# Patient Record
Sex: FEMALE | Race: WHITE | NOT HISPANIC OR LATINO | Employment: OTHER | URBAN - METROPOLITAN AREA
[De-identification: names, ages, dates, MRNs, and addresses within clinical notes are randomized per-mention and may not be internally consistent; named-entity substitution may affect disease eponyms.]

---

## 2017-02-14 ENCOUNTER — OFFICE VISIT (OUTPATIENT)
Dept: FAMILY MEDICINE | Facility: CLINIC | Age: 77
End: 2017-02-14
Payer: COMMERCIAL

## 2017-02-14 VITALS
SYSTOLIC BLOOD PRESSURE: 110 MMHG | HEIGHT: 65 IN | HEART RATE: 80 BPM | DIASTOLIC BLOOD PRESSURE: 68 MMHG | BODY MASS INDEX: 33.99 KG/M2 | WEIGHT: 204 LBS | TEMPERATURE: 97.6 F

## 2017-02-14 DIAGNOSIS — G47.00 INSOMNIA, UNSPECIFIED: ICD-10-CM

## 2017-02-14 DIAGNOSIS — I10 ESSENTIAL HYPERTENSION WITH GOAL BLOOD PRESSURE LESS THAN 140/90: ICD-10-CM

## 2017-02-14 DIAGNOSIS — E87.6 HYPOKALEMIA: ICD-10-CM

## 2017-02-14 DIAGNOSIS — K21.9 GASTROESOPHAGEAL REFLUX DISEASE, ESOPHAGITIS PRESENCE NOT SPECIFIED: ICD-10-CM

## 2017-02-14 DIAGNOSIS — H61.23 BILATERAL IMPACTED CERUMEN: ICD-10-CM

## 2017-02-14 DIAGNOSIS — E11.9 TYPE 2 DIABETES MELLITUS WITHOUT COMPLICATION, WITHOUT LONG-TERM CURRENT USE OF INSULIN (H): Primary | ICD-10-CM

## 2017-02-14 DIAGNOSIS — E78.5 HYPERLIPIDEMIA WITH TARGET LDL LESS THAN 100: ICD-10-CM

## 2017-02-14 PROBLEM — E03.9 HYPOTHYROIDISM, UNSPECIFIED TYPE: Status: ACTIVE | Noted: 2017-02-14

## 2017-02-14 LAB — HBA1C MFR BLD: 6.1 % (ref 4.3–6)

## 2017-02-14 PROCEDURE — 99213 OFFICE O/P EST LOW 20 MIN: CPT | Performed by: NURSE PRACTITIONER

## 2017-02-14 PROCEDURE — 83036 HEMOGLOBIN GLYCOSYLATED A1C: CPT | Performed by: NURSE PRACTITIONER

## 2017-02-14 PROCEDURE — 36415 COLL VENOUS BLD VENIPUNCTURE: CPT | Performed by: NURSE PRACTITIONER

## 2017-02-14 RX ORDER — PRAVASTATIN SODIUM 40 MG
40 TABLET ORAL DAILY
Qty: 90 TABLET | Refills: 1 | Status: SHIPPED | OUTPATIENT
Start: 2017-02-14 | End: 2017-08-23

## 2017-02-14 RX ORDER — PANTOPRAZOLE SODIUM 40 MG/1
40 TABLET, DELAYED RELEASE ORAL DAILY
Qty: 90 TABLET | Refills: 1 | Status: ON HOLD | OUTPATIENT
Start: 2017-02-14 | End: 2017-09-05

## 2017-02-14 RX ORDER — POTASSIUM CHLORIDE 750 MG/1
10 TABLET, EXTENDED RELEASE ORAL DAILY
Qty: 90 TABLET | Refills: 1 | Status: SHIPPED | OUTPATIENT
Start: 2017-02-14 | End: 2017-08-23

## 2017-02-14 NOTE — PROGRESS NOTES
SUBJECTIVE:                                                    Mya Hui is a 76 year old female who presents to clinic today for the following health issues:        Diabetes Follow-up      Patient is checking blood sugars: rarely.  Results range from 120-140     Diabetic concerns: None     Symptoms of hypoglycemia (low blood sugar): none     Paresthesias (numbness or burning in feet) or sores: No     Date of last diabetic eye exam: 12/2016     Hyperlipidemia Follow-Up      Rate your low fat/cholesterol diet?: good    Taking statin?  Yes, no muscle aches from statin    Other lipid medications/supplements?:  none     Hypertension Follow-up      Outpatient blood pressures are being checked at home.  Results are normal, patient does not check very often.    Low Salt Diet: no added salt. Patient does get pre packaged foods at times, unable to monitor salf intake sometimes         Amount of exercise or physical activity: walking    Problems taking medications regularly: No    Medication side effects: none  Diet: regular (no restrictions)    Patient is here to follow-up the above conditions. She is taking her medication as ordered, denies adverse side effects. Her blood pressure has been running within goal. Diabetes also is well controlled. She recently ran out of test strips and lancets, so is needing a refill.  She reports falling on [place], injuring the right side of her ribs. She is improved significantly, but still gets a little twinge once in a while when turning over in bed. She has no problems breathing, denies sustaining any other injury at the time  Her ears are feeling plugged, not painful. She does have some diminished hearing, would like to have them checked today    Problem list and histories reviewed & adjusted, as indicated.  Additional history: as documented    BP Readings from Last 3 Encounters:   02/14/17 110/68   11/10/16 110/70   09/07/16 126/62    Wt Readings from Last 3 Encounters:  "  02/14/17 204 lb (92.5 kg)   11/10/16 200 lb (90.7 kg)   09/07/16 198 lb (89.8 kg)                  Problem list, Medication list, Allergies, and Medical/Social/Surgical histories reviewed in [facility] and updated as appropriate.    ROS:  Constitutional, HEENT, cardiovascular, pulmonary, gi and gu systems are negative, except as otherwise noted.    OBJECTIVE:                                                    /68  Pulse 80  Temp 97.6  F (36.4  C) (Tympanic)  Ht 5' 5\" (1.651 m)  Wt 204 lb (92.5 kg)  BMI 33.95 kg/m2  Body mass index is 33.95 kg/(m^2).  GENERAL: healthy, alert and no distress  EYES: Eyes grossly normal to inspection, PERRL and conjunctivae and sclerae normal  HENT: Both ear canals are occluded by soft cerumen. Following irrigation, they are clear, TMs are pearly gray  NECK: no adenopathy, no asymmetry, masses, or scars and thyroid normal to palpation  RESP: lungs clear to auscultation - no rales, rhonchi or wheezes  CV: regular rate and rhythm, normal S1 S2, no S3 or S4, no murmur, click or rub, no peripheral edema and peripheral pulses strong  ABDOMEN: soft, nontender, no hepatosplenomegaly, no masses and bowel sounds normal  MS: no gross musculoskeletal defects noted, no edema  NEURO: Normal strength and tone, mentation intact and speech normal  PSYCH: mentation appears normal, affect normal/bright         ASSESSMENT/PLAN:                                                      Problem List Items Addressed This Visit        Medium    Insomnia, unspecified    Relevant Medications    amitriptyline (ELAVIL) 25 MG tablet    Hyperlipidemia with target LDL less than 100    Relevant Medications    pravastatin (PRAVACHOL) 40 MG tablet    GERD (gastroesophageal reflux disease)    Relevant Medications    pantoprazole (PROTONIX) 40 MG EC tablet    Essential hypertension with goal blood pressure less than 140/90      Other Visit Diagnoses     Type 2 diabetes mellitus without complication, without long-term " current use of insulin (H)    -  Primary    Relevant Orders    Hemoglobin A1c (Completed)    Hypokalemia        Relevant Medications    potassium chloride SA (K-DUR/KLOR-CON M) 10 MEQ CR tablet    Bilateral impacted cerumen             Hemoglobin A1c has been ordered, patient will be notified of result  She requested the above noted medications to be refilled, condition stable, not addressed today    There is no discomfort to palpation of the ribs. Suspect this is healing soft tissue injury from her fall. No further follow-up required for this issue      Follow-up in clinic in 6 weeks at which time she'll be due for another hemoglobin A1c and fasting blood work      MAYI Anthony CNP  [facility]

## 2017-02-14 NOTE — LETTER
[address] [id number] [phone]             February 15, 2017    Mya Hui  [address] [place] [address]              Dear Mya,     hemoglobin A1c is excellent at 6.1.  Enclosed is a copy of the results.  It was a pleasure to see you at your last appointment.    If you have any questions or concerns, please call myself or my nurse at [phone].      Sincerely,      Chanell Nelson CNP / care team

## 2017-02-14 NOTE — NURSING NOTE
"Chief Complaint   Patient presents with     Recheck Medication       Initial There were no vitals taken for this visit. Estimated body mass index is 32.53 kg/(m^2) as calculated from the following:    Height as of 8/16/16: 5' 5.75\" (1.67 m).    Weight as of 11/10/16: 200 lb (90.7 kg).  Medication Reconciliation: complete  "

## 2017-02-14 NOTE — MR AVS SNAPSHOT
"              After Visit Summary   2/14/2017    Mya Hui    MRN: [id number]           Patient Information     Date Of Birth          1940        Visit Information        Provider Department      2/14/2017 10:00 AM Chanell Nelson APRN CNP [facility]        Today's Diagnoses     Type 2 diabetes mellitus without complication, without long-term current use of insulin (H)    -  1    Insomnia, unspecified        Hyperlipidemia with target LDL less than 100        Hypokalemia        Gastroesophageal reflux disease, esophagitis presence not specified           Follow-ups after your visit        Who to contact     If you have questions or need follow up information about today's clinic visit or your schedule please contact [facility] directly at [phone].  Normal or non-critical lab and imaging results will be communicated to you by Dajiabaohart, letter or phone within 4 business days after the clinic has received the results. If you do not hear from us within 7 days, please contact the clinic through Dajiabaohart or phone. If you have a critical or abnormal lab result, we will notify you by phone as soon as possible.  Submit refill requests through [employer] or call your pharmacy and they will forward the refill request to us. Please allow 3 business days for your refill to be completed.          Additional Information About Your Visit        MyChart Information     [employer] lets you send messages to your doctor, view your test results, renew your prescriptions, schedule appointments and more. To sign up, go to [email or web address] . Click on \"Log in\" on the left side of the screen, which will take you to the Welcome page. Then click on \"Sign up Now\" on the right side of the page.     You will be asked to enter the access code listed below, as well as some personal information. Please follow the directions to create your username and password.     Your access code is: " [email or web address]  Expires: 5/15/2017 11:13 AM     Your access code will  in 90 days. If you need help or a new code, please call your [facility] clinic or [phone].        Care EveryWhere ID     This is your Care EveryWhere ID. This could be used by other organizations to access your [facility] medical records  [id number]        Your Vitals Were     Pulse Temperature Height BMI (Body Mass Index)          80 97.6  F (36.4  C) (Tympanic) 5' 5\" (1.651 m) 33.95 kg/m2         Blood Pressure from Last 3 Encounters:   17 110/68   11/10/16 110/70   16 126/62    Weight from Last 3 Encounters:   17 204 lb (92.5 kg)   11/10/16 200 lb (90.7 kg)   16 198 lb (89.8 kg)              We Performed the Following     Hemoglobin A1c          Where to get your medicines      These medications were sent to [id number] - [place], [place] - [address]  [address] [address]    Hours:  Formerly [facility] teresaers - numbers unchanged   03  Phone:  [phone]     amitriptyline 25 MG tablet    pantoprazole 40 MG EC tablet    potassium chloride SA 10 MEQ CR tablet    pravastatin 40 MG tablet          Primary Care Provider Office Phone # Fax #    Chanell MAYI Spence [facility] [phone]       [facility] [address] [place] [address]        Thank you!     Thank you for choosing [facility]  for your care. Our goal is always to provide you with excellent care. Hearing back from our patients is one way we can continue to improve our services. Please take a few minutes to complete the written survey that you may receive in the mail after your visit with us. Thank you!             Your Updated Medication List - Protect others around you: Learn how to safely use, store and throw away your medicines at www.disposemymeds.org.          This list is accurate as of: 17 11:13 AM.  Always use your most recent med list.                   Brand Name Dispense " Instructions for use    amitriptyline 25 MG tablet    ELAVIL    90 tablet    Take 1 tablet (25 mg) by mouth At Bedtime       aspirin 81 MG chewable tablet      Take 81 mg by mouth daily.       diltiazem 120 MG 24 hr capsule     90 capsule    Take 1 capsule (120 mg) by mouth daily       levothyroxine 50 MCG tablet    SYNTHROID/LEVOTHROID    90 tablet    Take 1 tablet (50 mcg) by mouth daily       lisinopril-hydrochlorothiazide 20-12.5 MG per tablet    PRINZIDE/ZESTORETIC    270 tablet    TAKE TWO TABLETS BY MOUTH EVERY MORNING & 1 TABLET AT SUPPER       metFORMIN 500 MG 24 hr tablet    GLUCOPHAGE-XR    270 tablet    TAKE ONE TABLET BY MOUTH EVERY MORNING AND TAKE TWO TABLETS BY MOUTH EVERY EVENING WITH MEALS       MULTIVITAL PO      Take  by mouth. daily       oxybutynin 5 MG tablet    DITROPAN    90 tablet    Take 1 tablet (5 mg) by mouth At Bedtime       pantoprazole 40 MG EC tablet    PROTONIX    90 tablet    Take 1 tablet (40 mg) by mouth daily       potassium chloride SA 10 MEQ CR tablet    K-DUR/KLOR-CON M    90 tablet    Take 1 tablet (10 mEq) by mouth daily       pravastatin 40 MG tablet    PRAVACHOL    90 tablet    Take 1 tablet (40 mg) by mouth daily

## 2017-02-15 ENCOUNTER — TELEPHONE (OUTPATIENT)
Dept: FAMILY MEDICINE | Facility: CLINIC | Age: 77
End: 2017-02-15

## 2017-02-15 DIAGNOSIS — E11.9 TYPE 2 DIABETES MELLITUS WITHOUT COMPLICATION, WITHOUT LONG-TERM CURRENT USE OF INSULIN (H): Primary | ICD-10-CM

## 2017-02-15 NOTE — TELEPHONE ENCOUNTER
Reason for Call:  Other prescription    Detailed comments: [facility] in [place] called and stated that they received a fax and was very confused. Please call them back.     Phone Number Patient can be reached at: Other phone number:  [phone]    Best Time: any    Can we leave a detailed message on this number? YES    Call taken on 2/15/2017 at 5:20 PM by Cleopatra Barbosa

## 2017-02-16 PROBLEM — E11.9 TYPE 2 DIABETES MELLITUS WITHOUT COMPLICATION, WITHOUT LONG-TERM CURRENT USE OF INSULIN (H): Status: ACTIVE | Noted: 2017-02-16

## 2017-02-16 NOTE — TELEPHONE ENCOUNTER
Spoke with patient, she states test strip she has is Soft Click Plus. Will review and send to provider for refill. ACase/MA

## 2017-02-16 NOTE — TELEPHONE ENCOUNTER
Per pharmacy, the test strips that are needed for patient are Accucheck. Unsure of specific strip name. LM on id vm for patient to call back. Please get full name if possible on the meter. Needing the information that is apparently on the meter. ACase/MA

## 2017-03-30 ENCOUNTER — OFFICE VISIT (OUTPATIENT)
Dept: FAMILY MEDICINE | Facility: CLINIC | Age: 77
End: 2017-03-30
Payer: COMMERCIAL

## 2017-03-30 VITALS
HEART RATE: 80 BPM | DIASTOLIC BLOOD PRESSURE: 80 MMHG | WEIGHT: 200 LBS | SYSTOLIC BLOOD PRESSURE: 132 MMHG | BODY MASS INDEX: 33.28 KG/M2 | TEMPERATURE: 97.2 F

## 2017-03-30 DIAGNOSIS — H26.9 CATARACT, BILATERAL: ICD-10-CM

## 2017-03-30 DIAGNOSIS — Z01.818 PREOP GENERAL PHYSICAL EXAM: Primary | ICD-10-CM

## 2017-03-30 PROCEDURE — 99214 OFFICE O/P EST MOD 30 MIN: CPT | Performed by: NURSE PRACTITIONER

## 2017-03-30 RX ORDER — LEVOTHYROXINE SODIUM 50 UG/1
50 TABLET ORAL DAILY
Qty: 90 TABLET | Refills: 2 | Status: CANCELLED | OUTPATIENT
Start: 2017-03-30

## 2017-03-30 NOTE — PATIENT INSTRUCTIONS
Before Your Surgery      Call your surgeon if there is any change in your health. This includes signs of a cold or flu (such as a sore throat, runny nose, cough, rash or fever).    Do not smoke, drink alcohol or take over the counter medicine (unless your surgeon or primary care doctor tells you to) for the 24 hours before and after surgery.    If you take prescribed drugs: Follow your doctor s orders about which medicines to take and which to stop until after surgery.    Eating and drinking prior to surgery: follow the instructions from your surgeon    Take a shower or bath the night before surgery. Use the soap your surgeon gave you to gently clean your skin. If you do not have soap from your surgeon, use your regular soap. Do not shave or scrub the surgery site.  Wear clean pajamas and have clean sheets on your bed.   Before Your Surgery    Call your surgeon if there is any change in your health. This includes signs of a cold or flu (such as a sore throat, runny nose, cough, rash or fever).  Do not smoke, drink alcohol or take over the counter medicine (unless your surgeon or primary care doctor tells you to) for the 24 hours before and after surgery.  If you take prescribed drugs: Follow your doctor s orders about which medicines to take and which to stop until after surgery.  Eating and drinking prior to surgery: follow the instructions from your surgeon  Take a shower or bath the night before surgery. Use the soap your surgeon gave you to gently clean your skin. If you do not have soap from your surgeon, use your regular soap. Do not shave or scrub the surgery site.  Wear clean pajamas and have clean sheets on your bed.

## 2017-03-30 NOTE — PROGRESS NOTES
[address] [address]  [phone]  Dept: [phone]    PRE-OP EVALUATION:  Today's date: 3/30/2017    Mya Hui (: 1940) presents for pre-operative evaluation assessment as requested by Dr. Cesar.  She requires evaluation and anesthesia risk assessment prior to undergoing surgery/procedure for treatment of eye cataracts .  Proposed procedure: Cataract extraction right eye      left eye     Date of Surgery/ Procedure: 17  Time of Surgery/ Procedure: 882, 830  Hospital/Surgical Facility: [facility]  Fax number for surgical facility: [phone]  Primary Physician: Chanell Nelson  Type of Anesthesia Anticipated: Local with MAC    Patient has a Health Care Directive or Living Will:  YES     1. NO - Do you have a history of heart attack, stroke, stent, bypass or surgery on an artery in the head, neck, heart or legs?  2. NO - Do you ever have any pain or discomfort in your chest?  3. NO - Do you have a history of  Heart Failure?  4. NO - Are you troubled by shortness of breath when: walking on the level, up a slight hill or at night?  5. NO - Do you currently have a cold, bronchitis or other respiratory infection?  6. NO - Do you have a cough, shortness of breath or wheezing?  7. NO - Do you sometimes get pains in the calves of your legs when you walk?  8. yes - Do you or anyone in your family have previous history of blood clots? daughter  9. yes - Do you or does anyone in your family have a serious bleeding problem such as prolonged bleeding following surgeries or cuts? daughter  10. NO - Have you ever had problems with anemia or been told to take iron pills?  11. NO - Have you had any abnormal blood loss such as black, tarry or bloody stools, or abnormal vaginal bleeding?  12. NO - Have you ever had a blood transfusion?  13. YES - Have you or any of your relatives ever had problems with anesthesia? Severe nausea  with narcotic meds; note allergy to fentanyl  14. NO - Do you have sleep apnea, excessive snoring or daytime drowsiness?  15. NO - Do you have any prosthetic heart valves?  16. NO - Do you have prosthetic joints?  17. NO - Is there any chance that you may be pregnant?      HPI:                                                      Brief HPI related to upcoming procedure: History of bilateral cataracts, recently worsening      See problem list for active medical problems.  Problems all longstanding and stable, except as noted/documented.  See ROS for pertinent symptoms related to these conditions.                                                                                                  .    MEDICAL HISTORY:                                                      Patient Active Problem List    Diagnosis Date Noted     Cataract, bilateral 03/30/2017     Priority: Medium     Type 2 diabetes mellitus without complication, without long-term current use of insulin (H) 02/16/2017     Priority: Medium     Hypothyroidism, unspecified type 02/14/2017     Priority: Medium     Insomnia, unspecified 02/14/2017     Priority: Medium     OAB (overactive bladder) 09/07/2016     Priority: Medium     Essential hypertension with goal blood pressure less than 140/90 09/07/2016     Priority: Medium     Osteoarthritis of hip 08/21/2014     Priority: Medium     Do you wish to do the replacement in the background? yes         DDD (degenerative disc disease), lumbar 08/21/2014     Priority: Medium     Hearing aid worn 05/30/2014     Priority: Medium     Right ear       Hip pain 09/27/2013     Priority: Medium     Sciatica of right side 06/28/2013     Priority: Medium     Asymptomatic carotid artery stenosis 05/02/2013     Priority: Medium     Hyperlipidemia with target LDL less than 100      Priority: Medium     Diagnosis updated by automated process. Provider to review and confirm.       GERD (gastroesophageal reflux disease)       Priority: Medium     History of renal calculi      Priority: Medium     Nevus 04/10/2013     Priority: Medium     Do you wish to do the replacement in the background? yes         Advance Care Planning 04/04/2013     Priority: Medium     Advance Care Planning:   Receipt of ACP document:  Received: Health Care Directive which was witnessed or notarized on 3/18/10.  Document not previously scanned.  Validation form completed and sent with document to be scanned.  Code Status reflects choices in most recent ACP document.  Confirmed/documented designated decision maker(s). See permanent comments section of demographics in clinical tab. View document(s) and details by clicking on code status.   Added by Jolie Wagner on 4/23/2014.                   Arthritis      Priority: Medium      Past Medical History:   Diagnosis Date     Arthritis      Diabetes type 2, controlled (H)      GERD (gastroesophageal reflux disease)      History of renal calculi      HTN, goal below 140/90      Hyperlipidaemia LDL goal < 100      Hypothyroid      Insomnia      Left carotid stenosis      Sciatica of right side 6/28/2013     Past Surgical History:   Procedure Laterality Date     BUNIONECTOMY      Right foot     COLONOSCOPY  7/12/2013    Procedure: COLONOSCOPY;  Colonoscopy;  Surgeon: Giovany Espinoza MD;  Location: [facility]     FRACTURE TX, ANKLE RT/LT      Left ankle     HC CYSTOURETHROSCOPY W/ URETEROSCOPY &/OR PYELOSCOPY; W/ LITHOTRIPSY       HC REVISE MEDIAN N/CARPAL TUNNEL SURG      Right wrist     INJECT JOINT SACROILIAC  4/10/2014    Procedure: INJECT JOINT SACROILIAC;  Sacroiliac Joint Injection;  Surgeon: Gilbert Mcclure MD;  Location: [facility] OR     Current Outpatient Prescriptions   Medication Sig Dispense Refill     blood glucose (NO BRAND SPECIFIED) lancets standard Glucose test strips  Use to test blood sugar 1 times daily or as directed. 1 Box 11     blood glucose monitoring (NO BRAND SPECIFIED) test strip Use to test blood  sugars 1 times daily or as directed 100 strip 3     amitriptyline (ELAVIL) 25 MG tablet Take 1 tablet (25 mg) by mouth At Bedtime 90 tablet 2     pravastatin (PRAVACHOL) 40 MG tablet Take 1 tablet (40 mg) by mouth daily 90 tablet 1     potassium chloride SA (K-DUR/KLOR-CON M) 10 MEQ CR tablet Take 1 tablet (10 mEq) by mouth daily 90 tablet 1     pantoprazole (PROTONIX) 40 MG EC tablet Take 1 tablet (40 mg) by mouth daily 90 tablet 1     oxybutynin (DITROPAN) 5 MG tablet Take 1 tablet (5 mg) by mouth At Bedtime 90 tablet 3     metFORMIN (GLUCOPHAGE-XR) 500 MG 24 hr tablet TAKE ONE TABLET BY MOUTH EVERY MORNING AND TAKE TWO TABLETS BY MOUTH EVERY EVENING WITH MEALS 270 tablet 2     lisinopril-hydrochlorothiazide (PRINZIDE,ZESTORETIC) 20-12.5 MG per tablet TAKE TWO TABLETS BY MOUTH EVERY MORNING & 1 TABLET AT SUPPER 270 tablet 2     diltiazem 120 MG 24 hr CD capsule Take 1 capsule (120 mg) by mouth daily 90 capsule 2     levothyroxine (SYNTHROID, LEVOTHROID) 50 MCG tablet Take 1 tablet (50 mcg) by mouth daily 90 tablet 2     Multiple Vitamins-Minerals (MULTIVITAL PO) Take  by mouth. daily       aspirin 81 MG chewable tablet Take 81 mg by mouth daily.       OTC products: None, except as noted above    Allergies   Allergen Reactions     Codeine Nausea     Fentanyl Nausea and Vomiting     VERY sensitive to most narcotics if not all.      Latex Allergy: NO    Social History   Substance Use Topics     Smoking status: Never Smoker     Smokeless tobacco: Never Used     Alcohol use Yes     History   Drug Use No       REVIEW OF SYSTEMS:                                                    C: NEGATIVE for fever, chills, change in weight  E/M: NEGATIVE for ear, mouth and throat problems  R: NEGATIVE for significant cough or SOB  CV: NEGATIVE for chest pain, palpitations or peripheral edema    EXAM:                                                    /80  Pulse 80  Temp 97.2  F (36.2  C) (Tympanic)  Wt 200 lb (90.7 kg)  BMI  33.28 kg/m2  GENERAL APPEARANCE: healthy, alert and no distress  HENT: ear canals and TM's normal and nose and mouth without ulcers or lesions  RESP: lungs clear to auscultation - no rales, rhonchi or wheezes  CV: regular rate and rhythm, normal S1 S2, no S3 or S4 and no murmur, click or rub   ABDOMEN: soft, nontender, no HSM or masses and bowel sounds normal  NEURO: Normal strength and tone, sensory exam grossly normal, mentation intact and speech normal    DIAGNOSTICS:                                                    No labs or EKG required for low risk surgery (cataract, skin procedure, breast biopsy, etc)    Recent Labs   Lab Test  02/14/17   1125  09/13/16   1002  08/16/16   1443  06/25/15   1016  05/22/15   1142   06/07/11   2220   HGB   --    --    --    --    --    --   13.1   PLT   --    --    --    --    --    --   212   NA   --    --   143   --   142   < >  145*   POTASSIUM   --   3.5  2.9*   --   3.0*   < >  3.5   CR   --    --   0.89  0.86  1.17*   < >  0.87   A1C  6.1*   --   6.2*   --   6.6*   < >   --     < > = values in this interval not displayed.        IMPRESSION:                                                    Reason for surgery/procedure: Cataract extraction right eye 4/5     left eye 4/19    Diagnosis/reason for consult: No medical contraindication noted to proceeding as planned     The proposed surgical procedure is considered LOW risk.    REVISED CARDIAC RISK INDEX  The patient has the following serious cardiovascular risks for perioperative complications such as (MI, PE, VFib and 3  AV Block):  No serious cardiac risks  INTERPRETATION: 0 risks: Class I (very low risk - 0.4% complication rate)    The patient has the following additional risks for perioperative complications:  No identified additional risks      ICD-10-CM    1. Preop general physical exam Z01.818    2. Cataract, bilateral H26.9        RECOMMENDATIONS:                                                        APPROVAL GIVEN  to proceed with proposed procedure, without further diagnostic evaluation       Signed Electronically by: MAYI Anthony CNP    Copy of this evaluation report is provided to requesting physician.    [facility] Preop Guidelines

## 2017-05-08 ENCOUNTER — TRANSFERRED RECORDS (OUTPATIENT)
Dept: HEALTH INFORMATION MANAGEMENT | Facility: CLINIC | Age: 77
End: 2017-05-08

## 2017-05-31 ENCOUNTER — OFFICE VISIT (OUTPATIENT)
Dept: FAMILY MEDICINE | Facility: CLINIC | Age: 77
End: 2017-05-31
Payer: COMMERCIAL

## 2017-05-31 VITALS
DIASTOLIC BLOOD PRESSURE: 56 MMHG | HEIGHT: 65 IN | BODY MASS INDEX: 32.15 KG/M2 | WEIGHT: 193 LBS | SYSTOLIC BLOOD PRESSURE: 114 MMHG | OXYGEN SATURATION: 100 % | TEMPERATURE: 97.3 F | HEART RATE: 94 BPM

## 2017-05-31 DIAGNOSIS — N32.81 OAB (OVERACTIVE BLADDER): ICD-10-CM

## 2017-05-31 DIAGNOSIS — R30.0 DYSURIA: Primary | ICD-10-CM

## 2017-05-31 DIAGNOSIS — N30.01 ACUTE CYSTITIS WITH HEMATURIA: ICD-10-CM

## 2017-05-31 LAB
ALBUMIN UR-MCNC: 100 MG/DL
AMORPH CRY #/AREA URNS HPF: ABNORMAL /HPF
APPEARANCE UR: ABNORMAL
BACTERIA #/AREA URNS HPF: ABNORMAL /HPF
BILIRUB UR QL STRIP: ABNORMAL
COLOR UR AUTO: YELLOW
GLUCOSE UR STRIP-MCNC: NEGATIVE MG/DL
HGB UR QL STRIP: ABNORMAL
HYALINE CASTS #/AREA URNS LPF: 48 /LPF (ref 0–2)
KETONES UR STRIP-MCNC: 5 MG/DL
LEUKOCYTE ESTERASE UR QL STRIP: ABNORMAL
NITRATE UR QL: NEGATIVE
PH UR STRIP: 5 PH (ref 5–7)
RBC #/AREA URNS AUTO: >182 /HPF (ref 0–2)
SP GR UR STRIP: 1.03 (ref 1–1.03)
SQUAMOUS #/AREA URNS AUTO: 6 /HPF (ref 0–1)
URN SPEC COLLECT METH UR: ABNORMAL
UROBILINOGEN UR STRIP-MCNC: 2 MG/DL (ref 0–2)
WBC #/AREA URNS AUTO: 2965 /HPF (ref 0–2)
WBC CLUMPS #/AREA URNS HPF: PRESENT /HPF

## 2017-05-31 PROCEDURE — 81001 URINALYSIS AUTO W/SCOPE: CPT | Performed by: FAMILY MEDICINE

## 2017-05-31 PROCEDURE — 99213 OFFICE O/P EST LOW 20 MIN: CPT | Performed by: FAMILY MEDICINE

## 2017-05-31 PROCEDURE — 87086 URINE CULTURE/COLONY COUNT: CPT | Performed by: FAMILY MEDICINE

## 2017-05-31 PROCEDURE — 87088 URINE BACTERIA CULTURE: CPT | Performed by: FAMILY MEDICINE

## 2017-05-31 PROCEDURE — 87186 SC STD MICRODIL/AGAR DIL: CPT | Performed by: FAMILY MEDICINE

## 2017-05-31 RX ORDER — NITROFURANTOIN 25; 75 MG/1; MG/1
100 CAPSULE ORAL 2 TIMES DAILY
Qty: 14 CAPSULE | Refills: 0 | Status: SHIPPED | OUTPATIENT
Start: 2017-05-31 | End: 2017-06-16

## 2017-05-31 ASSESSMENT — PAIN SCALES - GENERAL: PAINLEVEL: NO PAIN (0)

## 2017-05-31 NOTE — PATIENT INSTRUCTIONS
You have a bladder infection called UTI or acute cystitis.     I am starting you on a bladder antibiotic called Macrobid or nitrofurantoin. You will take 1 pill twice daily for 1 week.  This should hopefully help your bladder infection clear up.     You also need to drink more water, ideally 4-6 cups per day or more if able.   This may help your bladder AND your dry mouth.     Please try to cut back on your coffee to no more than 1 or 2 cups per day, or switch to decaf if possible.     You may take your bladder pill up to 4 times daily, or every 6 hours, as needed for bladder leaking or urgency.   This can make your dry mouth worse, so only take it as needed, for example if you are going out of the house or having more symptoms.     If you do not feel better after completing your antibiotics, please let me know.     Daina Cm MD

## 2017-05-31 NOTE — MR AVS SNAPSHOT
After Visit Summary   5/31/2017    Mya Hui    MRN: [id number]           Patient Information     Date Of Birth          1940        Visit Information        Provider Department      5/31/2017 10:15 AM Daina Cm MD [facility]        Today's Diagnoses     Dysuria    -  1    OAB (overactive bladder)        Acute cystitis with hematuria          Care Instructions    You have a bladder infection called UTI or acute cystitis.     I am starting you on a bladder antibiotic called Macrobid or nitrofurantoin. You will take 1 pill twice daily for 1 week.  This should hopefully help your bladder infection clear up.     You also need to drink more water, ideally 4-6 cups per day or more if able.   This may help your bladder AND your dry mouth.     Please try to cut back on your coffee to no more than 1 or 2 cups per day, or switch to decaf if possible.     You may take your bladder pill up to 4 times daily, or every 6 hours, as needed for bladder leaking or urgency.   This can make your dry mouth worse, so only take it as needed, for example if you are going out of the house or having more symptoms.     If you do not feel better after completing your antibiotics, please let me know.     Daina Cm MD           Follow-ups after your visit        Future tests that were ordered for you today     Open Future Orders        Priority Expected Expires Ordered    *UA reflex to Microscopic and Culture ([place]; [facility]; [facility]; [facility]; [facility]; [facility]; [place]; [place]) Routine  5/31/2018 5/31/2017            Who to contact     If you have questions or need follow up information about today's clinic visit or your schedule please contact [facility] directly at [phone].  Normal or non-critical lab and imaging results will be communicated to you by MyChart, letter or phone within 4 business days after the  "clinic has received the results. If you do not hear from us within 7 days, please contact the clinic through RecordS[employer] or phone. If you have a critical or abnormal lab result, we will notify you by phone as soon as possible.  Submit refill requests through RecordS[employer] or call your pharmacy and they will forward the refill request to us. Please allow 3 business days for your refill to be completed.          Additional Information About Your Visit        joblocalharWikisway Information     RecordSled lets you send messages to your doctor, view your test results, renew your prescriptions, schedule appointments and more. To sign up, go to [email or web address] . Click on \"Log in\" on the left side of the screen, which will take you to the Welcome page. Then click on \"Sign up Now\" on the right side of the page.     You will be asked to enter the access code listed below, as well as some personal information. Please follow the directions to create your username and password.     Your access code is: [email or web address]  Expires: 2017 11:18 AM     Your access code will  in 90 days. If you need help or a new code, please call your [facility] clinic or [phone].        Care EveryWhere ID     This is your Care EveryWhere ID. This could be used by other organizations to access your [facility] medical records  [id number]        Your Vitals Were     Pulse Temperature Height Pulse Oximetry BMI (Body Mass Index)       94 97.3  F (36.3  C) (Temporal) 5' 5\" (1.651 m) 100% 32.12 kg/m2        Blood Pressure from Last 3 Encounters:   17 114/56   17 132/80   17 110/68    Weight from Last 3 Encounters:   17 193 lb (87.5 kg)   17 200 lb (90.7 kg)   17 204 lb (92.5 kg)              We Performed the Following     *UA reflex to Microscopic and Culture ([place]; [facility]; [facility]; [facility]; [facility]; [facility]; [place]; [place])     Urine Culture Aerobic Bacterial          Today's " Medication Changes          These changes are accurate as of: 5/31/17 11:18 AM.  If you have any questions, ask your nurse or doctor.               Start taking these medicines.        Dose/Directions    nitrofurantoin (macrocrystal-monohydrate) 100 MG capsule   Commonly known as:  MACROBID   Used for:  Acute cystitis with hematuria   Started by:  Daina Cm MD        Dose:  100 mg   Take 1 capsule (100 mg) by mouth 2 times daily   Quantity:  14 capsule   Refills:  0            Where to get your medicines      These medications were sent to [facility] [place] - [address]   [address] [place] [address]     Phone:  [phone]     nitrofurantoin (macrocrystal-monohydrate) 100 MG capsule                Primary Care Provider Office Phone # Fax #    Chanell MAYI Spence [facility] [phone]       [facility] [address]   [place] [address]        Thank you!     Thank you for choosing [facility]  for your care. Our goal is always to provide you with excellent care. Hearing back from our patients is one way we can continue to improve our services. Please take a few minutes to complete the written survey that you may receive in the mail after your visit with us. Thank you!             Your Updated Medication List - Protect others around you: Learn how to safely use, store and throw away your medicines at www.disposemymeds.org.          This list is accurate as of: 5/31/17 11:18 AM.  Always use your most recent med list.                   Brand Name Dispense Instructions for use    amitriptyline 25 MG tablet    ELAVIL    90 tablet    Take 1 tablet (25 mg) by mouth At Bedtime       aspirin 81 MG chewable tablet      Take 81 mg by mouth daily.       blood glucose lancets standard    no brand specified    1 Box    Glucose test strips Use to test blood sugar 1 times daily or as directed.       blood glucose monitoring test  strip    no brand specified    100 strip    Use to test blood sugars 1 times daily or as directed       diltiazem 120 MG 24 hr capsule     90 capsule    Take 1 capsule (120 mg) by mouth daily       levothyroxine 50 MCG tablet    SYNTHROID/LEVOTHROID    90 tablet    Take 1 tablet (50 mcg) by mouth daily       lisinopril-hydrochlorothiazide 20-12.5 MG per tablet    PRINZIDE/ZESTORETIC    270 tablet    TAKE TWO TABLETS BY MOUTH EVERY MORNING & 1 TABLET AT SUPPER       metFORMIN 500 MG 24 hr tablet    GLUCOPHAGE-XR    270 tablet    TAKE ONE TABLET BY MOUTH EVERY MORNING AND TAKE TWO TABLETS BY MOUTH EVERY EVENING WITH MEALS       MULTIVITAL PO      Take  by mouth. daily       nitrofurantoin (macrocrystal-monohydrate) 100 MG capsule    MACROBID    14 capsule    Take 1 capsule (100 mg) by mouth 2 times daily       oxybutynin 5 MG tablet    DITROPAN    90 tablet    Take 1 tablet (5 mg) by mouth At Bedtime       pantoprazole 40 MG EC tablet    PROTONIX    90 tablet    Take 1 tablet (40 mg) by mouth daily       potassium chloride SA 10 MEQ CR tablet    K-DUR/KLOR-CON M    90 tablet    Take 1 tablet (10 mEq) by mouth daily       pravastatin 40 MG tablet    PRAVACHOL    90 tablet    Take 1 tablet (40 mg) by mouth daily

## 2017-05-31 NOTE — PROGRESS NOTES
"  SUBJECTIVE:                                                    S:  Mya Hui is a 77 year old female with a history of overactive bladder and renal calculi, who presents to the clinic complaining of urinary incontinence, urgency, and frequency. Patient says that her symptoms began about 2 days ago. She is experiencing intermittent urinary leakage and urgency - \"it either doesn't come when she needs to urinate, or comes when she feels she doesn't need to\". Patient says that this is extremely bothersome. She states that she is taking Oxybutynin 5 mg qd, she took it last night. She does not find this helpful. She has not been drinking much water, but notes that she drinks 2-3 cups of coffee daily. Patient also has a history of Type II Diabetes and checks her blood sugar once monthly - she feels that this has been within normal limits. Patient denies any fever, dysuria, hematuria, CVA tenderness, confusion,       Problem list and histories reviewed & adjusted, as indicated.  Additional history: as documented    Patient Active Problem List   Diagnosis     Advance Care Planning     Arthritis     Nevus     Asymptomatic carotid artery stenosis     Hyperlipidemia with target LDL less than 100     GERD (gastroesophageal reflux disease)     History of renal calculi     Sciatica of right side     Hip pain     Hearing aid worn     Osteoarthritis of hip     DDD (degenerative disc disease), lumbar     OAB (overactive bladder)     Essential hypertension with goal blood pressure less than 140/90     Hypothyroidism, unspecified type     Insomnia, unspecified     Type 2 diabetes mellitus without complication, without long-term current use of insulin (H)     Cataract, bilateral     Past Surgical History:   Procedure Laterality Date     BUNIONECTOMY      Right foot     COLONOSCOPY  7/12/2013    Procedure: COLONOSCOPY;  Colonoscopy;  Surgeon: Giovany Espinoza MD;  Location: [facility]     FRACTURE TX, ANKLE RT/LT      Left " ankle     HC CYSTOURETHROSCOPY W/ URETEROSCOPY &/OR PYELOSCOPY; W/ LITHOTRIPSY       HC REVISE MEDIAN N/CARPAL TUNNEL SURG      Right wrist     INJECT JOINT SACROILIAC  4/10/2014    Procedure: INJECT JOINT SACROILIAC;  Sacroiliac Joint Injection;  Surgeon: Gilbert Mcclure MD;  Location: [facility] OR       Social History   Substance Use Topics     Smoking status: Never Smoker     Smokeless tobacco: Never Used     Alcohol use Yes     Family History   Problem Relation Age of Onset     Hypertension Brother      DIABETES Father      Cardiovascular Father      DIABETES Brother      Borderline     Breast Cancer Mother      DIABETES Brother      Blood Disease Brother      Cardiovascular Brother      MI         Current Outpatient Prescriptions   Medication Sig Dispense Refill     nitrofurantoin, macrocrystal-monohydrate, (MACROBID) 100 MG capsule Take 1 capsule (100 mg) by mouth 2 times daily 14 capsule 0     blood glucose (NO BRAND SPECIFIED) lancets standard Glucose test strips  Use to test blood sugar 1 times daily or as directed. 1 Box 11     blood glucose monitoring (NO BRAND SPECIFIED) test strip Use to test blood sugars 1 times daily or as directed 100 strip 3     amitriptyline (ELAVIL) 25 MG tablet Take 1 tablet (25 mg) by mouth At Bedtime 90 tablet 2     pravastatin (PRAVACHOL) 40 MG tablet Take 1 tablet (40 mg) by mouth daily 90 tablet 1     potassium chloride SA (K-DUR/KLOR-CON M) 10 MEQ CR tablet Take 1 tablet (10 mEq) by mouth daily 90 tablet 1     pantoprazole (PROTONIX) 40 MG EC tablet Take 1 tablet (40 mg) by mouth daily 90 tablet 1     oxybutynin (DITROPAN) 5 MG tablet Take 1 tablet (5 mg) by mouth At Bedtime 90 tablet 3     metFORMIN (GLUCOPHAGE-XR) 500 MG 24 hr tablet TAKE ONE TABLET BY MOUTH EVERY MORNING AND TAKE TWO TABLETS BY MOUTH EVERY EVENING WITH MEALS 270 tablet 2     lisinopril-hydrochlorothiazide (PRINZIDE,ZESTORETIC) 20-12.5 MG per tablet TAKE TWO TABLETS BY MOUTH EVERY MORNING & 1 TABLET AT SUPPER  "270 tablet 2     diltiazem 120 MG 24 hr CD capsule Take 1 capsule (120 mg) by mouth daily 90 capsule 2     levothyroxine (SYNTHROID, LEVOTHROID) 50 MCG tablet Take 1 tablet (50 mcg) by mouth daily 90 tablet 2     Multiple Vitamins-Minerals (MULTIVITAL PO) Take  by mouth. daily       aspirin 81 MG chewable tablet Take 81 mg by mouth daily.       Allergies   Allergen Reactions     Codeine Nausea     Fentanyl Nausea and Vomiting     VERY sensitive to most narcotics if not all.     Recent Labs   Lab Test  02/14/17   1125  09/13/16   1002  08/16/16   1443  06/25/15   1016  05/22/15   1142  05/16/14   0907   A1C  6.1*   --   6.2*   --   6.6*   --    LDL   --    --   61   --   68  99   HDL   --    --   39*   --   47*  41*   TRIG   --    --   235*   --   165*  120   ALT   --    --   26   --   25  35   CR   --    --   0.89  0.86  1.17*  0.77   GFRESTIMATED   --    --   62  65  45*  74   GFRESTBLACK   --    --   75  79  55*  89   POTASSIUM   --   3.5  2.9*   --   3.0*  3.4   TSH   --    --   2.30   --   2.68  3.31      BP Readings from Last 3 Encounters:   05/31/17 114/56   03/30/17 132/80   02/14/17 110/68    Wt Readings from Last 3 Encounters:   05/31/17 193 lb (87.5 kg)   03/30/17 200 lb (90.7 kg)   02/14/17 204 lb (92.5 kg)        Reviewed and updated as needed this visit by clinical staff  Tobacco  Allergies  Meds  Med Hx  Surg Hx  Fam Hx  Soc Hx      Reviewed and updated as needed this visit by Provider         ROS:  Ears: Positive for ear congestion. She has a hearing aid and feels that her ears is congested - she would like this checked today.  All other ROS reviewed and are negative or non-contributory except as stated in HPI and above.     OBJECTIVE:                                                    /56  Pulse 94  Temp 97.3  F (36.3  C) (Temporal)  Ht 5' 5\" (1.651 m)  Wt 193 lb (87.5 kg)  SpO2 100%  BMI 32.12 kg/m2  Body mass index is 32.12 kg/(m^2).   Vitals noted.  Patient alert, oriented, and " in no acute distress.  Ears:  Canals clear, TM's nl bilaterally.  No erythema or fluid.  CV:  RRR without murmur.  Respiratory:  Lungs clear to auscultation bilaterally.    Diagnostic Test Results:  Orders Placed This Encounter   Procedures     *UA reflex to Microscopic and Culture ([place]; [facility]; [facility]; [facility]; [facility]; [facility]; [place]; [place])     *UA reflex to Microscopic and Culture ([place]; [facility]; [facility]; [facility]; [facility]; [facility]; [place]; [place])        ASSESSMENT:                                                        ICD-10-CM    1. Dysuria R30.0 *UA reflex to Microscopic and Culture ([place]; [facility]; [facility]; [facility]; [facility]; [facility]; [place]; [place])     *UA reflex to Microscopic and Culture ([place]; [facility]; [facility]; [facility]; [facility]; [facility]; [place]; [place])     Urine Culture Aerobic Bacterial     CANCELED: *UA reflex to Microscopic and Culture ([facility] [place] and [place])     CANCELED: *UA reflex to Microscopic and Culture ([facility]except [place] and [place])   2. Acute cystitis with hematuria N30.01 nitrofurantoin, macrocrystal-monohydrate, (MACROBID) 100 MG capsule   3. OAB (overactive bladder) N32.81        PLAN:                                                      Patient is afebrile with normal vitals upon presentation to the clinic. Her exam is unremarkable. Attempted to collect UA but patient could not provide much urine. Encouraged her to drink some water here in the clinic and attempt to collect more of a sample. UA collected and pending. UA was abnormal and significant for blood, LE, bacteria, and WBC, as well as several other abnormalities. Culture pending - will notify if needed. I reviewed these results with the patient - these are consistent with a UTI. Prescribed Macrobid,  see orders. I encouraged her to work on staying hydrated as well. Discussed with the patient that she may want to consider decreasing her coffee intake, or switching to decaf, as caffeine is a bladder irritant and may be exacerbating her symptoms. Discussed that she can also raise her Oxybutynin dose prn. She should continue with her once daily dose, and take another dose in the morning or daytime if her bladder is bothersome. Patient is in agreement with this treatment plan. Follow up in the clinic if symptoms persist, or sooner with difficulty staying hydrated, high fever, significant abdominal pain, hematuria, or other worsening symptoms.     This document serves as a record of services personally performed by Daina Cm MD. It was created on their behalf by Luna Mayorga, a trained medical scribe. The creation of this record is based on the provider's personal observations and the statements of the patient. This document has been checked and approved by the attending provider.    Daina Cm MD  [facility]

## 2017-05-31 NOTE — NURSING NOTE
"Chief Complaint   Patient presents with     UTI     urinary incontinance and urinary frequency and urgency/ sometimes she will feel the urge to go but nothing happens. Pt is taking oxybutynin        Initial /56  Pulse 94  Temp 97.3  F (36.3  C) (Temporal)  Ht 5' 5\" (1.651 m)  Wt 193 lb (87.5 kg)  SpO2 100%  BMI 32.12 kg/m2 Estimated body mass index is 32.12 kg/(m^2) as calculated from the following:    Height as of this encounter: 5' 5\" (1.651 m).    Weight as of this encounter: 193 lb (87.5 kg).  Medication Reconciliation: complete   Sharyn Sanchez CMA    "

## 2017-06-01 LAB
BACTERIA SPEC CULT: ABNORMAL
MICRO REPORT STATUS: ABNORMAL
MICROORGANISM SPEC CULT: ABNORMAL
SPECIMEN SOURCE: ABNORMAL

## 2017-06-02 NOTE — PROGRESS NOTES
Notify Mya that her urine culture shows that the antibiotics she is on should work fine.  If she is not improving, she needs to leave another sample for culture after finishing antibiotics.   Daina Cm MD

## 2017-06-16 ENCOUNTER — OFFICE VISIT (OUTPATIENT)
Dept: FAMILY MEDICINE | Facility: CLINIC | Age: 77
End: 2017-06-16
Payer: COMMERCIAL

## 2017-06-16 VITALS
HEART RATE: 80 BPM | TEMPERATURE: 97.2 F | BODY MASS INDEX: 33.45 KG/M2 | SYSTOLIC BLOOD PRESSURE: 138 MMHG | WEIGHT: 201 LBS | DIASTOLIC BLOOD PRESSURE: 80 MMHG

## 2017-06-16 DIAGNOSIS — E03.9 HYPOTHYROIDISM, UNSPECIFIED TYPE: ICD-10-CM

## 2017-06-16 DIAGNOSIS — R30.0 DYSURIA: ICD-10-CM

## 2017-06-16 DIAGNOSIS — I10 ESSENTIAL HYPERTENSION WITH GOAL BLOOD PRESSURE LESS THAN 140/90: ICD-10-CM

## 2017-06-16 DIAGNOSIS — R30.0 DYSURIA: Primary | ICD-10-CM

## 2017-06-16 DIAGNOSIS — M15.0 PRIMARY OSTEOARTHRITIS INVOLVING MULTIPLE JOINTS: ICD-10-CM

## 2017-06-16 DIAGNOSIS — E11.9 TYPE 2 DIABETES MELLITUS WITHOUT COMPLICATION, WITHOUT LONG-TERM CURRENT USE OF INSULIN (H): ICD-10-CM

## 2017-06-16 DIAGNOSIS — N32.81 OAB (OVERACTIVE BLADDER): Primary | ICD-10-CM

## 2017-06-16 LAB
ALBUMIN UR-MCNC: NEGATIVE MG/DL
APPEARANCE UR: ABNORMAL
BACTERIA #/AREA URNS HPF: ABNORMAL /HPF
BILIRUB UR QL STRIP: NEGATIVE
COLOR UR AUTO: YELLOW
GLUCOSE UR STRIP-MCNC: NEGATIVE MG/DL
HGB UR QL STRIP: NEGATIVE
HYALINE CASTS #/AREA URNS LPF: 1 /LPF (ref 0–2)
KETONES UR STRIP-MCNC: 5 MG/DL
LEUKOCYTE ESTERASE UR QL STRIP: ABNORMAL
MUCOUS THREADS #/AREA URNS LPF: PRESENT /LPF
NITRATE UR QL: NEGATIVE
PH UR STRIP: 5 PH (ref 5–7)
RBC #/AREA URNS AUTO: 1 /HPF (ref 0–2)
SP GR UR STRIP: 1.03 (ref 1–1.03)
SQUAMOUS #/AREA URNS AUTO: 9 /HPF (ref 0–1)
TRANS CELLS #/AREA URNS HPF: 1 /HPF
URN SPEC COLLECT METH UR: ABNORMAL
UROBILINOGEN UR STRIP-MCNC: 0 MG/DL (ref 0–2)
WBC #/AREA URNS AUTO: 5 /HPF (ref 0–2)

## 2017-06-16 PROCEDURE — 99213 OFFICE O/P EST LOW 20 MIN: CPT | Performed by: NURSE PRACTITIONER

## 2017-06-16 PROCEDURE — 81001 URINALYSIS AUTO W/SCOPE: CPT | Performed by: NURSE PRACTITIONER

## 2017-06-16 RX ORDER — ASPIRIN 81 MG/1
81 TABLET, CHEWABLE ORAL DAILY
Qty: 100 TABLET | Refills: 1 | Status: SHIPPED | OUTPATIENT
Start: 2017-06-16 | End: 2017-09-16

## 2017-06-16 RX ORDER — METFORMIN HCL 500 MG
TABLET, EXTENDED RELEASE 24 HR ORAL
Qty: 270 TABLET | Refills: 2 | Status: ON HOLD | OUTPATIENT
Start: 2017-06-16 | End: 2017-09-05

## 2017-06-16 RX ORDER — OXYBUTYNIN CHLORIDE 5 MG/1
10 TABLET ORAL 2 TIMES DAILY PRN
Qty: 240 TABLET | Refills: 0 | Status: ON HOLD | OUTPATIENT
Start: 2017-06-16 | End: 2017-09-05

## 2017-06-16 RX ORDER — DILTIAZEM HYDROCHLORIDE 120 MG/1
120 CAPSULE, COATED, EXTENDED RELEASE ORAL DAILY
Qty: 90 CAPSULE | Refills: 2 | Status: SHIPPED | OUTPATIENT
Start: 2017-06-16 | End: 2018-03-08

## 2017-06-16 RX ORDER — LEVOTHYROXINE SODIUM 50 UG/1
50 TABLET ORAL DAILY
Qty: 90 TABLET | Refills: 2 | Status: SHIPPED | OUTPATIENT
Start: 2017-06-16 | End: 2018-04-26

## 2017-06-16 RX ORDER — LISINOPRIL AND HYDROCHLOROTHIAZIDE 12.5; 2 MG/1; MG/1
TABLET ORAL
Qty: 270 TABLET | Refills: 2 | Status: ON HOLD | OUTPATIENT
Start: 2017-06-16 | End: 2017-09-05

## 2017-06-16 NOTE — PROGRESS NOTES
SUBJECTIVE:                                                    Mya Hui is a 77 year old female who presents to clinic today for the following health issues:      Medication Followup of Oxybutinin    Taking Medication as prescribed: yes    Side Effects:  None    Medication Helping Symptoms:  NO-not feeling like it's helping at all       Mya was recently seen in clinic with a rather significant urinary tract infection. She was treated with nitrofurantoin. She feels those symptoms have resolved. She does continue to have issues with overactive bladder, especially at nighttime. 5 mg at bedtime did not seem to help areas when she was seen for the UTI she was told she could take 2 tablets if need be, she did this last night and it did seem to work much better.    She is needing refills of her medications, is not due for lab work until August.    Her primary concern today is regarding her arthritic pain. She's had had multiple x-rays showing degenerative changes of the lumbar spine, pelvis, bilateral hips, and shoulders. Also degenerative changes noted in the ribs. In the past she had steroid injection [facility] [place]. She states this relieved her back pain for a year, but it has recurred. She does not note much relief with Aleve. She typically takes 2 Tylenol arthritis tablets, each 650 mg Tylenol, at bedtime.      Problem list and histories reviewed & adjusted, as indicated.  Additional history: as documented    BP Readings from Last 3 Encounters:   06/16/17 138/80   05/31/17 114/56   03/30/17 132/80    Wt Readings from Last 3 Encounters:   06/16/17 201 lb (91.2 kg)   05/31/17 193 lb (87.5 kg)   03/30/17 200 lb (90.7 kg)                    Reviewed and updated as needed this visit by clinical staff  Tobacco  Allergies  Med Hx  Surg Hx  Fam Hx  Soc Hx      Reviewed and updated as needed this visit by Provider         ROS:  Constitutional, HEENT,  cardiovascular, pulmonary, gi and gu systems are negative, except as otherwise noted.    OBJECTIVE:                                                    /80  Pulse 80  Temp 97.2  F (36.2  C) (Tympanic)  Wt 201 lb (91.2 kg)  BMI 33.45 kg/m2  Body mass index is 33.45 kg/(m^2).  GENERAL: Nutrition and hydration status appears adequate. No acute distress  NECK: no adenopathy, no asymmetry, masses, or scars and thyroid normal to palpation  RESP: lungs clear to auscultation - no rales, rhonchi or wheezes  CV: regular rate and rhythm, normal S1 S2, no S3 or S4, no murmur, click or rub, no peripheral edema and peripheral pulses strong  MS: Normal muscle bulk and tone. She is developing some Heberden's nodes on a few of her fingers, no joint deformities noted. No joint effusion, erythema, or increased warmth. Limited range of motion at the shoulders and waist. Walks with somewhat antalgic gait  NEURO: Normal strength and tone, mentation intact and speech normal    Diagnostic Test Results:  Results for orders placed or performed in visit on 06/16/17 (from the past 24 hour(s))   UA reflex to Microscopic   Result Value Ref [place]    Color Urine Yellow     Appearance Urine Slightly Cloudy     Glucose Urine Negative NEG mg/dL    Bilirubin Urine Negative NEG    Ketones Urine 5 (A) NEG mg/dL    Specific Gravity Urine 1.026 1.003 - 1.035    Blood Urine Negative NEG    pH Urine 5.0 5.0 - 7.0 pH    Protein Albumin Urine Negative NEG mg/dL    Urobilinogen mg/dL 0.0 0.0 - 2.0 mg/dL    Nitrite Urine Negative NEG    Leukocyte Esterase Urine Moderate (A) NEG    Source Unspecified Urine     RBC Urine 1 0 - 2 /HPF    WBC Urine 5 (H) 0 - 2 /HPF    Bacteria Urine Few (A) NEG /HPF    Squamous Epithelial /HPF Urine 9 (H) 0 - 1 /HPF    Transitional Epi 1 (A) FEW /HPF    Mucous Urine Present (A) NEG /LPF    Hyaline Casts 1 0 - 2 /LPF        ASSESSMENT/PLAN:                                                      Problem List Items Addressed  This Visit        Medium    Type 2 diabetes mellitus without complication, without long-term current use of insulin (H)    Relevant Medications    aspirin 81 MG chewable tablet    levothyroxine (SYNTHROID/LEVOTHROID) 50 MCG tablet    lisinopril-hydrochlorothiazide (PRINZIDE/ZESTORETIC) 20-12.5 MG per tablet    metFORMIN (GLUCOPHAGE-XR) 500 MG 24 hr tablet    Primary osteoarthritis involving multiple joints    Relevant Medications    aspirin 81 MG chewable tablet    diclofenac (VOLTAREN) 1 % GEL topical gel    OAB (overactive bladder) - Primary    Relevant Medications    oxybutynin (DITROPAN) 5 MG tablet    Hypothyroidism, unspecified type    Relevant Medications    levothyroxine (SYNTHROID/LEVOTHROID) 50 MCG tablet    Essential hypertension with goal blood pressure less than 140/90    Relevant Medications    lisinopril-hydrochlorothiazide (PRINZIDE/ZESTORETIC) 20-12.5 MG per tablet      Other Visit Diagnoses     Dysuria               Medications reordered enough to last until her next clinic visit with lab in August  May take up to 4 tablets of oxybutynin 5 mg daily for overactive bladder  May take up to 4 tablets of Tylenol arthritis, 650 mg, daily for arthritic pain as needed. Try not to use excessively.  Diclofenac gel to be applied topically as directed for relief of arthritic pain  She is going to schedule another appointment at [facility] for possible back injection   Urinary tract infection appears to have cleared      Return to clinic in August for fasting lab work and renewal of medications    [facility]YI [facility]

## 2017-06-16 NOTE — MR AVS SNAPSHOT
"              After Visit Summary   6/16/2017    Mya Hui    MRN: [id number]           Patient Information     Date Of Birth          1940        Visit Information        Provider Department      6/16/2017 10:00 AM Chanell Nelson APRN CNP [facility]        Today's Diagnoses     OAB (overactive bladder)    -  1    Dysuria        Hypothyroidism, unspecified type        Type 2 diabetes mellitus without complication, without long-term current use of insulin (H)        Primary osteoarthritis involving multiple joints        Essential hypertension with goal blood pressure less than 140/90           Follow-ups after your visit        Who to contact     If you have questions or need follow up information about today's clinic visit or your schedule please contact [facility] directly at [phone].  Normal or non-critical lab and imaging results will be communicated to you by [employer], letter or phone within 4 business days after the clinic has received the results. If you do not hear from us within 7 days, please contact the clinic through [employer] or phone. If you have a critical or abnormal lab result, we will notify you by phone as soon as possible.  Submit refill requests through [employer] or call your pharmacy and they will forward the refill request to us. Please allow 3 business days for your refill to be completed.          Additional Information About Your Visit        P[employer] Information     [employer] lets you send messages to your doctor, view your test results, renew your prescriptions, schedule appointments and more. To sign up, go to [email or web address] . Click on \"Log in\" on the left side of the screen, which will take you to the Welcome page. Then click on \"Sign up Now\" on the right side of the page.     You will be asked to enter the access code listed below, as well as some personal information. Please follow the directions to create your username and " password.     Your access code is: [email or web address]  Expires: 2017 11:18 AM     Your access code will  in 90 days. If you need help or a new code, please call your [facility] clinic or [phone].        Care EveryWhere ID     This is your Care EveryWhere ID. This could be used by other organizations to access your [facility] medical records  [id number]        Your Vitals Were     Pulse Temperature BMI (Body Mass Index)             80 97.2  F (36.2  C) (Tympanic) 33.45 kg/m2          Blood Pressure from Last 3 Encounters:   17 138/80   17 114/56   17 132/80    Weight from Last 3 Encounters:   17 201 lb (91.2 kg)   17 193 lb (87.5 kg)   17 200 lb (90.7 kg)              We Performed the Following     UA reflex to Microscopic          Today's Medication Changes          These changes are accurate as of: 17 11:52 AM.  If you have any questions, ask your nurse or doctor.               Start taking these medicines.        Dose/Directions    diclofenac 1 % Gel topical gel   Commonly known as:  VOLTAREN   Used for:  Primary osteoarthritis involving multiple joints   Started by:  Chanell Nelson APRN CNP        Apply 4 grams to knees or 2 grams to hands four times daily using enclosed dosing card.   Quantity:  100 g   Refills:  3         These medicines have changed or have updated prescriptions.        Dose/Directions    oxybutynin 5 MG tablet   Commonly known as:  DITROPAN   This may have changed:    - how much to take  - when to take this  - reasons to take this   Used for:  OAB (overactive bladder)   Changed by:  Chanell Nelson APRN CNP        Dose:  10 mg   Take 2 tablets (10 mg) by mouth 2 times daily as needed for bladder spasms   Quantity:  240 tablet   Refills:  0            Where to get your medicines      These medications were sent to [id number] - [place], [place] - [address]  [address] [address]    Hours:  [facility] [place] - numbers  unchanged   9/8/03  Phone:  [phone]     aspirin 81 MG chewable tablet    diclofenac 1 % Gel topical gel    diltiazem 120 MG 24 hr capsule    levothyroxine 50 MCG tablet    lisinopril-hydrochlorothiazide 20-12.5 MG per tablet    metFORMIN 500 MG 24 hr tablet    oxybutynin 5 MG tablet                Primary Care Provider Office Phone # Fax #    MAYI Anthony [facility] [phone]       [facility] [place]  [address] [place] [address]        Thank you!     Thank you for choosing [facility]  for your care. Our goal is always to provide you with excellent care. Hearing back from our patients is one way we can continue to improve our services. Please take a few minutes to complete the written survey that you may receive in the mail after your visit with us. Thank you!             Your Updated Medication List - Protect others around you: Learn how to safely use, store and throw away your medicines at www.disposemymeds.org.          This list is accurate as of: 6/16/17 11:52 AM.  Always use your most recent med list.                   Brand Name Dispense Instructions for use    amitriptyline 25 MG tablet    ELAVIL    90 tablet    Take 1 tablet (25 mg) by mouth At Bedtime       aspirin 81 MG chewable tablet     100 tablet    Take 1 tablet (81 mg) by mouth daily       blood glucose lancets standard    no brand specified    1 Box    Glucose test strips Use to test blood sugar 1 times daily or as directed.       blood glucose monitoring test strip    no brand specified    100 strip    Use to test blood sugars 1 times daily or as directed       diclofenac 1 % Gel topical gel    VOLTAREN    100 g    Apply 4 grams to knees or 2 grams to hands four times daily using enclosed dosing card.       diltiazem 120 MG 24 hr capsule     90 capsule    Take 1 capsule (120 mg) by mouth daily       levothyroxine 50 MCG tablet    SYNTHROID/LEVOTHROID    90 tablet    Take 1 tablet (50 mcg) by  mouth daily       lisinopril-hydrochlorothiazide 20-12.5 MG per tablet    PRINZIDE/ZESTORETIC    270 tablet    TAKE TWO TABLETS BY MOUTH EVERY MORNING & 1 TABLET AT SUPPER       metFORMIN 500 MG 24 hr tablet    GLUCOPHAGE-XR    270 tablet    TAKE ONE TABLET BY MOUTH EVERY MORNING AND TAKE TWO TABLETS BY MOUTH EVERY EVENING WITH MEALS       MULTIVITAL PO      Take  by mouth. daily       oxybutynin 5 MG tablet    DITROPAN    240 tablet    Take 2 tablets (10 mg) by mouth 2 times daily as needed for bladder spasms       pantoprazole 40 MG EC tablet    PROTONIX    90 tablet    Take 1 tablet (40 mg) by mouth daily       potassium chloride SA 10 MEQ CR tablet    K-DUR/KLOR-CON M    90 tablet    Take 1 tablet (10 mEq) by mouth daily       pravastatin 40 MG tablet    PRAVACHOL    90 tablet    Take 1 tablet (40 mg) by mouth daily

## 2017-06-16 NOTE — NURSING NOTE
"Chief Complaint   Patient presents with     RECHECK       Initial There were no vitals taken for this visit. Estimated body mass index is 32.12 kg/(m^2) as calculated from the following:    Height as of 5/31/17: 5' 5\" (1.651 m).    Weight as of 5/31/17: 193 lb (87.5 kg).  Medication Reconciliation: complete  "

## 2017-08-02 ENCOUNTER — TELEPHONE (OUTPATIENT)
Dept: FAMILY MEDICINE | Facility: CLINIC | Age: 77
End: 2017-08-02

## 2017-08-02 DIAGNOSIS — M25.50 MULTIPLE JOINT PAIN: ICD-10-CM

## 2017-08-02 DIAGNOSIS — M19.90 ARTHRITIS: Primary | ICD-10-CM

## 2017-08-02 NOTE — TELEPHONE ENCOUNTER
Jolie    can you please put in this referral for rheumatologist for arthritis, multiple joint pain

## 2017-08-02 NOTE — TELEPHONE ENCOUNTER
Reason for Call: Request for an order or referral:    Order or referral being requested: referral to [facility] in [place] for specialist in rheumatoid arthritis      Date needed: as soon as possible    Has the patient been seen by the PCP for this problem? YES    Additional comments: patient would like a referral sent to the specialist in [place]; patient provided fax number to send the referral to FAX # [phone]    Phone number Patient can be reached at:  Home number on file [phone] (home)    Best Time:  anytime    Can we leave a detailed message on this number?  YES    Call taken on 8/2/2017 at 9:54 AM by Raegan Pascal

## 2017-08-10 ENCOUNTER — OFFICE VISIT (OUTPATIENT)
Dept: FAMILY MEDICINE | Facility: CLINIC | Age: 77
End: 2017-08-10
Payer: COMMERCIAL

## 2017-08-10 VITALS
DIASTOLIC BLOOD PRESSURE: 80 MMHG | BODY MASS INDEX: 32.95 KG/M2 | SYSTOLIC BLOOD PRESSURE: 136 MMHG | WEIGHT: 198 LBS | TEMPERATURE: 98 F | HEART RATE: 80 BPM

## 2017-08-10 DIAGNOSIS — M25.511 RIGHT SHOULDER PAIN, UNSPECIFIED CHRONICITY: ICD-10-CM

## 2017-08-10 DIAGNOSIS — M15.0 PRIMARY OSTEOARTHRITIS INVOLVING MULTIPLE JOINTS: ICD-10-CM

## 2017-08-10 DIAGNOSIS — M25.50 PAIN IN JOINT, MULTIPLE SITES: Primary | ICD-10-CM

## 2017-08-10 DIAGNOSIS — E78.5 HYPERLIPIDEMIA WITH TARGET LDL LESS THAN 100: ICD-10-CM

## 2017-08-10 DIAGNOSIS — E11.9 TYPE 2 DIABETES MELLITUS WITHOUT COMPLICATION, WITHOUT LONG-TERM CURRENT USE OF INSULIN (H): ICD-10-CM

## 2017-08-10 DIAGNOSIS — I10 ESSENTIAL HYPERTENSION WITH GOAL BLOOD PRESSURE LESS THAN 140/90: ICD-10-CM

## 2017-08-10 DIAGNOSIS — E03.9 HYPOTHYROIDISM, UNSPECIFIED TYPE: ICD-10-CM

## 2017-08-10 DIAGNOSIS — R68.2 DRY MOUTH: ICD-10-CM

## 2017-08-10 DIAGNOSIS — H04.123 DRY EYES: ICD-10-CM

## 2017-08-10 PROBLEM — G89.29 CHRONIC RIGHT SHOULDER PAIN: Status: ACTIVE | Noted: 2017-08-10

## 2017-08-10 LAB
ALBUMIN SERPL-MCNC: 3.8 G/DL (ref 3.4–5)
ALBUMIN UR-MCNC: NEGATIVE MG/DL
ALP SERPL-CCNC: 52 U/L (ref 40–150)
ALT SERPL W P-5'-P-CCNC: 27 U/L (ref 0–50)
ANION GAP SERPL CALCULATED.3IONS-SCNC: 8 MMOL/L (ref 3–14)
APPEARANCE UR: CLEAR
AST SERPL W P-5'-P-CCNC: 20 U/L (ref 0–45)
BASOPHILS # BLD AUTO: 0 10E9/L (ref 0–0.2)
BASOPHILS NFR BLD AUTO: 0.6 %
BILIRUB SERPL-MCNC: 0.4 MG/DL (ref 0.2–1.3)
BILIRUB UR QL STRIP: NEGATIVE
BUN SERPL-MCNC: 20 MG/DL (ref 7–30)
CALCIUM SERPL-MCNC: 9.8 MG/DL (ref 8.5–10.1)
CHLORIDE SERPL-SCNC: 104 MMOL/L (ref 94–109)
CHOLEST SERPL-MCNC: 149 MG/DL
CO2 SERPL-SCNC: 31 MMOL/L (ref 20–32)
COLOR UR AUTO: YELLOW
CREAT SERPL-MCNC: 1.04 MG/DL (ref 0.52–1.04)
CREAT UR-MCNC: 460 MG/DL
CRP SERPL-MCNC: <2.9 MG/L (ref 0–8)
DIFFERENTIAL METHOD BLD: ABNORMAL
ENA SS-A IGG SER IA-ACNC: NORMAL AI (ref 0–0.9)
ENA SS-B IGG SER IA-ACNC: NORMAL AI (ref 0–0.9)
EOSINOPHIL # BLD AUTO: 0.2 10E9/L (ref 0–0.7)
EOSINOPHIL NFR BLD AUTO: 3.6 %
ERYTHROCYTE [DISTWIDTH] IN BLOOD BY AUTOMATED COUNT: 13.8 % (ref 10–15)
ERYTHROCYTE [SEDIMENTATION RATE] IN BLOOD BY WESTERGREN METHOD: 9 MM/H (ref 0–30)
GFR SERPL CREATININE-BSD FRML MDRD: 52 ML/MIN/1.7M2
GLUCOSE SERPL-MCNC: 99 MG/DL (ref 70–99)
GLUCOSE UR STRIP-MCNC: NEGATIVE MG/DL
HBA1C MFR BLD: 5.9 % (ref 4.3–6)
HCT VFR BLD AUTO: 37.6 % (ref 35–47)
HDLC SERPL-MCNC: 50 MG/DL
HGB BLD-MCNC: 11.8 G/DL (ref 11.7–15.7)
HGB UR QL STRIP: NEGATIVE
IMM GRANULOCYTES # BLD: 0 10E9/L (ref 0–0.4)
IMM GRANULOCYTES NFR BLD: 0 %
KETONES UR STRIP-MCNC: NEGATIVE MG/DL
LDLC SERPL CALC-MCNC: 64 MG/DL
LEUKOCYTE ESTERASE UR QL STRIP: NEGATIVE
LYMPHOCYTES # BLD AUTO: 2.2 10E9/L (ref 0.8–5.3)
LYMPHOCYTES NFR BLD AUTO: 32.6 %
MCH RBC QN AUTO: 27.4 PG (ref 26.5–33)
MCHC RBC AUTO-ENTMCNC: 31.4 G/DL (ref 31.5–36.5)
MCV RBC AUTO: 87 FL (ref 78–100)
MICROALBUMIN UR-MCNC: 86 MG/L
MICROALBUMIN/CREAT UR: 18.67 MG/G CR (ref 0–25)
MONOCYTES # BLD AUTO: 0.5 10E9/L (ref 0–1.3)
MONOCYTES NFR BLD AUTO: 7.3 %
NEUTROPHILS # BLD AUTO: 3.7 10E9/L (ref 1.6–8.3)
NEUTROPHILS NFR BLD AUTO: 55.9 %
NITRATE UR QL: NEGATIVE
NONHDLC SERPL-MCNC: 99 MG/DL
PH UR STRIP: 6 PH (ref 5–7)
PLATELET # BLD AUTO: 228 10E9/L (ref 150–450)
POTASSIUM SERPL-SCNC: 3.5 MMOL/L (ref 3.4–5.3)
PROT SERPL-MCNC: 7.3 G/DL (ref 6.8–8.8)
RBC # BLD AUTO: 4.31 10E12/L (ref 3.8–5.2)
RHEUMATOID FACT SER NEPH-ACNC: <20 IU/ML (ref 0–20)
SODIUM SERPL-SCNC: 143 MMOL/L (ref 133–144)
SP GR UR STRIP: 1.02 (ref 1–1.03)
TRIGL SERPL-MCNC: 173 MG/DL
TSH SERPL DL<=0.005 MIU/L-ACNC: 3.1 MU/L (ref 0.4–4)
URN SPEC COLLECT METH UR: NORMAL
UROBILINOGEN UR STRIP-ACNC: 0.2 EU/DL (ref 0.2–1)
WBC # BLD AUTO: 6.7 10E9/L (ref 4–11)

## 2017-08-10 PROCEDURE — 80061 LIPID PANEL: CPT | Performed by: NURSE PRACTITIONER

## 2017-08-10 PROCEDURE — 85652 RBC SED RATE AUTOMATED: CPT | Performed by: NURSE PRACTITIONER

## 2017-08-10 PROCEDURE — 86038 ANTINUCLEAR ANTIBODIES: CPT | Performed by: NURSE PRACTITIONER

## 2017-08-10 PROCEDURE — 86431 RHEUMATOID FACTOR QUANT: CPT | Performed by: NURSE PRACTITIONER

## 2017-08-10 PROCEDURE — 86235 NUCLEAR ANTIGEN ANTIBODY: CPT | Performed by: NURSE PRACTITIONER

## 2017-08-10 PROCEDURE — 82043 UR ALBUMIN QUANTITATIVE: CPT | Performed by: NURSE PRACTITIONER

## 2017-08-10 PROCEDURE — 86140 C-REACTIVE PROTEIN: CPT | Performed by: NURSE PRACTITIONER

## 2017-08-10 PROCEDURE — 81003 URINALYSIS AUTO W/O SCOPE: CPT | Performed by: NURSE PRACTITIONER

## 2017-08-10 PROCEDURE — 36415 COLL VENOUS BLD VENIPUNCTURE: CPT | Performed by: NURSE PRACTITIONER

## 2017-08-10 PROCEDURE — 83036 HEMOGLOBIN GLYCOSYLATED A1C: CPT | Performed by: NURSE PRACTITIONER

## 2017-08-10 PROCEDURE — 99214 OFFICE O/P EST MOD 30 MIN: CPT | Performed by: NURSE PRACTITIONER

## 2017-08-10 PROCEDURE — 80050 GENERAL HEALTH PANEL: CPT | Performed by: NURSE PRACTITIONER

## 2017-08-10 NOTE — MR AVS SNAPSHOT
After Visit Summary   8/10/2017    Mya Hui    MRN: [id number]           Patient Information     Date Of Birth          1940        Visit Information        Provider Department      8/10/2017 9:30 AM Chanell Nelson APRN [facility]        Today's Diagnoses     Pain in joint, multiple sites    -  1    Dry mouth        Dry eyes        Right shoulder pain, unspecified chronicity        Primary osteoarthritis involving multiple joints        Hyperlipidemia with target LDL less than 100        Hypothyroidism, unspecified type        Type 2 diabetes mellitus without complication, without long-term current use of insulin (H)           Follow-ups after your visit        Your next 10 appointments already scheduled     Aug 14, 2017  9:30 AM CDT   MR SHOULDER RIGHT W/O CONTRAST with PHMR1   [facility]    [address] [place] [id number] [phone]           Take your medicines as usual, unless your doctor tells you not to. Bring a list of your current medicines to your exam (including vitamins, minerals and over-the-counter drugs). Also bring the results of similar scans you may have had.  Please remove any body piercings and hair extensions before you arrive.  Follow your doctor s orders. If you do not, we may have to postpone your exam.  You will not have contrast for this exam. You do not need to do anything special to prepare.  The MRI machine uses a strong magnet. Please wear clothes without metal (snaps, zippers). A sweatsuit works well, or we may give you a hospital gown.   **IMPORTANT** THE INSTRUCTIONS BELOW ARE ONLY FOR THOSE PATIENTS WHO HAVE BEEN TOLD THEY WILL RECEIVE SEDATION OR GENERAL ANESTHESIA DURING THEIR MRI PROCEDURE:  IF YOU WILL RECEIVE SEDATION (take medicine to help you relax during your exam):   You must get the medicine from your doctor before you arrive. Bring the medicine to the exam. Do not  take it at home.   Arrive one hour early. Bring someone who can take you home after the test. Your medicine will make you sleepy. After the exam, you may not drive, take a bus or take a taxi by yourself.   No eating 8 hours before your exam. You may have clear liquids up until 4 hours before your exam. (Clear liquids include water, clear tea, black coffee and fruit juice without pulp.)  IF YOU WILL RECEIVE ANESTHESIA (be asleep for your exam):   Arrive 1 1/2 hours early. Bring someone who can take you home after the test. You may not drive, take a bus or take a taxi by yourself.   No eating 8 hours before your exam. You may have clear liquids up until 4 hours before your exam. (Clear liquids include water, clear tea, black coffee and fruit juice without pulp.)   You will spend four to five hours in the recovery room.  Please call the Imaging Department at your exam site with any questions.              Future tests that were ordered for you today     Open Future Orders        Priority Expected Expires Ordered    MR Shoulder Right w/o Contrast Routine  8/10/2018 8/10/2017            Who to contact     If you have questions or need follow up information about today's clinic visit or your schedule please contact [facility] directly at [phone].  Normal or non-critical lab and imaging results will be communicated to you by [employer], letter or phone within 4 business days after the clinic has received the results. If you do not hear from us within 7 days, please contact the clinic through [employer] or phone. If you have a critical or abnormal lab result, we will notify you by phone as soon as possible.  Submit refill requests through [employer] or call your pharmacy and they will forward the refill request to us. Please allow 3 business days for your refill to be completed.          Additional Information About Your Visit        [employer] Information     [employer] lets you send messages to your doctor, view  "your test results, renew your prescriptions, schedule appointments and more. To sign up, go to [email or web address] . Click on \"Log in\" on the left side of the screen, which will take you to the Welcome page. Then click on \"Sign up Now\" on the right side of the page.     You will be asked to enter the access code listed below, as well as some personal information. Please follow the directions to create your username and password.     Your access code is: [email or web address]  Expires: 2017 11:18 AM     Your access code will  in 90 days. If you need help or a new code, please call your [facility] clinic or [phone].        Care EveryWhere ID     This is your Care EveryWhere ID. This could be used by other organizations to access your [facility] medical records  [id number]        Your Vitals Were     Pulse Temperature BMI (Body Mass Index)             80 98  F (36.7  C) (Tympanic) 32.95 kg/m2          Blood Pressure from Last 3 Encounters:   08/10/17 136/80   17 138/80   17 114/56    Weight from Last 3 Encounters:   08/10/17 198 lb (89.8 kg)   17 201 lb (91.2 kg)   17 193 lb (87.5 kg)              We Performed the Following     Albumin Random Urine Quantitative     Antinuclear antibody screen by EIA     CBC with platelets differential     Comprehensive metabolic panel     CRP, inflammation     ESR: Erythrocyte sedimentation rate     Hemoglobin A1c     Lipid panel reflex to direct LDL     Rheumatoid factor     SSA Ro SHAZIA Antibody IgG     SSB La SHAZIA Antibody IgG     TSH with free T4 reflex     UA reflex to Microscopic        Primary Care Provider Office Phone # Fax #    MAYI Anthony [phone] [address] [place] [address]        Equal Access to Services     JANETTE RIBEIRO : Janelle Call, soo granger, qaybta kaalct godoy. So [facility] [phone].    ATENCIÓN: Si hugh fuentes " a pedro disposición servicios gratuitos de asistencia lingüística. Shravan lennon [phone].    We comply with applicable federal civil rights laws and [place] laws. We do not discriminate on the basis of race, color, national origin, age, disability sex, sexual orientation or gender identity.            Thank you!     Thank you for choosing [facility]  for your care. Our goal is always to provide you with excellent care. Hearing back from our patients is one way we can continue to improve our services. Please take a few minutes to complete the written survey that you may receive in the mail after your visit with us. Thank you!             Your Updated Medication List - Protect others around you: Learn how to safely use, store and throw away your medicines at www.disposemymeds.org.          This list is accurate as of: 8/10/17 10:06 AM.  Always use your most recent med list.                   Brand Name Dispense Instructions for use Diagnosis    amitriptyline 25 MG tablet    ELAVIL    90 tablet    Take 1 tablet (25 mg) by mouth At Bedtime    Insomnia, unspecified       aspirin 81 MG chewable tablet     100 tablet    Take 1 tablet (81 mg) by mouth daily    Type 2 diabetes mellitus without complication, without long-term current use of insulin (H)       blood glucose lancets standard    no brand specified    1 Box    Glucose test strips Use to test blood sugar 1 times daily or as directed.    Type 2 diabetes mellitus without complication, without long-term current use of insulin (H)       blood glucose monitoring test strip    no brand specified    100 strip    Use to test blood sugars 1 times daily or as directed    Type 2 diabetes mellitus without complication, without long-term current use of insulin (H)       diclofenac 1 % Gel topical gel    VOLTAREN    100 g    Apply 4 grams to knees or 2 grams to hands four times daily using enclosed dosing card.    Primary osteoarthritis involving multiple joints        diltiazem 120 MG 24 hr capsule     90 capsule    Take 1 capsule (120 mg) by mouth daily        levothyroxine 50 MCG tablet    SYNTHROID/LEVOTHROID    90 tablet    Take 1 tablet (50 mcg) by mouth daily    Hypothyroidism, unspecified type       lisinopril-hydrochlorothiazide 20-12.5 MG per tablet    PRINZIDE/ZESTORETIC    270 tablet    TAKE TWO TABLETS BY MOUTH EVERY MORNING & 1 TABLET AT SUPPER    Type 2 diabetes mellitus without complication, without long-term current use of insulin (H)       metFORMIN 500 MG 24 hr tablet    GLUCOPHAGE-XR    270 tablet    TAKE ONE TABLET BY MOUTH EVERY MORNING AND TAKE TWO TABLETS BY MOUTH EVERY EVENING WITH MEALS    Type 2 diabetes mellitus without complication, without long-term current use of insulin (H)       MULTIVITAL PO      Take  by mouth. daily        oxybutynin 5 MG tablet    DITROPAN    240 tablet    Take 2 tablets (10 mg) by mouth 2 times daily as needed for bladder spasms    OAB (overactive bladder)       pantoprazole 40 MG EC tablet    PROTONIX    90 tablet    Take 1 tablet (40 mg) by mouth daily    Gastroesophageal reflux disease, esophagitis presence not specified       potassium chloride SA 10 MEQ CR tablet    K-DUR/KLOR-CON M    90 tablet    Take 1 tablet (10 mEq) by mouth daily    Hypokalemia       pravastatin 40 MG tablet    PRAVACHOL    90 tablet    Take 1 tablet (40 mg) by mouth daily    Hyperlipidemia with target LDL less than 100

## 2017-08-10 NOTE — PROGRESS NOTES
SUBJECTIVE:                                                    Mya Hui is a 77 year old female who presents to clinic today for the following health issues:      Discuss getting 2nd opinion for RA referral    Mya is seen in clinic today due for follow-up of multiple chronic conditions including diabetes mellitus type 2, hypertension, hyperlipidemia, and hypothyroidism. She reports no adverse side effects to the use of those medications involved in her management. She watches her diet closely because of her diabetes. Blood sugars are typically running within goal, her pressure is also within goal. She is due for lab work.    She is here today primarily concerned about her multiple joint pains, chronic dry mouth and chronic dry eyes. She uses biotin mouthwash, and moisturizing eyedrops routinely. She and her daughter have been reviewing these multiple symptoms, her daughter is concerned about possible Sjogren's disease. She does have a diagnosis of osteoarthritis, has had x-rays of lumbar spine, hips, pelvis, and shoulders, all noting degenerative joint disease. She is taking oxybutynin for overactive bladder, but feels her symptoms of dry mouth and dry eyes were present sometime before starting this medication. She wanted an appointment scheduled with rheumatology, but needs to be seen by primary care and have basic lab work performed prior to getting an appointment.    She also reports worsening pain in the right shoulder. Degenerative changes have been noted on previous x-ray, she's had no further imaging. She reports difficulty abducting the arm, especially if she is trying to move it in a lateral plane. Increased pain against resistance, has difficulty lifting anything. Pain at times radiates from the shoulder into the hand. She denies any association of pain with movement of the neck.    Problem list and histories reviewed & adjusted, as indicated.  Additional history: as documented    BP Readings  from Last 3 Encounters:   08/10/17 136/80   06/16/17 138/80   05/31/17 114/56    Wt Readings from Last 3 Encounters:   08/10/17 198 lb (89.8 kg)   06/16/17 201 lb (91.2 kg)   05/31/17 193 lb (87.5 kg)                      Reviewed and updated as needed this visit by clinical staffTobacco  Allergies  Med Hx  Surg Hx  Fam Hx  Soc Hx      Reviewed and updated as needed this visit by Provider         ROS:  Constitutional, HEENT, cardiovascular, pulmonary, gi and gu systems are negative, except as otherwise noted.      OBJECTIVE:   /80  Pulse 80  Temp 98  F (36.7  C) (Tympanic)  Wt 198 lb (89.8 kg)  BMI 32.95 kg/m2  Body mass index is 32.95 kg/(m^2).   GENERAL: healthy, alert and no distress  EYES: Eyes grossly normal to inspection, PERRL and conjunctivae and sclerae normal  HENT: normal cephalic/atraumatic, ear canals and TM's normal, nose and mouth without ulcers or lesions, oropharynx clear and oral mucous membranes appear dry  NECK: no adenopathy, no asymmetry, masses, or scars and thyroid normal to palpation  RESP: lungs clear to auscultation - no rales, rhonchi or wheezes  CV: regular rate and rhythm, normal S1 S2, no S3 or S4, no murmur, click or rub, no peripheral edema and peripheral pulses strong  ABDOMEN: soft, nontender, no hepatosplenomegaly, no masses and bowel sounds normal  MS: no gross musculoskeletal defects noted, no edema. No pain to palpation of the right a.c. joint. Limited abduction to slightly less than 90  in all planes. Normal  strength, pulses, and reflexes.  SKIN: no suspicious lesions or rashes  NEURO: Normal strength and tone, mentation intact and speech normal  Diabetic foot exam: normal DP and PT pulses, no trophic changes or ulcerative lesions and normal sensory exam        ASSESSMENT/PLAN:     Problem List Items Addressed This Visit        Medium    Type 2 diabetes mellitus without complication, without long-term current use of insulin (H)    Relevant Orders    Albumin  Random Urine Quantitative (Completed)    Hemoglobin A1c (Completed)    Comprehensive metabolic panel (Completed)    Right shoulder pain, unspecified chronicity    Relevant Orders    ESR: Erythrocyte sedimentation rate (Completed)    CRP, inflammation (Completed)    Antinuclear antibody screen by EIA (Completed)    Rheumatoid factor (Completed)    SSB La SHAZIA Antibody IgG (Completed)    SSA Ro SHAZIA Antibody IgG (Completed)    CBC with platelets differential (Completed)    UA reflex to Microscopic (Completed)    MR Shoulder Right w/o Contrast    Primary osteoarthritis involving multiple joints    Hypothyroidism, unspecified type    Relevant Orders    TSH with free T4 reflex (Completed)    Hyperlipidemia with target LDL less than 100    Relevant Orders    Lipid panel reflex to direct LDL (Completed)    Essential hypertension with goal blood pressure less than 140/90    Dry mouth    Relevant Orders    ESR: Erythrocyte sedimentation rate (Completed)    CRP, inflammation (Completed)    Antinuclear antibody screen by EIA (Completed)    Rheumatoid factor (Completed)    SSB La SHAZIA Antibody IgG (Completed)    SSA Ro SHAZIA Antibody IgG (Completed)    CBC with platelets differential (Completed)    UA reflex to Microscopic (Completed)      Other Visit Diagnoses     Pain in joint, multiple sites    -  Primary    Relevant Orders    ESR: Erythrocyte sedimentation rate (Completed)    CRP, inflammation (Completed)    Antinuclear antibody screen by EIA (Completed)    Rheumatoid factor (Completed)    SSB La SHAZIA Antibody IgG (Completed)    SSA Ro SHAZIA Antibody IgG (Completed)    CBC with platelets differential (Completed)    UA reflex to Microscopic (Completed)    Dry eyes        Relevant Orders    ESR: Erythrocyte sedimentation rate (Completed)    CRP, inflammation (Completed)    Antinuclear antibody screen by EIA (Completed)    Rheumatoid factor (Completed)    SSB La SHAZIA Antibody IgG (Completed)    SSA Ro SHAZIA Antibody IgG (Completed)     CBC with platelets differential (Completed)    UA reflex to Microscopic (Completed)         Lab work ordered as above. Will refill medications when due, will make adjustment if needed pending lab results  When the above lab  results are available, will fax to [facility] rheumatology department along with copy of this clinic note  MRI of right shoulder has been ordered, further follow-up in that regard pending result.    MAYI Anthony CNP  [facility]

## 2017-08-11 ENCOUNTER — TELEPHONE (OUTPATIENT)
Dept: FAMILY MEDICINE | Facility: CLINIC | Age: 77
End: 2017-08-11

## 2017-08-11 DIAGNOSIS — M15.0 PRIMARY OSTEOARTHRITIS INVOLVING MULTIPLE JOINTS: Primary | ICD-10-CM

## 2017-08-11 LAB — ANA SER QL IA: NORMAL

## 2017-08-11 RX ORDER — CELECOXIB 100 MG/1
100 CAPSULE ORAL 2 TIMES DAILY PRN
Qty: 60 CAPSULE | Refills: 3 | Status: ON HOLD | OUTPATIENT
Start: 2017-08-11 | End: 2017-09-05

## 2017-08-11 NOTE — TELEPHONE ENCOUNTER
The patient was informed of her recent laboratory results. Everything is normal, tests are negative for evidence of autoimmune or inflammatory arthritis, negative for Sjogren's disease, which was a concern of hers. With x-rays confirming degenerative joint disease of multiple joints, and negative lab work, I don't think a rheumatology referral is appropriate at this time. We'll try Celebrex 100 mg twice daily for pain management. May continue to use Tylenol as needed between doses.

## 2017-08-14 ENCOUNTER — HOSPITAL ENCOUNTER (OUTPATIENT)
Dept: MRI IMAGING | Facility: CLINIC | Age: 77
Discharge: HOME | End: 2017-08-14
Attending: NURSE PRACTITIONER | Admitting: NURSE PRACTITIONER
Payer: MEDICARE

## 2017-08-14 DIAGNOSIS — M25.511 RIGHT SHOULDER PAIN, UNSPECIFIED CHRONICITY: ICD-10-CM

## 2017-08-14 PROCEDURE — 73221 MRI JOINT UPR EXTREM W/O DYE: CPT | Mod: RT

## 2017-08-17 ENCOUNTER — TELEPHONE (OUTPATIENT)
Dept: FAMILY MEDICINE | Facility: CLINIC | Age: 77
End: 2017-08-17

## 2017-08-17 DIAGNOSIS — M25.511 RIGHT SHOULDER PAIN: Primary | ICD-10-CM

## 2017-08-17 DIAGNOSIS — M25.511 RIGHT SHOULDER PAIN, UNSPECIFIED CHRONICITY: Primary | ICD-10-CM

## 2017-08-17 NOTE — TELEPHONE ENCOUNTER
Notes Recorded by Diamond Michele MA on 8/17/2017 at 4:03 PM  Patient was informed per Chanell's request regarding  MRI. Patient shows arthritis in shoulder, and right rotator cuff tear. Provider would like patient to f/u with ortho, Dr Bruno. Will forward to  for scheduling appointment. Patient available most any time for this appointment.  ACase/MA

## 2017-08-23 DIAGNOSIS — E87.6 HYPOKALEMIA: ICD-10-CM

## 2017-08-23 DIAGNOSIS — E78.5 HYPERLIPIDEMIA WITH TARGET LDL LESS THAN 100: ICD-10-CM

## 2017-08-23 NOTE — TELEPHONE ENCOUNTER
Potassium      Last Written Prescription Date: 2/14/2017  Last Fill Quantity: 90, # refills: 1  Last Office Visit with [facility] [place] [place], [facility] or [facility] prescribing provider: 8/10/2017       Potassium   Date Value Ref Range Status   08/10/2017 3.5 3.4 - 5.3 mmol/L Final     Creatinine   Date Value Ref Range Status   08/10/2017 1.04 0.52 - 1.04 mg/dL Final     BP Readings from Last 3 Encounters:   08/10/17 136/80   06/16/17 138/80   05/31/17 114/56     Pravastatin     Last Written Prescription Date: 2/14/2017  Last Fill Quantity: 90, # refills: 1  Last Office Visit with [facility] [place] [place], [facility] or [facility] prescribing provider: 8/10/2017       Lab Results   Component Value Date    CHOL 149 08/10/2017     Lab Results   Component Value Date    HDL 50 08/10/2017     Lab Results   Component Value Date    LDL 64 08/10/2017     Lab Results   Component Value Date    TRIG 173 08/10/2017     Lab Results   Component Value Date    CHOLHDLRATIO 3.1 05/22/2015

## 2017-08-28 ENCOUNTER — OFFICE VISIT (OUTPATIENT)
Dept: ORTHOPEDICS | Facility: CLINIC | Age: 77
End: 2017-08-28
Payer: COMMERCIAL

## 2017-08-28 ENCOUNTER — RADIANT APPOINTMENT (OUTPATIENT)
Dept: GENERAL RADIOLOGY | Facility: CLINIC | Age: 77
End: 2017-08-28
Attending: ORTHOPAEDIC SURGERY
Payer: COMMERCIAL

## 2017-08-28 VITALS — HEIGHT: 65 IN | TEMPERATURE: 97.7 F | BODY MASS INDEX: 33.49 KG/M2 | WEIGHT: 201 LBS

## 2017-08-28 DIAGNOSIS — M25.519 SHOULDER PAIN: ICD-10-CM

## 2017-08-28 DIAGNOSIS — M12.811 ROTATOR CUFF ARTHROPATHY, RIGHT: Primary | ICD-10-CM

## 2017-08-28 PROCEDURE — 20610 DRAIN/INJ JOINT/BURSA W/O US: CPT | Mod: RT | Performed by: ORTHOPAEDIC SURGERY

## 2017-08-28 PROCEDURE — 99203 OFFICE O/P NEW LOW 30 MIN: CPT | Mod: 25 | Performed by: ORTHOPAEDIC SURGERY

## 2017-08-28 PROCEDURE — 73030 X-RAY EXAM OF SHOULDER: CPT | Mod: TC

## 2017-08-28 RX ORDER — PRAVASTATIN SODIUM 40 MG
TABLET ORAL
Qty: 90 TABLET | Refills: 3 | Status: SHIPPED | OUTPATIENT
Start: 2017-08-28 | End: 2018-08-30

## 2017-08-28 RX ORDER — POTASSIUM CHLORIDE 750 MG/1
TABLET, EXTENDED RELEASE ORAL
Qty: 90 TABLET | Refills: 3 | Status: ON HOLD | OUTPATIENT
Start: 2017-08-28 | End: 2017-09-11

## 2017-08-28 ASSESSMENT — PAIN SCALES - GENERAL: PAINLEVEL: MILD PAIN (3)

## 2017-08-28 NOTE — PROGRESS NOTES
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      She is being seen for   Chief Complaints and History of Present Illnesses   Patient presents with     Consult     rt shoulder pain per MAYI Anthony CNP         History of Present Illness:   Mya Hui is a 77 year old female who is seen in consultation at the request of MAYI Anthony CNP.  History of Present illness:  Mya presents for evaluation of:  1.) rt shoulder  Onset:  December and 2016    Symptoms brought on by fell on ice onto right side, had a rib injury  Location:  rt shoulder .    Character:  sharp, dull ache, stabbing and burning.    Progression of symptoms:  worse.    Previous similar pain: YES.   Pain Level:  3/10.   Previous treatments:  heat, ice and deep heating rub. celebrex does help.  Cream does not.  Currently on Blood thinners? no  Diagnosis of Diabetes? Yes   Had some problems before the fall but thought it was arthritis.  Has terrible pain with use, sewing, reaching  Sleeping okay  Better with rest      Patient's past medical, surgical, social and family histories reviewed.     Past Medical History:   Diagnosis Date     Arthritis      Diabetes type 2, controlled (H)      GERD (gastroesophageal reflux disease)      History of renal calculi      HTN, goal below 140/90      Hyperlipidaemia LDL goal < 100      Hypothyroid      Insomnia      Left carotid stenosis      Sciatica of right side 6/28/2013       Past Surgical History:   Procedure Laterality Date     BUNIONECTOMY      Right foot     COLONOSCOPY  7/12/2013    Procedure: COLONOSCOPY;  Colonoscopy;  Surgeon: Giovany Espinoza MD;  Location: [facility]     FRACTURE TX, ANKLE RT/LT      Left ankle     HC CYSTOURETHROSCOPY W/ URETEROSCOPY &/OR PYELOSCOPY; W/ LITHOTRIPSY       HC REVISE MEDIAN N/CARPAL TUNNEL SURG      Right wrist     INJECT JOINT SACROILIAC   4/10/2014    Procedure: INJECT JOINT SACROILIAC;  Sacroiliac Joint Injection;  Surgeon: Gilbert Mcclure MD;  Location:  OR       Medications:    Current Outpatient Prescriptions on File Prior to Visit:  celecoxib (CELEBREX) 100 MG capsule Take 1 capsule (100 mg) by mouth 2 times daily as needed for moderate pain   aspirin 81 MG chewable tablet Take 1 tablet (81 mg) by mouth daily   levothyroxine (SYNTHROID/LEVOTHROID) 50 MCG tablet Take 1 tablet (50 mcg) by mouth daily   diltiazem 120 MG 24 hr capsule Take 1 capsule (120 mg) by mouth daily   lisinopril-hydrochlorothiazide (PRINZIDE/ZESTORETIC) 20-12.5 MG per tablet TAKE TWO TABLETS BY MOUTH EVERY MORNING & 1 TABLET AT SUPPER   metFORMIN (GLUCOPHAGE-XR) 500 MG 24 hr tablet TAKE ONE TABLET BY MOUTH EVERY MORNING AND TAKE TWO TABLETS BY MOUTH EVERY EVENING WITH MEALS   oxybutynin (DITROPAN) 5 MG tablet Take 2 tablets (10 mg) by mouth 2 times daily as needed for bladder spasms   diclofenac (VOLTAREN) 1 % GEL topical gel Apply 4 grams to knees or 2 grams to hands four times daily using enclosed dosing card.   blood glucose (NO BRAND SPECIFIED) lancets standard Glucose test stripsUse to test blood sugar 1 times daily or as directed.   blood glucose monitoring (NO BRAND SPECIFIED) test strip Use to test blood sugars 1 times daily or as directed   amitriptyline (ELAVIL) 25 MG tablet Take 1 tablet (25 mg) by mouth At Bedtime   pravastatin (PRAVACHOL) 40 MG tablet Take 1 tablet (40 mg) by mouth daily   potassium chloride SA (K-DUR/KLOR-CON M) 10 MEQ CR tablet Take 1 tablet (10 mEq) by mouth daily   pantoprazole (PROTONIX) 40 MG EC tablet Take 1 tablet (40 mg) by mouth daily   Multiple Vitamins-Minerals (MULTIVITAL PO) Take  by mouth. daily     No current facility-administered medications on file prior to visit.     Allergies   Allergen Reactions     Codeine Nausea     Fentanyl Nausea and Vomiting     VERY sensitive to most narcotics if not all.       Social History  "    Occupational History      Retired           's office part time     Social History Main Topics     Smoking status: Never Smoker     Smokeless tobacco: Never Used     Alcohol use Yes     Drug use: No     Sexual activity: Not Currently       Family History   Problem Relation Age of Onset     Hypertension Brother      DIABETES Father      Cardiovascular Father      DIABETES Brother      Borderline     Breast Cancer Mother      DIABETES Brother      Blood Disease Brother      Cardiovascular Brother      MI       REVIEW OF SYSTEMS  10 point review systems performed otherwise negative as noted as per history of present illness.    Physical Exam:  Vitals: Temp 97.7  F (36.5  C)  Ht 1.651 m (5' 5\")  Wt 91.2 kg (201 lb)  BMI 33.45 kg/m2  BMI= Body mass index is 33.45 kg/(m^2).    Constitutional: healthy, alert and no acute distress   Psychiatric: mentation appears normal and affect normal/bright  NEURO: no focal deficits  RESP: Normal with easy respirations and no use of accessory muscles to breathe, no audible wheezing or retractions  CV: regular pulse  SKIN: No erythema, rashes, excoriation, or breakdown. No evidence of infection.   JOINT/EXTREMITIES:right shoulder - FROM, positive impingement, well-compensated strength in rotator cuff, pain with abduction  GAIT: not tested   Lymph: no palpable lymph nodes    Diagnostic Modalities:  right shoulder X-ray: with minimal glenohumeral joint space narrowing  right shoulder MRI:  Rotator cuff full thickness tears: Supraspinatus, Infraspinatus and large tear to my read with markedly thinned muscle.  Independent visualization of the images was performed.      Impression: right Rotator cuff arthropathy - recommend nonop treatment    Plan:  All of the above pertinent physical exam and imaging modalities findings was reviewed with Mya and her daughter.                                          CONSERVATIVE CARE:  I recommend conservative care for the patient to include " NSAIDs, Tylenol, focused self directed physical therapy, formal physical therapy, activity modifications. Today I provided or dispensed physical therapy, info and hep. Can alternate celebrex and tylenol.                                        INJECTION PROCEDURE:  The patient was counseled about an  injection, including discussion of risks (including infection), contents of the injection, rationale for performing the injection, and expected benefits of the injection. The skin was prepped with alcohol and betadine and then utilizing sterile technique an injection of the right shoulder subacromial space from the anterolateral approach  was performed. The injection consisted 1ml of Kenalog (40mg per 1ml) with 8ml 1% lidocaine plain. The patient tolerated the injection well, and there were no complications. The injection site was covered with a Band-Aid. The injection was performed by Annamaria Carpio M.D.                                                FUTURE PLAN:  On their return if they still have symptoms we will consider injection of steroids.      Return to clinic 6, week(s), or sooner as needed for changes.  Re-x-ray on return: No    Annamaria Carpio M.D.

## 2017-08-28 NOTE — NURSING NOTE
"Chief Complaint   Patient presents with     Consult     rt shoulder pain per MAYI Anthony CNP       Initial Temp 97.7  F (36.5  C)  Ht 1.651 m (5' 5\")  Wt 91.2 kg (201 lb)  BMI 33.45 kg/m2 Estimated body mass index is 33.45 kg/(m^2) as calculated from the following:    Height as of this encounter: 1.651 m (5' 5\").    Weight as of this encounter: 91.2 kg (201 lb).  Medication Reconciliation: complete    BP completed using cuff size: NA (Not Taken)    Sheree George MA      "

## 2017-08-28 NOTE — MR AVS SNAPSHOT
After Visit Summary   8/28/2017    Mya Hui    MRN: [id number]           Patient Information     Date Of Birth          1940        Visit Information        Provider Department      8/28/2017 1:10 PM Annamaria Carpio MD [facility]        Today's Diagnoses     Shoulder pain    -  1      Care Instructions      Understanding Glenohumeral Osteoarthritis    A joint is a place where two bones meet. The glenohumeral joint is the main joint in the shoulder. It is a ball-and-socket joint. It is formed where the head or ball of the upper arm bone fits into the shallow socket on the shoulder blade. The upper arm bone is called the humerus. The socket is called the glenoid. This is how the joint gets its name. When the glenohumeral joint is healthy, it helps you rotate and move your arm freely without any pain.  With glenohumeral osteoarthritis, the parts of the joint wear down. This can cause pain, stiffness, and limited movement. Glenohumeral osteoarthritis is a long-term condition. But treatment can help manage symptoms, increase movement, and improve function.  How glenohumeral osteoarthritis occurs  All joints contain a smooth tissue called cartilage. Cartilage cushions the ends of bones. This helps them glide smoothly against each other. Glenohumeral osteoarthritis occurs when cartilage in the glenohumeral joint begins to break down. The ball and socket bones may then become exposed and rub together. The bones may become rough and pitted and may begin to wear away. This prevents smooth movement of the joint.  Causes of glenohumeral osteoarthritis  In most cases, the condition develops because of damage from a shoulder injury, such as a dislocated or broken shoulder. Normal wear and tear of the joint from aging can also cause osteoarthritis.  Symptoms of glenohumeral osteoarthritis  Symptoms tend to develop slowly over months to years. Shoulder pain is common. The pain is  often worse with activity, and may get better with rest. Over time, the pain may worsen, and may even occur at night.  Stiffness in the shoulder is also common. It may be hard to move or use the arm and shoulder as you normally would. This can make even simple tasks difficult, such as reaching for an item on a shelf or getting dressed.  Treating glenohumeral osteoarthritis  Treatment may include:    Resting the shoulder. This involves limiting certain movements, such as reaching, lifting, pushing, or pulling. These can cause further wear and tear of the joint and make symptoms worse.    Cold or heat packs. Cold packs can help reduce pain and swelling. Heat packs do not help with swelling. But they may help relieve pain and stiffness, especially before physical therapy or activity.    Pain medicines. These help relieve pain and swelling. Medicines may be prescribed or bought over the counter.    Injections of medicine into the joint. These help relieve symptoms for a time.    Physical therapy and exercises.  These help improve strength and range of motion in the joint. This may reduce shoulder stiffness and improve function.    Certain assistive devices. These are tools that can be used to make activities of daily life easier. For instance, a  grasper  can help you reach and grab items.    Alternative treatments. These include options, such as acupuncture or massage. They may help relieve pain and stiffness in some cases.  If other treatments don t do enough to relieve symptoms, you may need surgery. During surgery, the damaged joint is removed. It is then replaced with an artificial joint. This can help relieve symptoms and improve function to near normal.     When to call your healthcare provider  Call your healthcare provider right away if you have any of these:    Fever of 100.4 F (38 C) or higher, or as directed    Symptoms that don t get better with treatment, or get worse    New symptoms   Date Last Reviewed:  3/10/2016    7114-1905 [employer]. [address] [place] [address]. All rights reserved. This information is not intended as a substitute for professional medical care. Always follow your healthcare professional's instructions.        Shoulder External Rotation, Side-Lying (Strength)    This exercise is for your right shoulder joint. Switch sides if the problem is in your left shoulder joint.  1. Lie on your left side with your head supported by a pillow. Place a small rolled-up towel under your right elbow.  2. Hold a hand weight in your right hand. Your healthcare provider will tell you what size hand weight to use.  3. Bend your arm in front of you at a right angle. Then bend it down and bring the hand weight to the floor. Rest your forearm on your stomach.  4. Keep your elbow on the towel and slowly lift the hand weight up in front of you. Stop when the weight is raised slightly higher than your elbow.  5. Lower the weight back down.  6. Repeat 5 to 15 times, or as instructed.  Date Last Reviewed: 3/10/2016    6984-6750 [employer]. [address] [place] [address]. All rights reserved. This information is not intended as a substitute for professional medical care. Always follow your healthcare professional's instructions.                Follow-ups after your visit        Who to contact     If you have questions or need follow up information about today's clinic visit or your schedule please contact [facility] directly at [phone].  Normal or non-critical lab and imaging results will be communicated to you by MyChart, letter or phone within 4 business days after the clinic has received the results. If you do not hear from us within 7 days, please contact the clinic through [employer] or phone. If you have a critical or abnormal lab result, we will notify you by phone as soon as possible.  Submit refill requests through [employer] or call your pharmacy  "and they will forward the refill request to us. Please allow 3 business days for your refill to be completed.          Additional Information About Your Visit        MyChart Information     [employer] lets you send messages to your doctor, view your test results, renew your prescriptions, schedule appointments and more. To sign up, go to [email or web address] . Click on \"Log in\" on the left side of the screen, which will take you to the Welcome page. Then click on \"Sign up Now\" on the right side of the page.     You will be asked to enter the access code listed below, as well as some personal information. Please follow the directions to create your username and password.     Your access code is: [email or web address]  Expires: 2017 11:18 AM     Your access code will  in 90 days. If you need help or a new code, please call your [place] clinic or [phone].        Care EveryWhere ID     This is your [facility] EveryWhere ID. This could be used by other organizations to access your [place] medical records  [id number]        Your Vitals Were     Temperature Height BMI (Body Mass Index)             97.7  F (36.5  C) 1.651 m (5' 5\") 33.45 kg/m2          Blood Pressure from Last 3 Encounters:   08/10/17 136/80   17 138/80   17 114/56    Weight from Last 3 Encounters:   17 91.2 kg (201 lb)   08/10/17 89.8 kg (198 lb)   17 91.2 kg (201 lb)               Primary Care Provider Office Phone # Fax #    Chanell Nelson, APRN [facility] [phone] [address] [place] [address]        Equal Access to Services     [facility] GEMA : Janelle Call, soo granger, akash siddiqi, ct cuellar. So [facility] [phone].    ATENCIÓN: Si habla español, tiene a pedro disposición servicios gratuitos de asistencia lingüística. Llame al [phone].    We comply with applicable federal civil rights laws and [place] laws. We do not discriminate " on the basis of race, color, national origin, age, disability sex, sexual orientation or gender identity.            Thank you!     Thank you for choosing [facility]  for your care. Our goal is always to provide you with excellent care. Hearing back from our patients is one way we can continue to improve our services. Please take a few minutes to complete the written survey that you may receive in the mail after your visit with us. Thank you!             Your Updated Medication List - Protect others around you: Learn how to safely use, store and throw away your medicines at www.disposemymeds.org.          This list is accurate as of: 8/28/17  1:51 PM.  Always use your most recent med list.                   Brand Name Dispense Instructions for use Diagnosis    amitriptyline 25 MG tablet    ELAVIL    90 tablet    Take 1 tablet (25 mg) by mouth At Bedtime    Insomnia, unspecified       aspirin 81 MG chewable tablet     100 tablet    Take 1 tablet (81 mg) by mouth daily    Type 2 diabetes mellitus without complication, without long-term current use of insulin (H)       blood glucose lancets standard    no brand specified    1 Box    Glucose test strips Use to test blood sugar 1 times daily or as directed.    Type 2 diabetes mellitus without complication, without long-term current use of insulin (H)       blood glucose monitoring test strip    no brand specified    100 strip    Use to test blood sugars 1 times daily or as directed    Type 2 diabetes mellitus without complication, without long-term current use of insulin (H)       celecoxib 100 MG capsule    celeBREX    60 capsule    Take 1 capsule (100 mg) by mouth 2 times daily as needed for moderate pain    Primary osteoarthritis involving multiple joints       diclofenac 1 % Gel topical gel    VOLTAREN    100 g    Apply 4 grams to knees or 2 grams to hands four times daily using enclosed dosing card.    Primary osteoarthritis involving multiple joints        diltiazem 120 MG 24 hr capsule    CARDIZEM CD/CARTIA XT    90 capsule    Take 1 capsule (120 mg) by mouth daily        levothyroxine 50 MCG tablet    SYNTHROID/LEVOTHROID    90 tablet    Take 1 tablet (50 mcg) by mouth daily    Hypothyroidism, unspecified type       lisinopril-hydrochlorothiazide 20-12.5 MG per tablet    PRINZIDE/ZESTORETIC    270 tablet    TAKE TWO TABLETS BY MOUTH EVERY MORNING & 1 TABLET AT SUPPER    Type 2 diabetes mellitus without complication, without long-term current use of insulin (H)       metFORMIN 500 MG 24 hr tablet    GLUCOPHAGE-XR    270 tablet    TAKE ONE TABLET BY MOUTH EVERY MORNING AND TAKE TWO TABLETS BY MOUTH EVERY EVENING WITH MEALS    Type 2 diabetes mellitus without complication, without long-term current use of insulin (H)       MULTIVITAL PO      Take  by mouth. daily        oxybutynin 5 MG tablet    DITROPAN    240 tablet    Take 2 tablets (10 mg) by mouth 2 times daily as needed for bladder spasms    OAB (overactive bladder)       pantoprazole 40 MG EC tablet    PROTONIX    90 tablet    Take 1 tablet (40 mg) by mouth daily    Gastroesophageal reflux disease, esophagitis presence not specified       potassium chloride SA 10 MEQ CR tablet    K-DUR/KLOR-CON M    90 tablet    Take 1 tablet (10 mEq) by mouth daily    Hypokalemia       pravastatin 40 MG tablet    PRAVACHOL    90 tablet    Take 1 tablet (40 mg) by mouth daily    Hyperlipidemia with target LDL less than 100

## 2017-08-28 NOTE — PATIENT INSTRUCTIONS
Understanding Glenohumeral Osteoarthritis    A joint is a place where two bones meet. The glenohumeral joint is the main joint in the shoulder. It is a ball-and-socket joint. It is formed where the head or ball of the upper arm bone fits into the shallow socket on the shoulder blade. The upper arm bone is called the humerus. The socket is called the glenoid. This is how the joint gets its name. When the glenohumeral joint is healthy, it helps you rotate and move your arm freely without any pain.  With glenohumeral osteoarthritis, the parts of the joint wear down. This can cause pain, stiffness, and limited movement. Glenohumeral osteoarthritis is a long-term condition. But treatment can help manage symptoms, increase movement, and improve function.  How glenohumeral osteoarthritis occurs  All joints contain a smooth tissue called cartilage. Cartilage cushions the ends of bones. This helps them glide smoothly against each other. Glenohumeral osteoarthritis occurs when cartilage in the glenohumeral joint begins to break down. The ball and socket bones may then become exposed and rub together. The bones may become rough and pitted and may begin to wear away. This prevents smooth movement of the joint.  Causes of glenohumeral osteoarthritis  In most cases, the condition develops because of damage from a shoulder injury, such as a dislocated or broken shoulder. Normal wear and tear of the joint from aging can also cause osteoarthritis.  Symptoms of glenohumeral osteoarthritis  Symptoms tend to develop slowly over months to years. Shoulder pain is common. The pain is often worse with activity, and may get better with rest. Over time, the pain may worsen, and may even occur at night.  Stiffness in the shoulder is also common. It may be hard to move or use the arm and shoulder as you normally would. This can make even simple tasks difficult, such as reaching for an item on a shelf or getting dressed.  Treating  glenohumeral osteoarthritis  Treatment may include:    Resting the shoulder. This involves limiting certain movements, such as reaching, lifting, pushing, or pulling. These can cause further wear and tear of the joint and make symptoms worse.    Cold or heat packs. Cold packs can help reduce pain and swelling. Heat packs do not help with swelling. But they may help relieve pain and stiffness, especially before physical therapy or activity.    Pain medicines. These help relieve pain and swelling. Medicines may be prescribed or bought over the counter.    Injections of medicine into the joint. These help relieve symptoms for a time.    Physical therapy and exercises.  These help improve strength and range of motion in the joint. This may reduce shoulder stiffness and improve function.    Certain assistive devices. These are tools that can be used to make activities of daily life easier. For instance, a  grasper  can help you reach and grab items.    Alternative treatments. These include options, such as acupuncture or massage. They may help relieve pain and stiffness in some cases.  If other treatments don t do enough to relieve symptoms, you may need surgery. During surgery, the damaged joint is removed. It is then replaced with an artificial joint. This can help relieve symptoms and improve function to near normal.     When to call your healthcare provider  Call your healthcare provider right away if you have any of these:    Fever of 100.4 F (38 C) or higher, or as directed    Symptoms that don t get better with treatment, or get worse    New symptoms   Date Last Reviewed: 3/10/2016    1757-1341 [employer]. [address] [place] [address], [place] [id number]. All rights reserved. This information is not intended as a substitute for professional medical care. Always follow your healthcare professional's instructions.        Shoulder External Rotation, Side-Lying (Strength)    This exercise is for your right  shoulder joint. Switch sides if the problem is in your left shoulder joint.  1. Lie on your left side with your head supported by a pillow. Place a small rolled-up towel under your right elbow.  2. Hold a hand weight in your right hand. Your healthcare provider will tell you what size hand weight to use.  3. Bend your arm in front of you at a right angle. Then bend it down and bring the hand weight to the floor. Rest your forearm on your stomach.  4. Keep your elbow on the towel and slowly lift the hand weight up in front of you. Stop when the weight is raised slightly higher than your elbow.  5. Lower the weight back down.  6. Repeat 5 to 15 times, or as instructed.  Date Last Reviewed: 3/10/2016    6040-3031 [employer]. [address] [place] [address], [place] [address]. All rights reserved. This information is not intended as a substitute for professional medical care. Always follow your healthcare professional's instructions.

## 2017-08-28 NOTE — LETTER
8/28/2017         RE: Mya Hui  [address]  [place] [address]        Dear Colleague,    Thank you for referring your patient, Mya Hui, to the [facility]. Please see a copy of my visit note below.    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      She is being seen for   Chief Complaints and History of Present Illnesses   Patient presents with     Consult     rt shoulder pain per MAYI Anthony CNP         History of Present Illness:   Mya Hui is a 77 year old female who is seen in consultation at the request of MAYI Anthony CNP.  History of Present illness:  Mya presents for evaluation of:  1.) rt shoulder  Onset:  December and 2016    Symptoms brought on by fell on ice onto right side, had a rib injury  Location:  rt shoulder .    Character:  sharp, dull ache, stabbing and burning.    Progression of symptoms:  worse.    Previous similar pain: YES.   Pain Level:  3/10.   Previous treatments:  heat, ice and deep heating rub. celebrex does help.  Cream does not.  Currently on Blood thinners? no  Diagnosis of Diabetes? Yes   Had some problems before the fall but thought it was arthritis.  Has terrible pain with use, sewing, reaching  Sleeping okay  Better with rest      Patient's past medical, surgical, social and family histories reviewed.     Past Medical History:   Diagnosis Date     Arthritis      Diabetes type 2, controlled (H)      GERD (gastroesophageal reflux disease)      History of renal calculi      HTN, goal below 140/90      Hyperlipidaemia LDL goal < 100      Hypothyroid      Insomnia      Left carotid stenosis      Sciatica of right side 6/28/2013       Past Surgical History:   Procedure Laterality Date     BUNIONECTOMY      Right foot     COLONOSCOPY  7/12/2013    Procedure: COLONOSCOPY;  Colonoscopy;  Surgeon: Alexis  Giovany Marte MD;  Location: [facility]     FRACTURE TX, ANKLE RT/LT      Left ankle     HC CYSTOURETHROSCOPY W/ URETEROSCOPY &/OR PYELOSCOPY; W/ LITHOTRIPSY       HC REVISE MEDIAN N/CARPAL TUNNEL SURG      Right wrist     INJECT JOINT SACROILIAC  4/10/2014    Procedure: INJECT JOINT SACROILIAC;  Sacroiliac Joint Injection;  Surgeon: Gilbert Mcclure MD;  Location: [facility]       Medications:    Current Outpatient Prescriptions on File Prior to Visit:  celecoxib (CELEBREX) 100 MG capsule Take 1 capsule (100 mg) by mouth 2 times daily as needed for moderate pain   aspirin 81 MG chewable tablet Take 1 tablet (81 mg) by mouth daily   levothyroxine (SYNTHROID/LEVOTHROID) 50 MCG tablet Take 1 tablet (50 mcg) by mouth daily   diltiazem 120 MG 24 hr capsule Take 1 capsule (120 mg) by mouth daily   lisinopril-hydrochlorothiazide (PRINZIDE/ZESTORETIC) 20-12.5 MG per tablet TAKE TWO TABLETS BY MOUTH EVERY MORNING & 1 TABLET AT SUPPER   metFORMIN (GLUCOPHAGE-XR) 500 MG 24 hr tablet TAKE ONE TABLET BY MOUTH EVERY MORNING AND TAKE TWO TABLETS BY MOUTH EVERY EVENING WITH MEALS   oxybutynin (DITROPAN) 5 MG tablet Take 2 tablets (10 mg) by mouth 2 times daily as needed for bladder spasms   diclofenac (VOLTAREN) 1 % GEL topical gel Apply 4 grams to knees or 2 grams to hands four times daily using enclosed dosing card.   blood glucose (NO BRAND SPECIFIED) lancets standard Glucose test stripsUse to test blood sugar 1 times daily or as directed.   blood glucose monitoring (NO BRAND SPECIFIED) test strip Use to test blood sugars 1 times daily or as directed   amitriptyline (ELAVIL) 25 MG tablet Take 1 tablet (25 mg) by mouth At Bedtime   pravastatin (PRAVACHOL) 40 MG tablet Take 1 tablet (40 mg) by mouth daily   potassium chloride SA (K-DUR/KLOR-CON M) 10 MEQ CR tablet Take 1 tablet (10 mEq) by mouth daily   pantoprazole (PROTONIX) 40 MG EC tablet Take 1 tablet (40 mg) by mouth daily   Multiple Vitamins-Minerals (MULTIVITAL PO) Take  by mouth.  "daily     No current facility-administered medications on file prior to visit.     Allergies   Allergen Reactions     Codeine Nausea     Fentanyl Nausea and Vomiting     VERY sensitive to most narcotics if not all.       Social History     Occupational History      Retired           's office part time     Social History Main Topics     Smoking status: Never Smoker     Smokeless tobacco: Never Used     Alcohol use Yes     Drug use: No     Sexual activity: Not Currently       Family History   Problem Relation Age of Onset     Hypertension Brother      DIABETES Father      Cardiovascular Father      DIABETES Brother      Borderline     Breast Cancer Mother      DIABETES Brother      Blood Disease Brother      Cardiovascular Brother      MI       REVIEW OF SYSTEMS  10 point review systems performed otherwise negative as noted as per history of present illness.    Physical Exam:  Vitals: Temp 97.7  F (36.5  C)  Ht 1.651 m (5' 5\")  Wt 91.2 kg (201 lb)  BMI 33.45 kg/m2  BMI= Body mass index is 33.45 kg/(m^2).    Constitutional: healthy, alert and no acute distress   Psychiatric: mentation appears normal and affect normal/bright  NEURO: no focal deficits  RESP: Normal with easy respirations and no use of accessory muscles to breathe, no audible wheezing or retractions  CV: regular pulse  SKIN: No erythema, rashes, excoriation, or breakdown. No evidence of infection.   JOINT/EXTREMITIES:right shoulder - FROM, positive impingement, well-compensated strength in rotator cuff, pain with abduction  GAIT: not tested   Lymph: no palpable lymph nodes    Diagnostic Modalities:  right shoulder X-ray: with minimal glenohumeral joint space narrowing  right shoulder MRI:  Rotator cuff full thickness tears: Supraspinatus, Infraspinatus and large tear to my read with markedly thinned muscle.  Independent visualization of the images was performed.      Impression: right Rotator cuff arthropathy - recommend nonop " treatment    Plan:  All of the above pertinent physical exam and imaging modalities findings was reviewed with Mya and her daughter.                                          CONSERVATIVE CARE:  I recommend conservative care for the patient to include NSAIDs, Tylenol, focused self directed physical therapy, formal physical therapy, activity modifications. Today I provided or dispensed physical therapy, info and hep. Can alternate celebrex and tylenol.                                        INJECTION PROCEDURE:  The patient was counseled about an  injection, including discussion of risks (including infection), contents of the injection, rationale for performing the injection, and expected benefits of the injection. The skin was prepped with alcohol and betadine and then utilizing sterile technique an injection of the right shoulder subacromial space from the anterolateral approach  was performed. The injection consisted 1ml of Kenalog (40mg per 1ml) with 8ml 1% lidocaine plain. The patient tolerated the injection well, and there were no complications. The injection site was covered with a Band-Aid. The injection was performed by Annamaria Carpio M.D.                                                FUTURE PLAN:  On their return if they still have symptoms we will consider injection of steroids.      Return to clinic 6, week(s), or sooner as needed for changes.  Re-x-ray on return: No    Annamaria Carpio M.D.    Again, thank you for allowing me to participate in the care of your patient.        Sincerely,        Annamaria Carpio MD

## 2017-09-03 ENCOUNTER — HOSPITAL ENCOUNTER (OUTPATIENT)
Facility: CLINIC | Age: 77
Setting detail: OBSERVATION
Discharge: HOME | End: 2017-09-05
Attending: FAMILY MEDICINE | Admitting: FAMILY MEDICINE
Payer: MEDICARE

## 2017-09-03 ENCOUNTER — APPOINTMENT (OUTPATIENT)
Dept: CT IMAGING | Facility: CLINIC | Age: 77
End: 2017-09-03
Attending: FAMILY MEDICINE
Payer: MEDICARE

## 2017-09-03 ENCOUNTER — APPOINTMENT (OUTPATIENT)
Dept: GENERAL RADIOLOGY | Facility: CLINIC | Age: 77
End: 2017-09-03
Attending: FAMILY MEDICINE
Payer: MEDICARE

## 2017-09-03 DIAGNOSIS — E11.9 TYPE 2 DIABETES MELLITUS WITHOUT COMPLICATION, WITHOUT LONG-TERM CURRENT USE OF INSULIN (H): ICD-10-CM

## 2017-09-03 DIAGNOSIS — R09.02 HYPOXEMIA: ICD-10-CM

## 2017-09-03 DIAGNOSIS — T39.391A: ICD-10-CM

## 2017-09-03 DIAGNOSIS — K29.70 GASTRITIS, UNSPECIFIED, WITHOUT BLEEDING: ICD-10-CM

## 2017-09-03 DIAGNOSIS — K44.9 HIATAL HERNIA: ICD-10-CM

## 2017-09-03 DIAGNOSIS — K21.9 GASTROESOPHAGEAL REFLUX DISEASE, ESOPHAGITIS PRESENCE NOT SPECIFIED: ICD-10-CM

## 2017-09-03 DIAGNOSIS — R11.2 INTRACTABLE VOMITING WITH NAUSEA, UNSPECIFIED VOMITING TYPE: ICD-10-CM

## 2017-09-03 PROBLEM — E87.6 HYPOKALEMIA: Status: ACTIVE | Noted: 2017-09-03

## 2017-09-03 LAB
ALBUMIN SERPL-MCNC: 3.4 G/DL (ref 3.4–5)
ALBUMIN UR-MCNC: NEGATIVE MG/DL
ALP SERPL-CCNC: 48 U/L (ref 40–150)
ALT SERPL W P-5'-P-CCNC: 29 U/L (ref 0–50)
ANION GAP SERPL CALCULATED.3IONS-SCNC: 11 MMOL/L (ref 3–14)
APPEARANCE UR: CLEAR
AST SERPL W P-5'-P-CCNC: 17 U/L (ref 0–45)
BASE EXCESS BLDV CALC-SCNC: 2.5 MMOL/L
BASOPHILS # BLD AUTO: 0 10E9/L (ref 0–0.2)
BASOPHILS NFR BLD AUTO: 0.2 %
BILIRUB SERPL-MCNC: 0.4 MG/DL (ref 0.2–1.3)
BILIRUB UR QL STRIP: NEGATIVE
BUN SERPL-MCNC: 29 MG/DL (ref 7–30)
CALCIUM SERPL-MCNC: 8.6 MG/DL (ref 8.5–10.1)
CHLORIDE SERPL-SCNC: 103 MMOL/L (ref 94–109)
CO2 SERPL-SCNC: 27 MMOL/L (ref 20–32)
COLOR UR AUTO: ABNORMAL
CREAT SERPL-MCNC: 0.84 MG/DL (ref 0.52–1.04)
DIFFERENTIAL METHOD BLD: ABNORMAL
EOSINOPHIL # BLD AUTO: 0.1 10E9/L (ref 0–0.7)
EOSINOPHIL NFR BLD AUTO: 0.9 %
ERYTHROCYTE [DISTWIDTH] IN BLOOD BY AUTOMATED COUNT: 13.5 % (ref 10–15)
GFR SERPL CREATININE-BSD FRML MDRD: 66 ML/MIN/1.7M2
GLUCOSE SERPL-MCNC: 129 MG/DL (ref 70–99)
GLUCOSE UR STRIP-MCNC: 50 MG/DL
HCO3 BLDV-SCNC: 29 MMOL/L (ref 21–28)
HCT VFR BLD AUTO: 35.1 % (ref 35–47)
HGB BLD-MCNC: 10.9 G/DL (ref 11.7–15.7)
HGB UR QL STRIP: NEGATIVE
IMM GRANULOCYTES # BLD: 0 10E9/L (ref 0–0.4)
IMM GRANULOCYTES NFR BLD: 0.2 %
KETONES UR STRIP-MCNC: NEGATIVE MG/DL
LACTATE BLD-SCNC: 1.6 MMOL/L (ref 0.7–2)
LACTATE BLD-SCNC: 2.4 MMOL/L (ref 0.7–2)
LEUKOCYTE ESTERASE UR QL STRIP: NEGATIVE
LIPASE SERPL-CCNC: 156 U/L (ref 73–393)
LYMPHOCYTES # BLD AUTO: 1.4 10E9/L (ref 0.8–5.3)
LYMPHOCYTES NFR BLD AUTO: 16.6 %
MCH RBC QN AUTO: 26.7 PG (ref 26.5–33)
MCHC RBC AUTO-ENTMCNC: 31.1 G/DL (ref 31.5–36.5)
MCV RBC AUTO: 86 FL (ref 78–100)
MONOCYTES # BLD AUTO: 0.7 10E9/L (ref 0–1.3)
MONOCYTES NFR BLD AUTO: 8.4 %
NEUTROPHILS # BLD AUTO: 6 10E9/L (ref 1.6–8.3)
NEUTROPHILS NFR BLD AUTO: 73.7 %
NITRATE UR QL: NEGATIVE
O2/TOTAL GAS SETTING VFR VENT: 21 %
PCO2 BLDV: 50 MM HG (ref 40–50)
PH BLDV: 7.37 PH (ref 7.32–7.43)
PH UR STRIP: 6 PH (ref 5–7)
PLATELET # BLD AUTO: 228 10E9/L (ref 150–450)
PO2 BLDV: 30 MM HG (ref 25–47)
POTASSIUM SERPL-SCNC: 3.3 MMOL/L (ref 3.4–5.3)
PROT SERPL-MCNC: 6.7 G/DL (ref 6.8–8.8)
RBC # BLD AUTO: 4.08 10E12/L (ref 3.8–5.2)
SODIUM SERPL-SCNC: 141 MMOL/L (ref 133–144)
SOURCE: ABNORMAL
SP GR UR STRIP: 1.02 (ref 1–1.03)
TROPONIN I SERPL-MCNC: 0.03 UG/L (ref 0–0.04)
UROBILINOGEN UR STRIP-MCNC: 0 MG/DL (ref 0–2)
WBC # BLD AUTO: 8.2 10E9/L (ref 4–11)

## 2017-09-03 PROCEDURE — 99219 ZZC INITIAL OBSERVATION CARE,LEVL II: CPT | Performed by: FAMILY MEDICINE

## 2017-09-03 PROCEDURE — 74176 CT ABD & PELVIS W/O CONTRAST: CPT

## 2017-09-03 PROCEDURE — 80053 COMPREHEN METABOLIC PANEL: CPT | Performed by: FAMILY MEDICINE

## 2017-09-03 PROCEDURE — 99207 ZZC MOONLIGHTING INDICATOR: CPT | Performed by: FAMILY MEDICINE

## 2017-09-03 PROCEDURE — 99285 EMERGENCY DEPT VISIT HI MDM: CPT | Mod: 25 | Performed by: FAMILY MEDICINE

## 2017-09-03 PROCEDURE — 93005 ELECTROCARDIOGRAM TRACING: CPT | Performed by: FAMILY MEDICINE

## 2017-09-03 PROCEDURE — 25000128 H RX IP 250 OP 636: Performed by: FAMILY MEDICINE

## 2017-09-03 PROCEDURE — 84100 ASSAY OF PHOSPHORUS: CPT | Performed by: FAMILY MEDICINE

## 2017-09-03 PROCEDURE — 83690 ASSAY OF LIPASE: CPT | Performed by: FAMILY MEDICINE

## 2017-09-03 PROCEDURE — 82803 BLOOD GASES ANY COMBINATION: CPT | Performed by: FAMILY MEDICINE

## 2017-09-03 PROCEDURE — 84484 ASSAY OF TROPONIN QUANT: CPT | Performed by: FAMILY MEDICINE

## 2017-09-03 PROCEDURE — 83735 ASSAY OF MAGNESIUM: CPT | Performed by: FAMILY MEDICINE

## 2017-09-03 PROCEDURE — 81003 URINALYSIS AUTO W/O SCOPE: CPT | Performed by: FAMILY MEDICINE

## 2017-09-03 PROCEDURE — 96374 THER/PROPH/DIAG INJ IV PUSH: CPT | Performed by: FAMILY MEDICINE

## 2017-09-03 PROCEDURE — 83605 ASSAY OF LACTIC ACID: CPT | Performed by: FAMILY MEDICINE

## 2017-09-03 PROCEDURE — 87040 BLOOD CULTURE FOR BACTERIA: CPT | Performed by: FAMILY MEDICINE

## 2017-09-03 PROCEDURE — 71010 XR CHEST PORT 1 VW: CPT | Mod: TC

## 2017-09-03 PROCEDURE — 36415 COLL VENOUS BLD VENIPUNCTURE: CPT | Performed by: FAMILY MEDICINE

## 2017-09-03 PROCEDURE — 85025 COMPLETE CBC W/AUTO DIFF WBC: CPT | Performed by: FAMILY MEDICINE

## 2017-09-03 PROCEDURE — 93010 ELECTROCARDIOGRAM REPORT: CPT | Mod: Z6 | Performed by: FAMILY MEDICINE

## 2017-09-03 RX ORDER — ONDANSETRON 2 MG/ML
4 INJECTION INTRAMUSCULAR; INTRAVENOUS EVERY 30 MIN PRN
Status: DISCONTINUED | OUTPATIENT
Start: 2017-09-03 | End: 2017-09-04

## 2017-09-03 RX ORDER — SODIUM CHLORIDE 9 MG/ML
INJECTION, SOLUTION INTRAVENOUS CONTINUOUS
Status: ACTIVE | OUTPATIENT
Start: 2017-09-03 | End: 2017-09-03

## 2017-09-03 RX ORDER — SODIUM CHLORIDE 9 MG/ML
INJECTION, SOLUTION INTRAVENOUS CONTINUOUS
Status: DISCONTINUED | OUTPATIENT
Start: 2017-09-03 | End: 2017-09-03

## 2017-09-03 RX ADMIN — ONDANSETRON 4 MG: 2 SOLUTION INTRAMUSCULAR; INTRAVENOUS at 20:46

## 2017-09-03 RX ADMIN — SODIUM CHLORIDE: 9 INJECTION, SOLUTION INTRAVENOUS at 20:42

## 2017-09-03 ASSESSMENT — ENCOUNTER SYMPTOMS
WEAKNESS: 0
SHORTNESS OF BREATH: 0
DIZZINESS: 0
DYSURIA: 0
CONSTIPATION: 0
HEADACHES: 0
COUGH: 0
NUMBNESS: 0
LIGHT-HEADEDNESS: 0
VOMITING: 1
FATIGUE: 1
CHILLS: 1
DIAPHORESIS: 0
HEMATURIA: 0
NAUSEA: 1
DIARRHEA: 0
BLOOD IN STOOL: 0
ABDOMINAL PAIN: 1
FEVER: 0
FREQUENCY: 0
BACK PAIN: 0

## 2017-09-03 NOTE — IP AVS SNAPSHOT
MRN:[id number]                      After Visit Summary   9/3/2017    Mya Hui    MRN: [id number]           Thank you!     Thank you for choosing [facility] for your care. Our goal is always to provide you with excellent care. Hearing back from our patients is one way we can continue to improve our services. Please take a few minutes to complete the written survey that you may receive in the mail after you visit with us. Thank you!        Patient Information     Date Of Birth          1940        About your hospital stay     You were admitted on:  September 4, 2017 You last received care in the:  [address]    You were discharged on:  September 5, 2017        Reason for your hospital stay       Nausea, vomiting, and abdominal discomfort which may have a virus component but may also be related to you recently restarting Celebrex.  You should continue to remain off the Celebrex medication and increase your Protonix to twice a day for the next month to try to settle down your stomach and heartburn symptoms.  Please follow up with Chanell Nelson within 2 weeks to make sure your symptoms continue to improve.                  Who to Call     For medical emergencies, please call 911.  For non-urgent questions about your medical care, please call your primary care provider or clinic, [phone]          Attending Provider     Provider Specialty    Naman Leach DO Family Practice    Giovany Purvis MD Family Practice       Primary Care Provider Office Phone # Fax #    MAYI Anthony [facility] [phone]      After Care Instructions     Activity       Your activity upon discharge: activity as tolerated            Diet       Follow this diet upon discharge: Moderate consistent carbohydrate diabetic diet (4258-3177 daisy / 4-6 CHO units per meal)                  Follow-up Appointments     Follow-up and recommended labs and tests         "Follow up with primary care provider, Chanell Nelson, within 14 days for hospital follow- up.                  Your next 10 appointments already scheduled     Sep 07, 2017  9:00 AM CDT   Ortho Eval with Myla Delaney PT   [facility] ([address] [place] [address]   [phone]            Sep 14, 2017  3:15 PM CDT   Office Visit with MAYI Anthony CNP   [facility] ([facility])    [address] [place] [address]   [phone]           Bring a current list of meds and any records pertaining to this visit. For Physicals, please bring immunization records and any forms needing to be filled out. Please arrive 10 minutes early to complete paperwork.            Oct 09, 2017  1:00 PM CDT   Return Visit with Annamaria Carpio MD   [address] [address]   [phone]              Pending Results     Date and Time Order Name Status Description    9/3/2017 2119 Blood culture Preliminary     9/3/2017 2118 Blood culture Preliminary             Statement of Approval     Ordered          09/05/17 1048  I have reviewed and agree with all the recommendations and orders detailed in this document.  EFFECTIVE NOW     Approved and electronically signed by:  Nereyda Erickson MD             Admission Information     Date & Time Provider Department Dept. Phone    9/3/2017 Giovany Purvis MD [address] [facility] [phone]      Your Vitals Were     Blood Pressure Pulse Temperature Respirations Height Weight    153/83 (BP Location: Right arm) 56 97.2  F (36.2  C) (Oral) 20 1.626 m (5' 4\") 92.5 kg (203 lb 14.8 oz)    Pulse Oximetry BMI (Body Mass Index)                94% 35 kg/m2          MyChart Information     [employer] lets you send messages to your doctor, view your test results, renew your " "prescriptions, schedule appointments and more. To sign up, go to [email or web address] . Click on \"Log in\" on the left side of the screen, which will take you to the Welcome page. Then click on \"Sign up Now\" on the right side of the page.     You will be asked to enter the access code listed below, as well as some personal information. Please follow the directions to create your username and password.     Your access code is: [email or web address]  Expires: 2017 11:01 AM     Your access code will  in 90 days. If you need help or a new code, please call your [place] clinic or [phone].        Care EveryWhere ID     This is your Care EveryWhere ID. This could be used by other organizations to access your [place] medical records  [id number]        Equal Access to Services     JANETTE RIBEIRO : Janelle Call, soo granger, akash siddiqi, ct johnson . So [facility] [phone].    ATENCIÓN: Si habla español, tiene a pedro disposición servicios gratuitos de asistencia lingüística. Llnerissa al [phone].    We comply with applicable federal civil rights laws and [place] laws. We do not discriminate on the basis of race, color, national origin, age, disability sex, sexual orientation or gender identity.               Review of your medicines      START taking        Dose / Directions    acetaminophen 325 MG tablet   Commonly known as:  TYLENOL        Dose:  650 mg   Take 2 tablets (650 mg) by mouth every 4 hours as needed for mild pain   Quantity:  100 tablet   Refills:  0       alum & mag hydroxide-simethicone 400-400-40 MG/5ML Susp suspension   Commonly known as:  MYLANTA ES/MAALOX  ES        Dose:  30 mL   Take 30 mLs by mouth every 4 hours as needed for indigestion   Refills:  0         CONTINUE these medicines which may have CHANGED, or have new prescriptions. If we are uncertain of the size of tablets/capsules you have at home, strength may be listed as " something that might have changed.        Dose / Directions    lisinopril-hydrochlorothiazide 20-12.5 MG per tablet   Commonly known as:  PRINZIDE/ZESTORETIC   This may have changed:    - how much to take  - how to take this  - when to take this  - additional instructions   Used for:  Type 2 diabetes mellitus without complication, without long-term current use of insulin (H)        Dose:  1 tablet   Take 1 tablet by mouth 2 times daily   Quantity:  60 tablet   Refills:  0       metFORMIN 500 MG 24 hr tablet   Commonly known as:  GLUCOPHAGE-XR   This may have changed:  additional instructions   Used for:  Type 2 diabetes mellitus without complication, without long-term current use of insulin (H)        For the next few days, continue to take your 2 tabs every evening with meals but hold your morning dose until your appetite returns to normal.  Then resume: ONE TABLET BY MOUTH EVERY MORNING AND TAKE TWO TABLETS BY MOUTH EVERY EVENING WITH MEALS   Quantity:  270 tablet   Refills:  2       pantoprazole 40 MG EC tablet   Commonly known as:  PROTONIX   This may have changed:  when to take this   Used for:  Gastroesophageal reflux disease, esophagitis presence not specified        Dose:  40 mg   Take 1 tablet (40 mg) by mouth 2 times daily (before meals)   Quantity:  60 tablet   Refills:  0         CONTINUE these medicines which have NOT CHANGED        Dose / Directions    amitriptyline 25 MG tablet   Commonly known as:  ELAVIL   Used for:  Insomnia, unspecified        Dose:  25 mg   Take 1 tablet (25 mg) by mouth At Bedtime   Quantity:  90 tablet   Refills:  2       aspirin 81 MG chewable tablet   Used for:  Type 2 diabetes mellitus without complication, without long-term current use of insulin (H)        Dose:  81 mg   Take 1 tablet (81 mg) by mouth daily   Quantity:  100 tablet   Refills:  1       blood glucose lancets standard   Commonly known as:  no brand specified   Used for:  Type 2 diabetes mellitus without  complication, without long-term current use of insulin (H)        Glucose test strips Use to test blood sugar 1 times daily or as directed.   Quantity:  1 Box   Refills:  11       blood glucose monitoring test strip   Commonly known as:  no brand specified   Used for:  Type 2 diabetes mellitus without complication, without long-term current use of insulin (H)        Use to test blood sugars 1 times daily or as directed   Quantity:  100 strip   Refills:  3       diclofenac 1 % Gel topical gel   Commonly known as:  VOLTAREN   Used for:  Primary osteoarthritis involving multiple joints        Apply 4 grams to knees or 2 grams to hands four times daily using enclosed dosing card.   Quantity:  100 g   Refills:  3       diltiazem 120 MG 24 hr capsule   Commonly known as:  CARDIZEM CD/CARTIA XT        Dose:  120 mg   Take 1 capsule (120 mg) by mouth daily   Quantity:  90 capsule   Refills:  2       levothyroxine 50 MCG tablet   Commonly known as:  SYNTHROID/LEVOTHROID   Used for:  Hypothyroidism, unspecified type        Dose:  50 mcg   Take 1 tablet (50 mcg) by mouth daily   Quantity:  90 tablet   Refills:  2       MULTIVITAL PO        Take  by mouth. daily   Refills:  0       potassium chloride 10 MEQ tablet   Commonly known as:  K-TAB,KLOR-CON   Used for:  Hypokalemia        TAKE ONE TABLET BY MOUTH ONCE DAILY   Quantity:  90 tablet   Refills:  3       pravastatin 40 MG tablet   Commonly known as:  PRAVACHOL   Used for:  Hyperlipidemia with target LDL less than 100        TAKE ONE TABLET BY MOUTH ONCE DAILY   Quantity:  90 tablet   Refills:  3         STOP taking     celecoxib 100 MG capsule   Commonly known as:  celeBREX           oxybutynin 5 MG tablet   Commonly known as:  DITROPAN                Where to get your medicines      These medications were sent to [id number] - [place], [place] - [address] [facility]  [address] [place] [address]    Hours:  Formerly Adeel aden unchanged   9/8/03  Phone:  [phone]      lisinopril-hydrochlorothiazide 20-12.5 MG per tablet    pantoprazole 40 MG EC tablet                Protect others around you: Learn how to safely use, store and throw away your medicines at www.disposemymeds.org.             Medication List: This is a list of all your medications and when to take them. Check marks below indicate your daily home schedule. Keep this list as a reference.      Medications           Morning Afternoon Evening Bedtime As Needed    acetaminophen 325 MG tablet   Commonly known as:  TYLENOL   Take 2 tablets (650 mg) by mouth every 4 hours as needed for mild pain                                   alum & mag hydroxide-simethicone 400-400-40 MG/5ML Susp suspension   Commonly known as:  MYLANTA ES/MAALOX  ES   Take 30 mLs by mouth every 4 hours as needed for indigestion   Last time this was given:  30 mLs on 9/4/2017  6:29 PM                                   amitriptyline 25 MG tablet   Commonly known as:  ELAVIL   Take 1 tablet (25 mg) by mouth At Bedtime   Last time this was given:  25 mg on 9/4/2017 10:34 PM                                   aspirin 81 MG chewable tablet   Take 1 tablet (81 mg) by mouth daily                                   blood glucose lancets standard   Commonly known as:  no brand specified   Glucose test strips Use to test blood sugar 1 times daily or as directed.                                blood glucose monitoring test strip   Commonly known as:  no brand specified   Use to test blood sugars 1 times daily or as directed                                diclofenac 1 % Gel topical gel   Commonly known as:  VOLTAREN   Apply 4 grams to knees or 2 grams to hands four times daily using enclosed dosing card.                                diltiazem 120 MG 24 hr capsule   Commonly known as:  CARDIZEM CD/CARTIA XT   Take 1 capsule (120 mg) by mouth daily                                   levothyroxine 50 MCG tablet   Commonly known as:  SYNTHROID/LEVOTHROID   Take 1  tablet (50 mcg) by mouth daily   Last time this was given:  50 mcg on 9/5/2017  6:53 AM                                   lisinopril-hydrochlorothiazide 20-12.5 MG per tablet   Commonly known as:  PRINZIDE/ZESTORETIC   Take 1 tablet by mouth 2 times daily   Last time this was given:  1 tablet on 9/5/2017  8:55 AM                                      metFORMIN 500 MG 24 hr tablet   Commonly known as:  GLUCOPHAGE-XR   For the next few days, continue to take your 2 tabs every evening with meals but hold your morning dose until your appetite returns to normal.  Then resume: ONE TABLET BY MOUTH EVERY MORNING AND TAKE TWO TABLETS BY MOUTH EVERY EVENING WITH MEALS   Last time this was given:  500 mg on 9/4/2017  7:46 PM                                MULTIVITAL PO   Take  by mouth. daily                                   pantoprazole 40 MG EC tablet   Commonly known as:  PROTONIX   Take 1 tablet (40 mg) by mouth 2 times daily (before meals)   Last time this was given:  40 mg on 9/5/2017  6:53 AM                                      potassium chloride 10 MEQ tablet   Commonly known as:  K-TAB,KLOR-CON   TAKE ONE TABLET BY MOUTH ONCE DAILY                                   pravastatin 40 MG tablet   Commonly known as:  PRAVACHOL   TAKE ONE TABLET BY MOUTH ONCE DAILY

## 2017-09-03 NOTE — IP AVS SNAPSHOT
[address] [facility]    [address] [facility]     [place] [address]    Phone:  [phone]                                       After Visit Summary   9/3/2017    Mya Hui    MRN: [id number]           After Visit Summary Signature Page     I have received my discharge instructions, and my questions have been answered. I have discussed any challenges I see with this plan with the nurse or doctor.    ..........................................................................................................................................  Patient/Patient Representative Signature      ..........................................................................................................................................  Patient Representative Print Name and Relationship to Patient    ..................................................               ................................................  Date                                            Time    ..........................................................................................................................................  Reviewed by Signature/Title    ...................................................              ..............................................  Date                                                            Time

## 2017-09-04 PROBLEM — E86.0 DEHYDRATION: Status: ACTIVE | Noted: 2017-09-04

## 2017-09-04 LAB
ANION GAP SERPL CALCULATED.3IONS-SCNC: 10 MMOL/L (ref 3–14)
BASOPHILS # BLD AUTO: 0 10E9/L (ref 0–0.2)
BASOPHILS NFR BLD AUTO: 0.4 %
BUN SERPL-MCNC: 21 MG/DL (ref 7–30)
CALCIUM SERPL-MCNC: 8.2 MG/DL (ref 8.5–10.1)
CHLORIDE SERPL-SCNC: 107 MMOL/L (ref 94–109)
CO2 SERPL-SCNC: 26 MMOL/L (ref 20–32)
CREAT SERPL-MCNC: 0.75 MG/DL (ref 0.52–1.04)
DIFFERENTIAL METHOD BLD: ABNORMAL
EOSINOPHIL # BLD AUTO: 0.1 10E9/L (ref 0–0.7)
EOSINOPHIL NFR BLD AUTO: 0.9 %
ERYTHROCYTE [DISTWIDTH] IN BLOOD BY AUTOMATED COUNT: 13.4 % (ref 10–15)
GFR SERPL CREATININE-BSD FRML MDRD: 76 ML/MIN/1.7M2
GLUCOSE BLDC GLUCOMTR-MCNC: 104 MG/DL (ref 70–99)
GLUCOSE BLDC GLUCOMTR-MCNC: 131 MG/DL (ref 70–99)
GLUCOSE BLDC GLUCOMTR-MCNC: 148 MG/DL (ref 70–99)
GLUCOSE BLDC GLUCOMTR-MCNC: 82 MG/DL (ref 70–99)
GLUCOSE BLDC GLUCOMTR-MCNC: 94 MG/DL (ref 70–99)
GLUCOSE SERPL-MCNC: 112 MG/DL (ref 70–99)
HBA1C MFR BLD: 6.1 % (ref 4.3–6)
HCT VFR BLD AUTO: 33 % (ref 35–47)
HGB BLD-MCNC: 10.4 G/DL (ref 11.7–15.7)
IMM GRANULOCYTES # BLD: 0 10E9/L (ref 0–0.4)
IMM GRANULOCYTES NFR BLD: 0.1 %
LYMPHOCYTES # BLD AUTO: 1.5 10E9/L (ref 0.8–5.3)
LYMPHOCYTES NFR BLD AUTO: 17.3 %
MAGNESIUM SERPL-MCNC: 1.5 MG/DL (ref 1.6–2.3)
MAGNESIUM SERPL-MCNC: 2.6 MG/DL (ref 1.6–2.3)
MCH RBC QN AUTO: 27 PG (ref 26.5–33)
MCHC RBC AUTO-ENTMCNC: 31.5 G/DL (ref 31.5–36.5)
MCV RBC AUTO: 86 FL (ref 78–100)
MONOCYTES # BLD AUTO: 0.7 10E9/L (ref 0–1.3)
MONOCYTES NFR BLD AUTO: 7.6 %
NEUTROPHILS # BLD AUTO: 6.3 10E9/L (ref 1.6–8.3)
NEUTROPHILS NFR BLD AUTO: 73.7 %
PHOSPHATE SERPL-MCNC: 3.3 MG/DL (ref 2.5–4.5)
PLATELET # BLD AUTO: 227 10E9/L (ref 150–450)
POTASSIUM SERPL-SCNC: 3.8 MMOL/L (ref 3.4–5.3)
POTASSIUM SERPL-SCNC: 4.2 MMOL/L (ref 3.4–5.3)
RBC # BLD AUTO: 3.85 10E12/L (ref 3.8–5.2)
SODIUM SERPL-SCNC: 143 MMOL/L (ref 133–144)
WBC # BLD AUTO: 8.6 10E9/L (ref 4–11)

## 2017-09-04 PROCEDURE — 83735 ASSAY OF MAGNESIUM: CPT | Performed by: FAMILY MEDICINE

## 2017-09-04 PROCEDURE — 83036 HEMOGLOBIN GLYCOSYLATED A1C: CPT | Performed by: FAMILY MEDICINE

## 2017-09-04 PROCEDURE — 25000128 H RX IP 250 OP 636: Performed by: FAMILY MEDICINE

## 2017-09-04 PROCEDURE — A9270 NON-COVERED ITEM OR SERVICE: HCPCS | Mod: GY | Performed by: FAMILY MEDICINE

## 2017-09-04 PROCEDURE — 25000132 ZZH RX MED GY IP 250 OP 250 PS 637: Mod: GY | Performed by: FAMILY MEDICINE

## 2017-09-04 PROCEDURE — 85025 COMPLETE CBC W/AUTO DIFF WBC: CPT | Performed by: FAMILY MEDICINE

## 2017-09-04 PROCEDURE — 25000131 ZZH RX MED GY IP 250 OP 636 PS 637: Mod: GY | Performed by: FAMILY MEDICINE

## 2017-09-04 PROCEDURE — 25000125 ZZHC RX 250: Performed by: FAMILY MEDICINE

## 2017-09-04 PROCEDURE — 96372 THER/PROPH/DIAG INJ SC/IM: CPT

## 2017-09-04 PROCEDURE — 36415 COLL VENOUS BLD VENIPUNCTURE: CPT | Performed by: FAMILY MEDICINE

## 2017-09-04 PROCEDURE — 84132 ASSAY OF SERUM POTASSIUM: CPT | Mod: 91 | Performed by: FAMILY MEDICINE

## 2017-09-04 PROCEDURE — 96375 TX/PRO/DX INJ NEW DRUG ADDON: CPT

## 2017-09-04 PROCEDURE — 96361 HYDRATE IV INFUSION ADD-ON: CPT

## 2017-09-04 PROCEDURE — 00000146 ZZHCL STATISTIC GLUCOSE BY METER IP

## 2017-09-04 PROCEDURE — 80048 BASIC METABOLIC PNL TOTAL CA: CPT | Performed by: FAMILY MEDICINE

## 2017-09-04 PROCEDURE — 99225 ZZC SUBSEQUENT OBSERVATION CARE,LEVEL II: CPT | Performed by: FAMILY MEDICINE

## 2017-09-04 PROCEDURE — G0378 HOSPITAL OBSERVATION PER HR: HCPCS

## 2017-09-04 RX ORDER — POTASSIUM CHLORIDE 7.45 MG/ML
10 INJECTION INTRAVENOUS
Status: DISCONTINUED | OUTPATIENT
Start: 2017-09-04 | End: 2017-09-05 | Stop reason: HOSPADM

## 2017-09-04 RX ORDER — POTASSIUM CHLORIDE 29.8 MG/ML
20 INJECTION INTRAVENOUS
Status: DISCONTINUED | OUTPATIENT
Start: 2017-09-04 | End: 2017-09-04 | Stop reason: CLARIF

## 2017-09-04 RX ORDER — LISINOPRIL AND HYDROCHLOROTHIAZIDE 12.5; 2 MG/1; MG/1
1 TABLET ORAL 2 TIMES DAILY
Status: DISCONTINUED | OUTPATIENT
Start: 2017-09-04 | End: 2017-09-05 | Stop reason: HOSPADM

## 2017-09-04 RX ORDER — ONDANSETRON 4 MG/1
4 TABLET, ORALLY DISINTEGRATING ORAL EVERY 6 HOURS PRN
Status: DISCONTINUED | OUTPATIENT
Start: 2017-09-04 | End: 2017-09-05 | Stop reason: HOSPADM

## 2017-09-04 RX ORDER — DILTIAZEM HYDROCHLORIDE 120 MG/1
120 CAPSULE, EXTENDED RELEASE ORAL DAILY
Status: DISCONTINUED | OUTPATIENT
Start: 2017-09-04 | End: 2017-09-05 | Stop reason: HOSPADM

## 2017-09-04 RX ORDER — POTASSIUM CHLORIDE 1.5 G/1.58G
20-40 POWDER, FOR SOLUTION ORAL
Status: DISCONTINUED | OUTPATIENT
Start: 2017-09-04 | End: 2017-09-05 | Stop reason: HOSPADM

## 2017-09-04 RX ORDER — ACETAMINOPHEN 325 MG/1
650 TABLET ORAL EVERY 4 HOURS PRN
Status: DISCONTINUED | OUTPATIENT
Start: 2017-09-04 | End: 2017-09-05 | Stop reason: HOSPADM

## 2017-09-04 RX ORDER — PROCHLORPERAZINE MALEATE 5 MG
5 TABLET ORAL EVERY 6 HOURS PRN
Status: DISCONTINUED | OUTPATIENT
Start: 2017-09-04 | End: 2017-09-05 | Stop reason: HOSPADM

## 2017-09-04 RX ORDER — METFORMIN HCL 500 MG
500 TABLET, EXTENDED RELEASE 24 HR ORAL
Status: DISCONTINUED | OUTPATIENT
Start: 2017-09-04 | End: 2017-09-05 | Stop reason: HOSPADM

## 2017-09-04 RX ORDER — LISINOPRIL AND HYDROCHLOROTHIAZIDE 12.5; 2 MG/1; MG/1
1 TABLET ORAL DAILY
Status: DISCONTINUED | OUTPATIENT
Start: 2017-09-04 | End: 2017-09-04

## 2017-09-04 RX ORDER — METOCLOPRAMIDE 5 MG/1
5 TABLET ORAL EVERY 6 HOURS PRN
Status: DISCONTINUED | OUTPATIENT
Start: 2017-09-04 | End: 2017-09-05 | Stop reason: HOSPADM

## 2017-09-04 RX ORDER — SODIUM CHLORIDE AND POTASSIUM CHLORIDE 150; 900 MG/100ML; MG/100ML
INJECTION, SOLUTION INTRAVENOUS CONTINUOUS
Status: DISCONTINUED | OUTPATIENT
Start: 2017-09-04 | End: 2017-09-05 | Stop reason: HOSPADM

## 2017-09-04 RX ORDER — NALOXONE HYDROCHLORIDE 0.4 MG/ML
.1-.4 INJECTION, SOLUTION INTRAMUSCULAR; INTRAVENOUS; SUBCUTANEOUS
Status: DISCONTINUED | OUTPATIENT
Start: 2017-09-04 | End: 2017-09-05 | Stop reason: HOSPADM

## 2017-09-04 RX ORDER — MAGNESIUM SULFATE HEPTAHYDRATE 40 MG/ML
4 INJECTION, SOLUTION INTRAVENOUS EVERY 4 HOURS PRN
Status: DISCONTINUED | OUTPATIENT
Start: 2017-09-04 | End: 2017-09-05 | Stop reason: HOSPADM

## 2017-09-04 RX ORDER — NICOTINE POLACRILEX 4 MG
15-30 LOZENGE BUCCAL
Status: DISCONTINUED | OUTPATIENT
Start: 2017-09-04 | End: 2017-09-05 | Stop reason: HOSPADM

## 2017-09-04 RX ORDER — ALUMINA, MAGNESIA, AND SIMETHICONE 2400; 2400; 240 MG/30ML; MG/30ML; MG/30ML
30 SUSPENSION ORAL EVERY 4 HOURS PRN
Status: DISCONTINUED | OUTPATIENT
Start: 2017-09-04 | End: 2017-09-05 | Stop reason: HOSPADM

## 2017-09-04 RX ORDER — METOCLOPRAMIDE HYDROCHLORIDE 5 MG/ML
5 INJECTION INTRAMUSCULAR; INTRAVENOUS EVERY 6 HOURS PRN
Status: DISCONTINUED | OUTPATIENT
Start: 2017-09-04 | End: 2017-09-05 | Stop reason: HOSPADM

## 2017-09-04 RX ORDER — DEXTROSE MONOHYDRATE 25 G/50ML
25-50 INJECTION, SOLUTION INTRAVENOUS
Status: DISCONTINUED | OUTPATIENT
Start: 2017-09-04 | End: 2017-09-05 | Stop reason: HOSPADM

## 2017-09-04 RX ORDER — MAGNESIUM SULFATE HEPTAHYDRATE 40 MG/ML
2 INJECTION, SOLUTION INTRAVENOUS DAILY PRN
Status: DISCONTINUED | OUTPATIENT
Start: 2017-09-04 | End: 2017-09-05 | Stop reason: HOSPADM

## 2017-09-04 RX ORDER — PROCHLORPERAZINE 25 MG
12.5 SUPPOSITORY, RECTAL RECTAL EVERY 12 HOURS PRN
Status: DISCONTINUED | OUTPATIENT
Start: 2017-09-04 | End: 2017-09-05 | Stop reason: HOSPADM

## 2017-09-04 RX ORDER — POTASSIUM CHLORIDE 1500 MG/1
20-40 TABLET, EXTENDED RELEASE ORAL
Status: DISCONTINUED | OUTPATIENT
Start: 2017-09-04 | End: 2017-09-05 | Stop reason: HOSPADM

## 2017-09-04 RX ORDER — ONDANSETRON 2 MG/ML
4 INJECTION INTRAMUSCULAR; INTRAVENOUS EVERY 6 HOURS PRN
Status: DISCONTINUED | OUTPATIENT
Start: 2017-09-04 | End: 2017-09-05 | Stop reason: HOSPADM

## 2017-09-04 RX ORDER — POTASSIUM CL/LIDO/0.9 % NACL 10MEQ/0.1L
10 INTRAVENOUS SOLUTION, PIGGYBACK (ML) INTRAVENOUS
Status: DISCONTINUED | OUTPATIENT
Start: 2017-09-04 | End: 2017-09-05 | Stop reason: HOSPADM

## 2017-09-04 RX ORDER — LEVOTHYROXINE SODIUM 50 UG/1
50 TABLET ORAL DAILY
Status: DISCONTINUED | OUTPATIENT
Start: 2017-09-04 | End: 2017-09-05 | Stop reason: HOSPADM

## 2017-09-04 RX ORDER — POTASSIUM CHLORIDE 750 MG/1
10 TABLET, EXTENDED RELEASE ORAL DAILY
Status: DISCONTINUED | OUTPATIENT
Start: 2017-09-04 | End: 2017-09-05 | Stop reason: HOSPADM

## 2017-09-04 RX ADMIN — POTASSIUM CHLORIDE AND SODIUM CHLORIDE: 900; 150 INJECTION, SOLUTION INTRAVENOUS at 01:27

## 2017-09-04 RX ADMIN — MAGNESIUM SULFATE IN WATER 4 G: 40 INJECTION, SOLUTION INTRAVENOUS at 01:42

## 2017-09-04 RX ADMIN — AMITRIPTYLINE HYDROCHLORIDE 25 MG: 25 TABLET, FILM COATED ORAL at 22:34

## 2017-09-04 RX ADMIN — METOCLOPRAMIDE 5 MG: 5 INJECTION, SOLUTION INTRAMUSCULAR; INTRAVENOUS at 01:30

## 2017-09-04 RX ADMIN — LEVOTHYROXINE SODIUM 50 MCG: 50 TABLET ORAL at 06:52

## 2017-09-04 RX ADMIN — ALUMINUM HYDROXIDE, MAGNESIUM HYDROXIDE, AND DIMETHICONE 30 ML: 400; 400; 40 SUSPENSION ORAL at 18:29

## 2017-09-04 RX ADMIN — LISINOPRIL AND HYDROCHLOROTHIAZIDE 1 TABLET: 12.5; 2 TABLET ORAL at 21:06

## 2017-09-04 RX ADMIN — AMITRIPTYLINE HYDROCHLORIDE 25 MG: 25 TABLET, FILM COATED ORAL at 01:30

## 2017-09-04 RX ADMIN — POTASSIUM CHLORIDE 20 MEQ: 1500 TABLET, EXTENDED RELEASE ORAL at 04:18

## 2017-09-04 RX ADMIN — POTASSIUM CHLORIDE 10 MEQ: 750 TABLET, EXTENDED RELEASE ORAL at 08:18

## 2017-09-04 RX ADMIN — PANTOPRAZOLE SODIUM 40 MG: 40 INJECTION, POWDER, FOR SOLUTION INTRAVENOUS at 15:51

## 2017-09-04 RX ADMIN — DILTIAZEM HYDROCHLORIDE 120 MG: 120 CAPSULE, EXTENDED RELEASE ORAL at 09:46

## 2017-09-04 RX ADMIN — POTASSIUM CHLORIDE 40 MEQ: 1500 TABLET, EXTENDED RELEASE ORAL at 01:30

## 2017-09-04 RX ADMIN — METFORMIN HYDROCHLORIDE 500 MG: 500 TABLET, EXTENDED RELEASE ORAL at 19:46

## 2017-09-04 RX ADMIN — LISINOPRIL AND HYDROCHLOROTHIAZIDE 1 TABLET: 12.5; 2 TABLET ORAL at 08:18

## 2017-09-04 RX ADMIN — INSULIN ASPART 1 UNITS: 100 INJECTION, SOLUTION INTRAVENOUS; SUBCUTANEOUS at 17:52

## 2017-09-04 RX ADMIN — POTASSIUM CHLORIDE AND SODIUM CHLORIDE: 900; 150 INJECTION, SOLUTION INTRAVENOUS at 09:47

## 2017-09-04 NOTE — PROGRESS NOTES
S-(situation): Patient registered to Observation. Patient arrived to room 268 via cart from ED    B-(background): Nausea/Vomiting x 1 day    A-(assessment): Pt is alert and oriented. Mild nausea continues. Pt is weak. Potassium and  magnesium to be replaced.     R-(recommendations): Orders and observation goals reviewed with patient

## 2017-09-04 NOTE — PROGRESS NOTES
[facility] Progress Note          Assessment and Plan:   Assessment:   Principal Problem:    Nausea and vomiting in adult    Assessment: Has significantly improved with antiemetics medication and patient no longer has any nausea, but continues to have very poor appetite and symptoms of intermittent heartburn    Plan:  We'll continue observation status as patient is starting to improve and it is fully expected she will be able to discharge home tomorrow if ongoing improvement is seen. Would increase Protonix to b.i.d. dosing and provide p.r.n. Mylanta for heartburn symptoms. We'll decrease IV fluids to 50 cc an hour and monitor for increasing oral intake to ensure patient will be able to maintain hydration following discharge.  Continue to monitor closely and would consider transition to inpatient status if nausea returns or patient is unable to maintain adequate hydration.     Active Problems:    Hypokalemia    Assessment:  present initially, resolved following supplementation     Plan:  We'll recheck tomorrow morning and supplement as needed       Dehydration    Assessment:  present initially, secondary to severe nausea and vomiting the patient's oral intake continues to be poor despite improvement as above     Plan:  Decrease IV fluids and increase oral consumption, monitoring for adequate hydration       GERD (gastroesophageal reflux disease)    AssessmPatient having ongoing symptoms despite Protonix daily. CT scan shows moderate hiatal hernia, thought to be sliding type, which likely is contributing to her ongoing symptoms despite daily Protonix use.  Symptoms are always worse in the evening and overnight hours - currently taking medication in the morning.      Plan:  Will increase Protonix to BID dosing and  provide p.r.n. heartburn medication for breakthrough symptoms.  Continue to monitor for ongoing improvement.  Patient will need increase to BID dosing following discharge as well until she is  asymptomatic.        Arthritis; Primary osteoarthritis involving multiple joints    Assessment:  Celebrex for this condition, but may have contributed to patient's nausea and vomiting and worsening heartburn as above     Plan:  Continue to hold Celebrex and provide Tylenol as needed for discomfort       Essential hypertension with goal blood pressure less than 140/90    Assessment:  patient's home regimen has been reinitiated and blood pressures have improved from time of presentation but continue to be elevated     Plan:  Will increase patient's lisinopril to b.i.d. dosing and continue to monitor for stabilization and improvement. Patient is aware that her blood pressure may be slightly elevated as she discharges home, especially as lisinopril can take several days to take effect. We'll continue to monitor       Type 2 diabetes mellitus without complication, without long-term current use of insulin (H)    Assessment:  hemoglobin A1c of 6.1, on metformin at baseline but was held last evening secondary to poor oral intake. Patient's blood sugars continue to be in the low 100-140 range     Plan: We'll restart metformin, but at lower dose than patient takes as an outpatient due to ongoing for oral intake.  Continue with sliding scale coverage as needed for high blood sugars      OAB (overactive bladder)    Assessment:  not well controlled despite patient being on oxybutynin - this is noticing no significant difference in her symptoms     Plan:  Continue to hold and see if dry mouth will improve as this is mainly in her system.       Hypothyroidism, unspecified type    Assessment:  On Synthroid    Plan:  Continue without change       Insomnia, unspecified    Assessment:  Well-controlled with home regimen     Plan:  Continue without change       Dry mouth    Assessment:  Patient feels this has slightly improved today, may be secondary to rehydration as well as holding oxybutynin which has a significantly dry mouth      "Plan:  Decrease IV fluids and monitor for ongoing improvement in oral intake as above. Continue to hold oxybutynin as above.       VTE:  Observation status, ambulate  Code Status:  DNR/DNI        Interval History:   Starting to improve with no significant nausea this afternoon but patient still having very poor appetite and minimal oral intake.  Vital signs generally better - blood pressures trending better but still in the 150s systolic.  Voiding well.  Tolerating medications without significant side effects.  No new concerns today.            Significant Problems:     Past Medical History:   Diagnosis Date     Arthritis      Diabetes type 2, controlled (H)      GERD (gastroesophageal reflux disease)      History of renal calculi      HTN, goal below 140/90      Hyperlipidaemia LDL goal < 100      Hypothyroid      Insomnia      Left carotid stenosis      Migraine      Sciatica of right side 6/28/2013            Physical Exam:   Blood pressure 150/75, pulse 66, temperature 98.4  F (36.9  C), temperature source Oral, resp. rate 16, height 1.626 m (5' 4\"), weight 92.5 kg (203 lb 14.8 oz), SpO2 94 %.  Constitutional:   awake, alert, cooperative, no apparent distress, and appears stated age     Lungs:   No increased work of breathing, good air exchange, clear to auscultation bilaterally, no crackles or wheezing     Cardiovascular:   Normal apical impulse, regular rate and rhythm, normal S1 and S2, no S3 or S4, and no murmur noted     Abdomen:   Bowel sounds present, abdomen soft and non-tender     Musculoskeletal:   no lower extremity pitting edema present     Neurologic:   Awake, alert, oriented to name, place and time.       Skin:   normal skin color, texture, turgor             Data:   All laboratory and imaging data in the past 24 hours reviewed    Attestation:  I have reviewed today's vital signs, notes, medications, labs and imaging.     Electronically Signed:  Nereyda Erickson MD    Note: Chart " documentation done in part with Dragon Voice Recognition software. Although reviewed after completion, some word and grammatical errors may remain.

## 2017-09-04 NOTE — PLAN OF CARE
Problem: Goal Outcome Summary  Goal: Goal Outcome Summary  Patient was given Reglan upon admission and nausea has subsided. No emesis. She is voiding in good amounts. Pt is weak and reports mild dizziness with standing. Potassium and magnesium replaced overnight.

## 2017-09-04 NOTE — ED NOTES
Dr Leach said pt had fluids in the ambulance and he does not think pt is septic that Lactic was elevated because of diarrhea. No further intervention at this time.

## 2017-09-04 NOTE — H&P
[facility] History and Physical    Mya Hui MRN# [id number]   Age: 77 year old YOB: 1940     Date of Admission:  9/3/2017    Home clinic: [facility]  Primary care provider: Chanell Nelson          Assessment and Plan:   Assessment:   Active Problems:    Nausea and vomiting in adult    Assessment: unclear cause.  May be from food poisoning.  Could also be start of viral gastroenterities.  Finally, could be due to recent initiation of Celebrex.    Plan: will continue with IV and PO hydration.  Anti-emetics.  Hold NSAIDs at this time.    Hypokalemia    Assessment: may be secondary to #1 as well as her hydrochlorothiazide.     Plan: will replace and recheck.    Arthritis    Assessment: chronic, fair control.    Plan: tylenol.  Consider topical NSAIDs if needed.  Hold Celebrex for now.    OAB (overactive bladder)    Assessment: not controlled per pt.    Plan: will hold oxybutynin for now to see if this worsens and if dry mouth improves.    Essential hypertension with goal blood pressure less than 140/90    Assessment: not controlled at this time.    Plan: will continue her home regimen.  Consider further adjustments if not controlled.    Hypothyroidism, unspecified type    Assessment: Currently Controlled.    Plan: Continue current regimen.     Insomnia, unspecified    Assessment: Currently Controlled.    Plan: Continue current regimen.     Type 2 diabetes mellitus without complication, without long-term current use of insulin (H)    Assessment: unclear control.    Plan: check a1c.  Sliding scale insulin.  Hold metformin for now.    Primary osteoarthritis involving multiple joints    Assessment: fair pain control.    Plan: tylenol.     Dry mouth    Assessment: likely secondary to medications.    Plan: will hold most of the likely causes.  Hydrate patient.      Plan:   -Admit to hospitalist  Admit to observation  -IV fluid / PO titration  Pain management:  acetominophen  Nausea and vomiting control measures  -(See orders placed for this visit)  -Diet advanced  -Advance activity as tolerated         Admit Core Measures:  Acute MI, CHF, or stroke core measures do not apply for this admission           Chief Complaint:   Nausea and vomiting    History is obtained from the patient          History of Present Illness:   This patient is a 77 year old  female with a significant past medical history of diabetes and hypertension who presents with nausea and vomiting.    She started with sudden nausea and vomiting today around 1:30.  She was at her daughter's house.  Just started feeling poorly.  Tried walking around, then tried lying on couch, then got worse.  No clear cause.  She did eat at [employer] this morning.  Couldn't stop vomiting, family brought her in to the ER.  No diarrhea.  No abdominal pain per pt.    She had something like this about 1.5 years ago.  Was just noted to be dehydrated at that time and was better after a night in the hospital.                 Past Medical History:     Past Medical History:   Diagnosis Date     Arthritis      Diabetes type 2, controlled (H)      GERD (gastroesophageal reflux disease)      History of renal calculi      HTN, goal below 140/90      Hyperlipidaemia LDL goal < 100      Hypothyroid      Insomnia      Left carotid stenosis      Migraine      Sciatica of right side 6/28/2013             Past Surgical History:      Past Surgical History:   Procedure Laterality Date     BUNIONECTOMY      Right foot     CATARACT IOL, RT/LT Bilateral 2017     COLONOSCOPY  7/12/2013    Procedure: COLONOSCOPY;  Colonoscopy;  Surgeon: Giovany Espinoza MD;  Location: [facility]     FRACTURE TX, ANKLE RT/LT      Left ankle     HC CYSTOURETHROSCOPY W/ URETEROSCOPY &/OR PYELOSCOPY; W/ LITHOTRIPSY       HC REVISE MEDIAN N/CARPAL TUNNEL SURG      Right wrist     INJECT JOINT SACROILIAC  4/10/2014    Procedure: INJECT JOINT SACROILIAC;   Sacroiliac Joint Injection;  Surgeon: Gilbert Mcclure MD;  Location:  OR             Social History:     Social History     Social History     Marital status:      Spouse name: N/A     Number of children: N/A     Years of education: N/A     Occupational History      Retired           's office part time     Social History Main Topics     Smoking status: Never Smoker     Smokeless tobacco: Never Used     Alcohol use Yes     Drug use: No     Sexual activity: Not Currently     Other Topics Concern     Not on file     Social History Narrative             Family History:     Family History   Problem Relation Age of Onset     Hypertension Brother      CEREBROVASCULAR DISEASE Brother      DIABETES Father      Cardiovascular Father      DIABETES Brother      Borderline     Breast Cancer Mother      DIABETES Brother      Blood Disease Brother      Cardiovascular Brother      MI     Family history reviewed and updated in [facility]          Immunizations:     Immunization History   Administered Date(s) Administered     Influenza (High Dose) 3 valent vaccine 09/27/2013, 11/07/2014, 11/10/2016     Influenza (IIV3) 10/30/1996, 12/07/2010, 09/15/2011, 10/01/2012     Pneumococcal (PCV 13) 11/10/2016     Pneumococcal 23 valent 09/15/2011     TD (ADULT, 7+) 09/08/2000, 09/15/2011     TDAP Vaccine (Boostrix) 04/08/2015     Zoster vaccine, live 04/18/2012             Allergies:     Allergies   Allergen Reactions     Codeine Nausea     Fentanyl Nausea and Vomiting     VERY sensitive to most narcotics if not all.             Medications:     Current Facility-Administered Medications   Medication     ondansetron (ZOFRAN) injection 4 mg     prochlorperazine (COMPAZINE) injection 2.5 mg     Current Outpatient Prescriptions   Medication Sig     pravastatin (PRAVACHOL) 40 MG tablet TAKE ONE TABLET BY MOUTH ONCE DAILY     potassium chloride (K-TAB,KLOR-CON) 10 MEQ tablet TAKE ONE TABLET BY MOUTH ONCE DAILY     celecoxib (CELEBREX)  100 MG capsule Take 1 capsule (100 mg) by mouth 2 times daily as needed for moderate pain     aspirin 81 MG chewable tablet Take 1 tablet (81 mg) by mouth daily     levothyroxine (SYNTHROID/LEVOTHROID) 50 MCG tablet Take 1 tablet (50 mcg) by mouth daily     diltiazem 120 MG 24 hr capsule Take 1 capsule (120 mg) by mouth daily     lisinopril-hydrochlorothiazide (PRINZIDE/ZESTORETIC) 20-12.5 MG per tablet TAKE TWO TABLETS BY MOUTH EVERY MORNING & 1 TABLET AT SUPPER     metFORMIN (GLUCOPHAGE-XR) 500 MG 24 hr tablet TAKE ONE TABLET BY MOUTH EVERY MORNING AND TAKE TWO TABLETS BY MOUTH EVERY EVENING WITH MEALS     oxybutynin (DITROPAN) 5 MG tablet Take 2 tablets (10 mg) by mouth 2 times daily as needed for bladder spasms     diclofenac (VOLTAREN) 1 % GEL topical gel Apply 4 grams to knees or 2 grams to hands four times daily using enclosed dosing card.     blood glucose (NO BRAND SPECIFIED) lancets standard Glucose test strips  Use to test blood sugar 1 times daily or as directed.     blood glucose monitoring (NO BRAND SPECIFIED) test strip Use to test blood sugars 1 times daily or as directed     amitriptyline (ELAVIL) 25 MG tablet Take 1 tablet (25 mg) by mouth At Bedtime     pantoprazole (PROTONIX) 40 MG EC tablet Take 1 tablet (40 mg) by mouth daily     Multiple Vitamins-Minerals (MULTIVITAL PO) Take  by mouth. daily             Review of Systems:   C: NEGATIVE for fever, chills, change in weight  I: NEGATIVE for worrisome rashes, moles or lesions  E: NEGATIVE for vision changes or irritation  E/M: NEGATIVE for ear, mouth and throat problems  R: NEGATIVE for significant cough or SOB  B: NEGATIVE for masses, tenderness or discharge  CV: NEGATIVE for chest pain, palpitations or peripheral edema  GI: NEGATIVE for nausea, abdominal pain, heartburn, or change in bowel habits   female: incontinence-stress  MUSCULOSKELETAL:arthritis  N: NEGATIVE for weakness, dizziness or paresthesias  H: NEGATIVE for bleeding problems  P:  NEGATIVE for changes in mood or affect     Code Status:  DNR / DNI         Physical Exam:   Vitals were reviewed  Temp: 98.1  F (36.7  C) Temp src: Oral BP: 174/86   Heart Rate: 65 Resp: 12 SpO2: 97 % O2 Device: Nasal cannula Oxygen Delivery: 2 LPM    Constitutional:   awake, alert, cooperative, no apparent distress, and appears stated age     Eyes:   Lids and lashes normal, pupils equal, round and reactive to light, extra ocular muscles intact, sclera clear, conjunctiva normal     ENT:   Normocephalic, without obvious abnormality, atraumatic, sinuses nontender on palpation, external ears without lesions, oral pharynx with moist mucous membranes, tonsils without erythema or exudates, gums normal and good dentition.     Neck:   Supple, symmetrical, trachea midline, no adenopathy, thyroid symmetric, not enlarged and no tenderness, skin normal     Lungs:   No increased work of breathing, good air exchange, clear to auscultation bilaterally, no crackles or wheezing     Cardiovascular:   Normal apical impulse, regular rate and rhythm, normal S1 and S2, no S3 or S4, and no murmur noted     Abdomen:   No scars, normal bowel sounds, soft, non-distended, mild epigastric tenderness, no masses palpated, no hepatosplenomegally     Musculoskeletal:   no lower extremity pitting edema present  full range of motion noted  tone is normal     Skin:   no bruising or bleeding and normal skin color, texture, turgor                                 Data:     Results for orders placed or performed during the hospital encounter of 09/03/17 (from the past 24 hour(s))   UA reflex to Microscopic and Culture   Result Value Ref Range    Color Urine Straw     Appearance Urine Clear     Glucose Urine 50 (A) NEG^Negative mg/dL    Bilirubin Urine Negative NEG^Negative    Ketones Urine Negative NEG^Negative mg/dL    Specific Gravity Urine 1.016 1.003 - 1.035    Blood Urine Negative NEG^Negative    pH Urine 6.0 5.0 - 7.0 pH    Protein Albumin Urine  Negative NEG^Negative mg/dL    Urobilinogen mg/dL 0.0 0.0 - 2.0 mg/dL    Nitrite Urine Negative NEG^Negative    Leukocyte Esterase Urine Negative NEG^Negative    Source Unspecified Urine    Lactic acid whole blood   Result Value Ref Range    Lactic Acid 2.4 (H) 0.7 - 2.0 mmol/L   Comprehensive metabolic panel   Result Value Ref Range    Sodium 141 133 - 144 mmol/L    Potassium 3.3 (L) 3.4 - 5.3 mmol/L    Chloride 103 94 - 109 mmol/L    Carbon Dioxide 27 20 - 32 mmol/L    Anion Gap 11 3 - 14 mmol/L    Glucose 129 (H) 70 - 99 mg/dL    Urea Nitrogen 29 7 - 30 mg/dL    Creatinine 0.84 0.52 - 1.04 mg/dL    GFR Estimate 66 >60 mL/min/1.7m2    GFR Estimate If Black 80 >60 mL/min/1.7m2    Calcium 8.6 8.5 - 10.1 mg/dL    Bilirubin Total 0.4 0.2 - 1.3 mg/dL    Albumin 3.4 3.4 - 5.0 g/dL    Protein Total 6.7 (L) 6.8 - 8.8 g/dL    Alkaline Phosphatase 48 40 - 150 U/L    ALT 29 0 - 50 U/L    AST 17 0 - 45 U/L   Lipase   Result Value Ref Range    Lipase 156 73 - 393 U/L   CBC with platelets differential   Result Value Ref Range    WBC 8.2 4.0 - 11.0 10e9/L    RBC Count 4.08 3.8 - 5.2 10e12/L    Hemoglobin 10.9 (L) 11.7 - 15.7 g/dL    Hematocrit 35.1 35.0 - 47.0 %    MCV 86 78 - 100 fl    MCH 26.7 26.5 - 33.0 pg    MCHC 31.1 (L) 31.5 - 36.5 g/dL    RDW 13.5 10.0 - 15.0 %    Platelet Count 228 150 - 450 10e9/L    Diff Method Automated Method     % Neutrophils 73.7 %    % Lymphocytes 16.6 %    % Monocytes 8.4 %    % Eosinophils 0.9 %    % Basophils 0.2 %    % Immature Granulocytes 0.2 %    Absolute Neutrophil 6.0 1.6 - 8.3 10e9/L    Absolute Lymphocytes 1.4 0.8 - 5.3 10e9/L    Absolute Monocytes 0.7 0.0 - 1.3 10e9/L    Absolute Eosinophils 0.1 0.0 - 0.7 10e9/L    Absolute Basophils 0.0 0.0 - 0.2 10e9/L    Abs Immature Granulocytes 0.0 0 - 0.4 10e9/L   Troponin I   Result Value Ref Range    Troponin I ES 0.030 0.000 - 0.045 ug/L   Blood gas venous   Result Value Ref Range    Ph Venous 7.37 7.32 - 7.43 pH    PCO2 Venous 50 40 - 50 mm  Hg    PO2 Venous 30 25 - 47 mm Hg    Bicarbonate Venous 29 (H) 21 - 28 mmol/L    Base Excess Venous 2.5 mmol/L    FIO2 21    XR Chest Port 1 View    Narrative    XR CHEST PORT 1 VW  9/3/2017 9:16 PM     HISTORY:  SOB, Weakness    COMPARISON: Film dated 4/8/2015    FINDINGS:  The heart is at the upper limits of normal in size.  Pulmonary vasculature is normal. No active infiltrates are identified.      Impression    IMPRESSION: No active disease identified. No significant change since  6/7/2000 in the left    CONNIE SAENZ MD   CT Abdomen Pelvis w/o Contrast    Narrative    CT ABDOMEN AND PELVIS WITHOUT CONTRAST 9/3/2017 10:08 PM     HISTORY: Abdominal pain, nausea and vomiting intractable.    COMPARISON: None.    TECHNIQUE: Axial CT images of the abdomen and pelvis from the  diaphragm to the symphysis pubis were acquired without IV contrast.  Radiation dose for this scan was reduced using automated exposure  control, adjustment of the mA and/or kV according to patient size, or  iterative reconstruction technique.    FINDINGS: There are no stones seen within either kidney, either  ureter, or the bladder. There is no hydroureter or hydronephrosis.  There is no perinephric fat stranding. Kidneys are normal in size and  configuration.  There is cholelithiasis without evidence for  cholecystitis. Moderate hiatal hernia appears a sliding-type. There  are extensive atherosclerotic changes of the visualized aorta and its  branches. There is no evidence of aortic aneurysm. There are no  dilated loops of small intestine or large bowel to suggest ileus or  obstruction. No free air or free fluid. No diverticulitis.  No  splenomegaly.  No definite adrenal nodules.  Pancreas grossly  unremarkable. The remainder of the visualized abdomen is unremarkable  on this noncontrast scan. Survey of the visualized bony structures  demonstrates no destructive bony lesions.   The visualized lung bases  are unremarkable.       Impression     IMPRESSION:   1. No renal, ureteral, or bladder stones.  2. Moderate-sized hiatal hernia, there does not appear to be a  paraesophageal hernia, this appears likely sliding-type.  3. Cholelithiasis without cholecystitis.  4. No bowel obstruction.    JORGE GARCIA MD   Lactic acid whole blood   Result Value Ref Range    Lactic Acid 1.6 0.7 - 2.0 mmol/L      All cardiac studies reviewed by me.   All imaging studies reviewed by me.      Attestation:  I have reviewed today's vital signs, notes, medications, labs and imaging.  Amount of time performed on this history and physical: 50 minutes. Giovany Purvis MD, MD

## 2017-09-04 NOTE — ED NOTES
ED Nursing criteria listed below was addressed during verbal handoff:     Abnormal vitals: Yes  Abnormal results: Yes  Med Reconciliation completed: No - In Progress - Receiving RN Notified  Meds given in ED: Yes  Any Overdue Meds: Yes  Core Measures: No  Isolation: No  Special needs: Yes  Skin assessment: Yes    Observation Patient  Education provided: Yes

## 2017-09-04 NOTE — ED NOTES
Patient was incontinent when I did her EKG. I cleaned patient up and put clean sheets on her bed also placed patient into a brief for more comfort

## 2017-09-04 NOTE — ED PROVIDER NOTES
"  History     Chief Complaint   Patient presents with     Nausea & Vomiting     The history is provided by the patient and a relative.     Mya Hui is a 77 year old female who presents to the ED with family for evaluation of nausea and vomiting. Patient's daughter states that she started feeling sick starting around 1200 this afternoon. She states she ate at [employer] this AM and is concerned about possible food related illness. She states she felt well prior to noon today. About 1330, she started vomiting and has continued to vomit multiple times throughout the day. Her son-in-law states that when they picked her up today she was \"ice cold\" but was not diaphoretic. The only new medication started in the last month was Celebrex.  Denies hematochezia, cough, fever, back pain, chest pain, shortness of breath, dysuria, frequency, urgency, hematuria or any other associated symptoms. The patient received a liter of IV fluid prior to arrival to the ER per EMS.       I have reviewed the Medications, Allergies, Past Medical and Surgical History, and Social History in the Epic system.    Allergies:   Allergies   Allergen Reactions     Codeine Nausea     Fentanyl Nausea and Vomiting     VERY sensitive to most narcotics if not all.         No current facility-administered medications on file prior to encounter.   Current Outpatient Prescriptions on File Prior to Encounter:  pravastatin (PRAVACHOL) 40 MG tablet TAKE ONE TABLET BY MOUTH ONCE DAILY   potassium chloride (K-TAB,KLOR-CON) 10 MEQ tablet TAKE ONE TABLET BY MOUTH ONCE DAILY   celecoxib (CELEBREX) 100 MG capsule Take 1 capsule (100 mg) by mouth 2 times daily as needed for moderate pain   aspirin 81 MG chewable tablet Take 1 tablet (81 mg) by mouth daily   levothyroxine (SYNTHROID/LEVOTHROID) 50 MCG tablet Take 1 tablet (50 mcg) by mouth daily   diltiazem 120 MG 24 hr capsule Take 1 capsule (120 mg) by mouth daily   lisinopril-hydrochlorothiazide " (PRINZIDE/ZESTORETIC) 20-12.5 MG per tablet TAKE TWO TABLETS BY MOUTH EVERY MORNING & 1 TABLET AT SUPPER   metFORMIN (GLUCOPHAGE-XR) 500 MG 24 hr tablet TAKE ONE TABLET BY MOUTH EVERY MORNING AND TAKE TWO TABLETS BY MOUTH EVERY EVENING WITH MEALS   oxybutynin (DITROPAN) 5 MG tablet Take 2 tablets (10 mg) by mouth 2 times daily as needed for bladder spasms   diclofenac (VOLTAREN) 1 % GEL topical gel Apply 4 grams to knees or 2 grams to hands four times daily using enclosed dosing card.   blood glucose (NO BRAND SPECIFIED) lancets standard Glucose test stripsUse to test blood sugar 1 times daily or as directed.   blood glucose monitoring (NO BRAND SPECIFIED) test strip Use to test blood sugars 1 times daily or as directed   amitriptyline (ELAVIL) 25 MG tablet Take 1 tablet (25 mg) by mouth At Bedtime   pantoprazole (PROTONIX) 40 MG EC tablet Take 1 tablet (40 mg) by mouth daily   Multiple Vitamins-Minerals (MULTIVITAL PO) Take  by mouth. daily       Patient Active Problem List   Diagnosis     Advance Care Planning     Arthritis     Nevus     Asymptomatic carotid artery stenosis     Hyperlipidemia with target LDL less than 100     GERD (gastroesophageal reflux disease)     History of renal calculi     Sciatica of right side     Hip pain     Hearing aid worn     Osteoarthritis of hip     DDD (degenerative disc disease), lumbar     OAB (overactive bladder)     Essential hypertension with goal blood pressure less than 140/90     Hypothyroidism, unspecified type     Insomnia, unspecified     Type 2 diabetes mellitus without complication, without long-term current use of insulin (H)     Cataract, bilateral     Primary osteoarthritis involving multiple joints     Chronic right shoulder pain     Right shoulder pain, unspecified chronicity     Dry mouth     Rotator cuff arthropathy, right     Nausea and vomiting in adult     Hypokalemia     Dehydration       Past Surgical History:   Procedure Laterality Date     BUNIONECTOMY    "   Right foot     CATARACT IOL, RT/LT Bilateral 2017     COLONOSCOPY  7/12/2013    Procedure: COLONOSCOPY;  Colonoscopy;  Surgeon: Giovany Espinoza MD;  Location: [facility]     FRACTURE TX, ANKLE RT/LT      Left ankle     HC CYSTOURETHROSCOPY W/ URETEROSCOPY &/OR PYELOSCOPY; W/ LITHOTRIPSY       HC REVISE MEDIAN N/CARPAL TUNNEL SURG      Right wrist     INJECT JOINT SACROILIAC  4/10/2014    Procedure: INJECT JOINT SACROILIAC;  Sacroiliac Joint Injection;  Surgeon: Gilbert Mcclure MD;  Location: [facility]       Social History   Substance Use Topics     Smoking status: Never Smoker     Smokeless tobacco: Never Used     Alcohol use Yes       Most Recent Immunizations   Administered Date(s) Administered     Influenza (High Dose) 3 valent vaccine 11/10/2016     Influenza (IIV3) 10/01/2012     Pneumococcal (PCV 13) 11/10/2016     Pneumococcal 23 valent 09/15/2011     TD (ADULT, 7+) 09/15/2011     TDAP Vaccine (Boostrix) 04/08/2015     Zoster vaccine, live 04/18/2012       BMI: Estimated body mass index is 35 kg/(m^2) as calculated from the following:    Height as of this encounter: 1.626 m (5' 4\").    Weight as of this encounter: 92.5 kg (203 lb 14.8 oz).      Review of Systems   Constitutional: Positive for chills and fatigue. Negative for diaphoresis and fever.   Respiratory: Negative for cough and shortness of breath.    Cardiovascular: Negative for chest pain.   Gastrointestinal: Positive for abdominal pain, nausea and vomiting. Negative for blood in stool, constipation and diarrhea.   Genitourinary: Negative for dysuria, frequency, hematuria and urgency.   Musculoskeletal: Negative for back pain.   Skin: Negative for rash.   Neurological: Negative for dizziness, weakness, light-headedness, numbness and headaches.   All other systems reviewed and are negative.      Physical Exam   BP: (!) 168/94  Heart Rate: 80  Resp: 12  SpO2: 91 %  Physical Exam   Constitutional: She is oriented to person, place, and time. She appears " ill.   Female lying in bed with eyes closed. She is alert and answers questions appropriately. Appears in mild distress secondary to nausea. She is cool to the touch. Oral temp is 98.3.    HENT:   Head: Normocephalic and atraumatic.   Dry oral mucosa     Eyes: Conjunctivae are normal. Pupils are equal, round, and reactive to light.   Neck: Normal range of motion. Neck supple.   Cardiovascular: Normal rate, regular rhythm and intact distal pulses.  Exam reveals no gallop and no friction rub.    Murmur (grade 1) heard.  Pulmonary/Chest: Effort normal. No respiratory distress.   Abdominal: Soft. She exhibits no mass. Bowel sounds are increased. There is generalized tenderness (mild genrealized tenderness with palpation throughout the abdomen.). There is no rigidity, no rebound, no guarding, no tenderness at McBurney's point and negative Galicia's sign. No hernia.   Obese     Musculoskeletal: Normal range of motion.   Lymphadenopathy:     She has no cervical adenopathy.   Neurological: She is alert and oriented to person, place, and time. She has normal strength.   Skin: Skin is warm, dry and intact. No rash noted. She is not diaphoretic. No cyanosis. No pallor. Nails show no clubbing.   Psychiatric: She has a normal mood and affect. Her speech is normal and behavior is normal. Thought content normal. Cognition and memory are normal.   Nursing note and vitals reviewed.      ED Course     ED Course     Procedures             EKG Interpretation:      Interpreted by Naman Leach  Time reviewed: 21:39  Symptoms at time of EKG: Nausea, fatigue   Rhythm: sinus bradycardia  Rate: 50-60  Axis: Normal  Ectopy: none  Conduction: normal  ST Segments/ T Waves: Non-specific ST-T wave changes  Q Waves: none  Comparison to prior: Unchanged from 2011    Clinical Impression: no acute changes    Critical Care time:  none             Results for orders placed or performed during the hospital encounter of 09/03/17 (from the past  24 hour(s))   UA reflex to Microscopic and Culture   Result Value Ref Range    Color Urine Straw     Appearance Urine Clear     Glucose Urine 50 (A) NEG^Negative mg/dL    Bilirubin Urine Negative NEG^Negative    Ketones Urine Negative NEG^Negative mg/dL    Specific Gravity Urine 1.016 1.003 - 1.035    Blood Urine Negative NEG^Negative    pH Urine 6.0 5.0 - 7.0 pH    Protein Albumin Urine Negative NEG^Negative mg/dL    Urobilinogen mg/dL 0.0 0.0 - 2.0 mg/dL    Nitrite Urine Negative NEG^Negative    Leukocyte Esterase Urine Negative NEG^Negative    Source Unspecified Urine    Lactic acid whole blood   Result Value Ref Range    Lactic Acid 2.4 (H) 0.7 - 2.0 mmol/L   Comprehensive metabolic panel   Result Value Ref Range    Sodium 141 133 - 144 mmol/L    Potassium 3.3 (L) 3.4 - 5.3 mmol/L    Chloride 103 94 - 109 mmol/L    Carbon Dioxide 27 20 - 32 mmol/L    Anion Gap 11 3 - 14 mmol/L    Glucose 129 (H) 70 - 99 mg/dL    Urea Nitrogen 29 7 - 30 mg/dL    Creatinine 0.84 0.52 - 1.04 mg/dL    GFR Estimate 66 >60 mL/min/1.7m2    GFR Estimate If Black 80 >60 mL/min/1.7m2    Calcium 8.6 8.5 - 10.1 mg/dL    Bilirubin Total 0.4 0.2 - 1.3 mg/dL    Albumin 3.4 3.4 - 5.0 g/dL    Protein Total 6.7 (L) 6.8 - 8.8 g/dL    Alkaline Phosphatase 48 40 - 150 U/L    ALT 29 0 - 50 U/L    AST 17 0 - 45 U/L   Lipase   Result Value Ref Range    Lipase 156 73 - 393 U/L   CBC with platelets differential   Result Value Ref Range    WBC 8.2 4.0 - 11.0 10e9/L    RBC Count 4.08 3.8 - 5.2 10e12/L    Hemoglobin 10.9 (L) 11.7 - 15.7 g/dL    Hematocrit 35.1 35.0 - 47.0 %    MCV 86 78 - 100 fl    MCH 26.7 26.5 - 33.0 pg    MCHC 31.1 (L) 31.5 - 36.5 g/dL    RDW 13.5 10.0 - 15.0 %    Platelet Count 228 150 - 450 10e9/L    Diff Method Automated Method     % Neutrophils 73.7 %    % Lymphocytes 16.6 %    % Monocytes 8.4 %    % Eosinophils 0.9 %    % Basophils 0.2 %    % Immature Granulocytes 0.2 %    Absolute Neutrophil 6.0 1.6 - 8.3 10e9/L    Absolute  Lymphocytes 1.4 0.8 - 5.3 10e9/L    Absolute Monocytes 0.7 0.0 - 1.3 10e9/L    Absolute Eosinophils 0.1 0.0 - 0.7 10e9/L    Absolute Basophils 0.0 0.0 - 0.2 10e9/L    Abs Immature Granulocytes 0.0 0 - 0.4 10e9/L   Troponin I   Result Value Ref Range    Troponin I ES 0.030 0.000 - 0.045 ug/L   Blood gas venous   Result Value Ref Range    Ph Venous 7.37 7.32 - 7.43 pH    PCO2 Venous 50 40 - 50 mm Hg    PO2 Venous 30 25 - 47 mm Hg    Bicarbonate Venous 29 (H) 21 - 28 mmol/L    Base Excess Venous 2.5 mmol/L    FIO2 21    XR Chest Port 1 View    Narrative    XR CHEST PORT 1 VW  9/3/2017 9:16 PM     HISTORY:  SOB, Weakness    COMPARISON: Film dated 4/8/2015    FINDINGS:  The heart is at the upper limits of normal in size.  Pulmonary vasculature is normal. No active infiltrates are identified.      Impression    IMPRESSION: No active disease identified. No significant change since  6/7/2000 in the left    CONNIE SAENZ MD   CT Abdomen Pelvis w/o Contrast    Narrative    CT ABDOMEN AND PELVIS WITHOUT CONTRAST 9/3/2017 10:08 PM     HISTORY: Abdominal pain, nausea and vomiting intractable.    COMPARISON: None.    TECHNIQUE: Axial CT images of the abdomen and pelvis from the  diaphragm to the symphysis pubis were acquired without IV contrast.  Radiation dose for this scan was reduced using automated exposure  control, adjustment of the mA and/or kV according to patient size, or  iterative reconstruction technique.    FINDINGS: There are no stones seen within either kidney, either  ureter, or the bladder. There is no hydroureter or hydronephrosis.  There is no perinephric fat stranding. Kidneys are normal in size and  configuration.  There is cholelithiasis without evidence for  cholecystitis. Moderate hiatal hernia appears a sliding-type. There  are extensive atherosclerotic changes of the visualized aorta and its  branches. There is no evidence of aortic aneurysm. There are no  dilated loops of small intestine or large bowel to  suggest ileus or  obstruction. No free air or free fluid. No diverticulitis.  No  splenomegaly.  No definite adrenal nodules.  Pancreas grossly  unremarkable. The remainder of the visualized abdomen is unremarkable  on this noncontrast scan. Survey of the visualized bony structures  demonstrates no destructive bony lesions.   The visualized lung bases  are unremarkable.       Impression    IMPRESSION:   1. No renal, ureteral, or bladder stones.  2. Moderate-sized hiatal hernia, there does not appear to be a  paraesophageal hernia, this appears likely sliding-type.  3. Cholelithiasis without cholecystitis.  4. No bowel obstruction.    JORGE GARCIA MD   Lactic acid whole blood   Result Value Ref Range    Lactic Acid 1.6 0.7 - 2.0 mmol/L   Magnesium   Result Value Ref Range    Magnesium 1.5 (L) 1.6 - 2.3 mg/dL   Phosphorus   Result Value Ref Range    Phosphorus 3.3 2.5 - 4.5 mg/dL   Glucose by meter   Result Value Ref Range    Glucose 131 (H) 70 - 99 mg/dL         Medications   0.9% sodium chloride infusion ( Intravenous Stopped 9/3/17 2256)   amitriptyline (ELAVIL) tablet 25 mg (25 mg Oral Given 9/4/17 0130)   levothyroxine (SYNTHROID/LEVOTHROID) tablet 50 mcg (not administered)   potassium chloride SA (K-DUR/KLOR-CON M) CR tablet 10 mEq (not administered)   lisinopril-hydrochlorothiazide (PRINZIDE/ZESTORETIC) 20-12.5 MG per tablet 1 tablet (not administered)   glucose 40 % gel 15-30 g (not administered)     Or   dextrose 50 % injection 25-50 mL (not administered)     Or   glucagon injection 1 mg (not administered)   naloxone (NARCAN) injection 0.1-0.4 mg (not administered)   insulin aspart (NovoLOG) inj (RAPID ACTING) (not administered)   insulin aspart (NovoLOG) inj (RAPID ACTING) (1 Units Subcutaneous Not Given 9/4/17 0116)   0.9% sodium chloride + KCl 20 mEq/L infusion ( Intravenous New Bag 9/4/17 0127)   acetaminophen (TYLENOL) tablet 650 mg (not administered)   ondansetron (ZOFRAN-ODT) ODT tab 4 mg (not  administered)     Or   ondansetron (ZOFRAN) injection 4 mg (not administered)   prochlorperazine (COMPAZINE) injection 5 mg (not administered)     Or   prochlorperazine (COMPAZINE) tablet 5 mg (not administered)     Or   prochlorperazine (COMPAZINE) Suppository 12.5 mg (not administered)   metoclopramide (REGLAN) tablet 5 mg ( Oral See Alternative 9/4/17 0130)     Or   metoclopramide (REGLAN) injection 5 mg (5 mg Intravenous Given 9/4/17 0130)   potassium chloride SA (K-DUR/KLOR-CON M) CR tablet 20-40 mEq (20 mEq Oral Given 9/4/17 0418)   potassium chloride (KLOR-CON) Packet 20-40 mEq (not administered)   potassium chloride 10 mEq in 100 mL intermittent infusion (not administered)   potassium chloride 10 mEq in 100 mL intermittent infusion with 10 mg lidocaine (not administered)   magnesium sulfate 2 g in water intermittent infusion (not administered)   magnesium sulfate 4 g in 100 mL sterile water (premade) (4 g Intravenous New Bag 9/4/17 0142)   potassium phosphate 15 mmol in D5W 250 mL intermittent infusion (not administered)   potassium phosphate 20 mmol in D5W 500 mL intermittent infusion (not administered)   potassium phosphate 25 mmol in D5W 500 mL intermittent infusion (not administered)       Assessments & Plan (with Medical Decision Making)  9:10 PM  Checked and found to have some hypoxia with sats down into the upper 80s on room air.  She complains of feeling fatigued but denies any pain symptoms at this time.  Oxygen was ordered via nasal cannula.  We'll check EKG, troponin, venous blood gas, and collect blood cultures as her lactic acid level was elevated at 2.4.  Suspect this is secondary to dehydration secondary to the intractable N/V symptoms since 13:00 hrs today. No obvious source for an infection is identified.  9:32 PM patient rechecked and states that she is feeling much improved since sitting more upright and her nausea has nearly resolved.  She thinks that the IV fluids have been helpful for  her.  I discussed the lab exam findings to this point.  She was notified that the chest x-ray looked normal.  Patient's family states that the patient had a very similar collection of symptoms a few years ago and was seen at the [place], [place] emergency room.  The patient states that she was given IV fluids and eventually felt improved and was discharged from the ER to home at that time.    Ct scan with findings consistent with a hiatal hernia. I suspect that it is sliding as she feels improved with in the upright position vs. Worsening symptoms in the supine position. I suspect the recent addition of the Celebrex for her arthritis condition may be contributing to the increased symptoms today. Still no obvious findings for sepsis other than the elevated lactate acid. Will recheck that level now after the IV fluid given.  Repeat lactic acid level is now normal with a level of 1.6.  She still has symptoms of nausea but no additional vomiting episodes.  I recommended the patient be placed in an observation bed for additional period of IV fluid hydration and antiemetic use to control her symptoms.  I spoke with Segun Purvis, hospitalist.  He is willing to come to the ER to evaluate the patient for placement in an observation bed and agrees to take over the care of the patient upon arrival to the hospital floor.       I have reviewed the nursing notes.    I have reviewed the findings, diagnosis, plan and need for follow up with the patient.       Final diagnoses:   Intractable vomiting with nausea, unspecified vomiting type   Hiatal hernia   Gastritis due to nonsteroidal anti-inflammatory drug (NSAID), accidental or unintentional, initial encounter     This document serves as a record of services personally performed by Naman Leach MD. It was created on their behalf by Lisandra West, a trained medical scribe. The creation of this record is based on the provider's personal observations and the statements  of the patient. This document has been checked and approved by the attending provider.    Note: Chart documentation done in part with Dragon Voice Recognition software. Although reviewed after completion, some word and grammatical errors may remain.      9/3/2017   [facility] EMERGENCY DEPARTMENT     Naman Leach,   09/04/17 0453

## 2017-09-05 VITALS
HEART RATE: 56 BPM | SYSTOLIC BLOOD PRESSURE: 153 MMHG | WEIGHT: 203.93 LBS | TEMPERATURE: 97.2 F | DIASTOLIC BLOOD PRESSURE: 83 MMHG | OXYGEN SATURATION: 94 % | RESPIRATION RATE: 20 BRPM | HEIGHT: 64 IN | BODY MASS INDEX: 34.82 KG/M2

## 2017-09-05 LAB
ANION GAP SERPL CALCULATED.3IONS-SCNC: 9 MMOL/L (ref 3–14)
BUN SERPL-MCNC: 17 MG/DL (ref 7–30)
CALCIUM SERPL-MCNC: 8.5 MG/DL (ref 8.5–10.1)
CHLORIDE SERPL-SCNC: 110 MMOL/L (ref 94–109)
CO2 SERPL-SCNC: 27 MMOL/L (ref 20–32)
CREAT SERPL-MCNC: 0.81 MG/DL (ref 0.52–1.04)
GFR SERPL CREATININE-BSD FRML MDRD: 69 ML/MIN/1.7M2
GLUCOSE BLDC GLUCOMTR-MCNC: 111 MG/DL (ref 70–99)
GLUCOSE BLDC GLUCOMTR-MCNC: 121 MG/DL (ref 70–99)
GLUCOSE SERPL-MCNC: 115 MG/DL (ref 70–99)
MAGNESIUM SERPL-MCNC: 2.2 MG/DL (ref 1.6–2.3)
POTASSIUM SERPL-SCNC: 4.4 MMOL/L (ref 3.4–5.3)
SODIUM SERPL-SCNC: 146 MMOL/L (ref 133–144)

## 2017-09-05 PROCEDURE — A9270 NON-COVERED ITEM OR SERVICE: HCPCS | Mod: GY | Performed by: INTERNAL MEDICINE

## 2017-09-05 PROCEDURE — 25000132 ZZH RX MED GY IP 250 OP 250 PS 637: Mod: GY | Performed by: INTERNAL MEDICINE

## 2017-09-05 PROCEDURE — 25000132 ZZH RX MED GY IP 250 OP 250 PS 637: Mod: GY | Performed by: FAMILY MEDICINE

## 2017-09-05 PROCEDURE — 00000146 ZZHCL STATISTIC GLUCOSE BY METER IP

## 2017-09-05 PROCEDURE — 80048 BASIC METABOLIC PNL TOTAL CA: CPT | Performed by: FAMILY MEDICINE

## 2017-09-05 PROCEDURE — 99217 ZZC OBSERVATION CARE DISCHARGE: CPT | Performed by: FAMILY MEDICINE

## 2017-09-05 PROCEDURE — 83735 ASSAY OF MAGNESIUM: CPT | Performed by: FAMILY MEDICINE

## 2017-09-05 PROCEDURE — A9270 NON-COVERED ITEM OR SERVICE: HCPCS | Mod: GY | Performed by: FAMILY MEDICINE

## 2017-09-05 PROCEDURE — 36415 COLL VENOUS BLD VENIPUNCTURE: CPT | Performed by: FAMILY MEDICINE

## 2017-09-05 PROCEDURE — G0378 HOSPITAL OBSERVATION PER HR: HCPCS

## 2017-09-05 RX ORDER — LISINOPRIL AND HYDROCHLOROTHIAZIDE 12.5; 2 MG/1; MG/1
1 TABLET ORAL 2 TIMES DAILY
Qty: 60 TABLET | Refills: 0 | Status: ON HOLD | OUTPATIENT
Start: 2017-09-05 | End: 2017-09-11

## 2017-09-05 RX ORDER — PANTOPRAZOLE SODIUM 40 MG/1
40 TABLET, DELAYED RELEASE ORAL
Qty: 60 TABLET | Refills: 0 | Status: ON HOLD | OUTPATIENT
Start: 2017-09-05 | End: 2017-09-10

## 2017-09-05 RX ORDER — ALUMINA, MAGNESIA, AND SIMETHICONE 2400; 2400; 240 MG/30ML; MG/30ML; MG/30ML
30 SUSPENSION ORAL EVERY 4 HOURS PRN
Refills: 0 | COMMUNITY
Start: 2017-09-05 | End: 2017-09-16

## 2017-09-05 RX ORDER — ACETAMINOPHEN 325 MG/1
1000 TABLET ORAL EVERY 4 HOURS PRN
Qty: 100 TABLET | COMMUNITY
Start: 2017-09-05 | End: 2020-01-20

## 2017-09-05 RX ORDER — METFORMIN HCL 500 MG
TABLET, EXTENDED RELEASE 24 HR ORAL
Qty: 270 TABLET | Refills: 2 | Status: ON HOLD | COMMUNITY
Start: 2017-09-05 | End: 2017-09-11

## 2017-09-05 RX ORDER — PANTOPRAZOLE SODIUM 40 MG/1
40 TABLET, DELAYED RELEASE ORAL
Status: DISCONTINUED | OUTPATIENT
Start: 2017-09-05 | End: 2017-09-05 | Stop reason: HOSPADM

## 2017-09-05 RX ADMIN — LISINOPRIL AND HYDROCHLOROTHIAZIDE 1 TABLET: 12.5; 2 TABLET ORAL at 08:55

## 2017-09-05 RX ADMIN — PANTOPRAZOLE SODIUM 40 MG: 40 TABLET, DELAYED RELEASE ORAL at 06:53

## 2017-09-05 RX ADMIN — POTASSIUM CHLORIDE 10 MEQ: 750 TABLET, EXTENDED RELEASE ORAL at 08:55

## 2017-09-05 RX ADMIN — LEVOTHYROXINE SODIUM 50 MCG: 50 TABLET ORAL at 06:53

## 2017-09-05 RX ADMIN — DILTIAZEM HYDROCHLORIDE 120 MG: 120 CAPSULE, EXTENDED RELEASE ORAL at 08:55

## 2017-09-05 NOTE — PLAN OF CARE
Problem: Goal Outcome Summary  Goal: Goal Outcome Summary  Patient has not had any nausea or vomiting throughout the night. She reports feeling better with strength improving. No complaints of pain. Vitals stable.

## 2017-09-05 NOTE — PLAN OF CARE
Problem: Goal Outcome Summary  Goal: Goal Outcome Summary  Outcome: No Change  Patients vitals stable on room air. Alert and oriented x4. Patients IV infiltrated; patient refuses new IV access, MD aware. Patients  and 94. Patient denies pain, nausea or vomiting. Patient has been ambulating stand by assist to the bathroom multiple times tonight.

## 2017-09-05 NOTE — PROGRESS NOTES
S: Patient discharged to home via w/c with daughter    B: Observation goals met     A: Denies nausea, no vomiting. VSS, tolerating diet, ambulating in room, return of baseline function status.    R: Discharge instructions reviewed with patient and patient's daughter. Listed belongings gathered and returned to patient.  Patient Education resolved: Yes  New medications-Pt. Has been educated about reason of use and side effects Yes  Home and hospital acquired medications returned to patient NA  Medication Bin checked and emptied on discharge Yes

## 2017-09-05 NOTE — DISCHARGE SUMMARY
[facility] Discharge Summary    Mya Hui MRN# [id number]   Age: 77 year old YOB: 1940     Date of Admission:  9/3/2017  Date of Discharge::  9/5/2017  Admitting Physician:  Giovany Purvis MD  Discharge Physician:  Nereyda Erickson MD    Home clinic: [facility]          Admission Diagnoses:   Hiatal hernia [K44.9]  Gastritis due to nonsteroidal anti-inflammatory drug (NSAID), accidental or unintentional, initial encounter [T39.391A]  Intractable vomiting with nausea, unspecified vomiting type [R11.2]          Discharge Diagnosis:   Principal Problem:    Nausea and vomiting in adult, concern for gastritis    Assessment: Etiology unclear, however patient had recently started Celebrex and was found to have a significant hiatal hernia with uncontrolled reflux symptoms which likely is contributing to her initial presentation. There was also considered for possible viral enteritis though no fever or other signs of infection were present. Patient has improved with supportive cares and discontinuation of Celebrex as well as b.i.d. dosing of Protonix.    Plan:  Patient will be discharged with ongoing b.i.d. Protonix as well as p.r.n. Tums or Mylanta. She will continue to hold Celebrex at time of discharge and will follow up with her primary care provider to discuss treatment of arthritis going forward    Active Problems:    Hypokalemia    Assessment: Secondary to nausea, resolved following supplementation    Plan:  No further supplementation is needed at time of discharge      Dehydration    Assessment:  Secondary to nausea and vomiting, resolved with patient tolerating oral intake without difficulty    Plan:  No further treatment needed at time of discharge      Arthritis; Primary osteoarthritis involving multiple joints    Assessment:  Ongoing, with patient recently started on Celebrex for symptoms, which may have contributed to GI symptoms as above    Plan:   Patient will discontinue Celebrex and return to Tylenol which works fairly well for her symptoms. She will follow-up with her primary care provider to discuss other options going forward      GERD (gastroesophageal reflux disease)    Assessment:  Patient having ongoing symptoms despite once daily Protonix administration and has been found to have a moderate-sized sliding hiatal hernia on CT scan imaging    Plan:  Will be discharged with b.i.d. dosing of Protonix as well as p.r.n. Tums or Mylanta. She'll follow up with her primary care provider to ensure improvement of her reflux symptoms on b.i.d. dosing and if they continue, may need EGD for further evaluation      OAB (overactive bladder)    Assessment: Patient was recently started on oxybutynin for this but does not like the side effects and does not feel it is beneficial. She requests that be discontinued during this hospitalization    Plan:  Continue to hold the time of discharge      Essential hypertension with goal blood pressure less than 140/90    Assessment:  Blood pressures were elevated initially but did trend downward towards normal but continued to be mildly elevated even at time of discharge    Plan:  Discharge with ongoing diltiazem as well as b.i.d. dosing of lisinopril/hydrochlorothiazide. Patient will follow up with her primary care provider to discuss these further titration of medication at this      Hypothyroidism, unspecified type    Assessment:  On Synthroid    Plan:  Discharge but change      Insomnia, unspecified    Assessment:  Well controlled with current home regimen    Plan:  Discharge without change      Type 2 diabetes mellitus without complication, without long-term current use of insulin (H)    Assessment:  Patient's metformin was significantly reduced during this hospitalization secondary to poor oral intake. Her oral intake has improved but is still not back to normal at time of discharge    Plan:  Patient will take her evening  metoprolol for the next 1-2 days but will not restart her morning metoprolol until her appetite has returned to baseline.      Dry mouth    Assessment: As secondary to oxybutynin, improving following cessation of this medication    Plan:  Discharge without resumption of oxybutynin          Procedures:   No procedures performed during this admission          Medications Prior to Admission:     Prescriptions Prior to Admission   Medication Sig Dispense Refill Last Dose     pravastatin (PRAVACHOL) 40 MG tablet TAKE ONE TABLET BY MOUTH ONCE DAILY 90 tablet 3 9/2/2017 at pm     potassium chloride (K-TAB,KLOR-CON) 10 MEQ tablet TAKE ONE TABLET BY MOUTH ONCE DAILY 90 tablet 3 9/3/2017 at am     aspirin 81 MG chewable tablet Take 1 tablet (81 mg) by mouth daily 100 tablet 1 9/2/2017 at am     levothyroxine (SYNTHROID/LEVOTHROID) 50 MCG tablet Take 1 tablet (50 mcg) by mouth daily 90 tablet 2 9/3/2017 at am     diltiazem 120 MG 24 hr capsule Take 1 capsule (120 mg) by mouth daily 90 capsule 2      diclofenac (VOLTAREN) 1 % GEL topical gel Apply 4 grams to knees or 2 grams to hands four times daily using enclosed dosing card. 100 g 3 Past Month at Unknown time     blood glucose (NO BRAND SPECIFIED) lancets standard Glucose test strips  Use to test blood sugar 1 times daily or as directed. 1 Box 11 Taking     blood glucose monitoring (NO BRAND SPECIFIED) test strip Use to test blood sugars 1 times daily or as directed 100 strip 3 Taking     amitriptyline (ELAVIL) 25 MG tablet Take 1 tablet (25 mg) by mouth At Bedtime 90 tablet 2 9/2/2017 at 0000     Multiple Vitamins-Minerals (MULTIVITAL PO) Take  by mouth. daily   Past Week at Unknown time     [DISCONTINUED] celecoxib (CELEBREX) 100 MG capsule Take 1 capsule (100 mg) by mouth 2 times daily as needed for moderate pain 60 capsule 3 9/3/2017 at am     [DISCONTINUED] oxybutynin (DITROPAN) 5 MG tablet Take 2 tablets (10 mg) by mouth 2 times daily as needed for bladder spasms 240  tablet 0 9/2/2017 at pm             Discharge Medications:     Current Discharge Medication List      START taking these medications    Details   acetaminophen (TYLENOL) 325 MG tablet Take 2 tablets (650 mg) by mouth every 4 hours as needed for mild pain  Qty: 100 tablet      alum & mag hydroxide-simethicone (MYLANTA ES/MAALOX  ES) 400-400-40 MG/5ML SUSP suspension Take 30 mLs by mouth every 4 hours as needed for indigestion  Refills: 0         CONTINUE these medications which have CHANGED    Details   metFORMIN (GLUCOPHAGE-XR) 500 MG 24 hr tablet For the next few days, continue to take your 2 tabs every evening with meals but hold your morning dose until your appetite returns to normal.  Then resume: ONE TABLET BY MOUTH EVERY MORNING AND TAKE TWO TABLETS BY MOUTH EVERY EVENING WITH MEALS  Qty: 270 tablet, Refills: 2    Associated Diagnoses: Type 2 diabetes mellitus without complication, without long-term current use of insulin (H)      lisinopril-hydrochlorothiazide (PRINZIDE/ZESTORETIC) 20-12.5 MG per tablet Take 1 tablet by mouth 2 times daily  Qty: 60 tablet, Refills: 0    Associated Diagnoses: Type 2 diabetes mellitus without complication, without long-term current use of insulin (H)      pantoprazole (PROTONIX) 40 MG EC tablet Take 1 tablet (40 mg) by mouth 2 times daily (before meals)  Qty: 60 tablet, Refills: 0    Associated Diagnoses: Gastroesophageal reflux disease, esophagitis presence not specified         CONTINUE these medications which have NOT CHANGED    Details   pravastatin (PRAVACHOL) 40 MG tablet TAKE ONE TABLET BY MOUTH ONCE DAILY  Qty: 90 tablet, Refills: 3    Associated Diagnoses: Hyperlipidemia with target LDL less than 100      potassium chloride (K-TAB,KLOR-CON) 10 MEQ tablet TAKE ONE TABLET BY MOUTH ONCE DAILY  Qty: 90 tablet, Refills: 3    Associated Diagnoses: Hypokalemia      aspirin 81 MG chewable tablet Take 1 tablet (81 mg) by mouth daily  Qty: 100 tablet, Refills: 1    Associated  Diagnoses: Type 2 diabetes mellitus without complication, without long-term current use of insulin (H)      levothyroxine (SYNTHROID/LEVOTHROID) 50 MCG tablet Take 1 tablet (50 mcg) by mouth daily  Qty: 90 tablet, Refills: 2    Associated Diagnoses: Hypothyroidism, unspecified type      diltiazem 120 MG 24 hr capsule Take 1 capsule (120 mg) by mouth daily  Qty: 90 capsule, Refills: 2      diclofenac (VOLTAREN) 1 % GEL topical gel Apply 4 grams to knees or 2 grams to hands four times daily using enclosed dosing card.  Qty: 100 g, Refills: 3    Associated Diagnoses: Primary osteoarthritis involving multiple joints      blood glucose (NO BRAND SPECIFIED) lancets standard Glucose test strips  Use to test blood sugar 1 times daily or as directed.  Qty: 1 Box, Refills: 11    Associated Diagnoses: Type 2 diabetes mellitus without complication, without long-term current use of insulin (H)      blood glucose monitoring (NO BRAND SPECIFIED) test strip Use to test blood sugars 1 times daily or as directed  Qty: 100 strip, Refills: 3    Associated Diagnoses: Type 2 diabetes mellitus without complication, without long-term current use of insulin (H)      amitriptyline (ELAVIL) 25 MG tablet Take 1 tablet (25 mg) by mouth At Bedtime  Qty: 90 tablet, Refills: 2    Associated Diagnoses: Insomnia, unspecified      Multiple Vitamins-Minerals (MULTIVITAL PO) Take  by mouth. daily         STOP taking these medications       celecoxib (CELEBREX) 100 MG capsule Comments:   Reason for Stopping:         oxybutynin (DITROPAN) 5 MG tablet Comments:   Reason for Stopping:                     Consultations:   No consultations were requested during this admission          Brief History of Illness:   Patient is a 77-year-old female who presented to the emergency room with fairly sudden onset of nausea and vomiting and feeling unwell, unable to take in anything orally for presented. In the emergency room no infectious etiology was identified the  patient continued to be nauseous despite fluid rehydration and antiemetics and decision was made to place patient under observation for ongoing evaluation          Hospital Course:    Patient had very slow but progressive improvement following fluid resuscitation and antiemetics administration. Her nausea resolved but she continued to have significant heartburn symptoms and CT did show moderate-sized hiatal hernia. Her Protonix was increase to b.i.d. Dosing and patient did have ongoing improvement and resolution of her nausea and vomiting symptoms. She is discharged home in stable condition with increased Protonix and cessation of her Celebrex         Physical Exam:   Vitals were reviewed  Constitutional:   awake, alert, cooperative, no apparent distress, and appears stated age     Lungs:   No increased work of breathing, good air exchange, clear to auscultation bilaterally, no crackles or wheezing     Cardiovascular:   Normal apical impulse, regular rate and rhythm, normal S1 and S2, no S3 or S4, and no murmur noted     Abdomen:   Bowel sounds present, abdomen soft and non-tender     Musculoskeletal:   no lower extremity pitting edema present     Neurologic:   Awake, alert, oriented to name, place and time.       Skin:   normal skin color, texture, turgor           Discharge Instructions and Follow-Up:   Discharge diet: Moderate consistent carbohydrate (3600-5836 daisy / 4-6 CHO units per meal)   Discharge activity: Activity as tolerated   Discharge follow-up: Follow up with primary care provider within 14 days           Discharge Disposition:   Discharged to home      Attestation:  I have reviewed today's vital signs, notes, medications, labs and imaging.    More than 30 minutes was spent on this discharge.      Nereyda Erickson MD    Note: Chart documentation done in part with Dragon Voice Recognition software. Although reviewed after completion, some word and grammatical errors may remain.

## 2017-09-05 NOTE — PROGRESS NOTES
SPIRITUAL HEALTH SERVICES  SPIRITUAL ASSESSMENT Progress Note  [facility]      Pt declined a visit from .  She said  Denia Erickson from [facility] was addressing her spiritual needs.   is available for pt/family needs.    Joseph Varela M.Div., [facility]  Staff   Office tel: [phone]

## 2017-09-06 ENCOUNTER — TELEPHONE (OUTPATIENT)
Dept: FAMILY MEDICINE | Facility: CLINIC | Age: 77
End: 2017-09-06

## 2017-09-06 NOTE — TELEPHONE ENCOUNTER
Inpatient Visit Date: 09/05/17  Diagnosis / Reason for Visit: Intractable Vomiting With Nausea, Unspecified Vomiting Type

## 2017-09-06 NOTE — TELEPHONE ENCOUNTER
"Discharge Instructions and Follow-Up:   Discharge diet: Moderate consistent carbohydrate (1399-6797 daisy / 4-6 CHO units per meal)   Discharge activity: Activity as tolerated   Discharge follow-up: Follow up with primary care provider within 14 days     Hospital/TCU/ED for chronic condition Discharge Protocol    \"Hi, my name is Mansi Cross, a registered nurse, and I am calling from [facility].  I am calling to follow up and see how things are going for you after your recent emergency visit/hospital/TCU stay.\"    Tell me how you are doing now that you are home?\" Better, but very tired, but I am sure that is to be expected.      Discharge Instructions    \"Let's review your discharge instructions.  What is/are the follow-up recommendations?  Pt. Response: I have to  a medication today.  Then I have a follow up appointment with [facility] on 9/14/17.    \"Has an appointment with your primary care provider been scheduled?\"   Yes. (confirm)    \"When you see the provider, I would recommend that you bring your medications with you.\"    Medications    \"Tell me what changed about your medicines when you discharged?\"    Changes to chronic meds?    2 or more - Epic [facility] referral needed    \"What questions do you have about your medications?\"    None     New diagnoses of heart failure, COPD, diabetes, or MI?    No                  Medication reconciliation completed? No, due to patient states her daughter went through everything before discharge yesterday and she does not want a call from [facility].  Was [facility] referral placed (*Make sure to put transitions as reason for referral)?   No    Call Summary    \"What questions or concerns do you have about your recent visit and your follow-up care?\"     none    \"If you have questions or things don't continue to improve, we encourage you contact us through the main clinic number (give number).  Even if the clinic is not open, triage nurses are available 24/7 to help you.     We would like you " "to know that our clinic has extended hours (provide information).  We also have urgent care (provide details on closest location and hours/contact info)\"      \"Thank you for your time and take care!\"    Mansi Cross RN        "

## 2017-09-07 ENCOUNTER — HOSPITAL ENCOUNTER (OUTPATIENT)
Dept: PHYSICAL THERAPY | Facility: CLINIC | Age: 77
Setting detail: THERAPIES SERIES
End: 2017-09-07
Attending: ORTHOPAEDIC SURGERY
Payer: MEDICARE

## 2017-09-07 PROCEDURE — 40000718 ZZHC STATISTIC PT DEPARTMENT ORTHO VISIT: Performed by: PHYSICAL THERAPIST

## 2017-09-07 PROCEDURE — 97161 PT EVAL LOW COMPLEX 20 MIN: CPT | Mod: GP | Performed by: PHYSICAL THERAPIST

## 2017-09-07 PROCEDURE — 97110 THERAPEUTIC EXERCISES: CPT | Mod: GP | Performed by: PHYSICAL THERAPIST

## 2017-09-07 PROCEDURE — G8984 CARRY CURRENT STATUS: HCPCS | Mod: GP,CI | Performed by: PHYSICAL THERAPIST

## 2017-09-07 PROCEDURE — G8985 CARRY GOAL STATUS: HCPCS | Mod: GP,CI | Performed by: PHYSICAL THERAPIST

## 2017-09-07 NOTE — PROGRESS NOTES
[facility]          OUTPATIENT PHYSICAL THERAPY ORTHOPEDIC EVALUATION  PLAN OF TREATMENT FOR OUTPATIENT REHABILITATION  (COMPLETE FOR INITIAL CLAIMS ONLY)  Patient's Last Name, First Name, M.I.  YOB: 1940  Mya Hui    Provider s Name:  [facility]   Medical Record No.  [id number]   Start of Care Date:  09/07/17   Onset Date:  12/25/16   Type:     _X__PT   ___OT   ___SLP Medical Diagnosis:  Rotator cuff arthropathy, right M12.811  - Primary      PT Diagnosis:  faulty posture, SH pain   Visits from SOC:  1      _________________________________________________________________________________  Plan of Treatment: strengthening, mobility ex, pt education, modalities as needed    Functional Goals:  Goal Identifier: 1  Goal Description: Pt will report pain no greater than 2/10 with overhead reaching and housework tasks.  Target Date: 11/05/17    Goal Identifier: 2  Goal Description: Pt will demonstrate independence with HEP and postural modifcations/protective SH techniques in order to improve overall sx and posture.  Target Date: 11/05/17    Therapy Frequency:   (up to 3 total visits)    Predicted Duration of Therapy Intervention:  60 days    Nayeli Locke, PT                 I CERTIFY THE NEED FOR THESE SERVICES FURNISHED UNDER        THIS PLAN OF TREATMENT AND WHILE UNDER MY CARE     (Physician co-signature of this document indicates review and certification of the therapy plan).                         Certification Date From:  09/07/17   Certification Date To:  11/05/17    Referring Provider:  Dr. Annamaria Carpio    Initial Assessment        See Epic Evaluation Start of Care Date: 09/07/17

## 2017-09-07 NOTE — PROGRESS NOTES
09/07/17 0809   General Information   Type of Visit Initial OP Ortho PT Evaluation   Start of Care Date 09/07/17   Referring Physician Dr. Annamaria Carpio   Patient/Family Goals Statement not have anything   Orders Evaluate and Treat   Date of Order 08/28/17   Insurance Type Medicare   Medical Diagnosis Rotator cuff arthropathy, right M12.811  - Primary    Surgical/Medical history reviewed Yes   Precautions/Limitations no known precautions/limitations   Body Part(s)   Body Part(s) Shoulder   Presentation and Etiology   Pertinent history of current problem (include personal factors and/or comorbidities that impact the POC) Had a fall in December and doesn't recall how she exactly fell.  She did have some SH issues prior to that, but thinks the SH sx slowly worsened after the fall.  Had an MRI and saw ortho and had a recent injection which helped 100%.  Pt is not sure she even needs PT.  She had a recent hospitalization this week for a hernia/possible stomach bug.  Has chronic LBP at baseline.  Denies cerv pain.  May follow up with ortho but may cancel if continues to do well, was told she has the option of another cortisone injection in 6 mos.   Impairments A. Pain;D. Decreased ROM   Symptom Location ant humerus, did have some pain below the elbow and in the hand - no specifc fingers   How/Where did it occur At home  (at her apartment)   Onset date of current episode/exacerbation 12/25/16   Chronicity Chronic   Pain rating (0-10 point scale) Other   Pain rating comment now: 0/10, best: 0/10, worst: 10/10   Pain quality A. Sharp;B. Dull   Frequency of pain/symptoms B. Intermittent   Pain/symptoms are: The same all the time   Pain/symptoms exacerbated by M. Other   Pain exacerbation comment lifting, reaching, any movement of the R arm   Pain/symptoms eased by K. Other   Pain eased by comment nothing really helped but the injection   Progression of symptoms since onset: Improved  (100% better)   Current / Previous  "Interventions   Diagnostic Tests: MRI   MRI Results Results   MRI results 1. Full-thickness focal tear of the mid distal supraspinous tendon with associated mild supraspinatus muscle fatty atrophy. 2. Long head of the biceps tendon is not well-seen    Prior Level of Function   Functional Level Prior Comment in summer, grandkids helped with some housework   Current Level of Function   Current Community Support (lives alone)   Patient role/employment history F. Retired   Living environment Apartment/condo   Home/community accessibility drive   Fall Risk Screen   Fall screen completed by PT   Per patient - Fall 2 or more times in past year? No   Per patient - Fall with injury in past year? Yes   Functional Scales   Functional Scales Other   Other Scales  SPADI: 0.77%   Shoulder Objective Findings   Side (if bilateral, select both right and left) Right   Observation no acute distress, wearing B hearing aids   Integumentary  bruising from IV R ant elbow from recent hospitalization   Posture forward head, pitched forward at trunk L SH lower than the R   Cervical Screen (ROM, quadrant) mod loss of B SB and ext, min loss of B rot   Shoulder ROM Comment AROM symmetrical and painfree with the exception of very slight pain on the R with assuming the 90/90 position for ER at 90 deg; R IR was 2.5\" below the L; there was an audible painless click with R SH flexion    Shoulder Special Tests Comments 4/5 B SH strength and elbow strength; no pain reproduced with MMT B   Palpation no pain with palpation to R SH area   Planned Therapy Interventions   Planned Therapy Interventions Comment strengthening, mobility ex, pt education   Planned Modality Interventions   Planned Modality Interventions Comments as needed   Clinical Impression   Criteria for Skilled Therapeutic Interventions Met yes, treatment indicated   PT Diagnosis faulty posture, SH pain   Influenced by the following impairments shoulder pain history   Functional limitations " due to impairments reaching activities, lifting   Clinical Presentation Stable/Uncomplicated  (improving)   Clinical Presentation Rationale History: chronic sx, recent hospitalization; Examination: low SPADI score   Clinical Decision Making (Complexity) Low complexity   Therapy Frequency (up to 3 total visits)   Predicted Duration of Therapy Intervention (days/wks) 60 days   Risk & Benefits of therapy have been explained Yes   Patient, Family & other staff in agreement with plan of care Yes   Clinical Impression Comments Pt presents to PT with resolution of SH pain after her recent SH injection.  However, she would benefit from gentle scapular/RC strengthening and mobility ex to maintain ROM and improve strength for the future.  She would also benefit from postural education and do's and don'ts to prevent further irritation.   Education Assessment   Preferred Learning Style Listening;Reading;Demonstration;Pictures/video   Barriers to Learning No barriers   ORTHO GOALS   PT Ortho Eval Goals 1;2   Ortho Goal 1   Goal Identifier 1   Goal Description Pt will report pain no greater than 2/10 with overhead reaching and housework tasks.   Target Date 11/05/17   Ortho Goal 2   Goal Identifier 2   Goal Description Pt will demonstrate independence with HEP and postural modifcations/protective SH techniques in order to improve overall sx and posture.   Target Date 11/05/17   Therapy Certification   Certification date from 09/07/17   Certification date to 11/05/17   Medical Diagnosis Rotator cuff arthropathy, right M12.811  - Primary

## 2017-09-09 ENCOUNTER — HOSPITAL ENCOUNTER (OUTPATIENT)
Facility: CLINIC | Age: 77
Setting detail: OBSERVATION
Discharge: HOME | End: 2017-09-11
Attending: PHYSICIAN ASSISTANT | Admitting: INTERNAL MEDICINE
Payer: MEDICARE

## 2017-09-09 DIAGNOSIS — E87.6 HYPOKALEMIA: ICD-10-CM

## 2017-09-09 DIAGNOSIS — K44.9 HIATAL HERNIA: Primary | ICD-10-CM

## 2017-09-09 DIAGNOSIS — E86.0 DEHYDRATION: ICD-10-CM

## 2017-09-09 DIAGNOSIS — R74.01 NONSPECIFIC ELEVATION OF LEVELS OF TRANSAMINASE OR LACTIC ACID DEHYDROGENASE (LDH): ICD-10-CM

## 2017-09-09 DIAGNOSIS — R74.02 NONSPECIFIC ELEVATION OF LEVELS OF TRANSAMINASE OR LACTIC ACID DEHYDROGENASE (LDH): ICD-10-CM

## 2017-09-09 DIAGNOSIS — K21.9 GASTROESOPHAGEAL REFLUX DISEASE, ESOPHAGITIS PRESENCE NOT SPECIFIED: ICD-10-CM

## 2017-09-09 DIAGNOSIS — R79.89 ELEVATED LACTIC ACID LEVEL: ICD-10-CM

## 2017-09-09 DIAGNOSIS — R05.9 COUGH: ICD-10-CM

## 2017-09-09 DIAGNOSIS — E11.9 TYPE 2 DIABETES MELLITUS WITHOUT COMPLICATION, WITHOUT LONG-TERM CURRENT USE OF INSULIN (H): ICD-10-CM

## 2017-09-09 LAB
BACTERIA SPEC CULT: NORMAL
BACTERIA SPEC CULT: NORMAL
Lab: NORMAL
Lab: NORMAL
SPECIMEN SOURCE: NORMAL
SPECIMEN SOURCE: NORMAL

## 2017-09-09 PROCEDURE — 99285 EMERGENCY DEPT VISIT HI MDM: CPT | Mod: 25 | Performed by: PHYSICIAN ASSISTANT

## 2017-09-09 PROCEDURE — 99285 EMERGENCY DEPT VISIT HI MDM: CPT | Mod: Z6 | Performed by: PHYSICIAN ASSISTANT

## 2017-09-09 RX ORDER — SODIUM CHLORIDE 9 MG/ML
1000 INJECTION, SOLUTION INTRAVENOUS CONTINUOUS
Status: DISCONTINUED | OUTPATIENT
Start: 2017-09-09 | End: 2017-09-10

## 2017-09-09 RX ORDER — LIDOCAINE 40 MG/G
CREAM TOPICAL
Status: DISCONTINUED | OUTPATIENT
Start: 2017-09-09 | End: 2017-09-10

## 2017-09-09 RX ORDER — IOPAMIDOL 755 MG/ML
500 INJECTION, SOLUTION INTRAVASCULAR ONCE
Status: DISCONTINUED | OUTPATIENT
Start: 2017-09-09 | End: 2017-09-10

## 2017-09-09 RX ORDER — OXYBUTYNIN CHLORIDE 5 MG/1
TABLET, EXTENDED RELEASE ORAL DAILY
COMMUNITY
End: 2018-04-03

## 2017-09-09 RX ORDER — ONDANSETRON 2 MG/ML
4 INJECTION INTRAMUSCULAR; INTRAVENOUS EVERY 30 MIN PRN
Status: DISCONTINUED | OUTPATIENT
Start: 2017-09-09 | End: 2017-09-10

## 2017-09-09 NOTE — LETTER
Transition Communication Hand-off for Care Transitions to Next Level of Care Provider    Name: Mya Hui  MRN #: [id number]  Primary Care Provider: Chanell Nelson  Primary Care MD Name: Chanell Nelson  Primary Clinic: [address] [place] [address] [address]ES[address]S SOMMERS [address]  [facility] Name: [facility]  Reason for Hospitalization:  Cough [R05]  Dehydration [E86.0]  Elevated lactic acid level [R79.89]  Admit Date/Time: 9/9/2017 10:25 PM  Discharge Date: 9/11/17  Payor Source: Payor: MEDICA / Plan: MEDICA PRIME SOLUTION / Product Type: Indemnity /     Readmission Assessment Measure (FRANKIE) Risk Score/category: None        Reason for Communication Hand-off Referral: Non-adherence to plan of care related to:  Other insurance did not cover her PPI med    Discharge Plan: Home without services       Concern for non-adherence with plan of care: No, she is aware of the need to take PPI as ordered     Discharge Needs Assessment:  Needs       Most Recent Value    Anticipated Changes Related to Illness none    Transportation Available family or friend will provide    # of Referrals Placed by [facility] Care Coordination, MTM    F/U Appointment Brochure Provided 09/11/17 [dicussed importance]          Already enrolled in Tele-monitoring program and name of program:  No  Follow-up specialty is recommended: Yes, as scheduled    Follow-up plan:  Future Appointments  Date Time Provider Department Center   9/14/2017 2:15 PM [facility]   9/14/2017 3:15 PM Chanell Nelson APRN CNP [facility]   10/9/2017 1:00 PM Annamaria Carpio MD [facility]       Any outstanding tests or procedures:        Referrals     Future Labs/Procedures    Med Therapy Manage Referral     Comments:    Your provider has referred you to: **[facility] Scheduling (numerous locations) [phone]   [email or web address]    Reason for Referral:  MIHAI, non-adherence due to med cost    The [place] [facility] department will contact you to schedule an appointment.  You may also schedule the appointment by calling [phone].  For [place] patients, please call [phone] to confirm/schedule your appointment on the next business day.    This service is designed to help you get the most from your medications.  A specially trained Pharmacist will work closely with you and your providers to solve any questions, concerns, issues or problems related to your medications.    Please bring all of your prescription and non-prescription medications (such as vitamins, over-the-counter medications, and herbals) or a detailed medication list to your appointment.    If you have a glucose meter or other home monitoring information, please also bring this to your appointment (i.e. blood glucose log, blood pressure log, pain log, etc.).            Key Recommendations: Close follow-up with PCP and continue to assist with drug prior authorization process.    Keli Norris    AVS/Discharge Summary is the source of truth; this is a helpful guide for improved communication of patient story

## 2017-09-09 NOTE — IP AVS SNAPSHOT
MRN:[id number]                      After Visit Summary   9/9/2017    Mya Hui    MRN: [id number]           Thank you!     Thank you for choosing [facility] for your care. Our goal is always to provide you with excellent care. Hearing back from our patients is one way we can continue to improve our services. Please take a few minutes to complete the written survey that you may receive in the mail after you visit with us. Thank you!        Patient Information     Date Of Birth          1940        Designated Caregiver       Most Recent Value    Caregiver    Will someone help with your care after discharge? no      About your hospital stay     You were admitted on:  September 10, 2017 You last received care in the:  [address] [facility]    You were discharged on:  September 11, 2017        Reason for your hospital stay       Hospitalized due to nausea and vomiting with dehydration and improved                  Who to Call     For medical emergencies, please call 911.  For non-urgent questions about your medical care, please call your primary care provider or clinic, [phone]          Attending Provider     Provider Specialty    Malcolm Zuluaga PA-C [facility]    Lucila Richards MD Emergency Medicine    EricksonNereyda MD [facility]       Primary Care Provider Office Phone # Fax #    MAYI Anthony [facility] [phone]      After Care Instructions     Activity       Your activity upon discharge: activity as tolerated            Diet       Follow this diet upon discharge: Advance to your usual diet as tolerated            Monitor and record       blood glucose according to your usual home routine                  Follow-up Appointments     Follow-up and recommended labs and tests        Follow up with primary care provider, Chanell Nelson, within 7 days to evaluate medication change.  The following labs/tests are  "recommended: potassium.                  Your next 10 appointments already scheduled     Sep 14, 2017  2:15 PM CDT   Ortho Treatment with Nayeli Locke PT   [facility] ([address]  [place] [id number] [phone]            Sep 14, 2017  3:15 PM CDT   [facility]    [address] [place] [id number] [phone]           Bring a current list of meds and any records pertaining to this visit. For Physicals, please bring immunization records and any forms needing to be filled out. Please arrive 10 minutes early to complete paperwork.            Oct 09, 2017  1:00 PM CDT   [facility] Visit with Annamaria Carpio MD   [facility]    [address] [place] [address] [id number] [phone]              Pending Results     Date and Time Order Name Status Description    9/10/2017 0528 Beta strep group A culture Preliminary     9/10/2017 0504 Blood culture Preliminary     9/10/2017 0504 Blood culture Preliminary             Statement of Approval     Ordered          09/11/17 0949  I have reviewed and agree with all the recommendations and orders detailed in this document.  EFFECTIVE NOW     Approved and electronically signed by:  Allen Ko MD             Admission Information     Date & Time Provider Department Dept. Phone    9/9/2017 Nereyda Erickson MD [address] [facility] [phone]      Your Vitals Were     Blood Pressure Pulse Temperature Respirations Height Weight    133/71 77 98.1  F (36.7  C) (Oral) 18 1.651 m (5' 5\") 90.5 kg (199 lb 8.3 oz)    Pulse Oximetry BMI (Body Mass Index)                94% 33.2 kg/m2          MyChart Information     [facility] lets you send messages to your doctor, view your test results, renew your prescriptions, schedule appointments " "and more. To sign up, go to [email or web address] . Click on \"Log in\" on the left side of the screen, which will take you to the Welcome page. Then click on \"Sign up Now\" on the right side of the page.     You will be asked to enter the access code listed below, as well as some personal information. Please follow the directions to create your username and password.     Your access code is: [email or web address]  Expires: 2017 11:01 AM     Your access code will  in 90 days. If you need help or a new code, please call your [facility] clinic or [phone].        Care EveryWhere ID     This is your Care EveryWhere ID. This could be used by other organizations to access your [facility] medical records  [id number]        Equal Access to Services     JANETTE RIBEIRO : Janelle Call, soo granger, akash siddiqi, ct johnson . So [facility] [phone].    ATENCIÓN: Si habla español, tiene a pedro disposición servicios gratuitos de asistencia lingüística. Shravan al [phone].    We comply with applicable federal civil rights laws and [place] laws. We do not discriminate on the basis of race, color, national origin, age, disability sex, sexual orientation or gender identity.               Review of your medicines      START taking        Dose / Directions    omeprazole 20 MG CR capsule   Commonly known as:  priLOSEC   Used for:  Gastroesophageal reflux disease, esophagitis presence not specified        Dose:  20 mg   Take 1 capsule (20 mg) by mouth every evening   Quantity:  30 capsule   Refills:  1         CONTINUE these medicines which may have CHANGED, or have new prescriptions. If we are uncertain of the size of tablets/capsules you have at home, strength may be listed as something that might have changed.        Dose / Directions    lisinopril-hydrochlorothiazide 20-12.5 MG per tablet   Commonly known as:  PRINZIDE/ZESTORETIC   This may have changed:  additional " instructions   Used for:  Type 2 diabetes mellitus without complication, without long-term current use of insulin (H)        Dose:  1 tablet   Take 1 tablet by mouth 2 times daily Do not restart until 9/12/17   Quantity:  60 tablet   Refills:  0       metFORMIN 500 MG 24 hr tablet   Commonly known as:  GLUCOPHAGE-XR   This may have changed:  additional instructions   Used for:  Type 2 diabetes mellitus without complication, without long-term current use of insulin (H)        ONE TABLET BY MOUTH EVERY MORNING AND TAKE TWO TABLETS BY MOUTH EVERY EVENING WITH MEALS   Quantity:  270 tablet   Refills:  2       pantoprazole 40 MG EC tablet   Commonly known as:  PROTONIX   This may have changed:  when to take this   Used for:  Gastroesophageal reflux disease, esophagitis presence not specified, Hiatal hernia        Dose:  40 mg   Take 1 tablet (40 mg) by mouth every morning   Quantity:  30 tablet   Refills:  0       potassium chloride 10 MEQ tablet   Commonly known as:  K-TAB,KLOR-CON   This may have changed:  See the new instructions.   Used for:  Hypokalemia        Dose:  10 mEq   Take 1 tablet (10 mEq) by mouth 3 times daily   Quantity:  90 tablet   Refills:  0         CONTINUE these medicines which have NOT CHANGED        Dose / Directions    acetaminophen 325 MG tablet   Commonly known as:  TYLENOL        Dose:  650 mg   Take 2 tablets (650 mg) by mouth every 4 hours as needed for mild pain   Quantity:  100 tablet   Refills:  0       alum & mag hydroxide-simethicone 400-400-40 MG/5ML Susp suspension   Commonly known as:  MYLANTA ES/MAALOX  ES        Dose:  30 mL   Take 30 mLs by mouth every 4 hours as needed for indigestion   Refills:  0       amitriptyline 25 MG tablet   Commonly known as:  ELAVIL   Used for:  Insomnia, unspecified        Dose:  25 mg   Take 1 tablet (25 mg) by mouth At Bedtime   Quantity:  90 tablet   Refills:  2       aspirin 81 MG chewable tablet   Used for:  Type 2 diabetes mellitus without  complication, without long-term current use of insulin (H)        Dose:  81 mg   Take 1 tablet (81 mg) by mouth daily   Quantity:  100 tablet   Refills:  1       blood glucose lancets standard   Commonly known as:  no brand specified   Used for:  Type 2 diabetes mellitus without complication, without long-term current use of insulin (H)        Glucose test strips Use to test blood sugar 1 times daily or as directed.   Quantity:  1 Box   Refills:  11       blood glucose monitoring test strip   Commonly known as:  no brand specified   Used for:  Type 2 diabetes mellitus without complication, without long-term current use of insulin (H)        Use to test blood sugars 1 times daily or as directed   Quantity:  100 strip   Refills:  3       diltiazem 120 MG 24 hr capsule   Commonly known as:  CARDIZEM CD/CARTIA XT        Dose:  120 mg   Take 1 capsule (120 mg) by mouth daily   Quantity:  90 capsule   Refills:  2       levothyroxine 50 MCG tablet   Commonly known as:  SYNTHROID/LEVOTHROID   Used for:  Hypothyroidism, unspecified type        Dose:  50 mcg   Take 1 tablet (50 mcg) by mouth daily   Quantity:  90 tablet   Refills:  2       MULTIVITAL PO        Take  by mouth. daily   Refills:  0       oxybutynin 5 MG 24 hr tablet   Commonly known as:  DITROPAN-XL        Take by mouth daily   Refills:  0       pravastatin 40 MG tablet   Commonly known as:  PRAVACHOL   Used for:  Hyperlipidemia with target LDL less than 100        TAKE ONE TABLET BY MOUTH ONCE DAILY   Quantity:  90 tablet   Refills:  3            Where to get your medicines      These medications were sent to [facility] [place]  [facility] - [address]  [address] [place] [address] [address]     Phone:  [phone]     pantoprazole 40 MG EC tablet    potassium chloride 10 MEQ tablet         Some of these will need a paper prescription and others can be bought over the counter. Ask your nurse if you have questions.     You don't need a  prescription for these medications     omeprazole 20 MG CR capsule                Protect others around you: Learn how to safely use, store and throw away your medicines at www.disposemymeds.org.             Medication List: This is a list of all your medications and when to take them. Check marks below indicate your daily home schedule. Keep this list as a reference.      Medications           Morning Afternoon Evening Bedtime As Needed    acetaminophen 325 MG tablet   Commonly known as:  TYLENOL   Take 2 tablets (650 mg) by mouth every 4 hours as needed for mild pain   Last time this was given:  650 mg on 9/10/2017  8:04 PM                                alum & mag hydroxide-simethicone 400-400-40 MG/5ML Susp suspension   Commonly known as:  MYLANTA ES/MAALOX  ES   Take 30 mLs by mouth every 4 hours as needed for indigestion                                amitriptyline 25 MG tablet   Commonly known as:  ELAVIL   Take 1 tablet (25 mg) by mouth At Bedtime                                aspirin 81 MG chewable tablet   Take 1 tablet (81 mg) by mouth daily                                blood glucose lancets standard   Commonly known as:  no brand specified   Glucose test strips Use to test blood sugar 1 times daily or as directed.                                blood glucose monitoring test strip   Commonly known as:  no brand specified   Use to test blood sugars 1 times daily or as directed                                diltiazem 120 MG 24 hr capsule   Commonly known as:  CARDIZEM CD/CARTIA XT   Take 1 capsule (120 mg) by mouth daily                                levothyroxine 50 MCG tablet   Commonly known as:  SYNTHROID/LEVOTHROID   Take 1 tablet (50 mcg) by mouth daily                                lisinopril-hydrochlorothiazide 20-12.5 MG per tablet   Commonly known as:  PRINZIDE/ZESTORETIC   Take 1 tablet by mouth 2 times daily Do not restart until 9/12/17                                metFORMIN 500 MG 24 hr  tablet   Commonly known as:  GLUCOPHAGE-XR   ONE TABLET BY MOUTH EVERY MORNING AND TAKE TWO TABLETS BY MOUTH EVERY EVENING WITH MEALS                                MULTIVITAL PO   Take  by mouth. daily                                omeprazole 20 MG CR capsule   Commonly known as:  priLOSEC   Take 1 capsule (20 mg) by mouth every evening                                oxybutynin 5 MG 24 hr tablet   Commonly known as:  DITROPAN-XL   Take by mouth daily                                pantoprazole 40 MG EC tablet   Commonly known as:  PROTONIX   Take 1 tablet (40 mg) by mouth every morning   Last time this was given:  40 mg on 9/11/2017  7:34 AM                                potassium chloride 10 MEQ tablet   Commonly known as:  K-TAB,KLOR-CON   Take 1 tablet (10 mEq) by mouth 3 times daily                                pravastatin 40 MG tablet   Commonly known as:  PRAVACHOL   TAKE ONE TABLET BY MOUTH ONCE DAILY

## 2017-09-09 NOTE — IP AVS SNAPSHOT
[address] [facility]    [address] [facility]     [place] [address]    Phone:  [phone]                                       After Visit Summary   9/9/2017    Mya Hui    MRN: [id number]           After Visit Summary Signature Page     I have received my discharge instructions, and my questions have been answered. I have discussed any challenges I see with this plan with the nurse or doctor.    ..........................................................................................................................................  Patient/Patient Representative Signature      ..........................................................................................................................................  Patient Representative Print Name and Relationship to Patient    ..................................................               ................................................  Date                                            Time    ..........................................................................................................................................  Reviewed by Signature/Title    ...................................................              ..............................................  Date                                                            Time

## 2017-09-10 ENCOUNTER — APPOINTMENT (OUTPATIENT)
Dept: GENERAL RADIOLOGY | Facility: CLINIC | Age: 77
End: 2017-09-10
Attending: PHYSICIAN ASSISTANT
Payer: MEDICARE

## 2017-09-10 PROBLEM — D72.829 LEUCOCYTOSIS: Status: ACTIVE | Noted: 2017-09-10

## 2017-09-10 PROBLEM — R79.89 ELEVATED LACTIC ACID LEVEL: Status: ACTIVE | Noted: 2017-09-10

## 2017-09-10 PROBLEM — N17.9 ACUTE KIDNEY INJURY (H): Status: ACTIVE | Noted: 2017-09-10

## 2017-09-10 PROBLEM — R05.9 COUGH: Status: ACTIVE | Noted: 2017-09-10

## 2017-09-10 PROBLEM — K59.00 CONSTIPATION: Status: ACTIVE | Noted: 2017-09-10

## 2017-09-10 LAB
ALBUMIN SERPL-MCNC: 3.6 G/DL (ref 3.4–5)
ALBUMIN UR-MCNC: NEGATIVE MG/DL
ALP SERPL-CCNC: 79 U/L (ref 40–150)
ALT SERPL W P-5'-P-CCNC: 29 U/L (ref 0–50)
ANION GAP SERPL CALCULATED.3IONS-SCNC: 14 MMOL/L (ref 3–14)
ANION GAP SERPL CALCULATED.3IONS-SCNC: 9 MMOL/L (ref 3–14)
APPEARANCE UR: CLEAR
AST SERPL W P-5'-P-CCNC: 16 U/L (ref 0–45)
BASOPHILS # BLD AUTO: 0 10E9/L (ref 0–0.2)
BASOPHILS # BLD AUTO: 0 10E9/L (ref 0–0.2)
BASOPHILS NFR BLD AUTO: 0.1 %
BASOPHILS NFR BLD AUTO: 0.1 %
BILIRUB SERPL-MCNC: 0.8 MG/DL (ref 0.2–1.3)
BILIRUB UR QL STRIP: NEGATIVE
BUN SERPL-MCNC: 22 MG/DL (ref 7–30)
BUN SERPL-MCNC: 30 MG/DL (ref 7–30)
CALCIUM SERPL-MCNC: 8.1 MG/DL (ref 8.5–10.1)
CALCIUM SERPL-MCNC: 9.4 MG/DL (ref 8.5–10.1)
CHLORIDE SERPL-SCNC: 104 MMOL/L (ref 94–109)
CHLORIDE SERPL-SCNC: 110 MMOL/L (ref 94–109)
CO2 SERPL-SCNC: 25 MMOL/L (ref 20–32)
CO2 SERPL-SCNC: 26 MMOL/L (ref 20–32)
COLOR UR AUTO: YELLOW
CREAT SERPL-MCNC: 1.19 MG/DL (ref 0.52–1.04)
CREAT SERPL-MCNC: 1.41 MG/DL (ref 0.52–1.04)
CRP SERPL-MCNC: 24.4 MG/L (ref 0–8)
DEPRECATED S PYO AG THROAT QL EIA: NORMAL
DIFFERENTIAL METHOD BLD: ABNORMAL
DIFFERENTIAL METHOD BLD: ABNORMAL
EOSINOPHIL # BLD AUTO: 0 10E9/L (ref 0–0.7)
EOSINOPHIL # BLD AUTO: 0.1 10E9/L (ref 0–0.7)
EOSINOPHIL NFR BLD AUTO: 0.3 %
EOSINOPHIL NFR BLD AUTO: 0.6 %
ERYTHROCYTE [DISTWIDTH] IN BLOOD BY AUTOMATED COUNT: 14.7 % (ref 10–15)
ERYTHROCYTE [DISTWIDTH] IN BLOOD BY AUTOMATED COUNT: 14.9 % (ref 10–15)
GFR SERPL CREATININE-BSD FRML MDRD: 36 ML/MIN/1.7M2
GFR SERPL CREATININE-BSD FRML MDRD: 44 ML/MIN/1.7M2
GLUCOSE BLDC GLUCOMTR-MCNC: 101 MG/DL (ref 70–99)
GLUCOSE BLDC GLUCOMTR-MCNC: 121 MG/DL (ref 70–99)
GLUCOSE BLDC GLUCOMTR-MCNC: 122 MG/DL (ref 70–99)
GLUCOSE BLDC GLUCOMTR-MCNC: 94 MG/DL (ref 70–99)
GLUCOSE SERPL-MCNC: 103 MG/DL (ref 70–99)
GLUCOSE SERPL-MCNC: 179 MG/DL (ref 70–99)
GLUCOSE UR STRIP-MCNC: NEGATIVE MG/DL
HCT VFR BLD AUTO: 35 % (ref 35–47)
HCT VFR BLD AUTO: 41.4 % (ref 35–47)
HGB BLD-MCNC: 11.1 G/DL (ref 11.7–15.7)
HGB BLD-MCNC: 13.3 G/DL (ref 11.7–15.7)
HGB UR QL STRIP: NEGATIVE
HYALINE CASTS #/AREA URNS LPF: 8 /LPF (ref 0–2)
KETONES UR STRIP-MCNC: NEGATIVE MG/DL
LACTATE BLD-SCNC: 1 MMOL/L (ref 0.7–2)
LACTATE BLD-SCNC: 1.5 MMOL/L (ref 0.7–2)
LACTATE BLD-SCNC: 2.6 MMOL/L (ref 0.7–2)
LACTATE BLD-SCNC: 3.6 MMOL/L (ref 0.7–2)
LEUKOCYTE ESTERASE UR QL STRIP: NEGATIVE
LIPASE SERPL-CCNC: 171 U/L (ref 73–393)
LYMPHOCYTES # BLD AUTO: 0.8 10E9/L (ref 0.8–5.3)
LYMPHOCYTES # BLD AUTO: 0.9 10E9/L (ref 0.8–5.3)
LYMPHOCYTES NFR BLD AUTO: 5.1 %
LYMPHOCYTES NFR BLD AUTO: 5.5 %
MCH RBC QN AUTO: 28.3 PG (ref 26.5–33)
MCH RBC QN AUTO: 28.3 PG (ref 26.5–33)
MCHC RBC AUTO-ENTMCNC: 31.7 G/DL (ref 31.5–36.5)
MCHC RBC AUTO-ENTMCNC: 32.1 G/DL (ref 31.5–36.5)
MCV RBC AUTO: 88 FL (ref 78–100)
MCV RBC AUTO: 89 FL (ref 78–100)
MONOCYTES # BLD AUTO: 1.6 10E9/L (ref 0–1.3)
MONOCYTES # BLD AUTO: 1.8 10E9/L (ref 0–1.3)
MONOCYTES NFR BLD AUTO: 11.5 %
MONOCYTES NFR BLD AUTO: 9.2 %
MUCOUS THREADS #/AREA URNS LPF: PRESENT /LPF
NEUTROPHILS # BLD AUTO: 12.9 10E9/L (ref 1.6–8.3)
NEUTROPHILS # BLD AUTO: 14.4 10E9/L (ref 1.6–8.3)
NEUTROPHILS NFR BLD AUTO: 83 %
NEUTROPHILS NFR BLD AUTO: 84.6 %
NITRATE UR QL: NEGATIVE
PH UR STRIP: 5 PH (ref 5–7)
PLATELET # BLD AUTO: 199 10E9/L (ref 150–450)
PLATELET # BLD AUTO: 246 10E9/L (ref 150–450)
POTASSIUM SERPL-SCNC: 3.5 MMOL/L (ref 3.4–5.3)
POTASSIUM SERPL-SCNC: 3.7 MMOL/L (ref 3.4–5.3)
PROT SERPL-MCNC: 7.4 G/DL (ref 6.8–8.8)
RBC # BLD AUTO: 3.92 10E12/L (ref 3.8–5.2)
RBC # BLD AUTO: 4.7 10E12/L (ref 3.8–5.2)
RBC #/AREA URNS AUTO: <1 /HPF (ref 0–2)
SODIUM SERPL-SCNC: 143 MMOL/L (ref 133–144)
SODIUM SERPL-SCNC: 145 MMOL/L (ref 133–144)
SOURCE: ABNORMAL
SP GR UR STRIP: 1.01 (ref 1–1.03)
SPECIMEN SOURCE: NORMAL
SQUAMOUS #/AREA URNS AUTO: 1 /HPF (ref 0–1)
UROBILINOGEN UR STRIP-MCNC: 2 MG/DL (ref 0–2)
WBC # BLD AUTO: 11.7 10E9/L (ref 4–11)
WBC # BLD AUTO: 15.5 10E9/L (ref 4–11)
WBC # BLD AUTO: 17 10E9/L (ref 4–11)
WBC #/AREA URNS AUTO: 1 /HPF (ref 0–2)

## 2017-09-10 PROCEDURE — 25000125 ZZHC RX 250: Performed by: PHYSICIAN ASSISTANT

## 2017-09-10 PROCEDURE — 83605 ASSAY OF LACTIC ACID: CPT | Performed by: INTERNAL MEDICINE

## 2017-09-10 PROCEDURE — 96361 HYDRATE IV INFUSION ADD-ON: CPT

## 2017-09-10 PROCEDURE — 85025 COMPLETE CBC W/AUTO DIFF WBC: CPT | Performed by: PHYSICIAN ASSISTANT

## 2017-09-10 PROCEDURE — 83605 ASSAY OF LACTIC ACID: CPT | Performed by: FAMILY MEDICINE

## 2017-09-10 PROCEDURE — A9270 NON-COVERED ITEM OR SERVICE: HCPCS | Mod: GY | Performed by: INTERNAL MEDICINE

## 2017-09-10 PROCEDURE — 81001 URINALYSIS AUTO W/SCOPE: CPT | Performed by: INTERNAL MEDICINE

## 2017-09-10 PROCEDURE — 80053 COMPREHEN METABOLIC PANEL: CPT | Performed by: PHYSICIAN ASSISTANT

## 2017-09-10 PROCEDURE — 83690 ASSAY OF LIPASE: CPT | Performed by: PHYSICIAN ASSISTANT

## 2017-09-10 PROCEDURE — 87880 STREP A ASSAY W/OPTIC: CPT | Performed by: INTERNAL MEDICINE

## 2017-09-10 PROCEDURE — 25000132 ZZH RX MED GY IP 250 OP 250 PS 637: Mod: GY | Performed by: INTERNAL MEDICINE

## 2017-09-10 PROCEDURE — 87040 BLOOD CULTURE FOR BACTERIA: CPT | Performed by: INTERNAL MEDICINE

## 2017-09-10 PROCEDURE — G0378 HOSPITAL OBSERVATION PER HR: HCPCS

## 2017-09-10 PROCEDURE — 83605 ASSAY OF LACTIC ACID: CPT | Mod: 91 | Performed by: PHYSICIAN ASSISTANT

## 2017-09-10 PROCEDURE — 25000128 H RX IP 250 OP 636: Performed by: PHYSICIAN ASSISTANT

## 2017-09-10 PROCEDURE — 71020 XR CHEST 2 VW: CPT | Mod: TC

## 2017-09-10 PROCEDURE — 96361 HYDRATE IV INFUSION ADD-ON: CPT | Performed by: PHYSICIAN ASSISTANT

## 2017-09-10 PROCEDURE — 96374 THER/PROPH/DIAG INJ IV PUSH: CPT | Performed by: PHYSICIAN ASSISTANT

## 2017-09-10 PROCEDURE — 00000146 ZZHCL STATISTIC GLUCOSE BY METER IP

## 2017-09-10 PROCEDURE — A9270 NON-COVERED ITEM OR SERVICE: HCPCS | Mod: GY | Performed by: FAMILY MEDICINE

## 2017-09-10 PROCEDURE — 85048 AUTOMATED LEUKOCYTE COUNT: CPT | Mod: 91 | Performed by: INTERNAL MEDICINE

## 2017-09-10 PROCEDURE — 25000132 ZZH RX MED GY IP 250 OP 250 PS 637: Mod: GY | Performed by: FAMILY MEDICINE

## 2017-09-10 PROCEDURE — 27210995 ZZH RX 272: Performed by: FAMILY MEDICINE

## 2017-09-10 PROCEDURE — 80048 BASIC METABOLIC PNL TOTAL CA: CPT | Performed by: INTERNAL MEDICINE

## 2017-09-10 PROCEDURE — 25000128 H RX IP 250 OP 636: Performed by: FAMILY MEDICINE

## 2017-09-10 PROCEDURE — 36415 COLL VENOUS BLD VENIPUNCTURE: CPT | Performed by: INTERNAL MEDICINE

## 2017-09-10 PROCEDURE — 99219 ZZC INITIAL OBSERVATION CARE,LEVL II: CPT | Performed by: INTERNAL MEDICINE

## 2017-09-10 PROCEDURE — 96375 TX/PRO/DX INJ NEW DRUG ADDON: CPT | Performed by: PHYSICIAN ASSISTANT

## 2017-09-10 PROCEDURE — 87081 CULTURE SCREEN ONLY: CPT | Performed by: INTERNAL MEDICINE

## 2017-09-10 PROCEDURE — 85025 COMPLETE CBC W/AUTO DIFF WBC: CPT | Performed by: FAMILY MEDICINE

## 2017-09-10 PROCEDURE — 25000128 H RX IP 250 OP 636: Performed by: INTERNAL MEDICINE

## 2017-09-10 PROCEDURE — 86140 C-REACTIVE PROTEIN: CPT | Performed by: PHYSICIAN ASSISTANT

## 2017-09-10 RX ORDER — SODIUM CHLORIDE 9 MG/ML
INJECTION, SOLUTION INTRAVENOUS CONTINUOUS
Status: DISCONTINUED | OUTPATIENT
Start: 2017-09-10 | End: 2017-09-10

## 2017-09-10 RX ORDER — PANTOPRAZOLE SODIUM 40 MG/1
40 TABLET, DELAYED RELEASE ORAL
Qty: 60 TABLET | Refills: 0 | Status: SHIPPED | OUTPATIENT
Start: 2017-09-10 | End: 2017-09-11

## 2017-09-10 RX ORDER — SODIUM CHLORIDE 450 MG/100ML
INJECTION, SOLUTION INTRAVENOUS CONTINUOUS
Status: DISCONTINUED | OUTPATIENT
Start: 2017-09-10 | End: 2017-09-11

## 2017-09-10 RX ORDER — NICOTINE POLACRILEX 4 MG
15-30 LOZENGE BUCCAL
Status: DISCONTINUED | OUTPATIENT
Start: 2017-09-10 | End: 2017-09-11 | Stop reason: HOSPADM

## 2017-09-10 RX ORDER — PANTOPRAZOLE SODIUM 40 MG/1
40 TABLET, DELAYED RELEASE ORAL
Status: DISCONTINUED | OUTPATIENT
Start: 2017-09-10 | End: 2017-09-11 | Stop reason: HOSPADM

## 2017-09-10 RX ORDER — POLYETHYLENE GLYCOL 3350 17 G/17G
17 POWDER, FOR SOLUTION ORAL DAILY
Status: DISCONTINUED | OUTPATIENT
Start: 2017-09-10 | End: 2017-09-11 | Stop reason: HOSPADM

## 2017-09-10 RX ORDER — ONDANSETRON 4 MG/1
4 TABLET, ORALLY DISINTEGRATING ORAL EVERY 6 HOURS PRN
Status: DISCONTINUED | OUTPATIENT
Start: 2017-09-10 | End: 2017-09-11 | Stop reason: HOSPADM

## 2017-09-10 RX ORDER — PROCHLORPERAZINE 25 MG
12.5 SUPPOSITORY, RECTAL RECTAL EVERY 12 HOURS PRN
Status: DISCONTINUED | OUTPATIENT
Start: 2017-09-10 | End: 2017-09-11 | Stop reason: HOSPADM

## 2017-09-10 RX ORDER — ACETAMINOPHEN 325 MG/1
650 TABLET ORAL EVERY 4 HOURS PRN
Status: DISCONTINUED | OUTPATIENT
Start: 2017-09-10 | End: 2017-09-11 | Stop reason: HOSPADM

## 2017-09-10 RX ORDER — PROCHLORPERAZINE MALEATE 5 MG
5 TABLET ORAL EVERY 6 HOURS PRN
Status: DISCONTINUED | OUTPATIENT
Start: 2017-09-10 | End: 2017-09-11 | Stop reason: HOSPADM

## 2017-09-10 RX ORDER — ONDANSETRON 2 MG/ML
4 INJECTION INTRAMUSCULAR; INTRAVENOUS EVERY 6 HOURS PRN
Status: DISCONTINUED | OUTPATIENT
Start: 2017-09-10 | End: 2017-09-11 | Stop reason: HOSPADM

## 2017-09-10 RX ORDER — DEXTROSE MONOHYDRATE 25 G/50ML
25-50 INJECTION, SOLUTION INTRAVENOUS
Status: DISCONTINUED | OUTPATIENT
Start: 2017-09-10 | End: 2017-09-11 | Stop reason: HOSPADM

## 2017-09-10 RX ORDER — NALOXONE HYDROCHLORIDE 0.4 MG/ML
.1-.4 INJECTION, SOLUTION INTRAMUSCULAR; INTRAVENOUS; SUBCUTANEOUS
Status: DISCONTINUED | OUTPATIENT
Start: 2017-09-10 | End: 2017-09-11 | Stop reason: HOSPADM

## 2017-09-10 RX ORDER — AMOXICILLIN 250 MG
1 CAPSULE ORAL 2 TIMES DAILY PRN
Status: DISCONTINUED | OUTPATIENT
Start: 2017-09-10 | End: 2017-09-11 | Stop reason: HOSPADM

## 2017-09-10 RX ADMIN — PANTOPRAZOLE SODIUM 40 MG: 40 TABLET, DELAYED RELEASE ORAL at 16:42

## 2017-09-10 RX ADMIN — SODIUM CHLORIDE: 4.5 INJECTION, SOLUTION INTRAVENOUS at 19:50

## 2017-09-10 RX ADMIN — ACETAMINOPHEN 650 MG: 325 TABLET ORAL at 11:40

## 2017-09-10 RX ADMIN — SODIUM CHLORIDE 1000 ML: 9 INJECTION, SOLUTION INTRAVENOUS at 03:19

## 2017-09-10 RX ADMIN — SODIUM CHLORIDE: 4.5 INJECTION, SOLUTION INTRAVENOUS at 14:04

## 2017-09-10 RX ADMIN — POLYETHYLENE GLYCOL 3350 17 G: 17 POWDER, FOR SOLUTION ORAL at 16:42

## 2017-09-10 RX ADMIN — ACETAMINOPHEN 650 MG: 325 TABLET ORAL at 20:04

## 2017-09-10 RX ADMIN — PANTOPRAZOLE SODIUM 40 MG: 40 INJECTION, POWDER, FOR SOLUTION INTRAVENOUS at 00:05

## 2017-09-10 RX ADMIN — SODIUM CHLORIDE, POTASSIUM CHLORIDE, SODIUM LACTATE AND CALCIUM CHLORIDE 1000 ML: 600; 310; 30; 20 INJECTION, SOLUTION INTRAVENOUS at 05:50

## 2017-09-10 RX ADMIN — PANTOPRAZOLE SODIUM 40 MG: 40 TABLET, DELAYED RELEASE ORAL at 07:52

## 2017-09-10 RX ADMIN — SODIUM CHLORIDE 1000 ML: 9 INJECTION, SOLUTION INTRAVENOUS at 01:27

## 2017-09-10 RX ADMIN — SODIUM CHLORIDE: 9 INJECTION, SOLUTION INTRAVENOUS at 11:37

## 2017-09-10 RX ADMIN — ONDANSETRON 4 MG: 2 SOLUTION INTRAMUSCULAR; INTRAVENOUS at 00:08

## 2017-09-10 RX ADMIN — SODIUM CHLORIDE 1000 ML: 9 INJECTION, SOLUTION INTRAVENOUS at 00:04

## 2017-09-10 RX ADMIN — SODIUM CHLORIDE: 9 INJECTION, SOLUTION INTRAVENOUS at 05:09

## 2017-09-10 NOTE — PROGRESS NOTES
Has now developed low grade fever since arriving on the floor.  Will check ua/uc and get BC.  CXR already done and not showing an infiltrate.  Will hold on antibiotics and repeat labs as planned.  Would consider repeat CT of her abdomen with contrast if her renal function improves and if she has any ongoing abdominal pain.  Her pain was improved but not yet completely resolved.  She was non tender on exam.  Will repeat IV fluid bolus and recheck lactic acid sooner.    electronically signed by Giovany Seth M.D.

## 2017-09-10 NOTE — ED NOTES
Patient up to the bedside commode with assist of one.  She did have incontinent diarrhea while transferring self from the cart to the commode.  She then had a large formed stool.  [facility] care was again completed and a brief was placed on patient.  Once back on to the cart, two warm blankets were given due to she was shivering and complaints of being very cold.  Call light in place.

## 2017-09-10 NOTE — PROGRESS NOTES
Patient's WBC continues to decreased from 17.0 down to 11.7, creatinine is decreasing from 1.41 down to 1.19 and lactic acid has gone from 3.6 down to 1.0 with IV fluid hydration alone.  Patient is starting to feel better and is having increased oral intake.  On further discussion with family, patient ran out of her Protonix medication on Wednesday morning and the prescription given to her from her recent hospital stay was not approved by insurance so she was without her GERD medications from Wednesday on, now presenting with symptoms of cough and hoarse voice started on Thursday and nausea started on Friday with vomiting starting on Saturday.  Patient has a known moderate hiatal hernia and GERD and once a day dosing was not enough to prevent her last hospitalization but she found stability and return of her appetite and resolution of GI symptoms with BID dosing during her recent inpatient hospital stay.  It is very possible that the lack of PPI medication BID is what triggered the need for this observation stay as patient became dehydrated secondary to being unable to tolerate oral intake.  Will start on prior authorization process today for BID dosing, as this is felt essential to her ongoing treatment.      Electronically Signed:  Nereyda Erickson MD

## 2017-09-10 NOTE — ED PROVIDER NOTES
History   No chief complaint on file.    HPI  Mya Hui is a 77 year old female who presents for evaluation of multiple symptoms. 3 days of dry coughing throughout the day and night. Worse at night. Developed a sore throat due to the persistent cough. She states her voice is hoarse as well. She presented to an outside urgent care for evaluation today and was prescribed a Zithromax Z-SMILEY. She denies getting any laboratory or x-ray studies done. She was told that she had a chest cold.  She has also had left lower quadrant abdominal pain for the last couple days. She is concerned as she has not had a BM for about one week. Her daughter reports that there was a small BM in the toilet when she went to see her this evening. Patient developed nausea and vomiting at about 1800 today. She did eat today. She reports that the pain in the left lower quadrant and comes and goes. She states, it feels like I have to go, but I can't quote. She was admitted in this facility for a 2 day evaluation regarding persistent nausea and vomiting. He was determined to be related to possible hiatal hernia with gastritis. She had her Protonix increased to b.i.d. dosing. She has not had any Protonix now for 4 days, as insurance has not approved the elevated dosing of the medication, and she has been without the medication due to insurance issues. She does feel that her GERD symptoms have increased. She feels a burning sensation coming up and also has some acid brash issues. Denies any blood per rectum. She reports that she has not drank much fluid during the day. She denies past history of congestive heart failure. Denies any fevers or chills. She does report feeling clammy this afternoon, especially when she was experiencing nausea and vomiting. She does feel generalized weakness as well. Denies any dysuria, frequency, urgency, or flank pain. No chest pain.        I have reviewed the Medications, Allergies, Past Medical and Surgical  History, and Social History in the Epic system.    Allergies:   Allergies   Allergen Reactions     Codeine Nausea     Fentanyl Nausea and Vomiting     VERY sensitive to most narcotics if not all.         No current facility-administered medications on file prior to encounter.   Current Outpatient Prescriptions on File Prior to Encounter:  metFORMIN (GLUCOPHAGE-XR) 500 MG 24 hr tablet For the next few days, continue to take your 2 tabs every evening with meals but hold your morning dose until your appetite returns to normal.  Then resume: ONE TABLET BY MOUTH EVERY MORNING AND TAKE TWO TABLETS BY MOUTH EVERY EVENING WITH MEALS   lisinopril-hydrochlorothiazide (PRINZIDE/ZESTORETIC) 20-12.5 MG per tablet Take 1 tablet by mouth 2 times daily   pantoprazole (PROTONIX) 40 MG EC tablet Take 1 tablet (40 mg) by mouth 2 times daily (before meals)   pravastatin (PRAVACHOL) 40 MG tablet TAKE ONE TABLET BY MOUTH ONCE DAILY   potassium chloride (K-TAB,KLOR-CON) 10 MEQ tablet TAKE ONE TABLET BY MOUTH ONCE DAILY   aspirin 81 MG chewable tablet Take 1 tablet (81 mg) by mouth daily   levothyroxine (SYNTHROID/LEVOTHROID) 50 MCG tablet Take 1 tablet (50 mcg) by mouth daily   diltiazem 120 MG 24 hr capsule Take 1 capsule (120 mg) by mouth daily   amitriptyline (ELAVIL) 25 MG tablet Take 1 tablet (25 mg) by mouth At Bedtime   Multiple Vitamins-Minerals (MULTIVITAL PO) Take  by mouth. daily   acetaminophen (TYLENOL) 325 MG tablet Take 2 tablets (650 mg) by mouth every 4 hours as needed for mild pain   alum & mag hydroxide-simethicone (MYLANTA ES/MAALOX  ES) 400-400-40 MG/5ML SUSP suspension Take 30 mLs by mouth every 4 hours as needed for indigestion   blood glucose (NO BRAND SPECIFIED) lancets standard Glucose test stripsUse to test blood sugar 1 times daily or as directed.   blood glucose monitoring (NO BRAND SPECIFIED) test strip Use to test blood sugars 1 times daily or as directed       Patient Active Problem List   Diagnosis      Advance Care Planning     Arthritis     Nevus     Asymptomatic carotid artery stenosis     Hyperlipidemia with target LDL less than 100     GERD (gastroesophageal reflux disease)     History of renal calculi     Sciatica of right side     Hip pain     Hearing aid worn     Osteoarthritis of hip     DDD (degenerative disc disease), lumbar     OAB (overactive bladder)     Essential hypertension with goal blood pressure less than 140/90     Hypothyroidism, unspecified type     Insomnia, unspecified     Type 2 diabetes mellitus without complication, without long-term current use of insulin (H)     Cataract, bilateral     Primary osteoarthritis involving multiple joints     Chronic right shoulder pain     Right shoulder pain, unspecified chronicity     Dry mouth     Rotator cuff arthropathy, right     Nausea and vomiting in adult     Hypokalemia     Dehydration       Past Surgical History:   Procedure Laterality Date     BUNIONECTOMY      Right foot     CATARACT IOL, RT/LT Bilateral 2017     COLONOSCOPY  7/12/2013    Procedure: COLONOSCOPY;  Colonoscopy;  Surgeon: Giovany Espinoza MD;  Location: [facility]     FRACTURE TX, ANKLE RT/LT      Left ankle     HC CYSTOURETHROSCOPY W/ URETEROSCOPY &/OR PYELOSCOPY; W/ LITHOTRIPSY       HC REVISE MEDIAN N/CARPAL TUNNEL SURG      Right wrist     INJECT JOINT SACROILIAC  4/10/2014    Procedure: INJECT JOINT SACROILIAC;  Sacroiliac Joint Injection;  Surgeon: Gilbert Mcclure MD;  Location: [facility]       Social History   Substance Use Topics     Smoking status: Never Smoker     Smokeless tobacco: Never Used     Alcohol use Yes       Most Recent Immunizations   Administered Date(s) Administered     Influenza (High Dose) 3 valent vaccine 11/10/2016     Influenza (IIV3) 10/01/2012     Pneumococcal (PCV 13) 11/10/2016     Pneumococcal 23 valent 09/15/2011     TD (ADULT, 7+) 09/15/2011     TDAP Vaccine (Boostrix) 04/08/2015     Zoster vaccine, live 04/18/2012       BMI: Estimated body mass  "index is 35 kg/(m^2) as calculated from the following:    Height as of 9/4/17: 1.626 m (5' 4\").    Weight as of 9/4/17: 92.5 kg (203 lb 14.8 oz).      Review of Systems   All other systems reviewed and are negative.      Physical Exam   BP: 112/64  Pulse: 88  Temp: 98.1  F (36.7  C)  Resp: 16  SpO2: 92 %  Physical Exam  Appears ill. Somewhat pale, but I do not know what her baseline is. Her oral mucous membranes are very dry and caked. Her daughter states that she suffers with chronic dry mouth, but the mouth is much drier than usual.  Skin:  No rashes or lesions are noted on inspection of the torso, face, and upper extremities.   Head: Normocephalic, atraumatic, nontender to palpation  Eyes: PERRLA, conjunctiva and sclera clear  Ears: Bilateral TM's and canals are clear.  TM's translucent without erythema or effusion.  Nose: Nares normal and patent bilaterally.  Mucous membranes are non-erythematous and non-edematous.  No sinus tenderness.  Throat: Mucous membranes are clear.  No tonsilar hypertrophy, exudate, or erythema.  Neck: Supple.  FROM without pain.  No adenopathy.  No thyromegaly.   Heart:  RRR with normal S1 and S2.  No S3 or S4.  No murmur, rub, gallop, or click.  PMI is nondisplaced.   Lungs:  CTA bilaterally without wheezes, rales, or rhonchi.  Good breath sounds heard throughout all lung fields.  Tympanitic to percussion with no areas of dullness.   ABDOMEN: negative findings: umbilicus normal, symmetric, no masses palpable, no organomegaly, bowel sounds normal, no bruits heard, liver span normal to percussion, positive findings: tenderness moderate/severe and rebound,guarding absent LLQ       ED Course     ED Course     Patient appears quite ill at this point with a severely dry mouth and pale color to her skin. She has significant left lower quadrant abdominal tenderness with nausea, vomiting, and no bowel movement for the past 7 days other than a small BM just prior to arrival. She certainly could " have bowel obstruction versus other infectious or inflammatory abnormality abdomen. Abdominal/pelvis CT has been ordered along with a chest x-ray and other laboratory levels. 1 L IV bolus initially. Patient declines needing anything for pain at this time. IV Zofran has been ordered.      Procedures             Critical Care time:  none        Lactate is greater than 2 due to possible dehydration, at this time there is no sign of sepsis.  1 L of IV fluid bolus has artery been initiated, and I will add another liter to help with her rehydration. Upon her repeat exam, she does not have any abdominal discomfort after her rather large formed and loose bowel movement. Her color is much better already. I'm not able to elicit any abdominal discomfort with deep palpation in the left lower quadrant and he further. Her white blood cell count is elevated at 17,000, but her chest x-ray is negative for infiltrate. Rather large hiatal hernia present in the chest. I'm still concerned that the uncontrolled reflux and hiatal hernia could be the cause of her dry cough and hoarse voice. She has been without her PPI medication for 4 days now, and was initially doing better on the b.i.d. dosing of this when she was inpatient.          Results for orders placed or performed during the hospital encounter of 09/09/17   XR Chest 2 Views    Narrative    XR CHEST 2 VW  9/10/2017 12:43 AM      HISTORY: Cough.     COMPARISON: 9/3/2017.    FINDINGS: The heart size is normal without pulmonary edema. The  thoracic aorta is calcified. There is a large hiatal hernia. The lungs  are clear. No pneumothorax or pleural effusion.      Impression    IMPRESSION: No acute abnormality.   CBC with platelets differential   Result Value Ref Range    WBC 17.0 (H) 4.0 - 11.0 10e9/L    RBC Count 4.70 3.8 - 5.2 10e12/L    Hemoglobin 13.3 11.7 - 15.7 g/dL    Hematocrit 41.4 35.0 - 47.0 %    MCV 88 78 - 100 fl    MCH 28.3 26.5 - 33.0 pg    MCHC 32.1 31.5 - 36.5 g/dL     RDW 14.9 10.0 - 15.0 %    Platelet Count 246 150 - 450 10e9/L    Diff Method Automated Method     % Neutrophils 84.6 %    % Lymphocytes 5.5 %    % Monocytes 9.2 %    % Eosinophils 0.6 %    % Basophils 0.1 %    Absolute Neutrophil 14.4 (H) 1.6 - 8.3 10e9/L    Absolute Lymphocytes 0.9 0.8 - 5.3 10e9/L    Absolute Monocytes 1.6 (H) 0.0 - 1.3 10e9/L    Absolute Eosinophils 0.1 0.0 - 0.7 10e9/L    Absolute Basophils 0.0 0.0 - 0.2 10e9/L   Comprehensive metabolic panel   Result Value Ref Range    Sodium 143 133 - 144 mmol/L    Potassium 3.5 3.4 - 5.3 mmol/L    Chloride 104 94 - 109 mmol/L    Carbon Dioxide 25 20 - 32 mmol/L    Anion Gap 14 3 - 14 mmol/L    Glucose 179 (H) 70 - 99 mg/dL    Urea Nitrogen 30 7 - 30 mg/dL    Creatinine 1.41 (H) 0.52 - 1.04 mg/dL    GFR Estimate 36 (L) >60 mL/min/1.7m2    GFR Estimate If Black 44 (L) >60 mL/min/1.7m2    Calcium 9.4 8.5 - 10.1 mg/dL    Bilirubin Total 0.8 0.2 - 1.3 mg/dL    Albumin 3.6 3.4 - 5.0 g/dL    Protein Total 7.4 6.8 - 8.8 g/dL    Alkaline Phosphatase 79 40 - 150 U/L    ALT 29 0 - 50 U/L    AST 16 0 - 45 U/L   Lipase   Result Value Ref Range    Lipase 171 73 - 393 U/L   Lactic acid whole blood   Result Value Ref Range    Lactic Acid 3.6 (H) 0.7 - 2.0 mmol/L   CRP inflammation   Result Value Ref Range    CRP Inflammation 24.4 (H) 0.0 - 8.0 mg/L          Labs Ordered and Resulted from Time of ED Arrival Up to the Time of Departure from the ED   CBC WITH PLATELETS DIFFERENTIAL - Abnormal; Notable for the following:        Result Value    WBC 17.0 (*)     Absolute Neutrophil 14.4 (*)     Absolute Monocytes 1.6 (*)     All other components within normal limits   COMPREHENSIVE METABOLIC PANEL - Abnormal; Notable for the following:     Glucose 179 (*)     Creatinine 1.41 (*)     GFR Estimate 36 (*)     GFR Estimate If Black 44 (*)     All other components within normal limits   LACTIC ACID WHOLE BLOOD - Abnormal; Notable for the following:     Lactic Acid 3.6 (*)     All  other components within normal limits   CRP INFLAMMATION - Abnormal; Notable for the following:     CRP Inflammation 24.4 (*)     All other components within normal limits   LIPASE   UA MACROSCOPIC WITH REFLEX TO MICRO AND CULTURE   PERIPHERAL IV CATHETER   NURSING DRAW AND HOLD       Assessments & Plan (with Medical Decision Making)    Please see the ED course above for her evaluation. At shift change, Dr. Richards has agreed to accept her care. I have updated him regarding her condition. We do not find any current source for infection, and we are concerned that the elevated lactate level is possibly related to dehydration. She is receiving her second liter of IV fluid normal saline rehydration at this time. Given no source for infection identified, IV antibiotics have not been initiated. She has had resolution of her abdominal pain after having large loose bowel movements in the ED. Chest x-ray negative for infiltrate currently. We will repeat the lactate and CBC in 2 hours to follow the trend.   I did discuss with the patient's daughter that if insurance will not cover the Protonix, that they need to buy OTC omeprazole 20 mg and take this twice daily. I think this is crucial to treat her reflux symptoms, as this could be a component for her cough as well.      I have reviewed the nursing notes.    I have reviewed the findings, diagnosis, plan and need for follow up with the patient.       New Prescriptions    No medications on file       Final diagnoses:   None       Disclaimer: This note consists of symbols derived from keyboarding, dictation and/or voice recognition software. As a result, there may be errors in the script that have gone undetected. Please consider this when interpreting information found in this chart.      9/9/2017   Malcolm Zuluaga PA-C   [facility] EMERGENCY DEPARTMENT     Malcolm Zuluaga PA-C  09/10/17 0115       Malcolm Zuluaga PA-C  09/10/17 0116

## 2017-09-10 NOTE — ED NOTES
ED SIGNOUT Note    CC:    Cough, abdominal pain, dehydration       Sign-out received from Malcolm Zuluaga PA-C  HPI: Mya Hui is a 77 year old female seen in the emergency department and signed out to me at shift change. Please refer to the ED provider note.  Patient was signed out to me pending repeat lactic acid level.  She has been sick for the last 3 days with cough, and 1-2 days of abdominal pain, nausea and vomiting.  Patient was hospitalized from September 3 through the 5th due to persistent nausea and vomiting, thought to be related to her hiatal hernia and severe gastroesophageal reflux.  She was discharged home Wednesday, but her Protonix was not approved by her insurance, and she has not taken any for the last several days.  She started to develop a cough on Thursday that has been nonproductive.  The cough at times is been extremely harsh but not associated with fever.  Patient has not had a bowel movement for the past week, and started to have abdominal pain, nausea and vomiting again last night.  She was seen at urgent care today, and started on Zithromax for suspected bronchitis.  This afternoon, she felt clammy and weak.  She is accompanied by 2 family members.        Medical records reviewed     Physical Exam: Please refer to Malcolm Zuluaga's note  Initial vitals were reviewed  Last vitals: Blood pressure 112/68, pulse 88, temperature 98.1  F (36.7  C), temperature source Oral, resp. rate 16, SpO2 94 %.  GENERAL APPEARANCE: Patient appears weak, pale  EYES: PERRL;   HENT: The case membranes are extremely dry still  NECK: no adenopathy or masses  RESP: normal respiratory effort; no adventitious breath sounds  CV: regular rate and rhythm, no significant murmurs or rubs  ABDOMEN: soft, mild left sided abdominal tenderness, no rebound or guarding  MS: no gross abnormality, normal tone and strength  EXT: no pedal edema; no calf tendenress  SKIN: no petechia or rash; dry  NEURO/PSYCH: Mood/affect,  speech and thought were judged to be normal      Labs/Imaging:  Results for orders placed or performed during the hospital encounter of 09/09/17 (from the past 24 hour(s))   CBC with platelets differential   Result Value Ref Range    WBC 17.0 (H) 4.0 - 11.0 10e9/L    RBC Count 4.70 3.8 - 5.2 10e12/L    Hemoglobin 13.3 11.7 - 15.7 g/dL    Hematocrit 41.4 35.0 - 47.0 %    MCV 88 78 - 100 fl    MCH 28.3 26.5 - 33.0 pg    MCHC 32.1 31.5 - 36.5 g/dL    RDW 14.9 10.0 - 15.0 %    Platelet Count 246 150 - 450 10e9/L    Diff Method Automated Method     % Neutrophils 84.6 %    % Lymphocytes 5.5 %    % Monocytes 9.2 %    % Eosinophils 0.6 %    % Basophils 0.1 %    Absolute Neutrophil 14.4 (H) 1.6 - 8.3 10e9/L    Absolute Lymphocytes 0.9 0.8 - 5.3 10e9/L    Absolute Monocytes 1.6 (H) 0.0 - 1.3 10e9/L    Absolute Eosinophils 0.1 0.0 - 0.7 10e9/L    Absolute Basophils 0.0 0.0 - 0.2 10e9/L   Comprehensive metabolic panel   Result Value Ref Range    Sodium 143 133 - 144 mmol/L    Potassium 3.5 3.4 - 5.3 mmol/L    Chloride 104 94 - 109 mmol/L    Carbon Dioxide 25 20 - 32 mmol/L    Anion Gap 14 3 - 14 mmol/L    Glucose 179 (H) 70 - 99 mg/dL    Urea Nitrogen 30 7 - 30 mg/dL    Creatinine 1.41 (H) 0.52 - 1.04 mg/dL    GFR Estimate 36 (L) >60 mL/min/1.7m2    GFR Estimate If Black 44 (L) >60 mL/min/1.7m2    Calcium 9.4 8.5 - 10.1 mg/dL    Bilirubin Total 0.8 0.2 - 1.3 mg/dL    Albumin 3.6 3.4 - 5.0 g/dL    Protein Total 7.4 6.8 - 8.8 g/dL    Alkaline Phosphatase 79 40 - 150 U/L    ALT 29 0 - 50 U/L    AST 16 0 - 45 U/L   Lipase   Result Value Ref Range    Lipase 171 73 - 393 U/L   Lactic acid whole blood   Result Value Ref Range    Lactic Acid 3.6 (H) 0.7 - 2.0 mmol/L   CRP inflammation   Result Value Ref Range    CRP Inflammation 24.4 (H) 0.0 - 8.0 mg/L   XR Chest 2 Views    Narrative    XR CHEST 2 VW  9/10/2017 12:43 AM      HISTORY: Cough.     COMPARISON: 9/3/2017.    FINDINGS: The heart size is normal without pulmonary edema.  The  thoracic aorta is calcified. There is a large hiatal hernia. The lungs  are clear. No pneumothorax or pleural effusion.      Impression    IMPRESSION: No acute abnormality.   CBC with platelets differential   Result Value Ref Range    WBC 15.5 (H) 4.0 - 11.0 10e9/L    RBC Count 3.92 3.8 - 5.2 10e12/L    Hemoglobin 11.1 (L) 11.7 - 15.7 g/dL    Hematocrit 35.0 35.0 - 47.0 %    MCV 89 78 - 100 fl    MCH 28.3 26.5 - 33.0 pg    MCHC 31.7 31.5 - 36.5 g/dL    RDW 14.7 10.0 - 15.0 %    Platelet Count 199 150 - 450 10e9/L    Diff Method Automated Method     % Neutrophils 83.0 %    % Lymphocytes 5.1 %    % Monocytes 11.5 %    % Eosinophils 0.3 %    % Basophils 0.1 %    Absolute Neutrophil 12.9 (H) 1.6 - 8.3 10e9/L    Absolute Lymphocytes 0.8 0.8 - 5.3 10e9/L    Absolute Monocytes 1.8 (H) 0.0 - 1.3 10e9/L    Absolute Eosinophils 0.0 0.0 - 0.7 10e9/L    Absolute Basophils 0.0 0.0 - 0.2 10e9/L   Lactic acid whole blood   Result Value Ref Range    Lactic Acid 2.6 (H) 0.7 - 2.0 mmol/L         IMPRESSION:   Final diagnoses:   Cough   Dehydration   Elevated lactic acid level       ED COURSE/MEDICAL DECISION MAKING  Mya Hui is a 77 year old female signed out to me at the change of shift.  Patient with elevated lactic acid level and white blood count, presenting with 3 day history of cough that is at times harsh and for the most part nonproductive.  She does not have been associated fever.  Patient has a high will lactic acid level of 3.6, and white blood count of 17.0 with 85% neutrophils.  Chest x-ray did not reveal an obvious infiltrate, there is a large hiatal hernia.  Patient received 2 L of normal saline, and her lactic acid level was repeated and was still elevated at 2.6.  The patient may have a respiratory infection, but does not appear to have sepsis.  Her elevated lactic acid level is suspected to be due to severe dehydration.  The patient is still feeling weak and dehydrated even after 2 L of normal saline.   Her abdominal pain has almost completely resolved after having a large bowel movement.  I discussed the case with Dr. Seth and we will register the patient to observation for additional IV fluids and close monitoring for the possibility of early sepsis.    Lactate is greater than 2 due to suspected severe dehydration, at this time there is no sign of sepsis.         Lucila Richards MD  09/10/17 [id number]

## 2017-09-10 NOTE — PROGRESS NOTES
[facility]    Sepsis Evaluation Progress Note    Date of Service: 09/10/2017    I was called to see Mya Hui due to abnormal vital signs triggering the Sepsis SIRS screening alert. She is not known to have an infection.     Physical Exam    Vital Signs:  Temp: 100.2  F (37.9  C) Temp src: Oral BP: 132/49 Pulse: 82   Resp: 18 SpO2: 97 % O2 Device: None (Room air)      Lab:  Lactic Acid   Date Value Ref Range Status   09/10/2017 2.6 (H) 0.7 - 2.0 mmol/L Final     Comment:     Significant value called to and read back by   SHELBY HOFF BY TS @ 0742         The patient is at baseline mental status.    The rest of their physical exam is significant for normal heart, lung and abdominal exam.    Assessment and Plan    The SIRS and exam findings are likely due to dehydration, there is no sign of sepsis at this time.  She says her abdominal pain is much improved.  She denies SOB or any urinary symptoms.      Disposition: The patient will remain on the current unit. We will continue to monitor this patient closely.  Will bolus with lactic acid and recheck lactic acid in two hours instead of mid day today.    Giovany Seth MD, MD

## 2017-09-10 NOTE — PROGRESS NOTES
S-(situation): Patient registered to Observation. Patient arrived to room 269 via cart from ed    B-(background): Nausea Vomiting Abdominal pain    A-(assessment): Temp: 100.2  F (37.9  C) Temp src: Oral BP: 132/49 Pulse: 82   Resp: 18 SpO2: 97 % O2 Device: None (Room air)  A/O Resp even et unlabored. Pt is dyspneic on exertion. Denies pain Denies nausea at this time. BS present. No abd tenderness reported. No signs of distress noted.     R-(recommendations): Orders and observation goals reviewed with Pt    Nursing Observation criteria listed below was met:    Skin issues/needs documented:NA  Isolation needs addressed, if appropriate: NA  Fall Prevention: Education given and documented: Yes  Education Assessment documented:Yes  Education Documented (Pre-existing chronic infection such as, MRSA/VRE need education on admission): Yes  New medication patient education completed and documented (Possible Side Effects of Common Medications handout): Yes  Home medications if not able to send immediately home with family stored here: NA  Reminder note placed in discharge instructions: NA  Patient has discharge needs (If yes, please explain): No

## 2017-09-10 NOTE — ED NOTES
DATE:  9/10/2017   TIME OF RECEIPT FROM LAB:  0036  LAB TEST:  Lactic acid  LAB VALUE:  3.6  RESULTS GIVEN WITH READ-BACK TO (PROVIDER):  Malcolm LEONARD and primary RN  TIME LAB VALUE REPORTED TO PROVIDER:   0036

## 2017-09-10 NOTE — ED NOTES
Pt reports pain in low abdomen, she has not had a good bowel movement since last Sunday when she was hospitalized with hiatal hernia, she is nauseated and has had several emesis today, Zofran and Fluids given by EMS prior to arrival and Saline Lock in place

## 2017-09-10 NOTE — H&P
[facility]    History and Physical  Hospitalist       Date of Admission:  9/9/2017  Date of Service (when I saw the patient): 09/10/17    Assessment & Plan       Active Problems:    Nausea and vomiting in adult    Assessment: likely secondary to constipation.  She had only one small bowel movement over the past week but in the ED had several large bowel movements and now her nausea is significantly improved.  She also had abdominal pain at the time and that also improved significantly.  Due to the vomiting and poor oral intake over the last few days she has become dehydrated as evidenced by an elevated lactic acid, LISA and lower than normal BP.     Plan: register to observation, clear liquid diet, antiemetics as needed, ongoing IV fluids, follow intake and output, repeat lactic acid and basic profile in 8 hours      Dehydration    Assessment: as above    Plan: as above      Elevated lactic acid level    Assessment: likely due to volume depletion.  No signs of infection.  Has had a cough but no evidence of pneumonia.  No urinary symptoms and her abdominal pain has nearly resolved since having a bowel movement so at this point no evidence of significant infection at this time.  Her lactic acid is improving with fluids    Plan: continue IV fluids, recheck lactic acid, monitor WBC and temps      Constipation    Assessment: has now had several good bowel movements.  Is on several meds including diltiazem, oxybutynin and amitriptyline which can all cause constipation but none of these are new    Plan: monitor for now      Acute kidney injury (H)    Assessment: due to volume depletion    Plan: hydrate and recheck      Leucocytosis    Assessment: likely due to hemoconcentration.  Improving with fluids alone    Plan: follow      Cough    Assessment: mild and was given zithromax but will stop antibiotic     Plan: monitor      GERD (gastroesophageal reflux disease)    Assessment: worsening  symptoms off of her PPI    Plan: resume PPI      Essential hypertension with goal blood pressure less than 140/90    Assessment: BP below normal for her    Plan: hold meds until her pressures improve      Hypothyroidism, unspecified type    Assessment: chronic with normal TSH last month    Plan: resume replacement at discharge      Type 2 diabetes mellitus without complication, without long-term current use of insulin (H)    Assessment: well controlled    Plan: ada diet and monitor sugars.  Hold metformin for now    DVT Prophylaxis: anticipate less than 24 hour stay  Code Status: DNR / DNI    Disposition: Expected discharge in 1 days once labs improving and tolerating advancing diet.    Giovany Seth MD    Primary Care Physician   Chanell Nelson    Chief Complaint   Abdominal pain    History is obtained from the patient    History of Present Illness   Mya Hui is a 77 year old female who presents with abdominal pain, constipation, nausea and vomiting.  She was hospitalized one week ago with nausea and vomiting but felt improved when discharged.  She started developing a mild cough about 4 days ago.  She has had only one small bowel movement in the last week. She started getting LLQ abdominal pain yesterday and her oral intake was poor starting yesterday.  She then started vomiting and has been vomiting fairly frequently.  She then presented to the ED.  While in the ED she had several large bowel movements and now her abdominal pain and nausea are significantly improved but still shows signs of volume depletion so is registered to observation.     Past Medical History    I have reviewed this patient's medical history and updated it with pertinent information if needed.   Past Medical History:   Diagnosis Date     Arthritis      Diabetes type 2, controlled (H)      GERD (gastroesophageal reflux disease)      History of renal calculi      HTN, goal below 140/90      Hyperlipidaemia LDL goal < 100       Hypothyroid      Insomnia      Left carotid stenosis      Migraine      Sciatica of right side 6/28/2013       Past Surgical History   I have reviewed this patient's surgical history and updated it with pertinent information if needed.  Past Surgical History:   Procedure Laterality Date     BUNIONECTOMY      Right foot     CATARACT IOL, RT/LT Bilateral 2017     COLONOSCOPY  7/12/2013    Procedure: COLONOSCOPY;  Colonoscopy;  Surgeon: Giovany Espinoza MD;  Location: [facility]     FRACTURE TX, ANKLE RT/LT      Left ankle     HC CYSTOURETHROSCOPY W/ URETEROSCOPY &/OR PYELOSCOPY; W/ LITHOTRIPSY       HC REVISE MEDIAN N/CARPAL TUNNEL SURG      Right wrist     INJECT JOINT SACROILIAC  4/10/2014    Procedure: INJECT JOINT SACROILIAC;  Sacroiliac Joint Injection;  Surgeon: Gilbert Mcclure MD;  Location: [facility]       Prior to Admission Medications   Prior to Admission Medications   Prescriptions Last Dose Informant Patient Reported? Taking?   Multiple Vitamins-Minerals (MULTIVITAL PO) 9/8/2017 at 0800  Yes Yes   Sig: Take  by mouth. daily   acetaminophen (TYLENOL) 325 MG tablet Unknown at Unknown time  Yes No   Sig: Take 2 tablets (650 mg) by mouth every 4 hours as needed for mild pain   alum & mag hydroxide-simethicone (MYLANTA ES/MAALOX  ES) 400-400-40 MG/5ML SUSP suspension 9/5/2017  Yes No   Sig: Take 30 mLs by mouth every 4 hours as needed for indigestion   amitriptyline (ELAVIL) 25 MG tablet 9/8/2017 at 2200  No Yes   Sig: Take 1 tablet (25 mg) by mouth At Bedtime   aspirin 81 MG chewable tablet 9/9/2017 at 0800  No Yes   Sig: Take 1 tablet (81 mg) by mouth daily   blood glucose (NO BRAND SPECIFIED) lancets standard   No No   Sig: Glucose test strips  Use to test blood sugar 1 times daily or as directed.   blood glucose monitoring (NO BRAND SPECIFIED) test strip   No No   Sig: Use to test blood sugars 1 times daily or as directed   diltiazem 120 MG 24 hr capsule 9/8/2017 at 0800  No Yes   Sig: Take 1 capsule (120 mg)  by mouth daily   levothyroxine (SYNTHROID/LEVOTHROID) 50 MCG tablet 9/8/2017 at 0800  No Yes   Sig: Take 1 tablet (50 mcg) by mouth daily   lisinopril-hydrochlorothiazide (PRINZIDE/ZESTORETIC) 20-12.5 MG per tablet 9/8/2017 at 2200  No Yes   Sig: Take 1 tablet by mouth 2 times daily   metFORMIN (GLUCOPHAGE-XR) 500 MG 24 hr tablet 9/8/2017 at 0800  Yes Yes   Sig: For the next few days, continue to take your 2 tabs every evening with meals but hold your morning dose until your appetite returns to normal.  Then resume: ONE TABLET BY MOUTH EVERY MORNING AND TAKE TWO TABLETS BY MOUTH EVERY EVENING WITH MEALS   oxybutynin (DITROPAN-XL) 5 MG 24 hr tablet 9/8/2017 at 0800  Yes Yes   Sig: Take by mouth daily   pantoprazole (PROTONIX) 40 MG EC tablet 0800 at Unknown time  No Yes   Sig: Take 1 tablet (40 mg) by mouth 2 times daily (before meals)   potassium chloride (K-TAB,KLOR-CON) 10 MEQ tablet 9/8/2017 at 0800  No Yes   Sig: TAKE ONE TABLET BY MOUTH ONCE DAILY   pravastatin (PRAVACHOL) 40 MG tablet 9/8/2017 at 2200  No Yes   Sig: TAKE ONE TABLET BY MOUTH ONCE DAILY      Facility-Administered Medications: None     Allergies   Allergies   Allergen Reactions     Codeine Nausea     Fentanyl Nausea and Vomiting     VERY sensitive to most narcotics if not all.       Social History   I have reviewed this patient's social history and updated it with pertinent information if needed. Mya Hui  reports that she has never smoked. She has never used smokeless tobacco. She reports that she drinks alcohol. She reports that she does not use illicit drugs.    Family History   I have reviewed this patient's family history and updated it with pertinent information if needed.   Family History   Problem Relation Age of Onset     Hypertension Brother      CEREBROVASCULAR DISEASE Brother      DIABETES Father      Cardiovascular Father      DIABETES Brother      Borderline     Breast Cancer Mother      DIABETES Brother      Blood Disease  Brother      Cardiovascular Brother      MI       Review of Systems   The 10 point Review of Systems is negative other than noted in the HPI or here.     Physical Exam   Temp: 98.1  F (36.7  C) Temp src: Oral BP: 112/68 Pulse: 88   Resp: 16 SpO2: 94 % O2 Device: None (Room air)    Vital Signs with Ranges  Temp:  [98.1  F (36.7  C)] 98.1  F (36.7  C)  Pulse:  [88] 88  Resp:  [16] 16  BP: (112-129)/() 112/68  SpO2:  [76 %-94 %] 94 %  0 lbs 0 oz    Constitutional:   awake, alert, cooperative, no apparent distress, and appears stated age     Eyes:   Lids and lashes normal, pupils equal, round and reactive to light, extra ocular muscles intact, sclera clear, conjunctiva normal     ENT:   Normocephalic, without obvious abnormality, atraumatic, sinuses nontender on palpation, external ears without lesions, oral pharynx with moist mucous membranes, tonsils without erythema or exudates, gums normal and good dentition.     Neck:   Supple, symmetrical, trachea midline, no adenopathy, thyroid symmetric, not enlarged and no tenderness, skin normal     Hematologic / Lymphatic:   no cervical lymphadenopathy and no supraclavicular lymphadenopathy     Back:   Symmetric, no curvature, spinous processes are non-tender on palpation, paraspinous muscles are non-tender on palpation, no costal vertebral tenderness     Lungs:   No increased work of breathing, good air exchange, clear to auscultation bilaterally, no crackles or wheezing     Cardiovascular:   Normal apical impulse, regular rate and rhythm, normal S1 and S2, no S3 or S4, and no murmur noted     Abdomen:   normal bowel sounds, soft, non-distended, non-tender, no masses palpated, no hepatosplenomegally     Neurologic:   Awake, alert, oriented to name, place and time.  Cranial nerves II-XII are grossly intact.  Motor is 5 out of 5 bilaterally.    Sensory is intact.        Data   Data reviewed today:  I personally reviewed no images or EKG's today.    Recent Labs  Lab  09/10/17  0220 09/10/17  0030 09/05/17  0620 09/04/17  0800 09/04/17  0510 09/03/17  2035   WBC 15.5* 17.0*  --   --  8.6 8.2   HGB 11.1* 13.3  --   --  10.4* 10.9*   MCV 89 88  --   --  86 86    246  --   --  227 228   NA  --  143 146*  --  143 141   POTASSIUM  --  3.5 4.4 4.2 3.8 3.3*   CHLORIDE  --  104 110*  --  107 103   CO2  --  25 27  --  26 27   BUN  --  30 17  --  21 29   CR  --  1.41* 0.81  --  0.75 0.84   ANIONGAP  --  14 9  --  10 11   TERESA  --  9.4 8.5  --  8.2* 8.6   GLC  --  179* 115*  --  112* 129*   ALBUMIN  --  3.6  --   --   --  3.4   PROTTOTAL  --  7.4  --   --   --  6.7*   BILITOTAL  --  0.8  --   --   --  0.4   ALKPHOS  --  79  --   --   --  48   ALT  --  29  --   --   --  29   AST  --  16  --   --   --  17   LIPASE  --  171  --   --   --  156   TROPI  --   --   --   --   --  0.030       Recent Results (from the past 24 hour(s))   XR Chest 2 Views    Narrative    XR CHEST 2 VW  9/10/2017 12:43 AM      HISTORY: Cough.     COMPARISON: 9/3/2017.    FINDINGS: The heart size is normal without pulmonary edema. The  thoracic aorta is calcified. There is a large hiatal hernia. The lungs  are clear. No pneumothorax or pleural effusion.      Impression    IMPRESSION: No acute abnormality.

## 2017-09-10 NOTE — ED NOTES
Patient had a x-large incontinent diarrhea stool.  Staff of assist of 2 for complete bed change and ramona care of patient completed.  While patient was being turned side to side for cleaning she did have a small incontinent formed stool.

## 2017-09-10 NOTE — ED NOTES
Pt has no nasuea at this time and is wanting to drink at this time, she was on commode and had large liquid stool with large chunks of poop as well

## 2017-09-11 VITALS
OXYGEN SATURATION: 94 % | SYSTOLIC BLOOD PRESSURE: 133 MMHG | DIASTOLIC BLOOD PRESSURE: 71 MMHG | RESPIRATION RATE: 18 BRPM | TEMPERATURE: 98.1 F | BODY MASS INDEX: 33.24 KG/M2 | HEIGHT: 65 IN | HEART RATE: 77 BPM | WEIGHT: 199.52 LBS

## 2017-09-11 PROBLEM — K44.9 SLIDING HIATAL HERNIA: Status: ACTIVE | Noted: 2017-09-11

## 2017-09-11 PROBLEM — K80.20 CALCULUS OF GALLBLADDER WITHOUT CHOLECYSTITIS: Status: ACTIVE | Noted: 2017-09-11

## 2017-09-11 LAB
ANION GAP SERPL CALCULATED.3IONS-SCNC: 7 MMOL/L (ref 3–14)
BUN SERPL-MCNC: 12 MG/DL (ref 7–30)
CALCIUM SERPL-MCNC: 8 MG/DL (ref 8.5–10.1)
CHLORIDE SERPL-SCNC: 109 MMOL/L (ref 94–109)
CO2 SERPL-SCNC: 27 MMOL/L (ref 20–32)
CREAT SERPL-MCNC: 0.97 MG/DL (ref 0.52–1.04)
GFR SERPL CREATININE-BSD FRML MDRD: 56 ML/MIN/1.7M2
GLUCOSE BLDC GLUCOMTR-MCNC: 108 MG/DL (ref 70–99)
GLUCOSE BLDC GLUCOMTR-MCNC: 110 MG/DL (ref 70–99)
GLUCOSE SERPL-MCNC: 153 MG/DL (ref 70–99)
POTASSIUM SERPL-SCNC: 3.3 MMOL/L (ref 3.4–5.3)
SODIUM SERPL-SCNC: 143 MMOL/L (ref 133–144)
WBC # BLD AUTO: 8.9 10E9/L (ref 4–11)

## 2017-09-11 PROCEDURE — A9270 NON-COVERED ITEM OR SERVICE: HCPCS | Mod: GY | Performed by: INTERNAL MEDICINE

## 2017-09-11 PROCEDURE — G0378 HOSPITAL OBSERVATION PER HR: HCPCS

## 2017-09-11 PROCEDURE — 00000146 ZZHCL STATISTIC GLUCOSE BY METER IP

## 2017-09-11 PROCEDURE — 27210995 ZZH RX 272: Performed by: FAMILY MEDICINE

## 2017-09-11 PROCEDURE — 96361 HYDRATE IV INFUSION ADD-ON: CPT

## 2017-09-11 PROCEDURE — 99217 ZZC OBSERVATION CARE DISCHARGE: CPT | Performed by: PEDIATRICS

## 2017-09-11 PROCEDURE — 85048 AUTOMATED LEUKOCYTE COUNT: CPT | Performed by: FAMILY MEDICINE

## 2017-09-11 PROCEDURE — 25000132 ZZH RX MED GY IP 250 OP 250 PS 637: Mod: GY | Performed by: INTERNAL MEDICINE

## 2017-09-11 PROCEDURE — 36415 COLL VENOUS BLD VENIPUNCTURE: CPT | Performed by: FAMILY MEDICINE

## 2017-09-11 PROCEDURE — 80048 BASIC METABOLIC PNL TOTAL CA: CPT | Performed by: FAMILY MEDICINE

## 2017-09-11 RX ORDER — POTASSIUM CL/LIDO/0.9 % NACL 10MEQ/0.1L
10 INTRAVENOUS SOLUTION, PIGGYBACK (ML) INTRAVENOUS
Status: DISCONTINUED | OUTPATIENT
Start: 2017-09-11 | End: 2017-09-11 | Stop reason: HOSPADM

## 2017-09-11 RX ORDER — POTASSIUM CHLORIDE 750 MG/1
10 TABLET, EXTENDED RELEASE ORAL 3 TIMES DAILY
Qty: 90 TABLET | Refills: 0 | Status: SHIPPED | OUTPATIENT
Start: 2017-09-11 | End: 2017-11-06

## 2017-09-11 RX ORDER — POTASSIUM CHLORIDE 29.8 MG/ML
20 INJECTION INTRAVENOUS
Status: DISCONTINUED | OUTPATIENT
Start: 2017-09-11 | End: 2017-09-11

## 2017-09-11 RX ORDER — METFORMIN HCL 500 MG
TABLET, EXTENDED RELEASE 24 HR ORAL
Qty: 270 TABLET | Refills: 2 | COMMUNITY
Start: 2017-09-11 | End: 2018-04-03

## 2017-09-11 RX ORDER — BENZONATATE 100 MG/1
100 CAPSULE ORAL 3 TIMES DAILY PRN
Status: DISCONTINUED | OUTPATIENT
Start: 2017-09-11 | End: 2017-09-11 | Stop reason: HOSPADM

## 2017-09-11 RX ORDER — POTASSIUM CHLORIDE 1500 MG/1
20-40 TABLET, EXTENDED RELEASE ORAL
Status: DISCONTINUED | OUTPATIENT
Start: 2017-09-11 | End: 2017-09-11 | Stop reason: HOSPADM

## 2017-09-11 RX ORDER — POTASSIUM CHLORIDE 1.5 G/1.58G
20-40 POWDER, FOR SOLUTION ORAL
Status: DISCONTINUED | OUTPATIENT
Start: 2017-09-11 | End: 2017-09-11 | Stop reason: HOSPADM

## 2017-09-11 RX ORDER — IBUPROFEN 400 MG/1
400 TABLET, FILM COATED ORAL ONCE
Status: COMPLETED | OUTPATIENT
Start: 2017-09-11 | End: 2017-09-11

## 2017-09-11 RX ORDER — LISINOPRIL AND HYDROCHLOROTHIAZIDE 12.5; 2 MG/1; MG/1
1 TABLET ORAL 2 TIMES DAILY
Qty: 60 TABLET | Refills: 0 | COMMUNITY
Start: 2017-09-11 | End: 2017-10-24

## 2017-09-11 RX ORDER — POTASSIUM CHLORIDE 7.45 MG/ML
10 INJECTION INTRAVENOUS
Status: DISCONTINUED | OUTPATIENT
Start: 2017-09-11 | End: 2017-09-11 | Stop reason: HOSPADM

## 2017-09-11 RX ORDER — POTASSIUM CHLORIDE 750 MG/1
10 TABLET, EXTENDED RELEASE ORAL 3 TIMES DAILY
Qty: 90 TABLET | Refills: 3 | COMMUNITY
Start: 2017-09-11 | End: 2017-09-11

## 2017-09-11 RX ORDER — PANTOPRAZOLE SODIUM 40 MG/1
40 TABLET, DELAYED RELEASE ORAL EVERY MORNING
Qty: 30 TABLET | Refills: 0 | Status: SHIPPED | OUTPATIENT
Start: 2017-09-11 | End: 2017-09-14 | Stop reason: DRUGHIGH

## 2017-09-11 RX ADMIN — BENZONATATE 100 MG: 100 CAPSULE, LIQUID FILLED ORAL at 03:09

## 2017-09-11 RX ADMIN — POTASSIUM CHLORIDE 20 MEQ: 1500 TABLET, EXTENDED RELEASE ORAL at 10:03

## 2017-09-11 RX ADMIN — PANTOPRAZOLE SODIUM 40 MG: 40 TABLET, DELAYED RELEASE ORAL at 07:34

## 2017-09-11 RX ADMIN — IBUPROFEN 400 MG: 400 TABLET ORAL at 00:45

## 2017-09-11 RX ADMIN — SODIUM CHLORIDE: 4.5 INJECTION, SOLUTION INTRAVENOUS at 02:17

## 2017-09-11 RX ADMIN — POTASSIUM CHLORIDE 40 MEQ: 1500 TABLET, EXTENDED RELEASE ORAL at 07:34

## 2017-09-11 ASSESSMENT — ACTIVITIES OF DAILY LIVING (ADL)
AMBULATION: 0-->INDEPENDENT
TRANSFERRING: 0-->INDEPENDENT
COGNITION: 0 - NO COGNITION ISSUES REPORTED
FALL_HISTORY_WITHIN_LAST_SIX_MONTHS: NO
TOILETING: 0-->INDEPENDENT
RETIRED_COMMUNICATION: 0-->UNDERSTANDS/COMMUNICATES WITHOUT DIFFICULTY
RETIRED_EATING: 0-->INDEPENDENT
DRESS: 0-->INDEPENDENT
SWALLOWING: 0-->SWALLOWS FOODS/LIQUIDS WITHOUT DIFFICULTY
BATHING: 0-->INDEPENDENT

## 2017-09-11 NOTE — PLAN OF CARE
Problem: Goal Outcome Summary  Goal: Goal Outcome Summary  Outcome: Improving  K+ 3.3 this am, MD notified and potassium replacement initiated.  Pt has a frequent NPC that kept her awake overnight.  Recliner placed in room and tessalon pearls ordered.  Due to frequent cough, pt incontinent of urine - brief on.  VSS.  Up with SBA of 1 to BR.  AO - uses call light appropriately.  Blood sugar at hs 122, no sliding scale needed.  Fine crackles noted in bases, IV fluids discontinued.

## 2017-09-11 NOTE — ADDENDUM NOTE
Encounter addended by: Nayeli Locke, PT on: 9/11/2017  7:42 AM<BR>     Actions taken: Sign clinical note

## 2017-09-11 NOTE — DISCHARGE SUMMARY
[facility] Observation Discharge Summary    Mya Hui MRN# [id number]   Age: 77 year old YOB: 1940     Date of Observation:  9/9/2017  Date of Discharge:  9/11/2017   Initial Physician:  Giovany Seth MD  Discharge Physician:  Allen Ko MD     Home clinic: [facility]  Primary care provider: Chanell Nelson          Admission Diagnoses:   Cough [R05]  Dehydration [E86.0]  Elevated lactic acid level [R79.89]          Discharge Diagnoses:   Principal diagnosis: Nausea and vomiting in adult    Secondary diagnoses:    Nausea and vomiting in adult    Dehydration    GERD (gastroesophageal reflux disease)    Type 2 diabetes mellitus without complication, without long-term current use of insulin (H)    Elevated lactic acid level    Leucocytosis    Constipation    Acute kidney injury (H)    Sliding hiatal hernia    Calculus of gallbladder without cholecystitis    Essential hypertension with goal blood pressure less than 140/90    Hypothyroidism, unspecified type    Cough    * No resolved hospital problems. *            Procedures:   No procedures performed during this hospital stay           Discharge Medications:     Outpatient Prescriptions Marked as Taking for the 9/9/17 encounter (Hospital Encounter)   Medication Sig     metFORMIN (GLUCOPHAGE-XR) 500 MG 24 hr tablet ONE TABLET BY MOUTH EVERY MORNING AND TAKE TWO TABLETS BY MOUTH EVERY EVENING WITH MEALS     pantoprazole (PROTONIX) 40 MG EC tablet Take 1 tablet (40 mg) by mouth every morning     lisinopril-hydrochlorothiazide (PRINZIDE/ZESTORETIC) 20-12.5 MG per tablet Take 1 tablet by mouth 2 times daily Do not restart until 9/12/17     omeprazole (PRILOSEC) 20 MG CR capsule Take 1 capsule (20 mg) by mouth every evening     potassium chloride (K-TAB,KLOR-CON) 10 MEQ tablet Take 1 tablet (10 mEq) by mouth 3 times daily     oxybutynin (DITROPAN-XL) 5 MG 24 hr tablet Take by mouth daily     pravastatin (PRAVACHOL)  40 MG tablet TAKE ONE TABLET BY MOUTH ONCE DAILY     aspirin 81 MG chewable tablet Take 1 tablet (81 mg) by mouth daily     levothyroxine (SYNTHROID/LEVOTHROID) 50 MCG tablet Take 1 tablet (50 mcg) by mouth daily     diltiazem 120 MG 24 hr capsule Take 1 capsule (120 mg) by mouth daily     amitriptyline (ELAVIL) 25 MG tablet Take 1 tablet (25 mg) by mouth At Bedtime     Multiple Vitamins-Minerals (MULTIVITAL PO) Take  by mouth. daily       Current Discharge Medication List      START taking these medications    Details   omeprazole (PRILOSEC) 20 MG CR capsule Take 1 capsule (20 mg) by mouth every evening  Qty: 30 capsule, Refills: 1    Associated Diagnoses: Gastroesophageal reflux disease, esophagitis presence not specified         CONTINUE these medications which have CHANGED    Details   metFORMIN (GLUCOPHAGE-XR) 500 MG 24 hr tablet ONE TABLET BY MOUTH EVERY MORNING AND TAKE TWO TABLETS BY MOUTH EVERY EVENING WITH MEALS  Qty: 270 tablet, Refills: 2    Associated Diagnoses: Type 2 diabetes mellitus without complication, without long-term current use of insulin (H)      pantoprazole (PROTONIX) 40 MG EC tablet Take 1 tablet (40 mg) by mouth every morning  Qty: 30 tablet, Refills: 0    Associated Diagnoses: Gastroesophageal reflux disease, esophagitis presence not specified; Hiatal hernia      lisinopril-hydrochlorothiazide (PRINZIDE/ZESTORETIC) 20-12.5 MG per tablet Take 1 tablet by mouth 2 times daily Do not restart until 9/12/17  Qty: 60 tablet, Refills: 0    Associated Diagnoses: Type 2 diabetes mellitus without complication, without long-term current use of insulin (H)      potassium chloride (K-TAB,KLOR-CON) 10 MEQ tablet Take 1 tablet (10 mEq) by mouth 3 times daily  Qty: 90 tablet, Refills: 0    Associated Diagnoses: Hypokalemia         CONTINUE these medications which have NOT CHANGED    Details   oxybutynin (DITROPAN-XL) 5 MG 24 hr tablet Take by mouth daily      pravastatin (PRAVACHOL) 40 MG tablet TAKE ONE  TABLET BY MOUTH ONCE DAILY  Qty: 90 tablet, Refills: 3    Associated Diagnoses: Hyperlipidemia with target LDL less than 100      aspirin 81 MG chewable tablet Take 1 tablet (81 mg) by mouth daily  Qty: 100 tablet, Refills: 1    Associated Diagnoses: Type 2 diabetes mellitus without complication, without long-term current use of insulin (H)      levothyroxine (SYNTHROID/LEVOTHROID) 50 MCG tablet Take 1 tablet (50 mcg) by mouth daily  Qty: 90 tablet, Refills: 2    Associated Diagnoses: Hypothyroidism, unspecified type      diltiazem 120 MG 24 hr capsule Take 1 capsule (120 mg) by mouth daily  Qty: 90 capsule, Refills: 2      amitriptyline (ELAVIL) 25 MG tablet Take 1 tablet (25 mg) by mouth At Bedtime  Qty: 90 tablet, Refills: 2    Associated Diagnoses: Insomnia, unspecified      Multiple Vitamins-Minerals (MULTIVITAL PO) Take  by mouth. daily      acetaminophen (TYLENOL) 325 MG tablet Take 2 tablets (650 mg) by mouth every 4 hours as needed for mild pain  Qty: 100 tablet      alum & mag hydroxide-simethicone (MYLANTA ES/MAALOX  ES) 400-400-40 MG/5ML SUSP suspension Take 30 mLs by mouth every 4 hours as needed for indigestion  Refills: 0      blood glucose (NO BRAND SPECIFIED) lancets standard Glucose test strips  Use to test blood sugar 1 times daily or as directed.  Qty: 1 Box, Refills: 11    Associated Diagnoses: Type 2 diabetes mellitus without complication, without long-term current use of insulin (H)      blood glucose monitoring (NO BRAND SPECIFIED) test strip Use to test blood sugars 1 times daily or as directed  Qty: 100 strip, Refills: 3    Associated Diagnoses: Type 2 diabetes mellitus without complication, without long-term current use of insulin (H)                   Consultations:   No consultations were requested during this hospital stay          Brief History of Illness:   77 year old female patient with multiple chronic medical problems and recent hospitalization for vomiting and diarrhea presented  with 1-2 day history of worsening abdominal pain, nausea, vomiting and constipation since her last hospital stay.  Because of persistent symptoms despite initial treatment in the emergency room, emergency room provider recommended hospitalization for observation. See ER note and history and physical for details.          Hospital Course:   Patient was observed in the hospital.  She was treated with IV fluids.  She had several bowel movements with loose stool.  Nausea and vomiting resolved, and she tolerated advancing diet.  Ongoing symptoms of GERD were improved with twice daily dosing of Protonix.  She did not have fever, and leukocytosis resolved.  Lactic acid level returned to normal.  As she improved, potassium level decreased, so her dosage of potassium supplementation was increased.  She had persistent harsh cough during her hospital stay attributed to GERD, and coughing exacerbated her upper abdominal pain.  After investigation, nausea, vomiting, and abdominal pain due to constipation was suspected.  She also had increasing symptoms of GERD.    I evaluated this patient on the day of discharge.  She appeared obese and was resting comfortably without signs of acute distress.  Vital signs were stable and she remained afebrile.  Abdomen was soft with mild upper abdominal tenderness but no peritoneal signs.            Discharge Instructions and Follow-Up:   Discharge diet: Moderate consistent carbohydrate (6749-2992 daisy / 4-6 CHO units per meal)   Discharge activity: Activity as tolerated   Discharge follow-up: Follow up with primary care provider in 3 days      Pending test results:   Unresulted Labs Ordered in the Past 30 Days of this Admission     Date and Time Order Name Status Description    9/10/2017 0528 Beta strep group A culture Preliminary     9/10/2017 0504 Blood culture Preliminary     9/10/2017 0504 Blood culture Preliminary                Discharge Disposition:   Discharged to home       Attestation:  I have reviewed today's vital signs, notes, medications, and test results.    Allen Ko MD

## 2017-09-11 NOTE — PROGRESS NOTES
S-(situation): Patient discharged to home via W/C with her daughter.    B-(background): Observation goals met.    A-(assessment): Goals were met for D/C.    R-(recommendations): Discharge instructions reviewed with pt and daughter. Listed belongings gathered and returned to patient.  F/U appt was made.Patient Education resolved: Yes  New medications-Pt. Has been educated about reason of use and side effects Yes  Home and hospital acquired medications returned to patient NA  Medication Bin checked and emptied on discharge Yes

## 2017-09-11 NOTE — CONSULTS
Care Transition Initial Assessment - RN    Reason For Consult: care coordination/care conference   Met with: Patient and Family.    DATA:   Principal Problem:    Nausea and vomiting in adult  Active Problems:    GERD (gastroesophageal reflux disease)    Essential hypertension with goal blood pressure less than 140/90    Hypothyroidism, unspecified type    Type 2 diabetes mellitus without complication, without long-term current use of insulin (H)    Dehydration    Elevated lactic acid level    Leucocytosis    Constipation    Acute kidney injury (H)    Cough    Sliding hiatal hernia    Calculus of gallbladder without cholecystitis       Cognitive Status: awake, alert and oriented.  [facility]  Primary Care MD Name: Chanell Nelson  Contact information and PCP information verified: Yes  Lives With: alone  Living Arrangements: apartment    Description of Support System: Supportive, Involved Who is your support system?: Children  Support Assessment: Adequate family and caregiver support   Insurance concerns: No Insurance issues identified    ASSESSMENT:  Patient currently receives the following services:  none   Identified issues/concerns regarding health management: Diabetes under good control. Was admitted partially because of non-adherence to medication plan. Insurance would not cover her PPI med.  Transportation concerns: None, she usually drives. Family will transport    PLAN:  Financial costs for the patient include: possibly meds, but states she can afford. MD helping her with prior authorization paperwork.  Patient given options and choices for discharge:  Yes, patient declined home care at this time, but writer informed her that this is something that could be added on an outpatient basis through her PCP.  Patient/family is agreeable to the plan?  Yes  Patient anticipates discharging to: her apartment.        Patient anticipates needs for home equipment: No  Discharge Planner    Discharge Plans in progress: Hand-off to Clinic Care Coordinator, MTM referral  Barriers to discharge plan: none  Plan/Disposition: Home       Care  (CTS) will continue to follow as needed.

## 2017-09-12 ENCOUNTER — TELEPHONE (OUTPATIENT)
Dept: FAMILY MEDICINE | Facility: CLINIC | Age: 77
End: 2017-09-12

## 2017-09-12 ENCOUNTER — TELEPHONE (OUTPATIENT)
Dept: PHARMACY | Facility: OTHER | Age: 77
End: 2017-09-12

## 2017-09-12 ENCOUNTER — CARE COORDINATION (OUTPATIENT)
Dept: CARE COORDINATION | Facility: CLINIC | Age: 77
End: 2017-09-12

## 2017-09-12 ENCOUNTER — TELEPHONE (OUTPATIENT)
Dept: INTERNAL MEDICINE | Facility: CLINIC | Age: 77
End: 2017-09-12

## 2017-09-12 LAB
BACTERIA SPEC CULT: NORMAL
SPECIMEN SOURCE: NORMAL

## 2017-09-12 NOTE — TELEPHONE ENCOUNTER
MTM referral from: Transitions of Care (recent hospital discharge or ED visit)    MTM referral outreach attempt #1 on September 12, 2017 at 2:00 PM      Outcome: Left Message    Hermelinda Harris, MTM Coordinator Intern

## 2017-09-12 NOTE — PROGRESS NOTES
"Clinic Care Coordination Contact  OUTREACH    Referral Information:  Referral Source: CTS  Reason for Contact: Hospital Follow up        Universal Utilization:   ED Visits in last year: 0  Hospital visits in last year: 2  Last PCP appointment: 08/10/17  Missed Appointments: 5    Clinical Concerns:  Current Medical Concerns: Called and spoke with pt, states she is doing ok since she has been home. She then asked if writer could speak to her daughter April who was there helping her.  Daughter states pt \"was having trouble with diarrhea and vomiting which made her lab work go all out of wack\"  She seems to be doing better now and has been having loose stools still but are becoming more formed.  Daughter is concerned she may become constipated again and is wondering what she should be watching for.  States this is the first time she has had trouble with constipation. Daughter is also wondering about a change in dose of pt's lisinopril.  States pt used to be taking two twice a day now she is taking one twice a day. Reviewed the notes from both hospitalization discharge summary, it appears it was decreased at first hospitalization and has not been changed since. Advised to talk with PCP regarding change.  Also discussed signs of constipation and when to intervene. daughter and pt will also discuss treatment plan if this is a new ongoing concern.  As far as her blood sugars, daughter states they have been running from 100-180.    Current Behavioral Concerns: no current concerns    Education Provided to patient: as above      Clinical Pathway: None    Medication Management:  Daughter helps with medication set up.      Functional Status: independent      Transportation: Family     Psychosocial:  Current living arrangement:: I live alone-daughter lives close by and helps out frequently  Financial/Insurance: no current concerns    Resources and Interventions:  Current Resources: none     Goals: TBD  Patient/Caregiver " understanding: Daughter and pt verbalize understand of discharge instructions and will follow up at appt.   Frequency of Care Coordination: weekly   Upcoming appointment: 09/14/17     Plan:   Pt will continue to take medications as directed  Pt will attend appt 9/14/17 with PCP  RN will outreach in 1-2 weeks to discuss goals.     Pat Carney RN, BSN  Care Coordination    [address] [address]  Office: [phone]  Fax [phone]   [email or web address]   [email or web address]     Connect with [facility] on social media.

## 2017-09-12 NOTE — TELEPHONE ENCOUNTER
MTM referral from: Transitions of Care (recent hospital discharge or ED visit)    MTM referral outreach attempt #2 on September 12, 2017 at 3:02 PM      Outcome: Patient is not interested at this time because they feel that it is not necessary at this time, will route to MTM Pharmacist/Provider as an FYI. Thank you for the referral.     Princess Otoole MTM Coordinator

## 2017-09-14 ENCOUNTER — OFFICE VISIT (OUTPATIENT)
Dept: FAMILY MEDICINE | Facility: CLINIC | Age: 77
End: 2017-09-14
Payer: COMMERCIAL

## 2017-09-14 ENCOUNTER — TELEPHONE (OUTPATIENT)
Dept: FAMILY MEDICINE | Facility: CLINIC | Age: 77
End: 2017-09-14

## 2017-09-14 VITALS
WEIGHT: 197 LBS | TEMPERATURE: 98.2 F | SYSTOLIC BLOOD PRESSURE: 136 MMHG | DIASTOLIC BLOOD PRESSURE: 80 MMHG | HEART RATE: 60 BPM | BODY MASS INDEX: 32.78 KG/M2

## 2017-09-14 DIAGNOSIS — N17.9 ACUTE KIDNEY INJURY (H): ICD-10-CM

## 2017-09-14 DIAGNOSIS — E11.9 TYPE 2 DIABETES MELLITUS WITHOUT COMPLICATION, WITHOUT LONG-TERM CURRENT USE OF INSULIN (H): ICD-10-CM

## 2017-09-14 DIAGNOSIS — E86.0 DEHYDRATION: ICD-10-CM

## 2017-09-14 DIAGNOSIS — R19.7 DIARRHEA, UNSPECIFIED TYPE: ICD-10-CM

## 2017-09-14 DIAGNOSIS — K21.9 GASTROESOPHAGEAL REFLUX DISEASE, ESOPHAGITIS PRESENCE NOT SPECIFIED: Primary | ICD-10-CM

## 2017-09-14 DIAGNOSIS — E87.6 HYPOKALEMIA: ICD-10-CM

## 2017-09-14 DIAGNOSIS — R11.2 NAUSEA AND VOMITING IN ADULT: ICD-10-CM

## 2017-09-14 DIAGNOSIS — D72.829 LEUKOCYTOSIS, UNSPECIFIED TYPE: ICD-10-CM

## 2017-09-14 DIAGNOSIS — Z23 NEED FOR PROPHYLACTIC VACCINATION AND INOCULATION AGAINST INFLUENZA: ICD-10-CM

## 2017-09-14 LAB
ANION GAP SERPL CALCULATED.3IONS-SCNC: 9 MMOL/L (ref 3–14)
BASOPHILS # BLD AUTO: 0 10E9/L (ref 0–0.2)
BASOPHILS NFR BLD AUTO: 0.5 %
BUN SERPL-MCNC: 13 MG/DL (ref 7–30)
CALCIUM SERPL-MCNC: 9.3 MG/DL (ref 8.5–10.1)
CHLORIDE SERPL-SCNC: 104 MMOL/L (ref 94–109)
CO2 SERPL-SCNC: 31 MMOL/L (ref 20–32)
CREAT SERPL-MCNC: 1.08 MG/DL (ref 0.52–1.04)
CRP SERPL-MCNC: 33.4 MG/L (ref 0–8)
DIFFERENTIAL METHOD BLD: ABNORMAL
EOSINOPHIL # BLD AUTO: 0.3 10E9/L (ref 0–0.7)
EOSINOPHIL NFR BLD AUTO: 4.1 %
ERYTHROCYTE [DISTWIDTH] IN BLOOD BY AUTOMATED COUNT: 14 % (ref 10–15)
GFR SERPL CREATININE-BSD FRML MDRD: 49 ML/MIN/1.7M2
GLUCOSE SERPL-MCNC: 102 MG/DL (ref 70–99)
HCT VFR BLD AUTO: 34.3 % (ref 35–47)
HGB BLD-MCNC: 10.5 G/DL (ref 11.7–15.7)
IMM GRANULOCYTES # BLD: 0 10E9/L (ref 0–0.4)
IMM GRANULOCYTES NFR BLD: 0.5 %
LYMPHOCYTES # BLD AUTO: 1.8 10E9/L (ref 0.8–5.3)
LYMPHOCYTES NFR BLD AUTO: 28.6 %
MCH RBC QN AUTO: 27 PG (ref 26.5–33)
MCHC RBC AUTO-ENTMCNC: 30.6 G/DL (ref 31.5–36.5)
MCV RBC AUTO: 88 FL (ref 78–100)
MONOCYTES # BLD AUTO: 0.7 10E9/L (ref 0–1.3)
MONOCYTES NFR BLD AUTO: 11.2 %
NEUTROPHILS # BLD AUTO: 3.4 10E9/L (ref 1.6–8.3)
NEUTROPHILS NFR BLD AUTO: 55.1 %
PLATELET # BLD AUTO: 263 10E9/L (ref 150–450)
POTASSIUM SERPL-SCNC: 3.5 MMOL/L (ref 3.4–5.3)
RBC # BLD AUTO: 3.89 10E12/L (ref 3.8–5.2)
SODIUM SERPL-SCNC: 144 MMOL/L (ref 133–144)
WBC # BLD AUTO: 6.2 10E9/L (ref 4–11)

## 2017-09-14 PROCEDURE — 99496 TRANSJ CARE MGMT HIGH F2F 7D: CPT | Mod: 25 | Performed by: NURSE PRACTITIONER

## 2017-09-14 PROCEDURE — 85025 COMPLETE CBC W/AUTO DIFF WBC: CPT | Performed by: NURSE PRACTITIONER

## 2017-09-14 PROCEDURE — 90471 IMMUNIZATION ADMIN: CPT | Performed by: NURSE PRACTITIONER

## 2017-09-14 PROCEDURE — 86140 C-REACTIVE PROTEIN: CPT | Performed by: NURSE PRACTITIONER

## 2017-09-14 PROCEDURE — 90662 IIV NO PRSV INCREASED AG IM: CPT | Performed by: NURSE PRACTITIONER

## 2017-09-14 PROCEDURE — 36415 COLL VENOUS BLD VENIPUNCTURE: CPT | Performed by: NURSE PRACTITIONER

## 2017-09-14 PROCEDURE — 80048 BASIC METABOLIC PNL TOTAL CA: CPT | Performed by: NURSE PRACTITIONER

## 2017-09-14 RX ORDER — PANTOPRAZOLE SODIUM 40 MG/1
40 TABLET, DELAYED RELEASE ORAL
Qty: 180 TABLET | Refills: 1 | Status: SHIPPED | OUTPATIENT
Start: 2017-09-14 | End: 2018-01-30

## 2017-09-14 NOTE — TELEPHONE ENCOUNTER
PA needed on:Protonix - BID dose  Insurance:[employer]  Ins. Phone:[phone]  Patient ID:[id number]  PCN:[id number]  BIN:[id number]    Please let us know when PA is granted/denied. Thank You      [facility], [place]

## 2017-09-14 NOTE — PROGRESS NOTES
SUBJECTIVE:   Mya Hui is a 77 year old female who presents to clinic today for the following health issues:          Hospital Follow-up Visit:    Hospital/Nursing Home/IP Rehab Facility: [facility]  Date of Admission: 9/3-9/7/17, 9/9/17-9/11/17  Date of Discharge:   Reason(s) for Admission: N&V, hiatal hernia, reflux            Problems taking medications regularly:  None       Medication changes since discharge: None       Problems adhering to non-medication therapy:  None    Summary of hospitalization:  [facility] discharge summary reviewed. The patient has had 3 recent visits to the emergency room, the last 2 of which resulted in overnight hospitalization. Her first ER visit was about 2 weeks ago, to the [facility] ER with sudden onset of abdominal pain, nausea and vomiting. She was rehydrated, discharged with diagnosis of gastritis. Her symptoms did not improve, and she presents to our ED 9/3 with abdominal pain, nausea and vomiting, was found to be dehydrated and hypokalemic. She was admitted to the hospital. She improved with rehydration, potassium  Normalized, medications were adjusted, and she was discharged home in stable condition. Her symptoms fail to completely resolve, and once again is exacerbated. She returned to the ED 9/9 with the same symptoms. She was admitted overnight. She was thought to have dehydration and hypokalemia secondary to persistent nausea and vomiting secondary to constipation. She had several large bowel movements, appeared improved, and was discharged home. She has had a normal chest x-ray, normal CT of the abdomen and pelvis with exception of a sliding hiatal hernia. The following is a note from her discharge summary:       Brief History of Illness:   77 year old female patient with multiple chronic medical problems and recent hospitalization for vomiting and diarrhea presented with 1-2 day history of worsening abdominal pain, nausea, vomiting  and constipation since her last hospital stay.  Because of persistent symptoms despite initial treatment in the emergency room, emergency room provider recommended hospitalization for observation. See ER note and history and physical for details.           Hospital Course:   Patient was observed in the hospital.  She was treated with IV fluids.  She had several bowel movements with loose stool.  Nausea and vomiting resolved, and she tolerated advancing diet.  Ongoing symptoms of GERD were improved with twice daily dosing of Protonix.  She did not have fever, and leukocytosis resolved.  Lactic acid level returned to normal.  As she improved, potassium level decreased, so her dosage of potassium supplementation was increased.  She had persistent harsh cough during her hospital stay attributed to GERD, and coughing exacerbated her upper abdominal pain.  After investigation, nausea, vomiting, and abdominal pain due to constipation was suspected.  She also had increasing symptoms of GERD.     I evaluated this patient on the day of discharge.  She appeared obese and was resting comfortably without signs of acute distress.  Vital signs were stable and she remained afebrile.  Abdomen was soft with mild upper abdominal tenderness but no peritoneal signs.       Diagnostic Tests/Treatments reviewed.  Follow up needed: Recheck lab  Other Healthcare Providers Involved in Patient s Care:         None  Update since discharge: stable. She is accompanied today by her daughter. She remains fatigued and weakened. States her abdominal pain has improved. She has persistent liquid stools, denies noting blood in the stools. She has not run a fever; reports nausea, she has minimal appetite, very dry mucous membranes. She was advised to discontinue oxybutynin, but has such a problem with urinary stress incontinence, she does not want to discontinue taking this. She continues to have a persistent cough, which apparently has been a treated to  her gastroesophageal reflux    Post Discharge Medication Reconciliation: discharge medications reconciled, continue medications without change.  Plan of care communicated with patient and family     Coding guidelines for this visit:  Type of Medical   Decision Making Face-to-Face Visit       within 7 Days of discharge Face-to-Face Visit        within 14 days of discharge   Moderate Complexity [id number]   High Complexity [id number]              Problem list and histories reviewed & adjusted, as indicated.  Additional history: as documented    Current Outpatient Prescriptions   Medication Sig Dispense Refill     pantoprazole (PROTONIX) 40 MG EC tablet Take 1 tablet (40 mg) by mouth 2 times daily Take 30-60 minutes before a meal. 180 tablet 1     metFORMIN (GLUCOPHAGE-XR) 500 MG 24 hr tablet ONE TABLET BY MOUTH EVERY MORNING AND TAKE TWO TABLETS BY MOUTH EVERY EVENING WITH MEALS 270 tablet 2     lisinopril-hydrochlorothiazide (PRINZIDE/ZESTORETIC) 20-12.5 MG per tablet Take 1 tablet by mouth 2 times daily Do not restart until 9/12/17 60 tablet 0     omeprazole (PRILOSEC) 20 MG CR capsule Take 1 capsule (20 mg) by mouth every evening 30 capsule 1     potassium chloride (K-TAB,KLOR-CON) 10 MEQ tablet Take 1 tablet (10 mEq) by mouth 3 times daily 90 tablet 0     oxybutynin (DITROPAN-XL) 5 MG 24 hr tablet Take by mouth daily       acetaminophen (TYLENOL) 325 MG tablet Take 2 tablets (650 mg) by mouth every 4 hours as needed for mild pain 100 tablet      pravastatin (PRAVACHOL) 40 MG tablet TAKE ONE TABLET BY MOUTH ONCE DAILY 90 tablet 3     levothyroxine (SYNTHROID/LEVOTHROID) 50 MCG tablet Take 1 tablet (50 mcg) by mouth daily 90 tablet 2     diltiazem 120 MG 24 hr capsule Take 1 capsule (120 mg) by mouth daily 90 capsule 2     blood glucose (NO BRAND SPECIFIED) lancets standard Glucose test strips  Use to test blood sugar 1 times daily or as directed. 1 Box 11     blood glucose monitoring (NO BRAND SPECIFIED) test  strip Use to test blood sugars 1 times daily or as directed 100 strip 3     amitriptyline (ELAVIL) 25 MG tablet Take 1 tablet (25 mg) by mouth At Bedtime 90 tablet 2     Multiple Vitamins-Minerals (MULTIVITAL PO) Take  by mouth. daily       BP Readings from Last 3 Encounters:   09/14/17 136/80   09/11/17 133/71   09/05/17 153/83    Wt Readings from Last 3 Encounters:   09/14/17 197 lb (89.4 kg)   09/10/17 199 lb 8.3 oz (90.5 kg)   09/04/17 203 lb 14.8 oz (92.5 kg)                      Reviewed and updated as needed this visit by clinical staffTobacco  Allergies  Meds  Med Hx  Surg Hx  Fam Hx  Soc Hx      Reviewed and updated as needed this visit by Provider         ROS:  Constitutional, HEENT, cardiovascular, pulmonary, GI, , musculoskeletal, neuro, skin, endocrine and psych systems are negative, except as otherwise noted.      OBJECTIVE:   /80  Pulse 60  Temp 98.2  F (36.8  C) (Tympanic)  Wt 197 lb (89.4 kg)  BMI 32.78 kg/m2  Body mass index is 32.78 kg/(m^2).   GENERAL: Nutrition and hydration status appeared normal. Patient appears fatigued, pale  EYES: Eyes grossly normal to inspection, PERRL and conjunctivae and sclerae normal  HENT: Ear canals are clear, TMs pearly gray. Mucous membranes and lips dry. Oropharynx otherwise normal  NECK: no adenopathy, no asymmetry, masses, or scars and thyroid normal to palpation  RESP: lungs clear to auscultation - no rales, rhonchi or wheezes  CV: regular rate and rhythm, normal S1 S2, no S3 or S4, no murmur, click or rub, no peripheral edema and peripheral pulses strong  ABDOMEN: Soft, rotund. Active bowel sounds in all quadrants. Tender in the epigastrium to palpation, no guarding or rebound tenderness. No masses, no organomegaly palpated  MS: no gross musculoskeletal defects noted, no edema  SKIN: no suspicious lesions or rashes  NEURO: Normal strength and tone, mentation intact and speech normal  PSYCH: mentation appears normal, affect  normal    Diagnostic Test Results:  Results for orders placed or performed in visit on 09/14/17   CBC with platelets and differential   Result Value Ref Range    WBC 6.2 4.0 - 11.0 10e9/L    RBC Count 3.89 3.8 - 5.2 10e12/L    Hemoglobin 10.5 (L) 11.7 - 15.7 g/dL    Hematocrit 34.3 (L) 35.0 - 47.0 %    MCV 88 78 - 100 fl    MCH 27.0 26.5 - 33.0 pg    MCHC 30.6 (L) 31.5 - 36.5 g/dL    RDW 14.0 10.0 - 15.0 %    Platelet Count 263 150 - 450 10e9/L    Diff Method Automated Method     % Neutrophils 55.1 %    % Lymphocytes 28.6 %    % Monocytes 11.2 %    % Eosinophils 4.1 %    % Basophils 0.5 %    % Immature Granulocytes 0.5 %    Absolute Neutrophil 3.4 1.6 - 8.3 10e9/L    Absolute Lymphocytes 1.8 0.8 - 5.3 10e9/L    Absolute Monocytes 0.7 0.0 - 1.3 10e9/L    Absolute Eosinophils 0.3 0.0 - 0.7 10e9/L    Absolute Basophils 0.0 0.0 - 0.2 10e9/L    Abs Immature Granulocytes 0.0 0 - 0.4 10e9/L   Basic metabolic panel  (Ca, Cl, CO2, Creat, Gluc, K, Na, BUN)   Result Value Ref Range    Sodium 144 133 - 144 mmol/L    Potassium 3.5 3.4 - 5.3 mmol/L    Chloride 104 94 - 109 mmol/L    Carbon Dioxide 31 20 - 32 mmol/L    Anion Gap 9 3 - 14 mmol/L    Glucose 102 (H) 70 - 99 mg/dL    Urea Nitrogen 13 7 - 30 mg/dL    Creatinine 1.08 (H) 0.52 - 1.04 mg/dL    GFR Estimate 49 (L) >60 mL/min/1.7m2    GFR Estimate If Black 60 (L) >60 mL/min/1.7m2    Calcium 9.3 8.5 - 10.1 mg/dL   CRP, inflammation   Result Value Ref Range    CRP Inflammation 33.4 (H) 0.0 - 8.0 mg/L       ASSESSMENT/PLAN:     Problem List Items Addressed This Visit        Medium    Type 2 diabetes mellitus without complication, without long-term current use of insulin (H)    Nausea and vomiting in adult    Leucocytosis    Relevant Orders    CBC with platelets and differential (Completed)    CRP, inflammation (Completed)    Hypokalemia    Relevant Orders    Basic metabolic panel  (Ca, Cl, CO2, Creat, Gluc, K, Na, BUN) (Completed)    Gastroesophageal reflux disease,  esophagitis presence not specified - Primary    Relevant Medications    pantoprazole (PROTONIX) 40 MG EC tablet    Dehydration    Relevant Orders    CBC with platelets and differential (Completed)    Basic metabolic panel  (Ca, Cl, CO2, Creat, Gluc, K, Na, BUN) (Completed)    Acute kidney injury (H)      Other Visit Diagnoses     Diarrhea, unspecified type        Relevant Orders    Clostridium difficile Toxin B PCR    Need for prophylactic vaccination and inoculation against influenza        Relevant Orders    FLU VACCINE, INCREASED ANTIGEN, PRESV FREE, AGE 65+ [96516] (Completed)    Vaccine Administration, Initial [91823] (Completed)         Hematocrit is normal, does not appear to be dehydrated in spite of dry mucous membranes, most likely related to oxybutynin  Hemoglobin remains low at 10.5  Creatinine is elevated, CRP has also increased since hospital discharge    Because of her chronic use of PPIs, she is at increased risk for C. difficile. She is having assistant liquid stools, will collect sample and rule this out  She has been awaiting prior authorization for Protonix twice daily. Until that has been approved. She is taking Protonix in the morning, and omeprazole at bedtime  Since symptoms are persisting, and CRP and creatinine have actually increased since discharge, I'm not certain we can attribute her symptoms to complications of constipation. I would like her to have consultation with internal medicine and recheck of her labs. Appointment has been scheduled next week.    MAYI Anthony [facility]  Injectable Influenza Immunization Documentation    1.  Are you sick today? (Fever of 100.5 or higher on the day of the clinic)   No    2.  Have you ever had Guillain-[place] Syndrome within 6 weeks of an influenza vaccionation?  No    3. Do you have a life-threatening allergy to eggs?  No    4. Do you have a life-threatening allergy to a component of the vaccine? May include  antibiotics, gelatin or latex.  No     5. Have you ever had a reaction to a dose of flu vaccine that needed immediate medical attention?  No     Form completed by ACa/MA    Verified patient's name and date of birth to confirm prior to injection. Instructed patient to remain in clinic 20 minutes following injection, in case allergic reaction occurs seek medical staff. A Case MA

## 2017-09-15 LAB
BACTERIA SPEC CULT: NORMAL
BACTERIA SPEC CULT: NORMAL
SPECIMEN SOURCE: NORMAL
SPECIMEN SOURCE: NORMAL

## 2017-09-18 ENCOUNTER — TELEPHONE (OUTPATIENT)
Dept: INTERNAL MEDICINE | Facility: CLINIC | Age: 77
End: 2017-09-18

## 2017-09-18 ENCOUNTER — OFFICE VISIT (OUTPATIENT)
Dept: INTERNAL MEDICINE | Facility: CLINIC | Age: 77
End: 2017-09-18
Payer: COMMERCIAL

## 2017-09-18 VITALS
HEART RATE: 88 BPM | OXYGEN SATURATION: 97 % | SYSTOLIC BLOOD PRESSURE: 126 MMHG | TEMPERATURE: 96.7 F | RESPIRATION RATE: 16 BRPM | DIASTOLIC BLOOD PRESSURE: 66 MMHG | BODY MASS INDEX: 32.45 KG/M2 | WEIGHT: 195 LBS

## 2017-09-18 DIAGNOSIS — R79.82 ELEVATED C-REACTIVE PROTEIN: ICD-10-CM

## 2017-09-18 DIAGNOSIS — K21.9 GASTROESOPHAGEAL REFLUX DISEASE WITHOUT ESOPHAGITIS: ICD-10-CM

## 2017-09-18 DIAGNOSIS — D64.9 ANEMIA, UNSPECIFIED TYPE: Primary | ICD-10-CM

## 2017-09-18 PROBLEM — D72.829 LEUCOCYTOSIS: Status: RESOLVED | Noted: 2017-09-10 | Resolved: 2017-09-18

## 2017-09-18 LAB
CREAT SERPL-MCNC: 1.16 MG/DL (ref 0.52–1.04)
CRP SERPL-MCNC: 6 MG/L (ref 0–8)
ERYTHROCYTE [SEDIMENTATION RATE] IN BLOOD BY WESTERGREN METHOD: 18 MM/H (ref 0–30)
GFR SERPL CREATININE-BSD FRML MDRD: 45 ML/MIN/1.7M2
HGB BLD-MCNC: 11.6 G/DL (ref 11.7–15.7)
IRON SATN MFR SERPL: 12 % (ref 15–46)
IRON SERPL-MCNC: 46 UG/DL (ref 35–180)
RETICS # AUTO: 75.1 10E9/L (ref 25–95)
RETICS/RBC NFR AUTO: 1.7 % (ref 0.5–2)
TIBC SERPL-MCNC: 381 UG/DL (ref 240–430)

## 2017-09-18 PROCEDURE — 82565 ASSAY OF CREATININE: CPT | Performed by: INTERNAL MEDICINE

## 2017-09-18 PROCEDURE — 86140 C-REACTIVE PROTEIN: CPT | Performed by: INTERNAL MEDICINE

## 2017-09-18 PROCEDURE — 85018 HEMOGLOBIN: CPT | Performed by: INTERNAL MEDICINE

## 2017-09-18 PROCEDURE — 36415 COLL VENOUS BLD VENIPUNCTURE: CPT | Performed by: INTERNAL MEDICINE

## 2017-09-18 PROCEDURE — 85652 RBC SED RATE AUTOMATED: CPT | Performed by: INTERNAL MEDICINE

## 2017-09-18 PROCEDURE — 83540 ASSAY OF IRON: CPT | Performed by: INTERNAL MEDICINE

## 2017-09-18 PROCEDURE — 99214 OFFICE O/P EST MOD 30 MIN: CPT | Performed by: INTERNAL MEDICINE

## 2017-09-18 PROCEDURE — 83550 IRON BINDING TEST: CPT | Performed by: INTERNAL MEDICINE

## 2017-09-18 PROCEDURE — 85045 AUTOMATED RETICULOCYTE COUNT: CPT | Performed by: INTERNAL MEDICINE

## 2017-09-18 ASSESSMENT — PAIN SCALES - GENERAL: PAINLEVEL: NO PAIN (0)

## 2017-09-18 NOTE — MR AVS SNAPSHOT
After Visit Summary   9/18/2017    Mya Hui    MRN: [id number]           Patient Information     Date Of Birth          1940        Visit Information        Provider Department      9/18/2017 2:15 PM Jose Browning MD [facility]         Follow-ups after your visit        Your next 10 appointments already scheduled     Sep 18, 2017  2:15 PM CDT   Office Visit with Jose Browning MD   [facility] ([address] [address] [phone]           Bring a current list of meds and any records pertaining to this visit. For Physicals, please bring immunization records and any forms needing to be filled out. Please arrive 10 minutes early to complete paperwork.            Oct 09, 2017  1:00 PM CDT   Return Visit with Annamaria Carpio MD   [facility] ([address] [id number] [phone]              Who to contact     If you have questions or need follow up information about today's clinic visit or your schedule please contact [facility] directly at [phone].  Normal or non-critical lab and imaging results will be communicated to you by MyChart, letter or phone within 4 business days after the clinic has received the results. If you do not hear from us within 7 days, please contact the clinic through [employer] or phone. If you have a critical or abnormal lab result, we will notify you by phone as soon as possible.  Submit refill requests through [employer] or call your pharmacy and they will forward the refill request to us. Please allow 3 business days for your refill to be completed.          Additional Information About Your Visit        MyChart Information     [employer] lets you send messages to your doctor, view your test results, renew your prescriptions, schedule appointments and more. To sign up, go to  [email or web address] . Click on \"Log in\" on the left side of the screen, which will take you to the Welcome page. Then click on \"Sign up Now\" on the right side of the page.     You will be asked to enter the access code listed below, as well as some personal information. Please follow the directions to create your username and password.     Your access code is: [email or web address]  Expires: 2017 11:01 AM     Your access code will  in 90 days. If you need help or a new code, please call your [place] clinic or [phone].        Care EveryWhere ID     This is your Care EveryWhere ID. This could be used by other organizations to access your [place] medical records  [id number]        Your Vitals Were     Pulse Temperature Respirations Pulse Oximetry BMI (Body Mass Index)       88 96.7  F (35.9  C) (Temporal) 16 97% 32.45 kg/m2        Blood Pressure from Last 3 Encounters:   17 126/66   17 136/80   17 133/71    Weight from Last 3 Encounters:   17 195 lb (88.5 kg)   17 197 lb (89.4 kg)   09/10/17 199 lb 8.3 oz (90.5 kg)              Today, you had the following     No orders found for display       Primary Care Provider Office Phone # Fax #    Chanell Radha Nelson, MAYI [facility] [phone]       [address] [place] [address]        Equal Access to Services     [facility] AH: Hadii aad ku hadasho Soomaali, waaxda luqadaha, qaybta kaalmada adeegyada, waxay carla johnson . So [facility] [phone].    ATENCIÓN: Si habla español, tiene a pedro disposición servicios gratuitos de asistencia lingüística. Llame al [phone].    We comply with applicable federal civil rights laws and [place] laws. We do not discriminate on the basis of race, color, national origin, age, disability sex, sexual orientation or gender identity.            Thank you!     Thank you for choosing [facility]  for your care. Our goal is always to provide you with " excellent care. Hearing back from our patients is one way we can continue to improve our services. Please take a few minutes to complete the written survey that you may receive in the mail after your visit with us. Thank you!             Your Updated Medication List - Protect others around you: Learn how to safely use, store and throw away your medicines at www.disposemymeds.org.          This list is accurate as of: 9/18/17  2:12 PM.  Always use your most recent med list.                   Brand Name Dispense Instructions for use Diagnosis    acetaminophen 325 MG tablet    TYLENOL    100 tablet    Take 2 tablets (650 mg) by mouth every 4 hours as needed for mild pain        amitriptyline 25 MG tablet    ELAVIL    90 tablet    Take 1 tablet (25 mg) by mouth At Bedtime    Insomnia, unspecified       blood glucose lancets standard    no brand specified    1 Box    Glucose test strips Use to test blood sugar 1 times daily or as directed.    Type 2 diabetes mellitus without complication, without long-term current use of insulin (H)       blood glucose monitoring test strip    no brand specified    100 strip    Use to test blood sugars 1 times daily or as directed    Type 2 diabetes mellitus without complication, without long-term current use of insulin (H)       diltiazem 120 MG 24 hr capsule    CARDIZEM CD/CARTIA XT    90 capsule    Take 1 capsule (120 mg) by mouth daily        levothyroxine 50 MCG tablet    SYNTHROID/LEVOTHROID    90 tablet    Take 1 tablet (50 mcg) by mouth daily    Hypothyroidism, unspecified type       lisinopril-hydrochlorothiazide 20-12.5 MG per tablet    PRINZIDE/ZESTORETIC    60 tablet    Take 1 tablet by mouth 2 times daily Do not restart until 9/12/17    Type 2 diabetes mellitus without complication, without long-term current use of insulin (H)       metFORMIN 500 MG 24 hr tablet    GLUCOPHAGE-XR    270 tablet    ONE TABLET BY MOUTH EVERY MORNING AND TAKE TWO TABLETS BY MOUTH EVERY EVENING  WITH MEALS    Type 2 diabetes mellitus without complication, without long-term current use of insulin (H)       MULTIVITAL PO      Take  by mouth. daily        omeprazole 20 MG CR capsule    priLOSEC    30 capsule    Take 1 capsule (20 mg) by mouth every evening    Gastroesophageal reflux disease, esophagitis presence not specified       oxybutynin 5 MG 24 hr tablet    DITROPAN-XL     Take by mouth daily        pantoprazole 40 MG EC tablet    PROTONIX    180 tablet    Take 1 tablet (40 mg) by mouth 2 times daily Take 30-60 minutes before a meal.    Gastroesophageal reflux disease, esophagitis presence not specified       potassium chloride 10 MEQ tablet    K-TAB,KLOR-CON    90 tablet    Take 1 tablet (10 mEq) by mouth 3 times daily    Hypokalemia       pravastatin 40 MG tablet    PRAVACHOL    90 tablet    TAKE ONE TABLET BY MOUTH ONCE DAILY    Hyperlipidemia with target LDL less than 100

## 2017-09-18 NOTE — LETTER
September 19, 2017      Mya Hui  [address]   [place] [address]        Dear ,    We are writing to inform you of your test results.    Your labs are better with less inflammation, crp is now normal.  Kidneys are still dehydrated so you need to drink more fluids.      Iron is a little low but hemoglobin is not too bad, can add on ferrous sulfate 325 mg daily.    Resulted Orders   Hemoglobin   Result Value Ref Range    Hemoglobin 11.6 (L) 11.7 - 15.7 g/dL   Iron and iron binding capacity   Result Value Ref Range    Iron 46 35 - 180 ug/dL    Iron Binding Cap 381 240 - 430 ug/dL    Iron Saturation Index 12 (L) 15 - 46 %   Reticulocyte count   Result Value Ref Range    % Retic 1.7 0.5 - 2.0 %    Absolute Retic 75.1 25 - 95 10e9/L   Creatinine   Result Value Ref Range    Creatinine 1.16 (H) 0.52 - 1.04 mg/dL    GFR Estimate 45 (L) >60 mL/min/1.7m2      Comment:      Non  GFR Calc    GFR Estimate If Black 55 (L) >60 mL/min/1.7m2      Comment:       GFR Calc   CRP, inflammation   Result Value Ref Range    CRP Inflammation 6.0 0.0 - 8.0 mg/L   ESR: Erythrocyte sedimentation rate   Result Value Ref Range    Sed Rate 18 0 - 30 mm/h       If you have any questions or concerns, please call the clinic at the number listed above.       Sincerely,    Jose Browning MD

## 2017-09-18 NOTE — TELEPHONE ENCOUNTER
** Patient Call Attempt With Normal Lab or Test Results**    I have attempted to contact this patient by phone with the following results: left message to return my call on answering machine.    When patient returns call, please advise of the following result.  If patient has further questions or concerns route call back to team, thank you.    Her labs are better with less inflammation, crp is now normal.  Kidneys are still dehydrated so she needs to drink more fluids.      Iron is a little low but hgb is not too bad, can add on ferrous sulfate 325 mg daily.    Sejal Knutson CMA ([facility])

## 2017-09-18 NOTE — PROGRESS NOTES
SUBJECTIVE:   Mya Hui is a 77 year old female who presents to clinic today for the following health issues:    Tired and not eating well, no appetite.  Taste is off as well.      Heartburn is better with protonix twice a day.      Bowels were constipated then loose.      Past Medical History:   Diagnosis Date     Arthritis      Diabetes type 2, controlled (H)      GERD (gastroesophageal reflux disease)      History of renal calculi      HTN, goal below 140/90      Hyperlipidaemia LDL goal < 100      Hypothyroid      Insomnia      Left carotid stenosis      Migraine      Sciatica of right side 6/28/2013     Current Outpatient Prescriptions   Medication     pantoprazole (PROTONIX) 40 MG EC tablet     metFORMIN (GLUCOPHAGE-XR) 500 MG 24 hr tablet     lisinopril-hydrochlorothiazide (PRINZIDE/ZESTORETIC) 20-12.5 MG per tablet     omeprazole (PRILOSEC) 20 MG CR capsule     potassium chloride (K-TAB,KLOR-CON) 10 MEQ tablet     oxybutynin (DITROPAN-XL) 5 MG 24 hr tablet     acetaminophen (TYLENOL) 325 MG tablet     pravastatin (PRAVACHOL) 40 MG tablet     levothyroxine (SYNTHROID/LEVOTHROID) 50 MCG tablet     diltiazem 120 MG 24 hr capsule     amitriptyline (ELAVIL) 25 MG tablet     Multiple Vitamins-Minerals (MULTIVITAL PO)     blood glucose (NO BRAND SPECIFIED) lancets standard     blood glucose monitoring (NO BRAND SPECIFIED) test strip     No current facility-administered medications for this visit.      Social History   Substance Use Topics     Smoking status: Never Smoker     Smokeless tobacco: Never Used     Alcohol use Yes     Review of Systems  Constitutional-Weight loss.  ENT-No earpain, sore throat, voice changes or rhinitis.  Cardiac-No chest pain or palpitations.  Respiratory-No cough, sob, or hemoptysis.  GI-Nausea, Vomitting and Constipation.  Musculoskeletal-+joint pains.      Physical Exam  /66  Pulse 88  Temp 96.7  F (35.9  C) (Temporal)  Resp 16  Wt 195 lb (88.5 kg)  SpO2 97%  BMI  32.45 kg/m2  General Appearance-healthy, alert, no distress  Cardiac-regular rate and rhythm  with normal S1, S2 ; no murmur, rub or gallops  Lungs-clear to auscultation  GI-Soft, nontender.  Normal bowel sounds.  No hepatosplenomegaly or abnormal masses  Extremities-no peripheral edema, peripheral pulses normal          ASSESSMENT:  77-year-old woman who is normally quite healthy. She does have some diabetes. She was hospitalized with GI upset, dehydration. Question of possible gastroenteritis or food poisoning. She then became dehydrated. She has better and is taking a little bit but is still fatigued, low appetite. She did have a CT scan showed a hiatal hernia. Her last labs showed a creatinine is up slightly at 1.08 basically normal. Her CRP was elevated from 24-33.    I think overall she is doing okay. We will recheck an workup her anemia. She had a hemoglobin down to 10.4. Check iron studies and reticulocyte count. I did recommend an upper endoscopy and colonoscopy but the patient would like to wait and do the lab workup first.    Electronically signed by Jose Browning MD

## 2017-09-19 NOTE — TELEPHONE ENCOUNTER
Mya is calling back and would like her daughter notified of the results. Would someone be able to call April at [phone] (cell) and it is ok to leave a message.     Thank you. Radha Asher, Patient Representative

## 2017-09-19 NOTE — TELEPHONE ENCOUNTER
I have contacted April with results. Sejal Knutson CMA ([facility])

## 2017-09-19 NOTE — TELEPHONE ENCOUNTER
Patient returned call. I relayed message below and she is wondering if a nurse can call her daughter April to relay the results message to her because April will understand it better. The phone disconnected when she was trying to give me April's number. Please advise.     Thank you  Dylan Travis  Patient Representative

## 2017-09-19 NOTE — TELEPHONE ENCOUNTER
I have attempted to call the pt with the following results. I left message for pt to call back. I have mailed a letter to pt with results. Sejal Knutson CMA ([facility])

## 2017-09-22 ENCOUNTER — OFFICE VISIT (OUTPATIENT)
Dept: URGENT CARE | Facility: RETAIL CLINIC | Age: 77
End: 2017-09-22
Payer: COMMERCIAL

## 2017-09-22 ENCOUNTER — TELEPHONE (OUTPATIENT)
Dept: FAMILY MEDICINE | Facility: CLINIC | Age: 77
End: 2017-09-22

## 2017-09-22 VITALS
DIASTOLIC BLOOD PRESSURE: 65 MMHG | TEMPERATURE: 97.7 F | OXYGEN SATURATION: 96 % | SYSTOLIC BLOOD PRESSURE: 109 MMHG | HEART RATE: 70 BPM

## 2017-09-22 DIAGNOSIS — S40.862A INSECT BITE OF ARM, LEFT, INITIAL ENCOUNTER: Primary | ICD-10-CM

## 2017-09-22 DIAGNOSIS — L03.114 CELLULITIS OF LEFT UPPER EXTREMITY: ICD-10-CM
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
## 2017-09-22 PROCEDURE — 99203 OFFICE O/P NEW LOW 30 MIN: CPT | Performed by: PHYSICIAN ASSISTANT

## 2017-09-22 RX ORDER — CEPHALEXIN 500 MG/1
500 CAPSULE ORAL 2 TIMES DAILY
Qty: 20 CAPSULE | Refills: 0 | Status: SHIPPED | OUTPATIENT
Start: 2017-09-22 | End: 2018-01-04

## 2017-09-22 NOTE — PATIENT INSTRUCTIONS
Elevate  Cool packs  Leave open to the air as much as possible.  < use  If > redness beyond skin marker start antibiotic.  Eat yogurt daily while on antibiotic or take a probiotic.  If increase in redness, pain, discharge/drainage or any red streaks to seek prompt medical attention (clinic, urgent care or emergency department ).  [facility]  [phone]    Take an over the counter antihistamine like claritin, allegra or zyrtec.

## 2017-09-22 NOTE — PROGRESS NOTES
S:  [facility].  Pt presents with bee sting left forearm 2 days ago.  Itching.  Site is getting tichier -does not think it is much bigger than yest  Pt has tried vatious OTC creams and this is not helping.  Pt has no hx of skin sensitivities.  Pt has not had any breathing or swallowing difficulty.      ROS:  ENT - denies ear pain, throat pain. no nasal congestion.  CP - no cough,SOB or chest pain.   GI/ - Appetite - normal. No nausea, vomiting or diarrhea.   No bowel or bladder changes   MSK - no joint pain or swelling.   Skin-  See above  Past Medical History:   Diagnosis Date     Arthritis      Diabetes type 2, controlled (H)      GERD (gastroesophageal reflux disease)      History of renal calculi      HTN, goal below 140/90      Hyperlipidaemia LDL goal < 100      Hypothyroid      Insomnia      Left carotid stenosis      Migraine      Sciatica of right side 6/28/2013     Past Surgical History:   Procedure Laterality Date     BUNIONECTOMY      Right foot     CATARACT IOL, RT/LT Bilateral 2017     COLONOSCOPY  7/12/2013    Procedure: COLONOSCOPY;  Colonoscopy;  Surgeon: Giovany Espinoza MD;  Location: [facility]     FRACTURE TX, ANKLE RT/LT      Left ankle     HC CYSTOURETHROSCOPY W/ URETEROSCOPY &/OR PYELOSCOPY; W/ LITHOTRIPSY       HC REVISE MEDIAN N/CARPAL TUNNEL SURG      Right wrist     INJECT JOINT SACROILIAC  4/10/2014    Procedure: INJECT JOINT SACROILIAC;  Sacroiliac Joint Injection;  Surgeon: Gilbert Mcclure MD;  Location: [facility] OR     Patient Active Problem List   Diagnosis     Advance Care Planning     Arthritis     Nevus     Asymptomatic carotid artery stenosis     Hyperlipidemia with target LDL less than 100     GERD (gastroesophageal reflux disease)     History of renal calculi     Sciatica of right side     Hip pain     Hearing aid worn     Osteoarthritis of hip     DDD (degenerative disc disease), lumbar     OAB (overactive bladder)     Essential hypertension with goal blood pressure  less than 140/90     Hypothyroidism, unspecified type     Insomnia, unspecified     Type 2 diabetes mellitus without complication, without long-term current use of insulin (H)     Cataract, bilateral     Primary osteoarthritis involving multiple joints     Chronic right shoulder pain     Right shoulder pain, unspecified chronicity     Dry mouth     Rotator cuff arthropathy, right     Nausea and vomiting in adult     Hypokalemia     Dehydration     Elevated lactic acid level     Constipation     Acute kidney injury (H)     Cough     Sliding hiatal hernia     Calculus of gallbladder without cholecystitis     Gastroesophageal reflux disease, esophagitis presence not specified     Current Outpatient Prescriptions   Medication     cephALEXin (KEFLEX) 500 MG capsule     pantoprazole (PROTONIX) 40 MG EC tablet     metFORMIN (GLUCOPHAGE-XR) 500 MG 24 hr tablet     lisinopril-hydrochlorothiazide (PRINZIDE/ZESTORETIC) 20-12.5 MG per tablet     omeprazole (PRILOSEC) 20 MG CR capsule     potassium chloride (K-TAB,KLOR-CON) 10 MEQ tablet     oxybutynin (DITROPAN-XL) 5 MG 24 hr tablet     acetaminophen (TYLENOL) 325 MG tablet     pravastatin (PRAVACHOL) 40 MG tablet     levothyroxine (SYNTHROID/LEVOTHROID) 50 MCG tablet     diltiazem 120 MG 24 hr capsule     blood glucose (NO BRAND SPECIFIED) lancets standard     blood glucose monitoring (NO BRAND SPECIFIED) test strip     amitriptyline (ELAVIL) 25 MG tablet     Multiple Vitamins-Minerals (MULTIVITAL PO)     No current facility-administered medications for this visit.          O: /65  Pulse 70  Temp 97.7  F (36.5  C) (Oral)  SpO2 96%    Lungs -clear  Oropharynx normal - no edema.  Bee sting site left forearm - no FB at sting site. Area is pink and warm. Edges outlined in skin marker -9 x 11 cm     A:    Insect bite of arm, left, initial encounter  Cellulitis of left upper extremity - possible    P:  Try not to itch and avoid reexposure.  At this point I think this is  still local reaction. Fill rx for antibiotic -ceph - Benefits/risks/ side effects of meds discussed - if s/s secondary infection. If starts antibiotic  should be better 48 hours - if not need FOLLOW UP.  Take an over the counter antihistamine like claritin, allegra or zyrtec.  Cool packs.  Reviewed s/s of secondary infection.  Go to the emergency room if any difficulty swallowing or breathing.AVS given and discussed:  Patient Instructions   Elevate  Cool packs  Leave open to the air as much as possible.  < use  If > redness beyond skin marker start antibiotic.  Eat yogurt daily while on antibiotic or take a probiotic.  If increase in redness, pain, discharge/drainage or any red streaks to seek prompt medical attention (clinic, urgent care or emergency department ).  [facility]  [phone]    Take an over the counter antihistamine like claritin, allegra or zyrtec.        Please FOLLOW UP at primary care clinic if not improving, new symptoms, worse or this does not resolve.  Pt is comfortable with this plan.  Electronically signed,  MARITA Corley, PAC

## 2017-09-22 NOTE — NURSING NOTE
"Chief Complaint   Patient presents with     Insect Bites     stung 3 days ago on left arm now red and swollen       Initial /65  Pulse 70  Temp 97.7  F (36.5  C) (Oral)  SpO2 96% Estimated body mass index is 32.45 kg/(m^2) as calculated from the following:    Height as of 9/10/17: 5' 5\" (1.651 m).    Weight as of 9/18/17: 195 lb (88.5 kg).  Medication Reconciliation: complete   Sarah Mata      "

## 2017-09-22 NOTE — MR AVS SNAPSHOT
"              After Visit Summary   9/22/2017    Mya Hui    MRN: [id number]           Patient Information     Date Of Birth          1940        Visit Information        Provider Department      9/22/2017 3:20 PM Lalitha Corley PA-C [facility]        Today's Diagnoses     Insect bite of arm, left, initial encounter    -  1    Cellulitis of left upper extremity          Care Instructions    Elevate  Cool packs  Leave open to the air as much as possible.  < use  If > redness beyond skin marker start antibiotic.  Eat yogurt daily while on antibiotic or take a probiotic.  If increase in redness, pain, discharge/drainage or any red streaks to seek prompt medical attention (clinic, urgent care or emergency department ).  [facility]  [phone]    Take an over the counter antihistamine like claritin, allegra or zyrtec.              Follow-ups after your visit        Your next 10 appointments already scheduled     Oct 09, 2017  1:00 PM CDT   Return Visit with Annamaria Carpio MD   [facility] ([facility])    [address] [place] [id number] [phone]              Who to contact     You can reach your care team any time of the day by calling [phone].  Notification of test results:  If you have an abnormal lab result, we will notify you by phone as soon as possible.         Additional Information About Your Visit        MyChart Information     [facility] lets you send messages to your doctor, view your test results, renew your prescriptions, schedule appointments and more. To sign up, go to [email or web address] . Click on \"Log in\" on the left side of the screen, which will take you to the Welcome page. Then click on \"Sign up Now\" on the right side of the page.     You will be asked to enter the access code listed below, as well as some personal information. Please follow the directions to create your username and " password.     Your access code is: [email or web address]  Expires: 2017 11:01 AM     Your access code will  in 90 days. If you need help or a new code, please call your [facility] or [phone].        Care EveryWhere ID     This is your Care EveryWhere ID. This could be used by other organizations to access your [facility] medical records  [id number]        Your Vitals Were     Pulse Temperature Pulse Oximetry             70 97.7  F (36.5  C) (Oral) 96%          Blood Pressure from Last 3 Encounters:   17 109/65   17 126/66   17 136/80    Weight from Last 3 Encounters:   17 195 lb (88.5 kg)   17 197 lb (89.4 kg)   09/10/17 199 lb 8.3 oz (90.5 kg)              Today, you had the following     No orders found for display         Today's Medication Changes          These changes are accurate as of: 17  3:48 PM.  If you have any questions, ask your nurse or doctor.               Start taking these medicines.        Dose/Directions    cephALEXin 500 MG capsule   Commonly known as:  KEFLEX   Used for:  Insect bite of arm, left, initial encounter, Cellulitis of left upper extremity   Started by:  Lalitha Corley, SAILAJA        Dose:  500 mg   Take 1 capsule (500 mg) by mouth 2 times daily   Quantity:  20 capsule   Refills:  0            Where to get your medicines      Some of these will need a paper prescription and others can be bought over the counter.  Ask your nurse if you have questions.     Bring a paper prescription for each of these medications     cephALEXin 500 MG capsule                Primary Care Provider Office Phone # Fax #    MAYI Anthony [phone]       [address] [place] [address]        Equal Access to Services     WILDA RIBEIRO AH: Janelle Call, wapaige granger, qaybta kaaljackie siddiqi, ct cuellar. [facility] [phone].    ATENCIÓN: Si habla español, tiene a pedro disposición  servicios gratuitos de asistencia lingüística. Shravan lennon [phone].    We comply with applicable federal civil rights laws and [place] laws. We do not discriminate on the basis of race, color, national origin, age, disability sex, sexual orientation or gender identity.            Thank you!     Thank you for choosing [facility]  for your care. Our goal is always to provide you with excellent care. Hearing back from our patients is one way we can continue to improve our services. Please take a few minutes to complete the written survey that you may receive in the mail after your visit with us. Thank you!             Your Updated Medication List - Protect others around you: Learn how to safely use, store and throw away your medicines at www.disposemymeds.org.          This list is accurate as of: 9/22/17  3:48 PM.  Always use your most recent med list.                   Brand Name Dispense Instructions for use Diagnosis    acetaminophen 325 MG tablet    TYLENOL    100 tablet    Take 2 tablets (650 mg) by mouth every 4 hours as needed for mild pain        amitriptyline 25 MG tablet    ELAVIL    90 tablet    Take 1 tablet (25 mg) by mouth At Bedtime    Insomnia, unspecified       blood glucose lancets standard    no brand specified    1 Box    Glucose test strips Use to test blood sugar 1 times daily or as directed.    Type 2 diabetes mellitus without complication, without long-term current use of insulin (H)       blood glucose monitoring test strip    no brand specified    100 strip    Use to test blood sugars 1 times daily or as directed    Type 2 diabetes mellitus without complication, without long-term current use of insulin (H)       cephALEXin 500 MG capsule    KEFLEX    20 capsule    Take 1 capsule (500 mg) by mouth 2 times daily    Insect bite of arm, left, initial encounter, Cellulitis of left upper extremity       diltiazem 120 MG 24 hr capsule    CARDIZEM CD/CARTIA XT    90 capsule     Take 1 capsule (120 mg) by mouth daily        levothyroxine 50 MCG tablet    SYNTHROID/LEVOTHROID    90 tablet    Take 1 tablet (50 mcg) by mouth daily    Hypothyroidism, unspecified type       lisinopril-hydrochlorothiazide 20-12.5 MG per tablet    PRINZIDE/ZESTORETIC    60 tablet    Take 1 tablet by mouth 2 times daily Do not restart until 9/12/17    Type 2 diabetes mellitus without complication, without long-term current use of insulin (H)       metFORMIN 500 MG 24 hr tablet    GLUCOPHAGE-XR    270 tablet    ONE TABLET BY MOUTH EVERY MORNING AND TAKE TWO TABLETS BY MOUTH EVERY EVENING WITH MEALS    Type 2 diabetes mellitus without complication, without long-term current use of insulin (H)       MULTIVITAL PO      Take  by mouth. daily        omeprazole 20 MG CR capsule    priLOSEC    30 capsule    Take 1 capsule (20 mg) by mouth every evening    Gastroesophageal reflux disease, esophagitis presence not specified       oxybutynin 5 MG 24 hr tablet    DITROPAN-XL     Take by mouth daily        pantoprazole 40 MG EC tablet    PROTONIX    180 tablet    Take 1 tablet (40 mg) by mouth 2 times daily Take 30-60 minutes before a meal.    Gastroesophageal reflux disease, esophagitis presence not specified       potassium chloride 10 MEQ tablet    K-TAB,KLOR-CON    90 tablet    Take 1 tablet (10 mEq) by mouth 3 times daily    Hypokalemia       pravastatin 40 MG tablet    PRAVACHOL    90 tablet    TAKE ONE TABLET BY MOUTH ONCE DAILY    Hyperlipidemia with target LDL less than 100

## 2017-09-22 NOTE — TELEPHONE ENCOUNTER
Mya Hui is a 77 year old female who calls with bee sting.    NURSING ASSESSMENT:  Description:  Bee sting, redness and warmth at the site  Onset/duration:  Stung two days ago  Precip. factors:  States its very warm to touch and red. Size of a 50 cent piece.  Allergies:   Allergies   Allergen Reactions     Codeine Nausea     Fentanyl Nausea and Vomiting     VERY sensitive to most narcotics if not all.       RECOMMENDED DISPOSITION:  See in 24 hours - recommended that Mya have it checked out at [facility] today.  Will comply with recommendation: Yes  If further questions/concerns or if symptoms do not improve, worsen or new symptoms develop, call your PCP or [facility] as soon as possible.      Guideline used:Bee Sting  Telephone Triage Protocols for Nurses, Fifth Edition, Liv Johns RN

## 2017-10-19 DIAGNOSIS — N32.81 OAB (OVERACTIVE BLADDER): ICD-10-CM

## 2017-10-23 NOTE — TELEPHONE ENCOUNTER
Routing refill request to provider for review/approval because:  Medication is reported/historical.    Pati Carmona RN

## 2017-10-24 DIAGNOSIS — E11.9 TYPE 2 DIABETES MELLITUS WITHOUT COMPLICATION, WITHOUT LONG-TERM CURRENT USE OF INSULIN (H): ICD-10-CM

## 2017-10-24 RX ORDER — OXYBUTYNIN CHLORIDE 5 MG/1
TABLET ORAL
Qty: 240 TABLET | Refills: 0 | Status: SHIPPED | OUTPATIENT
Start: 2017-10-24 | End: 2018-02-23

## 2017-10-24 RX ORDER — LISINOPRIL AND HYDROCHLOROTHIAZIDE 12.5; 2 MG/1; MG/1
1 TABLET ORAL 2 TIMES DAILY
Qty: 180 TABLET | Refills: 1 | Status: SHIPPED | OUTPATIENT
Start: 2017-10-24 | End: 2018-04-17 | Stop reason: DRUGHIGH

## 2017-10-24 NOTE — TELEPHONE ENCOUNTER
Lisinopril-HCTZ      Last Written Prescription Date:  9/11/17  Last Fill Quantity: 60,   # refills: 0  Future Office visit:       Routing refill request to provider for review/approval because:  Medication is reported/historical

## 2017-11-06 DIAGNOSIS — E87.6 HYPOKALEMIA: ICD-10-CM

## 2017-11-06 NOTE — TELEPHONE ENCOUNTER
Potassium       Last Written Prescription Date: 09/11/2017  Last Fill Quantity: 90,  # refills: 0   Last Office Visit with [facility], [facility] or [facility] prescribing provider: 09/18/2017    Thanks  Halle Talbot [facility]   [phone]

## 2017-11-07 ENCOUNTER — CARE COORDINATION (OUTPATIENT)
Dept: CARE COORDINATION | Facility: CLINIC | Age: 77
End: 2017-11-07

## 2017-11-07 RX ORDER — POTASSIUM CHLORIDE 750 MG/1
10 TABLET, EXTENDED RELEASE ORAL 3 TIMES DAILY
Qty: 90 TABLET | Refills: 0 | Status: SHIPPED | OUTPATIENT
Start: 2017-11-07 | End: 2017-12-06

## 2017-11-13 DIAGNOSIS — G47.00 INSOMNIA: ICD-10-CM

## 2017-11-17 NOTE — TELEPHONE ENCOUNTER
Prescription approved per [facility] Refill Protocol.  Dorina Johns RN

## 2017-11-20 NOTE — PROGRESS NOTES
Clinic Care Coordination Contact    Situation: Patient chart reviewed by care coordinator.    Background: RN CC received pt via CTS discharge.       Assessment: Pt had N/V and diarrhea which made her lab work off.  Since the pt has been following up with GI, Ortho and PCP and doing well.     Plan/Recommendations: will continue to monitor for 2-4 more weeks, outreach and potentially close to CC.     Pat TROTTER, RN, PHN  Care Coordination    [facility]  [address] [place] [address]  Office: [phone]  Fax [phone]   [facility]  [address] [place] [address]  Office: [phone] Fax [phone]  [email or web address]   [email or web address]   Connect with [facility] on social media.

## 2017-12-06 DIAGNOSIS — E87.6 HYPOKALEMIA: ICD-10-CM

## 2017-12-06 RX ORDER — POTASSIUM CHLORIDE 750 MG/1
10 TABLET, EXTENDED RELEASE ORAL 3 TIMES DAILY
Qty: 90 TABLET | Refills: 0 | Status: SHIPPED | OUTPATIENT
Start: 2017-12-06 | End: 2018-01-08

## 2017-12-06 NOTE — TELEPHONE ENCOUNTER
Potassium Chloride ER 10 MG      Last Written Prescription Date:  11/7/17  Last Fill Quantity: 90,   # refills: 0  Last Office Visit: 9/18/17  Future Office visit:    Next 5 appointments (look out 90 days)     Dec 11, 2017  1:50 PM CST   [facility]    [address] [place] [id number]   [phone]                   Routing refill request to provider for review/approval because:  Drug not on the [facility], [facility] or  Health refill protocol or controlled substance

## 2017-12-11 ENCOUNTER — OFFICE VISIT (OUTPATIENT)
Dept: ORTHOPEDICS | Facility: CLINIC | Age: 77
End: 2017-12-11
Payer: COMMERCIAL

## 2017-12-11 VITALS — TEMPERATURE: 97.2 F | BODY MASS INDEX: 33.32 KG/M2 | WEIGHT: 200 LBS | HEIGHT: 65 IN

## 2017-12-11 DIAGNOSIS — M12.811 ROTATOR CUFF ARTHROPATHY, RIGHT: Primary | ICD-10-CM

## 2017-12-11 PROCEDURE — 20610 DRAIN/INJ JOINT/BURSA W/O US: CPT | Mod: RT | Performed by: ORTHOPAEDIC SURGERY

## 2017-12-11 ASSESSMENT — PAIN SCALES - GENERAL: PAINLEVEL: MILD PAIN (3)

## 2017-12-11 NOTE — LETTER
"    12/11/2017         RE: Mya Hui  [address]  [place] [address]        Dear Colleague,    Thank you for referring your patient, Mya Hui, to the [facility]. Please see a copy of my visit note below.    Office Visit-Follow up    Chief Complaint: Mya Hui is a 77 year old female who is being seen for   Chief Complaint   Patient presents with     RECHECK     f/u rt shoulder lov 8/28/17 inj given       History of Present Illness:   Injection in august helped a lot  Made cookies recently with some return of pain  Pain 3/10      REVIEW OF SYSTEMS  General: negative for, night sweats, dizziness, fatigue  Resp: No shortness of breath and no cough  CV: negative for chest pain, syncope or near-syncope  GI: negative for nausea, vomiting and diarrhea  : negative for dysuria and hematuria  Musculoskeletal: as above  Neurologic: negative for syncope   Hematologic: negative for bleeding disorder    Physical Exam:  Vitals: Temp 97.2  F (36.2  C)  Ht 1.651 m (5' 5\")  Wt 90.7 kg (200 lb)  BMI 33.28 kg/m2  BMI= Body mass index is 33.28 kg/(m^2).  Constitutional: healthy, alert and no acute distress   Psychiatric: mentation appears normal and affect normal/bright  NEURO: no focal deficits  RESP: Normal with easy respirations and no use of accessory muscles to breathe, no audible wheezing or retractions  CV: No peripheral edema  SKIN: No erythema, rashes, excoriation, or breakdown. No evidence of infection.   JOINT/EXTREMITIES:right shoulder - FROM, crepitus, well-compensated strength on rotator cuff testing  GAIT: non-antalgic            Diagnostic Modalities:  None today.        Impression: right Rotator cuff arthropathy    Plan:  All of the above pertinent physical exam and imaging modalities findings was reviewed with Mya.                                          CONSERVATIVE CARE:  I recommend conservative care for the patient to include NSAIDs, Tylenol, focused self " directed physical therapy, steroid injections, activity modifications.                                         INJECTION PROCEDURE:  The patient was counseled about an  injection, including discussion of risks (including infection), contents of the injection, rationale for performing the injection, and expected benefits of the injection. The skin was prepped with alcohol and betadine and then utilizing sterile technique an injection of the right shoulder subacromial space from the anterolateral approach  was performed. The injection consisted 1ml of Kenalog (40mg per 1ml) with 8ml 1% lidocaine plain. The patient tolerated the injection well, and there were no complications. The injection site was covered with a Band-Aid. The injection was performed by Annamaria Carpio M.D.                                                FUTURE PLAN:  On their return if they still have symptoms we will consider injection of steroids.      Return to clinic PRN, or sooner as needed for changes.  Re-x-ray on return: No    Annamaria Carpio M.D.          Again, thank you for allowing me to participate in the care of your patient.        Sincerely,        Annamaria Carpio MD

## 2017-12-11 NOTE — MR AVS SNAPSHOT
"              After Visit Summary   2017    Mya Hui    MRN: [id number]           Patient Information     Date Of Birth          1940        Visit Information        Provider Department      2017 1:50 PM Annamaria Carpio MD [facility]        Today's Diagnoses     Rotator cuff arthropathy, right    -  1       Follow-ups after your visit        Who to contact     If you have questions or need follow up information about today's clinic visit or your schedule please contact [facility] directly at [phone].  Normal or non-critical lab and imaging results will be communicated to you by AvantBiohart, letter or phone within 4 business days after the clinic has received the results. If you do not hear from us within 7 days, please contact the clinic through [email or web address] or phone. If you have a critical or abnormal lab result, we will notify you by phone as soon as possible.  Submit refill requests through GenKyoTex or call your pharmacy and they will forward the refill request to us. Please allow 3 business days for your refill to be completed.          Additional Information About Your Visit        MyChart Information     GenKyoTex lets you send messages to your doctor, view your test results, renew your prescriptions, schedule appointments and more. To sign up, go to [email or web address] . Click on \"Log in\" on the left side of the screen, which will take you to the Welcome page. Then click on \"Sign up Now\" on the right side of the page.     You will be asked to enter the access code listed below, as well as some personal information. Please follow the directions to create your username and password.     Your access code is: [email or web address]  Expires: 3/11/2018  4:22 PM     Your access code will  in 90 days. If you need help or a new code, please call your [facility] or [phone].        Care EveryWhere ID     This is your Care EveryWhere ID. This could be used " "by other organizations to access your [place] medical records  [id number]        Your Vitals Were     Temperature Height BMI (Body Mass Index)             97.2  F (36.2  C) 1.651 m (5' 5\") 33.28 kg/m2          Blood Pressure from Last 3 Encounters:   09/22/17 109/65   09/18/17 126/66   09/14/17 136/80    Weight from Last 3 Encounters:   12/11/17 90.7 kg (200 lb)   09/18/17 88.5 kg (195 lb)   09/14/17 89.4 kg (197 lb)              We Performed the Following     Kenalog 40 MG  []     Large Joint/Bursa injection and/or drainage (Shoulder, Knee)        Primary Care Provider Office Phone # Fax #    Chanell MAYI [facility] [phone] [address] [place] [address]        Equal Access to Services     [facility]: Hadii aad ku hadasho Somahi, waaxda luqadaha, qaybta kaalmada adeegyada, waxay kimberlyin hayramon johnson . So [facility] [phone].    ATENCIÓN: Si habla español, tiene a pedro disposición servicios gratuitos de asistencia lingüística. Llame al [phone].    We comply with applicable federal civil rights laws and [place] laws. We do not discriminate on the basis of race, color, national origin, age, disability, sex, sexual orientation, or gender identity.            Thank you!     Thank you for choosing [facility]  for your care. Our goal is always to provide you with excellent care. Hearing back from our patients is one way we can continue to improve our services. Please take a few minutes to complete the written survey that you may receive in the mail after your visit with us. Thank you!             Your Updated Medication List - Protect others around you: Learn how to safely use, store and throw away your medicines at www.disposemymeds.org.          This list is accurate as of: 12/11/17  4:22 PM.  Always use your most recent med list.                   Brand Name Dispense Instructions for use Diagnosis    acetaminophen 325 MG tablet    TYLENOL "    100 tablet    Take 2 tablets (650 mg) by mouth every 4 hours as needed for mild pain        amitriptyline 25 MG tablet    ELAVIL    90 tablet    TAKE ONE TABLET BY MOUTH AT BEDTIME    Insomnia       blood glucose lancets standard    no brand specified    1 Box    Glucose test strips Use to test blood sugar 1 times daily or as directed.    Type 2 diabetes mellitus without complication, without long-term current use of insulin (H)       blood glucose monitoring test strip    no brand specified    100 strip    Use to test blood sugars 1 times daily or as directed    Type 2 diabetes mellitus without complication, without long-term current use of insulin (H)       cephALEXin 500 MG capsule    KEFLEX    20 capsule    Take 1 capsule (500 mg) by mouth 2 times daily    Insect bite of arm, left, initial encounter, Cellulitis of left upper extremity       diltiazem 120 MG 24 hr capsule    CARDIZEM CD/CARTIA XT    90 capsule    Take 1 capsule (120 mg) by mouth daily        levothyroxine 50 MCG tablet    SYNTHROID/LEVOTHROID    90 tablet    Take 1 tablet (50 mcg) by mouth daily    Hypothyroidism, unspecified type       lisinopril-hydrochlorothiazide 20-12.5 MG per tablet    PRINZIDE/ZESTORETIC    180 tablet    Take 1 tablet by mouth 2 times daily Do not restart until 9/12/17    Type 2 diabetes mellitus without complication, without long-term current use of insulin (H)       metFORMIN 500 MG 24 hr tablet    GLUCOPHAGE-XR    270 tablet    ONE TABLET BY MOUTH EVERY MORNING AND TAKE TWO TABLETS BY MOUTH EVERY EVENING WITH MEALS    Type 2 diabetes mellitus without complication, without long-term current use of insulin (H)       MULTIVITAL PO      Take  by mouth. daily        omeprazole 20 MG CR capsule    priLOSEC    30 capsule    Take 1 capsule (20 mg) by mouth every evening    Gastroesophageal reflux disease, esophagitis presence not specified       * oxybutynin 5 MG 24 hr tablet    DITROPAN-XL     Take by mouth daily        *  oxybutynin 5 MG tablet    DITROPAN    240 tablet    TAKE TWO TABLETS (10MG) BY MOUTH TWICE A DAY AS NEEDED FOR BLADDER SPASMS    OAB (overactive bladder)       pantoprazole 40 MG EC tablet    PROTONIX    180 tablet    Take 1 tablet (40 mg) by mouth 2 times daily Take 30-60 minutes before a meal.    Gastroesophageal reflux disease, esophagitis presence not specified       potassium chloride 10 MEQ tablet    K-TAB,KLOR-CON    90 tablet    Take 1 tablet (10 mEq) by mouth 3 times daily    Hypokalemia       pravastatin 40 MG tablet    PRAVACHOL    90 tablet    TAKE ONE TABLET BY MOUTH ONCE DAILY    Hyperlipidemia with target LDL less than 100       * Notice:  This list has 2 medication(s) that are the same as other medications prescribed for you. Read the directions carefully, and ask your doctor or other care provider to review them with you.

## 2017-12-11 NOTE — NURSING NOTE
"Chief Complaint   Patient presents with     RECHECK     f/u rt shoulder lov 8/28/17 inj given       Initial Temp 97.2  F (36.2  C)  Ht 1.651 m (5' 5\")  Wt 90.7 kg (200 lb)  BMI 33.28 kg/m2 Estimated body mass index is 33.28 kg/(m^2) as calculated from the following:    Height as of this encounter: 1.651 m (5' 5\").    Weight as of this encounter: 90.7 kg (200 lb).  Medication Reconciliation: complete    BP completed using cuff size: NA (Not Taken)      Sheree George MA      "

## 2017-12-11 NOTE — PROGRESS NOTES
"Office Visit-Follow up    Chief Complaint: Mya Hui is a 77 year old female who is being seen for   Chief Complaint   Patient presents with     RECHECK     f/u rt shoulder lov 8/28/17 inj given       History of Present Illness:   Injection in august helped a lot  Made cookies recently with some return of pain  Pain 3/10      REVIEW OF SYSTEMS  General: negative for, night sweats, dizziness, fatigue  Resp: No shortness of breath and no cough  CV: negative for chest pain, syncope or near-syncope  GI: negative for nausea, vomiting and diarrhea  : negative for dysuria and hematuria  Musculoskeletal: as above  Neurologic: negative for syncope   Hematologic: negative for bleeding disorder    Physical Exam:  Vitals: Temp 97.2  F (36.2  C)  Ht 1.651 m (5' 5\")  Wt 90.7 kg (200 lb)  BMI 33.28 kg/m2  BMI= Body mass index is 33.28 kg/(m^2).  Constitutional: healthy, alert and no acute distress   Psychiatric: mentation appears normal and affect normal/bright  NEURO: no focal deficits  RESP: Normal with easy respirations and no use of accessory muscles to breathe, no audible wheezing or retractions  CV: No peripheral edema  SKIN: No erythema, rashes, excoriation, or breakdown. No evidence of infection.   JOINT/EXTREMITIES:right shoulder - FROM, crepitus, well-compensated strength on rotator cuff testing  GAIT: non-antalgic            Diagnostic Modalities:  None today.        Impression: right Rotator cuff arthropathy    Plan:  All of the above pertinent physical exam and imaging modalities findings was reviewed with Mya.                                          CONSERVATIVE CARE:  I recommend conservative care for the patient to include NSAIDs, Tylenol, focused self directed physical therapy, steroid injections, activity modifications.                                         INJECTION PROCEDURE:  The patient was counseled about an  injection, including discussion of risks (including infection), contents of the " injection, rationale for performing the injection, and expected benefits of the injection. The skin was prepped with alcohol and betadine and then utilizing sterile technique an injection of the right shoulder subacromial space from the anterolateral approach  was performed. The injection consisted 1ml of Kenalog (40mg per 1ml) with 8ml 1% lidocaine plain. The patient tolerated the injection well, and there were no complications. The injection site was covered with a Band-Aid. The injection was performed by Annamaria Carpio M.D.                                                FUTURE PLAN:  On their return if they still have symptoms we will consider injection of steroids.      Return to clinic PRN, or sooner as needed for changes.  Re-x-ray on return: No    Annamaria Carpio M.D.

## 2017-12-20 ENCOUNTER — TRANSFERRED RECORDS (OUTPATIENT)
Dept: HEALTH INFORMATION MANAGEMENT | Facility: CLINIC | Age: 77
End: 2017-12-20

## 2018-01-04 ENCOUNTER — OFFICE VISIT (OUTPATIENT)
Dept: FAMILY MEDICINE | Facility: CLINIC | Age: 78
End: 2018-01-04
Payer: COMMERCIAL

## 2018-01-04 VITALS
SYSTOLIC BLOOD PRESSURE: 130 MMHG | DIASTOLIC BLOOD PRESSURE: 84 MMHG | WEIGHT: 198 LBS | HEIGHT: 65 IN | BODY MASS INDEX: 32.99 KG/M2 | TEMPERATURE: 98.2 F | HEART RATE: 80 BPM

## 2018-01-04 DIAGNOSIS — S90.425A BLISTER OF TOE OF LEFT FOOT WITH INFECTION, INITIAL ENCOUNTER: Primary | ICD-10-CM

## 2018-01-04 DIAGNOSIS — H61.21 IMPACTED CERUMEN OF RIGHT EAR: ICD-10-CM

## 2018-01-04 DIAGNOSIS — L08.9 BLISTER OF TOE OF LEFT FOOT WITH INFECTION, INITIAL ENCOUNTER: Primary | ICD-10-CM

## 2018-01-04 PROCEDURE — 99213 OFFICE O/P EST LOW 20 MIN: CPT | Performed by: NURSE PRACTITIONER

## 2018-01-04 RX ORDER — CEPHALEXIN 500 MG/1
500 CAPSULE ORAL 2 TIMES DAILY
Qty: 14 CAPSULE | Refills: 0 | Status: SHIPPED | OUTPATIENT
Start: 2018-01-04 | End: 2018-01-11

## 2018-01-04 NOTE — PROGRESS NOTES
"  SUBJECTIVE:   Mya Hui is a 77 year old female who presents to clinic today for the following health issues:      Concern - sore toes/nails  Onset: couple weeks    Description:   ?ingrown toenails    Intensity: mild, moderate    Progression of Symptoms:  same    Accompanying Signs & Symptoms:  none    Previous history of similar problem:   none    Precipitating factors:   Worsened by: wearing shoes    Alleviating factors:  Improved by: none    Therapies Tried and outcome: none      Mya is seen in clinic today with some tenderness at the tip of the left great toe with some swelling, and also some redness and tenderness along the medial aspect of the right second toe.  She had a previous bunion surgery done on the right foot, but the toe still angulates laterally and puts pressure against that second toe.  She also has hallux valgus of the left great toe, has not had any type of surgery on that foot    She also states her ears feel plugged and she would like them checked today    Problem list and histories reviewed & adjusted, as indicated.  Additional history: as documented    BP Readings from Last 3 Encounters:   01/04/18 130/84   09/22/17 109/65   09/18/17 126/66    Wt Readings from Last 3 Encounters:   01/04/18 198 lb (89.8 kg)   12/11/17 200 lb (90.7 kg)   09/18/17 195 lb (88.5 kg)                      Reviewed and updated as needed this visit by clinical staff     Reviewed and updated as needed this visit by Provider         ROS:  Constitutional, HEENT, cardiovascular, pulmonary, gi and gu systems are negative, except as otherwise noted.      OBJECTIVE:   /84  Pulse 80  Temp 98.2  F (36.8  C) (Tympanic)  Ht 5' 5\" (1.651 m)  Wt 198 lb (89.8 kg)  BMI 32.95 kg/m2  Body mass index is 32.95 kg/(m^2).   GENERAL: healthy, alert and no distress  HENT: Both ear canals contain moderate amount of brown cerumen.  MS: Focused exam of bilateral feet: Normal pulses, normal sensation.  No edema, with the " exception of the tip of the left great toe.  She has tenderness and erythema along the lateral aspect of the nail edge.  And no fluctuance, no purulent discharge.  The right great toe tends to deviate laterally, putting pressure against that second toe.  The medial edge of the second toe on the right foot is erythematous.  There is no edema, no fluctuance.  This does not appear to be infected.        ASSESSMENT/PLAN:     Problem List Items Addressed This Visit     None      Visit Diagnoses     Blister of toe of left foot with infection, initial encounter    -  Primary    Relevant Medications    cephALEXin (KEFLEX) 500 MG capsule    Impacted cerumen of right ear               Ears were irrigated by nursing staff, canals are clear.  Suggested she soak it her toes in warm water, may use Epsom salts if desired.  We will also have her take Keflex twice daily for 7 days for the apparent infection of the right great toe.  They advised her to get some Dr. Arenas's pads to put between the great toes and second toes on each foot to alleviate pressure.  If this is an ongoing issue and  toes keep rubbing, she needs to see podiatry    MAYI [facility]

## 2018-01-04 NOTE — MR AVS SNAPSHOT
"              After Visit Summary   2018    Mya Hui    MRN: [id number]           Patient Information     Date Of Birth          1940        Visit Information        Provider Department      2018 11:00 AM Chanell Nelson APRN CNP [facility]        Today's Diagnoses     Blister of toe of left foot with infection, initial encounter    -  1    Impacted cerumen of right ear           Follow-ups after your visit        Who to contact     If you have questions or need follow up information about today's clinic visit or your schedule please contact [facility] directly at [phone].  Normal or non-critical lab and imaging results will be communicated to you by NOTIKhart, letter or phone within 4 business days after the clinic has received the results. If you do not hear from us within 7 days, please contact the clinic through NOTIKhart or phone. If you have a critical or abnormal lab result, we will notify you by phone as soon as possible.  Submit refill requests through Autotether or call your pharmacy and they will forward the refill request to us. Please allow 3 business days for your refill to be completed.          Additional Information About Your Visit        MyChart Information     Autotether lets you send messages to your doctor, view your test results, renew your prescriptions, schedule appointments and more. To sign up, go to [email or web address] . Click on \"Log in\" on the left side of the screen, which will take you to the Welcome page. Then click on \"Sign up Now\" on the right side of the page.     You will be asked to enter the access code listed below, as well as some personal information. Please follow the directions to create your username and password.     Your access code is: [email or web address]  Expires: 3/11/2018  4:22 PM     Your access code will  in 90 days. If you need help or a new code, please call your [facility] or [phone].        Care " "EveryWhere ID     This is your Care EveryWhere ID. This could be used by other organizations to access your [place] medical records  [id number]        Your Vitals Were     Pulse Temperature Height BMI (Body Mass Index)          80 98.2  F (36.8  C) (Tympanic) 5' 5\" (1.651 m) 32.95 kg/m2         Blood Pressure from Last 3 Encounters:   01/04/18 130/84   09/22/17 109/65   09/18/17 126/66    Weight from Last 3 Encounters:   01/04/18 198 lb (89.8 kg)   12/11/17 200 lb (90.7 kg)   09/18/17 195 lb (88.5 kg)              Today, you had the following     No orders found for display         Today's Medication Changes          These changes are accurate as of: 1/4/18 11:20 AM.  If you have any questions, ask your nurse or doctor.               Start taking these medicines.        Dose/Directions    cephALEXin 500 MG capsule   Commonly known as:  KEFLEX   Used for:  Blister of toe of left foot with infection, initial encounter        Dose:  500 mg   Take 1 capsule (500 mg) by mouth 2 times daily for 7 days   Quantity:  14 capsule   Refills:  0            Where to get your medicines      These medications were sent to [id number] - [place], [place] - [address]  [address] [place] [id number]    Hours:  Formerly [place] - numbers unchanged   9/8/03  Phone:  [phone]     cephALEXin 500 MG capsule                Primary Care Provider Office Phone # Fax #    Chanell Nelson, MAYI [facility] [phone]       [address] [place] [address]        Equal Access to Services     : Hadii herbert hansen Somahi, waaxda luqadaha, qaybta kaalmada devang, ct johnson . So [facility] [phone].    ATENCIÓN: Si habla español, tiene a pedro disposición servicios gratuitos de asistencia lingüística. Llame al [phone].    We comply with applicable federal civil rights laws and [place] laws. We do not discriminate on the basis of race, color, national origin, age, " disability, sex, sexual orientation, or gender identity.            Thank you!     Thank you for choosing [facility]  for your care. Our goal is always to provide you with excellent care. Hearing back from our patients is one way we can continue to improve our services. Please take a few minutes to complete the written survey that you may receive in the mail after your visit with us. Thank you!             Your Updated Medication List - Protect others around you: Learn how to safely use, store and throw away your medicines at www.disposemymeds.org.          This list is accurate as of: 1/4/18 11:20 AM.  Always use your most recent med list.                   Brand Name Dispense Instructions for use Diagnosis    acetaminophen 325 MG tablet    TYLENOL    100 tablet    Take 2 tablets (650 mg) by mouth every 4 hours as needed for mild pain        amitriptyline 25 MG tablet    ELAVIL    90 tablet    TAKE ONE TABLET BY MOUTH AT BEDTIME    Insomnia       blood glucose lancets standard    no brand specified    1 Box    Glucose test strips Use to test blood sugar 1 times daily or as directed.    Type 2 diabetes mellitus without complication, without long-term current use of insulin (H)       blood glucose monitoring test strip    no brand specified    100 strip    Use to test blood sugars 1 times daily or as directed    Type 2 diabetes mellitus without complication, without long-term current use of insulin (H)       cephALEXin 500 MG capsule    KEFLEX    14 capsule    Take 1 capsule (500 mg) by mouth 2 times daily for 7 days    Blister of toe of left foot with infection, initial encounter       diltiazem 120 MG 24 hr capsule    CARDIZEM CD/CARTIA XT    90 capsule    Take 1 capsule (120 mg) by mouth daily        levothyroxine 50 MCG tablet    SYNTHROID/LEVOTHROID    90 tablet    Take 1 tablet (50 mcg) by mouth daily    Hypothyroidism, unspecified type       lisinopril-hydrochlorothiazide 20-12.5 MG per tablet     PRINZIDE/ZESTORETIC    180 tablet    Take 1 tablet by mouth 2 times daily Do not restart until 9/12/17    Type 2 diabetes mellitus without complication, without long-term current use of insulin (H)       metFORMIN 500 MG 24 hr tablet    GLUCOPHAGE-XR    270 tablet    ONE TABLET BY MOUTH EVERY MORNING AND TAKE TWO TABLETS BY MOUTH EVERY EVENING WITH MEALS    Type 2 diabetes mellitus without complication, without long-term current use of insulin (H)       MULTIVITAL PO      Take  by mouth. daily        omeprazole 20 MG CR capsule    priLOSEC    30 capsule    Take 1 capsule (20 mg) by mouth every evening    Gastroesophageal reflux disease, esophagitis presence not specified       * oxybutynin 5 MG 24 hr tablet    DITROPAN-XL     Take by mouth daily        * oxybutynin 5 MG tablet    DITROPAN    240 tablet    TAKE TWO TABLETS (10MG) BY MOUTH TWICE A DAY AS NEEDED FOR BLADDER SPASMS    OAB (overactive bladder)       pantoprazole 40 MG EC tablet    PROTONIX    180 tablet    Take 1 tablet (40 mg) by mouth 2 times daily Take 30-60 minutes before a meal.    Gastroesophageal reflux disease, esophagitis presence not specified       potassium chloride 10 MEQ tablet    K-TAB,KLOR-CON    90 tablet    Take 1 tablet (10 mEq) by mouth 3 times daily    Hypokalemia       pravastatin 40 MG tablet    PRAVACHOL    90 tablet    TAKE ONE TABLET BY MOUTH ONCE DAILY    Hyperlipidemia with target LDL less than 100       * Notice:  This list has 2 medication(s) that are the same as other medications prescribed for you. Read the directions carefully, and ask your doctor or other care provider to review them with you.

## 2018-01-05 ENCOUNTER — OFFICE VISIT (OUTPATIENT)
Dept: ORTHOPEDICS | Facility: CLINIC | Age: 78
End: 2018-01-05
Payer: COMMERCIAL

## 2018-01-05 ENCOUNTER — RADIANT APPOINTMENT (OUTPATIENT)
Dept: GENERAL RADIOLOGY | Facility: CLINIC | Age: 78
End: 2018-01-05
Attending: ORTHOPAEDIC SURGERY
Payer: COMMERCIAL

## 2018-01-05 VITALS — HEIGHT: 65 IN | BODY MASS INDEX: 32.99 KG/M2 | WEIGHT: 198 LBS | TEMPERATURE: 97.8 F

## 2018-01-05 DIAGNOSIS — M25.511 SHOULDER PAIN, RIGHT: ICD-10-CM

## 2018-01-05 DIAGNOSIS — M12.811 ROTATOR CUFF ARTHROPATHY, RIGHT: Primary | ICD-10-CM

## 2018-01-05 PROCEDURE — 73030 X-RAY EXAM OF SHOULDER: CPT | Mod: TC

## 2018-01-05 PROCEDURE — 99213 OFFICE O/P EST LOW 20 MIN: CPT | Performed by: ORTHOPAEDIC SURGERY

## 2018-01-05 ASSESSMENT — PAIN SCALES - GENERAL: PAINLEVEL: EXTREME PAIN (8)

## 2018-01-05 NOTE — NURSING NOTE
"Chief Complaint   Patient presents with     Consult     rt shoulder pain, fell yesterday        Initial Temp 97.8  F (36.6  C)  Ht 1.651 m (5' 5\")  Wt 89.8 kg (198 lb)  BMI 32.95 kg/m2 Estimated body mass index is 32.95 kg/(m^2) as calculated from the following:    Height as of this encounter: 1.651 m (5' 5\").    Weight as of this encounter: 89.8 kg (198 lb).  Medication Reconciliation: complete    BP completed using cuff size: NA (Not Taken)    Sheree George MA      "

## 2018-01-05 NOTE — PROGRESS NOTES
Appropriate assistive devices provided during their visit. none (Yes, No, N/A) none (list device)    Exam table and/or cart  placed in the lowest position. yes (Yes, No, N/A)    Brakes on tables/carts/wheelchairs used at all times. yes (Yes, No, N/A)    Non slip footwear applied. n/a (Yes, No, NA)    Patient was accompanied by staff throughout visit. yes (Yes, No, N/A)    Equipment safety straps used. n/a (Yes, No, N/A)    Assist with toileting. n/a (Yes, No, N/A)

## 2018-01-05 NOTE — LETTER
"    1/5/2018         RE: Mya Hui  [address]  [place] [address]        Dear Colleague,    Thank you for referring your patient, Mya Hui, to the [facility]. Please see a copy of my visit note below.    Office Visit-Follow up    Chief Complaint: Mya Hui is a 77 year old female who is being seen for   Chief Complaint   Patient presents with     Consult     rt shoulder pain, fell yesterday        History of Present Illness:   Injection 25 days ago helped a lot  Fell yesterday and wants to get shoulder checked out  Took tylenol which seemed to help      REVIEW OF SYSTEMS  General: negative for, night sweats, dizziness, fatigue  Resp: No shortness of breath and no cough  CV: negative for chest pain, syncope or near-syncope  GI: negative for nausea, vomiting and diarrhea  : negative for dysuria and hematuria  Musculoskeletal: as above  Neurologic: negative for syncope   Hematologic: negative for bleeding disorder    Physical Exam:  Vitals: Temp 97.8  F (36.6  C)  Ht 1.651 m (5' 5\")  Wt 89.8 kg (198 lb)  BMI 32.95 kg/m2  BMI= Body mass index is 32.95 kg/(m^2).  Constitutional: healthy, alert and no acute distress   Psychiatric: mentation appears normal and affect normal/bright  NEURO: no focal deficits  RESP: Normal with easy respirations and no use of accessory muscles to breathe, no audible wheezing or retractions  CV: No peripheral edema  SKIN: No erythema, rashes, excoriation, or breakdown. No evidence of infection.   JOINT/EXTREMITIES:right shoulder - sore, decent ROM, moderate pain, FF 90, ER 10  GAIT: not tested             Diagnostic Modalities:  right shoulder X-ray: No fracture, dislocation and or lesion. , with moderate glenohumeral joint space narrowing  Independent visualization of the images was performed.      Impression: right Rotator cuff arthropathy  flareup after fall    Plan:  All of the above pertinent physical exam and imaging modalities " findings was reviewed with Mya and her granddaughter.                                          CONSERVATIVE CARE:  I recommend conservative care for the patient to include Tylenol, activity modifications, rest.                                                 FUTURE PLAN:  On their return if they still have symptoms we will consider injection of steroids.      Return to clinic 2, week(s), PRN, or sooner as needed for changes.  Re-x-ray on return: No    Annamaria Carpio M.D.          Again, thank you for allowing me to participate in the care of your patient.        Sincerely,        Annamaria Carpio MD

## 2018-01-05 NOTE — PROGRESS NOTES
"Office Visit-Follow up    Chief Complaint: Mya Hui is a 77 year old female who is being seen for   Chief Complaint   Patient presents with     Consult     rt shoulder pain, fell yesterday        History of Present Illness:   Injection 25 days ago helped a lot  Fell yesterday and wants to get shoulder checked out  Took tylenol which seemed to help      REVIEW OF SYSTEMS  General: negative for, night sweats, dizziness, fatigue  Resp: No shortness of breath and no cough  CV: negative for chest pain, syncope or near-syncope  GI: negative for nausea, vomiting and diarrhea  : negative for dysuria and hematuria  Musculoskeletal: as above  Neurologic: negative for syncope   Hematologic: negative for bleeding disorder    Physical Exam:  Vitals: Temp 97.8  F (36.6  C)  Ht 1.651 m (5' 5\")  Wt 89.8 kg (198 lb)  BMI 32.95 kg/m2  BMI= Body mass index is 32.95 kg/(m^2).  Constitutional: healthy, alert and no acute distress   Psychiatric: mentation appears normal and affect normal/bright  NEURO: no focal deficits  RESP: Normal with easy respirations and no use of accessory muscles to breathe, no audible wheezing or retractions  CV: No peripheral edema  SKIN: No erythema, rashes, excoriation, or breakdown. No evidence of infection.   JOINT/EXTREMITIES:right shoulder - sore, decent ROM, moderate pain, FF 90, ER 10  GAIT: not tested             Diagnostic Modalities:  right shoulder X-ray: No fracture, dislocation and or lesion. , with moderate glenohumeral joint space narrowing  Independent visualization of the images was performed.      Impression: right Rotator cuff arthropathy  flareup after fall    Plan:  All of the above pertinent physical exam and imaging modalities findings was reviewed with Mya and her granddaughter.                                          CONSERVATIVE CARE:  I recommend conservative care for the patient to include Tylenol, activity modifications, rest.                                        "          FUTURE PLAN:  On their return if they still have symptoms we will consider injection of steroids.      Return to clinic 2, week(s), PRN, or sooner as needed for changes.  Re-x-ray on return: No    Annamaria Carpio M.D.

## 2018-01-10 NOTE — ADDENDUM NOTE
Encounter addended by: Nayeli Locke, PT on: 1/10/2018  8:31 AM<BR>     Actions taken: Episode resolved, Pend clinical note, Sign clinical note

## 2018-01-10 NOTE — PROGRESS NOTES
Outpatient Physical Therapy Discharge Note     Patient: Mya Hui    : 1940    Beginning/End Dates of Reporting Period: 17-17    Referring Provider: Balaji Mena Diagnosis: faulty posture, SH pain     Client Self Report: 100% improvement since injection of R SH, MRI shows full thickness supraspinatus tear, possible biceps tear or subluxation and arthritic changes.    Objective Measurements:  Objective Measure: pain  Details: 0/10 now since injection, was a 10/10 at worst  Objective Measure: SPADI  Details: 0.77%    Goals:  Goal Identifier 1   Goal Description Pt will report pain no greater than 2/10 with overhead reaching and housework tasks.   Target Date 17   Date Met      Progress:     Goal Identifier 2   Goal Description Pt will demonstrate independence with HEP and postural modifcations/protective SH techniques in order to improve overall sx and posture.   Target Date 17   Date Met      Progress:     Progress Toward Goals:   Pt did not return after evaluation.    Plan: Discharge from therapy.    Discharge:  Reason for Discharge: Medicare G-code: Patient did not attend a final scheduled session prior to discharge. Unable to determine discharge functional status. Never returned after evaluation    Equipment Issued: none    Discharge Plan: follow up with provider

## 2018-01-30 DIAGNOSIS — K21.9 GASTROESOPHAGEAL REFLUX DISEASE, ESOPHAGITIS PRESENCE NOT SPECIFIED: ICD-10-CM

## 2018-01-30 RX ORDER — PANTOPRAZOLE SODIUM 40 MG/1
40 TABLET, DELAYED RELEASE ORAL
Qty: 180 TABLET | Refills: 1 | Status: SHIPPED | OUTPATIENT
Start: 2018-01-30 | End: 2018-05-02

## 2018-01-30 NOTE — TELEPHONE ENCOUNTER
Last Written Prescription Date:  9/14/17  Last Fill Quantity: 180,  # refills: 1   Last Office Visit with [facility] provider:  1/4/18   Future Office Visit:         Refill was marked urgent, routed to Chanell To sign and send.

## 2018-02-12 ENCOUNTER — CARE COORDINATION (OUTPATIENT)
Dept: FAMILY MEDICINE | Facility: CLINIC | Age: 78
End: 2018-02-12

## 2018-02-12 NOTE — PROGRESS NOTES
Clinic Care Coordination Contact    Situation: Patient chart reviewed by care coordinator.    Background: Received a CTS back in sept/17 for nausea and vomiting.     Assessment: Pt has been doing well without any further ED/hospitalizations.  A1c is 6.1    Plan/Recommendations: No further monitoring or outreach at this time.     Pat TROTTER, RN, PHN  Care Coordination    [facility]  [address] [place] [address]  Office: [phone]  Fax [phone]   [facility]  [address] [place] [address]  Office: [phone] Fax [phone]  [email or web address]   [email or web address]   Connect with [facility] on social media.

## 2018-03-08 DIAGNOSIS — I10 ESSENTIAL HYPERTENSION WITH GOAL BLOOD PRESSURE LESS THAN 140/90: Primary | ICD-10-CM

## 2018-03-12 NOTE — TELEPHONE ENCOUNTER
Routing refill request to provider for review/approval because:  Labs out of range:  Cr 1.16  Dorina Johns RN

## 2018-03-13 RX ORDER — DILTIAZEM HYDROCHLORIDE 120 MG/1
CAPSULE, EXTENDED RELEASE ORAL
Qty: 90 CAPSULE | Refills: 2 | Status: SHIPPED | OUTPATIENT
Start: 2018-03-13 | End: 2018-10-10 | Stop reason: ALTCHOICE

## 2018-03-21 ENCOUNTER — OFFICE VISIT (OUTPATIENT)
Dept: ORTHOPEDICS | Facility: CLINIC | Age: 78
End: 2018-03-21
Payer: COMMERCIAL

## 2018-03-21 VITALS — WEIGHT: 205 LBS | HEIGHT: 65 IN | BODY MASS INDEX: 34.16 KG/M2

## 2018-03-21 DIAGNOSIS — M12.811 ROTATOR CUFF ARTHROPATHY, RIGHT: Primary | ICD-10-CM

## 2018-03-21 PROCEDURE — 20610 DRAIN/INJ JOINT/BURSA W/O US: CPT | Mod: RT | Performed by: ORTHOPAEDIC SURGERY

## 2018-03-21 ASSESSMENT — PAIN SCALES - GENERAL: PAINLEVEL: SEVERE PAIN (7)

## 2018-03-21 NOTE — MR AVS SNAPSHOT
"              After Visit Summary   3/21/2018    Mya Hui    MRN: [id number]           Patient Information     Date Of Birth          1940        Visit Information        Provider Department      3/21/2018 1:10 PM Annamaria Carpio MD [facility]        Today's Diagnoses     Rotator cuff arthropathy, right    -  1       Follow-ups after your visit        Who to contact     If you have questions or need follow up information about today's clinic visit or your schedule please contact [facility] directly at [phone].  Normal or non-critical lab and imaging results will be communicated to you by [place], letter or phone within 4 business days after the clinic has received the results. If you do not hear from us within 7 days, please contact the clinic through [facility] or phone. If you have a critical or abnormal lab result, we will notify you by phone as soon as possible.  Submit refill requests through [employer] or call your pharmacy and they will forward the refill request to us. Please allow 3 business days for your refill to be completed.          Additional Information About Your Visit        MyChart Information     [employer] lets you send messages to your doctor, view your test results, renew your prescriptions, schedule appointments and more. To sign up, go to [email or web address] . Click on \"Log in\" on the left side of the screen, which will take you to the Welcome page. Then click on \"Sign up Now\" on the right side of the page.     You will be asked to enter the access code listed below, as well as some personal information. Please follow the directions to create your username and password.     Your access code is: [email or web address]  Expires: 2018  3:29 PM     Your access code will  in 90 days. If you need help or a new code, please call your [facility] or [phone].        Care EveryWhere ID     This is your Care EveryWhere ID. This could be used " "by other organizations to access your [facility] medical records  [id number]        Your Vitals Were     Height BMI (Body Mass Index)                1.651 m (5' 5\") 34.11 kg/m2           Blood Pressure from Last 3 Encounters:   01/04/18 130/84   09/22/17 109/65   09/18/17 126/66    Weight from Last 3 Encounters:   03/21/18 93 kg (205 lb)   01/05/18 89.8 kg (198 lb)   01/04/18 89.8 kg (198 lb)              Today, you had the following     No orders found for display       Primary Care Provider Office Phone # Fax #    Chanell Garcia Omar, APRN [facility] [phone] [address] [place] [address]        Equal Access to Services     JANETTE RIBEIRO : Janelle hansen Somahi, waaxda luqadaha, qaybta kaalmada adeegyada, ct johnson . So [facility] [phone].    ATENCIÓN: Si habla español, tiene a pedro disposición servicios gratuitos de asistencia lingüística. Llame al [phone].    We comply with applicable federal civil rights laws and [place] laws. We do not discriminate on the basis of race, color, national origin, age, disability, sex, sexual orientation, or gender identity.            Thank you!     Thank you for choosing [facility]  for your care. Our goal is always to provide you with excellent care. Hearing back from our patients is one way we can continue to improve our services. Please take a few minutes to complete the written survey that you may receive in the mail after your visit with us. Thank you!             Your Updated Medication List - Protect others around you: Learn how to safely use, store and throw away your medicines at www.disposemymeds.org.          This list is accurate as of 3/21/18  3:29 PM.  Always use your most recent med list.                   Brand Name Dispense Instructions for use Diagnosis    acetaminophen 325 MG tablet    TYLENOL    100 tablet    Take 2 tablets (650 mg) by mouth every 4 hours as needed for mild pain    "     amitriptyline 25 MG tablet    ELAVIL    90 tablet    TAKE ONE TABLET BY MOUTH AT BEDTIME    Insomnia       blood glucose lancets standard    no brand specified    1 Box    Glucose test strips Use to test blood sugar 1 times daily or as directed.    Type 2 diabetes mellitus without complication, without long-term current use of insulin (H)       blood glucose monitoring test strip    no brand specified    100 strip    Use to test blood sugars 1 times daily or as directed    Type 2 diabetes mellitus without complication, without long-term current use of insulin (H)       CARTIA  MG 24 hr capsule   Generic drug:  diltiazem     90 capsule    TAKE ONE CAPSULE BY MOUTH ONCE DAILY    Essential hypertension with goal blood pressure less than 140/90       levothyroxine 50 MCG tablet    SYNTHROID/LEVOTHROID    90 tablet    Take 1 tablet (50 mcg) by mouth daily    Hypothyroidism, unspecified type       lisinopril-hydrochlorothiazide 20-12.5 MG per tablet    PRINZIDE/ZESTORETIC    180 tablet    Take 1 tablet by mouth 2 times daily Do not restart until 9/12/17    Type 2 diabetes mellitus without complication, without long-term current use of insulin (H)       metFORMIN 500 MG 24 hr tablet    GLUCOPHAGE-XR    270 tablet    ONE TABLET BY MOUTH EVERY MORNING AND TAKE TWO TABLETS BY MOUTH EVERY EVENING WITH MEALS    Type 2 diabetes mellitus without complication, without long-term current use of insulin (H)       MULTIVITAL PO      Take  by mouth. daily        omeprazole 20 MG CR capsule    priLOSEC    30 capsule    Take 1 capsule (20 mg) by mouth every evening    Gastroesophageal reflux disease, esophagitis presence not specified       * oxybutynin 5 MG 24 hr tablet    DITROPAN-XL     Take by mouth daily        * oxybutynin 5 MG tablet    DITROPAN    240 tablet    TAKE TWO TABLETS BY MOUTH TWICE A DAY AS NEEDED BLADDER SPASMS    OAB (overactive bladder)       pantoprazole 40 MG EC tablet    PROTONIX    180 tablet    Take 1  tablet (40 mg) by mouth 2 times daily Take 30-60 minutes before a meal.    Gastroesophageal reflux disease, esophagitis presence not specified       potassium chloride 10 MEQ tablet    K-TAB,KLOR-CON    90 tablet    TAKE ONE TABLET BY MOUTH THREE TIMES A DAY    Hypokalemia       pravastatin 40 MG tablet    PRAVACHOL    90 tablet    TAKE ONE TABLET BY MOUTH ONCE DAILY    Hyperlipidemia with target LDL less than 100       * Notice:  This list has 2 medication(s) that are the same as other medications prescribed for you. Read the directions carefully, and ask your doctor or other care provider to review them with you.

## 2018-03-21 NOTE — LETTER
"    3/21/2018         RE: Mya Hui  [address]  [place] [address]        Dear Colleague,    Thank you for referring your patient, Mya Hui, to the [facility]. Please see a copy of my visit note below.    Office Visit-Follow up    Chief Complaint: Mya Hui is a 78 year old female who is being seen for   Chief Complaint   Patient presents with     RECHECK     rt shoulder pain would like injection        History of Present Illness:   Pain 7/10, flared up arthritis after fall on ice 1/2018  Would like an injection today      REVIEW OF SYSTEMS  General: negative for, night sweats, dizziness, fatigue  Resp: No shortness of breath and no cough  CV: negative for chest pain, syncope or near-syncope  GI: negative for nausea, vomiting and diarrhea  : negative for dysuria and hematuria  Musculoskeletal: as above  Neurologic: negative for syncope   Hematologic: negative for bleeding disorder    Physical Exam:  Vitals: Ht 1.651 m (5' 5\")  Wt 93 kg (205 lb)  BMI 34.11 kg/m2  BMI= Body mass index is 34.11 kg/(m^2).  Constitutional: healthy, alert and no acute distress   Psychiatric: mentation appears normal and affect normal/bright  NEURO: no focal deficits  RESP: Normal with easy respirations and no use of accessory muscles to breathe, no audible wheezing or retractions  CV: No peripheral edema  SKIN: No erythema, rashes, excoriation, or breakdown. No evidence of infection.   JOINT/EXTREMITIES:right shoulder - more fluid motion and less pain than last visit, , ER 40, weak ER, good strength on abd/IR  GAIT: non-antalgic            Diagnostic Modalities:  None today.        Impression: right Rotator cuff arthropathy    Plan:  All of the above pertinent physical exam and imaging modalities findings was reviewed with Mya.                                          CONSERVATIVE CARE:  I recommend conservative care for the patient to include Tylenol, focused self directed " physical therapy, steroid injections, activity modifications.                                         INJECTION PROCEDURE:  The patient was counseled about an  injection, including discussion of risks (including infection), contents of the injection, rationale for performing the injection, and expected benefits of the injection. The skin was prepped with alcohol and betadine and then utilizing sterile technique an injection of the right shoulder subacromial space from the anterolateral approach  was performed. The injection consisted 1ml of Kenalog (40mg per 1ml) with 8ml 1% lidocaine plain. The patient tolerated the injection well, and there were no complications. The injection site was covered with a Band-Aid. The injection was performed by Annamaria Carpio M.D.                                                FUTURE PLAN:  On their return if they still have symptoms we will consider physical therapy, injection of steroids.        Return to clinic 6, week(s), PRN, or sooner as needed for changes.  Re-x-ray on return: No    Annamaria Carpio M.D.          Again, thank you for allowing me to participate in the care of your patient.        Sincerely,        Annamaria Carpio MD

## 2018-03-21 NOTE — NURSING NOTE
"Chief Complaint   Patient presents with     RECHECK     rt shoulder pain would like injection        Initial Ht 1.651 m (5' 5\")  Wt 93 kg (205 lb)  BMI 34.11 kg/m2 Estimated body mass index is 34.11 kg/(m^2) as calculated from the following:    Height as of this encounter: 1.651 m (5' 5\").    Weight as of this encounter: 93 kg (205 lb).  Medication Reconciliation: complete    BP completed using cuff size: NA (Not Taken)    Sheree George MA      "

## 2018-03-21 NOTE — PROGRESS NOTES
"Office Visit-Follow up    Chief Complaint: Mya Hui is a 78 year old female who is being seen for   Chief Complaint   Patient presents with     RECHECK     rt shoulder pain would like injection        History of Present Illness:   Pain 7/10, flared up arthritis after fall on ice 1/2018  Would like an injection today      REVIEW OF SYSTEMS  General: negative for, night sweats, dizziness, fatigue  Resp: No shortness of breath and no cough  CV: negative for chest pain, syncope or near-syncope  GI: negative for nausea, vomiting and diarrhea  : negative for dysuria and hematuria  Musculoskeletal: as above  Neurologic: negative for syncope   Hematologic: negative for bleeding disorder    Physical Exam:  Vitals: Ht 1.651 m (5' 5\")  Wt 93 kg (205 lb)  BMI 34.11 kg/m2  BMI= Body mass index is 34.11 kg/(m^2).  Constitutional: healthy, alert and no acute distress   Psychiatric: mentation appears normal and affect normal/bright  NEURO: no focal deficits  RESP: Normal with easy respirations and no use of accessory muscles to breathe, no audible wheezing or retractions  CV: No peripheral edema  SKIN: No erythema, rashes, excoriation, or breakdown. No evidence of infection.   JOINT/EXTREMITIES:right shoulder - more fluid motion and less pain than last visit, , ER 40, weak ER, good strength on abd/IR  GAIT: non-antalgic            Diagnostic Modalities:  None today.        Impression: right Rotator cuff arthropathy    Plan:  All of the above pertinent physical exam and imaging modalities findings was reviewed with May.                                          CONSERVATIVE CARE:  I recommend conservative care for the patient to include Tylenol, focused self directed physical therapy, steroid injections, activity modifications.                                         INJECTION PROCEDURE:  The patient was counseled about an  injection, including discussion of risks (including infection), contents of the injection, " rationale for performing the injection, and expected benefits of the injection. The skin was prepped with alcohol and betadine and then utilizing sterile technique an injection of the right shoulder subacromial space from the anterolateral approach  was performed. The injection consisted 1ml of Kenalog (40mg per 1ml) with 8ml 1% lidocaine plain. The patient tolerated the injection well, and there were no complications. The injection site was covered with a Band-Aid. The injection was performed by Annamaria Carpio M.D.                                                FUTURE PLAN:  On their return if they still have symptoms we will consider physical therapy, injection of steroids.        Return to clinic 6, week(s), PRN, or sooner as needed for changes.  Re-x-ray on return: No    Annamaria Carpio M.D.

## 2018-03-23 ENCOUNTER — APPOINTMENT (OUTPATIENT)
Dept: ULTRASOUND IMAGING | Facility: CLINIC | Age: 78
DRG: 419 | End: 2018-03-23
Attending: FAMILY MEDICINE
Payer: MEDICARE

## 2018-03-23 ENCOUNTER — HOSPITAL ENCOUNTER (INPATIENT)
Facility: CLINIC | Age: 78
LOS: 1 days | Discharge: HOME | DRG: 419 | End: 2018-03-25
Attending: FAMILY MEDICINE | Admitting: SURGERY
Payer: MEDICARE

## 2018-03-23 DIAGNOSIS — I10 ESSENTIAL HYPERTENSION WITH GOAL BLOOD PRESSURE LESS THAN 140/90: Primary | ICD-10-CM

## 2018-03-23 DIAGNOSIS — K81.0 ACUTE CHOLECYSTITIS: ICD-10-CM

## 2018-03-23 LAB
ALBUMIN SERPL-MCNC: 3.8 G/DL (ref 3.4–5)
ALP SERPL-CCNC: 66 U/L (ref 40–150)
ALT SERPL W P-5'-P-CCNC: 28 U/L (ref 0–50)
ANION GAP SERPL CALCULATED.3IONS-SCNC: 12 MMOL/L (ref 3–14)
AST SERPL W P-5'-P-CCNC: 19 U/L (ref 0–45)
BASOPHILS # BLD AUTO: 0 10E9/L (ref 0–0.2)
BASOPHILS NFR BLD AUTO: 0.2 %
BILIRUB SERPL-MCNC: 0.3 MG/DL (ref 0.2–1.3)
BUN SERPL-MCNC: 25 MG/DL (ref 7–30)
CALCIUM SERPL-MCNC: 8.9 MG/DL (ref 8.5–10.1)
CHLORIDE SERPL-SCNC: 107 MMOL/L (ref 94–109)
CO2 SERPL-SCNC: 23 MMOL/L (ref 20–32)
CREAT SERPL-MCNC: 0.82 MG/DL (ref 0.52–1.04)
DIFFERENTIAL METHOD BLD: NORMAL
EOSINOPHIL # BLD AUTO: 0 10E9/L (ref 0–0.7)
EOSINOPHIL NFR BLD AUTO: 0.1 %
ERYTHROCYTE [DISTWIDTH] IN BLOOD BY AUTOMATED COUNT: 13.4 % (ref 10–15)
GFR SERPL CREATININE-BSD FRML MDRD: 68 ML/MIN/1.7M2
GLUCOSE SERPL-MCNC: 155 MG/DL (ref 70–99)
HCT VFR BLD AUTO: 41 % (ref 35–47)
HGB BLD-MCNC: 13.3 G/DL (ref 11.7–15.7)
IMM GRANULOCYTES # BLD: 0 10E9/L (ref 0–0.4)
IMM GRANULOCYTES NFR BLD: 0.4 %
LIPASE SERPL-CCNC: 124 U/L (ref 73–393)
LYMPHOCYTES # BLD AUTO: 1.4 10E9/L (ref 0.8–5.3)
LYMPHOCYTES NFR BLD AUTO: 17.3 %
MCH RBC QN AUTO: 29.2 PG (ref 26.5–33)
MCHC RBC AUTO-ENTMCNC: 32.4 G/DL (ref 31.5–36.5)
MCV RBC AUTO: 90 FL (ref 78–100)
MONOCYTES # BLD AUTO: 0.6 10E9/L (ref 0–1.3)
MONOCYTES NFR BLD AUTO: 7.3 %
NEUTROPHILS # BLD AUTO: 6.1 10E9/L (ref 1.6–8.3)
NEUTROPHILS NFR BLD AUTO: 74.7 %
PLATELET # BLD AUTO: 225 10E9/L (ref 150–450)
POTASSIUM SERPL-SCNC: 3.8 MMOL/L (ref 3.4–5.3)
PROT SERPL-MCNC: 7.3 G/DL (ref 6.8–8.8)
RBC # BLD AUTO: 4.55 10E12/L (ref 3.8–5.2)
SODIUM SERPL-SCNC: 142 MMOL/L (ref 133–144)
TROPONIN I SERPL-MCNC: 0.03 UG/L (ref 0–0.04)
WBC # BLD AUTO: 8.1 10E9/L (ref 4–11)

## 2018-03-23 PROCEDURE — 99219 ZZC INITIAL OBSERVATION CARE,LEVL II: CPT | Performed by: INTERNAL MEDICINE

## 2018-03-23 PROCEDURE — G0378 HOSPITAL OBSERVATION PER HR: HCPCS

## 2018-03-23 PROCEDURE — 99285 EMERGENCY DEPT VISIT HI MDM: CPT | Mod: 25 | Performed by: FAMILY MEDICINE

## 2018-03-23 PROCEDURE — 96361 HYDRATE IV INFUSION ADD-ON: CPT | Performed by: FAMILY MEDICINE

## 2018-03-23 PROCEDURE — 93010 ELECTROCARDIOGRAM REPORT: CPT | Mod: Z6 | Performed by: FAMILY MEDICINE

## 2018-03-23 PROCEDURE — 83690 ASSAY OF LIPASE: CPT | Performed by: FAMILY MEDICINE

## 2018-03-23 PROCEDURE — 99284 EMERGENCY DEPT VISIT MOD MDM: CPT | Mod: 25 | Performed by: FAMILY MEDICINE

## 2018-03-23 PROCEDURE — 84443 ASSAY THYROID STIM HORMONE: CPT | Performed by: INTERNAL MEDICINE

## 2018-03-23 PROCEDURE — 25000128 H RX IP 250 OP 636: Performed by: FAMILY MEDICINE

## 2018-03-23 PROCEDURE — 96360 HYDRATION IV INFUSION INIT: CPT | Performed by: FAMILY MEDICINE

## 2018-03-23 PROCEDURE — 80053 COMPREHEN METABOLIC PANEL: CPT | Performed by: FAMILY MEDICINE

## 2018-03-23 PROCEDURE — 76705 ECHO EXAM OF ABDOMEN: CPT

## 2018-03-23 PROCEDURE — 85025 COMPLETE CBC W/AUTO DIFF WBC: CPT | Performed by: FAMILY MEDICINE

## 2018-03-23 PROCEDURE — 84484 ASSAY OF TROPONIN QUANT: CPT | Performed by: FAMILY MEDICINE

## 2018-03-23 PROCEDURE — 93005 ELECTROCARDIOGRAM TRACING: CPT | Performed by: FAMILY MEDICINE

## 2018-03-23 RX ORDER — SODIUM CHLORIDE 9 MG/ML
1000 INJECTION, SOLUTION INTRAVENOUS CONTINUOUS
Status: DISCONTINUED | OUTPATIENT
Start: 2018-03-23 | End: 2018-03-23

## 2018-03-23 RX ADMIN — SODIUM CHLORIDE 1000 ML: 9 INJECTION, SOLUTION INTRAVENOUS at 21:12

## 2018-03-23 NOTE — IP AVS SNAPSHOT
MRN:[id number]                      After Visit Summary   3/23/2018    Mya Hui    MRN: [id number]           Thank you!     Thank you for choosing [facility] for your care. Our goal is always to provide you with excellent care. Hearing back from our patients is one way we can continue to improve our services. Please take a few minutes to complete the written survey that you may receive in the mail after you visit with us. Thank you!        Patient Information     Date Of Birth          1940        About your hospital stay     You were admitted on:  March 23, 2018 You last received care in the:  [address] [facility]    You were discharged on:  March 25, 2018        Reason for your hospital stay       Acute cholecystitis s/p lap meghan                  Who to Call     For medical emergencies, please call 911.  For non-urgent questions about your medical care, please call your primary care provider or clinic, [phone]  For questions related to your surgery, please call your surgery clinic        Attending Provider     Provider Specialty    Josh James MD Emergency Medicine    Alejandro, Berry Tucker,  Surgery       Primary Care Provider Office Phone # Fax #    Chanell Garcia MAYI [facility] [phone]      After Care Instructions     Activity       Your activity upon discharge: activity as tolerated and no driving for today            Diet       Follow this diet upon discharge: Regular diet            Wound care and dressings       Instructions to care for your wound at home: ice to area for comfort and keep wound clean and dry. OK to shower on POD #1                  Follow-up Appointments     Follow-up and recommended labs and tests        Follow up with me,  Berry Allen, within 2 weeks. to evaluate after surgery.  No follow up labs or test are needed.                  Your next 10 appointments already scheduled     Apr 03, 2018   9:30 AM CDT   [facility] with MAYI Anthony CNP   [facility]    [address] [facility] [address] [phone]           Bring a current list of meds and any records pertaining to this visit. For Physicals, please bring immunization records and any forms needing to be filled out. Please arrive 10 minutes early to complete paperwork.              Further instructions from your care team       DISCHARGE INSTRUCTIONS FOR PATIENT   SCOPOLAMINE TRANSDERMAL PATCH  You may leave the patch on behind your ear for three days, but NO LONGER. You may have withdrawal symptoms (nausea, vomiting, headache, dizziness) if used longer.  When you remove the patch, you may wash and dry your hands thoroughly and before touching your eyes, as pupil may dilate.  Discard patch (away from children and pets).  You may develop some urinary hesitancy or urine retention.      Message left for Specialty Clinic Scheduling to call you to set up an appointment with Dr. Allen within 2 weeks. If you do not hear from them, call [phone]    Pending Results     Date and Time Order Name Status Description    3/24/2018 1818 Methicillin resistant staph aureus cult In process     3/24/2018 1159 Surgical pathology exam In process             Statement of Approval     Ordered          03/25/18 1218  I have reviewed and agree with all the recommendations and orders detailed in this document.  EFFECTIVE NOW     Approved and electronically signed by:  Berry Allen DO           03/24/18 1230  I have reviewed and agree with all the recommendations and orders detailed in this document.  EFFECTIVE NOW     Approved and electronically signed by:  Berry Allen DO             Admission Information     Date & Time Provider Department Dept. Phone    3/23/2018 Berry Allen DO [address] [facility] [phone]      Your Vitals Were     Blood  "Pressure Pulse Temperature Respirations Height Weight    151/79 (BP Location: Right arm) 60 98.2  F (36.8  C) (Oral) 20 1.702 m (5' 7\") 92 kg (202 lb 13.2 oz)    Pulse Oximetry BMI (Body Mass Index)                95% 31.77 kg/m2          [employer] Information     [facility] lets you send messages to your doctor, view your test results, renew your prescriptions, schedule appointments and more. To sign up, go to [email or web address] . Click on \"Log in\" on the left side of the screen, which will take you to the Welcome page. Then click on \"Sign up Now\" on the right side of the page.     You will be asked to enter the access code listed below, as well as some personal information. Please follow the directions to create your username and password.     Your access code is: [email or web address]  Expires: 2018  3:29 PM     Your access code will  in 90 days. If you need help or a new code, please call your [place] clinic or [phone].        Care EveryWhere ID     This is your Care EveryWhere ID. This could be used by other organizations to access your [place] medical records  [id number]        Equal Access to Services     JANETTE RIBEIRO AH: Hadii herbert de andao Sorenaali, waaxda luqadaha, qaybta kaalmada adeegyada, ct cuellar. So [facility] [phone].    ATENCIÓN: Si habla español, tiene a pedro disposición servicios gratuitos de asistencia lingüística. Llame al [phone].    We comply with applicable federal civil rights laws and [place] laws. We do not discriminate on the basis of race, color, national origin, age, disability, sex, sexual orientation, or gender identity.               Review of your medicines      START taking        Dose / Directions    lisinopril 10 MG tablet   Commonly known as:  PRINIVIL/ZESTRIL   Used for:  Essential hypertension with goal blood pressure less than 140/90        Dose:  10 mg   Take 1 tablet (10 mg) by mouth daily   Quantity:  30 tablet   Refills:  0 "       oxyCODONE-acetaminophen 5-325 MG per tablet   Commonly known as:  PERCOCET        Dose:  1 tablet   Take 1 tablet by mouth every 4 hours as needed for severe pain maximum 6 tablet(s) per day   Quantity:  20 tablet   Refills:  0         CONTINUE these medicines which have NOT CHANGED        Dose / Directions    acetaminophen 325 MG tablet   Commonly known as:  TYLENOL        Dose:  650 mg   Take 2 tablets (650 mg) by mouth every 4 hours as needed for mild pain   Quantity:  100 tablet   Refills:  0       amitriptyline 25 MG tablet   Commonly known as:  ELAVIL   Used for:  Insomnia        TAKE ONE TABLET BY MOUTH AT BEDTIME   Quantity:  90 tablet   Refills:  2       blood glucose lancets standard   Commonly known as:  no brand specified   Used for:  Type 2 diabetes mellitus without complication, without long-term current use of insulin (H)        Glucose test strips Use to test blood sugar 1 times daily or as directed.   Quantity:  1 Box   Refills:  11       blood glucose monitoring test strip   Commonly known as:  no brand specified   Used for:  Type 2 diabetes mellitus without complication, without long-term current use of insulin (H)        Use to test blood sugars 1 times daily or as directed   Quantity:  100 strip   Refills:  3       CARTIA  MG 24 hr capsule   Used for:  Essential hypertension with goal blood pressure less than 140/90   Generic drug:  diltiazem        TAKE ONE CAPSULE BY MOUTH ONCE DAILY   Quantity:  90 capsule   Refills:  2       levothyroxine 50 MCG tablet   Commonly known as:  SYNTHROID/LEVOTHROID   Used for:  Hypothyroidism, unspecified type        Dose:  50 mcg   Take 1 tablet (50 mcg) by mouth daily   Quantity:  90 tablet   Refills:  2       lisinopril-hydrochlorothiazide 20-12.5 MG per tablet   Commonly known as:  PRINZIDE/ZESTORETIC   Used for:  Type 2 diabetes mellitus without complication, without long-term current use of insulin (H)   Notes to Patient:  Do not take until  after your follow up appointment with your primary care doctor.         Dose:  1 tablet   Take 1 tablet by mouth 2 times daily Do not restart until 9/12/17   Quantity:  180 tablet   Refills:  1       metFORMIN 500 MG 24 hr tablet   Commonly known as:  GLUCOPHAGE-XR   Used for:  Type 2 diabetes mellitus without complication, without long-term current use of insulin (H)        ONE TABLET BY MOUTH EVERY MORNING AND TAKE TWO TABLETS BY MOUTH EVERY EVENING WITH MEALS   Quantity:  270 tablet   Refills:  2       MULTIVITAL PO        Take  by mouth. daily   Refills:  0       * oxybutynin 5 MG 24 hr tablet   Commonly known as:  DITROPAN-XL        Take by mouth daily   Refills:  0       * oxybutynin 5 MG tablet   Commonly known as:  DITROPAN   Used for:  OAB (overactive bladder)        TAKE TWO TABLETS BY MOUTH TWICE A DAY AS NEEDED BLADDER SPASMS   Quantity:  240 tablet   Refills:  5       pantoprazole 40 MG EC tablet   Commonly known as:  PROTONIX   Used for:  Gastroesophageal reflux disease, esophagitis presence not specified        Dose:  40 mg   Take 1 tablet (40 mg) by mouth 2 times daily Take 30-60 minutes before a meal.   Quantity:  180 tablet   Refills:  1       potassium chloride 10 MEQ tablet   Commonly known as:  K-TAB,KLOR-CON   Used for:  Hypokalemia        TAKE ONE TABLET BY MOUTH THREE TIMES A DAY   Quantity:  90 tablet   Refills:  11       pravastatin 40 MG tablet   Commonly known as:  PRAVACHOL   Used for:  Hyperlipidemia with target LDL less than 100        TAKE ONE TABLET BY MOUTH ONCE DAILY   Quantity:  90 tablet   Refills:  3       * Notice:  This list has 2 medication(s) that are the same as other medications prescribed for you. Read the directions carefully, and ask your doctor or other care provider to review them with you.         Where to get your medicines      Some of these will need a paper prescription and others can be bought over the counter. Ask your nurse if you have questions.     Bring a  paper prescription for each of these medications     lisinopril 10 MG tablet    oxyCODONE-acetaminophen 5-325 MG per tablet                Protect others around you: Learn how to safely use, store and throw away your medicines at www.disposemymeds.org.        Information about OPIOIDS     PRESCRIPTION OPIOIDS: WHAT YOU NEED TO KNOW    Prescription opioids can be used to help relieve moderate to severe pain and are often prescribed following a surgery or injury, or for certain health conditions. These medications can be an important part of treatment but also come with serious risks. It is important to work with your health care provider to make sure you are getting the safest, most effective care.    WHAT ARE THE RISKS AND SIDE EFFECTS OF OPIOID USE?  Prescription opioids carry serious risks of addiction and overdose, especially with prolonged use. An opioid overdose, often marked by slowed breathing can cause sudden death. The use of prescription opioids can have a number of side effects as well, even when taken as directed:      Tolerance - meaning you might need to take more of a medication for the same pain relief    Physical dependence - meaning you have symptoms of withdrawal when a medication is stopped    Increased sensitivity to pain    Constipation    Nausea, vomiting, and dry mouth    Sleepiness and dizziness    Confusion    Depression    Low levels of testosterone that can result in lower sex drive, energy, and strength    Itching and sweating    RISKS ARE GREATER WITH:    History of drug misuse, substance use disorder, or overdose    Mental health conditions (such as depression or anxiety)    Sleep apnea    Older age (65 years or older)    Pregnancy    Avoid alcohol while taking prescription opioids.   Also, unless specifically advised by your health care provider, medications to avoid include:    Benzodiazepines (such as Xanax or Valium)    Muscle relaxants (such as Soma or Flexeril)    Hypnotics (such  as Ambien or Lunesta)    Other prescription opioids    KNOW YOUR OPTIONS:  Talk to your health care provider about ways to manage your pain that do not involve prescription opioids. Some of these options may actually work better and have fewer risks and side effects:    Pain relievers such as acetaminophen, ibuprofen, and naproxen    Some medications that are also used for depression or seizures    Physical therapy and exercise    Cognitive behavioral therapy, a psychological, goal-directed approach, in which patients learn how to modify physical, behavioral, and emotional triggers of pain and stress    IF YOU ARE PRESCRIBED OPIOIDS FOR PAIN:    Never take opioids in greater amounts or more often than prescribed    Follow up with your primary health care provider and work together to create a plan on how to manage your pain.    Talk about ways to help manage your pain that do not involve prescription opioids    Talk about all concerns and side effects    Help prevent misuse and abuse    Never sell or share prescription opioids    Never use another person's prescription opioids    Store prescription opioids in a secure place and out of reach of others (this may include visitors, children, friends, and family)    Visit www.cdc.gov/drugoverdose to learn about risks of opioid abuse and overdose    If you believe you may be struggling with addiction, tell your health care provider and ask for guidance or call [facility] National Helpline at 4-860-605-HELP    LEARN MORE / www.cdc.gov/drugoverdose/prescribing/guideline.html    Safely dispose of unused prescription opioids: Find your local drug take-back programs and more information about the importance of safe disposal at www.doseofreality.mn.gov             Medication List: This is a list of all your medications and when to take them. Check marks below indicate your daily home schedule. Keep this list as a reference.      Medications           Morning Afternoon Evening  Bedtime As Needed    acetaminophen 325 MG tablet   Commonly known as:  TYLENOL   Take 2 tablets (650 mg) by mouth every 4 hours as needed for mild pain   Last time this was given:  650 mg on 3/25/2018 12:53 PM                                   amitriptyline 25 MG tablet   Commonly known as:  ELAVIL   TAKE ONE TABLET BY MOUTH AT BEDTIME   Last time this was given:  25 mg on 3/24/2018  9:19 PM   Next Dose Due:  Familia evening, 3/25/18                                   blood glucose lancets standard   Commonly known as:  no brand specified   Glucose test strips Use to test blood sugar 1 times daily or as directed.                                blood glucose monitoring test strip   Commonly known as:  no brand specified   Use to test blood sugars 1 times daily or as directed                                CARTIA  MG 24 hr capsule   TAKE ONE CAPSULE BY MOUTH ONCE DAILY   Generic drug:  diltiazem   Next Dose Due:  Monday morning, 3/26/18                                   levothyroxine 50 MCG tablet   Commonly known as:  SYNTHROID/LEVOTHROID   Take 1 tablet (50 mcg) by mouth daily   Next Dose Due:  Monday morning, 3/26/18                                   lisinopril 10 MG tablet   Commonly known as:  PRINIVIL/ZESTRIL   Take 1 tablet (10 mg) by mouth daily   Last time this was given:  10 mg on 3/25/2018  8:12 AM   Next Dose Due:  Monday morning, 3/26/18                                   lisinopril-hydrochlorothiazide 20-12.5 MG per tablet   Commonly known as:  PRINZIDE/ZESTORETIC   Take 1 tablet by mouth 2 times daily Do not restart until 9/12/17   Notes to Patient:  Do not take until after your follow up appointment with your primary care doctor.                                 metFORMIN 500 MG 24 hr tablet   Commonly known as:  GLUCOPHAGE-XR   ONE TABLET BY MOUTH EVERY MORNING AND TAKE TWO TABLETS BY MOUTH EVERY EVENING WITH MEALS   Next Dose Due:  Familia evening, 3/25/18                                       MULTIVITAL PO   Take  by mouth. daily   Next Dose Due:  None given in hospital, return to home routine.                                    * oxybutynin 5 MG 24 hr tablet   Commonly known as:  DITROPAN-XL   Take by mouth daily   Last time this was given:  5 mg on 3/24/2018  9:19 PM   Next Dose Due:  Familia evening, 3/25/18                                   * oxybutynin 5 MG tablet   Commonly known as:  DITROPAN   TAKE TWO TABLETS BY MOUTH TWICE A DAY AS NEEDED BLADDER SPASMS                                   oxyCODONE-acetaminophen 5-325 MG per tablet   Commonly known as:  PERCOCET   Take 1 tablet by mouth every 4 hours as needed for severe pain maximum 6 tablet(s) per day                                   pantoprazole 40 MG EC tablet   Commonly known as:  PROTONIX   Take 1 tablet (40 mg) by mouth 2 times daily Take 30-60 minutes before a meal.   Next Dose Due:  Familia evening, 3/25/18                                      potassium chloride 10 MEQ tablet   Commonly known as:  K-TAB,KLOR-CON   TAKE ONE TABLET BY MOUTH THREE TIMES A DAY   Next Dose Due:  None given in hospital, return to home routine.                                          pravastatin 40 MG tablet   Commonly known as:  PRAVACHOL   TAKE ONE TABLET BY MOUTH ONCE DAILY   Next Dose Due:  Monday morning, 3/26/18                                   * Notice:  This list has 2 medication(s) that are the same as other medications prescribed for you. Read the directions carefully, and ask your doctor or other care provider to review them with you.              More Information        Patient Education    Oxycodone Hydrochloride, Acetaminophen Oral capsule    Oxycodone Hydrochloride, Acetaminophen Oral solution    Oxycodone Hydrochloride, Acetaminophen Oral tablet    Oxycodone Hydrochloride, Acetaminophen Oral tablet, biphasic release  Oxycodone Hydrochloride, Acetaminophen Oral tablet  What is this medicine?  ACETAMINOPHEN; OXYCODONE (a set a BELKIS hugh fen; ox i  CUATE done) is a pain reliever. It is used to treat moderate to severe pain.  This medicine may be used for other purposes; ask your health care provider or pharmacist if you have questions.  What should I tell my health care provider before I take this medicine?  They need to know if you have any of these conditions:    brain tumor    Crohn's disease, inflammatory bowel disease, or ulcerative colitis    drug abuse or addiction    head injury    heart or circulation problems    if you often drink alcohol    kidney disease or problems going to the bathroom    liver disease    lung disease, asthma, or breathing problems    an unusual or allergic reaction to acetaminophen, oxycodone, other opioid analgesics, other medicines, foods, dyes, or preservatives    pregnant or trying to get pregnant    breast-feeding  How should I use this medicine?  Take this medicine by mouth with a full glass of water. Follow the directions on the prescription label. You can take it with or without food. If it upsets your stomach, take it with food. Take your medicine at regular intervals. Do not take it more often than directed.  Talk to your pediatrician regarding the use of this medicine in children. Special care may be needed.  Patients over 65 years old may have a stronger reaction and need a smaller dose.  Overdosage: If you think you have taken too much of this medicine contact a poison control center or emergency room at once.  NOTE: This medicine is only for you. Do not share this medicine with others.  What if I miss a dose?  If you miss a dose, take it as soon as you can. If it is almost time for your next dose, take only that dose. Do not take double or extra doses.  What may interact with this medicine?    alcohol    antihistamines    barbiturates like amobarbital, butalbital, butabarbital, methohexital, pentobarbital, phenobarbital, thiopental, and secobarbital    benztropine    drugs for bladder problems like solifenacin,  trospium, oxybutynin, tolterodine, hyoscyamine, and methscopolamine    drugs for breathing problems like ipratropium and tiotropium    drugs for certain stomach or intestine problems like propantheline, homatropine methylbromide, glycopyrrolate, atropine, belladonna, and dicyclomine    general anesthetics like etomidate, ketamine, nitrous oxide, propofol, desflurane, enflurane, halothane, isoflurane, and sevoflurane    medicines for depression, anxiety, or psychotic disturbances    medicines for sleep    muscle relaxants    naltrexone    narcotic medicines (opiates) for pain    phenothiazines like perphenazine, thioridazine, chlorpromazine, mesoridazine, fluphenazine, prochlorperazine, promazine, and trifluoperazine    scopolamine    tramadol    trihexyphenidyl  This list may not describe all possible interactions. Give your health care provider a list of all the medicines, herbs, non-prescription drugs, or dietary supplements you use. Also tell them if you smoke, drink alcohol, or use illegal drugs. Some items may interact with your medicine.  What should I watch for while using this medicine?  Tell your doctor or health care professional if your pain does not go away, if it gets worse, or if you have new or a different type of pain. You may develop tolerance to the medicine. Tolerance means that you will need a higher dose of the medication for pain relief. Tolerance is normal and is expected if you take this medicine for a long time.  Do not suddenly stop taking your medicine because you may develop a severe reaction. Your body becomes used to the medicine. This does NOT mean you are addicted. Addiction is a behavior related to getting and using a drug for a non-medical reason. If you have pain, you have a medical reason to take pain medicine. Your doctor will tell you how much medicine to take. If your doctor wants you to stop the medicine, the dose will be slowly lowered over time to avoid any side  effects.  You may get drowsy or dizzy. Do not drive, use machinery, or do anything that needs mental alertness until you know how this medicine affects you. Do not stand or sit up quickly, especially if you are an older patient. This reduces the risk of dizzy or fainting spells. Alcohol may interfere with the effect of this medicine. Avoid alcoholic drinks.  There are different types of narcotic medicines (opiates) for pain. If you take more than one type at the same time, you may have more side effects. Give your health care provider a list of all medicines you use. Your doctor will tell you how much medicine to take. Do not take more medicine than directed. Call emergency for help if you have problems breathing.  The medicine will cause constipation. Try to have a bowel movement at least every 2 to 3 days. If you do not have a bowel movement for 3 days, call your doctor or health care professional.  Do not take Tylenol (acetaminophen) or medicines that have acetaminophen with this medicine. Too much acetaminophen can be very dangerous. Many nonprescription medicines contain acetaminophen. Always read the labels carefully to avoid taking more acetaminophen.  What side effects may I notice from receiving this medicine?  Side effects that you should report to your doctor or health care professional as soon as possible:    allergic reactions like skin rash, itching or hives, swelling of the face, lips, or tongue    breathing difficulties, wheezing    confusion    light headedness or fainting spells    severe stomach pain    unusually weak or tired    yellowing of the skin or the whites of the eyes  Side effects that usually do not require medical attention (report to your doctor or health care professional if they continue or are bothersome):    dizziness    drowsiness    nausea    vomiting  This list may not describe all possible side effects. Call your doctor for medical advice about side effects. You may report  side effects to FDA at 8-489-FDA-2530.  Where should I keep my medicine?  Keep out of the reach of children. This medicine can be abused. Keep your medicine in a safe place to protect it from theft. Do not share this medicine with anyone. Selling or giving away this medicine is dangerous and against the law.  Store at room temperature between 20 and 25 degrees C (68 and 77 degrees F). Keep container tightly closed. Protect from light.  This medicine may cause accidental overdose and death if it is taken by other adults, children, or pets. Flush any unused medicine down the toilet to reduce the chance of harm. Do not use the medicine after the expiration date.  NOTE: This sheet is a summary. It may not cover all possible information. If you have questions about this medicine, talk to your doctor, pharmacist, or health care provider.  NOTE:This sheet is a summary. It may not cover all possible information. If you have questions about this medicine, talk to your doctor, pharmacist, or health care provider. Copyright  2016 Gold Standard

## 2018-03-23 NOTE — IP AVS SNAPSHOT
[address] [facility]    [address] [facility]     [place] [address]    Phone:  [phone]                                       After Visit Summary   3/23/2018    Mya Hui    MRN: [id number]           After Visit Summary Signature Page     I have received my discharge instructions, and my questions have been answered. I have discussed any challenges I see with this plan with the nurse or doctor.    ..........................................................................................................................................  Patient/Patient Representative Signature      ..........................................................................................................................................  Patient Representative Print Name and Relationship to Patient    ..................................................               ................................................  Date                                            Time    ..........................................................................................................................................  Reviewed by Signature/Title    ...................................................              ..............................................  Date                                                            Time

## 2018-03-24 ENCOUNTER — ANESTHESIA (OUTPATIENT)
Dept: SURGERY | Facility: CLINIC | Age: 78
DRG: 419 | End: 2018-03-24
Payer: MEDICARE

## 2018-03-24 ENCOUNTER — ANESTHESIA EVENT (OUTPATIENT)
Dept: SURGERY | Facility: CLINIC | Age: 78
DRG: 419 | End: 2018-03-24
Payer: MEDICARE

## 2018-03-24 LAB
GLUCOSE BLDC GLUCOMTR-MCNC: 103 MG/DL (ref 70–99)
GLUCOSE BLDC GLUCOMTR-MCNC: 121 MG/DL (ref 70–99)
GLUCOSE BLDC GLUCOMTR-MCNC: 124 MG/DL (ref 70–99)
GLUCOSE BLDC GLUCOMTR-MCNC: 152 MG/DL (ref 70–99)
GLUCOSE BLDC GLUCOMTR-MCNC: 175 MG/DL (ref 70–99)
TSH SERPL DL<=0.005 MIU/L-ACNC: 2.23 MU/L (ref 0.4–4)

## 2018-03-24 PROCEDURE — 37000009 ZZH ANESTHESIA TECHNICAL FEE, EACH ADDTL 15 MIN: Performed by: SURGERY

## 2018-03-24 PROCEDURE — 25000125 ZZHC RX 250: Performed by: NURSE ANESTHETIST, CERTIFIED REGISTERED

## 2018-03-24 PROCEDURE — 27210794 ZZH OR GENERAL SUPPLY STERILE: Performed by: SURGERY

## 2018-03-24 PROCEDURE — 27211024 ZZHC OR SUPPLY OTHER OPNP: Performed by: SURGERY

## 2018-03-24 PROCEDURE — 96376 TX/PRO/DX INJ SAME DRUG ADON: CPT

## 2018-03-24 PROCEDURE — 25000128 H RX IP 250 OP 636: Performed by: NURSE ANESTHETIST, CERTIFIED REGISTERED

## 2018-03-24 PROCEDURE — 71000015 ZZH RECOVERY PHASE 1 LEVEL 2 EA ADDTL HR: Performed by: SURGERY

## 2018-03-24 PROCEDURE — A9270 NON-COVERED ITEM OR SERVICE: HCPCS | Mod: GY | Performed by: INTERNAL MEDICINE

## 2018-03-24 PROCEDURE — G0378 HOSPITAL OBSERVATION PER HR: HCPCS

## 2018-03-24 PROCEDURE — 99222 1ST HOSP IP/OBS MODERATE 55: CPT | Mod: 57 | Performed by: SURGERY

## 2018-03-24 PROCEDURE — 12000000 ZZH R&B MED SURG/OB

## 2018-03-24 PROCEDURE — 96375 TX/PRO/DX INJ NEW DRUG ADDON: CPT

## 2018-03-24 PROCEDURE — 25000125 ZZHC RX 250: Performed by: SURGERY

## 2018-03-24 PROCEDURE — 25000125 ZZHC RX 250: Performed by: INTERNAL MEDICINE

## 2018-03-24 PROCEDURE — 36000058 ZZH SURGERY LEVEL 3 EA 15 ADDTL MIN: Performed by: SURGERY

## 2018-03-24 PROCEDURE — 36000056 ZZH SURGERY LEVEL 3 1ST 30 MIN: Performed by: SURGERY

## 2018-03-24 PROCEDURE — 88304 TISSUE EXAM BY PATHOLOGIST: CPT | Mod: 26 | Performed by: SURGERY

## 2018-03-24 PROCEDURE — 25000132 ZZH RX MED GY IP 250 OP 250 PS 637: Mod: GY | Performed by: INTERNAL MEDICINE

## 2018-03-24 PROCEDURE — 25000128 H RX IP 250 OP 636: Performed by: INTERNAL MEDICINE

## 2018-03-24 PROCEDURE — C9399 UNCLASSIFIED DRUGS OR BIOLOG: HCPCS | Performed by: NURSE ANESTHETIST, CERTIFIED REGISTERED

## 2018-03-24 PROCEDURE — 88304 TISSUE EXAM BY PATHOLOGIST: CPT | Performed by: SURGERY

## 2018-03-24 PROCEDURE — 37000008 ZZH ANESTHESIA TECHNICAL FEE, 1ST 30 MIN: Performed by: SURGERY

## 2018-03-24 PROCEDURE — 96374 THER/PROPH/DIAG INJ IV PUSH: CPT

## 2018-03-24 PROCEDURE — 71000014 ZZH RECOVERY PHASE 1 LEVEL 2 FIRST HR: Performed by: SURGERY

## 2018-03-24 PROCEDURE — 00000146 ZZHCL STATISTIC GLUCOSE BY METER IP

## 2018-03-24 PROCEDURE — 47562 LAPAROSCOPIC CHOLECYSTECTOMY: CPT | Performed by: SURGERY

## 2018-03-24 PROCEDURE — 0FT44ZZ RESECTION OF GALLBLADDER, PERCUTANEOUS ENDOSCOPIC APPROACH: ICD-10-PCS | Performed by: SURGERY

## 2018-03-24 PROCEDURE — 87081 CULTURE SCREEN ONLY: CPT | Performed by: SURGERY

## 2018-03-24 RX ORDER — ACETAMINOPHEN 325 MG/1
650 TABLET ORAL EVERY 4 HOURS PRN
Status: DISCONTINUED | OUTPATIENT
Start: 2018-03-24 | End: 2018-03-25 | Stop reason: HOSPADM

## 2018-03-24 RX ORDER — FENTANYL CITRATE 50 UG/ML
25-50 INJECTION, SOLUTION INTRAMUSCULAR; INTRAVENOUS
Status: DISCONTINUED | OUTPATIENT
Start: 2018-03-24 | End: 2018-03-24 | Stop reason: HOSPADM

## 2018-03-24 RX ORDER — PROPOFOL 10 MG/ML
INJECTION, EMULSION INTRAVENOUS PRN
Status: DISCONTINUED | OUTPATIENT
Start: 2018-03-24 | End: 2018-03-24

## 2018-03-24 RX ORDER — ONDANSETRON 2 MG/ML
4 INJECTION INTRAMUSCULAR; INTRAVENOUS EVERY 30 MIN PRN
Status: DISCONTINUED | OUTPATIENT
Start: 2018-03-24 | End: 2018-03-24 | Stop reason: HOSPADM

## 2018-03-24 RX ORDER — ONDANSETRON 2 MG/ML
INJECTION INTRAMUSCULAR; INTRAVENOUS
Status: DISCONTINUED
Start: 2018-03-24 | End: 2018-03-24 | Stop reason: HOSPADM

## 2018-03-24 RX ORDER — NALOXONE HYDROCHLORIDE 0.4 MG/ML
.1-.4 INJECTION, SOLUTION INTRAMUSCULAR; INTRAVENOUS; SUBCUTANEOUS
Status: ACTIVE | OUTPATIENT
Start: 2018-03-24 | End: 2018-03-25

## 2018-03-24 RX ORDER — HYDRALAZINE HYDROCHLORIDE 20 MG/ML
10 INJECTION INTRAMUSCULAR; INTRAVENOUS EVERY 5 MIN PRN
Status: DISCONTINUED | OUTPATIENT
Start: 2018-03-24 | End: 2018-03-24 | Stop reason: HOSPADM

## 2018-03-24 RX ORDER — PROCHLORPERAZINE MALEATE 5 MG
5 TABLET ORAL EVERY 6 HOURS PRN
Status: DISCONTINUED | OUTPATIENT
Start: 2018-03-24 | End: 2018-03-25 | Stop reason: HOSPADM

## 2018-03-24 RX ORDER — ONDANSETRON 2 MG/ML
INJECTION INTRAMUSCULAR; INTRAVENOUS PRN
Status: DISCONTINUED | OUTPATIENT
Start: 2018-03-24 | End: 2018-03-24

## 2018-03-24 RX ORDER — FENTANYL CITRATE 50 UG/ML
INJECTION, SOLUTION INTRAMUSCULAR; INTRAVENOUS PRN
Status: DISCONTINUED | OUTPATIENT
Start: 2018-03-24 | End: 2018-03-24

## 2018-03-24 RX ORDER — ONDANSETRON 4 MG/1
4 TABLET, ORALLY DISINTEGRATING ORAL EVERY 30 MIN PRN
Status: DISCONTINUED | OUTPATIENT
Start: 2018-03-24 | End: 2018-03-24 | Stop reason: HOSPADM

## 2018-03-24 RX ORDER — PROCHLORPERAZINE 25 MG
12.5 SUPPOSITORY, RECTAL RECTAL EVERY 12 HOURS PRN
Status: DISCONTINUED | OUTPATIENT
Start: 2018-03-24 | End: 2018-03-25 | Stop reason: HOSPADM

## 2018-03-24 RX ORDER — METOPROLOL TARTRATE 1 MG/ML
5 INJECTION, SOLUTION INTRAVENOUS EVERY 5 MIN PRN
Status: DISCONTINUED | OUTPATIENT
Start: 2018-03-24 | End: 2018-03-24 | Stop reason: HOSPADM

## 2018-03-24 RX ORDER — OXYCODONE AND ACETAMINOPHEN 5; 325 MG/1; MG/1
1 TABLET ORAL EVERY 4 HOURS PRN
Status: DISCONTINUED | OUTPATIENT
Start: 2018-03-24 | End: 2018-03-25 | Stop reason: HOSPADM

## 2018-03-24 RX ORDER — PROPOFOL 10 MG/ML
INJECTION, EMULSION INTRAVENOUS CONTINUOUS PRN
Status: DISCONTINUED | OUTPATIENT
Start: 2018-03-24 | End: 2018-03-24

## 2018-03-24 RX ORDER — ONDANSETRON 4 MG/1
4 TABLET, ORALLY DISINTEGRATING ORAL EVERY 6 HOURS PRN
Status: DISCONTINUED | OUTPATIENT
Start: 2018-03-24 | End: 2018-03-25 | Stop reason: HOSPADM

## 2018-03-24 RX ORDER — SODIUM CHLORIDE, SODIUM LACTATE, POTASSIUM CHLORIDE, CALCIUM CHLORIDE 600; 310; 30; 20 MG/100ML; MG/100ML; MG/100ML; MG/100ML
INJECTION, SOLUTION INTRAVENOUS CONTINUOUS PRN
Status: DISCONTINUED | OUTPATIENT
Start: 2018-03-24 | End: 2018-03-24

## 2018-03-24 RX ORDER — NALOXONE HYDROCHLORIDE 0.4 MG/ML
.1-.4 INJECTION, SOLUTION INTRAMUSCULAR; INTRAVENOUS; SUBCUTANEOUS
Status: DISCONTINUED | OUTPATIENT
Start: 2018-03-24 | End: 2018-03-25 | Stop reason: HOSPADM

## 2018-03-24 RX ORDER — HYDROMORPHONE HYDROCHLORIDE 1 MG/ML
.3-.5 INJECTION, SOLUTION INTRAMUSCULAR; INTRAVENOUS; SUBCUTANEOUS EVERY 5 MIN PRN
Status: DISCONTINUED | OUTPATIENT
Start: 2018-03-24 | End: 2018-03-24 | Stop reason: HOSPADM

## 2018-03-24 RX ORDER — ALBUTEROL SULFATE 0.83 MG/ML
2.5 SOLUTION RESPIRATORY (INHALATION) EVERY 4 HOURS PRN
Status: DISCONTINUED | OUTPATIENT
Start: 2018-03-24 | End: 2018-03-24 | Stop reason: HOSPADM

## 2018-03-24 RX ORDER — SODIUM CHLORIDE 9 MG/ML
INJECTION, SOLUTION INTRAVENOUS CONTINUOUS
Status: DISCONTINUED | OUTPATIENT
Start: 2018-03-24 | End: 2018-03-24

## 2018-03-24 RX ORDER — OXYBUTYNIN CHLORIDE 5 MG/1
5 TABLET, EXTENDED RELEASE ORAL AT BEDTIME
Status: DISCONTINUED | OUTPATIENT
Start: 2018-03-24 | End: 2018-03-25 | Stop reason: HOSPADM

## 2018-03-24 RX ORDER — OXYCODONE AND ACETAMINOPHEN 5; 325 MG/1; MG/1
1 TABLET ORAL EVERY 4 HOURS PRN
Qty: 20 TABLET | Refills: 0 | Status: SHIPPED | OUTPATIENT
Start: 2018-03-24 | End: 2018-04-03

## 2018-03-24 RX ORDER — HYDROXYZINE HYDROCHLORIDE 25 MG/1
25 TABLET, FILM COATED ORAL
Status: DISCONTINUED | OUTPATIENT
Start: 2018-03-24 | End: 2018-03-24 | Stop reason: HOSPADM

## 2018-03-24 RX ORDER — DEXAMETHASONE SODIUM PHOSPHATE 10 MG/ML
INJECTION, SOLUTION INTRAMUSCULAR; INTRAVENOUS PRN
Status: DISCONTINUED | OUTPATIENT
Start: 2018-03-24 | End: 2018-03-24

## 2018-03-24 RX ORDER — DEXTROSE MONOHYDRATE 25 G/50ML
25-50 INJECTION, SOLUTION INTRAVENOUS
Status: DISCONTINUED | OUTPATIENT
Start: 2018-03-24 | End: 2018-03-25 | Stop reason: HOSPADM

## 2018-03-24 RX ORDER — BUPIVACAINE HYDROCHLORIDE AND EPINEPHRINE 2.5; 5 MG/ML; UG/ML
INJECTION, SOLUTION INFILTRATION; PERINEURAL PRN
Status: DISCONTINUED | OUTPATIENT
Start: 2018-03-24 | End: 2018-03-24 | Stop reason: HOSPADM

## 2018-03-24 RX ORDER — SODIUM CHLORIDE, SODIUM LACTATE, POTASSIUM CHLORIDE, CALCIUM CHLORIDE 600; 310; 30; 20 MG/100ML; MG/100ML; MG/100ML; MG/100ML
INJECTION, SOLUTION INTRAVENOUS CONTINUOUS
Status: DISCONTINUED | OUTPATIENT
Start: 2018-03-24 | End: 2018-03-24 | Stop reason: HOSPADM

## 2018-03-24 RX ORDER — ONDANSETRON 2 MG/ML
4 INJECTION INTRAMUSCULAR; INTRAVENOUS EVERY 6 HOURS PRN
Status: DISCONTINUED | OUTPATIENT
Start: 2018-03-24 | End: 2018-03-25 | Stop reason: HOSPADM

## 2018-03-24 RX ORDER — LIDOCAINE HYDROCHLORIDE 20 MG/ML
INJECTION, SOLUTION INFILTRATION; PERINEURAL PRN
Status: DISCONTINUED | OUTPATIENT
Start: 2018-03-24 | End: 2018-03-24

## 2018-03-24 RX ORDER — METOPROLOL TARTRATE 1 MG/ML
INJECTION, SOLUTION INTRAVENOUS PRN
Status: DISCONTINUED | OUTPATIENT
Start: 2018-03-24 | End: 2018-03-24

## 2018-03-24 RX ORDER — HYDROMORPHONE HYDROCHLORIDE 1 MG/ML
0.2 INJECTION, SOLUTION INTRAMUSCULAR; INTRAVENOUS; SUBCUTANEOUS
Status: DISCONTINUED | OUTPATIENT
Start: 2018-03-24 | End: 2018-03-24

## 2018-03-24 RX ORDER — SCOLOPAMINE TRANSDERMAL SYSTEM 1 MG/1
PATCH, EXTENDED RELEASE TRANSDERMAL PRN
Status: DISCONTINUED | OUTPATIENT
Start: 2018-03-24 | End: 2018-03-24

## 2018-03-24 RX ORDER — NICOTINE POLACRILEX 4 MG
15-30 LOZENGE BUCCAL
Status: DISCONTINUED | OUTPATIENT
Start: 2018-03-24 | End: 2018-03-25 | Stop reason: HOSPADM

## 2018-03-24 RX ORDER — DILTIAZEM HYDROCHLORIDE 120 MG/1
120 CAPSULE, EXTENDED RELEASE ORAL DAILY
Status: DISCONTINUED | OUTPATIENT
Start: 2018-03-24 | End: 2018-03-25 | Stop reason: HOSPADM

## 2018-03-24 RX ADMIN — PROPOFOL 150 MG: 10 INJECTION, EMULSION INTRAVENOUS at 11:21

## 2018-03-24 RX ADMIN — OXYBUTYNIN CHLORIDE 5 MG: 5 TABLET, EXTENDED RELEASE ORAL at 21:19

## 2018-03-24 RX ADMIN — FENTANYL CITRATE 50 MCG: 50 INJECTION, SOLUTION INTRAMUSCULAR; INTRAVENOUS at 11:21

## 2018-03-24 RX ADMIN — PROPOFOL 200 MCG/KG/MIN: 10 INJECTION, EMULSION INTRAVENOUS at 11:21

## 2018-03-24 RX ADMIN — DILTIAZEM HYDROCHLORIDE 120 MG: 120 CAPSULE, EXTENDED RELEASE ORAL at 09:15

## 2018-03-24 RX ADMIN — MIDAZOLAM 1 MG: 1 INJECTION INTRAMUSCULAR; INTRAVENOUS at 11:10

## 2018-03-24 RX ADMIN — HYDROMORPHONE HYDROCHLORIDE 0.5 MG: 1 INJECTION, SOLUTION INTRAMUSCULAR; INTRAVENOUS; SUBCUTANEOUS at 13:15

## 2018-03-24 RX ADMIN — AMITRIPTYLINE HYDROCHLORIDE 25 MG: 25 TABLET, FILM COATED ORAL at 21:19

## 2018-03-24 RX ADMIN — METRONIDAZOLE 500 MG: 500 INJECTION, SOLUTION INTRAVENOUS at 09:23

## 2018-03-24 RX ADMIN — FENTANYL CITRATE 50 MCG: 50 INJECTION, SOLUTION INTRAMUSCULAR; INTRAVENOUS at 11:43

## 2018-03-24 RX ADMIN — SCOLOPAMINE TRANSDERMAL SYSTEM 1 PATCH: 1 PATCH, EXTENDED RELEASE TRANSDERMAL at 11:28

## 2018-03-24 RX ADMIN — LIDOCAINE HYDROCHLORIDE 40 MG: 20 INJECTION, SOLUTION INFILTRATION; PERINEURAL at 11:21

## 2018-03-24 RX ADMIN — PANTOPRAZOLE SODIUM 40 MG: 40 INJECTION, POWDER, FOR SOLUTION INTRAVENOUS at 09:15

## 2018-03-24 RX ADMIN — METRONIDAZOLE 500 MG: 500 INJECTION, SOLUTION INTRAVENOUS at 01:51

## 2018-03-24 RX ADMIN — PROCHLORPERAZINE EDISYLATE 5 MG: 5 INJECTION INTRAMUSCULAR; INTRAVENOUS at 15:59

## 2018-03-24 RX ADMIN — SODIUM CHLORIDE 1000 ML: 9 INJECTION, SOLUTION INTRAVENOUS at 01:39

## 2018-03-24 RX ADMIN — PROPOFOL 50 MG: 10 INJECTION, EMULSION INTRAVENOUS at 11:43

## 2018-03-24 RX ADMIN — ONDANSETRON 4 MG: 2 INJECTION INTRAMUSCULAR; INTRAVENOUS at 13:55

## 2018-03-24 RX ADMIN — AMITRIPTYLINE HYDROCHLORIDE 25 MG: 25 TABLET, FILM COATED ORAL at 01:51

## 2018-03-24 RX ADMIN — ROCURONIUM BROMIDE 40 MG: 10 INJECTION INTRAVENOUS at 11:21

## 2018-03-24 RX ADMIN — SUGAMMADEX 100 MG: 100 INJECTION, SOLUTION INTRAVENOUS at 12:20

## 2018-03-24 RX ADMIN — FENTANYL CITRATE 50 MCG: 50 INJECTION, SOLUTION INTRAMUSCULAR; INTRAVENOUS at 13:00

## 2018-03-24 RX ADMIN — OXYBUTYNIN CHLORIDE 5 MG: 5 TABLET, EXTENDED RELEASE ORAL at 01:51

## 2018-03-24 RX ADMIN — METOPROLOL TARTRATE 2.5 MG: 5 INJECTION INTRAVENOUS at 11:48

## 2018-03-24 RX ADMIN — DEXAMETHASONE SODIUM PHOSPHATE 10 MG: 10 INJECTION, SOLUTION INTRAMUSCULAR; INTRAVENOUS at 11:26

## 2018-03-24 RX ADMIN — FENTANYL CITRATE 50 MCG: 50 INJECTION, SOLUTION INTRAMUSCULAR; INTRAVENOUS at 11:28

## 2018-03-24 RX ADMIN — SODIUM CHLORIDE, POTASSIUM CHLORIDE, SODIUM LACTATE AND CALCIUM CHLORIDE: 600; 310; 30; 20 INJECTION, SOLUTION INTRAVENOUS at 12:26

## 2018-03-24 RX ADMIN — ONDANSETRON 4 MG: 2 INJECTION INTRAMUSCULAR; INTRAVENOUS at 12:04

## 2018-03-24 RX ADMIN — ACETAMINOPHEN 650 MG: 325 TABLET ORAL at 21:18

## 2018-03-24 RX ADMIN — CEFTRIAXONE 1 G: 1 INJECTION, SOLUTION INTRAVENOUS at 01:51

## 2018-03-24 NOTE — ANESTHESIA PREPROCEDURE EVALUATION
Anesthesia Evaluation     . Pt has had prior anesthetic. Type: General    No history of anesthetic complications          ROS/MED HX    ENT/Pulmonary:       Neurologic:       Cardiovascular:     (+) Dyslipidemia, hypertension-range: < 140 / 90, ---. : . . . :. . Previous cardiac testing date:results:date: results:ECG reviewed date:3-23-18 results:Sinus Bradycardia   - Nonspecific T-abnormality. date: results:          METS/Exercise Tolerance:     Hematologic:     (+) Other Hematologic Disorder-Left carotid stenosis (asymptomatic)      Musculoskeletal:   (+) , , other musculoskeletal- Right sided sciatica      GI/Hepatic:     (+) GERD Asymptomatic on medication,       Renal/Genitourinary:     (+) Nephrolithiasis ,       Endo:     (+) type II DM Last HgA1c: 6.3 date: 9/27/13 Not using insulin - not using insulin pump Normal glucose range: 110-130 not previously admitted for DM/DKA Diabetic complications: neuropathy, thyroid problem hypothyroidism, .      Psychiatric:         Infectious Disease:         Malignancy:         Other:    (+) H/O Chronic Pain,                   Physical Exam  Normal systems: cardiovascular, pulmonary and dental    Airway   Mallampati: II  TM distance: >3 FB  Neck ROM: full    Dental     Cardiovascular   Rhythm and rate: regular and normal  (-) no murmur    Pulmonary    breath sounds clear to auscultation    Other findings: Held Lisinopril / Metformin this a.m.                Anesthesia Plan      History & Physical Review  History and physical reviewed and following examination; no interval change.    ASA Status:  2 .    NPO Status:  > 8 hours    Plan for General, RSI and ETT with Intravenous and Propofol induction. Maintenance will be Inhalation and Balanced.    PONV prophylaxis:  Ondansetron (or other 5HT-3) and Meclizine or Dimenhydrinate  Additional equipment: Videolaryngoscope      Postoperative Care  Postoperative pain management:  IV analgesics and Oral pain medications.       Consents  Anesthetic plan, risks, benefits and alternatives discussed with:  Patient or representative and Patient.  Use of blood products discussed: No .   .                            .    Anesthesia Plan      History & Physical Review  History and physical reviewed and following examination; no interval change.    ASA Status:  2 .    NPO Status:  > 8 hours    Plan for General, RSI and ETT with Intravenous and Propofol induction. Maintenance will be Inhalation and Balanced.    PONV prophylaxis:  Ondansetron (or other 5HT-3) and Meclizine or Dimenhydrinate  Additional equipment: Videolaryngoscope      Postoperative Care  Postoperative pain management:  IV analgesics and Oral pain medications.      Consents  Anesthetic plan, risks, benefits and alternatives discussed with:  Patient or representative and Patient.  Use of blood products discussed: No .   .

## 2018-03-24 NOTE — PROGRESS NOTES
S-(situation): Patient changed to inpatient status    B-(background): Patient status change due to observation goals not being met.     A-(assessment): patient has been very drowsy, awakens briefly to voice. Has been up to commode with assist of 1 twice. On 3-4 liters of oxygen, vital signs stable. Patient has been nauseated when awake. Scopolamine patch in place, received Zofran and Compazine as needed with relief until patient starts moving.     R-(recommendations):  . Will monitor patient per MD orders. yes      Inpatient nursing criteria listed below were met:    Adult Profile completedYes  Health care directives status obtained and documented: Yes  VTE prophylaxis orders: SCDs  (FYI: Asprin is not an approved anticoagulation for DVT prophylaxis)  SCD's Documented: Yes  Vaccine assessment done and vaccine ordered if needed. Yes  MRSA swab completed for patient 55 years and older (exclude JODIE and TKA): Yes  My Chart patient sign up addressed and documented: Yes  Care Plan initiated: Yes  Discharge planning review completed (admission navigator) Yes

## 2018-03-24 NOTE — PROGRESS NOTES
.S-(situation): Patient registered to Observation. Patient arrived to room 254 via wheelchair from ER    B-(background): Rt upper quad pain for some time but became worse today, hx gallstones    A-(assessment): A/O SBA to bed with no c/o pain    R-(recommendations): Orders and observation goals reviewed with patient    Nursing Observation criteria listed below was met:    Skin issues/needs documented: NA  Isolation needs addressed, if appropriate: NA  Fall Prevention: Education given and documented:Yes  Education Assessment documented: yes  Education Documented (Pre-existing chronic infection such as, MRSA/VRE need education on admission): yes   New medication patient education completed and documented (Possible Side Effects of Common Medications handout):  NA  Home medications if not able to send immediately home with family stored here:  NA  Reminder note placed in discharge instructions:  NA  Patient has discharge needs (If yes, please explain):  No

## 2018-03-24 NOTE — PROGRESS NOTES
Notified MD at 1800 PM regarding UPDATE on status.      Spoke with: Shani    Orders were obtained.  Patient changed to inpatient status    Comments: Patient requiring 4l/nc to maintain sats, lethargic.  N/V, compazine admin.  Receiving no pain meds since surgery.

## 2018-03-24 NOTE — OR NURSING
Transfer from  pacu to Room back to [facility]  Transferred to bed via slider sheet    S: 79 y/o f  S/P lap meghan       Anesthesia Type:  general       Surgeon:  Dr. Allen       Allergies:  See Medication Reconciliation Record       DNR: yes     B:  Pertinent Medical History:   Past Medical History:   Diagnosis Date     Arthritis      Diabetes type 2, controlled (H)      GERD (gastroesophageal reflux disease)      History of renal calculi      HTN, goal below 140/90      Hyperlipidaemia LDL goal < 100      Hypothyroid      Insomnia      Left carotid stenosis      Migraine      Sciatica of right side 6/28/2013      Surgical History:    Past Surgical History:   Procedure Laterality Date     BUNIONECTOMY      Right foot     CATARACT IOL, RT/LT Bilateral 2017     COLONOSCOPY  7/12/2013    Procedure: COLONOSCOPY;  Colonoscopy;  Surgeon: Giovany Espinoza MD;  Location: [facility]     FRACTURE TX, ANKLE RT/LT      Left ankle     HC CYSTOURETHROSCOPY W/ URETEROSCOPY &/OR PYELOSCOPY; W/ LITHOTRIPSY       HC REVISE MEDIAN N/CARPAL TUNNEL SURG      Right wrist     INJECT JOINT SACROILIAC  4/10/2014    Procedure: INJECT JOINT SACROILIAC;  Sacroiliac Joint Injection;  Surgeon: Gilbert Mcclure MD;  Location: [facility] OR     A:  EBL: less than 10        IVF:  NS 1000 documented per anesthesia, an additional 300 ml LR         UOP:  No void        NPO:  ___Yes X___No         Vomiting:  ___Yes X___No (has scopalamine patch placed behind right ear        Drainage: none        Skin Integrity: x4 small trocar incision sites abdomen, all sealed with operative glue         RFO: ___Yes_X__No (identify item if present)        Pain: pt sleepy, but initially restless early in recovery with soft moans, small increments of fentanyl 50 mcg and Dilaudid 1/2 mg given slowly with pt response settling, snoring softly, b/p to baseline       See PACU record for ongoing assessment, vital signs and pain assessment.       Report given to luz maria Rn on  med-surg floor      Transported with O2 same setting 5L mask, all VSS. Bedside hand-off given  R: Post-Op vitals and assessments as ordered/indicated per patient's condition.       Follow Post-Op orders and notify Physician prn.       Continue to involve patient/family in plan of care and discharge planning.       Reinforce Pre-Operative education.       Implement skin safety interventions as appropriate.  Name: Kayy CARPIO

## 2018-03-24 NOTE — ANESTHESIA CARE TRANSFER NOTE
Patient: Mya Hui    Procedure(s):  Laparoscopic Cholecystectomy - Wound Class: II-Clean Contaminated    Diagnosis: Acute Cholecystitis  Diagnosis Additional Information: No value filed.    Anesthesia Type:   General, RSI, ETT     Note:  Airway :Face Mask  Patient transferred to:ICU (for staffing only)  ICU Handoff: Call for PAUSE to initiate/utilize ICU HANDOFF, Identified Patient, Identified Responsible Provider, Reviewed the Pertinent Medical History, Discussed Surgical Course, Reviewed Intra-OP Anesthesia Management and Issues during Anesthesia, Set Expectations for Post Procedure Period and Allowed Opportunity for Questions and Acknowledgement of Understanding      Vitals: (Last set prior to Anesthesia Care Transfer)    CRNA VITALS  3/24/2018 1207 - 3/24/2018 1251      3/24/2018             Pulse: 73    SpO2: 92 %                Electronically Signed By: MAYI Sewell CRNA  March 24, 2018  12:51 PM

## 2018-03-24 NOTE — ED PROVIDER NOTES
History     Chief Complaint   Patient presents with     Abdominal Pain     The history is provided by the patient.     Mya Hui is a 78 year old female with a past medical history of hypertension, hyperlipidemia, Type II Diabetes and GERD who presents to the ED for evaluation of abdominal pain. Patient has had chronic epigastric pain for quite some time and she is also known to have a hiatal hernia. It was worse today.  She has known gallstones.  Thinks that might be the problem today.    Patient started having abdominal pain this afternoon.  She ate an English muffin and an egg around 1130. The pain radiates to the right scapula.      The pain will wax and wane. When the pain is present it will last for a half and hour and then resolve for a few hours. She doesn't relate the pain to eating food. She has noticed that when she eats a egg salad she will become nauseated. She has been drinking plenty of fluids. She rates her pain currently at 1/10.    Patient denies shortness of breath, nausea, vomiting, chest pain or any other associated symptoms.        Problem List:    Patient Active Problem List    Diagnosis Date Noted     Acute cholecystitis 03/23/2018     Priority: Medium     Gastroesophageal reflux disease, esophagitis presence not specified 09/14/2017     Priority: Medium     Sliding hiatal hernia 09/11/2017     Priority: Medium     Calculus of gallbladder without cholecystitis 09/11/2017     Priority: Medium     Elevated lactic acid level 09/10/2017     Priority: Medium     Constipation 09/10/2017     Priority: Medium     Acute kidney injury (H) 09/10/2017     Priority: Medium     Cough 09/10/2017     Priority: Medium     Dehydration 09/04/2017     Priority: Medium     Nausea and vomiting in adult 09/03/2017     Priority: Medium     Hypokalemia 09/03/2017     Priority: Medium     Rotator cuff arthropathy, right 08/28/2017     Priority: Medium     Chronic right shoulder pain 08/10/2017     Priority:  Medium     Right shoulder pain, unspecified chronicity 08/10/2017     Priority: Medium     Dry mouth 08/10/2017     Priority: Medium     Primary osteoarthritis involving multiple joints 06/16/2017     Priority: Medium     Cataract, bilateral 03/30/2017     Priority: Medium     Type 2 diabetes mellitus without complication, without long-term current use of insulin (H) 02/16/2017     Priority: Medium     Hypothyroidism, unspecified type 02/14/2017     Priority: Medium     Insomnia, unspecified 02/14/2017     Priority: Medium     OAB (overactive bladder) 09/07/2016     Priority: Medium     Essential hypertension with goal blood pressure less than 140/90 09/07/2016     Priority: Medium     Osteoarthritis of hip 08/21/2014     Priority: Medium     Do you wish to do the replacement in the background? yes         DDD (degenerative disc disease), lumbar 08/21/2014     Priority: Medium     Hearing aid worn 05/30/2014     Priority: Medium     Right ear       Hip pain 09/27/2013     Priority: Medium     Sciatica of right side 06/28/2013     Priority: Medium     Asymptomatic carotid artery stenosis 05/02/2013     Priority: Medium     Hyperlipidemia with target LDL less than 100      Priority: Medium     Diagnosis updated by automated process. Provider to review and confirm.       GERD (gastroesophageal reflux disease)      Priority: Medium     History of renal calculi      Priority: Medium     Nevus 04/10/2013     Priority: Medium     Do you wish to do the replacement in the background? yes         Advance Care Planning 04/04/2013     Priority: Medium     Advance Care Planning:   Receipt of ACP document:  Received: Health Care Directive which was witnessed or notarized on 3/18/10.  Document not previously scanned.  Validation form completed and sent with document to be scanned.  Code Status reflects choices in most recent ACP document.  Confirmed/documented designated decision maker(s). See permanent comments section of  demographics in clinical tab. View document(s) and details by clicking on code status.   Added by Jolie Wagner on 4/23/2014.                   Arthritis      Priority: Medium        Past Medical History:    Past Medical History:   Diagnosis Date     Arthritis      Diabetes type 2, controlled (H)      GERD (gastroesophageal reflux disease)      History of renal calculi      HTN, goal below 140/90      Hyperlipidaemia LDL goal < 100      Hypothyroid      Insomnia      Left carotid stenosis      Migraine      Sciatica of right side 6/28/2013       Past Surgical History:    Past Surgical History:   Procedure Laterality Date     BUNIONECTOMY      Right foot     CATARACT IOL, RT/LT Bilateral 2017     COLONOSCOPY  7/12/2013    Procedure: COLONOSCOPY;  Colonoscopy;  Surgeon: Giovany Espinoza MD;  Location: [facility]     FRACTURE TX, ANKLE RT/LT      Left ankle     HC CYSTOURETHROSCOPY W/ URETEROSCOPY &/OR PYELOSCOPY; W/ LITHOTRIPSY       HC REVISE MEDIAN N/CARPAL TUNNEL SURG      Right wrist     INJECT JOINT SACROILIAC  4/10/2014    Procedure: INJECT JOINT SACROILIAC;  Sacroiliac Joint Injection;  Surgeon: Gilbert Mcclure MD;  Location: [facility]       Family History:    Family History   Problem Relation Age of Onset     Hypertension Brother      CEREBROVASCULAR DISEASE Brother      DIABETES Father      Cardiovascular Father      DIABETES Brother      Borderline     Breast Cancer Mother      DIABETES Brother      Blood Disease Brother      Cardiovascular Brother      MI       Social History:  Marital Status:   [2]  Social History   Substance Use Topics     Smoking status: Never Smoker     Smokeless tobacco: Never Used     Alcohol use Yes        Medications:      CARTIA  MG 24 hr capsule   oxybutynin (DITROPAN) 5 MG tablet   pantoprazole (PROTONIX) 40 MG EC tablet   potassium chloride (K-TAB,KLOR-CON) 10 MEQ tablet   amitriptyline (ELAVIL) 25 MG tablet   lisinopril-hydrochlorothiazide (PRINZIDE/ZESTORETIC)  20-12.5 MG per tablet   metFORMIN (GLUCOPHAGE-XR) 500 MG 24 hr tablet   omeprazole (PRILOSEC) 20 MG CR capsule   oxybutynin (DITROPAN-XL) 5 MG 24 hr tablet   acetaminophen (TYLENOL) 325 MG tablet   pravastatin (PRAVACHOL) 40 MG tablet   levothyroxine (SYNTHROID/LEVOTHROID) 50 MCG tablet   blood glucose (NO BRAND SPECIFIED) lancets standard   blood glucose monitoring (NO BRAND SPECIFIED) test strip   Multiple Vitamins-Minerals (MULTIVITAL PO)         Review of Systems   All other systems reviewed and are negative.      All other ROS reviewed and are negative or non-contributory except as stated in HPI.    Physical Exam   BP: (!) 184/34  Heart Rate: 72  Temp: 98.5  F (36.9  C)  Resp: 18  SpO2: 98 %    Physical Exam   Constitutional: She is oriented to person, place, and time. She appears well-developed and well-nourished. No distress.   HENT:   Head: Normocephalic and atraumatic.   Mouth/Throat: Uvula is midline, oropharynx is clear and moist and mucous membranes are normal. No oral lesions. No oropharyngeal exudate, posterior oropharyngeal edema or posterior oropharyngeal erythema.   Eyes: Conjunctivae are normal. No scleral icterus.   Neck: Normal range of motion. Neck supple.   Cardiovascular: Normal rate, regular rhythm, normal heart sounds and intact distal pulses.  Exam reveals no gallop and no friction rub.    No murmur heard.  Pulmonary/Chest: Effort normal and breath sounds normal. No respiratory distress. She has no decreased breath sounds. She has no wheezes. She has no rhonchi. She has no rales.   Abdominal: Soft. Bowel sounds are normal. She exhibits no distension. There is tenderness (mild) in the epigastric area. There is no rigidity, no rebound and no guarding.   Lymphadenopathy:     She has no cervical adenopathy.   Neurological: She is alert and oriented to person, place, and time.   Skin: Skin is warm and dry. No rash noted. No pallor.   Psychiatric: She has a normal mood and affect. Her behavior is  normal.   Nursing note and vitals reviewed.    ED Course    IV placed.  Labs drawn.  She declined pain or nausea medication.    Right upper quadrant ultrasound ordered to further evaluate her gallbladder.    WBC was normal.  LFTs and lipase were also normal.  Ultrasound shows sludge and stones in the gallbladder neck.  Some mild gallbladder wall thickening up to 0.5 cm.    Positive sonographic Galicia sign and trace of pericholecystic fluid, suggesting cholecystitis.    I spoke with Dr. Allen from general surgery.  He asked us to put her in the hospital tonight and he will take her gallbladder out tomorrow.  In the meantime we will start her on Rocephin and Flagyl.  Her pain is tolerable at this time but she is scared it will recur if she were to try going home and she is probably right.    I then spoke with Dr. Seth, the hospitalist on call.  He will assume her care and write orders.       ED Course     Procedures               EKG Interpretation:      Interpreted by Josh James  Time reviewed: 2230  Symptoms at time of EKG: epigastric pain   Rhythm: Sinus bradycardia  Rate: 59 bpm  Axis: normal  Ectopy: none  Conduction: normal  ST Segments/ T Waves: No ST-T wave changes  Q Waves: none  Comparison to prior: Unchanged from 9/3/2017    Clinical Impression: normal EKG          Critical Care time:  none               Results for orders placed or performed during the hospital encounter of 03/23/18 (from the past 24 hour(s))   CBC with platelets differential   Result Value Ref Range    WBC 8.1 4.0 - 11.0 10e9/L    RBC Count 4.55 3.8 - 5.2 10e12/L    Hemoglobin 13.3 11.7 - 15.7 g/dL    Hematocrit 41.0 35.0 - 47.0 %    MCV 90 78 - 100 fl    MCH 29.2 26.5 - 33.0 pg    MCHC 32.4 31.5 - 36.5 g/dL    RDW 13.4 10.0 - 15.0 %    Platelet Count 225 150 - 450 10e9/L    Diff Method Automated Method     % Neutrophils 74.7 %    % Lymphocytes 17.3 %    % Monocytes 7.3 %    % Eosinophils 0.1 %    % Basophils 0.2 %     % Immature Granulocytes 0.4 %    Absolute Neutrophil 6.1 1.6 - 8.3 10e9/L    Absolute Lymphocytes 1.4 0.8 - 5.3 10e9/L    Absolute Monocytes 0.6 0.0 - 1.3 10e9/L    Absolute Eosinophils 0.0 0.0 - 0.7 10e9/L    Absolute Basophils 0.0 0.0 - 0.2 10e9/L    Abs Immature Granulocytes 0.0 0 - 0.4 10e9/L   Comprehensive metabolic panel   Result Value Ref Range    Sodium 142 133 - 144 mmol/L    Potassium 3.8 3.4 - 5.3 mmol/L    Chloride 107 94 - 109 mmol/L    Carbon Dioxide 23 20 - 32 mmol/L    Anion Gap 12 3 - 14 mmol/L    Glucose 155 (H) 70 - 99 mg/dL    Urea Nitrogen 25 7 - 30 mg/dL    Creatinine 0.82 0.52 - 1.04 mg/dL    GFR Estimate 68 >60 mL/min/1.7m2    GFR Estimate If Black 82 >60 mL/min/1.7m2    Calcium 8.9 8.5 - 10.1 mg/dL    Bilirubin Total 0.3 0.2 - 1.3 mg/dL    Albumin 3.8 3.4 - 5.0 g/dL    Protein Total 7.3 6.8 - 8.8 g/dL    Alkaline Phosphatase 66 40 - 150 U/L    ALT 28 0 - 50 U/L    AST 19 0 - 45 U/L   Lipase   Result Value Ref Range    Lipase 124 73 - 393 U/L   Troponin I   Result Value Ref Range    Troponin I ES 0.032 0.000 - 0.045 ug/L   US Abdomen Limited    Narrative    US ABDOMEN LIMITED 3/23/2018 9:44 PM    CLINICAL INFORMATION: Right upper quadrant and epigastric pain.    COMPARISON: CT 9/3/2017.    FINDINGS: Limited right upper quadrant ultrasound demonstrates some  echogenicity at the gallbladder neck consistent with stones and  probably some sludge. Mild gallbladder wall thickening up to 0.5 cm in  thickness. Positive sonographic Galicia's sign and a trace of  pericholecystic fluid. No bile duct dilatation. Slight increased liver  echogenicity. Pancreas is mostly obscured. Visualized pancreatic head  is unremarkable. Right kidney is negative. No hydronephrosis.      Impression    IMPRESSION:  1. Sludge and stones in the gallbladder neck.  2. Slight gallbladder wall thickening, positive sonographic Galicia's  sign and trace pericholecystic fluid suggest cholecystitis.  3. Fatty infiltration of the  liver.    ANGELO DENT MD       Medications   0.9% sodium chloride BOLUS (1,000 mLs Intravenous New Bag 3/23/18 2112)       Assessments & Plan (with Medical Decision Making)    (K81.0) Acute cholecystitis  Comment:   Plan: She will stay in the hospital tonight and be n.p.o. for surgery tomorrow.  See discussion above.          I have reviewed the nursing notes.    I have reviewed the findings, diagnosis, plan and need for follow up with the patient.       ED to Inpatient Handoff:    Discussed with Dr Seth at 2250  Patient accepted for Observation Stay  Pending studies include non3  Code Status: Not Addressed           New Prescriptions    No medications on file       Final diagnoses:   Acute cholecystitis     This document serves as a record of services personally performed by Josh James MD. It was created on their behalf by Lisandra West, a trained medical scribe. The creation of this record is based on the provider's personal observations and the statements of the patient. This document has been checked and approved by the attending provider.    Note: Chart documentation done in part with Dragon Voice Recognition software. Although reviewed after completion, some word and grammatical errors may remain.      3/23/2018   [facility] EMERGENCY DEPARTMENT     Josh James MD  03/23/18 1095

## 2018-03-24 NOTE — ED NOTES
"Patient has had a chronic epigastric pain for \"a long time\".  Patient has a known hiatal hernia.  Patient states sharp type pain significantly worsened today.  Patient also has back pain.  Known gallstones.  Med Rec not completed in triage.  "

## 2018-03-24 NOTE — CONSULTS
Patient seen in consultation for acute cholecystitis    HPI:  Patient is a 78 year old female with 1-2 day history of worsening RUQ, epigastric and R flank pain. She states that shes had trouble with these symptoms for a month or so but yesterday the pain got worse and didn't go away. She went to ED for evaluation and workup revealed acute cholecystitis on US with gallbladder wall thickening, ramona-cholecystic fluid and carlson's sign. LFTs, bili and lipase are WNL. She was admitted to hospital with ABX and fluids overnight and plan for OR today. She does not take any blood thinners. No previous abdominal surgeries.     Review Of Systems    Skin: negative  Ears/Nose/Throat: negative  Respiratory: No shortness of breath, dyspnea on exertion, cough, or hemoptysis  Cardiovascular: negative  Gastrointestinal: as above  Genitourinary: negative  Musculoskeletal: negative  Neurologic: negative  Hematologic/Lymphatic/Immunologic: negative  Endocrine: negative      Past Medical History:   Diagnosis Date     Arthritis      Diabetes type 2, controlled (H)      GERD (gastroesophageal reflux disease)      History of renal calculi      HTN, goal below 140/90      Hyperlipidaemia LDL goal < 100      Hypothyroid      Insomnia      Left carotid stenosis      Migraine      Sciatica of right side 6/28/2013       Past Surgical History:   Procedure Laterality Date     BUNIONECTOMY      Right foot     CATARACT IOL, RT/LT Bilateral 2017     COLONOSCOPY  7/12/2013    Procedure: COLONOSCOPY;  Colonoscopy;  Surgeon: Giovany Espinoza MD;  Location: [facility]     FRACTURE TX, ANKLE RT/LT      Left ankle     HC CYSTOURETHROSCOPY W/ URETEROSCOPY &/OR PYELOSCOPY; W/ LITHOTRIPSY       HC REVISE MEDIAN N/CARPAL TUNNEL SURG      Right wrist     INJECT JOINT SACROILIAC  4/10/2014    Procedure: INJECT JOINT SACROILIAC;  Sacroiliac Joint Injection;  Surgeon: Gilbert Mcclure MD;  Location: [facility]       Family History   Problem Relation Age of Onset      "Hypertension Brother      CEREBROVASCULAR DISEASE Brother      DIABETES Father      Cardiovascular Father      DIABETES Brother      Borderline     Breast Cancer Mother      DIABETES Brother      Blood Disease Brother      Cardiovascular Brother      MI       Social History     Social History     Marital status:      Spouse name: N/A     Number of children: N/A     Years of education: N/A     Occupational History      Retired           [employer] office part time     Social History Main Topics     Smoking status: Never Smoker     Smokeless tobacco: Never Used     Alcohol use Yes     Drug use: No     Sexual activity: Not Currently     Other Topics Concern     Not on file     Social History Narrative       No current outpatient prescriptions on file.       Medications and history reviewed    Physical exam:  Vitals: /88  Pulse 59  Temp 98.1  F (36.7  C) (Oral)  Resp 16  Ht 1.702 m (5' 7\")  Wt 92 kg (202 lb 13.2 oz)  SpO2 96%  BMI 31.77 kg/m2  BMI= Body mass index is 31.77 kg/(m^2).    Constitutional: Healthy, alert, non-distressed   Head: Normo-cephalic, atraumatic, no lesions, masses or tenderness   Cardiovascular: RRR, no new murmurs, +S1, +S2 heart sounds, no clicks, rubs or gallops   Respiratory: CTAB, no rales, rhonchi or wheezing, equal chest rise, good respiratory effort   Gastrointestinal: Soft, mild tenderness in RUQ, non distended, no rebound rigidity or guarding, no masses or hernias palpated   : Deferred  Musculoskeletal: Moves all extremities, normal  strength, no deformities noted   Skin: No suspicious lesions or rashes   Psychiatric: Mentation appears normal, affect appropriate   Hematologic/Lymphatic/Immunologic: Normal cervical and supraclavicular lymph nodes   Patient able to get up on table without difficulty.    Labs show:  Results for orders placed or performed during the hospital encounter of 03/23/18 (from the past 24 hour(s))   CBC with platelets differential   Result " Value Ref Range    WBC 8.1 4.0 - 11.0 10e9/L    RBC Count 4.55 3.8 - 5.2 10e12/L    Hemoglobin 13.3 11.7 - 15.7 g/dL    Hematocrit 41.0 35.0 - 47.0 %    MCV 90 78 - 100 fl    MCH 29.2 26.5 - 33.0 pg    MCHC 32.4 31.5 - 36.5 g/dL    RDW 13.4 10.0 - 15.0 %    Platelet Count 225 150 - 450 10e9/L    Diff Method Automated Method     % Neutrophils 74.7 %    % Lymphocytes 17.3 %    % Monocytes 7.3 %    % Eosinophils 0.1 %    % Basophils 0.2 %    % Immature Granulocytes 0.4 %    Absolute Neutrophil 6.1 1.6 - 8.3 10e9/L    Absolute Lymphocytes 1.4 0.8 - 5.3 10e9/L    Absolute Monocytes 0.6 0.0 - 1.3 10e9/L    Absolute Eosinophils 0.0 0.0 - 0.7 10e9/L    Absolute Basophils 0.0 0.0 - 0.2 10e9/L    Abs Immature Granulocytes 0.0 0 - 0.4 10e9/L   Comprehensive metabolic panel   Result Value Ref Range    Sodium 142 133 - 144 mmol/L    Potassium 3.8 3.4 - 5.3 mmol/L    Chloride 107 94 - 109 mmol/L    Carbon Dioxide 23 20 - 32 mmol/L    Anion Gap 12 3 - 14 mmol/L    Glucose 155 (H) 70 - 99 mg/dL    Urea Nitrogen 25 7 - 30 mg/dL    Creatinine 0.82 0.52 - 1.04 mg/dL    GFR Estimate 68 >60 mL/min/1.7m2    GFR Estimate If Black 82 >60 mL/min/1.7m2    Calcium 8.9 8.5 - 10.1 mg/dL    Bilirubin Total 0.3 0.2 - 1.3 mg/dL    Albumin 3.8 3.4 - 5.0 g/dL    Protein Total 7.3 6.8 - 8.8 g/dL    Alkaline Phosphatase 66 40 - 150 U/L    ALT 28 0 - 50 U/L    AST 19 0 - 45 U/L   Lipase   Result Value Ref Range    Lipase 124 73 - 393 U/L   Troponin I   Result Value Ref Range    Troponin I ES 0.032 0.000 - 0.045 ug/L   TSH with free T4 reflex   Result Value Ref Range    TSH 2.23 0.40 - 4.00 mU/L   US Abdomen Limited    Narrative    US ABDOMEN LIMITED 3/23/2018 9:44 PM    CLINICAL INFORMATION: Right upper quadrant and epigastric pain.    COMPARISON: CT 9/3/2017.    FINDINGS: Limited right upper quadrant ultrasound demonstrates some  echogenicity at the gallbladder neck consistent with stones and  probably some sludge. Mild gallbladder wall thickening up  to 0.5 cm in  thickness. Positive sonographic Galicia's sign and a trace of  pericholecystic fluid. No bile duct dilatation. Slight increased liver  echogenicity. Pancreas is mostly obscured. Visualized pancreatic head  is unremarkable. Right kidney is negative. No hydronephrosis.      Impression    IMPRESSION:  1. Sludge and stones in the gallbladder neck.  2. Slight gallbladder wall thickening, positive sonographic Galicia's  sign and trace pericholecystic fluid suggest cholecystitis.  3. Fatty infiltration of the liver.    ANGELO DENT MD   Glucose by meter   Result Value Ref Range    Glucose 121 (H) 70 - 99 mg/dL   Glucose by meter   Result Value Ref Range    Glucose 124 (H) 70 - 99 mg/dL   Glucose by meter   Result Value Ref Range    Glucose 103 (H) 70 - 99 mg/dL         Assessment:     ICD-10-CM    1. Acute cholecystitis K81.0 TSH with free T4 reflex     Glucose by meter     Glucose by meter     Glucose by meter     Glucose by meter     Glucose by meter     Glucose by meter     Plan: To OR for laparoscopic cholecystectomy, possible open. We will dc ABX post-procedure unless purulence or necrosis encountered. She will be observed post-op for resolution of pain and discharged to home either later today or tomorrow barring any complications from surgery or recovery. Risks, benefits, alternatives and description of the procedure all described to patient. She understands and we will proceed with surgery.    Berry Allen DO

## 2018-03-24 NOTE — H&P
[facility]    History and Physical  Hospitalist       Date of Admission:  3/23/2018  Date of Service (when I saw the patient): 03/23/18    Assessment & Plan       Active Problems:    Acute cholecystitis    Assessment: presents with acute on chronic abdominal pain with a history and US findings consistent with acute cholecystitis complicating chronic symptomatic cholelithiasis.  No evidence of stone obstruction or signs of sepsis.  No peritoneal signs on exam.  Surgery consulted through the ED and they recommended antibiotics tonight and then gallbladder removal tomorrow.  She has had previous surgery without excessive bleeding or major anesthetic reactions.  No FH of death from anesthesia.  She does not have any known cardiorespiratory disease and does not have symptoms suggesting occult cardiac or respiratory disease.      Plan: register to observation, NPO, IV fluids, IV narcotics as needed for pain, antiemetics as needed, surgery to perform cholecystectomy tomorrow with discharge anticipated after surgery.  Cleared for surgery/anesthesia      Essential hypertension with goal blood pressure less than 140/90    Assessment: BP mildly elevated likely due to pain but improved in the ED    Plan: continue diltiazem but hold ACEI/diuretic for now      Type 2 diabetes mellitus without complication, without long-term current use of insulin (H)    Assessment: has been well controlled with metformin    Plan: hold metformin, check sugars every 4 hours and cover with correction scale as needed      Hyperlipidemia with target LDL less than 100    Assessment: chronic    Plan: resume statin at discharge      OAB (overactive bladder)    Assessment: chronic    Plan: continue oxybutynin      Hypothyroidism, unspecified type    Assessment: chronic    Plan: resume synthroid at discharge      Gastroesophageal reflux disease, esophagitis presence not specified    Assessment: chronic and controlled     Plan: IV PPI for now      # Pain Assessment:  Current Pain Score 3/23/2018   Patient currently in pain? -   Pain score (0-10) 1   Pain location -   Pain descriptors -   - Mya was experiencing pain due to acute cholecystitis but her pain has now resolved. Pain management was discussed and the plan was created in a collaborative fashion.  Mya's response to the current recommendations: engaged  - Opioid regimen: dilaudid prn  - Response to opioid medications: Reduction of symptoms   - Bowel regimen: not needed          DVT Prophylaxis: anticipate less than 24 hour stay  Code Status: DNR / DNI - discussed and confirmed with her today    Disposition: Expected discharge in 1 days once cholecystectomy completed.    Giovany Seth MD    Primary Care Physician   Chanell Nelson    Chief Complaint   Abdominal pain    History is obtained from the patient    History of Present Illness   Mya Hui is a 78 year old female who presents with abdominal pain.  She has had intermittent epigastric to RUQ abdominal pain for many months.  She has not noticed an association with eating.  Her pain will generally last an hour or so and then subside.  Her pain started again late this afternoon.  She had associated nausea but no vomiting.  No diarrhea noted.  Her pain was epigastric and RUQ with radiation into her right scapula.  Her pain has now subsided.  Due to the severity of the pain today she presented to the ED.  Her RUQ US is showing gallbladder wall thickening with pericholecystic fluid and positive sonographic Galicia sign.  Patient now being registered to observation with plans as outlined above.    Past Medical History    I have reviewed this patient's medical history and updated it with pertinent information if needed.   Past Medical History:   Diagnosis Date     Arthritis      Diabetes type 2, controlled (H)      GERD (gastroesophageal reflux disease)      History of renal calculi      HTN, goal below 140/90       Hyperlipidaemia LDL goal < 100      Hypothyroid      Insomnia      Left carotid stenosis      Migraine      Sciatica of right side 6/28/2013       Past Surgical History   I have reviewed this patient's surgical history and updated it with pertinent information if needed.  Past Surgical History:   Procedure Laterality Date     BUNIONECTOMY      Right foot     CATARACT IOL, RT/LT Bilateral 2017     COLONOSCOPY  7/12/2013    Procedure: COLONOSCOPY;  Colonoscopy;  Surgeon: Giovany Espinoza MD;  Location: [facility]     FRACTURE TX, ANKLE RT/LT      Left ankle     HC CYSTOURETHROSCOPY W/ URETEROSCOPY &/OR PYELOSCOPY; W/ LITHOTRIPSY       HC REVISE MEDIAN N/CARPAL TUNNEL SURG      Right wrist     INJECT JOINT SACROILIAC  4/10/2014    Procedure: INJECT JOINT SACROILIAC;  Sacroiliac Joint Injection;  Surgeon: Gilbert Mcclure MD;  Location: [facility]       Prior to Admission Medications   Prior to Admission Medications   Prescriptions Last Dose Informant Patient Reported? Taking?   CARTIA  MG 24 hr capsule   No No   Sig: TAKE ONE CAPSULE BY MOUTH ONCE DAILY   Multiple Vitamins-Minerals (MULTIVITAL PO)   Yes No   Sig: Take  by mouth. daily   acetaminophen (TYLENOL) 325 MG tablet   Yes No   Sig: Take 2 tablets (650 mg) by mouth every 4 hours as needed for mild pain   amitriptyline (ELAVIL) 25 MG tablet   No No   Sig: TAKE ONE TABLET BY MOUTH AT BEDTIME   blood glucose (NO BRAND SPECIFIED) lancets standard   No No   Sig: Glucose test strips  Use to test blood sugar 1 times daily or as directed.   blood glucose monitoring (NO BRAND SPECIFIED) test strip   No No   Sig: Use to test blood sugars 1 times daily or as directed   levothyroxine (SYNTHROID/LEVOTHROID) 50 MCG tablet   No No   Sig: Take 1 tablet (50 mcg) by mouth daily   lisinopril-hydrochlorothiazide (PRINZIDE/ZESTORETIC) 20-12.5 MG per tablet   No No   Sig: Take 1 tablet by mouth 2 times daily Do not restart until 9/12/17   metFORMIN (GLUCOPHAGE-XR) 500 MG 24 hr  tablet   Yes No   Sig: ONE TABLET BY MOUTH EVERY MORNING AND TAKE TWO TABLETS BY MOUTH EVERY EVENING WITH MEALS   omeprazole (PRILOSEC) 20 MG CR capsule   No No   Sig: Take 1 capsule (20 mg) by mouth every evening   oxybutynin (DITROPAN) 5 MG tablet   No No   Sig: TAKE TWO TABLETS BY MOUTH TWICE A DAY AS NEEDED BLADDER SPASMS   oxybutynin (DITROPAN-XL) 5 MG 24 hr tablet   Yes No   Sig: Take by mouth daily   pantoprazole (PROTONIX) 40 MG EC tablet   No No   Sig: Take 1 tablet (40 mg) by mouth 2 times daily Take 30-60 minutes before a meal.   potassium chloride (K-TAB,KLOR-CON) 10 MEQ tablet   No No   Sig: TAKE ONE TABLET BY MOUTH THREE TIMES A DAY   pravastatin (PRAVACHOL) 40 MG tablet   No No   Sig: TAKE ONE TABLET BY MOUTH ONCE DAILY      Facility-Administered Medications: None     Allergies   Allergies   Allergen Reactions     Codeine Nausea     Fentanyl Nausea and Vomiting     VERY sensitive to most narcotics if not all.       Social History   I have reviewed this patient's social history and updated it with pertinent information if needed. Mya Hui  reports that she has never smoked. She has never used smokeless tobacco. She reports that she drinks alcohol. She reports that she does not use illicit drugs.    Family History   I have reviewed this patient's family history and updated it with pertinent information if needed.   Family History   Problem Relation Age of Onset     Hypertension Brother      CEREBROVASCULAR DISEASE Brother      DIABETES Father      Cardiovascular Father      DIABETES Brother      Borderline     Breast Cancer Mother      DIABETES Brother      Blood Disease Brother      Cardiovascular Brother      MI       Review of Systems   The 10 point Review of Systems is negative other than noted in the HPI or here.     Physical Exam   Temp: 98.5  F (36.9  C) Temp src: Oral BP: (!) 184/34   Heart Rate: 72 Resp: 18 SpO2: 98 % O2 Device: None (Room air)    Vital Signs with Ranges  Temp:  [98.5   F (36.9  C)] 98.5  F (36.9  C)  Heart Rate:  [72] 72  Resp:  [18] 18  BP: (184)/(34) 184/34  SpO2:  [98 %] 98 %  0 lbs 0 oz    Constitutional:   awake, alert, cooperative, no apparent distress, and appears stated age     Eyes:   Lids and lashes normal, pupils equal, round and reactive to light, extra ocular muscles intact, sclera clear, conjunctiva normal     ENT:   Normocephalic, without obvious abnormality, atraumatic, sinuses nontender on palpation, external ears without lesions, oral pharynx with moist mucous membranes, tonsils without erythema or exudates, gums normal and good dentition.     Neck:   Supple, symmetrical, trachea midline, no adenopathy, thyroid symmetric, not enlarged and no tenderness, skin normal     Hematologic / Lymphatic:   no cervical lymphadenopathy and no supraclavicular lymphadenopathy     Back:   Symmetric, no curvature, spinous processes are non-tender on palpation, paraspinous muscles are non-tender on palpation, no costal vertebral tenderness     Lungs:   No increased work of breathing, good air exchange, clear to auscultation bilaterally, no crackles or wheezing     Cardiovascular:   Normal apical impulse, regular rate and rhythm, normal S1 and S2, no S3 or S4, and no murmur noted     Abdomen:   normal bowel sounds, soft, non-distended, surprisingly non tender currently, no masses palpated, no hepatosplenomegally     Neurologic:   Awake, alert, oriented to name, place and time.  Cranial nerves II-XII are grossly intact.  Motor is 5 out of 5 bilaterally.   Sensory is intact.        Data   Data reviewed today:  I personally reviewed the EKG tracing showing NSR without acute ischemic changes.  Borderline bradycardia noted on her EKG    Recent Labs  Lab 03/23/18  2105   WBC 8.1   HGB 13.3   MCV 90         POTASSIUM 3.8   CHLORIDE 107   CO2 23   BUN 25   CR 0.82   ANIONGAP 12   TERESA 8.9   *   ALBUMIN 3.8   PROTTOTAL 7.3   BILITOTAL 0.3   ALKPHOS 66   ALT 28   AST 19    LIPASE 124   TROPI 0.032       Recent Results (from the past 24 hour(s))   US Abdomen Limited    Narrative    US ABDOMEN LIMITED 3/23/2018 9:44 PM    CLINICAL INFORMATION: Right upper quadrant and epigastric pain.    COMPARISON: CT 9/3/2017.    FINDINGS: Limited right upper quadrant ultrasound demonstrates some  echogenicity at the gallbladder neck consistent with stones and  probably some sludge. Mild gallbladder wall thickening up to 0.5 cm in  thickness. Positive sonographic Galicia's sign and a trace of  pericholecystic fluid. No bile duct dilatation. Slight increased liver  echogenicity. Pancreas is mostly obscured. Visualized pancreatic head  is unremarkable. Right kidney is negative. No hydronephrosis.      Impression    IMPRESSION:  1. Sludge and stones in the gallbladder neck.  2. Slight gallbladder wall thickening, positive sonographic Galicia's  sign and trace pericholecystic fluid suggest cholecystitis.  3. Fatty infiltration of the liver.    ANGELO DENT MD

## 2018-03-24 NOTE — DISCHARGE INSTRUCTIONS
DISCHARGE INSTRUCTIONS FOR PATIENT   SCOPOLAMINE TRANSDERMAL PATCH  You may leave the patch on behind your ear for three days, but NO LONGER. You may have withdrawal symptoms (nausea, vomiting, headache, dizziness) if used longer.  When you remove the patch, you may wash and dry your hands thoroughly and before touching your eyes, as pupil may dilate.  Discard patch (away from children and pets).  You may develop some urinary hesitancy or urine retention.      Message left for [facility] to call you to set up an appointment with Dr. Allen within 2 weeks. If you do not hear from them, call [phone]

## 2018-03-24 NOTE — PROGRESS NOTES
Patient up during night to void, has urgency due to lack of bladder control, not new.  C/o pain at 1 out of 10.  VS WNL.  On room air.  Waiting to have surgery today than go home to recover.

## 2018-03-24 NOTE — BRIEF OP NOTE
[facility] Brief Operative Note    Pre-operative diagnosis: Acute Cholecystitis   Post-operative diagnosis acute cholecystitis   Procedure: Procedure(s):  Laparoscopic Cholecystectomy - Wound Class: II-Clean Contaminated   Surgeon: Berry Allen DO   Assistants(s): none   Estimated blood loss: Less than 10 ml    Specimens: GB and contents   Findings: cholelithiasis

## 2018-03-24 NOTE — PROGRESS NOTES
S- Transfer to M/S from PACU.    B- CHOLESYSTECTOMY    A- Brief systems assessment: Temp: 97.9  F (36.6  C) Temp src: Axillary BP: 173/86 Pulse: 72 Heart Rate: 74 Resp: 19 SpO2: 94 % O2 Device: Nasal cannula Oxygen Delivery: 4 LPM  Pt snoring, lethargic, absence of nonverbal indicators of pain.    R- Transfer to M/S floor per physician orders. Continue to monitor pt and update physician as needed. Monitoring ETCO2.     Code status: DNR / DNI  Skin: 4x trocar sites WNL  Fall Risk: Yes- Department fall risk interventions implemented.  Isolation: None  Medication drips upon transfer: LR

## 2018-03-24 NOTE — ANESTHESIA POSTPROCEDURE EVALUATION
Patient: Mya Hui    Procedure(s):  Laparoscopic Cholecystectomy - Wound Class: II-Clean Contaminated    Diagnosis:Acute Cholecystitis  Diagnosis Additional Information: No value filed.    Anesthesia Type:  General, RSI, ETT    Note:  Anesthesia Post Evaluation    Patient location during evaluation: PACU and ICU  Patient participation: Able to fully participate in evaluation  Level of consciousness: awake  Pain management: adequate  Airway patency: patent  Cardiovascular status: acceptable and hemodynamically stable  Respiratory status: acceptable, room air and nonlabored ventilation  Hydration status: stable  PONV: none     Anesthetic complications: None    Comments: Patient was happy with the anesthesia care received and no anesthesia related complications were noted so far.  She is waking faster than her previous anesthetics.  I will follow up with the patient again if it is needed.        Last vitals:  Vitals:    03/24/18 0910 03/24/18 1239 03/24/18 1245   BP: 172/88 150/83 162/85   Pulse:      Resp: 16 12 16   Temp: 98.1  F (36.7  C) 97.2  F (36.2  C)    SpO2: 96%           Electronically Signed By: MAYI Sewell CRNA  March 24, 2018  12:55 PM

## 2018-03-24 NOTE — PROGRESS NOTES
S- Transfer to OR from M/S.    B- choleysistitis    A- Brief systems assessment: Temp: 98.1  F (36.7  C) Temp src: Oral BP: 172/88 Pulse: 59 Heart Rate: 66 Resp: 16 SpO2: 96 % O2 Device: None (Room air)   Pt denies pain.  Pre-op cleanse completed.  Voided prior to transfer    R- Transfer to OR per physician orders. Continue to monitor pt and update physician as needed.      Code status: DNR / DNI  Skin: WNL  Fall Risk: Yes- Department fall risk interventions implemented.  Isolation: None  Medication drips upon transfer: NS Maintenance IVF

## 2018-03-25 VITALS
WEIGHT: 202.82 LBS | HEART RATE: 60 BPM | DIASTOLIC BLOOD PRESSURE: 79 MMHG | OXYGEN SATURATION: 95 % | TEMPERATURE: 98.2 F | RESPIRATION RATE: 20 BRPM | HEIGHT: 67 IN | BODY MASS INDEX: 31.83 KG/M2 | SYSTOLIC BLOOD PRESSURE: 151 MMHG

## 2018-03-25 LAB
BACTERIA SPEC CULT: NORMAL
GLUCOSE BLDC GLUCOMTR-MCNC: 126 MG/DL (ref 70–99)
GLUCOSE BLDC GLUCOMTR-MCNC: 131 MG/DL (ref 70–99)
SPECIMEN SOURCE: NORMAL

## 2018-03-25 PROCEDURE — A9270 NON-COVERED ITEM OR SERVICE: HCPCS | Mod: GY | Performed by: INTERNAL MEDICINE

## 2018-03-25 PROCEDURE — 99232 SBSQ HOSP IP/OBS MODERATE 35: CPT | Performed by: INTERNAL MEDICINE

## 2018-03-25 PROCEDURE — 25000132 ZZH RX MED GY IP 250 OP 250 PS 637: Mod: GY | Performed by: INTERNAL MEDICINE

## 2018-03-25 PROCEDURE — 00000146 ZZHCL STATISTIC GLUCOSE BY METER IP

## 2018-03-25 RX ORDER — LISINOPRIL 10 MG/1
10 TABLET ORAL DAILY
Qty: 30 TABLET | Refills: 0 | Status: SHIPPED | OUTPATIENT
Start: 2018-03-25 | End: 2018-04-17

## 2018-03-25 RX ORDER — LISINOPRIL 10 MG/1
10 TABLET ORAL DAILY
Status: DISCONTINUED | OUTPATIENT
Start: 2018-03-25 | End: 2018-03-25 | Stop reason: HOSPADM

## 2018-03-25 RX ADMIN — LISINOPRIL 10 MG: 10 TABLET ORAL at 08:12

## 2018-03-25 RX ADMIN — ACETAMINOPHEN 650 MG: 325 TABLET ORAL at 12:53

## 2018-03-25 RX ADMIN — ACETAMINOPHEN 650 MG: 325 TABLET ORAL at 09:05

## 2018-03-25 RX ADMIN — DILTIAZEM HYDROCHLORIDE 120 MG: 120 CAPSULE, EXTENDED RELEASE ORAL at 08:12

## 2018-03-25 NOTE — PROGRESS NOTES
[facility]    Hospitalist Progress Note    Date of Service (when I saw the patient): 03/25/2018    Assessment & Plan     Mya Hui is a 78 year old female who was admitted on 3/23/2018.     Active Problems:    Acute cholecystitis    Assessment: pain controlled.  No nausea.  Has not had anything to eat yet    Plan: per surgery.  Anticipate she will be discharged today      Essential hypertension with goal blood pressure less than 140/90    Assessment: BP slightly high    Plan: add lisinopril      Type 2 diabetes mellitus without complication, without long-term current use of insulin (H)    Assessment: BS controlled    Plan: will be okay to resume metformin at discharge      Hyperlipidemia with target LDL less than 100    Assessment: chronic    Plan: can resume statin at discharge      OAB (overactive bladder)    Assessment: stable     Plan: resume oxybutynin at discharge      Hypothyroidism, unspecified type    Assessment: stable    Plan: resume replacement at discharge      Gastroesophageal reflux disease, esophagitis presence not specified    Assessment: controlled    Plan: no change      # Pain Assessment:  Current Pain Score 3/24/2018   Patient currently in pain? -   Pain score (0-10) 1   Pain location -   Pain descriptors -   - Mya is experiencing pain due to lap meghan. Pain management was discussed and the plan was created in a collaborative fashion.  Mya's response to the current recommendations: engaged  - per surgery          DVT Prophylaxis: Defer to primary service  Code Status: DNR/DNI    Disposition: Expected discharge per surgery    Giovany Seth MD    Interval History   Pain with palpation or pressure on the right side of her abdomen.  No nausea.  Has not had anything to eat yet    -Data reviewed today: I reviewed all new labs and imaging results over the last 24 hours. I personally reviewed no images or EKG's today.    Physical Exam   Temp: 97  F  (36.1  C) Temp src: Oral BP: 145/73 Pulse: 64 Heart Rate: 74 Resp: 18 SpO2: 94 % O2 Device: Nasal cannula Oxygen Delivery: 2 LPM  Vitals:    03/24/18 0045   Weight: 92 kg (202 lb 13.2 oz)     Vital Signs with Ranges  Temp:  [96.7  F (35.9  C)-98.2  F (36.8  C)] 97  F (36.1  C)  Pulse:  [59-74] 64  Heart Rate:  [66-74] 74  Resp:  [12-22] 18  BP: (124-181)/(52-91) 145/73  SpO2:  [90 %-96 %] 94 %  I/O last 3 completed shifts:  In: 1200 [I.V.:1200]  Out: -     Lungs:  CTA auscultation throughout        Cardiovascular:   RRR with no murmur, rub or gallop     Abdomen:   +BS.  Soft, NT except for incisional tenderness         Medications       lisinopril  10 mg Oral Daily     amitriptyline  25 mg Oral At Bedtime     diltiazem  120 mg Oral Daily     oxybutynin  5 mg Oral At Bedtime     insulin aspart  1-6 Units Subcutaneous TID w/meals       Data     Recent Labs  Lab 03/23/18  2105   WBC 8.1   HGB 13.3   MCV 90         POTASSIUM 3.8   CHLORIDE 107   CO2 23   BUN 25   CR 0.82   ANIONGAP 12   TERESA 8.9   *   ALBUMIN 3.8   PROTTOTAL 7.3   BILITOTAL 0.3   ALKPHOS 66   ALT 28   AST 19   LIPASE 124   TROPI 0.032       No results found for this or any previous visit (from the past 24 hour(s)).

## 2018-03-25 NOTE — PLAN OF CARE
Problem: Surgery Nonspecified (Adult)  Goal: Signs and Symptoms of Listed Potential Problems Will be Absent, Minimized or Managed (Surgery Nonspecified)  Signs and symptoms of listed potential problems will be absent, minimized or managed by discharge/transition of care (reference Surgery Nonspecified (Adult) CPG).  Outcome: Therapy, progress toward functional goals as expected  Patient very sleepy in the evening and into night, as she doesn't tolerate pain medications very well.  She will wake to voice.  She was able to get to commode x2 during night.  On 2L NC oxygen, maintained sats in 90's.  Bowl sounds hypoactive and she isnt passing gas yet.  Teaching patient to splint abd when coughing for pain control.  Tylenol x1 for pain.  Patient is wanting to go home today.

## 2018-03-25 NOTE — DISCHARGE SUMMARY
Physician Discharge Summary     Patient ID:  Mya Hui  [id number]  78 year old  1940    Admit date: 3/23/2018    Discharge date and time: 3/25/2018     Admitting Physician: Berry Allen DO     Discharge Physician: Alejandro    Admission Diagnoses: Acute cholecystitis [K81.0]    Discharge Diagnoses: Acute cholecystitis s/p lap cholecystectomy with post-op nausea    Admission Condition: poor    Discharged Condition: good    Indication for Admission: acute cholecystitis    Hospital Course: post-op nausea    Consults: general surgery    Significant Diagnostic Studies: US showing cholecystitis and cholelithiasis    Treatments: surgery: lap meghan    Discharge Exam:    General appearance: alert and cooperative  Heart: regular rate and rhythm, S1, S2 normal, no murmur, click, rub or gallop  Abdomen: soft, mild ramona-incisional tenderness; bowel sounds normal; no masses,  no organomegaly  Skin: Skin color, texture, turgor normal. No rashes or lesions    Disposition: home    Patient Instructions:   Current Discharge Medication List      START taking these medications    Details   lisinopril (PRINIVIL/ZESTRIL) 10 MG tablet Take 1 tablet (10 mg) by mouth daily  Qty: 30 tablet, Refills: 0    Associated Diagnoses: Essential hypertension with goal blood pressure less than 140/90      oxyCODONE-acetaminophen (PERCOCET) 5-325 MG per tablet Take 1 tablet by mouth every 4 hours as needed for severe pain maximum 6 tablet(s) per day  Qty: 20 tablet, Refills: 0    Associated Diagnoses: Acute cholecystitis         CONTINUE these medications which have NOT CHANGED    Details   CARTIA  MG 24 hr capsule TAKE ONE CAPSULE BY MOUTH ONCE DAILY  Qty: 90 capsule, Refills: 2    Associated Diagnoses: Essential hypertension with goal blood pressure less than 140/90      oxybutynin (DITROPAN) 5 MG tablet TAKE TWO TABLETS BY MOUTH TWICE A DAY AS NEEDED BLADDER SPASMS  Qty: 240 tablet, Refills: 5    Associated Diagnoses:  OAB (overactive bladder)      pantoprazole (PROTONIX) 40 MG EC tablet Take 1 tablet (40 mg) by mouth 2 times daily Take 30-60 minutes before a meal.  Qty: 180 tablet, Refills: 1    Associated Diagnoses: Gastroesophageal reflux disease, esophagitis presence not specified      potassium chloride (K-TAB,KLOR-CON) 10 MEQ tablet TAKE ONE TABLET BY MOUTH THREE TIMES A DAY  Qty: 90 tablet, Refills: 11    Associated Diagnoses: Hypokalemia      amitriptyline (ELAVIL) 25 MG tablet TAKE ONE TABLET BY MOUTH AT BEDTIME  Qty: 90 tablet, Refills: 2    Associated Diagnoses: Insomnia      lisinopril-hydrochlorothiazide (PRINZIDE/ZESTORETIC) 20-12.5 MG per tablet Take 1 tablet by mouth 2 times daily Do not restart until 9/12/17  Qty: 180 tablet, Refills: 1    Associated Diagnoses: Type 2 diabetes mellitus without complication, without long-term current use of insulin (H)      metFORMIN (GLUCOPHAGE-XR) 500 MG 24 hr tablet ONE TABLET BY MOUTH EVERY MORNING AND TAKE TWO TABLETS BY MOUTH EVERY EVENING WITH MEALS  Qty: 270 tablet, Refills: 2    Associated Diagnoses: Type 2 diabetes mellitus without complication, without long-term current use of insulin (H)      oxybutynin (DITROPAN-XL) 5 MG 24 hr tablet Take by mouth daily      acetaminophen (TYLENOL) 325 MG tablet Take 2 tablets (650 mg) by mouth every 4 hours as needed for mild pain  Qty: 100 tablet      pravastatin (PRAVACHOL) 40 MG tablet TAKE ONE TABLET BY MOUTH ONCE DAILY  Qty: 90 tablet, Refills: 3    Associated Diagnoses: Hyperlipidemia with target LDL less than 100      levothyroxine (SYNTHROID/LEVOTHROID) 50 MCG tablet Take 1 tablet (50 mcg) by mouth daily  Qty: 90 tablet, Refills: 2    Associated Diagnoses: Hypothyroidism, unspecified type      Multiple Vitamins-Minerals (MULTIVITAL PO) Take  by mouth. daily      blood glucose (NO BRAND SPECIFIED) lancets standard Glucose test strips  Use to test blood sugar 1 times daily or as directed.  Qty: 1 Box, Refills: 11    Associated  Diagnoses: Type 2 diabetes mellitus without complication, without long-term current use of insulin (H)      blood glucose monitoring (NO BRAND SPECIFIED) test strip Use to test blood sugars 1 times daily or as directed  Qty: 100 strip, Refills: 3    Associated Diagnoses: Type 2 diabetes mellitus without complication, without long-term current use of insulin (H)           Activity: activity as tolerated  Diet: regular diet  Wound Care: keep wound clean and dry and ice to area for comfort    Follow-up with me in 1 week.    Signed:  Berry Allen  3/25/2018  12:15 PM

## 2018-03-25 NOTE — PROGRESS NOTES
S-(situation): Patient discharged to daughter's home via wheelchair with daughter    B-(background): cholecystectomy    A-(assessment): patient reports pain controlled with Tylenol. Alert and oriented. Up with stand-by assist. Has had no nausea today, tolerating regular diet. On room air, vital signs stable.     R-(recommendations): Discharge instructions reviewed with patient and daughter. Listed belongings gathered and returned to patient. yes         Discharge Nursing Criteria:     Care Plan and Patient education resolved: Yes    New Medications- pt has been educated about purpose and side effects: Yes    Vaccines  Influenza status verified at discharge:  Yes    MISC  Prescriptions if needed, hard copies sent with patient  Yes  Home and hospital aquired medications returned to patient: NA  Medication Bin checked and emptied on discharge Yes  Patient reports post-discharge pain management plan is effective: Yes

## 2018-03-25 NOTE — CONSULTS
Care Transition Initial Assessment - RN  Reason For Consult: discharge planning   Met with: Patient and Family.    DATA   Active Problems:    Hyperlipidemia with target LDL less than 100    OAB (overactive bladder)    Essential hypertension with goal blood pressure less than 140/90    Hypothyroidism, unspecified type    Type 2 diabetes mellitus without complication, without long-term current use of insulin (H)    Gastroesophageal reflux disease, esophagitis presence not specified    Acute cholecystitis       [facility]  Primary Care MD Name: Omar  Contact information and PCP information verified: Yes      ASSESSMENT  Cognitive Status: awake, alert and oriented.             Lives With: alone  Living Arrangements: apartment  Quality Of Family Relationships: supportive, involved, helpful  Description of Support System: Supportive, Involved   Who is your support system?: Children   Support Assessment: Adequate family and caregiver support   Insurance Concerns: No Insurance issues identified          This writer met with pt and her daughter in the room, introduced self and role. Patient currently lives alone in her apartment gets her noon meal there and drives her car to her appointments and to run errands. Patient is going home with her daughter for a couple days until she is feeling stronger and then she will return to her apartment. Family is able to take patient to her follow up visits.      PLAN    Home with daughter for a few days, clinic follow up      Mya Wagner CTS RN [facility] [phone] [facility] [phone]    Discharge Planner   Discharge Plans in progress: Home with daughter  Barriers to discharge plan: none  Follow up plan: clinic follow up        Entered by: Mya Wagner 03/25/2018 11:19 AM

## 2018-03-28 NOTE — OP NOTE
Procedure Date: 03/24/2018      DATE OF SERVICE:  03/24/2018.        TIME:  12:20 p.m.      PROCEDURE:  Laparoscopic cholecystectomy.        PREOPERATIVE DIAGNOSIS:  Acute cholecystitis.      POSTOPERATIVE DIAGNOSIS:  Acute cholecystitis.      SURGEON:  Berry Allen DO      ASSISTANT:  None.      ANESTHESIA:  General endotracheal anesthesia.      ESTIMATED BLOOD LOSS:  Less than 10 mL.      SPECIMENS:  Gallbladder and contents.      COMPLICATIONS:  None immediately apparent.      INDICATIONS FOR PROCEDURE:  Mya is a 78-year-old female admitted to the hospital from the emergency room yesterday evening with complaints of right upper quadrant abdominal pain, flank pain.  Her emergency room evaluation revealed cholelithiasis with pericholecystic fluid and some gallbladder wall thickening consistent with acute cholecystitis.  On consultation I discussed these findings with the patient and recommended laparoscopic cholecystectomy, possible open for her acute cholecystitis.  After discussion of the risks, benefits and alternatives to the procedure we agreed on the stated procedure.      DESCRIPTION OF PROCEDURE:  After informed consent was obtained, the patient was brought from the preoperative holding to the operating room and placed in the supine position.  Anesthesia was induced.  She was prepped and draped in normal sterile fashion.  Timeout was performed.  After the correct patient and the correct procedure were verified we began by making a supraumbilical 5 mm incision.  A Veress needle was inserted into the peritoneal cavity, and the abdomen was insufflated to 15 mmHg.  A 5 mm trocar was inserted, camera was inserted.  General survey of the abdomen revealed no gross abnormalities.  The patient was placed in a head up rotated left position.  Two additional 5 mm trocars were placed in the right subcostal margin and an 11 mm trocar was placed in the subxiphoid under direct visualization as well.  The  gallbladder was grasped at the dome of the gallbladder and retracted laterally and superiorly using the other two working ports.  I removed the peritoneal lining from the Stanford's pouch.  The gallbladder was distended, large and could see cholelithiasis.  Using a [place] dissector was able to dissect around the cystic duct circumferentially.  I was then able to circumferentially dissect around the cystic artery.  Dissection was brought posteriorly towards the cystic plate.  Once I was able to easily identify 2 structures, namely the cystic artery and cystic duct going into the gallbladder I achieved the critical view of safety at that point.  Surgery continued with clipping the cystic duct distally twice and proximally once and transecting the cystic duct with EndoShears.  Cystic artery was clipped proximally and distally and transected with EndoShears as well.  The gallbladder was then removed from the liver bed using hook electrocautery.  Hemostasis was achieved during this removal.  Once the gallbladder had been completely removed from the liver bed, it was placed in an EndoCatch bag and brought through the 11 mm subxiphoid fascial defect.  The port was replaced and the amount of bleeding was irrigated and suctioned.  Hemostasis was verified by visualizing the liver bed.  Clips were intact.  No bile or blood staining in the operative field.  The 11 mm subxiphoid fascial defect was closed with 0 Vicryl suture using a PMI closure device.  The abdomen was desufflated and the ports removed under direct visualization.  Skin was anesthetized with 0.25% Marcaine with epinephrine for local anesthesia and the skin was closed with inverted interrupted 4-0 Monocryl sutures.  Dermabond dressing was applied.  The patient was awoken from anesthesia, brought to recovery room in stable condition.  At the completion of the case, all instruments, needles and sponges were accounted for with a correct count.         TANMAY IQBAL  DO KIN             D: 2018   T: 2018   MT: SANTANA      Name:     GLADYS MCINTYRE   MRN:      [id number]        Account:        [id number]   :      1940           Procedure Date: 2018      Document: [id number]

## 2018-03-30 LAB — COPATH REPORT: NORMAL

## 2018-04-03 ENCOUNTER — OFFICE VISIT (OUTPATIENT)
Dept: FAMILY MEDICINE | Facility: CLINIC | Age: 78
End: 2018-04-03
Payer: COMMERCIAL

## 2018-04-03 ENCOUNTER — HOSPITAL ENCOUNTER (OUTPATIENT)
Dept: MAMMOGRAPHY | Facility: CLINIC | Age: 78
Discharge: HOME | End: 2018-04-03
Attending: NURSE PRACTITIONER | Admitting: NURSE PRACTITIONER
Payer: MEDICARE

## 2018-04-03 VITALS
TEMPERATURE: 96 F | SYSTOLIC BLOOD PRESSURE: 148 MMHG | DIASTOLIC BLOOD PRESSURE: 80 MMHG | BODY MASS INDEX: 31.17 KG/M2 | OXYGEN SATURATION: 97 % | HEART RATE: 80 BPM | WEIGHT: 199 LBS

## 2018-04-03 DIAGNOSIS — N32.81 OAB (OVERACTIVE BLADDER): ICD-10-CM

## 2018-04-03 DIAGNOSIS — D18.01 CHERRY ANGIOMA: ICD-10-CM

## 2018-04-03 DIAGNOSIS — Q74.2 TOE ANOMALY: ICD-10-CM

## 2018-04-03 DIAGNOSIS — Z90.49 S/P LAPAROSCOPIC CHOLECYSTECTOMY: Primary | ICD-10-CM

## 2018-04-03 DIAGNOSIS — R19.4 FREQUENT BOWEL MOVEMENTS: ICD-10-CM

## 2018-04-03 DIAGNOSIS — E11.9 TYPE 2 DIABETES MELLITUS WITHOUT COMPLICATION, WITHOUT LONG-TERM CURRENT USE OF INSULIN (H): ICD-10-CM

## 2018-04-03 DIAGNOSIS — Z12.31 VISIT FOR SCREENING MAMMOGRAM: ICD-10-CM

## 2018-04-03 DIAGNOSIS — I10 ESSENTIAL HYPERTENSION WITH GOAL BLOOD PRESSURE LESS THAN 140/90: ICD-10-CM

## 2018-04-03 PROBLEM — K81.0 ACUTE CHOLECYSTITIS: Status: RESOLVED | Noted: 2018-03-23 | Resolved: 2018-04-03

## 2018-04-03 PROBLEM — R11.2 NAUSEA AND VOMITING IN ADULT: Status: RESOLVED | Noted: 2017-09-03 | Resolved: 2018-04-03

## 2018-04-03 PROBLEM — E86.0 DEHYDRATION: Status: RESOLVED | Noted: 2017-09-04 | Resolved: 2018-04-03

## 2018-04-03 PROBLEM — E87.6 HYPOKALEMIA: Status: RESOLVED | Noted: 2017-09-03 | Resolved: 2018-04-03

## 2018-04-03 PROBLEM — K59.00 CONSTIPATION: Status: RESOLVED | Noted: 2017-09-10 | Resolved: 2018-04-03

## 2018-04-03 PROBLEM — R05.9 COUGH: Status: RESOLVED | Noted: 2017-09-10 | Resolved: 2018-04-03

## 2018-04-03 PROBLEM — R79.89 ELEVATED LACTIC ACID LEVEL: Status: RESOLVED | Noted: 2017-09-10 | Resolved: 2018-04-03

## 2018-04-03 PROBLEM — N17.9 ACUTE KIDNEY INJURY (H): Status: RESOLVED | Noted: 2017-09-10 | Resolved: 2018-04-03

## 2018-04-03 LAB — HBA1C MFR BLD: 6.2 % (ref 0–6.4)

## 2018-04-03 PROCEDURE — 83036 HEMOGLOBIN GLYCOSYLATED A1C: CPT | Performed by: NURSE PRACTITIONER

## 2018-04-03 PROCEDURE — 99214 OFFICE O/P EST MOD 30 MIN: CPT | Performed by: NURSE PRACTITIONER

## 2018-04-03 PROCEDURE — 77063 BREAST TOMOSYNTHESIS BI: CPT

## 2018-04-03 PROCEDURE — 36415 COLL VENOUS BLD VENIPUNCTURE: CPT | Performed by: NURSE PRACTITIONER

## 2018-04-03 RX ORDER — METFORMIN HCL 500 MG
TABLET, EXTENDED RELEASE 24 HR ORAL
Qty: 270 TABLET | Refills: 2 | Status: SHIPPED | OUTPATIENT
Start: 2018-04-03 | End: 2018-12-13

## 2018-04-03 NOTE — PROGRESS NOTES
SUBJECTIVE:   Mya Hui is a 78 year old female who presents to clinic today for the following health issues:          Hospital Follow-up Visit:    Hospital/Nursing Home/IP Rehab Facility: [facility]  Date of Admission: 3/23/18  Date of Discharge: 3/25/18  Reason(s) for Admission:  Acute cholecystitis s/p lap cholecystectomy             Problems taking medications regularly:  None. Patient advised to discuss Lisinopril medication with you.        Medication changes since discharge: None       Problems adhering to non-medication therapy:  None    Summary of hospitalization:  [facility] discharge summary reviewed  Patient was hospitalized for cholecystectomy; surgical procedure was uneventful.  The patient was discharged home 3/25.  Diagnostic Tests/Treatments reviewed.  Follow up needed: none  Other Healthcare Providers Involved in Patient s Care:         None  Update since discharge: improved.  Still has some mild discomfort in the epigastrium, a little tenderness beneath the ribs at the surgical site.  She is eating, tolerating her diet well.  She reports having frequent bowel movements, typically within an hour after every time that she eats.    Post Discharge Medication Reconciliation: discharge medications reconciled, continue medications without change.  Plan of care communicated with patient     Coding guidelines for this visit:  Type of Medical   Decision Making Face-to-Face Visit       within 7 Days of discharge Face-to-Face Visit        within 14 days of discharge   Moderate Complexity [id number]   High Complexity [id number]              Mya notes multiple concerns:  1.  Her blood pressure medication has been changed.  She was taking lisinopril/HCTZ 20/12.5.  This was discontinued, she was discharged home on lisinopril 10 mg daily  2.  She requests a refill of her metformin  3.  She has noted numerous tiny red spots on her chest, abdomen, and medial upper arms.  4.  She  reports having loose stools, within an hour of every meal.  No cramping, does not pass any blood or mucus in her stools.  She has not noted this in regard to any particular type of food  5.  She noticed a small lump on the bottom of her left great toe.  It is asymptomatic, she is just uncertain as to what it is or how long it is been there  6.  She is taking oxybutynin for overactive bladder.  She does not use it during the daytime, takes 2 tablets at bedtime.  She does not have to get up during the night to go to the bathroom, has no incontinence during the night.  However, when she wakes up in the morning as she has great difficulty making it to the bathroom in time, and has had some incontinence    Problem list and histories reviewed & adjusted, as indicated.  Additional history: as documented    BP Readings from Last 3 Encounters:   04/03/18 148/80   03/25/18 151/79   01/04/18 130/84    Wt Readings from Last 3 Encounters:   04/03/18 199 lb (90.3 kg)   03/24/18 202 lb 13.2 oz (92 kg)   03/21/18 205 lb (93 kg)                  Labs reviewed in EPIC    Reviewed and updated as needed this visit by clinical staff  Tobacco  Allergies  Med Hx  Surg Hx  Fam Hx  Soc Hx      Reviewed and updated as needed this visit by Provider         ROS:  Constitutional, HEENT, cardiovascular, pulmonary, GI, , musculoskeletal, neuro, skin, endocrine and psych systems are negative, except as otherwise noted.    OBJECTIVE:     /80  Pulse 80  Temp 96  F (35.6  C) (Temporal)  Wt 199 lb (90.3 kg)  SpO2 97%  BMI 31.17 kg/m2  Body mass index is 31.17 kg/(m^2).   GENERAL: healthy, alert and no distress  NECK: no adenopathy, no asymmetry, masses, or scars and thyroid normal to palpation  RESP: lungs clear to auscultation - no rales, rhonchi or wheezes  CV: regular rate and rhythm, normal S1 S2, no S3 or S4, no murmur, click or rub, no peripheral edema and peripheral pulses strong  ABDOMEN: Soft, nondistended.  Bowel sounds  present in all quadrants.  Incisions are healing nicely without erythema or drainage, small amount of ecchymosis surrounding the 2 superior incision sites  MS: She ambulates with an antalgic gait due to osteoarthritic arthritis, especially in her hips.  Issue of concern today is regarding this lump she noted on the bottom of the left great toe.  At the base of the toe, on the plantar surface of the first phalanx is a very tiny palpable nodule.  It is nontender, does not seem movable.  Does not cause any radiating pain  SKIN: She is noted to have a few 1-2 mm cherry angiomas on the chest, abdomen, and medial upper arm  NEURO: Normal strength and tone, mentation intact and speech normal        ASSESSMENT/PLAN:     Problem List Items Addressed This Visit        Medium    OAB (overactive bladder)    Essential hypertension with goal blood pressure less than 140/90    Type 2 diabetes mellitus without complication, without long-term current use of insulin (H)    Relevant Medications    metFORMIN (GLUCOPHAGE-XR) 500 MG 24 hr tablet    Other Relevant Orders    Hemoglobin A1c (Completed)    S/P laparoscopic cholecystectomy - Primary    Toe anomaly    Cherry angioma      Other Visit Diagnoses     Frequent bowel movements               She is doing well postsurgically.  Tolerating diet.  She does experience a sense of fecal urgency about an hour after eating.  This may be related to the recent gallbladder issue.  At this time will just have her follow a bland diet, limit caffeine and sugary drinks.  Ensure adequate hydration.  She typically has a history of constipation.  We will follow-up if this is an ongoing issue    She is reassured the red spots on her skin are cherry angiomas and are benign    We discussed the lump beneath the toe, offered referral to podiatry.  Since it is not painful and is not affecting her gait in any way, she is just going to wait on this and will follow up with podiatry if it becomes concerning or  problematic    Diabetes is well controlled, continue current medications at present dose  Blood pressure is a little elevated today.  She is uncertain as to whether or not she took her blood pressure medication this morning.  There was a change in the hospital, they discontinued the lisinopril/HCTZ 20/12.5, and put her on lisinopril 10 mg.  She will  Check daily at home, contact clinic if it is elevated.  We may need to put her back on the higher dose of lisinopril initially, and then may consider adding the HCTZ if necessary    She is on oxybutynin at bedtime, it seems to work well for her symptoms other than when she first gets up in the morning.  Was offered a referral to urology, as it seems this is probably weakened muscle unable to maintain control of a full bladder in the morning.  Patient wants to wait on this for a while, will let me know if she wishes to see urology    She will be due for recheck of diabetes in 6 months, will follow-up in clinic regarding her blood pressure if it does not stay within goal    MAYI Anthony CNP  [facility]

## 2018-04-03 NOTE — MR AVS SNAPSHOT
After Visit Summary   4/3/2018    Mya Hui    MRN: [id number]           Patient Information     Date Of Birth          1940        Visit Information        Provider Department      4/3/2018 9:30 AM Chanell Nelson APRN CNP [facility]        Today's Diagnoses     S/P laparoscopic cholecystectomy    -  1    Essential hypertension with goal blood pressure less than 140/90        Type 2 diabetes mellitus without complication, without long-term current use of insulin (H)        OAB (overactive bladder)        Toe anomaly        Cherry angioma        Frequent bowel movements           Follow-ups after your visit        Your next 10 appointments already scheduled     Apr 06, 2018  9:30 AM CDT   [facility] Visit with Berry Allen,    [facility]    [address] [address] [phone]              Future tests that were ordered for you today     Open Future Orders        Priority Expected Expires Ordered    MA Screen Bilateral w/Gerald Routine  4/3/2019 4/3/2018            Who to contact     If you have questions or need follow up information about today's clinic visit or your schedule please contact [facility] directly at [phone].  Normal or non-critical lab and imaging results will be communicated to you by MyChart, letter or phone within 4 business days after the clinic has received the results. If you do not hear from us within 7 days, please contact the clinic through Collactivehart or phone. If you have a critical or abnormal lab result, we will notify you by phone as soon as possible.  Submit refill requests through Dragon Army or call your pharmacy and they will forward the refill request to us. Please allow 3 business days for your refill to be completed.          Additional Information About Your Visit        MyChart Information     Dragon Army lets you send messages to your doctor, view  "your test results, renew your prescriptions, schedule appointments and more. To sign up, go to [email or web address] . Click on \"Log in\" on the left side of the screen, which will take you to the Welcome page. Then click on \"Sign up Now\" on the right side of the page.     You will be asked to enter the access code listed below, as well as some personal information. Please follow the directions to create your username and password.     Your access code is: [email or web address]  Expires: 2018  3:29 PM     Your access code will  in 90 days. If you need help or a new code, please call your [facility] clinic or [phone].        Care EveryWhere ID     This is your Care EveryWhere ID. This could be used by other organizations to access your [facility] medical records  [id number]        Your Vitals Were     Pulse Temperature Pulse Oximetry BMI (Body Mass Index)          80 96  F (35.6  C) (Temporal) 97% 31.17 kg/m2         Blood Pressure from Last 3 Encounters:   18 148/80   18 151/79   18 130/84    Weight from Last 3 Encounters:   18 199 lb (90.3 kg)   18 202 lb 13.2 oz (92 kg)   18 205 lb (93 kg)              We Performed the Following     Hemoglobin A1c          Today's Medication Changes          These changes are accurate as of 4/3/18  2:04 PM.  If you have any questions, ask your nurse or doctor.               These medicines have changed or have updated prescriptions.        Dose/Directions    oxybutynin 5 MG tablet   Commonly known as:  DITROPAN   This may have changed:  Another medication with the same name was removed. Continue taking this medication, and follow the directions you see here.   Used for:  OAB (overactive bladder)   Changed by:  Chaenll Nelson APRN CNP        TAKE TWO TABLETS BY MOUTH TWICE A DAY AS NEEDED BLADDER SPASMS   Quantity:  240 tablet   Refills:  5            Where to get your medicines      These medications were sent to [id number] - [place], [place] - [address]  [address], [facility] [place] [facility] [address]    Hours:  [facility] [place] - numbers unchanged   9/8/03 kr Phone:  [phone]     metFORMIN 500 MG 24 hr tablet                Primary Care Provider Office Phone # Fax #    Chanell Nelson MAYI PONCHO [phone]       [address] [place] [address]        Equal Access to Services     WILDA RIBEIRO AH: Hadii aad ku hadasho Soomaali, waaxda luqadaha, qaybta kaalmada adeegyada, waxay idiin hayaan adeeg kharash la'aan ah. So [facility] [phone].    ATENCIÓN: Si habla español, tiene a pedro disposición servicios gratuitos de asistencia lingüística. Llame al [phone].    We comply with applicable federal civil rights laws and [place] laws. We do not discriminate on the basis of race, color, national origin, age, disability, sex, sexual orientation, or gender identity.            Thank you!     Thank you for choosing [facility]  for your care. Our goal is always to provide you with excellent care. Hearing back from our patients is one way we can continue to improve our services. Please take a few minutes to complete the written survey that you may receive in the mail after your visit with us. Thank you!             Your Updated Medication List - Protect others around you: Learn how to safely use, store and throw away your medicines at www.disposemymeds.org.          This list is accurate as of 4/3/18  2:04 PM.  Always use your most recent med list.                   Brand Name Dispense Instructions for use Diagnosis    acetaminophen 325 MG tablet    TYLENOL    100 tablet    Take 2 tablets (650 mg) by mouth every 4 hours as needed for mild pain        amitriptyline 25 MG tablet    ELAVIL    90 tablet    TAKE ONE TABLET BY MOUTH AT BEDTIME    Insomnia       blood glucose lancets standard    no brand specified    1 Box    Glucose test strips Use to test blood sugar 1 times daily or as directed.    Type 2 diabetes mellitus without  complication, without long-term current use of insulin (H)       blood glucose monitoring test strip    no brand specified    100 strip    Use to test blood sugars 1 times daily or as directed    Type 2 diabetes mellitus without complication, without long-term current use of insulin (H)       CARTIA  MG 24 hr capsule   Generic drug:  diltiazem     90 capsule    TAKE ONE CAPSULE BY MOUTH ONCE DAILY    Essential hypertension with goal blood pressure less than 140/90       levothyroxine 50 MCG tablet    SYNTHROID/LEVOTHROID    90 tablet    Take 1 tablet (50 mcg) by mouth daily    Hypothyroidism, unspecified type       lisinopril 10 MG tablet    PRINIVIL/ZESTRIL    30 tablet    Take 1 tablet (10 mg) by mouth daily    Essential hypertension with goal blood pressure less than 140/90       lisinopril-hydrochlorothiazide 20-12.5 MG per tablet    PRINZIDE/ZESTORETIC    180 tablet    Take 1 tablet by mouth 2 times daily Do not restart until 9/12/17    Type 2 diabetes mellitus without complication, without long-term current use of insulin (H)       metFORMIN 500 MG 24 hr tablet    GLUCOPHAGE-XR    270 tablet    ONE TABLET BY MOUTH EVERY MORNING AND TAKE TWO TABLETS BY MOUTH EVERY EVENING WITH MEALS    Type 2 diabetes mellitus without complication, without long-term current use of insulin (H)       MULTIVITAL PO      Take  by mouth. daily        oxybutynin 5 MG tablet    DITROPAN    240 tablet    TAKE TWO TABLETS BY MOUTH TWICE A DAY AS NEEDED BLADDER SPASMS    OAB (overactive bladder)       pantoprazole 40 MG EC tablet    PROTONIX    180 tablet    Take 1 tablet (40 mg) by mouth 2 times daily Take 30-60 minutes before a meal.    Gastroesophageal reflux disease, esophagitis presence not specified       potassium chloride 10 MEQ tablet    K-TAB,KLOR-CON    90 tablet    TAKE ONE TABLET BY MOUTH THREE TIMES A DAY    Hypokalemia       pravastatin 40 MG tablet    PRAVACHOL    90 tablet    TAKE ONE TABLET BY MOUTH ONCE DAILY     Hyperlipidemia with target LDL less than 100

## 2018-04-06 ENCOUNTER — OFFICE VISIT (OUTPATIENT)
Dept: SURGERY | Facility: CLINIC | Age: 78
End: 2018-04-06
Payer: COMMERCIAL

## 2018-04-06 VITALS
BODY MASS INDEX: 33.15 KG/M2 | HEIGHT: 65 IN | TEMPERATURE: 97.2 F | DIASTOLIC BLOOD PRESSURE: 84 MMHG | SYSTOLIC BLOOD PRESSURE: 124 MMHG | WEIGHT: 199 LBS

## 2018-04-06 DIAGNOSIS — Z90.49 S/P LAPAROSCOPIC CHOLECYSTECTOMY: Primary | ICD-10-CM

## 2018-04-06 PROCEDURE — 99024 POSTOP FOLLOW-UP VISIT: CPT | Performed by: SURGERY

## 2018-04-06 NOTE — PROGRESS NOTES
General Surgery Follow Up    Pt returns for follow up visit s/p laparoscopic cholecystectomy for acute meghan    HPI:  Mya is doing well. Tolerating a diet. Minimal pain. Some heartburn but this isn't a new finding. Having normal BMs.       Past Medical History:   Diagnosis Date     Acute cholecystitis 3/23/2018     Acute kidney injury (H) 9/10/2017     Arthritis      Dehydration 9/4/2017     Diabetes type 2, controlled (H)      Elevated lactic acid level 9/10/2017     GERD (gastroesophageal reflux disease)      History of renal calculi      HTN, goal below 140/90      Hyperlipidaemia LDL goal < 100      Hypokalemia 9/3/2017     Hypothyroid      Insomnia      Left carotid stenosis      Migraine      Sciatica of right side 6/28/2013       Past Surgical History:   Procedure Laterality Date     BUNIONECTOMY      Right foot     CATARACT IOL, RT/LT Bilateral 2017     COLONOSCOPY  7/12/2013    Procedure: COLONOSCOPY;  Colonoscopy;  Surgeon: Giovany Espinoza MD;  [facility]: [facility]     FRACTURE TX, ANKLE RT/LT      Left ankle     HC CYSTOURETHROSCOPY W/ URETEROSCOPY &/OR PYELOSCOPY; W/ LITHOTRIPSY       HC REVISE MEDIAN N/CARPAL TUNNEL SURG      Right wrist     INJECT JOINT SACROILIAC  4/10/2014    Procedure: INJECT JOINT SACROILIAC;  Sacroiliac Joint Injection;  Surgeon: Gilbert Mcclure MD;  Location[facility]     LAPAROSCOPIC CHOLECYSTECTOMY N/A 3/24/2018    Procedure: LAPAROSCOPIC CHOLECYSTECTOMY;  Laparoscopic Cholecystectomy;  Surgeon: Berry Allen DO;  Location: [facility]       Social History     Social History     Marital status:      Spouse name: N/A     Number of children: N/A     Years of education: N/A     Occupational History      Retired           [employer]'s office part time     Social History Main Topics     Smoking status: Never Smoker     Smokeless tobacco: Never Used     Alcohol use Yes     Drug use: No     Sexual activity: Not Currently     Other Topics Concern     Not on file     Social  "History Narrative       Current Outpatient Prescriptions   Medication Sig Dispense Refill     metFORMIN (GLUCOPHAGE-XR) 500 MG 24 hr tablet ONE TABLET BY MOUTH EVERY MORNING AND TAKE TWO TABLETS BY MOUTH EVERY EVENING WITH MEALS 270 tablet 2     lisinopril (PRINIVIL/ZESTRIL) 10 MG tablet Take 1 tablet (10 mg) by mouth daily 30 tablet 0     CARTIA  MG 24 hr capsule TAKE ONE CAPSULE BY MOUTH ONCE DAILY 90 capsule 2     oxybutynin (DITROPAN) 5 MG tablet TAKE TWO TABLETS BY MOUTH TWICE A DAY AS NEEDED BLADDER SPASMS 240 tablet 5     pantoprazole (PROTONIX) 40 MG EC tablet Take 1 tablet (40 mg) by mouth 2 times daily Take 30-60 minutes before a meal. 180 tablet 1     potassium chloride (K-TAB,KLOR-CON) 10 MEQ tablet TAKE ONE TABLET BY MOUTH THREE TIMES A DAY 90 tablet 11     amitriptyline (ELAVIL) 25 MG tablet TAKE ONE TABLET BY MOUTH AT BEDTIME 90 tablet 2     lisinopril-hydrochlorothiazide (PRINZIDE/ZESTORETIC) 20-12.5 MG per tablet Take 1 tablet by mouth 2 times daily Do not restart until 9/12/17 180 tablet 1     acetaminophen (TYLENOL) 325 MG tablet Take 2 tablets (650 mg) by mouth every 4 hours as needed for mild pain 100 tablet      pravastatin (PRAVACHOL) 40 MG tablet TAKE ONE TABLET BY MOUTH ONCE DAILY 90 tablet 3     levothyroxine (SYNTHROID/LEVOTHROID) 50 MCG tablet Take 1 tablet (50 mcg) by mouth daily 90 tablet 2     blood glucose (NO BRAND SPECIFIED) lancets standard Glucose test strips  Use to test blood sugar 1 times daily or as directed. 1 Box 11     blood glucose monitoring (NO BRAND SPECIFIED) test strip Use to test blood sugars 1 times daily or as directed 100 strip 3     Multiple Vitamins-Minerals (MULTIVITAL PO) Take  by mouth. daily         Medications and history reviewed    Physical exam:  Vitals: /84  Temp 97.2  F (36.2  C) (Temporal)  Ht 1.651 m (5' 5\")  Wt 90.3 kg (199 lb)  BMI 33.12 kg/m2  BMI= Body mass index is 33.12 kg/(m^2).    HEART: RRR, no new murmurs  LUNGS: CTAB, equal " chest rise, good effort  ABD: soft, non tender, non distended  INCISIONS: c/d/i dermabond removed, healing well  EXT: RENTERIA, no deformities    PATHOLOGY:  Chronic cholecystitis, cholelithiasis    Assessment:     ICD-10-CM    1. S/P laparoscopic cholecystectomy Z90.49      Plan: Mya is doing great, no concerns. All questions answered.    Berry Allen, DO

## 2018-04-06 NOTE — NURSING NOTE
"Chief Complaint   Patient presents with     Surgical Followup     Laparoscopic Cholecystectomy DOS 03/24/18       Initial /84  Temp 97.2  F (36.2  C) (Temporal)  Ht 1.651 m (5' 5\")  Wt 90.3 kg (199 lb)  BMI 33.12 kg/m2 Estimated body mass index is 33.12 kg/(m^2) as calculated from the following:    Height as of this encounter: 1.651 m (5' 5\").    Weight as of this encounter: 90.3 kg (199 lb).  Medication Reconciliation: tasneem MARQUIS CMA      "

## 2018-04-06 NOTE — MR AVS SNAPSHOT
"              After Visit Summary   2018    Mya Hui    MRN: [id number]           Patient Information     Date Of Birth          1940        Visit Information        Provider Department      2018 9:30 AM Berry Allen, DO [facility]        Today's Diagnoses     S/P laparoscopic cholecystectomy    -  1       Follow-ups after your visit        Who to contact     If you have questions or need follow up information about today's clinic visit or your schedule please contact [facility] directly at [phone].  Normal or non-critical lab and imaging results will be communicated to you by SeeSpacehart, letter or phone within 4 business days after the clinic has received the results. If you do not hear from us within 7 days, please contact the clinic through NaHeret or phone. If you have a critical or abnormal lab result, we will notify you by phone as soon as possible.  Submit refill requests through HelloTel or call your pharmacy and they will forward the refill request to us. Please allow 3 business days for your refill to be completed.          Additional Information About Your Visit        MyChart Information     HelloTel lets you send messages to your doctor, view your test results, renew your prescriptions, schedule appointments and more. To sign up, go to [email or web address] . Click on \"Log in\" on the left side of the screen, which will take you to the Welcome page. Then click on \"Sign up Now\" on the right side of the page.     You will be asked to enter the access code listed below, as well as some personal information. Please follow the directions to create your username and password.     Your access code is: [email or web address]  Expires: 2018  3:29 PM     Your access code will  in 90 days. If you need help or a new code, please call your [facility] or [phone].        Care EveryWhere ID     This is your Care EveryWhere ID. This could be " "used by other organizations to access your [facility] medical records  [id number]        Your Vitals Were     Temperature Height BMI (Body Mass Index)             97.2  F (36.2  C) (Temporal) 1.651 m (5' 5\") 33.12 kg/m2          Blood Pressure from Last 3 Encounters:   04/06/18 124/84   04/03/18 148/80   03/25/18 151/79    Weight from Last 3 Encounters:   04/06/18 90.3 kg (199 lb)   04/03/18 90.3 kg (199 lb)   03/24/18 92 kg (202 lb 13.2 oz)              Today, you had the following     No orders found for display       Primary Care Provider Office Phone # Fax #    Chanell Radha Nelson, APRN [facility] [phone] [address] [place] [address]        Equal Access to Services     [facility] Kennedy [facility] HospitalMARIA DEL CARMEN : Hadii aad ku hadasho Soomaali, waaxda luqadaha, qaybta kaalmada adeegyada, ct johnson . So [facility] [phone].    ATENCIÓN: Si habla español, tiene a pedro disposición servicios gratuitos de asistencia lingüística. Llame al [phone].    We comply with applicable federal civil rights laws and [place] laws. We do not discriminate on the basis of race, color, national origin, age, disability, sex, sexual orientation, or gender identity.            Thank you!     Thank you for choosing [facility]  for your care. Our goal is always to provide you with excellent care. Hearing back from our patients is one way we can continue to improve our services. Please take a few minutes to complete the written survey that you may receive in the mail after your visit with us. Thank you!             Your Updated Medication List - Protect others around you: Learn how to safely use, store and throw away your medicines at www.disposemymeds.org.          This list is accurate as of 4/6/18  9:40 AM.  Always use your most recent med list.                   Brand Name Dispense Instructions for use Diagnosis    acetaminophen 325 MG tablet    TYLENOL    100 tablet    Take 2 tablets (650 mg) " by mouth every 4 hours as needed for mild pain        amitriptyline 25 MG tablet    ELAVIL    90 tablet    TAKE ONE TABLET BY MOUTH AT BEDTIME    Insomnia       blood glucose lancets standard    no brand specified    1 Box    Glucose test strips Use to test blood sugar 1 times daily or as directed.    Type 2 diabetes mellitus without complication, without long-term current use of insulin (H)       blood glucose monitoring test strip    no brand specified    100 strip    Use to test blood sugars 1 times daily or as directed    Type 2 diabetes mellitus without complication, without long-term current use of insulin (H)       CARTIA  MG 24 hr capsule   Generic drug:  diltiazem     90 capsule    TAKE ONE CAPSULE BY MOUTH ONCE DAILY    Essential hypertension with goal blood pressure less than 140/90       levothyroxine 50 MCG tablet    SYNTHROID/LEVOTHROID    90 tablet    Take 1 tablet (50 mcg) by mouth daily    Hypothyroidism, unspecified type       lisinopril 10 MG tablet    PRINIVIL/ZESTRIL    30 tablet    Take 1 tablet (10 mg) by mouth daily    Essential hypertension with goal blood pressure less than 140/90       lisinopril-hydrochlorothiazide 20-12.5 MG per tablet    PRINZIDE/ZESTORETIC    180 tablet    Take 1 tablet by mouth 2 times daily Do not restart until 9/12/17    Type 2 diabetes mellitus without complication, without long-term current use of insulin (H)       metFORMIN 500 MG 24 hr tablet    GLUCOPHAGE-XR    270 tablet    ONE TABLET BY MOUTH EVERY MORNING AND TAKE TWO TABLETS BY MOUTH EVERY EVENING WITH MEALS    Type 2 diabetes mellitus without complication, without long-term current use of insulin (H)       MULTIVITAL PO      Take  by mouth. daily        oxybutynin 5 MG tablet    DITROPAN    240 tablet    TAKE TWO TABLETS BY MOUTH TWICE A DAY AS NEEDED BLADDER SPASMS    OAB (overactive bladder)       pantoprazole 40 MG EC tablet    PROTONIX    180 tablet    Take 1 tablet (40 mg) by mouth 2 times daily  Take 30-60 minutes before a meal.    Gastroesophageal reflux disease, esophagitis presence not specified       potassium chloride 10 MEQ tablet    K-TAB,KLOR-CON    90 tablet    TAKE ONE TABLET BY MOUTH THREE TIMES A DAY    Hypokalemia       pravastatin 40 MG tablet    PRAVACHOL    90 tablet    TAKE ONE TABLET BY MOUTH ONCE DAILY    Hyperlipidemia with target LDL less than 100

## 2018-04-06 NOTE — LETTER
4/6/2018         RE: Mya Hui  [address]  [place] [address]        Dear Colleague,    Thank you for referring your patient, Mya Hui, to the [facility]. Please see a copy of my visit note below.    General Surgery Follow Up    Pt returns for follow up visit s/p laparoscopic cholecystectomy for acute meghan    HPI:  Mya is doing well. Tolerating a diet. Minimal pain. Some heartburn but this isn't a new finding. Having normal BMs.       Past Medical History:   Diagnosis Date     Acute cholecystitis 3/23/2018     Acute kidney injury (H) 9/10/2017     Arthritis      Dehydration 9/4/2017     Diabetes type 2, controlled (H)      Elevated lactic acid level 9/10/2017     GERD (gastroesophageal reflux disease)      History of renal calculi      HTN, goal below 140/90      Hyperlipidaemia LDL goal < 100      Hypokalemia 9/3/2017     Hypothyroid      Insomnia      Left carotid stenosis      Migraine      Sciatica of right side 6/28/2013       Past Surgical History:   Procedure Laterality Date     BUNIONECTOMY      Right foot     CATARACT IOL, RT/LT Bilateral 2017     COLONOSCOPY  7/12/2013    Procedure: COLONOSCOPY;  Colonoscopy;  Surgeon: Giovany Espinoza MD;  [facility]     FRACTURE TX, ANKLE RT/LT      Left ankle     HC CYSTOURETHROSCOPY W/ URETEROSCOPY &/OR PYELOSCOPY; W/ LITHOTRIPSY       HC REVISE MEDIAN N/CARPAL TUNNEL SURG      Right wrist     INJECT JOINT SACROILIAC  4/10/2014    Procedure: INJECT JOINT SACROILIAC;  Sacroiliac Joint Injection;  Surgeon: Gilbert Mcclure MD;  [facility]     LAPAROSCOPIC CHOLECYSTECTOMY N/A 3/24/2018    Procedure: LAPAROSCOPIC CHOLECYSTECTOMY;  Laparoscopic Cholecystectomy;  Surgeon: Berry Allen DO;  Location[facility]       Social History     Social History     Marital status:      Spouse name: N/A     Number of children: N/A     Years of education: N/A     Occupational History      Retired            's office part time     Social History Main Topics     Smoking status: Never Smoker     Smokeless tobacco: Never Used     Alcohol use Yes     Drug use: No     Sexual activity: Not Currently     Other Topics Concern     Not on file     Social History Narrative       Current Outpatient Prescriptions   Medication Sig Dispense Refill     metFORMIN (GLUCOPHAGE-XR) 500 MG 24 hr tablet ONE TABLET BY MOUTH EVERY MORNING AND TAKE TWO TABLETS BY MOUTH EVERY EVENING WITH MEALS 270 tablet 2     lisinopril (PRINIVIL/ZESTRIL) 10 MG tablet Take 1 tablet (10 mg) by mouth daily 30 tablet 0     CARTIA  MG 24 hr capsule TAKE ONE CAPSULE BY MOUTH ONCE DAILY 90 capsule 2     oxybutynin (DITROPAN) 5 MG tablet TAKE TWO TABLETS BY MOUTH TWICE A DAY AS NEEDED BLADDER SPASMS 240 tablet 5     pantoprazole (PROTONIX) 40 MG EC tablet Take 1 tablet (40 mg) by mouth 2 times daily Take 30-60 minutes before a meal. 180 tablet 1     potassium chloride (K-TAB,KLOR-CON) 10 MEQ tablet TAKE ONE TABLET BY MOUTH THREE TIMES A DAY 90 tablet 11     amitriptyline (ELAVIL) 25 MG tablet TAKE ONE TABLET BY MOUTH AT BEDTIME 90 tablet 2     lisinopril-hydrochlorothiazide (PRINZIDE/ZESTORETIC) 20-12.5 MG per tablet Take 1 tablet by mouth 2 times daily Do not restart until 9/12/17 180 tablet 1     acetaminophen (TYLENOL) 325 MG tablet Take 2 tablets (650 mg) by mouth every 4 hours as needed for mild pain 100 tablet      pravastatin (PRAVACHOL) 40 MG tablet TAKE ONE TABLET BY MOUTH ONCE DAILY 90 tablet 3     levothyroxine (SYNTHROID/LEVOTHROID) 50 MCG tablet Take 1 tablet (50 mcg) by mouth daily 90 tablet 2     blood glucose (NO BRAND SPECIFIED) lancets standard Glucose test strips  Use to test blood sugar 1 times daily or as directed. 1 Box 11     blood glucose monitoring (NO BRAND SPECIFIED) test strip Use to test blood sugars 1 times daily or as directed 100 strip 3     Multiple Vitamins-Minerals (MULTIVITAL PO) Take  by mouth. daily    "      Medications and history reviewed    Physical exam:  Vitals: /84  Temp 97.2  F (36.2  C) (Temporal)  Ht 1.651 m (5' 5\")  Wt 90.3 kg (199 lb)  BMI 33.12 kg/m2  BMI= Body mass index is 33.12 kg/(m^2).    HEART: RRR, no new murmurs  LUNGS: CTAB, equal chest rise, good effort  ABD: soft, non tender, non distended  INCISIONS: c/d/i dermabond removed, healing well  EXT: RENTERIA, no deformities    PATHOLOGY:  Chronic cholecystitis, cholelithiasis    Assessment:     ICD-10-CM    1. S/P laparoscopic cholecystectomy Z90.49      Plan: Mya is doing great, no concerns. All questions answered.    Berry Allen, DO      Again, thank you for allowing me to participate in the care of your patient.        Sincerely,        Berry Allen, DO    "

## 2018-04-16 ENCOUNTER — TELEPHONE (OUTPATIENT)
Dept: FAMILY MEDICINE | Facility: CLINIC | Age: 78
End: 2018-04-16

## 2018-04-16 DIAGNOSIS — E87.6 HYPOKALEMIA: Primary | ICD-10-CM

## 2018-04-16 DIAGNOSIS — I10 ESSENTIAL HYPERTENSION WITH GOAL BLOOD PRESSURE LESS THAN 140/90: ICD-10-CM

## 2018-04-16 NOTE — TELEPHONE ENCOUNTER
Reason for Call:  Other call back    Detailed comments: Daughter April is requesting a callback to discuss medications (lisinopril (PRINIVIL/ZESTRIL) 10 MG tablet ), need some clarification after hospital stay.  April was advised PCP is not in clinic today     Phone Number Patient can be reached at: Other phone number:  [phone]*  APRIL    Best Time: before 9:30 or after 3:30pm , in between will be hit or miss for Tues    Can we leave a detailed message on this number? YES    Call taken on 4/16/2018 at 9:13 AM by Yeni Duarte

## 2018-04-17 PROBLEM — E87.6 HYPOKALEMIA: Status: ACTIVE | Noted: 2018-04-17

## 2018-04-17 RX ORDER — LISINOPRIL 10 MG/1
10 TABLET ORAL DAILY
Qty: 90 TABLET | Refills: 1 | Status: SHIPPED | OUTPATIENT
Start: 2018-04-17 | End: 2018-10-10 | Stop reason: DRUGHIGH

## 2018-04-17 NOTE — TELEPHONE ENCOUNTER
Call returned to patient and her daughter.  Her blood pressure has been remaining normal, typically in the 120s systolic, occasionally is 140 when the patient checks it.  We will have her continue the lisinopril 10 mg daily.  If it is running above goal, will need to increase it.  She is presently not on a diuretic, has had a tendency to hypokalemia and is taking potassium supplement 3 times daily.  I placed an order for potassium recheck, we will want to keep an eye on this since she is no longer on a diuretic.  Daughter will make an appointment to bring her in this week to have that checked

## 2018-04-20 DIAGNOSIS — E87.6 HYPOKALEMIA: ICD-10-CM

## 2018-04-20 LAB — POTASSIUM SERPL-SCNC: 4.1 MMOL/L (ref 3.4–5.3)

## 2018-04-20 PROCEDURE — 36415 COLL VENOUS BLD VENIPUNCTURE: CPT | Performed by: NURSE PRACTITIONER

## 2018-04-20 PROCEDURE — 84132 ASSAY OF SERUM POTASSIUM: CPT | Performed by: NURSE PRACTITIONER

## 2018-04-26 DIAGNOSIS — E03.9 HYPOTHYROIDISM, UNSPECIFIED TYPE: ICD-10-CM

## 2018-04-26 RX ORDER — LEVOTHYROXINE SODIUM 50 UG/1
TABLET ORAL
Qty: 90 TABLET | Refills: 2 | Status: SHIPPED | OUTPATIENT
Start: 2018-04-26 | End: 2018-10-10 | Stop reason: DRUGHIGH

## 2018-04-26 NOTE — TELEPHONE ENCOUNTER
"Requested Prescriptions   Pending Prescriptions Disp Refills     levothyroxine (SYNTHROID/LEVOTHROID) 50 MCG tablet [Pharmacy Med Name: LEVOTHYROXINE SODIUM 50MCG TABS] 90 tablet 2     Sig: TAKE ONE TABLET BY MOUTH ONCE DAILY    Thyroid Protocol Passed    4/26/2018 10:30 AM       Passed - Patient is 12 years or older       Passed - Recent (12 mo) or future (30 days) visit within the authorizing provider's specialty    Patient had office visit in the last 12 months or has a visit in the next 30 days with authorizing provider or within the authorizing provider's specialty.  See \"Patient Info\" tab in inbasket, or \"Choose Columns\" in Meds & Orders section of the refill encounter.           Passed - Normal TSH on file in past 12 months    Recent Labs   Lab Test  03/23/18   2105   TSH  2.23             Passed - No active pregnancy on record    If patient is pregnant or has had a positive pregnancy test, please check TSH.         Passed - No positive pregnancy test in past 12 months    If patient is pregnant or has had a positive pregnancy test, please check TSH.            Last Written Prescription Date:  6-16-17  Last Fill Quantity: 90,  # refills: 2   Last Office Visit with G, P or [facility] prescribing provider:  4/3/18   Future Office Visit:       "

## 2018-04-28 DIAGNOSIS — E11.9 TYPE 2 DIABETES MELLITUS WITHOUT COMPLICATION, WITHOUT LONG-TERM CURRENT USE OF INSULIN (H): ICD-10-CM

## 2018-04-30 NOTE — TELEPHONE ENCOUNTER
"Requested Prescriptions   Pending Prescriptions Disp Refills     ACCU-CHEK COMPACT PLUS test strip [Pharmacy Med Name: ACCU-CHEK COMPACT PLUS  STRP] 102 each 3     Sig: USE TO TEST BLOOD BLOOD SUGARS ONCE DAILY OR AS DIRECTED    Diabetic Supplies Protocol Passed    4/28/2018 11:49 AM       Passed - Patient is 18 years of age or older       Passed - Recent (6 mo) or future (30 days) visit within the authorizing provider's specialty    Patient had office visit in the last 6 months or has a visit in the next 30 days with authorizing provider.  See \"Patient Info\" tab in inbasket, or \"Choose Columns\" in Meds & Orders section of the refill encounter.            blood glucose monitoring (SOFTCLIX) lancets [Pharmacy Med Name: ACCU-CHEK SOFTCLIX LANCETS  MISC] 100 each 11     Sig: USE TO TEST BLOOD SUGARS ONCE DAILY OR AS DIRECTED    Diabetic Supplies Protocol Passed    4/28/2018 11:49 AM       Passed - Patient is 18 years of age or older       Passed - Recent (6 mo) or future (30 days) visit within the authorizing provider's specialty    Patient had office visit in the last 6 months or has a visit in the next 30 days with authorizing provider.  See \"Patient Info\" tab in inbasket, or \"Choose Columns\" in Meds & Orders section of the refill encounter.              Last Written Prescription Date:  2/16/17  Last Fill Quantity: 100,  # refills: 3   Last Office Visit with [facility], [facility] or [facility] prescribing provider:  4/13/18   Future Office Visit:     Lancets       Last Written Prescription Date:  2/16/17  Last Fill Quantity: 1 box,   # refills: 11  Last Office Visit: 4/13/18  Future Office visit:           "

## 2018-05-01 RX ORDER — LANCETS
EACH MISCELLANEOUS
Qty: 100 EACH | Refills: 11 | Status: SHIPPED | OUTPATIENT
Start: 2018-05-01 | End: 2020-04-27

## 2018-05-02 DIAGNOSIS — K21.9 GASTROESOPHAGEAL REFLUX DISEASE, ESOPHAGITIS PRESENCE NOT SPECIFIED: ICD-10-CM

## 2018-05-02 RX ORDER — PANTOPRAZOLE SODIUM 40 MG/1
40 TABLET, DELAYED RELEASE ORAL
Qty: 180 TABLET | Refills: 2 | Status: SHIPPED | OUTPATIENT
Start: 2018-05-02 | End: 2019-07-17

## 2018-05-02 NOTE — TELEPHONE ENCOUNTER
"Requested Prescriptions   Pending Prescriptions Disp Refills     pantoprazole (PROTONIX) 40 MG EC tablet 180 tablet 1     Sig: Take 1 tablet (40 mg) by mouth 2 times daily Take 30-60 minutes before a meal.    PPI Protocol Passed    5/2/2018  3:28 PM       Passed - Not on Clopidogrel (unless Pantoprazole ordered)       Passed - No diagnosis of osteoporosis on record       Passed - Recent (12 mo) or future (30 days) visit within the authorizing provider's specialty    Patient had office visit in the last 12 months or has a visit in the next 30 days with authorizing provider or within the authorizing provider's specialty.  See \"Patient Info\" tab in inbasket, or \"Choose Columns\" in Meds & Orders section of the refill encounter.           Passed - Patient is age 18 or older       Passed - No active pregnacy on record       Passed - No positive pregnancy test in past 12 months          Last Written Prescription Date:  1/30/18  Last Fill Quantity: 180,  # refills: 1   Last Office Visit with [facility], P or [facility] prescribing provider:  4/3/18   Future Office Visit:       "

## 2018-05-30 ENCOUNTER — TELEPHONE (OUTPATIENT)
Dept: FAMILY MEDICINE | Facility: CLINIC | Age: 78
End: 2018-05-30

## 2018-05-30 DIAGNOSIS — M15.0 PRIMARY OSTEOARTHRITIS INVOLVING MULTIPLE JOINTS: Primary | ICD-10-CM

## 2018-05-30 DIAGNOSIS — R26.89 BALANCE DISORDER: ICD-10-CM

## 2018-05-30 NOTE — TELEPHONE ENCOUNTER
Reason for Call:  Other call back    Detailed comments: She would like to request PT services for her mom, Mya. Before she does, she would like to discuss them with the provider.     Phone Number Patient can be reached at: [phone]    Best Time: anytime     Can we leave a detailed message on this number? YES    Call taken on 5/30/2018 at 1:06 PM by Marycarmen Griffiths

## 2018-05-30 NOTE — TELEPHONE ENCOUNTER
April, Mya's daughter calling asking for Chanell to ok a referral for Physical Therapy eval for at home. She states her mom has been having issues with balance and has fallen within the last year or so. Mya has been using a cane and that seems to help. April is looking for a referral for someone to come out to evaluate home for any special needs Mya would need to help with balance/fall issues. Patient is not home bound at this time. Will forward to provider for review. Casa/MA

## 2018-05-30 NOTE — TELEPHONE ENCOUNTER
MESFIN with daughter, April. Consent to comm on file for her. Please get more information as to why they think this is indicated. Patient may need to come in for appointment to discuss in detail. . ACase/MA

## 2018-05-30 NOTE — TELEPHONE ENCOUNTER
We can send home care out to do an evaluation and recommendation for physical therapy at home.  I am uncertain as to the Medicare reimbursement for this, but homecare is familiar with those regulations.  Will place a request for evaluation regarding balance and mobility/safety issues at home

## 2018-06-01 ENCOUNTER — TELEPHONE (OUTPATIENT)
Dept: FAMILY MEDICINE | Facility: CLINIC | Age: 78
End: 2018-06-01

## 2018-06-01 DIAGNOSIS — R26.89 BALANCE DISORDER: ICD-10-CM

## 2018-06-01 DIAGNOSIS — M15.0 PRIMARY OSTEOARTHRITIS INVOLVING MULTIPLE JOINTS: Primary | ICD-10-CM

## 2018-06-01 NOTE — TELEPHONE ENCOUNTER
Daughter is calling stating the orders were sent to the wrong place. She is wanting them sent to [facility] fax [phone] instead of [place].  Thank you,  Maite Chowdhury   for [facility]

## 2018-06-01 NOTE — TELEPHONE ENCOUNTER
Reason for Call: Request for an order or referral:    Order or referral being requested: for home care Physical therapy starting Tuesday 6/05/18    Date needed: as soon as possible    Has the patient been seen by the PCP for this problem? YES    Additional comments: Lynne calling from home care needs orders     Phone number Patient can be reached at:  Other phone number:  [phone]    Best Time:  any    Can we leave a detailed message on this number?  YES    Call taken on 6/1/2018 at 10:10 AM by Tiffany Garza

## 2018-06-15 ENCOUNTER — OFFICE VISIT (OUTPATIENT)
Dept: FAMILY MEDICINE | Facility: CLINIC | Age: 78
End: 2018-06-15
Payer: COMMERCIAL

## 2018-06-15 VITALS
DIASTOLIC BLOOD PRESSURE: 72 MMHG | TEMPERATURE: 98 F | OXYGEN SATURATION: 93 % | RESPIRATION RATE: 18 BRPM | WEIGHT: 191 LBS | BODY MASS INDEX: 31.78 KG/M2 | SYSTOLIC BLOOD PRESSURE: 116 MMHG | HEART RATE: 84 BPM

## 2018-06-15 DIAGNOSIS — R19.7 DIARRHEA, UNSPECIFIED TYPE: Primary | ICD-10-CM

## 2018-06-15 LAB
C COLI+JEJUNI+LARI FUSA STL QL NAA+PROBE: NOT DETECTED
C DIFF TOX B STL QL: NEGATIVE
EC STX1 GENE STL QL NAA+PROBE: NOT DETECTED
EC STX2 GENE STL QL NAA+PROBE: NOT DETECTED
ENTERIC PATHOGEN COMMENT: NORMAL
HEMOCCULT STL QL IA: NEGATIVE
NOROV GI+II ORF1-ORF2 JNC STL QL NAA+PR: NOT DETECTED
RVA NSP5 STL QL NAA+PROBE: NOT DETECTED
SALMONELLA SP RPOD STL QL NAA+PROBE: NOT DETECTED
SHIGELLA SP+EIEC IPAH STL QL NAA+PROBE: NOT DETECTED
SPECIMEN SOURCE: NORMAL
V CHOL+PARA RFBL+TRKH+TNAA STL QL NAA+PR: NOT DETECTED
Y ENTERO RECN STL QL NAA+PROBE: NOT DETECTED

## 2018-06-15 PROCEDURE — 99213 OFFICE O/P EST LOW 20 MIN: CPT | Performed by: NURSE PRACTITIONER

## 2018-06-15 PROCEDURE — 87338 HPYLORI STOOL AG IA: CPT | Performed by: NURSE PRACTITIONER

## 2018-06-15 PROCEDURE — 87209 SMEAR COMPLEX STAIN: CPT | Performed by: NURSE PRACTITIONER

## 2018-06-15 PROCEDURE — 87506 IADNA-DNA/RNA PROBE TQ 6-11: CPT | Performed by: NURSE PRACTITIONER

## 2018-06-15 PROCEDURE — 82274 ASSAY TEST FOR BLOOD FECAL: CPT | Performed by: NURSE PRACTITIONER

## 2018-06-15 PROCEDURE — 87493 C DIFF AMPLIFIED PROBE: CPT | Mod: 59 | Performed by: NURSE PRACTITIONER

## 2018-06-15 PROCEDURE — 87329 GIARDIA AG IA: CPT | Mod: 59 | Performed by: NURSE PRACTITIONER

## 2018-06-15 PROCEDURE — 87177 OVA AND PARASITES SMEARS: CPT | Performed by: NURSE PRACTITIONER

## 2018-06-15 ASSESSMENT — PAIN SCALES - GENERAL: PAINLEVEL: NO PAIN (0)

## 2018-06-15 NOTE — PROGRESS NOTES
SUBJECTIVE:   Mya Hui is a 78 year old female who presents to clinic today for the following health issues:      Diarrhea  Onset: march     Description:   Consistency of stool: watery, runny, loose and pasty  Blood in stool: no   Number of loose stools in past 24 hours: 1    Progression of Symptoms:  same    Accompanying Signs & Symptoms:  Fever: no   Nausea or vomiting; no   Abdominal pain: no   Episodes of constipation: no   Weight loss: YES    History:   Ill contacts: no   Recent use of antibiotics: no    Recent travels: no          Recent medication-new or changes(Rx or OTC): no     Precipitating factors:   After her surgery     Alleviating factors:   None     Therapies Tried and outcome:  Imodium AD and PeptoBismol; Outcome: doesn't work     The patient is seen in clinic today reporting ongoing diarrhea for a number of months.  She states anytime she eats or drinks anything other than water, she has to go to the bathroom within a half an hour, and then will typically go a second time within another half hour.  She denies abdominal cramping.  Denies passing blood or mucus in the stools.  She denies nausea or vomiting.  Imodium will work, but then she becomes constipated, after it wears off then she has a large diarrhea stool.  She had a completely normal colonoscopy 5 years ago    Problem list and histories reviewed & adjusted, as indicated.  Additional history: as documented    BP Readings from Last 3 Encounters:   06/15/18 116/72   04/06/18 124/84   04/03/18 148/80    Wt Readings from Last 3 Encounters:   06/15/18 191 lb (86.6 kg)   04/06/18 199 lb (90.3 kg)   04/03/18 199 lb (90.3 kg)                    Reviewed and updated as needed this visit by clinical staff       Reviewed and updated as needed this visit by Provider         ROS:  Constitutional, HEENT, cardiovascular, pulmonary, gi and gu systems are negative, except as otherwise noted.    OBJECTIVE:     /72  Pulse 84  Temp 98  F (36.7   C) (Temporal)  Resp 18  Wt 191 lb (86.6 kg)  SpO2 93%  BMI 31.78 kg/m2  Body mass index is 31.78 kg/(m^2).   GENERAL: healthy, alert and no distress  NECK: no adenopathy, no asymmetry, masses, or scars and thyroid normal to palpation  RESP: lungs clear to auscultation - no rales, rhonchi or wheezes  CV: regular rate and rhythm, normal S1 S2, no S3 or S4, no murmur, click or rub, no peripheral edema and peripheral pulses strong  ABDOMEN: soft, nontender, no hepatosplenomegaly, no masses and bowel sounds normal  MS: She has some difficulty with position change, and walks with an antalgic gait due to severe osteoarthritis of the knees  NEURO: Normal strength and tone, mentation intact and speech normal        ASSESSMENT/PLAN:     Problem List Items Addressed This Visit     None      Visit Diagnoses     Diarrhea, unspecified type    -  Primary    Relevant Orders    Clostridium difficile Toxin B PCR    Enteric Bacteria and Virus Panel by ROSALIA Stool    Giardia antigen    H Pylori antigen, stool    Occult blood fecal HGB immuno    Ova and Parasite Exam Routine           Stool tests ordered as above  Metamucil 1 tablespoon daily in water  Avoid fruit juices  Further follow-up pending results of stool studies    MAYI Anthony CNP  [facility]

## 2018-06-15 NOTE — NURSING NOTE
"Chief Complaint   Patient presents with     Diarrhea       Initial /72  Pulse 84  Temp 98  F (36.7  C) (Temporal)  Resp 18  Wt 191 lb (86.6 kg)  SpO2 93%  BMI 31.78 kg/m2 Estimated body mass index is 31.78 kg/(m^2) as calculated from the following:    Height as of 4/6/18: 5' 5\" (1.651 m).    Weight as of this encounter: 191 lb (86.6 kg).  BP completed using cuff size: thierno Ramon MA      "

## 2018-06-15 NOTE — MR AVS SNAPSHOT
After Visit Summary   6/15/2018    Mya Hui    MRN: [id number]           Patient Information     Date Of Birth          1940        Visit Information        Provider Department      6/15/2018 9:30 AM Chanell Nelson APRN CNP [facility]        Today's Diagnoses     Diarrhea, unspecified type    -  1       Follow-ups after your visit        Who to contact     If you have questions or need follow up information about today's clinic visit or your schedule please contact [facility] directly at [phone].  Normal or non-critical lab and imaging results will be communicated to you by MyChart, letter or phone within 4 business days after the clinic has received the results. If you do not hear from us within 7 days, please contact the clinic through MyChart or phone. If you have a critical or abnormal lab result, we will notify you by phone as soon as possible.  Submit refill requests through [employer] or call your pharmacy and they will forward the refill request to us. Please allow 3 business days for your refill to be completed.          Additional Information About Your Visit        Care EveryWhere ID     This is your Care EveryWhere ID. This could be used by other organizations to access your [place] medical records  [id number]        Your Vitals Were     Pulse Temperature Respirations Pulse Oximetry BMI (Body Mass Index)       84 98  F (36.7  C) (Temporal) 18 93% 31.78 kg/m2        Blood Pressure from Last 3 Encounters:   06/15/18 116/72   04/06/18 124/84   04/03/18 148/80    Weight from Last 3 Encounters:   06/15/18 191 lb (86.6 kg)   04/06/18 199 lb (90.3 kg)   04/03/18 199 lb (90.3 kg)              We Performed the Following     Clostridium difficile Toxin B PCR     Enteric Bacteria and Virus Panel by ROSALIA Stool     Giardia antigen     H Pylori antigen, stool     Occult blood fecal HGB immuno     Ova and Parasite Exam Routine        Primary Care  Provider Office Phone # Fax #    MAYI Anthony PONCHO [phone] [address] [place] [address]        Equal Access to Services     JANETTE RIBEIRO : Hadii aad ku hadbradyo Sorenaali, waaxda luqadaha, qaybta kaalmada adericharyada, ct ward beberichar muñoz elizabeth cuellar. So [facility] [phone].    ATENCIÓN: Si habla español, tiene a pedro disposición servicios gratuitos de asistencia lingüística. Llame al [phone].    We comply with applicable federal civil rights laws and [place] laws. We do not discriminate on the basis of race, color, national origin, age, disability, sex, sexual orientation, or gender identity.            Thank you!     Thank you for choosing [facility]  for your care. Our goal is always to provide you with excellent care. Hearing back from our patients is one way we can continue to improve our services. Please take a few minutes to complete the written survey that you may receive in the mail after your visit with us. Thank you!             Your Updated Medication List - Protect others around you: Learn how to safely use, store and throw away your medicines at www.disposemymeds.org.          This list is accurate as of 6/15/18  4:27 PM.  Always use your most recent med list.                   Brand Name Dispense Instructions for use Diagnosis    ACCU-CHEK COMPACT PLUS test strip   Generic drug:  blood glucose monitoring     102 each    USE TO TEST BLOOD BLOOD SUGARS ONCE DAILY OR AS DIRECTED    Type 2 diabetes mellitus without complication, without long-term current use of insulin (H)       acetaminophen 325 MG tablet    TYLENOL    100 tablet    Take 2 tablets (650 mg) by mouth every 4 hours as needed for mild pain        amitriptyline 25 MG tablet    ELAVIL    90 tablet    TAKE ONE TABLET BY MOUTH AT BEDTIME    Insomnia       blood glucose lancets standard    no brand specified    1 Box    Glucose test strips Use to test blood sugar 1 times daily or  as directed.    Type 2 diabetes mellitus without complication, without long-term current use of insulin (H)       blood glucose monitoring lancets     100 each    USE TO TEST BLOOD SUGARS ONCE DAILY OR AS DIRECTED    Type 2 diabetes mellitus without complication, without long-term current use of insulin (H)       CARTIA  MG 24 hr capsule   Generic drug:  diltiazem     90 capsule    TAKE ONE CAPSULE BY MOUTH ONCE DAILY    Essential hypertension with goal blood pressure less than 140/90       levothyroxine 50 MCG tablet    SYNTHROID/LEVOTHROID    90 tablet    TAKE ONE TABLET BY MOUTH ONCE DAILY    Hypothyroidism, unspecified type       lisinopril 10 MG tablet    PRINIVIL/ZESTRIL    90 tablet    Take 1 tablet (10 mg) by mouth daily    Essential hypertension with goal blood pressure less than 140/90       metFORMIN 500 MG 24 hr tablet    GLUCOPHAGE-XR    270 tablet    ONE TABLET BY MOUTH EVERY MORNING AND TAKE TWO TABLETS BY MOUTH EVERY EVENING WITH MEALS    Type 2 diabetes mellitus without complication, without long-term current use of insulin (H)       MULTIVITAL PO      Take  by mouth. daily        oxybutynin 5 MG tablet    DITROPAN    240 tablet    TAKE TWO TABLETS BY MOUTH TWICE A DAY AS NEEDED BLADDER SPASMS    OAB (overactive bladder)       pantoprazole 40 MG EC tablet    PROTONIX    180 tablet    Take 1 tablet (40 mg) by mouth 2 times daily Take 30-60 minutes before a meal.    Gastroesophageal reflux disease, esophagitis presence not specified       potassium chloride 10 MEQ tablet    K-TAB,KLOR-CON    90 tablet    TAKE ONE TABLET BY MOUTH THREE TIMES A DAY    Hypokalemia       pravastatin 40 MG tablet    PRAVACHOL    90 tablet    TAKE ONE TABLET BY MOUTH ONCE DAILY    Hyperlipidemia with target LDL less than 100

## 2018-06-18 LAB
G LAMBLIA AG STL QL IA: NORMAL
H PYLORI AG STL QL IA: NORMAL
SPECIMEN SOURCE: NORMAL
SPECIMEN SOURCE: NORMAL

## 2018-06-19 ENCOUNTER — TELEPHONE (OUTPATIENT)
Dept: FAMILY MEDICINE | Facility: CLINIC | Age: 78
End: 2018-06-19

## 2018-06-19 DIAGNOSIS — K52.9 CHRONIC DIARRHEA: Primary | ICD-10-CM

## 2018-06-19 LAB
O+P STL MICRO: NORMAL
O+P STL MICRO: NORMAL
SPECIMEN SOURCE: NORMAL

## 2018-06-19 NOTE — TELEPHONE ENCOUNTER
----- Message from MAYI Anthony CNP sent at 6/19/2018  9:33 AM CDT -----  Please inform the patient all stool studies are negative.  All lab work within the last year has been normal, with the exception of diabetes, which is well controlled.  Please make referral to gastroenterology for further evaluation of chronic diarrhea

## 2018-06-29 ENCOUNTER — OFFICE VISIT (OUTPATIENT)
Dept: ORTHOPEDICS | Facility: CLINIC | Age: 78
End: 2018-06-29
Payer: COMMERCIAL

## 2018-06-29 VITALS
BODY MASS INDEX: 32.82 KG/M2 | SYSTOLIC BLOOD PRESSURE: 126 MMHG | HEIGHT: 65 IN | DIASTOLIC BLOOD PRESSURE: 67 MMHG | WEIGHT: 197 LBS

## 2018-06-29 DIAGNOSIS — M12.811 ROTATOR CUFF ARTHROPATHY, RIGHT: Primary | ICD-10-CM

## 2018-06-29 PROCEDURE — 20610 DRAIN/INJ JOINT/BURSA W/O US: CPT | Mod: RT | Performed by: ORTHOPAEDIC SURGERY

## 2018-06-29 ASSESSMENT — PAIN SCALES - GENERAL: PAINLEVEL: MILD PAIN (2)

## 2018-06-29 NOTE — MR AVS SNAPSHOT
"              After Visit Summary   6/29/2018    Mya Hui    MRN: [id number]           Patient Information     Date Of Birth          1940        Visit Information        Provider Department      6/29/2018 11:10 AM Annamaria Carpio MD [facility]        Today's Diagnoses     Rotator cuff arthropathy, right    -  1       Follow-ups after your visit        Who to contact     If you have questions or need follow up information about today's clinic visit or your schedule please contact [facility] directly at [phone].  Normal or non-critical lab and imaging results will be communicated to you by MyChart, letter or phone within 4 business days after the clinic has received the results. If you do not hear from us within 7 days, please contact the clinic through MyChart or phone. If you have a critical or abnormal lab result, we will notify you by phone as soon as possible.  Submit refill requests through [employer] or call your pharmacy and they will forward the refill request to us. Please allow 3 business days for your refill to be completed.          Additional Information About Your Visit        Care EveryWhere ID     This is your Care EveryWhere ID. This could be used by other organizations to access your [place] medical records  [id number]        Your Vitals Were     Height BMI (Body Mass Index)                1.651 m (5' 5\") 32.78 kg/m2           Blood Pressure from Last 3 Encounters:   06/29/18 126/67   06/15/18 116/72   04/06/18 124/84    Weight from Last 3 Encounters:   06/29/18 89.4 kg (197 lb)   06/15/18 86.6 kg (191 lb)   04/06/18 90.3 kg (199 lb)              We Performed the Following     Kenalog 40 MG  []     Large Joint/Bursa injection and/or drainage (Shoulder, Knee)        Primary Care Provider Office Phone # Fax #    MAYI Anthony [facility] [phone]       [address] [place] [address]        Equal Access to Services  "    JANETTE RIBEIRO : Hadii aad ku tyrone Somahi, waaxda luqadaha, qaybta kaalmada adebandar, ct carla hayramon kabasahrarosemarie johnson . So [facility] [phone].    ATENCIÓN: Si habla español, tiene a pedro disposición servicios gratuitos de asistencia lingüística. Llame al [phone].    We comply with applicable federal civil rights laws and [place] laws. We do not discriminate on the basis of race, color, national origin, age, disability, sex, sexual orientation, or gender identity.            Thank you!     Thank you for choosing [facility]  for your care. Our goal is always to provide you with excellent care. Hearing back from our patients is one way we can continue to improve our services. Please take a few minutes to complete the written survey that you may receive in the mail after your visit with us. Thank you!             Your Updated Medication List - Protect others around you: Learn how to safely use, store and throw away your medicines at www.disposemymeds.org.          This list is accurate as of 6/29/18  3:28 PM.  Always use your most recent med list.                   Brand Name Dispense Instructions for use Diagnosis    ACCU-CHEK COMPACT PLUS test strip   Generic drug:  blood glucose monitoring     102 each    USE TO TEST BLOOD BLOOD SUGARS ONCE DAILY OR AS DIRECTED    Type 2 diabetes mellitus without complication, without long-term current use of insulin (H)       acetaminophen 325 MG tablet    TYLENOL    100 tablet    Take 2 tablets (650 mg) by mouth every 4 hours as needed for mild pain        amitriptyline 25 MG tablet    ELAVIL    90 tablet    TAKE ONE TABLET BY MOUTH AT BEDTIME    Insomnia       blood glucose lancets standard    no brand specified    1 Box    Glucose test strips Use to test blood sugar 1 times daily or as directed.    Type 2 diabetes mellitus without complication, without long-term current use of insulin (H)       blood glucose monitoring lancets     100 each     USE TO TEST BLOOD SUGARS ONCE DAILY OR AS DIRECTED    Type 2 diabetes mellitus without complication, without long-term current use of insulin (H)       CARTIA  MG 24 hr capsule   Generic drug:  diltiazem     90 capsule    TAKE ONE CAPSULE BY MOUTH ONCE DAILY    Essential hypertension with goal blood pressure less than 140/90       levothyroxine 50 MCG tablet    SYNTHROID/LEVOTHROID    90 tablet    TAKE ONE TABLET BY MOUTH ONCE DAILY    Hypothyroidism, unspecified type       lisinopril 10 MG tablet    PRINIVIL/ZESTRIL    90 tablet    Take 1 tablet (10 mg) by mouth daily    Essential hypertension with goal blood pressure less than 140/90       metFORMIN 500 MG 24 hr tablet    GLUCOPHAGE-XR    270 tablet    ONE TABLET BY MOUTH EVERY MORNING AND TAKE TWO TABLETS BY MOUTH EVERY EVENING WITH MEALS    Type 2 diabetes mellitus without complication, without long-term current use of insulin (H)       MULTIVITAL PO      Take  by mouth. daily        oxybutynin 5 MG tablet    DITROPAN    240 tablet    TAKE TWO TABLETS BY MOUTH TWICE A DAY AS NEEDED BLADDER SPASMS    OAB (overactive bladder)       pantoprazole 40 MG EC tablet    PROTONIX    180 tablet    Take 1 tablet (40 mg) by mouth 2 times daily Take 30-60 minutes before a meal.    Gastroesophageal reflux disease, esophagitis presence not specified       potassium chloride 10 MEQ tablet    K-TAB,KLOR-CON    90 tablet    TAKE ONE TABLET BY MOUTH THREE TIMES A DAY    Hypokalemia       pravastatin 40 MG tablet    PRAVACHOL    90 tablet    TAKE ONE TABLET BY MOUTH ONCE DAILY    Hyperlipidemia with target LDL less than 100
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
## 2018-08-08 ENCOUNTER — OFFICE VISIT (OUTPATIENT)
Dept: FAMILY MEDICINE | Facility: CLINIC | Age: 78
End: 2018-08-08
Payer: COMMERCIAL

## 2018-08-08 VITALS
SYSTOLIC BLOOD PRESSURE: 150 MMHG | BODY MASS INDEX: 32.95 KG/M2 | HEART RATE: 84 BPM | DIASTOLIC BLOOD PRESSURE: 92 MMHG | TEMPERATURE: 97.7 F | OXYGEN SATURATION: 98 % | WEIGHT: 198 LBS

## 2018-08-08 DIAGNOSIS — R68.2 DRY MOUTH: ICD-10-CM

## 2018-08-08 DIAGNOSIS — M25.551 ACUTE RIGHT HIP PAIN: Primary | ICD-10-CM

## 2018-08-08 DIAGNOSIS — R29.6 RECURRENT FALLS: ICD-10-CM

## 2018-08-08 PROCEDURE — 99214 OFFICE O/P EST MOD 30 MIN: CPT | Performed by: FAMILY MEDICINE

## 2018-08-08 ASSESSMENT — PAIN SCALES - GENERAL: PAINLEVEL: NO PAIN (1)

## 2018-08-08 NOTE — MR AVS SNAPSHOT
After Visit Summary   8/8/2018    Mya Hui    MRN: [id number]           Patient Information     Date Of Birth          1940        Visit Information        Provider Department      8/8/2018 11:00 AM Anibal Deal MD [facility]        Today's Diagnoses     Type 2 diabetes mellitus without complication, without long-term current use of insulin (H)    -  1    Hyperlipidemia with target LDL less than 100           Follow-ups after your visit        Your next 10 appointments already scheduled     Sep 26, 2018 11:00 AM CDT   SHORT with MAYI Anthony [facility] ([facility])    [address] [id number] [phone]            Dec 21, 2018  3:00 PM CST   [facility] Visit with Jimmy Martinez MD   [facility]    [address] [place] [id number] [phone]              Who to contact     If you have questions or need follow up information about today's clinic visit or your schedule please contact [facility] directly at [phone].  Normal or non-critical lab and imaging results will be communicated to you by MyChart, letter or phone within 4 business days after the clinic has received the results. If you do not hear from us within 7 days, please contact the clinic through MyChart or phone. If you have a critical or abnormal lab result, we will notify you by phone as soon as possible.  Submit refill requests through [employer] or call your pharmacy and they will forward the refill request to us. Please allow 3 business days for your refill to be completed.          Additional Information About Your Visit        Care EveryWhere ID     This is your Care EveryWhere ID. This could be used by other organizations to access your [facility] medical records  [id number]        Your Vitals Were     Pulse Temperature Pulse Oximetry BMI  (Body Mass Index)          84 97.7  F (36.5  C) (Temporal) 98% 32.95 kg/m2         Blood Pressure from Last 3 Encounters:   08/08/18 (!) 150/92   06/29/18 126/67   06/15/18 116/72    Weight from Last 3 Encounters:   08/08/18 198 lb (89.8 kg)   06/29/18 197 lb (89.4 kg)   06/15/18 191 lb (86.6 kg)              Today, you had the following     No orders found for display       Primary Care Provider Office Phone # Fax #    Chanell Nelson, APRN [phone] [address] [place] [address]        Equal Access to Services     JANETTE RIBEIRO : Janelle Call, wapaige granger, qaybta kaalmada adericharyaedith, ct johnson . So [facility] [phone].    ATENCIÓN: Si habla español, tiene a pedro disposición servicios gratuitos de asistencia lingüística. Llame al [phone].    We comply with applicable federal civil rights laws and [place] laws. We do not discriminate on the basis of race, color, national origin, age, disability, sex, sexual orientation, or gender identity.            Thank you!     Thank you for choosing [facility]  for your care. Our goal is always to provide you with excellent care. Hearing back from our patients is one way we can continue to improve our services. Please take a few minutes to complete the written survey that you may receive in the mail after your visit with us. Thank you!             Your Updated Medication List - Protect others around you: Learn how to safely use, store and throw away your medicines at www.disposemymeds.org.          This list is accurate as of 8/8/18 11:45 AM.  Always use your most recent med list.                   Brand Name Dispense Instructions for use Diagnosis    ACCU-CHEK COMPACT PLUS test strip   Generic drug:  blood glucose monitoring     102 each    USE TO TEST BLOOD BLOOD SUGARS ONCE DAILY OR AS DIRECTED    Type 2 diabetes mellitus without complication, without long-term current use  of insulin (H)       acetaminophen 325 MG tablet    TYLENOL    100 tablet    Take 2 tablets (650 mg) by mouth every 4 hours as needed for mild pain        amitriptyline 25 MG tablet    ELAVIL    90 tablet    TAKE ONE TABLET BY MOUTH AT BEDTIME    Insomnia       blood glucose lancets standard    no brand specified    1 Box    Glucose test strips Use to test blood sugar 1 times daily or as directed.    Type 2 diabetes mellitus without complication, without long-term current use of insulin (H)       blood glucose monitoring lancets     100 each    USE TO TEST BLOOD SUGARS ONCE DAILY OR AS DIRECTED    Type 2 diabetes mellitus without complication, without long-term current use of insulin (H)       CARTIA  MG 24 hr capsule   Generic drug:  diltiazem     90 capsule    TAKE ONE CAPSULE BY MOUTH ONCE DAILY    Essential hypertension with goal blood pressure less than 140/90       levothyroxine 50 MCG tablet    SYNTHROID/LEVOTHROID    90 tablet    TAKE ONE TABLET BY MOUTH ONCE DAILY    Hypothyroidism, unspecified type       lisinopril 10 MG tablet    PRINIVIL/ZESTRIL    90 tablet    Take 1 tablet (10 mg) by mouth daily    Essential hypertension with goal blood pressure less than 140/90       metFORMIN 500 MG 24 hr tablet    GLUCOPHAGE-XR    270 tablet    ONE TABLET BY MOUTH EVERY MORNING AND TAKE TWO TABLETS BY MOUTH EVERY EVENING WITH MEALS    Type 2 diabetes mellitus without complication, without long-term current use of insulin (H)       MULTIVITAL PO      Take  by mouth. daily        oxybutynin 5 MG tablet    DITROPAN    240 tablet    TAKE TWO TABLETS BY MOUTH TWICE A DAY AS NEEDED BLADDER SPASMS    OAB (overactive bladder)       pantoprazole 40 MG EC tablet    PROTONIX    180 tablet    Take 1 tablet (40 mg) by mouth 2 times daily Take 30-60 minutes before a meal.    Gastroesophageal reflux disease, esophagitis presence not specified       potassium chloride 10 MEQ tablet    K-TAB,KLOR-CON    90 tablet    TAKE ONE  TABLET BY MOUTH THREE TIMES A DAY    Hypokalemia       pravastatin 40 MG tablet    PRAVACHOL    90 tablet    TAKE ONE TABLET BY MOUTH ONCE DAILY    Hyperlipidemia with target LDL less than 100

## 2018-08-08 NOTE — PROGRESS NOTES
SUBJECTIVE:                                                      Chief Complaint   Patient presents with     Musculoskeletal Problem        Location:right hip  Duration of pain-06/20/2018-approximate  Intensity- Currently 1/10, Worst 10/10  Radiating- into the right leg and lower back  What relives the pain-PT, ibuprofen, tylenol, hot packs, ice packs-help for short amounts of time.   Cause-fall around 06/20/2018        Mya is a 78 year old comes in with daughter.  Daughter is here today because they note worsening memory issues.  Patient recalls falling about 6-8 weeks ago.  She had a lot of pain on the outside portion of her right hip.  She was able to immediately bear weight.  She does have a history of osteopenia on a DEXA scan remotely.  I do not have those records to review.  She then went through physical therapy and things seemed to improve.Arrange physical therapy at home.  Daughter is a speech therapist and felt comfortable with that.  Pain is not completely improved so they thought they would follow-up.  They do note 5 falls in the last year or so.  She seems to have some instability as well as weakness in her legs.  She complains of fatiguing easily in her legs.  Also complains of dry mouth.      ROS:  Constitutional, HEENT, cardiovascular, pulmonary, gi and gu systems are negative, except as otherwise noted.    OBJECTIVE:                                                    BP (!) 150/92 (BP Location: Right arm, Patient Position: Chair, Cuff Size: Adult Regular)  Pulse 84  Temp 97.7  F (36.5  C) (Temporal)  Wt 198 lb (89.8 kg)  SpO2 98%  BMI 32.95 kg/m2  Body mass index is 32.95 kg/(m^2).    Well-appearing.  Daughter gives most of the history.  Patient's memory is spotty.  She has some tenderness laterally over the IT band and greater trochanteric bursa.  Normal internal/external rotation of the hip.  Without pain.  She is weightbearing and her gait is normal.  Oromucosa is dry.  She is up out  of the chair with assistance in just a few seconds.  She uses a cane for assistance.  Has reasonable gait stability.         ASSESSMENT/PLAN:                                                        ICD-10-CM    1. Acute right hip pain M25.551    2. Recurrent falls R29.6    3. Dry mouth R68.2        Contusion injury of the right hip in this patient with worsening gait instability, subjective complaints of weakness, recurrent falls..  Continue her weightbearing as tolerated and activity modification, icing, Tylenol.  I gave the mainly reassurance.  I think a dry mouth is caused by combination of amitriptyline and oxybutynin.  She will discuss with her primary which one she may elect to come off of.  She does complain of a lot of trouble with urinary frequency off of the oxybutynin.  These agents are also known to cause instability and falls in the elderly.  She will discuss again with her primary what might be some reasonable medication adjustments.  Follow-up with her primary in terms of other chronic disease issues.    Anibal Deal MD  [facility]

## 2018-08-08 NOTE — NURSING NOTE
Health Maintenance Due   Topic Date Due     JANNY QUESTIONNAIRE 1 YEAR  02/28/1958     PHQ-9 Q1YR  02/28/1958     DEXA Q2 YR  12/05/2015     ADVANCE DIRECTIVE PLANNING Q5 YRS  04/04/2018     FALL RISK ASSESSMENT  06/16/2018     FOOT EXAM Q1 YEAR  08/10/2018     LIPID MONITORING Q1 YEAR  08/10/2018     MICROALBUMIN Q1 YEAR  08/10/2018       Health Maintenance reviewed at today's visit patient asked to schedule/complete:   Patient is aware.    Luna Luna, CMA

## 2018-08-30 DIAGNOSIS — E78.5 HYPERLIPIDEMIA WITH TARGET LDL LESS THAN 100: ICD-10-CM

## 2018-08-30 NOTE — TELEPHONE ENCOUNTER
"Requested Prescriptions   Pending Prescriptions Disp Refills     pravastatin (PRAVACHOL) 40 MG tablet [Pharmacy Med Name: PRAVASTATIN SODIUM 40MG TABS] 90 tablet 3    Last Written Prescription Date:  8/28/17  Last Fill Quantity: 90,  # refills: 3   Last office visit: 8/8/2018 with prescribing provider:  8/8/18   Future Office Visit:   Next 5 appointments (look out 90 days)     Sep 26, 2018 11:00 AM CDT   SHORT with MAYI Anthony CNP   [facility] ([address] [id number] [phone]                  Sig: TAKE ONE TABLET BY MOUTH ONCE DAILY    Statins Protocol Failed    8/30/2018  4:27 PM       Failed - LDL on file in past 12 months    Recent Labs   Lab Test  08/10/17   1009   LDL  64            Passed - No abnormal creatine kinase in past 12 months    No lab results found.            Passed - Recent (12 mo) or future (30 days) visit within the authorizing provider's specialty    Patient had office visit in the last 12 months or has a visit in the next 30 days with authorizing provider or within the authorizing provider's specialty.  See \"Patient Info\" tab in inbasket, or \"Choose Columns\" in Meds & Orders section of the refill encounter.           Passed - Patient is age 18 or older       Passed - No active pregnancy on record       Passed - No positive pregnancy test in past 12 months          "

## 2018-09-04 RX ORDER — PRAVASTATIN SODIUM 40 MG
40 TABLET ORAL DAILY
Qty: 30 TABLET | Refills: 0 | Status: SHIPPED | OUTPATIENT
Start: 2018-09-04 | End: 2018-10-01

## 2018-09-05 ENCOUNTER — APPOINTMENT (OUTPATIENT)
Dept: GENERAL RADIOLOGY | Facility: CLINIC | Age: 78
End: 2018-09-05
Attending: EMERGENCY MEDICINE
Payer: MEDICARE

## 2018-09-05 ENCOUNTER — TELEPHONE (OUTPATIENT)
Dept: FAMILY MEDICINE | Facility: CLINIC | Age: 78
End: 2018-09-05

## 2018-09-05 ENCOUNTER — HOSPITAL ENCOUNTER (EMERGENCY)
Facility: CLINIC | Age: 78
Discharge: HOME | End: 2018-09-05
Attending: EMERGENCY MEDICINE | Admitting: EMERGENCY MEDICINE
Payer: MEDICARE

## 2018-09-05 VITALS
BODY MASS INDEX: 31.62 KG/M2 | HEART RATE: 70 BPM | SYSTOLIC BLOOD PRESSURE: 148 MMHG | RESPIRATION RATE: 20 BRPM | OXYGEN SATURATION: 98 % | TEMPERATURE: 98 F | DIASTOLIC BLOOD PRESSURE: 91 MMHG | WEIGHT: 190 LBS
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
## 2018-09-05 DIAGNOSIS — T14.8XXA BONE BRUISE: ICD-10-CM

## 2018-09-05 PROCEDURE — 99283 EMERGENCY DEPT VISIT LOW MDM: CPT | Mod: Z6 | Performed by: EMERGENCY MEDICINE

## 2018-09-05 PROCEDURE — 73502 X-RAY EXAM HIP UNI 2-3 VIEWS: CPT | Mod: TC

## 2018-09-05 PROCEDURE — 99283 EMERGENCY DEPT VISIT LOW MDM: CPT | Performed by: EMERGENCY MEDICINE

## 2018-09-05 NOTE — ED TRIAGE NOTES
Pt c/o left leg and back pain after a fall on 8-31-18.  States the pain is getting worse, and having difficulty moving her left leg.  Denies loss of bowl or bladder, no N/T.

## 2018-09-05 NOTE — ED AVS SNAPSHOT
[facility]    [address] [place] [address]    Phone:  [phone]    Fax:  [phone]                                       Mya Hui   MRN: [id number]    Department:  [facility]   Date of Visit:  9/5/2018           After Visit Summary Signature Page     I have received my discharge instructions, and my questions have been answered. I have discussed any challenges I see with this plan with the nurse or doctor.    ..........................................................................................................................................  Patient/Patient Representative Signature      ..........................................................................................................................................  Patient Representative Print Name and Relationship to Patient    ..................................................               ................................................  Date                                            Time    ..........................................................................................................................................  Reviewed by Signature/Title    ...................................................              ..............................................  Date                                                            Time          22EPIC Rev 08/18

## 2018-09-05 NOTE — ED AVS SNAPSHOT
[facility] Emergency Department    [address] [place] [address]    Phone:  [phone]    Fax:  [phone]                                       Mya Hui   MRN: [id number]    Department:  [facility] Emergency Department   Date of Visit:  9/5/2018           Patient Information     Date Of Birth          1940        Your diagnoses for this visit were:     Bone bruise     Fall, initial encounter        You were seen by Reuben Lopez MD.      Follow-up Information     Follow up with Chanell Nelson APRN CNP. Schedule an appointment as soon as possible for a visit in 3 days.    Specialty:  Nurse Practitioner - Family    Why:  As needed, For repeat evaluation and symptom check    Contact information:    [address] [place] [address] [phone]          Follow up with [facility] Emergency Department.    Specialty:  EMERGENCY MEDICINE    Why:  If symptoms worsen    Contact information:    [address] [place] [address] [phone]    Additional information:    From [address]: Exit at [address] on [address] side of [address]. Turn right on [address]. Turn left at stoplight on [address]. [facility] will be in view two blocks ahead        Discharge Instructions         Treatment for Bone Bruise (Bone Contusion)  A bone bruise is an injury to a bone that is less severe than a bone fracture. Bone bruises are fairly common. They can happen to people of all ages. Any type of bone in your body can get a bone bruise. Other injuries often happen along with a bone bruise, such as damage to nearby ligaments.  Types of treatment  Treatment for a bone bruise may include:    Resting the bone or joint    Putting an ice pack on the area several times a day    Raising the injury above the level of your heart to reduce swelling    Taking medicine to reduce pain and swelling    Wearing a brace or other device to  limit movement, if needed  Your doctor may give you advice about your diet. This is because eating a diet that is rich in calcium, vitamin D, and protein can help you heal. Your doctor may ask you to not use certain over-the-counter medicines for pain. Some of these may delay normal bone healing. If you smoke, your doctor will advise you to stop smoking. Smoking can also delay bone healing.  Your health care provider will tell you how long you should avoid putting weight on your bone. Most bone bruises slowly heal over 2 to 4 months. A larger bone bruise may take longer to heal. You may not be able to return to sports activities for weeks or months. If your symptoms don t go away, your health care provider may give you an MRI.  Possible complications of a bone bruise  Most bone bruises heal without any problems. If your bone bruise is very large, your body may have trouble getting blood flow back to the area. This can cause avascular necrosis of the bone. This leads to death of that part of the bone.     When to call the health care provider  Call your health care provider if your symptoms don t start to get better in a few days. Call him or her right away if you have any severe symptoms, such as a high fever.      Date Last Reviewed: 7/21/2015 2000-2017 The "[employer]. [address] [place] [address], [place] [address]. All rights reserved. This information is not intended as a substitute for professional medical care. Always follow your healthcare professional's instructions.          Preventing Falls: Are You At Risk of Falling?     Ask for help to reduce risk of falling in your home.     As you get older, you're not as steady on your feet as you once were. And you may have health problems you didn't have when you were younger. So, it's not surprising that older people are more likely to trip and fall. Falling can be very serious. It can change your overall health and quality of life. That's why it's  "important to be aware of your own risk of falling.  The dangers of falling  Falls are one of the main causes of injury in people over age 65. An older person who falls may take longer to get better than a younger person. And, after a fall, an older person is more likely to have problems that don't go away. So, preventing falls can help you avoid serious health problems.  Are you at risk of falling?  Answer these questions to rate your level of risk.    Are you a woman?    Have you fallen or stumbled in the last year?    Are you over age 65?    Are you ever dizzy or lightheaded with standing?    Do you have a hard time getting in and out of the bathtub or on and off the toilet?    Do you lean on objects to help you get around? Or do you use a cane or walker?    Do you have vision or hearing problems? For example, do you need new glasses or hearing aids?    Do you have 2 or more long-lasting (chronic) medical conditions?    Do you take 3 or more medicines?    Have you felt depressed recently?    Have you had more trouble with your memory in recent months?    Are there hazards in your home that might cause you to fall, such as loose rugs or poor lighting?    Do you have a pet that jumps on you or might trip you?    Have you stopped getting regular exercise?    Do you have diabetes?     Do you have a neurologic disease, such as Parkinson or Alzheimer disease?     Do you drink alcohol?    Do you wear athletic shoes or slippers, or go barefoot at home?  You can help prevent falls  If you answered \"yes\" to any of the above questions, take steps to reduce your risk of a fall. Monitoring health conditions and keeping walkways in your home free of clutter are just two ways. Changing is sometimes easier said than done. But keep in mind that even small changes can make you less likely to fall.  The fear of falling  It's normal to be scared of falling, especially if you've fallen before. But being afraid can actually make you " more likely to fall. This is because:    Fear might cause you to become less active. Being less active can lead to a loss of strength and balance.    Fear can lead to isolation from others, depression, or the use of more medicines or alcohol. And all these things make falling even more likely.  To break the cycle, learn more about ways to avoid falling. As you take control, you may find yourself feeling less afraid.   Date Last Reviewed: 5/1/2017 2000-2017 [employer]. [address] [place] [address]. All rights reserved. This information is not intended as a substitute for professional medical care. Always follow your healthcare professional's instructions.          Your next 10 appointments already scheduled     Sep 10, 2018 11:15 AM CDT   [facility] Cash Cobb [facility]   [facility]    [address] [place] [address]   [phone]            Sep 26, 2018 11:00 AM CDT   SHORT with MAYI Anthony [facility] ([facility])    [address] [place] [address]   [phone]            Dec 21, 2018  3:00 PM CST   New Visit with Jimmy Martinez MD   [facility]    [address] [place] [address] [phone]              24 Hour Appointment Hotline       To make an appointment at any [facility], call [phone] ([phone]). If you don't have a family doctor or clinic, we will help you find one. [facility] are conveniently located to serve the needs of you and your family.             Review of your medicines      Our records show that you are taking the medicines listed below. If these are incorrect, please call your family doctor or clinic.        Dose / Directions Last dose taken    ACCU-CHEK COMPACT PLUS test strip   Quantity:  102 each   Generic drug:  blood glucose  monitoring        USE TO TEST BLOOD BLOOD SUGARS ONCE DAILY OR AS DIRECTED   Refills:  3        acetaminophen 325 MG tablet   Commonly known as:  TYLENOL   Dose:  650 mg   Quantity:  100 tablet        Take 2 tablets (650 mg) by mouth every 4 hours as needed for mild pain   Refills:  0        amitriptyline 25 MG tablet   Commonly known as:  ELAVIL   Quantity:  90 tablet        TAKE ONE TABLET BY MOUTH ONCE DAILY AT BEDTIME   Refills:  0        blood glucose lancets standard   Commonly known as:  no brand specified   Quantity:  1 Box        Glucose test strips Use to test blood sugar 1 times daily or as directed.   Refills:  11        blood glucose monitoring lancets   Quantity:  100 each        USE TO TEST BLOOD SUGARS ONCE DAILY OR AS DIRECTED   Refills:  11        CARTIA  MG 24 hr capsule   Quantity:  90 capsule   Generic drug:  diltiazem        TAKE ONE CAPSULE BY MOUTH ONCE DAILY   Refills:  2        levothyroxine 50 MCG tablet   Commonly known as:  SYNTHROID/LEVOTHROID   Quantity:  90 tablet        TAKE ONE TABLET BY MOUTH ONCE DAILY   Refills:  2        lisinopril 10 MG tablet   Commonly known as:  PRINIVIL/ZESTRIL   Dose:  10 mg   Quantity:  90 tablet        Take 1 tablet (10 mg) by mouth daily   Refills:  1        metFORMIN 500 MG 24 hr tablet   Commonly known as:  GLUCOPHAGE-XR   Quantity:  270 tablet        ONE TABLET BY MOUTH EVERY MORNING AND TAKE TWO TABLETS BY MOUTH EVERY EVENING WITH MEALS   Refills:  2        MULTIVITAL PO        Take  by mouth. daily   Refills:  0        oxybutynin 5 MG tablet   Commonly known as:  DITROPAN   Quantity:  240 tablet        TAKE TWO TABLETS BY MOUTH TWICE A DAY AS NEEDED BLADDER SPASMS   Refills:  5        pantoprazole 40 MG EC tablet   Commonly known as:  PROTONIX   Dose:  40 mg   Quantity:  180 tablet        Take 1 tablet (40 mg) by mouth 2 times daily Take 30-60 minutes before a meal.   Refills:  2        potassium chloride 10 MEQ tablet   Commonly known as:   K-TAB,KLOR-CON   Quantity:  90 tablet        TAKE ONE TABLET BY MOUTH THREE TIMES A DAY   Refills:  11        pravastatin 40 MG tablet   Commonly known as:  PRAVACHOL   Dose:  40 mg   Quantity:  30 tablet        Take 1 tablet (40 mg) by mouth daily Appointment needed for additional refills.   Refills:  0                Procedures and tests performed during your visit     XR Pelvis w Hip Left 1 View      Orders Needing Specimen Collection     None      Pending Results     No orders found from 9/3/2018 to 9/6/2018.            Pending Culture Results     No orders found from 9/3/2018 to 9/6/2018.            Pending Results Instructions     If you had any lab results that were not finalized at the time of your Discharge, you can call the ED Lab Result RN at [phone]. You will be contacted by this team for any positive Lab results or changes in treatment. The nurses are available 7 days a week from 10A to 6:30P.  You can leave a message 24 hours per day and they will return your call.        Thank you for choosing [facility]       Thank you for choosing [facility] for your care. Our goal is always to provide you with excellent care. Hearing back from our patients is one way we can continue to improve our services. Please take a few minutes to complete the written survey that you may receive in the mail after you visit with us. Thank you!        Care EveryWhere ID     This is your Care EveryWhere ID. This could be used by other organizations to access your [facility] medical records  [id number]        Equal Access to Services     JANETTE RIBEIRO AH: Janelle Call, wachristinada [place], qaybta kaalmact bob aderichar cuellar. So [facility] [phone].    ATENCIÓN: Si habla español, tiene a pedro disposición servicios gratuitos de asistencia lingüística. Llame al [phone].    We comply with applicable federal civil rights laws and [place] laws. We do not discriminate on the basis of race,  color, national origin, age, disability, sex, sexual orientation, or gender identity.            After Visit Summary       This is your record. Keep this with you and show to your community pharmacist(s) and doctor(s) at your next visit.

## 2018-09-05 NOTE — ED PROVIDER NOTES
History     Chief Complaint   Patient presents with     Fall     The history is provided by the patient.     Mya Hui is a 78 year old female who presents to the emergency department with left hip pain that started after she fell on 8/31/18. When the patient fell, she had just gotten out of the shower in her apartment and went to sit on the stool to get dressed, but her right leg gave out on her and she fell on the bathroom floor. She denies hitting her head or injuring any other part of her body except for her left hip/back. She had no loss of consciousness. She had a hard time getting up after the fall. The patient had used a walker only while outside before the fall, but after the fall she needed to use the walker at all times. She has not been in to see a doctor since the fall. She talked to the nurse's line today who told her she should be seen to rule out any broken bones. Patient denies any numbness or tingling in her toes. Patient had a previous fall about a month ago and had some back pain from that fall. She has been taking tylenol 3x daily since her first fall, but has not yet had a follow up appointment.    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     Priority: Medium     Cherry angioma 04/03/2018     Priority: Medium     Gastroesophageal reflux disease, esophagitis presence not specified 09/14/2017     Priority: Medium     Sliding hiatal hernia 09/11/2017     Priority: Medium     Calculus of gallbladder without cholecystitis 09/11/2017     Priority: Medium     Rotator cuff arthropathy, right 08/28/2017     Priority: Medium     Chronic right shoulder pain 08/10/2017     Priority: Medium     Right shoulder pain, unspecified chronicity 08/10/2017     Priority: Medium     Dry mouth 08/10/2017     Priority: Medium     Primary  osteoarthritis involving multiple joints 06/16/2017     Priority: Medium     Cataract, bilateral 03/30/2017     Priority: Medium     Type 2 diabetes mellitus without complication, without long-term current use of insulin (H) 02/16/2017     Priority: Medium     Hypothyroidism, unspecified type 02/14/2017     Priority: Medium     Insomnia, unspecified 02/14/2017     Priority: Medium     OAB (overactive bladder) 09/07/2016     Priority: Medium     Essential hypertension with goal blood pressure less than 140/90 09/07/2016     Priority: Medium     Osteoarthritis of hip 08/21/2014     Priority: Medium     Do you wish to do the replacement in the background? yes         DDD (degenerative disc disease), lumbar 08/21/2014     Priority: Medium     Hearing aid worn 05/30/2014     Priority: Medium     Right ear       Hip pain 09/27/2013     Priority: Medium     Sciatica of right side 06/28/2013     Priority: Medium     Asymptomatic carotid artery stenosis 05/02/2013     Priority: Medium     Hyperlipidemia with target LDL less than 100      Priority: Medium     Diagnosis updated by automated process. Provider to review and confirm.       History of renal calculi      Priority: Medium     Nevus 04/10/2013     Priority: Medium     Do you wish to do the replacement in the background? yes         Advance Care Planning 04/04/2013     Priority: Medium     Advance Care Planning:   Receipt of ACP document:  Received: Health Care Directive which was witnessed or notarized on 3/18/10.  Document not previously scanned.  Validation form completed and sent with document to be scanned.  Code Status reflects choices in most recent ACP document.  Confirmed/documented designated decision maker(s). See permanent comments section of demographics in clinical tab. View document(s) and details by clicking on code status.   Added by Jolie Wagner on 4/23/2014.                   Arthritis      Priority: Medium        Past Medical History:    Past  Medical History:   Diagnosis Date     Acute cholecystitis 3/23/2018     Acute kidney injury (H) 9/10/2017     Arthritis      Dehydration 9/4/2017     Diabetes type 2, controlled (H)      Elevated lactic acid level 9/10/2017     GERD (gastroesophageal reflux disease)      History of renal calculi      HTN, goal below 140/90      Hyperlipidaemia LDL goal < 100      Hypokalemia 9/3/2017     Hypothyroid      Insomnia      Left carotid stenosis      Migraine      Sciatica of right side 6/28/2013       Past Surgical History:    Past Surgical History:   Procedure Laterality Date     BUNIONECTOMY      Right foot     CATARACT IOL, RT/LT Bilateral 2017     COLONOSCOPY  7/12/2013    Procedure: COLONOSCOPY;  Colonoscopy;  Surgeon: Giovany Espinoza MD;  Location: [facility]     FRACTURE TX, ANKLE RT/LT      Left ankle     HC CYSTOURETHROSCOPY W/ URETEROSCOPY &/OR PYELOSCOPY; W/ LITHOTRIPSY       HC REVISE MEDIAN N/CARPAL TUNNEL SURG      Right wrist     INJECT JOINT SACROILIAC  4/10/2014    Procedure: INJECT JOINT SACROILIAC;  Sacroiliac Joint Injection;  Surgeon: Gilbert Mcclure MD;  Location: [facility] OR     LAPAROSCOPIC CHOLECYSTECTOMY N/A 3/24/2018    Procedure: LAPAROSCOPIC CHOLECYSTECTOMY;  Laparoscopic Cholecystectomy;  Surgeon: Berry Allen DO;  Location: [facility] OR       Family History:    Family History   Problem Relation Age of Onset     Hypertension Brother      Cerebrovascular Disease Brother      Diabetes Father      Cardiovascular Father      Diabetes Brother      Borderline     Breast Cancer Mother      Diabetes Brother      Blood Disease Brother      Cardiovascular Brother      MI       Social History:  Marital Status:   [2]  Social History   Substance Use Topics     Smoking status: Never Smoker     Smokeless tobacco: Never Used     Alcohol use Yes        Medications:      ACCU-CHEK COMPACT PLUS test strip   acetaminophen (TYLENOL) 325 MG tablet   amitriptyline (ELAVIL) 25 MG tablet   blood glucose (NO  BRAND SPECIFIED) lancets standard   blood glucose monitoring (SOFTCLIX) lancets   CARTIA  MG 24 hr capsule   levothyroxine (SYNTHROID/LEVOTHROID) 50 MCG tablet   lisinopril (PRINIVIL/ZESTRIL) 10 MG tablet   metFORMIN (GLUCOPHAGE-XR) 500 MG 24 hr tablet   Multiple Vitamins-Minerals (MULTIVITAL PO)   oxybutynin (DITROPAN) 5 MG tablet   pantoprazole (PROTONIX) 40 MG EC tablet   potassium chloride (K-TAB,KLOR-CON) 10 MEQ tablet   pravastatin (PRAVACHOL) 40 MG tablet         Review of Systems   All other systems reviewed and are negative.      Physical Exam   BP: 114/83  Pulse: 65  Temp: 98  F (36.7  C)  Resp: 20  Weight: 86.2 kg (190 lb)  SpO2: 98 %      Physical Exam   Vitals: reviewed by me  General: Pt seen on [facility], cooperative, and alert to conversation  Eyes: Tracking well, clear conjunctiva BL  ENT: MMM, midline trachea.   Lungs:  No tachypnea, no accessory muscle use. No respiratory distress.   CV: Rate as above, regular rhythm.    Abd: Soft, non tender, no guarding, no rebound. Non distended  MSK: no peripheral edema or joint effusion.  No evidence of trauma  No pain to manipulation of pelvic girdle, no  pain to logroll, no pain on ambulation, no skin changes or evidence of trauma to hip knee or ankle.  Does have mild tenderness to palpation to left buttock.  No skin changes, no hematoma felt.  Skin: No rash, normal turgor and temperature  Neuro: Clear speech and no facial droop.  Psych: Not RIS, no e/o AH/      ED Course     ED Course     Procedures    No results found for this or any previous visit (from the past 24 hour(s)).    Medications - No data to display    Assessments & Plan (with Medical Decision Making)     I have reviewed the nursing notes.    I have reviewed the findings, diagnosis, plan and need for follow up with the patient.    Very pleasant 78-year-old female who presents to the emergency room 24 hours after a ground-level fall.  Her pain is located in her left  buttock, and her pelvic x-ray is negative.  Reassuringly, she has no pain to pressure on the pelvis, a negative logroll, and no femoral pain along with no knee pain.  She is ambulatory with strong and independent gait, no indication for CT scan.  Will provide supportive therapy given patient's pain location, and primary care follow-up.  Patient is okay with plan, has walker for home use.    New Prescriptions    No medications on file       Final diagnoses:   Bone bruise   Fall, initial encounter     This document serves as a record of services personally performed by Reuben Lopez*. It was created on their behalf by Chantal Gillis, a trained medical scribe. The creation of this record is based on the provider's personal observations and the statements of the patient. This document has been checked and approved by the attending provider.  Note: Chart documentation done in part with Dragon Voice Recognition software. Although reviewed after completion, some word and grammatical errors may remain.  9/5/2018   [facility] EMERGENCY DEPARTMENT     Reuben Lopez MD  09/05/18 1553

## 2018-09-05 NOTE — DISCHARGE INSTRUCTIONS
Treatment for Bone Bruise (Bone Contusion)  A bone bruise is an injury to a bone that is less severe than a bone fracture. Bone bruises are fairly common. They can happen to people of all ages. Any type of bone in your body can get a bone bruise. Other injuries often happen along with a bone bruise, such as damage to nearby ligaments.  Types of treatment  Treatment for a bone bruise may include:    Resting the bone or joint    Putting an ice pack on the area several times a day    Raising the injury above the level of your heart to reduce swelling    Taking medicine to reduce pain and swelling    Wearing a brace or other device to limit movement, if needed  Your doctor may give you advice about your diet. This is because eating a diet that is rich in calcium, vitamin D, and protein can help you heal. Your doctor may ask you to not use certain over-the-counter medicines for pain. Some of these may delay normal bone healing. If you smoke, your doctor will advise you to stop smoking. Smoking can also delay bone healing.  Your health care provider will tell you how long you should avoid putting weight on your bone. Most bone bruises slowly heal over 2 to 4 months. A larger bone bruise may take longer to heal. You may not be able to return to sports activities for weeks or months. If your symptoms don t go away, your health care provider may give you an MRI.  Possible complications of a bone bruise  Most bone bruises heal without any problems. If your bone bruise is very large, your body may have trouble getting blood flow back to the area. This can cause avascular necrosis of the bone. This leads to death of that part of the bone.     When to call the health care provider  Call your health care provider if your symptoms don t start to get better in a few days. Call him or her right away if you have any severe symptoms, such as a high fever.      Date Last Reviewed: 7/21/2015 2000-2017 [employer] [address]  [place] [address]. All rights reserved. This information is not intended as a substitute for professional medical care. Always follow your healthcare professional's instructions.          Preventing Falls: Are You At Risk of Falling?     Ask for help to reduce risk of falling in your home.     As you get older, you're not as steady on your feet as you once were. And you may have health problems you didn't have when you were younger. So, it's not surprising that older people are more likely to trip and fall. Falling can be very serious. It can change your overall health and quality of life. That's why it's important to be aware of your own risk of falling.  The dangers of falling  Falls are one of the main causes of injury in people over age 65. An older person who falls may take longer to get better than a younger person. And, after a fall, an older person is more likely to have problems that don't go away. So, preventing falls can help you avoid serious health problems.  Are you at risk of falling?  Answer these questions to rate your level of risk.    Are you a woman?    Have you fallen or stumbled in the last year?    Are you over age 65?    Are you ever dizzy or lightheaded with standing?    Do you have a hard time getting in and out of the bathtub or on and off the toilet?    Do you lean on objects to help you get around? Or do you use a cane or walker?    Do you have vision or hearing problems? For example, do you need new glasses or hearing aids?    Do you have 2 or more long-lasting (chronic) medical conditions?    Do you take 3 or more medicines?    Have you felt depressed recently?    Have you had more trouble with your memory in recent months?    Are there hazards in your home that might cause you to fall, such as loose rugs or poor lighting?    Do you have a pet that jumps on you or might trip you?    Have you stopped getting regular exercise?    Do you have diabetes?     Do you have  "a neurologic disease, such as Parkinson or Alzheimer disease?     Do you drink alcohol?    Do you wear athletic shoes or slippers, or go barefoot at home?  You can help prevent falls  If you answered \"yes\" to any of the above questions, take steps to reduce your risk of a fall. Monitoring health conditions and keeping walkways in your home free of clutter are just two ways. Changing is sometimes easier said than done. But keep in mind that even small changes can make you less likely to fall.  The fear of falling  It's normal to be scared of falling, especially if you've fallen before. But being afraid can actually make you more likely to fall. This is because:    Fear might cause you to become less active. Being less active can lead to a loss of strength and balance.    Fear can lead to isolation from others, depression, or the use of more medicines or alcohol. And all these things make falling even more likely.  To break the cycle, learn more about ways to avoid falling. As you take control, you may find yourself feeling less afraid.   Date Last Reviewed: 5/1/2017 2000-2017 [employer]. [address] [place] [address], [place] [address]. All rights reserved. This information is not intended as a substitute for professional medical care. Always follow your healthcare professional's instructions.        "

## 2018-09-05 NOTE — TELEPHONE ENCOUNTER
Mya Hui is a 78 year old female who calls with leg pain due to fall.    NURSING ASSESSMENT:  Description:  Patient is calling to report she was moving from the sink to the toilet on Friday, 8/31/18, when she fell on her left side.  She states she is in so much pain she is unable to lift her leg to get her pants on.  As she is unable to lift her left leg, she is instructed to go to the ED as there are no openings today in clinic.  She states she will get a ride to the ED for further evaluation.    Onset/duration:  5 days  Precip. factors:  fall  Associated symptoms:  See above  Improves/worsens symptoms:  same    Last exam/Treatment:  8/8/18  Allergies:   Allergies   Allergen Reactions     Codeine Nausea     Fentanyl Nausea and Vomiting     VERY sensitive to most narcotics if not all.       NURSING PLAN: Nursing advice to patient to ED for further evaluation as there are no openings in the next couple of hours    RECOMMENDED DISPOSITION:  To ED, another person to drive  Will comply with recommendation: Yes  If further questions/concerns or if symptoms do not improve, worsen or new symptoms develop, call your PCP or [facility] as soon as possible.      Guideline used:    Telephone Triage Protocols for Nurses, Fifth Edition, Liv Cross RN

## 2018-09-10 ENCOUNTER — OFFICE VISIT (OUTPATIENT)
Dept: PODIATRY | Facility: CLINIC | Age: 78
End: 2018-09-10
Payer: COMMERCIAL

## 2018-09-10 VITALS
DIASTOLIC BLOOD PRESSURE: 80 MMHG | BODY MASS INDEX: 31.65 KG/M2 | WEIGHT: 190 LBS | HEIGHT: 65 IN | SYSTOLIC BLOOD PRESSURE: 132 MMHG

## 2018-09-10 DIAGNOSIS — E11.51 DIABETES MELLITUS WITH PERIPHERAL VASCULAR DISEASE (H): Primary | ICD-10-CM

## 2018-09-10 DIAGNOSIS — L85.9 HYPERKERATOSIS: ICD-10-CM

## 2018-09-10 DIAGNOSIS — B35.1 DERMATOPHYTOSIS OF NAIL: ICD-10-CM

## 2018-09-10 PROCEDURE — 11721 DEBRIDE NAIL 6 OR MORE: CPT | Mod: 59 | Performed by: PODIATRIST

## 2018-09-10 PROCEDURE — 11055 PARING/CUTG B9 HYPRKER LES 1: CPT | Mod: GA | Performed by: PODIATRIST

## 2018-09-10 PROCEDURE — 99202 OFFICE O/P NEW SF 15 MIN: CPT | Mod: 25 | Performed by: PODIATRIST

## 2018-09-10 ASSESSMENT — PAIN SCALES - GENERAL: PAINLEVEL: NO PAIN (0)

## 2018-09-10 NOTE — LETTER
9/10/2018         RE: Mya Hui  [address]  [place] [address]        Dear Colleague,    Thank you for referring your patient, Mya Hui, to the [facility]. Please see a copy of my visit note below.    Chief Complaint   Patient presents with     Consult     Right great toe crossing 2nd toe; new issue     Diabetes     DM Type 2 exam - Callus; LOV 5/10/2013       Weight management plan: Patient was referred to their PCP to discuss a diet and exercise plan.     PATIENT HISTORY:        Review of Systems:  Patient denies fever, chills, rash, wound, stiffness, limping, numbness, weakness, heart burn, blood in stool, chest pain with activity, calf pain when walking, shortness of breath with activity, chronic cough, easy bleeding/bruising, swelling of ankles, excessive thirst, fatigue, depression, anxiety.  Patient admits only to symptoms noted in history.     Patient Active Problem List   Diagnosis     Advance Care Planning     Arthritis     Nevus     Asymptomatic carotid artery stenosis     Hyperlipidemia with target LDL less than 100     History of renal calculi     Sciatica of right side     Hip pain     Hearing aid worn     Osteoarthritis of hip     DDD (degenerative disc disease), lumbar     OAB (overactive bladder)     Essential hypertension with goal blood pressure less than 140/90     Hypothyroidism, unspecified type     Insomnia, unspecified     Type 2 diabetes mellitus without complication, without long-term current use of insulin (H)     Cataract, bilateral     Primary osteoarthritis involving multiple joints     Chronic right shoulder pain     Right shoulder pain, unspecified chronicity     Dry mouth     Rotator cuff arthropathy, right     Sliding hiatal hernia     Calculus of gallbladder without cholecystitis     Gastroesophageal reflux disease, esophagitis presence not specified     S/P laparoscopic cholecystectomy     Toe anomaly     Cherry angioma      Hypokalemia     PAST MEDICAL HISTORY:   Past Medical History:   Diagnosis Date     Acute cholecystitis 3/23/2018     Acute kidney injury (H) 9/10/2017     Arthritis      Dehydration 9/4/2017     Diabetes type 2, controlled (H)      Elevated lactic acid level 9/10/2017     GERD (gastroesophageal reflux disease)      History of renal calculi      HTN, goal below 140/90      Hyperlipidaemia LDL goal < 100      Hypokalemia 9/3/2017     Hypothyroid      Insomnia      Left carotid stenosis      Migraine      Sciatica of right side 6/28/2013     PAST SURGICAL HISTORY:   Past Surgical History:   Procedure Laterality Date     BUNIONECTOMY      Right foot     CATARACT IOL, RT/LT Bilateral 2017     COLONOSCOPY  7/12/2013    Procedure: COLONOSCOPY;  Colonoscopy;  Surgeon: Giovany Espinoza MD;  Location: [facility]     FRACTURE TX, ANKLE RT/LT      Left ankle     HC CYSTOURETHROSCOPY W/ URETEROSCOPY &/OR PYELOSCOPY; W/ LITHOTRIPSY       HC REVISE MEDIAN N/CARPAL TUNNEL SURG      Right wrist     INJECT JOINT SACROILIAC  4/10/2014    Procedure: INJECT JOINT SACROILIAC;  Sacroiliac Joint Injection;  Surgeon: Gilbert Mcclure MD;  Location: [facility]     LAPAROSCOPIC CHOLECYSTECTOMY N/A 3/24/2018    Procedure: LAPAROSCOPIC CHOLECYSTECTOMY;  Laparoscopic Cholecystectomy;  Surgeon: Berry Allen DO;  Location: [facility]     MEDICATIONS:   Current Outpatient Prescriptions:      ACCU-CHEK COMPACT PLUS test strip, USE TO TEST BLOOD BLOOD SUGARS ONCE DAILY OR AS DIRECTED, Disp: 102 each, Rfl: 3     acetaminophen (TYLENOL) 325 MG tablet, Take 2 tablets (650 mg) by mouth every 4 hours as needed for mild pain, Disp: 100 tablet, Rfl:      amitriptyline (ELAVIL) 25 MG tablet, TAKE ONE TABLET BY MOUTH ONCE DAILY AT BEDTIME, Disp: 90 tablet, Rfl: 0     blood glucose (NO BRAND SPECIFIED) lancets standard, Glucose test strips Use to test blood sugar 1 times daily or as directed., Disp: 1 Box, Rfl: 11     blood glucose monitoring (SOFTCLIX)  lancets, USE TO TEST BLOOD SUGARS ONCE DAILY OR AS DIRECTED, Disp: 100 each, Rfl: 11     CARTIA  MG 24 hr capsule, TAKE ONE CAPSULE BY MOUTH ONCE DAILY, Disp: 90 capsule, Rfl: 2     levothyroxine (SYNTHROID/LEVOTHROID) 50 MCG tablet, TAKE ONE TABLET BY MOUTH ONCE DAILY, Disp: 90 tablet, Rfl: 2     lisinopril (PRINIVIL/ZESTRIL) 10 MG tablet, Take 1 tablet (10 mg) by mouth daily, Disp: 90 tablet, Rfl: 1     metFORMIN (GLUCOPHAGE-XR) 500 MG 24 hr tablet, ONE TABLET BY MOUTH EVERY MORNING AND TAKE TWO TABLETS BY MOUTH EVERY EVENING WITH MEALS, Disp: 270 tablet, Rfl: 2     Multiple Vitamins-Minerals (MULTIVITAL PO), Take  by mouth. daily, Disp: , Rfl:      oxybutynin (DITROPAN) 5 MG tablet, TAKE TWO TABLETS BY MOUTH TWICE A DAY AS NEEDED BLADDER SPASMS, Disp: 240 tablet, Rfl: 5     pantoprazole (PROTONIX) 40 MG EC tablet, Take 1 tablet (40 mg) by mouth 2 times daily Take 30-60 minutes before a meal., Disp: 180 tablet, Rfl: 2     potassium chloride (K-TAB,KLOR-CON) 10 MEQ tablet, TAKE ONE TABLET BY MOUTH THREE TIMES A DAY, Disp: 90 tablet, Rfl: 11     pravastatin (PRAVACHOL) 40 MG tablet, Take 1 tablet (40 mg) by mouth daily Appointment needed for additional refills., Disp: 30 tablet, Rfl: 0  ALLERGIES:    Allergies   Allergen Reactions     Codeine Nausea     Fentanyl Nausea and Vomiting     VERY sensitive to most narcotics if not all.     SOCIAL HISTORY:   Social History     Social History     Marital status:      Spouse name: N/A     Number of children: N/A     Years of education: N/A     Occupational History      Retired           's office part time     Social History Main Topics     Smoking status: Never Smoker     Smokeless tobacco: Never Used     Alcohol use Yes     Drug use: No     Sexual activity: Not Currently     Other Topics Concern     Not on file     Social History Narrative     FAMILY HISTORY:   Family History   Problem Relation Age of Onset     Hypertension Brother      Cerebrovascular  "Disease Brother      Diabetes Father      Cardiovascular Father      Diabetes Brother      Borderline     Breast Cancer Mother      Diabetes Brother      Blood Disease Brother      Cardiovascular Brother      MI     EXAM:Vitals: /80 (BP Location: Left arm, Cuff Size: Adult Regular)  Ht 5' 5\" (1.651 m)  Wt 190 lb (86.2 kg)  BMI 31.62 kg/m2  BMI= Body mass index is 31.62 kg/(m^2).    General appearance: Patient is alert and fully cooperative with history & exam.  No sign of distress is noted during the visit.     Psychiatric: Affect is pleasant & appropriate.  Patient appears motivated to improve health.     Respiratory: Breathing is regular & unlabored while sitting.     HEENT: Hearing is intact to spoken word.  Speech is clear.  No gross evidence of visual impairment that would impact ambulation.     Vascular: DP 1/4 & PT 1/4 left & right.  CFT about 3 seconds with mild dependent rubor noted about the digits.  Diminished hair growth distal to mid tibia and toes.  Temperature warm to warm proximal to distal.  Minimal Hemosiderin pigmentation noted without varicosities legs and feet bilateral.  Mild to moderate edema bilateral lower extremities.     Neurologic: Normal plantar response bilateral.  Intact protective threshold plus 9/10 applications of a 5.07 monofilament.  Pt admits no burning, tingling or paraesthesias about the feet and toes with palpation.     Dermatologic: Toenails are thickened discolored elongated.  Hyperkeratosis noted about the plantar right first metatarsal head.  Minimal due to dried hematogenous exudate noted throughout debridement.     Musculoskeletal: Patient is ambulatory without assistive device or brace.  There is semi reducible contracture of the lesser digits.  Hallux valgus has been corrected on the right foot approximately 2000 and now has some limited motion in the joint but no pain or complaints    Hemoglobin A1C (%)   Date Value   04/03/2018 6.2   09/04/2017 6.1 (H) "   08/10/2017 5.9   02/14/2017 6.1 (H)   08/16/2016 6.2 (H)   05/22/2015 6.6 (H)   04/18/2014 6.2 (H)   09/27/2013 6.3 (H)     Creatinine (mg/dL)   Date Value   03/23/2018 0.82   09/18/2017 1.16 (H)   09/14/2017 1.08 (H)   09/11/2017 0.97   09/10/2017 1.19 (H)   09/10/2017 1.41 (H)          ASSESSMENT:       ICD-10-CM    1. Diabetes mellitus with peripheral vascular disease (H) E11.51 ORTHOTICS REFERRAL     DEBRIDEMENT OF NAILS, 6 OR MORE     TRIM HYPERKERATOTIC SKIN LESION, ONE   2. Dermatophytosis of nail B35.1 ORTHOTICS REFERRAL     DEBRIDEMENT OF NAILS, 6 OR MORE     TRIM HYPERKERATOTIC SKIN LESION, ONE   3. Hyperkeratosis L85.9 ORTHOTICS REFERRAL     DEBRIDEMENT OF NAILS, 6 OR MORE     TRIM HYPERKERATOTIC SKIN LESION, ONE        PLAN:    9/10/2018  All 10 nails were debrided with a nail nipper.   I sharply debrided one pre ulcerative hyperkeratotic lesions to the level of the dermis as noted above with a 15 blade.    We discussed risk factors and preventive measures.    We discussed appropriate hygiene, shoe gear, daily foot exam, and reinforced management of weight, diet, activity goals and HA1C goal for diabetic patients.   Order today for diabetic shoe gear.  Written instructions regarding appropriate hygiene and foot care.  Dispensed written foot care instructions.     All questions were answered to their satisfaction.    RTC  Once yearly      Cash Cobb DPM              Again, thank you for allowing me to participate in the care of your patient.        Sincerely,        Cash Cobb DPM

## 2018-09-10 NOTE — PROGRESS NOTES
Chief Complaint   Patient presents with     Consult     Right great toe crossing 2nd toe; new issue     Diabetes     DM Type 2 exam - Callus; LOV 5/10/2013       Weight management plan: Patient was referred to their PCP to discuss a diet and exercise plan.     PATIENT HISTORY:        Review of Systems:  Patient denies fever, chills, rash, wound, stiffness, limping, numbness, weakness, heart burn, blood in stool, chest pain with activity, calf pain when walking, shortness of breath with activity, chronic cough, easy bleeding/bruising, swelling of ankles, excessive thirst, fatigue, depression, anxiety.  Patient admits only to symptoms noted in history.     Patient Active Problem List   Diagnosis     Advance Care Planning     Arthritis     Nevus     Asymptomatic carotid artery stenosis     Hyperlipidemia with target LDL less than 100     History of renal calculi     Sciatica of right side     Hip pain     Hearing aid worn     Osteoarthritis of hip     DDD (degenerative disc disease), lumbar     OAB (overactive bladder)     Essential hypertension with goal blood pressure less than 140/90     Hypothyroidism, unspecified type     Insomnia, unspecified     Type 2 diabetes mellitus without complication, without long-term current use of insulin (H)     Cataract, bilateral     Primary osteoarthritis involving multiple joints     Chronic right shoulder pain     Right shoulder pain, unspecified chronicity     Dry mouth     Rotator cuff arthropathy, right     Sliding hiatal hernia     Calculus of gallbladder without cholecystitis     Gastroesophageal reflux disease, esophagitis presence not specified     S/P laparoscopic cholecystectomy     Toe anomaly     Cherry angioma     Hypokalemia     PAST MEDICAL HISTORY:   Past Medical History:   Diagnosis Date     Acute cholecystitis 3/23/2018     Acute kidney injury (H) 9/10/2017     Arthritis      Dehydration 9/4/2017     Diabetes type 2, controlled (H)      Elevated lactic acid level  9/10/2017     GERD (gastroesophageal reflux disease)      History of renal calculi      HTN, goal below 140/90      Hyperlipidaemia LDL goal < 100      Hypokalemia 9/3/2017     Hypothyroid      Insomnia      Left carotid stenosis      Migraine      Sciatica of right side 6/28/2013     PAST SURGICAL HISTORY:   Past Surgical History:   Procedure Laterality Date     BUNIONECTOMY      Right foot     CATARACT IOL, RT/LT Bilateral 2017     COLONOSCOPY  7/12/2013    Procedure: COLONOSCOPY;  Colonoscopy;  Surgeon: Giovany Espinoza MD;  Location: [facility]     FRACTURE TX, ANKLE RT/LT      Left ankle     HC CYSTOURETHROSCOPY W/ URETEROSCOPY &/OR PYELOSCOPY; W/ LITHOTRIPSY       HC REVISE MEDIAN N/CARPAL TUNNEL SURG      Right wrist     INJECT JOINT SACROILIAC  4/10/2014    Procedure: INJECT JOINT SACROILIAC;  Sacroiliac Joint Injection;  Surgeon: Gilbert Mcclure MD;  Location: [facility]     LAPAROSCOPIC CHOLECYSTECTOMY N/A 3/24/2018    Procedure: LAPAROSCOPIC CHOLECYSTECTOMY;  Laparoscopic Cholecystectomy;  Surgeon: Berry Allen DO;  Location: [facility]     MEDICATIONS:   Current Outpatient Prescriptions:      ACCU-CHEK COMPACT PLUS test strip, USE TO TEST BLOOD BLOOD SUGARS ONCE DAILY OR AS DIRECTED, Disp: 102 each, Rfl: 3     acetaminophen (TYLENOL) 325 MG tablet, Take 2 tablets (650 mg) by mouth every 4 hours as needed for mild pain, Disp: 100 tablet, Rfl:      amitriptyline (ELAVIL) 25 MG tablet, TAKE ONE TABLET BY MOUTH ONCE DAILY AT BEDTIME, Disp: 90 tablet, Rfl: 0     blood glucose (NO BRAND SPECIFIED) lancets standard, Glucose test strips Use to test blood sugar 1 times daily or as directed., Disp: 1 Box, Rfl: 11     blood glucose monitoring (SOFTCLIX) lancets, USE TO TEST BLOOD SUGARS ONCE DAILY OR AS DIRECTED, Disp: 100 each, Rfl: 11     CARTIA  MG 24 hr capsule, TAKE ONE CAPSULE BY MOUTH ONCE DAILY, Disp: 90 capsule, Rfl: 2     levothyroxine (SYNTHROID/LEVOTHROID) 50 MCG tablet, TAKE ONE TABLET BY  MOUTH ONCE DAILY, Disp: 90 tablet, Rfl: 2     lisinopril (PRINIVIL/ZESTRIL) 10 MG tablet, Take 1 tablet (10 mg) by mouth daily, Disp: 90 tablet, Rfl: 1     metFORMIN (GLUCOPHAGE-XR) 500 MG 24 hr tablet, ONE TABLET BY MOUTH EVERY MORNING AND TAKE TWO TABLETS BY MOUTH EVERY EVENING WITH MEALS, Disp: 270 tablet, Rfl: 2     Multiple Vitamins-Minerals (MULTIVITAL PO), Take  by mouth. daily, Disp: , Rfl:      oxybutynin (DITROPAN) 5 MG tablet, TAKE TWO TABLETS BY MOUTH TWICE A DAY AS NEEDED BLADDER SPASMS, Disp: 240 tablet, Rfl: 5     pantoprazole (PROTONIX) 40 MG EC tablet, Take 1 tablet (40 mg) by mouth 2 times daily Take 30-60 minutes before a meal., Disp: 180 tablet, Rfl: 2     potassium chloride (K-TAB,KLOR-CON) 10 MEQ tablet, TAKE ONE TABLET BY MOUTH THREE TIMES A DAY, Disp: 90 tablet, Rfl: 11     pravastatin (PRAVACHOL) 40 MG tablet, Take 1 tablet (40 mg) by mouth daily Appointment needed for additional refills., Disp: 30 tablet, Rfl: 0  ALLERGIES:    Allergies   Allergen Reactions     Codeine Nausea     Fentanyl Nausea and Vomiting     VERY sensitive to most narcotics if not all.     SOCIAL HISTORY:   Social History     Social History     Marital status:      Spouse name: N/A     Number of children: N/A     Years of education: N/A     Occupational History      Retired           's office part time     Social History Main Topics     Smoking status: Never Smoker     Smokeless tobacco: Never Used     Alcohol use Yes     Drug use: No     Sexual activity: Not Currently     Other Topics Concern     Not on file     Social History Narrative     FAMILY HISTORY:   Family History   Problem Relation Age of Onset     Hypertension Brother      Cerebrovascular Disease Brother      Diabetes Father      Cardiovascular Father      Diabetes Brother      Borderline     Breast Cancer Mother      Diabetes Brother      Blood Disease Brother      Cardiovascular Brother      MI     EXAM:Vitals: /80 (BP Location: Left  "arm, Cuff Size: Adult Regular)  Ht 5' 5\" (1.651 m)  Wt 190 lb (86.2 kg)  BMI 31.62 kg/m2  BMI= Body mass index is 31.62 kg/(m^2).    General appearance: Patient is alert and fully cooperative with history & exam.  No sign of distress is noted during the visit.     Psychiatric: Affect is pleasant & appropriate.  Patient appears motivated to improve health.     Respiratory: Breathing is regular & unlabored while sitting.     HEENT: Hearing is intact to spoken word.  Speech is clear.  No gross evidence of visual impairment that would impact ambulation.     Vascular: DP 1/4 & PT 1/4 left & right.  CFT about 3 seconds with mild dependent rubor noted about the digits.  Diminished hair growth distal to mid tibia and toes.  Temperature warm to warm proximal to distal.  Minimal Hemosiderin pigmentation noted without varicosities legs and feet bilateral.  Mild to moderate edema bilateral lower extremities.     Neurologic: Normal plantar response bilateral.  Intact protective threshold plus 9/10 applications of a 5.07 monofilament.  Pt admits no burning, tingling or paraesthesias about the feet and toes with palpation.     Dermatologic: Toenails are thickened discolored elongated.  Hyperkeratosis noted about the plantar right first metatarsal head.  Minimal due to dried hematogenous exudate noted throughout debridement.     Musculoskeletal: Patient is ambulatory without assistive device or brace.  There is semi reducible contracture of the lesser digits.  Hallux valgus has been corrected on the right foot approximately 2000 and now has some limited motion in the joint but no pain or complaints    Hemoglobin A1C (%)   Date Value   04/03/2018 6.2   09/04/2017 6.1 (H)   08/10/2017 5.9   02/14/2017 6.1 (H)   08/16/2016 6.2 (H)   05/22/2015 6.6 (H)   04/18/2014 6.2 (H)   09/27/2013 6.3 (H)     Creatinine (mg/dL)   Date Value   03/23/2018 0.82   09/18/2017 1.16 (H)   09/14/2017 1.08 (H)   09/11/2017 0.97   09/10/2017 1.19 (H) "   09/10/2017 1.41 (H)          ASSESSMENT:       ICD-10-CM    1. Diabetes mellitus with peripheral vascular disease (H) E11.51 ORTHOTICS REFERRAL     DEBRIDEMENT OF NAILS, 6 OR MORE     TRIM HYPERKERATOTIC SKIN LESION, ONE   2. Dermatophytosis of nail B35.1 ORTHOTICS REFERRAL     DEBRIDEMENT OF NAILS, 6 OR MORE     TRIM HYPERKERATOTIC SKIN LESION, ONE   3. Hyperkeratosis L85.9 ORTHOTICS REFERRAL     DEBRIDEMENT OF NAILS, 6 OR MORE     TRIM HYPERKERATOTIC SKIN LESION, ONE        PLAN:    9/10/2018  All 10 nails were debrided with a nail nipper.   I sharply debrided one pre ulcerative hyperkeratotic lesions to the level of the dermis as noted above with a 15 blade.    We discussed risk factors and preventive measures.    We discussed appropriate hygiene, shoe gear, daily foot exam, and reinforced management of weight, diet, activity goals and HA1C goal for diabetic patients.   Order today for diabetic shoe gear.  Written instructions regarding appropriate hygiene and foot care.  Dispensed written foot care instructions.     All questions were answered to their satisfaction.    RTC  Once yearly      Cash Cobb DPM

## 2018-09-10 NOTE — MR AVS SNAPSHOT
After Visit Summary   9/10/2018    Mya Hui    MRN: [id number]           Patient Information     Date Of Birth          1940        Visit Information        Provider Department      9/10/2018 11:15 AM Cash Cobb DPM [facility]        Today's Diagnoses     Diabetes mellitus with peripheral vascular disease (H)    -  1    Dermatophytosis of nail        Hyperkeratosis          Care Instructions    DIABETES AND YOUR FEET    What effect does diabetes have on the feet?  Diabetes can result in several problems in the feet including contractures of the tendons leading to deformities and reduced function of the bones, skin ulcers or open sores on pressure points or prominent deformities, reduced sensation, reduced blood flow and thus reduced oxygen and immune cells to the tiny vessels in our feet. This all leads to higher risk of hospitalization, infections, and amputations.     What is neuropathy?  Neuropathy is a term used to describe a loss of nerve function.  Patients with diabetes are at risk of developing neuropathy if their sugars continue to run high and are above the normal value of 140.  The elevated blood sugar in the body enters the nerves causing it to swell and impair nerve function.  The higher the blood sugar and the longer it is elevated, the more damage is done to nerves.  This damage is permanent and irreversible.  These damaged sensory nerves can then cause reduced feeling or cause pain.  Damaged motor nerves can reduce blood flow and white blood cells into into your foot, skin and bones reducing your ability to heal a small problem. And neuropathy can cause tendons to become unbalanced and contribute to the formation of deformity and contractures in our feet. Often times, neuropathy can be prevented by controlling your blood sugar.  Your risk of developing neuropathy goes up dramatically as your hemoglobin A1C raises above 7.5.      How do I know if I  "have neuropathy?  When a person develops neuropathy, they usually begin to feel numbness or tingling in their feet and sometimes in their legs.  Other symptoms may include painful burning or hot feet, tingling, electrical sensations or feeling like insects or ants are crawling on your feet or legs.  If blood sugar remains above 140  for long periods of time, neuropathy can also occur in the hands.  When a person loses their \"protective threshold\" or ability to detect a 5.07 [place] monofilament is when they have elevated risk for developing foot deformity, contractures, foot infections, amputations, Charcot arthropathy, or other complications. Keep your hemoglobin A1C below 7.5 to reduce this risk.    What is vascular disease?  Peripheral vascular disease is a term used to describe a loss or decrease in circulation (blood flow).  There is a problem in getting blood, immune cells, and oxygen to areas that need it.  Similar to neuropathy, sugars can build up in the walls of the arteries (blood vessels) and cause them to become swollen, thickened and hardened.  This decreases the amount of blood that can go to an area that needs it.  Though this is common in the legs of diabetic patients, it can also affect other arteries (blood vessels) in the body such as in the heart, kidney, eyes, and the blood flow into bones.  It is often seen first in the small vessels of her body notably our feet and toes.    How do I know if I have vascular disease?  In the legs, vascular disease usually results in cramping.  Patients who develop leg cramps after walking the same distance every time (i.e. One block, half a mile, ect.) need to let their doctors know so that their circulation may be checked.  Cramps causing severe pain in the feet and/or legs while sleeping and the cramps go away when you stand or hang your legs off the side of the bed, may also be a sign of poor blood circulation.  Occasional cramping in cold weather " or on rare occasions with activity may not be due to poor circulation, but you should inform your doctor.    How can these problems be prevented?  The key to prevention is good blood sugar control all day every day.  Inadequate blood sugar control is the most common way patients experience these problems. Reducing, controlling and measuring your daily consumption of sugar or carbohydrates is essential to understanding and managing diabetes.  Physical activity (exercise) is a very good way to help decrease your blood sugars.  Exercise can lower your blood sugar, blood pressure, and cholesterol.  It also reduces your risk for heart disease and stroke, relieves stress, and strengthens your heart, muscles and bones. Physical activity also increases your balance and reduces development of contractures and foot deformities over time. In addition, regular activity helps insulin work better, improves your blood circulation, and keeps your joints flexible.  If you're trying to lose weight, a combination of exercise and wise food choices can help you reach your target weight and maintain it.  Activity and exercise alone can not make up for poor diet choices, eating too much, or eating too many sugars or carbohydrates.  Ask your doctor for help when you are not meeting your blood sugar goals. Changes or increases in medication are powerful tools in reducing your blood sugar.    Know your blood sugar and hemoglobin A1C trend.  Upon first diagnosis or during acute illness, checking your blood sugar 4 times a day can help you understand how your diet, activity, and lifestyle affect your blood sugar.  Monitoring your hemoglobin A1C can help you understand how well you are managing blood sugar over the long run.  Your hemoglobin A1C tells you what your blood sugar averages all day, every day, over the past 90 days.       To experience the lowest risk of complications associated with diabetes such as neuropathy, loss of blood flow,  bone or joint infection, charcot arthropathy, or amputation, the American Diabetes Association recommends a target hemoglobin A1C of less than 7.0%, while the American Association of Clinical Endocrinologists' recommendation is 6.5% or less.  Both organizations advise that the goals be individualized based on patient factors such as other health conditions, history of hypoglycemia, education, and life expectancy.  A patients risk of experiencing complications associated with diabetes is only slightly elevated with a hemoglobin A1C above 6.0.  However, this risk goes up exponentially when the hemoglobin A1C is above 7.5.  The longer the hemoglobin A1C is elevated, the more risk that patient will experience in their lifetime. The damage that occurs to nerves, blood vessels, tendons, bones and body organs, while their hemoglobin A1C is elevated is mostly irreversible and worsens with each additional time period of elevated hemoglobin A1C.     You must understand and manage your disease.  Your health insurance or medical team cannot manage this disease for you.  When you take responsibility for understanding and managing your disease, you can expect to experience fewer problems associated with diabetes in your lifetime.  You will  Also experience a higher quality of life and health and reduced cost of health care.    Diabetic Foot Care Recommendations  The following are recommendations for avoiding serious foot problems or injury    DO'S  1. Be aware of your hemoglobin A1C and continue to follow up with your medical team for adjustments in your lifestyle and medication until your reach your A1C goal.  Keep this below 7.5 to reduce your risk of developing complications associated with diabetes.    2.  Wash your feet with lukewarm water and a mild soap and then dry them thoroughly, especially between the toes.  Gently floss your towel or washcloth between each toe at every bath.  Soaking your feet in water cannot clean  dead skin, debris, and bacteria from your feet and is not necessary.   3. Examine your feet daily looking for cuts, corns, blisters, cracks, ect..., especially after wearing new shoes or increased or changed activities.  Make sure to look between your toes.  If you cannot see the bottom of your feet, set a mirror on the floor and hold your foot over it, or ask a family member to examine your feet for you daily.  Contact your doctor immediately if new problems are noted or if sores are not healing.  4.  Immediately apply moisturizer cream such as Cetaphil to the tops and bottoms of your feet, avoiding areas between the toes.  Apply sunscreen or cover your feet if they will be exposed to extended sunlight.  5.Use clean comfortable shoes.  Socks should not have thick seams or cut off the circulation around the leg.  Break in new shoes slowly and rotate with older shoes until broken in.  Check the inside of your shoes with your hand to look for areas of irritation or objects that may have fallen into your shoes.    6. Keep slippers by the side of your bed for use during the night.  7. Shoes should be fitted by a professional and should not cause areas of irritation.  Check your feet regularly when wearing a new pair of shoes and replace them as needed.  8.  Talk to your doctor about proper exercise.  Exercise and stretching stimulate blood flow to your feet and maintain proper glucose levels.  Use it or lose it!  9.  Monitor your blood glucose level and your hemoglobin A1C.  Notify your doctor immediately if your blood sugar is abnormally high or low.  10.  Cut your nails straight across, but then gently round any sharp edges with a nail file.  If you have neuropathy, peripheral vascular disease or cannot see that well to trim your own toenails, see a medical professional for care.    DONT'S  1.  Do not soak your feet if you have an open sore or your provider has informed you that you have neuropathy or loss of  "protective threshold.  Use only lukewarm water and always check the temperature with your hand as hot water can easily burn your feet.    2.  Never use a hot water bottle or heating pad on your feet.  Also do not apply hot or cold compresses to your feet.  With decreased sensation, you could burn or freeze your feet.  Do not rest your feet near a heat source such as a heater or heat register.    3.  Do not apply any of these to your feet:    - over the counter medicine for corns or warts    -  Harsh chemicals like boric acid    -  Do not self-treat corns, cuts, blisters or infections.  Always consult your doctor.   4.  Do not wear sandals, slippers or walk barefoot, especially on harsh surfaces.  5.  If you smoke, stop!!!    Nail Debridement    A high quality instrument makes trimming toenails MUCH easier.  Search [employer] for any 5\" nail nipper manufactured by reliable brands such as Miltex, Integra or Jarit as these quality instruments will help manage difficult nails more effectively and comfortably. We use Miltex -SS.  A physician is not necessary to trim nails even if you are taking blood thinners or are diabetic.  Your family or care givers may help manage your toenails.      Trim or sand the nails once weekly.  Do not wait until they are long and painful or trimming will become too difficult and painful and will increase your risk of complications or infection.  A course file or 120 grit sandpaper on a sanding block can be helpful.  For very thick nails many people prefer battery operated pérez such as an Amope', Personal Pedi and Emjoi for regular use or heavy painful callouses or thick toenails.    Trim or skive any portion of nail that is thick, loose, crumbling, or not well attached. Do not tear the nail away, but rather cut them with a nail nipperor sand or sand them down.  You may follow up with your Podiatric Physician if you have pain, bleeding, infection, questions or other concerns.      You may " also contact the following Registered Nurses for further help with nail debridement and minor hygiene concnerns.  They may come to your home or meet them at their clinic to trim your toenails and soak your feet, as well as monitor for any complications that would require evaluation by a Physician.        [facility]  Judit Pena, RN  Office [phone]    [facility]  Amirah Camargo RN  [phone]   Call or text for appointment  Some home visits and has a clinic at:  [address] [place] [address]    [facility]  [phone]  [facility]    [employer]  [phone]  [email or web address]  [facility]    For up to date list and to find foot care nurses in other communities visit American Foot Care Nurses Association website:  afcna.org.         Calluses, Corns, IPKs, Porokeratosis    When there is excessive friction or pressure on the skin, the body responds by making the skin thicker.  While this may protect the deeper structures, the thickened skin can take up more space and thus increase pressure over a bony prominence or become an open sore or skin ulcer as this skin becomes less flexible.    Flat, diffuse thickening are simple calluses and they are usually caused by friction.  Often these are the result of rubbing on a shoe or going barefoot.    Calluses with a central core between the toes are called corns.  These often result from prominent joints on adjacent toes rubbing together.  Theses are often a symptom of bone malalignment and will usually recur unless the underlying bones are addressed.    Many of these lesions can be kept comfortable with routine maintenance. This consists of filing them with a Ped Egg, callus file, or 120 grit sandpaper on a block, every day during your bath or shower.  Most people prefer battery operated pérez such as an Amope', Personal Pedi and Emjoi for regular use or heavy painful  callouses.  Heavy creams or ointments can be applied 1-2 times every day to keep them soft. Toe spacers can be used for corns, gel pads can be used for other lesions on the bottom of the foot. If there is a deformity noted, such as a prominent bone, often this can be addressed to minimize recurrence. However, sometimes the pressure and lesion simply migrates to another spot after surgery, so it is not a guaranteed cure.     If you have severe callouses and cracking, you may apply heavy greasy ointments that you scoop up such as Cetaphil cream, Eucerin, Aquaphor or Vaseline.  Be sure to obtain cream or ointment in these brands and not lotion (lotion is water based and not durable enough for feet). For more aggressive help apply heavy creams or ointment under occlusive dressings such as Saran Wrap or Jelly Feet while sleeping.   Jelly Feet can be obtained at [email or web address].     To be successful with managing hyperkeratotic skin, you must manage hygiene daily.  At your bath or shower time is the easiest time to work on this when skin is most soft.  There is no medical or surgical treatment that will absolutely eliminate many of these symptoms.      Ped[employer]ix is a reliable source for all sorts of foot pads, cushions, or interdigital spacers and foot appliances. Go to [email or web address] or request a catalog at 5-205-TranquilMed.        Please call with any additional questions.               Follow-ups after your visit        Additional Services     ORTHOTICS REFERRAL       One pair extra depth diabetic shoes and 3 pair of custom molded Plastizote inserts per Medicare guidelines.      [facility] Central Scheduling staff will contact patient to arrange appointments. Central Scheduling Phone #:  [phone], [place], [place].                  Your next 10 appointments already scheduled     Sep 26, 2018 11:00 AM CDT   [facility] C[facility]   [facility]    [address] [id number] [phone]            Dec 21, 2018  3:00 PM CST   New Visit with Jimmy Martinez MD   [facility]    [id number] [place] [id number] [phone]              Who to contact     If you have questions or need follow up information about today's clinic visit or your schedule please contact [facility] directly at [phone].  Normal or non-critical lab and imaging results will be communicated to you by MyChart, letter or phone within 4 business days after the clinic has received the results. If you do not hear from us within 7 days, please contact the clinic through MyChart or phone. If you have a critical or abnormal lab result, we will notify you by phone as soon as possible.  Submit refill requests through [employer] or call your pharmacy and they will forward the refill request to us. Please allow 3 business days for your refill to be completed.          Additional Information About Your Visit        Care EveryWhere ID     This is your Care EveryWhere ID. This could be used by other organizations to access your [facility] medical records  [id number]        Your Vitals Were     Height BMI (Body Mass Index)                5' 5\" (1.651 m) 31.62 kg/m2           Blood Pressure from Last 3 Encounters:   09/10/18 132/80   09/05/18 (!) 148/91   08/08/18 (!) 150/92    Weight from Last 3 Encounters:   09/10/18 190 lb (86.2 kg)   09/05/18 190 lb (86.2 kg)   08/08/18 198 lb (89.8 kg)              We Performed the Following     DEBRIDEMENT OF NAILS, 6 OR MORE     ORTHOTICS REFERRAL     TRIM HYPERKERATOTIC SKIN LESION, ONE        Primary Care Provider Office Phone # Fax #    MAYI Anthony [phone] [address] [place] [address]        Equal Access to Services     JANETTE RIBEIRO AH: Hadii herbert de andao Somahi, waaxda luqadaha, qaybta kaalmada devang, " ct spencerichar carvalho'aan ah. So [facility] [phone].    ATENCIÓN: Si habla scott, tiene a pedro disposición servicios gratuitos de asistencia lingüística. Shravan lennon [phone].    We comply with applicable federal civil rights laws and [place] laws. We do not discriminate on the basis of race, color, national origin, age, disability, sex, sexual orientation, or gender identity.            Thank you!     Thank you for choosing [facility]  for your care. Our goal is always to provide you with excellent care. Hearing back from our patients is one way we can continue to improve our services. Please take a few minutes to complete the written survey that you may receive in the mail after your visit with us. Thank you!             Your Updated Medication List - Protect others around you: Learn how to safely use, store and throw away your medicines at www.disposemymeds.org.          This list is accurate as of 9/10/18 11:32 AM.  Always use your most recent med list.                   Brand Name Dispense Instructions for use Diagnosis    ACCU-CHEK COMPACT PLUS test strip   Generic drug:  blood glucose monitoring     102 each    USE TO TEST BLOOD BLOOD SUGARS ONCE DAILY OR AS DIRECTED    Type 2 diabetes mellitus without complication, without long-term current use of insulin (H)       acetaminophen 325 MG tablet    TYLENOL    100 tablet    Take 2 tablets (650 mg) by mouth every 4 hours as needed for mild pain        amitriptyline 25 MG tablet    ELAVIL    90 tablet    TAKE ONE TABLET BY MOUTH ONCE DAILY AT BEDTIME    Insomnia       blood glucose lancets standard    no brand specified    1 Box    Glucose test strips Use to test blood sugar 1 times daily or as directed.    Type 2 diabetes mellitus without complication, without long-term current use of insulin (H)       blood glucose monitoring lancets     100 each    USE TO TEST BLOOD SUGARS ONCE DAILY OR AS DIRECTED    Type 2 diabetes mellitus  without complication, without long-term current use of insulin (H)       CARTIA  MG 24 hr capsule   Generic drug:  diltiazem     90 capsule    TAKE ONE CAPSULE BY MOUTH ONCE DAILY    Essential hypertension with goal blood pressure less than 140/90       levothyroxine 50 MCG tablet    SYNTHROID/LEVOTHROID    90 tablet    TAKE ONE TABLET BY MOUTH ONCE DAILY    Hypothyroidism, unspecified type       lisinopril 10 MG tablet    PRINIVIL/ZESTRIL    90 tablet    Take 1 tablet (10 mg) by mouth daily    Essential hypertension with goal blood pressure less than 140/90       metFORMIN 500 MG 24 hr tablet    GLUCOPHAGE-XR    270 tablet    ONE TABLET BY MOUTH EVERY MORNING AND TAKE TWO TABLETS BY MOUTH EVERY EVENING WITH MEALS    Type 2 diabetes mellitus without complication, without long-term current use of insulin (H)       MULTIVITAL PO      Take  by mouth. daily        oxybutynin 5 MG tablet    DITROPAN    240 tablet    TAKE TWO TABLETS BY MOUTH TWICE A DAY AS NEEDED BLADDER SPASMS    OAB (overactive bladder)       pantoprazole 40 MG EC tablet    PROTONIX    180 tablet    Take 1 tablet (40 mg) by mouth 2 times daily Take 30-60 minutes before a meal.    Gastroesophageal reflux disease, esophagitis presence not specified       potassium chloride 10 MEQ tablet    K-TAB,KLOR-CON    90 tablet    TAKE ONE TABLET BY MOUTH THREE TIMES A DAY    Hypokalemia       pravastatin 40 MG tablet    PRAVACHOL    30 tablet    Take 1 tablet (40 mg) by mouth daily Appointment needed for additional refills.    Hyperlipidemia with target LDL less than 100

## 2018-09-10 NOTE — PATIENT INSTRUCTIONS
"DIABETES AND YOUR FEET    What effect does diabetes have on the feet?  Diabetes can result in several problems in the feet including contractures of the tendons leading to deformities and reduced function of the bones, skin ulcers or open sores on pressure points or prominent deformities, reduced sensation, reduced blood flow and thus reduced oxygen and immune cells to the tiny vessels in our feet. This all leads to higher risk of hospitalization, infections, and amputations.     What is neuropathy?  Neuropathy is a term used to describe a loss of nerve function.  Patients with diabetes are at risk of developing neuropathy if their sugars continue to run high and are above the normal value of 140.  The elevated blood sugar in the body enters the nerves causing it to swell and impair nerve function.  The higher the blood sugar and the longer it is elevated, the more damage is done to nerves.  This damage is permanent and irreversible.  These damaged sensory nerves can then cause reduced feeling or cause pain.  Damaged motor nerves can reduce blood flow and white blood cells into into your foot, skin and bones reducing your ability to heal a small problem. And neuropathy can cause tendons to become unbalanced and contribute to the formation of deformity and contractures in our feet. Often times, neuropathy can be prevented by controlling your blood sugar.  Your risk of developing neuropathy goes up dramatically as your hemoglobin A1C raises above 7.5.      How do I know if I have neuropathy?  When a person develops neuropathy, they usually begin to feel numbness or tingling in their feet and sometimes in their legs.  Other symptoms may include painful burning or hot feet, tingling, electrical sensations or feeling like insects or ants are crawling on your feet or legs.  If blood sugar remains above 140  for long periods of time, neuropathy can also occur in the hands.  When a person loses their \"protective threshold\" " or ability to detect a [address] [place] monofilament is when they have elevated risk for developing foot deformity, contractures, foot infections, amputations, Charcot arthropathy, or other complications. Keep your hemoglobin A1C below 7.5 to reduce this risk.    What is vascular disease?  Peripheral vascular disease is a term used to describe a loss or decrease in circulation (blood flow).  There is a problem in getting blood, immune cells, and oxygen to areas that need it.  Similar to neuropathy, sugars can build up in the walls of the arteries (blood vessels) and cause them to become swollen, thickened and hardened.  This decreases the amount of blood that can go to an area that needs it.  Though this is common in the legs of diabetic patients, it can also affect other arteries (blood vessels) in the body such as in the heart, kidney, eyes, and the blood flow into bones.  It is often seen first in the small vessels of her body notably our feet and toes.    How do I know if I have vascular disease?  In the legs, vascular disease usually results in cramping.  Patients who develop leg cramps after walking the same distance every time (i.e. One block, half a mile, ect.) need to let their doctors know so that their circulation may be checked.  Cramps causing severe pain in the feet and/or legs while sleeping and the cramps go away when you stand or hang your legs off the side of the bed, may also be a sign of poor blood circulation.  Occasional cramping in cold weather or on rare occasions with activity may not be due to poor circulation, but you should inform your doctor.    How can these problems be prevented?  The key to prevention is good blood sugar control all day every day.  Inadequate blood sugar control is the most common way patients experience these problems. Reducing, controlling and measuring your daily consumption of sugar or carbohydrates is essential to understanding and managing diabetes.   Physical activity (exercise) is a very good way to help decrease your blood sugars.  Exercise can lower your blood sugar, blood pressure, and cholesterol.  It also reduces your risk for heart disease and stroke, relieves stress, and strengthens your heart, muscles and bones. Physical activity also increases your balance and reduces development of contractures and foot deformities over time. In addition, regular activity helps insulin work better, improves your blood circulation, and keeps your joints flexible.  If you're trying to lose weight, a combination of exercise and wise food choices can help you reach your target weight and maintain it.  Activity and exercise alone can not make up for poor diet choices, eating too much, or eating too many sugars or carbohydrates.  Ask your doctor for help when you are not meeting your blood sugar goals. Changes or increases in medication are powerful tools in reducing your blood sugar.    Know your blood sugar and hemoglobin A1C trend.  Upon first diagnosis or during acute illness, checking your blood sugar 4 times a day can help you understand how your diet, activity, and lifestyle affect your blood sugar.  Monitoring your hemoglobin A1C can help you understand how well you are managing blood sugar over the long run.  Your hemoglobin A1C tells you what your blood sugar averages all day, every day, over the past 90 days.       To experience the lowest risk of complications associated with diabetes such as neuropathy, loss of blood flow, bone or joint infection, charcot arthropathy, or amputation, the American Diabetes Association recommends a target hemoglobin A1C of less than 7.0%, while the American Association of Clinical Endocrinologists' recommendation is 6.5% or less.  Both organizations advise that the goals be individualized based on patient factors such as other health conditions, history of hypoglycemia, education, and life expectancy.  A patients risk of  experiencing complications associated with diabetes is only slightly elevated with a hemoglobin A1C above 6.0.  However, this risk goes up exponentially when the hemoglobin A1C is above 7.5.  The longer the hemoglobin A1C is elevated, the more risk that patient will experience in their lifetime. The damage that occurs to nerves, blood vessels, tendons, bones and body organs, while their hemoglobin A1C is elevated is mostly irreversible and worsens with each additional time period of elevated hemoglobin A1C.     You must understand and manage your disease.  Your health insurance or medical team cannot manage this disease for you.  When you take responsibility for understanding and managing your disease, you can expect to experience fewer problems associated with diabetes in your lifetime.  You will  Also experience a higher quality of life and health and reduced cost of health care.    Diabetic Foot Care Recommendations  The following are recommendations for avoiding serious foot problems or injury    DO'S  1. Be aware of your hemoglobin A1C and continue to follow up with your medical team for adjustments in your lifestyle and medication until your reach your A1C goal.  Keep this below 7.5 to reduce your risk of developing complications associated with diabetes.    2.  Wash your feet with lukewarm water and a mild soap and then dry them thoroughly, especially between the toes.  Gently floss your towel or washcloth between each toe at every bath.  Soaking your feet in water cannot clean dead skin, debris, and bacteria from your feet and is not necessary.   3. Examine your feet daily looking for cuts, corns, blisters, cracks, ect..., especially after wearing new shoes or increased or changed activities.  Make sure to look between your toes.  If you cannot see the bottom of your feet, set a mirror on the floor and hold your foot over it, or ask a family member to examine your feet for you daily.  Contact your doctor  immediately if new problems are noted or if sores are not healing.  4.  Immediately apply moisturizer cream such as Cetaphil to the tops and bottoms of your feet, avoiding areas between the toes.  Apply sunscreen or cover your feet if they will be exposed to extended sunlight.  5.Use clean comfortable shoes.  Socks should not have thick seams or cut off the circulation around the leg.  Break in new shoes slowly and rotate with older shoes until broken in.  Check the inside of your shoes with your hand to look for areas of irritation or objects that may have fallen into your shoes.    6. Keep slippers by the side of your bed for use during the night.  7. Shoes should be fitted by a professional and should not cause areas of irritation.  Check your feet regularly when wearing a new pair of shoes and replace them as needed.  8.  Talk to your doctor about proper exercise.  Exercise and stretching stimulate blood flow to your feet and maintain proper glucose levels.  Use it or lose it!  9.  Monitor your blood glucose level and your hemoglobin A1C.  Notify your doctor immediately if your blood sugar is abnormally high or low.  10.  Cut your nails straight across, but then gently round any sharp edges with a nail file.  If you have neuropathy, peripheral vascular disease or cannot see that well to trim your own toenails, see a medical professional for care.    DONT'S  1.  Do not soak your feet if you have an open sore or your provider has informed you that you have neuropathy or loss of protective threshold.  Use only lukewarm water and always check the temperature with your hand as hot water can easily burn your feet.    2.  Never use a hot water bottle or heating pad on your feet.  Also do not apply hot or cold compresses to your feet.  With decreased sensation, you could burn or freeze your feet.  Do not rest your feet near a heat source such as a heater or heat register.    3.  Do not apply any of these to your  "feet:    - over the counter medicine for corns or warts    -  Harsh chemicals like boric acid    -  Do not self-treat corns, cuts, blisters or infections.  Always consult your doctor.   4.  Do not wear sandals, slippers or walk barefoot, especially on harsh surfaces.  5.  If you smoke, stop!!!    Nail Debridement    A high quality instrument makes trimming toenails MUCH easier.  Search ebay for any 5\" nail nipper manufactured by reliable brands such as Miltex, Integra or Jarit as these quality instruments will help manage difficult nails more effectively and comfortably. We use Miltex -SS.  A physician is not necessary to trim nails even if you are taking blood thinners or are diabetic.  Your family or care givers may help manage your toenails.      Trim or sand the nails once weekly.  Do not wait until they are long and painful or trimming will become too difficult and painful and will increase your risk of complications or infection.  A course file or 120 grit sandpaper on a sanding block can be helpful.  For very thick nails many people prefer battery operated pérez such as an Amope', Personal Pedi and Emjoi for regular use or heavy painful callouses or thick toenails.    Trim or skive any portion of nail that is thick, loose, crumbling, or not well attached. Do not tear the nail away, but rather cut them with a nail nipperor sand or sand them down.  You may follow up with your Podiatric Physician if you have pain, bleeding, infection, questions or other concerns.      You may also contact the following Registered Nurses for further help with nail debridement and minor hygiene concnerns.  They may come to your home or meet them at their clinic to trim your toenails and soak your feet, as well as monitor for any complications that would require evaluation by a Physician.        [facility]  Judit Pena, RN  Office [phone]    [facility]  Amirah Camargo, " RN  [phone]   Call or text for appointment  Some home visits and has a clinic at:  [address] [address]    [facility]  [phone]  [facility]  [place]    [facility]  [phone]  [email or web address]  [facility]    For up to date list and to find foot care nurses in other communities visit American Foot Care Nurses Association website:  afcna.org.         Calluses, Corns, IPKs, Porokeratosis    When there is excessive friction or pressure on the skin, the body responds by making the skin thicker.  While this may protect the deeper structures, the thickened skin can take up more space and thus increase pressure over a bony prominence or become an open sore or skin ulcer as this skin becomes less flexible.    Flat, diffuse thickening are simple calluses and they are usually caused by friction.  Often these are the result of rubbing on a shoe or going barefoot.    Calluses with a central core between the toes are called corns.  These often result from prominent joints on adjacent toes rubbing together.  Theses are often a symptom of bone malalignment and will usually recur unless the underlying bones are addressed.    Many of these lesions can be kept comfortable with routine maintenance. This consists of filing them with a Ped Egg, callus file, or 120 grit sandpaper on a block, every day during your bath or shower.  Most people prefer battery operated pérez such as an Amope', Personal Pedi and Emjoi for regular use or heavy painful callouses.  Heavy creams or ointments can be applied 1-2 times every day to keep them soft. Toe spacers can be used for corns, gel pads can be used for other lesions on the bottom of the foot. If there is a deformity noted, such as a prominent bone, often this can be addressed to minimize recurrence. However, sometimes the pressure and lesion simply migrates to another spot after surgery, so it is not a guaranteed cure.      If you have severe callouses and cracking, you may apply heavy greasy ointments that you scoop up such as Cetaphil cream, Eucerin, Aquaphor or Vaseline.  Be sure to obtain cream or ointment in these brands and not lotion (lotion is water based and not durable enough for feet). For more aggressive help apply heavy creams or ointment under occlusive dressings such as Saran Wrap or Jelly Feet while sleeping.   Jelly Feet can be obtained at [email or web address].     To be successful with managing hyperkeratotic skin, you must manage hygiene daily.  At your bath or shower time is the easiest time to work on this when skin is most soft.  There is no medical or surgical treatment that will absolutely eliminate many of these symptoms.      Pedifix is a reliable source for all sorts of foot pads, cushions, or interdigital spacers and foot appliances. Go to [email or web address] [email or web address] or request a catalog at 3-944-P&R Labpak.        Please call with any additional questions.

## 2018-09-26 ENCOUNTER — OFFICE VISIT (OUTPATIENT)
Dept: FAMILY MEDICINE | Facility: CLINIC | Age: 78
End: 2018-09-26
Payer: COMMERCIAL

## 2018-09-26 ENCOUNTER — TELEPHONE (OUTPATIENT)
Dept: FAMILY MEDICINE | Facility: CLINIC | Age: 78
End: 2018-09-26

## 2018-09-26 VITALS
BODY MASS INDEX: 32.8 KG/M2 | WEIGHT: 197.1 LBS | RESPIRATION RATE: 26 BRPM | SYSTOLIC BLOOD PRESSURE: 134 MMHG | TEMPERATURE: 97.7 F | OXYGEN SATURATION: 94 % | DIASTOLIC BLOOD PRESSURE: 74 MMHG | HEART RATE: 82 BPM

## 2018-09-26 DIAGNOSIS — M16.11 PRIMARY OSTEOARTHRITIS OF RIGHT HIP: Primary | ICD-10-CM

## 2018-09-26 DIAGNOSIS — E11.9 TYPE 2 DIABETES MELLITUS WITHOUT COMPLICATION, WITHOUT LONG-TERM CURRENT USE OF INSULIN (H): ICD-10-CM

## 2018-09-26 DIAGNOSIS — E03.9 HYPOTHYROIDISM, UNSPECIFIED TYPE: ICD-10-CM

## 2018-09-26 DIAGNOSIS — E78.5 HYPERLIPIDEMIA WITH TARGET LDL LESS THAN 100: ICD-10-CM

## 2018-09-26 DIAGNOSIS — I10 ESSENTIAL HYPERTENSION WITH GOAL BLOOD PRESSURE LESS THAN 140/90: ICD-10-CM

## 2018-09-26 PROCEDURE — 99213 OFFICE O/P EST LOW 20 MIN: CPT | Performed by: NURSE PRACTITIONER

## 2018-09-26 ASSESSMENT — ANXIETY QUESTIONNAIRES
IF YOU CHECKED OFF ANY PROBLEMS ON THIS QUESTIONNAIRE, HOW DIFFICULT HAVE THESE PROBLEMS MADE IT FOR YOU TO DO YOUR WORK, TAKE CARE OF THINGS AT HOME, OR GET ALONG WITH OTHER PEOPLE: NOT DIFFICULT AT ALL
GAD7 TOTAL SCORE: 0
2. NOT BEING ABLE TO STOP OR CONTROL WORRYING: NOT AT ALL
1. FEELING NERVOUS, ANXIOUS, OR ON EDGE: NOT AT ALL
7. FEELING AFRAID AS IF SOMETHING AWFUL MIGHT HAPPEN: NOT AT ALL
3. WORRYING TOO MUCH ABOUT DIFFERENT THINGS: NOT AT ALL
6. BECOMING EASILY ANNOYED OR IRRITABLE: NOT AT ALL
5. BEING SO RESTLESS THAT IT IS HARD TO SIT STILL: NOT AT ALL

## 2018-09-26 ASSESSMENT — PAIN SCALES - GENERAL: PAINLEVEL: NO PAIN (0)

## 2018-09-26 ASSESSMENT — PATIENT HEALTH QUESTIONNAIRE - PHQ9: 5. POOR APPETITE OR OVEREATING: NOT AT ALL

## 2018-09-26 NOTE — TELEPHONE ENCOUNTER
Central Prior Authorization Team   Phone: [phone]      PA Initiation    Medication: Pantoprazole Sodium 40MG dr tablets  Insurance Company: [employer] - Phone [phone] Fax [phone]  Pharmacy Filling the Rx: [id number] - [place] - [address] [place] [address]  Filling Pharmacy Phone: [phone]  Filling Pharmacy Fax:    Start Date: 9/26/2018

## 2018-09-26 NOTE — MR AVS SNAPSHOT
After Visit Summary   9/26/2018    Mya Hui    MRN: [id number]           Patient Information     Date Of Birth          1940        Visit Information        Provider Department      9/26/2018 11:00 AM Chanell Nelson APRN CNP [facility]        Today's Diagnoses     Primary osteoarthritis of right hip    -  1    Hyperlipidemia with target LDL less than 100        Essential hypertension with goal blood pressure less than 140/90        Hypothyroidism, unspecified type        Type 2 diabetes mellitus without complication, without long-term current use of insulin (H)           Follow-ups after your visit        Your next 10 appointments already scheduled     Oct 10, 2018  8:15 AM CDT   LAB with NL LAB [facility] ([facility])    [address] [place] [id number] [phone]           Please do not eat 10-12 hours before your appointment if you are coming in fasting for labs on lipids, cholesterol, or glucose (sugar). This does not apply to pregnant women. Water, hot tea and black coffee (with nothing added) are okay. Do not drink other fluids, diet soda or chew gum.            Oct 10, 2018 11:30 AM CDT   Office Visit with MAYI Anthony CNP   [facility] ([facility])    [address] [place] [address] [id number] [phone]           Bring a current list of meds and any records pertaining to this visit. For Physicals, please bring immunization records and any forms needing to be filled out. Please arrive 10 minutes early to complete paperwork.            Dec 21, 2018  3:00 PM CST   [facility]   [facility]    [address] [place] [id number] [phone]              Future tests that were ordered for you today     Open Future Orders        Priority Expected Expires Ordered    **TSH  "with free T4 reflex FUTURE anytime Routine 2018    **A1C FUTURE anytime Routine 2018    Lipid panel reflex to direct LDL Fasting Routine 2018    **Comprehensive metabolic panel FUTURE anytime Routine 2018    Albumin Random Urine Quantitative with Creat Ratio Routine 2018            Who to contact     If you have questions or need follow up information about today's clinic visit or your schedule please contact [facility] directly at [phone].  Normal or non-critical lab and imaging results will be communicated to you by [employer]hart, letter or phone within 4 business days after the clinic has received the results. If you do not hear from us within 7 days, please contact the clinic through [email or web address] or phone. If you have a critical or abnormal lab result, we will notify you by phone as soon as possible.  Submit refill requests through Onyvax or call your pharmacy and they will forward the refill request to us. Please allow 3 business days for your refill to be completed.          Additional Information About Your Visit        Top[employer] TechnologiesharWhittl Information     Onyvax lets you send messages to your doctor, view your test results, renew your prescriptions, schedule appointments and more. To sign up, go to [email or web address] . Click on \"Log in\" on the left side of the screen, which will take you to the Welcome page. Then click on \"Sign up Now\" on the right side of the page.     You will be asked to enter the access code listed below, as well as some personal information. Please follow the directions to create your username and password.     Your access code is: [email or web address]  Expires: 2018 12:00 PM     Your access code will  in 90 days. If you need help or a new code, please call your [place] clinic or [phone].        Care EveryWhere ID     This is your Care EveryWhere ID. " This could be used by other organizations to access your [facility] medical records  [id number]        Your Vitals Were     Pulse Temperature Respirations Pulse Oximetry BMI (Body Mass Index)       82 97.7  F (36.5  C) (Temporal) 26 94% 32.8 kg/m2        Blood Pressure from Last 3 Encounters:   09/26/18 134/74   09/10/18 132/80   09/05/18 (!) 148/91    Weight from Last 3 Encounters:   09/26/18 197 lb 1.6 oz (89.4 kg)   09/10/18 190 lb (86.2 kg)   09/05/18 190 lb (86.2 kg)               Primary Care Provider Office Phone # Fax #    Chanell MAYI Spence [facility] [phone] [address] [place] [address]        Equal Access to Services     JANETTE RIBEIRO : Hadii aad ku hadasho Soomaali, waaxda luqadaha, qaybta kaalmada adeegyada, ct johnson . So [facility] [phone].    ATENCIÓN: Si habla español, tiene a pedro disposición servicios gratuitos de asistencia lingüística. Shravan al [phone].    We comply with applicable federal civil rights laws and [place] laws. We do not discriminate on the basis of race, color, national origin, age, disability, sex, sexual orientation, or gender identity.            Thank you!     Thank you for choosing [facility]  for your care. Our goal is always to provide you with excellent care. Hearing back from our patients is one way we can continue to improve our services. Please take a few minutes to complete the written survey that you may receive in the mail after your visit with us. Thank you!             Your Updated Medication List - Protect others around you: Learn how to safely use, store and throw away your medicines at www.disposemymeds.org.          This list is accurate as of 9/26/18 12:00 PM.  Always use your most recent med list.                   Brand Name Dispense Instructions for use Diagnosis    ACCU-CHEK COMPACT PLUS test strip   Generic drug:  blood glucose monitoring     102 each    USE TO TEST BLOOD  BLOOD SUGARS ONCE DAILY OR AS DIRECTED    Type 2 diabetes mellitus without complication, without long-term current use of insulin (H)       acetaminophen 325 MG tablet    TYLENOL    100 tablet    Take 2 tablets (650 mg) by mouth every 4 hours as needed for mild pain        amitriptyline 25 MG tablet    ELAVIL    90 tablet    TAKE ONE TABLET BY MOUTH ONCE DAILY AT BEDTIME    Insomnia       blood glucose lancets standard    no brand specified    1 Box    Glucose test strips Use to test blood sugar 1 times daily or as directed.    Type 2 diabetes mellitus without complication, without long-term current use of insulin (H)       blood glucose monitoring lancets     100 each    USE TO TEST BLOOD SUGARS ONCE DAILY OR AS DIRECTED    Type 2 diabetes mellitus without complication, without long-term current use of insulin (H)       CARTIA  MG 24 hr capsule   Generic drug:  diltiazem     90 capsule    TAKE ONE CAPSULE BY MOUTH ONCE DAILY    Essential hypertension with goal blood pressure less than 140/90       levothyroxine 50 MCG tablet    SYNTHROID/LEVOTHROID    90 tablet    TAKE ONE TABLET BY MOUTH ONCE DAILY    Hypothyroidism, unspecified type       lisinopril 10 MG tablet    PRINIVIL/ZESTRIL    90 tablet    Take 1 tablet (10 mg) by mouth daily    Essential hypertension with goal blood pressure less than 140/90       metFORMIN 500 MG 24 hr tablet    GLUCOPHAGE-XR    270 tablet    ONE TABLET BY MOUTH EVERY MORNING AND TAKE TWO TABLETS BY MOUTH EVERY EVENING WITH MEALS    Type 2 diabetes mellitus without complication, without long-term current use of insulin (H)       MULTIVITAL PO      Take  by mouth. daily        oxybutynin 5 MG tablet    DITROPAN    240 tablet    TAKE TWO TABLETS BY MOUTH TWICE A DAY AS NEEDED BLADDER SPASMS    OAB (overactive bladder)       pantoprazole 40 MG EC tablet    PROTONIX    180 tablet    Take 1 tablet (40 mg) by mouth 2 times daily Take 30-60 minutes before a meal.    Gastroesophageal  reflux disease, esophagitis presence not specified       potassium chloride 10 MEQ tablet    K-TAB,KLOR-CON    90 tablet    TAKE ONE TABLET BY MOUTH THREE TIMES A DAY    Hypokalemia       pravastatin 40 MG tablet    PRAVACHOL    30 tablet    Take 1 tablet (40 mg) by mouth daily Appointment needed for additional refills.    Hyperlipidemia with target LDL less than 100

## 2018-09-26 NOTE — TELEPHONE ENCOUNTER
Prior Authorization Retail Medication Request    Medication/Dose: Pantoprazole Sodium 40MG dr tablets    ICD code (if different than what is on RX):    Previously Tried and Failed:  Prevacid, omeprazole, prilosec  Rationale:  Inadequate response with other meds tried    Covermymeds info  Key: [id number]  Patient last name: marialuisa  : 1940

## 2018-09-26 NOTE — PROGRESS NOTES
SUBJECTIVE:   Mya Hui is a 78 year old female who presents to clinic today for the following health issues:      ED/UC Followup:    Facility:  [facility]  Date of visit: 9/5/18  Reason for visit: fall, also seen by Dr Deal 8/8/18  Current Status: doing ok, right side weaker, left hip better         The patient has been having problems with falling.  Has fallen 6 or 7 times in the past couple years.  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  She has worked with her, has convinced her that she really needs to use a walker with ambulation.  Her most recent fall was last month, when she was moving around in her bathroom.  She states she turned towards the side and her right leg gave out.  She was seen in the ED, did not sustain any fractures or head injury.  She denies feeling lightheaded, dizzy, denies palpitations, denies chest pain prior to these incidents when she falls.  One fall earlier this summer occurred when she tripped.  Otherwise, she states her right leg just feels weak and gives out.  She did not have increasing pain before gave out.  She is uncertain if this weakness comes from the knee or the hip, she states it just feels like the entire right lower extremity.  She does have a history of degenerative joint disease.  A few years ago she did get a hip injection for pain in the groin area.  More recently she has been seeing Dr. Carpio for the right shoulder, has had steroid injections which are very helpful.  She is planning to contact Dr. Carpio for another appointment regarding her shoulder, and is planning to talk to her also about the right hip.    Problem list and histories reviewed & adjusted, as indicated.  Additional history: as documented    BP Readings from Last 3 Encounters:   09/26/18 134/74    09/10/18 132/80   09/05/18 (!) 148/91    Wt Readings from Last 3 Encounters:   09/26/18 197 lb 1.6 oz (89.4 kg)   09/10/18 190 lb (86.2 kg)   09/05/18 190 lb (86.2 kg)                    Reviewed and updated as needed this visit by clinical staff       Reviewed and updated as needed this visit by Provider         ROS:  Constitutional, HEENT, cardiovascular, pulmonary, gi and gu systems are negative, except as otherwise noted.    OBJECTIVE:     /74  Pulse 82  Temp 97.7  F (36.5  C) (Temporal)  Resp 26  Wt 197 lb 1.6 oz (89.4 kg)  SpO2 94%  BMI 32.8 kg/m2  Body mass index is 32.8 kg/(m^2).   GENERAL: healthy, alert and no distress  EYES: Eyes grossly normal to inspection, PERRL and conjunctivae and sclerae normal  NECK: no adenopathy, no asymmetry, masses, or scars and thyroid normal to palpation  RESP: lungs clear to auscultation - no rales, rhonchi or wheezes  CV: regular rate and rhythm, normal S1 S2, no S3 or S4, no murmur, click or rub, no peripheral edema and peripheral pulses strong  MS: Patient is ambulatory using a walker  for support.  No obvious deformities noted.  Normal muscle bulk and tone.  Range of motion appears normal for age.        ASSESSMENT/PLAN:     Problem List Items Addressed This Visit        Medium    Hyperlipidemia with target LDL less than 100    Relevant Orders    Lipid panel reflex to direct LDL Fasting    Osteoarthritis of hip - Primary    Essential hypertension with goal blood pressure less than 140/90    Relevant Orders    **Comprehensive metabolic panel FUTURE anytime    Hypothyroidism, unspecified type    Relevant Orders    **TSH with free T4 reflex FUTURE anytime    Type 2 diabetes mellitus without complication, without long-term current use of insulin (H)    Relevant Orders    **A1C FUTURE anytime    Albumin Random Urine Quantitative with Creat Ratio           I am concerned about her falls, and did suggest a referral to our physical therapy department for further  gait evaluation and training.  This was declined by the patient's daughter.  She will the physical therapy visits this summer sent to our clinic.  Have the notes from the patient is planning to make her own appointment with Dr. Carpio regarding the right shoulder and the right lower extremity weakness and pain.    Her ears are plugged, she thought perhaps they needed to be flushed.  Ear canals are completely clear with translucent eardrums, suggested she have her hearing aids evaluated and cleaned    She is due for follow-up of multiple chronic conditions, a future orders have been placed, she will get these drawn and follow-up with myself in clinic for further medication refills    MAYI Anthony [facility]

## 2018-09-26 NOTE — TELEPHONE ENCOUNTER
Prior Authorization Approval    Authorization Effective Date: 6/28/2018  Authorization Expiration Date: 9/26/2019  Medication: Pantoprazole Sodium 40MG dr tablets  Approved Dose/Quantity:    Reference #:     Insurance Company: [employer] - Phone [phone] Fax [phone]  Expected CoPay:       CoPay Card Available:      Foundation Assistance Needed:    Which Pharmacy is filling the prescription (Not needed for infusion/clinic administered): [id number] - [address]  Pharmacy Notified: Yes  Patient Notified: Yes

## 2018-09-27 ASSESSMENT — ANXIETY QUESTIONNAIRES: GAD7 TOTAL SCORE: 0

## 2018-09-27 ASSESSMENT — PATIENT HEALTH QUESTIONNAIRE - PHQ9: SUM OF ALL RESPONSES TO PHQ QUESTIONS 1-9: 0

## 2018-10-01 DIAGNOSIS — E78.5 HYPERLIPIDEMIA WITH TARGET LDL LESS THAN 100: ICD-10-CM

## 2018-10-01 RX ORDER — PRAVASTATIN SODIUM 40 MG
TABLET ORAL
Qty: 30 TABLET | Refills: 11 | Status: SHIPPED | OUTPATIENT
Start: 2018-10-01 | End: 2019-09-25

## 2018-10-01 NOTE — TELEPHONE ENCOUNTER
"Pravastatin  Last Written Prescription Date:  09/04/2018  Last Fill Quantity: 30,  # refills: 0   Last office visit: 9/26/2018 with prescribing provider:  Omar   Future Office Visit:   Next 5 appointments (look out 90 days)     Oct 08, 2018 12:30 PM CDT   [facility]    [address] [place] [id number]   [phone]            Oct 10, 2018 11:30 AM CDT   Office Visit with MAYI Anthony CNP   [facility]    [address] [place] [id number]   [phone]                   Requested Prescriptions   Pending Prescriptions Disp Refills     pravastatin (PRAVACHOL) 40 MG tablet [Pharmacy Med Name: PRAVASTATIN SODIUM 40MG TABS] 30 tablet 0     Sig: TAKE ONE TABLET BY MOUTH ONCE DAILY    Statins Protocol Failed    10/1/2018  1:02 AM       Failed - LDL on file in past 12 months    Recent Labs   Lab Test  08/10/17   1009   LDL  64            Passed - No abnormal creatine kinase in past 12 months    No lab results found.            Passed - Recent (12 mo) or future (30 days) visit within the authorizing provider's specialty    Patient had office visit in the last 12 months or has a visit in the next 30 days with authorizing provider or within the authorizing provider's specialty.  See \"Patient Info\" tab in inbasket, or \"Choose Columns\" in Meds & Orders section of the refill encounter.           Passed - Patient is age 18 or older       Passed - No active pregnancy on record       Passed - No positive pregnancy test in past 12 months          Prescription approved per [facility] Ref[facility] Protocol.    Dorina Johns RN on 10/1/2018 at 5:41 PM    "

## 2018-10-10 ENCOUNTER — OFFICE VISIT (OUTPATIENT)
Dept: FAMILY MEDICINE | Facility: CLINIC | Age: 78
End: 2018-10-10
Payer: COMMERCIAL

## 2018-10-10 VITALS
BODY MASS INDEX: 32.68 KG/M2 | SYSTOLIC BLOOD PRESSURE: 132 MMHG | HEART RATE: 88 BPM | OXYGEN SATURATION: 97 % | WEIGHT: 196.4 LBS | DIASTOLIC BLOOD PRESSURE: 72 MMHG | RESPIRATION RATE: 24 BRPM | TEMPERATURE: 96.7 F

## 2018-10-10 DIAGNOSIS — E78.5 HYPERLIPIDEMIA WITH TARGET LDL LESS THAN 100: ICD-10-CM

## 2018-10-10 DIAGNOSIS — E03.9 HYPOTHYROIDISM, UNSPECIFIED TYPE: ICD-10-CM

## 2018-10-10 DIAGNOSIS — E11.9 TYPE 2 DIABETES MELLITUS WITHOUT COMPLICATION, WITHOUT LONG-TERM CURRENT USE OF INSULIN (H): Primary | ICD-10-CM

## 2018-10-10 DIAGNOSIS — I10 ESSENTIAL HYPERTENSION WITH GOAL BLOOD PRESSURE LESS THAN 140/90: ICD-10-CM

## 2018-10-10 DIAGNOSIS — E11.9 TYPE 2 DIABETES MELLITUS WITHOUT COMPLICATION, WITHOUT LONG-TERM CURRENT USE OF INSULIN (H): ICD-10-CM

## 2018-10-10 PROBLEM — E87.6 HYPOKALEMIA: Status: RESOLVED | Noted: 2018-04-17 | Resolved: 2018-10-10

## 2018-10-10 LAB
ALBUMIN SERPL-MCNC: 3.6 G/DL (ref 3.4–5)
ALP SERPL-CCNC: 62 U/L (ref 40–150)
ALT SERPL W P-5'-P-CCNC: 25 U/L (ref 0–50)
ANION GAP SERPL CALCULATED.3IONS-SCNC: 7 MMOL/L (ref 3–14)
AST SERPL W P-5'-P-CCNC: 18 U/L (ref 0–45)
BILIRUB SERPL-MCNC: 0.3 MG/DL (ref 0.2–1.3)
BUN SERPL-MCNC: 18 MG/DL (ref 7–30)
CALCIUM SERPL-MCNC: 8.9 MG/DL (ref 8.5–10.1)
CHLORIDE SERPL-SCNC: 111 MMOL/L (ref 94–109)
CHOLEST SERPL-MCNC: 143 MG/DL
CO2 SERPL-SCNC: 28 MMOL/L (ref 20–32)
CREAT SERPL-MCNC: 1.11 MG/DL (ref 0.52–1.04)
CREAT UR-MCNC: 300 MG/DL
GFR SERPL CREATININE-BSD FRML MDRD: 48 ML/MIN/1.7M2
GLUCOSE SERPL-MCNC: 120 MG/DL (ref 70–99)
HBA1C MFR BLD: 5.7 % (ref 0–5.6)
HDLC SERPL-MCNC: 40 MG/DL
LDLC SERPL CALC-MCNC: 60 MG/DL
MICROALBUMIN UR-MCNC: 31 MG/L
MICROALBUMIN/CREAT UR: 10.23 MG/G CR (ref 0–25)
NONHDLC SERPL-MCNC: 103 MG/DL
POTASSIUM SERPL-SCNC: 3.7 MMOL/L (ref 3.4–5.3)
PROT SERPL-MCNC: 6.8 G/DL (ref 6.8–8.8)
SODIUM SERPL-SCNC: 146 MMOL/L (ref 133–144)
TRIGL SERPL-MCNC: 213 MG/DL
TSH SERPL DL<=0.005 MIU/L-ACNC: 3.84 MU/L (ref 0.4–4)

## 2018-10-10 PROCEDURE — 84443 ASSAY THYROID STIM HORMONE: CPT | Performed by: NURSE PRACTITIONER

## 2018-10-10 PROCEDURE — 36415 COLL VENOUS BLD VENIPUNCTURE: CPT | Performed by: NURSE PRACTITIONER

## 2018-10-10 PROCEDURE — 99214 OFFICE O/P EST MOD 30 MIN: CPT | Performed by: NURSE PRACTITIONER

## 2018-10-10 PROCEDURE — 82043 UR ALBUMIN QUANTITATIVE: CPT | Performed by: NURSE PRACTITIONER

## 2018-10-10 PROCEDURE — 80053 COMPREHEN METABOLIC PANEL: CPT | Performed by: NURSE PRACTITIONER

## 2018-10-10 PROCEDURE — 80061 LIPID PANEL: CPT | Performed by: NURSE PRACTITIONER

## 2018-10-10 PROCEDURE — 83036 HEMOGLOBIN GLYCOSYLATED A1C: CPT | Mod: QW | Performed by: NURSE PRACTITIONER

## 2018-10-10 RX ORDER — LISINOPRIL 20 MG/1
20 TABLET ORAL DAILY
Qty: 60 TABLET | Refills: 0 | Status: SHIPPED | OUTPATIENT
Start: 2018-10-10 | End: 2018-12-04

## 2018-10-10 RX ORDER — LEVOTHYROXINE SODIUM 50 UG/1
50 TABLET ORAL DAILY
Qty: 90 TABLET | Refills: 2 | Status: CANCELLED | OUTPATIENT
Start: 2018-10-10

## 2018-10-10 RX ORDER — LEVOTHYROXINE SODIUM 75 UG/1
75 TABLET ORAL DAILY
Qty: 60 TABLET | Refills: 0 | Status: SHIPPED | OUTPATIENT
Start: 2018-10-10 | End: 2018-12-04

## 2018-10-10 RX ORDER — LISINOPRIL 10 MG/1
10 TABLET ORAL DAILY
Qty: 90 TABLET | Refills: 1 | Status: CANCELLED | OUTPATIENT
Start: 2018-10-10

## 2018-10-10 NOTE — PROGRESS NOTES
SUBJECTIVE:   Mya Hui is a 78 year old female who presents to clinic today for the following health issues:      Diabetes Follow-up      Patient is checking blood sugars: occasionally  112-120,    Diabetic concerns: None     Symptoms of hypoglycemia (low blood sugar): tired all the time     Paresthesias (numbness or burning in feet) or sores: No     Date of last diabetic eye exam: 12/17    Diabetes Management Resources    Hyperlipidemia Follow-Up      Rate your low fat/cholesterol diet?: not as good as she should    Taking statin?  Yes, no muscle aches from statin    Other lipid medications/supplements?:  none    Hypertension Follow-up      Outpatient blood pressures checks only with when she gets a headaches, it tends to be high    Low Salt Diet: no added salt    BP Readings from Last 2 Encounters:   09/26/18 134/74   09/10/18 132/80     Hemoglobin A1C (%)   Date Value   10/10/2018 5.7 (H)   04/03/2018 6.2     LDL Cholesterol Calculated (mg/dL)   Date Value   10/10/2018 60   08/10/2017 64       Amount of exercise or physical activity: walking a little,     Problems taking medications regularly: No    Medication side effects: none    Diet: regular (no restrictions)        Mya is accompanied to clinic today by her daughter.  She is feeling well with the exception of being very tired.  She is wondering if perhaps changing some of her medications would make a difference.  She had her labs drawn, and is here to review those results    Problem list and histories reviewed & adjusted, as indicated.  Additional history: as documented    BP Readings from Last 3 Encounters:   10/10/18 132/72   09/26/18 134/74   09/10/18 132/80    Wt Readings from Last 3 Encounters:   10/10/18 196 lb 6.4 oz (89.1 kg)   09/26/18 197 lb 1.6 oz (89.4 kg)   09/10/18 190 lb (86.2 kg)                    Reviewed and updated as needed this visit by clinical staff       Reviewed and updated as needed this visit by Provider          ROS:  Constitutional, HEENT, cardiovascular, pulmonary, gi and gu systems are negative, except as otherwise noted.    OBJECTIVE:     /72  Pulse 88  Temp 96.7  F (35.9  C) (Temporal)  Resp 24  Wt 196 lb 6.4 oz (89.1 kg)  SpO2 97%  BMI 32.68 kg/m2  Body mass index is 32.68 kg/(m^2).   GENERAL: healthy, alert and no distress  NECK: no adenopathy, no asymmetry, masses, or scars and thyroid normal to palpation  RESP: lungs clear to auscultation - no rales, rhonchi or wheezes  CV: regular rate and rhythm, normal S1 S2, no S3 or S4, no murmur, click or rub, no peripheral edema and peripheral pulses strong  MS: no gross musculoskeletal defects noted, no edema  NEURO: Normal strength and tone, mentation intact and speech normal    Diagnostic Test Results:  Results for orders placed or performed in visit on 10/10/18   **A1C FUTURE anytime   Result Value Ref Range    Hemoglobin A1C 5.7 (H) 0 - 5.6 %   Lipid panel reflex to direct LDL Fasting   Result Value Ref Range    Cholesterol 143 <200 mg/dL    Triglycerides 213 (H) <150 mg/dL    HDL Cholesterol 40 (L) >49 mg/dL    LDL Cholesterol Calculated 60 <100 mg/dL    Non HDL Cholesterol 103 <130 mg/dL   **Comprehensive metabolic panel FUTURE anytime   Result Value Ref Range    Sodium 146 (H) 133 - 144 mmol/L    Potassium 3.7 3.4 - 5.3 mmol/L    Chloride 111 (H) 94 - 109 mmol/L    Carbon Dioxide 28 20 - 32 mmol/L    Anion Gap 7 3 - 14 mmol/L    Glucose 120 (H) 70 - 99 mg/dL    Urea Nitrogen 18 7 - 30 mg/dL    Creatinine 1.11 (H) 0.52 - 1.04 mg/dL    GFR Estimate 48 (L) >60 mL/min/1.7m2    GFR Estimate If Black 58 (L) >60 mL/min/1.7m2    Calcium 8.9 8.5 - 10.1 mg/dL    Bilirubin Total 0.3 0.2 - 1.3 mg/dL    Albumin 3.6 3.4 - 5.0 g/dL    Protein Total 6.8 6.8 - 8.8 g/dL    Alkaline Phosphatase 62 40 - 150 U/L    ALT 25 0 - 50 U/L    AST 18 0 - 45 U/L   Albumin Random Urine Quantitative with Creat Ratio   Result Value Ref Range    Creatinine Urine 300 mg/dL    Albumin  Urine mg/L 31 mg/L    Albumin Urine mg/g Cr 10.23 0 - 25 mg/g Cr   **TSH with free T4 reflex FUTURE anytime   Result Value Ref Range    TSH 3.84 0.40 - 4.00 mU/L       ASSESSMENT/PLAN:   (E11.9) Type 2 diabetes mellitus without complication, without long-term current use of insulin (H)  (primary encounter diagnosis)  Comment: Hemoglobin A1c is within goal   plan: Continue Glucophage  mg in the morning and 1000 mg at dinner.  Recheck hemoglobin A1c in 6 months    (E03.9) Hypothyroidism, unspecified type  Comment: TSH is at the upper limit of normal, and has gone up from 2.236 months ago.  Will increase her Synthroid to see if that helps with her fatigue and recheck TSH in 2 months  Plan: levothyroxine (SYNTHROID/LEVOTHROID) 75 MCG         tablet, **TSH with free T4 reflex FUTURE 2mo          (I10) Essential hypertension with goal blood pressure less than 140/90  Comment: Blood pressure is within goal.  Her diltiazem could be contributing to fatigue, will discontinue and increase her lisinopril from 10 mg to 20 mg daily for blood pressure management.  She will check her blood pressure several times weekly, contact clinic if it is running higher than 140/90  Plan: lisinopril (PRINIVIL/ZESTRIL) 20 MG tablet           (E78.5) Hyperlipidemia with target LDL less than 100  Comment: LDL is within goal  Plan: Continue pravastatin 40 mg daily    We did discuss the fact that her amitriptyline may also be contributing to fatigue, could also be contributing factor to her falls that she has had several times in the past year.  She is taking amitriptyline for prophylaxis of migraine headaches.  She states she used to have severe headaches if she did not take it.  She is going to try going without it for 1 or 2 weeks.  Will contact clinic and let me know how this is working for her  If she is doing well with these current medication changes, will follow-up in clinic in 6 months.  If having any concerns, follow-up  MAYI Melton [facility]

## 2018-10-10 NOTE — MR AVS SNAPSHOT
After Visit Summary   10/10/2018    Mya Hui    MRN: [id number]           Patient Information     Date Of Birth          1940        Visit Information        Provider Department      10/10/2018 11:30 AM Chanell Nelson APRN CNP [facility]        Today's Diagnoses     Hypothyroidism, unspecified type        Essential hypertension with goal blood pressure less than 140/90           Follow-ups after your visit        Your next 10 appointments already scheduled     Oct 17, 2018  1:10 PM CDT   Return Visit with Annamaria Carpio MD   [facility] ([facility]    [address] [address]   [phone]            Dec 21, 2018  3:00 PM CST   New Visit with Jimmy Martinez MD   [facility]    [address] [place] [id number] [phone]              Future tests that were ordered for you today     Open Future Orders        Priority Expected Expires Ordered    **TSH with free T4 reflex FUTURE 2mo Routine 12/9/2018 2/7/2019 10/10/2018            Who to contact     If you have questions or need follow up information about today's clinic visit or your schedule please contact [facility] directly at [phone].  Normal or non-critical lab and imaging results will be communicated to you by Yoonohart, letter or phone within 4 business days after the clinic has received the results. If you do not hear from us within 7 days, please contact the clinic through Yoonohart or phone. If you have a critical or abnormal lab result, we will notify you by phone as soon as possible.  Submit refill requests through [employer] or call your pharmacy and they will forward the refill request to us. Please allow 3 business days for your refill to be completed.          Additional Information About Your Visit        FindProz Information     [employer] lets you send messages  "to your doctor, view your test results, renew your prescriptions, schedule appointments and more. To sign up, go to [email or web address] . Click on \"Log in\" on the left side of the screen, which will take you to the Welcome page. Then click on \"Sign up Now\" on the right side of the page.     You will be asked to enter the access code listed below, as well as some personal information. Please follow the directions to create your username and password.     Your access code is: [email or web address]  Expires: 2018 12:00 PM     Your access code will  in 90 days. If you need help or a new code, please call your [place] clinic or [phone].        Care EveryWhere ID     This is your Care EveryWhere ID. This could be used by other organizations to access your [place] medical records  [id number]        Your Vitals Were     Pulse Temperature Respirations Pulse Oximetry BMI (Body Mass Index)       88 96.7  F (35.9  C) (Temporal) 24 97% 32.68 kg/m2        Blood Pressure from Last 3 Encounters:   10/10/18 152/90   18 134/74   09/10/18 132/80    Weight from Last 3 Encounters:   10/10/18 196 lb 6.4 oz (89.1 kg)   18 197 lb 1.6 oz (89.4 kg)   09/10/18 190 lb (86.2 kg)                 Today's Medication Changes          These changes are accurate as of 10/10/18 12:08 PM.  If you have any questions, ask your nurse or doctor.               These medicines have changed or have updated prescriptions.        Dose/Directions    levothyroxine 75 MCG tablet   Commonly known as:  SYNTHROID/LEVOTHROID   This may have changed:    - medication strength  - See the new instructions.   Used for:  Hypothyroidism, unspecified type   Changed by:  Chanell Nelson APRN CNP        Dose:  75 mcg   Take 1 tablet (75 mcg) by mouth daily   Quantity:  60 tablet   Refills:  0       lisinopril 20 MG tablet   Commonly known as:  PRINIVIL/ZESTRIL   This may have changed:    - medication strength  - how much to take   Used for:  " Essential hypertension with goal blood pressure less than 140/90   Changed by:  Chanell Nelson APRN CNP        Dose:  20 mg   Take 1 tablet (20 mg) by mouth daily   Quantity:  60 tablet   Refills:  0         Stop taking these medicines if you haven't already. Please contact your care team if you have questions.     CARTIA  MG 24 hr capsule   Generic drug:  diltiazem   Stopped by:  Chanell Nelson APRN CNP                Where to get your medicines      These medications were sent to [id number] - [place], [place] - [address]  [address] [address]    Hours:  Formerly [place] - numbers unchanged   9/8/03 kr Phone:  [phone]     levothyroxine 75 MCG tablet    lisinopril 20 MG tablet                Primary Care Provider Office Phone # Fax #    MAYI Anthony [phone] [address] [place] [address]        Equal Access to Services     [facility]: Hadii herbert ku hadasho Soomaali, waaxda luqadaha, qaybta kaalmada adeegyada, waxay kimberlyin hayramon johnson . So [facility] [phone].    ATENCIÓN: Si habla español, tiene a pedro disposición servicios gratuitos de asistencia lingüística. [facility] [phone].    We comply with applicable federal civil rights laws and [place] laws. We do not discriminate on the basis of race, color, national origin, age, disability, sex, sexual orientation, or gender identity.            Thank you!     Thank you for choosing [facility]  for your care. Our goal is always to provide you with excellent care. Hearing back from our patients is one way we can continue to improve our services. Please take a few minutes to complete the written survey that you may receive in the mail after your visit with us. Thank you!             Your Updated Medication List - Protect others around you: Learn how to safely use, store and throw away your medicines at www.disposemymeds.org.          This list is accurate  as of 10/10/18 12:08 PM.  Always use your most recent med list.                   Brand Name Dispense Instructions for use Diagnosis    ACCU-CHEK COMPACT PLUS test strip   Generic drug:  blood glucose monitoring     102 each    USE TO TEST BLOOD BLOOD SUGARS ONCE DAILY OR AS DIRECTED    Type 2 diabetes mellitus without complication, without long-term current use of insulin (H)       acetaminophen 325 MG tablet    TYLENOL    100 tablet    Take 2 tablets (650 mg) by mouth every 4 hours as needed for mild pain        amitriptyline 25 MG tablet    ELAVIL    90 tablet    TAKE ONE TABLET BY MOUTH ONCE DAILY AT BEDTIME    Insomnia       blood glucose lancets standard    no brand specified    1 Box    Glucose test strips Use to test blood sugar 1 times daily or as directed.    Type 2 diabetes mellitus without complication, without long-term current use of insulin (H)       blood glucose monitoring lancets     100 each    USE TO TEST BLOOD SUGARS ONCE DAILY OR AS DIRECTED    Type 2 diabetes mellitus without complication, without long-term current use of insulin (H)       levothyroxine 75 MCG tablet    SYNTHROID/LEVOTHROID    60 tablet    Take 1 tablet (75 mcg) by mouth daily    Hypothyroidism, unspecified type       lisinopril 20 MG tablet    PRINIVIL/ZESTRIL    60 tablet    Take 1 tablet (20 mg) by mouth daily    Essential hypertension with goal blood pressure less than 140/90       metFORMIN 500 MG 24 hr tablet    GLUCOPHAGE-XR    270 tablet    ONE TABLET BY MOUTH EVERY MORNING AND TAKE TWO TABLETS BY MOUTH EVERY EVENING WITH MEALS    Type 2 diabetes mellitus without complication, without long-term current use of insulin (H)       MULTIVITAL PO      Take  by mouth. daily        oxybutynin 5 MG tablet    DITROPAN    240 tablet    TAKE TWO TABLETS BY MOUTH TWICE A DAY AS NEEDED BLADDER SPASMS    OAB (overactive bladder)       pantoprazole 40 MG EC tablet    PROTONIX    180 tablet    Take 1 tablet (40 mg) by mouth 2 times  daily Take 30-60 minutes before a meal.    Gastroesophageal reflux disease, esophagitis presence not specified       potassium chloride 10 MEQ tablet    K-TAB,KLOR-CON    90 tablet    TAKE ONE TABLET BY MOUTH THREE TIMES A DAY    Hypokalemia       pravastatin 40 MG tablet    PRAVACHOL    30 tablet    TAKE ONE TABLET BY MOUTH ONCE DAILY    Hyperlipidemia with target LDL less than 100

## 2018-10-17 ENCOUNTER — OFFICE VISIT (OUTPATIENT)
Dept: ORTHOPEDICS | Facility: CLINIC | Age: 78
End: 2018-10-17
Payer: COMMERCIAL

## 2018-10-17 VITALS
DIASTOLIC BLOOD PRESSURE: 80 MMHG | BODY MASS INDEX: 32.99 KG/M2 | SYSTOLIC BLOOD PRESSURE: 138 MMHG | HEIGHT: 65 IN | WEIGHT: 198 LBS

## 2018-10-17 DIAGNOSIS — M12.811 ROTATOR CUFF ARTHROPATHY, RIGHT: Primary | ICD-10-CM

## 2018-10-17 PROCEDURE — 20610 DRAIN/INJ JOINT/BURSA W/O US: CPT | Mod: RT | Performed by: ORTHOPAEDIC SURGERY

## 2018-10-17 ASSESSMENT — PAIN SCALES - GENERAL: PAINLEVEL: EXTREME PAIN (8)

## 2018-10-17 NOTE — MR AVS SNAPSHOT
"              After Visit Summary   10/17/2018    Mya Hui    MRN: [id number]           Patient Information     Date Of Birth          1940        Visit Information        Provider Department      10/17/2018 1:10 PM Annamaria Carpio MD [facility]        Today's Diagnoses     Rotator cuff arthropathy, right    -  1       Follow-ups after your visit        Your next 10 appointments already scheduled     Oct 19, 2018 10:00 AM CDT   [facility] Visit with [facility]nam[facility]io MD   [facility]    [address] [address] [phone]            Nov 27, 2018  8:30 AM CST   New Visit with Efren Osroio MD   [facility] ([facility]    [address] [place] [address] [phone]              Who to contact     If you have questions or need follow up information about today's clinic visit or your schedule please contact [facility] directly at [phone].  Normal or non-critical lab and imaging results will be communicated to you by [employer], letter or phone within 4 business days after the clinic has received the results. If you do not hear from us within 7 days, please contact the clinic through [employer] or phone. If you have a critical or abnormal lab result, we will notify you by phone as soon as possible.  Submit refill requests through [email or web address] or call your pharmacy and they will forward the refill request to us. Please allow 3 business days for your refill to be completed.          Additional Information About Your Visit        MyChart Information     [email or web address] lets you send messages to your doctor, view your test results, renew your prescriptions, schedule appointments and more. To sign up, go to [email or web address] . Click on \"Log in\" on the left side of the screen, which will take you to the Welcome page. Then click on \"Sign up Now\" on the right " "side of the page.     You will be asked to enter the access code listed below, as well as some personal information. Please follow the directions to create your username and password.     Your access code is: [email or web address]  Expires: 2018 12:00 PM     Your access code will  in 90 days. If you need help or a new code, please call your [place] clinic or [phone].        Care EveryWhere ID     This is your Care EveryWhere ID. This could be used by other organizations to access your [place] medical records  [id number]        Your Vitals Were     Height BMI (Body Mass Index)                1.651 m (5' 5\") 32.95 kg/m2           Blood Pressure from Last 3 Encounters:   10/17/18 138/80   10/10/18 132/72   18 134/74    Weight from Last 3 Encounters:   10/17/18 89.8 kg (198 lb)   10/10/18 89.1 kg (196 lb 6.4 oz)   18 89.4 kg (197 lb 1.6 oz)              We Performed the Following     Kenalog 40 MG  []     Large Joint/Bursa injection and/or drainage (Shoulder, Knee)        Primary Care Provider Office Phone # Fax #    MAYI [facility] [phone] [address] [place] [address]        Equal Access to Services     JANETTE RIBEIRO AH: Hadii aad ku hadasho Soomaali, waaxda luqadaha, qaybta kaalmada adebandar, ct cuellar. So [facility] [phone].    ATENCIÓN: Si habla español, tiene a pedro disposición servicios gratuitos de asistencia lingüística. Shravan al [phone].    We comply with applicable federal civil rights laws and [place] laws. We do not discriminate on the basis of race, color, national origin, age, disability, sex, sexual orientation, or gender identity.            Thank you!     Thank you for choosing [facility]  for your care. Our goal is always to provide you with excellent care. Hearing back from our patients is one way we can continue to improve our services. Please take a few minutes to complete the " written survey that you may receive in the mail after your visit with us. Thank you!             Your Updated Medication List - Protect others around you: Learn how to safely use, store and throw away your medicines at www.disposemymeds.org.          This list is accurate as of 10/17/18  2:11 PM.  Always use your most recent med list.                   Brand Name Dispense Instructions for use Diagnosis    ACCU-CHEK COMPACT PLUS test strip   Generic drug:  blood glucose monitoring     102 each    USE TO TEST BLOOD BLOOD SUGARS ONCE DAILY OR AS DIRECTED    Type 2 diabetes mellitus without complication, without long-term current use of insulin (H)       acetaminophen 325 MG tablet    TYLENOL    100 tablet    Take 2 tablets (650 mg) by mouth every 4 hours as needed for mild pain        amitriptyline 25 MG tablet    ELAVIL    90 tablet    TAKE ONE TABLET BY MOUTH ONCE DAILY AT BEDTIME    Insomnia       blood glucose lancets standard    no brand specified    1 Box    Glucose test strips Use to test blood sugar 1 times daily or as directed.    Type 2 diabetes mellitus without complication, without long-term current use of insulin (H)       blood glucose monitoring lancets     100 each    USE TO TEST BLOOD SUGARS ONCE DAILY OR AS DIRECTED    Type 2 diabetes mellitus without complication, without long-term current use of insulin (H)       levothyroxine 75 MCG tablet    SYNTHROID/LEVOTHROID    60 tablet    Take 1 tablet (75 mcg) by mouth daily    Hypothyroidism, unspecified type       lisinopril 20 MG tablet    PRINIVIL/ZESTRIL    60 tablet    Take 1 tablet (20 mg) by mouth daily    Essential hypertension with goal blood pressure less than 140/90       metFORMIN 500 MG 24 hr tablet    GLUCOPHAGE-XR    270 tablet    ONE TABLET BY MOUTH EVERY MORNING AND TAKE TWO TABLETS BY MOUTH EVERY EVENING WITH MEALS    Type 2 diabetes mellitus without complication, without long-term current use of insulin (H)       MULTIVITAL PO      Take   by mouth. daily        oxybutynin 5 MG tablet    DITROPAN    240 tablet    TAKE TWO TABLETS BY MOUTH TWICE A DAY AS NEEDED BLADDER SPASMS    OAB (overactive bladder)       pantoprazole 40 MG EC tablet    PROTONIX    180 tablet    Take 1 tablet (40 mg) by mouth 2 times daily Take 30-60 minutes before a meal.    Gastroesophageal reflux disease, esophagitis presence not specified       potassium chloride 10 MEQ tablet    K-TAB,KLOR-CON    90 tablet    TAKE ONE TABLET BY MOUTH THREE TIMES A DAY    Hypokalemia       pravastatin 40 MG tablet    PRAVACHOL    30 tablet    TAKE ONE TABLET BY MOUTH ONCE DAILY    Hyperlipidemia with target LDL less than 100

## 2018-10-17 NOTE — LETTER
"    10/17/2018         RE: Mya Hui  [address]  [place] [address]        Dear Colleague,    Thank you for referring your patient, Mya Hui, to the [facility]. Please see a copy of my visit note below.    Office Visit-Follow up      Chief Complaint: Mya Hui is a 78 year old female who is being seen for   Chief Complaint   Patient presents with     RECHECK     rt shoulder would like inj last inj given on 6/219/18         History of Present Illness:   Last injection in June helped, she would like another today      Past medical history reviewed and there is no significant change    Patient does not use Tobacco products.    REVIEW OF SYSTEMS  General: negative for, night sweats, dizziness, fatigue  Resp: No shortness of breath and no cough  CV: negative for chest pain, syncope or near-syncope  GI: negative for nausea, vomiting and diarrhea  : negative for dysuria and hematuria  Musculoskeletal: as above  Neurologic: negative for syncope   Hematologic: negative for bleeding disorder      Physical Exam:  Vitals: /80  Ht 1.651 m (5' 5\")  Wt 89.8 kg (198 lb)  BMI 32.95 kg/m2  BMI= Body mass index is 32.95 kg/(m^2).  Constitutional: healthy, alert and no acute distress   Psychiatric: mentation appears normal and affect normal/bright  NEURO: no focal deficits  RESP: Normal with easy respirations and no use of accessory muscles to breathe, no audible wheezing or retractions  CV: No peripheral edema  SKIN: No erythema, rashes, excoriation, or breakdown. No evidence of infection.   JOINT/EXTREMITIES:right shoulder - almost FROM, weak abduction and ER, good IT strength, global pain  GAIT: antalgic      Diagnostic Modalities:  None today.        Impression: right Rotator cuff arthropathy    Plan:  All of the above pertinent physical exam and imaging modalities findings was reviewed with Mya.                                          INJECTION PROCEDURE:  The " patient was counseled about an  injection, including discussion of risks (including infection), contents of the injection, rationale for performing the injection, and expected benefits of the injection. The skin was prepped with alcohol and betadine and then utilizing sterile technique an injection of the right shoulder subacromial space from the anterolateral approach  was performed. The injection consisted 1ml of Kenalog (40mg per 1ml) with 8ml 1% lidocaine plain. The patient tolerated the injection well, and there were no complications. The injection site was covered with a Band-Aid. The injection was performed by Annamaria Carpio M.D.    BP Readings from Last 1 Encounters:   10/17/18 138/80       BP noted to be well controlled today in office.     Return to clinic PRN, may be coming in for another problem soon, or sooner as needed for changes.  Re-x-ray on return: maybe    Annamaria Carpio M.D.          Again, thank you for allowing me to participate in the care of your patient.        Sincerely,        Annamaria Carpio MD

## 2018-10-17 NOTE — PROGRESS NOTES
"Office Visit-Follow up      Chief Complaint: Mya Hui is a 78 year old female who is being seen for   Chief Complaint   Patient presents with     RECHECK     rt shoulder would like inj last inj given on 6/219/18         History of Present Illness:   Last injection in June helped, she would like another today      Past medical history reviewed and there is no significant change    Patient does not use Tobacco products.    REVIEW OF SYSTEMS  General: negative for, night sweats, dizziness, fatigue  Resp: No shortness of breath and no cough  CV: negative for chest pain, syncope or near-syncope  GI: negative for nausea, vomiting and diarrhea  : negative for dysuria and hematuria  Musculoskeletal: as above  Neurologic: negative for syncope   Hematologic: negative for bleeding disorder      Physical Exam:  Vitals: /80  Ht 1.651 m (5' 5\")  Wt 89.8 kg (198 lb)  BMI 32.95 kg/m2  BMI= Body mass index is 32.95 kg/(m^2).  Constitutional: healthy, alert and no acute distress   Psychiatric: mentation appears normal and affect normal/bright  NEURO: no focal deficits  RESP: Normal with easy respirations and no use of accessory muscles to breathe, no audible wheezing or retractions  CV: No peripheral edema  SKIN: No erythema, rashes, excoriation, or breakdown. No evidence of infection.   JOINT/EXTREMITIES:right shoulder - almost FROM, weak abduction and ER, good IT strength, global pain  GAIT: antalgic      Diagnostic Modalities:  None today.        Impression: right Rotator cuff arthropathy    Plan:  All of the above pertinent physical exam and imaging modalities findings was reviewed with Mya.                                          INJECTION PROCEDURE:  The patient was counseled about an  injection, including discussion of risks (including infection), contents of the injection, rationale for performing the injection, and expected benefits of the injection. The skin was prepped with alcohol and betadine and " then utilizing sterile technique an injection of the right shoulder subacromial space from the anterolateral approach  was performed. The injection consisted 1ml of Kenalog (40mg per 1ml) with 8ml 1% lidocaine plain. The patient tolerated the injection well, and there were no complications. The injection site was covered with a Band-Aid. The injection was performed by Annamaria Carpio M.D.    BP Readings from Last 1 Encounters:   10/17/18 138/80       BP noted to be well controlled today in office.     Return to clinic PRN, may be coming in for another problem soon, or sooner as needed for changes.  Re-x-ray on return: maybe    Annamaria Carpio M.D.

## 2018-10-18 NOTE — PROGRESS NOTES
"Mya Hui is a 78 year old female who is seen in consultation at the request of .  History of Present illness:  Mya presents for evaluation of:  1.) rt hip pain   Onset: Months ago  Symptoms brought on by: nothing.   Character:  dull ache.    Progression of symptoms:  same.    Previous similar pain: no .   Pain Level:  4/10.   Previous treatments:  exercises.  Currently on Blood thinners? No  Diagnosis of Diabetes? Yes      /87 (BP Location: Left arm, Patient Position: Sitting, Cuff Size: Adult Large)  Temp 97.7  F (36.5  C) (Temporal)  Ht 1.651 m (5' 5\")  Wt 90 kg (198 lb 6.4 oz)  BMI 33.02 kg/m2  Body mass index is 33.02 kg/(m^2).  5' 5\"  198 lbs 6.4 oz    Liv Lawrence, [facility]      Office Visit-Follow up    I spoke with the pt and did a quick exam - he pain is from her low back.  I recommend seeing Dr. Cohen instead.    "

## 2018-10-19 ENCOUNTER — OFFICE VISIT (OUTPATIENT)
Dept: ORTHOPEDICS | Facility: CLINIC | Age: 78
End: 2018-10-19
Payer: COMMERCIAL

## 2018-10-19 ENCOUNTER — RADIANT APPOINTMENT (OUTPATIENT)
Dept: GENERAL RADIOLOGY | Facility: CLINIC | Age: 78
End: 2018-10-19
Attending: ORTHOPAEDIC SURGERY
Payer: COMMERCIAL

## 2018-10-19 VITALS
BODY MASS INDEX: 33.05 KG/M2 | HEIGHT: 65 IN | WEIGHT: 198.4 LBS | DIASTOLIC BLOOD PRESSURE: 87 MMHG | SYSTOLIC BLOOD PRESSURE: 166 MMHG | TEMPERATURE: 97.7 F

## 2018-10-19 DIAGNOSIS — M25.551 HIP PAIN, RIGHT: ICD-10-CM

## 2018-10-19 DIAGNOSIS — M25.551 HIP PAIN, RIGHT: Primary | ICD-10-CM

## 2018-10-19 PROCEDURE — 73502 X-RAY EXAM HIP UNI 2-3 VIEWS: CPT | Mod: TC

## 2018-10-19 PROCEDURE — 99207 ZZC NO BILLABLE SERVICE THIS VISIT: CPT | Performed by: ORTHOPAEDIC SURGERY

## 2018-10-19 ASSESSMENT — PAIN SCALES - GENERAL: PAINLEVEL: MODERATE PAIN (4)

## 2018-10-19 NOTE — PROGRESS NOTES
Appropriate assistive devices provided during their visit. yes (Yes, No, N/A) walker (list device)    Exam table and/or cart  placed in the lowest position. yes (Yes, No, N/A)    Brakes on tables/carts/wheelchairs used at all times. yes (Yes, No, N/A)    Non slip footwear applied. na (Yes, No, NA)    Patient was accompanied by staff throughout visit. yes (Yes, No, N/A)    Equipment safety straps used. na (Yes, No, N/A)    Assist with toileting. na (Yes, No, N/A)  Ngoc Jones  10/19/2018  10:07 AM

## 2018-10-19 NOTE — MR AVS SNAPSHOT
"              After Visit Summary   10/19/2018    Mya Hui    MRN: [id number]           Patient Information     Date Of Birth          1940        Visit Information        Provider Department      10/19/2018 10:00 AM Annamaria Carpio MD [facility]        Today's Diagnoses     Hip pain, right    -  1       Follow-ups after your visit        Your next 10 appointments already scheduled     Oct 25, 2018 10:20 AM CDT   [facility] Visit with Nereyda Cohen MD   [facility]    [address]  [place] [address]   [phone]            Nov 27, 2018  8:30 AM CST   New Visit with Efren Osorio MD   [facility]    [address] [place] [id number] [phone]              Who to contact     If you have questions or need follow up information about today's clinic visit or your schedule please contact [facility] directly at [phone].  Normal or non-critical lab and imaging results will be communicated to you by [employer], letter or phone within 4 business days after the clinic has received the results. If you do not hear from us within 7 days, please contact the clinic through [employer] or phone. If you have a critical or abnormal lab result, we will notify you by phone as soon as possible.  Submit refill requests through [employer] or call your pharmacy and they will forward the refill request to us. Please allow 3 business days for your refill to be completed.          Additional Information About Your Visit        [employer] Information     [employer] lets you send messages to your doctor, view your test results, renew your prescriptions, schedule appointments and more. To sign up, go to [email or web address] . Click on \"Log in\" on the left side of the screen, which will take you to the Welcome page. Then click on \"Sign up Now\" on the right " "side of the page.     You will be asked to enter the access code listed below, as well as some personal information. Please follow the directions to create your username and password.     Your access code is: [email or web address]  Expires: 2018 12:00 PM     Your access code will  in 90 days. If you need help or a new code, please call your [place] clinic or [phone].        Care EveryWhere ID     This is your Care EveryWhere ID. This could be used by other organizations to access your [place] medical records  [id number]        Your Vitals Were     Temperature Height BMI (Body Mass Index)             97.7  F (36.5  C) (Temporal) 1.651 m (5' 5\") 33.02 kg/m2          Blood Pressure from Last 3 Encounters:   10/19/18 166/87   10/17/18 138/80   10/10/18 132/72    Weight from Last 3 Encounters:   10/19/18 90 kg (198 lb 6.4 oz)   10/17/18 89.8 kg (198 lb)   10/10/18 89.1 kg (196 lb 6.4 oz)               Primary Care Provider Office Phone # Fax #    Chanell MAYI [facility] [phone]        [address] [place] [address]        Equal Access to Services     JANETTE RIBEIRO AH: Hadii herbert ku hadasho Soomaali, waaxda luqadaha, qaybta kaalmada adeegyada, ct aguirre hayramon johnson . So [facility] [phone].    ATENCIÓN: Si habla español, tiene a pedro disposición servicios gratuitos de asistencia lingüística. Llame al [phone].    We comply with applicable federal civil rights laws and [place] laws. We do not discriminate on the basis of race, color, national origin, age, disability, sex, sexual orientation, or gender identity.            Thank you!     Thank you for choosing [facility]  for your care. Our goal is always to provide you with excellent care. Hearing back from our patients is one way we can continue to improve our services. Please take a few minutes to complete the written survey that you may receive in the mail after your visit with us. Thank you!      "        Your Updated Medication List - Protect others around you: Learn how to safely use, store and throw away your medicines at www.disposemymeds.org.          This list is accurate as of 10/19/18 12:17 PM.  Always use your most recent med list.                   Brand Name Dispense Instructions for use Diagnosis    ACCU-CHEK COMPACT PLUS test strip   Generic drug:  blood glucose monitoring     102 each    USE TO TEST BLOOD BLOOD SUGARS ONCE DAILY OR AS DIRECTED    Type 2 diabetes mellitus without complication, without long-term current use of insulin (H)       acetaminophen 325 MG tablet    TYLENOL    100 tablet    Take 2 tablets (650 mg) by mouth every 4 hours as needed for mild pain        amitriptyline 25 MG tablet    ELAVIL    90 tablet    TAKE ONE TABLET BY MOUTH ONCE DAILY AT BEDTIME    Insomnia       blood glucose lancets standard    no brand specified    1 Box    Glucose test strips Use to test blood sugar 1 times daily or as directed.    Type 2 diabetes mellitus without complication, without long-term current use of insulin (H)       blood glucose monitoring lancets     100 each    USE TO TEST BLOOD SUGARS ONCE DAILY OR AS DIRECTED    Type 2 diabetes mellitus without complication, without long-term current use of insulin (H)       levothyroxine 75 MCG tablet    SYNTHROID/LEVOTHROID    60 tablet    Take 1 tablet (75 mcg) by mouth daily    Hypothyroidism, unspecified type       lisinopril 20 MG tablet    PRINIVIL/ZESTRIL    60 tablet    Take 1 tablet (20 mg) by mouth daily    Essential hypertension with goal blood pressure less than 140/90       metFORMIN 500 MG 24 hr tablet    GLUCOPHAGE-XR    270 tablet    ONE TABLET BY MOUTH EVERY MORNING AND TAKE TWO TABLETS BY MOUTH EVERY EVENING WITH MEALS    Type 2 diabetes mellitus without complication, without long-term current use of insulin (H)       MULTIVITAL PO      Take  by mouth. daily        oxybutynin 5 MG tablet    DITROPAN    240 tablet    TAKE TWO  TABLETS BY MOUTH TWICE A DAY AS NEEDED BLADDER SPASMS    OAB (overactive bladder)       pantoprazole 40 MG EC tablet    PROTONIX    180 tablet    Take 1 tablet (40 mg) by mouth 2 times daily Take 30-60 minutes before a meal.    Gastroesophageal reflux disease, esophagitis presence not specified       potassium chloride 10 MEQ tablet    K-TAB,KLOR-CON    90 tablet    TAKE ONE TABLET BY MOUTH THREE TIMES A DAY    Hypokalemia       pravastatin 40 MG tablet    PRAVACHOL    30 tablet    TAKE ONE TABLET BY MOUTH ONCE DAILY    Hyperlipidemia with target LDL less than 100

## 2018-10-19 NOTE — LETTER
"    10/19/2018         RE: Mya Hui  [address]  [place] [address]        Dear Colleague,    Thank you for referring your patient, Mya Hui, to the [facility]. Please see a copy of my visit note below.    Mya Hui is a 78 year old female who is seen in consultation at the request of .  History of Present illness:  Mya presents for evaluation of:  1.) rt hip pain   Onset: Months ago  Symptoms brought on by: nothing.   Character:  dull ache.    Progression of symptoms:  same.    Previous similar pain: no .   Pain Level:  4/10.   Previous treatments:  exercises.  Currently on Blood thinners? No  Diagnosis of Diabetes? Yes      /87 (BP Location: Left arm, Patient Position: Sitting, Cuff Size: Adult Large)  Temp 97.7  F (36.5  C) (Temporal)  Ht 1.651 m (5' 5\")  Wt 90 kg (198 lb 6.4 oz)  BMI 33.02 kg/m2  Body mass index is 33.02 kg/(m^2).  5' 5\"  198 lbs 6.4 oz    Liv Lawrence CMA      Office Visit-Follow up    I spoke with the pt and did a quick exam - he pain is from her low back.  I recommend seeing Dr. Cohen instead.      Again, thank you for allowing me to participate in the care of your patient.        Sincerely,        Annamaria Carpio MD    "

## 2018-10-29 ENCOUNTER — TELEPHONE (OUTPATIENT)
Dept: FAMILY MEDICINE | Facility: CLINIC | Age: 78
End: 2018-10-29

## 2018-10-29 NOTE — TELEPHONE ENCOUNTER
Reason for Call:  Other BP update    Detailed comments: 10/11- 142/81, 10/17- 130/76. Pt was sick with flu sx last week and it was around 122/76 during that time. She is feeling much better now- 124/73 today.     Phone Number Patient can be reached at: Home number on file [phone] (home)    Best Time: any     Can we leave a detailed message on this number? YES    Call taken on 10/29/2018 at 2:17 PM by Annamaria Stark

## 2018-11-14 DIAGNOSIS — G47.00 INSOMNIA, UNSPECIFIED TYPE: Primary | ICD-10-CM

## 2018-11-14 NOTE — TELEPHONE ENCOUNTER
"amitriptyline  Last Written Prescription Date:  8/20/2018  Last Fill Quantity: 90,  # refills: 0   Last office visit: 10/10/2018 with prescribing provider:      Future Office Visit:      Requested Prescriptions   Pending Prescriptions Disp Refills     amitriptyline (ELAVIL) 25 MG tablet [Pharmacy Med Name: AMITRIPTYLINE HCL 25MG TABS] 90 tablet 0     Sig: TAKE ONE TABLET BY MOUTH AT BEDTIME    Tricyclic Agents ( Annual appt and no PHQ9) Failed    11/14/2018  1:02 AM       Failed - Blood Pressure under 140/90 in past 12 mos    BP Readings from Last 3 Encounters:   10/19/18 166/87   10/17/18 138/80   10/10/18 132/72                Passed - Recent (12 mo) or future (30 days) visit within authorizing provider's specialty    Patient had office visit in the last 12 months or has a visit in the next 30 days with authorizing provider or within the authorizing provider's specialty.  See \"Patient Info\" tab in inbasket, or \"Choose Columns\" in Meds & Orders section of the refill encounter.             Passed - Patient is age 18 or older       Passed - Patient is not pregnant       Passed - No positive pregnancy test on record in past 12 mos          Routing refill request to provider for review/approval because:  /87  Dorina Johns RN on 11/14/2018 at 5:08 PM    "

## 2018-11-27 ENCOUNTER — OFFICE VISIT (OUTPATIENT)
Dept: GASTROENTEROLOGY | Facility: CLINIC | Age: 78
End: 2018-11-27
Attending: NURSE PRACTITIONER
Payer: COMMERCIAL

## 2018-11-27 VITALS
HEART RATE: 70 BPM | DIASTOLIC BLOOD PRESSURE: 94 MMHG | OXYGEN SATURATION: 96 % | HEIGHT: 65 IN | SYSTOLIC BLOOD PRESSURE: 183 MMHG | BODY MASS INDEX: 32.12 KG/M2 | WEIGHT: 192.8 LBS

## 2018-11-27 DIAGNOSIS — R19.7 DIARRHEA, UNSPECIFIED TYPE: Primary | ICD-10-CM

## 2018-11-27 LAB
ALBUMIN SERPL-MCNC: 3.7 G/DL (ref 3.4–5)
ALP SERPL-CCNC: 54 U/L (ref 40–150)
ALT SERPL W P-5'-P-CCNC: 26 U/L (ref 0–50)
AST SERPL W P-5'-P-CCNC: 21 U/L (ref 0–45)
BILIRUB DIRECT SERPL-MCNC: 0.1 MG/DL (ref 0–0.2)
BILIRUB SERPL-MCNC: 0.4 MG/DL (ref 0.2–1.3)
CRP SERPL-MCNC: <2.9 MG/L (ref 0–8)
PROT SERPL-MCNC: 6.6 G/DL (ref 6.8–8.8)

## 2018-11-27 PROCEDURE — 99204 OFFICE O/P NEW MOD 45 MIN: CPT | Performed by: INTERNAL MEDICINE

## 2018-11-27 PROCEDURE — 36415 COLL VENOUS BLD VENIPUNCTURE: CPT | Performed by: INTERNAL MEDICINE

## 2018-11-27 PROCEDURE — 83516 IMMUNOASSAY NONANTIBODY: CPT | Mod: 59 | Performed by: INTERNAL MEDICINE

## 2018-11-27 PROCEDURE — 83516 IMMUNOASSAY NONANTIBODY: CPT | Performed by: INTERNAL MEDICINE

## 2018-11-27 PROCEDURE — 86140 C-REACTIVE PROTEIN: CPT | Performed by: INTERNAL MEDICINE

## 2018-11-27 PROCEDURE — 80076 HEPATIC FUNCTION PANEL: CPT | Performed by: INTERNAL MEDICINE

## 2018-11-27 ASSESSMENT — PAIN SCALES - GENERAL: PAINLEVEL: NO PAIN (0)

## 2018-11-27 NOTE — MR AVS SNAPSHOT
After Visit Summary   11/27/2018    Mya Hui    MRN: [id number]           Patient Information     Date Of Birth          1940        Visit Information        Provider Department      11/27/2018 8:30 AM Efren Osorio MD [facility]        Today's Diagnoses     Diarrhea, unspecified type    -  1      Care Instructions    Obtain labs and stool tests    Start metamucil wafers        Use imodium as needed.  But if daily loose stools, ok to take 1 tab daily.          Follow-ups after your visit        Follow-up notes from your care team     Return in about 2 months (around 1/27/2019).      Your next 10 appointments already scheduled     Feb 05, 2019 10:00 AM CST   Return Visit with Efren Osorio MD   [facility] ([facility]    [address] [place] [id number] [phone]              Future tests that were ordered for you today     Open Future Orders        Priority Expected Expires Ordered    Tissue transglutaminase chun IgA and IgG Routine  11/27/2019 11/27/2018    CRP inflammation Routine  11/27/2019 11/27/2018    Hepatic panel Routine  11/27/2019 11/27/2018    Calprotectin Feces Routine  11/27/2019 11/27/2018            Who to contact     If you have questions or need follow up information about today's clinic visit or your schedule please contact [facility] directly at [phone].  Normal or non-critical lab and imaging results will be communicated to you by MyChart, letter or phone within 4 business days after the clinic has received the results. If you do not hear from us within 7 days, please contact the clinic through MyChart or phone. If you have a critical or abnormal lab result, we will notify you by phone as soon as possible.  Submit refill requests through Paybubble or call your pharmacy and they will forward the refill request to us. Please allow 3 business days for  "your refill to be completed.          Additional Information About Your Visit        APX Grouphart Information     Kismett is an electronic gateway that provides easy, online access to your medical records. With NewsMaven, you can request a clinic appointment, read your test results, renew a prescription or communicate with your care team.     To sign up for NewsMaven visit the website at [email or web address]   You will be asked to enter the access code listed below, as well as some personal information. Please follow the directions to create your username and password.     Your access code is: [email or web address]  Expires: 2018 11:00 AM     Your access code will  in 90 days. If you need help or a new code, please contact your [facility] or call [phone] for assistance.        Care EveryWhere ID     This is your Care EveryWhere ID. This could be used by other organizations to access your [facility] medical records  [id number]        Your Vitals Were     Pulse Height Pulse Oximetry BMI (Body Mass Index)          70 1.651 m (5' 5\") 96% 32.08 kg/m2         Blood Pressure from Last 3 Encounters:   18 (!) 183/94   10/19/18 166/87   10/17/18 138/80    Weight from Last 3 Encounters:   18 87.5 kg (192 lb 12.8 oz)   10/19/18 90 kg (198 lb 6.4 oz)   10/17/18 89.8 kg (198 lb)               Primary Care Provider Office Phone # Fax #    Chanell Nelson, [facility] [phone]       [address] [place] [address]        Equal Access to Services     WILDA RIBEIRO : Hadii herbert Call, waaxda luqadaha, qaybta kaalmact bob. So [facility] [phone].    ATENCIÓN: Si habla español, tiene a pedro disposición servicios gratuitos de asistencia lingüística. Llame al [phone].    We comply with applicable federal civil rights laws and [place] laws. We do not discriminate on the basis of race, color, national " origin, age, disability, sex, sexual orientation, or gender identity.            Thank you!     Thank you for choosing [facility]  for your care. Our goal is always to provide you with excellent care. Hearing back from our patients is one way we can continue to improve our services. Please take a few minutes to complete the written survey that you may receive in the mail after your visit with us. Thank you!             Your Updated Medication List - Protect others around you: Learn how to safely use, store and throw away your medicines at www.disposemymeds.org.          This list is accurate as of 11/27/18  9:17 AM.  Always use your most recent med list.                   Brand Name Dispense Instructions for use Diagnosis    ACCU-CHEK COMPACT PLUS test strip   Generic drug:  blood glucose monitoring     102 each    USE TO TEST BLOOD BLOOD SUGARS ONCE DAILY OR AS DIRECTED    Type 2 diabetes mellitus without complication, without long-term current use of insulin (H)       acetaminophen 325 MG tablet    TYLENOL    100 tablet    Take 2 tablets (650 mg) by mouth every 4 hours as needed for mild pain        amitriptyline 25 MG tablet    ELAVIL    90 tablet    TAKE ONE TABLET BY MOUTH AT BEDTIME    Insomnia, unspecified type       blood glucose lancets standard    no brand specified    1 Box    Glucose test strips Use to test blood sugar 1 times daily or as directed.    Type 2 diabetes mellitus without complication, without long-term current use of insulin (H)       blood glucose monitoring lancets     100 each    USE TO TEST BLOOD SUGARS ONCE DAILY OR AS DIRECTED    Type 2 diabetes mellitus without complication, without long-term current use of insulin (H)       levothyroxine 75 MCG tablet    SYNTHROID/LEVOTHROID    60 tablet    Take 1 tablet (75 mcg) by mouth daily    Hypothyroidism, unspecified type       lisinopril 20 MG tablet    PRINIVIL/ZESTRIL    60 tablet    Take 1 tablet (20 mg) by mouth daily     Essential hypertension with goal blood pressure less than 140/90       metFORMIN 500 MG 24 hr tablet    GLUCOPHAGE-XR    270 tablet    ONE TABLET BY MOUTH EVERY MORNING AND TAKE TWO TABLETS BY MOUTH EVERY EVENING WITH MEALS    Type 2 diabetes mellitus without complication, without long-term current use of insulin (H)       MULTIVITAL PO      Take  by mouth. daily        oxybutynin 5 MG tablet    DITROPAN    240 tablet    TAKE TWO TABLETS BY MOUTH TWICE A DAY AS NEEDED BLADDER SPASMS    OAB (overactive bladder)       pantoprazole 40 MG EC tablet    PROTONIX    180 tablet    Take 1 tablet (40 mg) by mouth 2 times daily Take 30-60 minutes before a meal.    Gastroesophageal reflux disease, esophagitis presence not specified       potassium chloride 10 MEQ tablet    K-TAB,KLOR-CON    90 tablet    TAKE ONE TABLET BY MOUTH THREE TIMES A DAY    Hypokalemia       pravastatin 40 MG tablet    PRAVACHOL    30 tablet    TAKE ONE TABLET BY MOUTH ONCE DAILY    Hyperlipidemia with target LDL less than 100

## 2018-11-27 NOTE — PROGRESS NOTES
GASTROENTEROLOGY NEW PATIENT CLINIC VISIT    CC/REFERRING MD:    Chanell Nelson    REASON FOR CONSULTATION:   Chanell Nelson for   Chief Complaint   Patient presents with     Consult     Chronic diarrhea        HISTORY OF PRESENT ILLNESS:    Mya Hui is 78 year old female who presents for evaluation of chronic loose stools.  She reports 2-1/2 years of chronic loose stools approximately 2-3 bowel movements per day which are described as loose to watery.  She reports that she is unable to travel because of this issue.  She is not having any weight loss.  She does have fecal urgency and has had occasional incontinence episodes.  She does not get abdominal pain associated.  She does not have constipation.  She has had noninvasive evaluation including C. difficile testing, enteric pathogen testing and Giardia testing which were negative.  She has used Imodium occasionally without significant relief.  History is notable for cholecystectomy in April 2018 secondary to cholecystitis.  She reports that her symptoms predate this.  She is also noted to be on metformin which is long-standing.  She has undergone colonoscopy in 2013 which was normal.  Family history is negative for GI malignancy.  She does not use NSAIDs or drink alcohol.    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     Priority: Medium     Cherry angioma 04/03/2018     Priority: Medium     Gastroesophageal reflux disease, esophagitis presence not specified 09/14/2017     Priority: Medium     Sliding hiatal hernia 09/11/2017     Priority: Medium     Calculus of gallbladder without cholecystitis 09/11/2017     Priority: Medium     Rotator cuff arthropathy, right 08/28/2017     Priority: Medium     Chronic right shoulder pain 08/10/2017     Priority: Medium     Right shoulder pain, unspecified chronicity 08/10/2017      Priority: Medium     Dry mouth 08/10/2017     Priority: Medium     Primary osteoarthritis involving multiple joints 06/16/2017     Priority: Medium     Cataract, bilateral 03/30/2017     Priority: Medium     Type 2 diabetes mellitus without complication, without long-term current use of insulin (H) 02/16/2017     Priority: Medium     Hypothyroidism, unspecified type 02/14/2017     Priority: Medium     Insomnia, unspecified 02/14/2017     Priority: Medium     OAB (overactive bladder) 09/07/2016     Priority: Medium     Essential hypertension with goal blood pressure less than 140/90 09/07/2016     Priority: Medium     Osteoarthritis of hip 08/21/2014     Priority: Medium     Do you wish to do the replacement in the background? yes         DDD (degenerative disc disease), lumbar 08/21/2014     Priority: Medium     Hearing aid worn 05/30/2014     Priority: Medium     Right ear       Hip pain 09/27/2013     Priority: Medium     Sciatica of right side 06/28/2013     Priority: Medium     Asymptomatic carotid artery stenosis 05/02/2013     Priority: Medium     Hyperlipidemia with target LDL less than 100      Priority: Medium     Diagnosis updated by automated process. Provider to review and confirm.       History of renal calculi      Priority: Medium     Nevus 04/10/2013     Priority: Medium     Do you wish to do the replacement in the background? yes         Advance Care Planning 04/04/2013     Priority: Medium     Advance Care Planning:   Receipt of ACP document:  Received: Health Care Directive which was witnessed or notarized on 3/18/10.  Document not previously scanned.  Validation form completed and sent with document to be scanned.  Code Status reflects choices in most recent ACP document.  Confirmed/documented designated decision maker(s). See permanent comments section of demographics in clinical tab. View document(s) and details by clicking on code status.   Added by Jolie Wagner on 4/23/2014.                    Arthritis      Priority: Medium       PERTINENT PAST MEDICAL HISTORY:  (I personally reviewed this history with the patient at today's visit)   Past Medical History:   Diagnosis Date     Acute cholecystitis 3/23/2018     Acute kidney injury (H) 9/10/2017     Arthritis      Dehydration 9/4/2017     Diabetes type 2, controlled (H)      Elevated lactic acid level 9/10/2017     GERD (gastroesophageal reflux disease)      History of renal calculi      HTN, goal below 140/90      Hyperlipidaemia LDL goal < 100      Hypokalemia 9/3/2017     Hypothyroid      Insomnia      Left carotid stenosis      Migraine      Sciatica of right side 6/28/2013         PREVIOUS SURGERIES: (I personally reviewed this history with the patient at today's visit)   Past Surgical History:   Procedure Laterality Date     BUNIONECTOMY      Right foot     CATARACT IOL, RT/LT Bilateral 2017     COLONOSCOPY  7/12/2013    Procedure: COLONOSCOPY;  Colonoscopy;  Surgeon: Giovany Espinoza MD;  Location: [facility]     FRACTURE TX, ANKLE RT/LT      Left ankle     HC CYSTOURETHROSCOPY W/ URETEROSCOPY &/OR PYELOSCOPY; W/ LITHOTRIPSY       HC REVISE MEDIAN N/CARPAL TUNNEL SURG      Right wrist     INJECT JOINT SACROILIAC  4/10/2014    Procedure: INJECT JOINT SACROILIAC;  Sacroiliac Joint Injection;  Surgeon: Gilbert Mcclure MD;  Location: [facility]     LAPAROSCOPIC CHOLECYSTECTOMY N/A 3/24/2018    Procedure: LAPAROSCOPIC CHOLECYSTECTOMY;  Laparoscopic Cholecystectomy;  Surgeon: Berry Allen DO;  Location: [facility]         ALLERGIES:     Allergies   Allergen Reactions     Codeine Nausea     Fentanyl Nausea and Vomiting     VERY sensitive to most narcotics if not all.       PERTINENT MEDICATIONS:    Current Outpatient Prescriptions:      ACCU-CHEK COMPACT PLUS test strip, USE TO TEST BLOOD BLOOD SUGARS ONCE DAILY OR AS DIRECTED, Disp: 102 each, Rfl: 3     acetaminophen (TYLENOL) 325 MG tablet, Take 2 tablets (650 mg) by mouth every 4 hours as needed  for mild pain, Disp: 100 tablet, Rfl:      blood glucose (NO BRAND SPECIFIED) lancets standard, Glucose test strips Use to test blood sugar 1 times daily or as directed., Disp: 1 Box, Rfl: 11     blood glucose monitoring (SOFTCLIX) lancets, USE TO TEST BLOOD SUGARS ONCE DAILY OR AS DIRECTED, Disp: 100 each, Rfl: 11     levothyroxine (SYNTHROID/LEVOTHROID) 75 MCG tablet, Take 1 tablet (75 mcg) by mouth daily, Disp: 60 tablet, Rfl: 0     lisinopril (PRINIVIL/ZESTRIL) 20 MG tablet, Take 1 tablet (20 mg) by mouth daily, Disp: 60 tablet, Rfl: 0     metFORMIN (GLUCOPHAGE-XR) 500 MG 24 hr tablet, ONE TABLET BY MOUTH EVERY MORNING AND TAKE TWO TABLETS BY MOUTH EVERY EVENING WITH MEALS, Disp: 270 tablet, Rfl: 2     Multiple Vitamins-Minerals (MULTIVITAL PO), Take  by mouth. daily, Disp: , Rfl:      oxybutynin (DITROPAN) 5 MG tablet, TAKE TWO TABLETS BY MOUTH TWICE A DAY AS NEEDED BLADDER SPASMS, Disp: 240 tablet, Rfl: 5     pantoprazole (PROTONIX) 40 MG EC tablet, Take 1 tablet (40 mg) by mouth 2 times daily Take 30-60 minutes before a meal., Disp: 180 tablet, Rfl: 2     potassium chloride (K-TAB,KLOR-CON) 10 MEQ tablet, TAKE ONE TABLET BY MOUTH THREE TIMES A DAY, Disp: 90 tablet, Rfl: 11     pravastatin (PRAVACHOL) 40 MG tablet, TAKE ONE TABLET BY MOUTH ONCE DAILY, Disp: 30 tablet, Rfl: 11     amitriptyline (ELAVIL) 25 MG tablet, TAKE ONE TABLET BY MOUTH AT BEDTIME (Patient not taking: Reported on 11/27/2018), Disp: 90 tablet, Rfl: 0    SOCIAL HISTORY:  Social History     Social History     Marital status:      Spouse name: N/A     Number of children: N/A     Years of education: N/A     Occupational History      Retired           's office part time     Social History Main Topics     Smoking status: Never Smoker     Smokeless tobacco: Never Used     Alcohol use Yes     Drug use: No     Sexual activity: Not Currently     Other Topics Concern     Not on file     Social History Narrative       FAMILY HISTORY: (I  "personally reviewed this history with the patient at today's visit)  Family History   Problem Relation Age of Onset     Hypertension Brother      Cerebrovascular Disease Brother      Diabetes Father      Cardiovascular Father      Diabetes Brother      Borderline     Breast Cancer Mother      Diabetes Brother      Blood Disease Brother      Cardiovascular Brother      MI        ROS:    No fevers or chills  No weight loss  No blurry vision, double vision or change in vision  No sore throat  No lymphadenopathy  No headache, paraesthesias, or weakness in a limb  No shortness of breath or wheezing  No chest pain or pressure  No arthralgias or myalgias  No rashes or skin changes  No odynophagia or dysphagia  No BRBPR, hematochezia, melena  No dysuria, frequency or urgency  No hot/cold intolerance or polyria  No anxiety or depression  PHYSICAL EXAMINATION:  Constitutional: aaox3, cooperative, pleasant, not dyspneic/diaphoretic, no acute distress  Vitals reviewed: BP (!) 183/94  Pulse 70  Ht 1.651 m (5' 5\")  Wt 87.5 kg (192 lb 12.8 oz)  SpO2 96%  BMI 32.08 kg/m2  Wt:   Wt Readings from Last 2 Encounters:   11/27/18 87.5 kg (192 lb 12.8 oz)   10/19/18 90 kg (198 lb 6.4 oz)      Eyes: Sclera anicteric/injected  Ears/nose/mouth/throat: Normal oropharynx without ulcers or exudate, mucus membranes moist, hearing intact  Neck: supple, thyroid normal size  CV: No edema, RRR  Respiratory: Unlabored breathing, CTAB  Lymph: No submandibular, supraclavicular or inguinal lymphadenopathy  Abd:  Nondistended, no masses, +bs, no hepatosplenomegaly, nontender, no peritoneal signs  Skin: warm, perfused, no jaundice  Psych: Normal affect  MSK: Normal gait      PERTINENT STUDIES: (I personally reviewed these laboratory studies today)  Most recent CBC:   Recent Labs   Lab Test  03/23/18   2105  09/18/17   1458  09/14/17   1626   WBC  8.1   --   6.2   HGB  13.3  11.6*  10.5*   HCT  41.0   --   34.3*   PLT  225   --   263     Most recent " hepatic panel:  Recent Labs   Lab Test  11/27/18   0935  10/10/18   0752   ALT  26  25   AST  21  18     Most recent creatinine:  Recent Labs   Lab Test  10/10/18   0752  03/23/18   2105   CR  1.11*  0.82       ASSESSMENT/PLAN:    Mya Hui is a 78 year old female who presents for evaluation of chronic loose stools.  She does not have other times of constipation and has no abdominal pain.  Infectious studies were negative.  We will plan to obtain serologic inflammatory markers with CRP as well as fecal inflammatory markers with Protecting.  We will also obtain celiac screen.  We discussed that the differential does include microscopic colitis which can only be diagnosed on colonoscopy with biopsy.  She prefers to avoid another colonoscopy at this time and opted for noninvasive evaluation and conservative therapy which is reasonable.  We will plan to obtain this testing and she can start on daily fiber therapy.  She was also advised that it is fine to schedule Imodium 1 tablet daily if it is not having a satisfactory effect when she is taking it on an as-needed basis.    We will plan to see her back in 2 months in the clinic.  If loose stools continue and the medication suggestions have not achieved response, then it would be reasonable to consider proceeding with a colonoscopy with biopsy.      Diarrhea, unspecified type  Orders Placed This Encounter   Procedures     Tissue transglutaminase chun IgA and IgG     Standing Status:   Future     Number of Occurrences:   1     Standing Expiration Date:   11/27/2019     CRP inflammation     Standing Status:   Future     Number of Occurrences:   1     Standing Expiration Date:   11/27/2019     Hepatic panel     Standing Status:   Future     Number of Occurrences:   1     Standing Expiration Date:   11/27/2019     Calprotectin Feces     Standing Status:   Future     Standing Expiration Date:   11/27/2019         RTC 2 months with Tru    Thank you for this  consultation.  It was a pleasure to participate in the care of this patient; please contact us with any further questions.      This note was created with voice recognition software, and while reviewed for accuracy, typos may remain.     Efren Osorio MD  Adjunct  of Medicine  Division of Gastroenterology, Hepatology and Nutrition  [facility]  [phone]

## 2018-11-27 NOTE — PATIENT INSTRUCTIONS
Obtain labs and stool tests    Start metamucil wafers        Use imodium as needed.  But if daily loose stools, ok to take 1 tab daily.

## 2018-11-27 NOTE — LETTER
Ms.Sharon NERI Hui  [address]   [place] [address]      December 4, 2018    Dear ,    We are writing to inform you of your test results.    Everything within expected range at this time.      Efren Osorio MD     Resulted Orders   Tissue transglutaminase chun IgA and IgG   Result Value Ref Range    Tissue Transglutaminase Antibody IgA <1 <7 U/mL      Comment:      Negative  The tTG-IgA assay has limited utility for patients with decreased levels of   IgA. Screening for celiac disease should include IgA testing to rule out   selective IgA deficiency and to guide selection and interpretation of   serological testing. tTG-IgG testing may be positive in celiac disease   patients with IgA deficiency.      Tissue Transglutaminase Chun IgG <1 <7 U/mL      Comment:      Negative   CRP inflammation   Result Value Ref Range    CRP Inflammation <2.9 0.0 - 8.0 mg/L   Hepatic panel   Result Value Ref Range    Bilirubin Direct 0.1 0.0 - 0.2 mg/dL    Bilirubin Total 0.4 0.2 - 1.3 mg/dL    Albumin 3.7 3.4 - 5.0 g/dL    Protein Total 6.6 (L) 6.8 - 8.8 g/dL    Alkaline Phosphatase 54 40 - 150 U/L    ALT 26 0 - 50 U/L    AST 21 0 - 45 U/L

## 2018-11-28 LAB
TTG IGA SER-ACNC: <1 U/ML
TTG IGG SER-ACNC: <1 U/ML

## 2018-11-29 DIAGNOSIS — R19.7 DIARRHEA, UNSPECIFIED TYPE: ICD-10-CM

## 2018-11-29 PROCEDURE — 83993 ASSAY FOR CALPROTECTIN FECAL: CPT | Performed by: INTERNAL MEDICINE

## 2018-12-04 DIAGNOSIS — E03.9 HYPOTHYROIDISM, UNSPECIFIED TYPE: ICD-10-CM

## 2018-12-04 DIAGNOSIS — I10 ESSENTIAL HYPERTENSION WITH GOAL BLOOD PRESSURE LESS THAN 140/90: ICD-10-CM

## 2018-12-04 LAB — CALPROTECTIN STL-MCNT: 178.5 MG/KG (ref 0–49.9)

## 2018-12-05 RX ORDER — LEVOTHYROXINE SODIUM 75 UG/1
TABLET ORAL
Qty: 60 TABLET | Refills: 8 | Status: SHIPPED | OUTPATIENT
Start: 2018-12-05 | End: 2020-01-20

## 2018-12-05 RX ORDER — LISINOPRIL 20 MG/1
TABLET ORAL
Qty: 60 TABLET | Refills: 0 | Status: SHIPPED | OUTPATIENT
Start: 2018-12-05 | End: 2019-02-02

## 2018-12-05 NOTE — TELEPHONE ENCOUNTER
"levothyroxine  Last Written Prescription Date:  10/10/2018  Last Fill Quantity: 60,  # refills: 0   Last office visit: 10/10/2018 with prescribing provider:      Future Office Visit:   Next 5 appointments (look out 90 days)     Feb 05, 2019 10:00 AM CST   Return Visit with Efren Osorio MD   [facility])    [address] [place] [id number] [phone]                   Requested Prescriptions   Pending Prescriptions Disp Refills     levothyroxine (SYNTHROID/LEVOTHROID) 75 MCG tablet [Pharmacy Med Name: LEVOTHYROXINE 75MCG TAB] 60 tablet 0     Sig: TAKE ONE TABLET BY MOUTH ONCE DAILY    Thyroid Protocol Passed    12/4/2018  1:02 AM       Passed - Patient is 12 years or older       Passed - Recent (12 mo) or future (30 days) visit within the authorizing provider's specialty    Patient had office visit in the last 12 months or has a visit in the next 30 days with authorizing provider or within the authorizing provider's specialty.  See \"Patient Info\" tab in inbasket, or \"Choose Columns\" in Meds & Orders section of the refill encounter.             Passed - Normal TSH on file in past 12 months    Recent Labs   Lab Test  10/10/18   0752   TSH  3.84             Passed - No active pregnancy on record    If patient is pregnant or has had a positive pregnancy test, please check TSH.         Passed - No positive pregnancy test in past 12 months    If patient is pregnant or has had a positive pregnancy test, please check TSH.     Prescription approved per [facility] Refill Protocol.        Lisinopril  Last Written Prescription Date:  10/10/2018  Last Fill Quantity: 60,  # refills: 0   Last office visit: 10/10/2018 with prescribing provider:      Future Office Visit:   Next 5 appointments (look out 90 days)     Feb 05, 2019 10:00 AM CST   Return Visit with Efren Osorio MD   [facility]    [id number] " "[address] [place] [id number] [phone]                      lisinopril (PRINIVIL/ZESTRIL) 20 MG tablet [Pharmacy Med Name: LISINOPRIL 20MG TABS] 60 tablet 0     Sig: TAKE ONE TABLET BY MOUTH ONCE DAILY    ACE Inhibitors (Including Combos) Protocol Failed    12/4/2018  1:02 AM       Failed - Blood pressure under 140/90 in past 12 months    BP Readings from Last 3 Encounters:   11/27/18 (!) 183/94   10/19/18 166/87   10/17/18 138/80          Failed - Normal serum creatinine on file in past 12 months    Recent Labs   Lab Test  10/10/18   0752   CR  1.11*          Passed - Recent (12 mo) or future (30 days) visit within the authorizing provider's specialty    Patient had office visit in the last 12 months or has a visit in the next 30 days with authorizing provider or within the authorizing provider's specialty.  See \"Patient Info\" tab in inbasket, or \"Choose Columns\" in Meds & Orders section of the refill encounter.         Passed - Patient is age 18 or older       Passed - No active pregnancy on record       Passed - Normal serum potassium on file in past 12 months    Recent Labs   Lab Test  10/10/18   0752   POTASSIUM  3.7          Passed - No positive pregnancy test in past 12 months          Routing refill request to provider for review/approval because:  Labs out of range:  Cr 1.11, /94          Dorina Johns RN on 12/5/2018 at 2:40 PM    "

## 2018-12-06 ENCOUNTER — RADIANT APPOINTMENT (OUTPATIENT)
Dept: GENERAL RADIOLOGY | Facility: OTHER | Age: 78
End: 2018-12-06
Attending: PHYSICAL MEDICINE & REHABILITATION
Payer: COMMERCIAL

## 2018-12-06 ENCOUNTER — OFFICE VISIT (OUTPATIENT)
Dept: ORTHOPEDICS | Facility: OTHER | Age: 78
End: 2018-12-06
Payer: COMMERCIAL

## 2018-12-06 VITALS
BODY MASS INDEX: 32.12 KG/M2 | DIASTOLIC BLOOD PRESSURE: 86 MMHG | HEIGHT: 65 IN | WEIGHT: 192.8 LBS | SYSTOLIC BLOOD PRESSURE: 132 MMHG

## 2018-12-06 DIAGNOSIS — M54.41 CHRONIC RIGHT-SIDED LOW BACK PAIN WITH RIGHT-SIDED SCIATICA: Primary | ICD-10-CM

## 2018-12-06 DIAGNOSIS — G89.29 CHRONIC RIGHT-SIDED LOW BACK PAIN WITH RIGHT-SIDED SCIATICA: ICD-10-CM

## 2018-12-06 DIAGNOSIS — M54.41 CHRONIC RIGHT-SIDED LOW BACK PAIN WITH RIGHT-SIDED SCIATICA: ICD-10-CM

## 2018-12-06 DIAGNOSIS — G89.29 CHRONIC RIGHT-SIDED LOW BACK PAIN WITH RIGHT-SIDED SCIATICA: Primary | ICD-10-CM

## 2018-12-06 DIAGNOSIS — M51.369 LUMBAR DEGENERATIVE DISC DISEASE: ICD-10-CM

## 2018-12-06 PROCEDURE — 99204 OFFICE O/P NEW MOD 45 MIN: CPT | Performed by: PHYSICAL MEDICINE & REHABILITATION

## 2018-12-06 PROCEDURE — 72100 X-RAY EXAM L-S SPINE 2/3 VWS: CPT

## 2018-12-06 NOTE — LETTER
12/6/2018         RE: Mya Hui  [address]  [place] [address]        Dear Colleague,    Thank you for referring your patient, Mya Hui, to the [facility]. Please see a copy of my visit note below.    Sports Medicine Clinic Visit    PCP: Chanell Nelson    CC: Patient presents with:  Lower Back - Pain      HPI:  Mya Hui is a 78 year old female who is seen in consultation at the request of Dr. Carpio.   She notes right sided low back pain with radiating to the right leg with gradual onset. She notes that ever since her move into her apartment her back pain since to have gotten worse. She notes right sided low back pain with radiating pain in the right leg. She rates the pain at a  10/10 at its worst and a 2/10 currently.  Symptoms are relieved with nothing however better if she is not moving. Symptoms are worsened by prolonged walking. She endorses weakness in the right leg.   She denies swelling, bruising, popping, grinding, catching, locking, instability, numbness and tingling.  Other treatment has included Tylenol. In the past she has had ESIs and home care PT for back pain however no treatment recently.       Review of Systems:  Musculoskeletal: as above  Remainder of review of systems is negative including constitutional, eyes, ENT, CV, pulmonary, GI, , endocrine, skin, hematologic, and neurologic except as noted in HPI or medical history.    History reviewed. No pertinent past surgical/medical/family/social history other than as mentioned in HPI.    Patient Active Problem List   Diagnosis     Advance Care Planning     Arthritis     Nevus     Asymptomatic carotid artery stenosis     Hyperlipidemia with target LDL less than 100     History of renal calculi     Sciatica of right side     Hip pain     Hearing aid worn     Osteoarthritis of hip     DDD (degenerative disc disease), lumbar     OAB (overactive bladder)     Essential hypertension with  goal blood pressure less than 140/90     Hypothyroidism, unspecified type     Insomnia, unspecified     Type 2 diabetes mellitus without complication, without long-term current use of insulin (H)     Cataract, bilateral     Primary osteoarthritis involving multiple joints     Chronic right shoulder pain     Right shoulder pain, unspecified chronicity     Dry mouth     Rotator cuff arthropathy, right     Sliding hiatal hernia     Calculus of gallbladder without cholecystitis     Gastroesophageal reflux disease, esophagitis presence not specified     S/P laparoscopic cholecystectomy     Toe anomaly     Cherry angioma     Past Medical History:   Diagnosis Date     Acute cholecystitis 3/23/2018     Acute kidney injury (H) 9/10/2017     Arthritis      Dehydration 9/4/2017     Diabetes type 2, controlled (H)      Elevated lactic acid level 9/10/2017     GERD (gastroesophageal reflux disease)      History of renal calculi      HTN, goal below 140/90      Hyperlipidaemia LDL goal < 100      Hypokalemia 9/3/2017     Hypothyroid      Insomnia      Left carotid stenosis      Migraine      Sciatica of right side 6/28/2013     Past Surgical History:   Procedure Laterality Date     BUNIONECTOMY      Right foot     CATARACT IOL, RT/LT Bilateral 2017     COLONOSCOPY  7/12/2013    Procedure: COLONOSCOPY;  Colonoscopy;  Surgeon: Giovany Espinoza MD;  Location: [facility]     FRACTURE TX, ANKLE RT/LT      Left ankle     HC CYSTOURETHROSCOPY W/ URETEROSCOPY &/OR PYELOSCOPY; W/ LITHOTRIPSY       HC REVISE MEDIAN N/CARPAL TUNNEL SURG      Right wrist     INJECT JOINT SACROILIAC  4/10/2014    Procedure: INJECT JOINT SACROILIAC;  Sacroiliac Joint Injection;  Surgeon: Gilbert Mcclure MD;  Location: [facility]     LAPAROSCOPIC CHOLECYSTECTOMY N/A 3/24/2018    Procedure: LAPAROSCOPIC CHOLECYSTECTOMY;  Laparoscopic Cholecystectomy;  Surgeon: Berry Allen DO;  Location: [facility]     Family History   Problem Relation Age of Onset      "Hypertension Brother      Cerebrovascular Disease Brother      Diabetes Father      Cardiovascular Father      Diabetes Brother      Borderline     Breast Cancer Mother      Diabetes Brother      Blood Disease Brother      Cardiovascular Brother      MI     Social History     Social History     Marital status:      Spouse name: N/A     Number of children: N/A     Years of education: N/A     Occupational History      Retired           's office part time     Social History Main Topics     Smoking status: Never Smoker     Smokeless tobacco: Never Used     Alcohol use Yes     Drug use: No     Sexual activity: Not Currently     Other Topics Concern     Not on file     Social History Narrative       She lives in an apartment by herself    Current Outpatient Prescriptions   Medication     ACCU-CHEK COMPACT PLUS test strip     acetaminophen (TYLENOL) 325 MG tablet     amitriptyline (ELAVIL) 25 MG tablet     blood glucose (NO BRAND SPECIFIED) lancets standard     blood glucose monitoring (SOFTCLIX) lancets     levothyroxine (SYNTHROID/LEVOTHROID) 75 MCG tablet     lisinopril (PRINIVIL/ZESTRIL) 20 MG tablet     metFORMIN (GLUCOPHAGE-XR) 500 MG 24 hr tablet     Multiple Vitamins-Minerals (MULTIVITAL PO)     oxybutynin (DITROPAN) 5 MG tablet     pantoprazole (PROTONIX) 40 MG EC tablet     potassium chloride (K-TAB,KLOR-CON) 10 MEQ tablet     pravastatin (PRAVACHOL) 40 MG tablet     No current facility-administered medications for this visit.      Allergies   Allergen Reactions     Codeine Nausea     Fentanyl Nausea and Vomiting     VERY sensitive to most narcotics if not all.         Objective:  /86  Ht 5' 5\" (1.651 m)  Wt 192 lb 12.8 oz (87.5 kg)  BMI 32.08 kg/m2    General: Alert and in no distress    Head: Normocephalic, atraumatic  Eyes: no scleral icterus or conjunctival erythema   Oropharynx:  Mucous membranes moist  Skin: no erythema, petechiae, or jaundice  Resp: normal respiratory effort without " conversational dyspnea   Psych: normal mood and affect    Gait: Uses a four wheeled walker, short steps walking without the walker  Neuro: Motor strength and sensation as noted below    Musculoskeletal:    Low back exam:    Inspection:            normal skin       normal vascular       normal lymphatic    Posture: Flexed forward posture    Palpation:  Tender over the right lumbar paraspinal muscles    ROM: Flexion, extension, rotation, and lateral flexion are decreased.  Extension and right lateral flexion are painful.      Strength:  5/5 hip flexion/abduction/adduction, knee flexion/extension, ankle dorsiflexion/plantarflexion    Sensation:    grossly intact throughout lower extremities    Radiology:  X-rays ordered and independent visualization of images performed and reviewed with Mya.    Recent Results (from the past 744 hour(s))   XR Lumbar Spine 2/3 Views    Narrative    LUMBAR SPINE TWO-THREE  VIEWS  12/6/2018 10:14 AM     HISTORY:  Chronic right-sided low back pain with right-sided sciatica.    COMPARISON: June 25, 2015, October 17, 2013    FINDINGS: Marked lumbar scoliosis with convexity towards the right.  Moderate-to-severe multilevel degenerative change.   Lordosis is  unremarkable. There is no acute fracture or dislocation.  There are no  worrisome bony lesions.      Impression    IMPRESSION:  Progressive scoliosis and degenerative change since 2013.    JORGE GARCIA MD          Assessment:  1. Chronic right-sided low back pain with right-sided sciatica    2. Lumbar degenerative disc disease        Plan:  Discussed the assessment with the patient and developed a plan together:  -Rehab: Physical Therapy: [facility] - [phone]. Please do 5-6 days of exercises per week between formal sessions and the home exercises they provide.  -MRI imaging of the lumbar spine - [facility]: [facility] - [phone]       -We also discussed other future treatment options:  Epidural steroid  injection    Follow Up: Following completion of MRI in clinic. Please schedule at 1-2 days after MRI is completed to ensure we have the results of the MRI    Princess Cohen MD, [facility]  [facility]      Again, thank you for allowing me to participate in the care of your patient.        Sincerely,        Nereyda Cohen MD

## 2018-12-06 NOTE — PATIENT INSTRUCTIONS
Today's Plan of Care:  -Rehab: Physical Therapy: [facility] - [phone]. Please do 5-6 days of exercises per week between formal sessions and the home exercises they provide.  -MRI imaging of the lumbar spine - [facility]: [facility] - [phone]       -We also discussed other future treatment options:  Epidural steroid injection    Follow Up: Following completion of MRI in clinic. Please schedule at 1-2 days after MRI is completed to ensure we have the results of the MRI

## 2018-12-06 NOTE — MR AVS SNAPSHOT
After Visit Summary   12/6/2018    Mya Hui    MRN: [id number]           Patient Information     Date Of Birth          1940        Visit Information        Provider Department      12/6/2018 10:00 AM Nereyda Cohen MD [facility]        Today's Diagnoses     Chronic right-sided low back pain with right-sided sciatica    -  1      Care Instructions    Today's Plan of Care:  -Rehab: Physical Therapy: [facility] - [phone]. Please do 5-6 days of exercises per week between formal sessions and the home exercises they provide.  -MRI imaging of the lumbar spine - [facility]: [place] - [phone]       -We also discussed other future treatment options:  Epidural steroid injection    Follow Up: Following completion of MRI in clinic. Please schedule at 1-2 days after MRI is completed to ensure we have the results of the MRI              Follow-ups after your visit        Additional Services     [facility], AND CH[facility]RACT[facility] REFERRAL       Physical Therapy, Hand Therapy and Chiropractic Care are available through:  *[facility]  *Hand Therapy (Occupational Therapy or Physical Therapy)  *[facility]    Call one number to schedule at any of the above locations: [phone].    Physical therapy, Hand therapy and/or Chiropractic care has been recommended by your physician as an excellent treatment option to reduce pain and help people return to normal activities, including sports.  Therapy and/or chiropractic care services are a great complement or alternative to expensive and invasive surgery, injections, or long-term use of prescription medications. The primary goal is to identify the underlying problem and provide you the tools to manage your condition on your own.     Please be aware that coverage of these services is subject to the terms and limitations of your health insurance plan.  Call member  "services at your health plan with any benefit or coverage questions.      Please bring the following to your appointment:  *Your personal calendar for scheduling future appointments  *Comfortable clothing                  Your next 10 appointments already scheduled     Feb 05, 2019 10:00 AM CST   Return Visit with Efren [facility]io MD   [facility]    [address] [place] [address] [phone]              Future tests that were ordered for you today     Open Future Orders        Priority Expected Expires Ordered    [facility], [facility] CH[facility]RACT[facility] REFERRAL Routine  12/6/2019 12/6/2018            Who to contact     If you have questions or need follow up information about today's clinic visit or your schedule please contact [facility] directly at [phone].  Normal or non-critical lab and imaging results will be communicated to you by MyChart, letter or phone within 4 business days after the clinic has received the results. If you do not hear from us within 7 days, please contact the clinic through MyChart or phone. If you have a critical or abnormal lab result, we will notify you by phone as soon as possible.  Submit refill requests through [employer] or call your pharmacy and they will forward the refill request to us. Please allow 3 business days for your refill to be completed.          Additional Information About Your Visit        Care EveryWhere ID     This is your Care EveryWhere ID. This could be used by other organizations to access your [place] medical records  [id number]        Your Vitals Were     Height BMI (Body Mass Index)                5' 5\" (1.651 m) 32.08 kg/m2           Blood Pressure from Last 3 Encounters:   12/06/18 132/86   11/27/18 (!) 183/94   10/19/18 166/87    Weight from Last 3 Encounters:   12/06/18 192 lb 12.8 oz (87.5 kg)   11/27/18 192 lb 12.8 oz (87.5 kg)   10/19/18 198 lb 6.4 oz (90 kg)    "            Primary Care Provider Office Phone # Fax #    Chanell Nelson, MAYI [facility] [phone] [address] [place] [address]        Equal Access to Services     JANETTE RIBEIRO : Hadii aad ku hadbradyo Sorenaali, waaxda luqadaha, qaybta kaalmada adericharyada, ct muñoz elizabeth cuellar. So [facility] [phone].    ATENCIÓN: Si habla español, tiene a pedro disposición servicios gratuitos de asistencia lingüística. Llame al [phone].    We comply with applicable federal civil rights laws and [place] laws. We do not discriminate on the basis of race, color, national origin, age, disability, sex, sexual orientation, or gender identity.            Thank you!     Thank you for choosing [facility]  for your care. Our goal is always to provide you with excellent care. Hearing back from our patients is one way we can continue to improve our services. Please take a few minutes to complete the written survey that you may receive in the mail after your visit with us. Thank you!             Your Updated Medication List - Protect others around you: Learn how to safely use, store and throw away your medicines at www.disposemymeds.org.          This list is accurate as of 12/6/18 10:38 AM.  Always use your most recent med list.                   Brand Name Dispense Instructions for use Diagnosis    ACCU-CHEK COMPACT PLUS test strip   Generic drug:  blood glucose monitoring     102 each    USE TO TEST BLOOD BLOOD SUGARS ONCE DAILY OR AS DIRECTED    Type 2 diabetes mellitus without complication, without long-term current use of insulin (H)       acetaminophen 325 MG tablet    TYLENOL    100 tablet    Take 2 tablets (650 mg) by mouth every 4 hours as needed for mild pain        amitriptyline 25 MG tablet    ELAVIL    90 tablet    TAKE ONE TABLET BY MOUTH AT BEDTIME    Insomnia, unspecified type       blood glucose lancets standard    NO BRAND SPECIFIED    1 Box    Glucose test strips  Use to test blood sugar 1 times daily or as directed.    Type 2 diabetes mellitus without complication, without long-term current use of insulin (H)       blood glucose monitoring lancets     100 each    USE TO TEST BLOOD SUGARS ONCE DAILY OR AS DIRECTED    Type 2 diabetes mellitus without complication, without long-term current use of insulin (H)       levothyroxine 75 MCG tablet    SYNTHROID/LEVOTHROID    60 tablet    TAKE ONE TABLET BY MOUTH ONCE DAILY    Hypothyroidism, unspecified type       lisinopril 20 MG tablet    PRINIVIL/ZESTRIL    60 tablet    TAKE ONE TABLET BY MOUTH ONCE DAILY    Essential hypertension with goal blood pressure less than 140/90       metFORMIN 500 MG 24 hr tablet    GLUCOPHAGE-XR    270 tablet    ONE TABLET BY MOUTH EVERY MORNING AND TAKE TWO TABLETS BY MOUTH EVERY EVENING WITH MEALS    Type 2 diabetes mellitus without complication, without long-term current use of insulin (H)       MULTIVITAL PO      Take  by mouth. daily        oxybutynin 5 MG tablet    DITROPAN    240 tablet    TAKE TWO TABLETS BY MOUTH TWICE A DAY AS NEEDED BLADDER SPASMS    OAB (overactive bladder)       pantoprazole 40 MG EC tablet    PROTONIX    180 tablet    Take 1 tablet (40 mg) by mouth 2 times daily Take 30-60 minutes before a meal.    Gastroesophageal reflux disease, esophagitis presence not specified       potassium chloride ER 10 MEQ CR tablet    K-TAB/KLOR-CON    90 tablet    TAKE ONE TABLET BY MOUTH THREE TIMES A DAY    Hypokalemia       pravastatin 40 MG tablet    PRAVACHOL    30 tablet    TAKE ONE TABLET BY MOUTH ONCE DAILY    Hyperlipidemia with target LDL less than 100

## 2018-12-12 ENCOUNTER — TRANSFERRED RECORDS (OUTPATIENT)
Dept: HEALTH INFORMATION MANAGEMENT | Facility: CLINIC | Age: 78
End: 2018-12-12

## 2018-12-13 DIAGNOSIS — E11.9 TYPE 2 DIABETES MELLITUS WITHOUT COMPLICATION, WITHOUT LONG-TERM CURRENT USE OF INSULIN (H): ICD-10-CM

## 2018-12-14 ENCOUNTER — OFFICE VISIT (OUTPATIENT)
Dept: ORTHOPEDICS | Facility: OTHER | Age: 78
End: 2018-12-14
Payer: COMMERCIAL

## 2018-12-14 VITALS
BODY MASS INDEX: 32.12 KG/M2 | SYSTOLIC BLOOD PRESSURE: 142 MMHG | DIASTOLIC BLOOD PRESSURE: 92 MMHG | HEIGHT: 65 IN | WEIGHT: 192.8 LBS

## 2018-12-14 DIAGNOSIS — M51.369 LUMBAR DEGENERATIVE DISC DISEASE: ICD-10-CM

## 2018-12-14 DIAGNOSIS — M54.41 CHRONIC RIGHT-SIDED LOW BACK PAIN WITH RIGHT-SIDED SCIATICA: Primary | ICD-10-CM

## 2018-12-14 DIAGNOSIS — G89.29 CHRONIC RIGHT-SIDED LOW BACK PAIN WITH RIGHT-SIDED SCIATICA: Primary | ICD-10-CM

## 2018-12-14 PROCEDURE — 99214 OFFICE O/P EST MOD 30 MIN: CPT | Performed by: PHYSICAL MEDICINE & REHABILITATION

## 2018-12-14 RX ORDER — METFORMIN HCL 500 MG
TABLET, EXTENDED RELEASE 24 HR ORAL
Qty: 270 TABLET | Refills: 1 | Status: SHIPPED | OUTPATIENT
Start: 2018-12-14 | End: 2019-03-08 | Stop reason: DRUGHIGH

## 2018-12-14 ASSESSMENT — MIFFLIN-ST. JEOR: SCORE: 1365.42

## 2018-12-14 NOTE — PATIENT INSTRUCTIONS
Today's Plan of Care:  -Epidural Steroid injection done at [facility]. 12/27/18 with check in at 10:30am, appointment at 11:30am. Please schedule a pre-op with your primary care provider prior to the injection. A nurse will call you a few days prior to the procedure to go over instructions.   -Referral to pain management. Referral printed.  -Continue home exercises    -We also discussed other future treatment options:  Spine surgery referral    Follow Up: As needed if symptoms fail to improve or worsen. Please call with any questions or concerns.       -Patient to follow up with Primary Care provider regarding elevated blood pressure.

## 2018-12-14 NOTE — TELEPHONE ENCOUNTER
Prescription approved per RN refill protocol.  Sarah Portillo RN, BSN      metformin  Last Written Prescription Date:  4/3/2018  Last Fill Quantity: 270,  # refills: 2   Last office visit: 10/10/2018 with prescribing provider:  10/10/2018   Future Office Visit:   Next 5 appointments (look out 90 days)    Dec 14, 2018  3:00 PM CST  [facility] [address] [address] [phone]   Feb 05, 2019 10:00 AM CST  Return Visit with Efren Osorio MD  [facility] [address] [place] [address] [phone]           Requested Prescriptions   Pending Prescriptions Disp Refills     metFORMIN (GLUCOPHAGE-XR) 500 MG 24 hr tablet [Pharmacy Med Name: METFORMIN HCL ER 500MG TB24] 270 tablet 2     Sig: TAKE ONE TABLET BY MOUTH EVERY MORNING AND TAKE TWO TABLETS BY MOUTH IN THE EVENING WITH MEALS    Biguanide Agents Passed - 12/13/2018  1:03 AM       Passed - Blood pressure less than 140/90 in past 6 months    BP Readings from Last 3 Encounters:   12/06/18 132/86   11/27/18 (!) 183/94   10/19/18 166/87                Passed - Patient has documented LDL within the past 12 mos.    Recent Labs   Lab Test 10/10/18  0752   LDL 60            Passed - Patient has had a Microalbumin in the past 15 mos.    Recent Labs   Lab Test 10/10/18  0805   MICROL 31   UMALCR 10.23            Passed - Patient is age 10 or older       Passed - Patient has documented A1c within the specified period of time.    If HgbA1C is 8 or greater, it needs to be on file within the past 3 months.  If less than 8, must be on file within the past 6 months.     Recent Labs   Lab Test 10/10/18  0752   A1C 5.7*            Passed - Patient's CR is NOT>1.4 OR Patient's EGFR is NOT<45 within past 12 mos.    Recent Labs   Lab Test 10/10/18  0752   GFRESTIMATED 48*   GFRESTBLACK 58*  "      Recent Labs   Lab Test 10/10/18  0752   CR 1.11*            Passed - Patient does NOT have a diagnosis of CHF.       Passed - Patient is not pregnant       Passed - Patient has not had a positive pregnancy test within the past 12 mos.        Passed - Recent (6 mo) or future (30 days) visit within the authorizing provider's specialty    Patient had office visit in the last 6 months or has a visit in the next 30 days with authorizing provider or within the authorizing provider's specialty.  See \"Patient Info\" tab in inbasket, or \"Choose Columns\" in Meds & Orders section of the refill encounter.            Sarah Portillo RN on 12/14/2018 at 10:48 AM    "

## 2018-12-14 NOTE — LETTER
12/14/2018         RE: Mya Hui  [address]  [place] [address]        Dear Colleague,    Thank you for referring your patient, Mya Hui, to the [facility]. Please see a copy of my visit note below.    Sports Medicine Clinic Visit - Interim History December 14, 2018    Initial Visit Date 12/6/18    PCP: Chanell Nelson    Mya Hui is a 78 year old female who is seen in follow up.  Since last visit on 12/6/18 patient has continued to have right sided back pain with radiation down the right leg.  She is here today to review her MRI results. She has not scheduled with physical therapy yet. She is doing some home exercises at home. She rates the pain at a  2/10 currently.  Symptoms are relieved with nothing.  Symptoms are worsened by prolonged walking.         Review of Systems  Musculoskeletal: as above  Remainder of review of systems is negative including constitutional, eyes, ENT, CV, pulmonary, GI, , endocrine, skin, hematologic, and neurologic except as noted in HPI or medical history.    History reviewed. No pertinent past surgical/medical/family/social history other than as mentioned in HPI.    Patient Active Problem List   Diagnosis     Advance Care Planning     Arthritis     Nevus     Asymptomatic carotid artery stenosis     Hyperlipidemia with target LDL less than 100     History of renal calculi     Sciatica of right side     Hip pain     Hearing aid worn     Osteoarthritis of hip     DDD (degenerative disc disease), lumbar     OAB (overactive bladder)     Essential hypertension with goal blood pressure less than 140/90     Hypothyroidism, unspecified type     Insomnia, unspecified     Type 2 diabetes mellitus without complication, without long-term current use of insulin (H)     Cataract, bilateral     Primary osteoarthritis involving multiple joints     Chronic right shoulder pain     Right shoulder pain, unspecified chronicity     Dry mouth      Rotator cuff arthropathy, right     Sliding hiatal hernia     Calculus of gallbladder without cholecystitis     Gastroesophageal reflux disease, esophagitis presence not specified     S/P laparoscopic cholecystectomy     Toe anomaly     Cherry angioma     Past Medical History:   Diagnosis Date     Acute cholecystitis 3/23/2018     Acute kidney injury (H) 9/10/2017     Arthritis      Dehydration 9/4/2017     Diabetes type 2, controlled (H)      Elevated lactic acid level 9/10/2017     GERD (gastroesophageal reflux disease)      History of renal calculi      HTN, goal below 140/90      Hyperlipidaemia LDL goal < 100      Hypokalemia 9/3/2017     Hypothyroid      Insomnia      Left carotid stenosis      Migraine      Sciatica of right side 6/28/2013     Past Surgical History:   Procedure Laterality Date     BUNIONECTOMY      Right foot     CATARACT IOL, RT/LT Bilateral 2017     COLONOSCOPY  7/12/2013    Procedure: COLONOSCOPY;  Colonoscopy;  Surgeon: Giovany Espinoza MD;  Location: [facility]     FRACTURE TX, ANKLE RT/LT      Left ankle     HC CYSTOURETHROSCOPY W/ URETEROSCOPY &/OR PYELOSCOPY; W/ LITHOTRIPSY       HC REVISE MEDIAN N/CARPAL TUNNEL SURG      Right wrist     INJECT JOINT SACROILIAC  4/10/2014    Procedure: INJECT JOINT SACROILIAC;  Sacroiliac Joint Injection;  Surgeon: Gilbert Mcclure MD;  Location: [facility]     LAPAROSCOPIC CHOLECYSTECTOMY N/A 3/24/2018    Procedure: LAPAROSCOPIC CHOLECYSTECTOMY;  Laparoscopic Cholecystectomy;  Surgeon: Berry Allen DO;  Location: [facility]     Family History   Problem Relation Age of Onset     Hypertension Brother      Cerebrovascular Disease Brother      Diabetes Father      Cardiovascular Father      Diabetes Brother         Borderline     Breast Cancer Mother      Diabetes Brother      Blood Disease Brother      Cardiovascular Brother         MI     Social History     Socioeconomic History     Marital status:      Spouse name: Not on file     Number of  "children: Not on file     Years of education: Not on file     Highest education level: Not on file   Social Needs     Financial resource strain: Not on file     Food insecurity - worry: Not on file     Food insecurity - inability: Not on file     Transportation needs - medical: Not on file     Transportation needs - non-medical: Not on file   Occupational History     Employer: RETIRED     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 Not Asked   Social History Narrative     Not on file         Current Outpatient Medications   Medication     ACCU-CHEK COMPACT PLUS test strip     acetaminophen (TYLENOL) 325 MG tablet     amitriptyline (ELAVIL) 25 MG tablet     blood glucose (NO BRAND SPECIFIED) lancets standard     blood glucose monitoring (SOFTCLIX) lancets     levothyroxine (SYNTHROID/LEVOTHROID) 75 MCG tablet     lisinopril (PRINIVIL/ZESTRIL) 20 MG tablet     metFORMIN (GLUCOPHAGE-XR) 500 MG 24 hr tablet     Multiple Vitamins-Minerals (MULTIVITAL PO)     oxybutynin (DITROPAN) 5 MG tablet     pantoprazole (PROTONIX) 40 MG EC tablet     potassium chloride (K-TAB,KLOR-CON) 10 MEQ tablet     pravastatin (PRAVACHOL) 40 MG tablet     No current facility-administered medications for this visit.      Allergies   Allergen Reactions     Codeine Nausea     Fentanyl Nausea and Vomiting     VERY sensitive to most narcotics if not all.         Objective:  BP (!) 142/92   Ht 1.651 m (5' 5\")   Wt 87.5 kg (192 lb 12.8 oz)   BMI 32.08 kg/m       General: Alert and in no distress    Head: Normocephalic, atraumatic  Eyes: no scleral icterus or conjunctival erythema   Oropharynx:  Mucous membranes moist  Skin: no erythema, petechiae, or jaundice  Resp: normal respiratory " effort without conversational dyspnea   Psych: normal mood and affect    Gait:  Slow, flexed forward at the waist, uses a walker for ambulation  Neuro: Motor strength and sensation as noted below      Radiology:  Independent visualization of images performed and reviewed with Mya and her daughter, April.    EXAM: LUMBAR SPINE MRI WITHOUT CONTRAST  CLINICAL INFORMATION: 78-year-old female with chronic right-sided low back pain radiating to the right hip and leg.   TECHNICAL INFORMATION: 1.5 Denisha MRI was utilized to obtain sagittal T1 ANGELINA, T2 ANGELINA, STIR; and selected level axial T1 and T2 ANGELINA images of the lumbar spine without intravenous contrast.  SEDATION: None  COMPARISON: No previous images of the lumbar spine are available for comparison.  FINDINGS: For the purposes of this exam, 5 lumbar-type vertebral bodies are described with a transitional lumbosacral segment designated S1. There is a formed S1-2 disc. Left S1 transverse process is fused to the sacrum. Right S1 transverse process touches the sacrum. There is normal lumbar lordosis and moderate, rotatory S-shaped scoliosis apex left at L4-5 and apex right at the thoracolumbar junction. No acute fracture, compression deformity or pars defect. No bone marrow infiltration or destructive bony lesion. The conus is normal in size, shape and signal characteristics, terminating at L2. Nerve roots of the cauda equina are unremarkable.   L5-S1: 5 mm anterolisthesis. Mild disc height loss with disc desiccation and uncovered, mildly bulging disc flattening the ventral thecal sac. No spinal canal stenosis. Mild right subarticular recess narrowing. Disc and osteophytes cause moderate right/mild left foraminal narrowing with mild impingement of the right L5 ganglion (series 3 images 7 and 8). Advanced hypertrophic facet arthropathy bilaterally with mild bony/perifacet edema.  L4-5: Moderate disc degeneration with asymmetric loss of disc height to the right and prominent  right lateral osteophytes. Mild broad-based disc bulge. No spinal canal stenosis. Disc and osteophytes severely narrow the right neural foramen with impingement of the right L4 ganglion (series 3 images 8 and 9). Advanced right/mild left facet arthropathy.  L3-4: 10 mm right lateral listhesis of L3 on L4. Advanced disc degeneration with near complete loss of disc height, mild type II reactive endplate changes/irregularity, marginal osteophytes and mild concentric disc bulge. Superimposed, chronic-appearing 2 mm AP central disc extrusion dissecting 6 mm cranially. There is moderately severe spinal canal stenosis with crowding of the roots of the cauda equina and left greater than right subarticular recess narrowing with impingement of the transiting left L4 root (series 6 image 23). Disc and osteophytes cause severe left/moderate right foraminal narrowing with L3 ganglionic impingement left (series 3 images 12 and 13) and abutment right. Advanced bilateral facet arthropathy.  L2-3, L1-2: Preserved disc height with disc desiccation and concentric disc bulge at both levels. No spinal canal or foraminal stenosis. Mild facet arthropathy at L2-3.  T12-L1: Mild disc height loss with disc desiccation, marginal osteophytes and concentric disc bulge. Superimposed 3 mm AP central disc extrusion dissecting 4 mm both cranially and caudally. No spinal canal stenosis. Moderate left/mild right facet arthropathy, causing mild left foraminal narrowing.  Moderate degenerative changes at the sacroiliac joints. Images through the sacrum and iliac bones are otherwise unremarkable. Moderate to advanced posterior paraspinal muscle atrophy. Visualized abdominal aorta, IVC and kidneys are unremarkable. Incidentally noted 1 cm left ovarian cyst.   CONCLUSION: Extensive disc and facet degeneration in the lumbar spine with moderate S-shaped scoliosis and with specific findings as follows:  1. Right foraminal narrowing severe at L4-5 and  moderate at L5-S1 with impingement of the right L4 right L5 ganglia.  2. At L3-4, advanced disc degeneration and 10 mm right lateral listhesis of L3 on L4. Disc bulge/extrusion and degenerative facets cause moderately severe spinal canal stenosis and subarticular recess narrowing with impingement of the transiting left L4 root. Severe left/moderate right foraminal narrowing with impingement of the left L3 ganglion.  3. Shallow disc extrusion at T12-L1 and disc bulging at L1-2 and L2-3, without exiting nerve root impingement or central canal stenosis.  4. Degenerative facet arthropathy advanced from L3-4 through L5-S1 and mild to moderate at other levels, with associated inflammatory changes at the L5-S1 facet joints.  5. There is no evidence of fracture, infection, tumor or arachnoiditis.  DTW       Electronically signed on 12/13/2018 6:35:00 AM by Ibrahima Joiner M.D.       Assessment:  1. Chronic right-sided low back pain with right-sided sciatica    2. Lumbar degenerative disc disease        Plan:  Discussed the assessment with the patient and developed a plan together:  -Epidural Steroid injection done at [facility] 12/27/18 with check in at 10:30am, appointment at 11:30am. Advised that she schedule a pre-op with her primary care provider prior to the injection.  A nurse will call her a few days prior to the procedure to go over instructions.   -External referral to pain management. Referral printed.  -Continue home exercises    -We also discussed other future treatment options:  Spine surgery referral    -Patient to follow up with Primary Care provider regarding elevated blood pressure.    Follow up as needed if symptoms fail to improve or worsen. Please call with any questions or concerns.     Time spent in one-on-one evaluation and discussion with patient regarding nature of problem, course, prior treatments, and therapeutic options, at least 50% of which was spent in counseling and  coordination of care:  25 minutes.      Princess Cohen MD, [facility] Sports Medicine  [facility]        Again, thank you for allowing me to participate in the care of your patient.        Sincerely,        Nereyda Cohen MD

## 2018-12-14 NOTE — PROGRESS NOTES
Sports Medicine Clinic Visit - Interim History December 14, 2018    Initial Visit Date 12/6/18    PCP: Chanell Nelson    Mya Hui is a 78 year old female who is seen in follow up.  Since last visit on 12/6/18 patient has continued to have right sided back pain with radiation down the right leg.  She is here today to review her MRI results. She has not scheduled with physical therapy yet. She is doing some home exercises at home. She rates the pain at a  2/10 currently.  Symptoms are relieved with nothing.  Symptoms are worsened by prolonged walking.         Review of Systems  Musculoskeletal: as above  Remainder of review of systems is negative including constitutional, eyes, ENT, CV, pulmonary, GI, , endocrine, skin, hematologic, and neurologic except as noted in HPI or medical history.    History reviewed. No pertinent past surgical/medical/family/social history other than as mentioned in HPI.    Patient Active Problem List   Diagnosis     Advance Care Planning     Arthritis     Nevus     Asymptomatic carotid artery stenosis     Hyperlipidemia with target LDL less than 100     History of renal calculi     Sciatica of right side     Hip pain     Hearing aid worn     Osteoarthritis of hip     DDD (degenerative disc disease), lumbar     OAB (overactive bladder)     Essential hypertension with goal blood pressure less than 140/90     Hypothyroidism, unspecified type     Insomnia, unspecified     Type 2 diabetes mellitus without complication, without long-term current use of insulin (H)     Cataract, bilateral     Primary osteoarthritis involving multiple joints     Chronic right shoulder pain     Right shoulder pain, unspecified chronicity     Dry mouth     Rotator cuff arthropathy, right     Sliding hiatal hernia     Calculus of gallbladder without cholecystitis     Gastroesophageal reflux disease, esophagitis presence not specified     S/P laparoscopic cholecystectomy     Toe anomaly     Cherry angioma      Past Medical History:   Diagnosis Date     Acute cholecystitis 3/23/2018     Acute kidney injury (H) 9/10/2017     Arthritis      Dehydration 9/4/2017     Diabetes type 2, controlled (H)      Elevated lactic acid level 9/10/2017     GERD (gastroesophageal reflux disease)      History of renal calculi      HTN, goal below 140/90      Hyperlipidaemia LDL goal < 100      Hypokalemia 9/3/2017     Hypothyroid      Insomnia      Left carotid stenosis      Migraine      Sciatica of right side 6/28/2013     Past Surgical History:   Procedure Laterality Date     BUNIONECTOMY      Right foot     CATARACT IOL, RT/LT Bilateral 2017     COLONOSCOPY  7/12/2013    Procedure: COLONOSCOPY;  Colonoscopy;  Surgeon: Giovany Espinoza MD;  [facility]: [facility]     FRACTURE TX, ANKLE RT/LT      Left ankle     HC CYSTOURETHROSCOPY W/ URETEROSCOPY &/OR PYELOSCOPY; W/ LITHOTRIPSY       HC REVISE MEDIAN N/CARPAL TUNNEL SURG      Right wrist     INJECT JOINT SACROILIAC  4/10/2014    Procedure: INJECT JOINT SACROILIAC;  Sacroiliac Joint Injection;  Surgeon: Gilbert Mcclure MD;  [facility]     LAPAROSCOPIC CHOLECYSTECTOMY N/A 3/24/2018    Procedure: LAPAROSCOPIC CHOLECYSTECTOMY;  Laparoscopic Cholecystectomy;  Surgeon: Berry Allen DO;  Location: [facility]     Family History   Problem Relation Age of Onset     Hypertension Brother      Cerebrovascular Disease Brother      Diabetes Father      Cardiovascular Father      Diabetes Brother         Borderline     Breast Cancer Mother      Diabetes Brother      Blood Disease Brother      Cardiovascular Brother         MI     Social History     Socioeconomic History     Marital status:      Spouse name: Not on file     Number of children: Not on file     Years of education: Not on file     Highest education level: Not on file   Social Needs     Financial resource strain: Not on file     Food insecurity - worry: Not on file     Food insecurity - inability: Not on file      "Transportation needs - medical: Not on file     Transportation needs - non-medical: Not on file   Occupational History     Employer: RETIRED     Comment: 's office part time   Tobacco Use     Smoking status: Never Smoker     Smokeless tobacco: Never Used   Substance and Sexual Activity     Alcohol use: Yes     Drug use: No     Sexual activity: Not Currently   Other Topics Concern     Parent/sibling w/ CABG, MI or angioplasty before 65F 55M? Not Asked   Social History Narrative     Not on file         Current Outpatient Medications   Medication     ACCU-CHEK COMPACT PLUS test strip     acetaminophen (TYLENOL) 325 MG tablet     amitriptyline (ELAVIL) 25 MG tablet     blood glucose (NO BRAND SPECIFIED) lancets standard     blood glucose monitoring (SOFTCLIX) lancets     levothyroxine (SYNTHROID/LEVOTHROID) 75 MCG tablet     lisinopril (PRINIVIL/ZESTRIL) 20 MG tablet     metFORMIN (GLUCOPHAGE-XR) 500 MG 24 hr tablet     Multiple Vitamins-Minerals (MULTIVITAL PO)     oxybutynin (DITROPAN) 5 MG tablet     pantoprazole (PROTONIX) 40 MG EC tablet     potassium chloride (K-TAB,KLOR-CON) 10 MEQ tablet     pravastatin (PRAVACHOL) 40 MG tablet     No current facility-administered medications for this visit.      Allergies   Allergen Reactions     Codeine Nausea     Fentanyl Nausea and Vomiting     VERY sensitive to most narcotics if not all.         Objective:  BP (!) 142/92   Ht 1.651 m (5' 5\")   Wt 87.5 kg (192 lb 12.8 oz)   BMI 32.08 kg/m      General: Alert and in no distress    Head: Normocephalic, atraumatic  Eyes: no scleral icterus or conjunctival erythema   Oropharynx:  Mucous membranes moist  Skin: no erythema, petechiae, or jaundice  Resp: normal respiratory effort without conversational dyspnea   Psych: normal mood and affect    Gait:  Slow, flexed forward at the waist, uses a walker for ambulation  Neuro: Motor strength and sensation as noted below      Radiology:  Independent visualization of images " performed and reviewed with Mya and her daughter, April.    EXAM: LUMBAR SPINE MRI WITHOUT CONTRAST  CLINICAL INFORMATION: 78-year-old female with chronic right-sided low back pain radiating to the right hip and leg.   TECHNICAL INFORMATION: 1.5 Denisha MRI was utilized to obtain sagittal T1 ANGELINA, T2 ANGELINA, STIR; and selected level axial T1 and T2 ANGELINA images of the lumbar spine without intravenous contrast.  SEDATION: None  COMPARISON: No previous images of the lumbar spine are available for comparison.  FINDINGS: For the purposes of this exam, 5 lumbar-type vertebral bodies are described with a transitional lumbosacral segment designated S1. There is a formed S1-2 disc. Left S1 transverse process is fused to the sacrum. Right S1 transverse process touches the sacrum. There is normal lumbar lordosis and moderate, rotatory S-shaped scoliosis apex left at L4-5 and apex right at the thoracolumbar junction. No acute fracture, compression deformity or pars defect. No bone marrow infiltration or destructive bony lesion. The conus is normal in size, shape and signal characteristics, terminating at L2. Nerve roots of the cauda equina are unremarkable.   L5-S1: 5 mm anterolisthesis. Mild disc height loss with disc desiccation and uncovered, mildly bulging disc flattening the ventral thecal sac. No spinal canal stenosis. Mild right subarticular recess narrowing. Disc and osteophytes cause moderate right/mild left foraminal narrowing with mild impingement of the right L5 ganglion (series 3 images 7 and 8). Advanced hypertrophic facet arthropathy bilaterally with mild bony/perifacet edema.  L4-5: Moderate disc degeneration with asymmetric loss of disc height to the right and prominent right lateral osteophytes. Mild broad-based disc bulge. No spinal canal stenosis. Disc and osteophytes severely narrow the right neural foramen with impingement of the right L4 ganglion (series 3 images 8 and 9). Advanced right/mild left facet  arthropathy.  L3-4: 10 mm right lateral listhesis of L3 on L4. Advanced disc degeneration with near complete loss of disc height, mild type II reactive endplate changes/irregularity, marginal osteophytes and mild concentric disc bulge. Superimposed, chronic-appearing 2 mm AP central disc extrusion dissecting 6 mm cranially. There is moderately severe spinal canal stenosis with crowding of the roots of the cauda equina and left greater than right subarticular recess narrowing with impingement of the transiting left L4 root (series 6 image 23). Disc and osteophytes cause severe left/moderate right foraminal narrowing with L3 ganglionic impingement left (series 3 images 12 and 13) and abutment right. Advanced bilateral facet arthropathy.  L2-3, L1-2: Preserved disc height with disc desiccation and concentric disc bulge at both levels. No spinal canal or foraminal stenosis. Mild facet arthropathy at L2-3.  T12-L1: Mild disc height loss with disc desiccation, marginal osteophytes and concentric disc bulge. Superimposed 3 mm AP central disc extrusion dissecting 4 mm both cranially and caudally. No spinal canal stenosis. Moderate left/mild right facet arthropathy, causing mild left foraminal narrowing.  Moderate degenerative changes at the sacroiliac joints. Images through the sacrum and iliac bones are otherwise unremarkable. Moderate to advanced posterior paraspinal muscle atrophy. Visualized abdominal aorta, IVC and kidneys are unremarkable. Incidentally noted 1 cm left ovarian cyst.   CONCLUSION: Extensive disc and facet degeneration in the lumbar spine with moderate S-shaped scoliosis and with specific findings as follows:  1. Right foraminal narrowing severe at L4-5 and moderate at L5-S1 with impingement of the right L4 right L5 ganglia.  2. At L3-4, advanced disc degeneration and 10 mm right lateral listhesis of L3 on L4. Disc bulge/extrusion and degenerative facets cause moderately severe spinal canal stenosis and  subarticular recess narrowing with impingement of the transiting left L4 root. Severe left/moderate right foraminal narrowing with impingement of the left L3 ganglion.  3. Shallow disc extrusion at T12-L1 and disc bulging at L1-2 and L2-3, without exiting nerve root impingement or central canal stenosis.  4. Degenerative facet arthropathy advanced from L3-4 through L5-S1 and mild to moderate at other levels, with associated inflammatory changes at the L5-S1 facet joints.  5. There is no evidence of fracture, infection, tumor or arachnoiditis.  DTW       Electronically signed on 12/13/2018 6:35:00 AM by Ibrahima Joiner M.D.       Assessment:  1. Chronic right-sided low back pain with right-sided sciatica    2. Lumbar degenerative disc disease        Plan:  Discussed the assessment with the patient and developed a plan together:  -Epidural Steroid injection done at [facility] 12/27/18 with check in at 10:30am, appointment at 11:30am. Advised that she schedule a pre-op with her primary care provider prior to the injection.  A nurse will call her a few days prior to the procedure to go over instructions.   -External referral to pain management. Referral printed.  -Continue home exercises    -We also discussed other future treatment options:  Spine surgery referral    -Patient to follow up with Primary Care provider regarding elevated blood pressure.    Follow up as needed if symptoms fail to improve or worsen. Please call with any questions or concerns.     Time spent in one-on-one evaluation and discussion with patient regarding nature of problem, course, prior treatments, and therapeutic options, at least 50% of which was spent in counseling and coordination of care:  25 minutes.      Princess Cohen MD, [facility] Sports Medicine  [facility]

## 2018-12-17 ENCOUNTER — TELEPHONE (OUTPATIENT)
Dept: FAMILY MEDICINE | Facility: CLINIC | Age: 78
End: 2018-12-17

## 2018-12-17 NOTE — TELEPHONE ENCOUNTER
Reason for Call:  Same Day Appointment, Requested Provider:  Chanell Nelson NP    PCP: Chanell Nelson    Reason for visit: pre op    Duration of symptoms:     Have you been treated for this in the past? Yes    Additional comments: Mya has an injection scheduled for 12-27-18, she needs a pre op and is asking to be worked into your schedule on 12-21-18    Can we leave a detailed message on this number? YES    Phone number patient can be reached at: Home number on file [phone] (home)    Best Time: anytime - needs a 45 minute notice    Call taken on 12/17/2018 at 12:32 PM by Chanda Dyer

## 2018-12-18 NOTE — TELEPHONE ENCOUNTER
Called patient, appointment made for 12/21/18 1130am. Patient aware, states understanding. ACase/MA

## 2018-12-20 ENCOUNTER — TELEPHONE (OUTPATIENT)
Dept: GASTROENTEROLOGY | Facility: CLINIC | Age: 78
End: 2018-12-20

## 2018-12-20 NOTE — TELEPHONE ENCOUNTER
[facility] Call Center    Phone Message    May a detailed message be left on voicemail: no    Reason for Call: Other: Pt requesting a call back.  She is not doing well.      Action Taken: Message routed to:  Adult Clinics: Gastroenterology (GI) p [phone]

## 2018-12-20 NOTE — TELEPHONE ENCOUNTER
Triage Nurse Call    Symptoms- Pt having diarrhea the past 2 days. She was doing well after the last OV with Dr. Osorio with his recommendations. She said that she is having urgency and had some stool incontinence.    MD- Dr. Osorio    Background- Chronic diarrhea, going 2-3 times per day    Pain- 0    N/V or fever- No    Medications- She is taking 1 Imodium and 2 Metamucil wafers in the afternoon.    Nurse Action- Nurse said that will update Dr. Osorio to see if he has any recommendations, then call patient back.    Nida Edwards RN, BSN, PHN  [facility]  GI/Gen Surg/Hepatology Care Coordinator

## 2018-12-21 ENCOUNTER — OFFICE VISIT (OUTPATIENT)
Dept: FAMILY MEDICINE | Facility: CLINIC | Age: 78
End: 2018-12-21
Payer: COMMERCIAL

## 2018-12-21 DIAGNOSIS — M54.31 SCIATICA OF RIGHT SIDE: ICD-10-CM

## 2018-12-21 DIAGNOSIS — M51.369 DDD (DEGENERATIVE DISC DISEASE), LUMBAR: ICD-10-CM

## 2018-12-21 DIAGNOSIS — Z01.818 PREOP GENERAL PHYSICAL EXAM: Primary | ICD-10-CM

## 2018-12-21 PROCEDURE — 99214 OFFICE O/P EST MOD 30 MIN: CPT | Performed by: NURSE PRACTITIONER

## 2018-12-21 ASSESSMENT — PAIN SCALES - GENERAL: PAINLEVEL: MODERATE PAIN (5)

## 2018-12-21 NOTE — TELEPHONE ENCOUNTER
Efren Osorio MD Berkenes, Melissa, RN   Caller: Unspecified (Yesterday,  1:44 PM)             Suki,     Can you contact this patient?  I reviewed some of her x-rays from October.  There is stool in the right colon suggesting she may have overflow incontinence due to chronic constipation.  Suggest she stop the immodium.  Continue Citrucel.  If symptoms continue, then may need bowel cleanout.  Follow up in clinic as scheduled to discuss further testing, possible colonoscopy.  I'm available on the procedure unit tomorrow if questions.     Thanks!     -Nader      Received the above message from Dr. Osorio. Called and spoke to patient who is aware of the above information. Patient reports that she will stop the immodium and continue the citrucel. Informed patient to call the clinic if symptoms persist. Patient verbalized understanding and was comfortable with plan. Patient plans to follow up with Dr. Castillo's as scheduled in February.     Suki Nunes RN, BSN

## 2018-12-21 NOTE — PROGRESS NOTES
[address] [address]  [phone]  Dept: [phone]    PRE-OP EVALUATION:  Today's date: 2018    Mya Hui (: 1940) presents for pre-operative evaluation assessment as requested by Dr. Mcclure.  She requires evaluation and anesthesia risk assessment prior to undergoing surgery/procedure for treatment of right hip pain        Proposed Surgery/ Procedure: .INJECT EPIDURAL LUMBAR right foraminal narrowing severe at L4-L5 and moderate at L5-S1  Date of Surgery/ Procedure: 18  Time of Surgery/ Procedure:   Hospital/Surgical Facility: [facility]  Fax number for surgical facility:   Primary Physician: Chanell Nelson  Type of Anesthesia Anticipated: MAC    Patient has a Health Care Directive or Living Will:  YES    1. NO - Do you have a history of heart attack, stroke, stent, bypass or surgery on an artery in the head, neck, heart or legs?  2. NO - Do you ever have any pain or discomfort in your chest?  3. NO - Do you have a history of  Heart Failure?  4. NO - Are you troubled by shortness of breath when: walking on the level, up a slight hill or at night?  5. NO - Do you currently have a cold, bronchitis or other respiratory infection?  6. NO - Do you have a cough, shortness of breath or wheezing?  7. NO - Do you sometimes get pains in the calves of your legs when you walk?  8. yes - Do you or anyone in your family have previous history of blood clots? daughter  9. yes - Do you or does anyone in your family have a serious bleeding problem such as prolonged bleeding following surgeries or cuts? daughter  10. yes - Have you ever had problems with anemia or been told to take iron pills?  11. NO - Have you had any abnormal blood loss such as black, tarry or bloody stools, or abnormal vaginal bleeding?  12. NO - Have you ever had a blood transfusion?  13. NO - Have you or any of your relatives ever had problems with anesthesia?  14. NO - Do you  have sleep apnea, excessive snoring or daytime drowsiness?  15. NO - Do you have any prosthetic heart valves?  16. NO - Do you have prosthetic joints?  17. NO - Is there any chance that you may be pregnant?      HPI:     HPI related to upcoming procedure: Mya has a long-standing history of pain in the right hip and right sciatic pain.  She has a history of osteoarthritis in multiple joints, also degenerative disc disease.  She is being scheduled for the epidural injections for management of her right sciatica.      DIABETES - Patient has a longstanding history of DiabetesType Type II . Patient is being treated with oral agents and denies significant side effects. Control has been good. Complicating factors include but are not limited to: hypertension.                                                                                                                            .  HYPOTHYROIDISM - Patient has a longstanding history of chronic Hypothyroidism. Patient has been doing well, noting no tremor, insomnia, hair loss or changes in skin texture. Continues to take medications as directed, without adverse reactions or side effects. Last TSH   Lab Results   Component Value Date    TSH 3.84 10/10/2018   .                                                                                                                                                                                                                        .    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     Priority: Medium     Cherry angioma 04/03/2018     Priority: Medium     Gastroesophageal reflux disease, esophagitis presence not specified 09/14/2017     Priority: Medium     Sliding hiatal hernia 09/11/2017     Priority: Medium     Calculus of gallbladder without cholecystitis 09/11/2017     Priority: Medium     Rotator cuff  arthropathy, right 08/28/2017     Priority: Medium     Chronic right shoulder pain 08/10/2017     Priority: Medium     Right shoulder pain, unspecified chronicity 08/10/2017     Priority: Medium     Dry mouth 08/10/2017     Priority: Medium     Primary osteoarthritis involving multiple joints 06/16/2017     Priority: Medium     Cataract, bilateral 03/30/2017     Priority: Medium     Type 2 diabetes mellitus without complication, without long-term current use of insulin (H) 02/16/2017     Priority: Medium     Hypothyroidism, unspecified type 02/14/2017     Priority: Medium     Insomnia, unspecified 02/14/2017     Priority: Medium     OAB (overactive bladder) 09/07/2016     Priority: Medium     Essential hypertension with goal blood pressure less than 140/90 09/07/2016     Priority: Medium     Osteoarthritis of hip 08/21/2014     Priority: Medium     Do you wish to do the replacement in the background? yes         DDD (degenerative disc disease), lumbar 08/21/2014     Priority: Medium     Hearing aid worn 05/30/2014     Priority: Medium     Right ear       Hip pain 09/27/2013     Priority: Medium     Sciatica of right side 06/28/2013     Priority: Medium     Asymptomatic carotid artery stenosis 05/02/2013     Priority: Medium     Hyperlipidemia with target LDL less than 100      Priority: Medium     Diagnosis updated by automated process. Provider to review and confirm.       History of renal calculi      Priority: Medium     Nevus 04/10/2013     Priority: Medium     Do you wish to do the replacement in the background? yes         Advance Care Planning 04/04/2013     Priority: Medium     Advance Care Planning:   Receipt of ACP document:  Received: Health Care Directive which was witnessed or notarized on 3/18/10.  Document not previously scanned.  Validation form completed and sent with document to be scanned.  Code Status reflects choices in most recent ACP document.  Confirmed/documented designated decision  maker(s). See permanent comments section of demographics in clinical tab. View document(s) and details by clicking on code status.   Added by Jolie Wagner on 4/23/2014.                   Arthritis      Priority: Medium      Past Medical History:   Diagnosis Date     Acute cholecystitis 3/23/2018     Acute kidney injury (H) 9/10/2017     Arthritis      Dehydration 9/4/2017     Diabetes type 2, controlled (H)      Elevated lactic acid level 9/10/2017     GERD (gastroesophageal reflux disease)      History of renal calculi      HTN, goal below 140/90      Hyperlipidaemia LDL goal < 100      Hypokalemia 9/3/2017     Hypothyroid      Insomnia      Left carotid stenosis      Migraine      Sciatica of right side 6/28/2013     Past Surgical History:   Procedure Laterality Date     BUNIONECTOMY      Right foot     CATARACT IOL, RT/LT Bilateral 2017     COLONOSCOPY  7/12/2013    Procedure: COLONOSCOPY;  Colonoscopy;  Surgeon: Giovany Espinoza MD;  Location: [facility]     FRACTURE TX, ANKLE RT/LT      Left ankle     HC CYSTOURETHROSCOPY W/ URETEROSCOPY &/OR PYELOSCOPY; W/ LITHOTRIPSY       HC REVISE MEDIAN N/CARPAL TUNNEL SURG      Right wrist     INJECT JOINT SACROILIAC  4/10/2014    Procedure: INJECT JOINT SACROILIAC;  Sacroiliac Joint Injection;  Surgeon: Gilbert Mcclure MD;  Location: [facility] OR     LAPAROSCOPIC CHOLECYSTECTOMY N/A 3/24/2018    Procedure: LAPAROSCOPIC CHOLECYSTECTOMY;  Laparoscopic Cholecystectomy;  Surgeon: Berry Allen DO;  Location: [facility] OR     Current Outpatient Medications   Medication Sig Dispense Refill     ACCU-CHEK COMPACT PLUS test strip USE TO TEST BLOOD BLOOD SUGARS ONCE DAILY OR AS DIRECTED 102 each 3     acetaminophen (TYLENOL) 325 MG tablet Take 2 tablets (650 mg) by mouth every 4 hours as needed for mild pain 100 tablet      amitriptyline (ELAVIL) 25 MG tablet TAKE ONE TABLET BY MOUTH AT BEDTIME 90 tablet 0     blood glucose (NO BRAND SPECIFIED) lancets standard Glucose test  strips  Use to test blood sugar 1 times daily or as directed. 1 Box 11     blood glucose monitoring (SOFTCLIX) lancets USE TO TEST BLOOD SUGARS ONCE DAILY OR AS DIRECTED 100 each 11     levothyroxine (SYNTHROID/LEVOTHROID) 75 MCG tablet TAKE ONE TABLET BY MOUTH ONCE DAILY 60 tablet 8     lisinopril (PRINIVIL/ZESTRIL) 20 MG tablet TAKE ONE TABLET BY MOUTH ONCE DAILY 60 tablet 0     metFORMIN (GLUCOPHAGE-XR) 500 MG 24 hr tablet TAKE ONE TABLET BY MOUTH EVERY MORNING AND TAKE TWO TABLETS BY MOUTH IN THE EVENING WITH MEALS 270 tablet 1     Multiple Vitamins-Minerals (MULTIVITAL PO) Take  by mouth. daily       oxybutynin (DITROPAN) 5 MG tablet TAKE TWO TABLETS BY MOUTH TWICE A DAY AS NEEDED BLADDER SPASMS 240 tablet 5     pantoprazole (PROTONIX) 40 MG EC tablet Take 1 tablet (40 mg) by mouth 2 times daily Take 30-60 minutes before a meal. 180 tablet 2     potassium chloride (K-TAB,KLOR-CON) 10 MEQ tablet TAKE ONE TABLET BY MOUTH THREE TIMES A DAY 90 tablet 11     pravastatin (PRAVACHOL) 40 MG tablet TAKE ONE TABLET BY MOUTH ONCE DAILY 30 tablet 11     OTC products: None, except as noted above    Allergies   Allergen Reactions     Codeine Nausea     Fentanyl Nausea and Vomiting     VERY sensitive to most narcotics if not all.      Latex Allergy: NO    Social History     Tobacco Use     Smoking status: Never Smoker     Smokeless tobacco: Never Used   Substance Use Topics     Alcohol use: Yes     History   Drug Use No       REVIEW OF SYSTEMS:   CONSTITUTIONAL: NEGATIVE for fever, chills, change in weight  ENT/MOUTH: NEGATIVE for ear, mouth and throat problems  RESP: NEGATIVE for significant cough or SOB  CV: NEGATIVE for chest pain, palpitations or peripheral edema    EXAM:   BP (P) 146/86   Pulse (P) 77   Temp (P) 97.2  F (36.2  C) (Temporal)   Resp (P) 24   Wt (P) 88.2 kg (194 lb 8 oz)   SpO2 (P) 97%   BMI (P) 32.37 kg/m    GENERAL APPEARANCE: healthy, alert and no distress  HENT: ear canals and TM's normal and nose  and mouth without ulcers or lesions  RESP: lungs clear to auscultation - no rales, rhonchi or wheezes  CV: regular rate and rhythm, normal S1 S2, no S3 or S4 and no murmur, click or rub   ABDOMEN: soft, nontender, no HSM or masses and bowel sounds normal  NEURO: Normal strength and tone, sensory exam grossly normal, mentation intact and speech normal    DIAGNOSTICS:   No labs or EKG required for low risk surgery (cataract, skin procedure, breast biopsy, etc)    Recent Labs   Lab Test 10/10/18  0752 04/20/18  1425 04/03/18  1005 03/23/18  2105 09/18/17  1458 09/14/17  1626   HGB  --   --   --  13.3 11.6* 10.5*   PLT  --   --   --  225  --  263   *  --   --  142  --  144   POTASSIUM 3.7 4.1  --  3.8  --  3.5   CR 1.11*  --   --  0.82 1.16* 1.08*   A1C 5.7*  --  6.2  --   --   --         IMPRESSION:   Reason for surgery/procedure: .INJECT EPIDURAL LUMBAR right foraminal narrowing severe at L4-L5 and moderate at L5-S1  Diagnosis/reason for consult: No medical contraindication noted to proceeding as planned    The proposed surgical procedure is considered LOW risk.    REVISED CARDIAC RISK INDEX  The patient has the following serious cardiovascular risks for perioperative complications such as (MI, PE, VFib and 3  AV Block):  No serious cardiac risks  INTERPRETATION: 0 risks: Class I (very low risk - 0.4% complication rate)    The patient has the following additional risks for perioperative complications:  No identified additional risks      ICD-10-CM    1. Preop general physical exam Z01.818    2. DDD (degenerative disc disease), lumbar M51.36    3. Sciatica of right side M54.31        RECOMMENDATIONS:       --Patient is to take all scheduled medications on the day of surgery EXCEPT for modifications listed below.    Diabetes Medication Use  -----Hold usual oral and non-insulin diabetic meds (e.g. Metformin, Actos, Glipizide) while NPO.       ACE Inhibitor or Angiotensin Receptor Blocker (ARB) Use  Ace inhibitor or  Angiotensin Receptor Blocker (ARB) and should HOLD this medication for the 24 hours prior to surgery.      APPROVAL GIVEN to proceed with proposed procedure, without further diagnostic evaluation      In addition to the preop physical she mentioned her visits with gastroenterology for ongoing diarrhea.  Her diarrhea could be related to metformin.  On reviewing her hemoglobin A1c, she has had excellent control, typically right around 6, this last time it was 5.7.  We will have her decrease her Glucophage to 500 mg twice daily for 1 week, then just 500 mg daily at supper.  She will follow-up with me in 1 month, at that time we will recheck hemoglobin A1c.  She is going to check her blood sugar at home  as well, and bring in a record of those readings at her next appointment.  we also flushed her right ear for cerumen.  The ear canal was then noted to be clear and noninflamed.  The TM is pearly gray with a sharp light reflex           Signed Electronically by: MAYI Anthony CNP    Copy of this evaluation report is provided to requesting physician.    [facility] Preop Guidelines    Revised Cardiac Risk Index

## 2018-12-27 ENCOUNTER — ANESTHESIA (OUTPATIENT)
Dept: SURGERY | Facility: CLINIC | Age: 78
End: 2018-12-27
Payer: MEDICARE

## 2018-12-27 ENCOUNTER — TELEPHONE (OUTPATIENT)
Dept: FAMILY MEDICINE | Facility: CLINIC | Age: 78
End: 2018-12-27

## 2018-12-27 ENCOUNTER — ANESTHESIA EVENT (OUTPATIENT)
Dept: SURGERY | Facility: CLINIC | Age: 78
End: 2018-12-27
Payer: MEDICARE

## 2018-12-27 ENCOUNTER — HOSPITAL ENCOUNTER (OUTPATIENT)
Facility: CLINIC | Age: 78
Discharge: HOME | End: 2018-12-27
Attending: ANESTHESIOLOGY | Admitting: ANESTHESIOLOGY
Payer: MEDICARE

## 2018-12-27 ENCOUNTER — HOSPITAL ENCOUNTER (OUTPATIENT)
Dept: GENERAL RADIOLOGY | Facility: CLINIC | Age: 78
End: 2018-12-27
Attending: ANESTHESIOLOGY | Admitting: ANESTHESIOLOGY
Payer: MEDICARE

## 2018-12-27 VITALS
DIASTOLIC BLOOD PRESSURE: 84 MMHG | RESPIRATION RATE: 16 BRPM | SYSTOLIC BLOOD PRESSURE: 154 MMHG | OXYGEN SATURATION: 95 % | TEMPERATURE: 99.1 F | HEART RATE: 70 BPM

## 2018-12-27 DIAGNOSIS — M54.41 CHRONIC RIGHT-SIDED LOW BACK PAIN WITH RIGHT-SIDED SCIATICA: ICD-10-CM

## 2018-12-27 DIAGNOSIS — M51.369 LUMBAR DEGENERATIVE DISC DISEASE: ICD-10-CM

## 2018-12-27 DIAGNOSIS — G89.29 CHRONIC RIGHT-SIDED LOW BACK PAIN WITH RIGHT-SIDED SCIATICA: ICD-10-CM

## 2018-12-27 PROCEDURE — 64484 NJX AA&/STRD TFRM EPI L/S EA: CPT | Mod: RT | Performed by: ANESTHESIOLOGY

## 2018-12-27 PROCEDURE — 37000008 ZZH ANESTHESIA TECHNICAL FEE, 1ST 30 MIN: Performed by: ANESTHESIOLOGY

## 2018-12-27 PROCEDURE — 25000128 H RX IP 250 OP 636: Performed by: ANESTHESIOLOGY

## 2018-12-27 PROCEDURE — 25000128 H RX IP 250 OP 636: Performed by: NURSE ANESTHETIST, CERTIFIED REGISTERED

## 2018-12-27 PROCEDURE — 25000125 ZZHC RX 250: Performed by: NURSE ANESTHETIST, CERTIFIED REGISTERED

## 2018-12-27 PROCEDURE — 64483 NJX AA&/STRD TFRM EPI L/S 1: CPT | Mod: RT | Performed by: ANESTHESIOLOGY

## 2018-12-27 PROCEDURE — 40000277 XR SURGERY CARM FLUORO LESS THAN 5 MIN W STILLS: Mod: TC

## 2018-12-27 PROCEDURE — 62323 NJX INTERLAMINAR LMBR/SAC: CPT | Performed by: ANESTHESIOLOGY

## 2018-12-27 PROCEDURE — 64484 NJX AA&/STRD TFRM EPI L/S EA: CPT | Mod: 50

## 2018-12-27 RX ORDER — FENTANYL CITRATE 50 UG/ML
25-50 INJECTION, SOLUTION INTRAMUSCULAR; INTRAVENOUS
Status: DISCONTINUED | OUTPATIENT
Start: 2018-12-27 | End: 2018-12-27 | Stop reason: HOSPADM

## 2018-12-27 RX ORDER — PROPOFOL 10 MG/ML
INJECTION, EMULSION INTRAVENOUS PRN
Status: DISCONTINUED | OUTPATIENT
Start: 2018-12-27 | End: 2018-12-27

## 2018-12-27 RX ORDER — IOPAMIDOL 612 MG/ML
INJECTION, SOLUTION INTRATHECAL PRN
Status: DISCONTINUED | OUTPATIENT
Start: 2018-12-27 | End: 2018-12-27 | Stop reason: HOSPADM

## 2018-12-27 RX ORDER — SODIUM CHLORIDE, SODIUM LACTATE, POTASSIUM CHLORIDE, CALCIUM CHLORIDE 600; 310; 30; 20 MG/100ML; MG/100ML; MG/100ML; MG/100ML
INJECTION, SOLUTION INTRAVENOUS CONTINUOUS
Status: DISCONTINUED | OUTPATIENT
Start: 2018-12-27 | End: 2018-12-27 | Stop reason: HOSPADM

## 2018-12-27 RX ORDER — ONDANSETRON 2 MG/ML
4 INJECTION INTRAMUSCULAR; INTRAVENOUS EVERY 30 MIN PRN
Status: DISCONTINUED | OUTPATIENT
Start: 2018-12-27 | End: 2018-12-27 | Stop reason: HOSPADM

## 2018-12-27 RX ORDER — LIDOCAINE HYDROCHLORIDE 20 MG/ML
INJECTION, SOLUTION INFILTRATION; PERINEURAL PRN
Status: DISCONTINUED | OUTPATIENT
Start: 2018-12-27 | End: 2018-12-27

## 2018-12-27 RX ORDER — TRIAMCINOLONE ACETONIDE 40 MG/ML
INJECTION, SUSPENSION INTRA-ARTICULAR; INTRAMUSCULAR PRN
Status: DISCONTINUED | OUTPATIENT
Start: 2018-12-27 | End: 2018-12-27 | Stop reason: HOSPADM

## 2018-12-27 RX ORDER — ONDANSETRON 4 MG/1
4 TABLET, ORALLY DISINTEGRATING ORAL EVERY 30 MIN PRN
Status: DISCONTINUED | OUTPATIENT
Start: 2018-12-27 | End: 2018-12-27 | Stop reason: HOSPADM

## 2018-12-27 RX ORDER — NALOXONE HYDROCHLORIDE 0.4 MG/ML
.1-.4 INJECTION, SOLUTION INTRAMUSCULAR; INTRAVENOUS; SUBCUTANEOUS
Status: DISCONTINUED | OUTPATIENT
Start: 2018-12-27 | End: 2018-12-27 | Stop reason: HOSPADM

## 2018-12-27 RX ADMIN — PROPOFOL 50 MG: 10 INJECTION, EMULSION INTRAVENOUS at 11:34

## 2018-12-27 RX ADMIN — PROPOFOL 50 MG: 10 INJECTION, EMULSION INTRAVENOUS at 11:31

## 2018-12-27 RX ADMIN — PROPOFOL 40 MG: 10 INJECTION, EMULSION INTRAVENOUS at 11:40

## 2018-12-27 RX ADMIN — PROPOFOL 20 MG: 10 INJECTION, EMULSION INTRAVENOUS at 11:37

## 2018-12-27 RX ADMIN — LIDOCAINE HYDROCHLORIDE 40 MG: 20 INJECTION, SOLUTION INFILTRATION; PERINEURAL at 11:31

## 2018-12-27 NOTE — ANESTHESIA CARE TRANSFER NOTE
Patient: Mya Hui    Procedure(s):  INJECT EPIDURAL LUMBAR right foraminal narrowing severe at L4-L5 and moderate at L5-S1    Diagnosis: ,Chronic right-sided low back pain with right-sided sciatica,Lumbar degenerative disc disease  Diagnosis Additional Information: No value filed.    Anesthesia Type:   MAC     Note:  Airway :Room Air  Patient transferred to:Phase II  Handoff Report: Identifed the Patient, Identified the Reponsible Provider, Reviewed the pertinent medical history, Discussed the surgical course, Reviewed Intra-OP anesthesia mangement and issues during anesthesia, Set expectations for post-procedure period and Allowed opportunity for questions and acknowledgement of understanding      Vitals: (Last set prior to Anesthesia Care Transfer)    CRNA VITALS  12/27/2018 1115 - 12/27/2018 1150      12/27/2018             Pulse:  73    SpO2:  93 %    Resp Rate (observed):  25                Electronically Signed By: MAYI Carcamo CRNA  December 27, 2018  11:50 AM

## 2018-12-27 NOTE — OP NOTE
PRIMARY PROBLEM: Low back pain and right leg pains    PROCEDURE: Right L4-5 and Right L5-S1  Transforaminal Epidural Steroid Injection with fluoroscopic guidance and contrast.     PROCEDURE DETAILS: After written informed consent was obtained from the patient, the patient was escorted to the procedure room.  The patient was placed in the prone position.  A  time out  was conducted to verify patient identity, procedure to be performed, side, site, allergies and any special requirements.  The skin over the thoracolumbar region was prepped and draped in normal sterile fashion. Fluoroscopy was used to identify the neural foramen in AP view and the skin was anesthetized with 2 mL of 1% lidocaine with bicarbonate buffer. A 22-gauge Quincke spinal needle was advanced through this location and advanced under fluoroscopic guidance towards the neural foramen.  The target zone was the 6 o clock position of the pedicle.   Prior to entering the foramen, the depth of the needle was gauged with a lateral view on fluoroscopy. While still in a lateral view, the needle was slowly advanced to avoid injury to the spinal nerve.  Then, in the oblique view (approximately 28 degrees), after negative aspiration, 1.5 mL of Omnipaque contrast dye was injected revealing epidural spread without evidence of intravascular or intrathecal spread.  Then a 2.5cc solution of 20 mg of Triamcinolone in 2 mL of  Preservative-Free saline was slowly injected into the epidural space at each segment.  After injection of the medication, as the needle tip was withdrawn, it was flushed with local anesthetic.   The patient was monitored with blood pressure and pulse oximetry machines with the assistance of an RN throughout the procedure.  The patient was alert and responsive to questions throughout the procedure.   The patient tolerated the procedure well and was observed in the post-procedural area.  The patient was dismissed without apparent complications.      DIAGNOSIS:  1. Right lumbar radiculopathy    PLAN:  1. Performed Right L4-5 and right L5-S1  transforaminal epidural steroid injection.  2. The patient was instructed to follow-up per Dr. Mcclure's instructions.      Gilbert Mcclure MD  Diplomate of the American Board of Anesthesiology, Pain Medicine

## 2018-12-27 NOTE — TELEPHONE ENCOUNTER
Call out to pt who questioning what dose of Metformin she should be taking, she was taking 1 tab in AM and 2 Pm then one week of 500 MG BID. Pt was advised of the following:  Per Chanell Nelson:  decrease her Glucophage to 500 mg twice daily for 1 week, then just 500 mg daily at supper.  She will follow-up with me in 1 month, at that time we will recheck hemoglobin A1c.     Pt verbalized understanding.

## 2018-12-27 NOTE — ANESTHESIA POSTPROCEDURE EVALUATION
Patient: Mya Hui    Procedure(s):  INJECT EPIDURAL LUMBAR right foraminal narrowing severe at L4-L5 and moderate at L5-S1    Diagnosis:,Chronic right-sided low back pain with right-sided sciatica,Lumbar degenerative disc disease  Diagnosis Additional Information: No value filed.    Anesthesia Type:  MAC    Note:  Anesthesia Post Evaluation    Patient location during evaluation: Phase 2  Patient participation: Able to fully participate in evaluation  Level of consciousness: awake  Pain management: adequate  Airway patency: patent  Cardiovascular status: acceptable and hemodynamically stable  Respiratory status: acceptable, room air and nonlabored ventilation  Hydration status: stable  PONV: none     Anesthetic complications: None    Comments: Patient was happy with the anesthesia care received and no anesthesia related complications were noted.  I will follow up with the patient again if it is needed.        Last vitals:  Vitals:    12/27/18 1056 12/27/18 1147   BP: (!) 178/100 (!) 143/94   Pulse:  68   Resp: 18 16   Temp: 99.1  F (37.3  C)    SpO2:  93%         Electronically Signed By: MAYI Carcamo CRNA  December 27, 2018  12:01 PM

## 2018-12-27 NOTE — ANESTHESIA PREPROCEDURE EVALUATION
Anesthesia Pre-Procedure Evaluation    Patient: Mya Hui   MRN: [id number] : 1940          Preoperative Diagnosis: ,Chronic right-sided low back pain with right-sided sciatica,Lumbar degenerative disc disease    Procedure(s):  INJECT EPIDURAL LUMBAR right foraminal narrowing severe at L4-L5 and moderate at L5-S1    Past Medical History:   Diagnosis Date     Acute cholecystitis 3/23/2018     Acute kidney injury (H) 9/10/2017     Arthritis      Dehydration 2017     Diabetes type 2, controlled (H)      Elevated lactic acid level 9/10/2017     GERD (gastroesophageal reflux disease)      History of renal calculi      HTN, goal below 140/90      Hyperlipidaemia LDL goal < 100      Hypokalemia 9/3/2017     Hypothyroid      Insomnia      Left carotid stenosis      Migraine      Sciatica of right side 2013     Past Surgical History:   Procedure Laterality Date     BUNIONECTOMY      Right foot     CATARACT IOL, RT/LT Bilateral      COLONOSCOPY  2013    Procedure: COLONOSCOPY;  Colonoscopy;  Surgeon: Giovany Espinoza MD;  Location: [facility]     FRACTURE TX, ANKLE RT/LT      Left ankle     HC CYSTOURETHROSCOPY W/ URETEROSCOPY &/OR PYELOSCOPY; W/ LITHOTRIPSY       HC REVISE MEDIAN N/CARPAL TUNNEL SURG      Right wrist     INJECT JOINT SACROILIAC  4/10/2014    Procedure: INJECT JOINT SACROILIAC;  Sacroiliac Joint Injection;  Surgeon: Gilbert Mcclure MD;  Location: [facility]     LAPAROSCOPIC CHOLECYSTECTOMY N/A 3/24/2018    Procedure: LAPAROSCOPIC CHOLECYSTECTOMY;  Laparoscopic Cholecystectomy;  Surgeon: Berry Allen DO;  Location: [facility]       Anesthesia Evaluation     . Pt has had prior anesthetic. Type: General and MAC           ROS/MED HX    ENT/Pulmonary:  - neg pulmonary ROS     Neurologic:  - neg neurologic ROS     Cardiovascular:     (+) Dyslipidemia, hypertension-range: < 140 / 90, ---. : . . . :. . Previous cardiac testing date:results:date: results:ECG reviewed date:3-23-18  results:Sinus Bradycardia   - Nonspecific T-abnormality. date: results:          METS/Exercise Tolerance:     Hematologic:     (+) Other Hematologic Disorder-Left carotid stenosis (asymptomatic)      Musculoskeletal:   (+) , , other musculoskeletal- Right sided sciatica      GI/Hepatic:     (+) GERD Asymptomatic on medication,       Renal/Genitourinary:     (+) Nephrolithiasis ,       Endo:     (+) type II DM Last HgA1c: 6.3 date: 9/27/13 Not using insulin - not using insulin pump Normal glucose range: 110-130 not previously admitted for DM/DKA Diabetic complications: neuropathy, thyroid problem hypothyroidism, .      Psychiatric:  - neg psychiatric ROS       Infectious Disease:   (+) MRSA,       Malignancy:         Other:    (+) No chance of pregnancy C-spine cleared: N/A, H/O Chronic Pain,no other significant disability                         Physical Exam  Normal systems: cardiovascular, pulmonary and dental    Airway   Mallampati: II  TM distance: >3 FB  Neck ROM: full    Dental     Cardiovascular   Rhythm and rate: regular and normal  (-) no murmur    Pulmonary    breath sounds clear to auscultation            Lab Results   Component Value Date    WBC 8.1 03/23/2018    HGB 13.3 03/23/2018    HCT 41.0 03/23/2018     03/23/2018    CRP <2.9 11/27/2018    SED 18 09/18/2017     (H) 10/10/2018    POTASSIUM 3.7 10/10/2018    CHLORIDE 111 (H) 10/10/2018    CO2 28 10/10/2018    BUN 18 10/10/2018    CR 1.11 (H) 10/10/2018     (H) 10/10/2018    TERESA 8.9 10/10/2018    PHOS 3.3 09/03/2017    MAG 2.2 09/05/2017    ALBUMIN 3.7 11/27/2018    PROTTOTAL 6.6 (L) 11/27/2018    ALT 26 11/27/2018    AST 21 11/27/2018    ALKPHOS 54 11/27/2018    BILITOTAL 0.4 11/27/2018    LIPASE 124 03/23/2018    TSH 3.84 10/10/2018       Preop Vitals  BP Readings from Last 3 Encounters:   12/21/18 (P) 146/86   12/14/18 (!) 142/92   12/06/18 132/86    Pulse Readings from Last 3 Encounters:   12/21/18 (P) 77   11/27/18 70  "  10/10/18 88      Resp Readings from Last 3 Encounters:   12/21/18 (P) 24   10/10/18 24   09/26/18 26    SpO2 Readings from Last 3 Encounters:   12/21/18 (P) 97%   11/27/18 96%   10/10/18 97%      Temp Readings from Last 1 Encounters:   12/21/18 (P) 97.2  F (36.2  C) ((P) Temporal)    Ht Readings from Last 1 Encounters:   12/14/18 1.651 m (5' 5\")      Wt Readings from Last 1 Encounters:   12/21/18 (P) 88.2 kg (194 lb 8 oz)    Estimated body mass index is 32.37 kg/m  (pended) as calculated from the following:    Height as of 12/14/18: 1.651 m (5' 5\").    Weight as of 12/21/18: (P) 88.2 kg (194 lb 8 oz).       Anesthesia Plan      History & Physical Review  History and physical reviewed and following examination; no interval change.    ASA Status:  2 .    NPO Status:  > 8 hours    Plan for MAC with Propofol induction. Maintenance will be TIVA.  Reason for MAC:  Deep or markedly invasive procedure (G8)  PONV prophylaxis:  Ondansetron (or other 5HT-3) and Dexamethasone or Solumedrol       Postoperative Care  Postoperative pain management:  IV analgesics and Oral pain medications.      Consents  Anesthetic plan, risks, benefits and alternatives discussed with:  Patient or representative and Patient.  Use of blood products discussed: No .   .                 MAYI Warner CRNA  "

## 2018-12-27 NOTE — DISCHARGE INSTRUCTIONS
Home Care Instructions                Procedure:  Epidural Steroid Injection or Joint injection    Activity:    Rest today    Do not work today    Resume normal activity tomorrow    Pain:    You may experience soreness at the injection site for one or two days    You may use an ice pack for 20 minutes every 2 hours for the first 24 hours    You may use a heating pad after the first 24 hours    You may use Tylenol  (acetaminophen) every 4 hours or other pain medicines as directed by your physician    Safety  Sedation medicine, if given may remain active for many hours.    It is important for the next 24 hours that you do not:    Drive a car    Operate machines or power tools    Consume alcohol, including beer    Sign any important papers or legal documents    You may experience numbness radiating into your legs or arms, (depending on the procedure location)  This numbness may last several hours.  Until the numb sensation returns to normal please use caution in walking, climbing stairs, stepping out of your vehicle, etc.    Common side effects of steroids:  Not everyone will experience corticosteroid side effects. If side effects are experienced they will gradually subside in the 7-10 day period following an injection.    Most common side effects include:    Flushed face and/or chest    Feeling of warmth, particularly in face but could be overall feeling of warmth    Increased blood sugar in diabetic patients    Menstrual irregularities may occur.  If taking hormone based birth control an alternate method of birth control is recommended    Sleep disturbances and/or mood swings are possible    Leg cramps    Please contact us if you have:  Severe pain   Fever more than 101.5 degrees Fahrenheit  Signs of infection (redness, swelling or drainage)      If you have questions during normal business hours (8am-5pm Monday-Friday) contact the [facility] at [phone]. If you need help after hours, we recommend that  you go to a hospital emergency room or dial 911.    [facility]   Adult Discharge Orders & Instructions     For 24 hours after surgery    1. Get plenty of rest.  A responsible adult must stay with you for at least 24 hours after you leave the hospital.   2. Do not drive or use heavy equipment.  If you have weakness or tingling, don't drive or use heavy equipment until this feeling goes away.  3. Do not drink alcohol.  4. Avoid strenuous or risky activities.  Ask for help when climbing stairs.   5. You may feel lightheaded.  If so, sit for a few minutes before standing.  Have someone help you get up.   6. You may have a slight fever. Call the doctor if your fever is over 100 F (37.7 C) (taken under the tongue) or lasts longer than 24 hours.  7. You may have a dry mouth, a sore throat, muscle aches or trouble sleeping.  These should go away after 24 hours.  8. Do not make important or legal decisions.  Based on the surgery/procedure that you had today, we do not expect that you will have any problems.  However, we want you to know what to do if you have pain, nausea, bleeding,or infection:  To control pain:  Take medicines your physician has prescribed or or over-the counter medicine he or she advises.  Ice packs and periods of rest are often helpful.  For surgery on an arm or leg, raise it on a pillow to ease swelling.  If your pain is not managed with the above methods, contact your physician.  To control nausea:  Take anti-nausea medicine approved by your physician.  Drink clear liquids such as apple juice, ginger ale, broth or 7-Up. Be sure to drink enough fluids.  Move to a regular diet as you feel able.  Rest may also help.  Bleeding:  You may see a little blood on your dressing, about the size of a quarter in the first 24 hours.  If you see this, there is no reason to be alarmed.  However, if this continues to increase in size, apply pressure if able, and notify your  physician.  Infection: Please contact your physician if you have any of the following signs:  redness, swelling, heat, increasing pain or foul-smelling drainage at your surgery site, fever or chills.    Call your doctor for any of the followin.  It has been over 8 to 10 hours since surgery and you are still not able to urinate (pass water).    2.  Headache for over 24 hours.    3.  Numbness, tingling or weakness in your legs the day after surgery (if you had spinal anesthesia).    Nurse advice line: [phone]

## 2018-12-29 DIAGNOSIS — E87.6 HYPOKALEMIA: ICD-10-CM

## 2018-12-31 RX ORDER — POTASSIUM CHLORIDE 750 MG/1
TABLET, EXTENDED RELEASE ORAL
Qty: 90 TABLET | Refills: 11 | Status: SHIPPED | OUTPATIENT
Start: 2018-12-31 | End: 2020-01-16

## 2018-12-31 NOTE — TELEPHONE ENCOUNTER
"potassium chloride  Last Written Prescription Date:  01/08/2018  Last Fill Quantity: 90,  # refills: 11   Last office visit: 12/21/2018 with prescribing provider:  0   Future Office Visit:   Next 5 appointments (look out 90 days)    Jan 23, 2019  1:30 PM CST  Office Visit with MAYI [facility] ([facility]) [address] [place] [id number] [phone]   Feb 05, 2019 10:00 AM CST  Return Visit with Efren Osorio MD  [facility] [address] [place] [id number] [phone]           Requested Prescriptions   Pending Prescriptions Disp Refills     potassium chloride ER (K-TAB/KLOR-CON) 10 MEQ CR tablet [Pharmacy Med Name: POTASSIUM CHL ER 10MEQ TBCR] 90 tablet 11     Sig: TAKE ONE TABLET BY MOUTH THREE TIMES A DAY    Potassium Supplements Protocol Passed - 12/29/2018  1:03 AM       Passed - Recent (12 mo) or future (30 days) visit within the authorizing provider's specialty    Patient had office visit in the last 12 months or has a visit in the next 30 days with authorizing provider or within the authorizing provider's specialty.  See \"Patient Info\" tab in inbasket, or \"Choose Columns\" in Meds & Orders section of the refill encounter.             Passed - Patient is age 18 or older       Passed - Normal serum potassium in past 12 months    Recent Labs   Lab Test 10/10/18  0752   POTASSIUM 3.7                      Prescription approved per [facility] Protocol.      Dorina Johns RN on 12/31/2018 at 3:40 PM    "

## 2019-01-08 ENCOUNTER — TRANSFERRED RECORDS (OUTPATIENT)
Dept: HEALTH INFORMATION MANAGEMENT | Facility: CLINIC | Age: 79
End: 2019-01-08

## 2019-01-11 ENCOUNTER — TELEPHONE (OUTPATIENT)
Dept: GASTROENTEROLOGY | Facility: CLINIC | Age: 79
End: 2019-01-11

## 2019-01-11 NOTE — TELEPHONE ENCOUNTER
[facility] Call Center    Phone Message    May a detailed message be left on voicemail: no    Reason for Call: Symptoms or Concerns     If patient has red-flag symptoms, warm transfer to triage line    Current symptom or concern: Worsening symptoms.  Taking Metamucil and Imodium.  Diarrhea. Very severe.      Symptoms have been present for:  Pt was constipated and now she can't control or stop the diarrhea.  Yesterday symptoms happened twice and she can't stop stop the diarrhea.  She cannot leave her apartment.  Pt asking for a call back this morning.     Has patient previously been seen for this? Yes    By Devon:    Date: TODAY    Are there any new or worsening symptoms? Yes:       Action Taken: Message routed to:  Adult Clinics: Gastroenterology (GI) p [id number]

## 2019-01-14 ENCOUNTER — TELEPHONE (OUTPATIENT)
Dept: GASTROENTEROLOGY | Facility: CLINIC | Age: 79
End: 2019-01-14

## 2019-01-14 DIAGNOSIS — R19.7 DIARRHEA, UNSPECIFIED TYPE: Primary | ICD-10-CM

## 2019-01-14 NOTE — TELEPHONE ENCOUNTER
Telephone note    I discussed with patient's daughter April.  The patient had been taking Imodium daily and to become constipated, now having frequent loose stools.  I reviewed her last note as well as her lab data.  She does have a Fecal Calprotectin of 178.  Considerations include overflow diarrhea, microscopic colitis or IBD.  I recommend that she get a x-ray of the abdomen.  The patient has wanted to avoid colonoscopy previously.  If the x-ray does not suggest overflow diarrhea, then I think that it is necessary to proceed with colonoscopy.  The daughter will discuss this with the patient.  Follow-up is scheduled in the office in a few weeks.    Efren Osorio MD

## 2019-01-14 NOTE — TELEPHONE ENCOUNTER
Pt had called the call centre on Friday, complaining of the below symptoms. Message never routed to GI in-basket. Pt daughter calling on behalf of patient this morning (per pt request, consent to communicate on file), with the same symptoms. Pt has been taking imodium with no relief. Pt very concerned since she has trouble making it to the bathroom. Made daughter April aware that I will forward these messages to Dr. Osorio to advise, and if he does not get back to us within the next 1-2 hours I will call him. Apologized to pt daughter for having this been left over the weekend. Daughter April states please call her back with word from Dr. Osorio as the pt is stressed. Number to call, [phone].     Sarah Carrasco LPN

## 2019-01-14 NOTE — TELEPHONE ENCOUNTER
Message received from Dr. Osorio:    Telephone note-     I discussed with patient's daughter April.  The patient had been taking Imodium daily and to become constipated, now having frequent loose stools.  I reviewed her last note as well as her lab data.  She does have a Fecal Calprotectin of 178.  Considerations include overflow diarrhea, microscopic colitis or IBD.  I recommend that she get a x-ray of the abdomen.  The patient has wanted to avoid colonoscopy previously.  If the x-ray does not suggest overflow diarrhea, then I think that it is necessary to proceed with colonoscopy.  The daughter will discuss this with the patient.  Follow-up is scheduled in the office in a few weeks.     Efren Osorio MD      Called pt to follow up, X-ray is scheduled for tomorrow. No other questions or concerns.    Sarah Carrasco LPN

## 2019-01-15 ENCOUNTER — ANCILLARY PROCEDURE (OUTPATIENT)
Dept: GENERAL RADIOLOGY | Facility: OTHER | Age: 79
End: 2019-01-15
Payer: COMMERCIAL

## 2019-01-15 ENCOUNTER — TELEPHONE (OUTPATIENT)
Dept: GASTROENTEROLOGY | Facility: CLINIC | Age: 79
End: 2019-01-15

## 2019-01-15 DIAGNOSIS — R19.7 DIARRHEA, UNSPECIFIED TYPE: ICD-10-CM

## 2019-01-15 DIAGNOSIS — R19.7 DIARRHEA, UNSPECIFIED TYPE: Primary | ICD-10-CM

## 2019-01-15 PROCEDURE — 74019 RADEX ABDOMEN 2 VIEWS: CPT

## 2019-01-15 NOTE — TELEPHONE ENCOUNTER
Please let the patient know that I have reviewed her x-ray imaging and the following are my recommendations.  There is a moderate amount of stool in the right colon.  It is possible that this is overflow diarrhea.  I suggest that we schedule for colonoscopy with GoLYTELY bowel prep.  This will ensure that we are not overlooking microscopic colitis and should clear the colon if this is overflow diarrhea.  After the colonoscopy, we can maintain her on a combination of fiber and MiraLAX daily.  She should schedule the colonoscopy and then follow-up with me in 2 weeks after the procedure.    Efren Osorio MD

## 2019-01-15 NOTE — TELEPHONE ENCOUNTER
Dr. Osorio recommendations are -    Efren Osorio MD Haapala, Nicole, LPN   Caller: Unspecified (4 days ago,  8:11 AM)            Can use fiber with metamucil wafers or citrucel at this time.  Avoid imodium completely.  No other recommendations at this time.   Efren Osorio MD      Called April and made aware of recommendations. She states will communicate these to Mya. No other questions or concerns.     Sarah Carrasco LPN

## 2019-01-15 NOTE — TELEPHONE ENCOUNTER
[facility] Call Center    Phone Message    May a detailed message be left on voicemail: no    Reason for Call: Order(s): Other:   Reason for requested: Colonoscopy order needed   Date needed: Today   Provider name: Devon.  Call daughter when available or if someone will call from referrals. Call daughter April, [phone]      Action Taken: Message routed to:  Adult Clinics: Gastroenterology (GI) p [id number]

## 2019-01-15 NOTE — TELEPHONE ENCOUNTER
April called back at this time. I explained the results to her of the X-ray and the need for a colonoscopy. Understanding was verbalized and April states she will call to schedule the colonoscopy with Dr. Osorio and then call us back to schedule a 2 week follow-up. April is concerned though, what are the recommendations for now until the colonoscopy to keep symptoms under control? April confirmed she will relay all results to Mya. No other questions or concerns.    Sarah Carrasco LPN

## 2019-01-18 NOTE — TELEPHONE ENCOUNTER
Chart reviewed, pt scheduled for colonoscopy. Writer closing encounter at this time.    Susan Sargent LPN

## 2019-01-23 ENCOUNTER — TELEPHONE (OUTPATIENT)
Dept: FAMILY MEDICINE | Facility: CLINIC | Age: 79
End: 2019-01-23

## 2019-01-23 NOTE — LETTER
[place] [id number] [facility] [address]  [phone]        Mya Hui  [address] [address]      January 29, 2019      Dear Mya,    I care about your health and have reviewed your health plan, including your medical conditions, medication list, and lab results and am making recommendations based on this review, to better manage your health.    You are in particular need of attention regarding:  -High Blood Pressure    I am recommending that you:  -schedule a NURSE-ONLY APPOINTMENT within the next 1-4 weeks for rechecking your blood pressure.    If you've had the preventative screening completed at another facility or feel you're not due for this screening, please call our clinic at the number listed above or send us a My Chart message so we can update our records. We would like to thank you in advance for taking the time to take care of your health.  If you have any questions, please don t hesitate to contact our clinic.    Sincerely,       Your [facility]

## 2019-01-23 NOTE — TELEPHONE ENCOUNTER
Attempted outreach to patient: Left message    Patient is due for:  BP check    If patient had this completed elsewhere, please obtain approximate date, clinic/hospital where test was done and the result (abnormal/normal).    Please assist in scheduling if not completed.      Panel Management Review      Patient has the following on her problem list:     Diabetes    ASA: Not Required     Last A1C  Lab Results   Component Value Date    A1C 5.7 10/10/2018    A1C 6.2 04/03/2018    A1C 6.1 09/04/2017    A1C 5.9 08/10/2017    A1C 6.1 02/14/2017     A1C tested: Passed    Last LDL:    Lab Results   Component Value Date    CHOL 143 10/10/2018     Lab Results   Component Value Date    HDL 40 10/10/2018     Lab Results   Component Value Date    LDL 60 10/10/2018     Lab Results   Component Value Date    TRIG 213 10/10/2018     Lab Results   Component Value Date    CHOLHDLRATIO 3.1 05/22/2015     Lab Results   Component Value Date    NHDL 103 10/10/2018       Is the patient on a Statin? YES             Is the patient on Aspirin? NO    Medications     HMG CoA Reductase Inhibitors    pravastatin (PRAVACHOL) 40 MG tablet          Last three blood pressure readings:  BP Readings from Last 3 Encounters:   12/27/18 154/84   12/21/18 (P) 146/86   12/14/18 (!) 142/92       Date of last diabetes office visit: 10/18     Tobacco History:     History   Smoking Status     Never Smoker   Smokeless Tobacco     Never Used         Hypertension   Last three blood pressure readings:  BP Readings from Last 3 Encounters:   12/27/18 154/84   12/21/18 (P) 146/86   12/14/18 (!) 142/92     Blood pressure: FAILED    HTN Guidelines:  Age 18-59 BP range:  Less than 140/90  Age 60-85 with Diabetes:  Less than 140/90  Age 60-85 without Diabetes:  less than 150/90      Composite cancer screening  Chart review shows that this patient is due/due soon for the following None  Summary:    Patient is due/failing the following:   BP CHECK    Action needed:    Patient needs nurse only appointment.    Type of outreach:    Phone, left message for patient to call back.     Questions for provider review:    None                                                                                                                                    ACase/MA

## 2019-01-31 ENCOUNTER — SURGERY (OUTPATIENT)
Age: 79
End: 2019-01-31
Payer: COMMERCIAL

## 2019-01-31 ENCOUNTER — HOSPITAL ENCOUNTER (OUTPATIENT)
Facility: AMBULATORY SURGERY CENTER | Age: 79
Discharge: HOME | End: 2019-01-31
Attending: INTERNAL MEDICINE | Admitting: INTERNAL MEDICINE
Payer: COMMERCIAL

## 2019-01-31 VITALS
SYSTOLIC BLOOD PRESSURE: 158 MMHG | TEMPERATURE: 98.1 F | DIASTOLIC BLOOD PRESSURE: 90 MMHG | OXYGEN SATURATION: 92 % | RESPIRATION RATE: 16 BRPM | HEART RATE: 74 BPM

## 2019-01-31 LAB
COLONOSCOPY: NORMAL
GLUCOSE BLDC GLUCOMTR-MCNC: 110 MG/DL (ref 70–99)

## 2019-01-31 PROCEDURE — 45385 COLONOSCOPY W/LESION REMOVAL: CPT

## 2019-01-31 PROCEDURE — G8918 PT W/O PREOP ORDER IV AB PRO: HCPCS

## 2019-01-31 PROCEDURE — 88305 TISSUE EXAM BY PATHOLOGIST: CPT | Performed by: INTERNAL MEDICINE

## 2019-01-31 PROCEDURE — G8907 PT DOC NO EVENTS ON DISCHARG: HCPCS

## 2019-01-31 PROCEDURE — 45385 COLONOSCOPY W/LESION REMOVAL: CPT | Performed by: INTERNAL MEDICINE

## 2019-01-31 PROCEDURE — 45380 COLONOSCOPY AND BIOPSY: CPT | Mod: XS

## 2019-01-31 PROCEDURE — 45380 COLONOSCOPY AND BIOPSY: CPT | Mod: 59 | Performed by: INTERNAL MEDICINE

## 2019-01-31 RX ORDER — ONDANSETRON 2 MG/ML
4 INJECTION INTRAMUSCULAR; INTRAVENOUS
Status: COMPLETED | OUTPATIENT
Start: 2019-01-31 | End: 2019-01-31

## 2019-01-31 RX ORDER — FENTANYL CITRATE 50 UG/ML
INJECTION, SOLUTION INTRAMUSCULAR; INTRAVENOUS PRN
Status: DISCONTINUED | OUTPATIENT
Start: 2019-01-31 | End: 2019-01-31 | Stop reason: HOSPADM

## 2019-01-31 RX ORDER — LIDOCAINE 40 MG/G
CREAM TOPICAL
Status: DISCONTINUED | OUTPATIENT
Start: 2019-01-31 | End: 2019-02-01 | Stop reason: HOSPADM

## 2019-01-31 RX ORDER — SCOLOPAMINE TRANSDERMAL SYSTEM 1 MG/1
1 PATCH, EXTENDED RELEASE TRANSDERMAL
Status: DISCONTINUED | OUTPATIENT
Start: 2019-01-31 | End: 2019-02-01 | Stop reason: HOSPADM

## 2019-01-31 RX ORDER — ACETAMINOPHEN 325 MG/1
650 TABLET ORAL ONCE
Status: COMPLETED | OUTPATIENT
Start: 2019-01-31 | End: 2019-01-31

## 2019-01-31 RX ADMIN — ACETAMINOPHEN 650 MG: 325 TABLET ORAL at 10:40

## 2019-01-31 RX ADMIN — FENTANYL CITRATE 25 MCG: 50 INJECTION, SOLUTION INTRAMUSCULAR; INTRAVENOUS at 09:44

## 2019-01-31 RX ADMIN — ONDANSETRON 4 MG: 2 INJECTION INTRAMUSCULAR; INTRAVENOUS at 09:06

## 2019-02-02 DIAGNOSIS — I10 ESSENTIAL HYPERTENSION WITH GOAL BLOOD PRESSURE LESS THAN 140/90: ICD-10-CM

## 2019-02-04 LAB — COPATH REPORT: NORMAL

## 2019-02-04 NOTE — TELEPHONE ENCOUNTER
"Routing refill request to provider for review/approval because:  Labs out of range:  Cr  Bp's > goal range     lisinopril  Last Written Prescription Date:  12/5/2018  Last Fill Quantity: 60,  # refills: 0   Last office visit: 12/21/2018 with prescribing provider:  12/21/2018   Future Office Visit:   Next 5 appointments (look out 90 days)    Feb 19, 2019 10:00 AM CST  [facility] [address] [place] [id number] [phone]           Requested Prescriptions   Pending Prescriptions Disp Refills     lisinopril (PRINIVIL/ZESTRIL) 20 MG tablet [Pharmacy Med Name: LISINOPRIL 20MG TABS] 60 tablet 0     Sig: TAKE ONE TABLET BY MOUTH ONCE DAILY    ACE Inhibitors (Including Combos) Protocol Failed - 2/2/2019 12:53 PM       Failed - Blood pressure under 140/90 in past 12 months    BP Readings from Last 3 Encounters:   01/31/19 158/90   12/27/18 154/84   12/21/18 (P) 146/86                Failed - Normal serum creatinine on file in past 12 months    Recent Labs   Lab Test 10/10/18  0752   CR 1.11*            Passed - Recent (12 mo) or future (30 days) visit within the authorizing provider's specialty    Patient had office visit in the last 12 months or has a visit in the next 30 days with authorizing provider or within the authorizing provider's specialty.  See \"Patient Info\" tab in inbasket, or \"Choose Columns\" in Meds & Orders section of the refill encounter.             Passed - Medication is active on med list       Passed - Patient is age 18 or older       Passed - No active pregnancy on record       Passed - Normal serum potassium on file in past 12 months    Recent Labs   Lab Test 10/10/18  0752   POTASSIUM 3.7            Passed - No positive pregnancy test within past 12 months          Sarah Portillo RN on 2/4/2019 at 3:21 PM    "

## 2019-02-05 RX ORDER — LISINOPRIL 20 MG/1
TABLET ORAL
Qty: 60 TABLET | Refills: 0 | Status: SHIPPED | OUTPATIENT
Start: 2019-02-05 | End: 2019-03-14 | Stop reason: DRUGHIGH

## 2019-02-05 NOTE — RESULT ENCOUNTER NOTE
Biopsies are negative for microscopic colitis.  The colon polyp was an adenoma.  I would not recommend any additional surveillance colonoscopies from a colorectal cancer screening perspective.  Plan to follow-up in the office as scheduled.

## 2019-02-11 DIAGNOSIS — N32.81 OAB (OVERACTIVE BLADDER): ICD-10-CM

## 2019-02-12 RX ORDER — OXYBUTYNIN CHLORIDE 5 MG/1
10 TABLET ORAL DAILY
Qty: 240 TABLET | Refills: 5 | Status: SHIPPED | OUTPATIENT
Start: 2019-02-12 | End: 2019-04-02

## 2019-02-19 ENCOUNTER — OFFICE VISIT (OUTPATIENT)
Dept: GASTROENTEROLOGY | Facility: CLINIC | Age: 79
End: 2019-02-19
Payer: COMMERCIAL

## 2019-02-19 VITALS
OXYGEN SATURATION: 98 % | WEIGHT: 195.8 LBS | SYSTOLIC BLOOD PRESSURE: 154 MMHG | HEIGHT: 65 IN | BODY MASS INDEX: 32.62 KG/M2 | DIASTOLIC BLOOD PRESSURE: 87 MMHG | HEART RATE: 67 BPM

## 2019-02-19 DIAGNOSIS — R19.7 DIARRHEA, UNSPECIFIED TYPE: Primary | ICD-10-CM

## 2019-02-19 PROCEDURE — 99213 OFFICE O/P EST LOW 20 MIN: CPT | Performed by: INTERNAL MEDICINE

## 2019-02-19 RX ORDER — CHOLESTYRAMINE 4 G/9G
4 POWDER, FOR SUSPENSION ORAL 2 TIMES DAILY WITH MEALS
Qty: 378 G | Refills: 11 | Status: SHIPPED | OUTPATIENT
Start: 2019-02-19 | End: 2020-01-16

## 2019-02-19 ASSESSMENT — PAIN SCALES - GENERAL: PAINLEVEL: MODERATE PAIN (5)

## 2019-02-19 ASSESSMENT — MIFFLIN-ST. JEOR: SCORE: 1379.02

## 2019-02-19 NOTE — PATIENT INSTRUCTIONS
Discuss stopping metformin with your primary doctor as this can cause loose stools    Start cholestyramine 4g twice daily.    Continue fiber packets twice per day.    Decrease protonix to once per day.    Buddy Ott next visit in 3-4 months    Nutrition visit at the time of next appointment.

## 2019-02-19 NOTE — PROGRESS NOTES
GASTROENTEROLOGY FOLLOW UP CLINIC VISIT    CC/REFERRING MD:    Chanell Nelson    REASON FOR CONSULTATION:   No ref. provider found for   Chief Complaint   Patient presents with     RECHECK     Diarrhea two week follow up       HISTORY OF PRESENT ILLNESS:    Mya Hui is 78 year old female who presents for follow up of chronic diarrhea.  She was originally seen in the office November 2018.  She has been noted to have loose stools for 2-3 years.  Noninvasive stool testing was negative.  She has a history notable for cholecystectomy April 2018.  She underwent a colonoscopy January 2019 which was unremarkable except for a colon polyp which was a tubular adenoma.  Biopsies were negative for microscopic colitis.  She has tried different medications including loperamide in the past.  She notes that she is currently taking fiber with Metamucil wafers 2 packets/day.  She is off amitriptyline.  She is not having any abdominal pain.  There is no weight loss.  She is currently having 1-3 bowel movements per day but reports they are loose and urgent.  She notes that she finds it difficult to leave her house she is worried she will have a bowel movement.      PERTINENT PAST MEDICAL HISTORY:    Past Medical History:   Diagnosis Date     Acute cholecystitis 3/23/2018     Acute kidney injury (H) 9/10/2017     Arthritis      Dehydration 9/4/2017     Diabetes type 2, controlled (H)      Elevated lactic acid level 9/10/2017     GERD (gastroesophageal reflux disease)      History of renal calculi      HTN, goal below 140/90      Hyperlipidaemia LDL goal < 100      Hypokalemia 9/3/2017     Hypothyroid      Insomnia      Left carotid stenosis      Migraine      Motion sickness      PONV (postoperative nausea and vomiting)      Sciatica of right side 6/28/2013       PREVIOUS SURGERIES:   Past Surgical History:   Procedure Laterality Date     BUNIONECTOMY      Right foot     CATARACT IOL, RT/LT Bilateral 2017      COLONOSCOPY  7/12/2013    Procedure: COLONOSCOPY;  Colonoscopy;  Surgeon: Giovany Espinoza MD;  Location: [facility]     COLONOSCOPY N/A 1/31/2019    Procedure: Combined Colonoscopy, Single Or Multiple Biopsy/Polypectomy By Biopsy;  Surgeon: Efren Osorio MD;  Location: [facility]     COLONOSCOPY WITH CO2 INSUFFLATION N/A 1/31/2019    Procedure: COLONOSCOPY WITH CO2 INSUFFLATION;  Surgeon: Efren Osorio MD;  Location: [facility]     FRACTURE TX, ANKLE RT/LT      Left ankle     HC CYSTOURETHROSCOPY W/ URETEROSCOPY &/OR PYELOSCOPY; W/ LITHOTRIPSY       HC REVISE MEDIAN N/CARPAL TUNNEL SURG      Right wrist     INJECT EPIDURAL LUMBAR Right 12/27/2018    Procedure: INJECT EPIDURAL LUMBAR right foraminal narrowing severe at L4-L5 and moderate at L5-S1;  Surgeon: Gilbert Mcclure MD;  Location: [facility]     INJECT JOINT SACROILIAC  4/10/2014    Procedure: INJECT JOINT SACROILIAC;  Sacroiliac Joint Injection;  Surgeon: Gilbert Mcclure MD;  Location: [facility]     LAPAROSCOPIC CHOLECYSTECTOMY N/A 3/24/2018    Procedure: LAPAROSCOPIC CHOLECYSTECTOMY;  Laparoscopic Cholecystectomy;  Surgeon: Berry Allen DO;  Location: [facility]       ALLERGIES:     Allergies   Allergen Reactions     Codeine Nausea     Fentanyl Nausea and Vomiting     VERY sensitive to most narcotics if not all.       PERTINENT MEDICATIONS:    Current Outpatient Medications:      ACCU-CHEK COMPACT PLUS test strip, USE TO TEST BLOOD BLOOD SUGARS ONCE DAILY OR AS DIRECTED, Disp: 102 each, Rfl: 3     acetaminophen (TYLENOL) 325 MG tablet, Take 2 tablets (650 mg) by mouth every 4 hours as needed for mild pain, Disp: 100 tablet, Rfl:      blood glucose (NO BRAND SPECIFIED) lancets standard, Glucose test strips Use to test blood sugar 1 times daily or as directed., Disp: 1 Box, Rfl: 11     blood glucose monitoring (SOFTCLIX) lancets, USE TO TEST BLOOD SUGARS ONCE DAILY OR AS DIRECTED, Disp: 100 each, Rfl: 11     cholestyramine (QUESTRAN) 4  GM/DOSE powder, Take 4 g by mouth 2 times daily (with meals), Disp: 378 g, Rfl: 11     levothyroxine (SYNTHROID/LEVOTHROID) 75 MCG tablet, TAKE ONE TABLET BY MOUTH ONCE DAILY, Disp: 60 tablet, Rfl: 8     lisinopril (PRINIVIL/ZESTRIL) 20 MG tablet, TAKE ONE TABLET BY MOUTH ONCE DAILY, Disp: 60 tablet, Rfl: 0     metFORMIN (GLUCOPHAGE-XR) 500 MG 24 hr tablet, TAKE ONE TABLET BY MOUTH EVERY MORNING AND TAKE TWO TABLETS BY MOUTH IN THE EVENING WITH MEALS (Patient taking differently: TAKE ONE TABLET BY MOUTH EVERY MORNING AND TAKE ONE TABLETS BY MOUTH IN THE EVENING WITH MEALS), Disp: 270 tablet, Rfl: 1     Multiple Vitamins-Minerals (MULTIVITAL PO), Take  by mouth. daily, Disp: , Rfl:      oxybutynin (DITROPAN) 5 MG tablet, Take 2 tablets (10 mg) by mouth daily, Disp: 240 tablet, Rfl: 5     pantoprazole (PROTONIX) 40 MG EC tablet, Take 1 tablet (40 mg) by mouth 2 times daily Take 30-60 minutes before a meal., Disp: 180 tablet, Rfl: 2     potassium chloride ER (K-TAB/KLOR-CON) 10 MEQ CR tablet, TAKE ONE TABLET BY MOUTH THREE TIMES A DAY, Disp: 90 tablet, Rfl: 11     pravastatin (PRAVACHOL) 40 MG tablet, TAKE ONE TABLET BY MOUTH ONCE DAILY, Disp: 30 tablet, Rfl: 11    FAMILY HISTORY:   Family History   Problem Relation Age of Onset     Hypertension Brother      Cerebrovascular Disease Brother      Diabetes Father      Cardiovascular Father      Diabetes Brother         Borderline     Breast Cancer Mother      Diabetes Brother      Blood Disease Brother      Cardiovascular Brother         MI          ROS:    No fevers or chills  No weight loss  No blurry vision, double vision or change in vision  No sore throat  No lymphadenopathy  No headache, paraesthesias, or weakness in a limb  No shortness of breath or wheezing  No chest pain or pressure  No arthralgias or myalgias  No rashes or skin changes  No odynophagia or dysphagia  No BRBPR, hematochezia, melena  No dysuria, frequency or urgency  No hot/cold intolerance or  "polyria  No anxiety or depression  PHYSICAL EXAMINATION:  Constitutional: aaox3, cooperative, pleasant, not dyspneic/diaphoretic, no acute distress  Vitals reviewed: /87   Pulse 67   Ht 1.651 m (5' 5\")   Wt 88.8 kg (195 lb 12.8 oz)   SpO2 98%   BMI 32.58 kg/m    Wt:   Wt Readings from Last 2 Encounters:   02/19/19 88.8 kg (195 lb 12.8 oz)   12/21/18 (P) 88.2 kg (194 lb 8 oz)      Eyes: Sclera anicteric/injected  Ears/nose/mouth/throat: Normal oropharynx without ulcers or exudate, mucus membranes moist, hearing intact  Neck: supple, thyroid normal size  CV: No edema  Respiratory: Unlabored breathing  Lymph: No submandibular, supraclavicular or inguinal lymphadenopathy  Abd: Nondistended, no masses, +bs, no hepatosplenomegaly, nontender, no peritoneal signs  Skin: warm, perfused, no jaundice  Psych: Normal affect  MSK: Normal gait      PERTINENT STUDIES:   Most recent CBC:  Recent Labs   Lab Test 03/23/18  2105 09/18/17  1458 09/14/17  1626   WBC 8.1  --  6.2   HGB 13.3 11.6* 10.5*   HCT 41.0  --  34.3*     --  263     Most recent hepatic panel:  Recent Labs   Lab Test 11/27/18  0935 10/10/18  0752   ALT 26 25   AST 21 18     Most recent creatinine:  Recent Labs   Lab Test 10/10/18  0752 03/23/18  2105   CR 1.11* 0.82          ASSESSMENT/PLAN:    Mya Hui is a 78 year old female who presents for follow up of chronic loose stools.  Colonoscopy with biopsies was unremarkable.  It is possible that she has choleretic diarrhea given history of recent cholecystectomy.  I have prescribed her cholestyramine 4 g twice daily.  She should continue to use Metamucil wafers 2 packets/day.  Differential also includes medication induced diarrhea.  I have also recommended that she discuss discontinuation of metformin with her primary care physician.  I have also recommended that she consider trying to wean off of pantoprazole in case this is causing the diarrhea but she does not want to make this medication " change.  She should also meet with Adeline Cam of nutrition for lactose-free diet teaching and to assess for other dietary triggers of her loose stools.  She will follow-up with Buddy Ott in 3-4 months.  If she is still having difficulty leaving the house, it may also be worth considering if she would benefit from meeting with Dr. Staley of psychology.      Diarrhea, unspecified type      RTC 4 months    Thank you for this consultation.  It was a pleasure to participate in the care of this patient; please contact us with any further questions.      This note was created with voice recognition software, and while reviewed for accuracy, typos may remain.     fEren Osorio MD  Adjunct  of Medicine  Division of Gastroenterology, Hepatology and Nutrition  [facility]  [phone]

## 2019-03-08 ENCOUNTER — OFFICE VISIT (OUTPATIENT)
Dept: FAMILY MEDICINE | Facility: CLINIC | Age: 79
End: 2019-03-08
Payer: COMMERCIAL

## 2019-03-08 ENCOUNTER — OFFICE VISIT (OUTPATIENT)
Dept: ORTHOPEDICS | Facility: CLINIC | Age: 79
End: 2019-03-08
Payer: COMMERCIAL

## 2019-03-08 VITALS
BODY MASS INDEX: 31.74 KG/M2 | DIASTOLIC BLOOD PRESSURE: 100 MMHG | SYSTOLIC BLOOD PRESSURE: 160 MMHG | WEIGHT: 190.5 LBS | HEART RATE: 85 BPM | HEIGHT: 65 IN

## 2019-03-08 VITALS
HEART RATE: 85 BPM | WEIGHT: 190.5 LBS | RESPIRATION RATE: 20 BRPM | TEMPERATURE: 97.9 F | BODY MASS INDEX: 31.7 KG/M2 | OXYGEN SATURATION: 96 % | SYSTOLIC BLOOD PRESSURE: 160 MMHG | DIASTOLIC BLOOD PRESSURE: 100 MMHG

## 2019-03-08 DIAGNOSIS — E78.5 HYPERLIPIDEMIA WITH TARGET LDL LESS THAN 100: ICD-10-CM

## 2019-03-08 DIAGNOSIS — M12.811 ROTATOR CUFF ARTHROPATHY, RIGHT: Primary | ICD-10-CM

## 2019-03-08 DIAGNOSIS — E03.9 HYPOTHYROIDISM, UNSPECIFIED TYPE: ICD-10-CM

## 2019-03-08 DIAGNOSIS — E11.9 TYPE 2 DIABETES MELLITUS WITHOUT COMPLICATION, WITHOUT LONG-TERM CURRENT USE OF INSULIN (H): ICD-10-CM

## 2019-03-08 DIAGNOSIS — R82.90 ABNORMAL URINE FINDINGS: Primary | ICD-10-CM

## 2019-03-08 DIAGNOSIS — N39.0 URINARY TRACT INFECTION WITH HEMATURIA, SITE UNSPECIFIED: ICD-10-CM

## 2019-03-08 DIAGNOSIS — R30.0 DYSURIA: Primary | ICD-10-CM

## 2019-03-08 DIAGNOSIS — I10 ESSENTIAL HYPERTENSION WITH GOAL BLOOD PRESSURE LESS THAN 140/90: ICD-10-CM

## 2019-03-08 DIAGNOSIS — R31.9 URINARY TRACT INFECTION WITH HEMATURIA, SITE UNSPECIFIED: ICD-10-CM

## 2019-03-08 LAB
ALBUMIN SERPL-MCNC: 3.9 G/DL (ref 3.4–5)
ALBUMIN UR-MCNC: 100 MG/DL
ALP SERPL-CCNC: 53 U/L (ref 40–150)
ALT SERPL W P-5'-P-CCNC: 27 U/L (ref 0–50)
ANION GAP SERPL CALCULATED.3IONS-SCNC: 7 MMOL/L (ref 3–14)
APPEARANCE UR: ABNORMAL
AST SERPL W P-5'-P-CCNC: 21 U/L (ref 0–45)
BILIRUB SERPL-MCNC: 0.5 MG/DL (ref 0.2–1.3)
BILIRUB UR QL STRIP: NEGATIVE
BUN SERPL-MCNC: 11 MG/DL (ref 7–30)
CALCIUM SERPL-MCNC: 9.2 MG/DL (ref 8.5–10.1)
CHLORIDE SERPL-SCNC: 110 MMOL/L (ref 94–109)
CHOLEST SERPL-MCNC: 117 MG/DL
CO2 SERPL-SCNC: 27 MMOL/L (ref 20–32)
COLOR UR AUTO: YELLOW
CREAT SERPL-MCNC: 1.07 MG/DL (ref 0.52–1.04)
GFR SERPL CREATININE-BSD FRML MDRD: 50 ML/MIN/{1.73_M2}
GLUCOSE SERPL-MCNC: 112 MG/DL (ref 70–99)
GLUCOSE UR STRIP-MCNC: NEGATIVE MG/DL
HBA1C MFR BLD: 5.7 % (ref 0–5.6)
HDLC SERPL-MCNC: 46 MG/DL
HGB UR QL STRIP: ABNORMAL
HYALINE CASTS #/AREA URNS LPF: 3 /LPF (ref 0–2)
KETONES UR STRIP-MCNC: NEGATIVE MG/DL
LDLC SERPL CALC-MCNC: 38 MG/DL
LEUKOCYTE ESTERASE UR QL STRIP: ABNORMAL
MUCOUS THREADS #/AREA URNS LPF: PRESENT /LPF
NITRATE UR QL: NEGATIVE
NONHDLC SERPL-MCNC: 71 MG/DL
PH UR STRIP: 5 PH (ref 5–7)
POTASSIUM SERPL-SCNC: 4.2 MMOL/L (ref 3.4–5.3)
PROT SERPL-MCNC: 7.2 G/DL (ref 6.8–8.8)
RBC #/AREA URNS AUTO: 23 /HPF (ref 0–2)
SODIUM SERPL-SCNC: 144 MMOL/L (ref 133–144)
SOURCE: ABNORMAL
SP GR UR STRIP: 1.02 (ref 1–1.03)
SQUAMOUS #/AREA URNS AUTO: 3 /HPF (ref 0–1)
T4 FREE SERPL-MCNC: 0.95 NG/DL (ref 0.76–1.46)
TRIGL SERPL-MCNC: 165 MG/DL
TSH SERPL DL<=0.005 MIU/L-ACNC: 4.77 MU/L (ref 0.4–4)
UROBILINOGEN UR STRIP-MCNC: 0 MG/DL (ref 0–2)
WBC #/AREA URNS AUTO: >182 /HPF (ref 0–5)
WBC CLUMPS #/AREA URNS HPF: PRESENT /HPF

## 2019-03-08 PROCEDURE — 81001 URINALYSIS AUTO W/SCOPE: CPT | Performed by: NURSE PRACTITIONER

## 2019-03-08 PROCEDURE — 87086 URINE CULTURE/COLONY COUNT: CPT | Performed by: NURSE PRACTITIONER

## 2019-03-08 PROCEDURE — 84439 ASSAY OF FREE THYROXINE: CPT | Performed by: NURSE PRACTITIONER

## 2019-03-08 PROCEDURE — 99213 OFFICE O/P EST LOW 20 MIN: CPT | Performed by: NURSE PRACTITIONER

## 2019-03-08 PROCEDURE — 84443 ASSAY THYROID STIM HORMONE: CPT | Performed by: NURSE PRACTITIONER

## 2019-03-08 PROCEDURE — 87186 SC STD MICRODIL/AGAR DIL: CPT | Performed by: NURSE PRACTITIONER

## 2019-03-08 PROCEDURE — 80053 COMPREHEN METABOLIC PANEL: CPT | Performed by: NURSE PRACTITIONER

## 2019-03-08 PROCEDURE — 36415 COLL VENOUS BLD VENIPUNCTURE: CPT | Performed by: NURSE PRACTITIONER

## 2019-03-08 PROCEDURE — 80061 LIPID PANEL: CPT | Performed by: NURSE PRACTITIONER

## 2019-03-08 PROCEDURE — 83036 HEMOGLOBIN GLYCOSYLATED A1C: CPT | Performed by: NURSE PRACTITIONER

## 2019-03-08 PROCEDURE — 87088 URINE BACTERIA CULTURE: CPT | Performed by: NURSE PRACTITIONER

## 2019-03-08 PROCEDURE — 20610 DRAIN/INJ JOINT/BURSA W/O US: CPT | Mod: RT | Performed by: PHYSICIAN ASSISTANT

## 2019-03-08 RX ORDER — SULFAMETHOXAZOLE/TRIMETHOPRIM 800-160 MG
1 TABLET ORAL 2 TIMES DAILY
Qty: 10 TABLET | Refills: 0 | Status: SHIPPED | OUTPATIENT
Start: 2019-03-08 | End: 2019-03-14

## 2019-03-08 RX ORDER — TRIAMCINOLONE ACETONIDE 40 MG/ML
40 INJECTION, SUSPENSION INTRA-ARTICULAR; INTRAMUSCULAR ONCE
Status: COMPLETED | OUTPATIENT
Start: 2019-03-08 | End: 2019-03-08

## 2019-03-08 RX ORDER — METFORMIN HCL 500 MG
500 TABLET, EXTENDED RELEASE 24 HR ORAL
Qty: 90 TABLET | Refills: 1
Start: 2019-01-01 | End: 2019-03-14

## 2019-03-08 RX ADMIN — TRIAMCINOLONE ACETONIDE 40 MG: 40 INJECTION, SUSPENSION INTRA-ARTICULAR; INTRAMUSCULAR at 11:23

## 2019-03-08 ASSESSMENT — MIFFLIN-ST. JEOR: SCORE: 1349.98

## 2019-03-08 NOTE — PROGRESS NOTES
SUBJECTIVE:   Mya Hui is a 79 year old female who presents to clinic today for the following health issues:      URINARY TRACT SYMPTOMS  Onset: overnight    Description:   Painful urination (Dysuria): no   Blood in urine (Hematuria): no   Delay in urine (Hesitency): YES    Intensity: mild, moderate    Progression of Symptoms:  same    Accompanying Signs & Symptoms:  Fever/chills: no   Flank pain no   Nausea and vomiting: no   Any vaginal symptoms: vaginal odor  Abdominal/Pelvic Pain: no     History:   History of frequent UTI's: no   History of kidney stones: no   Sexually Active: no   Possibility of pregnancy: No    Precipitating factors:   none    Therapies Tried and outcome: Increase fluid intake    Her blood pressure also has been elevated.  She currently is taking lisinopril 20 mg daily.  On reviewing her medical records, last year she had been on diltiazem extended release 120 mg daily.  For some reason, after being discharged from the hospital following her Angélica cystectomy, the diltiazem was not restarted.  She had been on lisinopril with HCTZ, on discharge HCTZ was discontinued since she has a tendency towards hypokalemia.  Even without the diuretic, she is needing potassium supplementation    She continues to deal with gastrointestinal issues.  Is seeing a gastroenterologist.  She states she has not had diarrhea since her colonoscopy.  Her gastroenterologist would like to get her off metformin and onto another medication for diabetes management.  Since the end of December, her Glucophage has been decreased to 500 mg once daily.  Will check her hemoglobin A1c and a fasting blood sugar, if she is within goal, perhaps she can manage her diabetes with diet alone.    She has an appointment next week for diabetes follow-up.  She is fasting today, so we will get the blood drawn at this time and review results next week    Problem list and histories reviewed & adjusted, as indicated.  Additional history:  as documented    BP Readings from Last 3 Encounters:   03/08/19 (!) 160/100   03/08/19 (!) 160/100   02/19/19 154/87    Wt Readings from Last 3 Encounters:   03/08/19 86.4 kg (190 lb 8 oz)   03/08/19 86.4 kg (190 lb 8 oz)   02/19/19 88.8 kg (195 lb 12.8 oz)                    Reviewed and updated as needed this visit by clinical staff  Tobacco  Allergies  Meds  Med Hx  Surg Hx  Fam Hx  Soc Hx      Reviewed and updated as needed this visit by Provider         ROS:  Constitutional, HEENT, cardiovascular, pulmonary, gi and gu systems are negative, except as otherwise noted.    OBJECTIVE:     BP (!) 160/100   Pulse 85   Temp 97.9  F (36.6  C) (Temporal)   Resp 20   Wt 86.4 kg (190 lb 8 oz)   SpO2 96%   BMI 31.70 kg/m    Body mass index is 31.7 kg/m .   GENERAL: healthy, alert and no distress  ABDOMEN: soft, nontender, no hepatosplenomegaly, no masses and bowel sounds normal    Diagnostic Test Results:  Results for orders placed or performed in visit on 03/08/19 (from the past 24 hour(s))   UA reflex to Microscopic and Culture   Result Value Ref Range    Color Urine Yellow     Appearance Urine Cloudy     Glucose Urine Negative NEG^Negative mg/dL    Bilirubin Urine Negative NEG^Negative    Ketones Urine Negative NEG^Negative mg/dL    Specific Gravity Urine 1.020 1.003 - 1.035    Blood Urine Moderate (A) NEG^Negative    pH Urine 5.0 5.0 - 7.0 pH    Protein Albumin Urine 100 (A) NEG^Negative mg/dL    Urobilinogen mg/dL 0.0 0.0 - 2.0 mg/dL    Nitrite Urine Negative NEG^Negative    Leukocyte Esterase Urine Moderate (A) NEG^Negative    Source Unspecified Urine     RBC Urine 23 (H) 0 - 2 /HPF    WBC Urine >182 (H) 0 - 5 /HPF    WBC Clumps Present (A) NEG^Negative /HPF    Squamous Epithelial /HPF Urine 3 (H) 0 - 1 /HPF    Mucous Urine Present (A) NEG^Negative /LPF    Hyaline Casts 3 (H) 0 - 2 /LPF   Lipid panel reflex to direct LDL Fasting   Result Value Ref Range    Cholesterol 117 <200 mg/dL    Triglycerides 165  (H) <150 mg/dL    HDL Cholesterol 46 (L) >49 mg/dL    LDL Cholesterol Calculated 38 <100 mg/dL    Non HDL Cholesterol 71 <130 mg/dL   Comprehensive metabolic panel   Result Value Ref Range    Sodium 144 133 - 144 mmol/L    Potassium 4.2 3.4 - 5.3 mmol/L    Chloride 110 (H) 94 - 109 mmol/L    Carbon Dioxide 27 20 - 32 mmol/L    Anion Gap 7 3 - 14 mmol/L    Glucose 112 (H) 70 - 99 mg/dL    Urea Nitrogen 11 7 - 30 mg/dL    Creatinine 1.07 (H) 0.52 - 1.04 mg/dL    GFR Estimate 50 (L) >60 mL/min/[1.73_m2]    GFR Estimate If Black 58 (L) >60 mL/min/[1.73_m2]    Calcium 9.2 8.5 - 10.1 mg/dL    Bilirubin Total 0.5 0.2 - 1.3 mg/dL    Albumin 3.9 3.4 - 5.0 g/dL    Protein Total 7.2 6.8 - 8.8 g/dL    Alkaline Phosphatase 53 40 - 150 U/L    ALT 27 0 - 50 U/L    AST 21 0 - 45 U/L   Hemoglobin A1c   Result Value Ref Range    Hemoglobin A1C 5.7 (H) 0 - 5.6 %   TSH with free T4 reflex   Result Value Ref Range    TSH 4.77 (H) 0.40 - 4.00 mU/L       ASSESSMENT/PLAN:     Problem List Items Addressed This Visit        Medium    Essential hypertension with goal blood pressure less than 140/90    Relevant Medications    diltiazem ER COATED BEADS (CARDIAZEM LA) 120 MG 24 hr tablet    Other Relevant Orders    Comprehensive metabolic panel (Completed)    Hyperlipidemia with target LDL less than 100    Relevant Orders    Lipid panel reflex to direct LDL Fasting (Completed)    Hypothyroidism, unspecified type    Relevant Orders    TSH with free T4 reflex (Completed)    Type 2 diabetes mellitus without complication, without long-term current use of insulin (H)    Relevant Medications    metFORMIN (GLUCOPHAGE-XR) 500 MG 24 hr tablet    Other Relevant Orders    Hemoglobin A1c (Completed)    Albumin Random Urine Quantitative with Creat Ratio      Other Visit Diagnoses     Dysuria    -  Primary    Relevant Orders    UA reflex to Microscopic and Culture (Completed)    T4 free (Completed)    Urinary tract infection with hematuria, site unspecified         Relevant Medications    sulfamethoxazole-trimethoprim (BACTRIM DS/SEPTRA DS) 800-160 MG tablet               Urinalysis is positive for urinary tract infection.  Start Bactrim DS 1 tablet twice daily for 5 days.  Increase intake of oral fluids.  She has an appointment  Next week to follow-up on her other lab results    MAYI [facility]

## 2019-03-08 NOTE — LETTER
"    3/8/2019         RE: Mya Hui  [address]  [place] [address]        Dear Colleague,    Thank you for referring your patient, Mya Hui, to the [facility]. Please see a copy of my visit note below.    Office Visit-Follow up    Chief Complaint: Mya Hui is a 79 year old female who is being seen for   Chief Complaint   Patient presents with     RECHECK     rt shoulder would like inj last inj given on 10/17/18       History of Present Illness:   Patient is here in follow-up cortisone injection.  She has known right shoulder arthropathy.  She has been having injections to help with her pain.  She had 3 injections over the course of 2018 with the last one on 10/17/2018.  That one lasted greater than 3 months.  We will continue and do another cortisone injection.  She does not have good kidney function so we will avoid doing any Mobic or powerful NSAIDs.  We talked about all this today.  We also talked about if she ever had surgery would be a reverse total shoulder but she does not want surgery.  I would agree with that.    Physical Exam:  Vitals: BP (!) 160/100 (BP Location: Left arm, Patient Position: Chair, Cuff Size: Adult Large)   Pulse 85   Ht 1.651 m (5' 5\")   Wt 86.4 kg (190 lb 8 oz)   BMI 31.70 kg/m     BMI= Body mass index is 31.7 kg/m .  Constitutional: healthy, alert and no acute distress   Psychiatric: mentation appears normal and affect normal/bright  NEURO: no focal deficits, CMS intact right upper extremity  RESP: Normal with easy respirations and no use of accessory muscles to breathe, no audible wheezing or retractions  CV: +2 radial pulse and her hand is warm to palpation.   SKIN: No erythema, rashes, excoriation, or breakdown. No evidence of infection.   MUSCULOSKELETAL:    INSPECTION of right shoulder: No gross deformities, erythema, edema, ecchymosis, atrophy or fasciculations.     PALPATION: No tenderness to palpation of the AC joint, " proximal biceps tendon, clavicle, lateral shoulder, posterior shoulder, trapezius area. No increased warmth noted.     ROM: Able to do gentle range of motion of shoulder without any catching or locking or pain.    STRENGTH: 5 out of 5 .    SPECIAL TEST: None today.   GAIT: non-antalgic  Lymph: no palpable lymph nodes    Impression: 1.  Right shoulder arthropathy    Plan:                               INJECTION PROCEDURE:  The patient was counseled about an  injection, including discussion of risks (including infection), contents of the injection, rationale for performing the injection, and expected benefits of the injection. The skin was prepped with alcohol and betadine and then utilizing sterile technique an injection of the right shoulder subacromial space from the posterolateral approach  was performed. I used everardo chloride spray prior to doing the injection. The injection consisted 1ml of Kenalog (40mg per 1ml) with 8ml 1% lidocaine plain. The patient tolerated the injection well, and there were no complications. The injection site was covered with a Band-Aid. The injection was performed by Tony Bolivar PA-C    Patient Instructions:   1.  You have been doing well with your injections of cortisone.  You had 3 over the year 2018.  Your last cortisone injection was 10/17/2018 and that lasted for maybe greater than 3 months which is excellent.  2.  Medication: Your kidney function is not the best and so regular NSAIDs like Mobic we cannot use.  Take Tylenol like you have been 2 tablets 3 times a day.  3.  Surgery: In your case you would have a reverse total shoulder and that is a big surgery.  We talked briefly about this today and you would like to hold off on surgery since cortisone injections are working.  I would agree with that.  4. You can followup with Annamaria Carpio MD in 6 weeks, if you are having pain at that time we can do a cortisone injection.  You can always cancel the appointment if you are  doing really well and follow-up as needed.   Re-x-ray on return: No    BP Readings from Last 1 Encounters:   03/08/19 (!) 160/100       Mya to follow up with Primary Care provider regarding elevated blood pressure.     Patient does not use Tobacco products.      This note was dictated with [employer].    Reuben Bolivar PA-C      Again, thank you for allowing me to participate in the care of your patient.        Sincerely,        Reuben Bolivar PA-C

## 2019-03-08 NOTE — PATIENT INSTRUCTIONS
Mya to follow up with Primary Care provider regarding elevated blood pressure.    Encounter Diagnosis   Name Primary?     Rotator cuff arthropathy, right Yes     Rest, ice and elevate above heart level as needed for pain control  1.  You have been doing well with your injections of cortisone.  You had 3 over the year 2018.  Your last cortisone injection was 10/17/2018 and that lasted for maybe greater than 3 months which is excellent.  2.  Medication: Your kidney function is not the best and so regular NSAIDs like Mobic we cannot use.  Take Tylenol like you have been 2 tablets 3 times a day.  3.  Surgery: In your case you would have a reverse total shoulder and that is a big surgery.  We talked briefly about this today and you would like to hold off on surgery since cortisone injections are working.  I would agree with that.  4. You can followup with Annamaria Carpio MD in 6 weeks, if you are having pain at that time we can do a cortisone injection.  You can always cancel the appointment if you are doing really well and follow-up as needed.   Cortisone Instructions:     1. You received an injection of cortisone into your right shoulder today.  2. The joint(s) may be more painful for the first 1-2 days.  3. We ask you to continue to rest the joint(s) for a few more days before resuming regular activities.  4. Pain Medications you can take (as long as your primary care provider allows these meds and you do not have kidney or liver conditions):  Tylenol  Take 1000 mg by mouth every 6 hours as needed; maximum dose 4000 mg a day  Ibuprofen  600 mg every 6 hours as needed; maximum 2400 mg a day  (OK to take tylenol and ibuprofen at the same time)  5. Rest, ice and elevate as needed for pain control  6. Watch for these signs of infection: redness, swelling, drainage, warmth to touch, increased pain, or fever. Call the clinic or make an appointment to be seen if you think you have an infection.  7. If you are  diabetic, make sure you keep a close eye on your blood sugars, they can get elevated with cortisone injections.   8. Sometimes it can take 1-2 weeks for it to reach its full effect.    Cortisone Injections  Cortisone is a type of steroid. It can greatly reduce swelling, redness, and irritation (inflammation) and pain. Being injected with cortisone is simple and doesn t take long. Your doctor may ask you questions about your health. Certain health conditions, such as diabetes, can be affected by cortisone.     Your pain may be relieved by a cortisone injection.   Why have a cortisone injection?  Injecting cortisone can relieve pain for anything from a sports injury to arthritis. Your doctor may suggest an injection if rest, splints, or oral medicine doesn t relieve your pain. Injecting cortisone is simpler than having surgery. And cortisone may provide the lasting pain relief that can help you get out and enjoy life again.  Getting the injection  Your doctor will start by cleaning and occasionally numbing your skin at the injection site. Next you ll be injected with local anesthetics (for short-term pain relief) and cortisone. The injection may last a few moments. A small bandage will be put over the injection site. You ll then be ready to go home.  After your injection  After being injected, make sure you don t injure the treated region. But stay active. Enjoy a walk or some other mild activity. Just be careful not to strain the region that gave you trouble.  The next day  Some people feel more pain after being injected. This is normal, and it will go away soon. Applying ice for 20 minutes at a time to your injury may reduce the increased pain. Rest for the first day or two. You don t need to stay in bed. But avoid tasks that may strain the injured region.  If you have diabetes  Cortisone injections can cause blood sugar to be increased for several days after the injection. If you have diabetes, you should follow  your blood  sugar closely during this time. Follow your regular plan for what to do when your blood sugar is elevated.     9940-2884 The [employer] [address] [place] [address], [place] [address]. All rights reserved. This information is not intended as a substitute for professional medical care. Always follow your healthcare professional's instructions.     [employer] and [employer] by [employer] may offer reliable information regarding your diagnosis and treatment plan.    THANK YOU for coming in today. If you receive a survey via [email or web address] or mail please let us know if there was anything you especially appreciated today or if there is any way we can improve our clinic. We appreciate your input.    GENERAL INFORMATION:  Our hours are:  Monday :     Clinic 7:30 AM-430 PM ([facility])  Tuesday:      Operating Room All Day ([facility])  Wednesday: Clinic 7:30 AM - 11:15 AM ([facility])             Clinic 1:00 PM - 4:00PM ([facility])  Thursday:     Administrative Day  Friday:          Clinic 7:30 AM - 11:15 AM ([facility])            Clinic 1:00 PM - 4:00 PM ([facility])      [facility] for any issues or concerns: [phone]      We are not in the office Thursdays. Therefore non- urgent calls and medical messages received on Thursday will be addressed when we are back in the office on Wednesday. Urgent matters will be reviewed and addressed by one of our partners in the office as needed.    If lab work was done today as part of your evaluation you will generally be contacted via [email or web address], mail, or phone with the results within 1-5 days. If there is an alarming result we will contact you by phone. Lab results come back at varying times, I generally wait until all labs are resulted before making comments on results. Please note labs are automatically released to [email or web address] (if you have signed up for it) once  available-at times you may see these prior to my having a chance to review them as well.    If you need refills please contact your pharmacist. They will send a refill request to me to review. Please allow 3 business days for us to process all refill requests. All narcotic refills should be handled in the clinic at the time of your visit.

## 2019-03-08 NOTE — NURSING NOTE
Prior to injection, verified patient identity using patient's name and date of birth.  Due to injection administration, patient instructed to remain in clinic for 15 minutes  afterwards, and to report any adverse reaction to me immediately.    Joint injection was performed.      Drug Amount Wasted:  None.  Vial/Syringe: Single dose vial  Expiration Date:  9/2020

## 2019-03-08 NOTE — PROGRESS NOTES
"Office Visit-Follow up    Chief Complaint: Mya Hui is a 79 year old female who is being seen for   Chief Complaint   Patient presents with     RECHECK     rt shoulder would like inj last inj given on 10/17/18       History of Present Illness:   Patient is here in follow-up cortisone injection.  She has known right shoulder arthropathy.  She has been having injections to help with her pain.  She had 3 injections over the course of 2018 with the last one on 10/17/2018.  That one lasted greater than 3 months.  We will continue and do another cortisone injection.  She does not have good kidney function so we will avoid doing any Mobic or powerful NSAIDs.  We talked about all this today.  We also talked about if she ever had surgery would be a reverse total shoulder but she does not want surgery.  I would agree with that.    Physical Exam:  Vitals: BP (!) 160/100 (BP Location: Left arm, Patient Position: Chair, Cuff Size: Adult Large)   Pulse 85   Ht 1.651 m (5' 5\")   Wt 86.4 kg (190 lb 8 oz)   BMI 31.70 kg/m    BMI= Body mass index is 31.7 kg/m .  Constitutional: healthy, alert and no acute distress   Psychiatric: mentation appears normal and affect normal/bright  NEURO: no focal deficits, CMS intact right upper extremity  RESP: Normal with easy respirations and no use of accessory muscles to breathe, no audible wheezing or retractions  CV: +2 radial pulse and her hand is warm to palpation.   SKIN: No erythema, rashes, excoriation, or breakdown. No evidence of infection.   MUSCULOSKELETAL:    INSPECTION of right shoulder: No gross deformities, erythema, edema, ecchymosis, atrophy or fasciculations.     PALPATION: No tenderness to palpation of the AC joint, proximal biceps tendon, clavicle, lateral shoulder, posterior shoulder, trapezius area. No increased warmth noted.     ROM: Able to do gentle range of motion of shoulder without any catching or locking or pain.    STRENGTH: 5 out of 5 .    SPECIAL " TEST: None today.   GAIT: non-antalgic  Lymph: no palpable lymph nodes    Impression: 1.  Right shoulder arthropathy    Plan:                               INJECTION PROCEDURE:  The patient was counseled about an  injection, including discussion of risks (including infection), contents of the injection, rationale for performing the injection, and expected benefits of the injection. The skin was prepped with alcohol and betadine and then utilizing sterile technique an injection of the right shoulder subacromial space from the posterolateral approach  was performed. I used everardo chloride spray prior to doing the injection. The injection consisted 1ml of Kenalog (40mg per 1ml) with 8ml 1% lidocaine plain. The patient tolerated the injection well, and there were no complications. The injection site was covered with a Band-Aid. The injection was performed by Tony Bolivar PA-C    Patient Instructions:   1.  You have been doing well with your injections of cortisone.  You had 3 over the year 2018.  Your last cortisone injection was 10/17/2018 and that lasted for maybe greater than 3 months which is excellent.  2.  Medication: Your kidney function is not the best and so regular NSAIDs like Mobic we cannot use.  Take Tylenol like you have been 2 tablets 3 times a day.  3.  Surgery: In your case you would have a reverse total shoulder and that is a big surgery.  We talked briefly about this today and you would like to hold off on surgery since cortisone injections are working.  I would agree with that.  4. You can followup with Annamaria Carpio MD in 6 weeks, if you are having pain at that time we can do a cortisone injection.  You can always cancel the appointment if you are doing really well and follow-up as needed.   Re-x-ray on return: No    BP Readings from Last 1 Encounters:   03/08/19 (!) 160/100       Mya to follow up with Primary Care provider regarding elevated blood pressure.     Patient does not use Tobacco  products.      This note was dictated with [employer].    Reuben Bolivar PA-C

## 2019-03-09 LAB
BACTERIA SPEC CULT: ABNORMAL
BACTERIA SPEC CULT: ABNORMAL
SPECIMEN SOURCE: ABNORMAL

## 2019-03-14 ENCOUNTER — OFFICE VISIT (OUTPATIENT)
Dept: FAMILY MEDICINE | Facility: CLINIC | Age: 79
End: 2019-03-14
Payer: COMMERCIAL

## 2019-03-14 VITALS
HEART RATE: 66 BPM | BODY MASS INDEX: 31.28 KG/M2 | WEIGHT: 188 LBS | SYSTOLIC BLOOD PRESSURE: 166 MMHG | DIASTOLIC BLOOD PRESSURE: 94 MMHG | TEMPERATURE: 97 F | OXYGEN SATURATION: 97 %

## 2019-03-14 DIAGNOSIS — I10 ESSENTIAL HYPERTENSION WITH GOAL BLOOD PRESSURE LESS THAN 140/90: Primary | ICD-10-CM

## 2019-03-14 DIAGNOSIS — R35.1 NOCTURIA: ICD-10-CM

## 2019-03-14 PROCEDURE — 99214 OFFICE O/P EST MOD 30 MIN: CPT | Performed by: NURSE PRACTITIONER

## 2019-03-14 RX ORDER — LISINOPRIL 40 MG/1
40 TABLET ORAL DAILY
Qty: 90 TABLET | Refills: 1 | Status: SHIPPED | OUTPATIENT
Start: 2019-03-14 | End: 2019-09-25

## 2019-03-14 NOTE — PROGRESS NOTES
SUBJECTIVE:   Mya Hui is a 79 year old female who presents to clinic today for the following health issues:      Medication Followup of medications, wanting to discuss multiple meds    Taking Medication as prescribed: yes, Protonix taking 1 daily, still having heartburn, Ditropan tried taking 1 in AM and 2 PM, not helping with symptoms    Side Effects:      Medication Helping Symptoms:         Mya is accompanied to clinic today by her daughter to review her medications.  Her metformin had been progressively reduced from 3 times daily down to 1 time daily.  Her hemoglobin A1c was good at 5.7.  We discussed trying just dietary management, hoping that being off the metformin would help with her chronic diarrhea.  She informs me today that since starting on the Questran she is no longer having diarrhea but now she is having some constipation issues.  She also feels that her oxybutynin is not helping at all for her urinary frequency.  This is not an issue during the day.  However, when she gets up at night she states she gets up every hour.  She is taking oxybutynin 10 mg daily.  She also continues to have issues with esophageal reflux.  Her gastroenterologist wanted her to try cutting back to taking her Protonix once daily.  She is experiencing a burning pain in the lower esophageal area.  It was quite uncomfortable this morning when she first got up.  She frequently has heartburn even before going to bed at night.  She states she waits at least 2-3 hours between supper and laying down.    Problem list and histories reviewed & adjusted, as indicated.  Additional history: as documented    BP Readings from Last 3 Encounters:   03/14/19 (!) 166/94   03/08/19 (!) 160/100   03/08/19 (!) 160/100    Wt Readings from Last 3 Encounters:   03/14/19 85.3 kg (188 lb)   03/08/19 86.4 kg (190 lb 8 oz)   03/08/19 86.4 kg (190 lb 8 oz)                    Reviewed and updated as needed this visit by clinical staff  Tobacco   Meds  Med Hx  Surg Hx  Fam Hx  Soc Hx      Reviewed and updated as needed this visit by Provider         ROS:  Constitutional, HEENT, cardiovascular, pulmonary, gi and gu systems are negative, except as otherwise noted.    OBJECTIVE:     BP (!) 166/94   Pulse 66   Temp 97  F (36.1  C) (Temporal)   Wt 85.3 kg (188 lb)   SpO2 97%   BMI 31.28 kg/m    Body mass index is 31.28 kg/m .   GENERAL: healthy, alert and no distress  She was recently seen in clinic, exam is not repeated today     ASSESSMENT/PLAN:     Problem List Items Addressed This Visit        Medium    Essential hypertension with goal blood pressure less than 140/90 - Primary    Relevant Medications    lisinopril (PRINIVIL/ZESTRIL) 40 MG tablet      Other Visit Diagnoses     Nocturia        Relevant Orders    UROLOGY ADULT REFERRAL         All medications were reviewed at length with the patient and her daughter.  We discussed to their purpose and potential side effects.    For blood pressure management we will continued diltiazem extended release 120 mg daily and increase lisinopril to 40 mg daily.  She will check her blood pressure on a regular basis and will send a message to me in clinic as to what they are running so her dose can be adjusted as necessary.    For the bowels, we discussed the importance of eating regularly and well-balanced diet.  She has lost 10 pounds since last October.  Encouraged to eat small portions of protein, more vegetables and fruits, limit simple carbohydrates.  Ensure adequate intake of water.  She will continue the cholestyramine powder 4 g twice daily and will cut back on the Metamucil wafers that she has been taking.  She has an appointment scheduled with the gastroenterologist nurse practitioner and dietitian on May 28    She initially thought there was no response to the oxybutynin, but her daughter states that she did try discontinuing it at one time and symptoms are worse.  She has been taking this for a  number of years.  Will refer to urology.  She is aware she will be contacted by their office to schedule an appointment    She will resume the Protonix twice daily.  It seems she has been taking her potassium chloride supplement 3 times daily, the third time at being at bedtime on an empty stomach.  I have suggested she take the potassium 3 times daily with meals to help minimize GI side effects    Continue Pravachol 40 mg and levothyroxine 75 mcg daily    This was a 30-minute appointment of which 100% of time was devoted to education and establishing a plan of care      MAYI Anthony [facility]

## 2019-03-15 ENCOUNTER — TELEPHONE (OUTPATIENT)
Dept: FAMILY MEDICINE | Facility: CLINIC | Age: 79
End: 2019-03-15

## 2019-03-15 NOTE — TELEPHONE ENCOUNTER
Reason for Call:  Other prescription    Detailed comments: Mya is wondering if she is supposed to be taking Iron?    Phone Number Patient can be reached at: Home number on file [phone] (home)    Best Time: any    Can we leave a detailed message on this number? YES    Call taken on 3/15/2019 at 1:51 PM by Rosa Collazo

## 2019-03-15 NOTE — TELEPHONE ENCOUNTER
Called patient, per provider, no need to take iron. Labs are all good. Patient states understanding. ACase/MA

## 2019-03-19 ENCOUNTER — HOSPITAL ENCOUNTER (EMERGENCY)
Facility: CLINIC | Age: 79
Discharge: HOME | End: 2019-03-19
Attending: PHYSICIAN ASSISTANT | Admitting: PHYSICIAN ASSISTANT
Payer: COMMERCIAL

## 2019-03-19 VITALS
WEIGHT: 188 LBS | RESPIRATION RATE: 17 BRPM | OXYGEN SATURATION: 94 % | BODY MASS INDEX: 31.32 KG/M2 | DIASTOLIC BLOOD PRESSURE: 98 MMHG | HEART RATE: 72 BPM | HEIGHT: 65 IN | TEMPERATURE: 98.1 F | SYSTOLIC BLOOD PRESSURE: 187 MMHG

## 2019-03-19 DIAGNOSIS — M51.369 DDD (DEGENERATIVE DISC DISEASE), LUMBAR: ICD-10-CM

## 2019-03-19 DIAGNOSIS — M62.830 BACK MUSCLE SPASM: ICD-10-CM

## 2019-03-19 PROCEDURE — 99284 EMERGENCY DEPT VISIT MOD MDM: CPT | Mod: Z6 | Performed by: PHYSICIAN ASSISTANT

## 2019-03-19 PROCEDURE — 25000132 ZZH RX MED GY IP 250 OP 250 PS 637: Performed by: PHYSICIAN ASSISTANT

## 2019-03-19 PROCEDURE — 99283 EMERGENCY DEPT VISIT LOW MDM: CPT | Performed by: PHYSICIAN ASSISTANT

## 2019-03-19 RX ORDER — LIDOCAINE 4 G/G
1 PATCH TOPICAL ONCE
Status: DISCONTINUED | OUTPATIENT
Start: 2019-03-19 | End: 2019-03-19 | Stop reason: HOSPADM

## 2019-03-19 RX ORDER — METHYLPREDNISOLONE 4 MG
TABLET, DOSE PACK ORAL
Qty: 21 TABLET | Refills: 0 | Status: SHIPPED | OUTPATIENT
Start: 2019-03-19 | End: 2019-07-17

## 2019-03-19 RX ORDER — CYCLOBENZAPRINE HCL 10 MG
5 TABLET ORAL 3 TIMES DAILY PRN
Qty: 15 TABLET | Refills: 0 | Status: SHIPPED | OUTPATIENT
Start: 2019-03-19 | End: 2019-09-24

## 2019-03-19 RX ADMIN — LIDOCAINE 1 PATCH: 560 PATCH PERCUTANEOUS; TOPICAL; TRANSDERMAL at 19:33

## 2019-03-19 ASSESSMENT — MIFFLIN-ST. JEOR: SCORE: 1338.64

## 2019-03-19 NOTE — ED PROVIDER NOTES
History     Chief Complaint   Patient presents with     Hip Pain     The history is provided by the patient.     Mya Hui is a 79 year old female who presents to the emergency department for hip pain. Patient reports having left hip/sciatic pain that started 3-4 days ago, no injury known. She states the pain sometimes radiates around to the front of her abdomen. She states there is a lump in the left hip area where she has the pain and has a hard time moving her left leg. She states the pain is at a 10/10, but it comes in waves. She denies any numbness, tingling, abdominal pain, recent fall or injury. She has tried hot/cold packs that do help for a short time and Tylenol for her pain. She does have a history of back problems and had a cortisone injection in 12/18 with an L4/L5 steroid injection.  Patient reports that this significantly helped her pain.  She does not have any radicular pain into the right lower extremity anymore.  The pain she is experiencing at this time is much improved.  Denies any alteration with her bowels.  No blood in the stool.  Denies any dysuria frequency urgency or flank pain.      LUMBAR SPINE TWO-THREE  VIEWS  12/6/2018 10:14 AM      HISTORY:  Chronic right-sided low back pain with right-sided sciatica.     COMPARISON: June 25, 2015, October 17, 2013     FINDINGS: Marked lumbar scoliosis with convexity towards the right.  Moderate-to-severe multilevel degenerative change.   Lordosis is  unremarkable. There is no acute fracture or dislocation.  There are no  worrisome bony lesions.                                                                      IMPRESSION:  Progressive scoliosis and degenerative change since 2013.     JORGE GARCIA MD      MRI OF THE LUMBAR SPINE WITHOUT CONTRAST  1/14/2014 9:24 AM      COMPARISON: None.     HISTORY: Degeneration of lumbar or lumbosacral intervertebral disc.     TECHNIQUE: Multiplanar, multisequence MRI images of the lumbar spine  were  acquired without IV contrast.     FINDINGS: There are five lumbar-type vertebrae for the purposes of  this dictation.      There is normal alignment of the lumbar vertebrae. Vertebral body  heights of the lumbar spine are normal. There is reactive,  degenerative marrow signal change in the opposing endplates at the  L3-L4 level. Marrow signal throughout the lumbar vertebrae is  otherwise within normal limits. There is facet arthropathy throughout  the lumbar spine.     There is loss of disc height, disc desiccation and posterior disc  bulging/herniation to varying degrees at all levels of the lumbar  spine with exception of the relatively normal-appearing L5-S1 disc.     The tip of the conus medullaris is at the mid L1 level which is within  normal limits. There is no evidence for intrathecal abnormality.     Level by level:     L1-L2: There is a minimal circumferential disc bulge and mild facet  arthropathy bilaterally. There is no spinal canal or neural foraminal  stenosis at this level.     L2-L3: There is a circumferential disc bulge with a superimposed right  paracentral/right lateral (foraminal zone) disc herniation  (extrusion). Extruded disc material extends slightly cephalad from the  parent disc in the right anterior epidural space. These findings  result in mild spinal canal narrowing with moderate-severe narrowing  of the right lateral recess of the spinal canal. There is mild  bilateral neural foraminal narrowing.     L3-L4: There is a circumferential disc bulge and severe facet  arthropathy bilaterally. These findings result in mild right foraminal  narrowing but no left foraminal stenosis. There is no spinal canal  stenosis.     L4-L5: There is a circumferential disc bulge and severe facet  arthropathy bilaterally. These findings result in mild right foraminal  narrowing but no spinal canal or left foraminal stenosis.     L5-S1: There is severe facet arthropathy bilaterally that results in  minimal  narrowing of the right neural foramen. There is no spinal  canal or left foraminal stenosis.     IMPRESSION  IMPRESSION: Degenerative changes of the lumbar spine as described  above.     SALVADOR ABDULLAHI MD        Excerpt from her  sports medicine visit on 12/14/18:  Mya Hui is a 78 year old female who is seen in follow up.  Since last visit on 12/6/18 patient has continued to have right sided back pain with radiation down the right leg.  She is here today to review her MRI results. She has not scheduled with physical therapy yet. She is doing some home exercises at home. She rates the pain at a  2/10 currently.  Symptoms are relieved with nothing.  Symptoms are worsened by prolonged walking.     Assessment:  1. Chronic right-sided low back pain with right-sided sciatica    2. Lumbar degenerative disc disease          Plan:  Discussed the assessment with the patient and developed a plan together:  -Epidural Steroid injection done at [facility] 12/27/18 with check in at 10:30am, appointment at 11:30am. Advised that she schedule a pre-op with her primary care provider prior to the injection.  A nurse will call her a few days prior to the procedure to go over instructions.   -External referral to pain management. Referral printed.  -Continue home exercises     -We also discussed other future treatment options:  Spine surgery referral     -Patient to follow up with Primary Care provider regarding elevated blood pressure.     Follow up as needed if symptoms fail to improve or worsen. Please call with any questions or concerns.      Time spent in one-on-one evaluation and discussion with patient regarding nature of problem, course, prior treatments, and therapeutic options, at least 50% of which was spent in counseling and coordination of care:  25 minutes.        Princess Cohen MD, CAQ Sports Medicine  [facility]          Allergies:  Allergies   Allergen Reactions      Codeine Nausea     Fentanyl Nausea and Vomiting     VERY sensitive to most narcotics if not all.       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     Priority: Medium     Cherry angioma 04/03/2018     Priority: Medium     Gastroesophageal reflux disease, esophagitis presence not specified 09/14/2017     Priority: Medium     Sliding hiatal hernia 09/11/2017     Priority: Medium     Calculus of gallbladder without cholecystitis 09/11/2017     Priority: Medium     Rotator cuff arthropathy, right 08/28/2017     Priority: Medium     Chronic right shoulder pain 08/10/2017     Priority: Medium     Right shoulder pain, unspecified chronicity 08/10/2017     Priority: Medium     Dry mouth 08/10/2017     Priority: Medium     Primary osteoarthritis involving multiple joints 06/16/2017     Priority: Medium     Cataract, bilateral 03/30/2017     Priority: Medium     Type 2 diabetes mellitus without complication, without long-term current use of insulin (H) 02/16/2017     Priority: Medium     Hypothyroidism, unspecified type 02/14/2017     Priority: Medium     Insomnia, unspecified 02/14/2017     Priority: Medium     OAB (overactive bladder) 09/07/2016     Priority: Medium     Essential hypertension with goal blood pressure less than 140/90 09/07/2016     Priority: Medium     Osteoarthritis of hip 08/21/2014     Priority: Medium     Do you wish to do the replacement in the background? yes         DDD (degenerative disc disease), lumbar 08/21/2014     Priority: Medium     Hearing aid worn 05/30/2014     Priority: Medium     Right ear       Hip pain 09/27/2013     Priority: Medium     Sciatica of right side 06/28/2013     Priority: Medium     Asymptomatic carotid artery stenosis 05/02/2013     Priority: Medium     Hyperlipidemia with target LDL less than 100      Priority: Medium     Diagnosis updated by  automated process. Provider to review and confirm.       History of renal calculi      Priority: Medium     Nevus 04/10/2013     Priority: Medium     Do you wish to do the replacement in the background? yes         Advance Care Planning 04/04/2013     Priority: Medium     Advance Care Planning:   Receipt of ACP document:  Received: Health Care Directive which was witnessed or notarized on 3/18/10.  Document not previously scanned.  Validation form completed and sent with document to be scanned.  Code Status reflects choices in most recent ACP document.  Confirmed/documented designated decision maker(s). See permanent comments section of demographics in clinical tab. View document(s) and details by clicking on code status.   Added by Jolie Wagner on 4/23/2014.                   Arthritis      Priority: Medium        Past Medical History:    Past Medical History:   Diagnosis Date     Acute cholecystitis 3/23/2018     Acute kidney injury (H) 9/10/2017     Arthritis      Dehydration 9/4/2017     Diabetes type 2, controlled (H)      Elevated lactic acid level 9/10/2017     GERD (gastroesophageal reflux disease)      History of renal calculi      HTN, goal below 140/90      Hyperlipidaemia LDL goal < 100      Hypokalemia 9/3/2017     Hypothyroid      Insomnia      Left carotid stenosis      Migraine      Motion sickness      PONV (postoperative nausea and vomiting)      Sciatica of right side 6/28/2013       Past Surgical History:    Past Surgical History:   Procedure Laterality Date     BUNIONECTOMY      Right foot     CATARACT IOL, RT/LT Bilateral 2017     COLONOSCOPY  7/12/2013    Procedure: COLONOSCOPY;  Colonoscopy;  Surgeon: Giovany Espinoza MD;  Location:  GI     COLONOSCOPY N/A 1/31/2019    Procedure: Combined Colonoscopy, Single Or Multiple Biopsy/Polypectomy By Biopsy;  Surgeon: Efren Osorio MD;  Location: [facility] OR     COLONOSCOPY WITH CO2 INSUFFLATION N/A 1/31/2019    Procedure:  COLONOSCOPY WITH CO2 INSUFFLATION;  Surgeon: Efren Osorio MD;  Location: [facility] OR     FRACTURE TX, ANKLE RT/LT      Left ankle     HC CYSTOURETHROSCOPY W/ URETEROSCOPY &/OR PYELOSCOPY; W/ LITHOTRIPSY       HC REVISE MEDIAN N/CARPAL TUNNEL SURG      Right wrist     INJECT EPIDURAL LUMBAR Right 12/27/2018    Procedure: INJECT EPIDURAL LUMBAR right foraminal narrowing severe at L4-L5 and moderate at L5-S1;  Surgeon: Gilbert Mcclure MD;  Location: [facility] OR     INJECT JOINT SACROILIAC  4/10/2014    Procedure: INJECT JOINT SACROILIAC;  Sacroiliac Joint Injection;  Surgeon: Gilbert Mcclure MD;  Location: [facility] OR     LAPAROSCOPIC CHOLECYSTECTOMY N/A 3/24/2018    Procedure: LAPAROSCOPIC CHOLECYSTECTOMY;  Laparoscopic Cholecystectomy;  Surgeon: Berry Allen DO;  Location: [facility] OR       Family History:    Family History   Problem Relation Age of Onset     Hypertension Brother      Cerebrovascular Disease Brother      Diabetes Father      Cardiovascular Father      Diabetes Brother         Borderline     Breast Cancer Mother      Diabetes Brother      Blood Disease Brother      Cardiovascular Brother         MI       Social History:  Marital Status:   [2]  Social History     Tobacco Use     Smoking status: Never Smoker     Smokeless tobacco: Never Used   Substance Use Topics     Alcohol use: Yes     Drug use: No        Medications:      cyclobenzaprine (FLEXERIL) 10 MG tablet   methylPREDNISolone (MEDROL DOSEPAK) 4 MG tablet therapy pack   ACCU-CHEK COMPACT PLUS test strip   acetaminophen (TYLENOL) 325 MG tablet   blood glucose (NO BRAND SPECIFIED) lancets standard   blood glucose monitoring (SOFTCLIX) lancets   cholestyramine (QUESTRAN) 4 GM/DOSE powder   diltiazem ER COATED BEADS (CARDIAZEM LA) 120 MG 24 hr tablet   levothyroxine (SYNTHROID/LEVOTHROID) 75 MCG tablet   lisinopril (PRINIVIL/ZESTRIL) 40 MG tablet   Multiple Vitamins-Minerals (MULTIVITAL PO)   oxybutynin (DITROPAN) 5 MG tablet  "  pantoprazole (PROTONIX) 40 MG EC tablet   potassium chloride ER (K-TAB/KLOR-CON) 10 MEQ CR tablet   pravastatin (PRAVACHOL) 40 MG tablet         Review of Systems   All other systems reviewed and are negative.      Physical Exam   BP: (!) 187/98  Pulse: 72  Temp: 98.1  F (36.7  C)  Resp: 17  Height: 165.1 cm (5' 5\")  Weight: 85.3 kg (188 lb)  SpO2: 94 %      Physical Exam   Constitutional: She is oriented to person, place, and time. She appears well-developed and well-nourished. No distress.   HENT:   Head: Normocephalic and atraumatic.   Eyes: EOM are normal. No scleral icterus.   Neck: Normal range of motion. Neck supple.   Cardiovascular: Normal rate and regular rhythm.   Pulmonary/Chest: Effort normal and breath sounds normal.   Abdominal: Soft. Bowel sounds are normal. She exhibits no distension and no mass. There is no tenderness. There is no rebound and no guarding. No hernia.   Musculoskeletal:        Arms:  Lumbosacral spine area reveals no mass.  Full but painful lumbosacral range of motion is noted. Straight leg raise is negative at 60 degrees on both sides. DTR's, motor strength and sensation normal, including heel and toe gait.  Peripheral pulses are palpable. Hips and knees have full range of motion without pain.  No SI joint discomfort.  No pain with any mobilization techniques of the SI joint.   Neurological: She is alert and oriented to person, place, and time.   Skin: Skin is warm and dry. No rash noted. She is not diaphoretic. No erythema. No pallor.   Psychiatric: She has a normal mood and affect. Her behavior is normal. Thought content normal.   Nursing note and vitals reviewed.      ED Course        Procedures               Critical Care time:  none               No results found for this or any previous visit (from the past 24 hour(s)).    Medications   Lidocaine (LIDOCARE) 4 % Patch 1 patch (1 patch Transdermal Given 3/19/19 1933)       Assessments & Plan (with Medical Decision Making)   "   DDD (degenerative disc disease), lumbar  Back muscle spasm     79 year old female with a history of extensive degenerative changes in the lumbar spine documented on previous x-rays and MRI who presents for evaluation of back pain with some radiation to the left lower abdomen as well as a sore spot in the very inferior aspect of her low back more in the gluteus muscle area.  She denies any injury.  She reports that she is rather immobile and does not move around much.  She pretty much lays in bed or lays in her easy chair during the day.  She denies any numbness or tingling into the lower extremities.  No loss of bowel/bladder function.  No fevers or chills.  On exam blood pressure 187/98, but the patient states that she has not taken her antihypertensive medication yet today.  This is likely the cause for her elevated blood pressure.  Pulse 72, temperature 98.1, and oxygen saturation 94% on room air.  Patient with no obvious abnormality upon inspection of her back.  She has pretty good range of motion, but notes some discomfort in the very inferior aspect of the left low back when she rotates to the right.  She has tenderness and what appears to be a muscle spasm of her superior gluteus muscle.  Some tenderness of the L2/L3 spinous process area but no paravertebral muscular tenderness.  SLR test negative.  Hips with good range of motion and this does not elicit any discomfort.  No SI joint tenderness or pain with range of motion of the SI joint.  I reviewed her chart in great detail including her previous x-ray and MRI results.  Her current symptoms are consistent with a flare of her L2/L3 disc disease and likely having some left radicular symptoms.  It appears that she is having some gluteal muscle spasm, is this discomfort is much lower than the typical paravertebral musculature.  I think these issues are 2 different concerns.  Past history diabetes, but no current diabetes per her report.  We did try a Medrol  "Dosepak to decrease inflammation in her upper lumbar spine.  Monitor blood sugars closely to ensure that she does not get too significant of a hypoglycemia reaction.  Keep carbohydrate intake to a minimum.  Lidoderm patch applied to the left gluteal region.  This can be replaced in 18 hours if it works well for her.  Heat to be applied for 20 minutes every 2-3 hours as needed.  Stretching exercises recommended for her lumbar spine.  It is very limited what she can do as she states she is unable to go on all fours to do the \"cat and camel \"exercise.  I instructed her on how to do a modified version of this when she was laying down or sitting in a chair.  Hopefully this would help mobilize her lumbar spine and help minimize her symptoms.  In the event that she is unable to sleep with her symptoms, we did try a low dose of cyclobenzaprine 5 mg every 8 hours as needed for breakthrough symptoms.  Do not drive after taking this medication.  Risk of sedation, lightheadedness, and other central neurological symptoms discussed with this medication.  Use it very cautiously.  Working appointment with her PCP requested for Friday of this week to recheck her progress.  If not improving, they could consider referral to Dr. Mcclure for consideration of L2/L3 epidural steroid injection given how well this has worked for her in the past.  The patient and her daughter-in-law were in agreement with this plan and she was suitable for discharge.     I have reviewed the nursing notes.    I have reviewed the findings, diagnosis, plan and need for follow up with the patient.          Medication List      Started    cyclobenzaprine 10 MG tablet  Commonly known as:  FLEXERIL  5 mg, Oral, 3 TIMES DAILY PRN     methylPREDNISolone 4 MG tablet therapy pack  Commonly known as:  MEDROL DOSEPAK  Follow package directions.            Final diagnoses:   DDD (degenerative disc disease), lumbar   Back muscle spasm     This document serves as a record of " services personally performed by Malcolm Zuluaga PA-C. It was created on their behalf by Andreia Caballero, a trained medical scribe. The creation of this record is based on the provider's personal observations and the statements of the patient. This document has been checked and approved by the attending provider.    Note: Chart documentation done in part with Dragon Voice Recognition software. Although reviewed after completion, some word and grammatical errors may remain.    3/19/2019   Malcolm Zuluaga PA-C   [facility] EMERGENCY DEPARTMENT     Malcolm Zuluaga PA-C  03/19/19 2016

## 2019-03-19 NOTE — ED AVS SNAPSHOT
[facility] Emergency Department  [address] [place] [address]  Phone:  [phone]  Fax:  [phone]                                    Mya Hui   MRN: [id number]    Department:  [facility] Emergency Department   Date of Visit:  3/19/2019           After Visit Summary Signature Page    I have received my discharge instructions, and my questions have been answered. I have discussed any challenges I see with this plan with the nurse or doctor.    ..........................................................................................................................................  Patient/Patient Representative Signature      ..........................................................................................................................................  Patient Representative Print Name and Relationship to Patient    ..................................................               ................................................  Date                                   Time    ..........................................................................................................................................  Reviewed by Signature/Title    ...................................................              ..............................................  Date                                               Time          22EPIC Rev 08/18

## 2019-03-20 NOTE — DISCHARGE INSTRUCTIONS
It was a pleasure working with you today!  I hope your condition improves rapidly!     Please try the lidocaine patch.  It can stay on for up to 18 hours.  If it works well, please replace it with a new one that you can purchase over-the-counter.  Please use heat over your muscle spasm area for 20 minutes every couple hours.  Try the stretching exercises that we demonstrated and discussed to loosen up the muscles and release some of the tension in the spine.    I am concerned that you are having some radiating pain from the L2/L3 disc area that looks abnormal on your recent MRI.  The  Medrol Dosepak, strong anti-inflammatory, will hopefully help decrease some of the swelling on the nerve.  Please monitor your blood sugars closely during this time and keep your carbohydrate intake under control, as this medication can cause elevated blood sugars.  It is okay to try the cyclobenzaprine as needed for muscle spasm.  Try a low dose of 5 mg initially.  Please be careful, as this medication can cause sedation and tiredness.  Please do not drive for 8 hours after taking this medication.

## 2019-03-21 ENCOUNTER — TELEPHONE (OUTPATIENT)
Dept: FAMILY MEDICINE | Facility: CLINIC | Age: 79
End: 2019-03-21

## 2019-03-21 NOTE — TELEPHONE ENCOUNTER
Panel Management Review      Patient has the following on her problem list:     Diabetes    ASA: Passed    Last A1C  Lab Results   Component Value Date    A1C 5.7 03/08/2019    A1C 5.7 10/10/2018    A1C 6.2 04/03/2018    A1C 6.1 09/04/2017    A1C 5.9 08/10/2017     A1C tested: Passed    Last LDL:    Lab Results   Component Value Date    CHOL 117 03/08/2019     Lab Results   Component Value Date    HDL 46 03/08/2019     Lab Results   Component Value Date    LDL 38 03/08/2019     Lab Results   Component Value Date    TRIG 165 03/08/2019     Lab Results   Component Value Date    CHOLHDLRATIO 3.1 05/22/2015     Lab Results   Component Value Date    NHDL 71 03/08/2019       Is the patient on a Statin? YESYES             Is the patient on Aspirin? YES    Medications     [facility] CoA Reductase Inhibitors     pravastatin (PRAVACHOL) 40 MG tablet             Last three blood pressure readings:  BP Readings from Last 3 Encounters:   03/19/19 (!) 187/98   03/14/19 (!) 166/94   03/08/19 (!) 160/100       Date of last diabetes office visit: 10/18     Tobacco History:     History   Smoking Status     Never Smoker   Smokeless Tobacco     Never Used           Composite cancer screening  Chart review shows that this patient is due/due soon for the following None  Summary:    Patient is due/failing the following:   BP CHECK    Action needed:   Recheck bp    Type of outreach:    pt scheduled for appt 3/22/19    Questions for provider review:    None                                                                                                                                    ACase/MA       Chart routed to  .

## 2019-03-22 ENCOUNTER — OFFICE VISIT (OUTPATIENT)
Dept: FAMILY MEDICINE | Facility: CLINIC | Age: 79
End: 2019-03-22
Payer: COMMERCIAL

## 2019-03-22 VITALS
BODY MASS INDEX: 31.45 KG/M2 | SYSTOLIC BLOOD PRESSURE: 168 MMHG | RESPIRATION RATE: 22 BRPM | OXYGEN SATURATION: 94 % | WEIGHT: 189 LBS | HEART RATE: 80 BPM | TEMPERATURE: 96.9 F | DIASTOLIC BLOOD PRESSURE: 98 MMHG

## 2019-03-22 DIAGNOSIS — I10 ESSENTIAL HYPERTENSION WITH GOAL BLOOD PRESSURE LESS THAN 140/90: Primary | ICD-10-CM

## 2019-03-22 DIAGNOSIS — K21.9 GASTROESOPHAGEAL REFLUX DISEASE, ESOPHAGITIS PRESENCE NOT SPECIFIED: Primary | ICD-10-CM

## 2019-03-22 PROCEDURE — 99214 OFFICE O/P EST MOD 30 MIN: CPT | Performed by: NURSE PRACTITIONER

## 2019-03-22 RX ORDER — DILTIAZEM HYDROCHLORIDE 120 MG/1
120 CAPSULE, EXTENDED RELEASE ORAL
Qty: 90 CAPSULE | Refills: 0
Start: 2019-03-22 | End: 2019-04-15

## 2019-03-22 NOTE — PROGRESS NOTES
SUBJECTIVE:   Mya Hui is a 79 year old female who presents to clinic today for the following health issues:        ED/UC Followup:    Facility:  [facility]  Date of visit: 3/19/19  Reason for visit: hip pain  Current Status: doing better, still having some trouble getting out of bed, or recliner. Medications are helping with pain         Mya is seen in clinic today to follow-up a visit to the ER 3/19 for severe low back pain.  She has a history of degenerative joint disease and arthritic changes involving most joints.  She has significant degenerative disc disease of the lumbar spine.  She had an MRI of the lumbar spine through [facility] 12/12/18.  This did show some nerve impingement, and she underwent right epidural injection 12/27/18 by Dr. Mcclure for treatment of right-sided lumbar back pain with pain radiating throughout the right lower extremity into her toes.  She had a very good result with that injection.  She and her daughter were thinking she would benefit from the same type of injection again.  She localizes the pain directly over the left SI joint.  She denies radiation of pain through the buttock or down the left lower extremity.  She notes no weakness in the left lower extremity.  She has a lidocaine patch applied to the area and it is modestly helpful.    She also continues to have increased blood pressure.  She was recently started on diltiazem extended release 120 mg daily.  Her blood pressure remains elevated.    Problem list and histories reviewed & adjusted, as indicated.  Additional history: as documented    BP Readings from Last 3 Encounters:   03/22/19 (!) 168/98   03/19/19 (!) 187/98   03/14/19 (!) 166/94    Wt Readings from Last 3 Encounters:   03/22/19 85.7 kg (189 lb)   03/19/19 85.3 kg (188 lb)   03/14/19 85.3 kg (188 lb)                    Reviewed and updated as needed this visit by clinical staff       Reviewed and updated as needed this visit by Provider          ROS:  Constitutional, HEENT, cardiovascular, pulmonary, gi and gu systems are negative, except as otherwise noted.    OBJECTIVE:     BP (!) 168/98   Pulse 80   Temp 96.9  F (36.1  C) (Temporal)   Resp 22   Wt 85.7 kg (189 lb)   SpO2 94%   BMI 31.45 kg/m    Body mass index is 31.45 kg/m .   Mya is accompanied by her daughter.  She ambulates rather slowly with an antalgic gait, using a walker for support.  Heart is regular, her lungs are clear.  She has tenderness to palpation directly over the SI joint and and surrounding soft tissue.  There is no pain to palpation over the left trochanteric area.  She moves very slowly and cautiously.      ASSESSMENT/PLAN:     Problem List Items Addressed This Visit        Medium    Essential hypertension with goal blood pressure less than 140/90 - Primary    Relevant Medications    diltiazem ER (TIAZAC) 120 MG 24 hr ER beaded capsule           The patient has an appointment scheduled with Dr. Amaya 4/2.  She scheduled this appointment for left hip pain.  However, this does not seem to be in the left hip, and x-rays from last year show minimal degenerative change.  I do not think her severe pain is coming from the hip.  This could possibly be from the lumbar spine, she does have a small impingement on the left L4 nerve root.  However, her tenderness is localized to the SI joint without radiation.  I suspect this may be more of an osteoarthritic issue with that joint.  She would like to have a steroid injection.  She has an appointment as stated with Dr. Amaya on 4/2.  If he does not feel that an SI joint injection would be indicated, will refer to Dr. Mcclure for possible epidural injection.    Her chronically elevated blood pressure may possibly be related to her chronic pain.  I have increased her blood pressure medication to diltiazem extended release 120 mg every 12 hours.  She will monitor her blood pressure and let me know how she tolerates the increased dose and  how her blood pressure responds    This was a 30-minute appointment of which greater than 50% of time was spent reviewing previous x-rays, MRI, current symptoms and developing a plan of care      MAYI Anthony [facility]

## 2019-03-25 RX ORDER — PANTOPRAZOLE SODIUM 40 MG/1
TABLET, DELAYED RELEASE ORAL
Qty: 180 TABLET | Refills: 3 | Status: SHIPPED | OUTPATIENT
Start: 2019-03-25 | End: 2020-01-20

## 2019-03-25 NOTE — TELEPHONE ENCOUNTER
Protonix  Last Written Prescription Date:  5/2/18  Last Fill Quantity: 180 ,  # refills: 2   Last office visit: 3/22/2019 with prescribing provider:     Future Office Visit:   Next 5 appointments (look out 90 days)    May 28, 2019  3:00 PM CDT  Return Visit with Buddy Ott PA-C  [facility] [address] [place] [id number] [phone]         Prescription approved per [facility] Refill Protocol............BALAJI Cordova

## 2019-03-26 ENCOUNTER — TELEPHONE (OUTPATIENT)
Dept: FAMILY MEDICINE | Facility: CLINIC | Age: 79
End: 2019-03-26

## 2019-03-26 DIAGNOSIS — M53.3 SACROILIAC JOINT PAIN: Primary | ICD-10-CM

## 2019-04-01 ENCOUNTER — TRANSFERRED RECORDS (OUTPATIENT)
Dept: HEALTH INFORMATION MANAGEMENT | Facility: CLINIC | Age: 79
End: 2019-04-01

## 2019-04-02 ENCOUNTER — TELEPHONE (OUTPATIENT)
Dept: FAMILY MEDICINE | Facility: CLINIC | Age: 79
End: 2019-04-02

## 2019-04-02 ENCOUNTER — OFFICE VISIT (OUTPATIENT)
Dept: FAMILY MEDICINE | Facility: CLINIC | Age: 79
End: 2019-04-02
Payer: COMMERCIAL

## 2019-04-02 ENCOUNTER — TELEPHONE (OUTPATIENT)
Dept: SURGERY | Facility: CLINIC | Age: 79
End: 2019-04-02

## 2019-04-02 DIAGNOSIS — M53.3 SACROILIAC JOINT PAIN: ICD-10-CM

## 2019-04-02 DIAGNOSIS — Z01.818 PREOP GENERAL PHYSICAL EXAM: Primary | ICD-10-CM

## 2019-04-02 PROCEDURE — 99214 OFFICE O/P EST MOD 30 MIN: CPT | Performed by: NURSE PRACTITIONER

## 2019-04-02 RX ORDER — MIRABEGRON 50 MG/1
50 TABLET, EXTENDED RELEASE ORAL DAILY
COMMUNITY
End: 2019-07-18

## 2019-04-02 NOTE — TELEPHONE ENCOUNTER
Contacted patient to schedule SI injection  Date: 4/11/19  Time: 230pm  Dr. Mcclure    Instructed pt to have H&P and  for procedure.

## 2019-04-02 NOTE — PROGRESS NOTES
[address] [address]  [phone]  Dept: [phone]    PRE-OP EVALUATION:  Today's date: 2019    Mya Hui (: 1940) presents for pre-operative evaluation assessment as requested by Dr. Mcclure.  She requires evaluation and anesthesia risk assessment prior to undergoing surgery/procedure for treatment of back pain .    Proposed Surgery/ Procedure: INJECT JOINT SACROILIAC- LEFT  Date of Surgery/ Procedure: 19  Time of Surgery/ Procedure: [facility]  Hospital/Surgical Facility: [facility]  Fax number for surgical facility:   Primary Physician: Chanell Nelson  Type of Anesthesia Anticipated: MAC    Patient has a Health Care Directive or Living Will:  YES     1. NO - Do you have a history of heart attack, stroke, stent, bypass or surgery on an artery in the head, neck, heart or legs?  2. NO - Do you ever have any pain or discomfort in your chest?  3. NO - Do you have a history of  Heart Failure?  4. NO - Are you troubled by shortness of breath when: walking on the level, up a slight hill or at night?  5. NO - Do you currently have a cold, bronchitis or other respiratory infection?  6. NO - Do you have a cough, shortness of breath or wheezing?  7. NO - Do you sometimes get pains in the calves of your legs when you walk?  8. yes - Do you or anyone in your family have previous history of blood clots? daughter  9. yes - Do you or does anyone in your family have a serious bleeding problem such as prolonged bleeding following surgeries or cuts? daughter  10. yes - Have you ever had problems with anemia or been told to take iron pills? Patient did, unknown why  11. NO - Have you had any abnormal blood loss such as black, tarry or bloody stools, or abnormal vaginal bleeding?  12. NO - Have you ever had a blood transfusion?  13. NO - Have you or any of your relatives ever had problems with anesthesia?  14. yes - Do you have sleep apnea, excessive snoring or  daytime drowsiness? Always tired  15. NO - Do you have any prosthetic heart valves?  16. NO - Do you have prosthetic joints?  17. NO - Is there any chance that you may be pregnant?      HPI:     HPI related to upcoming procedure: Mya has a history of degenerative joint disease and arthritic changes throughout the spine and hips.  She most recently has been having severe pain in the in the left SI joint.  She was referring to having pain in her hip, but holds her hand over the left SI joint.  She denied radiation of pain through the buttock or down the left lower extremity when seen in clinic on 3/22.  Previously had thought perhaps her pain was related to the lumbar spine.  However, at that appointment the pain appeared localized over the SI joint, subsequently she has been scheduled for an SI joint injection with Dr. Mcclure in radiology.      DIABETES - Patient has a longstanding history of DiabetesType Type II . Patient is being treated with oral agents and denies significant side effects. Control has been good. Complicating factors include but are not limited to: hypertension.                                                                                                                            .  HYPERTENSION - Patient has longstanding history of HTN , currently denies any symptoms referable to elevated blood pressure. Specifically denies chest pain, palpitations, dyspnea, orthopnea, PND or peripheral edema. Blood pressure readings are in the 150-160 systolic range. Current medication regimen is as listed below. Patient denies any side effects of medication.                                                                                                                                                                                          .  HYPOTHYROIDISM - Patient has a longstanding history of chronic Hypothyroidism. Patient has been doing well, noting no tremor, insomnia, hair loss or changes in skin  texture. Continues to take medications as directed, without adverse reactions or side effects. Last TSH   Lab Results   Component Value Date    TSH 4.77 (H) 03/08/2019   .                                                                                                                                                                                                                        .    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     Priority: Medium     Cherry angioma 04/03/2018     Priority: Medium     Gastroesophageal reflux disease, esophagitis presence not specified 09/14/2017     Priority: Medium     Sliding hiatal hernia 09/11/2017     Priority: Medium     Calculus of gallbladder without cholecystitis 09/11/2017     Priority: Medium     Rotator cuff arthropathy, right 08/28/2017     Priority: Medium     Chronic right shoulder pain 08/10/2017     Priority: Medium     Right shoulder pain, unspecified chronicity 08/10/2017     Priority: Medium     Dry mouth 08/10/2017     Priority: Medium     Primary osteoarthritis involving multiple joints 06/16/2017     Priority: Medium     Cataract, bilateral 03/30/2017     Priority: Medium     Type 2 diabetes mellitus without complication, without long-term current use of insulin (H) 02/16/2017     Priority: Medium     Hypothyroidism, unspecified type 02/14/2017     Priority: Medium     Insomnia, unspecified 02/14/2017     Priority: Medium     OAB (overactive bladder) 09/07/2016     Priority: Medium     Essential hypertension with goal blood pressure less than 140/90 09/07/2016     Priority: Medium     Osteoarthritis of hip 08/21/2014     Priority: Medium     Do you wish to do the replacement in the background? yes         DDD (degenerative disc disease), lumbar 08/21/2014     Priority: Medium     Hearing aid worn 05/30/2014     Priority: Medium      Right ear       Hip pain 09/27/2013     Priority: Medium     Sciatica of right side 06/28/2013     Priority: Medium     Asymptomatic carotid artery stenosis 05/02/2013     Priority: Medium     Hyperlipidemia with target LDL less than 100      Priority: Medium     Diagnosis updated by automated process. Provider to review and confirm.       History of renal calculi      Priority: Medium     Nevus 04/10/2013     Priority: Medium     Do you wish to do the replacement in the background? yes         Advance Care Planning 04/04/2013     Priority: Medium     Advance Care Planning:   Receipt of ACP document:  Received: Health Care Directive which was witnessed or notarized on 3/18/10.  Document not previously scanned.  Validation form completed and sent with document to be scanned.  Code Status reflects choices in most recent ACP document.  Confirmed/documented designated decision maker(s). See permanent comments section of demographics in clinical tab. View document(s) and details by clicking on code status.   Added by Jolie Wagner on 4/23/2014.                   Arthritis      Priority: Medium      Past Medical History:   Diagnosis Date     Acute cholecystitis 3/23/2018     Acute kidney injury (H) 9/10/2017     Arthritis      Dehydration 9/4/2017     Diabetes type 2, controlled (H)      Elevated lactic acid level 9/10/2017     GERD (gastroesophageal reflux disease)      History of renal calculi      HTN, goal below 140/90      Hyperlipidaemia LDL goal < 100      Hypokalemia 9/3/2017     Hypothyroid      Insomnia      Left carotid stenosis      Migraine      Motion sickness      PONV (postoperative nausea and vomiting)      Sciatica of right side 6/28/2013     Past Surgical History:   Procedure Laterality Date     BUNIONECTOMY      Right foot     CATARACT IOL, RT/LT Bilateral 2017     COLONOSCOPY  7/12/2013    Procedure: COLONOSCOPY;  Colonoscopy;  Surgeon: Giovany Espinoza MD;  Location:  GI     COLONOSCOPY N/A  1/31/2019    Procedure: Combined Colonoscopy, Single Or Multiple Biopsy/Polypectomy By Biopsy;  Surgeon: Efren Osorio MD;  Location: [facility] OR     COLONOSCOPY WITH CO2 INSUFFLATION N/A 1/31/2019    Procedure: COLONOSCOPY WITH CO2 INSUFFLATION;  Surgeon: Efren Osorio MD;  Location: [facility] OR     FRACTURE TX, ANKLE RT/LT      Left ankle     HC CYSTOURETHROSCOPY W/ URETEROSCOPY &/OR PYELOSCOPY; W/ LITHOTRIPSY       HC REVISE MEDIAN N/CARPAL TUNNEL SURG      Right wrist     INJECT EPIDURAL LUMBAR Right 12/27/2018    Procedure: INJECT EPIDURAL LUMBAR right foraminal narrowing severe at L4-L5 and moderate at L5-S1;  Surgeon: Gilbert Mcclure MD;  Location: [facility] OR     INJECT JOINT SACROILIAC  4/10/2014    Procedure: INJECT JOINT SACROILIAC;  Sacroiliac Joint Injection;  Surgeon: Gilbert Mcclure MD;  Location: [facility] OR     LAPAROSCOPIC CHOLECYSTECTOMY N/A 3/24/2018    Procedure: LAPAROSCOPIC CHOLECYSTECTOMY;  Laparoscopic Cholecystectomy;  Surgeon: Berry Allen DO;  Location: [facility] OR     Current Outpatient Medications   Medication Sig Dispense Refill     ACCU-CHEK COMPACT PLUS test strip USE TO TEST BLOOD BLOOD SUGARS ONCE DAILY OR AS DIRECTED 102 each 3     acetaminophen (TYLENOL) 325 MG tablet Take 2 tablets (650 mg) by mouth every 4 hours as needed for mild pain 100 tablet      blood glucose (NO BRAND SPECIFIED) lancets standard Glucose test strips  Use to test blood sugar 1 times daily or as directed. 1 Box 11     blood glucose monitoring (SOFTCLIX) lancets USE TO TEST BLOOD SUGARS ONCE DAILY OR AS DIRECTED 100 each 11     cholestyramine (QUESTRAN) 4 GM/DOSE powder Take 4 g by mouth 2 times daily (with meals) 378 g 11     cyclobenzaprine (FLEXERIL) 10 MG tablet Take 0.5 tablets (5 mg) by mouth 3 times daily as needed for muscle spasms 15 tablet 0     diltiazem ER (TIAZAC) 120 MG 24 hr ER beaded capsule Take 1 capsule (120 mg) by mouth 2 times daily 90 capsule 0     levothyroxine  (SYNTHROID/LEVOTHROID) 75 MCG tablet TAKE ONE TABLET BY MOUTH ONCE DAILY 60 tablet 8     lisinopril (PRINIVIL/ZESTRIL) 40 MG tablet Take 1 tablet (40 mg) by mouth daily 90 tablet 1     mirabegron (MYRBETRIQ) 50 MG 24 hr tablet Take 50 mg by mouth daily       Multiple Vitamins-Minerals (MULTIVITAL PO) Take  by mouth. daily       pantoprazole (PROTONIX) 40 MG EC tablet TAKE ONE TABLET BY MOUTH TWICE A DAY - TAKE 30 TO 60 MINUTES BEFORE A MEAL 180 tablet 3     pantoprazole (PROTONIX) 40 MG EC tablet Take 1 tablet (40 mg) by mouth 2 times daily Take 30-60 minutes before a meal. (Patient taking differently: Take 40 mg by mouth daily Take 30-60 minutes before a meal.) 180 tablet 2     potassium chloride ER (K-TAB/KLOR-CON) 10 MEQ CR tablet TAKE ONE TABLET BY MOUTH THREE TIMES A DAY 90 tablet 11     pravastatin (PRAVACHOL) 40 MG tablet TAKE ONE TABLET BY MOUTH ONCE DAILY 30 tablet 11     methylPREDNISolone (MEDROL DOSEPAK) 4 MG tablet therapy pack Follow package directions. 21 tablet 0     OTC products: None, except as noted above    Allergies   Allergen Reactions     Codeine Nausea     Fentanyl Nausea and Vomiting     VERY sensitive to most narcotics if not all.      Latex Allergy: NO    Social History     Tobacco Use     Smoking status: Never Smoker     Smokeless tobacco: Never Used   Substance Use Topics     Alcohol use: Yes     History   Drug Use No       REVIEW OF SYSTEMS:   CONSTITUTIONAL: NEGATIVE for fever, chills, change in weight  ENT/MOUTH: NEGATIVE for ear, mouth and throat problems  RESP: NEGATIVE for significant cough or SOB  CV: NEGATIVE for chest pain, palpitations or peripheral edema    EXAM:   BP (P) 152/78   Pulse (P) 75   Temp (P) 97.1  F (36.2  C) (Temporal)   Resp (P) 22   Wt (P) 87.2 kg (192 lb 3.2 oz)   SpO2 (P) 96%   BMI (P) 31.98 kg/m    GENERAL APPEARANCE: healthy, alert and no distress  HENT: ear canals and TM's normal and nose and mouth without ulcers or lesions  RESP: lungs clear to  auscultation - no rales, rhonchi or wheezes  CV: regular rate and rhythm, normal S1 S2, with a grade 2/6 systolic murmur  ABDOMEN: soft, nontender, no HSM or masses and bowel sounds normal  NEURO: Normal strength and tone, sensory exam grossly normal, mentation intact and speech normal    DIAGNOSTICS:   No labs or EKG required for low risk surgery (cataract, skin procedure, breast biopsy, etc)    Recent Labs   Lab Test 03/08/19  0927 10/10/18  0752  03/23/18  2105 09/18/17  1458 09/14/17  1626   HGB  --   --   --  13.3 11.6* 10.5*   PLT  --   --   --  225  --  263    146*  --  142  --  144   POTASSIUM 4.2 3.7   < > 3.8  --  3.5   CR 1.07* 1.11*  --  0.82 1.16* 1.08*   A1C 5.7* 5.7*   < >  --   --   --     < > = values in this interval not displayed.        IMPRESSION:   Reason for surgery/procedure: INJECT JOINT SACROILIAC- LEFT    Diagnosis/reason for consult: No medical contraindication noted to proceeding as planned    The proposed surgical procedure is considered LOW risk.    REVISED CARDIAC RISK INDEX  The patient has the following serious cardiovascular risks for perioperative complications such as (MI, PE, VFib and 3  AV Block):  No serious cardiac risks  INTERPRETATION: 0 risks: Class I (very low risk - 0.4% complication rate)    The patient has the following additional risks for perioperative complications:  No identified additional risks      ICD-10-CM    1. Preop general physical exam Z01.818    2. Sacroiliac joint pain M53.3        RECOMMENDATIONS:       --Patient is to take all scheduled medications on the day of surgery EXCEPT for modifications listed below.    Diabetes Medication Use  -----Hold usual oral and non-insulin diabetic meds (e.g. Metformin, Actos, Glipizide) while NPO.  Procedure is not scheduled until 2:30 in the morning.  I anticipate she will be allowed to eat a small amount in the morning, so we will take her diabetic meds      ACE Inhibitor or Angiotensin Receptor Blocker (ARB)  Use  Ace inhibitor or Angiotensin Receptor Blocker (ARB) and should HOLD this medication for the 24 hours prior to surgery.      APPROVAL GIVEN to proceed with proposed procedure, without further diagnostic evaluation       Signed Electronically by: MAYI Anthony CNP    Copy of this evaluation report is provided to requesting physician.    [facility] Preop Guidelines    Revised Cardiac Risk Index

## 2019-04-02 NOTE — PATIENT INSTRUCTIONS
Before Your Surgery      Call your surgeon if there is any change in your health. This includes signs of a cold or flu (such as a sore throat, runny nose, cough, rash or fever).    Do not smoke, drink alcohol or take over the counter medicine (unless your surgeon or primary care doctor tells you to) for the 24 hours before and after surgery.    If you take prescribed drugs: Follow your doctor s orders about which medicines to take and which to stop until after surgery.    Eating and drinking prior to surgery: follow the instructions from your surgeon    Take a shower or bath the night before surgery. Use the soap your surgeon gave you to gently clean your skin. If you do not have soap from your surgeon, use your regular soap. Do not shave or scrub the surgery site.  Wear clean pajamas and have clean sheets on your bed.     Do not take Lisinopril day of procedure until after completed

## 2019-04-02 NOTE — TELEPHONE ENCOUNTER
Reason for Call:  Same Day Appointment, Requested Provider:  Chanell Nelson NP    PCP: Chanell Nelson    Reason for visit: Pre Op DOS 4/11 - Dr. Mcclure     Duration of symptoms:     Have you been treated for this in the past? Yes    Additional comments:     Can we leave a detailed message on this number? YES    Phone number patient can be reached at: Home number on file [phone] (home)    Best Time: any    Call taken on 4/2/2019 at 10:14 AM by Melissa Chowdhury

## 2019-04-03 DIAGNOSIS — I10 ESSENTIAL HYPERTENSION WITH GOAL BLOOD PRESSURE LESS THAN 140/90: ICD-10-CM

## 2019-04-04 RX ORDER — LISINOPRIL 20 MG/1
TABLET ORAL
Qty: 60 TABLET | Refills: 0 | OUTPATIENT
Start: 2019-04-04

## 2019-04-04 NOTE — TELEPHONE ENCOUNTER
Lisinopril  Prescription was sent 3/14/19 for #90 with 1 refills.  Pharmacy notified via E-Prescribe refusal.     BALAJI Cordova

## 2019-04-10 ENCOUNTER — ANESTHESIA EVENT (OUTPATIENT)
Dept: SURGERY | Facility: CLINIC | Age: 79
End: 2019-04-10
Payer: COMMERCIAL

## 2019-04-11 ENCOUNTER — ANESTHESIA (OUTPATIENT)
Dept: SURGERY | Facility: CLINIC | Age: 79
End: 2019-04-11
Payer: COMMERCIAL

## 2019-04-11 ENCOUNTER — HOSPITAL ENCOUNTER (OUTPATIENT)
Facility: CLINIC | Age: 79
Discharge: HOME | End: 2019-04-11
Attending: ANESTHESIOLOGY | Admitting: ANESTHESIOLOGY
Payer: COMMERCIAL

## 2019-04-11 ENCOUNTER — HOSPITAL ENCOUNTER (OUTPATIENT)
Dept: GENERAL RADIOLOGY | Facility: CLINIC | Age: 79
End: 2019-04-11
Attending: ANESTHESIOLOGY | Admitting: ANESTHESIOLOGY
Payer: COMMERCIAL

## 2019-04-11 VITALS
SYSTOLIC BLOOD PRESSURE: 152 MMHG | RESPIRATION RATE: 20 BRPM | OXYGEN SATURATION: 95 % | DIASTOLIC BLOOD PRESSURE: 85 MMHG | TEMPERATURE: 98 F | HEART RATE: 61 BPM

## 2019-04-11 DIAGNOSIS — M53.3 SACROILIAC JOINT PAIN: ICD-10-CM

## 2019-04-11 LAB — GLUCOSE BLDC GLUCOMTR-MCNC: 104 MG/DL (ref 70–99)

## 2019-04-11 PROCEDURE — 20550 NJX 1 TENDON SHEATH/LIGAMENT: CPT

## 2019-04-11 PROCEDURE — 20550 NJX 1 TENDON SHEATH/LIGAMENT: CPT | Mod: LT | Performed by: ANESTHESIOLOGY

## 2019-04-11 PROCEDURE — 25000125 ZZHC RX 250: Performed by: NURSE ANESTHETIST, CERTIFIED REGISTERED

## 2019-04-11 PROCEDURE — 82962 GLUCOSE BLOOD TEST: CPT

## 2019-04-11 PROCEDURE — 27096 INJECT SACROILIAC JOINT: CPT | Mod: LT | Performed by: ANESTHESIOLOGY

## 2019-04-11 PROCEDURE — 37000008 ZZH ANESTHESIA TECHNICAL FEE, 1ST 30 MIN: Performed by: ANESTHESIOLOGY

## 2019-04-11 PROCEDURE — 27096 INJECT SACROILIAC JOINT: CPT | Performed by: ANESTHESIOLOGY

## 2019-04-11 PROCEDURE — 40000277 XR SURGERY CARM FLUORO LESS THAN 5 MIN W STILLS: Mod: TC

## 2019-04-11 PROCEDURE — 25000128 H RX IP 250 OP 636: Performed by: NURSE ANESTHETIST, CERTIFIED REGISTERED

## 2019-04-11 RX ORDER — LIDOCAINE HYDROCHLORIDE 20 MG/ML
INJECTION, SOLUTION INFILTRATION; PERINEURAL PRN
Status: DISCONTINUED | OUTPATIENT
Start: 2019-04-11 | End: 2019-04-11

## 2019-04-11 RX ORDER — NALOXONE HYDROCHLORIDE 0.4 MG/ML
.1-.4 INJECTION, SOLUTION INTRAMUSCULAR; INTRAVENOUS; SUBCUTANEOUS
Status: DISCONTINUED | OUTPATIENT
Start: 2019-04-11 | End: 2019-04-11 | Stop reason: HOSPADM

## 2019-04-11 RX ORDER — PROPOFOL 10 MG/ML
INJECTION, EMULSION INTRAVENOUS PRN
Status: DISCONTINUED | OUTPATIENT
Start: 2019-04-11 | End: 2019-04-11

## 2019-04-11 RX ORDER — SODIUM CHLORIDE, SODIUM LACTATE, POTASSIUM CHLORIDE, CALCIUM CHLORIDE 600; 310; 30; 20 MG/100ML; MG/100ML; MG/100ML; MG/100ML
INJECTION, SOLUTION INTRAVENOUS CONTINUOUS
Status: DISCONTINUED | OUTPATIENT
Start: 2019-04-11 | End: 2019-04-11 | Stop reason: HOSPADM

## 2019-04-11 RX ORDER — ONDANSETRON 2 MG/ML
4 INJECTION INTRAMUSCULAR; INTRAVENOUS EVERY 30 MIN PRN
Status: DISCONTINUED | OUTPATIENT
Start: 2019-04-11 | End: 2019-04-11 | Stop reason: HOSPADM

## 2019-04-11 RX ORDER — ONDANSETRON 4 MG/1
4 TABLET, ORALLY DISINTEGRATING ORAL EVERY 30 MIN PRN
Status: DISCONTINUED | OUTPATIENT
Start: 2019-04-11 | End: 2019-04-11 | Stop reason: HOSPADM

## 2019-04-11 RX ADMIN — PROPOFOL 30 MG: 10 INJECTION, EMULSION INTRAVENOUS at 14:15

## 2019-04-11 RX ADMIN — PROPOFOL 50 MG: 10 INJECTION, EMULSION INTRAVENOUS at 14:14

## 2019-04-11 RX ADMIN — LIDOCAINE HYDROCHLORIDE 40 MG: 20 INJECTION, SOLUTION INFILTRATION; PERINEURAL at 14:14

## 2019-04-11 NOTE — ANESTHESIA CARE TRANSFER NOTE
Patient: Mya Hui    Procedure(s):  INJECT JOINT SACROILIAC- LEFT    Diagnosis: SI JOINT PAIN  Diagnosis Additional Information: No value filed.    Anesthesia Type:   MAC     Note:  Airway :Room Air  Patient transferred to:Phase II  Handoff Report: Identifed the Patient, Identified the Reponsible Provider, Reviewed the pertinent medical history, Discussed the surgical course, Reviewed Intra-OP anesthesia mangement and issues during anesthesia, Set expectations for post-procedure period and Allowed opportunity for questions and acknowledgement of understanding      Vitals: (Last set prior to Anesthesia Care Transfer)    CRNA VITALS  4/11/2019 1350 - 4/11/2019 1426      4/11/2019             Pulse:  69    SpO2:  92 %    Resp Rate (observed):  21                Electronically Signed By: MAYI Butler CRNA  April 11, 2019  2:26 PM

## 2019-04-11 NOTE — ANESTHESIA PREPROCEDURE EVALUATION
Anesthesia Pre-Procedure Evaluation    Patient: Mya Hui   MRN: [id number] : 1940          Preoperative Diagnosis: SI JOINT PAIN    Procedure(s):  INJECT JOINT SACROILIAC- LEFT    Past Medical History:   Diagnosis Date     Acute cholecystitis 3/23/2018     Acute kidney injury (H) 9/10/2017     Arthritis      Dehydration 2017     Diabetes type 2, controlled (H)      Elevated lactic acid level 9/10/2017     GERD (gastroesophageal reflux disease)      History of renal calculi      HTN, goal below 140/90      Hyperlipidaemia LDL goal < 100      Hypokalemia 9/3/2017     Hypothyroid      Insomnia      Left carotid stenosis      Migraine      Motion sickness      PONV (postoperative nausea and vomiting)      Sciatica of right side 2013     Past Surgical History:   Procedure Laterality Date     BUNIONECTOMY      Right foot     CATARACT IOL, RT/LT Bilateral 2017     COLONOSCOPY  2013    Procedure: COLONOSCOPY;  Colonoscopy;  Surgeon: Giovany Espinoza MD;  Location: [facility]     COLONOSCOPY N/A 2019    Procedure: Combined Colonoscopy, Single Or Multiple Biopsy/Polypectomy By Biopsy;  Surgeon: Efren Osorio MD;  Location: [facility]     COLONOSCOPY WITH CO2 INSUFFLATION N/A 2019    Procedure: COLONOSCOPY WITH CO2 INSUFFLATION;  Surgeon: Efren Osorio MD;  Location: [facility]     FRACTURE TX, ANKLE RT/LT      Left ankle     HC CYSTOURETHROSCOPY W/ URETEROSCOPY &/OR PYELOSCOPY; W/ LITHOTRIPSY       HC REVISE MEDIAN N/CARPAL TUNNEL SURG      Right wrist     INJECT EPIDURAL LUMBAR Right 2018    Procedure: INJECT EPIDURAL LUMBAR right foraminal narrowing severe at L4-L5 and moderate at L5-S1;  Surgeon: Gilbert Mcclure MD;  Location: [facility]     INJECT JOINT SACROILIAC  4/10/2014    Procedure: INJECT JOINT SACROILIAC;  Sacroiliac Joint Injection;  Surgeon: Gilbert Mcclure MD;  Location[facility]     LAPAROSCOPIC CHOLECYSTECTOMY N/A 3/24/2018    Procedure: LAPAROSCOPIC  CHOLECYSTECTOMY;  Laparoscopic Cholecystectomy;  Surgeon: Berry Allen DO;  Location: [facility]       Anesthesia Evaluation     . Pt has had prior anesthetic. Type: General and MAC    History of anesthetic complications   - PONV        ROS/MED HX    ENT/Pulmonary:  - neg pulmonary ROS     Neurologic:  - neg neurologic ROS     Cardiovascular:     (+) Dyslipidemia, hypertension-range: < 140 / 90, ---. : . . . :. . Previous cardiac testing date:results:date: results:ECG reviewed date:3-23-18 results:Sinus Bradycardia   - Nonspecific T-abnormality. date: results:          METS/Exercise Tolerance:     Hematologic:     (+) Other Hematologic Disorder-Left carotid stenosis (asymptomatic)      Musculoskeletal:   (+)  other musculoskeletal- Right sided sciatica      GI/Hepatic:     (+) GERD Asymptomatic on medication,       Renal/Genitourinary:     (+) Nephrolithiasis ,       Endo:     (+) type II DM Last HgA1c: 5.7 date: 3/8/19 Not using insulin - not using insulin pump Normal glucose range: 110-130 not previously admitted for DM/DKA Diabetic complications: neuropathy, thyroid problem hypothyroidism, .      Psychiatric:  - neg psychiatric ROS       Infectious Disease:   (+) MRSA,       Malignancy:         Other:    (+) No chance of pregnancy C-spine cleared: N/A, H/O Chronic Pain,no other significant disability                         Physical Exam  Normal systems: cardiovascular, pulmonary and dental    Airway   Mallampati: II  TM distance: >3 FB  Neck ROM: full    Dental     Cardiovascular   Rhythm and rate: regular and normal      Pulmonary    breath sounds clear to auscultation            Lab Results   Component Value Date    WBC 8.1 03/23/2018    HGB 13.3 03/23/2018    HCT 41.0 03/23/2018     03/23/2018    CRP <2.9 11/27/2018    SED 18 09/18/2017     03/08/2019    POTASSIUM 4.2 03/08/2019    CHLORIDE 110 (H) 03/08/2019    CO2 27 03/08/2019    BUN 11 03/08/2019    CR 1.07 (H) 03/08/2019      "(H) 03/08/2019    TERESA 9.2 03/08/2019    PHOS 3.3 09/03/2017    MAG 2.2 09/05/2017    ALBUMIN 3.9 03/08/2019    PROTTOTAL 7.2 03/08/2019    ALT 27 03/08/2019    AST 21 03/08/2019    ALKPHOS 53 03/08/2019    BILITOTAL 0.5 03/08/2019    LIPASE 124 03/23/2018    TSH 4.77 (H) 03/08/2019    T4 0.95 03/08/2019       Preop Vitals  BP Readings from Last 3 Encounters:   04/02/19 (P) 152/78   03/22/19 (!) 168/98   03/19/19 (!) 187/98    Pulse Readings from Last 3 Encounters:   04/02/19 (P) 75   03/22/19 80   03/19/19 72      Resp Readings from Last 3 Encounters:   04/02/19 (P) 22   03/22/19 22   03/19/19 17    SpO2 Readings from Last 3 Encounters:   04/02/19 (P) 96%   03/22/19 94%   03/19/19 94%      Temp Readings from Last 1 Encounters:   04/02/19 (P) 97.1  F (36.2  C) ((P) Temporal)    Ht Readings from Last 1 Encounters:   03/19/19 1.651 m (5' 5\")      Wt Readings from Last 1 Encounters:   04/02/19 (P) 87.2 kg (192 lb 3.2 oz)    Estimated body mass index is 31.98 kg/m  (pended) as calculated from the following:    Height as of 3/19/19: 1.651 m (5' 5\").    Weight as of 4/2/19: (P) 87.2 kg (192 lb 3.2 oz).       Anesthesia Plan      History & Physical Review  History and physical reviewed and following examination; no interval change.    ASA Status:  3 .    NPO Status:  > 8 hours    Plan for MAC Maintenance will be TIVA.           Postoperative Care      Consents  Anesthetic plan, risks, benefits and alternatives discussed with:  Patient.  Use of blood products discussed: No .   .                 MAYI Butler CRNA  "

## 2019-04-11 NOTE — OP NOTE
CHIEF COMPLAINT:    1.SI joint dysfunction (Sacroilitis) and pain (724.6)   2 Sacral enthesopathy (720.1)    PROCEDURE:   Fluoroscopically-guided injection of the Left  sacroiliac joint with Left ramona Sacroiliac joint ligaments infiltration over the sacrum.    PROCEDURE DETAILS: After written informed consent was obtained from the patient, the patient was escorted to the procedure room.  The patient was placed in the prone position.   A time out was conducted to verify patient identity, procedure to be performed, side, site, allergies and any special requirements.  The skin over the lumbosacral region was prepped and draped in normal sterile fashion. Fluoroscopy was used to identify the posteroinferior region of the sacroiliac joint.  The skin was anesthetized with 2 mL of 1% lidocaine with bicarbonate buffer.  Using fluoroscopic guidance, a 22 gauge, 3.5  Quincke spinal needle was advanced into the joint from an inferior approach.  After negative aspiration, 0.5 ml of Omnipaque contrast was injected showing intra-articular spread of contrast without evidence of intravascular spread.  2.0 mL of solution consisting of 40 mg of Depo-Medrol and 1.5 cc of 0.5% marcaine was injected slowly into the joint.   A second injection at the sacroiliac joint ligaments was then performed.  The middle third of the SI Joint was identified with fluoroscopy. After advancing a 22 gauge, 3.5  Quincke spinal needle to these ligaments with intermittent fluoroscopic guidance,  3 mL of solution consisting of  20 mg of DepoMedrol and 2.75 mL of 0.5 Marcaine was injected periligamentous and intramuscular over the sacroiliac joint ligaments.    The patient was monitored with blood pressure and pulse oximetry machines with the assistance of an RN throughout the procedure.  The patient was alert and responsive to questions throughout the procedure.   The patient tolerated the procedure well and was observed in the post-procedural area.  The  patient was dismissed without apparent complications.     DIAGNOSIS:  1.  Left sacroilitis  2.  Left sacral enthesopathy  3.  Calcified fibroid appearing on fluoroscopic images  PLAN:  1. Performed a Left  Sacroiliac joint injection.  2. Left SI ligament injections over the sacrum       Gilbert Mcclure MD  Diplomate of the American Board of Anesthesiology, Pain Medicine

## 2019-04-11 NOTE — DISCHARGE INSTRUCTIONS
Home Care Instructions                Procedure:  Epidural Steroid Injection or Joint injection    Activity:    Rest today    Do not work today    Resume normal activity tomorrow    Pain:    You may experience soreness at the injection site for one or two days    You may use an ice pack for 20 minutes every 2 hours for the first 24 hours    You may use a heating pad after the first 24 hours    You may use Tylenol  (acetaminophen) every 4 hours or other pain medicines as directed by your physician    Safety  Sedation medicine, if given may remain active for many hours.    It is important for the next 24 hours that you do not:    Drive a car    Operate machines or power tools    Consume alcohol, including beer    Sign any important papers or legal documents    You may experience numbness radiating into your legs or arms, (depending on the procedure location)  This numbness may last several hours.  Until the numb sensation returns to normal please use caution in walking, climbing stairs, stepping out of your vehicle, etc.    Common side effects of steroids:  Not everyone will experience corticosteroid side effects. If side effects are experienced they will gradually subside in the 7-10 day period following an injection.    Most common side effects include:    Flushed face and/or chest    Feeling of warmth, particularly in face but could be overall feeling of warmth    Increased blood sugar in diabetic patients    Menstrual irregularities may occur.  If taking hormone based birth control an alternate method of birth control is recommended    Sleep disturbances and/or mood swings are possible    Leg cramps    Please contact us if you have:  Severe pain   Fever more than 101.5 degrees Fahrenheit  Signs of infection (redness, swelling or drainage)      If you have questions during normal business hours (8am-5pm Monday-Friday) contact the [facility] at [phone]. If you need help after hours, we recommend that  you go to a hospital emergency room or dial 911.

## 2019-04-11 NOTE — ANESTHESIA POSTPROCEDURE EVALUATION
Patient: Mya Hui    Procedure(s):  INJECT JOINT SACROILIAC- LEFT    Diagnosis:SI JOINT PAIN  Diagnosis Additional Information: No value filed.    Anesthesia Type:  MAC    Note:  Anesthesia Post Evaluation    Patient location during evaluation: Phase 2 and Bedside  Patient participation: Able to fully participate in evaluation  Level of consciousness: awake  Pain management: adequate  Airway patency: patent  Cardiovascular status: acceptable and hemodynamically stable  Respiratory status: acceptable, room air and nonlabored ventilation  Hydration status: stable  PONV: none     Anesthetic complications: None    Comments: Patient was happy with the anesthesia care received and no anesthesia related complications were noted.  I will follow up with the patient again if it is needed.        Last vitals:  Vitals:    04/11/19 1343   BP: 152/85   Pulse: 61   Resp: 20   Temp: 98  F (36.7  C)   SpO2: 95%         Electronically Signed By: MAYI Butler CRNA  April 11, 2019  2:27 PM

## 2019-04-11 NOTE — ANESTHESIA CARE TRANSFER NOTE
Patient: Mya Hui    Procedure(s):  INJECT JOINT SACROILIAC- LEFT    Diagnosis: SI JOINT PAIN  Diagnosis Additional Information: No value filed.    Anesthesia Type:   MAC     Note:  Airway :Room Air  Patient transferred to:Phase II  Handoff Report: Identifed the Patient, Identified the Reponsible Provider, Reviewed the pertinent medical history, Discussed the surgical course, Reviewed Intra-OP anesthesia mangement and issues during anesthesia, Set expectations for post-procedure period and Allowed opportunity for questions and acknowledgement of understanding      Vitals: (Last set prior to Anesthesia Care Transfer)    CRNA VITALS  4/11/2019 1350 - 4/11/2019 1427      4/11/2019             Pulse:  69    SpO2:  92 %    Resp Rate (observed):  21                Electronically Signed By: MAYI Butler CRNA  April 11, 2019  2:27 PM

## 2019-04-15 DIAGNOSIS — I10 ESSENTIAL HYPERTENSION WITH GOAL BLOOD PRESSURE LESS THAN 140/90: ICD-10-CM

## 2019-04-15 RX ORDER — DILTIAZEM HYDROCHLORIDE 120 MG/1
120 CAPSULE, EXTENDED RELEASE ORAL
Qty: 90 CAPSULE | Refills: 0 | Status: SHIPPED | OUTPATIENT
Start: 2019-04-15 | End: 2019-05-22

## 2019-04-15 NOTE — TELEPHONE ENCOUNTER
Reason for Call:  Medication or medication refill:    Do you use a [facility]?  Name of the pharmacy and phone number for the current request:  [facility] in [place]    Name of the medication requested:diltiazem ER (TIAZAC) 120 MG 24 hr ER beaded capsule     Other request: Patient needs this refilled today, she is out of them. Please advise.     Can we leave a detailed message on this number? YES    Phone number patient can be reached at: Home number on file [phone] (home)    Best Time: any    Call taken on 4/15/2019 at 10:53 AM by Gay Kaufman

## 2019-04-15 NOTE — TELEPHONE ENCOUNTER
"Routing to covering provider to review. PCP out of clinic.  Routing refill request to provider for review/approval because:  Labs out of range:  Cr  Bp is > goal range       diltiazem  Last Written Prescription Date:  3/22/2019  Last Fill Quantity: 90,  # refills: 0   Last office visit: 4/2/2019 with prescribing provider:  rufino   Future Office Visit:   Next 5 appointments (look out 90 days)    May 28, 2019  3:00 PM CDT  Return Visit with Buddy Ott PA-C  [facility] [id number] [place] [id number] [phone]           Requested Prescriptions   Pending Prescriptions Disp Refills     diltiazem ER (TIAZAC) 120 MG 24 hr ER beaded capsule 90 capsule 0     Sig: Take 1 capsule (120 mg) by mouth 2 times daily       Calcium Channel Blockers Protocol  Failed - 4/15/2019 10:55 AM        Failed - Blood pressure under 140/90 in past 12 months     BP Readings from Last 3 Encounters:   04/11/19 152/85   04/02/19 (P) 152/78   03/22/19 (!) 168/98                 Failed - Normal serum creatinine on file in past 12 months     Recent Labs   Lab Test 03/08/19  0927   CR 1.07*             Passed - Normal ALT in past 12 months     Recent Labs   Lab Test 03/08/19  0927   ALT 27             Passed - Recent (12 mo) or future (30 days) visit within the authorizing provider's specialty     Patient had office visit in the last 12 months or has a visit in the next 30 days with authorizing provider or within the authorizing provider's specialty.  See \"Patient Info\" tab in inbasket, or \"Choose Columns\" in Meds & Orders section of the refill encounter.              Passed - Medication is active on med list        Passed - Patient is age 18 or older        Passed - No active pregnancy on record        Passed - No positive pregnancy test in past 12 months          Sarah Portillo RN on 4/15/2019 at 10:57 AM    "

## 2019-04-24 ENCOUNTER — TRANSFERRED RECORDS (OUTPATIENT)
Dept: HEALTH INFORMATION MANAGEMENT | Facility: CLINIC | Age: 79
End: 2019-04-24

## 2019-05-06 ENCOUNTER — TRANSFERRED RECORDS (OUTPATIENT)
Dept: HEALTH INFORMATION MANAGEMENT | Facility: CLINIC | Age: 79
End: 2019-05-06

## 2019-05-09 ENCOUNTER — TELEPHONE (OUTPATIENT)
Dept: FAMILY MEDICINE | Facility: CLINIC | Age: 79
End: 2019-05-09

## 2019-05-09 NOTE — TELEPHONE ENCOUNTER
Attempted outreach to patient: Left message    Patient is due for:  BP recheck    If patient had this completed elsewhere, please obtain approximate date, clinic/hospital where test was done and the result (abnormal/normal).    Please assist in scheduling if not completed.      Panel Management Review      Patient has the following on her problem list:     Diabetes    ASA: not listed    Last A1C  Lab Results   Component Value Date    A1C 5.7 03/08/2019    A1C 5.7 10/10/2018    A1C 6.2 04/03/2018    A1C 6.1 09/04/2017    A1C 5.9 08/10/2017     A1C tested: Passed    Last LDL:    Lab Results   Component Value Date    CHOL 117 03/08/2019     Lab Results   Component Value Date    HDL 46 03/08/2019     Lab Results   Component Value Date    LDL 38 03/08/2019     Lab Results   Component Value Date    TRIG 165 03/08/2019     Lab Results   Component Value Date    CHOLHDLRATIO 3.1 05/22/2015     Lab Results   Component Value Date    NHDL 71 03/08/2019       Is the patient on a Statin? YES             Is the patient on Aspirin? NO    Medications     HMG CoA Reductase Inhibitors     pravastatin (PRAVACHOL) 40 MG tablet             Last three blood pressure readings:  BP Readings from Last 3 Encounters:   04/11/19 152/85   04/02/19 (P) 152/78   03/22/19 (!) 168/98       Date of last diabetes office visit: 3/2019     Tobacco History:     History   Smoking Status     Never Smoker   Smokeless Tobacco     Never Used         Hypertension   Last three blood pressure readings:  BP Readings from Last 3 Encounters:   04/11/19 152/85   04/02/19 (P) 152/78   03/22/19 (!) 168/98     Blood pressure: FAILED    HTN Guidelines:  Less than 140/90      Composite cancer screening  Chart review shows that this patient is due/due soon for the following None  Summary:    Patient is due/failing the following:   BP CHECK    Action needed:   Patient needs nurse only appointment.    Type of outreach:    Phone, left message for patient to call back.      Questions for provider review:    None                                                                                                                                    ACase/MA       Chart routed to  .

## 2019-05-22 DIAGNOSIS — I10 ESSENTIAL HYPERTENSION WITH GOAL BLOOD PRESSURE LESS THAN 140/90: ICD-10-CM

## 2019-05-23 RX ORDER — DILTIAZEM HYDROCHLORIDE 120 MG/1
CAPSULE, COATED, EXTENDED RELEASE ORAL
Qty: 90 CAPSULE | Refills: 0 | Status: SHIPPED | OUTPATIENT
Start: 2019-05-23 | End: 2019-07-05

## 2019-05-23 NOTE — TELEPHONE ENCOUNTER
"Requested Prescriptions   Pending Prescriptions Disp Refills     diltiazem ER COATED BEADS (CARDIZEM CD/CARTIA XT) 120 MG 24 hr capsule [Pharmacy Med Name: DILTIAZEM HCL ER COATED  CP24] 90 capsule 0     Sig: TAKE ONE CAPSULE BY MOUTH TWICE A DAY   Last Written Prescription Date:  4/15/2019  Last Fill Quantity: 90,  # refills: 0   Last office visit: 4/2/2019 with prescribing provider:     Future Office Visit:   Next 5 appointments (look out 90 days)    May 28, 2019  3:00 PM CDT  [facility] Visit with Buddy Ott [facility]-C  [facility] [id number] [place] [id number] [phone]             Calcium Channel Blockers Protocol  Failed - 5/22/2019 11:23 AM        Failed - Blood pressure under 140/90 in past 12 months     BP Readings from Last 3 Encounters:   04/11/19 152/85   04/02/19 (P) 152/78   03/22/19 (!) 168/98           Failed - Medication is active on med list        Failed - Normal serum creatinine on file in past 12 months     Recent Labs   Lab Test 03/08/19  0927   CR 1.07*           Passed - Normal ALT in past 12 months     Recent Labs   Lab Test 03/08/19  0927   ALT 27           Passed - Recent (12 mo) or future (30 days) visit within the authorizing provider's specialty     Patient had office visit in the last 12 months or has a visit in the next 30 days with authorizing provider or within the authorizing provider's specialty.  See \"Patient Info\" tab in inbasket, or \"Choose Columns\" in Meds & Orders section of the refill encounter.              Passed - Patient is age 18 or older        Passed - No active pregnancy on record        Passed - No positive pregnancy test in past 12 months      Routing refill request to provider for review/approval because:  Labs out of range:  CR    Julieta Mckinley RN            "

## 2019-06-13 ENCOUNTER — OFFICE VISIT (OUTPATIENT)
Dept: ORTHOPEDICS | Facility: OTHER | Age: 79
End: 2019-06-13
Payer: COMMERCIAL

## 2019-06-13 VITALS
SYSTOLIC BLOOD PRESSURE: 125 MMHG | BODY MASS INDEX: 31.65 KG/M2 | HEIGHT: 65 IN | OXYGEN SATURATION: 92 % | RESPIRATION RATE: 13 BRPM | DIASTOLIC BLOOD PRESSURE: 77 MMHG | WEIGHT: 190 LBS | HEART RATE: 62 BPM

## 2019-06-13 DIAGNOSIS — M12.811 ROTATOR CUFF ARTHROPATHY, RIGHT: Primary | ICD-10-CM

## 2019-06-13 PROCEDURE — 20610 DRAIN/INJ JOINT/BURSA W/O US: CPT | Mod: RT | Performed by: ORTHOPAEDIC SURGERY

## 2019-06-13 RX ORDER — TRIAMCINOLONE ACETONIDE 40 MG/ML
80 INJECTION, SUSPENSION INTRA-ARTICULAR; INTRAMUSCULAR ONCE
Status: COMPLETED | OUTPATIENT
Start: 2019-06-13 | End: 2019-06-13

## 2019-06-13 RX ADMIN — TRIAMCINOLONE ACETONIDE 80 MG: 40 INJECTION, SUSPENSION INTRA-ARTICULAR; INTRAMUSCULAR at 09:07

## 2019-06-13 ASSESSMENT — MIFFLIN-ST. JEOR: SCORE: 1347.71

## 2019-06-13 NOTE — PROGRESS NOTES
Follow up right shoulder cuff tear arthropathy .  She has had steroid injections 10/17/18 and 3/8/19.  The last one did not work well.  She desires injection again.  MRI in 2017 showed a small full thickness rotator cuff tear.  Conservative treatment was elected.  She does not have severe osteoarthritis, and is not ready for total shoulder arthroplasty.  She does have full range of motion.  Positive pain with resisted external rotation and abduction, no pain with resisted internal rotation.    Patient desires injection today of right shoulder.  Risks, benefits, potential complications and alternatives were discussed.   With the patient's consent, sterile prep was performed of right shoulder.  Right shoulder was injected with Kenalog 80 mg and lidocaine at shoulder subacromial space from the posterolateral approach .  Prior to injection, verified patient identity using patient's name and date of birth.  Due to injection administration, patient instructed to remain in clinic for 15 minutes  afterwards, and to report any adverse reaction to me immediately.    Joint injection was performed.      Drug Amount Wasted:  None.  Vial/Syringe: Single dose vial  Expiration Date:  01/2021    Return to clinic as needed.

## 2019-06-13 NOTE — LETTER
6/13/2019         RE: Mya Hui  [address]  [facility] [address]        Dear Colleague,    Thank you for referring your patient, Mya Hui, to the [facility]. Please see a copy of my visit note below.    Follow up right shoulder cuff tear arthropathy .  She has had steroid injections 10/17/18 and 3/8/19.  The last one did not work well.  She desires injection again.  MRI in 2017 showed a small full thickness rotator cuff tear.  Conservative treatment was elected.  She does not have severe osteoarthritis, and is not ready for total shoulder arthroplasty.  She does have full range of motion.  Positive pain with resisted external rotation and abduction, no pain with resisted internal rotation.    Patient desires injection today of right shoulder.  Risks, benefits, potential complications and alternatives were discussed.   With the patient's consent, sterile prep was performed of right shoulder.  Right shoulder was injected with Kenalog 80 mg and lidocaine at shoulder subacromial space from the posterolateral approach .  Prior to injection, verified patient identity using patient's name and date of birth.  Due to injection administration, patient instructed to remain in clinic for 15 minutes  afterwards, and to report any adverse reaction to me immediately.    Joint injection was performed.      Drug Amount Wasted:  None.  Vial/Syringe: Single dose vial  Expiration Date:  01/2021    Return to clinic as needed.        Again, thank you for allowing me to participate in the care of your patient.        Sincerely,        Naman Choe MD

## 2019-07-02 DIAGNOSIS — E11.9 TYPE 2 DIABETES MELLITUS WITHOUT COMPLICATION, WITHOUT LONG-TERM CURRENT USE OF INSULIN (H): ICD-10-CM

## 2019-07-03 ENCOUNTER — TELEPHONE (OUTPATIENT)
Dept: FAMILY MEDICINE | Facility: CLINIC | Age: 79
End: 2019-07-03

## 2019-07-03 RX ORDER — METFORMIN HCL 500 MG
TABLET, EXTENDED RELEASE 24 HR ORAL
Qty: 270 TABLET | Refills: 1 | Status: SHIPPED | OUTPATIENT
Start: 2019-07-03 | End: 2019-07-03 | Stop reason: SINTOL

## 2019-07-03 NOTE — TELEPHONE ENCOUNTER
I have contacted the pt in regards to her Metformin as the pharmacy is questioning a dose change. Pt states she is no longer taking the Metformin. I have notified the pharmacy of this message. Med list updated. Sejal Knutson CMA ([facility])

## 2019-07-03 NOTE — TELEPHONE ENCOUNTER
metformin  Last Written Prescription Date:  1/1/2019  Last Fill Quantity: 90,  # refills: 1   Last office visit: 4/2/2019 with prescribing provider:      Future Office Visit:      Requested Prescriptions   Pending Prescriptions Disp Refills     metFORMIN (GLUCOPHAGE-XR) 500 MG 24 hr tablet [Pharmacy Med Name: METFORMIN HCL ER 500MG TB24] 270 tablet 1     Sig: TAKE ONE TABLET BY MOUTH EVERY MORNING AND TAKE TWO TABLETS BY MOUTH EVERY EVENING WITH MEALS       Biguanide Agents Failed - 7/2/2019  2:04 PM        Failed - Medication is active on med list        Passed - Blood pressure less than 140/90 in past 6 months     BP Readings from Last 3 Encounters:   06/13/19 125/77   04/11/19 152/85   04/02/19 (P) 152/78           Passed - Patient has documented LDL within the past 12 mos.     Recent Labs   Lab Test 03/08/19 0927   LDL 38           Passed - Patient has had a Microalbumin in the past 15 mos.     Recent Labs   Lab Test 10/10/18  0805   MICROL 31   UMALCR 10.23           Passed - Patient is age 10 or older        Passed - Patient has documented A1c within the specified period of time.     If HgbA1C is 8 or greater, it needs to be on file within the past 3 months.  If less than 8, must be on file within the past 6 months.     Recent Labs   Lab Test 03/08/19 0927   A1C 5.7*             Passed - Patient's CR is NOT>1.4 OR Patient's EGFR is NOT<45 within past 12 mos.     Recent Labs   Lab Test 03/08/19 0927   GFRESTIMATED 50*   GFRESTBLACK 58*       Recent Labs   Lab Test 03/08/19 0927   CR 1.07*             Passed - Patient does NOT have a diagnosis of CHF.        Passed - Patient is not pregnant        Passed - Patient has not had a positive pregnancy test within the past 12 mos.         Passed - Recent (6 mo) or future (30 days) visit within the authorizing provider's specialty     Patient had office visit in the last 6 months or has a visit in the next 30 days with authorizing provider or within the authorizing  "provider's specialty.  See \"Patient Info\" tab in inbasket, or \"Choose Columns\" in Meds & Orders section of the refill encounter.              Routing refill request to provider for review/approval because:  Drug not active on patient's medication list. Had dc'd at last office visit on 3/14/2019     Note from Chanell : \"Mya is accompanied to clinic today by her daughter to review her medications.  Her metformin had been progressively reduced from 3 times daily down to 1 time daily.  Her hemoglobin A1c was good at 5.7.  We discussed trying just dietary management, hoping that being off the metformin would help with her chronic diarrhea\"      Dorina Johns RN on 7/3/2019 at 11:55 AM    "

## 2019-07-06 DIAGNOSIS — I10 ESSENTIAL HYPERTENSION WITH GOAL BLOOD PRESSURE LESS THAN 140/90: ICD-10-CM

## 2019-07-08 RX ORDER — DILTIAZEM HYDROCHLORIDE 120 MG/1
CAPSULE, EXTENDED RELEASE ORAL
Qty: 90 CAPSULE | Refills: 0 | OUTPATIENT
Start: 2019-07-08

## 2019-07-08 NOTE — TELEPHONE ENCOUNTER
"cartia  Last Written Prescription Date:  7/5/2019  Last Fill Quantity: 180,  # refills: 2   Last office visit: 4/2/2019 with prescribing provider:      Future Office Visit:      Requested Prescriptions   Pending Prescriptions Disp Refills     CARTIA  MG 24 hr capsule [Pharmacy Med Name: CARTIA XT 120MG CP24] 90 capsule 0     Sig: TAKE ONE CAPSULE BY MOUTH TWICE DAILY       Calcium Channel Blockers Protocol  Failed - 7/6/2019  1:03 AM        Failed - Normal serum creatinine on file in past 12 months     Recent Labs   Lab Test 03/08/19  0927   CR 1.07*             Passed - Blood pressure under 140/90 in past 12 months     BP Readings from Last 3 Encounters:   06/13/19 125/77   04/11/19 152/85   04/02/19 (P) 152/78                 Passed - Normal ALT in past 12 months     Recent Labs   Lab Test 03/08/19  0927   ALT 27             Passed - Recent (12 mo) or future (30 days) visit within the authorizing provider's specialty     Patient had office visit in the last 12 months or has a visit in the next 30 days with authorizing provider or within the authorizing provider's specialty.  See \"Patient Info\" tab in inbasket, or \"Choose Columns\" in Meds & Orders section of the refill encounter.              Passed - Medication is active on med list        Passed - Patient is age 18 or older        Passed - No active pregnancy on record        Passed - No positive pregnancy test in past 12 months          MAT-belem Johns RN on 7/8/2019 at 12:09 PM    "

## 2019-07-15 NOTE — PROGRESS NOTES
"Subjective     Mya Hui is a 79 year old female who presents to clinic today for the following health issues:    HPI   Concern - Ear Pain  Onset: less then 6 month    Description:   wax build up causes pain    Intensity: moderate    Progression of Symptoms:  worsening    Accompanying Signs & Symptoms:  none    Previous history of similar problem:   none    Precipitating factors:   Worsened by: none    Alleviating factors:  Improved by: none  Therapies Tried and outcome: Q-Tips- not helping    - Feels like popping at night (right ear)       Neck Pain  Onset: 1 month     Description:   Location: right sided   Radiation: ear as well    Intensity: 0/10- at night when laying down 5/10    Progression of Symptoms:  worsening    Accompanying Signs & Symptoms:  Burning, prickly sensation (paresthesias) in arm(s): no  Numbness in arm(s): no   Weakness in arm(s):  no   Fever: no   Headache: no   Nausea and/or vomiting: no     History:   Trauma: no   Previous neck pain: no   Previous surgery or injections: no   Previous Imaging (MRI,X ray): no     Precipitating factors:   Does movement increase the pain:  no     Alleviating factors:  none  Therapies Tried and outcome:  none    - Ear started same time   - Certain times, hurts if moves wrong, more so at night   - One spot on left side       - Dr. Burnham in [place]      Medication very expensive, ran out for 2 weeks      On Oxybutynin & Merbetriq (this being the expensive one)       Helping but not as much as wishes it could, has to change pad once a night      No control at all       Reviewed and updated as needed this visit by Provider  Allergies  Meds  Problems  Med Hx  Surg Hx         Review of Systems   ROS COMP: Constitutional, HEENT, cardiovascular, pulmonary, gi and gu systems are negative, except as otherwise noted.      Objective    /88   Pulse 70   Temp 97.9  F (36.6  C) (Temporal)   Resp 16   Ht 1.651 m (5' 5\")   Wt 88.2 kg (194 lb 6.4 oz)  "  SpO2 96%   BMI 32.35 kg/m    Body mass index is 32.35 kg/m .  Physical Exam   GENERAL APPEARANCE: healthy appearing, alert and no distress  EYES: Eyes grossly normal to inspection, PERRLA, conjunctivae and sclerae without injection or discharge, EOM intact   HENT: Bilateral ear canals without erythema or cerumen, bilateral TM's pearly grey with normal light reflex, small right clear effusion, with no injection or bulging, left with no effusion, injection, or bulging, nasal turbinates without swelling, erythema, or discharge, mouth without ulcers or lesions, oropharynx clear and oral mucous membranes moist, no sinus tenderness   NECK: No adenopathy in cervical or supraclavicular regions, no masses, trachea midline, no midline tenderness, left paracervical tenderness with palpable spasm, normal ROM but painful with some movements   MS: No musculoskeletal defects are noted and gait is age appropriate without ataxia   SKIN: No suspicious lesions or rashes, hydration status appears adeuqate with normal skin turgor   PSYCH: Alert and oriented x3; speech- coherent , normal rate and volume; able to articulate logical thoughts, able to abstract reason, no tangential thoughts, no hallucinations or delusions, mentation appears normal, Mood is euthymic. Affect is appropriate for this mood state and bright. Thought content is free of suicidal ideation, hallucinations, and delusions. Dress is adequate and upkept. Eye contact is good during conversation.       Diagnostic Test Results:  none         Assessment & Plan       ICD-10-CM    1. Right acute serous otitis media, recurrence not specified H65.01    2. Neck muscle spasm M62.838    3. Urinary incontinence, unspecified type R32 UROLOGY ADULT REFERRAL     1.   - Discussed findings of small fluid behind TM on right ear   - Likely cause of popping, no cerumen as patient expected   - Due to age, will recommend OTC antihistamine   - Loratadine (Claritin) 10 mg - once daily in the  morning for 2 weeks      Should help with draining behind ear   - Return to clinic if doesn't     2. Neck spasm   - Discussed exam findings with patient   - Recommend conservative treatment   - Neck stretches 3-4x/day      Range of motion (6 movements count to 20 seconds)           Up, Down, look over each shoulder, ear to each shoulder          Second time, add resistance/pressure with your had   - Massage it couple times a day (tight knot area)   - Heat compress - 15 minutes 2-3x/day best after stretch   - Return to clinic in 1 month if doesn't improve      3. Urinary incontinence   - Doing ok on regimen Oxybutynin & Merbetriq but wishes could be better and Merbetriq is very expensive, also doesn't like going to [place]   - Will refer to urology here to see if anything further we can do for patient       The patient indicates understanding of these issues and agrees with the plan.    Return in about 2 weeks (around 7/31/2019) for if not improving.    Kisha Stinson PA-C  [facility]

## 2019-07-17 ENCOUNTER — OFFICE VISIT (OUTPATIENT)
Dept: FAMILY MEDICINE | Facility: OTHER | Age: 79
End: 2019-07-17
Payer: COMMERCIAL

## 2019-07-17 VITALS
SYSTOLIC BLOOD PRESSURE: 144 MMHG | OXYGEN SATURATION: 96 % | DIASTOLIC BLOOD PRESSURE: 88 MMHG | WEIGHT: 194.4 LBS | TEMPERATURE: 97.9 F | BODY MASS INDEX: 32.39 KG/M2 | HEIGHT: 65 IN | HEART RATE: 70 BPM | RESPIRATION RATE: 16 BRPM

## 2019-07-17 DIAGNOSIS — M62.838 NECK MUSCLE SPASM: ICD-10-CM

## 2019-07-17 DIAGNOSIS — H65.01 RIGHT ACUTE SEROUS OTITIS MEDIA, RECURRENCE NOT SPECIFIED: Primary | ICD-10-CM

## 2019-07-17 DIAGNOSIS — R32 URINARY INCONTINENCE, UNSPECIFIED TYPE: ICD-10-CM

## 2019-07-17 PROCEDURE — 99207 C PAF COMPLETED  NO CHARGE: CPT | Performed by: PHYSICIAN ASSISTANT

## 2019-07-17 PROCEDURE — 99214 OFFICE O/P EST MOD 30 MIN: CPT | Performed by: PHYSICIAN ASSISTANT

## 2019-07-17 RX ORDER — OXYBUTYNIN CHLORIDE 10 MG/1
10 TABLET, EXTENDED RELEASE ORAL DAILY
Refills: 2 | COMMUNITY
Start: 2019-07-10 | End: 2019-07-18

## 2019-07-17 ASSESSMENT — MIFFLIN-ST. JEOR: SCORE: 1367.67

## 2019-07-17 ASSESSMENT — PAIN SCALES - GENERAL: PAINLEVEL: MODERATE PAIN (5)

## 2019-07-17 NOTE — PATIENT INSTRUCTIONS
- Loratadine (Claritin) 10 mg - once daily in the morning for 2 weeks      Should help with draining behind ear     - Neck stretches 3-4x/day      Range of motion (6 movements count to 20 seconds)           Up, Down, look over each shoulder, ear to each shoulder          Second time, add resistance/pressure with your had   - Massage it couple times a day   - Heat compress - 15 minutes 2-3x/day best after stretch

## 2019-07-18 ENCOUNTER — OFFICE VISIT (OUTPATIENT)
Dept: UROLOGY | Facility: OTHER | Age: 79
End: 2019-07-18
Payer: COMMERCIAL

## 2019-07-18 VITALS
DIASTOLIC BLOOD PRESSURE: 82 MMHG | HEART RATE: 63 BPM | TEMPERATURE: 98.6 F | RESPIRATION RATE: 16 BRPM | SYSTOLIC BLOOD PRESSURE: 153 MMHG | OXYGEN SATURATION: 97 %

## 2019-07-18 DIAGNOSIS — I10 ESSENTIAL HYPERTENSION WITH GOAL BLOOD PRESSURE LESS THAN 140/90: ICD-10-CM

## 2019-07-18 DIAGNOSIS — N32.81 OAB (OVERACTIVE BLADDER): Primary | ICD-10-CM

## 2019-07-18 DIAGNOSIS — N39.44 NOCTURNAL ENURESIS: ICD-10-CM

## 2019-07-18 PROCEDURE — 51798 US URINE CAPACITY MEASURE: CPT | Performed by: UROLOGY

## 2019-07-18 PROCEDURE — 99203 OFFICE O/P NEW LOW 30 MIN: CPT | Mod: 25 | Performed by: UROLOGY

## 2019-07-18 RX ORDER — MIRABEGRON 50 MG/1
50 TABLET, EXTENDED RELEASE ORAL DAILY
Qty: 90 TABLET | Refills: 3 | Status: SHIPPED | OUTPATIENT
Start: 2019-07-18 | End: 2020-04-27

## 2019-07-18 RX ORDER — OXYBUTYNIN CHLORIDE 10 MG/1
10 TABLET, EXTENDED RELEASE ORAL DAILY
Qty: 90 TABLET | Refills: 3 | Status: SHIPPED | OUTPATIENT
Start: 2019-07-18 | End: 2020-08-07

## 2019-07-18 ASSESSMENT — PAIN SCALES - GENERAL: PAINLEVEL: NO PAIN (0)

## 2019-07-18 NOTE — PROGRESS NOTES
S: Patient is a pleasant 79-year-old  female who is request be seen by Kisha Orr for a consultation with regard to patient's urinary problem.  Patient has a long history of overactive bladder symptoms.  She is currently being managed by Dr. Gore.  She is here for a second opinion.  She planes of urinary frequency and also nocturia with enuresis.  She is currently being managed with oxybutynin and Myrbetriq.  During the daytime, symptoms are okay but at night she continues have incontinence.  She has no dysuria or hematuria.  She has 5 children that was delivered vaginally.  Current Outpatient Medications   Medication Sig Dispense Refill     ACCU-CHEK COMPACT PLUS test strip USE TO TEST BLOOD BLOOD SUGARS ONCE DAILY OR AS DIRECTED 102 each 3     acetaminophen (TYLENOL) 325 MG tablet Take 2 tablets (650 mg) by mouth every 4 hours as needed for mild pain 100 tablet      blood glucose (NO BRAND SPECIFIED) lancets standard Glucose test strips  Use to test blood sugar 1 times daily or as directed. 1 Box 11     blood glucose monitoring (SOFTCLIX) lancets USE TO TEST BLOOD SUGARS ONCE DAILY OR AS DIRECTED 100 each 11     CARTIA  MG 24 hr capsule TAKE ONE CAPSULE BY MOUTH TWICE DAILY 180 capsule 2     cholestyramine (QUESTRAN) 4 GM/DOSE powder Take 4 g by mouth 2 times daily (with meals) 378 g 11     levothyroxine (SYNTHROID/LEVOTHROID) 75 MCG tablet TAKE ONE TABLET BY MOUTH ONCE DAILY 60 tablet 8     lisinopril (PRINIVIL/ZESTRIL) 40 MG tablet Take 1 tablet (40 mg) by mouth daily 90 tablet 1     mirabegron (MYRBETRIQ) 50 MG 24 hr tablet Take 1 tablet (50 mg) by mouth daily 90 tablet 3     Multiple Vitamins-Minerals (MULTIVITAL PO) Take  by mouth. daily       oxybutynin ER (DITROPAN-XL) 10 MG 24 hr tablet Take 1 tablet (10 mg) by mouth daily 90 tablet 3     pantoprazole (PROTONIX) 40 MG EC tablet TAKE ONE TABLET BY MOUTH TWICE A DAY - TAKE 30 TO 60 MINUTES BEFORE A MEAL 180 tablet 3      potassium chloride ER (K-TAB/KLOR-CON) 10 MEQ CR tablet TAKE ONE TABLET BY MOUTH THREE TIMES A DAY 90 tablet 11     pravastatin (PRAVACHOL) 40 MG tablet TAKE ONE TABLET BY MOUTH ONCE DAILY 30 tablet 11     cyclobenzaprine (FLEXERIL) 10 MG tablet Take 0.5 tablets (5 mg) by mouth 3 times daily as needed for muscle spasms (Patient not taking: Reported on 7/17/2019) 15 tablet 0     Allergies   Allergen Reactions     Codeine Nausea     Fentanyl Nausea and Vomiting     VERY sensitive to most narcotics if not all.     Past Medical History:   Diagnosis Date     Acute cholecystitis 3/23/2018     Acute kidney injury (H) 9/10/2017     Arthritis      Dehydration 9/4/2017     Diabetes type 2, controlled (H)      Elevated lactic acid level 9/10/2017     GERD (gastroesophageal reflux disease)      History of renal calculi      HTN, goal below 140/90      Hyperlipidaemia LDL goal < 100      Hypokalemia 9/3/2017     Hypothyroid      Insomnia      Left carotid stenosis      Migraine      Motion sickness      PONV (postoperative nausea and vomiting)      Sciatica of right side 6/28/2013     Past Surgical History:   Procedure Laterality Date     BUNIONECTOMY      Right foot     CATARACT IOL, RT/LT Bilateral 2017     COLONOSCOPY  7/12/2013    Procedure: COLONOSCOPY;  Colonoscopy;  Surgeon: Giovany Espinoza MD;  Location: [facility]     COLONOSCOPY N/A 1/31/2019    Procedure: Combined Colonoscopy, Single Or Multiple Biopsy/Polypectomy By Biopsy;  Surgeon: Efren Osorio MD;  Location: [facility]     COLONOSCOPY WITH CO2 INSUFFLATION N/A 1/31/2019    Procedure: COLONOSCOPY WITH CO2 INSUFFLATION;  Surgeon: Efren Osorio MD;  Location: [facility]     FRACTURE TX, ANKLE RT/LT      Left ankle     HC CYSTOURETHROSCOPY W/ URETEROSCOPY &/OR PYELOSCOPY; W/ LITHOTRIPSY       HC REVISE MEDIAN N/CARPAL TUNNEL SURG      Right wrist     INJECT EPIDURAL LUMBAR Right 12/27/2018    Procedure: INJECT EPIDURAL LUMBAR right foraminal  narrowing severe at L4-L5 and moderate at L5-S1;  Surgeon: Gilbert Mcclure MD;  Location: [facility]     INJECT JOINT SACROILIAC  4/10/2014    Procedure: INJECT JOINT SACROILIAC;  Sacroiliac Joint Injection;  Surgeon: Gilbert Mcclure MD;  Location: [facility]     INJECT JOINT SACROILIAC Left 4/11/2019    Procedure: INJECT JOINT SACROILIAC- LEFT;  Surgeon: Gilbert Mcclure MD;  Location: [facility]     LAPAROSCOPIC CHOLECYSTECTOMY N/A 3/24/2018    Procedure: LAPAROSCOPIC CHOLECYSTECTOMY;  Laparoscopic Cholecystectomy;  Surgeon: Berry Allen DO;  Location: [facility]      Family History   Problem Relation Age of Onset     Hypertension Brother      Cerebrovascular Disease Brother      Diabetes Father      Cardiovascular Father      Diabetes Brother         Borderline     Breast Cancer Mother      Diabetes Brother      Blood Disease Brother      Cardiovascular Brother         MI     Social History     Socioeconomic History     Marital status:      Spouse name: None     Number of children: None     Years of education: None     Highest education level: None   Occupational History     Employer: RETIRED     Comment: [employer] office part time   Social Needs     Financial resource strain: None     Food insecurity:     Worry: None     Inability: None     Transportation needs:     Medical: None     Non-medical: None   Tobacco Use     Smoking status: Never Smoker     Smokeless tobacco: Never Used   Substance and Sexual Activity     Alcohol use: Yes     Drug use: No     Sexual activity: Not Currently   Lifestyle     Physical activity:     Days per week: None     Minutes per session: None     Stress: None   Relationships     Social connections:     Talks on phone: None     Gets together: None     Attends [employer] service: None     Active member of club or organization: None     Attends meetings of clubs or organizations: None     Relationship status: None     Intimate partner violence:     Fear of current or ex partner: None      Emotionally abused: None     Physically abused: None     Forced sexual activity: None   Other Topics Concern     Parent/sibling w/ CABG, MI or angioplasty before 65F 55M? Not Asked   Social History Narrative     None       REVIEW OF SYSTEMS  =================  C: NEGATIVE for fever, chills, change in weight  I: NEGATIVE for worrisome rashes, moles or lesions  E/M: NEGATIVE for ear, mouth and throat problems  R: NEGATIVE for significant cough or SHORTNESS OF BREATH  CV:  NEGATIVE for chest pain, palpitations or peripheral edema  GI: NEGATIVE for nausea, abdominal pain, heartburn, or change in bowel habits  NEURO: NEGATIVE numbness/weakness  : see HPI  PSYCH: NEGATIVE depression/anxiety  LYmph: no new enlarged lymph nodes  Ortho: no new trauma/movements      Physical Exam:  /82 (BP Location: Right arm, Patient Position: Sitting, Cuff Size: Adult Regular)   Pulse 63   Temp 98.6  F (37  C) (Oral)   Resp 16   SpO2 97%    Patient is pleasant, in no acute distress, good general condition.  Heart:  negative, PMI normal  Lung: no evidence of respiratory distress    Abdomen: Soft, nondistended, non tender. No masses. No rebound or guarding.   Exam: No CVA tenderness.  Bladder scan with 0 residual urine.  Skin: Warm and dry.  No redness.  Neuro: grossly normal  Musculaskeletal: moving all extremities  Psych normal mood and affect  Musculoskeletal  moving all extremities  Hematologic/Lymphatic/Immunologic: normal ant/post cervical, axillary, supraclavicular and inguinal nodes    Assessment/Plan:   79-year-old  female with overactive bladder symptoms and also enuresis.  Treatment options discussed with patient at length.  Discussed medical management, physical therapy, Botox injection, and also InterStim for overactive bladder symptoms.  Information about these procedures given to patient.  Sleep apnea is a consideration given enuresis.    HTN: Mya to follow up with Primary Care provider regarding  elevated blood pressure.

## 2019-08-02 ENCOUNTER — TELEPHONE (OUTPATIENT)
Dept: UROLOGY | Facility: CLINIC | Age: 79
End: 2019-08-02

## 2019-08-02 NOTE — TELEPHONE ENCOUNTER
Based on last prescription quantity for both Myrbetriq and Ditropan, patient should have a sufficient quantity. Called patient who will contact her pharmacy and let us know if she has issues.    Skyler Vieira RN....8/2/2019 2:35 PM

## 2019-08-12 ENCOUNTER — NURSE TRIAGE (OUTPATIENT)
Dept: FAMILY MEDICINE | Facility: OTHER | Age: 79
End: 2019-08-12

## 2019-08-12 ENCOUNTER — OFFICE VISIT (OUTPATIENT)
Dept: FAMILY MEDICINE | Facility: OTHER | Age: 79
End: 2019-08-12
Payer: COMMERCIAL

## 2019-08-12 VITALS
BODY MASS INDEX: 32.88 KG/M2 | HEART RATE: 70 BPM | SYSTOLIC BLOOD PRESSURE: 104 MMHG | RESPIRATION RATE: 24 BRPM | TEMPERATURE: 97.1 F | OXYGEN SATURATION: 95 % | DIASTOLIC BLOOD PRESSURE: 62 MMHG | WEIGHT: 197.6 LBS

## 2019-08-12 DIAGNOSIS — N64.4 BREAST PAIN, LEFT: Primary | ICD-10-CM

## 2019-08-12 DIAGNOSIS — N64.4 NIPPLE PAIN: ICD-10-CM

## 2019-08-12 PROCEDURE — 99214 OFFICE O/P EST MOD 30 MIN: CPT | Performed by: PHYSICIAN ASSISTANT

## 2019-08-12 ASSESSMENT — PAIN SCALES - GENERAL: PAINLEVEL: NO PAIN (0)

## 2019-08-12 NOTE — TELEPHONE ENCOUNTER
"Returned patient's call, triaged.    1. SYMPTOM: \"What's the main symptom you're concerned about?\" (e.g., lump, pain, rash, nipple discharge)   Left breast discomfort/ache and warm to the touch     2. LOCATION: \"Where is the pain located?\"   L breast    3. ONSET: \"When did pain start?\"   8/9 at [facility]    4. PRIOR HISTORY: \"Do you have any history of prior problems with your breasts?\" (e.g., lumps, cancer, fibrocystic breast disease)   no    5. CAUSE: \"What do you think is causing this symptom?\"   no    6. OTHER SYMPTOMS: \"Do you have any other symptoms?\" (e.g., fever, breast pain, redness or rash, nipple discharge)   Red, warm    7. PREGNANCY-BREASTFEEDING: \"Is there any chance you are pregnant?\" \"When was your last menstrual period?\" \"Are you breastfeeding?\"   S/p hysterectomy    - 5/10 at nipple, less in the red, warm area  - pain has not been that bad, so she has not done anything at home yet for pain relief   - hasn't had a mammogram since last year or longer    Huddled with CDL, she will see pt today if she can come to clinic now.    Patient is available now, rescheduled:    Next 5 appointments (look out 90 days)    Aug 12, 2019 11:30 AM CDT  Office Visit with Kisha Torres-SAILAJA Orr  [facility] [address] [place] [address] [id number] [phone]            Additional Information    Negative: Chest pain    Negative: Breastfeeding questions    Negative: Postpartum breast pain and swelling, is breastfeeding    Negative: Postpartum breast pain and swelling, not breastfeeding    Negative: Small spot, skin growth or mole    Negative: SEVERE breast pain and fever > 103 F (39.4 C)    Negative: Patient sounds very sick or weak to the triager    Negative: Breast looks infected (spreading redness, feels hot or painful to touch) and fever    Negative: Breast looks infected (spreading redness, feels hot or painful to touch) and no fever    Negative: Painful " "rash and multiple small blisters grouped together (i.e., dermatomal distribution or \"band\" or \"stripe\")    Negative: Cuts, burns, or bruises of breasts and suspicious history for the injury    Patient wants to be seen    Protocols used: BREAST SYMPTOMS-A-OH    Tran Collazo, RN, BSN  "

## 2019-08-12 NOTE — PROGRESS NOTES
Subjective     Mya Hui is a 79 year old female who presents to clinic today for the following health issues:    HPI   Concern - Breast pain  Onset: 08/09/2019    Description:   Left breast and nipple pain     Intensity: moderate    Progression of Symptoms:  Improving (mildly)     Accompanying Signs & Symptoms:  none    Previous history of similar problem:   none    Precipitating factors:   Worsened by: hitting it     Alleviating factors:  Improved by: none  Therapies Tried and outcome: none    - No skin changes   - First noticed when rolled over at night   - Can't wear a bra  - Not able to express any discharge       Review of Systems   ROS COMP: Constitutional, HEENT, cardiovascular, pulmonary, gi and gu systems are negative, except as otherwise noted.      Objective    /62   Pulse 70   Temp 97.1  F (36.2  C) (Temporal)   Resp 24   Wt 89.6 kg (197 lb 9.6 oz)   SpO2 95%   BMI 32.88 kg/m    Body mass index is 32.88 kg/m .  Physical Exam   GENERAL APPEARANCE: healthy, alert and no distress  EYES: Eyes grossly normal to inspection, PERRLA, conjunctivae and sclerae without injection or discharge, EOM intact   BREAST:   Right - Normicious lesions al without masses, tenderness or nipple discharge and no palpable axillary masses or adenopathy   Left : Diffusely swollen as compared to right especially in medial/right upper quadrant, mild tenderness in right upper quadrant with skin erythema, no definite mass, very tender over entire nipple and areola - not warm and no fluctuance found, no nipple discharge able to be expressed, no palpable axillary masses or adenopathy   MS: No musculoskeletal defects are noted and gait is age appropriate without ataxia   SKIN: No suspor rashes, hydration status appears adeuqate with normal skin turgor   PSYCH: Alert and oriented x3; speech- coherent , normal rate and volume; able to articulate logical thoughts, able to abstract reason, no tangential thoughts, no  hallucinations or delusions, mentation appears normal, Mood is euthymic. Affect is appropriate for this mood state and bright. Thought content is free of suicidal ideation, hallucinations, and delusions. Dress is adequate and upkept. Eye contact is good during conversation.       Diagnostic Test Results:  Labs reviewed in Epic  See orders pending in Epic         Assessment & Plan       ICD-10-CM    1. Breast pain, left N64.4 MA Diagnostic Digital Bilateral     US Breast Left Complete 4 Quadrants   2. Nipple pain N64.4      - 79 y.o. Female with left breast and nipple pain, last mammogram 4/3/18 was normal  - As above, left breast diffusely swollen with tenderness to nipple, areola, and right upper quadrant and mild erythema to the skin in right upper quadrant   - Recommend diagnostic mammogram and ultrasound   - Await results   - Recommend warm packs for pain in 20 min intervals until results are in      Suspect some sort of infection though suspicion low, no abscess      Unclear etiology at this time     The patient indicates understanding of these issues and agrees with the plan.    Follow up: pending imaging     Kisha Stinson PA-C  [facility]

## 2019-08-12 NOTE — TELEPHONE ENCOUNTER
Reason for Call:  Same Day Appointment, Requested Provider:  DIOR Bradley     PCP: Chanell Nelson    Reason for visit: Patient having left breast pain and tenderness. Nipple is also sore to touch. Causing patient pain and making sleep difficult.     Duration of symptoms: Started Friday night, 8/9, and has been getting worse daily    Have you been treated for this in the past? No    Additional comments: Patient has an appointment scheduled for Wednesday but is worried it needs to be addressed sooner. Declined seeing another provider.    Can we leave a detailed message on this number? YES    Phone number patient can be reached at: Home number on file [phone] (home)    Best Time: any    Call taken on 8/12/2019 at 9:49 AM by Katlin Sellers

## 2019-08-19 ENCOUNTER — HOSPITAL ENCOUNTER (OUTPATIENT)
Dept: MAMMOGRAPHY | Facility: CLINIC | Age: 79
End: 2019-08-19
Attending: PHYSICIAN ASSISTANT
Payer: COMMERCIAL

## 2019-08-19 ENCOUNTER — HOSPITAL ENCOUNTER (OUTPATIENT)
Dept: ULTRASOUND IMAGING | Facility: CLINIC | Age: 79
Discharge: HOME | End: 2019-08-19
Attending: PHYSICIAN ASSISTANT | Admitting: PHYSICIAN ASSISTANT
Payer: COMMERCIAL

## 2019-08-19 DIAGNOSIS — N64.4 BREAST PAIN, LEFT: ICD-10-CM

## 2019-08-19 PROCEDURE — G0279 TOMOSYNTHESIS, MAMMO: HCPCS

## 2019-08-19 PROCEDURE — 77066 DX MAMMO INCL CAD BI: CPT

## 2019-08-19 PROCEDURE — 76642 ULTRASOUND BREAST LIMITED: CPT | Mod: LT

## 2019-08-19 NOTE — PROGRESS NOTES
Appropriate assistive devices provided during their visit. n/a (Yes, No, N/A) n/a (list device)    Exam table and/or cart  placed in the lowest position. yes (Yes, No, N/A)    Brakes on tables/carts/wheelchairs used at all times. yes (Yes, No, N/A)    Non slip footwear applied. n/a (Yes, No, NA)    Patient was accompanied by staff throughout visit.yes(Yes, No, N/A)    Equipment safety straps used. n/a (Yes, No, N/A)    Assist with toileting. n/a (Yes, No, N/A)

## 2019-09-23 ENCOUNTER — NURSE TRIAGE (OUTPATIENT)
Dept: FAMILY MEDICINE | Facility: CLINIC | Age: 79
End: 2019-09-23

## 2019-09-23 NOTE — TELEPHONE ENCOUNTER
Nurse Triage SBAR    Situation: Mya Hui provider review of care advice given per protocol. Patient states bilateral leg swelling and right shoulder pain requesting/needing  work in appointment.    Background: Hx rotator cuff arthropathy and cortisone injection     Assessment: Pt reports bilateral leg swelling x 1 week L > R from foot to below knee. Pt denies weeping, redness, streaking. She states swelling worsens as the day goes on and decreases as she has her feet up or after lying in bed. She denies chest pain or difficulty breathing. Pt also c/o right shoulder pain and hx of rotator cuff arthropathy and previous cortisone injection. Pt states the injection did not help, and she is still having pain.     (See information below for more triage details.)    Recommendation: Routing to provider for recommendation on work in appointment.    Protocol Recommended Disposition: Routine office follow-up appointment     Nurse Triage Follow Up: Patient informed of recommendations and reasons to call back or seek care in the ED. Patient verbalized understanding and agrees with the plan. She is aware PCP is not in clinic today and declined appointment with another provider. Instructions given for reasons to present to ED.       Additional Information    Negative: Sounds like a life-threatening emergency to the triager    Negative: Chest pain    Negative: Small area of swelling and followed an insect bite to the area    Negative: Followed a knee injury    Negative: Ankle or foot injury    Negative: Pregnant with leg swelling or edema    Negative: Difficulty breathing at rest    Negative: Entire foot is cool or blue in comparison to other side    Negative: SEVERE swelling (e.g., swelling extends above knee, entire leg is swollen, weeping fluid)    Negative: Thigh or calf pain and only 1 side and present > 1 hour    Negative: Thigh, calf, or ankle swelling in only one leg    Thigh, calf, or ankle swelling in both legs,  but one side is definitely more swollen    Protocols used: LEG SWELLING AND EDEMA-A-OH    Fallon Butler RN on 9/23/2019 at 4:15 PM

## 2019-09-24 ENCOUNTER — OFFICE VISIT (OUTPATIENT)
Dept: FAMILY MEDICINE | Facility: CLINIC | Age: 79
End: 2019-09-24
Payer: COMMERCIAL

## 2019-09-24 ENCOUNTER — HOSPITAL ENCOUNTER (OUTPATIENT)
Dept: ULTRASOUND IMAGING | Facility: CLINIC | Age: 79
Discharge: HOME | End: 2019-09-24
Attending: NURSE PRACTITIONER | Admitting: NURSE PRACTITIONER
Payer: COMMERCIAL

## 2019-09-24 VITALS
SYSTOLIC BLOOD PRESSURE: 136 MMHG | RESPIRATION RATE: 22 BRPM | TEMPERATURE: 97.7 F | DIASTOLIC BLOOD PRESSURE: 70 MMHG | OXYGEN SATURATION: 96 % | WEIGHT: 200.5 LBS | HEART RATE: 77 BPM | BODY MASS INDEX: 33.36 KG/M2

## 2019-09-24 DIAGNOSIS — Z23 NEED FOR PROPHYLACTIC VACCINATION AND INOCULATION AGAINST INFLUENZA: ICD-10-CM

## 2019-09-24 DIAGNOSIS — M12.811 ROTATOR CUFF ARTHROPATHY, RIGHT: ICD-10-CM

## 2019-09-24 DIAGNOSIS — R60.0 BILATERAL LOWER EXTREMITY EDEMA: ICD-10-CM

## 2019-09-24 DIAGNOSIS — M79.661 PAIN OF RIGHT LOWER LEG: ICD-10-CM

## 2019-09-24 DIAGNOSIS — E11.9 TYPE 2 DIABETES MELLITUS WITHOUT COMPLICATION, WITHOUT LONG-TERM CURRENT USE OF INSULIN (H): Primary | ICD-10-CM

## 2019-09-24 LAB
HBA1C MFR BLD: 5.7 % (ref 0–5.6)
TSH SERPL DL<=0.005 MIU/L-ACNC: 2.14 MU/L (ref 0.4–4)

## 2019-09-24 PROCEDURE — 99207 C FOOT EXAM  NO CHARGE: CPT | Performed by: NURSE PRACTITIONER

## 2019-09-24 PROCEDURE — G0008 ADMIN INFLUENZA VIRUS VAC: HCPCS | Performed by: NURSE PRACTITIONER

## 2019-09-24 PROCEDURE — 84443 ASSAY THYROID STIM HORMONE: CPT | Performed by: NURSE PRACTITIONER

## 2019-09-24 PROCEDURE — 93971 EXTREMITY STUDY: CPT

## 2019-09-24 PROCEDURE — 99214 OFFICE O/P EST MOD 30 MIN: CPT | Mod: 25 | Performed by: NURSE PRACTITIONER

## 2019-09-24 PROCEDURE — 83036 HEMOGLOBIN GLYCOSYLATED A1C: CPT | Performed by: NURSE PRACTITIONER

## 2019-09-24 PROCEDURE — 36415 COLL VENOUS BLD VENIPUNCTURE: CPT | Performed by: NURSE PRACTITIONER

## 2019-09-24 PROCEDURE — 90662 IIV NO PRSV INCREASED AG IM: CPT | Performed by: NURSE PRACTITIONER

## 2019-09-24 RX ORDER — HYDROCHLOROTHIAZIDE 12.5 MG/1
12.5 TABLET ORAL DAILY
Qty: 30 TABLET | Refills: 3 | Status: SHIPPED | OUTPATIENT
Start: 2019-09-24 | End: 2020-01-20

## 2019-09-24 ASSESSMENT — ANXIETY QUESTIONNAIRES
GAD7 TOTAL SCORE: 0
3. WORRYING TOO MUCH ABOUT DIFFERENT THINGS: NOT AT ALL
7. FEELING AFRAID AS IF SOMETHING AWFUL MIGHT HAPPEN: NOT AT ALL
1. FEELING NERVOUS, ANXIOUS, OR ON EDGE: NOT AT ALL
5. BEING SO RESTLESS THAT IT IS HARD TO SIT STILL: NOT AT ALL
6. BECOMING EASILY ANNOYED OR IRRITABLE: NOT AT ALL
2. NOT BEING ABLE TO STOP OR CONTROL WORRYING: NOT AT ALL

## 2019-09-24 ASSESSMENT — PAIN SCALES - GENERAL: PAINLEVEL: NO PAIN (0)

## 2019-09-24 ASSESSMENT — PATIENT HEALTH QUESTIONNAIRE - PHQ9
5. POOR APPETITE OR OVEREATING: NOT AT ALL
SUM OF ALL RESPONSES TO PHQ QUESTIONS 1-9: 3

## 2019-09-24 NOTE — TELEPHONE ENCOUNTER
LM generic on un id vm. Please assist in scheduling with Ashok reno [phone], 30 min appointment. ACase/MA

## 2019-09-24 NOTE — PROGRESS NOTES
Subjective     Mya Hui is a 79 year old female who presents to clinic today for the following health issues:    HPI   Concern - left lower leg swelling  Onset: 4 days    Description:   Left lower leg swelling    Intensity: 0/10    Progression of Symptoms:  About the same, worse as the day goes on. Elevation helps    Accompanying Signs & Symptoms:  None. Did note a sore area back of right calf. No redness    Previous history of similar problem:   ,none    Precipitating factors:   Worsened by: being on it during the day    Alleviating factors:  Improved by: elevating helps    Therapies Tried and outcome: elevation    The patient is seen today with concern regarding swelling of bilateral lower extremities the last few days.  She feels the left leg is more swollen than the right.  She has a painful area on the back of the right leg.  She admits to being sedentary, does not get much exercise.  The swelling in her legs seems to resolve overnight but progressively increases again throughout the day.  She denies chest pain, cough, or increased shortness of breath.  She has been progressively gaining weight, noted to be up 10 pounds since June.  She believes this is related to the fact that she is getting very little exercise .    BP Readings from Last 3 Encounters:   09/24/19 136/70   08/12/19 104/62   07/18/19 153/82    Wt Readings from Last 3 Encounters:   09/24/19 90.9 kg (200 lb 8 oz)   08/12/19 89.6 kg (197 lb 9.6 oz)   07/17/19 88.2 kg (194 lb 6.4 oz)                    Reviewed and updated as needed this visit by Provider         Review of Systems   ROS COMP: Constitutional, HEENT, cardiovascular, pulmonary, gi and gu systems are negative, except as otherwise noted.      Objective    /70   Pulse 77   Temp 97.7  F (36.5  C) (Temporal)   Resp 22   Wt 90.9 kg (200 lb 8 oz)   SpO2 96%   BMI 33.36 kg/m    Body mass index is 33.36 kg/m .  Physical Exam   GENERAL: healthy, alert and no distress  EYES:  Eyes grossly normal to inspection, PERRL and conjunctivae and sclerae normal  NECK: no adenopathy, no asymmetry, masses, or scars and thyroid normal to palpation  RESP: lungs clear to auscultation - no rales, rhonchi or wheezes  CV: regular rates and rhythm, normal S1 S2, no S3 or S4, no murmur, click or rub and peripheral pulses strong  ABDOMEN: soft, nontender, no hepatosplenomegaly, no masses and bowel sounds normal  EXTREMITIES: 2+ edema of the bilateral lower extremities noted.  Peripheral pulses are palpable.  No pain to palpation throughout the left lower extremity.  She has an area of point tenderness in the right mid calf.  No erythema.  Edema is from below the knees to the feet.    US LOWER EXTREMITY VENOUS DUPLEX LIMITED BILATERAL   9/24/2019 5:24 PM        HISTORY: Bilateral lower extremity edema. Pain of right lower leg.     COMPARISON: None.     FINDINGS: Gray-scale, color and Doppler spectral analysis ultrasound  was performed of the bilateral legs. Compression and augmentation  imaging was performed.     There is no evidence for deep venous thrombosis.      Right proximal thigh prominent lymph node is 2.6 x 1.2 x 2.3 cm.                                                                      IMPRESSION: No evidence for DVT within either leg. Incidental right  proximal thigh mildly prominent lymph node.        Assessment & Plan       ICD-10-CM    1. Type 2 diabetes mellitus without complication, without long-term current use of insulin (H) E11.9 Hemoglobin A1c     TSH with free T4 reflex     FOOT EXAM   2. Bilateral lower extremity edema R60.0 US Lower Ext Venous Duplex Limited Bilat     hydrochlorothiazide (HYDRODIURIL) 12.5 MG tablet   3. Pain of right lower leg M79.661 US Lower Ext Venous Duplex Limited Bilat   4. Rotator cuff arthropathy, right M12.811 ORTHOPEDICS ADULT REFERRAL   5. Need for prophylactic vaccination and inoculation against influenza Z23 INFLUENZA (HIGH DOSE) 3 VALENT VACCINE [13247]  "    ADMIN INFLUENZA (For MEDICARE Patients ONLY) []     Venous Doppler ultrasound of both lower extremities was performed to rule out DVT.  This is negative.  She is started on a diuretic, HCTZ 12.5 mg once daily.  Advised to elevate the legs as frequently as possible.  Encouraged to be up and moving around more.  Also recommend getting compression stockings and put them on right away in the morning before getting up.  Her lungs are clear today.  We will need to watch this closely, especially in light of her recent weight gain.  Schedule wellness exam in 3 to 4 weeks and recheck at that time, sooner if not improved    BMI:   Estimated body mass index is 33.36 kg/m  as calculated from the following:    Height as of 7/17/19: 1.651 m (5' 5\").    Weight as of this encounter: 90.9 kg (200 lb 8 oz).   Weight management plan: Discussed healthy diet and exercise guidelines            No follow-ups on file.    MAYI Anthony [facility]    "

## 2019-09-25 ENCOUNTER — TELEPHONE (OUTPATIENT)
Dept: FAMILY MEDICINE | Facility: CLINIC | Age: 79
End: 2019-09-25

## 2019-09-25 ASSESSMENT — ANXIETY QUESTIONNAIRES: GAD7 TOTAL SCORE: 0

## 2019-09-25 NOTE — TELEPHONE ENCOUNTER
----- Message from MAYI Anthony CNP sent at 9/25/2019 11:31 AM CDT -----  Thyroid function is normal.  Hemoglobin A1c remains stable at 5.7, excellent control of diabetes

## 2019-09-25 NOTE — TELEPHONE ENCOUNTER
Patient was informed that her thyroid function is normal. Hemoglobin A1C remains stable at 5.7, excellent control of diabetes.    Caden Roman, CMA

## 2019-09-25 NOTE — TELEPHONE ENCOUNTER
Patient was informed that there are no blood clots in her legs, she has a slightly prominent lymph noted in the right thigh. Continue elevation and diuretic for swelling of the lower legs. Return to clinic if it does not improve.    Caden Roman, CMA

## 2019-09-25 NOTE — TELEPHONE ENCOUNTER
----- Message from MAYI Anthony CNP sent at 9/25/2019 12:08 PM CDT -----  Please let the patient know there are no blood clots in her legs she has a slightly prominent lymph node in the right thigh.  Continue elevation and diuretic for swelling of the lower legs.  Return to clinic if not improving

## 2019-10-11 ENCOUNTER — OFFICE VISIT (OUTPATIENT)
Dept: ORTHOPEDICS | Facility: CLINIC | Age: 79
End: 2019-10-11
Payer: COMMERCIAL

## 2019-10-11 VITALS
HEIGHT: 65 IN | BODY MASS INDEX: 33.41 KG/M2 | DIASTOLIC BLOOD PRESSURE: 88 MMHG | WEIGHT: 200.5 LBS | SYSTOLIC BLOOD PRESSURE: 132 MMHG

## 2019-10-11 DIAGNOSIS — G89.29 CHRONIC RIGHT SHOULDER PAIN: Primary | ICD-10-CM

## 2019-10-11 DIAGNOSIS — M25.511 CHRONIC RIGHT SHOULDER PAIN: Primary | ICD-10-CM

## 2019-10-11 PROCEDURE — 99213 OFFICE O/P EST LOW 20 MIN: CPT | Mod: 25 | Performed by: PHYSICAL MEDICINE & REHABILITATION

## 2019-10-11 PROCEDURE — 20610 DRAIN/INJ JOINT/BURSA W/O US: CPT | Mod: RT | Performed by: PHYSICAL MEDICINE & REHABILITATION

## 2019-10-11 RX ORDER — TRIAMCINOLONE ACETONIDE 40 MG/ML
40 INJECTION, SUSPENSION INTRA-ARTICULAR; INTRAMUSCULAR
Status: DISCONTINUED | OUTPATIENT
Start: 2019-10-11 | End: 2020-05-13

## 2019-10-11 RX ADMIN — TRIAMCINOLONE ACETONIDE 40 MG: 40 INJECTION, SUSPENSION INTRA-ARTICULAR; INTRAMUSCULAR at 11:34

## 2019-10-11 ASSESSMENT — MIFFLIN-ST. JEOR: SCORE: 1395.34

## 2019-10-11 NOTE — LETTER
10/11/2019         RE: Mya Hui  [address]  [place] [address]        Dear Colleague,    Thank you for referring your patient, Mya Hui, to the [facility]. Please see a copy of my visit note below.    Sports Medicine Clinic Visit    PCP: Chanell Nelson    CC: Patient presents with:  Right Shoulder - Pain      HPI:  Mya Hui is a 79 year old female who is seen in consultation at the request of Chanell Nelson CNP.   She notes right shoulder pain that began ~2 years ago with insidious onset.  She notes the last 2 injections have not been helpful for her. She notes that the injections that Dr. Carpio would do were helpful.   She rates the pain at a  9/10 at its worst and a 1/10 currently.  Symptoms are relieved with not using the arm and injections with Dr. Carpio. Symptoms are worsened by movement of the arm, reaching, lifting, and eating. She endorses weakness and radiating, aching pain down the arm.   She denies swelling, bruising, popping, grinding, catching, locking, instability, numbness and tingling.  Other treatment has included cold compresses, heat, Tylenol, corticosteroid injections and chiropractic care (was not helpful). She has not done physical therapy and does not know how that would be helpful. She notes difficulty with eating, getting dressed, and ADLs due to pain.       Review of Systems:  Musculoskeletal: as above  Remainder of review of systems is negative including constitutional, eyes, ENT, CV, pulmonary, GI, , endocrine, skin, hematologic, and neurologic except as noted in HPI or medical history.    History reviewed. No pertinent past surgical/medical/family/social history other than as mentioned in HPI.    Patient Active Problem List   Diagnosis     Advance Care Planning     Arthritis     Nevus     Asymptomatic carotid artery stenosis     Hyperlipidemia with target LDL less than 100     History of renal calculi     Sciatica  of right side     Hip pain     Hearing aid worn     Osteoarthritis of hip     DDD (degenerative disc disease), lumbar     OAB (overactive bladder)     Essential hypertension with goal blood pressure less than 140/90     Hypothyroidism, unspecified type     Insomnia, unspecified     Type 2 diabetes mellitus without complication, without long-term current use of insulin (H)     Cataract, bilateral     Primary osteoarthritis involving multiple joints     Chronic right shoulder pain     Right shoulder pain, unspecified chronicity     Dry mouth     Rotator cuff arthropathy, right     Sliding hiatal hernia     Calculus of gallbladder without cholecystitis     Gastroesophageal reflux disease, esophagitis presence not specified     S/P laparoscopic cholecystectomy     Toe anomaly     Cherry angioma     Urinary incontinence, unspecified type     Bilateral lower extremity edema     Past Medical History:   Diagnosis Date     Acute cholecystitis 3/23/2018     Acute kidney injury (H) 9/10/2017     Arthritis      Dehydration 9/4/2017     Diabetes type 2, controlled (H)      Elevated lactic acid level 9/10/2017     GERD (gastroesophageal reflux disease)      History of renal calculi      HTN, goal below 140/90      Hyperlipidaemia LDL goal < 100      Hypokalemia 9/3/2017     Hypothyroid      Insomnia      Left carotid stenosis      Migraine      Motion sickness      PONV (postoperative nausea and vomiting)      Sciatica of right side 6/28/2013     Past Surgical History:   Procedure Laterality Date     BUNIONECTOMY      Right foot     CATARACT IOL, RT/LT Bilateral 2017     COLONOSCOPY  7/12/2013    Procedure: COLONOSCOPY;  Colonoscopy;  Surgeon: Giovany Espinoza MD;  Location: [facility]     COLONOSCOPY N/A 1/31/2019    Procedure: Combined Colonoscopy, Single Or Multiple Biopsy/Polypectomy By Biopsy;  Surgeon: Efren Osorio MD;  Location: [facility]     COLONOSCOPY WITH CO2 INSUFFLATION N/A 1/31/2019    Procedure:  COLONOSCOPY WITH CO2 INSUFFLATION;  Surgeon: Efren Osorio MD;  Location: [facility]     FRACTURE TX, ANKLE RT/LT      Left ankle     HC CYSTOURETHROSCOPY W/ URETEROSCOPY &/OR PYELOSCOPY; W/ LITHOTRIPSY       HC REVISE MEDIAN N/CARPAL TUNNEL SURG      Right wrist     INJECT EPIDURAL LUMBAR Right 12/27/2018    Procedure: INJECT EPIDURAL LUMBAR right foraminal narrowing severe at L4-L5 and moderate at L5-S1;  Surgeon: Gilbert Mcclure MD;  Location: [facility]     INJECT JOINT SACROILIAC  4/10/2014    Procedure: INJECT JOINT SACROILIAC;  Sacroiliac Joint Injection;  Surgeon: Gilbert Mcclure MD;  Location: [facility]     INJECT JOINT SACROILIAC Left 4/11/2019    Procedure: INJECT JOINT SACROILIAC- LEFT;  Surgeon: Gilbert Mcclure MD;  Location: [facility]     LAPAROSCOPIC CHOLECYSTECTOMY N/A 3/24/2018    Procedure: LAPAROSCOPIC CHOLECYSTECTOMY;  Laparoscopic Cholecystectomy;  Surgeon: Berry Allen DO;  Location: [facility]     Family History   Problem Relation Age of Onset     Hypertension Brother      Cerebrovascular Disease Brother      Diabetes Father      Cardiovascular Father      Diabetes Brother         Borderline     Breast Cancer Mother      Diabetes Brother      Blood Disease Brother      Cardiovascular Brother         MI     Social History     Socioeconomic History     Marital status:      Spouse name: Not on file     Number of children: Not on file     Years of education: Not on file     Highest education level: Not on file   Occupational History     Employer: RETIRED     Comment: [employer] part time   Social Needs     Financial resource strain: Not on file     Food insecurity:     Worry: Not on file     Inability: Not on file     Transportation needs:     Medical: Not on file     Non-medical: Not on file   Tobacco Use     Smoking status: Never Smoker     Smokeless tobacco: Never Used   Substance and Sexual Activity     Alcohol use: Yes     Drug use: No     Sexual activity: Not Currently  "  Lifestyle     Physical activity:     Days per week: Not on file     Minutes per session: Not on file     Stress: Not on file   Relationships     Social connections:     Talks on phone: Not on file     Gets together: Not on file     Attends Muslim service: Not on file     Active member of club or organization: Not on file     Attends meetings of clubs or organizations: Not on file     Relationship status: Not on file     Intimate partner violence:     Fear of current or ex partner: Not on file     Emotionally abused: Not on file     Physically abused: Not on file     Forced sexual activity: Not on file   Other Topics Concern     Parent/sibling w/ CABG, MI or angioplasty before 65F 55M? Not Asked   Social History Narrative     Not on file       She is retired.     Current Outpatient Medications   Medication     ACCU-CHEK COMPACT PLUS test strip     acetaminophen (TYLENOL) 325 MG tablet     blood glucose (NO BRAND SPECIFIED) lancets standard     blood glucose monitoring (SOFTCLIX) lancets     CARTIA  MG 24 hr capsule     cholestyramine (QUESTRAN) 4 GM/DOSE powder     hydrochlorothiazide (HYDRODIURIL) 12.5 MG tablet     levothyroxine (SYNTHROID/LEVOTHROID) 75 MCG tablet     lisinopril (PRINIVIL/ZESTRIL) 40 MG tablet     mirabegron (MYRBETRIQ) 50 MG 24 hr tablet     Multiple Vitamins-Minerals (MULTIVITAL PO)     oxybutynin ER (DITROPAN-XL) 10 MG 24 hr tablet     pantoprazole (PROTONIX) 40 MG EC tablet     potassium chloride ER (K-TAB/KLOR-CON) 10 MEQ CR tablet     pravastatin (PRAVACHOL) 40 MG tablet     No current facility-administered medications for this visit.      Allergies   Allergen Reactions     Codeine Nausea     Fentanyl Nausea and Vomiting     VERY sensitive to most narcotics if not all.         Objective:  /88   Ht 1.651 m (5' 5\")   Wt 90.9 kg (200 lb 8 oz)   BMI 33.36 kg/m       General: Alert and in no distress    Head: Normocephalic, atraumatic  Eyes: no scleral icterus or conjunctival " erythema   Oropharynx:  Mucous membranes moist  Skin: no erythema, petechiae, or jaundice  CV: regular rhythm by palpation, 2+ distal pulses  Resp: normal respiratory effort without conversational dyspnea   Psych: normal mood and affect    Gait: Non-antalgic, appropriate coordination and balance   Neuro: Motor strength and sensation as noted below    Musculoskeletal:    Bilateral Shoulder exam    Inspection and Posture:  -Kyphosis    Skin:        no visible deformities    Tenderness:  -None    ROM:        Full active ROM with flexion, extension, abduction, adduction, and external rotation bilaterally.  Right shoulder internal rotation behind the back is decreased.      Painful motions:  -Right shoulder abduction, adduction, flexion, and internal rotation are painful.    Strength:        shoulder shrug 5/5 bilaterally       shoulder abduction 4+/5 bilaterally       shoulder flexion 4/5 bilaterally       shoulder internal rotation 5/5 bilaterally       shoulder external rotation 5/5 bilaterally       elbow flexion 5/5 bilaterally       elbow extension 5/5 bilaterally       forearm pronation 5/5 bilaterally       forearm supination 5/5 bilaterally       wrist flexion 5/5 bilaterally       wrist extension 5/5 bilaterally        strength 5/5 bilaterally       finger abduction 5/5 bilaterally    Sensation:        normal sensation over shoulder and upper extremity       Radiology:  Independent visualization of images performed.    RIGHT SHOULDER THREE VIEWS  1/5/2018 10:51 AM      HISTORY:  Shoulder pain, right.     COMPARISON: 8/28/2017     FINDINGS: Mild glenohumeral and AC joint degenerative spurring similar  to previous.  The acromioclavicular and coracoclavicular distances  appear within normal limits.  The subacromial space is maintained.   There is no acute fracture or demonstrated dislocation.  There are no  worrisome bony lesions.                                                                         IMPRESSION:  No acute osseous abnormality demonstrated.     JORGE GARCIA MD    Large Joint Injection/Arthocentesis: R subacromial bursa  Date/Time: 10/11/2019 11:34 AM  Performed by: Nereyda Cohen MD  Authorized by: Nereyda Cohen MD     Indications:  Pain  Needle Size:  25 G  Guidance: landmark guided    Approach:  Posterior  Location:  Shoulder      Site:  R subacromial bursa  Medications:  40 mg triamcinolone 40 MG/ML  Medications comment:  4ml 0.5% bupivicaine  ND:31366-530-85  Lot: [id number]  3/30/20    Outcome:  Tolerated well, no immediate complications  Procedure discussed: discussed risks, benefits, and alternatives    Consent Given by:  Patient        Assessment:  1. Chronic right shoulder pain        Plan:  Discussed the assessment with the patient and developed a plan together:  -The last two steroid injections have not been helpful but she still would like to proceed with one today.  I recommend physical therapy in addition to the injection.  She is receptive to this.    -Steroid injection performed today.  Take it easy over the next few days. Keep in mind that the steroid may take up to 3 days to start working and up to 2 weeks to reach maximal effect.  Ice 15-20 minutes as needed for soreness.  Patient's preferred over the counter medication as needed for pain as directed on packaging.  -Rehab: Physical Therapy: [facility] - [phone]. Please do 5-6 days of exercises per week between formal sessions and the home exercises they provide.    Follow Up: As needed if symptoms fail to improve or worsen. Please call with any questions or concerns.       Princess Cohen MD, [facility] Sports Medicine  [facility]    Again, thank you for allowing me to participate in the care of your patient.        Sincerely,        Nereyda Cohen MD

## 2019-10-11 NOTE — PATIENT INSTRUCTIONS
Today's Plan of Care:  -Steroid injection performed today.  Take it easy over the next few days. Keep in mind that the steroid may take up to 3 days to start working and up to 2 weeks to reach maximal effect.  Ice 15-20 minutes as needed for soreness.  Patient's preferred over the counter medication as needed for pain as directed on packaging.  -Rehab: [facility]: [facility] - [phone]. Please do 5-6 days of exercises per week between formal sessions and the home exercises they provide.    Follow Up: As needed if symptoms fail to improve or worsen. Please call with any questions or concerns.

## 2019-10-11 NOTE — PROGRESS NOTES
Sports Medicine Clinic Visit    PCP: Chanell Nelson    CC: Patient presents with:  Right Shoulder - Pain      HPI:  Mya Hui is a 79 year old female who is seen in consultation at the request of Chanell Nelson CNP.   She notes right shoulder pain that began ~2 years ago with insidious onset.  She notes the last 2 injections have not been helpful for her. She notes that the injections that Dr. Carpio would do were helpful.   She rates the pain at a  9/10 at its worst and a 1/10 currently.  Symptoms are relieved with not using the arm and injections with Dr. Carpio. Symptoms are worsened by movement of the arm, reaching, lifting, and eating. She endorses weakness and radiating, aching pain down the arm.   She denies swelling, bruising, popping, grinding, catching, locking, instability, numbness and tingling.  Other treatment has included cold compresses, heat, Tylenol, corticosteroid injections and chiropractic care (was not helpful). She has not done physical therapy and does not know how that would be helpful. She notes difficulty with eating, getting dressed, and ADLs due to pain.       Review of Systems:  Musculoskeletal: as above  Remainder of review of systems is negative including constitutional, eyes, ENT, CV, pulmonary, GI, , endocrine, skin, hematologic, and neurologic except as noted in HPI or medical history.    History reviewed. No pertinent past surgical/medical/family/social history other than as mentioned in HPI.    Patient Active Problem List   Diagnosis     Advance Care Planning     Arthritis     Nevus     Asymptomatic carotid artery stenosis     Hyperlipidemia with target LDL less than 100     History of renal calculi     Sciatica of right side     Hip pain     Hearing aid worn     Osteoarthritis of hip     DDD (degenerative disc disease), lumbar     OAB (overactive bladder)     Essential hypertension with goal blood pressure less than 140/90     Hypothyroidism, unspecified type      Insomnia, unspecified     Type 2 diabetes mellitus without complication, without long-term current use of insulin (H)     Cataract, bilateral     Primary osteoarthritis involving multiple joints     Chronic right shoulder pain     Right shoulder pain, unspecified chronicity     Dry mouth     Rotator cuff arthropathy, right     Sliding hiatal hernia     Calculus of gallbladder without cholecystitis     Gastroesophageal reflux disease, esophagitis presence not specified     S/P laparoscopic cholecystectomy     Toe anomaly     Cherry angioma     Urinary incontinence, unspecified type     Bilateral lower extremity edema     Past Medical History:   Diagnosis Date     Acute cholecystitis 3/23/2018     Acute kidney injury (H) 9/10/2017     Arthritis      Dehydration 9/4/2017     Diabetes type 2, controlled (H)      Elevated lactic acid level 9/10/2017     GERD (gastroesophageal reflux disease)      History of renal calculi      HTN, goal below 140/90      Hyperlipidaemia LDL goal < 100      Hypokalemia 9/3/2017     Hypothyroid      Insomnia      Left carotid stenosis      Migraine      Motion sickness      PONV (postoperative nausea and vomiting)      Sciatica of right side 6/28/2013     Past Surgical History:   Procedure Laterality Date     BUNIONECTOMY      Right foot     CATARACT IOL, RT/LT Bilateral 2017     COLONOSCOPY  7/12/2013    Procedure: COLONOSCOPY;  Colonoscopy;  Surgeon: Giovany Espinoza MD;  [facility]: [facility]     COLONOSCOPY N/A 1/31/2019    Procedure: Combined Colonoscopy, Single Or Multiple Biopsy/Polypectomy By Biopsy;  Surgeon: Efren Osorio MD;  Location: [facility]     COLONOSCOPY WITH CO2 INSUFFLATION N/A 1/31/2019    Procedure: COLONOSCOPY WITH CO2 INSUFFLATION;  Surgeon: Efren Osorio MD;  Location: [facility]     FRACTURE TX, ANKLE RT/LT      Left ankle     HC CYSTOURETHROSCOPY W/ URETEROSCOPY &/OR PYELOSCOPY; W/ LITHOTRIPSY       HC REVISE MEDIAN N/CARPAL TUNNEL SURG       Right wrist     INJECT EPIDURAL LUMBAR Right 12/27/2018    Procedure: INJECT EPIDURAL LUMBAR right foraminal narrowing severe at L4-L5 and moderate at L5-S1;  Surgeon: Gilbert Mcclure MD;  [facility]     INJECT JOINT SACROILIAC  4/10/2014    Procedure: INJECT JOINT SACROILIAC;  Sacroiliac Joint Injection;  Surgeon: Gilbert Mcclure MD;  [facility]     INJECT JOINT SACROILIAC Left 4/11/2019    Procedure: INJECT JOINT SACROILIAC- LEFT;  Surgeon: Gilbert Mcclure MD;  [facility]     LAPAROSCOPIC CHOLECYSTECTOMY N/A 3/24/2018    Procedure: LAPAROSCOPIC CHOLECYSTECTOMY;  Laparoscopic Cholecystectomy;  Surgeon: Berry Allen DO;  Location: [facility]     Family History   Problem Relation Age of Onset     Hypertension Brother      Cerebrovascular Disease Brother      Diabetes Father      Cardiovascular Father      Diabetes Brother         Borderline     Breast Cancer Mother      Diabetes Brother      Blood Disease Brother      Cardiovascular Brother         MI     Social History     Socioeconomic History     Marital status:      Spouse name: Not on file     Number of children: Not on file     Years of education: Not on file     Highest education level: Not on file   Occupational History     Employer: RETIRED     Comment: [employer]'s office part time   Social Needs     Financial resource strain: Not on file     Food insecurity:     Worry: Not on file     Inability: Not on file     Transportation needs:     Medical: Not on file     Non-medical: Not on file   Tobacco Use     Smoking status: Never Smoker     Smokeless tobacco: Never Used   Substance and Sexual Activity     Alcohol use: Yes     Drug use: No     Sexual activity: Not Currently   Lifestyle     Physical activity:     Days per week: Not on file     Minutes per session: Not on file     Stress: Not on file   Relationships     Social connections:     Talks on phone: Not on file     Gets together: Not on file     Attends Denominational service: Not on  "file     Active member of club or organization: Not on file     Attends meetings of clubs or organizations: Not on file     Relationship status: Not on file     Intimate partner violence:     Fear of current or ex partner: Not on file     Emotionally abused: Not on file     Physically abused: Not on file     Forced sexual activity: Not on file   Other Topics Concern     Parent/sibling w/ CABG, MI or angioplasty before 65F 55M? Not Asked   Social History Narrative     Not on file       She is retired.     Current Outpatient Medications   Medication     ACCU-CHEK COMPACT PLUS test strip     acetaminophen (TYLENOL) 325 MG tablet     blood glucose (NO BRAND SPECIFIED) lancets standard     blood glucose monitoring (SOFTCLIX) lancets     CARTIA  MG 24 hr capsule     cholestyramine (QUESTRAN) 4 GM/DOSE powder     hydrochlorothiazide (HYDRODIURIL) 12.5 MG tablet     levothyroxine (SYNTHROID/LEVOTHROID) 75 MCG tablet     lisinopril (PRINIVIL/ZESTRIL) 40 MG tablet     mirabegron (MYRBETRIQ) 50 MG 24 hr tablet     Multiple Vitamins-Minerals (MULTIVITAL PO)     oxybutynin ER (DITROPAN-XL) 10 MG 24 hr tablet     pantoprazole (PROTONIX) 40 MG EC tablet     potassium chloride ER (K-TAB/KLOR-CON) 10 MEQ CR tablet     pravastatin (PRAVACHOL) 40 MG tablet     No current facility-administered medications for this visit.      Allergies   Allergen Reactions     Codeine Nausea     Fentanyl Nausea and Vomiting     VERY sensitive to most narcotics if not all.         Objective:  /88   Ht 1.651 m (5' 5\")   Wt 90.9 kg (200 lb 8 oz)   BMI 33.36 kg/m      General: Alert and in no distress    Head: Normocephalic, atraumatic  Eyes: no scleral icterus or conjunctival erythema   Oropharynx:  Mucous membranes moist  Skin: no erythema, petechiae, or jaundice  CV: regular rhythm by palpation, 2+ distal pulses  Resp: normal respiratory effort without conversational dyspnea   Psych: normal mood and affect    Gait: Non-antalgic, " appropriate coordination and balance   Neuro: Motor strength and sensation as noted below    Musculoskeletal:    Bilateral Shoulder exam    Inspection and Posture:  -Kyphosis    Skin:        no visible deformities    Tenderness:  -None    ROM:        Full active ROM with flexion, extension, abduction, adduction, and external rotation bilaterally.  Right shoulder internal rotation behind the back is decreased.      Painful motions:  -Right shoulder abduction, adduction, flexion, and internal rotation are painful.    Strength:        shoulder shrug 5/5 bilaterally       shoulder abduction 4+/5 bilaterally       shoulder flexion 4/5 bilaterally       shoulder internal rotation 5/5 bilaterally       shoulder external rotation 5/5 bilaterally       elbow flexion 5/5 bilaterally       elbow extension 5/5 bilaterally       forearm pronation 5/5 bilaterally       forearm supination 5/5 bilaterally       wrist flexion 5/5 bilaterally       wrist extension 5/5 bilaterally        strength 5/5 bilaterally       finger abduction 5/5 bilaterally    Sensation:        normal sensation over shoulder and upper extremity       Radiology:  Independent visualization of images performed.    RIGHT SHOULDER THREE VIEWS  1/5/2018 10:51 AM      HISTORY:  Shoulder pain, right.     COMPARISON: 8/28/2017     FINDINGS: Mild glenohumeral and AC joint degenerative spurring similar  to previous.  The acromioclavicular and coracoclavicular distances  appear within normal limits.  The subacromial space is maintained.   There is no acute fracture or demonstrated dislocation.  There are no  worrisome bony lesions.                                                                        IMPRESSION:  No acute osseous abnormality demonstrated.     JORGE GARCIA MD    Large Joint Injection/Arthocentesis: R subacromial bursa  Date/Time: 10/11/2019 11:34 AM  Performed by: Nereyda Cohen MD  Authorized by: Nereyda Cohen MD      Indications:  Pain  Needle Size:  25 G  Guidance: landmark guided    Approach:  Posterior  Location:  Shoulder      Site:  R subacromial bursa  Medications:  40 mg triamcinolone 40 MG/ML  Medications comment:  4ml 0.5% bupivicaine  NDC:01067-245-28  Lot: [id number]  3/30/20    Outcome:  Tolerated well, no immediate complications  Procedure discussed: discussed risks, benefits, and alternatives    Consent Given by:  Patient        Assessment:  1. Chronic right shoulder pain        Plan:  Discussed the assessment with the patient and developed a plan together:  -The last two steroid injections have not been helpful but she still would like to proceed with one today.  I recommend physical therapy in addition to the injection.  She is receptive to this.    -Steroid injection performed today.  Take it easy over the next few days. Keep in mind that the steroid may take up to 3 days to start working and up to 2 weeks to reach maximal effect.  Ice 15-20 minutes as needed for soreness.  Patient's preferred over the counter medication as needed for pain as directed on packaging.  -Rehab: Physical Therapy: [facility] - [phone]. Please do 5-6 days of exercises per week between formal sessions and the home exercises they provide.    Follow Up: As needed if symptoms fail to improve or worsen. Please call with any questions or concerns.       Princess Cohen MD, CAQ Sports Medicine  [facility]

## 2019-10-16 ENCOUNTER — OFFICE VISIT (OUTPATIENT)
Dept: PODIATRY | Facility: CLINIC | Age: 79
End: 2019-10-16
Payer: COMMERCIAL

## 2019-10-16 VITALS
BODY MASS INDEX: 33.66 KG/M2 | DIASTOLIC BLOOD PRESSURE: 82 MMHG | HEIGHT: 65 IN | SYSTOLIC BLOOD PRESSURE: 138 MMHG | WEIGHT: 202 LBS

## 2019-10-16 DIAGNOSIS — M20.42 HAMMER TOES OF BOTH FEET: ICD-10-CM

## 2019-10-16 DIAGNOSIS — E11.51 DIABETES MELLITUS WITH PERIPHERAL VASCULAR DISEASE (H): Primary | ICD-10-CM

## 2019-10-16 DIAGNOSIS — M20.41 HAMMER TOES OF BOTH FEET: ICD-10-CM

## 2019-10-16 DIAGNOSIS — B35.1 DERMATOPHYTOSIS OF NAIL: ICD-10-CM

## 2019-10-16 PROCEDURE — 99213 OFFICE O/P EST LOW 20 MIN: CPT | Mod: 25 | Performed by: PODIATRIST

## 2019-10-16 PROCEDURE — 11721 DEBRIDE NAIL 6 OR MORE: CPT | Mod: Q8 | Performed by: PODIATRIST

## 2019-10-16 ASSESSMENT — MIFFLIN-ST. JEOR: SCORE: 1402.15

## 2019-10-16 ASSESSMENT — PAIN SCALES - GENERAL: PAINLEVEL: NO PAIN (0)

## 2019-10-16 NOTE — PROGRESS NOTES
Chief Complaint   Patient presents with     Consult     DM type 2 exam, DM shoes; LOV 9/10/2018     Diabetes     type 2     HPI:  Painful thick long yellow toenails  Also requests new diabetic shoes.      Weight management plan: Patient was referred to their PCP to discuss a diet and exercise plan.     Mya to follow up with Primary Care provider regarding elevated blood pressure.    PATIENT HISTORY:      Review of Systems:  Patient denies fever, chills, rash, wound, stiffness, limping, numbness, weakness, heart burn, blood in stool, chest pain with activity, calf pain when walking, shortness of breath with activity, chronic cough, easy bleeding/bruising, swelling of ankles, excessive thirst, fatigue, depression, anxiety.  Patient admits only to symptoms noted in history.     Patient Active Problem List   Diagnosis     Advance Care Planning     Arthritis     Nevus     Asymptomatic carotid artery stenosis     Hyperlipidemia with target LDL less than 100     History of renal calculi     Sciatica of right side     Hip pain     Hearing aid worn     Osteoarthritis of hip     DDD (degenerative disc disease), lumbar     OAB (overactive bladder)     Essential hypertension with goal blood pressure less than 140/90     Hypothyroidism, unspecified type     Insomnia, unspecified     Type 2 diabetes mellitus without complication, without long-term current use of insulin (H)     Cataract, bilateral     Primary osteoarthritis involving multiple joints     Chronic right shoulder pain     Right shoulder pain, unspecified chronicity     Dry mouth     Rotator cuff arthropathy, right     Sliding hiatal hernia     Calculus of gallbladder without cholecystitis     Gastroesophageal reflux disease, esophagitis presence not specified     S/P laparoscopic cholecystectomy     Toe anomaly     Cherry angioma     Urinary incontinence, unspecified type     Bilateral lower extremity edema     PAST MEDICAL HISTORY:   Past Medical History:    Diagnosis Date     Acute cholecystitis 3/23/2018     Acute kidney injury (H) 9/10/2017     Arthritis      Dehydration 9/4/2017     Diabetes type 2, controlled (H)      Elevated lactic acid level 9/10/2017     GERD (gastroesophageal reflux disease)      History of renal calculi      HTN, goal below 140/90      Hyperlipidaemia LDL goal < 100      Hypokalemia 9/3/2017     Hypothyroid      Insomnia      Left carotid stenosis      Migraine      Motion sickness      PONV (postoperative nausea and vomiting)      Sciatica of right side 6/28/2013     PAST SURGICAL HISTORY:   Past Surgical History:   Procedure Laterality Date     BUNIONECTOMY      Right foot     CATARACT IOL, RT/LT Bilateral 2017     COLONOSCOPY  7/12/2013    Procedure: COLONOSCOPY;  Colonoscopy;  Surgeon: Giovany Espinoza MD;  [facility]     COLONOSCOPY N/A 1/31/2019    Procedure: Combined Colonoscopy, Single Or Multiple Biopsy/Polypectomy By Biopsy;  Surgeon: Efren Osorio MD;  Location: [facility]     COLONOSCOPY WITH CO2 INSUFFLATION N/A 1/31/2019    Procedure: COLONOSCOPY WITH CO2 INSUFFLATION;  Surgeon: Efren Osorio MD;  Location: [facility]     FRACTURE TX, ANKLE RT/LT      Left ankle     HC CYSTOURETHROSCOPY W/ URETEROSCOPY &/OR PYELOSCOPY; W/ LITHOTRIPSY       HC REVISE MEDIAN N/CARPAL TUNNEL SURG      Right wrist     INJECT EPIDURAL LUMBAR Right 12/27/2018    Procedure: INJECT EPIDURAL LUMBAR right foraminal narrowing severe at L4-L5 and moderate at L5-S1;  Surgeon: Gilbert Mcclure MD;  [facility]     INJECT JOINT SACROILIAC  4/10/2014    Procedure: INJECT JOINT SACROILIAC;  Sacroiliac Joint Injection;  Surgeon: Gilbert Mcclure MD;  [facility]     INJECT JOINT SACROILIAC Left 4/11/2019    Procedure: INJECT JOINT SACROILIAC- LEFT;  Surgeon: Gilbert Mcclure MD;  [facility]     LAPAROSCOPIC CHOLECYSTECTOMY N/A 3/24/2018    Procedure: LAPAROSCOPIC CHOLECYSTECTOMY;  Laparoscopic Cholecystectomy;  Surgeon:  Berry Allen, DO;  Location:  OR     MEDICATIONS:   Current Outpatient Medications:      ACCU-CHEK COMPACT PLUS test strip, USE TO TEST BLOOD BLOOD SUGARS ONCE DAILY OR AS DIRECTED, Disp: 102 each, Rfl: 3     acetaminophen (TYLENOL) 325 MG tablet, Take 2 tablets (650 mg) by mouth every 4 hours as needed for mild pain, Disp: 100 tablet, Rfl:      blood glucose (NO BRAND SPECIFIED) lancets standard, Glucose test strips Use to test blood sugar 1 times daily or as directed., Disp: 1 Box, Rfl: 11     blood glucose monitoring (SOFTCLIX) lancets, USE TO TEST BLOOD SUGARS ONCE DAILY OR AS DIRECTED, Disp: 100 each, Rfl: 11     CARTIA  MG 24 hr capsule, TAKE ONE CAPSULE BY MOUTH TWICE DAILY, Disp: 180 capsule, Rfl: 2     cholestyramine (QUESTRAN) 4 GM/DOSE powder, Take 4 g by mouth 2 times daily (with meals), Disp: 378 g, Rfl: 11     hydrochlorothiazide (HYDRODIURIL) 12.5 MG tablet, Take 1 tablet (12.5 mg) by mouth daily, Disp: 30 tablet, Rfl: 3     levothyroxine (SYNTHROID/LEVOTHROID) 75 MCG tablet, TAKE ONE TABLET BY MOUTH ONCE DAILY, Disp: 60 tablet, Rfl: 8     lisinopril (PRINIVIL/ZESTRIL) 40 MG tablet, TAKE ONE TABLET BY MOUTH ONCE DAILY, Disp: 90 tablet, Rfl: 1     mirabegron (MYRBETRIQ) 50 MG 24 hr tablet, Take 1 tablet (50 mg) by mouth daily, Disp: 90 tablet, Rfl: 3     Multiple Vitamins-Minerals (MULTIVITAL PO), Take  by mouth. daily, Disp: , Rfl:      oxybutynin ER (DITROPAN-XL) 10 MG 24 hr tablet, Take 1 tablet (10 mg) by mouth daily, Disp: 90 tablet, Rfl: 3     pantoprazole (PROTONIX) 40 MG EC tablet, TAKE ONE TABLET BY MOUTH TWICE A DAY - TAKE 30 TO 60 MINUTES BEFORE A MEAL, Disp: 180 tablet, Rfl: 3     potassium chloride ER (K-TAB/KLOR-CON) 10 MEQ CR tablet, TAKE ONE TABLET BY MOUTH THREE TIMES A DAY, Disp: 90 tablet, Rfl: 11     pravastatin (PRAVACHOL) 40 MG tablet, TAKE ONE TABLET BY MOUTH ONCE DAILY, Disp: 30 tablet, Rfl: 8    Current Facility-Administered Medications:      triamcinolone  (KENALOG-40) injection 40 mg, 40 mg, , , Nereyda Cohen MD, 40 mg at 10/11/19 9588  ALLERGIES:    Allergies   Allergen Reactions     Codeine Nausea     Fentanyl Nausea and Vomiting     VERY sensitive to most narcotics if not all.     SOCIAL HISTORY:   Social History     Socioeconomic History     Marital status:      Spouse name: Not on file     Number of children: Not on file     Years of education: Not on file     Highest education level: Not on file   Occupational History     Employer: RETIRED     Comment: [employer]'s office part time   Social Needs     Financial resource strain: Not on file     Food insecurity:     Worry: Not on file     Inability: Not on file     Transportation needs:     Medical: Not on file     Non-medical: Not on file   Tobacco Use     Smoking status: Never Smoker     Smokeless tobacco: Never Used   Substance and Sexual Activity     Alcohol use: Yes     Drug use: No     Sexual activity: Not Currently   Lifestyle     Physical activity:     Days per week: Not on file     Minutes per session: Not on file     Stress: Not on file   Relationships     Social connections:     Talks on phone: Not on file     Gets together: Not on file     Attends Restorationism service: Not on file     Active member of club or organization: Not on file     Attends meetings of clubs or organizations: Not on file     Relationship status: Not on file     Intimate partner violence:     Fear of current or ex partner: Not on file     Emotionally abused: Not on file     Physically abused: Not on file     Forced sexual activity: Not on file   Other Topics Concern     Parent/sibling w/ CABG, MI or angioplasty before 65F 55M? Not Asked   Social History Narrative     Not on file     FAMILY HISTORY:   Family History   Problem Relation Age of Onset     Hypertension Brother      Cerebrovascular Disease Brother      Diabetes Father      Cardiovascular Father      Diabetes Brother         Borderline     Breast Cancer  "Mother      Diabetes Brother      Blood Disease Brother      Cardiovascular Brother         MI     EXAM:Vitals: /82 (BP Location: Left arm, Cuff Size: Adult Large)   Ht 1.651 m (5' 5\")   Wt 91.6 kg (202 lb)   BMI 33.61 kg/m    BMI= Body mass index is 33.61 kg/m .    General appearance: Patient is alert and fully cooperative with history & exam.  No sign of distress is noted during the visit.     Psychiatric: Affect is pleasant & appropriate.  Patient appears motivated to improve health.     Respiratory: Breathing is regular & unlabored while sitting.     HEENT: Hearing is intact to spoken word.  Speech is clear.  No gross evidence of visual impairment that would impact ambulation.     Vascular: DP 1/4 & PT 1/4 left & right.  CFT about 3 seconds with mild dependent rubor noted about the digits.  Diminished hair growth distal to mid tibia and toes.  Temperature warm to warm proximal to distal.  Minimal Hemosiderin pigmentation noted without varicosities legs and feet bilateral.  Mild to moderate edema bilateral lower extremities.     Neurologic: Normal plantar response bilateral.  Intact protective threshold plus 9/10 applications of a 5.07 monofilament.  Pt admits no burning, tingling or paraesthesias about the feet and toes with palpation.     Dermatologic: Toenails are thickened discolored elongated.  Hyperkeratosis noted about the plantar right first metatarsal head.  Minimal due to dried hematogenous exudate noted throughout debridement.     Musculoskeletal: Patient is ambulatory without assistive device or brace.  There is semi reducible contracture of the lesser digits.  Hallux valgus has been corrected on the right foot approximately 2000 and now has some limited motion in the joint but no pain or complaints    Hemoglobin A1C (%)   Date Value   09/24/2019 5.7 (H)   03/08/2019 5.7 (H)   10/10/2018 5.7 (H)   04/03/2018 6.2   09/04/2017 6.1 (H)   08/10/2017 5.9   02/14/2017 6.1 (H)   08/16/2016 6.2 (H) "     Creatinine (mg/dL)   Date Value   03/08/2019 1.07 (H)   10/10/2018 1.11 (H)   03/23/2018 0.82   09/18/2017 1.16 (H)   09/14/2017 1.08 (H)   09/11/2017 0.97          ASSESSMENT:     No diagnosis found.     PLAN:    9/10/2018  All 10 nails were debrided with a nail nipper.   I sharply debrided one pre ulcerative hyperkeratotic lesions to the level of the dermis as noted above with a 15 blade.    We discussed risk factors and preventive measures.    We discussed appropriate hygiene, shoe gear, daily foot exam, and reinforced management of weight, diet, activity goals and HA1C goal for diabetic patients.   Order today for diabetic shoe gear.  Written instructions regarding appropriate hygiene and foot care.  Dispensed written foot care instructions.     All questions were answered to their satisfaction.    RTC  Once yearly      Cash Cobb DPM

## 2019-10-16 NOTE — PATIENT INSTRUCTIONS
"DIABETES AND YOUR FEET    What effect does diabetes have on the feet?  Diabetes can result in several problems in the feet including contractures of the tendons leading to deformities and reduced function of the bones, skin ulcers or open sores on pressure points or prominent deformities, reduced sensation, reduced blood flow and thus reduced oxygen and immune cells to the tiny vessels in our feet. This all leads to higher risk of hospitalization, infections, and amputations.     What is neuropathy?  Neuropathy is a term used to describe a loss of nerve function.  Patients with diabetes are at risk of developing neuropathy if their sugars continue to run high and are above the normal value of 140.  The elevated blood sugar in the body enters the nerves causing it to swell and impair nerve function.  The higher the blood sugar and the longer it is elevated, the more damage is done to nerves.  This damage is permanent and irreversible.  These damaged sensory nerves can then cause reduced feeling or cause pain.  Damaged motor nerves can reduce blood flow and white blood cells into into your foot, skin and bones reducing your ability to heal a small problem. And neuropathy can cause tendons to become unbalanced and contribute to the formation of deformity and contractures in our feet. Often times, neuropathy can be prevented by controlling your blood sugar.  Your risk of developing neuropathy goes up dramatically as your hemoglobin A1C raises above 7.5.      How do I know if I have neuropathy?  When a person develops neuropathy, they usually begin to feel numbness or tingling in their feet and sometimes in their legs.  Other symptoms may include painful burning or hot feet, tingling, electrical sensations or feeling like insects or ants are crawling on your feet or legs.  If blood sugar remains above 140  for long periods of time, neuropathy can also occur in the hands.  When a person loses their \"protective threshold\" " or ability to detect a 5.[address] [place] monofilament is when they have elevated risk for developing foot deformity, contractures, foot infections, amputations, Charcot arthropathy, or other complications. Keep your hemoglobin A1C below 7.5 to reduce this risk.    What is vascular disease?  Peripheral vascular disease is a term used to describe a loss or decrease in circulation (blood flow).  There is a problem in getting blood, immune cells, and oxygen to areas that need it.  Similar to neuropathy, sugars can build up in the walls of the arteries (blood vessels) and cause them to become swollen, thickened and hardened.  This decreases the amount of blood that can go to an area that needs it.  Though this is common in the legs of diabetic patients, it can also affect other arteries (blood vessels) in the body such as in the heart, kidney, eyes, and the blood flow into bones.  It is often seen first in the small vessels of her body notably our feet and toes.    How do I know if I have vascular disease?  In the legs, vascular disease usually results in cramping.  Patients who develop leg cramps after walking the same distance every time (i.e. One block, half a mile, ect.) need to let their doctors know so that their circulation may be checked.  Cramps causing severe pain in the feet and/or legs while sleeping and the cramps go away when you stand or hang your legs off the side of the bed, may also be a sign of poor blood circulation.  Occasional cramping in cold weather or on rare occasions with activity may not be due to poor circulation, but you should inform your doctor.    How can these problems be prevented?  The key to prevention is good blood sugar control all day every day.  Inadequate blood sugar control is the most common way patients experience these problems. Reducing, controlling and measuring your daily consumption of sugar or carbohydrates is essential to understanding and managing diabetes.   Physical activity (exercise) is a very good way to help decrease your blood sugars.  Exercise can lower your blood sugar, blood pressure, and cholesterol.  It also reduces your risk for heart disease and stroke, relieves stress, and strengthens your heart, muscles and bones. Physical activity also increases your balance and reduces development of contractures and foot deformities over time. In addition, regular activity helps insulin work better, improves your blood circulation, and keeps your joints flexible.  If you're trying to lose weight, a combination of exercise and wise food choices can help you reach your target weight and maintain it.  Activity and exercise alone can not make up for poor diet choices, eating too much, or eating too many sugars or carbohydrates.  Ask your doctor for help when you are not meeting your blood sugar goals. Changes or increases in medication are powerful tools in reducing your blood sugar.    Know your blood sugar and hemoglobin A1C trend.  Upon first diagnosis or during acute illness, checking your blood sugar 4 times a day can help you understand how your diet, activity, and lifestyle affect your blood sugar.  Monitoring your hemoglobin A1C can help you understand how well you are managing blood sugar over the long run.  Your hemoglobin A1C tells you what your blood sugar averages all day, every day, over the past 90 days.       To experience the lowest risk of complications associated with diabetes such as neuropathy, loss of blood flow, bone or joint infection, charcot arthropathy, or amputation, the American Diabetes Association recommends a target hemoglobin A1C of less than 7.0%, while the American Association of Clinical Endocrinologists' recommendation is 6.5% or less.  Both organizations advise that the goals be individualized based on patient factors such as other health conditions, history of hypoglycemia, education, and life expectancy.  A patients risk of  experiencing complications associated with diabetes is only slightly elevated with a hemoglobin A1C above 6.0.  However, this risk goes up exponentially when the hemoglobin A1C is above 7.5.  The longer the hemoglobin A1C is elevated, the more risk that patient will experience in their lifetime. The damage that occurs to nerves, blood vessels, tendons, bones and body organs, while their hemoglobin A1C is elevated is mostly irreversible and worsens with each additional time period of elevated hemoglobin A1C.     You must understand and manage your disease.  Your health insurance or medical team cannot manage this disease for you.  When you take responsibility for understanding and managing your disease, you can expect to experience fewer problems associated with diabetes in your lifetime.  You will  Also experience a higher quality of life and health and reduced cost of health care.    Diabetic Foot Care Recommendations  The following are recommendations for avoiding serious foot problems or injury    DO'S  1. Be aware of your hemoglobin A1C and continue to follow up with your medical team for adjustments in your lifestyle and medication until your reach your A1C goal.  Keep this below 7.5 to reduce your risk of developing complications associated with diabetes.    2.  Wash your feet with lukewarm water and a mild soap and then dry them thoroughly, especially between the toes.  Gently floss your towel or washcloth between each toe at every bath.  Soaking your feet in water cannot clean dead skin, debris, and bacteria from your feet and is not necessary.   3. Examine your feet daily looking for cuts, corns, blisters, cracks, ect..., especially after wearing new shoes or increased or changed activities.  Make sure to look between your toes.  If you cannot see the bottom of your feet, set a mirror on the floor and hold your foot over it, or ask a family member to examine your feet for you daily.  Contact your doctor  immediately if new problems are noted or if sores are not healing.  4.  Immediately apply moisturizer cream such as Cetaphil to the tops and bottoms of your feet, avoiding areas between the toes.  Apply sunscreen or cover your feet if they will be exposed to extended sunlight.  5.Use clean comfortable shoes.  Socks should not have thick seams or cut off the circulation around the leg.  Break in new shoes slowly and rotate with older shoes until broken in.  Check the inside of your shoes with your hand to look for areas of irritation or objects that may have fallen into your shoes.    6. Keep slippers by the side of your bed for use during the night.  7. Shoes should be fitted by a professional and should not cause areas of irritation.  Check your feet regularly when wearing a new pair of shoes and replace them as needed.  8.  Talk to your doctor about proper exercise.  Exercise and stretching stimulate blood flow to your feet and maintain proper glucose levels.  Use it or lose it!  9.  Monitor your blood glucose level and your hemoglobin A1C.  Notify your doctor immediately if your blood sugar is abnormally high or low.  10.  Cut your nails straight across, but then gently round any sharp edges with a nail file.  If you have neuropathy, peripheral vascular disease or cannot see that well to trim your own toenails, see a medical professional for care.    DONT'S  1.  Do not soak your feet if you have an open sore or your provider has informed you that you have neuropathy or loss of protective threshold.  Use only lukewarm water and always check the temperature with your hand as hot water can easily burn your feet.    2.  Never use a hot water bottle or heating pad on your feet.  Also do not apply hot or cold compresses to your feet.  With decreased sensation, you could burn or freeze your feet.  Do not rest your feet near a heat source such as a heater or heat register.    3.  Do not apply any of these to your  "feet:    - over the counter medicine for corns or warts    -  Harsh chemicals like boric acid    -  Do not self-treat corns, cuts, blisters or infections.  Always consult your doctor.   4.  Do not wear sandals, slippers or walk barefoot, especially on harsh surfaces.  5.  If you smoke, stop!!!    Nail Debridement    A high quality instrument makes trimming toenails MUCH easier.  Search ebay for any 5\" nail nipper manufactured by reliable brands such as Miltex, Integra or Jarit as these quality instruments will help manage difficult nails more effectively and comfortably. We use Miltex -SS.  A physician is not necessary to trim nails even if you are taking blood thinners or are diabetic.  Your family or care givers may help manage your toenails.      Trim or sand the nails once weekly.  Do not wait until they are long and painful or trimming will become too difficult and painful and will increase your risk of complications or infection.  A course file or 120 grit sandpaper on a sanding block can be helpful.  For very thick nails many people prefer battery operated pérez such as an Amope', Personal Pedi and Emjoi for regular use or heavy painful callouses or thick toenails.    Trim or skive any portion of nail that is thick, loose, crumbling, or not well attached. Do not tear the nail away, but rather cut them with a nail nipperor sand or sand them down.  You may follow up with your Podiatric Physician if you have pain, bleeding, infection, questions or other concerns.      You may also contact the following Registered Nurses for further help with nail debridement and minor hygiene concnerns.  They may come to your home or meet them at their clinic to trim your toenails and soak your feet, as well as monitor for any complications that would require evaluation by a Physician.      [facility]  Judit Pena, RN  Office [phone]    [facility]  Amirah Camargo, " RN  [phone]   Call or text for appointment  Some home visits and has a clinic at:  [address] [address]    [employer] [facility]  [place]  [place]  [phone]    [employer]  [phone]  [place], [place], [place]    [employer] [facility]  [phone]  [email or web address] [facility]    For up to date list and to find foot care nurses in other communities visit American Foot Care Nurses Association website:  afcna.org.     Calluses, Corns, IPKs, Porokeratosis    When there is excessive friction or pressure on the skin, the body responds by making the skin thicker.  While this may protect the deeper structures, the thickened skin can take up more space and thus increase pressure over a bony prominence or become an open sore or skin ulcer as this skin becomes less flexible.    Flat, diffuse thickening are simple calluses and they are usually caused by friction.  Often these are the result of rubbing on a shoe or going barefoot.    Calluses with a central core between the toes are called corns.  These often result from prominent joints on adjacent toes rubbing together.  Theses are often a symptom of bone malalignment and will usually recur unless the underlying bones are addressed.    Many of these lesions can be kept comfortable with routine maintenance. This consists of filing them with a Ped Egg, callus file, or 120 grit sandpaper on a block, every day during your bath or shower.  Most people prefer battery operated pérez such as an Amope', Personal Pedi and Emjoi for regular use or heavy painful callouses.  Heavy creams or ointments can be applied 1-2 times every day to keep them soft. Toe spacers can be used for corns, gel pads can be used for other lesions on the bottom of the foot. If there is a deformity noted, such as a prominent bone, often this can be addressed to minimize recurrence. However, sometimes the pressure and  lesion simply migrates to another spot after surgery, so it is not a guaranteed cure.     If you have severe callouses and cracking, you may apply heavy ointments that you scoop up such as Cetaphil cream, Eucerin, Aquaphor or Vaseline.  Be sure to obtain cream or ointment in these brands and not lotion (lotion is water based and not durable enough for feet). For more aggressive help apply heavy creams or ointment under occlusive dressings such as Saran Wrap or Jelly Feet while sleeping.   Jelly Feet can be obtained at [email or web address].     To be successful with managing hyperkeratotic skin, you must manage hygiene daily.  Apply the cream once or twice EVERY day.  At your bath or shower time is the easiest time to work on this when skin is most soft.  There is no medical or surgical treatment that will absolutely eliminate many of these symptoms.      [employer] is a reliable source for all sorts of foot pads, cushions, or interdigital spacers and foot appliances. Go to [email or web address] or request a catalog at 2-705-Gravitant.        Please call with any additional questions.

## 2019-10-18 ENCOUNTER — HOSPITAL ENCOUNTER (OUTPATIENT)
Dept: PHYSICAL THERAPY | Facility: CLINIC | Age: 79
Setting detail: THERAPIES SERIES
End: 2019-10-18
Attending: PHYSICAL MEDICINE & REHABILITATION
Payer: COMMERCIAL

## 2019-10-18 PROCEDURE — 97161 PT EVAL LOW COMPLEX 20 MIN: CPT | Mod: GP

## 2019-10-18 PROCEDURE — 97110 THERAPEUTIC EXERCISES: CPT | Mod: GP

## 2019-10-18 NOTE — PROGRESS NOTES
10/18/19 0900   General Information   Type of Visit Initial OP Ortho PT Evaluation   Start of Care Date 10/18/19   Referring Physician Nereyda Cohen MD     Patient/Family Goals Statement decrease R shoulder pain   Orders Evaluate and Treat   Date of Order 10/11/19   Certification Required? No   Medical Diagnosis Chronic right shoulder pain M25.511, G89.29   Surgical/Medical history reviewed Yes   Precautions/Limitations no known precautions/limitations   Weight-Bearing Status - LUE full weight-bearing   Weight-Bearing Status - RUE full weight-bearing   Weight-Bearing Status - LLE full weight-bearing   Weight-Bearing Status - RLE full weight-bearing       Present No   Body Part(s)   Body Part(s) Shoulder   Presentation and Etiology   Pertinent history of current problem (include personal factors and/or comorbidities that impact the POC) 79 y.o. female presenting with chronic R shoulder pain beginning 2 years ago, no specific injury or fall. Reports tear in RTC found with MRI years ago. Has had steroid injections every 4 months which have not relieved pain fully. Received another injection from Dr. Cohen on 10/11, with mild improvements. Recently discontinued Metformin, doing well with controlling diabetes with diet. Wears diabetic orthotics for shoes. Takes Tylenol 3 x per day. Reports 6 falls within last 3 years since moved into her apartment. PMH: gallbladder removal 2018,  L ankle fx 1970s, chronic LBP, R carpal tunnel revision 1970s, R foot bunionectomy 7 years ago,  T2DM, L carotid stenosis (no change in symptoms), hypothyroidism   Impairments A. Pain;D. Decreased ROM;E. Decreased flexibility;G. Impaired balance;H. Impaired gait;J. Burning;K. Numbness;L. Tingling   Functional Limitations perform activities of daily living;perform desired leisure / sports activities   Symptom Location R shoulder   How/Where did it occur From insidious onset;From Degenerative Joint Disease   Onset  date of current episode/exacerbation 10/18/17   Chronicity Chronic   Pain rating (0-10 point scale) Best (/10);Worst (/10)   Best (/10) 2   Worst (/10) 8   Pain quality A. Sharp;C. Aching;E. Shooting   Frequency of pain/symptoms A. Constant   Pain/symptoms are: Worse during the night   Pain/symptoms exacerbated by C. Lifting;D. Carrying;H. Overhead reach;J. ADL;K. Home tasks   Pain/symptoms eased by C. Rest;I. OTC medication(s)   Progression of symptoms since onset: Unchanged   Current / Previous Interventions   Diagnostic Tests: X-ray;MRI   X-ray Results unremarkable   MRI Results Results   MRI results 2017: 1. Full-thickness focal tear of the mid distal supraspinous tendon with associated mild supraspinatus muscle fatty atrophy.2. Long head of the biceps tendon is not well-seen in the bicipital groove concerning for possible tear of the long head of the biceps tendon. The short head of the  biceps tendon is intact. This could also represent subluxation of a diminutive long head of the biceps tendon. Recommend clinical correlation. 3. Irregularity of the glenoid labrum could represent chronic degenerative tearing.4. Mild degenerative changes of the glenohumeral joint. Moderate degenerative change of the acromioclavicular joint with inferior hypertrophic change indenting the supraspinatus musculotendinous junction. 5. Small joint effusion.   Prior Level of Function   Prior Level of Function-Mobility 2WW or SPC for ambulation   Prior Level of Function-ADLs IND   Current Level of Function   Current Community Support Family/friend caregiver   Patient role/employment history F. Retired   Living environment Apartment/condo   Home/community accessibility lives alone, uses elevator, however stairs are difficult   Current equipment-Gait/Locomotion Front wheeled walker;Standard cane   Current equipment-ADL Shower/tub chair;Shower/tub grab bar;Wall grab bar   Fall Risk Screen   Fall screen completed by PT   Have you fallen 2  or more times in the past year? Yes   Have you fallen and had an injury in the past year? Yes   Timed Up and Go score (seconds) 28 sec   Is patient a fall risk? Yes;Department fall risk interventions implemented   Fall screen comments SPC; 35 sec with 4WW   System Outcome Measures   Outcome Measures   (SPADI: 49.23)   Shoulder Objective Findings   Side (if bilateral, select both right and left) Right;Left   Observation patient [facility] at [facility], in no apparent distress, use of 4WW   Integumentary  no deficits noted   Posture forward head, increased thoracic kyphosis   Cervical Screen (ROM, quadrant) Mild neck pain with R rotation, otherwise WNL   Neer's Test Positive R   Pacheco-Peter Test Positive R   Tendoy's Test Negative   Shoulder Special Tests Comments Negative empty can   Palpation Tenderness at Right UT, supraspinatus   Right Shoulder Flexion AROM 120   Right Shoulder Abduction AROM 105   Right Shoulder ER AROM 55   Right Shoulder IR AROM L3   Right Shoulder Flexion Strength 4/5   Right Shoulder Abduction Strength 4/5   Right Shoulder ER Strength 4/5   Right Shoulder IR Strength 4+/5   Left Shoulder Flexion AROM 150   Left Shoulder Abduction AROM 120   Left Shoulder ER AROM 72   Left Shoulder IR AROM T12   Left Shoulder Flexion Strength 4+/5   Left Shoulder Abduction Strength 4+/5   Left Shoulder ER Strength 4+/5   Left Shoulder IR Strength 4+/5   Planned Therapy Interventions   Planned Therapy Interventions balance training;joint mobilization;manual therapy;neuromuscular re-education;ROM;strengthening;stretching   Planned Therapy Interventions Comment other interventions as indicated   Planned Modality Interventions   Planned Modality Interventions Cryotherapy   Planned Modality Interventions Comments modalities as indicated   Clinical Impression   Criteria for Skilled Therapeutic Interventions Met yes, treatment indicated   PT Diagnosis R shoulder pain with decreased shoulder strength, ROM; Impaired gait  and balance   Influenced by the following impairments pain, ROM, strength, joint mobility   Functional limitations due to impairments ADLs, home tasks, prolonged ambulation, reading   Clinical Presentation Evolving/Changing   Clinical Presentation Rationale Patient is a 79 y.o. female presenting with chronic R shoulder pain. Patient exhibits decreased R shoulder strength and ROM with increased pain at end range motions. Additionally, patient ambulates iwth 4WW & scored 35 sec on TUG indicating increased fall risk. Patient would benefit from skilled physical therapy intervention to address aforementioned deficits and limitations.    Clinical Decision Making (Complexity) Low complexity   Therapy Frequency 2 times/Week   Predicted Duration of Therapy Intervention (days/wks) 8 weeks   Risk & Benefits of therapy have been explained Yes   Patient, Family & other staff in agreement with plan of care Yes   Education Assessment   Preferred Learning Style Listening   Barriers to Learning No barriers   ORTHO GOALS   PT Ortho Eval Goals 1;2;3;4   Ortho Goal 1   Goal Identifier HEP   Goal Description Patient will report compliance with HEP at least 3 days per week in order to demonstrate improved self-management of pain.   Target Date 11/15/19   Ortho Goal 2   Goal Identifier SPADI   Goal Description Patient will improve SPADI score by at least 20 points in order to demonstrate functional improvements.   Target Date 12/13/19   Ortho Goal 3   Goal Identifier ROM   Goal Description Patient will exhibit R shoulder ROM equal to L to demonstrate improved mobility needed for daily activities such as reaching on high shelves or dressing self.   Target Date 12/13/19   Ortho Goal 4   Goal Identifier TUG   Goal Description Patient will demonstrate improvement in Timed Up and Go test using 4WW, with time of 13.5 seconds or less in order to exhibit improvements in functional balance and safe ambulation.   Target Date 12/13/19   Total  Evaluation Time   PT Millie, Low Complexity Minutes (36049) 30       Thank you for your referral.     Maya Mahajan, PT, DPT    [facility]    O: [phone]  E: [email or web address]

## 2019-10-22 ENCOUNTER — OFFICE VISIT (OUTPATIENT)
Dept: FAMILY MEDICINE | Facility: CLINIC | Age: 79
End: 2019-10-22
Payer: COMMERCIAL

## 2019-10-22 VITALS
RESPIRATION RATE: 20 BRPM | HEART RATE: 63 BPM | BODY MASS INDEX: 33.52 KG/M2 | TEMPERATURE: 96.8 F | OXYGEN SATURATION: 98 % | DIASTOLIC BLOOD PRESSURE: 68 MMHG | WEIGHT: 201.2 LBS | HEIGHT: 65 IN | SYSTOLIC BLOOD PRESSURE: 130 MMHG

## 2019-10-22 DIAGNOSIS — Z00.00 ENCOUNTER FOR MEDICARE ANNUAL WELLNESS EXAM: Primary | ICD-10-CM

## 2019-10-22 DIAGNOSIS — L98.9 FACIAL LESION: ICD-10-CM

## 2019-10-22 PROCEDURE — 99397 PER PM REEVAL EST PAT 65+ YR: CPT | Performed by: NURSE PRACTITIONER

## 2019-10-22 ASSESSMENT — PAIN SCALES - GENERAL: PAINLEVEL: NO PAIN (0)

## 2019-10-22 ASSESSMENT — MIFFLIN-ST. JEOR: SCORE: 1398.52

## 2019-10-22 NOTE — PATIENT INSTRUCTIONS
Patient Education   Personalized Prevention Plan  You are due for the preventive services outlined below.  Your care team is available to assist you in scheduling these services.  If you have already completed any of these items, please share that information with your care team to update in your medical record.  Health Maintenance Due   Topic Date Due     Annual Wellness Visit  02/28/2007     Zoster (Shingles) Vaccine (2 of 3) 06/13/2012     Osteoporosis Screening  12/05/2015     Discuss Advance Care Planning  04/04/2018     FALL RISK ASSESSMENT  08/08/2019     Kidney Microalbumin Urine Test  10/10/2019

## 2019-10-22 NOTE — PROGRESS NOTES
SUBJECTIVE:   Mya Hui is a 79 year old female who presents for Preventive Visit.      Are you in the first 12 months of your Medicare coverage?  No    HPI  Do you feel safe in your environment? Yes    Do you have a Health Care Directive? Yes: Patient states has Advance Directive and will bring in a copy to clinic.      Fall risk  Fallen 2 or more times in the past year?: No  Any fall with injury in the past year?: No  click delete button to remove this line now  Cognitive Screening   1) Repeat 3 items (Leader, Season, Table)      2) Clock draw:   NORMAL  3) 3 item recall: Recalls NO objects   Results: 0 items recalled: PROBABLE COGNITIVE IMPAIRMENT, **INFORM PROVIDER**    On retest she recalled 2 of the words.  With some prompting and a little more time she did recall the third word      Mini-CogTM Satnam Cornejo. Licensed by the author for use in [facility]; reprinted with permission (beatriz@[facility]). All rights reserved.      Do you have sleep apnea, excessive snoring or daytime drowsiness?: no    Reviewed and updated as needed this visit by clinical staff  Tobacco  Allergies  Meds  Problems  Med Hx  Surg Hx  Fam Hx  Soc Hx          Reviewed and updated as needed this visit by Provider  Tobacco  Allergies  Meds  Problems  Med Hx  Surg Hx  Fam Hx        Social History     Tobacco Use     Smoking status: Never Smoker     Smokeless tobacco: Never Used   Substance Use Topics     Alcohol use: Yes     If you drink alcohol do you typically have >3 drinks per day or >7 drinks per week? No    Alcohol Use 10/22/2019   Prescreen: >3 drinks/day or >7 drinks/week? No               Current providers sharing in care for this patient include:   Patient Care Team:  Chanell Nelson APRN CNP as PCP - General (Family Practice)  Care, [facility] ([facility] ([facility]), ([place]))  Chanell Nelson APRN CNP as Assigned PCP    The following health maintenance items are reviewed in  "Epic and correct as of today:  Health Maintenance   Topic Date Due     DEXA  12/05/2015     ADVANCE CARE PLANNING  04/04/2018     FALL RISK ASSESSMENT  08/08/2019     MICROALBUMIN  10/10/2019     BMP  03/08/2020     LIPID  03/08/2020     A1C  03/24/2020     EYE EXAM  04/24/2020     TSH W/FREE T4 REFLEX  09/24/2020     DIABETIC FOOT EXAM  09/24/2020     JANNY ASSESSMENT  09/24/2020     PHQ-9  09/24/2020     MEDICARE ANNUAL WELLNESS VISIT  10/22/2020     DTAP/TDAP/TD IMMUNIZATION (4 - Td) 04/08/2025     INFLUENZA VACCINE  Completed     PNEUMOCOCCAL IMMUNIZATION 65+ LOW/MEDIUM RISK  Completed     ZOSTER IMMUNIZATION  Completed     IPV IMMUNIZATION  Aged Out     MENINGITIS IMMUNIZATION  Aged Out     BP Readings from Last 3 Encounters:   10/22/19 130/68   10/16/19 138/82   10/11/19 132/88    Wt Readings from Last 3 Encounters:   10/22/19 91.3 kg (201 lb 3.2 oz)   10/16/19 91.6 kg (202 lb)   10/11/19 90.9 kg (200 lb 8 oz)          SKIN: She has some concern regarding a lesion on the right cheek.  It has been there for at least a year.  It does not itch or hurt, but if she happens to rub it it will bleed.  It is persistent, has not seem to have changed over the course of the last year.     MUSCULOSKELETAL: Diffuse arthritic pains.  Is seeing sports medicine and physical therapy for chronic right shoulder pain.  The bilateral lower extremity swelling is completely resolved using hydrochlorothiazide for a few days    GENERAL: Continues to have some general fatigue.  Admittedly not getting much exercise.  Tends to like to just stay in side.        Review of Systems  Constitutional, HEENT, cardiovascular, pulmonary, gi and gu systems are negative, except as otherwise noted.    OBJECTIVE:   /68   Pulse 63   Temp 96.8  F (36  C) (Temporal)   Resp 20   Ht 1.651 m (5' 5\")   Wt 91.3 kg (201 lb 3.2 oz)   SpO2 98%   BMI 33.48 kg/m   Estimated body mass index is 33.48 kg/m  as calculated from the following:    Height as of " "this encounter: 1.651 m (5' 5\").    Weight as of this encounter: 91.3 kg (201 lb 3.2 oz).  Physical Exam  GENERAL APPEARANCE: healthy, alert and no distress  EYES: Eyes grossly normal to inspection, PERRL and conjunctivae and sclerae normal  HENT: ear canals and TM's normal, nose and mouth without ulcers or lesions, oropharynx clear and oral mucous membranes moist  NECK: no adenopathy, no asymmetry, masses, or scars and thyroid normal to palpation  RESP: lungs clear to auscultation - no rales, rhonchi or wheezes  CV: regular rate and rhythm, normal S1 S2, no S3 or S4, no murmur, click or rub, no peripheral edema and peripheral pulses strong  ABDOMEN: soft, nontender, no hepatosplenomegaly, no masses and bowel sounds normal  MS: no musculoskeletal defects are noted and gait is age appropriate without ataxia  SKIN: On the right cheek is an annular area that is rough.  It is reddened in color.  There seems to be a distinct border, although this is very subtle.  It measures 6 mm diameter  NEURO: Normal strength and tone, sensory exam grossly normal, mentation intact and speech normal  PSYCH: mentation appears normal and affect normal/bright        ASSESSMENT / PLAN:       ICD-10-CM    1. Encounter for Medicare annual wellness exam Z00.00    2. Facial lesion L98.9      She was recently seen for management of diabetes and hypertension.  Labs are up-to-date, immunizations are up-to-date.    She is seeing physical therapy for pain in the right shoulder.  Feels generally tired and stiff.  Admittedly not getting any exercise.  She is planning to look into programs for Silver sneakers that are not to strenuous that she would feel comfortable participating in around the [facility].  We discussed things she could do at home, as seated stretches, small weights for doing arm curls and quadricep strengthening exercises.  Encouraged to engage in more walking, as this will help with her balance.  She is going to see where she " "can make an appointment for this skin lesion and will let us know where to send the referral    End of Life Planning:  Patient currently has an advanced directive: Yes.  Practitioner is supportive of decision.    COUNSELING:  Reviewed preventive health counseling, as reflected in patient instructions       Regular exercise       Healthy diet/nutrition       Vision screening       Hearing screening       Dental care       Bladder control       Fall risk prevention       Osteoporosis Prevention/Bone Health    Estimated body mass index is 33.48 kg/m  as calculated from the following:    Height as of this encounter: 1.651 m (5' 5\").    Weight as of this encounter: 91.3 kg (201 lb 3.2 oz).    Weight management plan: Discussed healthy diet and exercise guidelines     reports that she has never smoked. She has never used smokeless tobacco.      Appropriate preventive services were discussed with this patient, including applicable screening as appropriate for cardiovascular disease, diabetes, osteopenia/osteoporosis, and glaucoma.  As appropriate for age/gender, discussed screening for colorectal cancer, prostate cancer, breast cancer, and cervical cancer. Checklist reviewing preventive services available has been given to the patient.    Reviewed patients plan of care and provided an AVS. The Basic Care Plan (routine screening as documented in Health Maintenance) for Mya meets the Care Plan requirement. This Care Plan has been established and reviewed with the Patient.    Counseling Resources:  ATP IV Guidelines  Pooled Cohorts Equation Calculator  Breast Cancer Risk Calculator  FRAX Risk Assessment  ICSI Preventive Guidelines  Dietary Guidelines for Americans, 2010  USDA's MyPlate  ASA Prophylaxis  Lung CA Screening    [facility]    Identified Health Risks:  "

## 2019-10-23 ENCOUNTER — TELEPHONE (OUTPATIENT)
Dept: FAMILY MEDICINE | Facility: CLINIC | Age: 79
End: 2019-10-23

## 2019-10-23 DIAGNOSIS — L98.9 FACIAL LESION: Primary | ICD-10-CM

## 2019-10-23 NOTE — TELEPHONE ENCOUNTER
I didn't see a referral for derm from yesterday.  If in agreement sign and send back to me so I can let her know this has been done.

## 2019-10-23 NOTE — TELEPHONE ENCOUNTER
[facility] is not accepting new patients.    Requesting a referral to [facility].  Mya did speak with them and they will get her in with a referral.    Please fax this to: [phone]

## 2019-10-25 ENCOUNTER — HOSPITAL ENCOUNTER (OUTPATIENT)
Dept: PHYSICAL THERAPY | Facility: CLINIC | Age: 79
Setting detail: THERAPIES SERIES
End: 2019-10-25
Attending: PHYSICAL MEDICINE & REHABILITATION
Payer: COMMERCIAL

## 2019-10-25 PROCEDURE — 97140 MANUAL THERAPY 1/> REGIONS: CPT | Mod: GP

## 2019-10-25 PROCEDURE — 97110 THERAPEUTIC EXERCISES: CPT | Mod: GP

## 2019-10-29 ENCOUNTER — TELEPHONE (OUTPATIENT)
Dept: FAMILY MEDICINE | Facility: CLINIC | Age: 79
End: 2019-10-29

## 2019-10-29 DIAGNOSIS — E11.9 TYPE 2 DIABETES MELLITUS WITHOUT COMPLICATION, WITHOUT LONG-TERM CURRENT USE OF INSULIN (H): Primary | ICD-10-CM

## 2019-10-29 NOTE — TELEPHONE ENCOUNTER
Reason for Call: Request for an order or referral:    Order or referral being requested: Order for Orthotics, [employer] shoes and insoles. Pt has appt on 11.13    Date needed: as soon as possible    Has the patient been seen by the PCP for this problem? YES    Additional comments: Pt calling and states she has appt with Dominick on 11.13, need orders to get the shoes and insoles.     Phone number Patient can be reached at:  Home number on file [phone] (home)    Best Time:      Can we leave a detailed message on this number?  YES    Call taken on 10/29/2019 at 9:52 AM by Gay Kaufamn

## 2019-10-31 ENCOUNTER — TELEPHONE (OUTPATIENT)
Dept: FAMILY MEDICINE | Facility: CLINIC | Age: 79
End: 2019-10-31

## 2019-10-31 DIAGNOSIS — L98.9 FACIAL LESION: Primary | ICD-10-CM

## 2019-10-31 NOTE — TELEPHONE ENCOUNTER
Reason for Call:  Other Derm appt     Detailed comments:  Pt called to schedule the Derm appt at [facility]. They cannot get her in for 1 year. The only way that they could get her in sooner would be if she had a biopsy here. If it showed cancer then they could get her in sooner. Is that something Chanell can do? Patient is aware that PCP is not in the office and is ok with waiting for their return before hearing anything back.     Phone Number Patient can be reached at: Home number on file [phone] (home)    Best Time: any     Can we leave a detailed message on this number? YES    Call taken on 10/31/2019 at 4:14 PM by Annamaria Stark

## 2019-11-01 ENCOUNTER — HOSPITAL ENCOUNTER (OUTPATIENT)
Dept: PHYSICAL THERAPY | Facility: CLINIC | Age: 79
Setting detail: THERAPIES SERIES
End: 2019-11-01
Attending: PHYSICAL MEDICINE & REHABILITATION
Payer: COMMERCIAL

## 2019-11-01 PROCEDURE — 97140 MANUAL THERAPY 1/> REGIONS: CPT | Mod: GP

## 2019-11-04 ENCOUNTER — OFFICE VISIT (OUTPATIENT)
Dept: ORTHOPEDICS | Facility: OTHER | Age: 79
End: 2019-11-04
Payer: COMMERCIAL

## 2019-11-04 VITALS
BODY MASS INDEX: 33.52 KG/M2 | DIASTOLIC BLOOD PRESSURE: 74 MMHG | SYSTOLIC BLOOD PRESSURE: 122 MMHG | HEIGHT: 65 IN | WEIGHT: 201.2 LBS

## 2019-11-04 DIAGNOSIS — M53.3 PAIN OF LEFT SACROILIAC JOINT: Primary | ICD-10-CM

## 2019-11-04 PROCEDURE — 99213 OFFICE O/P EST LOW 20 MIN: CPT | Performed by: PHYSICAL MEDICINE & REHABILITATION

## 2019-11-04 ASSESSMENT — MIFFLIN-ST. JEOR: SCORE: 1398.52

## 2019-11-04 NOTE — PROGRESS NOTES
Sports Medicine Clinic Visit    PCP: Chanell Nelson    CC: Patient presents with:  Left Hip - Pain      HPI:  Mya Hui is a 79 year old female who is seen as a self referral.   She notes left posterior hip/glute pain that began 2 weeks ago and worsened yesterday without injury or falls. She notes intermittently it radiates to the posterior thigh. She notes pain over the left glute. She notes it was radiate yesterday into the left side of the abdomen. She notes pain is constant despite positioning. She rates the pain at a 10+/10 at its worst and a 10/10 currently.  Symptoms are relieved with nothing - was able to get comfortable lying down last night for a few hours.  Symptoms are worsened by sitting, standing and walking. She endorses weakness.   She denies swelling, bruising, popping, grinding, catching and locking.  Other treatment has included Tylenol, lidocaine patches and Flexeril.  She has had SI injections in the past and this has helped this pain in the past. Her last injection was with Dr. Mcclure on 4/11/19.     Review of Systems:  Musculoskeletal: as above  Remainder of review of systems is negative including constitutional, eyes, ENT, CV, pulmonary, GI, , endocrine, skin, hematologic, and neurologic except as noted in HPI or medical history.    History reviewed. No pertinent past surgical/medical/family/social history other than as mentioned in HPI.    Patient Active Problem List   Diagnosis     Advance Care Planning     Arthritis     Nevus     Asymptomatic carotid artery stenosis     Hyperlipidemia with target LDL less than 100     History of renal calculi     Sciatica of right side     Hip pain     Hearing aid worn     Osteoarthritis of hip     DDD (degenerative disc disease), lumbar     OAB (overactive bladder)     Essential hypertension with goal blood pressure less than 140/90     Hypothyroidism, unspecified type     Insomnia, unspecified     Type 2 diabetes mellitus without  complication, without long-term current use of insulin (H)     Cataract, bilateral     Primary osteoarthritis involving multiple joints     Chronic right shoulder pain     Right shoulder pain, unspecified chronicity     Dry mouth     Rotator cuff arthropathy, right     Sliding hiatal hernia     Calculus of gallbladder without cholecystitis     Gastroesophageal reflux disease, esophagitis presence not specified     S/P laparoscopic cholecystectomy     Toe anomaly     Cherry angioma     Urinary incontinence, unspecified type     Bilateral lower extremity edema     Past Medical History:   Diagnosis Date     Acute cholecystitis 3/23/2018     Acute kidney injury (H) 9/10/2017     Arthritis      Dehydration 9/4/2017     Diabetes type 2, controlled (H)      Elevated lactic acid level 9/10/2017     GERD (gastroesophageal reflux disease)      History of renal calculi      HTN, goal below 140/90      Hyperlipidaemia LDL goal < 100      Hypokalemia 9/3/2017     Hypothyroid      Insomnia      Left carotid stenosis      Migraine      Motion sickness      PONV (postoperative nausea and vomiting)      Sciatica of right side 6/28/2013     Past Surgical History:   Procedure Laterality Date     BUNIONECTOMY      Right foot     CATARACT IOL, RT/LT Bilateral 2017     COLONOSCOPY  7/12/2013    Procedure: COLONOSCOPY;  Colonoscopy;  Surgeon: Giovany Espinoza MD;  Location: [facility]     COLONOSCOPY N/A 1/31/2019    Procedure: Combined Colonoscopy, Single Or Multiple Biopsy/Polypectomy By Biopsy;  Surgeon: Efren Osorio MD;  Location: [facility] OR     COLONOSCOPY WITH CO2 INSUFFLATION N/A 1/31/2019    Procedure: COLONOSCOPY WITH CO2 INSUFFLATION;  Surgeon: Efren Osorio MD;  Location: [facility] OR     FRACTURE TX, ANKLE RT/LT      Left ankle     HC CYSTOURETHROSCOPY W/ URETEROSCOPY &/OR PYELOSCOPY; W/ LITHOTRIPSY       HC REVISE MEDIAN N/CARPAL TUNNEL SURG      Right wrist     INJECT EPIDURAL LUMBAR Right 12/27/2018     Procedure: INJECT EPIDURAL LUMBAR right foraminal narrowing severe at L4-L5 and moderate at L5-S1;  Surgeon: Gilbert Mcclure MD;  Location: [facility]     INJECT JOINT SACROILIAC  4/10/2014    Procedure: INJECT JOINT SACROILIAC;  Sacroiliac Joint Injection;  Surgeon: Gilbert Mcclure MD;  Location: [facility]     INJECT JOINT SACROILIAC Left 4/11/2019    Procedure: INJECT JOINT SACROILIAC- LEFT;  Surgeon: Gilbert Mcclure MD;  Location: [facility]     LAPAROSCOPIC CHOLECYSTECTOMY N/A 3/24/2018    Procedure: LAPAROSCOPIC CHOLECYSTECTOMY;  Laparoscopic Cholecystectomy;  Surgeon: Berry Allen DO;  Location: [facility] OR     Family History   Problem Relation Age of Onset     Hypertension Brother      Cerebrovascular Disease Brother      Diabetes Father      Cardiovascular Father      Diabetes Brother         Borderline     Breast Cancer Mother      Diabetes Brother      Blood Disease Brother      Cardiovascular Brother         MI     Social History     Socioeconomic History     Marital status:      Spouse name: Not on file     Number of children: Not on file     Years of education: Not on file     Highest education level: Not on file   Occupational History     Employer: RETIRED     Comment: [employer] part time   Social Needs     Financial resource strain: Not on file     Food insecurity:     Worry: Not on file     Inability: Not on file     Transportation needs:     Medical: Not on file     Non-medical: Not on file   Tobacco Use     Smoking status: Never Smoker     Smokeless tobacco: Never Used   Substance and Sexual Activity     Alcohol use: Yes     Drug use: No     Sexual activity: Not Currently   Lifestyle     Physical activity:     Days per week: Not on file     Minutes per session: Not on file     Stress: Not on file   Relationships     Social connections:     Talks on phone: Not on file     Gets together: Not on file     Attends Hoahaoism service: Not on file     Active member of club or organization: Not on  "file     Attends meetings of clubs or organizations: Not on file     Relationship status: Not on file     Intimate partner violence:     Fear of current or ex partner: Not on file     Emotionally abused: Not on file     Physically abused: Not on file     Forced sexual activity: Not on file   Other Topics Concern     Parent/sibling w/ CABG, MI or angioplasty before 65F 55M? Not Asked   Social History Narrative     Not on file         Current Outpatient Medications   Medication     ACCU-CHEK COMPACT PLUS test strip     acetaminophen (TYLENOL) 325 MG tablet     blood glucose (NO BRAND SPECIFIED) lancets standard     blood glucose monitoring (SOFTCLIX) lancets     CARTIA  MG 24 hr capsule     cholestyramine (QUESTRAN) 4 GM/DOSE powder     hydrochlorothiazide (HYDRODIURIL) 12.5 MG tablet     levothyroxine (SYNTHROID/LEVOTHROID) 75 MCG tablet     lisinopril (PRINIVIL/ZESTRIL) 40 MG tablet     mirabegron (MYRBETRIQ) 50 MG 24 hr tablet     Multiple Vitamins-Minerals (MULTIVITAL PO)     order for DME     oxybutynin ER (DITROPAN-XL) 10 MG 24 hr tablet     pantoprazole (PROTONIX) 40 MG EC tablet     potassium chloride ER (K-TAB/KLOR-CON) 10 MEQ CR tablet     pravastatin (PRAVACHOL) 40 MG tablet     Current Facility-Administered Medications   Medication     triamcinolone (KENALOG-40) injection 40 mg     Allergies   Allergen Reactions     Codeine Nausea     Fentanyl Nausea and Vomiting     VERY sensitive to most narcotics if not all.         Objective:  /74   Ht 1.651 m (5' 5\")   Wt 91.3 kg (201 lb 3.2 oz)   BMI 33.48 kg/m      General: Alert and in mild distress    Head: Normocephalic, atraumatic  Eyes: no scleral icterus or conjunctival erythema   Oropharynx:  Mucous membranes moist  Skin: no erythema, petechiae, or jaundice  Resp: normal respiratory effort without conversational dyspnea   Psych: normal mood and affect    Gait: Antalgic  Musculoskeletal:  -Tender over the left SI joint    Radiology:  EXAM: " LUMBAR SPINE MRI WITHOUT CONTRAST  CLINICAL INFORMATION: 78-year-old female with chronic right-sided low back pain radiating to the right hip and leg.   TECHNICAL INFORMATION: 1.5 Denisha MRI was utilized to obtain sagittal T1 ANGELINA, T2 ANEGLINA, STIR; and selected level axial T1 and T2 ANGELINA images of the lumbar spine without intravenous contrast.  SEDATION: None  COMPARISON: No previous images of the lumbar spine are available for comparison.  FINDINGS: For the purposes of this exam, 5 lumbar-type vertebral bodies are described with a transitional lumbosacral segment designated S1. There is a formed S1-2 disc. Left S1 transverse process is fused to the sacrum. Right S1 transverse process touches the sacrum. There is normal lumbar lordosis and moderate, rotatory S-shaped scoliosis apex left at L4-5 and apex right at the thoracolumbar junction. No acute fracture, compression deformity or pars defect. No bone marrow infiltration or destructive bony lesion. The conus is normal in size, shape and signal characteristics, terminating at L2. Nerve roots of the cauda equina are unremarkable.   L5-S1: 5 mm anterolisthesis. Mild disc height loss with disc desiccation and uncovered, mildly bulging disc flattening the ventral thecal sac. No spinal canal stenosis. Mild right subarticular recess narrowing. Disc and osteophytes cause moderate right/mild left foraminal narrowing with mild impingement of the right L5 ganglion (series 3 images 7 and 8). Advanced hypertrophic facet arthropathy bilaterally with mild bony/perifacet edema.  L4-5: Moderate disc degeneration with asymmetric loss of disc height to the right and prominent right lateral osteophytes. Mild broad-based disc bulge. No spinal canal stenosis. Disc and osteophytes severely narrow the right neural foramen with impingement of the right L4 ganglion (series 3 images 8 and 9). Advanced right/mild left facet arthropathy.  L3-4: 10 mm right lateral listhesis of L3 on L4. Advanced  disc degeneration with near complete loss of disc height, mild type II reactive endplate changes/irregularity, marginal osteophytes and mild concentric disc bulge. Superimposed, chronic-appearing 2 mm AP central disc extrusion dissecting 6 mm cranially. There is moderately severe spinal canal stenosis with crowding of the roots of the cauda equina and left greater than right subarticular recess narrowing with impingement of the transiting left L4 root (series 6 image 23). Disc and osteophytes cause severe left/moderate right foraminal narrowing with L3 ganglionic impingement left (series 3 images 12 and 13) and abutment right. Advanced bilateral facet arthropathy.  L2-3, L1-2: Preserved disc height with disc desiccation and concentric disc bulge at both levels. No spinal canal or foraminal stenosis. Mild facet arthropathy at L2-3.  T12-L1: Mild disc height loss with disc desiccation, marginal osteophytes and concentric disc bulge. Superimposed 3 mm AP central disc extrusion dissecting 4 mm both cranially and caudally. No spinal canal stenosis. Moderate left/mild right facet arthropathy, causing mild left foraminal narrowing.  Moderate degenerative changes at the sacroiliac joints. Images through the sacrum and iliac bones are otherwise unremarkable. Moderate to advanced posterior paraspinal muscle atrophy. Visualized abdominal aorta, IVC and kidneys are unremarkable. Incidentally noted 1 cm left ovarian cyst.   CONCLUSION: Extensive disc and facet degeneration in the lumbar spine with moderate S-shaped scoliosis and with specific findings as follows:  1. Right foraminal narrowing severe at L4-5 and moderate at L5-S1 with impingement of the right L4 right L5 ganglia.  2. At L3-4, advanced disc degeneration and 10 mm right lateral listhesis of L3 on L4. Disc bulge/extrusion and degenerative facets cause moderately severe spinal canal stenosis and subarticular recess narrowing with impingement of the transiting left L4  root. Severe left/moderate right foraminal narrowing with impingement of the left L3 ganglion.  3. Shallow disc extrusion at T12-L1 and disc bulging at L1-2 and L2-3, without exiting nerve root impingement or central canal stenosis.  4. Degenerative facet arthropathy advanced from L3-4 through L5-S1 and mild to moderate at other levels, with associated inflammatory changes at the L5-S1 facet joints.  5. There is no evidence of fracture, infection, tumor or arachnoiditis.  DTW       Electronically signed on 12/13/2018 6:35:00 AM by Ibrahima Joiner M.D.       Assessment:  1. Pain of left sacroiliac joint        Plan:  Discussed the assessment with the patient and developed a plan together:  -Left SI joint steroid injection ordered.  Scheduled for tomorrow at 8:15am. Check in at main registration at [facility] - stop drinking 1 hour prior to check in.   Cyclobenzaprine (Flexeril). Taking this medication may cause sedation. Do not take prior to driving  -Ice or heat 15-20 minutes as needed (Avoid sleeping on a heating pad or ice)    -Follow up: As needed if symptoms fail to improve or worsen. Please call with any questions or concerns.       Princess Cohen MD, CAQ Sports Medicine  [facility]

## 2019-11-04 NOTE — LETTER
11/4/2019         RE: Mya Hui  [address]  [facility] [address]        Dear Colleague,    Thank you for referring your patient, Mya Hui, to the [facility]. Please see a copy of my visit note below.    Sports Medicine Clinic Visit    PCP: Chanell Nelson    CC: Patient presents with:  Left Hip - Pain      HPI:  Mya Hui is a 79 year old female who is seen as a self referral.   She notes left posterior hip/glute pain that began 2 weeks ago and worsened yesterday without injury or falls. She notes intermittently it radiates to the posterior thigh. She notes pain over the left glute. She notes it was radiate yesterday into the left side of the abdomen. She notes pain is constant despite positioning. She rates the pain at a 10+/10 at its worst and a 10/10 currently.  Symptoms are relieved with nothing - was able to get comfortable lying down last night for a few hours.  Symptoms are worsened by sitting, standing and walking. She endorses weakness.   She denies swelling, bruising, popping, grinding, catching and locking.  Other treatment has included Tylenol, lidocaine patches and Flexeril.  She has had SI injections in the past and this has helped this pain in the past. Her last injection was with Dr. Mcclure on 4/11/19.     Review of Systems:  Musculoskeletal: as above  Remainder of review of systems is negative including constitutional, eyes, ENT, CV, pulmonary, GI, , endocrine, skin, hematologic, and neurologic except as noted in HPI or medical history.    History reviewed. No pertinent past surgical/medical/family/social history other than as mentioned in HPI.    Patient Active Problem List   Diagnosis     Advance Care Planning     Arthritis     Nevus     Asymptomatic carotid artery stenosis     Hyperlipidemia with target LDL less than 100     History of renal calculi     Sciatica of right side     Hip pain     Hearing aid worn     Osteoarthritis of hip      DDD (degenerative disc disease), lumbar     OAB (overactive bladder)     Essential hypertension with goal blood pressure less than 140/90     Hypothyroidism, unspecified type     Insomnia, unspecified     Type 2 diabetes mellitus without complication, without long-term current use of insulin (H)     Cataract, bilateral     Primary osteoarthritis involving multiple joints     Chronic right shoulder pain     Right shoulder pain, unspecified chronicity     Dry mouth     Rotator cuff arthropathy, right     Sliding hiatal hernia     Calculus of gallbladder without cholecystitis     Gastroesophageal reflux disease, esophagitis presence not specified     S/P laparoscopic cholecystectomy     Toe anomaly     Cherry angioma     Urinary incontinence, unspecified type     Bilateral lower extremity edema     Past Medical History:   Diagnosis Date     Acute cholecystitis 3/23/2018     Acute kidney injury (H) 9/10/2017     Arthritis      Dehydration 9/4/2017     Diabetes type 2, controlled (H)      Elevated lactic acid level 9/10/2017     GERD (gastroesophageal reflux disease)      History of renal calculi      HTN, goal below 140/90      Hyperlipidaemia LDL goal < 100      Hypokalemia 9/3/2017     Hypothyroid      Insomnia      Left carotid stenosis      Migraine      Motion sickness      PONV (postoperative nausea and vomiting)      Sciatica of right side 6/28/2013     Past Surgical History:   Procedure Laterality Date     BUNIONECTOMY      Right foot     CATARACT IOL, RT/LT Bilateral 2017     COLONOSCOPY  7/12/2013    Procedure: COLONOSCOPY;  Colonoscopy;  Surgeon: Giovany Espinoza MD;  [facility]     COLONOSCOPY N/A 1/31/2019    Procedure: Combined Colonoscopy, Single Or Multiple Biopsy/Polypectomy By Biopsy;  Surgeon: Efren Osorio MD;  Location: [facility]     COLONOSCOPY WITH CO2 INSUFFLATION N/A 1/31/2019    Procedure: COLONOSCOPY WITH CO2 INSUFFLATION;  Surgeon: Efren Osorio MD;   [facility]: [facility]     FRACTURE TX, ANKLE RT/LT      Left ankle     HC CYSTOURETHROSCOPY W/ URETEROSCOPY &/OR PYELOSCOPY; W/ LITHOTRIPSY       HC REVISE MEDIAN N/CARPAL TUNNEL SURG      Right wrist     INJECT EPIDURAL LUMBAR Right 12/27/2018    Procedure: INJECT EPIDURAL LUMBAR right foraminal narrowing severe at L4-L5 and moderate at L5-S1;  Surgeon: Gilbert Mcclure MD;  [facility]     INJECT JOINT SACROILIAC  4/10/2014    Procedure: INJECT JOINT SACROILIAC;  Sacroiliac Joint Injection;  Surgeon: Gilbert Mcclure MD;  [facility]     INJECT JOINT SACROILIAC Left 4/11/2019    Procedure: INJECT JOINT SACROILIAC- LEFT;  Surgeon: Gilbert Mcclure MD;  [facility]     LAPAROSCOPIC CHOLECYSTECTOMY N/A 3/24/2018    Procedure: LAPAROSCOPIC CHOLECYSTECTOMY;  Laparoscopic Cholecystectomy;  Surgeon: Berry Allen DO;  [facility]     Family History   Problem Relation Age of Onset     Hypertension Brother      Cerebrovascular Disease Brother      Diabetes Father      Cardiovascular Father      Diabetes Brother         Borderline     Breast Cancer Mother      Diabetes Brother      Blood Disease Brother      Cardiovascular Brother         MI     Social History     Socioeconomic History     Marital status:      Spouse name: Not on file     Number of children: Not on file     Years of education: Not on file     Highest education level: Not on file   Occupational History     Employer: RETIRED     Comment: 's office part time   Social Needs     Financial resource strain: Not on file     Food insecurity:     Worry: Not on file     Inability: Not on file     Transportation needs:     Medical: Not on file     Non-medical: Not on file   Tobacco Use     Smoking status: Never Smoker     Smokeless tobacco: Never Used   Substance and Sexual Activity     Alcohol use: Yes     Drug use: No     Sexual activity: Not Currently   Lifestyle     Physical activity:     Days per week: Not on file     Minutes per  "session: Not on file     Stress: Not on file   Relationships     Social connections:     Talks on phone: Not on file     Gets together: Not on file     Attends Jehovah's witness service: Not on file     Active member of club or organization: Not on file     Attends meetings of clubs or organizations: Not on file     Relationship status: Not on file     Intimate partner violence:     Fear of current or ex partner: Not on file     Emotionally abused: Not on file     Physically abused: Not on file     Forced sexual activity: Not on file   Other Topics Concern     Parent/sibling w/ CABG, MI or angioplasty before 65F 55M? Not Asked   Social History Narrative     Not on file         Current Outpatient Medications   Medication     ACCU-CHEK COMPACT PLUS test strip     acetaminophen (TYLENOL) 325 MG tablet     blood glucose (NO BRAND SPECIFIED) lancets standard     blood glucose monitoring (SOFTCLIX) lancets     CARTIA  MG 24 hr capsule     cholestyramine (QUESTRAN) 4 GM/DOSE powder     hydrochlorothiazide (HYDRODIURIL) 12.5 MG tablet     levothyroxine (SYNTHROID/LEVOTHROID) 75 MCG tablet     lisinopril (PRINIVIL/ZESTRIL) 40 MG tablet     mirabegron (MYRBETRIQ) 50 MG 24 hr tablet     Multiple Vitamins-Minerals (MULTIVITAL PO)     order for DME     oxybutynin ER (DITROPAN-XL) 10 MG 24 hr tablet     pantoprazole (PROTONIX) 40 MG EC tablet     potassium chloride ER (K-TAB/KLOR-CON) 10 MEQ CR tablet     pravastatin (PRAVACHOL) 40 MG tablet     Current Facility-Administered Medications   Medication     triamcinolone (KENALOG-40) injection 40 mg     Allergies   Allergen Reactions     Codeine Nausea     Fentanyl Nausea and Vomiting     VERY sensitive to most narcotics if not all.         Objective:  /74   Ht 1.651 m (5' 5\")   Wt 91.3 kg (201 lb 3.2 oz)   BMI 33.48 kg/m       General: Alert and in mild distress    Head: Normocephalic, atraumatic  Eyes: no scleral icterus or conjunctival erythema   Oropharynx:  Mucous " membranes moist  Skin: no erythema, petechiae, or jaundice  Resp: normal respiratory effort without conversational dyspnea   Psych: normal mood and affect    Gait: Antalgic  Musculoskeletal:  -Tender over the left SI joint    Radiology:  EXAM: LUMBAR SPINE MRI WITHOUT CONTRAST  CLINICAL INFORMATION: 78-year-old female with chronic right-sided low back pain radiating to the right hip and leg.   TECHNICAL INFORMATION: 1.5 Denisha MRI was utilized to obtain sagittal T1 ANGELINA, T2 ANGELINA, STIR; and selected level axial T1 and T2 ANGELINA images of the lumbar spine without intravenous contrast.  SEDATION: None  COMPARISON: No previous images of the lumbar spine are available for comparison.  FINDINGS: For the purposes of this exam, 5 lumbar-type vertebral bodies are described with a transitional lumbosacral segment designated S1. There is a formed S1-2 disc. Left S1 transverse process is fused to the sacrum. Right S1 transverse process touches the sacrum. There is normal lumbar lordosis and moderate, rotatory S-shaped scoliosis apex left at L4-5 and apex right at the thoracolumbar junction. No acute fracture, compression deformity or pars defect. No bone marrow infiltration or destructive bony lesion. The conus is normal in size, shape and signal characteristics, terminating at L2. Nerve roots of the cauda equina are unremarkable.   L5-S1: 5 mm anterolisthesis. Mild disc height loss with disc desiccation and uncovered, mildly bulging disc flattening the ventral thecal sac. No spinal canal stenosis. Mild right subarticular recess narrowing. Disc and osteophytes cause moderate right/mild left foraminal narrowing with mild impingement of the right L5 ganglion (series 3 images 7 and 8). Advanced hypertrophic facet arthropathy bilaterally with mild bony/perifacet edema.  L4-5: Moderate disc degeneration with asymmetric loss of disc height to the right and prominent right lateral osteophytes. Mild broad-based disc bulge. No spinal canal  stenosis. Disc and osteophytes severely narrow the right neural foramen with impingement of the right L4 ganglion (series 3 images 8 and 9). Advanced right/mild left facet arthropathy.  L3-4: 10 mm right lateral listhesis of L3 on L4. Advanced disc degeneration with near complete loss of disc height, mild type II reactive endplate changes/irregularity, marginal osteophytes and mild concentric disc bulge. Superimposed, chronic-appearing 2 mm AP central disc extrusion dissecting 6 mm cranially. There is moderately severe spinal canal stenosis with crowding of the roots of the cauda equina and left greater than right subarticular recess narrowing with impingement of the transiting left L4 root (series 6 image 23). Disc and osteophytes cause severe left/moderate right foraminal narrowing with L3 ganglionic impingement left (series 3 images 12 and 13) and abutment right. Advanced bilateral facet arthropathy.  L2-3, L1-2: Preserved disc height with disc desiccation and concentric disc bulge at both levels. No spinal canal or foraminal stenosis. Mild facet arthropathy at L2-3.  T12-L1: Mild disc height loss with disc desiccation, marginal osteophytes and concentric disc bulge. Superimposed 3 mm AP central disc extrusion dissecting 4 mm both cranially and caudally. No spinal canal stenosis. Moderate left/mild right facet arthropathy, causing mild left foraminal narrowing.  Moderate degenerative changes at the sacroiliac joints. Images through the sacrum and iliac bones are otherwise unremarkable. Moderate to advanced posterior paraspinal muscle atrophy. Visualized abdominal aorta, IVC and kidneys are unremarkable. Incidentally noted 1 cm left ovarian cyst.   CONCLUSION: Extensive disc and facet degeneration in the lumbar spine with moderate S-shaped scoliosis and with specific findings as follows:  1. Right foraminal narrowing severe at L4-5 and moderate at L5-S1 with impingement of the right L4 right L5 ganglia.  2. At  L3-4, advanced disc degeneration and 10 mm right lateral listhesis of L3 on L4. Disc bulge/extrusion and degenerative facets cause moderately severe spinal canal stenosis and subarticular recess narrowing with impingement of the transiting left L4 root. Severe left/moderate right foraminal narrowing with impingement of the left L3 ganglion.  3. Shallow disc extrusion at T12-L1 and disc bulging at L1-2 and L2-3, without exiting nerve root impingement or central canal stenosis.  4. Degenerative facet arthropathy advanced from L3-4 through L5-S1 and mild to moderate at other levels, with associated inflammatory changes at the L5-S1 facet joints.  5. There is no evidence of fracture, infection, tumor or arachnoiditis.  DTW       Electronically signed on 12/13/2018 6:35:00 AM by Ibrahima Joiner M.D.       Assessment:  1. Pain of left sacroiliac joint        Plan:  Discussed the assessment with the patient and developed a plan together:  -Left SI joint steroid injection ordered.  Scheduled for tomorrow at 8:15am. Check in at main registration at [facility] - stop drinking 1 hour prior to check in.   Cyclobenzaprine (Flexeril). Taking this medication may cause sedation. Do not take prior to driving  -Ice or heat 15-20 minutes as needed (Avoid sleeping on a heating pad or ice)    -Follow up: As needed if symptoms fail to improve or worsen. Please call with any questions or concerns.       Princess Cohen MD, [facility] Sports Medicine  [facility]    Again, thank you for allowing me to participate in the care of your patient.        Sincerely,        Nereyda Cohen MD

## 2019-11-05 ENCOUNTER — HOSPITAL ENCOUNTER (OUTPATIENT)
Dept: GENERAL RADIOLOGY | Facility: CLINIC | Age: 79
Discharge: HOME | End: 2019-11-05
Attending: PHYSICAL MEDICINE & REHABILITATION | Admitting: PHYSICAL MEDICINE & REHABILITATION
Payer: COMMERCIAL

## 2019-11-05 DIAGNOSIS — M53.3 PAIN OF LEFT SACROILIAC JOINT: ICD-10-CM

## 2019-11-05 PROCEDURE — 25000125 ZZHC RX 250: Performed by: RADIOLOGY

## 2019-11-05 PROCEDURE — 27096 INJECT SACROILIAC JOINT: CPT | Mod: LT

## 2019-11-05 PROCEDURE — 25500064 ZZH RX 255 OP 636: Performed by: RADIOLOGY

## 2019-11-05 PROCEDURE — 25000128 H RX IP 250 OP 636: Performed by: RADIOLOGY

## 2019-11-05 RX ORDER — TRIAMCINOLONE ACETONIDE 40 MG/ML
40 INJECTION, SUSPENSION INTRA-ARTICULAR; INTRAMUSCULAR ONCE
Status: COMPLETED | OUTPATIENT
Start: 2019-11-05 | End: 2019-11-05

## 2019-11-05 RX ORDER — IOPAMIDOL 408 MG/ML
50 INJECTION, SOLUTION INTRAVASCULAR ONCE
Status: COMPLETED | OUTPATIENT
Start: 2019-11-05 | End: 2019-11-05

## 2019-11-05 RX ORDER — BUPIVACAINE HYDROCHLORIDE 2.5 MG/ML
10 INJECTION, SOLUTION EPIDURAL; INFILTRATION; INTRACAUDAL ONCE
Status: COMPLETED | OUTPATIENT
Start: 2019-11-05 | End: 2019-11-05

## 2019-11-05 RX ORDER — LIDOCAINE HYDROCHLORIDE 10 MG/ML
10 INJECTION, SOLUTION EPIDURAL; INFILTRATION; INTRACAUDAL; PERINEURAL ONCE
Status: COMPLETED | OUTPATIENT
Start: 2019-11-05 | End: 2019-11-05

## 2019-11-05 RX ADMIN — BUPIVACAINE HYDROCHLORIDE 1 ML: 2.5 INJECTION, SOLUTION EPIDURAL; INFILTRATION; INTRACAUDAL at 08:58

## 2019-11-05 RX ADMIN — IOPAMIDOL 1 ML: 408 INJECTION, SOLUTION INTRAVASCULAR at 08:57

## 2019-11-05 RX ADMIN — TRIAMCINOLONE ACETONIDE 40 MG: 40 INJECTION, SUSPENSION INTRA-ARTICULAR; INTRAMUSCULAR at 08:58

## 2019-11-05 RX ADMIN — LIDOCAINE HYDROCHLORIDE 1 ML: 10 INJECTION, SOLUTION EPIDURAL; INFILTRATION; INTRACAUDAL; PERINEURAL at 08:51

## 2019-11-05 NOTE — TELEPHONE ENCOUNTER
No, I do not do skin biopsies.  Did she try calling the dermatology clinic in [place]?.  They are also dermatology clinics around [facility].  Please help her find one that is closer to her, since she does not want to have to drive down [facility].

## 2019-11-05 NOTE — PROGRESS NOTES
Appropriate assistive devices provided during their visit. na(Yes, No, N/A) walker (list device)    Exam table and/or cart  placed in the lowest position. yes (Yes, No, N/A)    Brakes on tables/carts/wheelchairs used at all times. yes (Yes, No, N/A)    Non slip footwear applied. na (Yes, No, NA)    Patient was accompanied by staff throughout visit. yes (Yes, No, N/A)    Equipment safety straps used. na (Yes, No, N/A)    Assist with toileting. na (Yes, No, N/A)

## 2019-11-05 NOTE — TELEPHONE ENCOUNTER
I talked to Mya and offered [facility] she has a daughter that lives there. Transferred her to [place] specialty schedulers to schedule.    Order placed and pended. Please sign order if appropriate and close encounter.

## 2019-11-09 NOTE — PROGRESS NOTES
Sports Medicine Clinic Visit - Interim History November 9, 2019      PCP: Chanell Nelson    Mya Hui is a 79 year old female who is seen in follow up for left SI joint pain.  Since last visit on 11/4/2019 patient has completed her SI joint injection on 11/5/19. She reports that she has had ~80 % relief from the steroid injection.  She rates the pain at a   0/10 currently.  Symptoms are relieved with the steroid injection.  Symptoms are worsened by sitting for too long - she has pain upon standing again. She is wondering if there is anything to resolve the residual pain that she has after sitting.   Specifically, her daughter was wondering about muscle relaxants and prednisone.      She notes her shoulder is doing pretty well. She gets some occasional clicking in the neck and is continuing to do PT and this seems to help.       Review of Systems  Musculoskeletal: as above  Remainder of review of systems is negative including constitutional, eyes, ENT, CV, pulmonary, GI, , endocrine, skin, hematologic, and neurologic except as noted in HPI or medical history.    History reviewed. No pertinent past surgical/medical/family/social history other than as mentioned in HPI.    Patient Active Problem List   Diagnosis     Advance Care Planning     Arthritis     Nevus     Asymptomatic carotid artery stenosis     Hyperlipidemia with target LDL less than 100     History of renal calculi     Sciatica of right side     Hip pain     Hearing aid worn     Osteoarthritis of hip     DDD (degenerative disc disease), lumbar     OAB (overactive bladder)     Essential hypertension with goal blood pressure less than 140/90     Hypothyroidism, unspecified type     Insomnia, unspecified     Type 2 diabetes mellitus without complication, without long-term current use of insulin (H)     Cataract, bilateral     Primary osteoarthritis involving multiple joints     Chronic right shoulder pain     Right shoulder pain, unspecified  chronicity     Dry mouth     Rotator cuff arthropathy, right     Sliding hiatal hernia     Calculus of gallbladder without cholecystitis     Gastroesophageal reflux disease, esophagitis presence not specified     S/P laparoscopic cholecystectomy     Toe anomaly     Cherry angioma     Urinary incontinence, unspecified type     Bilateral lower extremity edema     Past Medical History:   Diagnosis Date     Acute cholecystitis 3/23/2018     Acute kidney injury (H) 9/10/2017     Arthritis      Dehydration 9/4/2017     Diabetes type 2, controlled (H)      Elevated lactic acid level 9/10/2017     GERD (gastroesophageal reflux disease)      History of renal calculi      HTN, goal below 140/90      Hyperlipidaemia LDL goal < 100      Hypokalemia 9/3/2017     Hypothyroid      Insomnia      Left carotid stenosis      Migraine      Motion sickness      PONV (postoperative nausea and vomiting)      Sciatica of right side 6/28/2013     Past Surgical History:   Procedure Laterality Date     BUNIONECTOMY      Right foot     CATARACT IOL, RT/LT Bilateral 2017     COLONOSCOPY  7/12/2013    Procedure: COLONOSCOPY;  Colonoscopy;  Surgeon: Giovany Espinoza MD;  [facility]     COLONOSCOPY N/A 1/31/2019    Procedure: Combined Colonoscopy, Single Or Multiple Biopsy/Polypectomy By Biopsy;  Surgeon: Efren Osorio MD;  [facility]     COLONOSCOPY WITH CO2 INSUFFLATION N/A 1/31/2019    Procedure: COLONOSCOPY WITH CO2 INSUFFLATION;  Surgeon: Efren Osorio MD;  [facility]     FRACTURE TX, ANKLE RT/LT      Left ankle     HC CYSTOURETHROSCOPY W/ URETEROSCOPY &/OR PYELOSCOPY; W/ LITHOTRIPSY       HC REVISE MEDIAN N/CARPAL TUNNEL SURG      Right wrist     INJECT EPIDURAL LUMBAR Right 12/27/2018    Procedure: INJECT EPIDURAL LUMBAR right foraminal narrowing severe at L4-L5 and moderate at L5-S1;  Surgeon: Gilbert Mcclure MD;  [facility]     INJECT JOINT SACROILIAC  4/10/2014    Procedure: INJECT  JOINT SACROILIAC;  Sacroiliac Joint Injection;  Surgeon: Gilbert Mcclure MD;  [facility]     INJECT JOINT SACROILIAC Left 4/11/2019    Procedure: INJECT JOINT SACROILIAC- LEFT;  Surgeon: Gilbert Mcclure MD;  [facility]     LAPAROSCOPIC CHOLECYSTECTOMY N/A 3/24/2018    Procedure: LAPAROSCOPIC CHOLECYSTECTOMY;  Laparoscopic Cholecystectomy;  Surgeon: Berry Allen DO;  [facility]     Family History   Problem Relation Age of Onset     Hypertension Brother      Cerebrovascular Disease Brother      Diabetes Father      Cardiovascular Father      Diabetes Brother         Borderline     Breast Cancer Mother      Diabetes Brother      Blood Disease Brother      Cardiovascular Brother         MI     Social History     Socioeconomic History     Marital status:      Spouse name: Not on file     Number of children: Not on file     Years of education: Not on file     Highest education level: Not on file   Occupational History     Employer: RETIRED     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   Social Needs     Financial resource strain: Not on file     Food insecurity:     Worry: Not on file     Inability: Not on file     Transportation needs:     Medical: Not on file     Non-medical: Not on file   Tobacco Use     Smoking status: Never Smoker     Smokeless tobacco: Never Used   Substance and Sexual Activity     Alcohol use: Yes     Drug use: No     Sexual activity: Not Currently   Lifestyle     Physical activity:     Days per week: Not on file     Minutes per session: Not on file     Stress: Not on file   Relationships     Social connections:     Talks on phone: Not on file     Gets together: Not on file     Attends Holiness service: Not on file     Active member of club or organization: Not on file     Attends meetings of clubs or organizations: Not on file     Relationship status: Not on file     Intimate partner violence:     Fear of current or ex partner: Not on file     Emotionally abused: Not  "on file     Physically abused: Not on file     Forced sexual activity: Not on file   Other Topics Concern     Parent/sibling w/ CABG, MI or angioplasty before 65F 55M? Not Asked   Social History Narrative     Not on file       She is retired.     Current Outpatient Medications   Medication     ACCU-CHEK COMPACT PLUS test strip     acetaminophen (TYLENOL) 325 MG tablet     blood glucose (NO BRAND SPECIFIED) lancets standard     blood glucose monitoring (SOFTCLIX) lancets     CARTIA  MG 24 hr capsule     cholestyramine (QUESTRAN) 4 GM/DOSE powder     hydrochlorothiazide (HYDRODIURIL) 12.5 MG tablet     levothyroxine (SYNTHROID/LEVOTHROID) 75 MCG tablet     lisinopril (PRINIVIL/ZESTRIL) 40 MG tablet     mirabegron (MYRBETRIQ) 50 MG 24 hr tablet     Multiple Vitamins-Minerals (MULTIVITAL PO)     order for DME     oxybutynin ER (DITROPAN-XL) 10 MG 24 hr tablet     pantoprazole (PROTONIX) 40 MG EC tablet     potassium chloride ER (K-TAB/KLOR-CON) 10 MEQ CR tablet     pravastatin (PRAVACHOL) 40 MG tablet     Current Facility-Administered Medications   Medication     triamcinolone (KENALOG-40) injection 40 mg     Allergies   Allergen Reactions     Codeine Nausea     Fentanyl Nausea and Vomiting     VERY sensitive to most narcotics if not all.         Objective:  BP (!) 149/67   Pulse 111   Ht 1.651 m (5' 5\")   Wt 89.6 kg (197 lb 8 oz)   BMI 32.87 kg/m      General: Alert and in no distress    Head: Normocephalic, atraumatic  Eyes: no scleral icterus or conjunctival erythema   Oropharynx:  Mucous membranes moist  Skin: no erythema, petechiae, or jaundice  Resp: normal respiratory effort without conversational dyspnea   Psych: normal mood and affect    Gait: Uses a wheeled walker  Musculoskeletal:  -No tenderness over the bilateral SI joints    Radiology:  No new imaging obtained today    Assessment:  1. Pain of left sacroiliac joint        Plan:  Discussed the assessment with the patient and developed a plan " together:  -Rehab: [facility] - [phone]. Please do 5-6 days of exercises per week between formal sessions and the home exercises they provide.  -Consider using a doughnut cushion for pain while sitting.  -When sitting for long periods of time, take breaks often to get up and walk around.    -Soft back brace as desired for support for prolonged standing.    -her daughter wanted to know about prescribing muscle relaxants or prednisone.  Would not recommend muscle relaxants in this age group due to sedative effect, polypharmacy, and increased fall risk.  In addition, would not recommend oral steroids at this time given she just had a steroid injection.  Mya expressed understanding.    -Ice or heat 15-20 minutes as needed (Avoid sleeping on a heating pad or ice)  -Acetaminophen (Tylenol) up to 1000mg every 4-6 hours as needed (Maximum of 3000mg/day)    Follow Up: As needed if symptoms fail to improve or worsen. Please call with any questions or concerns.     Mya to follow up with primary care provider regarding elevated blood pressure      Princess Cohen MD, [facility] Sports Medicine  [facility]

## 2019-11-12 ENCOUNTER — OFFICE VISIT (OUTPATIENT)
Dept: ORTHOPEDICS | Facility: CLINIC | Age: 79
End: 2019-11-12
Payer: COMMERCIAL

## 2019-11-12 VITALS
HEART RATE: 111 BPM | HEIGHT: 65 IN | DIASTOLIC BLOOD PRESSURE: 67 MMHG | WEIGHT: 197.5 LBS | BODY MASS INDEX: 32.9 KG/M2 | SYSTOLIC BLOOD PRESSURE: 149 MMHG

## 2019-11-12 DIAGNOSIS — M53.3 PAIN OF LEFT SACROILIAC JOINT: Primary | ICD-10-CM

## 2019-11-12 PROCEDURE — 99213 OFFICE O/P EST LOW 20 MIN: CPT | Performed by: PHYSICAL MEDICINE & REHABILITATION

## 2019-11-12 ASSESSMENT — MIFFLIN-ST. JEOR: SCORE: 1381.73

## 2019-11-12 NOTE — LETTER
11/12/2019         RE: Mya Hui  [address]  [facility] [address]        Dear Colleague,    Thank you for referring your patient, Mya Hui, to the [facility]. Please see a copy of my visit note below.    Sports Medicine Clinic Visit - Interim History November 9, 2019      PCP: Chanell Nelson    Mya Hui is a 79 year old female who is seen in follow up for left SI joint pain.  Since last visit on 11/4/2019 patient has completed her SI joint injection on 11/5/19. She reports that she has had ~80 % relief from the steroid injection.  She rates the pain at a   0/10 currently.  Symptoms are relieved with the steroid injection.  Symptoms are worsened by sitting for too long - she has pain upon standing again. She is wondering if there is anything to resolve the residual pain that she has after sitting.   Specifically, her daughter was wondering about muscle relaxants and prednisone.      She notes her shoulder is doing pretty well. She gets some occasional clicking in the neck and is continuing to do PT and this seems to help.       Review of Systems  Musculoskeletal: as above  Remainder of review of systems is negative including constitutional, eyes, ENT, CV, pulmonary, GI, , endocrine, skin, hematologic, and neurologic except as noted in HPI or medical history.    History reviewed. No pertinent past surgical/medical/family/social history other than as mentioned in HPI.    Patient Active Problem List   Diagnosis     Advance Care Planning     Arthritis     Nevus     Asymptomatic carotid artery stenosis     Hyperlipidemia with target LDL less than 100     History of renal calculi     Sciatica of right side     Hip pain     Hearing aid worn     Osteoarthritis of hip     DDD (degenerative disc disease), lumbar     OAB (overactive bladder)     Essential hypertension with goal blood pressure less than 140/90     Hypothyroidism, unspecified type     Insomnia,  unspecified     Type 2 diabetes mellitus without complication, without long-term current use of insulin (H)     Cataract, bilateral     Primary osteoarthritis involving multiple joints     Chronic right shoulder pain     Right shoulder pain, unspecified chronicity     Dry mouth     Rotator cuff arthropathy, right     Sliding hiatal hernia     Calculus of gallbladder without cholecystitis     Gastroesophageal reflux disease, esophagitis presence not specified     S/P laparoscopic cholecystectomy     Toe anomaly     Cherry angioma     Urinary incontinence, unspecified type     Bilateral lower extremity edema     Past Medical History:   Diagnosis Date     Acute cholecystitis 3/23/2018     Acute kidney injury (H) 9/10/2017     Arthritis      Dehydration 9/4/2017     Diabetes type 2, controlled (H)      Elevated lactic acid level 9/10/2017     GERD (gastroesophageal reflux disease)      History of renal calculi      HTN, goal below 140/90      Hyperlipidaemia LDL goal < 100      Hypokalemia 9/3/2017     Hypothyroid      Insomnia      Left carotid stenosis      Migraine      Motion sickness      PONV (postoperative nausea and vomiting)      Sciatica of right side 6/28/2013     Past Surgical History:   Procedure Laterality Date     BUNIONECTOMY      Right foot     CATARACT IOL, RT/LT Bilateral 2017     COLONOSCOPY  7/12/2013    Procedure: COLONOSCOPY;  Colonoscopy;  Surgeon: Giovany Espinoza MD;  [facility]     COLONOSCOPY N/A 1/31/2019    Procedure: Combined Colonoscopy, Single Or Multiple Biopsy/Polypectomy By Biopsy;  Surgeon: Efren Osorio MD;  [facility]     COLONOSCOPY WITH CO2 INSUFFLATION N/A 1/31/2019    Procedure: COLONOSCOPY WITH CO2 INSUFFLATION;  Surgeon: Efren Osorio MD;  [facility]     FRACTURE TX, ANKLE RT/LT      Left ankle     HC CYSTOURETHROSCOPY W/ URETEROSCOPY &/OR PYELOSCOPY; W/ LITHOTRIPSY       HC REVISE MEDIAN N/CARPAL TUNNEL SURG      Right wrist      INJECT EPIDURAL LUMBAR Right 12/27/2018    Procedure: INJECT EPIDURAL LUMBAR right foraminal narrowing severe at L4-L5 and moderate at L5-S1;  Surgeon: Gilbert Mcclure MD;  [facility]     INJECT JOINT SACROILIAC  4/10/2014    Procedure: INJECT JOINT SACROILIAC;  Sacroiliac Joint Injection;  Surgeon: Gilbert Mcclure MD;  [facility]     INJECT JOINT SACROILIAC Left 4/11/2019    Procedure: INJECT JOINT SACROILIAC- LEFT;  Surgeon: Gilbert Mcclure MD;  [facility]     LAPAROSCOPIC CHOLECYSTECTOMY N/A 3/24/2018    Procedure: LAPAROSCOPIC CHOLECYSTECTOMY;  Laparoscopic Cholecystectomy;  Surgeon: Berry Allen DO;  [facility]     Family History   Problem Relation Age of Onset     Hypertension Brother      Cerebrovascular Disease Brother      Diabetes Father      Cardiovascular Father      Diabetes Brother         Borderline     Breast Cancer Mother      Diabetes Brother      Blood Disease Brother      Cardiovascular Brother         MI     Social History     Socioeconomic History     Marital status:      Spouse name: Not on file     Number of children: Not on file     Years of education: Not on file     Highest education level: Not on file   Occupational History     Employer: RETIRED     Comment: [employer] office part time   Social Needs     Financial resource strain: Not on file     Food insecurity:     Worry: Not on file     Inability: Not on file     Transportation needs:     Medical: Not on file     Non-medical: Not on file   Tobacco Use     Smoking status: Never Smoker     Smokeless tobacco: Never Used   Substance and Sexual Activity     Alcohol use: Yes     Drug use: No     Sexual activity: Not Currently   Lifestyle     Physical activity:     Days per week: Not on file     Minutes per session: Not on file     Stress: Not on file   Relationships     Social connections:     Talks on phone: Not on file     Gets together: Not on file     Attends Judaism service: Not on file      "Active member of club or organization: Not on file     Attends meetings of clubs or organizations: Not on file     Relationship status: Not on file     Intimate partner violence:     Fear of current or ex partner: Not on file     Emotionally abused: Not on file     Physically abused: Not on file     Forced sexual activity: Not on file   Other Topics Concern     Parent/sibling w/ CABG, MI or angioplasty before 65F 55M? Not Asked   Social History Narrative     Not on file       She is retired.     Current Outpatient Medications   Medication     ACCU-CHEK COMPACT PLUS test strip     acetaminophen (TYLENOL) 325 MG tablet     blood glucose (NO BRAND SPECIFIED) lancets standard     blood glucose monitoring (SOFTCLIX) lancets     CARTIA  MG 24 hr capsule     cholestyramine (QUESTRAN) 4 GM/DOSE powder     hydrochlorothiazide (HYDRODIURIL) 12.5 MG tablet     levothyroxine (SYNTHROID/LEVOTHROID) 75 MCG tablet     lisinopril (PRINIVIL/ZESTRIL) 40 MG tablet     mirabegron (MYRBETRIQ) 50 MG 24 hr tablet     Multiple Vitamins-Minerals (MULTIVITAL PO)     order for DME     oxybutynin ER (DITROPAN-XL) 10 MG 24 hr tablet     pantoprazole (PROTONIX) 40 MG EC tablet     potassium chloride ER (K-TAB/KLOR-CON) 10 MEQ CR tablet     pravastatin (PRAVACHOL) 40 MG tablet     Current Facility-Administered Medications   Medication     triamcinolone (KENALOG-40) injection 40 mg     Allergies   Allergen Reactions     Codeine Nausea     Fentanyl Nausea and Vomiting     VERY sensitive to most narcotics if not all.         Objective:  BP (!) 149/67   Pulse 111   Ht 1.651 m (5' 5\")   Wt 89.6 kg (197 lb 8 oz)   BMI 32.87 kg/m       General: Alert and in no distress    Head: Normocephalic, atraumatic  Eyes: no scleral icterus or conjunctival erythema   Oropharynx:  Mucous membranes moist  Skin: no erythema, petechiae, or jaundice  Resp: normal respiratory effort without conversational dyspnea   Psych: normal mood and affect    Gait: Uses a " wheeled walker  Musculoskeletal:  -No tenderness over the bilateral SI joints    Radiology:  No new imaging obtained today    Assessment:  1. Pain of left sacroiliac joint        Plan:  Discussed the assessment with the patient and developed a plan together:  -Rehab: Physical Therapy: [facility] - [phone]. Please do 5-6 days of exercises per week between formal sessions and the home exercises they provide.  -Consider using a doughnut cushion for pain while sitting.  -When sitting for long periods of time, take breaks often to get up and walk around.    -Soft back brace as desired for support for prolonged standing.    -her daughter wanted to know about prescribing muscle relaxants or prednisone.  Would not recommend muscle relaxants in this age group due to sedative effect, polypharmacy, and increased fall risk.  In addition, would not recommend oral steroids at this time given she just had a steroid injection.  Mya expressed understanding.    -Ice or heat 15-20 minutes as needed (Avoid sleeping on a heating pad or ice)  -Acetaminophen (Tylenol) up to 1000mg every 4-6 hours as needed (Maximum of 3000mg/day)    Follow Up: As needed if symptoms fail to improve or worsen. Please call with any questions or concerns.     Mya to follow up with primary care provider regarding elevated blood pressure      Princess Cohen MD, [facility]  [facility]      Again, thank you for allowing me to participate in the care of your patient.        Sincerely,        Nereyda Cohen MD

## 2019-11-12 NOTE — PATIENT INSTRUCTIONS
Today's Plan of Care:  -Rehab: Physical Therapy: [facility] - [phone]. Please do 5-6 days of exercises per week between formal sessions and the home exercises they provide.  -Consider using a doughnut cushion  -Soft back brace as desired for support  -Ice or heat 15-20 minutes as needed (Avoid sleeping on a heating pad or ice)  Acetaminophen (Tylenol) up to 1000mg every 4-6 hours as needed (Maximum of 3000mg/day)    Follow Up: As needed if symptoms fail to improve or worsen. Please call with any questions or concerns.     Mya to follow up with Primary Care provider regarding elevated blood pressure.

## 2019-11-19 ENCOUNTER — HOSPITAL ENCOUNTER (OUTPATIENT)
Dept: PHYSICAL THERAPY | Facility: CLINIC | Age: 79
Setting detail: THERAPIES SERIES
End: 2019-11-19
Attending: PHYSICAL MEDICINE & REHABILITATION
Payer: COMMERCIAL

## 2019-11-19 PROCEDURE — 97110 THERAPEUTIC EXERCISES: CPT | Mod: GP | Performed by: PHYSICAL THERAPIST

## 2019-11-19 PROCEDURE — 97140 MANUAL THERAPY 1/> REGIONS: CPT | Mod: GP | Performed by: PHYSICAL THERAPIST

## 2019-11-20 ENCOUNTER — TELEPHONE (OUTPATIENT)
Dept: FAMILY MEDICINE | Facility: CLINIC | Age: 79
End: 2019-11-20

## 2019-11-20 ENCOUNTER — OFFICE VISIT (OUTPATIENT)
Dept: FAMILY MEDICINE | Facility: CLINIC | Age: 79
End: 2019-11-20
Payer: COMMERCIAL

## 2019-11-20 VITALS
OXYGEN SATURATION: 96 % | SYSTOLIC BLOOD PRESSURE: 136 MMHG | TEMPERATURE: 97.1 F | HEART RATE: 71 BPM | BODY MASS INDEX: 32.52 KG/M2 | WEIGHT: 195.4 LBS | RESPIRATION RATE: 16 BRPM | DIASTOLIC BLOOD PRESSURE: 86 MMHG

## 2019-11-20 DIAGNOSIS — I10 ESSENTIAL HYPERTENSION WITH GOAL BLOOD PRESSURE LESS THAN 140/90: ICD-10-CM

## 2019-11-20 DIAGNOSIS — E11.9 TYPE 2 DIABETES MELLITUS WITHOUT COMPLICATION, WITHOUT LONG-TERM CURRENT USE OF INSULIN (H): Primary | ICD-10-CM

## 2019-11-20 DIAGNOSIS — Z86.19 H/O COLD SORES: ICD-10-CM

## 2019-11-20 DIAGNOSIS — E78.5 HYPERLIPIDEMIA WITH TARGET LDL LESS THAN 100: ICD-10-CM

## 2019-11-20 LAB
ALBUMIN SERPL-MCNC: 3.7 G/DL (ref 3.4–5)
ALP SERPL-CCNC: 65 U/L (ref 40–150)
ALT SERPL W P-5'-P-CCNC: 47 U/L (ref 0–50)
ANION GAP SERPL CALCULATED.3IONS-SCNC: 3 MMOL/L (ref 3–14)
AST SERPL W P-5'-P-CCNC: 22 U/L (ref 0–45)
BILIRUB SERPL-MCNC: 0.5 MG/DL (ref 0.2–1.3)
BUN SERPL-MCNC: 20 MG/DL (ref 7–30)
CALCIUM SERPL-MCNC: 8.8 MG/DL (ref 8.5–10.1)
CHLORIDE SERPL-SCNC: 112 MMOL/L (ref 94–109)
CHOLEST SERPL-MCNC: 157 MG/DL
CO2 SERPL-SCNC: 31 MMOL/L (ref 20–32)
CREAT SERPL-MCNC: 1.01 MG/DL (ref 0.52–1.04)
CREAT UR-MCNC: 227 MG/DL
GFR SERPL CREATININE-BSD FRML MDRD: 53 ML/MIN/{1.73_M2}
GLUCOSE SERPL-MCNC: 108 MG/DL (ref 70–99)
HBA1C MFR BLD: 5.5 % (ref 0–5.6)
HDLC SERPL-MCNC: 63 MG/DL
LDLC SERPL CALC-MCNC: 71 MG/DL
MICROALBUMIN UR-MCNC: 32 MG/L
MICROALBUMIN/CREAT UR: 14.18 MG/G CR (ref 0–25)
NONHDLC SERPL-MCNC: 94 MG/DL
POTASSIUM SERPL-SCNC: 3.7 MMOL/L (ref 3.4–5.3)
PROT SERPL-MCNC: 6.9 G/DL (ref 6.8–8.8)
SODIUM SERPL-SCNC: 146 MMOL/L (ref 133–144)
TRIGL SERPL-MCNC: 116 MG/DL

## 2019-11-20 PROCEDURE — 82043 UR ALBUMIN QUANTITATIVE: CPT | Performed by: NURSE PRACTITIONER

## 2019-11-20 PROCEDURE — 99207 C FOOT EXAM  NO CHARGE: CPT | Performed by: NURSE PRACTITIONER

## 2019-11-20 PROCEDURE — 36415 COLL VENOUS BLD VENIPUNCTURE: CPT | Performed by: NURSE PRACTITIONER

## 2019-11-20 PROCEDURE — 80053 COMPREHEN METABOLIC PANEL: CPT | Performed by: NURSE PRACTITIONER

## 2019-11-20 PROCEDURE — 83036 HEMOGLOBIN GLYCOSYLATED A1C: CPT | Performed by: NURSE PRACTITIONER

## 2019-11-20 PROCEDURE — 80061 LIPID PANEL: CPT | Performed by: NURSE PRACTITIONER

## 2019-11-20 PROCEDURE — 99214 OFFICE O/P EST MOD 30 MIN: CPT | Performed by: NURSE PRACTITIONER

## 2019-11-20 RX ORDER — PENCICLOVIR 10 MG/G
CREAM TOPICAL
Qty: 1.5 G | Refills: 3 | Status: SHIPPED | OUTPATIENT
Start: 2019-11-20 | End: 2020-11-10

## 2019-11-20 NOTE — TELEPHONE ENCOUNTER
Tried to reach patient, left message for patient to call the clinic back.    Caden Roman, [facility]

## 2019-11-20 NOTE — PROGRESS NOTES
Subjective     Mya Hui is a 79 year old female who presents to clinic today for the following health issues:    HPI   Patient wanting to get orthotics for shoes, told she needed to be seen prior to getting them    She needs to have diabetes follow-up prior to getting those shoes.    She is currently diet controlled, hemoglobin A1c has consistently been 5.7 throughout the last year.  She is also on medication for management of hyperlipidemia and hypertension.  Tolerating her medications without side effect.  She follows her diet, stating she does not want to have to go back on metformin, since it caused chronic diarrhea.  She is had no further GI problems since discontinuing the metformin, and is done very well managing her diet.      Patient Active Problem List   Diagnosis     Advance Care Planning     Arthritis     Nevus     Asymptomatic carotid artery stenosis     Hyperlipidemia with target LDL less than 100     History of renal calculi     Sciatica of right side     Hip pain     Hearing aid worn     Osteoarthritis of hip     DDD (degenerative disc disease), lumbar     OAB (overactive bladder)     Essential hypertension with goal blood pressure less than 140/90     Hypothyroidism, unspecified type     Insomnia, unspecified     Type 2 diabetes mellitus without complication, without long-term current use of insulin (H)     Cataract, bilateral     Primary osteoarthritis involving multiple joints     Chronic right shoulder pain     Right shoulder pain, unspecified chronicity     Dry mouth     Rotator cuff arthropathy, right     Sliding hiatal hernia     Calculus of gallbladder without cholecystitis     Gastroesophageal reflux disease, esophagitis presence not specified     S/P laparoscopic cholecystectomy     Toe anomaly     Cherry angioma     Urinary incontinence, unspecified type     Bilateral lower extremity edema     Past Surgical History:   Procedure Laterality Date     BUNIONECTOMY      Right foot      CATARACT IOL, RT/LT Bilateral 2017     COLONOSCOPY  7/12/2013    Procedure: COLONOSCOPY;  Colonoscopy;  Surgeon: Giovany Espinoza MD;  [facility]     COLONOSCOPY N/A 1/31/2019    Procedure: Combined Colonoscopy, Single Or Multiple Biopsy/Polypectomy By Biopsy;  Surgeon: Efren Osorio MD;  [facility]     COLONOSCOPY WITH CO2 INSUFFLATION N/A 1/31/2019    Procedure: COLONOSCOPY WITH CO2 INSUFFLATION;  Surgeon: Efren Osorio MD;  [facility]     FRACTURE TX, ANKLE RT/LT      Left ankle     HC CYSTOURETHROSCOPY W/ URETEROSCOPY &/OR PYELOSCOPY; W/ LITHOTRIPSY       HC REVISE MEDIAN N/CARPAL TUNNEL SURG      Right wrist     INJECT EPIDURAL LUMBAR Right 12/27/2018    Procedure: INJECT EPIDURAL LUMBAR right foraminal narrowing severe at L4-L5 and moderate at L5-S1;  Surgeon: Gilbert Mcclure MD;  [facility]     INJECT JOINT SACROILIAC  4/10/2014    Procedure: INJECT JOINT SACROILIAC;  Sacroiliac Joint Injection;  Surgeon: Gilbert Mcclure MD;  [facility]     INJECT JOINT SACROILIAC Left 4/11/2019    Procedure: INJECT JOINT SACROILIAC- LEFT;  Surgeon: Gilbert Mcclure MD;  [facility]     LAPAROSCOPIC CHOLECYSTECTOMY N/A 3/24/2018    Procedure: LAPAROSCOPIC CHOLECYSTECTOMY;  Laparoscopic Cholecystectomy;  Surgeon: Berry Allen DO;  Location: [facility]       Social History     Tobacco Use     Smoking status: Never Smoker     Smokeless tobacco: Never Used   Substance Use Topics     Alcohol use: Yes     Family History   Problem Relation Age of Onset     Hypertension Brother      Cerebrovascular Disease Brother      Diabetes Father      Cardiovascular Father      Diabetes Brother         Borderline     Breast Cancer Mother      Diabetes Brother      Blood Disease Brother      Cardiovascular Brother         MI         Current Outpatient Medications   Medication Sig Dispense Refill     ACCU-CHEK COMPACT PLUS test strip USE TO TEST BLOOD BLOOD SUGARS ONCE DAILY OR  AS DIRECTED 102 each 3     acetaminophen (TYLENOL) 325 MG tablet Take 2 tablets (650 mg) by mouth every 4 hours as needed for mild pain 100 tablet      blood glucose (NO BRAND SPECIFIED) lancets standard Glucose test strips  Use to test blood sugar 1 times daily or as directed. 1 Box 11     blood glucose monitoring (SOFTCLIX) lancets USE TO TEST BLOOD SUGARS ONCE DAILY OR AS DIRECTED 100 each 11     CARTIA  MG 24 hr capsule TAKE ONE CAPSULE BY MOUTH TWICE DAILY 180 capsule 2     cholestyramine (QUESTRAN) 4 GM/DOSE powder Take 4 g by mouth 2 times daily (with meals) 378 g 11     hydrochlorothiazide (HYDRODIURIL) 12.5 MG tablet Take 1 tablet (12.5 mg) by mouth daily 30 tablet 3     levothyroxine (SYNTHROID/LEVOTHROID) 75 MCG tablet TAKE ONE TABLET BY MOUTH ONCE DAILY 60 tablet 8     lisinopril (PRINIVIL/ZESTRIL) 40 MG tablet TAKE ONE TABLET BY MOUTH ONCE DAILY 90 tablet 1     mirabegron (MYRBETRIQ) 50 MG 24 hr tablet Take 1 tablet (50 mg) by mouth daily 90 tablet 3     Multiple Vitamins-Minerals (MULTIVITAL PO) Take  by mouth. daily       order for DME Equipment being ordered: Nike shoes and insoles 2 Units 1     pantoprazole (PROTONIX) 40 MG EC tablet TAKE ONE TABLET BY MOUTH TWICE A DAY - TAKE 30 TO 60 MINUTES BEFORE A MEAL 180 tablet 3     penciclovir (DENAVIR) 1 % external cream Apply topically every 2 hours as needed for cold sores 1.5 g 3     potassium chloride ER (K-TAB/KLOR-CON) 10 MEQ CR tablet TAKE ONE TABLET BY MOUTH THREE TIMES A DAY 90 tablet 11     pravastatin (PRAVACHOL) 40 MG tablet TAKE ONE TABLET BY MOUTH ONCE DAILY 30 tablet 8     oxybutynin ER (DITROPAN-XL) 10 MG 24 hr tablet Take 1 tablet (10 mg) by mouth daily 90 tablet 3     BP Readings from Last 3 Encounters:   11/20/19 136/86   11/12/19 (!) 149/67   11/04/19 122/74    Wt Readings from Last 3 Encounters:   11/20/19 88.6 kg (195 lb 6.4 oz)   11/12/19 89.6 kg (197 lb 8 oz)   11/04/19 91.3 kg (201 lb 3.2 oz)                    Reviewed and  updated as needed this visit by Provider  Tobacco  Allergies  Meds  Problems  Med Hx  Surg Hx  Fam Hx         Review of Systems   ROS COMP: Constitutional, HEENT, cardiovascular, pulmonary, gi and gu systems are negative, except as otherwise noted.      Objective    /86   Pulse 71   Temp 97.1  F (36.2  C) (Temporal)   Resp 16   Wt 88.6 kg (195 lb 6.4 oz)   SpO2 96%   BMI 32.52 kg/m    Body mass index is 32.52 kg/m .  Physical Exam   GENERAL: healthy, alert and no distress  NECK: no adenopathy, no asymmetry, masses, or scars and thyroid normal to palpation  RESP: lungs clear to auscultation - no rales, rhonchi or wheezes  CV: regular rate and rhythm, normal S1 S2, no S3 or S4, no murmur, click or rub, no peripheral edema and peripheral pulses strong  ABDOMEN: soft, nontender, no hepatosplenomegaly, no masses and bowel sounds normal  MS: no gross musculoskeletal defects noted, no edema  NEURO: Normal strength and tone, mentation intact and speech normal  Diabetic foot exam: normal DP and PT pulses, no trophic changes or ulcerative lesions, normal sensory exam and she has some calluses on the toes of the right foot, does see podiatry    Diagnostic Test Results:  Labs reviewed in Epic  Results for orders placed or performed in visit on 11/20/19 (from the past 24 hour(s))   Lipid panel reflex to direct LDL Fasting   Result Value Ref Range    Cholesterol 157 <200 mg/dL    Triglycerides 116 <150 mg/dL    HDL Cholesterol 63 >49 mg/dL    LDL Cholesterol Calculated 71 <100 mg/dL    Non HDL Cholesterol 94 <130 mg/dL   Comprehensive metabolic panel   Result Value Ref Range    Sodium 146 (H) 133 - 144 mmol/L    Potassium 3.7 3.4 - 5.3 mmol/L    Chloride 112 (H) 94 - 109 mmol/L    Carbon Dioxide 31 20 - 32 mmol/L    Anion Gap 3 3 - 14 mmol/L    Glucose 108 (H) 70 - 99 mg/dL    Urea Nitrogen 20 7 - 30 mg/dL    Creatinine 1.01 0.52 - 1.04 mg/dL    GFR Estimate 53 (L) >60 mL/min/[1.73_m2]    GFR Estimate If Black  "62 >60 mL/min/[1.73_m2]    Calcium 8.8 8.5 - 10.1 mg/dL    Bilirubin Total 0.5 0.2 - 1.3 mg/dL    Albumin 3.7 3.4 - 5.0 g/dL    Protein Total 6.9 6.8 - 8.8 g/dL    Alkaline Phosphatase 65 40 - 150 U/L    ALT 47 0 - 50 U/L    AST 22 0 - 45 U/L   Hemoglobin A1c   Result Value Ref Range    Hemoglobin A1C 5.5 0 - 5.6 %   Albumin Random Urine Quantitative with Creat Ratio   Result Value Ref Range    Creatinine Urine 227 mg/dL    Albumin Urine mg/L 32 mg/L    Albumin Urine mg/g Cr 14.18 0 - 25 mg/g Cr           Assessment & Plan     1. Type 2 diabetes mellitus without complication, without long-term current use of insulin (H)  Well-controlled with diet  - Comprehensive metabolic panel  - Hemoglobin A1c  - Albumin Random Urine Quantitative with Creat Ratio  - FOOT EXAM    2. Hyperlipidemia with target LDL less than 100  Within goal, continue Questran and Pravachol  - Lipid panel reflex to direct LDL Fasting    3. Essential hypertension with goal blood pressure less than 140/90  Within goal.  Continue lisinopril, Cartia XT, and hydrochlorothiazide    4. H/O cold sores  Patient is requesting a refill of Denavir cream which she has had in the past for treatment of cold sores  - penciclovir (DENAVIR) 1 % external cream; Apply topically every 2 hours as needed for cold sores  Dispense: 1.5 g; Refill: 3     BMI:   Estimated body mass index is 32.52 kg/m  as calculated from the following:    Height as of 11/12/19: 1.651 m (5' 5\").    Weight as of this encounter: 88.6 kg (195 lb 6.4 oz).   Weight management plan: Discussed healthy diet and exercise guidelines            Return in about 6 months (around 5/20/2020) for diabetes follow up.    MAYI Anthony [facility]    "

## 2019-11-20 NOTE — LETTER
November 21, 2019      Mya Hui  [address]   [facility] [address]        Dear ,    We are writing to inform you of your test results.    Your labs look good.  No change in medication    Resulted Orders   Lipid panel reflex to direct LDL Fasting   Result Value Ref Range    Cholesterol 157 <200 mg/dL    Triglycerides 116 <150 mg/dL      Comment:      Fasting specimen    HDL Cholesterol 63 >49 mg/dL    LDL Cholesterol Calculated 71 <100 mg/dL      Comment:      Desirable:       <100 mg/dl    Non HDL Cholesterol 94 <130 mg/dL   Comprehensive metabolic panel   Result Value Ref Range    Sodium 146 (H) 133 - 144 mmol/L    Potassium 3.7 3.4 - 5.3 mmol/L    Chloride 112 (H) 94 - 109 mmol/L    Carbon Dioxide 31 20 - 32 mmol/L    Anion Gap 3 3 - 14 mmol/L    Glucose 108 (H) 70 - 99 mg/dL      Comment:      Fasting specimen    Urea Nitrogen 20 7 - 30 mg/dL    Creatinine 1.01 0.52 - 1.04 mg/dL    GFR Estimate 53 (L) >60 mL/min/[1.73_m2]      Comment:      Non  GFR Calc  Starting 12/18/2018, serum creatinine based estimated GFR (eGFR) will be   calculated using the Chronic Kidney Disease Epidemiology Collaboration   (CKD-EPI) equation.      GFR Estimate If Black 62 >60 mL/min/[1.73_m2]      Comment:       GFR Calc  Starting 12/18/2018, serum creatinine based estimated GFR (eGFR) will be   calculated using the Chronic Kidney Disease Epidemiology Collaboration   (CKD-EPI) equation.      Calcium 8.8 8.5 - 10.1 mg/dL    Bilirubin Total 0.5 0.2 - 1.3 mg/dL    Albumin 3.7 3.4 - 5.0 g/dL    Protein Total 6.9 6.8 - 8.8 g/dL    Alkaline Phosphatase 65 40 - 150 U/L    ALT 47 0 - 50 U/L    AST 22 0 - 45 U/L   Hemoglobin A1c   Result Value Ref Range    Hemoglobin A1C 5.5 0 - 5.6 %      Comment:      Normal <5.7% Prediabetes 5.7-6.4%  Diabetes 6.5% or higher - adopted from ADA   consensus guidelines.     Albumin Random Urine Quantitative with Creat Ratio   Result Value Ref Range     Creatinine Urine 227 mg/dL    Albumin Urine mg/L 32 mg/L    Albumin Urine mg/g Cr 14.18 0 - 25 mg/g Cr       If you have any questions or concerns, please call the clinic at the number listed above.       Sincerely,        MAYI Anthony CNP

## 2019-11-20 NOTE — TELEPHONE ENCOUNTER
----- Message from MAYI Anthony CNP sent at 11/20/2019  1:13 PM CST -----  Your labs look good.  No change in medication

## 2019-11-26 ENCOUNTER — TELEPHONE (OUTPATIENT)
Dept: FAMILY MEDICINE | Facility: CLINIC | Age: 79
End: 2019-11-26

## 2019-11-26 NOTE — TELEPHONE ENCOUNTER
Prior Authorization Approval    Authorization Effective Date: 8/28/2019  Authorization Expiration Date: 11/25/2020  Medication: Denavir 1% cream- APPROVED   Approved Dose/Quantity:   Reference #:     Insurance Company: [employer] - Phone [phone] Fax [phone]  Expected CoPay:       CoPay Card Available:      Foundation Assistance Needed:    Which Pharmacy is filling the prescription (Not needed for infusion/clinic administered[id number] [address]  Pharmacy Notified: Yes  Patient Notified: Comment:  **Instructed pharmacy to notify patient when script is ready to /ship.**

## 2019-11-26 NOTE — TELEPHONE ENCOUNTER
Central Prior Authorization Team  Phone: [phone]    PA Initiation    Medication: Denavir 1% cream  Insurance Company: [employer] - Phone [phone] Fax [phone]  Pharmacy Filling the Rx: [id number] - [place], [place] - [address] [place] [address]  Filling Pharmacy Phone: [phone]  Filling Pharmacy Fax:    Start Date: 11/26/2019

## 2019-12-02 ENCOUNTER — HOSPITAL ENCOUNTER (OUTPATIENT)
Dept: PHYSICAL THERAPY | Facility: CLINIC | Age: 79
Setting detail: THERAPIES SERIES
End: 2019-12-02
Attending: PHYSICAL MEDICINE & REHABILITATION
Payer: COMMERCIAL

## 2019-12-02 PROCEDURE — 97110 THERAPEUTIC EXERCISES: CPT | Mod: GP

## 2019-12-02 PROCEDURE — 97162 PT EVAL MOD COMPLEX 30 MIN: CPT | Mod: GP

## 2019-12-02 NOTE — PROGRESS NOTES
12/02/19 0900   General Information   Type of Visit Initial OP Ortho PT Evaluation   Start of Care Date 12/02/19   Referring Physician Nereyda Cohen MD   Patient/Family Goals Statement Decrease pain. Be able to perform ADLs without limitations.    Orders Evaluate and Treat   Date of Order 11/12/19   Certification Required? No   Medical Diagnosis Pain of left sacroiliac joint M53.3   Surgical/Medical history reviewed Yes   Precautions/Limitations no known precautions/limitations   Weight-Bearing Status - LUE full weight-bearing   Weight-Bearing Status - RUE full weight-bearing   Weight-Bearing Status - LLE full weight-bearing   Weight-Bearing Status - RLE full weight-bearing       Present No   Body Part(s)   Body Part(s) Lumbar Spine/SI   Presentation and Etiology   Pertinent history of current problem (include personal factors and/or comorbidities that impact the POC) Patient reports to evaluation with acute L SIJ pain. No recent falls at home, however does report up to 6 falls at home within past few years. Since SIJ injection on 11/4, pain ranges only from 0-1/10 in SIJ. R shoulder is causing more pain than SIJ. She reports compliance with use of 4WW for all mobility. She will occasionally use back brace if increased pain, specifically during ADLs that require bending and lifting. Patient is being seen in physical therapy for R shoulder pain with supraspinatus tear and possible biceps long head tear. See epic for detailed PMH.    Impairments A. Pain;D. Decreased ROM;E. Decreased flexibility;F. Decreased strength and endurance;G. Impaired balance;H. Impaired gait;L. Tingling;N. Headaches   Functional Limitations perform activities of daily living;perform desired leisure / sports activities   Symptom Location L SIJ   How/Where did it occur From insidious onset;From Degenerative Joint Disease   Onset date of current episode/exacerbation 11/25/19   Chronicity New   Pain rating (0-10 point  scale) Best (/10);Worst (/10)   Best (/10) 0   Worst (/10) 8   Pain quality A. Sharp;C. Aching;E. Shooting   Frequency of pain/symptoms B. Intermittent   Pain/symptoms are: The same all the time   Pain/symptoms exacerbated by B. Walking;C. Lifting;G. Certain positions;J. ADL;K. Home tasks   Pain/symptoms eased by A. Sitting;C. Rest;G. Heat;H. Cold;I. OTC medication(s);J. Braces/supports   Progression of symptoms since onset: Improved   Current / Previous Interventions   Diagnostic Tests: X-ray   X-ray Results Results   X-ray results Technically successful left sacroiliac joint steroid and local anesthetic injection. There is favorable initial pain relief. Long term outcome is pending.   Prior Level of Function   Prior Level of Function-Mobility use of 4WW for all mobility.    Prior Level of Function-ADLs IND   Functional Level Prior Comment has SPC, however has primarily been using 4WW   Current Level of Function   Current Community Support Family/friend caregiver  (lives alone)   Patient role/employment history F. Retired   Living environment Apartment/condo   Home/community accessibility uses elevator - stairs are difficult   Current equipment-Gait/Locomotion Front wheeled walker;Standard cane   Current equipment-ADL Shower/tub chair;Shower/tub grab bar   Fall Risk Screen   Fall screen completed by PT   Have you fallen 2 or more times in the past year? Yes   Have you fallen and had an injury in the past year? Yes   Timed Up and Go score (seconds) 26 sec   Is patient a fall risk? Yes;Department fall risk interventions implemented   System Outcome Measures   Outcome Measures Low Back Pain (see Oswestry and Shelli)   Lumbar Spine/SI Objective Findings   Observation patient alone at [facility], in no apparent distress   Integumentary no abnormalities noted, however does have small abrasion on R cheek (dermatology appt scheduled)   Posture forward head, rounded shoulders.    Gait/Locomotion slow dana, forward trunk, use of  4WW for stability   Balance/Proprioception (Single Leg Stance) unable to perform SLS   Flexion ROM 25% limited   Extension ROM 25% limited, no pain   Right Side Bending ROM WFL   Left Side Bending ROM WFL   Pelvic Screen positive L distraction    Hip Screen possible B impingement - will continue to assess   Hip Flexion (L2) Strength 4/5 MATEO   Hip Abduction Strength 4/5 MATEO   Hip Adduction Strength 4/5 MATEO   Knee Flexion Strength 4+/5 MATEO   Knee Extension (L3) Strength 4+/5 MATEO   Ankle Dorsiflexion (L4) Strength 4+/5 MATEO   Ankle Plantar Flexion (S1) Strength 4+/5 MATEO   Hamstring Flexibility B limitations noted   Hip Flexor Flexibility B limitations noted   Piriformis Flexibility B limitations noted   Segmental Mobility Hypomobility throughout thoracic spine   Palpation Mild tenderness L lumbar paraspinals, L SIJ   Planned Therapy Interventions   Planned Therapy Interventions balance training;bed mobility training;gait training;joint mobilization;manual therapy;neuromuscular re-education;ROM;strengthening;stretching;transfer training   Planned Therapy Interventions Comment other interventions as indicated   Planned Modality Interventions   Planned Modality Interventions Cryotherapy   Planned Modality Interventions Comments other modalities as indicated   Clinical Impression   Criteria for Skilled Therapeutic Interventions Met yes, treatment indicated   PT Diagnosis mechanical low back pain and L SI joint pain; Decreased B LE strength & flexibility   Influenced by the following impairments pain, ROM, strength, balance, flexibility   Functional limitations due to impairments prolonged ambulation, stairs, prolonged sitting and standing, ADLs   Clinical Presentation Evolving/Changing   Clinical Presentation Rationale Patient is a 79 y.o. female presenting with acute Left SI joint pain. At time of examination, patient had minimum pain however deficits in B LE strength, flexibility, and balance were noted. Patient is  limited with home tasks and ADLs. Patient would benefit from skilled physical therapy intervention to address aforementioned deficits and limitations.    Clinical Decision Making (Complexity) Low complexity   Therapy Frequency 2 times/Week   Predicted Duration of Therapy Intervention (days/wks) 1-2 x per week for 8 weeks   Risk & Benefits of therapy have been explained Yes   Patient, Family & other staff in agreement with plan of care Yes   Education Assessment   Preferred Learning Style Listening;Demonstration   Barriers to Learning No barriers   ORTHO GOALS   PT Ortho Eval Goals 1;2;3   Ortho Goal 1   Goal Identifier Ambulation    Goal Description Patient will report ability to walk for 30 minutes at a time using 4WW or grocery cart without increase in LBP or Left SIJ pain above 2/10 in order to demonstrate improved tolerance to prolonged ambulation.    Target Date 12/30/19   Ortho Goal 2   Goal Identifier MAURICIO/Shelli   Goal Description Patient will improve MAURICIO score by at least 10% and Shelli to Low Risk in order to demonstrate functional improvements.   Target Date 01/27/20   Ortho Goal 3   Goal Identifier Functional strength   Goal Description Patient will perform 5 repetitions of sit<>stand without use of UE support in 20 seconds or less to demonstrate improvement in functional strength and balance.   Target Date 01/27/20   Total Evaluation Time   PT Millie, Moderate Complexity Minutes ([id number]) 25       Thank you for your referral.     Maya Mahajan PT, DPT    [facility]    O: [phone]  E: [email or web address]

## 2019-12-20 ENCOUNTER — HOSPITAL ENCOUNTER (OUTPATIENT)
Dept: PHYSICAL THERAPY | Facility: CLINIC | Age: 79
Setting detail: THERAPIES SERIES
End: 2019-12-20
Attending: PHYSICAL MEDICINE & REHABILITATION
Payer: COMMERCIAL

## 2019-12-20 PROCEDURE — 97110 THERAPEUTIC EXERCISES: CPT | Mod: GP | Performed by: PHYSICAL THERAPIST

## 2019-12-23 ENCOUNTER — OFFICE VISIT (OUTPATIENT)
Dept: DERMATOLOGY | Facility: CLINIC | Age: 79
End: 2019-12-23
Payer: COMMERCIAL

## 2019-12-23 VITALS
BODY MASS INDEX: 32.52 KG/M2 | HEIGHT: 65 IN | HEART RATE: 68 BPM | DIASTOLIC BLOOD PRESSURE: 87 MMHG | SYSTOLIC BLOOD PRESSURE: 150 MMHG

## 2019-12-23 DIAGNOSIS — D18.01 ANGIOMA OF SKIN: ICD-10-CM

## 2019-12-23 DIAGNOSIS — D48.5 NEOPLASM OF UNCERTAIN BEHAVIOR OF SKIN: Primary | ICD-10-CM

## 2019-12-23 DIAGNOSIS — L81.4 LENTIGO: ICD-10-CM

## 2019-12-23 DIAGNOSIS — L82.1 SEBORRHEIC KERATOSIS: ICD-10-CM

## 2019-12-23 DIAGNOSIS — D22.9 NEVUS: ICD-10-CM

## 2019-12-23 PROCEDURE — 99214 OFFICE O/P EST MOD 30 MIN: CPT | Mod: 25 | Performed by: PHYSICIAN ASSISTANT

## 2019-12-23 PROCEDURE — 11102 TANGNTL BX SKIN SINGLE LES: CPT | Performed by: PHYSICIAN ASSISTANT

## 2019-12-23 PROCEDURE — 11103 TANGNTL BX SKIN EA SEP/ADDL: CPT | Performed by: PHYSICIAN ASSISTANT

## 2019-12-23 PROCEDURE — 88305 TISSUE EXAM BY PATHOLOGIST: CPT | Mod: TC | Performed by: PHYSICIAN ASSISTANT

## 2019-12-23 NOTE — PROGRESS NOTES
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  See patient intake sheet.  ROS of the following were done and are negative: Constitutional, Eyes, Ears, Nose,   Mouth, Throat, Cardiovascular, Respiratory, GI, Genitourinary, Musculoskeletal,   Psychiatric, Endocrine, Allergic/Immunologic.    PHYSICAL EXAM:   BP (!) 150/87   Pulse 68   Ht 1.651 m (5' 5\")   BMI 32.52 kg/m    Skin exam performed as follows: Type 2 skin. Mood appropriate  Alert and Oriented X 3. Well developed, well nourished in no distress.  General appearance: Normal  Head including face: Normal  Eyes: conjunctiva and lids: Normal  Mouth: Lips, teeth, gums: Normal  Neck: Normal  Chest-breast/axillae: Normal  Back: Normal  Spleen and liver: Normal  Cardiovascular: Exam of peripheral vascular system by observation for swelling, varicosities, edema: Normal  Genitalia: groin, buttocks: Normal  Extremities: digits/nails (clubbing): Normal  Eccrine and Apocrine glands: Normal  Right upper extremity: Normal  Left upper extremity: Normal  Right lower extremity: Normal  Left lower extremity: Normal  Skin: Scalp and body hair: See below    Pt deferred exam of breasts, groin, buttocks: No    Other physical findings:  1. Multiple pigmented macules on extremities and trunk  2. Multiple pigmented macules on face, trunk and extremities  3. Multiple vascular papules on trunk, arms and legs  4. Multiple scattered " keratotic plaques  5. 6 mm ulcerated papule on the right mid cheek  6. 15 mm pink fleshy papule on the left medial breast        Except as noted above, no other signs of skin cancer or melanoma.     ASSESSMENT/PLAN:   Benign Full skin cancer screening today. . Patient with history of none  Advised on monthly self exams and 1 year  Patient Education: Appropriate brochures given.    1. Multiple benign appearing nevi on arms, legs and trunk. Discussed ABCDEs of melanoma and sunscreen.   2. Multiple lentigos on arms, legs and trunk. Advised benign, no treatment needed.  3. Multiple scattered angiomas. Advised benign, no treatment needed.   4. Seborrheic keratosis on arms, legs and trunk. Advised benign, no treatment needed.  5. R/o BCC on the right mid cheek. Shave biopsy performed.  Area cleaned and anesthetized with 1% lidocaine with epinephrine.  Dermablade used to remove the lesion and sent to pathology. Bleeding was cauterized. Pt tolerated procedure well with no complications.   6. Compound nevus r/o atypicality on the left medial breast. Shave biopsy performed.  Area cleaned and anesthetized with 1% lidocaine with epinephrine.  Dermablade used to remove the lesion and sent to pathology. Bleeding was cauterized. Pt tolerated procedure well with no complications.   7. Mya to follow up with Primary Care provider regarding elevated blood pressure.            Follow-up: yearly FSE/PRN sooner    1.) Patient was asked about new and changing moles. YES  2.) Patient received a complete physical skin examination: YES  3.) Patient was counseled to perform a monthly self skin examination: YES  Scribed By: Divya Harrison, MS, PAGELA

## 2019-12-23 NOTE — PATIENT INSTRUCTIONS
Wound Care Instructions     FOR SUPERFICIAL WOUNDS     [facility] [phone]    [facility] [phone]                       AFTER 24 HOURS YOU SHOULD REMOVE THE BANDAGE AND BEGIN DAILY DRESSING CHANGES AS FOLLOWS:     1) Remove Dressing.     2) Clean and dry the area with tap water using a Q-tip or sterile gauze pad.     3) Apply Vaseline, Aquaphor, Polysporin ointment or Bacitracin ointment over entire wound.  Do NOT use Neosporin ointment.     4) Cover the wound with a band-aid, or a sterile non-stick gauze pad and micropore paper tape      REPEAT THESE INSTRUCTIONS AT LEAST ONCE A DAY UNTIL THE WOUND HAS COMPLETELY HEALED.    It is an old wives tale that a wound heals better when it is exposed to air and allowed to dry out. The wound will heal faster with a better cosmetic result if it is kept moist with ointment and covered with a bandage.    **Do not let the wound dry out.**      Supplies Needed:      *Cotton tipped applicators (Q-tips)    *Polysporin Ointment or Bacitracin Ointment (NOT NEOSPORIN)    *Band-aids or non-stick gauze pads and micropore paper tape.      PATIENT INFORMATION:    During the healing process you will notice a number of changes. All wounds develop a small halo of redness surrounding the wound.  This means healing is occurring. Severe itching with extensive redness usually indicates sensitivity to the ointment or bandage tape used to dress the wound.  You should call our office if this develops.      Swelling  and/or discoloration around your surgical site is common, particularly when performed around the eye.    All wounds normally drain.  The larger the wound the more drainage there will be.  After 7-10 days, you will notice the wound beginning to shrink and new skin will begin to grow.  The wound is healed when you can see skin has formed over the entire area.  A healed wound has a healthy, shiny look to the surface and is red to dark pink in color  to normalize.  Wounds may take approximately 4-6 weeks to heal.  Larger wounds may take 6-8 weeks.  After the wound is healed you may discontinue dressing changes.    You may experience a sensation of tightness as your wound heals. This is normal and will gradually subside.    Your healed wound may be sensitive to temperature changes. This sensitivity improves with time, but if you re having a lot of discomfort, try to avoid temperature extremes.    Patients frequently experience itching after their wound appears to have healed because of the continue healing under the skin.  Plain Vaseline will help relieve the itching.        POSSIBLE COMPLICATIONS    BLEEDIN. Leave the bandage in place.  2. Use tightly rolled up gauze or a cloth to apply direct pressure over the bandage for 30  minutes.  3. Reapply pressure for an additional 30 minutes if necessary  4. Use additional gauze and tape to maintain pressure once the bleeding has stopped.

## 2019-12-23 NOTE — LETTER
"    12/23/2019         RE: Mya Hui  [address]  [facility] [address]        Dear Colleague,    Thank you for referring your patient, Mya Hui, to the [facility]. Please see a copy of my visit note below.    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  See patient intake sheet.  ROS of the following were done and are negative: Constitutional, Eyes, Ears, Nose,   Mouth, Throat, Cardiovascular, Respiratory, GI, Genitourinary, Musculoskeletal,   Psychiatric, Endocrine, Allergic/Immunologic.    PHYSICAL EXAM:   BP (!) 150/87   Pulse 68   Ht 1.651 m (5' 5\")   BMI 32.52 kg/m     Skin exam performed as follows: Type 2 skin. Mood appropriate  Alert and Oriented X 3. Well developed, well nourished in no distress.  General appearance: Normal  Head including face: Normal  Eyes: conjunctiva and lids: Normal  Mouth: Lips, teeth, gums: Normal  Neck: Normal  Chest-breast/axillae: Normal  Back: Normal  Spleen and liver: Normal  Cardiovascular: Exam of peripheral vascular system by observation for swelling, varicosities, edema: Normal  Genitalia: groin, buttocks: Normal  Extremities: digits/nails (clubbing): Normal  Eccrine and Apocrine glands: Normal  Right upper extremity: Normal  Left upper extremity: Normal  Right lower extremity: Normal  Left lower extremity: Normal  Skin: Scalp and body hair: See below    Pt deferred exam of " breasts, groin, buttocks: No    Other physical findings:  1. Multiple pigmented macules on extremities and trunk  2. Multiple pigmented macules on face, trunk and extremities  3. Multiple vascular papules on trunk, arms and legs  4. Multiple scattered keratotic plaques  5. 6 mm ulcerated papule on the right mid cheek  6. 15 mm pink fleshy papule on the left medial breast        Except as noted above, no other signs of skin cancer or melanoma.     ASSESSMENT/PLAN:   Benign Full skin cancer screening today. . Patient with history of none  Advised on monthly self exams and 1 year  Patient Education: Appropriate brochures given.    1. Multiple benign appearing nevi on arms, legs and trunk. Discussed ABCDEs of melanoma and sunscreen.   2. Multiple lentigos on arms, legs and trunk. Advised benign, no treatment needed.  3. Multiple scattered angiomas. Advised benign, no treatment needed.   4. Seborrheic keratosis on arms, legs and trunk. Advised benign, no treatment needed.  5. R/o BCC on the right mid cheek. Shave biopsy performed.  Area cleaned and anesthetized with 1% lidocaine with epinephrine.  Dermablade used to remove the lesion and sent to pathology. Bleeding was cauterized. Pt tolerated procedure well with no complications.   6. Compound nevus r/o atypicality on the left medial breast. Shave biopsy performed.  Area cleaned and anesthetized with 1% lidocaine with epinephrine.  Dermablade used to remove the lesion and sent to pathology. Bleeding was cauterized. Pt tolerated procedure well with no complications.   7. Mya to follow up with Primary Care provider regarding elevated blood pressure.            Follow-up: yearly FSE/PRN sooner    1.) Patient was asked about new and changing moles. YES  2.) Patient received a complete physical skin examination: YES  3.) Patient was counseled to perform a monthly self skin examination: YES  Scribed By: Divya Harrison, MS, PA-C        Again, thank you for allowing me to  participate in the care of your patient.        Sincerely,        Divya Pollock PA-C

## 2019-12-23 NOTE — NURSING NOTE
"Initial BP (!) 150/87   Pulse 68   Ht 1.651 m (5' 5\")   BMI 32.52 kg/m   Estimated body mass index is 32.52 kg/m  as calculated from the following:    Height as of this encounter: 1.651 m (5' 5\").    Weight as of 11/20/19: 88.6 kg (195 lb 6.4 oz). .      "

## 2019-12-27 LAB — COPATH REPORT: NORMAL

## 2020-01-15 DIAGNOSIS — E87.6 HYPOKALEMIA: ICD-10-CM

## 2020-01-15 DIAGNOSIS — R19.7 DIARRHEA, UNSPECIFIED TYPE: ICD-10-CM

## 2020-01-15 NOTE — TELEPHONE ENCOUNTER
1130 ot  Returned from IR post right thoracentesis, for 1000 cc fluid, tolerated well   Seen and discharged by [facility] Reason for Call:  Medication or medication refill:    Do you use a [facility]?  Name of the pharmacy and phone number for the current request:  [id number] - [address]    Name of the medication requested:   1. potassium chloride 10 MEQ CR tablet   2. cholestyramine 4 GM/DOSE powder   3. levothyroxine 75 MCG tablet   4. CARTIA  MG   5. pantoprazole 40 MG EC tablet   6. lisinopril 40 MG tablet   7. mirabegron 50 MG 24 hr tablet   8. oxybutynin 10 MG 24 hr tablet   9. pravastatin 40 MG tablet     Other request: Patient's primary is [facility] and is needing her to be the new prescribing provider for her medications, she is needing refills to be sent to [facility] in [place]. Please call patient to let her know if she needs an appointment before refills.     Can we leave a detailed message on this number? YES    Phone number patient can be reached at: Home number on file [phone] (home)    Best Time: any    Call taken on 1/15/2020 at 4:54 PM by Katlin Sellers

## 2020-01-16 RX ORDER — POTASSIUM CHLORIDE 750 MG/1
10 TABLET, EXTENDED RELEASE ORAL 3 TIMES DAILY
Qty: 90 TABLET | Refills: 11 | Status: SHIPPED | OUTPATIENT
Start: 2020-01-16 | End: 2020-01-20

## 2020-01-16 RX ORDER — CHOLESTYRAMINE 4 G/9G
4 POWDER, FOR SUSPENSION ORAL 2 TIMES DAILY WITH MEALS
Qty: 378 G | Refills: 11 | Status: SHIPPED | OUTPATIENT
Start: 2020-01-16 | End: 2020-05-13

## 2020-01-16 NOTE — PROGRESS NOTES
Subjective     Mya Hui is a 79 year old female who presents to clinic today for the following health issues:    HPI   Diabetes Follow-up    How often are you checking your blood sugar? A few times a week           Accu-chek            110-112   What time of day are you checking your blood sugars (select all that apply)?  checks 2-3 times a week at random during the day  Have you had any blood sugars above 200?  No  Have you had any blood sugars below 70?  No    What symptoms do you notice when your blood sugar is low?  Shaky    What concerns do you have today about your diabetes? None     Do you have any of these symptoms? (Select all that apply)  No numbness or tingling in feet.  No redness, sores or blisters on feet.  No complaints of excessive thirst.  No reports of blurry vision.  No significant changes to weight.    Hyperlipidemia Follow-Up      Are you regularly taking any medication or supplement to lower your cholesterol?   Yes- Pravastatin, takes daily    Are you having muscle aches or other side effects that you think could be caused by your cholesterol lowering medication?  No    Hypertension Follow-up      Do you check your blood pressure regularly outside of the clinic? Yes     Are you following a low salt diet? Yes    Are your blood pressures ever more than 140 on the top number (systolic) OR more   than 90 on the bottom number (diastolic), for example 140/90? Yes    153/82 - Sunday AM spiked   - Keeps spiking, seems to be at night and in the morning   - Makes head hurts   - Potassium 2-3x/day, often forgets      Snacks at night      Once a week or less this happens     - Cholestyramine      Takes twice a day       Not always formed ([place] type 5)       Usually 3 & 4     - Watching salt         BP Readings from Last 2 Encounters:   01/20/20 134/82   12/23/19 (!) 150/87     Hemoglobin A1C (%)   Date Value   11/20/2019 5.5   09/24/2019 5.7 (H)     LDL Cholesterol Calculated (mg/dL)   Date  Value   11/20/2019 71   03/08/2019 38         How many servings of fruits and vegetables do you eat daily?  0-1    On average, how many sweetened beverages do you drink each day (Examples: soda, juice, sweet tea, etc.  Do NOT count diet or artificially sweetened beverages)?   0    How many days per week do you exercise enough to make your heart beat faster? 3 or less    How many minutes a day do you exercise enough to make your heart beat faster? 9 or less    How many days per week do you miss taking your medication? 0      Recent Labs   Lab Test 11/20/19  1108 09/24/19  1645 03/08/19  0927 11/27/18  0935 10/10/18  0752   A1C 5.5 5.7* 5.7*  --  5.7*   LDL 71  --  38  --  60   HDL 63  --  46*  --  40*   TRIG 116  --  165*  --  213*   ALT 47  --  27 26 25   CR 1.01  --  1.07*  --  1.11*   GFRESTIMATED 53*  --  50*  --  48*   GFRESTBLACK 62  --  58*  --  58*   POTASSIUM 3.7  --  4.2  --  3.7   TSH  --  2.14 4.77*  --  3.84      BP Readings from Last 3 Encounters:   01/20/20 134/82   12/23/19 (!) 150/87   11/20/19 136/86    Wt Readings from Last 3 Encounters:   01/20/20 89.4 kg (197 lb)   11/20/19 88.6 kg (195 lb 6.4 oz)   11/12/19 89.6 kg (197 lb 8 oz)           Reviewed and updated as needed this visit by Provider  Allergies  Meds  Problems  Med Hx  Surg Hx         Review of Systems   ROS COMP: Constitutional, HEENT, cardiovascular, pulmonary, gi and gu systems are negative, except as otherwise noted.      Objective    /82   Pulse 65   Temp 98.5  F (36.9  C) (Temporal)   Resp 16   Wt 89.4 kg (197 lb)   SpO2 95%   BMI 32.78 kg/m    Body mass index is 32.78 kg/m .  Physical Exam   GENERAL APPEARANCE: healthy, alert and no distress  EYES: Eyes grossly normal to inspection, PERRLA, conjunctivae and sclerae without injection or discharge, EOM intact   RESP: Lungs clear to auscultation - no rales, rhonchi or wheezes    CV: Regular rates and rhythm, normal S1 S2, no S3 or S4, no murmur, click or rub, no  peripheral edema and peripheral pulses strong and symmetric bilaterally   MS: No musculoskeletal defects are noted and gait is age appropriate without ataxia   SKIN: No suspicious lesions or rashes, hydration status appears adeuqate with normal skin turgor   PSYCH: Alert and oriented x3; speech- coherent , normal rate and volume; able to articulate logical thoughts, able to abstract reason, no tangential thoughts, no hallucinations or delusions, mentation appears normal, Mood is euthymic. Affect is appropriate for this mood state and bright. Thought content is free of suicidal ideation, hallucinations, and delusions. Dress is adequate and upkept. Eye contact is good during conversation.       Diagnostic Test Results:  Labs reviewed in Epic  none         Assessment & Plan       ICD-10-CM    1. Type 2 diabetes mellitus without complication, without long-term current use of insulin (H) E11.9    2. Diabetes mellitus with peripheral vascular disease (H) E11.51    3. Hypothyroidism, unspecified type E03.9 levothyroxine (SYNTHROID/LEVOTHROID) 75 MCG tablet   4. Hyperlipidemia with target LDL less than 100 E78.5 pravastatin (PRAVACHOL) 40 MG tablet   5. Essential hypertension with goal blood pressure less than 140/90 I10 diltiazem ER COATED BEADS (CARTIA XT) 120 MG 24 hr capsule     lisinopril (PRINIVIL/ZESTRIL) 40 MG tablet   6. OAB (overactive bladder) N32.81 Incontinence Supply Disposable (PROTECTIVE UNDERWEAR SUPER LG) MISC   7. Gastroesophageal reflux disease, esophagitis presence not specified K21.9 pantoprazole (PROTONIX) 40 MG EC tablet   8. Hypokalemia E87.6 potassium chloride ER (K-TAB/KLOR-CON) 10 MEQ CR tablet   9. CKD (chronic kidney disease) stage 3, GFR 30-59 ml/min (H) N18.3      - Patient here to establish care   - Long review of medications and refilled as needed      Reviewed all use and side effects   - Patient reports a lot of arthritis      Needs to be more active, discussed walking the halls, doing  hobbies like sewing      Also discussed daily stretching, hand outs given   - Diabetes      Checked 11/2019, A1C 5.5 diet controlled   - No one had told her about her CKD      Years worth of mildly elevated Cr and mildly decreased GFR       Discussed no NSAIDs (only takes Tylenol) and BP control   - Patient is reporting mildly elevated BPs 0-1x/week      After much discussion, does seem to be after she has salt containing snack before bed      Recommend monitor this and find healthy no salt alternative     The patient indicates understanding of these issues and agrees with the plan.    Return in about 3 months (around 4/20/2020).for diabetes recheck and labs     Kisha Stinson PA-C  [facility]

## 2020-01-17 RX ORDER — POTASSIUM CHLORIDE 750 MG/1
TABLET, EXTENDED RELEASE ORAL
Qty: 90 TABLET | Refills: 11 | Status: SHIPPED | OUTPATIENT
Start: 2020-01-17 | End: 2020-01-20

## 2020-01-17 NOTE — TELEPHONE ENCOUNTER
"Requested Prescriptions   Pending Prescriptions Disp Refills     potassium chloride ER (K-TAB/KLOR-CON) 10 MEQ CR tablet [Pharmacy Med Name: POTASSIUM CHLORIDE ER 10MEQ TBCR] 90 tablet 11     Sig: TAKE ONE TABLET BY MOUTH THREE TIMES A DAY   Last Written Prescription Date:  1/16/2020  Last Fill Quantity: 90,  # refills: 11   Last office visit: 11/20/2019 with prescribing provider   Future Office Visit:   Next 5 appointments (look out 90 days)    Jan 20, 2020  1:30 PM CST  Office Visit with Kisha Stinson PA-C  [facility] [address] [address]  [phone]             Potassium Supplements Protocol Passed - 1/15/2020  2:55 PM        Passed - Recent (12 mo) or future (30 days) visit within the authorizing provider's department     Patient has had an office visit with the authorizing provider or a provider within the authorizing providers department within the previous 12 mos or has a future within next 30 days. See \"Patient Info\" tab in inbasket, or \"Choose Columns\" in Meds & Orders section of the refill encounter.              Passed - Medication is active on med list        Passed - Patient is age 18 or older        Passed - Normal serum potassium in past 12 months     Recent Labs   Lab Test 11/20/19  1108   POTASSIUM 3.7                    Prescription approved per [facility].  Alma Logan RN on 1/17/2020 at 11:52 AM    "

## 2020-01-20 ENCOUNTER — OFFICE VISIT (OUTPATIENT)
Dept: FAMILY MEDICINE | Facility: OTHER | Age: 80
End: 2020-01-20
Payer: COMMERCIAL

## 2020-01-20 VITALS
SYSTOLIC BLOOD PRESSURE: 138 MMHG | BODY MASS INDEX: 32.78 KG/M2 | DIASTOLIC BLOOD PRESSURE: 74 MMHG | WEIGHT: 197 LBS | RESPIRATION RATE: 16 BRPM | OXYGEN SATURATION: 95 % | TEMPERATURE: 98.5 F | HEART RATE: 65 BPM

## 2020-01-20 DIAGNOSIS — E78.5 HYPERLIPIDEMIA WITH TARGET LDL LESS THAN 100: ICD-10-CM

## 2020-01-20 DIAGNOSIS — E03.9 HYPOTHYROIDISM, UNSPECIFIED TYPE: ICD-10-CM

## 2020-01-20 DIAGNOSIS — N32.81 OAB (OVERACTIVE BLADDER): ICD-10-CM

## 2020-01-20 DIAGNOSIS — N18.30 CKD (CHRONIC KIDNEY DISEASE) STAGE 3, GFR 30-59 ML/MIN (H): ICD-10-CM

## 2020-01-20 DIAGNOSIS — E87.6 HYPOKALEMIA: ICD-10-CM

## 2020-01-20 DIAGNOSIS — E11.51 DIABETES MELLITUS WITH PERIPHERAL VASCULAR DISEASE (H): ICD-10-CM

## 2020-01-20 DIAGNOSIS — K21.9 GASTROESOPHAGEAL REFLUX DISEASE, ESOPHAGITIS PRESENCE NOT SPECIFIED: ICD-10-CM

## 2020-01-20 DIAGNOSIS — E11.9 TYPE 2 DIABETES MELLITUS WITHOUT COMPLICATION, WITHOUT LONG-TERM CURRENT USE OF INSULIN (H): Primary | ICD-10-CM

## 2020-01-20 DIAGNOSIS — I10 ESSENTIAL HYPERTENSION WITH GOAL BLOOD PRESSURE LESS THAN 140/90: ICD-10-CM

## 2020-01-20 PROCEDURE — 99214 OFFICE O/P EST MOD 30 MIN: CPT | Performed by: PHYSICIAN ASSISTANT

## 2020-01-20 RX ORDER — LISINOPRIL 40 MG/1
40 TABLET ORAL DAILY
Qty: 90 TABLET | Refills: 3 | Status: SHIPPED | OUTPATIENT
Start: 2020-01-20 | End: 2021-03-24

## 2020-01-20 RX ORDER — PRAVASTATIN SODIUM 40 MG
40 TABLET ORAL DAILY
Qty: 90 TABLET | Refills: 3 | Status: SHIPPED | OUTPATIENT
Start: 2020-01-20 | End: 2021-01-29

## 2020-01-20 RX ORDER — LEVOTHYROXINE SODIUM 75 UG/1
75 TABLET ORAL DAILY
Qty: 90 TABLET | Refills: 3 | Status: SHIPPED | OUTPATIENT
Start: 2020-01-20 | End: 2021-01-29

## 2020-01-20 RX ORDER — SENNOSIDES 8.6 MG
2 CAPSULE ORAL DAILY
Qty: 64 EACH | Refills: 11 | Status: SHIPPED | OUTPATIENT
Start: 2020-01-20 | End: 2021-08-31

## 2020-01-20 RX ORDER — PANTOPRAZOLE SODIUM 40 MG/1
40 TABLET, DELAYED RELEASE ORAL 2 TIMES DAILY
Qty: 180 TABLET | Refills: 3 | Status: SHIPPED | OUTPATIENT
Start: 2020-01-20 | End: 2021-02-19

## 2020-01-20 RX ORDER — POTASSIUM CHLORIDE 750 MG/1
10 TABLET, EXTENDED RELEASE ORAL 3 TIMES DAILY
Qty: 270 TABLET | Refills: 3 | Status: SHIPPED | OUTPATIENT
Start: 2020-01-20 | End: 2020-04-06

## 2020-01-20 RX ORDER — DILTIAZEM HYDROCHLORIDE 120 MG/1
120 CAPSULE, COATED, EXTENDED RELEASE ORAL 2 TIMES DAILY
Qty: 180 CAPSULE | Refills: 3 | Status: SHIPPED | OUTPATIENT
Start: 2020-01-20 | End: 2021-03-24

## 2020-01-20 ASSESSMENT — PAIN SCALES - GENERAL: PAINLEVEL: EXTREME PAIN (8)

## 2020-01-20 NOTE — PATIENT INSTRUCTIONS
- Potassium decrease to 2/day     - Monitor salt and blood pressure     - Recheck 3 months with labs       Patient Education     Back Exercises: Back Extension with Elbow Press    To start, lie face down on your stomach, feet slightly apart, forehead on the floor. Breathe deeply. You should feel comfortable and relaxed in this position.    Press up on your forearms. Keep your stomach and hips on the floor. Stay within your painfree range.    Hold for 20 seconds. Lower slowly.    Repeat 2 times.    Return to starting position.  Date Last Reviewed: 3/1/2018    1769-2562 [employer]. [address] [place] [address]. All rights reserved. This information is not intended as a substitute for professional medical care. Always follow your healthcare professional's instructions.           Patient Education     Back Exercises: Knee Lift         To start, lie on your back with your knees bent and feet flat on the floor. Don t press your neck or lower back to the floor. Breathe deeply. You should feel comfortable and relaxed in this position:    Start by tightening your abdominal muscles.    Lift one bent knee off the floor 2 to 4 inches.    Hold for 10 seconds. Return to start position.    Repeat 3 times.    Switch legs.  Date Last Reviewed: 3/1/2018    1127-9662 [employer]. [address] [place] [address]. All rights reserved. This information is not intended as a substitute for professional medical care. Always follow your healthcare professional's instructions.           Patient Education     Back Exercises: Lower Back Rotation    To start, lie on your back with your knees bent and feet flat on the floor. Don t press your neck or lower back to the floor. Breathe deeply. You should feel comfortable and relaxed in this position.    Drop both knees to one side. Turn your head to the other side. Keep your shoulders flat on the floor.    Do not push through pain.    Hold  for 20 seconds.    Slowly switch sides.    Repeat 2 to 5 times.  Date Last Reviewed: 3/1/2018    5007-9374 [employer]. [address] [place] [address]. All rights reserved. This information is not intended as a substitute for professional medical care. Always follow your healthcare professional's instructions.           Patient Education     Back Exercises: Lower Back Stretch    To start, sit in a chair with your feet flat on the floor. Shift your weight slightly forward. Relax, and keep your ears, shoulders, and hips aligned while you do the following:    Sit with your feet well apart.    Bend forward and touch the floor with the backs of your hands. Relax and let your body drop.    Hold for 20 seconds. Return to starting position.    Repeat 2 times.   Date Last Reviewed: 11/1/2017 2000-2019 [employer]. [address] [place] [address]. All rights reserved. This information is not intended as a substitute for professional medical care. Always follow your healthcare professional's instructions.           Patient Education     Back Exercises: Seated Rotation    To start, sit in a chair with your feet flat on the floor. Shift your weight slightly forward to avoid rounding your back. Relax, and keep your ears, shoulders, and hips aligned:    Fold your arms and elbows just below shoulder height.    Turn from the waist with hips forward. Turn your head last. Do not push through the pain.    Hold for a count of 10 to 30. Return to starting position.    Repeat 3 to 5 times on one side. Then switch sides.  Date Last Reviewed: 3/1/2018    4738-2178 [employer]. [address] [place] [address]. All rights reserved. This information is not intended as a substitute for professional medical care. Always follow your healthcare professional's instructions.           Patient Education     Back Exercises: Side Stretch    To start, sit in a chair with your  feet flat on the floor. Shift your weight slightly forward to avoid rounding your back. Relax. Keep your ears, shoulders, and hips aligned:    Stretch your right arm overhead.    Slowly bend to the left. Don t twist your torso. Stay within your pain limits.    Hold for 20 seconds. Return to starting position.    Repeat 2 to 5 times. Then, switch to the other side.  Date Last Reviewed: 3/1/2018    0339-7826 [employer]. [address] [place] [address]. All rights reserved. This information is not intended as a substitute for professional medical care. Always follow your healthcare professional's instructions.           Patient Education     Back Exercises: Pelvic Tilt    To start, lie on your back with your knees bent and feet flat on the floor. Don t press your neck or lower back to the floor. Breathe deeply. You should feel comfortable and relaxed in this position:    Tighten your stomach and buttocks, and press your lower back toward the floor. This should be a small, subtle movement. This should not increase your pain.    Hold for 5 to 15 seconds. Release.    Repeat 2 to 5 times.  Date Last Reviewed: 3/1/2018    1115-4134 [employer]. [address] [place] [address]. All rights reserved. This information is not intended as a substitute for professional medical care. Always follow your healthcare professional's instructions.           Patient Education     Shoulder and Upper Back Stretch  To start, stand tall with your ears, shoulders, and hips in line. Your feet should be slightly apart, positioned just under your hips. Focus your eyes directly in front of you.  this position for a few seconds before starting your exercise. This helps increase your awareness of proper posture.          Reach overhead and slightly back with both arms. Keep your shoulders and neck aligned and your elbows behind your shoulders:    With your palms facing the ceiling, turn your  fingers inward.    Take a deep breath. Breathe out, and lower your elbows toward your buttocks. Hold for 5 seconds, then return to starting position.    Repeat 3 times.  Date Last Reviewed: 11/1/2017 2000-2019 [employer]. [address] [place] [address]. All rights reserved. This information is not intended as a substitute for professional medical care. Always follow your healthcare professional's instructions.           Patient Education     Shoulder Clock Exercise    To start, stand tall with your ears, shoulders, and hips in line. Your feet should be slightly apart, positioned just under your hips. Focus your eyes directly in front of you.  this position for a few seconds before starting your exercise. This helps increase your awareness of proper posture.    Imagine that your right shoulder is the center of a clock. With the outer point of your shoulder, roll it around to slowly trace the outer edge of the clock.    Move clockwise first, then counterclockwise.    Repeat 3 to 5 times. Switch shoulders.  Date Last Reviewed: 3/1/2018    9782-5360 [employer]. [address] [place] [address]. All rights reserved. This information is not intended as a substitute for professional medical care. Always follow your healthcare professional's instructions.           Patient Education     Shoulder Exercises    To start, sit in a chair with your feet flat on the floor. Your weight should be slightly forward so that you re balanced evenly on your buttocks. Relax your shoulders and keep your head level. Avoid arching your back or rounding your shoulders. Using a chair with arms may help you keep your balance.    Raise your arms, elbows bent, to shoulder height.    Slowly move your forearms together. Hold for 5 seconds.    Return to starting position. Repeat 5 times.  Date Last Reviewed: 3/1/2018    6117-2512 [employer]. [address] [place] [address]  [place] [address]. All rights reserved. This information is not intended as a substitute for professional medical care. Always follow your healthcare professional's instructions.           Patient Education     Shoulder Shrug Exercise    To start, sit in a chair with your feet flat on the floor. Shift your weight slightly forward to avoid rounding your back. Relax. Keep your ears, shoulders, and hips aligned:    Raise both of your shoulders as high as you can, as if you were trying to touch them to your ears. Keep your head and neck still and relaxed.    Hold for a count of 10. Release.    Repeat 5 times.  For your safety, check with your healthcare provider before starting an exercise program.  Date Last Reviewed: 11/1/2017 2000-2019 [employer]. [address] [place] [address]. All rights reserved. This information is not intended as a substitute for professional medical care. Always follow your healthcare professional's instructions.           Patient Education     Shoulder Squeeze Exercise    To start, sit in a chair with your feet flat on the floor. Shift your weight slightly forward to avoid rounding your back. Relax. Keep your ears, shoulders, and hips aligned:    Raise your arms to shoulder height, elbows bent and palms forward.    Move your arms back, squeezing your shoulder blades together.    Hold for 10 seconds. Return to starting position.     Repeat 5 times.   For your safety, check with your healthcare provider before starting an exercise program.   Date Last Reviewed: 11/1/2017 2000-2019 [employer]. [address] [place] [address]. All rights reserved. This information is not intended as a substitute for professional medical care. Always follow your healthcare professional's instructions.           Patient Education     Neck Exercises: Active Neck Rotation    To start, lie on your back, knees bent and feet flat on the floor. Keep your ears, shoulders, and  hips aligned, but don t press your lower back to the floor. Rest your hands on your pelvis. Breathe deeply and relax.  Here are the steps for the active neck rotation:    Use your neck muscles to turn your head to one side until you feel a stretch in the muscles.    Hold for 5 seconds. Then turn to the other side.    Repeat 5 times on each side.  Note: Keep your shoulders on the floor. Don t lift or tuck your chin as you turn your head.  Date Last Reviewed: 11/1/2017 2000-2019 [employer]. [address] [place] [address]. All rights reserved. This information is not intended as a substitute for professional medical care. Always follow your healthcare professional's instructions.

## 2020-01-20 NOTE — PROGRESS NOTES
Surgical Office Location :   [facility]  [address] [place] [address]

## 2020-01-22 ENCOUNTER — OFFICE VISIT (OUTPATIENT)
Dept: DERMATOLOGY | Facility: CLINIC | Age: 80
End: 2020-01-22
Payer: COMMERCIAL

## 2020-01-22 VITALS — SYSTOLIC BLOOD PRESSURE: 161 MMHG | DIASTOLIC BLOOD PRESSURE: 79 MMHG | HEART RATE: 66 BPM | RESPIRATION RATE: 16 BRPM

## 2020-01-22 DIAGNOSIS — C44.319 BASAL CELL CARCINOMA (BCC) OF RIGHT CHEEK: Primary | ICD-10-CM

## 2020-01-22 PROCEDURE — 13132 CMPLX RPR F/C/C/M/N/AX/G/H/F: CPT | Mod: 51 | Performed by: DERMATOLOGY

## 2020-01-22 PROCEDURE — 17311 MOHS 1 STAGE H/N/HF/G: CPT | Performed by: DERMATOLOGY

## 2020-01-22 NOTE — LETTER
1/22/2020         RE: Mya Hui  [address]  [facility] [id number]        Dear Colleague,    Thank you for referring your patient, Mya Hui, to the [facility]. Please see a copy of my visit note below.    Surgical Office Location :   [facility]  [address] [place] [address]      Mya Hui is a 79 year old year old female patient here today for evaluation and managment of basal cell carcinoma on right cheek. Patient has no other skin complaints today.  Remainder of the HPI, Meds, PMH, Allergies, FH, and SH was reviewed in chart.      Past Medical History:   Diagnosis Date     Acute cholecystitis 3/23/2018     Acute kidney injury (H) 9/10/2017     Arthritis      Dehydration 9/4/2017     Diabetes type 2, controlled (H)      Elevated lactic acid level 9/10/2017     GERD (gastroesophageal reflux disease)      History of renal calculi      HTN, goal below 140/90      Hyperlipidaemia LDL goal < 100      Hypokalemia 9/3/2017     Hypothyroid      Insomnia      Left carotid stenosis      Migraine      Motion sickness      PONV (postoperative nausea and vomiting)      Sciatica of right side 6/28/2013     Type 2 diabetes mellitus without complication, without long-term current use of insulin (H) 2/16/2017       Past Surgical History:   Procedure Laterality Date     BUNIONECTOMY      Right foot     CATARACT IOL, RT/LT Bilateral 2017     COLONOSCOPY  7/12/2013    Procedure: COLONOSCOPY;  Colonoscopy;  Surgeon: Giovany Espinoza MD;  Location: [facility]     COLONOSCOPY N/A 1/31/2019    Procedure: Combined Colonoscopy, Single Or Multiple Biopsy/Polypectomy By Biopsy;  Surgeon: Efren Osorio MD;  Location: [facility]     COLONOSCOPY WITH CO2 INSUFFLATION N/A 1/31/2019    Procedure: COLONOSCOPY WITH CO2 INSUFFLATION;  Surgeon: Efren Osorio MD;  Location: [facility]     FRACTURE TX, ANKLE RT/LT      Left ankle     HC CYSTOURETHROSCOPY W/  URETEROSCOPY &/OR PYELOSCOPY; W/ LITHOTRIPSY       HC REVISE MEDIAN N/CARPAL TUNNEL SURG      Right wrist     INJECT EPIDURAL LUMBAR Right 12/27/2018    Procedure: INJECT EPIDURAL LUMBAR right foraminal narrowing severe at L4-L5 and moderate at L5-S1;  Surgeon: Gilbert Mcclure MD;  Location: [facility]     INJECT JOINT SACROILIAC  4/10/2014    Procedure: INJECT JOINT SACROILIAC;  Sacroiliac Joint Injection;  Surgeon: Gilbert Mcclure MD;  Location: [facility]     INJECT JOINT SACROILIAC Left 4/11/2019    Procedure: INJECT JOINT SACROILIAC- LEFT;  Surgeon: Gilbert Mcclure MD;  [facility]     LAPAROSCOPIC CHOLECYSTECTOMY N/A 3/24/2018    Procedure: LAPAROSCOPIC CHOLECYSTECTOMY;  Laparoscopic Cholecystectomy;  Surgeon: Berry Allen DO;  Location: [facility]        Family History   Problem Relation Age of Onset     Hypertension Brother      Cerebrovascular Disease Brother      Diabetes Father      Cardiovascular Father      Diabetes Brother         Borderline     Breast Cancer Mother      Diabetes Brother      Blood Disease Brother      Cardiovascular Brother         MI       Social History     Socioeconomic History     Marital status:      Spouse name: Not on file     Number of children: Not on file     Years of education: Not on file     Highest education level: Not on file   Occupational History     Employer: RETIRED     Comment: 's office part time   Social Needs     Financial resource strain: Not on file     Food insecurity:     Worry: Not on file     Inability: Not on file     Transportation needs:     Medical: Not on file     Non-medical: Not on file   Tobacco Use     Smoking status: Never Smoker     Smokeless tobacco: Never Used   Substance and Sexual Activity     Alcohol use: Yes     Drug use: No     Sexual activity: Not Currently   Lifestyle     Physical activity:     Days per week: Not on file     Minutes per session: Not on file     Stress: Not on file   Relationships     Social connections:      Talks on phone: Not on file     Gets together: Not on file     Attends Baptism service: Not on file     Active member of club or organization: Not on file     Attends meetings of clubs or organizations: Not on file     Relationship status: Not on file     Intimate partner violence:     Fear of current or ex partner: Not on file     Emotionally abused: Not on file     Physically abused: Not on file     Forced sexual activity: Not on file   Other Topics Concern     Parent/sibling w/ CABG, MI or angioplasty before 65F 55M? Not Asked   Social History Narrative     Not on file       Outpatient Encounter Medications as of 1/22/2020   Medication Sig Dispense Refill     ACCU-CHEK COMPACT PLUS test strip USE TO TEST BLOOD BLOOD SUGARS ONCE DAILY OR AS DIRECTED 102 each 3     blood glucose monitoring (SOFTCLIX) lancets USE TO TEST BLOOD SUGARS ONCE DAILY OR AS DIRECTED 100 each 11     cholestyramine (QUESTRAN) 4 GM/DOSE powder Take 4 g by mouth 2 times daily (with meals) 378 g 11     diltiazem ER COATED BEADS (CARTIA XT) 120 MG 24 hr capsule Take 1 capsule (120 mg) by mouth 2 times daily 180 capsule 3     Incontinence Supply Disposable (PROTECTIVE UNDERWEAR SUPER LG) MISC 2 each daily 64 each 11     levothyroxine (SYNTHROID/LEVOTHROID) 75 MCG tablet Take 1 tablet (75 mcg) by mouth daily 90 tablet 3     lisinopril (PRINIVIL/ZESTRIL) 40 MG tablet Take 1 tablet (40 mg) by mouth daily 90 tablet 3     mirabegron (MYRBETRIQ) 50 MG 24 hr tablet Take 1 tablet (50 mg) by mouth daily 90 tablet 3     Multiple Vitamins-Minerals (MULTIVITAL PO) Take  by mouth. daily       order for DME Equipment being ordered: Nike shoes and insoles 2 Units 1     oxybutynin ER (DITROPAN-XL) 10 MG 24 hr tablet Take 1 tablet (10 mg) by mouth daily 90 tablet 3     pantoprazole (PROTONIX) 40 MG EC tablet Take 1 tablet (40 mg) by mouth 2 times daily 180 tablet 3     penciclovir (DENAVIR) 1 % external cream Apply topically every 2 hours as needed for cold  sores 1.5 g 3     potassium chloride ER (K-TAB/KLOR-CON) 10 MEQ CR tablet Take 1 tablet (10 mEq) by mouth 3 times daily 270 tablet 3     pravastatin (PRAVACHOL) 40 MG tablet Take 1 tablet (40 mg) by mouth daily 90 tablet 3     [DISCONTINUED] acetaminophen (TYLENOL) 325 MG tablet Take 1,000 mg by mouth every 4 hours as needed for mild pain  100 tablet      [DISCONTINUED] blood glucose (NO BRAND SPECIFIED) lancets standard Glucose test strips  Use to test blood sugar 1 times daily or as directed. 1 Box 11     [DISCONTINUED] CARTIA  MG 24 hr capsule TAKE ONE CAPSULE BY MOUTH TWICE DAILY 180 capsule 2     [DISCONTINUED] levothyroxine (SYNTHROID/LEVOTHROID) 75 MCG tablet TAKE ONE TABLET BY MOUTH ONCE DAILY 60 tablet 8     [DISCONTINUED] lisinopril (PRINIVIL/ZESTRIL) 40 MG tablet TAKE ONE TABLET BY MOUTH ONCE DAILY 90 tablet 1     [DISCONTINUED] pantoprazole (PROTONIX) 40 MG EC tablet TAKE ONE TABLET BY MOUTH TWICE A DAY - TAKE 30 TO 60 MINUTES BEFORE A MEAL 180 tablet 3     [DISCONTINUED] potassium chloride ER (K-TAB/KLOR-CON) 10 MEQ CR tablet TAKE ONE TABLET BY MOUTH THREE TIMES A DAY 90 tablet 11     [DISCONTINUED] pravastatin (PRAVACHOL) 40 MG tablet TAKE ONE TABLET BY MOUTH ONCE DAILY 30 tablet 8     Facility-Administered Encounter Medications as of 1/22/2020   Medication Dose Route Frequency Provider Last Rate Last Dose     triamcinolone (KENALOG-40) injection 40 mg  40 mg   Nereyda Cohen MD   40 mg at 10/11/19 1134             Review Of Systems  Skin: As above  Eyes: negative  Ears/Nose/Throat: negative  Respiratory: No shortness of breath, dyspnea on exertion, cough, or hemoptysis  Cardiovascular: negative  Gastrointestinal: negative  Genitourinary: negative  Musculoskeletal: negative  Neurologic: negative  Psychiatric: negative  Hematologic/Lymphatic/Immunologic: negative  Endocrine: negative      O:   NAD, WDWN, Alert & Oriented, Mood & Affect wnl, Vitals stable   Here today alone   BP (!)  161/79 (BP Location: Left arm, Patient Position: Sitting, Cuff Size: Adult Large)   Pulse 66   Resp 16    General appearance normal   Vitals stable   Alert, oriented and in no acute distress     R cheek 6mm pink pearly papule       Eyes: Conjunctivae/lids:Normal     ENT: Lips, buccal mucosa, tongue: normal    MSK:Normal    Cardiovascular: peripheral edema none    Pulm: Breathing Normal    Lymph Nodes: No Head and Neck Lymphadenopathy     Neuro/Psych: Orientation:Alert and Orientedx3 ; Mood/Affect:normal       A/P:  1. R cheek basal cell carcinoma   MOHS:   Location    After PGACAC discussed with patient, decision for Mohs surgery was made. Indication for Mohs was Location. Patient confirmed the site with Dr. Leblanc.  After anesthesia with LEC, the tumor was excised using standard Mohs technique in 1 stages(s).  CLEAR MARGINS OBTAINED and Final defect size was 1.2 x 0.9 cm.       REPAIR COMPLEX: Because of the tightness of the surrounding skin and Because of the size and full thickness nature of the defect, a complex closure was planned. After LEC anesthesia and prep, Burow's triangles were excised in the relaxed skin tension lines. The wound edges were widely undermined by dissection in the subcutaneous plane until adequate tissue mobility was obtained. Hemostasis was obtained. The wound edges were closed in a layered fashion using Vicryl and Fast Absorbing Plain Gut sutures. Postoperative length was 3.5 cm.   EBL minimal; complications none; wound care routine.  The patient was discharged in good condition and will return in one week for wound evaluation.  BENIGN LESIONS DISCUSSED WITH PATIENT:  I discussed the specifics of tumor, prognosis, and genetics of benign lesions.  I explained that treatment of these lesions would be purely cosmetic and not medically neccessary.  I discussed with patient different removal options including excision, cautery and /or laser.      Nature and genetics of benign skin lesions  dicussed with patient.  Signs and Symptoms of skin cancer discussed with patient.  ABCDEs of melanoma reviewed with patient.  Patient encouraged to perform monthly skin exams.  UV precautions reviewed with patient.  Patient to follow up with Primary Care provider regarding elevated blood pressure.  Skin care regimen reviewed with patient: Eliminate harsh soaps, i.e. Dial, zest, irsih spring; Mild soaps such as Cetaphil or Dove sensitive skin, avoid hot or cold showers, aggressive use of emollients including vanicream, cetaphil or cerave discussed with patient.    Risks of non-melanoma skin cancer discussed with patient   Return to clinic 6 months      Again, thank you for allowing me to participate in the care of your patient.        Sincerely,        Reuben Leblanc MD

## 2020-01-22 NOTE — PROGRESS NOTES
Mya Hui is a 79 year old year old female patient here today for evaluation and managment of basal cell carcinoma on right cheek. Patient has no other skin complaints today.  Remainder of the HPI, Meds, PMH, Allergies, FH, and SH was reviewed in chart.      Past Medical History:   Diagnosis Date     Acute cholecystitis 3/23/2018     Acute kidney injury (H) 9/10/2017     Arthritis      Dehydration 9/4/2017     Diabetes type 2, controlled (H)      Elevated lactic acid level 9/10/2017     GERD (gastroesophageal reflux disease)      History of renal calculi      HTN, goal below 140/90      Hyperlipidaemia LDL goal < 100      Hypokalemia 9/3/2017     Hypothyroid      Insomnia      Left carotid stenosis      Migraine      Motion sickness      PONV (postoperative nausea and vomiting)      Sciatica of right side 6/28/2013     Type 2 diabetes mellitus without complication, without long-term current use of insulin (H) 2/16/2017       Past Surgical History:   Procedure Laterality Date     BUNIONECTOMY      Right foot     CATARACT IOL, RT/LT Bilateral 2017     COLONOSCOPY  7/12/2013    Procedure: COLONOSCOPY;  Colonoscopy;  Surgeon: Giovany Espinoza MD;  [facility]: [facility]     COLONOSCOPY N/A 1/31/2019    Procedure: Combined Colonoscopy, Single Or Multiple Biopsy/Polypectomy By Biopsy;  Surgeon: Efren Osorio MD;  Location: [facility]     COLONOSCOPY WITH CO2 INSUFFLATION N/A 1/31/2019    Procedure: COLONOSCOPY WITH CO2 INSUFFLATION;  Surgeon: Efren Osorio MD;  Location: [facility]     FRACTURE TX, ANKLE RT/LT      Left ankle     HC CYSTOURETHROSCOPY W/ URETEROSCOPY &/OR PYELOSCOPY; W/ LITHOTRIPSY       HC REVISE MEDIAN N/CARPAL TUNNEL SURG      Right wrist     INJECT EPIDURAL LUMBAR Right 12/27/2018    Procedure: INJECT EPIDURAL LUMBAR right foraminal narrowing severe at L4-L5 and moderate at L5-S1;  Surgeon: Gilbert Mcclure MD;  [facility]: [facility]     INJECT JOINT SACROILIAC  4/10/2014    Procedure:  INJECT JOINT SACROILIAC;  Sacroiliac Joint Injection;  Surgeon: Gilbert Mcclure MD;  [facility]: [facility]     INJECT JOINT SACROILIAC Left 4/11/2019    Procedure: INJECT JOINT SACROILIAC- LEFT;  Surgeon: Gilbert Mcclure MD;  [facility]     LAPAROSCOPIC CHOLECYSTECTOMY N/A 3/24/2018    Procedure: LAPAROSCOPIC CHOLECYSTECTOMY;  Laparoscopic Cholecystectomy;  Surgeon: Berry Allen DO;  Location: [facility]        Family History   Problem Relation Age of Onset     Hypertension Brother      Cerebrovascular Disease Brother      Diabetes Father      Cardiovascular Father      Diabetes Brother         Borderline     Breast Cancer Mother      Diabetes Brother      Blood Disease Brother      Cardiovascular Brother         MI       Social History     Socioeconomic History     Marital status:      Spouse name: Not on file     Number of children: Not on file     Years of education: Not on file     Highest education level: Not on file   Occupational History     Employer: RETIRED     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   Social Needs     Financial resource strain: Not on file     Food insecurity:     Worry: Not on file     Inability: Not on file     Transportation needs:     Medical: Not on file     Non-medical: Not on file   Tobacco Use     Smoking status: Never Smoker     Smokeless tobacco: Never Used   Substance and Sexual Activity     Alcohol use: Yes     Drug use: No     Sexual activity: Not Currently   Lifestyle     Physical activity:     Days per week: Not on file     Minutes per session: Not on file     Stress: Not on file   Relationships     Social connections:     Talks on phone: Not on file     Gets together: Not on file     Attends Sikhism service: Not on file     Active member of club or organization: Not on file     Attends meetings of clubs or organizations: Not on file     Relationship status: Not on file     Intimate partner violence:     Fear of current or ex partner: Not on file     Emotionally  abused: Not on file     Physically abused: Not on file     Forced sexual activity: Not on file   Other Topics Concern     Parent/sibling w/ CABG, MI or angioplasty before 65F 55M? Not Asked   Social History Narrative     Not on file       Outpatient Encounter Medications as of 1/22/2020   Medication Sig Dispense Refill     ACCU-CHEK COMPACT PLUS test strip USE TO TEST BLOOD BLOOD SUGARS ONCE DAILY OR AS DIRECTED 102 each 3     blood glucose monitoring (SOFTCLIX) lancets USE TO TEST BLOOD SUGARS ONCE DAILY OR AS DIRECTED 100 each 11     cholestyramine (QUESTRAN) 4 GM/DOSE powder Take 4 g by mouth 2 times daily (with meals) 378 g 11     diltiazem ER COATED BEADS (CARTIA XT) 120 MG 24 hr capsule Take 1 capsule (120 mg) by mouth 2 times daily 180 capsule 3     Incontinence Supply Disposable (PROTECTIVE UNDERWEAR SUPER LG) MISC 2 each daily 64 each 11     levothyroxine (SYNTHROID/LEVOTHROID) 75 MCG tablet Take 1 tablet (75 mcg) by mouth daily 90 tablet 3     lisinopril (PRINIVIL/ZESTRIL) 40 MG tablet Take 1 tablet (40 mg) by mouth daily 90 tablet 3     mirabegron (MYRBETRIQ) 50 MG 24 hr tablet Take 1 tablet (50 mg) by mouth daily 90 tablet 3     Multiple Vitamins-Minerals (MULTIVITAL PO) Take  by mouth. daily       order for DME Equipment being ordered: Nike shoes and insoles 2 Units 1     oxybutynin ER (DITROPAN-XL) 10 MG 24 hr tablet Take 1 tablet (10 mg) by mouth daily 90 tablet 3     pantoprazole (PROTONIX) 40 MG EC tablet Take 1 tablet (40 mg) by mouth 2 times daily 180 tablet 3     penciclovir (DENAVIR) 1 % external cream Apply topically every 2 hours as needed for cold sores 1.5 g 3     potassium chloride ER (K-TAB/KLOR-CON) 10 MEQ CR tablet Take 1 tablet (10 mEq) by mouth 3 times daily 270 tablet 3     pravastatin (PRAVACHOL) 40 MG tablet Take 1 tablet (40 mg) by mouth daily 90 tablet 3     [DISCONTINUED] acetaminophen (TYLENOL) 325 MG tablet Take 1,000 mg by mouth every 4 hours as needed for mild pain  100  tablet      [DISCONTINUED] blood glucose (NO BRAND SPECIFIED) lancets standard Glucose test strips  Use to test blood sugar 1 times daily or as directed. 1 Box 11     [DISCONTINUED] CARTIA  MG 24 hr capsule TAKE ONE CAPSULE BY MOUTH TWICE DAILY 180 capsule 2     [DISCONTINUED] levothyroxine (SYNTHROID/LEVOTHROID) 75 MCG tablet TAKE ONE TABLET BY MOUTH ONCE DAILY 60 tablet 8     [DISCONTINUED] lisinopril (PRINIVIL/ZESTRIL) 40 MG tablet TAKE ONE TABLET BY MOUTH ONCE DAILY 90 tablet 1     [DISCONTINUED] pantoprazole (PROTONIX) 40 MG EC tablet TAKE ONE TABLET BY MOUTH TWICE A DAY - TAKE 30 TO 60 MINUTES BEFORE A MEAL 180 tablet 3     [DISCONTINUED] potassium chloride ER (K-TAB/KLOR-CON) 10 MEQ CR tablet TAKE ONE TABLET BY MOUTH THREE TIMES A DAY 90 tablet 11     [DISCONTINUED] pravastatin (PRAVACHOL) 40 MG tablet TAKE ONE TABLET BY MOUTH ONCE DAILY 30 tablet 8     Facility-Administered Encounter Medications as of 1/22/2020   Medication Dose Route Frequency Provider Last Rate Last Dose     triamcinolone (KENALOG-40) injection 40 mg  40 mg   Nereyda Cohen MD   40 mg at 10/11/19 1134             Review Of Systems  Skin: As above  Eyes: negative  Ears/Nose/Throat: negative  Respiratory: No shortness of breath, dyspnea on exertion, cough, or hemoptysis  Cardiovascular: negative  Gastrointestinal: negative  Genitourinary: negative  Musculoskeletal: negative  Neurologic: negative  Psychiatric: negative  Hematologic/Lymphatic/Immunologic: negative  Endocrine: negative      O:   NAD, WDWN, Alert & Oriented, Mood & Affect wnl, Vitals stable   Here today alone   BP (!) 161/79 (BP Location: Left arm, Patient Position: Sitting, Cuff Size: Adult Large)   Pulse 66   Resp 16    General appearance normal   Vitals stable   Alert, oriented and in no acute distress     R cheek 6mm pink pearly papule       Eyes: Conjunctivae/lids:Normal     ENT: Lips, buccal mucosa, tongue: normal    MSK:Normal    Cardiovascular:  peripheral edema none    Pulm: Breathing Normal    Lymph Nodes: No Head and Neck Lymphadenopathy     Neuro/Psych: Orientation:Alert and Orientedx3 ; Mood/Affect:normal       A/P:  1. R cheek basal cell carcinoma   MOHS:   Location    After PGACAC discussed with patient, decision for Mohs surgery was made. Indication for Mohs was Location. Patient confirmed the site with Dr. Leblanc.  After anesthesia with LEC, the tumor was excised using standard Mohs technique in 1 stages(s).  CLEAR MARGINS OBTAINED and Final defect size was 1.2 x 0.9 cm.       REPAIR COMPLEX: Because of the tightness of the surrounding skin and Because of the size and full thickness nature of the defect, a complex closure was planned. After LEC anesthesia and prep, Burow's triangles were excised in the relaxed skin tension lines. The wound edges were widely undermined by dissection in the subcutaneous plane until adequate tissue mobility was obtained. Hemostasis was obtained. The wound edges were closed in a layered fashion using Vicryl and Fast Absorbing Plain Gut sutures. Postoperative length was 3.5 cm.   EBL minimal; complications none; wound care routine.  The patient was discharged in good condition and will return in one week for wound evaluation.  BENIGN LESIONS DISCUSSED WITH PATIENT:  I discussed the specifics of tumor, prognosis, and genetics of benign lesions.  I explained that treatment of these lesions would be purely cosmetic and not medically neccessary.  I discussed with patient different removal options including excision, cautery and /or laser.      Nature and genetics of benign skin lesions dicussed with patient.  Signs and Symptoms of skin cancer discussed with patient.  ABCDEs of melanoma reviewed with patient.  Patient encouraged to perform monthly skin exams.  UV precautions reviewed with patient.  Patient to follow up with Primary Care provider regarding elevated blood pressure.  Skin care regimen reviewed with patient:  Eliminate harsh soaps, i.e. Dial, zest, irsih spring; Mild soaps such as Cetaphil or Dove sensitive skin, avoid hot or cold showers, aggressive use of emollients including vanicream, cetaphil or cerave discussed with patient.    Risks of non-melanoma skin cancer discussed with patient   Return to clinic 6 months

## 2020-01-22 NOTE — NURSING NOTE
"Initial BP (!) 161/79 (BP Location: Left arm, Patient Position: Sitting, Cuff Size: Adult Large)   Pulse 66   Resp 16  Estimated body mass index is 32.78 kg/m  as calculated from the following:    Height as of 12/23/19: 1.651 m (5' 5\").    Weight as of 1/20/20: 89.4 kg (197 lb). .    Annamaria Miller, [facility]    "

## 2020-01-22 NOTE — PATIENT INSTRUCTIONS
Sutured Wound Care     [facility]: [phone]    [facility]: [phone]          ? No strenuous activity for 48 hours. Resume moderate activity in 48 hours. No heavy exercising until you are seen for follow up in one week.     ? Take Tylenol as needed for discomfort.                         ? Do not drink alcoholic beverages for 48 hours.     ? Keep the pressure bandage in place for 24 hours. If the bandage becomes blood tinged or loose, reinforce it with gauze and tape.        (Refer to the reverse side of this page for management of bleeding).    ? Remove pressure bandage in 24 hours     ? Leave the flat bandage in place until your follow up appointment.    ? Keep the bandage dry. Wash around it carefully.    ? If the tape becomes soiled or starts to come off, reinforce it with additional paper tape.    ? Do not smoke for 3 weeks; smoking is detrimental to wound healing.    ? It is normal to have swelling and bruising around the surgical site. The bruising will fade in approximately 10-14 days. Elevate the area to reduce swelling.    ? Numbness, itchiness and sensitivity to temperature changes can occur after surgery and may take up to 18 months to normalize.      POSSIBLE COMPLICATIONS    BLEEDIN. Leave the bandage in place.  2. Use tightly rolled up gauze or a cloth to apply direct pressure over the bandage for 20   minutes.  3. Reapply pressure for an additional 20 minutes if necessary  4. Call the office or go to the nearest emergency room if pressure fails to stop the bleeding.  5. Use additional gauze and tape to maintain pressure once the bleeding has stopped.        PAIN:    1. Post operative pain should slowly get better, never worse.  2. A severe increase in pain may indicate a problem. Call the office if this occurs.    In case of emergency phone:Dr Leblanc [phone]

## 2020-01-29 ENCOUNTER — ALLIED HEALTH/NURSE VISIT (OUTPATIENT)
Dept: DERMATOLOGY | Facility: CLINIC | Age: 80
End: 2020-01-29
Payer: COMMERCIAL

## 2020-01-29 DIAGNOSIS — Z48.01 ENCOUNTER FOR CHANGE OR REMOVAL OF SURGICAL WOUND DRESSING: Primary | ICD-10-CM

## 2020-01-29 PROCEDURE — 99207 ZZC NO CHARGE NURSE ONLY: CPT

## 2020-01-29 NOTE — PROGRESS NOTES
Pt returned to clinic for post surgery 1 week follow up bandage change. Pt has no complaints, denies pain. Bandage removed from right cheek, area cleansed with normal saline. Site is healing and wound edges approximating well. Reapplied new steri strips and paper tape.    Advised to watch for signs/sx of infection; spreading redness, drainage, odor, fever. Call or report promptly to clinic. Pt given written instructions and informed to rtc as needed. Patient verbalized understanding.     Connie Gregory, CMA

## 2020-01-29 NOTE — PATIENT INSTRUCTIONS

## 2020-02-28 ENCOUNTER — TRANSFERRED RECORDS (OUTPATIENT)
Dept: HEALTH INFORMATION MANAGEMENT | Facility: CLINIC | Age: 80
End: 2020-02-28

## 2020-02-28 LAB — RETINOPATHY: NEGATIVE

## 2020-03-09 NOTE — PROGRESS NOTES
Sports Medicine Clinic Visit - Interim History March 9, 2020      PCP: Kisha Stinson    Mya Hui is a 80 year old female who is seen in follow up for Chronic right shoulder pain. Since last visit on 10/11/2019 patient has began to have pain over the past 2-3 weeks.  She has began to do chair exercises at her living facility and is wondering if this is contributing to her return to pain. She notes it feels like the shoulder is catching with movement. She notes when the pain gets bad, the pain goes down the arm and begins to tingle. She denies pain in the neck. She rates the pain at a   3/10 currently.  Symptoms are relieved with , heatTylenol and past steroid injections.  Symptoms are worsened by movement of the sholder. Injection received by Dr. Cohen on 10/11/19, along with 2 injections with Dr. Carpio prior to last visit. She is hoping for a steroid injection today.       Review of Systems  Musculoskeletal: as above  Remainder of review of systems is negative including constitutional, eyes, ENT, CV, pulmonary, GI, , endocrine, skin, hematologic, and neurologic except as noted in HPI or medical history.    History reviewed. No pertinent past surgical/medical/family/social history other than as mentioned in HPI.    Patient Active Problem List   Diagnosis     Advance Care Planning     Arthritis     Nevus     Asymptomatic carotid artery stenosis     Hyperlipidemia with target LDL less than 100     History of renal calculi     Sciatica of right side     Hip pain     Hearing aid worn     Osteoarthritis of hip     DDD (degenerative disc disease), lumbar     OAB (overactive bladder)     Essential hypertension with goal blood pressure less than 140/90     Hypothyroidism, unspecified type     Insomnia, unspecified     Type 2 diabetes mellitus without complication, without long-term current use of insulin (H)     Cataract, bilateral     Primary osteoarthritis involving multiple joints      Chronic right shoulder pain     Right shoulder pain, unspecified chronicity     Dry mouth     Rotator cuff arthropathy, right     Sliding hiatal hernia     Calculus of gallbladder without cholecystitis     Gastroesophageal reflux disease, esophagitis presence not specified     S/P laparoscopic cholecystectomy     Toe anomaly     Cherry angioma     Urinary incontinence, unspecified type     Bilateral lower extremity edema     Diabetes mellitus with peripheral vascular disease (H)     CKD (chronic kidney disease) stage 3, GFR 30-59 ml/min (H)     Past Medical History:   Diagnosis Date     Acute cholecystitis 3/23/2018     Acute kidney injury (H) 9/10/2017     Arthritis      Dehydration 9/4/2017     Diabetes type 2, controlled (H)      Elevated lactic acid level 9/10/2017     GERD (gastroesophageal reflux disease)      History of renal calculi      HTN, goal below 140/90      Hyperlipidaemia LDL goal < 100      Hypokalemia 9/3/2017     Hypothyroid      Insomnia      Left carotid stenosis      Migraine      Motion sickness      PONV (postoperative nausea and vomiting)      Sciatica of right side 6/28/2013     Type 2 diabetes mellitus without complication, without long-term current use of insulin (H) 2/16/2017     Past Surgical History:   Procedure Laterality Date     BUNIONECTOMY      Right foot     CATARACT IOL, RT/LT Bilateral 2017     COLONOSCOPY  7/12/2013    Procedure: COLONOSCOPY;  Colonoscopy;  Surgeon: Giovany Espinoza MD;  [facility]     COLONOSCOPY N/A 1/31/2019    Procedure: Combined Colonoscopy, Single Or Multiple Biopsy/Polypectomy By Biopsy;  Surgeon: Efren Osorio MD;  [facility]     COLONOSCOPY WITH CO2 INSUFFLATION N/A 1/31/2019    Procedure: COLONOSCOPY WITH CO2 INSUFFLATION;  Surgeon: Efren Osorio MD;  [facility]     FRACTURE TX, ANKLE RT/LT      Left ankle     HC CYSTOURETHROSCOPY W/ URETEROSCOPY &/OR PYELOSCOPY; W/ LITHOTRIPSY       HC REVISE MEDIAN  N/CARPAL TUNNEL SURG      Right wrist     INJECT EPIDURAL LUMBAR Right 12/27/2018    Procedure: INJECT EPIDURAL LUMBAR right foraminal narrowing severe at L4-L5 and moderate at L5-S1;  Surgeon: Gilbert Mcclure MD;  [facility]     INJECT JOINT SACROILIAC  4/10/2014    Procedure: INJECT JOINT SACROILIAC;  Sacroiliac Joint Injection;  Surgeon: Gilbert Mcclure MD;  [facility]     INJECT JOINT SACROILIAC Left 4/11/2019    Procedure: INJECT JOINT SACROILIAC- LEFT;  Surgeon: Gilbert Mcclure MD;  [facility]     LAPAROSCOPIC CHOLECYSTECTOMY N/A 3/24/2018    Procedure: LAPAROSCOPIC CHOLECYSTECTOMY;  Laparoscopic Cholecystectomy;  Surgeon: Berry Allen DO;  [facility]     Family History   Problem Relation Age of Onset     Hypertension Brother      Cerebrovascular Disease Brother      Diabetes Father      Cardiovascular Father      Diabetes Brother         Borderline     Breast Cancer Mother      Diabetes Brother      Blood Disease Brother      Cardiovascular Brother         MI     Social History     Socioeconomic History     Marital status:      Spouse name: Not on file     Number of children: Not on file     Years of education: Not on file     Highest education level: Not on file   Occupational History     Employer: RETIRED     Comment: [employer] office part time   Social Needs     Financial resource strain: Not on file     Food insecurity     Worry: Not on file     Inability: Not on file     Transportation needs     Medical: Not on file     Non-medical: Not on file   Tobacco Use     Smoking status: Never Smoker     Smokeless tobacco: Never Used   Substance and Sexual Activity     Alcohol use: Yes     Drug use: No     Sexual activity: Not Currently   Lifestyle     Physical activity     Days per week: Not on file     Minutes per session: Not on file     Stress: Not on file   Relationships     Social connections     Talks on phone: Not on file     Gets together: Not on file     Attends  Latter-day service: Not on file     Active member of club or organization: Not on file     Attends meetings of clubs or organizations: Not on file     Relationship status: Not on file     Intimate partner violence     Fear of current or ex partner: Not on file     Emotionally abused: Not on file     Physically abused: Not on file     Forced sexual activity: Not on file   Other Topics Concern     Parent/sibling w/ CABG, MI or angioplasty before 65F 55M? Not Asked   Social History Narrative     Not on file       She is retired.     Current Outpatient Medications   Medication     ACCU-CHEK COMPACT PLUS test strip     blood glucose monitoring (SOFTCLIX) lancets     cholestyramine (QUESTRAN) 4 GM/DOSE powder     diltiazem ER COATED BEADS (CARTIA XT) 120 MG 24 hr capsule     Incontinence Supply Disposable (PROTECTIVE UNDERWEAR SUPER LG) MISC     levothyroxine (SYNTHROID/LEVOTHROID) 75 MCG tablet     lisinopril (PRINIVIL/ZESTRIL) 40 MG tablet     mirabegron (MYRBETRIQ) 50 MG 24 hr tablet     Multiple Vitamins-Minerals (MULTIVITAL PO)     order for DME     oxybutynin ER (DITROPAN-XL) 10 MG 24 hr tablet     pantoprazole (PROTONIX) 40 MG EC tablet     penciclovir (DENAVIR) 1 % external cream     potassium chloride ER (K-TAB/KLOR-CON) 10 MEQ CR tablet     pravastatin (PRAVACHOL) 40 MG tablet     Current Facility-Administered Medications   Medication     triamcinolone (KENALOG-40) injection 40 mg     Allergies   Allergen Reactions     Codeine Nausea     Fentanyl Nausea and Vomiting     VERY sensitive to most narcotics if not all.         Objective:  BP (!) 169/77   Wt 93 kg (205 lb)   BMI 34.11 kg/m      General: Alert and in no distress    Head: Normocephalic, atraumatic  Eyes: no scleral icterus or conjunctival erythema   Oropharynx:  Mucous membranes moist  Skin: no erythema, petechiae, or jaundice  CV: regular rhythm by palpation, 2+ distal pulses  Resp: normal respiratory effort without conversational dyspnea   Psych:  normal mood and affect    Gait: Uses a cane  Right shoulder:  -Rounded shoulders  -Tender over the right lateral cuff insertion and upper arm  -Active abduction 60 degrees, active flexion 90 degrees  -Passive full    Radiology:  Independent visualization of images performed  RIGHT SHOULDER THREE VIEWS  1/5/2018 10:51 AM      HISTORY:  Shoulder pain, right.     COMPARISON: 8/28/2017     FINDINGS: Mild glenohumeral and AC joint degenerative spurring similar  to previous.  The acromioclavicular and coracoclavicular distances  appear within normal limits.  The subacromial space is maintained.   There is no acute fracture or demonstrated dislocation.  There are no  worrisome bony lesions.                                                                        IMPRESSION:  No acute osseous abnormality demonstrated.     JORGE GARCIA MD    Large Joint Injection/Arthocentesis: R subacromial bursa    Date/Time: 3/10/2020 9:20 AM  Performed by: Nereyda Cohen MD  Authorized by: Nereyda Cohen MD     Indications:  Pain  Needle Size:  25 G  Guidance: landmark guided    Approach:  Posterior  Location:  Shoulder      Site:  R subacromial bursa  Medications:  40 mg triamcinolone 40 MG/ML  Medications comment:  4ml 0.5% bupivicaine  NDC:20510-769-46  Lot: [id number]  1/31/21    Outcome:  Tolerated well, no immediate complications  Procedure discussed: discussed risks, benefits, and alternatives    Consent Given by:  Patient          Assessment:  1. Chronic right shoulder pain        Plan:  Discussed the assessment with the patient and developed a plan together:  -Steroid injection performed today.  Take it easy over the next few days. Keep in mind that the steroid may take up to 3 days to start working and up to 2 weeks to reach maximal effect.  Ice 15-20 minutes as needed for soreness.  Patient's preferred over the counter medication as needed for pain as directed on packaging.  Advised that steroids can  temporarily raise the blood glucose and she should closely monitor her blood sugars over the next week.  Last Hgb A1C 5.5 on 11/20/2019.  -Avoid aggravating activities.  Do home exercises as able.    -Also discussed shoulder joint steroid injection with radiology    -Mya to follow up with Primary Care provider regarding elevated blood pressure.     -Follow up as needed if symptoms fail to improve or worsen.      Princess Cohen MD, CAQ Sports Medicine  [facility]

## 2020-03-10 ENCOUNTER — OFFICE VISIT (OUTPATIENT)
Dept: ORTHOPEDICS | Facility: CLINIC | Age: 80
End: 2020-03-10
Payer: COMMERCIAL

## 2020-03-10 VITALS — DIASTOLIC BLOOD PRESSURE: 77 MMHG | BODY MASS INDEX: 34.11 KG/M2 | WEIGHT: 205 LBS | SYSTOLIC BLOOD PRESSURE: 169 MMHG

## 2020-03-10 DIAGNOSIS — M25.511 CHRONIC RIGHT SHOULDER PAIN: Primary | ICD-10-CM

## 2020-03-10 DIAGNOSIS — G89.29 CHRONIC RIGHT SHOULDER PAIN: Primary | ICD-10-CM

## 2020-03-10 PROCEDURE — 20610 DRAIN/INJ JOINT/BURSA W/O US: CPT | Mod: RT | Performed by: PHYSICAL MEDICINE & REHABILITATION

## 2020-03-10 PROCEDURE — 99213 OFFICE O/P EST LOW 20 MIN: CPT | Mod: 25 | Performed by: PHYSICAL MEDICINE & REHABILITATION

## 2020-03-10 RX ORDER — TRIAMCINOLONE ACETONIDE 40 MG/ML
40 INJECTION, SUSPENSION INTRA-ARTICULAR; INTRAMUSCULAR
Status: DISCONTINUED | OUTPATIENT
Start: 2020-03-10 | End: 2020-05-13

## 2020-03-10 RX ADMIN — TRIAMCINOLONE ACETONIDE 40 MG: 40 INJECTION, SUSPENSION INTRA-ARTICULAR; INTRAMUSCULAR at 09:20

## 2020-03-10 NOTE — LETTER
3/10/2020         RE: Mya Hui  [address]  [facility] [address]        Dear Colleague,    Thank you for referring your patient, Mya Hui, to the [facility]. Please see a copy of my visit note below.    Sports Medicine Clinic Visit - Interim History March 9, 2020      PCP: Kisha Stinson    Mya Hui is a 80 year old female who is seen in follow up for Chronic right shoulder pain. Since last visit on 10/11/2019 patient has began to have pain over the past 2-3 weeks.  She has began to do chair exercises at her living facility and is wondering if this is contributing to her return to pain. She notes it feels like the shoulder is catching with movement. She notes when the pain gets bad, the pain goes down the arm and begins to tingle. She denies pain in the neck. She rates the pain at a   3/10 currently.  Symptoms are relieved with , heatTylenol and past steroid injections.  Symptoms are worsened by movement of the sholder. Injection received by Dr. Cohen on 10/11/19, along with 2 injections with Dr. Carpio prior to last visit. She is hoping for a steroid injection today.       Review of Systems  Musculoskeletal: as above  Remainder of review of systems is negative including constitutional, eyes, ENT, CV, pulmonary, GI, , endocrine, skin, hematologic, and neurologic except as noted in HPI or medical history.    History reviewed. No pertinent past surgical/medical/family/social history other than as mentioned in HPI.    Patient Active Problem List   Diagnosis     Advance Care Planning     Arthritis     Nevus     Asymptomatic carotid artery stenosis     Hyperlipidemia with target LDL less than 100     History of renal calculi     Sciatica of right side     Hip pain     Hearing aid worn     Osteoarthritis of hip     DDD (degenerative disc disease), lumbar     OAB (overactive bladder)     Essential hypertension with goal blood pressure less than  140/90     Hypothyroidism, unspecified type     Insomnia, unspecified     Type 2 diabetes mellitus without complication, without long-term current use of insulin (H)     Cataract, bilateral     Primary osteoarthritis involving multiple joints     Chronic right shoulder pain     Right shoulder pain, unspecified chronicity     Dry mouth     Rotator cuff arthropathy, right     Sliding hiatal hernia     Calculus of gallbladder without cholecystitis     Gastroesophageal reflux disease, esophagitis presence not specified     S/P laparoscopic cholecystectomy     Toe anomaly     Cherry angioma     Urinary incontinence, unspecified type     Bilateral lower extremity edema     Diabetes mellitus with peripheral vascular disease (H)     CKD (chronic kidney disease) stage 3, GFR 30-59 ml/min (H)     Past Medical History:   Diagnosis Date     Acute cholecystitis 3/23/2018     Acute kidney injury (H) 9/10/2017     Arthritis      Dehydration 9/4/2017     Diabetes type 2, controlled (H)      Elevated lactic acid level 9/10/2017     GERD (gastroesophageal reflux disease)      History of renal calculi      HTN, goal below 140/90      Hyperlipidaemia LDL goal < 100      Hypokalemia 9/3/2017     Hypothyroid      Insomnia      Left carotid stenosis      Migraine      Motion sickness      PONV (postoperative nausea and vomiting)      Sciatica of right side 6/28/2013     Type 2 diabetes mellitus without complication, without long-term current use of insulin (H) 2/16/2017     Past Surgical History:   Procedure Laterality Date     BUNIONECTOMY      Right foot     CATARACT IOL, RT/LT Bilateral 2017     COLONOSCOPY  7/12/2013    Procedure: COLONOSCOPY;  Colonoscopy;  Surgeon: Giovany Espinoza MD;  [facility]     COLONOSCOPY N/A 1/31/2019    Procedure: Combined Colonoscopy, Single Or Multiple Biopsy/Polypectomy By Biopsy;  Surgeon: Efren Osorio MD;  [facility]     COLONOSCOPY WITH CO2 INSUFFLATION N/A 1/31/2019     Procedure: COLONOSCOPY WITH CO2 INSUFFLATION;  Surgeon: Efren Osorio MD;  [facility]: [facility]     FRACTURE TX, ANKLE RT/LT      Left ankle     HC CYSTOURETHROSCOPY W/ URETEROSCOPY &/OR PYELOSCOPY; W/ LITHOTRIPSY       HC REVISE MEDIAN N/CARPAL TUNNEL SURG      Right wrist     INJECT EPIDURAL LUMBAR Right 12/27/2018    Procedure: INJECT EPIDURAL LUMBAR right foraminal narrowing severe at L4-L5 and moderate at L5-S1;  Surgeon: Gilbert Mcclure MD;  [facility]     INJECT JOINT SACROILIAC  4/10/2014    Procedure: INJECT JOINT SACROILIAC;  Sacroiliac Joint Injection;  Surgeon: Gilbert Mcclure MD;  [facility]     INJECT JOINT SACROILIAC Left 4/11/2019    Procedure: INJECT JOINT SACROILIAC- LEFT;  Surgeon: Gilbert Mcclure MD;  [facility]     LAPAROSCOPIC CHOLECYSTECTOMY N/A 3/24/2018    Procedure: LAPAROSCOPIC CHOLECYSTECTOMY;  Laparoscopic Cholecystectomy;  Surgeon: Berry Allen DO;  [facility]     Family History   Problem Relation Age of Onset     Hypertension Brother      Cerebrovascular Disease Brother      Diabetes Father      Cardiovascular Father      Diabetes Brother         Borderline     Breast Cancer Mother      Diabetes Brother      Blood Disease Brother      Cardiovascular Brother         MI     Social History     Socioeconomic History     Marital status:      Spouse name: Not on file     Number of children: Not on file     Years of education: Not on file     Highest education level: Not on file   Occupational History     Employer: RETIRED     Comment: [employer] office part time   Social Needs     Financial resource strain: Not on file     Food insecurity     Worry: Not on file     Inability: Not on file     Transportation needs     Medical: Not on file     Non-medical: Not on file   Tobacco Use     Smoking status: Never Smoker     Smokeless tobacco: Never Used   Substance and Sexual Activity     Alcohol use: Yes     Drug use: No     Sexual activity: Not  Currently   Lifestyle     Physical activity     Days per week: Not on file     Minutes per session: Not on file     Stress: Not on file   Relationships     Social connections     Talks on phone: Not on file     Gets together: Not on file     Attends Mandaen service: Not on file     Active member of club or organization: Not on file     Attends meetings of clubs or organizations: Not on file     Relationship status: Not on file     Intimate partner violence     Fear of current or ex partner: Not on file     Emotionally abused: Not on file     Physically abused: Not on file     Forced sexual activity: Not on file   Other Topics Concern     Parent/sibling w/ CABG, MI or angioplasty before 65F 55M? Not Asked   Social History Narrative     Not on file       She is retired.     Current Outpatient Medications   Medication     ACCU-CHEK COMPACT PLUS test strip     blood glucose monitoring (SOFTCLIX) lancets     cholestyramine (QUESTRAN) 4 GM/DOSE powder     diltiazem ER COATED BEADS (CARTIA XT) 120 MG 24 hr capsule     Incontinence Supply Disposable (PROTECTIVE UNDERWEAR SUPER LG) MISC     levothyroxine (SYNTHROID/LEVOTHROID) 75 MCG tablet     lisinopril (PRINIVIL/ZESTRIL) 40 MG tablet     mirabegron (MYRBETRIQ) 50 MG 24 hr tablet     Multiple Vitamins-Minerals (MULTIVITAL PO)     order for DME     oxybutynin ER (DITROPAN-XL) 10 MG 24 hr tablet     pantoprazole (PROTONIX) 40 MG EC tablet     penciclovir (DENAVIR) 1 % external cream     potassium chloride ER (K-TAB/KLOR-CON) 10 MEQ CR tablet     pravastatin (PRAVACHOL) 40 MG tablet     Current Facility-Administered Medications   Medication     triamcinolone (KENALOG-40) injection 40 mg     Allergies   Allergen Reactions     Codeine Nausea     Fentanyl Nausea and Vomiting     VERY sensitive to most narcotics if not all.         Objective:  BP (!) 169/77   Wt 93 kg (205 lb)   BMI 34.11 kg/m      General: Alert and in no distress    Head: Normocephalic, atraumatic  Eyes: no  scleral icterus or conjunctival erythema   Oropharynx:  Mucous membranes moist  Skin: no erythema, petechiae, or jaundice  CV: regular rhythm by palpation, 2+ distal pulses  Resp: normal respiratory effort without conversational dyspnea   Psych: normal mood and affect    Gait: Uses a cane  Right shoulder:  -Rounded shoulders  -Tender over the right lateral cuff insertion and upper arm  -Active abduction 60 degrees, active flexion 90 degrees  -Passive full    Radiology:  Independent visualization of images performed  RIGHT SHOULDER THREE VIEWS  1/5/2018 10:51 AM      HISTORY:  Shoulder pain, right.     COMPARISON: 8/28/2017     FINDINGS: Mild glenohumeral and AC joint degenerative spurring similar  to previous.  The acromioclavicular and coracoclavicular distances  appear within normal limits.  The subacromial space is maintained.   There is no acute fracture or demonstrated dislocation.  There are no  worrisome bony lesions.                                                                        IMPRESSION:  No acute osseous abnormality demonstrated.     JORGE GARCIA MD    Large Joint Injection/Arthocentesis: R subacromial bursa    Date/Time: 3/10/2020 9:20 AM  Performed by: Nereyda Cohen MD  Authorized by: Nereyda Cohen MD     Indications:  Pain  Needle Size:  25 G  Guidance: landmark guided    Approach:  Posterior  Location:  Shoulder      Site:  R subacromial bursa  Medications:  40 mg triamcinolone 40 MG/ML  Medications comment:  4ml 0.5% bupivicaine  NDC:11728-374-18  Lot: [id number]  3/30/20    Outcome:  Tolerated well, no immediate complications  Procedure discussed: discussed risks, benefits, and alternatives    Consent Given by:  Patient          Assessment:  1. Chronic right shoulder pain        Plan:  Discussed the assessment with the patient and developed a plan together:  -Steroid injection performed today.  Take it easy over the next few days. Keep in mind that the steroid may  take up to 3 days to start working and up to 2 weeks to reach maximal effect.  Ice 15-20 minutes as needed for soreness.  Patient's preferred over the counter medication as needed for pain as directed on packaging.  Advised that steroids can temporarily raise the blood glucose and she should closely monitor her blood sugars over the next week.  Last Hgb A1C 5.5 on 11/20/2019.  -Avoid aggravating activities.  Do home exercises as able.    -Also discussed shoulder joint steroid injection with radiology    -Mya to follow up with Primary Care provider regarding elevated blood pressure.     -Follow up as needed if symptoms fail to improve or worsen.      Princess Cohen MD, [facility] Sports Medicine  [facility]      Again, thank you for allowing me to participate in the care of your patient.        Sincerely,        Nereyda Cohen MD

## 2020-03-10 NOTE — PROGRESS NOTES
Outpatient Physical Therapy Discharge Note     Patient: Mya Hui  : 1940    Beginning/End Dates of Reporting Period:  10/18/19 to 19    Referring Provider: Nereyda Cohen MD    Therapy Diagnosis: R shoulder pain with decreased shoulder strength, ROM; Impaired gait and balance. mechanical low back pain and L SI joint pain; Decreased B LE strength & flexibility     Client Self Report: No pain since initial visit with low back.  Shoulders are sore but doing much better.  Pt states with the drive and money she is going to continue with her HEP.  Does not reach into high shelves with R arm.  States she is walking more at home with 4WW.    Objective Measurements:  Objective Measure: STS x 5  Details: Did not retest time, but discussed her ability to increased the height of the chair to work on HEP sit to stand.  Objective Measure: TUG   Details: 13.5 seconds     Outcome Measures (most recent score):    Goals:  ORTHO GOALS   PT Ortho Eval Goals 1;2;3;4   Ortho Goal 1   Goal Identifier HEP   Goal Description Patient will report compliance with HEP at least 3 days per week in order to demonstrate improved self-management of pain.   Target Date 11/15/19   Ortho Goal 2   Goal Identifier SPADI   Goal Description Patient will improve SPADI score by at least 20 points in order to demonstrate functional improvements.   Target Date 19   Ortho Goal 3   Goal Identifier ROM   Goal Description Patient will exhibit R shoulder ROM equal to L to demonstrate improved mobility needed for daily activities such as reaching on high shelves or dressing self.   Target Date 19   Ortho Goal 4   Goal Identifier TUG   Goal Description Patient will demonstrate improvement in Timed Up and Go test using 4WW, with time of 13.5 seconds or less in order to exhibit improvements in functional balance and safe ambulation.   Target Date 19       Goal Identifier Ambulation    Goal Description Patient will report  ability to walk for 30 minutes at a time using 4WW or grocery cart without increase in LBP or Left SIJ pain above 2/10 in order to demonstrate improved tolerance to prolonged ambulation.    Target Date 12/30/19   Date Met  12/20/19   Progress:     Goal Identifier MAURICIO/Shelli   Goal Description Patient will improve MAURICIO score by at least 10% and Shelli to Low Risk in order to demonstrate functional improvements.   Target Date 01/27/20   Date Met     Progress: (22%)     Goal Identifier Functional strength   Goal Description Patient will perform 5 repetitions of sit<>stand without use of UE support in 20 seconds or less to demonstrate improvement in functional strength and balance.   Target Date 01/27/20   Date Met     Progress: (Struggles with lower chairs. )     Progress Toward Goals:     Plan:  Discharge from therapy.    Discharge:    Reason for Discharge: No further expectation of progress.    Equipment Issued:     Discharge Plan: Patient to continue home program.    Patient has not been seen in physical therapy for greater than 30 days.   Patient's episode of care is discharged and patient will require additional physical therapy evaluation to resume cares pertaining to treating diagnosis.  Patient not seen by this physical therapist and one of the treating physical therapist is currently not at the site.     Thank you for your referral.  Ivonne Colon, PT, DPT, ATC    [facility]  O: [phone]  E: [email or web address]

## 2020-03-10 NOTE — PATIENT INSTRUCTIONS
-Steroid injection performed today.  Take it easy over the next few days. Keep in mind that the steroid may take up to 3 days to start working and up to 2 weeks to reach maximal effect.  Ice 15-20 minutes as needed for soreness.  Patient's preferred over the counter medication as needed for pain as directed on packaging.  -Avoid aggravating activities.  Do home exercises as able.    -Also discussed shoulder joint steroid injection with radiology    -Mya to follow up with Primary Care provider regarding elevated blood pressure.     -Follow up as needed if symptoms fail to improve or worsen.

## 2020-03-17 ENCOUNTER — TELEPHONE (OUTPATIENT)
Dept: FAMILY MEDICINE | Facility: OTHER | Age: 80
End: 2020-03-17

## 2020-03-20 DIAGNOSIS — I10 ESSENTIAL HYPERTENSION WITH GOAL BLOOD PRESSURE LESS THAN 140/90: ICD-10-CM

## 2020-03-20 RX ORDER — LISINOPRIL 40 MG/1
40 TABLET ORAL DAILY
Qty: 90 TABLET | Refills: 3 | OUTPATIENT
Start: 2020-03-20

## 2020-03-20 NOTE — TELEPHONE ENCOUNTER
Reason for call:  Medication  Reason for Call:  Medication or medication refill:    Do you use a [facility]?  Name of the pharmacy and phone number for the current request:  [facility] - [phone]    Name of the medication requested: lisinopril (PRINIVIL/ZESTRIL) 40 MG tablet       Can we leave a detailed message on this number? YES    Phone number patient can be reached at: Home number on file [phone] (home) or Cell number on file:    Telephone Information:   Mobile [phone]       Best Time: anytime    Call taken on 3/20/2020 at 12:59 PM by Inocencia Penny

## 2020-03-20 NOTE — TELEPHONE ENCOUNTER
Pending Prescriptions:                       Disp   Refills    lisinopril (ZESTRIL) 40 MG tablet         90 tab*3            Sig: Take 1 tablet (40 mg) by mouth daily    Sent 1/20/20 with 1 year supply. Refill not appropriate at this time.     Irina Bland, MSN, RN

## 2020-03-23 ENCOUNTER — TELEPHONE (OUTPATIENT)
Dept: FAMILY MEDICINE | Facility: OTHER | Age: 80
End: 2020-03-23

## 2020-03-23 DIAGNOSIS — M53.3 SACROILIAC JOINT PAIN: Primary | ICD-10-CM

## 2020-03-23 NOTE — TELEPHONE ENCOUNTER
"She notes that she began having pain in the left SI joint pain last Friday. She denies any falls or injuries. She has been using 4% lidocaine and that helps for a couple hours. She is also getting temporary relief with ice as well.     She is wondering about muscle relaxers. She was prescribed Flexeril on 11/4/19 (does not recall if they were helpful).     She notes when she gets the \"zap\" of pain 9-10/10. She notes she can get this with just standing however movement causes pain as well. She gets relief/does not have pain if lying in a recliner or lying down.     I informed her that she is unlikely to be consider urgent for an injection right now.     I informed her I would refer to Dr. Cohen for medication management. Pharmacy selected in [place] - allergies and medications reviewed. Patient is diabetic.     Manuela Chung M.Ed., LAT, ATC  Clinic Coordinator for Dr. Princess Cohen    "

## 2020-03-23 NOTE — TELEPHONE ENCOUNTER
LVM for patient to return call to relay information below.     Manuela Chung M.Ed., LAT, ATC  Clinic Coordinator for Dr. Princess Cohen

## 2020-03-23 NOTE — TELEPHONE ENCOUNTER
Reason for Call:  Other call back    Detailed comments: Patient called with having extreme lower back pain with a lump. Patient inquiring if she should see Dr. Cohen again or if there is something else that is available.     Phone Number Patient can be reached at: Cell number on file:    Telephone Information:   Mobile [phone]       Best Time: ANY    Can we leave a detailed message on this number? YES    Call taken on 3/23/2020 at 10:00 AM by Teresa Pollock

## 2020-03-24 RX ORDER — LIDOCAINE 50 MG/G
1 PATCH TOPICAL EVERY 24 HOURS
Qty: 30 PATCH | Refills: 1 | Status: SHIPPED | OUTPATIENT
Start: 2020-03-24 | End: 2020-11-10

## 2020-03-24 NOTE — TELEPHONE ENCOUNTER
Called and relayed message per Dr. Cohen.      Patient is currently wearing lumbar support brace, icing, and using aspercreme.  She is requesting a prescription for lidocaine patch.  I explained that I would pass this along to Dr. Cohen for her recommendations.      I stated that we would update her by the end of the week. She expressed understanding.

## 2020-03-25 NOTE — TELEPHONE ENCOUNTER
Called and spoke with patient.  Stated that the patches were sent to her pharmacy. She will contact the clinic with any further questions or concerns.    Raven Jackson, ATC

## 2020-03-27 ENCOUNTER — TELEPHONE (OUTPATIENT)
Dept: ORTHOPEDICS | Facility: OTHER | Age: 80
End: 2020-03-27

## 2020-03-27 DIAGNOSIS — M51.369 LUMBAR DEGENERATIVE DISC DISEASE: ICD-10-CM

## 2020-03-27 DIAGNOSIS — M53.3 PAIN OF LEFT SACROILIAC JOINT: Primary | ICD-10-CM

## 2020-03-27 NOTE — TELEPHONE ENCOUNTER
Will trial muscle relaxant.  These can cause sedation so she should not take prior to driving.  Prescribed tizanidine.  She can take up to three times a day.  Sent to [facility] in [place].

## 2020-03-27 NOTE — TELEPHONE ENCOUNTER
Reason for Call:  Other appointment    Detailed comments: Daughter April is calling because she wanted to ask Dr. Cohen about increasing back pain/SI joint- Left hip pain that Mya has been having. They are trying to manage with lidocaine patches right now but wondering if there is anything else she can recommend for her? [phone]    Phone Number Patient can be reached      Best Time: any    Can we leave a detailed message on this number? YES    Call taken on 3/27/2020 at 12:15 PM by Mansi Cordova

## 2020-03-27 NOTE — TELEPHONE ENCOUNTER
Spoke to daughter and she knows that getting a cortisone injection for her mom is not really an option at this time. States the patient is having a hard time getting up and around. Is using the patches but only can wear for 12 hours. She mention something about taking tylenol.     Kimberley Kathleen ATC

## 2020-04-03 ENCOUNTER — TELEPHONE (OUTPATIENT)
Dept: FAMILY MEDICINE | Facility: OTHER | Age: 80
End: 2020-04-03

## 2020-04-03 DIAGNOSIS — E87.6 HYPOKALEMIA: ICD-10-CM

## 2020-04-03 NOTE — TELEPHONE ENCOUNTER
Called patient to reschedule appt, she asked if CDL could contact the pharmacy about her potassium. Says to take TID but states she was told she can do BID. Please advise. Thank you

## 2020-04-06 RX ORDER — POTASSIUM CHLORIDE 750 MG/1
10 TABLET, EXTENDED RELEASE ORAL 2 TIMES DAILY
Qty: 180 TABLET | Refills: 3 | Status: SHIPPED | OUTPATIENT
Start: 2020-04-06 | End: 2020-11-12

## 2020-04-06 NOTE — TELEPHONE ENCOUNTER
Left message for patient to return call. Please let her know CDL response below.  Isabelle Nelson MA

## 2020-04-22 NOTE — PROGRESS NOTES
"Mya Hui is a 80 year old female who is being evaluated via a billable telephone visit.      The patient has been notified of following:     \"This telephone visit will be conducted via a call between you and your physician/provider. We have found that certain health care needs can be provided without the need for a physical exam.  This service lets us provide the care you need with a short phone conversation.  If a prescription is necessary we can send it directly to your pharmacy.  If lab work is needed we can place an order for that and you can then stop by our lab to have the test done at a later time.    Telephone visits are billed at different rates depending on your insurance coverage. During this emergency period, for some insurers they may be billed the same as an in-person visit.  Please reach out to your insurance provider with any questions.    If during the course of the call the physician/provider feels a telephone visit is not appropriate, you will not be charged for this service.\"    Patient has given verbal consent for Telephone visit?  Yes    How would you like to obtain your AVS? Mail a copy    Subjective     Mya Hui is a 80 year old female who presents to clinic today for the following health issues:    HPI     Diabetes Follow-up    How often are you checking your blood sugar? A few times a month - needs a new meter. Runs between .  Have you had any blood sugars above 200?  No  Have you had any blood sugars below 70?  No    What symptoms do you notice when your blood sugar is low?  None    What concerns do you have today about your diabetes? None     Do you have any of these symptoms? (Select all that apply)  No numbness or tingling in feet.  No redness, sores or blisters on feet.  No complaints of excessive thirst.  No reports of blurry vision.  No significant changes to weight.    - Meter no longer working      Accu-check compact plus       Hyperlipidemia " "Follow-Up      Are you regularly taking any medication or supplement to lower your cholesterol?   Yes- Pravastatin    Are you having muscle aches or other side effects that you think could be caused by your cholesterol lowering medication?  No    Hypertension Follow-up      Do you check your blood pressure regularly outside of the clinic? Yes     Are you following a low salt diet? Yes    Are your blood pressures ever more than 140 on the top number (systolic) OR more   than 90 on the bottom number (diastolic), for example 140/90? Yes - \"in the 160/80's\" She says she gets a headache when her blood pressure is high.    - Myrbetriq causing BP high?       Doesn't really seem to be helping and very $$$      Oxybutynin alone worked about the same   - Wants to try OTC one   - Always has a head ache   - BP this /79   -       BP Readings from Last 2 Encounters:   03/10/20 (!) 169/77   01/22/20 (!) 161/79     Hemoglobin A1C (%)   Date Value   04/24/2020 5.9 (H)   11/20/2019 5.5     LDL Cholesterol Calculated (mg/dL)   Date Value   11/20/2019 71   03/08/2019 38       Chronic Kidney Disease Follow-up    Do you take any over the counter pain medicine?: Yes  What over the counter medicine are you taking for your pain?:  Tylenol      How often do you take this medicine?:  \"Two in the morning, two in the middle of the day, and two before bed.\"    Recent Labs   Lab Test 04/24/20  1310 11/20/19  1108 09/24/19  1645 03/08/19  0927  10/10/18  0752   A1C 5.9* 5.5 5.7* 5.7*  --  5.7*   LDL  --  71  --  38  --  60   HDL  --  63  --  46*  --  40*   TRIG  --  116  --  165*  --  213*   ALT 37 47  --  27   < > 25   CR 0.93 1.01  --  1.07*  --  1.11*   GFRESTIMATED 59* 53*  --  50*  --  48*   GFRESTBLACK 69 62  --  58*  --  58*   POTASSIUM 4.0 3.7  --  4.2  --  3.7   TSH  --   --  2.14 4.77*  --  3.84    < > = values in this interval not displayed.      BP Readings from Last 3 Encounters:   03/10/20 (!) 169/77   01/22/20 (!) 161/79 "   01/20/20 138/74    Wt Readings from Last 3 Encounters:   03/10/20 93 kg (205 lb)   01/20/20 89.4 kg (197 lb)   11/20/19 88.6 kg (195 lb 6.4 oz)                    Reviewed and updated as needed this visit by Provider         Review of Systems   ROS COMP: Constitutional, HEENT, cardiovascular, pulmonary, gi and gu systems are negative, except as otherwise noted.       Objective   Reported vitals:  There were no vitals taken for this visit.   healthy, alert and no distress  PSYCH: Alert and oriented times 3; coherent speech, normal   rate and volume, able to articulate logical thoughts, able   to abstract reason, no tangential thoughts, no hallucinations   or delusions  Her affect is normal  RESP: No cough, no audible wheezing, able to talk in full sentences  Remainder of exam unable to be completed due to telephone visits    Diagnostic Test Results:  Labs reviewed in Epic          Assessment/Plan:    ICD-10-CM    1. Type 2 diabetes mellitus without complication, without long-term current use of insulin (H)  E11.9 blood glucose (ACCU-CHEK COMPACT PLUS) test strip     blood glucose monitoring (SOFTCLIX) lancets     blood glucose monitoring (NO BRAND SPECIFIED) meter device kit   2. CKD (chronic kidney disease) stage 3, GFR 30-59 ml/min (H)  N18.3    3. Bilateral lower extremity edema  R60.0    4. Hypothyroidism, unspecified type  E03.9    5. Essential hypertension with goal blood pressure less than 140/90  I10    6. Hyperlipidemia with target LDL less than 100  E78.5    7. OAB (overactive bladder)  N32.81    8. Slow transit constipation  K59.01      1. Chronic issues  - Reviewed labs patient did last week   - Stable   - Reviewed medications   - Recheck diabetes in 6 months     2. OAB  - Patient is having issues with an expensive medication that she was put on, Myrbetriq and wants to stop it due to raising BP  - She notices no difference of this medication when added to her Oxybutynin   -  Stop the Myrbetriq and  monitor symptoms       Both urine and blood pressure   - Call in 1-2 weeks with blood pressures       Call sooner if blood pressures don't come down, discussed this would mean her medications need to be increased     3. Constipation     Still having issues      Recommend bowel clean out followed by fiber supplementation (see patient instructions)       The patient indicates understanding of these issues and agrees with the plan.    Return in about 6 months (around 10/27/2020) for Diabetes Recheck.    Phone call duration:  18 minutes    Carlos Stinson PA-C  [facility]

## 2020-04-24 DIAGNOSIS — N18.30 CKD (CHRONIC KIDNEY DISEASE) STAGE 3, GFR 30-59 ML/MIN (H): ICD-10-CM

## 2020-04-24 DIAGNOSIS — I10 ESSENTIAL HYPERTENSION WITH GOAL BLOOD PRESSURE LESS THAN 140/90: ICD-10-CM

## 2020-04-24 DIAGNOSIS — E11.9 TYPE 2 DIABETES MELLITUS WITHOUT COMPLICATION, WITHOUT LONG-TERM CURRENT USE OF INSULIN (H): ICD-10-CM

## 2020-04-24 DIAGNOSIS — E78.5 HYPERLIPIDEMIA WITH TARGET LDL LESS THAN 100: ICD-10-CM

## 2020-04-24 DIAGNOSIS — E87.6 HYPOKALEMIA: ICD-10-CM

## 2020-04-24 LAB
ALBUMIN SERPL-MCNC: 3.7 G/DL (ref 3.4–5)
ALP SERPL-CCNC: 58 U/L (ref 40–150)
ALT SERPL W P-5'-P-CCNC: 37 U/L (ref 0–50)
ANION GAP SERPL CALCULATED.3IONS-SCNC: 6 MMOL/L (ref 3–14)
AST SERPL W P-5'-P-CCNC: 26 U/L (ref 0–45)
BILIRUB SERPL-MCNC: 0.4 MG/DL (ref 0.2–1.3)
BUN SERPL-MCNC: 17 MG/DL (ref 7–30)
CALCIUM SERPL-MCNC: 8.6 MG/DL (ref 8.5–10.1)
CHLORIDE SERPL-SCNC: 111 MMOL/L (ref 94–109)
CO2 SERPL-SCNC: 26 MMOL/L (ref 20–32)
CREAT SERPL-MCNC: 0.93 MG/DL (ref 0.52–1.04)
GFR SERPL CREATININE-BSD FRML MDRD: 59 ML/MIN/{1.73_M2}
GLUCOSE SERPL-MCNC: 117 MG/DL (ref 70–99)
HBA1C MFR BLD: 5.9 % (ref 0–5.6)
POTASSIUM SERPL-SCNC: 4 MMOL/L (ref 3.4–5.3)
PROT SERPL-MCNC: 6.9 G/DL (ref 6.8–8.8)
SODIUM SERPL-SCNC: 143 MMOL/L (ref 133–144)

## 2020-04-24 PROCEDURE — 36415 COLL VENOUS BLD VENIPUNCTURE: CPT | Performed by: PHYSICIAN ASSISTANT

## 2020-04-24 PROCEDURE — 80053 COMPREHEN METABOLIC PANEL: CPT | Performed by: PHYSICIAN ASSISTANT

## 2020-04-24 PROCEDURE — 83036 HEMOGLOBIN GLYCOSYLATED A1C: CPT | Performed by: PHYSICIAN ASSISTANT

## 2020-04-27 ENCOUNTER — VIRTUAL VISIT (OUTPATIENT)
Dept: FAMILY MEDICINE | Facility: OTHER | Age: 80
End: 2020-04-27
Payer: COMMERCIAL

## 2020-04-27 DIAGNOSIS — E11.9 TYPE 2 DIABETES MELLITUS WITHOUT COMPLICATION, WITHOUT LONG-TERM CURRENT USE OF INSULIN (H): Primary | ICD-10-CM

## 2020-04-27 DIAGNOSIS — E03.9 HYPOTHYROIDISM, UNSPECIFIED TYPE: ICD-10-CM

## 2020-04-27 DIAGNOSIS — E78.5 HYPERLIPIDEMIA WITH TARGET LDL LESS THAN 100: ICD-10-CM

## 2020-04-27 DIAGNOSIS — K59.01 SLOW TRANSIT CONSTIPATION: ICD-10-CM

## 2020-04-27 DIAGNOSIS — R60.0 BILATERAL LOWER EXTREMITY EDEMA: ICD-10-CM

## 2020-04-27 DIAGNOSIS — N18.30 CKD (CHRONIC KIDNEY DISEASE) STAGE 3, GFR 30-59 ML/MIN (H): ICD-10-CM

## 2020-04-27 DIAGNOSIS — I10 ESSENTIAL HYPERTENSION WITH GOAL BLOOD PRESSURE LESS THAN 140/90: ICD-10-CM

## 2020-04-27 DIAGNOSIS — N32.81 OAB (OVERACTIVE BLADDER): ICD-10-CM

## 2020-04-27 PROCEDURE — 99214 OFFICE O/P EST MOD 30 MIN: CPT | Mod: TEL | Performed by: PHYSICIAN ASSISTANT

## 2020-04-27 RX ORDER — LANCETS
1 EACH MISCELLANEOUS DAILY
Qty: 100 EACH | Refills: 11 | Status: SHIPPED | OUTPATIENT
Start: 2020-04-27 | End: 2022-09-30

## 2020-04-27 NOTE — PATIENT INSTRUCTIONS
1. Stop the Myrbetriq and monitor symptoms       Both urine and blood pressure     2. Call in 1-2 weeks with blood pressures       Call sooner if blood pressures don't come down     3. Bowel clean out - then after this start fiber supplement     - Recommend fiber supplement   - Start with 1 of selected product (pills, gummies, tablets, etc.)      Recommend Psyllium Husk Fiber      Increase every 1-2 weeks as tolerated until soft daily bowel movement     BOWEL CLEAN OUT  Start a clear liquid diet after breakfast.  A clear liquid diet consists of soda, juices without pulp, broth, Jell-O, Popsicles, Italian ice, hard candies (if age appropriate).  Pretty much anything you can see through!!  (NO dairy products or solid food.)    You will need:  1. 32 or 64 oz. of flavored Pedialyte or Gatorade (See Below)  2. One 255 gram bottle of Miralax  3. 2 or 3 bisacodyl (Dulcolax) tablets     These are all available without prescription.      Around 12 Noon on the day of the clean out, mix the entire container of Miralax (255 gr) in 64 oz. (or half a container in 32 ounces) of Pedialyte or  Gatorade. Leave this Miralax mixture in the refrigerator for one hour to help the Miralax dissolve, and to help the mixture taste better.  Note, the dose we re suggesting is for a bowel  cleanout.   It is not the dose that is written on the bottle, which is designed for daily softening of stool.  We need this higher dose so that the cleanout will work.      Drink 8-12 oz. of the MiraLax-electrolyte solution mixture every 15-20 minutes until the entire 64 oz mixture is consumed.  It is very important to drink all 64 oz of the MiraLax-electrolyte solution.     Within 30 min of finishing the MiraLax-electrolyte solution mixture, take the 3 bisacodyl (Dulcolax) tablets with 8-12 oz. of clear liquid (these tabs can be crushed).  (Note that the package instructions may direct not to take more than two tablets at a time, but for this preparation  take three).

## 2020-04-29 ENCOUNTER — TELEPHONE (OUTPATIENT)
Dept: FAMILY MEDICINE | Facility: OTHER | Age: 80
End: 2020-04-29

## 2020-04-29 NOTE — TELEPHONE ENCOUNTER
"Spoke with pt and reiterated directions from virtual visit on 4/27/2020. She stated she was a little confused about it. She now understands she is to stop the Myrbetriq but continue the Oxybutynin and then call in 1-2 weeks with her BP readings. Also reminded her that she will be getting all this information in the mail. She is concerned about doing the bowel clean out with miralax as she takes cholestyramine to help from having diarrhea. She is wondering if these will \"fight against each other\". Does she need to stop taking this when doing the bowel clean out?        Cleopatra Dumont CMA ([facility])    "

## 2020-04-29 NOTE — TELEPHONE ENCOUNTER
Please call. Patient states she was told that CDL would contact her today for follow up from her virtual visit and she has not heard back.  She would not give any other information she stated it is too long of a message. [phone]

## 2020-04-29 NOTE — TELEPHONE ENCOUNTER
"Attempted to get more information from pt for provider to review however she refused to tell me \"as it's just too long\" and \"she already knows everything, I don't want to repeat myself\". Will route to provider to review.    Cleopatra Dumont, GYPSY ([facility])    "

## 2020-04-29 NOTE — TELEPHONE ENCOUNTER
Patient is in error. I told her her AVS paper with plan for bowel clean out would come in the mail in 1-2 days. She is to call with blood pressures in 1-2 weeks    Carlos Stinson PA-C  [facility]

## 2020-04-30 NOTE — TELEPHONE ENCOUNTER
Called and spoke with patient regarding message below.  Patient verbalized understanding.  Yee Barrios CMA ([facility])

## 2020-04-30 NOTE — TELEPHONE ENCOUNTER
She can hold the cholestyramine while doing the clean out.     Carlos Stinson PA-C  [facility]

## 2020-05-05 ENCOUNTER — DOCUMENTATION ONLY (OUTPATIENT)
Dept: OTHER | Facility: CLINIC | Age: 80
End: 2020-05-05

## 2020-05-13 ENCOUNTER — OFFICE VISIT (OUTPATIENT)
Dept: FAMILY MEDICINE | Facility: OTHER | Age: 80
End: 2020-05-13
Payer: COMMERCIAL

## 2020-05-13 VITALS
WEIGHT: 202 LBS | HEART RATE: 68 BPM | SYSTOLIC BLOOD PRESSURE: 134 MMHG | DIASTOLIC BLOOD PRESSURE: 72 MMHG | BODY MASS INDEX: 33.61 KG/M2 | RESPIRATION RATE: 16 BRPM | TEMPERATURE: 98.8 F

## 2020-05-13 DIAGNOSIS — N63.14 UNSPECIFIED LUMP IN THE RIGHT BREAST, LOWER INNER QUADRANT: ICD-10-CM

## 2020-05-13 DIAGNOSIS — N63.0 LUMP OR MASS IN BREAST: Primary | ICD-10-CM

## 2020-05-13 DIAGNOSIS — Z80.3 FAMILY HISTORY OF MALIGNANT NEOPLASM OF BREAST: ICD-10-CM

## 2020-05-13 DIAGNOSIS — N63.0 BREAST MASS: ICD-10-CM

## 2020-05-13 PROCEDURE — 99213 OFFICE O/P EST LOW 20 MIN: CPT | Performed by: FAMILY MEDICINE

## 2020-05-13 ASSESSMENT — PAIN SCALES - GENERAL: PAINLEVEL: NO PAIN (0)

## 2020-05-13 NOTE — PROGRESS NOTES
Subjective     Mya Hui is a 80 year old female who presents to clinic today for the following health issues:    HPI   Breast lump      Duration: 1 week    Description (location/character/radiation): right breast     Intensity:  0/10    Accompanying signs and symptoms: lump is not hard, not painful or tender, seems soft, seems long and narrow, can only find this when lying down, no redness or discoloration, no warmth    History (similar episodes/previous evaluation): YES    Precipitating or alleviating factors: None    Therapies tried and outcome: None     -------------------------------------    Patient Active Problem List   Diagnosis     Arthritis     Nevus     Asymptomatic carotid artery stenosis     Hyperlipidemia with target LDL less than 100     History of renal calculi     Sciatica of right side     Hip pain     Hearing aid worn     Osteoarthritis of hip     DDD (degenerative disc disease), lumbar     OAB (overactive bladder)     Essential hypertension with goal blood pressure less than 140/90     Hypothyroidism, unspecified type     Insomnia, unspecified     Type 2 diabetes mellitus without complication, without long-term current use of insulin (H)     Cataract, bilateral     Primary osteoarthritis involving multiple joints     Chronic right shoulder pain     Right shoulder pain, unspecified chronicity     Dry mouth     Rotator cuff arthropathy, right     Sliding hiatal hernia     Calculus of gallbladder without cholecystitis     Gastroesophageal reflux disease, esophagitis presence not specified     S/P laparoscopic cholecystectomy     Toe anomaly     Cherry angioma     Urinary incontinence, unspecified type     Bilateral lower extremity edema     Diabetes mellitus with peripheral vascular disease (H)     CKD (chronic kidney disease) stage 3, GFR 30-59 ml/min (H)     Past Surgical History:   Procedure Laterality Date     BUNIONECTOMY      Right foot     CATARACT IOL, RT/LT Bilateral 2017      COLONOSCOPY  7/12/2013    Procedure: COLONOSCOPY;  Colonoscopy;  Surgeon: Giovany Espinoza MD;  Location: [facility]     COLONOSCOPY N/A 1/31/2019    Procedure: Combined Colonoscopy, Single Or Multiple Biopsy/Polypectomy By Biopsy;  Surgeon: Efren Osorio MD;  Location: [facility]     COLONOSCOPY WITH CO2 INSUFFLATION N/A 1/31/2019    Procedure: COLONOSCOPY WITH CO2 INSUFFLATION;  Surgeon: Efren Osorio MD;  Location: [facility]     FRACTURE TX, ANKLE RT/LT      Left ankle     HC CYSTOURETHROSCOPY W/ URETEROSCOPY &/OR PYELOSCOPY; W/ LITHOTRIPSY       HC REVISE MEDIAN N/CARPAL TUNNEL SURG      Right wrist     INJECT EPIDURAL LUMBAR Right 12/27/2018    Procedure: INJECT EPIDURAL LUMBAR right foraminal narrowing severe at L4-L5 and moderate at L5-S1;  Surgeon: Gilbert Mcclure MD;  Location: [facility]     INJECT JOINT SACROILIAC  4/10/2014    Procedure: INJECT JOINT SACROILIAC;  Sacroiliac Joint Injection;  Surgeon: Gilbert Mcclure MD;  Location: [facility]     INJECT JOINT SACROILIAC Left 4/11/2019    Procedure: INJECT JOINT SACROILIAC- LEFT;  Surgeon: Gilbert Mcclure MD;  Location: [facility]     LAPAROSCOPIC CHOLECYSTECTOMY N/A 3/24/2018    Procedure: LAPAROSCOPIC CHOLECYSTECTOMY;  Laparoscopic Cholecystectomy;  Surgeon: Berry Allen DO;  Location: [facility]       Social History     Tobacco Use     Smoking status: Never Smoker     Smokeless tobacco: Never Used   Substance Use Topics     Alcohol use: Yes     Family History   Problem Relation Age of Onset     Hypertension Brother      Cerebrovascular Disease Brother      Diabetes Father      Cardiovascular Father      Diabetes Brother         Borderline     Breast Cancer Mother      Diabetes Brother      Blood Disease Brother      Cardiovascular Brother         MI         Current Outpatient Medications   Medication Sig Dispense Refill     blood glucose (ACCU-CHEK COMPACT PLUS) test strip 1 strip by In Vitro route daily Use to test blood sugar 1 times daily  or as directed. 100 each 4     blood glucose monitoring (NO BRAND SPECIFIED) meter device kit Use to test blood sugar 1 times daily or as directed. Blood Glucose Monitor Brands: ACCU-CHEK COMPACT PLUS 1 kit 0     blood glucose monitoring (SOFTCLIX) lancets 1 each by In Vitro route daily Use to test blood sugar 1 times daily or as directed. 100 each 11     diltiazem ER COATED BEADS (CARTIA XT) 120 MG 24 hr capsule Take 1 capsule (120 mg) by mouth 2 times daily 180 capsule 3     Incontinence Supply Disposable (PROTECTIVE UNDERWEAR SUPER LG) MISC 2 each daily 64 each 11     levothyroxine (SYNTHROID/LEVOTHROID) 75 MCG tablet Take 1 tablet (75 mcg) by mouth daily 90 tablet 3     lidocaine (LIDODERM) 5 % patch Place 1 patch onto the skin every 24 hours To prevent lidocaine toxicity, patient should be patch free for 12 hrs daily. 30 patch 1     lisinopril (PRINIVIL/ZESTRIL) 40 MG tablet Take 1 tablet (40 mg) by mouth daily 90 tablet 3     Multiple Vitamins-Minerals (MULTIVITAL PO) Take  by mouth. daily       order for DME Equipment being ordered: [employer] shoes and insoles 2 Units 1     oxybutynin ER (DITROPAN-XL) 10 MG 24 hr tablet Take 1 tablet (10 mg) by mouth daily 90 tablet 3     pantoprazole (PROTONIX) 40 MG EC tablet Take 1 tablet (40 mg) by mouth 2 times daily 180 tablet 3     penciclovir (DENAVIR) 1 % external cream Apply topically every 2 hours as needed for cold sores 1.5 g 3     potassium chloride ER (K-TAB/KLOR-CON) 10 MEQ CR tablet Take 1 tablet (10 mEq) by mouth 2 times daily 180 tablet 3     pravastatin (PRAVACHOL) 40 MG tablet Take 1 tablet (40 mg) by mouth daily 90 tablet 3     tiZANidine (ZANAFLEX) 4 MG tablet Take 1 tablet (4 mg) by mouth 3 times daily as needed for muscle spasms 30 tablet 0     Allergies   Allergen Reactions     Codeine Nausea     Fentanyl Nausea and Vomiting     VERY sensitive to most narcotics if not all.     Recent Labs   Lab Test 04/24/20  1310 11/20/19  1108 09/24/19  1645  03/08/19  0927  10/10/18  0752   A1C 5.9* 5.5 5.7* 5.7*  --  5.7*   LDL  --  71  --  38  --  60   HDL  --  63  --  46*  --  40*   TRIG  --  116  --  165*  --  213*   ALT 37 47  --  27   < > 25   CR 0.93 1.01  --  1.07*  --  1.11*   GFRESTIMATED 59* 53*  --  50*  --  48*   GFRESTBLACK 69 62  --  58*  --  58*   POTASSIUM 4.0 3.7  --  4.2  --  3.7   TSH  --   --  2.14 4.77*  --  3.84    < > = values in this interval not displayed.      BP Readings from Last 3 Encounters:   05/13/20 134/72   03/10/20 (!) 169/77   01/22/20 (!) 161/79    Wt Readings from Last 3 Encounters:   05/13/20 91.6 kg (202 lb)   03/10/20 93 kg (205 lb)   01/20/20 89.4 kg (197 lb)                    Reviewed and updated as needed this visit by Provider         Review of Systems   Constitutional, HEENT, cardiovascular, pulmonary, GI, , musculoskeletal, neuro, skin, endocrine and psych systems are negative, except as otherwise noted.      Objective    /72   Pulse 68   Temp 98.8  F (37.1  C) (Temporal)   Resp 16   Wt 91.6 kg (202 lb)   LMP  (LMP Unknown)   BMI 33.61 kg/m    Body mass index is 33.61 kg/m .  Physical Exam  Constitutional:       Appearance: Normal appearance.   HENT:      Head: Normocephalic and atraumatic.   Neck:      Musculoskeletal: Normal range of motion.   Cardiovascular:      Rate and Rhythm: Normal rate and regular rhythm.      Pulses: Normal pulses.      Heart sounds: Normal heart sounds.   Pulmonary:      Effort: Pulmonary effort is normal.      Breath sounds: Normal breath sounds.      Comments: Breast lump along 3'0 clock position on the outer aspect of the right breast  Neurological:      Mental Status: She is alert.   Psychiatric:         Mood and Affect: Mood normal.         Behavior: Behavior normal.         Thought Content: Thought content normal.         Judgment: Judgment normal.            Diagnostic Test Results:  Labs reviewed in Epic        Assessment & Plan   Problem List Items Addressed This Visit   "   None      Visit Diagnoses     Lump or mass in breast    -  Primary    Family history of malignant neoplasm of breast        Breast mass        Relevant Orders    MA Diagnostic Digital Bilateral    Unspecified lump in the right breast, lower inner quadrant         Relevant Orders    MA Diagnostic Digital Bilateral         Gt diagnositc mammogram for further eval  Follow results and treat accordingly      BMI:   Estimated body mass index is 33.61 kg/m  as calculated from the following:    Height as of 12/23/19: 1.651 m (5' 5\").    Weight as of this encounter: 91.6 kg (202 lb).           See Patient Instructions  No follow-ups on file.    Katt Krause MD  [facility]    "

## 2020-05-19 ENCOUNTER — HOSPITAL ENCOUNTER (OUTPATIENT)
Dept: MAMMOGRAPHY | Facility: CLINIC | Age: 80
End: 2020-05-19
Attending: FAMILY MEDICINE
Payer: COMMERCIAL

## 2020-05-19 ENCOUNTER — HOSPITAL ENCOUNTER (OUTPATIENT)
Dept: ULTRASOUND IMAGING | Facility: CLINIC | Age: 80
End: 2020-05-19
Attending: FAMILY MEDICINE
Payer: COMMERCIAL

## 2020-05-19 DIAGNOSIS — N63.0 BREAST MASS: ICD-10-CM

## 2020-05-19 DIAGNOSIS — N63.14 UNSPECIFIED LUMP IN THE RIGHT BREAST, LOWER INNER QUADRANT: ICD-10-CM

## 2020-05-19 PROCEDURE — G0279 TOMOSYNTHESIS, MAMMO: HCPCS

## 2020-05-19 PROCEDURE — 76642 ULTRASOUND BREAST LIMITED: CPT | Mod: RT

## 2020-05-19 NOTE — PROGRESS NOTES
Appropriate assistive devices provided during their visit. yes(Yes, No, N/A) walker (list device)    Exam table and/or cart  placed in the lowest position. na (Yes, No, N/A)    Brakes on tables/carts/wheelchairs used at all times. na (Yes, No, N/A)    Non slip footwear applied. na (Yes, No, NA)    Patient was accompanied by staff throughout visit. yes (Yes, No, N/A)    Equipment safety straps used. na (Yes, No, N/A)    Assist with toileting. na (Yes, No, N/A)

## 2020-05-21 ENCOUNTER — TELEPHONE (OUTPATIENT)
Dept: FAMILY MEDICINE | Facility: OTHER | Age: 80
End: 2020-05-21

## 2020-05-21 NOTE — TELEPHONE ENCOUNTER
Katt Krause MD P Erc Float Pool               Reviewed. Results discussed by radiology with the patient already . Please set reminder to call patient about follow up ultrasound in 6 month    Result Notes for MA Diagnostic Right w/Gerald     Notes recorded by Katt Krause MD on 5/21/2020 at 8:27 AM CDT   Reviewed. Results discussed by radiology with the patient already . Please set reminder to call patient about follow up ultrasound in 6 month

## 2020-06-01 NOTE — TELEPHONE ENCOUNTER
"Requested Prescriptions   Pending Prescriptions Disp Refills     CARTIA  MG 24 hr capsule [Pharmacy Med Name: CARTIA XT 120MG CP24] 90 capsule 2     Sig: TAKE ONE CAPSULE BY MOUTH ONCE DAILY    Calcium Channel Blockers Protocol  Failed    3/8/2018 12:00 PM       Failed - Normal serum creatinine on file in past 12 months    Recent Labs   Lab Test  09/18/17   1458   CR  1.16*            Passed - Blood pressure under 140/90 in past 12 months    BP Readings from Last 3 Encounters:   01/04/18 130/84   09/22/17 109/65   09/18/17 126/66                Passed - Normal ALT in past 12 months    Recent Labs   Lab Test  09/10/17   0030   ALT  29            Passed - Recent (12 mo) or future (30 days) visit within the authorizing provider's specialty    Patient had office visit in the last year or has a visit in the next 30 days with authorizing provider.  See \"Patient Info\" tab in inbasket, or \"Choose Columns\" in Meds & Orders section of the refill encounter.            Passed - Patient is age 18 or older       Passed - No active pregnancy on record       Passed - No positive pregnancy test in past 12 months          Last Written Prescription Date:  6/16/17  Last Fill Quantity: 90,  # refills: 2   Last Office Visit with G, P or [facility] prescribing provider:  1/4/18   Future Office Visit:       " Hide Additional Notes?: No Detail Level: Generalized

## 2020-07-08 ENCOUNTER — TELEPHONE (OUTPATIENT)
Dept: ORTHOPEDICS | Facility: OTHER | Age: 80
End: 2020-07-08

## 2020-07-08 DIAGNOSIS — M25.511 CHRONIC RIGHT SHOULDER PAIN: Primary | ICD-10-CM

## 2020-07-08 DIAGNOSIS — G89.29 CHRONIC RIGHT SHOULDER PAIN: Primary | ICD-10-CM

## 2020-07-08 NOTE — TELEPHONE ENCOUNTER
Reason for Call:  Other call back    Detailed comments: Please call pt as she wants to know if she can use a lidocaine patch for her shoulder    Phone Number Patient can be reached at: Home number on file [phone] (home)    Best Time: Na    Can we leave a detailed message on this number? YES    Call taken on 7/8/2020 at 1:06 PM by Soheila George

## 2020-07-08 NOTE — TELEPHONE ENCOUNTER
"Spoke with patient. She did not get any relief from the last steroid injection. Last OVN indicates that IA shoulder injection was discussed.     She is having difficulty using the arm due to pain and \"can't hardly move it\". She is interested in proceeding with injection and would like to complete in [place].    Informed okay to try lidocaine patches in the interim.     Will route to Dr. Cohen for signature on XR guided injection.     Manuela Chung M.Ed., LAT, ATC  Clinic Coordinator for Dr. Princess Cohen    "

## 2020-07-09 DIAGNOSIS — Z11.59 ENCOUNTER FOR SCREENING FOR OTHER VIRAL DISEASES: Primary | ICD-10-CM

## 2020-07-09 NOTE — TELEPHONE ENCOUNTER
Called and spoke with patient.  She would like to try the injection.  Number given for scheduling.    Viola Salamanca MS ATC

## 2020-07-15 DIAGNOSIS — Z11.59 ENCOUNTER FOR SCREENING FOR OTHER VIRAL DISEASES: ICD-10-CM

## 2020-07-15 PROCEDURE — U0003 INFECTIOUS AGENT DETECTION BY NUCLEIC ACID (DNA OR RNA); SEVERE ACUTE RESPIRATORY SYNDROME CORONAVIRUS 2 (SARS-COV-2) (CORONAVIRUS DISEASE [COVID-19]), AMPLIFIED PROBE TECHNIQUE, MAKING USE OF HIGH THROUGHPUT TECHNOLOGIES AS DESCRIBED BY CMS-2020-01-R: HCPCS | Performed by: PHYSICAL MEDICINE & REHABILITATION

## 2020-07-16 LAB
SARS-COV-2 RNA SPEC QL NAA+PROBE: NOT DETECTED
SPECIMEN SOURCE: NORMAL

## 2020-07-17 ENCOUNTER — HOSPITAL ENCOUNTER (OUTPATIENT)
Dept: GENERAL RADIOLOGY | Facility: CLINIC | Age: 80
Discharge: HOME | End: 2020-07-17
Attending: PHYSICAL MEDICINE & REHABILITATION | Admitting: PHYSICAL MEDICINE & REHABILITATION
Payer: COMMERCIAL

## 2020-07-17 DIAGNOSIS — M25.511 CHRONIC RIGHT SHOULDER PAIN: ICD-10-CM

## 2020-07-17 DIAGNOSIS — G89.29 CHRONIC RIGHT SHOULDER PAIN: ICD-10-CM

## 2020-07-17 PROCEDURE — 25000125 ZZHC RX 250: Performed by: RADIOLOGY

## 2020-07-17 PROCEDURE — 25000128 H RX IP 250 OP 636: Performed by: RADIOLOGY

## 2020-07-17 PROCEDURE — 77002 NEEDLE LOCALIZATION BY XRAY: CPT

## 2020-07-17 PROCEDURE — 25500064 ZZH RX 255 OP 636: Performed by: RADIOLOGY

## 2020-07-17 RX ORDER — IOPAMIDOL 408 MG/ML
50 INJECTION, SOLUTION INTRAVASCULAR ONCE
Status: COMPLETED | OUTPATIENT
Start: 2020-07-17 | End: 2020-07-17

## 2020-07-17 RX ORDER — TRIAMCINOLONE ACETONIDE 40 MG/ML
40 INJECTION, SUSPENSION INTRA-ARTICULAR; INTRAMUSCULAR ONCE
Status: COMPLETED | OUTPATIENT
Start: 2020-07-17 | End: 2020-07-17

## 2020-07-17 RX ORDER — BUPIVACAINE HYDROCHLORIDE 2.5 MG/ML
10 INJECTION, SOLUTION EPIDURAL; INFILTRATION; INTRACAUDAL ONCE
Status: COMPLETED | OUTPATIENT
Start: 2020-07-17 | End: 2020-07-17

## 2020-07-17 RX ORDER — LIDOCAINE HYDROCHLORIDE 10 MG/ML
10 INJECTION, SOLUTION INFILTRATION; PERINEURAL ONCE
Status: COMPLETED | OUTPATIENT
Start: 2020-07-17 | End: 2020-07-17

## 2020-07-17 RX ADMIN — TRIAMCINOLONE ACETONIDE 40 MG: 40 INJECTION, SUSPENSION INTRA-ARTICULAR; INTRAMUSCULAR at 13:36

## 2020-07-17 RX ADMIN — LIDOCAINE HYDROCHLORIDE 0.5 ML: 10 INJECTION, SOLUTION INFILTRATION; PERINEURAL at 13:31

## 2020-07-17 RX ADMIN — IOPAMIDOL 1 ML: 408 INJECTION, SOLUTION INTRAVASCULAR at 13:36

## 2020-07-17 RX ADMIN — BUPIVACAINE HYDROCHLORIDE 4 ML: 2.5 INJECTION, SOLUTION EPIDURAL; INFILTRATION; INTRACAUDAL at 13:36

## 2020-08-07 ENCOUNTER — TELEPHONE (OUTPATIENT)
Dept: UROLOGY | Facility: CLINIC | Age: 80
End: 2020-08-07

## 2020-08-07 DIAGNOSIS — N32.81 OAB (OVERACTIVE BLADDER): ICD-10-CM

## 2020-08-07 RX ORDER — OXYBUTYNIN CHLORIDE 10 MG/1
10 TABLET, EXTENDED RELEASE ORAL DAILY
Qty: 90 TABLET | Refills: 0 | Status: SHIPPED | OUTPATIENT
Start: 2020-08-07 | End: 2020-11-04

## 2020-08-07 NOTE — TELEPHONE ENCOUNTER
Reason for call:  Medication   If this is a refill request, has the caller requested the refill from the pharmacy already? Yes  Will the patient be using a [facility]? No  Name of the pharmacy and phone number for the current request:   [id number] - [place], [place] - [address] [place] [address] [email or web address] [phone] (Phone)  [phone] (Fax)         Name of the medication requested: oxybutynin ER (DITROPAN-XL) 10 MG 24 hr tablet    Other request: Please call once ready    Phone number to reach patient:  Home number on file [phone] (home)    Best Time:  any    Can we leave a detailed message on this number?  YES    Travel screening: Negative

## 2020-08-07 NOTE — TELEPHONE ENCOUNTER
Signed Prescriptions:                        Disp   Refills    oxybutynin ER (DITROPAN-XL) 10 MG 24 hr ta*90 tab*0        Sig: Take 1 tablet (10 mg) by mouth daily  Authorizing Provider: ANJELICA DERAS  Ordering User: ARYAN CANAS RN

## 2020-09-18 NOTE — PROGRESS NOTES
"Mya Hui is a 80 year old female who is being evaluated via a billable telephone visit.      The patient has been notified of following:     \"This telephone visit will be conducted via a call between you and your physician/provider. We have found that certain health care needs can be provided without the need for a physical exam.  This service lets us provide the care you need with a short phone conversation.  If a prescription is necessary we can send it directly to your pharmacy.  If lab work is needed we can place an order for that and you can then stop by our lab to have the test done at a later time.    Telephone visits are billed at different rates depending on your insurance coverage. During this emergency period, for some insurers they may be billed the same as an in-person visit.  Please reach out to your insurance provider with any questions.    If during the course of the call the physician/provider feels a telephone visit is not appropriate, you will not be charged for this service.\"    Patient has given verbal consent for Telephone visit?  Yes    What phone number would you like to be contacted at? [phone]    How would you like to obtain your AVS? Mail a copy    Subjective     Mya Hui is a 80 year old female who presents via phone visit today for the following health issues:    HPI    Concern - fatigue   Onset: about 4-5 months   Description: just feels tired the whole day   Intensity: moderate  Progression of Symptoms:  worsening  Accompanying Signs & Symptoms: can fall asleep no matter what time of day, feels tired and can't do things she wants to do as she is just too tired   Previous history of similar problem: none   Precipitating factors:        Worsened by: weather  Alleviating factors:        Improved by: nothing   Therapies tried and outcome:  none     - Walks every day   - When started to get cold couldn't walk as much   - Pees at night but feels sleeping well   - Groggy " in the AM   - No medication changes   - Normal bladder and bowel movements   - Blood sugars - been a little bit high   - Takes potassium with food   - Thyroid - takes regularly           Review of Systems   Constitutional, HEENT, cardiovascular, pulmonary, gi and gu systems are negative, except as otherwise noted.       Objective      Vitals:  No vitals were obtained today due to virtual visit.  healthy, alert and no distress  PSYCH: Alert and oriented times 3; coherent speech, normal   rate and volume, able to articulate logical thoughts, able   to abstract reason, no tangential thoughts, no hallucinations   or delusions  Her affect is normal  RESP: No cough, no audible wheezing, able to talk in full sentences  Remainder of exam unable to be completed due to telephone visits      Diagnostics  None           Assessment & Plan     ICD-10-CM    1. Other fatigue  R53.83 TSH WITH FREE T4 REFLEX     Comprehensive metabolic panel (BMP + Alb, Alk Phos, ALT, AST, Total. Bili, TP)     CBC with platelets     Hemoglobin A1c   2. Type 2 diabetes mellitus without complication, without long-term current use of insulin (H)  E11.9 Hemoglobin A1c   3. Hypothyroidism, unspecified type  E03.9 TSH WITH FREE T4 REFLEX     - Discussed possible causes for her fatigue including thyroid off (has been awhile since checked), high blood sugars due to her diabetes, anemia, electrolyte imbalance, depression/anxiety, or other   - Recommend labs to check for reversible causes, this will be scheduled   - Results will be communicated to patient when available and appropriate medication changes made if necessary   - Patient states doesn't worry about our world but pandemic is hard      Discussed making sure she still connects with family members      Discussed ways to stay active as she recently hasn't been walking as much   - If persists and labs normal, will see her in clinic to assess lung and cardiac function     The patient indicates  understanding of these issues and agrees with the plan.    Follow up: Pending labs     Kisha Stinson PA-C  [facility]    Phone call duration:  9 minutes and 30 seconds

## 2020-09-22 ENCOUNTER — VIRTUAL VISIT (OUTPATIENT)
Dept: FAMILY MEDICINE | Facility: OTHER | Age: 80
End: 2020-09-22
Payer: COMMERCIAL

## 2020-09-22 DIAGNOSIS — E11.9 TYPE 2 DIABETES MELLITUS WITHOUT COMPLICATION, WITHOUT LONG-TERM CURRENT USE OF INSULIN (H): ICD-10-CM

## 2020-09-22 DIAGNOSIS — E03.9 HYPOTHYROIDISM, UNSPECIFIED TYPE: ICD-10-CM

## 2020-09-22 DIAGNOSIS — R53.83 OTHER FATIGUE: Primary | ICD-10-CM

## 2020-09-22 PROCEDURE — 99214 OFFICE O/P EST MOD 30 MIN: CPT | Mod: 95 | Performed by: PHYSICIAN ASSISTANT

## 2020-09-23 ENCOUNTER — ALLIED HEALTH/NURSE VISIT (OUTPATIENT)
Dept: FAMILY MEDICINE | Facility: OTHER | Age: 80
End: 2020-09-23
Payer: COMMERCIAL

## 2020-09-23 DIAGNOSIS — Z23 NEED FOR PROPHYLACTIC VACCINATION AND INOCULATION AGAINST INFLUENZA: Primary | ICD-10-CM

## 2020-09-23 DIAGNOSIS — E11.9 TYPE 2 DIABETES MELLITUS WITHOUT COMPLICATION, WITHOUT LONG-TERM CURRENT USE OF INSULIN (H): ICD-10-CM

## 2020-09-23 DIAGNOSIS — E03.9 HYPOTHYROIDISM, UNSPECIFIED TYPE: ICD-10-CM

## 2020-09-23 DIAGNOSIS — R53.83 OTHER FATIGUE: ICD-10-CM

## 2020-09-23 LAB
ALBUMIN SERPL-MCNC: 3.7 G/DL (ref 3.4–5)
ALP SERPL-CCNC: 57 U/L (ref 40–150)
ALT SERPL W P-5'-P-CCNC: 32 U/L (ref 0–50)
ANION GAP SERPL CALCULATED.3IONS-SCNC: 1 MMOL/L (ref 3–14)
AST SERPL W P-5'-P-CCNC: 24 U/L (ref 0–45)
BILIRUB SERPL-MCNC: 0.4 MG/DL (ref 0.2–1.3)
BUN SERPL-MCNC: 25 MG/DL (ref 7–30)
CALCIUM SERPL-MCNC: 8.9 MG/DL (ref 8.5–10.1)
CHLORIDE SERPL-SCNC: 113 MMOL/L (ref 94–109)
CO2 SERPL-SCNC: 29 MMOL/L (ref 20–32)
CREAT SERPL-MCNC: 0.98 MG/DL (ref 0.52–1.04)
ERYTHROCYTE [DISTWIDTH] IN BLOOD BY AUTOMATED COUNT: 12.7 % (ref 10–15)
GFR SERPL CREATININE-BSD FRML MDRD: 55 ML/MIN/{1.73_M2}
GLUCOSE SERPL-MCNC: 86 MG/DL (ref 70–99)
HBA1C MFR BLD: 6.1 % (ref 0–5.6)
HCT VFR BLD AUTO: 40.2 % (ref 35–47)
HGB BLD-MCNC: 13.1 G/DL (ref 11.7–15.7)
MCH RBC QN AUTO: 30.3 PG (ref 26.5–33)
MCHC RBC AUTO-ENTMCNC: 32.6 G/DL (ref 31.5–36.5)
MCV RBC AUTO: 93 FL (ref 78–100)
PLATELET # BLD AUTO: 173 10E9/L (ref 150–450)
POTASSIUM SERPL-SCNC: 4.1 MMOL/L (ref 3.4–5.3)
PROT SERPL-MCNC: 6.8 G/DL (ref 6.8–8.8)
RBC # BLD AUTO: 4.33 10E12/L (ref 3.8–5.2)
SODIUM SERPL-SCNC: 143 MMOL/L (ref 133–144)
TSH SERPL DL<=0.005 MIU/L-ACNC: 2.14 MU/L (ref 0.4–4)
WBC # BLD AUTO: 6.5 10E9/L (ref 4–11)

## 2020-09-23 PROCEDURE — 85027 COMPLETE CBC AUTOMATED: CPT | Performed by: PHYSICIAN ASSISTANT

## 2020-09-23 PROCEDURE — 90662 IIV NO PRSV INCREASED AG IM: CPT

## 2020-09-23 PROCEDURE — 83036 HEMOGLOBIN GLYCOSYLATED A1C: CPT | Performed by: PHYSICIAN ASSISTANT

## 2020-09-23 PROCEDURE — 84443 ASSAY THYROID STIM HORMONE: CPT | Performed by: PHYSICIAN ASSISTANT

## 2020-09-23 PROCEDURE — 99207 ZZC NO CHARGE NURSE ONLY: CPT

## 2020-09-23 PROCEDURE — G0008 ADMIN INFLUENZA VIRUS VAC: HCPCS

## 2020-09-23 PROCEDURE — 36415 COLL VENOUS BLD VENIPUNCTURE: CPT | Performed by: PHYSICIAN ASSISTANT

## 2020-09-23 PROCEDURE — 80053 COMPREHEN METABOLIC PANEL: CPT | Performed by: PHYSICIAN ASSISTANT

## 2020-09-23 NOTE — PROGRESS NOTES
Patient consents to receive outdoor care: Yes    Upon arrival, patient instructed to proceed to designated location, place vehicle in park, turn off, and remove keys  and Patient receiving an immunization or injection. Instructed patient to notify healthcare personnel if they are having an adverse reaction.    If we are unable to safely and ergonomically able to provide care- is the patient able to safely able to get out of car and transfer to a chair? Yes    Patient would like to receive their AVS in person after care is given.    Zahida Martin MA

## 2020-09-24 NOTE — RESULT ENCOUNTER NOTE
Please call patient with the following message    Labs all normal. A1C at 6.1 so really good. No cause found for her fatigue.     Carlos Stinson PA-C

## 2020-11-04 DIAGNOSIS — N32.81 OAB (OVERACTIVE BLADDER): ICD-10-CM

## 2020-11-04 RX ORDER — OXYBUTYNIN CHLORIDE 10 MG/1
10 TABLET, EXTENDED RELEASE ORAL DAILY
Qty: 90 TABLET | Refills: 0 | Status: SHIPPED | OUTPATIENT
Start: 2020-11-04 | End: 2021-03-01

## 2020-11-05 NOTE — MR AVS SNAPSHOT
"              After Visit Summary   2018    Mya Hui    MRN: [id number]           Patient Information     Date Of Birth          1940        Visit Information        Provider Department      2018 10:50 AM Annamaria Carpio MD [facility]        Today's Diagnoses     Shoulder pain, right    -  1       Follow-ups after your visit        Who to contact     If you have questions or need follow up information about today's clinic visit or your schedule please contact [facility] directly at [phone].  Normal or non-critical lab and imaging results will be communicated to you by [place], letter or phone within 4 business days after the clinic has received the results. If you do not hear from us within 7 days, please contact the clinic through [facility] or phone. If you have a critical or abnormal lab result, we will notify you by phone as soon as possible.  Submit refill requests through [employer] or call your pharmacy and they will forward the refill request to us. Please allow 3 business days for your refill to be completed.          Additional Information About Your Visit        MyChart Information     [employer] lets you send messages to your doctor, view your test results, renew your prescriptions, schedule appointments and more. To sign up, go to [email or web address] . Click on \"Log in\" on the left side of the screen, which will take you to the Welcome page. Then click on \"Sign up Now\" on the right side of the page.     You will be asked to enter the access code listed below, as well as some personal information. Please follow the directions to create your username and password.     Your access code is: [email or web address]  Expires: 3/11/2018  4:22 PM     Your access code will  in 90 days. If you need help or a new code, please call your [facility] or [phone].        Care EveryWhere ID     This is your Care EveryWhere ID. This could be used by other " "organizations to access your [facility] medical records  [id number]        Your Vitals Were     Temperature Height BMI (Body Mass Index)             97.8  F (36.6  C) 1.651 m (5' 5\") 32.95 kg/m2          Blood Pressure from Last 3 Encounters:   01/04/18 130/84   09/22/17 109/65   09/18/17 126/66    Weight from Last 3 Encounters:   01/05/18 89.8 kg (198 lb)   01/04/18 89.8 kg (198 lb)   12/11/17 90.7 kg (200 lb)               Primary Care Provider Office Phone # Fax #    Chanell Garcia Omar, APRN [facility] [phone] [address] [place] [address]        Equal Access to Services     JANETTE RIBEIRO : Janelle hansen Somahi, waaxda luqadaha, qaybta kaalmada adeegyada, ct johnson . So [facility] [phone].    ATENCIÓN: Si habla español, tiene a pedro disposición servicios gratuitos de asistencia lingüística. Llame al [phone].    We comply with applicable federal civil rights laws and [place] laws. We do not discriminate on the basis of race, color, national origin, age, disability, sex, sexual orientation, or gender identity.            Thank you!     Thank you for choosing [facility]  for your care. Our goal is always to provide you with excellent care. Hearing back from our patients is one way we can continue to improve our services. Please take a few minutes to complete the written survey that you may receive in the mail after your visit with us. Thank you!             Your Updated Medication List - Protect others around you: Learn how to safely use, store and throw away your medicines at www.disposemymeds.org.          This list is accurate as of: 1/5/18 11:26 AM.  Always use your most recent med list.                   Brand Name Dispense Instructions for use Diagnosis    acetaminophen 325 MG tablet    TYLENOL    100 tablet    Take 2 tablets (650 mg) by mouth every 4 hours as needed for mild pain        amitriptyline 25 MG tablet    ELAVIL    90 " tablet    TAKE ONE TABLET BY MOUTH AT BEDTIME    Insomnia       blood glucose lancets standard    no brand specified    1 Box    Glucose test strips Use to test blood sugar 1 times daily or as directed.    Type 2 diabetes mellitus without complication, without long-term current use of insulin (H)       blood glucose monitoring test strip    no brand specified    100 strip    Use to test blood sugars 1 times daily or as directed    Type 2 diabetes mellitus without complication, without long-term current use of insulin (H)       cephALEXin 500 MG capsule    KEFLEX    14 capsule    Take 1 capsule (500 mg) by mouth 2 times daily for 7 days    Blister of toe of left foot with infection, initial encounter       diltiazem 120 MG 24 hr capsule    CARDIZEM CD/CARTIA XT    90 capsule    Take 1 capsule (120 mg) by mouth daily        levothyroxine 50 MCG tablet    SYNTHROID/LEVOTHROID    90 tablet    Take 1 tablet (50 mcg) by mouth daily    Hypothyroidism, unspecified type       lisinopril-hydrochlorothiazide 20-12.5 MG per tablet    PRINZIDE/ZESTORETIC    180 tablet    Take 1 tablet by mouth 2 times daily Do not restart until 9/12/17    Type 2 diabetes mellitus without complication, without long-term current use of insulin (H)       metFORMIN 500 MG 24 hr tablet    GLUCOPHAGE-XR    270 tablet    ONE TABLET BY MOUTH EVERY MORNING AND TAKE TWO TABLETS BY MOUTH EVERY EVENING WITH MEALS    Type 2 diabetes mellitus without complication, without long-term current use of insulin (H)       MULTIVITAL PO      Take  by mouth. daily        omeprazole 20 MG CR capsule    priLOSEC    30 capsule    Take 1 capsule (20 mg) by mouth every evening    Gastroesophageal reflux disease, esophagitis presence not specified       * oxybutynin 5 MG 24 hr tablet    DITROPAN-XL     Take by mouth daily        * oxybutynin 5 MG tablet    DITROPAN    240 tablet    TAKE TWO TABLETS (10MG) BY MOUTH TWICE A DAY AS NEEDED FOR BLADDER SPASMS    OAB (overactive  bladder)       pantoprazole 40 MG EC tablet    PROTONIX    180 tablet    Take 1 tablet (40 mg) by mouth 2 times daily Take 30-60 minutes before a meal.    Gastroesophageal reflux disease, esophagitis presence not specified       potassium chloride 10 MEQ tablet    K-TAB,KLOR-CON    90 tablet    Take 1 tablet (10 mEq) by mouth 3 times daily    Hypokalemia       pravastatin 40 MG tablet    PRAVACHOL    90 tablet    TAKE ONE TABLET BY MOUTH ONCE DAILY    Hyperlipidemia with target LDL less than 100       * Notice:  This list has 2 medication(s) that are the same as other medications prescribed for you. Read the directions carefully, and ask your doctor or other care provider to review them with you.       downtrending HB and BRBPR  PPI IV bid, SBP ppx with Ceftriaxone IV, octeotride gtt  dc'ed as per hepatology recs  colonoscopy on hold for now,  CBC q12hr, s/p 1upRBC

## 2020-11-09 ENCOUNTER — NURSE TRIAGE (OUTPATIENT)
Dept: FAMILY MEDICINE | Facility: OTHER | Age: 80
End: 2020-11-09

## 2020-11-09 NOTE — TELEPHONE ENCOUNTER
Patient called in today stating that both her ankles are really swollen. This is new for her. Onset about a week, The swelling is not going down even though she said that he been elevating them. 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

## 2020-11-09 NOTE — TELEPHONE ENCOUNTER
Patient returned call to discuss further.     1. Exposure to COVID- discuss how to get a test if needed. Patient doesn't have symptoms. Patient didn't believe she was near the patient for longer than 15 min. Will monitor.     2. Patient stated that she has been experiencing some mild edema in both of her legs for the last week. The patient describes the swelling depth less than 1/4 of an in. Patient denies redness, pain, fever, warmth, numbness/tingling, chest pain, or difficulty breathing.   Patient notices it more at night.     PLAN:   Per protocol patient should be seen within 3 days. Appointment was scheduled with Carlos on 11/10/20.   Homecare was provided.     Jose Jacques RN  November 9, 2020    Additional Information    Negative: Chest pain    Negative: Small area of swelling and followed an insect bite to the area    Negative: Followed a knee injury    Negative: Ankle or foot injury    Negative: Pregnant with leg swelling or edema    Negative: SEVERE swelling (e.g., swelling extends above knee, entire leg is swollen, weeping fluid)    Negative: Thigh or calf pain and only 1 side and present > 1 hour    Negative: Thigh, calf, or ankle swelling in only one leg    Negative: Thigh, calf, or ankle swelling in both legs, but one side is definitely more swollen    Negative: Cast on leg or ankle and has increasing pain    Negative: Can't walk or can barely stand (new onset)    Negative: Fever and red area (or area very tender to touch)    Negative: Patient sounds very sick or weak to the triager    Negative: Swelling of face, arm or hands (Exception: slight puffiness of fingers during hot weather)    Negative: Pregnant > 20 weeks and sudden weight gain (i.e., > 2 lbs, 1 kg in one week)    Negative: MODERATE swelling of both ankles (e.g., swelling extends up to the knees) AND new onset or worsening    Negative: Difficulty breathing with exertion AND worsening or new onset    Negative: Looks like a boil, infected  sore, deep ulcer, or other infected rash (spreading redness, pus)    Negative: Patient wants to be seen    MILD swelling of both ankles (i.e., pedal edema) AND new onset or worsening    Protocols used: LEG SWELLING AND EDEMA-A-OH

## 2020-11-10 ENCOUNTER — ANCILLARY PROCEDURE (OUTPATIENT)
Dept: GENERAL RADIOLOGY | Facility: OTHER | Age: 80
End: 2020-11-10
Attending: PHYSICIAN ASSISTANT
Payer: COMMERCIAL

## 2020-11-10 ENCOUNTER — OFFICE VISIT (OUTPATIENT)
Dept: FAMILY MEDICINE | Facility: OTHER | Age: 80
End: 2020-11-10
Payer: COMMERCIAL

## 2020-11-10 VITALS
SYSTOLIC BLOOD PRESSURE: 150 MMHG | TEMPERATURE: 98.2 F | DIASTOLIC BLOOD PRESSURE: 84 MMHG | OXYGEN SATURATION: 98 % | WEIGHT: 207 LBS | RESPIRATION RATE: 16 BRPM | BODY MASS INDEX: 34.45 KG/M2 | HEART RATE: 57 BPM

## 2020-11-10 DIAGNOSIS — I10 ESSENTIAL HYPERTENSION WITH GOAL BLOOD PRESSURE LESS THAN 140/90: ICD-10-CM

## 2020-11-10 DIAGNOSIS — R60.9 EDEMA, UNSPECIFIED TYPE: Primary | ICD-10-CM

## 2020-11-10 DIAGNOSIS — E03.9 HYPOTHYROIDISM, UNSPECIFIED TYPE: ICD-10-CM

## 2020-11-10 DIAGNOSIS — R60.9 EDEMA, UNSPECIFIED TYPE: ICD-10-CM

## 2020-11-10 DIAGNOSIS — R94.31 ABNORMAL ELECTROCARDIOGRAM: ICD-10-CM

## 2020-11-10 LAB
ANION GAP SERPL CALCULATED.3IONS-SCNC: 7 MMOL/L (ref 3–14)
BUN SERPL-MCNC: 17 MG/DL (ref 7–30)
CALCIUM SERPL-MCNC: 9.3 MG/DL (ref 8.5–10.1)
CHLORIDE SERPL-SCNC: 108 MMOL/L (ref 94–109)
CO2 SERPL-SCNC: 29 MMOL/L (ref 20–32)
CREAT SERPL-MCNC: 1.01 MG/DL (ref 0.52–1.04)
GFR SERPL CREATININE-BSD FRML MDRD: 53 ML/MIN/{1.73_M2}
GLUCOSE SERPL-MCNC: 121 MG/DL (ref 70–99)
POTASSIUM SERPL-SCNC: 3.5 MMOL/L (ref 3.4–5.3)
SODIUM SERPL-SCNC: 144 MMOL/L (ref 133–144)
TSH SERPL DL<=0.005 MIU/L-ACNC: 3.46 MU/L (ref 0.4–4)

## 2020-11-10 PROCEDURE — 93000 ELECTROCARDIOGRAM COMPLETE: CPT | Performed by: PHYSICIAN ASSISTANT

## 2020-11-10 PROCEDURE — 71046 X-RAY EXAM CHEST 2 VIEWS: CPT | Performed by: RADIOLOGY

## 2020-11-10 PROCEDURE — 36415 COLL VENOUS BLD VENIPUNCTURE: CPT | Performed by: PHYSICIAN ASSISTANT

## 2020-11-10 PROCEDURE — 80048 BASIC METABOLIC PNL TOTAL CA: CPT | Performed by: PHYSICIAN ASSISTANT

## 2020-11-10 PROCEDURE — 84443 ASSAY THYROID STIM HORMONE: CPT | Performed by: PHYSICIAN ASSISTANT

## 2020-11-10 PROCEDURE — 99214 OFFICE O/P EST MOD 30 MIN: CPT | Performed by: PHYSICIAN ASSISTANT

## 2020-11-10 ASSESSMENT — PAIN SCALES - GENERAL: PAINLEVEL: NO PAIN (0)

## 2020-11-10 NOTE — PATIENT INSTRUCTIONS
Patient Education   Coping with Edema  What is edema?  Edema is the build-up of fluid in the body, which causes swelling. Swelling most commonly occurs in the feet, ankles, lower legs or hands.  Swelling can occur in the belly or chest may be a sign of a more severe problem.  Certain medicines or conditions can make the swelling worse.  Symptoms include:    Feet and lower legs get larger when you sit or walk.    Hands feel tight when you make a fist.    When you push on the skin, skin stays dented.    Shiny, tight skin.    Fast weight gain.  How is it treated?  Your care team may give you a medicine to reduce the swelling.  They may also suggest that you meet with a dietitian. He or she can help with food choices to reduce the swelling.  What can I do about the swelling?    Place your feet above your heart 3 times a day: Sit with your feet up on a stool with a pillow. Sit on the bed or couch with two pillows under your feet.    Do not stand for long periods of time.    Wear loose-fitting clothes.    Do not cross your legs.    Reduce the salt in your diet.   These foods are high in salt:  ? Chips, soup  ? Frozen meals, TV dinners  ? Yost, lunch meat, ham  ? Sauces (soy, canned spaghetti sauce)    Walk or do other exercise.    Wear compression stockings.    Drink water as normal.    Weigh yourself every day at the same time to keep track of weight gain.  When should I call my care team?  Call your care team if:    You have a hard time breathing.    You gained 5 pounds or more in 1 week.    Your hands or feet feel cold when you touch them.    You are peeing very little or not at all.    Swelling is moving up your arms or legs.    Your tongue is swelling.    You cannot eat for more than a day  If you have any side effects, call us. We can help you manage these problems.  For more information,  see:  www.[facility].com  www.cancer.org/treatment/treatmentsandsideeffects/physicalsideeffects/dealingwithsymptomsathome  https://www.cancer.gov/publications/patient-education/chemo-and-you  Comments:  __________________________________________  __________________________________________  __________________________________________  __________________________________________  __________________________________________  __________________________________________  __________________________________________  For informational purposes only. Not to replace the advice of your health care provider. Copyright   2014 [employer]. All rights reserved. Clinically reviewed by Rhina Francisco, Oncology Department. [employer] 854436 - REV 08/19.        Patient Education     Coping with Edema  What is edema?  Edema is the build-up of fluid in the body, which causes swelling. Swelling most commonly occurs in the feet, ankles, lower legs or hands.  Swelling can occur in the belly or chest may be a sign of a more severe problem.  Certain medicines or conditions can make the swelling worse.  Symptoms include:    Feet and lower legs get larger when you sit or walk.    Hands feel tight when you make a fist.    When you push on the skin, skin stays dented.    Shiny, tight skin.    Fast weight gain.  How is it treated?  Your care team may give you a medicine to reduce the swelling.  They may also suggest that you meet with a dietitian. He or she can help with food choices to reduce the swelling.  What can I do about the swelling?    Place your feet above your heart 3 times a day: Sit with your feet up on a stool with a pillow. Sit on the bed or couch with two pillows under your feet.    Do not stand for long periods of time.    Wear loose-fitting clothes.    Do not cross your legs.    Reduce the salt in your diet. These foods are high in salt:  ? Chips, soup  ? Frozen meals, TV dinners  ? Yost, lunch meat, ham  ? Sauces  (soy, canned spaghetti sauce)    Walk or do other exercise.    Wear compression stockings.    Drink water as normal.    Weigh yourself every day at the same time to keep track of weight gain.  When should I call my care team?  Call your care team if:    You have a hard time breathing.    You gained 5 pounds or more in 1 week.    Your hands or feet feel cold when you touch them.    You are peeing very little or not at all.    Swelling is moving up your arms or legs.    Your tongue is swelling.    You cannot eat for more than a day  If you have any side effects, call us. We can help you manage these problems.  For more information, see:  www.chemocare.com  www.cancer.org/treatment/treatmentsandsideeffects/physicalsideeffects/dealingwithsymptomsathome  www.cancer.gov/cancertopics/coping/chemotherapy-and-you  Comments:  __________________________________________  __________________________________________  __________________________________________  __________________________________________  __________________________________________  __________________________________________  __________________________________________  For informational purposes only. Not to replace the advice of your health care provider.  Copyright   2014 [employer]. All rights reserved. [employer] [id number] - REV 03/16.  For informational purposes only. Not to replace the advice of your health care provider.  Copyright   2018 [employer]. All rights reserved.

## 2020-11-10 NOTE — PROGRESS NOTES
Subjective     Mya Hui is a 80 year old female who presents to clinic today for the following health issues:    HPI         Concern - Edema   Onset:   1-2 weeks  Description: bilateral swollen legs  Intensity:   Progression of Symptoms:  worsening  Accompanying Signs & Symptoms: No other sx.   Denies pain tingling and numbness  Previous history of similar problem: No  Precipitating factors:        Worsened by: being up and around all day  Alleviating factors:        Improved by:  Laying all night  Therapies tried and outcome:  none     - Left leg worse   - No pain   - Socks feel tight   - Doesn't eat very healthy (too much salt)     - BP today at home on wrist cuff - 158/84     Usually get headache when BP is high, feels fine today, no headache, no chest pain, no shortness of breath       RN Triage note from 11/9/20 (yesterday)   Patient stated that she has been experiencing some mild edema in both of her legs for the last week. The patient describes the swelling depth less than 1/4 of an in. Patient denies redness, pain, fever, warmth, numbness/tingling, chest pain, or difficulty breathing.   Patient notices it more at night.      PLAN:   Per protocol patient should be seen within 3 days. Appointment was scheduled with Carlos on 11/10/20.   Homecare was provided.      Jose Jacques RN  November 9, 2020      Review of Systems   Constitutional, HEENT, cardiovascular, pulmonary, gi and gu systems are negative, except as otherwise noted.      Objective    BP (!) 170/94   Pulse 65   Temp 98.2  F (36.8  C)   Resp 16   Wt 93.9 kg (207 lb)   LMP  (LMP Unknown)   SpO2 98%   BMI 34.45 kg/m    Body mass index is 34.45 kg/m .  Physical Exam   GENERAL APPEARANCE: healthy, alert and no distress  EYES: Eyes grossly normal to inspection, PERRLA, conjunctivae and sclerae without injection or discharge, EOM intact   RESP: Lungs clear to auscultation - no rales, rhonchi or wheezes    CV: Regular rates and rhythm,  normal S1 S2, no S3 or S4, no murmur, click or rub, 1+ pitting edema bilaterally from mid shin to ankle worse on the left, no areas of focal edema, erythema, or tenderness, peripheral pulses strong and symmetric bilaterally   MS: No musculoskeletal defects are noted and gait is age appropriate without ataxia   SKIN: No suspicious lesions or rashes, hydration status appears adeuqate with normal skin turgor   PSYCH: Alert and oriented x3; speech- coherent , normal rate and volume; able to articulate logical thoughts, able to abstract reason, no tangential thoughts, no hallucinations or delusions, mentation appears normal, Mood is euthymic. Affect is appropriate for this mood state and bright. Thought content is free of suicidal ideation, hallucinations, and delusions. Dress is adequate and upkept. Eye contact is good during conversation.       Diagnostics:   See orders pending in Epic      CXR: Normal- no infiltrates, effusions, pneumothoraces, cardiomegaly or masses  awaiting formal interpretation from Radiologist at this time    EKG: Sinus bradycardia     Stress test: pending       Assessment & Plan     ICD-10-CM    1. Edema, unspecified type  R60.9 Basic metabolic panel  (Ca, Cl, CO2, Creat, Gluc, K, Na, BUN)     XR Chest 2 Views     EKG 12-lead complete w/read - Clinics     NM Lexiscan stress test   2. Essential hypertension with goal blood pressure less than 140/90  I10 Basic metabolic panel  (Ca, Cl, CO2, Creat, Gluc, K, Na, BUN)     XR Chest 2 Views     EKG 12-lead complete w/read - Clinics   3. Hypothyroidism, unspecified type  E03.9 TSH with free T4 reflex   4. Abnormal electrocardiogram  R94.31 NM Lexiscan stress test     - Patient with diabetes, hypertension, and hyperlipidemia reporting mild edema bilaterally and elevated BPs (both at home and in clinic)   - She has diet controlled diabetes, is on Lisinopril 40 + 10 MEQ potassium, and Pravastatin   - There is no echocardiograms, last EKG was sinus  bradycardia but normal     There is know history of any cardiac issues   - Denies SHORTNESS OF BREATH, chest pain, or localized erythema/tenderness   - Lungs CTA, 1+ pitting edema bilaterally worse on the left   - Discussed possible etiology       Venous insufficiency, CHF, lymphedema, etc.       Doesn't seem consistent with clot   - Cardiac enlargement on previous CXR on upper limits of normal, will update today      Await radiology report      I do not hear or see any evidence of fluid on the lungs   - Will get labs to check potassium status as well along with kidney function      Has diabetes that is diet controlled, known CKD and hypothyroidism     Await lab results   - Recommend due to all this we get a stress test as she has never had one and due to her age there is a risk of CHF      This will be scheduled   - Discussed management of edema for now       Compression socks       Elevation of legs   - She was wondering about a water pill      Discussed may be option but I want her stress test and renal function first      Discussed may not be indicated due to her CKD  - Elevated BP     Did come down with rest, still slightly above goal of 150/90      She is on Diltiazem & Lisinopril 40 mg       Will await stress test results but likely will need additional medication as above      Discussed continue to monitor at home and what BP is too high     - Discussed warning signs that would warrant return to clinic/ED     The patient indicates understanding of these issues and agrees with the plan.    Follow up: pending stress test     Kisha Stinson PA-C  [facility]

## 2020-11-12 ENCOUNTER — HOSPITAL ENCOUNTER (OUTPATIENT)
Dept: NUCLEAR MEDICINE | Facility: CLINIC | Age: 80
Setting detail: NUCLEAR MEDICINE
End: 2020-11-12
Attending: PHYSICIAN ASSISTANT
Payer: COMMERCIAL

## 2020-11-12 ENCOUNTER — HOSPITAL ENCOUNTER (OUTPATIENT)
Dept: CARDIOLOGY | Facility: CLINIC | Age: 80
Setting detail: NUCLEAR MEDICINE
Discharge: HOME | End: 2020-11-12
Attending: PHYSICIAN ASSISTANT | Admitting: PHYSICIAN ASSISTANT
Payer: COMMERCIAL

## 2020-11-12 ENCOUNTER — TELEPHONE (OUTPATIENT)
Dept: FAMILY MEDICINE | Facility: OTHER | Age: 80
End: 2020-11-12

## 2020-11-12 DIAGNOSIS — R60.9 EDEMA, UNSPECIFIED TYPE: ICD-10-CM

## 2020-11-12 DIAGNOSIS — E87.6 HYPOKALEMIA: ICD-10-CM

## 2020-11-12 DIAGNOSIS — R94.31 ABNORMAL ELECTROCARDIOGRAM: ICD-10-CM

## 2020-11-12 DIAGNOSIS — I10 ESSENTIAL HYPERTENSION WITH GOAL BLOOD PRESSURE LESS THAN 140/90: ICD-10-CM

## 2020-11-12 DIAGNOSIS — R60.0 BILATERAL LOWER EXTREMITY EDEMA: Primary | ICD-10-CM

## 2020-11-12 DIAGNOSIS — N32.81 OAB (OVERACTIVE BLADDER): ICD-10-CM

## 2020-11-12 LAB
CV STRESS MAX HR HE: 90
NUC STRESS EJECTION FRACTION: 67 %
RATE PRESSURE PRODUCT: NORMAL
STRESS ECHO BASELINE DIASTOLIC HE: 90
STRESS ECHO BASELINE HR: 61
STRESS ECHO BASELINE SYSTOLIC BP: 192
STRESS ECHO CALCULATED PERCENT HR: 63 %
STRESS ECHO LAST STRESS DIASTOLIC BP: 82
STRESS ECHO LAST STRESS SYSTOLIC BP: 176
STRESS ECHO TARGET HR: 142

## 2020-11-12 PROCEDURE — 93016 CV STRESS TEST SUPVJ ONLY: CPT | Performed by: INTERNAL MEDICINE

## 2020-11-12 PROCEDURE — 250N000011 HC RX IP 250 OP 636: Performed by: PHYSICIAN ASSISTANT

## 2020-11-12 PROCEDURE — 343N000001 HC RX 343: Performed by: PHYSICIAN ASSISTANT

## 2020-11-12 PROCEDURE — 78452 HT MUSCLE IMAGE SPECT MULT: CPT

## 2020-11-12 PROCEDURE — 93018 CV STRESS TEST I&R ONLY: CPT | Performed by: INTERNAL MEDICINE

## 2020-11-12 PROCEDURE — A9502 TC99M TETROFOSMIN: HCPCS | Performed by: PHYSICIAN ASSISTANT

## 2020-11-12 PROCEDURE — 93017 CV STRESS TEST TRACING ONLY: CPT | Performed by: REHABILITATION PRACTITIONER

## 2020-11-12 PROCEDURE — 78452 HT MUSCLE IMAGE SPECT MULT: CPT | Mod: 26 | Performed by: INTERNAL MEDICINE

## 2020-11-12 RX ORDER — FUROSEMIDE 20 MG
10 TABLET ORAL DAILY
Qty: 15 TABLET | Refills: 1 | Status: SHIPPED | OUTPATIENT
Start: 2020-11-12 | End: 2020-11-30

## 2020-11-12 RX ORDER — REGADENOSON 0.08 MG/ML
0.4 INJECTION, SOLUTION INTRAVENOUS ONCE
Status: COMPLETED | OUTPATIENT
Start: 2020-11-12 | End: 2020-11-12

## 2020-11-12 RX ORDER — POTASSIUM CHLORIDE 750 MG/1
10 TABLET, EXTENDED RELEASE ORAL 3 TIMES DAILY
Qty: 180 TABLET | Refills: 3 | Status: SHIPPED | OUTPATIENT
Start: 2020-11-12 | End: 2021-08-20

## 2020-11-12 RX ADMIN — TETROFOSMIN 31.2 MCI.: 1.38 INJECTION, POWDER, LYOPHILIZED, FOR SOLUTION INTRAVENOUS at 10:35

## 2020-11-12 RX ADMIN — REGADENOSON 0.4 MG: 0.08 INJECTION, SOLUTION INTRAVENOUS at 09:09

## 2020-11-12 RX ADMIN — TETROFOSMIN 10 MCI.: 1.38 INJECTION, POWDER, LYOPHILIZED, FOR SOLUTION INTRAVENOUS at 09:00

## 2020-11-12 NOTE — TELEPHONE ENCOUNTER
Please call patient     Stress echo was normal. I would like her to start Lasix - 1/2 tablet once a day in the morning. She should also increase her Potassium pill to 1 tablet three times a day.     She needs to keep a close eye on her blood pressure and plan to recheck with me in 2 weeks, we will need to recheck her potassium at that appointment.    Carlos Stinson PA-C  [facility]

## 2020-11-24 NOTE — PROGRESS NOTES
Subjective     Mya Hui is a 80 year old female who presents to clinic today for the following health issues:    HPI         {SUPERLIST (Optional):440925}  {additonal problems for provider to add (Optional):289930}    Review of Systems   {ROS COMP (Optional):827277}      Objective    LMP  (LMP Unknown)   There is no height or weight on file to calculate BMI.  Physical Exam   {Exam List (Optional):426448}    {Diagnostic Test Results (Optional):424035}        {PROVIDER CHARTING PREFERENCE:504058}

## 2020-11-30 ENCOUNTER — VIRTUAL VISIT (OUTPATIENT)
Dept: FAMILY MEDICINE | Facility: OTHER | Age: 80
End: 2020-11-30
Payer: COMMERCIAL

## 2020-11-30 DIAGNOSIS — N18.31 STAGE 3A CHRONIC KIDNEY DISEASE (H): ICD-10-CM

## 2020-11-30 DIAGNOSIS — I87.2 CHRONIC VENOUS INSUFFICIENCY: ICD-10-CM

## 2020-11-30 DIAGNOSIS — I10 ESSENTIAL HYPERTENSION WITH GOAL BLOOD PRESSURE LESS THAN 140/90: ICD-10-CM

## 2020-11-30 DIAGNOSIS — E11.9 TYPE 2 DIABETES MELLITUS WITHOUT COMPLICATION, WITHOUT LONG-TERM CURRENT USE OF INSULIN (H): ICD-10-CM

## 2020-11-30 DIAGNOSIS — R60.0 BILATERAL LOWER EXTREMITY EDEMA: Primary | ICD-10-CM

## 2020-11-30 PROCEDURE — 99214 OFFICE O/P EST MOD 30 MIN: CPT | Mod: 95 | Performed by: PHYSICIAN ASSISTANT

## 2020-11-30 RX ORDER — FUROSEMIDE 20 MG
20 TABLET ORAL DAILY
Qty: 30 TABLET | Refills: 1 | Status: SHIPPED | OUTPATIENT
Start: 2020-11-30 | End: 2021-01-06

## 2020-11-30 NOTE — PROGRESS NOTES
"Mya Hui is a 80 year old female who is being evaluated via a billable telephone visit.      The patient has been notified of following:     \"This telephone visit will be conducted via a call between you and your physician/provider. We have found that certain health care needs can be provided without the need for a physical exam.  This service lets us provide the care you need with a short phone conversation.  If a prescription is necessary we can send it directly to your pharmacy.  If lab work is needed we can place an order for that and you can then stop by our lab to have the test done at a later time.    Telephone visits are billed at different rates depending on your insurance coverage. During this emergency period, for some insurers they may be billed the same as an in-person visit.  Please reach out to your insurance provider with any questions.    If during the course of the call the physician/provider feels a telephone visit is not appropriate, you will not be charged for this service.\"    Patient has given verbal consent for Telephone visit?  Yes    What phone number would you like to be contacted at? [phone]    How would you like to obtain your AVS? Mail a copy    Subjective     Mya Hui is a 80 year old female who presents via phone visit today for the following health issues:    HPI    Edema    Ankle to knee in both legs are still swollen since the stress test. The water pill doesn't seem to help at all. No improvement.   - Mid shin to ankle (not really top of foot)   - Been keeping legs up and lifting foot of bed, doesn't seem to help   - Pain behind knee on right leg, comes and goes  - Blood sugars have been high, lowest 122, highest 135   - Swelling is same on both legs        Hypertension Follow-up      Do you check your blood pressure regularly outside of the clinic? Yes, 11/23 - 131/79, 11/20 -126/64, otherwise 130's/high 70's     Are you following a low salt diet? Yes    Are " your blood pressures ever more than 140 on the top number (systolic) OR more   than 90 on the bottom number (diastolic), for example 140/90? No      Review of Systems   Constitutional, HEENT, cardiovascular, pulmonary, gi and gu systems are negative, except as otherwise noted.       Objective    Vitals:  No vitals were obtained today due to virtual visit.    healthy, alert and no distress  PSYCH: Alert and oriented times 3; coherent speech, normal   rate and volume, able to articulate logical thoughts, able   to abstract reason, no tangential thoughts, no hallucinations   or delusions  Her affect is normal  RESP: No cough, no audible wheezing, able to talk in full sentences  Remainder of exam unable to be completed due to telephone visits    Diagnostics  None       Assessment & Plan     ICD-10-CM    1. Bilateral lower extremity edema  R60.0 Compression Sleeve/Stocking Order for DME - ONLY FOR DME     furosemide (LASIX) 20 MG tablet     Basic metabolic panel  (Ca, Cl, CO2, Creat, Gluc, K, Na, BUN)     US Lower Extremity Venous Duplex Bilateral   2. Chronic venous insufficiency  I87.2    3. Essential hypertension with goal blood pressure less than 140/90  I10 furosemide (LASIX) 20 MG tablet     Basic metabolic panel  (Ca, Cl, CO2, Creat, Gluc, K, Na, BUN)   4. Stage 3a chronic kidney disease  N18.31 Basic metabolic panel  (Ca, Cl, CO2, Creat, Gluc, K, Na, BUN)   5. Type 2 diabetes mellitus without complication, without long-term current use of insulin (H)  E11.9 Basic metabolic panel  (Ca, Cl, CO2, Creat, Gluc, K, Na, BUN)     Hemoglobin A1c     - Patient with persistent bilateral lower extremity edema with known CKD, Diabetes, and HTN   - Had normal stress echo on 11/12/20   - Labs done 11/10/20 were also normal   - Patient reporting that low dose (10 mg) furosemide did not help   - Has been doing conservative cares   - Recommend we get ultrasound to confirm likely venous insuffiencey (doesn't seem consistent with  lymphedema but can't completely rule out, doesn't sound like pitting from patient, reports symptoms are the same bilaterally)      Does have an occasional pain behind right knee, could be popliteal cyst, ultrasound to assist in confirming   - Recommend increase Furosemide to full tablet of 20 mg once a day      Discussed use and side effects, could still worsen CKD and needs to watch BP carefully, discussed symptoms of hypotension and when to stop medication, need to watch for falls      Due to this, will recommend short recheck in 2 weeks and we will get BP check and labs in clinic when comes for ultrasound   - Discussed likely will need symptom management and medication likely will not help us (though we will still give it a try for 2 more weeks)   - Will get patient some custom compression socks as she is not able to get OTC ones on herself      Will order for zip up sleeves   - Still should keep legs up     The patient indicates understanding of these issues and agrees with the plan.    Return in about 2 weeks (around 12/14/2020).    Kisha Stinson PA-C  [facility]    Phone call duration:  13 minutes

## 2020-12-03 ENCOUNTER — ANCILLARY PROCEDURE (OUTPATIENT)
Dept: ULTRASOUND IMAGING | Facility: OTHER | Age: 80
End: 2020-12-03
Attending: PHYSICIAN ASSISTANT
Payer: COMMERCIAL

## 2020-12-03 DIAGNOSIS — R60.0 BILATERAL LOWER EXTREMITY EDEMA: ICD-10-CM

## 2020-12-10 NOTE — PROGRESS NOTES
"Mya Hui is a 80 year old female who is being evaluated via a billable telephone visit.      The patient has been notified of following:     \"This telephone visit will be conducted via a call between you and your physician/provider. We have found that certain health care needs can be provided without the need for a physical exam.  This service lets us provide the care you need with a short phone conversation.  If a prescription is necessary we can send it directly to your pharmacy.  If lab work is needed we can place an order for that and you can then stop by our lab to have the test done at a later time.    Telephone visits are billed at different rates depending on your insurance coverage. During this emergency period, for some insurers they may be billed the same as an in-person visit.  Please reach out to your insurance provider with any questions.    If during the course of the call the physician/provider feels a telephone visit is not appropriate, you will not be charged for this service.\"    Patient has given verbal consent for Telephone visit?  Yes    What phone number would you like to be contacted at? [phone]    How would you like to obtain your AVS? Mail a copy    Subjective     Mya Hui is a 80 year old female who presents via phone visit today for the following health issues:    HPI     Hypertension Follow-up      Do you check your blood pressure regularly outside of the clinic? Yes     Are you following a low salt diet? Yes    Are your blood pressures ever more than 140 on the top number (systolic) OR more   than 90 on the bottom number (diastolic), for example 140/90? No the last 3 days has been 137/78     Edema Recheck   Left leg seems to be worse then the right today but its back and forth. Edema is from the knee down.  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 With rest they usually get a bit better but with any activity they still swell.     - " Talked to someone about compression socks but hasn't gotten them yet   - Doesn't bother her, just swollen   - Going to the bathroom a lot   - 12/11/20 - 126/77            Review of Systems   Constitutional, HEENT, cardiovascular, pulmonary, gi and gu systems are negative, except as otherwise noted.       Objective      Vitals:  No vitals were obtained today due to virtual visit.    healthy, alert and no distress  PSYCH: Alert and oriented times 3; coherent speech, normal   rate and volume, able to articulate logical thoughts, able   to abstract reason, no tangential thoughts, no hallucinations   or delusions  Her affect is normal  RESP: No cough, no audible wheezing, able to talk in full sentences  Remainder of exam unable to be completed due to telephone visits      Diagnostics  See orders pending in Epic         Assessment & Plan     ICD-10-CM    1. Chronic venous insufficiency  I87.2    2. Bilateral lower extremity edema  R60.0    3. Essential hypertension with goal blood pressure less than 140/90  I10      - Patient with persistent bilateral lower extremity edema with known CKD, Diabetes, and HTN   - Had normal stress echo on 11/12/20   - Labs done 11/10/20 were also normal   - Previously, low dose (10 mg) furosemide did not help, increased to 20 mg     Reports doesn't think helping much but blood pressures have been better   - Has been doing conservative cares      Ultrasound was normal, though it does appear wrong US was ordered as this just looked for DVT, may need to consider further imaging       Still likely venous insuffiencey (doesn't seem consistent with lymphedema but can't completely rule out, doesn't sound like pitting from patient, reports symptoms are the same bilaterally)       Recommend continue with Furosemide, will check BMP this week       Still waiting to hear on compression sleeves for her      Will recheck in 3 weeks in office so I can see her legs in person for further assessment           She does deny pain, weeping, or ulcerations       Continue with conservative cares of keeping legs up when seated        The patient indicates understanding of these issues and agrees with the plan.    Return in about 3 weeks (around 1/4/2021). and labs this week     Kisha Stinson PA-C  [facility]    Phone call duration:  7 minutes and 30 seconds

## 2020-12-11 ENCOUNTER — TELEPHONE (OUTPATIENT)
Dept: FAMILY MEDICINE | Facility: OTHER | Age: 80
End: 2020-12-11

## 2020-12-11 NOTE — TELEPHONE ENCOUNTER
Called [facility] to see if they could see order for compression stockings and the gentleman told me that those need to faxed to them they those ones don't get dropped into the que.... that we need to fax to them which I did [phone]...     I spoke to patient and apologized for the delay, and that I faxed orders to them now, so they should be getting a hold of you to ship them out to you. She understood..

## 2020-12-11 NOTE — TELEPHONE ENCOUNTER
Reason for Call: Request for an order or referral:    Order or referral being requested: Compressions stocking    Date needed: as soon as possible    Has the patient been seen by the PCP for this problem? YES    Additional comments: Patient called and is wondering how she can get the compression stockings. There are orders in her chart but she wants to know if they will be sent to her or were does she go to pick them up. Please advise thank you    Phone number Patient can be reached at:  Home number on file [phone] (home)    Best Time:  anytime    Can we leave a detailed message on this number?  YES    Call taken on 12/11/2020 at 12:04 PM by Nida Arana

## 2020-12-14 ENCOUNTER — VIRTUAL VISIT (OUTPATIENT)
Dept: FAMILY MEDICINE | Facility: OTHER | Age: 80
End: 2020-12-14
Payer: COMMERCIAL

## 2020-12-14 DIAGNOSIS — I10 ESSENTIAL HYPERTENSION WITH GOAL BLOOD PRESSURE LESS THAN 140/90: ICD-10-CM

## 2020-12-14 DIAGNOSIS — R60.0 BILATERAL LOWER EXTREMITY EDEMA: ICD-10-CM

## 2020-12-14 DIAGNOSIS — I87.2 CHRONIC VENOUS INSUFFICIENCY: Primary | ICD-10-CM

## 2020-12-14 PROCEDURE — 99214 OFFICE O/P EST MOD 30 MIN: CPT | Mod: 95 | Performed by: PHYSICIAN ASSISTANT

## 2020-12-15 ENCOUNTER — ALLIED HEALTH/NURSE VISIT (OUTPATIENT)
Dept: FAMILY MEDICINE | Facility: OTHER | Age: 80
End: 2020-12-15
Payer: COMMERCIAL

## 2020-12-15 VITALS — SYSTOLIC BLOOD PRESSURE: 142 MMHG | DIASTOLIC BLOOD PRESSURE: 82 MMHG

## 2020-12-15 DIAGNOSIS — E11.9 TYPE 2 DIABETES MELLITUS WITHOUT COMPLICATION, WITHOUT LONG-TERM CURRENT USE OF INSULIN (H): ICD-10-CM

## 2020-12-15 DIAGNOSIS — R60.0 BILATERAL LOWER EXTREMITY EDEMA: ICD-10-CM

## 2020-12-15 DIAGNOSIS — N18.31 STAGE 3A CHRONIC KIDNEY DISEASE (H): ICD-10-CM

## 2020-12-15 DIAGNOSIS — I10 ESSENTIAL HYPERTENSION WITH GOAL BLOOD PRESSURE LESS THAN 140/90: ICD-10-CM

## 2020-12-15 DIAGNOSIS — I10 ESSENTIAL HYPERTENSION WITH GOAL BLOOD PRESSURE LESS THAN 140/90: Primary | ICD-10-CM

## 2020-12-15 LAB
ANION GAP SERPL CALCULATED.3IONS-SCNC: 4 MMOL/L (ref 3–14)
BUN SERPL-MCNC: 17 MG/DL (ref 7–30)
CALCIUM SERPL-MCNC: 9.3 MG/DL (ref 8.5–10.1)
CHLORIDE SERPL-SCNC: 110 MMOL/L (ref 94–109)
CO2 SERPL-SCNC: 29 MMOL/L (ref 20–32)
CREAT SERPL-MCNC: 0.97 MG/DL (ref 0.52–1.04)
GFR SERPL CREATININE-BSD FRML MDRD: 56 ML/MIN/{1.73_M2}
GLUCOSE SERPL-MCNC: 120 MG/DL (ref 70–99)
HBA1C MFR BLD: 6.2 % (ref 0–5.6)
POTASSIUM SERPL-SCNC: 4 MMOL/L (ref 3.4–5.3)
SODIUM SERPL-SCNC: 143 MMOL/L (ref 133–144)

## 2020-12-15 PROCEDURE — 83036 HEMOGLOBIN GLYCOSYLATED A1C: CPT | Performed by: PHYSICIAN ASSISTANT

## 2020-12-15 PROCEDURE — 80048 BASIC METABOLIC PNL TOTAL CA: CPT | Performed by: PHYSICIAN ASSISTANT

## 2020-12-15 PROCEDURE — 99207 PR NO CHARGE NURSE ONLY: CPT

## 2020-12-15 PROCEDURE — 36415 COLL VENOUS BLD VENIPUNCTURE: CPT | Performed by: PHYSICIAN ASSISTANT

## 2020-12-15 NOTE — LETTER
December 16, 2020      Mya Hui  [address]   [place] [address]        Dear ,    We are writing to inform you of your test results.    Your test results are normal/stable for you. Please continue with current treatment plan.    Resulted Orders   Hemoglobin A1c   Result Value Ref Range    Hemoglobin A1C 6.2 (H) 0 - 5.6 %      Comment:      Normal <5.7% Prediabetes 5.7-6.4%  Diabetes 6.5% or higher - adopted from ADA   consensus guidelines.     Basic metabolic panel  (Ca, Cl, CO2, Creat, Gluc, K, Na, BUN)   Result Value Ref Range    Sodium 143 133 - 144 mmol/L    Potassium 4.0 3.4 - 5.3 mmol/L    Chloride 110 (H) 94 - 109 mmol/L    Carbon Dioxide 29 20 - 32 mmol/L    Anion Gap 4 3 - 14 mmol/L    Glucose 120 (H) 70 - 99 mg/dL    Urea Nitrogen 17 7 - 30 mg/dL    Creatinine 0.97 0.52 - 1.04 mg/dL    GFR Estimate 56 (L) >60 mL/min/[1.73_m2]      Comment:      Non  GFR Calc  Starting 12/18/2018, serum creatinine based estimated GFR (eGFR) will be   calculated using the Chronic Kidney Disease Epidemiology Collaboration   (CKD-EPI) equation.      GFR Estimate If Black 65 >60 mL/min/[1.73_m2]      Comment:       GFR Calc  Starting 12/18/2018, serum creatinine based estimated GFR (eGFR) will be   calculated using the Chronic Kidney Disease Epidemiology Collaboration   (CKD-EPI) equation.      Calcium 9.3 8.5 - 10.1 mg/dL       If you have any questions or concerns, please call the clinic at the number listed above.       Sincerely,      Kisha Stinson PA-C

## 2020-12-16 NOTE — RESULT ENCOUNTER NOTE
Please call patient with the following message    Mail normal/stable labs.     Carlos Stinson PA-C

## 2020-12-31 NOTE — PROGRESS NOTES
Subjective     Mya Hui is a 80 year old female who presents to clinic today for the following health issues:    HPI   Hypertension Follow-up      Do you check your blood pressure regularly outside of the clinic? Yes     Are you following a low salt diet? Yes    Are your blood pressures ever more than 140 on the top number (systolic) OR more   than 90 on the bottom number (diastolic), for example 140/90? No    - Updates since last visit 12/14/20      Doing well      Some higher numbers around jenn (BG)      Got measured for compression socks that zip up yesterday     12/27/20 - 120/64, bg 145  12/28/20 - 132/70, bg 122  1/2/21 - 124/76, bg 134  1/3/21 - 120/60, bg 124   1/4/21 - 129/85, bg 108   1/6/21 - 139/87, bg 127     Diabetes Follow-up    How often are you checking your blood sugar? A few times a week  What time of day are you checking your blood sugars (select all that apply)?  Before meals  Have you had any blood sugars above 200?  No  Have you had any blood sugars below 70?  No    What symptoms do you notice when your blood sugar is low?  None    What concerns do you have today about your diabetes? None     Do you have any of these symptoms? (Select all that apply)  No numbness or tingling in feet.  No redness, sores or blisters on feet.  No complaints of excessive thirst.  No reports of blurry vision.  No significant changes to weight.      Hyperlipidemia Follow-Up      Are you regularly taking any medication or supplement to lower your cholesterol?   Yes- statin    Are you having muscle aches or other side effects that you think could be caused by your cholesterol lowering medication?  No          How many servings of fruits and vegetables do you eat daily?  0-1    On average, how many sweetened beverages do you drink each day (Examples: soda, juice, sweet tea, etc.  Do NOT count diet or artificially sweetened beverages)?   0    How many days per week do you exercise enough to make your heart  "beat faster? 3 or less    How many minutes a day do you exercise enough to make your heart beat faster? 9 or less    How many days per week do you miss taking your medication? 0      Plan from last visit   - Patient with persistent bilateral lower extremity edema with known CKD, Diabetes, and HTN   - Had normal stress echo on 11/12/20   - Labs done 11/10/20 were also normal   - Previously, low dose (10 mg) furosemide did not help, increased to 20 mg     Reports doesn't think helping much but blood pressures have been better   - Has been doing conservative cares      Ultrasound was normal, though it does appear wrong US was ordered as this just looked for DVT, may need to consider further imaging       Still likely venous insuffiencey (doesn't seem consistent with lymphedema but can't completely rule out, doesn't sound like pitting from patient, reports symptoms are the same bilaterally)       Recommend continue with Furosemide, will check BMP this week       Still waiting to hear on compression sleeves for her      Will recheck in 3 weeks in office so I can see her legs in person for further assessment          She does deny pain, weeping, or ulcerations       Continue with conservative cares of keeping legs up when seated       Review of Systems   Constitutional, HEENT, cardiovascular, pulmonary, gi and gu systems are negative, except as otherwise noted.      Objective    /74   Pulse 64   Temp 97.4  F (36.3  C) (Temporal)   Resp 18   Ht 1.692 m (5' 6.61\")   Wt 94.2 kg (207 lb 9.6 oz)   LMP  (LMP Unknown)   SpO2 97%   BMI 32.89 kg/m    Body mass index is 32.89 kg/m .  Physical Exam   GENERAL APPEARANCE: healthy, alert and no distress  EYES: Eyes grossly normal to inspection, PERRLA, conjunctivae and sclerae without injection or discharge, EOM intact   RESP: Lungs clear to auscultation - no rales, rhonchi or wheezes    CV: Regular rates and rhythm, normal S1 S2, no S3 or S4, no murmur, click or rub, 1+ " pitting peripheral edema bilaterally from mid shin to above ankle, peripheral pulses strong and symmetric bilaterally   MS: No musculoskeletal defects are noted and gait is age appropriate without ataxia   SKIN: No suspicious lesions or rashes, hydration status appears adeuqate with normal skin turgor   DIABETIC FOOT EXAM: Examination of the feet reveals normal posterior tibial and dorsalis pedis pulses. Skin to palpation and inspection is intact, without lesions, corns or calluses. No discoloration is present. Ten-gram monofilament nylon test reveals normal sensation bilaterally over plantar surfaces of distal great toe, first, third, and fifth metatarsal heads.  PSYCH: Alert and oriented x3; speech- coherent , normal rate and volume; able to articulate logical thoughts, able to abstract reason, no tangential thoughts, no hallucinations or delusions, mentation appears normal, Mood is euthymic. Affect is appropriate for this mood state and bright. Thought content is free of suicidal ideation, hallucinations, and delusions. Dress is adequate and upkept. Eye contact is good during conversation.       Diagnostics   Reviewed in Epic       12/15/20 - BMP, [facility]      11/10/20 - TSH, BMP  See orders pending in Epic         Assessment & Plan     ICD-10-CM    1. Chronic venous insufficiency  I87.2    2. Essential hypertension with goal blood pressure less than 140/90  I10 Comprehensive metabolic panel (BMP + Alb, Alk Phos, ALT, AST, Total. Bili, TP)     furosemide (LASIX) 20 MG tablet   3. Hyperlipidemia with target LDL less than 100  E78.5 cholestyramine (QUESTRAN) 4 g packet   4. Type 2 diabetes mellitus without complication, without long-term current use of insulin (H)  E11.9 Comprehensive metabolic panel (BMP + Alb, Alk Phos, ALT, AST, Total. Bili, TP)     Lipid panel reflex to direct LDL Fasting     Albumin Random Urine Quantitative with Creat Ratio   5. Stage 3a chronic kidney disease  N18.31 Comprehensive metabolic  panel (BMP + Alb, Alk Phos, ALT, AST, Total. Bili, TP)   6. Bilateral lower extremity edema  R60.0 furosemide (LASIX) 20 MG tablet     - Patient mainly here today for me to evaluate recent issues with edema   - On exam there is barely any edema today, so I am now less concerned about CHF  - Symptoms same consistent with venous insufficiency   - Patient was measured for compression sleeves that zip up the back yesterday, excited to try them out   - Discussed just continued symptomatic cares and BP control  - BP has been much better on Furosemide     Recommend continue on current regimen    Will need to update BMP today   - Diabetes      BG's good per home report      A1C done recently was 6.2      Encouraged efforts      Recommend recheck 3 months with A1C   - Lipids     Due for lab update, will get it today   - Results will be communicated to patient when available and appropriate medication changes made if necessary   - Reviewed all medication use and side effects, refilled as needed       The patient indicates understanding of these issues and agrees with the plan.    Return in about 3 months (around 4/6/2021).    Review of the result(s) of each unique test - See diagnostics above   Diagnosis or treatment significantly limited by social determinants of health - None     30 min spent on the date of the encounter in chart review, patient visit, review of tests, documentation and/or discussion with other providers about the issues documented above.     Kisha Torres-SAILAJA [facility]

## 2021-01-06 ENCOUNTER — OFFICE VISIT (OUTPATIENT)
Dept: FAMILY MEDICINE | Facility: OTHER | Age: 81
End: 2021-01-06
Payer: COMMERCIAL

## 2021-01-06 VITALS
OXYGEN SATURATION: 97 % | DIASTOLIC BLOOD PRESSURE: 74 MMHG | WEIGHT: 207.6 LBS | HEIGHT: 67 IN | HEART RATE: 64 BPM | SYSTOLIC BLOOD PRESSURE: 136 MMHG | TEMPERATURE: 97.4 F | BODY MASS INDEX: 32.58 KG/M2 | RESPIRATION RATE: 18 BRPM

## 2021-01-06 DIAGNOSIS — R60.0 BILATERAL LOWER EXTREMITY EDEMA: ICD-10-CM

## 2021-01-06 DIAGNOSIS — M46.1 SACROILIITIS, NOT ELSEWHERE CLASSIFIED (H): ICD-10-CM

## 2021-01-06 DIAGNOSIS — I87.2 CHRONIC VENOUS INSUFFICIENCY: Primary | ICD-10-CM

## 2021-01-06 DIAGNOSIS — I10 ESSENTIAL HYPERTENSION WITH GOAL BLOOD PRESSURE LESS THAN 140/90: ICD-10-CM

## 2021-01-06 DIAGNOSIS — E11.51 DIABETES MELLITUS WITH PERIPHERAL VASCULAR DISEASE (H): ICD-10-CM

## 2021-01-06 DIAGNOSIS — E11.9 TYPE 2 DIABETES MELLITUS WITHOUT COMPLICATION, WITHOUT LONG-TERM CURRENT USE OF INSULIN (H): ICD-10-CM

## 2021-01-06 DIAGNOSIS — N18.31 STAGE 3A CHRONIC KIDNEY DISEASE (H): ICD-10-CM

## 2021-01-06 DIAGNOSIS — E78.5 HYPERLIPIDEMIA WITH TARGET LDL LESS THAN 100: ICD-10-CM

## 2021-01-06 LAB
ALBUMIN SERPL-MCNC: 3.8 G/DL (ref 3.4–5)
ALP SERPL-CCNC: 71 U/L (ref 40–150)
ALT SERPL W P-5'-P-CCNC: 37 U/L (ref 0–50)
ANION GAP SERPL CALCULATED.3IONS-SCNC: 2 MMOL/L (ref 3–14)
AST SERPL W P-5'-P-CCNC: 24 U/L (ref 0–45)
BILIRUB SERPL-MCNC: 0.4 MG/DL (ref 0.2–1.3)
BUN SERPL-MCNC: 21 MG/DL (ref 7–30)
CALCIUM SERPL-MCNC: 9.2 MG/DL (ref 8.5–10.1)
CHLORIDE SERPL-SCNC: 112 MMOL/L (ref 94–109)
CHOLEST SERPL-MCNC: 162 MG/DL
CO2 SERPL-SCNC: 30 MMOL/L (ref 20–32)
CREAT SERPL-MCNC: 1.04 MG/DL (ref 0.52–1.04)
CREAT UR-MCNC: 383 MG/DL
GFR SERPL CREATININE-BSD FRML MDRD: 51 ML/MIN/{1.73_M2}
GLUCOSE SERPL-MCNC: 89 MG/DL (ref 70–99)
HDLC SERPL-MCNC: 41 MG/DL
LDLC SERPL CALC-MCNC: 71 MG/DL
MICROALBUMIN UR-MCNC: 24 MG/L
MICROALBUMIN/CREAT UR: 6.29 MG/G CR (ref 0–25)
NONHDLC SERPL-MCNC: 121 MG/DL
POTASSIUM SERPL-SCNC: 4.3 MMOL/L (ref 3.4–5.3)
PROT SERPL-MCNC: 7 G/DL (ref 6.8–8.8)
SODIUM SERPL-SCNC: 144 MMOL/L (ref 133–144)
TRIGL SERPL-MCNC: 248 MG/DL

## 2021-01-06 PROCEDURE — 82043 UR ALBUMIN QUANTITATIVE: CPT | Performed by: PHYSICIAN ASSISTANT

## 2021-01-06 PROCEDURE — 99207 PR FOOT EXAM NO CHARGE: CPT | Performed by: PHYSICIAN ASSISTANT

## 2021-01-06 PROCEDURE — 36415 COLL VENOUS BLD VENIPUNCTURE: CPT | Performed by: PHYSICIAN ASSISTANT

## 2021-01-06 PROCEDURE — 80053 COMPREHEN METABOLIC PANEL: CPT | Performed by: PHYSICIAN ASSISTANT

## 2021-01-06 PROCEDURE — 99214 OFFICE O/P EST MOD 30 MIN: CPT | Performed by: PHYSICIAN ASSISTANT

## 2021-01-06 PROCEDURE — 80061 LIPID PANEL: CPT | Performed by: PHYSICIAN ASSISTANT

## 2021-01-06 RX ORDER — FUROSEMIDE 20 MG
20 TABLET ORAL DAILY
Qty: 90 TABLET | Refills: 3 | Status: SHIPPED | OUTPATIENT
Start: 2021-01-06 | End: 2022-05-26

## 2021-01-06 RX ORDER — CHOLESTYRAMINE 4 G/9G
1 POWDER, FOR SUSPENSION ORAL 2 TIMES DAILY WITH MEALS
Qty: 60 EACH | Refills: 11 | Status: SHIPPED | OUTPATIENT
Start: 2021-01-06 | End: 2022-02-03

## 2021-01-06 RX ORDER — CHOLESTYRAMINE 4 G/9G
1 POWDER, FOR SUSPENSION ORAL 2 TIMES DAILY WITH MEALS
COMMUNITY
End: 2021-01-06

## 2021-01-06 ASSESSMENT — MIFFLIN-ST. JEOR: SCORE: 1448.17

## 2021-01-22 ENCOUNTER — OFFICE VISIT (OUTPATIENT)
Dept: FAMILY MEDICINE | Facility: OTHER | Age: 81
End: 2021-01-22
Payer: COMMERCIAL

## 2021-01-22 VITALS
DIASTOLIC BLOOD PRESSURE: 72 MMHG | OXYGEN SATURATION: 97 % | WEIGHT: 208 LBS | SYSTOLIC BLOOD PRESSURE: 136 MMHG | TEMPERATURE: 97 F | HEART RATE: 64 BPM | BODY MASS INDEX: 32.96 KG/M2

## 2021-01-22 DIAGNOSIS — R26.89 BALANCE PROBLEMS: ICD-10-CM

## 2021-01-22 DIAGNOSIS — H93.11 TINNITUS, RIGHT: Primary | ICD-10-CM

## 2021-01-22 DIAGNOSIS — H61.21 IMPACTED CERUMEN OF RIGHT EAR: ICD-10-CM

## 2021-01-22 PROCEDURE — 99214 OFFICE O/P EST MOD 30 MIN: CPT | Performed by: PHYSICIAN ASSISTANT

## 2021-01-22 ASSESSMENT — PAIN SCALES - GENERAL: PAINLEVEL: NO PAIN (0)

## 2021-01-22 NOTE — PROGRESS NOTES
"  Assessment & Plan     Tinnitus, right  I cannot see any visible evidence of problems related to the right tympanic membrane.  I was able to remove a small amount of impacted cerumen from the external auditory canal.  This did not reveal any changes related to the tympanic membrane to explain her tinnitus.  She describes this as weird popping feelings and sensation in the right ear.  - OTOLARYNGOLOGY REFERRAL    Balance problems  States that she has had problems with this for some time.  Will have ear nose and throat evaluate further.  May be part and parcel to the same problem.  - OTOLARYNGOLOGY REFERRAL    Impacted cerumen of right ear     BMI:   Estimated body mass index is 32.96 kg/m  as calculated from the following:    Height as of 1/6/21: 1.692 m (5' 6.61\").    Weight as of this encounter: 94.3 kg (208 lb).   Weight management plan: Discussed healthy diet and exercise guidelines      Regular exercise  Return in about 4 weeks (around 2/19/2021) for recheck of current condition, if symptoms do not improve.    Jim Mclean PA-C  [facility] is [facility] 80 year [facility] who presents to clinic today for the following health issues     HPI       Ringing in Right ear   Onset: for 2 months   Denies fever, pain, warm to touch or discoloration. No drainage.         Review of Systems   Constitutional, HEENT, cardiovascular, pulmonary, GI, , musculoskeletal, neuro, skin, endocrine and psych systems are negative, except as otherwise noted.      Objective    /72   Pulse 64   Temp 97  F (36.1  C) (Temporal)   Wt 94.3 kg (208 lb)   LMP  (LMP Unknown)   SpO2 97%   BMI 32.96 kg/m    Body mass index is 32.96 kg/m .  Physical Exam   GENERAL: healthy, alert and no distress  HENT: normal cephalic/atraumatic, right ear: normal: no effusions, no erythema, normal landmarks and partially occluded with wax which is easily removed with a curette today., left ear: normal: no effusions, " no erythema, normal landmarks, nose and mouth without ulcers or lesions, oropharynx clear and oral mucous membranes moist, hearing aids are easily removed and replaced by the patient.  She denies changes in the sensation of hearing with or without her hearing aids.  NECK: no adenopathy, no asymmetry, masses, or scars and trachea midline and normal to palpation  RESP: lungs clear to auscultation - no rales, rhonchi or wheezes  CV: regular rate and rhythm, normal S1 S2, no S3 or S4, no murmur, click or rub, no peripheral edema and peripheral pulses strong  MS: Ambulates well in clinic with the help of walker.  SKIN: no suspicious lesions or rashes to related skin today.  NEURO: Normal strength and tone, mentation intact and speech normal  PSYCH: mentation appears normal, affect normal/bright

## 2021-01-27 DIAGNOSIS — E03.9 HYPOTHYROIDISM, UNSPECIFIED TYPE: ICD-10-CM

## 2021-01-28 DIAGNOSIS — E78.5 HYPERLIPIDEMIA WITH TARGET LDL LESS THAN 100: ICD-10-CM

## 2021-01-29 RX ORDER — PRAVASTATIN SODIUM 40 MG
40 TABLET ORAL DAILY
Qty: 90 TABLET | Refills: 3 | Status: SHIPPED | OUTPATIENT
Start: 2021-01-29 | End: 2022-02-01

## 2021-01-29 RX ORDER — LEVOTHYROXINE SODIUM 75 UG/1
75 TABLET ORAL DAILY
Qty: 90 TABLET | Refills: 3 | Status: SHIPPED | OUTPATIENT
Start: 2021-01-29 | End: 2021-10-26

## 2021-01-29 NOTE — TELEPHONE ENCOUNTER
Prescription approved per [facility] [place].  Jose Jacques RN, BSN  [place]  January 29, 2021

## 2021-01-29 NOTE — TELEPHONE ENCOUNTER
Prescription approved per [facility] Refill Protocol.    Pati Carmona RN

## 2021-02-06 ENCOUNTER — NURSE TRIAGE (OUTPATIENT)
Dept: NURSING | Facility: CLINIC | Age: 81
End: 2021-02-06

## 2021-02-06 NOTE — TELEPHONE ENCOUNTER
Calling in stating that her Questran was not called into pharmacy.  Advised that I show that it was received.  Will call pharmacy and telephone Mya back.  Pharmacy does have Rx however this medication will not be available until Tuesday at the earliest.  Notified Mya of this.    Additional Information    Caller has medication question only, adult not sick, and triager answers question    Protocols used: MEDICATION QUESTION CALL-A-

## 2021-02-18 DIAGNOSIS — R60.0 BILATERAL LOWER EXTREMITY EDEMA: ICD-10-CM

## 2021-02-18 DIAGNOSIS — I10 ESSENTIAL HYPERTENSION WITH GOAL BLOOD PRESSURE LESS THAN 140/90: ICD-10-CM

## 2021-02-18 RX ORDER — FUROSEMIDE 20 MG
20 TABLET ORAL DAILY
Qty: 90 TABLET | Refills: 3 | OUTPATIENT
Start: 2021-02-18

## 2021-02-18 NOTE — TELEPHONE ENCOUNTER
Prescription was sent 1/6/21 for #90 with 3 refills.  Pharmacy notified via E-Prescribe refusal.     Dacia Serrato RN on 2/18/2021 at 11:17 AM

## 2021-02-19 DIAGNOSIS — K21.9 GASTROESOPHAGEAL REFLUX DISEASE: Primary | ICD-10-CM

## 2021-02-19 RX ORDER — PANTOPRAZOLE SODIUM 40 MG/1
40 TABLET, DELAYED RELEASE ORAL 2 TIMES DAILY
Qty: 180 TABLET | Refills: 1 | Status: SHIPPED | OUTPATIENT
Start: 2021-02-19 | End: 2021-08-20

## 2021-02-19 NOTE — TELEPHONE ENCOUNTER
Prescription approved per [facility] Refill Protocol.    ;Dacia Serrato RN on 2/19/2021 at 3:23 PM

## 2021-03-01 ENCOUNTER — TELEPHONE (OUTPATIENT)
Dept: FAMILY MEDICINE | Facility: OTHER | Age: 81
End: 2021-03-01

## 2021-03-01 DIAGNOSIS — N32.81 OAB (OVERACTIVE BLADDER): ICD-10-CM

## 2021-03-01 RX ORDER — OXYBUTYNIN CHLORIDE 10 MG/1
10 TABLET, EXTENDED RELEASE ORAL DAILY
Qty: 90 TABLET | Refills: 3 | Status: SHIPPED | OUTPATIENT
Start: 2021-03-01 | End: 2022-05-17

## 2021-03-01 NOTE — TELEPHONE ENCOUNTER
The Furosemide is helping to control her blood pressure and she needs to continue.     Yes, I can do the Oxybutynin. I sent refills for her.     Carlos Stinson PA-C  [facility]

## 2021-03-24 DIAGNOSIS — I10 ESSENTIAL HYPERTENSION WITH GOAL BLOOD PRESSURE LESS THAN 140/90: ICD-10-CM

## 2021-03-24 RX ORDER — LISINOPRIL 40 MG/1
40 TABLET ORAL DAILY
Qty: 90 TABLET | Refills: 2 | Status: SHIPPED | OUTPATIENT
Start: 2021-03-24 | End: 2021-12-23

## 2021-03-24 RX ORDER — DILTIAZEM HYDROCHLORIDE 120 MG/1
120 CAPSULE, COATED, EXTENDED RELEASE ORAL 2 TIMES DAILY
Qty: 180 CAPSULE | Refills: 1 | Status: SHIPPED | OUTPATIENT
Start: 2021-03-24 | End: 2021-09-30

## 2021-03-24 NOTE — TELEPHONE ENCOUNTER
Prescription approved per [facility] Refill Protocol.    Dacia Serrato RN on 3/24/2021 at 4:56 PM

## 2021-04-26 ENCOUNTER — OFFICE VISIT (OUTPATIENT)
Dept: FAMILY MEDICINE | Facility: OTHER | Age: 81
End: 2021-04-26
Payer: COMMERCIAL

## 2021-04-26 VITALS
HEIGHT: 67 IN | RESPIRATION RATE: 20 BRPM | OXYGEN SATURATION: 96 % | TEMPERATURE: 97.8 F | SYSTOLIC BLOOD PRESSURE: 136 MMHG | WEIGHT: 206.8 LBS | DIASTOLIC BLOOD PRESSURE: 72 MMHG | BODY MASS INDEX: 32.46 KG/M2 | HEART RATE: 69 BPM

## 2021-04-26 DIAGNOSIS — H93.13 TINNITUS, BILATERAL: ICD-10-CM

## 2021-04-26 DIAGNOSIS — M54.50 CHRONIC RIGHT-SIDED LOW BACK PAIN WITHOUT SCIATICA: ICD-10-CM

## 2021-04-26 DIAGNOSIS — N32.81 OAB (OVERACTIVE BLADDER): ICD-10-CM

## 2021-04-26 DIAGNOSIS — E11.9 TYPE 2 DIABETES MELLITUS WITHOUT COMPLICATION, WITHOUT LONG-TERM CURRENT USE OF INSULIN (H): ICD-10-CM

## 2021-04-26 DIAGNOSIS — G89.29 CHRONIC RIGHT-SIDED LOW BACK PAIN WITHOUT SCIATICA: ICD-10-CM

## 2021-04-26 DIAGNOSIS — M51.369 DDD (DEGENERATIVE DISC DISEASE), LUMBAR: ICD-10-CM

## 2021-04-26 DIAGNOSIS — I10 HTN, GOAL BELOW 150/90: Primary | ICD-10-CM

## 2021-04-26 PROCEDURE — 99215 OFFICE O/P EST HI 40 MIN: CPT | Performed by: PHYSICIAN ASSISTANT

## 2021-04-26 RX ORDER — POLYETHYLENE GLYCOL 3350 17 G/17G
1 POWDER, FOR SOLUTION ORAL
Status: ON HOLD | COMMUNITY
End: 2023-12-01

## 2021-04-26 RX ORDER — DIAZEPAM 5 MG
5 TABLET ORAL ONCE
Qty: 1 TABLET | Refills: 0 | Status: SHIPPED | OUTPATIENT
Start: 2021-04-26 | End: 2021-04-26

## 2021-04-26 ASSESSMENT — MIFFLIN-ST. JEOR: SCORE: 1439.54

## 2021-04-26 ASSESSMENT — PAIN SCALES - GENERAL: PAINLEVEL: MODERATE PAIN (5)

## 2021-04-26 NOTE — PROGRESS NOTES
- gaining weight, back pain  - shot in back last year, helped a lot        Weight gain  - [facility], been getting meals through them  - Would bring 14 frozen meals at a time   - Interested in seeing dietitian to help improve diet      Back pain  - cold weather makes pain worse  - difficult  - walking with walker outside of apartment  - not feeling steady on feet without walker  - Right leg been more bothersome, troubles going up stairs  - Last 2 months gotten progressively worse      Sleep  - Sleeping all day long in chair  - Bladder problems, up in middle of night to go  - Ra to get to bed at 1am     Legs swollen a little, ankles have improved since taking Lasix     Ringing in ears, balance issues - visit in Jan 22nd  - ENT referral was made, hasn't been able to get in for that though     Sore under right eye that was taken out, had trouble with eyes watering up since it was removed.

## 2021-04-26 NOTE — PROGRESS NOTES
Assessment & Plan     ICD-10-CM    1. Essential hypertension with goal blood pressure less than 140/90  I10 REVIEW OF HEALTH MAINTENANCE PROTOCOL ORDERS   2. DDD (degenerative disc disease), lumbar  M51.36 MR Lumbar Spine w/o Contrast     NEUROSURGERY REFERRAL     diazepam (VALIUM) 5 MG tablet   3. Chronic right-sided low back pain without sciatica  M54.5 MR Lumbar Spine w/o Contrast    G89.29 NEUROSURGERY REFERRAL     diazepam (VALIUM) 5 MG tablet   4. Type 2 diabetes mellitus without complication, without long-term current use of insulin (H)  E11.9 NUTRITION REFERRAL   5. Tinnitus, bilateral  H93.13 OTOLARYNGOLOGY REFERRAL   6. OAB (overactive bladder)  N32.81      1. Back pain  - Back pain has been increasing, feeling unsteady on feet, weakness in right thigh, needing walker outside of apartment  - Had 2 steroid injection in past to lower back which was helpful, able to walk without pain and without assistance from walker    Last one in 3/2020 with Dr. Cohen  - Last MRI of spine in 2018, recommend scheduling MRI for updated imaging due to progressive back pain   [facility]I on 5/3/2021 for MRI due to claustrophobia   - Will also give Valium 5 mg to take 30 min prior to imaging, discussed will make tired, needs a  if going to take this medication   - Await results   - Recommend discuss results with neurosurgery for further management, referral placed   - No signs of acute neurological issue or cauda equina     2. Sleep  - Troubles with sleep initiation, sleep hygiene and bedtime routine   Educated on Sleep hygiene and strategies to improve routine  - Awaking in middle of night to use bathroom multiple times, feeling tired during day and sleeping in recliner   Discussed increase of Oxybutynin, not interested in dose adjustment at this time  - Trial increase of Melatonin from 3mg to 5mg for 1 week, after 1 week can trial 10mg     3. Tinnitus, Balance & Watery eye   - Seen Jan 22nd for tinnitus and balance troubles,  ENT referral placed after visit but patient has not been in to see ENT yet  - ENT referral placed     4. Diabetes & weight & HTN   - Weight has been stable per our chart over the past year, only 1 lb up   - Educated on diet, activity and weight loss strategies   Nutrition referral placed per patient request   - Reviewed diabetes is well controlled last A1C was 6.2   - Reviewed patient's home BP log, stable bp at home and in clinic      Discussed her BP goal is <150/90    Review of the result(s) of each unique test - See list         1/6/21 - albumin, lipid, cmp          12/15/21 - BMP, A1C  Diagnosis or treatment significantly limited by social determinants of health - None     40 minutes spent on the date of the encounter doing chart review, history and exam, documentation and further activities as noted above    The patient indicates understanding of these issues and agrees with the plan.    Return in about 1 month (around 5/26/2021). for recheck of sleep     I, Carlos Stinson PA-C,  was present with the PA student who participated in the service and in the documentation of the note.  I have verified the history and personally performed the physical exam and medical decision making.  I agree with the assessment and plan of care as documented in the note.     HORACIO Fonseca-S2  [facility] BRANDEN RajanC  [facility]        Wan Roque is a 81 year old who presents for the following health issues:      HPI - Back/weight/sleep    Back Pain  Onset/Duration: 2 months   Description:   Location of pain: low back bilateral  Character of pain: dull ache and intermittent  Pain radiation: radiates into the right buttocks, radiates into the right leg and radiates into the right foot  New numbness or weakness in legs, not attributed to pain: YES- right leg weakness  Intensity: Currently 5/10  Progression of Symptoms: same and intermittent  History:  "  Specific cause: none  Pain interferes with job:  no   History of back problems: prior back pain   Any previous MRI or X-rays: Yes- at [facility].  Date unsure  Sees a specialist for back pain: No  Alleviating factors:   Improved by: acetaminophen (Tylenol), chiropractor, NSAIDs and rest    Precipitating factors:  Worsened by: Nothing  Therapies tried and outcome: roll on lidocaine     Accompanying Signs & Symptoms:  Risk of Fracture: None  Risk of Cauda Equina: None  Risk of Infection: None  Risk of Cancer: None  Risk of Ankylosing Spondylitis: Onset at age <35, male, AND morning back stiffness  no     - gaining weight, back pain  - shot in back last year, helped a lot      Weight gain  - [facility], been getting meals through them  - Would bring 14 frozen meals at a time   - Interested in seeing dietitian to help improve diet      Back pain  - cold weather makes pain worse  - difficult  - walking with walker outside of apartment  - not feeling steady on feet without walker  - Right leg been more bothersome, troubles going up stairs  - Last 2 months gotten progressively worse      Sleep  - Sleeping all day long in chair  - Bladder problems, up in middle of night to go  - Ra to get to bed at 1am   - Melatonin - 3 mg - did nothing     Legs swollen a little, ankles have improved since taking Lasix     Ringing in ears, balance issues, eyes watering since sore under right eye removed    Was seen on Jan 22nd  - ENT referral was made, hasn't been able to get in for that though         Review of Systems   Constitutional, HEENT, cardiovascular, pulmonary, gi and gu systems are negative, except as otherwise noted.      Objective    /72   Pulse 69   Temp 97.8  F (36.6  C) (Temporal)   Resp 20   Ht 1.692 m (5' 6.61\")   Wt 93.8 kg (206 lb 12.8 oz)   LMP  (LMP Unknown)   SpO2 96%   BMI 32.77 kg/m    Body mass index is 32.77 kg/m .  Physical Exam   GENERAL APPEARANCE: healthy, alert and " no distress  EYES: Eyes grossly normal to inspection, Right eye with increased lacrimation, conjunctivae and sclerae without injection or discharge, EOM intact,    HENT: Bilateral ear canals without erythema or cerumen, bilateral TM's pearly grey with normal light reflex, no effusion, injection, or bulging, nasal turbinates without swelling, erythema, or discharge, mouth without ulcers or lesions, oropharynx clear and oral mucous membranes moist, no sinus tenderness   NECK: No adenopathy in cervical, supraclavicular, or axillary regions, no asymmetry, masses, or scars, and thyroid normal to palpation, no JVD   RESP: Lungs clear to auscultation - no rales, rhonchi or wheezes   CV: Regular rates and rhythm, normal S1 S2, no S3 or S4, no murmur, click or rub, no peripheral edema and peripheral pulses strong and symmetric bilaterally   MS: No musculoskeletal defects are noted and gait is age appropriate without ataxia   SKIN: No suspicious lesions or rashes, hydration status appears adeuqate with normal skin turgor   NEURO: Strength 5+ bilateral upper and lower extremities, sensation intact in distal bilateral upper and lower extremities, mentation- intact, speech- normal, reflexes- symmetric in bilateral upper and lower extremities, cranial nerves II-XII tested and are intact   BACK: No CVA tenderness, no paralumbar tenderness, no midline tenderness, normal ROM   PSYCH: Alert and oriented x3; speech- coherent , normal rate and volume; able to articulate logical thoughts, able to abstract reason, no tangential thoughts, no hallucinations or delusions, mentation appears normal, Mood is euthymic. Affect is appropriate for this mood state and bright. Thought content is free of suicidal ideation, hallucinations, and delusions. Dress is adequate and upkept. Eye contact is good during conversation.       Diagnostics  Reviewed in Epic  See orders pending in Epic

## 2021-04-26 NOTE — PATIENT INSTRUCTIONS
"  - They will call you from nutrition     - Increase Melatonin start with 5 mg, then after a week can increase to 10 mg      Take 30 min before bed     - No TV 30-60 min before bed     - Water restriction     - [facility] appointment  5/3/2021 @ 10 am [address]           Patient Education     Tips for Sleep Hygiene  \"Sleep hygiene\" means having good sleep habits.Follow these tips to sleep better at night:     Get on a schedule. Go to bed and get up at about the same time every day.    Listen to your body. Only try to sleep when you actually feel tired or sleepy.    Be patient. If you haven't been able to get to sleep after about 30 minutes or more, get up and do something calming or boring until you feel sleepy. Then return to bed and try again.    Don't have caffeine (coffee, tea, cola drinks, chocolate and some medicines), alcohol or nicotine (cigarettes). These can make it harder for you to fall asleep and stay asleep.    Use your bed for sleeping only. That means no TV, computer or homework in bed, especially during the evening and before bedtime.    Don't nap during the day. If you must nap, make sure it is for less than 20 minutes.    Create sleep rituals that remind your body it is time to sleep. Examples include breathing exercises, stretching or reading a book.    Avoid all electronic media (smart phone, computer, tablet) within 2 hours of bed time. The \"blue light\" in these devices activates the part of the brain that keeps you awake.    Dim the lights at night.    Get early morning sources of light (walk in the sunshine) to help set sleep patterns at night.    Try a bath or shower before bed. Having a warm bath 1 to 2 hours before bedtime can help you feel sleepy. Hot baths can make you alert, so be mindful of the temperature.    Don't watch the clock. Checking the clock during the night can wake you up. It can also lead to negative thoughts such as, \"I will never fall asleep,\" which can increase anxiety " and sleeplessness.    Use a sleep diary. Track your sleep schedule to know your sleep patterns and to see where you can improve.    Get regular exercise every day. Try not to do heavy exercise in the 4 hours before bedtime.    Eat a healthy, balanced diet.    Try eating a light, healthy snack before bed, but avoid eating a heavy meal.    Create the right sleeping area. A cool, dark, quiet room is best. If needed, try earplugs, fans and blackout curtains.    Keep your daytime routine the same even if you have a bad night sleep. Avoiding activities the next day can make it harder to sleep.  For informational purposes only. Not to replace the advice of your health care provider.   Copyright   2013 [facility]. All rights reserved. [employer] [id number] - 01/16.

## 2021-05-03 ENCOUNTER — TRANSFERRED RECORDS (OUTPATIENT)
Dept: HEALTH INFORMATION MANAGEMENT | Facility: CLINIC | Age: 81
End: 2021-05-03

## 2021-05-05 ENCOUNTER — TRANSFERRED RECORDS (OUTPATIENT)
Dept: HEALTH INFORMATION MANAGEMENT | Facility: CLINIC | Age: 81
End: 2021-05-05

## 2021-05-05 LAB — RETINOPATHY: NORMAL

## 2021-05-06 ENCOUNTER — OFFICE VISIT (OUTPATIENT)
Dept: NEUROSURGERY | Facility: CLINIC | Age: 81
End: 2021-05-06
Attending: PHYSICIAN ASSISTANT
Payer: COMMERCIAL

## 2021-05-06 VITALS
WEIGHT: 206 LBS | TEMPERATURE: 97.6 F | SYSTOLIC BLOOD PRESSURE: 128 MMHG | BODY MASS INDEX: 32.33 KG/M2 | DIASTOLIC BLOOD PRESSURE: 72 MMHG | HEIGHT: 67 IN

## 2021-05-06 DIAGNOSIS — M51.9 DISC DISORDER OF LUMBAR REGION: ICD-10-CM

## 2021-05-06 DIAGNOSIS — M51.16 INTERVERTEBRAL DISC DISORDERS WITH RADICULOPATHY, LUMBAR REGION: ICD-10-CM

## 2021-05-06 DIAGNOSIS — M41.9 SCOLIOSIS OF LUMBAR SPINE, UNSPECIFIED SCOLIOSIS TYPE: Primary | ICD-10-CM

## 2021-05-06 PROCEDURE — 99203 OFFICE O/P NEW LOW 30 MIN: CPT | Performed by: NEUROLOGICAL SURGERY

## 2021-05-06 ASSESSMENT — MIFFLIN-ST. JEOR: SCORE: 1435.69

## 2021-05-06 ASSESSMENT — PAIN SCALES - GENERAL: PAINLEVEL: EXTREME PAIN (8)

## 2021-05-06 NOTE — PATIENT INSTRUCTIONS
Orders for [facility]. They will call you to schedule within a few days. Phone number: [phone]. If no pain relief within 4 weeks, please call our clinic nurse to come up with the next step.   Ordered a right L4-5 & L5-S1 Epidural Steroid Injection. [facility] will call you within 48 hours to schedule this. If you don't here from them, please schedule by calling Operating Room scheduling team s phone number: [phone], option 2. If no decline in symptoms 2 weeks after injections, call our clinic and talk to a nurse.  Dr. Waite is referring you to [facility]. If you don't hear from their scheduling office within 2 business days, call  [phone] to schedule.     BALAJI Nazario  --  [facility]  Phone: [phone]  Fax: [phone]

## 2021-05-06 NOTE — PROGRESS NOTES
"Mya Hui is a 81 year old female who presents for:  Chief Complaint   Patient presents with     Neurologic Problem     Lumbar : Chronic Right LBP w/o sciatica        Initial Vitals:  /72   Temp 97.6  F (36.4  C) (Temporal)   Ht 5' 6.6\" (1.692 m)   Wt 206 lb (93.4 kg)   LMP  (LMP Unknown)   BMI 32.65 kg/m   Estimated body mass index is 32.65 kg/m  as calculated from the following:    Height as of this encounter: 5' 6.6\" (1.692 m).    Weight as of this encounter: 206 lb (93.4 kg).. Body surface area is 2.09 meters squared. BP completed using cuff size: regular  Extreme Pain (8)    Nursing Comments:     Shelia Mcdaniel CMA    "

## 2021-05-06 NOTE — LETTER
"    5/6/2021         RE: Mya Hui  [address]  [facility] [address]        Dear Colleague,    Thank you for referring your patient, Mya Hui, to the [facility] [place]. Please see a copy of my visit note below.    Mya Hui is a 81 year old female who presents for:  Chief Complaint   Patient presents with     Neurologic Problem     Lumbar : Chronic Right LBP w/o sciatica        Initial Vitals:  /72   Temp 97.6  F (36.4  C) (Temporal)   Ht 5' 6.6\" (1.692 m)   Wt 206 lb (93.4 kg)   LMP  (LMP Unknown)   BMI 32.65 kg/m   Estimated body mass index is 32.65 kg/m  as calculated from the following:    Height as of this encounter: 5' 6.6\" (1.692 m).    Weight as of this encounter: 206 lb (93.4 kg).. Body surface area is 2.09 meters squared. BP completed using cuff size: regular  Extreme Pain (8)    Nursing Comments:     Shelia Mcdaniel CMA      I was asked by Dr. Stinson to see this patient in consultation    81F w/ lumbar scoliosis, stenosis, severe back pain.  Several years of aching, axial back pain.  Notes 7/10 throbbing back pain with radiation to the right thigh, worse with activity and walking.   Did PT and BLANCA in 2018 with excellent relief.  Scans show scoliosis with marked right-kelley curvature, stenosis, and listhesis.       Past Medical History:   Diagnosis Date     Acute cholecystitis 3/23/2018     Acute kidney injury (H) 9/10/2017     Arthritis      Dehydration 9/4/2017     Diabetes type 2, controlled (H)      Elevated lactic acid level 9/10/2017     GERD (gastroesophageal reflux disease)      History of renal calculi      HTN, goal below 140/90      Hyperlipidaemia LDL goal < 100      Hypokalemia 9/3/2017     Hypothyroid      Insomnia      Left carotid stenosis      Migraine      Motion sickness      PONV (postoperative nausea and vomiting)      Sciatica of right side 6/28/2013     Type 2 diabetes mellitus without " complication, without long-term current use of insulin (H) 2/16/2017     Past Surgical History:   Procedure Laterality Date     BUNIONECTOMY      Right foot     CATARACT IOL, RT/LT Bilateral 2017     COLONOSCOPY  7/12/2013    Procedure: COLONOSCOPY;  Colonoscopy;  Surgeon: Giovany Espinoza MD;  Location: [facility]     COLONOSCOPY N/A 1/31/2019    Procedure: Combined Colonoscopy, Single Or Multiple Biopsy/Polypectomy By Biopsy;  Surgeon: Efren Osorio MD;  Location: [facility]     COLONOSCOPY WITH CO2 INSUFFLATION N/A 1/31/2019    Procedure: COLONOSCOPY WITH CO2 INSUFFLATION;  Surgeon: Efren Osorio MD;  Location: [facility] OR     FRACTURE TX, ANKLE RT/LT      Left ankle     HC CYSTOURETHROSCOPY W/ URETEROSCOPY &/OR PYELOSCOPY; W/ LITHOTRIPSY       [facility] REVISE MEDIAN N/CARPAL TUNNEL SURG      Right wrist     INJECT EPIDURAL LUMBAR Right 12/27/2018    Procedure: INJECT EPIDURAL LUMBAR right foraminal narrowing severe at L4-L5 and moderate at L5-S1;  Surgeon: Gilbert Mcclure MD;  Location: [facility]     INJECT JOINT SACROILIAC  4/10/2014    Procedure: INJECT JOINT SACROILIAC;  Sacroiliac Joint Injection;  Surgeon: Gilbert Mcclure MD;  Location: [facility]     INJECT JOINT SACROILIAC Left 4/11/2019    Procedure: INJECT JOINT SACROILIAC- LEFT;  Surgeon: Gilbert Mcclure MD;  Location: [facility]     LAPAROSCOPIC CHOLECYSTECTOMY N/A 3/24/2018    Procedure: LAPAROSCOPIC CHOLECYSTECTOMY;  Laparoscopic Cholecystectomy;  Surgeon: Berry Allen DO;  Location: [facility] OR     Social History     Socioeconomic History     Marital status:      Spouse name: Not on file     Number of children: Not on file     Years of education: Not on file     Highest education level: Not on file   Occupational History     Employer: RETIRED     Comment: 's office part time   Social Needs     Financial resource strain: Not on file     Food insecurity     Worry: Not on file     Inability: Not on file     Transportation needs      "Medical: Not on file     Non-medical: Not on file   Tobacco Use     Smoking status: Never Smoker     Smokeless tobacco: Never Used   Substance and Sexual Activity     Alcohol use: Yes     Drug use: No     Sexual activity: Not Currently   Lifestyle     Physical activity     Days per week: Not on file     Minutes per session: Not on file     Stress: Not on file   Relationships     Social connections     Talks on phone: Not on file     Gets together: Not on file     Attends Holiness service: Not on file     Active member of club or organization: Not on file     Attends meetings of clubs or organizations: Not on file     Relationship status: Not on file     Intimate partner violence     Fear of current or ex partner: Not on file     Emotionally abused: Not on file     Physically abused: Not on file     Forced sexual activity: Not on file   Other Topics Concern     Parent/sibling w/ CABG, MI or angioplasty before 65F 55M? Not Asked   Social History Narrative     Not on file     Family History   Problem Relation Age of Onset     Hypertension Brother      Cerebrovascular Disease Brother      Diabetes Father      Cardiovascular Father      Diabetes Brother         Borderline     Breast Cancer Mother      Diabetes Brother      Blood Disease Brother      Cardiovascular Brother         MI        ROS: 10 point ROS neg other than the symptoms noted above in the HPI.    Physical Exam  /72   Temp 97.6  F (36.4  C) (Temporal)   Ht 5' 6.6\" (1.692 m)   Wt 206 lb (93.4 kg)   LMP  (LMP Unknown)   BMI 32.65 kg/m    HEENT:  Normocephalic, atraumatic.  PERRLA.  EOM s intact.  Visual fields full to gross exam  Neck:  Supple, non-tender, without lymphadenopathy.  Heart:  No peripheral edema  Lungs:  No SOB  Abdomen:  Non-distended.   Skin:  Warm and dry.  Extremities:  No edema, cyanosis or clubbing.  Psychiatric:  No apparent distress  Musculoskeletal:  Normal bulk and tone    NEUROLOGICAL EXAMINATION:     Mental status:  Alert " and Oriented x 3, speech is fluent.  Cranial nerves:  II-XII intact.   Motor:    Shoulder Abduction:  Right:  5/5   Left:  5/5  Biceps:                      Right:  5/5   Left:  5/5  Triceps:                     Right:  5/5   Left:  5/5  Wrist Extensors:       Right:  5/5   Left:  5/5  Wrist Flexors:           Right:  5/5   Left:  5/5  interosseus :            Right:  5/5   Left:  5/5  Hip Flexion:                Right: 5/5  Left:  5/5  Quadriceps:             Right:  5/5  Left:  5/5  Hamstrings:             Right:  5/5  Left:  5/5  Gastroc Soleus:        Right:  5/5  Left:  5/5  Tib/Ant:                      Right:  5/5  Left:  5/5  EHL:                     Right:  5/5  Left:  5/5  Sensation:  Intact  Reflexes:  Negative Babinski.  Negative Clonus.  Negative Clemente's.  Coordination:  Smooth finger to nose testing.   Negative pronator drift.  Smooth tandem walking.    A/P:  81F w/ lumbar scoliosis, stenosis, severe back pain    I had a discussion with the patient, reviewing the history, symptoms, and imaging  We discussed that she would not want major surgery under any circumstances  Will order PT  Will re-order BLANCA  Referral to Dr. Sheriff for management         Again, thank you for allowing me to participate in the care of your patient.        Sincerely,        Mumtaz Waite MD

## 2021-05-07 ENCOUNTER — TELEPHONE (OUTPATIENT)
Dept: SURGERY | Facility: CLINIC | Age: 81
End: 2021-05-07

## 2021-05-13 DIAGNOSIS — Z11.59 ENCOUNTER FOR SCREENING FOR OTHER VIRAL DISEASES: ICD-10-CM

## 2021-05-18 NOTE — PROGRESS NOTES
"ENT Consultation    Mya Hui who is a 81 year old female seen in consultation at the request of self.      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  It really is not pulsatile started in October 2020 without any precedent.  Is provoked by itself hairdryer cell phone and describes it as \"scratchy sounding\" sound.  It lasts for seconds less than a minute and goes away.  Does not bother her when it is quiet or unprovoked.  Denies any vertigo.          BP Readings from Last 1 Encounters:   05/06/21 128/72       BP noted to be well controlled today in office.     Mya IS NOT a smoker/uses chewing tobacco.        Past Medical History -   Past Medical History:   Diagnosis Date     Acute cholecystitis 3/23/2018     Acute kidney injury (H) 9/10/2017     Arthritis      Dehydration 9/4/2017     Diabetes type 2, controlled (H)      Elevated lactic acid level 9/10/2017     GERD (gastroesophageal reflux disease)      History of renal calculi      HTN, goal below 140/90      Hyperlipidaemia LDL goal < 100      Hypokalemia 9/3/2017     Hypothyroid      Insomnia      Left carotid stenosis      Migraine      Motion sickness      PONV (postoperative nausea and vomiting)      Sciatica of right side 6/28/2013     Type 2 diabetes mellitus without complication, without long-term current use of insulin (H) 2/16/2017       Current Medications -   Current Outpatient Medications:      blood glucose (ACCU-CHEK COMPACT PLUS) test strip, 1 strip by In Vitro route daily Use to test blood sugar 1 times daily or as directed., Disp: 100 each, Rfl: 4     blood glucose monitoring (NO BRAND SPECIFIED) meter device kit, Use to test blood sugar 1 times daily or as directed. Blood Glucose Monitor Brands: ACCU-CHEK COMPACT PLUS, Disp: 1 kit, Rfl: 0     blood glucose monitoring " (SOFTCLIX) lancets, 1 each by In Vitro route daily Use to test blood sugar 1 times daily or as directed., Disp: 100 each, Rfl: 11     cholestyramine (QUESTRAN) 4 g packet, Take 1 packet (4 g) by mouth 2 times daily (with meals), Disp: 60 each, Rfl: 11     diltiazem ER COATED BEADS (CARTIA XT) 120 MG 24 hr capsule, Take 1 capsule (120 mg) by mouth 2 times daily, Disp: 180 capsule, Rfl: 1     furosemide (LASIX) 20 MG tablet, Take 1 tablet (20 mg) by mouth daily, Disp: 90 tablet, Rfl: 3     Incontinence Supply Disposable (PROTECTIVE UNDERWEAR SUPER LG) MISC, 2 each daily, Disp: 64 each, Rfl: 11     levothyroxine (SYNTHROID/LEVOTHROID) 75 MCG tablet, Take 1 tablet (75 mcg) by mouth daily, Disp: 90 tablet, Rfl: 3     lisinopril (ZESTRIL) 40 MG tablet, Take 1 tablet (40 mg) by mouth daily, Disp: 90 tablet, Rfl: 2     Multiple Vitamins-Minerals (MULTIVITAL PO), Take  by mouth. daily, Disp: , Rfl:      order for DME, Equipment being ordered: Nike shoes and insoles, Disp: 2 Units, Rfl: 1     oxybutynin ER (DITROPAN-XL) 10 MG 24 hr tablet, Take 1 tablet (10 mg) by mouth daily, Disp: 90 tablet, Rfl: 3     pantoprazole (PROTONIX) 40 MG EC tablet, Take 1 tablet (40 mg) by mouth 2 times daily, Disp: 180 tablet, Rfl: 1     polyethylene glycol (MIRALAX) 17 g packet, Take 1 packet by mouth daily, Disp: , Rfl:      potassium chloride ER (K-TAB/KLOR-CON) 10 MEQ CR tablet, Take 1 tablet (10 mEq) by mouth 3 times daily, Disp: 180 tablet, Rfl: 3     pravastatin (PRAVACHOL) 40 MG tablet, Take 1 tablet (40 mg) by mouth daily, Disp: 90 tablet, Rfl: 3    Allergies -   Allergies   Allergen Reactions     Codeine Nausea     Fentanyl Nausea and Vomiting     VERY sensitive to most narcotics if not all.       Social History -   Social History     Socioeconomic History     Marital status:      Spouse name: Not on file     Number of children: Not on file     Years of education: Not on file     Highest education level: Not on file    Occupational History     Employer: RETIRED     Comment: 's office part time   Social Needs     Financial resource strain: Not on file     Food insecurity     Worry: Not on file     Inability: Not on file     Transportation needs     Medical: Not on file     Non-medical: Not on file   Tobacco Use     Smoking status: Never Smoker     Smokeless tobacco: Never Used   Substance and Sexual Activity     Alcohol use: Yes     Drug use: No     Sexual activity: Not Currently   Lifestyle     Physical activity     Days per week: Not on file     Minutes per session: Not on file     Stress: Not on file   Relationships     Social connections     Talks on phone: Not on file     Gets together: Not on file     Attends Faith service: Not on file     Active member of club or organization: Not on file     Attends meetings of clubs or organizations: Not on file     Relationship status: Not on file     Intimate partner violence     Fear of current or ex partner: Not on file     Emotionally abused: Not on file     Physically abused: Not on file     Forced sexual activity: Not on file   Other Topics Concern     Parent/sibling w/ CABG, MI or angioplasty before 65F 55M? Not Asked   Social History Narrative     Not on file       Family History -   Family History   Problem Relation Age of Onset     Hypertension Brother      Cerebrovascular Disease Brother      Diabetes Father      Cardiovascular Father      Diabetes Brother         Borderline     Breast Cancer Mother      Diabetes Brother      Blood Disease Brother      Cardiovascular Brother         MI       Review of Systems - As per HPI and PMHx, otherwise review of system review of the head and neck negative. Otherwise 10+ review of system is negative    Physical Exam  LMP  (LMP Unknown)   BMI: There is no height or weight on file to calculate BMI.    General - The patient is well nourished and well developed, and appears to have good nutritional status.  Alert and oriented to  person and place, answers questions and cooperates with examination appropriately.    SKIN - No suspicious lesions or rashes.  Respiration - No respiratory distress.  Head and Face - Normocephalic and atraumatic, with no gross asymmetry noted of the contour of the facial features.  The facial nerve is intact, with strong symmetric movements.    Voice and Breathing - The patient was breathing comfortably without the use of accessory muscles. The patients voice was clear and strong, and had appropriate pitch and quality.    Ears - Bilateral pinna and EACs with normal appearing overlying skin. Tympanic membrane intact with good mobility on pneumatic otoscopy bilaterally. Bony landmarks of the ossicular chain are normal. The tympanic membranes are normal in appearance. No retraction, perforation, or masses.  No fluid or purulence was seen in the external canal or the middle ear.     Eyes - Extraocular movements intact.  Sclera were not icteric or injected, conjunctiva were pink and moist.    Mouth - Examination of the oral cavity showed pink, healthy oral mucosa. No lesions or ulcerations noted.  The tongue was mobile and midline, and the dentition were in good condition.      Throat - The walls of the oropharynx were smooth, pink, moist, symmetric, and had no lesions or ulcerations.  The tonsillar pillars and soft palate were symmetric.  The uvula was midline on elevation.    Neck - Normal midline excursion of the laryngotracheal complex during swallowing.  Full range of motion on passive movement.  Palpation of the occipital, submental, submandibular, internal jugular chain, and supraclavicular nodes did not demonstrate any abnormal lymph nodes or masses.  The carotid pulse was palpable bilaterally.  Palpation of the thyroid was soft and smooth, with no nodules or goiter appreciated.  The trachea was mobile and midline.    Nose - External contour is symmetric, no gross deflection or scars.  Nasal mucosa is pink and  moist with no abnormal mucus.  The septum was midline and non-obstructive, turbinates of normal size and position.  No polyps, masses, or purulence noted on examination.    Neuro - Nonfocal neuro exam is normal, CN 2 through 12 intact, normal gait and muscle tone.      Performed in clinic today:  Audiologic Studies - An audiogram and tympanogram were performed today as part of the evaluation and personally reviewed. The tympanogram shows Type A curves on the right and Type A curves on the left, with Normal canal volumes and middle ear pressures.  The audiogram showed Mild to moderate SNHL on the right and Mild to moderate SNHLon the left.    Word recognition score 60% at 85 DB in 84% at 90 DB on the right versus 92% on the left.    A/P - Mya Hui is a 81 year old female with unusual that the tinnitus provoked by specific frequencies or sounds short lasting and the normal hearing with the some word recognition difference in the right left ear.  We discussed this asymmetry in the context of that sound.  Certainly there is a small chance of retrocochlear pathology.  We discussed possibility of getting an MRI and patient is not interested in proceeding with MRI at this point.  She will recheck the hearing in a year.  Certainly she knows that  if her sound becomes persistent on the right side she is to come back right away.      Seven Theodore MD

## 2021-05-19 ENCOUNTER — OFFICE VISIT (OUTPATIENT)
Dept: AUDIOLOGY | Facility: OTHER | Age: 81
End: 2021-05-19
Payer: COMMERCIAL

## 2021-05-19 ENCOUNTER — OFFICE VISIT (OUTPATIENT)
Dept: OTOLARYNGOLOGY | Facility: OTHER | Age: 81
End: 2021-05-19
Payer: COMMERCIAL

## 2021-05-19 VITALS
BODY MASS INDEX: 32.65 KG/M2 | SYSTOLIC BLOOD PRESSURE: 124 MMHG | DIASTOLIC BLOOD PRESSURE: 60 MMHG | TEMPERATURE: 96.9 F | WEIGHT: 206 LBS

## 2021-05-19 DIAGNOSIS — H93.11 TINNITUS OF RIGHT EAR: ICD-10-CM

## 2021-05-19 DIAGNOSIS — H90.3 SENSORINEURAL HEARING LOSS, BILATERAL: Primary | ICD-10-CM

## 2021-05-19 DIAGNOSIS — H90.3 SENSORINEURAL HEARING LOSS, BILATERAL: ICD-10-CM

## 2021-05-19 DIAGNOSIS — H93.11 TINNITUS, RIGHT: Primary | ICD-10-CM

## 2021-05-19 PROCEDURE — 99203 OFFICE O/P NEW LOW 30 MIN: CPT | Performed by: OTOLARYNGOLOGY

## 2021-05-19 PROCEDURE — 92557 COMPREHENSIVE HEARING TEST: CPT | Performed by: AUDIOLOGIST

## 2021-05-19 PROCEDURE — 92550 TYMPANOMETRY & REFLEX THRESH: CPT | Performed by: AUDIOLOGIST

## 2021-05-19 PROCEDURE — 99207 PR NO CHARGE LOS: CPT | Performed by: AUDIOLOGIST

## 2021-05-19 NOTE — LETTER
"    5/19/2021         RE: Mya Hui  [address]  [facility] [place] [id number]        Dear Colleague,    Thank you for referring your patient, Mya Hui, to the [facility]. Please see a copy of my visit note below.    ENT Consultation    Mya Hui who is a 81 year old female seen in consultation at the request of self.      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  It really is not pulsatile started in October 2020 without any precedent.  Is provoked by itself hairdryer cell phone and describes it as \"scratchy sounding\" sound.  It lasts for seconds less than a minute and goes away.  Does not bother her when it is quiet or unprovoked.  Denies any vertigo.          BP Readings from Last 1 Encounters:   05/06/21 128/72       BP noted to be well controlled today in office.     Mya IS NOT a smoker/uses chewing tobacco.        Past Medical History -   Past Medical History:   Diagnosis Date     Acute cholecystitis 3/23/2018     Acute kidney injury (H) 9/10/2017     Arthritis      Dehydration 9/4/2017     Diabetes type 2, controlled (H)      Elevated lactic acid level 9/10/2017     GERD (gastroesophageal reflux disease)      History of renal calculi      HTN, goal below 140/90      Hyperlipidaemia LDL goal < 100      Hypokalemia 9/3/2017     Hypothyroid      Insomnia      Left carotid stenosis      Migraine      Motion sickness      PONV (postoperative nausea and vomiting)      Sciatica of right side 6/28/2013     Type 2 diabetes mellitus without complication, without long-term current use of insulin (H) 2/16/2017       Current Medications -   Current Outpatient Medications:      blood glucose (ACCU-CHEK COMPACT PLUS) test strip, 1 strip by In Vitro route daily Use to test blood sugar 1 times daily or " as directed., Disp: 100 each, Rfl: 4     blood glucose monitoring (NO BRAND SPECIFIED) meter device kit, Use to test blood sugar 1 times daily or as directed. Blood Glucose Monitor Brands: ACCU-CHEK COMPACT PLUS, Disp: 1 kit, Rfl: 0     blood glucose monitoring (SOFTCLIX) lancets, 1 each by In Vitro route daily Use to test blood sugar 1 times daily or as directed., Disp: 100 each, Rfl: 11     cholestyramine (QUESTRAN) 4 g packet, Take 1 packet (4 g) by mouth 2 times daily (with meals), Disp: 60 each, Rfl: 11     diltiazem ER COATED BEADS (CARTIA XT) 120 MG 24 hr capsule, Take 1 capsule (120 mg) by mouth 2 times daily, Disp: 180 capsule, Rfl: 1     furosemide (LASIX) 20 MG tablet, Take 1 tablet (20 mg) by mouth daily, Disp: 90 tablet, Rfl: 3     Incontinence Supply Disposable (PROTECTIVE UNDERWEAR SUPER LG) MISC, 2 each daily, Disp: 64 each, Rfl: 11     levothyroxine (SYNTHROID/LEVOTHROID) 75 MCG tablet, Take 1 tablet (75 mcg) by mouth daily, Disp: 90 tablet, Rfl: 3     lisinopril (ZESTRIL) 40 MG tablet, Take 1 tablet (40 mg) by mouth daily, Disp: 90 tablet, Rfl: 2     Multiple Vitamins-Minerals (MULTIVITAL PO), Take  by mouth. daily, Disp: , Rfl:      order for DME, Equipment being ordered: Nike shoes and insoles, Disp: 2 Units, Rfl: 1     oxybutynin ER (DITROPAN-XL) 10 MG 24 hr tablet, Take 1 tablet (10 mg) by mouth daily, Disp: 90 tablet, Rfl: 3     pantoprazole (PROTONIX) 40 MG EC tablet, Take 1 tablet (40 mg) by mouth 2 times daily, Disp: 180 tablet, Rfl: 1     polyethylene glycol (MIRALAX) 17 g packet, Take 1 packet by mouth daily, Disp: , Rfl:      potassium chloride ER (K-TAB/KLOR-CON) 10 MEQ CR tablet, Take 1 tablet (10 mEq) by mouth 3 times daily, Disp: 180 tablet, Rfl: 3     pravastatin (PRAVACHOL) 40 MG tablet, Take 1 tablet (40 mg) by mouth daily, Disp: 90 tablet, Rfl: 3    Allergies -   Allergies   Allergen Reactions     Codeine Nausea     Fentanyl Nausea and Vomiting     VERY sensitive to most  narcotics if not all.       Social History -   Social History     Socioeconomic History     Marital status:      Spouse name: Not on file     Number of children: Not on file     Years of education: Not on file     Highest education level: Not on file   Occupational History     Employer: RETIRED     Comment: [employer] office part time   Social Needs     Financial resource strain: Not on file     Food insecurity     Worry: Not on file     Inability: Not on file     Transportation needs     Medical: Not on file     Non-medical: Not on file   Tobacco Use     Smoking status: Never Smoker     Smokeless tobacco: Never Used   Substance and Sexual Activity     Alcohol use: Yes     Drug use: No     Sexual activity: Not Currently   Lifestyle     Physical activity     Days per week: Not on file     Minutes per session: Not on file     Stress: Not on file   Relationships     Social connections     Talks on phone: Not on file     Gets together: Not on file     Attends Spiritism service: Not on file     Active member of club or organization: Not on file     Attends meetings of clubs or organizations: Not on file     Relationship status: Not on file     Intimate partner violence     Fear of current or ex partner: Not on file     Emotionally abused: Not on file     Physically abused: Not on file     Forced sexual activity: Not on file   Other Topics Concern     Parent/sibling w/ CABG, MI or angioplasty before 65F 55M? Not Asked   Social History Narrative     Not on file       Family History -   Family History   Problem Relation Age of Onset     Hypertension Brother      Cerebrovascular Disease Brother      Diabetes Father      Cardiovascular Father      Diabetes Brother         Borderline     Breast Cancer Mother      Diabetes Brother      Blood Disease Brother      Cardiovascular Brother         MI       Review of Systems - As per HPI and PMHx, otherwise review of system review of the head and neck negative. Otherwise 10+  review of system is negative    Physical Exam  LMP  (LMP Unknown)   BMI: There is no height or weight on file to calculate BMI.    General - The patient is well nourished and well developed, and appears to have good nutritional status.  Alert and oriented to person and place, answers questions and cooperates with examination appropriately.    SKIN - No suspicious lesions or rashes.  Respiration - No respiratory distress.  Head and Face - Normocephalic and atraumatic, with no gross asymmetry noted of the contour of the facial features.  The facial nerve is intact, with strong symmetric movements.    Voice and Breathing - The patient was breathing comfortably without the use of accessory muscles. The patients voice was clear and strong, and had appropriate pitch and quality.    Ears - Bilateral pinna and EACs with normal appearing overlying skin. Tympanic membrane intact with good mobility on pneumatic otoscopy bilaterally. Bony landmarks of the ossicular chain are normal. The tympanic membranes are normal in appearance. No retraction, perforation, or masses.  No fluid or purulence was seen in the external canal or the middle ear.     Eyes - Extraocular movements intact.  Sclera were not icteric or injected, conjunctiva were pink and moist.    Mouth - Examination of the oral cavity showed pink, healthy oral mucosa. No lesions or ulcerations noted.  The tongue was mobile and midline, and the dentition were in good condition.      Throat - The walls of the oropharynx were smooth, pink, moist, symmetric, and had no lesions or ulcerations.  The tonsillar pillars and soft palate were symmetric.  The uvula was midline on elevation.    Neck - Normal midline excursion of the laryngotracheal complex during swallowing.  Full range of motion on passive movement.  Palpation of the occipital, submental, submandibular, internal jugular chain, and supraclavicular nodes did not demonstrate any abnormal lymph nodes or masses.  The  carotid pulse was palpable bilaterally.  Palpation of the thyroid was soft and smooth, with no nodules or goiter appreciated.  The trachea was mobile and midline.    Nose - External contour is symmetric, no gross deflection or scars.  Nasal mucosa is pink and moist with no abnormal mucus.  The septum was midline and non-obstructive, turbinates of normal size and position.  No polyps, masses, or purulence noted on examination.    Neuro - Nonfocal neuro exam is normal, CN 2 through 12 intact, normal gait and muscle tone.      Performed in clinic today:  Audiologic Studies - An audiogram and tympanogram were performed today as part of the evaluation and personally reviewed. The tympanogram shows Type A curves on the right and Type A curves on the left, with Normal canal volumes and middle ear pressures.  The audiogram showed Mild to moderate SNHL on the right and Mild to moderate SNHLon the left.    Word recognition score 60% at 85 DB in 84% at 90 DB on the right versus 92% on the left.    A/P - Mya Hui is a 81 year old female with unusual that the tinnitus provoked by specific frequencies or sounds short lasting and the normal hearing with the some word recognition difference in the right left ear.  We discussed this asymmetry in the context of that sound.  Certainly there is a small chance of retrocochlear pathology.  We discussed possibility of getting an MRI and patient is not interested in proceeding with MRI at this point.  She will recheck the hearing in a year.  Certainly she knows that  if her sound becomes persistent on the right side she is to come back right away.      Seven Theodore MD      Again, thank you for allowing me to participate in the care of your patient.        Sincerely,        Seven Theodore MD, MD

## 2021-05-19 NOTE — PROGRESS NOTES
AUDIOLOGY REPORT:    Patient was referred from ENT by Dr. Theodore for audiology evaluation. The patient reports tinnitus in the right ear only that started in September or October of last year. She notes that certain sounds make it worse, like her hair dryer or cell phone. It was initially more constant and is now intermittent. 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 She wears [employer] V30-M behind the ear hearing aids with slim tubes and custom earmolds. She reports that they were fit at [facility]. The serial numbers indicate a likely 2015 fitting, though the patient notes that she may have worn hearing aids longer than that. The patient reports that she has not had any major changes in hearing recently. She reports that her father had hearing loss. The patient reports that she does not have ear pain, pressure, or a history of ear problems or ear surgery.    Testing:    Otoscopy:   Otoscopic exam indicates ears are clear of cerumen bilaterally     Tympanograms:    RIGHT: normal eardrum mobility     LEFT:   normal eardrum mobility    Reflexes (reported by stimulus ear):  RIGHT: Ipsilateral is present at elevated levels   RIGHT: Contralateral is present at normal levels  LEFT:   Ipsilateral is present at normal levels  LEFT:   Contralateral is present at normal levels    Thresholds:   Pure Tone Thresholds assessed using conventional audiometry with good reliability from 250-8000 Hz bilaterally using insert earphones and circumaural headphones     RIGHT:  mild to moderate sensorineural hearing loss    LEFT:    mild to moderately severe sensorineural hearing loss    Speech Reception Threshold:    RIGHT: 45 dB HL    LEFT:   45 dB HL  Results are in agreement with pure tone average.     Word Recognition Score:     RIGHT: 60% at 85 dB HL using NU-6 recorded word list.      84% at 90 dB HL using NU-6 recorded word list.    LEFT:   92% at 85 dB HL  using NU-6 recorded word list.    Discussed results with the patient.     Patient was returned to ENT for follow up.     Corbin Mooney, CCC-A  Licensed Audiologist #[id number]  5/19/2021

## 2021-05-22 NOTE — PATIENT INSTRUCTIONS
Take your Diltiazem and Levothyroxine the morning of your procedure.     Preparing for Your Surgery  Getting started  A nurse will call you to review your health history and instructions. They will give you an arrival time based on your scheduled surgery time.  Please be ready to share the following:    Your doctor's clinic name and phone number    Your medical, surgical and anesthesia history    A list of allergies and sensitivities    A list of medicines, including herbal treatments and over-the-counter drugs    Whether the patient has a legal guardian (ask how to send us the papers in advance)  If you have a child who's having surgery, please ask for a copy of Preparing for Your Child's Surgery.    Preparing for surgery    Within 30 days of surgery: Have a pre-op exam (sometimes called an H&P, or History and Physical). This can be done at a clinic or pre-operative center.  ? If you're having a , you may not need this exam. Talk to your care team    At your pre-op exam, talk to your care team about all medicines you take. If you need to stop any medicines before surgery, ask when to start taking them again.  ? We do this for your safety. Many medicines can make you bleed too much during surgery. Some change how well surgery (anesthesia) drugs work.    Call your insurance company to let them know you're having surgery. (If you don't have insurance, call [phone].)    Call your clinic if there's any change in your health. This includes signs of a cold or flu (sore throat, runny nose, cough, rash, fever). It also includes a scrape or scratch near the surgery site.    If you have questions on the day of surgery, call your hospital or surgery center.  Eating and drinking guidelines  For your safety: Unless your surgeon tells you otherwise, follow the guidelines below.    Eat and drink as usual until 8 hours before surgery. After that, no food or milk.    Drink clear liquids until 2 hours before surgery.  These are liquids you can see through, like water, Gatorade and Propel Water. You may also have black coffee and tea (no cream or milk).    Nothing by mouth within 2 hours of surgery. This includes gum, candy and breath mints.    If you drink, stop drinking alcohol the night before surgery.    If your care team tells you to take medicine on the morning of surgery, it's okay to take it with a sip of water.  Preventing infection    Shower or bathe the night before and morning of your surgery. Follow the instructions your clinic gave you. (If no instructions, use regular soap.)    Don't shave or clip hair near your surgery site. We'll remove the hair if needed.    Don't smoke or vape the morning of surgery. You may chew nicotine gum up to 2 hours before surgery. A nicotine patch is okay.  ? Note: Some surgeries require you to completely quit smoking and nicotine. Check with your surgeon.    Your care team will make every effort to keep you safe from infection. We will:  ? Clean our hands often with soap and water (or an alcohol-based hand rub).  ? Clean the skin at your surgery site with a special soap that kills germs.  ? Give you a special gown to keep you warm. (Cold raises the risk of infection.)  ? Wear special hair covers, masks, gowns and gloves during surgery.  ? Give antibiotic medicine, if prescribed. Not all surgeries need antibiotics.  What to bring on the day of surgery    Photo ID and insurance card    Copy of your health care directive, if you have one    Glasses and hearing aides (bring cases)  ? You can't wear contacts during surgery    Inhaler and eye drops, if you use them (tell us about these when you arrive)    CPAP machine or breathing device, if you use them    A few personal items, if spending the night    If you have . . .  ? A pacemaker or ICD (cardiac defibrillator): Bring the ID card.  ? An implanted stimulator: Bring the remote control.  ? A legal guardian: Bring a copy of the certified  (court-stamped) guardianship papers.  Please remove any jewelry, including body piercings. Leave jewelry and other valuables at home.  If you're going home the day of surgery  Important: If you don't follow the rules below, we must cancel your surgery.     Arrange for someone to drive you home after surgery. You may not drive, take a taxi or take public transportation by yourself (unless you'll have local anesthesia only).    Arrange for a responsible adult to stay with you overnight. If you don't, we may keep you in the hospital overnight, and you may need to pay the costs yourself.  Questions?   If you have any questions for your care team, list them here: _________________________________________________________________________________________________________________________________________________________________________________________________________________________________________________________________________________________________________________________  For informational purposes only. Not to replace the advice of your health care provider. Copyright   2003, 2019 [employer]. All rights reserved. Clinically reviewed by Liliane Galicia MD. [employer] [id number] - REV 4/20.

## 2021-05-22 NOTE — PROGRESS NOTES
[address]  [facility] [address]  Phone: [phone]  Primary Provider: Kisha Stinson  Pre-op Performing Provider: JOHN MERRITT    PREOPERATIVE EVALUATION:  Today's date: 5/25/2021    Mya Hui is a 81 year old female who presents for a preoperative evaluation.    Surgical Information:  Surgery/Procedure: Right Lumbar 4-5 and Lumbar 5-sacral 1 Epidural steroid injection  Surgery Location: [facility]  Surgeon: Dr. Mcclure  Surgery Date: 5/27/2021  Time of Surgery: 2PM  Where patient plans to recover: At home with family  Fax number for surgical facility: Note does not need to be faxed, will be available electronically in Epic.    Type of Anesthesia Anticipated: MAC    Assessment & Plan     The proposed surgical procedure is considered INTERMEDIATE risk.    Preop general physical exam    Chronic right-sided low back pain without sciatica    HTN, goal below 150/90  - EKG 12-lead complete w/read - Clinics    Diabetes mellitus with peripheral vascular disease (H)  - EKG 12-lead complete w/read - Clinics    Essential hypertension with goal blood pressure less than 140/90  - EKG 12-lead complete w/read - Clinics    Stage 3a chronic kidney disease  - EKG 12-lead complete w/read - Clinics    Hyperlipidemia with target LDL less than 100  - EKG 12-lead complete w/read - Clinics    Hypothyroidism, unspecified type  - EKG 12-lead complete w/read - Clinics    Asymptomatic carotid artery stenosis, unspecified laterality  - EKG 12-lead complete w/read - Clinics           Risks and Recommendations:  The patient has the following additional risks and recommendations for perioperative complications:   - No identified additional risk factors other than previously addressed    PLEASE NOTE PATIENT'S ALLERGY LIST    Medication Instructions:  Patient was instructed to hold her medications morning of procedure, recommended she continue on her Diltiazem and  Levothyroxine morning of procedure.     RECOMMENDATION:  APPROVAL GIVEN to proceed with proposed procedure, without further diagnostic evaluation.    Subjective     HPI related to upcoming procedure: Has had issues with chronic back pain, she has undergone injections in the past.  Recently she thinks in the last year with COVID and being less active her back pain has gotten worse.       Preop Questions 5/25/2021   1. Have you ever had a heart attack or stroke? No   2. Have you ever had surgery on your heart or blood vessels, such as a stent placement, a coronary artery bypass, or surgery on an artery in your head, neck, heart, or legs? No   3. Do you have chest pain with activity? No   4. Do you have a history of  heart failure? No   5. Do you currently have a cold, bronchitis or symptoms of other infection? No   6. Do you have a cough, shortness of breath, or wheezing? No   7. Do you or anyone in your family have previous history of blood clots? YES - DAUGHTER   8. Do you or does anyone in your family have a serious bleeding problem such as prolonged bleeding following surgeries or cuts? No   9. Have you ever had problems with anemia or been told to take iron pills? No   10. Have you had any abnormal blood loss such as black, tarry or bloody stools, or abnormal vaginal bleeding? No   11. Have you ever had a blood transfusion? No   12. Are you willing to have a blood transfusion if it is medically needed before, during, or after your surgery? Yes   13. Have you or any of your relatives ever had problems with anesthesia? YES - CERTAIN MEDICATION when she underwent her colonoscopy at [place] AND IT CAUSED PROBLEMS. But has not had any problems with the medications for past injections.    14. Do you have sleep apnea, excessive snoring or daytime drowsiness? YES    14a. Do you have a CPAP machine? No   15. Do you have any artifical heart valves or other implanted medical devices like a pacemaker, defibrillator, or  continuous glucose monitor? No   16. Do you have artificial joints? No   17. Are you allergic to latex? No     Health Care Directive:  Patient has a Health Care Directive on file      Preoperative Review of :   reviewed - no record of controlled substances prescribed.      Status of Chronic Conditions:  DIABETES - Patient has a longstanding history of DiabetesType Type II . Patient is being treated with diet and denies significant side effects. Control has been good. Complicating factors include but are not limited to: hypertension, hyperlipidemia and chronic kidney disease.     HYPERLIPIDEMIA - Patient has a long history of significant Hyperlipidemia requiring medication for treatment with recent good control. Patient reports no problems or side effects with the medication.     HYPERTENSION - Patient has longstanding history of HTN , currently denies any symptoms referable to elevated blood pressure. Specifically denies chest pain, palpitations, dyspnea, orthopnea, PND or peripheral edema. Blood pressure readings have been in normal range. Current medication regimen is as listed below. Patient denies any side effects of medication.     HYPOTHYROIDISM - Patient has a longstanding history of chronic Hypothyroidism. Patient has been doing well, noting no tremor, insomnia, hair loss or changes in skin texture. Continues to take medications as directed, without adverse reactions or side effects. Last TSH   Lab Results   Component Value Date    TSH 3.46 11/10/2020   .        Review of Systems  Constitutional, neuro, ENT, endocrine, pulmonary, cardiac, gastrointestinal, genitourinary, musculoskeletal, integument and psychiatric systems are negative, except as otherwise noted.    Patient Active Problem List    Diagnosis Date Noted     Impacted cerumen of right ear 01/22/2021     Priority: Medium     Balance problems 01/22/2021     Priority: Medium     Tinnitus, right 01/22/2021     Priority: Medium     Sacroiliitis,  not elsewhere classified (H) 01/06/2021     Priority: Medium     Chronic venous insufficiency 11/30/2020     Priority: Medium     Diabetes mellitus with peripheral vascular disease (H) 01/20/2020     Priority: Medium     CKD (chronic kidney disease) stage 3, GFR 30-59 ml/min 01/20/2020     Priority: Medium     Bilateral lower extremity edema 09/24/2019     Priority: Medium     Urinary incontinence, unspecified type 07/17/2019     Priority: Medium     S/P laparoscopic cholecystectomy 04/03/2018     Priority: Medium     Toe anomaly 04/03/2018     Priority: Medium     Cherry angioma 04/03/2018     Priority: Medium     Gastroesophageal reflux disease, esophagitis presence not specified 09/14/2017     Priority: Medium     IMO Regulatory Load OCT 2020       Sliding hiatal hernia 09/11/2017     Priority: Medium     Calculus of gallbladder without cholecystitis 09/11/2017     Priority: Medium     Rotator cuff arthropathy, right 08/28/2017     Priority: Medium     Chronic right shoulder pain 08/10/2017     Priority: Medium     Right shoulder pain, unspecified chronicity 08/10/2017     Priority: Medium     Dry mouth 08/10/2017     Priority: Medium     Primary osteoarthritis involving multiple joints 06/16/2017     Priority: Medium     Cataract, bilateral 03/30/2017     Priority: Medium     Type 2 diabetes mellitus without complication, without long-term current use of insulin (H) 02/16/2017     Priority: Medium     Hypothyroidism, unspecified type 02/14/2017     Priority: Medium     Insomnia, unspecified 02/14/2017     Priority: Medium     OAB (overactive bladder) 09/07/2016     Priority: Medium     Essential hypertension with goal blood pressure less than 140/90 09/07/2016     Priority: Medium     Osteoarthritis of hip 08/21/2014     Priority: Medium     Do you wish to do the replacement in the background? yes         DDD (degenerative disc disease), lumbar 08/21/2014     Priority: Medium     Hearing aid worn 05/30/2014      Priority: Medium     Right ear       Hip pain 09/27/2013     Priority: Medium     Sciatica of right side 06/28/2013     Priority: Medium     Asymptomatic carotid artery stenosis 05/02/2013     Priority: Medium     Hyperlipidemia with target LDL less than 100      Priority: Medium     Diagnosis updated by automated process. Provider to review and confirm.       History of renal calculi      Priority: Medium     Nevus 04/10/2013     Priority: Medium     Do you wish to do the replacement in the background? yes         Arthritis      Priority: Medium      Past Medical History:   Diagnosis Date     Acute cholecystitis 3/23/2018     Acute kidney injury (H) 9/10/2017     Arthritis      Dehydration 9/4/2017     Diabetes type 2, controlled (H)      Elevated lactic acid level 9/10/2017     GERD (gastroesophageal reflux disease)      History of renal calculi      HTN, goal below 140/90      Hyperlipidaemia LDL goal < 100      Hypokalemia 9/3/2017     Hypothyroid      Insomnia      Left carotid stenosis      Migraine      Motion sickness      PONV (postoperative nausea and vomiting)      Sciatica of right side 6/28/2013     Type 2 diabetes mellitus without complication, without long-term current use of insulin (H) 2/16/2017     Past Surgical History:   Procedure Laterality Date     BUNIONECTOMY      Right foot     CATARACT IOL, RT/LT Bilateral 2017     COLONOSCOPY  7/12/2013    Procedure: COLONOSCOPY;  Colonoscopy;  Surgeon: Giovany Espinoza MD;  Location: [facility]     COLONOSCOPY N/A 1/31/2019    Procedure: Combined Colonoscopy, Single Or Multiple Biopsy/Polypectomy By Biopsy;  Surgeon: Efren Osorio MD;  Location: [facility]     COLONOSCOPY WITH CO2 INSUFFLATION N/A 1/31/2019    Procedure: COLONOSCOPY WITH CO2 INSUFFLATION;  Surgeon: Efren Osorio MD;  Location: [facility]     FRACTURE TX, ANKLE RT/LT      Left ankle     HC CYSTOURETHROSCOPY W/ URETEROSCOPY &/OR PYELOSCOPY; W/ LITHOTRIPSY       HC REVISE  MEDIAN N/CARPAL TUNNEL SURG      Right wrist     INJECT EPIDURAL LUMBAR Right 12/27/2018    Procedure: INJECT EPIDURAL LUMBAR right foraminal narrowing severe at L4-L5 and moderate at L5-S1;  Surgeon: Glibert Mcclure MD;  Location: [facility]     INJECT JOINT SACROILIAC  4/10/2014    Procedure: INJECT JOINT SACROILIAC;  Sacroiliac Joint Injection;  Surgeon: Gilbert Mcclure MD;  Location: [facility]     INJECT JOINT SACROILIAC Left 4/11/2019    Procedure: INJECT JOINT SACROILIAC- LEFT;  Surgeon: Gilbert Mcclure MD;  Location: [facility]     LAPAROSCOPIC CHOLECYSTECTOMY N/A 3/24/2018    Procedure: LAPAROSCOPIC CHOLECYSTECTOMY;  Laparoscopic Cholecystectomy;  Surgeon: Berry Allen DO;  Location: [facility]     Current Outpatient Medications   Medication Sig Dispense Refill     blood glucose (ACCU-CHEK COMPACT PLUS) test strip 1 strip by In Vitro route daily Use to test blood sugar 1 times daily or as directed. 100 each 4     blood glucose monitoring (NO BRAND SPECIFIED) meter device kit Use to test blood sugar 1 times daily or as directed. Blood Glucose Monitor Brands: ACCU-CHEK COMPACT PLUS 1 kit 0     blood glucose monitoring (SOFTCLIX) lancets 1 each by In Vitro route daily Use to test blood sugar 1 times daily or as directed. 100 each 11     cholestyramine (QUESTRAN) 4 g packet Take 1 packet (4 g) by mouth 2 times daily (with meals) 60 each 11     diltiazem ER COATED BEADS (CARTIA XT) 120 MG 24 hr capsule Take 1 capsule (120 mg) by mouth 2 times daily 180 capsule 1     furosemide (LASIX) 20 MG tablet Take 1 tablet (20 mg) by mouth daily 90 tablet 3     Incontinence Supply Disposable (PROTECTIVE UNDERWEAR SUPER LG) MISC 2 each daily 64 each 11     levothyroxine (SYNTHROID/LEVOTHROID) 75 MCG tablet Take 1 tablet (75 mcg) by mouth daily 90 tablet 3     lisinopril (ZESTRIL) 40 MG tablet Take 1 tablet (40 mg) by mouth daily 90 tablet 2     Multiple Vitamins-Minerals (MULTIVITAL PO) Take  by mouth. daily       order for DME  "Equipment being ordered: [employer] shoes and insoles 2 Units 1     oxybutynin ER (DITROPAN-XL) 10 MG 24 hr tablet Take 1 tablet (10 mg) by mouth daily 90 tablet 3     pantoprazole (PROTONIX) 40 MG EC tablet Take 1 tablet (40 mg) by mouth 2 times daily 180 tablet 1     polyethylene glycol (MIRALAX) 17 g packet Take 1 packet by mouth daily       potassium chloride ER (K-TAB/KLOR-CON) 10 MEQ CR tablet Take 1 tablet (10 mEq) by mouth 3 times daily 180 tablet 3     pravastatin (PRAVACHOL) 40 MG tablet Take 1 tablet (40 mg) by mouth daily 90 tablet 3       Allergies   Allergen Reactions     Codeine Nausea     Fentanyl Nausea and Vomiting     VERY sensitive to most narcotics if not all.        Social History     Tobacco Use     Smoking status: Never Smoker     Smokeless tobacco: Never Used   Substance Use Topics     Alcohol use: Yes     Family History   Problem Relation Age of Onset     Hypertension Brother      Cerebrovascular Disease Brother      Diabetes Father      Cardiovascular Father      Diabetes Brother         Borderline     Breast Cancer Mother      Diabetes Brother      Blood Disease Brother      Cardiovascular Brother         MI     History   Drug Use No         Objective     /62   Pulse 62   Temp 98.3  F (36.8  C) (Temporal)   Resp 16   Ht 1.65 m (5' 4.96\")   Wt 93.4 kg (206 lb)   LMP  (LMP Unknown)   SpO2 94%   BMI 34.32 kg/m      Physical Exam    GENERAL APPEARANCE: healthy, alert and no distress     EYES: EOMI, PERRL     HENT: ear canals and TM's normal and nose and mouth without ulcers or lesions     NECK: no adenopathy, no asymmetry, masses, or scars and thyroid normal to palpation     RESP: lungs clear to auscultation - no rales, rhonchi or wheezes     CV: regular rates and rhythm, normal S1 S2, no S3 or S4 and no murmur, click or rub     ABDOMEN:  soft, nontender, no HSM or masses and bowel sounds normal     MS: extremities normal- no gross deformities noted, no evidence of inflammation in " joints, FROM in all extremities.     SKIN: no suspicious lesions or rashes     NEURO: Normal strength and tone, sensory exam grossly normal, mentation intact and speech normal     PSYCH: mentation appears normal. and affect normal/bright     LYMPHATICS: No cervical adenopathy    Recent Labs   Lab Test 01/06/21  1143 12/15/20  1248 09/23/20  1347 09/23/20  1347   HGB  --   --   --  13.1   PLT  --   --   --  173    143   < > 143   POTASSIUM 4.3 4.0   < > 4.1   CR 1.04 0.97   < > 0.98   A1C  --  6.2*  --  6.1*    < > = values in this interval not displayed.        Diagnostics:  Component      Latest Ref Rng & Units 12/15/2020 1/6/2021   Sodium      133 - 144 mmol/L 143 144   Potassium      3.4 - 5.3 mmol/L 4.0 4.3   Chloride      94 - 109 mmol/L 110 (H) 112 (H)   Carbon Dioxide      20 - 32 mmol/L 29 30   Anion Gap      3 - 14 mmol/L 4 2 (L)   Glucose      70 - 99 mg/dL 120 (H) 89   Urea Nitrogen      7 - 30 mg/dL 17 21   Creatinine      0.52 - 1.04 mg/dL 0.97 1.04   GFR Estimate      >60 mL/min/1.73:m2 56 (L) 51 (L)   GFR Estimate If Black      >60 mL/min/1.73:m2 65 59 (L)   Calcium      8.5 - 10.1 mg/dL 9.3 9.2   Bilirubin Total      0.2 - 1.3 mg/dL  0.4   Albumin      3.4 - 5.0 g/dL  3.8   Protein Total      6.8 - 8.8 g/dL  7.0   Alkaline Phosphatase      40 - 150 U/L  71   ALT      0 - 50 U/L  37   AST      0 - 45 U/L  24   Cholesterol      <200 mg/dL  162   Triglycerides      <150 mg/dL  248 (H)   HDL Cholesterol      >49 mg/dL  41 (L)   LDL Cholesterol Calculated      <100 mg/dL  71   Non HDL Cholesterol      <130 mg/dL  121   Creatinine Urine      mg/dL  383   Albumin Urine mg/L      mg/L  24   Albumin Urine mg/g Cr      0 - 25 mg/g Cr  6.29   Hemoglobin A1C      0 - 5.6 % 6.2 (H)      EKG: sinus bradycardia, nonspecific T wave abnormalit, unchanged from previous tracings    Revised Cardiac Risk Index (RCRI):  The patient has the following serious cardiovascular risks for perioperative complications:   -  No serious cardiac risks = 0 points     RCRI Interpretation: 0 points: Class I (very low risk - 0.4% complication rate)           Signed Electronically by: Ray Castrejon PA-C  Copy of this evaluation report is provided to requesting physician.

## 2021-05-22 NOTE — H&P (VIEW-ONLY)
[address]  [facility] [address]  Phone: [phone]  Primary Provider: Kisha Stinson  Pre-op Performing Provider: JOHN MERRITT    PREOPERATIVE EVALUATION:  Today's date: 5/25/2021    Mya Hui is a 81 year old female who presents for a preoperative evaluation.    Surgical Information:  Surgery/Procedure: Right Lumbar 4-5 and Lumbar 5-sacral 1 Epidural steroid injection  Surgery Location: [facility]  Surgeon: Dr. Mcclure  Surgery Date: 5/27/2021  Time of Surgery: 2PM  Where patient plans to recover: At home with family  Fax number for surgical facility: Note does not need to be faxed, will be available electronically in Epic.    Type of Anesthesia Anticipated: MAC    Assessment & Plan     The proposed surgical procedure is considered INTERMEDIATE risk.    Preop general physical exam    Chronic right-sided low back pain without sciatica    HTN, goal below 150/90  - EKG 12-lead complete w/read - Clinics    Diabetes mellitus with peripheral vascular disease (H)  - EKG 12-lead complete w/read - Clinics    Essential hypertension with goal blood pressure less than 140/90  - EKG 12-lead complete w/read - Clinics    Stage 3a chronic kidney disease  - EKG 12-lead complete w/read - Clinics    Hyperlipidemia with target LDL less than 100  - EKG 12-lead complete w/read - Clinics    Hypothyroidism, unspecified type  - EKG 12-lead complete w/read - Clinics    Asymptomatic carotid artery stenosis, unspecified laterality  - EKG 12-lead complete w/read - Clinics           Risks and Recommendations:  The patient has the following additional risks and recommendations for perioperative complications:   - No identified additional risk factors other than previously addressed    PLEASE NOTE PATIENT'S ALLERGY LIST    Medication Instructions:  Patient was instructed to hold her medications morning of procedure, recommended she continue on her Diltiazem and  Levothyroxine morning of procedure.     RECOMMENDATION:  APPROVAL GIVEN to proceed with proposed procedure, without further diagnostic evaluation.    Subjective     HPI related to upcoming procedure: Has had issues with chronic back pain, she has undergone injections in the past.  Recently she thinks in the last year with COVID and being less active her back pain has gotten worse.       Preop Questions 5/25/2021   1. Have you ever had a heart attack or stroke? No   2. Have you ever had surgery on your heart or blood vessels, such as a stent placement, a coronary artery bypass, or surgery on an artery in your head, neck, heart, or legs? No   3. Do you have chest pain with activity? No   4. Do you have a history of  heart failure? No   5. Do you currently have a cold, bronchitis or symptoms of other infection? No   6. Do you have a cough, shortness of breath, or wheezing? No   7. Do you or anyone in your family have previous history of blood clots? YES - DAUGHTER   8. Do you or does anyone in your family have a serious bleeding problem such as prolonged bleeding following surgeries or cuts? No   9. Have you ever had problems with anemia or been told to take iron pills? No   10. Have you had any abnormal blood loss such as black, tarry or bloody stools, or abnormal vaginal bleeding? No   11. Have you ever had a blood transfusion? No   12. Are you willing to have a blood transfusion if it is medically needed before, during, or after your surgery? Yes   13. Have you or any of your relatives ever had problems with anesthesia? YES - CERTAIN MEDICATION when she underwent her colonoscopy at [facility] AND IT CAUSED PROBLEMS. But has not had any problems with the medications for past injections.    14. Do you have sleep apnea, excessive snoring or daytime drowsiness? YES    14a. Do you have a CPAP machine? No   15. Do you have any artifical heart valves or other implanted medical devices like a pacemaker, defibrillator, or  continuous glucose monitor? No   16. Do you have artificial joints? No   17. Are you allergic to latex? No     Health Care Directive:  Patient has a Health Care Directive on file      Preoperative Review of :   reviewed - no record of controlled substances prescribed.      Status of Chronic Conditions:  DIABETES - Patient has a longstanding history of DiabetesType Type II . Patient is being treated with diet and denies significant side effects. Control has been good. Complicating factors include but are not limited to: hypertension, hyperlipidemia and chronic kidney disease.     HYPERLIPIDEMIA - Patient has a long history of significant Hyperlipidemia requiring medication for treatment with recent good control. Patient reports no problems or side effects with the medication.     HYPERTENSION - Patient has longstanding history of HTN , currently denies any symptoms referable to elevated blood pressure. Specifically denies chest pain, palpitations, dyspnea, orthopnea, PND or peripheral edema. Blood pressure readings have been in normal range. Current medication regimen is as listed below. Patient denies any side effects of medication.     HYPOTHYROIDISM - Patient has a longstanding history of chronic Hypothyroidism. Patient has been doing well, noting no tremor, insomnia, hair loss or changes in skin texture. Continues to take medications as directed, without adverse reactions or side effects. Last TSH   Lab Results   Component Value Date    TSH 3.46 11/10/2020   .        Review of Systems  Constitutional, neuro, ENT, endocrine, pulmonary, cardiac, gastrointestinal, genitourinary, musculoskeletal, integument and psychiatric systems are negative, except as otherwise noted.    Patient Active Problem List    Diagnosis Date Noted     Impacted cerumen of right ear 01/22/2021     Priority: Medium     Balance problems 01/22/2021     Priority: Medium     Tinnitus, right 01/22/2021     Priority: Medium     Sacroiliitis,  not elsewhere classified (H) 01/06/2021     Priority: Medium     Chronic venous insufficiency 11/30/2020     Priority: Medium     Diabetes mellitus with peripheral vascular disease (H) 01/20/2020     Priority: Medium     CKD (chronic kidney disease) stage 3, GFR 30-59 ml/min 01/20/2020     Priority: Medium     Bilateral lower extremity edema 09/24/2019     Priority: Medium     Urinary incontinence, unspecified type 07/17/2019     Priority: Medium     S/P laparoscopic cholecystectomy 04/03/2018     Priority: Medium     Toe anomaly 04/03/2018     Priority: Medium     Cherry angioma 04/03/2018     Priority: Medium     Gastroesophageal reflux disease, esophagitis presence not specified 09/14/2017     Priority: Medium     IMO Regulatory Load OCT 2020       Sliding hiatal hernia 09/11/2017     Priority: Medium     Calculus of gallbladder without cholecystitis 09/11/2017     Priority: Medium     Rotator cuff arthropathy, right 08/28/2017     Priority: Medium     Chronic right shoulder pain 08/10/2017     Priority: Medium     Right shoulder pain, unspecified chronicity 08/10/2017     Priority: Medium     Dry mouth 08/10/2017     Priority: Medium     Primary osteoarthritis involving multiple joints 06/16/2017     Priority: Medium     Cataract, bilateral 03/30/2017     Priority: Medium     Type 2 diabetes mellitus without complication, without long-term current use of insulin (H) 02/16/2017     Priority: Medium     Hypothyroidism, unspecified type 02/14/2017     Priority: Medium     Insomnia, unspecified 02/14/2017     Priority: Medium     OAB (overactive bladder) 09/07/2016     Priority: Medium     Essential hypertension with goal blood pressure less than 140/90 09/07/2016     Priority: Medium     Osteoarthritis of hip 08/21/2014     Priority: Medium     Do you wish to do the replacement in the background? yes         DDD (degenerative disc disease), lumbar 08/21/2014     Priority: Medium     Hearing aid worn 05/30/2014      Priority: Medium     Right ear       Hip pain 09/27/2013     Priority: Medium     Sciatica of right side 06/28/2013     Priority: Medium     Asymptomatic carotid artery stenosis 05/02/2013     Priority: Medium     Hyperlipidemia with target LDL less than 100      Priority: Medium     Diagnosis updated by automated process. Provider to review and confirm.       History of renal calculi      Priority: Medium     Nevus 04/10/2013     Priority: Medium     Do you wish to do the replacement in the background? yes         Arthritis      Priority: Medium      Past Medical History:   Diagnosis Date     Acute cholecystitis 3/23/2018     Acute kidney injury (H) 9/10/2017     Arthritis      Dehydration 9/4/2017     Diabetes type 2, controlled (H)      Elevated lactic acid level 9/10/2017     GERD (gastroesophageal reflux disease)      History of renal calculi      HTN, goal below 140/90      Hyperlipidaemia LDL goal < 100      Hypokalemia 9/3/2017     Hypothyroid      Insomnia      Left carotid stenosis      Migraine      Motion sickness      PONV (postoperative nausea and vomiting)      Sciatica of right side 6/28/2013     Type 2 diabetes mellitus without complication, without long-term current use of insulin (H) 2/16/2017     Past Surgical History:   Procedure Laterality Date     BUNIONECTOMY      Right foot     CATARACT IOL, RT/LT Bilateral 2017     COLONOSCOPY  7/12/2013    Procedure: COLONOSCOPY;  Colonoscopy;  Surgeon: Giovany Espinoza MD;  Location: [facility]     COLONOSCOPY N/A 1/31/2019    Procedure: Combined Colonoscopy, Single Or Multiple Biopsy/Polypectomy By Biopsy;  Surgeon: Efren Osorio MD;  Location: [facility]     COLONOSCOPY WITH CO2 INSUFFLATION N/A 1/31/2019    Procedure: COLONOSCOPY WITH CO2 INSUFFLATION;  Surgeon: Efren Osorio MD;  Location: [facility]     FRACTURE TX, ANKLE RT/LT      Left ankle     HC CYSTOURETHROSCOPY W/ URETEROSCOPY &/OR PYELOSCOPY; W/ LITHOTRIPSY       HC REVISE  MEDIAN N/CARPAL TUNNEL SURG      Right wrist     INJECT EPIDURAL LUMBAR Right 12/27/2018    Procedure: INJECT EPIDURAL LUMBAR right foraminal narrowing severe at L4-L5 and moderate at L5-S1;  Surgeon: Gilbert Mcclure MD;  Location[facility]     INJECT JOINT SACROILIAC  4/10/2014    Procedure: INJECT JOINT SACROILIAC;  Sacroiliac Joint Injection;  Surgeon: Gilbert Mcclure MD;  [facility]     INJECT JOINT SACROILIAC Left 4/11/2019    Procedure: INJECT JOINT SACROILIAC- LEFT;  Surgeon: Gilbert Mcclure MD;  [facility]     LAPAROSCOPIC CHOLECYSTECTOMY N/A 3/24/2018    Procedure: LAPAROSCOPIC CHOLECYSTECTOMY;  Laparoscopic Cholecystectomy;  Surgeon: Berry Allen DO;  Location: [facility]     Current Outpatient Medications   Medication Sig Dispense Refill     blood glucose (ACCU-CHEK COMPACT PLUS) test strip 1 strip by In Vitro route daily Use to test blood sugar 1 times daily or as directed. 100 each 4     blood glucose monitoring (NO BRAND SPECIFIED) meter device kit Use to test blood sugar 1 times daily or as directed. Blood Glucose Monitor Brands: ACCU-CHEK COMPACT PLUS 1 kit 0     blood glucose monitoring (SOFTCLIX) lancets 1 each by In Vitro route daily Use to test blood sugar 1 times daily or as directed. 100 each 11     cholestyramine (QUESTRAN) 4 g packet Take 1 packet (4 g) by mouth 2 times daily (with meals) 60 each 11     diltiazem ER COATED BEADS (CARTIA XT) 120 MG 24 hr capsule Take 1 capsule (120 mg) by mouth 2 times daily 180 capsule 1     furosemide (LASIX) 20 MG tablet Take 1 tablet (20 mg) by mouth daily 90 tablet 3     Incontinence Supply Disposable (PROTECTIVE UNDERWEAR SUPER LG) MISC 2 each daily 64 each 11     levothyroxine (SYNTHROID/LEVOTHROID) 75 MCG tablet Take 1 tablet (75 mcg) by mouth daily 90 tablet 3     lisinopril (ZESTRIL) 40 MG tablet Take 1 tablet (40 mg) by mouth daily 90 tablet 2     Multiple Vitamins-Minerals (MULTIVITAL PO) Take  by mouth. daily       order for DME  "Equipment being ordered: Nike shoes and insoles 2 Units 1     oxybutynin ER (DITROPAN-XL) 10 MG 24 hr tablet Take 1 tablet (10 mg) by mouth daily 90 tablet 3     pantoprazole (PROTONIX) 40 MG EC tablet Take 1 tablet (40 mg) by mouth 2 times daily 180 tablet 1     polyethylene glycol (MIRALAX) 17 g packet Take 1 packet by mouth daily       potassium chloride ER (K-TAB/KLOR-CON) 10 MEQ CR tablet Take 1 tablet (10 mEq) by mouth 3 times daily 180 tablet 3     pravastatin (PRAVACHOL) 40 MG tablet Take 1 tablet (40 mg) by mouth daily 90 tablet 3       Allergies   Allergen Reactions     Codeine Nausea     Fentanyl Nausea and Vomiting     VERY sensitive to most narcotics if not all.        Social History     Tobacco Use     Smoking status: Never Smoker     Smokeless tobacco: Never Used   Substance Use Topics     Alcohol use: Yes     Family History   Problem Relation Age of Onset     Hypertension Brother      Cerebrovascular Disease Brother      Diabetes Father      Cardiovascular Father      Diabetes Brother         Borderline     Breast Cancer Mother      Diabetes Brother      Blood Disease Brother      Cardiovascular Brother         MI     History   Drug Use No         Objective     /62   Pulse 62   Temp 98.3  F (36.8  C) (Temporal)   Resp 16   Ht 1.65 m (5' 4.96\")   Wt 93.4 kg (206 lb)   LMP  (LMP Unknown)   SpO2 94%   BMI 34.32 kg/m      Physical Exam    GENERAL APPEARANCE: healthy, alert and no distress     EYES: EOMI, PERRL     HENT: ear canals and TM's normal and nose and mouth without ulcers or lesions     NECK: no adenopathy, no asymmetry, masses, or scars and thyroid normal to palpation     RESP: lungs clear to auscultation - no rales, rhonchi or wheezes     CV: regular rates and rhythm, normal S1 S2, no S3 or S4 and no murmur, click or rub     ABDOMEN:  soft, nontender, no HSM or masses and bowel sounds normal     MS: extremities normal- no gross deformities noted, no evidence of inflammation in " joints, FROM in all extremities.     SKIN: no suspicious lesions or rashes     NEURO: Normal strength and tone, sensory exam grossly normal, mentation intact and speech normal     PSYCH: mentation appears normal. and affect normal/bright     LYMPHATICS: No cervical adenopathy    Recent Labs   Lab Test 01/06/21  1143 12/15/20  1248 09/23/20  1347 09/23/20  1347   HGB  --   --   --  13.1   PLT  --   --   --  173    143   < > 143   POTASSIUM 4.3 4.0   < > 4.1   CR 1.04 0.97   < > 0.98   A1C  --  6.2*  --  6.1*    < > = values in this interval not displayed.        Diagnostics:  Component      Latest Ref Rng & Units 12/15/2020 1/6/2021   Sodium      133 - 144 mmol/L 143 144   Potassium      3.4 - 5.3 mmol/L 4.0 4.3   Chloride      94 - 109 mmol/L 110 (H) 112 (H)   Carbon Dioxide      20 - 32 mmol/L 29 30   Anion Gap      3 - 14 mmol/L 4 2 (L)   Glucose      70 - 99 mg/dL 120 (H) 89   Urea Nitrogen      7 - 30 mg/dL 17 21   Creatinine      0.52 - 1.04 mg/dL 0.97 1.04   GFR Estimate      >60 mL/min/1.73:m2 56 (L) 51 (L)   GFR Estimate If Black      >60 mL/min/1.73:m2 65 59 (L)   Calcium      8.5 - 10.1 mg/dL 9.3 9.2   Bilirubin Total      0.2 - 1.3 mg/dL  0.4   Albumin      3.4 - 5.0 g/dL  3.8   Protein Total      6.8 - 8.8 g/dL  7.0   Alkaline Phosphatase      40 - 150 U/L  71   ALT      0 - 50 U/L  37   AST      0 - 45 U/L  24   Cholesterol      <200 mg/dL  162   Triglycerides      <150 mg/dL  248 (H)   HDL Cholesterol      >49 mg/dL  41 (L)   LDL Cholesterol Calculated      <100 mg/dL  71   Non HDL Cholesterol      <130 mg/dL  121   Creatinine Urine      mg/dL  383   Albumin Urine mg/L      mg/L  24   Albumin Urine mg/g Cr      0 - 25 mg/g Cr  6.29   Hemoglobin A1C      0 - 5.6 % 6.2 (H)      EKG: sinus bradycardia, nonspecific T wave abnormalit, unchanged from previous tracings    Revised Cardiac Risk Index (RCRI):  The patient has the following serious cardiovascular risks for perioperative complications:   -  No serious cardiac risks = 0 points     RCRI Interpretation: 0 points: Class I (very low risk - 0.4% complication rate)           Signed Electronically by: Ray Castrejon PA-C  Copy of this evaluation report is provided to requesting physician.

## 2021-05-25 ENCOUNTER — OFFICE VISIT (OUTPATIENT)
Dept: FAMILY MEDICINE | Facility: OTHER | Age: 81
End: 2021-05-25
Payer: COMMERCIAL

## 2021-05-25 VITALS
WEIGHT: 206 LBS | HEIGHT: 65 IN | HEART RATE: 62 BPM | RESPIRATION RATE: 16 BRPM | TEMPERATURE: 98.3 F | DIASTOLIC BLOOD PRESSURE: 62 MMHG | BODY MASS INDEX: 34.32 KG/M2 | SYSTOLIC BLOOD PRESSURE: 126 MMHG | OXYGEN SATURATION: 94 %

## 2021-05-25 DIAGNOSIS — I65.29 ASYMPTOMATIC CAROTID ARTERY STENOSIS, UNSPECIFIED LATERALITY: ICD-10-CM

## 2021-05-25 DIAGNOSIS — G89.29 CHRONIC RIGHT-SIDED LOW BACK PAIN WITHOUT SCIATICA: ICD-10-CM

## 2021-05-25 DIAGNOSIS — E03.9 HYPOTHYROIDISM, UNSPECIFIED TYPE: ICD-10-CM

## 2021-05-25 DIAGNOSIS — E78.5 HYPERLIPIDEMIA WITH TARGET LDL LESS THAN 100: ICD-10-CM

## 2021-05-25 DIAGNOSIS — Z01.818 PREOP GENERAL PHYSICAL EXAM: Primary | ICD-10-CM

## 2021-05-25 DIAGNOSIS — N18.31 STAGE 3A CHRONIC KIDNEY DISEASE (H): ICD-10-CM

## 2021-05-25 DIAGNOSIS — E11.51 DIABETES MELLITUS WITH PERIPHERAL VASCULAR DISEASE (H): ICD-10-CM

## 2021-05-25 DIAGNOSIS — M54.50 CHRONIC RIGHT-SIDED LOW BACK PAIN WITHOUT SCIATICA: ICD-10-CM

## 2021-05-25 DIAGNOSIS — I10 HTN, GOAL BELOW 150/90: ICD-10-CM

## 2021-05-25 DIAGNOSIS — Z11.59 ENCOUNTER FOR SCREENING FOR OTHER VIRAL DISEASES: ICD-10-CM

## 2021-05-25 DIAGNOSIS — I10 ESSENTIAL HYPERTENSION WITH GOAL BLOOD PRESSURE LESS THAN 140/90: ICD-10-CM

## 2021-05-25 LAB
SARS-COV-2 RNA RESP QL NAA+PROBE: NORMAL
SPECIMEN SOURCE: NORMAL

## 2021-05-25 PROCEDURE — 99214 OFFICE O/P EST MOD 30 MIN: CPT | Performed by: PHYSICIAN ASSISTANT

## 2021-05-25 PROCEDURE — 93000 ELECTROCARDIOGRAM COMPLETE: CPT | Performed by: PHYSICIAN ASSISTANT

## 2021-05-25 PROCEDURE — U0005 INFEC AGEN DETEC AMPLI PROBE: HCPCS | Performed by: ANESTHESIOLOGY

## 2021-05-25 PROCEDURE — U0003 INFECTIOUS AGENT DETECTION BY NUCLEIC ACID (DNA OR RNA); SEVERE ACUTE RESPIRATORY SYNDROME CORONAVIRUS 2 (SARS-COV-2) (CORONAVIRUS DISEASE [COVID-19]), AMPLIFIED PROBE TECHNIQUE, MAKING USE OF HIGH THROUGHPUT TECHNOLOGIES AS DESCRIBED BY CMS-2020-01-R: HCPCS | Performed by: ANESTHESIOLOGY

## 2021-05-25 ASSESSMENT — MIFFLIN-ST. JEOR: SCORE: 1409.67

## 2021-05-26 LAB
LABORATORY COMMENT REPORT: NORMAL
SARS-COV-2 RNA RESP QL NAA+PROBE: NEGATIVE
SPECIMEN SOURCE: NORMAL

## 2021-05-27 ENCOUNTER — HOSPITAL ENCOUNTER (OUTPATIENT)
Facility: CLINIC | Age: 81
Discharge: HOME | End: 2021-05-27
Attending: ANESTHESIOLOGY | Admitting: ANESTHESIOLOGY
Payer: COMMERCIAL

## 2021-05-27 ENCOUNTER — ANESTHESIA (OUTPATIENT)
Dept: SURGERY | Facility: CLINIC | Age: 81
End: 2021-05-27
Payer: COMMERCIAL

## 2021-05-27 ENCOUNTER — ANESTHESIA EVENT (OUTPATIENT)
Dept: SURGERY | Facility: CLINIC | Age: 81
End: 2021-05-27
Payer: COMMERCIAL

## 2021-05-27 ENCOUNTER — HOSPITAL ENCOUNTER (OUTPATIENT)
Dept: GENERAL RADIOLOGY | Facility: CLINIC | Age: 81
End: 2021-05-27
Attending: ANESTHESIOLOGY
Payer: COMMERCIAL

## 2021-05-27 VITALS
TEMPERATURE: 98.3 F | RESPIRATION RATE: 16 BRPM | BODY MASS INDEX: 34.32 KG/M2 | WEIGHT: 206 LBS | HEART RATE: 62 BPM | DIASTOLIC BLOOD PRESSURE: 89 MMHG | OXYGEN SATURATION: 99 % | HEIGHT: 65 IN | SYSTOLIC BLOOD PRESSURE: 163 MMHG

## 2021-05-27 DIAGNOSIS — M51.16 INTERVERTEBRAL DISC DISORDERS WITH RADICULOPATHY, LUMBAR REGION: ICD-10-CM

## 2021-05-27 DIAGNOSIS — M41.9 SCOLIOSIS OF LUMBAR SPINE, UNSPECIFIED SCOLIOSIS TYPE: ICD-10-CM

## 2021-05-27 LAB — GLUCOSE BLDC GLUCOMTR-MCNC: 104 MG/DL (ref 70–99)

## 2021-05-27 PROCEDURE — 82962 GLUCOSE BLOOD TEST: CPT

## 2021-05-27 PROCEDURE — 250N000009 HC RX 250: Performed by: NURSE ANESTHETIST, CERTIFIED REGISTERED

## 2021-05-27 PROCEDURE — 250N000011 HC RX IP 250 OP 636: Performed by: ANESTHESIOLOGY

## 2021-05-27 PROCEDURE — 370N000017 HC ANESTHESIA TECHNICAL FEE, PER MIN: Performed by: ANESTHESIOLOGY

## 2021-05-27 PROCEDURE — 250N000011 HC RX IP 250 OP 636: Performed by: NURSE ANESTHETIST, CERTIFIED REGISTERED

## 2021-05-27 PROCEDURE — 999N000179 XR SURGERY CARM FLUORO LESS THAN 5 MIN W STILLS: Mod: TC

## 2021-05-27 PROCEDURE — 64483 NJX AA&/STRD TFRM EPI L/S 1: CPT | Mod: 50 | Performed by: ANESTHESIOLOGY

## 2021-05-27 RX ORDER — METHYLPREDNISOLONE ACETATE 40 MG/ML
INJECTION, SUSPENSION INTRA-ARTICULAR; INTRALESIONAL; INTRAMUSCULAR; SOFT TISSUE PRN
Status: DISCONTINUED | OUTPATIENT
Start: 2021-05-27 | End: 2021-05-27 | Stop reason: HOSPADM

## 2021-05-27 RX ORDER — IOPAMIDOL 612 MG/ML
INJECTION, SOLUTION INTRATHECAL PRN
Status: DISCONTINUED | OUTPATIENT
Start: 2021-05-27 | End: 2021-05-27 | Stop reason: HOSPADM

## 2021-05-27 RX ORDER — PROPOFOL 10 MG/ML
INJECTION, EMULSION INTRAVENOUS PRN
Status: DISCONTINUED | OUTPATIENT
Start: 2021-05-27 | End: 2021-05-27

## 2021-05-27 RX ORDER — LIDOCAINE HYDROCHLORIDE 20 MG/ML
INJECTION, SOLUTION INFILTRATION; PERINEURAL PRN
Status: DISCONTINUED | OUTPATIENT
Start: 2021-05-27 | End: 2021-05-27

## 2021-05-27 RX ADMIN — PROPOFOL 50 MG: 10 INJECTION, EMULSION INTRAVENOUS at 13:40

## 2021-05-27 RX ADMIN — PROPOFOL 20 MG: 10 INJECTION, EMULSION INTRAVENOUS at 13:42

## 2021-05-27 RX ADMIN — PROPOFOL 20 MG: 10 INJECTION, EMULSION INTRAVENOUS at 13:44

## 2021-05-27 RX ADMIN — LIDOCAINE HYDROCHLORIDE 1 ML: 10 INJECTION, SOLUTION EPIDURAL; INFILTRATION; INTRACAUDAL; PERINEURAL at 13:16

## 2021-05-27 RX ADMIN — LIDOCAINE HYDROCHLORIDE 40 MG: 20 INJECTION, SOLUTION INFILTRATION; PERINEURAL at 13:40

## 2021-05-27 ASSESSMENT — MIFFLIN-ST. JEOR: SCORE: 1409.65

## 2021-05-27 NOTE — ANESTHESIA PREPROCEDURE EVALUATION
Anesthesia Pre-Procedure Evaluation    Patient: Mya Hui   MRN: [id number] : 1940        Preoperative Diagnosis: Scoliosis of lumbar spine, unspecified scoliosis type [M41.9]  Intervertebral disc disorder with radiculopathy of lumbar region [M51.16]   Procedure : Procedure(s):  Right Lumbar 4-5 and Lumbar 5-sacral 1 Epidural steroid injection     Past Medical History:   Diagnosis Date     Acute cholecystitis 3/23/2018     Acute kidney injury (H) 9/10/2017     Arthritis      Dehydration 2017     Diabetes type 2, controlled (H)      Elevated lactic acid level 9/10/2017     GERD (gastroesophageal reflux disease)      History of renal calculi      HTN, goal below 140/90      Hyperlipidaemia LDL goal < 100      Hypokalemia 9/3/2017     Hypothyroid      Insomnia      Left carotid stenosis      Migraine      Motion sickness      PONV (postoperative nausea and vomiting)      Sciatica of right side 2013     Type 2 diabetes mellitus without complication, without long-term current use of insulin (H) 2017      Past Surgical History:   Procedure Laterality Date     BUNIONECTOMY      Right foot     CATARACT IOL, RT/LT Bilateral 2017     COLONOSCOPY  2013    Procedure: COLONOSCOPY;  Colonoscopy;  Surgeon: Giovany Espinoza MD;  [facility]     COLONOSCOPY N/A 2019    Procedure: Combined Colonoscopy, Single Or Multiple Biopsy/Polypectomy By Biopsy;  Surgeon: Efren Osorio MD;  [facility]     COLONOSCOPY WITH CO2 INSUFFLATION N/A 2019    Procedure: COLONOSCOPY WITH CO2 INSUFFLATION;  Surgeon: Efren Osorio MD;  [facility]     FRACTURE TX, ANKLE RT/LT      Left ankle     HC CYSTOURETHROSCOPY W/ URETEROSCOPY &/OR PYELOSCOPY; W/ LITHOTRIPSY       HC REVISE MEDIAN N/CARPAL TUNNEL SURG      Right wrist     INJECT EPIDURAL LUMBAR Right 2018    Procedure: INJECT EPIDURAL LUMBAR right foraminal narrowing severe at L4-L5 and moderate at L5-S1;   Surgeon: Gilbert Mcclure MD;  [facility]     INJECT JOINT SACROILIAC  4/10/2014    Procedure: INJECT JOINT SACROILIAC;  Sacroiliac Joint Injection;  Surgeon: Gilbert Mcclure MD;  [facility]     INJECT JOINT SACROILIAC Left 4/11/2019    Procedure: INJECT JOINT SACROILIAC- LEFT;  Surgeon: Gilbert Mcclure MD;  [facility]     LAPAROSCOPIC CHOLECYSTECTOMY N/A 3/24/2018    Procedure: LAPAROSCOPIC CHOLECYSTECTOMY;  Laparoscopic Cholecystectomy;  Surgeon: Berry Allen DO;  [facility]      Allergies   Allergen Reactions     Codeine Nausea     Fentanyl Nausea and Vomiting     VERY sensitive to most narcotics if not all.      Social History     Tobacco Use     Smoking status: Never Smoker     Smokeless tobacco: Never Used   Substance Use Topics     Alcohol use: Yes      Wt Readings from Last 1 Encounters:   05/25/21 93.4 kg (206 lb)        Anesthesia Evaluation   Pt has had prior anesthetic. Type: General and MAC.    History of anesthetic complications  - PONV.      ROS/MED HX  ENT/Pulmonary:  - neg pulmonary ROS     Neurologic:     (+) migraines,     Cardiovascular: Comment: Asymptomatic carotid artery stenosis      11/12/20     The nuclear stress test is negative for inducible myocardial ischemia or infarction.     Left ventricular function is normal.     The left ventricular ejection fraction at stress is 67%.     There is no prior study for comparison.    (+) Dyslipidemia hypertension-----Previous cardiac testing   Echo: Date: Results:    Stress Test: Date: Results:    ECG Reviewed: Date: 5/25/21 Results:  Sinus bradycardia, nonspecific T wave abnormalit, unchanged from previous tracings  Cath: Date: Results:      METS/Exercise Tolerance:     Hematologic:  - neg hematologic  ROS     Musculoskeletal: Comment: DDD  (+) arthritis,     GI/Hepatic:     (+) GERD, Asymptomatic on medication,     Renal/Genitourinary:     (+) renal disease, type: CRI,     Endo:     (+) type II DM, Last HgA1c: 6.2,  date: 12/15/20, thyroid problem, hypothyroidism,     Psychiatric/Substance Use:  - neg psychiatric ROS     Infectious Disease:  - neg infectious disease ROS     Malignancy:  - neg malignancy ROS     Other:  - neg other ROS          Physical Exam    Airway  airway exam normal      Mallampati: II   TM distance: > 3 FB   Neck ROM: full   Mouth opening: > 3 cm    Respiratory Devices and Support         Dental  no notable dental history         Cardiovascular   cardiovascular exam normal       Rhythm and rate: regular and normal     Pulmonary   pulmonary exam normal        breath sounds clear to auscultation           OUTSIDE LABS:  CBC:   Lab Results   Component Value Date    WBC 6.5 09/23/2020    WBC 8.1 03/23/2018    HGB 13.1 09/23/2020    HGB 13.3 03/23/2018    HCT 40.2 09/23/2020    HCT 41.0 03/23/2018     09/23/2020     03/23/2018     BMP:   Lab Results   Component Value Date     01/06/2021     12/15/2020    POTASSIUM 4.3 01/06/2021    POTASSIUM 4.0 12/15/2020    CHLORIDE 112 (H) 01/06/2021    CHLORIDE 110 (H) 12/15/2020    CO2 30 01/06/2021    CO2 29 12/15/2020    BUN 21 01/06/2021    BUN 17 12/15/2020    CR 1.04 01/06/2021    CR 0.97 12/15/2020    GLC 89 01/06/2021     (H) 12/15/2020     COAGS: No results found for: PTT, INR, FIBR  POC:   Lab Results   Component Value Date     (H) 04/11/2019     HEPATIC:   Lab Results   Component Value Date    ALBUMIN 3.8 01/06/2021    PROTTOTAL 7.0 01/06/2021    ALT 37 01/06/2021    AST 24 01/06/2021    ALKPHOS 71 01/06/2021    BILITOTAL 0.4 01/06/2021     OTHER:   Lab Results   Component Value Date    LACT 1.0 09/10/2017    A1C 6.2 (H) 12/15/2020    TERESA 9.2 01/06/2021    PHOS 3.3 09/03/2017    MAG 2.2 09/05/2017    LIPASE 124 03/23/2018    TSH 3.46 11/10/2020    T4 0.95 03/08/2019    CRP <2.9 11/27/2018    SED 18 09/18/2017       Anesthesia Plan    ASA Status:  2   NPO Status:  NPO Appropriate    Anesthesia Type: MAC.     - Reason for  MAC: immobility needed, straight local not clinically adequate   Induction: Intravenous.   Maintenance: TIVA.        Consents    Anesthesia Plan(s) and associated risks, benefits, and realistic alternatives discussed. Questions answered and patient/representative(s) expressed understanding.     - Discussed with:  Patient      - Extended Intubation/Ventilatory Support Discussed: No.      - Patient is DNR/DNI Status: No    Use of blood products discussed: Yes.     - Discussed with: Patient.     - Consented: consented to blood products            Reason for refusal: other.     Postoperative Care    Pain management: IV analgesics, Oral pain medications, Multi-modal analgesia.   PONV prophylaxis: Background Propofol Infusion     Comments:    The risks and benefits of anesthesia, and the alternatives where applicable, have been discussed with the patient, and they wish to proceed.              Abby Saucedo, APRN CRNA

## 2021-05-27 NOTE — ANESTHESIA CARE TRANSFER NOTE
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    Anesthesia Type:   MAC     Note:    Oropharynx: oropharynx clear of all foreign objects and spontaneously breathing  Level of Consciousness: drowsy  Oxygen Supplementation: face mask    Independent Airway: airway patency satisfactory and stable  Dentition: dentition unchanged  Vital Signs Stable: post-procedure vital signs reviewed and stable  Report to RN Given: handoff report given  Patient transferred to: Phase II    Handoff Report: Identifed the Patient, Identified the Reponsible Provider, Reviewed the pertinent medical history, Discussed the surgical course, Reviewed Intra-OP anesthesia mangement and issues during anesthesia, Set expectations for post-procedure period and Allowed opportunity for questions and acknowledgement of understanding      Vitals: (Last set prior to Anesthesia Care Transfer)  CRNA VITALS  5/27/2021 1323 - 5/27/2021 1356      5/27/2021             Resp Rate (observed):  25        Electronically Signed By: MAYI Butler CRNA  May 27, 2021  1:56 PM

## 2021-05-27 NOTE — DISCHARGE INSTRUCTIONS
Home Care Instructions                Procedure: Epidural injection or joint injection    Activity:    Rest today    Do not work today    Resume normal activity tomorrow    Pain:    You may experience soreness at the injection site for 1 to 3 days.    You may use an ice pack for 20 minutes every 2 hours for the first 24 hours    You may use a heating pad after the first 24 hours    You may use Tylenol  (acetaminophen) every 4 hours or other pain medicines as directed by your physician    Safety  Sedation medicine, if given may remain active for many hours.    It is important for the next 24 hours that you do not:    Drive a car    Operate machines or power tools    Consume alcohol, including beer    Sign any important papers or legal documents    You may experience numbness radiating into your legs or arms, (depending on the procedure location)  This numbness may last several hours.  Until the numb sensation returns to normal please use caution in walking, climbing stairs, stepping out of your vehicle, etc.    Common side effects of steroids:  Not everyone will experience corticosteroid side effects. If side effects are experienced they will gradually subside in the 7-10 day period following an injection.    Most common side effects include:    Flushed face and/or chest    Feeling of warmth, particularly in face but could be overall feeling of warmth    Increased blood sugar in diabetic patients    Menstrual irregularities may occur.  If taking hormone based birth control an alternate method of birth control is recommended    Sleep disturbances and/or mood swings are possible    Leg cramps    Please contact us if you have:  Severe pain   Fever more than 101.5 degrees Fahrenheit  Signs of infection (redness, swelling or drainage)      If you have questions during normal business hours (8am-5pm Monday-Friday) contact the [facility] at [phone]. If you need help after hours, we recommend that you go to a  hospital emergency room or dial 911.

## 2021-05-27 NOTE — ANESTHESIA POSTPROCEDURE EVALUATION
Patient: Mya Hui    Procedure(s):  Bilateral Lumbar 5-sacral 1 Epidural steroid injection    Diagnosis:Scoliosis of lumbar spine, unspecified scoliosis type [M41.9]  Intervertebral disc disorder with radiculopathy of lumbar region [M51.16]  Diagnosis Additional Information: No value filed.    Anesthesia Type:  MAC    Note:  Disposition: Outpatient   Postop Pain Control: Uneventful            Sign Out: Well controlled pain   PONV: No   Neuro/Psych: Uneventful            Sign Out: Acceptable/Baseline neuro status   Airway/Respiratory: Uneventful            Sign Out: Acceptable/Baseline resp. status   CV/Hemodynamics: Uneventful            Sign Out: Acceptable CV status   Other NRE: NONE   DID A NON-ROUTINE EVENT OCCUR? No    Event details/Postop Comments:  Pt was happy with anesthesia care.  No complications.  I will follow up with the pt if needed.           Last vitals:  Vitals:    05/27/21 1400 05/27/21 1410 05/27/21 1420   BP: (!) 155/87  (!) 163/89   Pulse: 60  62   Resp: 16  16   Temp:      SpO2: 93% 95% 99%       Last vitals prior to Anesthesia Care Transfer:  CRNA VITALS  5/27/2021 1323 - 5/27/2021 1422      5/27/2021             Resp Rate (observed):  25          Electronically Signed By: MAYI Butler CRNA  May 27, 2021  2:22 PM

## 2021-05-27 NOTE — OP NOTE
PRIMARY PROBLEM: Low back pain and bilateral leg pains    PROCEDURE: Bilateral L5-S1  Transforaminal Epidural Steroid Injections with fluoroscopic guidance and contrast.     PROCEDURE DETAILS: After written informed consent was obtained from the patient, the patient was escorted to the procedure room.  The patient was placed in the prone position.  A  time out  was conducted to verify patient identity, procedure to be performed, side, site, allergies and any special requirements.  The skin over the thoracolumbar region was prepped and draped in normal sterile fashion. Fluoroscopy was used to identify the neural foramen in AP view and the skin was anesthetized with 2 mL of 1% lidocaine with bicarbonate buffer. A 22-gauge Quincke spinal needle was advanced through this location and advanced under fluoroscopic guidance towards the neural foramen.  The target zone was the 6 o clock position of the pedicle.   Prior to entering the foramen, the depth of the needle was gauged with a lateral view on fluoroscopy. While still in a lateral view, the needle was slowly advanced to avoid injury to the spinal nerve.  Then, in the oblique view (approximately 28 degrees), after negative aspiration, 1.5 mL of Omnipaque contrast dye was injected revealing epidural spread without evidence of intravascular or intrathecal spread.  Then a 2.75cc solution of 10 mg of Medrol in 2 mL of  Preservative-Free saline was slowly injected into the epidural space at each segment.  There is good coverage at the L5-S1 segment but not a lot of spread into the L3-4 and L4-5 location centrally.  Therefore a separate Touhy needle was placed the L5-S1 paramedian interspace into her epidural space with loss-of-resistance with saline.  An epidurogram was confirmed at this level showing proper spread into the epidural space with no evidence of any intravascular, intrathecal, intraneural.  At this level I injected another 20 mg of Depo-Medrol with 4 cc of  preservative-free normal saline.  There is good washout from L3 down to S1.  After injection of the medication, as the needle tip was withdrawn, it was flushed with local anesthetic.   The patient was monitored with blood pressure and pulse oximetry machines with the assistance of an RN throughout the procedure.  The patient was alert and responsive to questions throughout the procedure.   The patient tolerated the procedure well and was observed in the post-procedural area.  The patient was dismissed without apparent complications.     BLOOD LOSS: < 5 cc    DIAGNOSIS:  1.  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 Performed bilateral L5-S1  transforaminal epidural steroid injections.  2. The patient was instructed to follow-up per Dr. Mcclure's instructions.  I am really hoping this helps her since I do not think she is a good candidate for surgery given her age and the nature of her degeneration in her back.  If this injection does help her then she can periodically do this 3 or 4 times a year to keep her relatively comfortable.    Gilbert Mcclure MD  Diplomate of the American Board of Anesthesiology, Pain Medicine

## 2021-06-07 ENCOUNTER — HOSPITAL ENCOUNTER (OUTPATIENT)
Dept: NUTRITION | Facility: CLINIC | Age: 81
Discharge: HOME | End: 2021-06-07
Attending: PHYSICIAN ASSISTANT | Admitting: PHYSICIAN ASSISTANT
Payer: COMMERCIAL

## 2021-06-07 PROCEDURE — 97802 MEDICAL NUTRITION INDIV IN: CPT | Mod: TEL,GZ | Performed by: DIETITIAN, REGISTERED

## 2021-06-07 NOTE — PROGRESS NOTES
"[facility]  Medical Nutrition Therapy    Visit Type: Initial Assessment    Mya Hui, 81 year old referred by Kisha Stinson PA-C for MNT related to weight loss (obesity) and diabetes    The patient has been notified of following:      \"This telephone visit will be conducted via a call between you and your physician/provider. We have found that certain health care needs can be provided without the need for a physical exam.  This service lets us provide the care you need with a short phone conversation.     Telephone visits are billed at different rates depending on your insurance coverage. During this emergency period, for some insurers they may be billed the same as an in-person visit.  Please reach out to your insurance provider with any questions.     If during the course of the call the physician/provider feels a telephone visit is not appropriate, you will not be charged for this service.\"     Patient has given verbal consent for Telephone visit?  Yes     What phone number would you like to be contacted at? [phone]     Phone call duration: 60 min    Nutrition Assessment:  Anthropometrics  Height:   Ht Readings from Last 1 Encounters:   05/27/21 1.65 m (5' 4.96\")         BMI:  34.3   Weight Status:  Obesity Grade I BMI 30-34.9   Weight:   Wt Readings from Last 1 Encounters:   05/27/21 93.4 kg (206 lb)             IBW:  120 lb (55 kg) IBW %: 172%        Weight History:   Wt Readings from Last 10 Encounters:   05/27/21 93.4 kg (206 lb)   05/25/21 93.4 kg (206 lb)   05/19/21 93.4 kg (206 lb)   05/06/21 93.4 kg (206 lb)   04/26/21 93.8 kg (206 lb 12.8 oz)   01/22/21 94.3 kg (208 lb)   01/06/21 94.2 kg (207 lb 9.6 oz)   11/10/20 93.9 kg (207 lb)   05/13/20 91.6 kg (202 lb)   03/10/20 93 kg (205 lb)   -Stable wt over the past 1 year      Nutrition History    PMH:   Patient Active Problem List   Diagnosis     Arthritis     Nevus     Asymptomatic carotid artery stenosis     " Hyperlipidemia with target LDL less than 100     History of renal calculi     Sciatica of right side     Hip pain     Hearing aid worn     Osteoarthritis of hip     DDD (degenerative disc disease), lumbar     OAB (overactive bladder)     Essential hypertension with goal blood pressure less than 140/90     Hypothyroidism, unspecified type     Insomnia, unspecified     Type 2 diabetes mellitus without complication, without long-term current use of insulin (H)     Cataract, bilateral     Primary osteoarthritis involving multiple joints     Chronic right shoulder pain     Right shoulder pain, unspecified chronicity     Dry mouth     Rotator cuff arthropathy, right     Sliding hiatal hernia     Calculus of gallbladder without cholecystitis     Gastroesophageal reflux disease, esophagitis presence not specified     S/P laparoscopic cholecystectomy     Toe anomaly     Cherry angioma     Urinary incontinence, unspecified type     Bilateral lower extremity edema     Diabetes mellitus with peripheral vascular disease (H)     CKD (chronic kidney disease) stage 3, GFR 30-59 ml/min     Chronic venous insufficiency     Sacroiliitis, not elsewhere classified (H)     Impacted cerumen of right ear     Balance problems     Tinnitus, right     Migraine without aura and without status migrainosus, not intractable     Gastroesophageal reflux disease without esophagitis     Essential hypertension     Hyperlipidemia     Hypothyroidism     Diabetes mellitus with stage 3 chronic kidney disease (H)     -Looking for help with diarrhea management. Not having any diarrhea at this time. Takes meds for this.     Labs:   Hemoglobin A1C   Date Value Ref Range Status   12/15/2020 6.2 (H) 0 - 5.6 % Final     Comment:     Normal <5.7% Prediabetes 5.7-6.4%  Diabetes 6.5% or higher - adopted from ADA   consensus guidelines.       Cholesterol   Date Value Ref Range Status   01/06/2021 162 <200 mg/dL Final     LDL Cholesterol Calculated   Date Value Ref  Range Status   01/06/2021 71 <100 mg/dL Final     Comment:     Desirable:       <100 mg/dl     HDL Cholesterol   Date Value Ref Range Status   01/06/2021 41 (L) >49 mg/dL Final     Triglycerides   Date Value Ref Range Status   01/06/2021 248 (H) <150 mg/dL Final     Comment:     Borderline high:  150-199 mg/dl  High:             200-499 mg/dl  Very high:       >499 mg/dl       Meds:   Current Outpatient Medications   Medication Instructions     blood glucose (ACCU-CHEK COMPACT PLUS) test strip 1 strip, In Vitro, DAILY, Use to test blood sugar 1 times daily or as directed.     blood glucose monitoring (NO BRAND SPECIFIED) meter device kit Use to test blood sugar 1 times daily or as directed. Blood Glucose Monitor Brands: ACCU-CHEK COMPACT PLUS     blood glucose monitoring (SOFTCLIX) lancets 1 each, In Vitro, DAILY, Use to test blood sugar 1 times daily or as directed.     cholestyramine (QUESTRAN) 4 g, Oral, 2 TIMES DAILY WITH MEALS     diltiazem ER COATED BEADS (CARTIA XT) 120 mg, Oral, 2 TIMES DAILY     furosemide (LASIX) 20 mg, Oral, DAILY     Incontinence Supply Disposable (PROTECTIVE UNDERWEAR SUPER LG) MISC 2 each, Does not apply, DAILY     levothyroxine (SYNTHROID/LEVOTHROID) 75 mcg, Oral, DAILY     lisinopril (ZESTRIL) 40 mg, Oral, DAILY     Multiple Vitamins-Minerals (MULTIVITAL PO) Take  by mouth. daily     order for DME Equipment being ordered: [employer] shoes and insoles     oxybutynin ER (DITROPAN-XL) 10 mg, Oral, DAILY     pantoprazole (PROTONIX) 40 mg, Oral, 2 TIMES DAILY     polyethylene glycol (MIRALAX) 17 g packet 1 packet, Oral, DAILY     potassium chloride ER (K-TAB/KLOR-CON) 10 MEQ CR tablet 10 mEq, Oral, 3 TIMES DAILY     pravastatin (PRAVACHOL) 40 mg, Oral, DAILY     Supplements: reviewed      Food Record  Wakes up at 9:00 am.   Breakfast: Sometime before 11:00. instant oatmeal, raspberries, coffee mate in oatmeal, coffee, 2 slices toast with butter strawberry jam or cinnamon and sugar. Sometimes a  cookie.   Lunch: Snacks on crackers- wheat cracker [employer]  Dinner: 5:00 pm cold sandwich (ham or turkey, sy), sometimes fruit or veggie  Snacks: Orange or grapes  Beverages: Low water intake.   -take out rarely. Last night went to [employer] with strawberries and scrambled eggs. Steakhouse.   -Bananas give her gas    Has been following this pattern for the past 5 days.    Nutritional Details:   -Food allergies: none  -Food intolerances: none  -Food sensitivities: bananas cause gas  -GI concerns: some gas  -Appetite: good  -Pace of eating: moderate. Eats in front of TV.   -Role of cooking: self, tries to do more convenience meals  -Role of food shopping: buying food at grocery store      Physical Activity:  -Uses a walker. Doesn't go for walks or any exercises.        ASSESSED MALNUTRITION STATUS  % Weight Loss:  None noted  % Intake:  No decreased intake noted  Subcutaneous Fat Loss:  Unable to determine  Loss of Muscle Mass:  Unable to determine  Fluid Retention:  None noted    Malnutrition Diagnosis:  Patient does not meet two of the above criteria necessary for diagnosing malnutrition      Nutrition Diagnosis:  Food and nutrition-related knowledge deficit related to limited exposure to nutrition-related information as evidenced by usual dietary intake unbalanced and excessive in refined carbohydrates and saturated fat, dx DM Type II, and BMI 34.3        Nutrition Intervention:  Nutrition Prescription Summary: Diabetes and Weight Management MNT     Nutrition Education (Content):  -Educated pt on using MyPlate for meal planning and discussed portion sizes   -Discussed recommended and not recommended foods (choosing WGs, lean meats, low-fat dairy, fresh fruits & veggies, unsat fats, and limiting high-sodium and refined sugar foods)  -Educated pt on metabolism and used the fire analogy; talked about the importance of consuming consistent meals/snacks throughout the day and listening to hunger/fullness cues  (handout provided)  -Discussed healthy snack options- protein+complex CHO  -Educated pt on reading food labels  -Discussed consistent CHO intake throughout the day  -Discussed ways to make healthy choices when dining out (choosing baked, broiled, or grilled, unbreaded meats w/o sauces/gravies or choosing them on the side to control amount used)  -Encouraged pt to drink more water throughout the day and explained the importance of hydration.   -Encouraged pt to increase daily PA- include cardiovascular activities daily    Provided the following handouts: Managing Diabetes with Nutrition, General, Healthful Nutrition Therapy and Healthy Snacks- Complex Carb & Protein Pairing      Nutrition Prescription: Macronutrient and Micronutrient details   Dosing weight: 64 kg (adj BW)  Energy: 1600 kcals/day (25 kcal/day)    Justification:  (obese) Protein: 64-77 g Pro/day (1-1.2 g pro/Kg)    Justification:  (obesity guidelines )   Fluid: 8726-2402 mL/day   (25-30 mL/kg)     Justification:  (obese)   Fiber: 21 grams per day           Carbohydrate: 3-4 CHO choices (45-60 g) with meals and 1 CHO choice (15 g) with snacks    Fat: <7% kcal from sat fat       Patient Engagement:   Assessed learning needs and learning preference: Yes.  Teaching Method(s) used: Explanation    Nutrition Education (Application):  a)  Discussed current eating plans / recommended alternative food choices    b)  Patient verbalizes understanding of diet by coming up with meal ideas.     Anticipate >70% compliance   Stage of Change:  preparation      Nutrition Goals:  1) 3 meals and 1 snack per day following 1600 kcal per day meal plan  2) 15 min of PA per day- try chair exercises  3) Switch from butter to Smart Balance  4) Drink 6 cups of water per day    Nutrition Follow Up / Monitoring:   Weight, PO intake, PA, labs (A1c, TG, Chol)      Nutrition Recommendations:  Patient to follow-up with RD in 4 weeks.  Patient has RD contact information to call/email if  needed.      Start time: 11:00  End time: 12:00    Total Time Duration: 60 min      Ivonne Turcios RD, JARAD  Clinical Dietitian  [facility]: [phone]  [facility]: [phone]

## 2021-06-23 ENCOUNTER — HOSPITAL ENCOUNTER (OUTPATIENT)
Dept: PHYSICAL THERAPY | Facility: CLINIC | Age: 81
Setting detail: THERAPIES SERIES
End: 2021-06-23
Attending: NEUROLOGICAL SURGERY
Payer: COMMERCIAL

## 2021-06-23 DIAGNOSIS — R32 URINARY INCONTINENCE, UNSPECIFIED TYPE: Primary | ICD-10-CM

## 2021-06-23 PROCEDURE — 97110 THERAPEUTIC EXERCISES: CPT | Mod: GP | Performed by: PHYSICAL THERAPIST

## 2021-06-23 PROCEDURE — 97116 GAIT TRAINING THERAPY: CPT | Mod: GP | Performed by: PHYSICAL THERAPIST

## 2021-06-23 PROCEDURE — 97162 PT EVAL MOD COMPLEX 30 MIN: CPT | Mod: GP | Performed by: PHYSICAL THERAPIST

## 2021-06-23 NOTE — PROGRESS NOTES
06/23/21 1000   General Information   Type of Visit Initial OP Ortho PT Evaluation   Start of Care Date 06/23/21   Referring Physician Dr Waite   Patient/Family Goals Statement Wants to have no pain with not walking in her apt without the walker. Wants to use her cane vs walker.    Orders Evaluate and Treat   Date of Order 05/06/21   Certification Required? Yes   Medical Diagnosis Scoliosis of lumbar spine, unspecified scoliosis type   Surgical/Medical history reviewed Yes   Precautions/Limitations fall precautions   Body Part(s)   Body Part(s) Lumbar Spine/SI   Presentation and Etiology   Pertinent history of current problem (include personal factors and/or comorbidities that impact the POC) Patient has had LBP for years and in the last 5 years got a lot worse. LBP #0, no leg pain. Pain is in the apt like standing at the sink at the cupboard. Any amt of walking without the 4 wheeled walker. Patient had injections in the LB and it helped about 10% with the pain. Been using the walker for the last 4 years but in the apt uses for night, shower, and when needs help to get out of her chair. When she leaves her apt she has uses the walker. Has bladder and bowel issues , PT to contact primary to get a order for bladder. If has pain in the leg it is the right. The pain will go to the knee on the right. Has a cane but been using the walker a lot. goal to want to use the cane.    Impairments A. Pain;B. Decreased WB tolerance;D. Decreased ROM;E. Decreased flexibility;F. Decreased strength and endurance;G. Impaired balance;H. Impaired gait;P. Bowel or bladder problems   Functional Limitations perform activities of daily living;perform desired leisure / sports activities   Onset date of current episode/exacerbation 06/23/16   Chronicity Chronic   Pain/symptoms exacerbated by B. Walking;C. Lifting;D. Carrying;G. Certain positions;I. Bending;J. ADL;K. Home tasks   Pain/symptoms eased by F. Certain positions;E. Changing  positions;A. Sitting   Current / Previous Interventions   Diagnostic Tests:   (see EPIC MRI, multiple degeneration)   Prior Level of Function   Prior Level of Function-Mobility walks with 4 wheeled walker   Current Level of Function   Patient role/employment history F. Retired   Living environment Apartment/condo   Current equipment-Gait/Locomotion   (4 wheeled walker and has cane but hard to use)   Fall Risk Screen   Fall screen completed by PT   Have you fallen 2 or more times in the past year? No   Have you fallen and had an injury in the past year? No   Is patient a fall risk? Yes   Fall screen comments loses balance will address in PT   Abuse Screen (yes response referral indicated)   Feels Unsafe at Home or Work/School no   Feels Threatened by Someone no   Does Anyone Try to Keep You From Having Contact with Others or Doing Things Outside Your Home? no   Physical Signs of Abuse Present no   Lumbar Spine/SI Objective Findings   Posture flexed and forward head   Gait/Locomotion walking with a 4 wheeled walker, bent forward a little   Balance/Proprioception (Single Leg Stance) poor   Flexion ROM 50% increase in pain   Extension ROM 70% less pain   Repeated Extension-Standing ROM less pain with this   Repeated Flexion-Standing ROM increase in pain and legs feeling weak with this   Lumbar/Hip/Knee/Foot Strength Comments LE strength B 3+/5   Hamstring Flexibility 50%   Repeated Extension Prone unable to lay on belly   Palpation pain at the L2-L5   Planned Therapy Interventions   Planned Therapy Interventions balance training;gait training;joint mobilization;manual therapy;neuromuscular re-education;ROM;strengthening;stretching   Planned Therapy Interventions Comment will add bladder pelvic therapy once order from primary   Planned Modality Interventions   Planned Modality Interventions Electrical stimulation;Ultrasound   Clinical Impression   Criteria for Skilled Therapeutic Interventions Met yes, treatment  indicated   PT Diagnosis LBP, referral pain right LE, balance, gait difficulties, bladder issues   Influenced by the following impairments degenerative spine   Functional limitations due to impairments several functions as issue MAURICIO 31.11   Clinical Presentation Evolving/Changing   Clinical Presentation Rationale several issues along with gait and balance   Clinical Decision Making (Complexity) Moderate complexity   Therapy Frequency 1 time/week   Predicted Duration of Therapy Intervention (days/wks) 90 days   Risk & Benefits of therapy have been explained Yes   Patient, Family & other staff in agreement with plan of care Yes   Clinical Impression Comments LBP, referral pain right LE, balance, gait difficulties, bladder issues   Education Assessment   Preferred Learning Style Listening;Reading;Demonstration   Barriers to Learning No barriers   ORTHO GOALS   PT Ortho Eval Goals 1;2   Ortho Goal 1   Goal Identifier 1   Goal Description Patient is able to stand at her counter for preparing meal and washing dishes for 30 min with no LBP and need to sit   Target Date 09/20/21   Ortho Goal 2   Goal Identifier 2   Goal Description Patient is able to walk in her apt with no LOB, no LBP and no need for AD   Target Date 09/20/21   Total Evaluation Time   PT Eval, Moderate Complexity Minutes (67368) 15   Therapy Certification   Certification date from 06/23/21   Certification date to 09/20/21   Medical Diagnosis Scoliosis of lumbar spine, unspecified scoliosis type   Thank you for the referral,            Clementina Sweeney PT

## 2021-06-23 NOTE — PROGRESS NOTES
06/23/21 1000   General Information   Type of Visit Initial OP Ortho PT Evaluation   Start of Care Date 06/23/21   Referring Physician Dr Waite   Patient/Family Goals Statement Wants to have no pain with not walking in her apt without the walker. Wants to use her cane vs walker.    Orders Evaluate and Treat   Date of Order 05/06/21   Certification Required? Yes   Medical Diagnosis Scoliosis of lumbar spine, unspecified scoliosis type   Surgical/Medical history reviewed Yes   Precautions/Limitations fall precautions   Body Part(s)   Body Part(s) Lumbar Spine/SI   Presentation and Etiology   Pertinent history of current problem (include personal factors and/or comorbidities that impact the POC) Patient has had LBP for years and in the last 5 years got a lot worse. LBP #0, no leg pain. Pain is in the apt like standing at the sink at the cupboard. Any amt of walking without the 4 wheeled walker. Patient had injections in the LB and it helped about 10% with the pain. Been using the walker for the last 4 years but in the apt uses for night, shower, and when needs help to get out of her chair. When she leaves her apt she has uses the walker. Has bladder and bowel issues , PT to contact primary to get a order for bladder. If has pain in the leg it is the right. The pain will go to the knee on the right. Has a cane but been using the walker a lot. goal to want to use the cane.    Impairments A. Pain;B. Decreased WB tolerance;D. Decreased ROM;E. Decreased flexibility;F. Decreased strength and endurance;G. Impaired balance;H. Impaired gait;P. Bowel or bladder problems   Functional Limitations perform activities of daily living;perform desired leisure / sports activities   Onset date of current episode/exacerbation 06/23/16   Chronicity Chronic   Pain/symptoms exacerbated by B. Walking;C. Lifting;D. Carrying;G. Certain positions;I. Bending;J. ADL;K. Home tasks   Pain/symptoms eased by F. Certain positions;E. Changing  positions;A. Sitting   Current / Previous Interventions   Diagnostic Tests:   (see EPIC MRI, multiple degeneration)   Prior Level of Function   Prior Level of Function-Mobility walks with 4 wheeled walker   Current Level of Function   Patient role/employment history F. Retired   Living environment Apartment/condo   Current equipment-Gait/Locomotion   (4 wheeled walker and has cane but hard to use)   Fall Risk Screen   Fall screen completed by PT   Have you fallen 2 or more times in the past year? No   Have you fallen and had an injury in the past year? No   Is patient a fall risk? Yes   Fall screen comments loses balance will address in PT   Abuse Screen (yes response referral indicated)   Feels Unsafe at Home or Work/School no   Feels Threatened by Someone no   Does Anyone Try to Keep You From Having Contact with Others or Doing Things Outside Your Home? no   Physical Signs of Abuse Present no   Lumbar Spine/SI Objective Findings   Posture flexed and forward head   Gait/Locomotion walking with a 4 wheeled walker, bent forward a little   Balance/Proprioception (Single Leg Stance) poor   Flexion ROM 50% increase in pain   Extension ROM 70% less pain   Repeated Extension-Standing ROM less pain with this   Repeated Flexion-Standing ROM increase in pain and legs feeling weak with this   Lumbar/Hip/Knee/Foot Strength Comments LE strength B 3+/5   Hamstring Flexibility 50%   Repeated Extension Prone unable to lay on belly   Palpation pain at the L2-L5   Planned Therapy Interventions   Planned Therapy Interventions balance training;gait training;joint mobilization;manual therapy;neuromuscular re-education;ROM;strengthening;stretching   Planned Therapy Interventions Comment will add bladder pelvic therapy once order from primary   Planned Modality Interventions   Planned Modality Interventions Electrical stimulation;Ultrasound   Clinical Impression   Criteria for Skilled Therapeutic Interventions Met yes, treatment  indicated   PT Diagnosis LBP, referral pain right LE, balance, gait difficulties, bladder issues   Influenced by the following impairments degenerative spine   Functional limitations due to impairments several functions as issue MUARICIO 31.11   Clinical Presentation Evolving/Changing   Clinical Presentation Rationale several issues along with gait and balance   Clinical Decision Making (Complexity) Moderate complexity   Therapy Frequency 1 time/week   Predicted Duration of Therapy Intervention (days/wks) 90 days   Risk & Benefits of therapy have been explained Yes   Patient, Family & other staff in agreement with plan of care Yes   Clinical Impression Comments LBP, referral pain right LE, balance, gait difficulties, bladder issues   Education Assessment   Preferred Learning Style Listening;Reading;Demonstration   Barriers to Learning No barriers   ORTHO GOALS   PT Ortho Eval Goals 1;2;3   Ortho Goal 1   Goal Identifier 1   Goal Description Patient is able to stand at her counter for preparing meal and washing dishes for 30 min with no LBP and need to sit   Target Date 09/20/21   Ortho Goal 2   Goal Identifier 2   Goal Description Patient is able to walk in her apt with no LOB, no LBP and no need for AD   Target Date 09/20/21   Ortho Goal 3   Goal Identifier 3   Goal Description Patient no longer has urinary incontinence and no need for pad use   Target Date 09/20/21   Total Evaluation Time   PT Eval, Moderate Complexity Minutes (50850) 15   Therapy Certification   Certification date from 06/23/21   Certification date to 09/20/21   Medical Diagnosis Scoliosis of lumbar spine, unspecified scoliosis type   Thank you for the referral,            Clementina Sweeney PT

## 2021-06-23 NOTE — PROGRESS NOTES
[facility]          OUTPATIENT PHYSICAL THERAPY ORTHOPEDIC EVALUATION  PLAN OF TREATMENT FOR OUTPATIENT REHABILITATION  (COMPLETE FOR INITIAL CLAIMS ONLY)  Patient's Last Name, First Name, M.I.  YOB: 1940  Mya Hui    Provider s Name:  [facility]   Medical Record No.  [id number]   Start of Care Date:  06/23/21   Onset Date:  06/23/16   Type:     _X__PT   ___OT   ___SLP Medical Diagnosis:  Scoliosis of lumbar spine, unspecified scoliosis type     PT Diagnosis:  LBP, referral pain right LE, balance, gait difficulties, bladder issues, urinary incontinence   Visits from SOC:  1      _________________________________________________________________________________  Plan of Treatment/Functional Goals:  balance training, gait training, joint mobilization, manual therapy, neuromuscular re-education, ROM, strengthening, stretching  will add bladder pelvic therapy once order from primary.   Electrical stimulation, Ultrasound,biofeedback for PF     Goals  Goal Identifier: 1  Goal Description: Patient is able to stand at her counter for preparing meal and washing dishes for 30 min with no LBP and need to sit  Target Date: 09/20/21    Goal Identifier: 2  Goal Description: Patient is able to walk in her apt with no LOB, no LBP and no need for AD  Target Date: 09/20/21    Goal Identifier: 3  Goal Description: Patient no longer has urinary incontinence and no need for pad use  Target Date: 09/20/21      Therapy Frequency:  1 time/week  Predicted Duration of Therapy Intervention:  90 days    Clementina Sweeney, PT                 I CERTIFY THE NEED FOR THESE SERVICES FURNISHED UNDER        THIS PLAN OF TREATMENT AND WHILE UNDER MY CARE     (Physician co-signature of this document indicates review and certification of the therapy plan).                       Certification Date From:  06/23/21   Certification Date To:  09/20/21    Referring Provider:   Dr Waite    Initial Assessment        See Epic Evaluation Start of Care Date: 06/23/21

## 2021-06-25 DIAGNOSIS — I10 ESSENTIAL HYPERTENSION WITH GOAL BLOOD PRESSURE LESS THAN 140/90: ICD-10-CM

## 2021-06-25 RX ORDER — DILTIAZEM HYDROCHLORIDE 120 MG/1
120 CAPSULE, COATED, EXTENDED RELEASE ORAL 2 TIMES DAILY
Qty: 180 CAPSULE | Refills: 1 | OUTPATIENT
Start: 2021-06-25

## 2021-06-25 NOTE — TELEPHONE ENCOUNTER
Prescription was sent 3/24/21 for #180 with 1 refills.  Pharmacy notified via E-Prescribe refusal.     Dacia Serrato RN on 6/25/2021 at 3:54 PM

## 2021-07-06 ENCOUNTER — HOSPITAL ENCOUNTER (OUTPATIENT)
Dept: PHYSICAL THERAPY | Facility: CLINIC | Age: 81
Setting detail: THERAPIES SERIES
End: 2021-07-06
Attending: NEUROLOGICAL SURGERY
Payer: COMMERCIAL

## 2021-07-06 PROCEDURE — 97110 THERAPEUTIC EXERCISES: CPT | Mod: GP | Performed by: PHYSICAL THERAPIST

## 2021-07-06 PROCEDURE — 97116 GAIT TRAINING THERAPY: CPT | Mod: GP | Performed by: PHYSICAL THERAPIST

## 2021-07-21 ENCOUNTER — HOSPITAL ENCOUNTER (OUTPATIENT)
Dept: PHYSICAL THERAPY | Facility: CLINIC | Age: 81
Setting detail: THERAPIES SERIES
End: 2021-07-21
Attending: NEUROLOGICAL SURGERY
Payer: COMMERCIAL

## 2021-07-21 PROCEDURE — 97116 GAIT TRAINING THERAPY: CPT | Mod: GP | Performed by: PHYSICAL THERAPIST

## 2021-07-21 PROCEDURE — 90912 BFB TRAINING 1ST 15 MIN: CPT | Mod: GP | Performed by: PHYSICAL THERAPIST

## 2021-07-21 PROCEDURE — 97110 THERAPEUTIC EXERCISES: CPT | Mod: GP | Performed by: PHYSICAL THERAPIST

## 2021-07-21 PROCEDURE — 90913 BFB TRAINING EA ADDL 15 MIN: CPT | Mod: GP | Performed by: PHYSICAL THERAPIST

## 2021-07-28 ENCOUNTER — HOSPITAL ENCOUNTER (OUTPATIENT)
Dept: PHYSICAL THERAPY | Facility: CLINIC | Age: 81
Setting detail: THERAPIES SERIES
End: 2021-07-28
Attending: NEUROLOGICAL SURGERY
Payer: COMMERCIAL

## 2021-07-28 PROCEDURE — 97110 THERAPEUTIC EXERCISES: CPT | Mod: GP | Performed by: PHYSICAL THERAPIST

## 2021-07-28 PROCEDURE — 97116 GAIT TRAINING THERAPY: CPT | Mod: GP | Performed by: PHYSICAL THERAPIST

## 2021-08-09 ENCOUNTER — NURSE TRIAGE (OUTPATIENT)
Dept: FAMILY MEDICINE | Facility: OTHER | Age: 81
End: 2021-08-09

## 2021-08-09 NOTE — TELEPHONE ENCOUNTER
"Scheduled patient to be seen in clinic tomorrow per protocol.    Reason for Disposition    Patient wants to be seen    Additional Information    Negative: Major bleeding (actively dripping or spurting) that can't be stopped    Negative: Amputation of toe    Negative: Sounds like a life-threatening emergency to the triager    Negative: High pressure injection injury (e.g., from paint gun, usually work-related    Negative: Looks infected (e.g., spreading redness, pus, red streak)    Negative: Looks like a broken bone or dislocated joint (e.g., crooked or deformed)    Negative: Skin is split open or gaping (length > 1/2 inch or 12 mm)    Negative: Bleeding won't stop after 10 minutes of direct pressure (using correct technique)    Negative: Dirt in the wound and not removed after 15 minutes of scrubbing    Negative: Sounds like a serious injury to the triager    Negative: Looks infected (e.g., spreading redness, pus, red streak)    Negative: Toenail is completely torn off    Negative: Base of toenail has popped out from under skin fold    Negative: SEVERE pain (e.g., excruciating)    Negative: MODERATE-SEVERE pain and blood present under the nail (usually > 50% of nail bed)    Negative: No prior tetanus shots (or is not fully vaccinated) and any wound (e.g., cut or scrape)    Negative: HIV positive or severe immunodeficiency (severely weak immune system) and DIRTY cut or scrape    Answer Assessment - Initial Assessment Questions  1. MECHANISM: \"How did the injury happen?\"       Hit an object on Friday left pinky toe    2. ONSET: \"When did the injury happen?\" (Minutes or hours ago)       Last Friday 8/6/2021    3. LOCATION: \"What part of the toe is injured?\" \"Is the nail damaged?\"       No    4. APPEARANCE of TOE INJURY: \"What does the injury look like?\"       Little toe swollen and foot is not swollen to ankle    5. SEVERITY: \"Can you use the foot normally?\" \"Can you walk?\"       Not used normally , yes can walk    6. " "SIZE: For cuts, bruises, or swelling, ask: \"How large is it?\" (e.g., inches or centimeters;  entire toe)       Swelling    7. PAIN: \"Is there pain?\" If so, ask: \"How bad is the pain?\"   (e.g., Scale 1-10; or mild, moderate, severe)      When sitting no pain, walking 5/10    8. TETANUS: For any breaks in the skin, ask: \"When was the last tetanus booster?\"      NA    9. DIABETES: \"Do you have a history of diabetes or poor circulation in the feet?\"      No    10. OTHER SYMPTOMS: \"Do you have any other symptoms?\"         No    11. PREGNANCY: \"Is there any chance you are pregnant?\" \"When was your last menstrual period?\"        NA    Protocols used: TOE INJURY-A-OH  Julieta Mckinley RN    "

## 2021-08-09 NOTE — PROGRESS NOTES
Assessment & Plan     Toe injury, left, initial encounter  Closed displaced fracture of proximal phalanx of lesser toe of left foot, initial encounter  Xray of left foot showed closed, minimally displaced fracture of the proximal phalanx of the 5th toe on left foot. Plan to continue tylenol with ibuprofen as needed for pain. Recommended to ice, heat, and elevate foot as needed for pain and swelling. Given post op shoe to help with stability and with pain caused by her regular shoe. Advised to follow up for possible podiatry evaluation in 2 weeks if pain is not improving.    - XR Toe Left G/E 2 Views      Return in about 2 weeks (around 8/24/2021) for If not improving, worse, or new concerns.    Options for treatment and follow-up care were reviewed with the patient and/or guardian. Patient and/or guardian engaged in the decision making process and verbalized understanding of the options discussed and agreed with the final plan.    I, Ray Castrejon PA-C, was present with the Physician Assistant student who participated in the service and in the documentation of the note.  I have verified the history and personally performed the physical exam and medical decision making.  I agree with the assessment and plan of care as documented in the note.       HORACIO Tripp-S2  BRANDEN MaynardC  [facility] Roque is a 81 year old who presents for the following health issues     History of Present Illness       She eats 0-1 servings of fruits and vegetables daily.She consumes 0 sweetened beverage(s) daily.She exercises with enough effort to increase her heart rate 9 or less minutes per day.  She exercises with enough effort to increase her heart rate 3 or less days per week.   She is taking medications regularly.       Musculoskeletal problem/pain  Onset/Duration: 4 days ago  Description  Location: pinky toe - left  Joint Swelling: YES  Redness: YES  Pain: YES  Warmth:  no  Intensity:  Moderate  3/10  Progression of Symptoms:  intermittent  Accompanying signs and symptoms:   Fevers: no  Numbness/tingling/weakness: YES  History  Trauma to the area: YES, stubbed left pinky toe into her locked walker  Recent illness:  not applicable  Previous similar problem: YES  Previous evaluation:  no  Precipitating or alleviating factors:  Aggravating factors include: moving about and putting pressure on it  Therapies tried and outcome: ice, acetaminophen and Ibuprofen    Friday evening had walker locked and hit left little toe on walker. Pain with walking, throbbing/ache with rest and when sheets touch it. Unable to get shoes on due pain and swelling. Endorses swelling with some bruising along the toe. Takes 6 tylenol daily. Has used ibuprofen as needed. Pain is improving. Denies numbness or tingling or any other injury.     Review of Systems   CONSTITUTIONAL: NEGATIVE for fever, chills  INTEGUMENTARY/SKIN: POSITIVE for bruising along the injured toe  MUSCULOSKELETAL: NEGATIVE for significant arthralgias or myalgia. POSTIVE for pain of injured toe  NEURO: NEGATIVE for weakness, dizziness or paresthesias      Objective    /70   Pulse 76   Temp 97.3  F (36.3  C) (Temporal)   Resp 16   Wt 93.9 kg (207 lb)   LMP  (LMP Unknown)   SpO2 97%   BMI 34.49 kg/m    Body mass index is 34.49 kg/m .  Physical Exam   GENERAL: healthy, alert and no distress  MS: no gross musculoskeletal defects noted, no edema. Left foot: tenderness to palpation of the 5th toe, specifically the proximal phalange, tenderness of the mtp joint of the 4th toe, and along the distal 5th metatarsal. Swelling of the 5th toe. Mild ecchymosis at the base of the 4th toe. Baseline passive ROM of the ankle  SKIN: no suspicious lesions or rashes, normal capillary refill in the left foot and toes.   NEURO: Normal strength and tone, mentation intact and speech normal  PSYCH: mentation appears normal, affect normal/bright    XR Toe  Left G/E 2 Views    Result Date: 8/10/2021  XR TOE LEFT G/E 2 VIEWS 8/10/2021 11:37 AM HISTORY: 4th and 5th digit injury, also tender along 5th MTP; Toe injury, left, initial encounter     IMPRESSION: On the lateral view, there may be a minimally displaced fracture in the diaphysis of the fifth toe proximal phalanx, not visible on the other projections. No apparent fourth toe or fifth metatarsal fracture. ROBIN NELSON MD   SYSTEM ID:  [id number]

## 2021-08-10 ENCOUNTER — OFFICE VISIT (OUTPATIENT)
Dept: FAMILY MEDICINE | Facility: OTHER | Age: 81
End: 2021-08-10
Payer: COMMERCIAL

## 2021-08-10 ENCOUNTER — ANCILLARY PROCEDURE (OUTPATIENT)
Dept: GENERAL RADIOLOGY | Facility: OTHER | Age: 81
End: 2021-08-10
Attending: PHYSICIAN ASSISTANT
Payer: COMMERCIAL

## 2021-08-10 VITALS
DIASTOLIC BLOOD PRESSURE: 70 MMHG | SYSTOLIC BLOOD PRESSURE: 130 MMHG | BODY MASS INDEX: 34.49 KG/M2 | HEART RATE: 76 BPM | RESPIRATION RATE: 16 BRPM | WEIGHT: 207 LBS | TEMPERATURE: 97.3 F | OXYGEN SATURATION: 97 %

## 2021-08-10 DIAGNOSIS — S92.512A CLOSED DISPLACED FRACTURE OF PROXIMAL PHALANX OF LESSER TOE OF LEFT FOOT, INITIAL ENCOUNTER: Primary | ICD-10-CM

## 2021-08-10 DIAGNOSIS — S99.922A TOE INJURY, LEFT, INITIAL ENCOUNTER: ICD-10-CM

## 2021-08-10 PROCEDURE — 99213 OFFICE O/P EST LOW 20 MIN: CPT | Performed by: PHYSICIAN ASSISTANT

## 2021-08-10 PROCEDURE — 73660 X-RAY EXAM OF TOE(S): CPT | Mod: LT | Performed by: RADIOLOGY

## 2021-08-10 ASSESSMENT — PAIN SCALES - GENERAL: PAINLEVEL: MILD PAIN (3)

## 2021-08-31 ENCOUNTER — OFFICE VISIT (OUTPATIENT)
Dept: FAMILY MEDICINE | Facility: OTHER | Age: 81
End: 2021-08-31
Payer: COMMERCIAL

## 2021-08-31 ENCOUNTER — ANCILLARY PROCEDURE (OUTPATIENT)
Dept: GENERAL RADIOLOGY | Facility: OTHER | Age: 81
End: 2021-08-31
Attending: PHYSICIAN ASSISTANT
Payer: COMMERCIAL

## 2021-08-31 VITALS
OXYGEN SATURATION: 96 % | SYSTOLIC BLOOD PRESSURE: 128 MMHG | HEART RATE: 56 BPM | WEIGHT: 208 LBS | RESPIRATION RATE: 16 BRPM | BODY MASS INDEX: 34.66 KG/M2 | DIASTOLIC BLOOD PRESSURE: 70 MMHG | TEMPERATURE: 97 F

## 2021-08-31 DIAGNOSIS — S92.512A CLOSED DISPLACED FRACTURE OF PROXIMAL PHALANX OF LESSER TOE OF LEFT FOOT, INITIAL ENCOUNTER: ICD-10-CM

## 2021-08-31 DIAGNOSIS — M79.672 LEFT FOOT PAIN: ICD-10-CM

## 2021-08-31 DIAGNOSIS — N32.81 OAB (OVERACTIVE BLADDER): ICD-10-CM

## 2021-08-31 DIAGNOSIS — S92.512A CLOSED DISPLACED FRACTURE OF PROXIMAL PHALANX OF LESSER TOE OF LEFT FOOT, INITIAL ENCOUNTER: Primary | ICD-10-CM

## 2021-08-31 PROCEDURE — 73630 X-RAY EXAM OF FOOT: CPT | Mod: LT | Performed by: RADIOLOGY

## 2021-08-31 PROCEDURE — 99213 OFFICE O/P EST LOW 20 MIN: CPT | Performed by: PHYSICIAN ASSISTANT

## 2021-08-31 RX ORDER — SENNOSIDES 8.6 MG
3 CAPSULE ORAL DAILY
Qty: 90 EACH | Refills: 11 | Status: SHIPPED | OUTPATIENT
Start: 2021-08-31

## 2021-08-31 ASSESSMENT — PAIN SCALES - GENERAL: PAINLEVEL: MILD PAIN (2)

## 2021-08-31 NOTE — PROGRESS NOTES
Assessment & Plan     Closed displaced fracture of proximal phalanx of lesser toe of left foot, initial encounter  The fracture is likely healing as expected, still mildly tender but no signs on xray there has been any change.   - XR Toe Left G/E 2 Views; Future  - XR Foot Left G/E 3 Views; Future    Left foot pain  High suspicion the foot pain is more tendonitis.  It is only on the dorsum and nonspecific without point bony tenderness, worse with lengthening of the tendons of the dorsum of the foot.  Recommended 3-5 days of Advil 3 times per day, ice/heat, and physical therapy.  If not improving then recommend seeing Podiatry.   - [facility] Referral; Future  - Orthopedic  Referral; Future    OAB (overactive bladder)  Needs these because they are very expensive and can potentially get covered by insurance.   - Incontinence Supply Disposable (PROTECTIVE UNDERWEAR SUPER LG) MISC; 3 each daily      Return for If not improving, sooner if worse or new concerns.    Options for treatment and follow-up care were reviewed with the patient and/or guardian. Patient and/or guardian engaged in the decision making process and verbalized understanding of the options discussed and agreed with the final plan.    Ray Castrejon PA-C  [facility] LALITA Roque is a 81 year old who presents for the following health issues     HPI     Musculoskeletal problem/pain  Onset/Duration: 3 days  Description  Location: middle toe - right  Joint Swelling: YES  Redness: YES  Pain: YES  Warmth: no  Intensity:  moderate  Progression of Symptoms:  same  Accompanying signs and symptoms:   Fevers: not applicable  Numbness/tingling/weakness: not applicable  History  Trauma to the area: YES, ran into something that did not move  Recent illness:  not applicable  Previous similar problem: YES  Previous evaluation:  no  Precipitating or alleviating factors:  Aggravating factors include: bending it  Therapies  tried and outcome: nothing    Pt is also here for a follow up on left little toe. She does not feel like it is healing the way it should. It is still swollen. She is not wearing the support shoe she was given unable to tighten it enough. She states she tends to spread her toes and is wondering if this is causing more harm.    She says pain is present only when moving and bearing weight on it.  It has not gotten worse, no new injuries to this foot.      Review of Systems   Constitutional,msk, skin, neuro systems are negative, except as otherwise noted.      Objective    /70   Pulse 56   Temp 97  F (36.1  C) (Temporal)   Resp 16   Wt 94.3 kg (208 lb)   LMP  (LMP Unknown)   SpO2 96%   BMI 34.66 kg/m    Body mass index is 34.66 kg/m .  Physical Exam   GENERAL: healthy, alert and no distress  MS: no gross musculoskeletal defects noted, no edema.  Generalized tenderness on the dorsum of the left foot at the base of the 2, 3,4th digits, full passive ROM of the digits on the left foot with discomfort elicited with flexion of the digits, no bony tenderness present other than at the 5th proximal phalanx of the pinky toe on the left.    SKIN: no suspicious lesions or rashes  NEURO: Normal strength and tone, mentation intact and speech normal  PSYCH: mentation appears normal, affect normal/bright    Results for orders placed or performed in visit on 08/31/21   XR Foot Left G/E 3 Views     Status: None (Preliminary result)    Narrative    LEFT FOOT THREE OR MORE VIEWS   8/31/2021 11:12 AM     HISTORY:  Closed displaced fracture of proximal phalanx of lesser toe  of left foot, initial encounter.    COMPARISON:  8/10/2021.    FINDINGS: Medial malleolar screw. Small bony excrescence versus  osteochondroma of the third metatarsal.      Impression    IMPRESSION: Allowing for different projections, no significant change  is appreciated at the fifth proximal phalanx fracture.

## 2021-08-31 NOTE — PATIENT INSTRUCTIONS
- Set up with physical therapy - they will call to schedule  - few days of ibuprofen/advil take very 8 hours.   - Ice/heat  - Stretching  - If with this and physical therapy it isn't improving in the next couple of weeks schedule with Dr. Cobb.

## 2021-09-02 NOTE — PROGRESS NOTES
Outpatient Physical Therapy Discharge Note     Patient: Mya Hui  : 1940    Beginning/End Dates of Reporting Period:  21 to 21    Referring Provider: Dr Waite    Therapy Diagnosis: urinary incontinence     Client Self Report: PF ex's not going good while laying in bed. But the urge is too great at night to get the urge to calm with the ex's. Quit taking the water pills. Able to do the elevator during the day for the PF and this is helping to put off the urine urge and the leaking. Over all the back pain is the same with the standing , maybe better with the cane walking.     Objective Measurements:       Objective Measure: biofeedback 21  Details: resting .1mv  avg hold with the 10 sec holds is 3-5mv.  avg with the 2 sec holds is 6 mv and all good resting.     Objective Measure: pain in standing  Details: helps with the back brace    Objective Measure: gets up at night to urinate  Details: 3x night and urge very great      Objective Measure: leaking urine during the day   Details: 1x per day    Objective Measure: wait between voids  Details: 1-2 hours        Goals:  Not known  If goals are met pt never returned to PT, pt called and said she wants to cont her HEP on her own for now.     Plan:  Discharge from therapy.    Discharge:    Reason for Discharge: Patient has failed to schedule further appointments.        Discharge Plan: Patient to continue home program.    Thank you for the referral,            Clementina Sweeney PT

## 2021-09-13 ENCOUNTER — TELEPHONE (OUTPATIENT)
Dept: FAMILY MEDICINE | Facility: OTHER | Age: 81
End: 2021-09-13

## 2021-09-13 NOTE — TELEPHONE ENCOUNTER
Since Friday. Dizzy off and on and sick to stomach. Tired. Has been sleeping quiet a bit.  Yesterday was ok, now today starting all over.  Trying to stay hydrated.  Not really hungry. No diarrhea.  No cough no fever.  BP today was 140/70 no cough/congestion no sinus pressure.  No chest pain. No SOB  Would like some sort of visit.  Unable to work in today.  Will add on for virtual visit.  If worsening needs to be seen in UC.  Patient address to plan.    Ramy Sanchez, JOSE RN, RN, PHN  [facility] ~ Registered Nurse  Clinic Triage ~ [place] [facility] [place]  September 13, 2021

## 2021-09-14 ENCOUNTER — VIRTUAL VISIT (OUTPATIENT)
Dept: FAMILY MEDICINE | Facility: OTHER | Age: 81
End: 2021-09-14
Payer: COMMERCIAL

## 2021-09-14 DIAGNOSIS — R42 DIZZINESS: Primary | ICD-10-CM

## 2021-09-14 PROCEDURE — 99207 PR NON-BILLABLE SERV PER CHARTING: CPT | Performed by: PHYSICIAN ASSISTANT

## 2021-09-14 NOTE — PROGRESS NOTES
"Mya is a 81 year old who is being evaluated via a billable telephone visit.      What phone number would you like to be contacted at? [phone]  How would you like to obtain your AVS? Mail a copy    Assessment & Plan     Dizziness  Uncertain the cause of her symptoms.  Does not sound consistent for BPPV but definitely a concern. Less concerned for COVID given lack of additional symptoms but always a possibility, could consider testing tomorrow if appropriate.  She is on medications that could alter her BMP and may want to check this.  Consider cardiac etiologies as well but difficult to assess over the phone.  We set up for an office appointment tomorrow to further assess her concerns.       Return in about 1 day (around 9/15/2021) for Recheck.    Options for treatment and follow-up care were reviewed with the patient and/or guardian. Patient and/or guardian engaged in the decision making process and verbalized understanding of the options discussed and agreed with the final plan.    Ray Castrejon PA-C  [facility]    Wan Roque is a 81 year old who presents for the following health issues     HPI     Dizziness  Onset/Duration: Dizzy spells. This started last Friday.   BP has been doing well 127/66 this morning.   Description:   Do you feel faint: no  Does it feel like the surroundings (bed, room) are moving: no  Unsteady/off balance: YES  Have you passed out or fallen: no  Feels very dizzy when stands up.  Using walker all the time.  Also experiences symptoms when she is walking but very rarely has much symptoms while sitting.  It will come on and then go away.  Last night felt fine.  Her eyes feel \"funny\" but not sure if that is related or not, she did have eye exam recently.  More difficulty trying to focus.    Intensity: waxing and waning.   Progression of Symptoms: waxing and waning  Accompanying Signs & Symptoms:  Heart palpitations or chest pain: no  Nausea, vomiting: " YES some nausea but no vomiting.   Weakness or lack of coordination in arms or legs: no  Vision or speech changes: VISION - blurry or hard to focus.   Numbness or tingling: no  Ringing in ears (Tinnitus): YES has this but chronic and unchanged.   Hearing Loss: no  History:   Head trauma/concussion history: no  Previous similar symptoms: no  Recent bleeding history: no  Any new medications (BP?): no  Precipitating factors:   Worse with activity: YES  Worse with head movement: no  Alleviating factors:   Does staying in a fixed position give relief: no   Therapies tried and outcome: None    A week ago had some problems with her neck on the left side, thought it was due to a fan, so she put a cold pack and in a couple of days it went away.     Started Friday afternoon, Sunday she felt really good and then they came on again yesterday.  They were ok later in the evening.  This morning was better, now starting more this afternoon.     No fevers, chills, sinus pain, congestion, sore throat, cough, chest pain, shortness of breath, palpitations, abdominal pain, vomiting.      Had a mild headache - took BP and it was normal.  Has just a very mild headache right now.  Not necessarily correlated to her dizziness.     Review of Systems   Constitutional, HEENT, cardiovascular, pulmonary, gi and gu systems are negative, except as otherwise noted.      Objective           Vitals:  No vitals were obtained today due to virtual visit.    Physical Exam   healthy, alert and no distress  PSYCH: Alert and oriented times 3; coherent speech, normal   rate and volume, able to articulate logical thoughts, able   to abstract reason, no tangential thoughts, no hallucinations   or delusions  Her affect is normal  RESP: No cough, no audible wheezing, able to talk in full sentences  Remainder of exam unable to be completed due to telephone visits          Phone call duration: 12:16 minutes

## 2021-09-15 ENCOUNTER — OFFICE VISIT (OUTPATIENT)
Dept: FAMILY MEDICINE | Facility: OTHER | Age: 81
End: 2021-09-15
Payer: COMMERCIAL

## 2021-09-15 VITALS
SYSTOLIC BLOOD PRESSURE: 128 MMHG | BODY MASS INDEX: 34.49 KG/M2 | HEART RATE: 60 BPM | WEIGHT: 207 LBS | DIASTOLIC BLOOD PRESSURE: 72 MMHG | TEMPERATURE: 97.6 F | RESPIRATION RATE: 18 BRPM | OXYGEN SATURATION: 97 %

## 2021-09-15 DIAGNOSIS — R42 DIZZINESS: Primary | ICD-10-CM

## 2021-09-15 DIAGNOSIS — E11.51 DIABETES MELLITUS WITH PERIPHERAL VASCULAR DISEASE (H): ICD-10-CM

## 2021-09-15 LAB
ANION GAP SERPL CALCULATED.3IONS-SCNC: 3 MMOL/L (ref 3–14)
BASOPHILS # BLD AUTO: 0 10E3/UL (ref 0–0.2)
BASOPHILS NFR BLD AUTO: 0 %
BUN SERPL-MCNC: 14 MG/DL (ref 7–30)
CALCIUM SERPL-MCNC: 9 MG/DL (ref 8.5–10.1)
CHLORIDE BLD-SCNC: 111 MMOL/L (ref 94–109)
CO2 SERPL-SCNC: 27 MMOL/L (ref 20–32)
CREAT SERPL-MCNC: 1 MG/DL (ref 0.52–1.04)
CRP SERPL-MCNC: <2.9 MG/L (ref 0–8)
EOSINOPHIL # BLD AUTO: 0.2 10E3/UL (ref 0–0.7)
EOSINOPHIL NFR BLD AUTO: 3 %
ERYTHROCYTE [DISTWIDTH] IN BLOOD BY AUTOMATED COUNT: 12.6 % (ref 10–15)
ERYTHROCYTE [SEDIMENTATION RATE] IN BLOOD BY WESTERGREN METHOD: 5 MM/HR (ref 0–30)
GFR SERPL CREATININE-BSD FRML MDRD: 54 ML/MIN/1.73M2
GLUCOSE BLD-MCNC: 100 MG/DL (ref 70–99)
HBA1C MFR BLD: 5.8 % (ref 0–5.6)
HCT VFR BLD AUTO: 41.8 % (ref 35–47)
HGB BLD-MCNC: 13.6 G/DL (ref 11.7–15.7)
LYMPHOCYTES # BLD AUTO: 1.9 10E3/UL (ref 0.8–5.3)
LYMPHOCYTES NFR BLD AUTO: 31 %
MCH RBC QN AUTO: 30.6 PG (ref 26.5–33)
MCHC RBC AUTO-ENTMCNC: 32.5 G/DL (ref 31.5–36.5)
MCV RBC AUTO: 94 FL (ref 78–100)
MONOCYTES # BLD AUTO: 0.6 10E3/UL (ref 0–1.3)
MONOCYTES NFR BLD AUTO: 10 %
NEUTROPHILS # BLD AUTO: 3.4 10E3/UL (ref 1.6–8.3)
NEUTROPHILS NFR BLD AUTO: 56 %
PLATELET # BLD AUTO: 202 10E3/UL (ref 150–450)
POTASSIUM BLD-SCNC: 4.1 MMOL/L (ref 3.4–5.3)
RBC # BLD AUTO: 4.45 10E6/UL (ref 3.8–5.2)
SODIUM SERPL-SCNC: 141 MMOL/L (ref 133–144)
WBC # BLD AUTO: 6 10E3/UL (ref 4–11)

## 2021-09-15 PROCEDURE — 80048 BASIC METABOLIC PNL TOTAL CA: CPT | Performed by: PHYSICIAN ASSISTANT

## 2021-09-15 PROCEDURE — 86140 C-REACTIVE PROTEIN: CPT | Performed by: PHYSICIAN ASSISTANT

## 2021-09-15 PROCEDURE — 93000 ELECTROCARDIOGRAM COMPLETE: CPT | Performed by: PHYSICIAN ASSISTANT

## 2021-09-15 PROCEDURE — 85025 COMPLETE CBC W/AUTO DIFF WBC: CPT | Performed by: PHYSICIAN ASSISTANT

## 2021-09-15 PROCEDURE — 99214 OFFICE O/P EST MOD 30 MIN: CPT | Performed by: PHYSICIAN ASSISTANT

## 2021-09-15 PROCEDURE — 85652 RBC SED RATE AUTOMATED: CPT | Performed by: PHYSICIAN ASSISTANT

## 2021-09-15 PROCEDURE — 83036 HEMOGLOBIN GLYCOSYLATED A1C: CPT | Performed by: PHYSICIAN ASSISTANT

## 2021-09-15 PROCEDURE — 36415 COLL VENOUS BLD VENIPUNCTURE: CPT | Performed by: PHYSICIAN ASSISTANT

## 2021-09-15 RX ORDER — MECLIZINE HYDROCHLORIDE 25 MG/1
25 TABLET ORAL 3 TIMES DAILY PRN
Qty: 12 TABLET | Refills: 0 | Status: SHIPPED | OUTPATIENT
Start: 2021-09-15 | End: 2022-05-26

## 2021-09-15 ASSESSMENT — PAIN SCALES - GENERAL: PAINLEVEL: NO PAIN (0)

## 2021-09-15 NOTE — PROGRESS NOTES
Assessment & Plan     Dizziness  Uncertain the exact cause of her symptoms.  Give initial high BP question cardiac cause, her BPs have stabilized, she has no shortness of breath or chest symptoms to suggest continued cardiac concerns but something to consider, no bruits on examination but consider ultrasound in the future.  Question infection but she has not developed any symptoms suggestive of COVID19 or other infections at this time.  Question central nervous system, no signs of neurologic deficits on examination today but given symptoms consider MRI/MRA in the very near future.  Her exam there was some nystagmus present and she was very symptomatic when attempting Ashkan Hallpike on the right, her symptom description is not 100% accurate for BPPV but given how symptomatic on right but not on the left question BPPV as diagnosis, did refer to physical therapy and was able to get in on Friday for Epley Maneuver given difficulty to perform Ashkan Hallpike in clinic feel clinic assistance for Epley is better but did give instructions for daughter on ways to attempt at home, did send message to physical therapy who will update provider if they agree with this diagnosis.  Other inner ear issues still concern including labyrinthitis.  No symptoms consistent with temporal arteritis at this time, no tenderness on examination.  Her headaches have really been more neck related and very mild and intermittent     Advised ED if worsening symptoms, severe headaches, vision changes, paresthesias, difficulty moving/speaking outside of dizziness.      Otherwise consider MRI/MRA if physical therapy doesn't agree with BPPV diagnosis or symptoms are not improving or getting worse.     Of note her labs were normal which is reassuring.     - Basic metabolic panel  (Ca, Cl, CO2, Creat, Gluc, K, Na, BUN); Future  - CBC with platelets and differential; Future  - ESR: Erythrocyte sedimentation rate; Future  - CRP, inflammation; Future  - EKG  12-lead complete w/read - Clinics  - meclizine (ANTIVERT) 25 MG tablet; Take 1 tablet (25 mg) by mouth 3 times daily as needed for dizziness  - [facility]; Future  - CRP, inflammation  - ESR: Erythrocyte sedimentation rate  - CBC with platelets and differential  - Basic metabolic panel  (Ca, Cl, CO2, Creat, Gluc, K, Na, BUN)    Diabetes mellitus with peripheral vascular disease (H)  A1c looks great.   - HEMOGLOBIN A1C; Future  - HEMOGLOBIN A1C    Return in about 2 days (around 9/17/2021) for If not improving, sooner if worse or new concerns.    Options for treatment and follow-up care were reviewed with the patient and/or guardian. Patient and/or guardian engaged in the decision making process and verbalized understanding of the options discussed and agreed with the final plan.    SAILAJA Maynard [facility] LALITA Roque is a 81 year old who presents for the following health issues  accompanied by her daughter April :    History of Present Illness       Hypertension: She presents for follow up of hypertension.  She does not check blood pressure  regularly outside of the clinic. Outpatient blood pressures have not been over 140/90. She follows a low salt diet.     She eats 0-1 servings of fruits and vegetables daily.She consumes 0 sweetened beverage(s) daily.She exercises with enough effort to increase her heart rate 9 or less minutes per day.  She exercises with enough effort to increase her heart rate 3 or less days per week.   She is taking medications regularly.       Dizziness  Onset/Duration: Friday   Description:   Do you feel faint: no  Does it feel like the surroundings (bed, room) are moving: YES  Unsteady/off balance: YES  Have you passed out or fallen: no  Intensity: mild, moderate  Progression of Symptoms: intermittent  Accompanying Signs & Symptoms:  Heart palpitations or chest pain: no  Nausea, vomiting: YES  Weakness or lack of coordination in arms  or legs: no  Vision or speech changes: no  Numbness or tingling: no  Ringing in ears (Tinnitus): YES- right  Hearing Loss: no  History:   Head trauma/concussion history: no  Previous similar symptoms: no  Recent bleeding history: no  Any new medications (BP?): no  Precipitating factors:   Worse with activity: YES  Worse with head movement: YES  Alleviating factors:   Does staying in a fixed position give relief: YES  Therapies tried and outcome: None    Still struggling with the dizziness - see prior note from the previous day for more information and timeline.   She has no significant new symptoms. BPs have been stable and normal since Friday when she had the very high readings that did normalize after a while and taking her BP medications in the evening.     Review of Systems   Constitutional, HEENT, cardiovascular, pulmonary, GI, , musculoskeletal, neuro, skin, endocrine systems are negative, except as otherwise noted.      Objective    /72   Pulse 60   Temp 97.6  F (36.4  C) (Temporal)   Resp 18   Wt 93.9 kg (207 lb)   LMP  (LMP Unknown)   SpO2 97%   BMI 34.49 kg/m    Body mass index is 34.49 kg/m .  Physical Exam   GENERAL: alert and no distress  EYES: Eyes grossly normal to inspection, PERRL, EOMI, conjunctivae and sclerae normal and nystagmus mild noted with lateral motion  NECK: no adenopathy, no asymmetry, masses, or scars, thyroid normal to palpation and no carotid bruits  RESP: lungs clear to auscultation - no rales, rhonchi or wheezes  CV: regular rate and rhythm, normal S1 S2, no S3 or S4, no murmur, click or rub, no peripheral edema and peripheral pulses strong  MS: no gross musculoskeletal defects noted, no edema  SKIN: no suspicious lesions or rashes  NEURO: Normal strength and tone, sensory exam grossly normal, mentation intact, speech normal, cranial nerves 2-12 intact and gait slowed but otherwise normal. [place]-Hallpike on left no symptoms until going from lying to sitting.  [place]  Hallpike on the right very symptomatic immediately unable to determine if any nystagmus present given patient needing to close eyes to feel steady.   PSYCH: mentation appears normal and affect normal/bright    Results for orders placed or performed in visit on 09/15/21   CRP, inflammation     Status: Normal   Result Value Ref Range    CRP Inflammation <2.9 0.0 - 8.0 mg/L   ESR: Erythrocyte sedimentation rate     Status: Normal   Result Value Ref Range    Erythrocyte Sedimentation Rate 5 0 - 30 mm/hr   Basic metabolic panel  (Ca, Cl, CO2, Creat, Gluc, K, Na, BUN)     Status: Abnormal   Result Value Ref Range    Sodium 141 133 - 144 mmol/L    Potassium 4.1 3.4 - 5.3 mmol/L    Chloride 111 (H) 94 - 109 mmol/L    Carbon Dioxide (CO2) 27 20 - 32 mmol/L    Anion Gap 3 3 - 14 mmol/L    Urea Nitrogen 14 7 - 30 mg/dL    Creatinine 1.00 0.52 - 1.04 mg/dL    Calcium 9.0 8.5 - 10.1 mg/dL    Glucose 100 (H) 70 - 99 mg/dL    GFR Estimate 54 (L) >60 mL/min/1.73m2   HEMOGLOBIN A1C     Status: Abnormal   Result Value Ref Range    Hemoglobin A1C 5.8 (H) 0.0 - 5.6 %   CBC with platelets and differential     Status: None   Result Value Ref Range    WBC Count 6.0 4.0 - 11.0 10e3/uL    RBC Count 4.45 3.80 - 5.20 10e6/uL    Hemoglobin 13.6 11.7 - 15.7 g/dL    Hematocrit 41.8 35.0 - 47.0 %    MCV 94 78 - 100 fL    MCH 30.6 26.5 - 33.0 pg    MCHC 32.5 31.5 - 36.5 g/dL    RDW 12.6 10.0 - 15.0 %    Platelet Count 202 150 - 450 10e3/uL    % Neutrophils 56 %    % Lymphocytes 31 %    % Monocytes 10 %    % Eosinophils 3 %    % Basophils 0 %    Absolute Neutrophils 3.4 1.6 - 8.3 10e3/uL    Absolute Lymphocytes 1.9 0.8 - 5.3 10e3/uL    Absolute Monocytes 0.6 0.0 - 1.3 10e3/uL    Absolute Eosinophils 0.2 0.0 - 0.7 10e3/uL    Absolute Basophils 0.0 0.0 - 0.2 10e3/uL   CBC with platelets and differential     Status: None    Narrative    The following orders were created for panel order CBC with platelets and differential.  Procedure                                Abnormality         Status                     ---------                               -----------         ------                     CBC with platelets and d...[[id number]]                      Final result                 Please view results for these tests on the individual orders.

## 2021-09-15 NOTE — PATIENT INSTRUCTIONS
- ER if severe dizziness, vision changes, numbness/tingling, speech concerns, headaches, persistently high BP.   - Look at the Epley Maneuver  - I'll work on getting you into physical therapy.   - Meclizine to take as needed for dizziness.

## 2021-09-17 ENCOUNTER — HOSPITAL ENCOUNTER (OUTPATIENT)
Dept: PHYSICAL THERAPY | Facility: CLINIC | Age: 81
Setting detail: THERAPIES SERIES
End: 2021-09-17
Attending: PHYSICIAN ASSISTANT
Payer: COMMERCIAL

## 2021-09-17 DIAGNOSIS — R42 DIZZINESS: ICD-10-CM

## 2021-09-17 PROCEDURE — 97112 NEUROMUSCULAR REEDUCATION: CPT | Mod: GP | Performed by: PHYSICAL THERAPIST

## 2021-09-17 PROCEDURE — 97161 PT EVAL LOW COMPLEX 20 MIN: CPT | Mod: GP | Performed by: PHYSICAL THERAPIST

## 2021-09-17 NOTE — PROGRESS NOTES
"                                                                                              [facility]        OUTPATIENT PHYSICAL THERAPY FUNCTIONAL EVALUATION  PLAN OF TREATMENT FOR OUTPATIENT REHABILITATION  (COMPLETE FOR INITIAL CLAIMS ONLY)  Patient's Last Name, First Name, M.I.  YOB: 1940  Mya Hui     Provider's Name   [facility]   Medical Record No.  [id number]     Start of Care Date:  09/17/21   Onset Date:  09/10/21   Type:     _X__PT   ____OT  ____SLP Medical Diagnosis:  dizziness     PT Diagnosis:  cervicogenic dizziness Visits from SOC:  1                              __________________________________________________________________________________  Plan of Treatment/Functional Goals:  neuromuscular re-education, manual therapy           GOALS  walking in supermarket  \"Delmi\" (Mya) will be able to walk in supermarket with cart without dizziness.  10/29/21    stand from chair  Delmi will be able to stand from chair or bed without dizziness.  10/29/21             Therapy Frequency:  1 time/week   Predicted Duration of Therapy Intervention:  6 weeks    Karen Da Silva PT                                    I CERTIFY THE NEED FOR THESE SERVICES FURNISHED UNDER        THIS PLAN OF TREATMENT AND WHILE UNDER MY CARE     (Physician co-signature of this document indicates review and certification of the therapy plan).                Certification Date From:  09/17/21   Certification Date To:  11/12/21    Referring Provider:  Ray Castrejon PA-C    Initial Assessment  See Epic Evaluation- Start of Care Date: 09/17/21           "

## 2021-09-17 NOTE — PROGRESS NOTES
09/17/21 1345   Quick Adds   Quick Adds Vestibular Eval;Certification   Type of Visit Initial OP PT Evaluation   General Information   Start of Care Date 09/17/21   Referring Physician Ray Castrejon PA-C   Orders Evaluate and Treat as Indicated   Order Date 09/15/21   Medical Diagnosis dizziness   Onset of illness/injury or Date of Surgery 09/10/21   Precautions/Limitations no known precautions/limitations   Surgical/Medical history reviewed Yes   Pertinent history of current vestibular problem (include personal factors and/or comorbidities that impact the POC)  Migraines;Motion sickness  (migraines were controlled with meds & now nothing)   Pertinent history of current problem (include personal factors and/or comorbidities that impact the POC) One week ago today she started having dizziness when getting up from chairs (watching TV) in the afternoon or bed in middle of night.  She was nauseous but never had emesis.  She has bladder difficulties, gets up twice a night to go to the bathroom, needs to sit on EOB a little while before getting up (under 30 sec.)   It has slowly gotten better.  It may have helped when Ray did testing (HPD)--sat up dizzy but maybe a little better since.  Over the past 2 days her normal pattern of dizziness is: rolled in bed to grab phone first thing in morning and it really set her off for the day.  Showering was more difficult.  As taking pills and havign breakfast--it goes away.  Once sitting a while, it will hit again when get up from chair.  Dizziness is a lightheaded sensation.  She feels lightheaded just sitting here during eval now, holding head upright without neck support--feels better in recliner with head supported.  Once in bed at night it doesn't bother her until she gets up to use the bathroom.  No problem sleeping or rolling over (but doesn't roll much--just sleeeps on right side and stays there--eveything on that side of bed.  Pt reports one time her BP had gone high  when she had the dizziness and it was better as the BP normalized (from taking meds.)    Pertinent Visual History  glasses--lined trifocals   Prior level of functional mobility Ambulation   Ambulation uses rollator walker   Prior level of function comment lives in apt bldg on 2nd floor, elevator does not bother her.    Patient role/Employment history Retired   Living environment Apartment/condo   Current Assistive Devices Four Wheeled Walker   Patient/Family Goals Statement get rid of dizziness to start walking (on walks)   Fall Risk Screen   Fall screen completed by PT   Have you fallen 2 or more times in the past year? No   Have you fallen and had an injury in the past year? No   Is patient a fall risk? No   Fall screen comments no falls for 2-3 years   Abuse Screen (yes response referral indicated)   Feels Unsafe at Home or Work/School no   Feels Threatened by Someone no   Does Anyone Try to Keep You From Having Contact with Others or Doing Things Outside Your Home? no   Physical Signs of Abuse Present no   Pain   Patient currently in pain No   Vitals Signs   Heart Rate 58   SpO2 96   Blood Pressure 140/84   Vital Signs Comments above done in sitting (after sitting about half hour).  STANDING; 136/80, 56 bpm.     Cognitive Status Examination   Orientation orientation to person, place and time   Level of Consciousness alert   Follows Commands and Answers Questions 100% of the time   Personal Safety and Judgment intact   Memory intact   Observation   Observation foward head, kyphotic   Oculomotor Exam   Smooth Pursuit Normal   Saccades Normal   VOR Normal   VOR Cancellation Normal   Rapid Head Thrust Normal   Convergence Testing Comments gets nauseous and needs to stop occulomotor testing--able to get within 2-3 inches of bridge of nose, never doubles and eyes remain converged.  Nauseousness not resolving after 1 minute.  Goggles removed and she starts to feel better,     Other Oculomotor Exam Comments  cover-uncover normal bilat.  Cover-cross cover mild medial shift bilat 50%+ reps.    Infrared Goggle Exam or Frenzel Lense Exam   Vestibular Suppressant in Last 24 Hours? No   Exam completed with Room Light  (goggles for occulomotor then pt nauseous, refuses to use )   Spontaneous Nystagmus Negative   Gaze Evoked Nystagmus Negative   Head Shake Horizontal Nystagmus Negative   Head Shake Horizontal Nystagmus comments but feels she may get nauseous and more cerivcal/ooculomotor work brings on more nauseousness   [place]-Hallpike (Left) Negative   [place]-Hallpike (left) comments feels nauseous sitting up--much cerv tension   [facility] Supine Roll Test (Right) Negative   [facility] Supine Roll Test (Right) Comments but beginning to be a little more nauseous   [facility] Supine Roll Test (Left) Negative   [facility] Supine Roll Test (Left) Comments  less nauseousness when cued to breathe sitting up--less neck tension noted and tolerates better   Planned Therapy Interventions   Planned Therapy Interventions neuromuscular re-education;manual therapy   Clinical Impression   Criteria for Skilled Therapeutic Interventions Met yes, treatment indicated   PT Diagnosis cervicogenic dizziness   Influenced by the following impairments mm spasm/tension, poor cerv stability, poor postural usage   Functional limitations due to impairments reading, watching TV, transfers, walking in busy environment   Clinical Presentation Stable/Uncomplicated   Clinical Decision Making (Complexity) Low complexity   Therapy Frequency 1 time/week   Predicted Duration of Therapy Intervention (days/wks) 6 weeks   Risk & Benefits of therapy have been explained Yes   Patient, Family & other staff in agreement with plan of care Yes   Clinical Impression Comments cervicogenic dizziness with possible BP changes affecting at times( not observed at eval today.)  Severe mm tension in neck--overuse neck mm/poor postures   Education Assessment   Preferred Learning Style Listening;Reading  "  Barriers to Learning No barriers   GOALS   PT Eval Goals 1;2   Goal 1   Goal Identifier walking in supermarket   Goal Description \"Delmi\" (Mya) will be able to walk in supermarket with cart without dizziness.   Target Date 10/29/21   Goal 2   Goal Identifier stand from chair   Goal Description Delmi will be able to stand from chair or bed without dizziness.   Target Date 10/29/21   Total Evaluation Time   PT Millie, Low Complexity Minutes (82904) 45   Therapy Certification   Certification date from 09/17/21   Certification date to 11/12/21   Medical Diagnosis dizziness   Certification I certify the need for these services furnished under this plan of treatment and while under my care.  (Physician co-signature of this document indicates review and certification of the therapy plan).     "

## 2021-09-20 ENCOUNTER — HOSPITAL ENCOUNTER (OUTPATIENT)
Dept: PHYSICAL THERAPY | Facility: CLINIC | Age: 81
Setting detail: THERAPIES SERIES
End: 2021-09-20
Attending: PHYSICIAN ASSISTANT
Payer: COMMERCIAL

## 2021-09-20 PROCEDURE — 97110 THERAPEUTIC EXERCISES: CPT | Mod: GP | Performed by: PHYSICAL THERAPIST

## 2021-09-20 PROCEDURE — 97530 THERAPEUTIC ACTIVITIES: CPT | Mod: GP | Performed by: PHYSICAL THERAPIST

## 2021-09-27 DIAGNOSIS — I10 ESSENTIAL HYPERTENSION WITH GOAL BLOOD PRESSURE LESS THAN 140/90: ICD-10-CM

## 2021-09-28 ENCOUNTER — HOSPITAL ENCOUNTER (OUTPATIENT)
Dept: PHYSICAL THERAPY | Facility: CLINIC | Age: 81
Setting detail: THERAPIES SERIES
End: 2021-09-28
Attending: PHYSICIAN ASSISTANT
Payer: COMMERCIAL

## 2021-09-28 PROCEDURE — 97140 MANUAL THERAPY 1/> REGIONS: CPT | Mod: GP | Performed by: PHYSICAL THERAPIST

## 2021-09-28 PROCEDURE — 97112 NEUROMUSCULAR REEDUCATION: CPT | Mod: GP | Performed by: PHYSICAL THERAPIST

## 2021-09-28 PROCEDURE — 97110 THERAPEUTIC EXERCISES: CPT | Mod: GP | Performed by: PHYSICAL THERAPIST

## 2021-09-30 RX ORDER — DILTIAZEM HYDROCHLORIDE 120 MG/1
120 CAPSULE, COATED, EXTENDED RELEASE ORAL 2 TIMES DAILY
Qty: 180 CAPSULE | Refills: 1 | Status: SHIPPED | OUTPATIENT
Start: 2021-09-30 | End: 2022-03-31

## 2021-10-04 ENCOUNTER — HOSPITAL ENCOUNTER (OUTPATIENT)
Dept: PHYSICAL THERAPY | Facility: CLINIC | Age: 81
Setting detail: THERAPIES SERIES
End: 2021-10-04
Attending: PHYSICIAN ASSISTANT
Payer: COMMERCIAL

## 2021-10-04 PROCEDURE — 97140 MANUAL THERAPY 1/> REGIONS: CPT | Mod: GP | Performed by: PHYSICAL THERAPIST

## 2021-10-04 PROCEDURE — 97110 THERAPEUTIC EXERCISES: CPT | Mod: GP | Performed by: PHYSICAL THERAPIST

## 2021-10-11 ENCOUNTER — HOSPITAL ENCOUNTER (OUTPATIENT)
Dept: PHYSICAL THERAPY | Facility: CLINIC | Age: 81
Setting detail: THERAPIES SERIES
End: 2021-10-11
Attending: PHYSICIAN ASSISTANT
Payer: COMMERCIAL

## 2021-10-11 PROCEDURE — 97530 THERAPEUTIC ACTIVITIES: CPT | Mod: GP | Performed by: PHYSICAL THERAPIST

## 2021-10-11 PROCEDURE — 97140 MANUAL THERAPY 1/> REGIONS: CPT | Mod: GP | Performed by: PHYSICAL THERAPIST

## 2021-10-18 DIAGNOSIS — E11.9 TYPE 2 DIABETES MELLITUS WITHOUT COMPLICATION, WITHOUT LONG-TERM CURRENT USE OF INSULIN (H): ICD-10-CM

## 2021-10-19 NOTE — TELEPHONE ENCOUNTER
Prescription approved per [facility] Refill Protocol.  JOSE R PenaN, RN, PHN  [place]  October 19, 2021

## 2021-10-20 DIAGNOSIS — E11.9 TYPE 2 DIABETES MELLITUS WITHOUT COMPLICATION, WITHOUT LONG-TERM CURRENT USE OF INSULIN (H): Primary | ICD-10-CM

## 2021-10-20 NOTE — TELEPHONE ENCOUNTER
New order, RN is unable to fill. PCP or covering provider please review pended strips.     JOSE R PenaN, RN, PHN  [place]  October 20, 2021

## 2021-10-20 NOTE — TELEPHONE ENCOUNTER
blood glucose (ACCU-CHEK COMPACT PLUS) test strip    Pharmacy message: This strip is no longer available. Change to ACCU-CHEK GUIDE?    [facility] [place]

## 2021-10-23 ENCOUNTER — NURSE TRIAGE (OUTPATIENT)
Dept: NURSING | Facility: CLINIC | Age: 81
End: 2021-10-23

## 2021-10-23 NOTE — TELEPHONE ENCOUNTER
She called her pharmacy for refill of Levothyroxine. Her bottle says she has 1 refill left, but the pharmacy states that she doesn't. She has about a week remaining in her current bottle.   Per chart review: it was last ordered 1/29/2021 #90 with 3 refills. She should have 1 refill remaining.   Pharmacy mark tomorrow @ 10am   Contacted pharmacy and message left for refill request.     Teresita Shipman RN Triage Nurse Advisor 4:42 PM 10/23/2021      Reason for Disposition    Caller requesting a refill, no triage required, and triager able to refill per unit policy    Additional Information    Negative: Drug overdose and triager unable to answer question    Negative: Caller requesting information unrelated to medicine    Negative: Caller requesting a prescription for Strep throat and has a positive culture result    Negative: Rash while taking a medication or within 3 days of stopping it    Negative: Immunization reaction suspected    Negative: [1] Asthma and [2] having symptoms of asthma (cough, wheezing, etc.)    Negative: [1] Influenza symptoms AND [2] anti-viral med prescription request, such as Tamiflu    Negative: [1] Symptom of illness (e.g., headache, abdominal pain, earache, vomiting) AND [2] more than mild    Negative: MORE THAN A DOUBLE DOSE of a prescription or over-the-counter (OTC) drug    Negative: [1] DOUBLE DOSE (an extra dose or lesser amount) of over-the-counter (OTC) drug AND [2] any symptoms (e.g., dizziness, nausea, pain, sleepiness)    Negative: [1] DOUBLE DOSE (an extra dose or lesser amount) of prescription drug AND [2] any symptoms (e.g., dizziness, nausea, pain, sleepiness)    Negative: Took another person's prescription drug    Negative: [1] DOUBLE DOSE (an extra dose or lesser amount) of prescription drug AND [2] NO symptoms (Exception: a double dose of antibiotics)    Negative: Diabetes drug error or overdose (e.g., took wrong type of insulin or took extra dose)    Negative: [1] Request  "for URGENT new prescription or refill of \"essential\" medication (i.e., likelihood of harm to patient if not taken) AND [2] triager unable to fill per unit policy    Negative: [1] Prescription not at pharmacy AND [2] was prescribed by PCP recently    Negative: [1] Pharmacy calling with prescription questions AND [2] triager unable to answer question    Negative: [1] Caller has URGENT medication question about med that PCP or specialist prescribed AND [2] triager unable to answer question    Negative: [1] Caller has NON-URGENT medication question about med that PCP prescribed AND [2] triager unable to answer question    Negative: [1] Caller requesting a NON-URGENT new prescription or refill AND [2] triager unable to refill per unit policy    Negative: [1] Caller has medication question about med not prescribed by PCP AND [2] triager unable to answer question (e.g., compatibility with other med, storage)    Negative: Caller requesting a CONTROLLED substance prescription refill (e.g., narcotics, ADHD medicines)    Negative: Caller wants to use a complementary or alternative medicine    Negative: [1] Prescription prescribed recently is not at pharmacy AND [2] triager has access to patient's EMR AND [3] prescription is recorded in the EMR    Negative: [1] DOUBLE DOSE (an extra dose or lesser amount) of over-the-counter (OTC) drug AND [2] NO symptoms    Negative: [1] DOUBLE DOSE (an extra dose or lesser amount) of antibiotic drug AND [2] NO symptoms    Negative: Caller has medication question only, adult not sick, and triager answers question    Negative: Caller has medication question, adult has minor symptoms, caller declines triage, AND triager answers question    Negative: Caller requesting information about medication during pregnancy; adult is not ill AND triager answers question    Negative: Caller requesting information about medication use with breastfeeding; neither adult nor infant is ill, triager answers " question    Protocols used: MEDICATION QUESTION CALL-A-AH

## 2021-10-26 DIAGNOSIS — E03.9 HYPOTHYROIDISM, UNSPECIFIED TYPE: ICD-10-CM

## 2021-10-26 RX ORDER — LEVOTHYROXINE SODIUM 75 UG/1
75 TABLET ORAL DAILY
Qty: 90 TABLET | Refills: 0 | Status: SHIPPED | OUTPATIENT
Start: 2021-10-26 | End: 2022-04-21

## 2021-10-26 NOTE — TELEPHONE ENCOUNTER
Prescription approved per [facility] Refill Protocol.  JOSE R PenaN, RN, PHN  [place]  October 26, 2021

## 2021-10-27 NOTE — PROGRESS NOTES
"Outpatient Physical Therapy Discharge Note     Patient: Mya Hui  : 1940    Beginning/End Dates of Reporting Period:  2021 to 10/11/2021    Mya has been seen for a total of 5 visits overall.      Therapy Diagnosis: Dizziness     Client Self Report: Is doing better. She has not had any dizziness this past week. She states that she has been able to bend down and pick things off the floor without getting dizzy.     Objective Measurements:   Objective Measure: TUG  Details: 14.47  Objective Measure: Sit to  30 seconds.   Details: 4     Goals:  Goal Identifier walking in supermarket   Goal Description \"Delmi\" (Mya) will be able to walk in supermarket with cart without dizziness.   Target Date 10/29/21   Date Met  10/11/21   Progress (detail required for progress note): denies any problem with this today     Goal Identifier stand from chair   Goal Description Delmi will be able to stand from chair or bed without dizziness.   Target Date 10/29/21   Date Met  10/11/21   Progress (detail required for progress note): Reports that this is much better. has to sit due to incont. vs.  dizziness       Plan:  Discharge from therapy.    Discharge: Yes    Reason for Discharge: Patient has met all goals.    Equipment Issued: None    Discharge Plan: Patient to continue home program.    Thank you for this referral.    Jacki Vigil P.T."

## 2021-11-08 ENCOUNTER — OFFICE VISIT (OUTPATIENT)
Dept: FAMILY MEDICINE | Facility: OTHER | Age: 81
End: 2021-11-08
Payer: COMMERCIAL

## 2021-11-08 VITALS
HEART RATE: 64 BPM | OXYGEN SATURATION: 96 % | DIASTOLIC BLOOD PRESSURE: 70 MMHG | SYSTOLIC BLOOD PRESSURE: 124 MMHG | WEIGHT: 210 LBS | TEMPERATURE: 97.6 F | BODY MASS INDEX: 34.99 KG/M2 | RESPIRATION RATE: 18 BRPM

## 2021-11-08 DIAGNOSIS — R92.8 ABNORMAL MAMMOGRAM: ICD-10-CM

## 2021-11-08 DIAGNOSIS — Z23 HIGH PRIORITY FOR 2019-NCOV VACCINE: ICD-10-CM

## 2021-11-08 DIAGNOSIS — L82.1 SEBORRHEIC KERATOSIS: Primary | ICD-10-CM

## 2021-11-08 PROCEDURE — 90662 IIV NO PRSV INCREASED AG IM: CPT | Performed by: PHYSICIAN ASSISTANT

## 2021-11-08 PROCEDURE — 91306 COVID-19,PF,MODERNA (18+ YRS BOOSTER .25ML): CPT | Performed by: PHYSICIAN ASSISTANT

## 2021-11-08 PROCEDURE — 99213 OFFICE O/P EST LOW 20 MIN: CPT | Mod: 25 | Performed by: PHYSICIAN ASSISTANT

## 2021-11-08 PROCEDURE — 17110 DESTRUCTION B9 LES UP TO 14: CPT | Performed by: PHYSICIAN ASSISTANT

## 2021-11-08 PROCEDURE — G0008 ADMIN INFLUENZA VIRUS VAC: HCPCS | Performed by: PHYSICIAN ASSISTANT

## 2021-11-08 PROCEDURE — 0064A COVID-19,PF,MODERNA (18+ YRS BOOSTER .25ML): CPT | Performed by: PHYSICIAN ASSISTANT

## 2021-11-08 ASSESSMENT — PAIN SCALES - GENERAL: PAINLEVEL: NO PAIN (0)

## 2021-11-08 NOTE — PROGRESS NOTES
Assessment & Plan     ICD-10-CM    1. Seborrheic keratosis  L82.1 DESTRUCT BENIGN LESION, UP TO 14     CANCELED: DESTRUCT PREMALIGNANT LESION, FIRST   2. High priority for 2019-nCoV vaccine  Z23 COVID-19,PF,MODERNA (18+ Yrs BOOSTER .25mL)   3. Abnormal mammogram  R92.8 MA Diagnostic Digital Bilateral     US Breast Right Limited 1-3 Quadrants     1.   - Discussed skin lesion consistent with SK   - Discussed etiology and recommended treatment with cryotherapy including benefits and risks  - Patient consented, procedure as below was well tolerated   - Discussed after care     2. Vaccine given     3.   - Had abnormal mammogram after having some breast pain in 2020, told to have repeat imaging  - Ordered, patient to schedule, will await results      Review of the result(s) of each unique test - See list          5/19/20 - US breast right, diagnostic mammogram   Diagnosis or treatment significantly limited by social determinants of health - None     15 minutes spent on the date of the encounter doing chart review, history and exam, documentation and further activities as noted above    The patient indicates understanding of these issues and agrees with the plan.      SAILAJA Otto [facility]    Wan Roque is a 81 year old who presents for the following health issues     HPI     Warts  Onset/Duration: summer   Description (location/number): wart on hairline  Accompanying signs and symptoms (pain, redness):  no   History: prior warts:  no   Therapies tried and outcome: none      Review of Systems   Constitutional, HEENT, cardiovascular, pulmonary, gi and gu systems are negative, except as otherwise noted.      Objective    /70   Pulse 64   Temp 97.6  F (36.4  C) (Temporal)   Resp 18   Wt 95.3 kg (210 lb)   LMP  (LMP Unknown)   SpO2 96%   BMI 34.99 kg/m    Body mass index is 34.99 kg/m .  Physical Exam   GENERAL APPEARANCE: healthy, alert and no  distress  EYES: Eyes grossly normal to inspection, PERRLA, conjunctivae and sclerae without injection or discharge, EOM intact   RESP: Lungs clear to auscultation - no rales, rhonchi or wheezes    CV: Regular rates and rhythm, normal S1 S2, no S3 or S4, no murmur, click or rub, no peripheral edema and peripheral pulses strong and symmetric bilaterally   MS: No musculoskeletal defects are noted and gait is age appropriate without ataxia   SKIN:   Located on scalp, near hairline, right temple - slightly raised, flesh colored skin lesion, dry appearing, circular, ~5 mm in diameter with regular borders   No suspicious lesions or rashes, hydration status appears adeuqate with normal skin turgor   PSYCH: Alert and oriented x3; speech- coherent , normal rate and volume; able to articulate logical thoughts, able to abstract reason, no tangential thoughts, no hallucinations or delusions, mentation appears normal, Mood is euthymic. Affect is appropriate for this mood state and bright. Thought content is free of suicidal ideation, hallucinations, and delusions. Dress is adequate and upkept. Eye contact is good during conversation.       Diagnostics: See orders pending in Epic       Procedure Note:  Pause for the cause has been completed prior to the prceedure.   1. Patient was identified by both name and date of birth   2. The correct site was identified   3. Site was marked by provider    4. Verbal authorization  to proceed was obtained   5. Verifed necessary supplies, equipment, and diagnostics are available    6. Time out was performed immediately prior to procedure    Objective: The lesion(s) is/are of the above mentioned size and location and is/are typical. Using the usual technique, cryotherapy with liquid nitrogen x 3 was performed.  The site were identified. The liquid nitrogen was held to the lesion using a sterile swab for  2-3 freeze-thaw cycles were given with duration of 2-3 sec each till a frost rim of 1mm is  noticed around the lesion. A total of 1 skin lesions were treated. The procedure was well tolerated and without complications.

## 2021-11-24 ENCOUNTER — HOSPITAL ENCOUNTER (OUTPATIENT)
Dept: ULTRASOUND IMAGING | Facility: CLINIC | Age: 81
End: 2021-11-24
Attending: PHYSICIAN ASSISTANT
Payer: COMMERCIAL

## 2021-11-24 ENCOUNTER — HOSPITAL ENCOUNTER (OUTPATIENT)
Dept: MAMMOGRAPHY | Facility: CLINIC | Age: 81
End: 2021-11-24
Attending: PHYSICIAN ASSISTANT
Payer: COMMERCIAL

## 2021-11-24 DIAGNOSIS — R92.8 ABNORMAL MAMMOGRAM: ICD-10-CM

## 2021-11-24 PROCEDURE — 76642 ULTRASOUND BREAST LIMITED: CPT | Mod: RT

## 2021-11-24 PROCEDURE — 77062 BREAST TOMOSYNTHESIS BI: CPT

## 2021-12-20 DIAGNOSIS — E87.6 HYPOKALEMIA: ICD-10-CM

## 2021-12-22 ENCOUNTER — NURSE TRIAGE (OUTPATIENT)
Dept: NURSING | Facility: CLINIC | Age: 81
End: 2021-12-22
Payer: COMMERCIAL

## 2021-12-22 NOTE — TELEPHONE ENCOUNTER
Patient calling regarding general information/questions for gathering at [place]. Patient states that everyone is fully vaccinated and has their booster vaccine except for 1 person in the group of 10. Patient wants to know if that 1 person should be coming to the gathering or if they should stay away from this person.     RN advised/stated to patient that writer cannot tell patient to see or not to see someone as this is the patients discretion. RN can advise on safety and precautions when gathering such as wearing masks, social distancing, washing hands.     RN stated the 1 person who is still fully vaccinated without a booster yet would still be considered fully vaccinated per CDC below under FAQ's:         Again, RN stated that writer cannot tell patient who to see or not to see, but would advise precautions listed above and also advised if any one is presenting with any sick symptoms and or exposure to COVID-19 would recommended staying at home and getting tested.     RN advised for patient to contact MD for a second opinion and recommendations regarding her questions. RN advised if patient has any other questions or concerns to call back. Patient verbalized understanding and agrees with plan.     Radha Ashraf RN, BSN Nurse Triage Advisor 9:52 AM 12/22/2021       Additional Information    General information question, no triage required and triager able to answer question    Protocols used: INFORMATION ONLY CALL-A-    COVID 19 Nurse Triage Plan/Patient Instructions    Please be aware that novel coronavirus (COVID-19) may be circulating in the community. If you develop symptoms such as fever, cough, or SOB or if you have concerns about the presence of another infection including coronavirus (COVID-19), please contact your health care provider or visit [email or web address].     Disposition/Instructions    Additional COVID19 information to add for patients.   How can I protect others?  If you  "have symptoms (fever, cough, body aches or trouble breathing): Stay home and away from others (self-isolate) until:    At least 10 days have passed since your symptoms started, And     You ve had no fever--and no medicine that reduces fever--for 1 full day (24 hours), And      Your other symptoms have resolved (gotten better).     If you don t have symptoms, but a test showed that you have COVID-19 (you tested positive):    Stay home and away from others (self-isolate). Follow the tips under \"How do I self-isolate?\" below for 10 days (20 days if you have a weak immune system).    You don't need to be retested for COVID-19 before going back to school or work. As long as you're fever-free and feeling better, you can go back to school, work and other activities after waiting the 10 or 20 days.     How do I self-isolate?    Stay in your own room, even for meals. Use your own bathroom if you can.     Stay away from others in your home. No hugging, kissing or shaking hands. No visitors.    Don t go to work, school or anywhere else.     Clean  high touch  surfaces often (doorknobs, counters, handles, etc.). Use a household cleaning spray or wipes. You ll find a full list on the EPA website:  www.epa.gov/pesticide-registration/list-n-disinfectants-use-against-sars-cov-2.    Cover your mouth and nose with a mask, tissue or washcloth to avoid spreading germs.    Wash your hands and face often. Use soap and water.    Caregivers in these groups are at risk for severe illness due to COVID-19:  o People 65 years and older  o People who live in a nursing home or long-term care facility  o People with chronic disease (lung, heart, cancer, diabetes, kidney, liver, immunologic)  o People who have a weakened immune system, including those who:  - Are in cancer treatment  - Take medicine that weakens the immune system, such as corticosteroids  - Had a bone marrow or organ transplant  - Have an immune deficiency  - Have poorly " controlled HIV or AIDS  - Are obese (body mass index of 40 or higher)  - Smoke regularly    Caregivers should wear gloves while washing dishes, handling laundry and cleaning bedrooms and bathrooms.    Use caution when washing and drying laundry: Don t shake dirty laundry, and use the warmest water setting that you can.    For more tips, go to www.cdc.gov/coronavirus/2019-ncov/downloads/10Things.pdf.    How can I take care of myself?  1. Get lots of rest. Drink extra fluids (unless a doctor has told you not to).     2. Take Tylenol (acetaminophen) for fever or pain. If you have liver or kidney problems, ask your family doctor if it s okay to take Tylenol.     Adults can take either:     650 mg (two 325 mg pills) every 4 to 6 hours, or     1,000 mg (two 500 mg pills) every 8 hours as needed.     Note: Don t take more than 3,000 mg in one day.   Acetaminophen is found in many medicines (both prescribed and over-the-counter medicines). Read all labels to be sure you don t take too much.     For children, check the Tylenol bottle for the right dose. The dose is based on the child s age or weight.    3. If you have other health problems (like cancer, heart failure, an organ transplant or severe kidney disease): Call your specialty clinic if you don t feel better in the next 2 days.    4. Know when to call 911: Emergency warning signs include:    Trouble breathing or shortness of breath    Pain or pressure in the chest that doesn t go away    Feeling confused like you haven t felt before, or not being able to wake up    Bluish-colored lips or face    What are the symptoms of COVID-19?     The most common symptoms are cough, fever and trouble breathing.     Less common symptoms include body aches, chills, diarrhea (loose, watery poops), fatigue (feeling very tired), headache, runny nose, sore throat and loss of smell.    COVID-19 can cause severe coughing (bronchitis) and lung infection (pneumonia).    How does it spread?      The virus may spread when a person coughs or sneezes into the air. The virus can travel about 6 feet this way, and it can live on surfaces.      Common  (household disinfectants) will kill the virus.    Who is at risk?  Anyone can catch COVID-19 if they re around someone who has the virus.    How can others protect themselves?     Stay away from people who have COVID-19 (or symptoms of COVID-19).    Wash hands often with soap and water. Or, use hand  with at least 60% alcohol.    Avoid touching the eyes, nose or mouth.     Wear a face mask when you go out in public, when sick or when caring for a sick person.    Where can I get more information?     [address] [place]: About COVID-19: [email or web address]    CDC: What to Do If You re Sick: www.cdc.gov/coronavirus/2019-ncov/about/steps-when-sick.html    CDC: Ending Home Isolation: www.cdc.gov/coronavirus/2019-ncov/hcp/disposition-in-home-patients.html     CDC: Caring for Someone: www.cdc.gov/coronavirus/2019-ncov/if-you-are-sick/care-for-someone.html     [facility]: Interim Guidance for Hospital Discharge to Home: www.[facility].[place].us/diseases/coronavirus/hcp/hospdischarge.pdf    [facility] clinical trials (COVID-19 research studies): clinicalaffairs.[facility]-clinical-trials     Below are the COVID-19 hotlines at the [place] Department of Health ([facility]). Interpreters are available.   o For health questions: Call 540-183-2424 or 1-206.928.1022 (7 a.m. to 7 p.m.)  o For questions about schools and childcare: Call 937-490-4123 or 1-166.579.9810 (7 a.m. to 7 p.m.)          Thank you for taking steps to prevent the spread of this virus.  o Limit your contact with others.  o Wear a simple mask to cover your cough.  o Wash your hands well and often.    Resources    [facility] [place]: About COVID-19: [email or web address]    CDC: What to Do If You're Sick:  www.cdc.gov/coronavirus/2019-ncov/about/steps-when-sick.html    CDC: Ending Home Isolation: www.cdc.gov/coronavirus/2019-ncov/hcp/disposition-in-home-patients.html     CDC: Caring for Someone: www.cdc.gov/coronavirus/2019-ncov/if-you-are-sick/care-for-someone.html     [facility]: Interim Guidance for Hospital Discharge to Home: www.[facility].mn./diseases/coronavirus/hcp/hospdischarge.pdf    [facility] clinical trials (COVID-19 research studies): clinicalaffairs.[facility]-clinical-trials     Below are the COVID-19 hotlines at the [place] Department of Health ([facility]). Interpreters are available.   o For health questions: Call 951-160-9662 or 1-423.181.8441 (7 a.m. to 7 p.m.)  o For questions about schools and childcare: Call 510-748-4137 or 1-423.958.8217 (7 a.m. to 7 p.m.)

## 2021-12-23 DIAGNOSIS — I10 ESSENTIAL HYPERTENSION WITH GOAL BLOOD PRESSURE LESS THAN 140/90: ICD-10-CM

## 2021-12-23 RX ORDER — LISINOPRIL 40 MG/1
TABLET ORAL
Qty: 90 TABLET | Refills: 2 | Status: SHIPPED | OUTPATIENT
Start: 2021-12-23 | End: 2022-09-29

## 2021-12-23 RX ORDER — POTASSIUM CHLORIDE 750 MG/1
TABLET, EXTENDED RELEASE ORAL
Qty: 180 TABLET | Refills: 0 | Status: SHIPPED | OUTPATIENT
Start: 2021-12-23 | End: 2022-02-15

## 2022-01-12 ENCOUNTER — NURSE TRIAGE (OUTPATIENT)
Dept: FAMILY MEDICINE | Facility: OTHER | Age: 82
End: 2022-01-12
Payer: COMMERCIAL

## 2022-01-12 ENCOUNTER — APPOINTMENT (OUTPATIENT)
Dept: CT IMAGING | Facility: CLINIC | Age: 82
End: 2022-01-12
Attending: NURSE PRACTITIONER
Payer: COMMERCIAL

## 2022-01-12 ENCOUNTER — HOSPITAL ENCOUNTER (EMERGENCY)
Facility: CLINIC | Age: 82
Discharge: HOME | End: 2022-01-12
Attending: NURSE PRACTITIONER | Admitting: NURSE PRACTITIONER
Payer: COMMERCIAL

## 2022-01-12 VITALS
OXYGEN SATURATION: 99 % | DIASTOLIC BLOOD PRESSURE: 110 MMHG | TEMPERATURE: 98.6 F | SYSTOLIC BLOOD PRESSURE: 168 MMHG | BODY MASS INDEX: 34.99 KG/M2 | HEART RATE: 82 BPM | RESPIRATION RATE: 16 BRPM | WEIGHT: 210 LBS

## 2022-01-12 DIAGNOSIS — N20.1 LEFT URETERAL STONE: ICD-10-CM

## 2022-01-12 LAB
ALBUMIN SERPL-MCNC: 3.7 G/DL (ref 3.4–5)
ALBUMIN UR-MCNC: NEGATIVE MG/DL
ALP SERPL-CCNC: 71 U/L (ref 40–150)
ALT SERPL W P-5'-P-CCNC: 35 U/L (ref 0–50)
ANION GAP SERPL CALCULATED.3IONS-SCNC: 4 MMOL/L (ref 3–14)
APPEARANCE UR: CLEAR
AST SERPL W P-5'-P-CCNC: 27 U/L (ref 0–45)
BACTERIA #/AREA URNS HPF: ABNORMAL /HPF
BASOPHILS # BLD AUTO: 0 10E3/UL (ref 0–0.2)
BASOPHILS NFR BLD AUTO: 1 %
BILIRUB SERPL-MCNC: 0.4 MG/DL (ref 0.2–1.3)
BILIRUB UR QL STRIP: NEGATIVE
BUN SERPL-MCNC: 23 MG/DL (ref 7–30)
CALCIUM SERPL-MCNC: 9.2 MG/DL (ref 8.5–10.1)
CHLORIDE BLD-SCNC: 113 MMOL/L (ref 94–109)
CO2 SERPL-SCNC: 23 MMOL/L (ref 20–32)
COLOR UR AUTO: YELLOW
CREAT SERPL-MCNC: 1.35 MG/DL (ref 0.52–1.04)
EOSINOPHIL # BLD AUTO: 0.1 10E3/UL (ref 0–0.7)
EOSINOPHIL NFR BLD AUTO: 2 %
ERYTHROCYTE [DISTWIDTH] IN BLOOD BY AUTOMATED COUNT: 12.2 % (ref 10–15)
GFR SERPL CREATININE-BSD FRML MDRD: 40 ML/MIN/1.73M2
GLUCOSE BLD-MCNC: 115 MG/DL (ref 70–99)
GLUCOSE UR STRIP-MCNC: NEGATIVE MG/DL
HCT VFR BLD AUTO: 41.3 % (ref 35–47)
HGB BLD-MCNC: 13.7 G/DL (ref 11.7–15.7)
HGB UR QL STRIP: ABNORMAL
IMM GRANULOCYTES # BLD: 0 10E3/UL
IMM GRANULOCYTES NFR BLD: 0 %
KETONES UR STRIP-MCNC: NEGATIVE MG/DL
LEUKOCYTE ESTERASE UR QL STRIP: NEGATIVE
LYMPHOCYTES # BLD AUTO: 1.5 10E3/UL (ref 0.8–5.3)
LYMPHOCYTES NFR BLD AUTO: 20 %
MCH RBC QN AUTO: 30.9 PG (ref 26.5–33)
MCHC RBC AUTO-ENTMCNC: 33.2 G/DL (ref 31.5–36.5)
MCV RBC AUTO: 93 FL (ref 78–100)
MONOCYTES # BLD AUTO: 0.6 10E3/UL (ref 0–1.3)
MONOCYTES NFR BLD AUTO: 8 %
MUCOUS THREADS #/AREA URNS LPF: PRESENT /LPF
NEUTROPHILS # BLD AUTO: 5.3 10E3/UL (ref 1.6–8.3)
NEUTROPHILS NFR BLD AUTO: 69 %
NITRATE UR QL: NEGATIVE
NRBC # BLD AUTO: 0 10E3/UL
NRBC BLD AUTO-RTO: 0 /100
PH UR STRIP: 5 [PH] (ref 5–7)
PLATELET # BLD AUTO: 184 10E3/UL (ref 150–450)
POTASSIUM BLD-SCNC: 4.3 MMOL/L (ref 3.4–5.3)
PROT SERPL-MCNC: 7.4 G/DL (ref 6.8–8.8)
RBC # BLD AUTO: 4.43 10E6/UL (ref 3.8–5.2)
RBC URINE: 21 /HPF
SODIUM SERPL-SCNC: 140 MMOL/L (ref 133–144)
SP GR UR STRIP: 1.01 (ref 1–1.03)
SQUAMOUS EPITHELIAL: 3 /HPF
UROBILINOGEN UR STRIP-MCNC: NORMAL MG/DL
WBC # BLD AUTO: 7.5 10E3/UL (ref 4–11)
WBC URINE: 1 /HPF

## 2022-01-12 PROCEDURE — 36415 COLL VENOUS BLD VENIPUNCTURE: CPT | Performed by: NURSE PRACTITIONER

## 2022-01-12 PROCEDURE — 85025 COMPLETE CBC W/AUTO DIFF WBC: CPT | Performed by: NURSE PRACTITIONER

## 2022-01-12 PROCEDURE — 99284 EMERGENCY DEPT VISIT MOD MDM: CPT | Performed by: NURSE PRACTITIONER

## 2022-01-12 PROCEDURE — 74176 CT ABD & PELVIS W/O CONTRAST: CPT

## 2022-01-12 PROCEDURE — 81003 URINALYSIS AUTO W/O SCOPE: CPT | Performed by: NURSE PRACTITIONER

## 2022-01-12 PROCEDURE — 80053 COMPREHEN METABOLIC PANEL: CPT | Performed by: NURSE PRACTITIONER

## 2022-01-12 PROCEDURE — 99284 EMERGENCY DEPT VISIT MOD MDM: CPT | Mod: 25

## 2022-01-12 ASSESSMENT — ENCOUNTER SYMPTOMS
CHILLS: 0
DIFFICULTY URINATING: 0
VOMITING: 0
FLANK PAIN: 1
CONSTIPATION: 0
NAUSEA: 1
SHORTNESS OF BREATH: 0
FEVER: 0
DYSURIA: 0
FREQUENCY: 0
BLOOD IN STOOL: 0
ABDOMINAL PAIN: 0
DIZZINESS: 0
DIARRHEA: 0
HEMATURIA: 0
LIGHT-HEADEDNESS: 1
COUGH: 0

## 2022-01-12 NOTE — DISCHARGE INSTRUCTIONS
Drink plenty of fluids.  Tylenol 650 mg every 4-6 hours as needed for pain.  Ibuprofen 400-600 mg every 6-8 hours as needed for pain  (take with food, stop if causing stomach pains.)  You should have recheck with Urology- Referral sent ([facility])    Call--[phone]    Return for fevers, vomiting, not tolerating the pain, or worse in any way.

## 2022-01-12 NOTE — TELEPHONE ENCOUNTER
"Pt calls and states that she is having left sided abdominal pain. States that it has been going on for 3 days. Today the pain started at 3am and hasn't gone away. Pt rates pain at 8/10. States pain is below her left rib cage. Pt states that pain \"pretty much stays on the side\". Last BM was today. It was normal. No changes to her diet. No rebound tenderness. Pt states that she is nauseous, and feels like she could vomit, but hasn't. Pain does not radiate. Pt states that she is also feeling a little lightheaded. Pain has been constant since 3 am. Pain has been coming and going for the last few days. No chest pain. No difficulty breathing. No fever. Pt states that she is having trouble sitting or laying down. States that nothing is relieving the pain. Pt states she couldn't even take her medication this morning.    Per protocol, recommended that pt go to the ER. Due to lightheadedness, and pt verbalizing that she doesn't feel she can drive, recommended having someone to drive her. Pt states that she has a couple of grandchildren that aren't working and could take her. Recommended bringing medication list and medications that she hasn't taken yet. [facility] to [facility]. Pt verbalizes understanding and is in agreement with plan. Denies further questions.     Sendy Choi, JOSE RN, RN, PHN  Registered Nurse-Clinic Triage  [facility]  1/12/2022 at 10:34 AM       Reason for Disposition    Pain lasting > 10 minutes and over 50 years old    Additional Information    Negative: Passed out (i.e., fainted, collapsed and was not responding)    Negative: Shock suspected (e.g., cold/pale/clammy skin, too weak to stand, low BP, rapid pulse)    Negative: Visible sweat on face or sweat is dripping down    Negative: Chest pain    Negative: SEVERE abdominal pain (e.g., excruciating)    Protocols used: ABDOMINAL PAIN - UPPER-A-OH      "

## 2022-01-12 NOTE — ED PROVIDER NOTES
History     Chief Complaint   Patient presents with     Flank Pain     HPI  Mya Hui is a 81 year old female with history of Htn, T2DM, CKD stage 3, DDD, hypothyroidism, GERD, Bilateral LE edema, overactive bladder, hyperlipidemia, and chronic venous insuffuciency  who present to the emergency department for evaluation of left flank pain. Symptoms started on Saturday, intermittent. Pain became constant since 3 am today and more severe. Continues to stay focal to the left flank. Denies fever or chills. Nauseated but no vomiting. Denies dysuria or hematuria. Denies constipation or diarrhea.   Prior history of kidney stone, many years ago- had lithotripsy and ureteral stent.   Lap meghan in 2018    Colonoscopy 1/31/2029:  Findings:        The perianal and digital rectal examinations were normal.        The terminal ileum appeared normal.        A 4 mm polyp was found in the transverse colon. The polyp was sessile.        The polyp was removed with a cold snare. Resection and retrieval were        complete. Verification of patient identification for the specimen was        done. Estimated blood loss was minimal.        The colon (entire examined portion) appeared normal. Biopsies for        histology were taken with a cold forceps from the right colon and left        colon for evaluation of microscopic colitis. Verification of patient        identification for the specimen was done. Estimated blood loss was        minimal.        There is no endoscopic evidence of erythema, inflammation, mass or        ulcerations in the entire colon.        The retroflexed view of the distal rectum and anal verge was normal and        showed no anal or rectal abnormalities.   Allergies:  Allergies   Allergen Reactions     Codeine Nausea     Fentanyl Nausea and Vomiting     VERY sensitive to most narcotics if not all.       Problem List:    Patient Active Problem List    Diagnosis Date Noted     Impacted cerumen of right ear  01/22/2021     Priority: Medium     Balance problems 01/22/2021     Priority: Medium     Tinnitus, right 01/22/2021     Priority: Medium     Sacroiliitis, not elsewhere classified (H) 01/06/2021     Priority: Medium     Chronic venous insufficiency 11/30/2020     Priority: Medium     Diabetes mellitus with peripheral vascular disease (H) 01/20/2020     Priority: Medium     CKD (chronic kidney disease) stage 3, GFR 30-59 ml/min (H) 01/20/2020     Priority: Medium     Bilateral lower extremity edema 09/24/2019     Priority: Medium     Urinary incontinence, unspecified type 07/17/2019     Priority: Medium     S/P laparoscopic cholecystectomy 04/03/2018     Priority: Medium     Toe anomaly 04/03/2018     Priority: Medium     Cherry angioma 04/03/2018     Priority: Medium     Gastroesophageal reflux disease, esophagitis presence not specified 09/14/2017     Priority: Medium     IMO Regulatory Load OCT 2020       Sliding hiatal hernia 09/11/2017     Priority: Medium     Calculus of gallbladder without cholecystitis 09/11/2017     Priority: Medium     Rotator cuff arthropathy, right 08/28/2017     Priority: Medium     Chronic right shoulder pain 08/10/2017     Priority: Medium     Right shoulder pain, unspecified chronicity 08/10/2017     Priority: Medium     Dry mouth 08/10/2017     Priority: Medium     Primary osteoarthritis involving multiple joints 06/16/2017     Priority: Medium     Cataract, bilateral 03/30/2017     Priority: Medium     Type 2 diabetes mellitus without complication, without long-term current use of insulin (H) 02/16/2017     Priority: Medium     Hypothyroidism, unspecified type 02/14/2017     Priority: Medium     Insomnia, unspecified 02/14/2017     Priority: Medium     OAB (overactive bladder) 09/07/2016     Priority: Medium     Essential hypertension with goal blood pressure less than 140/90 09/07/2016     Priority: Medium     Essential hypertension 02/05/2016     Priority: Medium     Diabetes  mellitus with stage 3 chronic kidney disease (H) 02/05/2016     Priority: Medium     Overview:   5/22/15: Creatinine at [place], [place] 1.17, GFR 45  5/22/15: hgb A1C 6.6, microalbumin 7.92       Migraine without aura and without status migrainosus, not intractable 11/19/2015     Priority: Medium     Gastroesophageal reflux disease without esophagitis 11/19/2015     Priority: Medium     Hyperlipidemia 11/19/2015     Priority: Medium     Overview:   5/22/15: , Trig 165, HDL 47, LDL 68  5/18/16: , Trig 218, HDL 46, LDL55       Hypothyroidism 11/19/2015     Priority: Medium     Overview:   5/22/15: TSH 2.68       Osteoarthritis of hip 08/21/2014     Priority: Medium     Do you wish to do the replacement in the background? yes         DDD (degenerative disc disease), lumbar 08/21/2014     Priority: Medium     Hearing aid worn 05/30/2014     Priority: Medium     Right ear       Hip pain 09/27/2013     Priority: Medium     Sciatica of right side 06/28/2013     Priority: Medium     Asymptomatic carotid artery stenosis 05/02/2013     Priority: Medium     Hyperlipidemia with target LDL less than 100      Priority: Medium     Diagnosis updated by automated process. Provider to review and confirm.       History of renal calculi      Priority: Medium     Nevus 04/10/2013     Priority: Medium     Do you wish to do the replacement in the background? yes         Arthritis      Priority: Medium        Past Medical History:    Past Medical History:   Diagnosis Date     Acute cholecystitis 3/23/2018     Acute kidney injury (H) 9/10/2017     Arthritis      Dehydration 9/4/2017     Diabetes type 2, controlled (H)      Elevated lactic acid level 9/10/2017     GERD (gastroesophageal reflux disease)      History of renal calculi      HTN, goal below 140/90      Hyperlipidaemia LDL goal < 100      Hypokalemia 9/3/2017     Hypothyroid      Insomnia      Left carotid stenosis      Migraine      Motion sickness      PONV  (postoperative nausea and vomiting)      Sciatica of right side 6/28/2013     Type 2 diabetes mellitus without complication, without long-term current use of insulin (H) 2/16/2017       Past Surgical History:    Past Surgical History:   Procedure Laterality Date     BUNIONECTOMY      Right foot     CATARACT IOL, RT/LT Bilateral 2017     COLONOSCOPY  7/12/2013    Procedure: COLONOSCOPY;  Colonoscopy;  Surgeon: Giovany Espinoza MD;  Location: [facility]     COLONOSCOPY N/A 1/31/2019    Procedure: Combined Colonoscopy, Single Or Multiple Biopsy/Polypectomy By Biopsy;  Surgeon: Efren Osorio MD;  Location: [facility]     COLONOSCOPY WITH CO2 INSUFFLATION N/A 1/31/2019    Procedure: COLONOSCOPY WITH CO2 INSUFFLATION;  Surgeon: Efren Osorio MD;  Location: [facility]     FRACTURE TX, ANKLE RT/LT      Left ankle     HC CYSTOURETHROSCOPY W/ URETEROSCOPY &/OR PYELOSCOPY; W/ LITHOTRIPSY       HC REVISE MEDIAN N/CARPAL TUNNEL SURG      Right wrist     INJECT EPIDURAL LUMBAR Right 12/27/2018    Procedure: INJECT EPIDURAL LUMBAR right foraminal narrowing severe at L4-L5 and moderate at L5-S1;  Surgeon: Gilbert Mcclure MD;  Location: [facility]     INJECT EPIDURAL TRANSFORAMINAL Bilateral 5/27/2021    Procedure: Bilateral Lumbar 5-sacral 1 Epidural steroid injection;  Surgeon: Gilbert Mcclure MD;  Location: [facility]     INJECT JOINT SACROILIAC  4/10/2014    Procedure: INJECT JOINT SACROILIAC;  Sacroiliac Joint Injection;  Surgeon: Gilbert Mcclure MD;  Location: [facility]     INJECT JOINT SACROILIAC Left 4/11/2019    Procedure: INJECT JOINT SACROILIAC- LEFT;  Surgeon: Gilbert Mcclure MD;  Location: [facility]     LAPAROSCOPIC CHOLECYSTECTOMY N/A 3/24/2018    Procedure: LAPAROSCOPIC CHOLECYSTECTOMY;  Laparoscopic Cholecystectomy;  Surgeon: Berry Allen DO;  Location: [facility]       Family History:    Family History   Problem Relation Age of Onset     Hypertension Brother      Cerebrovascular Disease Brother      Diabetes  Father      Cardiovascular Father      Diabetes Brother         Borderline     Breast Cancer Mother      Diabetes Brother      Blood Disease Brother      Cardiovascular Brother         MI       Social History:  Marital Status:   [2]  Social History     Tobacco Use     Smoking status: Never Smoker     Smokeless tobacco: Never Used   Vaping Use     Vaping Use: Never used   Substance Use Topics     Alcohol use: Yes     Drug use: No        Medications:    cholestyramine (QUESTRAN) 4 g packet  diltiazem ER COATED BEADS (CARTIA XT) 120 MG 24 hr capsule  levothyroxine (SYNTHROID/LEVOTHROID) 75 MCG tablet  lisinopril (ZESTRIL) 40 MG tablet  oxybutynin ER (DITROPAN-XL) 10 MG 24 hr tablet  pantoprazole (PROTONIX) 40 MG EC tablet  polyethylene glycol (MIRALAX) 17 g packet  potassium chloride ER (K-TAB/KLOR-CON) 10 MEQ CR tablet  pravastatin (PRAVACHOL) 40 MG tablet  blood glucose (ACCU-CHEK COMPACT PLUS) test strip  blood glucose (NO BRAND SPECIFIED) test strip  blood glucose monitoring (NO BRAND SPECIFIED) meter device kit  blood glucose monitoring (SOFTCLIX) lancets  furosemide (LASIX) 20 MG tablet  Incontinence Supply Disposable (PROTECTIVE UNDERWEAR SUPER LG) MISC  meclizine (ANTIVERT) 25 MG tablet  Multiple Vitamins-Minerals (MULTIVITAL PO)  order for DME          Review of Systems   Constitutional: Negative for chills and fever.   HENT: Negative for congestion.    Respiratory: Negative for cough and shortness of breath.    Cardiovascular: Negative for chest pain.   Gastrointestinal: Positive for nausea. Negative for abdominal pain, blood in stool, constipation, diarrhea and vomiting.   Genitourinary: Positive for flank pain. Negative for difficulty urinating, dysuria, frequency, hematuria, pelvic pain and urgency.   Skin: Negative.    Neurological: Positive for light-headedness (with the pain). Negative for dizziness.   All other systems reviewed and are negative.      Physical Exam   BP: (!) 162/98  Pulse:  82  Temp: 98.6  F (37  C)  Resp: 18  Weight: 95.3 kg (210 lb)  SpO2: 98 %      Physical Exam  Constitutional:       General: She is not in acute distress.     Appearance: Normal appearance. She is well-developed. She is not ill-appearing.   HENT:      Head: Normocephalic and atraumatic.      Right Ear: External ear normal.      Left Ear: External ear normal.      Nose: Nose normal.      Mouth/Throat:      Mouth: Mucous membranes are moist.   Eyes:      Conjunctiva/sclera: Conjunctivae normal.   Cardiovascular:      Rate and Rhythm: Normal rate and regular rhythm.      Heart sounds: Normal heart sounds. No murmur heard.      Pulmonary:      Effort: Pulmonary effort is normal. No respiratory distress.      Breath sounds: Normal breath sounds.   Abdominal:      General: Bowel sounds are normal. There is no distension.      Palpations: Abdomen is soft.      Tenderness: There is abdominal tenderness. There is left CVA tenderness. There is no guarding.   Musculoskeletal:         General: Normal range of motion.   Skin:     General: Skin is warm and dry.      Findings: No rash.   Neurological:      General: No focal deficit present.      Mental Status: She is alert and oriented to person, place, and time.         ED Course             Procedures         Results for orders placed or performed during the hospital encounter of 01/12/22 (from the past 24 hour(s))   CBC with platelets differential    Narrative    The following orders were created for panel order CBC with platelets differential.  Procedure                               Abnormality         Status                     ---------                               -----------         ------                     CBC with platelets and d...[[id number]]                      Final result                 Please view results for these tests on the individual orders.   Comprehensive metabolic panel   Result Value Ref Range    Sodium 140 133 - 144 mmol/L    Potassium 4.3 3.4 - 5.3  mmol/L    Chloride 113 (H) 94 - 109 mmol/L    Carbon Dioxide (CO2) 23 20 - 32 mmol/L    Anion Gap 4 3 - 14 mmol/L    Urea Nitrogen 23 7 - 30 mg/dL    Creatinine 1.35 (H) 0.52 - 1.04 mg/dL    Calcium 9.2 8.5 - 10.1 mg/dL    Glucose 115 (H) 70 - 99 mg/dL    Alkaline Phosphatase 71 40 - 150 U/L    AST 27 0 - 45 U/L    ALT 35 0 - 50 U/L    Protein Total 7.4 6.8 - 8.8 g/dL    Albumin 3.7 3.4 - 5.0 g/dL    Bilirubin Total 0.4 0.2 - 1.3 mg/dL    GFR Estimate 40 (L) >60 mL/min/1.73m2   CBC with platelets and differential   Result Value Ref Range    WBC Count 7.5 4.0 - 11.0 10e3/uL    RBC Count 4.43 3.80 - 5.20 10e6/uL    Hemoglobin 13.7 11.7 - 15.7 g/dL    Hematocrit 41.3 35.0 - 47.0 %    MCV 93 78 - 100 fL    MCH 30.9 26.5 - 33.0 pg    MCHC 33.2 31.5 - 36.5 g/dL    RDW 12.2 10.0 - 15.0 %    Platelet Count 184 150 - 450 10e3/uL    % Neutrophils 69 %    % Lymphocytes 20 %    % Monocytes 8 %    % Eosinophils 2 %    % Basophils 1 %    % Immature Granulocytes 0 %    NRBCs per 100 WBC 0 <1 /100    Absolute Neutrophils 5.3 1.6 - 8.3 10e3/uL    Absolute Lymphocytes 1.5 0.8 - 5.3 10e3/uL    Absolute Monocytes 0.6 0.0 - 1.3 10e3/uL    Absolute Eosinophils 0.1 0.0 - 0.7 10e3/uL    Absolute Basophils 0.0 0.0 - 0.2 10e3/uL    Absolute Immature Granulocytes 0.0 <=0.4 10e3/uL    Absolute NRBCs 0.0 10e3/uL   UA with Microscopic reflex to Culture    Specimen: Urine, Midstream   Result Value Ref Range    Color Urine Yellow Colorless, Straw, Light Yellow, Yellow    Appearance Urine Clear Clear    Glucose Urine Negative Negative mg/dL    Bilirubin Urine Negative Negative    Ketones Urine Negative Negative mg/dL    Specific Gravity Urine 1.010 1.003 - 1.035    Blood Urine Large (A) Negative    pH Urine 5.0 5.0 - 7.0    Protein Albumin Urine Negative Negative mg/dL    Urobilinogen Urine Normal Normal, 2.0 mg/dL    Nitrite Urine Negative Negative    Leukocyte Esterase Urine Negative Negative    Bacteria Urine Few (A) None Seen /HPF    Mucus  Urine Present (A) None Seen /LPF    RBC Urine 21 (H) <=2 /HPF    WBC Urine 1 <=5 /HPF    Squamous Epithelials Urine 3 (H) <=1 /HPF    Narrative    Urine Culture not indicated   CT Abdomen Pelvis w/o Contrast    Narrative    CT ABDOMEN AND PELVIS WITHOUT CONTRAST 1/12/2022 1:34 PM    CLINICAL HISTORY: Flank pain, stone disease suspected - left.    TECHNIQUE: CT scan of the abdomen and pelvis was performed without IV  contrast. Multiplanar reformats were obtained. Dose reduction  techniques were used.    CONTRAST: None.    COMPARISON: CT abdomen and pelvis dated 9/3/2017.    FINDINGS:   LOWER CHEST: There is a large hiatal hernia containing approximately  50% of the stomach. There are thoracic aortic calcifications. There  are moderate to severe coronary artery calcifications. Heart is  grossly normal in size. Visualized portions of the lung bases are  grossly clear.    HEPATOBILIARY: Patient is status post cholecystectomy. Liver is normal  in appearance for a noncontrast CT. No biliary ductal dilatation,  focal intrahepatic lesion, or choledocholithiasis.    PANCREAS: Normal.    SPLEEN: Normal.    ADRENAL GLANDS: Normal.    KIDNEYS/BLADDER: There is mild left hydronephrosis and hydroureter.  There is a distal left ureteral calculus (image 80 series 2, image 67  series 4 and image 90 series 5) measuring 0.4 x 0.5 x 0.6 cm. This is  located approximately 3 cm proximal to the left ureterovesicular  junction. No other hydronephrosis, nephrolithiasis, hydroureter or  ureteral calculus is identified. Urinary bladder is otherwise grossly  unremarkable.    BOWEL: Colon is grossly of normal caliber without pericolonic  inflammatory change to suggest acute diverticulitis. Cecum is flipped  anteriorly and slightly superiorly consistent with a hypermobile  cecum. The appendix is not definitely seen. No pericecal inflammatory  change to suggest acute appendicitis. Small bowel is of normal  caliber. There is mild thickening of the  wall of the stomach which is  likely due to incomplete distention. Approximately 50% of the stomach  is in the posterior mediastinum in a large hiatal hernia.    LYMPH NODES: Normal.    VASCULATURE: Extensive nonaneurysmal atherosclerosis is noted.    PELVIC ORGANS: The uterus contains multiple densely calcified  structures likely representing calcified fibroids. Uterus is otherwise  grossly of normal size. Ovaries are atrophic but otherwise  unremarkable. No adnexal masses are identified.    OTHER: No free fluid or free air collections are seen in the  peritoneal cavity. Evaluation of the solid organs of the abdomen and  pelvis is limited due to lack of IV contrast. Small fat-containing  left inguinal hernia is slightly decreased in prominence since the  prior study.    MUSCULOSKELETAL: There is S-shaped lateral curvature of the  thoracolumbar spine with the curvature mostly seen in the lumbar  spine. Multilevel bridging spondylotic spurring is seen in the  thoracic spine with maintenance of disc levels concerning for diffuse  idiopathic skeletal hyperostosis. Disc height loss is seen at multiple  levels lumbar spine. Right lateral listhesis of L3 with respect to L4  is likely degenerative in etiology. No acute fracture or aggressive  osseous lesion is seen.      Impression    IMPRESSION:   1.  Distal left ureteral calculus (approximately 3.0 cm proximal to  the ureterovesicular junction) cause at least partial left ureteral  obstruction. This is likely the cause of the patient's symptoms.  2.  Extensive atherosclerosis.  3.  Large hiatal hernia contains approximately 50% of the stomach.  4.  Multiple calcified uterine fibroids.    I discussed the distal left ureteral calculus with Chanel Longoria on  1/12/2022 at approximately 2:30 PM.       Medications - No data to display  1:49 PM updated patient regarding lab and urine results. Awaiting CT findings.    Assessments & Plan (with Medical Decision Making)   81 year  old female with intermittent left flank pain for the last 4 days that became more constant and increased severity at 3am today. Nausea, but no vomiting. No other GI symptoms. No fevers.    On exam she is alert and oriented. She does not appear ill or in acute distress. Afebrile. BP mildly elevated. No tachycardia. Left CVA tenderness. No abdominal tenderness. Pt concerned for kidney stone, prior history several years ago requiring lithotripsy and stent.    UA with large blood, few bacteria, 21 RBCs, 1 WBC and 3 squam epith cells. No evidence of infection.  No leukocytosis. Normal Hgb.  Creat 1.35 (up from 1.0 on 9/15/2021), GFR 40.  Normal LFTs.    Abdominal/Pelvis CT (stone technique): left distal ureteral stone (measures 0.4 x 0.5 x 0.6 cm) and is located about 3 cm proximal to the left UVJ.  Partial obstruction with hydronephrosis and hydroureter. This may possibly pass on it's own. However, I have placed a urology referral for patient to make appointment if not improving in the next few days. Unfortunately, there are no available urology appointments here until March. I placed referral for [facility]    I reviewed results with patient. Patient would prefer not to go to [place]. She asked about [place] and I informed patient that it is outside of [place] but if she would prefer to go there that is fine and she can call to make appointment.    Plan:  Drink plenty of fluids.  Tylenol 650 mg every 4-6 hours as needed for pain.  Ibuprofen 400-600 mg every 6-8 hours as needed for pain  (take with food, stop if causing stomach pains.)  You should have recheck with Urology- Referral sent ([facility])   Call--[phone]  Return for fevers, vomiting, not tolerating the pain, or worse in any way.      New Prescriptions    No medications on file       Final diagnoses:   Left ureteral stone       1/12/2022   [facility] EMERGENCY DEPT     Von, Chanel Stevens, MAYI CNP  01/12/22  [id number]

## 2022-01-13 ENCOUNTER — TELEPHONE (OUTPATIENT)
Dept: FAMILY MEDICINE | Facility: OTHER | Age: 82
End: 2022-01-13
Payer: COMMERCIAL

## 2022-01-13 NOTE — TELEPHONE ENCOUNTER
Was seen in ED at [facility] and dx with a kidney stone.     States it was small so she would need to pass it and follow-up with Urology.     [place] and [place] are both scheduled out into March. Was told to contact her PCP in regards to what she should do.     Patient states that ED told her that she needs to follow-up with Urology in 1.5 weeks and she does not feel like she should wait this long to be seen.    Please review ED notes from 1/12    Routing to PCP to advise on next steps.    BALAJI Wilson/[facility]  January 13, 2022

## 2022-01-13 NOTE — TELEPHONE ENCOUNTER
Can schedule with me for ED follow-up instead. We just need to make sure the stone passes (will likely need an ultrasound). Hopefully she will be feeling better in the next few days.     Carlos Stinson PA-C  [facility]

## 2022-01-17 ENCOUNTER — OFFICE VISIT (OUTPATIENT)
Dept: FAMILY MEDICINE | Facility: OTHER | Age: 82
End: 2022-01-17
Payer: COMMERCIAL

## 2022-01-17 VITALS
OXYGEN SATURATION: 97 % | RESPIRATION RATE: 16 BRPM | DIASTOLIC BLOOD PRESSURE: 78 MMHG | BODY MASS INDEX: 34.79 KG/M2 | WEIGHT: 208.8 LBS | HEART RATE: 68 BPM | TEMPERATURE: 97.6 F | SYSTOLIC BLOOD PRESSURE: 148 MMHG

## 2022-01-17 DIAGNOSIS — N20.0 NEPHROLITHIASIS: Primary | ICD-10-CM

## 2022-01-17 DIAGNOSIS — N23 RENAL COLIC: ICD-10-CM

## 2022-01-17 DIAGNOSIS — E11.51 DIABETES MELLITUS WITH PERIPHERAL VASCULAR DISEASE (H): ICD-10-CM

## 2022-01-17 DIAGNOSIS — N18.31 STAGE 3A CHRONIC KIDNEY DISEASE (H): ICD-10-CM

## 2022-01-17 PROBLEM — M46.1 SACROILIITIS, NOT ELSEWHERE CLASSIFIED (H): Status: RESOLVED | Noted: 2021-01-06 | Resolved: 2022-01-17

## 2022-01-17 LAB
ALBUMIN UR-MCNC: NEGATIVE MG/DL
ANION GAP SERPL CALCULATED.3IONS-SCNC: 2 MMOL/L (ref 3–14)
APPEARANCE UR: CLEAR
BACTERIA #/AREA URNS HPF: ABNORMAL /HPF
BILIRUB UR QL STRIP: NEGATIVE
BUN SERPL-MCNC: 20 MG/DL (ref 7–30)
CALCIUM SERPL-MCNC: 9.4 MG/DL (ref 8.5–10.1)
CHLORIDE BLD-SCNC: 113 MMOL/L (ref 94–109)
CO2 SERPL-SCNC: 27 MMOL/L (ref 20–32)
COLOR UR AUTO: YELLOW
CREAT SERPL-MCNC: 1.23 MG/DL (ref 0.52–1.04)
GFR SERPL CREATININE-BSD FRML MDRD: 44 ML/MIN/1.73M2
GLUCOSE BLD-MCNC: 107 MG/DL (ref 70–99)
GLUCOSE UR STRIP-MCNC: NEGATIVE MG/DL
HGB UR QL STRIP: ABNORMAL
KETONES UR STRIP-MCNC: NEGATIVE MG/DL
LEUKOCYTE ESTERASE UR QL STRIP: NEGATIVE
NITRATE UR QL: NEGATIVE
PH UR STRIP: 5 [PH] (ref 5–7)
POTASSIUM BLD-SCNC: 4.4 MMOL/L (ref 3.4–5.3)
RBC #/AREA URNS AUTO: ABNORMAL /HPF
SODIUM SERPL-SCNC: 142 MMOL/L (ref 133–144)
SP GR UR STRIP: 1.02 (ref 1–1.03)
SQUAMOUS #/AREA URNS AUTO: ABNORMAL /LPF
UROBILINOGEN UR STRIP-ACNC: 0.2 E.U./DL
WBC #/AREA URNS AUTO: ABNORMAL /HPF

## 2022-01-17 PROCEDURE — 99214 OFFICE O/P EST MOD 30 MIN: CPT | Performed by: PHYSICIAN ASSISTANT

## 2022-01-17 PROCEDURE — 36415 COLL VENOUS BLD VENIPUNCTURE: CPT | Performed by: PHYSICIAN ASSISTANT

## 2022-01-17 PROCEDURE — 80048 BASIC METABOLIC PNL TOTAL CA: CPT | Performed by: PHYSICIAN ASSISTANT

## 2022-01-17 PROCEDURE — 81001 URINALYSIS AUTO W/SCOPE: CPT | Performed by: PHYSICIAN ASSISTANT

## 2022-01-17 RX ORDER — TAMSULOSIN HYDROCHLORIDE 0.4 MG/1
0.8 CAPSULE ORAL DAILY
Qty: 20 CAPSULE | Refills: 0 | Status: SHIPPED | OUTPATIENT
Start: 2022-01-17 | End: 2022-06-28

## 2022-01-17 ASSESSMENT — PAIN SCALES - GENERAL: PAINLEVEL: MODERATE PAIN (4)

## 2022-01-17 NOTE — PROGRESS NOTES
Assessment & Plan     ICD-10-CM    1. Nephrolithiasis  N20.0 UA Macro with Reflex to Micro and Culture - lab collect     Basic metabolic panel  (Ca, Cl, CO2, Creat, Gluc, K, Na, BUN)     US Renal Complete     UA Macro with Reflex to Micro and Culture - lab collect     Basic metabolic panel  (Ca, Cl, CO2, Creat, Gluc, K, Na, BUN)     Adult Urology Referral     tamsulosin (FLOMAX) 0.4 MG capsule     Urine Microscopic   2. Stage 3a chronic kidney disease (H)  N18.31    3. Diabetes mellitus with peripheral vascular disease (H)  E11.51    4. Renal colic  N23 Adult Urology Referral     - Patient with confirmed  left distal ureteral stone (measures 0.4 x 0.5 x 0.6 cm) and is located about 3 cm proximal to the left UVJ.  Partial obstruction with hydronephrosis and hydroureter   - Patient states pain is unchanged, urination unchanged, both since was seen in ED 5 days ago   - Has been taking Tylenol TID for her arthritis, only took Ibuprofen 400 mg today because pain was so bad   - Recommend recheck of urine to assure not getting infection behind stone as well as recheck of BMP as slight elevation in Cr level when in ED (likely secondary to dehydration and stone)   - Urine today shows improvement in that there is less blood   - Will await BMP results   - Advised due to CKD, should use NSAIDs very sparingly      Discussed the risks of this vs the risks of opioids for her pain      Patient declines pain medication, just will use 400 mg Ibuprofen when really bad   - Discussed needing to wait until passes      Recommend Flomax to assist in this, reviewed use and side effects      Reviewed as above, was close to end of ureter, so getting close to being out   - PLAN      Flomax and monitoring      If doesn't improve in the next few days, will then get renal ultrasound to assess if getting closer to being out       If still there and doesn't improve, will need to urgently see urology, referral placed   - Discussed warning signs that  would warrant return to clinic/ED      Including s/s of infection or worsening/uncontrolled pain      May also call me if wants a few pain pills for at night, would send Norco, discussed use and side effects of this including sedation and falls in the elderly   - Continue close monitoring of blood sugars while going through this, no concerns today         Review of the result(s) of each unique test - See list       1/12/21 - CT abd/pelvis, UA, CMP, CBC   Diagnosis or treatment significantly limited by social determinants of health - None     36 minutes spent on the date of the encounter doing chart review, history and exam, documentation and further activities as noted above    The patient indicates understanding of these issues and agrees with the plan.    Return in about 3 days (around 1/20/2022) for if not improving.    I, Carlos Stinson PA-C,  was present with the PA student who participated in the service and in the documentation of the note.  I have verified the history and personally performed the physical exam and medical decision making.  I agree with the assessment and plan of care as documented in the note.     HORACIO Cherry-S2  [employer] Cassondra BRANDEN RajanC  [facility] LALITA Roque is a 81 year old who presents for the following health issues  accompanied by her daughter.    [facility]     ED/UC Followup:    Facility:  [facility]   Date of visit: 1/12/22  Reason for visit: Kidney stone   Current Status: Still having left sided abdominal pain 4/10 currently.    Interval History:  No pain with urination. Still having left sided flank pain radiating into her abdomen and back, has been intermittent. Last episode was at 1pm and quite severe. Pain has improved since then. No hematuria that she has seen. Urinating normally since yesterday, but did have an episode where she felt the urge to urinate but could not. No  fever. Intermittent nausea, no vomiting. No diarrhea or constipation. No dizziness or lightheadedness. Using Ibuprofen for the pain.     - [facility] was march, [facility] was March   - Takes Tylenol three times a day   - Ibuprofen - didn't need any yesterday, just today      Just took 2 of them today at 1 pm, made pain much better            ED NOTE   81 year old female with intermittent left flank pain for the last 4 days that became more constant and increased severity at 3am today. Nausea, but no vomiting. No other GI symptoms. No fevers.     On exam she is alert and oriented. She does not appear ill or in acute distress. Afebrile. BP mildly elevated. No tachycardia. Left CVA tenderness. No abdominal tenderness. Pt concerned for kidney stone, prior history several years ago requiring lithotripsy and stent.     UA with large blood, few bacteria, 21 RBCs, 1 WBC and 3 squam epith cells. No evidence of infection.  No leukocytosis. Normal Hgb.  Creat 1.35 (up from 1.0 on 9/15/2021), GFR 40.  Normal LFTs.     Abdominal/Pelvis CT (stone technique): left distal ureteral stone (measures 0.4 x 0.5 x 0.6 cm) and is located about 3 cm proximal to the left UVJ.  Partial obstruction with hydronephrosis and hydroureter. This may possibly pass on it's own. However, I have placed a urology referral for patient to make appointment if not improving in the next few days. Unfortunately, there are no available urology appointments here until March. I placed referral for [facility]     I reviewed results with patient. Patient would prefer not to go to [facility]. She asked about [facility] and I informed patient that it is outside of [place] but if she would prefer to go there that is fine and she can call to make appointment.     Plan:  Drink plenty of fluids.  Tylenol 650 mg every 4-6 hours as needed for pain.  Ibuprofen 400-600 mg every 6-8 hours as needed for pain  (take with food, stop if causing stomach pains.)  You  should have recheck with Urology- Referral sent ([facility])   Call--[phone]  Return for fevers, vomiting, not tolerating the pain, or worse in any way.       Review of Systems   Constitutional, HEENT, cardiovascular, pulmonary, gi and gu systems are negative, except as otherwise noted.      Objective    BP (!) 148/78 (BP Location: Right arm, Patient Position: Chair, Cuff Size: Adult Regular)   Pulse 68   Temp 97.6  F (36.4  C) (Temporal)   Resp 16   Wt 94.7 kg (208 lb 12.8 oz)   LMP  (LMP Unknown)   SpO2 97%   BMI 34.79 kg/m    Body mass index is 34.79 kg/m .  Physical Exam   GENERAL APPEARANCE: healthy, alert and no distress  EYES: Eyes grossly normal to inspection, PERRLA, conjunctivae and sclerae without injection or discharge, EOM intact   RESP: Lungs clear to auscultation - no rales, rhonchi or wheezes    CV: Regular rates and rhythm, normal S1 S2, no S3 or S4, no murmur, click or rub, no peripheral edema and peripheral pulses strong and symmetric bilaterally   Abdominal: soft. No tenderness to palpation. No rebound or guarding. No CVA tenderness  MS: No musculoskeletal defects are noted and gait is age appropriate without ataxia   SKIN: No suspicious lesions or rashes, hydration status appears adeuqate with normal skin turgor   PSYCH: Alert and oriented x3; speech- coherent , normal rate and volume; able to articulate logical thoughts, able to abstract reason, no tangential thoughts, no hallucinations or delusions, mentation appears normal, Mood is euthymic. Affect is appropriate for this mood state and bright. Thought content is free of suicidal ideation, hallucinations, and delusions. Dress is adequate and upkept. Eye contact is good during conversation.       Diagnostics: reviewed in Epic, see orders pending in Epic

## 2022-01-17 NOTE — PATIENT INSTRUCTIONS
- Start Flomax - 2 capsules in the AM     - Continue Ibuprofen if needed     - Await lab results     - When they call you to schedule ultrasound, shoot for Friday/Monday, can cancel if passes

## 2022-01-18 NOTE — RESULT ENCOUNTER NOTE
Please call patient with the following message    Kidney function improving. This is good news. We will need to recheck this once her stone has passed. Recommend scheduling lab only recheck in 1 week.     Carlos Stinson PA-C

## 2022-01-24 ENCOUNTER — TELEPHONE (OUTPATIENT)
Dept: FAMILY MEDICINE | Facility: OTHER | Age: 82
End: 2022-01-24

## 2022-01-24 ENCOUNTER — ANCILLARY PROCEDURE (OUTPATIENT)
Dept: ULTRASOUND IMAGING | Facility: OTHER | Age: 82
End: 2022-01-24
Attending: PHYSICIAN ASSISTANT
Payer: COMMERCIAL

## 2022-01-24 DIAGNOSIS — N20.0 NEPHROLITHIASIS: ICD-10-CM

## 2022-01-24 PROCEDURE — 76770 US EXAM ABDO BACK WALL COMP: CPT | Performed by: RADIOLOGY

## 2022-01-24 NOTE — TELEPHONE ENCOUNTER
Please call patient and get update on how she is feeling     Her ultrasound looked good, the kidney was no longer dilated and there was no stone visualized.     Route back to provider    Carlos Stinson PA-C  [facility]

## 2022-01-24 NOTE — TELEPHONE ENCOUNTER
Patient states no pain, she feels fine. She was surprised to hear the ultrasound didn't show a stone. She said she never felt a stone pass.     She states the lady who did her ultrasound mentioned that her grandma had acupuncture at [facility] for bladder leakage, she asked if there is anyone in the [employer] system who does this. If not can she be referred to [facility] for it.

## 2022-01-25 NOTE — TELEPHONE ENCOUNTER
Called and spoke to patient. Relayed providers message, patient states she does not have a Urologist, I mentioned that a urologist clinic was trying to reach her, she says oh ya,I do not travel to [place]. She is wanting something closer.   I did a little research, gave her the number to [facility] as they have many locations. I mentioned that she should call them first to see if she needs a referral, if they take her insurance.     Patient voiced understanding. She will call back if she has any other questions.    María Elena Nevarez MA on 1/25/2022 at 10:06 AM

## 2022-01-25 NOTE — TELEPHONE ENCOUNTER
I don't know anything about that kind of therapy. I would recommend she discuss with urology as they would be the one to order it.     Carlos Torres-SAILAJA Orr  [facility]

## 2022-01-31 DIAGNOSIS — E78.5 HYPERLIPIDEMIA WITH TARGET LDL LESS THAN 100: ICD-10-CM

## 2022-02-01 RX ORDER — PRAVASTATIN SODIUM 40 MG
TABLET ORAL
Qty: 90 TABLET | Refills: 3 | Status: SHIPPED | OUTPATIENT
Start: 2022-02-01 | End: 2023-02-22

## 2022-02-01 NOTE — TELEPHONE ENCOUNTER
Pending Prescriptions:                       Disp   Refills    pravastatin (PRAVACHOL) 40 MG tablet [Phar*90 tab*2        Sig: TAKE ONE TABLET BY MOUTH ONCE DAILY    Routing refill request to provider for review/approval because:  Labs not current:    Recent Labs   Lab Test 01/06/21  1143 11/20/19  1108 08/16/16  1443 05/22/15  1142 05/16/14  0907   CHOL 162 157   < > 148 164   HDL 41* 63   < > 47* 41*   LDL 71 71   < > 68 99   TRIG 248* 116   < > 165* 120   CHOLHDLRATIO  --   --   --  3.1 4.0    < > = values in this interval not displayed.     pravastatin (PRAVACHOL) 40 MG tablet 90 tablet 3 1/29/2021  No   Sig - Route: Take 1 tablet (40 mg) by mouth daily - Oral   Patient taking differently: Take 40 mg by mouth At Bedtime         Sent to pharmacy as: Pravastatin Sodium 40 MG Oral Tablet (PRAVACHOL)   Class: E-Prescribe   Order: [id number]   E-Prescribing Status: Receipt confirmed by pharmacy (1/29/2021 11:28 AM CST)     Sendy Choi RN on 2/1/2022 at 1:51 PM

## 2022-02-03 DIAGNOSIS — E78.5 HYPERLIPIDEMIA WITH TARGET LDL LESS THAN 100: ICD-10-CM

## 2022-02-03 RX ORDER — CHOLESTYRAMINE 4 G/9G
POWDER, FOR SUSPENSION ORAL
Qty: 60 PACKET | Refills: 11 | Status: SHIPPED | OUTPATIENT
Start: 2022-02-03 | End: 2023-02-02

## 2022-02-03 NOTE — TELEPHONE ENCOUNTER
Pending Prescriptions:                       Disp   Refills    cholestyramine (QUESTRAN) 4 g packet [Phar*60 pac*11       Sig: DISSOLVE AND TAKE ONE PACKET BY MOUTH TWICE A DAY           WITH MEALS    Routing refill request to provider for review/approval because:  Labs not current:   Recent Labs   Lab Test 01/06/21  1143 11/20/19  1108 08/16/16  1443 05/22/15  1142 05/16/14  0907   CHOL 162 157   < > 148 164   HDL 41* 63   < > 47* 41*   LDL 71 71   < > 68 99   TRIG 248* 116   < > 165* 120   CHOLHDLRATIO  --   --   --  3.1 4.0    < > = values in this interval not displayed.

## 2022-02-14 DIAGNOSIS — E87.6 HYPOKALEMIA: ICD-10-CM

## 2022-02-15 RX ORDER — POTASSIUM CHLORIDE 750 MG/1
TABLET, EXTENDED RELEASE ORAL
Qty: 180 TABLET | Refills: 0 | Status: SHIPPED | OUTPATIENT
Start: 2022-02-15 | End: 2022-04-28

## 2022-02-17 PROBLEM — K21.9 GASTROESOPHAGEAL REFLUX DISEASE: Status: ACTIVE | Noted: 2017-09-14

## 2022-03-10 ENCOUNTER — TRANSFERRED RECORDS (OUTPATIENT)
Dept: HEALTH INFORMATION MANAGEMENT | Facility: CLINIC | Age: 82
End: 2022-03-10
Payer: COMMERCIAL

## 2022-03-30 DIAGNOSIS — I10 ESSENTIAL HYPERTENSION WITH GOAL BLOOD PRESSURE LESS THAN 140/90: ICD-10-CM

## 2022-03-30 NOTE — TELEPHONE ENCOUNTER
"Pending Prescriptions:                       Disp   Refills    diltiazem ER COATED BEADS (CARDIZEM CD/CAR*180 ca*1        Sig: TAKE ONE CAPSULE BY MOUTH TWICE A DAY    Routing refill request to provider for review/approval because:  Labs out of range:      Requested Prescriptions   Pending Prescriptions Disp Refills    diltiazem ER COATED BEADS (CARDIZEM CD/CARTIA XT) 120 MG 24 hr capsule [Pharmacy Med Name: DILTIAZEM HCL ER COATED  CP24] 180 capsule 1     Sig: TAKE ONE CAPSULE BY MOUTH TWICE A DAY        Calcium Channel Blockers Protocol  Failed - 3/30/2022  4:47 PM        Failed - Blood pressure under 140/90 in past 12 months       BP Readings from Last 3 Encounters:   01/17/22 (!) 148/78   01/12/22 (!) 168/110   11/08/21 124/70                 Failed - Normal serum creatinine on file in past 12 months     Recent Labs   Lab Test 01/17/22  1454   CR 1.23*       Ok to refill medication if creatinine is low          Passed - Normal ALT in past 12 months     Recent Labs   Lab Test 01/12/22  1306   ALT 35               Passed - Recent (12 mo) or future (30 days) visit within the authorizing provider's specialty     Patient has had an office visit with the authorizing provider or a provider within the authorizing providers department within the previous 12 mos or has a future within next 30 days. See \"Patient Info\" tab in inbasket, or \"Choose Columns\" in Meds & Orders section of the refill encounter.              Passed - Medication is active on med list        Passed - Patient is age 18 or older        Passed - No active pregnancy on record        Passed - No positive pregnancy test in past 12 months                    "

## 2022-03-31 RX ORDER — DILTIAZEM HYDROCHLORIDE 120 MG/1
CAPSULE, COATED, EXTENDED RELEASE ORAL
Qty: 180 CAPSULE | Refills: 3 | Status: SHIPPED | OUTPATIENT
Start: 2022-03-31 | End: 2023-01-03

## 2022-04-08 NOTE — TELEPHONE ENCOUNTER
Patient is alert and oriented times three with no signs or symptoms of distress.  Lap sites are intact and no nausea or vomiting She was not sure where she would be going yesterday.  I asked her to call and let us know where she schedule an appointment.  Please make the appropriate referral for her to [facility] dermatology.  This is for a facial skin lesion.

## 2022-04-20 DIAGNOSIS — E03.9 HYPOTHYROIDISM, UNSPECIFIED TYPE: ICD-10-CM

## 2022-04-21 RX ORDER — LEVOTHYROXINE SODIUM 75 UG/1
TABLET ORAL
Qty: 90 TABLET | Refills: 3 | Status: SHIPPED | OUTPATIENT
Start: 2022-04-21 | End: 2023-04-19

## 2022-04-21 NOTE — TELEPHONE ENCOUNTER
Pending Prescriptions:                       Disp   Refills    levothyroxine (SYNTHROID/LEVOTHROID) 75 MC*90 tab*0        Sig: TAKE ONE TABLET BY MOUTH ONCE DAILY    Routing refill request to provider for review/approval because:  Labs not current:  tsh  TSH   Date Value Ref Range Status   11/10/2020 3.46 0.40 - 4.00 mU/L Final     Sunita Tellez RN

## 2022-04-27 DIAGNOSIS — E87.6 HYPOKALEMIA: ICD-10-CM

## 2022-04-28 RX ORDER — POTASSIUM CHLORIDE 750 MG/1
TABLET, EXTENDED RELEASE ORAL
Qty: 180 TABLET | Refills: 0 | Status: SHIPPED | OUTPATIENT
Start: 2022-04-28 | End: 2022-06-27

## 2022-05-13 ENCOUNTER — OFFICE VISIT (OUTPATIENT)
Dept: FAMILY MEDICINE | Facility: OTHER | Age: 82
End: 2022-05-13
Payer: COMMERCIAL

## 2022-05-13 VITALS
OXYGEN SATURATION: 95 % | WEIGHT: 208 LBS | TEMPERATURE: 98.1 F | SYSTOLIC BLOOD PRESSURE: 121 MMHG | BODY MASS INDEX: 34.66 KG/M2 | HEART RATE: 61 BPM | DIASTOLIC BLOOD PRESSURE: 74 MMHG | HEIGHT: 65 IN

## 2022-05-13 DIAGNOSIS — N18.31 STAGE 3A CHRONIC KIDNEY DISEASE (H): ICD-10-CM

## 2022-05-13 DIAGNOSIS — M54.16 LUMBAR RADICULOPATHY: ICD-10-CM

## 2022-05-13 DIAGNOSIS — I10 ESSENTIAL HYPERTENSION WITH GOAL BLOOD PRESSURE LESS THAN 140/90: Primary | ICD-10-CM

## 2022-05-13 DIAGNOSIS — E11.22 DIABETES MELLITUS WITH STAGE 3 CHRONIC KIDNEY DISEASE (H): ICD-10-CM

## 2022-05-13 DIAGNOSIS — N18.30 DIABETES MELLITUS WITH STAGE 3 CHRONIC KIDNEY DISEASE (H): ICD-10-CM

## 2022-05-13 DIAGNOSIS — E03.9 HYPOTHYROIDISM, UNSPECIFIED TYPE: ICD-10-CM

## 2022-05-13 DIAGNOSIS — E78.5 HYPERLIPIDEMIA WITH TARGET LDL LESS THAN 100: ICD-10-CM

## 2022-05-13 LAB
ALBUMIN SERPL-MCNC: 4 G/DL (ref 3.4–5)
ALP SERPL-CCNC: 68 U/L (ref 40–150)
ALT SERPL W P-5'-P-CCNC: 41 U/L (ref 0–50)
ANION GAP SERPL CALCULATED.3IONS-SCNC: 5 MMOL/L (ref 3–14)
AST SERPL W P-5'-P-CCNC: 27 U/L (ref 0–45)
BILIRUB SERPL-MCNC: 0.4 MG/DL (ref 0.2–1.3)
BUN SERPL-MCNC: 17 MG/DL (ref 7–30)
CALCIUM SERPL-MCNC: 9 MG/DL (ref 8.5–10.1)
CHLORIDE BLD-SCNC: 112 MMOL/L (ref 94–109)
CHOLEST SERPL-MCNC: 157 MG/DL
CO2 SERPL-SCNC: 26 MMOL/L (ref 20–32)
CREAT SERPL-MCNC: 0.98 MG/DL (ref 0.52–1.04)
CREAT UR-MCNC: 323 MG/DL
FASTING STATUS PATIENT QL REPORTED: YES
GFR SERPL CREATININE-BSD FRML MDRD: 58 ML/MIN/1.73M2
GLUCOSE BLD-MCNC: 112 MG/DL (ref 70–99)
HBA1C MFR BLD: 6.2 % (ref 0–5.6)
HDLC SERPL-MCNC: 47 MG/DL
LDLC SERPL CALC-MCNC: 71 MG/DL
MICROALBUMIN UR-MCNC: 27 MG/L
MICROALBUMIN/CREAT UR: 8.36 MG/G CR (ref 0–25)
NONHDLC SERPL-MCNC: 110 MG/DL
POTASSIUM BLD-SCNC: 4.5 MMOL/L (ref 3.4–5.3)
PROT SERPL-MCNC: 7.1 G/DL (ref 6.8–8.8)
SODIUM SERPL-SCNC: 143 MMOL/L (ref 133–144)
TRIGL SERPL-MCNC: 196 MG/DL
TSH SERPL DL<=0.005 MIU/L-ACNC: 2.62 MU/L (ref 0.4–4)

## 2022-05-13 PROCEDURE — 84443 ASSAY THYROID STIM HORMONE: CPT | Performed by: PHYSICIAN ASSISTANT

## 2022-05-13 PROCEDURE — 99207 PR FOOT EXAM NO CHARGE: CPT | Performed by: PHYSICIAN ASSISTANT

## 2022-05-13 PROCEDURE — 36415 COLL VENOUS BLD VENIPUNCTURE: CPT | Performed by: PHYSICIAN ASSISTANT

## 2022-05-13 PROCEDURE — 99214 OFFICE O/P EST MOD 30 MIN: CPT | Performed by: PHYSICIAN ASSISTANT

## 2022-05-13 PROCEDURE — 80061 LIPID PANEL: CPT | Performed by: PHYSICIAN ASSISTANT

## 2022-05-13 PROCEDURE — 82306 VITAMIN D 25 HYDROXY: CPT | Performed by: PHYSICIAN ASSISTANT

## 2022-05-13 PROCEDURE — 83036 HEMOGLOBIN GLYCOSYLATED A1C: CPT | Performed by: PHYSICIAN ASSISTANT

## 2022-05-13 PROCEDURE — 80053 COMPREHEN METABOLIC PANEL: CPT | Performed by: PHYSICIAN ASSISTANT

## 2022-05-13 PROCEDURE — 82043 UR ALBUMIN QUANTITATIVE: CPT | Performed by: PHYSICIAN ASSISTANT

## 2022-05-13 ASSESSMENT — PAIN SCALES - GENERAL: PAINLEVEL: MODERATE PAIN (4)

## 2022-05-13 NOTE — PATIENT INSTRUCTIONS
Discussed the ongoing back pain and recommendation for repeat epidural steroid injection  I suspect that the knee pain is referred from the back    2. There are medications that help to reduce pain   Ex: Duloxetine, gabapentin, robaxin (muscle relaxant)    - If you are interested in trying one of these call    3. Discussed concerns about previous skin biopsies/cancer    - If you find a dermatologist within the area and your network   - Call with Clinic name and number     4. You can try CBD for your pain - do your research     5. We will update labs today including sugars     6. Check vitamin

## 2022-05-13 NOTE — PROGRESS NOTES
Assessment & Plan     Essential hypertension with goal blood pressure less than 140/90  BP is 121/74 today. This is well controlled. No changes to medication recommended. Will update labs.   - Comprehensive metabolic panel (BMP + Alb, Alk Phos, ALT, AST, Total. Bili, TP); Future  - Comprehensive metabolic panel (BMP + Alb, Alk Phos, ALT, AST, Total. Bili, TP)    Hyperlipidemia with target LDL less than 100  Patient's lipids are improving with pravastatin. Advised to her continue the healthy lifestyle habits.   - Lipid Profile (Chol, Trig, HDL, LDL calc); Future  - Lipid Profile (Chol, Trig, HDL, LDL calc)    Hypothyroidism, unspecified type  Thyroid had returned within normal range. Current levothyroxine dose is adequately managing her symptoms.   - TSH WITH FREE T4 REFLEX; Future  - Vitamin D Deficiency; Future  - TSH WITH FREE T4 REFLEX  - Vitamin D Deficiency    Diabetes mellitus with stage 3 chronic kidney disease (H)  A1c returned at 6.2%. The patient has been managing sugars with healthy lifestyle. She has been advised to maintain a healthy diet low in salts/sugars/fatty&greasy foods with regular servings of fruits and vegetables and try to get in 30 minutes of aerobic exercise 5 or more days each week as able.   - Albumin Random Urine Quantitative with Creat Ratio; Future  - HEMOGLOBIN A1C; Future  - FOOT EXAM  - Vitamin D Deficiency; Future  - Albumin Random Urine Quantitative with Creat Ratio  - HEMOGLOBIN A1C  - Vitamin D Deficiency    Stage 3a chronic kidney disease (H)  This has remained stable.     Lumbar radiculopathy  Patient has a history of lumbar scoliosis and stenosis. Had seen neurosurgery 1 year ago in May 2021. She underwent BLANCA which she felt was helpful. This wore off over the past year. Over the past month the patient has found that with prolonged sitting her lower back will hurt and pain will radiate to the knee. This improves with walking. Will have the patient return to neurosurgeon and  repeat BLANCA. I discussed options for management of her symptoms including tylenol, ice/heat, stretching. I offered more long term options including duloxetine or similar medications. She declined, prefers to look into CBD products.   - Neurosurgery Referral; Future    Return in about 6 months (around 11/13/2022) for Return for scheduled annual checkup with PCP.    SAILAJA Del Toro [facility] LALITA Roque is a 82 year old who presents for the following health issues     History of Present Illness       Back Pain:  She presents for follow up of back pain. Patient's back pain is a chronic problem.  Location of back pain:  Right lower back and right middle of back  Description of back pain: dull ache and shooting  Back pain spreads: nowhere    Since patient first noticed back pain, pain is: unchanged  Does back pain interfere with her job:  Not applicable      Reason for visit:  Back and knee  Symptom onset:  More than a month  Symptoms include:  Lower back  Symptom intensity:  Severe  Symptom progression:  Staying the same  Had these symptoms before:  Yes  Has tried/received treatment for these symptoms:  Yes  Previous treatment was successful:  Yes  Prior treatment description:  Shots in back  What makes it worse:  Standing  What makes it better:  Use cold pack or heating pad    She eats 0-1 servings of fruits and vegetables daily.She consumes 1 sweetened beverage(s) daily.She exercises with enough effort to increase her heart rate 20 to 29 minutes per day.  She exercises with enough effort to increase her heart rate 5 days per week.   She is taking medications regularly.     Patient is an 82 year old female who presents with concerns about worsening knee pain for the past. She says that her pain has been worse after sitting for prolonged periods of time and improves with walking. She cannot recall any triggering activity or injury (eg fall) that may have caused her pain. She  "does use a walker for support while ambulating. Review of past imaging shows that the patient has a history of moderate degeneration and stenosis throughout her lower back. She had worked with neurosurgery for regular BLANCA to manage this pain, 2018 and 2021. She says that this has been gradually becoming more noticeable over the past year. Does participate in chair exercises offered by the staff in her housing facility.     Review of Systems   Constitutional, HEENT, cardiovascular, pulmonary, gi and gu systems are negative, except as otherwise noted.      Objective    /74 (BP Location: Right arm)   Pulse 61   Temp 98.1  F (36.7  C) (Oral)   Ht 1.644 m (5' 4.72\")   Wt 94.3 kg (208 lb)   LMP  (LMP Unknown)   SpO2 95%   BMI 34.91 kg/m    Body mass index is 34.91 kg/m .  Physical Exam   GENERAL: healthy, alert and no distress  RESP: lungs clear to auscultation - no rales, rhonchi or wheezes  CV: regular rate and rhythm, normal S1 S2, no S3 or S4, no murmur, click or rub, no peripheral edema and peripheral pulses strong  MS: no gross musculoskeletal defects noted, no edema  Normal AROM of right knee  5/5 resisted flexion/extension of the right knee  Negative varus/valgus stress   SKIN:  Callus formation of the pad of the right big toe  Comprehensive back pain exam:  Tenderness of lumbar back muscles and Range of motion not limited by pain            "

## 2022-05-13 NOTE — LETTER
May 16, 2022      Mya Hui  [address]   [facility] [address]        Dear ,    We are writing to inform you of your test results.    The results of your lab work returned with a low vitamin D level at 19, normal is 20 or higher. You can improve this through sun exposure, consumption of fortified foods/beverages or with a vitamin D3 supplement of 800-200ius dialy.     The thyroid levels were normal. The metabolic panel, which evaluates your kidney and liver function, electrolytes and blood sugar, returned grossly normal. Your kidney function returned with consistent finding of chronic kidney disease stage 3. This is unchanged from previous draws. In addition, the cell counts did not show any evidence of anemia, infection or other abnormality. The hemoglobin A1c was 6.2%, this is right within the target range. Great job. Keep up the healthy habits such as balanced diet low in carbs/sugars and regular exercise as able.     The lipids, fats/oils of the body, returned with improvements throughout. This is due to the healthy habits you are practicing as well as your continued use of pravastatin. I do not recommend changing either of these.     Please keep me updated if you knee pain does not improve as expected and we can try one of the medications we discussed at your visit. You can call with your preferred dermatologist. If you have difficulty scheduling with the neurosurgeon please let me know.     Resulted Orders   TSH WITH FREE T4 REFLEX   Result Value Ref Range    TSH 2.62 0.40 - 4.00 mU/L   Albumin Random Urine Quantitative with Creat Ratio   Result Value Ref Range    Creatinine Urine mg/dL 323 mg/dL    Albumin Urine mg/L 27 mg/L    Albumin Urine mg/g Cr 8.36 0.00 - 25.00 mg/g Cr   HEMOGLOBIN A1C   Result Value Ref Range    Hemoglobin A1C 6.2 (H) 0.0 - 5.6 %      Comment:      Normal <5.7%   Prediabetes 5.7-6.4%    Diabetes 6.5% or higher     Note: Adopted from ADA consensus  guidelines.   Lipid Profile (Chol, Trig, HDL, LDL calc)   Result Value Ref Range    Cholesterol 157 <200 mg/dL    Triglycerides 196 (H) <150 mg/dL    Direct Measure HDL 47 (L) >=50 mg/dL    LDL Cholesterol Calculated 71 <=100 mg/dL    Non HDL Cholesterol 110 <130 mg/dL    Patient Fasting > 8hrs? Yes     Narrative    Cholesterol  Desirable:  <200 mg/dL    Triglycerides  Normal:  Less than 150 mg/dL  Borderline High:  150-199 mg/dL  High:  200-499 mg/dL  Very High:  Greater than or equal to 500 mg/dL    Direct Measure HDL  Female:  Greater than or equal to 50 mg/dL   Male:  Greater than or equal to 40 mg/dL    LDL Cholesterol  Desirable:  <100mg/dL  Above Desirable:  100-129 mg/dL   Borderline High:  130-159 mg/dL   High:  160-189 mg/dL   Very High:  >= 190 mg/dL    Non HDL Cholesterol  Desirable:  130 mg/dL  Above Desirable:  130-159 mg/dL  Borderline High:  160-189 mg/dL  High:  190-219 mg/dL  Very High:  Greater than or equal to 220 mg/dL   Comprehensive metabolic panel (BMP + Alb, Alk Phos, ALT, AST, Total. Bili, TP)   Result Value Ref Range    Sodium 143 133 - 144 mmol/L    Potassium 4.5 3.4 - 5.3 mmol/L    Chloride 112 (H) 94 - 109 mmol/L    Carbon Dioxide (CO2) 26 20 - 32 mmol/L    Anion Gap 5 3 - 14 mmol/L    Urea Nitrogen 17 7 - 30 mg/dL    Creatinine 0.98 0.52 - 1.04 mg/dL    Calcium 9.0 8.5 - 10.1 mg/dL    Glucose 112 (H) 70 - 99 mg/dL    Alkaline Phosphatase 68 40 - 150 U/L    AST 27 0 - 45 U/L    ALT 41 0 - 50 U/L    Protein Total 7.1 6.8 - 8.8 g/dL    Albumin 4.0 3.4 - 5.0 g/dL    Bilirubin Total 0.4 0.2 - 1.3 mg/dL    GFR Estimate 58 (L) >60 mL/min/1.73m2      Comment:      Effective December 21, 2021 eGFRcr in adults is calculated using the 2021 CKD-EPI creatinine equation which includes age and gender (Tanisha hunt al., NEJM, DOI: 10.1056/SVEYhv9233923)   Vitamin D Deficiency   Result Value Ref Range    Vitamin D, Total (25-Hydroxy) 19 (L) 20 - 75 ug/L    Narrative    Season, race, dietary intake, and  treatment affect the concentration of 25-hydroxy-Vitamin D. Values may decrease during winter months and increase during summer months. Values 20-29 ug/L may indicate Vitamin D insufficiency and values <20 ug/L may indicate Vitamin D deficiency.    Vitamin D determination is routinely performed by an immunoassay specific for 25 hydroxyvitamin D3.  If an individual is on vitamin D2(ergocalciferol) supplementation, please specify 25 OH vitamin D2 and D3 level determination by LCMSMS test VITD23.         If you have any questions or concerns, please call the clinic at the number listed above.       Sincerely,      Fareed Roldan PA-C

## 2022-05-14 LAB — DEPRECATED CALCIDIOL+CALCIFEROL SERPL-MC: 19 UG/L (ref 20–75)

## 2022-05-15 DIAGNOSIS — N32.81 OAB (OVERACTIVE BLADDER): ICD-10-CM

## 2022-05-17 RX ORDER — OXYBUTYNIN CHLORIDE 10 MG/1
TABLET, EXTENDED RELEASE ORAL
Qty: 90 TABLET | Refills: 0 | Status: SHIPPED | OUTPATIENT
Start: 2022-05-17 | End: 2022-09-06

## 2022-05-17 NOTE — TELEPHONE ENCOUNTER
Prescription approved per [facility] Refill Protocol.  Jose Jacques, BSN, RN, PHN  [facility] RN for [facility] [place]/[facility] [employer] [place]  May 17, 2022

## 2022-05-26 ENCOUNTER — TELEPHONE (OUTPATIENT)
Dept: NEUROSURGERY | Facility: CLINIC | Age: 82
End: 2022-05-26

## 2022-05-26 ENCOUNTER — TELEPHONE (OUTPATIENT)
Dept: PALLIATIVE MEDICINE | Facility: CLINIC | Age: 82
End: 2022-05-26

## 2022-05-26 DIAGNOSIS — N32.81 OAB (OVERACTIVE BLADDER): ICD-10-CM

## 2022-05-26 DIAGNOSIS — K21.9 GASTROESOPHAGEAL REFLUX DISEASE: ICD-10-CM

## 2022-05-26 DIAGNOSIS — M54.16 LUMBAR RADICULOPATHY: Primary | ICD-10-CM

## 2022-05-26 NOTE — TELEPHONE ENCOUNTER
Patient presented to clinic thinking she was here for another injection. Writer presented to the exam room for assessment.    Patient called clinic requesting to repeat injection.    Type of injection: right L4-5 & L5-S1 Epidural Steroid Injection      Most recent injection date: 5/27/22    Most recent MRI: 5/3/21    How long did injection provide symptomatic relief: 4 months    Current symptoms: low back pain. Mostly on right side. Medial aspect of right knee when lifting knee. Difficulty standing for any length of time. Location/severity of the pain is the same as previous injection (pain 7/10).    Number of injections in last 12 months: 1    Plan: repeat injection discussed with Rhina Gregory DNP who ok'd repeat. Orders placed. Messaged SDS scheduling to contact patient.    Patient voiced understanding and agreement with plan.     Advised patient to call back with any questions or concerns.    Mansi Quinonez RN on 5/26/2022 at 12:23 PM

## 2022-05-26 NOTE — TELEPHONE ENCOUNTER
Pt scheduled for BLANCA   Date: 6/17/22  Time: 100p  Dr. Mcclure    Instructed pt to have a  for procedure.

## 2022-05-27 RX ORDER — PANTOPRAZOLE SODIUM 40 MG/1
TABLET, DELAYED RELEASE ORAL
Qty: 180 TABLET | Refills: 0 | Status: SHIPPED | OUTPATIENT
Start: 2022-05-27 | End: 2022-08-29

## 2022-05-27 RX ORDER — OXYBUTYNIN CHLORIDE 10 MG/1
TABLET, EXTENDED RELEASE ORAL
Qty: 90 TABLET | Refills: 3 | OUTPATIENT
Start: 2022-05-27

## 2022-05-27 NOTE — TELEPHONE ENCOUNTER
"Pending Prescriptions:                       Disp   Refills    pantoprazole (PROTONIX) 40 MG EC tablet [P*180 ta*1        Sig: TAKE ONE TABLET BY MOUTH TWICE A DAY  Refused Prescriptions:                       Disp   Refills    oxybutynin ER (DITROPAN XL) 10 MG 24 hr ta*90 tab*3        Sig: TAKE ONE TABLET BY MOUTH ONCE DAILY  Refused By: DONTA BETTS  Reason for Refusal: Patient has requested refill too soon  Reason for Refusal Comment: 90 day supply sent 5/17/22    Routing refill request to provider for review/approval because:  A break in medication, Should have been due for refills in Feb 2022.   pantoprazole (PROTONIX) 40 MG EC tablet 180 tablet 1 8/20/2021  No   Sig - Route: Take 1 tablet (40 mg) by mouth 2 times daily - Oral   Sent to pharmacy as: Pantoprazole Sodium 40 MG Oral Tablet Delayed Release (PROTONIX)   Class: E-Prescribe   Order: [id number]   E-Prescribing Status: Receipt confirmed by pharmacy (8/20/2021 12:38 PM CDT)       Requested Prescriptions   Pending Prescriptions Disp Refills    pantoprazole (PROTONIX) 40 MG EC tablet [Pharmacy Med Name: PANTOPRAZOLE SODIUM 40MG TBEC] 180 tablet 1     Sig: TAKE ONE TABLET BY MOUTH TWICE A DAY        PPI Protocol Passed - 5/26/2022 10:23 AM        Passed - Not on Clopidogrel (unless Pantoprazole ordered)        Passed - No diagnosis of osteoporosis on record        Passed - Recent (12 mo) or future (30 days) visit within the authorizing provider's specialty     Patient has had an office visit with the authorizing provider or a provider within the authorizing providers department within the previous 12 mos or has a future within next 30 days. See \"Patient Info\" tab in inbasket, or \"Choose Columns\" in Meds & Orders section of the refill encounter.              Passed - Medication is active on med list        Passed - Patient is age 18 or older        Passed - No active pregnacy on record        Passed - No positive pregnancy test in past 12 months        " "  Refused Prescriptions Disp Refills    oxybutynin ER (DITROPAN XL) 10 MG 24 hr tablet [Pharmacy Med Name: OXYBUTYNIN CHLORIDE ER 10MG TB24] 90 tablet 3     Sig: TAKE ONE TABLET BY MOUTH ONCE DAILY        Muscarinic Antagonists (Urinary Incontinence Agents) Passed - 5/26/2022 10:23 AM        Passed - Recent (12 mo) or future (30 days) visit within the authorizing provider's specialty     Patient has had an office visit with the authorizing provider or a provider within the authorizing providers department within the previous 12 mos or has a future within next 30 days. See \"Patient Info\" tab in inbasket, or \"Choose Columns\" in Meds & Orders section of the refill encounter.              Passed - Medication is Oxybutynin and patient is 5 years of age or older        Passed - Patient does not have a diagnosis of glaucoma on the problem list     If glaucoma diagnosis is new, refer refill to physician.          Passed - Medication is active on med list        Passed - Patient is 18 years of age or older                  "

## 2022-06-03 ENCOUNTER — TELEPHONE (OUTPATIENT)
Dept: FAMILY MEDICINE | Facility: OTHER | Age: 82
End: 2022-06-03
Payer: COMMERCIAL

## 2022-06-03 NOTE — TELEPHONE ENCOUNTER
Pt calls for refill of pantoprazole. She states that she was just at the pharmacy and they said that they didn't have it. Advised her that it was sent 5/27/22 and it shows that pharmacy confirmed receipt of this. Pt will contact the pharmacy to inquire and will call back if she has any further questions.    Sendy Choi, JOSE RN, RN, PHN  Registered Nurse-Clinic Triage  [facility]  6/3/2022 at 10:37 AM

## 2022-06-03 NOTE — TELEPHONE ENCOUNTER
Patient called stating that the pharmacy never got the order. Called pharmacy and confirmed that they did not have it. Our records show that we have a confirmed receipt from [facility] orders by fax

## 2022-06-17 ENCOUNTER — HOSPITAL ENCOUNTER (OUTPATIENT)
Facility: CLINIC | Age: 82
Discharge: HOME | End: 2022-06-17
Attending: ANESTHESIOLOGY | Admitting: ANESTHESIOLOGY
Payer: COMMERCIAL

## 2022-06-17 ENCOUNTER — HOSPITAL ENCOUNTER (OUTPATIENT)
Dept: GENERAL RADIOLOGY | Facility: CLINIC | Age: 82
Discharge: HOME | End: 2022-06-17
Attending: ANESTHESIOLOGY | Admitting: ANESTHESIOLOGY
Payer: COMMERCIAL

## 2022-06-17 VITALS
TEMPERATURE: 98 F | OXYGEN SATURATION: 90 % | RESPIRATION RATE: 16 BRPM | HEART RATE: 65 BPM | DIASTOLIC BLOOD PRESSURE: 81 MMHG | SYSTOLIC BLOOD PRESSURE: 160 MMHG

## 2022-06-17 DIAGNOSIS — M54.16 LUMBAR RADICULOPATHY: ICD-10-CM

## 2022-06-17 PROCEDURE — 250N000011 HC RX IP 250 OP 636: Performed by: ANESTHESIOLOGY

## 2022-06-17 PROCEDURE — 999N000179 XR SURGERY CARM FLUORO LESS THAN 5 MIN W STILLS: Mod: TC

## 2022-06-17 PROCEDURE — 64483 NJX AA&/STRD TFRM EPI L/S 1: CPT | Mod: RT | Performed by: ANESTHESIOLOGY

## 2022-06-17 PROCEDURE — 64484 NJX AA&/STRD TFRM EPI L/S EA: CPT | Performed by: ANESTHESIOLOGY

## 2022-06-17 RX ORDER — IOPAMIDOL 612 MG/ML
INJECTION, SOLUTION INTRATHECAL PRN
Status: DISCONTINUED | OUTPATIENT
Start: 2022-06-17 | End: 2022-06-17 | Stop reason: HOSPADM

## 2022-06-17 RX ORDER — METHYLPREDNISOLONE ACETATE 40 MG/ML
INJECTION, SUSPENSION INTRA-ARTICULAR; INTRALESIONAL; INTRAMUSCULAR; SOFT TISSUE PRN
Status: DISCONTINUED | OUTPATIENT
Start: 2022-06-17 | End: 2022-06-17 | Stop reason: HOSPADM

## 2022-06-17 NOTE — DISCHARGE INSTRUCTIONS

## 2022-06-17 NOTE — OP NOTE
PRIMARY PROBLEM: Low back pain    PROCEDURE: Attempted right L4-5 and L5-S1 epidural injections with completion of only the right L5-S1 Transforaminal Epidural Steroid Injection with fluoroscopic guidance and contrast.     PROCEDURE DETAILS: After written informed consent was obtained from the patient, the patient was escorted to the procedure room.  The patient was placed in the prone position.  A  time out  was conducted to verify patient identity, procedure to be performed, side, site, allergies and any special requirements.  The skin over the thoracolumbar region was prepped and draped in normal sterile fashion with ChloraPrep. Fluoroscopy was used to identify the neural foramen in AP view and the skin was anesthetized with 2 mL of 1% lidocaine with bicarbonate buffer.  2 separate 22-gauge Quincke needles were attempted to be advanced in towards her right L4-5 and L5-S1 foraminal openings.  She had severely degenerative changes in her back therefore making it very difficult to do the injection.  Nonetheless the needles were walked into the posterior aspect of the foramens in the lateral fluoroscopic image.  In the AP image Omnipaque contrast was injected and the needles were manipulated until I had spread at the right L5-S1 transforaminal location.  However at the right L4-5 level I continue to not have very good epidural spread and it would point she had significant pain that went into her leg for about 5 seconds therefore the needle was assumed to be along the nerve root.  Despite that the contrast did not show epidural spread therefore the L4-5 epidural needle was removed.  I did place a separate Touhy needle into the L5-S1 right paramedian epidural interspace with loss resistance with saline.  Both at the L5-S1 transforaminal location as well as the L5-S1 paramedian location there was good epidural spread with no evidence of any intravascular, intrathecal, intraneural injection.  I then through each needle  injected 20 mg of Depo-Medrol with 3 cc of preservative-free normal saline with good dispersion covering the L4-5 and L5-S1 segments.  The patient was monitored with blood pressure and pulse oximetry machines with the assistance of an RN throughout the procedure.  The patient was alert and responsive to questions throughout the procedure.   The patient tolerated the procedure well and was observed in the post-procedural area.  The patient was dismissed without apparent complications.     BLOOD LOSS: < 5 cc    DIAGNOSIS:  1.  Severe degenerative changes from L1-S1 with the worst degeneration being at the L4-5 and L5-S1 levels  2.  Spondylolisthesis at L4-5 causing lateral recess stenosis and central stenosis    PLAN:  1. Performed a right L5-S1  transforaminal epidural steroid injection.  2. The patient was instructed to follow-up per Dr. Mcclure's instructions.      Gilbert Mcclure MD  Diplomate of the American Board of Anesthesiology, Pain Medicine

## 2022-06-22 ENCOUNTER — TRANSFERRED RECORDS (OUTPATIENT)
Dept: HEALTH INFORMATION MANAGEMENT | Facility: CLINIC | Age: 82
End: 2022-06-22

## 2022-06-22 LAB — RETINOPATHY: NEGATIVE

## 2022-06-23 NOTE — TELEPHONE ENCOUNTER
Patient is taking a water pill (furosemide) and feels it is not doing much to help her out. Wondering if she should continue with medication.     Then asking if CDL can also prescribe the oxybutynin for her that Dr. Baez usually does? That is all he does is that medication and she would like to have them all prescribed by 1 person if possible.    [phone] - don't leave message as she does not know how to retreive them.    Yes

## 2022-06-27 ENCOUNTER — TELEPHONE (OUTPATIENT)
Dept: FAMILY MEDICINE | Facility: OTHER | Age: 82
End: 2022-06-27

## 2022-06-27 DIAGNOSIS — E87.6 HYPOKALEMIA: ICD-10-CM

## 2022-06-27 DIAGNOSIS — M54.16 LUMBAR RADICULOPATHY: Primary | ICD-10-CM

## 2022-06-27 RX ORDER — POTASSIUM CHLORIDE 750 MG/1
TABLET, EXTENDED RELEASE ORAL
Qty: 180 TABLET | Refills: 4 | Status: SHIPPED | OUTPATIENT
Start: 2022-06-27 | End: 2023-05-02

## 2022-06-27 NOTE — TELEPHONE ENCOUNTER
"Pt states that she was seen for back injection on 6/17/22. She states that at first she has been fine but now she is having some soreness because she feels like she \"overdid it\" on Friday doing chair exercises where she lives. She states that JR noted at last visit (5/13/22) some options that she could try for her lumbar radiculopathy. Pt states that gabapentin doesn't work but he had listed several other options. She would now like to try the muscle relaxant option he recommended. She has not yet looked into the CBD options and would like to try the muscle relaxant first.    Pharmacy-[facility].     Please call pt when completed or if provider is unable.     Sendy Choi, JOSE RN, RN, PHN  Registered Nurse-Clinic Triage  [facility]/[place]  6/27/2022 at 10:57 AM    "

## 2022-06-28 RX ORDER — DULOXETIN HYDROCHLORIDE 20 MG/1
20 CAPSULE, DELAYED RELEASE ORAL DAILY
Qty: 30 CAPSULE | Refills: 1 | Status: SHIPPED | OUTPATIENT
Start: 2022-06-28 | End: 2022-08-04

## 2022-06-28 NOTE — TELEPHONE ENCOUNTER
Please call patient to inform her that I sent out the duloxetine we discussed at her last visit. This medication is to be taken once daily and can take 2-4 weeks to provide full benefit. It can cause upset stomach for the first week or so, but this should go away. Please have her monitor for intolerance (eg persistent upset stomach, dizziness, feeling tired, etc). I recommend follow up with myself or PCP in 1-2 months to re-evaluate.    Fareed Roldan PA-C on 6/28/2022 at 2:05 PM

## 2022-08-01 ENCOUNTER — NURSE TRIAGE (OUTPATIENT)
Dept: FAMILY MEDICINE | Facility: OTHER | Age: 82
End: 2022-08-01

## 2022-08-01 NOTE — TELEPHONE ENCOUNTER
Friday night sweats and dry heaves. No vomiting.     Saturday sore throat started and headache. No difficulty swallowing or breathing just throat sore.    Home covid test done Saturday and it was negative.      No fevers. No body aches, chills.  No funny nose.  Just tired from not being able to sleep because its hot in her apartment.      Not around anyone with any type of illness.     Hurts when you swallow but does not restrict of swallowing.      HOME CARE:   * You should be able to treat this at home.    PAIN AND FEVER MEDICINES:  * For pain or fever relief, take either acetaminophen or ibuprofen.    FOR RELIEF OF SORE THROAT PAIN:  * Sip warm chicken broth or apple juice.  * Suck on hard candy or a throat lozenge (over-the-counter).  * Gargle warm salt water 3 times daily (1 teaspoon of salt in 8 oz or 240 ml of warm water).      Reason for Disposition    Sore throat    Additional Information    Negative: Severe difficulty breathing (e.g., struggling for each breath, speaks in single words)    Negative: Sounds like a life-threatening emergency to the triager    Negative: Throat culture results, call about    Negative: Productive cough is the main symptom    Negative: Runny nose is the main symptom    Negative: Drooling or spitting out saliva (because can't swallow)    Negative: Unable to open mouth completely    Negative: Drinking very little and has signs of dehydration (e.g., no urine > 12 hours, very dry mouth, very lightheaded)    Negative: Patient sounds very sick or weak to the triager    Negative: Difficulty breathing (per caller) but not severe    Negative: Fever > 103 F (39.4 C)    Negative: Refuses to drink anything for > 12 hours    Negative: SEVERE sore throat pain    Negative: Pus on tonsils (back of throat) and swollen neck lymph nodes ('glands')    Negative: Earache also present    Negative: Widespread rash (especially chest and abdomen)    Negative: Diabetes mellitus or weak immune system (e.g.,  HIV positive, cancer chemo, splenectomy, organ transplant, chronic steroids)    Negative: History of rheumatic fever    Negative: Patient wants to be seen    Negative: Patient requesting a strep throat test    Negative: Strep exposure within last 10 days    Negative: Sore throat is the main symptom and persists > 48 hours    Negative: Sore throat with cough/cold symptoms present > 5 days    Negative: Fever present > 3 days (72 hours)    Protocols used: SORE THROAT-A-OH

## 2022-08-01 NOTE — TELEPHONE ENCOUNTER
Reason for call:  Patient reporting a symptom    Symptom or request: Sore throat    Duration (how long have symptoms been present): a couple of days    Have you been treated for this before? No    Additional comments: Patient states she has a bad sore throat and would like to speak with a nurse. Please call.    Phone Number patient can be reached at:  Home number on file [phone] (home)    Best Time:  any    Can we leave a detailed message on this number:  No    Call taken on 8/1/2022 at 10:04 AM by Maia Sagastume

## 2022-08-04 ENCOUNTER — OFFICE VISIT (OUTPATIENT)
Dept: FAMILY MEDICINE | Facility: OTHER | Age: 82
End: 2022-08-04
Payer: COMMERCIAL

## 2022-08-04 VITALS
HEART RATE: 60 BPM | BODY MASS INDEX: 34.75 KG/M2 | TEMPERATURE: 98.1 F | WEIGHT: 207 LBS | DIASTOLIC BLOOD PRESSURE: 77 MMHG | SYSTOLIC BLOOD PRESSURE: 138 MMHG | OXYGEN SATURATION: 93 %

## 2022-08-04 DIAGNOSIS — J02.9 SORE THROAT: ICD-10-CM

## 2022-08-04 DIAGNOSIS — R50.9 FEVER, UNSPECIFIED FEVER CAUSE: ICD-10-CM

## 2022-08-04 DIAGNOSIS — M54.16 LUMBAR RADICULOPATHY: Primary | ICD-10-CM

## 2022-08-04 LAB
DEPRECATED S PYO AG THROAT QL EIA: NEGATIVE
GROUP A STREP BY PCR: NOT DETECTED

## 2022-08-04 PROCEDURE — U0005 INFEC AGEN DETEC AMPLI PROBE: HCPCS | Performed by: PHYSICIAN ASSISTANT

## 2022-08-04 PROCEDURE — U0003 INFECTIOUS AGENT DETECTION BY NUCLEIC ACID (DNA OR RNA); SEVERE ACUTE RESPIRATORY SYNDROME CORONAVIRUS 2 (SARS-COV-2) (CORONAVIRUS DISEASE [COVID-19]), AMPLIFIED PROBE TECHNIQUE, MAKING USE OF HIGH THROUGHPUT TECHNOLOGIES AS DESCRIBED BY CMS-2020-01-R: HCPCS | Performed by: PHYSICIAN ASSISTANT

## 2022-08-04 PROCEDURE — 99214 OFFICE O/P EST MOD 30 MIN: CPT | Mod: CS | Performed by: PHYSICIAN ASSISTANT

## 2022-08-04 PROCEDURE — 87651 STREP A DNA AMP PROBE: CPT | Performed by: PHYSICIAN ASSISTANT

## 2022-08-04 RX ORDER — AMITRIPTYLINE HYDROCHLORIDE 10 MG/1
10 TABLET ORAL AT BEDTIME
Qty: 30 TABLET | Refills: 1 | Status: SHIPPED | OUTPATIENT
Start: 2022-08-04 | End: 2022-09-09

## 2022-08-04 ASSESSMENT — PAIN SCALES - GENERAL: PAINLEVEL: MILD PAIN (2)

## 2022-08-04 NOTE — PROGRESS NOTES
"  Assessment & Plan     Lumbar radiculopathy  Patient did not feel that duloxetine was helpful and was not interested in a dose increase at this time. She would like to try an alternative for her ongoing back pain. She had BLANCA this past June 2022, but does not feel that this has been overly helpful. Reviewed amitriptyline and the possible adverse effects of the medication. She will start at a low dose and will follow up in 1-2 months. Can increase the dose as the patient tolerates.   - amitriptyline (ELAVIL) 10 MG tablet; Take 1 tablet (10 mg) by mouth At Bedtime    Fever, unspecified fever cause  Sore throat  Workup negative for infection. Unclear cause for the patient's fever. Will have her continue to monitor. If fever continues to recur she will call. Can consider repeating cell count at that time. Due for colonoscopy this next year 2023.   - Symptomatic; Unknown COVID-19 Virus (Coronavirus) by PCR Nose  - Streptococcus A Rapid Screen w/Reflex to PCR - Clinic Collect  - Group A Streptococcus PCR Throat Swab     BMI:   Estimated body mass index is 34.75 kg/m  as calculated from the following:    Height as of 5/26/22: 1.644 m (5' 4.72\").    Weight as of this encounter: 93.9 kg (207 lb).        Return in about 2 months (around 10/4/2022) for Medication Recheck.    SAILAJA Del Toro [facility]    Wan Rqoue is a 82 year old, presenting for the following health issues:  Imm/Inj (Back, 2 injections, sacral completed, lumbar unable to complete)      History of Present Illness       Back Pain:  She presents for follow up of back pain. Patient's back pain is a recurring problem.  Location of back pain:  Right lower back  Description of back pain: dull ache, sharp and shooting  Back pain spreads: right buttocks, right thigh and right knee    Since patient first noticed back pain, pain is: unchanged  Does back pain interfere with her job:  Not applicable      She eats 0-1 servings of " fruits and vegetables daily.She consumes 0 sweetened beverage(s) daily.She exercises with enough effort to increase her heart rate 9 or less minutes per day.  She exercises with enough effort to increase her heart rate 3 or less days per week.   She is taking medications regularly.       Fever Tuesday morning, took 3 ibuprofen. She napped/rested the rest of that morning. Fever resolved by that afternoon/evening. Sore throat has been present off/on since then. Feels that her appetite is normal. Denies changs to bladder/bowel, breathing difficulties, headaches, changes to hearing/vision, weight changes or low energy.     She is waiting on new lenses for her glasses. These are expected sometime in the next month. Working with [email or web address] in [place].       Review of Systems   Constitutional, HEENT, cardiovascular, pulmonary, gi and gu systems are negative, except as otherwise noted.      Objective    /77 (Cuff Size: Adult Large)   Pulse 60   Temp 98.1  F (36.7  C) (Oral)   Wt 93.9 kg (207 lb)   LMP  (LMP Unknown)   SpO2 93%   BMI 34.75 kg/m    Body mass index is 34.75 kg/m .  Physical Exam   GENERAL: healthy, alert and no distress  EYES: Eyes grossly normal to inspection, PERRL and conjunctivae and sclerae normal  HENT: ear canals and TM's normal, nose and mouth without ulcers or lesions  NECK: no adenopathy, no asymmetry, masses, or scars and thyroid normal to palpation  RESP: lungs clear to auscultation - no rales, rhonchi or wheezes  CV: regular rate and rhythm, normal S1 S2, no S3 or S4, no murmur, click or rub, no peripheral edema and peripheral pulses strong  ABDOMEN: soft, nontender, no hepatosplenomegaly, no masses and bowel sounds normal  MS: no gross musculoskeletal defects noted, no edema  NEURO: Normal strength and tone, mentation intact and speech normal  PSYCH: mentation appears normal, affect normal/bright    Results for orders placed or performed in visit on 08/04/22   Symptomatic;  Unknown COVID-19 Virus (Coronavirus) by PCR Nose     Status: Normal    Specimen: Nose; Swab   Result Value Ref Range    SARS CoV2 PCR Negative Negative    Narrative    Testing was performed using the jr SARS-CoV-2 assay on the jr  [employer] System. This test should be ordered for the detection of  SARS-CoV-2 in individuals who meet SARS-CoV-2 clinical and/or  epidemiological criteria. Test performance is unknown in asymptomatic  patients. This test is for in vitro diagnostic use under the FDA EUA  for laboratories certified under CLIA to perform high and/or moderate  complexity testing. This test has not been FDA cleared or approved. A  negative result does not rule out the presence of PCR inhibitors in  the specimen or target RNA in concentration below the limit of  detection for the assay. The possibility of a false negative should  be considered if the patient's recent exposure or clinical  presentation suggests COVID-19. This test was validated by the [facility]. This  laboratory is certified under the Clinical Laboratory Improvement  Amendments of 1988 (CLIA-88) as qualified to perform high and/or  moderate complexity laboratory testing.   Streptococcus A Rapid Screen w/Reflex to PCR - Clinic Collect     Status: Normal    Specimen: Throat; Swab   Result Value Ref Range    Group A Strep antigen Negative Negative   Group A Streptococcus PCR Throat Swab     Status: Normal    Specimen: Throat; Swab   Result Value Ref Range    Group A strep by PCR Not Detected Not Detected    Narrative    The Xpert Xpress Strep A test, performed on the [employer], is a rapid, qualitative in vitro diagnostic test for the detection of Streptococcus pyogenes (Group A ß-hemolytic Streptococcus, Strep A) in throat swab specimens from patients with signs and symptoms of pharyngitis. The Xpert Xpress Strep A test can be used as an aid in the diagnosis of Group A  Streptococcal pharyngitis. The assay is not intended to monitor treatment for Group A Streptococcus infections. The Xpert Xpress Strep A test utilizes an automated real-time polymerase chain reaction (PCR) to detect Streptococcus pyogenes DNA.

## 2022-08-05 LAB — SARS-COV-2 RNA RESP QL NAA+PROBE: NEGATIVE

## 2022-08-05 NOTE — RESULT ENCOUNTER NOTE
Spoke with patient, relayed providers message.   Scheduled follow up appointment  María Elena Nevarez MA on 8/5/2022 at 1:21 PM

## 2022-08-08 ENCOUNTER — TELEPHONE (OUTPATIENT)
Dept: FAMILY MEDICINE | Facility: OTHER | Age: 82
End: 2022-08-08

## 2022-08-08 NOTE — TELEPHONE ENCOUNTER
Yes, patient can use an available same day or approval required slot this week.    Fareed Roldan PA-C on 8/8/2022 at 5:25 PM

## 2022-08-08 NOTE — TELEPHONE ENCOUNTER
Spoke with Mansi George NP from House calls program.    Mansi stated that patient currently has an regular irregular rhythm which is a change from OV on 8/4/22.  Mansi also noted a systolic murmur low grade.      /64   HR 66.    No chest pain or shortness of breath.  NP stated patient looks and feels great.  Mansi also went over emergency things with patient.  Like if patient starts having chest pain or shortness of breath to call 911.    Mansi George NP: House calls program  Phone: [phone]    Do you want to work patient in or what do you recommend?    Emi Vance RN  [facility] ~ Registered Nurse  Clinic Triage ~ [place] [facility] [place]  August 8, 2022

## 2022-08-12 ENCOUNTER — OFFICE VISIT (OUTPATIENT)
Dept: FAMILY MEDICINE | Facility: OTHER | Age: 82
End: 2022-08-12
Payer: COMMERCIAL

## 2022-08-12 VITALS
TEMPERATURE: 97.9 F | OXYGEN SATURATION: 95 % | SYSTOLIC BLOOD PRESSURE: 146 MMHG | HEART RATE: 57 BPM | BODY MASS INDEX: 35.25 KG/M2 | DIASTOLIC BLOOD PRESSURE: 83 MMHG | WEIGHT: 210 LBS

## 2022-08-12 DIAGNOSIS — E11.51 DIABETES MELLITUS WITH PERIPHERAL VASCULAR DISEASE (H): ICD-10-CM

## 2022-08-12 DIAGNOSIS — E66.01 MORBID OBESITY (H): ICD-10-CM

## 2022-08-12 DIAGNOSIS — E03.9 HYPOTHYROIDISM, UNSPECIFIED TYPE: ICD-10-CM

## 2022-08-12 DIAGNOSIS — I10 ESSENTIAL HYPERTENSION: ICD-10-CM

## 2022-08-12 DIAGNOSIS — I49.9 IRREGULAR HEART RATE: Primary | ICD-10-CM

## 2022-08-12 DIAGNOSIS — I49.8 BIGEMINY: ICD-10-CM

## 2022-08-12 DIAGNOSIS — E78.2 MIXED HYPERLIPIDEMIA: ICD-10-CM

## 2022-08-12 PROCEDURE — 93000 ELECTROCARDIOGRAM COMPLETE: CPT | Performed by: PHYSICIAN ASSISTANT

## 2022-08-12 PROCEDURE — 99214 OFFICE O/P EST MOD 30 MIN: CPT | Performed by: PHYSICIAN ASSISTANT

## 2022-08-12 ASSESSMENT — PAIN SCALES - GENERAL: PAINLEVEL: EXTREME PAIN (9)

## 2022-08-12 NOTE — PROGRESS NOTES
Assessment & Plan     Irregular heart rate  Sue  Patient was asked to schedule visit as a home care service informed her that an irregular rhythm was identified while performing vitals. The NP also reported a soft murmur, this was not heard on exam today. The patient is asymptomatic and denies symptoms at any time. EKG returned with concern for sue today. I reviewed this with a colleague of mine regarding whether the low dose amitriptyline could have caused an arrhythmia. This was felt to be less likely given the low dose and short timeframe the medication has been present. There was also a concern as to whether the patient is no longer tolerating the diltiazem being used as an antihypertensive. I discussed need for further evaluation with johanna and follow up with cardiology. Patient was educated on arrhythmias as well as alarm symptoms to monitor for.   - EKG 12-lead complete w/read - Clinics  - Adult Leadless EKG Monitor 3 to 7 Days; Future  - Adult Cardiology Eval  Referral; Future    Essential hypertension  BP is elevated today at 146/83, patient attributes this to anxiety. She has a history of elevated BP over the summer. We discussed repeating BP with FLOAT nurse in 1-2 weeks. If persistently elevated consider additional antihypertensive.     Diabetes mellitus with peripheral vascular disease (H)  Patient has been within target range for sugars. Due for repeat A1c this Nov 2022.     Mixed hyperlipidemia  Patient has been taking statin without adverse effect. Most recent check in 05/2022 shows that the patient's triglycerides have been improving.     Hypothyroidism, unspecified type  Thyroid levels are within normal range. Taking levothyroxine without missing a dose.     Morbid obesity (H)  Patient was advised to continue working on weight loss through healthy diet and daily exercise.     Fareed Roldan PA-C  [facility] 82 [facility],  presenting for the following health issues:  Heart Problem (Irregular heartbeat)      Heart Problem    History of Present Illness       Back Pain:  She presents for follow up of back pain. Patient's back pain is a recurring problem.  Location of back pain:  Right lower back  Description of back pain: dull ache, sharp and shooting  Back pain spreads: right buttocks, right thigh and right knee    Since patient first noticed back pain, pain is: unchanged  Does back pain interfere with her job:  Not applicable      She eats 0-1 servings of fruits and vegetables daily.She consumes 0 sweetened beverage(s) daily.She exercises with enough effort to increase her heart rate 9 or less minutes per day.  She exercises with enough effort to increase her heart rate 3 or less days per week.   She is taking medications regularly.       Patient is an 82 year old female who presents today with concerns about an irregular rhythm detected by Mansi ibarra NP from House calls program. The patient informs me that she has not felt any symptoms such as palpitations, chest pain/pressure, dizziness, light headed, changes to breathing, nausea, weakness or low energy. A past PCP had started the patient on diltiazem, in 2011, for management of hypertension. She has been stable on this medication for some time. I do not see that any qualifying comorbidity exists such as an arrhythmia. The only new medication change for the patient is that was started on amitriptyline 1 week ago. She has not had any recurrence of the fevers reported on visit from 08/04 nor has there been any injuries or changes to diet.     Review of Systems   Constitutional, HEENT, cardiovascular, pulmonary, gi and gu systems are negative, except as otherwise noted.      Objective    BP (!) 146/83 (Cuff Size: Adult Large)   Pulse 57   Temp 97.9  F (36.6  C) (Oral)   Wt 95.3 kg (210 lb)   LMP  (LMP Unknown)   SpO2 95%   BMI 35.25 kg/m    Body mass index is 35.25  kg/m .  Physical Exam   GENERAL: healthy, alert and no distress  EYES: Eyes grossly normal to inspection, PERRL and conjunctivae and sclerae normal  HENT: ear canals and TM's normal, nose and mouth without ulcers or lesions  NECK: no adenopathy, no asymmetry, masses, or scars and thyroid normal to palpation  RESP: lungs clear to auscultation - no rales, rhonchi or wheezes  CV: regular rate and rhythm, normal S1 S2, no S3 or S4, no murmur, click or rub, no peripheral edema and peripheral pulses strong  MS: no gross musculoskeletal defects noted, no edema  NEURO: Normal strength and tone, mentation intact and speech normal  PSYCH: mentation appears normal, affect normal/bright    EKG - Bigeminy, normal axis, normal intervals, no acute ST/T changes c/w ischemia, no LVH by voltage criteria

## 2022-08-14 PROBLEM — E66.01 MORBID OBESITY (H): Status: ACTIVE | Noted: 2022-08-14

## 2022-08-16 ENCOUNTER — HOSPITAL ENCOUNTER (OUTPATIENT)
Dept: CARDIOLOGY | Facility: CLINIC | Age: 82
Discharge: HOME | End: 2022-08-16
Attending: PHYSICIAN ASSISTANT | Admitting: PHYSICIAN ASSISTANT
Payer: COMMERCIAL

## 2022-08-16 DIAGNOSIS — I49.8 BIGEMINY: ICD-10-CM

## 2022-08-16 DIAGNOSIS — I49.9 IRREGULAR HEART RATE: ICD-10-CM

## 2022-08-16 PROCEDURE — 93242 EXT ECG>48HR<7D RECORDING: CPT

## 2022-08-18 ENCOUNTER — TELEPHONE (OUTPATIENT)
Dept: FAMILY MEDICINE | Facility: OTHER | Age: 82
End: 2022-08-18

## 2022-08-18 NOTE — TELEPHONE ENCOUNTER
"Patient calling with concerns of the \"tape falling off from holter monitor.\"    Gave patient phone number to department that placed ziopatch monitor to call them and have assistance on reinforcing the tape in the correct way.       JOSE R WilsonN, RN  [facility]  August 18, 2022    "

## 2022-08-23 DIAGNOSIS — K21.9 GASTROESOPHAGEAL REFLUX DISEASE: ICD-10-CM

## 2022-08-29 DIAGNOSIS — N32.81 OAB (OVERACTIVE BLADDER): ICD-10-CM

## 2022-08-29 RX ORDER — PANTOPRAZOLE SODIUM 40 MG/1
TABLET, DELAYED RELEASE ORAL
Qty: 180 TABLET | Refills: 0 | Status: SHIPPED | OUTPATIENT
Start: 2022-08-29 | End: 2022-11-28

## 2022-08-31 ENCOUNTER — HOSPITAL ENCOUNTER (EMERGENCY)
Facility: CLINIC | Age: 82
Discharge: HOME | End: 2022-08-31
Attending: EMERGENCY MEDICINE | Admitting: EMERGENCY MEDICINE
Payer: COMMERCIAL

## 2022-08-31 ENCOUNTER — APPOINTMENT (OUTPATIENT)
Dept: GENERAL RADIOLOGY | Facility: CLINIC | Age: 82
End: 2022-08-31
Attending: EMERGENCY MEDICINE
Payer: COMMERCIAL

## 2022-08-31 ENCOUNTER — APPOINTMENT (OUTPATIENT)
Dept: ULTRASOUND IMAGING | Facility: CLINIC | Age: 82
End: 2022-08-31
Attending: EMERGENCY MEDICINE
Payer: COMMERCIAL

## 2022-08-31 VITALS
SYSTOLIC BLOOD PRESSURE: 158 MMHG | WEIGHT: 208 LBS | TEMPERATURE: 97.8 F | OXYGEN SATURATION: 92 % | BODY MASS INDEX: 34.91 KG/M2 | RESPIRATION RATE: 18 BRPM | HEART RATE: 75 BPM | DIASTOLIC BLOOD PRESSURE: 91 MMHG

## 2022-08-31 DIAGNOSIS — M25.561 ACUTE PAIN OF RIGHT KNEE: ICD-10-CM

## 2022-08-31 PROCEDURE — 96376 TX/PRO/DX INJ SAME DRUG ADON: CPT | Performed by: EMERGENCY MEDICINE

## 2022-08-31 PROCEDURE — 258N000003 HC RX IP 258 OP 636: Performed by: EMERGENCY MEDICINE

## 2022-08-31 PROCEDURE — 73562 X-RAY EXAM OF KNEE 3: CPT | Mod: RT

## 2022-08-31 PROCEDURE — 93971 EXTREMITY STUDY: CPT | Mod: RT

## 2022-08-31 PROCEDURE — 96361 HYDRATE IV INFUSION ADD-ON: CPT | Performed by: EMERGENCY MEDICINE

## 2022-08-31 PROCEDURE — 250N000011 HC RX IP 250 OP 636: Performed by: FAMILY MEDICINE

## 2022-08-31 PROCEDURE — 250N000011 HC RX IP 250 OP 636: Performed by: EMERGENCY MEDICINE

## 2022-08-31 PROCEDURE — 96374 THER/PROPH/DIAG INJ IV PUSH: CPT | Performed by: EMERGENCY MEDICINE

## 2022-08-31 PROCEDURE — 96375 TX/PRO/DX INJ NEW DRUG ADDON: CPT | Performed by: EMERGENCY MEDICINE

## 2022-08-31 PROCEDURE — 99285 EMERGENCY DEPT VISIT HI MDM: CPT | Mod: 25 | Performed by: EMERGENCY MEDICINE

## 2022-08-31 PROCEDURE — 250N000013 HC RX MED GY IP 250 OP 250 PS 637: Performed by: EMERGENCY MEDICINE

## 2022-08-31 PROCEDURE — 99284 EMERGENCY DEPT VISIT MOD MDM: CPT | Performed by: EMERGENCY MEDICINE

## 2022-08-31 RX ORDER — ONDANSETRON 4 MG/1
4 TABLET, ORALLY DISINTEGRATING ORAL ONCE
Status: COMPLETED | OUTPATIENT
Start: 2022-08-31 | End: 2022-08-31

## 2022-08-31 RX ORDER — ONDANSETRON 2 MG/ML
4 INJECTION INTRAMUSCULAR; INTRAVENOUS ONCE
Status: COMPLETED | OUTPATIENT
Start: 2022-08-31 | End: 2022-08-31

## 2022-08-31 RX ORDER — SODIUM CHLORIDE 9 MG/ML
INJECTION, SOLUTION INTRAVENOUS CONTINUOUS
Status: DISCONTINUED | OUTPATIENT
Start: 2022-08-31 | End: 2022-09-01 | Stop reason: HOSPADM

## 2022-08-31 RX ORDER — PROCHLORPERAZINE MALEATE 5 MG
5 TABLET ORAL EVERY 6 HOURS PRN
Qty: 15 TABLET | Refills: 0 | Status: SHIPPED | OUTPATIENT
Start: 2022-08-31 | End: 2022-09-21

## 2022-08-31 RX ORDER — OXYCODONE AND ACETAMINOPHEN 5; 325 MG/1; MG/1
1 TABLET ORAL ONCE
Status: COMPLETED | OUTPATIENT
Start: 2022-08-31 | End: 2022-08-31

## 2022-08-31 RX ORDER — OXYCODONE AND ACETAMINOPHEN 5; 325 MG/1; MG/1
1 TABLET ORAL EVERY 6 HOURS PRN
Qty: 16 TABLET | Refills: 0 | Status: SHIPPED | OUTPATIENT
Start: 2022-08-31 | End: 2022-09-04

## 2022-08-31 RX ORDER — ONDANSETRON 4 MG/1
4 TABLET, ORALLY DISINTEGRATING ORAL EVERY 8 HOURS PRN
Qty: 3 TABLET | Refills: 0 | Status: SHIPPED | OUTPATIENT
Start: 2022-08-31 | End: 2022-09-03

## 2022-08-31 RX ADMIN — ONDANSETRON 4 MG: 2 INJECTION INTRAMUSCULAR; INTRAVENOUS at 20:49

## 2022-08-31 RX ADMIN — PROCHLORPERAZINE EDISYLATE 5 MG: 5 INJECTION INTRAMUSCULAR; INTRAVENOUS at 21:47

## 2022-08-31 RX ADMIN — ONDANSETRON 4 MG: 2 INJECTION INTRAMUSCULAR; INTRAVENOUS at 19:48

## 2022-08-31 RX ADMIN — ONDANSETRON 4 MG: 4 TABLET, ORALLY DISINTEGRATING ORAL at 19:15

## 2022-08-31 RX ADMIN — OXYCODONE HYDROCHLORIDE AND ACETAMINOPHEN 1 TABLET: 5; 325 TABLET ORAL at 17:53

## 2022-08-31 RX ADMIN — SODIUM CHLORIDE 1000 ML: 9 INJECTION, SOLUTION INTRAVENOUS at 19:48

## 2022-08-31 ASSESSMENT — ACTIVITIES OF DAILY LIVING (ADL)
ADLS_ACUITY_SCORE: 35

## 2022-08-31 NOTE — ED PROVIDER NOTES
"  History     Chief Complaint   Patient presents with     Knee Pain     HPI  Mya Hui is a 82 year old female who presents with severe right knee pain that began \"a while ago but got markedly worse last evening.  No recent injury.  No history of DVT.  She does not recall ever hearing about a Baker's cyst.  She has had previous bilateral lower extremity edema but that is currently not an issue.  She denies any chest pain shortness of breath or cough.  No fever or chills.  No history of gout.  She admits to osteoarthritis in many joints.  She has had previous steroid injections in her shoulder and in her lumbar back.  She has not taken anything for pain prior to arrival today.    Allergies:  Allergies   Allergen Reactions     Codeine Nausea     Fentanyl Nausea and Vomiting     VERY sensitive to most narcotics if not all.       Problem List:    Patient Active Problem List    Diagnosis Date Noted     Morbid obesity (H) 08/14/2022     Priority: Medium     Impacted cerumen of right ear 01/22/2021     Priority: Medium     Balance problems 01/22/2021     Priority: Medium     Tinnitus, right 01/22/2021     Priority: Medium     Chronic venous insufficiency 11/30/2020     Priority: Medium     Diabetes mellitus with peripheral vascular disease (H) 01/20/2020     Priority: Medium     CKD (chronic kidney disease) stage 3, GFR 30-59 ml/min (H) 01/20/2020     Priority: Medium     Bilateral lower extremity edema 09/24/2019     Priority: Medium     Urinary incontinence, unspecified type 07/17/2019     Priority: Medium     S/P laparoscopic cholecystectomy 04/03/2018     Priority: Medium     Toe anomaly 04/03/2018     Priority: Medium     Cherry angioma 04/03/2018     Priority: Medium     Gastroesophageal reflux disease, esophagitis presence not specified 09/14/2017     Priority: Medium     IMO Regulatory Load OCT 2020       Sliding hiatal hernia 09/11/2017     Priority: Medium     Calculus of gallbladder without cholecystitis " 09/11/2017     Priority: Medium     Rotator cuff arthropathy, right 08/28/2017     Priority: Medium     Chronic right shoulder pain 08/10/2017     Priority: Medium     Right shoulder pain, unspecified chronicity 08/10/2017     Priority: Medium     Dry mouth 08/10/2017     Priority: Medium     Primary osteoarthritis involving multiple joints 06/16/2017     Priority: Medium     Cataract, bilateral 03/30/2017     Priority: Medium     Type 2 diabetes mellitus without complication, without long-term current use of insulin (H) 02/16/2017     Priority: Medium     Hypothyroidism, unspecified type 02/14/2017     Priority: Medium     Insomnia, unspecified 02/14/2017     Priority: Medium     OAB (overactive bladder) 09/07/2016     Priority: Medium     Essential hypertension with goal blood pressure less than 140/90 09/07/2016     Priority: Medium     Essential hypertension 02/05/2016     Priority: Medium     Diabetes mellitus with stage 3 chronic kidney disease (H) 02/05/2016     Priority: Medium     Overview:   5/22/15: Creatinine at [place] 1.17, GFR 45  5/22/15: hgb A1C 6.6, microalbumin 7.92       Migraine without aura and without status migrainosus, not intractable 11/19/2015     Priority: Medium     Gastroesophageal reflux disease without esophagitis 11/19/2015     Priority: Medium     Hyperlipidemia 11/19/2015     Priority: Medium     Overview:   5/22/15: , Trig 165, HDL 47, LDL 68  5/18/16: , Trig 218, HDL 46, LDL55       Hypothyroidism 11/19/2015     Priority: Medium     Overview:   5/22/15: TSH 2.68       Osteoarthritis of hip 08/21/2014     Priority: Medium     Do you wish to do the replacement in the background? yes         DDD (degenerative disc disease), lumbar 08/21/2014     Priority: Medium     Hearing aid worn 05/30/2014     Priority: Medium     Right ear       Hip pain 09/27/2013     Priority: Medium     Sciatica of right side 06/28/2013     Priority: Medium     Asymptomatic carotid artery  stenosis 05/02/2013     Priority: Medium     Hyperlipidemia with target LDL less than 100      Priority: Medium     Diagnosis updated by automated process. Provider to review and confirm.       History of renal calculi      Priority: Medium     Nevus 04/10/2013     Priority: Medium     Do you wish to do the replacement in the background? yes         Arthritis      Priority: Medium        Past Medical History:    Past Medical History:   Diagnosis Date     Acute cholecystitis 3/23/2018     Acute kidney injury (H) 9/10/2017     Arthritis      Dehydration 9/4/2017     Diabetes type 2, controlled (H)      Elevated lactic acid level 9/10/2017     GERD (gastroesophageal reflux disease)      History of renal calculi      HTN, goal below 140/90      Hyperlipidaemia LDL goal < 100      Hypokalemia 9/3/2017     Hypothyroid      Insomnia      Left carotid stenosis      Migraine      Motion sickness      PONV (postoperative nausea and vomiting)      Sciatica of right side 6/28/2013     Type 2 diabetes mellitus without complication, without long-term current use of insulin (H) 2/16/2017       Past Surgical History:    Past Surgical History:   Procedure Laterality Date     BUNIONECTOMY      Right foot     CATARACT IOL, RT/LT Bilateral 2017     COLONOSCOPY  7/12/2013    Procedure: COLONOSCOPY;  Colonoscopy;  Surgeon: Giovany Espinoza MD;  Location: [facility]     COLONOSCOPY N/A 1/31/2019    Procedure: Combined Colonoscopy, Single Or Multiple Biopsy/Polypectomy By Biopsy;  Surgeon: Efren Osorio MD;  Location: [facility]     COLONOSCOPY WITH CO2 INSUFFLATION N/A 1/31/2019    Procedure: COLONOSCOPY WITH CO2 INSUFFLATION;  Surgeon: Efren Osorio MD;  Location: [facility]     FRACTURE TX, ANKLE RT/LT      Left ankle     HC CYSTOURETHROSCOPY W/ URETEROSCOPY &/OR PYELOSCOPY; W/ LITHOTRIPSY       HC REVISE MEDIAN N/CARPAL TUNNEL SURG      Right wrist     INJECT EPIDURAL LUMBAR Right 12/27/2018    Procedure: INJECT  EPIDURAL LUMBAR right foraminal narrowing severe at L4-L5 and moderate at L5-S1;  Surgeon: Gilbert Mcclure MD;  Location: [facility]     INJECT EPIDURAL TRANSFORAMINAL Bilateral 5/27/2021    Procedure: Bilateral Lumbar 5-sacral 1 Epidural steroid injection;  Surgeon: Gilbert Mcclure MD;  Location: [facility]     INJECT EPIDURAL TRANSFORAMINAL Right 6/17/2022    Procedure: Attempted right Lumbar 4-5 and Lumbar 5-Sacral 1 epidural injections with completion of only the right Lumbar 5-Sacral1 Transforaminal Epidural Steroid Injection with fluoroscopic guidance and contrast;  Surgeon: Gilbert Mcclure MD;  Location: [facility]     INJECT JOINT SACROILIAC  4/10/2014    Procedure: INJECT JOINT SACROILIAC;  Sacroiliac Joint Injection;  Surgeon: Gilbert Mcclure MD;  [facility]: [facility]     INJECT JOINT SACROILIAC Left 4/11/2019    Procedure: INJECT JOINT SACROILIAC- LEFT;  Surgeon: Gilbert Mcclure MD;  [facility]     LAPAROSCOPIC CHOLECYSTECTOMY N/A 3/24/2018    Procedure: LAPAROSCOPIC CHOLECYSTECTOMY;  Laparoscopic Cholecystectomy;  Surgeon: Berry Allen DO;  Location: [facility]       Family History:    Family History   Problem Relation Age of Onset     Hypertension Brother      Cerebrovascular Disease Brother      Diabetes Father      Cardiovascular Father      Diabetes Brother         Borderline     Breast Cancer Mother      Diabetes Brother      Blood Disease Brother      Cardiovascular Brother         MI       Social History:  Marital Status:   [2]  Social History     Tobacco Use     Smoking status: Never Smoker     Smokeless tobacco: Never Used   Vaping Use     Vaping Use: Never used   Substance Use Topics     Alcohol use: Yes     Drug use: No        Medications:    oxyCODONE-acetaminophen (PERCOCET) 5-325 MG tablet  pantoprazole (PROTONIX) 40 MG EC tablet  amitriptyline (ELAVIL) 10 MG tablet  blood glucose (ACCU-CHEK COMPACT PLUS) test strip  blood glucose (NO BRAND SPECIFIED) test strip  blood glucose monitoring (NO  BRAND SPECIFIED) meter device kit  blood glucose monitoring (SOFTCLIX) lancets  cholestyramine (QUESTRAN) 4 g packet  diltiazem ER COATED BEADS (CARDIZEM CD/CARTIA XT) 120 MG 24 hr capsule  Incontinence Supply Disposable (PROTECTIVE UNDERWEAR SUPER LG) MISC  levothyroxine (SYNTHROID/LEVOTHROID) 75 MCG tablet  lisinopril (ZESTRIL) 40 MG tablet  Multiple Vitamins-Minerals (MULTIVITAL PO)  order for DME  oxybutynin ER (DITROPAN XL) 10 MG 24 hr tablet  polyethylene glycol (MIRALAX) 17 g packet  potassium chloride ER (K-TAB/KLOR-CON) 10 MEQ CR tablet  pravastatin (PRAVACHOL) 40 MG tablet          Review of Systems all other systems are reviewed and are negative.    Physical Exam   BP: (!) 148/86  Pulse: 63  Temp: 97.8  F (36.6  C)  Resp: 18  Weight: 94.3 kg (208 lb)  SpO2: 96 %      Physical Exam General alert cooperative female in moderate distress.  Examination of her right leg does show that the right knee seems swollen with a joint effusion.  No redness suggesting gout or infection.  She does have almost full range of motion but pain at the extremes of flexion.  No radicular leg pain with with straight leg raising.  Does not have pitting edema in the leg.  No hip or ankle pain or joint effusion.  Foot is unaffected.  Intact pulses.  Negative Homans.  Most of her tenderness is the posterior knee especially on the lateral aspect.    ED Course                 Procedures              Critical Care time:  none               Results for orders placed or performed during the hospital encounter of 08/31/22 (from the past 24 hour(s))   US Lower Extremity Venous Duplex Right    Narrative    EXAM: US LOWER EXTREMITY VENOUS DUPLEX RIGHT  LOCATION: [facility]  DATE/TIME: 8/31/2022 6:14 PM    INDICATION: Atraumatic right leg pain and swelling.  COMPARISON: 12/03/2020.  TECHNIQUE: Venous Duplex ultrasound of the right lower extremity with and without compression, augmentation and duplex. Color flow  "and spectral Doppler with waveform analysis performed.    FINDINGS: Exam includes the common femoral, femoral, popliteal, and contralateral common femoral veins as well as segmentally visualized deep calf veins and greater saphenous vein.     RIGHT: No deep vein thrombosis.      Impression    IMPRESSION:    No deep venous thrombosis in the right lower extremity.   XR Knee Right 3 Views    Narrative    EXAM: XR KNEE RIGHT 3 VIEWS  LOCATION: [facility]  DATE/TIME: 8/31/2022 6:34 PM    INDICATION: Atraumatic knee pain with history of osteoarthritis  COMPARISON: None.      Impression    IMPRESSION: Diffuse osteopenia, which limits evaluation for subtle fractures. Within this limitation, no acute fracture is identified. Mild tricompartmental osteoarthrosis. Moderate knee joint effusion. Quadriceps enthesophyte.       Medications   oxyCODONE-acetaminophen (PERCOCET) 5-325 MG per tablet 1 tablet (1 tablet Oral Given 8/31/22 1753)       Assessments & Plan (with Medical Decision Making)   Mya Hui is a 82 year old female who presents with severe right knee pain that began \"a while ago but got markedly worse last evening.  No recent injury.  No history of DVT.  She does not recall ever hearing about a Baker's cyst.  She has had previous bilateral lower extremity edema but that is currently not an issue.  She denies any chest pain shortness of breath or cough.  No fever or chills.  No history of gout.  She admits to osteoarthritis in many joints.  She has had previous steroid injections in her shoulder and in her lumbar back.  She has not taken anything for pain prior to arrival today.  On exam patient did not have any radicular symptoms.  She did have swelling of the knee consistent with a effusion.  No redness or warmth to suggest infection or gout.  Full range of motion with pain at the extremes of flexion.  Ultrasound did not show any DVT or mention of Baker's cyst.  X-ray did not " show acute fracture but did show some degenerative changes.  Concerns for meniscal injury with the joint effusion.  Knee immobilizer for comfort.  Percocet for pain.  Follow-up with orthopedics for reexamination, possible injection or further evaluation with MRI.  I have reviewed the nursing notes.    I have reviewed the findings, diagnosis, plan and need for follow up with the patient.       New Prescriptions    OXYCODONE-ACETAMINOPHEN (PERCOCET) 5-325 MG TABLET    Take 1 tablet by mouth every 6 hours as needed for severe pain       Final diagnoses:   Acute pain of right knee       8/31/2022   [facility] EMERGENCY DEPT     Ger Hansen MD  08/31/22 3491       Ger Hansen MD  08/31/22 0989

## 2022-08-31 NOTE — ED TRIAGE NOTES
" R knee pain - swelling worsening since last night. Pain has been ongoing for ' a while' but last night into today is \"15/10\".     Triage Assessment     Row Name 08/31/22 1283       Triage Assessment (Adult)    Airway WDL WDL       Respiratory WDL    Respiratory WDL WDL       Cardiac WDL    Cardiac WDL WDL              "

## 2022-08-31 NOTE — DISCHARGE INSTRUCTIONS
Your ultrasound did not show any blood clot or Baker's cyst.  The x-ray does show some degenerative changes but no acute fracture.  You do have a fluid collection in the joint which suggest you may have injured the meniscus or the cushion that is in your knee.  To further assess this you may need an MRI.  Would like to have you see the orthopedic clinic to see if that would be recommended or if a injection of steroids would be helpful.  In the meantime you can apply ice or heat if helpful.  Knee immobilizer as provided.  Percocet for severe pain.

## 2022-09-01 NOTE — ED PROVIDER NOTES
Transfer of Care Note  This patient was signed out to me and I assumed care from Dr. Bay at change of shift.  Mya Hui is a 82 year old female who presented to the emergency department with a chief complaint of Knee Pain  .  Please see the original providers history and physical for complete details.    The following issues were signed out to me to follow up on:  To see if the patient's nausea gets better after IV Zofran and some time.      Pertant Lab/Imaging Findings During this Visit was     Results for orders placed or performed during the hospital encounter of 08/31/22 (from the past 24 hour(s))   US Lower Extremity Venous Duplex Right    Narrative    EXAM: US LOWER EXTREMITY VENOUS DUPLEX RIGHT  LOCATION: [facility]  DATE/TIME: 8/31/2022 6:14 PM    INDICATION: Atraumatic right leg pain and swelling.  COMPARISON: 12/03/2020.  TECHNIQUE: Venous Duplex ultrasound of the right lower extremity with and without compression, augmentation and duplex. Color flow and spectral Doppler with waveform analysis performed.    FINDINGS: Exam includes the common femoral, femoral, popliteal, and contralateral common femoral veins as well as segmentally visualized deep calf veins and greater saphenous vein.     RIGHT: No deep vein thrombosis.      Impression    IMPRESSION:    No deep venous thrombosis in the right lower extremity.   XR Knee Right 3 Views    Narrative    EXAM: XR KNEE RIGHT 3 VIEWS  LOCATION: [facility]  DATE/TIME: 8/31/2022 6:34 PM    INDICATION: Atraumatic knee pain with history of osteoarthritis  COMPARISON: None.      Impression    IMPRESSION: Diffuse osteopenia, which limits evaluation for subtle fractures. Within this limitation, no acute fracture is identified. Mild tricompartmental osteoarthrosis. Moderate knee joint effusion. Quadriceps enthesophyte.         My focused follow up physical exam shows:   Vitals:  B/P: 169/90, T: 97.8,  P: 92, R: 18  Gen:  Pt appears stable and no apparent distress      ED Course & Medical Decision Making:  Patient's nausea did not get any better he did an additional dose of Zofran and this still did not help.  Patient was very concerned about going home with home nausea she was.  I convince her to try some IV Compazine and now patient is starting to feel a lot better and does feel comfortable going home.  I have concerned with her using more oxycodone that this will make her more nauseous but it seems like the Compazine did help so I will send her home with Compazine to see if this helps if she has to use it but for the most part the patient will try and stick with just ibuprofen.  Patient will be discharged at this time.         Impression and Disposition:  Knee pain, medication side effect           This note was completed in part using Dragon voice recognition, and may contain word and grammatical errors.        Nelson Monzon MD  08/31/22 [id number]

## 2022-09-06 RX ORDER — OXYBUTYNIN CHLORIDE 10 MG/1
TABLET, EXTENDED RELEASE ORAL
Qty: 90 TABLET | Refills: 0 | Status: SHIPPED | OUTPATIENT
Start: 2022-09-06 | End: 2022-12-04

## 2022-09-09 ENCOUNTER — OFFICE VISIT (OUTPATIENT)
Dept: FAMILY MEDICINE | Facility: OTHER | Age: 82
End: 2022-09-09
Payer: COMMERCIAL

## 2022-09-09 VITALS
RESPIRATION RATE: 28 BRPM | DIASTOLIC BLOOD PRESSURE: 74 MMHG | HEART RATE: 70 BPM | OXYGEN SATURATION: 95 % | SYSTOLIC BLOOD PRESSURE: 114 MMHG | BODY MASS INDEX: 34.41 KG/M2 | WEIGHT: 205 LBS | TEMPERATURE: 97.8 F

## 2022-09-09 DIAGNOSIS — G89.29 CHRONIC PAIN OF RIGHT KNEE: ICD-10-CM

## 2022-09-09 DIAGNOSIS — E11.51 DIABETES MELLITUS WITH PERIPHERAL VASCULAR DISEASE (H): ICD-10-CM

## 2022-09-09 DIAGNOSIS — M25.561 CHRONIC PAIN OF RIGHT KNEE: ICD-10-CM

## 2022-09-09 DIAGNOSIS — I47.10 SVT (SUPRAVENTRICULAR TACHYCARDIA) (H): Primary | ICD-10-CM

## 2022-09-09 DIAGNOSIS — M79.661 PAIN OF RIGHT LOWER LEG: ICD-10-CM

## 2022-09-09 DIAGNOSIS — E66.01 MORBID OBESITY (H): ICD-10-CM

## 2022-09-09 PROCEDURE — 99214 OFFICE O/P EST MOD 30 MIN: CPT | Mod: 25 | Performed by: PHYSICIAN ASSISTANT

## 2022-09-09 PROCEDURE — 90662 IIV NO PRSV INCREASED AG IM: CPT | Performed by: PHYSICIAN ASSISTANT

## 2022-09-09 PROCEDURE — G0008 ADMIN INFLUENZA VIRUS VAC: HCPCS | Performed by: PHYSICIAN ASSISTANT

## 2022-09-09 RX ORDER — TRAMADOL HYDROCHLORIDE 50 MG/1
25-50 TABLET ORAL EVERY 6 HOURS PRN
Qty: 10 TABLET | Refills: 0 | Status: SHIPPED | OUTPATIENT
Start: 2022-09-09 | End: 2022-09-12

## 2022-09-09 ASSESSMENT — PAIN SCALES - GENERAL: PAINLEVEL: MILD PAIN (3)

## 2022-09-09 NOTE — PROGRESS NOTES
Assessment & Plan     SVT (supraventricular tachycardia) (H)  Patient's recent leadless EKG noted runs of SVT. She denies symptoms at this time. Has scheduled consult with cardiologist to discuss whether additional workup and/or medication adjustment is necessary. Educational information on SVT provided to patient.     Chronic pain of right knee  Patient was not tolerating the oxycodone provided to her at her ED visit. She has been managing the pain with OTC analgesics such as tylenol. I will send out some tramadol if needed. Reviewed possible adverse effects from this medication.   - traMADol (ULTRAM) 50 MG tablet; Take 0.5-1 tablets (25-50 mg) by mouth every 6 hours as needed for severe pain (Do not use with other opiates)    Pain of right lower leg  See above.   - traMADol (ULTRAM) 50 MG tablet; Take 0.5-1 tablets (25-50 mg) by mouth every 6 hours as needed for severe pain (Do not use with other opiates)    Diabetes mellitus with peripheral vascular disease (H)  Patient reports that her average blood sugar level has been 130's. She was concerned about some higher readings in the 140's. I will have her continue to monitor this at home. If the average begins to increase we can discuss beginning treatment.    Morbid obesity (H)  Patient will continue to work on healthy lifestyle habits as she is able.    SAILAJA Del Toro [facility] LALITA Roque is a 82 year old, presenting for the following health issues:  Follow Up       History of Present Illness       Heart Failure:  She presents for follow up of heart failure. She is not experiencing shortness of breath at night, with rest or with activity She is experiencing lower extremity edema which is worse than usual. She denies orthopenea and is not coughing at night. Patient is not checking weight daily. She has recently had a None. She has side effects from medications including other. She has had no other medical visits for  heart failure since the last visit.    She eats 0-1 servings of fruits and vegetables daily.She consumes 0 sweetened beverage(s) daily.She exercises with enough effort to increase her heart rate 9 or less minutes per day.  She exercises with enough effort to increase her heart rate 3 or less days per week. She is missing 1 dose(s) of medications per week.       ED/UC Followup:    Facility:  [facility] and also [facility] urgent care [place] in the same day  Date of visit: 8/31/22  Reason for visit: right leg and knee pain  Current Status: not better    Patient is an 82 year old female who presents with her daughter for follow up on recent urgent care visit. She informs me that she was seen at the [facility] ED on 08/31/22 at the recommendation from the [facility] urgent care. There was concern for DVT. Workup at the ED was negative for DVT and imaging showed only mild OA. Patient informs me that medial right knee is quite painful which radiates distally to the mid right lower leg. She cannot recall any specific injury or activity which triggered the sudden worsening of the pain.     Review of Systems   Constitutional, HEENT, cardiovascular, pulmonary, gi and gu systems are negative, except as otherwise noted.      Objective    /74   Pulse 70   Temp 97.8  F (36.6  C) (Temporal)   Resp 28   Wt 93 kg (205 lb)   LMP  (LMP Unknown)   SpO2 95%   BMI 34.41 kg/m    Body mass index is 34.41 kg/m .  Physical Exam   GENERAL: healthy, alert and no distress  RESP: lungs clear to auscultation - no rales, rhonchi or wheezes  CV: regular rate and rhythm, normal S1 S2, no S3 or S4, no murmur, click or rub, no peripheral edema and peripheral pulses strong  MS: Normal AROM of right hip/knee/ankle. Pain limits full flexion of right knee. Tenderness to palpation along the right medial/lateral joint lines  PSYCH: mentation appears normal, affect normal/bright    US Lower Extremity Venous Duplex Right      Narrative     EXAM: US LOWER EXTREMITY VENOUS DUPLEX RIGHT  LOCATION: [facility]  DATE/TIME: 8/31/2022 6:14 PM     INDICATION: Atraumatic right leg pain and swelling.  COMPARISON: 12/03/2020.  TECHNIQUE: Venous Duplex ultrasound of the right lower extremity with and without compression, augmentation and duplex. Color flow and spectral Doppler with waveform analysis performed.     FINDINGS: Exam includes the common femoral, femoral, popliteal, and contralateral common femoral veins as well as segmentally visualized deep calf veins and greater saphenous vein.      RIGHT: No deep vein thrombosis.        Impression     IMPRESSION:     No deep venous thrombosis in the right lower extremity.   XR Knee Right 3 Views     Narrative     EXAM: XR KNEE RIGHT 3 VIEWS  LOCATION: [facility]  DATE/TIME: 8/31/2022 6:34 PM     INDICATION: Atraumatic knee pain with history of osteoarthritis  COMPARISON: None.        Impression     IMPRESSION: Diffuse osteopenia, which limits evaluation for subtle fractures. Within this limitation, no acute fracture is identified. Mild tricompartmental osteoarthrosis. Moderate knee joint effusion. Quadriceps enthesophyte.

## 2022-09-15 ENCOUNTER — OFFICE VISIT (OUTPATIENT)
Dept: ORTHOPEDICS | Facility: OTHER | Age: 82
End: 2022-09-15
Attending: EMERGENCY MEDICINE
Payer: COMMERCIAL

## 2022-09-15 VITALS
BODY MASS INDEX: 34.16 KG/M2 | HEIGHT: 65 IN | DIASTOLIC BLOOD PRESSURE: 69 MMHG | RESPIRATION RATE: 18 BRPM | WEIGHT: 205 LBS | HEART RATE: 77 BPM | SYSTOLIC BLOOD PRESSURE: 117 MMHG

## 2022-09-15 DIAGNOSIS — M54.31 SCIATICA OF RIGHT SIDE: Primary | ICD-10-CM

## 2022-09-15 DIAGNOSIS — M25.561 ACUTE PAIN OF RIGHT KNEE: ICD-10-CM

## 2022-09-15 PROCEDURE — 99214 OFFICE O/P EST MOD 30 MIN: CPT | Performed by: ORTHOPAEDIC SURGERY

## 2022-09-15 NOTE — LETTER
9/15/2022         RE: Mya Hui  [address]  [place] [id number]        Dear Colleague,    Thank you for referring your patient, Mya Hui, to the [facility] [place]. Please see a copy of my visit note below.    Mya Hui is a 82 year old female who is seen as self referral for right knee pain.  She has had pain for the last 3 months in the thigh and knee.  She has dull, aching, constant pain rated 4 out of 10.  She feels the pain gets worse with too much walking.  She has had some back issues as well and received epidural injection recently.  Apparently the right leg pain got worse during and after the epidural.  She is most concerned about her knee, as it hurts to bend the knee.    X-ray of the knee shows no significant arthritic changes.  She is had ultrasound in the emergency room to rule out DVT which was negative.    Past Medical History:   Diagnosis Date     Acute cholecystitis 3/23/2018     Acute kidney injury (H) 9/10/2017     Arthritis      Dehydration 9/4/2017     Diabetes type 2, controlled (H)      Elevated lactic acid level 9/10/2017     GERD (gastroesophageal reflux disease)      History of renal calculi      HTN, goal below 140/90      Hyperlipidaemia LDL goal < 100      Hypokalemia 9/3/2017     Hypothyroid      Insomnia      Left carotid stenosis      Migraine      Motion sickness      PONV (postoperative nausea and vomiting)      Sciatica of right side 6/28/2013     Type 2 diabetes mellitus without complication, without long-term current use of insulin (H) 2/16/2017       Past Surgical History:   Procedure Laterality Date     BUNIONECTOMY      Right foot     CATARACT IOL, RT/LT Bilateral 2017     COLONOSCOPY  7/12/2013    Procedure: COLONOSCOPY;  Colonoscopy;  Surgeon: Giovany Espinoza MD;  Location:  GI     COLONOSCOPY N/A 1/31/2019    Procedure: Combined Colonoscopy, Single Or Multiple Biopsy/Polypectomy By Biopsy;   Surgeon: Efren Osorio MD;  Location: [facility] OR     COLONOSCOPY WITH CO2 INSUFFLATION N/A 1/31/2019    Procedure: COLONOSCOPY WITH CO2 INSUFFLATION;  Surgeon: Efren Osorio MD;  Location: [facility] OR     FRACTURE TX, ANKLE RT/LT      Left ankle     HC CYSTOURETHROSCOPY W/ URETEROSCOPY &/OR PYELOSCOPY; W/ LITHOTRIPSY       HC REVISE MEDIAN N/CARPAL TUNNEL SURG      Right wrist     INJECT EPIDURAL LUMBAR Right 12/27/2018    Procedure: INJECT EPIDURAL LUMBAR right foraminal narrowing severe at L4-L5 and moderate at L5-S1;  Surgeon: Gilbert Mcclure MD;  Location: [facility] OR     INJECT EPIDURAL TRANSFORAMINAL Bilateral 5/27/2021    Procedure: Bilateral Lumbar 5-sacral 1 Epidural steroid injection;  Surgeon: Gilbert Mcclure MD;  Location: [facility] OR     INJECT EPIDURAL TRANSFORAMINAL Right 6/17/2022    Procedure: Attempted right Lumbar 4-5 and Lumbar 5-Sacral 1 epidural injections with completion of only the right Lumbar 5-Sacral1 Transforaminal Epidural Steroid Injection with fluoroscopic guidance and contrast;  Surgeon: Gilbert Mcclure MD;  Location: [facility] OR     INJECT JOINT SACROILIAC  4/10/2014    Procedure: INJECT JOINT SACROILIAC;  Sacroiliac Joint Injection;  Surgeon: Gilbert Mcclure MD;  Location: [facility] OR     INJECT JOINT SACROILIAC Left 4/11/2019    Procedure: INJECT JOINT SACROILIAC- LEFT;  Surgeon: Gilbert Mcclure MD;  Location: [facility] OR     LAPAROSCOPIC CHOLECYSTECTOMY N/A 3/24/2018    Procedure: LAPAROSCOPIC CHOLECYSTECTOMY;  Laparoscopic Cholecystectomy;  Surgeon: Berry Allen DO;  Location: [facility] OR       Family History   Problem Relation Age of Onset     Hypertension Brother      Cerebrovascular Disease Brother      Diabetes Father      Cardiovascular Father      Diabetes Brother         Borderline     Breast Cancer Mother      Diabetes Brother      Blood Disease Brother      Cardiovascular Brother         MI       Social History     Socioeconomic History     Marital status:      Spouse  name: Not on file     Number of children: Not on file     Years of education: Not on file     Highest education level: Not on file   Occupational History     Employer: RETIRED     Comment: 's office part time   Tobacco Use     Smoking status: Never Smoker     Smokeless tobacco: Never Used   Vaping Use     Vaping Use: Never used   Substance and Sexual Activity     Alcohol use: Yes     Drug use: No     Sexual activity: Not Currently   Other Topics Concern     Parent/sibling w/ CABG, MI or angioplasty before 65F 55M? Not Asked   Social History Narrative     Not on file     Social Determinants of Health     Financial Resource Strain: Not on file   Food Insecurity: Not on file   Transportation Needs: Not on file   Physical Activity: Not on file   Stress: Not on file   Social Connections: Not on file   Intimate Partner Violence: Not on file   Housing Stability: Not on file       Current Outpatient Medications   Medication Sig Dispense Refill     blood glucose (ACCU-CHEK COMPACT PLUS) test strip 1 strip by In Vitro route daily Use to test blood sugar 1 times daily or as directed. 100 strip 0     blood glucose (NO BRAND SPECIFIED) test strip Use to test blood sugar 1 times daily or as directed. 100 strip 0     blood glucose monitoring (NO BRAND SPECIFIED) meter device kit Use to test blood sugar 1 times daily or as directed. Blood Glucose Monitor Brands: ACCU-CHEK COMPACT PLUS 1 kit 0     blood glucose monitoring (SOFTCLIX) lancets 1 each by In Vitro route daily Use to test blood sugar 1 times daily or as directed. 100 each 11     cholestyramine (QUESTRAN) 4 g packet DISSOLVE AND TAKE ONE PACKET BY MOUTH TWICE A DAY WITH MEALS (Patient taking differently: Take 1 packet by mouth 2 times daily (with meals) Lunch and supper) 60 packet 11     diltiazem ER COATED BEADS (CARDIZEM CD/CARTIA XT) 120 MG 24 hr capsule TAKE ONE CAPSULE BY MOUTH TWICE A DAY (Patient taking differently: Take 120 mg by mouth 2 times daily) 180  capsule 3     Incontinence Supply Disposable (PROTECTIVE UNDERWEAR SUPER LG) MISC 3 each daily 90 each 11     levothyroxine (SYNTHROID/LEVOTHROID) 75 MCG tablet TAKE ONE TABLET BY MOUTH ONCE DAILY (Patient taking differently: Take 75 mcg by mouth daily) 90 tablet 3     lisinopril (ZESTRIL) 40 MG tablet TAKE ONE TABLET BY MOUTH ONCE DAILY (Patient taking differently: Take 40 mg by mouth daily (with dinner)) 90 tablet 2     Multiple Vitamins-Minerals (MULTIVITAL PO) Take by mouth daily (with dinner) daily       order for DME Equipment being ordered: [employer] shoes and insoles 2 Units 1     oxybutynin ER (DITROPAN XL) 10 MG 24 hr tablet TAKE ONE TABLET BY MOUTH ONCE DAILY 90 tablet 0     pantoprazole (PROTONIX) 40 MG EC tablet TAKE ONE TABLET BY MOUTH TWICE A DAY (Patient taking differently: Before Lunch and supper) 180 tablet 0     polyethylene glycol (MIRALAX) 17 g packet Take 1 packet by mouth every evening        potassium chloride ER (K-TAB/KLOR-CON) 10 MEQ CR tablet TAKE ONE TABLET BY MOUTH THREE TIMES A DAY (Patient taking differently: Take 10 mEq by mouth 3 times daily) 180 tablet 4     pravastatin (PRAVACHOL) 40 MG tablet TAKE ONE TABLET BY MOUTH ONCE DAILY (Patient taking differently: Take 40 mg by mouth daily) 90 tablet 3     prochlorperazine (COMPAZINE) 5 MG tablet Take 1 tablet (5 mg) by mouth every 6 hours as needed for nausea or vomiting (Patient not taking: Reported on 9/9/2022) 15 tablet 0       Allergies   Allergen Reactions     Codeine Nausea     Fentanyl Nausea and Vomiting     VERY sensitive to most narcotics if not all.       REVIEW OF SYSTEMS:  CONSTITUTIONAL:  NEGATIVE for fever, chills, change in weight, not feeling tired  SKIN:  NEGATIVE for worrisome rashes, no skin lumps, no skin ulcers and no non-healing wounds  EYES:  NEGATIVE for vision changes or irritation.  ENT/MOUTH:  NEGATIVE.  No hearing loss, no hoarseness, no difficulty swallowing.  RESP:  NEGATIVE. No cough or shortness of  "breath.  CV:  NEGATIVE for chest pain, palpitations or peripheral edema  GI:  NEGATIVE for nausea, abdominal pain, heartburn, or change in bowel habits  :  Negative. No dysuria, no hematuria  MUSCULOSKELETAL:  See HPI above  NEURO:  NEGATIVE . No headaches, no dizziness,  no numbness  ENDOCRINE:  NEGATIVE for temperature intolerance, skin/hair changes  HEME/ALLERGY/IMMUNE:  NEGATIVE for bleeding problems  PSYCHIATRIC:  NEGATIVE. no anxiety, no depression.     Exam:  Vitals: /69   Pulse 77   Resp 18   Ht 1.645 m (5' 4.75\")   Wt 93 kg (205 lb)   LMP  (LMP Unknown)   BMI 34.38 kg/m    BMI= Body mass index is 34.38 kg/m .  Constitutional:  healthy, alert and no distress  Neuro: Alert and Oriented x 3, no focal defects.  Psych: Affect normal   Respiratory: Breathing not labored.  Cardiovascular: normal peripheral pulses  Lymph: no adenopathy  Skin: No rashes,worrisome lesions or skin problems  She has pain in the thigh with straight leg raising on the right to 80 degrees, negative on the left.  She has decreased motion of the right knee with allowing flexion to only about 80 degrees.  Left she allows flexion to at least 120 degrees.  Tr's were negative on the left knee.  I was not able to bend far enough to do Tr's on the right.  From 80 degrees out to extension she did not have pain with Tr test.  She has a small effusion on the right knee but not enough to stop motion.  There is some diffuse tenderness of the right knee at the mid medial joint, posterior medial joint, posterior joint.  Sensation, motor and circulation are intact.    Assessment:  Right knee pain without osteoarthritis.  Cannot rule out medial meniscus tear.  2. Lumbar spinal stenosis with right sciatica.  Steroid injection seems to have made this worse.    Plan: We discussed steroid injection, anti-inflammatories, repeat exam once she bends the knee further.  We would need to be able obtain better flexion to do Tr " accurately to see if MRI scan is indicated.  For now she would like to increase ibuprofen.  She has declined steroid injection.        Again, thank you for allowing me to participate in the care of your patient.        Sincerely,        Naman Choe MD

## 2022-09-15 NOTE — PROGRESS NOTES
Mya Hui is a 82 year old female who is seen as self referral for right knee pain.  She has had pain for the last 3 months in the thigh and knee.  She has dull, aching, constant pain rated 4 out of 10.  She feels the pain gets worse with too much walking.  She has had some back issues as well and received epidural injection recently.  Apparently the right leg pain got worse during and after the epidural.  She is most concerned about her knee, as it hurts to bend the knee.    X-ray of the knee shows no significant arthritic changes.  She is had ultrasound in the emergency room to rule out DVT which was negative.    Past Medical History:   Diagnosis Date     Acute cholecystitis 3/23/2018     Acute kidney injury (H) 9/10/2017     Arthritis      Dehydration 9/4/2017     Diabetes type 2, controlled (H)      Elevated lactic acid level 9/10/2017     GERD (gastroesophageal reflux disease)      History of renal calculi      HTN, goal below 140/90      Hyperlipidaemia LDL goal < 100      Hypokalemia 9/3/2017     Hypothyroid      Insomnia      Left carotid stenosis      Migraine      Motion sickness      PONV (postoperative nausea and vomiting)      Sciatica of right side 6/28/2013     Type 2 diabetes mellitus without complication, without long-term current use of insulin (H) 2/16/2017       Past Surgical History:   Procedure Laterality Date     BUNIONECTOMY      Right foot     CATARACT IOL, RT/LT Bilateral 2017     COLONOSCOPY  7/12/2013    Procedure: COLONOSCOPY;  Colonoscopy;  Surgeon: Giovany Espinoza MD;  [facility]     COLONOSCOPY N/A 1/31/2019    Procedure: Combined Colonoscopy, Single Or Multiple Biopsy/Polypectomy By Biopsy;  Surgeon: Efren Osorio MD;  [facility]     COLONOSCOPY WITH CO2 INSUFFLATION N/A 1/31/2019    Procedure: COLONOSCOPY WITH CO2 INSUFFLATION;  Surgeon: Efren Osorio MD;  [facility]     FRACTURE TX, ANKLE RT/LT      Left ankle     HC  CYSTOURETHROSCOPY W/ URETEROSCOPY &/OR PYELOSCOPY; W/ LITHOTRIPSY       HC REVISE MEDIAN N/CARPAL TUNNEL SURG      Right wrist     INJECT EPIDURAL LUMBAR Right 12/27/2018    Procedure: INJECT EPIDURAL LUMBAR right foraminal narrowing severe at L4-L5 and moderate at L5-S1;  Surgeon: Gilbert Mcclure MD;  [facility]     INJECT EPIDURAL TRANSFORAMINAL Bilateral 5/27/2021    Procedure: Bilateral Lumbar 5-sacral 1 Epidural steroid injection;  Surgeon: Gilbert Mcclure MD;  [facility]     INJECT EPIDURAL TRANSFORAMINAL Right 6/17/2022    Procedure: Attempted right Lumbar 4-5 and Lumbar 5-Sacral 1 epidural injections with completion of only the right Lumbar 5-Sacral1 Transforaminal Epidural Steroid Injection with fluoroscopic guidance and contrast;  Surgeon: Gilbert Mcclure MD;  [facility]     INJECT JOINT SACROILIAC  4/10/2014    Procedure: INJECT JOINT SACROILIAC;  Sacroiliac Joint Injection;  Surgeon: Gilbert Mcclure MD;  [facility]     INJECT JOINT SACROILIAC Left 4/11/2019    Procedure: INJECT JOINT SACROILIAC- LEFT;  Surgeon: Gilbert Mcclure MD;  [facility]     LAPAROSCOPIC CHOLECYSTECTOMY N/A 3/24/2018    Procedure: LAPAROSCOPIC CHOLECYSTECTOMY;  Laparoscopic Cholecystectomy;  Surgeon: Berry Allen DO;  [facility]       Family History   Problem Relation Age of Onset     Hypertension Brother      Cerebrovascular Disease Brother      Diabetes Father      Cardiovascular Father      Diabetes Brother         Borderline     Breast Cancer Mother      Diabetes Brother      Blood Disease Brother      Cardiovascular Brother         MI       Social History     Socioeconomic History     Marital status:      Spouse name: Not on file     Number of children: Not on file     Years of education: Not on file     Highest education level: Not on file   Occupational History     Employer: RETIRED     Comment: 's office part time   Tobacco Use     Smoking status: Never Smoker      Smokeless tobacco: Never Used   Vaping Use     Vaping Use: Never used   Substance and Sexual Activity     Alcohol use: Yes     Drug use: No     Sexual activity: Not Currently   Other Topics Concern     Parent/sibling w/ CABG, MI or angioplasty before 65F 55M? Not Asked   Social History Narrative     Not on file     Social Determinants of Health     Financial Resource Strain: Not on file   Food Insecurity: Not on file   Transportation Needs: Not on file   Physical Activity: Not on file   Stress: Not on file   Social Connections: Not on file   Intimate Partner Violence: Not on file   Housing Stability: Not on file       Current Outpatient Medications   Medication Sig Dispense Refill     blood glucose (ACCU-CHEK COMPACT PLUS) test strip 1 strip by In Vitro route daily Use to test blood sugar 1 times daily or as directed. 100 strip 0     blood glucose (NO BRAND SPECIFIED) test strip Use to test blood sugar 1 times daily or as directed. 100 strip 0     blood glucose monitoring (NO BRAND SPECIFIED) meter device kit Use to test blood sugar 1 times daily or as directed. Blood Glucose Monitor Brands: ACCU-CHEK COMPACT PLUS 1 kit 0     blood glucose monitoring (SOFTCLIX) lancets 1 each by In Vitro route daily Use to test blood sugar 1 times daily or as directed. 100 each 11     cholestyramine (QUESTRAN) 4 g packet DISSOLVE AND TAKE ONE PACKET BY MOUTH TWICE A DAY WITH MEALS (Patient taking differently: Take 1 packet by mouth 2 times daily (with meals) Lunch and supper) 60 packet 11     diltiazem ER COATED BEADS (CARDIZEM CD/CARTIA XT) 120 MG 24 hr capsule TAKE ONE CAPSULE BY MOUTH TWICE A DAY (Patient taking differently: Take 120 mg by mouth 2 times daily) 180 capsule 3     Incontinence Supply Disposable (PROTECTIVE UNDERWEAR SUPER LG) MISC 3 each daily 90 each 11     levothyroxine (SYNTHROID/LEVOTHROID) 75 MCG tablet TAKE ONE TABLET BY MOUTH ONCE DAILY (Patient taking differently: Take 75 mcg by mouth daily) 90 tablet 3      lisinopril (ZESTRIL) 40 MG tablet TAKE ONE TABLET BY MOUTH ONCE DAILY (Patient taking differently: Take 40 mg by mouth daily (with dinner)) 90 tablet 2     Multiple Vitamins-Minerals (MULTIVITAL PO) Take by mouth daily (with dinner) daily       order for DME Equipment being ordered: Nike shoes and insoles 2 Units 1     oxybutynin ER (DITROPAN XL) 10 MG 24 hr tablet TAKE ONE TABLET BY MOUTH ONCE DAILY 90 tablet 0     pantoprazole (PROTONIX) 40 MG EC tablet TAKE ONE TABLET BY MOUTH TWICE A DAY (Patient taking differently: Before Lunch and supper) 180 tablet 0     polyethylene glycol (MIRALAX) 17 g packet Take 1 packet by mouth every evening        potassium chloride ER (K-TAB/KLOR-CON) 10 MEQ CR tablet TAKE ONE TABLET BY MOUTH THREE TIMES A DAY (Patient taking differently: Take 10 mEq by mouth 3 times daily) 180 tablet 4     pravastatin (PRAVACHOL) 40 MG tablet TAKE ONE TABLET BY MOUTH ONCE DAILY (Patient taking differently: Take 40 mg by mouth daily) 90 tablet 3     prochlorperazine (COMPAZINE) 5 MG tablet Take 1 tablet (5 mg) by mouth every 6 hours as needed for nausea or vomiting (Patient not taking: Reported on 9/9/2022) 15 tablet 0       Allergies   Allergen Reactions     Codeine Nausea     Fentanyl Nausea and Vomiting     VERY sensitive to most narcotics if not all.       REVIEW OF SYSTEMS:  CONSTITUTIONAL:  NEGATIVE for fever, chills, change in weight, not feeling tired  SKIN:  NEGATIVE for worrisome rashes, no skin lumps, no skin ulcers and no non-healing wounds  EYES:  NEGATIVE for vision changes or irritation.  ENT/MOUTH:  NEGATIVE.  No hearing loss, no hoarseness, no difficulty swallowing.  RESP:  NEGATIVE. No cough or shortness of breath.  CV:  NEGATIVE for chest pain, palpitations or peripheral edema  GI:  NEGATIVE for nausea, abdominal pain, heartburn, or change in bowel habits  :  Negative. No dysuria, no hematuria  MUSCULOSKELETAL:  See HPI above  NEURO:  NEGATIVE . No headaches, no dizziness,  no  "numbness  ENDOCRINE:  NEGATIVE for temperature intolerance, skin/hair changes  HEME/ALLERGY/IMMUNE:  NEGATIVE for bleeding problems  PSYCHIATRIC:  NEGATIVE. no anxiety, no depression.     Exam:  Vitals: /69   Pulse 77   Resp 18   Ht 1.645 m (5' 4.75\")   Wt 93 kg (205 lb)   LMP  (LMP Unknown)   BMI 34.38 kg/m    BMI= Body mass index is 34.38 kg/m .  Constitutional:  healthy, alert and no distress  Neuro: Alert and Oriented x 3, no focal defects.  Psych: Affect normal   Respiratory: Breathing not labored.  Cardiovascular: normal peripheral pulses  Lymph: no adenopathy  Skin: No rashes,worrisome lesions or skin problems  She has pain in the thigh with straight leg raising on the right to 80 degrees, negative on the left.  She has decreased motion of the right knee with allowing flexion to only about 80 degrees.  Left she allows flexion to at least 120 degrees.  Tr's were negative on the left knee.  I was not able to bend far enough to do Tr's on the right.  From 80 degrees out to extension she did not have pain with Tr test.  She has a small effusion on the right knee but not enough to stop motion.  There is some diffuse tenderness of the right knee at the mid medial joint, posterior medial joint, posterior joint.  Sensation, motor and circulation are intact.    Assessment:  Right knee pain without osteoarthritis.  Cannot rule out medial meniscus tear.  2. Lumbar spinal stenosis with right sciatica.  Steroid injection seems to have made this worse.    Plan: We discussed steroid injection, anti-inflammatories, repeat exam once she bends the knee further.  We would need to be able obtain better flexion to do Tr accurately to see if MRI scan is indicated.  For now she would like to increase ibuprofen.  She has declined steroid injection.    "

## 2022-09-21 ENCOUNTER — OFFICE VISIT (OUTPATIENT)
Dept: CARDIOLOGY | Facility: CLINIC | Age: 82
End: 2022-09-21
Attending: PHYSICIAN ASSISTANT
Payer: COMMERCIAL

## 2022-09-21 VITALS
OXYGEN SATURATION: 96 % | DIASTOLIC BLOOD PRESSURE: 68 MMHG | HEIGHT: 65 IN | BODY MASS INDEX: 34.97 KG/M2 | SYSTOLIC BLOOD PRESSURE: 138 MMHG | HEART RATE: 60 BPM | WEIGHT: 209.9 LBS

## 2022-09-21 DIAGNOSIS — I49.9 IRREGULAR HEART RATE: ICD-10-CM

## 2022-09-21 DIAGNOSIS — I49.8 BIGEMINY: ICD-10-CM

## 2022-09-21 PROCEDURE — 99204 OFFICE O/P NEW MOD 45 MIN: CPT | Performed by: INTERNAL MEDICINE

## 2022-09-21 NOTE — LETTER
9/21/2022    Kisha Stinson PA-C  [address] [facility] [address]  [facility] [address]    RE: Mya Hui       Dear Colleague,     I had the pleasure of seeing Mya Hui in the [facility].  HISTORY:    Mya Hui is a pleasant 82-year-old female accompanied by her daughter today.  She has not had any cardiac issues in the past.  She does have a history of hypertension, venous insufficiency, hyperlipidemia, stage III CKD, and obesity.  She was asked to see me for atrial bigeminy.    Mya is completely asymptomatic.  She denies any sense of palpitations, syncope or near syncope, lightheadedness, strokelike symptoms, exertional chest arm neck shoulder or jaw discomfort, PND/orthopnea, peripheral edema, or claudication.    On routine physical exam the patient was noted to have an irregular pulse.  A twelve-lead ECG was done and is reviewed.  It shows bigeminal PACs and is otherwise normal.  In response to this a 3-day Zio patch was done which showed 50 episodes of SVT, all of them brief and self-limiting with the longest being 14 beats.  Her average heart rate was 70 but she was noted to have frequent PACs amounting to a 10% burden.  Cardiology consultation was requested.    Mya remains physically active.  She uses a walker because of some balance issues but feels that her stamina is normal for her age and size.  She expresses no cardiovascular concerns whatsoever.  Her cardiac risk factors are well controlled with a blood pressure that is generally normal and is 138/68 today.  Her total cholesterol is 157 with an LDL of 71.  She has not ever had any documented vascular disease.      ASSESSMENT/PLAN:    1.  Frequent asymptomatic PACs and occasional asymptomatic self-limiting runs of SVT.  These are both benign rhythms that do not require further therapy.  Examination today is essentially normal except for some premature beats.  I will arrange an echocardiogram to make  sure were not missing any structural abnormalities but I think this is highly unlikely.  Since she is completely unaware of her arrhythmia and her arrhythmia is benign there is no compelling reason to initiate therapy.  I did discuss in detail with her the fact that she is at increased risk of developing atrial fibrillation at some point in the future.  There is nothing we can do to materially change that risk and until atrial fibs is documented no change in therapy is warranted.  I did suggest that she might consider getting a Kardia device and using it once a week or so to monitor for onset of atrial fibrillation.  She seemed interested in this concept.    Thank you for inviting me to participate in the care of your patient.  Please don't hesitate to call if I can be of further assistance.  45 minutes were spent today reviewing the chart and other records, interviewing and examining the patient, and documenting our visit.  An echocardiogram will be arranged and we will contact her with results.  Unless there are unexpected abnormalities on that study, no further cardiology follow-up is needed.  Of course I would be happy to see her should further cardiology questions or issues arise.    Chart documentation was completed, in part, with [employer] [place] voice-recognition software. Even though reviewed, some grammatical, spelling, and word errors may remain.       Orders Placed This Encounter   Procedures     Echocardiogram Complete     No orders of the defined types were placed in this encounter.    Medications Discontinued During This Encounter   Medication Reason     prochlorperazine (COMPAZINE) 5 MG tablet        10 year ASCVD risk: The ASCVD Risk score (Dawsonroxane REYNAGA Jr., et al., 2013) failed to calculate for the following reasons:    The 2013 ASCVD risk score is only valid for ages 40 to 79    Encounter Diagnoses   Name Primary?     Irregular heart rate      Bigeminy        CURRENT MEDICATIONS:  Current Outpatient Medications    Medication Sig Dispense Refill     blood glucose (ACCU-CHEK COMPACT PLUS) test strip 1 strip by In Vitro route daily Use to test blood sugar 1 times daily or as directed. 100 strip 0     blood glucose (NO BRAND SPECIFIED) test strip Use to test blood sugar 1 times daily or as directed. 100 strip 0     blood glucose monitoring (NO BRAND SPECIFIED) meter device kit Use to test blood sugar 1 times daily or as directed. Blood Glucose Monitor Brands: ACCU-CHEK COMPACT PLUS 1 kit 0     blood glucose monitoring (SOFTCLIX) lancets 1 each by In Vitro route daily Use to test blood sugar 1 times daily or as directed. 100 each 11     cholestyramine (QUESTRAN) 4 g packet DISSOLVE AND TAKE ONE PACKET BY MOUTH TWICE A DAY WITH MEALS (Patient taking differently: Take 1 packet by mouth 2 times daily (with meals) Lunch and supper) 60 packet 11     diltiazem ER COATED BEADS (CARDIZEM CD/CARTIA XT) 120 MG 24 hr capsule TAKE ONE CAPSULE BY MOUTH TWICE A DAY (Patient taking differently: Take 120 mg by mouth 2 times daily) 180 capsule 3     Incontinence Supply Disposable (PROTECTIVE UNDERWEAR SUPER LG) MISC 3 each daily 90 each 11     levothyroxine (SYNTHROID/LEVOTHROID) 75 MCG tablet TAKE ONE TABLET BY MOUTH ONCE DAILY (Patient taking differently: Take 75 mcg by mouth daily) 90 tablet 3     lisinopril (ZESTRIL) 40 MG tablet TAKE ONE TABLET BY MOUTH ONCE DAILY (Patient taking differently: Take 40 mg by mouth daily (with dinner)) 90 tablet 2     Multiple Vitamins-Minerals (MULTIVITAL PO) Take by mouth daily (with dinner) daily       order for DME Equipment being ordered: Nike shoes and insoles 2 Units 1     oxybutynin ER (DITROPAN XL) 10 MG 24 hr tablet TAKE ONE TABLET BY MOUTH ONCE DAILY 90 tablet 0     pantoprazole (PROTONIX) 40 MG EC tablet TAKE ONE TABLET BY MOUTH TWICE A DAY (Patient taking differently: Before Lunch and supper) 180 tablet 0     polyethylene glycol (MIRALAX) 17 g packet Take 1 packet by mouth every evening         potassium chloride ER (K-TAB/KLOR-CON) 10 MEQ CR tablet TAKE ONE TABLET BY MOUTH THREE TIMES A DAY (Patient taking differently: Take 10 mEq by mouth 3 times daily) 180 tablet 4     pravastatin (PRAVACHOL) 40 MG tablet TAKE ONE TABLET BY MOUTH ONCE DAILY (Patient taking differently: Take 40 mg by mouth daily) 90 tablet 3       ALLERGIES     Allergies   Allergen Reactions     Codeine Nausea     Fentanyl Nausea and Vomiting     VERY sensitive to most narcotics if not all.       PAST MEDICAL HISTORY:  Past Medical History:   Diagnosis Date     Acute cholecystitis 3/23/2018     Acute kidney injury (H) 9/10/2017     Arthritis      Dehydration 9/4/2017     Diabetes type 2, controlled (H)      Elevated lactic acid level 9/10/2017     GERD (gastroesophageal reflux disease)      History of renal calculi      HTN, goal below 140/90      Hyperlipidaemia LDL goal < 100      Hypokalemia 9/3/2017     Hypothyroid      Insomnia      Left carotid stenosis      Migraine      Motion sickness      PONV (postoperative nausea and vomiting)      Sciatica of right side 6/28/2013     Type 2 diabetes mellitus without complication, without long-term current use of insulin (H) 2/16/2017       PAST SURGICAL HISTORY:  Past Surgical History:   Procedure Laterality Date     BUNIONECTOMY      Right foot     CATARACT IOL, RT/LT Bilateral 2017     COLONOSCOPY  7/12/2013    Procedure: COLONOSCOPY;  Colonoscopy;  Surgeon: Giovany Espinoza MD;  [facility]     COLONOSCOPY N/A 1/31/2019    Procedure: Combined Colonoscopy, Single Or Multiple Biopsy/Polypectomy By Biopsy;  Surgeon: Efren Osorio MD;  [facility]     COLONOSCOPY WITH CO2 INSUFFLATION N/A 1/31/2019    Procedure: COLONOSCOPY WITH CO2 INSUFFLATION;  Surgeon: Efren Osorio MD;  [facility]     FRACTURE TX, ANKLE RT/LT      Left ankle     HC CYSTOURETHROSCOPY W/ URETEROSCOPY &/OR PYELOSCOPY; W/ LITHOTRIPSY       HC REVISE MEDIAN N/CARPAL TUNNEL SURG       Right wrist     INJECT EPIDURAL LUMBAR Right 12/27/2018    Procedure: INJECT EPIDURAL LUMBAR right foraminal narrowing severe at L4-L5 and moderate at L5-S1;  Surgeon: Gilbert Mcclure MD;  [facility]     INJECT EPIDURAL TRANSFORAMINAL Bilateral 5/27/2021    Procedure: Bilateral Lumbar 5-sacral 1 Epidural steroid injection;  Surgeon: Gilbert Mcclure MD;  [facility]     INJECT EPIDURAL TRANSFORAMINAL Right 6/17/2022    Procedure: Attempted right Lumbar 4-5 and Lumbar 5-Sacral 1 epidural injections with completion of only the right Lumbar 5-Sacral1 Transforaminal Epidural Steroid Injection with fluoroscopic guidance and contrast;  Surgeon: Gilbert Mcclure MD;  [facility]     INJECT JOINT SACROILIAC  4/10/2014    Procedure: INJECT JOINT SACROILIAC;  Sacroiliac Joint Injection;  Surgeon: Gilbert Mcclure MD;  [facility]     INJECT JOINT SACROILIAC Left 4/11/2019    Procedure: INJECT JOINT SACROILIAC- LEFT;  Surgeon: Gilbert Mcclure MD;  [facility]     LAPAROSCOPIC CHOLECYSTECTOMY N/A 3/24/2018    Procedure: LAPAROSCOPIC CHOLECYSTECTOMY;  Laparoscopic Cholecystectomy;  Surgeon: Berry Allen DO;  [facility]       FAMILY HISTORY:  Family History   Problem Relation Age of Onset     Hypertension Brother      Cerebrovascular Disease Brother      Diabetes Father      Cardiovascular Father      Diabetes Brother         Borderline     Breast Cancer Mother      Diabetes Brother      Blood Disease Brother      Cardiovascular Brother         MI       SOCIAL HISTORY:  Social History     Socioeconomic History     Marital status:    Occupational History     Employer: RETIRED     Comment: 's office part time   Tobacco Use     Smoking status: Never Smoker     Smokeless tobacco: Never Used   Vaping Use     Vaping Use: Never used   Substance and Sexual Activity     Alcohol use: Yes     Drug use: No     Sexual activity: Not Currently       Review of Systems:  Skin:  Negative    "  Eyes:  Positive for glasses  ENT:  Negative    Respiratory:  Negative    Cardiovascular:  Negative for;palpitations;chest pain;lightheadedness;dizziness edema;Positive for  Gastroenterology: Negative    Genitourinary:  Negative    Musculoskeletal:  Negative    Neurologic:  Negative    Psychiatric:  Negative    Heme/Lymph/Imm:  Positive for allergies  Endocrine:  Positive for thyroid disorder;diabetes    Physical Exam:  Vitals: /68 (BP Location: Left arm, Patient Position: Sitting, Cuff Size: Adult Regular)   Pulse 60   Ht 1.645 m (5' 4.75\")   Wt 95.2 kg (209 lb 14.4 oz)   LMP  (LMP Unknown)   SpO2 96%   BMI 35.20 kg/m      Constitutional:  cooperative, alert and oriented, well developed, well nourished, in no acute distress        Skin:  warm and dry to the touch, no apparent skin lesions or masses noted        Head:  normocephalic, no masses or lesions        Eyes:  no xanthalasma;sclera white        ENT:  no pallor or cyanosis        Neck:  carotid pulses are full and equal bilaterally, JVP normal, no carotid bruit        Chest:  normal breath sounds, clear to auscultation, normal A-P diameter, normal symmetry, normal respiratory excursion, no use of accessory muscles        Cardiac: regular rhythm, normal S1/S2, no S3 or S4, apical impulse not displaced, no murmurs, gallops or rubs frequent premature beats                Abdomen:  abdomen soft;BS normoactive        Vascular: pulses full and equal, no bruits auscultated                                      Extremities and Muscular Skeletal:  no edema           Neurological:  no gross motor deficits        Psych:  affect appropriate, oriented to time, person and place     Recent Lab Results:  LIPID RESULTS:  Lab Results   Component Value Date    CHOL 157 05/13/2022    CHOL 162 01/06/2021    HDL 47 (L) 05/13/2022    HDL 41 (L) 01/06/2021    LDL 71 05/13/2022    LDL 71 01/06/2021    TRIG 196 (H) 05/13/2022    TRIG 248 (H) 01/06/2021    CHOLHDLRATIO 3.1 " 05/22/2015       LIVER ENZYME RESULTS:  Lab Results   Component Value Date    AST 27 05/13/2022    AST 24 01/06/2021    ALT 41 05/13/2022    ALT 37 01/06/2021       CBC RESULTS:  Lab Results   Component Value Date    WBC 7.5 01/12/2022    WBC 6.5 09/23/2020    RBC 4.43 01/12/2022    RBC 4.33 09/23/2020    HGB 13.7 01/12/2022    HGB 13.1 09/23/2020    HCT 41.3 01/12/2022    HCT 40.2 09/23/2020    MCV 93 01/12/2022    MCV 93 09/23/2020    MCH 30.9 01/12/2022    MCH 30.3 09/23/2020    MCHC 33.2 01/12/2022    MCHC 32.6 09/23/2020    RDW 12.2 01/12/2022    RDW 12.7 09/23/2020     01/12/2022     09/23/2020       BMP RESULTS:  Lab Results   Component Value Date     05/13/2022     01/06/2021    POTASSIUM 4.5 05/13/2022    POTASSIUM 4.3 01/06/2021    CHLORIDE 112 (H) 05/13/2022    CHLORIDE 112 (H) 01/06/2021    CO2 26 05/13/2022    CO2 30 01/06/2021    ANIONGAP 5 05/13/2022    ANIONGAP 2 (L) 01/06/2021     (H) 05/13/2022    GLC 89 01/06/2021    BUN 17 05/13/2022    BUN 21 01/06/2021    CR 0.98 05/13/2022    CR 1.04 01/06/2021    GFRESTIMATED 58 (L) 05/13/2022    GFRESTIMATED 51 (L) 01/06/2021    GFRESTBLACK 59 (L) 01/06/2021    TERESA 9.0 05/13/2022    TERESA 9.2 01/06/2021        A1C RESULTS:  Lab Results   Component Value Date    A1C 6.2 (H) 05/13/2022    A1C 6.2 (H) 12/15/2020       INR RESULTS:  No results found for: INR      Reuben Spain MD, FACC    CC  Fareed Roldan PA-C  [address] [place] [id number]    Thank you for allowing me to participate in the care of your patient.      Sincerely,     Reuben Spain MD     [facility]

## 2022-09-21 NOTE — PROGRESS NOTES
HISTORY:    Mya Hui is a pleasant 82-year-old female accompanied by her daughter today.  She has not had any cardiac issues in the past.  She does have a history of hypertension, venous insufficiency, hyperlipidemia, stage III CKD, and obesity.  She was asked to see me for atrial bigeminy.    Mya is completely asymptomatic.  She denies any sense of palpitations, syncope or near syncope, lightheadedness, strokelike symptoms, exertional chest arm neck shoulder or jaw discomfort, PND/orthopnea, peripheral edema, or claudication.    On routine physical exam the patient was noted to have an irregular pulse.  A twelve-lead ECG was done and is reviewed.  It shows bigeminal PACs and is otherwise normal.  In response to this a 3-day Zio patch was done which showed 50 episodes of SVT, all of them brief and self-limiting with the longest being 14 beats.  Her average heart rate was 70 but she was noted to have frequent PACs amounting to a 10% burden.  Cardiology consultation was requested.    Mya remains physically active.  She uses a walker because of some balance issues but feels that her stamina is normal for her age and size.  She expresses no cardiovascular concerns whatsoever.  Her cardiac risk factors are well controlled with a blood pressure that is generally normal and is 138/68 today.  Her total cholesterol is 157 with an LDL of 71.  She has not ever had any documented vascular disease.      ASSESSMENT/PLAN:    1.  Frequent asymptomatic PACs and occasional asymptomatic self-limiting runs of SVT.  These are both benign rhythms that do not require further therapy.  Examination today is essentially normal except for some premature beats.  I will arrange an echocardiogram to make sure were not missing any structural abnormalities but I think this is highly unlikely.  Since she is completely unaware of her arrhythmia and her arrhythmia is benign there is no compelling reason to initiate therapy.  I did  discuss in detail with her the fact that she is at increased risk of developing atrial fibrillation at some point in the future.  There is nothing we can do to materially change that risk and until atrial fibs is documented no change in therapy is warranted.  I did suggest that she might consider getting a Kardia device and using it once a week or so to monitor for onset of atrial fibrillation.  She seemed interested in this concept.    Thank you for inviting me to participate in the care of your patient.  Please don't hesitate to call if I can be of further assistance.  45 minutes were spent today reviewing the chart and other records, interviewing and examining the patient, and documenting our visit.  An echocardiogram will be arranged and we will contact her with results.  Unless there are unexpected abnormalities on that study, no further cardiology follow-up is needed.  Of course I would be happy to see her should further cardiology questions or issues arise.    Chart documentation was completed, in part, with [employer] voice-recognition software. Even though reviewed, some grammatical, spelling, and word errors may remain.       Orders Placed This Encounter   Procedures     Echocardiogram Complete     No orders of the defined types were placed in this encounter.    Medications Discontinued During This Encounter   Medication Reason     prochlorperazine (COMPAZINE) 5 MG tablet        10 year ASCVD risk: The ASCVD Risk score (Deepa DC Jr., et al., 2013) failed to calculate for the following reasons:    The 2013 ASCVD risk score is only valid for ages 40 to 79    Encounter Diagnoses   Name Primary?     Irregular heart rate      Bigeminy        CURRENT MEDICATIONS:  Current Outpatient Medications   Medication Sig Dispense Refill     blood glucose (ACCU-CHEK COMPACT PLUS) test strip 1 strip by In Vitro route daily Use to test blood sugar 1 times daily or as directed. 100 strip 0     blood glucose (NO BRAND SPECIFIED)  test strip Use to test blood sugar 1 times daily or as directed. 100 strip 0     blood glucose monitoring (NO BRAND SPECIFIED) meter device kit Use to test blood sugar 1 times daily or as directed. Blood Glucose Monitor Brands: ACCU-CHEK COMPACT PLUS 1 kit 0     blood glucose monitoring (SOFTCLIX) lancets 1 each by In Vitro route daily Use to test blood sugar 1 times daily or as directed. 100 each 11     cholestyramine (QUESTRAN) 4 g packet DISSOLVE AND TAKE ONE PACKET BY MOUTH TWICE A DAY WITH MEALS (Patient taking differently: Take 1 packet by mouth 2 times daily (with meals) Lunch and supper) 60 packet 11     diltiazem ER COATED BEADS (CARDIZEM CD/CARTIA XT) 120 MG 24 hr capsule TAKE ONE CAPSULE BY MOUTH TWICE A DAY (Patient taking differently: Take 120 mg by mouth 2 times daily) 180 capsule 3     Incontinence Supply Disposable (PROTECTIVE UNDERWEAR SUPER LG) MISC 3 each daily 90 each 11     levothyroxine (SYNTHROID/LEVOTHROID) 75 MCG tablet TAKE ONE TABLET BY MOUTH ONCE DAILY (Patient taking differently: Take 75 mcg by mouth daily) 90 tablet 3     lisinopril (ZESTRIL) 40 MG tablet TAKE ONE TABLET BY MOUTH ONCE DAILY (Patient taking differently: Take 40 mg by mouth daily (with dinner)) 90 tablet 2     Multiple Vitamins-Minerals (MULTIVITAL PO) Take by mouth daily (with dinner) daily       order for DME Equipment being ordered: Nike shoes and insoles 2 Units 1     oxybutynin ER (DITROPAN XL) 10 MG 24 hr tablet TAKE ONE TABLET BY MOUTH ONCE DAILY 90 tablet 0     pantoprazole (PROTONIX) 40 MG EC tablet TAKE ONE TABLET BY MOUTH TWICE A DAY (Patient taking differently: Before Lunch and supper) 180 tablet 0     polyethylene glycol (MIRALAX) 17 g packet Take 1 packet by mouth every evening        potassium chloride ER (K-TAB/KLOR-CON) 10 MEQ CR tablet TAKE ONE TABLET BY MOUTH THREE TIMES A DAY (Patient taking differently: Take 10 mEq by mouth 3 times daily) 180 tablet 4     pravastatin (PRAVACHOL) 40 MG tablet TAKE ONE  TABLET BY MOUTH ONCE DAILY (Patient taking differently: Take 40 mg by mouth daily) 90 tablet 3       ALLERGIES     Allergies   Allergen Reactions     Codeine Nausea     Fentanyl Nausea and Vomiting     VERY sensitive to most narcotics if not all.       PAST MEDICAL HISTORY:  Past Medical History:   Diagnosis Date     Acute cholecystitis 3/23/2018     Acute kidney injury (H) 9/10/2017     Arthritis      Dehydration 9/4/2017     Diabetes type 2, controlled (H)      Elevated lactic acid level 9/10/2017     GERD (gastroesophageal reflux disease)      History of renal calculi      HTN, goal below 140/90      Hyperlipidaemia LDL goal < 100      Hypokalemia 9/3/2017     Hypothyroid      Insomnia      Left carotid stenosis      Migraine      Motion sickness      PONV (postoperative nausea and vomiting)      Sciatica of right side 6/28/2013     Type 2 diabetes mellitus without complication, without long-term current use of insulin (H) 2/16/2017       PAST SURGICAL HISTORY:  Past Surgical History:   Procedure Laterality Date     BUNIONECTOMY      Right foot     CATARACT IOL, RT/LT Bilateral 2017     COLONOSCOPY  7/12/2013    Procedure: COLONOSCOPY;  Colonoscopy;  Surgeon: Giovany Espinoza MD;  [facility]     COLONOSCOPY N/A 1/31/2019    Procedure: Combined Colonoscopy, Single Or Multiple Biopsy/Polypectomy By Biopsy;  Surgeon: Efren Osorio MD;  [facility]     COLONOSCOPY WITH CO2 INSUFFLATION N/A 1/31/2019    Procedure: COLONOSCOPY WITH CO2 INSUFFLATION;  Surgeon: Efren Osorio MD;  [facility]     FRACTURE TX, ANKLE RT/LT      Left ankle     HC CYSTOURETHROSCOPY W/ URETEROSCOPY &/OR PYELOSCOPY; W/ LITHOTRIPSY       HC REVISE MEDIAN N/CARPAL TUNNEL SURG      Right wrist     INJECT EPIDURAL LUMBAR Right 12/27/2018    Procedure: INJECT EPIDURAL LUMBAR right foraminal narrowing severe at L4-L5 and moderate at L5-S1;  Surgeon: Gilbert Mcclure MD;  [facility]     INJECT  EPIDURAL TRANSFORAMINAL Bilateral 5/27/2021    Procedure: Bilateral Lumbar 5-sacral 1 Epidural steroid injection;  Surgeon: Gilbert Mcclure MD;  [facility]     INJECT EPIDURAL TRANSFORAMINAL Right 6/17/2022    Procedure: Attempted right Lumbar 4-5 and Lumbar 5-Sacral 1 epidural injections with completion of only the right Lumbar 5-Sacral1 Transforaminal Epidural Steroid Injection with fluoroscopic guidance and contrast;  Surgeon: Gilbert Mcclure MD;  [facility]     INJECT JOINT SACROILIAC  4/10/2014    Procedure: INJECT JOINT SACROILIAC;  Sacroiliac Joint Injection;  Surgeon: Gilbert Mcclure MD;  [facility]     INJECT JOINT SACROILIAC Left 4/11/2019    Procedure: INJECT JOINT SACROILIAC- LEFT;  Surgeon: Gilbert Mcclure MD;  [facility]     LAPAROSCOPIC CHOLECYSTECTOMY N/A 3/24/2018    Procedure: LAPAROSCOPIC CHOLECYSTECTOMY;  Laparoscopic Cholecystectomy;  Surgeon: Berry Allen DO;  [facility]       FAMILY HISTORY:  Family History   Problem Relation Age of Onset     Hypertension Brother      Cerebrovascular Disease Brother      Diabetes Father      Cardiovascular Father      Diabetes Brother         Borderline     Breast Cancer Mother      Diabetes Brother      Blood Disease Brother      Cardiovascular Brother         MI       SOCIAL HISTORY:  Social History     Socioeconomic History     Marital status:    Occupational History     Employer: RETIRED     Comment: 's office part time   Tobacco Use     Smoking status: Never Smoker     Smokeless tobacco: Never Used   Vaping Use     Vaping Use: Never used   Substance and Sexual Activity     Alcohol use: Yes     Drug use: No     Sexual activity: Not Currently       Review of Systems:  Skin:  Negative     Eyes:  Positive for glasses  ENT:  Negative    Respiratory:  Negative    Cardiovascular:  Negative for;palpitations;chest pain;lightheadedness;dizziness edema;Positive for  Gastroenterology: Negative    Genitourinary:   "Negative    Musculoskeletal:  Negative    Neurologic:  Negative    Psychiatric:  Negative    Heme/Lymph/Imm:  Positive for allergies  Endocrine:  Positive for thyroid disorder;diabetes    Physical Exam:  Vitals: /68 (BP Location: Left arm, Patient Position: Sitting, Cuff Size: Adult Regular)   Pulse 60   Ht 1.645 m (5' 4.75\")   Wt 95.2 kg (209 lb 14.4 oz)   LMP  (LMP Unknown)   SpO2 96%   BMI 35.20 kg/m      Constitutional:  cooperative, alert and oriented, well developed, well nourished, in no acute distress        Skin:  warm and dry to the touch, no apparent skin lesions or masses noted        Head:  normocephalic, no masses or lesions        Eyes:  no xanthalasma;sclera white        ENT:  no pallor or cyanosis        Neck:  carotid pulses are full and equal bilaterally, JVP normal, no carotid bruit        Chest:  normal breath sounds, clear to auscultation, normal A-P diameter, normal symmetry, normal respiratory excursion, no use of accessory muscles        Cardiac: regular rhythm, normal S1/S2, no S3 or S4, apical impulse not displaced, no murmurs, gallops or rubs frequent premature beats                Abdomen:  abdomen soft;BS normoactive        Vascular: pulses full and equal, no bruits auscultated                                      Extremities and Muscular Skeletal:  no edema           Neurological:  no gross motor deficits        Psych:  affect appropriate, oriented to time, person and place     Recent Lab Results:  LIPID RESULTS:  Lab Results   Component Value Date    CHOL 157 05/13/2022    CHOL 162 01/06/2021    HDL 47 (L) 05/13/2022    HDL 41 (L) 01/06/2021    LDL 71 05/13/2022    LDL 71 01/06/2021    TRIG 196 (H) 05/13/2022    TRIG 248 (H) 01/06/2021    CHOLHDLRATIO 3.1 05/22/2015       LIVER ENZYME RESULTS:  Lab Results   Component Value Date    AST 27 05/13/2022    AST 24 01/06/2021    ALT 41 05/13/2022    ALT 37 01/06/2021       CBC RESULTS:  Lab Results   Component Value Date    WBC " 7.5 01/12/2022    WBC 6.5 09/23/2020    RBC 4.43 01/12/2022    RBC 4.33 09/23/2020    HGB 13.7 01/12/2022    HGB 13.1 09/23/2020    HCT 41.3 01/12/2022    HCT 40.2 09/23/2020    MCV 93 01/12/2022    MCV 93 09/23/2020    MCH 30.9 01/12/2022    MCH 30.3 09/23/2020    MCHC 33.2 01/12/2022    MCHC 32.6 09/23/2020    RDW 12.2 01/12/2022    RDW 12.7 09/23/2020     01/12/2022     09/23/2020       BMP RESULTS:  Lab Results   Component Value Date     05/13/2022     01/06/2021    POTASSIUM 4.5 05/13/2022    POTASSIUM 4.3 01/06/2021    CHLORIDE 112 (H) 05/13/2022    CHLORIDE 112 (H) 01/06/2021    CO2 26 05/13/2022    CO2 30 01/06/2021    ANIONGAP 5 05/13/2022    ANIONGAP 2 (L) 01/06/2021     (H) 05/13/2022    GLC 89 01/06/2021    BUN 17 05/13/2022    BUN 21 01/06/2021    CR 0.98 05/13/2022    CR 1.04 01/06/2021    GFRESTIMATED 58 (L) 05/13/2022    GFRESTIMATED 51 (L) 01/06/2021    GFRESTBLACK 59 (L) 01/06/2021    TERESA 9.0 05/13/2022    TERESA 9.2 01/06/2021        A1C RESULTS:  Lab Results   Component Value Date    A1C 6.2 (H) 05/13/2022    A1C 6.2 (H) 12/15/2020       INR RESULTS:  No results found for: INR      Reuben Spain MD, FACC    CC  Fareed Roldan, PAGELA  [address] [place] [address]

## 2022-09-28 DIAGNOSIS — I10 ESSENTIAL HYPERTENSION WITH GOAL BLOOD PRESSURE LESS THAN 140/90: ICD-10-CM

## 2022-09-28 DIAGNOSIS — E11.9 TYPE 2 DIABETES MELLITUS WITHOUT COMPLICATION, WITHOUT LONG-TERM CURRENT USE OF INSULIN (H): ICD-10-CM

## 2022-09-29 RX ORDER — LISINOPRIL 40 MG/1
TABLET ORAL
Qty: 90 TABLET | Refills: 1 | Status: SHIPPED | OUTPATIENT
Start: 2022-09-29 | End: 2023-04-14

## 2022-09-30 RX ORDER — LANCETS
EACH MISCELLANEOUS
Qty: 100 EACH | Refills: 11 | Status: SHIPPED | OUTPATIENT
Start: 2022-09-30

## 2022-09-30 NOTE — TELEPHONE ENCOUNTER
Pending Prescriptions:                       Disp   Refills    blood glucose monitoring (ACCU-CHEK FASTCL*       11       Sig: USE TO TEST BLOOD SUGARS ONCE DAILY        Routing refill request to provider for review/approval because:  Supplies have not been filled since 2020. RN is unable to fill.     Jose Jacques, JOSE RN, RN, PHN  [place]  September 30, 2022

## 2022-10-07 ENCOUNTER — HOSPITAL ENCOUNTER (OUTPATIENT)
Dept: CARDIOLOGY | Facility: CLINIC | Age: 82
Discharge: HOME | End: 2022-10-07
Attending: INTERNAL MEDICINE | Admitting: INTERNAL MEDICINE
Payer: COMMERCIAL

## 2022-10-07 DIAGNOSIS — I49.8 BIGEMINY: ICD-10-CM

## 2022-10-07 DIAGNOSIS — I49.9 IRREGULAR HEART RATE: ICD-10-CM

## 2022-10-07 LAB — LVEF ECHO: NORMAL

## 2022-10-07 PROCEDURE — 93306 TTE W/DOPPLER COMPLETE: CPT | Mod: 26 | Performed by: INTERNAL MEDICINE

## 2022-10-07 PROCEDURE — 255N000002 HC RX 255 OP 636: Performed by: INTERNAL MEDICINE

## 2022-10-07 RX ADMIN — HUMAN ALBUMIN MICROSPHERES AND PERFLUTREN 4 ML: 10; .22 INJECTION, SOLUTION INTRAVENOUS at 14:34

## 2022-10-11 NOTE — RESULT ENCOUNTER NOTE
Patient notified of results EF normal and no regiional wall motion abnormalities.   Inessa Vaca RN on 10/11/2022 at 8:57 AM

## 2022-11-23 ENCOUNTER — HOSPITAL ENCOUNTER (OUTPATIENT)
Dept: MAMMOGRAPHY | Facility: CLINIC | Age: 82
Discharge: HOME | End: 2022-11-23
Attending: PHYSICIAN ASSISTANT | Admitting: PHYSICIAN ASSISTANT
Payer: COMMERCIAL

## 2022-11-23 DIAGNOSIS — Z12.31 SCREENING MAMMOGRAM, ENCOUNTER FOR: ICD-10-CM

## 2022-11-23 DIAGNOSIS — K21.9 GASTROESOPHAGEAL REFLUX DISEASE: ICD-10-CM

## 2022-11-23 PROCEDURE — 77067 SCR MAMMO BI INCL CAD: CPT

## 2022-11-28 RX ORDER — PANTOPRAZOLE SODIUM 40 MG/1
TABLET, DELAYED RELEASE ORAL
Qty: 180 TABLET | Refills: 0 | Status: SHIPPED | OUTPATIENT
Start: 2022-11-28 | End: 2023-02-22

## 2022-12-08 ENCOUNTER — OFFICE VISIT (OUTPATIENT)
Dept: ORTHOPEDICS | Facility: OTHER | Age: 82
End: 2022-12-08
Payer: COMMERCIAL

## 2022-12-08 VITALS
RESPIRATION RATE: 20 BRPM | BODY MASS INDEX: 35.68 KG/M2 | HEIGHT: 64 IN | SYSTOLIC BLOOD PRESSURE: 173 MMHG | WEIGHT: 209 LBS | DIASTOLIC BLOOD PRESSURE: 72 MMHG | HEART RATE: 66 BPM

## 2022-12-08 DIAGNOSIS — M54.31 SCIATICA OF RIGHT SIDE: Primary | ICD-10-CM

## 2022-12-08 PROCEDURE — 99213 OFFICE O/P EST LOW 20 MIN: CPT | Performed by: ORTHOPAEDIC SURGERY

## 2022-12-08 NOTE — LETTER
12/8/2022         RE: Mya Hui  [address]  [facility] [address]        Dear Colleague,    Thank you for referring your patient, Mya Hui, to the [facility] [place]. Please see a copy of my visit note below.    Mya Hui is a 82 year old female who is seen in follow up right knee pain.  She has had pain since spring in the thigh and knee.  She has dull, aching pain rated 4 out of 10.  She currently does not feel pain with walking.  She has had some back issues as well and received epidural injection in June.   Apparently the right leg pain got worse during and after the epidural.  She is concerned about her knee, but has pain at rest and sitting, not with walking.  She has difficulty getting up to walk due to leg weakness.    X-ray of the knee shows no significant arthritic changes.  She is had ultrasound in the emergency room to rule out DVT which was negative.    Past Medical History:   Diagnosis Date     Acute cholecystitis 3/23/2018     Acute kidney injury (H) 9/10/2017     Arthritis      Dehydration 9/4/2017     Diabetes type 2, controlled (H)      Elevated lactic acid level 9/10/2017     GERD (gastroesophageal reflux disease)      History of renal calculi      HTN, goal below 140/90      Hyperlipidaemia LDL goal < 100      Hypokalemia 9/3/2017     Hypothyroid      Insomnia      Left carotid stenosis      Migraine      Motion sickness      PONV (postoperative nausea and vomiting)      Sciatica of right side 6/28/2013     Type 2 diabetes mellitus without complication, without long-term current use of insulin (H) 2/16/2017       Past Surgical History:   Procedure Laterality Date     BUNIONECTOMY      Right foot     CATARACT IOL, RT/LT Bilateral 2017     COLONOSCOPY  7/12/2013    Procedure: COLONOSCOPY;  Colonoscopy;  Surgeon: Giovany Espinoza MD;  Location:  GI     COLONOSCOPY N/A 1/31/2019    Procedure: Combined Colonoscopy, Single Or  Multiple Biopsy/Polypectomy By Biopsy;  Surgeon: Efren Osorio MD;  [facility]     COLONOSCOPY WITH CO2 INSUFFLATION N/A 1/31/2019    Procedure: COLONOSCOPY WITH CO2 INSUFFLATION;  Surgeon: Efren Osorio MD;  [facility]     FRACTURE TX, ANKLE RT/LT      Left ankle     HC CYSTOURETHROSCOPY W/ URETEROSCOPY &/OR PYELOSCOPY; W/ LITHOTRIPSY       HC REVISE MEDIAN N/CARPAL TUNNEL SURG      Right wrist     INJECT EPIDURAL LUMBAR Right 12/27/2018    Procedure: INJECT EPIDURAL LUMBAR right foraminal narrowing severe at L4-L5 and moderate at L5-S1;  Surgeon: Gilbert Mcclure MD;  [facility]     INJECT EPIDURAL TRANSFORAMINAL Bilateral 5/27/2021    Procedure: Bilateral Lumbar 5-sacral 1 Epidural steroid injection;  Surgeon: Gilbert Mcclure MD;  [facility]     INJECT EPIDURAL TRANSFORAMINAL Right 6/17/2022    Procedure: Attempted right Lumbar 4-5 and Lumbar 5-Sacral 1 epidural injections with completion of only the right Lumbar 5-Sacral1 Transforaminal Epidural Steroid Injection with fluoroscopic guidance and contrast;  Surgeon: Gilbert Mcclure MD;  [facility]     INJECT JOINT SACROILIAC  4/10/2014    Procedure: INJECT JOINT SACROILIAC;  Sacroiliac Joint Injection;  Surgeon: Gilbert Mcclure MD;  [facility]     INJECT JOINT SACROILIAC Left 4/11/2019    Procedure: INJECT JOINT SACROILIAC- LEFT;  Surgeon: Gilbert Mcclure MD;  [facility]     LAPAROSCOPIC CHOLECYSTECTOMY N/A 3/24/2018    Procedure: LAPAROSCOPIC CHOLECYSTECTOMY;  Laparoscopic Cholecystectomy;  Surgeon: Berry Allen DO;  [facility]       Family History   Problem Relation Age of Onset     Hypertension Brother      Cerebrovascular Disease Brother      Diabetes Father      Cardiovascular Father      Diabetes Brother         Borderline     Breast Cancer Mother      Diabetes Brother      Blood Disease Brother      Cardiovascular Brother         MI       Social History     Socioeconomic History      Marital status:      Spouse name: Not on file     Number of children: Not on file     Years of education: Not on file     Highest education level: Not on file   Occupational History     Employer: RETIRED     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 Not Asked   Social History Narrative     Not on file     Social Determinants of Health     Financial Resource Strain: Not on file   Food Insecurity: Not on file   Transportation Needs: Not on file   Physical Activity: Not on file   Stress: Not on file   Social Connections: Not on file   Intimate Partner Violence: Not on file   Housing Stability: Not on file       Current Outpatient Medications   Medication Sig Dispense Refill     lisinopril (ZESTRIL) 40 MG tablet TAKE ONE TABLET BY MOUTH ONCE DAILY 90 tablet 1     blood glucose (ACCU-CHEK COMPACT PLUS) test strip 1 strip by In Vitro route daily Use to test blood sugar 1 times daily or as directed. 100 strip 0     blood glucose (NO BRAND SPECIFIED) test strip Use to test blood sugar 1 times daily or as directed. 100 strip 0     blood glucose monitoring (ACCU-CHEK FASTCLIX) lancets USE TO TEST BLOOD SUGARS ONCE DAILY 100 each 11     blood glucose monitoring (NO BRAND SPECIFIED) meter device kit Use to test blood sugar 1 times daily or as directed. Blood Glucose Monitor Brands: ACCU-CHEK COMPACT PLUS 1 kit 0     cholestyramine (QUESTRAN) 4 g packet DISSOLVE AND TAKE ONE PACKET BY MOUTH TWICE A DAY WITH MEALS (Patient taking differently: Take 1 packet by mouth 2 times daily (with meals) Lunch and supper) 60 packet 11     diltiazem ER COATED BEADS (CARDIZEM CD/CARTIA XT) 120 MG 24 hr capsule TAKE ONE CAPSULE BY MOUTH TWICE A  DAY (Patient taking differently: Take 120 mg by mouth 2 times daily) 180 capsule 3     Incontinence Supply Disposable (PROTECTIVE UNDERWEAR SUPER LG) MISC 3 each daily 90 each 11     levothyroxine (SYNTHROID/LEVOTHROID) 75 MCG tablet TAKE ONE TABLET BY MOUTH ONCE DAILY (Patient taking differently: Take 75 mcg by mouth daily) 90 tablet 3     Multiple Vitamins-Minerals (MULTIVITAL PO) Take by mouth daily (with dinner) daily       order for DME Equipment being ordered: Nike shoes and insoles 2 Units 1     oxybutynin ER (DITROPAN XL) 10 MG 24 hr tablet TAKE ONE TABLET BY MOUTH ONCE DAILY 90 tablet 0     pantoprazole (PROTONIX) 40 MG EC tablet TAKE ONE TABLET BY MOUTH TWICE A  tablet 0     polyethylene glycol (MIRALAX) 17 g packet Take 1 packet by mouth every evening        potassium chloride ER (K-TAB/KLOR-CON) 10 MEQ CR tablet TAKE ONE TABLET BY MOUTH THREE TIMES A DAY (Patient taking differently: Take 10 mEq by mouth 3 times daily) 180 tablet 4     pravastatin (PRAVACHOL) 40 MG tablet TAKE ONE TABLET BY MOUTH ONCE DAILY (Patient taking differently: Take 40 mg by mouth daily) 90 tablet 3       Allergies   Allergen Reactions     Codeine Nausea     Fentanyl Nausea and Vomiting     VERY sensitive to most narcotics if not all.       REVIEW OF SYSTEMS:  CONSTITUTIONAL:  NEGATIVE for fever, chills, change in weight, not feeling tired  SKIN:  NEGATIVE for worrisome rashes, no skin lumps, no skin ulcers and no non-healing wounds  EYES:  NEGATIVE for vision changes or irritation.  ENT/MOUTH:  NEGATIVE.  No hearing loss, no hoarseness, no difficulty swallowing.  RESP:  NEGATIVE. No cough or shortness of breath.  CV:  NEGATIVE for chest pain, palpitations or peripheral edema  GI:  NEGATIVE for nausea, abdominal pain, heartburn, or change in bowel habits  :  Negative. No dysuria, no hematuria  MUSCULOSKELETAL:  See HPI above  NEURO:  NEGATIVE . No headaches, no dizziness,  no numbness  ENDOCRINE:  NEGATIVE for temperature  "intolerance, skin/hair changes  HEME/ALLERGY/IMMUNE:  NEGATIVE for bleeding problems  PSYCHIATRIC:  NEGATIVE. no anxiety, no depression.     Exam:  Vitals: BP (!) 173/72   Pulse 66   Resp 20   Ht 1.632 m (5' 4.25\")   Wt 94.8 kg (209 lb)   LMP  (LMP Unknown)   BMI 35.60 kg/m    BMI= Body mass index is 35.6 kg/m .  Constitutional:  healthy, alert and no distress  Neuro: Alert and Oriented x 3, no focal defects.  Psych: Affect normal   Respiratory: Breathing not labored.  Cardiovascular: normal peripheral pulses  Lymph: no adenopathy  Skin: No rashes,worrisome lesions or skin problems  She has pain in the thigh with straight leg raising on the right to 80 degrees, negative on the left.  She has full range of motion of both knees of 0-120 degrees without pain.  Tr's were negative on the left and right knees.  She has no effusion on the right knee now.  There is some tenderness of the right knee at the mid medial joint.  Sensation, motor and circulation are intact.    Assessment:  Right leg pain without osteoarthritis of knee.    2. Lumbar spinal stenosis with right sciatica.  Steroid injection seems to have made this worse.    Plan: This appears to be from the spine.  I recommend follow up with Dr. Waite for further treatment.        Again, thank you for allowing me to participate in the care of your patient.        Sincerely,        Naman Choe MD    "

## 2022-12-08 NOTE — PROGRESS NOTES
Mya Hui is a 82 year old female who is seen in follow up right knee pain.  She has had pain since spring in the thigh and knee.  She has dull, aching pain rated 4 out of 10.  She currently does not feel pain with walking.  She has had some back issues as well and received epidural injection in June.   Apparently the right leg pain got worse during and after the epidural.  She is concerned about her knee, but has pain at rest and sitting, not with walking.  She has difficulty getting up to walk due to leg weakness.    X-ray of the knee shows no significant arthritic changes.  She is had ultrasound in the emergency room to rule out DVT which was negative.    Past Medical History:   Diagnosis Date     Acute cholecystitis 3/23/2018     Acute kidney injury (H) 9/10/2017     Arthritis      Dehydration 9/4/2017     Diabetes type 2, controlled (H)      Elevated lactic acid level 9/10/2017     GERD (gastroesophageal reflux disease)      History of renal calculi      HTN, goal below 140/90      Hyperlipidaemia LDL goal < 100      Hypokalemia 9/3/2017     Hypothyroid      Insomnia      Left carotid stenosis      Migraine      Motion sickness      PONV (postoperative nausea and vomiting)      Sciatica of right side 6/28/2013     Type 2 diabetes mellitus without complication, without long-term current use of insulin (H) 2/16/2017       Past Surgical History:   Procedure Laterality Date     BUNIONECTOMY      Right foot     CATARACT IOL, RT/LT Bilateral 2017     COLONOSCOPY  7/12/2013    Procedure: COLONOSCOPY;  Colonoscopy;  Surgeon: Giovany Espinoza MD;  Location: [facility]     COLONOSCOPY N/A 1/31/2019    Procedure: Combined Colonoscopy, Single Or Multiple Biopsy/Polypectomy By Biopsy;  Surgeon: Efren Osorio MD;  Location: [facility]     COLONOSCOPY WITH CO2 INSUFFLATION N/A 1/31/2019    Procedure: COLONOSCOPY WITH CO2 INSUFFLATION;  Surgeon: Efren Osorio MD;  Location: [facility]     FRACTURE TX,  ANKLE RT/LT      Left ankle     HC CYSTOURETHROSCOPY W/ URETEROSCOPY &/OR PYELOSCOPY; W/ LITHOTRIPSY       HC REVISE MEDIAN N/CARPAL TUNNEL SURG      Right wrist     INJECT EPIDURAL LUMBAR Right 12/27/2018    Procedure: INJECT EPIDURAL LUMBAR right foraminal narrowing severe at L4-L5 and moderate at L5-S1;  Surgeon: Gilbert Mcclure MD;  [facility]     INJECT EPIDURAL TRANSFORAMINAL Bilateral 5/27/2021    Procedure: Bilateral Lumbar 5-sacral 1 Epidural steroid injection;  Surgeon: Gilbert Mcclure MD;  [facility]     INJECT EPIDURAL TRANSFORAMINAL Right 6/17/2022    Procedure: Attempted right Lumbar 4-5 and Lumbar 5-Sacral 1 epidural injections with completion of only the right Lumbar 5-Sacral1 Transforaminal Epidural Steroid Injection with fluoroscopic guidance and contrast;  Surgeon: Gilbert Mcclure MD;  [facility]     INJECT JOINT SACROILIAC  4/10/2014    Procedure: INJECT JOINT SACROILIAC;  Sacroiliac Joint Injection;  Surgeon: Gilbert Mcclure MD;  [facility]     INJECT JOINT SACROILIAC Left 4/11/2019    Procedure: INJECT JOINT SACROILIAC- LEFT;  Surgeon: Gilbert Mcclure MD;  [facility]     LAPAROSCOPIC CHOLECYSTECTOMY N/A 3/24/2018    Procedure: LAPAROSCOPIC CHOLECYSTECTOMY;  Laparoscopic Cholecystectomy;  Surgeon: Berry Allen DO;  [facility]       Family History   Problem Relation Age of Onset     Hypertension Brother      Cerebrovascular Disease Brother      Diabetes Father      Cardiovascular Father      Diabetes Brother         Borderline     Breast Cancer Mother      Diabetes Brother      Blood Disease Brother      Cardiovascular Brother         MI       Social History     Socioeconomic History     Marital status:      Spouse name: Not on file     Number of children: Not on file     Years of education: Not on file     Highest education level: Not on file   Occupational History     Employer: RETIRED     Comment: [employer] part time   Tobacco Use      Smoking status: Never     Smokeless tobacco: Never   Vaping Use     Vaping Use: Never used   Substance and Sexual Activity     Alcohol use: Yes     Drug use: No     Sexual activity: Not Currently   Other Topics Concern     Parent/sibling w/ CABG, MI or angioplasty before 65F 55M? Not Asked   Social History Narrative     Not on file     Social Determinants of Health     Financial Resource Strain: Not on file   Food Insecurity: Not on file   Transportation Needs: Not on file   Physical Activity: Not on file   Stress: Not on file   Social Connections: Not on file   Intimate Partner Violence: Not on file   Housing Stability: Not on file       Current Outpatient Medications   Medication Sig Dispense Refill     lisinopril (ZESTRIL) 40 MG tablet TAKE ONE TABLET BY MOUTH ONCE DAILY 90 tablet 1     blood glucose (ACCU-CHEK COMPACT PLUS) test strip 1 strip by In Vitro route daily Use to test blood sugar 1 times daily or as directed. 100 strip 0     blood glucose (NO BRAND SPECIFIED) test strip Use to test blood sugar 1 times daily or as directed. 100 strip 0     blood glucose monitoring (ACCU-CHEK FASTCLIX) lancets USE TO TEST BLOOD SUGARS ONCE DAILY 100 each 11     blood glucose monitoring (NO BRAND SPECIFIED) meter device kit Use to test blood sugar 1 times daily or as directed. Blood Glucose Monitor Brands: ACCU-CHEK COMPACT PLUS 1 kit 0     cholestyramine (QUESTRAN) 4 g packet DISSOLVE AND TAKE ONE PACKET BY MOUTH TWICE A DAY WITH MEALS (Patient taking differently: Take 1 packet by mouth 2 times daily (with meals) Lunch and supper) 60 packet 11     diltiazem ER COATED BEADS (CARDIZEM CD/CARTIA XT) 120 MG 24 hr capsule TAKE ONE CAPSULE BY MOUTH TWICE A DAY (Patient taking differently: Take 120 mg by mouth 2 times daily) 180 capsule 3     Incontinence Supply Disposable (PROTECTIVE UNDERWEAR SUPER LG) MISC 3 each daily 90 each 11     levothyroxine (SYNTHROID/LEVOTHROID) 75 MCG tablet TAKE ONE TABLET BY MOUTH ONCE DAILY  "(Patient taking differently: Take 75 mcg by mouth daily) 90 tablet 3     Multiple Vitamins-Minerals (MULTIVITAL PO) Take by mouth daily (with dinner) daily       order for DME Equipment being ordered: Nike shoes and insoles 2 Units 1     oxybutynin ER (DITROPAN XL) 10 MG 24 hr tablet TAKE ONE TABLET BY MOUTH ONCE DAILY 90 tablet 0     pantoprazole (PROTONIX) 40 MG EC tablet TAKE ONE TABLET BY MOUTH TWICE A  tablet 0     polyethylene glycol (MIRALAX) 17 g packet Take 1 packet by mouth every evening        potassium chloride ER (K-TAB/KLOR-CON) 10 MEQ CR tablet TAKE ONE TABLET BY MOUTH THREE TIMES A DAY (Patient taking differently: Take 10 mEq by mouth 3 times daily) 180 tablet 4     pravastatin (PRAVACHOL) 40 MG tablet TAKE ONE TABLET BY MOUTH ONCE DAILY (Patient taking differently: Take 40 mg by mouth daily) 90 tablet 3       Allergies   Allergen Reactions     Codeine Nausea     Fentanyl Nausea and Vomiting     VERY sensitive to most narcotics if not all.       REVIEW OF SYSTEMS:  CONSTITUTIONAL:  NEGATIVE for fever, chills, change in weight, not feeling tired  SKIN:  NEGATIVE for worrisome rashes, no skin lumps, no skin ulcers and no non-healing wounds  EYES:  NEGATIVE for vision changes or irritation.  ENT/MOUTH:  NEGATIVE.  No hearing loss, no hoarseness, no difficulty swallowing.  RESP:  NEGATIVE. No cough or shortness of breath.  CV:  NEGATIVE for chest pain, palpitations or peripheral edema  GI:  NEGATIVE for nausea, abdominal pain, heartburn, or change in bowel habits  :  Negative. No dysuria, no hematuria  MUSCULOSKELETAL:  See HPI above  NEURO:  NEGATIVE . No headaches, no dizziness,  no numbness  ENDOCRINE:  NEGATIVE for temperature intolerance, skin/hair changes  HEME/ALLERGY/IMMUNE:  NEGATIVE for bleeding problems  PSYCHIATRIC:  NEGATIVE. no anxiety, no depression.     Exam:  Vitals: BP (!) 173/72   Pulse 66   Resp 20   Ht 1.632 m (5' 4.25\")   Wt 94.8 kg (209 lb)   LMP  (LMP Unknown)   BMI " 35.60 kg/m    BMI= Body mass index is 35.6 kg/m .  Constitutional:  healthy, alert and no distress  Neuro: Alert and Oriented x 3, no focal defects.  Psych: Affect normal   Respiratory: Breathing not labored.  Cardiovascular: normal peripheral pulses  Lymph: no adenopathy  Skin: No rashes,worrisome lesions or skin problems  She has pain in the thigh with straight leg raising on the right to 80 degrees, negative on the left.  She has full range of motion of both knees of 0-120 degrees without pain.  Tr's were negative on the left and right knees.  She has no effusion on the right knee now.  There is some tenderness of the right knee at the mid medial joint.  Sensation, motor and circulation are intact.    Assessment:  Right leg pain without osteoarthritis of knee.    2. Lumbar spinal stenosis with right sciatica.  Steroid injection seems to have made this worse.    Plan: This appears to be from the spine.  I recommend follow up with Dr. Waite for further treatment.

## 2022-12-30 DIAGNOSIS — I10 ESSENTIAL HYPERTENSION WITH GOAL BLOOD PRESSURE LESS THAN 140/90: ICD-10-CM

## 2022-12-30 NOTE — TELEPHONE ENCOUNTER
Pt scheduled for LESI  Date:5/27/21  Time:2pm  Dr. Mcclure    Instructed pt to have H&P and  for procedure.  Patient informed of COVID testing process.   Unknown if ever smoked

## 2023-01-03 RX ORDER — DILTIAZEM HYDROCHLORIDE 120 MG/1
CAPSULE, COATED, EXTENDED RELEASE ORAL
Qty: 180 CAPSULE | Refills: 3 | Status: SHIPPED | OUTPATIENT
Start: 2023-01-03 | End: 2023-05-02

## 2023-01-03 NOTE — TELEPHONE ENCOUNTER
"Pending Prescriptions:                       Disp   Refills    diltiazem ER COATED BEADS (CARDIZEM CD/CAR*180 ca*3        Sig: TAKE ONE CAPSULE BY MOUTH TWICE A DAY        Routing refill request to provider for review/approval because:  Calcium Channel Blockers Protocol  Failed    Rerun Protocol (12/30/2022 1:54 PM)    Blood pressure under 140/90 in past 12 months      BP Readings from Last 3 Encounters:   12/08/22 (!) 173/72   09/21/22 138/68   09/15/22 117/69           Normal ALT in past 12 months      Recent Labs   Lab Test 05/13/22  1154   ALT 41       Recent (12 mo) or future (30 days) visit within the authorizing provider's specialty  Patient has had an office visit with the authorizing provider or a provider within the authorizing providers department within the previous 12 mos or has a future within next 30 days. See \"Patient Info\" tab in inbasket, or \"Choose Columns\" in Meds & Orders section of the refill encounter.         Medication is active on med list      Patient is age 18 or older      No active pregnancy on record      Normal serum creatinine on file in past 12 months      Recent Labs   Lab Test 05/13/22  1154   CR 0.98      Ok to refill medication if creatinine is low    No positive pregnancy test in past 12 months          "

## 2023-01-09 ENCOUNTER — TELEPHONE (OUTPATIENT)
Dept: NEUROSURGERY | Facility: OTHER | Age: 83
End: 2023-01-09

## 2023-01-09 NOTE — TELEPHONE ENCOUNTER
PRE VISIT planning.    Saw Mariann 5/6/21. Orders for BLANCA, PT, and pain management. did not see pain management.  BLANCA didn't help June 2022. Saw Dr. Choe on 12/8/22 for right sciatica pain who recommended f/u with Mariann. Patient has Right leg weakness started a long time ago.  Last lumbar MRI 5/3/21.    Patient would prefer non-surgical options for pain/weakness but willing to consider surgical options as she's had it with these symptoms.

## 2023-01-09 NOTE — TELEPHONE ENCOUNTER
LM for patient to return call to clinic. I would like to talk with her about her upcoming visit.    Mansi Quinonez RN on 1/9/2023 at 1:44 PM

## 2023-01-09 NOTE — TELEPHONE ENCOUNTER
Patient called stating that the imaging department didn't receive the MRI order. Please fax to [phone] Thank you ~

## 2023-01-09 NOTE — TELEPHONE ENCOUNTER
Per Dr. Waite, orders for a lumbar MRI to be completed prior to office visit Thursday. Ordered MRI. To be completed at [facility] in [place] secondary to needing an open MRI.    Patient updated and is able able to schedule for Tuesday (if there are appointment openings). If not, patient to obtain MRI and reschedule office visit with Dr. Waite to after MRI date.    Patient understands.    Mansi Quinonez RN on 1/9/2023 at 2:50 PM

## 2023-01-12 ENCOUNTER — PRE VISIT (OUTPATIENT)
Dept: NEUROSURGERY | Facility: CLINIC | Age: 83
End: 2023-01-12

## 2023-01-12 DIAGNOSIS — M54.16 LUMBAR RADICULOPATHY: Primary | ICD-10-CM

## 2023-01-13 DIAGNOSIS — E11.9 TYPE 2 DIABETES MELLITUS WITHOUT COMPLICATION, WITHOUT LONG-TERM CURRENT USE OF INSULIN (H): ICD-10-CM

## 2023-01-16 RX ORDER — BLOOD SUGAR DIAGNOSTIC
STRIP MISCELLANEOUS
Qty: 100 STRIP | Refills: 0 | Status: SHIPPED | OUTPATIENT
Start: 2023-01-16 | End: 2023-05-02

## 2023-01-26 ENCOUNTER — OFFICE VISIT (OUTPATIENT)
Dept: NEUROSURGERY | Facility: CLINIC | Age: 83
End: 2023-01-26
Payer: COMMERCIAL

## 2023-01-26 DIAGNOSIS — M54.41 CHRONIC RIGHT-SIDED LOW BACK PAIN WITH RIGHT-SIDED SCIATICA: Primary | ICD-10-CM

## 2023-01-26 DIAGNOSIS — M12.9 ARTHROPATHY, UNSPECIFIED: ICD-10-CM

## 2023-01-26 DIAGNOSIS — G89.29 CHRONIC RIGHT-SIDED LOW BACK PAIN WITH RIGHT-SIDED SCIATICA: Primary | ICD-10-CM

## 2023-01-26 PROCEDURE — 99213 OFFICE O/P EST LOW 20 MIN: CPT | Performed by: NEUROLOGICAL SURGERY

## 2023-01-26 NOTE — PROGRESS NOTES
81F w/ lumbar scoliosis, stenosis, severe back pain.  Several years of aching, axial back pain.  Notes 7/10 throbbing back pain with radiation to the right thigh, worse with activity and walking.   Did PT and BLANCA in 2018 with excellent relief.  Scans show scoliosis with marked right-kelley curvature, stenosis, and listhesis.    Returns for follow up.  Continued severe right lower back and leg pain.  Underwent BLANCA this summer without improvement.  She saw Dr. Choe for eval who didn't note any orthopedic issues.  New MR Lumbar, personally reviewed, shows marked scoliosis, multiple levels of severe foraminal stenosis, and L3-4 moderate/severe central and lateral recess stenosis.    Past Medical History:   Diagnosis Date     Acute cholecystitis 3/23/2018     Acute kidney injury (H) 9/10/2017     Arthritis      Dehydration 9/4/2017     Diabetes type 2, controlled (H)      Elevated lactic acid level 9/10/2017     GERD (gastroesophageal reflux disease)      History of renal calculi      HTN, goal below 140/90      Hyperlipidaemia LDL goal < 100      Hypokalemia 9/3/2017     Hypothyroid      Insomnia      Left carotid stenosis      Migraine      Motion sickness      PONV (postoperative nausea and vomiting)      Sciatica of right side 6/28/2013     Type 2 diabetes mellitus without complication, without long-term current use of insulin (H) 2/16/2017     Past Surgical History:   Procedure Laterality Date     BUNIONECTOMY      Right foot     CATARACT IOL, RT/LT Bilateral 2017     COLONOSCOPY  7/12/2013    Procedure: COLONOSCOPY;  Colonoscopy;  Surgeon: Giovany Espinoza MD;  [facility]     COLONOSCOPY N/A 1/31/2019    Procedure: Combined Colonoscopy, Single Or Multiple Biopsy/Polypectomy By Biopsy;  Surgeon: Efren Osorio MD;  [facility]     COLONOSCOPY WITH CO2 INSUFFLATION N/A 1/31/2019    Procedure: COLONOSCOPY WITH CO2 INSUFFLATION;  Surgeon: Efren Osorio MD;  Location: [facility]      FRACTURE TX, ANKLE RT/LT      Left ankle     HC CYSTOURETHROSCOPY W/ URETEROSCOPY &/OR PYELOSCOPY; W/ LITHOTRIPSY       HC REVISE MEDIAN N/CARPAL TUNNEL SURG      Right wrist     INJECT EPIDURAL LUMBAR Right 12/27/2018    Procedure: INJECT EPIDURAL LUMBAR right foraminal narrowing severe at L4-L5 and moderate at L5-S1;  Surgeon: Gilbert Mcclure MD;  [facility]     INJECT EPIDURAL TRANSFORAMINAL Bilateral 5/27/2021    Procedure: Bilateral Lumbar 5-sacral 1 Epidural steroid injection;  Surgeon: Gilbert Mcclure MD;  [facility]     INJECT EPIDURAL TRANSFORAMINAL Right 6/17/2022    Procedure: Attempted right Lumbar 4-5 and Lumbar 5-Sacral 1 epidural injections with completion of only the right Lumbar 5-Sacral1 Transforaminal Epidural Steroid Injection with fluoroscopic guidance and contrast;  Surgeon: Gilbert Mcclure MD;  [facility]     INJECT JOINT SACROILIAC  4/10/2014    Procedure: INJECT JOINT SACROILIAC;  Sacroiliac Joint Injection;  Surgeon: Gilbert Mcclure MD;  [facility]     INJECT JOINT SACROILIAC Left 4/11/2019    Procedure: INJECT JOINT SACROILIAC- LEFT;  Surgeon: Gilbert Mcclure MD;  [facility]     LAPAROSCOPIC CHOLECYSTECTOMY N/A 3/24/2018    Procedure: LAPAROSCOPIC CHOLECYSTECTOMY;  Laparoscopic Cholecystectomy;  Surgeon: Berry Allen DO;  Location[facility]     Social History     Socioeconomic History     Marital status:      Spouse name: Not on file     Number of children: Not on file     Years of education: Not on file     Highest education level: Not on file   Occupational History     Employer: RETIRED     Comment: [employer] part time   Tobacco Use     Smoking status: Never     Smokeless tobacco: Never   Vaping Use     Vaping Use: Never used   Substance and Sexual Activity     Alcohol use: Yes     Drug use: No     Sexual activity: Not Currently   Other Topics Concern     Parent/sibling w/ CABG, MI or angioplasty before 65F 55M? Not Asked   Social  History Narrative     Not on file     Social Determinants of Health     Financial Resource Strain: Not on file   Food Insecurity: Not on file   Transportation Needs: Not on file   Physical Activity: Not on file   Stress: Not on file   Social Connections: Not on file   Intimate Partner Violence: Not on file   Housing Stability: Not on file     Family History   Problem Relation Age of Onset     Hypertension Brother      Cerebrovascular Disease Brother      Diabetes Father      Cardiovascular Father      Diabetes Brother         Borderline     Breast Cancer Mother      Diabetes Brother      Blood Disease Brother      Cardiovascular Brother         MI        ROS: 10 point ROS neg other than the symptoms noted above in the HPI.    Physical Exam  LMP  (LMP Unknown)   HEENT:  Normocephalic, atraumatic.  PERRLA.  EOM s intact.  Visual fields full to gross exam  Neck:  Supple, non-tender, without lymphadenopathy.  Heart:  No peripheral edema  Lungs:  No SOB  Abdomen:  Non-distended.   Skin:  Warm and dry.  Extremities:  No edema, cyanosis or clubbing.  Psychiatric:  No apparent distress  Musculoskeletal:  Normal bulk and tone    NEUROLOGICAL EXAMINATION:     Mental status:  Alert and Oriented x 3, speech is fluent.  Cranial nerves:  II-XII intact.   Motor:    Shoulder Abduction:  Right:  5/5   Left:  5/5  Biceps:                      Right:  5/5   Left:  5/5  Triceps:                     Right:  5/5   Left:  5/5  Wrist Extensors:       Right:  5/5   Left:  5/5  Wrist Flexors:           Right:  5/5   Left:  5/5  interosseus :            Right:  5/5   Left:  5/5  Hip Flexion:                Right: 5/5  Left:  5/5  Quadriceps:             Right:  5/5  Left:  5/5  Hamstrings:             Right:  5/5  Left:  5/5  Gastroc Soleus:        Right:  5/5  Left:  5/5  Tib/Ant:                      Right:  5/5  Left:  5/5  EHL:                     Right:  5/5  Left:  5/5  Sensation:  Intact  Reflexes:  Negative Babinski.  Negative Clonus.   Negative Clemente's.  Coordination:  Smooth finger to nose testing.   Negative pronator drift.  Smooth tandem walking.    A/P:  81F w/ lumbar scoliosis, stenosis, severe back pain    Will order Right SI joint injection  [facility] EMG  PT

## 2023-01-26 NOTE — LETTER
1/26/2023         RE: Mya Hui  [address]  [facility] [address]        Dear Colleague,    Thank you for referring your patient, Mya Hui, to the [facility] [place]. Please see a copy of my visit note below.    81F w/ lumbar scoliosis, stenosis, severe back pain.  Several years of aching, axial back pain.  Notes 7/10 throbbing back pain with radiation to the right thigh, worse with activity and walking.   Did PT and BLANCA in 2018 with excellent relief.  Scans show scoliosis with marked right-kelley curvature, stenosis, and listhesis.    Returns for follow up.  Continued severe right lower back and leg pain.  Underwent BLANCA this summer without improvement.  She saw Dr. Choe for eval who didn't note any orthopedic issues.  New MR Lumbar, personally reviewed, shows marked scoliosis, multiple levels of severe foraminal stenosis, and L3-4 moderate/severe central and lateral recess stenosis.    Past Medical History:   Diagnosis Date     Acute cholecystitis 3/23/2018     Acute kidney injury (H) 9/10/2017     Arthritis      Dehydration 9/4/2017     Diabetes type 2, controlled (H)      Elevated lactic acid level 9/10/2017     GERD (gastroesophageal reflux disease)      History of renal calculi      HTN, goal below 140/90      Hyperlipidaemia LDL goal < 100      Hypokalemia 9/3/2017     Hypothyroid      Insomnia      Left carotid stenosis      Migraine      Motion sickness      PONV (postoperative nausea and vomiting)      Sciatica of right side 6/28/2013     Type 2 diabetes mellitus without complication, without long-term current use of insulin (H) 2/16/2017     Past Surgical History:   Procedure Laterality Date     BUNIONECTOMY      Right foot     CATARACT IOL, RT/LT Bilateral 2017     COLONOSCOPY  7/12/2013    Procedure: COLONOSCOPY;  Colonoscopy;  Surgeon: Giovany Espinoza MD;  Location:  GI     COLONOSCOPY N/A 1/31/2019    Procedure: Combined  Colonoscopy, Single Or Multiple Biopsy/Polypectomy By Biopsy;  Surgeon: Efren Osorio MD;  Location: [facility] OR     COLONOSCOPY WITH CO2 INSUFFLATION N/A 1/31/2019    Procedure: COLONOSCOPY WITH CO2 INSUFFLATION;  Surgeon: Efren Osorio MD;  Location: [facility] OR     FRACTURE TX, ANKLE RT/LT      Left ankle     HC CYSTOURETHROSCOPY W/ URETEROSCOPY &/OR PYELOSCOPY; W/ LITHOTRIPSY       HC REVISE MEDIAN N/CARPAL TUNNEL SURG      Right wrist     INJECT EPIDURAL LUMBAR Right 12/27/2018    Procedure: INJECT EPIDURAL LUMBAR right foraminal narrowing severe at L4-L5 and moderate at L5-S1;  Surgeon: Gilbert Mcclure MD;  Location: [facility] OR     INJECT EPIDURAL TRANSFORAMINAL Bilateral 5/27/2021    Procedure: Bilateral Lumbar 5-sacral 1 Epidural steroid injection;  Surgeon: Gilbert Mcclure MD;  Location: [facility] OR     INJECT EPIDURAL TRANSFORAMINAL Right 6/17/2022    Procedure: Attempted right Lumbar 4-5 and Lumbar 5-Sacral 1 epidural injections with completion of only the right Lumbar 5-Sacral1 Transforaminal Epidural Steroid Injection with fluoroscopic guidance and contrast;  Surgeon: Glibert Mcclure MD;  Location: [facility] OR     INJECT JOINT SACROILIAC  4/10/2014    Procedure: INJECT JOINT SACROILIAC;  Sacroiliac Joint Injection;  Surgeon: Gilbert Mcclure MD;  Location: [facility] OR     INJECT JOINT SACROILIAC Left 4/11/2019    Procedure: INJECT JOINT SACROILIAC- LEFT;  Surgeon: Gilbert Mcclure MD;  Location: [facility] OR     LAPAROSCOPIC CHOLECYSTECTOMY N/A 3/24/2018    Procedure: LAPAROSCOPIC CHOLECYSTECTOMY;  Laparoscopic Cholecystectomy;  Surgeon: Berry Allen DO;  Location: [facility] OR     Social History     Socioeconomic History     Marital status:      Spouse name: Not on file     Number of children: Not on file     Years of education: Not on file     Highest education level: Not on file   Occupational History     Employer: RETIRED     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 Not Asked   Social History Narrative     Not on file     Social Determinants of Health     Financial Resource Strain: Not on file   Food Insecurity: Not on file   Transportation Needs: Not on file   Physical Activity: Not on file   Stress: Not on file   Social Connections: Not on file   Intimate Partner Violence: Not on file   Housing Stability: Not on file     Family History   Problem Relation Age of Onset     Hypertension Brother      Cerebrovascular Disease Brother      Diabetes Father      Cardiovascular Father      Diabetes Brother         Borderline     Breast Cancer Mother      Diabetes Brother      Blood Disease Brother      Cardiovascular Brother         MI        ROS: 10 point ROS neg other than the symptoms noted above in the HPI.    Physical Exam  LMP  (LMP Unknown)   HEENT:  Normocephalic, atraumatic.  PERRLA.  EOM s intact.  Visual fields full to gross exam  Neck:  Supple, non-tender, without lymphadenopathy.  Heart:  No peripheral edema  Lungs:  No SOB  Abdomen:  Non-distended.   Skin:  Warm and dry.  Extremities:  No edema, cyanosis or clubbing.  Psychiatric:  No apparent distress  Musculoskeletal:  Normal bulk and tone    NEUROLOGICAL EXAMINATION:     Mental status:  Alert and Oriented x 3, speech is fluent.  Cranial nerves:  II-XII intact.   Motor:    Shoulder Abduction:  Right:  5/5   Left:  5/5  Biceps:                      Right:  5/5   Left:  5/5  Triceps:                     Right:  5/5   Left:  5/5  Wrist Extensors:       Right:  5/5   Left:  5/5  Wrist Flexors:           Right:  5/5   Left:  5/5  interosseus :            Right:  5/5   Left:  5/5  Hip Flexion:                Right: 5/5  Left:  5/5  Quadriceps:             Right:  5/5   Left:  5/5  Hamstrings:             Right:  5/5  Left:  5/5  Gastroc Soleus:        Right:  5/5  Left:  5/5  Tib/Ant:                      Right:  5/5  Left:  5/5  EHL:                     Right:  5/5  Left:  5/5  Sensation:  Intact  Reflexes:  Negative Babinski.  Negative Clonus.  Negative Clemente's.  Coordination:  Smooth finger to nose testing.   Negative pronator drift.  Smooth tandem walking.    A/P:  81F w/ lumbar scoliosis, stenosis, severe back pain    Will order Right SI joint injection  RLE EMG  PT      Again, thank you for allowing me to participate in the care of your patient.        Sincerely,        Mumtaz Waite MD

## 2023-01-26 NOTE — PATIENT INSTRUCTIONS
Ordered a Right SI joint Injection at [facility]. [facility] will call you within 48 hours to schedule this. If you don't here from them, please schedule by calling Operating Room scheduling team s phone number: [phone], option 2. If symptoms continue 2 weeks after injections, call our clinic and talk to a nurse.    Orders for [facility]. They will call you to schedule within a few days. If you have not heard from them, please call [phone]. Please call our clinic if symptoms persist after your course of physical therapy (approximately 4 weeks).     Ordered a right leg EMG through the [facility]. They will call you to schedule. If you don't hear from their scheduling office within 2 business days, call [phone] to schedule. Scheduled 3 weeks after your injection.    Dr. Murillo's contact information: [phone]. This is for your grandsons. Chidi also sees patient in [place].    [facility]  [facility] - [address] [address]  Telephone:  [phone]       Fax:  [phone]

## 2023-01-26 NOTE — PROGRESS NOTES
Faxed EMG order to [facility] [phone]. Verified via RightFax.    Mansi Quinonez RN on 1/26/2023 at 1:20 PM

## 2023-01-31 ENCOUNTER — TELEPHONE (OUTPATIENT)
Dept: PALLIATIVE MEDICINE | Facility: CLINIC | Age: 83
End: 2023-01-31
Payer: COMMERCIAL

## 2023-01-31 DIAGNOSIS — E78.5 HYPERLIPIDEMIA WITH TARGET LDL LESS THAN 100: ICD-10-CM

## 2023-02-02 ENCOUNTER — THERAPY VISIT (OUTPATIENT)
Dept: PHYSICAL THERAPY | Facility: CLINIC | Age: 83
End: 2023-02-02
Attending: NEUROLOGICAL SURGERY
Payer: COMMERCIAL

## 2023-02-02 DIAGNOSIS — M54.41 CHRONIC RIGHT-SIDED LOW BACK PAIN WITH RIGHT-SIDED SCIATICA: ICD-10-CM

## 2023-02-02 DIAGNOSIS — M54.50 CHRONIC RIGHT-SIDED LOW BACK PAIN: ICD-10-CM

## 2023-02-02 DIAGNOSIS — G89.29 CHRONIC RIGHT-SIDED LOW BACK PAIN WITH RIGHT-SIDED SCIATICA: ICD-10-CM

## 2023-02-02 DIAGNOSIS — G89.29 CHRONIC RIGHT-SIDED LOW BACK PAIN: ICD-10-CM

## 2023-02-02 PROCEDURE — 97161 PT EVAL LOW COMPLEX 20 MIN: CPT | Mod: GP | Performed by: PHYSICAL THERAPIST

## 2023-02-02 PROCEDURE — 97112 NEUROMUSCULAR REEDUCATION: CPT | Mod: GP | Performed by: PHYSICAL THERAPIST

## 2023-02-02 RX ORDER — CHOLESTYRAMINE 4 G/9G
POWDER, FOR SUSPENSION ORAL
Qty: 60 PACKET | Refills: 5 | Status: SHIPPED | OUTPATIENT
Start: 2023-02-02 | End: 2023-05-02

## 2023-02-02 NOTE — TELEPHONE ENCOUNTER
Pending Prescriptions:                       Disp   Refills    cholestyramine (QUESTRAN) 4 g packet [Phar*60 pac*5        Sig: DISSOLVE AND TAKE ONE PACKET BY MOUTH TWICE A DAY           WITH MEALS      Routing refill request to provider for review/approval because:  Drug interaction warning    Dacia Serrato RN on 2/2/2023 at 12:58 PM

## 2023-02-02 NOTE — PROGRESS NOTES
[facility]    OUTPATIENT Physical Therapy ORTHOPEDIC EVALUATION  PLAN OF TREATMENT FOR OUTPATIENT REHABILITATION  (COMPLETE FOR INITIAL CLAIMS ONLY)  Patient's Last Name, First Name, M.I.  YOB: 1940  KorinaMya HE    Provider s Name:  [facility]   Medical Record No.  [id number]   Start of Care Date:  02/02/23   Onset Date:   01/26/23   Treatment Diagnosis:  Low back pain with R radiculopathy Medical Diagnosis:  Chronic right-sided low back pain with right-sided sciatica       Goals:     02/02/23 0500   Body Part   Goals listed below are for Low back pain with R radiculopathy   Goal #1   Goal #1 standing   Previous Functional Level Minutes patient could stand   Performance level 3 hours   Current Functional Level Minutes patient can stand   Performance level less than 1 hour pain 7/10   STG Target Performance Hours patient will be able to stand   Performance level 1 hour pain 5/10   Rationale for housekeeping tasks such as vacuuming, bed making, mowing, gardening;for personal hygiene;for meal preparation;for safe household ambulation;for safe community ambulation   Due date 02/27/23   LTG Target Performance Hours patient will be able to stand   Performance Level 2 hours pain 4/10   Rationale for housekeeping tasks such as vacuuming, bed making, mowing;for personal hygiene;for meal preparation;for safe household ambulation;for safe community ambulation   Due date 03/20/23         Therapy Frequency:  1x/week  Predicted Duration of Therapy Intervention:  10 weeks    Adam Zepeda, PT                 I CERTIFY THE NEED FOR THESE SERVICES FURNISHED UNDER        THIS PLAN OF TREATMENT AND WHILE UNDER MY CARE     (Physician co-signature of this document indicates review and certification of the therapy plan).                     Certification Date From:  02/02/23    Certification Date To:  04/13/23    Referring Provider:  Mumtaz Waite    Initial Assessment        See Epic Evaluation SOC Date: 02/02/23

## 2023-02-02 NOTE — PROGRESS NOTES
Physical Therapy Initial Evaluation  Subjective:  The history is provided by the patient. No  was used.   Patient Health History  Mya Hui being seen for back.     Problem began: 1/26/2023.   Problem occurred: unknown   Pain is reported as 7/10 on pain scale.  General health as reported by patient is fair.  Pertinent medical history includes: diabetes, high blood pressure, incontinence and overweight.   Red flags:  None as reported by patient.  Medical allergies: none.   Surgeries include:  None. Other surgery history details: broken ankle left foot.    Current medications:  High blood pressure medication, thyroid medication and pain medication.    Current occupation is retired.   Primary job tasks include:  Other.                  Therapist Generated HPI Evaluation  Problem details: Lots of back pain, pain moves from back down into R knee.     Fell on the ice 6 years ago, history of 3 falls total. Unable to get up off of floor. Ambulates using walker, owns [employer]..         Type of problem:  Lumbar.    This is a chronic condition.  Condition occurred with:  Insidious onset.  Where condition occurred: for unknown reasons.  Patient reports pain:  Lumbar spine right and other (into R knee).  Pain is described as sharp and is constant.  Pain radiates to:  Knee right. Pain is the same all the time.  Since onset symptoms are gradually improving.  Associated symptoms:  Incontinence. Symptoms are exacerbated by bending and standing  and relieved by other (patches, injections, lidocaine).  Special tests included:  X-ray and MRI.  Previous treatment includes other. There was none improvement following previous treatment.  Restrictions due to condition include:  Working in normal job without restrictions.  Barriers include:  Lives alone, bathroom/bedroom on second floor and stairs.                        Objective:  System         Lumbar/SI Evaluation  ROM:    AROM Lumbar:   Flexion:          Hand mid  "thigh  Ext:                    0   Side Bend:        Left:     Right:   Rotation:           Left:     Right:  Pain in right SI  Side Glide:        Left:  Pain in left SI    Right:                             SI joint/Sacrum:          Right positive at:    Forward bend standing                                                     General Evaluation:  AROM:        Lumbopelvic:  Significant findings: pain peripheralizes with flexion and improves with extension        Gross Strength:    General:  5xSTS: 37.01sec table height 22\"                              Posture:    Standing:   Rounded shoulders, forward head and lordosis decreased  Sitting:  Rounded shoulders, forward head and lordosis decreased  Functional Assessment:      Timed up and go:  35.73/sec      Gait:    Significant findings:  Ambulating with 4WW, forward rounded shoulders, short step length                                     ROS    Assessment/Plan:    Patient is a 82 year old female with lumbar complaints.    Patient has the following significant findings with corresponding treatment plan.                Diagnosis 1:  Low back pain with R radiculopathy  Pain -  hot/cold therapy, manual therapy, education and home program  Decreased ROM/flexibility - manual therapy and therapeutic exercise  Decreased joint mobility - manual therapy and therapeutic exercise  Decreased strength - therapeutic exercise, therapeutic activities and home program  Impaired balance - neuro re-education, gait training, therapeutic activities and home program  Impaired gait - gait training and assistive devices  Decreased function - therapeutic activities, home program and functional performance testing  Impaired posture - neuro re-education and therapeutic activities    Therapy Evaluation Codes:   1) History comprised of:   Personal factors that impact the plan of care:      Age, Cognition, Living environment, Past/current experiences and Time since onset of symptoms. "    Comorbidity factors that impact the plan of care are:      Diabetes, High blood pressure and Overweight.     Medications impacting care: High blood pressure, Pain and thyroid medication.  2) Examination of Body Systems comprised of:   Body structures and functions that impact the plan of care:      Lumbar spine.   Activity limitations that impact the plan of care are:      Bathing, Bending, Cooking, Driving, Dressing, Jumping, Lifting, Sitting, Squatting/kneeling, Stairs, Standing, Walking and Laying down.  3) Clinical presentation characteristics are:   Stable/Uncomplicated.  4) Decision-Making    Low complexity using standardized patient assessment instrument and/or measureable assessment of functional outcome.  Cumulative Therapy Evaluation is: Low complexity.    Previous and current functional limitations:  (See Goal Flow Sheet for this information)    Short term and Long term goals: (See Goal Flow Sheet for this information)     Communication ability:  Patient appears to be able to clearly communicate and understand verbal and written communication and follow directions correctly.  Treatment Explanation - The following has been discussed with the patient:   RX ordered/plan of care  Anticipated outcomes  Possible risks and side effects  This patient would benefit from PT intervention to resume normal activities.   Rehab potential is good.    Frequency:  1 X week, once daily  Duration:  for 10 weeks  Discharge Plan:  Achieve all LTG.  Independent in home treatment program.  Reach maximal therapeutic benefit.    Please refer to the daily flowsheet for treatment today, total treatment time and time spent performing 1:1 timed codes.     Connie Hubbard, SPT  Adam Zepeda, DPT, OCS

## 2023-02-09 ENCOUNTER — HOSPITAL ENCOUNTER (OUTPATIENT)
Facility: CLINIC | Age: 83
Discharge: STILL ADMITTED OR ADMINISTRATIVE | End: 2023-02-09
Attending: ANESTHESIOLOGY | Admitting: ANESTHESIOLOGY
Payer: COMMERCIAL

## 2023-02-09 ENCOUNTER — HOSPITAL ENCOUNTER (EMERGENCY)
Facility: CLINIC | Age: 83
Discharge: HOME | End: 2023-02-09
Attending: PHYSICIAN ASSISTANT | Admitting: PHYSICIAN ASSISTANT
Payer: COMMERCIAL

## 2023-02-09 ENCOUNTER — APPOINTMENT (OUTPATIENT)
Dept: GENERAL RADIOLOGY | Facility: CLINIC | Age: 83
End: 2023-02-09
Attending: PHYSICIAN ASSISTANT
Payer: COMMERCIAL

## 2023-02-09 ENCOUNTER — NURSE TRIAGE (OUTPATIENT)
Dept: FAMILY MEDICINE | Facility: OTHER | Age: 83
End: 2023-02-09
Payer: COMMERCIAL

## 2023-02-09 ENCOUNTER — HOSPITAL ENCOUNTER (OUTPATIENT)
Facility: CLINIC | Age: 83
End: 2023-02-09
Attending: ANESTHESIOLOGY | Admitting: ANESTHESIOLOGY
Payer: COMMERCIAL

## 2023-02-09 ENCOUNTER — APPOINTMENT (OUTPATIENT)
Dept: GENERAL RADIOLOGY | Facility: CLINIC | Age: 83
End: 2023-02-09
Attending: EMERGENCY MEDICINE
Payer: COMMERCIAL

## 2023-02-09 VITALS
TEMPERATURE: 97.9 F | OXYGEN SATURATION: 98 % | DIASTOLIC BLOOD PRESSURE: 74 MMHG | SYSTOLIC BLOOD PRESSURE: 147 MMHG | RESPIRATION RATE: 18 BRPM | WEIGHT: 210 LBS | BODY MASS INDEX: 35.77 KG/M2 | HEART RATE: 65 BPM

## 2023-02-09 DIAGNOSIS — S90.122A CONTUSION OF FIFTH TOE OF LEFT FOOT, INITIAL ENCOUNTER: ICD-10-CM

## 2023-02-09 DIAGNOSIS — S40.011A CONTUSION OF RIGHT SHOULDER, INITIAL ENCOUNTER: ICD-10-CM

## 2023-02-09 DIAGNOSIS — S89.91XA KNEE INJURY, RIGHT, INITIAL ENCOUNTER: ICD-10-CM
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
## 2023-02-09 PROCEDURE — 250N000013 HC RX MED GY IP 250 OP 250 PS 637: Performed by: PHYSICIAN ASSISTANT

## 2023-02-09 PROCEDURE — 73060 X-RAY EXAM OF HUMERUS: CPT | Mod: RT

## 2023-02-09 PROCEDURE — 99284 EMERGENCY DEPT VISIT MOD MDM: CPT | Performed by: PHYSICIAN ASSISTANT

## 2023-02-09 PROCEDURE — 73562 X-RAY EXAM OF KNEE 3: CPT | Mod: RT

## 2023-02-09 PROCEDURE — 99283 EMERGENCY DEPT VISIT LOW MDM: CPT | Performed by: PHYSICIAN ASSISTANT

## 2023-02-09 RX ORDER — IBUPROFEN 600 MG/1
600 TABLET, FILM COATED ORAL ONCE
Status: COMPLETED | OUTPATIENT
Start: 2023-02-09 | End: 2023-02-09

## 2023-02-09 RX ADMIN — IBUPROFEN 600 MG: 600 TABLET ORAL at 14:48

## 2023-02-09 ASSESSMENT — ACTIVITIES OF DAILY LIVING (ADL): ADLS_ACUITY_SCORE: 33

## 2023-02-09 NOTE — TELEPHONE ENCOUNTER
Is this a 2nd Level Triage? NO    SITUATION:   Fell    BACKGROUND:   1. The patient has an injection at 12.     Patient fell during the night and hurt her arm. Patient has a chair in her room she was sitting in. She got up and went to her bed. She feels her left knee gave out and fell on right side. Most likely the arm was underneath the patient.     The patient is unable to put any pressure on it.   Right arm, patient can move it but is unable to place pressure on it. Pain is in the upper arm.   Pain is rated at 8/10.       Denies tingling, numbness, chest pain, difficult breathing, swelling, rash, neck pain, brusing, or fever.     HOME TREATMENTS:  Rest    HEALTH HISTORY:  HX of torn rotator cuff.        PLAN:   Patient wants to go to the ER since she will be up at the hospital.         JOSE R PenaN, RN, PHN  [facility]  February 9, 2023    Reason for Disposition    SEVERE pain (e.g., excruciating, unable to do any normal activities)    Additional Information    Negative: Shock suspected (e.g., cold/pale/clammy skin, too weak to stand, low BP, rapid pulse)    Negative: Similar pain previously and it was from 'heart attack'    Negative: Similar pain previously from 'angina' and not relieved by nitroglycerin    Negative: Sounds like a life-threatening emergency to the triager    Negative: Followed an injury to arm    Negative: Chest pain    Negative: Wound looks infected    Negative: Elbow pain is main symptom    Negative: Hand or wrist pain is main symptom    Negative: Difficulty breathing or unusual sweating (e.g., sweating without exertion)    Negative: Chest pain lasting longer than 5 minutes    Negative: Age > 40 and no obvious cause for pain, pain still present even when not moving the arm    Negative: Fever and red area (or area very tender to touch)    Negative: Swollen joint and fever    Negative: Entire arm is swollen    Negative: Patient sounds very sick or weak to the  triager    Protocols used: ARM PAIN-A-OH

## 2023-02-09 NOTE — ED NOTES
Bed: ED19  Expected date:   Expected time:   Means of arrival:   Comments:  Mya Hui is in the room

## 2023-02-09 NOTE — ED TRIAGE NOTES
R upper arm pain severe 8/10 today, fall last night onto R arm.      Triage Assessment     Row Name 02/09/23 6943       Triage Assessment (Adult)    Airway WDL WDL       Respiratory WDL    Respiratory WDL WDL       Cardiac WDL    Cardiac WDL WDL               Render In Strict Bullet Format?: No Initiate Treatment: Econazole cream qd Detail Level: Zone

## 2023-02-09 NOTE — DISCHARGE INSTRUCTIONS
Your x-rays did not show any fractures today.  I think you have some bruising to your shoulder and knee.  Apply ice as needed and take ibuprofen or Tylenol for pain control.  Take ibuprofen with food to prevent upset stomach.  Follow-up with your clinic provider as needed for persistent symptoms.  If you do have any worsening symptoms please return to the emergency department.    Thank you for choosing [facility]'s Emergency Department. It was a pleasure taking care of you today. If you have any questions, please call [phone].    Teresa Ramírez PA-C

## 2023-02-09 NOTE — ED PROVIDER NOTES
History     Chief Complaint   Patient presents with     Fall     HPI  Mya Hui is a 82 year old female who presents to the emergency department after a fall complaining of right shoulder pain.  Patient reports last night she her left knee buckled and caused her to fall onto her right shoulder.  Since then she has had pain in her right upper arm.  Denies elbow or wrist pain.  Able to move hand okay.  She also notes pain to her left pinky toe and right medial knee.  She reports a history of right medial knee pain issues that she is seeing orthopedics for and this feels similar so she thinks she might have twisted her knee in the fall.  Her left little toe she reports that she has broken a lot and is not concerned about it today.  She takes ibuprofen or Tylenol as needed for pain.  She denies hitting her head in the fall.  Otherwise at her baseline health.        Allergies:  Allergies   Allergen Reactions     Codeine Nausea     Fentanyl Nausea and Vomiting     VERY sensitive to most narcotics if not all.       Problem List:    Patient Active Problem List    Diagnosis Date Noted     Chronic right-sided low back pain 02/02/2023     Priority: Medium     Morbid obesity (H) 08/14/2022     Priority: Medium     Impacted cerumen of right ear 01/22/2021     Priority: Medium     Balance problems 01/22/2021     Priority: Medium     Tinnitus, right 01/22/2021     Priority: Medium     Chronic venous insufficiency 11/30/2020     Priority: Medium     Diabetes mellitus with peripheral vascular disease (H) 01/20/2020     Priority: Medium     CKD (chronic kidney disease) stage 3, GFR 30-59 ml/min (H) 01/20/2020     Priority: Medium     Bilateral lower extremity edema 09/24/2019     Priority: Medium     Urinary incontinence, unspecified type 07/17/2019     Priority: Medium     S/P laparoscopic cholecystectomy 04/03/2018     Priority: Medium     Toe anomaly 04/03/2018     Priority: Medium     Cherry angioma 04/03/2018      Priority: Medium     Gastroesophageal reflux disease, esophagitis presence not specified 09/14/2017     Priority: Medium     IMO Regulatory Load OCT 2020       Sliding hiatal hernia 09/11/2017     Priority: Medium     Calculus of gallbladder without cholecystitis 09/11/2017     Priority: Medium     Rotator cuff arthropathy, right 08/28/2017     Priority: Medium     Chronic right shoulder pain 08/10/2017     Priority: Medium     Right shoulder pain, unspecified chronicity 08/10/2017     Priority: Medium     Dry mouth 08/10/2017     Priority: Medium     Primary osteoarthritis involving multiple joints 06/16/2017     Priority: Medium     Cataract, bilateral 03/30/2017     Priority: Medium     Type 2 diabetes mellitus without complication, without long-term current use of insulin (H) 02/16/2017     Priority: Medium     Hypothyroidism, unspecified type 02/14/2017     Priority: Medium     Insomnia, unspecified 02/14/2017     Priority: Medium     OAB (overactive bladder) 09/07/2016     Priority: Medium     Essential hypertension with goal blood pressure less than 140/90 09/07/2016     Priority: Medium     Essential hypertension 02/05/2016     Priority: Medium     Diabetes mellitus with stage 3 chronic kidney disease (H) 02/05/2016     Priority: Medium     Overview:   5/22/15: Creatinine at [place], [place] 1.17, GFR 45  5/22/15: hgb A1C 6.6, microalbumin 7.92       Migraine without aura and without status migrainosus, not intractable 11/19/2015     Priority: Medium     Gastroesophageal reflux disease without esophagitis 11/19/2015     Priority: Medium     Hyperlipidemia 11/19/2015     Priority: Medium     Overview:   5/22/15: , Trig 165, HDL 47, LDL 68  5/18/16: , Trig 218, HDL 46, LDL55       Hypothyroidism 11/19/2015     Priority: Medium     Overview:   5/22/15: TSH 2.68       Osteoarthritis of hip 08/21/2014     Priority: Medium     Do you wish to do the replacement in the background? yes         DDD  (degenerative disc disease), lumbar 08/21/2014     Priority: Medium     Hearing aid worn 05/30/2014     Priority: Medium     Right ear       Hip pain 09/27/2013     Priority: Medium     Sciatica of right side 06/28/2013     Priority: Medium     Asymptomatic carotid artery stenosis 05/02/2013     Priority: Medium     Hyperlipidemia with target LDL less than 100      Priority: Medium     Diagnosis updated by automated process. Provider to review and confirm.       History of renal calculi      Priority: Medium     Nevus 04/10/2013     Priority: Medium     Do you wish to do the replacement in the background? yes         Arthritis      Priority: Medium        Past Medical History:    Past Medical History:   Diagnosis Date     Acute cholecystitis 3/23/2018     Acute kidney injury (H) 9/10/2017     Arthritis      Dehydration 9/4/2017     Diabetes type 2, controlled (H)      Elevated lactic acid level 9/10/2017     GERD (gastroesophageal reflux disease)      History of renal calculi      HTN, goal below 140/90      Hyperlipidaemia LDL goal < 100      Hypokalemia 9/3/2017     Hypothyroid      Insomnia      Left carotid stenosis      Migraine      Motion sickness      PONV (postoperative nausea and vomiting)      Sciatica of right side 6/28/2013     Type 2 diabetes mellitus without complication, without long-term current use of insulin (H) 2/16/2017       Past Surgical History:    Past Surgical History:   Procedure Laterality Date     BUNIONECTOMY      Right foot     CATARACT IOL, RT/LT Bilateral 2017     COLONOSCOPY  7/12/2013    Procedure: COLONOSCOPY;  Colonoscopy;  Surgeon: Giovany Espinoza MD;  Location: [facility]     COLONOSCOPY N/A 1/31/2019    Procedure: Combined Colonoscopy, Single Or Multiple Biopsy/Polypectomy By Biopsy;  Surgeon: Efren Osorio MD;  Location: [facility]     COLONOSCOPY WITH CO2 INSUFFLATION N/A 1/31/2019    Procedure: COLONOSCOPY WITH CO2 INSUFFLATION;  Surgeon: Efren Osorio  MD Thomas;  Location: [facility]     FRACTURE TX, ANKLE RT/LT      Left ankle     HC CYSTOURETHROSCOPY W/ URETEROSCOPY &/OR PYELOSCOPY; W/ LITHOTRIPSY       HC REVISE MEDIAN N/CARPAL TUNNEL SURG      Right wrist     INJECT EPIDURAL LUMBAR Right 12/27/2018    Procedure: INJECT EPIDURAL LUMBAR right foraminal narrowing severe at L4-L5 and moderate at L5-S1;  Surgeon: Gilbert Mcclure MD;  Location: [facility]     INJECT EPIDURAL TRANSFORAMINAL Bilateral 5/27/2021    Procedure: Bilateral Lumbar 5-sacral 1 Epidural steroid injection;  Surgeon: Gilbert Mcclure MD;  Location: [facility]     INJECT EPIDURAL TRANSFORAMINAL Right 6/17/2022    Procedure: Attempted right Lumbar 4-5 and Lumbar 5-Sacral 1 epidural injections with completion of only the right Lumbar 5-Sacral1 Transforaminal Epidural Steroid Injection with fluoroscopic guidance and contrast;  Surgeon: Gilbert Mcclure MD;  Location: [facility]     INJECT JOINT SACROILIAC  4/10/2014    Procedure: INJECT JOINT SACROILIAC;  Sacroiliac Joint Injection;  Surgeon: Gilbert Mcclure MD;  [facility]     INJECT JOINT SACROILIAC Left 4/11/2019    Procedure: INJECT JOINT SACROILIAC- LEFT;  Surgeon: Gilbert Mcclure MD;  [facility]     LAPAROSCOPIC CHOLECYSTECTOMY N/A 3/24/2018    Procedure: LAPAROSCOPIC CHOLECYSTECTOMY;  Laparoscopic Cholecystectomy;  Surgeon: Berry Allen DO;  Location: [facility]       Family History:    Family History   Problem Relation Age of Onset     Hypertension Brother      Cerebrovascular Disease Brother      Diabetes Father      Cardiovascular Father      Diabetes Brother         Borderline     Breast Cancer Mother      Diabetes Brother      Blood Disease Brother      Cardiovascular Brother         MI       Social History:  Marital Status:   [2]  Social History     Tobacco Use     Smoking status: Never     Smokeless tobacco: Never   Vaping Use     Vaping Use: Never used   Substance Use Topics     Alcohol use: Yes     Drug use: No        Medications:     cholestyramine (QUESTRAN) 4 g packet  lisinopril (ZESTRIL) 40 MG tablet  ACCU-CHEK GUIDE test strip  blood glucose (ACCU-CHEK COMPACT PLUS) test strip  blood glucose monitoring (ACCU-CHEK FASTCLIX) lancets  blood glucose monitoring (NO BRAND SPECIFIED) meter device kit  diltiazem ER COATED BEADS (CARDIZEM CD/CARTIA XT) 120 MG 24 hr capsule  Incontinence Supply Disposable (PROTECTIVE UNDERWEAR SUPER LG) MISC  levothyroxine (SYNTHROID/LEVOTHROID) 75 MCG tablet  Multiple Vitamins-Minerals (MULTIVITAL PO)  order for DME  oxybutynin ER (DITROPAN XL) 10 MG 24 hr tablet  pantoprazole (PROTONIX) 40 MG EC tablet  polyethylene glycol (MIRALAX) 17 g packet  potassium chloride ER (K-TAB/KLOR-CON) 10 MEQ CR tablet  pravastatin (PRAVACHOL) 40 MG tablet          Review of Systems   All other systems reviewed and are negative.        Physical Exam   BP: (!) 147/74  Pulse: 65  Temp: 97.9  F (36.6  C)  Resp: 18  Weight: 95.3 kg (210 lb)  SpO2: 98 %      Physical Exam  Vitals and nursing note reviewed.   Constitutional:       General: She is not in acute distress.     Appearance: Normal appearance. She is not ill-appearing, toxic-appearing or diaphoretic.   HENT:      Head: Normocephalic and atraumatic.      Nose: Nose normal.   Eyes:      Extraocular Movements: Extraocular movements intact.      Conjunctiva/sclera: Conjunctivae normal.   Cardiovascular:      Pulses: Normal pulses.   Pulmonary:      Effort: Pulmonary effort is normal. No respiratory distress.   Musculoskeletal:      Cervical back: Neck supple.      Comments: Right arm: Tenderness to the lateral aspect of the proximal third of the upper arm soft tissues.  No palpable bony deformity to area, no overlying skin changes.  No tenderness to the bony prominences in the shoulder, clavicle, or scapula.  No tenderness to elbow, forearm, or wrist.  Normal  strength.  Normal radial pulse.  Right knee: Mild swelling to medial aspect with associated tenderness.  No palpable  bony deformities noted.  Patient able to flex and extend knee though it is painful.  Left little toe: Ecchymosis present, tenderness throughout.  No palpable bony deformity.   Skin:     General: Skin is warm and dry.   Neurological:      General: No focal deficit present.      Mental Status: She is alert and oriented to person, place, and time.   Psychiatric:         Mood and Affect: Mood normal.         Behavior: Behavior normal.           ED Course           Procedures        Results for orders placed or performed during the hospital encounter of 02/09/23 (from the past 24 hour(s))   Humerus XR, G/E 2 views, right    Narrative    HUMERUS TWO VIEWS RIGHT  2/9/2023 1:27 PM     HISTORY: Fall R upper arm pain  COMPARISON: None.      Impression    IMPRESSION: No acute fracture or malalignment. Moderate to severe  glenohumeral and acromioclavicular joint degenerative changes.  Osteopenia.    MAYRA DE LA O MD         SYSTEM ID:  [id number]   XR Knee Right 3 Views    Narrative    KNEE THREE VIEWS RIGHT  2/9/2023 2:17 PM     HISTORY: fall, pain to medial knee  COMPARISON: 8/31/2022      Impression    IMPRESSION: No acute fracture or malalignment. Mild patellofemoral  compartment degenerative changes. Small knee joint effusion.  Osteopenia.    MAYRA DE LA O MD         SYSTEM ID:  [id number]       Medications   ibuprofen (ADVIL/MOTRIN) tablet 600 mg (600 mg Oral Given 2/9/23 1448)          Assessments & Plan (with Medical Decision Making)  Mya Hui is a 82 year old female who presented to the ED complaining of right shoulder pain after falling last night.  Also notes pain in the right knee and left little toe.  On arrival to the ED she was mildly hypertensive but otherwise had normal vital signs.  Exam today revealed tenderness to the lateral soft tissues of the right upper arm.  No discrete bony tenderness in the shoulder, upper arm, elbow, forearm, or wrist.  Neurovascularly intact in the arm.  Mildly  limited range of motion secondary to pain.  She also had tenderness with swelling to the medial aspect of the right knee.  Left little toe contused and tender.  Patient given ibuprofen here for pain.  She refused x-ray of the little toe, stating she will bobby tape it if it bothers her.  X-rays of the right upper arm however and right knee were obtained to rule out acute bony abnormalities.  Humerus x-ray was negative for acute fracture.  Knee x-ray showed a small joint effusion but no acute fractures otherwise.  Results were discussed with the patient and her daughter.  Suspect contusions to shoulder and knee.  Could have left toe fracture but without x-ray cannot confirm.  I discussed pain management options with her, she did not want any sedating narcotic medications but was comfortable with use of ibuprofen and Tylenol.  Encouraged ibuprofen to be taken with food to prevent upset stomach.  She was encouraged to ice affected areas and should practice gentle stretching as well.  Patient has seen orthopedics for her knee in the past, plans to reach out again for follow-up for her knee and shoulder which I think is reasonable.  He was provided instructions on when to return to the ED.  All questions answered and patient discharged home in stable condition.     I have reviewed the nursing notes.    I have reviewed the findings, diagnosis, plan and need for follow up with the patient.      Discharge Medication List as of 2/9/2023  2:49 PM          Final diagnoses:   Contusion of right shoulder, initial encounter   Knee injury, right, initial encounter   Contusion of fifth toe of left foot, initial encounter   Fall, initial encounter     Note: Chart documentation done in part with Dragon Voice Recognition software. Although reviewed after completion, some word and grammatical errors may remain.     2/9/2023   [facility] EMERGENCY DEPT     Teresa Ramírez PA-C  02/09/23 [id number]

## 2023-02-20 DIAGNOSIS — K21.9 GASTROESOPHAGEAL REFLUX DISEASE: ICD-10-CM

## 2023-02-20 DIAGNOSIS — E78.5 HYPERLIPIDEMIA WITH TARGET LDL LESS THAN 100: ICD-10-CM

## 2023-02-21 ENCOUNTER — DOCUMENTATION ONLY (OUTPATIENT)
Dept: EMERGENCY MEDICINE | Facility: CLINIC | Age: 83
End: 2023-02-21
Payer: COMMERCIAL

## 2023-02-21 DIAGNOSIS — S40.011A CONTUSION OF RIGHT SHOULDER, INITIAL ENCOUNTER: Primary | ICD-10-CM

## 2023-02-21 DIAGNOSIS — Z53.9 ERRONEOUS ENCOUNTER--DISREGARD: ICD-10-CM

## 2023-02-22 RX ORDER — PRAVASTATIN SODIUM 40 MG
TABLET ORAL
Qty: 90 TABLET | Refills: 0 | Status: SHIPPED | OUTPATIENT
Start: 2023-02-22 | End: 2023-05-02

## 2023-02-22 RX ORDER — PANTOPRAZOLE SODIUM 40 MG/1
TABLET, DELAYED RELEASE ORAL
Qty: 180 TABLET | Refills: 0 | Status: SHIPPED | OUTPATIENT
Start: 2023-02-22 | End: 2023-05-02

## 2023-03-03 ENCOUNTER — HOSPITAL ENCOUNTER (OUTPATIENT)
Facility: CLINIC | Age: 83
Discharge: HOME | End: 2023-03-03
Attending: ANESTHESIOLOGY | Admitting: ANESTHESIOLOGY
Payer: COMMERCIAL

## 2023-03-03 ENCOUNTER — HOSPITAL ENCOUNTER (OUTPATIENT)
Dept: GENERAL RADIOLOGY | Facility: CLINIC | Age: 83
Discharge: HOME | End: 2023-03-03
Attending: ANESTHESIOLOGY | Admitting: ANESTHESIOLOGY
Payer: COMMERCIAL

## 2023-03-03 VITALS
DIASTOLIC BLOOD PRESSURE: 89 MMHG | BODY MASS INDEX: 35.85 KG/M2 | HEART RATE: 70 BPM | TEMPERATURE: 98.5 F | OXYGEN SATURATION: 94 % | RESPIRATION RATE: 15 BRPM | WEIGHT: 210 LBS | SYSTOLIC BLOOD PRESSURE: 158 MMHG | HEIGHT: 64 IN

## 2023-03-03 PROCEDURE — 250N000011 HC RX IP 250 OP 636: Performed by: ANESTHESIOLOGY

## 2023-03-03 PROCEDURE — 250N000009 HC RX 250: Performed by: ANESTHESIOLOGY

## 2023-03-03 PROCEDURE — 27096 INJECT SACROILIAC JOINT: CPT | Performed by: ANESTHESIOLOGY

## 2023-03-03 PROCEDURE — 999N000179 XR SURGERY CARM FLUORO LESS THAN 5 MIN W STILLS: Mod: TC

## 2023-03-03 PROCEDURE — 27096 INJECT SACROILIAC JOINT: CPT | Mod: RT | Performed by: ANESTHESIOLOGY

## 2023-03-03 RX ORDER — LIDOCAINE HYDROCHLORIDE 40 MG/ML
INJECTION, SOLUTION RETROBULBAR PRN
Status: DISCONTINUED | OUTPATIENT
Start: 2023-03-03 | End: 2023-03-03 | Stop reason: HOSPADM

## 2023-03-03 RX ORDER — ACETAMINOPHEN 500 MG
1000 TABLET ORAL EVERY 6 HOURS PRN
COMMUNITY
End: 2024-05-22

## 2023-03-03 RX ORDER — TRIAMCINOLONE ACETONIDE 40 MG/ML
INJECTION, SUSPENSION INTRA-ARTICULAR; INTRAMUSCULAR PRN
Status: DISCONTINUED | OUTPATIENT
Start: 2023-03-03 | End: 2023-03-03 | Stop reason: HOSPADM

## 2023-03-03 RX ORDER — IOPAMIDOL 612 MG/ML
INJECTION, SOLUTION INTRATHECAL PRN
Status: DISCONTINUED | OUTPATIENT
Start: 2023-03-03 | End: 2023-03-03 | Stop reason: HOSPADM

## 2023-03-03 ASSESSMENT — ACTIVITIES OF DAILY LIVING (ADL): ADLS_ACUITY_SCORE: 35

## 2023-03-03 NOTE — OP NOTE
CHIEF COMPLAINT:    1.Sacroiliac mediated joint pain (Sacroilitis 724.6)     INDICATIONS FOR PROCEDURE:  1.This patient suffers from moderate to severe back pain and right buttock pains  2.This pain has persisted for more than 4 weeks and is causing significant functional disability when they are trying to perform ADL's.   3. They failed conservative care which consisted of giving this pain time to mark, previous injections, and in the past she has tried physical therapy.  4. Preoperative NRS pain score was verbally reported to me today as a  7/10.   5. The patients radicular pains correlates to their MRI which shows severe central stenosis at L3-4 as well as severe facet hypertrophy.  6.  An order was sent to me to perform the technical component of a lumbar epidural steroid injection.    PROCEDURE:   Fluoroscopically-guided injection into the Right  Sacroiliac joint.    PROCEDURE DETAILS: After written informed consent was obtained from the patient, the patient was escorted to the procedure room.  The patient was placed in the prone position.   A time out was conducted to verify patient identity, procedure to be performed, side, site, allergies and any special requirements.  The skin over the lumbosacral region was prepped and draped in normal sterile fashion with ChloraPrep. Fluoroscopy was used to identify the posteroinferior region of the sacroiliac joint.  The skin was anesthetized with 2 mL of 1% lidocaine with bicarbonate buffer.  Using fluoroscopic guidance, a 22 gauge, 3.5  Quincke spinal needle was advanced into the joint from an inferior approach.  After negative aspiration, 0.5 ml of Omnipaque contrast was injected showing intra-articular spread of contrast without evidence of intravascular spread.  2.0 mL of solution consisting of 40 mg of Triamcinolone and 1 cc of 2.0% Lidocaine was injected slowly into the joint.  The dispersion of the injected medication went to the lower half of the joint but was  restricted to the lower half of the joint.  The patient was monitored with blood pressure and pulse oximetry machines with the assistance of an RN throughout the procedure.  The patient was alert and responsive to questions throughout the procedure.   The patient tolerated the procedure well and was observed in the post-procedural area.  The patient was dismissed without apparent complications.     DIAGNOSIS:  1.  Right sacroilitis  2.  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 Performed a right Sacroiliac joint injection.  2.. The patient was instructed to fill out a pain form reflecting the local anesthetic phase of the injection and follow-up per Dr. Mcclure's instructions.   3. Home exercise program instructions for SI joint mediated pain provided  4. NRS pain score 30 minutes post-procedure was a 5/10.  Based upon the postoperative pain score it appears that this is not diagnostic.  Nonetheless I still will have her fill out a pain diary to rate her pain for the next 2 hours.  We did use lidocaine sore really only looking at the first hour after the injection.    Gilbert Mcclure MD  Diplomate of the American Board of Anesthesiology, Pain Medicine

## 2023-03-03 NOTE — DISCHARGE INSTRUCTIONS

## 2023-03-08 ENCOUNTER — THERAPY VISIT (OUTPATIENT)
Dept: PHYSICAL THERAPY | Facility: CLINIC | Age: 83
End: 2023-03-08
Payer: COMMERCIAL

## 2023-03-08 DIAGNOSIS — G89.29 CHRONIC RIGHT-SIDED LOW BACK PAIN: Primary | ICD-10-CM

## 2023-03-08 DIAGNOSIS — M54.50 CHRONIC RIGHT-SIDED LOW BACK PAIN: Primary | ICD-10-CM

## 2023-03-08 PROCEDURE — 97110 THERAPEUTIC EXERCISES: CPT | Mod: GP | Performed by: PHYSICAL THERAPY ASSISTANT

## 2023-03-08 PROCEDURE — 97112 NEUROMUSCULAR REEDUCATION: CPT | Mod: GP | Performed by: PHYSICAL THERAPY ASSISTANT

## 2023-03-09 ENCOUNTER — DOCUMENTATION ONLY (OUTPATIENT)
Dept: PALLIATIVE MEDICINE | Facility: CLINIC | Age: 83
End: 2023-03-09
Payer: COMMERCIAL

## 2023-03-09 NOTE — PROGRESS NOTES
Her pain form from her diagnostic SI oint injection appears equivocal. I recommend she continues with PT and if still not making any progress another visit with Spine surgery and if they feel no surgical indications then medical management of chronic pain.

## 2023-03-13 DIAGNOSIS — N32.81 OAB (OVERACTIVE BLADDER): ICD-10-CM

## 2023-03-17 ENCOUNTER — THERAPY VISIT (OUTPATIENT)
Dept: PHYSICAL THERAPY | Facility: CLINIC | Age: 83
End: 2023-03-17
Payer: COMMERCIAL

## 2023-03-17 DIAGNOSIS — M54.50 CHRONIC RIGHT-SIDED LOW BACK PAIN: Primary | ICD-10-CM

## 2023-03-17 DIAGNOSIS — G89.29 CHRONIC RIGHT-SIDED LOW BACK PAIN: Primary | ICD-10-CM

## 2023-03-17 PROCEDURE — 97110 THERAPEUTIC EXERCISES: CPT | Mod: GP | Performed by: PHYSICAL THERAPY ASSISTANT

## 2023-03-17 PROCEDURE — 97112 NEUROMUSCULAR REEDUCATION: CPT | Mod: GP | Performed by: PHYSICAL THERAPY ASSISTANT

## 2023-03-17 RX ORDER — OXYBUTYNIN CHLORIDE 10 MG/1
TABLET, EXTENDED RELEASE ORAL
Qty: 90 TABLET | Refills: 0 | Status: SHIPPED | OUTPATIENT
Start: 2023-03-17 | End: 2023-05-02

## 2023-03-22 ENCOUNTER — THERAPY VISIT (OUTPATIENT)
Dept: PHYSICAL THERAPY | Facility: CLINIC | Age: 83
End: 2023-03-22
Payer: COMMERCIAL

## 2023-03-22 DIAGNOSIS — G89.29 CHRONIC RIGHT-SIDED LOW BACK PAIN: Primary | ICD-10-CM

## 2023-03-22 DIAGNOSIS — M54.50 CHRONIC RIGHT-SIDED LOW BACK PAIN: Primary | ICD-10-CM

## 2023-03-22 PROCEDURE — 97112 NEUROMUSCULAR REEDUCATION: CPT | Mod: GP | Performed by: PHYSICAL THERAPY ASSISTANT

## 2023-03-22 PROCEDURE — 97110 THERAPEUTIC EXERCISES: CPT | Mod: GP | Performed by: PHYSICAL THERAPY ASSISTANT

## 2023-04-04 DIAGNOSIS — I10 ESSENTIAL HYPERTENSION WITH GOAL BLOOD PRESSURE LESS THAN 140/90: ICD-10-CM

## 2023-04-04 RX ORDER — DILTIAZEM HYDROCHLORIDE 120 MG/1
120 CAPSULE, COATED, EXTENDED RELEASE ORAL 2 TIMES DAILY
Qty: 180 CAPSULE | Refills: 3 | OUTPATIENT
Start: 2023-04-04

## 2023-04-04 NOTE — TELEPHONE ENCOUNTER
Was sent on 1/3/23 with refills, should not be out at this time, please check profile for any held scripts.

## 2023-04-07 ENCOUNTER — THERAPY VISIT (OUTPATIENT)
Dept: PHYSICAL THERAPY | Facility: CLINIC | Age: 83
End: 2023-04-07
Payer: COMMERCIAL

## 2023-04-07 DIAGNOSIS — M54.50 CHRONIC RIGHT-SIDED LOW BACK PAIN: Primary | ICD-10-CM

## 2023-04-07 DIAGNOSIS — G89.29 CHRONIC RIGHT-SIDED LOW BACK PAIN: Primary | ICD-10-CM

## 2023-04-07 PROCEDURE — 97110 THERAPEUTIC EXERCISES: CPT | Mod: GP | Performed by: PHYSICAL THERAPY ASSISTANT

## 2023-04-07 NOTE — PROGRESS NOTES
"DISCHARGE REPORT    Progress reporting period is from 2/2/23 to 4/7/23.      SUBJECTIVE  Subjective changes noted by patient:    Pt has been seen for 5 PT sessions, pt pleased with benefit of establishing a HEP and daily walking program. Intermittent pain is located in sacral area with radiation to R LE. Symptoms increase with prolonged standing. Pt will have EMG later this month for further assessment.   Current Pain level: 0/10, intermittent up to 6-8/10    Initial Pain level: 7/10   Changes in function:  Yes (See Goal flowsheet attached for changes in current functional level)     Adverse reaction to treatment or activity: None     OBJECTIVE  Changes noted in objective findings:    LROM: flex reach to just above knee with increased LBP, ext mod loss.   MMT B LE: hip flex 4+/5, abd 4-/5, knee ext 5-/5, knee flex 5/5.   Gait with 4 wheeled walker, cues for upright posture.   Sit<>stand from 22\" height x 5 reps has improved from 37 to 20 seconds.       "

## 2023-04-10 NOTE — PROGRESS NOTES
ASSESSMENT/PLAN  Updated problem list and treatment plan: Diagnosis 1:  Chronic R LBP  Pain -  self management and home program  Decreased ROM/flexibility - home program  Decreased strength - home program  Decreased function - home program  Progress toward STG/LTGs have been made:  Yes (See Goal flow sheet completed today.)  Assessment of Progress: The patient's condition is improving.  Patient is meeting short term goals and is progressing towards long term goals.  Self Management Plans:  Patient is independent in a home treatment program.  Patient is independent in self management of symptoms.  I have re-evaluated this patient and find that the nature, scope, duration and intensity of the therapy is appropriate for the medical condition of the patient.  Mya continues to require the following intervention to meet STG and LT's:  PT intervention is no longer required to meet STG/LTG.    Recommendations:  This patient is ready to be discharged from therapy and continue their home treatment program.  The progress note/discharge summary was written in collaboration with and reviewed by the physical therapist.    Please refer to the daily flowsheet for treatment today, total treatment time and time spent performing 1:1 timed codes.

## 2023-04-13 DIAGNOSIS — I10 ESSENTIAL HYPERTENSION WITH GOAL BLOOD PRESSURE LESS THAN 140/90: ICD-10-CM

## 2023-04-14 RX ORDER — LISINOPRIL 40 MG/1
TABLET ORAL
Qty: 90 TABLET | Refills: 1 | Status: SHIPPED | OUTPATIENT
Start: 2023-04-14 | End: 2023-05-02

## 2023-04-14 NOTE — TELEPHONE ENCOUNTER
Pending Prescriptions:                       Disp   Refills    lisinopril (ZESTRIL) 40 MG tablet [Pharmac*90 tab*1        Sig: TAKE ONE TABLET BY MOUTH ONCE DAILY    Routing refill request to provider for review/approval because:  Labs not current:    BP Readings from Last 3 Encounters:   03/03/23 (!) 158/89   02/09/23 (!) 147/74   12/08/22 (!) 173/72     Appointments in Next Year      May 02, 2023 10:30 AM  (Arrive by 10:10 AM)  Provider Visit with Kisha Stinson PA-C  [facility] ([facility] [place] ) [phone]

## 2023-04-17 DIAGNOSIS — E03.9 HYPOTHYROIDISM, UNSPECIFIED TYPE: ICD-10-CM

## 2023-04-19 RX ORDER — LEVOTHYROXINE SODIUM 75 UG/1
TABLET ORAL
Qty: 90 TABLET | Refills: 0 | Status: SHIPPED | OUTPATIENT
Start: 2023-04-19 | End: 2023-05-02

## 2023-05-02 ENCOUNTER — OFFICE VISIT (OUTPATIENT)
Dept: FAMILY MEDICINE | Facility: OTHER | Age: 83
End: 2023-05-02
Payer: COMMERCIAL

## 2023-05-02 VITALS
RESPIRATION RATE: 18 BRPM | TEMPERATURE: 97.7 F | SYSTOLIC BLOOD PRESSURE: 122 MMHG | BODY MASS INDEX: 35.26 KG/M2 | HEART RATE: 64 BPM | DIASTOLIC BLOOD PRESSURE: 54 MMHG | OXYGEN SATURATION: 95 % | WEIGHT: 206.5 LBS | HEIGHT: 64 IN

## 2023-05-02 DIAGNOSIS — K21.9 GASTROESOPHAGEAL REFLUX DISEASE WITHOUT ESOPHAGITIS: ICD-10-CM

## 2023-05-02 DIAGNOSIS — Z00.00 ENCOUNTER FOR MEDICARE ANNUAL WELLNESS EXAM: Primary | ICD-10-CM

## 2023-05-02 DIAGNOSIS — E03.9 HYPOTHYROIDISM, UNSPECIFIED TYPE: ICD-10-CM

## 2023-05-02 DIAGNOSIS — H61.21 IMPACTED CERUMEN OF RIGHT EAR: ICD-10-CM

## 2023-05-02 DIAGNOSIS — E66.01 MORBID OBESITY (H): ICD-10-CM

## 2023-05-02 DIAGNOSIS — E11.22 DIABETES MELLITUS WITH STAGE 3 CHRONIC KIDNEY DISEASE (H): ICD-10-CM

## 2023-05-02 DIAGNOSIS — I10 ESSENTIAL HYPERTENSION WITH GOAL BLOOD PRESSURE LESS THAN 140/90: ICD-10-CM

## 2023-05-02 DIAGNOSIS — N32.81 OAB (OVERACTIVE BLADDER): ICD-10-CM

## 2023-05-02 DIAGNOSIS — E87.6 HYPOKALEMIA: ICD-10-CM

## 2023-05-02 DIAGNOSIS — N18.30 DIABETES MELLITUS WITH STAGE 3 CHRONIC KIDNEY DISEASE (H): ICD-10-CM

## 2023-05-02 DIAGNOSIS — E11.51 DIABETES MELLITUS WITH PERIPHERAL VASCULAR DISEASE (H): ICD-10-CM

## 2023-05-02 DIAGNOSIS — E78.5 HYPERLIPIDEMIA WITH TARGET LDL LESS THAN 100: ICD-10-CM

## 2023-05-02 LAB
ALBUMIN SERPL BCG-MCNC: 4.3 G/DL (ref 3.5–5.2)
ALP SERPL-CCNC: 71 U/L (ref 35–104)
ALT SERPL W P-5'-P-CCNC: 28 U/L (ref 10–35)
ANION GAP SERPL CALCULATED.3IONS-SCNC: 10 MMOL/L (ref 7–15)
AST SERPL W P-5'-P-CCNC: 30 U/L (ref 10–35)
BILIRUB SERPL-MCNC: 0.3 MG/DL
BUN SERPL-MCNC: 13.2 MG/DL (ref 8–23)
CALCIUM SERPL-MCNC: 9.6 MG/DL (ref 8.8–10.2)
CHLORIDE SERPL-SCNC: 108 MMOL/L (ref 98–107)
CHOLEST SERPL-MCNC: 162 MG/DL
CREAT SERPL-MCNC: 1.14 MG/DL (ref 0.51–0.95)
CREAT UR-MCNC: 483.3 MG/DL
DEPRECATED HCO3 PLAS-SCNC: 26 MMOL/L (ref 22–29)
GFR SERPL CREATININE-BSD FRML MDRD: 48 ML/MIN/1.73M2
GLUCOSE SERPL-MCNC: 121 MG/DL (ref 70–99)
HBA1C MFR BLD: 6.3 % (ref 0–5.6)
HDLC SERPL-MCNC: 44 MG/DL
HGB BLD-MCNC: 13.7 G/DL (ref 11.7–15.7)
LDLC SERPL CALC-MCNC: 69 MG/DL
MICROALBUMIN UR-MCNC: 26.8 MG/L
MICROALBUMIN/CREAT UR: 5.55 MG/G CR (ref 0–25)
NONHDLC SERPL-MCNC: 118 MG/DL
POTASSIUM SERPL-SCNC: 4.5 MMOL/L (ref 3.4–5.3)
PROT SERPL-MCNC: 6.9 G/DL (ref 6.4–8.3)
SODIUM SERPL-SCNC: 144 MMOL/L (ref 136–145)
T4 FREE SERPL-MCNC: 1.2 NG/DL (ref 0.9–1.7)
TRIGL SERPL-MCNC: 245 MG/DL
TSH SERPL DL<=0.005 MIU/L-ACNC: 4.69 UIU/ML (ref 0.3–4.2)

## 2023-05-02 PROCEDURE — 80053 COMPREHEN METABOLIC PANEL: CPT | Performed by: PHYSICIAN ASSISTANT

## 2023-05-02 PROCEDURE — 85018 HEMOGLOBIN: CPT | Performed by: PHYSICIAN ASSISTANT

## 2023-05-02 PROCEDURE — 84439 ASSAY OF FREE THYROXINE: CPT | Performed by: PHYSICIAN ASSISTANT

## 2023-05-02 PROCEDURE — 82570 ASSAY OF URINE CREATININE: CPT | Performed by: PHYSICIAN ASSISTANT

## 2023-05-02 PROCEDURE — 80061 LIPID PANEL: CPT | Performed by: PHYSICIAN ASSISTANT

## 2023-05-02 PROCEDURE — 99214 OFFICE O/P EST MOD 30 MIN: CPT | Mod: 25 | Performed by: PHYSICIAN ASSISTANT

## 2023-05-02 PROCEDURE — 36415 COLL VENOUS BLD VENIPUNCTURE: CPT | Performed by: PHYSICIAN ASSISTANT

## 2023-05-02 PROCEDURE — G0438 PPPS, INITIAL VISIT: HCPCS | Performed by: PHYSICIAN ASSISTANT

## 2023-05-02 PROCEDURE — 69210 REMOVE IMPACTED EAR WAX UNI: CPT | Mod: RT | Performed by: PHYSICIAN ASSISTANT

## 2023-05-02 PROCEDURE — 82043 UR ALBUMIN QUANTITATIVE: CPT | Performed by: PHYSICIAN ASSISTANT

## 2023-05-02 PROCEDURE — 83036 HEMOGLOBIN GLYCOSYLATED A1C: CPT | Performed by: PHYSICIAN ASSISTANT

## 2023-05-02 PROCEDURE — 84443 ASSAY THYROID STIM HORMONE: CPT | Performed by: PHYSICIAN ASSISTANT

## 2023-05-02 RX ORDER — CHOLESTYRAMINE 4 G/9G
1 POWDER, FOR SUSPENSION ORAL 2 TIMES DAILY WITH MEALS
Qty: 60 PACKET | Refills: 5 | Status: ON HOLD | OUTPATIENT
Start: 2023-05-02 | End: 2023-10-30

## 2023-05-02 RX ORDER — PRAVASTATIN SODIUM 40 MG
40 TABLET ORAL DAILY
Qty: 90 TABLET | Refills: 3 | Status: SHIPPED | OUTPATIENT
Start: 2023-05-02 | End: 2024-01-16

## 2023-05-02 RX ORDER — LISINOPRIL 40 MG/1
40 TABLET ORAL DAILY
Qty: 90 TABLET | Refills: 3 | Status: ON HOLD | OUTPATIENT
Start: 2023-05-02 | End: 2023-12-01

## 2023-05-02 RX ORDER — BLOOD SUGAR DIAGNOSTIC
STRIP MISCELLANEOUS
Qty: 100 STRIP | Refills: 11 | Status: SHIPPED | OUTPATIENT
Start: 2023-05-02

## 2023-05-02 RX ORDER — POTASSIUM CHLORIDE 750 MG/1
10 TABLET, EXTENDED RELEASE ORAL 3 TIMES DAILY
Qty: 270 TABLET | Refills: 3 | Status: ON HOLD | OUTPATIENT
Start: 2023-05-02 | End: 2023-12-01

## 2023-05-02 RX ORDER — OXYBUTYNIN CHLORIDE 10 MG/1
10 TABLET, EXTENDED RELEASE ORAL DAILY
Qty: 90 TABLET | Refills: 3 | Status: ON HOLD | OUTPATIENT
Start: 2023-05-02 | End: 2023-12-01

## 2023-05-02 RX ORDER — PANTOPRAZOLE SODIUM 40 MG/1
40 TABLET, DELAYED RELEASE ORAL 2 TIMES DAILY
Qty: 180 TABLET | Refills: 3 | Status: ON HOLD | OUTPATIENT
Start: 2023-05-02 | End: 2023-12-01

## 2023-05-02 RX ORDER — DILTIAZEM HYDROCHLORIDE 120 MG/1
120 CAPSULE, COATED, EXTENDED RELEASE ORAL 2 TIMES DAILY
Qty: 180 CAPSULE | Refills: 3 | Status: ON HOLD | OUTPATIENT
Start: 2023-05-02 | End: 2023-12-01

## 2023-05-02 RX ORDER — LEVOTHYROXINE SODIUM 75 UG/1
75 TABLET ORAL DAILY
Qty: 90 TABLET | Refills: 3 | Status: SHIPPED | OUTPATIENT
Start: 2023-05-02 | End: 2023-05-03

## 2023-05-02 ASSESSMENT — PAIN SCALES - GENERAL: PAINLEVEL: MODERATE PAIN (5)

## 2023-05-02 ASSESSMENT — ACTIVITIES OF DAILY LIVING (ADL)
CURRENT_FUNCTION: HOUSEWORK REQUIRES ASSISTANCE
CURRENT_FUNCTION: PREPARING MEALS REQUIRES ASSISTANCE

## 2023-05-02 NOTE — PROGRESS NOTES
"SUBJECTIVE:   Mya is a 83 year old who presents for Preventive Visit.      5/2/2023    10:08 AM   Additional Questions   Roomed by Maya TORRES   Accompanied by self   Patient has been advised of split billing requirements and indicates understanding: Yes  Are you in the first 12 months of your Medicare coverage?  No    Healthy Habits:     In general, how would you rate your overall health?  Good    Frequency of exercise:  6-7 days/week (walking the halls per physical therapist)    Duration of exercise:  15-30 minutes    Do you usually eat at least 4 servings of fruit and vegetables a day, include whole grains    & fiber and avoid regularly eating high fat or \"junk\" foods?  No    Taking medications regularly:  0    Barriers to taking medications:  None (sometimes forgets but not very often)    Medication side effects:  None    Ability to successfully perform activities of daily living:  Preparing meals requires assistance and housework requires assistance (preparing meals only if she has to stand a long time)    Home Safety:  No safety concerns identified    Hearing Impairment:  Difficulty following a conversation in a noisy restaurant or crowded room    In the past 6 months, have you been bothered by leaking of urine? Yes    In general, how would you rate your overall mental or emotional health?  Good      PHQ-2 Total Score: 0    Additional concerns today:  Yes (looking in ears to see wax)  History of Present Illness       Reason for visit:  Just a check up    She eats 0-1 servings of fruits and vegetables daily.She consumes 0 sweetened beverage(s) daily.She exercises with enough effort to increase her heart rate 9 or less minutes per day.  She exercises with enough effort to increase her heart rate 3 or less days per week.   She is taking medications regularly.  She is not taking prescribed medications regularly due to None (sometimes forgets but not very often).      She just wants you to check her ears and see how " much wax she may have in them. She is not having any pain in them, just wasn't sure if her hearing aids are just getting too old and not working as well or ears are plugged with wax. She has been having fatigue since she quit working, she thought maybe it was just old age but wants to discuss. Also brought an EKG machine that a doctor recommended to her to use and she wants you to look at the device.        Have you ever done Advance Care Planning? (For example, a Health Directive, POLST, or a discussion with a medical provider or your loved ones about your wishes): Yes, advance care planning is on file.    Fall risk  Fallen 2 or more times in the past year?: No  Any fall with injury in the past year?: No    Cognitive Screening   1) Repeat 3 items (Leader, Season, Table)    2) Clock draw: NORMAL  3) 3 item recall: Recalls 1 object   Results: NORMAL clock, 1-2 items recalled: COGNITIVE IMPAIRMENT LESS LIKELY    Mini-CogTM Copyright NERI Cornejo. Licensed by the author for use in [facility]; reprinted with permission (beatriz@[facility]). All rights reserved.      Do you have sleep apnea, excessive snoring or daytime drowsiness?: no    Reviewed and updated as needed this visit by clinical staff   Tobacco  Allergies  Meds  Problems  Med Hx  Surg Hx  Fam Hx          Reviewed and updated as needed this visit by Provider   Tobacco  Allergies  Meds  Problems  Med Hx  Surg Hx  Fam Hx         Social History     Tobacco Use     Smoking status: Never     Smokeless tobacco: Never   Vaping Use     Vaping status: Never Used   Substance Use Topics     Alcohol use: Yes           5/2/2023    10:25 AM   Alcohol Use   Prescreen: >3 drinks/day or >7 drinks/week? Not Applicable     Do you have a current opioid prescription? No  Do you use any other controlled substances or medications that are not prescribed by a provider? None    Current providers sharing in care for this patient include:  Patient Care  "Team:  Kisha Stinson PA-C as PCP - General (Physician Assistant - Medical)  [facility], [facility] ([facility]  Reuben Spain MD as MD (Cardiovascular Disease)  Fareed Roldan PA-C as Assigned PCP  Naman Choe MD as Assigned Musculoskeletal Provider  Reuben Spain MD as Assigned Heart and Vascular Provider  Mumtaz Waite MD as Assigned Neuroscience Provider    The following health maintenance items are reviewed in Epic and correct as of today:  Health Maintenance   Topic Date Due     URINE DRUG SCREEN  Never done     MEDICARE ANNUAL WELLNESS VISIT  10/22/2020     A1C  11/13/2022     HEMOGLOBIN  01/12/2023     BMP  05/13/2023     LIPID  05/13/2023     MICROALBUMIN  05/13/2023     TSH W/FREE T4 REFLEX  05/13/2023     DIABETIC FOOT EXAM  05/13/2023     ANNUAL REVIEW OF HM ORDERS  05/13/2023     EYE EXAM  06/22/2023     FALL RISK ASSESSMENT  05/02/2024     DTAP/TDAP/TD IMMUNIZATION (2 - Td or Tdap) 04/08/2025     ADVANCE CARE PLANNING  05/02/2028     PHQ-2 (once per calendar year)  Completed     INFLUENZA VACCINE  Completed     Pneumococcal Vaccine: 65+ Years  Completed     URINALYSIS  Completed     ZOSTER IMMUNIZATION  Completed     COVID-19 Vaccine  Completed     IPV IMMUNIZATION  Aged Out     MENINGITIS IMMUNIZATION  Aged Out     DEXA  Discontinued       Mammogram Screening - Mammography discussed and declined  Pertinent mammograms are reviewed under the imaging tab.    Review of Systems  Constitutional, HEENT, cardiovascular, pulmonary, GI, , musculoskeletal, neuro, skin, endocrine and psych systems are negative, except as otherwise noted.    OBJECTIVE:   /54   Pulse 64   Temp 97.7  F (36.5  C) (Temporal)   Resp 18   Ht 1.626 m (5' 4\")   Wt 93.7 kg (206 lb 8 oz)   LMP  (LMP Unknown)   SpO2 95%   BMI 35.45 kg/m   Estimated body mass index is 35.45 kg/m  as calculated from the following:    Height as of this encounter: " "1.626 m (5' 4\").    Weight as of this encounter: 93.7 kg (206 lb 8 oz).  Physical Exam  Constitutional:       General: She is not in acute distress.     Appearance: She is well-developed.   HENT:      Head:      Comments: Left ear normal   Right ear with impacted cerumen, this was removed using alligator forceps, ear was re-examined and found to be free of cerumen, TM normal without effusion, injection, or bulging       Right Ear: External ear normal. There is impacted cerumen.      Left Ear: External ear normal.      Nose: Nose normal.      Mouth/Throat:      Pharynx: No oropharyngeal exudate.   Eyes:      General:         Right eye: No discharge.         Left eye: No discharge.      Conjunctiva/sclera: Conjunctivae normal.      Pupils: Pupils are equal, round, and reactive to light.   Neck:      Thyroid: No thyromegaly.      Vascular: No JVD.      Trachea: No tracheal deviation.   Cardiovascular:      Rate and Rhythm: Normal rate and regular rhythm.      Heart sounds: Normal heart sounds. No murmur heard.     No friction rub. No gallop.   Pulmonary:      Effort: Pulmonary effort is normal. No respiratory distress.      Breath sounds: Normal breath sounds. No stridor. No wheezing or rales.   Abdominal:      General: Bowel sounds are normal. There is no distension.      Palpations: Abdomen is soft. There is no mass.      Tenderness: There is no abdominal tenderness. There is no guarding or rebound.      Hernia: No hernia is present.   Musculoskeletal:         General: Normal range of motion.      Cervical back: Normal range of motion and neck supple.   Lymphadenopathy:      Cervical: No cervical adenopathy.   Skin:     General: Skin is warm and dry.   Neurological:      Mental Status: She is alert and oriented to person, place, and time.   Psychiatric:         Behavior: Behavior normal.         Thought Content: Thought content normal.         Judgment: Judgment normal.       Diagnostic Test Results:  Labs reviewed " in Epic    ASSESSMENT / PLAN:       ICD-10-CM    1. Encounter for Medicare annual wellness exam  Z00.00 PRIMARY CARE FOLLOW-UP SCHEDULING      2. Diabetes mellitus with stage 3 chronic kidney disease (H)  E11.22 HEMOGLOBIN A1C    N18.30 Hemoglobin     Lipid panel reflex to direct LDL Non-fasting     Albumin Random Urine Quantitative with Creat Ratio     blood glucose (ACCU-CHEK GUIDE) test strip     Albumin Random Urine Quantitative with Creat Ratio     Lipid panel reflex to direct LDL Non-fasting     Hemoglobin     HEMOGLOBIN A1C     OFFICE/OUTPT VISIT,EST,LEVL IV      3. Essential hypertension with goal blood pressure less than 140/90  I10 diltiazem ER COATED BEADS (CARDIZEM CD/CARTIA XT) 120 MG 24 hr capsule     Comprehensive metabolic panel (BMP + Alb, Alk Phos, ALT, AST, Total. Bili, TP)     lisinopril (ZESTRIL) 40 MG tablet     Comprehensive metabolic panel (BMP + Alb, Alk Phos, ALT, AST, Total. Bili, TP)     OFFICE/OUTPT VISIT,EST,LEVL IV      4. Hypothyroidism, unspecified type  E03.9 TSH WITH FREE T4 REFLEX     Comprehensive metabolic panel (BMP + Alb, Alk Phos, ALT, AST, Total. Bili, TP)     Comprehensive metabolic panel (BMP + Alb, Alk Phos, ALT, AST, Total. Bili, TP)     TSH WITH FREE T4 REFLEX     T4 free     levothyroxine (SYNTHROID/LEVOTHROID) 100 MCG tablet     TSH with free T4 reflex     OFFICE/OUTPT VISIT,EST,LEVL IV     DISCONTINUED: levothyroxine (SYNTHROID/LEVOTHROID) 75 MCG tablet      5. OAB (overactive bladder)  N32.81 oxybutynin ER (DITROPAN XL) 10 MG 24 hr tablet     OFFICE/OUTPT VISIT,EST,LEVL IV      6. Gastroesophageal reflux disease without esophagitis  K21.9 pantoprazole (PROTONIX) 40 MG EC tablet     OFFICE/OUTPT VISIT,EST,LEVL IV      7. Hyperlipidemia with target LDL less than 100  E78.5 pravastatin (PRAVACHOL) 40 MG tablet     cholestyramine (QUESTRAN) 4 g packet     OFFICE/OUTPT VISIT,EST,LEVL IV      8. Hypokalemia  E87.6 potassium chloride ER (K-TAB/KLOR-CON) 10 MEQ CR tablet     " OFFICE/OUTPT VISIT,EST,LEVL IV      9. Diabetes mellitus with peripheral vascular disease (H)  E11.51 OFFICE/OUTPT VISIT,EST,LEVL IV      10. Morbid obesity (H)  E66.01       11. Impacted cerumen of right ear  H61.21 REMOVE IMPACTED CERUMEN        - Cerumen found in right ear, removed by provider to resolution   - Reviewed all medication use and side effects      Refilled as needed   - Will update labs today  - Results will be communicated to patient when available and appropriate medication changes made if necessary   - A1C has been stable for quite some time   - Fatigued     Discussed could be due to thyroid, will await labs         Patient has been advised of split billing requirements and indicates understanding: Yes      COUNSELING:  Reviewed preventive health counseling, as reflected in patient instructions  Special attention given to:       Regular exercise       Healthy diet/nutrition       Bladder control       Fall risk prevention       Advanced Planning       BMI:   Estimated body mass index is 35.45 kg/m  as calculated from the following:    Height as of this encounter: 1.626 m (5' 4\").    Weight as of this encounter: 93.7 kg (206 lb 8 oz).   Weight management plan: Discussed healthy diet and exercise guidelines      She reports that she has never smoked. She has never used smokeless tobacco.      Appropriate preventive services were discussed with this patient, including applicable screening as appropriate for cardiovascular disease, diabetes, osteopenia/osteoporosis, and glaucoma.  As appropriate for age/gender, discussed screening for colorectal cancer, prostate cancer, breast cancer, and cervical cancer. Checklist reviewing preventive services available has been given to the patient.    Reviewed patients plan of care and provided an AVS. The Basic Care Plan (routine screening as documented in Health Maintenance) for Mya meets the Care Plan requirement. This Care Plan has been established and " reviewed with the Patient.    Review of the result(s) of each unique test - See list         Diagnosis or treatment significantly limited by social determinants of health - None     30 minutes spent on the date of the encounter doing chart review, history and exam, documentation and further activities as noted above    The patient indicates understanding of these issues and agrees with the plan.    Follow up: 6 months for diabetes recheck     Kisha Stinson PA-C  [facility]    Identified Health Risks:    I have reviewed Opioid Use Disorder and Substance Use Disorder risk factors and made any needed referrals.    The patient was counseled and encouraged to consider modifying their diet and eating habits. She was provided with information on recommended healthy diet options.  The patient reports that she has difficulty with activities of daily living. I have asked that the patient make a follow up appointment in 6 months where this issue will be further evaluated and addressed.  The patient was provided with written information regarding signs of hearing loss.  Information on urinary incontinence and treatment options given to patient.

## 2023-05-02 NOTE — PROGRESS NOTES
The patient was counseled and encouraged to consider modifying their diet and eating habits. She was provided with information on recommended healthy diet options.  The patient reports that she has difficulty with activities of daily living. I have asked that the patient make a follow up appointment in *** weeks where this issue will be further evaluated and addressed.  The patient was provided with written information regarding signs of hearing loss.  Information on urinary incontinence and treatment options given to patient.

## 2023-05-02 NOTE — PATIENT INSTRUCTIONS
Patient Education   Personalized Prevention Plan  You are due for the preventive services outlined below.  Your care team is available to assist you in scheduling these services.  If you have already completed any of these items, please share that information with your care team to update in your medical record.  Health Maintenance Due   Topic Date Due     URINE DRUG SCREEN  Never done     Annual Wellness Visit  10/22/2020     A1C Lab  11/13/2022     Hemoglobin  01/12/2023     Basic Metabolic Panel  05/13/2023     Cholesterol Lab  05/13/2023     Kidney Microalbumin Urine Test  05/13/2023     Thyroid Function Lab  05/13/2023     Diabetic Foot Exam  05/13/2023     ANNUAL REVIEW OF HM ORDERS  05/13/2023       Understanding USDA MyPlate  The USDA has guidelines to help you make healthy food choices. These are called MyPlate. MyPlate shows the food groups that make up healthy meals using the image of a place setting. Before you eat, think about the healthiest choices for what to put on your plate or in your cup or bowl. To learn more about building a healthy plate, visit www.choosemyplate.gov.     The food groups    Fruits. Any fruit or 100% fruit juice counts as part of the Fruit Group. Fruits may be fresh, canned, frozen, or dried, and may be whole, cut-up, or pureed. Make 1/2 of your plate fruits and vegetables.    Vegetables. Any vegetable or 100% vegetable juice counts as a member of the Vegetable Group. Vegetables may be fresh, frozen, canned, or dried. They can be served raw or cooked and may be whole, cut-up, or mashed. Make 1/2 of your plate fruits and vegetables.    Grains. All foods made from grains are part of the Grains Group. These include wheat, rice, oats, cornmeal, and barley. Grains are often used to make foods such as bread, pasta, oatmeal, cereal, tortillas, and grits. Grains should be no more than 1/4 of your plate. At least half of your grains should be whole grains.    Protein. This group  includes meat, poultry, seafood, beans and peas, eggs, processed soy products (such as tofu), nuts (including nut butters), and seeds. Make protein choices no more than 1/4 of your plate. Meat and poultry choices should be lean or low fat.    Dairy. The Dairy Group includes all fluid milk products and foods made from milk that contain calcium, such as yogurt and cheese. (Foods that have little calcium, such as cream, butter, and cream cheese, are not part of this group.) Most dairy choices should be low-fat or fat-free.    Oils. Oils aren't a food group, but they do contain essential nutrients. However it's important to watch your intake of oils. These are fats that are liquid at room temperature. They include canola, corn, olive, soybean, vegetable, and sunflower oil. Foods that are mainly oil include mayonnaise, certain salad dressings, and soft margarines. You likely already get your daily oil allowance from the foods you eat.  Things to limit  Eating healthy also means limiting these things in your diet:    Salt (sodium). Many processed foods have a lot of sodium. To keep sodium intake down, eat fresh vegetables, meats, poultry, and seafood when possible. Purchase low-sodium, reduced-sodium, or no-salt-added food products at the store. And don't add salt to your meals at home. Instead, season them with herbs and spices such as dill, oregano, cumin, and paprika. Or try adding flavor with lemon or lime zest and juice.    Saturated fat. Saturated fats are most often found in animal products such as beef, pork, and chicken. They are often solid at room temperature, such as butter. To reduce your saturated fat intake, choose leaner cuts of meat and poultry. And try healthier cooking methods such as grilling, broiling, roasting, or baking. For a simple lower-fat swap, use plain nonfat yogurt instead of mayonnaise when making potato salad or macaroni salad.    Added sugars. These are sugars added to foods. They are in  foods such as ice cream, candy, soda, fruit drinks, sports drinks, energy drinks, cookies, pastries, jams, and syrups. Cut down on added sugars by sharing sweet treats with a family member or friend. You can also choose fruit for dessert, and drink water or other unsweetened beverages.  [employer] last reviewed this educational content on 6/1/2020 2000-2022 [employer]. All rights reserved. This information is not intended as a substitute for professional medical care. Always follow your healthcare professional's instructions.        Activities of Daily Living    Your Health Risk Assessment indicates you have difficulties with activities of daily living such as housework, bathing, preparing meals, taking medication, etc. Please make a follow up appointment for us to address this issue in more detail.    Signs of Hearing Loss  Hearing loss is a problem shared by many people. In fact, it's one of the most common health problems, particularly as people age. Most people aged 65 and older have some hearing loss. By age 80, almost everyone does. Hearing loss often occurs slowly over the years. So, you may not realize your hearing has gotten worse.   When sudden hearing loss occurs, it's important to contact your healthcare provider right away. Your provider will do a medical exam and a hearing exam as soon as possible. This is to help find the cause and type of your sudden hearing loss. Based on your diagnosis, your healthcare provider will discuss possible treatments.      Hearing much better with one ear can be a sign of hearing loss.     Have your hearing checked  Call your healthcare provider if you:     Have to strain to hear normal conversation    Have to watch other people s faces very carefully to follow what they re saying    Need to ask people to repeat what they ve said    Often misunderstand what people are saying    Turn the volume of the television or radio up so high that others  complain    Feel that people are mumbling when they re talking to you    Find that the effort to hear leaves you feeling tired and irritated    Notice, when using the phone, that you hear better with one ear than the other  [employer] last reviewed this educational content on 6/1/2022 2000-2022 [employer]. All rights reserved. This information is not intended as a substitute for professional medical care. Always follow your healthcare professional's instructions.          Urinary Incontinence, Female (Adult)   Urinary incontinence means loss of bladder control. This problem affects many women, especially as they get older. If you have incontinence, you may be embarrassed to ask for help. But know that this problem can be treated.   Types of Incontinence  There are different types of incontinence. Two of the main types are described here. You can have more than one type.     Stress incontinence. With this type, urine leaks when pressure (stress) is put on the bladder. This may happen when you cough, sneeze, or laugh. Stress incontinence most often occurs because the pelvic floor muscles that support the bladder and urethra are weak. This can happen after pregnancy and vaginal childbirth or a hysterectomy. It can also be due to excess body weight or hormone changes.    Urge incontinence (also called overactive bladder). With this type, a sudden urge to urinate is felt often. This may happen even though there may not be much urine in the bladder. The need to urinate often during the night is common. Urge incontinence most often occurs because of bladder spasms. This may be due to bladder irritation or infection. Damage to bladder nerves or pelvic muscles, constipation, and certain medicines can also lead to urge incontinence.  Treatment depends on the cause. Further evaluation is needed to find the type you have. This will likely include an exam and certain tests. Based on the results, you and your  healthcare provider can then plan treatment. Until a diagnosis is made, the home care tips below can help ease symptoms.   Home care    Do pelvic floor muscle exercises, if they are prescribed. The pelvic floor muscles help support the bladder and urethra. Many women find that their symptoms improve when doing special exercises that strengthen these muscles. To do the exercises, contract the muscles you would use to stop your stream of urine. But do this when you re not urinating. Hold for 10 seconds, then relax. Repeat 10 to 20 times in a row, at least 3 times a day. Your healthcare provider may give you other instructions for how to do the exercises and how often.    Keep a bladder diary. This helps track how often and how much you urinate over a set period of time. Bring this diary with you to your next visit with the provider. The information can help your provider learn more about your bladder problem.    Lose weight, if advised to by your provider. Extra weight puts pressure on the bladder. Your provider can help you create a weight-loss plan that s right for you. This may include exercising more and making certain diet changes.    Don't have foods and drinks that may irritate the bladder. These can include alcohol and caffeinated drinks.    Quit smoking. Smoking and other tobacco use can lead to a long-term (chronic) cough that strains the pelvic floor muscles. Smoking may also damage the bladder and urethra. Talk with your provider about treatments or methods you can use to quit smoking.    If drinking large amounts of fluid makes you have symptoms, you may be advised to limit your fluid intake. You may also be advised to drink most of your fluids during the day and to limit fluids at night.    If you re worried about urine leakage or accidents, you may wear absorbent pads to catch urine. Change the pads often. This helps reduce discomfort. It may also reduce the risk of skin or bladder  infections.    Follow-up care  Follow up with your healthcare provider, or as directed. It may take some to find the right treatment for your problem. But healthy lifestyle changes can be made right away. These include such things as exercising on a regular basis, eating a healthy diet, losing weight (if needed), and quitting smoking. Your treatment plan may include special therapies or medicines. Certain procedures or surgery may also be options. Talk about any questions you have with your provider.   When to seek medical advice  Call the healthcare provider right away if any of these occur:    Fever of 100.4 F (38 C) or higher, or as directed by your provider    Bladder pain or fullness    Belly swelling    Nausea or vomiting    Back pain    Weakness, dizziness, or fainting  [employer] last reviewed this educational content on 1/1/2020 2000-2022 [employer]. All rights reserved. This information is not intended as a substitute for professional medical care. Always follow your healthcare professional's instructions.

## 2023-05-03 RX ORDER — LEVOTHYROXINE SODIUM 100 UG/1
100 TABLET ORAL DAILY
Qty: 90 TABLET | Refills: 3 | Status: SHIPPED | OUTPATIENT
Start: 2023-05-03 | End: 2024-01-16

## 2023-05-03 NOTE — RESULT ENCOUNTER NOTE
Please call patient with the following message    Labs are stable except thyroid shows that medication could use and adjustment. I recommend we increase levothyroxine to 100 mcg. I have sent new rx. This should help with fatigue. She should be scheduled for a lab only recheck in 2 months.     Carlos Stinson PA-C

## 2023-05-04 ENCOUNTER — TELEPHONE (OUTPATIENT)
Dept: FAMILY MEDICINE | Facility: OTHER | Age: 83
End: 2023-05-04
Payer: COMMERCIAL

## 2023-05-04 NOTE — TELEPHONE ENCOUNTER
Patient returned call to the clinic.   Discussed labs. No further questions.   Jose Jacques, JOSE RN, RN, PHN  [place] [place]  May 4, 2023

## 2023-05-04 NOTE — TELEPHONE ENCOUNTER
Called and spoke to patient, she stated she will be giving us a call back later regarding her labs, please look into her lab results for the message from Kisha Orr.    Susan St, CMA

## 2023-05-31 ENCOUNTER — TELEPHONE (OUTPATIENT)
Dept: NEUROSURGERY | Facility: CLINIC | Age: 83
End: 2023-05-31
Payer: COMMERCIAL

## 2023-05-31 NOTE — TELEPHONE ENCOUNTER
Patient would like a call back to go over EMG results from April (in chart) Please call patient back at [phone]. Thank you~

## 2023-06-01 NOTE — TELEPHONE ENCOUNTER
Dr. Waite [facility] pt scheduled follow-up to discuss EMG. Routed to scheduling.

## 2023-06-08 ENCOUNTER — OFFICE VISIT (OUTPATIENT)
Dept: NEUROSURGERY | Facility: CLINIC | Age: 83
End: 2023-06-08
Payer: COMMERCIAL

## 2023-06-08 VITALS
RESPIRATION RATE: 18 BRPM | OXYGEN SATURATION: 94 % | TEMPERATURE: 96.5 F | SYSTOLIC BLOOD PRESSURE: 124 MMHG | BODY MASS INDEX: 35.85 KG/M2 | HEIGHT: 64 IN | WEIGHT: 210 LBS | DIASTOLIC BLOOD PRESSURE: 62 MMHG | HEART RATE: 72 BPM

## 2023-06-08 DIAGNOSIS — M54.16 LUMBAR RADICULOPATHY: Primary | ICD-10-CM

## 2023-06-08 PROCEDURE — 99213 OFFICE O/P EST LOW 20 MIN: CPT | Performed by: NEUROLOGICAL SURGERY

## 2023-06-08 ASSESSMENT — PAIN SCALES - GENERAL: PAINLEVEL: MILD PAIN (2)

## 2023-06-08 NOTE — PROGRESS NOTES
Faxed open MRI and scoliosis x-ray order to [facility].  Fax [phone]. Confirmed through [facility].    LM for with [facility] phone number ([phone]) as patient was not 100% sure at the visit who to call for imaging.    Mansi Quinonez RN on 6/8/2023 at 2:47 PM

## 2023-06-08 NOTE — PROGRESS NOTES
81F w/ lumbar scoliosis, stenosis, severe back pain.  Several years of aching, axial back pain.  Notes 7/10 throbbing back pain with radiation to the right thigh, worse with activity and walking.   Did PT and BLANCA in 2018 with excellent relief.  Scans show scoliosis with marked right-kelley curvature, stenosis, and listhesis.    Returns for follow up.  Continued severe right lower back and leg pain.  Underwent BLANCA this summer without improvement.  She saw Dr. Choe for eval who didn't note any orthopedic issues.  New MR Lumbar, personally reviewed, shows marked scoliosis, multiple levels of severe foraminal stenosis, and L3-4 moderate/severe central and lateral recess stenosis.    Returns for follow up.  Continued severe right leg pain.  Has been needing a walker and has difficulty even standing in the kitchen.  SI joint injection didn't help.  Has been doing PT with only mild improvement.    Past Medical History:   Diagnosis Date     Acute cholecystitis 3/23/2018     Acute kidney injury (H) 9/10/2017     Arthritis      Dehydration 9/4/2017     Diabetes type 2, controlled (H)      Elevated lactic acid level 9/10/2017     GERD (gastroesophageal reflux disease)      History of renal calculi      HTN, goal below 140/90      Hyperlipidaemia LDL goal < 100      Hypokalemia 9/3/2017     Hypothyroid      Insomnia      Left carotid stenosis      Migraine      Motion sickness      PONV (postoperative nausea and vomiting)      Sciatica of right side 6/28/2013     Type 2 diabetes mellitus without complication, without long-term current use of insulin (H) 2/16/2017     Past Surgical History:   Procedure Laterality Date     BUNIONECTOMY      Right foot     CATARACT IOL, RT/LT Bilateral 2017     COLONOSCOPY  7/12/2013    Procedure: COLONOSCOPY;  Colonoscopy;  Surgeon: Giovany Espinoza MD;  Location:  GI     COLONOSCOPY N/A 1/31/2019    Procedure: Combined Colonoscopy, Single Or Multiple Biopsy/Polypectomy By Biopsy;   Surgeon: Efren Osorio MD;  Location: [facility] OR     COLONOSCOPY WITH CO2 INSUFFLATION N/A 1/31/2019    Procedure: COLONOSCOPY WITH CO2 INSUFFLATION;  Surgeon: Efren Osorio MD;  Location: [facility] OR     FRACTURE TX, ANKLE RT/LT      Left ankle     HC CYSTOURETHROSCOPY W/ URETEROSCOPY &/OR PYELOSCOPY; W/ LITHOTRIPSY       HC REVISE MEDIAN N/CARPAL TUNNEL SURG      Right wrist     INJECT EPIDURAL LUMBAR Right 12/27/2018    Procedure: INJECT EPIDURAL LUMBAR right foraminal narrowing severe at L4-L5 and moderate at L5-S1;  Surgeon: Gilbert Mcclure MD;  Location: [facility] OR     INJECT EPIDURAL TRANSFORAMINAL Bilateral 5/27/2021    Procedure: Bilateral Lumbar 5-sacral 1 Epidural steroid injection;  Surgeon: Gilbert Mcclure MD;  Location: [facility] OR     INJECT EPIDURAL TRANSFORAMINAL Right 6/17/2022    Procedure: Attempted right Lumbar 4-5 and Lumbar 5-Sacral 1 epidural injections with completion of only the right Lumbar 5-Sacral1 Transforaminal Epidural Steroid Injection with fluoroscopic guidance and contrast;  Surgeon: Gilbert Mcclure MD;  Location: [facility] OR     INJECT JOINT SACROILIAC  4/10/2014    Procedure: INJECT JOINT SACROILIAC;  Sacroiliac Joint Injection;  Surgeon: Gilbert Mcclure MD;  Location: [facility]     INJECT JOINT SACROILIAC Left 4/11/2019    Procedure: INJECT JOINT SACROILIAC- LEFT;  Surgeon: Gilbert Mcclure MD;  Location: [facility]     INJECT JOINT SACROILIAC Right 3/3/2023    Procedure: Fluoroscopically-guided injection into the Right Sacroiliac joint;  Surgeon: Gilbert Mcclure MD;  Location: [facility] OR     LAPAROSCOPIC CHOLECYSTECTOMY N/A 3/24/2018    Procedure: LAPAROSCOPIC CHOLECYSTECTOMY;  Laparoscopic Cholecystectomy;  Surgeon: Berry Allen DO;  Location: [facility] OR     Social History     Socioeconomic History     Marital status:      Spouse name: Not on file     Number of children: Not on file     Years of education: Not on file     Highest education level: Not on file   Occupational  "History     Employer: RETIRED     Comment: 's office part time   Tobacco Use     Smoking status: Never     Smokeless tobacco: Never   Vaping Use     Vaping status: Never Used   Substance and Sexual Activity     Alcohol use: Yes     Drug use: No     Sexual activity: Not Currently   Other Topics Concern     Parent/sibling w/ CABG, MI or angioplasty before 65F 55M? Not Asked   Social History Narrative     Not on file     Social Determinants of Health     Financial Resource Strain: Not on file   Food Insecurity: Not on file   Transportation Needs: Not on file   Physical Activity: Not on file   Stress: Not on file   Social Connections: Not on file   Intimate Partner Violence: Not on file   Housing Stability: Not on file     Family History   Problem Relation Age of Onset     Hypertension Brother      Cerebrovascular Disease Brother      Diabetes Father      Cardiovascular Father      Diabetes Brother         Borderline     Breast Cancer Mother      Diabetes Brother      Blood Disease Brother      Cardiovascular Brother         MI        ROS: 10 point ROS neg other than the symptoms noted above in the HPI.    Physical Exam  /62   Pulse 72   Temp (!) 96.5  F (35.8  C) (Temporal)   Resp 18   Ht 1.626 m (5' 4\")   Wt 95.3 kg (210 lb)   LMP  (LMP Unknown)   SpO2 94%   BMI 36.05 kg/m    HEENT:  Normocephalic, atraumatic.  PERRLA.  EOM s intact.  Visual fields full to gross exam  Neck:  Supple, non-tender, without lymphadenopathy.  Heart:  No peripheral edema  Lungs:  No SOB  Abdomen:  Non-distended.   Skin:  Warm and dry.  Extremities:  No edema, cyanosis or clubbing.  Psychiatric:  No apparent distress  Musculoskeletal:  Normal bulk and tone    NEUROLOGICAL EXAMINATION:     Mental status:  Alert and Oriented x 3, speech is fluent.  Cranial nerves:  II-XII intact.   Motor:    Shoulder Abduction:  Right:  5/5   Left:  5/5  Biceps:                      Right:  5/5   Left:  5/5  Triceps:                     " Right:  5/5   Left:  5/5  Wrist Extensors:       Right:  5/5   Left:  5/5  Wrist Flexors:           Right:  5/5   Left:  5/5  interosseus :            Right:  5/5   Left:  5/5  Hip Flexion:                Right: 5/5  Left:  5/5  Quadriceps:             Right:  5/5  Left:  5/5  Hamstrings:             Right:  5/5  Left:  5/5  Gastroc Soleus:        Right:  5/5  Left:  5/5  Tib/Ant:                      Right:  5/5  Left:  5/5  EHL:                     Right:  5/5  Left:  5/5  Sensation:  Intact  Reflexes:  Negative Babinski.  Negative Clonus.  Negative Clemente's.  Coordination:  Smooth finger to nose testing.   Negative pronator drift.  Requires a walker and difficulty standing.    A/P:  81F w/ lumbar scoliosis, stenosis, severe back pain    Will obtain updated MRI and XR Scoliosis  Will order right L4-5 and L5-S1 transforaminal BLANCA

## 2023-06-08 NOTE — LETTER
6/8/2023         RE: Mya Hui  [address]  [facility] [address]        Dear Colleague,    Thank you for referring your patient, Mya Hui, to the [facility] [place]. Please see a copy of my visit note below.    81F w/ lumbar scoliosis, stenosis, severe back pain.  Several years of aching, axial back pain.  Notes 7/10 throbbing back pain with radiation to the right thigh, worse with activity and walking.   Did PT and BLANCA in 2018 with excellent relief.  Scans show scoliosis with marked right-kelley curvature, stenosis, and listhesis.    Returns for follow up.  Continued severe right lower back and leg pain.  Underwent BLANCA this summer without improvement.  She saw Dr. Choe for eval who didn't note any orthopedic issues.  New MR Lumbar, personally reviewed, shows marked scoliosis, multiple levels of severe foraminal stenosis, and L3-4 moderate/severe central and lateral recess stenosis.    Returns for follow up.  Continued severe right leg pain.  Has been needing a walker and has difficulty even standing in the kitchen.  SI joint injection didn't help.  Has been doing PT with only mild improvement.    Past Medical History:   Diagnosis Date     Acute cholecystitis 3/23/2018     Acute kidney injury (H) 9/10/2017     Arthritis      Dehydration 9/4/2017     Diabetes type 2, controlled (H)      Elevated lactic acid level 9/10/2017     GERD (gastroesophageal reflux disease)      History of renal calculi      HTN, goal below 140/90      Hyperlipidaemia LDL goal < 100      Hypokalemia 9/3/2017     Hypothyroid      Insomnia      Left carotid stenosis      Migraine      Motion sickness      PONV (postoperative nausea and vomiting)      Sciatica of right side 6/28/2013     Type 2 diabetes mellitus without complication, without long-term current use of insulin (H) 2/16/2017     Past Surgical History:   Procedure Laterality Date     BUNIONECTOMY      Right foot      CATARACT IOL, RT/LT Bilateral 2017     COLONOSCOPY  7/12/2013    Procedure: COLONOSCOPY;  Colonoscopy;  Surgeon: Giovany Espinoza MD;  [facility]     COLONOSCOPY N/A 1/31/2019    Procedure: Combined Colonoscopy, Single Or Multiple Biopsy/Polypectomy By Biopsy;  Surgeon: Efren Osorio MD;  Location: [facility]     COLONOSCOPY WITH CO2 INSUFFLATION N/A 1/31/2019    Procedure: COLONOSCOPY WITH CO2 INSUFFLATION;  Surgeon: Efren Osorio MD;  Location: [facility]     FRACTURE TX, ANKLE RT/LT      Left ankle     HC CYSTOURETHROSCOPY W/ URETEROSCOPY &/OR PYELOSCOPY; W/ LITHOTRIPSY       HC REVISE MEDIAN N/CARPAL TUNNEL SURG      Right wrist     INJECT EPIDURAL LUMBAR Right 12/27/2018    Procedure: INJECT EPIDURAL LUMBAR right foraminal narrowing severe at L4-L5 and moderate at L5-S1;  Surgeon: Gilbert Mcclure MD;  [facility]     INJECT EPIDURAL TRANSFORAMINAL Bilateral 5/27/2021    Procedure: Bilateral Lumbar 5-sacral 1 Epidural steroid injection;  Surgeon: Gilbert Mcclure MD;  [facility]     INJECT EPIDURAL TRANSFORAMINAL Right 6/17/2022    Procedure: Attempted right Lumbar 4-5 and Lumbar 5-Sacral 1 epidural injections with completion of only the right Lumbar 5-Sacral1 Transforaminal Epidural Steroid Injection with fluoroscopic guidance and contrast;  Surgeon: Gilbert Mcclure MD;  [facility]     INJECT JOINT SACROILIAC  4/10/2014    Procedure: INJECT JOINT SACROILIAC;  Sacroiliac Joint Injection;  Surgeon: Gilbert Mcclure MD;  [facility]: [facility]     INJECT JOINT SACROILIAC Left 4/11/2019    Procedure: INJECT JOINT SACROILIAC- LEFT;  Surgeon: Gilbert Mcclure MD;  [facility]     INJECT JOINT SACROILIAC Right 3/3/2023    Procedure: Fluoroscopically-guided injection into the Right Sacroiliac joint;  Surgeon: Gilbert Mcclure MD;  [facility]     LAPAROSCOPIC CHOLECYSTECTOMY N/A 3/24/2018    Procedure: LAPAROSCOPIC CHOLECYSTECTOMY;  Laparoscopic Cholecystectomy;  Surgeon:  "Berry Allen, DO;  Location:  OR     Social History     Socioeconomic History     Marital status:      Spouse name: Not on file     Number of children: Not on file     Years of education: Not on file     Highest education level: Not on file   Occupational History     Employer: RETIRED     Comment: 's office part time   Tobacco Use     Smoking status: Never     Smokeless tobacco: Never   Vaping Use     Vaping status: Never Used   Substance and Sexual Activity     Alcohol use: Yes     Drug use: No     Sexual activity: Not Currently   Other Topics Concern     Parent/sibling w/ CABG, MI or angioplasty before 65F 55M? Not Asked   Social History Narrative     Not on file     Social Determinants of Health     Financial Resource Strain: Not on file   Food Insecurity: Not on file   Transportation Needs: Not on file   Physical Activity: Not on file   Stress: Not on file   Social Connections: Not on file   Intimate Partner Violence: Not on file   Housing Stability: Not on file     Family History   Problem Relation Age of Onset     Hypertension Brother      Cerebrovascular Disease Brother      Diabetes Father      Cardiovascular Father      Diabetes Brother         Borderline     Breast Cancer Mother      Diabetes Brother      Blood Disease Brother      Cardiovascular Brother         MI        ROS: 10 point ROS neg other than the symptoms noted above in the HPI.    Physical Exam  /62   Pulse 72   Temp (!) 96.5  F (35.8  C) (Temporal)   Resp 18   Ht 1.626 m (5' 4\")   Wt 95.3 kg (210 lb)   LMP  (LMP Unknown)   SpO2 94%   BMI 36.05 kg/m    HEENT:  Normocephalic, atraumatic.  PERRLA.  EOM s intact.  Visual fields full to gross exam  Neck:  Supple, non-tender, without lymphadenopathy.  Heart:  No peripheral edema  Lungs:  No SOB  Abdomen:  Non-distended.   Skin:  Warm and dry.  Extremities:  No edema, cyanosis or clubbing.  Psychiatric:  No apparent distress  Musculoskeletal:  Normal bulk and " tone    NEUROLOGICAL EXAMINATION:     Mental status:  Alert and Oriented x 3, speech is fluent.  Cranial nerves:  II-XII intact.   Motor:    Shoulder Abduction:  Right:  5/5   Left:  5/5  Biceps:                      Right:  5/5   Left:  5/5  Triceps:                     Right:  5/5   Left:  5/5  Wrist Extensors:       Right:  5/5   Left:  5/5  Wrist Flexors:           Right:  5/5   Left:  5/5  interosseus :            Right:  5/5   Left:  5/5  Hip Flexion:                Right: 5/5  Left:  5/5  Quadriceps:             Right:  5/5  Left:  5/5  Hamstrings:             Right:  5/5  Left:  5/5  Gastroc Soleus:        Right:  5/5  Left:  5/5  Tib/Ant:                      Right:  5/5  Left:  5/5  EHL:                     Right:  5/5  Left:  5/5  Sensation:  Intact  Reflexes:  Negative Babinski.  Negative Clonus.  Negative Clemente's.  Coordination:  Smooth finger to nose testing.   Negative pronator drift.  Requires a walker and difficulty standing.    A/P:  81F w/ lumbar scoliosis, stenosis, severe back pain    Will obtain updated MRI and XR Scoliosis  Will order right L4-5 and L5-S1 transforaminal BLANCA      Again, thank you for allowing me to participate in the care of your patient.        Sincerely,        Mumtaz Waite MD

## 2023-06-08 NOTE — NURSING NOTE
"Mya Hui is a 83 year old female who presents for:  Chief Complaint   Patient presents with     Neurologic Problem     EMG Results         Initial Vitals:  /62   Pulse 72   Temp (!) 96.5  F (35.8  C) (Temporal)   Resp 18   Ht 5' 4\" (1.626 m)   Wt 210 lb (95.3 kg)   LMP  (LMP Unknown)   SpO2 94%   BMI 36.05 kg/m   Estimated body mass index is 36.05 kg/m  as calculated from the following:    Height as of this encounter: 5' 4\" (1.626 m).    Weight as of this encounter: 210 lb (95.3 kg).. Body surface area is 2.07 meters squared. BP completed using cuff size: regular  Mild Pain (2)    Nursing Comments:     Joyce Contreras    " Received call from [facility] with [employer]. Subjective: Caller states \"I slept for fifteen hours. \"     Current Symptoms: Fatigue, pain and redness in left foot. Onset: Yesterday    Associated Symptoms: Toes and top of left foot are red and warm    Pain Severity: Pain in left toenails- not currently, 5/10    Temperature: 12:45 pm- 100.9    What has been tried: Motrin, Tylenol    Recommended disposition: See in Office Today    Care advice provided, patient verbalizes understanding; denies any other questions or concerns; instructed to call back for any new or worsening symptoms. Patient/Caller agrees with recommended disposition; writer provided warm transfer to Romel Yu at [facility] for appointment scheduling     Attention Provider: Thank you for allowing me to participate in the care of your patient. The patient was connected to triage in response to information provided to the ECC/PSC. Please do not respond through this encounter as the response is not directed to a shared pool.     Reason for Disposition   SEVERE pain (e.g., excruciating, unable to do any normal activities)    Protocols used: FOOT PAIN-ADULT-[place]

## 2023-06-12 ENCOUNTER — TELEPHONE (OUTPATIENT)
Dept: NEUROSURGERY | Facility: CLINIC | Age: 83
End: 2023-06-12
Payer: COMMERCIAL

## 2023-06-12 ENCOUNTER — DOCUMENTATION ONLY (OUTPATIENT)
Dept: NEUROSURGERY | Facility: CLINIC | Age: 83
End: 2023-06-12
Payer: COMMERCIAL

## 2023-06-12 ENCOUNTER — TELEPHONE (OUTPATIENT)
Dept: PALLIATIVE MEDICINE | Facility: CLINIC | Age: 83
End: 2023-06-12
Payer: COMMERCIAL

## 2023-06-12 NOTE — TELEPHONE ENCOUNTER
Reason for Call:  Other appointment and call back    Detailed comments: pt calling needing orders for an MRI and Xray sent to [facility]. Says she spoke with the Nurse Mansi about it. Please call pt back with any questions. Thank you    Phone Number Patient can be reached at: Home number on file [phone] (home)    Best Time: any    Can we leave a detailed message on this number? YES    Call taken on 6/12/2023 at 10:10 AM by Shanita Del Cid

## 2023-06-16 ENCOUNTER — TELEPHONE (OUTPATIENT)
Dept: NEUROSURGERY | Facility: CLINIC | Age: 83
End: 2023-06-16
Payer: COMMERCIAL

## 2023-06-16 NOTE — TELEPHONE ENCOUNTER
Need to obtain recent imaging from [facility]. Faxed request to push images to PACS to fax [phone]. RightFax reads transmission error. Will check again Tuesday.    Reports already scanned into media tab. Once images available in PACS, route to Dr. Waite for review/advise.    Mansi Quinonez RN on 6/16/2023 at 4:18 PM

## 2023-06-20 NOTE — TELEPHONE ENCOUNTER
scoli x-rays and lumbar MRI images in PACS. Reports scanned in media..    Routing to Dr. Waite for review/patient results.    Mansi Quinonez RN on 6/20/2023 at 2:24 PM

## 2023-06-26 NOTE — TELEPHONE ENCOUNTER
"Per Dr. Waite, \"XRs and MRI again show the scoliosis with compression of multiple nerves related to the deformity.  No substantial new findings.  She should proceed with the BLANCA, and can come see me in clinic a couple weeks after if she doesn't get relief\".    Patient scheduled for BLANCA 7/13/23.     for return call.    Mansi Quinonez RN on 6/26/2023 at 1:17 PM      "

## 2023-06-28 ENCOUNTER — TRANSFERRED RECORDS (OUTPATIENT)
Dept: HEALTH INFORMATION MANAGEMENT | Facility: CLINIC | Age: 83
End: 2023-06-28
Payer: COMMERCIAL

## 2023-06-28 LAB — RETINOPATHY: NEGATIVE

## 2023-06-29 NOTE — TELEPHONE ENCOUNTER
Called patient and provided result/next step instructions.    Mansi Quinonez RN on 6/29/2023 at 8:39 AM

## 2023-07-13 ENCOUNTER — HOSPITAL ENCOUNTER (OUTPATIENT)
Dept: GENERAL RADIOLOGY | Facility: CLINIC | Age: 83
Discharge: HOME | End: 2023-07-13
Attending: ANESTHESIOLOGY | Admitting: ANESTHESIOLOGY
Payer: COMMERCIAL

## 2023-07-13 ENCOUNTER — HOSPITAL ENCOUNTER (OUTPATIENT)
Facility: CLINIC | Age: 83
Discharge: HOME | End: 2023-07-13
Attending: ANESTHESIOLOGY | Admitting: ANESTHESIOLOGY
Payer: COMMERCIAL

## 2023-07-13 VITALS
DIASTOLIC BLOOD PRESSURE: 89 MMHG | HEIGHT: 64 IN | WEIGHT: 210 LBS | BODY MASS INDEX: 35.85 KG/M2 | HEART RATE: 58 BPM | OXYGEN SATURATION: 98 % | RESPIRATION RATE: 15 BRPM | SYSTOLIC BLOOD PRESSURE: 123 MMHG | TEMPERATURE: 98.2 F

## 2023-07-13 DIAGNOSIS — M54.16 LUMBAR RADICULOPATHY: ICD-10-CM

## 2023-07-13 PROCEDURE — 250N000011 HC RX IP 250 OP 636: Mod: JZ | Performed by: ANESTHESIOLOGY

## 2023-07-13 PROCEDURE — 999N000179 XR SURGERY CARM FLUORO LESS THAN 5 MIN W STILLS: Mod: TC

## 2023-07-13 PROCEDURE — 62323 NJX INTERLAMINAR LMBR/SAC: CPT | Performed by: ANESTHESIOLOGY

## 2023-07-13 PROCEDURE — 250N000011 HC RX IP 250 OP 636: Performed by: ANESTHESIOLOGY

## 2023-07-13 RX ORDER — IOPAMIDOL 612 MG/ML
INJECTION, SOLUTION INTRATHECAL PRN
Status: DISCONTINUED | OUTPATIENT
Start: 2023-07-13 | End: 2023-07-13 | Stop reason: HOSPADM

## 2023-07-13 RX ORDER — TRIAMCINOLONE ACETONIDE 40 MG/ML
INJECTION, SUSPENSION INTRA-ARTICULAR; INTRAMUSCULAR PRN
Status: DISCONTINUED | OUTPATIENT
Start: 2023-07-13 | End: 2023-07-13 | Stop reason: HOSPADM

## 2023-07-13 ASSESSMENT — ACTIVITIES OF DAILY LIVING (ADL): ADLS_ACUITY_SCORE: 35

## 2023-07-13 NOTE — DISCHARGE INSTRUCTIONS

## 2023-07-13 NOTE — OP NOTE
CHIEF COMPLAINT: Low back and radicular lower extremity pains  INDICATIONS FOR PROCEDURE:  1.This patient suffers from moderate to severe back pain and lower extremity radicular pains.    2.This pain has persisted for more than 4 weeks and is causing significant functional disability when they are trying to perform ADL's.   3. They failed conservative care which consisted of giving this pain time to mark, medications, therapy  4. Preoperative NRS pain score was verbally reported to me today as a  7/10.   5. The patients radicular pains correlates to their MRI which shows moderately severe spinal stenosis at L3-4, scoliosis concave to the right of the lumbar spine, Modic changes of the endplates at L3, L4, L5 type II  6.  An order was sent to me to perform the technical component of a lumbar epidural steroid injection.  PROCEDURE: L3-4 interlaminar epidural steroid injection using fluoroscopic guidance with contrast dye.   PROCEDURE DETAILS: After written informed consent was obtained from the patient, the patient was escorted to the procedure room.  The patient was placed in the prone position.  A  time out  was conducted to verify patient identity, procedure to be performed, side, site, allergies and any special requirements.  The skin over the neck and upper back region were prepped and draped in normal sterile fashion with chloraprep. Fluoroscopy was used to identify the interspace in an AP view and the skin was anesthetized with 2 mL of 1% lidocaine with bicarbonate buffer.  Initially I was trying to do a right L3-4 transforaminal injection but after looking at her images under the fluoroscopic imager I felt there was no way I could even place the needle into the right L3-4 foraminal opening due to the severe amount of degeneration and scoliosis off to the right side.  Therefore I tried to do an interlaminar epidural from the right paramedian approach by placing a 25-gauge 5 inch Quincke needle into her right  paramedian epidural interspace however there was essentially no opening in this location therefore I had to come in from the left side.  I did use a 25-gauge 5 inch Quincke needle and advanced this into her left paramedian epidural interspace.  After negative aspiration for CSF and blood, 1.5 cc of Omnipaque contrast dye was injected revealing the appropriate epidurogram without evidence of intrathecal or intravascular spread. Following this, a 3-mL solution of 40 mg of Triamcinolone with 2 cc preservative-free normal saline was slowly injected.  No I wish I could have had a little bit more spread in her right epidural gutter but given the amount of degeneration I felt where I placed the medication was about as good as I could do.  She did have spread towards the right side but I would say the majority roughly 60% of the injected medication went to the left side.  After injection of the medication, as the needle tip was withdrawn, it was flushed with local anesthetic.  The patient was monitored with blood pressure and pulse oximetry machines with the assistance of an RN throughout the procedure.  The patient was alert and responsive to questions throughout the procedure.   The patient tolerated the procedure well and was observed in the post-procedural area.  The patient was dismissed without apparent complications.   DIAGNOSIS:  1.  Right-sided low back pain and lower extremity pain secondary to spinal stenosis at L3-4    PLAN:    1. Performed a lumbar interlaminar epidural steroid injection without complications.   2. The patient was instructed follow-up with the referring provider and to call the [facility] if today's procedure is not helpful.  At this point if today's injection is not helpful I would not recommend any further injections.  It does seem like most of her pain is located over the right SI joint but the SI joint injection was not beneficial to her.  The other finding noted on her MRI are  the Modic changes.  She may benefit from the Intracept procedure however her particular insurance right now is not covering that.  I recommended that once they do start covering that procedure that she considers that procedure given that there is little to no downside were bridges burned by trying that procedure.  I recommended that she has no further injections if today's procedure was not that helpful for her.  Instead I would wait until she is a candidate for the Intracept procedure.    Gilbert Mcclure MD  Diplomate of the American Board of Anesthesiology, Pain Medicine

## 2023-07-26 NOTE — PROGRESS NOTES
I was asked by Dr. Stinson to see this patient in consultation    81F w/ lumbar scoliosis, stenosis, severe back pain.  Several years of aching, axial back pain.  Notes 7/10 throbbing back pain with radiation to the right thigh, worse with activity and walking.   Did PT and BLANCA in 2018 with excellent relief.  Scans show scoliosis with marked right-kelley curvature, stenosis, and listhesis.       Past Medical History:   Diagnosis Date     Acute cholecystitis 3/23/2018     Acute kidney injury (H) 9/10/2017     Arthritis      Dehydration 9/4/2017     Diabetes type 2, controlled (H)      Elevated lactic acid level 9/10/2017     GERD (gastroesophageal reflux disease)      History of renal calculi      HTN, goal below 140/90      Hyperlipidaemia LDL goal < 100      Hypokalemia 9/3/2017     Hypothyroid      Insomnia      Left carotid stenosis      Migraine      Motion sickness      PONV (postoperative nausea and vomiting)      Sciatica of right side 6/28/2013     Type 2 diabetes mellitus without complication, without long-term current use of insulin (H) 2/16/2017     Past Surgical History:   Procedure Laterality Date     BUNIONECTOMY      Right foot     CATARACT IOL, RT/LT Bilateral 2017     COLONOSCOPY  7/12/2013    Procedure: COLONOSCOPY;  Colonoscopy;  Surgeon: Giovany Espinoza MD;  [facility]     COLONOSCOPY N/A 1/31/2019    Procedure: Combined Colonoscopy, Single Or Multiple Biopsy/Polypectomy By Biopsy;  Surgeon: Efren Osorio MD;  Location[facility]     COLONOSCOPY WITH CO2 INSUFFLATION N/A 1/31/2019    Procedure: COLONOSCOPY WITH CO2 INSUFFLATION;  Surgeon: Efren Osorio MD;  Location: [facility]     FRACTURE TX, ANKLE RT/LT      Left ankle     HC CYSTOURETHROSCOPY W/ URETEROSCOPY &/OR PYELOSCOPY; W/ LITHOTRIPSY       HC REVISE MEDIAN N/CARPAL TUNNEL SURG      Right wrist     INJECT EPIDURAL LUMBAR Right 12/27/2018    Procedure: INJECT EPIDURAL LUMBAR right foraminal narrowing  severe at L4-L5 and moderate at L5-S1;  Surgeon: Gilbert Mcclure MD;  [facility]     INJECT JOINT SACROILIAC  4/10/2014    Procedure: INJECT JOINT SACROILIAC;  Sacroiliac Joint Injection;  Surgeon: Gilbert Mcclure MD;  [facility]     INJECT JOINT SACROILIAC Left 4/11/2019    Procedure: INJECT JOINT SACROILIAC- LEFT;  Surgeon: Gilbert Mcclure MD;  [facility]     LAPAROSCOPIC CHOLECYSTECTOMY N/A 3/24/2018    Procedure: LAPAROSCOPIC CHOLECYSTECTOMY;  Laparoscopic Cholecystectomy;  Surgeon: Berry Allen DO;  Location: [facility]     Social History     Socioeconomic History     Marital status:      Spouse name: Not on file     Number of children: Not on file     Years of education: Not on file     Highest education level: Not on file   Occupational History     Employer: RETIRED     Comment: [employer] part time   Social Needs     Financial resource strain: Not on file     Food insecurity     Worry: Not on file     Inability: Not on file     Transportation needs     Medical: Not on file     Non-medical: Not on file   Tobacco Use     Smoking status: Never Smoker     Smokeless tobacco: Never Used   Substance and Sexual Activity     Alcohol use: Yes     Drug use: No     Sexual activity: Not Currently   Lifestyle     Physical activity     Days per week: Not on file     Minutes per session: Not on file     Stress: Not on file   Relationships     Social connections     Talks on phone: Not on file     Gets together: Not on file     Attends Sabianism service: Not on file     Active member of club or organization: Not on file     Attends meetings of clubs or organizations: Not on file     Relationship status: Not on file     Intimate partner violence     Fear of current or ex partner: Not on file     Emotionally abused: Not on file     Physically abused: Not on file     Forced sexual activity: Not on file   Other Topics Concern     Parent/sibling w/ CABG, MI or angioplasty before 65F 55M? Not  "Asked   Social History Narrative     Not on file     Family History   Problem Relation Age of Onset     Hypertension Brother      Cerebrovascular Disease Brother      Diabetes Father      Cardiovascular Father      Diabetes Brother         Borderline     Breast Cancer Mother      Diabetes Brother      Blood Disease Brother      Cardiovascular Brother         MI        ROS: 10 point ROS neg other than the symptoms noted above in the HPI.    Physical Exam  /72   Temp 97.6  F (36.4  C) (Temporal)   Ht 5' 6.6\" (1.692 m)   Wt 206 lb (93.4 kg)   LMP  (LMP Unknown)   BMI 32.65 kg/m    HEENT:  Normocephalic, atraumatic.  PERRLA.  EOM s intact.  Visual fields full to gross exam  Neck:  Supple, non-tender, without lymphadenopathy.  Heart:  No peripheral edema  Lungs:  No SOB  Abdomen:  Non-distended.   Skin:  Warm and dry.  Extremities:  No edema, cyanosis or clubbing.  Psychiatric:  No apparent distress  Musculoskeletal:  Normal bulk and tone    NEUROLOGICAL EXAMINATION:     Mental status:  Alert and Oriented x 3, speech is fluent.  Cranial nerves:  II-XII intact.   Motor:    Shoulder Abduction:  Right:  5/5   Left:  5/5  Biceps:                      Right:  5/5   Left:  5/5  Triceps:                     Right:  5/5   Left:  5/5  Wrist Extensors:       Right:  5/5   Left:  5/5  Wrist Flexors:           Right:  5/5   Left:  5/5  interosseus :            Right:  5/5   Left:  5/5  Hip Flexion:                Right: 5/5  Left:  5/5  Quadriceps:             Right:  5/5  Left:  5/5  Hamstrings:             Right:  5/5  Left:  5/5  Gastroc Soleus:        Right:  5/5  Left:  5/5  Tib/Ant:                      Right:  5/5  Left:  5/5  EHL:                     Right:  5/5  Left:  5/5  Sensation:  Intact  Reflexes:  Negative Babinski.  Negative Clonus.  Negative Clemente's.  Coordination:  Smooth finger to nose testing.   Negative pronator drift.  Smooth tandem walking.    A/P:  81F w/ lumbar scoliosis, stenosis, severe back " pain    I had a discussion with the patient, reviewing the history, symptoms, and imaging  We discussed that she would not want major surgery under any circumstances  Will order PT  Will re-order BLANCA  Referral to Dr. Sheriff for management      Yes - the patient is able to be screened

## 2023-07-29 NOTE — PATIENT INSTRUCTIONS
Ordered a right L4-5 & L5-S1 transforaminal Epidural Steroid Injection at [facility]. [facility] will call you within 48 hours to schedule this. If you don't here from them, please schedule by calling Operating Room scheduling team s phone number: [phone], option 2. If symptoms continue 2 weeks after injections, call our clinic and talk to a nurse.    Ordered a MRI and scoliosis x-rays . They will call to schedule. If the don't call within 2 days, call [place]: [phone]  [phone]        You should schedule this within one week. We will call you with the results. If you don't hear from us 7 days after the scan, please call us.      [facility]  [facility] - [address] [address]  Telephone:  [phone]       Fax:  [phone]     No

## 2023-08-08 ENCOUNTER — TELEPHONE (OUTPATIENT)
Dept: FAMILY MEDICINE | Facility: OTHER | Age: 83
End: 2023-08-08
Payer: COMMERCIAL

## 2023-08-08 DIAGNOSIS — E78.5 HYPERLIPIDEMIA WITH TARGET LDL LESS THAN 100: ICD-10-CM

## 2023-08-08 NOTE — TELEPHONE ENCOUNTER
Medication Question or Refill pharmacy was out of cholestyramine (QUESTRAN) 4 g packet. So they gave her a different brand. She can't get it to dissolve. She is wondering if you could prescribe something else until the pharmacy gets the regular one back in.    Contacts         Type Contact Phone/Fax    08/08/2023 10:49 AM CDT Phone (Incoming) Mya Hui (Self) [phone] (M)            What medication are you calling about (include dose and sig)?: cholestyramine (QUESTRAN) 4 g packet     Preferred Pharmacy:   [id number] [address] [place] [address]  [address] [place] [address]  Phone: [phone] Fax: [phone]      Controlled Substance Agreement on file:   CSA -- Patient Level:    CSA: None found at the patient level.       Who prescribed the medication?: Kisha Stinson    Do you need a refill? no    When did you use the medication last?     Patient offered an appointment? No    Do you have any questions or concerns?  Yes: , She can't get it to dissolve. She is wondering if you could prescribe something else until the pharmacy gets the regular one back in.      Okay to leave a detailed message?: Yes at Home number on file [phone] (home)

## 2023-08-09 NOTE — TELEPHONE ENCOUNTER
She could try putting it in the fridge, sometimes that helps things dissolve.     If it is only going to be a week or so, she can just go without as it will not change things too much in that time.     Carlos Stinson PA-C  [facility]

## 2023-08-26 NOTE — TELEPHONE ENCOUNTER
Prescription approved per [facility] Refill Protocol.    Dacia Serrato RN on 3/24/2021 at 4:57 PM     Patient/Caregiver provided printed discharge information.

## 2023-09-14 ENCOUNTER — OFFICE VISIT (OUTPATIENT)
Dept: NEUROSURGERY | Facility: CLINIC | Age: 83
End: 2023-09-14
Payer: COMMERCIAL

## 2023-09-14 ENCOUNTER — TELEPHONE (OUTPATIENT)
Dept: ANESTHESIOLOGY | Facility: CLINIC | Age: 83
End: 2023-09-14

## 2023-09-14 VITALS
DIASTOLIC BLOOD PRESSURE: 86 MMHG | BODY MASS INDEX: 36.54 KG/M2 | WEIGHT: 214 LBS | HEART RATE: 77 BPM | SYSTOLIC BLOOD PRESSURE: 136 MMHG | TEMPERATURE: 97.1 F | HEIGHT: 64 IN

## 2023-09-14 DIAGNOSIS — M81.0 AGE-RELATED OSTEOPOROSIS WITHOUT CURRENT PATHOLOGICAL FRACTURE: ICD-10-CM

## 2023-09-14 DIAGNOSIS — M41.56 OTHER SECONDARY SCOLIOSIS, LUMBAR REGION: Primary | ICD-10-CM

## 2023-09-14 PROCEDURE — 99213 OFFICE O/P EST LOW 20 MIN: CPT | Performed by: NEUROLOGICAL SURGERY

## 2023-09-14 ASSESSMENT — PAIN SCALES - GENERAL: PAINLEVEL: MILD PAIN (2)

## 2023-09-14 NOTE — TELEPHONE ENCOUNTER
[facility] Call Center    Phone Message    May a detailed message be left on voicemail: yes     Reason for Call: Mansi an RN with Neurosurgery would like to know which of our providers can prescribe medical canabis. She is requesting a message via Teams.  Search Mansi Quinonez and send her a message.

## 2023-09-14 NOTE — PROGRESS NOTES
81F w/ lumbar scoliosis, stenosis, severe back pain.  Several years of aching, axial back pain.  Notes 7/10 throbbing back pain with radiation to the right thigh, worse with activity and walking.   Did PT and BLANCA in 2018 with excellent relief.  Scans show scoliosis with marked right-kelley curvature, stenosis, and listhesis.    Returns for follow up.  Continued severe right lower back and leg pain.  Underwent BLANCA this summer without improvement.  She saw Dr. Choe for eval who didn't note any orthopedic issues.  New MR Lumbar, personally reviewed, shows marked scoliosis, multiple levels of severe foraminal stenosis, and L3-4 moderate/severe central and lateral recess stenosis.    Returns for follow up.  Continued severe right leg pain.  Has been needing a walker and has difficulty even standing in the kitchen.  SI joint injection didn't help.  Has been doing PT with only mild improvement.    Returns for follow up.  Did another BLANCA without improvement.  Right leg has given out on her a couple times.  EMG had shown L5 and S1 radiculopathy.  New MR with stable severe central and foraminal stenosis.  XR Scoliosis shows ~50 degree coronal lumbar scoliosis.  When we compared this back to XRs in 2013, has dramatically progressed.    Past Medical History:   Diagnosis Date    Acute cholecystitis 3/23/2018    Acute kidney injury (H) 9/10/2017    Arthritis     Dehydration 9/4/2017    Diabetes type 2, controlled (H)     Elevated lactic acid level 9/10/2017    GERD (gastroesophageal reflux disease)     History of renal calculi     HTN, goal below 140/90     Hyperlipidaemia LDL goal < 100     Hypokalemia 9/3/2017    Hypothyroid     Insomnia     Left carotid stenosis     Migraine     Motion sickness     PONV (postoperative nausea and vomiting)     Sciatica of right side 6/28/2013    Type 2 diabetes mellitus without complication, without long-term current use of insulin (H) 2/16/2017     Past Surgical History:   Procedure  Laterality Date    BUNIONECTOMY      Right foot    CATARACT IOL, RT/LT Bilateral 2017    COLONOSCOPY  7/12/2013    Procedure: COLONOSCOPY;  Colonoscopy;  Surgeon: Giovany Espinoza MD;  [facility]    COLONOSCOPY N/A 1/31/2019    Procedure: Combined Colonoscopy, Single Or Multiple Biopsy/Polypectomy By Biopsy;  Surgeon: Efren Osorio MD;  [facility]    COLONOSCOPY WITH CO2 INSUFFLATION N/A 1/31/2019    Procedure: COLONOSCOPY WITH CO2 INSUFFLATION;  Surgeon: Efren Osorio MD;  [facility]    FRACTURE TX, ANKLE RT/LT      Left ankle    HC CYSTOURETHROSCOPY W/ URETEROSCOPY &/OR PYELOSCOPY; W/ LITHOTRIPSY      HC REVISE MEDIAN N/CARPAL TUNNEL SURG      Right wrist    INJECT EPIDURAL LUMBAR Right 12/27/2018    Procedure: INJECT EPIDURAL LUMBAR right foraminal narrowing severe at L4-L5 and moderate at L5-S1;  Surgeon: Gilbert Mcclure MD;  [facility]    INJECT EPIDURAL TRANSFORAMINAL Bilateral 5/27/2021    Procedure: Bilateral Lumbar 5-sacral 1 Epidural steroid injection;  Surgeon: Gilbert Mcclure MD;  [facility]    INJECT EPIDURAL TRANSFORAMINAL Right 6/17/2022    Procedure: Attempted right Lumbar 4-5 and Lumbar 5-Sacral 1 epidural injections with completion of only the right Lumbar 5-Sacral1 Transforaminal Epidural Steroid Injection with fluoroscopic guidance and contrast;  Surgeon: Gilbert Mcclure MD;  [facility]    INJECT EPIDURAL TRANSFORAMINAL Right 7/13/2023    Procedure: Lumbar 3-4 interlaminar epidural steroid injection using fluoroscopic guidance with contrast dye, Right;  Surgeon: Gilbert Mcclure MD;  [facility]    INJECT JOINT SACROILIAC  4/10/2014    Procedure: INJECT JOINT SACROILIAC;  Sacroiliac Joint Injection;  Surgeon: Gilbert Mcclure MD;  [facility]    INJECT JOINT SACROILIAC Left 4/11/2019    Procedure: INJECT JOINT SACROILIAC- LEFT;  Surgeon: Gilbert Mcclure MD;  [facility]    INJECT JOINT SACROILIAC Right 3/3/2023     "Procedure: Fluoroscopically-guided injection into the Right Sacroiliac joint;  Surgeon: Gilbert Mcclure MD;  [facility]    LAPAROSCOPIC CHOLECYSTECTOMY N/A 3/24/2018    Procedure: LAPAROSCOPIC CHOLECYSTECTOMY;  Laparoscopic Cholecystectomy;  Surgeon: Berry Allen DO;  [facility]     Social History     Socioeconomic History    Marital status:      Spouse name: Not on file    Number of children: Not on file    Years of education: Not on file    Highest education level: Not on file   Occupational History     Employer: RETIRED     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 Not Asked   Social History Narrative    Not on file     Social Determinants of Health     Financial Resource Strain: Not on file   Food Insecurity: Not on file   Transportation Needs: Not on file   Physical Activity: Not on file   Stress: Not on file   Social Connections: Not on file   Intimate Partner Violence: Not on file   Housing Stability: Not on file     Family History   Problem Relation Age of Onset    Hypertension Brother     Cerebrovascular Disease Brother     Diabetes Father     Cardiovascular Father     Diabetes Brother         Borderline    Breast Cancer Mother     Diabetes Brother     Blood Disease Brother     Cardiovascular Brother         MI        ROS: 10 point ROS neg other than the symptoms noted above in the HPI.    Physical Exam  /86   Pulse 77   Temp 97.1  F (36.2  C) (Temporal)   Ht 1.626 m (5' 4\")   Wt 97.1 kg (214 lb)   LMP  (LMP Unknown)   BMI 36.73 kg/m    HEENT:  Normocephalic, atraumatic.  PERRLA.  EOM s intact.  Visual fields full to gross exam  Neck:  Supple, non-tender, without " lymphadenopathy.  Heart:  No peripheral edema  Lungs:  No SOB  Abdomen:  Non-distended.   Skin:  Warm and dry.  Extremities:  No edema, cyanosis or clubbing.  Psychiatric:  No apparent distress  Musculoskeletal:  Normal bulk and tone    NEUROLOGICAL EXAMINATION:     Mental status:  Alert and Oriented x 3, speech is fluent.  Cranial nerves:  II-XII intact.   Motor:    Shoulder Abduction:  Right:  5/5   Left:  5/5  Biceps:                      Right:  5/5   Left:  5/5  Triceps:                     Right:  5/5   Left:  5/5  Wrist Extensors:       Right:  5/5   Left:  5/5  Wrist Flexors:           Right:  5/5   Left:  5/5  interosseus :            Right:  5/5   Left:  5/5  Hip Flexion:                Right: 5/5  Left:  5/5  Quadriceps:             Right:  5/5  Left:  5/5  Hamstrings:             Right:  5/5  Left:  5/5  Gastroc Soleus:        Right:  5/5  Left:  5/5  Tib/Ant:                      Right:  5/5  Left:  5/5  EHL:                     Right:  5/5  Left:  5/5  Sensation:  Intact  Reflexes:  Negative Babinski.  Negative Clonus.  Negative Clemente's.  Coordination:  Smooth finger to nose testing.   Negative pronator drift.  Requires a walker and difficulty standing.    A/P:  81F w/ lumbar scoliosis, stenosis, severe back pain    Will obtain DEXA scan  Pain clinic referral  Pending progress, may need to consider surgery for scoliosis correction

## 2023-09-14 NOTE — PROGRESS NOTES
Called patient and updated her that [facility] referral placed for medical marijuana evaluation. Provided her the scheduling phone number.    Phone: [phone]        Faxed the referral to  [phone]. Verified via [employer].    Mansi Quinonez RN on 9/14/2023 at 4:41 PM

## 2023-09-14 NOTE — LETTER
"    9/14/2023         RE: Mya Hui  [address]  [facility] [address]        Dear Colleague,    Thank you for referring your patient, Mya Hui, to the [facility] [place]. Please see a copy of my visit note below.    Mya Hui is a 83 year old female who presents for:  Chief Complaint   Patient presents with     Follow Up     Lumbar radiculopathy         Initial Vitals:  /86   Pulse 77   Temp 97.1  F (36.2  C) (Temporal)   Ht 5' 4\" (1.626 m)   Wt 214 lb (97.1 kg)   LMP  (LMP Unknown)   BMI 36.73 kg/m   Estimated body mass index is 36.73 kg/m  as calculated from the following:    Height as of this encounter: 5' 4\" (1.626 m).    Weight as of this encounter: 214 lb (97.1 kg).. Body surface area is 2.09 meters squared. BP completed using cuff size: large  Mild Pain (2)    Nursing Comments: Lumbar radiculopathy     Suki Hurt, CMA      81F w/ lumbar scoliosis, stenosis, severe back pain.  Several years of aching, axial back pain.  Notes 7/10 throbbing back pain with radiation to the right thigh, worse with activity and walking.   Did PT and BLANCA in 2018 with excellent relief.  Scans show scoliosis with marked right-kelley curvature, stenosis, and listhesis.    Returns for follow up.  Continued severe right lower back and leg pain.  Underwent BLANCA this summer without improvement.  She saw Dr. Choe for eval who didn't note any orthopedic issues.  New MR Lumbar, personally reviewed, shows marked scoliosis, multiple levels of severe foraminal stenosis, and L3-4 moderate/severe central and lateral recess stenosis.    Returns for follow up.  Continued severe right leg pain.  Has been needing a walker and has difficulty even standing in the kitchen.  SI joint injection didn't help.  Has been doing PT with only mild improvement.    Returns for follow up.  Did another BLANCA without improvement.  Right leg has given out on her a couple times.  EMG " had shown L5 and S1 radiculopathy.  New MR with stable severe central and foraminal stenosis.  XR Scoliosis shows ~50 degree coronal lumbar scoliosis.  When we compared this back to XRs in 2013, has dramatically progressed.    Past Medical History:   Diagnosis Date     Acute cholecystitis 3/23/2018     Acute kidney injury (H) 9/10/2017     Arthritis      Dehydration 9/4/2017     Diabetes type 2, controlled (H)      Elevated lactic acid level 9/10/2017     GERD (gastroesophageal reflux disease)      History of renal calculi      HTN, goal below 140/90      Hyperlipidaemia LDL goal < 100      Hypokalemia 9/3/2017     Hypothyroid      Insomnia      Left carotid stenosis      Migraine      Motion sickness      PONV (postoperative nausea and vomiting)      Sciatica of right side 6/28/2013     Type 2 diabetes mellitus without complication, without long-term current use of insulin (H) 2/16/2017     Past Surgical History:   Procedure Laterality Date     BUNIONECTOMY      Right foot     CATARACT IOL, RT/LT Bilateral 2017     COLONOSCOPY  7/12/2013    Procedure: COLONOSCOPY;  Colonoscopy;  Surgeon: Giovany Espinoza MD;  [facility]     COLONOSCOPY N/A 1/31/2019    Procedure: Combined Colonoscopy, Single Or Multiple Biopsy/Polypectomy By Biopsy;  Surgeon: Efren Osorio MD;  [facility]     COLONOSCOPY WITH CO2 INSUFFLATION N/A 1/31/2019    Procedure: COLONOSCOPY WITH CO2 INSUFFLATION;  Surgeon: Efren Osorio MD;  [facility]     FRACTURE TX, ANKLE RT/LT      Left ankle     HC CYSTOURETHROSCOPY W/ URETEROSCOPY &/OR PYELOSCOPY; W/ LITHOTRIPSY       HC REVISE MEDIAN N/CARPAL TUNNEL SURG      Right wrist     INJECT EPIDURAL LUMBAR Right 12/27/2018    Procedure: INJECT EPIDURAL LUMBAR right foraminal narrowing severe at L4-L5 and moderate at L5-S1;  Surgeon: Gilbert Mcclure MD;  [facility]     INJECT EPIDURAL TRANSFORAMINAL Bilateral 5/27/2021    Procedure: Bilateral Lumbar  5-sacral 1 Epidural steroid injection;  Surgeon: Gilbert Mcclure MD;  [facility]     INJECT EPIDURAL TRANSFORAMINAL Right 6/17/2022    Procedure: Attempted right Lumbar 4-5 and Lumbar 5-Sacral 1 epidural injections with completion of only the right Lumbar 5-Sacral1 Transforaminal Epidural Steroid Injection with fluoroscopic guidance and contrast;  Surgeon: Gilbert Mcclure MD;  [facility]     INJECT EPIDURAL TRANSFORAMINAL Right 7/13/2023    Procedure: Lumbar 3-4 interlaminar epidural steroid injection using fluoroscopic guidance with contrast dye, Right;  Surgeon: Gilbert Mcclure MD;  [facility]     INJECT JOINT SACROILIAC  4/10/2014    Procedure: INJECT JOINT SACROILIAC;  Sacroiliac Joint Injection;  Surgeon: Gilbert Mcclure MD;  [facility]     INJECT JOINT SACROILIAC Left 4/11/2019    Procedure: INJECT JOINT SACROILIAC- LEFT;  Surgeon: Gilbert Mcclure MD;  [facility]     INJECT JOINT SACROILIAC Right 3/3/2023    Procedure: Fluoroscopically-guided injection into the Right Sacroiliac joint;  Surgeon: Gilbert Mcclure MD;  [facility]     LAPAROSCOPIC CHOLECYSTECTOMY N/A 3/24/2018    Procedure: LAPAROSCOPIC CHOLECYSTECTOMY;  Laparoscopic Cholecystectomy;  Surgeon: Berry Allen DO;  [facility]: [facility]     Social History     Socioeconomic History     Marital status:      Spouse name: Not on file     Number of children: Not on file     Years of education: Not on file     Highest education level: Not on file   Occupational History     Employer: RETIRED     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 Not Asked   Social History Narrative     Not on file     Social Determinants of Health  "    Financial Resource Strain: Not on file   Food Insecurity: Not on file   Transportation Needs: Not on file   Physical Activity: Not on file   Stress: Not on file   Social Connections: Not on file   Intimate Partner Violence: Not on file   Housing Stability: Not on file     Family History   Problem Relation Age of Onset     Hypertension Brother      Cerebrovascular Disease Brother      Diabetes Father      Cardiovascular Father      Diabetes Brother         Borderline     Breast Cancer Mother      Diabetes Brother      Blood Disease Brother      Cardiovascular Brother         MI        ROS: 10 point ROS neg other than the symptoms noted above in the HPI.    Physical Exam  /86   Pulse 77   Temp 97.1  F (36.2  C) (Temporal)   Ht 1.626 m (5' 4\")   Wt 97.1 kg (214 lb)   LMP  (LMP Unknown)   BMI 36.73 kg/m    HEENT:  Normocephalic, atraumatic.  PERRLA.  EOM s intact.  Visual fields full to gross exam  Neck:  Supple, non-tender, without lymphadenopathy.  Heart:  No peripheral edema  Lungs:  No SOB  Abdomen:  Non-distended.   Skin:  Warm and dry.  Extremities:  No edema, cyanosis or clubbing.  Psychiatric:  No apparent distress  Musculoskeletal:  Normal bulk and tone    NEUROLOGICAL EXAMINATION:     Mental status:  Alert and Oriented x 3, speech is fluent.  Cranial nerves:  II-XII intact.   Motor:    Shoulder Abduction:  Right:  5/5   Left:  5/5  Biceps:                      Right:  5/5   Left:  5/5  Triceps:                     Right:  5/5   Left:  5/5  Wrist Extensors:       Right:  5/5   Left:  5/5  Wrist Flexors:           Right:  5/5   Left:  5/5  interosseus :            Right:  5/5   Left:  5/5  Hip Flexion:                Right: 5/5  Left:  5/5  Quadriceps:             Right:  5/5  Left:  5/5  Hamstrings:             Right:  5/5  Left:  5/5  Gastroc Soleus:        Right:  5/5  Left:  5/5  Tib/Ant:                      Right:  5/5  Left:  5/5  EHL:                     Right:  5/5  Left:  " 5/5  Sensation:  Intact  Reflexes:  Negative Babinski.  Negative Clonus.  Negative Clemente's.  Coordination:  Smooth finger to nose testing.   Negative pronator drift.  Requires a walker and difficulty standing.    A/P:  81F w/ lumbar scoliosis, stenosis, severe back pain    Will obtain DEXA scan  Pain clinic referral  Pending progress, may need to consider surgery for scoliosis correction      Again, thank you for allowing me to participate in the care of your patient.        Sincerely,        Mumtaz Waite MD

## 2023-09-14 NOTE — PROGRESS NOTES
"Mya Hui is a 83 year old female who presents for:  Chief Complaint   Patient presents with    Follow Up     Lumbar radiculopathy         Initial Vitals:  /86   Pulse 77   Temp 97.1  F (36.2  C) (Temporal)   Ht 5' 4\" (1.626 m)   Wt 214 lb (97.1 kg)   LMP  (LMP Unknown)   BMI 36.73 kg/m   Estimated body mass index is 36.73 kg/m  as calculated from the following:    Height as of this encounter: 5' 4\" (1.626 m).    Weight as of this encounter: 214 lb (97.1 kg).. Body surface area is 2.09 meters squared. BP completed using cuff size: large  Mild Pain (2)    Nursing Comments: Lumbar radiculopathy     Suki Hurt CMA    "

## 2023-09-14 NOTE — CONFIDENTIAL NOTE
"RN reviewed request. Unable to find BALAJI Nazario on  Physicians Teams or Secure chat. Writer reviewed note. There are no \"Tier 1\" providers at the [facility] that prescribe medical cannabis. There are [facility] site pain clinics that have providers that are \"Tier 1\" that may prescribe. A referral would be needed to schedule appropriately.      Sammie Phelan RN    "

## 2023-09-14 NOTE — PATIENT INSTRUCTIONS
Ordered a dexa scan. They will call to schedule. If the don't call within 2 days, call [place]: [phone]  [phone]        You should schedule this within one week. We will call you with the results. If you don't hear from us 7 days after the scan, please call us.    Referral to Pain Management for marijuana as treatment. I will look into clinic and call you later this afternoon.    [facility]  [facility] - [address] [address]  Telephone:  [phone]       Fax:  [phone]

## 2023-09-15 NOTE — PROGRESS NOTES
Called patient and informed her that the DEXA scan is open and she should be fine considering her claustrophobia.    Mansi Quinonez RN on 9/15/2023 at 5:14 PM

## 2023-09-21 ENCOUNTER — HOSPITAL ENCOUNTER (OUTPATIENT)
Dept: BONE DENSITY | Facility: CLINIC | Age: 83
Discharge: HOME | End: 2023-09-21
Attending: NEUROLOGICAL SURGERY | Admitting: NEUROLOGICAL SURGERY
Payer: COMMERCIAL

## 2023-09-21 ENCOUNTER — TELEPHONE (OUTPATIENT)
Dept: NEUROSURGERY | Facility: CLINIC | Age: 83
End: 2023-09-21
Payer: COMMERCIAL

## 2023-09-21 DIAGNOSIS — M81.0 AGE-RELATED OSTEOPOROSIS WITHOUT CURRENT PATHOLOGICAL FRACTURE: ICD-10-CM

## 2023-09-21 PROCEDURE — 77080 DXA BONE DENSITY AXIAL: CPT

## 2023-09-21 NOTE — PROGRESS NOTES
Appropriate assistive devices provided during their visit. yes (Yes, No, N/A) walker (list device)    Exam table and/or cart  placed in the lowest position. yes (Yes, No, N/A)    Brakes on tables/carts/wheelchairs used at all times. yes (Yes, No, N/A)    Non slip footwear applied. No, shoes on (Yes, No, NA)    Patient was accompanied by staff throughout visit. yes (Yes, No, N/A)    Equipment safety straps used. na (Yes, No, N/A)    Assist with toileting. na (Yes, No, N/A)

## 2023-09-21 NOTE — TELEPHONE ENCOUNTER
No high grade stenosis seen on Dexa. Could be a candidate for surgery.    Attempted to reach out to patient, no answer. Left voice message for them to call clinic back to further discuss.

## 2023-09-26 ENCOUNTER — TELEPHONE (OUTPATIENT)
Dept: NEUROSURGERY | Facility: CLINIC | Age: 83
End: 2023-09-26
Payer: COMMERCIAL

## 2023-09-26 NOTE — TELEPHONE ENCOUNTER
Patient returned call. Requesting a call back from someone from Dr. Waite's team.    Call back # [phone]

## 2023-09-27 ENCOUNTER — TRANSFERRED RECORDS (OUTPATIENT)
Dept: HEALTH INFORMATION MANAGEMENT | Facility: CLINIC | Age: 83
End: 2023-09-27
Payer: COMMERCIAL

## 2023-09-27 NOTE — TELEPHONE ENCOUNTER
Patient with recent Dexascan, negative for high grade osteoporosis. As per Dr Waite, could proceed with surgery if she chooses.   Called patient and updated. She would like to speak with Dr Waite more about surgery.   Appointment scheduled.

## 2023-09-28 ENCOUNTER — OFFICE VISIT (OUTPATIENT)
Dept: NEUROSURGERY | Facility: CLINIC | Age: 83
End: 2023-09-28
Payer: COMMERCIAL

## 2023-09-28 VITALS
DIASTOLIC BLOOD PRESSURE: 74 MMHG | TEMPERATURE: 97.2 F | OXYGEN SATURATION: 94 % | BODY MASS INDEX: 36.54 KG/M2 | HEIGHT: 64 IN | HEART RATE: 74 BPM | SYSTOLIC BLOOD PRESSURE: 136 MMHG | WEIGHT: 214 LBS

## 2023-09-28 DIAGNOSIS — M41.56 OTHER SECONDARY SCOLIOSIS, LUMBAR REGION: Primary | ICD-10-CM

## 2023-09-28 PROCEDURE — 99214 OFFICE O/P EST MOD 30 MIN: CPT | Performed by: NEUROLOGICAL SURGERY

## 2023-09-28 ASSESSMENT — PAIN SCALES - GENERAL: PAINLEVEL: WORST PAIN (10)

## 2023-09-28 NOTE — LETTER
9/28/2023         RE: Mya Hui  [address]  [place] [address]        Dear Colleague,    Thank you for referring your patient, Mya Hui, to the [facility] [place]. Please see a copy of my visit note below.    81F w/ lumbar scoliosis, stenosis, severe back pain.  Several years of aching, axial back pain.  Notes 7/10 throbbing back pain with radiation to the right thigh, worse with activity and walking.   Did PT and BLACNA in 2018 with excellent relief.  Scans show scoliosis with marked right-kelley curvature, stenosis, and listhesis.    Returns for follow up.  Continued severe right lower back and leg pain.  Underwent BLANCA this summer without improvement.  She saw Dr. Choe for eval who didn't note any orthopedic issues.  New MR Lumbar, personally reviewed, shows marked scoliosis, multiple levels of severe foraminal stenosis, and L3-4 moderate/severe central and lateral recess stenosis.    Returns for follow up.  Continued severe right leg pain.  Has been needing a walker and has difficulty even standing in the kitchen.  SI joint injection didn't help.  Has been doing PT with only mild improvement.    Returns for follow up.  Did another BLANCA without improvement.  Right leg has given out on her a couple times.  EMG had shown L5 and S1 radiculopathy.  New MR with stable severe central and foraminal stenosis.  XR Scoliosis shows ~50 degree coronal lumbar scoliosis.  When we compared this back to XRs in 2013, has dramatically progressed.    Returns for follow up.  Continued pain as above.  DEXA showed osteopenia with normal Z score.  She saw pain clinic who noted that she might consider medicinal marijuana prescription if they decide not to proceed with surgery.    Past Medical History:   Diagnosis Date     Acute cholecystitis 3/23/2018     Acute kidney injury (H) 9/10/2017     Arthritis      Dehydration 9/4/2017     Diabetes type 2, controlled (H)       Elevated lactic acid level 9/10/2017     GERD (gastroesophageal reflux disease)      History of renal calculi      HTN, goal below 140/90      Hyperlipidaemia LDL goal < 100      Hypokalemia 9/3/2017     Hypothyroid      Insomnia      Left carotid stenosis      Migraine      Motion sickness      PONV (postoperative nausea and vomiting)      Sciatica of right side 6/28/2013     Type 2 diabetes mellitus without complication, without long-term current use of insulin (H) 2/16/2017     Past Surgical History:   Procedure Laterality Date     BUNIONECTOMY      Right foot     CATARACT IOL, RT/LT Bilateral 2017     COLONOSCOPY  7/12/2013    Procedure: COLONOSCOPY;  Colonoscopy;  Surgeon: Giovany Espinoza MD;  [facility]     COLONOSCOPY N/A 1/31/2019    Procedure: Combined Colonoscopy, Single Or Multiple Biopsy/Polypectomy By Biopsy;  Surgeon: Efren Osorio MD;  [facility]     COLONOSCOPY WITH CO2 INSUFFLATION N/A 1/31/2019    Procedure: COLONOSCOPY WITH CO2 INSUFFLATION;  Surgeon: Efren Osorio MD;  [facility]: [facility]     FRACTURE TX, ANKLE RT/LT      Left ankle     HC CYSTOURETHROSCOPY W/ URETEROSCOPY &/OR PYELOSCOPY; W/ LITHOTRIPSY       HC REVISE MEDIAN N/CARPAL TUNNEL SURG      Right wrist     INJECT EPIDURAL LUMBAR Right 12/27/2018    Procedure: INJECT EPIDURAL LUMBAR right foraminal narrowing severe at L4-L5 and moderate at L5-S1;  Surgeon: Gilbert Mcclure MD;  [facility]     INJECT EPIDURAL TRANSFORAMINAL Bilateral 5/27/2021    Procedure: Bilateral Lumbar 5-sacral 1 Epidural steroid injection;  Surgeon: Gilbert Mcclure MD;  [facility]     INJECT EPIDURAL TRANSFORAMINAL Right 6/17/2022    Procedure: Attempted right Lumbar 4-5 and Lumbar 5-Sacral 1 epidural injections with completion of only the right Lumbar 5-Sacral1 Transforaminal Epidural Steroid Injection with fluoroscopic guidance and contrast;  Surgeon: Gilbert Mcclure MD;  [facility]     INJECT EPIDURAL  TRANSFORAMINAL Right 7/13/2023    Procedure: Lumbar 3-4 interlaminar epidural steroid injection using fluoroscopic guidance with contrast dye, Right;  Surgeon: Gilbert Mcclure MD;  [facility]     INJECT JOINT SACROILIAC  4/10/2014    Procedure: INJECT JOINT SACROILIAC;  Sacroiliac Joint Injection;  Surgeon: Gilbert Mcclure MD;  [facility]     INJECT JOINT SACROILIAC Left 4/11/2019    Procedure: INJECT JOINT SACROILIAC- LEFT;  Surgeon: Gilbert Mcclure MD;  [facility]     INJECT JOINT SACROILIAC Right 3/3/2023    Procedure: Fluoroscopically-guided injection into the Right Sacroiliac joint;  Surgeon: Gilbert Mcclure MD;  [facility]     LAPAROSCOPIC CHOLECYSTECTOMY N/A 3/24/2018    Procedure: LAPAROSCOPIC CHOLECYSTECTOMY;  Laparoscopic Cholecystectomy;  Surgeon: Berry Allen DO;  [facility]: [facility]     Social History     Socioeconomic History     Marital status:      Spouse name: Not on file     Number of children: Not on file     Years of education: Not on file     Highest education level: Not on file   Occupational History     Employer: RETIRED     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 Not Asked   Social History Narrative     Not on file     Social Determinants of Health     Financial Resource Strain: Not on file   Food Insecurity: Not on file   Transportation Needs: Not on file   Physical Activity: Not on file   Stress: Not on file   Social Connections: Not on file   Interpersonal Safety: Not on file   Housing Stability: Not on file     Family History   Problem Relation Age of Onset     Hypertension Brother      Cerebrovascular Disease Brother      Diabetes Father      Cardiovascular Father  "     Diabetes Brother         Borderline     Breast Cancer Mother      Diabetes Brother      Blood Disease Brother      Cardiovascular Brother         MI        ROS: 10 point ROS neg other than the symptoms noted above in the HPI.    Physical Exam  /74   Pulse 74   Temp 97.2  F (36.2  C) (Temporal)   Ht 1.626 m (5' 4\")   Wt 97.1 kg (214 lb)   LMP  (LMP Unknown)   SpO2 94%   BMI 36.73 kg/m    HEENT:  Normocephalic, atraumatic.  PERRLA.  EOM s intact.  Visual fields full to gross exam  Neck:  Supple, non-tender, without lymphadenopathy.  Heart:  No peripheral edema  Lungs:  No SOB  Abdomen:  Non-distended.   Skin:  Warm and dry.  Extremities:  No edema, cyanosis or clubbing.  Psychiatric:  No apparent distress  Musculoskeletal:  Normal bulk and tone    NEUROLOGICAL EXAMINATION:     Mental status:  Alert and Oriented x 3, speech is fluent.  Cranial nerves:  II-XII intact.   Motor:    Shoulder Abduction:  Right:  5/5   Left:  5/5  Biceps:                      Right:  5/5   Left:  5/5  Triceps:                     Right:  5/5   Left:  5/5  Wrist Extensors:       Right:  5/5   Left:  5/5  Wrist Flexors:           Right:  5/5   Left:  5/5  interosseus :            Right:  5/5   Left:  5/5  Hip Flexion:                Right: 5/5  Left:  5/5  Quadriceps:             Right:  5/5  Left:  5/5  Hamstrings:             Right:  5/5  Left:  5/5  Gastroc Soleus:        Right:  5/5  Left:  5/5  Tib/Ant:                      Right:  5/5  Left:  5/5  EHL:                     Right:  5/5  Left:  5/5  Sensation:  Intact  Reflexes:  Negative Babinski.  Negative Clonus.  Negative Clemente's.  Coordination:  Smooth finger to nose testing.   Negative pronator drift.  Requires a walker and difficulty standing.    A/P:  81F w/ lumbar scoliosis, stenosis, severe back pain    Discussed that surgery would entail T11-Pelvis decompression and fusion  We had an extensive discussion of risks and benefits discussed including infection, " hematoma, CSF leak, nerve root injury, pseudoarthrosis, hardware failure, and medical complications including MI, CVA, DVT, and PE  They will have further family discussion and let us know if they decide to proceed with surgery      Again, thank you for allowing me to participate in the care of your patient.        Sincerely,        Mumtaz Waite MD

## 2023-09-28 NOTE — NURSING NOTE
"Mya Hui is a 83 year old female who presents for:  Chief Complaint   Patient presents with    Neurologic Problem     Lumbar Radiculopathy     Results     Dexa Results         Initial Vitals:  /74   Pulse 74   Temp 97.2  F (36.2  C) (Temporal)   Ht 5' 4\" (1.626 m)   Wt 214 lb (97.1 kg)   LMP  (LMP Unknown)   SpO2 94%   BMI 36.73 kg/m   Estimated body mass index is 36.73 kg/m  as calculated from the following:    Height as of this encounter: 5' 4\" (1.626 m).    Weight as of this encounter: 214 lb (97.1 kg).. Body surface area is 2.09 meters squared. BP completed using cuff size: large  Worst Pain (10)    Nursing Comments:     Joyce Contreras    "

## 2023-09-29 NOTE — PROGRESS NOTES
81F w/ lumbar scoliosis, stenosis, severe back pain.  Several years of aching, axial back pain.  Notes 7/10 throbbing back pain with radiation to the right thigh, worse with activity and walking.   Did PT and BLANCA in 2018 with excellent relief.  Scans show scoliosis with marked right-kelley curvature, stenosis, and listhesis.    Returns for follow up.  Continued severe right lower back and leg pain.  Underwent BLANCA this summer without improvement.  She saw Dr. Choe for eval who didn't note any orthopedic issues.  New MR Lumbar, personally reviewed, shows marked scoliosis, multiple levels of severe foraminal stenosis, and L3-4 moderate/severe central and lateral recess stenosis.    Returns for follow up.  Continued severe right leg pain.  Has been needing a walker and has difficulty even standing in the kitchen.  SI joint injection didn't help.  Has been doing PT with only mild improvement.    Returns for follow up.  Did another BLANCA without improvement.  Right leg has given out on her a couple times.  EMG had shown L5 and S1 radiculopathy.  New MR with stable severe central and foraminal stenosis.  XR Scoliosis shows ~50 degree coronal lumbar scoliosis.  When we compared this back to XRs in 2013, has dramatically progressed.    Returns for follow up.  Continued pain as above.  DEXA showed osteopenia with normal Z score.  She saw pain clinic who noted that she might consider medicinal marijuana prescription if they decide not to proceed with surgery.    Past Medical History:   Diagnosis Date    Acute cholecystitis 3/23/2018    Acute kidney injury (H) 9/10/2017    Arthritis     Dehydration 9/4/2017    Diabetes type 2, controlled (H)     Elevated lactic acid level 9/10/2017    GERD (gastroesophageal reflux disease)     History of renal calculi     HTN, goal below 140/90     Hyperlipidaemia LDL goal < 100     Hypokalemia 9/3/2017    Hypothyroid     Insomnia     Left carotid stenosis     Migraine     Motion sickness      PONV (postoperative nausea and vomiting)     Sciatica of right side 6/28/2013    Type 2 diabetes mellitus without complication, without long-term current use of insulin (H) 2/16/2017     Past Surgical History:   Procedure Laterality Date    BUNIONECTOMY      Right foot    CATARACT IOL, RT/LT Bilateral 2017    COLONOSCOPY  7/12/2013    Procedure: COLONOSCOPY;  Colonoscopy;  Surgeon: Giovany Espinoza MD;  [facility]    COLONOSCOPY N/A 1/31/2019    Procedure: Combined Colonoscopy, Single Or Multiple Biopsy/Polypectomy By Biopsy;  Surgeon: Efren Osorio MD;  [facility]    COLONOSCOPY WITH CO2 INSUFFLATION N/A 1/31/2019    Procedure: COLONOSCOPY WITH CO2 INSUFFLATION;  Surgeon: Efren Osorio MD;  [facility]: [facility]    FRACTURE TX, ANKLE RT/LT      Left ankle    HC CYSTOURETHROSCOPY W/ URETEROSCOPY &/OR PYELOSCOPY; W/ LITHOTRIPSY      HC REVISE MEDIAN N/CARPAL TUNNEL SURG      Right wrist    INJECT EPIDURAL LUMBAR Right 12/27/2018    Procedure: INJECT EPIDURAL LUMBAR right foraminal narrowing severe at L4-L5 and moderate at L5-S1;  Surgeon: Gilbert Mcclure MD;  [facility]    INJECT EPIDURAL TRANSFORAMINAL Bilateral 5/27/2021    Procedure: Bilateral Lumbar 5-sacral 1 Epidural steroid injection;  Surgeon: Gilbert Mcclure MD;  [facility]    INJECT EPIDURAL TRANSFORAMINAL Right 6/17/2022    Procedure: Attempted right Lumbar 4-5 and Lumbar 5-Sacral 1 epidural injections with completion of only the right Lumbar 5-Sacral1 Transforaminal Epidural Steroid Injection with fluoroscopic guidance and contrast;  Surgeon: Gilbert Mcclure MD;  [facility]    INJECT EPIDURAL TRANSFORAMINAL Right 7/13/2023    Procedure: Lumbar 3-4 interlaminar epidural steroid injection using fluoroscopic guidance with contrast dye, Right;  Surgeon: Gilbert Mcclure MD;  [facility]    INJECT JOINT SACROILIAC  4/10/2014    Procedure: INJECT JOINT SACROILIAC;  Sacroiliac Joint Injection;  Surgeon:  "Gilbert Mcclure MD;  [facility]    INJECT JOINT SACROILIAC Left 4/11/2019    Procedure: INJECT JOINT SACROILIAC- LEFT;  Surgeon: Gilbert Mcclure MD;  [facility]    INJECT JOINT SACROILIAC Right 3/3/2023    Procedure: Fluoroscopically-guided injection into the Right Sacroiliac joint;  Surgeon: Gilbert Mcclure MD;  [facility]    LAPAROSCOPIC CHOLECYSTECTOMY N/A 3/24/2018    Procedure: LAPAROSCOPIC CHOLECYSTECTOMY;  Laparoscopic Cholecystectomy;  Surgeon: Berry Allen DO;  [facility]     Social History     Socioeconomic History    Marital status:      Spouse name: Not on file    Number of children: Not on file    Years of education: Not on file    Highest education level: Not on file   Occupational History     Employer: RETIRED     Comment: 's office part time   Tobacco Use    Smoking status: Never    Smokeless tobacco: Never   Vaping Use    Vaping Use: Never used   Substance and Sexual Activity    Alcohol use: Yes    Drug use: No    Sexual activity: Not Currently   Other Topics Concern    Parent/sibling w/ CABG, MI or angioplasty before 65F 55M? Not Asked   Social History Narrative    Not on file     Social Determinants of Health     Financial Resource Strain: Not on file   Food Insecurity: Not on file   Transportation Needs: Not on file   Physical Activity: Not on file   Stress: Not on file   Social Connections: Not on file   Interpersonal Safety: Not on file   Housing Stability: Not on file     Family History   Problem Relation Age of Onset    Hypertension Brother     Cerebrovascular Disease Brother     Diabetes Father     Cardiovascular Father     Diabetes Brother         Borderline    Breast Cancer Mother     Diabetes Brother     Blood Disease Brother     Cardiovascular Brother         MI        ROS: 10 point ROS neg other than the symptoms noted above in the HPI.    Physical Exam  /74   Pulse 74   Temp 97.2  F (36.2  C) (Temporal)   Ht 1.626 m (5' 4\")   Wt " 97.1 kg (214 lb)   LMP  (LMP Unknown)   SpO2 94%   BMI 36.73 kg/m    HEENT:  Normocephalic, atraumatic.  PERRLA.  EOM s intact.  Visual fields full to gross exam  Neck:  Supple, non-tender, without lymphadenopathy.  Heart:  No peripheral edema  Lungs:  No SOB  Abdomen:  Non-distended.   Skin:  Warm and dry.  Extremities:  No edema, cyanosis or clubbing.  Psychiatric:  No apparent distress  Musculoskeletal:  Normal bulk and tone    NEUROLOGICAL EXAMINATION:     Mental status:  Alert and Oriented x 3, speech is fluent.  Cranial nerves:  II-XII intact.   Motor:    Shoulder Abduction:  Right:  5/5   Left:  5/5  Biceps:                      Right:  5/5   Left:  5/5  Triceps:                     Right:  5/5   Left:  5/5  Wrist Extensors:       Right:  5/5   Left:  5/5  Wrist Flexors:           Right:  5/5   Left:  5/5  interosseus :            Right:  5/5   Left:  5/5  Hip Flexion:                Right: 5/5  Left:  5/5  Quadriceps:             Right:  5/5  Left:  5/5  Hamstrings:             Right:  5/5  Left:  5/5  Gastroc Soleus:        Right:  5/5  Left:  5/5  Tib/Ant:                      Right:  5/5  Left:  5/5  EHL:                     Right:  5/5  Left:  5/5  Sensation:  Intact  Reflexes:  Negative Babinski.  Negative Clonus.  Negative Clemente's.  Coordination:  Smooth finger to nose testing.   Negative pronator drift.  Requires a walker and difficulty standing.    A/P:  81F w/ lumbar scoliosis, stenosis, severe back pain    Discussed that surgery would entail T11-Pelvis decompression and fusion  We had an extensive discussion of risks and benefits discussed including infection, hematoma, CSF leak, nerve root injury, pseudoarthrosis, hardware failure, and medical complications including MI, CVA, DVT, and PE  They will have further family discussion and let us know if they decide to proceed with surgery

## 2023-10-03 ENCOUNTER — TELEPHONE (OUTPATIENT)
Dept: NEUROSURGERY | Facility: CLINIC | Age: 83
End: 2023-10-03

## 2023-10-03 DIAGNOSIS — M41.9 SCOLIOSIS OF LUMBAR SPINE, UNSPECIFIED SCOLIOSIS TYPE: Primary | ICD-10-CM

## 2023-10-03 DIAGNOSIS — M54.16 LUMBAR RADICULOPATHY: ICD-10-CM

## 2023-10-03 NOTE — TELEPHONE ENCOUNTER
Patient requesting to speak with nurse. She was told to give CPT number for her insurance because she wants to have surgery.

## 2023-10-03 NOTE — TELEPHONE ENCOUNTER
Patient saw Dr. Waite on 9/28/23 who recommended T11-pelvis decompression and fusion. Patient was going to discuss with family and call back if she would like to proceed.     Patient called stating she would like to proceed. Called patient to discuss - Patient would like to move forward with surgery. Advised patient a message will be sent to Dr. Waite to update him. Once a case request for surgery has been placed, our surgery scheduler will be in contact with patient regarding scheduling and be able to assist with insurance questions.     Provided patient with clinic phone number should she have any additional questions. Advised patient we will contact her with surgery education once case request is placed.    Patient voiced agreement and understanding.

## 2023-10-05 ENCOUNTER — TELEPHONE (OUTPATIENT)
Dept: NEUROSURGERY | Facility: CLINIC | Age: 83
End: 2023-10-05

## 2023-10-05 NOTE — TELEPHONE ENCOUNTER
Spoke with patient over the phone. Set up a telephone visit with this writer next Wednesday at 9:30am to review patient education.    Placed education packet in the mail and encouraged patient to review materials with her kids before this telephone visit so we can ensure to answer all their questions.    Also, patient will start asking her kids about transportation the day of surgery.    Mansi Quinonez RN on 10/5/2023 at 11:59 AM

## 2023-10-05 NOTE — TELEPHONE ENCOUNTER
Patient Instructions    Surgery scheduled at [facility] for T11-Pelvis decompression and fusion with Dr. Waite     Pre-Operative    Surgical risks: blood clots in the leg or lung, problems urinating, nerve damage, drainage from the incision, infection, stiffness    You will need a Pre-operative physical with primary care physician within 30 days prior to your surgical date.     You will spend 5-7 nights in the hospital.    Shower procedure  You will need to shower the night before and morning of surgery using the antimicrobial soap (8 oz chlorhexidine). You will need to purchase this from your local pharmacy. You may ask the pharmacist to help you location within the store. Please refer to showering instruction sheet in your surgery education folder.    Eating/Drinking  Stop all solid foods 8 hours before surgery.  Keep drinking clear liquids until 4 hours before surgery  Clear liquids include water, clear juice, black coffee, or clear tea without milk, Gatorade, clear soda.     Medications  Discontinue Aspirin & NSAIDs (Advil/Ibuprofen, Indocin, Naproxen,Nuprin,Relafen/Nabumetone, Diclofenac,Meloxicam, Aleve, Celebrex) 7 days prior to surgical date. After surgery, do not begin taking these medications until given clearance as it may cause bleeding and interfere with healing.  It is ok to take Tylenol (Acetaminophen) for pain within the 7 days prior to surgery. Example: you could take 1000 mg 3 times per day. Do not exceed 3,000 mg per day.   If you are on chronic pain medication (oxycodone, Percocet, hydrocodone, Vicodin, Norco, Dilaudid, morphine, MS Contin, naltrexone, Suboxone, etc) or have a pain contract we will reach out to your pain clinic to gather your most recent records and recommendations for pain management post-op.  Please ask your provider who manages your chronic pain if they require you to schedule an office visit prior to surgery. Continue obtaining your pain  medications from your current provider until surgery. Our team will manage your acute post-op pain in the hospital and during the recovery period. Your pain team will continue to manage your chronic pain.   Any other medications prescribed, please discuss with your primary care provider at your pre-operative physical        Post-Operative    Incision Care  Look at your incision site every day. You  may need a mirror or family member to help you.   Watch for signs of infection  Redness, swelling, warmth, drainage (Green or yellow drainage (pus) from your incision or increased bloody drainage), and fever of 101 degrees or higher  [facility] [phone]  Remove dressing as instructed upon discharge  Do not apply lotions or ointments to incision  It is okay to shower, just pat the incision dry   No submerging incision in water such as pools, hot tubs, or baths for at least 8 weeks and until the incision is healed    Pain Management  Dealing with pain  As your body heals, you might feel a stabbing, burning, or aching pain. You may also have some numbness.  Everyone feels pain differently, we may ask you to rate your pain using a pain scale. This will let us know how much pain you feel.   Keep in mind that medicine won't take away all of your pain. It helps to try other ways to relax and ease pain.   Things to help with pain  After surgery, we will give you medicine for your pain. These medications work well, but they can make you drowsy, itchy, or sick to your stomach. If we give you narcotics for pain, try to take the pills with food.   For mild to moderate pain, you can take medication such as Tylenol. These can be used with narcotics, but make sure that your narcotic does not contain Tylenol.   Do NOT drive while taking narcotic pain medication  Do NOT drink alcohol while using any pain medication  You can utilize ice as needed (no longer than 20 minutes at one time)  Refills of pain medication: please call the  neurosurgery clinic to request 2-3 days before you run out  Aspirin & NSAIDS (ex. Ibuprofen, aleve, naproxen): Don't take NSAIDs until 6 weeks after surgery to reduce risk of bleeding and interference with bone healing     Bowel Care  Many people have constipation (hard stools) after surgery. The narcotic pain medication we often prescribed can contribute to constipation. To help prevent constipation: Drink plenty of fluid (8-10 glasses/day); Eat more fiber, such as whole grain bread, bran cereal, and fruits and vegetables; Stay active by walking; Over the counter stool softener may also help.      Activity Restrictions  For the first 6-8 weeks, no lifting > 10 pounds, no bending, twisting, or overhead reaching.  Take stairs in moderation   Walking is the best way to start exercise after surgery. Take short frequent walks. You may gradually increase the distance as tolerated. If you feel pain, decrease your activity, but DO NOT stop walking. Walking will help you gain strength and prevent muscle soreness and spasms.   Avoid bed rest and prolonged sitting for longer than 30 minutes (change positions frequently while awake)  No contact sports or high impact activities such as; running/jogging, snowmobile or 4 carter riding or any other recreational vehicles until after given clearance at one of your follow up visits  If a brace is required per Dr. Waite, Orthotics will fit you for the brace in the hospital. Brace type and length of time to wear it will be determined by Dr. Waite.     Contact clinic right away or go to the Emergency Department if you develop:   Infection (incisional redness, swelling, warmth, drainage, or fever (temp > 101 F))  New injury  Bladder or bowel changes or loss of control    Signs of blood clot:  Swelling and/or warmth in one or both legs  Pain or tenderness in your leg, ankle, foot, or arm   Red or discolored skin     Go to the Emergency Department   If sudden onset of severe headache,  weakness, confusion, change in level of consciousness, pain, or loss of movement.  Chest pain  Trouble breathing     Post-Op Follow Up Appointments  2 week incision check & staple/suture removal with Neurosurgery Nurse  6 week post op with x-ray prior with our Physician Assistant or Nurse Practitioner  3 months post op with x-ray prior with our Physician Assistant or Nurse Practitioner  6 months post op with x-ray prior with our Physician Assistant or Nurse Practitioner  1 year post op with x-ray prior with our Physician Assistant or Nurse Practitioner  Please call to schedule follow up and xray appointments at [phone]    Resources  If you are currently employed, you will need to be off work for 4-6 weeks for recovery and healing.  Please fax any FMLA/short term disability paperwork to [phone] prior to surgery  You may call our clinic when you'd like to return to work and we can provide a work letter  To allow staff adequate time to complete paperwork, please send as soon as possible     [facility]  [facility] - [address] [address]  Telephone:  [phone]       Fax:  [phone]

## 2023-10-16 ENCOUNTER — HOSPITAL ENCOUNTER (OUTPATIENT)
Dept: CT IMAGING | Facility: CLINIC | Age: 83
Discharge: HOME | End: 2023-10-16
Attending: NEUROLOGICAL SURGERY | Admitting: NEUROLOGICAL SURGERY
Payer: COMMERCIAL

## 2023-10-16 DIAGNOSIS — M41.9 SCOLIOSIS OF LUMBAR SPINE, UNSPECIFIED SCOLIOSIS TYPE: ICD-10-CM

## 2023-10-16 DIAGNOSIS — M54.16 LUMBAR RADICULOPATHY: ICD-10-CM

## 2023-10-16 PROCEDURE — 72131 CT LUMBAR SPINE W/O DYE: CPT

## 2023-10-16 NOTE — PROGRESS NOTES
Appropriate assistive devices provided during their visit. Yes (Yes, No, N/A) tech/walker (list device)    Exam table and/or cart  placed in the lowest position. yes (Yes, No, N/A)    Brakes on tables/carts/wheelchairs used at all times. yes (Yes, No, N/A)    Non slip footwear applied. yes (Yes, No, NA)    Patient was accompanied by staff throughout visit. yes (Yes, No, N/A)    Equipment safety straps used. N/a (Yes, No, N/A)    Assist with toileting. N/a (Yes, No, N/A)

## 2023-10-19 ENCOUNTER — OFFICE VISIT (OUTPATIENT)
Dept: FAMILY MEDICINE | Facility: OTHER | Age: 83
End: 2023-10-19
Payer: COMMERCIAL

## 2023-10-19 VITALS
BODY MASS INDEX: 35.77 KG/M2 | SYSTOLIC BLOOD PRESSURE: 130 MMHG | HEART RATE: 68 BPM | TEMPERATURE: 97.6 F | HEIGHT: 64 IN | OXYGEN SATURATION: 96 % | DIASTOLIC BLOOD PRESSURE: 78 MMHG | WEIGHT: 209.5 LBS | RESPIRATION RATE: 24 BRPM

## 2023-10-19 DIAGNOSIS — E11.9 TYPE 2 DIABETES MELLITUS WITHOUT COMPLICATION, WITHOUT LONG-TERM CURRENT USE OF INSULIN (H): ICD-10-CM

## 2023-10-19 DIAGNOSIS — E66.01 MORBID OBESITY (H): ICD-10-CM

## 2023-10-19 DIAGNOSIS — M41.9 SCOLIOSIS OF LUMBAR SPINE, UNSPECIFIED SCOLIOSIS TYPE: ICD-10-CM

## 2023-10-19 DIAGNOSIS — Z01.818 PREOP GENERAL PHYSICAL EXAM: Primary | ICD-10-CM

## 2023-10-19 DIAGNOSIS — E03.9 HYPOTHYROIDISM, UNSPECIFIED TYPE: ICD-10-CM

## 2023-10-19 DIAGNOSIS — H91.92 HEARING LOSS OF LEFT EAR, UNSPECIFIED HEARING LOSS TYPE: ICD-10-CM

## 2023-10-19 DIAGNOSIS — M54.16 LUMBAR RADICULOPATHY: ICD-10-CM

## 2023-10-19 PROBLEM — N39.41 URGE INCONTINENCE OF URINE: Status: ACTIVE | Noted: 2019-04-01

## 2023-10-19 LAB
ANION GAP SERPL CALCULATED.3IONS-SCNC: 12 MMOL/L (ref 7–15)
BUN SERPL-MCNC: 18.3 MG/DL (ref 8–23)
CALCIUM SERPL-MCNC: 9.3 MG/DL (ref 8.8–10.2)
CHLORIDE SERPL-SCNC: 107 MMOL/L (ref 98–107)
CREAT SERPL-MCNC: 1.11 MG/DL (ref 0.51–0.95)
DEPRECATED HCO3 PLAS-SCNC: 24 MMOL/L (ref 22–29)
EGFRCR SERPLBLD CKD-EPI 2021: 50 ML/MIN/1.73M2
ERYTHROCYTE [DISTWIDTH] IN BLOOD BY AUTOMATED COUNT: 12.7 % (ref 10–15)
GLUCOSE SERPL-MCNC: 128 MG/DL (ref 70–99)
HBA1C MFR BLD: 6.2 % (ref 0–5.6)
HCT VFR BLD AUTO: 41.1 % (ref 35–47)
HGB BLD-MCNC: 13.1 G/DL (ref 11.7–15.7)
MCH RBC QN AUTO: 30 PG (ref 26.5–33)
MCHC RBC AUTO-ENTMCNC: 31.9 G/DL (ref 31.5–36.5)
MCV RBC AUTO: 94 FL (ref 78–100)
PLATELET # BLD AUTO: 182 10E3/UL (ref 150–450)
POTASSIUM SERPL-SCNC: 4.3 MMOL/L (ref 3.4–5.3)
RBC # BLD AUTO: 4.37 10E6/UL (ref 3.8–5.2)
SODIUM SERPL-SCNC: 143 MMOL/L (ref 135–145)
TSH SERPL DL<=0.005 MIU/L-ACNC: 2.78 UIU/ML (ref 0.3–4.2)
WBC # BLD AUTO: 5.5 10E3/UL (ref 4–11)

## 2023-10-19 PROCEDURE — 99214 OFFICE O/P EST MOD 30 MIN: CPT | Mod: 25 | Performed by: PHYSICIAN ASSISTANT

## 2023-10-19 PROCEDURE — 99207 PR FOOT EXAM NO CHARGE: CPT | Performed by: PHYSICIAN ASSISTANT

## 2023-10-19 PROCEDURE — 36415 COLL VENOUS BLD VENIPUNCTURE: CPT | Performed by: PHYSICIAN ASSISTANT

## 2023-10-19 PROCEDURE — 80048 BASIC METABOLIC PNL TOTAL CA: CPT | Performed by: PHYSICIAN ASSISTANT

## 2023-10-19 PROCEDURE — 93000 ELECTROCARDIOGRAM COMPLETE: CPT | Performed by: PHYSICIAN ASSISTANT

## 2023-10-19 PROCEDURE — 84443 ASSAY THYROID STIM HORMONE: CPT | Performed by: PHYSICIAN ASSISTANT

## 2023-10-19 PROCEDURE — 83036 HEMOGLOBIN GLYCOSYLATED A1C: CPT | Performed by: PHYSICIAN ASSISTANT

## 2023-10-19 PROCEDURE — 85027 COMPLETE CBC AUTOMATED: CPT | Performed by: PHYSICIAN ASSISTANT

## 2023-10-19 RX ORDER — RESPIRATORY SYNCYTIAL VIRUS VACCINE 120MCG/0.5
0.5 KIT INTRAMUSCULAR ONCE
Qty: 1 EACH | Refills: 0 | Status: CANCELLED | OUTPATIENT
Start: 2023-10-19 | End: 2023-10-19

## 2023-10-19 NOTE — H&P (VIEW-ONLY)
[address]  [facility] [address]  Phone: [phone]  Primary Provider: Kisha Stinson  Pre-op Performing Provider: OLGA TAN    PREOPERATIVE EVALUATION:  Today's date: 10/19/2023    Mya is a 83 year old female who presents for a preoperative evaluation.      10/19/2023    10:00 AM   Additional Questions   Roomed by Maya TORRES   Accompanied by self       Surgical Information:  Surgery/Procedure: Thoracic 11 to Pelvis robotic decompression and fusion  Surgery Location: [facility]  Surgeon:   Surgery Date: 10/25/2023  Time of Surgery: 7:00 AM   Where patient plans to recover: At a rehab center and Other: she will be at the hospital for 5-7 days post surgically and then rehab center after that  Fax number for surgical facility: Note does not need to be faxed, will be available electronically in Epic.        Assessment & Plan     The proposed surgical procedure is considered INTERMEDIATE risk.    Preop general physical exam  Scoliosis of lumbar spine, unspecified scoliosis type  Lumbar radiculopathy  Patient was provided with instruction on preparation for the upcoming procedure. A copy of these instructions were attached to the AVS for the patient to review at home.  - EKG 12-lead complete w/read - Clinics  - Basic metabolic panel  (Ca, Cl, CO2, Creat, Gluc, K, Na, BUN); Future  - CBC with platelets; Future    Type 2 diabetes mellitus without complication, without long-term current use of insulin (H)  A1c returned at 6.2% today. This is in pre-diabetes range. Patient can continue   - FOOT EXAM  - Hemoglobin A1c; Future    Hypothyroidism, unspecified type  TSH updated today. Patient will be notified of the results.   - TSH with free T4 reflex; Future     - No identified additional risk factors other than previously addressed    Antiplatelet or Anticoagulation Medication Instructions:   - Patient is on no antiplatelet or  anticoagulation medications.    Additional Medication Instructions:   - ACE/ARB: HOLD on day of surgery (minimum 11 hours for general anesthesia).   - Calcium Channel Blockers: May be continued on the day of surgery.   - Statins: Continue taking on the day of surgery.    - Herbal medications and vitamins: HOLD 14 days prior to surgery.    RECOMMENDATION:  APPROVAL GIVEN to proceed with proposed procedure, without further diagnostic evaluation.    Subjective       HPI related to upcoming procedure:   Patient is scheduled for T11 to pelvis decompression and fusion with Dr. Waite on 10/25/23.         10/19/2023     9:49 AM   Preop Questions   1. Have you ever had a heart attack or stroke? No   2. Have you ever had surgery on your heart or blood vessels, such as a stent placement, a coronary artery bypass, or surgery on an artery in your head, neck, heart, or legs? No   3. Do you have chest pain with activity? No   4. Do you have a history of  heart failure? No   5. Do you currently have a cold, bronchitis or symptoms of other infection? No   6. Do you have a cough, shortness of breath, or wheezing? No   7. Do you or anyone in your family have previous history of blood clots? YES - Daughter has  lupus anticoagulant and had blood clots. Take necessary prophylactic actions following procedure   8. Do you or does anyone in your family have a serious bleeding problem such as prolonged bleeding following surgeries or cuts? UNKNOWN    9. Have you ever had problems with anemia or been told to take iron pills? No   10. Have you had any abnormal blood loss such as black, tarry or bloody stools, or abnormal vaginal bleeding? No   11. Have you ever had a blood transfusion? No   12. Are you willing to have a blood transfusion if it is medically needed before, during, or after your surgery? Yes   13. Have you or any of your relatives ever had problems with anesthesia? UNKNOWN - Patient recalls that she has had nausea with anesthetics      14. Do you have sleep apnea, excessive snoring or daytime drowsiness? YES - Snoring, no previous diagnosis of ANAYELI   14a. Do you have a CPAP machine? No   15. Do you have any artifical heart valves or other implanted medical devices like a pacemaker, defibrillator, or continuous glucose monitor? No   16. Do you have artificial joints? No   17. Are you allergic to latex? No       Health Care Directive:  Patient has a Health Care Directive on file      Preoperative Review of :   reviewed - no record of controlled substances prescribed.    Status of Chronic Conditions:  DIABETES - Patient has a longstanding history of DiabetesType prediabetes/Type2 . Patient is being treated with diet and denies significant side effects. Control has been good. Complicating factors include but are not limited to: hypertension and hyperlipidemia.     HYPERLIPIDEMIA - Patient has a long history of significant Hyperlipidemia requiring medication for treatment with recent good control. Patient reports no problems or side effects with the medication.     HYPERTENSION - Patient has longstanding history of HTN , currently denies any symptoms referable to elevated blood pressure. Specifically denies chest pain, palpitations, dyspnea, orthopnea, PND or peripheral edema. Blood pressure readings have been in normal range. Current medication regimen is as listed below. Patient denies any side effects of medication.     HYPOTHYROIDISM - Patient has a longstanding history of chronic Hypothyroidism. Patient has been doing well, noting no tremor, insomnia, hair loss or changes in skin texture. Continues to take medications as directed, without adverse reactions or side effects. Last TSH   Lab Results   Component Value Date    TSH 2.78 10/19/2023   .      RENAL INSUFFICIENCY - Patient has a longstanding history of moderate-severe chronic renal insufficiency. Last Cr 1.11.     Review of Systems  Constitutional, neuro, ENT, endocrine, pulmonary,  cardiac, gastrointestinal, genitourinary, musculoskeletal, integument and psychiatric systems are negative, except as otherwise noted.    Patient Active Problem List    Diagnosis Date Noted    Chronic right-sided low back pain 02/02/2023     Priority: Medium    Morbid obesity (H) 08/14/2022     Priority: Medium    Impacted cerumen of right ear 01/22/2021     Priority: Medium    Balance problems 01/22/2021     Priority: Medium    Tinnitus, right 01/22/2021     Priority: Medium    Chronic venous insufficiency 11/30/2020     Priority: Medium    Diabetes mellitus with peripheral vascular disease (H) 01/20/2020     Priority: Medium    CKD (chronic kidney disease) stage 3, GFR 30-59 ml/min (H) 01/20/2020     Priority: Medium    Bilateral lower extremity edema 09/24/2019     Priority: Medium    Urinary incontinence, unspecified type 07/17/2019     Priority: Medium    S/P laparoscopic cholecystectomy 04/03/2018     Priority: Medium    Toe anomaly 04/03/2018     Priority: Medium    Cherry angioma 04/03/2018     Priority: Medium    Gastroesophageal reflux disease, esophagitis presence not specified 09/14/2017     Priority: Medium     IMO Regulatory Load OCT 2020      Sliding hiatal hernia 09/11/2017     Priority: Medium    Calculus of gallbladder without cholecystitis 09/11/2017     Priority: Medium    Rotator cuff arthropathy, right 08/28/2017     Priority: Medium    Chronic right shoulder pain 08/10/2017     Priority: Medium    Right shoulder pain, unspecified chronicity 08/10/2017     Priority: Medium    Dry mouth 08/10/2017     Priority: Medium    Primary osteoarthritis involving multiple joints 06/16/2017     Priority: Medium    Cataract, bilateral 03/30/2017     Priority: Medium    Type 2 diabetes mellitus without complication, without long-term current use of insulin (H) 02/16/2017     Priority: Medium    Hypothyroidism, unspecified type 02/14/2017     Priority: Medium    Insomnia, unspecified 02/14/2017     Priority:  Medium    OAB (overactive bladder) 09/07/2016     Priority: Medium    Essential hypertension with goal blood pressure less than 140/90 09/07/2016     Priority: Medium    Essential hypertension 02/05/2016     Priority: Medium    Diabetes mellitus with stage 3 chronic kidney disease (H) 02/05/2016     Priority: Medium     Overview:   5/22/15: Creatinine at [place], [place] 1.17, GFR 45  5/22/15: hgb A1C 6.6, microalbumin 7.92      Migraine without aura and without status migrainosus, not intractable 11/19/2015     Priority: Medium    Gastroesophageal reflux disease without esophagitis 11/19/2015     Priority: Medium    Hyperlipidemia 11/19/2015     Priority: Medium     Overview:   5/22/15: , Trig 165, HDL 47, LDL 68  5/18/16: , Trig 218, HDL 46, LDL55      Hypothyroidism 11/19/2015     Priority: Medium     Overview:   5/22/15: TSH 2.68      Osteoarthritis of hip 08/21/2014     Priority: Medium     Do you wish to do the replacement in the background? yes        DDD (degenerative disc disease), lumbar 08/21/2014     Priority: Medium    Hearing aid worn 05/30/2014     Priority: Medium     Right ear      Hip pain 09/27/2013     Priority: Medium    Sciatica of right side 06/28/2013     Priority: Medium    Asymptomatic carotid artery stenosis 05/02/2013     Priority: Medium    Hyperlipidemia with target LDL less than 100      Priority: Medium     Diagnosis updated by automated process. Provider to review and confirm.      History of renal calculi      Priority: Medium    Nevus 04/10/2013     Priority: Medium     Do you wish to do the replacement in the background? yes        Arthritis      Priority: Medium      Past Medical History:   Diagnosis Date    Acute cholecystitis 3/23/2018    Acute kidney injury (H24) 9/10/2017    Arthritis     Dehydration 9/4/2017    Diabetes type 2, controlled (H)     Elevated lactic acid level 9/10/2017    GERD (gastroesophageal reflux disease)     History of renal calculi     HTN, goal  below 140/90     Hyperlipidaemia LDL goal < 100     Hypokalemia 9/3/2017    Hypothyroid     Insomnia     Left carotid stenosis     Migraine     Motion sickness     PONV (postoperative nausea and vomiting)     Sciatica of right side 6/28/2013    Type 2 diabetes mellitus without complication, without long-term current use of insulin (H) 2/16/2017     Past Surgical History:   Procedure Laterality Date    BUNIONECTOMY      Right foot    CATARACT IOL, RT/LT Bilateral 2017    COLONOSCOPY  7/12/2013    Procedure: COLONOSCOPY;  Colonoscopy;  Surgeon: Giovany Espinoza MD;  Location: [facility]    COLONOSCOPY N/A 1/31/2019    Procedure: Combined Colonoscopy, Single Or Multiple Biopsy/Polypectomy By Biopsy;  Surgeon: Efren Osorio MD;  Location: [facility]    COLONOSCOPY WITH CO2 INSUFFLATION N/A 1/31/2019    Procedure: COLONOSCOPY WITH CO2 INSUFFLATION;  Surgeon: Efren Osorio MD;  Location: [facility]    FRACTURE TX, ANKLE RT/LT      Left ankle    HC CYSTOURETHROSCOPY W/ URETEROSCOPY &/OR PYELOSCOPY; W/ LITHOTRIPSY      HC REVISE MEDIAN N/CARPAL TUNNEL SURG      Right wrist    INJECT EPIDURAL LUMBAR Right 12/27/2018    Procedure: INJECT EPIDURAL LUMBAR right foraminal narrowing severe at L4-L5 and moderate at L5-S1;  Surgeon: Gilbert Mcclure MD;  Location: [facility]    INJECT EPIDURAL TRANSFORAMINAL Bilateral 5/27/2021    Procedure: Bilateral Lumbar 5-sacral 1 Epidural steroid injection;  Surgeon: Gilbert Mcclure MD;  Location: [facility]    INJECT EPIDURAL TRANSFORAMINAL Right 6/17/2022    Procedure: Attempted right Lumbar 4-5 and Lumbar 5-Sacral 1 epidural injections with completion of only the right Lumbar 5-Sacral1 Transforaminal Epidural Steroid Injection with fluoroscopic guidance and contrast;  Surgeon: Gilbert Mcclure MD;  Location: [facility]    INJECT EPIDURAL TRANSFORAMINAL Right 7/13/2023    Procedure: Lumbar 3-4 interlaminar epidural steroid injection using fluoroscopic guidance with contrast dye, Right;   Surgeon: Gilbert Mcclure MD;  Location: [facility]    INJECT JOINT SACROILIAC  4/10/2014    Procedure: INJECT JOINT SACROILIAC;  Sacroiliac Joint Injection;  Surgeon: Gilbert Mcclure MD;  Location: [facility]    INJECT JOINT SACROILIAC Left 4/11/2019    Procedure: INJECT JOINT SACROILIAC- LEFT;  Surgeon: Gilbert Mcclure MD;  [facility]: [facility]    INJECT JOINT SACROILIAC Right 3/3/2023    Procedure: Fluoroscopically-guided injection into the Right Sacroiliac joint;  Surgeon: Gilbert Mcclure MD;  [facility]: [facility]    LAPAROSCOPIC CHOLECYSTECTOMY N/A 3/24/2018    Procedure: LAPAROSCOPIC CHOLECYSTECTOMY;  Laparoscopic Cholecystectomy;  Surgeon: Berry Allen DO;  Location: [facility]     Current Outpatient Medications   Medication Sig Dispense Refill    acetaminophen (TYLENOL) 500 MG tablet Take 500-1,000 mg by mouth every 6 hours as needed for mild pain      blood glucose (ACCU-CHEK GUIDE) test strip Use to test blood sugar 1 times daily or as directed. 100 strip 11    blood glucose monitoring (ACCU-CHEK FASTCLIX) lancets USE TO TEST BLOOD SUGARS ONCE DAILY 100 each 11    cholestyramine (QUESTRAN) 4 g packet Take 1 packet (4 g) by mouth 2 times daily (with meals) 60 packet 5    diltiazem ER COATED BEADS (CARDIZEM CD/CARTIA XT) 120 MG 24 hr capsule Take 1 capsule (120 mg) by mouth 2 times daily 180 capsule 3    Incontinence Supply Disposable (PROTECTIVE UNDERWEAR SUPER LG) MISC 3 each daily 90 each 11    levothyroxine (SYNTHROID/LEVOTHROID) 100 MCG tablet Take 1 tablet (100 mcg) by mouth daily 90 tablet 3    lisinopril (ZESTRIL) 40 MG tablet Take 1 tablet (40 mg) by mouth daily 90 tablet 3    Multiple Vitamins-Minerals (MULTIVITAL PO) Take by mouth daily (with dinner) daily      order for DME Equipment being ordered: [employer] shoes and insoles 2 Units 1    oxybutynin ER (DITROPAN XL) 10 MG 24 hr tablet Take 1 tablet (10 mg) by mouth daily 90 tablet 3    pantoprazole (PROTONIX) 40 MG EC tablet Take 1 tablet (40 mg) by mouth 2 times  "daily 180 tablet 3    polyethylene glycol (MIRALAX) 17 g packet Take 1 packet by mouth every evening       potassium chloride ER (K-TAB/KLOR-CON) 10 MEQ CR tablet Take 1 tablet (10 mEq) by mouth 3 times daily 270 tablet 3    pravastatin (PRAVACHOL) 40 MG tablet Take 1 tablet (40 mg) by mouth daily 90 tablet 3       Allergies   Allergen Reactions    Fentanyl Nausea and Vomiting     VERY sensitive to most narcotics if not all.    Morphine And Related Nausea        Social History     Tobacco Use    Smoking status: Never    Smokeless tobacco: Never   Substance Use Topics    Alcohol use: Yes     History   Drug Use No         Objective     /78   Pulse 68   Temp 97.6  F (36.4  C) (Temporal)   Resp 24   Ht 1.626 m (5' 4\")   Wt 95 kg (209 lb 8 oz)   LMP  (LMP Unknown)   SpO2 96%   BMI 35.96 kg/m      Physical Exam    GENERAL APPEARANCE: healthy, alert and no distress     EYES: EOMI, PERRL     HENT: ear canals and TM's normal and nose and mouth without ulcers or lesions     NECK: no adenopathy, no asymmetry, masses, or scars and thyroid normal to palpation     RESP: lungs clear to auscultation - no rales, rhonchi or wheezes     CV: regular rates and rhythm, normal S1 S2, no S3 or S4 and no murmur, click or rub     MS: extremities normal- no gross deformities noted, no evidence of inflammation in joints, FROM in all extremities.     NEURO: Normal strength and tone, sensory exam grossly normal, mentation intact and speech normal     PSYCH: mentation appears normal. and affect normal/bright    Recent Labs   Lab Test 05/02/23  1111 05/13/22  1154 01/17/22  1454 01/12/22  1306   HGB 13.7  --   --  13.7   PLT  --   --   --  184    143   < > 140   POTASSIUM 4.5 4.5   < > 4.3   CR 1.14* 0.98   < > 1.35*   A1C 6.3* 6.2*  --   --     < > = values in this interval not displayed.        Diagnostics:  Recent Results (from the past 24 hour(s))   TSH with free T4 reflex    Collection Time: 10/19/23 10:53 AM   Result " Value Ref Range    TSH 2.78 0.30 - 4.20 uIU/mL   Hemoglobin A1c    Collection Time: 10/19/23 10:53 AM   Result Value Ref Range    Hemoglobin A1C 6.2 (H) 0.0 - 5.6 %   Basic metabolic panel  (Ca, Cl, CO2, Creat, Gluc, K, Na, BUN)    Collection Time: 10/19/23 10:53 AM   Result Value Ref Range    Sodium 143 135 - 145 mmol/L    Potassium 4.3 3.4 - 5.3 mmol/L    Chloride 107 98 - 107 mmol/L    Carbon Dioxide (CO2) 24 22 - 29 mmol/L    Anion Gap 12 7 - 15 mmol/L    Urea Nitrogen 18.3 8.0 - 23.0 mg/dL    Creatinine 1.11 (H) 0.51 - 0.95 mg/dL    GFR Estimate 50 (L) >60 mL/min/1.73m2    Calcium 9.3 8.8 - 10.2 mg/dL    Glucose 128 (H) 70 - 99 mg/dL   CBC with platelets    Collection Time: 10/19/23 10:53 AM   Result Value Ref Range    WBC Count 5.5 4.0 - 11.0 10e3/uL    RBC Count 4.37 3.80 - 5.20 10e6/uL    Hemoglobin 13.1 11.7 - 15.7 g/dL    Hematocrit 41.1 35.0 - 47.0 %    MCV 94 78 - 100 fL    MCH 30.0 26.5 - 33.0 pg    MCHC 31.9 31.5 - 36.5 g/dL    RDW 12.7 10.0 - 15.0 %    Platelet Count 182 150 - 450 10e3/uL      EKG unchanged from most recent    Revised Cardiac Risk Index (RCRI):  The patient has the following serious cardiovascular risks for perioperative complications:   - No serious cardiac risks = 0 points     RCRI Interpretation: 0 points: Class I (very low risk - 0.4% complication rate)    Signed Electronically by: Fareed Roldan PA-C  Copy of this evaluation report is provided to requesting physician.

## 2023-10-19 NOTE — RESULT ENCOUNTER NOTE
Please let patient know that her labs returned without concerning finding. She is approved to proceed with her upcoming procedure.     Fareed Roldan PA-C on 10/19/2023 at 2:25 PM

## 2023-10-19 NOTE — PROGRESS NOTES
[address]  [facility] [address]  Phone: [phone]  Primary Provider: Kisha Stinson  Pre-op Performing Provider: OLGA TAN    PREOPERATIVE EVALUATION:  Today's date: 10/19/2023    Mya is a 83 year old female who presents for a preoperative evaluation.      10/19/2023    10:00 AM   Additional Questions   Roomed by Maya TORRES   Accompanied by self       Surgical Information:  Surgery/Procedure: Thoracic 11 to Pelvis robotic decompression and fusion  Surgery Location: [facility]  Surgeon:   Surgery Date: 10/25/2023  Time of Surgery: 7:00 AM   Where patient plans to recover: At a rehab center and Other: she will be at the hospital for 5-7 days post surgically and then rehab center after that  Fax number for surgical facility: Note does not need to be faxed, will be available electronically in Epic.        Assessment & Plan     The proposed surgical procedure is considered INTERMEDIATE risk.    Preop general physical exam  Scoliosis of lumbar spine, unspecified scoliosis type  Lumbar radiculopathy  Patient was provided with instruction on preparation for the upcoming procedure. A copy of these instructions were attached to the AVS for the patient to review at home.  - EKG 12-lead complete w/read - Clinics  - Basic metabolic panel  (Ca, Cl, CO2, Creat, Gluc, K, Na, BUN); Future  - CBC with platelets; Future    Type 2 diabetes mellitus without complication, without long-term current use of insulin (H)  A1c returned at 6.2% today. This is in pre-diabetes range. Patient can continue   - FOOT EXAM  - Hemoglobin A1c; Future    Hypothyroidism, unspecified type  TSH updated today. Patient will be notified of the results.   - TSH with free T4 reflex; Future     - No identified additional risk factors other than previously addressed    Antiplatelet or Anticoagulation Medication Instructions:   - Patient is on no antiplatelet or  anticoagulation medications.    Additional Medication Instructions:   - ACE/ARB: HOLD on day of surgery (minimum 11 hours for general anesthesia).   - Calcium Channel Blockers: May be continued on the day of surgery.   - Statins: Continue taking on the day of surgery.    - Herbal medications and vitamins: HOLD 14 days prior to surgery.    RECOMMENDATION:  APPROVAL GIVEN to proceed with proposed procedure, without further diagnostic evaluation.    Subjective       HPI related to upcoming procedure:   Patient is scheduled for T11 to pelvis decompression and fusion with Dr. Waite on 10/25/23.         10/19/2023     9:49 AM   Preop Questions   1. Have you ever had a heart attack or stroke? No   2. Have you ever had surgery on your heart or blood vessels, such as a stent placement, a coronary artery bypass, or surgery on an artery in your head, neck, heart, or legs? No   3. Do you have chest pain with activity? No   4. Do you have a history of  heart failure? No   5. Do you currently have a cold, bronchitis or symptoms of other infection? No   6. Do you have a cough, shortness of breath, or wheezing? No   7. Do you or anyone in your family have previous history of blood clots? YES - Daughter has  lupus anticoagulant and had blood clots. Take necessary prophylactic actions following procedure   8. Do you or does anyone in your family have a serious bleeding problem such as prolonged bleeding following surgeries or cuts? UNKNOWN    9. Have you ever had problems with anemia or been told to take iron pills? No   10. Have you had any abnormal blood loss such as black, tarry or bloody stools, or abnormal vaginal bleeding? No   11. Have you ever had a blood transfusion? No   12. Are you willing to have a blood transfusion if it is medically needed before, during, or after your surgery? Yes   13. Have you or any of your relatives ever had problems with anesthesia? UNKNOWN - Patient recalls that she has had nausea with anesthetics      14. Do you have sleep apnea, excessive snoring or daytime drowsiness? YES - Snoring, no previous diagnosis of ANAYELI   14a. Do you have a CPAP machine? No   15. Do you have any artifical heart valves or other implanted medical devices like a pacemaker, defibrillator, or continuous glucose monitor? No   16. Do you have artificial joints? No   17. Are you allergic to latex? No       Health Care Directive:  Patient has a Health Care Directive on file      Preoperative Review of :   reviewed - no record of controlled substances prescribed.    Status of Chronic Conditions:  DIABETES - Patient has a longstanding history of DiabetesType prediabetes/Type2 . Patient is being treated with diet and denies significant side effects. Control has been good. Complicating factors include but are not limited to: hypertension and hyperlipidemia.     HYPERLIPIDEMIA - Patient has a long history of significant Hyperlipidemia requiring medication for treatment with recent good control. Patient reports no problems or side effects with the medication.     HYPERTENSION - Patient has longstanding history of HTN , currently denies any symptoms referable to elevated blood pressure. Specifically denies chest pain, palpitations, dyspnea, orthopnea, PND or peripheral edema. Blood pressure readings have been in normal range. Current medication regimen is as listed below. Patient denies any side effects of medication.     HYPOTHYROIDISM - Patient has a longstanding history of chronic Hypothyroidism. Patient has been doing well, noting no tremor, insomnia, hair loss or changes in skin texture. Continues to take medications as directed, without adverse reactions or side effects. Last TSH   Lab Results   Component Value Date    TSH 2.78 10/19/2023   .      RENAL INSUFFICIENCY - Patient has a longstanding history of moderate-severe chronic renal insufficiency. Last Cr 1.11.     Review of Systems  Constitutional, neuro, ENT, endocrine, pulmonary,  cardiac, gastrointestinal, genitourinary, musculoskeletal, integument and psychiatric systems are negative, except as otherwise noted.    Patient Active Problem List    Diagnosis Date Noted    Chronic right-sided low back pain 02/02/2023     Priority: Medium    Morbid obesity (H) 08/14/2022     Priority: Medium    Impacted cerumen of right ear 01/22/2021     Priority: Medium    Balance problems 01/22/2021     Priority: Medium    Tinnitus, right 01/22/2021     Priority: Medium    Chronic venous insufficiency 11/30/2020     Priority: Medium    Diabetes mellitus with peripheral vascular disease (H) 01/20/2020     Priority: Medium    CKD (chronic kidney disease) stage 3, GFR 30-59 ml/min (H) 01/20/2020     Priority: Medium    Bilateral lower extremity edema 09/24/2019     Priority: Medium    Urinary incontinence, unspecified type 07/17/2019     Priority: Medium    S/P laparoscopic cholecystectomy 04/03/2018     Priority: Medium    Toe anomaly 04/03/2018     Priority: Medium    Cherry angioma 04/03/2018     Priority: Medium    Gastroesophageal reflux disease, esophagitis presence not specified 09/14/2017     Priority: Medium     IMO Regulatory Load OCT 2020      Sliding hiatal hernia 09/11/2017     Priority: Medium    Calculus of gallbladder without cholecystitis 09/11/2017     Priority: Medium    Rotator cuff arthropathy, right 08/28/2017     Priority: Medium    Chronic right shoulder pain 08/10/2017     Priority: Medium    Right shoulder pain, unspecified chronicity 08/10/2017     Priority: Medium    Dry mouth 08/10/2017     Priority: Medium    Primary osteoarthritis involving multiple joints 06/16/2017     Priority: Medium    Cataract, bilateral 03/30/2017     Priority: Medium    Type 2 diabetes mellitus without complication, without long-term current use of insulin (H) 02/16/2017     Priority: Medium    Hypothyroidism, unspecified type 02/14/2017     Priority: Medium    Insomnia, unspecified 02/14/2017     Priority:  Medium    OAB (overactive bladder) 09/07/2016     Priority: Medium    Essential hypertension with goal blood pressure less than 140/90 09/07/2016     Priority: Medium    Essential hypertension 02/05/2016     Priority: Medium    Diabetes mellitus with stage 3 chronic kidney disease (H) 02/05/2016     Priority: Medium     Overview:   5/22/15: Creatinine at [place], [place] 1.17, GFR 45  5/22/15: hgb A1C 6.6, microalbumin 7.92      Migraine without aura and without status migrainosus, not intractable 11/19/2015     Priority: Medium    Gastroesophageal reflux disease without esophagitis 11/19/2015     Priority: Medium    Hyperlipidemia 11/19/2015     Priority: Medium     Overview:   5/22/15: , Trig 165, HDL 47, LDL 68  5/18/16: , Trig 218, HDL 46, LDL55      Hypothyroidism 11/19/2015     Priority: Medium     Overview:   5/22/15: TSH 2.68      Osteoarthritis of hip 08/21/2014     Priority: Medium     Do you wish to do the replacement in the background? yes        DDD (degenerative disc disease), lumbar 08/21/2014     Priority: Medium    Hearing aid worn 05/30/2014     Priority: Medium     Right ear      Hip pain 09/27/2013     Priority: Medium    Sciatica of right side 06/28/2013     Priority: Medium    Asymptomatic carotid artery stenosis 05/02/2013     Priority: Medium    Hyperlipidemia with target LDL less than 100      Priority: Medium     Diagnosis updated by automated process. Provider to review and confirm.      History of renal calculi      Priority: Medium    Nevus 04/10/2013     Priority: Medium     Do you wish to do the replacement in the background? yes        Arthritis      Priority: Medium      Past Medical History:   Diagnosis Date    Acute cholecystitis 3/23/2018    Acute kidney injury (H24) 9/10/2017    Arthritis     Dehydration 9/4/2017    Diabetes type 2, controlled (H)     Elevated lactic acid level 9/10/2017    GERD (gastroesophageal reflux disease)     History of renal calculi     HTN, goal  below 140/90     Hyperlipidaemia LDL goal < 100     Hypokalemia 9/3/2017    Hypothyroid     Insomnia     Left carotid stenosis     Migraine     Motion sickness     PONV (postoperative nausea and vomiting)     Sciatica of right side 6/28/2013    Type 2 diabetes mellitus without complication, without long-term current use of insulin (H) 2/16/2017     Past Surgical History:   Procedure Laterality Date    BUNIONECTOMY      Right foot    CATARACT IOL, RT/LT Bilateral 2017    COLONOSCOPY  7/12/2013    Procedure: COLONOSCOPY;  Colonoscopy;  Surgeon: Giovany Espinoza MD;  Location: [facility]    COLONOSCOPY N/A 1/31/2019    Procedure: Combined Colonoscopy, Single Or Multiple Biopsy/Polypectomy By Biopsy;  Surgeon: Efren Osorio MD;  Location: [facility]    COLONOSCOPY WITH CO2 INSUFFLATION N/A 1/31/2019    Procedure: COLONOSCOPY WITH CO2 INSUFFLATION;  Surgeon: Efren Osorio MD;  Location: [facility]    FRACTURE TX, ANKLE RT/LT      Left ankle    HC CYSTOURETHROSCOPY W/ URETEROSCOPY &/OR PYELOSCOPY; W/ LITHOTRIPSY      HC REVISE MEDIAN N/CARPAL TUNNEL SURG      Right wrist    INJECT EPIDURAL LUMBAR Right 12/27/2018    Procedure: INJECT EPIDURAL LUMBAR right foraminal narrowing severe at L4-L5 and moderate at L5-S1;  Surgeon: Gilbert Mcclure MD;  Location: [facility]    INJECT EPIDURAL TRANSFORAMINAL Bilateral 5/27/2021    Procedure: Bilateral Lumbar 5-sacral 1 Epidural steroid injection;  Surgeon: Gilbert Mcclure MD;  Location: [facility]    INJECT EPIDURAL TRANSFORAMINAL Right 6/17/2022    Procedure: Attempted right Lumbar 4-5 and Lumbar 5-Sacral 1 epidural injections with completion of only the right Lumbar 5-Sacral1 Transforaminal Epidural Steroid Injection with fluoroscopic guidance and contrast;  Surgeon: Gilbert Mcclure MD;  Location: [facility]    INJECT EPIDURAL TRANSFORAMINAL Right 7/13/2023    Procedure: Lumbar 3-4 interlaminar epidural steroid injection using fluoroscopic guidance with contrast dye, Right;   Surgeon: Gilbert Mcclure MD;  Location[facility]    INJECT JOINT SACROILIAC  4/10/2014    Procedure: INJECT JOINT SACROILIAC;  Sacroiliac Joint Injection;  Surgeon: Gilbert Mcclure MD;  [facility]    INJECT JOINT SACROILIAC Left 4/11/2019    Procedure: INJECT JOINT SACROILIAC- LEFT;  Surgeon: Gilbert Mcclure MD;  [facility]: [facility]    INJECT JOINT SACROILIAC Right 3/3/2023    Procedure: Fluoroscopically-guided injection into the Right Sacroiliac joint;  Surgeon: Gilbert Mcclure MD;  [facility]    LAPAROSCOPIC CHOLECYSTECTOMY N/A 3/24/2018    Procedure: LAPAROSCOPIC CHOLECYSTECTOMY;  Laparoscopic Cholecystectomy;  Surgeon: Berry Allen DO;  Location: [facility]     Current Outpatient Medications   Medication Sig Dispense Refill    acetaminophen (TYLENOL) 500 MG tablet Take 500-1,000 mg by mouth every 6 hours as needed for mild pain      blood glucose (ACCU-CHEK GUIDE) test strip Use to test blood sugar 1 times daily or as directed. 100 strip 11    blood glucose monitoring (ACCU-CHEK FASTCLIX) lancets USE TO TEST BLOOD SUGARS ONCE DAILY 100 each 11    cholestyramine (QUESTRAN) 4 g packet Take 1 packet (4 g) by mouth 2 times daily (with meals) 60 packet 5    diltiazem ER COATED BEADS (CARDIZEM CD/CARTIA XT) 120 MG 24 hr capsule Take 1 capsule (120 mg) by mouth 2 times daily 180 capsule 3    Incontinence Supply Disposable (PROTECTIVE UNDERWEAR SUPER LG) MISC 3 each daily 90 each 11    levothyroxine (SYNTHROID/LEVOTHROID) 100 MCG tablet Take 1 tablet (100 mcg) by mouth daily 90 tablet 3    lisinopril (ZESTRIL) 40 MG tablet Take 1 tablet (40 mg) by mouth daily 90 tablet 3    Multiple Vitamins-Minerals (MULTIVITAL PO) Take by mouth daily (with dinner) daily      order for DME Equipment being ordered: [employer] shoes and insoles 2 Units 1    oxybutynin ER (DITROPAN XL) 10 MG 24 hr tablet Take 1 tablet (10 mg) by mouth daily 90 tablet 3    pantoprazole (PROTONIX) 40 MG EC tablet Take 1 tablet (40 mg) by mouth 2 times  "daily 180 tablet 3    polyethylene glycol (MIRALAX) 17 g packet Take 1 packet by mouth every evening       potassium chloride ER (K-TAB/KLOR-CON) 10 MEQ CR tablet Take 1 tablet (10 mEq) by mouth 3 times daily 270 tablet 3    pravastatin (PRAVACHOL) 40 MG tablet Take 1 tablet (40 mg) by mouth daily 90 tablet 3       Allergies   Allergen Reactions    Fentanyl Nausea and Vomiting     VERY sensitive to most narcotics if not all.    Morphine And Related Nausea        Social History     Tobacco Use    Smoking status: Never    Smokeless tobacco: Never   Substance Use Topics    Alcohol use: Yes     History   Drug Use No         Objective     /78   Pulse 68   Temp 97.6  F (36.4  C) (Temporal)   Resp 24   Ht 1.626 m (5' 4\")   Wt 95 kg (209 lb 8 oz)   LMP  (LMP Unknown)   SpO2 96%   BMI 35.96 kg/m      Physical Exam    GENERAL APPEARANCE: healthy, alert and no distress     EYES: EOMI, PERRL     HENT: ear canals and TM's normal and nose and mouth without ulcers or lesions     NECK: no adenopathy, no asymmetry, masses, or scars and thyroid normal to palpation     RESP: lungs clear to auscultation - no rales, rhonchi or wheezes     CV: regular rates and rhythm, normal S1 S2, no S3 or S4 and no murmur, click or rub     MS: extremities normal- no gross deformities noted, no evidence of inflammation in joints, FROM in all extremities.     NEURO: Normal strength and tone, sensory exam grossly normal, mentation intact and speech normal     PSYCH: mentation appears normal. and affect normal/bright    Recent Labs   Lab Test 05/02/23  1111 05/13/22  1154 01/17/22  1454 01/12/22  1306   HGB 13.7  --   --  13.7   PLT  --   --   --  184    143   < > 140   POTASSIUM 4.5 4.5   < > 4.3   CR 1.14* 0.98   < > 1.35*   A1C 6.3* 6.2*  --   --     < > = values in this interval not displayed.        Diagnostics:  Recent Results (from the past 24 hour(s))   TSH with free T4 reflex    Collection Time: 10/19/23 10:53 AM   Result " Value Ref Range    TSH 2.78 0.30 - 4.20 uIU/mL   Hemoglobin A1c    Collection Time: 10/19/23 10:53 AM   Result Value Ref Range    Hemoglobin A1C 6.2 (H) 0.0 - 5.6 %   Basic metabolic panel  (Ca, Cl, CO2, Creat, Gluc, K, Na, BUN)    Collection Time: 10/19/23 10:53 AM   Result Value Ref Range    Sodium 143 135 - 145 mmol/L    Potassium 4.3 3.4 - 5.3 mmol/L    Chloride 107 98 - 107 mmol/L    Carbon Dioxide (CO2) 24 22 - 29 mmol/L    Anion Gap 12 7 - 15 mmol/L    Urea Nitrogen 18.3 8.0 - 23.0 mg/dL    Creatinine 1.11 (H) 0.51 - 0.95 mg/dL    GFR Estimate 50 (L) >60 mL/min/1.73m2    Calcium 9.3 8.8 - 10.2 mg/dL    Glucose 128 (H) 70 - 99 mg/dL   CBC with platelets    Collection Time: 10/19/23 10:53 AM   Result Value Ref Range    WBC Count 5.5 4.0 - 11.0 10e3/uL    RBC Count 4.37 3.80 - 5.20 10e6/uL    Hemoglobin 13.1 11.7 - 15.7 g/dL    Hematocrit 41.1 35.0 - 47.0 %    MCV 94 78 - 100 fL    MCH 30.0 26.5 - 33.0 pg    MCHC 31.9 31.5 - 36.5 g/dL    RDW 12.7 10.0 - 15.0 %    Platelet Count 182 150 - 450 10e3/uL      EKG unchanged from most recent    Revised Cardiac Risk Index (RCRI):  The patient has the following serious cardiovascular risks for perioperative complications:   - No serious cardiac risks = 0 points     RCRI Interpretation: 0 points: Class I (very low risk - 0.4% complication rate)    Signed Electronically by: Fareed Roldan PA-C  Copy of this evaluation report is provided to requesting physician.

## 2023-10-19 NOTE — PATIENT INSTRUCTIONS
Preparing for Your Surgery  Getting started  A nurse will call you to review your health history and instructions. They will give you an arrival time based on your scheduled surgery time. Please be ready to share:  Your doctor's clinic name and phone number  Your medical, surgical, and anesthesia history  A list of allergies and sensitivities  A list of medicines, including herbal treatments and over-the-counter drugs  Whether the patient has a legal guardian (ask how to send us the papers in advance)  Please tell us if you're pregnant--or if there's any chance you might be pregnant. Some surgeries may injure a fetus (unborn baby), so they require a pregnancy test. Surgeries that are safe for a fetus don't always need a test, and you can choose whether to have one.   If you have a child who's having surgery, please ask for a copy of Preparing for Your Child's Surgery.    Preparing for surgery  Within 10 to 30 days of surgery: Have a pre-op exam (sometimes called an H&P, or History and Physical). This can be done at a clinic or pre-operative center.  If you're having a , you may not need this exam. Talk to your care team.  At your pre-op exam, talk to your care team about all medicines you take. If you need to stop any medicines before surgery, ask when to start taking them again.  We do this for your safety. Many medicines can make you bleed too much during surgery. Some change how well surgery (anesthesia) drugs work.  Call your insurance company to let them know you're having surgery. (If you don't have insurance, call [phone].)  Call your clinic if there's any change in your health. This includes signs of a cold or flu (sore throat, runny nose, cough, rash, fever). It also includes a scrape or scratch near the surgery site.  If you have questions on the day of surgery, call your hospital or surgery center.  Eating and drinking guidelines  For your safety: Unless your surgeon tells you otherwise,  follow the guidelines below.  Eat and drink as usual until 8 hours before you arrive for surgery. After that, no food or milk.  If you drink alcohol: Stop drinking it the night before surgery.  Avoid NSAIDs (ibuprofen, advil, motrin, aleve, naproxen, etc) from here on out  Tylenol is aok to use up until the day of   If your care team tells you to take medicine on the morning of surgery, it's okay to take it with a sip of water.   - Lisinopril: HOLD on day of surgery (minimum 11 hours for general anesthesia).  Cholestyramine: Hold AM dose due to fasting. May take PM dose.   - Diltiazem: May be continued on the day of surgery.   - Pravastatin: Continue taking on the day of surgery. Or wait to take until after procedure is over.   Levothyroxine: Continue on day of procedure or wait to take after procedure is over    - Herbal medications and vitamins: HOLD 14 days prior to surgery.  Preventing infection  Shower or bathe the night before and morning of your surgery. Follow the instructions your clinic gave you. (If no instructions, use regular soap.)  Don't shave or clip hair near your surgery site. We'll remove the hair if needed.  Don't smoke or vape the morning of surgery. You may chew nicotine gum up to 2 hours before surgery. A nicotine patch is okay.  Note: Some surgeries require you to completely quit smoking and nicotine. Check with your surgeon.  Your care team will make every effort to keep you safe from infection. We will:  Clean our hands often with soap and water (or an alcohol-based hand rub).  Clean the skin at your surgery site with a special soap that kills germs.  Give you a special gown to keep you warm. (Cold raises the risk of infection.)  Wear special hair covers, masks, gowns and gloves during surgery.  Give antibiotic medicine, if prescribed. Not all surgeries need antibiotics.  What to bring on the day of surgery  Photo ID and insurance card  Copy of your health care directive, if you have  one  Glasses and hearing aids (bring cases)  You can't wear contacts during surgery  Inhaler and eye drops, if you use them (tell us about these when you arrive)  CPAP machine or breathing device, if you use them  A few personal items, if spending the night  If you have . . .  A pacemaker, ICD (cardiac defibrillator) or other implant: Bring the ID card.  An implanted stimulator: Bring the remote control.  A legal guardian: Bring a copy of the certified (court-stamped) guardianship papers.  Please remove any jewelry, including body piercings. Leave jewelry and other valuables at home.  If you're going home the day of surgery  You must have a responsible adult drive you home. They should stay with you overnight as well.  If you don't have someone to stay with you, and you aren't safe to go home alone, we may keep you overnight. Insurance often won't pay for this.  After surgery  If it's hard to control your pain or you need more pain medicine, please call your surgeon's office.  Questions?   If you have any questions for your care team, list them here: _________________________________________________________________________________________________________________________________________________________________________ ____________________________________ ____________________________________ ____________________________________  For informational purposes only. Not to replace the advice of your health care provider. Copyright   2003, 2019 [facility]. All rights reserved. Clinically reviewed by Liliane Galicia MD. [employer] [id number] - REV 12/22.

## 2023-10-23 ENCOUNTER — TELEPHONE (OUTPATIENT)
Dept: FAMILY MEDICINE | Facility: OTHER | Age: 83
End: 2023-10-23
Payer: COMMERCIAL

## 2023-10-23 NOTE — TELEPHONE ENCOUNTER
Spoke with patient over the phone. Patient verified understanding and has no further questions at this time.  Connie Walters, CMA

## 2023-10-24 ENCOUNTER — ANESTHESIA EVENT (OUTPATIENT)
Dept: SURGERY | Facility: CLINIC | Age: 83
DRG: 453 | End: 2023-10-24
Payer: COMMERCIAL

## 2023-10-24 NOTE — ANESTHESIA PREPROCEDURE EVALUATION
Anesthesia Pre-Procedure Evaluation    Patient: Mya Hui   MRN: [id number] : 1940        Procedure : Procedure(s):  Thoracic 11 to pelvis robotic decompression and fusion          Past Medical History:   Diagnosis Date    Acute cholecystitis 3/23/2018    Acute kidney injury (H24) 9/10/2017    Arthritis     Dehydration 2017    Diabetes type 2, controlled (H)     Elevated lactic acid level 9/10/2017    GERD (gastroesophageal reflux disease)     History of renal calculi     HTN, goal below 140/90     Hyperlipidaemia LDL goal < 100     Hypokalemia 9/3/2017    Hypothyroid     Insomnia     Left carotid stenosis     Migraine     Motion sickness     PONV (postoperative nausea and vomiting)     Sciatica of right side 2013    Type 2 diabetes mellitus without complication, without long-term current use of insulin (H) 2017      Past Surgical History:   Procedure Laterality Date    BUNIONECTOMY      Right foot    CATARACT IOL, RT/LT Bilateral     COLONOSCOPY  2013    Procedure: COLONOSCOPY;  Colonoscopy;  Surgeon: Giovany Espinoza MD;  Location: [facility]    COLONOSCOPY N/A 2019    Procedure: Combined Colonoscopy, Single Or Multiple Biopsy/Polypectomy By Biopsy;  Surgeon: Efren Osorio MD;  Location: [facility]    COLONOSCOPY WITH CO2 INSUFFLATION N/A 2019    Procedure: COLONOSCOPY WITH CO2 INSUFFLATION;  Surgeon: Efren Osorio MD;  Location: [facility]    FRACTURE TX, ANKLE RT/LT      Left ankle    HC CYSTOURETHROSCOPY W/ URETEROSCOPY &/OR PYELOSCOPY; W/ LITHOTRIPSY      HC REVISE MEDIAN N/CARPAL TUNNEL SURG      Right wrist    INJECT EPIDURAL LUMBAR Right 2018    Procedure: INJECT EPIDURAL LUMBAR right foraminal narrowing severe at L4-L5 and moderate at L5-S1;  Surgeon: Gilbert Mcclure MD;  [facility]    INJECT EPIDURAL TRANSFORAMINAL Bilateral 2021    Procedure: Bilateral Lumbar 5-sacral 1 Epidural steroid injection;  Surgeon: Gilbert Mcclure  MD;  [facility]    INJECT EPIDURAL TRANSFORAMINAL Right 6/17/2022    Procedure: Attempted right Lumbar 4-5 and Lumbar 5-Sacral 1 epidural injections with completion of only the right Lumbar 5-Sacral1 Transforaminal Epidural Steroid Injection with fluoroscopic guidance and contrast;  Surgeon: Gilbert Mcclure MD;  [facility]    INJECT EPIDURAL TRANSFORAMINAL Right 7/13/2023    Procedure: Lumbar 3-4 interlaminar epidural steroid injection using fluoroscopic guidance with contrast dye, Right;  Surgeon: Gilbert Mcclure MD;  [facility]    INJECT JOINT SACROILIAC  4/10/2014    Procedure: INJECT JOINT SACROILIAC;  Sacroiliac Joint Injection;  Surgeon: Gilbert Mcclure MD;  [facility]    INJECT JOINT SACROILIAC Left 4/11/2019    Procedure: INJECT JOINT SACROILIAC- LEFT;  Surgeon: Gilbert Mcclure MD;  [facility]    INJECT JOINT SACROILIAC Right 3/3/2023    Procedure: Fluoroscopically-guided injection into the Right Sacroiliac joint;  Surgeon: Gilbert Mcclure MD;  [facility]    LAPAROSCOPIC CHOLECYSTECTOMY N/A 3/24/2018    Procedure: LAPAROSCOPIC CHOLECYSTECTOMY;  Laparoscopic Cholecystectomy;  Surgeon: Berry Allen DO;  Location[facility]      Allergies   Allergen Reactions    Fentanyl Nausea and Vomiting     VERY sensitive to most narcotics if not all.    Morphine And Related Nausea      Social History     Tobacco Use    Smoking status: Never    Smokeless tobacco: Never   Substance Use Topics    Alcohol use: Yes      Wt Readings from Last 1 Encounters:   10/19/23 95 kg (209 lb 8 oz)        Anesthesia Evaluation   Pt has had prior anesthetic. Type: General and MAC.    History of anesthetic complications  - PONV.      ROS/MED HX  ENT/Pulmonary:    (-) tobacco use, asthma and sleep apnea   Neurologic: Comment: Scoliosis of lumbar spine, unspecified scoliosis type [M41.9]    R radiculopathy and weakness      (+)    no peripheral neuropathy                         (-) no seizures and no CVA    Cardiovascular:     (+)  hypertension-range: 130/78/ -   -  - -                                   (-) CAD and arrhythmias   METS/Exercise Tolerance:     Hematologic:       Musculoskeletal:       GI/Hepatic:     (+) GERD,     hiatal hernia,              Renal/Genitourinary:     (+) renal disease, type: CRI, Pt does not require dialysis,           Endo:     (+)  type II DM (no medications),   Not using insulin,     thyroid problem, hypothyroidism,    Obesity,       Psychiatric/Substance Use:       Infectious Disease:    (-) Recent Fever   Malignancy:       Other:            Physical Exam    Airway  airway exam normal      Mallampati: II   TM distance: > 3 FB   Neck ROM: full   Mouth opening: > 3 cm    Respiratory Devices and Support         Dental       (+) Modest Abnormalities - crowns, retainers, 1 or 2 missing teeth and Removable bridges or other hardware      Cardiovascular   cardiovascular exam normal          Pulmonary   pulmonary exam normal                  Reading Physician Reading Date Result Priority   Teresita Trent MD  [phone]  [phone] 10/7/2022      Narrative & Impression  [id number]  [id number]  [id number]  [id number]^DAVID^ANJELICA^KARLA     [facility]  Echocardiography Laboratory  [address] [place] [address]     Name: GLADYS MCINTYRE  MRN: [id number]  : 1940  Study Date: 10/07/2022 01:57 PM  Age: 82 yrs  Gender: Female  Patient Location: [facility]  Reason For Study: Irregular heart rate, Bigeminy  History: HTN,Hyperlipidemia,Obesity,CKD, Diabetes  Ordering Physician: ANJELICA HERNANDEZ  Referring Physician: Kisha Stinson  Performed By: Susan Shin     BSA: 2.0 m2  Height: 64 in  Weight: 200 lb  HR: 60  BP: 138/80 mmHg  ______________________________________________________________________________  Procedure  Complete Echo Adult. Optison (NDC #[id number]) given  "intravenously.  ______________________________________________________________________________  Interpretation Summary     1. Left ventricular systolic function is normal. The visual ejection fraction  is 60-65%.  2. No regional wall motion abnormalities noted.  3. The right ventricle is normal in structure, function and size.  4. No evidence for significant valvular pathology.    NM Lexiscan stress test  Order: [id number]  Status: Final result       Visible to patient: No (not released)       Next appt: 10/26/2023 at 10:15 AM in Physical Therapy (Marion Grover PT)       Dx: Abnormal electrocardiogram; Edema, un...    1 Result Note  Details    Reading Physician Reading Date Result Priority   Raul Mariscal MD  [phone] 11/12/2020 Routine   Raul Mariscal MD  [phone] 11/12/2020      Result Text       The nuclear stress test is negative for inducible myocardial ischemia or infarction.    Left ventricular function is normal.    The left ventricular ejection fraction at stress is 67%.    There is no prior study for comparison.             OUTSIDE LABS:  CBC:   Lab Results   Component Value Date    WBC 5.5 10/19/2023    WBC 7.5 01/12/2022    HGB 13.1 10/19/2023    HGB 13.7 05/02/2023    HCT 41.1 10/19/2023    HCT 41.3 01/12/2022     10/19/2023     01/12/2022     BMP:   Lab Results   Component Value Date     10/19/2023     05/02/2023    POTASSIUM 4.3 10/19/2023    POTASSIUM 4.5 05/02/2023    CHLORIDE 107 10/19/2023    CHLORIDE 108 (H) 05/02/2023    CO2 24 10/19/2023    CO2 26 05/02/2023    BUN 18.3 10/19/2023    BUN 13.2 05/02/2023    CR 1.11 (H) 10/19/2023    CR 1.14 (H) 05/02/2023     (H) 10/19/2023     (H) 05/02/2023     COAGS: No results found for: \"PTT\", \"INR\", \"FIBR\"  POC:   Lab Results   Component Value Date     (H) 05/27/2021     HEPATIC:   Lab Results   Component Value Date    ALBUMIN 4.3 05/02/2023    PROTTOTAL 6.9 05/02/2023    ALT 28 " 05/02/2023    AST 30 05/02/2023    ALKPHOS 71 05/02/2023    BILITOTAL 0.3 05/02/2023     OTHER:   Lab Results   Component Value Date    LACT 1.0 09/10/2017    A1C 6.2 (H) 10/19/2023    TERESA 9.3 10/19/2023    PHOS 3.3 09/03/2017    MAG 2.2 09/05/2017    LIPASE 124 03/23/2018    TSH 2.78 10/19/2023    T4 1.20 05/02/2023    CRP <2.9 09/15/2021    SED 5 09/15/2021       Anesthesia Plan    ASA Status:  3    NPO Status:  NPO Appropriate    Anesthesia Type: General.     - Airway: ETT   Induction: Intravenous.   Maintenance: Balanced.   Techniques and Equipment:     - Airway: Video-Laryngoscope     - Lines/Monitors: 2nd IV, Arterial Line     - Drips/Meds: Remifentanil, Phenylephrine, Ketamine     Consents    Anesthesia Plan(s) and associated risks, benefits, and realistic alternatives discussed. Questions answered and patient/representative(s) expressed understanding.     - Discussed:     - Discussed with:  Patient       Use of blood products discussed: Yes.     - Discussed with: Patient.     - Consented: consented to blood products            Reason for refusal: other.     Postoperative Care    Pain management: IV analgesics, Oral pain medications.   PONV prophylaxis: Ondansetron (or other 5HT-3), Dexamethasone or Solumedrol, Background Propofol Infusion, Aprepitant     Comments:                Warren Orona MD

## 2023-10-24 NOTE — PROGRESS NOTES
PTA medications updated by Medication Scribe prior to surgery via phone call with patient (last doses completed by Nurse)     Medication history sources: Patient, Surescripts, and H&P  In the past week, patient estimated taking medication this percent of the time: Greater than 90%      Significant changes made to the medication list:  Patient reports no longer taking the following meds (med scribe removed from PTA med list): multivitamin      Additional medication history information:   None    Medication reconciliation completed by provider prior to medication history? No    Time spent in this activity: 48 minutes    The information provided in this note is only as accurate as the sources available at the time of update(s)      Prior to Admission medications    Medication Sig Last Dose Taking? Auth Provider Long Term End Date   acetaminophen (TYLENOL) 500 MG tablet Take 500-1,000 mg by mouth every 6 hours as needed for mild pain Unknown at PRN Yes Reported, Patient     cholestyramine (QUESTRAN) 4 g packet Take 1 packet (4 g) by mouth 2 times daily (with meals) 10/24/2023 at PM Yes Kisha Stinson PA-C Yes    diltiazem ER COATED BEADS (CARDIZEM CD/CARTIA XT) 120 MG 24 hr capsule Take 1 capsule (120 mg) by mouth 2 times daily  at AM Yes Kisha Stinson PA-C Yes    levothyroxine (SYNTHROID/LEVOTHROID) 100 MCG tablet Take 1 tablet (100 mcg) by mouth daily  at AM Yes Kisha Stinson PA-C Yes    lisinopril (ZESTRIL) 40 MG tablet Take 1 tablet (40 mg) by mouth daily 10/24/2023 at PM Yes Kisha Stinson PA-C Yes    oxybutynin ER (DITROPAN XL) 10 MG 24 hr tablet Take 1 tablet (10 mg) by mouth daily 10/24/2023 at PM Yes Kisha Stinson PA-C No    pantoprazole (PROTONIX) 40 MG EC tablet Take 1 tablet (40 mg) by mouth 2 times daily 10/24/2023 at PM Yes Kisha Stinson PA-C     polyethylene glycol (MIRALAX) 17 g packet Take 1 packet  by mouth every evening as needed Unknown at PRN Yes Reported, Patient     potassium chloride ER (K-TAB/KLOR-CON) 10 MEQ CR tablet Take 1 tablet (10 mEq) by mouth 3 times daily 10/24/2023 at PM Yes Kisha Stinson PA-C     pravastatin (PRAVACHOL) 40 MG tablet Take 1 tablet (40 mg) by mouth daily 10/24/2023 at PM Yes Kisha Stinson PA-C Yes    blood glucose (ACCU-CHEK GUIDE) test strip Use to test blood sugar 1 times daily or as directed.   Kisha Stinson PA-C     blood glucose monitoring (ACCU-CHEK FASTCLIX) lancets USE TO TEST BLOOD SUGARS ONCE DAILY   Jim Marley PA-C     Incontinence Supply Disposable (PROTECTIVE UNDERWEAR SUPER LG) MISC 3 each daily   Ray Castrejon PA-C     order for DME Equipment being ordered: [employer] shoes and insoles   Chanell Nelson APRN CNP

## 2023-10-25 ENCOUNTER — TRANSFERRED RECORDS (OUTPATIENT)
Dept: HEALTH INFORMATION MANAGEMENT | Facility: CLINIC | Age: 83
End: 2023-10-25

## 2023-10-25 ENCOUNTER — APPOINTMENT (OUTPATIENT)
Dept: GENERAL RADIOLOGY | Facility: CLINIC | Age: 83
DRG: 453 | End: 2023-10-25
Attending: NEUROLOGICAL SURGERY
Payer: COMMERCIAL

## 2023-10-25 ENCOUNTER — APPOINTMENT (OUTPATIENT)
Dept: GENERAL RADIOLOGY | Facility: CLINIC | Age: 83
DRG: 453 | End: 2023-10-25
Attending: INTERNAL MEDICINE
Payer: COMMERCIAL

## 2023-10-25 ENCOUNTER — HOSPITAL ENCOUNTER (INPATIENT)
Facility: CLINIC | Age: 83
LOS: 37 days | Discharge: NURSING, REHAB OR LONG-TERM CARE | DRG: 453 | End: 2023-12-01
Attending: NEUROLOGICAL SURGERY | Admitting: NEUROLOGICAL SURGERY
Payer: COMMERCIAL

## 2023-10-25 ENCOUNTER — ANESTHESIA (OUTPATIENT)
Dept: SURGERY | Facility: CLINIC | Age: 83
DRG: 453 | End: 2023-10-25
Payer: COMMERCIAL

## 2023-10-25 DIAGNOSIS — N17.0 ACUTE KIDNEY FAILURE WITH TUBULAR NECROSIS (H): ICD-10-CM

## 2023-10-25 DIAGNOSIS — G93.40 ENCEPHALOPATHY: Primary | ICD-10-CM

## 2023-10-25 DIAGNOSIS — G47.00 INSOMNIA, UNSPECIFIED TYPE: ICD-10-CM

## 2023-10-25 DIAGNOSIS — K21.9 GASTROESOPHAGEAL REFLUX DISEASE WITHOUT ESOPHAGITIS: ICD-10-CM

## 2023-10-25 DIAGNOSIS — Z98.1 S/P LUMBAR SPINAL FUSION: ICD-10-CM

## 2023-10-25 DIAGNOSIS — I10 ESSENTIAL HYPERTENSION: ICD-10-CM

## 2023-10-25 DIAGNOSIS — L30.4 INTERTRIGO: ICD-10-CM

## 2023-10-25 LAB
ABO/RH(D): NORMAL
ALBUMIN SERPL BCG-MCNC: 2.5 G/DL (ref 3.5–5.2)
ALBUMIN SERPL BCG-MCNC: 3.2 G/DL (ref 3.5–5.2)
ALLEN'S TEST: ABNORMAL
ALP SERPL-CCNC: 42 U/L (ref 35–104)
ALP SERPL-CCNC: 53 U/L (ref 35–104)
ALT SERPL W P-5'-P-CCNC: 176 U/L (ref 0–50)
ALT SERPL W P-5'-P-CCNC: 306 U/L (ref 0–50)
ANION GAP SERPL CALCULATED.3IONS-SCNC: 22 MMOL/L (ref 7–15)
ANION GAP SERPL CALCULATED.3IONS-SCNC: 23 MMOL/L (ref 7–15)
ANTIBODY SCREEN: NEGATIVE
APTT PPP: 32 SECONDS (ref 22–38)
APTT PPP: 33 SECONDS (ref 22–38)
AST SERPL W P-5'-P-CCNC: 233 U/L (ref 0–45)
AST SERPL W P-5'-P-CCNC: 430 U/L (ref 0–45)
BASE EXCESS BLDA CALC-SCNC: -14.3 MMOL/L (ref -9–1.8)
BASE EXCESS BLDA CALC-SCNC: -15.8 MMOL/L (ref -9–1.8)
BASE EXCESS BLDA CALC-SCNC: -17.2 MMOL/L (ref -9–1.8)
BASE EXCESS BLDA CALC-SCNC: -17.5 MMOL/L (ref -9–1.8)
BASOPHILS # BLD AUTO: 0 10E3/UL (ref 0–0.2)
BASOPHILS NFR BLD AUTO: 0 %
BILIRUB SERPL-MCNC: 0.3 MG/DL
BILIRUB SERPL-MCNC: 0.6 MG/DL
BLD PROD TYP BPU: NORMAL
BLOOD COMPONENT TYPE: NORMAL
BUN SERPL-MCNC: 15.4 MG/DL (ref 8–23)
BUN SERPL-MCNC: 17.5 MG/DL (ref 8–23)
CA-I BLD-MCNC: 4.4 MG/DL (ref 4.4–5.2)
CALCIUM SERPL-MCNC: 8.1 MG/DL (ref 8.8–10.2)
CALCIUM SERPL-MCNC: 8.1 MG/DL (ref 8.8–10.2)
CHLORIDE SERPL-SCNC: 113 MMOL/L (ref 98–107)
CHLORIDE SERPL-SCNC: 115 MMOL/L (ref 98–107)
CODING SYSTEM: NORMAL
COHGB MFR BLD: 100 % (ref 92–100)
COHGB MFR BLD: 90 % (ref 92–100)
COHGB MFR BLD: 96 % (ref 92–100)
CPB POCT: NO
CREAT SERPL-MCNC: 1.12 MG/DL (ref 0.51–0.95)
CREAT SERPL-MCNC: 1.13 MG/DL (ref 0.51–0.95)
CREAT SERPL-MCNC: 1.4 MG/DL (ref 0.51–0.95)
CROSSMATCH: NORMAL
DEPRECATED HCO3 PLAS-SCNC: 10 MMOL/L (ref 22–29)
DEPRECATED HCO3 PLAS-SCNC: 8 MMOL/L (ref 22–29)
EGFRCR SERPLBLD CKD-EPI 2021: 38 ML/MIN/1.73M2
EGFRCR SERPLBLD CKD-EPI 2021: 49 ML/MIN/1.73M2
EGFRCR SERPLBLD CKD-EPI 2021: 49 ML/MIN/1.73M2
EOSINOPHIL # BLD AUTO: 0 10E3/UL (ref 0–0.7)
EOSINOPHIL NFR BLD AUTO: 0 %
ERYTHROCYTE [DISTWIDTH] IN BLOOD BY AUTOMATED COUNT: 13 % (ref 10–15)
ERYTHROCYTE [DISTWIDTH] IN BLOOD BY AUTOMATED COUNT: 13.8 % (ref 10–15)
ERYTHROCYTE [DISTWIDTH] IN BLOOD BY AUTOMATED COUNT: 14.5 % (ref 10–15)
FIBRINOGEN PPP-MCNC: 101 MG/DL (ref 170–490)
FIBRINOGEN PPP-MCNC: 188 MG/DL (ref 170–490)
FIBRINOGEN PPP-MCNC: 92 MG/DL (ref 170–490)
GLUCOSE BLDC GLUCOMTR-MCNC: 165 MG/DL (ref 70–99)
GLUCOSE BLDC GLUCOMTR-MCNC: 183 MG/DL (ref 70–99)
GLUCOSE BLDC GLUCOMTR-MCNC: 192 MG/DL (ref 70–99)
GLUCOSE BLDC GLUCOMTR-MCNC: 216 MG/DL (ref 70–99)
GLUCOSE BLDC GLUCOMTR-MCNC: 263 MG/DL (ref 70–99)
GLUCOSE BLDC GLUCOMTR-MCNC: 271 MG/DL (ref 70–99)
GLUCOSE SERPL-MCNC: 129 MG/DL (ref 70–99)
GLUCOSE SERPL-MCNC: 248 MG/DL (ref 70–99)
GLUCOSE SERPL-MCNC: 366 MG/DL (ref 70–99)
HBA1C MFR BLD: 6.3 %
HCO3 BLD-SCNC: 10 MMOL/L (ref 21–28)
HCO3 BLD-SCNC: 12 MMOL/L (ref 21–28)
HCO3 BLD-SCNC: 9 MMOL/L (ref 21–28)
HCO3 BLD-SCNC: 9 MMOL/L (ref 21–28)
HCO3 BLDA-SCNC: 10 MMOL/L (ref 21–28)
HCO3 BLDA-SCNC: 12 MMOL/L (ref 21–28)
HCO3 BLDA-SCNC: 12 MMOL/L (ref 21–28)
HCO3 BLDA-SCNC: 18 MMOL/L (ref 21–28)
HCO3 BLDA-SCNC: 20 MMOL/L (ref 21–28)
HCT VFR BLD AUTO: 23.5 % (ref 35–47)
HCT VFR BLD AUTO: 33.3 % (ref 35–47)
HCT VFR BLD AUTO: 34.6 % (ref 35–47)
HCT VFR BLD CALC: 17 % (ref 35–47)
HCT VFR BLD CALC: 24 % (ref 35–47)
HCT VFR BLD CALC: 25 % (ref 35–47)
HCT VFR BLD CALC: 25 % (ref 35–47)
HCT VFR BLD CALC: 28 % (ref 35–47)
HGB BLD-MCNC: 10.4 G/DL (ref 11.7–15.7)
HGB BLD-MCNC: 11.6 G/DL (ref 11.7–15.7)
HGB BLD-MCNC: 5.8 G/DL (ref 11.7–15.7)
HGB BLD-MCNC: 7.7 G/DL (ref 11.7–15.7)
HGB BLD-MCNC: 8.2 G/DL (ref 11.7–15.7)
HGB BLD-MCNC: 8.5 G/DL (ref 11.7–15.7)
HGB BLD-MCNC: 8.5 G/DL (ref 11.7–15.7)
HGB BLD-MCNC: 9.5 G/DL (ref 11.7–15.7)
IMM GRANULOCYTES # BLD: 0.1 10E3/UL
IMM GRANULOCYTES NFR BLD: 1 %
INR PPP: 1.62 (ref 0.85–1.15)
INR PPP: 1.73 (ref 0.85–1.15)
INR PPP: 2.11 (ref 0.85–1.15)
ISSUE DATE AND TIME: NORMAL
LACTATE SERPL-SCNC: 10.5 MMOL/L (ref 0.7–2)
LACTATE SERPL-SCNC: 12.1 MMOL/L (ref 0.7–2)
LACTATE SERPL-SCNC: 9.5 MMOL/L (ref 0.7–2)
LYMPHOCYTES # BLD AUTO: 1.3 10E3/UL (ref 0.8–5.3)
LYMPHOCYTES NFR BLD AUTO: 17 %
MAGNESIUM SERPL-MCNC: 1.5 MG/DL (ref 1.7–2.3)
MCH RBC QN AUTO: 29.3 PG (ref 26.5–33)
MCH RBC QN AUTO: 29.9 PG (ref 26.5–33)
MCH RBC QN AUTO: 31.6 PG (ref 26.5–33)
MCHC RBC AUTO-ENTMCNC: 31.2 G/DL (ref 31.5–36.5)
MCHC RBC AUTO-ENTMCNC: 32.8 G/DL (ref 31.5–36.5)
MCHC RBC AUTO-ENTMCNC: 33.5 G/DL (ref 31.5–36.5)
MCV RBC AUTO: 89 FL (ref 78–100)
MCV RBC AUTO: 94 FL (ref 78–100)
MCV RBC AUTO: 96 FL (ref 78–100)
MONOCYTES # BLD AUTO: 0.3 10E3/UL (ref 0–1.3)
MONOCYTES NFR BLD AUTO: 4 %
NEUTROPHILS # BLD AUTO: 6 10E3/UL (ref 1.6–8.3)
NEUTROPHILS NFR BLD AUTO: 78 %
NRBC # BLD AUTO: 0 10E3/UL
NRBC BLD AUTO-RTO: 0 /100
O2/TOTAL GAS SETTING VFR VENT: 30 %
O2/TOTAL GAS SETTING VFR VENT: 30 %
O2/TOTAL GAS SETTING VFR VENT: 50 %
O2/TOTAL GAS SETTING VFR VENT: 50 %
OXYHGB MFR BLD: 98 % (ref 92–100)
PCO2 BLD: 24 MM HG (ref 35–45)
PCO2 BLD: 25 MM HG (ref 35–45)
PCO2 BLD: 25 MM HG (ref 35–45)
PCO2 BLD: 30 MM HG (ref 35–45)
PCO2 BLDA: 34 MM HG (ref 35–45)
PCO2 BLDA: 41 MM HG (ref 35–45)
PCO2 BLDA: 42 MM HG (ref 35–45)
PCO2 BLDA: 47 MM HG (ref 35–45)
PCO2 BLDA: 56 MM HG (ref 35–45)
PH BLD: 7.18 [PH] (ref 7.35–7.45)
PH BLD: 7.19 [PH] (ref 7.35–7.45)
PH BLD: 7.21 [PH] (ref 7.35–7.45)
PH BLD: 7.23 [PH] (ref 7.35–7.45)
PH BLDA: 6.94 [PH] (ref 7.35–7.45)
PH BLDA: 7 [PH] (ref 7.35–7.45)
PH BLDA: 7.08 [PH] (ref 7.35–7.45)
PH BLDA: 7.26 [PH] (ref 7.35–7.45)
PH BLDA: 7.28 [PH] (ref 7.35–7.45)
PHOSPHATE SERPL-MCNC: 8.1 MG/DL (ref 2.5–4.5)
PLATELET # BLD AUTO: 118 10E3/UL (ref 150–450)
PLATELET # BLD AUTO: 61 10E3/UL (ref 150–450)
PLATELET # BLD AUTO: 87 10E3/UL (ref 150–450)
PO2 BLD: 113 MM HG (ref 80–105)
PO2 BLD: 133 MM HG (ref 80–105)
PO2 BLD: 176 MM HG (ref 80–105)
PO2 BLD: 188 MM HG (ref 80–105)
PO2 BLDA: 134 MM HG (ref 80–105)
PO2 BLDA: 206 MM HG (ref 80–105)
PO2 BLDA: 231 MM HG (ref 80–105)
PO2 BLDA: 288 MM HG (ref 80–105)
PO2 BLDA: 89 MM HG (ref 80–105)
POTASSIUM BLD-SCNC: 4 MMOL/L (ref 3.4–5.3)
POTASSIUM BLD-SCNC: 4.1 MMOL/L (ref 3.4–5.3)
POTASSIUM BLD-SCNC: 4.7 MMOL/L (ref 3.4–5.3)
POTASSIUM BLD-SCNC: 4.7 MMOL/L (ref 3.4–5.3)
POTASSIUM BLD-SCNC: 5.1 MMOL/L (ref 3.4–5.3)
POTASSIUM SERPL-SCNC: 3.9 MMOL/L (ref 3.4–5.3)
POTASSIUM SERPL-SCNC: 4.1 MMOL/L (ref 3.4–5.3)
POTASSIUM SERPL-SCNC: 4.9 MMOL/L (ref 3.4–5.3)
PROT SERPL-MCNC: 3.6 G/DL (ref 6.4–8.3)
PROT SERPL-MCNC: 4.6 G/DL (ref 6.4–8.3)
RBC # BLD AUTO: 2.44 10E6/UL (ref 3.8–5.2)
RBC # BLD AUTO: 3.55 10E6/UL (ref 3.8–5.2)
RBC # BLD AUTO: 3.88 10E6/UL (ref 3.8–5.2)
SODIUM BLD-SCNC: 140 MMOL/L (ref 133–144)
SODIUM BLD-SCNC: 141 MMOL/L (ref 133–144)
SODIUM BLD-SCNC: 141 MMOL/L (ref 133–144)
SODIUM BLD-SCNC: 142 MMOL/L (ref 133–144)
SODIUM BLD-SCNC: 143 MMOL/L (ref 133–144)
SODIUM SERPL-SCNC: 145 MMOL/L (ref 135–145)
SODIUM SERPL-SCNC: 146 MMOL/L (ref 135–145)
SPECIMEN EXPIRATION DATE: NORMAL
TROPONIN T SERPL HS-MCNC: 181 NG/L
TROPONIN T SERPL HS-MCNC: 57 NG/L
UNIT ABO/RH: NORMAL
UNIT NUMBER: NORMAL
UNIT STATUS: NORMAL
UNIT TYPE ISBT: 5100
UNIT TYPE ISBT: 6200
UNIT TYPE ISBT: 7300
UNIT TYPE ISBT: 7300
WBC # BLD AUTO: 12.5 10E3/UL (ref 4–11)
WBC # BLD AUTO: 16.6 10E3/UL (ref 4–11)
WBC # BLD AUTO: 7.7 10E3/UL (ref 4–11)

## 2023-10-25 PROCEDURE — 999N000157 HC STATISTIC RCP TIME EA 10 MIN

## 2023-10-25 PROCEDURE — 3E043XZ INTRODUCTION OF VASOPRESSOR INTO CENTRAL VEIN, PERCUTANEOUS APPROACH: ICD-10-PCS | Performed by: NEUROLOGICAL SURGERY

## 2023-10-25 PROCEDURE — 99291 CRITICAL CARE FIRST HOUR: CPT | Performed by: INTERNAL MEDICINE

## 2023-10-25 PROCEDURE — 250N000011 HC RX IP 250 OP 636: Mod: JZ

## 2023-10-25 PROCEDURE — 0PS404Z REPOSITION THORACIC VERTEBRA WITH INTERNAL FIXATION DEVICE, OPEN APPROACH: ICD-10-PCS | Performed by: NEUROLOGICAL SURGERY

## 2023-10-25 PROCEDURE — 85027 COMPLETE CBC AUTOMATED: CPT | Performed by: INTERNAL MEDICINE

## 2023-10-25 PROCEDURE — 84155 ASSAY OF PROTEIN SERUM: CPT

## 2023-10-25 PROCEDURE — 250N000013 HC RX MED GY IP 250 OP 250 PS 637: Performed by: INTERNAL MEDICINE

## 2023-10-25 PROCEDURE — 0RG60K1 FUSION OF THORACIC VERTEBRAL JOINT WITH NONAUTOLOGOUS TISSUE SUBSTITUTE, POSTERIOR APPROACH, POSTERIOR COLUMN, OPEN APPROACH: ICD-10-PCS | Performed by: NEUROLOGICAL SURGERY

## 2023-10-25 PROCEDURE — 999N000179 XR SURGERY CARM FLUORO LESS THAN 5 MIN W STILLS

## 2023-10-25 PROCEDURE — L8699 PROSTHETIC IMPLANT NOS: HCPCS | Performed by: NEUROLOGICAL SURGERY

## 2023-10-25 PROCEDURE — 85610 PROTHROMBIN TIME: CPT | Performed by: INTERNAL MEDICINE

## 2023-10-25 PROCEDURE — 4A1104G MONITORING OF PERIPHERAL NERVOUS ELECTRICAL ACTIVITY, INTRAOPERATIVE, OPEN APPROACH: ICD-10-PCS | Performed by: NEUROLOGICAL SURGERY

## 2023-10-25 PROCEDURE — 82247 BILIRUBIN TOTAL: CPT | Performed by: INTERNAL MEDICINE

## 2023-10-25 PROCEDURE — P9016 RBC LEUKOCYTES REDUCED: HCPCS

## 2023-10-25 PROCEDURE — 85384 FIBRINOGEN ACTIVITY: CPT | Performed by: INTERNAL MEDICINE

## 2023-10-25 PROCEDURE — 0QS004Z REPOSITION LUMBAR VERTEBRA WITH INTERNAL FIXATION DEVICE, OPEN APPROACH: ICD-10-PCS | Performed by: NEUROLOGICAL SURGERY

## 2023-10-25 PROCEDURE — 250N000013 HC RX MED GY IP 250 OP 250 PS 637: Performed by: NURSE PRACTITIONER

## 2023-10-25 PROCEDURE — 250N000009 HC RX 250: Performed by: ANESTHESIOLOGY

## 2023-10-25 PROCEDURE — 250N000011 HC RX IP 250 OP 636: Mod: JZ | Performed by: NURSE PRACTITIONER

## 2023-10-25 PROCEDURE — 258N000003 HC RX IP 258 OP 636: Performed by: INTERNAL MEDICINE

## 2023-10-25 PROCEDURE — 250N000009 HC RX 250

## 2023-10-25 PROCEDURE — 82247 BILIRUBIN TOTAL: CPT

## 2023-10-25 PROCEDURE — 84132 ASSAY OF SERUM POTASSIUM: CPT

## 2023-10-25 PROCEDURE — 258N000003 HC RX IP 258 OP 636: Performed by: REGISTERED NURSE

## 2023-10-25 PROCEDURE — C1713 ANCHOR/SCREW BN/BN,TIS/BN: HCPCS | Performed by: NEUROLOGICAL SURGERY

## 2023-10-25 PROCEDURE — 93005 ELECTROCARDIOGRAM TRACING: CPT

## 2023-10-25 PROCEDURE — 999N000141 HC STATISTIC PRE-PROCEDURE NURSING ASSESSMENT: Performed by: NEUROLOGICAL SURGERY

## 2023-10-25 PROCEDURE — 250N000025 HC SEVOFLURANE, PER MIN: Performed by: NEUROLOGICAL SURGERY

## 2023-10-25 PROCEDURE — 85384 FIBRINOGEN ACTIVITY: CPT | Performed by: NEUROLOGICAL SURGERY

## 2023-10-25 PROCEDURE — 272N000001 HC OR GENERAL SUPPLY STERILE: Performed by: NEUROLOGICAL SURGERY

## 2023-10-25 PROCEDURE — P9016 RBC LEUKOCYTES REDUCED: HCPCS | Performed by: ANESTHESIOLOGY

## 2023-10-25 PROCEDURE — 258N000003 HC RX IP 258 OP 636: Performed by: NURSE ANESTHETIST, CERTIFIED REGISTERED

## 2023-10-25 PROCEDURE — 999N000063 XR ABDOMEN PORT 1 VIEW

## 2023-10-25 PROCEDURE — 5A1955Z RESPIRATORY VENTILATION, GREATER THAN 96 CONSECUTIVE HOURS: ICD-10-PCS | Performed by: NEUROLOGICAL SURGERY

## 2023-10-25 PROCEDURE — 86923 COMPATIBILITY TEST ELECTRIC: CPT | Performed by: INTERNAL MEDICINE

## 2023-10-25 PROCEDURE — 82565 ASSAY OF CREATININE: CPT | Performed by: ANESTHESIOLOGY

## 2023-10-25 PROCEDURE — 4A1034Z MONITORING OF CENTRAL NERVOUS ELECTRICAL ACTIVITY, PERCUTANEOUS APPROACH: ICD-10-PCS | Performed by: NEUROLOGICAL SURGERY

## 2023-10-25 PROCEDURE — 0SG10K1 FUSION OF 2 OR MORE LUMBAR VERTEBRAL JOINTS WITH NONAUTOLOGOUS TISSUE SUBSTITUTE, POSTERIOR APPROACH, POSTERIOR COLUMN, OPEN APPROACH: ICD-10-PCS | Performed by: NEUROLOGICAL SURGERY

## 2023-10-25 PROCEDURE — 250N000011 HC RX IP 250 OP 636: Performed by: NEUROLOGICAL SURGERY

## 2023-10-25 PROCEDURE — C1762 CONN TISS, HUMAN(INC FASCIA): HCPCS | Performed by: NEUROLOGICAL SURGERY

## 2023-10-25 PROCEDURE — 370N000017 HC ANESTHESIA TECHNICAL FEE, PER MIN: Performed by: NEUROLOGICAL SURGERY

## 2023-10-25 PROCEDURE — 85610 PROTHROMBIN TIME: CPT | Performed by: NEUROLOGICAL SURGERY

## 2023-10-25 PROCEDURE — 84100 ASSAY OF PHOSPHORUS: CPT | Performed by: INTERNAL MEDICINE

## 2023-10-25 PROCEDURE — 360N000087 HC SURGERY LEVEL 7 W/ FLUORO, PER MIN: Performed by: NEUROLOGICAL SURGERY

## 2023-10-25 PROCEDURE — 82947 ASSAY GLUCOSE BLOOD QUANT: CPT | Performed by: ANESTHESIOLOGY

## 2023-10-25 PROCEDURE — 8E0W0CZ ROBOTIC ASSISTED PROCEDURE OF TRUNK REGION, OPEN APPROACH: ICD-10-PCS | Performed by: NEUROLOGICAL SURGERY

## 2023-10-25 PROCEDURE — 86923 COMPATIBILITY TEST ELECTRIC: CPT

## 2023-10-25 PROCEDURE — 86900 BLOOD TYPING SEROLOGIC ABO: CPT | Performed by: ANESTHESIOLOGY

## 2023-10-25 PROCEDURE — 80053 COMPREHEN METABOLIC PANEL: CPT | Performed by: ANESTHESIOLOGY

## 2023-10-25 PROCEDURE — 250N000011 HC RX IP 250 OP 636

## 2023-10-25 PROCEDURE — 85025 COMPLETE CBC W/AUTO DIFF WBC: CPT | Performed by: NEUROLOGICAL SURGERY

## 2023-10-25 PROCEDURE — 83036 HEMOGLOBIN GLYCOSYLATED A1C: CPT | Performed by: INTERNAL MEDICINE

## 2023-10-25 PROCEDURE — 250N000011 HC RX IP 250 OP 636: Performed by: NURSE PRACTITIONER

## 2023-10-25 PROCEDURE — 258N000003 HC RX IP 258 OP 636: Performed by: ANESTHESIOLOGY

## 2023-10-25 PROCEDURE — 94002 VENT MGMT INPAT INIT DAY: CPT

## 2023-10-25 PROCEDURE — P9059 PLASMA, FRZ BETWEEN 8-24HOUR: HCPCS

## 2023-10-25 PROCEDURE — 84484 ASSAY OF TROPONIN QUANT: CPT | Performed by: INTERNAL MEDICINE

## 2023-10-25 PROCEDURE — 83735 ASSAY OF MAGNESIUM: CPT | Performed by: INTERNAL MEDICINE

## 2023-10-25 PROCEDURE — 250N000009 HC RX 250: Performed by: NEUROLOGICAL SURGERY

## 2023-10-25 PROCEDURE — 83605 ASSAY OF LACTIC ACID: CPT | Performed by: INTERNAL MEDICINE

## 2023-10-25 PROCEDURE — 200N000001 HC R&B ICU

## 2023-10-25 PROCEDURE — 85730 THROMBOPLASTIN TIME PARTIAL: CPT | Performed by: INTERNAL MEDICINE

## 2023-10-25 PROCEDURE — 01NB0ZZ RELEASE LUMBAR NERVE, OPEN APPROACH: ICD-10-PCS | Performed by: NEUROLOGICAL SURGERY

## 2023-10-25 PROCEDURE — 85730 THROMBOPLASTIN TIME PARTIAL: CPT | Performed by: NEUROLOGICAL SURGERY

## 2023-10-25 PROCEDURE — 250N000009 HC RX 250: Performed by: NURSE ANESTHETIST, CERTIFIED REGISTERED

## 2023-10-25 PROCEDURE — P9012 CRYOPRECIPITATE EACH UNIT: HCPCS | Performed by: INTERNAL MEDICINE

## 2023-10-25 PROCEDURE — 250N000009 HC RX 250: Performed by: REGISTERED NURSE

## 2023-10-25 PROCEDURE — 82805 BLOOD GASES W/O2 SATURATION: CPT | Performed by: INTERNAL MEDICINE

## 2023-10-25 PROCEDURE — 258N000003 HC RX IP 258 OP 636

## 2023-10-25 PROCEDURE — 82803 BLOOD GASES ANY COMBINATION: CPT

## 2023-10-25 PROCEDURE — 999N000065 XR CHEST PORT 1 VIEW

## 2023-10-25 PROCEDURE — 86923 COMPATIBILITY TEST ELECTRIC: CPT | Performed by: ANESTHESIOLOGY

## 2023-10-25 PROCEDURE — 0SG30K1 FUSION OF LUMBOSACRAL JOINT WITH NONAUTOLOGOUS TISSUE SUBSTITUTE, POSTERIOR APPROACH, POSTERIOR COLUMN, OPEN APPROACH: ICD-10-PCS | Performed by: NEUROLOGICAL SURGERY

## 2023-10-25 PROCEDURE — 250N000009 HC RX 250: Performed by: INTERNAL MEDICINE

## 2023-10-25 PROCEDURE — 272N000282 HC OR IOM SUPPLIES OPNP: Performed by: NEUROLOGICAL SURGERY

## 2023-10-25 PROCEDURE — 0ST20ZZ RESECTION OF LUMBAR VERTEBRAL DISC, OPEN APPROACH: ICD-10-PCS | Performed by: NEUROLOGICAL SURGERY

## 2023-10-25 PROCEDURE — 0SG10AJ FUSION OF 2 OR MORE LUMBAR VERTEBRAL JOINTS WITH INTERBODY FUSION DEVICE, POSTERIOR APPROACH, ANTERIOR COLUMN, OPEN APPROACH: ICD-10-PCS | Performed by: NEUROLOGICAL SURGERY

## 2023-10-25 PROCEDURE — 258N000003 HC RX IP 258 OP 636: Performed by: NURSE PRACTITIONER

## 2023-10-25 PROCEDURE — 93010 ELECTROCARDIOGRAM REPORT: CPT | Performed by: INTERNAL MEDICINE

## 2023-10-25 PROCEDURE — 0RGA0K1 FUSION OF THORACOLUMBAR VERTEBRAL JOINT WITH NONAUTOLOGOUS TISSUE SUBSTITUTE, POSTERIOR APPROACH, POSTERIOR COLUMN, OPEN APPROACH: ICD-10-PCS | Performed by: NEUROLOGICAL SURGERY

## 2023-10-25 PROCEDURE — 36415 COLL VENOUS BLD VENIPUNCTURE: CPT | Performed by: ANESTHESIOLOGY

## 2023-10-25 PROCEDURE — 82330 ASSAY OF CALCIUM: CPT | Performed by: INTERNAL MEDICINE

## 2023-10-25 PROCEDURE — 82803 BLOOD GASES ANY COMBINATION: CPT | Performed by: INTERNAL MEDICINE

## 2023-10-25 PROCEDURE — P9045 ALBUMIN (HUMAN), 5%, 250 ML: HCPCS | Mod: JZ

## 2023-10-25 PROCEDURE — 922N000001 HC NEURO MONITORING SERVICE, UP TO 7 HOURS (T1FEE): Performed by: NEUROLOGICAL SURGERY

## 2023-10-25 PROCEDURE — 250N000011 HC RX IP 250 OP 636: Mod: JZ | Performed by: INTERNAL MEDICINE

## 2023-10-25 PROCEDURE — 250N000012 HC RX MED GY IP 250 OP 636 PS 637: Performed by: ANESTHESIOLOGY

## 2023-10-25 DEVICE — IMP SPI INTERBODY MEDT CATALYFT PL SHORT 7MM 6068073: Type: IMPLANTABLE DEVICE | Site: SPINE LUMBAR | Status: FUNCTIONAL

## 2023-10-25 DEVICE — GRAFT BONE INFUSE BMP LG 7510600: Type: IMPLANTABLE DEVICE | Site: SPINE LUMBAR | Status: FUNCTIONAL

## 2023-10-25 DEVICE — BOLT CANNULATED 8.5X70MM: Type: IMPLANTABLE DEVICE | Status: FUNCTIONAL

## 2023-10-25 DEVICE — MAGNETOS EASYPACK PUTTY 15CC 1-2MM USA
Type: IMPLANTABLE DEVICE | Site: SPINE LUMBAR | Status: FUNCTIONAL
Brand: MAGNETOS

## 2023-10-25 DEVICE — GRAFT BONE FIBERS DBF 9ML INJ T50309 PRELOADED: Type: IMPLANTABLE DEVICE | Site: SPINE LUMBAR | Status: FUNCTIONAL

## 2023-10-25 RX ORDER — HYDROMORPHONE HCL IN WATER/PF 6 MG/30 ML
0.2 PATIENT CONTROLLED ANALGESIA SYRINGE INTRAVENOUS
Status: DISCONTINUED | OUTPATIENT
Start: 2023-10-25 | End: 2023-10-25

## 2023-10-25 RX ORDER — BISACODYL 10 MG
10 SUPPOSITORY, RECTAL RECTAL DAILY PRN
Status: DISCONTINUED | OUTPATIENT
Start: 2023-10-25 | End: 2023-12-01 | Stop reason: HOSPADM

## 2023-10-25 RX ORDER — VASOPRESSIN IN 0.9 % NACL 2 UNIT/2ML
SYRINGE (ML) INTRAVENOUS PRN
Status: DISCONTINUED | OUTPATIENT
Start: 2023-10-25 | End: 2023-10-25

## 2023-10-25 RX ORDER — NOREPINEPHRINE BITARTRATE 0.02 MG/ML
INJECTION, SOLUTION INTRAVENOUS CONTINUOUS PRN
Status: DISCONTINUED | OUTPATIENT
Start: 2023-10-25 | End: 2023-10-25

## 2023-10-25 RX ORDER — NICOTINE POLACRILEX 4 MG
15-30 LOZENGE BUCCAL
Status: DISCONTINUED | OUTPATIENT
Start: 2023-10-25 | End: 2023-10-29

## 2023-10-25 RX ORDER — APREPITANT 40 MG/1
40 CAPSULE ORAL ONCE
Status: COMPLETED | OUTPATIENT
Start: 2023-10-25 | End: 2023-10-25

## 2023-10-25 RX ORDER — LIDOCAINE HYDROCHLORIDE 20 MG/ML
INJECTION, SOLUTION INFILTRATION; PERINEURAL PRN
Status: DISCONTINUED | OUTPATIENT
Start: 2023-10-25 | End: 2023-10-25

## 2023-10-25 RX ORDER — DEXMEDETOMIDINE HYDROCHLORIDE 4 UG/ML
INJECTION, SOLUTION INTRAVENOUS PRN
Status: DISCONTINUED | OUTPATIENT
Start: 2023-10-25 | End: 2023-10-25

## 2023-10-25 RX ORDER — ONDANSETRON 2 MG/ML
4 INJECTION INTRAMUSCULAR; INTRAVENOUS EVERY 6 HOURS PRN
Status: DISCONTINUED | OUTPATIENT
Start: 2023-10-25 | End: 2023-12-01 | Stop reason: HOSPADM

## 2023-10-25 RX ORDER — GLYCOPYRROLATE 0.2 MG/ML
INJECTION, SOLUTION INTRAMUSCULAR; INTRAVENOUS PRN
Status: DISCONTINUED | OUTPATIENT
Start: 2023-10-25 | End: 2023-10-25

## 2023-10-25 RX ORDER — CEFAZOLIN SODIUM/WATER 2 G/20 ML
2 SYRINGE (ML) INTRAVENOUS SEE ADMIN INSTRUCTIONS
Status: DISCONTINUED | OUTPATIENT
Start: 2023-10-25 | End: 2023-10-25

## 2023-10-25 RX ORDER — DEXTROSE MONOHYDRATE 100 MG/ML
INJECTION, SOLUTION INTRAVENOUS CONTINUOUS PRN
Status: DISCONTINUED | OUTPATIENT
Start: 2023-10-25 | End: 2023-11-04

## 2023-10-25 RX ORDER — EPHEDRINE SULFATE 50 MG/ML
INJECTION, SOLUTION INTRAMUSCULAR; INTRAVENOUS; SUBCUTANEOUS PRN
Status: DISCONTINUED | OUTPATIENT
Start: 2023-10-25 | End: 2023-10-25

## 2023-10-25 RX ORDER — NALOXONE HYDROCHLORIDE 0.4 MG/ML
0.4 INJECTION, SOLUTION INTRAMUSCULAR; INTRAVENOUS; SUBCUTANEOUS
Status: DISCONTINUED | OUTPATIENT
Start: 2023-10-25 | End: 2023-12-01 | Stop reason: HOSPADM

## 2023-10-25 RX ORDER — SODIUM CHLORIDE 9 MG/ML
INJECTION, SOLUTION INTRAVENOUS CONTINUOUS
Status: DISCONTINUED | OUTPATIENT
Start: 2023-10-25 | End: 2023-10-26

## 2023-10-25 RX ORDER — HYDROMORPHONE HCL IN WATER/PF 6 MG/30 ML
0.1 PATIENT CONTROLLED ANALGESIA SYRINGE INTRAVENOUS
Status: DISCONTINUED | OUTPATIENT
Start: 2023-10-25 | End: 2023-10-25

## 2023-10-25 RX ORDER — PROPOFOL 10 MG/ML
INJECTION, EMULSION INTRAVENOUS CONTINUOUS PRN
Status: DISCONTINUED | OUTPATIENT
Start: 2023-10-25 | End: 2023-10-25

## 2023-10-25 RX ORDER — CEFAZOLIN SODIUM/WATER 2 G/20 ML
2 SYRINGE (ML) INTRAVENOUS
Status: COMPLETED | OUTPATIENT
Start: 2023-10-25 | End: 2023-10-25

## 2023-10-25 RX ORDER — ACETAMINOPHEN 325 MG/1
975 TABLET ORAL EVERY 8 HOURS
Status: DISPENSED | OUTPATIENT
Start: 2023-10-25 | End: 2023-10-28

## 2023-10-25 RX ORDER — DIPHENHYDRAMINE HYDROCHLORIDE 50 MG/ML
INJECTION INTRAMUSCULAR; INTRAVENOUS PRN
Status: DISCONTINUED | OUTPATIENT
Start: 2023-10-25 | End: 2023-10-25

## 2023-10-25 RX ORDER — LIDOCAINE 40 MG/G
CREAM TOPICAL
Status: DISCONTINUED | OUTPATIENT
Start: 2023-10-25 | End: 2023-11-08

## 2023-10-25 RX ORDER — PROPOFOL 10 MG/ML
5-75 INJECTION, EMULSION INTRAVENOUS CONTINUOUS
Status: DISCONTINUED | OUTPATIENT
Start: 2023-10-25 | End: 2023-11-01

## 2023-10-25 RX ORDER — PROCHLORPERAZINE MALEATE 5 MG
5 TABLET ORAL EVERY 6 HOURS PRN
Status: DISCONTINUED | OUTPATIENT
Start: 2023-10-25 | End: 2023-12-01 | Stop reason: HOSPADM

## 2023-10-25 RX ORDER — HYDROMORPHONE HCL IN WATER/PF 6 MG/30 ML
0.1 PATIENT CONTROLLED ANALGESIA SYRINGE INTRAVENOUS
Status: DISCONTINUED | OUTPATIENT
Start: 2023-10-25 | End: 2023-10-26

## 2023-10-25 RX ORDER — GABAPENTIN 100 MG/1
100 CAPSULE ORAL
Status: COMPLETED | OUTPATIENT
Start: 2023-10-25 | End: 2023-10-25

## 2023-10-25 RX ORDER — CALCIUM CARBONATE 500 MG/1
500 TABLET, CHEWABLE ORAL 4 TIMES DAILY PRN
Status: DISCONTINUED | OUTPATIENT
Start: 2023-10-25 | End: 2023-12-01 | Stop reason: HOSPADM

## 2023-10-25 RX ORDER — OXYCODONE HYDROCHLORIDE 5 MG/1
5 TABLET ORAL EVERY 4 HOURS PRN
Status: DISCONTINUED | OUTPATIENT
Start: 2023-10-25 | End: 2023-12-01 | Stop reason: HOSPADM

## 2023-10-25 RX ORDER — PHENYLEPHRINE HYDROCHLORIDE 10 MG/ML
INJECTION INTRAVENOUS PRN
Status: DISCONTINUED | OUTPATIENT
Start: 2023-10-25 | End: 2023-10-25

## 2023-10-25 RX ORDER — NALOXONE HYDROCHLORIDE 0.4 MG/ML
0.2 INJECTION, SOLUTION INTRAMUSCULAR; INTRAVENOUS; SUBCUTANEOUS
Status: DISCONTINUED | OUTPATIENT
Start: 2023-10-25 | End: 2023-12-01 | Stop reason: HOSPADM

## 2023-10-25 RX ORDER — CEFAZOLIN SODIUM 2 G/100ML
2 INJECTION, SOLUTION INTRAVENOUS EVERY 8 HOURS
Status: DISCONTINUED | OUTPATIENT
Start: 2023-10-25 | End: 2023-10-26

## 2023-10-25 RX ORDER — METHOCARBAMOL 750 MG/1
750 TABLET, FILM COATED ORAL EVERY 6 HOURS PRN
Status: DISCONTINUED | OUTPATIENT
Start: 2023-10-25 | End: 2023-11-02

## 2023-10-25 RX ORDER — FENTANYL CITRATE 50 UG/ML
25 INJECTION, SOLUTION INTRAMUSCULAR; INTRAVENOUS EVERY 5 MIN PRN
Status: DISCONTINUED | OUTPATIENT
Start: 2023-10-25 | End: 2023-10-25

## 2023-10-25 RX ORDER — BUPIVACAINE HYDROCHLORIDE AND EPINEPHRINE 5; 5 MG/ML; UG/ML
INJECTION, SOLUTION PERINEURAL PRN
Status: DISCONTINUED | OUTPATIENT
Start: 2023-10-25 | End: 2023-10-25 | Stop reason: HOSPADM

## 2023-10-25 RX ORDER — DEXAMETHASONE SODIUM PHOSPHATE 4 MG/ML
INJECTION, SOLUTION INTRA-ARTICULAR; INTRALESIONAL; INTRAMUSCULAR; INTRAVENOUS; SOFT TISSUE PRN
Status: DISCONTINUED | OUTPATIENT
Start: 2023-10-25 | End: 2023-10-25

## 2023-10-25 RX ORDER — ONDANSETRON 4 MG/1
4 TABLET, ORALLY DISINTEGRATING ORAL EVERY 6 HOURS PRN
Status: DISCONTINUED | OUTPATIENT
Start: 2023-10-25 | End: 2023-12-01 | Stop reason: HOSPADM

## 2023-10-25 RX ORDER — HYDRALAZINE HYDROCHLORIDE 20 MG/ML
2.5-5 INJECTION INTRAMUSCULAR; INTRAVENOUS EVERY 10 MIN PRN
Status: DISCONTINUED | OUTPATIENT
Start: 2023-10-25 | End: 2023-10-25

## 2023-10-25 RX ORDER — CALCIUM CHLORIDE 100 MG/ML
INJECTION INTRAVENOUS; INTRAVENTRICULAR PRN
Status: DISCONTINUED | OUTPATIENT
Start: 2023-10-25 | End: 2023-10-25

## 2023-10-25 RX ORDER — FENTANYL CITRATE 50 UG/ML
INJECTION, SOLUTION INTRAMUSCULAR; INTRAVENOUS PRN
Status: DISCONTINUED | OUTPATIENT
Start: 2023-10-25 | End: 2023-10-25

## 2023-10-25 RX ORDER — LIDOCAINE 40 MG/G
CREAM TOPICAL
Status: DISCONTINUED | OUTPATIENT
Start: 2023-10-25 | End: 2023-10-25

## 2023-10-25 RX ORDER — DEXTROSE MONOHYDRATE 25 G/50ML
25-50 INJECTION, SOLUTION INTRAVENOUS
Status: DISCONTINUED | OUTPATIENT
Start: 2023-10-25 | End: 2023-10-29

## 2023-10-25 RX ORDER — LIDOCAINE HYDROCHLORIDE 10 MG/ML
INJECTION, SOLUTION INFILTRATION; PERINEURAL
Status: COMPLETED | OUTPATIENT
Start: 2023-10-25 | End: 2023-10-25

## 2023-10-25 RX ORDER — LABETALOL HYDROCHLORIDE 5 MG/ML
10 INJECTION, SOLUTION INTRAVENOUS
Status: DISCONTINUED | OUTPATIENT
Start: 2023-10-25 | End: 2023-10-25

## 2023-10-25 RX ORDER — PROPOFOL 10 MG/ML
INJECTION, EMULSION INTRAVENOUS PRN
Status: DISCONTINUED | OUTPATIENT
Start: 2023-10-25 | End: 2023-10-25

## 2023-10-25 RX ORDER — NOREPINEPHRINE BITARTRATE 0.02 MG/ML
.01-.6 INJECTION, SOLUTION INTRAVENOUS CONTINUOUS
Status: DISCONTINUED | OUTPATIENT
Start: 2023-10-25 | End: 2023-10-27

## 2023-10-25 RX ORDER — SODIUM CHLORIDE, SODIUM LACTATE, POTASSIUM CHLORIDE, CALCIUM CHLORIDE 600; 310; 30; 20 MG/100ML; MG/100ML; MG/100ML; MG/100ML
INJECTION, SOLUTION INTRAVENOUS CONTINUOUS
Status: DISCONTINUED | OUTPATIENT
Start: 2023-10-25 | End: 2023-10-25

## 2023-10-25 RX ORDER — ONDANSETRON 2 MG/ML
4 INJECTION INTRAMUSCULAR; INTRAVENOUS EVERY 30 MIN PRN
Status: DISCONTINUED | OUTPATIENT
Start: 2023-10-25 | End: 2023-10-25

## 2023-10-25 RX ORDER — LEVOTHYROXINE SODIUM 100 UG/1
100 TABLET ORAL
Status: DISCONTINUED | OUTPATIENT
Start: 2023-10-27 | End: 2023-12-01 | Stop reason: HOSPADM

## 2023-10-25 RX ORDER — CHLORHEXIDINE GLUCONATE ORAL RINSE 1.2 MG/ML
15 SOLUTION DENTAL EVERY 12 HOURS
Status: DISCONTINUED | OUTPATIENT
Start: 2023-10-25 | End: 2023-11-03

## 2023-10-25 RX ORDER — HYDROXYZINE HYDROCHLORIDE 10 MG/1
10 TABLET, FILM COATED ORAL EVERY 6 HOURS PRN
Status: DISCONTINUED | OUTPATIENT
Start: 2023-10-25 | End: 2023-12-01 | Stop reason: HOSPADM

## 2023-10-25 RX ORDER — NOREPINEPHRINE BITARTRATE 0.06 MG/ML
.01-.6 INJECTION, SOLUTION INTRAVENOUS CONTINUOUS
Status: DISCONTINUED | OUTPATIENT
Start: 2023-10-25 | End: 2023-10-25 | Stop reason: DRUGHIGH

## 2023-10-25 RX ORDER — SODIUM CHLORIDE 9 MG/ML
INJECTION, SOLUTION INTRAVENOUS CONTINUOUS
Status: DISCONTINUED | OUTPATIENT
Start: 2023-10-25 | End: 2023-11-06

## 2023-10-25 RX ORDER — SODIUM CHLORIDE 9 MG/ML
INJECTION, SOLUTION INTRAVENOUS CONTINUOUS PRN
Status: DISCONTINUED | OUTPATIENT
Start: 2023-10-25 | End: 2023-10-25

## 2023-10-25 RX ORDER — KETAMINE HYDROCHLORIDE 10 MG/ML
INJECTION INTRAMUSCULAR; INTRAVENOUS PRN
Status: DISCONTINUED | OUTPATIENT
Start: 2023-10-25 | End: 2023-10-25

## 2023-10-25 RX ORDER — DEXTROSE MONOHYDRATE 25 G/50ML
25-50 INJECTION, SOLUTION INTRAVENOUS
Status: DISCONTINUED | OUTPATIENT
Start: 2023-10-25 | End: 2023-10-25

## 2023-10-25 RX ORDER — POLYETHYLENE GLYCOL 3350 17 G/17G
17 POWDER, FOR SOLUTION ORAL DAILY
Status: DISCONTINUED | OUTPATIENT
Start: 2023-10-26 | End: 2023-12-01 | Stop reason: HOSPADM

## 2023-10-25 RX ORDER — NICOTINE POLACRILEX 4 MG
15-30 LOZENGE BUCCAL
Status: DISCONTINUED | OUTPATIENT
Start: 2023-10-25 | End: 2023-10-25

## 2023-10-25 RX ORDER — HYDROMORPHONE HCL IN WATER/PF 6 MG/30 ML
0.2 PATIENT CONTROLLED ANALGESIA SYRINGE INTRAVENOUS
Status: DISCONTINUED | OUTPATIENT
Start: 2023-10-25 | End: 2023-10-26

## 2023-10-25 RX ORDER — ONDANSETRON 4 MG/1
4 TABLET, ORALLY DISINTEGRATING ORAL EVERY 30 MIN PRN
Status: DISCONTINUED | OUTPATIENT
Start: 2023-10-25 | End: 2023-10-25

## 2023-10-25 RX ORDER — HYDROMORPHONE HCL IN WATER/PF 6 MG/30 ML
0.2 PATIENT CONTROLLED ANALGESIA SYRINGE INTRAVENOUS EVERY 5 MIN PRN
Status: DISCONTINUED | OUTPATIENT
Start: 2023-10-25 | End: 2023-10-25

## 2023-10-25 RX ORDER — ACETAMINOPHEN 325 MG/1
650 TABLET ORAL EVERY 4 HOURS PRN
Status: DISCONTINUED | OUTPATIENT
Start: 2023-10-28 | End: 2023-12-01 | Stop reason: HOSPADM

## 2023-10-25 RX ORDER — AMOXICILLIN 250 MG
1 CAPSULE ORAL 2 TIMES DAILY
Status: DISCONTINUED | OUTPATIENT
Start: 2023-10-25 | End: 2023-12-01 | Stop reason: HOSPADM

## 2023-10-25 RX ADMIN — EPINEPHRINE 20 MCG: 1 INJECTION INTRAMUSCULAR; INTRAVENOUS; SUBCUTANEOUS at 14:06

## 2023-10-25 RX ADMIN — SENNOSIDES AND DOCUSATE SODIUM 1 TABLET: 8.6; 5 TABLET ORAL at 21:30

## 2023-10-25 RX ADMIN — ALBUMIN HUMAN: 0.05 INJECTION, SOLUTION INTRAVENOUS at 11:13

## 2023-10-25 RX ADMIN — FENTANYL CITRATE 50 MCG: 50 INJECTION INTRAMUSCULAR; INTRAVENOUS at 08:29

## 2023-10-25 RX ADMIN — PHENYLEPHRINE HYDROCHLORIDE 100 MCG: 10 INJECTION INTRAVENOUS at 08:05

## 2023-10-25 RX ADMIN — DEXMEDETOMIDINE HYDROCHLORIDE 10 MCG: 200 INJECTION INTRAVENOUS at 09:09

## 2023-10-25 RX ADMIN — DIPHENHYDRAMINE HYDROCHLORIDE 50 MG: 50 INJECTION, SOLUTION INTRAMUSCULAR; INTRAVENOUS at 14:25

## 2023-10-25 RX ADMIN — EPINEPHRINE 20 MCG: 1 INJECTION INTRAMUSCULAR; INTRAVENOUS; SUBCUTANEOUS at 14:09

## 2023-10-25 RX ADMIN — ALBUMIN HUMAN: 0.05 INJECTION, SOLUTION INTRAVENOUS at 10:19

## 2023-10-25 RX ADMIN — SODIUM BICARBONATE 25 MEQ: 84 INJECTION, SOLUTION INTRAVENOUS at 15:01

## 2023-10-25 RX ADMIN — PHENYLEPHRINE HYDROCHLORIDE 100 MCG: 10 INJECTION INTRAVENOUS at 08:07

## 2023-10-25 RX ADMIN — DEXMEDETOMIDINE HYDROCHLORIDE 0.5 MCG/KG/HR: 100 INJECTION, SOLUTION INTRAVENOUS at 15:30

## 2023-10-25 RX ADMIN — Medication 0.5 UNITS: at 13:28

## 2023-10-25 RX ADMIN — SODIUM CHLORIDE: 9 INJECTION, SOLUTION INTRAVENOUS at 08:30

## 2023-10-25 RX ADMIN — FAMOTIDINE 20 MG: 10 INJECTION, SOLUTION INTRAVENOUS at 14:27

## 2023-10-25 RX ADMIN — INSULIN HUMAN 5 UNITS/HR: 1 INJECTION, SOLUTION INTRAVENOUS at 17:26

## 2023-10-25 RX ADMIN — Medication 5 MG: at 12:50

## 2023-10-25 RX ADMIN — EPINEPHRINE 200 MCG: 1 INJECTION INTRAMUSCULAR; INTRAVENOUS; SUBCUTANEOUS at 15:14

## 2023-10-25 RX ADMIN — CALCIUM CHLORIDE INJECTION 500 MG: 100 INJECTION, SOLUTION INTRAVENOUS at 14:06

## 2023-10-25 RX ADMIN — EPINEPHRINE 100 MCG: 1 INJECTION INTRAMUSCULAR; INTRAVENOUS; SUBCUTANEOUS at 15:00

## 2023-10-25 RX ADMIN — PROPOFOL 150 MCG/KG/MIN: 10 INJECTION, EMULSION INTRAVENOUS at 08:28

## 2023-10-25 RX ADMIN — SUGAMMADEX 200 MG: 100 INJECTION, SOLUTION INTRAVENOUS at 11:55

## 2023-10-25 RX ADMIN — PHENYLEPHRINE HYDROCHLORIDE 100 MCG: 10 INJECTION INTRAVENOUS at 12:50

## 2023-10-25 RX ADMIN — GLYCOPYRROLATE 0.2 MG: 0.2 INJECTION, SOLUTION INTRAMUSCULAR; INTRAVENOUS at 14:28

## 2023-10-25 RX ADMIN — GLYCOPYRROLATE 0.2 MG: 0.2 INJECTION, SOLUTION INTRAMUSCULAR; INTRAVENOUS at 09:20

## 2023-10-25 RX ADMIN — CALCIUM CHLORIDE INJECTION 500 MG: 100 INJECTION, SOLUTION INTRAVENOUS at 14:12

## 2023-10-25 RX ADMIN — DEXAMETHASONE SODIUM PHOSPHATE 10 MG: 4 INJECTION, SOLUTION INTRA-ARTICULAR; INTRALESIONAL; INTRAMUSCULAR; INTRAVENOUS; SOFT TISSUE at 12:09

## 2023-10-25 RX ADMIN — SUCCINYLCHOLINE CHLORIDE 120 MG: 20 INJECTION, SOLUTION INTRAMUSCULAR; INTRAVENOUS; PARENTERAL at 07:47

## 2023-10-25 RX ADMIN — EPINEPHRINE 30 MCG: 1 INJECTION INTRAMUSCULAR; INTRAVENOUS; SUBCUTANEOUS at 14:13

## 2023-10-25 RX ADMIN — CEFAZOLIN SODIUM 2 G: 2 INJECTION, SOLUTION INTRAVENOUS at 20:50

## 2023-10-25 RX ADMIN — NOREPINEPHRINE BITARTRATE 0.04 MCG/KG/MIN: 0.02 INJECTION, SOLUTION INTRAVENOUS at 16:54

## 2023-10-25 RX ADMIN — ROCURONIUM BROMIDE 10 MG: 50 INJECTION, SOLUTION INTRAVENOUS at 09:48

## 2023-10-25 RX ADMIN — EPINEPHRINE 20 MCG: 1 INJECTION INTRAMUSCULAR; INTRAVENOUS; SUBCUTANEOUS at 15:32

## 2023-10-25 RX ADMIN — Medication 1 UNITS: at 13:55

## 2023-10-25 RX ADMIN — PHENYLEPHRINE HYDROCHLORIDE 100 MCG: 10 INJECTION INTRAVENOUS at 08:55

## 2023-10-25 RX ADMIN — PHYTONADIONE 10 MG: 10 INJECTION, EMULSION INTRAMUSCULAR; INTRAVENOUS; SUBCUTANEOUS at 19:55

## 2023-10-25 RX ADMIN — Medication 2 G: at 12:00

## 2023-10-25 RX ADMIN — Medication 5 MG: at 11:12

## 2023-10-25 RX ADMIN — Medication 10 MG: at 13:55

## 2023-10-25 RX ADMIN — GLYCOPYRROLATE 0.2 MG: 0.2 INJECTION, SOLUTION INTRAMUSCULAR; INTRAVENOUS at 09:08

## 2023-10-25 RX ADMIN — ALBUMIN HUMAN: 0.05 INJECTION, SOLUTION INTRAVENOUS at 12:50

## 2023-10-25 RX ADMIN — Medication 10 MG: at 12:25

## 2023-10-25 RX ADMIN — ACETAMINOPHEN 975 MG: 325 TABLET, FILM COATED ORAL at 18:33

## 2023-10-25 RX ADMIN — EPINEPHRINE 100 MCG: 1 INJECTION INTRAMUSCULAR; INTRAVENOUS; SUBCUTANEOUS at 14:42

## 2023-10-25 RX ADMIN — PHENYLEPHRINE HYDROCHLORIDE 100 MCG: 10 INJECTION INTRAVENOUS at 12:53

## 2023-10-25 RX ADMIN — NOREPINEPHRINE BITARTRATE 4 MG/250 ML (16 MCG/ML) IN 0.9 % NACL IV 0.03 MCG/KG/MIN: at 15:06

## 2023-10-25 RX ADMIN — LIDOCAINE HYDROCHLORIDE 60 MG: 20 INJECTION, SOLUTION INFILTRATION; PERINEURAL at 07:47

## 2023-10-25 RX ADMIN — Medication 5 MG: at 09:58

## 2023-10-25 RX ADMIN — EPINEPHRINE 20 MCG: 1 INJECTION INTRAMUSCULAR; INTRAVENOUS; SUBCUTANEOUS at 14:23

## 2023-10-25 RX ADMIN — SODIUM CHLORIDE, POTASSIUM CHLORIDE, SODIUM LACTATE AND CALCIUM CHLORIDE: 600; 310; 30; 20 INJECTION, SOLUTION INTRAVENOUS at 14:06

## 2023-10-25 RX ADMIN — Medication 0.5 UNITS: at 13:15

## 2023-10-25 RX ADMIN — EPINEPHRINE 10 MCG: 1 INJECTION INTRAMUSCULAR; INTRAVENOUS; SUBCUTANEOUS at 14:03

## 2023-10-25 RX ADMIN — Medication 5 MG: at 11:02

## 2023-10-25 RX ADMIN — SODIUM BICARBONATE 25 MEQ: 84 INJECTION, SOLUTION INTRAVENOUS at 15:14

## 2023-10-25 RX ADMIN — Medication 1 UNITS: at 13:46

## 2023-10-25 RX ADMIN — PROPOFOL 200 MG: 10 INJECTION, EMULSION INTRAVENOUS at 07:47

## 2023-10-25 RX ADMIN — Medication 1 UNITS: at 14:01

## 2023-10-25 RX ADMIN — CHLORHEXIDINE GLUCONATE 15 ML: 1.2 RINSE ORAL at 20:50

## 2023-10-25 RX ADMIN — PHENYLEPHRINE HYDROCHLORIDE 200 MCG: 10 INJECTION INTRAVENOUS at 12:59

## 2023-10-25 RX ADMIN — SODIUM BICARBONATE 50 MEQ: 84 INJECTION, SOLUTION INTRAVENOUS at 16:03

## 2023-10-25 RX ADMIN — SODIUM BICARBONATE 50 MEQ: 84 INJECTION, SOLUTION INTRAVENOUS at 14:43

## 2023-10-25 RX ADMIN — Medication 5 MG: at 12:04

## 2023-10-25 RX ADMIN — Medication 5 MG: at 14:01

## 2023-10-25 RX ADMIN — Medication 5 MG: at 09:22

## 2023-10-25 RX ADMIN — Medication 5 MG: at 09:23

## 2023-10-25 RX ADMIN — ATROPINE SULFATE 1 MG: 1 INJECTION, SOLUTION INTRAMUSCULAR; INTRAVENOUS; SUBCUTANEOUS at 14:57

## 2023-10-25 RX ADMIN — Medication 0.5 UNITS: at 13:23

## 2023-10-25 RX ADMIN — GLYCOPYRROLATE 0.2 MG: 0.2 INJECTION, SOLUTION INTRAMUSCULAR; INTRAVENOUS at 14:30

## 2023-10-25 RX ADMIN — TRANEXAMIC ACID 1 G: 1 INJECTION, SOLUTION INTRAVENOUS at 09:53

## 2023-10-25 RX ADMIN — EPINEPHRINE 20 MCG: 1 INJECTION INTRAMUSCULAR; INTRAVENOUS; SUBCUTANEOUS at 14:16

## 2023-10-25 RX ADMIN — EPINEPHRINE 30 MCG: 1 INJECTION INTRAMUSCULAR; INTRAVENOUS; SUBCUTANEOUS at 14:31

## 2023-10-25 RX ADMIN — DEXAMETHASONE SODIUM PHOSPHATE 8 MG: 4 INJECTION, SOLUTION INTRA-ARTICULAR; INTRALESIONAL; INTRAMUSCULAR; INTRAVENOUS; SOFT TISSUE at 08:00

## 2023-10-25 RX ADMIN — NOREPINEPHRINE BITARTRATE 0.16 MCG/KG/MIN: 0.02 INJECTION, SOLUTION INTRAVENOUS at 21:18

## 2023-10-25 RX ADMIN — GLYCOPYRROLATE 0.2 MG: 0.2 INJECTION, SOLUTION INTRAMUSCULAR; INTRAVENOUS at 14:33

## 2023-10-25 RX ADMIN — Medication 5 MG: at 10:54

## 2023-10-25 RX ADMIN — CALCIUM CHLORIDE INJECTION 1 G: 100 INJECTION, SOLUTION INTRAVENOUS at 15:17

## 2023-10-25 RX ADMIN — GABAPENTIN 100 MG: 100 CAPSULE ORAL at 06:04

## 2023-10-25 RX ADMIN — EPINEPHRINE 30 MCG: 1 INJECTION INTRAMUSCULAR; INTRAVENOUS; SUBCUTANEOUS at 15:34

## 2023-10-25 RX ADMIN — ALBUMIN HUMAN: 0.05 INJECTION, SOLUTION INTRAVENOUS at 15:17

## 2023-10-25 RX ADMIN — PHENYLEPHRINE HYDROCHLORIDE 0.2 MCG/KG/MIN: 10 INJECTION INTRAVENOUS at 09:00

## 2023-10-25 RX ADMIN — SODIUM CHLORIDE: 9 INJECTION, SOLUTION INTRAVENOUS at 17:03

## 2023-10-25 RX ADMIN — ROCURONIUM BROMIDE 10 MG: 50 INJECTION, SOLUTION INTRAVENOUS at 10:17

## 2023-10-25 RX ADMIN — SODIUM CHLORIDE: 9 INJECTION, SOLUTION INTRAVENOUS at 12:02

## 2023-10-25 RX ADMIN — Medication 1 UNITS: at 13:37

## 2023-10-25 RX ADMIN — PHENYLEPHRINE HYDROCHLORIDE 200 MCG: 10 INJECTION INTRAVENOUS at 13:51

## 2023-10-25 RX ADMIN — EPINEPHRINE 50 MCG: 1 INJECTION INTRAMUSCULAR; INTRAVENOUS; SUBCUTANEOUS at 14:35

## 2023-10-25 RX ADMIN — FENTANYL CITRATE 50 MCG: 50 INJECTION INTRAMUSCULAR; INTRAVENOUS at 07:47

## 2023-10-25 RX ADMIN — Medication 10 MG: at 12:35

## 2023-10-25 RX ADMIN — REMIFENTANIL HYDROCHLORIDE 0.1 MCG/KG/MIN: 1 INJECTION, POWDER, LYOPHILIZED, FOR SOLUTION INTRAVENOUS at 08:55

## 2023-10-25 RX ADMIN — EPINEPHRINE 100 MCG: 1 INJECTION INTRAMUSCULAR; INTRAVENOUS; SUBCUTANEOUS at 14:57

## 2023-10-25 RX ADMIN — Medication 2 G: at 08:00

## 2023-10-25 RX ADMIN — ROCURONIUM BROMIDE 30 MG: 50 INJECTION, SOLUTION INTRAVENOUS at 09:23

## 2023-10-25 RX ADMIN — Medication 30 MG: at 08:36

## 2023-10-25 RX ADMIN — EPINEPHRINE 200 MCG: 1 INJECTION INTRAMUSCULAR; INTRAVENOUS; SUBCUTANEOUS at 15:05

## 2023-10-25 RX ADMIN — APREPITANT 40 MG: 40 CAPSULE ORAL at 06:45

## 2023-10-25 RX ADMIN — SODIUM CHLORIDE, POTASSIUM CHLORIDE, SODIUM LACTATE AND CALCIUM CHLORIDE: 600; 310; 30; 20 INJECTION, SOLUTION INTRAVENOUS at 06:46

## 2023-10-25 RX ADMIN — LIDOCAINE HYDROCHLORIDE 1 ML: 10 INJECTION, SOLUTION INFILTRATION; PERINEURAL at 06:51

## 2023-10-25 RX ADMIN — Medication 0.5 UNITS: at 13:01

## 2023-10-25 RX ADMIN — Medication 5 MG: at 13:37

## 2023-10-25 RX ADMIN — Medication 10 MG: at 13:52

## 2023-10-25 RX ADMIN — MIDAZOLAM HYDROCHLORIDE 2 MG: 1 INJECTION, SOLUTION INTRAMUSCULAR; INTRAVENOUS at 15:08

## 2023-10-25 RX ADMIN — MIDAZOLAM HYDROCHLORIDE 2 MG: 1 INJECTION, SOLUTION INTRAMUSCULAR; INTRAVENOUS at 14:00

## 2023-10-25 RX ADMIN — PROPOFOL 10 MCG/KG/MIN: 10 INJECTION, EMULSION INTRAVENOUS at 17:11

## 2023-10-25 RX ADMIN — EPINEPHRINE 0.03 MCG/KG/MIN: 1 INJECTION INTRAMUSCULAR; INTRAVENOUS; SUBCUTANEOUS at 14:46

## 2023-10-25 RX ADMIN — EPINEPHRINE 200 MCG: 1 INJECTION INTRAMUSCULAR; INTRAVENOUS; SUBCUTANEOUS at 15:10

## 2023-10-25 ASSESSMENT — ACTIVITIES OF DAILY LIVING (ADL)
ADLS_ACUITY_SCORE: 30
ADLS_ACUITY_SCORE: 28
ADLS_ACUITY_SCORE: 30
ADLS_ACUITY_SCORE: 28
ADLS_ACUITY_SCORE: 30
ADLS_ACUITY_SCORE: 28
ADLS_ACUITY_SCORE: 30

## 2023-10-25 ASSESSMENT — ENCOUNTER SYMPTOMS
SEIZURES: 0
DYSRHYTHMIAS: 0

## 2023-10-25 ASSESSMENT — LIFESTYLE VARIABLES: TOBACCO_USE: 0

## 2023-10-25 NOTE — BRIEF OP NOTE
[facility] Brief Operative Note    Pre-operative diagnosis: Scoliosis of lumbar spine, unspecified scoliosis type [M41.9]   Post-operative diagnosis As above   Procedure: Procedure(s):  Thoracic 11 to pelvis robotic decompression and fusion   Surgeon(s): Surgeon(s) and Role:     * Mumtaz Waite MD - Primary   Estimated blood loss: 3500 mL    Specimens: ID Type Source Tests Collected by Time Destination   A :  Blood, arterial Arm, Left FIBRINOGEN ACTIVITY, HEMOGLOBIN (Canceled), PARTIAL THROMBOPLASTIN TIME, INR, CBC WITH PLATELETS & DIFFERENTIAL Mumtaz Waite MD 10/25/2023  1:56 PM       Findings: See op note

## 2023-10-25 NOTE — ANESTHESIA POSTPROCEDURE EVALUATION
Patient: Mya Hui    Procedure: Procedure(s):  Thoracic 11 to pelvis robotic decompression and fusion       Anesthesia Type:  General    Note:  Disposition: ICU; Disposition Change/Cancellation            ICU Sign Out: Anesthesiologist/ICU physician sign out WAS performed            Disposition Change: Unplanned admission/ICU admission   Postop Pain Control: Uneventful            Sign Out: Well controlled pain   PONV: No   Neuro/Psych: Uneventful            Sign Out: Acceptable/Baseline neuro status   Airway/Respiratory: Uneventful            Sign Out: AIRWAY IN SITU/Resp. Support               Airway in situ/Resp. Support: ETT                 Reason: Unplanned   CV/Hemodynamics: Uneventful            Sign Out: Detailed CV status               Blood Pressure: Pressors               Rate/Rhythm: Normal HR   Other NRE: NONE   DID A NON-ROUTINE EVENT OCCUR? YES    Event details/Postop Comments:  Intra-op course complicated by 3.5L blood loss, with resulting hypotension improved with fluid resuscitation. Transfused 6 units pRBCs and 2 units FFP. Transferred to ICU intubated on norepinephrine.            Last vitals:  Vitals:    10/25/23 0626 10/25/23 1615 10/25/23 1621   BP: (!) 161/83 111/62    Pulse: 61 92 91   Resp: 16 24 24   Temp: 36.5  C (97.7  F)     SpO2: 93% 98% 98%       Electronically Signed By: Warren Orona MD  October 25, 2023  4:32 PM

## 2023-10-25 NOTE — ANESTHESIA PROCEDURE NOTES
Airway       Patient location during procedure: OR       Procedure Start/Stop Times: 10/25/2023 7:50 AM  Staff -        Anesthesiologist:  Warren Orona MD       CRNA: Song Perez APRN CRNA       Performed By: CRNA  Consent for Airway        Urgency: elective  Indications and Patient Condition       Indications for airway management: ramona-procedural       Induction type:RSI       Mask difficulty assessment: 1 - vent by mask    Final Airway Details       Final airway type: endotracheal airway       Successful airway: Single subglottic suction and Oral  Endotracheal Airway Details        ETT size (mm): 7.0       Cuffed: yes       Successful intubation technique: video laryngoscopy       VL Blade Size: Vargas 3       Grade View of Cords: 1       Adjucts: stylet       Position: Right       Measured from: lips       Secured at (cm): 21       Bite block used: Soft (bilateral)    Post intubation assessment        Placement verified by: capnometry, equal breath sounds and chest rise        Number of attempts at approach: 1       Number of other approaches attempted: 0       Secured with: silk tape       Ease of procedure: easy       Dentition: Intact and Unchanged    Medication(s) Administered   Medication Administration Time: 10/25/2023 7:50 AM

## 2023-10-25 NOTE — ANESTHESIA PROCEDURE NOTES
Central Line/PA Catheter Placement    Pre-Procedure   Staff -        Anesthesiologist:  Cleopatra Rucker MD       Performed By: anesthesiologist       Location: OR       Pre-Anesthestic Checklist: patient identified, IV checked, site marked, risks and benefits discussed, informed consent, monitors and equipment checked, pre-op evaluation and at physician/surgeon's request  Timeout:       Correct Patient: Yes        Correct Procedure: Yes        Correct Site: Yes        Correct Position: Yes        Correct Laterality: Yes   Line Placement:   This line was placed Post Induction    Procedure   Procedure: central line       Diagnosis: Hemorrhage       Laterality: right       Insertion Site: internal jugular.       Patient Position: Trendelenburg  Sterile Prep        All elements of maximal sterile barrier technique followed       Patient Prep/Sterile Barriers: draped, hand hygiene, gloves , hat , mask , draped, gown, sterile gel and probe cover       Skin prep: Chloraprep  Insertion/Injection        Technique: ultrasound guided        1. Ultrasound was used to evaluate the access site.       2. Vein evaluated via ultrasound for patency/adequacy.       3. Using real-time ultrasound the needle/catheter was observed entering the artery/vein.       4. Permanent image was captured and entered into the patient's record.       5. The visualized structures were anatomically normal.       6. There were no apparent abnormal pathologic findings.       Type: CVC       Number of Lumens: triple lumen  Narrative         Secured by: suture and anchor securement device       Tegaderm and Biopatch dressing used.       Complications: None apparent,        blood aspirated from all lumens,        All lumens flushed: Yes       Verification method: Ultrasound

## 2023-10-25 NOTE — INTERVAL H&P NOTE
"I have reviewed the surgical (or preoperative) H&P that is linked to this encounter, and examined the patient. There are no significant changes    Clinical Conditions Present on Arrival:  Clinically Significant Risk Factors Present on Admission                  # Obesity: Estimated body mass index is 34.78 kg/m  as calculated from the following:    Height as of this encounter: 1.651 m (5' 5\").    Weight as of this encounter: 94.8 kg (209 lb).       "

## 2023-10-25 NOTE — PROGRESS NOTES
[facility] ICU RESPIRATORY NOTE        Date of Admission: 10/25/2023    Date of Intubation (most recent): 10/25/2023    Reason for Mechanical Ventilation: Airway protection     Number of Days on Mechanical Ventilation: 1    Met Criteria for Spontaneous Breathing Trial: No    Reason for No Spontaneous Breathing Trial: Per MD     Significant Events Today: None     ABG Results:   Recent Labs   Lab 10/25/23  1716 10/25/23  1559 10/25/23  1513 10/25/23  1441   PH 7.18* 7.08* 6.94* 7.00*   PCO2 25* 34* 56* 47*   PO2 176* 288* 134* 89   HCO3 9* 10* 12* 12*   O2PER 50  --   --   --          Current Vent Settings: Vent Mode: CMV/AC  (Continuous Mandatory Ventilation/ Assist Control)  FiO2 (%): 50 %  Resp Rate (Set): 24 breaths/min  Tidal Volume (Set, mL): 700 mL  PEEP (cm H2O): 5 cmH2O  Resp: 20      Skin Assessment: Skin intact     Plan: Continue full vent support and wean as tolerated     RT Royal on 10/25/2023 at 5:47 PM

## 2023-10-25 NOTE — ANESTHESIA CARE TRANSFER NOTE
Patient: Mya Hui    Procedure: Procedure(s):  Thoracic 11 to pelvis robotic decompression and fusion       Diagnosis: Scoliosis of lumbar spine, unspecified scoliosis type [M41.9]  Diagnosis Additional Information: No value filed.    Anesthesia Type:   General     Note:    Oropharynx: endotracheal tube in place and ventilatory support  Level of Consciousness: iatrogenic sedation      Independent Airway: airway patency not satisfactory and stable  Dentition: dentition unchanged    Report to RN Given: handoff report given  Patient transferred to: ICU  Comments:     Patient connected to SpO2, EKG, and arterial blood pressure transport monitors and accompanied by CRNA, anesthesiologist, circulating RN to ICU room. Patient ventilated by anesthesiologist with ambu via ETT with O2 at 10 liters per minute during transport.     On arrival to ICU, endotracheal tube position unchanged, equal, bilateral breath sounds auscultated in ICU room, patient placed on ICU ventilator by respiratory therapist, teeth and oral mucosa intact and unchanged at handoff of care. 6th unit PRBCs infusing upon arrival. Precedex and Norepi drips infusing. At anesthesia handoff of care, clinical monitors and alarms on and functioning, report on patient's clinical status given to ICU RN, report on patient's clinical status given to intensivist, ICU staff questions answered.  ICU Handoff: Call for PAUSE to initiate/utilize ICU HANDOFF, Identified Patient, Identified Responsible Provider, Reviewed the Pertinent Medical History, Discussed Surgical Course, Reviewed Intra-OP Anesthesia Management and Issues during Anesthesia, Set Expectations for Post Procedure Period and Allowed Opportunity for Questions and Acknowledgement of Understanding      Vitals:  Vitals Value Taken Time   /62 10/25/23 1615   Temp     Pulse 96 10/25/23 1652   Resp 16 10/25/23 1652   SpO2 100 % 10/25/23 1652           Electronically Signed By: Camelia Young,  APRN CRNA  October 25, 2023  4:54 PM

## 2023-10-25 NOTE — CONSULTS
Critical Care  Note      10/25/2023    Name: Mya Hui MRN#: [id number]   Age: 83 year old YOB: 1940     Hsptl Day# 0  ICU DAY #    MV DAY #             Problem List:   Principal Problem:    S/P lumbar spinal fusion    S/P lumbar spinal fusion, from T 11 to pelvis with prior radicular symptoms and scoliosis- The surgery itself went well but she had hemorrhagic shock as a complication of procedure. She received 6 units RBC's (including the one finishing on ICU arrival) and 2 FFP in OR. Levophed was started and at 0.04 on ICU arrival.   Hemorrhagic shock - current lactate 12.1. she is being actively resuscitated with blood and other follow up labs on ICU arrival are all pending. We will keep Hb above 8 especially given age and report that ST changes may have been seen while she was resuscitated in OR. She is on Levophed 0.04 and will continue to volume resuscitate as needed. Add: with current coagulopathy will transfuse 2 more FFP; 10 units cryo; and 1 unit platelets; case discussed with Dr. Waite on unit and am grateful for input and support.   Possible MI- with ST changes noted during surgery. Troponin and EKG are pending and will continue to monitor expectantly for other complications.   Acute respiratory failure - she is on 50% FIO2 and +5 PEEP and oxygenation ok. She is severely acidemic and we are hyperventilating her as much as possible. Most recent ABG pending.  HTN  DM - IV Insulin infusion while in shock  Dyslipidemia   Obesity  History of hypothyroid and normal TSH- continue routine synthroid dose   History of CKD - baseline creatinine 1.1  History of lower extremity edema  GERD and history sliding hiatal hernia- PPI  History of cataracts            Summary/Hospital Course:           Assessment and plan :     Mya Hui IS a 83 year old female admitted on 10/25/2023 for hemorrhagic shock after surgery.   I have personally reviewed the daily labs, imaging studies, cultures and  discussed the case with referring physician and consulting physicians.     My assessment and plan by system for this patient is as follows:    Neurology/Psychiatry:   1. Sedated on ventilator     Cardiovascular:   1.Hemodynamics - unstable and severely acidemic; continue to replace blood and fluids as needed; hyperventilate to compensate for acidemia. Doubt MI but will rule out    Pulmonary/Ventilator Management:   1. Acute respiratory failure and severe metabolic acidosis- hyperventilate until shock improves     GI and Nutrition :   1. Deferred today     Renal/Fluids/Electrolytes:   1. Current creatinine 1.13; baseline; will follow     Infectious Disease:   1. No issues at present     Endocrine:   1. IV insulin protocol     Hematology/Oncology:   1. Hb 10.3; follow closely      IV/Access:   1. Venous access -   2. Arterial access -   3.  Plan  - central access required and necessary      ICU Prophylaxis:   1. DVT: Hep Subq/ LMWH/mechanical  2. VAP: HOB 30 degrees, chlorhexidine rinse  3. Stress Ulcer: PPI/H2 blocker  4. Restraints: Nonviolent soft two point restraints required and necessary for patient safety and continued cares and good effect as patient continues to pull at necessary lines, tubes despite education and distraction. Will readdress daily.   5. Wound care  -   6. Feeding - deferred today   7. Family Update: deferred   8. Disposition - ICU care         Key goals for next 24 hours:   1.  2.  3.               Medical History:     Past Medical History:   Diagnosis Date    Acute cholecystitis 3/23/2018    Acute kidney injury (H24) 9/10/2017    Arthritis     Dehydration 9/4/2017    Diabetes type 2, controlled (H)     Elevated lactic acid level 9/10/2017    GERD (gastroesophageal reflux disease)     History of renal calculi     HTN, goal below 140/90     Hyperlipidaemia LDL goal < 100     Hypokalemia 9/3/2017    Hypothyroid     Insomnia     Left carotid stenosis     Migraine     Motion sickness     PONV  (postoperative nausea and vomiting)     Sciatica of right side 6/28/2013    Type 2 diabetes mellitus without complication, without long-term current use of insulin (H) 2/16/2017     Past Surgical History:   Procedure Laterality Date    BUNIONECTOMY      Right foot    CATARACT IOL, RT/LT Bilateral 2017    COLONOSCOPY  7/12/2013    Procedure: COLONOSCOPY;  Colonoscopy;  Surgeon: Giovany Espinoza MD;  Location: [facility]    COLONOSCOPY N/A 1/31/2019    Procedure: Combined Colonoscopy, Single Or Multiple Biopsy/Polypectomy By Biopsy;  Surgeon: Efren Osorio MD;  Location: [facility]    COLONOSCOPY WITH CO2 INSUFFLATION N/A 1/31/2019    Procedure: COLONOSCOPY WITH CO2 INSUFFLATION;  Surgeon: Efren Osorio MD;  Location: [facility]    FRACTURE TX, ANKLE RT/LT      Left ankle    HC CYSTOURETHROSCOPY W/ URETEROSCOPY &/OR PYELOSCOPY; W/ LITHOTRIPSY      HC REVISE MEDIAN N/CARPAL TUNNEL SURG      Right wrist    INJECT EPIDURAL LUMBAR Right 12/27/2018    Procedure: INJECT EPIDURAL LUMBAR right foraminal narrowing severe at L4-L5 and moderate at L5-S1;  Surgeon: Gilbert Mcclure MD;  Location: [facility]    INJECT EPIDURAL TRANSFORAMINAL Bilateral 5/27/2021    Procedure: Bilateral Lumbar 5-sacral 1 Epidural steroid injection;  Surgeon: Gilbert Mcclure MD;  Location: [facility]    INJECT EPIDURAL TRANSFORAMINAL Right 6/17/2022    Procedure: Attempted right Lumbar 4-5 and Lumbar 5-Sacral 1 epidural injections with completion of only the right Lumbar 5-Sacral1 Transforaminal Epidural Steroid Injection with fluoroscopic guidance and contrast;  Surgeon: Gilbert Mcclure MD;  Location: [facility]    INJECT EPIDURAL TRANSFORAMINAL Right 7/13/2023    Procedure: Lumbar 3-4 interlaminar epidural steroid injection using fluoroscopic guidance with contrast dye, Right;  Surgeon: Gilbert Mcclure MD;  [facility]    INJECT JOINT SACROILIAC  4/10/2014    Procedure: INJECT JOINT SACROILIAC;  Sacroiliac Joint Injection;  Surgeon: Ren  Gilbert VELÁSQUEZ MD;  Location[facility]    INJECT JOINT SACROILIAC Left 4/11/2019    Procedure: INJECT JOINT SACROILIAC- LEFT;  Surgeon: Gilbert Mcclure MD;  [facility]    INJECT JOINT SACROILIAC Right 3/3/2023    Procedure: Fluoroscopically-guided injection into the Right Sacroiliac joint;  Surgeon: Gilbert Mcclure MD;  [facility]    LAPAROSCOPIC CHOLECYSTECTOMY N/A 3/24/2018    Procedure: LAPAROSCOPIC CHOLECYSTECTOMY;  Laparoscopic Cholecystectomy;  Surgeon: Berry Allen DO;  Location: [facility]     Social History     Socioeconomic History    Marital status:      Spouse name: Not on file    Number of children: Not on file    Years of education: Not on file    Highest education level: Not on file   Occupational History     Employer: RETIRED     Comment: 's office part time   Tobacco Use    Smoking status: Never    Smokeless tobacco: Never   Vaping Use    Vaping Use: Never used   Substance and Sexual Activity    Alcohol use: Yes    Drug use: No    Sexual activity: Not Currently   Other Topics Concern    Parent/sibling w/ CABG, MI or angioplasty before 65F 55M? Not Asked   Social History Narrative    Not on file     Social Determinants of Health     Financial Resource Strain: Low Risk  (10/19/2023)    Financial Resource Strain     Within the past 12 months, have you or your family members you live with been unable to get utilities (heat, electricity) when it was really needed?: No   Food Insecurity: Low Risk  (10/19/2023)    Food Insecurity     Within the past 12 months, did you worry that your food would run out before you got money to buy more?: No     Within the past 12 months, did the food you bought just not last and you didn t have money to get more?: No   Transportation Needs: Low Risk  (10/19/2023)    Transportation Needs     Within the past 12 months, has lack of transportation kept you from medical appointments, getting your medicines, non-medical meetings or appointments, work, or from  getting things that you need?: No   Physical Activity: Not on file   Stress: Not on file   Social Connections: Not on file   Interpersonal Safety: Low Risk  (10/19/2023)    Interpersonal Safety     Do you feel physically and emotionally safe where you currently live?: Yes     Within the past 12 months, have you been hit, slapped, kicked or otherwise physically hurt by someone?: No     Within the past 12 months, have you been humiliated or emotionally abused in other ways by your partner or ex-partner?: No   Housing Stability: Low Risk  (10/19/2023)    Housing Stability     Do you have housing? : Yes     Are you worried about losing your housing?: No        Allergies   Allergen Reactions    Fentanyl Nausea and Vomiting     VERY sensitive to most narcotics if not all.    Morphine And Related Nausea              Key Medications:      acetaminophen  975 mg Oral or NG Tube Q8H    ceFAZolin  2 g Intravenous Q8H    chlorhexidine  15 mL Mouth/Throat Q12H    [START ON 10/27/2023] levothyroxine  100 mcg Oral or NG Tube QAM AC    [START ON 10/26/2023] pantoprazole  40 mg Per Feeding Tube QAM AC    Or    [START ON 10/26/2023] pantoprazole  40 mg Intravenous QAM AC    [START ON 10/26/2023] polyethylene glycol  17 g Oral or NG Tube Daily    senna-docusate  1 tablet Oral or NG Tube BID    sodium chloride (PF)  3 mL Intracatheter Q8H      propofol      And    - MEDICATION INSTRUCTIONS -      norepinephrine      sodium chloride          Home Meds  No current facility-administered medications on file prior to encounter.  acetaminophen (TYLENOL) 500 MG tablet, Take 500-1,000 mg by mouth every 6 hours as needed for mild pain  cholestyramine (QUESTRAN) 4 g packet, Take 1 packet (4 g) by mouth 2 times daily (with meals)  diltiazem ER COATED BEADS (CARDIZEM CD/CARTIA XT) 120 MG 24 hr capsule, Take 1 capsule (120 mg) by mouth 2 times daily  levothyroxine (SYNTHROID/LEVOTHROID) 100 MCG tablet, Take 1 tablet (100 mcg) by mouth  daily  lisinopril (ZESTRIL) 40 MG tablet, Take 1 tablet (40 mg) by mouth daily  oxybutynin ER (DITROPAN XL) 10 MG 24 hr tablet, Take 1 tablet (10 mg) by mouth daily  pantoprazole (PROTONIX) 40 MG EC tablet, Take 1 tablet (40 mg) by mouth 2 times daily  polyethylene glycol (MIRALAX) 17 g packet, Take 1 packet by mouth every evening as needed  potassium chloride ER (K-TAB/KLOR-CON) 10 MEQ CR tablet, Take 1 tablet (10 mEq) by mouth 3 times daily  pravastatin (PRAVACHOL) 40 MG tablet, Take 1 tablet (40 mg) by mouth daily  blood glucose (ACCU-CHEK GUIDE) test strip, Use to test blood sugar 1 times daily or as directed.  blood glucose monitoring (ACCU-CHEK FASTCLIX) lancets, USE TO TEST BLOOD SUGARS ONCE DAILY  Incontinence Supply Disposable (PROTECTIVE UNDERWEAR SUPER LG) MISC, 3 each daily  order for DME, Equipment being ordered: [employer] shoes and insoles               Physical Examination:   Temp:  [97.7  F (36.5  C)] 97.7  F (36.5  C)  Pulse:  [61-92] 91  Resp:  [16-24] 24  BP: (111-161)/(62-83) 111/62  MAP:  [67 mmHg-82 mmHg] 82 mmHg  Arterial Line BP: ()/(52-59) 126/59  FiO2 (%):  [50 %] 50 %  SpO2:  [93 %-98 %] 98 %    Intake/Output Summary (Last 24 hours) at 10/25/2023 1636  Last data filed at 10/25/2023 1617  Gross per 24 hour   Intake 6877 ml   Output 3725 ml   Net 3152 ml     Wt Readings from Last 4 Encounters:   10/25/23 94.8 kg (209 lb)   10/19/23 95 kg (209 lb 8 oz)   09/28/23 97.1 kg (214 lb)   09/14/23 97.1 kg (214 lb)     Arterial Line BP: ()/(52-59) 126/59  MAP:  [67 mmHg-82 mmHg] 82 mmHg  BP - Mean:  [75] 75  Vent Mode: CMV/AC  (Continuous Mandatory Ventilation/ Assist Control)  FiO2 (%): 50 %  Resp Rate (Set): 24 breaths/min  Tidal Volume (Set, mL): 700 mL  PEEP (cm H2O): 5 cmH2O  Resp: 24    Recent Labs   Lab 10/25/23  1559 10/25/23  1513 10/25/23  1441 10/25/23  1308   PH 7.08* 6.94* 7.00* 7.26*   PCO2 34* 56* 47* 41   PO2 288* 134* 89 231*   HCO3 10* 12* 12* 18*       GEN: no acute  "distress; comfortable on ventilator    HEENT: head ncat, sclera anicteric, OP patent, trachea midline   PULM: unlabored synchronous with vent, clear anteriorly    CV/COR: RRR S1S2 no gallop,  No rub, no murmur  ABD: soft nontender, obese  EXT:  minimal edema   warm  NEURO: sedated on ventilator   SKIN: no obvious rash; remarkably no mottling or cyanosis; skin appears well perfused   LINES: clean, dry intact         Data:   All data and imaging reviewed     ROUTINE ICU LABS (Last four results)  CMP  Recent Labs   Lab 10/25/23  1559 10/25/23  1513 10/25/23  1441 10/25/23  1308 10/25/23  1218 10/25/23  0641 10/19/23  1053    143 140 141   < >  --  143   POTASSIUM 4.7 5.1 4.7 4.1   < > 3.9 4.3   CHLORIDE  --   --   --   --   --   --  107   CO2  --   --   --   --   --   --  24   ANIONGAP  --   --   --   --   --   --  12   GLC  --   --   --   --   --  129* 128*   BUN  --   --   --   --   --   --  18.3   CR  --   --   --   --   --  1.12* 1.11*   GFRESTIMATED  --   --   --   --   --  49* 50*   TERESA  --   --   --   --   --   --  9.3    < > = values in this interval not displayed.     CBC  Recent Labs   Lab 10/25/23  1559 10/25/23  1513 10/25/23  1441 10/25/23  1356 10/25/23  1218 10/19/23  1053   WBC  --   --   --  7.7  --  5.5   RBC  --   --   --  2.44*  --  4.37   HGB 8.5* 8.2* 5.8* 7.7*   < > 13.1   HCT 25* 24* 17* 23.5*   < > 41.1   MCV  --   --   --  96  --  94   MCH  --   --   --  31.6  --  30.0   MCHC  --   --   --  32.8  --  31.9   RDW  --   --   --  13.0  --  12.7   PLT  --   --   --  118*  --  182    < > = values in this interval not displayed.     INR  Recent Labs   Lab 10/25/23  1356   INR 1.62*     Arterial Blood Gas  Recent Labs   Lab 10/25/23  1559 10/25/23  1513 10/25/23  1441 10/25/23  1308   PH 7.08* 6.94* 7.00* 7.26*   PCO2 34* 56* 47* 41   PO2 288* 134* 89 231*   HCO3 10* 12* 12* 18*       All cultures:  No results for input(s): \"CULT\" in the last 168 hours.  Recent Results (from the past 24 hour(s)) "   XR Surgery PARKER Fluoro Less Than 5 Min w Stills    Narrative    SURGERY C-ARM FLUORO LESS THAN 5 MINUTES WITH STILLS  10/25/2023 3:05  PM     COMPARISON: None.    HISTORY: Intra-Op. T11-pelvis fusion.    NUMBER OF IMAGES ACQUIRED/VIEWS: Two spin CT acquisitions of the  thoracolumbar spine in the axial plane.    FLUOROSCOPY TIME: 0.7 minutes.      Impression    IMPRESSION: Correlation is made with CT lumbar spine 10/16/2023. Axial  spin CT acquisitions of the thoracolumbar spine were obtained during  the patient's operation for localization/operative navigation  purposes. No intraoperative consultation a radiologist was requested  for this exam. There is new posterior fusion instrumentation extending  from the lower thoracic region down to the bony pelvis region,  including screws extending to the bilateral sacroiliac joints. Sigmoid  curvature of the thoracolumbar spine, as seen on prior CT. Please see  the operative report from spine surgery for additional details and  real-time findings.    JORGE PATEL MD         SYSTEM ID:  [id number]   XR Abdomen Port 1 View    Narrative    ABDOMEN ONE VIEW PORTABLE  10/25/2023 4:02 PM     HISTORY: Instrument count.    COMPARISON: None.       Impression    IMPRESSION: Spine fusion changes. Overlying skin staples. Enteric tube  tip in left upper quadrant. Otherwise no radiopaque foreign bodies.  Small amount of stool. Nonobstructed bowel gas pattern.    JORGE GARCIA MD         SYSTEM ID:  [id number]         MD Peace    Billing: This patient is critically ill: Yes. Total critical care time today   65 min.

## 2023-10-25 NOTE — ANESTHESIA PROCEDURE NOTES
Arterial Line Procedure Note    Pre-Procedure   Staff -        Anesthesiologist:  Warren Orona MD       Performed By: anesthesiologist       Location: pre-op       Pre-Anesthestic Checklist: patient identified, IV checked, risks and benefits discussed, informed consent, monitors and equipment checked, pre-op evaluation and at physician/surgeon's request  Timeout:       Correct Patient: Yes        Correct Procedure: Yes        Correct Site: Yes        Correct Position: Yes   Line Placement:   This line was placed Pre Induction starting at 10/25/2023 6:51 AM and ending at 10/25/2023 6:54 AM  Procedure   Procedure: arterial line and new line       Laterality: right       Insertion Site: radial.  Sterile Prep        Standard elements of sterile barrier followed       Skin prep: Chloraprep  Insertion/Injection        Technique: Seldinger Technique        1. Ultrasound was used to evaluate the access site.       2. Artery evaluated via ultrasound for patency/adequacy.       3. Using real-time ultrasound the needle/catheter was observed entering the artery/vein.       4. Permanent image was captured and entered into the patient's record.       5. The visualized structures were anatomically normal.       6. There were no apparent abnormal pathologic findings.       Catheter Type/Size: 20 gauge, 1.75 in/4.5 cm quick cath (integral wire)  Narrative         Secured by: transparent dressing       Tegaderm dressing used.       Arterial waveform: Yes        IBP within 10% of NIBP: Yes

## 2023-10-25 NOTE — OP NOTE
DATE OF SURGERY: October 25, 2023      SURGEON:  Mumtaz Waite MD   ASSISTANT:  Rhina Gregory NP  (Note: The assistant was present for and assisted with the entire surgery, and his/her role as an assistant was crucial for aid in positioning, exposure, suctioning, retraction, and closure)      PREOPERATIVE DIAGNOSIS:  Lumbar scoliosis and radiculopathy  POSTOPERATIVE DIAGNOSIS:  Lumbar scoliosis and radiculopathy      PROCEDURES:   1.  T11-Sacrum/Ilium Insertion of Posterior Segmental Instrumentation (Medtronic Module X)  2.  T11-Sacrum/Ilium Posterolateral Arthrodesis and Fusion  3.  Instrumentation for Pelvic Fixation with insertion of Bilateral Sacral 5-Fjxd-Sfdrc screws and bilateral second anchors  4.  L2-L3, L3-L4, and L4-L5 bilateral laminectomies for decompression of stenosis  5.  Left L2-L3 laminotomies, complete facetectomy, and foraminotomy for decompression of foraminal stenosis  6.  Right L3-L4 complete facetectomy, transforaminal discectomy, and interbody arthrodesis  7.  Right L4-L5 complete facetectomy, transforaminal discectomy, and interbody arthrodesis  8.  L3-L4 insertion of intervertebral graft with allograft  9.  L4-L5 insertion of intervertebral graft with allograft  10.  Use of [employer] O-arm intra-operative CT scan  11.  Use of [employer] surgical robot  12.  Use of intraoperative microscopy, neuro-monitoring, and fluoroscopy.       ESTIMATED BLOOD LOSS:  2000 mL.       INDICATIONS FOR SURGERY: 83 year old female presented with progressive severe, 50 degree scoliosis, multi-level disc degeneration, foraminal stenosis, severe and progressive back and leg pain.  She did extensive non-surgical management without improvement.  Developed marked progression of loss of functional status, unable to stand or walk for any meaningful length of time.  Risks, benefits, indications and alternatives were discussed with the patient and her family in detail including infection, hematoma, CSF leak,  nerve root injury, pseudoarthrosis, hardware failure, and medical complications including MI, CVA, DVT, and PE.  All of their questions were answered and they wished to proceed with surgery.       DESCRIPTION OF SURGERY:       The patient was positioned prone.  Sterile prepping and draping procedures were performed.  Antibiotics were administered and timeout was performed.  A #15 blade was used to perform a midline thoracolumbar incision.  The monopolar was used to perform a subperiosteal dissection exposing the spinous processes, lamina, facets and transverse processes from T11-Sacrum.  The [employer] surgical robot was registered.  First, under navigation, bilateral sacral 4-kkca-uynmi screw were inserted and bilateral second sacral 1-iozg-ksydm anchors were inserted.  Under robotic guidance, bilateral pedicle screws were inserted at T11, T12, L1, L2, L3, L4, L5, and S1.  An O-arm spin was performed and the hardware was noted to be in good position.  Subsequently, the high speed drill was used to perform bilateral laminectomy at L2-L3, L3-L4, and L4-L5.  The drill was also used to perform left laminotomy at L2-L3, with complete facetectomy and foraminotomy, as well as right facetectomy at L3-L4, and L4-L5.  The kerrison rongeurs were used to remove the ligamentum flavum, and the nerve roots were decompressed.  The 15 blade was used to perform an annulotomy in the disk space at L4-L5.  A complete discectomy was performed with ava, the pituitary rongeurs, and curets.  Interbody arthrodesis was performed with ava and curets.  An intervertebral graft was inserted into the disk space at L4-L5 with allograft.  The 15 blade was used to perform an annulotomy in the disk space at L3-L4.  A complete discectomy was performed with ava, the pituitary rongeurs, and curets.  Interbody arthrodesis was performed with ava and curets.  An intervertebral graft was inserted into the disk space at L3-L4 with allograft.   Subsequently, connecting rods were inserted bilaterally from [id number].  Reducing equipment was used to correct the scoliosis.  Set screws were inserted, and final tightening was performed.  Antibiotic irrigation was performed.  Subsequently, the posterolateral arthrodesis was performed with the high speed drill at T11-12, T12-L1, L1-2, L2-3, L3-4, L4-5, L5-S1, and along the distal sacrum/pelvis.  BMP, Autograft, and allograft were packed for a posterolateral fusion at T11-12, T12-L1, L1-2, L2-3, L3-4, L4-5, L5-S1, and along the distal sacrum/pelvis.  Late in the case, the patient developed arrhythmia and significant hypotension.  There were decreased neuro-monitoring signals in conjunction with the hypotension as well.  This was corrected with transfusion and supportive care.  2 ELY drains were tunneled into position.  The muscle and fascial layer were closed with 0 Vicryl sutures.  The dermal layer was closed with 2-0 Vicryl sutures.  The skin was closed with staples.

## 2023-10-26 ENCOUNTER — APPOINTMENT (OUTPATIENT)
Dept: CARDIOLOGY | Facility: CLINIC | Age: 83
DRG: 453 | End: 2023-10-26
Attending: INTERNAL MEDICINE
Payer: COMMERCIAL

## 2023-10-26 ENCOUNTER — APPOINTMENT (OUTPATIENT)
Dept: ULTRASOUND IMAGING | Facility: CLINIC | Age: 83
DRG: 453 | End: 2023-10-26
Attending: INTERNAL MEDICINE
Payer: COMMERCIAL

## 2023-10-26 PROBLEM — N17.0 ACUTE KIDNEY FAILURE WITH TUBULAR NECROSIS (H): Status: ACTIVE | Noted: 2023-10-26

## 2023-10-26 LAB
ALBUMIN SERPL BCG-MCNC: 2.9 G/DL (ref 3.5–5.2)
ALBUMIN UR-MCNC: 300 MG/DL
ALLEN'S TEST: ABNORMAL
ALP SERPL-CCNC: 46 U/L (ref 35–104)
ALT SERPL W P-5'-P-CCNC: 234 U/L (ref 0–50)
AMORPH CRY #/AREA URNS HPF: ABNORMAL /HPF
ANION GAP SERPL CALCULATED.3IONS-SCNC: 17 MMOL/L (ref 7–15)
APPEARANCE UR: ABNORMAL
AST SERPL W P-5'-P-CCNC: 468 U/L (ref 0–45)
B-OH-BUTYR SERPL-SCNC: 0.4 MMOL/L
BASE EXCESS BLDA CALC-SCNC: -10.7 MMOL/L (ref -9–1.8)
BASE EXCESS BLDA CALC-SCNC: -10.9 MMOL/L (ref -9–1.8)
BASE EXCESS BLDA CALC-SCNC: -11.7 MMOL/L (ref -9–1.8)
BASE EXCESS BLDA CALC-SCNC: -11.7 MMOL/L (ref -9–1.8)
BASE EXCESS BLDA CALC-SCNC: -8.3 MMOL/L (ref -9–1.8)
BASOPHILS # BLD AUTO: 0 10E3/UL (ref 0–0.2)
BASOPHILS NFR BLD AUTO: 0 %
BILIRUB SERPL-MCNC: 0.4 MG/DL
BILIRUB UR QL STRIP: NEGATIVE
BUN SERPL-MCNC: 22.8 MG/DL (ref 8–23)
CA-I BLD-MCNC: 4.3 MG/DL (ref 4.4–5.2)
CA-I BLD-MCNC: 4.6 MG/DL (ref 4.4–5.2)
CALCIUM SERPL-MCNC: 7.7 MG/DL (ref 8.8–10.2)
CHLORIDE SERPL-SCNC: 114 MMOL/L (ref 98–107)
CK SERPL-CCNC: 523 U/L (ref 26–192)
COLOR UR AUTO: YELLOW
CREAT SERPL-MCNC: 2.17 MG/DL (ref 0.51–0.95)
CREAT UR-MCNC: 52.6 MG/DL
DEPRECATED HCO3 PLAS-SCNC: 14 MMOL/L (ref 22–29)
EGFRCR SERPLBLD CKD-EPI 2021: 22 ML/MIN/1.73M2
EOSINOPHIL # BLD AUTO: 0 10E3/UL (ref 0–0.7)
EOSINOPHIL NFR BLD AUTO: 0 %
ERYTHROCYTE [DISTWIDTH] IN BLOOD BY AUTOMATED COUNT: 14.8 % (ref 10–15)
ERYTHROCYTE [DISTWIDTH] IN BLOOD BY AUTOMATED COUNT: 15.4 % (ref 10–15)
ERYTHROCYTE [DISTWIDTH] IN BLOOD BY AUTOMATED COUNT: 15.8 % (ref 10–15)
ERYTHROCYTE [DISTWIDTH] IN BLOOD BY AUTOMATED COUNT: 15.8 % (ref 10–15)
GLUCOSE BLDC GLUCOMTR-MCNC: 125 MG/DL (ref 70–99)
GLUCOSE BLDC GLUCOMTR-MCNC: 125 MG/DL (ref 70–99)
GLUCOSE BLDC GLUCOMTR-MCNC: 127 MG/DL (ref 70–99)
GLUCOSE BLDC GLUCOMTR-MCNC: 128 MG/DL (ref 70–99)
GLUCOSE BLDC GLUCOMTR-MCNC: 131 MG/DL (ref 70–99)
GLUCOSE BLDC GLUCOMTR-MCNC: 132 MG/DL (ref 70–99)
GLUCOSE BLDC GLUCOMTR-MCNC: 135 MG/DL (ref 70–99)
GLUCOSE BLDC GLUCOMTR-MCNC: 138 MG/DL (ref 70–99)
GLUCOSE BLDC GLUCOMTR-MCNC: 140 MG/DL (ref 70–99)
GLUCOSE BLDC GLUCOMTR-MCNC: 141 MG/DL (ref 70–99)
GLUCOSE BLDC GLUCOMTR-MCNC: 147 MG/DL (ref 70–99)
GLUCOSE BLDC GLUCOMTR-MCNC: 147 MG/DL (ref 70–99)
GLUCOSE BLDC GLUCOMTR-MCNC: 152 MG/DL (ref 70–99)
GLUCOSE SERPL-MCNC: 154 MG/DL (ref 70–99)
GLUCOSE UR STRIP-MCNC: 500 MG/DL
HCO3 BLD-SCNC: 14 MMOL/L (ref 21–28)
HCO3 BLD-SCNC: 15 MMOL/L (ref 21–28)
HCO3 BLD-SCNC: 17 MMOL/L (ref 21–28)
HCT VFR BLD AUTO: 25.9 % (ref 35–47)
HCT VFR BLD AUTO: 27.4 % (ref 35–47)
HCT VFR BLD AUTO: 29.6 % (ref 35–47)
HCT VFR BLD AUTO: 32.3 % (ref 35–47)
HGB BLD-MCNC: 10.3 G/DL (ref 11.7–15.7)
HGB BLD-MCNC: 11 G/DL (ref 11.7–15.7)
HGB BLD-MCNC: 8.2 G/DL (ref 11.7–15.7)
HGB BLD-MCNC: 8.6 G/DL (ref 11.7–15.7)
HGB BLD-MCNC: 9.5 G/DL (ref 11.7–15.7)
HGB UR QL STRIP: ABNORMAL
IMM GRANULOCYTES # BLD: 0.1 10E3/UL
IMM GRANULOCYTES NFR BLD: 1 %
INR PPP: 1.58 (ref 0.85–1.15)
KETONES UR STRIP-MCNC: ABNORMAL MG/DL
LACTATE SERPL-SCNC: 1.9 MMOL/L (ref 0.7–2)
LACTATE SERPL-SCNC: 3.9 MMOL/L (ref 0.7–2)
LACTATE SERPL-SCNC: 4 MMOL/L (ref 0.7–2)
LACTATE SERPL-SCNC: 4.3 MMOL/L (ref 0.7–2)
LACTATE SERPL-SCNC: 5.9 MMOL/L (ref 0.7–2)
LEUKOCYTE ESTERASE UR QL STRIP: NEGATIVE
LVEF ECHO: NORMAL
LYMPHOCYTES # BLD AUTO: 1 10E3/UL (ref 0.8–5.3)
LYMPHOCYTES NFR BLD AUTO: 5 %
MAGNESIUM SERPL-MCNC: 1.2 MG/DL (ref 1.7–2.3)
MAGNESIUM SERPL-MCNC: 2.7 MG/DL (ref 1.7–2.3)
MCH RBC QN AUTO: 29.4 PG (ref 26.5–33)
MCH RBC QN AUTO: 29.9 PG (ref 26.5–33)
MCH RBC QN AUTO: 30.4 PG (ref 26.5–33)
MCH RBC QN AUTO: 30.6 PG (ref 26.5–33)
MCHC RBC AUTO-ENTMCNC: 33.2 G/DL (ref 31.5–36.5)
MCHC RBC AUTO-ENTMCNC: 34.1 G/DL (ref 31.5–36.5)
MCHC RBC AUTO-ENTMCNC: 34.7 G/DL (ref 31.5–36.5)
MCHC RBC AUTO-ENTMCNC: 34.8 G/DL (ref 31.5–36.5)
MCV RBC AUTO: 88 FL (ref 78–100)
MONOCYTES # BLD AUTO: 1.4 10E3/UL (ref 0–1.3)
MONOCYTES NFR BLD AUTO: 8 %
MUCOUS THREADS #/AREA URNS LPF: PRESENT /LPF
NEUTROPHILS # BLD AUTO: 16.3 10E3/UL (ref 1.6–8.3)
NEUTROPHILS NFR BLD AUTO: 86 %
NITRATE UR QL: NEGATIVE
NRBC # BLD AUTO: 0 10E3/UL
NRBC BLD AUTO-RTO: 0 /100
O2/TOTAL GAS SETTING VFR VENT: 30 %
OSMOLALITY UR: 333 MMOL/KG (ref 100–1200)
PCO2 BLD: 31 MM HG (ref 35–45)
PCO2 BLD: 33 MM HG (ref 35–45)
PCO2 BLD: 34 MM HG (ref 35–45)
PCO2 BLD: 35 MM HG (ref 35–45)
PCO2 BLD: 35 MM HG (ref 35–45)
PH BLD: 7.24 [PH] (ref 7.35–7.45)
PH BLD: 7.26 [PH] (ref 7.35–7.45)
PH BLD: 7.27 [PH] (ref 7.35–7.45)
PH BLD: 7.27 [PH] (ref 7.35–7.45)
PH BLD: 7.3 [PH] (ref 7.35–7.45)
PH UR STRIP: 5.5 [PH] (ref 5–7)
PHOSPHATE SERPL-MCNC: 3.8 MG/DL (ref 2.5–4.5)
PLATELET # BLD AUTO: 77 10E3/UL (ref 150–450)
PLATELET # BLD AUTO: 91 10E3/UL (ref 150–450)
PLATELET # BLD AUTO: 92 10E3/UL (ref 150–450)
PLATELET # BLD AUTO: 96 10E3/UL (ref 150–450)
PO2 BLD: 75 MM HG (ref 80–105)
PO2 BLD: 76 MM HG (ref 80–105)
PO2 BLD: 77 MM HG (ref 80–105)
PO2 BLD: 91 MM HG (ref 80–105)
PO2 BLD: 97 MM HG (ref 80–105)
POTASSIUM SERPL-SCNC: 4.5 MMOL/L (ref 3.4–5.3)
PROT SERPL-MCNC: 4.4 G/DL (ref 6.4–8.3)
RBC # BLD AUTO: 2.93 10E6/UL (ref 3.8–5.2)
RBC # BLD AUTO: 3.12 10E6/UL (ref 3.8–5.2)
RBC # BLD AUTO: 3.37 10E6/UL (ref 3.8–5.2)
RBC # BLD AUTO: 3.68 10E6/UL (ref 3.8–5.2)
RBC URINE: 1 /HPF
SODIUM SERPL-SCNC: 145 MMOL/L (ref 135–145)
SODIUM UR-SCNC: <20 MMOL/L
SP GR UR STRIP: 1.02 (ref 1–1.03)
SQUAMOUS EPITHELIAL: <1 /HPF
TROPONIN T SERPL HS-MCNC: 201 NG/L
TROPONIN T SERPL HS-MCNC: 212 NG/L
TROPONIN T SERPL HS-MCNC: 250 NG/L
TROPONIN T SERPL HS-MCNC: 260 NG/L
UROBILINOGEN UR STRIP-MCNC: NORMAL MG/DL
WBC # BLD AUTO: 15.9 10E3/UL (ref 4–11)
WBC # BLD AUTO: 18.1 10E3/UL (ref 4–11)
WBC # BLD AUTO: 18.4 10E3/UL (ref 4–11)
WBC # BLD AUTO: 19.1 10E3/UL (ref 4–11)
WBC # BLD AUTO: 19.1 10E3/UL (ref 4–11)
WBC URINE: 15 /HPF

## 2023-10-26 PROCEDURE — 82803 BLOOD GASES ANY COMBINATION: CPT | Performed by: INTERNAL MEDICINE

## 2023-10-26 PROCEDURE — 250N000013 HC RX MED GY IP 250 OP 250 PS 637: Performed by: INTERNAL MEDICINE

## 2023-10-26 PROCEDURE — C9113 INJ PANTOPRAZOLE SODIUM, VIA: HCPCS | Mod: JZ | Performed by: INTERNAL MEDICINE

## 2023-10-26 PROCEDURE — 99292 CRITICAL CARE ADDL 30 MIN: CPT | Performed by: INTERNAL MEDICINE

## 2023-10-26 PROCEDURE — 99291 CRITICAL CARE FIRST HOUR: CPT | Mod: 24 | Performed by: INTERNAL MEDICINE

## 2023-10-26 PROCEDURE — 250N000009 HC RX 250: Performed by: INTERNAL MEDICINE

## 2023-10-26 PROCEDURE — 81001 URINALYSIS AUTO W/SCOPE: CPT | Performed by: INTERNAL MEDICINE

## 2023-10-26 PROCEDURE — 93306 TTE W/DOPPLER COMPLETE: CPT | Mod: 26 | Performed by: INTERNAL MEDICINE

## 2023-10-26 PROCEDURE — 99207 PR NO BILLABLE SERVICE THIS VISIT: CPT | Performed by: INTERNAL MEDICINE

## 2023-10-26 PROCEDURE — 93010 ELECTROCARDIOGRAM REPORT: CPT | Performed by: INTERNAL MEDICINE

## 2023-10-26 PROCEDURE — 83605 ASSAY OF LACTIC ACID: CPT | Performed by: INTERNAL MEDICINE

## 2023-10-26 PROCEDURE — 76770 US EXAM ABDO BACK WALL COMP: CPT

## 2023-10-26 PROCEDURE — 82570 ASSAY OF URINE CREATININE: CPT | Performed by: INTERNAL MEDICINE

## 2023-10-26 PROCEDURE — 999N000157 HC STATISTIC RCP TIME EA 10 MIN

## 2023-10-26 PROCEDURE — 85025 COMPLETE CBC W/AUTO DIFF WBC: CPT | Performed by: INTERNAL MEDICINE

## 2023-10-26 PROCEDURE — 84100 ASSAY OF PHOSPHORUS: CPT | Performed by: INTERNAL MEDICINE

## 2023-10-26 PROCEDURE — 200N000001 HC R&B ICU

## 2023-10-26 PROCEDURE — 999N000208 ECHOCARDIOGRAM COMPLETE

## 2023-10-26 PROCEDURE — 83935 ASSAY OF URINE OSMOLALITY: CPT | Performed by: INTERNAL MEDICINE

## 2023-10-26 PROCEDURE — 84300 ASSAY OF URINE SODIUM: CPT | Performed by: INTERNAL MEDICINE

## 2023-10-26 PROCEDURE — 85018 HEMOGLOBIN: CPT | Performed by: INTERNAL MEDICINE

## 2023-10-26 PROCEDURE — 82330 ASSAY OF CALCIUM: CPT

## 2023-10-26 PROCEDURE — 250N000011 HC RX IP 250 OP 636: Mod: JZ | Performed by: INTERNAL MEDICINE

## 2023-10-26 PROCEDURE — 83735 ASSAY OF MAGNESIUM: CPT | Performed by: INTERNAL MEDICINE

## 2023-10-26 PROCEDURE — 999N000009 HC STATISTIC AIRWAY CARE

## 2023-10-26 PROCEDURE — 83735 ASSAY OF MAGNESIUM: CPT

## 2023-10-26 PROCEDURE — 250N000011 HC RX IP 250 OP 636: Performed by: INTERNAL MEDICINE

## 2023-10-26 PROCEDURE — 84484 ASSAY OF TROPONIN QUANT: CPT | Performed by: INTERNAL MEDICINE

## 2023-10-26 PROCEDURE — 250N000013 HC RX MED GY IP 250 OP 250 PS 637: Performed by: NURSE PRACTITIONER

## 2023-10-26 PROCEDURE — 80053 COMPREHEN METABOLIC PANEL: CPT | Performed by: INTERNAL MEDICINE

## 2023-10-26 PROCEDURE — 258N000003 HC RX IP 258 OP 636: Performed by: INTERNAL MEDICINE

## 2023-10-26 PROCEDURE — 93005 ELECTROCARDIOGRAM TRACING: CPT

## 2023-10-26 PROCEDURE — 250N000011 HC RX IP 250 OP 636: Mod: JZ | Performed by: NURSE PRACTITIONER

## 2023-10-26 PROCEDURE — 82010 KETONE BODYS QUAN: CPT | Performed by: INTERNAL MEDICINE

## 2023-10-26 PROCEDURE — 99223 1ST HOSP IP/OBS HIGH 75: CPT | Performed by: INTERNAL MEDICINE

## 2023-10-26 PROCEDURE — 82550 ASSAY OF CK (CPK): CPT | Performed by: INTERNAL MEDICINE

## 2023-10-26 PROCEDURE — 94003 VENT MGMT INPAT SUBQ DAY: CPT

## 2023-10-26 PROCEDURE — 85610 PROTHROMBIN TIME: CPT | Performed by: INTERNAL MEDICINE

## 2023-10-26 PROCEDURE — 255N000002 HC RX 255 OP 636: Performed by: NEUROLOGICAL SURGERY

## 2023-10-26 PROCEDURE — 85027 COMPLETE CBC AUTOMATED: CPT | Performed by: INTERNAL MEDICINE

## 2023-10-26 PROCEDURE — 82330 ASSAY OF CALCIUM: CPT | Performed by: INTERNAL MEDICINE

## 2023-10-26 RX ORDER — CEFAZOLIN SODIUM 1 G/3ML
1 INJECTION, POWDER, FOR SOLUTION INTRAMUSCULAR; INTRAVENOUS EVERY 24 HOURS
Status: DISCONTINUED | OUTPATIENT
Start: 2023-10-26 | End: 2023-10-31

## 2023-10-26 RX ORDER — FENTANYL CITRATE 50 UG/ML
12.5 INJECTION, SOLUTION INTRAMUSCULAR; INTRAVENOUS
Status: DISCONTINUED | OUTPATIENT
Start: 2023-10-26 | End: 2023-11-02

## 2023-10-26 RX ORDER — FENTANYL CITRATE 50 UG/ML
25 INJECTION, SOLUTION INTRAMUSCULAR; INTRAVENOUS
Status: DISCONTINUED | OUTPATIENT
Start: 2023-10-26 | End: 2023-11-02

## 2023-10-26 RX ORDER — MAGNESIUM SULFATE HEPTAHYDRATE 40 MG/ML
4 INJECTION, SOLUTION INTRAVENOUS ONCE
Status: COMPLETED | OUTPATIENT
Start: 2023-10-26 | End: 2023-10-26

## 2023-10-26 RX ORDER — CEFAZOLIN SODIUM 1 G/3ML
1 INJECTION, POWDER, FOR SOLUTION INTRAMUSCULAR; INTRAVENOUS EVERY 12 HOURS
Status: DISCONTINUED | OUTPATIENT
Start: 2023-10-26 | End: 2023-10-26

## 2023-10-26 RX ADMIN — PROPOFOL 20 MCG/KG/MIN: 10 INJECTION, EMULSION INTRAVENOUS at 10:22

## 2023-10-26 RX ADMIN — SODIUM CHLORIDE, POTASSIUM CHLORIDE, SODIUM LACTATE AND CALCIUM CHLORIDE 250 ML: 600; 310; 30; 20 INJECTION, SOLUTION INTRAVENOUS at 06:18

## 2023-10-26 RX ADMIN — NOREPINEPHRINE BITARTRATE 0.17 MCG/KG/MIN: 0.02 INJECTION, SOLUTION INTRAVENOUS at 18:13

## 2023-10-26 RX ADMIN — NOREPINEPHRINE BITARTRATE 0.16 MCG/KG/MIN: 0.02 INJECTION, SOLUTION INTRAVENOUS at 14:16

## 2023-10-26 RX ADMIN — METHOCARBAMOL 750 MG: 750 TABLET ORAL at 02:07

## 2023-10-26 RX ADMIN — SODIUM CHLORIDE, POTASSIUM CHLORIDE, SODIUM LACTATE AND CALCIUM CHLORIDE 500 ML: 600; 310; 30; 20 INJECTION, SOLUTION INTRAVENOUS at 05:00

## 2023-10-26 RX ADMIN — CHLORHEXIDINE GLUCONATE 15 ML: 1.2 RINSE ORAL at 20:05

## 2023-10-26 RX ADMIN — SODIUM CHLORIDE 1000 ML: 9 INJECTION, SOLUTION INTRAVENOUS at 08:02

## 2023-10-26 RX ADMIN — MAGNESIUM SULFATE HEPTAHYDRATE 4 G: 40 INJECTION, SOLUTION INTRAVENOUS at 01:04

## 2023-10-26 RX ADMIN — METHOCARBAMOL 750 MG: 750 TABLET ORAL at 10:09

## 2023-10-26 RX ADMIN — NOREPINEPHRINE BITARTRATE 0.16 MCG/KG/MIN: 0.02 INJECTION, SOLUTION INTRAVENOUS at 05:43

## 2023-10-26 RX ADMIN — CEFAZOLIN SODIUM 2 G: 2 INJECTION, SOLUTION INTRAVENOUS at 04:36

## 2023-10-26 RX ADMIN — SODIUM CHLORIDE: 9 INJECTION, SOLUTION INTRAVENOUS at 15:30

## 2023-10-26 RX ADMIN — FENTANYL CITRATE 12.5 MCG: 50 INJECTION, SOLUTION INTRAMUSCULAR; INTRAVENOUS at 09:33

## 2023-10-26 RX ADMIN — HYDROMORPHONE HYDROCHLORIDE 0.2 MG: 0.2 INJECTION, SOLUTION INTRAMUSCULAR; INTRAVENOUS; SUBCUTANEOUS at 02:05

## 2023-10-26 RX ADMIN — HUMAN ALBUMIN MICROSPHERES AND PERFLUTREN 9 ML: 10; .22 INJECTION, SOLUTION INTRAVENOUS at 07:53

## 2023-10-26 RX ADMIN — SENNOSIDES AND DOCUSATE SODIUM 1 TABLET: 8.6; 5 TABLET ORAL at 08:01

## 2023-10-26 RX ADMIN — NOREPINEPHRINE BITARTRATE 0.12 MCG/KG/MIN: 0.02 INJECTION, SOLUTION INTRAVENOUS at 09:50

## 2023-10-26 RX ADMIN — PROPOFOL 25 MCG/KG/MIN: 10 INJECTION, EMULSION INTRAVENOUS at 03:43

## 2023-10-26 RX ADMIN — Medication 0.3 MG/HR: at 11:23

## 2023-10-26 RX ADMIN — NOREPINEPHRINE BITARTRATE 0.14 MCG/KG/MIN: 0.02 INJECTION, SOLUTION INTRAVENOUS at 22:44

## 2023-10-26 RX ADMIN — PANTOPRAZOLE SODIUM 40 MG: 40 INJECTION, POWDER, FOR SOLUTION INTRAVENOUS at 08:01

## 2023-10-26 RX ADMIN — NOREPINEPHRINE BITARTRATE 0.12 MCG/KG/MIN: 0.02 INJECTION, SOLUTION INTRAVENOUS at 01:52

## 2023-10-26 RX ADMIN — FENTANYL CITRATE 12.5 MCG: 50 INJECTION, SOLUTION INTRAMUSCULAR; INTRAVENOUS at 06:58

## 2023-10-26 RX ADMIN — CHLORHEXIDINE GLUCONATE 15 ML: 1.2 RINSE ORAL at 08:01

## 2023-10-26 RX ADMIN — HYDROMORPHONE HYDROCHLORIDE 0.2 MG: 0.2 INJECTION, SOLUTION INTRAMUSCULAR; INTRAVENOUS; SUBCUTANEOUS at 04:24

## 2023-10-26 RX ADMIN — POLYETHYLENE GLYCOL 3350 17 G: 17 POWDER, FOR SOLUTION ORAL at 08:02

## 2023-10-26 RX ADMIN — SENNOSIDES AND DOCUSATE SODIUM 1 TABLET: 8.6; 5 TABLET ORAL at 21:58

## 2023-10-26 RX ADMIN — CEFAZOLIN 1 G: 1 INJECTION, POWDER, FOR SOLUTION INTRAMUSCULAR; INTRAVENOUS at 20:03

## 2023-10-26 RX ADMIN — PROPOFOL 10 MCG/KG/MIN: 10 INJECTION, EMULSION INTRAVENOUS at 20:06

## 2023-10-26 RX ADMIN — SODIUM BICARBONATE 50 MEQ: 84 INJECTION, SOLUTION INTRAVENOUS at 14:29

## 2023-10-26 ASSESSMENT — ACTIVITIES OF DAILY LIVING (ADL)
ADLS_ACUITY_SCORE: 28

## 2023-10-26 NOTE — PROGRESS NOTES
Hospitalist Chart Check  10/26/2023    Noted that post-op Hospitalist consult was placed on 10/25. However, it appears the patient developed hemorrhagic shock and remains intubated and in the ICU. Defer to Intensivist and primary service for care. Will sign-off. Please contact our service for assistance when appropriate.     Suki Mijares MD

## 2023-10-26 NOTE — PROGRESS NOTES
[facility] ICU RESPIRATORY NOTE        Date of Admission: 10/25/2023    Date of Intubation (most recent): 10/25/23    Reason for Mechanical Ventilation: Airway protection.    Number of Days on Mechanical Ventilation: 2    Met Criteria for Spontaneous Breathing Trial: No    Reason for No Spontaneous Breathing Trial: Per MD.    Significant Events Today: None.    ABG Results:   Recent Labs   Lab 10/26/23  0119 10/25/23  2206 10/25/23  2009 10/25/23  1811   PH 7.27* 7.21* 7.23* 7.19*   PCO2 31* 30* 24* 25*   PO2 97 113* 133* 188*   HCO3 14* 12* 10* 9*   O2PER 30 30 30 50         Current Vent Settings: Vent Mode: CMV/AC  (Continuous Mandatory Ventilation/ Assist Control)  FiO2 (%): 30 %  Resp Rate (Set): 20 breaths/min  Tidal Volume (Set, mL): 460 mL  PEEP (cm H2O): 5 cmH2O  Resp: 23      Skin Assessment: Intact.    Plan: Continue full vent support and wean as tolerated.    Fernie Bruce, RT on 10/26/2023 at 4:07 AM

## 2023-10-26 NOTE — PROGRESS NOTES
[facility] ICU RESPIRATORY NOTE        Date of Admission: 10/25/2023    Date of Intubation (most recent): 10/25/23    Reason for Mechanical Ventilation: Airway protection    Number of Days on Mechanical Ventilation: 2    Met Criteria for Spontaneous Breathing Trial: No    Reason for No Spontaneous Breathing Trial: Per MD    Significant Events Today: None    ABG Results:   Recent Labs   Lab 10/26/23  0958 10/26/23  0738 10/26/23  0415 10/26/23  0119   PH 7.27* 7.26* 7.24* 7.27*   PCO2 33* 34* 35 31*   PO2 75* 77* 91 97   HCO3 15* 15* 15* 14*   O2PER 30 30 30 30         Current Vent Settings: Vent Mode: CMV/AC  (Continuous Mandatory Ventilation/ Assist Control)  FiO2 (%): 30 %  Resp Rate (Set): 20 breaths/min  Tidal Volume (Set, mL): 500 mL  PEEP (cm H2O): 5 cmH2O  Resp: 20      Skin Assessment: Intact    Plan: Will cont full vent support for now and will assess for weaning readiness daily.    Rhina Hines RT on 10/26/2023 at 5:55 PM

## 2023-10-26 NOTE — PLAN OF CARE
Neuro: Sedated. Pupils dilated, reactive. BLE withdraw to painful stimuli.  CV: Tele ST ('s). MAP goal > 65.  Pain: Managed w/ continuous dilaudid infusion @ 0.3 mg/hr.  Resp: Mech vent R 20, , fiO2 30%, Peep 5  GI: NPO  : Spicer in place, low urine output, MD aware.  Skin: Back incision, dried drainage. Bilateral ELY drains, bulb to suction, bloody output. Groin excoriated, interdry in place.  Lines/Access: R triple lumen internal jugular. PIV x2. R radial arterial line positional and dampened going off cuff pressure per Dr. Castaneda, still draws blood/ flushes.  Gtts: Levo @ 0.15 mcg/kg/min. Propofol @ 15 mcg/kg/min. Insulin gtt infusing @ 2 units/hr, alg 2, Q2H BG checks.  Social: Daughters updated at bedside.

## 2023-10-26 NOTE — PROGRESS NOTES
ICU cross cover    Pt with spinal surgery with T11 to pelvis robotic decompression and fusion 11/25 with 3.5L EBL.    Post-op, high output from drains from surgical wounds.   Lactic acidosis, shock with vasopressors.  Hb was stable throughout the night, ranging from 10-11.6.  Coags improved on their own. No further blood products.   Lactate decreased to 4.3.  pH imprvoed, vent adjusted to allowed for normalization of CO2.   However, patient with increased Norepi requirement and intermittent hypotension when shifted or provided pain meds as per radial A line (though corresponding cuff pressure stable). Was on continuous IVFs.   Pt was being monitored closely via labs.  Pt with development of LISA. Araceli<20, drop in UOP. UA- protein, ketones (beta OH slightly elevated).   Renal US added.  Stopped continuous IVFs as not obtaining clinical response- pt seems more responsive to intermittent boluses.   Considering EBL, intermittent hypotension, ATN vs pre-renal cause to LISA.  -Monitor UOP in response to boluses.   -void nephrotoxic drugs (adjusted Ancef)  -Avoid further hypotension  -Tylenol held due LFT elevations    Troponin rising. ECG showed sinus tachy but with t wave abnormalities/depression in lateral leads. Bedside cardiac POCUS but poor windows.   -Repeat ECG after HR decreased with IVF bolus  -TTE ordered  -Trend troponin   -DAPT and A/C withheld due to recent spine surgery     CC time: 40 minutes    Eliseo Mitchell MD  [facility],  of Medicine  Pulmonary/Critical Care Medicine  October 26, 2023

## 2023-10-26 NOTE — PROGRESS NOTES
SHIFT SUMMARY [id number]     NEURO: Sedated, withdraws bilaterally to pain. Prn dilaudid given x2  CARDIAC: ST, levo infusing to keep MAP >65  RESP: Full vent support, multiple vent changes made overnight- currentl;y 30%/20/460/5.   GI/: Urine output decreased overnight- 750 LR bolus given with no improvement. Insulin gtt.  DRAINS: ELY x2- decreasing bloody output overnight, approx 15mL/hr per drain.   LAB: Lactic decreasing, troponin increasing- stat EKG and bedside echo done. Hgb stable.     PLAN: Formal complete echo. Renal ultrasound. Possible wean to extubate. CBC, ABG and lactic checks q4hrs.     Inocencia Zee RN

## 2023-10-26 NOTE — PROGRESS NOTES
"[facility]    Neurosurgery Progress Note    Date of Service (when I saw the patient): 10/26/2023     Assessment & Plan     Procedure(s):  Thoracic 11 to pelvis robotic decompression and fusion   -1 Day Post-Op  Today was seen in ICU. Remains intubated and sedated. With sedation weaned, opens eyes to command and moves all extremities equally and reaching for tube. Minimal ability to turn/reposition overnight due to medical status. JPx2 with 210 and 120 out overnight.     Plan:  -Defer medical cares to intensivist   -Wean to extubate as able  -Pain management as needed  -Routine wound care  -Continue drains for now  -Continue antibiotics for now  -Will need orthotics consult for TLSO brace when medically stable    I have discussed the following assessment and plan Dr. Waite who is in agreement with initial plan and will follow up with further consultation recommendations.    Rhina Gregory, PONCHO  [facility] Neurosurgery  [facility]  [address]  [address]  [place] [address]    Tel [phone]  Text page via [facility] Paging/Directory    Interval History   Intubated and sedated    Physical Exam   Temp: 99.6  F (37.6  C) Temp src: Axillary BP: 117/57 Pulse: 114   Resp: 25 SpO2: 93 % O2 Device: Mechanical Ventilator    Vitals:    10/25/23 0626 10/26/23 0400   Weight: 209 lb (94.8 kg) 225 lb 3.2 oz (102.2 kg)     Vital Signs with Ranges  Temp:  [96.5  F (35.8  C)-100.3  F (37.9  C)] 99.6  F (37.6  C)  Pulse:  [] 114  Resp:  [15-37] 25  BP: ()/(51-83) 117/57  MAP:  [41 mmHg-235 mmHg] 76 mmHg  Arterial Line BP: ()/() 119/58  FiO2 (%):  [30 %-50 %] 30 %  SpO2:  [93 %-100 %] 93 %  I/O last 3 completed shifts:  In: 89600.71 [I.V.:6509.71; NG/GT:60; IV Piggyback:750]  Out: 5720 [Urine:885; Drains:1335; Blood:3500]     , Blood pressure 117/57, pulse 114, temperature 99.6  F (37.6  C), temperature source Axillary, resp. rate 25, height 5' 5\" " (1.651 m), weight 225 lb 3.2 oz (102.2 kg), SpO2 93%.  225 lbs 3.2 oz    NEUROLOGICAL EXAMINATION:   Intubated and sedated  Per RN, when sedation is off, able to move all extremities equally and is reaching for tube, opens eyes to command.    JPx2    Medications    dextrose      HYDROmorphone 0.3 mg/hr (10/26/23 1123)    insulin regular 2 Units/hr (10/26/23 1000)    propofol 15 mcg/kg/min (10/26/23 1114)    And    - MEDICATION INSTRUCTIONS -      norepinephrine 0.15 mcg/kg/min (10/26/23 1132)    sodium chloride 20 mL/hr at 10/25/23 2230      [Held by provider] acetaminophen  975 mg Oral or NG Tube Q8H    [START ON 10/27/2023] ceFAZolin  1 g Intravenous Q24H    chlorhexidine  15 mL Mouth/Throat Q12H    [START ON 10/27/2023] levothyroxine  100 mcg Oral or NG Tube QAM AC    pantoprazole  40 mg Per Feeding Tube QAM AC    Or    pantoprazole  40 mg Intravenous QAM AC    polyethylene glycol  17 g Oral or NG Tube Daily    senna-docusate  1 tablet Oral or NG Tube BID    sodium chloride (PF)  3 mL Intracatheter Q8H       Data     CBC RESULTS:   Recent Labs   Lab Test 10/26/23  0958   WBC 15.9*   RBC 2.93*   HGB 8.6*   HCT 25.9*   MCV 88   MCH 29.4   MCHC 33.2   RDW 15.8*   PLT 77*     Basic Metabolic Panel:  Lab Results   Component Value Date     10/26/2023     01/06/2021      Lab Results   Component Value Date    POTASSIUM 4.5 10/26/2023    POTASSIUM 4.7 10/25/2023    POTASSIUM 4.5 05/13/2022    POTASSIUM 4.3 01/06/2021     Lab Results   Component Value Date    CHLORIDE 114 10/26/2023    CHLORIDE 112 05/13/2022    CHLORIDE 112 01/06/2021     Lab Results   Component Value Date    TERESA 7.7 10/26/2023    TERESA 9.2 01/06/2021     Lab Results   Component Value Date    CO2 14 10/26/2023    CO2 26 05/13/2022    CO2 30 01/06/2021     Lab Results   Component Value Date    BUN 22.8 10/26/2023    BUN 17 05/13/2022    BUN 21 01/06/2021     Lab Results   Component Value Date    CR 2.17 10/26/2023    CR 1.04 01/06/2021     Lab  Results   Component Value Date     10/26/2023     05/13/2022    GLC 89 01/06/2021     INR:  Lab Results   Component Value Date    INR 1.58 10/26/2023    INR 1.73 10/25/2023    INR 2.11 10/25/2023    INR 1.62 10/25/2023

## 2023-10-26 NOTE — PROGRESS NOTES
"Critical Care Progress Note      10/26/2023    Name: Mya Hui MRN#: [id number]   Age: 83 year old YOB: 1940     [facility]  ICU DAY #    MV DAY #             Problem List:   Principal Problem:    S/P lumbar spinal fusion  Active Problems:    Acute kidney failure with tubular necrosis (H)  S/P lumbar spinal fusion, from T 11 to pelvis with prior radicular symptoms and scoliosis- The surgery itself went well but she had hemorrhagic shock as a complication of procedure. She received 6 units RBC's (including the one finishing on ICU arrival) and 2 FFP in OR. Levophed was started and at 0.04 on ICU arrival. On POD #1 her Levophed at 0.14 and Hb 8.6- will keep above 8 with troponin \"bump.\"   Hemorrhagic shock - current lactate 3.9 and slowly dropping. she continues with active resuscitation with blood and other follow up labs on ICU arrival are all pending. We will keep Hb above 8 especially given age and report that ST changes may have been seen while she was resuscitated in OR. She is on Levophed 0.14 and will continue to volume resuscitate as needed.   Possible MI- with ST changes noted during surgery. Troponin bump minimal and attribute this to stress.     Acute respiratory failure - on 30%, +5 PEEP; Acidemia is improving only slowly.  Acute renal failure creatinine up to 2.17 and making  only minimal UO. I am grateful to renal's input in advance and following as we support her.   HTN  DM - IV Insulin infusion while in shock  Dyslipidemia   Obesity  History of hypothyroid and normal TSH- continue routine synthroid dose   History of CKD - baseline creatinine 1.1  History of lower extremity edema  GERD and history sliding hiatal hernia- PPI  History of cataracts          Summary/Hospital Course:     82 y/o female s/p lumbar fusion T11 to pelvis presents to the ICU from PACU in hemorrhagic shock after EBL of 3.5 L. Received 6 units pRBCs and 2 FFP. 2 more FFP; 10 units cryo; and 1 unit platelets " in ICU.      Assessment and plan :     Mya Hui IS a 83 year old female admitted on 10/25/2023 for hemorrhagic shock.   I have personally reviewed the daily labs, imaging studies, cultures and discussed the case with referring physician and consulting physicians.     My assessment and plan by system for this patient is as follows:    Neurology/Psychiatry:   Sedated on propofol 15/mcg/kg/min for mechanical ventilation   Dilaudid infusion  for pain    Cardiovascular:   1.Hemodynamics -unstable. Remains tachycardic and hypotensive. She remains on pressors ( Levo).   2. Severe acidemia is improving. Lactate now 4 (from 11). Was hyperventilating patient to compensate.   3. Ischemia - Elevated troponin (260) with non-specific ST-T changes on ECG.   Plan  - Trend troponins today.   - Continue monitoring hemodynamics and adjusting pressors as required.   - Maintenance IVF.  - if hgb < 8.0 will transfuse pRBCs    Pulmonary/Ventilator Management:   1. Airway intubated.   2. Patient with severe metabolic acidosis being hyperventilated.   FiO2 30  RR 20    PEEP 5    Plan  - monitor ABG  - adjust RR as indicated    GI and Nutrition :   1. Will defer 24 hours more     Renal/Fluids/Electrolytes:   1. Baseline Cr 1.1. Current 2.1. Concerning for pre-renal LISA vs ATN in the setting of hemorrhagic shock.   2. Spicer in place. Poor Urine output overnight of 8.75 ml/hr.   3. Acid/base status-  severe acidemia improving. pH is 7.24  4. Volume status  Plan  - consult nephrology to determine appropriate fluid resuscitation in setting of acute renal failure and patient being anuric as would like to avoid volume overload.   - monitor function and electrolytes as needed with replacement per ICU protocols. - generally avoid nephrotoxic agents such as NSAID, IV contrast unless specifically required  - adjust medications as needed for renal clearance  - follow I/O's as appropriate.    Infectious Disease:   Ancef given.  WBC  18.1    Endocrine:   IV insulin protocol for stress induced hyperglycemia  Plan  - ICU insulin protocol, goal sugar <180    Hematology/Oncology:   1. Presented with hemorrhagic shock after spinal fusion procedure. EBL 3.5 L. Hgb of 5.6. Hgb improved to 10.3, now at 9.5.   Plan  - follow hgb; if < 8.0 will transfuse pRBCs     IV/Access:   1. Venous access -   2. Arterial access -   3.  Plan  - central access required and necessary      ICU Prophylaxis:   1. DVT: Hep Subq/ LMWH/mechanical  2. VAP: HOB 30 degrees, chlorhexidine rinse  3. Stress Ulcer: PPI/H2 blocker  4. Restraints: Nonviolent soft two point restraints required and necessary for patient safety and continued cares and good effect as patient continues to pull at necessary lines, tubes despite education and distraction. Will readdress daily.   5. IV Access - central access required and necessary for continued patient cares  6. Feeding - nutrition tomorrow   7. Family Update:daughter called to speak with nursing staff.   8. Disposition - ICU care.         Key goals for next 24 hours:   Monitor hemodynamics   Treat acute renal failure  Trend troponin  Trend blood gas                 Interim History:      Cardiac Ischemia --Troponin stabilized. Elevation likely due to cardiac injury 2/2 hemorrhagic shock.     LISA --Cr acutely elevated at 2.2 which is above baseline of 1.1.     Acidemia-- improving. pH is 7.24           Key Medications:      [Held by provider] acetaminophen  975 mg Oral or NG Tube Q8H    [START ON 10/27/2023] ceFAZolin  1 g Intravenous Q24H    chlorhexidine  15 mL Mouth/Throat Q12H    [START ON 10/27/2023] levothyroxine  100 mcg Oral or NG Tube QAM AC    pantoprazole  40 mg Per Feeding Tube QAM AC    Or    pantoprazole  40 mg Intravenous QAM AC    polyethylene glycol  17 g Oral or NG Tube Daily    senna-docusate  1 tablet Oral or NG Tube BID    sodium chloride (PF)  3 mL Intracatheter Q8H    sodium chloride 0.9%  1,000 mL Intravenous Once       dextrose      insulin regular 2 Units/hr (10/26/23 0753)    propofol 15 mcg/kg/min (10/26/23 0614)    And    - MEDICATION INSTRUCTIONS -      norepinephrine 0.18 mcg/kg/min (10/26/23 0805)    sodium chloride 20 mL/hr at 10/25/23 2230               Physical Examination:   Temp:  [96.5  F (35.8  C)-100.3  F (37.9  C)] 99.6  F (37.6  C)  Pulse:  [] 115  Resp:  [15-37] 21  BP: ()/(51-83) 99/61  MAP:  [41 mmHg-235 mmHg] 67 mmHg  Arterial Line BP: ()/() 84/58  FiO2 (%):  [30 %-50 %] 30 %  SpO2:  [94 %-100 %] 96 %    Intake/Output Summary (Last 24 hours) at 10/26/2023 0817  Last data filed at 10/26/2023 0803  Gross per 24 hour   Intake 97402.71 ml   Output 5720 ml   Net 6370.71 ml     Wt Readings from Last 4 Encounters:   10/26/23 102.2 kg (225 lb 3.2 oz)   10/19/23 95 kg (209 lb 8 oz)   09/28/23 97.1 kg (214 lb)   09/14/23 97.1 kg (214 lb)     Arterial Line BP: ()/() 84/58  MAP:  [41 mmHg-235 mmHg] 67 mmHg  BP - Mean:  [64-95] 72  Vent Mode: CMV/AC  (Continuous Mandatory Ventilation/ Assist Control)  FiO2 (%): 30 %  Resp Rate (Set): 20 breaths/min  Tidal Volume (Set, mL): 460 mL  PEEP (cm H2O): 5 cmH2O  Resp: 21    Recent Labs   Lab 10/26/23  0738 10/26/23  0415 10/26/23  0119 10/25/23  2206   PH 7.26* 7.24* 7.27* 7.21*   PCO2 34* 35 31* 30*   PO2 77* 91 97 113*   HCO3 15* 15* 14* 12*   O2PER 30 30 30 30       GEN: no acute distress; comfortable on vent    HEENT: head ncat, sclera anicteric, OP patent, trachea midline   PULM: unlabored synchronous with vent, clear anteriorly    CV/COR: RRR S1S2 no gallop,  No rub, no murmur  ABD: soft nontender, obese  EXT:  mild edema   warm  NEURO: sedated but does move all extremities to command (weak)   SKIN: no obvious rash; no cyanosis or mottling   LINES: clean, dry intact         Data:   All data and imaging reviewed     ROUTINE ICU LABS (Last four results)  CMP  Recent Labs   Lab 10/26/23  0747 10/26/23  0627 10/26/23  0417 10/26/23  0414  10/26/23  0008 10/26/23  0000 10/25/23  2116 10/25/23  2008 10/25/23  1816 10/25/23  1629 10/25/23  1559 10/25/23  1218 10/25/23  0641 10/19/23  1053   NA  --   --   --  145  --   --   --  146*  --  145 142   < >  --  143   POTASSIUM  --   --   --  4.5  --   --   --  4.1  --  4.9 4.7   < > 3.9 4.3   CHLORIDE  --   --   --  114*  --   --   --  115*  --  113*  --   --   --  107   CO2  --   --   --  14*  --   --   --  8*  --  10*  --   --   --  24   ANIONGAP  --   --   --  17*  --   --   --  23*  --  22*  --   --   --  12   * 138* 147* 154*   < >  --    < > 248*   < > 366*  --   --  129* 128*   BUN  --   --   --  22.8  --   --   --  17.5  --  15.4  --   --   --  18.3   CR  --   --   --  2.17*  --   --   --  1.40*  --  1.13*  --   --  1.12* 1.11*   GFRESTIMATED  --   --   --  22*  --   --   --  38*  --  49*  --   --  49* 50*   TERESA  --   --   --  7.7*  --   --   --  8.1*  --  8.1*  --   --   --  9.3   MAG  --   --   --  2.7*  --  1.2*  --   --   --  1.5*  --   --   --   --    PHOS  --   --   --  3.8  --   --   --   --   --  8.1*  --   --   --   --    PROTTOTAL  --   --   --  4.4*  --   --   --  4.6*  --  3.6*  --   --   --   --    ALBUMIN  --   --   --  2.9*  --   --   --  3.2*  --  2.5*  --   --   --   --    BILITOTAL  --   --   --  0.4  --   --   --  0.6  --  0.3  --   --   --   --    ALKPHOS  --   --   --  46  --   --   --  53  --  42  --   --   --   --    AST  --   --   --  468*  --   --   --  430*  --  233*  --   --   --   --    ALT  --   --   --  234*  --   --   --  306*  --  176*  --   --   --   --     < > = values in this interval not displayed.     CBC  Recent Labs   Lab 10/26/23  0738 10/26/23  0414 10/26/23  0000 10/25/23  2008   WBC 18.1* 19.1*  19.1* 18.4* 16.6*   RBC 3.12* 3.37* 3.68* 3.88   HGB 9.5* 10.3* 11.0* 11.6*   HCT 27.4* 29.6* 32.3* 34.6*   MCV 88 88 88 89   MCH 30.4 30.6 29.9 29.9   MCHC 34.7 34.8 34.1 33.5   RDW 15.8* 15.4* 14.8 14.5   PLT 92* 96* 91* 87*     INR  Recent Labs   Lab  "10/26/23  0414 10/25/23  2114 10/25/23  1629 10/25/23  1356   INR 1.58* 1.73* 2.11* 1.62*     Arterial Blood Gas  Recent Labs   Lab 10/26/23  0738 10/26/23  0415 10/26/23  0119 10/25/23  2206   PH 7.26* 7.24* 7.27* 7.21*   PCO2 34* 35 31* 30*   PO2 77* 91 97 113*   HCO3 15* 15* 14* 12*   O2PER 30 30 30 30       All cultures:  No results for input(s): \"CULT\" in the last 168 hours.  Recent Results (from the past 24 hour(s))   XR Surgery PARKER Fluoro Less Than 5 Min w Stills    Narrative    SURGERY C-ARM FLUORO LESS THAN 5 MINUTES WITH STILLS  10/25/2023 3:05  PM     COMPARISON: None.    HISTORY: Intra-Op. T11-pelvis fusion.    NUMBER OF IMAGES ACQUIRED/VIEWS: Two spin CT acquisitions of the  thoracolumbar spine in the axial plane.    FLUOROSCOPY TIME: 0.7 minutes.      Impression    IMPRESSION: Correlation is made with CT lumbar spine 10/16/2023. Axial  spin CT acquisitions of the thoracolumbar spine were obtained during  the patient's operation for localization/operative navigation  purposes. No intraoperative consultation a radiologist was requested  for this exam. There is new posterior fusion instrumentation extending  from the lower thoracic region down to the bony pelvis region,  including screws extending to the bilateral sacroiliac joints. Sigmoid  curvature of the thoracolumbar spine, as seen on prior CT. Please see  the operative report from spine surgery for additional details and  real-time findings.    JORGE PATEL MD         SYSTEM ID:  [id number]   XR Abdomen Port 1 View    Narrative    ABDOMEN ONE VIEW PORTABLE  10/25/2023 4:02 PM     HISTORY: Instrument count.    COMPARISON: None.       Impression    IMPRESSION: Spine fusion changes. Overlying skin staples. Enteric tube  tip in left upper quadrant. Otherwise no radiopaque foreign bodies.  Small amount of stool. Nonobstructed bowel gas pattern.    JORGE GARCIA MD         SYSTEM ID:  [id number]   XR Chest Port 1 View    Narrative    EXAM: XR CHEST PORT 1 " VIEW  LOCATION: [facility]  DATE: 10/25/2023    INDICATION: Endotracheal tube positioning and OG  COMPARISON: 11/10/2020      Impression    IMPRESSION: Endotracheal tube tip 4.9 cm above the deena. Right IJ central venous catheter with tip in the low SVC. Enteric tube with tip within the intrathoracic stomach. Retrocardiac opacity likely atelectasis. No pneumothorax. Stable   cardiomediastinal silhouette.       Vega Leonard, MS4     MD Peace    Billing: This patient is critically ill: Yes. Total critical care time today 45 min.    I discussed case with Vega Leonard including bedside physical exam and assessment together. The key issues addressed today include acute respiratory failure and mechanical ventilation; hemorrhagic shock including vasopressor management and monitoring of Hb; and acute renal failure and addressing IV fluids and metabolic support.    Peace carbajal  October 26, 2023       None

## 2023-10-26 NOTE — PLAN OF CARE
Neuro: Pupils 5mm, fixed. Unresponsive. No response to painful stimuli.   CV: Tele SR/ ST (110's). MAP goal > 65  Resp: CMV/ AC FiO2 30%, R 24, , PEEP 5  GI: NPO  : Spicer in place, adequate output.  Skin: Back incision, drainage marked. Bilateral ELY drains, bulb to suction, bloody output. Increased output from R ELY, MD aware.  Lines/Access: R internal jugular, PIV x2, R arterial line  Gtts: Levo @ 0.12 mcg/kg/min, Propofol @ 15 mcg/kg/min, NS @75, Insulin gtt @ 3 units/hr, alg 1.   Other: 2 units cyro transfused.

## 2023-10-27 ENCOUNTER — APPOINTMENT (OUTPATIENT)
Dept: ULTRASOUND IMAGING | Facility: CLINIC | Age: 83
DRG: 453 | End: 2023-10-27
Attending: INTERNAL MEDICINE
Payer: COMMERCIAL

## 2023-10-27 ENCOUNTER — APPOINTMENT (OUTPATIENT)
Dept: CT IMAGING | Facility: CLINIC | Age: 83
DRG: 453 | End: 2023-10-27
Attending: INTERNAL MEDICINE
Payer: COMMERCIAL

## 2023-10-27 LAB
ALBUMIN SERPL BCG-MCNC: 2.5 G/DL (ref 3.5–5.2)
ALLEN'S TEST: ABNORMAL
ALP SERPL-CCNC: 47 U/L (ref 35–104)
ALT SERPL W P-5'-P-CCNC: 137 U/L (ref 0–50)
ANION GAP SERPL CALCULATED.3IONS-SCNC: 13 MMOL/L (ref 7–15)
ANION GAP SERPL CALCULATED.3IONS-SCNC: 16 MMOL/L (ref 7–15)
APTT PPP: 31 SECONDS (ref 22–38)
AST SERPL W P-5'-P-CCNC: 858 U/L (ref 0–45)
ATRIAL RATE - MUSE: 117 BPM
ATRIAL RATE - MUSE: 122 BPM
ATRIAL RATE - MUSE: 94 BPM
BASE EXCESS BLDA CALC-SCNC: -8.1 MMOL/L (ref -9–1.8)
BASE EXCESS BLDA CALC-SCNC: -8.3 MMOL/L (ref -9–1.8)
BASE EXCESS BLDA CALC-SCNC: -8.9 MMOL/L (ref -9–1.8)
BASE EXCESS BLDA CALC-SCNC: -9 MMOL/L (ref -9–1.8)
BASE EXCESS BLDA CALC-SCNC: -9.4 MMOL/L (ref -9–1.8)
BILIRUB SERPL-MCNC: 0.2 MG/DL
BLD PROD TYP BPU: NORMAL
BLD PROD TYP BPU: NORMAL
BLOOD COMPONENT TYPE: NORMAL
BLOOD COMPONENT TYPE: NORMAL
BUN SERPL-MCNC: 39.8 MG/DL (ref 8–23)
BUN SERPL-MCNC: 48 MG/DL (ref 8–23)
CA-I BLD-MCNC: 4 MG/DL (ref 4.4–5.2)
CALCIUM SERPL-MCNC: 7 MG/DL (ref 8.8–10.2)
CALCIUM SERPL-MCNC: 7.1 MG/DL (ref 8.8–10.2)
CHLORIDE SERPL-SCNC: 112 MMOL/L (ref 98–107)
CHLORIDE SERPL-SCNC: 113 MMOL/L (ref 98–107)
CODING SYSTEM: NORMAL
CODING SYSTEM: NORMAL
CREAT SERPL-MCNC: 4.41 MG/DL (ref 0.51–0.95)
CREAT SERPL-MCNC: 5.42 MG/DL (ref 0.51–0.95)
CROSSMATCH: NORMAL
CROSSMATCH: NORMAL
DEPRECATED HCO3 PLAS-SCNC: 16 MMOL/L (ref 22–29)
DEPRECATED HCO3 PLAS-SCNC: 16 MMOL/L (ref 22–29)
DIASTOLIC BLOOD PRESSURE - MUSE: NORMAL MMHG
EGFRCR SERPLBLD CKD-EPI 2021: 7 ML/MIN/1.73M2
EGFRCR SERPLBLD CKD-EPI 2021: 9 ML/MIN/1.73M2
ERYTHROCYTE [DISTWIDTH] IN BLOOD BY AUTOMATED COUNT: 16.7 % (ref 10–15)
GLUCOSE BLDC GLUCOMTR-MCNC: 117 MG/DL (ref 70–99)
GLUCOSE BLDC GLUCOMTR-MCNC: 120 MG/DL (ref 70–99)
GLUCOSE BLDC GLUCOMTR-MCNC: 125 MG/DL (ref 70–99)
GLUCOSE BLDC GLUCOMTR-MCNC: 127 MG/DL (ref 70–99)
GLUCOSE BLDC GLUCOMTR-MCNC: 128 MG/DL (ref 70–99)
GLUCOSE BLDC GLUCOMTR-MCNC: 129 MG/DL (ref 70–99)
GLUCOSE BLDC GLUCOMTR-MCNC: 129 MG/DL (ref 70–99)
GLUCOSE BLDC GLUCOMTR-MCNC: 131 MG/DL (ref 70–99)
GLUCOSE BLDC GLUCOMTR-MCNC: 133 MG/DL (ref 70–99)
GLUCOSE BLDC GLUCOMTR-MCNC: 138 MG/DL (ref 70–99)
GLUCOSE BLDC GLUCOMTR-MCNC: 141 MG/DL (ref 70–99)
GLUCOSE SERPL-MCNC: 139 MG/DL (ref 70–99)
GLUCOSE SERPL-MCNC: 148 MG/DL (ref 70–99)
HCO3 BLD-SCNC: 17 MMOL/L (ref 21–28)
HCT VFR BLD AUTO: 21.8 % (ref 35–47)
HGB BLD-MCNC: 7.1 G/DL (ref 11.7–15.7)
HGB BLD-MCNC: 7.2 G/DL (ref 11.7–15.7)
HGB BLD-MCNC: 7.5 G/DL (ref 11.7–15.7)
HGB BLD-MCNC: 7.5 G/DL (ref 11.7–15.7)
INR PPP: 1.57 (ref 0.85–1.15)
INTERPRETATION ECG - MUSE: NORMAL
ISSUE DATE AND TIME: NORMAL
ISSUE DATE AND TIME: NORMAL
LACTATE SERPL-SCNC: 1.5 MMOL/L (ref 0.7–2)
LACTATE SERPL-SCNC: 1.6 MMOL/L (ref 0.7–2)
LACTATE SERPL-SCNC: 1.6 MMOL/L (ref 0.7–2)
MAGNESIUM SERPL-MCNC: 2.2 MG/DL (ref 1.7–2.3)
MAGNESIUM SERPL-MCNC: 2.2 MG/DL (ref 1.7–2.3)
MCH RBC QN AUTO: 29.6 PG (ref 26.5–33)
MCHC RBC AUTO-ENTMCNC: 33 G/DL (ref 31.5–36.5)
MCV RBC AUTO: 90 FL (ref 78–100)
O2/TOTAL GAS SETTING VFR VENT: 45 %
O2/TOTAL GAS SETTING VFR VENT: 50 %
P AXIS - MUSE: 28 DEGREES
P AXIS - MUSE: 32 DEGREES
P AXIS - MUSE: 34 DEGREES
PCO2 BLD: 34 MM HG (ref 35–45)
PCO2 BLD: 35 MM HG (ref 35–45)
PCO2 BLD: 36 MM HG (ref 35–45)
PCO2 BLD: 37 MM HG (ref 35–45)
PCO2 BLD: 37 MM HG (ref 35–45)
PH BLD: 7.27 [PH] (ref 7.35–7.45)
PH BLD: 7.28 [PH] (ref 7.35–7.45)
PH BLD: 7.28 [PH] (ref 7.35–7.45)
PH BLD: 7.31 [PH] (ref 7.35–7.45)
PH BLD: 7.31 [PH] (ref 7.35–7.45)
PHOSPHATE SERPL-MCNC: 4.2 MG/DL (ref 2.5–4.5)
PHOSPHATE SERPL-MCNC: 4.5 MG/DL (ref 2.5–4.5)
PLATELET # BLD AUTO: 74 10E3/UL (ref 150–450)
PO2 BLD: 109 MM HG (ref 80–105)
PO2 BLD: 110 MM HG (ref 80–105)
PO2 BLD: 121 MM HG (ref 80–105)
PO2 BLD: 81 MM HG (ref 80–105)
PO2 BLD: 97 MM HG (ref 80–105)
POTASSIUM SERPL-SCNC: 4.7 MMOL/L (ref 3.4–5.3)
POTASSIUM SERPL-SCNC: 4.8 MMOL/L (ref 3.4–5.3)
PR INTERVAL - MUSE: 130 MS
PR INTERVAL - MUSE: 150 MS
PR INTERVAL - MUSE: 158 MS
PROT SERPL-MCNC: 3.8 G/DL (ref 6.4–8.3)
QRS DURATION - MUSE: 80 MS
QRS DURATION - MUSE: 80 MS
QRS DURATION - MUSE: 88 MS
QT - MUSE: 312 MS
QT - MUSE: 348 MS
QT - MUSE: 398 MS
QTC - MUSE: 435 MS
QTC - MUSE: 495 MS
QTC - MUSE: 497 MS
R AXIS - MUSE: -12 DEGREES
R AXIS - MUSE: -18 DEGREES
R AXIS - MUSE: -9 DEGREES
RBC # BLD AUTO: 2.43 10E6/UL (ref 3.8–5.2)
SODIUM SERPL-SCNC: 142 MMOL/L (ref 135–145)
SODIUM SERPL-SCNC: 144 MMOL/L (ref 135–145)
SYSTOLIC BLOOD PRESSURE - MUSE: NORMAL MMHG
T AXIS - MUSE: 176 DEGREES
T AXIS - MUSE: 229 DEGREES
T AXIS - MUSE: 34 DEGREES
UNIT ABO/RH: NORMAL
UNIT ABO/RH: NORMAL
UNIT NUMBER: NORMAL
UNIT NUMBER: NORMAL
UNIT STATUS: NORMAL
UNIT STATUS: NORMAL
UNIT TYPE ISBT: 5100
UNIT TYPE ISBT: 5100
VENTRICULAR RATE- MUSE: 117 BPM
VENTRICULAR RATE- MUSE: 122 BPM
VENTRICULAR RATE- MUSE: 94 BPM
WBC # BLD AUTO: 12.7 10E3/UL (ref 4–11)

## 2023-10-27 PROCEDURE — 250N000009 HC RX 250: Performed by: NEUROLOGICAL SURGERY

## 2023-10-27 PROCEDURE — 82330 ASSAY OF CALCIUM: CPT | Performed by: PEDIATRICS

## 2023-10-27 PROCEDURE — 250N000011 HC RX IP 250 OP 636: Mod: JZ | Performed by: INTERNAL MEDICINE

## 2023-10-27 PROCEDURE — 99291 CRITICAL CARE FIRST HOUR: CPT | Mod: 25 | Performed by: INTERNAL MEDICINE

## 2023-10-27 PROCEDURE — 250N000011 HC RX IP 250 OP 636: Performed by: INTERNAL MEDICINE

## 2023-10-27 PROCEDURE — 70450 CT HEAD/BRAIN W/O DYE: CPT

## 2023-10-27 PROCEDURE — 250N000011 HC RX IP 250 OP 636: Performed by: PEDIATRICS

## 2023-10-27 PROCEDURE — 250N000013 HC RX MED GY IP 250 OP 250 PS 637: Performed by: INTERNAL MEDICINE

## 2023-10-27 PROCEDURE — 84100 ASSAY OF PHOSPHORUS: CPT | Performed by: PEDIATRICS

## 2023-10-27 PROCEDURE — 85018 HEMOGLOBIN: CPT | Performed by: STUDENT IN AN ORGANIZED HEALTH CARE EDUCATION/TRAINING PROGRAM

## 2023-10-27 PROCEDURE — 83735 ASSAY OF MAGNESIUM: CPT | Performed by: INTERNAL MEDICINE

## 2023-10-27 PROCEDURE — 84100 ASSAY OF PHOSPHORUS: CPT | Performed by: INTERNAL MEDICINE

## 2023-10-27 PROCEDURE — 250N000013 HC RX MED GY IP 250 OP 250 PS 637: Performed by: NURSE PRACTITIONER

## 2023-10-27 PROCEDURE — 82803 BLOOD GASES ANY COMBINATION: CPT | Performed by: PEDIATRICS

## 2023-10-27 PROCEDURE — 200N000001 HC R&B ICU

## 2023-10-27 PROCEDURE — 999N000157 HC STATISTIC RCP TIME EA 10 MIN

## 2023-10-27 PROCEDURE — 85027 COMPLETE CBC AUTOMATED: CPT | Performed by: INTERNAL MEDICINE

## 2023-10-27 PROCEDURE — 85610 PROTHROMBIN TIME: CPT | Performed by: PHYSICIAN ASSISTANT

## 2023-10-27 PROCEDURE — 82803 BLOOD GASES ANY COMBINATION: CPT | Performed by: INTERNAL MEDICINE

## 2023-10-27 PROCEDURE — 83605 ASSAY OF LACTIC ACID: CPT | Performed by: INTERNAL MEDICINE

## 2023-10-27 PROCEDURE — 76705 ECHO EXAM OF ABDOMEN: CPT

## 2023-10-27 PROCEDURE — 250N000009 HC RX 250: Performed by: INTERNAL MEDICINE

## 2023-10-27 PROCEDURE — 94003 VENT MGMT INPAT SUBQ DAY: CPT

## 2023-10-27 PROCEDURE — P9016 RBC LEUKOCYTES REDUCED: HCPCS | Performed by: INTERNAL MEDICINE

## 2023-10-27 PROCEDURE — 85018 HEMOGLOBIN: CPT | Performed by: INTERNAL MEDICINE

## 2023-10-27 PROCEDURE — 85730 THROMBOPLASTIN TIME PARTIAL: CPT | Performed by: PHYSICIAN ASSISTANT

## 2023-10-27 PROCEDURE — 83735 ASSAY OF MAGNESIUM: CPT | Performed by: PEDIATRICS

## 2023-10-27 PROCEDURE — 36620 INSERTION CATHETER ARTERY: CPT | Mod: GC | Performed by: INTERNAL MEDICINE

## 2023-10-27 PROCEDURE — 82040 ASSAY OF SERUM ALBUMIN: CPT | Performed by: INTERNAL MEDICINE

## 2023-10-27 RX ORDER — NOREPINEPHRINE BITARTRATE 0.06 MG/ML
.01-.6 INJECTION, SOLUTION INTRAVENOUS CONTINUOUS
Status: DISCONTINUED | OUTPATIENT
Start: 2023-10-27 | End: 2023-11-02

## 2023-10-27 RX ORDER — CALCIUM GLUCONATE 20 MG/ML
1 INJECTION, SOLUTION INTRAVENOUS ONCE
Status: COMPLETED | OUTPATIENT
Start: 2023-10-27 | End: 2023-10-27

## 2023-10-27 RX ORDER — CHLOROTHIAZIDE SODIUM 500 MG/1
500 INJECTION INTRAVENOUS ONCE
Qty: 20 ML | Refills: 0 | Status: COMPLETED | OUTPATIENT
Start: 2023-10-27 | End: 2023-10-27

## 2023-10-27 RX ORDER — FUROSEMIDE 10 MG/ML
100 INJECTION INTRAMUSCULAR; INTRAVENOUS ONCE
Qty: 12 ML | Refills: 0 | Status: COMPLETED | OUTPATIENT
Start: 2023-10-27 | End: 2023-10-27

## 2023-10-27 RX ADMIN — SENNOSIDES AND DOCUSATE SODIUM 1 TABLET: 8.6; 5 TABLET ORAL at 08:26

## 2023-10-27 RX ADMIN — NOREPINEPHRINE BITARTRATE 0.03 MCG/KG/MIN: 0.06 INJECTION, SOLUTION INTRAVENOUS at 13:21

## 2023-10-27 RX ADMIN — CHLORHEXIDINE GLUCONATE 15 ML: 1.2 RINSE ORAL at 08:29

## 2023-10-27 RX ADMIN — CHLOROTHIAZIDE SODIUM 500 MG: 500 INJECTION, POWDER, LYOPHILIZED, FOR SOLUTION INTRAVENOUS at 13:48

## 2023-10-27 RX ADMIN — INSULIN HUMAN 2 UNITS/HR: 1 INJECTION, SOLUTION INTRAVENOUS at 07:41

## 2023-10-27 RX ADMIN — Medication 40 MG: at 08:26

## 2023-10-27 RX ADMIN — POLYETHYLENE GLYCOL 3350 17 G: 17 POWDER, FOR SOLUTION ORAL at 08:26

## 2023-10-27 RX ADMIN — CALCIUM GLUCONATE 1 G: 20 INJECTION, SOLUTION INTRAVENOUS at 21:30

## 2023-10-27 RX ADMIN — NOREPINEPHRINE BITARTRATE 0.12 MCG/KG/MIN: 0.02 INJECTION, SOLUTION INTRAVENOUS at 09:54

## 2023-10-27 RX ADMIN — NOREPINEPHRINE BITARTRATE 0.14 MCG/KG/MIN: 0.02 INJECTION, SOLUTION INTRAVENOUS at 03:52

## 2023-10-27 RX ADMIN — SODIUM BICARBONATE 50 MEQ: 84 INJECTION, SOLUTION INTRAVENOUS at 14:34

## 2023-10-27 RX ADMIN — CHLORHEXIDINE GLUCONATE 15 ML: 1.2 RINSE ORAL at 19:33

## 2023-10-27 RX ADMIN — FUROSEMIDE 100 MG: 10 INJECTION, SOLUTION INTRAMUSCULAR; INTRAVENOUS at 14:33

## 2023-10-27 RX ADMIN — LEVOTHYROXINE SODIUM 100 MCG: 100 TABLET ORAL at 08:26

## 2023-10-27 RX ADMIN — CEFAZOLIN 1 G: 1 INJECTION, POWDER, FOR SOLUTION INTRAMUSCULAR; INTRAVENOUS at 19:32

## 2023-10-27 ASSESSMENT — ACTIVITIES OF DAILY LIVING (ADL)
ADLS_ACUITY_SCORE: 28
ADLS_ACUITY_SCORE: 28
ADLS_ACUITY_SCORE: 32
ADLS_ACUITY_SCORE: 28
ADLS_ACUITY_SCORE: 32

## 2023-10-27 NOTE — PROGRESS NOTES
"Critical Care Progress Note      10/27/2023    Name: Mya Hui MRN#: [id number]   Age: 83 year old YOB: 1940     [facility]    [facility] DAY #             Problem List:   Principal Problem:    S/P lumbar spinal fusion  Active Problems:    Acute kidney failure with tubular necrosis (H)  Principal Problem:    S/P lumbar spinal fusion  Active Problems:    Acute kidney failure with tubular necrosis (H)  S/P lumbar spinal fusion, from T 11 to pelvis with prior radicular symptoms and scoliosis- The surgery itself went well but she had hemorrhagic shock as a complication of procedure. She received a total of 6 units RBC's,  2 FFP in OR. Levophed was started and at 0.04 on ICU arrival. On POD #1 her Levophed at 0.14 and Hb 8.6- will keep above 8 with troponin \"bump.\"   Hemorrhagic shock - current lactate 1.5. We will keep Hb above 8 especially given age and report that ST changes may have been seen while she was resuscitated in OR, and she received 1 unit RBC's today. She remains on Levophed 0.14 and will continue to volume resuscitate as needed.   Possible MI- with ST changes noted during surgery. Troponin bump minimal and attribute this to stress.     Acute respiratory failure - on 30%, +5 PEEP; Acidemia is improving only slowly.  Acute renal failure creatinine up to 4.41, anuric despite diuretics, and am grateful to renal's input in advance and following as we support her.   CNS- head CT negative; she is only slowly awakening.   HTN  DM - IV Insulin infusion while in shock  Dyslipidemia   Obesity  History of hypothyroid and normal TSH- continue routine synthroid dose   History of CKD - baseline creatinine 1.1  History of lower extremity edema  GERD and history sliding hiatal hernia- PPI  History of cataracts          Summary/Hospital Course:   82 y/o female s/p lumbar fusion T11 to pelvis presents to the ICU from PACU in hemorrhagic shock after EBL of 3.5 L. Received 6 units pRBCs (7 with " today's unit) and 4 FFP; 10 units cryo; and 1 unit platelets in ICU.         Assessment and plan :     Mya Hui IS a 83 year old female admitted on 10/25/2023 for hemorrhagic shock.   I have personally reviewed the daily labs, imaging studies, cultures and discussed the case with referring physician and consulting physicians.     My assessment and plan by system for this patient is as follows:    Neurology/Psychiatry:   Sedated on propofol 10/mcg/kg/min for mechanical ventilation   Dilaudid infusion  for pain  Turned off sedation for a neurologic assessment. She is opening eyes to voice, but not tracking. Unable to follow commands. Reflexes are intact.   Plan  CT w/o contrast of head    Cardiovascular:   1.Hemodynamics -hemodynamically stable on pressors.   2.Rhythm - regular.   3.Ischemia - cardiac troponin was elevated but trending down; likely demand ischemia 2/2 hemorrhagic shock. EKG with nonspecific ST-T changes without ST elevations.   Plan  - Continue monitoring hemodynamics and adjusting pressors as required.   - Maintenance IVF.  - if hgb < 8.0 will transfuse pRBCs    Pulmonary/Ventilator Management:   1. Airway intubated.   2. Patient with severe metabolic acidosis being hyperventilated.   FiO2 30  RR 20    PEEP 5     Plan  - monitor ABG  - adjust RR as indicated    GI and Nutrition :   1. Will test readiness for extubation; if not ready, will start tube feeds tomorrow.     Plan  - Start feeds today    Renal/Fluids/Electrolytes:   1. ATN 2/2 hemorrhagic shock. Cr. 4.41. Nephrology consulted.   2. UOP only about 1-2mL/hour.   Plan  - Nephrology is coming to see the patient today.   - monitor function and electrolytes as needed with replacement per ICU protocols. - generally avoid nephrotoxic agents such as NSAID, IV contrast unless specifically required  - adjust medications as needed for renal clearance  - follow I/O's as appropriate.    Infectious Disease:   Ancef given.  WBC 12  Plan  -  Continue abx.  - No active infectious concerns.     Endocrine:   IV insulin protocol for stress induced hyperglycemia  Plan  - ICU insulin protocol, goal sugar <180        Hematology/Oncology:   1. Presented with hemorrhagic shock after spinal fusion procedure. EBL 3.5 L. Hgb of 5.6. Hgb improved to 10.3, now at 7.2. Was not transfused overnight, would transfuse today.   Plan  - transfuse 1 unit pRBCs  - follow hgb; if < 8.0 will transfuse pRBCs       IV/Access:   1. Venous access - peripherals  2. Arterial access -  right radial; adjusted to today.   Plan  - central access required and necessary      ICU Prophylaxis:   1. DVT: Hep Subq/ LMWH/mechanical  2. VAP: HOB 30 degrees, chlorhexidine rinse  3. Stress Ulcer: PPI/H2 blocker  4. Restraints: Nonviolent soft two point restraints required and necessary for patient safety and continued cares and good effect as patient continues to pull at necessary lines, tubes despite education and distraction. Will readdress daily.   5. IV Access - central access required and necessary for continued patient cares  6. Feeding - nutrition tomorrow   7. Family Update:daughter called to speak with nursing staff.   8. Disposition - ICU care.         Key goals for next 24 hours:   Supportive cares for multi-organ failure 2/2 hemorrhagic shock after spinal fusion procedure.   Wean sedation and FiO2 as tolerated consider extubation if possible  Start feeds tomorrow.               Interim History:   Acute Renal Failure-- Cr elevated to 4.4. Nephrology consulted.     Hepatic injury-- AST elevated > 800.     Acidemia-- improving. pH is 7.28       Key Medications:      [Held by provider] acetaminophen  975 mg Oral or NG Tube Q8H    ceFAZolin  1 g Intravenous Q24H    chlorhexidine  15 mL Mouth/Throat Q12H    levothyroxine  100 mcg Oral or NG Tube QAM AC    pantoprazole  40 mg Per Feeding Tube QAM AC    Or    pantoprazole  40 mg Intravenous QAM AC    polyethylene glycol  17 g Oral or NG Tube  Daily    senna-docusate  1 tablet Oral or NG Tube BID    sodium chloride (PF)  3 mL Intracatheter Q8H      dextrose      HYDROmorphone 0.3 mg/hr (10/27/23 0744)    insulin regular 1 Units/hr (10/27/23 0817)    propofol Stopped (10/27/23 0801)    And    - MEDICATION INSTRUCTIONS -      norepinephrine 0.12 mcg/kg/min (10/27/23 0744)    sodium chloride 20 mL/hr at 10/27/23 0800               Physical Examination:   Temp:  [99.1  F (37.3  C)-99.8  F (37.7  C)] 99.6  F (37.6  C)  Pulse:  [] 97  Resp:  [16-25] 20  BP: ()/(41-70) 116/48  MAP:  [41 mmHg-180 mmHg] 64 mmHg  Arterial Line BP: ()/() 93/51  FiO2 (%):  [30 %-50 %] 45 %  SpO2:  [89 %-96 %] 95 %    Intake/Output Summary (Last 24 hours) at 10/27/2023 0827  Last data filed at 10/27/2023 0800  Gross per 24 hour   Intake 2080.56 ml   Output 465 ml   Net 1615.56 ml     Wt Readings from Last 4 Encounters:   10/27/23 103.7 kg (228 lb 11.2 oz)   10/19/23 95 kg (209 lb 8 oz)   09/28/23 97.1 kg (214 lb)   09/14/23 97.1 kg (214 lb)     Arterial Line BP: ()/() 93/51  MAP:  [41 mmHg-180 mmHg] 64 mmHg  BP - Mean:  [54-88] 78  Vent Mode: CMV/AC  (Continuous Mandatory Ventilation/ Assist Control)  FiO2 (%): 45 %  Resp Rate (Set): 20 breaths/min  Tidal Volume (Set, mL): 500 mL  PEEP (cm H2O): 5 cmH2O  Resp: 20    Recent Labs   Lab 10/27/23  0558 10/26/23  2359 10/26/23  1801 10/26/23  0958   PH 7.28* 7.31* 7.30* 7.27*   PCO2 37 34* 35 33*   PO2 81 121* 76* 75*   HCO3 17* 17* 17* 15*   O2PER 45 50 30 30       GEN: no acute distress, comfortably resting in bed at 30 degrees on ventilator  HEENT: head ncat, sclera anicteric   PULM: unlabored synchronous with vent, mild rhonchi anteriorly    CV/COR: RRR   ABD: soft nontender, hypoactive bowel sounds, no mass  EXT:  Edema   warm  NEURO: Sedated. Opens eyes to voice. Moving all extremities to stimulation only but less so in legs  SKIN: no obvious rash; no cyanosis or mottling    LINES: clean, dry  intact         Data:   All data and imaging reviewed     ROUTINE ICU LABS (Last four results)  CMP  Recent Labs   Lab 10/27/23  0815 10/27/23  0557 10/27/23  0556 10/27/23  0403 10/26/23  0417 10/26/23  0414 10/26/23  0008 10/26/23  0000 10/25/23  2116 10/25/23  2008 10/25/23  1816 10/25/23  1629   NA  --   --  142  --   --  145  --   --   --  146*  --  145   POTASSIUM  --   --  4.7  --   --  4.5  --   --   --  4.1  --  4.9   CHLORIDE  --   --  113*  --   --  114*  --   --   --  115*  --  113*   CO2  --   --  16*  --   --  14*  --   --   --  8*  --  10*   ANIONGAP  --   --  13  --   --  17*  --   --   --  23*  --  22*   * 133* 139* 129*   < > 154*   < >  --    < > 248*   < > 366*   BUN  --   --  39.8*  --   --  22.8  --   --   --  17.5  --  15.4   CR  --   --  4.41*  --   --  2.17*  --   --   --  1.40*  --  1.13*   GFRESTIMATED  --   --  9*  --   --  22*  --   --   --  38*  --  49*   TERESA  --   --  7.0*  --   --  7.7*  --   --   --  8.1*  --  8.1*   MAG  --   --  2.2  --   --  2.7*  --  1.2*  --   --   --  1.5*   PHOS  --   --  4.2  --   --  3.8  --   --   --   --   --  8.1*   PROTTOTAL  --   --  3.8*  --   --  4.4*  --   --   --  4.6*  --  3.6*   ALBUMIN  --   --  2.5*  --   --  2.9*  --   --   --  3.2*  --  2.5*   BILITOTAL  --   --  0.2  --   --  0.4  --   --   --  0.6  --  0.3   ALKPHOS  --   --  47  --   --  46  --   --   --  53  --  42   AST  --   --  858*  --   --  468*  --   --   --  430*  --  233*   ALT  --   --  137*  --   --  234*  --   --   --  306*  --  176*    < > = values in this interval not displayed.     CBC  Recent Labs   Lab 10/27/23  0556 10/26/23  2351 10/26/23  1801 10/26/23  0958 10/26/23  0738 10/26/23  0414   WBC 12.7*  --   --  15.9* 18.1* 19.1*  19.1*   RBC 2.43*  --   --  2.93* 3.12* 3.37*   HGB 7.2* 7.5* 8.2* 8.6* 9.5* 10.3*   HCT 21.8*  --   --  25.9* 27.4* 29.6*   MCV 90  --   --  88 88 88   MCH 29.6  --   --  29.4 30.4 30.6   MCHC 33.0  --   --  33.2 34.7 34.8   RDW 16.7*  --   " --  15.8* 15.8* 15.4*   PLT 74*  --   --  77* 92* 96*     INR  Recent Labs   Lab 10/26/23  0414 10/25/23  2114 10/25/23  1629 10/25/23  1356   INR 1.58* 1.73* 2.11* 1.62*     Arterial Blood Gas  Recent Labs   Lab 10/27/23  0558 10/26/23  2359 10/26/23  1801 10/26/23  0958   PH 7.28* 7.31* 7.30* 7.27*   PCO2 37 34* 35 33*   PO2 81 121* 76* 75*   HCO3 17* 17* 17* 15*   O2PER 45 50 30 30       All cultures:  No results for input(s): \"CULT\" in the last 168 hours.  Recent Results (from the past 24 hour(s))   US Renal Complete Non-Vascular    Narrative    US RENAL COMPLETE NON-VASCULAR 10/26/2023 10:06 AM    CLINICAL HISTORY: LISA, oliguric.    TECHNIQUE: Routine Bilateral Renal and Bladder Ultrasound.    COMPARISON: CT abdomen and pelvis 1/12/2022. Renal ultrasound  1/24/2022.    FINDINGS:    RIGHT KIDNEY: 10.3 x 5.3 x 6.2 cm. Normal echogenicity without  hydronephrosis or masses. Lower pole echogenic structure without  convincing shadowing or twinkle artifact is likely artifactual versus  less likely nonobstructing stone.     LEFT KIDNEY: 10.3 x 6.0 x 6.3 cm. Normal echogenicity without  hydronephrosis or masses. Left inferior pole cyst measuring 0.9 cm; no  follow-up necessary.    BLADDER: Urinary bladder is decompressed by Spicer catheter.      Impression    IMPRESSION:  1.  No collecting system dilatation.    ANDREZ MORRIS MD         SYSTEM ID:  [id number]     Vega Leonard, MS4    Billing: This patient is critically ill: Yes. Total critical care time today 60 min. Please note that this did not include the time spent placing arterial line.     I fully assessed patient myself including interval history, physical exam and review of all data. I also discussed case with Vega Leonard including bedside exam and appreciate his assistance and fine care. My exam and assessment as documented above.     Peace carbajal  October 27, 2023       "

## 2023-10-27 NOTE — PROGRESS NOTES
[facility]    Neurosurgery  Daily Note    Assessment & Plan   Procedure(s):  Thoracic 11 to pelvis robotic decompression and fusion   2 Days Post-Op  Intubated, sedated, limited neuro exam  ELY right 80/12 hours, left 95/12 hours  Dressing in place     Plan:  -OK to transfuse per Dr. Waite. No plans for extubation this AM  -check coags  -Defer medical cares to intensivist   -Wean to extubate as able  -Pain management as needed  -Routine wound care  -Continue drains for now  -Continue antibiotics for now  -Will need orthotics consult for TLSO brace when medically stable    Plans reviewed with Dr. Mariann Matute PA-C  [facility] Neurosurgery  [facility]  [address]  [address]  [place] [address]    Tel [phone]  Pager [phone]    Principal Problem:    S/P lumbar spinal fusion  Active Problems:    Acute kidney failure with tubular necrosis (H)     Irina Matute PA-C    Interval History   Stable     Physical Exam   Temp: 99  F (37.2  C) Temp src: Axillary BP: 134/51 Pulse: 101   Resp: 20 SpO2: 97 % O2 Device: Mechanical Ventilator    Vitals:    10/25/23 0626 10/26/23 0400 10/27/23 0200   Weight: 209 lb (94.8 kg) 225 lb 3.2 oz (102.2 kg) 228 lb 11.2 oz (103.7 kg)     Vital Signs with Ranges  Temp:  [99  F (37.2  C)-99.8  F (37.7  C)] 99  F (37.2  C)  Pulse:  [] 101  Resp:  [16-25] 20  BP: ()/(41-70) 134/51  MAP:  [41 mmHg-180 mmHg] 71 mmHg  Arterial Line BP: ()/() 76/67  FiO2 (%):  [30 %-50 %] 45 %  SpO2:  [89 %-100 %] 97 %  I/O last 3 completed shifts:  In: 3332.24 [I.V.:2092.24; NG/GT:240; IV Piggyback:1000]  Out: 500 [Urine:61; Drains:439]    Intubated sedated  No movement no pain  Dressing stable, no ongoing drainage        Medications    dextrose      HYDROmorphone 0.3 mg/hr (10/27/23 0744)    insulin regular 1 Units/hr (10/27/23 0817)    propofol Stopped (10/27/23 0801)    And    - MEDICATION INSTRUCTIONS -       norepinephrine 0.12 mcg/kg/min (10/27/23 0954)    sodium chloride 20 mL/hr at 10/27/23 0800       [Held by provider] acetaminophen  975 mg Oral or NG Tube Q8H    ceFAZolin  1 g Intravenous Q24H    chlorhexidine  15 mL Mouth/Throat Q12H    levothyroxine  100 mcg Oral or NG Tube QAM AC    pantoprazole  40 mg Per Feeding Tube QAM AC    Or    pantoprazole  40 mg Intravenous QAM AC    polyethylene glycol  17 g Oral or NG Tube Daily    senna-docusate  1 tablet Oral or NG Tube BID    sodium chloride (PF)  3 mL Intracatheter Q8H       Plans discussed with Dr. Waite who was in agreement with plans    Irina Matute PA-C  [facility] Neurosurgery  [address]  [address] [place] [id number]    Tel [phone]  Pager [phone]

## 2023-10-27 NOTE — CONSULTS
[facility]    Nephrology Consultation     [facility]    Date of Admission:  10/25/2023    Assessment & Plan     <SEVERE LISA, ATN FROM HEMORRHAGIC SHOCK    ~Presumed ATN related to hemorrhagic shock, status post resuscitation.     ~The chemistries have remained manageable with medical therapy at the present time, but is at high risk of requiring RRT.     ~Volume status controlled presently.     -Will treat acidosis with intermittent bicarbonate infusions.     -Monitor UOP, I/O, renal function per usual.     -Ongoing assessment for need for RRT.        Edgar Zaragoza MD  [facility], Nephrology  Cell:[phone]  Pager:[phone]    Securely message with the Vocera Web Console (learn more here)  Text page via [employer] Paging/Directory         /\/\/\/\/\/\/\/\/\/\/\/\/\/\/\/\/\/\/\/\/\/\/\/\/\/\    Reason for Consult     I was asked to see the patient for LISA.    Primary Care Physician     Kisha Stinson    Chief Complaint     Post operative spinal decompression and fusion.         History of Present Illness     Mya Hui is a 83 year old female who presented 10/25 for spinal fusion and decompression surgery.     Following the surgery the patient developed hemorrhagic shock related to her procedure.     She required 7 units of blood as off yesterday with platelets and FFP/cryo.     The patient has a medical history as noted below but is most remarkable for HTN, DM and obesity    She has required maximal supportive care with mechanical ventilation, vasopressors.     At baseline the patient has mild CKD with an eGFR in the IIIa range by and large. She has not had albuminuria.     History is obtained from the patient and chart review.      At the time of the exam she is intubated and sedated.     Past Medical History   I have reviewed this patient's medical history and updated it with pertinent information if needed.   Past Medical History:    Diagnosis Date    Acute cholecystitis 3/23/2018    Acute kidney injury (H24) 9/10/2017    Arthritis     Dehydration 9/4/2017    Diabetes type 2, controlled (H)     Elevated lactic acid level 9/10/2017    GERD (gastroesophageal reflux disease)     History of renal calculi     HTN, goal below 140/90     Hyperlipidaemia LDL goal < 100     Hypokalemia 9/3/2017    Hypothyroid     Insomnia     Left carotid stenosis     Migraine     Motion sickness     PONV (postoperative nausea and vomiting)     Sciatica of right side 6/28/2013    Type 2 diabetes mellitus without complication, without long-term current use of insulin (H) 2/16/2017       Past Surgical History   I have reviewed this patient's surgical history and updated it with pertinent information if needed.  Past Surgical History:   Procedure Laterality Date    BUNIONECTOMY      Right foot    CATARACT IOL, RT/LT Bilateral 2017    COLONOSCOPY  7/12/2013    Procedure: COLONOSCOPY;  Colonoscopy;  Surgeon: Giovany Espinoza MD;  Location: [facility]    COLONOSCOPY N/A 1/31/2019    Procedure: Combined Colonoscopy, Single Or Multiple Biopsy/Polypectomy By Biopsy;  Surgeon: Efren Osorio MD;  Location: [facility]    COLONOSCOPY WITH CO2 INSUFFLATION N/A 1/31/2019    Procedure: COLONOSCOPY WITH CO2 INSUFFLATION;  Surgeon: Efren Osorio MD;  Location: [facility] OR    FRACTURE TX, ANKLE RT/LT      Left ankle    FUSION, SPINE, LUMBAR, 3 OR MORE LEVELS, POSTERIOR APPROACH, ROBOTIC-ASSISTED N/A 10/25/2023    Procedure: Thoracic 11 to pelvis robotic decompression and fusion;  Surgeon: Mumtaz Waite MD;  Location:  OR     CYSTOURETHROSCOPY W/ URETEROSCOPY &/OR PYELOSCOPY; W/ LITHOTRIPSY      HC REVISE MEDIAN N/CARPAL TUNNEL SURG      Right wrist    INJECT EPIDURAL LUMBAR Right 12/27/2018    Procedure: INJECT EPIDURAL LUMBAR right foraminal narrowing severe at L4-L5 and moderate at L5-S1;  Surgeon: Gilbert Mcclure MD;  Location: [facility]    INJECT EPIDURAL  TRANSFORAMINAL Bilateral 5/27/2021    Procedure: Bilateral Lumbar 5-sacral 1 Epidural steroid injection;  Surgeon: Gilbert Mcclure MD;  Location: [facility]    INJECT EPIDURAL TRANSFORAMINAL Right 6/17/2022    Procedure: Attempted right Lumbar 4-5 and Lumbar 5-Sacral 1 epidural injections with completion of only the right Lumbar 5-Sacral1 Transforaminal Epidural Steroid Injection with fluoroscopic guidance and contrast;  Surgeon: Gilbert Mcclure MD;  Location: [facility]    INJECT EPIDURAL TRANSFORAMINAL Right 7/13/2023    Procedure: Lumbar 3-4 interlaminar epidural steroid injection using fluoroscopic guidance with contrast dye, Right;  Surgeon: Gilbert Mcclure MD;  Location: [facility]    INJECT JOINT SACROILIAC  4/10/2014    Procedure: INJECT JOINT SACROILIAC;  Sacroiliac Joint Injection;  Surgeon: Gilbert Mcclure MD;  Location: [facility]    INJECT JOINT SACROILIAC Left 4/11/2019    Procedure: INJECT JOINT SACROILIAC- LEFT;  Surgeon: Gilbert Mcclure MD;  [facility]: [facility]    INJECT JOINT SACROILIAC Right 3/3/2023    Procedure: Fluoroscopically-guided injection into the Right Sacroiliac joint;  Surgeon: Gilbert Mcclure MD;  [facility]: [facility]    LAPAROSCOPIC CHOLECYSTECTOMY N/A 3/24/2018    Procedure: LAPAROSCOPIC CHOLECYSTECTOMY;  Laparoscopic Cholecystectomy;  Surgeon: Berry Allen DO;  Location: [facility]       Prior to Admission Medications   Prior to Admission Medications   Prescriptions Last Dose Informant Patient Reported? Taking?   Incontinence Supply Disposable (PROTECTIVE UNDERWEAR SUPER LG) MISC   No No   Sig: 3 each daily   acetaminophen (TYLENOL) 500 MG tablet 10/25/2023 at PRN Self Yes Yes   Sig: Take 500-1,000 mg by mouth every 6 hours as needed for mild pain   blood glucose (ACCU-CHEK GUIDE) test strip   No No   Sig: Use to test blood sugar 1 times daily or as directed.   blood glucose monitoring (ACCU-CHEK FASTCLIX) lancets   No No   Sig: USE TO TEST BLOOD SUGARS ONCE DAILY   cholestyramine (QUESTRAN) 4 g packet  10/24/2023 at PM Self No Yes   Sig: Take 1 packet (4 g) by mouth 2 times daily (with meals)   diltiazem ER COATED BEADS (CARDIZEM CD/CARTIA XT) 120 MG 24 hr capsule 10/25/2023 at AM Self No Yes   Sig: Take 1 capsule (120 mg) by mouth 2 times daily   levothyroxine (SYNTHROID/LEVOTHROID) 100 MCG tablet 10/25/2023 at AM Self No Yes   Sig: Take 1 tablet (100 mcg) by mouth daily   lisinopril (ZESTRIL) 40 MG tablet 10/24/2023 at PM Self No Yes   Sig: Take 1 tablet (40 mg) by mouth daily   order for DME   No No   Sig: Equipment being ordered: [employer] shoes and insoles   oxybutynin ER (DITROPAN XL) 10 MG 24 hr tablet 10/24/2023 at PM Self No Yes   Sig: Take 1 tablet (10 mg) by mouth daily   pantoprazole (PROTONIX) 40 MG EC tablet 10/24/2023 at PM Self No Yes   Sig: Take 1 tablet (40 mg) by mouth 2 times daily   polyethylene glycol (MIRALAX) 17 g packet Unknown at PRN Self Yes Yes   Sig: Take 1 packet by mouth every evening as needed   potassium chloride ER (K-TAB/KLOR-CON) 10 MEQ CR tablet 10/24/2023 at PM Self No Yes   Sig: Take 1 tablet (10 mEq) by mouth 3 times daily   pravastatin (PRAVACHOL) 40 MG tablet 10/24/2023 at PM Self No Yes   Sig: Take 1 tablet (40 mg) by mouth daily      Facility-Administered Medications: None     [unfilled]  Allergies   Allergies   Allergen Reactions    Fentanyl Nausea and Vomiting     VERY sensitive to most narcotics if not all.    Morphine And Related Nausea       Social History   I have reviewed this patient's social history and updated it with pertinent information if needed. Mya Hui  reports that she has never smoked. She has never used smokeless tobacco. She reports current alcohol use. She reports that she does not use drugs. The patient's usual occupation:    Family History   I have reviewed this patient's family history and updated it with pertinent information if needed. No family history of kidney disease.   Family History   Problem Relation Age of Onset     Hypertension Brother     Cerebrovascular Disease Brother     Diabetes Father     Cardiovascular Father     Diabetes Brother         Borderline    Breast Cancer Mother     Diabetes Brother     Blood Disease Brother     Cardiovascular Brother         MI       Review of Systems   The 14 point Review of Systems is negative other than noted in the HPI.     Physical Exam   Temp: 98.3  F (36.8  C) Temp src: Axillary BP: 122/83 Pulse: 93   Resp: 20 SpO2: 97 % O2 Device: Mechanical Ventilator    Vital Signs with Ranges  Temp:  [98.3  F (36.8  C)-99.6  F (37.6  C)] 98.3  F (36.8  C)  Pulse:  [] 93  Resp:  [11-22] 20  BP: ()/(41-83) 122/83  MAP:  [41 mmHg-180 mmHg] 67 mmHg  Arterial Line BP: ()/() 209/72  FiO2 (%):  [30 %-50 %] 45 %  SpO2:  [89 %-100 %] 97 %  228 lbs 11.2 oz    GENERAL APPEARANCE:  Alert, in no distress, pleasant, cooperative, oriented x self, place and recent events.   EYES:  EOM, lids, pupils and irises normal, sclera clear and conjunctiva normal  ENT:  Mouth normal, moist mucous membranes, nose normal without drainage or crusting,   hearing acuity grossly intact  NECK: Supple, symmetrical, trachea midline, No JVD  RESP:  Respiratory effort normal,no respiratory distress, Lung sounds clear   CV:  Auscultation of heart done, rate and rhythm controlled and regular, no murmur, no rub or gallop.   ABDOMEN:  Soft, nontender, no palpable masses or organomegaly.  EXT: Edema none bilateral lower extremities.  No gross deformities.  ACCESS:  SKIN:  skin on extremities warm, dry and intact without rashes  NEURO: cranial nerves grossly intact, no facial asymmetry, no speech deficits and able to follow directions, moves all extremities symmetrically  PSYCH:  insight and judgement and memory appear intact, affect and mood normal    Data   BMP  Recent Labs   Lab 10/27/23  1244 10/27/23  1030 10/27/23  0815 10/27/23  0557 10/27/23  0556 10/26/23  0417 10/26/23  0414 10/25/23  2116 10/25/23  2008  "10/25/23  1816 10/25/23  1629   NA  --   --   --   --  142  --  145  --  146*  --  145   POTASSIUM  --   --   --   --  4.7  --  4.5  --  4.1  --  4.9   CHLORIDE  --   --   --   --  113*  --  114*  --  115*  --  113*   TERESA  --   --   --   --  7.0*  --  7.7*  --  8.1*  --  8.1*   CO2  --   --   --   --  16*  --  14*  --  8*  --  10*   BUN  --   --   --   --  39.8*  --  22.8  --  17.5  --  15.4   CR  --   --   --   --  4.41*  --  2.17*  --  1.40*  --  1.13*   * 128* 117* 133* 139*   < > 154*   < > 248*   < > 366*    < > = values in this interval not displayed.     [email or web address](phos:4)  CBC)  Recent Labs   Lab 10/27/23  1233 10/27/23  0556 10/26/23  2351 10/26/23  1801 10/26/23  0958 10/26/23  0738 10/26/23  0414   WBC  --  12.7*  --   --  15.9* 18.1* 19.1*  19.1*   HGB 7.1* 7.2* 7.5* 8.2* 8.6* 9.5* 10.3*   HCT  --  21.8*  --   --  25.9* 27.4* 29.6*   MCV  --  90  --   --  88 88 88   PLT  --  74*  --   --  77* 92* 96*     Recent Labs   Lab 10/27/23  0556   *   *   ALKPHOS 47   BILITOTAL 0.2     Recent Labs   Lab 10/27/23  1022   INR 1.57*     No results found for: \"D2VIT\", \"D3VIT\", \"DTOT\"  Recent Labs   Lab 10/27/23  1233 10/27/23  0556   HGB 7.1* 7.2*   HCT  --  21.8*   MCV  --  90     No results for input(s): \"PTHI\" in the last 168 hours.    =========================  "

## 2023-10-27 NOTE — PROGRESS NOTES
[facility] ICU RESPIRATORY NOTE        Date of Admission: 10/25/2023    Date of Intubation (most recent): 10/25/2023    Reason for Mechanical Ventilation: Airway Protection     Number of Days on Mechanical Ventilation: 3    Met Criteria for Spontaneous Breathing Trial: NO    Reason for No Spontaneous Breathing Trial: Per MD.     Significant Events Today: none    ABG Results:   Recent Labs   Lab 10/27/23  1233 10/27/23  0558 10/26/23  2359 10/26/23  1801   PH 7.27* 7.28* 7.31* 7.30*   PCO2 37 37 34* 35   PO2 109* 81 121* 76*   HCO3 17* 17* 17* 17*   O2PER 45 45 50 30         Current Vent Settings: Vent Mode: CMV/AC  (Continuous Mandatory Ventilation/ Assist Control)  FiO2 (%): 45 %  Resp Rate (Set): 20 breaths/min  Tidal Volume (Set, mL): 500 mL  PEEP (cm H2O): 5 cmH2O  Resp: 20      Skin Assessment: clean and dry.     Plan: Continue to wean from mechanical ventilation and oxygen as tolerated per protocol.

## 2023-10-27 NOTE — PROCEDURES
[facility]    Arterial line placement    Date/Time: 10/27/2023 12:21 PM    Performed by: Elana Dickinson MD  Authorized by: Edgar Brown MD      UNIVERSAL PROTOCOL   Site Marked: Yes  Prior Images Obtained and Reviewed:  Yes  Required items: Required blood products, implants, devices and special equipment available    Patient identity confirmed:  Hospital-assigned identification number  Patient was reevaluated immediately before administering moderate or deep sedation or anesthesia  Confirmation Checklist:  Procedure was appropriate and matched the consent or emergent situation  Universal Protocol: the Joint Commission Universal Protocol was followed    Preparation: Patient was prepped and draped in usual sterile fashion    ESBL (mL):  5  Indication:  multiple ABGs hemodynamic monitoring  Location:  Right radial       ANESTHESIA    Anesthesia:  Local infiltration  Local Anesthetic:  Lidocaine 1% without epinephrine  Anesthetic Total (mL):  1.5      SEDATION  Patient Sedated: Yes    Vital signs: Vital signs monitored during sedation      PROCEDURE DETAILS  Andi's Test Normal?: Andi's test not abnormal    Seldinger technique: Seldinger technique used    Number of Attempts:  2  Post-procedure:  Line sutured and dressing applied  CMS: normal    PROCEDURE  Describe Procedure: Procedure performed in Right forearm following Seldinger technique.  Patient Tolerance:  Patient tolerated the procedure well with no immediate complications  Length of time physician/provider present for 1:1 monitoring during sedation: 20      Elana Dickinson MD, [facility]  Surgical Critical Care Fellow

## 2023-10-27 NOTE — PROGRESS NOTES
[facility] ICU RESPIRATORY NOTE        Date of Admission: 10/25/2023    Date of Intubation (most recent): 10/25/2023    Reason for Mechanical Ventilation: Airway Protection    Number of Days on Mechanical Ventilation: 3    Met Criteria for Spontaneous Breathing Trial: No    Reason for No Spontaneous Breathing Trial: Per MD    Significant Events Today: None.    ABG Results:   Recent Labs   Lab 10/26/23  2359 10/26/23  1801 10/26/23  0958 10/26/23  0738   PH 7.31* 7.30* 7.27* 7.26*   PCO2 34* 35 33* 34*   PO2 121* 76* 75* 77*   HCO3 17* 17* 15* 15*   O2PER 50 30 30 30         Current Vent Settings: Vent Mode: CMV/AC  (Continuous Mandatory Ventilation/ Assist Control)  FiO2 (%): 45 %  Resp Rate (Set): 20 breaths/min  Tidal Volume (Set, mL): 500 mL  PEEP (cm H2O): 5 cmH2O  Resp: 20      Skin Assessment: Intact    Plan: continue full vent support and assess  readiness for daily SBT.    RT Alphonso on 10/27/2023 at 5:25 AM

## 2023-10-27 NOTE — PROGRESS NOTES
Summary:  10/27 [id number]  Neuro: Propofol and dilaudid stopped this morning. Open eyes to speech, withdraws to pain on lower extremities. No response to pain in upper extremities. Left eye dysconjugate and deviated upward. R eye intermittently deviated upward. Both eyes reactive to light.   Cardiac: SR. MAP>65 with levo. Art line replaced -WDL.   Resp: Full vent support. 45%/500/20/5  GI/: BS hypoactive, no BM. Minimal urine output - 54mL total during 12 hour shift. Lasix 100mg given and Diuril 500mg given.   Drains: Jpx2 - bright red bloody drainage. 2 PIV. Art line. Triple Lumen internal jugular.   Lab: Hgb 7.1 - transfused 1 unit. Recheck 7.5- Second unit currently transfusing. . Cr 4.4. 1 amp of bicarb given.    Plan: wean to extubate as able. Manage pain with PRNs.

## 2023-10-27 NOTE — PROGRESS NOTES
SHIFT SUMMARY [id number]     NEURO: Opens eyes to pain, moves all extremities when sedation lightened, intermittent withdrawal of extremities with sedation. Left eye dysconjugate. Diluaid infusion for pain.  CARDIAC: SR with ectopy. Had one run of SVT that was self-limiting. MAP >65 with levo. Art line dampened and not correlating to cuff.  RESP: Full vent support. 45%/500/20/5  GI/: BS hypoactive, no BM this shift. UOP only about 1-2mL/hour.  DRAINS: JPx2- output 10-20 per drain per hour. Bright red blood in color.  LAB: Hgb 7.2- MD aware, transfuse if less than 7.     PLAN: Wean levo, wean sedation to extubate?    Inocencia Zee RN

## 2023-10-28 ENCOUNTER — APPOINTMENT (OUTPATIENT)
Dept: GENERAL RADIOLOGY | Facility: CLINIC | Age: 83
DRG: 453 | End: 2023-10-28
Attending: STUDENT IN AN ORGANIZED HEALTH CARE EDUCATION/TRAINING PROGRAM
Payer: COMMERCIAL

## 2023-10-28 LAB
ALBUMIN SERPL BCG-MCNC: 2.5 G/DL (ref 3.5–5.2)
ALLEN'S TEST: ABNORMAL
ALP SERPL-CCNC: 51 U/L (ref 35–104)
ALT SERPL W P-5'-P-CCNC: 171 U/L (ref 0–50)
ANION GAP SERPL CALCULATED.3IONS-SCNC: 17 MMOL/L (ref 7–15)
AST SERPL W P-5'-P-CCNC: 1913 U/L (ref 0–45)
BASE EXCESS BLDA CALC-SCNC: -7.8 MMOL/L (ref -9–1.8)
BILIRUB SERPL-MCNC: 0.4 MG/DL
BUN SERPL-MCNC: 54.6 MG/DL (ref 8–23)
CALCIUM SERPL-MCNC: 7.4 MG/DL (ref 8.8–10.2)
CHLORIDE SERPL-SCNC: 112 MMOL/L (ref 98–107)
CREAT SERPL-MCNC: 6.02 MG/DL (ref 0.51–0.95)
DEPRECATED HCO3 PLAS-SCNC: 16 MMOL/L (ref 22–29)
EGFRCR SERPLBLD CKD-EPI 2021: 7 ML/MIN/1.73M2
ERYTHROCYTE [DISTWIDTH] IN BLOOD BY AUTOMATED COUNT: 17.4 % (ref 10–15)
GLUCOSE BLDC GLUCOMTR-MCNC: 106 MG/DL (ref 70–99)
GLUCOSE BLDC GLUCOMTR-MCNC: 113 MG/DL (ref 70–99)
GLUCOSE BLDC GLUCOMTR-MCNC: 116 MG/DL (ref 70–99)
GLUCOSE BLDC GLUCOMTR-MCNC: 117 MG/DL (ref 70–99)
GLUCOSE BLDC GLUCOMTR-MCNC: 118 MG/DL (ref 70–99)
GLUCOSE BLDC GLUCOMTR-MCNC: 122 MG/DL (ref 70–99)
GLUCOSE BLDC GLUCOMTR-MCNC: 123 MG/DL (ref 70–99)
GLUCOSE BLDC GLUCOMTR-MCNC: 124 MG/DL (ref 70–99)
GLUCOSE BLDC GLUCOMTR-MCNC: 131 MG/DL (ref 70–99)
GLUCOSE BLDC GLUCOMTR-MCNC: 132 MG/DL (ref 70–99)
GLUCOSE BLDC GLUCOMTR-MCNC: 96 MG/DL (ref 70–99)
GLUCOSE SERPL-MCNC: 131 MG/DL (ref 70–99)
HCO3 BLD-SCNC: 17 MMOL/L (ref 21–28)
HCT VFR BLD AUTO: 25.6 % (ref 35–47)
HGB BLD-MCNC: 8.3 G/DL (ref 11.7–15.7)
HGB BLD-MCNC: 8.4 G/DL (ref 11.7–15.7)
HGB BLD-MCNC: 8.5 G/DL (ref 11.7–15.7)
HGB BLD-MCNC: 8.5 G/DL (ref 11.7–15.7)
HGB BLD-MCNC: 8.6 G/DL (ref 11.7–15.7)
MAGNESIUM SERPL-MCNC: 2.3 MG/DL (ref 1.7–2.3)
MCH RBC QN AUTO: 29.2 PG (ref 26.5–33)
MCHC RBC AUTO-ENTMCNC: 33.2 G/DL (ref 31.5–36.5)
MCV RBC AUTO: 88 FL (ref 78–100)
O2/TOTAL GAS SETTING VFR VENT: 40 %
PCO2 BLD: 32 MM HG (ref 35–45)
PH BLD: 7.34 [PH] (ref 7.35–7.45)
PHOSPHATE SERPL-MCNC: 4.7 MG/DL (ref 2.5–4.5)
PLATELET # BLD AUTO: 52 10E3/UL (ref 150–450)
PO2 BLD: 100 MM HG (ref 80–105)
POTASSIUM SERPL-SCNC: 4.8 MMOL/L (ref 3.4–5.3)
PROT SERPL-MCNC: 4 G/DL (ref 6.4–8.3)
RBC # BLD AUTO: 2.91 10E6/UL (ref 3.8–5.2)
SODIUM SERPL-SCNC: 145 MMOL/L (ref 135–145)
WBC # BLD AUTO: 8.3 10E3/UL (ref 4–11)

## 2023-10-28 PROCEDURE — 84100 ASSAY OF PHOSPHORUS: CPT | Performed by: INTERNAL MEDICINE

## 2023-10-28 PROCEDURE — 99291 CRITICAL CARE FIRST HOUR: CPT | Mod: 24 | Performed by: INTERNAL MEDICINE

## 2023-10-28 PROCEDURE — 250N000013 HC RX MED GY IP 250 OP 250 PS 637: Performed by: INTERNAL MEDICINE

## 2023-10-28 PROCEDURE — 258N000003 HC RX IP 258 OP 636: Performed by: INTERNAL MEDICINE

## 2023-10-28 PROCEDURE — 999N000065 XR CHEST PORT 1 VIEW

## 2023-10-28 PROCEDURE — 80053 COMPREHEN METABOLIC PANEL: CPT | Performed by: INTERNAL MEDICINE

## 2023-10-28 PROCEDURE — 82803 BLOOD GASES ANY COMBINATION: CPT | Performed by: INTERNAL MEDICINE

## 2023-10-28 PROCEDURE — 250N000011 HC RX IP 250 OP 636: Mod: JZ | Performed by: INTERNAL MEDICINE

## 2023-10-28 PROCEDURE — 250N000013 HC RX MED GY IP 250 OP 250 PS 637: Performed by: NURSE PRACTITIONER

## 2023-10-28 PROCEDURE — 94003 VENT MGMT INPAT SUBQ DAY: CPT

## 2023-10-28 PROCEDURE — 250N000011 HC RX IP 250 OP 636: Performed by: INTERNAL MEDICINE

## 2023-10-28 PROCEDURE — 85018 HEMOGLOBIN: CPT | Performed by: INTERNAL MEDICINE

## 2023-10-28 PROCEDURE — 200N000001 HC R&B ICU

## 2023-10-28 PROCEDURE — 999N000157 HC STATISTIC RCP TIME EA 10 MIN

## 2023-10-28 PROCEDURE — 83735 ASSAY OF MAGNESIUM: CPT | Performed by: INTERNAL MEDICINE

## 2023-10-28 PROCEDURE — 85027 COMPLETE CBC AUTOMATED: CPT | Performed by: INTERNAL MEDICINE

## 2023-10-28 RX ORDER — DILTIAZEM HCL 60 MG
60 TABLET ORAL EVERY 6 HOURS SCHEDULED
Status: DISCONTINUED | OUTPATIENT
Start: 2023-10-28 | End: 2023-11-13

## 2023-10-28 RX ORDER — FUROSEMIDE 10 MG/ML
100 INJECTION INTRAMUSCULAR; INTRAVENOUS ONCE
Status: COMPLETED | OUTPATIENT
Start: 2023-10-28 | End: 2023-10-28

## 2023-10-28 RX ORDER — DEXTROSE MONOHYDRATE 100 MG/ML
INJECTION, SOLUTION INTRAVENOUS CONTINUOUS PRN
Status: DISCONTINUED | OUTPATIENT
Start: 2023-10-28 | End: 2023-11-03

## 2023-10-28 RX ORDER — HYDRALAZINE HYDROCHLORIDE 20 MG/ML
10 INJECTION INTRAMUSCULAR; INTRAVENOUS
Status: COMPLETED | OUTPATIENT
Start: 2023-10-28 | End: 2023-10-29

## 2023-10-28 RX ADMIN — SENNOSIDES AND DOCUSATE SODIUM 1 TABLET: 8.6; 5 TABLET ORAL at 20:05

## 2023-10-28 RX ADMIN — CHLORHEXIDINE GLUCONATE 15 ML: 1.2 RINSE ORAL at 08:18

## 2023-10-28 RX ADMIN — POLYETHYLENE GLYCOL 3350 17 G: 17 POWDER, FOR SOLUTION ORAL at 08:13

## 2023-10-28 RX ADMIN — FUROSEMIDE 20 MG/HR: 10 INJECTION, SOLUTION INTRAMUSCULAR; INTRAVENOUS at 21:03

## 2023-10-28 RX ADMIN — FUROSEMIDE 20 MG/HR: 10 INJECTION, SOLUTION INTRAMUSCULAR; INTRAVENOUS at 15:40

## 2023-10-28 RX ADMIN — Medication 40 MG: at 08:13

## 2023-10-28 RX ADMIN — DILTIAZEM HYDROCHLORIDE 60 MG: 60 TABLET, FILM COATED ORAL at 23:01

## 2023-10-28 RX ADMIN — FUROSEMIDE 100 MG: 10 INJECTION, SOLUTION INTRAMUSCULAR; INTRAVENOUS at 08:18

## 2023-10-28 RX ADMIN — OXYCODONE HYDROCHLORIDE 2.5 MG: 5 TABLET ORAL at 15:39

## 2023-10-28 RX ADMIN — OXYCODONE HYDROCHLORIDE 5 MG: 5 TABLET ORAL at 06:28

## 2023-10-28 RX ADMIN — CHLORHEXIDINE GLUCONATE 15 ML: 1.2 RINSE ORAL at 20:01

## 2023-10-28 RX ADMIN — SENNOSIDES AND DOCUSATE SODIUM 1 TABLET: 8.6; 5 TABLET ORAL at 08:18

## 2023-10-28 RX ADMIN — LEVOTHYROXINE SODIUM 100 MCG: 100 TABLET ORAL at 08:18

## 2023-10-28 RX ADMIN — DILTIAZEM HYDROCHLORIDE 60 MG: 60 TABLET, FILM COATED ORAL at 18:17

## 2023-10-28 RX ADMIN — DILTIAZEM HYDROCHLORIDE 60 MG: 60 TABLET, FILM COATED ORAL at 15:39

## 2023-10-28 RX ADMIN — CEFAZOLIN 1 G: 1 INJECTION, POWDER, FOR SOLUTION INTRAMUSCULAR; INTRAVENOUS at 20:01

## 2023-10-28 RX ADMIN — FUROSEMIDE 10 MG/HR: 10 INJECTION, SOLUTION INTRAMUSCULAR; INTRAVENOUS at 08:42

## 2023-10-28 ASSESSMENT — ACTIVITIES OF DAILY LIVING (ADL)
ADLS_ACUITY_SCORE: 32

## 2023-10-28 NOTE — PROGRESS NOTES
[facility]     Neurosurgery  Daily Note        Assessment & Plan  Procedure(s):  Thoracic 11 to pelvis robotic decompression and fusion   3 Days Post-Op  Intubated without sedation  ELY right 69/8 hours, left 67/8 hours  Dressing in place   Pupils right 3 mm left 4 mm reactive, no movement to painful stimuli in upper or lower extremities,  GCS 3T     Hgb 8.5 today  INR 1.57 yesterday  PTT 31    Plan:  -Defer medical cares to intensivist   -Pain management as needed  -Routine wound care  -Continue drains for now  -Continue antibiotics for now      Zamzam Ya MPAS  [facility] Neurosurgery  [facility]  [address] [place] [address]  [address] [place] [id number]    Tel [phone]  Pager [phone]

## 2023-10-28 NOTE — PROGRESS NOTES
"CLINICAL NUTRITION SERVICES  -  ASSESSMENT NOTE    RECOMMENDATIONS FOR MD/PROVIDER TO ORDER: Adjust free water per MD   Recommendations Ordered by Registered Dietitian (RD):   Type of Feeding Tube: OG  Enteral Frequency:  22 hours  Enteral Regimen: Promote at 70 mL/hr x 22 hours = 1540 kcal (15 kcal/kg), 95 g protein (1.7 g/kg), 200 g CHO, 0 g fiber, 1292 mL H2O   Free Water Flush: 30 ml q 4 hrs    - Initiate feeding @ 10 ml/hr and advance 20 ml q 12 hrs   - Do not start or advance TF rate unless K+ > 3.0, Mg++ > 1.5, Phos > 1.9  - Elevated HOB with gastric feeds    Malnutrition: Patient does not meet two of the above criteria necessary for diagnosing malnutrition     REASON FOR ASSESSMENT  Mya Hui is a 83 year old female seen by Registered Dietitian for Provider Order - Registered Dietitian to Assess and Order TF per Medical Nutrition Therapy Protocol    Admit 2/2: 84 y/o female s/p lumbar fusion T11 to pelvis presents to the ICU from PACU in hemorrhagic shock     NUTRITION HISTORY  - Information obtained from chart review and daughter at bedside  - Diet at home: Regular. Pt tries to eat a variety of foods and be mindful for diabetes.  - Allergies: NKFA    CURRENT NUTRITION ORDERS  Diet Order:   NPO     Current Intake/Tolerance: Last meal was supper on Tuesday night 10/24    GI: Daughter wasn't sure but said she was likely having regular Bms and doesn't have any other GI issues.    NUTRITION FOCUSED PHYSICAL ASSESSMENT FOR DIAGNOSING MALNUTRITION)  Yes              Observed:    No nutrition-related physical findings observed    Obtained from Chart/Interdisciplinary Team:  Edema 3+    ANTHROPOMETRICS  Height: 5' 5\"  Weight: 234 lbs 3.2 oz (106.4 kg)  Body mass index is 38.97 kg/m .  Weight Status:  Obesity Grade II BMI 35-39.9  IBW: 125 lb (56.8 kg)  % IBW: 187%  Weight History:   Wt Readings from Last 10 Encounters:   10/28/23 106.2 kg (234 lb 3.2 oz)   10/19/23 95 kg (209 lb 8 oz)   09/28/23 97.1 kg (214 " lb)   09/14/23 97.1 kg (214 lb)   07/13/23 95.3 kg (210 lb)   06/08/23 95.3 kg (210 lb)   05/02/23 93.7 kg (206 lb 8 oz)   03/03/23 95.3 kg (210 lb)   02/09/23 95.3 kg (210 lb)   12/08/22 94.8 kg (209 lb)      LABS  BUN 54.6 (H), Cr 6.02 (H), GFR 7 (L), Phos 4.7 (H), LFTs elevated, , A1c 6.3 (H),     MEDICATIONS  Levothyroxine, Lasix and Insulin drips     ASSESSED NUTRITION NEEDS PER APPROVED PRACTICE GUIDELINES:  Dosing Weight 106.4 kg ABW for energy and 56.8 kg IBW for protein     Estimated Energy Needs: 3725-1126 kcals (14-17 Kcal/Kg)  Justification: obese and vented  Estimated Protein Needs: + grams protein (1.5 - 2+ g pro/Kg)  Justification: obesity guidelines   Estimated Fluid Needs: (1 mL/Kcal)  Justification: per provider pending fluid status    MALNUTRITION:  % Weight Loss:  None noted  % Intake:  No decreased intake noted  Subcutaneous Fat Loss:  None observed  Muscle Loss:  None observed; difficult to assess habitus  Fluid Retention:  Moderate 3+    Malnutrition Diagnosis: Patient does not meet two of the above criteria necessary for diagnosing malnutrition    NUTRITION DIAGNOSIS:  Inadequate protein-energy intake related to NPO status as evidenced by need for nutrition support to meet nutritional needs.    NUTRITION INTERVENTIONS  Recommendations / Nutrition Prescription  Nutrition Support Enteral:  Type of Feeding Tube: OG  Enteral Frequency:  22 hours  Enteral Regimen: Promote at 70 mL/hr x 22 hours = 1540 kcal (15 kcal/kg), 95 g protein (1.7 g/kg), 200 g CHO, 0 g fiber, 1292 mL H2O   Free Water Flush: 30 ml q 4 hrs  Initiate feeding @ 10 ml/hr and advance 20 ml q 12 hrs     Implementation  Nutrition education: Per Provider order if indicated   EN Schedule    Nutrition Goals  Total average nutritional intake to meet a minimum of 1490 kcal (14 kcal/kg) and 85 g protein (1.5 g/k), per .4 kg ABW for energy and 56.8 kg IBW for protein.    MONITORING AND EVALUATION:  Progress towards  goals will be monitored and evaluated per protocol and Practice Guidelines     Manuel Talbot RD

## 2023-10-28 NOTE — PROGRESS NOTES
Critical Care Progress Note      10/28/2023    Name: Mya Hui MRN#: [id number]   Age: 83 year old YOB: 1940     Hsptl Day# 3  ICU DAY #    MV DAY #             Problem List:   Principal Problem:    S/P lumbar spinal fusion  Active Problems:    Acute kidney failure with tubular necrosis (H)    S/P lumbar spinal fusion, from T 11 to pelvis with prior radicular symptoms and scoliosis,  complicated by hemorrhagic shock.    Hemorrhagic shock - Improving slowly and now titrated off of vasopressors and slowly improving.    Possible MI- with ST changes noted during surgery. Troponin bump minimal and attribute this to stress.     Acute respiratory failure - on 30%, +5 PEEP; Acidemia continues to improve slowly. We will not extubate until CNS improved  Acute renal failure creatinine up to 6.02 and UO increasing after she was off vasopressors. Lasix infusion started today and titrated up to 20 mgms/hr and UO improving. Discussed with renal and continue to monitor progress. She may need HD in the next day or two.    CNS- metabolic encephalopathy- still largely obtunded; heat CT negative and she remains largely off of all sedatives and pain meds, and will continue to monitor.   HTN  DM - IV Insulin infusion   Dyslipidemia   Obesity  History of hypothyroid and normal TSH- continue routine synthroid dose   History of CKD - baseline creatinine 1.1  History of lower extremity edema  GERD and history sliding hiatal hernia- PPI  History of cataracts   Nutrition- start enteral today.          Summary/Hospital Course:   82 y/o female s/p lumbar fusion T11 to pelvis presents to the ICU from PACU in hemorrhagic shock after EBL of 3.5 L. Received 6 units pRBCs (7 with today's unit) and 4 FFP; 10 units cryo; and 1 unit platelets in ICU.  Multiorgan failure with creatinine rise to 6, elevated transaminases, troponin elevation with non-specific ST-T wave changes most concerning for demand ischemia and decreased mental  status.         Assessment and plan :     Mya Hui IS a 83 year old female admitted on 10/25/2023 for hemorrhagic shock and multiorgan failure.   I have personally reviewed the daily labs, imaging studies, cultures and discussed the case with referring physician and consulting physicians.     My assessment and plan by system for this patient is as follows:    Neurology/Psychiatry:   1. Holding sedation medications. Patient remains encephalopathic, likely metabolic. Head CT without acute abnormalities.   2. Analgesia - oxycodone.  3. Anxiety  Plan  - Consider MRI if mental status not improving.      Cardiovascular:   1.Hemodynamics -Improving. She has been off of vasopressors since yesterday.   2.Rhythm - regular.  3. Ischemia - demand ischemia, improving.   Plan  - resume home diltiazem for antihypertensive regimen.    - Continue monitoring hemodynamics and adjusting pressors as required.   - Maintenance IVF.  - if hgb < 8.0 will transfuse pRBCs    Pulmonary/Ventilator Management:   1. Airway intubated.   2. Patient with severe metabolic acidosis being hyperventilated.   FiO2 30  RR 20    PEEP 5     Plan  - monitor ABG  - adjust RR as indicated    GI and Nutrition :   Start Tube feeds today given now off vasopressors.  Plan:  Diet consult    Renal/Fluids/Electrolytes:   1. ATN 2/2 hemorrhagic shock. Cr. 6. Nephrology consulted, appreciate their recs.   2. UOP slightly improving   Plan  - Nephrology following. Starting Lasix drip today per their recommendations.   - monitor function and electrolytes as needed with replacement per ICU protocols. - generally avoid nephrotoxic agents such as NSAID, IV contrast unless specifically required  - adjust medications as needed for renal clearance  - follow I/O's as appropriate.    Infectious Disease:   No active infection. No leukocytosis. Afebrile. Ancef given.   Plan  - Continue abx until drains removed.  - No active infectious concerns.     Endocrine:   IV  insulin protocol for stress induced hyperglycemia  Plan  - ICU insulin protocol, goal sugar <180    Hematology/Oncology:   1. Presented with hemorrhagic shock after spinal fusion procedure. EBL 3.5 L. Hgb of 5.6. Hgb improved to 10.3, now at 8.5.  Plan  - transfuse 1 unit pRBCs  - follow hgb; if < 8.0 will transfuse pRBCs     IV/Access:   1. Venous access - peripherals  2. Arterial access -  right radial; adjusted to today.   Plan  - central access required and necessary      ICU Prophylaxis:   1. DVT: Hep Subq/ LMWH/mechanical  2. VAP: HOB 30 degrees, chlorhexidine rinse  3. Stress Ulcer: PPI/H2 blocker  4. Restraints: Nonviolent soft two point restraints required and necessary for patient safety and continued cares and good effect as patient continues to pull at necessary lines, tubes despite education and distraction. Will readdress daily.   5. IV Access - central access required and necessary for continued patient cares  6. Feeding - nutrition tomorrow   7. Family Update: family not here today.   8. Disposition - ICU care.         Key goals for next 24 hours:   Lasix drip   2.   Monitor for changes and continue supportive ICU cares  3.   Start diet via tube feeds.                Interim History:   Creatinine continues to increase now at 6. Nephrology is following. Will start lasix drip today. Patient is having slight improvement in urine output now that she has been off of vasopressors for 24 hours. She remains encephalopathic. Acidemia is improving, now at 7.32           Key Medications:      [Held by provider] acetaminophen  975 mg Oral or NG Tube Q8H    ceFAZolin  1 g Intravenous Q24H    chlorhexidine  15 mL Mouth/Throat Q12H    furosemide  100 mg Intravenous Once    levothyroxine  100 mcg Oral or NG Tube QAM AC    pantoprazole  40 mg Per Feeding Tube QAM AC    Or    pantoprazole  40 mg Intravenous QAM AC    polyethylene glycol  17 g Oral or NG Tube Daily    senna-docusate  1 tablet Oral or NG Tube BID     sodium chloride (PF)  3 mL Intracatheter Q8H      dextrose      furosemide (LASIX) 100 mg in sodium chloride 0.9 % 100 mL infusion      HYDROmorphone Stopped (10/27/23 1048)    insulin regular 2 Units/hr (10/28/23 0531)    propofol Stopped (10/27/23 0801)    And    - MEDICATION INSTRUCTIONS -      norepinephrine Stopped (10/27/23 1937)    sodium chloride 20 mL/hr at 10/27/23 0800               Physical Examination:   Temp:  [97.8  F (36.6  C)-98.4  F (36.9  C)] 98.2  F (36.8  C)  Pulse:  [] 93  Resp:  [11-21] 20  BP: (114-144)/(48-83) 122/83  MAP:  [59 mmHg-219 mmHg] 97 mmHg  Arterial Line BP: ()/(2-227) 160/63  FiO2 (%):  [45 %] 45 %  SpO2:  [95 %-100 %] 95 %    Intake/Output Summary (Last 24 hours) at 10/28/2023 0800  Last data filed at 10/28/2023 0600  Gross per 24 hour   Intake 1165.05 ml   Output 703 ml   Net 462.05 ml     Wt Readings from Last 4 Encounters:   10/28/23 106.2 kg (234 lb 3.2 oz)   10/19/23 95 kg (209 lb 8 oz)   09/28/23 97.1 kg (214 lb)   09/14/23 97.1 kg (214 lb)     Arterial Line BP: ()/(2-227) 160/63  MAP:  [59 mmHg-219 mmHg] 97 mmHg  BP - Mean:  [70-92] 92  Vent Mode: CMV/AC  (Continuous Mandatory Ventilation/ Assist Control)  FiO2 (%): 45 %  Resp Rate (Set): 20 breaths/min  Tidal Volume (Set, mL): 500 mL  PEEP (cm H2O): 5 cmH2O  Resp: 20    Recent Labs   Lab 10/28/23  0425 10/27/23  2042 10/27/23  1753 10/27/23  1233   PH 7.34* 7.28* 7.31* 7.27*   PCO2 32* 36 35 37   PO2 100 110* 97 109*   HCO3 17* 17* 17* 17*   O2PER 40 45 45 45       GEN: no acute distress, comfortably resting in bed at 30 degrees on ventilator  HEENT: head ncat, sclera anicteric   PULM: unlabored synchronous with vent, mild rhonchi anteriorly    CV/COR: RRR   ABD: soft nontender,   EXT:  Diffuse mild edema   warm  NEURO: off all sedation; moves extremities to stimulation; Opens eyes to voice. Encephalopathic.   SKIN: no obvious rash; no cyanosis or mottling    LINES: clean, dry intact         Data:   All  data and imaging reviewed     ROUTINE ICU LABS (Last four results)  CMP  Recent Labs   Lab 10/28/23  0528 10/28/23  0431 10/28/23  0426 10/28/23  0224 10/27/23  2209 10/27/23  2018 10/27/23  0557 10/27/23  0556 10/26/23  0417 10/26/23  0414 10/25/23  2116 10/25/23  2008   NA  --   --  145  --   --  144  --  142  --  145  --  146*   POTASSIUM  --   --  4.8  --   --  4.8  --  4.7  --  4.5  --  4.1   CHLORIDE  --   --  112*  --   --  112*  --  113*  --  114*  --  115*   CO2  --   --  16*  --   --  16*  --  16*  --  14*  --  8*   ANIONGAP  --   --  17*  --   --  16*  --  13  --  17*  --  23*   * 96 131* 124*   < > 148*   < > 139*   < > 154*   < > 248*   BUN  --   --  54.6*  --   --  48.0*  --  39.8*  --  22.8  --  17.5   CR  --   --  6.02*  --   --  5.42*  --  4.41*  --  2.17*  --  1.40*   GFRESTIMATED  --   --  7*  --   --  7*  --  9*  --  22*  --  38*   TERESA  --   --  7.4*  --   --  7.1*  --  7.0*  --  7.7*  --  8.1*   MAG  --   --  2.3  --   --  2.2  --  2.2  --  2.7*   < >  --    PHOS  --   --  4.7*  --   --  4.5  --  4.2  --  3.8  --   --    PROTTOTAL  --   --  4.0*  --   --   --   --  3.8*  --  4.4*  --  4.6*   ALBUMIN  --   --  2.5*  --   --   --   --  2.5*  --  2.9*  --  3.2*   BILITOTAL  --   --  0.4  --   --   --   --  0.2  --  0.4  --  0.6   ALKPHOS  --   --  51  --   --   --   --  47  --  46  --  53   AST  --   --  1,913*  --   --   --   --  858*  --  468*  --  430*   ALT  --   --  171*  --   --   --   --  137*  --  234*  --  306*    < > = values in this interval not displayed.     CBC  Recent Labs   Lab 10/28/23  0426 10/27/23  2353 10/27/23  1753 10/27/23  1233 10/27/23  0556 10/26/23  1801 10/26/23  0958 10/26/23  0738   WBC 8.3  --   --   --  12.7*  --  15.9* 18.1*   RBC 2.91*  --   --   --  2.43*  --  2.93* 3.12*   HGB 8.5*  8.5* 8.6* 7.5* 7.1* 7.2*   < > 8.6* 9.5*   HCT 25.6*  --   --   --  21.8*  --  25.9* 27.4*   MCV 88  --   --   --  90  --  88 88   MCH 29.2  --   --   --  29.6  --  29.4 30.4  "  MCHC 33.2  --   --   --  33.0  --  33.2 34.7   RDW 17.4*  --   --   --  16.7*  --  15.8* 15.8*   PLT 52*  --   --   --  74*  --  77* 92*    < > = values in this interval not displayed.     INR  Recent Labs   Lab 10/27/23  1022 10/26/23  0414 10/25/23  2114 10/25/23  1629   INR 1.57* 1.58* 1.73* 2.11*     Arterial Blood Gas  Recent Labs   Lab 10/28/23  0425 10/27/23  2042 10/27/23  1753 10/27/23  1233   PH 7.34* 7.28* 7.31* 7.27*   PCO2 32* 36 35 37   PO2 100 110* 97 109*   HCO3 17* 17* 17* 17*   O2PER 40 45 45 45       All cultures:  No results for input(s): \"CULT\" in the last 168 hours.  Recent Results (from the past 24 hour(s))   CT Head w/o Contrast    Narrative    CT SCAN OF THE HEAD WITHOUT CONTRAST   10/27/2023 2:13 PM     HISTORY: recent hemorrhagic shock with multiorgan dysfunction. AMS    TECHNIQUE:  Axial images of the head and coronal reformations without  IV contrast material. Radiation dose for this scan was reduced using  automated exposure control, adjustment of the mA and/or kV according  to patient size, or iterative reconstruction technique.    COMPARISON: None.    FINDINGS: Moderate chronic small vessel ischemic changes. Mild global  cortical volume loss with expected dilatation of the ventricular  system. Intracranial atheromatous disease. Osseous calvarium is  intact.    The visualized portions of the sinuses and mastoids appear normal. The  bony calvarium and bones of the skull base appear intact.       Impression    IMPRESSION: No acute findings. Chronic changes.      YONATHAN SCHNEIDER MD         SYSTEM ID:  [id number]   US Abdomen Limited    Narrative    EXAM: US ABDOMEN LIMITED  LOCATION: [facility]  DATE: 10/27/2023    INDICATION: LIver enzymes elevated. Assess for billiary pathology.  COMPARISON: None.  TECHNIQUE: Limited abdominal ultrasound.    FINDINGS:    GALLBLADDER: Cholecystectomy. There is trace fluid in the gallbladder fossa.    BILE DUCTS: Common bile duct " "mildly prominent. The common duct measures 8 mm.    LIVER: Coarsened echotexture suggests parenchymal disease. No focal mass.    RIGHT KIDNEY: No hydronephrosis.    PANCREAS: The visualized portions are normal.    No ascites.      Impression    IMPRESSION:  1.  Cholecystectomy. Common bile duct prominent measuring 8 mm probably due to the postcholecystectomy state.    2.  Coarsening of the hepatic echotexture suggests parenchymal disease. Trace fluid in the gallbladder fossa.   XR Chest Port 1 View    Narrative    EXAM: XR CHEST PORT 1 VIEW  LOCATION: [facility]  DATE: 10/28/2023    INDICATION: CXR for ETT verification after advancing tube.  COMPARISON: 10/25/2023.    FINDINGS: ET tube 7 cm above the deena. Right IJ central venous catheter tip over the distal SVC. NG tube looped within a hiatal hernia. There is no pneumothorax. The heart size is normal. Increasing bibasilar infiltrates.      Impression    IMPRESSION: ET tube 7 cm above the deena.     Clinically Significant Risk Factors          # Hypocalcemia: Lowest iCa = 4 mg/dL in last 2 days, will monitor and replace as appropriate     # Hypoalbuminemia: Lowest albumin = 2.5 g/dL at 10/28/2023  4:26 AM, will monitor as appropriate  # Coagulation Defect: INR = 1.57 (Ref range: 0.85 - 1.15) and/or PTT = 31 Seconds (Ref range: 22 - 38 Seconds), will monitor for bleeding  # Thrombocytopenia: Lowest platelets = 52 in last 2 days, will monitor for bleeding  # Acute Kidney Injury, unspecified: based on a >150% or 0.3 mg/dL increase in last creatinine compared to past 90 day average, will monitor renal function  # Hypertension: Noted on problem list        # Obesity: Estimated body mass index is 38.97 kg/m  as calculated from the following:    Height as of this encounter: 1.651 m (5' 5\").    Weight as of this encounter: 106.2 kg (234 lb 3.2 oz)., PRESENT ON ADMISSION             ,    Vega Leonard MS4  MD Peace    Billing: This patient is " critically ill: Yes. Total critical care time today 40 min.    Attending Intensivist note  I fully assessed patient myself including interval history, physical exam, and review of all data. I also discussed case with Vega Leonard including bedside exam together. The assessment and plan and physical assessment are as noted and edited by me above.     Peace carbajal  October 28, 2023

## 2023-10-28 NOTE — PROGRESS NOTES
[facility] ICU RESPIRATORY NOTE           Date of Admission: 10/25/2023     Date of Intubation (most recent): 10/25/2023     Reason for Mechanical Ventilation: Airway Protection     Number of Days on Mechanical Ventilation: 4     Met Criteria for Spontaneous Breathing Trial: No     Reason for No Spontaneous Breathing Trial: Per MD     Significant Events Today: None.     ABG Results:      4:25 AM  (10/28/23) 1 d ago  (10/27/23) 1 d ago  (10/27/23)   pH Arterial  7.35 - 7.45 7.34 Low  7.28 Low    pCO2 Arterial  35 - 45 mm Hg 32 Low  36   pO2 Arterial  80 - 105 mm Hg 100 110 High    FIO2 40 45   Bicarbonate Arterial  21 - 28 mmol/L 17 Low  17 Low    Base Excess/Deficit  -9.0 - 1.8 mmol/L -7.8 -8.9       Current Vent Settings: Vent Mode: CMV/AC  (Continuous Mandatory Ventilation/ Assist Control)  FiO2 (%): 45 %  Resp Rate (Set): 20 breaths/min  Tidal Volume (Set, mL): 500 mL  PEEP (cm H2O): 5 cmH2O  Resp: 20        Skin Assessment: Intact     Plan: continue full vent support and assess  readiness for daily SBT.    Tavares Fernandes, RT on 10/28/2023 at 6:33 AM

## 2023-10-28 NOTE — PLAN OF CARE
Goal Outcome Evaluation:       Summary:  10/28 [id number]  Neuro: No significant changes during shift. Open eyes to speech, withdraws to pain on lower extremities. Minimal response to painful stimuli in LUE. No response to RUE. Left eye dysconjugate and deviated upward. R eye intermittently deviated upward.   Cardiac: SR. MAP>65.  Resp: Full vent support. No PST.  GI/: BS hypoactive, no BM. 100mg of Lasix given and drip started. Increased to 20mg/hr. Urine output 20-30mL/hr. Initiated TF.   Drains: Jpx2 - bright red bloody drainage. 2 PIV. Art line. Triple Lumen internal jugular.   Lab: No critical labs during shift.   Drips: Lasix @20mg/hr. Insulin titrated per protocol.

## 2023-10-28 NOTE — PROGRESS NOTES
ICU End of Shift Summary. See flowsheets for vital signs and detailed assessment.    Changes this shift: Levophed off at 1937.  FiO2 down from 45% to 30%.  Patient having very intermittent spotaneous movement of upper extremities to voice, moves lowers to stimulation. ETT repositioned by RT due to tube moving out.      Plan: Insulin mostly consistent overnight, paused for one hour, but otherwise at rate of 1 or 2 units per hour.  BG ranged from .

## 2023-10-28 NOTE — PROGRESS NOTES
[facility] ICU RESPIRATORY NOTE        Date of Admission: 10/25/2023    Date of Intubation (most recent): 10/25/2023    Reason for Mechanical Ventilation: Airway Protection     Number of Days on Mechanical Ventilation: 4    Met Criteria for Spontaneous Breathing Trial: No    Reason for No Spontaneous Breathing Trial: Per MD.     Significant Events Today: none.     ABG Results:   Recent Labs   Lab 10/28/23  0425 10/27/23  2042 10/27/23  1753 10/27/23  1233   PH 7.34* 7.28* 7.31* 7.27*   PCO2 32* 36 35 37   PO2 100 110* 97 109*   HCO3 17* 17* 17* 17*   O2PER 40 45 45 45         Current Vent Settings: Vent Mode: CMV/AC  (Continuous Mandatory Ventilation/ Assist Control)  FiO2 (%): 30 %  Resp Rate (Set): 20 breaths/min  Tidal Volume (Set, mL): 500 mL  PEEP (cm H2O): 5 cmH2O  Resp: 20      Skin Assessment: clean and dry.     Plan: Continue to wean from mechanical ventilation and oxygen as tolerated per protocol.

## 2023-10-29 ENCOUNTER — APPOINTMENT (OUTPATIENT)
Dept: GENERAL RADIOLOGY | Facility: CLINIC | Age: 83
DRG: 453 | End: 2023-10-29
Attending: STUDENT IN AN ORGANIZED HEALTH CARE EDUCATION/TRAINING PROGRAM
Payer: COMMERCIAL

## 2023-10-29 DIAGNOSIS — E78.5 HYPERLIPIDEMIA WITH TARGET LDL LESS THAN 100: ICD-10-CM

## 2023-10-29 LAB
ALBUMIN SERPL BCG-MCNC: 2.6 G/DL (ref 3.5–5.2)
ALP SERPL-CCNC: 66 U/L (ref 35–104)
ALT SERPL W P-5'-P-CCNC: 113 U/L (ref 0–50)
ANION GAP SERPL CALCULATED.3IONS-SCNC: 19 MMOL/L (ref 7–15)
AST SERPL W P-5'-P-CCNC: 741 U/L (ref 0–45)
BILIRUB DIRECT SERPL-MCNC: <0.2 MG/DL (ref 0–0.3)
BILIRUB SERPL-MCNC: 0.3 MG/DL
BUN SERPL-MCNC: 78.7 MG/DL (ref 8–23)
CALCIUM SERPL-MCNC: 7.3 MG/DL (ref 8.8–10.2)
CHLORIDE SERPL-SCNC: 112 MMOL/L (ref 98–107)
CREAT SERPL-MCNC: 7.34 MG/DL (ref 0.51–0.95)
DEPRECATED HCO3 PLAS-SCNC: 15 MMOL/L (ref 22–29)
EGFRCR SERPLBLD CKD-EPI 2021: 5 ML/MIN/1.73M2
ERYTHROCYTE [DISTWIDTH] IN BLOOD BY AUTOMATED COUNT: 17.4 % (ref 10–15)
GLUCOSE BLDC GLUCOMTR-MCNC: 104 MG/DL (ref 70–99)
GLUCOSE BLDC GLUCOMTR-MCNC: 113 MG/DL (ref 70–99)
GLUCOSE BLDC GLUCOMTR-MCNC: 116 MG/DL (ref 70–99)
GLUCOSE BLDC GLUCOMTR-MCNC: 116 MG/DL (ref 70–99)
GLUCOSE BLDC GLUCOMTR-MCNC: 119 MG/DL (ref 70–99)
GLUCOSE BLDC GLUCOMTR-MCNC: 121 MG/DL (ref 70–99)
GLUCOSE BLDC GLUCOMTR-MCNC: 121 MG/DL (ref 70–99)
GLUCOSE BLDC GLUCOMTR-MCNC: 126 MG/DL (ref 70–99)
GLUCOSE BLDC GLUCOMTR-MCNC: 135 MG/DL (ref 70–99)
GLUCOSE BLDC GLUCOMTR-MCNC: 137 MG/DL (ref 70–99)
GLUCOSE SERPL-MCNC: 122 MG/DL (ref 70–99)
HCT VFR BLD AUTO: 25.4 % (ref 35–47)
HGB BLD-MCNC: 8.2 G/DL (ref 11.7–15.7)
MAGNESIUM SERPL-MCNC: 2.7 MG/DL (ref 1.7–2.3)
MCH RBC QN AUTO: 28.4 PG (ref 26.5–33)
MCHC RBC AUTO-ENTMCNC: 32.3 G/DL (ref 31.5–36.5)
MCV RBC AUTO: 88 FL (ref 78–100)
PHOSPHATE SERPL-MCNC: 5.9 MG/DL (ref 2.5–4.5)
PLATELET # BLD AUTO: 58 10E3/UL (ref 150–450)
POTASSIUM SERPL-SCNC: 4.8 MMOL/L (ref 3.4–5.3)
PROT SERPL-MCNC: 4.4 G/DL (ref 6.4–8.3)
RBC # BLD AUTO: 2.89 10E6/UL (ref 3.8–5.2)
SODIUM SERPL-SCNC: 146 MMOL/L (ref 135–145)
WBC # BLD AUTO: 7.4 10E3/UL (ref 4–11)

## 2023-10-29 PROCEDURE — C9113 INJ PANTOPRAZOLE SODIUM, VIA: HCPCS | Mod: JZ | Performed by: INTERNAL MEDICINE

## 2023-10-29 PROCEDURE — 82248 BILIRUBIN DIRECT: CPT | Performed by: INTERNAL MEDICINE

## 2023-10-29 PROCEDURE — 258N000003 HC RX IP 258 OP 636: Performed by: INTERNAL MEDICINE

## 2023-10-29 PROCEDURE — 250N000013 HC RX MED GY IP 250 OP 250 PS 637: Performed by: NURSE PRACTITIONER

## 2023-10-29 PROCEDURE — 80048 BASIC METABOLIC PNL TOTAL CA: CPT | Performed by: INTERNAL MEDICINE

## 2023-10-29 PROCEDURE — 250N000011 HC RX IP 250 OP 636: Mod: JZ | Performed by: NURSE PRACTITIONER

## 2023-10-29 PROCEDURE — 99232 SBSQ HOSP IP/OBS MODERATE 35: CPT | Performed by: INTERNAL MEDICINE

## 2023-10-29 PROCEDURE — 94003 VENT MGMT INPAT SUBQ DAY: CPT

## 2023-10-29 PROCEDURE — 85027 COMPLETE CBC AUTOMATED: CPT | Performed by: INTERNAL MEDICINE

## 2023-10-29 PROCEDURE — 83735 ASSAY OF MAGNESIUM: CPT | Performed by: INTERNAL MEDICINE

## 2023-10-29 PROCEDURE — 250N000011 HC RX IP 250 OP 636: Performed by: INTERNAL MEDICINE

## 2023-10-29 PROCEDURE — 84100 ASSAY OF PHOSPHORUS: CPT | Performed by: INTERNAL MEDICINE

## 2023-10-29 PROCEDURE — 250N000011 HC RX IP 250 OP 636: Mod: JZ | Performed by: STUDENT IN AN ORGANIZED HEALTH CARE EDUCATION/TRAINING PROGRAM

## 2023-10-29 PROCEDURE — 71045 X-RAY EXAM CHEST 1 VIEW: CPT

## 2023-10-29 PROCEDURE — 200N000001 HC R&B ICU

## 2023-10-29 PROCEDURE — 250N000013 HC RX MED GY IP 250 OP 250 PS 637: Performed by: INTERNAL MEDICINE

## 2023-10-29 PROCEDURE — 999N000157 HC STATISTIC RCP TIME EA 10 MIN

## 2023-10-29 PROCEDURE — 99291 CRITICAL CARE FIRST HOUR: CPT | Mod: 24 | Performed by: INTERNAL MEDICINE

## 2023-10-29 RX ORDER — NICOTINE POLACRILEX 4 MG
15-30 LOZENGE BUCCAL
Status: DISCONTINUED | OUTPATIENT
Start: 2023-10-29 | End: 2023-12-01 | Stop reason: HOSPADM

## 2023-10-29 RX ORDER — DEXTROSE MONOHYDRATE 25 G/50ML
25-50 INJECTION, SOLUTION INTRAVENOUS
Status: DISCONTINUED | OUTPATIENT
Start: 2023-10-29 | End: 2023-12-01 | Stop reason: HOSPADM

## 2023-10-29 RX ORDER — HYDRALAZINE HYDROCHLORIDE 10 MG/1
10 TABLET, FILM COATED ORAL EVERY 8 HOURS
Status: DISCONTINUED | OUTPATIENT
Start: 2023-10-29 | End: 2023-12-01 | Stop reason: HOSPADM

## 2023-10-29 RX ADMIN — DILTIAZEM HYDROCHLORIDE 60 MG: 60 TABLET, FILM COATED ORAL at 12:05

## 2023-10-29 RX ADMIN — ONDANSETRON 4 MG: 2 INJECTION INTRAMUSCULAR; INTRAVENOUS at 14:50

## 2023-10-29 RX ADMIN — HYDRALAZINE HYDROCHLORIDE 10 MG: 10 TABLET ORAL at 14:16

## 2023-10-29 RX ADMIN — DILTIAZEM HYDROCHLORIDE 60 MG: 60 TABLET, FILM COATED ORAL at 05:02

## 2023-10-29 RX ADMIN — OXYCODONE HYDROCHLORIDE 2.5 MG: 5 TABLET ORAL at 01:08

## 2023-10-29 RX ADMIN — PROPOFOL 5 MCG/KG/MIN: 10 INJECTION, EMULSION INTRAVENOUS at 23:40

## 2023-10-29 RX ADMIN — PANTOPRAZOLE SODIUM 40 MG: 40 INJECTION, POWDER, FOR SOLUTION INTRAVENOUS at 06:32

## 2023-10-29 RX ADMIN — HYDRALAZINE HYDROCHLORIDE 10 MG: 20 INJECTION INTRAMUSCULAR; INTRAVENOUS at 04:47

## 2023-10-29 RX ADMIN — DILTIAZEM HYDROCHLORIDE 60 MG: 60 TABLET, FILM COATED ORAL at 18:13

## 2023-10-29 RX ADMIN — HYDRALAZINE HYDROCHLORIDE 10 MG: 10 TABLET ORAL at 22:08

## 2023-10-29 RX ADMIN — SENNOSIDES AND DOCUSATE SODIUM 1 TABLET: 8.6; 5 TABLET ORAL at 20:21

## 2023-10-29 RX ADMIN — FUROSEMIDE 20 MG/HR: 10 INJECTION, SOLUTION INTRAMUSCULAR; INTRAVENOUS at 08:13

## 2023-10-29 RX ADMIN — POLYETHYLENE GLYCOL 3350 17 G: 17 POWDER, FOR SOLUTION ORAL at 08:34

## 2023-10-29 RX ADMIN — FUROSEMIDE 20 MG/HR: 10 INJECTION, SOLUTION INTRAMUSCULAR; INTRAVENOUS at 12:06

## 2023-10-29 RX ADMIN — LEVOTHYROXINE SODIUM 100 MCG: 100 TABLET ORAL at 08:34

## 2023-10-29 RX ADMIN — CEFAZOLIN 1 G: 1 INJECTION, POWDER, FOR SOLUTION INTRAMUSCULAR; INTRAVENOUS at 19:33

## 2023-10-29 RX ADMIN — SENNOSIDES AND DOCUSATE SODIUM 1 TABLET: 8.6; 5 TABLET ORAL at 08:34

## 2023-10-29 RX ADMIN — CHLORHEXIDINE GLUCONATE 15 ML: 1.2 RINSE ORAL at 08:33

## 2023-10-29 RX ADMIN — CHLORHEXIDINE GLUCONATE 15 ML: 1.2 RINSE ORAL at 19:29

## 2023-10-29 RX ADMIN — OXYCODONE HYDROCHLORIDE 2.5 MG: 5 TABLET ORAL at 20:21

## 2023-10-29 RX ADMIN — FUROSEMIDE 20 MG/HR: 10 INJECTION, SOLUTION INTRAMUSCULAR; INTRAVENOUS at 19:32

## 2023-10-29 RX ADMIN — DILTIAZEM HYDROCHLORIDE 60 MG: 60 TABLET, FILM COATED ORAL at 23:37

## 2023-10-29 RX ADMIN — FUROSEMIDE 20 MG/HR: 10 INJECTION, SOLUTION INTRAMUSCULAR; INTRAVENOUS at 02:25

## 2023-10-29 ASSESSMENT — ACTIVITIES OF DAILY LIVING (ADL)
ADLS_ACUITY_SCORE: 40
ADLS_ACUITY_SCORE: 32
ADLS_ACUITY_SCORE: 40
ADLS_ACUITY_SCORE: 36
ADLS_ACUITY_SCORE: 40
ADLS_ACUITY_SCORE: 36
ADLS_ACUITY_SCORE: 36
ADLS_ACUITY_SCORE: 32
ADLS_ACUITY_SCORE: 36

## 2023-10-29 NOTE — PROGRESS NOTES
[facility] ICU RESPIRATORY NOTE        Date of Admission: 10/25/2023    Date of Intubation (most recent):  10/25/23    Reason for Mechanical Ventilation: AW protection    Number of Days on Mechanical Ventilation: 5    Met Criteria for Spontaneous Breathing Trial: No    Reason for No Spontaneous Breathing Trial: Per MD    Significant Events Today: None    ABG Results:   Recent Labs   Lab 10/28/23  0425 10/27/23  2042 10/27/23  1753 10/27/23  1233   PH 7.34* 7.28* 7.31* 7.27*   PCO2 32* 36 35 37   PO2 100 110* 97 109*   HCO3 17* 17* 17* 17*   O2PER 40 45 45 45         Current Vent Settings: Vent Mode: CMV/AC  (Continuous Mandatory Ventilation/ Assist Control)  FiO2 (%): 30 %  Resp Rate (Set): 20 breaths/min  Tidal Volume (Set, mL): 500 mL  PEEP (cm H2O): 5 cmH2O  Resp: 20      Skin Assessment: Intact    Plan: Pt to remain on full vent support overnight    Walter Cabral, RT

## 2023-10-29 NOTE — PROGRESS NOTES
Critical Care Progress Note      10/29/2023    Name: Mya Hui MRN#: [id number]   Age: 83 year old YOB: 1940     Hsptl Day# 4  ICU DAY #    MV DAY #             Problem List:   Principal Problem:    S/P lumbar spinal fusion  Active Problems:    Acute kidney failure with tubular necrosis (H)    S/P lumbar spinal fusion, from T 11 to pelvis with prior radicular symptoms and scoliosis,  complicated by hemorrhagic shock.    Hemorrhagic shock - Improving slowly and she remains off vasopressors and slowly improving.    Possible MI- with ST changes noted during surgery. Troponin bump minimal and attribute this to stress.     Acute respiratory failure - on 30%, +5 PEEP; Acidemia improved\. We will not extubate until CNS improved  Acute renal failure creatinine up to 7.34 and UO continues to increase iwth Lasix infusion started yesterday an is now at 20.  Discussed with renal and continue to monitor progress. We will plan for tunnel HD cath tomorrow.     CNS- metabolic encephalopathy- still largely obtunded; heat CT negative and she remains largely off of all sedatives and pain meds; continue to monitor.   HTN  DM - transition to subcutaneous regimen  Dyslipidemia   Obesity  History of hypothyroid and normal TSH- continue routine synthroid dose   History of CKD - baseline creatinine 1.1  History of lower extremity edema  GERD and history sliding hiatal hernia- PPI  History of cataracts   Nutrition- start enteral today.     I had a long talk with daughter at bedside today. Patient used a walker before admission but was a fully functional and active person before coming to hospital this admission.            Summary/Hospital Course:           Assessment and plan :     Mya Hui IS a 83 year old female admitted on 10/25/2023 for hemorrhagic shock.   I have personally reviewed the daily labs, imaging studies, cultures and discussed the case with referring physician and consulting physicians.     My  assessment and plan by system for this patient is as follows:    Neurology/Psychiatry:   1. As above, still not wakeful yet; she continues off most meds    Cardiovascular:   1.Hemodynamics - compensated off hemodynamic support     Pulmonary/Ventilator Management:   1. On 30% and +5 PEEP    GI and Nutrition :   1. Increasing enteral nutrition only sloydl    Renal/Fluids/Electrolytes:   1. Renal function continues to deteriorate but making urine with IV Lasix infusion is somewhat encouraging.      Infectious Disease:   1. No significant issues     Endocrine:   1. Glucose coverage adjusted today as above     Hematology/Oncology:   1. Counts ok      IV/Access:   1. Venous access -   2. Arterial access -   3.  Plan  - central access required and necessary      ICU Prophylaxis:   1. DVT: Hep Subq/ LMWH/mechanical  2. VAP: HOB 30 degrees, chlorhexidine rinse  3. Stress Ulcer: PPI/H2 blocker  4. Restraints: Nonviolent soft two point restraints required and necessary for patient safety and continued cares and good effect as patient continues to pull at necessary lines, tubes despite education and distraction. Will readdress daily.   5. IV Access - central access required and necessary for continued patient cares  6. Feeding - enteral   7. Family Update: discussed with daughter at bedside, as above  8. Disposition - as above, ICU care         Key goals for next 24 hours:   1.  2.  3.               Interim History:              Key Medications:      ceFAZolin  1 g Intravenous Q24H    chlorhexidine  15 mL Mouth/Throat Q12H    diltiazem  60 mg Oral or Feeding Tube Q6H NATACHA    hydrALAZINE  10 mg Oral or Feeding Tube Q8H    insulin aspart  1-12 Units Subcutaneous Q4H    levothyroxine  100 mcg Oral or NG Tube QAM AC    pantoprazole  40 mg Per Feeding Tube QAM AC    Or    pantoprazole  40 mg Intravenous QAM AC    polyethylene glycol  17 g Oral or NG Tube Daily    senna-docusate  1 tablet Oral or NG Tube BID    sodium chloride (PF)  3  mL Intracatheter Q8H      dextrose      dextrose      furosemide (LASIX) 100 mg in sodium chloride 0.9 % 100 mL infusion 20 mg/hr (10/29/23 1206)    HYDROmorphone Stopped (10/27/23 1048)    propofol Stopped (10/27/23 0801)    And    - MEDICATION INSTRUCTIONS -      norepinephrine Stopped (10/27/23 1937)    sodium chloride 10 mL/hr at 10/28/23 2015               Physical Examination:   Temp:  [97  F (36.1  C)-98.9  F (37.2  C)] 98.8  F (37.1  C)  Pulse:  [69-94] 82  Resp:  [7-38] 20  BP: (157-174)/(63-76) 174/67  MAP:  [66 mmHg-165 mmHg] 91 mmHg  Arterial Line BP: ()/(-) 153/59  FiO2 (%):  [30 %] 30 %  SpO2:  [92 %-99 %] 97 %    Intake/Output Summary (Last 24 hours) at 10/29/2023 1447  Last data filed at 10/29/2023 1400  Gross per 24 hour   Intake 1770 ml   Output 1364 ml   Net 406 ml     Wt Readings from Last 4 Encounters:   10/29/23 111.4 kg (245 lb 11.2 oz)   10/19/23 95 kg (209 lb 8 oz)   09/28/23 97.1 kg (214 lb)   09/14/23 97.1 kg (214 lb)     Arterial Line BP: ()/(-) 153/59  MAP:  [66 mmHg-165 mmHg] 91 mmHg  BP - Mean:  [87] 87  Vent Mode: CMV/AC  (Continuous Mandatory Ventilation/ Assist Control)  FiO2 (%): 30 %  Resp Rate (Set): 20 breaths/min  Tidal Volume (Set, mL): 500 mL  PEEP (cm H2O): 5 cmH2O  Resp: 20    Recent Labs   Lab 10/28/23  0425 10/27/23  2042 10/27/23  1753 10/27/23  1233   PH 7.34* 7.28* 7.31* 7.27*   PCO2 32* 36 35 37   PO2 100 110* 97 109*   HCO3 17* 17* 17* 17*   O2PER 40 45 45 45       GEN: no acute distress; comfortable on vent    HEENT: head ncat, sclera anicteric, OP patent, trachea midline   PULM: unlabored synchronous with vent, clear anteriorly    CV/COR: RRR S1S2 no gallop,  No rub, no murmur  ABD: soft nontender,   EXT:  minimal edema   warm  NEURO: grossly intact  SKIN: no obvious rash; no mottling  LINES: clean, dry intact         Data:   All data and imaging reviewed     ROUTINE ICU LABS (Last four results)  CMP  Recent Labs   Lab 10/29/23  1223  10/29/23  1011 10/29/23  0832 10/29/23  0612 10/29/23  0414 10/28/23  0431 10/28/23  0426 10/27/23  2209 10/27/23  2018 10/27/23  0557 10/27/23  0556 10/26/23  0417 10/26/23  0414   NA  --   --   --   --  146*  --  145  --  144  --  142  --  145   POTASSIUM  --   --   --   --  4.8  --  4.8  --  4.8  --  4.7  --  4.5   CHLORIDE  --   --   --   --  112*  --  112*  --  112*  --  113*  --  114*   CO2  --   --   --   --  15*  --  16*  --  16*  --  16*  --  14*   ANIONGAP  --   --   --   --  19*  --  17*  --  16*  --  13  --  17*   * 121* 104* 116* 122*   < > 131*   < > 148*   < > 139*   < > 154*   BUN  --   --   --   --  78.7*  --  54.6*  --  48.0*  --  39.8*  --  22.8   CR  --   --   --   --  7.34*  --  6.02*  --  5.42*  --  4.41*  --  2.17*   GFRESTIMATED  --   --   --   --  5*  --  7*  --  7*  --  9*  --  22*   TERESA  --   --   --   --  7.3*  --  7.4*  --  7.1*  --  7.0*  --  7.7*   MAG  --   --   --   --  2.7*  --  2.3  --  2.2  --  2.2  --  2.7*   PHOS  --   --   --   --  5.9*  --  4.7*  --  4.5  --  4.2  --  3.8   PROTTOTAL  --   --   --   --  4.4*  --  4.0*  --   --   --  3.8*  --  4.4*   ALBUMIN  --   --   --   --  2.6*  --  2.5*  --   --   --  2.5*  --  2.9*   BILITOTAL  --   --   --   --  0.3  --  0.4  --   --   --  0.2  --  0.4   ALKPHOS  --   --   --   --  66  --  51  --   --   --  47  --  46   AST  --   --   --   --  741*  --  1,913*  --   --   --  858*  --  468*   ALT  --   --   --   --  113*  --  171*  --   --   --  137*  --  234*    < > = values in this interval not displayed.     CBC  Recent Labs   Lab 10/29/23  0414 10/28/23  1810 10/28/23  1218 10/28/23  0426 10/27/23  1233 10/27/23  0556 10/26/23  1801 10/26/23  0958   WBC 7.4  --   --  8.3  --  12.7*  --  15.9*   RBC 2.89*  --   --  2.91*  --  2.43*  --  2.93*   HGB 8.2* 8.3* 8.4* 8.5*  8.5*   < > 7.2*   < > 8.6*   HCT 25.4*  --   --  25.6*  --  21.8*  --  25.9*   MCV 88  --   --  88  --  90  --  88   MCH 28.4  --   --  29.2  --  29.6  --  29.4  "  MCHC 32.3  --   --  33.2  --  33.0  --  33.2   RDW 17.4*  --   --  17.4*  --  16.7*  --  15.8*   PLT 58*  --   --  52*  --  74*  --  77*    < > = values in this interval not displayed.     INR  Recent Labs   Lab 10/27/23  1022 10/26/23  0414 10/25/23  2114 10/25/23  1629   INR 1.57* 1.58* 1.73* 2.11*     Arterial Blood Gas  Recent Labs   Lab 10/28/23  0425 10/27/23  2042 10/27/23  1753 10/27/23  1233   PH 7.34* 7.28* 7.31* 7.27*   PCO2 32* 36 35 37   PO2 100 110* 97 109*   HCO3 17* 17* 17* 17*   O2PER 40 45 45 45       All cultures:  No results for input(s): \"CULT\" in the last 168 hours.  No results found for this or any previous visit (from the past 24 hour(s)).    MD Peace    Billing: This patient is critically ill: Yes. Total critical care time today 50 min.            "

## 2023-10-29 NOTE — PROGRESS NOTES
Nephrology progress note    ASSESSMENT RECOMMENDATION:    LISA:    This is related to her septic shock as noted previously.    She does not have evidence of recovery of her kidney function at this point.  It appears to be dense ATN.    I discussed with Dr. Simon.  I also called the patient's daughter Valeri about Mya's situation.  I told her that I do think the kidney function will likely return given an overa ll recovery takes place.  We also discussed at there is always a risk the kidney function will not improve which point one would require chronic dialysis.  Olivia felt that the patient would be okay with getting renal replacement therapy.    I do think that we are approaching the point where dialysis ought to be initiated although there is no urgent indication.    I am going to order an IR tunneled catheter for tomorrow and orders  for acute dialysis.      NARRATIVE:    Mya remains critically ill requiring mechanical ventilation but not maximal support measures.    She has been maintained on a furosemide infusion since yesterday without much change in the urine output.  She is only made about 100 cc of urine a day over the past 3 days.    She remains off vasopressors.    Creatinine continues to rise and is up to 7.3 today with BUN of 79.   Total CO2 is low at 15.  Sodium increasing at 146.    On exam she is intubated and sedated.  She is attending a little to face and eyes are open.  There are spontaneous movements.    The breathing is synchronous with the vent and not labored.  Heart rate is regular.  Abdomen is not tender.    There is mild anasarca.

## 2023-10-29 NOTE — PROGRESS NOTES
Pt remains intubated in ICU. Not tracking, not following commands. Moves LUE/LLE/RLE spontaneously, minimal response to pain in RUE. SR. Scheduled Hydralazine ordered. 1 episode of emesis- plan to keep TF at current rate, advance tomorrow. PRN Zofran given x1. 50-60mL/hr UOP. Insulin gtt stopped- sliding scale start. Continue to monitor. Plan for tunneled line tomorrow- NPO at 0000.

## 2023-10-29 NOTE — PLAN OF CARE
Reason for Admission: Hemorrhagic shock     Neuro: Opens eyes to voice, BLE withdraw to pain, spontaneous movement of LUE this AM, L pupil dysconjugate/deviated upward, pupils sluggish, somnolent    RASS Goal: -1 to -2  Hemodynamic Goal: MAP >65, SBP <180  Cardio: Hypertensive, short run of VT overnight  Tele: SR with PVC    GI/: Voiding with Spicer catheter, minimal/adequate UOP.  Pulmonary: LS clear, minimal secretions.   Skin: ELY drain incisions, back incision, scattered bruising, BR on buttocks, redness under breast folds and groin  Pain: Oxycodone given once     Drains/Lines: R/L PIV, R IJ CVC, R arterial line  Infusions: lasix, insulin   Activity: BR  Diet: NPO, TF running through OG @ 30 ml/hr FWF 30 ml Q4H     Precautions: NA    End of shift summary: Insulin titrated per protocol. Hypertensive, given hydralazine once. TF advanced @ 0330.     Plans/Procedures: Plan for possible MRI

## 2023-10-29 NOTE — PROGRESS NOTES
[facility] ICU RESPIRATORY NOTE           Date of Admission: 10/25/2023     Date of Intubation (most recent): 10/25/2023     Reason for Mechanical Ventilation: Airway Protection      Met Criteria for Spontaneous Breathing Trial: No     Reason for No Spontaneous Breathing Trial: Per MD.      Significant Events Today: none.     Recent Labs   Lab 10/28/23  0425 10/27/23  2042 10/27/23  1753 10/27/23  1233   PH 7.34* 7.28* 7.31* 7.27*   PCO2 32* 36 35 37   PO2 100 110* 97 109*   HCO3 17* 17* 17* 17*   O2PER 40 45 45 45      Vent Mode: CMV/AC  (Continuous Mandatory Ventilation/ Assist Control)  FiO2 (%): 30 %  Resp Rate (Set): 20 breaths/min  Tidal Volume (Set, mL): 500 mL  PEEP (cm H2O): 5 cmH2O  Resp: 20      med [facility] RT

## 2023-10-29 NOTE — PROGRESS NOTES
[facility]    Neurosurgery  Daily Note    Assessment & Plan   Procedure(s):  Thoracic 11 to pelvis robotic decompression and fusion   4 Days Post-Op  Intubated without sedation  ELY right 40ml overnight, left 50ml overnight    Dressing in place and CDI   Nurse notes LUE spontaneous movement. No LUE/RUE response to painful stimulus, but has BLE withdrawal to pain.  Pupils sluggish but reactive  Opens eyes to voice and sternal rub    Hgb 8.2  Hematocrit 25.4  Plt 58    Plan:  -Defer medical cares to intensivist   -Pain management as needed  -Routine wound care  -Continue drains for now  -Continue antibiotics for now    MYRON GAITAN PA-C    Interval History     Physical Exam   Temp: 98.8  F (37.1  C) Temp src: Axillary BP: (!) 174/67 Pulse: 72   Resp: 20 SpO2: 96 % O2 Device: Mechanical Ventilator    Vitals:    10/27/23 0200 10/28/23 0600 10/29/23 0502   Weight: 103.7 kg (228 lb 11.2 oz) 106.2 kg (234 lb 3.2 oz) 111.4 kg (245 lb 11.2 oz)     Vital Signs with Ranges  Temp:  [97  F (36.1  C)-98.9  F (37.2  C)] 98.8  F (37.1  C)  Pulse:  [69-94] 72  Resp:  [7-38] 20  BP: (157-174)/(63-76) 174/67  MAP:  [66 mmHg-165 mmHg] 81 mmHg  Arterial Line BP: ()/(-) 150/53  FiO2 (%):  [30 %] 30 %  SpO2:  [92 %-99 %] 96 %  I/O last 3 completed shifts:  In: 1496.64 [I.V.:1001.64; NG/GT:315]  Out: 1236 [Urine:892; Drains:344]      Medications    dextrose      dextrose      furosemide (LASIX) 100 mg in sodium chloride 0.9 % 100 mL infusion 20 mg/hr (10/29/23 1206)    HYDROmorphone Stopped (10/27/23 1048)    propofol Stopped (10/27/23 0801)    And    - MEDICATION INSTRUCTIONS -      norepinephrine Stopped (10/27/23 1937)    sodium chloride 10 mL/hr at 10/28/23 2015       ceFAZolin  1 g Intravenous Q24H    chlorhexidine  15 mL Mouth/Throat Q12H    diltiazem  60 mg Oral or Feeding Tube Q6H [facility]    insulin aspart  1-12 Units Subcutaneous Q4H    levothyroxine  100 mcg Oral or NG Tube QAM AC     pantoprazole  40 mg Per Feeding Tube QAM AC    Or    pantoprazole  40 mg Intravenous QAM AC    polyethylene glycol  17 g Oral or NG Tube Daily    senna-docusate  1 tablet Oral or NG Tube BID    sodium chloride (PF)  3 mL Intracatheter Q8H       Divya Johns PA-C  [facility] Neurosurgery  [facility]  [address]  [address]  [place] [address]

## 2023-10-30 ENCOUNTER — APPOINTMENT (OUTPATIENT)
Dept: INTERVENTIONAL RADIOLOGY/VASCULAR | Facility: CLINIC | Age: 83
DRG: 453 | End: 2023-10-30
Attending: PHYSICIAN ASSISTANT
Payer: COMMERCIAL

## 2023-10-30 LAB
ALBUMIN SERPL BCG-MCNC: 2.5 G/DL (ref 3.5–5.2)
ALLEN'S TEST: ABNORMAL
ALP SERPL-CCNC: 82 U/L (ref 35–104)
ALT SERPL W P-5'-P-CCNC: 73 U/L (ref 0–50)
ANION GAP SERPL CALCULATED.3IONS-SCNC: 21 MMOL/L (ref 7–15)
AST SERPL W P-5'-P-CCNC: 207 U/L (ref 0–45)
BASE EXCESS BLDA CALC-SCNC: -1.2 MMOL/L (ref -9–1.8)
BILIRUB DIRECT SERPL-MCNC: <0.2 MG/DL (ref 0–0.3)
BILIRUB SERPL-MCNC: 0.3 MG/DL
BUN SERPL-MCNC: 105.4 MG/DL (ref 8–23)
CALCIUM SERPL-MCNC: 7.4 MG/DL (ref 8.8–10.2)
CHLORIDE SERPL-SCNC: 108 MMOL/L (ref 98–107)
CREAT SERPL-MCNC: 8.3 MG/DL (ref 0.51–0.95)
DEPRECATED HCO3 PLAS-SCNC: 15 MMOL/L (ref 22–29)
EGFRCR SERPLBLD CKD-EPI 2021: 4 ML/MIN/1.73M2
ERYTHROCYTE [DISTWIDTH] IN BLOOD BY AUTOMATED COUNT: 17.5 % (ref 10–15)
GLUCOSE BLDC GLUCOMTR-MCNC: 112 MG/DL (ref 70–99)
GLUCOSE BLDC GLUCOMTR-MCNC: 118 MG/DL (ref 70–99)
GLUCOSE BLDC GLUCOMTR-MCNC: 125 MG/DL (ref 70–99)
GLUCOSE BLDC GLUCOMTR-MCNC: 131 MG/DL (ref 70–99)
GLUCOSE BLDC GLUCOMTR-MCNC: 133 MG/DL (ref 70–99)
GLUCOSE SERPL-MCNC: 141 MG/DL (ref 70–99)
HBV SURFACE AB SERPL IA-ACNC: 15.68 M[IU]/ML
HBV SURFACE AB SERPL IA-ACNC: REACTIVE M[IU]/ML
HBV SURFACE AG SERPL QL IA: NONREACTIVE
HCO3 BLD-SCNC: 21 MMOL/L (ref 21–28)
HCT VFR BLD AUTO: 24.4 % (ref 35–47)
HGB BLD-MCNC: 8 G/DL (ref 11.7–15.7)
MAGNESIUM SERPL-MCNC: 2.8 MG/DL (ref 1.7–2.3)
MCH RBC QN AUTO: 29.3 PG (ref 26.5–33)
MCHC RBC AUTO-ENTMCNC: 32.8 G/DL (ref 31.5–36.5)
MCV RBC AUTO: 89 FL (ref 78–100)
O2/TOTAL GAS SETTING VFR VENT: 30 %
PCO2 BLD: 27 MM HG (ref 35–45)
PH BLD: 7.51 [PH] (ref 7.35–7.45)
PHOSPHATE SERPL-MCNC: 6.6 MG/DL (ref 2.5–4.5)
PLATELET # BLD AUTO: 86 10E3/UL (ref 150–450)
PO2 BLD: 68 MM HG (ref 80–105)
POTASSIUM SERPL-SCNC: 5.1 MMOL/L (ref 3.4–5.3)
PROT SERPL-MCNC: 4.5 G/DL (ref 6.4–8.3)
RBC # BLD AUTO: 2.73 10E6/UL (ref 3.8–5.2)
SODIUM SERPL-SCNC: 144 MMOL/L (ref 135–145)
WBC # BLD AUTO: 8.6 10E3/UL (ref 4–11)

## 2023-10-30 PROCEDURE — 90935 HEMODIALYSIS ONE EVALUATION: CPT

## 2023-10-30 PROCEDURE — 85027 COMPLETE CBC AUTOMATED: CPT | Performed by: INTERNAL MEDICINE

## 2023-10-30 PROCEDURE — C1750 CATH, HEMODIALYSIS,LONG-TERM: HCPCS

## 2023-10-30 PROCEDURE — 99233 SBSQ HOSP IP/OBS HIGH 50: CPT | Performed by: STUDENT IN AN ORGANIZED HEALTH CARE EDUCATION/TRAINING PROGRAM

## 2023-10-30 PROCEDURE — 999N000009 HC STATISTIC AIRWAY CARE

## 2023-10-30 PROCEDURE — 250N000011 HC RX IP 250 OP 636: Performed by: STUDENT IN AN ORGANIZED HEALTH CARE EDUCATION/TRAINING PROGRAM

## 2023-10-30 PROCEDURE — 250N000013 HC RX MED GY IP 250 OP 250 PS 637: Performed by: NURSE PRACTITIONER

## 2023-10-30 PROCEDURE — 02H633Z INSERTION OF INFUSION DEVICE INTO RIGHT ATRIUM, PERCUTANEOUS APPROACH: ICD-10-PCS | Performed by: RADIOLOGY

## 2023-10-30 PROCEDURE — C1769 GUIDE WIRE: HCPCS

## 2023-10-30 PROCEDURE — 999N000157 HC STATISTIC RCP TIME EA 10 MIN

## 2023-10-30 PROCEDURE — 86706 HEP B SURFACE ANTIBODY: CPT | Performed by: STUDENT IN AN ORGANIZED HEALTH CARE EDUCATION/TRAINING PROGRAM

## 2023-10-30 PROCEDURE — 0JH63XZ INSERTION OF TUNNELED VASCULAR ACCESS DEVICE INTO CHEST SUBCUTANEOUS TISSUE AND FASCIA, PERCUTANEOUS APPROACH: ICD-10-PCS | Performed by: RADIOLOGY

## 2023-10-30 PROCEDURE — 94003 VENT MGMT INPAT SUBQ DAY: CPT

## 2023-10-30 PROCEDURE — 250N000009 HC RX 250: Performed by: INTERNAL MEDICINE

## 2023-10-30 PROCEDURE — 84100 ASSAY OF PHOSPHORUS: CPT | Performed by: INTERNAL MEDICINE

## 2023-10-30 PROCEDURE — 200N000001 HC R&B ICU

## 2023-10-30 PROCEDURE — 83735 ASSAY OF MAGNESIUM: CPT | Performed by: INTERNAL MEDICINE

## 2023-10-30 PROCEDURE — 82803 BLOOD GASES ANY COMBINATION: CPT | Performed by: INTERNAL MEDICINE

## 2023-10-30 PROCEDURE — 250N000011 HC RX IP 250 OP 636: Performed by: RADIOLOGY

## 2023-10-30 PROCEDURE — 258N000003 HC RX IP 258 OP 636: Performed by: INTERNAL MEDICINE

## 2023-10-30 PROCEDURE — 250N000011 HC RX IP 250 OP 636: Mod: JZ | Performed by: INTERNAL MEDICINE

## 2023-10-30 PROCEDURE — L0464 TLSO 4MOD SACRO-SCAP PRE: HCPCS

## 2023-10-30 PROCEDURE — 250N000011 HC RX IP 250 OP 636: Performed by: INTERNAL MEDICINE

## 2023-10-30 PROCEDURE — 87340 HEPATITIS B SURFACE AG IA: CPT | Performed by: STUDENT IN AN ORGANIZED HEALTH CARE EDUCATION/TRAINING PROGRAM

## 2023-10-30 PROCEDURE — P9047 ALBUMIN (HUMAN), 25%, 50ML: HCPCS | Performed by: STUDENT IN AN ORGANIZED HEALTH CARE EDUCATION/TRAINING PROGRAM

## 2023-10-30 PROCEDURE — 250N000009 HC RX 250: Performed by: RADIOLOGY

## 2023-10-30 PROCEDURE — 250N000013 HC RX MED GY IP 250 OP 250 PS 637: Performed by: INTERNAL MEDICINE

## 2023-10-30 PROCEDURE — 76937 US GUIDE VASCULAR ACCESS: CPT

## 2023-10-30 PROCEDURE — 99291 CRITICAL CARE FIRST HOUR: CPT | Performed by: INTERNAL MEDICINE

## 2023-10-30 PROCEDURE — 80053 COMPREHEN METABOLIC PANEL: CPT | Performed by: INTERNAL MEDICINE

## 2023-10-30 PROCEDURE — 999N000254 HC STATISTIC VENTILATOR TRANSFER

## 2023-10-30 PROCEDURE — 999N000253 HC STATISTIC WEANING TRIALS

## 2023-10-30 PROCEDURE — 272N000196 HC ACCESSORY CR5

## 2023-10-30 PROCEDURE — 5A1D70Z PERFORMANCE OF URINARY FILTRATION, INTERMITTENT, LESS THAN 6 HOURS PER DAY: ICD-10-PCS | Performed by: INTERNAL MEDICINE

## 2023-10-30 PROCEDURE — 87070 CULTURE OTHR SPECIMN AEROBIC: CPT | Performed by: INTERNAL MEDICINE

## 2023-10-30 RX ORDER — B COMPLEX C NO.10/FOLIC ACID 900MCG/5ML
5 LIQUID (ML) ORAL DAILY
Status: DISCONTINUED | OUTPATIENT
Start: 2023-10-30 | End: 2023-11-14

## 2023-10-30 RX ORDER — LIDOCAINE HYDROCHLORIDE 10 MG/ML
1-30 INJECTION, SOLUTION INFILTRATION; PERINEURAL
Status: COMPLETED | OUTPATIENT
Start: 2023-10-30 | End: 2023-10-30

## 2023-10-30 RX ORDER — HEPARIN SODIUM 1000 [USP'U]/ML
4000 INJECTION, SOLUTION INTRAVENOUS; SUBCUTANEOUS ONCE
Status: COMPLETED | OUTPATIENT
Start: 2023-10-30 | End: 2023-10-30

## 2023-10-30 RX ORDER — ALBUMIN (HUMAN) 12.5 G/50ML
12.5 SOLUTION INTRAVENOUS
Status: DISCONTINUED | OUTPATIENT
Start: 2023-10-30 | End: 2023-11-07

## 2023-10-30 RX ORDER — DEXMEDETOMIDINE HYDROCHLORIDE 4 UG/ML
.1-1.2 INJECTION, SOLUTION INTRAVENOUS CONTINUOUS
Status: DISCONTINUED | OUTPATIENT
Start: 2023-10-31 | End: 2023-11-01

## 2023-10-30 RX ORDER — FLUMAZENIL 0.1 MG/ML
0.2 INJECTION, SOLUTION INTRAVENOUS
Status: CANCELLED | OUTPATIENT
Start: 2023-10-30

## 2023-10-30 RX ORDER — CHOLESTYRAMINE 4 G/9G
POWDER, FOR SUSPENSION ORAL
Qty: 60 PACKET | Refills: 5 | Status: SHIPPED | OUTPATIENT
Start: 2023-10-30 | End: 2024-05-22

## 2023-10-30 RX ADMIN — SENNOSIDES AND DOCUSATE SODIUM 1 TABLET: 8.6; 5 TABLET ORAL at 08:12

## 2023-10-30 RX ADMIN — DILTIAZEM HYDROCHLORIDE 60 MG: 60 TABLET, FILM COATED ORAL at 06:32

## 2023-10-30 RX ADMIN — LIDOCAINE HYDROCHLORIDE 6 ML: 10 INJECTION, SOLUTION INFILTRATION; PERINEURAL at 11:33

## 2023-10-30 RX ADMIN — ALBUMIN HUMAN 12.5 G: 0.25 SOLUTION INTRAVENOUS at 13:27

## 2023-10-30 RX ADMIN — LEVOTHYROXINE SODIUM 100 MCG: 100 TABLET ORAL at 06:32

## 2023-10-30 RX ADMIN — DEXMEDETOMIDINE HYDROCHLORIDE 0.2 MCG/KG/HR: 400 INJECTION INTRAVENOUS at 23:56

## 2023-10-30 RX ADMIN — HEPARIN SODIUM 1600 UNITS: 1000 INJECTION INTRAVENOUS; SUBCUTANEOUS at 13:12

## 2023-10-30 RX ADMIN — CHLORHEXIDINE GLUCONATE 15 ML: 1.2 RINSE ORAL at 08:12

## 2023-10-30 RX ADMIN — HEPARIN SODIUM 1600 UNITS: 1000 INJECTION INTRAVENOUS; SUBCUTANEOUS at 13:13

## 2023-10-30 RX ADMIN — HYDRALAZINE HYDROCHLORIDE 10 MG: 10 TABLET ORAL at 06:31

## 2023-10-30 RX ADMIN — Medication 5 ML: at 16:42

## 2023-10-30 RX ADMIN — Medication: at 13:10

## 2023-10-30 RX ADMIN — CEFAZOLIN 1 G: 1 INJECTION, POWDER, FOR SOLUTION INTRAMUSCULAR; INTRAVENOUS at 20:36

## 2023-10-30 RX ADMIN — LIDOCAINE HYDROCHLORIDE 7 ML: 10; .005 INJECTION, SOLUTION EPIDURAL; INFILTRATION; INTRACAUDAL; PERINEURAL at 11:34

## 2023-10-30 RX ADMIN — FUROSEMIDE 20 MG/HR: 10 INJECTION, SOLUTION INTRAMUSCULAR; INTRAVENOUS at 22:43

## 2023-10-30 RX ADMIN — ALBUMIN HUMAN 12.5 G: 0.25 SOLUTION INTRAVENOUS at 14:37

## 2023-10-30 RX ADMIN — HYDRALAZINE HYDROCHLORIDE 10 MG: 10 TABLET ORAL at 22:20

## 2023-10-30 RX ADMIN — FUROSEMIDE 20 MG/HR: 10 INJECTION, SOLUTION INTRAMUSCULAR; INTRAVENOUS at 12:30

## 2023-10-30 RX ADMIN — FUROSEMIDE 20 MG/HR: 10 INJECTION, SOLUTION INTRAMUSCULAR; INTRAVENOUS at 01:24

## 2023-10-30 RX ADMIN — POLYETHYLENE GLYCOL 3350 17 G: 17 POWDER, FOR SOLUTION ORAL at 08:12

## 2023-10-30 RX ADMIN — FENTANYL CITRATE 12.5 MCG: 50 INJECTION, SOLUTION INTRAMUSCULAR; INTRAVENOUS at 19:43

## 2023-10-30 RX ADMIN — CHLORHEXIDINE GLUCONATE 15 ML: 1.2 RINSE ORAL at 20:37

## 2023-10-30 RX ADMIN — HEPARIN SODIUM 4000 UNITS: 1000 INJECTION INTRAVENOUS; SUBCUTANEOUS at 11:40

## 2023-10-30 RX ADMIN — SODIUM CHLORIDE 300 ML: 9 INJECTION, SOLUTION INTRAVENOUS at 12:10

## 2023-10-30 RX ADMIN — FUROSEMIDE 20 MG/HR: 10 INJECTION, SOLUTION INTRAMUSCULAR; INTRAVENOUS at 06:50

## 2023-10-30 RX ADMIN — HYDROXYZINE HYDROCHLORIDE 10 MG: 10 TABLET ORAL at 19:44

## 2023-10-30 RX ADMIN — SODIUM CHLORIDE 250 ML: 9 INJECTION, SOLUTION INTRAVENOUS at 13:11

## 2023-10-30 RX ADMIN — Medication 40 MG: at 06:31

## 2023-10-30 RX ADMIN — DILTIAZEM HYDROCHLORIDE 60 MG: 60 TABLET, FILM COATED ORAL at 12:56

## 2023-10-30 RX ADMIN — DILTIAZEM HYDROCHLORIDE 60 MG: 60 TABLET, FILM COATED ORAL at 18:29

## 2023-10-30 ASSESSMENT — ACTIVITIES OF DAILY LIVING (ADL)
ADLS_ACUITY_SCORE: 44
ADLS_ACUITY_SCORE: 40
ADLS_ACUITY_SCORE: 40
ADLS_ACUITY_SCORE: 44
ADLS_ACUITY_SCORE: 40
ADLS_ACUITY_SCORE: 40
ADLS_ACUITY_SCORE: 44
ADLS_ACUITY_SCORE: 44
ADLS_ACUITY_SCORE: 40

## 2023-10-30 NOTE — PROGRESS NOTES
S: Pt. seen in the [facility]. Female. Naldocayetano ISAAC. RX: TLSO brace. DX: Spinal surgery.  O:A Pt. is unable to walk at this time. Pt. is not conscience. PT wants to get her sitting. Today I F/D an [place] 464 TLSO to support surgical site. Donning doffing wear and care instructions given verbally and hard copy to Nurse and Daughter.   P: Pt. to be seen as needed.    Rashaad Price CO, LO

## 2023-10-30 NOTE — PROGRESS NOTES
[facility]    Neurosurgery  Daily Note    Assessment & Plan   Procedure(s):  Thoracic 11 to pelvis robotic decompression and fusion   5 Days Post-Op  Intubated-was on propofol for coughing  ELY right 107ml overnight, left 138ml last 24 hours  Dressing in place and CDI   Pupils sluggish but reactive  Opens eyes to sternal rub  Tripleflexes in BLE  0/5 in BUE    Plan:  -Defer medical cares to intensivist   -Minimize sedation as able to allow for most accurate exam. Consider Precedex gtt or PRN medications for vent compliance.   -Pain management as needed  -Routine wound care  -Continue drains for now  -Continue antibiotics for now    Dat Maldonado CNP    Interval History     Physical Exam   Temp: 97.8  F (36.6  C) Temp src: Oral BP: (!) 140/58 Pulse: 72   Resp: 22 SpO2: 94 % O2 Device: Mechanical Ventilator    Vitals:    10/28/23 0600 10/29/23 0502 10/30/23 0600   Weight: 106.2 kg (234 lb 3.2 oz) 111.4 kg (245 lb 11.2 oz) 107.8 kg (237 lb 9.6 oz)     Vital Signs with Ranges  Temp:  [97.5  F (36.4  C)-99  F (37.2  C)] 97.8  F (36.6  C)  Pulse:  [70-86] 72  Resp:  [0-26] 22  BP: (140-162)/(58-68) 140/58  MAP:  [61 mmHg-100 mmHg] 66 mmHg  Arterial Line BP: (102-163)/(38-69) 115/45  FiO2 (%):  [30 %] 30 %  SpO2:  [91 %-98 %] 94 %  I/O last 3 completed shifts:  In: 1631.43 [I.V.:856.43; NG/GT:295]  Out: 1400 [Urine:1155; Drains:245]      Medications    dextrose      dextrose      furosemide (LASIX) 100 mg in sodium chloride 0.9 % 100 mL infusion 20 mg/hr (10/30/23 1230)    HYDROmorphone Stopped (10/27/23 1048)    propofol Stopped (10/30/23 0830)    And    - MEDICATION INSTRUCTIONS -      norepinephrine Stopped (10/27/23 1937)    sodium chloride 10 mL/hr at 10/29/23 1924       - MEDICATION INSTRUCTIONS for Dialysis Patients -   Does not apply See Admin Instructions    B and C vitamin Complex with folic acid  5 mL Per Feeding Tube Daily    ceFAZolin  1 g Intravenous Q24H    chlorhexidine  15 mL  Mouth/Throat Q12H    diltiazem  60 mg Oral or Feeding Tube Q6H NATACHA    hydrALAZINE  10 mg Oral or Feeding Tube Q8H    insulin aspart  1-12 Units Subcutaneous Q4H    levothyroxine  100 mcg Oral or NG Tube QAM AC    pantoprazole  40 mg Per Feeding Tube QAM AC    Or    pantoprazole  40 mg Intravenous QAM AC    polyethylene glycol  17 g Oral or NG Tube Daily    protein modular  1 packet Per Feeding Tube Daily    senna-docusate  1 tablet Oral or NG Tube BID    sodium chloride (PF)  3 mL Intracatheter Q8H

## 2023-10-30 NOTE — PROGRESS NOTES
Potassium   Date Value Ref Range Status   10/30/2023 5.1 3.4 - 5.3 mmol/L Final   05/13/2022 4.5 3.4 - 5.3 mmol/L Final   01/06/2021 4.3 3.4 - 5.3 mmol/L Final     Potassium POCT   Date Value Ref Range Status   10/25/2023 4.7 3.4 - 5.3 mmol/L Final     Comment:     000     Hemoglobin   Date Value Ref Range Status   10/30/2023 8.0 (L) 11.7 - 15.7 g/dL Final   09/23/2020 13.1 11.7 - 15.7 g/dL Final     Creatinine   Date Value Ref Range Status   10/30/2023 8.30 (H) 0.51 - 0.95 mg/dL Final   01/06/2021 1.04 0.52 - 1.04 mg/dL Final     Urea Nitrogen   Date Value Ref Range Status   10/30/2023 105.4 (H) 8.0 - 23.0 mg/dL Final   05/13/2022 17 7 - 30 mg/dL Final   01/06/2021 21 7 - 30 mg/dL Final     Sodium   Date Value Ref Range Status   10/30/2023 144 135 - 145 mmol/L Final     Comment:     Reference intervals for this test were updated on 09/26/2023 to more accurately reflect our healthy population. There may be differences in the flagging of prior results with similar values performed with this method. Interpretation of those prior results can be made in the context of the updated reference intervals.    01/06/2021 144 133 - 144 mmol/L Final     INR   Date Value Ref Range Status   10/27/2023 1.57 (H) 0.85 - 1.15 Final       DIALYSIS PROCEDURE NOTE  Hepatitis status of previous patient on machine log was checked and verified ok to use with this patients hepatitis status.  Patient dialyzed for 3 hrs. on a K2 bath with a net fluid removal of 2L.  A BFR of 300 ml/min was obtained via a newly inserted tunnelled Right internal jugular CVC.      The treatment plan was discussed with Dr. Chacon during the treatment.    Total heparin received during the treatment: 0 units.      Line flushed, clamped and capped with heparin 1:1000 1.6 mL (1600 units) per lumen    Meds  given: 50mL 25% albuminx 2 doses   Complications: 25% albumin infused for BP support after rapid drop into mid 90s 30 minutes into treatment. IV lasix that  was running also held for remainder of treatment and UF for 10minutes. There was a repeated downward trend for BP at the last hour of treatment and second dose of albumin infused for BP support for remainder of treatment. No further incidence     Person educated: patient. Knowledge base unable to assess, mute from intubation and somnolent. Barriers to learning: level of consciousness severely reduced. Educated on procedure and fluid management via verbal mode.     ICEBOAT? Timeout performed pre-treatment  I: Patient was identified using 2 identifiers  C:  Consent Signed Yes  E: Equipment preventative maintenance is current and dialysis delivery system OK to use  B: Hepatitis B Surface Antigen: Unknown; Draw Date: 10/30/23      Hepatitis B Surface Antibody: Unknown; Draw Date: 10/30/23  O: Dialysis orders present and complete prior to treatment  A: Vascular access verified and assessed prior to treatment  T: Treatment was performed at a clinically appropriate time  ?: Patient was allowed to ask questions and address concerns prior to treatment  See Adult Hemodialysis flowsheet in [facility] for further details and post assessment.  Machine water alarm in place and functioning. Transducer pods intact and checked every 15min.   Pt dialyzed in own ICU room  Chlorine/Chloramine water system checked every 4 hours.  Outpatient Dialysis TBD    Patient repositioned every 2 hours during the treatment.  Post treatment report given to ABDI Garvin RN regarding 2L of fluid removed and last BP of 160/56

## 2023-10-30 NOTE — PLAN OF CARE
Goal Outcome Evaluation:       Summary:  10/30 [id number]  Neuro: Moves LUE/LLE/RLE spontaneously, minimal response to pain in RUE.    Cardiac: SR. MAP>65.   Resp: Full vent support. Currently on PST - tolerating well.   GI/: BS hypoactive, BM x2. On lasix drup at 20mg/hr. Urine output 40-50mL/hr. TF restarted at 30ml/hr- switched to Novasource Renal.   Drains: Jpx2 - bright red bloody drainage. 2 PIV. Art line. Triple Lumen internal jugular was removed in IR d/t CVC tunneled placement.    Lab: No critical labs during shift.   Drips: Lasix @20mg/hr.     Pt had HD today.

## 2023-10-30 NOTE — PROGRESS NOTES
CLINICAL NUTRITION SERVICES - REASSESSMENT NOTE      Recommendations Ordered by Registered Dietitian (RD): Change goal TF regimen to Novasource Renal at 40 mL/hr x 22 hours = 1760 kcal (17 kcal/kg), 80 g protein (1.4 g/kg), 161 g CHO, 0 g fiber, 631 mL H2O  Add ProSource TF20 once daily = 80 kcal and 20 g protein  Total = 1840 kcal (17 kcal/kg), 100 g protein (1.8 g/kg)  Nephronex daily     Malnutrition: % Weight Loss:  None noted  % Intake:  </= 50% for >/= 5 days (severe malnutrition)  Subcutaneous Fat Loss:  None observed (10/28)  Muscle Loss:  None observed (10/28)  Fluid Retention:  Moderate 3+ generalized     Malnutrition Diagnosis: Moderate malnutrition  In Context of:  Acute illness or injury       EVALUATION OF PROGRESS TOWARD GOALS   Diet:  NPO on vent     Nutrition Support:  TF off at MN for tunneled HD catheter placement.   TF orders were written on 10/28 by my colleague.  Patient was started on Promote at 10 mL/hr and had only advanced to 30 mL/hr as of MN last night d/t emesis on 10/29 (therefore TF was not advance).  Goal TF per EPIC ~      Nutrition Support Enteral:  Type of Feeding Tube: NG  Enteral Frequency:  Continuous  Enteral Regimen: Promote at 70 mL/hr x 22 hours (hold TF 1 hour before and 1 hour after Synthroid administration)  Total Enteral Provisions: 1540 kcal (15 kcal/kg), 95 g protein (1.7 g/kg), 200 g CHO, 0 g fiber, 1292 mL H2O  Free Water Flush: 30 mL every 4 hours     Intake/Tolerance:    Emesis yesterday as note above   K normal, Mg 2.8 (H), Phos 6.6 (H) - LISA with ATN, plan for HD initiation   .4 (H), Cr 8.3 (H) - LISA with ATN, plan for HD initiation   -137  I/O 1631/1400, wt 107.8 kg (up from admit) - Getting IV Lasix   No BM yet       ASSESSED NUTRITION NEEDS:  Dosing Weight 106.4 kg ABW for energy and 56.8 kg IBW for protein      Estimated Energy Needs: 3639-3406 kcals (14-17 Kcal/Kg)  Justification: obese and vented  Estimated Protein Needs: + grams protein  (1.5 - 2+ g pro/Kg)  Justification: obesity guidelines + HD planned       NEW FINDINGS:   10/28:  FT= Enteric tube within the stomach with hiatal hernia   10/30: Tunneled dialysis catheter placement     Propofol off today     Previous Goals (10/28):   Total average nutritional intake to meet a minimum of 1490 kcal (14 kcal/kg) and 85 g protein (1.5 g/k), per .4 kg ABW for energy and 56.8 kg IBW for protein.   Evaluation: Not met    Previous Nutrition Diagnosis (10/28):   Inadequate protein-energy intake related to NPO status as evidenced by need for nutrition support to meet nutritional needs.   Evaluation: No change      MALNUTRITION  % Weight Loss:  None noted  % Intake:  </= 50% for >/= 5 days (severe malnutrition)  Subcutaneous Fat Loss:  None observed (10/28)  Muscle Loss:  None observed (10/28)  Fluid Retention:  Moderate 3+ generalized     Malnutrition Diagnosis: Moderate malnutrition  In Context of:  Acute illness or injury    CURRENT NUTRITION DIAGNOSIS  Inadequate protein-energy intake related to NPO with plans to resume TF today as evidenced by meeting 0% protein and energy needs    INTERVENTIONS  Recommendations / Nutrition Prescription  Change goal TF regimen to Novasource Renal at 40 mL/hr x 22 hours = 1760 kcal (17 kcal/kg), 80 g protein (1.4 g/kg), 161 g CHO, 0 g fiber, 631 mL H2O  Add ProSource TF20 once daily = 80 kcal and 20 g protein  Total = 1840 kcal (17 kcal/kg), 100 g protein (1.8 g/kg)  Nephronex daily     Implementation  EN Composition, EN Schedule, Medical Food Supplement, Multivitamin/Mineral:  Orders written to change TF product to Novasource Renal and run at 30 mL/hr x 8 hours and then advance to goal.  ProSource TF20 and Nephronex as above.    Collaboration and Referral of Nutrition care:  Patient discussed today during interdisciplinary bedside rounds.     Goals  Goal TF regimen will meet % needs     MONITORING AND EVALUATION:  Progress towards goals will be monitored and  evaluated per protocol and Practice Guidelines    Sonia Sanchez RD, LD, CNSC   Clinical Dietitian - [facility]

## 2023-10-30 NOTE — PROGRESS NOTES
RT assist in the transport of the patient to IR. MD ordered vent settings on [place] transport vent, SpO2 remain appropriate. No issues to report    Dylan Martin, RRT

## 2023-10-30 NOTE — PROGRESS NOTES
Critical Care  Note      10/30/2023    Name: Mya Hui MRN#: [id number]   Age: 83 year old YOB: 1940     Hsptl Day# 5  ICU DAY #    MV DAY #             Problem List:   Principal Problem:    S/P lumbar spinal fusion  Active Problems:    Acute kidney failure with tubular necrosis (H)           Summary/Hospital Course:       Interval/overnight events:  -No acute events overnight.  - Currently on minimal vent settings with 30% FiO2 5 of PEEP with sats in the high 90s.  - Plan for tunneled HD catheter tunneled HD catheter placed today.  Plans for first hemodialysis session later today.      Assessment and plan :     Mya Hui IS a 83 year old female admitted on 10/25/2023 for hemorrhagic shock post lumbar spinal fusion surgery along with LISA and encephalopathy.     I have personally reviewed the daily labs, imaging studies, cultures and discussed the case with referring physician and consulting physicians.     My assessment and plan by system for this patient is as follows:    #S/p lumbar spinal fusion, from 1011 2 pelvis with prior radicular symptoms and scoliosis  For lumbar scoliosis and radiculopathy  #Hemorrhagic shock  Postprocedure.  Received multiple blood products, remains off vasopressors.  #Possible MI  ST changes noted intraoperatively. Minimal troponin elevation.  Echocardiogram did not show any significant abnormality.  #Acute hypoxic respiratory failure  Remains intubated postoperatively.  Current obstacle to extubation is her mental status.  On minimal vent settings with 30% FiO2, 5 of PEEP and respiratory rate of 25.  #LISA  Nephrology following.  Likely ATN from previous hemorrhagic shock.  Urine output improving.  Will initiate HD today.  On Lasix drip.  #Encephalopathy  CT head negative.  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  #HTN  #DM  #HL  #Obesity  #History of hypothyroid and normal TSH  #History of CKD  #History of lower extremity  "edema  #GERD    Plan:  - Continue to monitor CBC daily.  Has been off vasopressors.  - Initiate HD today after confirmation of her tunneled catheter placement.  Appreciate nephrology recommendations.  - Will hold off on SBT/SAT today.  - Monitor mental status post HD.  - Minimize sedative/psychotropic meds.  -Continue enteral nutrition.     IV/Access:   1. Venous access -2 peripheral IVs, right IJ HD catheter  2. Arterial access -A-line  3.  Plan  - central access required and necessary      ICU Prophylaxis:   1. DVT: Hep Subq/ LMWH/mechanical  2. VAP: HOB 30 degrees, chlorhexidine rinse  3. Stress Ulcer: PPI/H2 blocker  4. Restraints: Nonviolent soft two point restraints required and necessary for patient safety and continued cares and good effect as patient continues to pull at necessary lines, tubes despite education and distraction. Will readdress daily.   5. Wound care  -routine wound care post surgery  6. Feeding -enteral nutrition  7. Family Update: Will update daughter April when she comes at the bedside in the afternoon.  8. Disposition -was seen in the ICU today for need for mechanical ventilation and HD    Clinically Significant Risk Factors         # Hypernatremia: Highest Na = 146 mmol/L in last 2 days, will monitor as appropriate     # Anion Gap Metabolic Acidosis: Highest Anion Gap = 21 mmol/L in last 2 days, will monitor and treat as appropriate  # Hypoalbuminemia: Lowest albumin = 2.5 g/dL at 10/30/2023  4:34 AM, will monitor as appropriate   # Thrombocytopenia: Lowest platelets = 58 in last 2 days, will monitor for bleeding  # Acute Kidney Injury, unspecified: based on a >150% or 0.3 mg/dL increase in last creatinine compared to past 90 day average, will monitor renal function  # Hypertension: Noted on problem list        # Obesity: Estimated body mass index is 39.54 kg/m  as calculated from the following:    Height as of this encounter: 1.651 m (5' 5\").    Weight as of this encounter: 107.8 kg " (237 lb 9.6 oz).                                 Key Medications:      ceFAZolin  1 g Intravenous Q24H    chlorhexidine  15 mL Mouth/Throat Q12H    diltiazem  60 mg Oral or Feeding Tube Q6H NATACHA    hydrALAZINE  10 mg Oral or Feeding Tube Q8H    insulin aspart  1-12 Units Subcutaneous Q4H    levothyroxine  100 mcg Oral or NG Tube QAM AC    pantoprazole  40 mg Per Feeding Tube QAM AC    Or    pantoprazole  40 mg Intravenous QAM AC    polyethylene glycol  17 g Oral or NG Tube Daily    senna-docusate  1 tablet Oral or NG Tube BID    sodium chloride (PF)  3 mL Intracatheter Q8H      dextrose      dextrose      furosemide (LASIX) 100 mg in sodium chloride 0.9 % 100 mL infusion 20 mg/hr (10/30/23 0650)    HYDROmorphone Stopped (10/27/23 1048)    propofol Stopped (10/30/23 0830)    And    - MEDICATION INSTRUCTIONS -      norepinephrine Stopped (10/27/23 1937)    sodium chloride 10 mL/hr at 10/29/23 1924              Physical Examination:   Temp:  [97.5  F (36.4  C)-99  F (37.2  C)] 99  F (37.2  C)  Pulse:  [70-87] 77  Resp:  [11-26] 25  BP: (147-161)/(63-68) 152/63  MAP:  [70 mmHg-105 mmHg] 70 mmHg  Arterial Line BP: (124-163)/(46-72) 129/46  FiO2 (%):  [30 %] 30 %  SpO2:  [91 %-98 %] 93 %    Intake/Output Summary (Last 24 hours) at 10/30/2023 1010  Last data filed at 10/30/2023 0800  Gross per 24 hour   Intake 1625.43 ml   Output 1295 ml   Net 330.43 ml     Wt Readings from Last 4 Encounters:   10/30/23 107.8 kg (237 lb 9.6 oz)   10/19/23 95 kg (209 lb 8 oz)   09/28/23 97.1 kg (214 lb)   09/14/23 97.1 kg (214 lb)     Arterial Line BP: (124-163)/(46-72) 129/46  MAP:  [70 mmHg-105 mmHg] 70 mmHg  BP - Mean:  [91] 91  Vent Mode: CMV/AC  (Continuous Mandatory Ventilation/ Assist Control)  FiO2 (%): 30 %  Resp Rate (Set): 25 breaths/min  Tidal Volume (Set, mL): 400 mL  PEEP (cm H2O): 5 cmH2O  Resp: 25    Recent Labs   Lab 10/28/23  0425 10/27/23  2042 10/27/23  1753 10/27/23  1233   PH 7.34* 7.28* 7.31* 7.27*   PCO2 32* 36 35 37    PO2 100 110* 97 109*   HCO3 17* 17* 17* 17*   O2PER 40 45 45 45       GEN: no acute distress   HEENT: head ncat, sclera anicteric, OP patent, trachea midline   PULM: unlabored synchronous with vent, clear anteriorly    CV/COR: RRR S1S2 no gallop,  No rub, no murmur  ABD: soft nontender, hypoactive bowel sounds, no mass  EXT:  warm and well perfused x4  NEURO: PERRL, no obvious deficits  SKIN: no obvious rash  LINES: clean, dry intact         Data:   All data and imaging reviewed     ROUTINE ICU LABS (Last four results)  CMP  Recent Labs   Lab 10/30/23  0810 10/30/23  0434 10/30/23  0401 10/29/23  2348 10/29/23  0612 10/29/23  0414 10/28/23  0431 10/28/23  0426 10/27/23  2209 10/27/23  2018 10/27/23  0557 10/27/23  0556   NA  --  144  --   --   --  146*  --  145  --  144  --  142   POTASSIUM  --  5.1  --   --   --  4.8  --  4.8  --  4.8  --  4.7   CHLORIDE  --  108*  --   --   --  112*  --  112*  --  112*  --  113*   CO2  --  15*  --   --   --  15*  --  16*  --  16*  --  16*   ANIONGAP  --  21*  --   --   --  19*  --  17*  --  16*  --  13   * 141* 131* 135*   < > 122*   < > 131*   < > 148*   < > 139*   BUN  --  105.4*  --   --   --  78.7*  --  54.6*  --  48.0*  --  39.8*   CR  --  8.30*  --   --   --  7.34*  --  6.02*  --  5.42*  --  4.41*   GFRESTIMATED  --  4*  --   --   --  5*  --  7*  --  7*  --  9*   TERESA  --  7.4*  --   --   --  7.3*  --  7.4*  --  7.1*  --  7.0*   MAG  --  2.8*  --   --   --  2.7*  --  2.3  --  2.2  --  2.2   PHOS  --  6.6*  --   --   --  5.9*  --  4.7*  --  4.5  --  4.2   PROTTOTAL  --  4.5*  --   --   --  4.4*  --  4.0*  --   --   --  3.8*   ALBUMIN  --  2.5*  --   --   --  2.6*  --  2.5*  --   --   --  2.5*   BILITOTAL  --  0.3  --   --   --  0.3  --  0.4  --   --   --  0.2   ALKPHOS  --  82  --   --   --  66  --  51  --   --   --  47   AST  --  207*  --   --   --  741*  --  1,913*  --   --   --  858*   ALT  --  73*  --   --   --  113*  --  171*  --   --   --  137*    < > = values  "in this interval not displayed.     CBC  Recent Labs   Lab 10/30/23  0434 10/29/23  0414 10/28/23  1810 10/28/23  1218 10/28/23  0426 10/27/23  1233 10/27/23  0556   WBC 8.6 7.4  --   --  8.3  --  12.7*   RBC 2.73* 2.89*  --   --  2.91*  --  2.43*   HGB 8.0* 8.2* 8.3* 8.4* 8.5*  8.5*   < > 7.2*   HCT 24.4* 25.4*  --   --  25.6*  --  21.8*   MCV 89 88  --   --  88  --  90   MCH 29.3 28.4  --   --  29.2  --  29.6   MCHC 32.8 32.3  --   --  33.2  --  33.0   RDW 17.5* 17.4*  --   --  17.4*  --  16.7*   PLT 86* 58*  --   --  52*  --  74*    < > = values in this interval not displayed.     INR  Recent Labs   Lab 10/27/23  1022 10/26/23  0414 10/25/23  2114 10/25/23  1629   INR 1.57* 1.58* 1.73* 2.11*     Arterial Blood Gas  Recent Labs   Lab 10/28/23  0425 10/27/23  2042 10/27/23  1753 10/27/23  1233   PH 7.34* 7.28* 7.31* 7.27*   PCO2 32* 36 35 37   PO2 100 110* 97 109*   HCO3 17* 17* 17* 17*   O2PER 40 45 45 45       All cultures:  No results for input(s): \"CULT\" in the last 168 hours.  Recent Results (from the past 24 hour(s))   XR Chest Port 1 View    Narrative    EXAM: XR CHEST PORT 1 VIEW  LOCATION: [facility]  DATE: 10/29/2023    INDICATION: ETT positioning  COMPARISON: 10/20/2023      Impression    IMPRESSION: ET tube 5 cm from the deena. Enteric tube within the stomach with hiatal hernia. Lungs are clear. Right central line tip in the low SVC.         Billing: This patient is critically ill: Yes. Total critical care time today 60 min, excluding procedures.                         LITO Werner   of Medicine  [facility]  Pulmonary, Allergy, Critical Care and Sleep Medicine  Pager - [phone]    "

## 2023-10-30 NOTE — PROGRESS NOTES
[facility] ICU RESPIRATORY NOTE        Date of Admission: 10/25/2023    Date of Intubation (most recent): 10/25/23    Reason for Mechanical Ventilation: AW protection    Number of Days on Mechanical Ventilation: 6    Met Criteria for Spontaneous Breathing Trial: No    Reason for No Spontaneous Breathing Trial: Does not meet criteria at the moment    Significant Events Today: ETT advaced 2 cm to 23 at the teeth.     ABG Results:   Recent Labs   Lab 10/28/23  0425 10/27/23  2042 10/27/23  1753 10/27/23  1233   PH 7.34* 7.28* 7.31* 7.27*   PCO2 32* 36 35 37   PO2 100 110* 97 109*   HCO3 17* 17* 17* 17*   O2PER 40 45 45 45         Current Vent Settings: Vent Mode: CMV/AC  (Continuous Mandatory Ventilation/ Assist Control)  FiO2 (%): 30 %  Resp Rate (Set): 25 breaths/min  Tidal Volume (Set, mL): 400 mL  PEEP (cm H2O): 5 cmH2O  Resp: 24      Skin Assessment: Intact    Plan: Continue on full vent support and wean when ready    Dawna Palumbo RT on 10/30/2023 at 5:07 AM

## 2023-10-30 NOTE — CONSULTS
Care Management Initial Consult    General Information  Assessment completed with: Children, Daughter April by phone  Type of CM/SW Visit: CM Role Introduction    Primary Care Provider verified and updated as needed: Yes   Readmission within the last 30 days: no previous admission in last 30 days      Reason for Consult: discharge planning  Advance Care Planning: Advance Care Planning Reviewed: present on chart          Communication Assessment  Patient's communication style: spoken language (English or Bilingual)    Hearing Difficulty or Deaf: yes   Wear Glasses or Blind: yes    Cognitive  Cognitive/Neuro/Behavioral: .WDL except  Level of Consciousness: somnolent  Arousal Level: arouses to pain  Orientation: other (see comments) (KENDRICK, intubated)  Mood/Behavior: behavior appropriate to situation  Best Language:  (KENDRICK)  Speech: endotracheal tube    Living Environment:   People in home: alone     Current living Arrangements: apartment  Name of Facility: [email or web address] 55[address]   Able to return to prior arrangements: other (see comments) (pending continued recommendations)       Family/Social Support:  Care provided by: self  Provides care for: no one  Marital Status:   Children          Description of Support System: Supportive, Involved    Support Assessment: Adequate family and caregiver support, Adequate social supports    Current Resources:   Patient receiving home care services: No     Community Resources: None  Equipment currently used at home: walker, standard  Supplies currently used at home:      Employment/Financial:  Employment Status: retired        Financial Concerns: none   Referral to Financial Worker: No       Does the patient's insurance plan have a 3 day qualifying hospital stay waiver?  No    Lifestyle & Psychosocial Needs:  Social Determinants of Health     Food Insecurity: Low Risk  (10/19/2023)    Food Insecurity     Within the past 12 months, did you worry that your food would run out  before you got money to buy more?: No     Within the past 12 months, did the food you bought just not last and you didn t have money to get more?: No   Depression: Not at risk (5/2/2023)    PHQ-2     PHQ-2 Score: 0   Housing Stability: Low Risk  (10/19/2023)    Housing Stability     Do you have housing? : Yes     Are you worried about losing your housing?: No   Tobacco Use: Low Risk  (10/26/2023)    Patient History     Smoking Tobacco Use: Never     Smokeless Tobacco Use: Never     Passive Exposure: Not on file   Financial Resource Strain: Low Risk  (10/19/2023)    Financial Resource Strain     Within the past 12 months, have you or your family members you live with been unable to get utilities (heat, electricity) when it was really needed?: No   Alcohol Use: Not on file   Transportation Needs: Low Risk  (10/19/2023)    Transportation Needs     Within the past 12 months, has lack of transportation kept you from medical appointments, getting your medicines, non-medical meetings or appointments, work, or from getting things that you need?: No   Physical Activity: Not on file   Interpersonal Safety: Low Risk  (10/19/2023)    Interpersonal Safety     Do you feel physically and emotionally safe where you currently live?: Yes     Within the past 12 months, have you been hit, slapped, kicked or otherwise physically hurt by someone?: No     Within the past 12 months, have you been humiliated or emotionally abused in other ways by your partner or ex-partner?: No   Stress: Not on file   Social Connections: Not on file       Functional Status:  Prior to admission patient needed assistance: Per call to Daughter April prior to this admission patient was independent in all ADL's walker used at baseline. Patient was driving.               Mental Health Status:          Chemical Dependency Status:                Values/Beliefs:  Spiritual, Cultural Beliefs, Alevism Practices, Values that affect care:                 Additional  Information:  Writer did not meet with the patient currently intubated. Writer called Daughter April by phone. Explained role and scope of safe discharge planning.  April stated prior to this admission her mother was living independently in a 55+ apartment complex. Patient independent with all ADL's with use of a walker and driving. Patient has 5 children and April and her Sister Olivia live the closest and would most likely be the two available to help the patient.   In reviewing the current plan of care with April, Writer discussed the following  -Patient is requiring catheter for acute dialysis per Nephrology. If patient requires dialysis as an outpatient service we will work on getting a chair secured for the patient. April stated that the plan was to initially go to TCU after elective surgery to [facility].  If this is still recommended when therapies eval they would ask for an outpatient dialysis center closer to [place] ([facility]) and would ask for a later chair time to allow April to possibly bring the patient for dialysis runs. Will start sending for outpatient chair when determined by Nephrology.  -Writer briefly went over other possible recommendations such as ARU/TCU/Homecare pending further work up. 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    April did not have any additional questions at this time and is aware that Care Transitions will continue to follow for further discharge planning recommendations going forward.  She was given the number to CCRN for ICU.    steven Barcenas RN, BSN, ACM   Care Transitions Specialist  [facility]  Care Transitions Specialist  [id number] [id number]  [email or web address]  Office: [phone] Fax: [phone]  [facility]

## 2023-10-30 NOTE — IR NOTE
Interventional Radiology Intra-procedural Nursing Note    Patient Name: Mya Hui  Medical Record Number: [id number]  Today's Date: October 30, 2023    Start Time: 1126  End of procedure time: 1142  Procedure: Consented for CVC Tunneled line placement  Report given to: ICU RN  Time pt departs:  1153    Other Notes: Pt into IR suite 1 via cart. Pt awake and alert. To table in supine position. Monitoring equipment applied. VSS. Tele SR. Dr. Turner in room. Time out and procedure started. Pt tolerated procedure well. Debrief with Dr. Turner. CVC Tunneled line placed and pressure held until hemostasis achieved. Dressing CDI. No complications. Pt transferred back to ICU.    Medications:    Lidocaine 1% 6 ml  Lidocaine 1% with EPI 7 ml  Heparin 4,000 units for brandy Coello RN

## 2023-10-30 NOTE — PLAN OF CARE
Reason for Admission: Hemorrhagic shock     Neuro: Opens eyes to voice/pain, BLE withdraw to pain, spontaneous movement of L side of body, lethargic    RASS Goal: -1 to -2  Hemodynamic Goal: MAP >65, SBP <180  Cardio: WDL  Tele: SR     GI/: Voiding with Spicer catheter, minimal/adequate UOP.  Pulmonary: LS clear with thick, white secretions.   Skin: ELY drain incisions, back incision, scattered bruising, BR on buttocks, redness under breast folds and groin  Pain: Oxycodone given once     Drains/Lines: R/L PIV, R IJ CVC, R arterial line  Infusions: lasix, propofol   Activity: BR  Diet: NPO, TF turned off at 0000. Resume at 30 ml/hr after procedure.     Precautions: NA    End of shift summary: ETT advanced 2 cm by RT, CXR completed. Propofol initiated.      Plans/Procedures: Plan for HD catheter placement today.

## 2023-10-30 NOTE — PROGRESS NOTES
1530 - 1900 RN Shift Summary    No acute changes; Moves left upper and lower extremity against gravity; Opens eyes to speech; does not follow any commands. Other assessments unchanged compared to previous.

## 2023-10-30 NOTE — PLAN OF CARE
Goal Outcome Evaluation:      Plan of Care Reviewed With:  (Interdisciplinary bedside rounds)    Overall Patient Progress: no changeOverall Patient Progress: no change    Outcome Evaluation: TF to resume today -- plan for Novasource Renal d/t LISA and electrolytes.  See RD note for details.    Sonia Sanchez RD, LD, CNSC   Clinical Dietitian - [facility]

## 2023-10-30 NOTE — PROGRESS NOTES
Renal Medicine Progress Note            Assessment/Plan:     Assessment: Mya Hui is a 83-year-old woman with PMH DM2, hypothyroidism, HLD, HTN admitted 10/25/2023 due to hemorrhagic shock after lumbar spinal fusion.  Course complicated by LISA and encephalopathy.    Nonoliguric LISA, worsening  CKD IIIA  Anasarca  Hypoalbuminemia  Baseline creatinine 1.1-1.4, EGFR 45-50.  No history of appreciable proteinuria.  Worsening creatinine in setting of hemorrhagic shock, most currently has ATN as a result.  Has developed fluid overload, Lasix drip started with good output, however renal function continues to have anephric rising creatinine daily despite this.  She has a rising BUN and worsening acidosis, reasonable to start dialysis to improve mentation and improve fluid status.  -Tunneled dialysis catheter placed by IR on 10/31st   - HD session 10/30, can plan again for 11/1  -Continue Lasix drip 20 mg/h in between dialysis sessions  -Daily BMP  -Renally dose meds    Anion gap metabolic acidosis  Largely due to renal failure.  Should correct with dialysis.    Hemorrhagic shock, resolved  S/p lumbar fusion  Shock liver, improved  Underwent lumbar fusion on 10/25, had 2 L blood loss during surgery.  Developed hemorrhagic shock thereafter.  Multiple transfusions and pressors, currently not requiring pressors.  AP drain still in place.      Plan/Recs:  1) appreciate IR placement of tunneled dialysis catheter  2) HD today  3) continue Lasix drip while not on dialysis  4) next HD planned Wednesday 11/1    Plan discussed with Dr. Castro.  Reviewed notes from NS, intensivist, RNs.    Chance Chacon MD   [facility]  Office: [phone]          Interval History:      - no acute events overnight   - remains intubated on low vent settings   - remains on lasix 20 mg/h with 1.15 L UOP yesterday and so far today 470 ml since  midnight   - anephric rise in Cr daily despite improving UOP   - worsening acidosis  "and BUN   - tunneled HD catheter placed today, first HD session planned           Medications and Allergies:      ceFAZolin  1 g Intravenous Q24H    chlorhexidine  15 mL Mouth/Throat Q12H    diltiazem  60 mg Oral or Feeding Tube Q6H NATACHA    hydrALAZINE  10 mg Oral or Feeding Tube Q8H    insulin aspart  1-12 Units Subcutaneous Q4H    levothyroxine  100 mcg Oral or NG Tube QAM AC    pantoprazole  40 mg Per Feeding Tube QAM AC    Or    pantoprazole  40 mg Intravenous QAM AC    polyethylene glycol  17 g Oral or NG Tube Daily    senna-docusate  1 tablet Oral or NG Tube BID    sodium chloride (PF)  3 mL Intracatheter Q8H        Allergies   Allergen Reactions    Fentanyl Nausea and Vomiting     VERY sensitive to most narcotics if not all.    Morphine And Related Nausea            Physical Exam:   Vitals were reviewed  BP (!) 152/63   Pulse 77   Temp 99  F (37.2  C) (Axillary)   Resp 25   Ht 1.651 m (5' 5\")   Wt 107.8 kg (237 lb 9.6 oz)   LMP  (LMP Unknown)   SpO2 93%   BMI 39.54 kg/m      Wt Readings from Last 3 Encounters:   10/30/23 107.8 kg (237 lb 9.6 oz)   10/19/23 95 kg (209 lb 8 oz)   09/28/23 97.1 kg (214 lb)       Intake/Output Summary (Last 24 hours) at 10/30/2023 0922  Last data filed at 10/30/2023 0800  Gross per 24 hour   Intake 1717.43 ml   Output 1370 ml   Net 347.43 ml       GENERAL APPEARANCE: intubated, NAD   HEENT: ETT in place   RESP: coarse bilaterally   CV: RRR, no murmur  ABDOMEN: soft, nontender  EXTREMITIES/SKIN: 1-2+ LE pitting edema  NEURO:  sedated but awakens to voice   LINES:  + sykes with clear yellow urine             Data:     BMP  Recent Labs   Lab 10/30/23  0810 10/30/23  0434 10/30/23  0401 10/29/23  2348 10/29/23  0612 10/29/23  0414 10/28/23  0431 10/28/23  0426 10/27/23  2209 10/27/23  2018   NA  --  144  --   --   --  146*  --  145  --  144   POTASSIUM  --  5.1  --   --   --  4.8  --  4.8  --  4.8   CHLORIDE  --  108*  --   --   --  112*  --  112*  --  112*   TERESA  --  7.4*  -- "   --   --  7.3*  --  7.4*  --  7.1*   CO2  --  15*  --   --   --  15*  --  16*  --  16*   BUN  --  105.4*  --   --   --  78.7*  --  54.6*  --  48.0*   CR  --  8.30*  --   --   --  7.34*  --  6.02*  --  5.42*   * 141* 131* 135*   < > 122*   < > 131*   < > 148*    < > = values in this interval not displayed.     CBC  Recent Labs   Lab 10/30/23  0434 10/29/23  0414 10/28/23  1810 10/28/23  1218 10/28/23  0426 10/27/23  1233 10/27/23  0556   WBC 8.6 7.4  --   --  8.3  --  12.7*   HGB 8.0* 8.2* 8.3* 8.4* 8.5*  8.5*   < > 7.2*   HCT 24.4* 25.4*  --   --  25.6*  --  21.8*   MCV 89 88  --   --  88  --  90   PLT 86* 58*  --   --  52*  --  74*    < > = values in this interval not displayed.     Lab Results   Component Value Date     (H) 10/30/2023    ALT 73 (H) 10/30/2023    ALKPHOS 82 10/30/2023    BILITOTAL 0.3 10/30/2023     Lab Results   Component Value Date    INR 1.57 (H) 10/27/2023     Color Urine (no units)   Date Value   10/26/2023 Yellow   03/08/2019 Yellow     Appearance Urine (no units)   Date Value   10/26/2023 Slightly Cloudy (A)   03/08/2019 Cloudy     Glucose Urine (mg/dL)   Date Value   10/26/2023 500 (A)   03/08/2019 Negative     Bilirubin Urine (no units)   Date Value   10/26/2023 Negative   03/08/2019 Negative     Ketones Urine (mg/dL)   Date Value   10/26/2023 Trace (A)   03/08/2019 Negative     Specific Gravity Urine (no units)   Date Value   10/26/2023 1.024   03/08/2019 1.020     pH Urine   Date Value   10/26/2023 5.5   03/08/2019 5.0 pH     Protein Albumin Urine (mg/dL)   Date Value   10/26/2023 300 (A)   03/08/2019 100 (A)     Urobilinogen Urine   Date Value   01/17/2022 0.2 E.U./dL   08/10/2017 0.2 EU/dL     Nitrite Urine (no units)   Date Value   10/26/2023 Negative   03/08/2019 Negative     Leukocyte Esterase Urine (no units)   Date Value   10/26/2023 Negative   03/08/2019 Moderate (A)         Attestation:  I have reviewed today's vital signs, notes, medications, labs and  imaging.    Chance Chacon MD  [facility] - Nephrology  Office: [phone]

## 2023-10-30 NOTE — CONSULTS
Interventional Radiology Note  Inpatient - [facility]  10/30/2023    A:   83 year old female S/P lumbar spinal fusion, from T 11 to pelvis 10/25 with prior radicular symptoms and scoliosis- The surgery itself went well but she had hemorrhagic shock as a complication of procedure. She remains intubated and sedated in the ICU. She has LISA likely due to ATN that nephrology has been following. She will likely require dialysis for some time, so nephrology has requested IR place a tunneled dialysis catheter    P:    -Tunneled dialysis catheter placement today      Leatha Younger PA-C  Interventional Radiology  [facility] IR PA desk phone *[phone]

## 2023-10-30 NOTE — PRE-PROCEDURE
GENERAL PRE-PROCEDURE:   Procedure:  Tunneled dialysis catheter placement  Date/Time:  10/30/2023 7:47 AM    Written consent obtained?: Yes    Risks and benefits: Risks, benefits and alternatives were discussed    Consent given by: Daughter- Liberty Clements.  Expected level of sedation:  Moderate  Appropriately NPO:  Yes  ASA Class:  3  Mallampati  :  N/A- Alternate secured airway

## 2023-10-31 ENCOUNTER — APPOINTMENT (OUTPATIENT)
Dept: GENERAL RADIOLOGY | Facility: CLINIC | Age: 83
DRG: 453 | End: 2023-10-31
Attending: INTERNAL MEDICINE
Payer: COMMERCIAL

## 2023-10-31 ENCOUNTER — APPOINTMENT (OUTPATIENT)
Dept: CT IMAGING | Facility: CLINIC | Age: 83
DRG: 453 | End: 2023-10-31
Attending: INTERNAL MEDICINE
Payer: COMMERCIAL

## 2023-10-31 ENCOUNTER — APPOINTMENT (OUTPATIENT)
Dept: MRI IMAGING | Facility: CLINIC | Age: 83
DRG: 453 | End: 2023-10-31
Attending: NURSE PRACTITIONER
Payer: COMMERCIAL

## 2023-10-31 LAB
ABO/RH(D): NORMAL
ALBUMIN SERPL BCG-MCNC: 2.6 G/DL (ref 3.5–5.2)
ALLEN'S TEST: ABNORMAL
ALP SERPL-CCNC: 89 U/L (ref 35–104)
ALT SERPL W P-5'-P-CCNC: 47 U/L (ref 0–50)
AMMONIA PLAS-SCNC: 42 UMOL/L (ref 11–51)
ANION GAP SERPL CALCULATED.3IONS-SCNC: 18 MMOL/L (ref 7–15)
ANTIBODY SCREEN: NEGATIVE
AST SERPL W P-5'-P-CCNC: 73 U/L (ref 0–45)
BASE EXCESS BLDA CALC-SCNC: -3 MMOL/L (ref -9–1.8)
BILIRUB DIRECT SERPL-MCNC: <0.2 MG/DL (ref 0–0.3)
BILIRUB SERPL-MCNC: 0.3 MG/DL
BLD PROD TYP BPU: NORMAL
BLOOD COMPONENT TYPE: NORMAL
BUN SERPL-MCNC: 82.6 MG/DL (ref 8–23)
CALCIUM SERPL-MCNC: 7.2 MG/DL (ref 8.8–10.2)
CHLORIDE SERPL-SCNC: 102 MMOL/L (ref 98–107)
CODING SYSTEM: NORMAL
CREAT SERPL-MCNC: 5.75 MG/DL (ref 0.51–0.95)
CROSSMATCH: NORMAL
DEPRECATED HCO3 PLAS-SCNC: 20 MMOL/L (ref 22–29)
EGFRCR SERPLBLD CKD-EPI 2021: 7 ML/MIN/1.73M2
ERYTHROCYTE [DISTWIDTH] IN BLOOD BY AUTOMATED COUNT: 17.5 % (ref 10–15)
GLUCOSE BLDC GLUCOMTR-MCNC: 119 MG/DL (ref 70–99)
GLUCOSE BLDC GLUCOMTR-MCNC: 123 MG/DL (ref 70–99)
GLUCOSE BLDC GLUCOMTR-MCNC: 148 MG/DL (ref 70–99)
GLUCOSE BLDC GLUCOMTR-MCNC: 149 MG/DL (ref 70–99)
GLUCOSE BLDC GLUCOMTR-MCNC: 154 MG/DL (ref 70–99)
GLUCOSE BLDC GLUCOMTR-MCNC: 179 MG/DL (ref 70–99)
GLUCOSE SERPL-MCNC: 177 MG/DL (ref 70–99)
HCO3 BLD-SCNC: 21 MMOL/L (ref 21–28)
HCT VFR BLD AUTO: 21.6 % (ref 35–47)
HGB BLD-MCNC: 7.1 G/DL (ref 11.7–15.7)
HGB BLD-MCNC: 8 G/DL (ref 11.7–15.7)
ISSUE DATE AND TIME: NORMAL
MAGNESIUM SERPL-MCNC: 2.1 MG/DL (ref 1.7–2.3)
MCH RBC QN AUTO: 29 PG (ref 26.5–33)
MCHC RBC AUTO-ENTMCNC: 32.9 G/DL (ref 31.5–36.5)
MCV RBC AUTO: 88 FL (ref 78–100)
O2/TOTAL GAS SETTING VFR VENT: 50 %
PCO2 BLD: 33 MM HG (ref 35–45)
PH BLD: 7.42 [PH] (ref 7.35–7.45)
PHOSPHATE SERPL-MCNC: 5.8 MG/DL (ref 2.5–4.5)
PLATELET # BLD AUTO: 96 10E3/UL (ref 150–450)
PO2 BLD: 92 MM HG (ref 80–105)
POTASSIUM SERPL-SCNC: 4.4 MMOL/L (ref 3.4–5.3)
PROCALCITONIN SERPL IA-MCNC: 3.01 NG/ML
PROT SERPL-MCNC: 4.2 G/DL (ref 6.4–8.3)
RBC # BLD AUTO: 2.45 10E6/UL (ref 3.8–5.2)
SODIUM SERPL-SCNC: 140 MMOL/L (ref 135–145)
SPECIMEN EXPIRATION DATE: NORMAL
T4 FREE SERPL-MCNC: 0.64 NG/DL (ref 0.9–1.7)
TSH SERPL DL<=0.005 MIU/L-ACNC: 0.17 UIU/ML (ref 0.3–4.2)
UNIT ABO/RH: NORMAL
UNIT NUMBER: NORMAL
UNIT STATUS: NORMAL
UNIT TYPE ISBT: 5100
WBC # BLD AUTO: 5.1 10E3/UL (ref 4–11)

## 2023-10-31 PROCEDURE — 70553 MRI BRAIN STEM W/O & W/DYE: CPT

## 2023-10-31 PROCEDURE — 80053 COMPREHEN METABOLIC PANEL: CPT | Performed by: INTERNAL MEDICINE

## 2023-10-31 PROCEDURE — 999N000185 HC STATISTIC TRANSPORT TIME EA 15 MIN

## 2023-10-31 PROCEDURE — 71260 CT THORAX DX C+: CPT

## 2023-10-31 PROCEDURE — 99207 PR APP CREDIT; MD BILLING SHARED VISIT: CPT | Performed by: NURSE PRACTITIONER

## 2023-10-31 PROCEDURE — 99232 SBSQ HOSP IP/OBS MODERATE 35: CPT | Performed by: STUDENT IN AN ORGANIZED HEALTH CARE EDUCATION/TRAINING PROGRAM

## 2023-10-31 PROCEDURE — 999N000009 HC STATISTIC AIRWAY CARE

## 2023-10-31 PROCEDURE — 250N000009 HC RX 250: Performed by: NURSE PRACTITIONER

## 2023-10-31 PROCEDURE — 272N000433 HC KIT CATH IV 18 OR 20G CM, POWERGLIDE W MAX BARRIER

## 2023-10-31 PROCEDURE — 99291 CRITICAL CARE FIRST HOUR: CPT | Mod: FS | Performed by: NURSE PRACTITIONER

## 2023-10-31 PROCEDURE — 86923 COMPATIBILITY TEST ELECTRIC: CPT | Performed by: INTERNAL MEDICINE

## 2023-10-31 PROCEDURE — 87040 BLOOD CULTURE FOR BACTERIA: CPT | Performed by: INTERNAL MEDICINE

## 2023-10-31 PROCEDURE — 84443 ASSAY THYROID STIM HORMONE: CPT | Performed by: INTERNAL MEDICINE

## 2023-10-31 PROCEDURE — 85027 COMPLETE CBC AUTOMATED: CPT | Performed by: INTERNAL MEDICINE

## 2023-10-31 PROCEDURE — 94003 VENT MGMT INPAT SUBQ DAY: CPT

## 2023-10-31 PROCEDURE — 250N000011 HC RX IP 250 OP 636: Performed by: INTERNAL MEDICINE

## 2023-10-31 PROCEDURE — 258N000003 HC RX IP 258 OP 636: Performed by: INTERNAL MEDICINE

## 2023-10-31 PROCEDURE — 86900 BLOOD TYPING SEROLOGIC ABO: CPT | Performed by: INTERNAL MEDICINE

## 2023-10-31 PROCEDURE — 84439 ASSAY OF FREE THYROXINE: CPT | Performed by: INTERNAL MEDICINE

## 2023-10-31 PROCEDURE — 200N000001 HC R&B ICU

## 2023-10-31 PROCEDURE — P9016 RBC LEUKOCYTES REDUCED: HCPCS | Performed by: INTERNAL MEDICINE

## 2023-10-31 PROCEDURE — 255N000002 HC RX 255 OP 636: Performed by: NEUROLOGICAL SURGERY

## 2023-10-31 PROCEDURE — 99291 CRITICAL CARE FIRST HOUR: CPT | Performed by: INTERNAL MEDICINE

## 2023-10-31 PROCEDURE — 999N000157 HC STATISTIC RCP TIME EA 10 MIN

## 2023-10-31 PROCEDURE — 71045 X-RAY EXAM CHEST 1 VIEW: CPT

## 2023-10-31 PROCEDURE — 84100 ASSAY OF PHOSPHORUS: CPT | Performed by: INTERNAL MEDICINE

## 2023-10-31 PROCEDURE — 250N000011 HC RX IP 250 OP 636: Mod: JZ | Performed by: INTERNAL MEDICINE

## 2023-10-31 PROCEDURE — 250N000009 HC RX 250: Performed by: NEUROLOGICAL SURGERY

## 2023-10-31 PROCEDURE — 82248 BILIRUBIN DIRECT: CPT | Performed by: INTERNAL MEDICINE

## 2023-10-31 PROCEDURE — 36569 INSJ PICC 5 YR+ W/O IMAGING: CPT

## 2023-10-31 PROCEDURE — 250N000011 HC RX IP 250 OP 636: Performed by: NEUROLOGICAL SURGERY

## 2023-10-31 PROCEDURE — 250N000012 HC RX MED GY IP 250 OP 636 PS 637: Performed by: INTERNAL MEDICINE

## 2023-10-31 PROCEDURE — 82803 BLOOD GASES ANY COMBINATION: CPT | Performed by: INTERNAL MEDICINE

## 2023-10-31 PROCEDURE — 70450 CT HEAD/BRAIN W/O DYE: CPT

## 2023-10-31 PROCEDURE — 250N000013 HC RX MED GY IP 250 OP 250 PS 637: Performed by: NURSE PRACTITIONER

## 2023-10-31 PROCEDURE — 84145 PROCALCITONIN (PCT): CPT | Performed by: INTERNAL MEDICINE

## 2023-10-31 PROCEDURE — A9585 GADOBUTROL INJECTION: HCPCS | Performed by: NEUROLOGICAL SURGERY

## 2023-10-31 PROCEDURE — 83735 ASSAY OF MAGNESIUM: CPT | Performed by: INTERNAL MEDICINE

## 2023-10-31 PROCEDURE — 250N000013 HC RX MED GY IP 250 OP 250 PS 637: Performed by: INTERNAL MEDICINE

## 2023-10-31 PROCEDURE — 82140 ASSAY OF AMMONIA: CPT | Performed by: INTERNAL MEDICINE

## 2023-10-31 PROCEDURE — 36415 COLL VENOUS BLD VENIPUNCTURE: CPT | Performed by: INTERNAL MEDICINE

## 2023-10-31 PROCEDURE — 85018 HEMOGLOBIN: CPT | Performed by: INTERNAL MEDICINE

## 2023-10-31 RX ORDER — GADOBUTROL 604.72 MG/ML
10 INJECTION INTRAVENOUS ONCE
Status: COMPLETED | OUTPATIENT
Start: 2023-10-31 | End: 2023-10-31

## 2023-10-31 RX ORDER — LABETALOL HYDROCHLORIDE 5 MG/ML
10 INJECTION, SOLUTION INTRAVENOUS EVERY 4 HOURS PRN
Status: DISCONTINUED | OUTPATIENT
Start: 2023-10-31 | End: 2023-12-01 | Stop reason: HOSPADM

## 2023-10-31 RX ORDER — IOPAMIDOL 755 MG/ML
115 INJECTION, SOLUTION INTRAVASCULAR ONCE
Status: COMPLETED | OUTPATIENT
Start: 2023-10-31 | End: 2023-10-31

## 2023-10-31 RX ORDER — LIDOCAINE 40 MG/G
CREAM TOPICAL
Status: DISCONTINUED | OUTPATIENT
Start: 2023-10-31 | End: 2023-11-08

## 2023-10-31 RX ORDER — ACETYLCYSTEINE 200 MG/ML
2 SOLUTION ORAL; RESPIRATORY (INHALATION) EVERY 4 HOURS
Status: DISCONTINUED | OUTPATIENT
Start: 2023-10-31 | End: 2023-10-31

## 2023-10-31 RX ORDER — PIPERACILLIN SODIUM, TAZOBACTAM SODIUM 2; .25 G/10ML; G/10ML
2.25 INJECTION, POWDER, LYOPHILIZED, FOR SOLUTION INTRAVENOUS EVERY 6 HOURS
Status: DISCONTINUED | OUTPATIENT
Start: 2023-10-31 | End: 2023-11-01

## 2023-10-31 RX ADMIN — VANCOMYCIN HYDROCHLORIDE 1750 MG: 10 INJECTION, POWDER, LYOPHILIZED, FOR SOLUTION INTRAVENOUS at 18:14

## 2023-10-31 RX ADMIN — LEVOTHYROXINE SODIUM 100 MCG: 100 TABLET ORAL at 07:33

## 2023-10-31 RX ADMIN — ACETAMINOPHEN 650 MG: 325 TABLET, FILM COATED ORAL at 01:33

## 2023-10-31 RX ADMIN — SODIUM CHLORIDE 79 ML: 9 INJECTION, SOLUTION INTRAVENOUS at 14:17

## 2023-10-31 RX ADMIN — INSULIN ASPART 1 UNITS: 100 INJECTION, SOLUTION INTRAVENOUS; SUBCUTANEOUS at 13:12

## 2023-10-31 RX ADMIN — OXYCODONE HYDROCHLORIDE 5 MG: 5 TABLET ORAL at 01:33

## 2023-10-31 RX ADMIN — INSULIN ASPART 1 UNITS: 100 INJECTION, SOLUTION INTRAVENOUS; SUBCUTANEOUS at 18:25

## 2023-10-31 RX ADMIN — LIDOCAINE HYDROCHLORIDE ANHYDROUS 1 ML: 10 INJECTION, SOLUTION INFILTRATION at 18:00

## 2023-10-31 RX ADMIN — HYDRALAZINE HYDROCHLORIDE 10 MG: 10 TABLET ORAL at 16:33

## 2023-10-31 RX ADMIN — DILTIAZEM HYDROCHLORIDE 60 MG: 60 TABLET, FILM COATED ORAL at 21:04

## 2023-10-31 RX ADMIN — DILTIAZEM HYDROCHLORIDE 60 MG: 60 TABLET, FILM COATED ORAL at 00:22

## 2023-10-31 RX ADMIN — PIPERACILLIN AND TAZOBACTAM 2.25 G: 2; .25 INJECTION, POWDER, FOR SOLUTION INTRAVENOUS at 23:57

## 2023-10-31 RX ADMIN — CHLORHEXIDINE GLUCONATE 15 ML: 1.2 RINSE ORAL at 21:04

## 2023-10-31 RX ADMIN — INSULIN ASPART 1 UNITS: 100 INJECTION, SOLUTION INTRAVENOUS; SUBCUTANEOUS at 04:15

## 2023-10-31 RX ADMIN — Medication 40 MG: at 06:33

## 2023-10-31 RX ADMIN — PIPERACILLIN AND TAZOBACTAM 2.25 G: 2; .25 INJECTION, POWDER, FOR SOLUTION INTRAVENOUS at 18:22

## 2023-10-31 RX ADMIN — DILTIAZEM HYDROCHLORIDE 60 MG: 60 TABLET, FILM COATED ORAL at 16:34

## 2023-10-31 RX ADMIN — CHLORHEXIDINE GLUCONATE 15 ML: 1.2 RINSE ORAL at 07:33

## 2023-10-31 RX ADMIN — FENTANYL CITRATE 25 MCG: 50 INJECTION INTRAMUSCULAR; INTRAVENOUS at 00:07

## 2023-10-31 RX ADMIN — GADOBUTROL 10 ML: 604.72 INJECTION INTRAVENOUS at 22:48

## 2023-10-31 RX ADMIN — HYDRALAZINE HYDROCHLORIDE 10 MG: 10 TABLET ORAL at 21:04

## 2023-10-31 RX ADMIN — PIPERACILLIN AND TAZOBACTAM 2.25 G: 2; .25 INJECTION, POWDER, FOR SOLUTION INTRAVENOUS at 12:36

## 2023-10-31 RX ADMIN — Medication 5 ML: at 08:08

## 2023-10-31 RX ADMIN — INSULIN ASPART 2 UNITS: 100 INJECTION, SOLUTION INTRAVENOUS; SUBCUTANEOUS at 08:09

## 2023-10-31 RX ADMIN — HYDRALAZINE HYDROCHLORIDE 10 MG: 10 TABLET ORAL at 06:33

## 2023-10-31 RX ADMIN — DILTIAZEM HYDROCHLORIDE 60 MG: 60 TABLET, FILM COATED ORAL at 06:33

## 2023-10-31 RX ADMIN — FUROSEMIDE 20 MG/HR: 10 INJECTION, SOLUTION INTRAMUSCULAR; INTRAVENOUS at 03:49

## 2023-10-31 RX ADMIN — IOPAMIDOL 115 ML: 755 INJECTION, SOLUTION INTRAVENOUS at 14:16

## 2023-10-31 ASSESSMENT — ACTIVITIES OF DAILY LIVING (ADL)
ADLS_ACUITY_SCORE: 44

## 2023-10-31 NOTE — ADDENDUM NOTE
Addendum  created 10/31/23 175 by Warren Orona MD    Intraprocedure Event deleted, Intraprocedure Event edited

## 2023-10-31 NOTE — PROGRESS NOTES
Discussed Chest Xray result with Dr Castro, suspicion for hiatal hernia. Per provider will hold tube feed, and not use OG for medication administration. CT ordered for further evaluation.     CT results reviewed with Dr. Glover regarding OG, ok to use for meds but hold tube feed.

## 2023-10-31 NOTE — PROGRESS NOTES
ICU End of Shift Progress note: *for vital signs and complete assessments, please see flowsheets.*    Neuro: intermittently opens eyes to voice, became more restless and tachypenic as night progressed- low dose precedex on for a short time. Does not follow commands. Moves LUE gross movements spontaneously. BLE withdraw to pain, no response in RUE. PERRL, intermittent deviation of eyes.   Cardiac: SR, soft BP when precedex on/after pain meds  Resp: coarse LS, frequent suctioning required- tan thick secretions. On 70% and PEEP 5  GI: multiple loose/soft BP overnight, tolerating TF at goal, held Senna  : foely in place with minimal output, lasix GTT, down about 3 Kg today, HD completed yesterday   Skin: excoriated areas around breast and pannus, scattered bruising, weepy skin around previous IV punctures and arterial line.   Gtts: lasix     Plan: therapies

## 2023-10-31 NOTE — PROGRESS NOTES
Renal Medicine Progress Note            Assessment/Plan:     Assessment: Mya Hui is a 83-year-old woman with PMH DM2, hypothyroidism, HLD, HTN admitted 10/25/2023 due to hemorrhagic shock after lumbar spinal fusion.  Course complicated by LISA and encephalopathy.    Nonoliguric LISA, worsening  CKD IIIA  Anasarca  Hypoalbuminemia  Baseline creatinine 1.1-1.4, EGFR 45-50.  No history of appreciable proteinuria.  Worsening creatinine in setting of hemorrhagic shock, most currently has ATN as a result.  Has developed fluid overload, Lasix drip started with good output, however renal function continues to have anephric rising creatinine daily despite this.  She has a rising BUN and worsening acidosis, reasonable to start dialysis to improve mentation and improve fluid status.  -Tunneled dialysis catheter placed by IR on 10/31st   - HD session 11/1  -Discontinue Lasix drip  -Daily BMP  -Renally dose meds    Anion gap metabolic acidosis  Largely due to renal failure.  Should correct with dialysis.    Hemorrhagic shock, resolved  S/p lumbar fusion  Shock liver, improved  Underwent lumbar fusion on 10/25, had 2 L blood loss during surgery.  Developed hemorrhagic shock thereafter.  Multiple transfusions and pressors, currently not requiring pressors.  ELY drains still in place.      Plan/Recs:  1) HD tomorrow  2) we will discontinue Lasix drip as urine output minimal      Plan discussed with Dr. Castro.  Reviewed notes from NSG, intensivist, RNs.    Chance Chacon MD   [facility]  Office: [phone]          Interval History:     Slight increase in ventilator requirements overnight, reason why it not entirely clear.  Weaning down this morning  Loss of access, awaiting midline.  Would avoid using dialysis catheter for anything other than dialysis and drawing labs.  Urine output diminished   continues to have anasarca  Remains off of pressors  Remains encephalopathic         Medications and Allergies:  "     - MEDICATION INSTRUCTIONS for Dialysis Patients -   Does not apply See Admin Instructions    B and C vitamin Complex with folic acid  5 mL Per Feeding Tube Daily    ceFAZolin  1 g Intravenous Q24H    chlorhexidine  15 mL Mouth/Throat Q12H    diltiazem  60 mg Oral or Feeding Tube Q6H NATACHA    hydrALAZINE  10 mg Oral or Feeding Tube Q8H    insulin aspart  1-12 Units Subcutaneous Q4H    levothyroxine  100 mcg Oral or NG Tube QAM AC    pantoprazole  40 mg Per Feeding Tube QAM AC    Or    pantoprazole  40 mg Intravenous QAM AC    polyethylene glycol  17 g Oral or NG Tube Daily    protein modular  1 packet Per Feeding Tube Daily    senna-docusate  1 tablet Oral or NG Tube BID    sodium chloride (PF)  3 mL Intracatheter Q8H        Allergies   Allergen Reactions    Fentanyl Nausea and Vomiting     VERY sensitive to most narcotics if not all.    Morphine And Related Nausea            Physical Exam:   Vitals were reviewed  /49   Pulse 73   Temp 99.5  F (37.5  C) (Axillary)   Resp 25   Ht 1.651 m (5' 5\")   Wt 104.1 kg (229 lb 6.4 oz)   LMP  (LMP Unknown)   SpO2 95%   BMI 38.17 kg/m      Wt Readings from Last 3 Encounters:   10/31/23 104.1 kg (229 lb 6.4 oz)   10/19/23 95 kg (209 lb 8 oz)   09/28/23 97.1 kg (214 lb)       Intake/Output Summary (Last 24 hours) at 10/30/2023 0922  Last data filed at 10/30/2023 0800  Gross per 24 hour   Intake 1717.43 ml   Output 1370 ml   Net 347.43 ml       GENERAL APPEARANCE: intubated, NAD   HEENT: ETT in place   RESP: coarse bilaterally   CV: RRR, no murmur  ABDOMEN: soft, nontender  EXTREMITIES/SKIN: Anasarca, 2-3+ pitting edema in lower extremities.  Weeping of dependent areas  NEURO:  sedated   LINES:  + sykes with concentrated yellow urine             Data:     BMP  Recent Labs   Lab 10/31/23  0801 10/31/23  0412 10/31/23  0409 10/31/23  0017 10/30/23  0810 10/30/23  0434 10/29/23  0612 10/29/23  0414 10/28/23  0431 10/28/23  0426   NA  --   --  140  --   --  144  --  " 146*  --  145   POTASSIUM  --   --  4.4  --   --  5.1  --  4.8  --  4.8   CHLORIDE  --   --  102  --   --  108*  --  112*  --  112*   TERESA  --   --  7.2*  --   --  7.4*  --  7.3*  --  7.4*   CO2  --   --  20*  --   --  15*  --  15*  --  16*   BUN  --   --  82.6*  --   --  105.4*  --  78.7*  --  54.6*   CR  --   --  5.75*  --   --  8.30*  --  7.34*  --  6.02*   * 154* 177* 123*   < > 141*   < > 122*   < > 131*    < > = values in this interval not displayed.     CBC  Recent Labs   Lab 10/31/23  0409 10/30/23  0434 10/29/23  0414 10/28/23  1810 10/28/23  1218 10/28/23  0426   WBC 5.1 8.6 7.4  --   --  8.3   HGB 7.1* 8.0* 8.2* 8.3*   < > 8.5*  8.5*   HCT 21.6* 24.4* 25.4*  --   --  25.6*   MCV 88 89 88  --   --  88   PLT 96* 86* 58*  --   --  52*    < > = values in this interval not displayed.     Lab Results   Component Value Date    AST 73 (H) 10/31/2023    ALT 47 10/31/2023    ALKPHOS 89 10/31/2023    BILITOTAL 0.3 10/31/2023     Lab Results   Component Value Date    INR 1.57 (H) 10/27/2023     Color Urine (no units)   Date Value   10/26/2023 Yellow   03/08/2019 Yellow     Appearance Urine (no units)   Date Value   10/26/2023 Slightly Cloudy (A)   03/08/2019 Cloudy     Glucose Urine (mg/dL)   Date Value   10/26/2023 500 (A)   03/08/2019 Negative     Bilirubin Urine (no units)   Date Value   10/26/2023 Negative   03/08/2019 Negative     Ketones Urine (mg/dL)   Date Value   10/26/2023 Trace (A)   03/08/2019 Negative     Specific Gravity Urine (no units)   Date Value   10/26/2023 1.024   03/08/2019 1.020     pH Urine   Date Value   10/26/2023 5.5   03/08/2019 5.0 pH     Protein Albumin Urine (mg/dL)   Date Value   10/26/2023 300 (A)   03/08/2019 100 (A)     Urobilinogen Urine   Date Value   01/17/2022 0.2 E.U./dL   08/10/2017 0.2 EU/dL     Nitrite Urine (no units)   Date Value   10/26/2023 Negative   03/08/2019 Negative     Leukocyte Esterase Urine (no units)   Date Value   10/26/2023 Negative   03/08/2019  Moderate (A)         Attestation:  I have reviewed today's vital signs, notes, medications, labs and imaging.    Chance Chacon MD  [facility] - Nephrology  Office: [phone]

## 2023-10-31 NOTE — CONSULTS
[facility]    Vascular Neurology Consult Note    Reason for Consult:      Acute encephalopathy, post lumbar spinal fusion surgery, hemorrhagic shock       Chief Complaint: No chief complaint on file.       HPI  Mya Hui is a 83 year old female with PMHx carotid artery stenosis, HLD, hard of hearing, HTN, hypothyroid, DM2, cataract, OA, chronic right shoulder pain/rotator cuff arthropathy, CKD, migraine who was admitted 10/24 post T11-pelvis robotic decompression and fusion for lumbar scoliosis and radiculopathy. Per chart review, she had severe right leg pain pre op. Neurosurgery exam in September does not note any extremity weakness but she was using a walker because her right leg sometimes gave out on her. EMG was consistent with L5 and S1 radiculopathy. Her intraoperative course was complicated by 3.5L EBL and hypotension. She received 6 units PRBCs and 2 units FFP and was started on levophed. In the ICU, post-op, she received 2 additional units of FFP, 10 units cryo, and 1 unit of platelets. She also developed LISA for which she is undergoing dialysis, had ST changes intra-op, and shock liver. She remains intubated. Neurology is consulted due to ongoing encephalopathy. She is on cefazolin for perioperative pharmacoppx and zosyn and vancomycin due to concern for sepsis. She isn't currently receiving any continuous sedation, precedex has been off since 0345.    Evaluation Summarized    MRI/Head CT CT 10/27: no acute findings, moderate CSVID, mild global volume loss    Intracranial Vasculature N/A   Cervical Vasculature N/A      Echocardiogram EF 65-70%, no WMA, normal atrial sizes, no atrial shunt    EKG/Telemetry ST    Other Testing Ammonia 42  TSH 0.17, free T4 0.64  Procalcitonin 3.01     LDL  No lab value available in past 30 days   A1C  10/19/2023: 6.2 %  10/25/2023: 6.3 %   Troponin No lab value available in past 48 hrs       Impression  #Encephalopathy  #lumbar  "scoliosis and L5-S1 radiculopathy s/p T11-pelvis robotic decompression and fusion; intra-op course complicated by hemorrhagic shock  #post-operative course complicated by LISA (now receiving dialysis) and shock liver    Recommendations   - blood cultures pending   - CT C/A/P pending to look for source of infection and bleeding   - cEEG  - MRI brain w/ and w/out contrast   - avoid sedating medications if able   - normotension   - neuro checks q2h     Thank you for this consult. We will continue to follow.     Fernanda Serrano NP  Vascular Neurology    To page me or covering stroke neurology team member, click here: [facility]  Choose \"On Call\" tab at top, then select \"NEUROLOGY/ALL SITES\" from middle drop-down box, press Enter, then look for \"stroke\" or \"telestroke\" for your site.  _____________________________________________________    Clinically Significant Risk Factors             # Anion Gap Metabolic Acidosis: Highest Anion Gap = 21 mmol/L in last 2 days, will monitor and treat as appropriate  # Hypoalbuminemia: Lowest albumin = 2.5 g/dL at 10/30/2023  4:34 AM, will monitor as appropriate   # Thrombocytopenia: Lowest platelets = 86 in last 2 days, will monitor for bleeding   # Hypertension: Noted on problem list        # Obesity: Estimated body mass index is 38.17 kg/m  as calculated from the following:    Height as of this encounter: 1.651 m (5' 5\").    Weight as of this encounter: 104.1 kg (229 lb 6.4 oz).   # Moderate Malnutrition: based on nutrition assessment    # Financial/Environmental Concerns: none           Past Medical History    Past Medical History:   Diagnosis Date    Acute cholecystitis 3/23/2018    Acute kidney injury (H24) 9/10/2017    Arthritis     Dehydration 9/4/2017    Diabetes type 2, controlled (H)     Elevated lactic acid level 9/10/2017    GERD (gastroesophageal reflux disease)     History of renal calculi     HTN, goal below 140/90     Hyperlipidaemia LDL goal < 100     Hypokalemia 9/3/2017 "    Hypothyroid     Insomnia     Left carotid stenosis     Migraine     Motion sickness     PONV (postoperative nausea and vomiting)     Sciatica of right side 6/28/2013    Type 2 diabetes mellitus without complication, without long-term current use of insulin (H) 2/16/2017     Medications   Home Meds  Prior to Admission medications    Medication Sig Start Date End Date Taking? Authorizing Provider   acetaminophen (TYLENOL) 500 MG tablet Take 500-1,000 mg by mouth every 6 hours as needed for mild pain   Yes Reported, Patient   diltiazem ER COATED BEADS (CARDIZEM CD/CARTIA XT) 120 MG 24 hr capsule Take 1 capsule (120 mg) by mouth 2 times daily 5/2/23  Yes Kisha Stinson PA-C   levothyroxine (SYNTHROID/LEVOTHROID) 100 MCG tablet Take 1 tablet (100 mcg) by mouth daily 5/3/23  Yes Kisha Stinson PA-C   lisinopril (ZESTRIL) 40 MG tablet Take 1 tablet (40 mg) by mouth daily 5/2/23  Yes Kisha Stinson PA-C   oxybutynin ER (DITROPAN XL) 10 MG 24 hr tablet Take 1 tablet (10 mg) by mouth daily 5/2/23  Yes Kisha Stinson PA-C   pantoprazole (PROTONIX) 40 MG EC tablet Take 1 tablet (40 mg) by mouth 2 times daily 5/2/23  Yes Kisha Stinson PA-C   polyethylene glycol (MIRALAX) 17 g packet Take 1 packet by mouth every evening as needed   Yes Reported, Patient   potassium chloride ER (K-TAB/KLOR-CON) 10 MEQ CR tablet Take 1 tablet (10 mEq) by mouth 3 times daily 5/2/23  Yes Kisha Stinson PA-C   pravastatin (PRAVACHOL) 40 MG tablet Take 1 tablet (40 mg) by mouth daily 5/2/23  Yes Kisha Stinson PA-C   blood glucose (ACCU-CHEK GUIDE) test strip Use to test blood sugar 1 times daily or as directed. 5/2/23   Kisha Stinson PA-C   blood glucose monitoring (ACCU-CHEK FASTCLIX) lancets USE TO TEST BLOOD SUGARS ONCE DAILY 9/30/22   Jim Marley PA-C   cholestyramine (QUESTRAN) 4 g packet DISSOLVE AND  TAKE ONE PACKET BY MOUTH TWICE A DAY 10/30/23   Kisha Stinson PA-C   Incontinence Supply Disposable (PROTECTIVE UNDERWEAR SUPER LG) MISC 3 each daily 8/31/21   Ray Castrejon PA-C   order for DME Equipment being ordered: [employer] shoes and insoles 10/29/19   Chanell Nelson, APRN CNP       Scheduled Meds   - MEDICATION INSTRUCTIONS for Dialysis Patients -   Does not apply See Admin Instructions    B and C vitamin Complex with folic acid  5 mL Per Feeding Tube Daily    chlorhexidine  15 mL Mouth/Throat Q12H    diltiazem  60 mg Oral or Feeding Tube Q6H NATACHA    hydrALAZINE  10 mg Oral or Feeding Tube Q8H    insulin aspart  1-12 Units Subcutaneous Q4H    levothyroxine  100 mcg Oral or NG Tube QAM AC    pantoprazole  40 mg Per Feeding Tube QAM AC    Or    pantoprazole  40 mg Intravenous QAM AC    piperacillin-tazobactam  2.25 g Intravenous Q6H    polyethylene glycol  17 g Oral or NG Tube Daily    protein modular  1 packet Per Feeding Tube Daily    senna-docusate  1 tablet Oral or NG Tube BID    sodium chloride (PF)  10 mL Intracatheter Q8H    sodium chloride (PF)  3 mL Intracatheter Q8H    vancomycin  1,750 mg Intravenous Once    vancomycin place kumar - receiving intermittent dosing  1 each Intravenous See Admin Instructions       Infusion Meds   dexmedeTOMIDine Stopped (10/31/23 0345)    dextrose      dextrose      HYDROmorphone Stopped (10/27/23 1048)    propofol Stopped (10/30/23 0830)    And    - MEDICATION INSTRUCTIONS -      norepinephrine Stopped (10/27/23 1937)    sodium chloride Stopped (10/31/23 1000)       Allergies   Allergies   Allergen Reactions    Fentanyl Nausea and Vomiting     VERY sensitive to most narcotics if not all.    Morphine And Related Nausea          PHYSICAL EXAMINATION   Temp:  [97.8  F (36.6  C)-99.7  F (37.6  C)] 99.7  F (37.6  C)  Pulse:  [64-98] 82  Resp:  [0-34] 27  BP: ()/(44-68) 142/65  MAP:  [57 mmHg-127 mmHg] 83 mmHg  Arterial Line BP: ()/()  140/60  FiO2 (%):  [30 %-60 %] 50 %  SpO2:  [91 %-99 %] 94 %    Neurologic - Not currently receiving sedation  Mental Status:  lethargic - opens eyes to verbal stimuli, not following commands   Cranial Nerves:  PERRL, dysconjugate gaze, did not track examiner, hearing not formally tested but intact to conversation  Motor:  normal muscle tone and bulk, no abnormal movements noted, purposeful antigravity movement in LUE, purposeful movement with gravity eliminated in LLE, no movement noted in RUE or RLE  Reflexes:   right toes mute   Sensory:   withdraws from noxious stimuli in LUE and LLE, triple flex RLE, grimaces with noxious stimuli in RUE  Coordination:   unable to test due to encephalopathy  Station/Gait:  deferred    Stroke Scales    NIHSS  1a. Level of Consciousness 2-->Not alert, requires repeated stimulation to attend, or is obtunded and requires strong or painful stimulation to make movements (not stereotyped)   1b. LOC Questions 2-->Answers neither question correctly   1c. LOC Commands 2-->Performs neither task correctly   2.   Best Gaze 1-->Partial gaze palsy, gaze is abnormal in one or both eyes, but forced deviation or total gaze paresis is not present   3.   Visual 0-->No visual loss   4.   Facial Palsy 0-->Normal symmetrical movements   5a. Motor Arm, Left 2-->Some effort against gravity, limb cannot get to or maintain (if cued) 90 (or 45) degrees, drifts down to bed, but has some effort against gravity   5b. Motor Arm, Right 4-->No movement   6a. Motor Leg, Left 3-->No effort against gravity, leg falls to bed immediately   6b. Motor Leg, right 4-->No movement   7.   Limb Ataxia 0-->Absent   8.   Sensory 1-->Mild-to-moderate sensory loss, patient feels pinprick is less sharp or is dull on the affected side, or there is a loss of superficial pain with pinprick, but patient is aware of being touched   9.   Best Language 3-->Mute, global aphasia, no usable speech or auditory comprehension   10.  Dysarthria (UN) Intubated or other physical barrier   11. Extinction and Inattention  0-->No abnormality   Total 24 (10/31/23 1223)       Imaging  I personally reviewed all imaging; relevant findings per HPI.    Labs Data   CBC  Recent Labs   Lab 10/31/23  0409 10/30/23  0434 10/29/23  0414   WBC 5.1 8.6 7.4   RBC 2.45* 2.73* 2.89*   HGB 7.1* 8.0* 8.2*   HCT 21.6* 24.4* 25.4*   PLT 96* 86* 58*     Basic Metabolic Panel   Recent Labs   Lab 10/31/23  0801 10/31/23  0412 10/31/23  0409 10/30/23  0810 10/30/23  0434 10/29/23  0612 10/29/23  0414   NA  --   --  140  --  144  --  146*   POTASSIUM  --   --  4.4  --  5.1  --  4.8   CHLORIDE  --   --  102  --  108*  --  112*   CO2  --   --  20*  --  15*  --  15*   BUN  --   --  82.6*  --  105.4*  --  78.7*   CR  --   --  5.75*  --  8.30*  --  7.34*   * 154* 177*   < > 141*   < > 122*   TERESA  --   --  7.2*  --  7.4*  --  7.3*    < > = values in this interval not displayed.     Liver Panel  Recent Labs   Lab 10/31/23  0409 10/30/23  0434 10/29/23  0414   PROTTOTAL 4.2* 4.5* 4.4*   ALBUMIN 2.6* 2.5* 2.6*   BILITOTAL 0.3 0.3 0.3   ALKPHOS 89 82 66   AST 73* 207* 741*   ALT 47 73* 113*     INR    Recent Labs   Lab Test 10/27/23  1022 10/26/23  0414 10/25/23  2114   INR 1.57* 1.58* 1.73*           Stroke Consult Data Data   This was a non-emergent, non-telestroke consult.  I personally examined and evaluated the patient today. At the time of my evaluation and management the patient was in critical condition today due to encephalopathy. I personally managed review of labs and images and neuro exam. Key decisions made today included: eeg and CT. I spent a total of 40 minutes providing critical care services, evaluating the patient, directing care and reviewing laboratory values and radiologic reports.

## 2023-10-31 NOTE — PROGRESS NOTES
[facility] ICU RESPIRATORY NOTE        Date of Admission: 10/25/2023     Date of Intubation (most recent): 10/25/2023    Reason for Mechanical Ventilation: Airway protection     Number of Days on Mechanical Ventilation: 7    Met Criteria for Spontaneous Breathing Trial: No    Reason for No Spontaneous Breathing Trial: Per MD     Significant Events Today: None     ABG Results:   Recent Labs   Lab 10/31/23  0044 10/30/23  1339 10/28/23  0425 10/27/23  2042   PH 7.42 7.51* 7.34* 7.28*   PCO2 33* 27* 32* 36   PO2 92 68* 100 110*   HCO3 21 21 17* 17*   O2PER 50 30 40 45         Current Vent Settings: Vent Mode: CMV/AC  (Continuous Mandatory Ventilation/ Assist Control)  FiO2 (%): 50 %  Resp Rate (Set): 25 breaths/min  Tidal Volume (Set, mL): 400 mL  PEEP (cm H2O): 5 cmH2O  Pressure Support (cm H2O): 5 cmH2O  Resp: 18      Skin Assessment: Skin intact     Plan: Continue full vent support and wean as tolerated    RT Royal on 10/31/2023 at 5:27 PM

## 2023-10-31 NOTE — PROGRESS NOTES
Patient was transported to CT on the transport vent. Pt tolerated the transport well.  10/31/2023  Rhina Hines, RT

## 2023-10-31 NOTE — PROGRESS NOTES
Critical Care  Note      10/31/2023    Name: Mya Hui MRN#: [id number]   Age: 83 year old YOB: 1940     Hsptl Day# 6  ICU DAY #    MV DAY #             Problem List:   Principal Problem:    S/P lumbar spinal fusion  Active Problems:    Acute kidney failure with tubular necrosis (H)           Summary/Hospital Course:       Interval/overnight events:  - Was on Precedex drip for a brief amount of time overnight.  - Received 25 mcg of fentanyl.  - Continues on Lasix drip overnight.  - Having increased thick secretions through the endotracheal tube.  - Underwent HD session yesterday.  - Continues to be encephalopathic.  Grimaces on sternal rub, only moves her right upper extremities.      Assessment and plan :     Mya Hui IS a 83 year old female admitted on 10/25/2023 for hemorrhagic shock post lumbar spinal fusion surgery along with LISA and encephalopathy.      I have personally reviewed the daily labs, imaging studies, cultures and discussed the case with referring physician and consulting physicians.      My assessment and plan by system for this patient is as follows:     #S/p lumbar spinal fusion, with prior radicular symptoms and scoliosis  For lumbar scoliosis and radiculopathy  #Hemorrhagic shock  Postprocedure.  Received multiple blood products, remains off vasopressors.  #Possible MI  ST changes noted intraoperatively. Minimal troponin elevation.  Echocardiogram did not show any significant abnormality.  #Acute hypoxic respiratory failure  Remains intubated postoperatively.  Current obstacle to extubation is her mental status.  On minimal vent settings with 30% FiO2, 5 of PEEP and respiratory rate of 25.  #LISA  Nephrology following.  Likely ATN from previous hemorrhagic shock.  Urine output improving.  Will initiate HD today.  On Lasix drip.  #Encephalopathy  CT head negative.  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  #Possible pneumonia  Increased thick  secretions from the ET tube with elevated procalcitonin  #HTN  #DM  #HL  #Obesity  #History of hypothyroid and normal TSH  #History of CKD  #History of lower extremity edema  #GERD     Plan:  - Continue to monitor CBC daily.  Has been off vasopressors.  -Transfuse to keep hemoglobin above 8 with concerns of cardiac ischemia intraoperatively.  -Continue hemodialysis.  Appreciate nephrology recommendations.  BUN/creatinine improving.  - Neurology consulted for persistent encephalopathy.  Will discuss with neurology about repeating neuroimaging.  - Check ammonia, TSH and procalcitonin.  - Initiate broad-spectrum antibiotic including vancomycin and Zosyn for possible pneumonia and sepsis.  Check CT chest/abdomen and pelvis  - Minimize further sedative/psychotropic meds.  -Continue enteral nutrition.      IV/Access:   1. Venous access - midline consult placed today, right IJ HD catheter  2. Arterial access -A-line can be removed.  3.  Plan  - central access required and necessary        ICU Prophylaxis:   1. DVT: Hep Subq/ LMWH/mechanical  2. VAP: HOB 30 degrees, chlorhexidine rinse  3. Stress Ulcer: PPI/H2 blocker  4. Restraints: Nonviolent soft two point restraints required and necessary for patient safety and continued cares and good effect as patient continues to pull at necessary lines, tubes despite education and distraction. Will readdress daily.   5. Wound care  -routine wound care post surgery  6. Feeding -enteral nutrition  7. Family Update: Updated daughter April and her son with daughter-in-law at the bedside.  8. Disposition -was seen in the ICU today for need for mechanical ventilation and HD    Clinically Significant Risk Factors             # Anion Gap Metabolic Acidosis: Highest Anion Gap = 21 mmol/L in last 2 days, will monitor and treat as appropriate  # Hypoalbuminemia: Lowest albumin = 2.5 g/dL at 10/30/2023  4:34 AM, will monitor as appropriate   # Thrombocytopenia: Lowest platelets = 86 in last 2  "days, will monitor for bleeding   # Hypertension: Noted on problem list        # Obesity: Estimated body mass index is 38.17 kg/m  as calculated from the following:    Height as of this encounter: 1.651 m (5' 5\").    Weight as of this encounter: 104.1 kg (229 lb 6.4 oz).   # Moderate Malnutrition: based on nutrition assessment    # Financial/Environmental Concerns: none                             Key Medications:      - MEDICATION INSTRUCTIONS for Dialysis Patients -   Does not apply See Admin Instructions    B and C vitamin Complex with folic acid  5 mL Per Feeding Tube Daily    ceFAZolin  1 g Intravenous Q24H    chlorhexidine  15 mL Mouth/Throat Q12H    diltiazem  60 mg Oral or Feeding Tube Q6H NATACHA    hydrALAZINE  10 mg Oral or Feeding Tube Q8H    insulin aspart  1-12 Units Subcutaneous Q4H    levothyroxine  100 mcg Oral or NG Tube QAM AC    pantoprazole  40 mg Per Feeding Tube QAM AC    Or    pantoprazole  40 mg Intravenous QAM AC    polyethylene glycol  17 g Oral or NG Tube Daily    protein modular  1 packet Per Feeding Tube Daily    senna-docusate  1 tablet Oral or NG Tube BID    sodium chloride (PF)  3 mL Intracatheter Q8H      dexmedeTOMIDine Stopped (10/31/23 0345)    dextrose      dextrose      furosemide (LASIX) 100 mg in sodium chloride 0.9 % 100 mL infusion 20 mg/hr (10/31/23 0821)    HYDROmorphone Stopped (10/27/23 1048)    propofol Stopped (10/30/23 0830)    And    - MEDICATION INSTRUCTIONS -      norepinephrine Stopped (10/27/23 1937)    sodium chloride 10 mL/hr at 10/31/23 0822              Physical Examination:   Temp:  [97.8  F (36.6  C)-99.6  F (37.6  C)] 99.5  F (37.5  C)  Pulse:  [64-98] 73  Resp:  [0-34] 25  BP: ()/(44-68) 123/49  MAP:  [57 mmHg-102 mmHg] 61 mmHg  Arterial Line BP: ()/(17-70) 118/42  FiO2 (%):  [30 %-60 %] 60 %  SpO2:  [91 %-98 %] 95 %    Intake/Output Summary (Last 24 hours) at 10/31/2023 0924  Last data filed at 10/31/2023 0800  Gross per 24 hour   Intake 1751.2 " ml   Output 2620 ml   Net -868.8 ml     Wt Readings from Last 4 Encounters:   10/31/23 104.1 kg (229 lb 6.4 oz)   10/19/23 95 kg (209 lb 8 oz)   09/28/23 97.1 kg (214 lb)   09/14/23 97.1 kg (214 lb)     Arterial Line BP: ()/(17-70) 118/42  MAP:  [57 mmHg-102 mmHg] 61 mmHg  BP - Mean:  [52-69] 69  Vent Mode: CMV/AC  (Continuous Mandatory Ventilation/ Assist Control)  FiO2 (%): 60 %  Resp Rate (Set): 25 breaths/min  Tidal Volume (Set, mL): 400 mL  PEEP (cm H2O): 5 cmH2O  Pressure Support (cm H2O): 5 cmH2O  Resp: 25    Recent Labs   Lab 10/31/23  0044 10/30/23  1339 10/28/23  0425 10/27/23  2042   PH 7.42 7.51* 7.34* 7.28*   PCO2 33* 27* 32* 36   PO2 92 68* 100 110*   HCO3 21 21 17* 17*   O2PER 50 30 40 45       GEN: no acute distress   HEENT: head ncat, sclera anicteric, OP patent, trachea midline   PULM: unlabored synchronous with vent, clear anteriorly    CV/COR: RRR S1S2 no gallop,  No rub, no murmur  ABD: soft nontender, hypoactive bowel sounds, no mass  EXT:  warm and well perfused x4  NEURO: PERRL, no obvious deficits  SKIN: no obvious rash  LINES: clean, dry intact         Data:   All data and imaging reviewed     ROUTINE ICU LABS (Last four results)  CMP  Recent Labs   Lab 10/31/23  0801 10/31/23  0412 10/31/23  0409 10/31/23  0017 10/30/23  0810 10/30/23  0434 10/29/23  0612 10/29/23  0414 10/28/23  0431 10/28/23  0426   NA  --   --  140  --   --  144  --  146*  --  145   POTASSIUM  --   --  4.4  --   --  5.1  --  4.8  --  4.8   CHLORIDE  --   --  102  --   --  108*  --  112*  --  112*   CO2  --   --  20*  --   --  15*  --  15*  --  16*   ANIONGAP  --   --  18*  --   --  21*  --  19*  --  17*   * 154* 177* 123*   < > 141*   < > 122*   < > 131*   BUN  --   --  82.6*  --   --  105.4*  --  78.7*  --  54.6*   CR  --   --  5.75*  --   --  8.30*  --  7.34*  --  6.02*   GFRESTIMATED  --   --  7*  --   --  4*  --  5*  --  7*   TERESA  --   --  7.2*  --   --  7.4*  --  7.3*  --  7.4*   MAG  --   --  2.1  --   " --  2.8*  --  2.7*  --  2.3   PHOS  --   --  5.8*  --   --  6.6*  --  5.9*  --  4.7*   PROTTOTAL  --   --  4.2*  --   --  4.5*  --  4.4*  --  4.0*   ALBUMIN  --   --  2.6*  --   --  2.5*  --  2.6*  --  2.5*   BILITOTAL  --   --  0.3  --   --  0.3  --  0.3  --  0.4   ALKPHOS  --   --  89  --   --  82  --  66  --  51   AST  --   --  73*  --   --  207*  --  741*  --  1,913*   ALT  --   --  47  --   --  73*  --  113*  --  171*    < > = values in this interval not displayed.     CBC  Recent Labs   Lab 10/31/23  0409 10/30/23  0434 10/29/23  0414 10/28/23  1810 10/28/23  1218 10/28/23  0426   WBC 5.1 8.6 7.4  --   --  8.3   RBC 2.45* 2.73* 2.89*  --   --  2.91*   HGB 7.1* 8.0* 8.2* 8.3*   < > 8.5*  8.5*   HCT 21.6* 24.4* 25.4*  --   --  25.6*   MCV 88 89 88  --   --  88   MCH 29.0 29.3 28.4  --   --  29.2   MCHC 32.9 32.8 32.3  --   --  33.2   RDW 17.5* 17.5* 17.4*  --   --  17.4*   PLT 96* 86* 58*  --   --  52*    < > = values in this interval not displayed.     INR  Recent Labs   Lab 10/27/23  1022 10/26/23  0414 10/25/23  2114 10/25/23  1629   INR 1.57* 1.58* 1.73* 2.11*     Arterial Blood Gas  Recent Labs   Lab 10/31/23  0044 10/30/23  1339 10/28/23  0425 10/27/23  2042   PH 7.42 7.51* 7.34* 7.28*   PCO2 33* 27* 32* 36   PO2 92 68* 100 110*   HCO3 21 21 17* 17*   O2PER 50 30 40 45       All cultures:  No results for input(s): \"CULT\" in the last 168 hours.  Recent Results (from the past 24 hour(s))   IR CVC Tunnel Placement > 5 Yrs of Age    Narrative    TUNNELED CENTRAL VENOUS CATHETER PLACEMENT 10/30/2023 11:32 AM    HISTORY:  Patient requires dialysis.    DESCRIPTION/FINDINGS: After obtaining informed consent, the patient  was placed in a supine position on the fluoroscopy table. The neck and  chest on the side of planned catheter placement were prepped and  draped in the usual sterile manner. 1% lidocaine was injected for  local anesthesia.    Ultrasound was used to evaluate and document patency of the " right  internal jugular vein. Under sterile ultrasound guidance, a puncture  was made into the internal jugular vein. A permanent copy image was  obtained for the patient's records.     A peel-away sheath of appropriate size was placed. A small skin  incision was made in the anterior chest. A tunnel was created from the  incision to the IJ puncture site. The central venous catheter was  pulled through the tunnel and placed into the peel-away sheath. The  tip of the catheter was placed in the right atrium. The peel-away  sheath was peeled away.    The neck incision was closed with Dermabond. The catheter was sutured  to the patient's skin at the skin entry site. All ports were  aspirated, flushed with saline, and heparin locked.    A fluoroscopic image was saved to document tip of catheter in  satisfactory position.     The patient was independently monitored by a registered nurse assigned  to the Department of radiology using automated blood pressure, EKG and  pulse oximetry. The patient tolerated the procedure well. There were  no immediate postprocedure complications. The patient's vital signs  were monitored by radiology nursing staff under my supervision and  remained stable throughout the study. Radiation dose for this scan was  reduced using automated exposure control, adjustment of the mA and/or  kV according to patient size, or iterative reconstruction technique.    MEDICATIONS: None    SEDATION TIME: None    FLUOROSCOPY TIME: 0.4 minutes    FLUOROSCOPIC DOSE: 2.77    NUMBER OF FLUOROSCOPIC IMAGES: 1      Impression    IMPRESSION:   14.5 Mexican tunneled central venous catheter placed.    Maximal Sterile Barrier Technique Utilized: Cap AND mask AND sterile  gown AND sterile gloves AND sterile full body drape AND hand hygiene  AND skin preparation 2% chlorhexidine for cutaneous antisepsis (or  acceptable alternative antiseptics).     Sterile Ultrasound Technique Utilized ?Sterile gel AND sterile  probe  covers.    KALEB MICHEL MD         SYSTEM ID:  [id number]         Billing: This patient is critically ill: Yes. Total critical care time today 60 min, excluding procedures.                         LITO Werner   of Medicine  [facility]  Pulmonary, Allergy, Critical Care and Sleep Medicine  Pager - [phone]

## 2023-10-31 NOTE — PROGRESS NOTES
"[facility]    Neurosurgery  Daily Note    Assessment & Plan   Procedure(s):  Thoracic 11 to pelvis robotic decompression and fusion   6 Days Post-Op    AM ROUNDS: Intubated and sedated. ELY drains intact: L 35mL/8hrs overnight; R 40mL/8hrs overnight. Dressing in place and CDI. Neuro exam stable.    Plan:  -Ms. Hui MAY have a MRI. Spoke to Ms. Fernanda Serrano, Stroke Neurology, to clarify this. Per her note, \"unable to get MRI d/t new spine hardware, will get non-contrast head CT\".   -Defer medical cares to intensivist   -Minimize sedation as able to allow for most accurate exam. Consider Precedex gtt or PRN medications for vent compliance.   -Pain management as needed  -Routine wound care  -Continue drains  -Continue antibiotics    Suki Harris DNP Student  [facility] Neurosurgery     Interval History   Stable.     Physical Exam   Temp: 98.4  F (36.9  C) Temp src: Axillary BP: (!) 142/65 Pulse: 82   Resp: 27 SpO2: 94 % O2 Device: Mechanical Ventilator    Vitals:    10/29/23 0502 10/30/23 0600 10/31/23 0400   Weight: 111.4 kg (245 lb 11.2 oz) 107.8 kg (237 lb 9.6 oz) 104.1 kg (229 lb 6.4 oz)     Vital Signs with Ranges  Temp:  [97.8  F (36.6  C)-99.7  F (37.6  C)] 98.4  F (36.9  C)  Pulse:  [64-98] 82  Resp:  [0-34] 27  BP: ()/(44-68) 142/65  MAP:  [57 mmHg-127 mmHg] 83 mmHg  Arterial Line BP: ()/() 140/60  FiO2 (%):  [30 %-60 %] 50 %  SpO2:  [91 %-99 %] 94 %  I/O last 3 completed shifts:  In: 1829.2 [I.V.:804.2; Other:100; NG/GT:495]  Out: 2700 [Urine:545; Drains:155; Other:2000]    Intubated and sedated. Opens eyes to verbal cues and painful stimuli.  Pupils equal, sluggish but reactive  Moves BLE to painful stimuli. BUE no movement to painful stimuli. Per nurse, moves LUE > RUE.     Incision: CDI with stapled intact and dressing in place. No erythema or ecchymosis. ELY drains x 2 intact with mild amount of serosanguinous drainage.     Medications    " dexmedeTOMIDine Stopped (10/31/23 0345)    dextrose      dextrose      HYDROmorphone Stopped (10/27/23 1048)    propofol Stopped (10/30/23 0830)    And    - MEDICATION INSTRUCTIONS -      norepinephrine Stopped (10/27/23 1937)    sodium chloride Stopped (10/31/23 1000)       - MEDICATION INSTRUCTIONS for Dialysis Patients -   Does not apply See Admin Instructions    B and C vitamin Complex with folic acid  5 mL Per Feeding Tube Daily    chlorhexidine  15 mL Mouth/Throat Q12H    diltiazem  60 mg Oral or Feeding Tube Q6H NATACHA    hydrALAZINE  10 mg Oral or Feeding Tube Q8H    insulin aspart  1-12 Units Subcutaneous Q4H    iopamidol (ISOVUE-370)  115 mL Intravenous Once    levothyroxine  100 mcg Oral or NG Tube QAM AC    pantoprazole  40 mg Per Feeding Tube QAM AC    Or    pantoprazole  40 mg Intravenous QAM AC    piperacillin-tazobactam  2.25 g Intravenous Q6H    polyethylene glycol  17 g Oral or NG Tube Daily    protein modular  1 packet Per Feeding Tube Daily    sodium chloride 0.9 %  79 mL Intravenous Once    senna-docusate  1 tablet Oral or NG Tube BID    sodium chloride (PF)  10 mL Intracatheter Q8H    sodium chloride (PF)  3 mL Intracatheter Q8H    vancomycin  1,750 mg Intravenous Once    vancomycin place kumar - receiving intermittent dosing  1 each Intravenous See Admin Instructions     Plans discussed with Cliff Rai PA-C who was in agreement with plans

## 2023-10-31 NOTE — PROGRESS NOTES
Per nephrology, not to use HD line for medication or contrast, OK to use for lab draws.     Waiting on IV team for midline in order to give antibiotics or other medications, and contrast for STAT CT order. No IV access at this time. Waiting to give 1 unit PRBC

## 2023-10-31 NOTE — PHARMACY-VANCOMYCIN DOSING SERVICE
"Pharmacy Vancomycin Initial Note  Date of Service 2023  Patient's  1940  83 year old, female    Indication: Sepsis    Current estimated CrCl = Estimated Creatinine Clearance: 9.2 mL/min (A) (based on SCr of 5.75 mg/dL (H)).    Creatinine for last 3 days  10/29/2023:  4:14 AM Creatinine 7.34 mg/dL  10/30/2023:  4:34 AM Creatinine 8.30 mg/dL  10/31/2023:  4:09 AM Creatinine 5.75 mg/dL    Recent Vancomycin Level(s) for last 3 days  No results found for requested labs within last 3 days.      Vancomycin IV Administrations (past 72 hours)        No vancomycin orders with administrations in past 72 hours.                    Nephrotoxins and other renal medications (From now, onward)      Start     Dose/Rate Route Frequency Ordered Stop    10/31/23 1030  vancomycin (VANCOCIN) 1,750 mg in 0.9% NaCl 500 mL intermittent infusion         1,750 mg  over 2 Hours Intravenous ONCE 10/31/23 1001      10/31/23 1000  vancomycin place kumar - receiving intermittent dosing         1 each Intravenous SEE ADMIN INSTRUCTIONS 10/31/23 1000      10/31/23 0930  piperacillin-tazobactam (ZOSYN) 2.25 g vial to attach to  ml bag        Note to Pharmacy: For [facility], [facility] and [facility]: For Zosyn-naive patients, use the \"Zosyn initial dose + extended infusion\" order panel.    2.25 g  over 30 Minutes Intravenous EVERY 6 HOURS 10/31/23 0929      10/27/23 1130  norepinephrine (LEVOPHED) 16 mg in  mL infusion MAX CONC CENTRAL LINE         0.01-0.6 mcg/kg/min × 94.8 kg  0.9-53.3 mL/hr  Intravenous CONTINUOUS 10/27/23 1113              Contrast Orders - past 72 hours (72h ago, onward)      Start     Dose/Rate Route Frequency Stop    10/31/23 1330  iopamidol (ISOVUE-370) solution 115 mL         115 mL Intravenous ONCE                    Plan:  Start vancomycin  1750 mg IV x 1 dose and then dose per dialysis protocol  Vancomycin monitoring method: Trough (Method 2 = manual dose calculation)  Vancomycin therapeutic monitoring goal: " 15-20 mg/L  Pharmacy will check vancomycin levels as appropriate in 1-3 Days.    Serum creatinine levels will be ordered  daily x 7 days or per MD per dialysis protocol .      Tracy Pereira RPH

## 2023-11-01 ENCOUNTER — HOSPITAL ENCOUNTER (INPATIENT)
Dept: NEUROLOGY | Facility: CLINIC | Age: 83
Discharge: HOME | DRG: 453 | End: 2023-11-01
Attending: NURSE PRACTITIONER | Admitting: NEUROLOGICAL SURGERY
Payer: COMMERCIAL

## 2023-11-01 ENCOUNTER — APPOINTMENT (OUTPATIENT)
Dept: CT IMAGING | Facility: CLINIC | Age: 83
DRG: 453 | End: 2023-11-01
Attending: ANESTHESIOLOGY
Payer: COMMERCIAL

## 2023-11-01 PROBLEM — G93.40 ENCEPHALOPATHY: Status: ACTIVE | Noted: 2023-11-01

## 2023-11-01 LAB
ALBUMIN SERPL BCG-MCNC: 2.5 G/DL (ref 3.5–5.2)
ALP SERPL-CCNC: 83 U/L (ref 35–104)
ALT SERPL W P-5'-P-CCNC: 36 U/L (ref 0–50)
ANION GAP SERPL CALCULATED.3IONS-SCNC: 21 MMOL/L (ref 7–15)
AST SERPL W P-5'-P-CCNC: 44 U/L (ref 0–45)
BILIRUB DIRECT SERPL-MCNC: 0.27 MG/DL (ref 0–0.3)
BILIRUB SERPL-MCNC: 0.4 MG/DL
BUN SERPL-MCNC: 106.7 MG/DL (ref 8–23)
CALCIUM SERPL-MCNC: 7.4 MG/DL (ref 8.8–10.2)
CHLORIDE SERPL-SCNC: 100 MMOL/L (ref 98–107)
CREAT SERPL-MCNC: 6.72 MG/DL (ref 0.51–0.95)
DEPRECATED HCO3 PLAS-SCNC: 18 MMOL/L (ref 22–29)
EGFRCR SERPLBLD CKD-EPI 2021: 6 ML/MIN/1.73M2
ERYTHROCYTE [DISTWIDTH] IN BLOOD BY AUTOMATED COUNT: 17.2 % (ref 10–15)
GLUCOSE BLDC GLUCOMTR-MCNC: 102 MG/DL (ref 70–99)
GLUCOSE BLDC GLUCOMTR-MCNC: 107 MG/DL (ref 70–99)
GLUCOSE BLDC GLUCOMTR-MCNC: 109 MG/DL (ref 70–99)
GLUCOSE BLDC GLUCOMTR-MCNC: 114 MG/DL (ref 70–99)
GLUCOSE BLDC GLUCOMTR-MCNC: 123 MG/DL (ref 70–99)
GLUCOSE BLDC GLUCOMTR-MCNC: 94 MG/DL (ref 70–99)
GLUCOSE SERPL-MCNC: 116 MG/DL (ref 70–99)
HCT VFR BLD AUTO: 24.4 % (ref 35–47)
HGB BLD-MCNC: 8.1 G/DL (ref 11.7–15.7)
MAGNESIUM SERPL-MCNC: 2.3 MG/DL (ref 1.7–2.3)
MCH RBC QN AUTO: 29.2 PG (ref 26.5–33)
MCHC RBC AUTO-ENTMCNC: 33.2 G/DL (ref 31.5–36.5)
MCV RBC AUTO: 88 FL (ref 78–100)
PHOSPHATE SERPL-MCNC: 7 MG/DL (ref 2.5–4.5)
PLATELET # BLD AUTO: 117 10E3/UL (ref 150–450)
POTASSIUM SERPL-SCNC: 4.8 MMOL/L (ref 3.4–5.3)
PROT SERPL-MCNC: 4.5 G/DL (ref 6.4–8.3)
RBC # BLD AUTO: 2.77 10E6/UL (ref 3.8–5.2)
SODIUM SERPL-SCNC: 139 MMOL/L (ref 135–145)
VANCOMYCIN SERPL-MCNC: 19.2 UG/ML
WBC # BLD AUTO: 10.9 10E3/UL (ref 4–11)

## 2023-11-01 PROCEDURE — 90935 HEMODIALYSIS ONE EVALUATION: CPT | Performed by: STUDENT IN AN ORGANIZED HEALTH CARE EDUCATION/TRAINING PROGRAM

## 2023-11-01 PROCEDURE — 93005 ELECTROCARDIOGRAM TRACING: CPT

## 2023-11-01 PROCEDURE — 93010 ELECTROCARDIOGRAM REPORT: CPT | Performed by: INTERNAL MEDICINE

## 2023-11-01 PROCEDURE — P9047 ALBUMIN (HUMAN), 25%, 50ML: HCPCS | Performed by: STUDENT IN AN ORGANIZED HEALTH CARE EDUCATION/TRAINING PROGRAM

## 2023-11-01 PROCEDURE — 80053 COMPREHEN METABOLIC PANEL: CPT | Performed by: INTERNAL MEDICINE

## 2023-11-01 PROCEDURE — 250N000011 HC RX IP 250 OP 636: Mod: JZ | Performed by: INTERNAL MEDICINE

## 2023-11-01 PROCEDURE — 258N000003 HC RX IP 258 OP 636: Performed by: STUDENT IN AN ORGANIZED HEALTH CARE EDUCATION/TRAINING PROGRAM

## 2023-11-01 PROCEDURE — 70450 CT HEAD/BRAIN W/O DYE: CPT

## 2023-11-01 PROCEDURE — 99291 CRITICAL CARE FIRST HOUR: CPT | Performed by: INTERNAL MEDICINE

## 2023-11-01 PROCEDURE — 999N000052 EEG VIDEO 12-26 HR UNMONITORED

## 2023-11-01 PROCEDURE — 90935 HEMODIALYSIS ONE EVALUATION: CPT

## 2023-11-01 PROCEDURE — 99207 EEG VIDEO 2-12 HRS UNMONITORED: CPT | Performed by: PSYCHIATRY & NEUROLOGY

## 2023-11-01 PROCEDURE — 634N000001 HC RX 634: Mod: JZ | Performed by: STUDENT IN AN ORGANIZED HEALTH CARE EDUCATION/TRAINING PROGRAM

## 2023-11-01 PROCEDURE — 200N000001 HC R&B ICU

## 2023-11-01 PROCEDURE — 999N000185 HC STATISTIC TRANSPORT TIME EA 15 MIN

## 2023-11-01 PROCEDURE — 999N000157 HC STATISTIC RCP TIME EA 10 MIN

## 2023-11-01 PROCEDURE — 999N000009 HC STATISTIC AIRWAY CARE

## 2023-11-01 PROCEDURE — C9113 INJ PANTOPRAZOLE SODIUM, VIA: HCPCS | Mod: JZ | Performed by: INTERNAL MEDICINE

## 2023-11-01 PROCEDURE — 250N000011 HC RX IP 250 OP 636: Mod: JZ | Performed by: STUDENT IN AN ORGANIZED HEALTH CARE EDUCATION/TRAINING PROGRAM

## 2023-11-01 PROCEDURE — 250N000013 HC RX MED GY IP 250 OP 250 PS 637: Performed by: INTERNAL MEDICINE

## 2023-11-01 PROCEDURE — P9045 ALBUMIN (HUMAN), 5%, 250 ML: HCPCS | Mod: JZ | Performed by: STUDENT IN AN ORGANIZED HEALTH CARE EDUCATION/TRAINING PROGRAM

## 2023-11-01 PROCEDURE — 84100 ASSAY OF PHOSPHORUS: CPT | Performed by: INTERNAL MEDICINE

## 2023-11-01 PROCEDURE — 999N000253 HC STATISTIC WEANING TRIALS

## 2023-11-01 PROCEDURE — 80202 ASSAY OF VANCOMYCIN: CPT | Performed by: NEUROLOGICAL SURGERY

## 2023-11-01 PROCEDURE — 95718 EEG PHYS/QHP 2-12 HR W/VEEG: CPT | Performed by: PSYCHIATRY & NEUROLOGY

## 2023-11-01 PROCEDURE — 99207 PR APP CREDIT; MD BILLING SHARED VISIT: CPT | Performed by: NURSE PRACTITIONER

## 2023-11-01 PROCEDURE — 99232 SBSQ HOSP IP/OBS MODERATE 35: CPT | Mod: FS | Performed by: NURSE PRACTITIONER

## 2023-11-01 PROCEDURE — 94003 VENT MGMT INPAT SUBQ DAY: CPT

## 2023-11-01 PROCEDURE — 250N000013 HC RX MED GY IP 250 OP 250 PS 637: Performed by: NURSE PRACTITIONER

## 2023-11-01 PROCEDURE — 95711 VEEG 2-12 HR UNMONITORED: CPT

## 2023-11-01 PROCEDURE — 85027 COMPLETE CBC AUTOMATED: CPT | Performed by: INTERNAL MEDICINE

## 2023-11-01 PROCEDURE — 83735 ASSAY OF MAGNESIUM: CPT | Performed by: INTERNAL MEDICINE

## 2023-11-01 PROCEDURE — 82248 BILIRUBIN DIRECT: CPT | Performed by: INTERNAL MEDICINE

## 2023-11-01 RX ORDER — VANCOMYCIN HYDROCHLORIDE 1 G/200ML
1000 INJECTION, SOLUTION INTRAVENOUS ONCE
Status: COMPLETED | OUTPATIENT
Start: 2023-11-01 | End: 2023-11-01

## 2023-11-01 RX ORDER — ALBUMIN (HUMAN) 12.5 G/50ML
50 SOLUTION INTRAVENOUS
Status: DISCONTINUED | OUTPATIENT
Start: 2023-11-01 | End: 2023-11-01

## 2023-11-01 RX ORDER — MEROPENEM 500 MG/1
500 INJECTION, POWDER, FOR SOLUTION INTRAVENOUS EVERY 24 HOURS
Status: DISCONTINUED | OUTPATIENT
Start: 2023-11-01 | End: 2023-11-05

## 2023-11-01 RX ORDER — NYSTATIN 100000 U/G
CREAM TOPICAL 2 TIMES DAILY
Status: DISCONTINUED | OUTPATIENT
Start: 2023-11-01 | End: 2023-12-01 | Stop reason: HOSPADM

## 2023-11-01 RX ADMIN — LEVOTHYROXINE SODIUM 100 MCG: 100 TABLET ORAL at 08:10

## 2023-11-01 RX ADMIN — DILTIAZEM HYDROCHLORIDE 60 MG: 60 TABLET, FILM COATED ORAL at 21:19

## 2023-11-01 RX ADMIN — VANCOMYCIN HYDROCHLORIDE 1000 MG: 1 INJECTION, SOLUTION INTRAVENOUS at 13:35

## 2023-11-01 RX ADMIN — PANTOPRAZOLE SODIUM 40 MG: 40 INJECTION, POWDER, FOR SOLUTION INTRAVENOUS at 08:08

## 2023-11-01 RX ADMIN — DILTIAZEM HYDROCHLORIDE 60 MG: 60 TABLET, FILM COATED ORAL at 05:41

## 2023-11-01 RX ADMIN — SODIUM CHLORIDE 300 ML: 9 INJECTION, SOLUTION INTRAVENOUS at 08:00

## 2023-11-01 RX ADMIN — CHLORHEXIDINE GLUCONATE 15 ML: 1.2 RINSE ORAL at 08:10

## 2023-11-01 RX ADMIN — DILTIAZEM HYDROCHLORIDE 60 MG: 60 TABLET, FILM COATED ORAL at 17:05

## 2023-11-01 RX ADMIN — EPOETIN ALFA-EPBX 10000 UNITS: 10000 INJECTION, SOLUTION INTRAVENOUS; SUBCUTANEOUS at 09:21

## 2023-11-01 RX ADMIN — NYSTATIN: 100000 CREAM TOPICAL at 12:43

## 2023-11-01 RX ADMIN — PIPERACILLIN AND TAZOBACTAM 2.25 G: 2; .25 INJECTION, POWDER, FOR SOLUTION INTRAVENOUS at 05:41

## 2023-11-01 RX ADMIN — NYSTATIN: 100000 CREAM TOPICAL at 21:19

## 2023-11-01 RX ADMIN — CHLORHEXIDINE GLUCONATE 15 ML: 1.2 RINSE ORAL at 21:19

## 2023-11-01 RX ADMIN — ACETAMINOPHEN 650 MG: 325 TABLET, FILM COATED ORAL at 17:05

## 2023-11-01 RX ADMIN — ALBUMIN HUMAN 250 ML: 0.05 INJECTION, SOLUTION INTRAVENOUS at 09:28

## 2023-11-01 RX ADMIN — Medication: at 09:18

## 2023-11-01 RX ADMIN — Medication 5 ML: at 12:29

## 2023-11-01 RX ADMIN — HEPARIN SODIUM 1600 UNITS: 1000 INJECTION INTRAVENOUS; SUBCUTANEOUS at 09:21

## 2023-11-01 RX ADMIN — MEROPENEM 500 MG: 500 INJECTION, POWDER, FOR SOLUTION INTRAVENOUS at 12:18

## 2023-11-01 RX ADMIN — HYDRALAZINE HYDROCHLORIDE 10 MG: 10 TABLET ORAL at 05:41

## 2023-11-01 RX ADMIN — ALBUMIN HUMAN 50 ML: 0.25 SOLUTION INTRAVENOUS at 10:11

## 2023-11-01 RX ADMIN — HEPARIN SODIUM 1600 UNITS: 1000 INJECTION INTRAVENOUS; SUBCUTANEOUS at 09:20

## 2023-11-01 RX ADMIN — DILTIAZEM HYDROCHLORIDE 60 MG: 60 TABLET, FILM COATED ORAL at 12:42

## 2023-11-01 RX ADMIN — HYDRALAZINE HYDROCHLORIDE 10 MG: 10 TABLET ORAL at 21:19

## 2023-11-01 RX ADMIN — HYDRALAZINE HYDROCHLORIDE 10 MG: 10 TABLET ORAL at 13:39

## 2023-11-01 ASSESSMENT — ACTIVITIES OF DAILY LIVING (ADL)
ADLS_ACUITY_SCORE: 44
ADLS_ACUITY_SCORE: 48
ADLS_ACUITY_SCORE: 44
ADLS_ACUITY_SCORE: 48

## 2023-11-01 NOTE — PLAN OF CARE
Neuro: MRI complete.Afebrile. Reponds to pain in all extrem. Patient int following commands, blinks to threat, slight squeeze to L hand. Minimal movement on R arm. Moves BLE, wiggles toes on commands, PERRLA. Purposeful with L arm to ETT. Restraints applied  Cardiac: NSR 70s, Normotensive, Edema +2-+3, Pulses +2, A line in place for BP reading and labs. Cuff now correlating with aileen.   Resp: Thick inline secretions. Deep subglottic seccretions-brown/yellow. Course  Gi/: Decreased urine output, MD aware. Loose smear BM, OG clamped  Skin: weeping, L arm open blister/covered, R arm discoloration, genereal bruising, Dressing intact in back, ELY sites CDI, interdy applied under breasts/pannus fold    Plan: Get out of bed, Pull sykes/A line, continue EEG?

## 2023-11-01 NOTE — PROGRESS NOTES
Potassium   Date Value Ref Range Status   11/01/2023 4.8 3.4 - 5.3 mmol/L Final   05/13/2022 4.5 3.4 - 5.3 mmol/L Final   01/06/2021 4.3 3.4 - 5.3 mmol/L Final     Potassium POCT   Date Value Ref Range Status   10/25/2023 4.7 3.4 - 5.3 mmol/L Final     Comment:     000     Hemoglobin   Date Value Ref Range Status   11/01/2023 8.1 (L) 11.7 - 15.7 g/dL Final   09/23/2020 13.1 11.7 - 15.7 g/dL Final     Creatinine   Date Value Ref Range Status   11/01/2023 6.72 (H) 0.51 - 0.95 mg/dL Final   01/06/2021 1.04 0.52 - 1.04 mg/dL Final     Urea Nitrogen   Date Value Ref Range Status   11/01/2023 106.7 (H) 8.0 - 23.0 mg/dL Final   05/13/2022 17 7 - 30 mg/dL Final   01/06/2021 21 7 - 30 mg/dL Final     Sodium   Date Value Ref Range Status   11/01/2023 139 135 - 145 mmol/L Final     Comment:     Reference intervals for this test were updated on 09/26/2023 to more accurately reflect our healthy population. There may be differences in the flagging of prior results with similar values performed with this method. Interpretation of those prior results can be made in the context of the updated reference intervals.    01/06/2021 144 133 - 144 mmol/L Final     INR   Date Value Ref Range Status   10/27/2023 1.57 (H) 0.85 - 1.15 Final       DIALYSIS PROCEDURE NOTE  Hepatitis status of previous patient on machine log was checked and verified ok to use with this patients hepatitis status.  Patient dialyzed for 3 hrs. on a K2 bath with a net fluid removal of 3.5L.  A BFR of 400ml/min was obtained via a  tunnelled Right internal jugular CVC.      The treatment plan was discussed with Dr. Chacon during the treatment.    Total heparin received during the treatment: 0 units.      Line flushed, clamped and capped with heparin 1:1000 1.6 mL (1600 units) per lumen     Meds given: 250mL 5% albumin and 10,000 units of epogen given  Complications: BP trended down FCI through treatment, additional albumin infused with 50mL 25% dose and UF  held x 10 minutes until BP recovered, UF goal also gradually titrated down to 3.5Litre from 4.5Litre initial goal as patient was able to tolerate.       Person educated: patient. Knowledge base unable to assess, mute from intubation and somnolent. Barriers to learning: level of consciousness severely reduced. Educated on procedure,  medications and fluid management via verbal mode.      ICEBOAT? Timeout performed pre-treatment  I: Patient was identified using 2 identifiers  C:  Consent Signed Yes  E: Equipment preventative maintenance is current and dialysis delivery system OK to use  B:    Latest Reference Range & Units 10/30/23 12:50   Hep B Surface Agn Nonreactive  Nonreactive   Hepatitis B Surface Antibody Instrument Value <8.00 m[IU]/mL 15.68   Hepatitis B Surface Antibody  Reactive     O: Dialysis orders present and complete prior to treatment  A: Vascular access verified and assessed prior to treatment  T: Treatment was performed at a clinically appropriate time  ?: Patient was allowed to ask questions and address concerns prior to treatment  See Adult Hemodialysis flowsheet in EPIC for further details and post assessment.  Machine water alarm in place and functioning. Transducer pods intact and checked every 15min.   Pt dialyzed in own ICU room.  Chlorine/Chloramine water system checked every 4 hours.  Outpatient Dialysis at D    Patient repositioned every 2 hours during the treatment.  Post treatment report given to CARLEY Fairbanks RN regarding 3.5L of fluid removed, last BP of 151/60, and patient pain rating of 0/10.

## 2023-11-01 NOTE — PLAN OF CARE
ICU End of Shift Progress note: *for vital signs and complete assessments, please see flowsheets.* Went to CT head today. Tube feed restarted at 10ml/hr per Dr. Castro. Patient got up to chair today for one hour. EEG discontinued. Intensivist and NCC updated family and answered questions, supportive.       Neuro: Opens eyes to voice. Intermittently follows commands to wiggle toes, and will squeeze hand on left side, although weak. Moves LUE gross movements spontaneously. Withdraw to pain in all extremities. PERRL.   Cardiac: SR  Resp: coarse LS, frequent suctioning required- thick creamy secretions, no increased O2 needs. On 45% and PEEP 5. SBT 5/5 for 2 hours today tolerated well, with occasional low tidal volume alarms.    GI: small loose/soft BM x2, tube feed restarted per MD at 1800.  : foely removed. Pure wick in place with no output, insignificant amount on bladder scan. HD completed today 3.5L pulled.    Skin: excoriated areas around breast and pannus, nystatin cream applied. scattered bruising, weepy skin around previous IV punctures and arterial line. Blanchable redness on coccyx. Repositioning Q2 hours, mepilex foam on sacrum.   Gtts: n/a     Plan: SBT again tomorrow, assess readiness for extubation. Monitor tube feed tolerance. OT/PT therapy.         Problem: Pain Acute  Goal: Optimal Pain Control and Function  Outcome: Progressing  Intervention: Prevent or Manage Pain  Recent Flowsheet Documentation  Taken 11/1/2023 1200 by Anabel Martin RN  Sensory Stimulation Regulation:   care clustered   lighting decreased   quiet environment promoted  Medication Review/Management:   medications reviewed   high-risk medications identified  Taken 11/1/2023 0800 by Anabel Martin, RN  Medication Review/Management:   medications reviewed   high-risk medications identified     Problem: Mechanical Ventilation Invasive  Goal: Effective Communication  Outcome: Progressing  Intervention: Ensure Effective  Communication  Recent Flowsheet Documentation  Taken 11/1/2023 1200 by Anabel Martin RN  Communication Enhancement Strategies:   communication board used   extra time allowed for response   one-step directions provided  Trust Relationship/Rapport:   care explained   emotional support provided   reassurance provided  Taken 11/1/2023 0800 by Anabel Martin RN  Trust Relationship/Rapport:   care explained   emotional support provided   reassurance provided  Goal: Optimal Device Function  Outcome: Progressing  Intervention: Optimize Device Care and Function  Recent Flowsheet Documentation  Taken 11/1/2023 1400 by Anabel Martin RN  Oral Care:   swabbed with antiseptic solution   teeth brushed   tongue brushed  Taken 11/1/2023 1200 by Anabel Martin RN  Airway Safety Measures:   all equipment/monitors on and audible   suction at bedside  Oral Care:   swabbed with antiseptic solution   teeth brushed   tongue brushed  Taken 11/1/2023 1000 by Anabel Martin RN  Oral Care:   swabbed with antiseptic solution   teeth brushed   tongue brushed  Taken 11/1/2023 0800 by Anabel Martin RN  Airway Safety Measures:   all equipment/monitors on and audible   suction at bedside  Oral Care:   swabbed with antiseptic solution   teeth brushed   tongue brushed  Goal: Mechanical Ventilation Liberation  Outcome: Progressing  Intervention: Promote Extubation and Mechanical Ventilation Liberation  Recent Flowsheet Documentation  Taken 11/1/2023 1200 by Anabel Martin RN  Medication Review/Management:   medications reviewed   high-risk medications identified  Taken 11/1/2023 0800 by Anabel Martin RN  Medication Review/Management:   medications reviewed   high-risk medications identified  Goal: Optimal Nutrition Delivery  Outcome: Progressing  Goal: Absence of Device-Related Skin and Tissue Injury  Outcome: Progressing  Intervention: Maintain Skin and Tissue Health  Recent Flowsheet Documentation  Taken 11/1/2023 1200 by Anabel Martin  RN  Device Skin Pressure Protection:   pressure points protected   tubing/devices free from skin contact  Taken 11/1/2023 0800 by Anabel Martin RN  Device Skin Pressure Protection:   pressure points protected   tubing/devices free from skin contact  Goal: Absence of Ventilator-Induced Lung Injury  Outcome: Progressing  Intervention: Prevent Ventilator-Associated Pneumonia  Recent Flowsheet Documentation  Taken 11/1/2023 1400 by Anabel Martin RN  VAP Prevention Bundle:   HOB elevation maintained   oral care regularly provided   sedation interruption performed   stress ulcer prophylaxis provided   vent circuit breaks minimized   VTE prophylaxis provided  Oral Care:   swabbed with antiseptic solution   teeth brushed   tongue brushed  VAP Prevention Contraindications: per provider order  VAP Prevention Measures: completed  Taken 11/1/2023 1200 by Anabel Martin RN  VAP Prevention Bundle:   HOB elevation maintained   oral care regularly provided   sedation interruption performed   stress ulcer prophylaxis provided   vent circuit breaks minimized   VTE prophylaxis provided  Oral Care:   swabbed with antiseptic solution   teeth brushed   tongue brushed  Head of Bed (HOB) Positioning: HOB at 30-45 degrees  VAP Prevention Contraindications: per provider order  VAP Prevention Measures: completed  Taken 11/1/2023 1000 by Anabel Martin RN  VAP Prevention Bundle: HOB elevation maintained  Oral Care:   swabbed with antiseptic solution   teeth brushed   tongue brushed  VAP Prevention Measures: completed  Taken 11/1/2023 0800 by Anabel Martin RN  VAP Prevention Bundle:   HOB elevation maintained   oral care regularly provided   sedation interruption performed   stress ulcer prophylaxis provided   vent circuit breaks minimized   VTE prophylaxis provided  Oral Care:   swabbed with antiseptic solution   teeth brushed   tongue brushed  Head of Bed (HOB) Positioning: HOB at 30-45 degrees  VAP Prevention Contraindications: per  provider order  VAP Prevention Measures: completed   Goal Outcome Evaluation:

## 2023-11-01 NOTE — PROCEDURES
[facility]    Single Lumen Midline Placement    Date/Time: 10/31/2023 7:57 PM    Performed by: Neena Enriquez RN  Authorized by: Mumtaz Waite MD  Indications: vascular access      UNIVERSAL PROTOCOL   Site Marked: Yes  Prior Images Obtained and Reviewed:  Yes  Required items: Required blood products, implants, devices and special equipment available    Patient identity confirmed:  Verbally with patient, arm band, provided demographic data and hospital-assigned identification number  NA - No sedation, light sedation, or local anesthesia  Confirmation Checklist:  Patient's identity using two indicators, relevant allergies, procedure was appropriate and matched the consent or emergent situation and correct equipment/implants were available  Time out: Immediately prior to the procedure a time out was called    Universal Protocol: the Joint Commission Universal Protocol was followed    Preparation: Patient was prepped and draped in usual sterile fashion       ANESTHESIA    Anesthesia:  Local infiltration  Local Anesthetic:  Lidocaine 1% without epinephrine  Anesthetic Total (mL):  1      SEDATION    Patient Sedated: No    Vital signs: Vital signs monitored during sedation        Preparation: skin prepped with 2% chlorhexidine  Skin prep agent: skin prep agent completely dried prior to procedure  Sterile barriers: maximum sterile barriers were used: cap, mask, sterile gown, sterile gloves, and large sterile sheet  Hand hygiene: hand hygiene performed prior to central venous catheter insertion  Type of line used: Midline  Catheter type: single lumen  Lumen type: non-valved  Catheter size: 4 Fr  Brand: Bard  Lot number: [id number]  Placement method: MST and ultrasound  Number of attempts: 1  Difficulty threading catheter: no  Successful placement: yes  Orientation: left    Location: brachial vein (medial)  Tip Location: distal to axillary vein  Extremity circumference: 32  Visible catheter  length: 0  Internal length: 12 cm  Total catheter length: 12  Dressing and securement: adhesive securement device, alcohol impregnated caps, chlorhexidine disc applied, gloves changed prior to final dressing, securement device, sterile dressing applied and transparent dressing  Post procedure assessment: blood return through all ports and free fluid flow  PROCEDURE   Patient Tolerance:  Patient tolerated the procedure well with no immediate complications

## 2023-11-01 NOTE — PROGRESS NOTES
[facility] ICU RESPIRATORY NOTE        Date of Admission: 10/25/2023    Date of Intubation (most recent): 10/25/2023    Reason for Mechanical Ventilation: Airway protection    Number of Days on Mechanical Ventilation: 8    Met Criteria for Spontaneous Breathing Trial: Yes 5/5, 40% for 2.5hrs this morning    Significant Events Today: None    ABG Results:   Recent Labs   Lab 10/31/23  0044 10/30/23  1339 10/28/23  0425 10/27/23  2042   PH 7.42 7.51* 7.34* 7.28*   PCO2 33* 27* 32* 36   PO2 92 68* 100 110*   HCO3 21 21 17* 17*   O2PER 50 30 40 45         Current Vent Settings: Vent Mode: CMV/AC  (Continuous Mandatory Ventilation/ Assist Control)  FiO2 (%): 40 %  Resp Rate (Set): 25 breaths/min  Tidal Volume (Set, mL): 400 mL  PEEP (cm H2O): 5 cmH2O  Pressure Support (cm H2O): 5 cmH2O  Resp: 25      Doni Elliott, RT on 11/1/2023 at 5:18 PM

## 2023-11-01 NOTE — PHARMACY-VANCOMYCIN DOSING SERVICE
"Pharmacy Vancomycin Note  Date of Service 2023  Patient's  1940   83 year old, female    Indication: Sepsis  Day of Therapy: 2  Current vancomycin regimen:  1750 mg IV x1  Current vancomycin monitoring method: Renal Replacement Therapy  Current vancomycin therapeutic monitoring goal: 15-20 mg/L      Current estimated CrCl = Estimated Creatinine Clearance: 7.9 mL/min (A) (based on SCr of 6.72 mg/dL (H)).    Creatinine for last 3 days  10/30/2023:  4:34 AM Creatinine 8.30 mg/dL  10/31/2023:  4:09 AM Creatinine 5.75 mg/dL  2023:  4:19 AM Creatinine 6.72 mg/dL    Recent Vancomycin Levels (past 3 days)  2023:  4:19 AM Vancomycin 19.2 ug/mL    Vancomycin IV Administrations (past 72 hours)                     vancomycin (VANCOCIN) 1,750 mg in 0.9% NaCl 500 mL intermittent infusion (mg) 1,750 mg Given 10/31/23 1814                    Nephrotoxins and other renal medications (From now, onward)      Start     Dose/Rate Route Frequency Ordered Stop    10/31/23 1000  vancomycin place kumar - receiving intermittent dosing         1 each Intravenous SEE ADMIN INSTRUCTIONS 10/31/23 1000      10/31/23 0930  piperacillin-tazobactam (ZOSYN) 2.25 g vial to attach to  ml bag        Note to Pharmacy: For [facility], [facility] and [facility]: For Zosyn-naive patients, use the \"Zosyn initial dose + extended infusion\" order panel.    2.25 g  over 30 Minutes Intravenous EVERY 6 HOURS 10/31/23 0929      10/27/23 1130  norepinephrine (LEVOPHED) 16 mg in  mL infusion MAX CONC CENTRAL LINE         0.01-0.6 mcg/kg/min × 94.8 kg  0.9-53.3 mL/hr  Intravenous CONTINUOUS 10/27/23 1113                 Contrast Orders - past 72 hours (72h ago, onward)      Start     Dose/Rate Route Frequency Stop    10/31/23 223  gadobutrol (GADAVIST) injection 10 mL         10 mL Intravenous ONCE 10/31/23 2248    10/31/23 1330  iopamidol (ISOVUE-370) solution 115 mL         115 mL Intravenous ONCE 10/31/23 1416            Interpretation of " levels and current regimen:  Vancomycin level is reflective of therapeutic level    Has serum creatinine changed greater than 50% in last 72 hours: No    Urine output:  diminished urine output    Renal Function: ARF on Dialysis    Plan:  Give 10 mg/kg, 1000 mg today after dialysis. Expect post dialysis level to be between 10.5-13.9  Vancomycin monitoring method: Renal Replacement Therapy  Vancomycin therapeutic monitoring goal: 15-20 mg/L  Pharmacy will check vancomycin levels as appropriate in 1-3 Days.  Serum creatinine levels will be ordered daily for the first week of therapy and at least twice weekly for subsequent weeks.    Lauren May, PharmD Student

## 2023-11-01 NOTE — PROGRESS NOTES
ICU End of Shift Progress note: *for vital signs and complete assessments, please see flowsheets.* Went to CT head/abd today. Midline IV placed today. 1 unit PRBC given today.      Neuro: intermittently opens eyes to voice. Does not follow commands, did blink when asked.  Moves LUE gross movements spontaneously. BLE withdraw to pain, no response in RUE. PERRL.   Cardiac: SR  Resp: coarse LS, frequent suctioning required- thick creamy secretions, appearing like tube feed, no increased O2 needs. On 50% and PEEP 5. Chest xray done.   GI: small loose/soft BM, tube feed held per MD at 1130, ok to give meds.  : foely in place with minimal output, lasix stopped, HD completed yesterday   Skin: excoriated areas around breast and pannus, scattered bruising, weepy skin around previous IV punctures and arterial line.   Gtts: n/a     Plan: Clarify MRI compatibility with neurosurgery team. Nutrition to address tube feed tomorrow. Recheck Hb tonight at 2030.

## 2023-11-01 NOTE — PROGRESS NOTES
"[facility]  Neurosurgery Daily Progress Note    Assessment & Plan   Procedure(s):  Thoracic 11 to pelvis robotic decompression and fusion   -7 Days Post-Op  Patient was seen in ICU, intubated, neuro exam stable. Shakes head \"no\" when asked if she has any pain. Right drain with 20ml output and left drain with 40ml output overnight. Receiving dialysis this morning.     Plan:  - Remove right drain today, continue to monitor left drain output   - Medical cares per Intensivist team, appreciate assistance  - Pain control measures as needed  - Continue abx   - Routine wound care   - PT/OT  - Once medically stable, will need upright xray while wearing TLSO brace   - Appreciate assistance from specialties        Discussed with Dr. Mariann Mccullough, [facility] Neurosurgery  [address] [place] [id number]  Tel [phone]  Pager [phone]    Interval History   Stable     Physical Exam   Temp: 98.4  F (36.9  C) Temp src: Oral BP: (!) 140/62 Pulse: 77   Resp: 24 SpO2: 99 % O2 Device: Mechanical Ventilator    Vitals:    10/30/23 0600 10/31/23 0400 11/01/23 0400   Weight: 107.8 kg (237 lb 9.6 oz) 104.1 kg (229 lb 6.4 oz) 105.9 kg (233 lb 7.5 oz)       Intubated   Opens eyes to voice   PERRL  Withdraws to pain in all 4 extremities   Squeeze hands and wiggles toes to command   Incision: CDI with dressing  Drains: Intact    Medications    dextrose      dextrose      HYDROmorphone Stopped (10/27/23 1048)    norepinephrine Stopped (10/27/23 1937)    sodium chloride Stopped (11/01/23 1100)       - MEDICATION INSTRUCTIONS for Dialysis Patients -   Does not apply See Admin Instructions    B and C vitamin Complex with folic acid  5 mL Per Feeding Tube Daily    chlorhexidine  15 mL Mouth/Throat Q12H    diltiazem  60 mg Oral or Feeding Tube Q6H NATACHA    hydrALAZINE  10 mg Oral or Feeding Tube Q8H    insulin aspart  1-12 Units Subcutaneous Q4H    " levothyroxine  100 mcg Oral or NG Tube QAM AC    meropenem  500 mg Intravenous Q24H    pantoprazole  40 mg Per Feeding Tube QAM AC    Or    pantoprazole  40 mg Intravenous QAM AC    polyethylene glycol  17 g Oral or NG Tube Daily    protein modular  1 packet Per Feeding Tube Daily    senna-docusate  1 tablet Oral or NG Tube BID    sodium chloride (PF)  10 mL Intracatheter Q8H    sodium chloride (PF)  3 mL Intracatheter Q8H    vancomycin  1,000 mg Intravenous Once    vancomycin place kumar - receiving intermittent dosing  1 each Intravenous See Admin Instructions

## 2023-11-01 NOTE — PROGRESS NOTES
Critical Care  Note      11/01/2023    Name: Mya Hui MRN#: [id number]   Age: 83 year old YOB: 1940     Hsptl Day# 7  ICU DAY #    MV DAY #             Problem List:   Principal Problem:    S/P lumbar spinal fusion  Active Problems:    Acute kidney failure with tubular necrosis (H)           Summary/Hospital Course:       Interval/overnight events:  - CT chest abdomen pelvis done yesterday showed consolidation in the right upper lobe with small effusion.  - MRI brain done, showed 4 mm subtle focus in the left basal ganglia with question of for prominent vascular structure versus a focus of ischemia with minimal hemorrhage.  Repeat CT in 6 hours recommended.  - Patient's mental status better in the morning today, open eyes on command and moves all extremities.  - Remains off of sedating medications.  - Scheduled to undergo HD today.  - Continues on minimal vent settings with 40% FiO2 and 5 of PEEP.  - Continues to have thick, grey secretions. Sputum culture growing Enterobacter cloacae and E. coli.      Assessment and plan :     Mya Hui IS a 83 year old female admitted on 10/25/2023 for hemorrhagic shock post lumbar spinal fusion surgery along with LISA and encephalopathy.      I have personally reviewed the daily labs, imaging studies, cultures and discussed the case with referring physician and consulting physicians.      My assessment and plan by system for this patient is as follows:     #S/p lumbar spinal fusion, with prior radicular symptoms and scoliosis  For lumbar scoliosis and radiculopathy  #Hemorrhagic shock  Postprocedure.  Received multiple blood products, remains off vasopressors.  #Possible MI  ST changes noted intraoperatively. Minimal troponin elevation.  Echocardiogram did not show any significant abnormality.  #Acute hypoxic respiratory failure  Remains intubated postoperatively.  Current obstacle to extubation is her mental status as well as increased secretions.   On minimal vent settings with 30% FiO2, 5 of PEEP and respiratory rate of 25.  #LISA  Nephrology following.  Likely ATN from previous hemorrhagic shock.  Urine output improving.  On HD.  #Encephalopathy  CT head negative.  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  #Ventilator associated pneumonia  Sputum culture positive for E. coli and Enterobacter cloacae.  Final sensitivities pending.  #HTN  #DM  #HL  #Obesity  #History of hypothyroid and normal TSH  #History of CKD  #History of lower extremity edema  #GERD     Plan:  - Continue to monitor CBC daily.  Has been off vasopressors.  -Transfuse to keep hemoglobin above 8 with concerns of cardiac ischemia intraoperati  - Appreciate neurology recommendations.  CT head and MRI reviewed.  Will order CT head to be done at 2 PM today to follow-up on the concerned focus on the MRI in the left basal ganglia.  - Continue to perform daily SBT/SAT.  Her main concern right now with extubation is her significant secretions and her inability to clear those.  We will continue to monitor her mental status.  - We will change antibiotics to meropenem, pending final sensitivities considering Enterobacter cloacae and E. coli cultures.  -Continue hemodialysis.  Appreciate nephrology recommendations.  BUN/creatinine improving.  - Minimize further sedative/psychotropic meds.  - Patient has a significant hiatal hernia.  The OG tube seems to be in the stomach.  Due to concerns for aspiration and vomiting in the past, would initiate tube feeds at a persistent low rate currently.      IV/Access:   1. Venous access - midline, right IJ HD catheter  2. Arterial access -radial A-line    Plan  - central access required and necessary.     ICU Prophylaxis:   1. DVT: mechanical, due to concerns for hemorrhagic shock  2. VAP: HOB 30 degrees, chlorhexidine rinse  3. Stress Ulcer: PPI  4. Restraints: Nonviolent soft two point restraints required and necessary for patient safety and  "continued cares and good effect as patient continues to pull at necessary lines, tubes despite education and distraction. Will readdress daily.   5. Wound care  -routine wound care post surgery  6. Feeding -enteral nutrition can be restarted today.  7. Family Update: Daughter April supposed to come in the evening.  We will update her at the bedside.  8. Disposition -was seen in the ICU today for need for mechanical ventilation and HD       Clinically Significant Risk Factors             # Anion Gap Metabolic Acidosis: Highest Anion Gap = 21 mmol/L in last 2 days, will monitor and treat as appropriate  # Hypoalbuminemia: Lowest albumin = 2.5 g/dL at 11/1/2023  4:19 AM, will monitor as appropriate   # Thrombocytopenia: Lowest platelets = 96 in last 2 days, will monitor for bleeding   # Hypertension: Noted on problem list        # Obesity: Estimated body mass index is 38.85 kg/m  as calculated from the following:    Height as of this encounter: 1.651 m (5' 5\").    Weight as of this encounter: 105.9 kg (233 lb 7.5 oz).   # Moderate Malnutrition: based on nutrition assessment    # Financial/Environmental Concerns: none                   Key Medications:      - MEDICATION INSTRUCTIONS for Dialysis Patients -   Does not apply See Admin Instructions    B and C vitamin Complex with folic acid  5 mL Per Feeding Tube Daily    chlorhexidine  15 mL Mouth/Throat Q12H    diltiazem  60 mg Oral or Feeding Tube Q6H NATACHA    hydrALAZINE  10 mg Oral or Feeding Tube Q8H    insulin aspart  1-12 Units Subcutaneous Q4H    levothyroxine  100 mcg Oral or NG Tube QAM AC    meropenem  500 mg Intravenous Q24H    nystatin   Topical BID    pantoprazole  40 mg Per Feeding Tube QAM AC    Or    pantoprazole  40 mg Intravenous QAM AC    polyethylene glycol  17 g Oral or NG Tube Daily    protein modular  1 packet Per Feeding Tube Daily    senna-docusate  1 tablet Oral or NG Tube BID    sodium chloride (PF)  10 mL Intracatheter Q8H    sodium chloride " (PF)  3 mL Intracatheter Q8H    vancomycin  1,000 mg Intravenous Once    vancomycin place kumar - receiving intermittent dosing  1 each Intravenous See Admin Instructions      dextrose      dextrose      HYDROmorphone Stopped (10/27/23 1048)    norepinephrine Stopped (10/27/23 1937)    sodium chloride Stopped (11/01/23 1100)              Physical Examination:   Temp:  [97.7  F (36.5  C)-99.3  F (37.4  C)] 98.3  F (36.8  C)  Pulse:  [72-88] 80  Resp:  [13-31] 25  BP: (135-181)/(53-80) 154/60  MAP:  [64 mmHg-89 mmHg] 79 mmHg  Arterial Line BP: ()/(40-76) 136/51  FiO2 (%):  [5 %-50 %] 50 %  SpO2:  [90 %-100 %] 99 %    Intake/Output Summary (Last 24 hours) at 11/1/2023 1325  Last data filed at 11/1/2023 1145  Gross per 24 hour   Intake 1580 ml   Output 4045 ml   Net -2465 ml     Wt Readings from Last 4 Encounters:   11/01/23 105.9 kg (233 lb 7.5 oz)   10/19/23 95 kg (209 lb 8 oz)   09/28/23 97.1 kg (214 lb)   09/14/23 97.1 kg (214 lb)     Arterial Line BP: ()/(40-76) 136/51  MAP:  [64 mmHg-89 mmHg] 79 mmHg  BP - Mean:  [] 101  Vent Mode: CMV/AC  (Continuous Mandatory Ventilation/ Assist Control)  FiO2 (%): 50 %  Resp Rate (Set): 25 breaths/min  Tidal Volume (Set, mL): 400 mL  PEEP (cm H2O): 5 cmH2O  Pressure Support (cm H2O): 5 cmH2O  Resp: 25    Recent Labs   Lab 10/31/23  0044 10/30/23  1339 10/28/23  0425 10/27/23  2042   PH 7.42 7.51* 7.34* 7.28*   PCO2 33* 27* 32* 36   PO2 92 68* 100 110*   HCO3 21 21 17* 17*   O2PER 50 30 40 45       GEN: no acute distress   HEENT: head ncat, sclera anicteric, OP patent, trachea midline   PULM: unlabored synchronous with vent, clear anteriorly    CV/COR: RRR S1S2 no gallop,  No rub, no murmur  ABD: soft nontender, hypoactive bowel sounds, no mass  EXT:  warm and well perfused x4  NEURO: PERRL, no obvious deficits  SKIN: no obvious rash  LINES: clean, dry intact         Data:   All data and imaging reviewed     ROUTINE ICU LABS (Last four results)  CMP  Recent  Labs   Lab 11/01/23  1227 11/01/23  0754 11/01/23 0419 11/01/23  0415 10/31/23  0412 10/31/23  0409 10/30/23  0810 10/30/23  0434 10/29/23  0612 10/29/23  0414   NA  --   --  139  --   --  140  --  144  --  146*   POTASSIUM  --   --  4.8  --   --  4.4  --  5.1  --  4.8   CHLORIDE  --   --  100  --   --  102  --  108*  --  112*   CO2  --   --  18*  --   --  20*  --  15*  --  15*   ANIONGAP  --   --  21*  --   --  18*  --  21*  --  19*   * 114* 116* 107*   < > 177*   < > 141*   < > 122*   BUN  --   --  106.7*  --   --  82.6*  --  105.4*  --  78.7*   CR  --   --  6.72*  --   --  5.75*  --  8.30*  --  7.34*   GFRESTIMATED  --   --  6*  --   --  7*  --  4*  --  5*   TERESA  --   --  7.4*  --   --  7.2*  --  7.4*  --  7.3*   MAG  --   --  2.3  --   --  2.1  --  2.8*  --  2.7*   PHOS  --   --  7.0*  --   --  5.8*  --  6.6*  --  5.9*   PROTTOTAL  --   --  4.5*  --   --  4.2*  --  4.5*  --  4.4*   ALBUMIN  --   --  2.5*  --   --  2.6*  --  2.5*  --  2.6*   BILITOTAL  --   --  0.4  --   --  0.3  --  0.3  --  0.3   ALKPHOS  --   --  83  --   --  89  --  82  --  66   AST  --   --  44  --   --  73*  --  207*  --  741*   ALT  --   --  36  --   --  47  --  73*  --  113*    < > = values in this interval not displayed.     CBC  Recent Labs   Lab 11/01/23  0419 10/31/23  2100 10/31/23  0409 10/30/23  0434 10/29/23  0414   WBC 10.9  --  5.1 8.6 7.4   RBC 2.77*  --  2.45* 2.73* 2.89*   HGB 8.1* 8.0* 7.1* 8.0* 8.2*   HCT 24.4*  --  21.6* 24.4* 25.4*   MCV 88  --  88 89 88   MCH 29.2  --  29.0 29.3 28.4   MCHC 33.2  --  32.9 32.8 32.3   RDW 17.2*  --  17.5* 17.5* 17.4*   *  --  96* 86* 58*     INR  Recent Labs   Lab 10/27/23  1022 10/26/23  0414 10/25/23  2114 10/25/23  1629   INR 1.57* 1.58* 1.73* 2.11*     Arterial Blood Gas  Recent Labs   Lab 10/31/23  0044 10/30/23  1339 10/28/23  0425 10/27/23  2042   PH 7.42 7.51* 7.34* 7.28*   PCO2 33* 27* 32* 36   PO2 92 68* 100 110*   HCO3 21 21 17* 17*   O2PER 50 30 40 45       All  "cultures:  No results for input(s): \"CULT\" in the last 168 hours.  Recent Results (from the past 24 hour(s))   CT Head w/o Contrast    Narrative    CT SCAN OF THE HEAD WITHOUT CONTRAST October 31, 2023 2:42 PM     HISTORY: Altered mental status.    TECHNIQUE: Axial images of the head and coronal reformations without  IV contrast material. Radiation dose for this scan was reduced using  automated exposure control, adjustment of the mA and/or kV according  to patient size, or iterative reconstruction technique.    COMPARISON: CT head 10/27/2023.    FINDINGS: There is no evidence of intracranial hemorrhage, mass, acute  infarct or anomaly. The ventricles are normal in size, shape and  configuration. Mild diffuse parenchymal volume loss. Mild to moderate  extent of scattered patchy cerebral white matter hypodensities which  are nonspecific, but likely related to chronic microvascular ischemic  disease. Atherosclerotic calcifications involving the carotid siphons.    Bilateral lens implants. Mild mucosal thickening in the bilateral  ethmoid sinuses. Small amount of fluid/membrane thickening in the  right mastoid tip. The bony calvarium and bones of the skull base  appear intact.       Impression    IMPRESSION:   1. No CT findings of acute intracranial process.  2. Mild generalized brain parenchymal volume loss.  3. Mild to moderate patchy nonspecific white matter disease, which may  be due to chronic small vessel ischemic changes.    JORGE PATEL MD         SYSTEM ID:  [id number]   CT Chest/Abdomen/Pelvis w Contrast    Narrative    CT CHEST/ABDOMEN/PELVIS W CONTRAST 10/31/2023 2:46 PM    CLINICAL HISTORY: Hemorrhagic shock; suspected sepsis; ? source of  infection/bleeding    TECHNIQUE: CT scan of the chest, abdomen, and pelvis was performed  following injection of IV contrast. Multiplanar reformats were  obtained. Dose reduction techniques were used.   CONTRAST: 115 mL Isovue-[id number]    COMPARISON: None.    FINDINGS: "   LUNGS AND PLEURA: Small right effusion and trace left effusion with  bibasilar atelectasis. Small patchy airspace opacity is noted  bilaterally with more focal consolidation within the right upper lobe.    MEDIASTINUM/AXILLAE: Endotracheal tube noted in appropriate position.  Heart normal in size. No adenopathy. Enteric tube coiled within the  stomach. Moderate hiatal hernia. No filling defect to suggest a  pulmonary embolism.    HEPATOBILIARY: Liver within normal limits. Cholecystectomy.    PANCREAS: Normal.    SPLEEN: Normal.    ADRENAL GLANDS: Normal.    KIDNEYS/BLADDER: Tiny cyst within the interpolar region of the left  kidney which is benign and needs no further follow-up.    BOWEL: Diverticulosis in the colon. No acute inflammatory change. No  obstruction.     PELVIC ORGANS: Bladder is collapsed. Fibroids are noted within the  uterus. Endometrium appears thickened measuring 1.5 cm.    ADDITIONAL FINDINGS: None.    MUSCULOSKELETAL: Status post surgical fixation of lower lumbar spine.  Degenerative changes of the spine.      Impression    IMPRESSION:  1.  Consolidation noted in the right upper lobe with bilateral patchy  airspace opacities concerning for pneumonia.  2.  Small right effusion and trace left effusion.  3.  No acute findings in the abdomen or pelvis. No abscess.  4.  Endometrium is thickened in a postmenopausal female. Recommend  nonemergent pelvic ultrasound.    DWAINE HEBERT MD         SYSTEM ID:  [id number]   MR Brain w/o & w Contrast    Narrative    EXAM: MR BRAIN W/O and W CONTRAST  LOCATION: [facility]  DATE: 10/31/2023    INDICATION: encephalopathy, right sided weakness, dysconjugate gaze  COMPARISON: CT head 10/31/2023  CONTRAST: 10 mL Gadavist  TECHNIQUE: Routine multiplanar multisequence head MRI without and with intravenous contrast.    FINDINGS:  INTRACRANIAL CONTENTS: 4 mm subtle focus of restricted diffusion in the left basal ganglia (series 603 image 20).  Tiny associated foci of low gradient signal. Somewhat branching pattern suggests this could be due to a prominent vascular structure; less   likely due to tiny focus of acute/subacute ischemia with minimal associated hemorrhage. A branching vascular structures also suggested on prior CT. As a conservative measure, 6 hour CT follow-up recommended to ensure stability. Patchy and confluent   nonspecific T2/FLAIR hyperintensities within the cerebral white matter most consistent with moderate chronic microvascular ischemic change. Moderate generalized cerebral atrophy. No hydrocephalus. Normal position of the cerebellar tonsils. No pathologic   contrast enhancement.    SELLA: No abnormality accounting for technique.    OSSEOUS STRUCTURES/SOFT TISSUES: Normal marrow signal. The major intracranial vascular flow voids are maintained.     ORBITS: No abnormality accounting for technique.     SINUSES/MASTOIDS: No paranasal sinus mucosal disease. Opacification mastoid air cells bilaterally.       Impression    IMPRESSION:  1.  4 mm subtle focus of restricted diffusion in the left basal ganglia. Tiny associated foci of low gradient signal. Somewhat branching pattern suggests this could be due to a prominent vascular structure; less likely due to tiny focus of acute/subacute   ischemia with minimal associated hemorrhage. A branching vascular structures also suggested on prior CT. As a conservative measure, 6 hour CT follow-up recommended to ensure stability.  2.  Moderate chronic small vessel ischemia.  3.  Moderate atrophy.           Billing: This patient is critically ill: Yes. Total critical care time today 60 min, excluding procedures.                         LITO Werner   of Medicine  [facility]  Pulmonary, Allergy, Critical Care and Sleep Medicine  Pager - [phone]

## 2023-11-01 NOTE — PROGRESS NOTES
Renal Medicine Progress Note            Assessment/Plan:     Assessment: Mya Hui is a 83-year-old woman with PMH DM2, hypothyroidism, HLD, HTN admitted 10/25/2023 due to hemorrhagic shock after lumbar spinal fusion.  Course complicated by LISA and encephalopathy.    Nonoliguric LISA, worsening  CKD IIIA  Anasarca  Hypoalbuminemia  Baseline creatinine 1.1-1.4, EGFR 45-50.  No history of appreciable proteinuria.  Worsening creatinine in setting of hemorrhagic shock, most currently has ATN as a result.  Has developed fluid overload, Lasix drip started with good output, however renal function continues to have anephric rising creatinine daily despite this.  She has a rising BUN and worsening acidosis, reasonable to start dialysis to improve mentation and improve fluid status.  -Tunneled dialysis catheter placed by IR on 10/31st   - HD session 11/1  -Daily BMP  -Renally dose meds    Anion gap metabolic acidosis, improved  Largely due to renal failure.     Hemorrhagic shock, resolved  S/p lumbar fusion  Shock liver, improved  Underwent lumbar fusion on 10/25, had 2 L blood loss during surgery.  Developed hemorrhagic shock thereafter.  Multiple transfusions and pressors, currently not requiring pressors.  ELY drains still in place.    Enterobacter PNA   Sputum cx with enterobacter. Zosyn switched to meropenem 11/1.     Plan/Recs:  1) HD today    Reviewed notes from NSG, intensivist, RNs.    Chance Chacon MD   [facility]  Office: [phone]          Interval History:     Thick creamy secretions, sent for cx 10/30 and growing Enterobacter. Currently on zosyn which won't cover it, primary team to change abx to meropenem  Blood cx negative thus far   CT CAP with RUL consolidation  MR brain with left basal ganglia foci, unclear if vascular or an acute/subacute ischemic area (planning for repeat CT)   430 ml UOP yesterday, 215 ml so far today. Remains volume up     Seen during dialysis, toleratingwell  "  Was able to follow commands today          Medications and Allergies:      - MEDICATION INSTRUCTIONS for Dialysis Patients -   Does not apply See Admin Instructions    albumin human  250 mL Intravenous Once in dialysis/CRRT    B and C vitamin Complex with folic acid  5 mL Per Feeding Tube Daily    chlorhexidine  15 mL Mouth/Throat Q12H    diltiazem  60 mg Oral or Feeding Tube Q6H NATACHA    epoetin stephie-epbx  10,000 Units Intravenous Once in dialysis/CRRT    sodium chloride (PF) 0.9%  10 mL Intracatheter Once in dialysis/CRRT    Followed by    heparin  1.3-2.6 mL Intracatheter Once in dialysis/CRRT    sodium chloride (PF) 0.9%  10 mL Intracatheter Once in dialysis/CRRT    Followed by    heparin  1.3-2.6 mL Intracatheter Once in dialysis/CRRT    hydrALAZINE  10 mg Oral or Feeding Tube Q8H    insulin aspart  1-12 Units Subcutaneous Q4H    levothyroxine  100 mcg Oral or NG Tube QAM AC    - MEDICATION INSTRUCTIONS -   Does not apply Once    pantoprazole  40 mg Per Feeding Tube QAM AC    Or    pantoprazole  40 mg Intravenous QAM AC    piperacillin-tazobactam  2.25 g Intravenous Q6H    polyethylene glycol  17 g Oral or NG Tube Daily    protein modular  1 packet Per Feeding Tube Daily    senna-docusate  1 tablet Oral or NG Tube BID    sodium chloride (PF)  10 mL Intracatheter Q8H    sodium chloride (PF)  3 mL Intracatheter Q8H    sodium chloride 0.9%  300 mL Hemodialysis Machine Once    vancomycin place kumar - receiving intermittent dosing  1 each Intravenous See Admin Instructions        Allergies   Allergen Reactions    Fentanyl Nausea and Vomiting     VERY sensitive to most narcotics if not all.    Morphine And Related Nausea            Physical Exam:   Vitals were reviewed  BP (!) 140/62   Pulse 79   Temp 98.4  F (36.9  C) (Oral)   Resp 22   Ht 1.651 m (5' 5\")   Wt 105.9 kg (233 lb 7.5 oz)   LMP  (LMP Unknown)   SpO2 96%   BMI 38.85 kg/m      Wt Readings from Last 3 Encounters:   11/01/23 105.9 kg (233 lb 7.5 " oz)   10/19/23 95 kg (209 lb 8 oz)   09/28/23 97.1 kg (214 lb)       Intake/Output Summary (Last 24 hours) at 10/30/2023 0922  Last data filed at 10/30/2023 0800  Gross per 24 hour   Intake 1717.43 ml   Output 1370 ml   Net 347.43 ml       GENERAL APPEARANCE: intubated, NAD   HEENT: ETT in place   RESP: coarse bilaterally R>>L  CV: RRR, no murmur  ABDOMEN: soft, nontender  EXTREMITIES/SKIN: Anasarca, 2-3+ pitting edema in lower extremities.  Weeping of dependent areas  NEURO:  sedated   LINES:  + sykes with concentrated yellow urine             Data:     BMP  Recent Labs   Lab 11/01/23  0754 11/01/23 0419 11/01/23 0415 11/01/23  0010 10/31/23  0412 10/31/23  0409 10/30/23  0810 10/30/23  0434 10/29/23  0612 10/29/23  0414   NA  --  139  --   --   --  140  --  144  --  146*   POTASSIUM  --  4.8  --   --   --  4.4  --  5.1  --  4.8   CHLORIDE  --  100  --   --   --  102  --  108*  --  112*   TERESA  --  7.4*  --   --   --  7.2*  --  7.4*  --  7.3*   CO2  --  18*  --   --   --  20*  --  15*  --  15*   BUN  --  106.7*  --   --   --  82.6*  --  105.4*  --  78.7*   CR  --  6.72*  --   --   --  5.75*  --  8.30*  --  7.34*   * 116* 107* 109*   < > 177*   < > 141*   < > 122*    < > = values in this interval not displayed.     CBC  Recent Labs   Lab 11/01/23  0419 10/31/23  2100 10/31/23  0409 10/30/23  0434 10/29/23  0414   WBC 10.9  --  5.1 8.6 7.4   HGB 8.1* 8.0* 7.1* 8.0* 8.2*   HCT 24.4*  --  21.6* 24.4* 25.4*   MCV 88  --  88 89 88   *  --  96* 86* 58*     Lab Results   Component Value Date    AST 44 11/01/2023    ALT 36 11/01/2023    ALKPHOS 83 11/01/2023    BILITOTAL 0.4 11/01/2023    CHARLETTE 42 10/31/2023     Lab Results   Component Value Date    INR 1.57 (H) 10/27/2023     Color Urine (no units)   Date Value   10/26/2023 Yellow   03/08/2019 Yellow     Appearance Urine (no units)   Date Value   10/26/2023 Slightly Cloudy (A)   03/08/2019 Cloudy     Glucose Urine (mg/dL)   Date Value   10/26/2023 500 (A)    03/08/2019 Negative     Bilirubin Urine (no units)   Date Value   10/26/2023 Negative   03/08/2019 Negative     Ketones Urine (mg/dL)   Date Value   10/26/2023 Trace (A)   03/08/2019 Negative     Specific Gravity Urine (no units)   Date Value   10/26/2023 1.024   03/08/2019 1.020     pH Urine   Date Value   10/26/2023 5.5   03/08/2019 5.0 pH     Protein Albumin Urine (mg/dL)   Date Value   10/26/2023 300 (A)   03/08/2019 100 (A)     Urobilinogen Urine   Date Value   01/17/2022 0.2 E.U./dL   08/10/2017 0.2 EU/dL     Nitrite Urine (no units)   Date Value   10/26/2023 Negative   03/08/2019 Negative     Leukocyte Esterase Urine (no units)   Date Value   10/26/2023 Negative   03/08/2019 Moderate (A)         Attestation:  I have reviewed today's vital signs, notes, medications, labs and imaging.    Chance Chacon MD  [facility] - Nephrology  Office: [phone]

## 2023-11-01 NOTE — PROGRESS NOTES
[facility]    Vascular Neurology Progress Note    Interval Events  - neurological exam improved today   - hgb stable, normal WBC, AST normalized     HPI Summary  Mya Hui is a 83 year old female with PMHx carotid artery stenosis, HLD, hard of hearing, HTN, hypothyroid, DM2, cataract, OA, chronic right shoulder pain/rotator cuff arthropathy, CKD, migraine who was admitted 10/24 post T11-pelvis robotic decompression and fusion for lumbar scoliosis and radiculopathy. Per chart review, she had severe right leg pain pre op. Neurosurgery exam in September does not note any extremity weakness but she was using a walker because her right leg sometimes gave out on her. EMG was consistent with L5 and S1 radiculopathy. Her intraoperative course was complicated by 3.5L EBL and hypotension. She received 6 units PRBCs and 2 units FFP and was started on levophed. In the ICU, post-op, she received 2 additional units of FFP, 10 units cryo, and 1 unit of platelets. She also developed LISA for which she is undergoing dialysis, had ST changes intra-op, and shock liver. She remains intubated. Neurology was consulted due to ongoing encephalopathy.   Daughter was updated via telephone.     Evaluation Summarized     MRI/Head CT CT 11/1: no hemorrhage; unchanged compared to previous imaging  MRI: focus of restricted diffusion in the left basal ganglia (vascular structure vs acute/subacute infarct w/ minimal associated hemorrhage)   CT 10/31: no acute findings  CT 10/27: no acute findings, moderate CSVID, mild global volume loss    Intracranial Vasculature N/A   Cervical Vasculature N/A       Echocardiogram EF 65-70%, no WMA, normal atrial sizes, no atrial shunt    EKG/Telemetry ST    Other Testing EEG (final read pending): no seizures  Ammonia 42  TSH 0.17, free T4 0.64  Procalcitonin 3.01  BC NGTD   Resp culture: enterobacter cloacae, e coli   CT C/A/P: right upper lobe consolidation, small right  "effusion, trace left effusion, thickened endometrium       LDL  No lab value available in past 30 days   A1C  10/19/2023: 6.2 %  10/25/2023: 6.3 %   Troponin No lab value available in past 48 hrs        Impression   #Encephalopathy: MRI finding (left BG small focus of restricted diffusion) does not explain her encephalopathy; likely multifactorial   #lumbar scoliosis and L5-S1 radiculopathy s/p T11-pelvis robotic decompression and fusion; intra-op course complicated by hemorrhagic shock  #post-operative course complicated by LISA (now receiving dialysis) and shock liver    Plan  - disconnect from EEG, no seizures   - avoid sedating medications if able   - normotension   - neuro checks q2h    Patient Follow-up    - repeat MRI as an outpatient in 2-3 months to re-assess left basal ganglia restricted diffusion (ordered)    Vascular neurology service will sign off.     Fernanda Serrano NP  Vascular Neurology    To page me or covering stroke neurology team member, click here: AMCOM  Choose \"On Call\" tab at top, then select \"NEUROLOGY/ALL SITES\" from middle drop-down box, press Enter, then look for \"stroke\" or \"telestroke\" for your site.  ______________________________________________________    Clinically Significant Risk Factors             # Anion Gap Metabolic Acidosis: Highest Anion Gap = 21 mmol/L in last 2 days, will monitor and treat as appropriate  # Hypoalbuminemia: Lowest albumin = 2.5 g/dL at 11/1/2023  4:19 AM, will monitor as appropriate   # Thrombocytopenia: Lowest platelets = 96 in last 2 days, will monitor for bleeding   # Hypertension: Noted on problem list        # Obesity: Estimated body mass index is 38.85 kg/m  as calculated from the following:    Height as of this encounter: 1.651 m (5' 5\").    Weight as of this encounter: 105.9 kg (233 lb 7.5 oz).   # Moderate Malnutrition: based on nutrition assessment    # Financial/Environmental Concerns: none           Medications   Scheduled Meds   - MEDICATION " INSTRUCTIONS for Dialysis Patients -   Does not apply See Admin Instructions    B and C vitamin Complex with folic acid  5 mL Per Feeding Tube Daily    chlorhexidine  15 mL Mouth/Throat Q12H    diltiazem  60 mg Oral or Feeding Tube Q6H NATACHA    hydrALAZINE  10 mg Oral or Feeding Tube Q8H    insulin aspart  1-12 Units Subcutaneous Q4H    levothyroxine  100 mcg Oral or NG Tube QAM AC    meropenem  500 mg Intravenous Q24H    nystatin   Topical BID    pantoprazole  40 mg Per Feeding Tube QAM AC    Or    pantoprazole  40 mg Intravenous QAM AC    polyethylene glycol  17 g Oral or NG Tube Daily    protein modular  1 packet Per Feeding Tube Daily    senna-docusate  1 tablet Oral or NG Tube BID    sodium chloride (PF)  10 mL Intracatheter Q8H    sodium chloride (PF)  3 mL Intracatheter Q8H    vancomycin  1,000 mg Intravenous Once    vancomycin place kumar - receiving intermittent dosing  1 each Intravenous See Admin Instructions       Infusion Meds   dextrose      dextrose      HYDROmorphone Stopped (10/27/23 1048)    norepinephrine Stopped (10/27/23 1937)    sodium chloride Stopped (11/01/23 1100)       PRN Meds  acetaminophen, albumin human, bisacodyl, calcium carbonate, dextrose, dextrose, glucose **OR** dextrose **OR** glucagon, fentaNYL, fentaNYL, hydrOXYzine, labetalol, lidocaine 4%, lidocaine 4%, lidocaine (buffered or not buffered), lidocaine (buffered or not buffered), magnesium hydroxide, methocarbamol, miconazole, naloxone **OR** naloxone **OR** naloxone **OR** naloxone, ondansetron **OR** ondansetron, oxyCODONE IR **OR** oxyCODONE, prochlorperazine **OR** prochlorperazine, sodium chloride (PF), sodium chloride (PF), sodium chloride 0.9%       PHYSICAL EXAMINATION  Temp:  [97.7  F (36.5  C)-99.3  F (37.4  C)] 98.3  F (36.8  C)  Pulse:  [72-88] 80  Resp:  [13-31] 25  BP: (124-181)/(53-80) 154/60  MAP:  [64 mmHg-89 mmHg] 79 mmHg  Arterial Line BP: ()/(40-76) 136/51  FiO2 (%):  [5 %-50 %] 50 %  SpO2:  [90 %-100  %] 99 %      Neurologic  Mental Status:  awakens to voice, follows some simple commands   Cranial Nerves:  blinks to threat in all fields, PERRL, dysconjugate gaze, tracking examiner, EOMI with normal smooth pursuit, hearing not formally tested but intact to conversation  Motor:  normal muscle tone and bulk, no abnormal movements, subtle movement in right hand, purposeful antigravity movement in LUE, purposeful movement with gravity eliminated in LLE, muscle contraction in RLE   Sensory:   withdraws from noxious stimuli in LUE and LLE, triple flex RLE, grimaces with noxious stimuli in RUE  Coordination:   unable to assess due to encephalopathy   Station/Gait:  deferred    Imaging  I personally reviewed all imaging; relevant findings per HPI.     Lab Results Data   CBC  Recent Labs   Lab 11/01/23  0419 10/31/23  2100 10/31/23  0409 10/30/23  0434   WBC 10.9  --  5.1 8.6   RBC 2.77*  --  2.45* 2.73*   HGB 8.1* 8.0* 7.1* 8.0*   HCT 24.4*  --  21.6* 24.4*   *  --  96* 86*     Basic Metabolic Panel    Recent Labs   Lab 11/01/23  1227 11/01/23  0754 11/01/23  0419 10/31/23  0412 10/31/23  0409 10/30/23  0810 10/30/23  0434   NA  --   --  139  --  140  --  144   POTASSIUM  --   --  4.8  --  4.4  --  5.1   CHLORIDE  --   --  100  --  102  --  108*   CO2  --   --  18*  --  20*  --  15*   BUN  --   --  106.7*  --  82.6*  --  105.4*   CR  --   --  6.72*  --  5.75*  --  8.30*   * 114* 116*   < > 177*   < > 141*   TERESA  --   --  7.4*  --  7.2*  --  7.4*    < > = values in this interval not displayed.     Liver Panel  Recent Labs   Lab 11/01/23  0419 10/31/23  0409 10/30/23  0434   PROTTOTAL 4.5* 4.2* 4.5*   ALBUMIN 2.5* 2.6* 2.5*   BILITOTAL 0.4 0.3 0.3   ALKPHOS 83 89 82   AST 44 73* 207*   ALT 36 47 73*     INR    Recent Labs   Lab Test 10/27/23  1022 10/26/23  0414 10/25/23  2114   INR 1.57* 1.58* 1.73*      Lipid Profile    Recent Labs   Lab Test 05/02/23  1111 05/13/22  1154 01/06/21  1143   CHOL 162 157 162    HDL 44* 47* 41*   LDL 69 71 71   TRIG 245* 196* 248*     A1C    Recent Labs   Lab Test 10/25/23  1629 10/19/23  1053 05/02/23  1111   A1C 6.3* 6.2* 6.3*     Troponin    Recent Labs   Lab 10/26/23  1801 10/26/23  0958 10/26/23  0738   CTROPT 201* 212* 250*          Data   I personally examined and evaluated the patient today. At the time of my evaluation and management the patient was in critical condition today due to ongoing encephalopathy. I personally managed review of labs and images and neuro exam. Key decisions made today included: see plan above. I spent a total of 35 minutes providing critical care services, evaluating the patient, directing care and reviewing laboratory values and radiologic reports.

## 2023-11-01 NOTE — PROGRESS NOTES
[facility] ICU RESPIRATORY NOTE           Date of Admission: 10/25/2023      Date of Intubation (most recent): 10/25/2023     Reason for Mechanical Ventilation: Airway protection      Number of Days on Mechanical Ventilation: 8     Met Criteria for Spontaneous Breathing Trial: No     Reason for No Spontaneous Breathing Trial: Per MD      Significant Events Today: Pt to MRI overnight     ABG Results:   Recent Labs   Lab 10/31/23  0044 10/30/23  1339 10/28/23  0425 10/27/23  2042   PH 7.42 7.51* 7.34* 7.28*   PCO2 33* 27* 32* 36   PO2 92 68* 100 110*   HCO3 21 21 17* 17*   O2PER 50 30 40 45     Vent Mode: CMV/AC  (Continuous Mandatory Ventilation/ Assist Control)  FiO2 (%): 50 %  Resp Rate (Set): 25 breaths/min  Tidal Volume (Set, mL): 400 mL  PEEP (cm H2O): 5 cmH2O  Resp: 17    PETAR Jones, RRT

## 2023-11-02 ENCOUNTER — APPOINTMENT (OUTPATIENT)
Dept: PHYSICAL THERAPY | Facility: CLINIC | Age: 83
DRG: 453 | End: 2023-11-02
Attending: NURSE PRACTITIONER
Payer: COMMERCIAL

## 2023-11-02 ENCOUNTER — APPOINTMENT (OUTPATIENT)
Dept: GENERAL RADIOLOGY | Facility: CLINIC | Age: 83
DRG: 453 | End: 2023-11-02
Attending: INTERNAL MEDICINE
Payer: COMMERCIAL

## 2023-11-02 LAB
ALBUMIN SERPL BCG-MCNC: 2.9 G/DL (ref 3.5–5.2)
ALP SERPL-CCNC: 121 U/L (ref 35–104)
ALT SERPL W P-5'-P-CCNC: 36 U/L (ref 0–50)
ANION GAP SERPL CALCULATED.3IONS-SCNC: 19 MMOL/L (ref 7–15)
AST SERPL W P-5'-P-CCNC: 101 U/L (ref 0–45)
BILIRUB DIRECT SERPL-MCNC: 0.29 MG/DL (ref 0–0.3)
BILIRUB SERPL-MCNC: 0.6 MG/DL
BUN SERPL-MCNC: 75.6 MG/DL (ref 8–23)
CALCIUM SERPL-MCNC: 7.8 MG/DL (ref 8.8–10.2)
CHLORIDE SERPL-SCNC: 98 MMOL/L (ref 98–107)
CREAT SERPL-MCNC: 4.88 MG/DL (ref 0.51–0.95)
DEPRECATED HCO3 PLAS-SCNC: 22 MMOL/L (ref 22–29)
EGFRCR SERPLBLD CKD-EPI 2021: 8 ML/MIN/1.73M2
ERYTHROCYTE [DISTWIDTH] IN BLOOD BY AUTOMATED COUNT: 17 % (ref 10–15)
GLUCOSE BLDC GLUCOMTR-MCNC: 106 MG/DL (ref 70–99)
GLUCOSE BLDC GLUCOMTR-MCNC: 108 MG/DL (ref 70–99)
GLUCOSE BLDC GLUCOMTR-MCNC: 108 MG/DL (ref 70–99)
GLUCOSE BLDC GLUCOMTR-MCNC: 114 MG/DL (ref 70–99)
GLUCOSE BLDC GLUCOMTR-MCNC: 121 MG/DL (ref 70–99)
GLUCOSE BLDC GLUCOMTR-MCNC: 96 MG/DL (ref 70–99)
GLUCOSE SERPL-MCNC: 115 MG/DL (ref 70–99)
HCT VFR BLD AUTO: 24.5 % (ref 35–47)
HGB BLD-MCNC: 8.1 G/DL (ref 11.7–15.7)
MAGNESIUM SERPL-MCNC: 2.1 MG/DL (ref 1.7–2.3)
MCH RBC QN AUTO: 28.6 PG (ref 26.5–33)
MCHC RBC AUTO-ENTMCNC: 33.1 G/DL (ref 31.5–36.5)
MCV RBC AUTO: 87 FL (ref 78–100)
PHOSPHATE SERPL-MCNC: 5.8 MG/DL (ref 2.5–4.5)
PLATELET # BLD AUTO: 156 10E3/UL (ref 150–450)
POTASSIUM SERPL-SCNC: 4.1 MMOL/L (ref 3.4–5.3)
PROT SERPL-MCNC: 5 G/DL (ref 6.4–8.3)
RBC # BLD AUTO: 2.83 10E6/UL (ref 3.8–5.2)
SODIUM SERPL-SCNC: 139 MMOL/L (ref 135–145)
WBC # BLD AUTO: 16.7 10E3/UL (ref 4–11)

## 2023-11-02 PROCEDURE — 97163 PT EVAL HIGH COMPLEX 45 MIN: CPT | Mod: GP

## 2023-11-02 PROCEDURE — 83735 ASSAY OF MAGNESIUM: CPT | Performed by: INTERNAL MEDICINE

## 2023-11-02 PROCEDURE — 94668 MNPJ CHEST WALL SBSQ: CPT

## 2023-11-02 PROCEDURE — 85027 COMPLETE CBC AUTOMATED: CPT | Performed by: INTERNAL MEDICINE

## 2023-11-02 PROCEDURE — 272N000064 HC CIRCUIT HUMIDITY W/CPAP BIPAP

## 2023-11-02 PROCEDURE — 97530 THERAPEUTIC ACTIVITIES: CPT | Mod: GP

## 2023-11-02 PROCEDURE — 999N000253 HC STATISTIC WEANING TRIALS

## 2023-11-02 PROCEDURE — 999N000157 HC STATISTIC RCP TIME EA 10 MIN

## 2023-11-02 PROCEDURE — 250N000011 HC RX IP 250 OP 636: Performed by: INTERNAL MEDICINE

## 2023-11-02 PROCEDURE — 200N000001 HC R&B ICU

## 2023-11-02 PROCEDURE — 84100 ASSAY OF PHOSPHORUS: CPT | Performed by: INTERNAL MEDICINE

## 2023-11-02 PROCEDURE — 250N000009 HC RX 250: Performed by: ANESTHESIOLOGY

## 2023-11-02 PROCEDURE — 94003 VENT MGMT INPAT SUBQ DAY: CPT

## 2023-11-02 PROCEDURE — 999N000065 XR ABDOMEN PORT 1 VIEW

## 2023-11-02 PROCEDURE — 94667 MNPJ CHEST WALL 1ST: CPT

## 2023-11-02 PROCEDURE — 82248 BILIRUBIN DIRECT: CPT | Performed by: INTERNAL MEDICINE

## 2023-11-02 PROCEDURE — 250N000013 HC RX MED GY IP 250 OP 250 PS 637: Performed by: INTERNAL MEDICINE

## 2023-11-02 PROCEDURE — 250N000009 HC RX 250: Performed by: INTERNAL MEDICINE

## 2023-11-02 PROCEDURE — 999N000259 HC STATISTIC EXTUBATION

## 2023-11-02 PROCEDURE — 99291 CRITICAL CARE FIRST HOUR: CPT | Performed by: INTERNAL MEDICINE

## 2023-11-02 PROCEDURE — 99233 SBSQ HOSP IP/OBS HIGH 50: CPT | Performed by: STUDENT IN AN ORGANIZED HEALTH CARE EDUCATION/TRAINING PROGRAM

## 2023-11-02 PROCEDURE — 94640 AIRWAY INHALATION TREATMENT: CPT

## 2023-11-02 PROCEDURE — 82040 ASSAY OF SERUM ALBUMIN: CPT | Performed by: INTERNAL MEDICINE

## 2023-11-02 PROCEDURE — 94640 AIRWAY INHALATION TREATMENT: CPT | Mod: 76

## 2023-11-02 PROCEDURE — 999N000009 HC STATISTIC AIRWAY CARE

## 2023-11-02 PROCEDURE — 272N000063 HC CIRCUIT HUMID FACE/TRACH MSK

## 2023-11-02 PROCEDURE — 86704 HEP B CORE ANTIBODY TOTAL: CPT | Performed by: STUDENT IN AN ORGANIZED HEALTH CARE EDUCATION/TRAINING PROGRAM

## 2023-11-02 RX ORDER — ACETYLCYSTEINE 200 MG/ML
2 SOLUTION ORAL; RESPIRATORY (INHALATION) EVERY 4 HOURS
Status: DISCONTINUED | OUTPATIENT
Start: 2023-11-02 | End: 2023-11-03

## 2023-11-02 RX ORDER — IPRATROPIUM BROMIDE AND ALBUTEROL SULFATE 2.5; .5 MG/3ML; MG/3ML
3 SOLUTION RESPIRATORY (INHALATION) EVERY 4 HOURS
Status: DISCONTINUED | OUTPATIENT
Start: 2023-11-02 | End: 2023-11-06

## 2023-11-02 RX ADMIN — ACETYLCYSTEINE 2 ML: 200 INHALANT RESPIRATORY (INHALATION) at 16:05

## 2023-11-02 RX ADMIN — NYSTATIN: 100000 CREAM TOPICAL at 21:30

## 2023-11-02 RX ADMIN — ACETYLCYSTEINE 2 ML: 200 INHALANT RESPIRATORY (INHALATION) at 11:26

## 2023-11-02 RX ADMIN — FENTANYL CITRATE 25 MCG: 50 INJECTION INTRAMUSCULAR; INTRAVENOUS at 03:10

## 2023-11-02 RX ADMIN — IPRATROPIUM BROMIDE AND ALBUTEROL SULFATE 3 ML: .5; 3 SOLUTION RESPIRATORY (INHALATION) at 19:49

## 2023-11-02 RX ADMIN — DILTIAZEM HYDROCHLORIDE 60 MG: 60 TABLET, FILM COATED ORAL at 05:25

## 2023-11-02 RX ADMIN — Medication 5 ML: at 08:01

## 2023-11-02 RX ADMIN — FENTANYL CITRATE 25 MCG: 50 INJECTION INTRAMUSCULAR; INTRAVENOUS at 05:33

## 2023-11-02 RX ADMIN — ACETYLCYSTEINE 2 ML: 200 INHALANT RESPIRATORY (INHALATION) at 19:49

## 2023-11-02 RX ADMIN — IPRATROPIUM BROMIDE AND ALBUTEROL SULFATE 3 ML: .5; 3 SOLUTION RESPIRATORY (INHALATION) at 11:26

## 2023-11-02 RX ADMIN — NYSTATIN: 100000 CREAM TOPICAL at 08:01

## 2023-11-02 RX ADMIN — IPRATROPIUM BROMIDE AND ALBUTEROL SULFATE 3 ML: .5; 3 SOLUTION RESPIRATORY (INHALATION) at 23:24

## 2023-11-02 RX ADMIN — ACETYLCYSTEINE 2 ML: 200 INHALANT RESPIRATORY (INHALATION) at 23:24

## 2023-11-02 RX ADMIN — LEVOTHYROXINE SODIUM 100 MCG: 100 TABLET ORAL at 08:01

## 2023-11-02 RX ADMIN — Medication 40 MG: at 08:01

## 2023-11-02 RX ADMIN — HYDRALAZINE HYDROCHLORIDE 10 MG: 10 TABLET ORAL at 05:25

## 2023-11-02 RX ADMIN — IPRATROPIUM BROMIDE AND ALBUTEROL SULFATE 3 ML: .5; 3 SOLUTION RESPIRATORY (INHALATION) at 16:04

## 2023-11-02 RX ADMIN — MICONAZOLE NITRATE: 2 POWDER TOPICAL at 08:01

## 2023-11-02 RX ADMIN — MEROPENEM 500 MG: 500 INJECTION, POWDER, FOR SOLUTION INTRAVENOUS at 11:46

## 2023-11-02 RX ADMIN — CHLORHEXIDINE GLUCONATE 15 ML: 1.2 RINSE ORAL at 08:01

## 2023-11-02 ASSESSMENT — ACTIVITIES OF DAILY LIVING (ADL)
ADLS_ACUITY_SCORE: 44

## 2023-11-02 NOTE — PROGRESS NOTES
CLINICAL NUTRITION SERVICES - REASSESSMENT NOTE      Recommendations Ordered by Registered Dietitian (RD):   Resume TF regimen if oral intake </=60% for 1-3 days.  If medically indicated, advance diet, as appropriate.   Future/Additional Recommendations:   Type of Feeding Tube: NG  Enteral Frequency:  Continuous  Enteral Regimen: Promote at 70 mL/hr x 22 hours (hold TF 1 hour before and 1 hour after Synthroid administration)  Total Enteral Provisions: 1540 kcal (15 kcal/kg), 95 g protein (1.7 g/kg), 200 g CHO, 0 g fiber, 1292 mL H2O  Free Water Flush: 30 mL every 4 hours    Malnutrition:   % Weight Loss:  None noted  % Intake:  </= 50% for >/= 5 days (severe malnutrition)  Subcutaneous Fat Loss:  None observed (10/28)  Muscle Loss:  None observed (10/28)  Fluid Retention:  Moderate 3+ generalized      Malnutrition Diagnosis: Moderate malnutrition  In Context of:  Acute illness or injury        EVALUATION OF PROGRESS TOWARD GOALS   Diet:  NPO (with expected SLP evaluation)    Nutrition Support:    10/30: TF resumed @ 30 mL/hr  10/31: TF held d/t suspected hiatal hernia  11/1: TF resumed @ 10 mL/hr  11/2: TF held d/t OG pull at time of extubation    Intake/Tolerance:    K normal, Mg normal, Phos 5.8 (H) - LISA with ATN, HD initiated  BUN 75.6 (H), Cr 4.88 (H) - LISA with ATN, HD initiated  BGM  (10/30-11/2)  I/O 1130/3840, wt 101.2 kg (up from admit) - Getting IV Lasix  10/30: First BMs, loose/soft (2-4x/day since)    ASSESSED NUTRITION NEEDS:  Dosing Weight:   106.4 kg ABW for energy and 56.8 kg IBW for protein      Estimated Energy Needs: 5799-5248 kcals (14-17 Kcal/Kg)  Justification: obese   Estimated Protein Needs: + grams protein (1.5 - 2+ g pro/Kg)  Justification: obesity guidelines + HD     NEW FINDINGS:   10/31: Single lumen midline placement, confirmed sepsis  11/1: HD completed    Previous Goals:   Goal TF regimen will meet % needs    Evaluation: Not met    Previous Nutrition Diagnosis:    Inadequate protein-energy intake related to NPO with plans to resume TF today as evidenced by meeting 0% protein and energy needs   Evaluation: No change, revised      MALNUTRITION  % Weight Loss:  None noted  % Intake:  </= 50% for >/= 5 days (severe malnutrition)  Subcutaneous Fat Loss:  None observed (10/28)  Muscle Loss:  None observed (10/28)  Fluid Retention:  Moderate 3+ generalized      Malnutrition Diagnosis: Moderate malnutrition  In Context of:  Acute illness or injury    CURRENT NUTRITION DIAGNOSIS  Inadequate protein-energy intake related to NPO status as evidenced by inconsistent administration of TF regimen d/t procedures, OG tube removal, and </=50% of intake for >/= 5 days.    INTERVENTIONS  Recommendations / Nutrition Prescription  If inadequate oral intake, resume TF regimen to promote calorie and protein intake.    Implementation  EN Schedule: Resume TF regimen if oral intake </=60% for 1-3 days.  Composition of meals/snacks: If medically indicated, advance diet, as appropriate.  Collaboration and Referral of Nutrition care: ICU Rounds    Goals  Patient to receive nutrition orally or via tubefeeding within 24-48 hours.    MONITORING AND EVALUATION:  Progress towards goals will be monitored and evaluated per protocol and Practice Guidelines    Pat Low  Dietetic Intern

## 2023-11-02 NOTE — PROGRESS NOTES
Renal Medicine Progress Note            Assessment/Plan:     Assessment: Mya Hui is a 83-year-old woman with PMH DM2, hypothyroidism, HLD, HTN admitted 10/25/2023 due to hemorrhagic shock after lumbar spinal fusion.  Course complicated by LISA and encephalopathy.    Nonoliguric LISA, worsening  CKD IIIA  Anasarca  Hypoalbuminemia  Baseline creatinine 1.1-1.4, EGFR 45-50.  No history of appreciable proteinuria.  Worsening creatinine in setting of hemorrhagic shock, most currently has ATN as a result.  Has developed fluid overload, Lasix drip started with good output, however renal function continues to have anephric rising creatinine daily despite this.  She has a rising BUN and worsening acidosis, reasonable to start dialysis to improve mentation and improve fluid status.  -Tunneled dialysis catheter placed by IR on 10/31st   - HD session 11/3  -Daily BMP  -Renally dose meds    Anion gap metabolic acidosis, improved  Largely due to renal failure.     Hemorrhagic shock, resolved  S/p lumbar fusion  Shock liver, improved  Underwent lumbar fusion on 10/25, had 2 L blood loss during surgery.  Developed hemorrhagic shock thereafter.  Multiple transfusions and pressors, currently not requiring pressors.  ELY drains still in place.    Enterobacter PNA   Sputum cx with enterobacter. Zosyn switched to meropenem 11/1.     Plan/Recs:  1) HD tomorrow , continued plan for aggressive UF   2) will obtain outpatient dialysis labs in the event she will discharge in near future     Reviewed notes from ROULA, intensivist, RNs.    Chance Chacon MD   [facility]  Office: [phone]          Interval History:     More awake today, following commands. Planning for extubation today   Decreasing UOP, currently minimal   Dialysis went well yesterday with 3.5L removed            Medications and Allergies:      - MEDICATION INSTRUCTIONS for Dialysis Patients -   Does not apply See Admin Instructions    acetylcysteine  2  "mL Nebulization Q4H    B and C vitamin Complex with folic acid  5 mL Per Feeding Tube Daily    chlorhexidine  15 mL Mouth/Throat Q12H    diltiazem  60 mg Oral or Feeding Tube Q6H NATACHA    hydrALAZINE  10 mg Oral or Feeding Tube Q8H    insulin aspart  1-12 Units Subcutaneous Q4H    ipratropium - albuterol 0.5 mg/2.5 mg/3 mL  3 mL Nebulization Q4H    levothyroxine  100 mcg Oral or NG Tube QAM AC    meropenem  500 mg Intravenous Q24H    nystatin   Topical BID    pantoprazole  40 mg Per Feeding Tube QAM AC    Or    pantoprazole  40 mg Intravenous QAM AC    polyethylene glycol  17 g Oral or NG Tube Daily    senna-docusate  1 tablet Oral or NG Tube BID    sodium chloride (PF)  10 mL Intracatheter Q8H    sodium chloride (PF)  3 mL Intracatheter Q8H        Allergies   Allergen Reactions    Fentanyl Nausea and Vomiting     VERY sensitive to most narcotics if not all.    Morphine And Related Nausea            Physical Exam:   Vitals were reviewed  /67   Pulse 84   Temp 97.9  F (36.6  C) (Axillary)   Resp 26   Ht 1.651 m (5' 5\")   Wt 101.2 kg (223 lb 1.7 oz)   LMP  (LMP Unknown)   SpO2 92%   BMI 37.13 kg/m      Wt Readings from Last 3 Encounters:   11/02/23 101.2 kg (223 lb 1.7 oz)   10/19/23 95 kg (209 lb 8 oz)   09/28/23 97.1 kg (214 lb)       Intake/Output Summary (Last 24 hours) at 10/30/2023 0922  Last data filed at 10/30/2023 0800  Gross per 24 hour   Intake 1717.43 ml   Output 1370 ml   Net 347.43 ml       GENERAL APPEARANCE: intubated, NAD   HEENT: ETT in place   RESP: coarse bilaterally  CV: RRR, no murmur  ABDOMEN: soft, nontender  EXTREMITIES/SKIN: Anasarca, 2+ pitting edema in lower extremities.  Weeping of dependent areas  NEURO:  sedated            Data:     BMP  Recent Labs   Lab 11/02/23  1144 11/02/23  0800 11/02/23  0521 11/01/23  0754 11/01/23  0419 10/31/23  0412 10/31/23  0409 10/30/23  0810 10/30/23  0434   NA  --   --  139  --  139  --  140  --  144   POTASSIUM  --   --  4.1  --  4.8  --  4.4 "  --  5.1   CHLORIDE  --   --  98  --  100  --  102  --  108*   TERESA  --   --  7.8*  --  7.4*  --  7.2*  --  7.4*   CO2  --   --  22  --  18*  --  20*  --  15*   BUN  --   --  75.6*  --  106.7*  --  82.6*  --  105.4*   CR  --   --  4.88*  --  6.72*  --  5.75*  --  8.30*   * 121* 108*  115*   < > 116*   < > 177*   < > 141*    < > = values in this interval not displayed.     CBC  Recent Labs   Lab 11/02/23  0521 11/01/23  0419 10/31/23  2100 10/31/23  0409 10/30/23  0434   WBC 16.7* 10.9  --  5.1 8.6   HGB 8.1* 8.1* 8.0* 7.1* 8.0*   HCT 24.5* 24.4*  --  21.6* 24.4*   MCV 87 88  --  88 89    117*  --  96* 86*     Lab Results   Component Value Date     (H) 11/02/2023    ALT 36 11/02/2023    ALKPHOS 121 (H) 11/02/2023    BILITOTAL 0.6 11/02/2023    CHARLETTE 42 10/31/2023     Lab Results   Component Value Date    INR 1.57 (H) 10/27/2023     Color Urine (no units)   Date Value   10/26/2023 Yellow   03/08/2019 Yellow     Appearance Urine (no units)   Date Value   10/26/2023 Slightly Cloudy (A)   03/08/2019 Cloudy     Glucose Urine (mg/dL)   Date Value   10/26/2023 500 (A)   03/08/2019 Negative     Bilirubin Urine (no units)   Date Value   10/26/2023 Negative   03/08/2019 Negative     Ketones Urine (mg/dL)   Date Value   10/26/2023 Trace (A)   03/08/2019 Negative     Specific Gravity Urine (no units)   Date Value   10/26/2023 1.024   03/08/2019 1.020     pH Urine   Date Value   10/26/2023 5.5   03/08/2019 5.0 pH     Protein Albumin Urine (mg/dL)   Date Value   10/26/2023 300 (A)   03/08/2019 100 (A)     Urobilinogen Urine   Date Value   01/17/2022 0.2 E.U./dL   08/10/2017 0.2 EU/dL     Nitrite Urine (no units)   Date Value   10/26/2023 Negative   03/08/2019 Negative     Leukocyte Esterase Urine (no units)   Date Value   10/26/2023 Negative   03/08/2019 Moderate (A)         Attestation:  I have reviewed today's vital signs, notes, medications, labs and imaging.    Chance Chacon MD  [facility]  - Nephrology  Office: [phone]

## 2023-11-02 NOTE — PLAN OF CARE
Patient becoming more awake, following simple commands although weak. Reported pain twice last night by nodding her head, moves all extrem. L stronger then R. Purposeful to ETT, Desatted later in the shift, lots of secretions. Continue to move towards extubation, NSR, HTN with movement/coughs. Positional arterial line, edematous, skin breakdown in folds/nystatin ordered. Voided x1 and 2 loose Bms. Trickle feed running. Repositioned/oral care complete.    Plan: up to chair, aid in secretions, monitor ELY output, change dressing on back with surgery.

## 2023-11-02 NOTE — PROGRESS NOTES
RT was called to bedside to place pt on CPAP due to SpO2 dropping into the mid 80's while pt was sleeping. Pt was placed on CPAP 8 50% with SpO2 in the mid 90's. After about 10 minutes pt woke up and pulled mask off. Pt refused to go back on CPAP. Pt is currently on a 8L oxymask with SpO2 in the low to mid 90's.  Will cont to  monitor.  11/2/2023  Rhina Hines, RT

## 2023-11-02 NOTE — PROGRESS NOTES
[facility]    Neurosurgery  Daily Note    Assessment & Plan   Procedure(s):  Thoracic 11 to pelvis robotic decompression and fusion   8 Days Post-Op  Per RN, doing well and follows commands. Output from left drain overnight 40mls. Plan for extubation today.     Plan:  -OK for DVT ppx, confirmed with Dr. Waite  -OK for chest PT, confirmed with Dr. Waite  - Medical cares per Intensivist team, appreciate assistance  - Pain control measures as needed  - Continue abx   - Routine wound care. Continue left drain for now and monitor output   - PT/OT  - Once medically stable, will need upright xray while wearing TLSO brace   - Appreciate assistance from specialties      Dr. Waite in agreement    Irina Matute PA-C  [facility] Neurosurgery  [facility]  [address]  [address] [place] [id number]    Tel [phone]  Pager [phone]    Principal Problem:    S/P lumbar spinal fusion  Active Problems:    Acute kidney failure with tubular necrosis (H)    Encephalopathy     Irina Matute PA-C    Interval History   Improving    Physical Exam   Temp: 97.9  F (36.6  C) Temp src: Axillary BP: 131/68 Pulse: 80   Resp: 16 SpO2: 95 % O2 Device: Oxymask Oxygen Delivery: 6 LPM  Vitals:    10/31/23 0400 11/01/23 0400 11/02/23 0525   Weight: 229 lb 6.4 oz (104.1 kg) 233 lb 7.5 oz (105.9 kg) 223 lb 1.7 oz (101.2 kg)     Vital Signs with Ranges  Temp:  [97.7  F (36.5  C)-99.2  F (37.3  C)] 97.9  F (36.6  C)  Pulse:  [69-91] 80  Resp:  [12-33] 16  BP: (131-157)/(58-68) 131/68  MAP:  [64 mmHg-117 mmHg] 99 mmHg  Arterial Line BP: (101-184)/(40-98) 162/44  FiO2 (%):  [40 %-50 %] 40 %  SpO2:  [91 %-100 %] 95 %  I/O last 3 completed shifts:  In: 1250 [I.V.:460; Other:300; NG/GT:360]  Out: 3615 [Urine:25; Drains:90; Other:3500]    Awake, follows commands  RENTERIA on command  Dressing is dry, no new drainage. Dried drainage mid incision. Drain secure and intact      Medications    dextrose       dextrose      HYDROmorphone Stopped (10/27/23 1048)    norepinephrine Stopped (10/27/23 1937)    sodium chloride 10 mL/hr at 11/01/23 1800       - MEDICATION INSTRUCTIONS for Dialysis Patients -   Does not apply See Admin Instructions    acetylcysteine  4 mL Nebulization Q4H    B and C vitamin Complex with folic acid  5 mL Per Feeding Tube Daily    chlorhexidine  15 mL Mouth/Throat Q12H    diltiazem  60 mg Oral or Feeding Tube Q6H NATACHA    hydrALAZINE  10 mg Oral or Feeding Tube Q8H    insulin aspart  1-12 Units Subcutaneous Q4H    levothyroxine  100 mcg Oral or NG Tube QAM AC    meropenem  500 mg Intravenous Q24H    nystatin   Topical BID    pantoprazole  40 mg Per Feeding Tube QAM AC    Or    pantoprazole  40 mg Intravenous QAM AC    polyethylene glycol  17 g Oral or NG Tube Daily    protein modular  1 packet Per Feeding Tube Daily    senna-docusate  1 tablet Oral or NG Tube BID    sodium chloride (PF)  10 mL Intracatheter Q8H    sodium chloride (PF)  3 mL Intracatheter Q8H    vancomycin place kumar - receiving intermittent dosing  1 each Intravenous See Admin Instructions       Plans discussed with Dr. Waite who was in agreement with plans    Irina MILLS [facility]surgery  [address]  [address] [place] [address], [place] [address]    Tel [phone]  Pager [phone]

## 2023-11-02 NOTE — PROGRESS NOTES
Critical Care  Note      11/02/2023    Name: Mya Hui MRN#: [id number]   Age: 83 year old YOB: 1940     Hsptl Day# 8  ICU DAY #    MV DAY #             Problem List:   Principal Problem:    S/P lumbar spinal fusion  Active Problems:    Acute kidney failure with tubular necrosis (H)    Encephalopathy           Summary/Hospital Course:       Interval/overnight events:  - Improved mental status today.  Following commands.  Has acute cough.  - Did a brief SBT and extubated in the morning.  Placed on breathing mask after.  - O2 sats of 97-98%.  - BUN improving to 75.6.  - Sputum culture now growing Enterobacter cloacae, E. coli and Stenotrophomonas maltophilia.      Assessment and plan :     Mya Hui IS a 83 year old female admitted on 10/25/2023 for hemorrhagic shock post lumbar spinal fusion surgery along with LISA and encephalopathy.      I have personally reviewed the daily labs, imaging studies, cultures and discussed the case with referring physician and consulting physicians.      My assessment and plan by system for this patient is as follows:     #S/p lumbar spinal fusion, with prior radicular symptoms and scoliosis  For lumbar scoliosis and radiculopathy  #Hemorrhagic shock  Postprocedure.  Received multiple blood products, remains off vasopressors.  #Possible MI  ST changes noted intraoperatively. Minimal troponin elevation.  Echocardiogram did not show any significant abnormality.  #Acute hypoxic respiratory failure  Remains intubated postoperatively.  Extubated 11/2  #LISA  Nephrology following.  Likely ATN from previous hemorrhagic shock.  Urine output improving.  On HD.  #Encephalopathy  CT head negative.  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  Improving.  #Ventilator associated pneumonia  Sputum culture positive for E. coli, stenotrophomonas and Enterobacter cloacae.  Final sensitivities pending.  #HTN  #DM  #HL  #Obesity  #History of hypothyroid and  normal TSH  #History of CKD  #History of lower extremity edema  #GERD  #ANAYELI  Noncompliant with CPAP.     Plan:  - Continue to monitor CBC daily.  Has been off vasopressors.  - Will need to maintain aggressive pulmonary hygiene measures postextubation to clear out her secretions.  -Start chest physiotherapy, pending clearance from neurosurgery due to recent procedure.  - Start DuoNebs, nebulized Mucomyst.  - Transfuse to keep hemoglobin above 8 with concerns of cardiac ischemia intraoperatively.  - Continue to wean supplemental oxygen to keep sats above 90%.  - Patient's oxygen saturations dropping while she is sleeping.  She has a baseline ANAYELI and is noncompliant with CPAP.  We will use CPAP when she takes naps during the day and at night.  -Continue meropenem, pending final sensitivities from the sputum culture.  -Continue hemodialysis.  Appreciate nephrology recommendations.  BUN/creatinine improving.  - Minimize further sedative/psychotropic meds.  - Speech therapy evaluation for swallowing postextubation.      IV/Access:   1. Venous access - midline, right IJ HD catheter  2. Arterial access -radial A-line     Plan  -Radial A-line can be removed.     ICU Prophylaxis:   1. DVT: mechanical, due to concerns for hemorrhagic shock  2. VAP: HOB 30 degrees, chlorhexidine rinse  3. Stress Ulcer: PPI  4. Restraints: Nonviolent soft two point restraints required and necessary for patient safety and continued cares and good effect as patient continues to pull at necessary lines, tubes despite education and distraction. Will readdress daily.   5. Wound care  -routine wound care post surgery  6. Feeding -enteral nutrition can be restarted today.  7. Family Update: Updated the son at the bedside.  8. Disposition -will stay in the ICU today for monitoring postextubation.       Clinically Significant Risk Factors             # Anion Gap Metabolic Acidosis: Highest Anion Gap = 21 mmol/L in last 2 days, will monitor and treat as  "appropriate  # Hypoalbuminemia: Lowest albumin = 2.5 g/dL at 11/1/2023  4:19 AM, will monitor as appropriate   # Thrombocytopenia: Lowest platelets = 117 in last 2 days, will monitor for bleeding   # Hypertension: Noted on problem list        # Obesity: Estimated body mass index is 37.13 kg/m  as calculated from the following:    Height as of this encounter: 1.651 m (5' 5\").    Weight as of this encounter: 101.2 kg (223 lb 1.7 oz).   # Moderate Malnutrition: based on nutrition assessment    # Financial/Environmental Concerns: none                             Key Medications:      - MEDICATION INSTRUCTIONS for Dialysis Patients -   Does not apply See Admin Instructions    B and C vitamin Complex with folic acid  5 mL Per Feeding Tube Daily    chlorhexidine  15 mL Mouth/Throat Q12H    diltiazem  60 mg Oral or Feeding Tube Q6H NATACHA    hydrALAZINE  10 mg Oral or Feeding Tube Q8H    insulin aspart  1-12 Units Subcutaneous Q4H    levothyroxine  100 mcg Oral or NG Tube QAM AC    meropenem  500 mg Intravenous Q24H    nystatin   Topical BID    pantoprazole  40 mg Per Feeding Tube QAM AC    Or    pantoprazole  40 mg Intravenous QAM AC    polyethylene glycol  17 g Oral or NG Tube Daily    protein modular  1 packet Per Feeding Tube Daily    senna-docusate  1 tablet Oral or NG Tube BID    sodium chloride (PF)  10 mL Intracatheter Q8H    sodium chloride (PF)  3 mL Intracatheter Q8H    vancomycin place kumar - receiving intermittent dosing  1 each Intravenous See Admin Instructions      dextrose      dextrose      HYDROmorphone Stopped (10/27/23 1048)    norepinephrine Stopped (10/27/23 1937)    sodium chloride 10 mL/hr at 11/01/23 1800              Physical Examination:   Temp:  [97.7  F (36.5  C)-99.2  F (37.3  C)] 97.9  F (36.6  C)  Pulse:  [69-91] 69  Resp:  [12-33] 25  BP: (139-157)/(58-68) 154/68  MAP:  [64 mmHg-117 mmHg] 64 mmHg  Arterial Line BP: (101-184)/(40-98) 108/45  FiO2 (%):  [40 %-50 %] 40 %  SpO2:  [91 %-100 %] 94 " %    Intake/Output Summary (Last 24 hours) at 11/2/2023 0837  Last data filed at 11/2/2023 0600  Gross per 24 hour   Intake 1190 ml   Output 3590 ml   Net -2400 ml     Wt Readings from Last 4 Encounters:   11/02/23 101.2 kg (223 lb 1.7 oz)   10/19/23 95 kg (209 lb 8 oz)   09/28/23 97.1 kg (214 lb)   09/14/23 97.1 kg (214 lb)     Arterial Line BP: (101-184)/(40-98) 108/45  MAP:  [64 mmHg-117 mmHg] 64 mmHg  Vent Mode: CMV/AC  (Continuous Mandatory Ventilation/ Assist Control)  FiO2 (%): 40 %  Resp Rate (Set): 25 breaths/min  Tidal Volume (Set, mL): 400 mL  PEEP (cm H2O): 5 cmH2O  Pressure Support (cm H2O): 5 cmH2O  Resp: 25    Recent Labs   Lab 10/31/23  0044 10/30/23  1339 10/28/23  0425 10/27/23  2042   PH 7.42 7.51* 7.34* 7.28*   PCO2 33* 27* 32* 36   PO2 92 68* 100 110*   HCO3 21 21 17* 17*   O2PER 50 30 40 45       GEN: Patient seen postextubation.  Somnolent  HEENT: head ncat, sclera anicteric, OP patent, trachea midline   PULM: Minimal rhonchi present bilaterally.  CV/COR: RRR S1S2 no gallop,  No rub, no murmur  ABD: soft nontender, hypoactive bowel sounds, no mass  EXT:  warm and well perfused x4  NEURO: PERRL, no obvious deficits  SKIN: no obvious rash  LINES: clean, dry intact         Data:   All data and imaging reviewed     ROUTINE ICU LABS (Last four results)  CMP  Recent Labs   Lab 11/02/23  0521 11/02/23  0024 11/01/23 2118 11/01/23  0754 11/01/23  0419 10/31/23  0412 10/31/23  0409 10/30/23  0810 10/30/23  0434     --   --   --  139  --  140  --  144   POTASSIUM 4.1  --   --   --  4.8  --  4.4  --  5.1   CHLORIDE 98  --   --   --  100  --  102  --  108*   CO2 22  --   --   --  18*  --  20*  --  15*   ANIONGAP 19*  --   --   --  21*  --  18*  --  21*   *  115* 106* 94   < > 116*   < > 177*   < > 141*   BUN 75.6*  --   --   --  106.7*  --  82.6*  --  105.4*   CR 4.88*  --   --   --  6.72*  --  5.75*  --  8.30*   GFRESTIMATED 8*  --   --   --  6*  --  7*  --  4*   TERESA 7.8*  --   --   --   "7.4*  --  7.2*  --  7.4*   MAG 2.1  --   --   --  2.3  --  2.1  --  2.8*   PHOS 5.8*  --   --   --  7.0*  --  5.8*  --  6.6*   PROTTOTAL 5.0*  --   --   --  4.5*  --  4.2*  --  4.5*   ALBUMIN 2.9*  --   --   --  2.5*  --  2.6*  --  2.5*   BILITOTAL 0.6  --   --   --  0.4  --  0.3  --  0.3   ALKPHOS 121*  --   --   --  83  --  89  --  82   *  --   --   --  44  --  73*  --  207*   ALT 36  --   --   --  36  --  47  --  73*    < > = values in this interval not displayed.     CBC  Recent Labs   Lab 11/02/23  0521 11/01/23  0419 10/31/23  2100 10/31/23  0409 10/30/23  0434   WBC 16.7* 10.9  --  5.1 8.6   RBC 2.83* 2.77*  --  2.45* 2.73*   HGB 8.1* 8.1* 8.0* 7.1* 8.0*   HCT 24.5* 24.4*  --  21.6* 24.4*   MCV 87 88  --  88 89   MCH 28.6 29.2  --  29.0 29.3   MCHC 33.1 33.2  --  32.9 32.8   RDW 17.0* 17.2*  --  17.5* 17.5*    117*  --  96* 86*     INR  Recent Labs   Lab 10/27/23  1022   INR 1.57*     Arterial Blood Gas  Recent Labs   Lab 10/31/23  0044 10/30/23  1339 10/28/23  0425 10/27/23  2042   PH 7.42 7.51* 7.34* 7.28*   PCO2 33* 27* 32* 36   PO2 92 68* 100 110*   HCO3 21 21 17* 17*   O2PER 50 30 40 45       All cultures:  No results for input(s): \"CULT\" in the last 168 hours.  Recent Results (from the past 24 hour(s))   CT Head w/o Contrast    Narrative    CT SCAN OF THE HEAD WITHOUT CONTRAST   11/1/2023 2:23 PM     HISTORY: Evaluate for hemorrhage. Stroke    TECHNIQUE:  Axial images of the head and coronal reformations without  IV contrast material. Radiation dose for this scan was reduced using  automated exposure control, adjustment of the mA and/or kV according  to patient size, or iterative reconstruction technique.    COMPARISON: Head MRI 10/31/2023, head CT 10/31/2023      Impression    IMPRESSION: No evidence of hemorrhage. No change.    ALYSHA MAHAN MD         SYSTEM ID:  [id number]         Billing: This patient is critically ill: Yes. Total critical care time today 50 min, excluding " procedures.                         LITO Werner   of Medicine  [facility]  Pulmonary, Allergy, Critical Care and Sleep Medicine  Pager - [phone]

## 2023-11-02 NOTE — PROGRESS NOTES
[facility] ICU RESPIRATORY NOTE           Date of Admission: 10/25/2023     Date of Intubation (most recent): 10/25/2023     Reason for Mechanical Ventilation: Airway protection     Number of Days on Mechanical Ventilation: 8     Met Criteria for Spontaneous Breathing Trial: Yes      Significant Events Today: None overnight     ABG Results:   Recent Labs   Lab 10/31/23  0044 10/30/23  1339 10/28/23  0425 10/27/23  2042   PH 7.42 7.51* 7.34* 7.28*   PCO2 33* 27* 32* 36   PO2 92 68* 100 110*   HCO3 21 21 17* 17*   O2PER 50 30 40 45     Vent Mode: CMV/AC  (Continuous Mandatory Ventilation/ Assist Control)  FiO2 (%): 40 %  Resp Rate (Set): 25 breaths/min  Tidal Volume (Set, mL): 400 mL  PEEP (cm H2O): 5 cmH2O  Pressure Support (cm H2O): 5 cmH2O  Resp: 25    PETAR Jones, RRT

## 2023-11-02 NOTE — PROGRESS NOTES
Extubation Note    Successful completion of SBT (Yes or No):Yes  Extubation time:1025    Patient assessment:  Lung sounds:Coarse and diminished  Stridor Present (Yes or No): No  Patient tolerance:Pt has a moderate amount of secretions.    Oxygen device:  6L oxymask  SpO2:95%    Plan:Plan to start CPT and nebs per MD orders.  Will cont to monitor.  11/2/2023  Rhina Hines, RT

## 2023-11-02 NOTE — CARE PLAN
Shift Summary    Neuro: Oriented to self. Follows commands. MARLENE  Cardiac: SR. BP stable  Pulmonary: Extubated 1025 to Oxymask. Oxymask at 6L most of the day. CPAP attempted to be placed when sleeping but patient refused. Chest PT done by RT. Lungs course and pt with a congested cough  GI/: RADHA. Bladder scanned. Pt had one incontinence.  Skin: Back dressing with marked drainage. ELY in place.   Activity: BR  Pain: C/o back pain  Restraints: removed.

## 2023-11-02 NOTE — PROGRESS NOTES
11/02/23 1342   Appointment Info   Signing Clinician's Name / Credentials (PT) Mai Mijares, PT, DPT   Rehab Comments (PT) consider OT referral as appropriate   Living Environment   People in Home alone   Current Living Arrangements apartment   Transportation Anticipated family or friend will provide   Self-Care   Current Activity Tolerance poor   Equipment Currently Used at Home walker, rolling   Fall history within last six months no   Activity/Exercise/Self-Care Comment Per pt, uses a 4WW and has 5 children, lives alone. Pt may be inaccurate historian, not consistent information.   General Information   Onset of Illness/Injury or Date of Surgery 10/25/23   Referring Physician Rhina Gregory, NP   Patient/Family Therapy Goals Statement (PT) pt not able to state   Pertinent History of Current Problem (include personal factors and/or comorbidities that impact the POC) extubated 11/2; Mya Hui is a 83-year-old woman with PMH DM2, hypothyroidism, HLD, HTN admitted 10/25/2023 due to hemorrhagic shock after lumbar spinal fusion.  Course complicated by LISA and encephalopathy.   Existing Precautions/Restrictions spinal;fall   Weight-Bearing Status - LLE full weight-bearing   Weight-Bearing Status - RLE full weight-bearing   Cognition   Affect/Mental Status (Cognition) unable/difficult to assess   Cognitive Status Comments pt able to state name, falling asleep/ not answering other questions.   Pain Assessment   Patient Currently in Pain   (no overt indications of pain)   Integumentary/Edema   Integumentary/Edema Comments incision CDI; otherwise WFL   Posture    Posture Comments unable to assess   Range of Motion (ROM)   ROM Comment stiff B LE   Strength (Manual Muscle Testing)   Strength Comments significant B LE weakness, unable to lift against gravity. demonstrates 1/5 strength in ankle dorsiflexors and quads   Bed Mobility   Comment, (Bed Mobility) max assist of 2 rolling   Transfers   Comment,  (Transfers) dependent   Gait/Stairs (Locomotion)   Comment, (Gait/Stairs) unable d/t weakness/ alertness   Clinical Impression   Criteria for Skilled Therapeutic Intervention Yes, treatment indicated   PT Diagnosis (PT) impaired functional mobility   Influenced by the following impairments decreased activity tolerance, balance, strength; precautions   Functional limitations due to impairments bed mobility, transfers, ambulation   Clinical Presentation (PT Evaluation Complexity) unstable   Clinical Presentation Rationale clinical judgement   Clinical Decision Making (Complexity) high complexity   Planned Therapy Interventions (PT) balance training;bed mobility training;gait training;home exercise program;manual therapy techniques;motor coordination training;neuromuscular re-education;patient/family education;stair training;strengthening;transfer training;stretching   Risk & Benefits of therapy have been explained evaluation/treatment results reviewed;care plan/treatment goals reviewed;risks/benefits reviewed;current/potential barriers reviewed;participants voiced agreement with care plan;participants included;patient   PT Total Evaluation Time   PT Eval, High Complexity Minutes (35188) 10   Physical Therapy Goals   PT Frequency Daily   PT Predicted Duration/Target Date for Goal Attainment 11/09/23   PT Goals Bed Mobility;Transfers;Gait   PT: Bed Mobility Modified independent;Within precautions   PT: Transfers Supervision/stand-by assist;Sit to/from stand;Bed to/from chair;Assistive device   PT: Gait Supervision/stand-by assist;Rolling walker;100 feet   Interventions   Interventions Quick Adds Therapeutic Activity   Therapeutic Activity   Therapeutic Activities: dynamic activities to improve functional performance Minutes (60885) 10   Symptoms Noted During/After Treatment   (no overt indications of pain)   Treatment Detail/Skilled Intervention O2 6L oxymask, was extubated this am. Okay for therapy as able as she is  still waking up. In supine, pt awake at first, able to squeeze hands and wiggle feet. Variable B LE movement but not functional. Rolling side to side with max assist where pt is able to remain in position on side once placed. Second assist for ramona cares and replacing chux pads. Total assist of 2 supine scoot. Pt asleep and only wakes briefly after cleaned up. RN aware.   PT Discharge Planning   PT Plan progress mobility w/ assist of 2; bed mob, transfers   PT Discharge Recommendation (DC Rec) Acute Rehab Center-Motivated patient will benefit from intensive, interdisciplinary therapy.  Anticipate will be able to tolerate 3 hours of therapy per day   PT Rationale for DC Rec Patient w/ significantly decreased functional mobility compared to baseline primarly affected by decreased functional strength. Pending progress and tolerance with rehab, anticipated to benefit from intensive rehab to improve independence and safety with functional mobility.   PT Brief overview of current status max-dependent   Total Session Time   Timed Code Treatment Minutes 10   Total Session Time (sum of timed and untimed services) 20

## 2023-11-03 ENCOUNTER — APPOINTMENT (OUTPATIENT)
Dept: PHYSICAL THERAPY | Facility: CLINIC | Age: 83
DRG: 453 | End: 2023-11-03
Attending: NEUROLOGICAL SURGERY
Payer: COMMERCIAL

## 2023-11-03 ENCOUNTER — APPOINTMENT (OUTPATIENT)
Dept: SPEECH THERAPY | Facility: CLINIC | Age: 83
DRG: 453 | End: 2023-11-03
Attending: INTERNAL MEDICINE
Payer: COMMERCIAL

## 2023-11-03 LAB
ALBUMIN SERPL BCG-MCNC: 2.7 G/DL (ref 3.5–5.2)
ALP SERPL-CCNC: 141 U/L (ref 35–104)
ALT SERPL W P-5'-P-CCNC: 38 U/L (ref 0–50)
ANION GAP SERPL CALCULATED.3IONS-SCNC: 22 MMOL/L (ref 7–15)
AST SERPL W P-5'-P-CCNC: 140 U/L (ref 0–45)
ATRIAL RATE - MUSE: 78 BPM
BILIRUB DIRECT SERPL-MCNC: 0.33 MG/DL (ref 0–0.3)
BILIRUB SERPL-MCNC: 0.5 MG/DL
BUN SERPL-MCNC: 90.1 MG/DL (ref 8–23)
CALCIUM SERPL-MCNC: 7.9 MG/DL (ref 8.8–10.2)
CHLORIDE SERPL-SCNC: 96 MMOL/L (ref 98–107)
CREAT SERPL-MCNC: 2.66 MG/DL (ref 0.51–0.95)
CREAT SERPL-MCNC: 6.37 MG/DL (ref 0.51–0.95)
DEPRECATED HCO3 PLAS-SCNC: 22 MMOL/L (ref 22–29)
DIASTOLIC BLOOD PRESSURE - MUSE: NORMAL MMHG
EGFRCR SERPLBLD CKD-EPI 2021: 17 ML/MIN/1.73M2
EGFRCR SERPLBLD CKD-EPI 2021: 6 ML/MIN/1.73M2
ERYTHROCYTE [DISTWIDTH] IN BLOOD BY AUTOMATED COUNT: 17 % (ref 10–15)
GLUCOSE BLDC GLUCOMTR-MCNC: 79 MG/DL (ref 70–99)
GLUCOSE BLDC GLUCOMTR-MCNC: 82 MG/DL (ref 70–99)
GLUCOSE BLDC GLUCOMTR-MCNC: 84 MG/DL (ref 70–99)
GLUCOSE BLDC GLUCOMTR-MCNC: 92 MG/DL (ref 70–99)
GLUCOSE BLDC GLUCOMTR-MCNC: 93 MG/DL (ref 70–99)
GLUCOSE SERPL-MCNC: 95 MG/DL (ref 70–99)
HBV CORE AB SERPL QL IA: NONREACTIVE
HCT VFR BLD AUTO: 25.4 % (ref 35–47)
HGB BLD-MCNC: 8.1 G/DL (ref 11.7–15.7)
HOLD SPECIMEN: NORMAL
INTERPRETATION ECG - MUSE: NORMAL
MAGNESIUM SERPL-MCNC: 2.2 MG/DL (ref 1.7–2.3)
MCH RBC QN AUTO: 28.7 PG (ref 26.5–33)
MCHC RBC AUTO-ENTMCNC: 31.9 G/DL (ref 31.5–36.5)
MCV RBC AUTO: 90 FL (ref 78–100)
P AXIS - MUSE: 85 DEGREES
PHOSPHATE SERPL-MCNC: 8.6 MG/DL (ref 2.5–4.5)
PLATELET # BLD AUTO: 183 10E3/UL (ref 150–450)
POTASSIUM SERPL-SCNC: 4 MMOL/L (ref 3.4–5.3)
POTASSIUM SERPL-SCNC: 4.1 MMOL/L (ref 3.4–5.3)
PR INTERVAL - MUSE: 128 MS
PROCALCITONIN SERPL IA-MCNC: 3.36 NG/ML
PROT SERPL-MCNC: 5.1 G/DL (ref 6.4–8.3)
QRS DURATION - MUSE: 88 MS
QT - MUSE: 408 MS
QTC - MUSE: 465 MS
R AXIS - MUSE: 13 DEGREES
RBC # BLD AUTO: 2.82 10E6/UL (ref 3.8–5.2)
SODIUM SERPL-SCNC: 140 MMOL/L (ref 135–145)
SYSTOLIC BLOOD PRESSURE - MUSE: NORMAL MMHG
T AXIS - MUSE: 38 DEGREES
VENTRICULAR RATE- MUSE: 78 BPM
WBC # BLD AUTO: 18.5 10E3/UL (ref 4–11)

## 2023-11-03 PROCEDURE — 250N000009 HC RX 250: Performed by: INTERNAL MEDICINE

## 2023-11-03 PROCEDURE — 94640 AIRWAY INHALATION TREATMENT: CPT

## 2023-11-03 PROCEDURE — 250N000013 HC RX MED GY IP 250 OP 250 PS 637: Performed by: INTERNAL MEDICINE

## 2023-11-03 PROCEDURE — 90935 HEMODIALYSIS ONE EVALUATION: CPT

## 2023-11-03 PROCEDURE — 250N000011 HC RX IP 250 OP 636: Mod: JZ | Performed by: INTERNAL MEDICINE

## 2023-11-03 PROCEDURE — 84100 ASSAY OF PHOSPHORUS: CPT | Performed by: INTERNAL MEDICINE

## 2023-11-03 PROCEDURE — 999N000157 HC STATISTIC RCP TIME EA 10 MIN

## 2023-11-03 PROCEDURE — 92610 EVALUATE SWALLOWING FUNCTION: CPT | Mod: GN | Performed by: SPEECH-LANGUAGE PATHOLOGIST

## 2023-11-03 PROCEDURE — 258N000003 HC RX IP 258 OP 636: Performed by: STUDENT IN AN ORGANIZED HEALTH CARE EDUCATION/TRAINING PROGRAM

## 2023-11-03 PROCEDURE — 85027 COMPLETE CBC AUTOMATED: CPT | Performed by: INTERNAL MEDICINE

## 2023-11-03 PROCEDURE — 99223 1ST HOSP IP/OBS HIGH 75: CPT | Performed by: PHYSICIAN ASSISTANT

## 2023-11-03 PROCEDURE — 250N000011 HC RX IP 250 OP 636: Performed by: INTERNAL MEDICINE

## 2023-11-03 PROCEDURE — 83735 ASSAY OF MAGNESIUM: CPT | Performed by: INTERNAL MEDICINE

## 2023-11-03 PROCEDURE — 99233 SBSQ HOSP IP/OBS HIGH 50: CPT | Performed by: STUDENT IN AN ORGANIZED HEALTH CARE EDUCATION/TRAINING PROGRAM

## 2023-11-03 PROCEDURE — 250N000013 HC RX MED GY IP 250 OP 250 PS 637: Performed by: PHYSICIAN ASSISTANT

## 2023-11-03 PROCEDURE — 94640 AIRWAY INHALATION TREATMENT: CPT | Mod: 76

## 2023-11-03 PROCEDURE — 99418 PROLNG IP/OBS E/M EA 15 MIN: CPT | Performed by: PHYSICIAN ASSISTANT

## 2023-11-03 PROCEDURE — 82565 ASSAY OF CREATININE: CPT | Performed by: INTERNAL MEDICINE

## 2023-11-03 PROCEDURE — C9113 INJ PANTOPRAZOLE SODIUM, VIA: HCPCS | Mod: JZ | Performed by: INTERNAL MEDICINE

## 2023-11-03 PROCEDURE — 99232 SBSQ HOSP IP/OBS MODERATE 35: CPT | Performed by: INTERNAL MEDICINE

## 2023-11-03 PROCEDURE — 84145 PROCALCITONIN (PCT): CPT | Performed by: PHYSICIAN ASSISTANT

## 2023-11-03 PROCEDURE — 250N000009 HC RX 250: Performed by: ANESTHESIOLOGY

## 2023-11-03 PROCEDURE — 634N000001 HC RX 634: Mod: JZ | Performed by: STUDENT IN AN ORGANIZED HEALTH CARE EDUCATION/TRAINING PROGRAM

## 2023-11-03 PROCEDURE — 82248 BILIRUBIN DIRECT: CPT | Performed by: INTERNAL MEDICINE

## 2023-11-03 PROCEDURE — 250N000011 HC RX IP 250 OP 636: Performed by: STUDENT IN AN ORGANIZED HEALTH CARE EDUCATION/TRAINING PROGRAM

## 2023-11-03 PROCEDURE — 200N000001 HC R&B ICU

## 2023-11-03 PROCEDURE — 84132 ASSAY OF SERUM POTASSIUM: CPT | Performed by: NEUROLOGICAL SURGERY

## 2023-11-03 PROCEDURE — 86481 TB AG RESPONSE T-CELL SUSP: CPT | Performed by: STUDENT IN AN ORGANIZED HEALTH CARE EDUCATION/TRAINING PROGRAM

## 2023-11-03 PROCEDURE — 97530 THERAPEUTIC ACTIVITIES: CPT | Mod: GP

## 2023-11-03 PROCEDURE — 80053 COMPREHEN METABOLIC PANEL: CPT | Performed by: INTERNAL MEDICINE

## 2023-11-03 RX ORDER — HEPARIN SODIUM 5000 [USP'U]/.5ML
5000 INJECTION, SOLUTION INTRAVENOUS; SUBCUTANEOUS EVERY 8 HOURS
Status: DISCONTINUED | OUTPATIENT
Start: 2023-11-03 | End: 2023-12-01 | Stop reason: HOSPADM

## 2023-11-03 RX ORDER — SALIVA STIMULANT COMB. NO.3
2 SPRAY, NON-AEROSOL (ML) MUCOUS MEMBRANE 4 TIMES DAILY
Status: DISCONTINUED | OUTPATIENT
Start: 2023-11-03 | End: 2023-12-01 | Stop reason: HOSPADM

## 2023-11-03 RX ORDER — ENOXAPARIN SODIUM 100 MG/ML
40 INJECTION SUBCUTANEOUS EVERY 24 HOURS
Status: DISCONTINUED | OUTPATIENT
Start: 2023-11-03 | End: 2023-11-03

## 2023-11-03 RX ADMIN — MICONAZOLE NITRATE: 2 POWDER TOPICAL at 09:08

## 2023-11-03 RX ADMIN — PANTOPRAZOLE SODIUM 40 MG: 40 INJECTION, POWDER, FOR SOLUTION INTRAVENOUS at 09:08

## 2023-11-03 RX ADMIN — ACETYLCYSTEINE 2 ML: 200 INHALANT RESPIRATORY (INHALATION) at 03:30

## 2023-11-03 RX ADMIN — MICONAZOLE NITRATE: 2 POWDER TOPICAL at 21:53

## 2023-11-03 RX ADMIN — NYSTATIN: 100000 CREAM TOPICAL at 09:25

## 2023-11-03 RX ADMIN — ACETYLCYSTEINE 2 ML: 200 INHALANT RESPIRATORY (INHALATION) at 11:36

## 2023-11-03 RX ADMIN — EPOETIN ALFA-EPBX 4000 UNITS: 4000 INJECTION, SOLUTION INTRAVENOUS; SUBCUTANEOUS at 14:14

## 2023-11-03 RX ADMIN — Medication 2 SPRAY: at 21:53

## 2023-11-03 RX ADMIN — NYSTATIN: 100000 CREAM TOPICAL at 21:53

## 2023-11-03 RX ADMIN — SODIUM CHLORIDE 250 ML: 9 INJECTION, SOLUTION INTRAVENOUS at 14:16

## 2023-11-03 RX ADMIN — TRAZODONE HYDROCHLORIDE 25 MG: 50 TABLET ORAL at 21:53

## 2023-11-03 RX ADMIN — DILTIAZEM HYDROCHLORIDE 60 MG: 60 TABLET, FILM COATED ORAL at 21:53

## 2023-11-03 RX ADMIN — Medication: at 14:19

## 2023-11-03 RX ADMIN — IPRATROPIUM BROMIDE AND ALBUTEROL SULFATE 3 ML: .5; 3 SOLUTION RESPIRATORY (INHALATION) at 11:36

## 2023-11-03 RX ADMIN — HEPARIN SODIUM 5000 UNITS: 5000 INJECTION, SOLUTION INTRAVENOUS; SUBCUTANEOUS at 17:18

## 2023-11-03 RX ADMIN — MICONAZOLE NITRATE: 2 POWDER TOPICAL at 09:24

## 2023-11-03 RX ADMIN — CHLORHEXIDINE GLUCONATE 15 ML: 1.2 RINSE ORAL at 09:08

## 2023-11-03 RX ADMIN — ACETYLCYSTEINE 2 ML: 200 INHALANT RESPIRATORY (INHALATION) at 07:26

## 2023-11-03 RX ADMIN — SODIUM CHLORIDE 300 ML: 9 INJECTION, SOLUTION INTRAVENOUS at 14:16

## 2023-11-03 RX ADMIN — HEPARIN SODIUM 1600 UNITS: 1000 INJECTION INTRAVENOUS; SUBCUTANEOUS at 14:17

## 2023-11-03 RX ADMIN — Medication 2 SPRAY: at 18:53

## 2023-11-03 RX ADMIN — IPRATROPIUM BROMIDE AND ALBUTEROL SULFATE 3 ML: .5; 3 SOLUTION RESPIRATORY (INHALATION) at 19:13

## 2023-11-03 RX ADMIN — IPRATROPIUM BROMIDE AND ALBUTEROL SULFATE 3 ML: .5; 3 SOLUTION RESPIRATORY (INHALATION) at 03:30

## 2023-11-03 RX ADMIN — HEPARIN SODIUM 1600 UNITS: 1000 INJECTION INTRAVENOUS; SUBCUTANEOUS at 14:18

## 2023-11-03 RX ADMIN — HYDRALAZINE HYDROCHLORIDE 10 MG: 10 TABLET ORAL at 21:53

## 2023-11-03 RX ADMIN — IPRATROPIUM BROMIDE AND ALBUTEROL SULFATE 3 ML: .5; 3 SOLUTION RESPIRATORY (INHALATION) at 07:26

## 2023-11-03 RX ADMIN — MEROPENEM 500 MG: 500 INJECTION, POWDER, FOR SOLUTION INTRAVENOUS at 17:05

## 2023-11-03 ASSESSMENT — ACTIVITIES OF DAILY LIVING (ADL)
ADLS_ACUITY_SCORE: 44
ADLS_ACUITY_SCORE: 46
CHANGE_IN_FUNCTIONAL_STATUS_SINCE_ONSET_OF_CURRENT_ILLNESS/INJURY: YES
FALL_HISTORY_WITHIN_LAST_SIX_MONTHS: NO
ADLS_ACUITY_SCORE: 46
DOING_ERRANDS_INDEPENDENTLY_DIFFICULTY: NO
ADLS_ACUITY_SCORE: 46
WALKING_OR_CLIMBING_STAIRS_DIFFICULTY: YES
ADLS_ACUITY_SCORE: 44
DRESSING/BATHING_DIFFICULTY: NO
CONCENTRATING,_REMEMBERING_OR_MAKING_DECISIONS_DIFFICULTY: NO
ADLS_ACUITY_SCORE: 46
ADLS_ACUITY_SCORE: 40
ADLS_ACUITY_SCORE: 46
TOILETING_ISSUES: NO
WALKING_OR_CLIMBING_STAIRS: AMBULATION DIFFICULTY, REQUIRES EQUIPMENT
ADLS_ACUITY_SCORE: 44
WEAR_GLASSES_OR_BLIND: YES
EQUIPMENT_CURRENTLY_USED_AT_HOME: WALKER, ROLLING
DIFFICULTY_EATING/SWALLOWING: NO
ADLS_ACUITY_SCORE: 40

## 2023-11-03 NOTE — PLAN OF CARE
"Neuro: Patient is alert to self. Can state date and year of birth. Disoriented to place, situation and time. Confused/repeating sentences. Yelling out \"help\" although selective when stating needs. PERRLA 3mm. Q2 neuros-encourage redirection, reports no pain.  Resp: started on 8L oxymask, increased to 10L oxymask for desatting episodes into the low 80%. Course lungs weak cough, nonproductive cough.   Cardiac: NSR, 80s -edema, occasional PACs, +2 pulses,   GI/: urine occurrence x2, loose Bms x 2, Attempted small NG per order for meds although not successful with placement.  Skin: general bruising/discoloration, staples/ dressing in place (CHANGED) on lower back ELY site CDI.      Plan: NG placement-May need IR?, Decrease O2 requirements, continue cpt if cleared by neuro, need xray with TLSO brace, reorient/get up to chair.      "

## 2023-11-03 NOTE — PROGRESS NOTES
Critical Care  Note      11/03/2023    Name: Mya Hui MRN#: [id number]   Age: 83 year old YOB: 1940     Hsptl Day# 9  ICU DAY #    MV DAY #             Problem List:   Principal Problem:    S/P lumbar spinal fusion  Active Problems:    Acute kidney failure with tubular necrosis (H)    Encephalopathy           Summary/Hospital Course:     Interval/overnight events:  - Improving mental status.  Now following commands.  - Refused CPAP overnight.  Placed on supplemental oxygen.  - Waiting for an NG tube placement for tube feeds.  - Evaluated by speech therapy, recommend n.p.o. for now with continued follow-up.  - Secondary to get HD today.        Assessment and plan :     Mya Hui IS a 83 year old female admitted on 10/25/2023 for hemorrhagic shock post lumbar spinal fusion surgery along with LISA and encephalopathy.      I have personally reviewed the daily labs, imaging studies, cultures and discussed the case with referring physician and consulting physicians.      My assessment and plan by system for this patient is as follows:     #S/p lumbar spinal fusion, with prior radicular symptoms and scoliosis  For lumbar scoliosis and radiculopathy.  #Hemorrhagic shock  Postprocedure.  Received multiple blood products, remains off vasopressors.  #Possible NSTEMI  ST changes noted intraoperatively. Minimal troponin elevation.  Echocardiogram did not show any significant abnormality.  #Acute hypoxic respiratory failure  Remains intubated postoperatively.  Extubated 11/2.  #LISA  Nephrology following.  Likely ATN from previous hemorrhagic shock.  Urine output improving.  On HD.  #Encephalopathy  CT head negative.  Believed to be likely secondary from uremic encephalopathy and sepsis from underlying pneumonia.  Improving.  #Ventilator associated pneumonia  Sputum culture positive for E. coli, stenotrophomonas and Enterobacter cloacae.  Final sensitivities pending for  "stenotrophomonas.  #HTN  #DM  #HL  #Obesity  #History of hypothyroid and normal TSH  #History of CKD  #History of lower extremity edema  #GERD  #ANAYELI  Noncompliant with CPAP.     Plan:  - Will need to maintain aggressive pulmonary hygiene measures postextubation to clear out her secretions.  Cough improving.  - Transfuse to keep hemoglobin above 8 with concerns of cardiac ischemia intraoperatively.  - Continue to wean supplemental oxygen to keep sats above 90%.  - Noncompliant with CPAP.  -Continue meropenem, pending final sensitivities from the sputum culture stenotrophomonas.  -Continue hemodialysis.  Appreciate nephrology recommendations.  BUN/creatinine improving.  - Minimize further sedative/psychotropic meds.  - Appreciate speech therapy recommendations.  - PT/OT.  - Neurosurgery following.  On adequate pain control.  - Place feeding tube under IR guidance and restart tube feedings until she starts oral diet.      IV/Access:   1. Venous access - midline, right IJ HD catheter  2. Arterial access -radial A-line     Plan  -Radial A-line can be removed.     ICU Prophylaxis:   1. DVT: Initiate DVT prophylaxis with enoxaparin  2. VAP: Not intubated  3. Stress Ulcer: PPI  4. Restraints: N/A  5. Wound care  -routine wound care post surgery  6. Feeding -feeding tube to be placed today  7. Family Update: Updated the patient at the bedside.  8. Disposition -transfer out of the ICU.    Clinically Significant Risk Factors             # Anion Gap Metabolic Acidosis: Highest Anion Gap = 22 mmol/L in last 2 days, will monitor and treat as appropriate  # Hypoalbuminemia: Lowest albumin = 2.5 g/dL at 11/1/2023  4:19 AM, will monitor as appropriate     # Hypertension: Noted on problem list        # Obesity: Estimated body mass index is 37.64 kg/m  as calculated from the following:    Height as of this encounter: 1.651 m (5' 5\").    Weight as of this encounter: 102.6 kg (226 lb 3.1 oz).   # Moderate Malnutrition: based on " nutrition assessment    # Financial/Environmental Concerns: none                             Key Medications:      - MEDICATION INSTRUCTIONS for Dialysis Patients -   Does not apply See Admin Instructions    B and C vitamin Complex with folic acid  5 mL Per Feeding Tube Daily    diltiazem  60 mg Oral or Feeding Tube Q6H NATACHA    enoxaparin ANTICOAGULANT  40 mg Subcutaneous Q24H    epoetin stephie-epbx  4,000 Units Intravenous Once in dialysis/CRRT    sodium chloride (PF) 0.9%  10 mL Intracatheter Once in dialysis/CRRT    Followed by    heparin  1.3-2.6 mL Intracatheter Once in dialysis/CRRT    sodium chloride (PF) 0.9%  10 mL Intracatheter Once in dialysis/CRRT    Followed by    heparin  1.3-2.6 mL Intracatheter Once in dialysis/CRRT    hydrALAZINE  10 mg Oral or Feeding Tube Q8H    insulin aspart  1-12 Units Subcutaneous Q4H    ipratropium - albuterol 0.5 mg/2.5 mg/3 mL  3 mL Nebulization Q4H    levothyroxine  100 mcg Oral or NG Tube QAM AC    meropenem  500 mg Intravenous Q24H    - MEDICATION INSTRUCTIONS -   Does not apply Once    nystatin   Topical BID    pantoprazole  40 mg Per Feeding Tube QAM AC    Or    pantoprazole  40 mg Intravenous QAM AC    polyethylene glycol  17 g Oral or NG Tube Daily    senna-docusate  1 tablet Oral or NG Tube BID    sodium chloride (PF)  10 mL Intracatheter Q8H    sodium chloride (PF)  3 mL Intracatheter Q8H    sodium chloride 0.9%  250 mL Intravenous Once in dialysis/CRRT    sodium chloride 0.9%  300 mL Hemodialysis Machine Once    traZODone  25 mg Oral At Bedtime      dextrose      sodium chloride 10 mL/hr at 11/01/23 1800              Physical Examination:   Temp:  [97.7  F (36.5  C)-98.7  F (37.1  C)] 98.7  F (37.1  C)  Pulse:  [75-90] 83  Resp:  [10-28] 18  BP: (132-162)/() 160/85  FiO2 (%):  [50 %] 50 %  SpO2:  [91 %-99 %] 98 %    Intake/Output Summary (Last 24 hours) at 11/3/2023 1306  Last data filed at 11/3/2023 0800  Gross per 24 hour   Intake 180 ml   Output 70 ml   Net  110 ml     Wt Readings from Last 4 Encounters:   11/03/23 102.6 kg (226 lb 3.1 oz)   10/19/23 95 kg (209 lb 8 oz)   09/28/23 97.1 kg (214 lb)   09/14/23 97.1 kg (214 lb)     BP - Mean:  [] 113  Vent Mode: CPAP/PS  (Continuous positive airway pressure with Pressure Support)  FiO2 (%): 50 %  Resp Rate (Set): 25 breaths/min  Tidal Volume (Set, mL): 400 mL  PEEP (cm H2O): 5 cmH2O  Pressure Support (cm H2O): 5 cmH2O  Resp: 18    Recent Labs   Lab 10/31/23  0044 10/30/23  1339 10/28/23  0425 10/27/23  2042   PH 7.42 7.51* 7.34* 7.28*   PCO2 33* 27* 32* 36   PO2 92 68* 100 110*   HCO3 21 21 17* 17*   O2PER 50 30 40 45       GEN: no acute distress   HEENT: head ncat, sclera anicteric, OP patent, trachea midline   PULM: unlabored synchronous with vent, clear anteriorly    CV/COR: RRR S1S2 no gallop,  No rub, no murmur  ABD: soft nontender, hypoactive bowel sounds, no mass  EXT:  warm and well perfused x4  NEURO: PERRL, no obvious deficits  SKIN: no obvious rash  LINES: clean, dry intact         Data:   All data and imaging reviewed     ROUTINE ICU LABS (Last four results)  CMP  Recent Labs   Lab 11/03/23  0847 11/03/23  0502 11/03/23  0458 11/03/23  0020 11/02/23  0800 11/02/23  0521 11/01/23  0754 11/01/23  0419 10/31/23  0412 10/31/23  0409   NA  --   --  140  --   --  139  --  139  --  140   POTASSIUM  --   --  4.1  --   --  4.1  --  4.8  --  4.4   CHLORIDE  --   --  96*  --   --  98  --  100  --  102   CO2  --   --  22  --   --  22  --  18*  --  20*   ANIONGAP  --   --  22*  --   --  19*  --  21*  --  18*   GLC 84 93 95 92   < > 108*  115*   < > 116*   < > 177*   BUN  --   --  90.1*  --   --  75.6*  --  106.7*  --  82.6*   CR  --   --  6.37*  --   --  4.88*  --  6.72*  --  5.75*   GFRESTIMATED  --   --  6*  --   --  8*  --  6*  --  7*   TERESA  --   --  7.9*  --   --  7.8*  --  7.4*  --  7.2*   MAG  --   --  2.2  --   --  2.1  --  2.3  --  2.1   PHOS  --   --  8.6*  --   --  5.8*  --  7.0*  --  5.8*   PROTTOTAL  --    "--  5.1*  --   --  5.0*  --  4.5*  --  4.2*   ALBUMIN  --   --  2.7*  --   --  2.9*  --  2.5*  --  2.6*   BILITOTAL  --   --  0.5  --   --  0.6  --  0.4  --  0.3   ALKPHOS  --   --  141*  --   --  121*  --  83  --  89   AST  --   --  140*  --   --  101*  --  44  --  73*   ALT  --   --  38  --   --  36  --  36  --  47    < > = values in this interval not displayed.     CBC  Recent Labs   Lab 11/03/23  0458 11/02/23  0521 11/01/23  0419 10/31/23  2100 10/31/23  0409   WBC 18.5* 16.7* 10.9  --  5.1   RBC 2.82* 2.83* 2.77*  --  2.45*   HGB 8.1* 8.1* 8.1* 8.0* 7.1*   HCT 25.4* 24.5* 24.4*  --  21.6*   MCV 90 87 88  --  88   MCH 28.7 28.6 29.2  --  29.0   MCHC 31.9 33.1 33.2  --  32.9   RDW 17.0* 17.0* 17.2*  --  17.5*    156 117*  --  96*     INRNo lab results found in last 7 days.  Arterial Blood Gas  Recent Labs   Lab 10/31/23  0044 10/30/23  1339 10/28/23  0425 10/27/23  2042   PH 7.42 7.51* 7.34* 7.28*   PCO2 33* 27* 32* 36   PO2 92 68* 100 110*   HCO3 21 21 17* 17*   O2PER 50 30 40 45       All cultures:  No results for input(s): \"CULT\" in the last 168 hours.  Recent Results (from the past 24 hour(s))   XR Abdomen Port 1 View    Narrative    EXAM: XR ABDOMEN PORT 1 VIEW  LOCATION: [facility]  DATE: 11/2/2023    INDICATION: For feeding tube placement.  COMPARISON: None.      Impression    IMPRESSION: NG tube tip in the distal esophagus near the EG junction. Postoperative changes projecting over the midline of the abdomen with extensive postoperative changes of posterior amber fixation thoracolumbar spine. Bowel gas pattern is nonspecific.   Nothing for obstruction or free air. No evidence for renal stones.       I personally spent 50 minutes on the date of the encounter doing chart review, history and exam, documentation and further activities per the note.                            LITO Werner   of Medicine  [facility]  Pulmonary, Allergy, " Critical Care and Sleep Medicine  Pager - [phone]

## 2023-11-03 NOTE — CONSULTS
Care Management Follow Up    Length of Stay (days): 9    Expected Discharge Date: 11/07/2023     Concerns to be Addressed: discharge planning     Patient plan of care discussed at interdisciplinary rounds: yes  Yes    Anticipated Discharge Disposition: Home, Home Care, Dialysis Services, Skilled Nursing Facility, Transitional Care     Anticipated Discharge Services: Transportation Services  Anticipated Discharge DME: Other (see comment) (to be determined)    Patient/family educated on Medicare website which has current facility and service quality ratings:    Education Provided on the Discharge Plan: Yes  Patient/Family in Agreement with the Plan: unable to assess (discussed mutiple discharge choices)    Referrals Placed by CM/SW: External Care Coordination, Specialty Providers  Private pay costs discussed: Not applicable    Additional Information:  Writer called patient's Daughter April by phone.  April plans on coming to visit her mother today.  Writer updated April regarding discharge planning due to updated PT/OT/SLP notes and plan for continued dialysis.  April is aware that [facility] is currently following, however, due to the ongoing oxygen, dialysis and other issues they are sending it to [facility] for review.  Writer gave April update per SLP note and Nephrology note and outlined LTACH facilities.     Princess Barcenas RN, BSN, ACM   Care Transitions Specialist  [facility]  Care Transitions Specialist  [id number] [id number]  [email or web address]  Office: [phone] Fax: [phone]  [facility]

## 2023-11-03 NOTE — PLAN OF CARE
"  Problem: Adult Inpatient Plan of Care  Goal: Plan of Care Review  Description: The Plan of Care Review/Shift note should be completed every shift.  The Outcome Evaluation is a brief statement about your assessment that the patient is improving, declining, or no change.  This information will be displayed automatically on your shift  note.  Flowsheets (Taken 11/3/2023 1835)  Outcome Evaluation: Feeding tube placement by XRay tomorrow.  Plan of Care Reviewed With: (daughter april)   patient   child  Overall Patient Progress: improving  Goal: Patient-Specific Goal (Individualized)  Description: You can add care plan individualizations to a care plan. Examples of Individualization might be:  \"Parent requests to be called daily at 9am for status\", \"I have a hard time hearing out of my right ear\", or \"Do not touch me to wake me up as it startles  me\".  Outcome: Progressing  Goal: Absence of Hospital-Acquired Illness or Injury  Outcome: Progressing  Intervention: Identify and Manage Fall Risk  Recent Flowsheet Documentation  Taken 11/3/2023 1600 by Song Tenorio RN  Safety Promotion/Fall Prevention:   activity supervised   clutter free environment maintained   increased rounding and observation   increase visualization of patient   safety round/check completed  Taken 11/3/2023 1200 by Song Tenorio, RN  Safety Promotion/Fall Prevention:   activity supervised   clutter free environment maintained   increased rounding and observation   increase visualization of patient   safety round/check completed  Taken 11/3/2023 0800 by Song Tenorio, RN  Safety Promotion/Fall Prevention:   activity supervised   clutter free environment maintained   increased rounding and observation   increase visualization of patient   safety round/check completed  Intervention: Prevent Skin Injury  Recent Flowsheet Documentation  Taken 11/3/2023 1600 by Song Tenorio, RN  Skin Protection: skin to device areas padded  Device Skin Pressure " Protection:   positioning supports utilized   pressure points protected   skin-to-device areas padded  Taken 11/3/2023 1200 by Song Tenorio RN  Body Position:   turned   right  Skin Protection: skin to device areas padded  Device Skin Pressure Protection:   positioning supports utilized   pressure points protected   skin-to-device areas padded  Taken 11/3/2023 1000 by Song Tenorio RN  Body Position:   turned   left  Taken 11/3/2023 0800 by Song Tenorio RN  Body Position:   turned   right  Skin Protection: skin to device areas padded  Device Skin Pressure Protection:   positioning supports utilized   pressure points protected   skin-to-device areas padded  Intervention: Prevent Infection  Recent Flowsheet Documentation  Taken 11/3/2023 1600 by Song Tenorio RN  Infection Prevention:   single patient room provided   personal protective equipment utilized   hand hygiene promoted   equipment surfaces disinfected  Taken 11/3/2023 1200 by Song Tenorio RN  Infection Prevention:   single patient room provided   personal protective equipment utilized   hand hygiene promoted   equipment surfaces disinfected  Taken 11/3/2023 0800 by Song Tenorio RN  Infection Prevention:   single patient room provided   personal protective equipment utilized   hand hygiene promoted   equipment surfaces disinfected  Goal: Optimal Comfort and Wellbeing  Outcome: Progressing  Intervention: Provide Person-Centered Care  Recent Flowsheet Documentation  Taken 11/3/2023 1600 by Song Tenorio RN  Trust Relationship/Rapport:   care explained   choices provided   emotional support provided   reassurance provided  Taken 11/3/2023 1200 by Song Tenorio RN  Trust Relationship/Rapport:   care explained   choices provided   emotional support provided   reassurance provided  Taken 11/3/2023 1000 by Song Tenorio RN  Trust Relationship/Rapport:   questions answered   emotional support provided  Taken 11/3/2023 0800 by Jona  Song MILLS RN  Trust Relationship/Rapport:   care explained   choices provided   emotional support provided   reassurance provided  Goal: Readiness for Transition of Care  Outcome: Not Progressing   Goal Outcome Evaluation:      Plan of Care Reviewed With: patient, child (daughter april)    Overall Patient Progress: improvingOverall Patient Progress: improving    Outcome Evaluation: Feeding tube placement by XRay tomorrow.

## 2023-11-03 NOTE — PROGRESS NOTES
Chart reviewed, patient was seen for a complete reassessment yesterday - see note dated 11/2/23 for details    Patient is NPO per SLP  Plan for FT in IR today and then resume TF     K/Mg normal  Phos 8.4 (H) - SKI  BGM   I/O 430/105, wt 102.6 kg (down from admit)    Drains = 105 mL  BM x 5 yesterday (was previously constipated, got a lot of bowel meds and now having loose stools)    ASSESSED NUTRITION NEEDS:  Dosing Weight:   106.4 kg ABW for energy and 56.8 kg IBW for protein    Estimated Energy Needs: 0577-0508 kcals (14-17 Kcal/Kg)  Justification: obese   Estimated Protein Needs: + grams protein (1.5 - 2+ g pro/Kg)  Justification: obesity guidelines + HD     Will plan to resume TF at 20 mL/hr x 12 hours and then advance to goal of Novasource Renal at 40 mL/hr x 22 hours per day (hold TF 1 hour before and 1 hour after Synthroid administration) = 1760 kcal (17 kcal/kg), 80 g protein (1.4 g/kg), 161 g CHO, 0 g fiber, 631 mL H2O   Add ProSource TF20 once daily = 80 kcal and 20 g protein   Total = 1840 kcal (17 kcal/kg), 100 g protein (1.8 g/kg)   Nephronex daily   Flushes 30 mL every 4 hours     Orders updated in EPIC    Sonia Sanchez, RD, LD, CNSC   Clinical Dietitian - [facility]

## 2023-11-03 NOTE — PROGRESS NOTES
Potassium   Date Value Ref Range Status   11/03/2023 4.1 3.4 - 5.3 mmol/L Final   05/13/2022 4.5 3.4 - 5.3 mmol/L Final   01/06/2021 4.3 3.4 - 5.3 mmol/L Final     Potassium POCT   Date Value Ref Range Status   10/25/2023 4.7 3.4 - 5.3 mmol/L Final     Comment:     000     Hemoglobin   Date Value Ref Range Status   11/03/2023 8.1 (L) 11.7 - 15.7 g/dL Final   09/23/2020 13.1 11.7 - 15.7 g/dL Final     Creatinine   Date Value Ref Range Status   11/03/2023 6.37 (H) 0.51 - 0.95 mg/dL Final   01/06/2021 1.04 0.52 - 1.04 mg/dL Final     Urea Nitrogen   Date Value Ref Range Status   11/03/2023 90.1 (H) 8.0 - 23.0 mg/dL Final   05/13/2022 17 7 - 30 mg/dL Final   01/06/2021 21 7 - 30 mg/dL Final     Sodium   Date Value Ref Range Status   11/03/2023 140 135 - 145 mmol/L Final     Comment:     Reference intervals for this test were updated on 09/26/2023 to more accurately reflect our healthy population. There may be differences in the flagging of prior results with similar values performed with this method. Interpretation of those prior results can be made in the context of the updated reference intervals.    01/06/2021 144 133 - 144 mmol/L Final     INR   Date Value Ref Range Status   10/27/2023 1.57 (H) 0.85 - 1.15 Final       DIALYSIS PROCEDURE NOTE  Hepatitis status of previous patient on machine log was checked and verified ok to use with this patients hepatitis status.  Patient dialyzed for 3 hrs. on a K3 bath with a net fluid removal of  3.5L.  A BFR of 400 ml/min was obtained via a CVC.    The treatment plan was discussed with Dr. Chacon during the treatment.    Total heparin received during the treatment: 0 units.     Line flushed, clamped and capped with heparin 1:1000 1.6 mL (1600 units) per lumen    Meds  given: epo, see MAR   Complications: none      Person educated: patient. Knowledge base minimal. Barriers to learning: none. Educated on procedure via verbal mode. Patient verbalized understanding.      ICEBOAT? Timeout performed pre-treatment  I: Patient was identified using 2 identifiers  C:  Consent Signed Yes  E: Equipment preventative maintenance is current and dialysis delivery system OK to use  B: Hepatitis B    Latest Reference Range & Units 10/30/23 12:50   Hep B Surface Agn Nonreactive  Nonreactive   Hepatitis B Surface Antibody Instrument Value <8.00 m[IU]/mL 15.68   Hepatitis B Surface Antibody  Reactive     O: Dialysis orders present and complete prior to treatment  A: Vascular access verified and assessed prior to treatment  T: Treatment was performed at a clinically appropriate time  ?: Patient was allowed to ask questions and address concerns prior to treatment  See Adult Hemodialysis flowsheet in [employer] for further details and post assessment.  Machine water alarm in place and functioning. Transducer pods intact and checked every 15min.   Pt treatment bedside in ICU.  Chlorine/Chloramine water system checked every 4 hours.  Outpatient Dialysis not established    Patient repositioned every 2 hours during the treatment.  Post treatment report given to Hernán Huffman RN

## 2023-11-03 NOTE — PROGRESS NOTES
Renal Medicine Progress Note            Assessment/Plan:     Assessment: Mya Hui is a 83-year-old woman with PMH DM2, hypothyroidism, HLD, HTN admitted 10/25/2023 due to hemorrhagic shock after lumbar spinal fusion.  Course complicated by LISA and encephalopathy.    LISA-D  CKD IIIA  Anasarca  Hypoalbuminemia  Baseline creatinine 1.1-1.4, EGFR 45-50.  No history of appreciable proteinuria.  Worsening creatinine in setting of hemorrhagic shock, most currently has ATN as a result.  Has developed fluid overload, Lasix drip started with good output, however renal function continues to have anephric rising creatinine daily despite this.  She has a rising BUN and worsening acidosis, reasonable to start dialysis to improve mentation and improve fluid status.  -Tunneled dialysis catheter placed by IR on 10/30  - HD session 11/3  -Daily BMP  -Renally dose meds  -Continue MWF schedule for now, will hold off on dialysis over the weekend to assess for renal recovery    Anion gap metabolic acidosis, improved  Largely due to renal failure.     Hemorrhagic shock, resolved  S/p lumbar fusion  Shock liver, improved  Underwent lumbar fusion on 10/25, had 2 L blood loss during surgery.  Developed hemorrhagic shock thereafter.  Multiple transfusions and pressors, currently not requiring pressors.  ELY drains still in place.    Enterobacter PNA   Sputum cx with enterobacter. Zosyn switched to meropenem 11/1.     Plan/Recs:  1) HD today  2) no HD planned over the weekend, will assess for renal recovery.  Please page if acute issues arise over weekend    Reviewed notes from NSMELISSA, intensivist, RNs.    Chance Chacon MD   [facility]  Office: [phone]          Interval History:     Extubated yesterday, on 8 L nasal cannula.  Tried on CPAP initially, however patient refused.    She denies shortness of breath today  More awake and alert today  No other complaints  Planning for HD today    Had some voids that were  "unmeasured, not appreciable amount       Medications and Allergies:      - MEDICATION INSTRUCTIONS for Dialysis Patients -   Does not apply See Admin Instructions    B and C vitamin Complex with folic acid  5 mL Per Feeding Tube Daily    diltiazem  60 mg Oral or Feeding Tube Q6H NATACHA    enoxaparin ANTICOAGULANT  40 mg Subcutaneous Q24H    epoetin stephie-epbx  4,000 Units Intravenous Once in dialysis/CRRT    sodium chloride (PF) 0.9%  10 mL Intracatheter Once in dialysis/CRRT    Followed by    heparin  1.3-2.6 mL Intracatheter Once in dialysis/CRRT    sodium chloride (PF) 0.9%  10 mL Intracatheter Once in dialysis/CRRT    Followed by    heparin  1.3-2.6 mL Intracatheter Once in dialysis/CRRT    hydrALAZINE  10 mg Oral or Feeding Tube Q8H    insulin aspart  1-12 Units Subcutaneous Q4H    ipratropium - albuterol 0.5 mg/2.5 mg/3 mL  3 mL Nebulization Q4H    levothyroxine  100 mcg Oral or NG Tube QAM AC    meropenem  500 mg Intravenous Q24H    - MEDICATION INSTRUCTIONS -   Does not apply Once    nystatin   Topical BID    pantoprazole  40 mg Per Feeding Tube QAM AC    Or    pantoprazole  40 mg Intravenous QAM AC    polyethylene glycol  17 g Oral or NG Tube Daily    senna-docusate  1 tablet Oral or NG Tube BID    sodium chloride (PF)  10 mL Intracatheter Q8H    sodium chloride (PF)  3 mL Intracatheter Q8H    sodium chloride 0.9%  250 mL Intravenous Once in dialysis/CRRT    sodium chloride 0.9%  300 mL Hemodialysis Machine Once    traZODone  25 mg Oral At Bedtime        Allergies   Allergen Reactions    Fentanyl Nausea and Vomiting     VERY sensitive to most narcotics if not all.    Morphine And Related Nausea            Physical Exam:   Vitals were reviewed  BP (!) 160/85   Pulse 83   Temp 98.7  F (37.1  C) (Axillary)   Resp 18   Ht 1.651 m (5' 5\")   Wt 102.6 kg (226 lb 3.1 oz)   LMP  (LMP Unknown)   SpO2 98%   BMI 37.64 kg/m      Wt Readings from Last 3 Encounters:   11/03/23 102.6 kg (226 lb 3.1 oz)   10/19/23 95 kg " (209 lb 8 oz)   09/28/23 97.1 kg (214 lb)       Intake/Output Summary (Last 24 hours) at 10/30/2023 0922  Last data filed at 10/30/2023 0800  Gross per 24 hour   Intake 1717.43 ml   Output 1370 ml   Net 347.43 ml       GENERAL APPEARANCE: NAD  HEENT: Dry mucous membranes  RESP: Right-sided lung coarse rhonchi  CV: RRR, no murmur  ABDOMEN: soft, nontender  EXTREMITIES/SKIN: 2+ pitting edema in upper extremities, 1-2+ in lower extremities.  NEURO: Drowsy but awakens to voice, answers questions appropriately           Data:     BMP  Recent Labs   Lab 11/03/23  1303 11/03/23  0847 11/03/23  0502 11/03/23  0458 11/02/23  0800 11/02/23  0521 11/01/23  0754 11/01/23  0419 10/31/23  0412 10/31/23  0409   NA  --   --   --  140  --  139  --  139  --  140   POTASSIUM  --   --   --  4.1  --  4.1  --  4.8  --  4.4   CHLORIDE  --   --   --  96*  --  98  --  100  --  102   TERESA  --   --   --  7.9*  --  7.8*  --  7.4*  --  7.2*   CO2  --   --   --  22  --  22  --  18*  --  20*   BUN  --   --   --  90.1*  --  75.6*  --  106.7*  --  82.6*   CR  --   --   --  6.37*  --  4.88*  --  6.72*  --  5.75*   GLC 79 84 93 95   < > 108*  115*   < > 116*   < > 177*    < > = values in this interval not displayed.     CBC  Recent Labs   Lab 11/03/23  0458 11/02/23  0521 11/01/23  0419 10/31/23  2100 10/31/23  0409   WBC 18.5* 16.7* 10.9  --  5.1   HGB 8.1* 8.1* 8.1* 8.0* 7.1*   HCT 25.4* 24.5* 24.4*  --  21.6*   MCV 90 87 88  --  88    156 117*  --  96*     Lab Results   Component Value Date     (H) 11/03/2023    ALT 38 11/03/2023    ALKPHOS 141 (H) 11/03/2023    BILITOTAL 0.5 11/03/2023    CHARLETTE 42 10/31/2023     Lab Results   Component Value Date    INR 1.57 (H) 10/27/2023     Color Urine (no units)   Date Value   10/26/2023 Yellow   03/08/2019 Yellow     Appearance Urine (no units)   Date Value   10/26/2023 Slightly Cloudy (A)   03/08/2019 Cloudy     Glucose Urine (mg/dL)   Date Value   10/26/2023 500 (A)   03/08/2019 Negative      Bilirubin Urine (no units)   Date Value   10/26/2023 Negative   03/08/2019 Negative     Ketones Urine (mg/dL)   Date Value   10/26/2023 Trace (A)   03/08/2019 Negative     Specific Gravity Urine (no units)   Date Value   10/26/2023 1.024   03/08/2019 1.020     pH Urine   Date Value   10/26/2023 5.5   03/08/2019 5.0 pH     Protein Albumin Urine (mg/dL)   Date Value   10/26/2023 300 (A)   03/08/2019 100 (A)     Urobilinogen Urine   Date Value   01/17/2022 0.2 E.U./dL   08/10/2017 0.2 EU/dL     Nitrite Urine (no units)   Date Value   10/26/2023 Negative   03/08/2019 Negative     Leukocyte Esterase Urine (no units)   Date Value   10/26/2023 Negative   03/08/2019 Moderate (A)         Attestation:  I have reviewed today's vital signs, notes, medications, labs and imaging.    Chance Chacon MD  [facility] - [facility]phrology  Office: [phone]

## 2023-11-03 NOTE — PROGRESS NOTES
CLINICAL SWALLOW EVALUATION        11/03/23 1038   Appointment Info   Signing Clinician's Name / Credentials (SLP) Maida Breaux M.A., CCC-SLP, BCS-S   General Information   Onset of Illness/Injury or Date of Surgery 10/25/23   Referring Physician Dr. Waite   Patient/Family Therapy Goal Statement (SLP) Patient wants water.   Pertinent History of Current Problem Patient admitted for lumbar fusion surgery and led to hemorrhagic shock, LISA, encephalopathy.  Patient was extubated yesterday 1025, now on 4 L oxymask.   Type of Evaluation   Type of Evaluation Swallow Evaluation   Oral Motor   Oral Musculature generally intact   Structural Abnormalities none present   Mucosal Quality good   Oral Motor Deficits Observed   (Generalized weakness)   Dentition (Oral Motor)   Dentition (Oral Motor) natural dentition   Lip Function (Oral Motor)   Lip Range of Motion (Oral Motor) WNL   General Swallowing Observations   Past History of Dysphagia Patient denies hx of dysphagia issues pre-op.   Respiratory Support nasal cannula   Current Diet/Method of Nutritional Intake (General Swallowing Observations, NIS) NPO   Swallowing Evaluation Clinical swallow evaluation   Clinical Swallow Eval: Thin Liquid Texture Trial   Mode of Presentation, Thin Liquids spoon;fed by clinician   Volume of Liquid or Food Presented 2 oz total   Oral Phase of Swallow premature pharyngeal entry;delayed AP movement;residue in oral cavity  (Holding)   Oral Residue left anterior lateral sulci;right anterior lateral sulci   Pharyngeal Phase of Swallow impaired;coughing/choking   Diagnostic Statement Delayed cough response, incomplete swallow response requiring max cueing to swallow   Swallowing Recommendations   Diet Consistency Recommendations NPO;ice chips only   Mode of Delivery Recommendations bolus size, small   Medication Administration Recommendations, Swallowing (SLP) NPO   Clinical Impression   Criteria for Skilled Therapeutic Interventions Met (SLP  Eval) Yes, treatment indicated   SLP Diagnosis Moderate-severe oropharyngeal dysphagia   Risks & Benefits of therapy have been explained evaluation/treatment results reviewed;patient   SLP Total Evaluation Time   Eval: oral/pharyngeal swallow function, clinical swallow Minutes (91404) 20   SLP Goals   Therapy Frequency (SLP Eval) daily   SLP Predicted Duration/Target Date for Goal Attainment 11/10/23   SLP Goals Swallow   SLP: Safely tolerate diet without signs/symptoms of aspiration One P.O. texture;With use of swallow precautions;With use of compensatory swallow strategies;With assistance/supervision   SLP Discharge Planning   SLP Plan PO readiness if improved alertness   SLP Discharge Recommendation Acute [facility]hab [facility]-Motivated patient will benefit from intensive, interdisciplinary therapy.  Anticipate will be able to tolerate 3 hours of therapy per day   SLP Rationale for DC Rec Significantly below baseline, likely dysphagia and communication needs at baseline   SLP Brief overview of current status  Clinical swallow evaluation completed - limited due to fatigue and inability to maintain alertness.  Patient with bolus holding with ice chip and water trials despite max cues not able to maintain alertness.  Recommend continue NPO and recommend frequent oral cares with moisture for comfort, single ice chips if alert and demonstrating consistent swallow response without coughing.   Total Session Time   Total Session Time (sum of timed and untimed services) 20

## 2023-11-03 NOTE — CONSULTS
[facility]  Consult Note - Hospitalist Service     Date of Admission:  10/25/2023  Consult Requested by: Intensivist  Reason for Consult: Transfer of co-medical management   PRIMARY CARE PROVIDER:    Kisha Stinson    Assessment & Plan   Mya Hui is a 83 year old female admitted on 10/25/2023.    Past medical history significant for Scoliosis, Lumbar radiculopathy, HTN, HLP, DM2, Hypothyroidism, CKD stage 3, Morbid obesity, GERD, Sliding hiatal hernia, Overactive bladder, OA, Chronic right shoulder pain/rotator cuff arthropathy, Migraines, Asymptomatic carotid artery stenosis who underwent an elective T11 to pelvis robotic decompression and fusion.      Discussed with Dr. Castro and reviewed multiple notes since admission.      Surgery was performed on 10/25/23 under general anesthesia.  Surgery was complicated with development of arrhythmia with significant hypotension and noted 3.5 L blood loss per Op Note and Anesthesia postprocedure evaluation note.  Patient received IV fluids, 6 units of pRBCs and 2 units of FFP intra-op.  ST changes were noted intraoperatively.  Patient remained intubated and started on Levophed and was transferred to the ICU due to hemorrhagic shock.      Patient has remained in the ICU since 10/25/2023.  Patient remained intubated until the morning of 11/2 and has been on supplemental O2 per Oxymask currently at 4 L/m.  She required vasopressor support with Levophed until 10/27.  Patient received additional 2 units FFP, 10 units of cryo and 1 unit of PLT.      Troponin were trended and minimally elevated.  ECHO completed and without any abnormalities (LV normal size and function with EF of 65-70%.  RV normal size, function and structure.  No valve disease).    Patient had been developed fluid overload and was treated with lasix drip with good output but developed anephric rising creatinine, BUN and developed worsening acidosis.  Due to  development of LISA-D Nephrology was consulted and tunnel dialysis catheter placed 10/30 and has been undergoing HD Mon, Wed, Fri (last session was today, 11/3/2023).      Patient has also developed aspiration/ventilator associated pneumonia with sputum Cx growing Enterobacter, stenotrophomonas and E. coli and was initially started on Zoysn and switched to Meropenem on 11/1/2023.  Currently continued on Meropenem and awaiting final cultures.      Patient was noted to be encephalopathic for which Vascular Neurology was consulted.  Brain MRI was completed and noted a left basal ganglia small focus of restricted diffusion but not a cause for encephalopathy and felt it was likely multifactorial.  EEG monitor was completed and negative and subsequently stopped.  Vascular Neurology recommended repeat brain MRI in 2-3 months.  Suspect due to uremic encephalopathy and sepsis.  Patient currently NPO and assessed by SLP.  Plan to have NG placed by Radiology and start TF.      Post-op hemorrhagic shock (Resolved)  Shocked liver (Improving)  *Surgery was complicated with development of arrhythmia with significant hypotension and noted 3.5 L blood loss per Op Note and Anesthesia postprocedure evaluation note.  Patient received IV fluids, 6 units of pRBCs and 2 units of FFP intra-op.    *Patient received additional 2 units FFP, 10 units of cryo and 1 unit of PLT while in the ICU.    - CMP ordered for tomorrow.      Encephalopathy, multifactorial  Delirium prevention:   --Reorient patient at each interaction.  --Give patient window bed if possible.  --Keep room lights on and blinds open during day (8am-8pm), low lights 8pm-8am.  --Minimize interruptions of sleep at night (consolidate vitals, nursing assessments, medications).  - Avoid sedating medications as able.    - Neuro checks every 4 hours.      Leukocytosis  WBC elevated the past 2 days (11/2 and 11/3).  Van/Zosyn started 10/31 and stopped on 11/1 when transitioned to  meropenem.    - CBC ordered for the morning.    - Check UA and repeat procal (previously elevated at 3.01 on 10/31).  If procal higher than previously level will likely request ID consult.    - Monitor closely.      LISA-D  ATN  CKD stage 3a  Hypoalbuminemia  Anasarca  *Tunnel dialysis cath placed on 10/30 by IR, currently undergoing HD on Mon, Wed, Fri.    *Baseline creatinine between 1.1-1.4 with GFR in the 40-50's.    - Nephrology consultation appreciated/continued.     --Reviewed note from 11/3.  - Daily CMP monitoring.    - Renally dose medications.    - Avoid nephrotic medications as able.    - Hold PTA lisinopril 40 mg/d.      Anion gap metabolic acidosis  - Nephrology consultation appreciated/continued.      Acute hypoxic respiratory failure (Improving)  *Extubated 11/2.  Currently requiring supplemental O2 per Oxymask at 4 L/m.    - Continue with antibiotics as below.    - Supplemental O2 available/continued; wean as able.    - RCAT consult requested.    - Encourage use of IS/Flutter valve.      Aspiration/ventilator associated PNA (Enterobacter, stenotrophomonas and E. Coli)  - Continue IV Meropenem.  - Follow up on sputum cultures and adjust antibiotics as needed.    - DuoNeb every 4 hours.      Dysphagia  Moderate malnutrition  *Failed SLP assessment.    - Plan for NG tube placement with XRAY (unable to be completed at the bedside due to hiatal hernia).    - Initially medications transition to IV route if able and now transitioned to feeding tube route.      Troponin elevation  *Demand ischemia due to hemorrhagic shock.  ECHO completed and without abnormalities.      Incidental finding of left basal ganglia restricted diffusion  *Noted on brain MRI.    - Repeat MRI as an outpatient in 2-3 months (ordered by Vascular Neurology).    Scoliosis  Lumbar radiculopathy  S/p T11 to pelvis robotic decompression and fusion  POD# 9.   - Neurosurgery is managing.   --Defer analgesic/pain management, DVT prophylaxis,  PT/OT.    --Reviewed NS note from today (11/3):   --Start DVT Prophylaxis with subcutaneous Heparin.     --OK to start chest PT.     --Neuro checks every 4 hours.     --Activity placed for bracing:TLSO when out of bed, OK to be OFF with sitting, resting and hygiene.   --Obtain upright Xray in TLSO.   --Left drain continued and will monitor output.     --PT/OT continued.    --PT/OT recommending ARU at discharge.      HTN  *Currently PTA medications have not been resumed (diltiazem  mg BID and lisinopril 40 mg/).  - Diltiazem 60 mg every 6 hours per FT and hydralazine 10 mg every 8 hours per FT ordered and will be started tomorrow 11/4.    - Monitor closely.      HLP  - Hold PTA pravastatin 40 mg/d.      Type 2 DM, controlled  Stress induced hyperglycemia  Recent Labs   Lab Test 10/25/23  1629   A1C 6.3*   *Does not appear to be on medications PTA.    - High dose insulin sliding scale ordered.  - Glucose checks every 4 hours.  - Hypoglycemic protocol in place.      Hypothyroidism  TSH   Date Value Ref Range Status   10/31/2023 0.17 (L) 0.30 - 4.20 uIU/mL Final   05/13/2022 2.62 0.40 - 4.00 mU/L Final   11/10/2020 3.46 0.40 - 4.00 mU/L Final   - Resumed on PTA synthroid levothyroxine 100 mcg/d per FT will be started tomorrow 11/4.     GERD  Sliding hiatal hernia  - Resumed on PTA Protonix 40 mg/d (IV/solution) per FT will be started tomorrow 11/4.      OA  Chronic right shoulder pain/rotator cuff arthropathy  Chronic Pain  - Pain/analgesic management per Neurosurgery.      Obesity   Suspected ANAYELI  Body mass index is 37.64 kg/m .  Increase in all-cause morbidity and mortality.   - Follow up with PCP regarding ongoing management.    - Monitor O2 saturations.    - Recommend outpatient sleep study.      Overactive bladder  - Hold PTA Ditropan XL 10 mg/d.      Migraines  - Pain/analgesic management per Neurosurgery.      Asymptomatic carotid artery stenosis  *Noted on chart review.  No interventions at this time.   "      Clinically Significant Risk Factors             # Anion Gap Metabolic Acidosis: Highest Anion Gap = 22 mmol/L in last 2 days, will monitor and treat as appropriate  # Hypoalbuminemia: Lowest albumin = 2.5 g/dL at 11/1/2023  4:19 AM, will monitor as appropriate     # Hypertension: Noted on problem list        # Obesity: Estimated body mass index is 37.64 kg/m  as calculated from the following:    Height as of this encounter: 1.651 m (5' 5\").    Weight as of this encounter: 102.6 kg (226 lb 3.1 oz).   # Moderate Malnutrition: based on nutrition assessment    # Financial/Environmental Concerns: none           Diet: NPO per Anesthesia Guidelines for Procedure/Surgery Except for: Meds     DVT Prophylaxis: Defer to primary service; subcutaneous Heparin started 11/3   Spicer Catheter: Not present  Lines: PRESENT      CVC Double Lumen Right Internal jugular Non - valved (open ended);Tunneled-Site Assessment: WDL  Cardiac Monitoring: ACTIVE order. Indication: ICU  Code Status: Full Code      Disposition Plan    Per Neurosurgery.      The patient's care was discussed with the Bedside Nurse, Patient, and Intensivist .    The patient has been discussed with Dr. Koo, who agrees with the assessment and plan at this time.    We will continue to follow.        Richie Nelson PA-C  [facility]  Securely message with the Vocera Web Console (learn more here)  Text page via [employer] Paging/Directory    ______________________________________________________________________    Chief Complaint   Elective spine surgery    History is obtained from the Intensivist, patient and EMR.      History of Present Illness   Mya Hui is a 83 year old female with a past medical history significant for Scoliosis, Lumbar radiculopathy, HTN, HLP, DM2, Hypothyroidism, CKD stage 3, Morbid obesity, GERD, Sliding hiatal hernia, Overactive bladder, OA, Chronic right shoulder pain/rotator cuff arthropathy, " Migraines, Asymptomatic carotid artery stenosis who underwent an elective T11 to pelvis robotic decompression and fusion.      Discussed with Dr. Castro and reviewed multiple notes since admission.      Surgery was performed on 10/25/23 under general anesthesia.  Surgery was complicated with development of arrhythmia with significant hypotension and noted 3.5 L blood loss per Op Note and Anesthesia postprocedure evaluation note.  Patient received IV fluids, 6 units of pRBCs and 2 units of FFP intra-op.  ST changes were noted intraoperatively.  Patient remained intubated and started on Levophed and was transferred to the ICU due to hemorrhagic shock.      Patient has remained in the ICU since 10/25/2023.  Patient remained intubated until the morning of 11/2 and has been on supplemental O2 per Oxymask currently at 4 L/m.  She required vasopressor support with Levophed until 10/27.  Patient received additional 2 units FFP, 10 units of cryo and 1 unit of PLT.      Troponin were trended and minimally elevated.  ECHO completed and without any abnormalities (LV normal size and function with EF of 65-70%.  RV normal size, function and structure.  No valve disease).      Patient had been developed fluid overload and was treated with lasix drip with good output but developed anephric rising creatinine, BUN and developed worsening acidosis.  Due to development of LISA-D Nephrology was consulted and tunnel dialysis catheter placed 10/30 and has been undergoing HD Mon, Wed, Fri (last session was today, 11/3/2023).      Patient has also developed aspiration/ventilator associated pneumonia with sputum Cx growing Enterobacter, stenotrophomonas and E. coli and was initially started on Zoysn and switched to Meropenem on 11/1/2023.  Currently continued on Meropenem and awaiting final cultures.      Patient was noted to be encephalopathic for which Vascular Neurology was consulted.  Brain MRI was completed and noted a left basal ganglia  "small focus of restricted diffusion but not a cause for encephalopathy and felt it was likely multifactorial.  EEG monitor was completed and negative and subsequently stopped.  Vascular Neurology recommended repeat brain MRI in 2-3 months.  Suspect due to uremic encephalopathy and sepsis.  Patient currently NPO and assessed by SLP.  Plan to have NG placed by Radiology and start TF.      Patient was seen in the ICU.  She was undergoing dialysis in the room.  She appeared tired and is repeatedly saying \"I need a job\".  She was able to answer that she was in the hospital but then went back to saying \"I need a job\".      Spoke with the bedside nurse.  Patient with some bruising noted on the lower extremities bilaterally and appeared to have stooled.      Past Medical History    I have reviewed this patient's medical history and updated it with pertinent information if needed.   Past Medical History:   Diagnosis Date    Acute cholecystitis 3/23/2018    Acute kidney injury (H24) 9/10/2017    Arthritis     Dehydration 9/4/2017    Diabetes type 2, controlled (H)     Elevated lactic acid level 9/10/2017    GERD (gastroesophageal reflux disease)     History of renal calculi     HTN, goal below 140/90     Hyperlipidaemia LDL goal < 100     Hypokalemia 9/3/2017    Hypothyroid     Insomnia     Left carotid stenosis     Migraine     Motion sickness     PONV (postoperative nausea and vomiting)     Sciatica of right side 6/28/2013    Type 2 diabetes mellitus without complication, without long-term current use of insulin (H) 2/16/2017   Scoliosis, Lumbar radiculopathy, HTN, HLP, DM2, Hypothyroidism, CKD stage 3, Morbid obesity, GERD, Sliding hiatal hernia, Overactive bladder, OA, Chronic right shoulder pain/rotator cuff arthropathy, Migraines, Asymptomatic carotid artery stenosis    Medications   Prior to Admission Medications   Prescriptions Last Dose Informant Patient Reported? Taking?   Incontinence Supply Disposable (PROTECTIVE " "UNDERWEAR SUPER LG) MISC   No No   Sig: 3 each daily   acetaminophen (TYLENOL) 500 MG tablet 10/25/2023 at PRN Self Yes Yes   Sig: Take 500-1,000 mg by mouth every 6 hours as needed for mild pain   blood glucose (ACCU-CHEK GUIDE) test strip   No No   Sig: Use to test blood sugar 1 times daily or as directed.   blood glucose monitoring (ACCU-CHEK FASTCLIX) lancets   No No   Sig: USE TO TEST BLOOD SUGARS ONCE DAILY   cholestyramine (QUESTRAN) 4 g packet   No No   Sig: DISSOLVE AND TAKE ONE PACKET BY MOUTH TWICE A DAY   diltiazem ER COATED BEADS (CARDIZEM CD/CARTIA XT) 120 MG 24 hr capsule 10/25/2023 at AM Self No Yes   Sig: Take 1 capsule (120 mg) by mouth 2 times daily   levothyroxine (SYNTHROID/LEVOTHROID) 100 MCG tablet 10/25/2023 at AM Self No Yes   Sig: Take 1 tablet (100 mcg) by mouth daily   lisinopril (ZESTRIL) 40 MG tablet 10/24/2023 at PM Self No Yes   Sig: Take 1 tablet (40 mg) by mouth daily   order for DME   No No   Sig: Equipment being ordered: [employer] shoes and insoles   oxybutynin ER (DITROPAN XL) 10 MG 24 hr tablet 10/24/2023 at PM Self No Yes   Sig: Take 1 tablet (10 mg) by mouth daily   pantoprazole (PROTONIX) 40 MG EC tablet 10/24/2023 at PM Self No Yes   Sig: Take 1 tablet (40 mg) by mouth 2 times daily   polyethylene glycol (MIRALAX) 17 g packet Unknown at PRN Self Yes Yes   Sig: Take 1 packet by mouth every evening as needed   potassium chloride ER (K-TAB/KLOR-CON) 10 MEQ CR tablet 10/24/2023 at PM Self No Yes   Sig: Take 1 tablet (10 mEq) by mouth 3 times daily   pravastatin (PRAVACHOL) 40 MG tablet 10/24/2023 at PM Self No Yes   Sig: Take 1 tablet (40 mg) by mouth daily      Facility-Administered Medications: None     Allergies   Allergies   Allergen Reactions    Fentanyl Nausea and Vomiting     VERY sensitive to most narcotics if not all.    Morphine And Related Nausea       Physical Exam   /83   Pulse 87   Temp 98.7  F (37.1  C) (Axillary)   Resp 10   Ht 1.651 m (5' 5\")   Wt 102.6 kg " "(226 lb 3.1 oz)   LMP  (LMP Unknown)   SpO2 95%   BMI 37.64 kg/m        Constitutional: Laying in bed undergoing dialysis and repeatedly saying \"I need a job\".  Appears ill and fatigued.    ENT: Normocephalic, without obvious abnormality, atraumatic, oral pharynx with dry mucus membranes.  Patient kept her eyes closed during entire visit.    Neck: Supple, symmetrical, trachea midline, no adenopathy.  Pulmonary: No increased work of breathing, fair air exchange, clear to auscultation bilaterally, no crackles or wheezing.  Cardiovascular: Regular rate and rhythm, normal S1 and S2, no S3 or S4, and no murmur noted.  GI: Normal bowel sounds, soft, non-distended, non-tender.  Obese.  Skin/Integumen: Bruising noted ion the lower extremities bilaterally.        Medical Decision Making       Please see A&P for additional details of medical decision making.  Greater than 90 MINUTES SPENT BY ME on the date of service doing chart review, history, exam, documentation & further activities per the note.         Data   Results for orders placed or performed during the hospital encounter of 10/25/23 (from the past 24 hour(s))   Glucose by meter   Result Value Ref Range    GLUCOSE BY METER POCT 108 (H) 70 - 99 mg/dL   Glucose by meter   Result Value Ref Range    GLUCOSE BY METER POCT 96 70 - 99 mg/dL   XR Abdomen Port 1 View    Narrative    EXAM: XR ABDOMEN PORT 1 VIEW  LOCATION: [facility]  DATE: 11/2/2023    INDICATION: For feeding tube placement.  COMPARISON: None.      Impression    IMPRESSION: NG tube tip in the distal esophagus near the EG junction. Postoperative changes projecting over the midline of the abdomen with extensive postoperative changes of posterior amber fixation thoracolumbar spine. Bowel gas pattern is nonspecific.   Nothing for obstruction or free air. No evidence for renal stones.   Glucose by meter   Result Value Ref Range    GLUCOSE BY METER POCT 92 70 - 99 mg/dL   Quantiferon-TB " Gold Plus    Specimen: Peripheral Blood    Narrative    The following orders were created for panel order Quantiferon-TB Gold Plus.  Procedure                               Abnormality         Status                     ---------                               -----------         ------                     Quantiferon TB Gold Plus...[[id number]]                      In process                 Quantiferon TB Gold Plus...[[id number]]                      In process                 Quantiferon TB Gold Plus...[[id number]]                      In process                 Quantiferon TB Gold Plus...[[id number]]                      In process                   Please view results for these tests on the individual orders.   Comprehensive metabolic panel   Result Value Ref Range    Sodium 140 135 - 145 mmol/L    Potassium 4.1 3.4 - 5.3 mmol/L    Carbon Dioxide (CO2) 22 22 - 29 mmol/L    Anion Gap 22 (H) 7 - 15 mmol/L    Urea Nitrogen 90.1 (H) 8.0 - 23.0 mg/dL    Creatinine 6.37 (H) 0.51 - 0.95 mg/dL    GFR Estimate 6 (L) >60 mL/min/1.73m2    Calcium 7.9 (L) 8.8 - 10.2 mg/dL    Chloride 96 (L) 98 - 107 mmol/L    Glucose 95 70 - 99 mg/dL    Alkaline Phosphatase 141 (H) 35 - 104 U/L     (H) 0 - 45 U/L    ALT 38 0 - 50 U/L    Protein Total 5.1 (L) 6.4 - 8.3 g/dL    Albumin 2.7 (L) 3.5 - 5.2 g/dL    Bilirubin Total 0.5 <=1.2 mg/dL   Magnesium   Result Value Ref Range    Magnesium 2.2 1.7 - 2.3 mg/dL   Phosphorus   Result Value Ref Range    Phosphorus 8.6 (H) 2.5 - 4.5 mg/dL   CBC with platelets   Result Value Ref Range    WBC Count 18.5 (H) 4.0 - 11.0 10e3/uL    RBC Count 2.82 (L) 3.80 - 5.20 10e6/uL    Hemoglobin 8.1 (L) 11.7 - 15.7 g/dL    Hematocrit 25.4 (L) 35.0 - 47.0 %    MCV 90 78 - 100 fL    MCH 28.7 26.5 - 33.0 pg    MCHC 31.9 31.5 - 36.5 g/dL    RDW 17.0 (H) 10.0 - 15.0 %    Platelet Count 183 150 - 450 10e3/uL   Bilirubin direct   Result Value Ref Range    Bilirubin Direct 0.33 (H) 0.00 - 0.30 mg/dL   Extra Tube     Narrative    The following orders were created for panel order Extra Tube.  Procedure                               Abnormality         Status                     ---------                               -----------         ------                     Extra Green Top (Lithium...[[id number]]                      Final result                 Please view results for these tests on the individual orders.   Extra Green Top (Lithium Heparin) Tube   Result Value Ref Range    Hold Specimen [facility]    Glucose by meter   Result Value Ref Range    GLUCOSE BY METER POCT 93 70 - 99 mg/dL   Glucose by meter   Result Value Ref Range    GLUCOSE BY METER POCT 84 70 - 99 mg/dL   Glucose by meter   Result Value Ref Range    GLUCOSE BY METER POCT 79 70 - 99 mg/dL

## 2023-11-03 NOTE — PROGRESS NOTES
"[facility]    Neurosurgery  Daily Note    Assessment & Plan   Procedure(s):  Thoracic 11 to pelvis robotic decompression and fusion   9 Days Post-Op    AM ROUNDS: Seen sitting in chair at bedside. States her chair is \"too tight\" and that she needs water. Denies new or worsening pain, radicular sx, or paresthesias. Follows commands. ELY drain 45mL/8hrs overnight, serosang. Dressing intact, stable, no ongoing drainage. WBC 16.7 --> 18.5.     Removed ELY in sterile fashion at bedside without issues. Folded ABD and tape placed.     Plan:  -OK for DVT ppx, confirmed with Dr. Waite  -OK for chest PT, confirmed with Dr. Waite  -neuro check q4H  -SBP parameters: normotension  -Activity placed for bracing: TLSO when out of bed, OK to be OFF with sitting, resting and hygiene  -obtain upright XR in TLSO   - Medical cares per Intensivist team, appreciate assistance  - Pain control measures as needed  - Continue abx   - Routine wound care. Continue left drain for now and monitor output   - PT/OT  - Appreciate assistance from specialties      Suki Harris DNP Student  [facility] Neurosurgery         Agree with note and personally saw patient, removed drain in sterile fashion, agree with plans  Updated Dr. Mariann Matute PA-C  [facility] Neurosurgery  [address] [address]    Tel [phone]  Pager [phone]      Interval History   Stable. Extubated yesterday. Scheduled for chest PT today.    Physical Exam   Temp: 98.5  F (36.9  C) Temp src: Oral BP: (!) 160/74 Pulse: 75   Resp: 15 SpO2: 95 % O2 Device: Oxymask Oxygen Delivery: 8 LPM  Vitals:    11/01/23 0400 11/02/23 0525 11/03/23 0600   Weight: 105.9 kg (233 lb 7.5 oz) 101.2 kg (223 lb 1.7 oz) 102.6 kg (226 lb 3.1 oz)     Vital Signs with Ranges  Temp:  [97.7  F (36.5  C)-98.5  F (36.9  C)] 98.5  F (36.9  C)  Pulse:  [75-90] 75  Resp:  [11-28] 15  BP: (128-160)/() " 160/74  MAP:  [73 mmHg-99 mmHg] 92 mmHg  Arterial Line BP: (119-162)/(42-66) 156/64  FiO2 (%):  [40 %-50 %] 50 %  SpO2:  [90 %-99 %] 95 %  I/O last 3 completed shifts:  In: 430 [I.V.:240; NG/GT:150]  Out: 105 [Drains:105]    Awake and alert. When asked questions, repeats her chair is too tight and she needs water.   Moves all extremities equally. Strength exam limited due to chair and patient participation:  Hand   R 5/5 L 5/5  Biceps  R 3/5 L 3/5  Triceps  R 3/5 L 3/5  Knee flexion R 4/5 L 4/5  Knee ext R 4/5 L 4/5  DF  R 5/5 L 5/5  PF  R 5/5 L 5/5  Sensation intact    Incision: UTV due to patient position in chair. ELY drain intact with serosanguinous drainage.     Medications    dextrose      dextrose      sodium chloride 10 mL/hr at 11/01/23 1800       - MEDICATION INSTRUCTIONS for Dialysis Patients -   Does not apply See Admin Instructions    acetylcysteine  2 mL Nebulization Q4H    B and C vitamin Complex with folic acid  5 mL Per Feeding Tube Daily    chlorhexidine  15 mL Mouth/Throat Q12H    diltiazem  60 mg Oral or Feeding Tube Q6H NATACHA    hydrALAZINE  10 mg Oral or Feeding Tube Q8H    insulin aspart  1-12 Units Subcutaneous Q4H    ipratropium - albuterol 0.5 mg/2.5 mg/3 mL  3 mL Nebulization Q4H    levothyroxine  100 mcg Oral or NG Tube QAM AC    meropenem  500 mg Intravenous Q24H    nystatin   Topical BID    pantoprazole  40 mg Per Feeding Tube QAM AC    Or    pantoprazole  40 mg Intravenous QAM AC    polyethylene glycol  17 g Oral or NG Tube Daily    senna-docusate  1 tablet Oral or NG Tube BID    sodium chloride (PF)  10 mL Intracatheter Q8H    sodium chloride (PF)  3 mL Intracatheter Q8H    traZODone  25 mg Oral At Bedtime     Plans discussed with Irina Matute PA-C who was in agreement with plans

## 2023-11-04 ENCOUNTER — APPOINTMENT (OUTPATIENT)
Dept: PHYSICAL THERAPY | Facility: CLINIC | Age: 83
DRG: 453 | End: 2023-11-04
Attending: NEUROLOGICAL SURGERY
Payer: COMMERCIAL

## 2023-11-04 ENCOUNTER — APPOINTMENT (OUTPATIENT)
Dept: SPEECH THERAPY | Facility: CLINIC | Age: 83
DRG: 453 | End: 2023-11-04
Attending: NEUROLOGICAL SURGERY
Payer: COMMERCIAL

## 2023-11-04 ENCOUNTER — APPOINTMENT (OUTPATIENT)
Dept: OCCUPATIONAL THERAPY | Facility: CLINIC | Age: 83
DRG: 453 | End: 2023-11-04
Attending: ANESTHESIOLOGY
Payer: COMMERCIAL

## 2023-11-04 LAB
ALBUMIN SERPL BCG-MCNC: 2.9 G/DL (ref 3.5–5.2)
ALP SERPL-CCNC: 148 U/L (ref 35–104)
ALT SERPL W P-5'-P-CCNC: 36 U/L (ref 0–50)
ANION GAP SERPL CALCULATED.3IONS-SCNC: 21 MMOL/L (ref 7–15)
AST SERPL W P-5'-P-CCNC: 113 U/L (ref 0–45)
BACTERIA SPT CULT: ABNORMAL
BASOPHILS # BLD AUTO: 0 10E3/UL (ref 0–0.2)
BASOPHILS NFR BLD AUTO: 0 %
BILIRUB DIRECT SERPL-MCNC: 0.32 MG/DL (ref 0–0.3)
BILIRUB SERPL-MCNC: 0.6 MG/DL
BUN SERPL-MCNC: 55.4 MG/DL (ref 8–23)
CALCIUM SERPL-MCNC: 8.1 MG/DL (ref 8.8–10.2)
CHLORIDE SERPL-SCNC: 97 MMOL/L (ref 98–107)
CREAT SERPL-MCNC: 4.5 MG/DL (ref 0.51–0.95)
DEPRECATED HCO3 PLAS-SCNC: 22 MMOL/L (ref 22–29)
EGFRCR SERPLBLD CKD-EPI 2021: 9 ML/MIN/1.73M2
EOSINOPHIL # BLD AUTO: 0.2 10E3/UL (ref 0–0.7)
EOSINOPHIL NFR BLD AUTO: 1 %
ERYTHROCYTE [DISTWIDTH] IN BLOOD BY AUTOMATED COUNT: 17.1 % (ref 10–15)
GAMMA INTERFERON BACKGROUND BLD IA-ACNC: 0.01 IU/ML
GLUCOSE BLDC GLUCOMTR-MCNC: 73 MG/DL (ref 70–99)
GLUCOSE BLDC GLUCOMTR-MCNC: 78 MG/DL (ref 70–99)
GLUCOSE BLDC GLUCOMTR-MCNC: 82 MG/DL (ref 70–99)
GLUCOSE BLDC GLUCOMTR-MCNC: 97 MG/DL (ref 70–99)
GLUCOSE SERPL-MCNC: 88 MG/DL (ref 70–99)
GRAM STAIN RESULT: ABNORMAL
GRAM STAIN RESULT: ABNORMAL
HCT VFR BLD AUTO: 27.7 % (ref 35–47)
HGB BLD-MCNC: 8.8 G/DL (ref 11.7–15.7)
HOLD SPECIMEN: NORMAL
IMM GRANULOCYTES # BLD: 0.7 10E3/UL
IMM GRANULOCYTES NFR BLD: 4 %
LYMPHOCYTES # BLD AUTO: 0.9 10E3/UL (ref 0.8–5.3)
LYMPHOCYTES NFR BLD AUTO: 6 %
M TB IFN-G BLD-IMP: NEGATIVE
M TB IFN-G CD4+ BCKGRND COR BLD-ACNC: 7.59 IU/ML
MAGNESIUM SERPL-MCNC: 2 MG/DL (ref 1.7–2.3)
MCH RBC QN AUTO: 28.7 PG (ref 26.5–33)
MCHC RBC AUTO-ENTMCNC: 31.8 G/DL (ref 31.5–36.5)
MCV RBC AUTO: 90 FL (ref 78–100)
MITOGEN IGNF BCKGRD COR BLD-ACNC: 0.18 IU/ML
MITOGEN IGNF BCKGRD COR BLD-ACNC: 0.25 IU/ML
MONOCYTES # BLD AUTO: 1.1 10E3/UL (ref 0–1.3)
MONOCYTES NFR BLD AUTO: 7 %
NEUTROPHILS # BLD AUTO: 13.2 10E3/UL (ref 1.6–8.3)
NEUTROPHILS NFR BLD AUTO: 82 %
NRBC # BLD AUTO: 0.1 10E3/UL
NRBC BLD AUTO-RTO: 1 /100
PHOSPHATE SERPL-MCNC: 7.1 MG/DL (ref 2.5–4.5)
PLATELET # BLD AUTO: 187 10E3/UL (ref 150–450)
POTASSIUM SERPL-SCNC: 4.4 MMOL/L (ref 3.4–5.3)
PROT SERPL-MCNC: 5.7 G/DL (ref 6.4–8.3)
QUANTIFERON MITOGEN: 7.6 IU/ML
QUANTIFERON NIL TUBE: 0.01 IU/ML
QUANTIFERON TB1 TUBE: 0.19 IU/ML
QUANTIFERON TB2 TUBE: 0.26
RBC # BLD AUTO: 3.07 10E6/UL (ref 3.8–5.2)
SODIUM SERPL-SCNC: 140 MMOL/L (ref 135–145)
WBC # BLD AUTO: 16.2 10E3/UL (ref 4–11)

## 2023-11-04 PROCEDURE — 94640 AIRWAY INHALATION TREATMENT: CPT | Mod: 76

## 2023-11-04 PROCEDURE — 250N000013 HC RX MED GY IP 250 OP 250 PS 637: Performed by: INTERNAL MEDICINE

## 2023-11-04 PROCEDURE — 250N000009 HC RX 250

## 2023-11-04 PROCEDURE — C9113 INJ PANTOPRAZOLE SODIUM, VIA: HCPCS | Mod: JZ | Performed by: INTERNAL MEDICINE

## 2023-11-04 PROCEDURE — 85025 COMPLETE CBC W/AUTO DIFF WBC: CPT | Performed by: PHYSICIAN ASSISTANT

## 2023-11-04 PROCEDURE — 250N000011 HC RX IP 250 OP 636: Performed by: ANESTHESIOLOGY

## 2023-11-04 PROCEDURE — 250N000011 HC RX IP 250 OP 636: Mod: JZ | Performed by: INTERNAL MEDICINE

## 2023-11-04 PROCEDURE — 99207 PR APP CREDIT; MD BILLING SHARED VISIT: CPT | Performed by: NURSE PRACTITIONER

## 2023-11-04 PROCEDURE — 97530 THERAPEUTIC ACTIVITIES: CPT | Mod: GO | Performed by: OCCUPATIONAL THERAPIST

## 2023-11-04 PROCEDURE — 250N000011 HC RX IP 250 OP 636: Performed by: INTERNAL MEDICINE

## 2023-11-04 PROCEDURE — 94640 AIRWAY INHALATION TREATMENT: CPT

## 2023-11-04 PROCEDURE — 999N000157 HC STATISTIC RCP TIME EA 10 MIN

## 2023-11-04 PROCEDURE — 250N000009 HC RX 250: Performed by: ANESTHESIOLOGY

## 2023-11-04 PROCEDURE — 99233 SBSQ HOSP IP/OBS HIGH 50: CPT | Performed by: INTERNAL MEDICINE

## 2023-11-04 PROCEDURE — 83735 ASSAY OF MAGNESIUM: CPT | Performed by: INTERNAL MEDICINE

## 2023-11-04 PROCEDURE — 80053 COMPREHEN METABOLIC PANEL: CPT | Performed by: INTERNAL MEDICINE

## 2023-11-04 PROCEDURE — 84100 ASSAY OF PHOSPHORUS: CPT | Performed by: INTERNAL MEDICINE

## 2023-11-04 PROCEDURE — 92526 ORAL FUNCTION THERAPY: CPT | Mod: GN | Performed by: SPEECH-LANGUAGE PATHOLOGIST

## 2023-11-04 PROCEDURE — 82248 BILIRUBIN DIRECT: CPT | Performed by: INTERNAL MEDICINE

## 2023-11-04 PROCEDURE — 97110 THERAPEUTIC EXERCISES: CPT | Mod: GO | Performed by: OCCUPATIONAL THERAPIST

## 2023-11-04 PROCEDURE — 99232 SBSQ HOSP IP/OBS MODERATE 35: CPT | Performed by: STUDENT IN AN ORGANIZED HEALTH CARE EDUCATION/TRAINING PROGRAM

## 2023-11-04 PROCEDURE — 99418 PROLNG IP/OBS E/M EA 15 MIN: CPT | Performed by: INTERNAL MEDICINE

## 2023-11-04 PROCEDURE — 97530 THERAPEUTIC ACTIVITIES: CPT | Mod: GP

## 2023-11-04 PROCEDURE — 97167 OT EVAL HIGH COMPLEX 60 MIN: CPT | Mod: GO | Performed by: OCCUPATIONAL THERAPIST

## 2023-11-04 PROCEDURE — 120N000001 HC R&B MED SURG/OB

## 2023-11-04 RX ORDER — NITROGLYCERIN 0.4 MG/1
0.4 TABLET SUBLINGUAL EVERY 5 MIN PRN
Status: DISCONTINUED | OUTPATIENT
Start: 2023-11-04 | End: 2023-12-01 | Stop reason: HOSPADM

## 2023-11-04 RX ORDER — LIDOCAINE 40 MG/G
CREAM TOPICAL
Status: DISCONTINUED | OUTPATIENT
Start: 2023-11-04 | End: 2023-12-01 | Stop reason: HOSPADM

## 2023-11-04 RX ORDER — LIDOCAINE HYDROCHLORIDE 20 MG/ML
JELLY TOPICAL ONCE
Status: COMPLETED | OUTPATIENT
Start: 2023-11-04 | End: 2023-11-04

## 2023-11-04 RX ORDER — LIDOCAINE HYDROCHLORIDE 20 MG/ML
JELLY TOPICAL
Status: COMPLETED
Start: 2023-11-04 | End: 2023-11-04

## 2023-11-04 RX ORDER — METOCLOPRAMIDE HYDROCHLORIDE 5 MG/ML
5 INJECTION INTRAMUSCULAR; INTRAVENOUS ONCE
Status: COMPLETED | OUTPATIENT
Start: 2023-11-04 | End: 2023-11-04

## 2023-11-04 RX ADMIN — Medication 2 SPRAY: at 21:22

## 2023-11-04 RX ADMIN — IPRATROPIUM BROMIDE AND ALBUTEROL SULFATE 3 ML: .5; 3 SOLUTION RESPIRATORY (INHALATION) at 07:18

## 2023-11-04 RX ADMIN — HEPARIN SODIUM 5000 UNITS: 5000 INJECTION, SOLUTION INTRAVENOUS; SUBCUTANEOUS at 17:01

## 2023-11-04 RX ADMIN — IPRATROPIUM BROMIDE AND ALBUTEROL SULFATE 3 ML: .5; 3 SOLUTION RESPIRATORY (INHALATION) at 22:34

## 2023-11-04 RX ADMIN — Medication 2 SPRAY: at 11:25

## 2023-11-04 RX ADMIN — LIDOCAINE HYDROCHLORIDE: 20 JELLY TOPICAL at 11:20

## 2023-11-04 RX ADMIN — IPRATROPIUM BROMIDE AND ALBUTEROL SULFATE 3 ML: .5; 3 SOLUTION RESPIRATORY (INHALATION) at 10:53

## 2023-11-04 RX ADMIN — PANTOPRAZOLE SODIUM 40 MG: 40 INJECTION, POWDER, FOR SOLUTION INTRAVENOUS at 08:34

## 2023-11-04 RX ADMIN — NYSTATIN: 100000 CREAM TOPICAL at 21:41

## 2023-11-04 RX ADMIN — IPRATROPIUM BROMIDE AND ALBUTEROL SULFATE 3 ML: .5; 3 SOLUTION RESPIRATORY (INHALATION) at 14:55

## 2023-11-04 RX ADMIN — HEPARIN SODIUM 5000 UNITS: 5000 INJECTION, SOLUTION INTRAVENOUS; SUBCUTANEOUS at 08:34

## 2023-11-04 RX ADMIN — LABETALOL HYDROCHLORIDE 10 MG: 5 INJECTION INTRAVENOUS at 23:10

## 2023-11-04 RX ADMIN — HEPARIN SODIUM 5000 UNITS: 5000 INJECTION, SOLUTION INTRAVENOUS; SUBCUTANEOUS at 00:11

## 2023-11-04 RX ADMIN — Medication 2 SPRAY: at 18:51

## 2023-11-04 RX ADMIN — IPRATROPIUM BROMIDE AND ALBUTEROL SULFATE 3 ML: .5; 3 SOLUTION RESPIRATORY (INHALATION) at 19:31

## 2023-11-04 RX ADMIN — MICONAZOLE NITRATE: 2 POWDER TOPICAL at 21:22

## 2023-11-04 RX ADMIN — NYSTATIN: 100000 CREAM TOPICAL at 08:39

## 2023-11-04 RX ADMIN — HYDRALAZINE HYDROCHLORIDE 10 MG: 10 TABLET ORAL at 05:58

## 2023-11-04 RX ADMIN — MEROPENEM 500 MG: 500 INJECTION, POWDER, FOR SOLUTION INTRAVENOUS at 16:59

## 2023-11-04 RX ADMIN — IPRATROPIUM BROMIDE AND ALBUTEROL SULFATE 3 ML: .5; 3 SOLUTION RESPIRATORY (INHALATION) at 03:02

## 2023-11-04 RX ADMIN — Medication 2 SPRAY: at 08:39

## 2023-11-04 RX ADMIN — DILTIAZEM HYDROCHLORIDE 60 MG: 60 TABLET, FILM COATED ORAL at 05:58

## 2023-11-04 ASSESSMENT — ACTIVITIES OF DAILY LIVING (ADL)
ADLS_ACUITY_SCORE: 42
ADLS_ACUITY_SCORE: 46
ADLS_ACUITY_SCORE: 42
PREVIOUS_RESPONSIBILITIES: DRIVING;FINANCES;MEDICATION MANAGEMENT;SHOPPING;LAUNDRY;HOUSEKEEPING;MEAL PREP
ADLS_ACUITY_SCORE: 42

## 2023-11-04 NOTE — PROGRESS NOTES
[facility]    Neurosurgery  Daily Note    Assessment & Plan   Procedure(s):  Thoracic 11 to pelvis robotic decompression and fusion   10 Days Post-Op    AM ROUNDS: Seen lying in bed. Denies any back pain, radicular leg pain, paresthesias, or new weakness. Following commands. ELY drain removed yesterday. Awaiting upright XR when able.     Plan:  -Ok to tx out of ICU per NSG perspective  -OK for DVT ppx, confirmed with Dr. Waite  -OK for chest PT, confirmed with Dr. Waite  -Neuro check q4H  -Goal normotension  -TLSO when out of bed, OK to be OFF with sitting, resting and hygiene  -Obtain upright XR in TLSO   - Appreciate assistance from medical team  - Pain control measures as needed  - Routine wound care, dressing removed  - PT/OT    Rhina Gregory CNP  [facility] Neurosurgery  [address] [address]  Tel [phone]  Fax [phone]  Text page via [employer] Paging/Directory      Interval History   Stable. Extubated yesterday. Scheduled for chest PT today.    Physical Exam   Temp: 98.3  F (36.8  C) Temp src: Oral BP: (!) 143/75 Pulse: 89   Resp: 15 SpO2: 98 % O2 Device: Oxymizer cannula Oxygen Delivery: 5 LPM  Vitals:    11/02/23 0525 11/03/23 0600 11/04/23 0000   Weight: 101.2 kg (223 lb 1.7 oz) 102.6 kg (226 lb 3.1 oz) 100.9 kg (222 lb 7.1 oz)     Vital Signs with Ranges  Temp:  [98.1  F (36.7  C)-99.2  F (37.3  C)] 98.3  F (36.8  C)  Pulse:  [76-96] 89  Resp:  [0-31] 15  BP: (128-165)/() 143/75  SpO2:  [89 %-98 %] 98 %  I/O last 3 completed shifts:  In: 290 [I.V.:290]  Out: 3500 [Other:3500]    Awake and alert  Knee flexion R 4/5 L 4/5  Knee ext R 4/5 L 4/5  DF  R 5/5 L 5/5  PF  R 5/5 L 5/5  Sensation intact    Incision: Staples intact, ELY site intact with steri strip applied    Medications    sodium chloride Stopped (11/04/23 0700)       - MEDICATION INSTRUCTIONS for Dialysis Patients -   Does not apply See Admin Instructions    artificial  saliva  2 spray Swish & Spit 4x Daily    B and C vitamin Complex with folic acid  5 mL Per Feeding Tube Daily    diltiazem  60 mg Oral or Feeding Tube Q6H NATACHA    heparin ANTICOAGULANT  5,000 Units Subcutaneous Q8H    hydrALAZINE  10 mg Oral or Feeding Tube Q8H    insulin aspart  1-12 Units Subcutaneous Q4H    ipratropium - albuterol 0.5 mg/2.5 mg/3 mL  3 mL Nebulization Q4H    levothyroxine  100 mcg Oral or NG Tube QAM AC    meropenem  500 mg Intravenous Q24H    nystatin   Topical BID    [START ON 11/5/2023] pantoprazole  40 mg Per Feeding Tube QAM AC    Or    [START ON 11/5/2023] pantoprazole  40 mg Intravenous QAM AC    polyethylene glycol  17 g Oral or NG Tube Daily    protein modular  1 packet Per Feeding Tube Daily    senna-docusate  1 tablet Oral or NG Tube BID    sodium chloride (PF)  10 mL Intracatheter Q8H    sodium chloride (PF)  3 mL Intracatheter Q8H    traZODone  25 mg Oral At Bedtime

## 2023-11-04 NOTE — PROGRESS NOTES
Renal Medicine Progress Note            Assessment/Plan:     Assessment: Mya Hui is a 83-year-old woman with PMH DM2, hypothyroidism, HLD, HTN admitted 10/25/2023 due to hemorrhagic shock after lumbar spinal fusion.  Course complicated by LISA and encephalopathy.    LISA-D  CKD IIIA  Anasarca  Hypoalbuminemia  Baseline creatinine 1.1-1.4, EGFR 45-50.  No history of appreciable proteinuria.  Worsening creatinine in setting of hemorrhagic shock, most currently has ATN as a result.  Has developed fluid overload, Lasix drip started with good output, however renal function continues to have anephric rising creatinine daily despite this.  She has a rising BUN and worsening acidosis, reasonable to start dialysis to improve mentation and improve fluid status.  -Tunneled dialysis catheter placed by IR on 10/30  -Next HD on Monday  -Daily BMP  -Renally dose meds  -will hold off on dialysis over the weekend to assess for renal recovery    Anion gap metabolic acidosis, improved  Largely due to renal failure.     Hemorrhagic shock, resolved  S/p lumbar fusion  Shock liver, improved  Underwent lumbar fusion on 10/25, had 2 L blood loss during surgery.  Developed hemorrhagic shock thereafter.  Multiple transfusions and pressors, currently not requiring pressors.  ELY drains still in place.    Enterobacter PNA   Sputum cx with enterobacter. Zosyn switched to meropenem 11/1.     Plan/Recs:  1) HD Monday unless signs of renal recovery  2) no HD planned over the weekend, will assess for renal recovery.  Please page if acute issues arise over weekend    Reviewed notes from NSMELISSA, intensivist, RNs.    Chance Chacon MD   [facility] consultants  Office: [phone]          Interval History:     Currently on 4 to 5 L nasal mask  Denies shortness of breath  Continues to have weak voice  Issues with swallowing, pending feeding tube placement  She denies other complaints  Has some spontaneous voids, not recorded due to  "incontinence, does not appear to be large amounts  Plan for transfer out of ICU today         Medications and Allergies:      - MEDICATION INSTRUCTIONS for Dialysis Patients -   Does not apply See Admin Instructions    artificial saliva  2 spray Swish & Spit 4x Daily    B and C vitamin Complex with folic acid  5 mL Per Feeding Tube Daily    diltiazem  60 mg Oral or Feeding Tube Q6H NATACHA    heparin ANTICOAGULANT  5,000 Units Subcutaneous Q8H    hydrALAZINE  10 mg Oral or Feeding Tube Q8H    insulin aspart  1-12 Units Subcutaneous Q4H    ipratropium - albuterol 0.5 mg/2.5 mg/3 mL  3 mL Nebulization Q4H    levothyroxine  100 mcg Oral or NG Tube QAM AC    meropenem  500 mg Intravenous Q24H    nystatin   Topical BID    [START ON 11/5/2023] pantoprazole  40 mg Per Feeding Tube QAM AC    Or    [START ON 11/5/2023] pantoprazole  40 mg Intravenous QAM AC    polyethylene glycol  17 g Oral or NG Tube Daily    protein modular  1 packet Per Feeding Tube Daily    senna-docusate  1 tablet Oral or NG Tube BID    sodium chloride (PF)  10 mL Intracatheter Q8H    sodium chloride (PF)  3 mL Intracatheter Q8H    sodium chloride (PF)  3 mL Intracatheter Q8H    traZODone  25 mg Oral At Bedtime        Allergies   Allergen Reactions    Fentanyl Nausea and Vomiting     VERY sensitive to most narcotics if not all.    Morphine And Related Nausea            Physical Exam:   Vitals were reviewed  /77   Pulse 87   Temp 97.8  F (36.6  C)   Resp 23   Ht 1.651 m (5' 5\")   Wt 100.9 kg (222 lb 7.1 oz)   LMP  (LMP Unknown)   SpO2 96%   BMI 37.02 kg/m      Wt Readings from Last 3 Encounters:   11/04/23 100.9 kg (222 lb 7.1 oz)   10/19/23 95 kg (209 lb 8 oz)   09/28/23 97.1 kg (214 lb)       Intake/Output Summary (Last 24 hours) at 10/30/2023 0922  Last data filed at 10/30/2023 0800  Gross per 24 hour   Intake 1717.43 ml   Output 1370 ml   Net 347.43 ml       GENERAL APPEARANCE: NAD  HEENT: Dry mucous membranes  RESP: Right-sided lung coarse " rhonchi  CV: RRR, no murmur  ABDOMEN: soft, nontender  EXTREMITIES/SKIN: 2+ pitting edema in upper extremities, 2+ in lower extremities.  NEURO: Awake and alert, somewhat confused, answers questions appropriately         Data:     BMP  Recent Labs   Lab 11/04/23  1551 11/04/23  1128 11/04/23  0804 11/04/23  0409 11/03/23  1712 11/03/23  1645 11/03/23  0502 11/03/23  0458 11/02/23  0800 11/02/23  0521 11/01/23  0754 11/01/23 0419   NA  --   --   --  140  --   --   --  140  --  139  --  139   POTASSIUM  --   --   --  4.4  --  4.0  --  4.1  --  4.1  --  4.8   CHLORIDE  --   --   --  97*  --   --   --  96*  --  98  --  100   TERESA  --   --   --  8.1*  --   --   --  7.9*  --  7.8*  --  7.4*   CO2  --   --   --  22  --   --   --  22  --  22  --  18*   BUN  --   --   --  55.4*  --   --   --  90.1*  --  75.6*  --  106.7*   CR  --   --   --  4.50*  --  2.66*  --  6.37*  --  4.88*  --  6.72*   GLC 97 73 82 88   < >  --    < > 95   < > 108*  115*   < > 116*    < > = values in this interval not displayed.     CBC  Recent Labs   Lab 11/04/23  0409 11/03/23  0458 11/02/23  0521 11/01/23 0419   WBC 16.2* 18.5* 16.7* 10.9   HGB 8.8* 8.1* 8.1* 8.1*   HCT 27.7* 25.4* 24.5* 24.4*   MCV 90 90 87 88    183 156 117*     Lab Results   Component Value Date     (H) 11/04/2023    ALT 36 11/04/2023    ALKPHOS 148 (H) 11/04/2023    BILITOTAL 0.6 11/04/2023    CHARLETTE 42 10/31/2023     Lab Results   Component Value Date    INR 1.57 (H) 10/27/2023     Color Urine (no units)   Date Value   10/26/2023 Yellow   03/08/2019 Yellow     Appearance Urine (no units)   Date Value   10/26/2023 Slightly Cloudy (A)   03/08/2019 Cloudy     Glucose Urine (mg/dL)   Date Value   10/26/2023 500 (A)   03/08/2019 Negative     Bilirubin Urine (no units)   Date Value   10/26/2023 Negative   03/08/2019 Negative     Ketones Urine (mg/dL)   Date Value   10/26/2023 Trace (A)   03/08/2019 Negative     Specific Gravity Urine (no units)   Date Value    10/26/2023 1.024   03/08/2019 1.020     pH Urine   Date Value   10/26/2023 5.5   03/08/2019 5.0 pH     Protein Albumin Urine (mg/dL)   Date Value   10/26/2023 300 (A)   03/08/2019 100 (A)     Urobilinogen Urine   Date Value   01/17/2022 0.2 E.U./dL   08/10/2017 0.2 EU/dL     Nitrite Urine (no units)   Date Value   10/26/2023 Negative   03/08/2019 Negative     Leukocyte Esterase Urine (no units)   Date Value   10/26/2023 Negative   03/08/2019 Moderate (A)         Attestation:  I have reviewed today's vital signs, notes, medications, labs and imaging.    Chance Chacon MD  [facility] - Nephrology  Office: [phone]

## 2023-11-04 NOTE — PROGRESS NOTES
11/04/23 1401   Appointment Info   Signing Clinician's Name / Credentials (OT) Annamaria Piedra OTR/L   Rehab Comments (OT) Initial Evaluation   Living Environment   People in Home alone   Current Living Arrangements apartment   Transportation Anticipated family or friend will provide   Self-Care   Equipment Currently Used at Home walker, rolling  (uses 4ww for all mobility.)   Activity/Exercise/Self-Care Comment Pt reports was I with all ADL/IADl's prior to admit.   Instrumental Activities of Daily Living (IADL)   Previous Responsibilities driving;finances;medication management;shopping;laundry;housekeeping;meal prep   General Information   Onset of Illness/Injury or Date of Surgery 10/30/23   Referring Physician Estuardo Glover MD   Patient/Family Therapy Goal Statement (OT) none stated   Additional Occupational Profile Info/Pertinent History of Current Problem Mya Hui is a 83 year old female admitted on 10/25/2023.     Past medical history significant for Scoliosis, Lumbar radiculopathy, HTN, HLP, DM2, Hypothyroidism, CKD stage 3, Morbid obesity, GERD, Sliding hiatal hernia, Overactive bladder, OA, Chronic right shoulder pain/rotator cuff arthropathy, Migraines, Asymptomatic carotid artery stenosis who underwent an elective T11 to pelvis robotic decompression and fusion.  Surgery was complicated with development of arrhythmia with significant hypotension and noted 3.5 L blood loss per Op Note and Anesthesia postprocedure evaluation note.  Pt was transferred to ICU due to hemorrhagic shock  and intubated. Pt now extubated.   Existing Precautions/Restrictions brace worn when out of bed;fall;spinal  (mild thickness teaspoon, see board. TLSO brace in room)   Cognitive Status Examination   Orientation Status orientation to person, place and time  (Pt did reports she thought she had a stroke, pt educated that she didn't have a stroke but her BP did drop after surgery.)   Affect/Mental Status  (Cognitive) WFL;flat/blunted affect   Follows Commands over 90% accuracy;delayed response/completion;increased processing time needed   Cognitive Status Comments Continue to monitor.   Sensory   Sensory Comments denies   Pain Assessment   Patient Currently in Pain No   Range of Motion Comprehensive   Comment, General Range of Motion B UE PROM/AAROM WFL, swelling noted in R hand   Strength Comprehensive (MMT)   Comment, General Manual Muscle Testing (MMT) Assessment overall impaired strength 1-2/5 B UE strength   Coordination   Upper Extremity Coordination Left UE impaired;Right UE impaired   Coordination Comments impaired B coordination due to weakness and puffy R hand   Bed Mobility   Rolling Left [place] (Bed Mobility) maximum assist (25% patient effort);2 person assist   Rolling Right [place] (Bed Mobility) maximum assist (25% patient effort);2 person assist   Transfers   Transfer Comments Pt requires use of ceiling lift to transfer to bedside chair and back to bed   Upper Body Dressing Assessment/Training   [place] Level (Upper Body Dressing) dependent (less than 25% patient effort)   Lower Body Dressing Assessment/Training   [place] Level (Lower Body Dressing) dependent (less than 25% patient effort)   Eating/Self Feeding   [place] Level (Feeding) dependent (less than 25% patient effort)   Clinical Impression   Criteria for Skilled Therapeutic Interventions Met (OT) Yes, treatment indicated   OT Diagnosis impaired I with ADl's and functional mobility   OT Problem List-Impairments impacting ADL problems related to;activity tolerance impaired;balance;cognition;range of motion (ROM);strength;post-surgical precautions;postural control;coordination   Assessment of Occupational Performance 5 or more Performance Deficits   Identified Performance Deficits impaired I with all ADLs ie dressing, toileting, bathing, shopping, community mobility, cleaning, cooking, laundry etc   Planned Therapy  Interventions (OT) ADL retraining;cognition;transfer training;home program guidelines;progressive activity/exercise;motor coordination training;ROM   Clinical Decision Making Complexity (OT) comprehensive assessment/high complexity   Risk & Benefits of therapy have been explained evaluation/treatment results reviewed;care plan/treatment goals reviewed;risks/benefits reviewed;current/potential barriers reviewed;participants voiced agreement with care plan;participants included;patient   OT Total Evaluation Time   OT Eval, High Complexity Minutes (82642) 10   OT Goals   Therapy Frequency (OT) Daily   OT Predicted Duration/Target Date for Goal Attainment 11/17/23   OT Goals Hygiene/Grooming;Upper Body Dressing;Toilet Transfer/Toileting;Cognition;OT Goal 1   OT: Hygiene/Grooming minimal assist   OT: Upper Body Dressing Minimal assist;within precautions;including orthotic   OT: Toilet Transfer/Toileting Minimal assist;toilet transfer;cleaning and garment management;using adaptive equipment;within precautions  (BSC transfer)   OT: Cognitive Patient/caregiver will verbalize understanding of cognitive assessment results/recommendations as needed for safe discharge planning  (as able or defer to rehab)   OT: Goal 1 Pt able to sit at EOB with SBA and no LOB in preparation for ADLs and functional mobility   Therapeutic Procedures/Exercise   Therapeutic Procedure: strength, endurance, ROM, flexibillity minutes (10011) 15   Symptoms Noted During/After Treatment fatigue   Treatment Detail/Skilled Intervention OT: completed B UE PROM/AAROM with increased effort and cues for correct positioning, pt with increased swelling in R Hand and overall weakness, approx 2-3 reps of each joint and plane. RN wanting to A pt back to bed.   Therapeutic Activities   Therapeutic Activity Minutes (68062) 10   Symptoms noted during/after treatment fatigue   Treatment Detail/Skilled Intervention OT: pt seated in recliner upon OT arrival. RN  transferred to chair via ceiling lift. pt sleeping and awoke easily and agreed to therapy. OT: pt unable to reach for spoon in cup due to weakness. pt required therapist to put spoon handle in pts right hand and with hand over hand max A bring spoon to mouth. pt required use of ceiling lift to transfer back to bed. rolling side to side in bed with max A x 2 with cues for hand placement and log rollt ech. vitals monitored appeared stable.   OT Discharge Planning   OT Plan OT Plan; transfer to chair via ceiling lift with A of 2, TLSO on. then g/h in chair or seated at edge of chair  as able, B UE AAROM/AROM, reach/grasp tasks,   OT Discharge Recommendation (DC Rec) Acute Rehab Center-Motivated patient will benefit from intensive, interdisciplinary therapy.  Anticipate will be able to tolerate 3 hours of therapy per day   OT Rationale for DC Rec Per chart, pt lives in an apt alone and I with ADl/IADLs and use 4ww for mobility. pt currenlty limited due to lethargy, impaired cognition ie increased processing time, impaired balance, strength, actiity tolerance, spinal preautions, etc. pt will require cont'd intensive therapy to increase ADL and functionalmobility independence and safety to PLOF.   OT Brief overview of current status Max A x2 rolling in bed, ceiling lift for transfers, max A to feed self  and max to dependent with all ADL's   Total Session Time   Timed Code Treatment Minutes 25   Total Session Time (sum of timed and untimed services) 35

## 2023-11-04 NOTE — PLAN OF CARE
"  Problem: Adult Inpatient Plan of Care  Goal: Plan of Care Review  Description: The Plan of Care Review/Shift note should be completed every shift.  The Outcome Evaluation is a brief statement about your assessment that the patient is improving, declining, or no change.  This information will be displayed automatically on your shift  note.  Outcome: Progressing  Flowsheets (Taken 11/4/2023 1823)  Outcome Evaluation: Patient transferred to [facility] All belongings sent with patient.  Plan of Care Reviewed With: (daughter christal)   patient   child  Overall Patient Progress: improving  Goal: Patient-Specific Goal (Individualized)  Description: You can add care plan individualizations to a care plan. Examples of Individualization might be:  \"Parent requests to be called daily at 9am for status\", \"I have a hard time hearing out of my right ear\", or \"Do not touch me to wake me up as it startles  me\".  Outcome: Progressing  Goal: Absence of Hospital-Acquired Illness or Injury  Outcome: Progressing  Intervention: Identify and Manage Fall Risk  Recent Flowsheet Documentation  Taken 11/4/2023 1600 by Song Tenorio RN  Safety Promotion/Fall Prevention:   activity supervised   clutter free environment maintained   increased rounding and observation   increase visualization of patient   safety round/check completed  Taken 11/4/2023 1200 by Song Tenorio RN  Safety Promotion/Fall Prevention:   activity supervised   clutter free environment maintained   increased rounding and observation   increase visualization of patient   safety round/check completed  Taken 11/4/2023 0800 by Song Tenorio, RN  Safety Promotion/Fall Prevention:   activity supervised   clutter free environment maintained   increased rounding and observation   increase visualization of patient   safety round/check completed  Intervention: Prevent Skin Injury  Recent Flowsheet Documentation  Taken 11/4/2023 1600 by Song Tenorio, RN  Body Position:   turned   " lower extremity elevated   upper extremity elevated   left  Skin Protection: skin to device areas padded  Device Skin Pressure Protection:   positioning supports utilized   pressure points protected   skin-to-device areas padded  Taken 11/4/2023 1400 by Song Tenorio RN  Body Position:   turned   right   lower extremity elevated   upper extremity elevated  Taken 11/4/2023 1200 by Song Tenorio RN  Skin Protection: skin to device areas padded  Device Skin Pressure Protection:   positioning supports utilized   pressure points protected   skin-to-device areas padded  Taken 11/4/2023 1000 by Song Tenorio RN  Body Position:   turned   right   lower extremity elevated   upper extremity elevated  Taken 11/4/2023 0800 by Song Tenorio RN  Body Position:   turned   left   head repositioned, left   upper extremity elevated   lower extremity elevated  Skin Protection: skin to device areas padded  Device Skin Pressure Protection:   positioning supports utilized   pressure points protected   skin-to-device areas padded  Intervention: Prevent Infection  Recent Flowsheet Documentation  Taken 11/4/2023 1600 by Song Tenorio RN  Infection Prevention:   single patient room provided   personal protective equipment utilized   hand hygiene promoted   equipment surfaces disinfected  Taken 11/4/2023 1200 by Song Tenorio RN  Infection Prevention:   single patient room provided   personal protective equipment utilized   hand hygiene promoted   equipment surfaces disinfected  Taken 11/4/2023 0800 by Song Tenorio RN  Infection Prevention:   single patient room provided   personal protective equipment utilized   hand hygiene promoted   equipment surfaces disinfected  Goal: Optimal Comfort and Wellbeing  Outcome: Progressing  Intervention: Provide Person-Centered Care  Recent Flowsheet Documentation  Taken 11/4/2023 1600 by Song Tenorio RN  Trust Relationship/Rapport:   care explained   choices provided   emotional  support provided  Taken 11/4/2023 1200 by Song Tenorio, RN  Trust Relationship/Rapport:   care explained   choices provided   emotional support provided  Taken 11/4/2023 0800 by Song Tenorio, RN  Trust Relationship/Rapport:   care explained   choices provided   emotional support provided  Goal: Readiness for Transition of Care  Outcome: Progressing   Goal Outcome Evaluation:      Plan of Care Reviewed With: patient, child (daughter christal)    Overall Patient Progress: improvingOverall Patient Progress: improving    Outcome Evaluation: Patient transferred to [facility]. All belongings sent with patient.

## 2023-11-04 NOTE — PLAN OF CARE
Problem: Adult Inpatient Plan of Care  Goal: Plan of Care Review  Description: The Plan of Care Review/Shift note should be completed every shift.  The Outcome Evaluation is a brief statement about your assessment that the patient is improving, declining, or no change.  This information will be displayed automatically on your shift  note.  Outcome: Progressing  Flowsheets (Taken 11/4/2023 0623)  Outcome Evaluation: Patient more alert overnight. Passed bedside swallow for PO meds-speech to evaulate patient today. Plan for XR guided NG placement today.  Plan of Care Reviewed With: patient  Overall Patient Progress: improving     Problem: Spinal Surgery  Goal: Optimal Functional Ability  Intervention: Optimize Functional Status  Recent Flowsheet Documentation  Taken 11/4/2023 0600 by Jordyn Dawn RN  Positioning/Transfer Devices:   pillows   wedge   in use  Taken 11/4/2023 0400 by Jordyn Dawn, RN  Activity Management: activity adjusted per tolerance  Positioning/Transfer Devices:   pillows   wedge   in use  Taken 11/4/2023 0200 by Jordyn Dawn RN  Positioning/Transfer Devices:   pillows   wedge   in use  Taken 11/4/2023 0000 by Jordyn Dawn RN  Activity Management: activity adjusted per tolerance  Positioning/Transfer Devices:   pillows   wedge   in use  Taken 11/3/2023 2000 by Jordyn Danw RN  Activity Management: activity adjusted per tolerance   Goal Outcome Evaluation:      Plan of Care Reviewed With: patient    Overall Patient Progress: improvingOverall Patient Progress: improving    Outcome Evaluation: Patient more alert overnight. Passed bedside swallow for PO meds-speech to evaulate patient today. Plan for XR guided NG placement today.

## 2023-11-05 ENCOUNTER — APPOINTMENT (OUTPATIENT)
Dept: OCCUPATIONAL THERAPY | Facility: CLINIC | Age: 83
DRG: 453 | End: 2023-11-05
Attending: NEUROLOGICAL SURGERY
Payer: COMMERCIAL

## 2023-11-05 ENCOUNTER — APPOINTMENT (OUTPATIENT)
Dept: SPEECH THERAPY | Facility: CLINIC | Age: 83
DRG: 453 | End: 2023-11-05
Attending: NEUROLOGICAL SURGERY
Payer: COMMERCIAL

## 2023-11-05 LAB
ALBUMIN SERPL BCG-MCNC: 3 G/DL (ref 3.5–5.2)
ALP SERPL-CCNC: 131 U/L (ref 35–104)
ALT SERPL W P-5'-P-CCNC: 28 U/L (ref 0–50)
ANION GAP SERPL CALCULATED.3IONS-SCNC: 21 MMOL/L (ref 7–15)
AST SERPL W P-5'-P-CCNC: 72 U/L (ref 0–45)
BACTERIA BLD CULT: NO GROWTH
BACTERIA BLD CULT: NO GROWTH
BILIRUB DIRECT SERPL-MCNC: 0.28 MG/DL (ref 0–0.3)
BILIRUB SERPL-MCNC: 0.5 MG/DL
BUN SERPL-MCNC: 74.3 MG/DL (ref 8–23)
CALCIUM SERPL-MCNC: 8.3 MG/DL (ref 8.8–10.2)
CHLORIDE SERPL-SCNC: 97 MMOL/L (ref 98–107)
CREAT SERPL-MCNC: 6.15 MG/DL (ref 0.51–0.95)
DEPRECATED HCO3 PLAS-SCNC: 22 MMOL/L (ref 22–29)
EGFRCR SERPLBLD CKD-EPI 2021: 6 ML/MIN/1.73M2
GLUCOSE BLDC GLUCOMTR-MCNC: 103 MG/DL (ref 70–99)
GLUCOSE BLDC GLUCOMTR-MCNC: 103 MG/DL (ref 70–99)
GLUCOSE BLDC GLUCOMTR-MCNC: 107 MG/DL (ref 70–99)
GLUCOSE BLDC GLUCOMTR-MCNC: 84 MG/DL (ref 70–99)
GLUCOSE BLDC GLUCOMTR-MCNC: 85 MG/DL (ref 70–99)
GLUCOSE BLDC GLUCOMTR-MCNC: 85 MG/DL (ref 70–99)
GLUCOSE BLDC GLUCOMTR-MCNC: 92 MG/DL (ref 70–99)
GLUCOSE BLDC GLUCOMTR-MCNC: 93 MG/DL (ref 70–99)
GLUCOSE BLDC GLUCOMTR-MCNC: 96 MG/DL (ref 70–99)
GLUCOSE SERPL-MCNC: 105 MG/DL (ref 70–99)
MAGNESIUM SERPL-MCNC: 2.3 MG/DL (ref 1.7–2.3)
PHOSPHATE SERPL-MCNC: 9.7 MG/DL (ref 2.5–4.5)
POTASSIUM SERPL-SCNC: 4.4 MMOL/L (ref 3.4–5.3)
PROT SERPL-MCNC: 5.6 G/DL (ref 6.4–8.3)
SARS-COV-2 RNA RESP QL NAA+PROBE: NEGATIVE
SODIUM SERPL-SCNC: 140 MMOL/L (ref 135–145)

## 2023-11-05 PROCEDURE — 82040 ASSAY OF SERUM ALBUMIN: CPT | Performed by: INTERNAL MEDICINE

## 2023-11-05 PROCEDURE — 94640 AIRWAY INHALATION TREATMENT: CPT

## 2023-11-05 PROCEDURE — 83735 ASSAY OF MAGNESIUM: CPT | Performed by: INTERNAL MEDICINE

## 2023-11-05 PROCEDURE — 250N000011 HC RX IP 250 OP 636: Performed by: INTERNAL MEDICINE

## 2023-11-05 PROCEDURE — 87635 SARS-COV-2 COVID-19 AMP PRB: CPT | Performed by: STUDENT IN AN ORGANIZED HEALTH CARE EDUCATION/TRAINING PROGRAM

## 2023-11-05 PROCEDURE — 82248 BILIRUBIN DIRECT: CPT | Performed by: INTERNAL MEDICINE

## 2023-11-05 PROCEDURE — 92526 ORAL FUNCTION THERAPY: CPT | Mod: GN | Performed by: SPEECH-LANGUAGE PATHOLOGIST

## 2023-11-05 PROCEDURE — 250N000011 HC RX IP 250 OP 636: Mod: JZ | Performed by: NEUROLOGICAL SURGERY

## 2023-11-05 PROCEDURE — C9113 INJ PANTOPRAZOLE SODIUM, VIA: HCPCS | Mod: JZ | Performed by: NEUROLOGICAL SURGERY

## 2023-11-05 PROCEDURE — 250N000011 HC RX IP 250 OP 636: Mod: JZ | Performed by: INTERNAL MEDICINE

## 2023-11-05 PROCEDURE — 99207 PR CDG-CUT & PASTE-POTENTIAL IMPACT ON LEVEL: CPT | Performed by: INTERNAL MEDICINE

## 2023-11-05 PROCEDURE — 84100 ASSAY OF PHOSPHORUS: CPT | Performed by: INTERNAL MEDICINE

## 2023-11-05 PROCEDURE — 250N000009 HC RX 250: Performed by: ANESTHESIOLOGY

## 2023-11-05 PROCEDURE — 97535 SELF CARE MNGMENT TRAINING: CPT | Mod: GO | Performed by: OCCUPATIONAL THERAPIST

## 2023-11-05 PROCEDURE — 120N000001 HC R&B MED SURG/OB

## 2023-11-05 PROCEDURE — 99233 SBSQ HOSP IP/OBS HIGH 50: CPT | Performed by: INTERNAL MEDICINE

## 2023-11-05 PROCEDURE — 250N000013 HC RX MED GY IP 250 OP 250 PS 637: Performed by: INTERNAL MEDICINE

## 2023-11-05 PROCEDURE — 999N000157 HC STATISTIC RCP TIME EA 10 MIN

## 2023-11-05 PROCEDURE — 94640 AIRWAY INHALATION TREATMENT: CPT | Mod: 76

## 2023-11-05 PROCEDURE — 250N000013 HC RX MED GY IP 250 OP 250 PS 637: Performed by: NURSE PRACTITIONER

## 2023-11-05 RX ORDER — LEVOFLOXACIN 5 MG/ML
500 INJECTION, SOLUTION INTRAVENOUS
Status: DISCONTINUED | OUTPATIENT
Start: 2023-11-05 | End: 2023-11-14

## 2023-11-05 RX ADMIN — MICONAZOLE NITRATE: 2 POWDER TOPICAL at 08:30

## 2023-11-05 RX ADMIN — LEVOFLOXACIN 500 MG: 5 INJECTION, SOLUTION INTRAVENOUS at 14:33

## 2023-11-05 RX ADMIN — HEPARIN SODIUM 5000 UNITS: 5000 INJECTION, SOLUTION INTRAVENOUS; SUBCUTANEOUS at 23:16

## 2023-11-05 RX ADMIN — IPRATROPIUM BROMIDE AND ALBUTEROL SULFATE 3 ML: .5; 3 SOLUTION RESPIRATORY (INHALATION) at 11:55

## 2023-11-05 RX ADMIN — DILTIAZEM HYDROCHLORIDE 60 MG: 60 TABLET, FILM COATED ORAL at 22:00

## 2023-11-05 RX ADMIN — HYDRALAZINE HYDROCHLORIDE 10 MG: 10 TABLET ORAL at 20:51

## 2023-11-05 RX ADMIN — DILTIAZEM HYDROCHLORIDE 60 MG: 60 TABLET, FILM COATED ORAL at 17:32

## 2023-11-05 RX ADMIN — PANTOPRAZOLE SODIUM 40 MG: 40 INJECTION, POWDER, FOR SOLUTION INTRAVENOUS at 08:30

## 2023-11-05 RX ADMIN — TRAZODONE HYDROCHLORIDE 25 MG: 50 TABLET ORAL at 20:50

## 2023-11-05 RX ADMIN — HEPARIN SODIUM 5000 UNITS: 5000 INJECTION, SOLUTION INTRAVENOUS; SUBCUTANEOUS at 08:30

## 2023-11-05 RX ADMIN — IPRATROPIUM BROMIDE AND ALBUTEROL SULFATE 3 ML: .5; 3 SOLUTION RESPIRATORY (INHALATION) at 03:44

## 2023-11-05 RX ADMIN — IPRATROPIUM BROMIDE AND ALBUTEROL SULFATE 3 ML: .5; 3 SOLUTION RESPIRATORY (INHALATION) at 16:23

## 2023-11-05 RX ADMIN — HEPARIN SODIUM 5000 UNITS: 5000 INJECTION, SOLUTION INTRAVENOUS; SUBCUTANEOUS at 16:21

## 2023-11-05 RX ADMIN — HYDROXYZINE HYDROCHLORIDE 10 MG: 10 TABLET ORAL at 23:16

## 2023-11-05 RX ADMIN — IPRATROPIUM BROMIDE AND ALBUTEROL SULFATE 3 ML: .5; 3 SOLUTION RESPIRATORY (INHALATION) at 07:39

## 2023-11-05 RX ADMIN — NYSTATIN: 100000 CREAM TOPICAL at 08:31

## 2023-11-05 RX ADMIN — IPRATROPIUM BROMIDE AND ALBUTEROL SULFATE 3 ML: .5; 3 SOLUTION RESPIRATORY (INHALATION) at 23:00

## 2023-11-05 RX ADMIN — Medication 2 SPRAY: at 14:27

## 2023-11-05 RX ADMIN — MICONAZOLE NITRATE: 2 POWDER TOPICAL at 20:52

## 2023-11-05 RX ADMIN — NYSTATIN: 100000 CREAM TOPICAL at 20:53

## 2023-11-05 RX ADMIN — Medication 2 SPRAY: at 08:30

## 2023-11-05 RX ADMIN — IPRATROPIUM BROMIDE AND ALBUTEROL SULFATE 3 ML: .5; 3 SOLUTION RESPIRATORY (INHALATION) at 19:34

## 2023-11-05 RX ADMIN — Medication 2 SPRAY: at 20:53

## 2023-11-05 RX ADMIN — HEPARIN SODIUM 5000 UNITS: 5000 INJECTION, SOLUTION INTRAVENOUS; SUBCUTANEOUS at 01:55

## 2023-11-05 RX ADMIN — Medication 2 SPRAY: at 17:32

## 2023-11-05 ASSESSMENT — ACTIVITIES OF DAILY LIVING (ADL)
ADLS_ACUITY_SCORE: 46
ADLS_ACUITY_SCORE: 42
ADLS_ACUITY_SCORE: 50
ADLS_ACUITY_SCORE: 46
ADLS_ACUITY_SCORE: 38
ADLS_ACUITY_SCORE: 42
ADLS_ACUITY_SCORE: 46
ADLS_ACUITY_SCORE: 50
ADLS_ACUITY_SCORE: 46
ADLS_ACUITY_SCORE: 42

## 2023-11-05 NOTE — PROVIDER NOTIFICATION
"MD Notification    Notified Person: MD    Notified Person Name: Luis Antonio Machado    Notification Date/Time: 11/5/23 2126    Notification Interaction: Jaky    Purpose of Notification:     Patient does not have an NG tube currently. has PO meds ordered, diltiazem, hydralazine and trazodone. Can we switch them to IV? Per speech recommendation, \"NPO, ice chips only\". Patient still has diet order as well. She is lethargic, encephalopathic and is unsafe to take PO       Orders Received: \"Ok. No need for IV meds at this time. Keep her NPO \"    Comments:    "

## 2023-11-05 NOTE — PROGRESS NOTES
House BURKE brief note:    Minimal anisocoria possibly 2/2 recent ipratropium nebulizer.    Contacted by nursing via VHX at 2155 noting pt found to have unequal pupils, R 2mm, L 3mm, requesting bedside assessment.  Presented to pt's bedside.  At time of arrival, pt lying in bed at 40 degrees, awake, alert, in no overt distress with intermittent dry cough noted, pt requesting for ice chips and then applesauce.  Nursing notes pt was found to have unequal pupils just prior to contacting house BURKE, R 2mm, L 3mm.  At time of arrival pt's pupils very minimally unequal, R 3mm, L 3.5mm, both reactive to light.  Nursing confirms pupils have improved from recent assessment.  Pt able to smile, puff out cheeks, raise eye brows equally and protrude tongue midline.  Pt's speech clear, able to follow commands and make needs known.  Pt able to shrug shoulder; however, R shoulder appears slightly weaker than L shoulder, likely secondary to chronic R shoulder pain and rotator cuff arthropathy.  Pt able to grasp bilateral hands, L > R, able to lift LUE off the bed without pronator drift but only able to slightly lift R hand off the bed.  Nursing notes pt has RUE weakness at baseline; reviewed nursing flowsheets, confirmed ongoing RUE weakness.  Pt able to dorsi/plantar flex bilateral feet weakly and sluggishly bend knees bilaterally.     Noted MRI brain results from 10/31/23 with subsequent CT head WO follow up on 11/1 with no acute hemorrhage noted.  In setting of no obvious acute focal neuro deficit noted and anisocoria nearly resolved, will defer stat CT head WO at this time.  Discussed with nursing to update house BURKE stat if worsening anisocoria and would likely obtain stat CT head WO at that time.  Pt remains on Q4H neuro checks.          MAYI Vega, CNP  House BURKE    I spent 15 min at pt's bedside, reviewing EMR and updating plan of care.

## 2023-11-05 NOTE — PROGRESS NOTES
Brief nephrology note    Chart reviewed.  She continues to have an anephric rise in her creatinine.  No significant urine output, no signs of renal recovery as of yet.  We will plan on HD tomorrow and continuing MWF schedule while inpatient.  Patient will need care coordinator to apply for outpatient dialysis unit.  Dialysis labs are ordered and resulted.    Chance Chacon MD   [facility], Nephrology

## 2023-11-05 NOTE — PROGRESS NOTES
Attempted feeding tube placement @ bedside but meeting resistance 40 cm in and not able to advance further. Dr. Anguiano notified to order x-ray feeding tube placement and it will be done tomorrow. Patient has history of hiatal hernia.

## 2023-11-05 NOTE — PROVIDER NOTIFICATION
MD Notification    Notified Person: MD    Notified Person Name: Fito Kerr      Notification Date/Time: 0048 11/5/23    Notification Interaction: Vocera/ Phone    Purpose of Notification: PO Hydralazine was held at bedtime d/t being NPO. SBP has been between 160-170's. Gave PRN labetalol, not effective. Can we have PRN Hydralazine IV?   Also, patient has been coughing a lot. Has aspiration/ventilator associated PNA. Was not tested for covid. Do you think it would be reasonable to swab her? Thanks!      Orders Received: No need for further intervention unless SBP >180.    Covid swab ordered.    Comments:

## 2023-11-05 NOTE — PROGRESS NOTES
[facility]    Neurosurgery  Daily Note    Assessment & Plan   Procedure(s):  Thoracic 11 to pelvis robotic decompression and fusion   11 Days Post-Op    AM ROUNDS: Seen lying in bed. Denies any back pain, radicular leg pain, paresthesias, or new weakness. Following commands. Awaiting upright XR when able. Brace available.     Plan:  -OK for DVT ppx, confirmed with Dr. Waite  -OK for chest PT, confirmed with Dr. Waite  -Incentive spirometer  -Neuro check q4H  -Goal normotension  -TLSO when out of bed, OK to be OFF with sitting, resting and hygiene  -Obtain upright XR in TLSO when able  - Appreciate assistance from medical team  - Pain control measures as needed  - Routine wound care, dressing removed  - PT/OT    Discussed with Dr. Chauncey Gregory, PONCHO  [facility] Neurosurgery  [address] [address]  Tel [phone]  Fax [phone]  Text page via [email or web address] Paging/Directory      Interval History   Stable. [place] [facility] [place]     Physical Exam   Temp: 97.9  F (36.6  C) Temp src: Axillary BP: (!) 157/78 Pulse: 81   Resp: 22 SpO2: 95 % O2 Device: Nasal cannula Oxygen Delivery: 2 LPM  Vitals:    11/02/23 0525 11/03/23 0600 11/04/23 0000   Weight: 101.2 kg (223 lb 1.7 oz) 102.6 kg (226 lb 3.1 oz) 100.9 kg (222 lb 7.1 oz)     Vital Signs with Ranges  Temp:  [97.4  F (36.3  C)-98.2  F (36.8  C)] 97.9  F (36.6  C)  Pulse:  [72-90] 81  Resp:  [10-26] 22  BP: (135-167)/(73-85) 157/78  SpO2:  [90 %-98 %] 95 %  I/O last 3 completed shifts:  In: 198 [P.O.:55; I.V.:143]  Out: -     Neurologic Assessment:  Awake and alert  Knee flexion R 4/5 L 4/5  Knee ext R 4/5 L 4/5  DF  R 5/5 L 5/5  PF  R 5/5 L 5/5  Sensation intact    Incision: Staples intact.    Medications    sodium chloride Stopped (11/04/23 0700)       - MEDICATION INSTRUCTIONS for Dialysis Patients -   Does not apply See Admin Instructions    artificial saliva  2 spray Swish & Spit 4x Daily    B  and C vitamin Complex with folic acid  5 mL Per Feeding Tube Daily    diltiazem  60 mg Oral or Feeding Tube Q6H NATACHA    heparin ANTICOAGULANT  5,000 Units Subcutaneous Q8H    hydrALAZINE  10 mg Oral or Feeding Tube Q8H    insulin aspart  1-12 Units Subcutaneous Q4H    ipratropium - albuterol 0.5 mg/2.5 mg/3 mL  3 mL Nebulization Q4H    levofloxacin  500 mg Intravenous Q48H    levothyroxine  100 mcg Oral or NG Tube QAM AC    nystatin   Topical BID    pantoprazole  40 mg Per Feeding Tube QAM AC    Or    pantoprazole  40 mg Intravenous QAM AC    polyethylene glycol  17 g Oral or NG Tube Daily    protein modular  1 packet Per Feeding Tube Daily    senna-docusate  1 tablet Oral or NG Tube BID    sodium chloride (PF)  10 mL Intracatheter Q8H    sodium chloride (PF)  3 mL Intracatheter Q8H    sodium chloride (PF)  3 mL Intracatheter Q8H    traZODone  25 mg Oral At Bedtime

## 2023-11-05 NOTE — PLAN OF CARE
[id number] [facility]  Orientation: Alert and oriented to self. Waxes and wanes with orientation.  Vitals/Pain: VSS ex, hypertensive (-170's) On 2-4L O2  via Oximyzer cannula. Attempted to wean off but desats to mid 80's.   Diet: NPO.  Neuro: Pupils unequal at start of shift L >R but reactive. Requested house BURKE to assess patient, no change at this time. Towards end of shift, patient verbalized that pupils are unequal at baseline. R sided weakness, unchanged. [place].  Respiratory: Lung sound coarse, rhonchi heard intermittently. Not following commands for IS. Frequent congested cough, nonproductive.  Tele: SR  CMS: Denies numbness and tigling.   GI/: On HD, still making urine. Loose BM x1. Incontinent of bowel and bladder.   Lines/ Drains: R chest tunneled cath. L midline and R PIV, SL. Midline flushes with well with good blood return.   Skin/Wounds: Mid to lower back incision with staples, CHIKI. L Lower back and R forearm skin tear, Mepilex CDI. Scattered bruising. Generalized edema.  Mobility: Not OOB. Turn/ repo Q2.Needs TLSO brace when OOB.  Labs: BG Q4.  Consults: Hospitalist, Neurosurg, PT, OT, ST following  Plan: IR Feeding tube placement

## 2023-11-06 ENCOUNTER — APPOINTMENT (OUTPATIENT)
Dept: PHYSICAL THERAPY | Facility: CLINIC | Age: 83
DRG: 453 | End: 2023-11-06
Attending: NEUROLOGICAL SURGERY
Payer: COMMERCIAL

## 2023-11-06 LAB
ALBUMIN SERPL BCG-MCNC: 2.9 G/DL (ref 3.5–5.2)
ALP SERPL-CCNC: 126 U/L (ref 35–104)
ALT SERPL W P-5'-P-CCNC: 26 U/L (ref 0–50)
ANION GAP SERPL CALCULATED.3IONS-SCNC: 19 MMOL/L (ref 7–15)
AST SERPL W P-5'-P-CCNC: 64 U/L (ref 0–45)
BILIRUB DIRECT SERPL-MCNC: 0.26 MG/DL (ref 0–0.3)
BILIRUB SERPL-MCNC: 0.5 MG/DL
BUN SERPL-MCNC: 89.6 MG/DL (ref 8–23)
CALCIUM SERPL-MCNC: 8.1 MG/DL (ref 8.8–10.2)
CHLORIDE SERPL-SCNC: 98 MMOL/L (ref 98–107)
CREAT SERPL-MCNC: 7.18 MG/DL (ref 0.51–0.95)
DEPRECATED HCO3 PLAS-SCNC: 23 MMOL/L (ref 22–29)
EGFRCR SERPLBLD CKD-EPI 2021: 5 ML/MIN/1.73M2
GLUCOSE BLDC GLUCOMTR-MCNC: 100 MG/DL (ref 70–99)
GLUCOSE BLDC GLUCOMTR-MCNC: 101 MG/DL (ref 70–99)
GLUCOSE BLDC GLUCOMTR-MCNC: 102 MG/DL (ref 70–99)
GLUCOSE BLDC GLUCOMTR-MCNC: 102 MG/DL (ref 70–99)
GLUCOSE BLDC GLUCOMTR-MCNC: 126 MG/DL (ref 70–99)
GLUCOSE BLDC GLUCOMTR-MCNC: 92 MG/DL (ref 70–99)
GLUCOSE SERPL-MCNC: 98 MG/DL (ref 70–99)
MAGNESIUM SERPL-MCNC: 2.2 MG/DL (ref 1.7–2.3)
PHOSPHATE SERPL-MCNC: 10.5 MG/DL (ref 2.5–4.5)
POTASSIUM SERPL-SCNC: 4.8 MMOL/L (ref 3.4–5.3)
PROT SERPL-MCNC: 5.8 G/DL (ref 6.4–8.3)
SODIUM SERPL-SCNC: 140 MMOL/L (ref 135–145)

## 2023-11-06 PROCEDURE — 99207 PR CDG-CUT & PASTE-POTENTIAL IMPACT ON LEVEL: CPT | Performed by: INTERNAL MEDICINE

## 2023-11-06 PROCEDURE — 999N000157 HC STATISTIC RCP TIME EA 10 MIN

## 2023-11-06 PROCEDURE — 99232 SBSQ HOSP IP/OBS MODERATE 35: CPT | Performed by: INTERNAL MEDICINE

## 2023-11-06 PROCEDURE — 90937 HEMODIALYSIS REPEATED EVAL: CPT

## 2023-11-06 PROCEDURE — 94640 AIRWAY INHALATION TREATMENT: CPT | Mod: 76

## 2023-11-06 PROCEDURE — 84100 ASSAY OF PHOSPHORUS: CPT | Performed by: INTERNAL MEDICINE

## 2023-11-06 PROCEDURE — 250N000011 HC RX IP 250 OP 636: Performed by: INTERNAL MEDICINE

## 2023-11-06 PROCEDURE — 94640 AIRWAY INHALATION TREATMENT: CPT

## 2023-11-06 PROCEDURE — C9113 INJ PANTOPRAZOLE SODIUM, VIA: HCPCS | Mod: JZ | Performed by: NEUROLOGICAL SURGERY

## 2023-11-06 PROCEDURE — 250N000013 HC RX MED GY IP 250 OP 250 PS 637: Performed by: NURSE PRACTITIONER

## 2023-11-06 PROCEDURE — 97112 NEUROMUSCULAR REEDUCATION: CPT | Mod: GP

## 2023-11-06 PROCEDURE — 250N000013 HC RX MED GY IP 250 OP 250 PS 637: Performed by: INTERNAL MEDICINE

## 2023-11-06 PROCEDURE — 82248 BILIRUBIN DIRECT: CPT | Performed by: INTERNAL MEDICINE

## 2023-11-06 PROCEDURE — 258N000003 HC RX IP 258 OP 636: Performed by: STUDENT IN AN ORGANIZED HEALTH CARE EDUCATION/TRAINING PROGRAM

## 2023-11-06 PROCEDURE — 250N000011 HC RX IP 250 OP 636: Mod: JZ | Performed by: NEUROLOGICAL SURGERY

## 2023-11-06 PROCEDURE — 99233 SBSQ HOSP IP/OBS HIGH 50: CPT | Performed by: INTERNAL MEDICINE

## 2023-11-06 PROCEDURE — 634N000001 HC RX 634: Mod: JZ | Performed by: STUDENT IN AN ORGANIZED HEALTH CARE EDUCATION/TRAINING PROGRAM

## 2023-11-06 PROCEDURE — 83735 ASSAY OF MAGNESIUM: CPT | Performed by: INTERNAL MEDICINE

## 2023-11-06 PROCEDURE — 82040 ASSAY OF SERUM ALBUMIN: CPT | Performed by: INTERNAL MEDICINE

## 2023-11-06 PROCEDURE — 250N000011 HC RX IP 250 OP 636: Performed by: STUDENT IN AN ORGANIZED HEALTH CARE EDUCATION/TRAINING PROGRAM

## 2023-11-06 PROCEDURE — 120N000001 HC R&B MED SURG/OB

## 2023-11-06 PROCEDURE — 250N000009 HC RX 250: Performed by: ANESTHESIOLOGY

## 2023-11-06 PROCEDURE — 97530 THERAPEUTIC ACTIVITIES: CPT | Mod: GP

## 2023-11-06 RX ORDER — IPRATROPIUM BROMIDE AND ALBUTEROL SULFATE 2.5; .5 MG/3ML; MG/3ML
3 SOLUTION RESPIRATORY (INHALATION) EVERY 4 HOURS PRN
Status: DISCONTINUED | OUTPATIENT
Start: 2023-11-06 | End: 2023-12-01 | Stop reason: HOSPADM

## 2023-11-06 RX ADMIN — TRAZODONE HYDROCHLORIDE 25 MG: 50 TABLET ORAL at 21:52

## 2023-11-06 RX ADMIN — HYDRALAZINE HYDROCHLORIDE 10 MG: 10 TABLET ORAL at 21:52

## 2023-11-06 RX ADMIN — Medication 2 SPRAY: at 12:17

## 2023-11-06 RX ADMIN — HEPARIN SODIUM 5000 UNITS: 5000 INJECTION, SOLUTION INTRAVENOUS; SUBCUTANEOUS at 07:57

## 2023-11-06 RX ADMIN — MICONAZOLE NITRATE: 2 POWDER TOPICAL at 21:59

## 2023-11-06 RX ADMIN — IPRATROPIUM BROMIDE AND ALBUTEROL SULFATE 3 ML: .5; 3 SOLUTION RESPIRATORY (INHALATION) at 03:19

## 2023-11-06 RX ADMIN — IPRATROPIUM BROMIDE AND ALBUTEROL SULFATE 3 ML: .5; 3 SOLUTION RESPIRATORY (INHALATION) at 07:44

## 2023-11-06 RX ADMIN — SODIUM CHLORIDE 300 ML: 9 INJECTION, SOLUTION INTRAVENOUS at 16:19

## 2023-11-06 RX ADMIN — NYSTATIN: 100000 CREAM TOPICAL at 08:48

## 2023-11-06 RX ADMIN — POLYETHYLENE GLYCOL 3350 17 G: 17 POWDER, FOR SOLUTION ORAL at 08:03

## 2023-11-06 RX ADMIN — ACETAMINOPHEN 650 MG: 325 TABLET, FILM COATED ORAL at 12:17

## 2023-11-06 RX ADMIN — SODIUM CHLORIDE 250 ML: 9 INJECTION, SOLUTION INTRAVENOUS at 16:19

## 2023-11-06 RX ADMIN — Medication 2 SPRAY: at 08:03

## 2023-11-06 RX ADMIN — DILTIAZEM HYDROCHLORIDE 60 MG: 60 TABLET, FILM COATED ORAL at 06:06

## 2023-11-06 RX ADMIN — DILTIAZEM HYDROCHLORIDE 60 MG: 60 TABLET, FILM COATED ORAL at 21:52

## 2023-11-06 RX ADMIN — DILTIAZEM HYDROCHLORIDE 60 MG: 60 TABLET, FILM COATED ORAL at 17:13

## 2023-11-06 RX ADMIN — SENNOSIDES AND DOCUSATE SODIUM 1 TABLET: 8.6; 5 TABLET ORAL at 21:52

## 2023-11-06 RX ADMIN — Medication 2 SPRAY: at 21:53

## 2023-11-06 RX ADMIN — DILTIAZEM HYDROCHLORIDE 60 MG: 60 TABLET, FILM COATED ORAL at 12:17

## 2023-11-06 RX ADMIN — NYSTATIN: 100000 CREAM TOPICAL at 22:00

## 2023-11-06 RX ADMIN — HEPARIN SODIUM 1600 UNITS: 1000 INJECTION INTRAVENOUS; SUBCUTANEOUS at 16:20

## 2023-11-06 RX ADMIN — LEVOTHYROXINE SODIUM 100 MCG: 100 TABLET ORAL at 06:06

## 2023-11-06 RX ADMIN — HYDRALAZINE HYDROCHLORIDE 10 MG: 10 TABLET ORAL at 06:06

## 2023-11-06 RX ADMIN — SENNOSIDES AND DOCUSATE SODIUM 1 TABLET: 8.6; 5 TABLET ORAL at 08:03

## 2023-11-06 RX ADMIN — MICONAZOLE NITRATE: 2 POWDER TOPICAL at 07:57

## 2023-11-06 RX ADMIN — HEPARIN SODIUM 1600 UNITS: 1000 INJECTION INTRAVENOUS; SUBCUTANEOUS at 16:21

## 2023-11-06 RX ADMIN — EPOETIN ALFA-EPBX 4000 UNITS: 4000 INJECTION, SOLUTION INTRAVENOUS; SUBCUTANEOUS at 16:18

## 2023-11-06 RX ADMIN — Medication: at 16:21

## 2023-11-06 RX ADMIN — PANTOPRAZOLE SODIUM 40 MG: 40 INJECTION, POWDER, FOR SOLUTION INTRAVENOUS at 06:05

## 2023-11-06 RX ADMIN — HEPARIN SODIUM 5000 UNITS: 5000 INJECTION, SOLUTION INTRAVENOUS; SUBCUTANEOUS at 17:13

## 2023-11-06 RX ADMIN — Medication 2 SPRAY: at 17:14

## 2023-11-06 RX ADMIN — ACETAMINOPHEN 650 MG: 325 TABLET, FILM COATED ORAL at 08:57

## 2023-11-06 ASSESSMENT — ACTIVITIES OF DAILY LIVING (ADL)
ADLS_ACUITY_SCORE: 42
ADLS_ACUITY_SCORE: 50
ADLS_ACUITY_SCORE: 42
ADLS_ACUITY_SCORE: 50
ADLS_ACUITY_SCORE: 46
ADLS_ACUITY_SCORE: 42
ADLS_ACUITY_SCORE: 50
ADLS_ACUITY_SCORE: 50
ADLS_ACUITY_SCORE: 42
ADLS_ACUITY_SCORE: 42

## 2023-11-06 NOTE — PROGRESS NOTES
[facility]    Neurosurgery  Daily Note    Assessment & Plan   Procedure(s):  Thoracic 11 to pelvis robotic decompression and fusion   12 Days Post-Op    AM ROUNDS: pain well controlled. Denies new paresthesias.     Plan:  -OK for DVT ppx, confirmed with Dr. Waite  -OK for chest PT, confirmed with Dr. Waite  -Incentive spirometer  -Neuro check q4H  -Goal normotension  -TLSO when out of bed, OK to be OFF with sitting, resting and hygiene  -Obtain upright XR in TLSO when able  - Appreciate assistance from medical team  - Pain control measures as needed  - Routine wound care, dressing removed over the weekend  - PT/OT    Plans reviewed with Dr. Mariann Matute PAGELA  [facility] Neurosurgery  [facility]  [address] [facility]  [address] [place] [address]    Tel [phone]  Pager [phone]    Principal Problem:    S/P lumbar spinal fusion  Active Problems:    Acute kidney failure with tubular necrosis (H)    Brandy Matute, PA-ROBIN    Interval History   Stable     Physical Exam   Temp: 98.7  F (37.1  C) Temp src: Axillary BP: (!) 142/73 Pulse: 78   Resp: 25 SpO2: 96 % O2 Device: Nasal cannula Oxygen Delivery: 1 LPM  Vitals:    11/02/23 0525 11/03/23 0600 11/04/23 0000   Weight: 223 lb 1.7 oz (101.2 kg) 226 lb 3.1 oz (102.6 kg) 222 lb 7.1 oz (100.9 kg)     Vital Signs with Ranges  Temp:  [98  F (36.7  C)-98.7  F (37.1  C)] 98.7  F (37.1  C)  Pulse:  [73-85] 78  Resp:  [17-29] 25  BP: (108-169)/(54-85) 142/73  SpO2:  [91 %-97 %] 96 %  I/O last 3 completed shifts:  In: 483 [P.O.:450; I.V.:33]  Out: 200 [Urine:200]    Awake, alert, following commands  Incision is c/d/I with staples in place; open to air   Pain limiting right deltoid 2/2 shoulder  Generalized weakness throughout likely 2/2 deconditioning while in hospital  Remainder BUE strength is symmetric  Able to lift BL legs off bed  Symmetric strength at knees and PF DF   Sensation intact per  patient     Medications       - MEDICATION INSTRUCTIONS for Dialysis Patients -   Does not apply See Admin Instructions    artificial saliva  2 spray Swish & Spit 4x Daily    B and C vitamin Complex with folic acid  5 mL Per Feeding Tube Daily    diltiazem  60 mg Oral or Feeding Tube Q6H NATACHA    epoetin stephie-epbx  4,000 Units Intravenous Once in dialysis/CRRT    sodium chloride (PF) 0.9%  10 mL Intracatheter Once in dialysis/CRRT    Followed by    heparin  1.3-2.6 mL Intracatheter Once in dialysis/CRRT    sodium chloride (PF) 0.9%  10 mL Intracatheter Once in dialysis/CRRT    Followed by    heparin  1.3-2.6 mL Intracatheter Once in dialysis/CRRT    heparin ANTICOAGULANT  5,000 Units Subcutaneous Q8H    hydrALAZINE  10 mg Oral or Feeding Tube Q8H    insulin aspart  1-7 Units Subcutaneous TID AC    insulin aspart  1-5 Units Subcutaneous At Bedtime    levofloxacin  500 mg Intravenous Q48H    levothyroxine  100 mcg Oral or NG Tube QAM AC    - MEDICATION INSTRUCTIONS -   Does not apply Once    nystatin   Topical BID    pantoprazole  40 mg Per Feeding Tube QAM AC    polyethylene glycol  17 g Oral or NG Tube Daily    protein modular  1 packet Per Feeding Tube Daily    senna-docusate  1 tablet Oral or NG Tube BID    sodium chloride (PF)  10 mL Intracatheter Q8H    sodium chloride (PF)  3 mL Intracatheter Q8H    sodium chloride (PF)  3 mL Intracatheter Q8H    sodium chloride 0.9%  250 mL Intravenous Once in dialysis/CRRT    sodium chloride 0.9%  300 mL Hemodialysis Machine Once    traZODone  25 mg Oral At Bedtime       Plans discussed with Dr. Waite who was in agreement with plans    Irina Matute PA-C  [facility] Neurosurgery  [address] [address]    Tel [phone]  Pager [phone]

## 2023-11-06 NOTE — PROGRESS NOTES
Spoke w/ SLP, message from speech listed below:    FYI - recommend a puree diet and mildly thick liquids by spoon only with precautions and nursing supervision, ok for single ice chips, crush meds, hold po if respiratory status declines; pt would like some more po that I left in pt room. I also texted MD montalvo possibly holding TF replacement

## 2023-11-06 NOTE — PROGRESS NOTES
Potassium   Date Value Ref Range Status   11/06/2023 4.8 3.4 - 5.3 mmol/L Final   05/13/2022 4.5 3.4 - 5.3 mmol/L Final   01/06/2021 4.3 3.4 - 5.3 mmol/L Final     Potassium POCT   Date Value Ref Range Status   10/25/2023 4.7 3.4 - 5.3 mmol/L Final     Comment:     000     Hemoglobin   Date Value Ref Range Status   11/04/2023 8.8 (L) 11.7 - 15.7 g/dL Final   09/23/2020 13.1 11.7 - 15.7 g/dL Final     Creatinine   Date Value Ref Range Status   11/06/2023 7.18 (H) 0.51 - 0.95 mg/dL Final   01/06/2021 1.04 0.52 - 1.04 mg/dL Final     Urea Nitrogen   Date Value Ref Range Status   11/06/2023 89.6 (H) 8.0 - 23.0 mg/dL Final   05/13/2022 17 7 - 30 mg/dL Final   01/06/2021 21 7 - 30 mg/dL Final     Sodium   Date Value Ref Range Status   11/06/2023 140 135 - 145 mmol/L Final     Comment:     Reference intervals for this test were updated on 09/26/2023 to more accurately reflect our healthy population. There may be differences in the flagging of prior results with similar values performed with this method. Interpretation of those prior results can be made in the context of the updated reference intervals.    01/06/2021 144 133 - 144 mmol/L Final     INR   Date Value Ref Range Status   10/27/2023 1.57 (H) 0.85 - 1.15 Final       DIALYSIS PROCEDURE NOTE  Hepatitis status of previous patient on machine log was checked and verified ok to use with this patients hepatitis status.  Patient dialyzed for 3 hrs. on a K2 bath with a net fluid removal of  1.6L.  A BFR of 350-400 ml/min was obtained via a CVC.   The treatment plan was discussed with Dr. Spain during the treatment.    Total heparin received during the treatment: 0 units.     Line flushed, clamped and capped with heparin 1:1000 1.6 mL (1600 units) per lumen    Meds  given: epo   Complications: none      Person educated: patient. Knowledge base minimal. Barriers to learning: confusion. Educated on procedure via verbal mode. Patient verbalized understanding.     ICEBOAT?  Timeout performed pre-treatment  I: Patient was identified using 2 identifiers  C:  Consent Signed Yes  E: Equipment preventative maintenance is current and dialysis delivery system OK to use  B: Hepatitis B    Latest Reference Range & Units 10/30/23 12:50   Hep B Surface Agn Nonreactive  Nonreactive   Hepatitis B Surface Antibody Instrument Value <8.00 m[IU]/mL 15.68   Hepatitis B Surface Antibody  Reactive     O: Dialysis orders present and complete prior to treatment  A: Vascular access verified and assessed prior to treatment  T: Treatment was performed at a clinically appropriate time  ?: Patient was allowed to ask questions and address concerns prior to treatment  See Adult Hemodialysis flowsheet in [employer] for further details and post assessment.  Machine water alarm in place and functioning. Transducer pods intact and checked every 15min.   Pt returned via bed.  Chlorine/Chloramine water system checked every 4 hours.      Patient repositioned every 2 hours during the treatment.  Post treatment report given to Acacia Brown RN

## 2023-11-06 NOTE — PROGRESS NOTES
[facility]    Hospitalist Progress Note    Interval History   - A&O x2 to self and year, mental status slowly improving  - Dysphagia improving, per speech yesterday hold of tube feed placement for another day or so and monitor oral intake  - D/w nurse  - Updated daughter April    Assessment & Plan   Summary: Mya Hui is a 83 year old female with PMH Scoliosis, Lumbar radiculopathy, HTN, HLP, DM2, Hypothyroidism, CKD stage 3, Morbid obesity, GERD, Sliding hiatal hernia, Overactive bladder, OA, Chronic right shoulder pain/rotator cuff arthropathy, Migraines, Asymptomatic carotid artery stenosis who was admitted on 10/25/2023 with an elective T11 to pelvis robotic decompression and fusion.  Surgery was performed on 10/25/23 under general anesthesia.  Surgery was complicated with development of arrhythmia with significant hypotension and noted 3.5 L blood loss per Op Note and Anesthesia postprocedure evaluation note.  Patient received IV fluids, 6 units of pRBCs and 2 units of FFP intra-op.  ST changes were noted intraoperatively.  Patient remained intubated and started on Levophed and was transferred to the ICU due to hemorrhagic shock.  She required vasopressor support with Levophed until 10/27.   Patient remained intubated until the morning of 11/2 and has been on supplemental O2 per Oxymask currently at 4 L/m.   Patient received additional 2 units FFP, 10 units of cryo and 1 unit of PLT. Transferred to hospitalist on 11/3.    Post-op hemorrhagic shock, resolved  Shocked liver, improving  Type 2 MI due to hemorrhagic shock, improved  Surgery was complicated with development of arrhythmia with significant hypotension and noted 3.5 L blood loss per Op Note and Anesthesia postprocedure evaluation note.  Patient received IV fluids, 6 units of pRBCs and 2 units of FFP intra-op. Patient received additional 2 units FFP, 10 units of cryo and 1 unit of PLT while in the ICU. LFTs continue to  improve 11/5, 11/6  - Monitor liver function    Encephalopathy, multifactorial, improving  Incidental finding of left basal ganglia restricted diffusion  Patient was noted to be encephalopathic for which Vascular Neurology was consulted.  Brain MRI was completed and noted a left basal ganglia small focus of restricted diffusion but not a cause for encephalopathy and felt it was likely multifactorial.  EEG monitor was completed and negative and subsequently stopped.  Suspect due to uremic encephalopathy and sepsis.   Encephalopathy improved 11/6  Delirium prevention:   - Reorient patient at each interaction.  - Keep room lights on and blinds open during day (8am-8pm), low lights 8pm-8am.  - Minimize interruptions of sleep at night (consolidate vitals, nursing assessments, medications).  - Avoid sedating medications as able.    - Vascular Neurology recommended repeat brain MRI in 2-3 months.      LISA/ATN requiring hemodialysis  Anion gap metabolic acidosis due to above  CKD stage 3a  Hypoalbuminemia  Anasarca  Baseline creatinine between 1.1-1.4 with GFR in the 40-50's.    Patient had been developed fluid overload and was treated with lasix drip with good output but developed anephric rising creatinine, BUN and developed worsening acidosis.  Due to development of LISA-D Nephrology was consulted and tunnel dialysis catheter placed 10/30 and has been undergoing HD Mon, Wed, Fri (last session was today, 11/3/2023).     Troponin were trended and minimally elevated.  ECHO completed and without any abnormalities (LV normal size and function with EF of 65-70%.  RV normal size, function and structure.  No valve disease).  - Nephrology consultation appreciated/continued  - Continue dialysis  - Avoid nephrotic medications as able.    - Hold PTA lisinopril 40 mg/d    Aspiration/ventilator associated PNA (Enterobacter, stenotrophomonas and E. Coli)  Acute hypoxic respiratory failure (Improving)  Leukocytosis due to  above  Extubated 11/2. Patient has also developed aspiration/ventilator associated pneumonia with sputum Cx growing Enterobacter, stenotrophomonas and E. coli and was initially started on Zoysn and switched to Meropenem on 11/1/2023.  Currently continued on Meropenem and awaiting final cultures. Procal 3. Sputum cultures growing Enterobacter, E coli, Stenotrophomonas.   O2 requirements down to 1L on 11/6  - Continue Levofloxacin, likely 14 day course ending 11/14  - Follow sputum cultures  - Duonebs scheduled  - Wean o2 as able  - RCAT consult requested  - Encourage use of IS/Flutter valve.    - Off IMC today    Dysphagia  Moderate malnutrition  Failed SLP assessment. Bedside feeding tube placement coiling due to hiatal hernia. Note improvement in dysphagia on 11/5, upgraded to pureed diet. Discussed with SLP, hold off tube feed placement while patient's dysphagia is improving, consider if improvement stalls or oral intake remains below target.  - No NG tube placement today  - Continue SLP assessments  - Transition meds to oral    Scoliosis  Lumbar radiculopathy  S/p T11 to pelvis robotic decompression and fusion  POD# 9.   - Neurosurgery is managing.   - DVT ppx  - Pain control  - Chest PT  - Neuro checks q4h   - PT/OT    HTN  *Currently PTA medications have not been resumed (diltiazem  mg BID and lisinopril 40 mg/).  - Diltiazem 60 mg every 6 hours per FT and hydralazine 10 mg every 8 hours per FT ordered and will be started tomorrow 11/4.    - Monitor closely.      HLP: Hold PTA pravastatin 40 mg/d.      Type 2 DM, controlled  Stress induced hyperglycemia  - High dose insulin sliding scale ordered.  - Sliding scale insulin  - Hypoglycemic protocol in place.      Hypothyroidism: PTA synthroid pending feeding tube placement    GERD  Sliding hiatal hernia  - PTA PPI pending feeding tube placement    OA  Chronic right shoulder pain/rotator cuff arthropathy  Chronic Pain  - Pain/analgesic management per  "Neurosurgery.      Obesity   Suspected ANAYELI  Body mass index is 37.64 kg/m .  Increase in all-cause morbidity and mortality.   - Follow up with PCP regarding ongoing management.    - Monitor O2 saturations.    - Recommend outpatient sleep study.      Overactive bladder: Hold PTA Ditropan XL 10 mg/d.      Migraines: Pain/analgesic management per Neurosurgery.      Asymptomatic carotid artery stenosis.    Clinically Significant Risk Factors             # Anion Gap Metabolic Acidosis: Highest Anion Gap = 21 mmol/L in last 2 days, will monitor and treat as appropriate  # Hypoalbuminemia: Lowest albumin = 2.5 g/dL at 11/1/2023  4:19 AM, will monitor as appropriate     # Hypertension: Noted on problem list        # Obesity: Estimated body mass index is 37.02 kg/m  as calculated from the following:    Height as of this encounter: 1.651 m (5' 5\").    Weight as of this encounter: 100.9 kg (222 lb 7.1 oz).   # Moderate Malnutrition: based on nutrition assessment      # Financial/Environmental Concerns: none          PT/OT: yes  Diet: Adult Formula Drip Feeding: Continuous Novasource Renal; Nasogastric tube; Goal Rate: 40; mL/hr; Once new FT placed and OK to use, resume TF at 20 mL/hr x 12 hours and then advance to goal.  Hold TF 1 hour before and 1 hour after Synthroid adminis...  Combination Diet Pureed Diet (level 4); Mildly Thick (level 2) (1:1 assist/supervision, by tsp only, cue hard swallows per tsp, sit at 90 degrees, crush meds with puree, ok for single ice chips, hold po if respiratory status declines)    DVT Prophylaxis: Heparin SQ  Spicer Catheter: Not present  Lines: PRESENT      CVC Double Lumen Right Internal jugular Non - valved (open ended);Tunneled-Site Assessment: WDL      Cardiac Monitoring: ACTIVE order. Indication: hypoxia, pneumonia  Code Status: Full Code    Disposition: Anticipated discharge pending improvement in oral intake, possibly to AZEB Anguiano MD  Hospitalist [facility]  Securely message with [employer] (more info)  Text page via [email or web address] Paging/Directory     Data reviewed today: I reviewed all new labs and imaging results over the last 24 hours.    Physical Exam   Temp: 98.7  F (37.1  C) Temp src: Axillary BP: (!) 141/85 Pulse: 80   Resp: 21 SpO2: 94 % O2 Device: Nasal cannula Oxygen Delivery: 1 LPM  Vitals:    11/02/23 0525 11/03/23 0600 11/04/23 0000   Weight: 101.2 kg (223 lb 1.7 oz) 102.6 kg (226 lb 3.1 oz) 100.9 kg (222 lb 7.1 oz)     Vital Signs with Ranges  Temp:  [97.9  F (36.6  C)-98.7  F (37.1  C)] 98.7  F (37.1  C)  Pulse:  [75-85] 80  Resp:  [17-29] 21  BP: (108-169)/(54-85) 141/85  SpO2:  [91 %-100 %] 94 %  I/O last 3 completed shifts:  In: 483 [P.O.:450; I.V.:33]  Out: 200 [Urine:200]  O2 requirements: yes    Constitutional: Elderly female appears ill, intermittent coughing  HEENT: Eyes nonicteric, oral mucosa moist  Cardiovascular: RRR, normal S1/2, no m/r/g  Respiratory: Very coarse with some mild wheezing  Vascular: Trace BLE pitting edema  GI: Normoactive bowel sounds, appears nontender  Skin/Integumen: No rashes  Neuro/Psych: Confused and oriented to self and year, moves all extremities    Medications        - MEDICATION INSTRUCTIONS for Dialysis Patients -   Does not apply See Admin Instructions    artificial saliva  2 spray Swish & Spit 4x Daily    B and C vitamin Complex with folic acid  5 mL Per Feeding Tube Daily    diltiazem  60 mg Oral or Feeding Tube Q6H NATACHA    epoetin stephie-epbx  4,000 Units Intravenous Once in dialysis/CRRT    sodium chloride (PF) 0.9%  10 mL Intracatheter Once in dialysis/CRRT    Followed by    heparin  1.3-2.6 mL Intracatheter Once in dialysis/CRRT    sodium chloride (PF) 0.9%  10 mL Intracatheter Once in dialysis/CRRT    Followed by    heparin  1.3-2.6 mL Intracatheter Once in dialysis/CRRT    heparin ANTICOAGULANT  5,000 Units Subcutaneous Q8H    hydrALAZINE  10 mg Oral or Feeding Tube Q8H    insulin aspart  1-7  Units Subcutaneous TID AC    insulin aspart  1-5 Units Subcutaneous At Bedtime    levofloxacin  500 mg Intravenous Q48H    levothyroxine  100 mcg Oral or NG Tube QAM AC    - MEDICATION INSTRUCTIONS -   Does not apply Once    nystatin   Topical BID    pantoprazole  40 mg Per Feeding Tube QAM AC    polyethylene glycol  17 g Oral or NG Tube Daily    protein modular  1 packet Per Feeding Tube Daily    senna-docusate  1 tablet Oral or NG Tube BID    sodium chloride (PF)  10 mL Intracatheter Q8H    sodium chloride (PF)  3 mL Intracatheter Q8H    sodium chloride (PF)  3 mL Intracatheter Q8H    sodium chloride 0.9%  250 mL Intravenous Once in dialysis/CRRT    sodium chloride 0.9%  300 mL Hemodialysis Machine Once    traZODone  25 mg Oral At Bedtime       Data   Recent Labs   Lab 11/06/23  0727 11/06/23  0557 11/06/23  0403 11/05/23  0759 11/05/23  0514 11/04/23  0804 11/04/23  0409 11/03/23  0502 11/03/23  0458 11/02/23  0800 11/02/23  0521   WBC  --   --   --   --   --   --  16.2*  --  18.5*  --  16.7*   HGB  --   --   --   --   --   --  8.8*  --  8.1*  --  8.1*   MCV  --   --   --   --   --   --  90  --  90  --  87   PLT  --   --   --   --   --   --  187  --  183  --  156   NA  --  140  --   --  140  --  140  --  140  --  139   POTASSIUM  --  4.8  --   --  4.4  --  4.4   < > 4.1  --  4.1   CHLORIDE  --  98  --   --  97*  --  97*  --  96*  --  98   CO2  --  23  --   --  22  --  22  --  22  --  22   BUN  --  89.6*  --   --  74.3*  --  55.4*  --  90.1*  --  75.6*   CR  --  7.18*  --   --  6.15*  --  4.50*   < > 6.37*  --  4.88*   ANIONGAP  --  19*  --   --  21*  --  21*  --  22*  --  19*   TERESA  --  8.1*  --   --  8.3*  --  8.1*  --  7.9*  --  7.8*   * 98 100*   < > 105*   < > 88   < > 95   < > 108*  115*   ALBUMIN  --  2.9*  --   --  3.0*  --  2.9*  --  2.7*  --  2.9*   PROTTOTAL  --  5.8*  --   --  5.6*  --  5.7*  --  5.1*  --  5.0*   BILITOTAL  --  0.5  --   --  0.5  --  0.6  --  0.5  --  0.6   ALKPHOS  --  126*   --   --  131*  --  148*  --  141*  --  121*   ALT  --  26  --   --  28  --  36  --  38  --  36   AST  --  64*  --   --  72*  --  113*  --  140*  --  101*    < > = values in this interval not displayed.       Imaging:   No results found for this or any previous visit (from the past 24 hour(s)).

## 2023-11-06 NOTE — PROGRESS NOTES
Renal Medicine Progress Note            Assessment/Plan:     Assessment: Mya Hui is a 83-year-old woman with PMH DM2, hypothyroidism, HLD, HTN admitted 10/25/2023 due to hemorrhagic shock after lumbar spinal fusion.  Course complicated by LISA and encephalopathy.     LISA-D  CKD IIIA  Anasarca  Hypoalbuminemia  Baseline creatinine 1.1-1.4, EGFR 45-50.  No history of appreciable proteinuria.  Worsening creatinine in setting of hemorrhagic shock, most currently has ATN as a result.  Has developed fluid overload, Lasix drip started with good output, however renal function continues to have anephric rising creatinine daily despite this.  She has a rising BUN and worsening acidosis, reasonable to start dialysis to improve mentation and improve fluid status.  -Tunneled dialysis catheter placed by IR on 10/30      Hemorrhagic shock, resolved  S/p lumbar fusion  Shock liver, improved  Underwent lumbar fusion on 10/25, had 2 L blood loss during surgery.  Developed hemorrhagic shock thereafter.  Multiple transfusions and pressors, currently not requiring pressors.  ELY drains still in place.     Enterobacter PNA   Sputum cx with enterobacter. Zosyn switched to meropenem 11/1.        Plan/Recs:  1) dialysis today, will continue Monday Wednesday Friday schedule while monitoring for renal recovery.  2) continue daily BMPs, avoidance of nephrotoxins.  3) plan for transition to outpatient dialysis upon discharge.    Alfredo Spain DO  [facility]  Office: [phone]  Cell: [phone]        Interval History:     Patient sitting in bedside chair, having a cough which she denies is new.  Does have a slightly improved appetite but has no other concerns or complaints to report.  Denies having any changes regarding urine output.  No urine output recorded for many days, did have 200 cc recorded since midnight.             Medications and Allergies:      - MEDICATION INSTRUCTIONS for Dialysis Patients -   Does not  "apply See Admin Instructions    artificial saliva  2 spray Swish & Spit 4x Daily    B and C vitamin Complex with folic acid  5 mL Per Feeding Tube Daily    diltiazem  60 mg Oral or Feeding Tube Q6H NATACHA    epoetin stephie-epbx  4,000 Units Intravenous Once in dialysis/CRRT    sodium chloride (PF) 0.9%  10 mL Intracatheter Once in dialysis/CRRT    Followed by    heparin  1.3-2.6 mL Intracatheter Once in dialysis/CRRT    sodium chloride (PF) 0.9%  10 mL Intracatheter Once in dialysis/CRRT    Followed by    heparin  1.3-2.6 mL Intracatheter Once in dialysis/CRRT    heparin ANTICOAGULANT  5,000 Units Subcutaneous Q8H    hydrALAZINE  10 mg Oral or Feeding Tube Q8H    insulin aspart  1-12 Units Subcutaneous Q4H    ipratropium - albuterol 0.5 mg/2.5 mg/3 mL  3 mL Nebulization Q4H    levofloxacin  500 mg Intravenous Q48H    levothyroxine  100 mcg Oral or NG Tube QAM AC    - MEDICATION INSTRUCTIONS -   Does not apply Once    nystatin   Topical BID    pantoprazole  40 mg Per Feeding Tube QAM AC    Or    pantoprazole  40 mg Intravenous QAM AC    polyethylene glycol  17 g Oral or NG Tube Daily    protein modular  1 packet Per Feeding Tube Daily    senna-docusate  1 tablet Oral or NG Tube BID    sodium chloride (PF)  10 mL Intracatheter Q8H    sodium chloride (PF)  3 mL Intracatheter Q8H    sodium chloride (PF)  3 mL Intracatheter Q8H    sodium chloride 0.9%  250 mL Intravenous Once in dialysis/CRRT    sodium chloride 0.9%  300 mL Hemodialysis Machine Once    traZODone  25 mg Oral At Bedtime        Allergies   Allergen Reactions    Fentanyl Nausea and Vomiting     VERY sensitive to most narcotics if not all.    Morphine And Related Nausea            Physical Exam:   Vitals were reviewed  BP (!) 141/85   Pulse 80   Temp 98.7  F (37.1  C) (Axillary)   Resp 21   Ht 1.651 m (5' 5\")   Wt 100.9 kg (222 lb 7.1 oz)   LMP  (LMP Unknown)   SpO2 94%   BMI 37.02 kg/m      Wt Readings from Last 3 Encounters:   11/04/23 100.9 kg (222 lb " 7.1 oz)   10/19/23 95 kg (209 lb 8 oz)   09/28/23 97.1 kg (214 lb)       Intake/Output Summary (Last 24 hours) at 11/6/2023 1048  Last data filed at 11/6/2023 0800  Gross per 24 hour   Intake 553 ml   Output 200 ml   Net 353 ml       GENERAL APPEARANCE: NAD  HEENT: Dry mucous membranes  RESP: Right-sided lung coarse rhonchi  CV: RRR, no murmur  ABDOMEN: soft, nontender  EXTREMITIES/SKIN: 2+ pitting edema in upper extremities, 2+ in lower extremities.  NEURO: Awake and alert, somewhat confused, answers questions appropriately              Data:     BMP  Recent Labs   Lab 11/06/23  0727 11/06/23  0557 11/06/23  0403 11/06/23  0348 11/05/23  0759 11/05/23  0514 11/04/23  0804 11/04/23  0409 11/03/23  1712 11/03/23  1645 11/03/23  0502 11/03/23  0458   NA  --  140  --   --   --  140  --  140  --   --   --  140   POTASSIUM  --  4.8  --   --   --  4.4  --  4.4  --  4.0  --  4.1   CHLORIDE  --  98  --   --   --  97*  --  97*  --   --   --  96*   TERESA  --  8.1*  --   --   --  8.3*  --  8.1*  --   --   --  7.9*   CO2  --  23  --   --   --  22  --  22  --   --   --  22   BUN  --  89.6*  --   --   --  74.3*  --  55.4*  --   --   --  90.1*   CR  --  7.18*  --   --   --  6.15*  --  4.50*  --  2.66*  --  6.37*   * 98 100* 92   < > 105*   < > 88   < >  --    < > 95    < > = values in this interval not displayed.     CBC  Recent Labs   Lab 11/04/23  0409 11/03/23  0458 11/02/23  0521 11/01/23  0419   WBC 16.2* 18.5* 16.7* 10.9   HGB 8.8* 8.1* 8.1* 8.1*   HCT 27.7* 25.4* 24.5* 24.4*   MCV 90 90 87 88    183 156 117*     Lab Results   Component Value Date    AST 64 (H) 11/06/2023    ALT 26 11/06/2023    ALKPHOS 126 (H) 11/06/2023    BILITOTAL 0.5 11/06/2023    CHARLETTE 42 10/31/2023     Lab Results   Component Value Date    INR 1.57 (H) 10/27/2023       Attestation:  I have reviewed today's vital signs, notes, medications, labs and imaging.    DO [facility] - Nephrology  Office: [phone]  Cell:  [phone]

## 2023-11-06 NOTE — PROVIDER NOTIFICATION
MD Notification    Notified Person: MD    Notified Person Name: Fito Kerr MD    Notification Date/Time: 0643 11/6/23    Notification Interaction: Vocera    Purpose of Notification: Patient just had 14beats of SVT with rate at 188bpm. Was being repositioned when it happened. Asymptomatic. Back to SR now.     Orders Received:    Comments:

## 2023-11-06 NOTE — PROGRESS NOTES
CLINICAL NUTRITION SERVICES - REASSESSMENT NOTE    RECOMMENDATIONS FOR MD/PROVIDER TO ORDER:   Recommend re-initiation of nutrition support until patient able to consume adequate nutrition. Patient on pureed, mildly thick diet however only consuming applesauce with meds. TF orders are still in.   Malnutrition:   % Weight Loss:  None noted  % Intake:  </= 50% for >/= 5 days (severe malnutrition) (10/28)  Subcutaneous Fat Loss:  None observed (10/28)  Muscle Loss:  None observed (10/28)  Fluid Retention:  2-3+ Mild-Moderate edema     Malnutrition Diagnosis: Moderate malnutrition  In Context of:  Acute illness or injury      EVALUATION OF PROGRESS TOWARD GOALS   Diet:  Pureed Diet (level 4); Mildly Thick (level 2)    Nutrition Support:      - 11/2: patient extubated, OG removed. NG not able to be placed 11/3 d/t hiatal hernia. Plan was for IR to place FT 11/3 and TF be re-started. Unable to be placed in IR d/t other cases. Feeding tube still not placed.    - 10/30: TF regimen changed to Novasource Renal @ 40 mL/hr x 22 hours    - 10/28 started on TF: Promote @ 70 mL/hr x 22 hours + 1 pkt Prosource = 1840 kcal (17 kcal/kg), 100 g protein (1.8 g/kg), 161 g CHO, 0 g fiber, 631 mL H2O   Nephronex daily      Intake/Tolerance:    - On pureed, mildly thick diet however no meals ordered. Only consuming applesauce per nursing flowsheets.    UPDATED ASSESSED NUTRITION NEEDS:  Dosing Weight:  67.9 kg (adjusted based off lowest wt of 101.2 kg and IBW 56.8 kg)  Estimated Energy Needs: 8717-3956 kcals (25-30 Kcal/Kg)  Justification: maintenance  Estimated Protein Needs:  grams protein (1.2-1.8 g pro/Kg)  Justification: dialysis    NEW FINDINGS:   Labs: BUN 89.6 (H), Cr 7.18 (H), GFR 5 (L), Phos 10.5 (H), alk phos 126 (H), AST 64 (H)    Meds: nephronex, high sliding scale insulin, levothyroxine, miralax, senokot    Weight: wt trending down however on HD so suspect most d/t fluid loss  11/04/23 0000 100.9 kg (222 lb 7.1 oz) Bed  scale   11/03/23 0600 102.6 kg (226 lb 3.1 oz) Bed scale   11/02/23 0525 101.2 kg (223 lb 1.7 oz) Bed scale   11/01/23 0400 105.9 kg (233 lb 7.5 oz) Bed scale   10/31/23 0400 104.1 kg (229 lb 6.4 oz) Bed scale     Previous Goals:   Patient to receive nutrition orally or via tubefeeding within 24-48 hours.   Evaluation: Not met    Previous Nutrition Diagnosis:   Inadequate protein-energy intake related to NPO status as evidenced by inconsistent administration of TF regimen d/t procedures, OG tube removal, and </=50% of intake for >/= 5 days.   Evaluation: No change    CURRENT NUTRITION DIAGNOSIS  Inadequate protein-energy intake related to NPO status as evidenced by inconsistent administration of TF regimen d/t procedures, OG tube removal, and </=50% of intake for >/= 5 days.     INTERVENTIONS  Recommendations / Nutrition Prescription  See above    Implementation  EN Composition and EN Schedule - recommend re-initiation    Goals  Re-initiation of EN within 24-48 hours vs increase in intakes >50% BID-TID meals    MONITORING AND EVALUATION:  Progress towards goals will be monitored and evaluated per protocol and Practice Guidelines    Alison Flores RD, LD  Clinical Dietitian - [facility]

## 2023-11-06 NOTE — PLAN OF CARE
Orientation: Alert and oriented to self. Waxes and wanes with orientation.  Vitals/Pain: VSS ex, hypertensive at times. On 2L O2  via NC. Denies pain.  Diet: Pureed diet, mildly thickened liquids; Meds crushed with apple sauce. Total feed. Good appetite.  Neuro: Pupils unequal, L slightly >R but reactive; baseline per patient. RUE weakness, unchanged. [place].  Respiratory: Lung sounds diminished. Not following commands for IS. Frequent congested cough, nonproductive.  Tele: SR  CMS: Denies numbness and tigling.   GI/: On HD, still making urine. Loose BM-. Incontinent of bowel and bladder. Denies nausea.  Lines/ Drains: R chest tunneled cath. L midline and R PIV, SL. Midline flushes with well with good blood return.   Skin/Wounds: Mid to lower back incision with staples, CHIKI. L Lower back and R forearm skin tear, Mepilex CDI. Scattered bruising. Generalized edema.  Mobility: Not OOB. Turn/ repo Q2.Needs TLSO brace when OOB.  Labs: BG Q4.  Consults: Hospitalist, Neurosurg, PT, OT, ST following  Plan: Dialysis today. Re-evaluate if still needs feeding tube.

## 2023-11-06 NOTE — PLAN OF CARE
Goal Outcome Evaluation:    Orientations: A/O x 2. Disoriented to place, situation. L pupil slightly larger than R. Pupils unchanged, baseline per pt. Orientation seemed to improve throughout the day.  Q4h neuros.  Vitals/Pain: VSS on 2L oxymizer cannula. Hypertensive in the 160s systolic. C/o intermittent discomfort in back. Repositioned for comfort. Denies nausea.  SLP advanced diet to pureed diet, mildly thick liquids. Tolerating well.   Tele: SR  Lines/Drains: R chest tunneled cath, LUE midline, R PIV SL. Int, abx.   Skin/Wounds: LL back and R forearm skin tear. Mepilex in place, CDI. Mid back incision with staples, CHIKI. Scattered bruising.   GI/: Low UOP, hemodialysis. X1 small smear BM. Incontinent  Labs: Abnormal/Trends, Electrolyte Replacement- BG q4h.   Ambulation/Assist: T/R q2h  Sleep Quality: good  Plan: Dialysis run tomorrow 11/6, potentially hold off on IR feeding tube placement given diet was advanced by SLP.

## 2023-11-06 NOTE — PLAN OF CARE
Goal: Plan of Care Review  Outcome: Not Progressing   Goal Outcome Evaluation:    Recommend re-initiation of nutrition support until patient able to consume adequate nutrition. Patient on pureed, mildly thick diet however only consuming applesauce with meds. TF orders are still in.     Alison Flores RD, LD  Clinical Dietitian - [facility]

## 2023-11-07 ENCOUNTER — APPOINTMENT (OUTPATIENT)
Dept: GENERAL RADIOLOGY | Facility: CLINIC | Age: 83
DRG: 453 | End: 2023-11-07
Attending: INTERNAL MEDICINE
Payer: COMMERCIAL

## 2023-11-07 ENCOUNTER — APPOINTMENT (OUTPATIENT)
Dept: PHYSICAL THERAPY | Facility: CLINIC | Age: 83
DRG: 453 | End: 2023-11-07
Attending: NEUROLOGICAL SURGERY
Payer: COMMERCIAL

## 2023-11-07 ENCOUNTER — APPOINTMENT (OUTPATIENT)
Dept: SPEECH THERAPY | Facility: CLINIC | Age: 83
DRG: 453 | End: 2023-11-07
Attending: NEUROLOGICAL SURGERY
Payer: COMMERCIAL

## 2023-11-07 ENCOUNTER — APPOINTMENT (OUTPATIENT)
Dept: OCCUPATIONAL THERAPY | Facility: CLINIC | Age: 83
DRG: 453 | End: 2023-11-07
Attending: NEUROLOGICAL SURGERY
Payer: COMMERCIAL

## 2023-11-07 LAB
ALBUMIN SERPL BCG-MCNC: 3 G/DL (ref 3.5–5.2)
ALP SERPL-CCNC: 122 U/L (ref 35–104)
ALT SERPL W P-5'-P-CCNC: 23 U/L (ref 0–50)
ANION GAP SERPL CALCULATED.3IONS-SCNC: 16 MMOL/L (ref 7–15)
AST SERPL W P-5'-P-CCNC: 49 U/L (ref 0–45)
BILIRUB DIRECT SERPL-MCNC: 0.21 MG/DL (ref 0–0.3)
BILIRUB SERPL-MCNC: 0.4 MG/DL
BUN SERPL-MCNC: 45.2 MG/DL (ref 8–23)
CALCIUM SERPL-MCNC: 7.9 MG/DL (ref 8.8–10.2)
CHLORIDE SERPL-SCNC: 97 MMOL/L (ref 98–107)
CREAT SERPL-MCNC: 4.64 MG/DL (ref 0.51–0.95)
DEPRECATED HCO3 PLAS-SCNC: 26 MMOL/L (ref 22–29)
EGFRCR SERPLBLD CKD-EPI 2021: 9 ML/MIN/1.73M2
GLUCOSE BLDC GLUCOMTR-MCNC: 106 MG/DL (ref 70–99)
GLUCOSE BLDC GLUCOMTR-MCNC: 116 MG/DL (ref 70–99)
GLUCOSE BLDC GLUCOMTR-MCNC: 98 MG/DL (ref 70–99)
GLUCOSE BLDC GLUCOMTR-MCNC: 99 MG/DL (ref 70–99)
GLUCOSE SERPL-MCNC: 106 MG/DL (ref 70–99)
MAGNESIUM SERPL-MCNC: 1.7 MG/DL (ref 1.7–2.3)
PHOSPHATE SERPL-MCNC: 6.1 MG/DL (ref 2.5–4.5)
POTASSIUM SERPL-SCNC: 3.9 MMOL/L (ref 3.4–5.3)
PROT SERPL-MCNC: 5.7 G/DL (ref 6.4–8.3)
SODIUM SERPL-SCNC: 139 MMOL/L (ref 135–145)

## 2023-11-07 PROCEDURE — 97530 THERAPEUTIC ACTIVITIES: CPT | Mod: GP

## 2023-11-07 PROCEDURE — 92526 ORAL FUNCTION THERAPY: CPT | Mod: GN | Performed by: SPEECH-LANGUAGE PATHOLOGIST

## 2023-11-07 PROCEDURE — 250N000013 HC RX MED GY IP 250 OP 250 PS 637: Performed by: INTERNAL MEDICINE

## 2023-11-07 PROCEDURE — 250N000013 HC RX MED GY IP 250 OP 250 PS 637: Performed by: NEUROLOGICAL SURGERY

## 2023-11-07 PROCEDURE — 97535 SELF CARE MNGMENT TRAINING: CPT | Mod: GO

## 2023-11-07 PROCEDURE — 120N000001 HC R&B MED SURG/OB

## 2023-11-07 PROCEDURE — 250N000011 HC RX IP 250 OP 636: Mod: JZ | Performed by: NURSE PRACTITIONER

## 2023-11-07 PROCEDURE — 80053 COMPREHEN METABOLIC PANEL: CPT | Performed by: INTERNAL MEDICINE

## 2023-11-07 PROCEDURE — 250N000013 HC RX MED GY IP 250 OP 250 PS 637: Performed by: NURSE PRACTITIONER

## 2023-11-07 PROCEDURE — 99207 PR CDG-CUT & PASTE-POTENTIAL IMPACT ON LEVEL: CPT | Performed by: INTERNAL MEDICINE

## 2023-11-07 PROCEDURE — 250N000011 HC RX IP 250 OP 636: Performed by: INTERNAL MEDICINE

## 2023-11-07 PROCEDURE — 82248 BILIRUBIN DIRECT: CPT | Performed by: INTERNAL MEDICINE

## 2023-11-07 PROCEDURE — 92611 MOTION FLUOROSCOPY/SWALLOW: CPT | Mod: GN | Performed by: SPEECH-LANGUAGE PATHOLOGIST

## 2023-11-07 PROCEDURE — 99233 SBSQ HOSP IP/OBS HIGH 50: CPT | Performed by: INTERNAL MEDICINE

## 2023-11-07 PROCEDURE — 83735 ASSAY OF MAGNESIUM: CPT | Performed by: INTERNAL MEDICINE

## 2023-11-07 PROCEDURE — 250N000011 HC RX IP 250 OP 636: Mod: JZ | Performed by: INTERNAL MEDICINE

## 2023-11-07 PROCEDURE — 84100 ASSAY OF PHOSPHORUS: CPT | Performed by: INTERNAL MEDICINE

## 2023-11-07 PROCEDURE — 999N000040 HC STATISTIC CONSULT NO CHARGE VASC ACCESS

## 2023-11-07 PROCEDURE — 74230 X-RAY XM SWLNG FUNCJ C+: CPT

## 2023-11-07 PROCEDURE — 99232 SBSQ HOSP IP/OBS MODERATE 35: CPT | Performed by: INTERNAL MEDICINE

## 2023-11-07 RX ORDER — BARIUM SULFATE 400 MG/ML
SUSPENSION ORAL ONCE
Status: COMPLETED | OUTPATIENT
Start: 2023-11-07 | End: 2023-11-07

## 2023-11-07 RX ADMIN — HEPARIN SODIUM 5000 UNITS: 5000 INJECTION, SOLUTION INTRAVENOUS; SUBCUTANEOUS at 08:42

## 2023-11-07 RX ADMIN — Medication 2 SPRAY: at 17:44

## 2023-11-07 RX ADMIN — HEPARIN SODIUM 5000 UNITS: 5000 INJECTION, SOLUTION INTRAVENOUS; SUBCUTANEOUS at 00:18

## 2023-11-07 RX ADMIN — BARIUM SULFATE 15 ML: 400 SUSPENSION ORAL at 14:38

## 2023-11-07 RX ADMIN — HYDRALAZINE HYDROCHLORIDE 10 MG: 10 TABLET ORAL at 05:47

## 2023-11-07 RX ADMIN — MICONAZOLE NITRATE: 2 POWDER TOPICAL at 08:46

## 2023-11-07 RX ADMIN — DILTIAZEM HYDROCHLORIDE 60 MG: 60 TABLET, FILM COATED ORAL at 21:11

## 2023-11-07 RX ADMIN — SENNOSIDES AND DOCUSATE SODIUM 1 TABLET: 8.6; 5 TABLET ORAL at 21:11

## 2023-11-07 RX ADMIN — HEPARIN SODIUM 5000 UNITS: 5000 INJECTION, SOLUTION INTRAVENOUS; SUBCUTANEOUS at 17:44

## 2023-11-07 RX ADMIN — ONDANSETRON 4 MG: 2 INJECTION INTRAMUSCULAR; INTRAVENOUS at 09:13

## 2023-11-07 RX ADMIN — BARIUM SULFATE 15 ML: 400 SUSPENSION ORAL at 14:31

## 2023-11-07 RX ADMIN — DILTIAZEM HYDROCHLORIDE 60 MG: 60 TABLET, FILM COATED ORAL at 17:47

## 2023-11-07 RX ADMIN — LEVOFLOXACIN 500 MG: 5 INJECTION, SOLUTION INTRAVENOUS at 15:57

## 2023-11-07 RX ADMIN — Medication 2 SPRAY: at 21:31

## 2023-11-07 RX ADMIN — HYDRALAZINE HYDROCHLORIDE 10 MG: 10 TABLET ORAL at 21:11

## 2023-11-07 RX ADMIN — LEVOTHYROXINE SODIUM 100 MCG: 100 TABLET ORAL at 05:47

## 2023-11-07 RX ADMIN — HYDROXYZINE HYDROCHLORIDE 10 MG: 10 TABLET ORAL at 00:18

## 2023-11-07 RX ADMIN — TRAZODONE HYDROCHLORIDE 25 MG: 50 TABLET ORAL at 21:10

## 2023-11-07 RX ADMIN — DILTIAZEM HYDROCHLORIDE 60 MG: 60 TABLET, FILM COATED ORAL at 05:47

## 2023-11-07 RX ADMIN — HYDRALAZINE HYDROCHLORIDE 10 MG: 10 TABLET ORAL at 13:25

## 2023-11-07 RX ADMIN — DILTIAZEM HYDROCHLORIDE 60 MG: 60 TABLET, FILM COATED ORAL at 13:25

## 2023-11-07 RX ADMIN — OXYCODONE HYDROCHLORIDE 5 MG: 5 TABLET ORAL at 02:46

## 2023-11-07 RX ADMIN — NYSTATIN: 100000 CREAM TOPICAL at 21:32

## 2023-11-07 ASSESSMENT — ACTIVITIES OF DAILY LIVING (ADL)
ADLS_ACUITY_SCORE: 42
ADLS_ACUITY_SCORE: 44
ADLS_ACUITY_SCORE: 42
ADLS_ACUITY_SCORE: 42
ADLS_ACUITY_SCORE: 44
ADLS_ACUITY_SCORE: 42
ADLS_ACUITY_SCORE: 44
ADLS_ACUITY_SCORE: 42

## 2023-11-07 NOTE — PROGRESS NOTES
SLP VFSS Report 11/07/23 2714   Appointment Info   Signing Clinician's Name / Credentials (SLP) Mansi Still MA [facility] SLP   General Information   Onset of Illness/Injury or Date of Surgery 10/25/23   Referring Physician Dr. Anguiano   Patient/Family Therapy Goal Statement (SLP) Patient wants water.   Pertinent History of Current Problem Patient admitted for lumbar fusion surgery and led to hemorrhagic shock, LISA, encephalopathy.  Patient was extubated yesterday 1025, now on 4 L oxymask.   General Observations Pt was lethargic initially for the study; but pt did maintain alertness with min to mod cues.  Mod-max cues were needed for pt to follow directions and swallow at times.  Pt distracted.  Cough x 1 during the study unrelated to penetration/aspiration, although, pharyngeal residue was present.   General Swallowing Observations   Swallowing Evaluation Videofluoroscopic swallow study (VFSS)   VFSS Evaluation   Radiologist Dr. Javed   Views Taken left lateral  (A/P not completed due to decreased pt to tolerate additional trials/poor attention/ability to follow commands.)   Physical Location of Procedure [facility]   VFSS Eval: Thin Liquid Texture Trial   Mode of Presentation, Thin Liquid spoon;cup;straw   Order of Presentation 8, 9, 10, 11, 12   Preparatory Phase poor bolus control   Oral Phase, Thin Liquid residue in oral cavity;premature pharyngeal entry   Bolus Location When Swallow Triggered pyriforms   Pharyngeal Phase, Thin Liquid impaired hyolaryngeal excursion;impaired epiglottic movement;impaired tongue base retraction;impaired pharyngoesophageal segment opening  (decreased peristalsis)   Rosenbek's Penetration Aspiration Scale: Thin Liquid Trial Results 8 - contrast passes glottis, visible subglottic residue remains, absent patient response (aspiration)  (per recording review additional silent aspiration noted; during live trial, moderate penentration with residual noted)   Diagnostic Statement Mod-max  assist/cues needed to swallow, tight UES, mild-mod to mod residue, penetration with reisdual of thin by cup and straw; upon recording review, silent aspiration from small amount of trial also noted   VFSS Eval: Mildly Thick Liquids   Mode of Presentation spoon   Order of Presentation 1, 2, 3   Preparatory Phase poor bolus control   Oral Phase impaired AP movement;residue in oral cavity;premature pharyngeal entry   Bolus Location When Swallow Triggered pyriforms   Pharyngeal Phase impaired hyolaryngel excursion;impaired epiglottic movement;impaired tongue base retraction;impaired pharyngoesophageal segment opening  (decreased peristalsis)   Rosenbek's Penetration Aspiration Scale 3 - contrast remains above the vocal cords, visible residue remains (penetration)   Diagnostic Statement Mod-max assist/cues needed to swallow, piecemeal swallows, tight UES, mild-mod residue,  mod-deep penetration with residual, slight chin tuck did not eliminate penetration with residual, cued cough off video did not clear penetrated material fully   VFSS Eval: Moderately Thick Liquids   Mode of Presentation spoon   Order of Presentation 4, 5   Preparatory Phase poor bolus control;prolonged bolus preparation   Oral Phase impaired AP movement;premature pharyngeal entry;residue in oral cavity   Bolus Location When Swallow Triggered posterior angle of ramus   Pharyngeal Phase impaired hyolaryngel excursion;impaired epiglottic movement;impaired tongue base retraction;impaired pharyngoesophageal segment opening  (decreased peristalsis)   Rosenbek's Penetration Aspiration Scale 1 - no aspiration, contrast does not enter airway   Diagnostic Statement Mod-max assist/cues needed to swallow, piecemeal swallows, tight UES, mild-mod residue   VFSS Evaluation: Puree Solid Texture Trial   Mode of Presentation, Puree spoon   Order of Presentation 6. 7   Preparatory Phase prolonged bolus preparation;poor bolus control   Oral Phase, Puree impaired AP  movement;residue in oral cavity;premature pharyngeal entry   Bolus Location When Swallow Triggered posterior angle of ramus   Pharyngeal Phase, Puree impaired hyolaryngel excursion;impaired tongue base retraction;impaired epiglottic movement;impaired pharyngoesophageal segment opening  (decreased peristalsis)   Rosenbek's Penetration Aspiration Scale: Puree Food Trial Results 1 - no aspiration, contrast does not enter airway   Diagnostic Statement Mod-max assist/cues needed to swallow, piecemeal swallows, tight UES, mod residue, extra swallow off video did not clear, alternating to thin liquids did not clear   Esophageal Phase of Swallow   Esophageal comments Tight UES   Swallowing Recommendations   Diet Consistency Recommendations clear liquid diet;moderately thick liquids/liquidized (level 3)   Supervision Level for Intake 1:1 supervision needed  (only when alert, cue pt to swallow, ok for single ice chips)   Mode of Delivery Recommendations no straws;liquids via spoon only;bolus size, small;slow rate of intake   Swallowing Maneuver Recommendations extra swallow;effortful (hard) swallow   Monitoring/Assistance Required (Eating/Swallowing) monitor for cough or change in vocal quality with intake   Recommended Feeding/Eating Techniques (Swallow Eval) minimize distractions during oral intake;provide assist with feeding;maintain upright posture during/after eating for 30 minutes   Clinical Impression   Criteria for Skilled Therapeutic Interventions Met (SLP Eval) Yes, treatment indicated   Risks & Benefits of therapy have been explained evaluation/treatment results reviewed;care plan/treatment goals reviewed;risks/benefits reviewed;current/potential barriers reviewed;participants voiced agreement with care plan;participants included;patient   Clinical Impression Comments Mod-severe oral-pharyngeal dysphagia was observed during today's study.  Decreased attention and overall fatigue impacted swallow function and  safety.  Pt also presents with poor oral control, delayed swallows, decreased elevation, base of tongue function, and epiglottic inversion, piecemeal swallows, and tight UES.  Deficits resulted in min-mod to mod pharyngeal residue that is difficult to clear, penetration with residual on mildly thick by tsp, thin by cup, and thin by straw.  Silent aspiration was also noted with thin liquids by straw per recording review.  Recommend a diet downgrade to clear liquid moderately thick liquids by tsp only with 1:1 supervision/assist and direct cues to swallow/use safe swallow strategies.  Ok for single ice chips.  Hold po if aspiration signs are noted/respiratory status declines.  Recommend consideration of alternative nutrition/hydration given diet restrictions and overall fatigue impacting pt's ability to maintain nutrition/hydration with po intake alone.  Plan to continue swallow Tx to assess po tolerance, train strategies, complete exercises, and trial diet upgrades as indicated.  Consider a repeat VFSS in 1-2 weeks prior to liquid upgrades given silent aspiration risk.     SLP Total Evaluation Time   Evaluation, videofluoroscopic eval of swallow function Minutes (53494) 15   SLP Goals   SLP Predicted Duration/Target Date for Goal Attainment 11/13/23   SLP Goals SLP Goal 1   SLP: Safely tolerate diet without signs/symptoms of aspiration Moderately thick liquids;Soft & bite sized diet   SLP: Goal 1 1.  Pt will complete 10-20 reps x 5 oral-pharyngel exercises given mod cues.   Swallowing Dysfunction &/or Oral Function for Feeding   Treatment of Swallowing Dysfunction &/or Oral Function for Feeding Minutes (39920) 10   Symptoms Noted During/After Treatment Fatigue   Treatment Detail/Skilled Intervention Educated provided to pt, RN, MD regarding current findings, recommended mod thick liquids and ice chips with careful use of precautions, and consider TF placement.  Pt will need continued education due to decreased  attention and recall.  Pt required mod-max cues to cough after penetration,   Cough did not clear residual penetration.   SLP Discharge Planning   SLP Plan Assess mod thick liquid tolerance, train strategies, trial puree, exercises   SLP Discharge Recommendation Acute [facility]-Motivated patient will benefit from intensive, interdisciplinary therapy.  Anticipate will be able to tolerate 3 hours of therapy per day   SLP Rationale for DC Rec Significantly below baseline, likely dysphagia and communication needs at baseline   SLP Brief overview of current status  Mod-severe oral-pharyngeal dysphagia was observed during today's study.  Recommend a diet downgrade to clear liquid moderately thick liquids by tsp only with 1:1 supervision/assist and direct cues to swallow/use safe swallow strategies.  Ok for single ice chips.  Hold po if aspiration signs are noted/respiratory status declines.     Total Session Time   Total Session Time (sum of timed and untimed services) 25   Psychosocial Support   Trust Relationship/Rapport care explained;emotional support provided;questions encouraged;reassurance provided;thoughts/feelings acknowledged

## 2023-11-07 NOTE — PROGRESS NOTES
Renal Medicine Progress Note            Assessment/Plan:     Assessment: Mya Hui is a 83-year-old woman with PMH DM2, hypothyroidism, HLD, HTN admitted 10/25/2023 due to hemorrhagic shock after lumbar spinal fusion.  Course complicated by LISA and encephalopathy.     LISA-D  CKD IIIA  Anasarca  Hypoalbuminemia  Baseline creatinine 1.1-1.4, EGFR 45-50.  No history of appreciable proteinuria.  Worsening creatinine in setting of hemorrhagic shock, most currently has ATN as a result.  Has developed fluid overload, Lasix drip started with good output, however renal function continues to have anephric rising creatinine daily despite this.      -Tunneled dialysis catheter placed by IR on 10/30  HD MWF. Some potential early urine output but remains dialysis dependent.      Hemorrhagic shock, resolved  S/p lumbar fusion  Shock liver, improved  Underwent lumbar fusion on 10/25, had 2 L blood loss during surgery.  Developed hemorrhagic shock thereafter.  Multiple transfusions and pressors, currently not requiring pressors.  ELY drains still in place.     Enterobacter PNA   Sputum cx with enterobacter. Zosyn switched to meropenem 11/1.         Plan/Recs:  1) dialysis tomorrow, will continue Monday Wednesday Friday schedule while monitoring for renal recovery.  2) continue daily BMPs, avoidance of nephrotoxins.  3) plan for transition to outpatient dialysis upon discharge.    Alfredo Spain DO  [facility]  Office: [phone]  Cell: [phone]        Interval History:     Pt tired, reports did not sleep well overnight. S/p dialysis yesterday, ran 3 hrs on 2K bath, 1.6kg UF done.     Notable 425cc urine yesterday, 200 today after days of oliguria.           Medications and Allergies:      - MEDICATION INSTRUCTIONS for Dialysis Patients -   Does not apply See Admin Instructions    alteplase  2 mg Intracatheter Once in dialysis/CRRT    alteplase  2 mg Intracatheter Once in dialysis/CRRT    artificial saliva  2  "spray Swish & Spit 4x Daily    B and C vitamin Complex with folic acid  5 mL Per Feeding Tube Daily    diltiazem  60 mg Oral or Feeding Tube Q6H NATACHA    heparin ANTICOAGULANT  5,000 Units Subcutaneous Q8H    hydrALAZINE  10 mg Oral or Feeding Tube Q8H    insulin aspart  1-7 Units Subcutaneous TID AC    insulin aspart  1-5 Units Subcutaneous At Bedtime    levofloxacin  500 mg Intravenous Q48H    levothyroxine  100 mcg Oral or NG Tube QAM AC    nystatin   Topical BID    pantoprazole  40 mg Per Feeding Tube QAM AC    polyethylene glycol  17 g Oral or NG Tube Daily    protein modular  1 packet Per Feeding Tube Daily    senna-docusate  1 tablet Oral or NG Tube BID    sodium chloride (PF)  10 mL Intracatheter Q8H    sodium chloride (PF)  3 mL Intracatheter Q8H    sodium chloride (PF)  3 mL Intracatheter Q8H    traZODone  25 mg Oral At Bedtime        Allergies   Allergen Reactions    Fentanyl Nausea and Vomiting     VERY sensitive to most narcotics if not all.    Morphine And Related Nausea            Physical Exam:   Vitals were reviewed  /66 (BP Location: Right arm)   Pulse 87   Temp 98.2  F (36.8  C) (Oral)   Resp 18   Ht 1.651 m (5' 5\")   Wt 100.9 kg (222 lb 7.1 oz)   LMP  (LMP Unknown)   SpO2 95%   BMI 37.02 kg/m      Wt Readings from Last 3 Encounters:   11/04/23 100.9 kg (222 lb 7.1 oz)   10/19/23 95 kg (209 lb 8 oz)   09/28/23 97.1 kg (214 lb)       Intake/Output Summary (Last 24 hours) at 11/7/2023 1151  Last data filed at 11/7/2023 0600  Gross per 24 hour   Intake 400 ml   Output 1800 ml   Net -1400 ml       GENERAL APPEARANCE: NAD  HEENT: Dry mucous membranes  RESP: Right-sided lung coarse rhonchi  CV: RRR, no murmur  ABDOMEN: soft, nontender  EXTREMITIES/SKIN: 1+ in lower extremities.  NEURO: Awake and alert, somewhat confused, answers questions appropriately              Data:     BMP  Recent Labs   Lab 11/07/23  1025 11/07/23  0527 11/07/23  0158 11/06/23 2057 11/06/23  0727 11/06/23  0557 " 11/05/23  0759 11/05/23  0514 11/04/23  0804 11/04/23  0409   NA  --  139  --   --   --  140  --  140  --  140   POTASSIUM  --  3.9  --   --   --  4.8  --  4.4  --  4.4   CHLORIDE  --  97*  --   --   --  98  --  97*  --  97*   TERESA  --  7.9*  --   --   --  8.1*  --  8.3*  --  8.1*   CO2  --  26  --   --   --  23  --  22  --  22   BUN  --  45.2*  --   --   --  89.6*  --  74.3*  --  55.4*   CR  --  4.64*  --   --   --  7.18*  --  6.15*  --  4.50*   * 106* 99 126*   < > 98   < > 105*   < > 88    < > = values in this interval not displayed.     CBC  Recent Labs   Lab 11/04/23  0409 11/03/23  0458 11/02/23  0521 11/01/23  0419   WBC 16.2* 18.5* 16.7* 10.9   HGB 8.8* 8.1* 8.1* 8.1*   HCT 27.7* 25.4* 24.5* 24.4*   MCV 90 90 87 88    183 156 117*     Lab Results   Component Value Date    AST 49 (H) 11/07/2023    ALT 23 11/07/2023    ALKPHOS 122 (H) 11/07/2023    BILITOTAL 0.4 11/07/2023    CHARLETTE 42 10/31/2023     Lab Results   Component Value Date    INR 1.57 (H) 10/27/2023       Attestation:  I have reviewed today's vital signs, notes, medications, labs and imaging.    DO [facility]  Office: [phone]  Cell: [phone]

## 2023-11-07 NOTE — PROGRESS NOTES
Hospitalist Progress Note    Interval History   No complaints today. Per nurse PO intake remains poor. Speech plans VFSS and will make recommendations on advancing PO intake vs tube feed later today  Addendum: notable dysphagia on VFSS, SLP recommends to proceed with tube feeds, called nurse to proceed with tube feed placement today, then starting tube feeds thereafter    Assessment & Plan   Summary: Mya Hui is a 83 year old female with PMH Scoliosis, Lumbar radiculopathy, HTN, HLP, DM2, Hypothyroidism, CKD stage 3, Morbid obesity, GERD, Sliding hiatal hernia, Overactive bladder, OA, Chronic right shoulder pain/rotator cuff arthropathy, Migraines, Asymptomatic carotid artery stenosis who was admitted on 10/25/2023 with an elective T11 to pelvis robotic decompression and fusion.  Surgery was performed on 10/25/23 under general anesthesia.  Surgery was complicated with development of arrhythmia with significant hypotension and noted 3.5 L blood loss per Op Note and Anesthesia postprocedure evaluation note.  Patient received IV fluids, 6 units of pRBCs and 2 units of FFP intra-op.  ST changes were noted intraoperatively.  Patient remained intubated and started on Levophed and was transferred to the ICU due to hemorrhagic shock.  She required vasopressor support with Levophed until 10/27.   Patient remained intubated until the morning of 11/2 and has been on supplemental O2 per Oxymask currently at 4 L/m.   Patient received additional 2 units FFP, 10 units of cryo and 1 unit of PLT. Transferred to hospitalist on 11/3.    Post-op hemorrhagic shock, resolved  Shocked liver, improving  Type 2 MI due to hemorrhagic shock, improved  Surgery was complicated with development of arrhythmia with significant hypotension and noted 3.5 L blood loss per Op Note and Anesthesia postprocedure evaluation note.  Patient received IV fluids, 6 units of pRBCs and 2 units of FFP intra-op.  Patient received additional 2 units FFP, 10 units of cryo and 1 unit of PLT while in the ICU. LFTs continue to improve 11/5-11/7.  - Monitor liver function    Encephalopathy, multifactorial, improving  Incidental finding of left basal ganglia restricted diffusion  Patient was noted to be encephalopathic for which Vascular Neurology was consulted.  Brain MRI was completed and noted a left basal ganglia small focus of restricted diffusion but not a cause for encephalopathy and felt it was likely multifactorial.  EEG monitor was completed and negative and subsequently stopped.  Suspect due to uremic encephalopathy and sepsis.   Encephalopathy improved 11/6  Delirium prevention:   - Reorient patient at each interaction.  - Keep room lights on and blinds open during day (8am-8pm), low lights 8pm-8am.  - Minimize interruptions of sleep at night (consolidate vitals, nursing assessments, medications).  - Avoid sedating medications as able.    - Vascular Neurology recommended repeat brain MRI in 2-3 months.      LISA/ATN requiring hemodialysis  Anion gap metabolic acidosis due to above  CKD stage 3a  Hypoalbuminemia  Anasarca  Baseline creatinine between 1.1-1.4 with GFR in the 40-50's.    Patient had been developed fluid overload and was treated with lasix drip with good output but developed anephric rising creatinine, BUN and developed worsening acidosis.  Due to development of LISA-D Nephrology was consulted and tunnel dialysis catheter placed 10/30 and has been undergoing HD Mon, Wed, Fri (last session was today, 11/3/2023).     Troponin were trended and minimally elevated.  ECHO completed and without any abnormalities (LV normal size and function with EF of 65-70%.  RV normal size, function and structure.  No valve disease).  - Nephrology consultation appreciated/continued  - Continue dialysis  - Avoid nephrotic medications as able.    - Hold PTA lisinopril 40 mg/d    Aspiration/ventilator associated PNA (Enterobacter,  stenotrophomonas and E. Coli)  Acute hypoxic respiratory failure (Improving)  Leukocytosis due to above  Extubated 11/2. Patient has also developed aspiration/ventilator associated pneumonia with sputum Cx growing Enterobacter, stenotrophomonas and E. coli and was initially started on Zoysn and switched to Meropenem on 11/1/2023.  Currently continued on Meropenem and awaiting final cultures. Procal 3. Sputum cultures growing Enterobacter, E coli, Stenotrophomonas.   O2 requirements down to 1L on 11/6  - Continue Levofloxacin, likely 14 day course ending 11/14  - Follow sputum cultures  - Duonebs scheduled  - Wean o2 as able  - RCAT consult requested  - Encourage use of IS/Flutter valve.    - Off IMC today    Dysphagia  Moderate malnutrition  Failed SLP assessment. Bedside feeding tube placement coiling due to hiatal hernia. Note improvement in dysphagia on 11/5, upgraded to pureed diet. Discussed with SLP, hold off tube feed placement while patient's dysphagia is improving, consider if improvement stalls or oral intake remains below target.  - Continue SLP assessments   - No NG tube placement today--pending VFSS  - Transition meds to oral    Scoliosis  Lumbar radiculopathy  S/p T11 to pelvis robotic decompression and fusion  POD# 9.   - Neurosurgery is managing.   - DVT ppx  - Pain control  - Chest PT  - Neuro checks q4h   - PT/OT    HTN  - Continue Diltiazem 60 mg every 6 hours and hydralazine 10 mg every 8 hours  - Hold lisinopril    HLP: Hold PTA pravastatin 40 mg/d.      Type 2 DM, controlled  Stress induced hyperglycemia  - High dose insulin sliding scale ordered.  - Sliding scale insulin  - Hypoglycemic protocol in place.      Hypothyroidism: PTA synthroid pending feeding tube placement    GERD  Sliding hiatal hernia  - PTA PPI pending feeding tube placement    OA  Chronic right shoulder pain/rotator cuff arthropathy  Chronic Pain  - Pain/analgesic management per Neurosurgery.      Obesity   Suspected  "ANAYELI  Body mass index is 37.64 kg/m .  Increase in all-cause morbidity and mortality.   - Follow up with PCP regarding ongoing management.    - Monitor O2 saturations.    - Recommend outpatient sleep study.      Overactive bladder: Hold PTA Ditropan XL 10 mg/d.      Migraines: Pain/analgesic management per Neurosurgery.      Asymptomatic carotid artery stenosis.    Clinically Significant Risk Factors             # Anion Gap Metabolic Acidosis: Highest Anion Gap = 19 mmol/L in last 2 days, will monitor and treat as appropriate  # Hypoalbuminemia: Lowest albumin = 2.5 g/dL at 11/1/2023  4:19 AM, will monitor as appropriate     # Hypertension: Noted on problem list        # Obesity: Estimated body mass index is 37.02 kg/m  as calculated from the following:    Height as of this encounter: 1.651 m (5' 5\").    Weight as of this encounter: 100.9 kg (222 lb 7.1 oz).   # Moderate Malnutrition: based on nutrition assessment      # Financial/Environmental Concerns: none          PT/OT: yes  Diet: Adult Formula Drip Feeding: Continuous Novasource Renal; Nasogastric tube; Goal Rate: 40; mL/hr; Once new FT placed and OK to use, resume TF at 20 mL/hr x 12 hours and then advance to goal.  Hold TF 1 hour before and 1 hour after Synthroid adminis...  Combination Diet Pureed Diet (level 4); Mildly Thick (level 2) (1:1 assist/supervision, by tsp only, cue hard swallows per tsp, sit at 90 degrees, crush meds with puree, ok for single ice chips, hold po if respiratory status declines)    DVT Prophylaxis: Heparin SQ  Spicer Catheter: Not present  Lines: PRESENT      CVC Double Lumen Right Internal jugular Non - valved (open ended);Tunneled-Site Assessment: WDL      Cardiac Monitoring: ACTIVE order. Indication: hypoxia, pneumonia  Code Status: Full Code    Disposition: Anticipated discharge pending improvement in oral intake, possibly to AZEB Anguiano MD  Hospitalist Service  [facility]  Securely " message with [employer] (more info)  Text page via [employer] Paging/Directory     Data reviewed today: I reviewed all new labs and imaging results over the last 24 hours.    Physical Exam   Temp: 98.2  F (36.8  C) Temp src: Oral BP: 125/66 Pulse: 87   Resp: 18 SpO2: 95 % O2 Device: Nasal cannula Oxygen Delivery: 1 LPM  Vitals:    11/02/23 0525 11/03/23 0600 11/04/23 0000   Weight: 101.2 kg (223 lb 1.7 oz) 102.6 kg (226 lb 3.1 oz) 100.9 kg (222 lb 7.1 oz)     Vital Signs with Ranges  Temp:  [97.3  F (36.3  C)-98.8  F (37.1  C)] 98.2  F (36.8  C)  Pulse:  [67-93] 87  Resp:  [16-28] 18  BP: ()/(62-88) 125/66  SpO2:  [95 %-98 %] 95 %  I/O last 3 completed shifts:  In: 620 [P.O.:600; I.V.:20]  Out: 2025 [Urine:425; Other:1600]  O2 requirements: yes    Constitutional: Elderly female appears ill, intermittent coughing  HEENT: Eyes nonicteric, oral mucosa moist  Cardiovascular: RRR, normal S1/2, no m/r/g  Respiratory: Very coarse with some mild wheezing  Vascular: Trace BLE pitting edema  GI: Normoactive bowel sounds, appears nontender  Skin/Integumen: No rashes  Neuro/Psych: Confused and oriented to self and year, moves all extremities    Medications        - MEDICATION INSTRUCTIONS for Dialysis Patients -   Does not apply See Admin Instructions    alteplase  2 mg Intracatheter Once in dialysis/CRRT    alteplase  2 mg Intracatheter Once in dialysis/CRRT    artificial saliva  2 spray Swish & Spit 4x Daily    B and C vitamin Complex with folic acid  5 mL Per Feeding Tube Daily    diltiazem  60 mg Oral or Feeding Tube Q6H NATACHA    heparin ANTICOAGULANT  5,000 Units Subcutaneous Q8H    hydrALAZINE  10 mg Oral or Feeding Tube Q8H    insulin aspart  1-7 Units Subcutaneous TID AC    insulin aspart  1-5 Units Subcutaneous At Bedtime    levofloxacin  500 mg Intravenous Q48H    levothyroxine  100 mcg Oral or NG Tube QAM AC    nystatin   Topical BID    pantoprazole  40 mg Per Feeding Tube QAM AC    polyethylene glycol  17 g Oral or NG  Tube Daily    protein modular  1 packet Per Feeding Tube Daily    senna-docusate  1 tablet Oral or NG Tube BID    sodium chloride (PF)  10 mL Intracatheter Q8H    sodium chloride (PF)  3 mL Intracatheter Q8H    sodium chloride (PF)  3 mL Intracatheter Q8H    traZODone  25 mg Oral At Bedtime       Data   Recent Labs   Lab 11/07/23  1025 11/07/23  0527 11/07/23  0158 11/06/23  0727 11/06/23  0557 11/05/23  0759 11/05/23  0514 11/04/23  0804 11/04/23  0409 11/03/23  0502 11/03/23  0458 11/02/23  0800 11/02/23  0521   WBC  --   --   --   --   --   --   --   --  16.2*  --  18.5*  --  16.7*   HGB  --   --   --   --   --   --   --   --  8.8*  --  8.1*  --  8.1*   MCV  --   --   --   --   --   --   --   --  90  --  90  --  87   PLT  --   --   --   --   --   --   --   --  187  --  183  --  156   NA  --  139  --   --  140  --  140  --  140  --  140  --  139   POTASSIUM  --  3.9  --   --  4.8  --  4.4  --  4.4   < > 4.1  --  4.1   CHLORIDE  --  97*  --   --  98  --  97*  --  97*  --  96*  --  98   CO2  --  26  --   --  23  --  22  --  22  --  22  --  22   BUN  --  45.2*  --   --  89.6*  --  74.3*  --  55.4*  --  90.1*  --  75.6*   CR  --  4.64*  --   --  7.18*  --  6.15*  --  4.50*   < > 6.37*  --  4.88*   ANIONGAP  --  16*  --   --  19*  --  21*  --  21*  --  22*  --  19*   TERESA  --  7.9*  --   --  8.1*  --  8.3*  --  8.1*  --  7.9*  --  7.8*   * 106* 99   < > 98   < > 105*   < > 88   < > 95   < > 108*  115*   ALBUMIN  --  3.0*  --   --  2.9*  --  3.0*  --  2.9*  --  2.7*  --  2.9*   PROTTOTAL  --  5.7*  --   --  5.8*  --  5.6*  --  5.7*  --  5.1*  --  5.0*   BILITOTAL  --  0.4  --   --  0.5  --  0.5  --  0.6  --  0.5  --  0.6   ALKPHOS  --  122*  --   --  126*  --  131*  --  148*  --  141*  --  121*   ALT  --  23  --   --  26  --  28  --  36  --  38  --  36   AST  --  49*  --   --  64*  --  72*  --  113*  --  140*  --  101*    < > = values in this interval not displayed.       Imaging:   No results found for this or  any previous visit (from the past 24 hour(s)).

## 2023-11-07 NOTE — PROGRESS NOTES
"[facility]    Neurosurgery Progress Note    Date of Service (when I saw the patient): 11/07/2023     Assessment & Plan     Procedure(s):  Thoracic 11 to pelvis robotic decompression and fusion   -13 Days Post-Op  Patient seen lying in bed. Patient denies new or worsening paresthesia/weakness. Patient has not been able to stand OOB, thus has not been able to get upright XR in TLSO.    Plan:  -OK for DVT ppx, confirmed with Dr. Waite  -OK for chest PT, confirmed with Dr. Waite  -Incentive spirometer  -Neuro check q4H  -Goal normotension  -TLSO when out of bed, OK to be OFF with sitting, resting and hygiene  -Obtain supine XR  - Appreciate assistance from medical team  - Pain control measures as needed  - Routine wound care, dressing removed over the weekend  - Evaluate incision for possible staple removal tomorrow   - PT/OT    I have discussed the following assessment and plan Dr. Waite who is in agreement with initial plan and will follow up with further consultation recommendations.    Tami Cheema PA-C  [facility] Neurosurgery  [facility]  [address] [place] [address]      Interval History   stable    Physical Exam   Temp: 98.2  F (36.8  C) Temp src: Oral BP: 125/66 Pulse: 87   Resp: 18 SpO2: 95 % O2 Device: Nasal cannula Oxygen Delivery: 1 LPM  Vitals:    11/02/23 0525 11/03/23 0600 11/04/23 0000   Weight: 101.2 kg (223 lb 1.7 oz) 102.6 kg (226 lb 3.1 oz) 100.9 kg (222 lb 7.1 oz)     Vital Signs with Ranges  Temp:  [97.3  F (36.3  C)-98.8  F (37.1  C)] 98.2  F (36.8  C)  Pulse:  [67-87] 87  Resp:  [16-28] 18  BP: (104-162)/(62-88) 125/66  SpO2:  [95 %-98 %] 95 %  I/O last 3 completed shifts:  In: 60 [P.O.:50; I.V.:10]  Out: 1800 [Urine:200; Other:1600]     , Blood pressure 125/66, pulse 87, temperature 98.2  F (36.8  C), temperature source Oral, resp. rate 18, height 1.651 m (5' 5\"), weight 100.9 kg (222 lb 7.1 oz), SpO2 95%.  222 lbs 7.11 " oz    NEUROLOGICAL EXAMINATION:   Mental status:  Alert and following commands. NAD  Cranial nerves:  II-XII intact.   Motor:    Patient able to lift BLE off bed spontaneously   Patient able to move BUE spontaneously - difficult to assess strength due to patient participation  Sensation intact  Incision: C/D/I, staples intact. Open to air. No erythema or swelling     Medications      - MEDICATION INSTRUCTIONS for Dialysis Patients -   Does not apply See Admin Instructions    alteplase  2 mg Intracatheter Once in dialysis/CRRT    alteplase  2 mg Intracatheter Once in dialysis/CRRT    artificial saliva  2 spray Swish & Spit 4x Daily    B and C vitamin Complex with folic acid  5 mL Per Feeding Tube Daily    diltiazem  60 mg Oral or Feeding Tube Q6H NATACHA    heparin ANTICOAGULANT  5,000 Units Subcutaneous Q8H    hydrALAZINE  10 mg Oral or Feeding Tube Q8H    insulin aspart  1-7 Units Subcutaneous TID AC    insulin aspart  1-5 Units Subcutaneous At Bedtime    levofloxacin  500 mg Intravenous Q48H    levothyroxine  100 mcg Oral or NG Tube QAM AC    nystatin   Topical BID    pantoprazole  40 mg Per Feeding Tube QAM AC    polyethylene glycol  17 g Oral or NG Tube Daily    protein modular  1 packet Per Feeding Tube Daily    senna-docusate  1 tablet Oral or NG Tube BID    sodium chloride (PF)  10 mL Intracatheter Q8H    sodium chloride (PF)  3 mL Intracatheter Q8H    sodium chloride (PF)  3 mL Intracatheter Q8H    traZODone  25 mg Oral At Bedtime       Data     CBC RESULTS:   Recent Labs   Lab Test 11/04/23  0409   WBC 16.2*   RBC 3.07*   HGB 8.8*   HCT 27.7*   MCV 90   MCH 28.7   MCHC 31.8   RDW 17.1*        Basic Metabolic Panel:  Lab Results   Component Value Date     11/07/2023     01/06/2021      Lab Results   Component Value Date    POTASSIUM 3.9 11/07/2023    POTASSIUM 4.7 10/25/2023    POTASSIUM 4.5 05/13/2022    POTASSIUM 4.3 01/06/2021     Lab Results   Component Value Date    CHLORIDE 97 11/07/2023     CHLORIDE 112 05/13/2022    CHLORIDE 112 01/06/2021     Lab Results   Component Value Date    TERESA 7.9 11/07/2023    TERESA 9.2 01/06/2021     Lab Results   Component Value Date    CO2 26 11/07/2023    CO2 26 05/13/2022    CO2 30 01/06/2021     Lab Results   Component Value Date    BUN 45.2 11/07/2023    BUN 17 05/13/2022    BUN 21 01/06/2021     Lab Results   Component Value Date    CR 4.64 11/07/2023    CR 1.04 01/06/2021     Lab Results   Component Value Date     11/07/2023     11/07/2023     05/13/2022    GLC 89 01/06/2021     INR:  Lab Results   Component Value Date    INR 1.57 10/27/2023    INR 1.58 10/26/2023    INR 1.73 10/25/2023    INR 2.11 10/25/2023    INR 1.62 10/25/2023

## 2023-11-07 NOTE — PLAN OF CARE
Goal Outcome Evaluation:  Pt alert and oriented to self. disoriented to times,place and situation.waxes and wanes with orientation.  VSS on 1L NC. C/o of back pain. PRN oxy given. Tele: NSR. LS diminished. Frequent congested cough. IS encouraged. R chest tunneled cath, L midline ,PIV WNL.Incisions mid lower back with staples,CHIKI.  Scattered bruising.mepilex to R forearm,buttocks. Incontinent of bowel and bladder. Little uop,on HD. Q2 hrs,T/R. TLSO brace when OOB.Plan: Hold off on NG placement and monitor PO intake, purred diet/w mildly thick liquids.Upright  Spine XR ordered.

## 2023-11-08 ENCOUNTER — APPOINTMENT (OUTPATIENT)
Dept: GENERAL RADIOLOGY | Facility: CLINIC | Age: 83
DRG: 453 | End: 2023-11-08
Payer: COMMERCIAL

## 2023-11-08 LAB
ALBUMIN SERPL BCG-MCNC: 3 G/DL (ref 3.5–5.2)
ALP SERPL-CCNC: 108 U/L (ref 35–104)
ALT SERPL W P-5'-P-CCNC: 21 U/L (ref 0–50)
ANION GAP SERPL CALCULATED.3IONS-SCNC: 14 MMOL/L (ref 7–15)
AST SERPL W P-5'-P-CCNC: 36 U/L (ref 0–45)
BILIRUB DIRECT SERPL-MCNC: <0.2 MG/DL (ref 0–0.3)
BILIRUB SERPL-MCNC: 0.4 MG/DL
BUN SERPL-MCNC: 55.9 MG/DL (ref 8–23)
CALCIUM SERPL-MCNC: 8.2 MG/DL (ref 8.8–10.2)
CHLORIDE SERPL-SCNC: 97 MMOL/L (ref 98–107)
CREAT SERPL-MCNC: 6.08 MG/DL (ref 0.51–0.95)
DEPRECATED HCO3 PLAS-SCNC: 27 MMOL/L (ref 22–29)
EGFRCR SERPLBLD CKD-EPI 2021: 6 ML/MIN/1.73M2
GLUCOSE BLDC GLUCOMTR-MCNC: 121 MG/DL (ref 70–99)
GLUCOSE BLDC GLUCOMTR-MCNC: 133 MG/DL (ref 70–99)
GLUCOSE BLDC GLUCOMTR-MCNC: 88 MG/DL (ref 70–99)
GLUCOSE BLDC GLUCOMTR-MCNC: 92 MG/DL (ref 70–99)
GLUCOSE SERPL-MCNC: 98 MG/DL (ref 70–99)
MAGNESIUM SERPL-MCNC: 1.8 MG/DL (ref 1.7–2.3)
PHOSPHATE SERPL-MCNC: 8.2 MG/DL (ref 2.5–4.5)
POTASSIUM SERPL-SCNC: 4.2 MMOL/L (ref 3.4–5.3)
PROT SERPL-MCNC: 5.7 G/DL (ref 6.4–8.3)
SODIUM SERPL-SCNC: 138 MMOL/L (ref 135–145)

## 2023-11-08 PROCEDURE — 250N000013 HC RX MED GY IP 250 OP 250 PS 637: Performed by: INTERNAL MEDICINE

## 2023-11-08 PROCEDURE — 83735 ASSAY OF MAGNESIUM: CPT | Performed by: INTERNAL MEDICINE

## 2023-11-08 PROCEDURE — 250N000013 HC RX MED GY IP 250 OP 250 PS 637: Performed by: NURSE PRACTITIONER

## 2023-11-08 PROCEDURE — 82248 BILIRUBIN DIRECT: CPT | Performed by: INTERNAL MEDICINE

## 2023-11-08 PROCEDURE — 72100 X-RAY EXAM L-S SPINE 2/3 VWS: CPT

## 2023-11-08 PROCEDURE — 90935 HEMODIALYSIS ONE EVALUATION: CPT | Performed by: INTERNAL MEDICINE

## 2023-11-08 PROCEDURE — 99233 SBSQ HOSP IP/OBS HIGH 50: CPT | Performed by: HOSPITALIST

## 2023-11-08 PROCEDURE — 80053 COMPREHEN METABOLIC PANEL: CPT | Performed by: INTERNAL MEDICINE

## 2023-11-08 PROCEDURE — 85049 AUTOMATED PLATELET COUNT: CPT

## 2023-11-08 PROCEDURE — G0463 HOSPITAL OUTPT CLINIC VISIT: HCPCS

## 2023-11-08 PROCEDURE — 120N000001 HC R&B MED SURG/OB

## 2023-11-08 PROCEDURE — 634N000001 HC RX 634: Mod: JZ | Performed by: INTERNAL MEDICINE

## 2023-11-08 PROCEDURE — 250N000013 HC RX MED GY IP 250 OP 250 PS 637: Performed by: NEUROLOGICAL SURGERY

## 2023-11-08 PROCEDURE — 250N000011 HC RX IP 250 OP 636: Mod: JZ | Performed by: INTERNAL MEDICINE

## 2023-11-08 PROCEDURE — 84100 ASSAY OF PHOSPHORUS: CPT | Performed by: INTERNAL MEDICINE

## 2023-11-08 PROCEDURE — 250N000011 HC RX IP 250 OP 636: Performed by: INTERNAL MEDICINE

## 2023-11-08 RX ADMIN — TRAZODONE HYDROCHLORIDE 25 MG: 50 TABLET ORAL at 21:31

## 2023-11-08 RX ADMIN — HYDRALAZINE HYDROCHLORIDE 10 MG: 10 TABLET ORAL at 05:33

## 2023-11-08 RX ADMIN — DILTIAZEM HYDROCHLORIDE 60 MG: 60 TABLET, FILM COATED ORAL at 05:33

## 2023-11-08 RX ADMIN — MICONAZOLE NITRATE: 2 POWDER TOPICAL at 20:18

## 2023-11-08 RX ADMIN — OXYCODONE HYDROCHLORIDE 5 MG: 5 TABLET ORAL at 10:33

## 2023-11-08 RX ADMIN — NYSTATIN: 100000 CREAM TOPICAL at 20:20

## 2023-11-08 RX ADMIN — HEPARIN SODIUM 5000 UNITS: 5000 INJECTION, SOLUTION INTRAVENOUS; SUBCUTANEOUS at 00:21

## 2023-11-08 RX ADMIN — ALTEPLASE 2 MG: 2.2 INJECTION, POWDER, LYOPHILIZED, FOR SOLUTION INTRAVENOUS at 09:30

## 2023-11-08 RX ADMIN — SENNOSIDES AND DOCUSATE SODIUM 1 TABLET: 8.6; 5 TABLET ORAL at 21:31

## 2023-11-08 RX ADMIN — Medication 2 SPRAY: at 21:37

## 2023-11-08 RX ADMIN — DILTIAZEM HYDROCHLORIDE 60 MG: 60 TABLET, FILM COATED ORAL at 21:36

## 2023-11-08 RX ADMIN — Medication 40 MG: at 05:33

## 2023-11-08 RX ADMIN — EPOETIN ALFA-EPBX 10000 UNITS: 10000 INJECTION, SOLUTION INTRAVENOUS; SUBCUTANEOUS at 11:48

## 2023-11-08 RX ADMIN — ALTEPLASE 2 MG: 2.2 INJECTION, POWDER, LYOPHILIZED, FOR SOLUTION INTRAVENOUS at 09:31

## 2023-11-08 RX ADMIN — LEVOTHYROXINE SODIUM 100 MCG: 100 TABLET ORAL at 05:33

## 2023-11-08 RX ADMIN — DILTIAZEM HYDROCHLORIDE 60 MG: 60 TABLET, FILM COATED ORAL at 18:08

## 2023-11-08 RX ADMIN — HEPARIN SODIUM 5000 UNITS: 5000 INJECTION, SOLUTION INTRAVENOUS; SUBCUTANEOUS at 23:48

## 2023-11-08 RX ADMIN — HYDRALAZINE HYDROCHLORIDE 10 MG: 10 TABLET ORAL at 21:31

## 2023-11-08 RX ADMIN — HEPARIN SODIUM 5000 UNITS: 5000 INJECTION, SOLUTION INTRAVENOUS; SUBCUTANEOUS at 18:08

## 2023-11-08 ASSESSMENT — ACTIVITIES OF DAILY LIVING (ADL)
ADLS_ACUITY_SCORE: 48
ADLS_ACUITY_SCORE: 48
ADLS_ACUITY_SCORE: 40
ADLS_ACUITY_SCORE: 48
ADLS_ACUITY_SCORE: 40
ADLS_ACUITY_SCORE: 48
ADLS_ACUITY_SCORE: 40
ADLS_ACUITY_SCORE: 48

## 2023-11-08 NOTE — PLAN OF CARE
Goal Outcome Evaluation:   Orientations: A&O x1. Confused to place, time, situation, Q4H neuro checks, generalized weakness noted--unchanged throughout day  Vitals/Pain: VSS on NC at 1 LPM. Pain rated 0/10  Tele: SR   Lines/Drains: R Peripheral IV, saline-locked, double lumen right internal jugular, midline to LUE  Skin/Wounds: Open wound in R groin--WOC consult placed. Wound on L forearm, midline incision on back, wound on L lower back  GI/: Purewick in place, nausea indicated in the morning--treated with antiemetic per MAR  Resp: Lung sounds diminished and expiratory wheezing on L side, diminished on R side with pleural rub and expiratory wheezes  Labs: Abnormal/Trends, Electrolyte Replacement- CR trending down  Ambulation/Assist: Lift utilized for transfers, transferred to chair once   Sleep Quality: Pt indicated that sleep has been poor, frequently napping during the day  Plan: Video swallow study done today (see results), plan for FT placement 11/08, pt has dialysis 11/08

## 2023-11-08 NOTE — PROGRESS NOTES
Care Management Follow Up    Length of Stay (days): 14    Additional Information:  Received a call from [facility] LTACH Liaison. Patient does not meet criteria for LTACH . Yee feels patient is more appropriate for TCU at discharge as patient has improved since admission.     Mariam Peters RN -[phone]

## 2023-11-08 NOTE — CONSULTS
"[facility] Nurse Inpatient Assessment     Consulted for: Wound R groin wound     Summary: patient with POA masd with moisture split to right groin     Patient History (according to provider note(s):      \"83-year-old woman with PMH DM2, hypothyroidism, HLD, HTN admitted 10/25/2023 due to hemorrhagic shock after lumbar spinal fusion.  Course complicated by LISA and encephalopathy. \"    Assessment:      Areas visualized during today's visit: Focused: and Perineal area    Wound location: right groin right abd fold     Last photo: 11/8  Wound due to: Moisture Associated Skin Damage (MASD) moisture split   Wound history/plan of care: found with interdry in use   Wound base:  dermis     Palpation of the wound bed: normal      Drainage: scant     Description of drainage: serosanguinous     Measurements (length x width x depth, in cm): 7  x 0.3  x  0.1 cm      Tunneling: N/A     Undermining: N/A  Periwound skin: Intact      Color: normal and consistent with surrounding tissue      Temperature: normal   Odor: none  Pain: denies , none  Pain interventions prior to dressing change: patient tolerated well  Treatment goal: Heal  and Decrease moisture  STATUS: initial assessment  Supplies ordered: supplies stored on unit and discussed with patient        Treatment Plan:       Wound care  Start:  11/08/23 1242,   EVERY SHIFT,   Routine        Comments: Location: right abd fold, right groin  Care: provided BID by primary RN  1. Cleanse BID with routine hygiene (showering, bathing, mild plain soap and water), then pat dry  2. Apply Interdry textile to skin fold, cut piece Large enough so that at least 2 inches of fabric hangs outside the skin fold when the abd is relaxed  3. Ok to use each piece of interdry for up to 5 days, see below for more details. See instructions on box as well.    Daily:  Interdry(order#[id number]):  1.  Wash skin gently. Pat, do not rub.  2.  With clean scissors, cut enough fabric " "to cover the affected area, allowing for a minimum of 2 inches to extend beyond the skin fold for moisture evaporation.  3.  Lay a single layer of fabric in the skin fold, placing one edge into the base of the fold. Gently smooth the rest of the fabric over the skin, keeping it flat.  4.  Leave at least 2 inches of fabric exposed outside the skin fold.  5.  Secure the fabric in one of several ways: with the skin fold, with a small amount of skin-friendly tape, or tucked under clothing.  6.  Remove the fabric before bathing and reuse it when finished. When removing, gently separate the skin fold and lift away.  Helpful Hints  1. InterDry  can be written on with a ballpoint pen. It may be helpful to label each piece of InterDry  with the date you started using it.  2.  Each piece of InterDry  may be used up to 5 days, depending on fabric soiling, odor, amount of moisture and general skin condition. Replace InterDry  if it becomes soiled with blood, urine or stool.  3.  Do not use creams, ointments, or powders with InterDry  as it may interfere with product efficacy.          Skin care precautions  Start:  11/08/23 1242,   EFFECTIVE NOW,   Routine        Comments: Pressure Injury Prevention (PIP) Plan:  If patient is declining pressure injury prevention interventions: Explore reason why and address patient's concerns, Educate on pressure injury risk and prevention intervention(s), If patient is still declining, document \"informed refusal\" , and Ensure Care team is aware ( provider, charge nurse, etc)  Mattress: Follow bed algorithm, reassess daily and order specialty mattress, if indicated.  HOB: Maintain at or below 30 degrees, unless contraindicated  Repositioning in bed: Every 1-2 hours , Left/right positioning; avoid supine, and Raise foot of bed prior to raising head of bed, to reduce patient sliding down (shear)  Heels: Keep elevated off mattress and Pillows under calves  Protective Dressing: Sacral Mepilex for " prevention (#325967),  especially for the agitated patient  Chair positioning: Chair cushion (#739002) , Assist patient to reposition hourly, and Do NOT use a donut for sitting (this increases pressure to smaller area and creates a higher potential for injury)    If patient has a buttock pressure injury, or high risk for PI use chair cushion or SPS.  Moisture Management: Perineal cleansing /protection: Follow Incontinence Protocol, Avoid brief in bed, Clean and dry skin folds with bathing , Consider InterDry (#592653) between folds, and Moisturize dry skin  Under Devices: Inspect skin under all medical devices during skin inspection , Ensure tubes are stabilized without tension, and Ensure patient is not lying on medical devices or equipment when repositioned  Ask provider to discontinue device when no longer needed.        Orders: Written    RECOMMEND PRIMARY TEAM ORDER: None, at this time  Education provided: plan of care, Moisture management, and Hygiene  Discussed plan of care with: Patient, Nurse, and dialysis nurses x2  WOC nurse follow-up plan: weekly  Notify WOC if wound(s) deteriorate.  Nursing to notify the Provider(s) and re-consult the WOC Nurse if new skin concern.    DATA:     Current support surface: Standard  Standard gel/foam mattress (IsoFlex, Atmos air, etc)  Containment of urine/stool: Incontinence Protocol and Incontinent pad in bed  BMI: Body mass index is 37.02 kg/m .   Active diet order: Orders Placed This Encounter      Combination Diet Clear Liquid Diet; Liquidized/Moderately Thick (level 3) (By tsp only, 1:1 assist/supervision, 2-3 hard swallows per tsp, single ice chips, hold if aspiration signs noted)     Output: I/O last 3 completed shifts:  In: 383 [P.O.:350; I.V.:33]  Out: 200 [Urine:200]     Labs:   Recent Labs   Lab 11/08/23  0546 11/05/23  0514 11/04/23  0409   ALBUMIN 3.0*   < > 2.9*   HGB  --   --  8.8*   WBC  --   --  16.2*    < > = values in this interval not displayed.      Pressure injury risk assessment:   Sensory Perception: 3-->slightly limited  Moisture: 3-->occasionally moist  Activity: 2-->chairfast  Mobility: 2-->very limited  Nutrition: 1-->very poor  Friction and Shear: 2-->potential problem  Rick Score: 13    Nikole CWOCN   1st choice: Securely message with [employer] ([facility] [employer])   (2nd option: [facility] Office Phone [phone], messages checked periodically Mon-Fri 8a-4p)

## 2023-11-08 NOTE — PROGRESS NOTES
Potassium   Date Value Ref Range Status   11/08/2023 4.2 3.4 - 5.3 mmol/L Final   05/13/2022 4.5 3.4 - 5.3 mmol/L Final   01/06/2021 4.3 3.4 - 5.3 mmol/L Final     Potassium POCT   Date Value Ref Range Status   10/25/2023 4.7 3.4 - 5.3 mmol/L Final     Comment:     000     Hemoglobin   Date Value Ref Range Status   11/04/2023 8.8 (L) 11.7 - 15.7 g/dL Final   09/23/2020 13.1 11.7 - 15.7 g/dL Final     Creatinine   Date Value Ref Range Status   11/08/2023 6.08 (H) 0.51 - 0.95 mg/dL Final   01/06/2021 1.04 0.52 - 1.04 mg/dL Final       DIALYSIS PROCEDURE NOTE  Hepatitis status of previous patient on machine log was checked and verified ok to use with this patients hepatitis status.  Patient dialyzed for 3.5 hrs. on a K3 bath with a net fluid removal of  3L.  A BFR of 300-400 ml/min was obtained via a Right CVC.  The treatment plan was discussed with Dr. Pike during the treatment.    Total heparin received during the treatment: 0 units.   Line flushed, clamped and capped with heparin 1:1000 1.9 mL (1900 units) per lumen    Meds  given: none   Complications: access issues. Pt ran for about 15 minutes. Pt rinsed back Tpa for about 30-45 minutes. New system set up tx reinitiated. After about 2 hours system clotted. New set up and tx reinitiated without issues.     Person educated: pt. Knowledge base very knowledgeable. Barriers to learning: none. Educated on procedure via verbal mode. Patient verbalized understanding.   ICEBOAT? Timeout performed pre-treatment  I: Patient was identified using 2 identifiers  C:  Consent Signed Yes  E: Equipment preventative maintenance is current and dialysis delivery system OK to use  B: Hepatitis B Surface Antigen: Non reactive; Draw Date: 10/30/2023      Hepatitis B Surface Antibody: Reactive; Draw Date: 10/30/2023  O: Dialysis orders present and complete prior to treatment  A: Vascular access verified and assessed prior to treatment  T: Treatment was performed at a clinically  appropriate time  ?: Patient was allowed to ask questions and address concerns prior to treatment  See Adult Hemodialysis flowsheet in EPIC for further details and post assessment.  Machine water alarm in place and functioning. Transducer pods intact and checked every 15min.   Pt assisted with repositioning throughout dialysis treatment.  Pt returned via bed..  Chlorine/Chloramine water system checked every 4 hours.    Post treatment report given to BALAJI Vieyra regarding 3L of fluid removed, last BP    Mary MARQUIS RN

## 2023-11-08 NOTE — DISCHARGE INSTRUCTIONS
WOUND CARES  Location: right abd fold, right groin   Care: provided BID by primary RN   1. Cleanse BID with routine hygiene (showering, bathing, mild plain soap and water), then pat dry   2. Apply Interdry textile to skin fold, cut piece Large enough so that at least 2 inches of fabric hangs outside the skin fold when the abd is relaxed   3. Ok to use each piece of interdry for up to 5 days, see below for more details. See instructions on box as well.

## 2023-11-08 NOTE — PROGRESS NOTES
"[facility]    Neurosurgery Progress Note    Date of Service (when I saw the patient): 11/08/2023     Assessment & Plan     Procedure(s):  Thoracic 11 to pelvis robotic decompression and fusion   -14 Days Post-Op  Patient seen lying in bed during dialysis. Denies back pain, radicular leg pain or paresthesias. No new or worsening paresthesia/weakness.     Plan:  -Staple removal today, orders in place  -OK for DVT ppx, confirmed with Dr. Waite  -OK for chest PT, confirmed with Dr. Waite  -Incentive spirometer  -Neuro check q4H  -Goal normotension  -TLSO when out of bed, OK to be OFF with sitting, resting and hygiene  - Appreciate assistance from medical team  - Pain control measures as needed  - Routine wound care, dressing removed over the weekend  - Evaluate incision for possible staple removal tomorrow   - PT/OT    I have discussed the following assessment and plan Dr. Waite who is in agreement with initial plan and will follow up with further consultation recommendations.    Rhina Gregory, PONCHO  [facility] Neurosurgery  [address] [address]  Tel [phone]  Fax [phone]  Text page via [facility] Paging/Directory      Interval History   Stable    Physical Exam   Temp: 97.2  F (36.2  C) Temp src: Oral BP: 125/71 Pulse: 89   Resp: 24 SpO2: 98 % O2 Device: Nasal cannula Oxygen Delivery: 1 LPM  Vitals:    11/02/23 0525 11/03/23 0600 11/04/23 0000   Weight: 101.2 kg (223 lb 1.7 oz) 102.6 kg (226 lb 3.1 oz) 100.9 kg (222 lb 7.1 oz)     Vital Signs with Ranges  Temp:  [97.2  F (36.2  C)-98.6  F (37  C)] 97.2  F (36.2  C)  Pulse:  [66-89] 89  Resp:  [11-26] 24  BP: (103-157)/(56-92) 125/71  SpO2:  [90 %-99 %] 98 %  I/O last 3 completed shifts:  In: 473 [P.O.:450; I.V.:23]  Out: 200 [Urine:200]     , Blood pressure 125/71, pulse 89, temperature 97.2  F (36.2  C), temperature source Oral, resp. rate 24, height 1.651 m (5' 5\"), weight 100.9 kg (222 lb 7.1 " oz), SpO2 98%.  222 lbs 7.11 oz    NEUROLOGICAL EXAMINATION:   Mental status:  Alert and following commands. NAD  Motor:    Patient able to lift BLE off bed spontaneously   Patient able to move BUE spontaneously, left>right  Sensation intact  Incision: KENDRICK incision due to dialysis     Medications      - MEDICATION INSTRUCTIONS for Dialysis Patients -   Does not apply See Admin Instructions    alteplase  2 mg Intracatheter Once in dialysis/CRRT    alteplase  2 mg Intracatheter Once in dialysis/CRRT    artificial saliva  2 spray Swish & Spit 4x Daily    B and C vitamin Complex with folic acid  5 mL Per Feeding Tube Daily    diltiazem  60 mg Oral or Feeding Tube Q6H NATACHA    heparin ANTICOAGULANT  5,000 Units Subcutaneous Q8H    hydrALAZINE  10 mg Oral or Feeding Tube Q8H    insulin aspart  1-7 Units Subcutaneous TID AC    insulin aspart  1-5 Units Subcutaneous At Bedtime    levofloxacin  500 mg Intravenous Q48H    levothyroxine  100 mcg Oral or NG Tube QAM AC    - MEDICATION INSTRUCTIONS -   Does not apply Once    nystatin   Topical BID    pantoprazole  40 mg Per Feeding Tube QAM AC    polyethylene glycol  17 g Oral or NG Tube Daily    protein modular  1 packet Per Feeding Tube Daily    senna-docusate  1 tablet Oral or NG Tube BID    sodium chloride (PF)  3 mL Intracatheter Q8H    sodium chloride 0.9%  250 mL Intravenous Once in dialysis/CRRT    sodium chloride 0.9%  300 mL Hemodialysis Machine Once    traZODone  25 mg Oral At Bedtime       Data     CBC RESULTS:   Recent Labs   Lab Test 11/04/23  0409   WBC 16.2*   RBC 3.07*   HGB 8.8*   HCT 27.7*   MCV 90   MCH 28.7   MCHC 31.8   RDW 17.1*        Basic Metabolic Panel:  Lab Results   Component Value Date     11/07/2023     01/06/2021      Lab Results   Component Value Date    POTASSIUM 3.9 11/07/2023    POTASSIUM 4.7 10/25/2023    POTASSIUM 4.5 05/13/2022    POTASSIUM 4.3 01/06/2021     Lab Results   Component Value Date    CHLORIDE 97 11/07/2023     CHLORIDE 112 05/13/2022    CHLORIDE 112 01/06/2021     Lab Results   Component Value Date    TERESA 7.9 11/07/2023    TERESA 9.2 01/06/2021     Lab Results   Component Value Date    CO2 26 11/07/2023    CO2 26 05/13/2022    CO2 30 01/06/2021     Lab Results   Component Value Date    BUN 45.2 11/07/2023    BUN 17 05/13/2022    BUN 21 01/06/2021     Lab Results   Component Value Date    CR 4.64 11/07/2023    CR 1.04 01/06/2021     Lab Results   Component Value Date     11/07/2023     11/07/2023     05/13/2022    GLC 89 01/06/2021     INR:  Lab Results   Component Value Date    INR 1.57 10/27/2023    INR 1.58 10/26/2023    INR 1.73 10/25/2023    INR 2.11 10/25/2023    INR 1.62 10/25/2023

## 2023-11-08 NOTE — PLAN OF CARE
Goal Outcome Evaluation:  10/25: Hemorrhagic shock after Lumbar spinal fusion, LISA, Encephalopathy.  Summary:  11/7 4930-0422  Orientation/Cognition: AOxself; Orientation can be intermittent; confused/forgetful.  Neuro: intact; R-side weaker, d/t pt orientation-unable to assess.  Vitals/Tele: VSS on 1L NC; NSR; denies pain  Skin: Scattered bruising/scabs. Cracking/peeling/rash beneath breasts,groin,perineum. Mepi in place on coccyx,L forearm,back-CDI. Incision on L-spine with staples-intact/CHIKI.  GI/: Purewick in place; minimal UOP.   Drains:PIV-SL, Dialysis catheter intact. LUE PICC-intact with blood return noted.   Diet: Clears, Mod thick liq (3). 1:1 total feed. Ice chips okay. Meds with applesauce.  Lab: AM labs; BG q4h   Mobility: A2/Lift. Pt refuses activity. T&R q2h  Tests/Procedures: 11/8 dialysis and TF placement by CXR d/t hiatal hernia.  Consult: Speech, neuro,nephrology, reg diet, WOC, PT/OT

## 2023-11-08 NOTE — PROGRESS NOTES
Renal Medicine Progress Note            Assessment/Plan:     Assessment: Mya Hui is a 83-year-old woman with PMH DM2, hypothyroidism, HLD, HTN admitted 10/25/2023 due to hemorrhagic shock after lumbar spinal fusion.  Course complicated by LISA and encephalopathy.     LISA-D  CKD IIIA  Anasarca  Hypoalbuminemia  Baseline creatinine 1.1-1.4, EGFR 45-50.  No history of appreciable proteinuria.  Worsening creatinine in setting of hemorrhagic shock, most currently has ATN as a result.  Has developed fluid overload, anuria and then inititaed dialysis.     -Tunneled dialysis catheter placed by IR on 10/30  HD MWF. Some early urine output but remains dialysis dependent.      Hemorrhagic shock, resolved  S/p lumbar fusion  Shock liver, improved  Underwent lumbar fusion on 10/25, had 2 L blood loss during surgery.  Developed hemorrhagic shock thereafter.  Multiple transfusions and pressors, currently not requiring pressors.  ELY drains still in place.     Enterobacter PNA   Sputum cx with enterobacter. Zosyn switched to meropenem 11/1.         Plan/Recs:  1) dialysis today per MW, schedule while monitoring for renal recovery.   UF goal 1.5kg  2) continue daily BMPs, avoidance of nephrotoxins.  3) plan for transition to outpatient dialysis upon discharge, appears [facility]  4) placement     Alfredo Spain DO  [facility] consultants  Office: [phone]  Cell: [phone]        Interval History:     Pt seen on dialysis. Refused NG tube placement. On transport cart and complaining of not being able to sit up well.     200 ml urine out yesterday, 200 recorded so far today.          Medications and Allergies:      - MEDICATION INSTRUCTIONS for Dialysis Patients -   Does not apply See Admin Instructions    alteplase  2 mg Intracatheter Once in dialysis/CRRT    alteplase  2 mg Intracatheter Once in dialysis/CRRT    artificial saliva  2 spray Swish & Spit 4x Daily    B and C vitamin Complex with folic acid  5 mL  "Per Feeding Tube Daily    diltiazem  60 mg Oral or Feeding Tube Q6H [facility]    epoetin stephie-epbx  10,000 Units Intravenous Once in dialysis/CRRT    heparin ANTICOAGULANT  5,000 Units Subcutaneous Q8H    hydrALAZINE  10 mg Oral or Feeding Tube Q8H    insulin aspart  1-7 Units Subcutaneous TID AC    insulin aspart  1-5 Units Subcutaneous At Bedtime    levofloxacin  500 mg Intravenous Q48H    levothyroxine  100 mcg Oral or NG Tube QAM AC    - MEDICATION INSTRUCTIONS -   Does not apply Once    nystatin   Topical BID    pantoprazole  40 mg Per Feeding Tube QAM AC    polyethylene glycol  17 g Oral or NG Tube Daily    protein modular  1 packet Per Feeding Tube Daily    senna-docusate  1 tablet Oral or NG Tube BID    sodium chloride (PF)  3 mL Intracatheter Q8H    sodium chloride 0.9%  250 mL Intravenous Once in dialysis/CRRT    sodium chloride 0.9%  300 mL Hemodialysis Machine Once    traZODone  25 mg Oral At Bedtime        Allergies   Allergen Reactions    Fentanyl Nausea and Vomiting     VERY sensitive to most narcotics if not all.    Morphine And Related Nausea            Physical Exam:   Vitals were reviewed  /56   Pulse 82   Temp 98.4  F (36.9  C) (Oral)   Resp 18   Ht 1.651 m (5' 5\")   Wt 100.9 kg (222 lb 7.1 oz)   LMP  (LMP Unknown)   SpO2 97%   BMI 37.02 kg/m      Wt Readings from Last 3 Encounters:   11/04/23 100.9 kg (222 lb 7.1 oz)   10/19/23 95 kg (209 lb 8 oz)   09/28/23 97.1 kg (214 lb)       Intake/Output Summary (Last 24 hours) at 11/8/2023 1046  Last data filed at 11/8/2023 0700  Gross per 24 hour   Intake 483 ml   Output 200 ml   Net 283 ml       GENERAL APPEARANCE: NAD  HEENT: Dry mucous membranes  RESP: clear ant/lat b/.  CV: RRR, no murmur  ABDOMEN: soft, nontender  EXTREMITIES/SKIN: 1+ in lower extremities.  NEURO: Awake and alert           Data:     [facility]  Recent Labs   Lab 11/08/23  0755 11/08/23  0546 11/08/23  0151 11/07/23  2200 11/07/23  1025 11/07/23  0527 11/06/23  0727 11/06/23  0557 " 11/05/23  0759 11/05/23  0514   NA  --  138  --   --   --  139  --  140  --  140   POTASSIUM  --  4.2  --   --   --  3.9  --  4.8  --  4.4   CHLORIDE  --  97*  --   --   --  97*  --  98  --  97*   TERESA  --  8.2*  --   --   --  7.9*  --  8.1*  --  8.3*   CO2  --  27  --   --   --  26  --  23  --  22   BUN  --  55.9*  --   --   --  45.2*  --  89.6*  --  74.3*   CR  --  6.08*  --   --   --  4.64*  --  7.18*  --  6.15*   * 98 92 98   < > 106*   < > 98   < > 105*    < > = values in this interval not displayed.     CBC  Recent Labs   Lab 11/04/23  0409 11/03/23  0458 11/02/23  0521   WBC 16.2* 18.5* 16.7*   HGB 8.8* 8.1* 8.1*   HCT 27.7* 25.4* 24.5*   MCV 90 90 87    183 156     Lab Results   Component Value Date    AST 36 11/08/2023    ALT 21 11/08/2023    ALKPHOS 108 (H) 11/08/2023    BILITOTAL 0.4 11/08/2023    CHARLETTE 42 10/31/2023     Lab Results   Component Value Date    INR 1.57 (H) 10/27/2023       Attestation:  I have reviewed today's vital signs, notes, medications, labs and imaging.    DO [facility]phrology  Office: [phone]  Cell: [phone]

## 2023-11-08 NOTE — PROGRESS NOTES
[facility]    Hospitalist Progress Note    Interval History   Unable to place NGT d/t patient refusal  Later on she is agreeable. She is worried about pain. We can try and use numbing or even pain medications  Encephalopathy probably contributing to her refusal  Daughter states she can be here at the bedside to help  her through it.  Overall family indicates her encephalopathy is improving, but remains highly fluctuating.     Assessment & Plan   Summary: Mya Hui is a 83 year old female with PMH Scoliosis, Lumbar radiculopathy, HTN, HLP, DM2, Hypothyroidism, CKD stage 3, Morbid obesity, GERD, Sliding hiatal hernia, Overactive bladder, OA, Chronic right shoulder pain/rotator cuff arthropathy, Migraines, Asymptomatic carotid artery stenosis who was admitted on 10/25/2023 with an elective T11 to pelvis robotic decompression and fusion.  Surgery was performed on 10/25/23 under general anesthesia.  Surgery was complicated with development of arrhythmia with significant hypotension and noted 3.5 L blood loss per Op Note and Anesthesia postprocedure evaluation note.  Patient received IV fluids, 6 units of pRBCs and 2 units of FFP intra-op.  ST changes were noted intraoperatively.  Patient remained intubated and started on Levophed and was transferred to the ICU due to hemorrhagic shock.  She required vasopressor support with Levophed until 10/27.   Patient remained intubated until the morning of 11/2 and has been on supplemental O2 per Oxymask currently at 4 L/m.   Patient received additional 2 units FFP, 10 units of cryo and 1 unit of PLT. Transferred to hospitalist on 11/3.    Post-op hemorrhagic shock, resolved  Shocked liver, improving  Type 2 MI due to hemorrhagic shock, improved  Surgery was complicated with development of arrhythmia with significant hypotension and noted 3.5 L blood loss per Op Note and Anesthesia postprocedure evaluation note.  Patient received IV fluids, 6  units of pRBCs and 2 units of FFP intra-op. Patient received additional 2 units FFP, 10 units of cryo and 1 unit of PLT while in the ICU. LFTs continue to improve 11/5-11/7.  - Monitor liver function    Encephalopathy, multifactorial, improving  Incidental finding of left basal ganglia restricted diffusion  Patient was noted to be encephalopathic for which Vascular Neurology was consulted.  Brain MRI was completed and noted a left basal ganglia small focus of restricted diffusion but not a cause for encephalopathy and felt it was likely multifactorial.  EEG monitor was completed and negative and subsequently stopped.  Suspect due to uremic encephalopathy and sepsis.   Encephalopathy improved 11/6  Delirium prevention:   - Reorient patient at each interaction.  - Keep room lights on and blinds open during day (8am-8pm), low lights 8pm-8am.  - Minimize interruptions of sleep at night (consolidate vitals, nursing assessments, medications).  - Avoid sedating medications as able.    - Vascular Neurology recommended repeat brain MRI in 2-3 months.      LISA/ATN requiring hemodialysis  Anion gap metabolic acidosis due to above  CKD stage 3a  Hypoalbuminemia  Anasarca  Baseline creatinine between 1.1-1.4 with GFR in the 40-50's.    Patient had been developed fluid overload and was treated with lasix drip with good output but developed anephric rising creatinine, BUN and developed worsening acidosis.  Due to development of LISA-D Nephrology was consulted and tunnel dialysis catheter placed 10/30 and has been undergoing HD Mon, Wed, Fri (last session was today, 11/3/2023).     Troponin were trended and minimally elevated.  ECHO completed and without any abnormalities (LV normal size and function with EF of 65-70%.  RV normal size, function and structure.  No valve disease).  - Nephrology consultation appreciated/continued  - Continue dialysis  - Avoid nephrotic medications as able.    - Hold PTA lisinopril 40  mg/d    Aspiration/ventilator associated PNA (Enterobacter, stenotrophomonas and E. Coli)  Acute hypoxic respiratory failure (Improving)  Leukocytosis due to above  Extubated 11/2. Patient has also developed aspiration/ventilator associated pneumonia with sputum Cx growing Enterobacter, stenotrophomonas and E. coli and was initially started on Zoysn and switched to Meropenem on 11/1/2023.  Currently continued on Meropenem and awaiting final cultures. Procal 3. Sputum cultures growing Enterobacter, E coli, Stenotrophomonas.   O2 requirements down to 1L on 11/6  - Continue Levofloxacin, likely 14 day course ending 11/14  - Follow sputum cultures  - Christiane scheduled  - Wean o2 as able  - RCAT consult requested  - Encourage use of IS/Flutter valve.    - Off IMC today    Dysphagia  Moderate malnutrition  Failed SLP assessment. Bedside feeding tube placement coiling due to hiatal hernia. Note improvement in dysphagia on 11/5, upgraded to pureed diet. Discussed with SLP, hold off tube feed placement while patient's dysphagia is improving, consider if improvement stalls or oral intake remains below target.  - Continue SLP assessments   - No NG tube placement today--pending VFSS  - Transition meds to oral    Scoliosis  Lumbar radiculopathy  S/p T11 to pelvis robotic decompression and fusion  POD# 9.   - Neurosurgery is managing.   - DVT ppx  - Pain control  - Chest PT  - Neuro checks q4h   - PT/OT    HTN  - Continue Diltiazem 60 mg every 6 hours and hydralazine 10 mg every 8 hours  - Hold lisinopril    HLP: Hold PTA pravastatin 40 mg/d.      Type 2 DM, controlled  Stress induced hyperglycemia  - High dose insulin sliding scale ordered.  - Sliding scale insulin  - Hypoglycemic protocol in place.      Hypothyroidism: PTA synthroid pending feeding tube placement    GERD  Sliding hiatal hernia  - PTA PPI pending feeding tube placement    OA  Chronic right shoulder pain/rotator cuff arthropathy  Chronic Pain  - Pain/analgesic  "management per Neurosurgery.      Obesity   Suspected ANAYELI  Body mass index is 37.64 kg/m .  Increase in all-cause morbidity and mortality.   - Follow up with PCP regarding ongoing management.    - Monitor O2 saturations.    - Recommend outpatient sleep study.      Overactive bladder: Hold PTA Ditropan XL 10 mg/d.      Migraines: Pain/analgesic management per Neurosurgery.      Asymptomatic carotid artery stenosis.    NSVT  Plan  - monitor on telemetry with close electrolyte management    Clinically Significant Risk Factors              # Hypoalbuminemia: Lowest albumin = 2.5 g/dL at 11/1/2023  4:19 AM, will monitor as appropriate     # Hypertension: Noted on problem list        # Obesity: Estimated body mass index is 37.02 kg/m  as calculated from the following:    Height as of this encounter: 1.651 m (5' 5\").    Weight as of this encounter: 100.9 kg (222 lb 7.1 oz).   # Moderate Malnutrition: based on nutrition assessment      # Financial/Environmental Concerns: none          PT/OT: yes  Diet: Adult Formula Drip Feeding: Continuous Novasource Renal; Nasogastric tube; Goal Rate: 40; mL/hr; Once new FT placed and OK to use, resume TF at 20 mL/hr x 12 hours and then advance to goal.  Hold TF 1 hour before and 1 hour after Synthroid adminis...  Combination Diet Clear Liquid Diet; Liquidized/Moderately Thick (level 3) (By tsp only, 1:1 assist/supervision, 2-3 hard swallows per tsp, single ice chips, hold if aspiration signs noted)    DVT Prophylaxis: Heparin SQ  Spicer Catheter: Not present  Lines: PRESENT      CVC Double Lumen Right Internal jugular Non - valved (open ended);Tunneled-Site Assessment: WDL      Cardiac Monitoring: ACTIVE order. Indication: hypoxia, pneumonia  Code Status: Full Code    Disposition: Anticipated discharge pending improvement in oral intake, possibly to ARU    Yanick Moura DO  Hospitalist Service  [facility]  Securely message with [employer] (more info)  Text " page via [facility] Paging/Directory     Data reviewed today: I reviewed all new labs and imaging results over the last 24 hours.    Physical Exam   Temp: 97.2  F (36.2  C) Temp src: Oral BP: 125/71 Pulse: 89   Resp: 24 SpO2: 98 % O2 Device: Nasal cannula Oxygen Delivery: 1 LPM  Vitals:    11/02/23 0525 11/03/23 0600 11/04/23 0000   Weight: 101.2 kg (223 lb 1.7 oz) 102.6 kg (226 lb 3.1 oz) 100.9 kg (222 lb 7.1 oz)     Vital Signs with Ranges  Temp:  [97.2  F (36.2  C)-98.6  F (37  C)] 97.2  F (36.2  C)  Pulse:  [66-89] 89  Resp:  [11-26] 24  BP: (103-157)/(56-92) 125/71  SpO2:  [90 %-99 %] 98 %  I/O last 3 completed shifts:  In: 473 [P.O.:450; I.V.:23]  Out: 200 [Urine:200]  O2 requirements: yes    Constitutional: Elderly female appears ill, intermittent coughing  HEENT: Eyes nonicteric, oral mucosa moist  Cardiovascular: RRR, normal S1/2, no m/r/g  Respiratory: Very coarse with some mild wheezing  Vascular: Trace BLE pitting edema  GI: Normoactive bowel sounds, appears nontender  Skin/Integumen: No rashes  Neuro/Psych: Confused and oriented to self and year, moves all extremities    Medications        - MEDICATION INSTRUCTIONS for Dialysis Patients -   Does not apply See Admin Instructions    alteplase  2 mg Intracatheter Once in dialysis/CRRT    alteplase  2 mg Intracatheter Once in dialysis/CRRT    artificial saliva  2 spray Swish & Spit 4x Daily    B and C vitamin Complex with folic acid  5 mL Per Feeding Tube Daily    diltiazem  60 mg Oral or Feeding Tube Q6H NATACHA    heparin ANTICOAGULANT  5,000 Units Subcutaneous Q8H    hydrALAZINE  10 mg Oral or Feeding Tube Q8H    insulin aspart  1-7 Units Subcutaneous TID AC    insulin aspart  1-5 Units Subcutaneous At Bedtime    levofloxacin  500 mg Intravenous Q48H    levothyroxine  100 mcg Oral or NG Tube QAM AC    - MEDICATION INSTRUCTIONS -   Does not apply Once    nystatin   Topical BID    pantoprazole  40 mg Per Feeding Tube QAM AC    polyethylene glycol  17 g Oral or NG  Tube Daily    protein modular  1 packet Per Feeding Tube Daily    senna-docusate  1 tablet Oral or NG Tube BID    sodium chloride (PF)  3 mL Intracatheter Q8H    sodium chloride 0.9%  250 mL Intravenous Once in dialysis/CRRT    sodium chloride 0.9%  300 mL Hemodialysis Machine Once    traZODone  25 mg Oral At Bedtime       Data   Recent Labs   Lab 11/08/23  1616 11/08/23  0755 11/08/23  0546 11/07/23  1025 11/07/23  0527 11/06/23  0727 11/06/23  0557 11/04/23  0804 11/04/23  0409 11/03/23  0502 11/03/23  0458 11/02/23  0800 11/02/23  0521   WBC  --   --   --   --   --   --   --   --  16.2*  --  18.5*  --  16.7*   HGB  --   --   --   --   --   --   --   --  8.8*  --  8.1*  --  8.1*   MCV  --   --   --   --   --   --   --   --  90  --  90  --  87   PLT  --   --   --   --   --   --   --   --  187  --  183  --  156   NA  --   --  138  --  139  --  140   < > 140  --  140  --  139   POTASSIUM  --   --  4.2  --  3.9  --  4.8   < > 4.4   < > 4.1  --  4.1   CHLORIDE  --   --  97*  --  97*  --  98   < > 97*  --  96*  --  98   CO2  --   --  27  --  26  --  23   < > 22  --  22  --  22   BUN  --   --  55.9*  --  45.2*  --  89.6*   < > 55.4*  --  90.1*  --  75.6*   CR  --   --  6.08*  --  4.64*  --  7.18*   < > 4.50*   < > 6.37*  --  4.88*   ANIONGAP  --   --  14  --  16*  --  19*   < > 21*  --  22*  --  19*   TERESA  --   --  8.2*  --  7.9*  --  8.1*   < > 8.1*  --  7.9*  --  7.8*   GLC 88 121* 98   < > 106*   < > 98   < > 88   < > 95   < > 108*  115*   ALBUMIN  --   --  3.0*  --  3.0*  --  2.9*   < > 2.9*  --  2.7*  --  2.9*   PROTTOTAL  --   --  5.7*  --  5.7*  --  5.8*   < > 5.7*  --  5.1*  --  5.0*   BILITOTAL  --   --  0.4  --  0.4  --  0.5   < > 0.6  --  0.5  --  0.6   ALKPHOS  --   --  108*  --  122*  --  126*   < > 148*  --  141*  --  121*   ALT  --   --  21  --  23  --  26   < > 36  --  38  --  36   AST  --   --  36  --  49*  --  64*   < > 113*  --  140*  --  101*    < > = values in this interval not displayed.        Imaging:   Recent Results (from the past 24 hour(s))   XR Lumbar Spine 2/3 Views    Narrative    LUMBAR SPINE TWO TO THREE VIEWS  11/8/2023 8:15 AM     HISTORY: Postop thoracic/lumbar surgery.    COMPARISON: 10/16/2023 CT       Impression    IMPRESSION:    Diffuse osteopenia, which somewhat limits evaluation.    Interval [id number] posterior pedicle screw and amber fixation with  interbody disc grafts at L3-L4 and L4-L5 and bilateral sacroiliac  arthrodesis screws. Hardware appears intact without evidence of acute  hardware failure. Skin staples are present.    Similar extent of S-shaped thoracolumbar scoliosis. No significant  spondylolisthesis. Extensive lumbar spondylosis with multilevel disc  space height loss, osteophyte formation, and facet arthropathy.  Thoracic diffuse idiopathic skeletal hyperostosis.    Aortoiliac atherosclerotic calcifications. Prior cholecystectomy.  Barium is present throughout the colon.    THIEN MENDOZA MD         SYSTEM ID:  [id number]

## 2023-11-09 ENCOUNTER — APPOINTMENT (OUTPATIENT)
Dept: GENERAL RADIOLOGY | Facility: CLINIC | Age: 83
DRG: 453 | End: 2023-11-09
Attending: HOSPITALIST
Payer: COMMERCIAL

## 2023-11-09 ENCOUNTER — APPOINTMENT (OUTPATIENT)
Dept: GENERAL RADIOLOGY | Facility: CLINIC | Age: 83
DRG: 453 | End: 2023-11-09
Attending: NEUROLOGICAL SURGERY
Payer: COMMERCIAL

## 2023-11-09 ENCOUNTER — APPOINTMENT (OUTPATIENT)
Dept: SPEECH THERAPY | Facility: CLINIC | Age: 83
DRG: 453 | End: 2023-11-09
Attending: NEUROLOGICAL SURGERY
Payer: COMMERCIAL

## 2023-11-09 LAB
ALBUMIN SERPL BCG-MCNC: 3 G/DL (ref 3.5–5.2)
ALP SERPL-CCNC: 105 U/L (ref 35–104)
ALT SERPL W P-5'-P-CCNC: 22 U/L (ref 0–50)
ANION GAP SERPL CALCULATED.3IONS-SCNC: 11 MMOL/L (ref 7–15)
AST SERPL W P-5'-P-CCNC: 47 U/L (ref 0–45)
BILIRUB DIRECT SERPL-MCNC: <0.2 MG/DL (ref 0–0.3)
BILIRUB SERPL-MCNC: 0.4 MG/DL
BUN SERPL-MCNC: 25.5 MG/DL (ref 8–23)
CALCIUM SERPL-MCNC: 8.2 MG/DL (ref 8.8–10.2)
CHLORIDE SERPL-SCNC: 99 MMOL/L (ref 98–107)
CREAT SERPL-MCNC: 3.75 MG/DL (ref 0.51–0.95)
DEPRECATED HCO3 PLAS-SCNC: 29 MMOL/L (ref 22–29)
EGFRCR SERPLBLD CKD-EPI 2021: 12 ML/MIN/1.73M2
GLUCOSE BLDC GLUCOMTR-MCNC: 106 MG/DL (ref 70–99)
GLUCOSE BLDC GLUCOMTR-MCNC: 116 MG/DL (ref 70–99)
GLUCOSE BLDC GLUCOMTR-MCNC: 117 MG/DL (ref 70–99)
GLUCOSE BLDC GLUCOMTR-MCNC: 118 MG/DL (ref 70–99)
GLUCOSE BLDC GLUCOMTR-MCNC: 129 MG/DL (ref 70–99)
GLUCOSE SERPL-MCNC: 106 MG/DL (ref 70–99)
MAGNESIUM SERPL-MCNC: 1.6 MG/DL (ref 1.7–2.3)
PHOSPHATE SERPL-MCNC: 4.7 MG/DL (ref 2.5–4.5)
PLATELET # BLD AUTO: 181 10E3/UL (ref 150–450)
POTASSIUM SERPL-SCNC: 4.1 MMOL/L (ref 3.4–5.3)
PROT SERPL-MCNC: 6 G/DL (ref 6.4–8.3)
SODIUM SERPL-SCNC: 139 MMOL/L (ref 135–145)

## 2023-11-09 PROCEDURE — 250N000011 HC RX IP 250 OP 636: Mod: JZ | Performed by: NEUROLOGICAL SURGERY

## 2023-11-09 PROCEDURE — 250N000011 HC RX IP 250 OP 636: Performed by: INTERNAL MEDICINE

## 2023-11-09 PROCEDURE — 250N000013 HC RX MED GY IP 250 OP 250 PS 637: Performed by: INTERNAL MEDICINE

## 2023-11-09 PROCEDURE — 80053 COMPREHEN METABOLIC PANEL: CPT | Performed by: INTERNAL MEDICINE

## 2023-11-09 PROCEDURE — 99233 SBSQ HOSP IP/OBS HIGH 50: CPT | Performed by: HOSPITALIST

## 2023-11-09 PROCEDURE — 99207 PR CDG-CUT & PASTE-POTENTIAL IMPACT ON LEVEL: CPT | Performed by: HOSPITALIST

## 2023-11-09 PROCEDURE — 120N000001 HC R&B MED SURG/OB

## 2023-11-09 PROCEDURE — 250N000011 HC RX IP 250 OP 636: Mod: JZ | Performed by: INTERNAL MEDICINE

## 2023-11-09 PROCEDURE — 83735 ASSAY OF MAGNESIUM: CPT | Performed by: INTERNAL MEDICINE

## 2023-11-09 PROCEDURE — 84100 ASSAY OF PHOSPHORUS: CPT | Performed by: INTERNAL MEDICINE

## 2023-11-09 PROCEDURE — 250N000013 HC RX MED GY IP 250 OP 250 PS 637: Performed by: NURSE PRACTITIONER

## 2023-11-09 PROCEDURE — 92526 ORAL FUNCTION THERAPY: CPT | Mod: GN | Performed by: SPEECH-LANGUAGE PATHOLOGIST

## 2023-11-09 PROCEDURE — 44500 INTRO GASTROINTESTINAL TUBE: CPT

## 2023-11-09 PROCEDURE — 74340 X-RAY GUIDE FOR GI TUBE: CPT

## 2023-11-09 PROCEDURE — 999N000065 XR ABDOMEN PORT 1 VIEW

## 2023-11-09 PROCEDURE — 82248 BILIRUBIN DIRECT: CPT | Performed by: INTERNAL MEDICINE

## 2023-11-09 PROCEDURE — 99232 SBSQ HOSP IP/OBS MODERATE 35: CPT | Performed by: INTERNAL MEDICINE

## 2023-11-09 PROCEDURE — 250N000009 HC RX 250: Performed by: NEUROLOGICAL SURGERY

## 2023-11-09 PROCEDURE — C9113 INJ PANTOPRAZOLE SODIUM, VIA: HCPCS | Mod: JZ | Performed by: NEUROLOGICAL SURGERY

## 2023-11-09 RX ORDER — LIDOCAINE HYDROCHLORIDE 20 MG/ML
JELLY TOPICAL ONCE
Status: COMPLETED | OUTPATIENT
Start: 2023-11-09 | End: 2023-11-09

## 2023-11-09 RX ADMIN — HEPARIN SODIUM 5000 UNITS: 5000 INJECTION, SOLUTION INTRAVENOUS; SUBCUTANEOUS at 09:28

## 2023-11-09 RX ADMIN — LEVOTHYROXINE SODIUM 100 MCG: 100 TABLET ORAL at 09:28

## 2023-11-09 RX ADMIN — DILTIAZEM HYDROCHLORIDE 60 MG: 60 TABLET, FILM COATED ORAL at 19:10

## 2023-11-09 RX ADMIN — NYSTATIN: 100000 CREAM TOPICAL at 09:29

## 2023-11-09 RX ADMIN — Medication 5 ML: at 15:58

## 2023-11-09 RX ADMIN — Medication 2 SPRAY: at 11:41

## 2023-11-09 RX ADMIN — Medication 2 SPRAY: at 21:53

## 2023-11-09 RX ADMIN — TRAZODONE HYDROCHLORIDE 25 MG: 50 TABLET ORAL at 21:53

## 2023-11-09 RX ADMIN — SENNOSIDES AND DOCUSATE SODIUM 1 TABLET: 8.6; 5 TABLET ORAL at 21:53

## 2023-11-09 RX ADMIN — DILTIAZEM HYDROCHLORIDE 60 MG: 60 TABLET, FILM COATED ORAL at 05:21

## 2023-11-09 RX ADMIN — LEVOFLOXACIN 500 MG: 5 INJECTION, SOLUTION INTRAVENOUS at 14:40

## 2023-11-09 RX ADMIN — DILTIAZEM HYDROCHLORIDE 60 MG: 60 TABLET, FILM COATED ORAL at 11:42

## 2023-11-09 RX ADMIN — OXYCODONE HYDROCHLORIDE 5 MG: 5 TABLET ORAL at 15:58

## 2023-11-09 RX ADMIN — DILTIAZEM HYDROCHLORIDE 60 MG: 60 TABLET, FILM COATED ORAL at 21:53

## 2023-11-09 RX ADMIN — PANTOPRAZOLE SODIUM 40 MG: 40 INJECTION, POWDER, FOR SOLUTION INTRAVENOUS at 09:19

## 2023-11-09 RX ADMIN — HEPARIN SODIUM 5000 UNITS: 5000 INJECTION, SOLUTION INTRAVENOUS; SUBCUTANEOUS at 15:58

## 2023-11-09 RX ADMIN — HYDRALAZINE HYDROCHLORIDE 10 MG: 10 TABLET ORAL at 05:21

## 2023-11-09 RX ADMIN — Medication 2 SPRAY: at 19:10

## 2023-11-09 RX ADMIN — ACETAMINOPHEN 650 MG: 325 TABLET, FILM COATED ORAL at 11:50

## 2023-11-09 RX ADMIN — NYSTATIN: 100000 CREAM TOPICAL at 21:53

## 2023-11-09 RX ADMIN — LIDOCAINE HYDROCHLORIDE 1 TUBE: 20 JELLY TOPICAL at 13:17

## 2023-11-09 RX ADMIN — HYDRALAZINE HYDROCHLORIDE 10 MG: 10 TABLET ORAL at 21:53

## 2023-11-09 RX ADMIN — HYDRALAZINE HYDROCHLORIDE 10 MG: 10 TABLET ORAL at 14:40

## 2023-11-09 ASSESSMENT — ACTIVITIES OF DAILY LIVING (ADL)
ADLS_ACUITY_SCORE: 40

## 2023-11-09 NOTE — PLAN OF CARE
Goal Outcome Evaluation:       Orientations: A&O x 2 D/t time and place. It also waxes and wanes.  Vitals/Pain: VSS on RA/ denies pain, SOB or dizziness. LS diminished, frequent congested cough  Tele: SR  Lines/Drains: HD cath CDI, Midline SL, PIV x1 SL  Skin/Wounds: abrasion under breast and groin bilaterally, midline incision on the back CDI, scattered bruising  GI/: +BS, no BM this shift, minimum UO due to dialysis.  Labs: Abnormal/Trends, Electrolyte Replacement- none  Ambulation/Assist: T & R, not OOB  Sleep Quality: poor  Plan: TF placement today, ctm.

## 2023-11-09 NOTE — PLAN OF CARE
Shift Summary [id number]:    Pt down in HD for most of the day (6989-1060). VSS on RA (was able to wean off O2). Alert to self and situation. Pain managed w/ repositioning and meds (see MAR). LS dim w/ exp wheezes and pleural rubs, MAYORGA. Tele: SR, denies CP. MD aware of PSVT runs, no new orders placed. Purewick in place. +BS. Midline and PIV SL. CVC tunnel cath WDL. Staples removed on back incision, steri strips placed. Anticipatory TF placement tomm w/ dgt present. Care plan updated.    Goal Outcome Evaluation:      Plan of Care Reviewed With: patient, family    Overall Patient Progress: no change    Outcome Evaluation: Pt had HD today. Refused TF placement, will attempt tomm    Problem: Malnutrition  Goal: Improved Nutritional Intake  Outcome: Not Progressing     Problem: Spinal Surgery  Goal: Effective Oxygenation and Ventilation  Outcome: Progressing

## 2023-11-09 NOTE — PROGRESS NOTES
Renal Medicine Progress Note            Assessment/Plan:     Assessment: Mya Hui is a 83-year-old woman with PMH DM2, hypothyroidism, HLD, HTN admitted 10/25/2023 due to hemorrhagic shock after lumbar spinal fusion.  Course complicated by LISA and encephalopathy.     LISA-D  CKD IIIA  Anasarca  Hypoalbuminemia  Baseline creatinine 1.1-1.4, EGFR 45-50.  No history of appreciable proteinuria.  Worsening creatinine in setting of hemorrhagic shock, most currently has ATN as a result.  Has developed fluid overload, anuria and then inititaed dialysis.     -Tunneled dialysis catheter placed by IR on 10/30  HD MWF. Remains oliguric      Hemorrhagic shock, resolved  S/p lumbar fusion  Shock liver, improved  Underwent lumbar fusion on 10/25, had 2 L blood loss during surgery.  Developed hemorrhagic shock thereafter.  Multiple transfusions and pressors, currently not requiring pressors.  ELY drains still in place.     Enterobacter PNA   Sputum cx with enterobacter. Zosyn switched to meropenem 11/1.         Plan/Recs:  1) dialysis tomorrow per [facility], schedule while monitoring for renal recovery.  2) continue daily BMPs, avoidance of nephrotoxins.  3) plan for transition to outpatient dialysis upon discharge, appears [facility] [place]      Alfredo Spain DO  [facility] consultants  Office: [phone]  Cell: [phone]        Interval History:      Pt remains oliguric, 200 ml urine out yesterday via pure-wick. 100 so far today.     Pt in pain in back, s/p NGT placement. BP's 120's.     Had 3L UF on dialysis yesterday.     No dyspnea, not needing oxygen during day.          Medications and Allergies:      - MEDICATION INSTRUCTIONS for Dialysis Patients -   Does not apply See Admin Instructions    alteplase  2 mg Intracatheter Once in dialysis/CRRT    alteplase  2 mg Intracatheter Once in dialysis/CRRT    artificial saliva  2 spray Swish & Spit 4x Daily    B and C vitamin Complex with folic acid  5 mL Per Feeding Tube  "Daily    diltiazem  60 mg Oral or Feeding Tube Q6H [facility]    heparin ANTICOAGULANT  5,000 Units Subcutaneous Q8H    hydrALAZINE  10 mg Oral or Feeding Tube Q8H    insulin aspart  1-7 Units Subcutaneous TID AC    insulin aspart  1-5 Units Subcutaneous At Bedtime    levofloxacin  500 mg Intravenous Q48H    levothyroxine  100 mcg Oral or NG Tube QAM AC    nystatin   Topical BID    pantoprazole  40 mg Per Feeding Tube QAM AC    Or    pantoprazole  40 mg Intravenous QAM AC    polyethylene glycol  17 g Oral or NG Tube Daily    protein modular  1 packet Per Feeding Tube Daily    senna-docusate  1 tablet Oral or NG Tube BID    sodium chloride (PF)  3 mL Intracatheter Q8H    sodium chloride 0.9%  250 mL Intravenous Once in dialysis/CRRT    sodium chloride 0.9%  300 mL Hemodialysis Machine Once    traZODone  25 mg Oral At Bedtime        Allergies   Allergen Reactions    Fentanyl Nausea and Vomiting     VERY sensitive to most narcotics if not all.    Morphine And Related Nausea            Physical Exam:   Vitals were reviewed  /64 (BP Location: Right arm)   Pulse 76   Temp 99.1  F (37.3  C) (Oral)   Resp 16   Ht 1.651 m (5' 5\")   Wt 100.9 kg (222 lb 7.1 oz)   LMP  (LMP Unknown)   SpO2 96%   BMI 37.02 kg/m      Wt Readings from Last 3 Encounters:   11/04/23 100.9 kg (222 lb 7.1 oz)   10/19/23 95 kg (209 lb 8 oz)   09/28/23 97.1 kg (214 lb)       Intake/Output Summary (Last 24 hours) at 11/9/2023 1607  Last data filed at 11/9/2023 1400  Gross per 24 hour   Intake 103 ml   Output 100 ml   Net 3 ml     GENERAL APPEARANCE: NAD  HEENT: Dry mucous membranes  RESP: clear ant/lat b/.  CV: RRR, no murmur  ABDOMEN: soft, nontender  EXTREMITIES/SKIN: 1+ in lower extremities, improved  NEURO: Awake and alert           Data:     BMP  Recent Labs   Lab 11/09/23  1123 11/09/23  0525 11/09/23  0355 11/09/23  0003 11/08/23  0755 11/08/23  0546 11/07/23  1025 11/07/23  0527 11/06/23  0727 11/06/23  0557   NA  --  139  --   --   --  " 138  --  139  --  140   POTASSIUM  --  4.1  --   --   --  4.2  --  3.9  --  4.8   CHLORIDE  --  99  --   --   --  97*  --  97*  --  98   TERESA  --  8.2*  --   --   --  8.2*  --  7.9*  --  8.1*   CO2  --  29  --   --   --  27  --  26  --  23   BUN  --  25.5*  --   --   --  55.9*  --  45.2*  --  89.6*   CR  --  3.75*  --   --   --  6.08*  --  4.64*  --  7.18*   * 106* 116* 118*   < > 98   < > 106*   < > 98    < > = values in this interval not displayed.     CBC  Recent Labs   Lab 11/08/23  2342 11/04/23  0409 11/03/23  0458   WBC  --  16.2* 18.5*   HGB  --  8.8* 8.1*   HCT  --  27.7* 25.4*   MCV  --  90 90    187 183     Lab Results   Component Value Date    AST 47 (H) 11/09/2023    ALT 22 11/09/2023    ALKPHOS 105 (H) 11/09/2023    BILITOTAL 0.4 11/09/2023    CHARLETTE 42 10/31/2023     Lab Results   Component Value Date    INR 1.57 (H) 10/27/2023       Attestation:  I have reviewed today's vital signs, notes, medications, labs and imaging.    DO [facility] - Nephrology  Office: [phone]  Cell: [phone]

## 2023-11-09 NOTE — PROGRESS NOTES
[facility]    Hospitalist Progress Note    Interval History   Agreeable for XR feeding tube today. Daughter was able to help this afternoon, but the patient is agreeable for it.  No other acute changes, on room air    Assessment & Plan   Summary: Mya Hui is a 83 year old female with PMH Scoliosis, Lumbar radiculopathy, HTN, HLP, DM2, Hypothyroidism, CKD stage 3, Morbid obesity, GERD, Sliding hiatal hernia, Overactive bladder, OA, Chronic right shoulder pain/rotator cuff arthropathy, Migraines, Asymptomatic carotid artery stenosis who was admitted on 10/25/2023 with an elective T11 to pelvis robotic decompression and fusion.  Surgery was performed on 10/25/23 under general anesthesia.  Surgery was complicated with development of arrhythmia with significant hypotension and noted 3.5 L blood loss per Op Note and Anesthesia postprocedure evaluation note.  Patient received IV fluids, 6 units of pRBCs and 2 units of FFP intra-op.  ST changes were noted intraoperatively.  Patient remained intubated and started on Levophed and was transferred to the ICU due to hemorrhagic shock.  She required vasopressor support with Levophed until 10/27.   Patient remained intubated until the morning of 11/2 and has been on supplemental O2 per Oxymask currently at 4 L/m.   Patient received additional 2 units FFP, 10 units of cryo and 1 unit of PLT. Transferred to hospitalist on 11/3.    Post-op hemorrhagic shock, resolved  Shocked liver, improving  Type 2 MI due to hemorrhagic shock, improved  Surgery was complicated with development of arrhythmia with significant hypotension and noted 3.5 L blood loss per Op Note and Anesthesia postprocedure evaluation note.  Patient received IV fluids, 6 units of pRBCs and 2 units of FFP intra-op. Patient received additional 2 units FFP, 10 units of cryo and 1 unit of PLT while in the ICU. LFTs continue to improve 11/5-11/7.  - Monitor liver function    Encephalopathy,  multifactorial, improving  Incidental finding of left basal ganglia restricted diffusion  Patient was noted to be encephalopathic for which Vascular Neurology was consulted.  Brain MRI was completed and noted a left basal ganglia small focus of restricted diffusion but not a cause for encephalopathy and felt it was likely multifactorial.  EEG monitor was completed and negative and subsequently stopped.  Suspect due to uremic encephalopathy and sepsis.   Encephalopathy improved 11/6  Delirium prevention:   - Reorient patient at each interaction.  - Keep room lights on and blinds open during day (8am-8pm), low lights 8pm-8am.  - Minimize interruptions of sleep at night (consolidate vitals, nursing assessments, medications).  - Avoid sedating medications as able.    - Vascular Neurology recommended repeat brain MRI in 2-3 months.      LISA/ATN requiring hemodialysis  Anion gap metabolic acidosis due to above  CKD stage 3a  Hypoalbuminemia  Anasarca  Baseline creatinine between 1.1-1.4 with GFR in the 40-50's.    Patient had been developed fluid overload and was treated with lasix drip with good output but developed anephric rising creatinine, BUN and developed worsening acidosis.  Due to development of LISA-D Nephrology was consulted and tunnel dialysis catheter placed 10/30 and has been undergoing HD Mon, Wed, Fri (last session was today, 11/3/2023).     Troponin were trended and minimally elevated.  ECHO completed and without any abnormalities (LV normal size and function with EF of 65-70%.  RV normal size, function and structure.  No valve disease).  - Nephrology consultation appreciated/continued  - Continue dialysis  - Avoid nephrotic medications as able.    - Hold PTA lisinopril 40 mg/d    Aspiration/ventilator associated PNA (Enterobacter, stenotrophomonas and E. Coli)  Acute hypoxic respiratory failure (Improving)  Leukocytosis due to above  Extubated 11/2. Patient has also developed aspiration/ventilator  associated pneumonia with sputum Cx growing Enterobacter, stenotrophomonas and E. coli and was initially started on Zoysn and switched to Meropenem on 11/1/2023.  Currently continued on Meropenem and awaiting final cultures. Procal 3. Sputum cultures growing Enterobacter, E coli, Stenotrophomonas.   O2 requirements down to 1L on 11/6  - Continue Levofloxacin, likely 14 day course ending 11/14  - Follow sputum cultures  - Duonebs scheduled  - Wean o2 as able  - RCAT consult requested  - Encourage use of IS/Flutter valve.    - Off IMC today    Dysphagia  Moderate malnutrition  Failed SLP assessment. Bedside feeding tube placement coiling due to hiatal hernia. Note improvement in dysphagia on 11/5, upgraded to pureed diet. Discussed with SLP, hold off tube feed placement while patient's dysphagia is improving, consider if improvement stalls or oral intake remains below target.  - Continue SLP assessments   - No NG tube placement today--pending VFSS  - Transition meds to oral    Scoliosis  Lumbar radiculopathy  S/p T11 to pelvis robotic decompression and fusion  POD# 9.   - Neurosurgery is managing.   - DVT ppx  - Pain control  - Chest PT  - Neuro checks q4h   - PT/OT    HTN  - Continue Diltiazem 60 mg every 6 hours and hydralazine 10 mg every 8 hours  - Hold lisinopril    HLP: Hold PTA pravastatin 40 mg/d.      Type 2 DM, controlled  Stress induced hyperglycemia  - High dose insulin sliding scale ordered.  - Sliding scale insulin  - Hypoglycemic protocol in place.      Hypothyroidism: PTA synthroid pending feeding tube placement    GERD  Sliding hiatal hernia  - PTA PPI pending feeding tube placement    OA  Chronic right shoulder pain/rotator cuff arthropathy  Chronic Pain  - Pain/analgesic management per Neurosurgery.      Obesity   Suspected ANAYELI  Body mass index is 37.64 kg/m .  Increase in all-cause morbidity and mortality.   - Follow up with PCP regarding ongoing management.    - Monitor O2 saturations.    -  "Recommend outpatient sleep study.      Overactive bladder: Hold PTA Ditropan XL 10 mg/d.      Migraines: Pain/analgesic management per Neurosurgery.      Asymptomatic carotid artery stenosis.    NSVT  Plan  - monitor on telemetry with close electrolyte management    Clinically Significant Risk Factors            # Hypomagnesemia: Lowest Mg = 1.6 mg/dL in last 2 days, will replace as needed   # Hypoalbuminemia: Lowest albumin = 2.5 g/dL at 11/1/2023  4:19 AM, will monitor as appropriate     # Hypertension: Noted on problem list        # Obesity: Estimated body mass index is 37.02 kg/m  as calculated from the following:    Height as of this encounter: 1.651 m (5' 5\").    Weight as of this encounter: 100.9 kg (222 lb 7.1 oz).   # Moderate Malnutrition: based on nutrition assessment      # Financial/Environmental Concerns: none          PT/OT: yes  Diet: Adult Formula Drip Feeding: Continuous Novasource Renal; Nasogastric tube; Goal Rate: 40; mL/hr; Once new FT placed and OK to use, resume TF at 20 mL/hr x 12 hours and then advance to goal.  Hold TF 1 hour before and 1 hour after Synthroid adminis...  Combination Diet Clear Liquid Diet; Liquidized/Moderately Thick (level 3) (By tsp only, 1:1 RN assist/supervision, 2-3 hard swallows per tsp, single ice chips, ok for 4 ounces of pudding 3x's/day,hold if aspiration signs noted)    DVT Prophylaxis: Heparin SQ  Spicer Catheter: Not present  Lines: PRESENT      CVC Double Lumen Right Internal jugular Non - valved (open ended);Tunneled-Site Assessment: WDL      Cardiac Monitoring: ACTIVE order. Indication: hypoxia, pneumonia  Code Status: Full Code    Disposition: Anticipated discharge pending improvement in oral intake, possibly to ARU    Yanick Moura DO  Hospitalist Service  [facility]  Securely message with [employer] (more info)  Text page via [employer] Paging/Directory     Data reviewed today: I reviewed all new labs and imaging results over the " last 24 hours.    Physical Exam   Temp: 98.8  F (37.1  C) Temp src: Oral BP: 120/65 Pulse: 77   Resp: 22 SpO2: 95 % O2 Device: Nasal cannula Oxygen Delivery: 2 LPM  Vitals:    11/02/23 0525 11/03/23 0600 11/04/23 0000   Weight: 101.2 kg (223 lb 1.7 oz) 102.6 kg (226 lb 3.1 oz) 100.9 kg (222 lb 7.1 oz)     Vital Signs with Ranges  Temp:  [97.2  F (36.2  C)-98.8  F (37.1  C)] 98.8  F (37.1  C)  Pulse:  [72-89] 77  Resp:  [11-26] 22  BP: (114-157)/(61-92) 120/65  SpO2:  [93 %-99 %] 95 %  I/O last 3 completed shifts:  In: 103 [P.O.:100; I.V.:3]  Out: 3100 [Urine:100; Other:3000]  O2 requirements: no    Constitutional: Elderly female appears ill, intermittent coughing  HEENT: Eyes nonicteric, oral mucosa moist  Cardiovascular: RRR, normal S1/2, no m/r/g  Respiratory: Very coarse with some mild wheezing  Vascular: Trace BLE pitting edema  GI: Normoactive bowel sounds, appears nontender  Skin/Integumen: No rashes  Neuro/Psych: Confused and oriented to self and year, moves all extremities    Medications        - MEDICATION INSTRUCTIONS for Dialysis Patients -   Does not apply See Admin Instructions    alteplase  2 mg Intracatheter Once in dialysis/CRRT    alteplase  2 mg Intracatheter Once in dialysis/CRRT    artificial saliva  2 spray Swish & Spit 4x Daily    B and C vitamin Complex with folic acid  5 mL Per Feeding Tube Daily    diltiazem  60 mg Oral or Feeding Tube Q6H NATACHA    heparin ANTICOAGULANT  5,000 Units Subcutaneous Q8H    hydrALAZINE  10 mg Oral or Feeding Tube Q8H    insulin aspart  1-7 Units Subcutaneous TID AC    insulin aspart  1-5 Units Subcutaneous At Bedtime    levofloxacin  500 mg Intravenous Q48H    levothyroxine  100 mcg Oral or NG Tube QAM AC    nystatin   Topical BID    pantoprazole  40 mg Per Feeding Tube QAM AC    Or    pantoprazole  40 mg Intravenous QAM AC    polyethylene glycol  17 g Oral or NG Tube Daily    protein modular  1 packet Per Feeding Tube Daily    senna-docusate  1 tablet Oral or NG  Tube BID    sodium chloride (PF)  3 mL Intracatheter Q8H    sodium chloride 0.9%  250 mL Intravenous Once in dialysis/CRRT    sodium chloride 0.9%  300 mL Hemodialysis Machine Once    traZODone  25 mg Oral At Bedtime       Data   Recent Labs   Lab 11/09/23  0525 11/09/23  0355 11/09/23  0003 11/08/23  2342 11/08/23  0755 11/08/23  0546 11/07/23  1025 11/07/23  0527 11/04/23  0804 11/04/23  0409 11/03/23  0502 11/03/23  0458   WBC  --   --   --   --   --   --   --   --   --  16.2*  --  18.5*   HGB  --   --   --   --   --   --   --   --   --  8.8*  --  8.1*   MCV  --   --   --   --   --   --   --   --   --  90  --  90   PLT  --   --   --  181  --   --   --   --   --  187  --  183     --   --   --   --  138  --  139   < > 140  --  140   POTASSIUM 4.1  --   --   --   --  4.2  --  3.9   < > 4.4   < > 4.1   CHLORIDE 99  --   --   --   --  97*  --  97*   < > 97*  --  96*   CO2 29  --   --   --   --  27  --  26   < > 22  --  22   BUN 25.5*  --   --   --   --  55.9*  --  45.2*   < > 55.4*  --  90.1*   CR 3.75*  --   --   --   --  6.08*  --  4.64*   < > 4.50*   < > 6.37*   ANIONGAP 11  --   --   --   --  14  --  16*   < > 21*  --  22*   TERESA 8.2*  --   --   --   --  8.2*  --  7.9*   < > 8.1*  --  7.9*   * 116* 118*  --    < > 98   < > 106*   < > 88   < > 95   ALBUMIN 3.0*  --   --   --   --  3.0*  --  3.0*   < > 2.9*  --  2.7*   PROTTOTAL 6.0*  --   --   --   --  5.7*  --  5.7*   < > 5.7*  --  5.1*   BILITOTAL 0.4  --   --   --   --  0.4  --  0.4   < > 0.6  --  0.5   ALKPHOS 105*  --   --   --   --  108*  --  122*   < > 148*  --  141*   ALT 22  --   --   --   --  21  --  23   < > 36  --  38   AST 47*  --   --   --   --  36  --  49*   < > 113*  --  140*    < > = values in this interval not displayed.       Imaging:   No results found for this or any previous visit (from the past 24 hour(s)).

## 2023-11-09 NOTE — PROVIDER NOTIFICATION
"Talked to XR after abd XR resulted \"feeding tube remains coiled in upper portion of the stomach in the hiatal hernia,\" advised to talk to hospitalist regarding use. Ok'd to remove per MD.   "

## 2023-11-09 NOTE — PLAN OF CARE
Goal Outcome Evaluation:   A&O x3, doesn't know exact date. Right arm weakness, both legs weak, able to do coordination on the left arm only due to mentioned weaknesses. VSS. Tele SR with PACs. Dysphagia diet by tsp, moderate thick  liquids. Takes pills crushed with pudding. Up with lift. Tylenol given for back incision pain and sleeping afterward. Feeding tube placed by radiology, not able to advance far enough due to her anatomy, patient to maintain upright position for couple of hrs to help advancement and also due to dye in esophagus per report, reimage x-ray pending, next RN updated. Oliguria.

## 2023-11-09 NOTE — PROGRESS NOTES
[facility]    Neurosurgery  Daily Note    Assessment & Plan   Procedure(s):  Thoracic 11 to pelvis robotic decompression and fusion   15 Days Post-Op  Stapes removed yesterday, steri strips in place. Incision healing well.    Improving daily, complains of lower back pain but denies any radicular leg pain or paraesthesias     Lumbar XR from 10/25/23  IMPRESSION:    Diffuse osteopenia, which somewhat limits evaluation.     Interval [id number] posterior pedicle screw and amber fixation with  interbody disc grafts at L3-L4 and L4-L5 and bilateral sacroiliac  arthrodesis screws. Hardware appears intact without evidence of acute  hardware failure. Skin staples are present.     Similar extent of S-shaped thoracolumbar scoliosis. No significant  spondylolisthesis. Extensive lumbar spondylosis with multilevel disc  space height loss, osteophyte formation, and facet arthropathy.  Thoracic diffuse idiopathic skeletal hyperostosis.     Aortoiliac atherosclerotic calcifications. Prior cholecystectomy.  Barium is present throughout the colon.    Plan:  -Really encouraged therapies and getting up and moving as much as possible. Patient should wear TLSO when up and out of bed. OK to be off when sitting, resting, and hygiene  -Standing XR from, 10/25/23 stable, reviewed with Dr. Mariann GUTIERREZ for DVT ppx, confirmed with Dr. Mariann GUTIERREZ for chest PT, confirmed with Dr. Mariann Dallas spirometer  -Neuro check q4H  -Goal normotension  -Continue supportive and symptomatic treatment  -Pain control measures  -Routine wound care      MYRON GAITAN PA-C    Interval History   Stable     Physical Exam   Temp: 98.6  F (37  C) Temp src: Oral BP: 122/71 Pulse: 75   Resp: 22 SpO2: 96 % O2 Device: Nasal cannula Oxygen Delivery: 2 LPM  Vitals:    11/02/23 0525 11/03/23 0600 11/04/23 0000   Weight: 101.2 kg (223 lb 1.7 oz) 102.6 kg (226 lb 3.1 oz) 100.9 kg (222 lb 7.1 oz)     Vital Signs with Ranges  Temp:  [97.2  F (36.2   C)-98.8  F (37.1  C)] 98.6  F (37  C)  Pulse:  [72-89] 75  Resp:  [20-26] 22  BP: (114-157)/(61-92) 122/71  SpO2:  [93 %-99 %] 96 %  I/O last 3 completed shifts:  In: 103 [P.O.:100; I.V.:3]  Out: 3100 [Urine:100; Other:3000]    Alert and oriented. Following commands. Verbalizing more today than yesterday.  Patient able to lift BLE off bed  PF/DF 5/5 bilaterally   BUE 3+/5   Sensation intact in BLE and BUE    Incision: staples removed, steri strips in place, incision healing well.      Medications       - MEDICATION INSTRUCTIONS for Dialysis Patients -   Does not apply See Admin Instructions    alteplase  2 mg Intracatheter Once in dialysis/CRRT    alteplase  2 mg Intracatheter Once in dialysis/CRRT    artificial saliva  2 spray Swish & Spit 4x Daily    B and C vitamin Complex with folic acid  5 mL Per Feeding Tube Daily    diltiazem  60 mg Oral or Feeding Tube Q6H NATACHA    heparin ANTICOAGULANT  5,000 Units Subcutaneous Q8H    hydrALAZINE  10 mg Oral or Feeding Tube Q8H    insulin aspart  1-7 Units Subcutaneous TID AC    insulin aspart  1-5 Units Subcutaneous At Bedtime    levofloxacin  500 mg Intravenous Q48H    levothyroxine  100 mcg Oral or NG Tube QAM AC    nystatin   Topical BID    pantoprazole  40 mg Per Feeding Tube QAM AC    Or    pantoprazole  40 mg Intravenous QAM AC    polyethylene glycol  17 g Oral or NG Tube Daily    protein modular  1 packet Per Feeding Tube Daily    senna-docusate  1 tablet Oral or NG Tube BID    sodium chloride (PF)  3 mL Intracatheter Q8H    sodium chloride 0.9%  250 mL Intravenous Once in dialysis/CRRT    sodium chloride 0.9%  300 mL Hemodialysis Machine Once    traZODone  25 mg Oral At Bedtime       Plans discussed with Dr. Waite who was in agreement with plans    Divya Johns PA-C  [facility]  [address]  [address] [place] [id number]

## 2023-11-10 ENCOUNTER — APPOINTMENT (OUTPATIENT)
Dept: ULTRASOUND IMAGING | Facility: CLINIC | Age: 83
DRG: 453 | End: 2023-11-10
Attending: HOSPITALIST
Payer: COMMERCIAL

## 2023-11-10 LAB
ALBUMIN SERPL BCG-MCNC: 3 G/DL (ref 3.5–5.2)
ALBUMIN SERPL BCG-MCNC: 3.1 G/DL (ref 3.5–5.2)
ALP SERPL-CCNC: 103 U/L (ref 35–104)
ALP SERPL-CCNC: 104 U/L (ref 35–104)
ALT SERPL W P-5'-P-CCNC: 20 U/L (ref 0–50)
ALT SERPL W P-5'-P-CCNC: 34 U/L (ref 0–50)
ANION GAP SERPL CALCULATED.3IONS-SCNC: 15 MMOL/L (ref 7–15)
AST SERPL W P-5'-P-CCNC: 34 U/L (ref 0–45)
AST SERPL W P-5'-P-CCNC: 35 U/L (ref 0–45)
BILIRUB DIRECT SERPL-MCNC: 0.21 MG/DL (ref 0–0.3)
BILIRUB SERPL-MCNC: 0.4 MG/DL
BILIRUB SERPL-MCNC: 0.4 MG/DL
BUN SERPL-MCNC: 36.1 MG/DL (ref 8–23)
CALCIUM SERPL-MCNC: 8.6 MG/DL (ref 8.8–10.2)
CHLORIDE SERPL-SCNC: 97 MMOL/L (ref 98–107)
CREAT SERPL-MCNC: 5.36 MG/DL (ref 0.51–0.95)
DEPRECATED HCO3 PLAS-SCNC: 26 MMOL/L (ref 22–29)
EGFRCR SERPLBLD CKD-EPI 2021: 8 ML/MIN/1.73M2
GLUCOSE BLDC GLUCOMTR-MCNC: 102 MG/DL (ref 70–99)
GLUCOSE BLDC GLUCOMTR-MCNC: 109 MG/DL (ref 70–99)
GLUCOSE BLDC GLUCOMTR-MCNC: 89 MG/DL (ref 70–99)
GLUCOSE BLDC GLUCOMTR-MCNC: 96 MG/DL (ref 70–99)
GLUCOSE SERPL-MCNC: 104 MG/DL (ref 70–99)
MAGNESIUM SERPL-MCNC: 1.7 MG/DL (ref 1.7–2.3)
PHOSPHATE SERPL-MCNC: 6.1 MG/DL (ref 2.5–4.5)
POTASSIUM SERPL-SCNC: 3.9 MMOL/L (ref 3.4–5.3)
PROT SERPL-MCNC: 6.1 G/DL (ref 6.4–8.3)
PROT SERPL-MCNC: 6.2 G/DL (ref 6.4–8.3)
SODIUM SERPL-SCNC: 138 MMOL/L (ref 135–145)
TRIGL SERPL-MCNC: 167 MG/DL

## 2023-11-10 PROCEDURE — 90935 HEMODIALYSIS ONE EVALUATION: CPT | Performed by: INTERNAL MEDICINE

## 2023-11-10 PROCEDURE — 250N000011 HC RX IP 250 OP 636: Mod: JZ | Performed by: NEUROLOGICAL SURGERY

## 2023-11-10 PROCEDURE — 99233 SBSQ HOSP IP/OBS HIGH 50: CPT | Performed by: HOSPITALIST

## 2023-11-10 PROCEDURE — C9113 INJ PANTOPRAZOLE SODIUM, VIA: HCPCS | Mod: JZ | Performed by: NEUROLOGICAL SURGERY

## 2023-11-10 PROCEDURE — 80076 HEPATIC FUNCTION PANEL: CPT | Performed by: INTERNAL MEDICINE

## 2023-11-10 PROCEDURE — 250N000009 HC RX 250: Mod: JZ | Performed by: HOSPITALIST

## 2023-11-10 PROCEDURE — 83735 ASSAY OF MAGNESIUM: CPT | Performed by: HOSPITALIST

## 2023-11-10 PROCEDURE — 93971 EXTREMITY STUDY: CPT | Mod: RT

## 2023-11-10 PROCEDURE — 84100 ASSAY OF PHOSPHORUS: CPT | Performed by: HOSPITALIST

## 2023-11-10 PROCEDURE — 250N000013 HC RX MED GY IP 250 OP 250 PS 637: Performed by: NURSE PRACTITIONER

## 2023-11-10 PROCEDURE — 250N000013 HC RX MED GY IP 250 OP 250 PS 637: Performed by: INTERNAL MEDICINE

## 2023-11-10 PROCEDURE — 258N000003 HC RX IP 258 OP 636: Performed by: INTERNAL MEDICINE

## 2023-11-10 PROCEDURE — 120N000001 HC R&B MED SURG/OB

## 2023-11-10 PROCEDURE — 90935 HEMODIALYSIS ONE EVALUATION: CPT

## 2023-11-10 PROCEDURE — B4185 PARENTERAL SOL 10 GM LIPIDS: HCPCS | Mod: JZ | Performed by: HOSPITALIST

## 2023-11-10 PROCEDURE — 84478 ASSAY OF TRIGLYCERIDES: CPT | Performed by: HOSPITALIST

## 2023-11-10 PROCEDURE — 99207 PR CDG-CUT & PASTE-POTENTIAL IMPACT ON LEVEL: CPT | Performed by: HOSPITALIST

## 2023-11-10 PROCEDURE — 250N000011 HC RX IP 250 OP 636: Performed by: INTERNAL MEDICINE

## 2023-11-10 PROCEDURE — 250N000011 HC RX IP 250 OP 636: Mod: JZ | Performed by: STUDENT IN AN ORGANIZED HEALTH CARE EDUCATION/TRAINING PROGRAM

## 2023-11-10 PROCEDURE — 90937 HEMODIALYSIS REPEATED EVAL: CPT

## 2023-11-10 RX ORDER — DEXTROSE MONOHYDRATE 100 MG/ML
INJECTION, SOLUTION INTRAVENOUS CONTINUOUS PRN
Status: DISCONTINUED | OUTPATIENT
Start: 2023-11-10 | End: 2023-12-01 | Stop reason: HOSPADM

## 2023-11-10 RX ADMIN — PANTOPRAZOLE SODIUM 40 MG: 40 INJECTION, POWDER, FOR SOLUTION INTRAVENOUS at 12:33

## 2023-11-10 RX ADMIN — SODIUM CHLORIDE, PRESERVATIVE FREE 150 ML: 5 INJECTION INTRAVENOUS at 11:05

## 2023-11-10 RX ADMIN — DILTIAZEM HYDROCHLORIDE 60 MG: 60 TABLET, FILM COATED ORAL at 21:57

## 2023-11-10 RX ADMIN — ASCORBIC ACID, VITAMIN A PALMITATE, CHOLECALCIFEROL, THIAMINE HYDROCHLORIDE, RIBOFLAVIN-5 PHOSPHATE SODIUM, PYRIDOXINE HYDROCHLORIDE, NIACINAMIDE, DEXPANTHENOL, ALPHA-TOCOPHEROL ACETATE, VITAMIN K1, FOLIC ACID, BIOTIN, CYANOCOBALAMIN: 200; 3300; 200; 6; 3.6; 6; 40; 15; 10; 150; 600; 60; 5 INJECTION, SOLUTION INTRAVENOUS at 22:22

## 2023-11-10 RX ADMIN — HYDRALAZINE HYDROCHLORIDE 10 MG: 10 TABLET ORAL at 21:57

## 2023-11-10 RX ADMIN — HYDRALAZINE HYDROCHLORIDE 10 MG: 10 TABLET ORAL at 13:29

## 2023-11-10 RX ADMIN — ACETAMINOPHEN 650 MG: 325 TABLET, FILM COATED ORAL at 17:55

## 2023-11-10 RX ADMIN — DILTIAZEM HYDROCHLORIDE 60 MG: 60 TABLET, FILM COATED ORAL at 12:33

## 2023-11-10 RX ADMIN — SENNOSIDES AND DOCUSATE SODIUM 1 TABLET: 8.6; 5 TABLET ORAL at 21:57

## 2023-11-10 RX ADMIN — Medication 5 ML: at 17:57

## 2023-11-10 RX ADMIN — HEPARIN SODIUM 5000 UNITS: 5000 INJECTION, SOLUTION INTRAVENOUS; SUBCUTANEOUS at 16:08

## 2023-11-10 RX ADMIN — SODIUM CHLORIDE 300 ML: 9 INJECTION, SOLUTION INTRAVENOUS at 11:04

## 2023-11-10 RX ADMIN — LEVOTHYROXINE SODIUM 100 MCG: 100 TABLET ORAL at 12:33

## 2023-11-10 RX ADMIN — Medication 2 SPRAY: at 17:57

## 2023-11-10 RX ADMIN — HEPARIN SODIUM 5000 UNITS: 5000 INJECTION, SOLUTION INTRAVENOUS; SUBCUTANEOUS at 00:31

## 2023-11-10 RX ADMIN — Medication 2 SPRAY: at 12:33

## 2023-11-10 RX ADMIN — SODIUM CHLORIDE, PRESERVATIVE FREE 250 ML: 5 INJECTION INTRAVENOUS at 10:58

## 2023-11-10 RX ADMIN — ALTEPLASE 2 MG: 2.2 INJECTION, POWDER, LYOPHILIZED, FOR SOLUTION INTRAVENOUS at 06:30

## 2023-11-10 RX ADMIN — OLIVE OIL AND SOYBEAN OIL 250 ML: 16; 4 INJECTION, EMULSION INTRAVENOUS at 22:21

## 2023-11-10 RX ADMIN — DILTIAZEM HYDROCHLORIDE 60 MG: 60 TABLET, FILM COATED ORAL at 17:57

## 2023-11-10 RX ADMIN — POLYETHYLENE GLYCOL 3350 17 G: 17 POWDER, FOR SOLUTION ORAL at 12:38

## 2023-11-10 RX ADMIN — TRAZODONE HYDROCHLORIDE 25 MG: 50 TABLET ORAL at 21:57

## 2023-11-10 RX ADMIN — HYDRALAZINE HYDROCHLORIDE 10 MG: 10 TABLET ORAL at 05:37

## 2023-11-10 RX ADMIN — DILTIAZEM HYDROCHLORIDE 60 MG: 60 TABLET, FILM COATED ORAL at 05:37

## 2023-11-10 ASSESSMENT — ACTIVITIES OF DAILY LIVING (ADL)
ADLS_ACUITY_SCORE: 35
ADLS_ACUITY_SCORE: 38
ADLS_ACUITY_SCORE: 40
ADLS_ACUITY_SCORE: 38
ADLS_ACUITY_SCORE: 35
ADLS_ACUITY_SCORE: 40
ADLS_ACUITY_SCORE: 40

## 2023-11-10 NOTE — CONSULTS
CLINICAL NUTRITION SERVICES - REASSESSMENT NOTE      RECOMMENDATIONS FOR MD/PROVIDER TO ORDER:   Nutrition POC per MD. Recommend using PPN as bridge to TF (ideal- if able to re-place FT and pending GOC) or TPN (if central access obtained, pending GOC) as PPN is only indicated for <2 weeks     Recommendations Ordered by Registered Dietitian (RD):   Discussed nutrition POC w/ pt's family member. Plans for nutrition via IV. Encouraged PO intake, offered ONS ok w/ current diet  Ordered cherry Gel+ w/ meals TID     Ordered updated wt  Kcal ct to start today per MD order (11/10-12)  Discontinued TF + protein modulars    Ordered PPN    Type of Access: Midline  Parenteral Frequency: Continuous  Parenteral Regimen: Clinimix E (5/4.25) @ 83 mL/hr + 250 mL 20% lipids daily   Total Parenteral Provisions: 1992 mL, 1180 kcal (17 kcal/kg), 100 g CHO (GIR = 1.03), 85 g AA (1.3 g/kg protein), and 42% kcal from fat     Malnutrition:   % Weight Loss:  None noted (11/6)  % Intake:  </= 50% for >/= 5 days (severe malnutrition) (10/28)-- ongoing  Subcutaneous Fat Loss:  None observed (10/28)  Muscle Loss:  None observed (10/28)  Fluid Retention:  Trace to moderate generalized edema     Malnutrition Diagnosis: Moderate malnutrition in the context of --  Acute illness or injury       Consult received for: Pharmacy/Nutrition to Start and Manage PN   Line Type: Midline Peripheral Catheter - REQUIRES peripheral formula   Indication for Parenteral Nutrition Not tolerating enteral nutrition       EVALUATION OF PROGRESS TOWARD GOALS   Diet: Clear Liquid Diet; Liquidized/Moderately Thick (level 3)     11/5: Pureed diet (L4) w/ mildly thick liquids (L2)  11/7: VFSS = Mod-severe oral-pharyngeal dysphagia --> Recommend diet downgrade to clear liquid moderately thick liquids   11/9: SLP = Gradual progress in Tx. Rec: cautious po including Clear Liquid Diet; Liquidized/Moderately Thick (level 3)   11/7-present: CLD, moderately thick  (L3)    Intake/Tolerance:  Visited pt at bedside this afternoon. Pt sleeping, family member present. Reviewed nutrition POC- plan for IV nutrition. Discussed that IV nutrition will provide kcal, protein, fats- will not meet needs but will provide some nutrition. Encouraged PO intake too. Pt has not been eating much. Offered clear ONS- pt likes cherry per family member.   Poor intake per chart review. Pt is total feed  Pt has received 0-1 CL meals/day recently per health [facility]. Bites/sips-100% of pudding, apple sauce per nursing flow sheet.    Calorie Counts ordered yesterday afternoon by Dr. Moura. To start today       Nutrition Support:   Enteral   11/2: TF discontinued w/ extubation  11/3: TF orders placed, FT to be replaced  11/4: bedside FT placement --> coiled. IR unable to place FT over weekend  11/6: hospitalist note = SLP rec hold on FT placement as dysphagia is improving   11/7: hospitalist note = poor PO intake. Notable dysphagia from VFSS. SLP rec FT placement  11/8: pt refused FT placement --> later pt agreeable   11/9: IR = feeding tube is advanced with the tip in the portion of the stomach above the diaphragm, the feeding tube could not be advanced through the neck of the hiatal hernia.   11/9: abd XR  = Feeding tube remains coiled in the upper portion of the stomach in the hiatal hernia. --> later removed per MD     Parenteral  RD, pharmacist consulted for PPN to start today   D/w pharmacist- will start w/ Clinimix and monitor labs/renal fx. May need to switch to custom pending labs/renal fx.       ASSESSED NUTRITION NEEDS: (11/6)  Dosing Weight:  67.9 kg (adjusted, based on 101.2 kg-- 11/2)  Estimated Energy Needs: 6261-1825 kcals (25-30 Kcal/Kg)  Justification: maintenance  Estimated Protein Needs:  grams protein (1.2-1.8 g pro/Kg)  Justification: dialysis  Estimated Fluid Needs: per provider pending fluid status      NEW FINDINGS:   General:   Mentation improving, per hospitalist note  "yesterday = \"family indicates her encephalopathy is improving, but remains highly fluctuating.\"  MWF dialysis runs  Needs TCU placement when medically ready     Weight: last wt 11/4, unable to assess recent wt trends. Ordered updated wt. Wt appears stable throughout admit 10/26-11/4    I/O: Net IO Since Admission: 752.57 mL [11/10/23 1116].  Last BM x1 yesterday, 3x on 11/7    Labs: reviewed   Component Value Date    BUN 25.5 (H) 11/09/2023    CR 3.75 (H) 11/09/2023    MAG 1.6 (L) 11/09/2023    PHOS 4.7 (H) 11/09/2023     Lab 11/10/23  0225 11/09/23  2203 11/09/23  1603 11/09/23  1123 11/09/23  0525 11/09/23  0355   GLC 96 106* 117* 129* 106* 116*       Medications: reviewed    B and C vitamin Complex with folic acid  5 mL Per Feeding Tube Daily    insulin aspart  1-7 Units Subcutaneous TID AC    insulin aspart  1-5 Units Subcutaneous At Bedtime    levofloxacin  500 mg Intravenous Q48H    levothyroxine  100 mcg Oral or NG Tube QAM AC    pantoprazole  40 mg Intravenous QAM AC    polyethylene glycol  17 g Oral or NG Tube Daily    senna-docusate  1 tablet Oral or NG Tube BID     Skin: WOC following, assessed 11/8  Wound location: right groin right abd fold   Wound due to: Moisture Associated Skin Damage (MASD) moisture split    STATUS: initial assessment         Previous Goals:   Re-initiation of EN within 24-48 hours vs increase in intakes >50% BID-TID meals   Evaluation: Not met    Previous Nutrition Diagnosis:   Inadequate protein-energy intake related to NPO status as evidenced by inconsistent administration of TF regimen d/t procedures, OG tube removal, and </=50% of intake for >/= 5 days.    Evaluation: Improving        MALNUTRITION  % Weight Loss:  None noted (11/6)  % Intake:  </= 50% for >/= 5 days (severe malnutrition) (10/28)-- ongoing  Subcutaneous Fat Loss:  None observed (10/28)  Muscle Loss:  None observed (10/28)  Fluid Retention:  Trace to moderate generalized edema     Malnutrition Diagnosis: Moderate " malnutrition in the context of --  Acute illness or injury        CURRENT NUTRITION DIAGNOSIS  Inadequate protein-energy intake related to diet restriction, difficulty re-starting TF as evidenced by TF off x7 days, minimal PO intake w/ moderately thickened CLD, plans to start PPN        INTERVENTIONS  Recommendations / Nutrition Prescription  Discussed nutrition POC w/ pt's family member. Plans for nutrition via IV. Encouraged PO intake, offered ONS ok w/ current diet  Ordered cherry Gel+ w/ meals TID   Ordered updated wt  Kcal ct to start today per MD order (11/10-12)  Discontinued TF + protein modulars  Ordered PPN      Implementation  Collaboration with other nutrition professionals  Collaboration with other providers  Enteral Nutrition - discontinued   Feeding tube flush  Medical food supplement therapy  Parenteral Nutrition/IV Fluids - Initiate  Wt order    Goals  PO intake + PPN to meet % of estimated nutrition needs  Nutrition POC be determined w/in 72 hours      MONITORING AND EVALUATION:  Progress towards goals will be monitored and evaluated per protocol and Practice Guidelines      Nora Ferris RD, LD  Pager: [phone]

## 2023-11-10 NOTE — PROGRESS NOTES
Orientation: Aox4, RA  Vitals: VSS  Tele: NSR  IV Access/drains: R. Upper hand Midline, R. PIV SL, RUE dialysis port.  Diet: Clear Liquid Diet; moderately Thick (level 3)   Mobility: Ax2, Q2hrs. T/R  GI/: Incontinent B/B  Wound/Skin: J.P incision side CD with merplix. Right thigh solid bruise with black spot in the middle.  Discharge Plan: pending      See Flow sheets for assessment

## 2023-11-10 NOTE — PROGRESS NOTES
Potassium   Date Value Ref Range Status   11/09/2023 4.1 3.4 - 5.3 mmol/L Final   05/13/2022 4.5 3.4 - 5.3 mmol/L Final   01/06/2021 4.3 3.4 - 5.3 mmol/L Final     Potassium POCT   Date Value Ref Range Status   10/25/2023 4.7 3.4 - 5.3 mmol/L Final     Comment:     000     Hemoglobin   Date Value Ref Range Status   11/04/2023 8.8 (L) 11.7 - 15.7 g/dL Final   09/23/2020 13.1 11.7 - 15.7 g/dL Final     Creatinine   Date Value Ref Range Status   11/09/2023 3.75 (H) 0.51 - 0.95 mg/dL Final   01/06/2021 1.04 0.52 - 1.04 mg/dL Final     Urea Nitrogen   Date Value Ref Range Status   11/09/2023 25.5 (H) 8.0 - 23.0 mg/dL Final   05/13/2022 17 7 - 30 mg/dL Final   01/06/2021 21 7 - 30 mg/dL Final     Sodium   Date Value Ref Range Status   11/09/2023 139 135 - 145 mmol/L Final     Comment:     Reference intervals for this test were updated on 09/26/2023 to more accurately reflect our healthy population. There may be differences in the flagging of prior results with similar values performed with this method. Interpretation of those prior results can be made in the context of the updated reference intervals.    01/06/2021 144 133 - 144 mmol/L Final     INR   Date Value Ref Range Status   10/27/2023 1.57 (H) 0.85 - 1.15 Final       DIALYSIS PROCEDURE NOTE  Hepatitis status of previous patient on machine log was checked and verified ok to use with this patients hepatitis status.  Patient dialyzed for 3.5 hrs. on a K3 bath with a net fluid removal of 900 ml.  A BFR of 300 ml/min was obtained via a Right CVC.   The treatment plan was discussed with Dr. Pike during the treatment.    Total heparin received during the treatment: 0 units.    Line flushed, clamped and capped with heparin 1:1000 1.6 mL (1600 units) per lumen.    Meds  given: NS Bolus 400 ml.  Complications: Symptomatic hypotension (lightheadedness).  Symptoms resolved post Saline bolus received.  BP improved as UF rate turned OFF with decreased BFR.    Person educated:  patient. Knowledge base minimal. Barriers to learning: gayathri FLOWER. Educated on procedure via verbal mode.  Patient verbalized understanding.      ICEBOAT? Timeout performed pre-treatment  I: Patient was identified using 2 identifiers  C:  Consent Signed Yes  E: Equipment preventative maintenance is current and dialysis delivery system OK to use  B: Hepatitis B Surface Antigen: Negative; Draw Date: 10/30/2023.      Hepatitis B Surface Antibody: Immune (15.68 international unit(s)/ml); Draw Date: 10/30/2023.  O: Dialysis orders present and complete prior to treatment  A: Vascular access verified and assessed prior to treatment  T: Treatment was performed at a clinically appropriate time  ?: Patient was allowed to ask questions and address concerns prior to treatment  See Adult Hemodialysis flowsheet in [employer] for further details and post assessment.  Machine water alarm in place and functioning. Transducer pods intact and checked every 15min.   Pt returned via bed.  Chlorine/Chloramine water system checked every 4 hours.  Outpatient Dialysis TBD.    Patient repositioned every 2 hours during the treatment.  Post treatment report given to Leah Severson, RN regarding 900 ML of fluid removed, last BP of 143/80, and patient pain rating of 0/10.       Andrés Cuevas RN  [facility]

## 2023-11-10 NOTE — PROGRESS NOTES
"[facility]    Neurosurgery Progress Note    Date of Service (when I saw the patient): 11/10/2023     Assessment & Plan     Procedure(s):  Thoracic 11 to pelvis robotic decompression and fusion   -16 Days Post-Op  Patient seen lying in bed. Patient reports she is doing \"okay\" today. Incision healing well. Patient had dialysis this AM. Denies any radicular leg pain or paresthesias. Denies new or worsening leg pain/back pain/weakness.     Plan:  - TLSO when out of bed, OK to be OFF with sitting, resting and hygiene   -OK for DVT ppx, confirmed with Dr. Waite  -OK for chest PT, confirmed with Dr. Waite  -Incentive spirometer  -Neuro check q4H  -Goal normotension  -Continue supportive and symptomatic treatment  -Pain control measures  -Routine wound care  - PT/OT     Tami Cheema PA-C  [facility] Neurosurgery  [facility]  [address]  [address] [place] [address]      Interval History   Stable    Physical Exam   Temp: 98.5  F (36.9  C) Temp src: Oral BP: 139/78 Pulse: 80   Resp: 18 SpO2: 95 % O2 Device: None (Room air)    Vitals:    11/02/23 0525 11/03/23 0600 11/04/23 0000   Weight: 101.2 kg (223 lb 1.7 oz) 102.6 kg (226 lb 3.1 oz) 100.9 kg (222 lb 7.1 oz)     Vital Signs with Ranges  Temp:  [97.6  F (36.4  C)-99.1  F (37.3  C)] 98.5  F (36.9  C)  Pulse:  [64-86] 80  Resp:  [14-25] 18  BP: ()/() 139/78  SpO2:  [90 %-97 %] 95 %  I/O last 3 completed shifts:  In: 280 [P.O.:280]  Out: -      , Blood pressure 139/78, pulse 80, temperature 98.5  F (36.9  C), temperature source Oral, resp. rate 18, height 1.651 m (5' 5\"), weight 100.9 kg (222 lb 7.1 oz), SpO2 95%.  222 lbs 7.11 oz  NEUROLOGICAL EXAMINATION:   Mental status:  Alert and Oriented, able to follow commands, speech is fluent. NAd  Motor:   Able to lift BLE off bed  Able to lift BUE spontaneously   Sensation:  intact BLE/BUE  Incision: Steri strips in place, healing well   "

## 2023-11-10 NOTE — PROGRESS NOTES
Pt. Alert and oriented x4. Vital signs stable on RA. Assist of 2 to repo q2h and more frequently due to discomfort. Tolerating clear liquid diet, mod thick liquids. Pt ate jello, chicken broth and popsicle this evening. Removed NG tube per MD, abd XR showed coiling in hiatal hernia. Lung sounds dim, freq cough. Bowel sounds active, + flatus. BM 11/9, oliguric. Midline back incision CDI with steri strips, ELY drain removal site with mepilex intact. R thigh bruise hardened with black blood blister looking area. Pain managed with Oxy and Tylenol. Denies nausea. Tele SR with PACs.

## 2023-11-10 NOTE — PROGRESS NOTES
[facility]    Hospitalist Progress Note    Interval History   Not able to place feeding tube, again was not able to pass the hiatal hernia.    Tolerating some oral intake but with significant coughing    Assessment & Plan   Summary: Mya Hui is a 83 year old female with PMH Scoliosis, Lumbar radiculopathy, HTN, HLP, DM2, Hypothyroidism, CKD stage 3, Morbid obesity, GERD, Sliding hiatal hernia, Overactive bladder, OA, Chronic right shoulder pain/rotator cuff arthropathy, Migraines, Asymptomatic carotid artery stenosis who was admitted on 10/25/2023 with an elective T11 to pelvis robotic decompression and fusion.  Surgery was performed on 10/25/23 under general anesthesia.  Surgery was complicated with development of arrhythmia with significant hypotension and noted 3.5 L blood loss per Op Note and Anesthesia postprocedure evaluation note.  Patient received IV fluids, 6 units of pRBCs and 2 units of FFP intra-op.  ST changes were noted intraoperatively.  Patient remained intubated and started on Levophed and was transferred to the ICU due to hemorrhagic shock.  She required vasopressor support with Levophed until 10/27.   Patient remained intubated until the morning of 11/2 and has been on supplemental O2 per Oxymask currently at 4 L/m.   Patient received additional 2 units FFP, 10 units of cryo and 1 unit of PLT. Transferred to hospitalist on 11/3.    Post-op hemorrhagic shock, resolved  Shocked liver, improving  Type 2 MI due to hemorrhagic shock, improved  Surgery was complicated with development of arrhythmia with significant hypotension and noted 3.5 L blood loss per Op Note and Anesthesia postprocedure evaluation note.  Patient received IV fluids, 6 units of pRBCs and 2 units of FFP intra-op. Patient received additional 2 units FFP, 10 units of cryo and 1 unit of PLT while in the ICU. LFTs continue to improve 11/5-11/7.  - Monitor liver function    Encephalopathy,  multifactorial, improving  Incidental finding of left basal ganglia restricted diffusion  Patient was noted to be encephalopathic for which Vascular Neurology was consulted.  Brain MRI was completed and noted a left basal ganglia small focus of restricted diffusion but not a cause for encephalopathy and felt it was likely multifactorial.  EEG monitor was completed and negative and subsequently stopped.  Suspect due to uremic encephalopathy and sepsis.   Encephalopathy improved 11/6  Delirium prevention:   - Reorient patient at each interaction.  - Keep room lights on and blinds open during day (8am-8pm), low lights 8pm-8am.  - Minimize interruptions of sleep at night (consolidate vitals, nursing assessments, medications).  - Avoid sedating medications as able.    - Vascular Neurology recommended repeat brain MRI in 2-3 months.      LISA/ATN requiring hemodialysis  Anion gap metabolic acidosis due to above  CKD stage 3a  Hypoalbuminemia  Anasarca  Baseline creatinine between 1.1-1.4 with GFR in the 40-50's.    Patient had been developed fluid overload and was treated with lasix drip with good output but developed anephric rising creatinine, BUN and developed worsening acidosis.  Due to development of LISA-D Nephrology was consulted and tunnel dialysis catheter placed 10/30 and has been undergoing HD Mon, Wed, Fri (last session was today, 11/3/2023).     Troponin were trended and minimally elevated.  ECHO completed and without any abnormalities (LV normal size and function with EF of 65-70%.  RV normal size, function and structure.  No valve disease).  - Nephrology consultation appreciated/continued  - Continue dialysis  - Avoid nephrotic medications as able.    - Hold PTA lisinopril 40 mg/d    Aspiration/ventilator associated PNA (Enterobacter, stenotrophomonas and E. Coli)  Acute hypoxic respiratory failure (Improving)  Leukocytosis due to above  Extubated 11/2. Patient has also developed aspiration/ventilator  associated pneumonia with sputum Cx growing Enterobacter, stenotrophomonas and E. coli and was initially started on Zoysn and switched to Meropenem on 11/1/2023.  Currently continued on Meropenem and awaiting final cultures. Procal 3. Sputum cultures growing Enterobacter, E coli, Stenotrophomonas.   O2 requirements down to 1L on 11/6  - Continue Levofloxacin, likely 14 day course ending 11/14  - Follow sputum cultures  - Duonebs scheduled  - Wean o2 as able  - RCAT consult requested  - Encourage use of IS/Flutter valve.    - Off IMC today    Dysphagia  Moderate malnutrition  Failed SLP assessment. Bedside feeding tube placement coiling due to hiatal hernia. Note improvement in dysphagia on 11/5, upgraded to pureed diet. Discussed with SLP, hold off tube feed placement while patient's dysphagia is improving, consider if improvement stalls or oral intake remains below target.  - Continue SLP assessments  - Transition meds to oral  - clear diet for now, advance as tolerates  - given inability to place feeding tube, will start peripheral nutrition via midline. Family and patient are both hopeful to avoid a a PEG tube. May end up needing TPN or PEG    Scoliosis  Lumbar radiculopathy  S/p T11 to pelvis robotic decompression and fusion  POD# 9.   - Neurosurgery is managing.   - DVT ppx  - Pain control  - Chest PT  - Neuro checks q4h   - PT/OT    HTN  - Continue Diltiazem 60 mg every 6 hours and hydralazine 10 mg every 8 hours  - Hold lisinopril    HLP: Hold PTA pravastatin 40 mg/d.      Type 2 DM, controlled  Stress induced hyperglycemia  - High dose insulin sliding scale ordered.  - Sliding scale insulin  - Hypoglycemic protocol in place.      Hypothyroidism: PTA synthroid pending feeding tube placement    GERD  Sliding hiatal hernia  - PTA PPI pending feeding tube placement    OA  Chronic right shoulder pain/rotator cuff arthropathy  Chronic Pain  - Pain/analgesic management per Neurosurgery.      Obesity   Suspected  "ANAYELI  Body mass index is 37.64 kg/m .  Increase in all-cause morbidity and mortality.   - Follow up with PCP regarding ongoing management.    - Monitor O2 saturations.    - Recommend outpatient sleep study.      Overactive bladder: Hold PTA Ditropan XL 10 mg/d.      Migraines: Pain/analgesic management per Neurosurgery.      Asymptomatic carotid artery stenosis.    NSVT  Plan  - monitor on telemetry with close electrolyte management    Clinically Significant Risk Factors            # Hypomagnesemia: Lowest Mg = 1.6 mg/dL in last 2 days, will replace as needed   # Hypoalbuminemia: Lowest albumin = 2.5 g/dL at 11/1/2023  4:19 AM, will monitor as appropriate     # Hypertension: Noted on problem list        # Obesity: Estimated body mass index is 37.02 kg/m  as calculated from the following:    Height as of this encounter: 1.651 m (5' 5\").    Weight as of this encounter: 100.9 kg (222 lb 7.1 oz).   # Moderate Malnutrition: based on nutrition assessment      # Financial/Environmental Concerns: none          PT/OT: yes  Diet: Combination Diet Clear Liquid Diet; Liquidized/Moderately Thick (level 3) (By tsp only, 1:1 RN assist/supervision, 2-3 hard swallows per tsp, alternate textures, single ice chips, ok for 4 ounces of pudding 3x's/day, hold if respiratory status dec...  Calorie Counts  parenteral nutrition - Clinimix E    DVT Prophylaxis: Heparin SQ  Spicer Catheter: Not present  Lines: PRESENT      CVC Double Lumen Right Internal jugular Non - valved (open ended);Tunneled-Site Assessment: WDL      Cardiac Monitoring: ACTIVE order. Indication: hypoxia, pneumonia  Code Status: Full Code    Disposition: Anticipated discharge pending improvement in oral intake, possibly to LTACH    Yanick Moura DO  Hospitalist Service  [facility]  Securely message with [email or web address] (more info)  Text page via [employer] Paging/Directory     Data reviewed today: I reviewed all new labs and imaging results over the last " 24 hours.    Physical Exam   Temp: 98.5  F (36.9  C) Temp src: Oral BP: 139/78 Pulse: 80   Resp: 18 SpO2: 95 % O2 Device: None (Room air)    Vitals:    11/02/23 0525 11/03/23 0600 11/04/23 0000   Weight: 101.2 kg (223 lb 1.7 oz) 102.6 kg (226 lb 3.1 oz) 100.9 kg (222 lb 7.1 oz)     Vital Signs with Ranges  Temp:  [97.6  F (36.4  C)-99.1  F (37.3  C)] 98.5  F (36.9  C)  Pulse:  [64-86] 80  Resp:  [14-25] 18  BP: ()/() 139/78  SpO2:  [90 %-97 %] 95 %  I/O last 3 completed shifts:  In: 280 [P.O.:280]  Out: -   O2 requirements: no    Constitutional: Elderly female appears ill, intermittent coughing  HEENT: Eyes nonicteric, oral mucosa moist  Cardiovascular: RRR, normal S1/2, no m/r/g  Respiratory: Very coarse with some mild wheezing  Vascular: Trace BLE pitting edema  GI: Normoactive bowel sounds, appears nontender  Skin/Integumen: No rashes  Neuro/Psych: Confused and oriented to self and year, moves all extremities    Medications    dextrose      parenteral nutrition - Clinimix E          - MEDICATION INSTRUCTIONS for Dialysis Patients -   Does not apply See Admin Instructions    alteplase  2 mg Intracatheter Once in dialysis/CRRT    alteplase  2 mg Intracatheter Once in dialysis/CRRT    artificial saliva  2 spray Swish & Spit 4x Daily    B and C vitamin Complex with folic acid  5 mL Per Feeding Tube Daily    diltiazem  60 mg Oral or Feeding Tube Q6H NATACHA    heparin ANTICOAGULANT  5,000 Units Subcutaneous Q8H    hydrALAZINE  10 mg Oral or Feeding Tube Q8H    insulin aspart  1-6 Units Subcutaneous Q4H    levofloxacin  500 mg Intravenous Q48H    levothyroxine  100 mcg Oral or NG Tube QAM AC    lipids plant base  250 mL Intravenous Q24H    - MEDICATION INSTRUCTIONS -   Does not apply Once    nystatin   Topical BID    pantoprazole  40 mg Per Feeding Tube QAM AC    Or    pantoprazole  40 mg Intravenous QAM AC    polyethylene glycol  17 g Oral or NG Tube Daily    senna-docusate  1 tablet Oral or NG Tube BID     sodium chloride (PF)  3 mL Intracatheter Q8H    traZODone  25 mg Oral At Bedtime       Data   Recent Labs   Lab 11/10/23  1236 11/10/23  0705 11/10/23  0225 11/09/23  1123 11/09/23  0525 11/09/23  0003 11/08/23  2342 11/08/23  0755 11/08/23  0546 11/04/23  0804 11/04/23  0409   WBC  --   --   --   --   --   --   --   --   --   --  16.2*   HGB  --   --   --   --   --   --   --   --   --   --  8.8*   MCV  --   --   --   --   --   --   --   --   --   --  90   PLT  --   --   --   --   --   --  181  --   --   --  187   NA  --  138  --   --  139  --   --   --  138   < > 140   POTASSIUM  --  3.9  --   --  4.1  --   --   --  4.2   < > 4.4   CHLORIDE  --  97*  --   --  99  --   --   --  97*   < > 97*   CO2  --  26  --   --  29  --   --   --  27   < > 22   BUN  --  36.1*  --   --  25.5*  --   --   --  55.9*   < > 55.4*   CR  --  5.36*  --   --  3.75*  --   --   --  6.08*   < > 4.50*   ANIONGAP  --  15  --   --  11  --   --   --  14   < > 21*   TERESA  --  8.6*  --   --  8.2*  --   --   --  8.2*   < > 8.1*   GLC 89 104* 96   < > 106*   < >  --    < > 98   < > 88   ALBUMIN  --  3.1*  3.0*  --   --  3.0*  --   --   --  3.0*   < > 2.9*   PROTTOTAL  --  6.2*  6.1*  --   --  6.0*  --   --   --  5.7*   < > 5.7*   BILITOTAL  --  0.4  0.4  --   --  0.4  --   --   --  0.4   < > 0.6   ALKPHOS  --  103  104  --   --  105*  --   --   --  108*   < > 148*   ALT  --  34  20  --   --  22  --   --   --  21   < > 36   AST  --  35  34  --   --  47*  --   --   --  36   < > 113*    < > = values in this interval not displayed.       Imaging:   Recent Results (from the past 24 hour(s))   XR Abdomen Port 1 View    Narrative    ABDOMEN ONE VIEW PORTABLE  11/9/2023 3:47 PM     HISTORY: Check feeding tube advancement.    COMPARISON: November 2, 2023       Impression    IMPRESSION: Feeding tube remains coiled in the upper portion of the  stomach in the hiatal hernia.    JORGE GARCIA MD         SYSTEM ID:  [id number]

## 2023-11-10 NOTE — PLAN OF CARE
Problem: Malnutrition  Goal: Improved Nutritional Intake  Outcome: Progressing  Intervention: Promote and Optimize Oral Intake  Recent Flowsheet Documentation  Taken 11/10/2023 1237 by Nora Ferris  Nutrition Interventions:   supplemental foods provided   other (see comments)   Goal Outcome Evaluation:    CLD, moderately thick liquids. Poor intake. Kcal ct to start per MD. RODRIGUEZ ordered ONS. PPN to start today    Nora Ferris RD, LD

## 2023-11-10 NOTE — PLAN OF CARE
Here for T11 to pelvic decompression and fusion with complicated post op course. A&O x 3, forgetful, flat. Neuros intact x RUE weakness at baseline and mild word finding difficulty. VSS on RA. Ax2 w lift, turn/repo frequently. Tylenol given. Dialysis today. Tele: NSR. Tolerating clear moderately thick liquid diet. Calorie count completed. Takes pills crushed. Alarms on. Plan to start parental nutrition tonight.

## 2023-11-10 NOTE — ED PROVIDER NOTES
MD Notification    Notified Person: MD    Notified Person Name: Cirilo    Notification Date/Time: 6:06 am    Notification Interaction: paging    Purpose of Notification:  Midline clogged, could you put an order for alteplase? Thanks!     Orders Received:    Comments:

## 2023-11-10 NOTE — PROGRESS NOTES
Renal Medicine Progress Note            Assessment/Plan:     Assessment: Mya Hui is a 83-year-old woman with PMH DM2, hypothyroidism, HLD, HTN admitted 10/25/2023 due to hemorrhagic shock after lumbar spinal fusion.  Course complicated by LISA and encephalopathy.     LISA-D  CKD IIIA  Anasarca  Hypoalbuminemia  Baseline creatinine 1.1-1.4, EGFR 45-50.  No history of appreciable proteinuria.  Worsening creatinine in setting of hemorrhagic shock, most currently has ATN as a result.  Has developed fluid overload, anuria and then inititaed dialysis.     -Tunneled dialysis catheter placed by IR on 10/30  HD MWF. Remains oliguric. Working on improving volume status. No weights in last week but tolerating ultrafiltration well and improving edema.         Hemorrhagic shock, resolved  S/p lumbar fusion  Shock liver, improved  Underwent lumbar fusion on 10/25, had 2 L blood loss during surgery.  Developed hemorrhagic shock thereafter.  Multiple transfusions and pressors, currently not requiring pressors.  ELY drains still in place.     Enterobacter PNA   Sputum cx with enterobacter. Zosyn switched to meropenem 11/1, no levo.         Plan/Recs:  1) dialysis today per [facility], schedule while monitoring for renal recovery.   2) continue daily BMPs, avoidance of nephrotoxins.  3) plan for transition to outpatient dialysis upon discharge, appears [facility]   Will call in orders closer to discharge.     Alfredo Spain DO  [facility] consultants  Office: [phone]  Cell: [phone]        Interval History:     Pt seen on dialysis. She reports her pain in better. Remains oliguric, 100cc urine output yesterday. Vitals stable, no needing oxygen.           Medications and Allergies:      - MEDICATION INSTRUCTIONS for Dialysis Patients -   Does not apply See Admin Instructions    alteplase  2 mg Intravenous Q2H    alteplase  2 mg Intracatheter Once in dialysis/CRRT    alteplase  2 mg Intracatheter Once in dialysis/CRRT  "   artificial saliva  2 spray Swish & Spit 4x Daily    B and C vitamin Complex with folic acid  5 mL Per Feeding Tube Daily    diltiazem  60 mg Oral or Feeding Tube Q6H NATACHA    heparin ANTICOAGULANT  5,000 Units Subcutaneous Q8H    hydrALAZINE  10 mg Oral or Feeding Tube Q8H    insulin aspart  1-7 Units Subcutaneous TID AC    insulin aspart  1-5 Units Subcutaneous At Bedtime    levofloxacin  500 mg Intravenous Q48H    levothyroxine  100 mcg Oral or NG Tube QAM AC    - MEDICATION INSTRUCTIONS -   Does not apply Once    nystatin   Topical BID    pantoprazole  40 mg Per Feeding Tube QAM AC    Or    pantoprazole  40 mg Intravenous QAM AC    polyethylene glycol  17 g Oral or NG Tube Daily    protein modular  1 packet Per Feeding Tube Daily    senna-docusate  1 tablet Oral or NG Tube BID    sodium chloride (PF)  3 mL Intracatheter Q8H    sodium chloride 0.9%  250 mL Intravenous Once in dialysis/CRRT    sodium chloride 0.9%  300 mL Hemodialysis Machine Once    traZODone  25 mg Oral At Bedtime        Allergies   Allergen Reactions    Fentanyl Nausea and Vomiting     VERY sensitive to most narcotics if not all.    Morphine And Related Nausea            Physical Exam:   Vitals were reviewed  /74   Pulse 72   Temp 98  F (36.7  C) (Oral)   Resp 19   Ht 1.651 m (5' 5\")   Wt 100.9 kg (222 lb 7.1 oz)   LMP  (LMP Unknown)   SpO2 95%   BMI 37.02 kg/m      Wt Readings from Last 3 Encounters:   11/04/23 100.9 kg (222 lb 7.1 oz)   10/19/23 95 kg (209 lb 8 oz)   09/28/23 97.1 kg (214 lb)       Intake/Output Summary (Last 24 hours) at 11/10/2023 0945  Last data filed at 11/9/2023 1800  Gross per 24 hour   Intake 280 ml   Output --   Net 280 ml       GENERAL APPEARANCE: NAD  HEENT: Dry mucous membranes  RESP: clear ant/lat b/.  CV: RRR, no murmur  ABDOMEN: soft, nontender  EXTREMITIES/SKIN: 1+ in lower extremities, improved  NEURO: Awake and alert  Right CVC unremarkable           Data:     BMP  Recent Labs   Lab " 11/10/23  0225 11/09/23  2203 11/09/23  1603 11/09/23  1123 11/09/23  0525 11/08/23  0755 11/08/23  0546 11/07/23  1025 11/07/23  0527 11/06/23  0727 11/06/23  0557   NA  --   --   --   --  139  --  138  --  139  --  140   POTASSIUM  --   --   --   --  4.1  --  4.2  --  3.9  --  4.8   CHLORIDE  --   --   --   --  99  --  97*  --  97*  --  98   TERESA  --   --   --   --  8.2*  --  8.2*  --  7.9*  --  8.1*   CO2  --   --   --   --  29  --  27  --  26  --  23   BUN  --   --   --   --  25.5*  --  55.9*  --  45.2*  --  89.6*   CR  --   --   --   --  3.75*  --  6.08*  --  4.64*  --  7.18*   GLC 96 106* 117* 129* 106*   < > 98   < > 106*   < > 98    < > = values in this interval not displayed.     CBC  Recent Labs   Lab 11/08/23  2342 11/04/23  0409   WBC  --  16.2*   HGB  --  8.8*   HCT  --  27.7*   MCV  --  90    187     Lab Results   Component Value Date    AST 34 11/10/2023    ALT 20 11/10/2023    ALKPHOS 104 11/10/2023    BILITOTAL 0.4 11/10/2023    CHARLETTE 42 10/31/2023     Lab Results   Component Value Date    INR 1.57 (H) 10/27/2023       Attestation:  I have reviewed today's vital signs, notes, medications, labs and imaging.    DO [facility]rology  Office: [phone]  Cell: [phone]

## 2023-11-11 ENCOUNTER — APPOINTMENT (OUTPATIENT)
Dept: SPEECH THERAPY | Facility: CLINIC | Age: 83
DRG: 453 | End: 2023-11-11
Attending: NEUROLOGICAL SURGERY
Payer: COMMERCIAL

## 2023-11-11 ENCOUNTER — APPOINTMENT (OUTPATIENT)
Dept: OCCUPATIONAL THERAPY | Facility: CLINIC | Age: 83
DRG: 453 | End: 2023-11-11
Attending: NEUROLOGICAL SURGERY
Payer: COMMERCIAL

## 2023-11-11 LAB
ALBUMIN SERPL BCG-MCNC: 3 G/DL (ref 3.5–5.2)
ALP SERPL-CCNC: 93 U/L (ref 35–104)
ALT SERPL W P-5'-P-CCNC: 16 U/L (ref 0–50)
ANION GAP SERPL CALCULATED.3IONS-SCNC: 10 MMOL/L (ref 7–15)
AST SERPL W P-5'-P-CCNC: 31 U/L (ref 0–45)
BILIRUB DIRECT SERPL-MCNC: <0.2 MG/DL (ref 0–0.3)
BILIRUB SERPL-MCNC: 0.4 MG/DL
BUN SERPL-MCNC: 32.5 MG/DL (ref 8–23)
CALCIUM SERPL-MCNC: 8.2 MG/DL (ref 8.8–10.2)
CHLORIDE SERPL-SCNC: 98 MMOL/L (ref 98–107)
CREAT SERPL-MCNC: 3.78 MG/DL (ref 0.51–0.95)
DEPRECATED HCO3 PLAS-SCNC: 27 MMOL/L (ref 22–29)
EGFRCR SERPLBLD CKD-EPI 2021: 11 ML/MIN/1.73M2
GLUCOSE BLDC GLUCOMTR-MCNC: 125 MG/DL (ref 70–99)
GLUCOSE BLDC GLUCOMTR-MCNC: 126 MG/DL (ref 70–99)
GLUCOSE BLDC GLUCOMTR-MCNC: 127 MG/DL (ref 70–99)
GLUCOSE BLDC GLUCOMTR-MCNC: 135 MG/DL (ref 70–99)
GLUCOSE BLDC GLUCOMTR-MCNC: 141 MG/DL (ref 70–99)
GLUCOSE BLDC GLUCOMTR-MCNC: 145 MG/DL (ref 70–99)
GLUCOSE SERPL-MCNC: 138 MG/DL (ref 70–99)
MAGNESIUM SERPL-MCNC: 1.6 MG/DL (ref 1.7–2.3)
PHOSPHATE SERPL-MCNC: 4.7 MG/DL (ref 2.5–4.5)
PLATELET # BLD AUTO: 196 10E3/UL (ref 150–450)
POTASSIUM SERPL-SCNC: 3.9 MMOL/L (ref 3.4–5.3)
PROT SERPL-MCNC: 5.9 G/DL (ref 6.4–8.3)
SODIUM SERPL-SCNC: 135 MMOL/L (ref 135–145)

## 2023-11-11 PROCEDURE — 250N000011 HC RX IP 250 OP 636: Performed by: INTERNAL MEDICINE

## 2023-11-11 PROCEDURE — 97535 SELF CARE MNGMENT TRAINING: CPT | Mod: GO | Performed by: OCCUPATIONAL THERAPIST

## 2023-11-11 PROCEDURE — B4185 PARENTERAL SOL 10 GM LIPIDS: HCPCS | Mod: JZ | Performed by: HOSPITALIST

## 2023-11-11 PROCEDURE — 250N000011 HC RX IP 250 OP 636: Mod: JZ | Performed by: NEUROLOGICAL SURGERY

## 2023-11-11 PROCEDURE — 85049 AUTOMATED PLATELET COUNT: CPT | Performed by: NEUROLOGICAL SURGERY

## 2023-11-11 PROCEDURE — 120N000001 HC R&B MED SURG/OB

## 2023-11-11 PROCEDURE — 92526 ORAL FUNCTION THERAPY: CPT | Mod: GN

## 2023-11-11 PROCEDURE — 250N000009 HC RX 250: Performed by: NEUROLOGICAL SURGERY

## 2023-11-11 PROCEDURE — 250N000011 HC RX IP 250 OP 636: Mod: JZ | Performed by: INTERNAL MEDICINE

## 2023-11-11 PROCEDURE — 80053 COMPREHEN METABOLIC PANEL: CPT | Performed by: HOSPITALIST

## 2023-11-11 PROCEDURE — C9113 INJ PANTOPRAZOLE SODIUM, VIA: HCPCS | Mod: JZ | Performed by: NEUROLOGICAL SURGERY

## 2023-11-11 PROCEDURE — 250N000009 HC RX 250: Mod: JZ | Performed by: HOSPITALIST

## 2023-11-11 PROCEDURE — 97110 THERAPEUTIC EXERCISES: CPT | Mod: GO | Performed by: OCCUPATIONAL THERAPIST

## 2023-11-11 PROCEDURE — 250N000013 HC RX MED GY IP 250 OP 250 PS 637: Performed by: HOSPITALIST

## 2023-11-11 PROCEDURE — 84100 ASSAY OF PHOSPHORUS: CPT | Performed by: HOSPITALIST

## 2023-11-11 PROCEDURE — 82248 BILIRUBIN DIRECT: CPT | Performed by: INTERNAL MEDICINE

## 2023-11-11 PROCEDURE — 83735 ASSAY OF MAGNESIUM: CPT | Performed by: HOSPITALIST

## 2023-11-11 PROCEDURE — 250N000013 HC RX MED GY IP 250 OP 250 PS 637: Performed by: INTERNAL MEDICINE

## 2023-11-11 PROCEDURE — 250N000013 HC RX MED GY IP 250 OP 250 PS 637: Performed by: NURSE PRACTITIONER

## 2023-11-11 PROCEDURE — 99207 PR CDG-CUT & PASTE-POTENTIAL IMPACT ON LEVEL: CPT | Performed by: HOSPITALIST

## 2023-11-11 PROCEDURE — 99233 SBSQ HOSP IP/OBS HIGH 50: CPT | Performed by: HOSPITALIST

## 2023-11-11 RX ORDER — MAGNESIUM OXIDE 400 MG/1
400 TABLET ORAL DAILY
Status: DISCONTINUED | OUTPATIENT
Start: 2023-11-11 | End: 2023-12-01 | Stop reason: HOSPADM

## 2023-11-11 RX ADMIN — HEPARIN SODIUM 5000 UNITS: 5000 INJECTION, SOLUTION INTRAVENOUS; SUBCUTANEOUS at 16:27

## 2023-11-11 RX ADMIN — NYSTATIN: 100000 CREAM TOPICAL at 09:23

## 2023-11-11 RX ADMIN — LEVOTHYROXINE SODIUM 100 MCG: 100 TABLET ORAL at 09:12

## 2023-11-11 RX ADMIN — MICONAZOLE NITRATE: 2 POWDER TOPICAL at 21:35

## 2023-11-11 RX ADMIN — HYDRALAZINE HYDROCHLORIDE 10 MG: 10 TABLET ORAL at 13:21

## 2023-11-11 RX ADMIN — ACETAMINOPHEN 650 MG: 325 TABLET, FILM COATED ORAL at 13:24

## 2023-11-11 RX ADMIN — ACETAMINOPHEN 650 MG: 325 TABLET, FILM COATED ORAL at 01:54

## 2023-11-11 RX ADMIN — HYDRALAZINE HYDROCHLORIDE 10 MG: 10 TABLET ORAL at 21:29

## 2023-11-11 RX ADMIN — HYDRALAZINE HYDROCHLORIDE 10 MG: 10 TABLET ORAL at 06:37

## 2023-11-11 RX ADMIN — NYSTATIN: 100000 CREAM TOPICAL at 00:35

## 2023-11-11 RX ADMIN — HEPARIN SODIUM 5000 UNITS: 5000 INJECTION, SOLUTION INTRAVENOUS; SUBCUTANEOUS at 00:34

## 2023-11-11 RX ADMIN — DILTIAZEM HYDROCHLORIDE 60 MG: 60 TABLET, FILM COATED ORAL at 13:21

## 2023-11-11 RX ADMIN — Medication 400 MG: at 16:39

## 2023-11-11 RX ADMIN — SENNOSIDES AND DOCUSATE SODIUM 1 TABLET: 8.6; 5 TABLET ORAL at 21:29

## 2023-11-11 RX ADMIN — DILTIAZEM HYDROCHLORIDE 60 MG: 60 TABLET, FILM COATED ORAL at 18:37

## 2023-11-11 RX ADMIN — DILTIAZEM HYDROCHLORIDE 60 MG: 60 TABLET, FILM COATED ORAL at 06:37

## 2023-11-11 RX ADMIN — ASCORBIC ACID, VITAMIN A PALMITATE, CHOLECALCIFEROL, THIAMINE HYDROCHLORIDE, RIBOFLAVIN-5 PHOSPHATE SODIUM, PYRIDOXINE HYDROCHLORIDE, NIACINAMIDE, DEXPANTHENOL, ALPHA-TOCOPHEROL ACETATE, VITAMIN K1, FOLIC ACID, BIOTIN, CYANOCOBALAMIN: 200; 3300; 200; 6; 3.6; 6; 40; 15; 10; 150; 600; 60; 5 INJECTION, SOLUTION INTRAVENOUS at 20:40

## 2023-11-11 RX ADMIN — LEVOFLOXACIN 500 MG: 5 INJECTION, SOLUTION INTRAVENOUS at 13:21

## 2023-11-11 RX ADMIN — OLIVE OIL AND SOYBEAN OIL 250 ML: 16; 4 INJECTION, EMULSION INTRAVENOUS at 20:32

## 2023-11-11 RX ADMIN — POLYETHYLENE GLYCOL 3350 17 G: 17 POWDER, FOR SOLUTION ORAL at 09:23

## 2023-11-11 RX ADMIN — ACETAMINOPHEN 650 MG: 325 TABLET, FILM COATED ORAL at 18:40

## 2023-11-11 RX ADMIN — Medication 5 ML: at 16:36

## 2023-11-11 RX ADMIN — SENNOSIDES AND DOCUSATE SODIUM 1 TABLET: 8.6; 5 TABLET ORAL at 09:12

## 2023-11-11 RX ADMIN — PANTOPRAZOLE SODIUM 40 MG: 40 INJECTION, POWDER, FOR SOLUTION INTRAVENOUS at 09:04

## 2023-11-11 RX ADMIN — Medication 2 SPRAY: at 13:31

## 2023-11-11 RX ADMIN — Medication 2 SPRAY: at 00:35

## 2023-11-11 RX ADMIN — HYDROXYZINE HYDROCHLORIDE 10 MG: 10 TABLET ORAL at 20:35

## 2023-11-11 RX ADMIN — TRAZODONE HYDROCHLORIDE 25 MG: 50 TABLET ORAL at 21:29

## 2023-11-11 RX ADMIN — HEPARIN SODIUM 5000 UNITS: 5000 INJECTION, SOLUTION INTRAVENOUS; SUBCUTANEOUS at 09:19

## 2023-11-11 RX ADMIN — Medication 2 SPRAY: at 09:20

## 2023-11-11 RX ADMIN — Medication 2 SPRAY: at 18:46

## 2023-11-11 ASSESSMENT — ACTIVITIES OF DAILY LIVING (ADL)
ADLS_ACUITY_SCORE: 36
ADLS_ACUITY_SCORE: 35
ADLS_ACUITY_SCORE: 36
ADLS_ACUITY_SCORE: 35
ADLS_ACUITY_SCORE: 36
ADLS_ACUITY_SCORE: 36
ADLS_ACUITY_SCORE: 35
ADLS_ACUITY_SCORE: 35

## 2023-11-11 NOTE — PROGRESS NOTES
"Neurosurgery Progress     Dx:     POD #17 s/p T11-Pelvis fusion.     Neuro stable.     TODAY'S PLAN:     -TLSO when out of bed, OK to be OFF with sitting, resting and hygiene   -OK for DVT ppx, confirmed with Dr. Waite  -OK for chest PT, confirmed with Dr. Waite  -Incentive spirometer  -Neuro check q4H  -Goal normotension  -Continue supportive and symptomatic treatment  -Pain control measures  -Routine wound care  - PT/OT     In the event that patient's symptoms worsen or change we would appreciate being contacted. We did discuss signs of a worsening problem that she should seek being evaluated.     We did review the above information with the patient whom agrees with the plan and did verbalize understanding.   ________________________________________________________________     BP (!) 154/79 (BP Location: Right arm)   Pulse 85   Temp 98.8  F (37.1  C) (Oral)   Resp 18   Ht 1.651 m (5' 5\")   Wt 100.9 kg (222 lb 7.1 oz)   LMP  (LMP Unknown)   SpO2 95%   BMI 37.02 kg/m       Patient examined in room 310. She appears comfortable and in no apparent distress. She is moving all of her extremities well.   CN II-XII intact, alert and appropriate with conversation. Follows all commands.   Bilateral upper and lower extremities with appropriate strength. Deep tendon reflexes within normal limits throughout. Sensation is also intact throughout. She does have an acceptable post-operative range of motion.   Her incision is absent for concerning edema, erythema or drainage.   Bandage is clean, dry and intact.   Calves soft and non-tender.     All pertinent labs and updated imaging reviewed in EPIC.     Cliff Rai PA-C   Neurosurgery   [phone] (P)       "

## 2023-11-11 NOTE — PROGRESS NOTES
[facility]    Hospitalist Progress Note    Interval History   No overt change    Assessment & Plan   Summary: Mya Hui is a 83 year old female with PMH Scoliosis, Lumbar radiculopathy, HTN, HLP, DM2, Hypothyroidism, CKD stage 3, Morbid obesity, GERD, Sliding hiatal hernia, Overactive bladder, OA, Chronic right shoulder pain/rotator cuff arthropathy, Migraines, Asymptomatic carotid artery stenosis who was admitted on 10/25/2023 with an elective T11 to pelvis robotic decompression and fusion.  Surgery was performed on 10/25/23 under general anesthesia.  Surgery was complicated with development of arrhythmia with significant hypotension and noted 3.5 L blood loss per Op Note and Anesthesia postprocedure evaluation note.  Patient received IV fluids, 6 units of pRBCs and 2 units of FFP intra-op.  ST changes were noted intraoperatively.  Patient remained intubated and started on Levophed and was transferred to the ICU due to hemorrhagic shock.  She required vasopressor support with Levophed until 10/27.   Patient remained intubated until the morning of 11/2 and has been on supplemental O2 per Oxymask currently at 4 L/m.   Patient received additional 2 units FFP, 10 units of cryo and 1 unit of PLT. Transferred to hospitalist on 11/3.    Post-op hemorrhagic shock, resolved  Shocked liver, improving  Type 2 MI due to hemorrhagic shock, improved  Surgery was complicated with development of arrhythmia with significant hypotension and noted 3.5 L blood loss per Op Note and Anesthesia postprocedure evaluation note.  Patient received IV fluids, 6 units of pRBCs and 2 units of FFP intra-op. Patient received additional 2 units FFP, 10 units of cryo and 1 unit of PLT while in the ICU. LFTs continue to improve 11/5-11/7.  - monitor liver function prn    Encephalopathy, multifactorial, improving  Incidental finding of left basal ganglia restricted diffusion  Patient was noted to be encephalopathic for  which Vascular Neurology was consulted.  Brain MRI was completed and noted a left basal ganglia small focus of restricted diffusion but not a cause for encephalopathy and felt it was likely multifactorial.  EEG monitor was completed and negative and subsequently stopped.  Suspect due to uremic encephalopathy and sepsis.   Encephalopathy improved 11/6  Delirium prevention:   - Reorient patient at each interaction.  - Keep room lights on and blinds open during day (8am-8pm), low lights 8pm-8am.  - Minimize interruptions of sleep at night (consolidate vitals, nursing assessments, medications).  - Avoid sedating medications as able.    - Vascular Neurology recommended repeat brain MRI in 2-3 months.      LISA/ATN requiring hemodialysis  Anion gap metabolic acidosis due to above  CKD stage 3a  Hypoalbuminemia  Anasarca  Baseline creatinine between 1.1-1.4 with GFR in the 40-50's.    Patient had been developed fluid overload and was treated with lasix drip with good output but developed anephric rising creatinine, BUN and developed worsening acidosis.  Due to development of LISA-D Nephrology was consulted and tunnel dialysis catheter placed 10/30 and has been undergoing HD Mon, Wed, Fri (last session was today, 11/3/2023).     Troponin were trended and minimally elevated.  ECHO completed and without any abnormalities (LV normal size and function with EF of 65-70%.  RV normal size, function and structure.  No valve disease).  - Nephrology consultation appreciated/continued  - Continue dialysis  - Avoid nephrotic medications as able.    - Hold PTA lisinopril 40 mg/d    Aspiration/ventilator associated PNA (Enterobacter, stenotrophomonas and E. Coli)  Acute hypoxic respiratory failure (Improving)  Leukocytosis due to above  Extubated 11/2. Patient has also developed aspiration/ventilator associated pneumonia with sputum Cx growing Enterobacter, stenotrophomonas and E. coli and was initially started on Zoysn and switched to  Meropenem on 11/1/2023.  Currently continued on Meropenem and awaiting final cultures. Procal 3. Sputum cultures growing Enterobacter, E coli, Stenotrophomonas.   O2 requirements down to 1L on 11/6  - Continue Levofloxacin, likely 14 day course ending 11/14  - Follow sputum cultures  - Duonebs scheduled  - Wean o2 as able  - RCAT consult requested  - Encourage use of IS/Flutter valve.    - Off IMC today    Dysphagia  Moderate malnutrition  Failed SLP assessment. Bedside feeding tube placement coiling due to hiatal hernia. Note improvement in dysphagia on 11/5, upgraded to pureed diet. Discussed with SLP, hold off tube feed placement while patient's dysphagia is improving, consider if improvement stalls or oral intake remains below target.  - Continue SLP assessments  - Transition meds to oral  - clear diet for now, advance as tolerates  - given inability to place feeding tube, will start peripheral nutrition via midline. Family and patient are both hopeful to avoid a a PEG tube. May end up needing TPN or PEG    Scoliosis  Lumbar radiculopathy  S/p T11 to pelvis robotic decompression and fusion  POD# 9.   - Neurosurgery is managing.   - DVT ppx  - Pain control  - Chest PT  - Neuro checks q4h   - PT/OT    HTN  - Continue Diltiazem 60 mg every 6 hours and hydralazine 10 mg every 8 hours  - Hold lisinopril    HLP: Hold PTA pravastatin 40 mg/d.      Type 2 DM, controlled  Stress induced hyperglycemia  - High dose insulin sliding scale ordered.  - Sliding scale insulin  - Hypoglycemic protocol in place.      Hypothyroidism: PTA synthroid pending feeding tube placement    GERD  Sliding hiatal hernia  - PTA PPI pending feeding tube placement    OA  Chronic right shoulder pain/rotator cuff arthropathy  Chronic Pain  - Pain/analgesic management per Neurosurgery.      Obesity   Suspected ANAYELI  Body mass index is 37.64 kg/m .  Increase in all-cause morbidity and mortality.   - Follow up with PCP regarding ongoing management.   "  - Monitor O2 saturations.    - Recommend outpatient sleep study.      Overactive bladder: Hold PTA Ditropan XL 10 mg/d.      Migraines: Pain/analgesic management per Neurosurgery.      Asymptomatic carotid artery stenosis.    NSVT  Hypomagnesemia   Plan  - monitor on telemetry with close electrolyte management  - mag oxide  - continue telemetry    Clinically Significant Risk Factors            # Hypomagnesemia: Lowest Mg = 1.6 mg/dL in last 2 days, will replace as needed   # Hypoalbuminemia: Lowest albumin = 2.5 g/dL at 11/1/2023  4:19 AM, will monitor as appropriate     # Hypertension: Noted on problem list        # Obesity: Estimated body mass index is 37.02 kg/m  as calculated from the following:    Height as of this encounter: 1.651 m (5' 5\").    Weight as of this encounter: 100.9 kg (222 lb 7.1 oz).   # Moderate Malnutrition: based on nutrition assessment      # Financial/Environmental Concerns: none          PT/OT: yes  Diet: Combination Diet Clear Liquid Diet; Liquidized/Moderately Thick (level 3) (By tsp only, 1:1 RN assist/supervision, 2-3 hard swallows per tsp, alternate textures, single ice chips, ok for 4 ounces of pudding 3x's/day, hold if respiratory status dec...  Calorie Counts  parenteral nutrition - Clinimix E  Snacks/Supplements Adult: Gelatein Plus; With Meals  parenteral nutrition - Clinimix E    DVT Prophylaxis: Heparin SQ  Spicer Catheter: Not present  Lines: PRESENT      CVC Double Lumen Right Internal jugular Non - valved (open ended);Tunneled-Site Assessment: WDL      Cardiac Monitoring: ACTIVE order. Indication: hypoxia, pneumonia  Code Status: Full Code    Disposition: Anticipated discharge pending improvement in oral intake, possibly to LTACH    Yanick Moura DO  Hospitalist Service  [facility]  Securely message with [employer] (more info)  Text page via [employer] Paging/Directory     Data reviewed today: I reviewed all new labs and imaging results over the last " 24 hours.    Physical Exam   Temp: 98.8  F (37.1  C) Temp src: Oral BP: (!) 154/79 Pulse: 85   Resp: 18 SpO2: 95 % O2 Device: None (Room air)    Vitals:    11/02/23 0525 11/03/23 0600 11/04/23 0000   Weight: 101.2 kg (223 lb 1.7 oz) 102.6 kg (226 lb 3.1 oz) 100.9 kg (222 lb 7.1 oz)     Vital Signs with Ranges  Temp:  [98.5  F (36.9  C)-98.8  F (37.1  C)] 98.8  F (37.1  C)  Pulse:  [69-85] 85  Resp:  [16-20] 18  BP: (117-154)/(63-79) 154/79  SpO2:  [95 %-97 %] 95 %  I/O last 3 completed shifts:  In: 190 [P.O.:180; I.V.:10]  Out: 900 [Other:900]  O2 requirements: no    Constitutional: Elderly female appears ill, intermittent coughing  HEENT: Eyes nonicteric, oral mucosa moist  Cardiovascular: RRR, normal S1/2, no m/r/g  Respiratory: Very coarse with some mild wheezing  Vascular: Trace BLE pitting edema  GI: Normoactive bowel sounds, appears nontender  Skin/Integumen: No rashes  Neuro/Psych: Confused and oriented to self and year, moves all extremities    Medications    dextrose      parenteral nutrition - Clinimix E      parenteral nutrition - Clinimix E 83 mL/hr at 11/10/23 2222        - MEDICATION INSTRUCTIONS for Dialysis Patients -   Does not apply See Admin Instructions    alteplase  2 mg Intracatheter Once in dialysis/CRRT    alteplase  2 mg Intracatheter Once in dialysis/CRRT    artificial saliva  2 spray Swish & Spit 4x Daily    B and C vitamin Complex with folic acid  5 mL Per Feeding Tube Daily    diltiazem  60 mg Oral or Feeding Tube Q6H NATACHA    heparin ANTICOAGULANT  5,000 Units Subcutaneous Q8H    hydrALAZINE  10 mg Oral or Feeding Tube Q8H    insulin aspart  1-6 Units Subcutaneous Q4H    levofloxacin  500 mg Intravenous Q48H    levothyroxine  100 mcg Oral or NG Tube QAM AC    lipids plant base  250 mL Intravenous Q24H    nystatin   Topical BID    pantoprazole  40 mg Per Feeding Tube QAM AC    Or    pantoprazole  40 mg Intravenous QAM AC    polyethylene glycol  17 g Oral or NG Tube Daily    senna-docusate   1 tablet Oral or NG Tube BID    sodium chloride (PF)  3 mL Intracatheter Q8H    traZODone  25 mg Oral At Bedtime       Data   Recent Labs   Lab 11/11/23  1155 11/11/23  0939 11/11/23  0938 11/10/23  1236 11/10/23  0705 11/09/23  1123 11/09/23  0525 11/09/23  0003 11/08/23  2342   PLT  --  196  --   --   --   --   --   --  181   NA  --  135  --   --  138  --  139  --   --    POTASSIUM  --  3.9  --   --  3.9  --  4.1  --   --    CHLORIDE  --  98  --   --  97*  --  99  --   --    CO2  --  27  --   --  26  --  29  --   --    BUN  --  32.5*  --   --  36.1*  --  25.5*  --   --    CR  --  3.78*  --   --  5.36*  --  3.75*  --   --    ANIONGAP  --  10  --   --  15  --  11  --   --    TERESA  --  8.2*  --   --  8.6*  --  8.2*  --   --    * 138* 125*   < > 104*   < > 106*   < >  --    ALBUMIN  --  3.0*  --   --  3.1*  3.0*  --  3.0*  --   --    PROTTOTAL  --  5.9*  --   --  6.2*  6.1*  --  6.0*  --   --    BILITOTAL  --  0.4  --   --  0.4  0.4  --  0.4  --   --    ALKPHOS  --  93  --   --  103  104  --  105*  --   --    ALT  --  16  --   --  34  20  --  22  --   --    AST  --  31  --   --  35  34  --  47*  --   --     < > = values in this interval not displayed.       Imaging:   Recent Results (from the past 24 hour(s))   US Lower Extremity Venous Duplex Right    Narrative    EXAM: US LOWER EXTREMITY VENOUS DUPLEX RIGHT  LOCATION: [facility]  DATE: 11/10/2023    INDICATION: Thigh pain, swelling, rule out DVT.  COMPARISON: None.  TECHNIQUE: Venous Duplex ultrasound of the right lower extremity with and without compression, augmentation and duplex. Color flow and spectral Doppler with waveform analysis performed.    FINDINGS: Exam includes the common femoral, femoral, popliteal, and contralateral common femoral veins as well as segmentally visualized deep calf veins and greater saphenous vein.     RIGHT: No deep vein thrombosis. No superficial thrombophlebitis. No popliteal cyst.       Impression    IMPRESSION:  1.  No deep venous thrombosis in the right lower extremity.

## 2023-11-11 NOTE — PLAN OF CARE
[id number]: Alert and oriented x3-4, neuros intact. Vital signs stable on room air. Lift assist, turn and repo as pt allows, does not like to be positioned on their side, education provided on the importance of repositioning. Tolerating clear thickened liquid diet, takes pills crushed. Lung sounds diminished. Bowel sounds active, passing flatus, small incontinent BM during shift,  no urine output, pt on HD. R knee hematoma, low back incision, CDI. Pain managed with PRN tylenol. Denies nausea. Tele SR. PIV SL, L midline with PPN infusing at 83 mL/hr, L chest tunneled HD cath CDI. Pt dangled at edge of bed during shift, refused to get up in chair.

## 2023-11-11 NOTE — PLAN OF CARE
Alert, oriented to self and time. Needs reorienting to the situation. VSS on RA. Neuros intact. RUE weakness (baseline). Tele: SR  Ax2 lift. Resists turns/repos. Difficulty getting comfortable    TPN initiated. Left midline. 83 mL/hr. Lipids 20.8 mL/hr for 12 hours. No blood draw back, switched to lab collect.  Tolerating clear moderately thick liquid diet. Calorie count. Takes pills crushed.   Dialysis MWF. No urine output. Constipated w/ fecal smears.   Wound on R inner thigh/knee. Ultrasound done during shift, shows no DVT

## 2023-11-11 NOTE — PROGRESS NOTES
CALORIE COUNT    Approximate Oral Intake for 11/10:      Calories: 180 kcal  Protein: 20g pro      Intake from TF/PN:      Clinimix E (5/4.25) @ 83 mL/hr + 250 mL 20% lipids daily   Total Parenteral Provisions: 1992 mL, 1180 kcal (17 kcal/kg), 100 g CHO (GIR = 1.03), 45 g AA (1.3 g/kg protein), and 42% kcal from fat      Estimated Needs:    Estimated Energy Needs: 1084-4011 kcals (25-30 Kcal/Kg)  Justification: maintenance  Estimated Protein Needs:  grams protein (1.2-1.8 g pro/Kg)  Justification: dialysis      Summary:    - Current diet: CLD, Moderately Thick (level 3)  - Supplements: Gelatein TID  - Patient consumed 1 Gelatein and juice yesterday  - patient consumed 10% estimated energy needs and 24% estimated protein needs w/ PO intake alone yesterday  - patient consumed 80% estimated energy needs and 80% estimated protein needs w/ PPN + PO  - Will continue to monitor calorie counts x2 more days    Alison Flores RD, LD  Clinical Dietitian - [facility]

## 2023-11-12 ENCOUNTER — APPOINTMENT (OUTPATIENT)
Dept: SPEECH THERAPY | Facility: CLINIC | Age: 83
DRG: 453 | End: 2023-11-12
Attending: NEUROLOGICAL SURGERY
Payer: COMMERCIAL

## 2023-11-12 ENCOUNTER — APPOINTMENT (OUTPATIENT)
Dept: OCCUPATIONAL THERAPY | Facility: CLINIC | Age: 83
DRG: 453 | End: 2023-11-12
Attending: NEUROLOGICAL SURGERY
Payer: COMMERCIAL

## 2023-11-12 LAB
ALBUMIN SERPL BCG-MCNC: 2.9 G/DL (ref 3.5–5.2)
ALP SERPL-CCNC: 92 U/L (ref 35–104)
ALT SERPL W P-5'-P-CCNC: 16 U/L (ref 0–50)
ANION GAP SERPL CALCULATED.3IONS-SCNC: 13 MMOL/L (ref 7–15)
AST SERPL W P-5'-P-CCNC: 24 U/L (ref 0–45)
BILIRUB DIRECT SERPL-MCNC: <0.2 MG/DL (ref 0–0.3)
BILIRUB SERPL-MCNC: 0.3 MG/DL
BUN SERPL-MCNC: 60.8 MG/DL (ref 8–23)
CALCIUM SERPL-MCNC: 8.4 MG/DL (ref 8.8–10.2)
CHLORIDE SERPL-SCNC: 93 MMOL/L (ref 98–107)
CREAT SERPL-MCNC: 4.97 MG/DL (ref 0.51–0.95)
DEPRECATED HCO3 PLAS-SCNC: 24 MMOL/L (ref 22–29)
EGFRCR SERPLBLD CKD-EPI 2021: 8 ML/MIN/1.73M2
GLUCOSE BLDC GLUCOMTR-MCNC: 101 MG/DL (ref 70–99)
GLUCOSE BLDC GLUCOMTR-MCNC: 101 MG/DL (ref 70–99)
GLUCOSE BLDC GLUCOMTR-MCNC: 109 MG/DL (ref 70–99)
GLUCOSE BLDC GLUCOMTR-MCNC: 114 MG/DL (ref 70–99)
GLUCOSE BLDC GLUCOMTR-MCNC: 114 MG/DL (ref 70–99)
GLUCOSE BLDC GLUCOMTR-MCNC: 116 MG/DL (ref 70–99)
GLUCOSE BLDC GLUCOMTR-MCNC: 121 MG/DL (ref 70–99)
GLUCOSE BLDC GLUCOMTR-MCNC: 124 MG/DL (ref 70–99)
GLUCOSE SERPL-MCNC: 105 MG/DL (ref 70–99)
MAGNESIUM SERPL-MCNC: 1.8 MG/DL (ref 1.7–2.3)
PHOSPHATE SERPL-MCNC: 6.1 MG/DL (ref 2.5–4.5)
POTASSIUM SERPL-SCNC: 4.1 MMOL/L (ref 3.4–5.3)
PROT SERPL-MCNC: 5.8 G/DL (ref 6.4–8.3)
SODIUM SERPL-SCNC: 130 MMOL/L (ref 135–145)

## 2023-11-12 PROCEDURE — B4185 PARENTERAL SOL 10 GM LIPIDS: HCPCS | Mod: JZ | Performed by: HOSPITALIST

## 2023-11-12 PROCEDURE — 83735 ASSAY OF MAGNESIUM: CPT | Performed by: HOSPITALIST

## 2023-11-12 PROCEDURE — 250N000009 HC RX 250: Performed by: NEUROLOGICAL SURGERY

## 2023-11-12 PROCEDURE — 82040 ASSAY OF SERUM ALBUMIN: CPT | Performed by: INTERNAL MEDICINE

## 2023-11-12 PROCEDURE — 99233 SBSQ HOSP IP/OBS HIGH 50: CPT | Performed by: HOSPITALIST

## 2023-11-12 PROCEDURE — 97110 THERAPEUTIC EXERCISES: CPT | Mod: GO | Performed by: OCCUPATIONAL THERAPIST

## 2023-11-12 PROCEDURE — 84100 ASSAY OF PHOSPHORUS: CPT | Performed by: HOSPITALIST

## 2023-11-12 PROCEDURE — 250N000011 HC RX IP 250 OP 636: Mod: JZ | Performed by: NEUROLOGICAL SURGERY

## 2023-11-12 PROCEDURE — 250N000013 HC RX MED GY IP 250 OP 250 PS 637: Performed by: INTERNAL MEDICINE

## 2023-11-12 PROCEDURE — C9113 INJ PANTOPRAZOLE SODIUM, VIA: HCPCS | Mod: JZ | Performed by: NEUROLOGICAL SURGERY

## 2023-11-12 PROCEDURE — 250N000013 HC RX MED GY IP 250 OP 250 PS 637: Performed by: NURSE PRACTITIONER

## 2023-11-12 PROCEDURE — 120N000001 HC R&B MED SURG/OB

## 2023-11-12 PROCEDURE — 36415 COLL VENOUS BLD VENIPUNCTURE: CPT | Performed by: HOSPITALIST

## 2023-11-12 PROCEDURE — 97535 SELF CARE MNGMENT TRAINING: CPT | Mod: GO | Performed by: OCCUPATIONAL THERAPIST

## 2023-11-12 PROCEDURE — 250N000009 HC RX 250: Mod: JZ | Performed by: HOSPITALIST

## 2023-11-12 PROCEDURE — 250N000013 HC RX MED GY IP 250 OP 250 PS 637: Performed by: HOSPITALIST

## 2023-11-12 PROCEDURE — 250N000011 HC RX IP 250 OP 636: Performed by: INTERNAL MEDICINE

## 2023-11-12 PROCEDURE — 92526 ORAL FUNCTION THERAPY: CPT | Mod: GN

## 2023-11-12 PROCEDURE — 99207 PR CDG-CUT & PASTE-POTENTIAL IMPACT ON LEVEL: CPT | Performed by: HOSPITALIST

## 2023-11-12 RX ADMIN — MICONAZOLE NITRATE: 2 POWDER TOPICAL at 09:54

## 2023-11-12 RX ADMIN — Medication 2 SPRAY: at 21:10

## 2023-11-12 RX ADMIN — Medication 2 SPRAY: at 01:03

## 2023-11-12 RX ADMIN — LEVOTHYROXINE SODIUM 100 MCG: 100 TABLET ORAL at 09:53

## 2023-11-12 RX ADMIN — DILTIAZEM HYDROCHLORIDE 60 MG: 60 TABLET, FILM COATED ORAL at 05:57

## 2023-11-12 RX ADMIN — Medication 400 MG: at 09:53

## 2023-11-12 RX ADMIN — Medication 2 SPRAY: at 18:58

## 2023-11-12 RX ADMIN — HYDRALAZINE HYDROCHLORIDE 10 MG: 10 TABLET ORAL at 05:57

## 2023-11-12 RX ADMIN — HEPARIN SODIUM 5000 UNITS: 5000 INJECTION, SOLUTION INTRAVENOUS; SUBCUTANEOUS at 01:03

## 2023-11-12 RX ADMIN — Medication 5 ML: at 16:14

## 2023-11-12 RX ADMIN — PANTOPRAZOLE SODIUM 40 MG: 40 INJECTION, POWDER, FOR SOLUTION INTRAVENOUS at 09:53

## 2023-11-12 RX ADMIN — TRAZODONE HYDROCHLORIDE 25 MG: 50 TABLET ORAL at 21:10

## 2023-11-12 RX ADMIN — DILTIAZEM HYDROCHLORIDE 60 MG: 60 TABLET, FILM COATED ORAL at 12:56

## 2023-11-12 RX ADMIN — OXYCODONE HYDROCHLORIDE 5 MG: 5 TABLET ORAL at 03:16

## 2023-11-12 RX ADMIN — Medication 2 SPRAY: at 12:56

## 2023-11-12 RX ADMIN — DILTIAZEM HYDROCHLORIDE 60 MG: 60 TABLET, FILM COATED ORAL at 01:02

## 2023-11-12 RX ADMIN — ASCORBIC ACID, VITAMIN A PALMITATE, CHOLECALCIFEROL, THIAMINE HYDROCHLORIDE, RIBOFLAVIN-5 PHOSPHATE SODIUM, PYRIDOXINE HYDROCHLORIDE, NIACINAMIDE, DEXPANTHENOL, ALPHA-TOCOPHEROL ACETATE, VITAMIN K1, FOLIC ACID, BIOTIN, CYANOCOBALAMIN: 200; 3300; 200; 6; 3.6; 6; 40; 15; 10; 150; 600; 60; 5 INJECTION, SOLUTION INTRAVENOUS at 20:55

## 2023-11-12 RX ADMIN — HYDRALAZINE HYDROCHLORIDE 10 MG: 10 TABLET ORAL at 12:56

## 2023-11-12 RX ADMIN — Medication 2 SPRAY: at 09:54

## 2023-11-12 RX ADMIN — HEPARIN SODIUM 5000 UNITS: 5000 INJECTION, SOLUTION INTRAVENOUS; SUBCUTANEOUS at 09:53

## 2023-11-12 RX ADMIN — HYDRALAZINE HYDROCHLORIDE 10 MG: 10 TABLET ORAL at 21:10

## 2023-11-12 RX ADMIN — OLIVE OIL AND SOYBEAN OIL 250 ML: 16; 4 INJECTION, EMULSION INTRAVENOUS at 20:56

## 2023-11-12 RX ADMIN — SENNOSIDES AND DOCUSATE SODIUM 1 TABLET: 8.6; 5 TABLET ORAL at 09:53

## 2023-11-12 RX ADMIN — HEPARIN SODIUM 5000 UNITS: 5000 INJECTION, SOLUTION INTRAVENOUS; SUBCUTANEOUS at 16:08

## 2023-11-12 RX ADMIN — NYSTATIN: 100000 CREAM TOPICAL at 21:10

## 2023-11-12 RX ADMIN — DILTIAZEM HYDROCHLORIDE 60 MG: 60 TABLET, FILM COATED ORAL at 21:10

## 2023-11-12 RX ADMIN — POLYETHYLENE GLYCOL 3350 17 G: 17 POWDER, FOR SOLUTION ORAL at 09:53

## 2023-11-12 RX ADMIN — ACETAMINOPHEN 650 MG: 325 TABLET, FILM COATED ORAL at 01:14

## 2023-11-12 RX ADMIN — NYSTATIN: 100000 CREAM TOPICAL at 09:55

## 2023-11-12 RX ADMIN — ACETAMINOPHEN 650 MG: 325 TABLET, FILM COATED ORAL at 14:32

## 2023-11-12 RX ADMIN — MELATONIN 5 MG TABLET 10 MG: at 01:02

## 2023-11-12 ASSESSMENT — ACTIVITIES OF DAILY LIVING (ADL)
ADLS_ACUITY_SCORE: 44
ADLS_ACUITY_SCORE: 36
ADLS_ACUITY_SCORE: 44
ADLS_ACUITY_SCORE: 36
ADLS_ACUITY_SCORE: 36
ADLS_ACUITY_SCORE: 44
ADLS_ACUITY_SCORE: 36
ADLS_ACUITY_SCORE: 48
ADLS_ACUITY_SCORE: 48
ADLS_ACUITY_SCORE: 36
ADLS_ACUITY_SCORE: 48
ADLS_ACUITY_SCORE: 36

## 2023-11-12 NOTE — PROGRESS NOTES
[facility]    Hospitalist Progress Note    Interval History   No overt change  Some anxiety last night, but back to baseline this morning  Minimal oral intake, discussed with daughter April. Strong consideration for PEG placement to ensure adequate nutrition during recovery. Although SLP has advanced diet, minimal oral intake (170 kcal yesterday)    Assessment & Plan   Summary: Mya Hui is a 83 year old female with PMH Scoliosis, Lumbar radiculopathy, HTN, HLP, DM2, Hypothyroidism, CKD stage 3, Morbid obesity, GERD, Sliding hiatal hernia, Overactive bladder, OA, Chronic right shoulder pain/rotator cuff arthropathy, Migraines, Asymptomatic carotid artery stenosis who was admitted on 10/25/2023 with an elective T11 to pelvis robotic decompression and fusion.  Surgery was performed on 10/25/23 under general anesthesia.  Surgery was complicated with development of arrhythmia with significant hypotension and noted 3.5 L blood loss per Op Note and Anesthesia postprocedure evaluation note.  Patient received IV fluids, 6 units of pRBCs and 2 units of FFP intra-op.  ST changes were noted intraoperatively.  Patient remained intubated and started on Levophed and was transferred to the ICU due to hemorrhagic shock.  She required vasopressor support with Levophed until 10/27.   Patient remained intubated until the morning of 11/2 and has been on supplemental O2 per Oxymask currently at 4 L/m.   Patient received additional 2 units FFP, 10 units of cryo and 1 unit of PLT. Transferred to hospitalist on 11/3.    Post-op hemorrhagic shock, resolved  Shocked liver, improving  Type 2 MI due to hemorrhagic shock, improved  Surgery was complicated with development of arrhythmia with significant hypotension and noted 3.5 L blood loss per Op Note and Anesthesia postprocedure evaluation note.  Patient received IV fluids, 6 units of pRBCs and 2 units of FFP intra-op. Patient received additional 2 units FFP,  10 units of cryo and 1 unit of PLT while in the ICU. LFTs continue to improve 11/5-11/7.  - monitor liver function prn    Encephalopathy, multifactorial, improving  Incidental finding of left basal ganglia restricted diffusion  Patient was noted to be encephalopathic for which Vascular Neurology was consulted.  Brain MRI was completed and noted a left basal ganglia small focus of restricted diffusion but not a cause for encephalopathy and felt it was likely multifactorial.  EEG monitor was completed and negative and subsequently stopped.  Suspect due to uremic encephalopathy and sepsis.   Encephalopathy improved 11/6  Delirium prevention:   - Reorient patient at each interaction.  - Keep room lights on and blinds open during day (8am-8pm), low lights 8pm-8am.  - Minimize interruptions of sleep at night (consolidate vitals, nursing assessments, medications).  - Avoid sedating medications as able.    - Vascular Neurology recommended repeat brain MRI in 2-3 months.      LISA/ATN requiring hemodialysis  Anion gap metabolic acidosis due to above  CKD stage 3a  Hypoalbuminemia  Anasarca  Baseline creatinine between 1.1-1.4 with GFR in the 40-50's.    Patient had been developed fluid overload and was treated with lasix drip with good output but developed anephric rising creatinine, BUN and developed worsening acidosis.  Due to development of LISA-D Nephrology was consulted and tunnel dialysis catheter placed 10/30 and has been undergoing HD Mon, Wed, Fri (last session was today, 11/3/2023).     Troponin were trended and minimally elevated.  ECHO completed and without any abnormalities (LV normal size and function with EF of 65-70%.  RV normal size, function and structure.  No valve disease).  - Nephrology consultation appreciated/continued  - Continue dialysis  - Avoid nephrotic medications as able.    - Hold PTA lisinopril 40 mg/d    Aspiration/ventilator associated PNA (Enterobacter, stenotrophomonas and E. Coli)  Acute  hypoxic respiratory failure (Improving)  Leukocytosis due to above  Extubated 11/2. Patient has also developed aspiration/ventilator associated pneumonia with sputum Cx growing Enterobacter, stenotrophomonas and E. coli and was initially started on Zoysn and switched to Meropenem on 11/1/2023.  Currently continued on Meropenem and awaiting final cultures. Procal 3. Sputum cultures growing Enterobacter, E coli, Stenotrophomonas.   O2 requirements down to 1L on 11/6  - Continue Levofloxacin, likely 14 day course ending 11/14  - Follow sputum cultures  - Christiane scheduled  - Wean o2 as able  - RCAT consult requested  - Encourage use of IS/Flutter valve.    - Off IMC today    Dysphagia  Moderate malnutrition  Failed SLP assessment. Bedside feeding tube placement coiling due to hiatal hernia. Note improvement in dysphagia on 11/5, upgraded to pureed diet. Discussed with SLP, hold off tube feed placement while patient's dysphagia is improving, consider if improvement stalls or oral intake remains below target.  - Continue SLP assessments  - Transition meds to oral  - pureed diet with moderately thick liquids  - given inability to place feeding tube, will start peripheral nutrition via midline. Rediscussed PEG tube on 11/12 and more agreeable for it given minimal oral intake. May need rediscussions    Scoliosis  Lumbar radiculopathy  S/p T11 to pelvis robotic decompression and fusion    - Neurosurgery is managing.   - DVT ppx  - Pain control  - Chest PT  - Neuro checks q4h   - PT/OT    HTN  - Continue Diltiazem 60 mg every 6 hours and hydralazine 10 mg every 8 hours  - Hold lisinopril    HLP: Hold PTA pravastatin 40 mg/d.      Type 2 DM, controlled  Stress induced hyperglycemia  - High dose insulin sliding scale ordered.  - Sliding scale insulin  - Hypoglycemic protocol in place.      Hypothyroidism: PTA synthroid pending feeding tube placement    GERD  Sliding hiatal hernia  - PTA PPI    OA  Chronic right shoulder  "pain/rotator cuff arthropathy  Chronic Pain  - Pain/analgesic management per Neurosurgery.      Obesity   Suspected ANAYELI  Body mass index is 37.64 kg/m .  Increase in all-cause morbidity and mortality.   - Follow up with PCP regarding ongoing management.    - Monitor O2 saturations.    - Recommend outpatient sleep study.      Overactive bladder: Hold PTA Ditropan XL 10 mg/d.      Migraines: Pain/analgesic management per Neurosurgery.      Asymptomatic carotid artery stenosis.    NSVT  Hypomagnesemia   Plan  - monitor on telemetry with close electrolyte management  - mag oxide  - continue telemetry    Clinically Significant Risk Factors            # Hypomagnesemia: Lowest Mg = 1.6 mg/dL in last 2 days, will replace as needed   # Hypoalbuminemia: Lowest albumin = 2.5 g/dL at 11/1/2023  4:19 AM, will monitor as appropriate     # Hypertension: Noted on problem list        # Obesity: Estimated body mass index is 36.47 kg/m  as calculated from the following:    Height as of this encounter: 1.651 m (5' 5\").    Weight as of this encounter: 99.4 kg (219 lb 2.2 oz).   # Moderate Malnutrition: based on nutrition assessment      # Financial/Environmental Concerns: none          PT/OT: yes  Diet: Calorie Counts  Snacks/Supplements Adult: Gelatein Plus; With Meals  parenteral nutrition - Clinimix E  Combination Diet Pureed Diet (level 4); Liquidized/Moderately Thick (level 3) (By tsp only, 1:1 RN assist/supervision, 2-3 hard swallows per tsp, alternate textures, hold if respiratory status declines/increased congested coughing, upright 30 min ...  parenteral nutrition - Clinimix E    DVT Prophylaxis: Heparin SQ  Spicer Catheter: Not present  Lines: PRESENT      CVC Double Lumen Right Internal jugular Non - valved (open ended);Tunneled-Site Assessment: WDL      Cardiac Monitoring: None  Code Status: Full Code    Disposition: Anticipated discharge pending improvement in oral intake, possibly to LTACH    Yanick Moura, " DO  Hospitalist Service  [facility]  Securely message with [employer] (more info)  Text page via [employer]     Data reviewed today: I reviewed all new labs and imaging results over the last 24 hours.    Physical Exam   Temp: 98.5  F (36.9  C) Temp src: Oral BP: 136/73 Pulse: 76   Resp: 18 SpO2: 97 % O2 Device: None (Room air)    Vitals:    11/03/23 0600 11/04/23 0000 11/12/23 0501   Weight: 102.6 kg (226 lb 3.1 oz) 100.9 kg (222 lb 7.1 oz) 99.4 kg (219 lb 2.2 oz)     Vital Signs with Ranges  Temp:  [98.3  F (36.8  C)-99.1  F (37.3  C)] 98.5  F (36.9  C)  Pulse:  [71-79] 76  Resp:  [13-20] 18  BP: (128-150)/(69-78) 136/73  SpO2:  [95 %-99 %] 97 %  I/O last 3 completed shifts:  In: 520 [P.O.:520]  Out: 200 [Urine:200]  O2 requirements: no    Constitutional: Elderly female appears ill, intermittent coughing  HEENT: Eyes nonicteric, oral mucosa moist  Cardiovascular: RRR, normal S1/2, no m/r/g  Respiratory: Very coarse with some mild wheezing  Vascular: Trace BLE pitting edema  GI: Normoactive bowel sounds, appears nontender  Skin/Integumen: No rashes  Neuro/Psych: Confused and oriented to self and year, moves all extremities    Medications    dextrose      parenteral nutrition - Clinimix E      [Held by provider] parenteral nutrition - Clinimix E Stopped (11/12/23 0430)        - MEDICATION INSTRUCTIONS for Dialysis Patients -   Does not apply See Admin Instructions    alteplase  2 mg Intracatheter Once in dialysis/CRRT    alteplase  2 mg Intracatheter Once in dialysis/CRRT    artificial saliva  2 spray Swish & Spit 4x Daily    B and C vitamin Complex with folic acid  5 mL Per Feeding Tube Daily    diltiazem  60 mg Oral or Feeding Tube Q6H NATACHA    heparin ANTICOAGULANT  5,000 Units Subcutaneous Q8H    hydrALAZINE  10 mg Oral or Feeding Tube Q8H    insulin aspart  1-6 Units Subcutaneous Q4H    levofloxacin  500 mg Intravenous Q48H    levothyroxine  100 mcg Oral or NG Tube QAM AC     lipids plant base  250 mL Intravenous Q24H    magnesium oxide  400 mg Oral Daily    nystatin   Topical BID    pantoprazole  40 mg Per Feeding Tube QAM AC    Or    pantoprazole  40 mg Intravenous QAM AC    polyethylene glycol  17 g Oral or NG Tube Daily    senna-docusate  1 tablet Oral or NG Tube BID    sodium chloride (PF)  3 mL Intracatheter Q8H    traZODone  25 mg Oral At Bedtime       Data   Recent Labs   Lab 11/12/23  1149 11/12/23  0825 11/12/23  0718 11/11/23  1155 11/11/23  0939 11/10/23  1236 11/10/23  0705 11/09/23  0003 11/08/23  2342   PLT  --   --   --   --  196  --   --   --  181   NA  --   --  130*  --  135  --  138   < >  --    POTASSIUM  --   --  4.1  --  3.9  --  3.9   < >  --    CHLORIDE  --   --  93*  --  98  --  97*   < >  --    CO2  --   --  24  --  27  --  26   < >  --    BUN  --   --  60.8*  --  32.5*  --  36.1*   < >  --    CR  --   --  4.97*  --  3.78*  --  5.36*   < >  --    ANIONGAP  --   --  13  --  10  --  15   < >  --    TERESA  --   --  8.4*  --  8.2*  --  8.6*   < >  --    * 101* 105*   < > 138*   < > 104*   < >  --    ALBUMIN  --   --  2.9*  --  3.0*  --  3.1*  3.0*   < >  --    PROTTOTAL  --   --  5.8*  --  5.9*  --  6.2*  6.1*   < >  --    BILITOTAL  --   --  0.3  --  0.4  --  0.4  0.4   < >  --    ALKPHOS  --   --  92  --  93  --  103  104   < >  --    ALT  --   --  16  --  16  --  34  20   < >  --    AST  --   --  24  --  31  --  35  34   < >  --     < > = values in this interval not displayed.       Imaging:   No results found for this or any previous visit (from the past 24 hour(s)).

## 2023-11-12 NOTE — PLAN OF CARE
[id number]: Alert and oriented x3-4, neuros intact. VSS on room air. Lift assist, turn and repo as pt allows, does not like to be positioned on their side. Repositioned on L side which triggered significant anxiety, which was managed with aromatherapy, cold washcloth, and PRN meds. Education provided on the importance of repositioning. Diet advanced by speech to puree but was unable to order food before kitchen closed. Lung sounds diminished. Bowel sounds active, passing flatus, small incontinent BM during shift. No urine output, pt on HD. R knee hematoma w/ blackened scabbed area in middle, low back incision well approximated, CDI. Pain managed with PRN tylenol. Denies nausea. Tele SR. R PIV SL, L midline with PPN infusing at 83 mL/hr and lipids at 20.8 mL/hr , R chest tunneled HD cath CDI.            Goal Outcome Evaluation:      Plan of Care Reviewed With: patient, child    Overall Patient Progress: no change    Outcome Evaluation: Pt continues to have little appetite.

## 2023-11-12 NOTE — PROGRESS NOTES
"Neurosurgery Progress     Dx:     POD #18 s/p T11-P fusion, hemorrhagic shock.     Neuro stable.     TODAY'S PLAN:     We have no change in our plan.     -TLSO when out of bed, OK to be OFF with sitting, resting and hygiene   -OK for DVT ppx, confirmed with Dr. Waite  -OK for chest PT, confirmed with Dr. Waite  -Incentive spirometer  -Neuro check q4H  -Goal normotension  -Continue supportive and symptomatic treatment  -Pain control measures  -Routine wound care  - PT/OT      In the event that patient's symptoms worsen or change we would appreciate being contacted. We did discuss signs of a worsening problem that she should seek being evaluated.     We did review the above information with the patient whom agrees with the plan and did verbalize understanding.   ________________________________________________________________     /73 (BP Location: Right arm, Cuff Size: Adult Regular)   Pulse 76   Temp 98.5  F (36.9  C) (Oral)   Resp 18   Ht 1.651 m (5' 5\")   Wt 99.4 kg (219 lb 2.2 oz)   LMP  (LMP Unknown)   SpO2 97%   BMI 36.47 kg/m       Pt in bed. Appears comfortable and in no apparent distress, moving all extremities.   CN II-XII intact, alert and mostly appropriate with conversation and following commands. Confused.   Bilateral upper and lower extremities with appropriate strength. Sensation intact throughout. Acceptable ROM.   Calves soft and non-tender bilaterally.     All pertinent labs and updated imaging reviewed in EPIC.     Cliff Rai PA-C   Neurosurgery   [phone] (P)     "

## 2023-11-12 NOTE — PHARMACY-PHARMACOTHERAPY NOTE
PPN formula evaluated based on today s labs results.    The following changes have been made:  Protein: No change.  Dextrose: No change.   Sodium:  No change .  Potassium: No change .  Calcium:  No change .  Magnesium: No change .  Phosphorus: No change .  Chloride:Acetate ratio: No change  Insulin:No change.  Trace elements:No change.  Multivitamins No change .    Patient gets hemodialysis so there is concern for fluid overloading. Pt was on PPN at 83 ml/hr which has been decreased to 40 ml/hr to avoid overloading however not providing much nutrition. Best option for feeding would be TPN which can be concentrated and provide nutrition but would mean placing a central line. Will reassess with MD on Monday what the plan will be moving forward.  Pharmacy will continue to follow and adjust as appropriate.    Graham Johns RPH

## 2023-11-12 NOTE — PROGRESS NOTES
HD-dependent LISA.  Pt is due to run tmrw. She had hypotension at the last run so will shoot for a max 1L UF.  Still not urinating properly.  Orders placed.

## 2023-11-12 NOTE — PROGRESS NOTES
CALORIE COUNT      Approximate Oral Intake for: 11/11  Calories: 135  Protein: 10 grams      Number of Meals Recorded: 0 (just juices)        Number of Snacks Recorded: 1 supplement      PPN providing:     Clinimix E (5/4.25) @ 83 mL/hr + 250 mL 20% lipids daily   Total Parenteral Provisions: 1992 mL, 1180 kcal (17 kcal/kg), 100 g CHO (GIR = 1.03), 85 g AA (1.3 g/kg protein), and 42% kcal from fat      Dosing wt: 67.9 kg    Estimated Needs:    Calories: 5546-7809 (25-30 kcal/kg)  Protein:  grams (1.2-1.8 g/kg)      Summary:   Diet: Pureed Diet (level 4); Liquidized/Moderately Thick (level 3)  Snacks/Supplements Adult: Gelatein Plus; With Meals    PO intake yesterday = 8% of min energy needs and 12% of min protein needs  Per chart review, PPN held yesterday d/t concern for fluid overload    Recommendations:   If unable to re-start PPN d/t fluid-status and unable to place FT- recommend consideration of TPN as it can be concentrated (if w/in GOC)  Ordered Magic Cup w/ breakfast  Ordered Nepro w/ lunch   Continue w/ Gel+ w/ dinnner    3-day summary to follow    Nora Ferris RD, LD

## 2023-11-12 NOTE — CARE PLAN
[id number]  Orientations: A&Ox3-4, neuros intact  Vitals/Pain: VSS on RA (SPB in 140-150s) pain relived w/ PRN oxy  Tele: NSR  Lines/Drains: L midline SL, R chest tunneled cath for HD CDI, R  PIV SL,   Skin/Wounds: R knee hematoma w/ blackened scabbed area in middle, low back incision well approximated, CDI, L forearm skin tear w/foam dressing CDI  GI/: Minimal UOP via external cath. No BM on shift. Purree and thickened liquid diet  Labs: Abnormal/Trends, Electrolyte Replacement- labs pending, BG (124,101)  Ambulation/Assist: turn and repo q2h  Sleep Quality: fair, PRN melatonin given  Plan: PPN and Lipids held per providers orders @ 0500 d/t possible fluid overload. Plan for HD Monday.

## 2023-11-12 NOTE — PROGRESS NOTES
Patient is A/O x 3 with forgetfulness, vss, a-febrile, c/o back pain, s/p spinal elective surgery T11-S1 on 10/25, tylenol and oxycodone helping, patient gets hemodialysis M-W-F, will be dialyzed tomorrow, on pureed diet with thick liquids, poor appetite, patient on TPN and Lipids, is on hold since 5 am this morning due to fluid overload, will be restarted this evening, up to the chair with A2 and lift, patient sat in chair for only 30 minutes this afternoon and was back to bed, turn and reposition Q 2 hrs.

## 2023-11-13 ENCOUNTER — APPOINTMENT (OUTPATIENT)
Dept: SPEECH THERAPY | Facility: CLINIC | Age: 83
DRG: 453 | End: 2023-11-13
Attending: NEUROLOGICAL SURGERY
Payer: COMMERCIAL

## 2023-11-13 ENCOUNTER — APPOINTMENT (OUTPATIENT)
Dept: PHYSICAL THERAPY | Facility: CLINIC | Age: 83
DRG: 453 | End: 2023-11-13
Attending: NEUROLOGICAL SURGERY
Payer: COMMERCIAL

## 2023-11-13 LAB
ALBUMIN SERPL BCG-MCNC: 2.7 G/DL (ref 3.5–5.2)
ALP SERPL-CCNC: 97 U/L (ref 35–104)
ALT SERPL W P-5'-P-CCNC: 15 U/L (ref 0–50)
ANION GAP SERPL CALCULATED.3IONS-SCNC: 12 MMOL/L (ref 7–15)
AST SERPL W P-5'-P-CCNC: 27 U/L (ref 0–45)
BILIRUB DIRECT SERPL-MCNC: <0.2 MG/DL (ref 0–0.3)
BILIRUB SERPL-MCNC: 0.3 MG/DL
BUN SERPL-MCNC: 70.8 MG/DL (ref 8–23)
CALCIUM SERPL-MCNC: 8.4 MG/DL (ref 8.8–10.2)
CHLORIDE SERPL-SCNC: 94 MMOL/L (ref 98–107)
CREAT SERPL-MCNC: 5.94 MG/DL (ref 0.51–0.95)
DEPRECATED HCO3 PLAS-SCNC: 24 MMOL/L (ref 22–29)
EGFRCR SERPLBLD CKD-EPI 2021: 7 ML/MIN/1.73M2
ERYTHROCYTE [DISTWIDTH] IN BLOOD BY AUTOMATED COUNT: 16.4 % (ref 10–15)
GLUCOSE BLDC GLUCOMTR-MCNC: 106 MG/DL (ref 70–99)
GLUCOSE BLDC GLUCOMTR-MCNC: 110 MG/DL (ref 70–99)
GLUCOSE BLDC GLUCOMTR-MCNC: 111 MG/DL (ref 70–99)
GLUCOSE BLDC GLUCOMTR-MCNC: 114 MG/DL (ref 70–99)
GLUCOSE BLDC GLUCOMTR-MCNC: 144 MG/DL (ref 70–99)
GLUCOSE BLDC GLUCOMTR-MCNC: 93 MG/DL (ref 70–99)
GLUCOSE SERPL-MCNC: 110 MG/DL (ref 70–99)
HCT VFR BLD AUTO: 25.6 % (ref 35–47)
HGB BLD-MCNC: 8.2 G/DL (ref 11.7–15.7)
MAGNESIUM SERPL-MCNC: 1.9 MG/DL (ref 1.7–2.3)
MCH RBC QN AUTO: 28.8 PG (ref 26.5–33)
MCHC RBC AUTO-ENTMCNC: 32 G/DL (ref 31.5–36.5)
MCV RBC AUTO: 90 FL (ref 78–100)
PHOSPHATE SERPL-MCNC: 6.8 MG/DL (ref 2.5–4.5)
PLATELET # BLD AUTO: 210 10E3/UL (ref 150–450)
POTASSIUM SERPL-SCNC: 4.4 MMOL/L (ref 3.4–5.3)
PROT SERPL-MCNC: 5.6 G/DL (ref 6.4–8.3)
RBC # BLD AUTO: 2.85 10E6/UL (ref 3.8–5.2)
SODIUM SERPL-SCNC: 130 MMOL/L (ref 135–145)
TRIGL SERPL-MCNC: 227 MG/DL
WBC # BLD AUTO: 8.6 10E3/UL (ref 4–11)

## 2023-11-13 PROCEDURE — 250N000013 HC RX MED GY IP 250 OP 250 PS 637: Performed by: INTERNAL MEDICINE

## 2023-11-13 PROCEDURE — 36415 COLL VENOUS BLD VENIPUNCTURE: CPT | Performed by: INTERNAL MEDICINE

## 2023-11-13 PROCEDURE — 250N000011 HC RX IP 250 OP 636: Mod: JZ | Performed by: NEUROLOGICAL SURGERY

## 2023-11-13 PROCEDURE — 85027 COMPLETE CBC AUTOMATED: CPT | Performed by: INTERNAL MEDICINE

## 2023-11-13 PROCEDURE — 120N000001 HC R&B MED SURG/OB

## 2023-11-13 PROCEDURE — 250N000011 HC RX IP 250 OP 636: Performed by: INTERNAL MEDICINE

## 2023-11-13 PROCEDURE — 92526 ORAL FUNCTION THERAPY: CPT | Mod: GN | Performed by: SPEECH-LANGUAGE PATHOLOGIST

## 2023-11-13 PROCEDURE — 36415 COLL VENOUS BLD VENIPUNCTURE: CPT | Performed by: HOSPITALIST

## 2023-11-13 PROCEDURE — 90935 HEMODIALYSIS ONE EVALUATION: CPT | Performed by: INTERNAL MEDICINE

## 2023-11-13 PROCEDURE — 258N000003 HC RX IP 258 OP 636: Performed by: INTERNAL MEDICINE

## 2023-11-13 PROCEDURE — 83735 ASSAY OF MAGNESIUM: CPT | Performed by: HOSPITALIST

## 2023-11-13 PROCEDURE — 97110 THERAPEUTIC EXERCISES: CPT | Mod: GP

## 2023-11-13 PROCEDURE — 250N000011 HC RX IP 250 OP 636: Mod: JZ | Performed by: INTERNAL MEDICINE

## 2023-11-13 PROCEDURE — 99233 SBSQ HOSP IP/OBS HIGH 50: CPT | Performed by: STUDENT IN AN ORGANIZED HEALTH CARE EDUCATION/TRAINING PROGRAM

## 2023-11-13 PROCEDURE — 250N000013 HC RX MED GY IP 250 OP 250 PS 637: Performed by: HOSPITALIST

## 2023-11-13 PROCEDURE — 99207 PR CDG-CUT & PASTE-POTENTIAL IMPACT ON LEVEL: CPT | Performed by: STUDENT IN AN ORGANIZED HEALTH CARE EDUCATION/TRAINING PROGRAM

## 2023-11-13 PROCEDURE — 84100 ASSAY OF PHOSPHORUS: CPT | Performed by: HOSPITALIST

## 2023-11-13 PROCEDURE — B4185 PARENTERAL SOL 10 GM LIPIDS: HCPCS | Mod: JZ | Performed by: HOSPITALIST

## 2023-11-13 PROCEDURE — 250N000009 HC RX 250: Performed by: INTERNAL MEDICINE

## 2023-11-13 PROCEDURE — 250N000009 HC RX 250: Performed by: NEUROLOGICAL SURGERY

## 2023-11-13 PROCEDURE — 82248 BILIRUBIN DIRECT: CPT | Performed by: INTERNAL MEDICINE

## 2023-11-13 PROCEDURE — C9113 INJ PANTOPRAZOLE SODIUM, VIA: HCPCS | Mod: JZ | Performed by: NEUROLOGICAL SURGERY

## 2023-11-13 PROCEDURE — 80069 RENAL FUNCTION PANEL: CPT | Performed by: HOSPITALIST

## 2023-11-13 PROCEDURE — 250N000009 HC RX 250: Mod: JZ | Performed by: HOSPITALIST

## 2023-11-13 PROCEDURE — 84478 ASSAY OF TRIGLYCERIDES: CPT | Performed by: HOSPITALIST

## 2023-11-13 PROCEDURE — 90935 HEMODIALYSIS ONE EVALUATION: CPT

## 2023-11-13 PROCEDURE — 250N000013 HC RX MED GY IP 250 OP 250 PS 637: Performed by: NURSE PRACTITIONER

## 2023-11-13 PROCEDURE — 90937 HEMODIALYSIS REPEATED EVAL: CPT

## 2023-11-13 RX ORDER — ALBUMIN (HUMAN) 12.5 G/50ML
50 SOLUTION INTRAVENOUS
Status: DISCONTINUED | OUTPATIENT
Start: 2023-11-13 | End: 2023-11-13

## 2023-11-13 RX ORDER — DILTIAZEM HYDROCHLORIDE 180 MG/1
180 CAPSULE, COATED, EXTENDED RELEASE ORAL DAILY
Status: DISCONTINUED | OUTPATIENT
Start: 2023-11-13 | End: 2023-12-01 | Stop reason: HOSPADM

## 2023-11-13 RX ORDER — DILTIAZEM HCL 60 MG
180 TABLET ORAL DAILY
Status: DISCONTINUED | OUTPATIENT
Start: 2023-11-14 | End: 2023-11-13

## 2023-11-13 RX ADMIN — LEVOFLOXACIN 500 MG: 5 INJECTION, SOLUTION INTRAVENOUS at 15:09

## 2023-11-13 RX ADMIN — HYDRALAZINE HYDROCHLORIDE 10 MG: 10 TABLET ORAL at 15:15

## 2023-11-13 RX ADMIN — HYDRALAZINE HYDROCHLORIDE 10 MG: 10 TABLET ORAL at 21:16

## 2023-11-13 RX ADMIN — IPRATROPIUM BROMIDE AND ALBUTEROL SULFATE 3 ML: .5; 3 SOLUTION RESPIRATORY (INHALATION) at 00:26

## 2023-11-13 RX ADMIN — Medication 2 SPRAY: at 21:32

## 2023-11-13 RX ADMIN — ACETAMINOPHEN 650 MG: 325 TABLET, FILM COATED ORAL at 17:06

## 2023-11-13 RX ADMIN — OLIVE OIL AND SOYBEAN OIL 250 ML: 16; 4 INJECTION, EMULSION INTRAVENOUS at 21:16

## 2023-11-13 RX ADMIN — HYDRALAZINE HYDROCHLORIDE 10 MG: 10 TABLET ORAL at 05:56

## 2023-11-13 RX ADMIN — SODIUM CHLORIDE 300 ML: 9 INJECTION, SOLUTION INTRAVENOUS at 12:03

## 2023-11-13 RX ADMIN — Medication 400 MG: at 08:19

## 2023-11-13 RX ADMIN — SODIUM CHLORIDE 250 ML: 9 INJECTION, SOLUTION INTRAVENOUS at 12:02

## 2023-11-13 RX ADMIN — TRAZODONE HYDROCHLORIDE 25 MG: 50 TABLET ORAL at 21:16

## 2023-11-13 RX ADMIN — ASCORBIC ACID, VITAMIN A PALMITATE, CHOLECALCIFEROL, THIAMINE HYDROCHLORIDE, RIBOFLAVIN-5 PHOSPHATE SODIUM, PYRIDOXINE HYDROCHLORIDE, NIACINAMIDE, DEXPANTHENOL, ALPHA-TOCOPHEROL ACETATE, VITAMIN K1, FOLIC ACID, BIOTIN, CYANOCOBALAMIN: 200; 3300; 200; 6; 3.6; 6; 40; 15; 10; 150; 600; 60; 5 INJECTION, SOLUTION INTRAVENOUS at 21:16

## 2023-11-13 RX ADMIN — DILTIAZEM HYDROCHLORIDE 60 MG: 60 TABLET, FILM COATED ORAL at 05:56

## 2023-11-13 RX ADMIN — HEPARIN SODIUM 5000 UNITS: 5000 INJECTION, SOLUTION INTRAVENOUS; SUBCUTANEOUS at 17:04

## 2023-11-13 RX ADMIN — ACETAMINOPHEN 650 MG: 325 TABLET, FILM COATED ORAL at 09:27

## 2023-11-13 RX ADMIN — PANTOPRAZOLE SODIUM 40 MG: 40 INJECTION, POWDER, FOR SOLUTION INTRAVENOUS at 08:19

## 2023-11-13 RX ADMIN — LEVOTHYROXINE SODIUM 100 MCG: 100 TABLET ORAL at 08:19

## 2023-11-13 RX ADMIN — HEPARIN SODIUM 5000 UNITS: 5000 INJECTION, SOLUTION INTRAVENOUS; SUBCUTANEOUS at 00:26

## 2023-11-13 RX ADMIN — Medication 2 SPRAY: at 15:09

## 2023-11-13 RX ADMIN — Medication 5 ML: at 17:04

## 2023-11-13 RX ADMIN — NYSTATIN: 100000 CREAM TOPICAL at 21:16

## 2023-11-13 ASSESSMENT — ACTIVITIES OF DAILY LIVING (ADL)
ADLS_ACUITY_SCORE: 48
ADLS_ACUITY_SCORE: 44
ADLS_ACUITY_SCORE: 48
ADLS_ACUITY_SCORE: 44
ADLS_ACUITY_SCORE: 48
ADLS_ACUITY_SCORE: 48

## 2023-11-13 NOTE — PROGRESS NOTES
[facility]    Hospitalist Progress Note    Interval History   Patient without many complaints today however unsure she has insight into her medical stay. Oriented but cannot really explain her medical problems. Updated nursing on plan.    Assessment & Plan   Summary: Mya Hui is a 83 year old female with PMH Scoliosis, Lumbar radiculopathy, HTN, HLP, DM2, Hypothyroidism, CKD stage 3, Morbid obesity, GERD, Sliding hiatal hernia, Overactive bladder, OA, Chronic right shoulder pain/rotator cuff arthropathy, Migraines, Asymptomatic carotid artery stenosis who was admitted on 10/25/2023 with an elective T11 to pelvis robotic decompression and fusion.  Surgery was performed on 10/25/23 under general anesthesia.  Surgery was complicated with development of arrhythmia with significant hypotension and noted 3.5 L blood loss per Op Note and Anesthesia postprocedure evaluation note.  Patient received IV fluids, 6 units of pRBCs and 2 units of FFP intra-op.  ST changes were noted intraoperatively.  Patient remained intubated and started on Levophed and was transferred to the ICU due to hemorrhagic shock.  She required vasopressor support with Levophed until 10/27.   Patient remained intubated until the morning of 11/2 and has been on supplemental O2 per Oxymask currently at 4 L/m.   Patient received additional 2 units FFP, 10 units of cryo and 1 unit of PLT. Transferred to hospitalist on 11/3.    Post-op hemorrhagic shock, resolved  Shocked liver-resolved  Type 2 MI due to hemorrhagic shock, improved  Surgery was complicated with development of arrhythmia with significant hypotension and noted 3.5 L blood loss per Op Note and Anesthesia postprocedure evaluation note.  Patient received IV fluids, 6 units of pRBCs and 2 units of FFP intra-op. Patient received additional 2 units FFP, 10 units of cryo and 1 unit of PLT while in the ICU. LFTs continue to improve 11/5-11/7.    Encephalopathy,  multifactorial, improving  Incidental finding of left basal ganglia restricted diffusion  Patient was noted to be encephalopathic for which Vascular Neurology was consulted.  Brain MRI was completed and noted a left basal ganglia small focus of restricted diffusion but not a cause for encephalopathy and felt it was likely multifactorial.  EEG monitor was completed and negative and subsequently stopped.  Suspect due to uremic encephalopathy and sepsis.   Encephalopathy improved 11/6  Delirium prevention:   - Reorient patient at each interaction.  - Keep room lights on and blinds open during day (8am-8pm), low lights 8pm-8am.  - Minimize interruptions of sleep at night (consolidate vitals, nursing assessments, medications).  - Avoid sedating medications as able.    - Vascular Neurology recommended repeat brain MRI in 2-3 months.      LISA/ATN requiring hemodialysis  Anion gap metabolic acidosis due to above  CKD stage 3a  Hypoalbuminemia  Anasarca  Baseline creatinine between 1.1-1.4 with GFR in the 40-50's.    Patient had been developed fluid overload and was treated with lasix drip with good output but developed anephric rising creatinine, BUN and developed worsening acidosis.  Due to development of LISA-D Nephrology was consulted and tunnel dialysis catheter placed 10/30 and has been undergoing HD Mon, Wed, Fri (last session was today, 11/3/2023).     Troponin were trended and minimally elevated.  ECHO completed and without any abnormalities (LV normal size and function with EF of 65-70%.  RV normal size, function and structure.  No valve disease).  - Continue dialysis, now on MWF schedule. Will need outpatient dialysis as we await recovery of renal function    Aspiration/ventilator associated PNA (Enterobacter, stenotrophomonas and E. Coli)  Acute hypoxic respiratory failure (Improving)  Leukocytosis due to above  Extubated 11/2. Patient has also developed aspiration/ventilator associated pneumonia with sputum Cx  growing Enterobacter, stenotrophomonas and E. coli and was initially started on Zoysn and switched to Meropenem on 11/1/2023.  Currently continued on Meropenem and awaiting final cultures. Procal 3. Sputum cultures growing Enterobacter, E coli, Stenotrophomonas.   O2 requirements down to 1L on 11/6  - Continue Levofloxacin, likely 14 day course ending 11/14  - Follow sputum cultures  - Duonebs scheduled  - Wean o2 as able  - RCAT consult requested  - Encourage use of IS/Flutter valve.      Dysphagia  Moderate malnutrition  Failed SLP assessment. Bedside feeding tube placement coiling due to hiatal hernia. Note improvement in dysphagia on 11/5, upgraded to pureed diet. Discussed with SLP, hold off tube feed placement while patient's dysphagia is improving, consider if improvement stalls or oral intake remains below target.    - Continue SLP assessments.  - Transition meds to oral  - pureed diet with moderately thick liquids with TPN    Scoliosis  Lumbar radiculopathy  S/p T11 to pelvis robotic decompression and fusion  - Neurosurgery is managing.   - DVT ppx  - Pain control  - Chest PT  - Neuro checks q4h   - PT/OT    HTN  - transition to long acting dilt today. Continue hydralazine  - holding PTA lisinopril for ATN    HLP: Hold PTA pravastatin 40 mg/d.      Type 2 DM, controlled  Stress induced hyperglycemia  - High dose insulin sliding scale ordered.  - Sliding scale insulin  - Hypoglycemic protocol in place.      Hypothyroidism: PTA synthroid pending feeding tube placement    GERD  Sliding hiatal hernia  - PTA PPI    OA  Chronic right shoulder pain/rotator cuff arthropathy  Chronic Pain  - Pain/analgesic management per Neurosurgery.      Obesity   Suspected ANAYELI  Body mass index is 37.64 kg/m .  Increase in all-cause morbidity and mortality.   - Follow up with PCP regarding ongoing management.    - Monitor O2 saturations.    - Recommend outpatient sleep study.      Overactive bladder: Hold PTA Ditropan XL 10 mg/d.   "    Migraines: Pain/analgesic management per Neurosurgery.      Asymptomatic carotid artery stenosis.    NSVT  Hypomagnesemia   Plan  - monitor on telemetry with close electrolyte management  - mag oxide  - continue telemetry    Clinically Significant Risk Factors              # Hypoalbuminemia: Lowest albumin = 2.5 g/dL at 11/1/2023  4:19 AM, will monitor as appropriate     # Hypertension: Noted on problem list        # Obesity: Estimated body mass index is 37.02 kg/m  as calculated from the following:    Height as of this encounter: 1.651 m (5' 5\").    Weight as of this encounter: 100.9 kg (222 lb 7.1 oz).   # Moderate Malnutrition: based on nutrition assessment      # Financial/Environmental Concerns: none          PT/OT: yes  Diet: Snacks/Supplements Adult: Gelatein Plus; With Meals  Combination Diet Pureed Diet (level 4); Liquidized/Moderately Thick (level 3) (By tsp only, 1:1 RN assist/supervision, 2-3 hard swallows per tsp, alternate textures, hold if respiratory status declines/increased congested coughing, upright 30 min ...  parenteral nutrition - Clinimix E    DVT Prophylaxis: Heparin SQ  Spicer Catheter: Not present  Lines: PRESENT      CVC Double Lumen Right Internal jugular Non - valved (open ended);Tunneled-Site Assessment: WDL      Cardiac Monitoring: None  Code Status: Full Code    Disposition: Anticipated discharge pending improvement in oral intake, possibly to ILEANA Lester DO  Hospitalist Service  [facility]  Securely message with [employer] (more info)  Text page via [employer] Paging/Directory     Data reviewed today: I reviewed all new labs and imaging results over the last 24 hours.    Physical Exam   Temp: 99  F (37.2  C) Temp src: Oral BP: 116/57 Pulse: 75   Resp: 20 SpO2: 96 % O2 Device: None (Room air)    Vitals:    11/04/23 0000 11/12/23 0501 11/13/23 0206   Weight: 100.9 kg (222 lb 7.1 oz) 99.4 kg (219 lb 2.2 oz) 100.9 kg (222 lb 7.1 oz)     Vital Signs with " Ranges  Temp:  [98.4  F (36.9  C)-99  F (37.2  C)] 99  F (37.2  C)  Pulse:  [71-75] 75  Resp:  [20-22] 20  BP: ()/(43-79) 116/57  SpO2:  [95 %-96 %] 96 %  I/O last 3 completed shifts:  In: 155.52 [P.O.:90]  Out: 400 [Urine:400]  O2 requirements: no    Constitutional: Elderly female appears ill, generalized anasarca noted  HEENT: Eyes nonicteric, oral mucosa moist  Cardiovascular: RRR  Respiratory: crackles and rhales noted in bases, no coughing  Vascular: Trace BLE pitting edema  GI: Normoactive bowel sounds, appears nontender  Skin/Integumen: No rashes    Medications    dextrose      parenteral nutrition - Clinimix E 40 mL/hr at 11/12/23 2200        - MEDICATION INSTRUCTIONS for Dialysis Patients -   Does not apply See Admin Instructions    alteplase  2 mg Intracatheter Once in dialysis/CRRT    alteplase  2 mg Intracatheter Once in dialysis/CRRT    artificial saliva  2 spray Swish & Spit 4x Daily    B and C vitamin Complex with folic acid  5 mL Per Feeding Tube Daily    diltiazem  60 mg Oral or Feeding Tube Q6H NATACHA    epoetin stephie-epbx  10,000 Units Intravenous Once in dialysis/CRRT    sodium chloride (PF) 0.9%  10 mL Intracatheter Once in dialysis/CRRT    Followed by    heparin  1.3-2.6 mL Intracatheter Once in dialysis/CRRT    sodium chloride (PF) 0.9%  10 mL Intracatheter Once in dialysis/CRRT    Followed by    heparin  1.3-2.6 mL Intracatheter Once in dialysis/CRRT    heparin ANTICOAGULANT  5,000 Units Subcutaneous Q8H    hydrALAZINE  10 mg Oral or Feeding Tube Q8H    insulin aspart  1-6 Units Subcutaneous Q4H    levofloxacin  500 mg Intravenous Q48H    levothyroxine  100 mcg Oral or NG Tube QAM AC    lipids plant base  250 mL Intravenous Q24H    magnesium oxide  400 mg Oral Daily    - MEDICATION INSTRUCTIONS -   Does not apply Once    nystatin   Topical BID    pantoprazole  40 mg Per Feeding Tube QAM AC    Or    pantoprazole  40 mg Intravenous QAM AC    polyethylene glycol  17 g Oral or NG Tube Daily     senna-docusate  1 tablet Oral or NG Tube BID    sodium chloride (PF)  3 mL Intracatheter Q8H    sodium chloride (PF)  9 mL Intracatheter During Dialysis/CRRT (from stock)    sodium chloride (PF)  9 mL Intracatheter During Dialysis/CRRT (from stock)    sodium chloride 0.9%  250 mL Intravenous Once in dialysis/CRRT    sodium chloride 0.9%  300 mL Hemodialysis Machine Once    traZODone  25 mg Oral At Bedtime       Data   Recent Labs   Lab 11/13/23  0745 11/13/23  0534 11/13/23  0452 11/12/23  0825 11/12/23  0718 11/11/23  1155 11/11/23  0939 11/09/23  0003 11/08/23  2342   WBC  --  8.6  --   --   --   --   --   --   --    HGB  --  8.2*  --   --   --   --   --   --   --    MCV  --  90  --   --   --   --   --   --   --    PLT  --  210  --   --   --   --  196  --  181   NA  --  130*  --   --  130*  --  135   < >  --    POTASSIUM  --  4.4  --   --  4.1  --  3.9   < >  --    CHLORIDE  --  94*  --   --  93*  --  98   < >  --    CO2  --  24  --   --  24  --  27   < >  --    BUN  --  70.8*  --   --  60.8*  --  32.5*   < >  --    CR  --  5.94*  --   --  4.97*  --  3.78*   < >  --    ANIONGAP  --  12  --   --  13  --  10   < >  --    TERESA  --  8.4*  --   --  8.4*  --  8.2*   < >  --    * 110* 110*   < > 105*   < > 138*   < >  --    ALBUMIN  --  2.7*  --   --  2.9*  --  3.0*   < >  --    PROTTOTAL  --  5.6*  --   --  5.8*  --  5.9*   < >  --    BILITOTAL  --  0.3  --   --  0.3  --  0.4   < >  --    ALKPHOS  --  97  --   --  92  --  93   < >  --    ALT  --  15  --   --  16  --  16   < >  --    AST  --  27  --   --  24  --  31   < >  --     < > = values in this interval not displayed.       Imaging:   No results found for this or any previous visit (from the past 24 hour(s)).

## 2023-11-13 NOTE — PROGRESS NOTES
DIALYSIS PROCEDURE NOTE  Hepatitis status of previous patient on machine log was checked and verified ok to use with this patients hepatitis status.  Patient dialyzed for 3.5 hrs. on a K3 bath with a net fluid removal of  1L.  A BFR of 400 ml/min was obtained via a Right CVC using.    The treatment plan was discussed with Dr. Fish during the treatment.    Total heparin received during the treatment: 0 units.   Line flushed, clamped and capped with heparin 1:1000 2.3 mL (2300 units) per lumen    Meds  given: none   Complications: none      Person educated: patient. Knowledge base knowledgeable. Barriers to learning: none. Educated on procedure via verbal mode. Patient verbalized understanding.   ICEBOAT? Timeout performed pre-treatment  I: Patient was identified using 2 identifiers  C:  Consent Signed Yes  E: Equipment preventative maintenance is current and dialysis delivery system OK to use  B: Hepatitis B Surface Antigen: non reactive; Draw Date: 10/30/2023      Hepatitis B Surface Antibody: immune; Draw Date: 10/30/2023  O: Dialysis orders present and complete prior to treatment  A: Vascular access verified and assessed prior to treatment  T: Treatment was performed at a clinically appropriate time  ?: Patient was allowed to ask questions and address concerns prior to treatment  See Adult Hemodialysis flowsheet in [facility] for further details and post assessment.  Machine water alarm in place and functioning. Transducer pods intact and checked every 15min.   Pt assisted with repositioning throughout dialysis treatment.  Pt returned via bed.  Chlorine/Chloramine water system checked every 4 hours.      Post treatment report given to Liliane COREA RN regarding 1L of fluid removed.    Mary MARQUIS RN

## 2023-11-13 NOTE — PLAN OF CARE
[id number]    Orientations: A&O X3-4  Vitals/Pain: VSS on RA. LS dim. Pain controlled with PRN Tylenol X1.  Lines/Drains: R PIV SL. L Midline with TPN infusing at 40 ml/hr. R CVC, cdi. Purewick.   Skin/Wounds: R knee hematoma w/ blackened scabbed area in middle, low back incision well approximated, CDI, L forearm skin tear w/foam dressing CDI  GI/: Minimal UOP via purewick. HD run completed today for 3.5 hrs & 1L fluid removal. Incontinent B&B. +BS, +flatus, -BM. Tolerating pureed diet & mod thickened liquids with 1:1 supervision,   Labs: Abnormal/Trends, Electrolyte Replacement- Cr 5.94  Ambulation/Assist: A2 with Lift. Q2 T/R.   Plan: Increased PO intake vs possibilty for PEG tube placement. CTM

## 2023-11-13 NOTE — PROGRESS NOTES
"[facility]     Renal Progress Note       SHORTHAND KEY FOR MY NOTES:  c = with, s = without, p = after, a = before, x = except, asx = asymptomatic, tx = transplant or treatment, sx = symptoms or symptomatic, cx = canceled or culture, rxn = reaction, yday = yesterday, nl = normal, abx = antibiotics, fxn = function, dx = diagnosis, dz = disease, m/h = melena/hematochezia, c/d/l/ha = cramping/dizziness/lightheadedness/headache, d/c = discharge or diarrhea/constipation, f/c/n/v = fevers/chills/nausea/vomiting, cp/sob = chest pain/shortness of breath, tbv = total body volume, rxn = reaction, tdc = tunneled dialysis catheter, pta = prior to admission, hd = hemodialysis, pd = peritoneal dialysis, hhd = home hemodialysis, edw = estimated dry wt         Assessment/Plan:     1.  Oliguric LISA/CKD IIIa.  She is still HD-dependent.  Pt continues to have anephric rises in cr between runs.  She is TBV up, in part from hypoalbuminemic third-spacing, but it's not too bad.  She's making some urine, but not enough yet.  Pt states that the RN told her it's increasing daily.  Chemistries are fine.  A.  Follow labs, uo, sx.  B.  Avoid nephrotoxics.  C.  Continue MWF HD schedule for now.    2.  Enterobacter pneumonia.  Pt is breathing ok.  She is afebrile and is on abx.  A.  Continue levoflox at proper dosing for ESKD.    3.  Anemia. Pt's hb is stable in the 8s.  A.  EPO 10k qHD.  B.  Follow hb, clinically.    4.  FEN.  Na is a bit low today.  Phos is high.  Other electrolytes are all fine.  A.  Continue same diet.        Interval History:     Pt feels ok but doesn't like dialysis.  She thinks she may be urinating a bit more per day, according to \"what the nurses told me last night.\"          Medications and Allergies:      - MEDICATION INSTRUCTIONS for Dialysis Patients -   Does not apply See Admin Instructions    alteplase  2 mg Intracatheter Once in dialysis/CRRT    alteplase  2 mg Intracatheter Once in " "dialysis/CRRT    artificial saliva  2 spray Swish & Spit 4x Daily    B and C vitamin Complex with folic acid  5 mL Per Feeding Tube Daily    diltiazem ER COATED BEADS  180 mg Oral Daily    epoetin stephie-epbx  10,000 Units Intravenous Once in dialysis/CRRT    sodium chloride (PF) 0.9%  10 mL Intracatheter Once in dialysis/CRRT    Followed by    heparin  1.3-2.6 mL Intracatheter Once in dialysis/CRRT    sodium chloride (PF) 0.9%  10 mL Intracatheter Once in dialysis/CRRT    Followed by    heparin  1.3-2.6 mL Intracatheter Once in dialysis/CRRT    heparin ANTICOAGULANT  5,000 Units Subcutaneous Q8H    hydrALAZINE  10 mg Oral or Feeding Tube Q8H    insulin aspart  1-6 Units Subcutaneous Q4H    levofloxacin  500 mg Intravenous Q48H    levothyroxine  100 mcg Oral or NG Tube QAM AC    lipids plant base  250 mL Intravenous Q24H    magnesium oxide  400 mg Oral Daily    - MEDICATION INSTRUCTIONS -   Does not apply Once    nystatin   Topical BID    pantoprazole  40 mg Per Feeding Tube QAM AC    Or    pantoprazole  40 mg Intravenous QAM AC    polyethylene glycol  17 g Oral or NG Tube Daily    senna-docusate  1 tablet Oral or NG Tube BID    sodium chloride (PF)  3 mL Intracatheter Q8H    sodium chloride (PF)  9 mL Intracatheter During Dialysis/CRRT (from stock)    sodium chloride (PF)  9 mL Intracatheter During Dialysis/CRRT (from stock)    sodium chloride 0.9%  250 mL Intravenous Once in dialysis/CRRT    sodium chloride 0.9%  300 mL Hemodialysis Machine Once    traZODone  25 mg Oral At Bedtime     Allergies   Allergen Reactions    Fentanyl Nausea and Vomiting     VERY sensitive to most narcotics if not all.    Morphine And Related Nausea          Physical Exam:     Vitals were reviewed     , Blood pressure 136/64, pulse 75, temperature 98.7  F (37.1  C), temperature source Oral, resp. rate 20, height 1.651 m (5' 5\"), weight 100.9 kg (222 lb 7.1 oz), SpO2 96%.  Wt Readings from Last 3 Encounters:   11/13/23 100.9 kg (222 lb 7.1 " oz)   10/19/23 95 kg (209 lb 8 oz)   09/28/23 97.1 kg (214 lb)     Intake/Output Summary (Last 24 hours) at 11/13/2023 1022  Last data filed at 11/13/2023 0501  Gross per 24 hour   Intake 155.52 ml   Output 400 ml   Net -244.48 ml     GENERAL APPEARANCE: pleasant, NAD, alert  RESP: CTA B c good efforts  CV: RRR, nl S1/S2   ABDOMEN: morbid obesity  EXTREMITIES/SKIN: no significant ble edema  ACCESS:  RIJ TDC    Pt seen on HD.  Stable run expected. -2L UF goal.  Good BFR via RIJ TDC.         Data:     CBC RESULTS:     Recent Labs   Lab 11/13/23  0534 11/11/23  0939 11/08/23  2342   WBC 8.6  --   --    RBC 2.85*  --   --    HGB 8.2*  --   --    HCT 25.6*  --   --     196 181     Basic Metabolic Panel:  Recent Labs   Lab 11/13/23  0745 11/13/23  0534 11/13/23  0452 11/13/23  0048 11/12/23  2218 11/12/23  2108 11/12/23  0825 11/12/23  0718 11/11/23  1155 11/11/23  0939 11/10/23  1236 11/10/23  0705 11/09/23  1123 11/09/23  0525 11/08/23  0755 11/08/23  0546   NA  --  130*  --   --   --   --   --  130*  --  135  --  138  --  139  --  138   POTASSIUM  --  4.4  --   --   --   --   --  4.1  --  3.9  --  3.9  --  4.1  --  4.2   CHLORIDE  --  94*  --   --   --   --   --  93*  --  98  --  97*  --  99  --  97*   CO2  --  24  --   --   --   --   --  24  --  27  --  26  --  29  --  27   BUN  --  70.8*  --   --   --   --   --  60.8*  --  32.5*  --  36.1*  --  25.5*  --  55.9*   CR  --  5.94*  --   --   --   --   --  4.97*  --  3.78*  --  5.36*  --  3.75*  --  6.08*   * 110* 110* 114* 109* 116*   < > 105*   < > 138*   < > 104*   < > 106*   < > 98   TERESA  --  8.4*  --   --   --   --   --  8.4*  --  8.2*  --  8.6*  --  8.2*  --  8.2*    < > = values in this interval not displayed.     INRNo lab results found in last 7 days.   Attestation:   I have reviewed today's relevant vital signs, notes, medications, labs and imaging.    Wade Fish MD  [facility] - Nephrology  [phone]

## 2023-11-13 NOTE — PLAN OF CARE
"  Problem: Adult Inpatient Plan of Care  Goal: Plan of Care Review  Description: The Plan of Care Review/Shift note should be completed every shift.  The Outcome Evaluation is a brief statement about your assessment that the patient is improving, declining, or no change.  This information will be displayed automatically on your shift  note.  Outcome: Progressing  Flowsheets (Taken 11/12/2023 2250)  Outcome Evaluation: Poor appetite and poor activity tolerance continues.  Plan of Care Reviewed With:   patient   child  Overall Patient Progress: no change  Goal: Patient-Specific Goal (Individualized)  Description: You can add care plan individualizations to a care plan. Examples of Individualization might be:  \"Parent requests to be called daily at 9am for status\", \"I have a hard time hearing out of my right ear\", or \"Do not touch me to wake me up as it startles  me\".  Outcome: Progressing  Goal: Absence of Hospital-Acquired Illness or Injury  Outcome: Progressing  Intervention: Identify and Manage Fall Risk  Recent Flowsheet Documentation  Taken 11/12/2023 1600 by Teresa Li RN  Safety Promotion/Fall Prevention:   treat underlying cause   treat reversible contributory factors   supervised activity   safety round/check completed   room organization consistent   room near nurse's station   room door open   patient and family education   nonskid shoes/slippers when out of bed   lighting adjusted   increase visualization of patient   increased rounding and observation   clutter free environment maintained   assistive device/personal items within reach   activity supervised  Intervention: Prevent Skin Injury  Recent Flowsheet Documentation  Taken 11/12/2023 2200 by Teresa Li, RN  Body Position:   turned   log-rolled   left   side-lying 30 degrees   weight shifting   upper extremity elevated   lower extremity elevated  Taken 11/12/2023 2000 by Teresa Li, RN  Body Position: supine, head elevated  Taken " 11/12/2023 1800 by Teresa Li RN  Body Position:   turned   log-rolled   heels elevated   foot of bed elevated   side-lying 30 degrees   weight shifting   upper extremity elevated   right  Taken 11/12/2023 1600 by Teresa Li RN  Body Position:   turned   log-rolled   heels elevated   foot of bed elevated   left   side-lying 30 degrees   weight shifting   upper extremity elevated  Skin Protection:   tubing/devices free from skin contact   skin sealant/moisture barrier applied   silicone foam dressing in place   incontinence pads utilized   adhesive use limited  Device Skin Pressure Protection:   absorbent pad utilized/changed   adhesive use limited   tubing/devices free from skin contact  Intervention: Prevent Infection  Recent Flowsheet Documentation  Taken 11/12/2023 1600 by Teresa Li RN  Infection Prevention:   single patient room provided   rest/sleep promoted   hand hygiene promoted  Goal: Optimal Comfort and Wellbeing  Outcome: Progressing  Intervention: Provide Person-Centered Care  Recent Flowsheet Documentation  Taken 11/12/2023 1600 by Teresa Li RN  Trust Relationship/Rapport:   thoughts/feelings acknowledged   reassurance provided   questions encouraged   questions answered   empathic listening provided   emotional support provided   care explained   choices provided  Goal: Readiness for Transition of Care  Outcome: Progressing     Problem: Risk for Delirium  Goal: Optimal Coping  Outcome: Progressing  Intervention: Optimize Psychosocial Adjustment to Delirium  Recent Flowsheet Documentation  Taken 11/12/2023 1600 by Teresa Li RN  Supportive Measures:   self-responsibility promoted   self-care encouraged  Family/Support System Care: presence promoted  Goal: Improved Behavioral Control  Outcome: Progressing  Intervention: Prevent and Manage Agitation  Recent Flowsheet Documentation  Taken 11/12/2023 1600 by Teresa Li RN  Environment Familiarity/Consistency:    daily routine followed   familiar objects from home provided  Intervention: Minimize Safety Risk  Recent Flowsheet Documentation  Taken 11/12/2023 1600 by Teresa Li RN  Communication Enhancement Strategies:   one-step directions provided   healthcare instructions adapted   call light answered in person  Enhanced Safety Measures: room near unit station  Trust Relationship/Rapport:   thoughts/feelings acknowledged   reassurance provided   questions encouraged   questions answered   empathic listening provided   emotional support provided   care explained   choices provided  Goal: Improved Attention and Thought Clarity  Outcome: Progressing  Intervention: Maximize Cognitive Function  Recent Flowsheet Documentation  Taken 11/12/2023 1600 by Teresa Li RN  Sensory Stimulation Regulation:   quiet environment promoted   lighting decreased   care clustered  Reorientation Measures:   reorientation provided   calendar in view   clock in view   hearing device use encouraged  Goal: Improved Sleep  Outcome: Progressing  Intervention: Promote Sleep  Recent Flowsheet Documentation  Taken 11/12/2023 1600 by Teresa Li RN  Sleep/Rest Enhancement:   noise level reduced   awakenings minimized   comfort measures   regular sleep/rest pattern promoted     Problem: Spinal Surgery  Goal: Optimal Coping with Surgery  Outcome: Progressing  Intervention: Support Psychosocial Response to Surgery  Recent Flowsheet Documentation  Taken 11/12/2023 1600 by Teresa Li RN  Supportive Measures:   self-responsibility promoted   self-care encouraged  Family/Support System Care: presence promoted  Goal: Absence of Bleeding  Outcome: Progressing  Intervention: Monitor and Manage Bleeding  Recent Flowsheet Documentation  Taken 11/12/2023 1600 by Teresa Li RN  Bleeding Management: dressing monitored  Goal: Effective Bowel Elimination  Outcome: Progressing  Goal: Fluid and Electrolyte Balance  Outcome:  Progressing  Intervention: Monitor and Manage Fluid and Electrolyte Balance  Recent Flowsheet Documentation  Taken 11/12/2023 1600 by Teresa Li RN  Fluid/Electrolyte Management: electrolyte supplement adjusted  Goal: Optimal Functional Ability  Outcome: Progressing  Intervention: Optimize Functional Status  Recent Flowsheet Documentation  Taken 11/12/2023 2200 by Teresa Li RN  Activity Management: activity adjusted per tolerance  Positioning/Transfer Devices:   lift device utilized   in use   pillows  Taken 11/12/2023 1900 by Teresa Li RN  Activity Management: back to bed  Taken 11/12/2023 1858 by Teresa Li RN  Activity Management: sitting, edge of bed  Taken 11/12/2023 1800 by Teresa Li RN  Positioning/Transfer Devices:   in use   pillows   lift device utilized  Taken 11/12/2023 1600 by Teresa Li RN  Self-Care Promotion: independence encouraged  Activity Management:   activity adjusted per tolerance   activity encouraged   bedrest  Positioning/Transfer Devices:   in use   pillows   lift device utilized  Goal: Absence of Infection Signs and Symptoms  Outcome: Progressing  Intervention: Prevent or Manage Infection  Recent Flowsheet Documentation  Taken 11/12/2023 1600 by Teresa Li RN  Infection Prevention:   single patient room provided   rest/sleep promoted   hand hygiene promoted  Goal: Optimal Neurologic Function  Outcome: Progressing  Intervention: Optimize Neurologic Function  Recent Flowsheet Documentation  Taken 11/12/2023 2200 by Teresa Li RN  Body Position:   turned   log-rolled   left   side-lying 30 degrees   weight shifting   upper extremity elevated   lower extremity elevated  Taken 11/12/2023 2000 by Teresa Li RN  Body Position: supine, head elevated  Taken 11/12/2023 1800 by Teresa Li RN  Body Position:   turned   log-rolled   heels elevated   foot of bed elevated   side-lying 30 degrees   weight shifting   upper extremity  elevated   right  Taken 11/12/2023 1600 by Teresa Li RN  Body Position:   turned   log-rolled   heels elevated   foot of bed elevated   left   side-lying 30 degrees   weight shifting   upper extremity elevated  Pressure Reduction Devices:   positioning supports utilized   heel offloading device utilized  Goal: Optimal Pain Control and Function  Outcome: Progressing  Goal: Nausea and Vomiting Relief  Outcome: Progressing  Goal: Effective Oxygenation and Ventilation  Outcome: Progressing  Intervention: Optimize Oxygenation and Ventilation  Recent Flowsheet Documentation  Taken 11/12/2023 2200 by Teresa Li RN  Head of Bed (HOB) Positioning: HOB at 30 degrees  Taken 11/12/2023 2000 by Teresa Li RN  Head of Bed (HOB) Positioning: HOB at 60-90 degrees  Taken 11/12/2023 1800 by Teresa Li RN  Head of Bed (HOB) Positioning: HOB at 30 degrees  Taken 11/12/2023 1600 by Teresa Li RN  Airway/Ventilation Management:   pulmonary hygiene promoted   calming measures promoted  Head of Bed (HOB) Positioning: HOB at 30 degrees     Problem: Pain Acute  Goal: Optimal Pain Control and Function  Outcome: Progressing  Intervention: Prevent or Manage Pain  Recent Flowsheet Documentation  Taken 11/12/2023 1600 by Teresa Li RN  Sensory Stimulation Regulation:   quiet environment promoted   lighting decreased   care clustered  Sleep/Rest Enhancement:   noise level reduced   awakenings minimized   comfort measures   regular sleep/rest pattern promoted  Bowel Elimination Promotion: privacy promoted  Medication Review/Management:   medications reviewed   high-risk medications identified  Intervention: Optimize Psychosocial Wellbeing  Recent Flowsheet Documentation  Taken 11/12/2023 1600 by Teresa Li RN  Supportive Measures:   self-responsibility promoted   self-care encouraged     Problem: Comorbidity Management  Goal: Blood Glucose Levels Within Targeted Range  Outcome:  Progressing  Intervention: Monitor and Manage Glycemia  Recent Flowsheet Documentation  Taken 11/12/2023 1600 by Teresa Li RN  Glycemic Management: blood glucose monitored  Medication Review/Management:   medications reviewed   high-risk medications identified  Goal: Blood Pressure in Desired Range  Outcome: Progressing  Intervention: Maintain Blood Pressure Management  Recent Flowsheet Documentation  Taken 11/12/2023 1600 by Teresa Li RN  Medication Review/Management:   medications reviewed   high-risk medications identified     Problem: Pulmonary Impairment  Goal: Improved Activity Tolerance  Outcome: Progressing  Goal: Effective Airway Clearance  Outcome: Progressing  Goal: Optimal Gas Exchange  Outcome: Progressing     Problem: Hemodialysis  Goal: Safe, Effective Therapy Delivery  Outcome: Progressing  Intervention: Optimize Device Care and Function  Recent Flowsheet Documentation  Taken 11/12/2023 1600 by Teresa Li RN  Medication Review/Management:   medications reviewed   high-risk medications identified  Goal: Effective Tissue Perfusion  Outcome: Progressing  Goal: Absence of Infection Signs and Symptoms  Outcome: Progressing  Intervention: Prevent or Manage Infection  Recent Flowsheet Documentation  Taken 11/12/2023 1600 by Teresa Li RN  Infection Prevention:   single patient room provided   rest/sleep promoted   hand hygiene promoted     Problem: Malnutrition  Goal: Improved Nutritional Intake  Outcome: Progressing  Intervention: Promote and Optimize Oral Intake  Recent Flowsheet Documentation  Taken 11/12/2023 1600 by Teresa iL RN  Nutrition Interventions: supplemental foods provided  Oral Nutrition Promotion: rest periods promoted  Intervention: Optimize Nutrition Delivery  Recent Flowsheet Documentation  Taken 11/12/2023 1600 by Teresa Li RN  Nutrition Support Management:   weight trending reviewed   parenteral nutrition initiated   Goal Outcome  Evaluation:      Plan of Care Reviewed With: patient, child    Overall Patient Progress: no changeOverall Patient Progress: no change    Outcome Evaluation: Poor appetite and poor activity tolerance continues.

## 2023-11-13 NOTE — PLAN OF CARE
Problem: Malnutrition  Goal: Improved Nutritional Intake  Outcome: Progressing   Goal Outcome Evaluation:    Ongoing minimal PO intake. PPN continues, providing small amount of nutrition.     Nora Ferris RD, LD

## 2023-11-13 NOTE — CARE PLAN
[id number]  Orientations: A&Ox3-4, neuros intact  Vitals/Pain: VSS on RA,  pt denies pain  Tele: NSR  Lungs: PRN douneb given for exp wheezes  Lines/Drains: L midline w/ PPN infusing @ 40mL/hr and Lipids @20.8mL/hr , R chest tunneled cath for HD CDI, R  PIV SL,   Skin/Wounds: R knee hematoma w/ blackened scabbed area in middle, low back incision well approximated, CDI, L forearm skin tear w/foam dressing CDI  GI/: Minimal UOP via external cath. No BM on shift. Purree and thickened liquid diet w/ calorie count  Labs: Abnormal/Trends, Electrolyte Replacement- K 4.4, Mg 1.9, Phos 6.8, BG (114, 110)  Ambulation/Assist: turn and repo q2h  Sleep Quality: fair  Plan: hemodialysis on Monday, possible PEG tube placement (re discuss w/family)

## 2023-11-13 NOTE — PROGRESS NOTES
CLINICAL NUTRITION SERVICES - REASSESSMENT NOTE      RECOMMENDATIONS FOR MD/PROVIDER TO ORDER:   Kcal ct data (11/10-11/12): 3 day average PO intake = 333 kcal (20% minimum energy needs) and 24 g protein (30% minimum protein needs)  --> ongoing minimal PO intake + PPN providing minimal kcal, protein. Recommend FT placement if possible (ideal) or placement of central line for concentrated TPN. Defer nutrition POC to MD pending GOC     Recommendations Ordered by Registered Dietitian (RD):   Continue w/ PPN as ordered pending POC      Malnutrition:   % Weight Loss:  None noted (11/6)   % Intake:  </= 50% for >/= 5 days (severe malnutrition) (10/28)  Subcutaneous Fat Loss:  None observed (10/28)  Muscle Loss:  None observed (10/28)  Fluid Retention:  Trace to mild generalized edema    Malnutrition Diagnosis: Moderate malnutrition in the context of --  Acute illness or injury         EVALUATION OF PROGRESS TOWARD GOALS   Diet: Pureed Diet (level 4); Liquidized/Moderately Thick (level 3)   Snacks/Supplements Adult: Gelatein Plus; With Meals    11/9: SLP = Gradual progress in Tx. Rec: cautious po including Clear Liquid Diet; Liquidized/Moderately Thick (level 3)   11/12: SLP = Gradual progress in Tx. Recommend continue pureed diet (IDDSI 4) moderately thick liquids (IDDSI 3).     Intake/Tolerance:  Attempted to visit w/ pt this afternoon. Bed, pt gone from room.   Pt has been receiving 1-2 small meals/day + TID supplements per health touch. Bites to 50% intakes per nursing flow sheet.    Calorie Count =   11/10: 180 kcal and 20 g protein (1 meals, 2 supplements)  11/11: 135 kcal and 10 g protein (0 meals, 1 supplement)  11/12: 683 kcal and 42 g protein (2 meals, 3 supplements)    3 day average PO intake = 333 kcal (20% minimum energy needs) and 24 g protein (30% minimum protein needs)  PO + PPN = ~90% of min energy needs and 100% of min protein needs      Nutrition Support: Parenteral  Type of Access: Midline  Parenteral  Frequency: Continuous  Parenteral Regimen: Clinimix E (4.25/5) @ 83 mL/hr + 250 mL 20% lipids daily   Total Parenteral Provisions: 1992 mL, 1180 kcal (17 kcal/kg), 100 g CHO (GIR = 1.03), 85 g AA (1.3 g/kg protein), and 42% kcal from fat    11/10: PPN start @ goal  11/12: PPN held d/t fluid-overload --> later re-started @ 40 mL/hr  PPN currently @ 40 mL/hr + lipids = 827 kcal, 48 g Dex, 41 g AA        ASSESSED NUTRITION NEEDS: (11/6)  Dosing Weight:  67.9 kg (adjusted, based on 101.2 kg-- 11/2)  Estimated Energy Needs: 6117-8025 kcals (25-30 Kcal/Kg)  Justification: maintenance  Estimated Protein Needs:  grams protein (1.2-1.8 g pro/Kg)  Justification: dialysis  Estimated Fluid Needs: per provider pending fluid status         NEW FINDINGS:   General: A/O x3-4    Weight: wt trending up, fluid-overload noted   Date/Time Weight Weight Method   11/13/23 0206 100.9 kg (222 lb 7.1 oz) Bed scale   11/12/23 0501 99.4 kg (219 lb 2.2 oz) Bed scale   11/04/23 0000 100.9 kg (222 lb 7.1 oz) Bed scale   11/03/23 0600 102.6 kg (226 lb 3.1 oz) Bed scale   11/02/23 0525 101.2 kg (223 lb 1.7 oz) Bed scale   11/01/23 0400 105.9 kg (233 lb 7.5 oz) Bed scale   10/31/23 0400 104.1 kg (229 lb 6.4 oz) Bed scale   10/30/23 0600 107.8 kg (237 lb 9.6 oz) --   10/29/23 0502 111.4 kg (245 lb 11.2 oz) --   10/28/23 0600 106.2 kg (234 lb 3.2 oz) Bed scale   10/27/23 0200 103.7 kg (228 lb 11.2 oz) --   10/26/23 0400 102.2 kg (225 lb 3.2 oz) Bed scale   10/25/23 0626 94.8 kg (209 lb) --     I/O: Net IO Since Admission: 118.09 mL [11/13/23 0834].  3x BM yesterday, 3x on 11/11, 1x on 11/10, 1x on 11/9    Labs: reviewed  Component Value Date    (L) 11/13/2023   BUN 70.8 (H) 11/13/2023   CR 5.94 (H) 11/13/2023     Component Value Date    PHOS 6.8 (H) 11/13/2023    PHOS 6.1 (H) 11/12/2023    PHOS 4.7 (H) 11/11/2023    PHOS 6.1 (H) 11/10/2023    PHOS 4.7 (H) 11/09/2023      Lab 11/13/23  0745 11/13/23  0534 11/13/23  0452 11/13/23  0048  11/12/23  2218 11/12/23  2108   * 110* 110* 114* 109* 116*     Medications: reviewed    B and C vitamin Complex with folic acid  5 mL Per Feeding Tube Daily    insulin aspart  1-6 Units Subcutaneous Q4H    levofloxacin  500 mg Intravenous Q48H    levothyroxine  100 mcg Oral or NG Tube QAM AC    magnesium oxide  400 mg Oral Daily    pantoprazole  40 mg Intravenous QAM AC    polyethylene glycol  17 g Oral or NG Tube Daily    senna-docusate  1 tablet Oral or NG Tube BID     Skin: WOC following, assessed 11/8      Previous Goals:   PO intake + PPN to meet % of estimated nutrition needs  Evaluation: Met  Nutrition POC be determined w/in 72 hours  Evaluation: Not met    Previous Nutrition Diagnosis:   Inadequate protein-energy intake related to diet restriction, difficulty re-starting TF as evidenced by TF off x7 days, minimal PO intake w/ moderately thickened CLD, plans to start PPN   Evaluation: Improving        MALNUTRITION  % Weight Loss:  None noted (11/6)   % Intake:  </= 50% for >/= 5 days (severe malnutrition) (10/28)  Subcutaneous Fat Loss:  None observed (10/28)  Muscle Loss:  None observed (10/28)  Fluid Retention:  Trace to mild generalized edema    Malnutrition Diagnosis: Moderate malnutrition in the context of --  Acute illness or injury        CURRENT NUTRITION DIAGNOSIS  Inadequate oral intake related to poor appetite, dysphagia as evidenced by kcal ct meeting 20% minimum energy needs and 30% minimum protein needs, continued supplemental PPN        INTERVENTIONS  Recommendations / Nutrition Prescription  Continue w/ PPN as ordered pending POC   Defer nutrition POC to MD pending [facility]    Implementation  Collaboration with other providers  Parenteral Nutrition/IV Fluids     Goals  PO + PPN to meet % of estimated nutrition needs   Nutrition POC be determined w/in 72 hours        MONITORING AND EVALUATION:  Progress towards goals will be monitored and evaluated per protocol and Practice  Guidelines      Nora Ferris RD, LD  Pager: [phone]

## 2023-11-14 ENCOUNTER — APPOINTMENT (OUTPATIENT)
Dept: OCCUPATIONAL THERAPY | Facility: CLINIC | Age: 83
DRG: 453 | End: 2023-11-14
Attending: NEUROLOGICAL SURGERY
Payer: COMMERCIAL

## 2023-11-14 ENCOUNTER — APPOINTMENT (OUTPATIENT)
Dept: SPEECH THERAPY | Facility: CLINIC | Age: 83
DRG: 453 | End: 2023-11-14
Attending: NEUROLOGICAL SURGERY
Payer: COMMERCIAL

## 2023-11-14 ENCOUNTER — APPOINTMENT (OUTPATIENT)
Dept: PHYSICAL THERAPY | Facility: CLINIC | Age: 83
DRG: 453 | End: 2023-11-14
Attending: NEUROLOGICAL SURGERY
Payer: COMMERCIAL

## 2023-11-14 LAB
ALBUMIN SERPL BCG-MCNC: 3 G/DL (ref 3.5–5.2)
ANION GAP SERPL CALCULATED.3IONS-SCNC: 10 MMOL/L (ref 7–15)
BUN SERPL-MCNC: 40 MG/DL (ref 8–23)
CALCIUM SERPL-MCNC: 8.2 MG/DL (ref 8.8–10.2)
CHLORIDE SERPL-SCNC: 96 MMOL/L (ref 98–107)
CREAT SERPL-MCNC: 4.01 MG/DL (ref 0.51–0.95)
DEPRECATED HCO3 PLAS-SCNC: 27 MMOL/L (ref 22–29)
EGFRCR SERPLBLD CKD-EPI 2021: 11 ML/MIN/1.73M2
GLUCOSE BLDC GLUCOMTR-MCNC: 100 MG/DL (ref 70–99)
GLUCOSE BLDC GLUCOMTR-MCNC: 104 MG/DL (ref 70–99)
GLUCOSE BLDC GLUCOMTR-MCNC: 105 MG/DL (ref 70–99)
GLUCOSE BLDC GLUCOMTR-MCNC: 106 MG/DL (ref 70–99)
GLUCOSE BLDC GLUCOMTR-MCNC: 108 MG/DL (ref 70–99)
GLUCOSE BLDC GLUCOMTR-MCNC: 112 MG/DL (ref 70–99)
GLUCOSE BLDC GLUCOMTR-MCNC: 118 MG/DL (ref 70–99)
GLUCOSE BLDC GLUCOMTR-MCNC: 120 MG/DL (ref 70–99)
GLUCOSE SERPL-MCNC: 114 MG/DL (ref 70–99)
PHOSPHATE SERPL-MCNC: 4.8 MG/DL (ref 2.5–4.5)
PLATELET # BLD AUTO: 129 10E3/UL (ref 150–450)
POTASSIUM SERPL-SCNC: 4.5 MMOL/L (ref 3.4–5.3)
SODIUM SERPL-SCNC: 133 MMOL/L (ref 135–145)

## 2023-11-14 PROCEDURE — 36415 COLL VENOUS BLD VENIPUNCTURE: CPT | Performed by: INTERNAL MEDICINE

## 2023-11-14 PROCEDURE — 250N000013 HC RX MED GY IP 250 OP 250 PS 637: Performed by: INTERNAL MEDICINE

## 2023-11-14 PROCEDURE — 82040 ASSAY OF SERUM ALBUMIN: CPT | Performed by: INTERNAL MEDICINE

## 2023-11-14 PROCEDURE — 99207 PR CDG-CUT & PASTE-POTENTIAL IMPACT ON LEVEL: CPT | Performed by: STUDENT IN AN ORGANIZED HEALTH CARE EDUCATION/TRAINING PROGRAM

## 2023-11-14 PROCEDURE — 97530 THERAPEUTIC ACTIVITIES: CPT | Mod: GO

## 2023-11-14 PROCEDURE — 97535 SELF CARE MNGMENT TRAINING: CPT | Mod: GO

## 2023-11-14 PROCEDURE — 97530 THERAPEUTIC ACTIVITIES: CPT | Mod: GP

## 2023-11-14 PROCEDURE — 250N000009 HC RX 250: Mod: JZ | Performed by: HOSPITALIST

## 2023-11-14 PROCEDURE — C9113 INJ PANTOPRAZOLE SODIUM, VIA: HCPCS | Mod: JZ | Performed by: NEUROLOGICAL SURGERY

## 2023-11-14 PROCEDURE — 250N000013 HC RX MED GY IP 250 OP 250 PS 637: Performed by: HOSPITALIST

## 2023-11-14 PROCEDURE — 120N000001 HC R&B MED SURG/OB

## 2023-11-14 PROCEDURE — 99233 SBSQ HOSP IP/OBS HIGH 50: CPT | Performed by: STUDENT IN AN ORGANIZED HEALTH CARE EDUCATION/TRAINING PROGRAM

## 2023-11-14 PROCEDURE — 250N000013 HC RX MED GY IP 250 OP 250 PS 637: Performed by: NURSE PRACTITIONER

## 2023-11-14 PROCEDURE — 85049 AUTOMATED PLATELET COUNT: CPT | Performed by: NEUROLOGICAL SURGERY

## 2023-11-14 PROCEDURE — 250N000011 HC RX IP 250 OP 636: Performed by: INTERNAL MEDICINE

## 2023-11-14 PROCEDURE — 250N000013 HC RX MED GY IP 250 OP 250 PS 637: Performed by: STUDENT IN AN ORGANIZED HEALTH CARE EDUCATION/TRAINING PROGRAM

## 2023-11-14 PROCEDURE — 250N000009 HC RX 250: Performed by: NEUROLOGICAL SURGERY

## 2023-11-14 PROCEDURE — 250N000011 HC RX IP 250 OP 636: Mod: JZ | Performed by: NEUROLOGICAL SURGERY

## 2023-11-14 PROCEDURE — 999N000040 HC STATISTIC CONSULT NO CHARGE VASC ACCESS

## 2023-11-14 PROCEDURE — B4185 PARENTERAL SOL 10 GM LIPIDS: HCPCS | Mod: JZ | Performed by: HOSPITALIST

## 2023-11-14 PROCEDURE — 92526 ORAL FUNCTION THERAPY: CPT | Mod: GN | Performed by: SPEECH-LANGUAGE PATHOLOGIST

## 2023-11-14 RX ORDER — B COMPLEX C NO.10/FOLIC ACID 900MCG/5ML
5 LIQUID (ML) ORAL DAILY
Status: DISCONTINUED | OUTPATIENT
Start: 2023-11-15 | End: 2023-12-01 | Stop reason: HOSPADM

## 2023-11-14 RX ORDER — LEVOFLOXACIN 5 MG/ML
500 INJECTION, SOLUTION INTRAVENOUS
Status: COMPLETED | OUTPATIENT
Start: 2023-11-15 | End: 2023-11-15

## 2023-11-14 RX ADMIN — HEPARIN SODIUM 5000 UNITS: 5000 INJECTION, SOLUTION INTRAVENOUS; SUBCUTANEOUS at 08:30

## 2023-11-14 RX ADMIN — HEPARIN SODIUM 5000 UNITS: 5000 INJECTION, SOLUTION INTRAVENOUS; SUBCUTANEOUS at 17:09

## 2023-11-14 RX ADMIN — HYDRALAZINE HYDROCHLORIDE 10 MG: 10 TABLET ORAL at 13:20

## 2023-11-14 RX ADMIN — Medication 2 SPRAY: at 13:18

## 2023-11-14 RX ADMIN — DILTIAZEM HYDROCHLORIDE 180 MG: 180 CAPSULE, COATED, EXTENDED RELEASE ORAL at 08:30

## 2023-11-14 RX ADMIN — SENNOSIDES AND DOCUSATE SODIUM 1 TABLET: 8.6; 5 TABLET ORAL at 22:19

## 2023-11-14 RX ADMIN — LEVOTHYROXINE SODIUM 100 MCG: 100 TABLET ORAL at 06:33

## 2023-11-14 RX ADMIN — HEPARIN SODIUM 5000 UNITS: 5000 INJECTION, SOLUTION INTRAVENOUS; SUBCUTANEOUS at 00:30

## 2023-11-14 RX ADMIN — Medication 400 MG: at 08:30

## 2023-11-14 RX ADMIN — OLIVE OIL AND SOYBEAN OIL 250 ML: 16; 4 INJECTION, EMULSION INTRAVENOUS at 20:44

## 2023-11-14 RX ADMIN — ASCORBIC ACID, VITAMIN A PALMITATE, CHOLECALCIFEROL, THIAMINE HYDROCHLORIDE, RIBOFLAVIN-5 PHOSPHATE SODIUM, PYRIDOXINE HYDROCHLORIDE, NIACINAMIDE, DEXPANTHENOL, ALPHA-TOCOPHEROL ACETATE, VITAMIN K1, FOLIC ACID, BIOTIN, CYANOCOBALAMIN: 200; 3300; 200; 6; 3.6; 6; 40; 15; 10; 150; 600; 60; 5 INJECTION, SOLUTION INTRAVENOUS at 20:45

## 2023-11-14 RX ADMIN — Medication 2 SPRAY: at 08:31

## 2023-11-14 RX ADMIN — Medication 2 SPRAY: at 21:53

## 2023-11-14 RX ADMIN — NYSTATIN: 100000 CREAM TOPICAL at 20:46

## 2023-11-14 RX ADMIN — PANTOPRAZOLE SODIUM 40 MG: 40 INJECTION, POWDER, FOR SOLUTION INTRAVENOUS at 06:33

## 2023-11-14 RX ADMIN — MICONAZOLE NITRATE: 2 POWDER TOPICAL at 08:31

## 2023-11-14 RX ADMIN — HYDRALAZINE HYDROCHLORIDE 10 MG: 10 TABLET ORAL at 06:33

## 2023-11-14 RX ADMIN — TRAZODONE HYDROCHLORIDE 25 MG: 50 TABLET ORAL at 22:19

## 2023-11-14 RX ADMIN — Medication 5 ML: at 17:09

## 2023-11-14 RX ADMIN — ACETAMINOPHEN 650 MG: 325 TABLET, FILM COATED ORAL at 08:30

## 2023-11-14 RX ADMIN — HYDRALAZINE HYDROCHLORIDE 10 MG: 10 TABLET ORAL at 22:19

## 2023-11-14 RX ADMIN — NYSTATIN: 100000 CREAM TOPICAL at 08:44

## 2023-11-14 RX ADMIN — ACETAMINOPHEN 650 MG: 325 TABLET, FILM COATED ORAL at 22:19

## 2023-11-14 ASSESSMENT — ACTIVITIES OF DAILY LIVING (ADL)
ADLS_ACUITY_SCORE: 48
ADLS_ACUITY_SCORE: 44
ADLS_ACUITY_SCORE: 48
ADLS_ACUITY_SCORE: 44
ADLS_ACUITY_SCORE: 48

## 2023-11-14 NOTE — PROGRESS NOTES
[facility]    Hospitalist Progress Note    Interval History   Patient more awake and alert today. She denies pain, still some coughing noted by nursing. She is not eating much but doesn't really give me a reason why. She is aware of the diet restrictions. Spoke with SLP and her daughter over the phone about next steps.    Assessment & Plan   Summary: Mya Hui is a 83 year old female with PMH Scoliosis, Lumbar radiculopathy, HTN, HLP, DM2, Hypothyroidism, CKD stage 3, Morbid obesity, GERD, Sliding hiatal hernia, Overactive bladder, OA, Chronic right shoulder pain/rotator cuff arthropathy, Migraines, Asymptomatic carotid artery stenosis who was admitted on 10/25/2023 with an elective T11 to pelvis robotic decompression and fusion.  Surgery was performed on 10/25/23 under general anesthesia.  Surgery was complicated with development of arrhythmia with significant hypotension and noted 3.5 L blood loss per Op Note and Anesthesia postprocedure evaluation note.  Patient received IV fluids, 6 units of pRBCs and 2 units of FFP intra-op.  ST changes were noted intraoperatively.  Patient remained intubated and started on Levophed and was transferred to the ICU due to hemorrhagic shock.  She required vasopressor support with Levophed until 10/27.   Patient remained intubated until the morning of 11/2 and has been on supplemental O2 per Oxymask currently at 4 L/m.   Patient received additional 2 units FFP, 10 units of cryo and 1 unit of PLT. Transferred to hospitalist on 11/3.    Post-op hemorrhagic shock, resolved  Shocked liver-resolved  Type 2 MI due to hemorrhagic shock, improved  Surgery was complicated with development of arrhythmia with significant hypotension and noted 3.5 L blood loss per Op Note and Anesthesia postprocedure evaluation note.  Patient received IV fluids, 6 units of pRBCs and 2 units of FFP intra-op. Patient received additional 2 units FFP, 10 units of cryo and 1 unit of  PLT while in the ICU. LFTs continue to improve 11/5-11/7.    Encephalopathy, multifactorial, improving  Incidental finding of left basal ganglia restricted diffusion  Patient was noted to be encephalopathic for which Vascular Neurology was consulted.  Brain MRI was completed and noted a left basal ganglia small focus of restricted diffusion but not a cause for encephalopathy and felt it was likely multifactorial.  EEG monitor was completed and negative and subsequently stopped.  Suspect due to uremic encephalopathy and sepsis.   Encephalopathy improved 11/6  Delirium prevention:   - Reorient patient at each interaction.  - Keep room lights on and blinds open during day (8am-8pm), low lights 8pm-8am.  - Minimize interruptions of sleep at night (consolidate vitals, nursing assessments, medications).  - Avoid sedating medications as able.    - Vascular Neurology recommended repeat brain MRI in 2-3 months.      LISA/ATN requiring hemodialysis  Anion gap metabolic acidosis due to above  CKD stage 3a  Hypoalbuminemia  Anasarca  Baseline creatinine between 1.1-1.4 with GFR in the 40-50's.    Patient had been developed fluid overload and was treated with lasix drip with good output but developed anephric rising creatinine, BUN and developed worsening acidosis.  Due to development of LISA-D Nephrology was consulted and tunnel dialysis catheter placed 10/30 and has been undergoing HD Mon, Wed, Fri (last session was today, 11/3/2023).     Troponin were trended and minimally elevated.  ECHO completed and without any abnormalities (LV normal size and function with EF of 65-70%.  RV normal size, function and structure.  No valve disease).  - Continue dialysis, now on MWF schedule. Will need outpatient dialysis as we await recovery of renal function    Aspiration/ventilator associated PNA (Enterobacter, stenotrophomonas and E. Coli)  Acute hypoxic respiratory failure resolved  Leukocytosis due to above  Extubated 11/2. Patient has  also developed aspiration/ventilator associated pneumonia with sputum Cx growing Enterobacter, stenotrophomonas and E. coli and was initially started on Zoysn and switched to Meropenem on 11/1/2023.  Currently continued on Meropenem and awaiting final cultures. Procal 3. Sputum cultures growing Enterobacter, E coli, Stenotrophomonas.     - Continue Levofloxacin, likely 14 day course ending 11/14  - Encourage use of IS/Flutter valve.      Dysphagia  Moderate malnutrition  Failed SLP assessment. Bedside feeding tube placement coiling due to hiatal hernia. Note improvement in dysphagia on 11/5, upgraded to pureed diet. Discussed with SLP, hold off tube feed placement while patient's dysphagia is improving, consider if improvement stalls or oral intake remains below target.    - Plan for video swallow tomorrow. This was address diet plan for discharge.  - Transition meds to oral  - pureed diet with moderately thick liquids with PPN    Scoliosis  Lumbar radiculopathy  S/p T11 to pelvis robotic decompression and fusion  - Neurosurgery is managing.   - DVT ppx  - Pain control  - Chest PT  - Neuro checks q4h   - PT/OT    HTN  - transition to long acting dilt today. Continue hydralazine  - holding PTA lisinopril for ATN    HLP: Hold PTA pravastatin 40 mg/d.      Type 2 DM, controlled  Stress induced hyperglycemia  - no insulin needs recently, possible due to antibiotic use    Hypothyroidism: PTA synthroid pending feeding tube placement    GERD  Sliding hiatal hernia  - PTA PPI    OA  Chronic right shoulder pain/rotator cuff arthropathy  Chronic Pain  - Pain/analgesic management per Neurosurgery.      Obesity   Suspected ANAYELI  Body mass index is 37.64 kg/m .  Increase in all-cause morbidity and mortality.   - Follow up with PCP regarding ongoing management.    - Monitor O2 saturations.    - Recommend outpatient sleep study.      Overactive bladder: Hold PTA Ditropan XL 10 mg/d.      Migraines: Pain/analgesic management per  "Neurosurgery.      Asymptomatic carotid artery stenosis.    NSVT  Hypomagnesemia   Plan  - monitor on telemetry with close electrolyte management  - mag oxide  - continue telemetry    Clinically Significant Risk Factors              # Hypoalbuminemia: Lowest albumin = 2.5 g/dL at 11/1/2023  4:19 AM, will monitor as appropriate   # Thrombocytopenia: Lowest platelets = 129 in last 2 days, will monitor for bleeding   # Hypertension: Noted on problem list        # Obesity: Estimated body mass index is 36.69 kg/m  as calculated from the following:    Height as of this encounter: 1.651 m (5' 5\").    Weight as of this encounter: 100 kg (220 lb 7.4 oz).   # Moderate Malnutrition: based on nutrition assessment      # Financial/Environmental Concerns: none          PT/OT: yes  Diet: Snacks/Supplements Adult: Gelatein Plus; With Meals  Combination Diet Pureed Diet (level 4); Liquidized/Moderately Thick (level 3) (By tsp only, 1:1 RN assist/supervision, 2-3 hard swallows per tsp, alternate textures, hold if respiratory status declines/increased congested coughing, upright 30 min ...  parenteral nutrition - Clinimix E  parenteral nutrition - Clinimix E    DVT Prophylaxis: Heparin SQ  Spicer Catheter: Not present  Lines: PRESENT      CVC Double Lumen Right Internal jugular Non - valved (open ended);Tunneled-Site Assessment: WDL      Cardiac Monitoring: None  Code Status: Full Code    Disposition: Anticipated discharge pending improvement in oral intake, possibly to LTACH    Nivia Lester DO  Hospitalist Service  [facility]  Securely message with [employer] (more info)  Text page via [employer] Paging/Directory     Data reviewed today: I reviewed all new labs and imaging results over the last 24 hours. I spoke with nursing, SLP, pharmacy, nephrology, nutrition and family regarding her care.    Medical Decision Making       55 MINUTES SPENT BY ME on the date of service doing chart review, history, exam, " documentation & further activities per the note.       Physical Exam   Temp: 97.4  F (36.3  C) Temp src: Oral BP: (!) 146/71 Pulse: 76   Resp: 18 SpO2: 96 % O2 Device: None (Room air)    Vitals:    11/12/23 0501 11/13/23 0206 11/14/23 0903   Weight: 99.4 kg (219 lb 2.2 oz) 100.9 kg (222 lb 7.1 oz) 100 kg (220 lb 7.4 oz)     Vital Signs with Ranges  Temp:  [97.4  F (36.3  C)-99  F (37.2  C)] 97.4  F (36.3  C)  Pulse:  [67-82] 76  Resp:  [16-26] 18  BP: ()/(57-79) 146/71  SpO2:  [95 %-97 %] 96 %  I/O last 3 completed shifts:  In: 942   Out: 1000 [Other:1000]  O2 requirements: no    Constitutional: Elderly female appears ill, generalized anasarca noted  HEENT: Eyes nonicteric, oral mucosa moist  Cardiovascular: RRR  Respiratory: crackles and rhales noted in bases, no coughing  Vascular: Trace BLE pitting edema  GI: Normoactive bowel sounds, appears nontender  Skin/Integumen: No rashes    Medications    dextrose      parenteral nutrition - Clinimix E      parenteral nutrition - Clinimix E 40 mL/hr at 11/13/23 2116        - MEDICATION INSTRUCTIONS for Dialysis Patients -   Does not apply See Admin Instructions    alteplase  2 mg Intracatheter Once in dialysis/CRRT    alteplase  2 mg Intracatheter Once in dialysis/CRRT    artificial saliva  2 spray Swish & Spit 4x Daily    B and C vitamin Complex with folic acid  5 mL Per Feeding Tube Daily    diltiazem ER COATED BEADS  180 mg Oral Daily    heparin ANTICOAGULANT  5,000 Units Subcutaneous Q8H    hydrALAZINE  10 mg Oral or Feeding Tube Q8H    insulin aspart  1-6 Units Subcutaneous Q4H    levofloxacin  500 mg Intravenous Q48H    levothyroxine  100 mcg Oral or NG Tube QAM AC    lipids plant base  250 mL Intravenous Q24H    magnesium oxide  400 mg Oral Daily    nystatin   Topical BID    pantoprazole  40 mg Per Feeding Tube QAM AC    Or    pantoprazole  40 mg Intravenous QAM AC    polyethylene glycol  17 g Oral or NG Tube Daily    senna-docusate  1 tablet Oral or NG Tube  BID    sodium chloride (PF)  3 mL Intracatheter Q8H    traZODone  25 mg Oral At Bedtime       Data   Recent Labs   Lab 11/14/23  0815 11/14/23  0614 11/14/23  0611 11/14/23  0312 11/13/23  0745 11/13/23  0534 11/12/23  0825 11/12/23  0718 11/11/23  1155 11/11/23  0939   WBC  --   --   --   --   --  8.6  --   --   --   --    HGB  --   --   --   --   --  8.2*  --   --   --   --    MCV  --   --   --   --   --  90  --   --   --   --    PLT  --  129*  --   --   --  210  --   --   --  196   NA  --   --   --   --   --  130*  --  130*  --  135   POTASSIUM  --   --   --   --   --  4.4  --  4.1  --  3.9   CHLORIDE  --   --   --   --   --  94*  --  93*  --  98   CO2  --   --   --   --   --  24  --  24  --  27   BUN  --   --   --   --   --  70.8*  --  60.8*  --  32.5*   CR  --   --   --   --   --  5.94*  --  4.97*  --  3.78*   ANIONGAP  --   --   --   --   --  12  --  13  --  10   TERESA  --   --   --   --   --  8.4*  --  8.4*  --  8.2*   *  --  100* 120*   < > 110*   < > 105*   < > 138*   ALBUMIN  --   --   --   --   --  2.7*  --  2.9*  --  3.0*   PROTTOTAL  --   --   --   --   --  5.6*  --  5.8*  --  5.9*   BILITOTAL  --   --   --   --   --  0.3  --  0.3  --  0.4   ALKPHOS  --   --   --   --   --  97  --  92  --  93   ALT  --   --   --   --   --  15  --  16  --  16   AST  --   --   --   --   --  27  --  24  --  31    < > = values in this interval not displayed.       Imaging:   No results found for this or any previous visit (from the past 24 hour(s)).

## 2023-11-14 NOTE — PLAN OF CARE
Goal Outcome Evaluation:      Plan of Care Reviewed With: patient      Shift: 11/13-11/14  [id number]    Orientations: A&O X3. Forgetive  Vitals/Pain: VSS on RA. Denies pain.  Lines/Drains: R PIV SL. L Midline with TPN infusing at 40 ml/hr. R CVC-CDI.    Skin/Wounds: R knee hematoma w/ blackened scabbed area in middle, low back incision well approximated, CDI, L forearm skin tear w/foam dressing CDI  GI/: Incontinent B&B. +BS, +flatus.+BM   Ambulation/Assist: A2 with Lift. Q2 T/R.   Other Import Info: Hemodialysis M-W-F.

## 2023-11-14 NOTE — PLAN OF CARE
A/Ox4, intermittently confused. VSS on RA. LS clear/diminished. +bs, +flatus, -bm. Oliguric on HD. Incontinent of both bowel and bladder.  Surgical incision CHIKI with steri strips. Mepilex to left arm skin tear, and mepilex to skin tear on back. Generalized edema. Tolerating diet, but has poor appetite. PPN infusing. Turn and Repo. Assist x2.

## 2023-11-14 NOTE — PROGRESS NOTES
"Neurosurgery Progress     Dx:     POD #20 s/p T11-P    Neuro stable.     TODAY'S PLAN:     -Advance activity as tolerated.  -Continue supportive and symptomatic treatment.  -Continue physical therapy.  -Pain control measures.  -Advance diet as tolerated.  -Routine wound care.    In the event that patient's symptoms worsen or change we would appreciate being contacted. We did discuss signs of a worsening problem that she should seek being evaluated.     We did review the above information with the patient whom agrees with the plan and did verbalize understanding.   ________________________________________________________________     Ms. Hui overall feels well and denies any significant discomfort. Much more alert today.     BP (!) 146/71 (BP Location: Right arm)   Pulse 76   Temp 97.4  F (36.3  C) (Oral)   Resp 18   Ht 1.651 m (5' 5\")   Wt 100 kg (220 lb 7.4 oz)   LMP  (LMP Unknown)   SpO2 96%   BMI 36.69 kg/m       Patient examined in room 3310. She appears comfortable and in no apparent distress. She is moving all of her extremities well.   CN II-XII intact, alert and appropriate with conversation. Follows all commands. Much more alert today.   Bilateral upper and lower extremities with appropriate strength. Deep tendon reflexes within normal limits throughout. Sensation is also intact throughout. She does have an acceptable post-operative range of motion.   Her incision is absent for concerning edema, erythema or drainage.   Bandage is clean, dry and intact.   Calves soft and non-tender.     All pertinent labs and updated imaging reviewed in EPIC.     Cliff Rai PA-C   Neurosurgery   [phone] (P)       "

## 2023-11-14 NOTE — PROGRESS NOTES
Notes, labs, vitals reviewed.    LISA/CKD.  Pt's labs not yet drawn so we will order them now to assess anephric rises.  Orders placed for HD tmrw.  Anemia.  Hb is stable in the 8s. Give EPO 10k qHD.  Thrombocytopenia.  Plts trending lower over the past few days but were lower earlier this admission.

## 2023-11-15 ENCOUNTER — APPOINTMENT (OUTPATIENT)
Dept: PHYSICAL THERAPY | Facility: CLINIC | Age: 83
DRG: 453 | End: 2023-11-15
Attending: NEUROLOGICAL SURGERY
Payer: COMMERCIAL

## 2023-11-15 ENCOUNTER — APPOINTMENT (OUTPATIENT)
Dept: OCCUPATIONAL THERAPY | Facility: CLINIC | Age: 83
DRG: 453 | End: 2023-11-15
Attending: NEUROLOGICAL SURGERY
Payer: COMMERCIAL

## 2023-11-15 ENCOUNTER — APPOINTMENT (OUTPATIENT)
Dept: SPEECH THERAPY | Facility: CLINIC | Age: 83
DRG: 453 | End: 2023-11-15
Attending: NEUROLOGICAL SURGERY
Payer: COMMERCIAL

## 2023-11-15 ENCOUNTER — APPOINTMENT (OUTPATIENT)
Dept: GENERAL RADIOLOGY | Facility: CLINIC | Age: 83
DRG: 453 | End: 2023-11-15
Attending: STUDENT IN AN ORGANIZED HEALTH CARE EDUCATION/TRAINING PROGRAM
Payer: COMMERCIAL

## 2023-11-15 DIAGNOSIS — Z98.1 S/P LUMBAR FUSION: Primary | ICD-10-CM

## 2023-11-15 LAB
ALBUMIN SERPL BCG-MCNC: 3.1 G/DL (ref 3.5–5.2)
ANION GAP SERPL CALCULATED.3IONS-SCNC: 12 MMOL/L (ref 7–15)
BUN SERPL-MCNC: 47.9 MG/DL (ref 8–23)
CALCIUM SERPL-MCNC: 8.8 MG/DL (ref 8.8–10.2)
CHLORIDE SERPL-SCNC: 96 MMOL/L (ref 98–107)
CREAT SERPL-MCNC: 4.62 MG/DL (ref 0.51–0.95)
DEPRECATED HCO3 PLAS-SCNC: 25 MMOL/L (ref 22–29)
EGFRCR SERPLBLD CKD-EPI 2021: 9 ML/MIN/1.73M2
ERYTHROCYTE [DISTWIDTH] IN BLOOD BY AUTOMATED COUNT: 16.4 % (ref 10–15)
GLUCOSE BLDC GLUCOMTR-MCNC: 106 MG/DL (ref 70–99)
GLUCOSE BLDC GLUCOMTR-MCNC: 112 MG/DL (ref 70–99)
GLUCOSE BLDC GLUCOMTR-MCNC: 120 MG/DL (ref 70–99)
GLUCOSE BLDC GLUCOMTR-MCNC: 122 MG/DL (ref 70–99)
GLUCOSE BLDC GLUCOMTR-MCNC: 123 MG/DL (ref 70–99)
GLUCOSE BLDC GLUCOMTR-MCNC: 149 MG/DL (ref 70–99)
GLUCOSE SERPL-MCNC: 120 MG/DL (ref 70–99)
HCT VFR BLD AUTO: 29.7 % (ref 35–47)
HGB BLD-MCNC: 9.1 G/DL (ref 11.7–15.7)
MCH RBC QN AUTO: 28.8 PG (ref 26.5–33)
MCHC RBC AUTO-ENTMCNC: 30.6 G/DL (ref 31.5–36.5)
MCV RBC AUTO: 94 FL (ref 78–100)
PHOSPHATE SERPL-MCNC: 5.7 MG/DL (ref 2.5–4.5)
PLATELET # BLD AUTO: 211 10E3/UL (ref 150–450)
POTASSIUM SERPL-SCNC: 4.8 MMOL/L (ref 3.4–5.3)
RBC # BLD AUTO: 3.16 10E6/UL (ref 3.8–5.2)
SODIUM SERPL-SCNC: 133 MMOL/L (ref 135–145)
WBC # BLD AUTO: 7.1 10E3/UL (ref 4–11)

## 2023-11-15 PROCEDURE — 250N000011 HC RX IP 250 OP 636: Performed by: INTERNAL MEDICINE

## 2023-11-15 PROCEDURE — 250N000011 HC RX IP 250 OP 636: Mod: JZ | Performed by: INTERNAL MEDICINE

## 2023-11-15 PROCEDURE — 250N000011 HC RX IP 250 OP 636: Mod: JZ | Performed by: STUDENT IN AN ORGANIZED HEALTH CARE EDUCATION/TRAINING PROGRAM

## 2023-11-15 PROCEDURE — 250N000013 HC RX MED GY IP 250 OP 250 PS 637: Performed by: HOSPITALIST

## 2023-11-15 PROCEDURE — 250N000011 HC RX IP 250 OP 636: Mod: JZ | Performed by: NEUROLOGICAL SURGERY

## 2023-11-15 PROCEDURE — 634N000001 HC RX 634: Mod: JZ | Performed by: INTERNAL MEDICINE

## 2023-11-15 PROCEDURE — B4185 PARENTERAL SOL 10 GM LIPIDS: HCPCS | Mod: JZ | Performed by: HOSPITALIST

## 2023-11-15 PROCEDURE — 36415 COLL VENOUS BLD VENIPUNCTURE: CPT | Performed by: INTERNAL MEDICINE

## 2023-11-15 PROCEDURE — 92526 ORAL FUNCTION THERAPY: CPT | Mod: GN | Performed by: SPEECH-LANGUAGE PATHOLOGIST

## 2023-11-15 PROCEDURE — 250N000013 HC RX MED GY IP 250 OP 250 PS 637: Performed by: NURSE PRACTITIONER

## 2023-11-15 PROCEDURE — 80069 RENAL FUNCTION PANEL: CPT | Performed by: INTERNAL MEDICINE

## 2023-11-15 PROCEDURE — 250N000013 HC RX MED GY IP 250 OP 250 PS 637: Performed by: INTERNAL MEDICINE

## 2023-11-15 PROCEDURE — 120N000001 HC R&B MED SURG/OB

## 2023-11-15 PROCEDURE — 82374 ASSAY BLOOD CARBON DIOXIDE: CPT | Performed by: INTERNAL MEDICINE

## 2023-11-15 PROCEDURE — 250N000009 HC RX 250: Performed by: NEUROLOGICAL SURGERY

## 2023-11-15 PROCEDURE — 85027 COMPLETE CBC AUTOMATED: CPT | Performed by: INTERNAL MEDICINE

## 2023-11-15 PROCEDURE — 250N000009 HC RX 250: Mod: JZ | Performed by: HOSPITALIST

## 2023-11-15 PROCEDURE — 97530 THERAPEUTIC ACTIVITIES: CPT | Mod: GP

## 2023-11-15 PROCEDURE — 258N000003 HC RX IP 258 OP 636: Performed by: INTERNAL MEDICINE

## 2023-11-15 PROCEDURE — 99232 SBSQ HOSP IP/OBS MODERATE 35: CPT | Performed by: INTERNAL MEDICINE

## 2023-11-15 PROCEDURE — C9113 INJ PANTOPRAZOLE SODIUM, VIA: HCPCS | Mod: JZ | Performed by: NEUROLOGICAL SURGERY

## 2023-11-15 PROCEDURE — 99207 PR CDG-CUT & PASTE-POTENTIAL IMPACT ON LEVEL: CPT | Performed by: STUDENT IN AN ORGANIZED HEALTH CARE EDUCATION/TRAINING PROGRAM

## 2023-11-15 PROCEDURE — G0463 HOSPITAL OUTPT CLINIC VISIT: HCPCS

## 2023-11-15 PROCEDURE — 99233 SBSQ HOSP IP/OBS HIGH 50: CPT | Performed by: STUDENT IN AN ORGANIZED HEALTH CARE EDUCATION/TRAINING PROGRAM

## 2023-11-15 PROCEDURE — 90937 HEMODIALYSIS REPEATED EVAL: CPT

## 2023-11-15 PROCEDURE — P9045 ALBUMIN (HUMAN), 5%, 250 ML: HCPCS | Mod: JZ | Performed by: INTERNAL MEDICINE

## 2023-11-15 PROCEDURE — 250N000013 HC RX MED GY IP 250 OP 250 PS 637: Performed by: STUDENT IN AN ORGANIZED HEALTH CARE EDUCATION/TRAINING PROGRAM

## 2023-11-15 PROCEDURE — 92611 MOTION FLUOROSCOPY/SWALLOW: CPT | Mod: GN | Performed by: SPEECH-LANGUAGE PATHOLOGIST

## 2023-11-15 PROCEDURE — 97530 THERAPEUTIC ACTIVITIES: CPT | Mod: GO

## 2023-11-15 PROCEDURE — 74230 X-RAY XM SWLNG FUNCJ C+: CPT

## 2023-11-15 PROCEDURE — 250N000011 HC RX IP 250 OP 636: Mod: JZ | Performed by: NURSE PRACTITIONER

## 2023-11-15 PROCEDURE — 97535 SELF CARE MNGMENT TRAINING: CPT | Mod: GO

## 2023-11-15 RX ORDER — ALBUMIN (HUMAN) 12.5 G/50ML
50 SOLUTION INTRAVENOUS
Status: DISCONTINUED | OUTPATIENT
Start: 2023-11-15 | End: 2023-11-15

## 2023-11-15 RX ORDER — CALCIUM ACETATE 667 MG/1
667 CAPSULE ORAL
Status: DISCONTINUED | OUTPATIENT
Start: 2023-11-15 | End: 2023-11-15

## 2023-11-15 RX ORDER — ALBUMIN (HUMAN) 12.5 G/50ML
50 SOLUTION INTRAVENOUS
Status: CANCELLED | OUTPATIENT
Start: 2023-11-15

## 2023-11-15 RX ORDER — BARIUM SULFATE 400 MG/ML
SUSPENSION ORAL ONCE
Status: COMPLETED | OUTPATIENT
Start: 2023-11-15 | End: 2023-11-15

## 2023-11-15 RX ORDER — HEPARIN SODIUM 1000 [USP'U]/ML
500 INJECTION, SOLUTION INTRAVENOUS; SUBCUTANEOUS CONTINUOUS
Status: CANCELLED | OUTPATIENT
Start: 2023-11-15

## 2023-11-15 RX ADMIN — ALBUMIN HUMAN 250 ML: 0.05 INJECTION, SOLUTION INTRAVENOUS at 10:22

## 2023-11-15 RX ADMIN — ACETAMINOPHEN 650 MG: 325 TABLET, FILM COATED ORAL at 13:56

## 2023-11-15 RX ADMIN — Medication 400 MG: at 15:55

## 2023-11-15 RX ADMIN — BARIUM SULFATE 40 ML: 400 SUSPENSION ORAL at 09:17

## 2023-11-15 RX ADMIN — HYDRALAZINE HYDROCHLORIDE 10 MG: 10 TABLET ORAL at 15:54

## 2023-11-15 RX ADMIN — OLIVE OIL AND SOYBEAN OIL 250 ML: 16; 4 INJECTION, EMULSION INTRAVENOUS at 20:53

## 2023-11-15 RX ADMIN — Medication 5 ML: at 16:52

## 2023-11-15 RX ADMIN — BARIUM SULFATE 20 ML: 400 SUSPENSION ORAL at 09:16

## 2023-11-15 RX ADMIN — SODIUM CHLORIDE 250 ML: 9 INJECTION, SOLUTION INTRAVENOUS at 10:20

## 2023-11-15 RX ADMIN — HEPARIN SODIUM 5000 UNITS: 5000 INJECTION, SOLUTION INTRAVENOUS; SUBCUTANEOUS at 08:24

## 2023-11-15 RX ADMIN — PANTOPRAZOLE SODIUM 40 MG: 40 INJECTION, POWDER, FOR SOLUTION INTRAVENOUS at 06:52

## 2023-11-15 RX ADMIN — ASCORBIC ACID, VITAMIN A PALMITATE, CHOLECALCIFEROL, THIAMINE HYDROCHLORIDE, RIBOFLAVIN-5 PHOSPHATE SODIUM, PYRIDOXINE HYDROCHLORIDE, NIACINAMIDE, DEXPANTHENOL, ALPHA-TOCOPHEROL ACETATE, VITAMIN K1, FOLIC ACID, BIOTIN, CYANOCOBALAMIN: 200; 3300; 200; 6; 3.6; 6; 40; 15; 10; 150; 600; 60; 5 INJECTION, SOLUTION INTRAVENOUS at 20:54

## 2023-11-15 RX ADMIN — HEPARIN SODIUM 5000 UNITS: 5000 INJECTION, SOLUTION INTRAVENOUS; SUBCUTANEOUS at 16:52

## 2023-11-15 RX ADMIN — LEVOFLOXACIN 500 MG: 5 INJECTION, SOLUTION INTRAVENOUS at 15:55

## 2023-11-15 RX ADMIN — HYDRALAZINE HYDROCHLORIDE 10 MG: 10 TABLET ORAL at 06:52

## 2023-11-15 RX ADMIN — TRAZODONE HYDROCHLORIDE 25 MG: 50 TABLET ORAL at 21:05

## 2023-11-15 RX ADMIN — ALTEPLASE 2 MG: 2.2 INJECTION, POWDER, LYOPHILIZED, FOR SOLUTION INTRAVENOUS at 12:29

## 2023-11-15 RX ADMIN — HYDRALAZINE HYDROCHLORIDE 10 MG: 10 TABLET ORAL at 21:05

## 2023-11-15 RX ADMIN — ACETAMINOPHEN 650 MG: 325 TABLET, FILM COATED ORAL at 18:12

## 2023-11-15 RX ADMIN — Medication 2 SPRAY: at 08:35

## 2023-11-15 RX ADMIN — EPOETIN ALFA-EPBX 10000 UNITS: 10000 INJECTION, SOLUTION INTRAVENOUS; SUBCUTANEOUS at 11:14

## 2023-11-15 RX ADMIN — NYSTATIN: 100000 CREAM TOPICAL at 08:37

## 2023-11-15 RX ADMIN — HYDROXYZINE HYDROCHLORIDE 10 MG: 10 TABLET ORAL at 17:29

## 2023-11-15 RX ADMIN — DILTIAZEM HYDROCHLORIDE 180 MG: 180 CAPSULE, COATED, EXTENDED RELEASE ORAL at 15:55

## 2023-11-15 RX ADMIN — OXYCODONE HYDROCHLORIDE 5 MG: 5 TABLET ORAL at 08:08

## 2023-11-15 RX ADMIN — ALTEPLASE 2 MG: 2.2 INJECTION, POWDER, LYOPHILIZED, FOR SOLUTION INTRAVENOUS at 12:32

## 2023-11-15 RX ADMIN — HEPARIN SODIUM 5000 UNITS: 5000 INJECTION, SOLUTION INTRAVENOUS; SUBCUTANEOUS at 00:41

## 2023-11-15 RX ADMIN — ONDANSETRON 4 MG: 2 INJECTION INTRAMUSCULAR; INTRAVENOUS at 07:50

## 2023-11-15 RX ADMIN — OXYCODONE HYDROCHLORIDE 5 MG: 5 TABLET ORAL at 15:54

## 2023-11-15 RX ADMIN — LEVOTHYROXINE SODIUM 100 MCG: 100 TABLET ORAL at 06:52

## 2023-11-15 RX ADMIN — SODIUM CHLORIDE 300 ML: 9 INJECTION, SOLUTION INTRAVENOUS at 10:20

## 2023-11-15 RX ADMIN — SENNOSIDES AND DOCUSATE SODIUM 1 TABLET: 8.6; 5 TABLET ORAL at 21:05

## 2023-11-15 RX ADMIN — NYSTATIN: 100000 CREAM TOPICAL at 20:53

## 2023-11-15 ASSESSMENT — ACTIVITIES OF DAILY LIVING (ADL)
ADLS_ACUITY_SCORE: 42
ADLS_ACUITY_SCORE: 44
ADLS_ACUITY_SCORE: 42
ADLS_ACUITY_SCORE: 44
ADLS_ACUITY_SCORE: 42
ADLS_ACUITY_SCORE: 44
ADLS_ACUITY_SCORE: 44

## 2023-11-15 NOTE — PROGRESS NOTES
[facility]     Renal Progress Note       SHORTHAND KEY FOR MY NOTES:  c = with, s = without, p = after, a = before, x = except, asx = asymptomatic, tx = transplant or treatment, sx = symptoms or symptomatic, cx = canceled or culture, rxn = reaction, yday = yesterday, nl = normal, abx = antibiotics, fxn = function, dx = diagnosis, dz = disease, m/h = melena/hematochezia, c/d/l/ha = cramping/dizziness/lightheadedness/headache, d/c = discharge or diarrhea/constipation, f/c/n/v = fevers/chills/nausea/vomiting, cp/sob = chest pain/shortness of breath, tbv = total body volume, rxn = reaction, tdc = tunneled dialysis catheter, pta = prior to admission, hd = hemodialysis, pd = peritoneal dialysis, hhd = home hemodialysis, edw = estimated dry wt         Assessment/Plan:     1.  Oliguric LISA/CKD IIIa.  Pt remains HD-dependent.  She isn't making much urine.  Today she is having some issues c clotting the dialysers and she is not on any heparin during the runs.  Hb and plts have been stable.  Remains TBV up from hypoalbuminemic third-spacing.  A.  Next planned HD on Fri.  B.  Follow labs, uo, sx.  C.  Avoid nephrotoxics.    2.  Enterobacter pneumonia.  Pt is breathing ok.  She is afebrile and is on abx.  A.  Continue levoflox at proper ESKD dosing.    3.  Anemia. Pt's hb is stable.  We are going to use low dose heparin while on HD so will need to monitor for bleeding.  A.  EPO 10k qHD.  B.  Follow hb, clinically.    4.  FEN.  Na is low but stable.  Phos is trending up a bit but is better p HD.  She is eating some food and is on TPN.  Other electrolytes are all fine.  A.  Continue same diet.  B.  If the phos rises a lot more, then we will ensure there isn't much in the TPN and consider binders.  C.  Follow electrolytes.        Interval History:     Pt has clotted her dialyser 3x today.  She's currently getting tPA in the ports.  We will also try some low dose heparin.  She is tired o/w ok.           "Medications and Allergies:      - MEDICATION INSTRUCTIONS for Dialysis Patients -   Does not apply See Admin Instructions    alteplase  2 mg Intracatheter Once in dialysis/CRRT    alteplase  2 mg Intracatheter Once in dialysis/CRRT    artificial saliva  2 spray Swish & Spit 4x Daily    B and C vitamin Complex with folic acid  5 mL Oral or Feeding Tube Daily    diltiazem ER COATED BEADS  180 mg Oral Daily    heparin ANTICOAGULANT  5,000 Units Subcutaneous Q8H    hydrALAZINE  10 mg Oral or Feeding Tube Q8H    insulin aspart  1-6 Units Subcutaneous Q4H    levofloxacin  500 mg Intravenous Q48H    levothyroxine  100 mcg Oral or NG Tube QAM AC    lipids plant base  250 mL Intravenous Q24H    magnesium oxide  400 mg Oral Daily    - MEDICATION INSTRUCTIONS -   Does not apply Once    nystatin   Topical BID    pantoprazole  40 mg Oral or Feeding Tube QAM AC    Or    pantoprazole  40 mg Intravenous QAM AC    polyethylene glycol  17 g Oral or NG Tube Daily    senna-docusate  1 tablet Oral or NG Tube BID    sodium chloride (PF)  3 mL Intracatheter Q8H    sodium chloride (PF)  9 mL Intracatheter During Dialysis/CRRT (from stock)    sodium chloride (PF)  9 mL Intracatheter During Dialysis/CRRT (from stock)    traZODone  25 mg Oral At Bedtime     Allergies   Allergen Reactions    Fentanyl Nausea and Vomiting     VERY sensitive to most narcotics if not all.    Morphine And Related Nausea          Physical Exam:     Vitals were reviewed     , Blood pressure 116/59, pulse 82, temperature 97.1  F (36.2  C), temperature source Oral, resp. rate 18, height 1.651 m (5' 5\"), weight 99 kg (218 lb 4.1 oz), SpO2 95%.  Wt Readings from Last 3 Encounters:   11/15/23 99 kg (218 lb 4.1 oz)   10/19/23 95 kg (209 lb 8 oz)   09/28/23 97.1 kg (214 lb)     Intake/Output Summary (Last 24 hours) at 11/15/2023 1405  Last data filed at 11/15/2023 1215  Gross per 24 hour   Intake 100 ml   Output --   Net 100 ml     GENERAL APPEARANCE: pleasant, NAD, " alert  RESP: CTA B c good efforts  CV: RRR, nl S1/S2   ABDOMEN: morbid obesity  EXTREMITIES/SKIN: no significant ble edema  ACCESS:  RIJ TDC    Pt seen on HD.  Will add some low dose heparin given she's clotting multiple dialysers.  -2L UF goal.  Good BFR via RIJ TDC.         Data:     CBC RESULTS:     Recent Labs   Lab 11/15/23  0801 11/14/23  0614 11/13/23  0534 11/11/23  0939 11/08/23  2342   WBC 7.1  --  8.6  --   --    RBC 3.16*  --  2.85*  --   --    HGB 9.1*  --  8.2*  --   --    HCT 29.7*  --  25.6*  --   --     129* 210 196 181     Basic Metabolic Panel:  Recent Labs   Lab 11/15/23  1301 11/15/23  0801 11/15/23  0605 11/15/23  0204 11/14/23  2039 11/14/23  1644 11/14/23  1615 11/13/23  0745 11/13/23  0534 11/12/23  0825 11/12/23  0718 11/11/23  1155 11/11/23  0939 11/10/23  1236 11/10/23  0705   NA  --  133*  --   --   --   --  133*  --  130*  --  130*  --  135  --  138   POTASSIUM  --  4.8  --   --   --   --  4.5  --  4.4  --  4.1  --  3.9  --  3.9   CHLORIDE  --  96*  --   --   --   --  96*  --  94*  --  93*  --  98  --  97*   CO2  --  25  --   --   --   --  27  --  24  --  24  --  27  --  26   BUN  --  47.9*  --   --   --   --  40.0*  --  70.8*  --  60.8*  --  32.5*  --  36.1*   CR  --  4.62*  --   --   --   --  4.01*  --  5.94*  --  4.97*  --  3.78*  --  5.36*   * 120* 122* 120* 112* 105* 114*   < > 110*   < > 105*   < > 138*   < > 104*   TERESA  --  8.8  --   --   --   --  8.2*  --  8.4*  --  8.4*  --  8.2*  --  8.6*    < > = values in this interval not displayed.     INRNo lab results found in last 7 days.   Attestation:   I have reviewed today's relevant vital signs, notes, medications, labs and imaging.    Wade Fish MD  [facility] - Nephrology  [phone]

## 2023-11-15 NOTE — PLAN OF CARE
Goal Outcome Evaluation:      Pt A&Ox3-4, disoriented to time, forgetful at times. Turn and repo with assist of 2 using lift device. TPN and Lipids at scheduled doses through L midline.  R CVC in place. Back incision open to air, steri strips intact, denies nausea/vomiting, denies SOB, denies chest pain. R leg abrasion open to air, mepilex in place on bottom, R arm skin tear, Atarax and Tylenol given for pain. Will continue to monitor.

## 2023-11-15 NOTE — PROGRESS NOTES
"[facility] Nurse Inpatient Assessment     Consulted for: Wound R groin wound     Summary: patient with POA masd with moisture split to right groin, noted healed intact 11/15    Patient History (according to provider note(s):      \"83-year-old woman with PMH DM2, hypothyroidism, HLD, HTN admitted 10/25/2023 due to hemorrhagic shock after lumbar spinal fusion.  Course complicated by LISA and encephalopathy. \"    Assessment:      Areas visualized during today's visit: Focused: and Perineal area    Wound location: right groin right abd fold     Last photo: 11/15  Wound due to: Moisture Associated Skin Damage (MASD) moisture split   Wound history/plan of care: found with powder and diaper in use   Wound base:  epidermis     Palpation of the wound bed: normal      Drainage: none     Description of drainage: none     Measurements (length x width x depth, in cm): none       Tunneling: N/A     Undermining: N/A  Periwound skin: Intact      Color: normal and consistent with surrounding tissue      Temperature: normal   Odor: none  Pain: denies , none  Pain interventions prior to dressing change: patient tolerated well  Treatment goal: Decrease moisture and Protection  STATUS: healed, improving, and resurfaced   Supplies ordered: supplies stored on unit and discussed with patient        Treatment Plan:       Wound care  Start:  11/08/23 1242,   EVERY SHIFT,   Routine        Comments: Location: right abd fold, right groin  Care: provided BID by primary RN  1. Cleanse BID with routine hygiene (showering, bathing, mild plain soap and water), then pat dry  2. Apply Interdry textile to skin fold, cut piece Large enough so that at least 2 inches of fabric hangs outside the skin fold when the abd is relaxed  3. Ok to use each piece of interdry for up to 5 days, see below for more details. See instructions on box as well.    Daily:  Interdry(order#[id number]):  1.  Wash skin gently. Pat, do not rub.  2.  With " "clean scissors, cut enough fabric to cover the affected area, allowing for a minimum of 2 inches to extend beyond the skin fold for moisture evaporation.  3.  Lay a single layer of fabric in the skin fold, placing one edge into the base of the fold. Gently smooth the rest of the fabric over the skin, keeping it flat.  4.  Leave at least 2 inches of fabric exposed outside the skin fold.  5.  Secure the fabric in one of several ways: with the skin fold, with a small amount of skin-friendly tape, or tucked under clothing.  6.  Remove the fabric before bathing and reuse it when finished. When removing, gently separate the skin fold and lift away.  Helpful Hints  1. InterDry  can be written on with a ballpoint pen. It may be helpful to label each piece of InterDry  with the date you started using it.  2.  Each piece of InterDry  may be used up to 5 days, depending on fabric soiling, odor, amount of moisture and general skin condition. Replace InterDry  if it becomes soiled with blood, urine or stool.  3.  Do not use creams, ointments, or powders with InterDry  as it may interfere with product efficacy.          Skin care precautions  Start:  11/08/23 1242,   EFFECTIVE NOW,   Routine        Comments: Pressure Injury Prevention (PIP) Plan:  If patient is declining pressure injury prevention interventions: Explore reason why and address patient's concerns, Educate on pressure injury risk and prevention intervention(s), If patient is still declining, document \"informed refusal\" , and Ensure Care team is aware ( provider, charge nurse, etc)  Mattress: Follow bed algorithm, reassess daily and order specialty mattress, if indicated.  HOB: Maintain at or below 30 degrees, unless contraindicated  Repositioning in bed: Every 1-2 hours , Left/right positioning; avoid supine, and Raise foot of bed prior to raising head of bed, to reduce patient sliding down (shear)  Heels: Keep elevated off mattress and Pillows under " calves  Protective Dressing: Sacral Mepilex for prevention (#602050),  especially for the agitated patient  Chair positioning: Chair cushion (#176222) , Assist patient to reposition hourly, and Do NOT use a donut for sitting (this increases pressure to smaller area and creates a higher potential for injury)    If patient has a buttock pressure injury, or high risk for PI use chair cushion or SPS.  Moisture Management: Perineal cleansing /protection: Follow Incontinence Protocol, Avoid brief in bed, Clean and dry skin folds with bathing , Consider InterDry (#965657) between folds, and Moisturize dry skin  Under Devices: Inspect skin under all medical devices during skin inspection , Ensure tubes are stabilized without tension, and Ensure patient is not lying on medical devices or equipment when repositioned  Ask provider to discontinue device when no longer needed.        Orders: Reviewed    RECOMMEND PRIMARY TEAM ORDER: None, at this time  Education provided: plan of care, Moisture management, and Hygiene  Discussed plan of care with: Patient and Nurse  WOC nurse follow-up plan: weekly  Notify WOC if wound(s) deteriorate.  Nursing to notify the Provider(s) and re-consult the WOC Nurse if new skin concern.    DATA:     Current support surface: Standard  Standard gel/foam mattress (IsoFlex, Atmos air, etc)  Containment of urine/stool: Incontinence Protocol and Incontinent pad in bed  BMI: Body mass index is 36.32 kg/m .   Active diet order: Orders Placed This Encounter      Combination Diet Minced and Moist Diet (level 5); Slightly Thick (level 1) (By teaspoon)     Output: I/O last 3 completed shifts:  In: 100 [P.O.:100]  Out: 3000 [Other:3000]     Labs:   Recent Labs   Lab 11/15/23  0801   ALBUMIN 3.1*   HGB 9.1*   WBC 7.1     Pressure injury risk assessment:   Sensory Perception: 3-->slightly limited  Moisture: 3-->occasionally moist  Activity: 2-->chairfast  Mobility: 3-->slightly limited  Nutrition: 2-->probably  inadequate  Friction and Shear: 2-->potential problem  Rick Score: 15    Nikole CWOCN   1st choice: Securely message with [employer] ([facility] [employer])   (2nd option: [facility] Office Phone [phone], messages checked periodically Mon-Fri 8a-4p)

## 2023-11-15 NOTE — PROGRESS NOTES
[facility]  Neurosurgery Daily Progress Note    Assessment & Plan   Procedure(s):  Thoracic 11 to pelvis robotic decompression and fusion   -21 Days Post-Op    Patient was at dialysis during rounds. Chart reviewed. SW coordinating placement.   Recommend ARU at discharge. Discussed with SW team and Dr. Waite.     Tete Mccullough, CNP  [facility] Neurosurgery  [address] [address]  Tel [phone]  Pager [phone]

## 2023-11-15 NOTE — PROGRESS NOTES
[facility]    Hospitalist Progress Note    Interval History   Patient worked with PT early this morning and she was seating and very tired when I saw her. She was going down for her video swallow and then to dialysis. She denied pain but was winded. She had a good night otherwise. Updated later in the day by SLP that her swallow function had progressed and diet advanced.     Assessment & Plan   Summary: Mya Hui is a 83 year old female with PMH Scoliosis, Lumbar radiculopathy, HTN, HLP, DM2, Hypothyroidism, CKD stage 3, Morbid obesity, GERD, Sliding hiatal hernia, Overactive bladder, OA, Chronic right shoulder pain/rotator cuff arthropathy, Migraines, Asymptomatic carotid artery stenosis who was admitted on 10/25/2023 with an elective T11 to pelvis robotic decompression and fusion.  Surgery was performed on 10/25/23 under general anesthesia.  Surgery was complicated with development of arrhythmia with significant hypotension and noted 3.5 L blood loss per Op Note and Anesthesia postprocedure evaluation note.  Patient received IV fluids, 6 units of pRBCs and 2 units of FFP intra-op.  ST changes were noted intraoperatively.  Patient remained intubated and started on Levophed and was transferred to the ICU due to hemorrhagic shock.  She required vasopressor support with Levophed until 10/27.   Patient remained intubated until the morning of 11/2 and has been on supplemental O2 per Oxymask currently at 4 L/m.   Patient received additional 2 units FFP, 10 units of cryo and 1 unit of PLT. Transferred to hospitalist on 11/3.    Post-op hemorrhagic shock, resolved  Shocked liver-resolved  Type 2 MI due to hemorrhagic shock, improved  Surgery was complicated with development of arrhythmia with significant hypotension and noted 3.5 L blood loss per Op Note and Anesthesia postprocedure evaluation note.  Patient received IV fluids, 6 units of pRBCs and 2 units of FFP intra-op. Patient received  No specimen per physician additional 2 units FFP, 10 units of cryo and 1 unit of PLT while in the ICU. LFTs continue to improve 11/5-11/7.    Encephalopathy, multifactorial, improving  Incidental finding of left basal ganglia restricted diffusion  Patient was noted to be encephalopathic for which Vascular Neurology was consulted.  Brain MRI was completed and noted a left basal ganglia small focus of restricted diffusion but not a cause for encephalopathy and felt it was likely multifactorial.  EEG monitor was completed and negative and subsequently stopped.  Suspect due to uremic encephalopathy and sepsis.   Encephalopathy improved 11/6  Delirium prevention:   - Reorient patient at each interaction.  - Keep room lights on and blinds open during day (8am-8pm), low lights 8pm-8am.  - Minimize interruptions of sleep at night (consolidate vitals, nursing assessments, medications).  - Avoid sedating medications as able.    - Vascular Neurology recommended repeat brain MRI in 2-3 months.      LISA/ATN requiring hemodialysis  Anion gap metabolic acidosis due to above  CKD stage 3a  Hypoalbuminemia  Anasarca  Baseline creatinine between 1.1-1.4 with GFR in the 40-50's.    Patient had been developed fluid overload and was treated with lasix drip with good output but developed anephric rising creatinine, BUN and developed worsening acidosis.  Due to development of LISA-D Nephrology was consulted and tunnel dialysis catheter placed 10/30 and has been undergoing HD Mon, Wed, Fri (last session was today, 11/3/2023).     Troponin were trended and minimally elevated.  ECHO completed and without any abnormalities (LV normal size and function with EF of 65-70%.  RV normal size, function and structure.  No valve disease).  - Continue dialysis, now on MWF schedule. Will need outpatient dialysis as we await recovery of renal function    Aspiration/ventilator associated PNA (Enterobacter, stenotrophomonas and E. Coli)  Acute hypoxic respiratory failure  resolved  Leukocytosis due to above  Extubated 11/2. Patient has also developed aspiration/ventilator associated pneumonia with sputum Cx growing Enterobacter, stenotrophomonas and E. coli and was initially started on Zoysn and switched to Meropenem on 11/1/2023.  Currently continued on Meropenem and awaiting final cultures. Procal 3. Sputum cultures growing Enterobacter, E coli, Stenotrophomonas.     - Continue Levofloxacin, likely 14 day course ending 11/14  - Encourage use of IS/Flutter valve.      Dysphagia  Moderate malnutrition  Failed SLP assessment. Bedside feeding tube placement coiling due to hiatal hernia. Note improvement in dysphagia on 11/5, upgraded to pureed diet. Discussed with SLP, hold off tube feed placement while patient's dysphagia is improving, consider if improvement stalls or oral intake remains below target.    - improving swallow function. Will plan to stop PPN tomorrow. Can consider NG for temporary nutrition but currently no indication to pursue PEG with expected improvement in her swallow function  - Transition meds to oral    Scoliosis  Lumbar radiculopathy  S/p T11 to pelvis robotic decompression and fusion  - Neurosurgery is managing.    HTN  - transition to long acting dilt today. Continue hydralazine  - holding PTA lisinopril for ATN    HLP: Hold PTA pravastatin 40 mg/d.      Type 2 DM, controlled  Stress induced hyperglycemia  - no insulin needs recently, possible due to antibiotic use    Hypothyroidism: PTA synthroid pending feeding tube placement    GERD  Sliding hiatal hernia  - PTA PPI    OA  Chronic right shoulder pain/rotator cuff arthropathy  Chronic Pain  - Pain/analgesic management per Neurosurgery.      Obesity   Suspected ANAYELI  Body mass index is 37.64 kg/m .  Increase in all-cause morbidity and mortality.   - Follow up with PCP regarding ongoing management.    - Monitor O2 saturations.    - Recommend outpatient sleep study.      Overactive bladder: Hold PTA Ditropan XL  "10 mg/d.      Migraines: Pain/analgesic management per Neurosurgery.      Asymptomatic carotid artery stenosis.    NSVT  Hypomagnesemia   Plan  - monitor on telemetry with close electrolyte management  - mag oxide  - continue telemetry    Clinically Significant Risk Factors              # Hypoalbuminemia: Lowest albumin = 2.5 g/dL at 11/1/2023  4:19 AM, will monitor as appropriate   # Thrombocytopenia: Lowest platelets = 129 in last 2 days, will monitor for bleeding   # Hypertension: Noted on problem list        # Obesity: Estimated body mass index is 36.32 kg/m  as calculated from the following:    Height as of this encounter: 1.651 m (5' 5\").    Weight as of this encounter: 99 kg (218 lb 4.1 oz).   # Moderate Malnutrition: based on nutrition assessment      # Financial/Environmental Concerns: none            Diet: Snacks/Supplements Adult: Gelatein Plus; With Meals  parenteral nutrition - Clinimix E  Combination Diet Minced and Moist Diet (level 5); Slightly Thick (level 1) (By teaspoon)  parenteral nutrition - Clinimix E    DVT Prophylaxis: Heparin SQ  Spicer Catheter: Not present  Lines: PRESENT      CVC Double Lumen Right Internal jugular Non - valved (open ended);Tunneled-Site Assessment: WDL      Cardiac Monitoring: None  Code Status: Full Code    Disposition: Anticipated discharge pending improvement in oral intake, possibly to LTMATEUS Lester DO  Hospitalist Service  [facility]  Securely message with [employer] (more info)  Text page via [employer] Paging/Directory     Data reviewed today: I reviewed all new labs and imaging results over the last 24 hours. I spoke with nursing and SLP    Medical Decision Making       45 MINUTES SPENT BY ME on the date of service doing chart review, history, exam, documentation & further activities per the note.       Physical Exam   Temp: 97.1  F (36.2  C) Temp src: Oral BP: 125/81 Pulse: 84   Resp: 18 SpO2: 94 % O2 Device: Nasal cannula Oxygen " Delivery: 1.5 LPM  Vitals:    11/13/23 0206 11/14/23 0903 11/15/23 0621   Weight: 100.9 kg (222 lb 7.1 oz) 100 kg (220 lb 7.4 oz) 99 kg (218 lb 4.1 oz)     Vital Signs with Ranges  Temp:  [97.1  F (36.2  C)-98.4  F (36.9  C)] 97.1  F (36.2  C)  Pulse:  [72-85] 84  Resp:  [16-18] 18  BP: (120-152)/(59-81) 125/81  SpO2:  [89 %-98 %] 94 %  I/O last 3 completed shifts:  In: 340 [P.O.:340]  Out: -   O2 requirements: no    Constitutional: Elderly female appears ill, generalized anasarca noted  HEENT: Eyes nonicteric, oral mucosa moist  Cardiovascular: RRR  Respiratory: crackles and rhales noted in bases, no coughing  Vascular: Trace BLE pitting edema  GI: Normoactive bowel sounds, appears nontender  Skin/Integumen: No rashes    Medications    dextrose      parenteral nutrition - Clinimix E      parenteral nutrition - Clinimix E 40 mL/hr at 11/15/23 0830        - MEDICATION INSTRUCTIONS for Dialysis Patients -   Does not apply See Admin Instructions    alteplase  2 mg Intracatheter Once in dialysis/CRRT    alteplase  2 mg Intracatheter Once in dialysis/CRRT    artificial saliva  2 spray Swish & Spit 4x Daily    B and C vitamin Complex with folic acid  5 mL Oral or Feeding Tube Daily    diltiazem ER COATED BEADS  180 mg Oral Daily    heparin ANTICOAGULANT  5,000 Units Subcutaneous Q8H    hydrALAZINE  10 mg Oral or Feeding Tube Q8H    insulin aspart  1-6 Units Subcutaneous Q4H    levofloxacin  500 mg Intravenous Q48H    levothyroxine  100 mcg Oral or NG Tube QAM AC    lipids plant base  250 mL Intravenous Q24H    magnesium oxide  400 mg Oral Daily    - MEDICATION INSTRUCTIONS -   Does not apply Once    nystatin   Topical BID    pantoprazole  40 mg Oral or Feeding Tube QAM AC    Or    pantoprazole  40 mg Intravenous QAM AC    polyethylene glycol  17 g Oral or NG Tube Daily    senna-docusate  1 tablet Oral or NG Tube BID    sodium chloride (PF)  3 mL Intracatheter Q8H    sodium chloride (PF)  9 mL Intracatheter During  Dialysis/CRRT (from stock)    sodium chloride (PF)  9 mL Intracatheter During Dialysis/CRRT (from stock)    traZODone  25 mg Oral At Bedtime       Data   Recent Labs   Lab 11/15/23  0801 11/15/23  0605 11/15/23  0204 11/14/23  1644 11/14/23  1615 11/14/23  0815 11/14/23  0614 11/13/23  0745 11/13/23  0534 11/12/23  0825 11/12/23  0718   WBC 7.1  --   --   --   --   --   --   --  8.6  --   --    HGB 9.1*  --   --   --   --   --   --   --  8.2*  --   --    MCV 94  --   --   --   --   --   --   --  90  --   --      --   --   --   --   --  129*  --  210  --   --    *  --   --   --  133*  --   --   --  130*  --  130*   POTASSIUM 4.8  --   --   --  4.5  --   --   --  4.4  --  4.1   CHLORIDE 96*  --   --   --  96*  --   --   --  94*  --  93*   CO2 25  --   --   --  27  --   --   --  24  --  24   BUN 47.9*  --   --   --  40.0*  --   --   --  70.8*  --  60.8*   CR 4.62*  --   --   --  4.01*  --   --   --  5.94*  --  4.97*   ANIONGAP 12  --   --   --  10  --   --   --  12  --  13   TERESA 8.8  --   --   --  8.2*  --   --   --  8.4*  --  8.4*   * 122* 120*   < > 114*   < >  --    < > 110*   < > 105*   ALBUMIN 3.1*  --   --   --  3.0*  --   --   --  2.7*  --  2.9*   PROTTOTAL  --   --   --   --   --   --   --   --  5.6*  --  5.8*   BILITOTAL  --   --   --   --   --   --   --   --  0.3  --  0.3   ALKPHOS  --   --   --   --   --   --   --   --  97  --  92   ALT  --   --   --   --   --   --   --   --  15  --  16   AST  --   --   --   --   --   --   --   --  27  --  24    < > = values in this interval not displayed.       Imaging:   Recent Results (from the past 24 hour(s))   XR Video Swallow with SLP or OT    Narrative    VIDEO SWALLOWING EVALUATION   11/15/2023 9:17 AM     HISTORY: further assess oropharyngeal swallow function    COMPARISON: Video swallow study: 11/7/2023.    FLUOROSCOPY TIME: 1.8 minutes.  SPOT IMAGES OR CINE RUNS: 16      Impression    IMPRESSION:      Thin: Multiple episodes of laryngeal  penetration, reaching cord level  on occasion. No nito aspiration. Premature spillage. Mild pharyngeal  residue.    Slightly thick: Transient laryngeal penetration without nito  aspiration.    Mildly thick: Transient laryngeal penetration intermittently. No nito  aspiration. Premature spillage. Mild pharyngeal residue.    Moderately thick: No laryngeal penetration or aspiration. Premature  spillage. Mild pharyngeal residue.    Puree: No laryngeal penetration or aspiration. Mild pharyngeal  residue.    Semi-Solid: No laryngeal penetration or aspiration. Mild pharyngeal  residue.    Regular: Not administered.    This study only includes the cervical esophagus.    THIERNO SHIELDS MD         SYSTEM ID:  [id number]

## 2023-11-15 NOTE — PROGRESS NOTES
Care Management Follow Up    Length of Stay (days): 21    Expected Discharge Date: 11/17/2023     Concerns to be Addressed: discharge planning     Patient plan of care discussed at interdisciplinary rounds: Yes    Anticipated Discharge Disposition: Home, Home Care, Dialysis Services, Skilled Nursing Facility, Transitional Care     Anticipated Discharge Services: Transportation Services  Anticipated Discharge DME: Other (see comment) (to be determined)    Patient/family educated on Medicare website which has current facility and service quality ratings:    Education Provided on the Discharge Plan: Yes  Patient/Family in Agreement with the Plan: unable to assess (discussed mutiple discharge choices)    Referrals Placed by CM/SW: External Care Coordination, Specialty Providers  Private pay costs discussed: transportation costs and Not applicable    Additional Information:  Spoke at length with patients daughter April regarding discharge plans. Daughter states [place] [address]  with dialysis at   [facility], [address] [place] [address]  Patient will then transfer to [facility] in [place] that's close to patients home.  Referrals not sent yet     Noted patient will need to be off TPN and source of nutrition to be determined after seen by SP .  Writer sent referrals to [facility] and [facility] as patient is weak and has not tolerated sitting up for a longer period of time. Daughter was in agreement with this plan.  Writer received a call from [facility]. They have declined the patient. Await  reply from [facility].  Spoke with bedside nurse and physical therapist. Patient will need to increase activity while in hospital.    Mariam Peters RN [phone]

## 2023-11-15 NOTE — PROGRESS NOTES
Potassium   Date Value Ref Range Status   11/15/2023 4.8 3.4 - 5.3 mmol/L Final   05/13/2022 4.5 3.4 - 5.3 mmol/L Final   01/06/2021 4.3 3.4 - 5.3 mmol/L Final     Potassium POCT   Date Value Ref Range Status   10/25/2023 4.7 3.4 - 5.3 mmol/L Final     Comment:     000     Hemoglobin   Date Value Ref Range Status   11/15/2023 9.1 (L) 11.7 - 15.7 g/dL Final   09/23/2020 13.1 11.7 - 15.7 g/dL Final     Creatinine   Date Value Ref Range Status   11/15/2023 4.62 (H) 0.51 - 0.95 mg/dL Final   01/06/2021 1.04 0.52 - 1.04 mg/dL Final     Urea Nitrogen   Date Value Ref Range Status   11/15/2023 47.9 (H) 8.0 - 23.0 mg/dL Final   05/13/2022 17 7 - 30 mg/dL Final   01/06/2021 21 7 - 30 mg/dL Final     Sodium   Date Value Ref Range Status   11/15/2023 133 (L) 135 - 145 mmol/L Final     Comment:     Reference intervals for this test were updated on 09/26/2023 to more accurately reflect our healthy population. There may be differences in the flagging of prior results with similar values performed with this method. Interpretation of those prior results can be made in the context of the updated reference intervals.    01/06/2021 144 133 - 144 mmol/L Final     INR   Date Value Ref Range Status   10/27/2023 1.57 (H) 0.85 - 1.15 Final       DIALYSIS PROCEDURE NOTE  Hepatitis status of previous patient on machine log was checked and verified ok to use with this patients hepatitis status.  Patient dialyzed for 33 hrs. on a K3 bath with a net fluid removal of  3L.  A BFR of 150-200 ml/min was obtained via a right cvc.     The treatment plan was discussed with Dr. Fish during the treatment.    Total heparin received during the treatment: 900 units.     Line flushed, clamped and capped with heparin 1:1000 4 mL (2000 units) per lumen    Meds  given: epogen    Complications: patients system clotted x 2. New set ups- activase x1 after 2nd clotted dialyzer but this did not help- Order from Dr Fish to give 0.5 of heparin during treatment and  this did help. Her cvc is still very positional and BFR was in the 150-200 only- MD aware.      Person educated: pre tx. Knowledge base minimal. Barriers to learning: none. Educated on procedure via verbal mode. The patient verbalized understanding. Pt prefers verbal education style.     ICEBOAT? Timeout performed pre-treatment  I: Patient was identified using 2 identifiers  C:  Consent Signed Yes  E: Equipment preventative maintenance is current and dialysis delivery system OK to use  B: Hepatitis B Surface Antigen: negative; Draw Date: 10/23/2023      Hepatitis B Surface Antibody: immune; Draw Date: 10/23/2023  O: Dialysis orders present and complete prior to treatment  A: Vascular access verified and assessed prior to treatment  T: Treatment was performed at a clinically appropriate time  ?: Patient was allowed to ask questions and address concerns prior to treatment  See Adult Hemodialysis flowsheet in [facility] for further details and post assessment.  Machine water alarm in place and functioning. Transducer pods intact and checked every 15min.   Pt returned via bed.  Chlorine/Chloramine water system checked every 4 hours.  Outpatient Dialysis at D    Patient repositioned every 2 hours during the treatment.  Post treatment report given to Jesica Cuadra RN regarding 3L of fluid removed, last BP of  , and patient pain rating of 8/10. Back pain

## 2023-11-15 NOTE — PROGRESS NOTES
VIDEO SWALLOW STUDY       11/15/23 0936   Appointment Info   Signing Clinician's Name / Credentials (SLP) Maida Breaux M.A., CCC-SLP, [facility]   General Information   Onset of Illness/Injury or Date of Surgery 10/25/23   Referring Physician Dr. Lester   Patient/Family Therapy Goal Statement (SLP) Patient would like to eat better, avoid alternative nutrition needs   Pertinent History of Current Problem Patient admitted for lumbar fusion surgery and led to hemorrhagic shock with MI, LISA, encephalopathy.  Her PMH is significant for GERD, DM2, and suspected ANAYELI.   Type of Evaluation   Type of Evaluation Swallow Evaluation   Vocal Quality/Secretion Management (Oral Motor)   Vocal Quality (Oral Motor) hoarse   Secretion Management (Oral Motor) WNL   General Swallowing Observations   Past History of Dysphagia Silent aspiration thin liquids previous study   Current Diet/Method of Nutritional Intake (General Swallowing Observations, NIS) moderately thick (honey-thick) liquids (level 3);pureed (dysphagia pureed) (level 4)   Swallowing Evaluation Videofluoroscopic swallow study (VFSS)   Clinical Swallow Evaluation   Feeding Assistance dependent   VFSS Evaluation   Radiologist Dr. Butler   Views Taken left lateral  (A/P not completed due to decreased pt to tolerate additional trials/poor attention/ability to follow commands.)   Physical Location of Procedure [facility]   VFSS Textures Trialed slightly thick liquids;minced & moist   VFSS Eval: Thin Liquid Texture Trial   Mode of Presentation, Thin Liquid spoon;straw;fed by clinician   Preparatory Phase poor bolus control   Oral Phase, Thin Liquid premature pharyngeal entry   Bolus Location When Swallow Triggered pyriforms   Pharyngeal Phase, Thin Liquid impaired hyolaryngeal excursion   Rosenbek's Penetration Aspiration Scale: Thin Liquid Trial Results 4 - contrast contacts vocal cords, no residue remains (penetration)   Strategies and Compensations reduce bolus size   Diagnostic  Statement Deep trace penetration, shoulder somewhat obstructive to tracheal visualization   VFSS Eval: Slightly Thick Liquids   Mode of Presentation spoon;straw;fed by clinician   Preparatory Phase poor bolus control   Oral Phase premature pharyngeal entry   Bolus Location When Swallow Triggered pyriforms   Pharyngeal Phase impaired hyolaryngel excursion   Rosenbek's Penetration Aspiration Scale 2 - contrast enters airway, remains above the vocal cords, no residue remains (penetration)   Strategies and Compensations reduce bolus size   Diagnostic Statement Deeper flash penetration with straw sips, shallow penetration by spoon   VFSS Eval: Mildly Thick Liquids   Mode of Presentation spoon;fed by clinician   Preparatory Phase poor bolus control   Oral Phase premature pharyngeal entry   Bolus Location When Swallow Triggered pyriforms   Pharyngeal Phase WFL   Rosenbek's Penetration Aspiration Scale 2 - contrast enters airway, remains above the vocal cords, no residue remains (penetration)   Diagnostic Statement Flash penetration   VFSS Eval: Moderately Thick Liquids   Mode of Presentation spoon;fed by clinician   Preparatory Phase poor bolus control   Oral Phase premature pharyngeal entry   Bolus Location When Swallow Triggered valleculae   Pharyngeal Phase residue in vallecula   Rosenbek's Penetration Aspiration Scale 1 - no aspiration, contrast does not enter airway   Diagnostic Statement No penetration, valleculae residue   VFSS Evaluation: Puree Solid Texture Trial   Mode of Presentation, Puree spoon;fed by clinician   Preparatory Phase WFL   Oral Phase, Puree premature pharyngeal entry   Bolus Location When Swallow Triggered valleculae   Pharyngeal Phase, Puree residue in vallecula   Rosenbek's Penetration Aspiration Scale: Puree Food Trial Results 1 - no aspiration, contrast does not enter airway   Diagnostic Statement Minimal valleculae residue   VFSS Eval: Minced & Moist    Mode of Presentation spoon;fed by  clinician   Preparatory Phase prolonged bolus preparation   Oral Phase impaired AP movement;premature pharyngeal entry   Bolus Location When Swallow Triggered valleculae   Pharyngeal Phase residue in vallecula   Rosenbek's Penetration Aspiration Scale 1 - no aspiration, contrast does not enter airway   Diagnostic Statement Minimal valleculae residue   Esophageal Phase of Swallow   Patient reports or presents with symptoms of esophageal dysphagia Yes   Esophageal comments Known hiatal hernia, limited cervical esophageal visualization due to posture/pain limitations   Swallowing Recommendations   Diet Consistency Recommendations slightly thick liquids (level 1);minced & moist (level 5)   Supervision Level for Intake 1:1 supervision needed   Mode of Delivery Recommendations liquids via spoon only;bolus size, small;slow rate of intake   Monitoring/Assistance Required (Eating/Swallowing) monitor for cough or change in vocal quality with intake;stop eating activities when fatigue is present;optimize oral intake to minimize need for tube feeding   Recommended Feeding/Eating Techniques (Swallow Eval) minimize distractions during oral intake;provide assist with feeding;maintain upright posture during/after eating for 30 minutes;maintain upright sitting position for eating   Medication Administration Recommendations, Swallowing (SLP) Whole pills in thick liquid or puree carrier   Clinical Impression   Criteria for Skilled Therapeutic Interventions Met (SLP Eval) Yes, treatment indicated   SLP Diagnosis Mild to moderate dysphagia   Risks & Benefits of therapy have been explained evaluation/treatment results reviewed;care plan/treatment goals reviewed;risks/benefits reviewed;current/potential barriers reviewed;participants voiced agreement with care plan;participants included;patient   Clinical Impression Comments No aspiration, but silent aspiration risks are present when fatigued or being fed by caregiver,thus, careful  feeding strategies and eating conditions are  recommended.   SLP Total Evaluation Time   Evaluation, videofluoroscopic eval of swallow function Minutes (90843) 35   SLP Goals   Therapy Frequency (SLP Eval) 5 times/week   SLP Predicted Duration/Target Date for Goal Attainment 11/22/23   SLP Goals SLP Goal 1   SLP: Safely tolerate diet without signs/symptoms of aspiration Soft & bite sized diet;Slightly thick liquids;With use of compensatory swallow strategies;With assistance/supervision;With use of swallow precautions   SLP: Goal 1 1.  Pt will complete 10-20 reps x 5 oral-pharyngeal exercises given mod cues.   Swallowing Dysfunction &/or Oral Function for Feeding   Treatment of Swallowing Dysfunction &/or Oral Function for Feeding Minutes (01964) 15   Symptoms Noted During/After Treatment None   Treatment Detail/Skilled Intervention Patient able to complete 9 reps of effortful swallow exercise with minimal cueing and encouragement today.  Educated re: rationale for food texture modifications, patient verbalizing comprehension of  safe swallowing strategies with minimal verbal and demonstration cueing used today.   SLP Discharge Planning   SLP Plan Meal follow up, small slightly thick sips by straw?   SLP Discharge Recommendation Acute Rehab Center-Motivated patient will benefit from intensive, interdisciplinary therapy.  Anticipate will be able to tolerate 3 hours of therapy per day   SLP Rationale for DC Rec Significantly below baseline, likely dysphagia and communication needs at baseline   SLP Brief overview of current status  Video swallow study repeated with improved results: arivn BURNS and care transition re recommendation of minced and moist diet with slightly thick liquids, 1:1 feeding assistance by spoon still needed at this time.  Eat only when fully alert and upright at least 80 degrees for meals.   Total Session Time   Total Session Time (sum of timed and untimed services) 50   Psychosocial Support   Trust  Relationship/Rapport care explained;emotional support provided;questions encouraged;reassurance provided;thoughts/feelings acknowledged

## 2023-11-15 NOTE — PLAN OF CARE
Goal Outcome Evaluation:  0700-1830, pt transferred to Ortho-Spine    A&O x 3, disoriented to time, pt states she is at a hospital but unsure of what location/city. Neuro's intact. VSS on RA. Tele: SR. Assist x2, turn and repo q2h. Up in chair for 2 hours today. Pureed diet w/ moderately thick liquid, poor intake. Meds crushed in apple sauce. R PIV CDI/SL. R CVC heparin locked. L midline infusing PPN @ml/hr . Back incision CHIKI, steri strips intact. R leg abrasion w/ dry scab, CHIKI. R skin tear on R arm, mepi in place. Skin tear on low back mepi in place. Wound care completed for abdominal folds. Pain controlled w/ scheduled Tylenol. Low UOP. Small BM, soft. Continue plan of care.

## 2023-11-15 NOTE — PLAN OF CARE
Goal Outcome Evaluation:    Pt A&Ox4. Neuros intact. Denies N/T. Able to get up earlier this morning with PT to recliner chair. Requires A2 Lift for transfers. Patient completed video swallow study this AM, see progress notes- advanced to minced/moist textures mildly thick liquids. Requires 1:1 assistance with feeding. Medications crushed in pudding. Remains on IV TPN to LUE midline. PIV SL to R forearm, Levaquin given this afternoon. R CVC for HD- CDI. Patient returned from HD around 1530, writer assisted oncoming shift with settling patient back into room and helping manage pain- PRN oxycodone given x1. Discharge to ARU when medically cleared.

## 2023-11-15 NOTE — PROGRESS NOTES
Care Management Follow Up    Length of Stay (days): 21    Expected Discharge Date: 11/17/2023     Concerns to be Addressed: discharge planning     Patient plan of care discussed at interdisciplinary rounds: Yes    Anticipated Discharge Disposition: Home, Home Care, Dialysis Services, Skilled Nursing Facility, Transitional Care     Anticipated Discharge Services: Transportation Services  Anticipated Discharge DME: Other (see comment) (to be determined)    Patient/family educated on Medicare website which has current facility and service quality ratings:    Education Provided on the Discharge Plan: Yes  Patient/Family in Agreement with the Plan: unable to assess (discussed mutiple discharge choices)    Referrals Placed by CM/SW: External Care Coordination, Specialty Providers  Private pay costs discussed: Not applicable    Additional Information:  PT has recommended ARU.  She is progressing on intake and plan is discontinuation of PPN tomorrow per hospitalist note.  Neurosurgery preference is ARU.    Referral sent this morning to [place] [facility].  Awaiting response.    Edilma Lebron RN  Inpatient Float Care Coordinator

## 2023-11-16 ENCOUNTER — APPOINTMENT (OUTPATIENT)
Dept: SPEECH THERAPY | Facility: CLINIC | Age: 83
DRG: 453 | End: 2023-11-16
Attending: NEUROLOGICAL SURGERY
Payer: COMMERCIAL

## 2023-11-16 ENCOUNTER — APPOINTMENT (OUTPATIENT)
Dept: OCCUPATIONAL THERAPY | Facility: CLINIC | Age: 83
DRG: 453 | End: 2023-11-16
Attending: NEUROLOGICAL SURGERY
Payer: COMMERCIAL

## 2023-11-16 ENCOUNTER — APPOINTMENT (OUTPATIENT)
Dept: PHYSICAL THERAPY | Facility: CLINIC | Age: 83
DRG: 453 | End: 2023-11-16
Attending: NEUROLOGICAL SURGERY
Payer: COMMERCIAL

## 2023-11-16 LAB
ALBUMIN SERPL BCG-MCNC: 3.4 G/DL (ref 3.5–5.2)
ANION GAP SERPL CALCULATED.3IONS-SCNC: 13 MMOL/L (ref 7–15)
BUN SERPL-MCNC: 40.9 MG/DL (ref 8–23)
CALCIUM SERPL-MCNC: 8.7 MG/DL (ref 8.8–10.2)
CHLORIDE SERPL-SCNC: 95 MMOL/L (ref 98–107)
CREAT SERPL-MCNC: 4.31 MG/DL (ref 0.51–0.95)
DEPRECATED HCO3 PLAS-SCNC: 22 MMOL/L (ref 22–29)
EGFRCR SERPLBLD CKD-EPI 2021: 10 ML/MIN/1.73M2
ERYTHROCYTE [DISTWIDTH] IN BLOOD BY AUTOMATED COUNT: 16.1 % (ref 10–15)
GLUCOSE BLDC GLUCOMTR-MCNC: 114 MG/DL (ref 70–99)
GLUCOSE BLDC GLUCOMTR-MCNC: 124 MG/DL (ref 70–99)
GLUCOSE BLDC GLUCOMTR-MCNC: 130 MG/DL (ref 70–99)
GLUCOSE BLDC GLUCOMTR-MCNC: 131 MG/DL (ref 70–99)
GLUCOSE BLDC GLUCOMTR-MCNC: 134 MG/DL (ref 70–99)
GLUCOSE BLDC GLUCOMTR-MCNC: 139 MG/DL (ref 70–99)
GLUCOSE BLDC GLUCOMTR-MCNC: 143 MG/DL (ref 70–99)
GLUCOSE SERPL-MCNC: 152 MG/DL (ref 70–99)
HCT VFR BLD AUTO: 26.8 % (ref 35–47)
HGB BLD-MCNC: 8.6 G/DL (ref 11.7–15.7)
MCH RBC QN AUTO: 29 PG (ref 26.5–33)
MCHC RBC AUTO-ENTMCNC: 32.1 G/DL (ref 31.5–36.5)
MCV RBC AUTO: 90 FL (ref 78–100)
PHOSPHATE SERPL-MCNC: 5.6 MG/DL (ref 2.5–4.5)
PLATELET # BLD AUTO: 158 10E3/UL (ref 150–450)
POTASSIUM SERPL-SCNC: 4.6 MMOL/L (ref 3.4–5.3)
RBC # BLD AUTO: 2.97 10E6/UL (ref 3.8–5.2)
SODIUM SERPL-SCNC: 130 MMOL/L (ref 135–145)
WBC # BLD AUTO: 6.5 10E3/UL (ref 4–11)

## 2023-11-16 PROCEDURE — 250N000011 HC RX IP 250 OP 636: Mod: JZ | Performed by: NURSE PRACTITIONER

## 2023-11-16 PROCEDURE — 97530 THERAPEUTIC ACTIVITIES: CPT | Mod: GP | Performed by: PHYSICAL THERAPIST

## 2023-11-16 PROCEDURE — 250N000013 HC RX MED GY IP 250 OP 250 PS 637: Performed by: INTERNAL MEDICINE

## 2023-11-16 PROCEDURE — C9113 INJ PANTOPRAZOLE SODIUM, VIA: HCPCS | Mod: JZ | Performed by: NEUROLOGICAL SURGERY

## 2023-11-16 PROCEDURE — 92526 ORAL FUNCTION THERAPY: CPT | Mod: GN | Performed by: SPEECH-LANGUAGE PATHOLOGIST

## 2023-11-16 PROCEDURE — 36415 COLL VENOUS BLD VENIPUNCTURE: CPT | Performed by: INTERNAL MEDICINE

## 2023-11-16 PROCEDURE — 250N000009 HC RX 250: Mod: JZ | Performed by: HOSPITALIST

## 2023-11-16 PROCEDURE — B4185 PARENTERAL SOL 10 GM LIPIDS: HCPCS | Mod: JZ | Performed by: HOSPITALIST

## 2023-11-16 PROCEDURE — 250N000013 HC RX MED GY IP 250 OP 250 PS 637: Performed by: STUDENT IN AN ORGANIZED HEALTH CARE EDUCATION/TRAINING PROGRAM

## 2023-11-16 PROCEDURE — 250N000013 HC RX MED GY IP 250 OP 250 PS 637: Performed by: NURSE PRACTITIONER

## 2023-11-16 PROCEDURE — 250N000011 HC RX IP 250 OP 636: Mod: JZ | Performed by: NEUROLOGICAL SURGERY

## 2023-11-16 PROCEDURE — 97535 SELF CARE MNGMENT TRAINING: CPT | Mod: GO

## 2023-11-16 PROCEDURE — 80069 RENAL FUNCTION PANEL: CPT | Performed by: INTERNAL MEDICINE

## 2023-11-16 PROCEDURE — 250N000011 HC RX IP 250 OP 636: Performed by: INTERNAL MEDICINE

## 2023-11-16 PROCEDURE — 85027 COMPLETE CBC AUTOMATED: CPT | Performed by: INTERNAL MEDICINE

## 2023-11-16 PROCEDURE — 99232 SBSQ HOSP IP/OBS MODERATE 35: CPT | Performed by: STUDENT IN AN ORGANIZED HEALTH CARE EDUCATION/TRAINING PROGRAM

## 2023-11-16 PROCEDURE — 250N000013 HC RX MED GY IP 250 OP 250 PS 637: Performed by: HOSPITALIST

## 2023-11-16 PROCEDURE — 120N000001 HC R&B MED SURG/OB

## 2023-11-16 RX ADMIN — DILTIAZEM HYDROCHLORIDE 180 MG: 180 CAPSULE, COATED, EXTENDED RELEASE ORAL at 11:08

## 2023-11-16 RX ADMIN — Medication 2 SPRAY: at 22:32

## 2023-11-16 RX ADMIN — HEPARIN SODIUM 5000 UNITS: 5000 INJECTION, SOLUTION INTRAVENOUS; SUBCUTANEOUS at 15:05

## 2023-11-16 RX ADMIN — HYDRALAZINE HYDROCHLORIDE 10 MG: 10 TABLET ORAL at 05:40

## 2023-11-16 RX ADMIN — MICONAZOLE NITRATE: 2 POWDER TOPICAL at 21:02

## 2023-11-16 RX ADMIN — MICONAZOLE NITRATE: 2 POWDER TOPICAL at 11:26

## 2023-11-16 RX ADMIN — HEPARIN SODIUM 5000 UNITS: 5000 INJECTION, SOLUTION INTRAVENOUS; SUBCUTANEOUS at 00:59

## 2023-11-16 RX ADMIN — POLYETHYLENE GLYCOL 3350 17 G: 17 POWDER, FOR SOLUTION ORAL at 11:08

## 2023-11-16 RX ADMIN — LEVOTHYROXINE SODIUM 100 MCG: 100 TABLET ORAL at 11:08

## 2023-11-16 RX ADMIN — HYDRALAZINE HYDROCHLORIDE 10 MG: 10 TABLET ORAL at 22:24

## 2023-11-16 RX ADMIN — SENNOSIDES AND DOCUSATE SODIUM 1 TABLET: 8.6; 5 TABLET ORAL at 11:08

## 2023-11-16 RX ADMIN — HYDRALAZINE HYDROCHLORIDE 10 MG: 10 TABLET ORAL at 15:05

## 2023-11-16 RX ADMIN — OXYCODONE HYDROCHLORIDE 5 MG: 5 TABLET ORAL at 01:07

## 2023-11-16 RX ADMIN — ACETAMINOPHEN 650 MG: 325 TABLET, FILM COATED ORAL at 20:42

## 2023-11-16 RX ADMIN — OXYCODONE HYDROCHLORIDE 5 MG: 5 TABLET ORAL at 05:40

## 2023-11-16 RX ADMIN — NYSTATIN: 100000 CREAM TOPICAL at 21:02

## 2023-11-16 RX ADMIN — OLIVE OIL AND SOYBEAN OIL 250 ML: 16; 4 INJECTION, EMULSION INTRAVENOUS at 20:49

## 2023-11-16 RX ADMIN — Medication 400 MG: at 11:08

## 2023-11-16 RX ADMIN — Medication 5 ML: at 18:25

## 2023-11-16 RX ADMIN — ONDANSETRON 4 MG: 2 INJECTION INTRAMUSCULAR; INTRAVENOUS at 12:13

## 2023-11-16 RX ADMIN — TRAZODONE HYDROCHLORIDE 25 MG: 50 TABLET ORAL at 22:24

## 2023-11-16 RX ADMIN — Medication 2 SPRAY: at 14:00

## 2023-11-16 RX ADMIN — HEPARIN SODIUM 5000 UNITS: 5000 INJECTION, SOLUTION INTRAVENOUS; SUBCUTANEOUS at 11:08

## 2023-11-16 RX ADMIN — PANTOPRAZOLE SODIUM 40 MG: 40 INJECTION, POWDER, FOR SOLUTION INTRAVENOUS at 11:08

## 2023-11-16 ASSESSMENT — ACTIVITIES OF DAILY LIVING (ADL)
ADLS_ACUITY_SCORE: 46
ADLS_ACUITY_SCORE: 46
ADLS_ACUITY_SCORE: 42
ADLS_ACUITY_SCORE: 46
ADLS_ACUITY_SCORE: 46
ADLS_ACUITY_SCORE: 42
ADLS_ACUITY_SCORE: 46
ADLS_ACUITY_SCORE: 42
ADLS_ACUITY_SCORE: 46

## 2023-11-16 NOTE — PROGRESS NOTES
Chart reviewed    Thoracic 11 to pelvis robotic decompression and fusion   -22 Days Post-Op     Chart reviewed no acute events overnight noted. SW coordinating placement.  Patient needing to work with therapies.  Recommend ARU at discharge.

## 2023-11-16 NOTE — PROGRESS NOTES
, 112 and 149, order parameter not met. BLE edema noted. CMS intact. Discharge pending TCU placement. Call light within reach.

## 2023-11-16 NOTE — PLAN OF CARE
"Goal Outcome Evaluation:    Pt A&O x4 with intermittent confusion. When we came onto the unit pt very tearful and anxious stating \"I can't lift my legs\". Daughter came and was able to calm pt with stress balls and leg rubs. RN gave pt Hydroxyzine. Pt diet advanced and she tolerated it well. Pure wic in place and putting out adequate urin. Pt cleaned under breasts and in ramona folds and nystatan placed for reddened excoriated areas. Pills crushed and placed in pudding. Pt not OOB this shift. TPN started and running. Back steri strips intact and open to air. Pt reporting pain with relief from Oxy and tylenol.     "

## 2023-11-16 NOTE — PROGRESS NOTES
AO x4. 2 assist w/ lift. States pain in R hip. Oxy for pain. Turned and repo Q 1-2 hrs. Scattered bruising. Redness/skin breakdown under breast and groin area. Purewick in place. CHG bath completed. Antifungal powder applied. TPN and lipids infusing via midline. Crushed pills in pudding. Mepilex on sacral. BS checks.

## 2023-11-16 NOTE — PROGRESS NOTES
[facility]    Hospitalist Progress Note    Interval History   Patient much more awake and alert today than ever seen before. She holds a conversation with me regarding her day yesterday how she enjoyed eating and that she is very tired. She reports hip pain when lying in bed at night. She also has other various site of pain (TDC, etc) but seemed in good spirits today.     Assessment & Plan   Summary: Mya Hui is a 83 year old female with PMH Scoliosis, Lumbar radiculopathy, HTN, HLP, DM2, Hypothyroidism, CKD stage 3, Morbid obesity, GERD, Sliding hiatal hernia, Overactive bladder, OA, Chronic right shoulder pain/rotator cuff arthropathy, Migraines, Asymptomatic carotid artery stenosis who was admitted on 10/25/2023 with an elective T11 to pelvis robotic decompression and fusion.  Surgery was performed on 10/25/23 under general anesthesia.  Surgery was complicated with development of arrhythmia with significant hypotension and noted 3.5 L blood loss per Op Note and Anesthesia postprocedure evaluation note.  Patient received IV fluids, 6 units of pRBCs and 2 units of FFP intra-op.  ST changes were noted intraoperatively.  Patient remained intubated and started on Levophed and was transferred to the ICU due to hemorrhagic shock.  She required vasopressor support with Levophed until 10/27. Transferred out of the ICU with hospitalist comanging since 11/03    Post-op hemorrhagic shock, resolved  Shocked liver-resolved  Type 2 MI due to hemorrhagic shock, improved  Surgery was complicated with development of arrhythmia with significant hypotension and noted 3.5 L blood loss per Op Note and Anesthesia postprocedure evaluation note.  Patient received IV fluids, 6 units of pRBCs and 2 units of FFP intra-op. Patient received additional 2 units FFP, 10 units of cryo and 1 unit of PLT while in the ICU. LFTs continue to improve 11/5-11/7.    Encephalopathy, multifactorial, improving  Incidental  finding of left basal ganglia restricted diffusion  Patient was noted to be encephalopathic for which Vascular Neurology was consulted.  Brain MRI was completed and noted a left basal ganglia small focus of restricted diffusion but not a cause for encephalopathy and felt it was likely multifactorial.  EEG monitor was completed and negative and subsequently stopped.  Suspect due to uremic encephalopathy and sepsis.   Encephalopathy improved 11/6  Delirium prevention:   - Reorient patient at each interaction.  - Keep room lights on and blinds open during day (8am-8pm), low lights 8pm-8am.  - Minimize interruptions of sleep at night (consolidate vitals, nursing assessments, medications).  - Avoid sedating medications as able.    - Vascular Neurology recommended repeat brain MRI in 2-3 months.      LISA/ATN requiring hemodialysis  Anion gap metabolic acidosis due to above  CKD stage 3a  Hypoalbuminemia  Anasarca  Baseline creatinine between 1.1-1.4 with GFR in the 40-50's.   Due to development of LISA-D Nephrology was consulted and tunnel dialysis catheter placed 10/30 and has been undergoing HD Mon, Wed, Fri (last session was today, 11/3/2023).       - Continue dialysis, now on MWF schedule. Will need outpatient dialysis as we await recovery of renal function    Aspiration/ventilator associated PNA (Enterobacter, stenotrophomonas and E. Coli)  Acute hypoxic respiratory failure resolved  Leukocytosis due to above  Extubated 11/2. Patient has also developed aspiration/ventilator associated pneumonia with sputum Cx growing Enterobacter, stenotrophomonas and E. coli and was initially started on Zoysn and switched to Meropenem on 11/1/2023.  Currently continued on Meropenem and awaiting final cultures. Procal 3. Sputum cultures growing Enterobacter, E coli, Stenotrophomonas.    - completed 14 days course of antibiotics 11/14  - Encourage use of IS/Flutter valve.      Dysphagia-improving  Moderate malnutrition  Failed SLP  "assessment. Bedside feeding tube placement coiling due to hiatal hernia. Started PPN     - improving swallow function. SLP does not recommend pursing PEG. Can consider NG for temporary nutrition. Will follow intake today and tomorrow    Scoliosis  Lumbar radiculopathy  S/p T11 to pelvis robotic decompression and fusion  - Neurosurgery is managing.    HTN  NSVT  - continue diltiazem and hydralazine  - holding PTA lisinopril for ATN    HLP  - Hold PTA pravastatin 40 mg/d to reduce pill burden     Type 2 DM, controlled  Stress induced hyperglycemia  - no insulin needs recently, possible due to antibiotic use  - consider reduction in glucose monitoring    Hypothyroidism   - continue PTA synthroid     GERD  Sliding hiatal hernia  - PTA PPI    OA  Chronic right shoulder pain/rotator cuff arthropathy  Chronic Pain  - Pain/analgesic management per Neurosurgery.      Obesity   Suspected ANAYELI  Body mass index is 37.64 kg/m .  Increase in all-cause morbidity and mortality.   - Follow up with PCP regarding ongoing management.    - Monitor O2 saturations.    - Recommend outpatient sleep study.      Overactive bladder  - Holding PTA Ditropan XL 10 mg/d.      Migraines: Pain/analgesic management per Neurosurgery.      Asymptomatic carotid artery stenosis.    Hypomagnesemia   - RN replacement protocols    Clinically Significant Risk Factors              # Hypoalbuminemia: Lowest albumin = 2.5 g/dL at 11/1/2023  4:19 AM, will monitor as appropriate     # Hypertension: Noted on problem list        # Obesity: Estimated body mass index is 33.86 kg/m  as calculated from the following:    Height as of this encounter: 1.651 m (5' 5\").    Weight as of this encounter: 92.3 kg (203 lb 7.8 oz).   # Moderate Malnutrition: based on nutrition assessment      # Financial/Environmental Concerns: none            Diet: Snacks/Supplements Adult: Gelatein Plus; With Meals  Combination Diet Minced and Moist Diet (level 5); Slightly Thick (level 1) (By " teaspoon)  parenteral nutrition - Clinimix E    DVT Prophylaxis: Heparin SQ  Spicer Catheter: Not present  Lines: PRESENT      CVC Double Lumen Right Internal jugular Non - valved (open ended);Tunneled-Site Assessment: WDL      Cardiac Monitoring: None  Code Status: Full Code    Disposition: ARU assessing    Nivia Lester DO  Hospitalist Service  [facility]  Securely message with [employer] (more info)  Text page via [employer] Paging/Directory     Data reviewed today: I reviewed all new labs and imaging results over the last 24 hours.     Medical Decision Making       45 MINUTES SPENT BY ME on the date of service doing chart review, history, exam, documentation & further activities per the note.       Physical Exam   Temp: 97.9  F (36.6  C) Temp src: Axillary BP: 139/73 Pulse: 90   Resp: 16 SpO2: 98 % O2 Device: None (Room air) Oxygen Delivery: 1 LPM  Vitals:    11/14/23 0903 11/15/23 0621 11/16/23 0204   Weight: 100 kg (220 lb 7.4 oz) 99 kg (218 lb 4.1 oz) 92.3 kg (203 lb 7.8 oz)     Vital Signs with Ranges  Temp:  [97.9  F (36.6  C)-98  F (36.7  C)] 97.9  F (36.6  C)  Pulse:  [75-90] 90  Resp:  [16] 16  BP: (111-152)/(49-87) 139/73  SpO2:  [92 %-98 %] 98 %  I/O last 3 completed shifts:  In: 0   Out: 3650 [Urine:650; Other:3000]  O2 requirements: no    Constitutional: Elderly female appears ill, generalized anasarca noted, TDC in R chest  HEENT: Eyes nonicteric, oral mucosa moist  Cardiovascular: RRR  Respiratory: no crackles noted today  Vascular: Trace BLE pitting edema  GI: Normoactive bowel sounds, appears nontender  Skin/Integumen: No rashes    Medications    dextrose      parenteral nutrition - Clinimix E 40 mL/hr at 11/15/23 2054        - MEDICATION INSTRUCTIONS for Dialysis Patients -   Does not apply See Admin Instructions    alteplase  2 mg Intracatheter Once in dialysis/CRRT    alteplase  2 mg Intracatheter Once in dialysis/CRRT    artificial saliva  2 spray Swish & Spit 4x Daily    B  and C vitamin Complex with folic acid  5 mL Oral or Feeding Tube Daily    diltiazem ER COATED BEADS  180 mg Oral Daily    heparin ANTICOAGULANT  5,000 Units Subcutaneous Q8H    hydrALAZINE  10 mg Oral or Feeding Tube Q8H    insulin aspart  1-6 Units Subcutaneous Q4H    levothyroxine  100 mcg Oral or NG Tube QAM AC    lipids plant base  250 mL Intravenous Q24H    magnesium oxide  400 mg Oral Daily    nystatin   Topical BID    pantoprazole  40 mg Oral or Feeding Tube QAM AC    Or    pantoprazole  40 mg Intravenous QAM AC    polyethylene glycol  17 g Oral or NG Tube Daily    senna-docusate  1 tablet Oral or NG Tube BID    sodium chloride (PF)  3 mL Intracatheter Q8H    traZODone  25 mg Oral At Bedtime       Data   Recent Labs   Lab 11/16/23  0843 11/16/23  0539 11/16/23  0032 11/15/23  1301 11/15/23  0801 11/14/23  1644 11/14/23  1615 11/14/23  0815 11/14/23  0614 11/13/23  0745 11/13/23  0534 11/12/23  0825 11/12/23  0718   WBC  --   --   --   --  7.1  --   --   --   --   --  8.6  --   --    HGB  --   --   --   --  9.1*  --   --   --   --   --  8.2*  --   --    MCV  --   --   --   --  94  --   --   --   --   --  90  --   --    PLT  --   --   --   --  211  --   --   --  129*  --  210  --   --    NA  --   --   --   --  133*  --  133*  --   --   --  130*  --  130*   POTASSIUM  --   --   --   --  4.8  --  4.5  --   --   --  4.4  --  4.1   CHLORIDE  --   --   --   --  96*  --  96*  --   --   --  94*  --  93*   CO2  --   --   --   --  25  --  27  --   --   --  24  --  24   BUN  --   --   --   --  47.9*  --  40.0*  --   --   --  70.8*  --  60.8*   CR  --   --   --   --  4.62*  --  4.01*  --   --   --  5.94*  --  4.97*   ANIONGAP  --   --   --   --  12  --  10  --   --   --  12  --  13   TERESA  --   --   --   --  8.8  --  8.2*  --   --   --  8.4*  --  8.4*   * 130* 114*   < > 120*   < > 114*   < >  --    < > 110*   < > 105*   ALBUMIN  --   --   --   --  3.1*  --  3.0*  --   --   --  2.7*  --  2.9*   PROTTOTAL  --   --    --   --   --   --   --   --   --   --  5.6*  --  5.8*   BILITOTAL  --   --   --   --   --   --   --   --   --   --  0.3  --  0.3   ALKPHOS  --   --   --   --   --   --   --   --   --   --  97  --  92   ALT  --   --   --   --   --   --   --   --   --   --  15  --  16   AST  --   --   --   --   --   --   --   --   --   --  27  --  24    < > = values in this interval not displayed.       Imaging:   No results found for this or any previous visit (from the past 24 hour(s)).

## 2023-11-17 ENCOUNTER — APPOINTMENT (OUTPATIENT)
Dept: OCCUPATIONAL THERAPY | Facility: CLINIC | Age: 83
DRG: 453 | End: 2023-11-17
Attending: NEUROLOGICAL SURGERY
Payer: COMMERCIAL

## 2023-11-17 LAB
ALBUMIN SERPL BCG-MCNC: 3.1 G/DL (ref 3.5–5.2)
ANION GAP SERPL CALCULATED.3IONS-SCNC: 13 MMOL/L (ref 7–15)
BUN SERPL-MCNC: 44.8 MG/DL (ref 8–23)
CALCIUM SERPL-MCNC: 8.5 MG/DL (ref 8.8–10.2)
CHLORIDE SERPL-SCNC: 99 MMOL/L (ref 98–107)
CREAT SERPL-MCNC: 4.64 MG/DL (ref 0.51–0.95)
DEPRECATED HCO3 PLAS-SCNC: 22 MMOL/L (ref 22–29)
EGFRCR SERPLBLD CKD-EPI 2021: 9 ML/MIN/1.73M2
ERYTHROCYTE [DISTWIDTH] IN BLOOD BY AUTOMATED COUNT: 16 % (ref 10–15)
GLUCOSE BLDC GLUCOMTR-MCNC: 106 MG/DL (ref 70–99)
GLUCOSE BLDC GLUCOMTR-MCNC: 111 MG/DL (ref 70–99)
GLUCOSE BLDC GLUCOMTR-MCNC: 114 MG/DL (ref 70–99)
GLUCOSE BLDC GLUCOMTR-MCNC: 119 MG/DL (ref 70–99)
GLUCOSE BLDC GLUCOMTR-MCNC: 120 MG/DL (ref 70–99)
GLUCOSE BLDC GLUCOMTR-MCNC: 125 MG/DL (ref 70–99)
GLUCOSE BLDC GLUCOMTR-MCNC: 139 MG/DL (ref 70–99)
GLUCOSE BLDC GLUCOMTR-MCNC: 94 MG/DL (ref 70–99)
GLUCOSE SERPL-MCNC: 120 MG/DL (ref 70–99)
HCT VFR BLD AUTO: 25 % (ref 35–47)
HGB BLD-MCNC: 7.6 G/DL (ref 11.7–15.7)
MCH RBC QN AUTO: 28.3 PG (ref 26.5–33)
MCHC RBC AUTO-ENTMCNC: 30.4 G/DL (ref 31.5–36.5)
MCV RBC AUTO: 93 FL (ref 78–100)
PHOSPHATE SERPL-MCNC: 6 MG/DL (ref 2.5–4.5)
PLATELET # BLD AUTO: 156 10E3/UL (ref 150–450)
POTASSIUM SERPL-SCNC: 4.4 MMOL/L (ref 3.4–5.3)
RBC # BLD AUTO: 2.69 10E6/UL (ref 3.8–5.2)
SODIUM SERPL-SCNC: 134 MMOL/L (ref 135–145)
WBC # BLD AUTO: 5.6 10E3/UL (ref 4–11)

## 2023-11-17 PROCEDURE — 99207 PR CDG-CUT & PASTE-POTENTIAL IMPACT ON LEVEL: CPT | Performed by: STUDENT IN AN ORGANIZED HEALTH CARE EDUCATION/TRAINING PROGRAM

## 2023-11-17 PROCEDURE — 250N000011 HC RX IP 250 OP 636: Performed by: INTERNAL MEDICINE

## 2023-11-17 PROCEDURE — 634N000001 HC RX 634: Mod: JZ | Performed by: INTERNAL MEDICINE

## 2023-11-17 PROCEDURE — 97110 THERAPEUTIC EXERCISES: CPT | Mod: GO

## 2023-11-17 PROCEDURE — 250N000013 HC RX MED GY IP 250 OP 250 PS 637: Performed by: INTERNAL MEDICINE

## 2023-11-17 PROCEDURE — 250N000013 HC RX MED GY IP 250 OP 250 PS 637: Performed by: NURSE PRACTITIONER

## 2023-11-17 PROCEDURE — C9113 INJ PANTOPRAZOLE SODIUM, VIA: HCPCS | Mod: JZ | Performed by: NEUROLOGICAL SURGERY

## 2023-11-17 PROCEDURE — 99232 SBSQ HOSP IP/OBS MODERATE 35: CPT | Performed by: INTERNAL MEDICINE

## 2023-11-17 PROCEDURE — 99232 SBSQ HOSP IP/OBS MODERATE 35: CPT | Performed by: STUDENT IN AN ORGANIZED HEALTH CARE EDUCATION/TRAINING PROGRAM

## 2023-11-17 PROCEDURE — 80069 RENAL FUNCTION PANEL: CPT | Performed by: INTERNAL MEDICINE

## 2023-11-17 PROCEDURE — 85027 COMPLETE CBC AUTOMATED: CPT | Performed by: INTERNAL MEDICINE

## 2023-11-17 PROCEDURE — 250N000013 HC RX MED GY IP 250 OP 250 PS 637: Performed by: HOSPITALIST

## 2023-11-17 PROCEDURE — 250N000009 HC RX 250: Mod: JZ | Performed by: HOSPITALIST

## 2023-11-17 PROCEDURE — 97530 THERAPEUTIC ACTIVITIES: CPT | Mod: GO

## 2023-11-17 PROCEDURE — 250N000013 HC RX MED GY IP 250 OP 250 PS 637: Performed by: PHYSICIAN ASSISTANT

## 2023-11-17 PROCEDURE — 90937 HEMODIALYSIS REPEATED EVAL: CPT

## 2023-11-17 PROCEDURE — 120N000001 HC R&B MED SURG/OB

## 2023-11-17 PROCEDURE — 250N000011 HC RX IP 250 OP 636: Mod: JZ | Performed by: NEUROLOGICAL SURGERY

## 2023-11-17 PROCEDURE — 250N000013 HC RX MED GY IP 250 OP 250 PS 637: Performed by: STUDENT IN AN ORGANIZED HEALTH CARE EDUCATION/TRAINING PROGRAM

## 2023-11-17 PROCEDURE — B4185 PARENTERAL SOL 10 GM LIPIDS: HCPCS | Mod: JZ | Performed by: HOSPITALIST

## 2023-11-17 RX ORDER — HEPARIN SODIUM 1000 [USP'U]/ML
500 INJECTION, SOLUTION INTRAVENOUS; SUBCUTANEOUS CONTINUOUS
Status: DISCONTINUED | OUTPATIENT
Start: 2023-11-17 | End: 2023-11-18

## 2023-11-17 RX ORDER — ALBUMIN (HUMAN) 12.5 G/50ML
50 SOLUTION INTRAVENOUS
Status: DISCONTINUED | OUTPATIENT
Start: 2023-11-17 | End: 2023-11-18

## 2023-11-17 RX ADMIN — LEVOTHYROXINE SODIUM 100 MCG: 100 TABLET ORAL at 07:27

## 2023-11-17 RX ADMIN — Medication 2 SPRAY: at 13:03

## 2023-11-17 RX ADMIN — HYDRALAZINE HYDROCHLORIDE 10 MG: 10 TABLET ORAL at 13:03

## 2023-11-17 RX ADMIN — DILTIAZEM HYDROCHLORIDE 180 MG: 180 CAPSULE, COATED, EXTENDED RELEASE ORAL at 09:30

## 2023-11-17 RX ADMIN — ACETAMINOPHEN 650 MG: 325 TABLET, FILM COATED ORAL at 09:31

## 2023-11-17 RX ADMIN — Medication 2 SPRAY: at 18:11

## 2023-11-17 RX ADMIN — HYDRALAZINE HYDROCHLORIDE 10 MG: 10 TABLET ORAL at 06:17

## 2023-11-17 RX ADMIN — SENNOSIDES AND DOCUSATE SODIUM 1 TABLET: 8.6; 5 TABLET ORAL at 21:43

## 2023-11-17 RX ADMIN — OLIVE OIL AND SOYBEAN OIL 250 ML: 16; 4 INJECTION, EMULSION INTRAVENOUS at 21:36

## 2023-11-17 RX ADMIN — PANTOPRAZOLE SODIUM 40 MG: 40 INJECTION, POWDER, FOR SOLUTION INTRAVENOUS at 07:23

## 2023-11-17 RX ADMIN — EPOETIN ALFA-EPBX 10000 UNITS: 10000 INJECTION, SOLUTION INTRAVENOUS; SUBCUTANEOUS at 09:10

## 2023-11-17 RX ADMIN — Medication 2 SPRAY: at 21:42

## 2023-11-17 RX ADMIN — HEPARIN SODIUM 5000 UNITS: 5000 INJECTION, SOLUTION INTRAVENOUS; SUBCUTANEOUS at 16:03

## 2023-11-17 RX ADMIN — TRAZODONE HYDROCHLORIDE 25 MG: 50 TABLET ORAL at 21:43

## 2023-11-17 RX ADMIN — OXYCODONE HYDROCHLORIDE 5 MG: 5 TABLET ORAL at 09:29

## 2023-11-17 RX ADMIN — ACETAMINOPHEN 650 MG: 325 TABLET, FILM COATED ORAL at 13:03

## 2023-11-17 RX ADMIN — Medication 5 ML: at 18:11

## 2023-11-17 RX ADMIN — HYDRALAZINE HYDROCHLORIDE 10 MG: 10 TABLET ORAL at 21:43

## 2023-11-17 RX ADMIN — HYDROXYZINE HYDROCHLORIDE 10 MG: 10 TABLET ORAL at 09:31

## 2023-11-17 RX ADMIN — HEPARIN SODIUM 500 UNITS/HR: 1000 INJECTION INTRAVENOUS; SUBCUTANEOUS at 08:40

## 2023-11-17 RX ADMIN — MICONAZOLE NITRATE: 2 POWDER TOPICAL at 13:10

## 2023-11-17 RX ADMIN — HEPARIN SODIUM 500 UNITS: 1000 INJECTION INTRAVENOUS; SUBCUTANEOUS at 08:40

## 2023-11-17 RX ADMIN — NYSTATIN: 100000 CREAM TOPICAL at 13:11

## 2023-11-17 RX ADMIN — HEPARIN SODIUM 5000 UNITS: 5000 INJECTION, SOLUTION INTRAVENOUS; SUBCUTANEOUS at 00:03

## 2023-11-17 RX ADMIN — Medication 400 MG: at 09:31

## 2023-11-17 ASSESSMENT — ACTIVITIES OF DAILY LIVING (ADL)
ADLS_ACUITY_SCORE: 41
ADLS_ACUITY_SCORE: 45
ADLS_ACUITY_SCORE: 44
ADLS_ACUITY_SCORE: 43
ADLS_ACUITY_SCORE: 45
ADLS_ACUITY_SCORE: 41
ADLS_ACUITY_SCORE: 45
ADLS_ACUITY_SCORE: 40
ADLS_ACUITY_SCORE: 40
ADLS_ACUITY_SCORE: 46

## 2023-11-17 NOTE — PLAN OF CARE
Patient A/Ox4, VSS,RA. Incision CDI, CMS intact. Denies significant pain. PIV WNL,lock.   Good intake, assisted with feeding and good output.   Educated regarding medication & pain management- verbalized of understanding.

## 2023-11-17 NOTE — PLAN OF CARE
Goal Outcom  Patient vital signs are at baseline: yes   Patient able to ambulate as they were prior to admission or with assist devices provided by therapies during their stay:  no  Patient MUST void prior to discharge:  yes  Patient able to tolerate oral intake:  yes  Pain has adequate pain control using Oral analgesics:  yes   Does patient have an identified :  no  Has goal D/C date and time been discussed with patient:      A & O X3, disoriented to time. Up with lift. External catheter in place with adequate urine output. Midline CDI.

## 2023-11-17 NOTE — PROGRESS NOTES
Pt not in room.    Chart reviewed    Thoracic 11 to pelvis robotic decompression and fusion   -23 Days Post-Op     Chart reviewed no acute events overnight noted. SW coordinating placement.  Patient needing to work with therapies.  Recommend ARU at discharge.

## 2023-11-17 NOTE — PROGRESS NOTES
Shift Summary [id number]    Admitting Diagnosis: Scoliosis of lumbar spine, unspecified scoliosis type [M41.9]  S/P lumbar spinal fusion [Z98.1]   Vitals WNL  Pain : denied pain during shift.   A&Ox4  Voiding : incontinent. External catheter.   Mobility : lift transfers. Working on sera study transfers with PT.   Tele NA  CMS : edema in BLE.   Lung Sounds diminished.  on room air.   GI : bowel incontinence. No bm during shift. TPN parenteral nutrition  Dressing : Midline CDI. CVC line is also intact.     Orders Placed This Encounter      Combination Diet Minced and Moist Diet (level 5); Slightly Thick (level 1) (By chip)       Plan:     Placement to ARU is pending.

## 2023-11-17 NOTE — PLAN OF CARE
Goal Outcome Evaluation:  Date/Time: 11/17: [id number]    Trauma/Ortho/Medical (Choose one): Spine    Diagnosis: T11-Pelvis robotic decompression and fusion  POD#: 23  Mental Status: Oriented x 4 but can be forgetful  Activity/dangle: Not up this shift but reported is up with 2 assist  Diet: Minced and moist with level 1 thick liq. Is a feeder  Pain: Denies  Spicer/Voiding: Incontinent of bladder. Has a purewick. Is on strict I and O  Tele/Restraints/Iso: None  02/LDA: RA. Has dialysis line on (R) chest. Has midline catheter and PIV  D/C Date: Unknown  Other Info: Had dialysis today. Has multiple steri-strips on back. Is on sliding scale coverage

## 2023-11-17 NOTE — PROGRESS NOTES
Potassium   Date Value Ref Range Status   11/17/2023 4.4 3.4 - 5.3 mmol/L Final   05/13/2022 4.5 3.4 - 5.3 mmol/L Final   01/06/2021 4.3 3.4 - 5.3 mmol/L Final     Potassium POCT   Date Value Ref Range Status   10/25/2023 4.7 3.4 - 5.3 mmol/L Final     Comment:     000     Hemoglobin   Date Value Ref Range Status   11/17/2023 7.6 (L) 11.7 - 15.7 g/dL Final   09/23/2020 13.1 11.7 - 15.7 g/dL Final     Creatinine   Date Value Ref Range Status   11/17/2023 4.64 (H) 0.51 - 0.95 mg/dL Final   01/06/2021 1.04 0.52 - 1.04 mg/dL Final     Urea Nitrogen   Date Value Ref Range Status   11/17/2023 44.8 (H) 8.0 - 23.0 mg/dL Final   05/13/2022 17 7 - 30 mg/dL Final   01/06/2021 21 7 - 30 mg/dL Final     Sodium   Date Value Ref Range Status   11/17/2023 134 (L) 135 - 145 mmol/L Final     Comment:     Reference intervals for this test were updated on 09/26/2023 to more accurately reflect our healthy population. There may be differences in the flagging of prior results with similar values performed with this method. Interpretation of those prior results can be made in the context of the updated reference intervals.    01/06/2021 144 133 - 144 mmol/L Final     INR   Date Value Ref Range Status   10/27/2023 1.57 (H) 0.85 - 1.15 Final       DIALYSIS PROCEDURE NOTE  Hepatitis status of previous patient on machine log was checked and verified ok to use with this patients hepatitis status.  Patient dialyzed for 3 hrs. on a K2 bath with a net fluid removal of  0L.  A BFR of 300/350 ml/min was obtained via a right cvc.     The treatment plan was discussed with Dr. Fish during the treatment.    Total heparin received during the treatment: 500 units bolus pre run and 500 units an hr on HD     Line flushed, clamped and capped with heparin 1:1000 4 mL (2000 units) per lumen     Meds  given: epogen    Complications: UF turned off during first hr of HD due to delta drop in BP.  No fluid was given and BP stabilized, UF kept off the remainder of  the MD Polina nixon notified.     Person educated: pre tx. Knowledge base minimal. Barriers to learning: none. Educated on procedure via verbal mode. The patient verbalized understanding. Pt prefers verbal education style.      ICEBOAT? Timeout performed pre-treatment  I: Patient was identified using 2 identifiers  C:  Consent Signed Yes  E: Equipment preventative maintenance is current and dialysis delivery system OK to use  B: Hepatitis B Surface Antigen: negative; Draw Date: 10/23/2023      Hepatitis B Surface Antibody: immune; Draw Date: 10/23/2023  O: Dialysis orders present and complete prior to treatment  A: Vascular access verified and assessed prior to treatment  T: Treatment was performed at a clinically appropriate time  ?: Patient was allowed to ask questions and address concerns prior to treatment  See Adult Hemodialysis flowsheet in [employer] for further details and post assessment.  Machine water alarm in place and functioning. Transducer pods intact and checked every 15min.   Pt returned via bed.  Chlorine/Chloramine water system checked every 4 hours.  Outpatient Dialysis at D     Patient repositioned every 2 hours during the treatment.  Post treatment report given to SHAYLA Cardozo RN regarding 0L of fluid removed, last BP of 147/77, and patient pain rating of 3/10. Back pain

## 2023-11-17 NOTE — PROGRESS NOTES
[facility]     Renal Progress Note       SHORTHAND KEY FOR MY NOTES:  c = with, s = without, p = after, a = before, x = except, asx = asymptomatic, tx = transplant or treatment, sx = symptoms or symptomatic, cx = canceled or culture, rxn = reaction, yday = yesterday, nl = normal, abx = antibiotics, fxn = function, dx = diagnosis, dz = disease, m/h = melena/hematochezia, c/d/l/ha = cramping/dizziness/lightheadedness/headache, d/c = discharge or diarrhea/constipation, f/c/n/v = fevers/chills/nausea/vomiting, cp/sob = chest pain/shortness of breath, tbv = total body volume, rxn = reaction, tdc = tunneled dialysis catheter, pta = prior to admission, hd = hemodialysis, pd = peritoneal dialysis, hhd = home hemodialysis, edw = estimated dry wt         Assessment/Plan:     1.  Oliguric LISA/CKD IIIa.  Pt ran today but was unable to pull fluid.  She states she is making more urine.  Her pre-cr was similar to yday's.  This constellation of findings suggests potential recovery.    A.  Strict I/Os.  B.  Follow labs, uo, sx closely over the weekend.  C.  Avoid nephrotoxics.    2.  Enterobacter pneumonia.  Pt is breathing ok.  She is afebrile and completed a course of abx.  A.  Follow clinically    3.  Anemia. Pt's hb is trending lower for the past couple of days.  Low dose heparin was used on dialysis due to clotting, but no obv signs of GI or other bleeding.    A.  EPO 10k qHD.  B.  Follow hb, clinically.    4.  FEN.  Na is better, but still on the low side.  Phos is elevated.  Eating better.  A.  Follow electrolytes.        Interval History:     Pt feels ok and states that today's run was better than the last one.  Of note, her BP dropped and the UF was stopped.  Breathing ok and she is eating/drinking fine.  She noticed increased uo.          Medications and Allergies:      - MEDICATION INSTRUCTIONS for Dialysis Patients -   Does not apply See Admin Instructions    artificial saliva  2 spray Swish &  "Spit 4x Daily    B and C vitamin Complex with folic acid  5 mL Oral or Feeding Tube Daily    diltiazem ER COATED BEADS  180 mg Oral Daily    sodium chloride (PF) 0.9%  10 mL Intracatheter Once in dialysis/CRRT    Followed by    heparin  1.3-2.6 mL Intracatheter Once in dialysis/CRRT    sodium chloride (PF) 0.9%  10 mL Intracatheter Once in dialysis/CRRT    Followed by    heparin  1.3-2.6 mL Intracatheter Once in dialysis/CRRT    heparin ANTICOAGULANT  5,000 Units Subcutaneous Q8H    hydrALAZINE  10 mg Oral or Feeding Tube Q8H    insulin aspart  1-6 Units Subcutaneous Q4H    levothyroxine  100 mcg Oral or NG Tube QAM AC    lipids plant base  250 mL Intravenous Q24H    magnesium oxide  400 mg Oral Daily    nystatin   Topical BID    pantoprazole  40 mg Oral or Feeding Tube QAM AC    Or    pantoprazole  40 mg Intravenous QAM AC    polyethylene glycol  17 g Oral or NG Tube Daily    senna-docusate  1 tablet Oral or NG Tube BID    sodium chloride (PF)  3 mL Intracatheter Q8H    sodium chloride (PF)  9 mL Intracatheter During Dialysis/CRRT (from stock)    sodium chloride (PF)  9 mL Intracatheter During Dialysis/CRRT (from stock)    sodium chloride 0.9%  250 mL Intravenous Once in dialysis/CRRT    sodium chloride 0.9%  300 mL Hemodialysis Machine Once    traZODone  25 mg Oral At Bedtime     Allergies   Allergen Reactions    Fentanyl Nausea and Vomiting     VERY sensitive to most narcotics if not all.    Morphine And Related Nausea          Physical Exam:     Vitals were reviewed     , Blood pressure 123/83, pulse 89, temperature 99  F (37.2  C), temperature source Oral, resp. rate 16, height 1.651 m (5' 5\"), weight 92.3 kg (203 lb 7.8 oz), SpO2 91%.  Wt Readings from Last 3 Encounters:   11/16/23 92.3 kg (203 lb 7.8 oz)   10/19/23 95 kg (209 lb 8 oz)   09/28/23 97.1 kg (214 lb)     Intake/Output Summary (Last 24 hours) at 11/17/2023 1626  Last data filed at 11/17/2023 1214  Gross per 24 hour   Intake 725 ml   Output 300 ml "   Net 425 ml     GENERAL APPEARANCE: pleasant, NAD, alert  RESP: CTA B c good efforts  CV: RRR, nl S1/S2   ABDOMEN: morbid obesity  EXTREMITIES/SKIN: no significant ble edema  ACCESS:  RIJ TDC    Pt seen on HD.  Net zero UF bc BP dropped.  Good BFR via RIJ TDC.         Data:     CBC RESULTS:     Recent Labs   Lab 11/17/23  0654 11/16/23  2010 11/15/23  0801 11/14/23  0614 11/13/23  0534 11/11/23  0939   WBC 5.6 6.5 7.1  --  8.6  --    RBC 2.69* 2.97* 3.16*  --  2.85*  --    HGB 7.6* 8.6* 9.1*  --  8.2*  --    HCT 25.0* 26.8* 29.7*  --  25.6*  --     158 211 129* 210 196     Basic Metabolic Panel:  Recent Labs   Lab 11/17/23  1209 11/17/23  0917 11/17/23  0654 11/17/23  0511 11/17/23  0213 11/16/23  2354 11/16/23  2127 11/16/23  2010 11/15/23  1301 11/15/23  0801 11/14/23  1644 11/14/23  1615 11/13/23  0745 11/13/23  0534 11/12/23  0825 11/12/23  0718   NA  --   --  134*  --   --   --   --  130*  --  133*  --  133*  --  130*  --  130*   POTASSIUM  --   --  4.4  --   --   --   --  4.6  --  4.8  --  4.5  --  4.4  --  4.1   CHLORIDE  --   --  99  --   --   --   --  95*  --  96*  --  96*  --  94*  --  93*   CO2  --   --  22  --   --   --   --  22  --  25  --  27  --  24  --  24   BUN  --   --  44.8*  --   --   --   --  40.9*  --  47.9*  --  40.0*  --  70.8*  --  60.8*   CR  --   --  4.64*  --   --   --   --  4.31*  --  4.62*  --  4.01*  --  5.94*  --  4.97*   * 114* 120* 120* 106* 125*   < > 152*   < > 120*   < > 114*   < > 110*   < > 105*   TERESA  --   --  8.5*  --   --   --   --  8.7*  --  8.8  --  8.2*  --  8.4*  --  8.4*    < > = values in this interval not displayed.     INRNo lab results found in last 7 days.   Attestation:   I have reviewed today's relevant vital signs, notes, medications, labs and imaging.    Wade Fish MD  [facility] - Nephrology  [phone]

## 2023-11-17 NOTE — PROGRESS NOTES
[facility]    Hospitalist Progress Note    Interval History   Patient continues to have daily improvement. She ate a good amount of lunch and breakfast. She denies pain. Her biggest complaint is weakness and MAYORGA with activity. ARU now assessing however she needs to be working with both PT and OT separately. They are aware. Discussed with SW today.    Assessment & Plan   Summary: Mya Hui is a 83 year old female with PMH Scoliosis, Lumbar radiculopathy, HTN, HLP, DM2, Hypothyroidism, CKD stage 3, Morbid obesity, GERD, Sliding hiatal hernia, Overactive bladder, OA, Chronic right shoulder pain/rotator cuff arthropathy, Migraines, Asymptomatic carotid artery stenosis who was admitted on 10/25/2023 with an elective T11 to pelvis robotic decompression and fusion.  Surgery was performed on 10/25/23 under general anesthesia.  Surgery was complicated with development of arrhythmia with significant hypotension and noted 3.5 L blood loss per Op Note and Anesthesia postprocedure evaluation note.  Patient received IV fluids, 6 units of pRBCs and 2 units of FFP intra-op.  ST changes were noted intraoperatively.  Patient remained intubated and started on Levophed and was transferred to the ICU due to hemorrhagic shock.  She required vasopressor support with Levophed until 10/27. Transferred out of the ICU with hospitalist comanging since 11/03    Post-op hemorrhagic shock, resolved  Shocked liver-resolved  Type 2 MI due to hemorrhagic shock, improved  Surgery was complicated with development of arrhythmia with significant hypotension and noted 3.5 L blood loss per Op Note and Anesthesia postprocedure evaluation note.  Patient received IV fluids, 6 units of pRBCs and 2 units of FFP intra-op. Patient received additional 2 units FFP, 10 units of cryo and 1 unit of PLT while in the ICU. LFTs continue to improve 11/5-11/7.    Encephalopathy, multifactorial, improving  Incidental finding of left basal  ganglia restricted diffusion  Patient was noted to be encephalopathic for which Vascular Neurology was consulted.  Brain MRI was completed and noted a left basal ganglia small focus of restricted diffusion but not a cause for encephalopathy and felt it was likely multifactorial.  EEG monitor was completed and negative and subsequently stopped.  Suspect due to uremic encephalopathy and sepsis.   Encephalopathy improved 11/6  Delirium prevention:   - Reorient patient at each interaction.  - Keep room lights on and blinds open during day (8am-8pm), low lights 8pm-8am.  - Minimize interruptions of sleep at night (consolidate vitals, nursing assessments, medications).  - Avoid sedating medications as able.    - Vascular Neurology recommended repeat brain MRI in 2-3 months.      LISA/ATN requiring hemodialysis  Anion gap metabolic acidosis due to above  CKD stage 3a  Hypoalbuminemia  Anasarca  Baseline creatinine between 1.1-1.4 with GFR in the 40-50's.   Due to development of LISA-D Nephrology was consulted and tunnel dialysis catheter placed 10/30 and has been undergoing HD Mon, Wed, Fri (last session was today, 11/3/2023).       - Continue dialysis, now on MWF schedule. Will need outpatient dialysis as we await recovery of renal function    Anemia  Hb stable ~8 since her initial hemorrhage. Lower today at 7.6 No bleeding noted. Nephrology planning to give EPO today  - monitor with intermittent labs    Aspiration/ventilator associated PNA (Enterobacter, stenotrophomonas and E. Coli)  Acute hypoxic respiratory failure resolved  Leukocytosis due to above  Extubated 11/2. Patient has also developed aspiration/ventilator associated pneumonia with sputum Cx growing Enterobacter, stenotrophomonas and E. coli and was initially started on Zoysn and switched to Meropenem on 11/1/2023.  Currently continued on Meropenem and awaiting final cultures. Procal 3. Sputum cultures growing Enterobacter, E coli, Stenotrophomonas.    -  "completed 14 days course of antibiotics 11/14  - Encourage use of IS/Flutter valve.      Dysphagia-improving  Moderate malnutrition  Failed SLP assessment. Bedside feeding tube placement coiling due to hiatal hernia. Started PPN     - improving swallow function. SLP does not recommend pursing PEG.   - continue modified diet per SLP with supplements  - ARU vs TCU on discharge for continue SLP rehab    Scoliosis  Lumbar radiculopathy  S/p T11 to pelvis robotic decompression and fusion  - Neurosurgery is managing.    HTN  NSVT  - continue diltiazem and hydralazine  - holding PTA lisinopril for ATN    HLP  - Hold PTA pravastatin 40 mg/d to reduce pill burden     Type 2 DM, controlled  Stress induced hyperglycemia  - no insulin needs recently, possible due to antibiotic use  - consider reduction in glucose monitoring    Hypothyroidism   - continue PTA synthroid     GERD  Sliding hiatal hernia  - PTA PPI    OA  Chronic right shoulder pain/rotator cuff arthropathy  Chronic Pain  - Pain/analgesic management per Neurosurgery.      Obesity   Suspected ANAYELI  Body mass index is 37.64 kg/m .  Increase in all-cause morbidity and mortality.   - Follow up with PCP regarding ongoing management.    - Monitor O2 saturations.    - Recommend outpatient sleep study.      Overactive bladder  - Holding PTA Ditropan XL 10 mg/d.      Migraines: Pain/analgesic management per Neurosurgery.      Asymptomatic carotid artery stenosis.    Hypomagnesemia   - RN replacement protocols    Clinically Significant Risk Factors              # Hypoalbuminemia: Lowest albumin = 2.5 g/dL at 11/1/2023  4:19 AM, will monitor as appropriate     # Hypertension: Noted on problem list        # Obesity: Estimated body mass index is 33.86 kg/m  as calculated from the following:    Height as of this encounter: 1.651 m (5' 5\").    Weight as of this encounter: 92.3 kg (203 lb 7.8 oz).   # Moderate Malnutrition: based on nutrition assessment      # " Financial/Environmental Concerns: none            Diet: Snacks/Supplements Adult: Gelatein Plus; With Meals  Combination Diet Minced and Moist Diet (level 5); Slightly Thick (level 1) (By chip)    DVT Prophylaxis: Heparin SQ  Spicer Catheter: Not present  Lines: PRESENT      CVC Double Lumen Right Internal jugular Non - valved (open ended);Tunneled-Site Assessment: WDL      Cardiac Monitoring: None  Code Status: Full Code    Disposition: ARU assessing    Nivia Lester DO  Hospitalist Service  [facility]  Securely message with [employer] (more info)  Text page via [employer] Paging/Directory     Data reviewed today: I reviewed all new labs and imaging results over the last 24 hours.     Medical Decision Making       35 MINUTES SPENT BY ME on the date of service doing chart review, history, exam, documentation & further activities per the note.       Physical Exam   Temp: 98.4  F (36.9  C) Temp src: Oral BP: (!) 145/87 Pulse: 75   Resp: 18 SpO2: 96 % O2 Device: None (Room air)    Vitals:    11/14/23 0903 11/15/23 0621 11/16/23 0204   Weight: 100 kg (220 lb 7.4 oz) 99 kg (218 lb 4.1 oz) 92.3 kg (203 lb 7.8 oz)     Vital Signs with Ranges  Temp:  [98  F (36.7  C)-98.8  F (37.1  C)] 98.4  F (36.9  C)  Pulse:  [75-84] 75  Resp:  [18-20] 18  BP: (127-145)/(66-87) 145/87  SpO2:  [92 %-96 %] 96 %  I/O last 3 completed shifts:  In: 225 [P.O.:225]  Out: 600 [Urine:600]  O2 requirements: no    Constitutional: Elderly female appears ill, generalized anasarca noted, TDC in R chest  HEENT: Eyes nonicteric, oral mucosa moist  Cardiovascular: RRR  Respiratory: no crackles noted today  Vascular: Trace BLE pitting edema  GI: Normoactive bowel sounds, appears nontender  Skin/Integumen: No rashes    Medications    dextrose      heparin (porcine)          - MEDICATION INSTRUCTIONS for Dialysis Patients -   Does not apply See Admin Instructions    alteplase  2 mg Intracatheter Once in dialysis/CRRT    alteplase  2  mg Intracatheter Once in dialysis/CRRT    artificial saliva  2 spray Swish & Spit 4x Daily    B and C vitamin Complex with folic acid  5 mL Oral or Feeding Tube Daily    diltiazem ER COATED BEADS  180 mg Oral Daily    epoetin stephie-epbx  10,000 Units Intravenous Once in dialysis/CRRT    heparin  500 Units Hemodialysis Machine OR IV Push Once in dialysis/CRRT    sodium chloride (PF) 0.9%  10 mL Intracatheter Once in dialysis/CRRT    Followed by    heparin  1.3-2.6 mL Intracatheter Once in dialysis/CRRT    sodium chloride (PF) 0.9%  10 mL Intracatheter Once in dialysis/CRRT    Followed by    heparin  1.3-2.6 mL Intracatheter Once in dialysis/CRRT    heparin ANTICOAGULANT  5,000 Units Subcutaneous Q8H    hydrALAZINE  10 mg Oral or Feeding Tube Q8H    insulin aspart  1-6 Units Subcutaneous Q4H    levothyroxine  100 mcg Oral or NG Tube QAM AC    lipids plant base  250 mL Intravenous Q24H    magnesium oxide  400 mg Oral Daily    nystatin   Topical BID    pantoprazole  40 mg Oral or Feeding Tube QAM AC    Or    pantoprazole  40 mg Intravenous QAM AC    polyethylene glycol  17 g Oral or NG Tube Daily    senna-docusate  1 tablet Oral or NG Tube BID    sodium chloride (PF)  3 mL Intracatheter Q8H    sodium chloride (PF)  9 mL Intracatheter During Dialysis/CRRT (from stock)    sodium chloride (PF)  9 mL Intracatheter During Dialysis/CRRT (from stock)    sodium chloride 0.9%  250 mL Intravenous Once in dialysis/CRRT    sodium chloride 0.9%  300 mL Hemodialysis Machine Once    traZODone  25 mg Oral At Bedtime       Data   Recent Labs   Lab 11/17/23  0654 11/17/23  0511 11/17/23  0213 11/16/23  2354 11/16/23  2127 11/16/23  2010 11/15/23  1301 11/15/23  0801 11/14/23  1644 11/14/23  1615 11/13/23  0745 11/13/23  0534 11/12/23  0825 11/12/23  0718   WBC 5.6  --   --   --   --  6.5  --  7.1  --   --   --  8.6   < >  --    HGB 7.6*  --   --   --   --  8.6*  --  9.1*  --   --   --  8.2*   < >  --    MCV 93  --   --   --   --  90  --   94  --   --   --  90   < >  --      --   --   --   --  158  --  211  --   --    < > 210  --   --    NA  --   --   --   --   --  130*  --  133*  --  133*  --  130*  --  130*   POTASSIUM  --   --   --   --   --  4.6  --  4.8  --  4.5  --  4.4  --  4.1   CHLORIDE  --   --   --   --   --  95*  --  96*  --  96*  --  94*  --  93*   CO2  --   --   --   --   --  22  --  25  --  27  --  24  --  24   BUN  --   --   --   --   --  40.9*  --  47.9*  --  40.0*  --  70.8*  --  60.8*   CR  --   --   --   --   --  4.31*  --  4.62*  --  4.01*  --  5.94*  --  4.97*   ANIONGAP  --   --   --   --   --  13  --  12  --  10  --  12  --  13   TERESA  --   --   --   --   --  8.7*  --  8.8  --  8.2*  --  8.4*  --  8.4*   GLC  --  120* 106* 125*   < > 152*   < > 120*   < > 114*   < > 110*   < > 105*   ALBUMIN  --   --   --   --   --  3.4*  --  3.1*  --  3.0*  --  2.7*  --  2.9*   PROTTOTAL  --   --   --   --   --   --   --   --   --   --   --  5.6*  --  5.8*   BILITOTAL  --   --   --   --   --   --   --   --   --   --   --  0.3  --  0.3   ALKPHOS  --   --   --   --   --   --   --   --   --   --   --  97  --  92   ALT  --   --   --   --   --   --   --   --   --   --   --  15  --  16   AST  --   --   --   --   --   --   --   --   --   --   --  27  --  24    < > = values in this interval not displayed.       Imaging:   No results found for this or any previous visit (from the past 24 hour(s)).

## 2023-11-18 ENCOUNTER — APPOINTMENT (OUTPATIENT)
Dept: OCCUPATIONAL THERAPY | Facility: CLINIC | Age: 83
DRG: 453 | End: 2023-11-18
Attending: NEUROLOGICAL SURGERY
Payer: COMMERCIAL

## 2023-11-18 ENCOUNTER — APPOINTMENT (OUTPATIENT)
Dept: PHYSICAL THERAPY | Facility: CLINIC | Age: 83
DRG: 453 | End: 2023-11-18
Attending: NEUROLOGICAL SURGERY
Payer: COMMERCIAL

## 2023-11-18 LAB
ALBUMIN SERPL BCG-MCNC: 3.2 G/DL (ref 3.5–5.2)
ANION GAP SERPL CALCULATED.3IONS-SCNC: 9 MMOL/L (ref 7–15)
BUN SERPL-MCNC: 23.9 MG/DL (ref 8–23)
CALCIUM SERPL-MCNC: 8.7 MG/DL (ref 8.8–10.2)
CHLORIDE SERPL-SCNC: 100 MMOL/L (ref 98–107)
CREAT SERPL-MCNC: 3.32 MG/DL (ref 0.51–0.95)
DEPRECATED HCO3 PLAS-SCNC: 29 MMOL/L (ref 22–29)
EGFRCR SERPLBLD CKD-EPI 2021: 13 ML/MIN/1.73M2
ERYTHROCYTE [DISTWIDTH] IN BLOOD BY AUTOMATED COUNT: 16.5 % (ref 10–15)
GLUCOSE BLDC GLUCOMTR-MCNC: 109 MG/DL (ref 70–99)
GLUCOSE BLDC GLUCOMTR-MCNC: 110 MG/DL (ref 70–99)
GLUCOSE BLDC GLUCOMTR-MCNC: 110 MG/DL (ref 70–99)
GLUCOSE BLDC GLUCOMTR-MCNC: 114 MG/DL (ref 70–99)
GLUCOSE BLDC GLUCOMTR-MCNC: 123 MG/DL (ref 70–99)
GLUCOSE BLDC GLUCOMTR-MCNC: 125 MG/DL (ref 70–99)
GLUCOSE BLDC GLUCOMTR-MCNC: 147 MG/DL (ref 70–99)
GLUCOSE SERPL-MCNC: 108 MG/DL (ref 70–99)
HCT VFR BLD AUTO: 26.9 % (ref 35–47)
HGB BLD-MCNC: 8.2 G/DL (ref 11.7–15.7)
MCH RBC QN AUTO: 28.5 PG (ref 26.5–33)
MCHC RBC AUTO-ENTMCNC: 30.5 G/DL (ref 31.5–36.5)
MCV RBC AUTO: 93 FL (ref 78–100)
PHOSPHATE SERPL-MCNC: 4.2 MG/DL (ref 2.5–4.5)
PLATELET # BLD AUTO: 212 10E3/UL (ref 150–450)
POTASSIUM SERPL-SCNC: 4.1 MMOL/L (ref 3.4–5.3)
RBC # BLD AUTO: 2.88 10E6/UL (ref 3.8–5.2)
SODIUM SERPL-SCNC: 138 MMOL/L (ref 135–145)
WBC # BLD AUTO: 6 10E3/UL (ref 4–11)

## 2023-11-18 PROCEDURE — 85027 COMPLETE CBC AUTOMATED: CPT | Performed by: INTERNAL MEDICINE

## 2023-11-18 PROCEDURE — 97530 THERAPEUTIC ACTIVITIES: CPT | Mod: GO

## 2023-11-18 PROCEDURE — 250N000013 HC RX MED GY IP 250 OP 250 PS 637: Performed by: HOSPITALIST

## 2023-11-18 PROCEDURE — 80069 RENAL FUNCTION PANEL: CPT | Performed by: INTERNAL MEDICINE

## 2023-11-18 PROCEDURE — C9113 INJ PANTOPRAZOLE SODIUM, VIA: HCPCS | Mod: JZ | Performed by: NEUROLOGICAL SURGERY

## 2023-11-18 PROCEDURE — 99233 SBSQ HOSP IP/OBS HIGH 50: CPT | Performed by: INTERNAL MEDICINE

## 2023-11-18 PROCEDURE — 97530 THERAPEUTIC ACTIVITIES: CPT | Mod: GP

## 2023-11-18 PROCEDURE — 97116 GAIT TRAINING THERAPY: CPT | Mod: GP

## 2023-11-18 PROCEDURE — 250N000011 HC RX IP 250 OP 636: Mod: JZ | Performed by: NURSE PRACTITIONER

## 2023-11-18 PROCEDURE — 250N000013 HC RX MED GY IP 250 OP 250 PS 637: Performed by: NURSE PRACTITIONER

## 2023-11-18 PROCEDURE — 250N000011 HC RX IP 250 OP 636: Mod: JZ | Performed by: NEUROLOGICAL SURGERY

## 2023-11-18 PROCEDURE — 250N000013 HC RX MED GY IP 250 OP 250 PS 637: Performed by: STUDENT IN AN ORGANIZED HEALTH CARE EDUCATION/TRAINING PROGRAM

## 2023-11-18 PROCEDURE — 120N000001 HC R&B MED SURG/OB

## 2023-11-18 PROCEDURE — 250N000011 HC RX IP 250 OP 636: Performed by: INTERNAL MEDICINE

## 2023-11-18 PROCEDURE — 250N000013 HC RX MED GY IP 250 OP 250 PS 637: Performed by: INTERNAL MEDICINE

## 2023-11-18 RX ADMIN — NYSTATIN: 100000 CREAM TOPICAL at 13:34

## 2023-11-18 RX ADMIN — ONDANSETRON 4 MG: 2 INJECTION INTRAMUSCULAR; INTRAVENOUS at 14:39

## 2023-11-18 RX ADMIN — PANTOPRAZOLE SODIUM 40 MG: 40 INJECTION, POWDER, FOR SOLUTION INTRAVENOUS at 05:48

## 2023-11-18 RX ADMIN — MICONAZOLE NITRATE: 2 POWDER TOPICAL at 21:10

## 2023-11-18 RX ADMIN — HYDRALAZINE HYDROCHLORIDE 10 MG: 10 TABLET ORAL at 13:32

## 2023-11-18 RX ADMIN — HEPARIN SODIUM 5000 UNITS: 5000 INJECTION, SOLUTION INTRAVENOUS; SUBCUTANEOUS at 00:43

## 2023-11-18 RX ADMIN — HYDRALAZINE HYDROCHLORIDE 10 MG: 10 TABLET ORAL at 05:54

## 2023-11-18 RX ADMIN — SENNOSIDES AND DOCUSATE SODIUM 1 TABLET: 8.6; 5 TABLET ORAL at 08:55

## 2023-11-18 RX ADMIN — Medication 400 MG: at 08:55

## 2023-11-18 RX ADMIN — TRAZODONE HYDROCHLORIDE 25 MG: 50 TABLET ORAL at 21:24

## 2023-11-18 RX ADMIN — DILTIAZEM HYDROCHLORIDE 180 MG: 180 CAPSULE, COATED, EXTENDED RELEASE ORAL at 08:55

## 2023-11-18 RX ADMIN — MICONAZOLE NITRATE: 2 POWDER TOPICAL at 13:34

## 2023-11-18 RX ADMIN — HEPARIN SODIUM 5000 UNITS: 5000 INJECTION, SOLUTION INTRAVENOUS; SUBCUTANEOUS at 08:55

## 2023-11-18 RX ADMIN — NYSTATIN: 100000 CREAM TOPICAL at 21:12

## 2023-11-18 RX ADMIN — POLYETHYLENE GLYCOL 3350 17 G: 17 POWDER, FOR SOLUTION ORAL at 08:56

## 2023-11-18 RX ADMIN — HEPARIN SODIUM 5000 UNITS: 5000 INJECTION, SOLUTION INTRAVENOUS; SUBCUTANEOUS at 16:30

## 2023-11-18 RX ADMIN — Medication 2 SPRAY: at 17:15

## 2023-11-18 RX ADMIN — Medication 2 SPRAY: at 13:35

## 2023-11-18 RX ADMIN — Medication 5 ML: at 16:30

## 2023-11-18 RX ADMIN — LEVOTHYROXINE SODIUM 100 MCG: 100 TABLET ORAL at 05:54

## 2023-11-18 RX ADMIN — Medication 2 SPRAY: at 08:56

## 2023-11-18 RX ADMIN — SENNOSIDES AND DOCUSATE SODIUM 1 TABLET: 8.6; 5 TABLET ORAL at 21:24

## 2023-11-18 ASSESSMENT — ACTIVITIES OF DAILY LIVING (ADL)
ADLS_ACUITY_SCORE: 40
ADLS_ACUITY_SCORE: 40
ADLS_ACUITY_SCORE: 35
ADLS_ACUITY_SCORE: 39
ADLS_ACUITY_SCORE: 40
ADLS_ACUITY_SCORE: 40
ADLS_ACUITY_SCORE: 37
ADLS_ACUITY_SCORE: 39
ADLS_ACUITY_SCORE: 40
ADLS_ACUITY_SCORE: 40

## 2023-11-18 NOTE — PROGRESS NOTES
Chart reviewed    Thoracic 11 to pelvis robotic decompression and fusion   -24 Days Post-Op     Chart reviewed no acute events overnight noted. SW coordinating placement.  Patient needing to work with therapies.  Recommend ARU at discharge.

## 2023-11-18 NOTE — PROGRESS NOTES
[facility]    Hospitalist Progress Note    Interval History   Up in the chair, has lunch in front of her on the tray but c/o nausea and upset stomach; no vomiting.  Denies chest pain, no SOB  Mood seems down, started crying.  Discussed with RN, the patient needed a lot of encouragement to work with therapies today    Assessment & Plan   Summary: Mya Hui is a 83 year old female with PMH Scoliosis, Lumbar radiculopathy, HTN, HLP, DM2, Hypothyroidism, CKD stage 3, Morbid obesity, GERD, Sliding hiatal hernia, Overactive bladder, OA, Chronic right shoulder pain/rotator cuff arthropathy, Migraines, Asymptomatic carotid artery stenosis who was admitted on 10/25/2023 with an elective T11 to pelvis robotic decompression and fusion.  Surgery was performed on 10/25/23 under general anesthesia.  Surgery was complicated with development of arrhythmia with significant hypotension and noted 3.5 L blood loss per Op Note and Anesthesia postprocedure evaluation note.  Patient received IV fluids, 6 units of pRBCs and 2 units of FFP intra-op.  ST changes were noted intraoperatively.  Patient remained intubated and started on Levophed and was transferred to the ICU due to hemorrhagic shock.  She required vasopressor support with Levophed until 10/27. Transferred out of the ICU with hospitalist comanging since 11/03    Post-op hemorrhagic shock, resolved  Shocked liver-resolved  Type 2 MI due to hemorrhagic shock, improved  Surgery was complicated with development of arrhythmia with significant hypotension and noted 3.5 L blood loss per Op Note and Anesthesia postprocedure evaluation note.  Patient received IV fluids, 6 units of pRBCs and 2 units of FFP intra-op. Patient received additional 2 units FFP, 10 units of cryo and 1 unit of PLT while in the ICU. LFTs continue to improve 11/5-11/7.    Encephalopathy, multifactorial, improving  Incidental finding of left basal ganglia restricted  diffusion  Patient was noted to be encephalopathic for which Vascular Neurology was consulted.  Brain MRI was completed and noted a left basal ganglia small focus of restricted diffusion but not a cause for encephalopathy and felt it was likely multifactorial.  EEG monitor was completed and negative and subsequently stopped.  Suspect due to uremic encephalopathy and sepsis.   Encephalopathy improved 11/6  Delirium prevention:   - Reorient patient at each interaction.  - Keep room lights on and blinds open during day (8am-8pm), low lights 8pm-8am.  - Minimize interruptions of sleep at night (consolidate vitals, nursing assessments, medications).  - Avoid sedating medications as able.    - Vascular Neurology recommended repeat brain MRI in 2-3 months.      LISA/ATN requiring hemodialysis  Anion gap metabolic acidosis due to above  CKD stage 3a  Hypoalbuminemia  Anasarca  Baseline creatinine between 1.1-1.4 with GFR in the 40-50's.   Due to development of LISA-D Nephrology was consulted and tunnel dialysis catheter placed 10/30 and has been undergoing HD Mon, Wed, Fri (last session was today, 11/3/2023).       - Continue dialysis, now on MWF schedule. Will need outpatient dialysis as we await recovery of renal function    Anemia  Hb stable ~8 since her initial hemorrhage. Lower today at 7.6 No bleeding noted. Nephrology planning to give EPO today  - monitor with intermittent labs    Aspiration/ventilator associated PNA (Enterobacter, stenotrophomonas and E. Coli)  Acute hypoxic respiratory failure resolved  Leukocytosis due to above  Extubated 11/2. Patient has also developed aspiration/ventilator associated pneumonia with sputum Cx growing Enterobacter, stenotrophomonas and E. coli and was initially started on Zoysn and switched to Meropenem on 11/1/2023.  Currently continued on Meropenem and awaiting final cultures. Procal 3. Sputum cultures growing Enterobacter, E coli, Stenotrophomonas.    - completed 14 days course  "of antibiotics 11/14  - Encourage use of IS/Flutter valve.      Dysphagia-improving  Moderate malnutrition  Failed SLP assessment. Bedside feeding tube placement coiling due to hiatal hernia. Started PPN     - improving swallow function. SLP does not recommend pursing PEG.   - continue modified diet per SLP with supplements; currently on DD 5 with slightly thick liquids  - ARU vs TCU on discharge for continue SLP rehab    Scoliosis  Lumbar radiculopathy  S/p T11 to pelvis robotic decompression and fusion  - Neurosurgery is managing.    HTN  NSVT  - continue diltiazem  mg po daily and hydralazine 10 mg po q8h   - holding PTA lisinopril for ATN    HLP  - Hold PTA pravastatin 40 mg/d to reduce pill burden     Type 2 DM, controlled  Stress induced hyperglycemia  - no insulin needs recently, possible due to antibiotic use  - consider reduction in glucose monitoring    Hypothyroidism   - continue PTA synthroid     GERD  Sliding hiatal hernia  - PTA PPI    OA  Chronic right shoulder pain/rotator cuff arthropathy  Chronic Pain  - Pain/analgesic management per Neurosurgery.      Obesity   Suspected ANAYELI  Body mass index is 37.64 kg/m .  Increase in all-cause morbidity and mortality.   - Follow up with PCP regarding ongoing management.    - Monitor O2 saturations.    - Recommend outpatient sleep study.      Overactive bladder  - Holding PTA Ditropan XL 10 mg/d.      Migraines: Pain/analgesic management per Neurosurgery.      Asymptomatic carotid artery stenosis.    Hypomagnesemia   - RN replacement protocols    Clinically Significant Risk Factors              # Hypoalbuminemia: Lowest albumin = 2.5 g/dL at 11/1/2023  4:19 AM, will monitor as appropriate     # Hypertension: Noted on problem list        # Obesity: Estimated body mass index is 34.85 kg/m  as calculated from the following:    Height as of this encounter: 1.651 m (5' 5\").    Weight as of this encounter: 95 kg (209 lb 7 oz).   # Moderate Malnutrition: based on " nutrition assessment      # Financial/Environmental Concerns: none            Diet: Snacks/Supplements Adult: Gelatein Plus; With Meals  Combination Diet Minced and Moist Diet (level 5); Slightly Thick (level 1) (By teaspoon)    DVT Prophylaxis: Heparin SQ  Spicer Catheter: Not present  Lines: PRESENT      CVC Double Lumen Right Internal jugular Non - valved (open ended);Tunneled-Site Assessment: WDL      Cardiac Monitoring: None  Code Status: Full Code    Disposition: ARU assessing    Roxanna Xiao MD  Hospitalist Service  [facility]  Securely message with [employer] (more info)  Text page via [employer] Paging/Directory     Data reviewed today: I reviewed all new labs and imaging results over the last 24 hours.     Medical Decision Making       45 MINUTES SPENT BY ME on the date of service doing chart review, history, exam, documentation & further activities per the note.       Physical Exam   Temp: 98.8  F (37.1  C) Temp src: Oral BP: 121/67 Pulse: 83   Resp: 16 SpO2: 93 % O2 Device: None (Room air)    Vitals:    11/15/23 0621 11/16/23 0204 11/18/23 0500   Weight: 99 kg (218 lb 4.1 oz) 92.3 kg (203 lb 7.8 oz) 95 kg (209 lb 7 oz)     Vital Signs with Ranges  Temp:  [98.8  F (37.1  C)-99  F (37.2  C)] 98.8  F (37.1  C)  Pulse:  [83-89] 83  Resp:  [16] 16  BP: (121-133)/(65-83) 121/67  SpO2:  [91 %-93 %] 93 %  I/O last 3 completed shifts:  In: 890 [P.O.:790; Other:100]  Out: 0   O2 requirements: no    Constitutional: Elderly female, up in the chair  HEENT: Eyes nonicteric, oral mucosa moist  Cardiovascular: S1S2, RRR  Respiratory: bilateral air entry, no wheezing, no rales, no crackles  Vascular: Trace BLE pitting edema  GI: Normoactive bowel sounds, appears nontender  Skin/Integumen: No rashes  Neuro:AAOX3, no FNDs  Psych- mood down    Medications    dextrose          - MEDICATION INSTRUCTIONS for Dialysis Patients -   Does not apply See Admin Instructions    artificial saliva  2 spray Swish  & Spit 4x Daily    B and C vitamin Complex with folic acid  5 mL Oral or Feeding Tube Daily    diltiazem ER COATED BEADS  180 mg Oral Daily    heparin ANTICOAGULANT  5,000 Units Subcutaneous Q8H    hydrALAZINE  10 mg Oral or Feeding Tube Q8H    insulin aspart  1-6 Units Subcutaneous Q4H    levothyroxine  100 mcg Oral or NG Tube QAM AC    lipids plant base  250 mL Intravenous Q24H    magnesium oxide  400 mg Oral Daily    nystatin   Topical BID    pantoprazole  40 mg Oral or Feeding Tube QAM AC    Or    pantoprazole  40 mg Intravenous QAM AC    polyethylene glycol  17 g Oral or NG Tube Daily    senna-docusate  1 tablet Oral or NG Tube BID    sodium chloride (PF)  3 mL Intracatheter Q8H    sodium chloride (PF)  9 mL Intracatheter During Dialysis/CRRT (from stock)    sodium chloride (PF) 0.9%  10 mL Intracatheter Once in dialysis/CRRT    traZODone  25 mg Oral At Bedtime       Data   Recent Labs   Lab 11/18/23  1338 11/18/23  0853 11/18/23  0600 11/17/23  0917 11/17/23  0654 11/16/23 2127 11/16/23 2010 11/13/23  0745 11/13/23  0534 11/12/23  0825 11/12/23  0718   WBC  --   --  6.0  --  5.6  --  6.5   < > 8.6  --   --    HGB  --   --  8.2*  --  7.6*  --  8.6*   < > 8.2*  --   --    MCV  --   --  93  --  93  --  90   < > 90  --   --    PLT  --   --  212  --  156  --  158   < > 210  --   --    NA  --   --  138  --  134*  --  130*   < > 130*  --  130*   POTASSIUM  --   --  4.1  --  4.4  --  4.6   < > 4.4  --  4.1   CHLORIDE  --   --  100  --  99  --  95*   < > 94*  --  93*   CO2  --   --  29  --  22  --  22   < > 24  --  24   BUN  --   --  23.9*  --  44.8*  --  40.9*   < > 70.8*  --  60.8*   CR  --   --  3.32*  --  4.64*  --  4.31*   < > 5.94*  --  4.97*   ANIONGAP  --   --  9  --  13  --  13   < > 12  --  13   TERESA  --   --  8.7*  --  8.5*  --  8.7*   < > 8.4*  --  8.4*   * 109* 108*   < > 120*   < > 152*   < > 110*   < > 105*   ALBUMIN  --   --  3.2*  --  3.1*  --  3.4*   < > 2.7*  --  2.9*   PROTTOTAL  --   --    --   --   --   --   --   --  5.6*  --  5.8*   BILITOTAL  --   --   --   --   --   --   --   --  0.3  --  0.3   ALKPHOS  --   --   --   --   --   --   --   --  97  --  92   ALT  --   --   --   --   --   --   --   --  15  --  16   AST  --   --   --   --   --   --   --   --  27  --  24    < > = values in this interval not displayed.       Imaging:   No results found for this or any previous visit (from the past 24 hour(s)).

## 2023-11-18 NOTE — PLAN OF CARE
Goal Outcome Evaluation:         Goal Outcome Evaluation:      Cognitive Concerns/ Orientation : Aox4, forgetful, and hard of hearing  BEHAVIOR & AGGRESSION TOOL COLOR: Green   ABNL VS/O2: VSS on RA   MOBILITY: not out of bed this shift, but per previous shift pt will get up w/ assist of 2  PAIN MANAGMENT: Denies pain  DIET: Tolerates Minced & Moist Diet   BOWEL/BLADDER: Incontinent of bladder, voiding via purewick  ABNL LAB/BG: Refused 2 am blood sugar check   DRAIN/DEVICES: Port on rt chest, Midline in left upper extremity   SKIN: scattered bruising   TESTS/PROCEDURES: Renal panel and CBC collected    OTHER IMPORTANT INFO: Lipids running at 20.8 ml/hr. Denies pain. Dressing on back steri strips CDI.  Strict I/Os. Receiving heparin shots. Turn and reposition pt.

## 2023-11-19 ENCOUNTER — APPOINTMENT (OUTPATIENT)
Dept: PHYSICAL THERAPY | Facility: CLINIC | Age: 83
DRG: 453 | End: 2023-11-19
Attending: NEUROLOGICAL SURGERY
Payer: COMMERCIAL

## 2023-11-19 ENCOUNTER — APPOINTMENT (OUTPATIENT)
Dept: OCCUPATIONAL THERAPY | Facility: CLINIC | Age: 83
DRG: 453 | End: 2023-11-19
Attending: NEUROLOGICAL SURGERY
Payer: COMMERCIAL

## 2023-11-19 LAB
ALBUMIN SERPL BCG-MCNC: 3.2 G/DL (ref 3.5–5.2)
ANION GAP SERPL CALCULATED.3IONS-SCNC: 11 MMOL/L (ref 7–15)
BUN SERPL-MCNC: 30.2 MG/DL (ref 8–23)
CALCIUM SERPL-MCNC: 9 MG/DL (ref 8.8–10.2)
CHLORIDE SERPL-SCNC: 100 MMOL/L (ref 98–107)
CREAT SERPL-MCNC: 4.22 MG/DL (ref 0.51–0.95)
DEPRECATED HCO3 PLAS-SCNC: 27 MMOL/L (ref 22–29)
EGFRCR SERPLBLD CKD-EPI 2021: 10 ML/MIN/1.73M2
ERYTHROCYTE [DISTWIDTH] IN BLOOD BY AUTOMATED COUNT: 16.5 % (ref 10–15)
GLUCOSE BLDC GLUCOMTR-MCNC: 117 MG/DL (ref 70–99)
GLUCOSE BLDC GLUCOMTR-MCNC: 122 MG/DL (ref 70–99)
GLUCOSE BLDC GLUCOMTR-MCNC: 124 MG/DL (ref 70–99)
GLUCOSE BLDC GLUCOMTR-MCNC: 137 MG/DL (ref 70–99)
GLUCOSE BLDC GLUCOMTR-MCNC: 149 MG/DL (ref 70–99)
GLUCOSE SERPL-MCNC: 116 MG/DL (ref 70–99)
GLUCOSE SERPL-MCNC: 116 MG/DL (ref 70–99)
HCT VFR BLD AUTO: 28.9 % (ref 35–47)
HGB BLD-MCNC: 8.7 G/DL (ref 11.7–15.7)
MCH RBC QN AUTO: 28.5 PG (ref 26.5–33)
MCHC RBC AUTO-ENTMCNC: 30.1 G/DL (ref 31.5–36.5)
MCV RBC AUTO: 95 FL (ref 78–100)
PHOSPHATE SERPL-MCNC: 4.8 MG/DL (ref 2.5–4.5)
PLATELET # BLD AUTO: 222 10E3/UL (ref 150–450)
POTASSIUM SERPL-SCNC: 3.9 MMOL/L (ref 3.4–5.3)
RBC # BLD AUTO: 3.05 10E6/UL (ref 3.8–5.2)
SODIUM SERPL-SCNC: 138 MMOL/L (ref 135–145)
WBC # BLD AUTO: 6.3 10E3/UL (ref 4–11)

## 2023-11-19 PROCEDURE — 99232 SBSQ HOSP IP/OBS MODERATE 35: CPT | Performed by: INTERNAL MEDICINE

## 2023-11-19 PROCEDURE — 97530 THERAPEUTIC ACTIVITIES: CPT | Mod: GP

## 2023-11-19 PROCEDURE — 250N000013 HC RX MED GY IP 250 OP 250 PS 637: Performed by: STUDENT IN AN ORGANIZED HEALTH CARE EDUCATION/TRAINING PROGRAM

## 2023-11-19 PROCEDURE — 120N000001 HC R&B MED SURG/OB

## 2023-11-19 PROCEDURE — 85018 HEMOGLOBIN: CPT | Performed by: INTERNAL MEDICINE

## 2023-11-19 PROCEDURE — 97116 GAIT TRAINING THERAPY: CPT | Mod: GP

## 2023-11-19 PROCEDURE — 250N000013 HC RX MED GY IP 250 OP 250 PS 637: Performed by: INTERNAL MEDICINE

## 2023-11-19 PROCEDURE — C9113 INJ PANTOPRAZOLE SODIUM, VIA: HCPCS | Mod: JZ | Performed by: NEUROLOGICAL SURGERY

## 2023-11-19 PROCEDURE — 99233 SBSQ HOSP IP/OBS HIGH 50: CPT | Performed by: INTERNAL MEDICINE

## 2023-11-19 PROCEDURE — 250N000011 HC RX IP 250 OP 636: Mod: JZ | Performed by: NEUROLOGICAL SURGERY

## 2023-11-19 PROCEDURE — 250N000011 HC RX IP 250 OP 636: Performed by: INTERNAL MEDICINE

## 2023-11-19 PROCEDURE — 80069 RENAL FUNCTION PANEL: CPT | Performed by: INTERNAL MEDICINE

## 2023-11-19 PROCEDURE — 84478 ASSAY OF TRIGLYCERIDES: CPT | Performed by: HOSPITALIST

## 2023-11-19 PROCEDURE — 250N000013 HC RX MED GY IP 250 OP 250 PS 637: Performed by: HOSPITALIST

## 2023-11-19 PROCEDURE — 97535 SELF CARE MNGMENT TRAINING: CPT | Mod: GO

## 2023-11-19 PROCEDURE — 250N000013 HC RX MED GY IP 250 OP 250 PS 637: Performed by: NURSE PRACTITIONER

## 2023-11-19 PROCEDURE — 36415 COLL VENOUS BLD VENIPUNCTURE: CPT | Performed by: HOSPITALIST

## 2023-11-19 PROCEDURE — 99207 PR CDG-CUT & PASTE-POTENTIAL IMPACT ON LEVEL: CPT | Performed by: INTERNAL MEDICINE

## 2023-11-19 PROCEDURE — 36415 COLL VENOUS BLD VENIPUNCTURE: CPT | Performed by: INTERNAL MEDICINE

## 2023-11-19 RX ADMIN — PANTOPRAZOLE SODIUM 40 MG: 40 INJECTION, POWDER, FOR SOLUTION INTRAVENOUS at 05:27

## 2023-11-19 RX ADMIN — Medication 2 SPRAY: at 17:25

## 2023-11-19 RX ADMIN — Medication 2 SPRAY: at 21:04

## 2023-11-19 RX ADMIN — NYSTATIN: 100000 CREAM TOPICAL at 21:05

## 2023-11-19 RX ADMIN — MICONAZOLE NITRATE: 2 POWDER TOPICAL at 12:11

## 2023-11-19 RX ADMIN — SENNOSIDES AND DOCUSATE SODIUM 1 TABLET: 8.6; 5 TABLET ORAL at 21:03

## 2023-11-19 RX ADMIN — HEPARIN SODIUM 5000 UNITS: 5000 INJECTION, SOLUTION INTRAVENOUS; SUBCUTANEOUS at 08:47

## 2023-11-19 RX ADMIN — Medication 2 SPRAY: at 12:13

## 2023-11-19 RX ADMIN — HEPARIN SODIUM 5000 UNITS: 5000 INJECTION, SOLUTION INTRAVENOUS; SUBCUTANEOUS at 23:19

## 2023-11-19 RX ADMIN — HYDRALAZINE HYDROCHLORIDE 10 MG: 10 TABLET ORAL at 21:07

## 2023-11-19 RX ADMIN — TRAZODONE HYDROCHLORIDE 25 MG: 50 TABLET ORAL at 21:03

## 2023-11-19 RX ADMIN — LEVOTHYROXINE SODIUM 100 MCG: 100 TABLET ORAL at 05:24

## 2023-11-19 RX ADMIN — Medication 2 SPRAY: at 09:08

## 2023-11-19 RX ADMIN — HYDRALAZINE HYDROCHLORIDE 10 MG: 10 TABLET ORAL at 14:40

## 2023-11-19 RX ADMIN — POLYETHYLENE GLYCOL 3350 17 G: 17 POWDER, FOR SOLUTION ORAL at 08:47

## 2023-11-19 RX ADMIN — HEPARIN SODIUM 5000 UNITS: 5000 INJECTION, SOLUTION INTRAVENOUS; SUBCUTANEOUS at 16:28

## 2023-11-19 RX ADMIN — SENNOSIDES AND DOCUSATE SODIUM 1 TABLET: 8.6; 5 TABLET ORAL at 08:47

## 2023-11-19 RX ADMIN — HYDRALAZINE HYDROCHLORIDE 10 MG: 10 TABLET ORAL at 05:24

## 2023-11-19 RX ADMIN — DILTIAZEM HYDROCHLORIDE 180 MG: 180 CAPSULE, COATED, EXTENDED RELEASE ORAL at 08:48

## 2023-11-19 RX ADMIN — Medication 5 ML: at 17:24

## 2023-11-19 RX ADMIN — Medication 400 MG: at 08:47

## 2023-11-19 RX ADMIN — HEPARIN SODIUM 5000 UNITS: 5000 INJECTION, SOLUTION INTRAVENOUS; SUBCUTANEOUS at 00:34

## 2023-11-19 ASSESSMENT — ACTIVITIES OF DAILY LIVING (ADL)
ADLS_ACUITY_SCORE: 44
ADLS_ACUITY_SCORE: 37
ADLS_ACUITY_SCORE: 44
ADLS_ACUITY_SCORE: 37
ADLS_ACUITY_SCORE: 40
ADLS_ACUITY_SCORE: 37
ADLS_ACUITY_SCORE: 44
ADLS_ACUITY_SCORE: 37

## 2023-11-19 NOTE — PROGRESS NOTES
[facility]    Hospitalist Progress Note    Interval History   Feeling better, up in the chair, ate 50% of her breakfast and lunch   No N/V, no abd pain  Hab BM in am  Denies chest pain, no SOB  Has urinary incontinence  Needed to be straight cath in am  Discussed with RN and Nephrology.    Assessment & Plan   Summary: Mya Hui is a 83 year old female with PMH Scoliosis, Lumbar radiculopathy, HTN, HLP, DM2, Hypothyroidism, CKD stage 3, Morbid obesity, GERD, Sliding hiatal hernia, Overactive bladder, OA, Chronic right shoulder pain/rotator cuff arthropathy, Migraines, Asymptomatic carotid artery stenosis who was admitted on 10/25/2023 with an elective T11 to pelvis robotic decompression and fusion.  Surgery was performed on 10/25/23 under general anesthesia.  Surgery was complicated with development of arrhythmia with significant hypotension and noted 3.5 L blood loss per Op Note and Anesthesia postprocedure evaluation note.  Patient received IV fluids, 6 units of pRBCs and 2 units of FFP intra-op.  ST changes were noted intraoperatively.  Patient remained intubated and started on Levophed and was transferred to the ICU due to hemorrhagic shock.  She required vasopressor support with Levophed until 10/27. Transferred out of the ICU with hospitalist comanging since 11/03    Post-op hemorrhagic shock, resolved  Shocked liver-resolved  Type 2 MI due to hemorrhagic shock, improved  Surgery was complicated with development of arrhythmia with significant hypotension and noted 3.5 L blood loss per Op Note and Anesthesia postprocedure evaluation note.  Patient received IV fluids, 6 units of pRBCs and 2 units of FFP intra-op. Patient received additional 2 units FFP, 10 units of cryo and 1 unit of PLT while in the ICU. LFTs continue to improve 11/5-11/7.    Encephalopathy, multifactorial, improving  Incidental finding of left basal ganglia restricted diffusion  Patient was noted to be  encephalopathic for which Vascular Neurology was consulted.  Brain MRI was completed and noted a left basal ganglia small focus of restricted diffusion but not a cause for encephalopathy and felt it was likely multifactorial.  EEG monitor was completed and negative and subsequently stopped.  Suspect due to uremic encephalopathy and sepsis.   Encephalopathy improved 11/6  Delirium prevention:   - Reorient patient at each interaction.  - Keep room lights on and blinds open during day (8am-8pm), low lights 8pm-8am.  - Minimize interruptions of sleep at night (consolidate vitals, nursing assessments, medications).  - Avoid sedating medications as able.    - Vascular Neurology recommended repeat brain MRI in 2-3 months.      LISA/ATN requiring hemodialysis  Anion gap metabolic acidosis due to above  CKD stage 3a  Hypoalbuminemia  Anasarca  Baseline creatinine between 1.1-1.4 with GFR in the 40-50's.   Due to development of LISA-D Nephrology was consulted and tunnel dialysis catheter placed 10/30 and has been undergoing HD Mon, Wed, Fri (last session was today, 11/3/2023).       - Continue dialysis, now on MWF schedule. Will need outpatient dialysis as we await recovery of renal function    Anemia  Hb stable ~8 since her initial hemorrhage. 11/17- Hb lower at 7.6 No bleeding noted. Nephrology planning to give EPO   - Hb now  8.2--8.7  - monitor with intermittent labs    Aspiration/ventilator associated PNA (Enterobacter, stenotrophomonas and E. Coli)  Acute hypoxic respiratory failure resolved  Leukocytosis due to above  Extubated 11/2. Patient has also developed aspiration/ventilator associated pneumonia with sputum Cx growing Enterobacter, stenotrophomonas and E. coli and was initially started on Zoysn and switched to Meropenem on 11/1/2023.  Currently continued on Meropenem and awaiting final cultures. Procal 3. Sputum cultures growing Enterobacter, E coli, Stenotrophomonas.    - completed 14 days course of antibiotics  "11/14  - Encourage use of IS/Flutter valve.      Dysphagia-improving  Moderate malnutrition  Failed SLP assessment. Bedside feeding tube placement coiling due to hiatal hernia. Started PPN     - improving swallow function. SLP does not recommend pursing PEG.   - continue modified diet per SLP with supplements; currently on DD 5 with slightly thick liquids  - ARU vs TCU on discharge for continue SLP rehab    Scoliosis  Lumbar radiculopathy  S/p T11 to pelvis robotic decompression and fusion  - Neurosurgery is managing.    HTN  NSVT  - continue diltiazem  mg po daily and hydralazine 10 mg po q8h   - holding PTA lisinopril for ATN    HLP  - Hold PTA pravastatin 40 mg/d to reduce pill burden     Type 2 DM, controlled  Stress induced hyperglycemia  - no insulin needs recently, possible due to antibiotic use  - consider reduction in glucose monitoring    Hypothyroidism   - continue PTA synthroid     GERD  Sliding hiatal hernia  - PTA PPI    OA  Chronic right shoulder pain/rotator cuff arthropathy  Chronic Pain  - Pain/analgesic management per Neurosurgery.      Obesity   Suspected ANAYELI  Body mass index is 37.64 kg/m .  Increase in all-cause morbidity and mortality.   - Follow up with PCP regarding ongoing management.    - Monitor O2 saturations.    - Recommend outpatient sleep study.      Overactive bladder  - Holding PTA Ditropan XL 10 mg/d.      Migraines: Pain/analgesic management per Neurosurgery.      Asymptomatic carotid artery stenosis.    Hypomagnesemia   - RN replacement protocols    Clinically Significant Risk Factors              # Hypoalbuminemia: Lowest albumin = 2.5 g/dL at 11/1/2023  4:19 AM, will monitor as appropriate     # Hypertension: Noted on problem list        # Obesity: Estimated body mass index is 35.22 kg/m  as calculated from the following:    Height as of this encounter: 1.651 m (5' 5\").    Weight as of this encounter: 96 kg (211 lb 10.3 oz).   # Moderate Malnutrition: based on nutrition " assessment      # Financial/Environmental Concerns: none            Diet: Snacks/Supplements Adult: Gelatein Plus; With Meals  Combination Diet Minced and Moist Diet (level 5); Slightly Thick (level 1) (By teaparesh)    DVT Prophylaxis: Heparin SQ  Spicer Catheter: Not present  Lines: PRESENT      CVC Double Lumen Right Internal jugular Non - valved (open ended);Tunneled-Site Assessment: WDL      Cardiac Monitoring: None  Code Status: Full Code    Disposition: ARU assessing    Roxanna Xiao MD  Hospitalist Service  [facility]  Securely message with [employer] (more info)  Text page via [employer] Paging/Directory     Data reviewed today: I reviewed all new labs and imaging results over the last 24 hours.     Medical Decision Making       40 MINUTES SPENT BY ME on the date of service doing chart review, history, exam, documentation & further activities per the note.       Physical Exam   Temp: 98.7  F (37.1  C) Temp src: Oral BP: 122/73 Pulse: 79   Resp: 18 SpO2: 97 % O2 Device: None (Room air) Oxygen Delivery: 1 LPM  Vitals:    11/16/23 0204 11/18/23 0500 11/19/23 0500   Weight: 92.3 kg (203 lb 7.8 oz) 95 kg (209 lb 7 oz) 96 kg (211 lb 10.3 oz)     Vital Signs with Ranges  Temp:  [97.6  F (36.4  C)-98.7  F (37.1  C)] 98.7  F (37.1  C)  Pulse:  [73-85] 79  Resp:  [16-18] 18  BP: (105-144)/(58-94) 122/73  SpO2:  [82 %-97 %] 97 %  I/O last 3 completed shifts:  In: 470 [P.O.:470]  Out: 945 [Urine:945]  O2 requirements: no    Constitutional: Elderly female, up in the chair  HEENT: Eyes nonicteric, oral mucosa moist  Cardiovascular: S1S2, RRR  Respiratory: bilateral air entry, no wheezing, no rales, no crackles  Vascular: Trace BLE pitting edema  GI: Normoactive bowel sounds, appears nontender  Skin/Integumen: No rashes  Neuro:AAOX3, no FNDs  Psych- normal mood    Medications    dextrose          - MEDICATION INSTRUCTIONS for Dialysis Patients -   Does not apply See Admin Instructions    artificial  saliva  2 spray Swish & Spit 4x Daily    B and C vitamin Complex with folic acid  5 mL Oral or Feeding Tube Daily    diltiazem ER COATED BEADS  180 mg Oral Daily    heparin ANTICOAGULANT  5,000 Units Subcutaneous Q8H    hydrALAZINE  10 mg Oral or Feeding Tube Q8H    levothyroxine  100 mcg Oral or NG Tube QAM AC    magnesium oxide  400 mg Oral Daily    nystatin   Topical BID    pantoprazole  40 mg Oral or Feeding Tube QAM AC    Or    pantoprazole  40 mg Intravenous QAM AC    polyethylene glycol  17 g Oral or NG Tube Daily    senna-docusate  1 tablet Oral or NG Tube BID    sodium chloride (PF)  3 mL Intracatheter Q8H    sodium chloride (PF)  9 mL Intracatheter During Dialysis/CRRT (from stock)    sodium chloride (PF) 0.9%  10 mL Intracatheter Once in dialysis/CRRT    traZODone  25 mg Oral At Bedtime       Data   Recent Labs   Lab 11/19/23  1229 11/19/23  0839 11/19/23  0653 11/18/23  0853 11/18/23  0600 11/17/23  0917 11/17/23  0654 11/13/23  0745 11/13/23  0534   WBC  --   --  6.3  --  6.0  --  5.6   < > 8.6   HGB  --   --  8.7*  --  8.2*  --  7.6*   < > 8.2*   MCV  --   --  95  --  93  --  93   < > 90   PLT  --   --  222  --  212  --  156   < > 210   NA  --   --  138  --  138  --  134*   < > 130*   POTASSIUM  --   --  3.9  --  4.1  --  4.4   < > 4.4   CHLORIDE  --   --  100  --  100  --  99   < > 94*   CO2  --   --  27  --  29  --  22   < > 24   BUN  --   --  30.2*  --  23.9*  --  44.8*   < > 70.8*   CR  --   --  4.22*  --  3.32*  --  4.64*   < > 5.94*   ANIONGAP  --   --  11  --  9  --  13   < > 12   TERESA  --   --  9.0  --  8.7*  --  8.5*   < > 8.4*   * 117* 116*  116*   < > 108*   < > 120*   < > 110*   ALBUMIN  --   --  3.2*  --  3.2*  --  3.1*   < > 2.7*   PROTTOTAL  --   --   --   --   --   --   --   --  5.6*   BILITOTAL  --   --   --   --   --   --   --   --  0.3   ALKPHOS  --   --   --   --   --   --   --   --  97   ALT  --   --   --   --   --   --   --   --  15   AST  --   --   --   --   --   --   --    --  27    < > = values in this interval not displayed.       Imaging:   No results found for this or any previous visit (from the past 24 hour(s)).

## 2023-11-19 NOTE — PROGRESS NOTES
[facility]     Renal Progress Note       SHORTHAND KEY FOR MY NOTES:  c = with, s = without, p = after, a = before, x = except, asx = asymptomatic, tx = transplant or treatment, sx = symptoms or symptomatic, cx = canceled or culture, rxn = reaction, yday = yesterday, nl = normal, abx = antibiotics, fxn = function, dx = diagnosis, dz = disease, m/h = melena/hematochezia, c/d/l/ha = cramping/dizziness/lightheadedness/headache, d/c = discharge or diarrhea/constipation, f/c/n/v = fevers/chills/nausea/vomiting, cp/sob = chest pain/shortness of breath, tbv = total body volume, rxn = reaction, tdc = tunneled dialysis catheter, pta = prior to admission, hd = hemodialysis, pd = peritoneal dialysis, hhd = home hemodialysis, edw = estimated dry wt         Assessment/Plan:     1.  LISA/CKD IIIa.  Pt's cr marian from yday to today, which is not surprising bc of HD on Friday.  We will see how much she rises tmrw and then determine if she will need to run.  TBV is ok and she is urinating more.  Chemistries are fine.  A.  Continue strict I/Os.  B.  Follow labs daily.  If the cr is stable ~4 tmrw then we will prob hold HD and assess for signs of recovery.    C.  Avoid nephrotoxics.    2.  Anemia. Pt's hb is stable.  No signs of bleeding.  A.  EPO 10k qHD.  B.  Follow hb, clinically.    3.  FEN.  Electrolytes are all better today x phos marian a bit.  A.  Follow electrolytes.        Interval History:     Pt is urinating more and had ~1L uo yday.  She is still retaining urine and required straight cathing.  No major uremic sx.          Medications and Allergies:      - MEDICATION INSTRUCTIONS for Dialysis Patients -   Does not apply See Admin Instructions    artificial saliva  2 spray Swish & Spit 4x Daily    B and C vitamin Complex with folic acid  5 mL Oral or Feeding Tube Daily    diltiazem ER COATED BEADS  180 mg Oral Daily    heparin ANTICOAGULANT  5,000 Units Subcutaneous Q8H    hydrALAZINE  10 mg Oral or  "Feeding Tube Q8H    levothyroxine  100 mcg Oral or NG Tube QAM AC    magnesium oxide  400 mg Oral Daily    nystatin   Topical BID    pantoprazole  40 mg Oral or Feeding Tube QAM AC    Or    pantoprazole  40 mg Intravenous QAM AC    polyethylene glycol  17 g Oral or NG Tube Daily    senna-docusate  1 tablet Oral or NG Tube BID    sodium chloride (PF)  3 mL Intracatheter Q8H    sodium chloride (PF)  9 mL Intracatheter During Dialysis/CRRT (from stock)    sodium chloride (PF) 0.9%  10 mL Intracatheter Once in dialysis/CRRT    traZODone  25 mg Oral At Bedtime     Allergies   Allergen Reactions    Fentanyl Nausea and Vomiting     VERY sensitive to most narcotics if not all.    Morphine And Related Nausea          Physical Exam:     Vitals were reviewed     , Blood pressure 106/57, pulse 92, temperature 98  F (36.7  C), temperature source Oral, resp. rate 18, height 1.651 m (5' 5\"), weight 96 kg (211 lb 10.3 oz), SpO2 96%.  Wt Readings from Last 3 Encounters:   11/19/23 96 kg (211 lb 10.3 oz)   10/19/23 95 kg (209 lb 8 oz)   09/28/23 97.1 kg (214 lb)     Intake/Output Summary (Last 24 hours) at 11/19/2023 1655  Last data filed at 11/19/2023 1455  Gross per 24 hour   Intake 650 ml   Output 745 ml   Net -95 ml     GENERAL APPEARANCE: pleasant, NAD, alert  RESP: CTA B c good efforts  CV: RRR, nl S1/S2   ABDOMEN: morbid obesity  EXTREMITIES/SKIN: tr ble edema  ACCESS:  [facility] TDC - site ok         Data:     CBC RESULTS:     Recent Labs   Lab 11/19/23  0653 11/18/23  0600 11/17/23  0654 11/16/23  2010 11/15/23  0801 11/14/23  0614 11/13/23  0534   WBC 6.3 6.0 5.6 6.5 7.1  --  8.6   RBC 3.05* 2.88* 2.69* 2.97* 3.16*  --  2.85*   HGB 8.7* 8.2* 7.6* 8.6* 9.1*  --  8.2*   HCT 28.9* 26.9* 25.0* 26.8* 29.7*  --  25.6*    212 156 158 211 129* 210     Basic Metabolic Panel:  Recent Labs   Lab 11/19/23  1628 11/19/23  1229 11/19/23  0839 11/19/23  0653 11/19/23  0221 11/18/23  2127 11/18/23  0853 11/18/23  0600 11/17/23  0917 " 11/17/23  0654 11/16/23 2127 11/16/23  2010 11/15/23  1301 11/15/23  0801 11/14/23  1644 11/14/23  1615   NA  --   --   --  138  --   --   --  138  --  134*  --  130*  --  133*  --  133*   POTASSIUM  --   --   --  3.9  --   --   --  4.1  --  4.4  --  4.6  --  4.8  --  4.5   CHLORIDE  --   --   --  100  --   --   --  100  --  99  --  95*  --  96*  --  96*   CO2  --   --   --  27  --   --   --  29  --  22  --  22  --  25  --  27   BUN  --   --   --  30.2*  --   --   --  23.9*  --  44.8*  --  40.9*  --  47.9*  --  40.0*   CR  --   --   --  4.22*  --   --   --  3.32*  --  4.64*  --  4.31*  --  4.62*  --  4.01*   * 137* 117* 116*  116* 122* 147*   < > 108*   < > 120*   < > 152*   < > 120*   < > 114*   TERESA  --   --   --  9.0  --   --   --  8.7*  --  8.5*  --  8.7*  --  8.8  --  8.2*    < > = values in this interval not displayed.     INRNo lab results found in last 7 days.   Attestation:   I have reviewed today's relevant vital signs, notes, medications, labs and imaging.    Wade Fish MD  [facility] - Nephrology  [phone]

## 2023-11-19 NOTE — PROGRESS NOTES
Bladder scan volume at 0041 hrs 531 mL. Pt declined straight catherization. Education provided about straight cathing to help bladder empty, and pt still refused. Pt  stating she wants to void independently and wants time to empty bladder unassisted. Pt did void 120 mL, bladder re-scanned 391 mL found. Pt again refused straight cath.

## 2023-11-19 NOTE — PROGRESS NOTES
Patient vital signs are at baseline: Yes, on RA  Patient able to ambulate as they were prior to admission or with assist devices provided by therapies during their stay:  No,  Reason:  Up with x2 Yee steady/lift. Pt was up in the chair, did ambulated with Pt for couple of steps, But to get but in bed staffs have tu use a lift.    Patient MUST void prior to discharge:  Yes via Pure-wick and incontinent of urine, Bladder scan for 17 mL. No BM on this shift.   Patient able to tolerate oral intake:  Yes, on Minced & Moist Diet    Pain has adequate pain control using Oral analgesics:  Yes  Does patient have an identified :  No,  Reason:  TBD  Has goal D/C date and time been discussed with patient:  No,  Reason: TBD     Patient A&Ox4, forgetful ,[place]. . CMS intact. Denied pain, SOB. C/o of nausea, Zofran IV given and was effective. Lower back incision CHIKI, mepilex place on the coccyx and L arm. PICC on the L arm with blood returned noted; CVC Double Lumen in place on L upper chest and PIV on R arm SL. Pt on Heparin injection. See MAR for Lipids orders. Pt on BS no insulin given. Hemodialysis M-W-F. Pending discharge plans. Will continue to monitor.

## 2023-11-19 NOTE — PLAN OF CARE
A/Ox4. VSS on RA now, weaned off from NOC O2 sating at 93-97%. Up A-2 w/ Yee steady. CMS intact. Back incision is CHIKI, almost healed. Fair appetite. Incontinent of bladder X1 this shift. Bladder scan this afternoon of 370 mls, no urge to void yet. Will continue bladder management protocol. Has dialysis port rt chest, intact. Denies pain when asked.

## 2023-11-19 NOTE — PROGRESS NOTES
Chart reviewed    Thoracic 11 to pelvis robotic decompression and fusion   -25 Days Post-Op     Chart reviewed no acute events overnight noted. SW coordinating placement.  Patient needing to work with therapies.  Recommend ARU at discharge.

## 2023-11-19 NOTE — PLAN OF CARE
Goal Outcome Evaluation:           Patient vital signs are at baseline: No, VSS on 1 Lpm via nasal cannula   Patient able to ambulate as they were prior to admission or with assist devices provided by therapies during their stay:  No,  Reason:  Not oob this shift, but day nurse states pt gets up with 2, vaughn steady into chair, and lift to get back to bed    Patient MUST void prior to discharge:  Voiding via PW but retaining urine straight cathed x1 see MAR, incontinent of bowel and bladder. Small BM this shift  Patient able to tolerate oral intake:  Yes, Minced & Moist Diet    Pain has adequate pain control using Oral analgesics:  Yes, denies pain  Does patient have an identified :  No,  Reason:  TBD  Has goal D/C date and time been discussed with patient:  No,  Reason: TBD      Aox4 (knows she's in hospital but forgets which one). [place], wears hearing aid in rt ear during the day. Denies numbness and tingling. Back incision open to air,  protective mepilex on sacrum/coccyx. Dialysis port on rt chest, PIV in rt arm, midline in LUE.

## 2023-11-20 ENCOUNTER — APPOINTMENT (OUTPATIENT)
Dept: OCCUPATIONAL THERAPY | Facility: CLINIC | Age: 83
DRG: 453 | End: 2023-11-20
Attending: NEUROLOGICAL SURGERY
Payer: COMMERCIAL

## 2023-11-20 ENCOUNTER — APPOINTMENT (OUTPATIENT)
Dept: PHYSICAL THERAPY | Facility: CLINIC | Age: 83
DRG: 453 | End: 2023-11-20
Attending: NEUROLOGICAL SURGERY
Payer: COMMERCIAL

## 2023-11-20 ENCOUNTER — APPOINTMENT (OUTPATIENT)
Dept: SPEECH THERAPY | Facility: CLINIC | Age: 83
DRG: 453 | End: 2023-11-20
Attending: NEUROLOGICAL SURGERY
Payer: COMMERCIAL

## 2023-11-20 LAB
ALBUMIN SERPL BCG-MCNC: 3.3 G/DL (ref 3.5–5.2)
ALP SERPL-CCNC: 127 U/L (ref 40–150)
ALT SERPL W P-5'-P-CCNC: 17 U/L (ref 0–50)
ANION GAP SERPL CALCULATED.3IONS-SCNC: 11 MMOL/L (ref 7–15)
AST SERPL W P-5'-P-CCNC: 29 U/L (ref 0–45)
BILIRUB SERPL-MCNC: 0.4 MG/DL
BUN SERPL-MCNC: 34.1 MG/DL (ref 8–23)
CALCIUM SERPL-MCNC: 9 MG/DL (ref 8.8–10.2)
CHLORIDE SERPL-SCNC: 98 MMOL/L (ref 98–107)
CREAT SERPL-MCNC: 4.5 MG/DL (ref 0.51–0.95)
DEPRECATED HCO3 PLAS-SCNC: 25 MMOL/L (ref 22–29)
EGFRCR SERPLBLD CKD-EPI 2021: 9 ML/MIN/1.73M2
ERYTHROCYTE [DISTWIDTH] IN BLOOD BY AUTOMATED COUNT: 16.7 % (ref 10–15)
GLUCOSE BLDC GLUCOMTR-MCNC: 111 MG/DL (ref 70–99)
GLUCOSE BLDC GLUCOMTR-MCNC: 117 MG/DL (ref 70–99)
GLUCOSE BLDC GLUCOMTR-MCNC: 128 MG/DL (ref 70–99)
GLUCOSE SERPL-MCNC: 167 MG/DL (ref 70–99)
GLUCOSE SERPL-MCNC: 167 MG/DL (ref 70–99)
HCT VFR BLD AUTO: 29.8 % (ref 35–47)
HGB BLD-MCNC: 8.9 G/DL (ref 11.7–15.7)
MAGNESIUM SERPL-MCNC: 1.9 MG/DL (ref 1.7–2.3)
MCH RBC QN AUTO: 28.3 PG (ref 26.5–33)
MCHC RBC AUTO-ENTMCNC: 29.9 G/DL (ref 31.5–36.5)
MCV RBC AUTO: 95 FL (ref 78–100)
PHOSPHATE SERPL-MCNC: 4 MG/DL (ref 2.5–4.5)
PLATELET # BLD AUTO: 224 10E3/UL (ref 150–450)
POTASSIUM SERPL-SCNC: 3.5 MMOL/L (ref 3.4–5.3)
PROT SERPL-MCNC: 6.1 G/DL (ref 6.4–8.3)
RBC # BLD AUTO: 3.15 10E6/UL (ref 3.8–5.2)
SODIUM SERPL-SCNC: 134 MMOL/L (ref 135–145)
TRIGL SERPL-MCNC: 143 MG/DL
WBC # BLD AUTO: 5.6 10E3/UL (ref 4–11)

## 2023-11-20 PROCEDURE — 99233 SBSQ HOSP IP/OBS HIGH 50: CPT | Performed by: INTERNAL MEDICINE

## 2023-11-20 PROCEDURE — 99207 PR CDG-CUT & PASTE-POTENTIAL IMPACT ON LEVEL: CPT | Performed by: INTERNAL MEDICINE

## 2023-11-20 PROCEDURE — 83735 ASSAY OF MAGNESIUM: CPT | Performed by: NEUROLOGICAL SURGERY

## 2023-11-20 PROCEDURE — 97530 THERAPEUTIC ACTIVITIES: CPT | Mod: GO

## 2023-11-20 PROCEDURE — 120N000001 HC R&B MED SURG/OB

## 2023-11-20 PROCEDURE — 250N000013 HC RX MED GY IP 250 OP 250 PS 637: Performed by: INTERNAL MEDICINE

## 2023-11-20 PROCEDURE — 250N000013 HC RX MED GY IP 250 OP 250 PS 637: Performed by: STUDENT IN AN ORGANIZED HEALTH CARE EDUCATION/TRAINING PROGRAM

## 2023-11-20 PROCEDURE — 250N000011 HC RX IP 250 OP 636: Performed by: INTERNAL MEDICINE

## 2023-11-20 PROCEDURE — 97530 THERAPEUTIC ACTIVITIES: CPT | Mod: GP

## 2023-11-20 PROCEDURE — 250N000013 HC RX MED GY IP 250 OP 250 PS 637: Performed by: NURSE PRACTITIONER

## 2023-11-20 PROCEDURE — 85027 COMPLETE CBC AUTOMATED: CPT | Performed by: NEUROLOGICAL SURGERY

## 2023-11-20 PROCEDURE — 250N000011 HC RX IP 250 OP 636: Mod: JZ | Performed by: NEUROLOGICAL SURGERY

## 2023-11-20 PROCEDURE — 250N000013 HC RX MED GY IP 250 OP 250 PS 637: Performed by: HOSPITALIST

## 2023-11-20 PROCEDURE — 36415 COLL VENOUS BLD VENIPUNCTURE: CPT | Performed by: NEUROLOGICAL SURGERY

## 2023-11-20 PROCEDURE — C9113 INJ PANTOPRAZOLE SODIUM, VIA: HCPCS | Mod: JZ | Performed by: NEUROLOGICAL SURGERY

## 2023-11-20 PROCEDURE — 92526 ORAL FUNCTION THERAPY: CPT | Mod: GN

## 2023-11-20 PROCEDURE — 84100 ASSAY OF PHOSPHORUS: CPT | Performed by: NEUROLOGICAL SURGERY

## 2023-11-20 PROCEDURE — 82040 ASSAY OF SERUM ALBUMIN: CPT | Performed by: NEUROLOGICAL SURGERY

## 2023-11-20 RX ADMIN — HYDRALAZINE HYDROCHLORIDE 10 MG: 10 TABLET ORAL at 17:02

## 2023-11-20 RX ADMIN — Medication 2 SPRAY: at 13:26

## 2023-11-20 RX ADMIN — TRAZODONE HYDROCHLORIDE 25 MG: 50 TABLET ORAL at 21:53

## 2023-11-20 RX ADMIN — SENNOSIDES AND DOCUSATE SODIUM 1 TABLET: 8.6; 5 TABLET ORAL at 13:26

## 2023-11-20 RX ADMIN — PANTOPRAZOLE SODIUM 40 MG: 40 INJECTION, POWDER, FOR SOLUTION INTRAVENOUS at 06:33

## 2023-11-20 RX ADMIN — HEPARIN SODIUM 5000 UNITS: 5000 INJECTION, SOLUTION INTRAVENOUS; SUBCUTANEOUS at 17:02

## 2023-11-20 RX ADMIN — Medication 2 SPRAY: at 17:05

## 2023-11-20 RX ADMIN — POLYETHYLENE GLYCOL 3350 17 G: 17 POWDER, FOR SOLUTION ORAL at 13:26

## 2023-11-20 RX ADMIN — LEVOTHYROXINE SODIUM 100 MCG: 100 TABLET ORAL at 06:33

## 2023-11-20 RX ADMIN — DILTIAZEM HYDROCHLORIDE 180 MG: 180 CAPSULE, COATED, EXTENDED RELEASE ORAL at 13:26

## 2023-11-20 RX ADMIN — NYSTATIN: 100000 CREAM TOPICAL at 20:59

## 2023-11-20 RX ADMIN — HEPARIN SODIUM 5000 UNITS: 5000 INJECTION, SOLUTION INTRAVENOUS; SUBCUTANEOUS at 09:10

## 2023-11-20 RX ADMIN — Medication 2 SPRAY: at 21:59

## 2023-11-20 RX ADMIN — Medication 5 ML: at 17:05

## 2023-11-20 RX ADMIN — ACETAMINOPHEN 650 MG: 325 TABLET, FILM COATED ORAL at 21:53

## 2023-11-20 RX ADMIN — MICONAZOLE NITRATE: 2 POWDER TOPICAL at 13:26

## 2023-11-20 RX ADMIN — HYDRALAZINE HYDROCHLORIDE 10 MG: 10 TABLET ORAL at 23:36

## 2023-11-20 RX ADMIN — HEPARIN SODIUM 5000 UNITS: 5000 INJECTION, SOLUTION INTRAVENOUS; SUBCUTANEOUS at 23:37

## 2023-11-20 RX ADMIN — Medication 400 MG: at 13:26

## 2023-11-20 RX ADMIN — MICONAZOLE NITRATE: 2 POWDER TOPICAL at 21:59

## 2023-11-20 ASSESSMENT — ACTIVITIES OF DAILY LIVING (ADL)
ADLS_ACUITY_SCORE: 40
ADLS_ACUITY_SCORE: 46
ADLS_ACUITY_SCORE: 46
ADLS_ACUITY_SCORE: 40
ADLS_ACUITY_SCORE: 44
ADLS_ACUITY_SCORE: 46
ADLS_ACUITY_SCORE: 44
ADLS_ACUITY_SCORE: 46
ADLS_ACUITY_SCORE: 44
ADLS_ACUITY_SCORE: 46
ADLS_ACUITY_SCORE: 46
ADLS_ACUITY_SCORE: 44

## 2023-11-20 NOTE — PLAN OF CARE
Goal Outcome Evaluation:             Patient vital signs are at baseline: VSS except BP elevated at times, on 1 Lpm via nasal cannula while sleeping  Patient able to ambulate as they were prior to admission or with assist devices provided by therapies during their stay:  No,  Reason:  Not oob this shift, but day nurse states pt gets up with 2, vaughn steady   Patient MUST void prior to discharge:  Voiding via PW but retaining urine straight cathed x1 see MAR for bladder scan volumes  Patient able to tolerate oral intake:  Yes, Minced & Moist Diet  & thickened liquids   Pain has adequate pain control using Oral analgesics: Denies pain  Does patient have an identified :  No,  Reason:  TBD  Has goal D/C date and time been discussed with patient:  No,  Reason: pending ARU placement      Aox4, forgetful. [place], wears hearing aid in rt ear during the day. Strict I/Os. Denies numbness and tingling. Back incision open to air. Dialysis port on rt chest, PIV in rt arm, midline in LUE meds ok but not working for blood draws. Hemodialysis on MWF.  Hydralazine held this morning because dialysis today.

## 2023-11-20 NOTE — PROGRESS NOTES
[facility]     Renal Progress Note       SHORTHAND KEY FOR MY NOTES:  c = with, s = without, p = after, a = before, x = except, asx = asymptomatic, tx = transplant or treatment, sx = symptoms or symptomatic, cx = canceled or culture, rxn = reaction, yday = yesterday, nl = normal, abx = antibiotics, fxn = function, dx = diagnosis, dz = disease, m/h = melena/hematochezia, c/d/l/ha = cramping/dizziness/lightheadedness/headache, d/c = discharge or diarrhea/constipation, f/c/n/v = fevers/chills/nausea/vomiting, cp/sob = chest pain/shortness of breath, tbv = total body volume, rxn = reaction, tdc = tunneled dialysis catheter, pta = prior to admission, hd = hemodialysis, pd = peritoneal dialysis, hhd = home hemodialysis, edw = estimated dry wt         Assessment/Plan:     1.  Severe LISA/CKD IIIa.  Pt's cr marian from 4.2 to 4.5 today and uo was a little less than the other day.  Even so, she doesn't have any uremic sx and TBV is ok.  Still showing potential for recovery so will not dialyse today.  A.  Check labs in AM and decide if she will need HD.  B.  Will not place orders for HD until we know our plan tmrw.  C.  Avoid nephrotoxics.    2.  Anemia. Pt's hb is stable.  No signs of bleeding.  A.  EPO 10k qHD.  If she doesn't dialyse, then we will give a dose of darbo.  B.  Follow hb, clinically.    3.  FEN.  Electrolytes are all ok today.    A.  Follow electrolytes.    Case d/w Dr. Xiao.        Interval History:     Pt is feeling fine and has no uremic sx.  UO is ok but less documented than yday.  Breathing ok.            Medications and Allergies:      - MEDICATION INSTRUCTIONS for Dialysis Patients -   Does not apply See Admin Instructions    artificial saliva  2 spray Swish & Spit 4x Daily    B and C vitamin Complex with folic acid  5 mL Oral or Feeding Tube Daily    diltiazem ER COATED BEADS  180 mg Oral Daily    heparin ANTICOAGULANT  5,000 Units Subcutaneous Q8H    hydrALAZINE  10 mg Oral or  "Feeding Tube Q8H    levothyroxine  100 mcg Oral or NG Tube QAM AC    magnesium oxide  400 mg Oral Daily    nystatin   Topical BID    pantoprazole  40 mg Oral or Feeding Tube QAM AC    Or    pantoprazole  40 mg Intravenous QAM AC    polyethylene glycol  17 g Oral or NG Tube Daily    senna-docusate  1 tablet Oral or NG Tube BID    sodium chloride (PF)  3 mL Intracatheter Q8H    sodium chloride (PF)  9 mL Intracatheter During Dialysis/CRRT (from stock)    sodium chloride (PF) 0.9%  10 mL Intracatheter Once in dialysis/CRRT    traZODone  25 mg Oral At Bedtime     Allergies   Allergen Reactions    Fentanyl Nausea and Vomiting     VERY sensitive to most narcotics if not all.    Morphine And Related Nausea          Physical Exam:     Vitals were reviewed     , Blood pressure (!) 154/77, pulse 77, temperature 98.2  F (36.8  C), temperature source Oral, resp. rate 16, height 1.651 m (5' 5\"), weight 99 kg (218 lb 4.1 oz), SpO2 96%.  Wt Readings from Last 3 Encounters:   11/20/23 99 kg (218 lb 4.1 oz)   10/19/23 95 kg (209 lb 8 oz)   09/28/23 97.1 kg (214 lb)     Intake/Output Summary (Last 24 hours) at 11/20/2023 1306  Last data filed at 11/20/2023 0600  Gross per 24 hour   Intake 180 ml   Output 575 ml   Net -395 ml     GENERAL APPEARANCE: pleasant, NAD, alert  RESP: CTA B c good efforts  CV: RRR, nl S1/S2   ABDOMEN: morbid obesity  EXTREMITIES/SKIN: tr ble edema  ACCESS:  [facility]DC - site ok         Data:     CBC RESULTS:     Recent Labs   Lab 11/20/23  0955 11/19/23  0653 11/18/23  0600 11/17/23  0654 11/16/23  2010 11/15/23  0801   WBC 5.6 6.3 6.0 5.6 6.5 7.1   RBC 3.15* 3.05* 2.88* 2.69* 2.97* 3.16*   HGB 8.9* 8.7* 8.2* 7.6* 8.6* 9.1*   HCT 29.8* 28.9* 26.9* 25.0* 26.8* 29.7*    222 212 156 158 211     Basic Metabolic Panel:  Recent Labs   Lab 11/20/23  0955 11/20/23  0227 11/19/23  2043 11/19/23  1628 11/19/23  1229 11/19/23  0839 11/19/23  0653 11/18/23  0853 11/18/23  0600 11/17/23  0917 11/17/23  0654 " 11/16/23  2127 11/16/23  2010 11/15/23  1301 11/15/23  0801   *  --   --   --   --   --  138  --  138  --  134*  --  130*  --  133*   POTASSIUM 3.5  --   --   --   --   --  3.9  --  4.1  --  4.4  --  4.6  --  4.8   CHLORIDE 98  --   --   --   --   --  100  --  100  --  99  --  95*  --  96*   CO2 25  --   --   --   --   --  27  --  29  --  22  --  22  --  25   BUN 34.1*  --   --   --   --   --  30.2*  --  23.9*  --  44.8*  --  40.9*  --  47.9*   CR 4.50*  --   --   --   --   --  4.22*  --  3.32*  --  4.64*  --  4.31*  --  4.62*   *  167* 111* 124* 149* 137* 117* 116*  116*   < > 108*   < > 120*   < > 152*   < > 120*   TERESA 9.0  --   --   --   --   --  9.0  --  8.7*  --  8.5*  --  8.7*  --  8.8    < > = values in this interval not displayed.     INRNo lab results found in last 7 days.   Attestation:   I have reviewed today's relevant vital signs, notes, medications, labs and imaging.    Wade Fish MD  [facility] - Nephrology  [phone]

## 2023-11-20 NOTE — PROGRESS NOTES
"CLINICAL NUTRITION SERVICES - REASSESSMENT NOTE    Malnutrition: 11/20  % Weight Loss:  Weight loss does not meet criteria for malnutrition   % Intake:  Decreased intake does not meet criteria for malnutrition - previously on PPN  Subcutaneous Fat Loss:  None observed  Muscle Loss:  None observed  Fluid Retention:  None noted    Malnutrition Diagnosis: Patient does not meet two of the above criteria necessary for diagnosing malnutrition     EVALUATION OF PROGRESS TOWARD GOALS   Diet: Minced & Moist (level 5), slightly thick liquids  Supplements TID w/ meals (magic cup, nepro, gelatein)  Intake/Tolerance:   - Pt is eating between 25-75% of meals. Appetite is marked as \"poor\" to \"fair\". Meals + supplements provide adequate kcal/protein.   - Pt seen in room this afternoon. She said she did OK with breakfast- ate hot cereal and yogurt, but wasn't able to eat the rest of her meal due to an interruption for cares.   - She reports she ate a good lunch yesterday, but then wasn't hungry for dinner.   - We reviewed her supplement orders (outlined above), she is OK with continuing all of them with flavor substitution.   - Pt reported that regular jello and a popsicle sounded good, but she can't have them right now due to diet order for slightly thick liquids.     - Pt previously receiving PPN to supplement oral intake. Off since 11/16.     - Labs: Na 134 (L), BUN 34.1 (H), Cr 4.5 (H)  Recent Labs   Lab 11/20/23  0955 11/20/23  0227 11/19/23  2043 11/19/23  1628 11/19/23  1229 11/19/23  0839   *  167* 111* 124* 149* 137* 117*     - Stooling:   BM x1 almost daily   - Weight trends:   99 kg today. Suspect majority of weight shifts are related to changes in fluid status with dialysis.     Meds:   Nephronex - renal multivit  Levothyroxine  Mag-ox  Miralax, Senokot - scheduled     ASSESSED NUTRITION NEEDS: (11/6)  Dosing Weight:  67.9 kg (adjusted, based on 101.2 kg-- 11/2)  Estimated Energy Needs: 9295-8772 kcals (25-30 " Kcal/Kg)  Justification: maintenance  Estimated Protein Needs:  grams protein (1.2-1.8 g pro/Kg)  Justification: dialysis  Estimated Fluid Needs: per provider pending fluid status    NEW FINDINGS:   - Planning discharge to ARU    Previous Goals:   PO + PPN to meet % of estimated nutrition needs   Evaluation: Not met - PPN is off as of 11/16  Nutrition POC be determined w/in 72 hours   Evaluation: Met with discontinuation of PPN, and no plans for FT placement    Previous Nutrition Diagnosis:   Inadequate oral intake related to poor appetite, dysphagia as evidenced by kcal ct meeting 20% minimum energy needs and 30% minimum protein needs, continued supplemental PPN   Evaluation: Improving      MALNUTRITION  % Weight Loss:  Weight loss does not meet criteria for malnutrition   % Intake:  Decreased intake does not meet criteria for malnutrition - previously on PPN  Subcutaneous Fat Loss:  None observed  Muscle Loss:  None observed  Fluid Retention:  None noted    Malnutrition Diagnosis: Patient does not meet two of the above criteria necessary for diagnosing malnutrition    CURRENT NUTRITION DIAGNOSIS  Inadequate oral intake related to fluctuating appetite, early satiety as evidenced by patient is able to eat well for 1-2 meals/day, eats less for the other 1-2 meals.     INTERVENTIONS  Recommendations / Nutrition Prescription  Diet per SLP  Supplements w/ meals TID  - Gelatein, Magic Cup, Nepro    Implementation  Medical Food Supplement - adjusted flavors per preference    Goals  Intake of at least 50% of adequate trays TID including supplements.     MONITORING AND EVALUATION:  Progress towards goals will be monitored and evaluated per protocol and Practice Guidelines    Kasie Gibbons RD, LD  Pager: [phone]  Weekend Pager: [phone]

## 2023-11-20 NOTE — PLAN OF CARE
[id number] shift update:    Alert and oriented x4. Forgetful at times. VSS on RA. Denies chest pain and SOB. Neuro and CMS intact. Denies pain. Back incision with steri-strips, intact. Tolerating reg diet. +BS. +flatus. Incontinent of urine once. PVR 326ml. Midline to LUE SL. CVC catheter intact. Mince and moist with slight thick liquid. Up Ax2 with vaughn steady. Turn and repo done.

## 2023-11-20 NOTE — PROGRESS NOTES
[facility]    Hospitalist Progress Note    Interval History   Feeling fine, appetite improved  No N/V, no abd pain  Hab BM in am  Denies chest pain, no SOB  Has urinary incontinence and some retention issues  Discussed with RN     Assessment & Plan   Summary: Mya Hui is a 83 year old female with PMH Scoliosis, Lumbar radiculopathy, HTN, HLP, DM2, Hypothyroidism, CKD stage 3, Morbid obesity, GERD, Sliding hiatal hernia, Overactive bladder, OA, Chronic right shoulder pain/rotator cuff arthropathy, Migraines, Asymptomatic carotid artery stenosis who was admitted on 10/25/2023 with an elective T11 to pelvis robotic decompression and fusion.  Surgery was performed on 10/25/23 under general anesthesia.  Surgery was complicated with development of arrhythmia with significant hypotension and noted 3.5 L blood loss per Op Note and Anesthesia postprocedure evaluation note.  Patient received IV fluids, 6 units of pRBCs and 2 units of FFP intra-op.  ST changes were noted intraoperatively.  Patient remained intubated and started on Levophed and was transferred to the ICU due to hemorrhagic shock.  She required vasopressor support with Levophed until 10/27. Transferred out of the ICU with hospitalist comanging since 11/03    Post-op hemorrhagic shock, resolved  Shocked liver-resolved  Type 2 MI due to hemorrhagic shock, improved  Surgery was complicated with development of arrhythmia with significant hypotension and noted 3.5 L blood loss per Op Note and Anesthesia postprocedure evaluation note.  Patient received IV fluids, 6 units of pRBCs and 2 units of FFP intra-op. Patient received additional 2 units FFP, 10 units of cryo and 1 unit of PLT while in the ICU. LFTs continue to improve 11/5-11/7.  - doing fine  - PT/OT rec ARU    Encephalopathy, multifactorial, improving  Incidental finding of left basal ganglia restricted diffusion  Patient was noted to be encephalopathic for which Vascular  Neurology was consulted.  Brain MRI was completed and noted a left basal ganglia small focus of restricted diffusion but not a cause for encephalopathy and felt it was likely multifactorial.  EEG monitor was completed and negative and subsequently stopped.  Suspect due to uremic encephalopathy and sepsis.   Encephalopathy improved 11/6  Delirium prevention:   - Reorient patient at each interaction.  - Keep room lights on and blinds open during day (8am-8pm), low lights 8pm-8am.  - Minimize interruptions of sleep at night (consolidate vitals, nursing assessments, medications).  - Avoid sedating medications as able.    - Vascular Neurology recommended repeat brain MRI in 2-3 months.      LISA/ATN requiring hemodialysis  Anion gap metabolic acidosis due to above  CKD stage 3a  Hypoalbuminemia  Anasarca  Baseline creatinine between 1.1-1.4 with GFR in the 40-50's.   Due to development of LISA-D Nephrology was consulted and tunnel dialysis catheter placed 10/30 and has been undergoing HD Mon, Wed, Fri (last session was today, 11/3/2023).       - Continue dialysis, now on MWF schedule. Will need outpatient dialysis as we await recovery of renal function    Anemia  Hb stable ~8 since her initial hemorrhage. 11/17- Hb lower at 7.6 No bleeding noted. Nephrology planning to give EPO   - Hb now  8.2--8.7  - monitor with intermittent labs    Aspiration/ventilator associated PNA (Enterobacter, stenotrophomonas and E. Coli)  Acute hypoxic respiratory failure resolved  Leukocytosis due to above  Extubated 11/2. Patient has also developed aspiration/ventilator associated pneumonia with sputum Cx growing Enterobacter, stenotrophomonas and E. coli and was initially started on Zoysn and switched to Meropenem on 11/1/2023.  Currently continued on Meropenem and awaiting final cultures. Procal 3.   - completed 14 days course of antibiotics 11/14  - Encourage use of IS/Flutter valve.      Dysphagia-improving  Moderate malnutrition  Failed  "SLP assessment. Bedside feeding tube placement coiling due to hiatal hernia. Started PPN     - improving swallow function. SLP does not recommend pursing PEG.   - continue modified diet per SLP with supplements; currently on DD 5 with slightly thick liquids  - ARU vs TCU on discharge for continue SLP rehab    Scoliosis  Lumbar radiculopathy  S/p T11 to pelvis robotic decompression and fusion  - Neurosurgery is managing.  - PT/OT  - plan to discharge to ARU    HTN  NSVT  - continue diltiazem  mg po daily and hydralazine 10 mg po q8h   - holding PTA lisinopril for ATN    HLP  - Hold PTA pravastatin 40 mg/d to reduce pill burden     Type 2 DM, controlled  Stress induced hyperglycemia  - no insulin needs recently, possible due to antibiotic use  - consider reduction in glucose monitoring    Hypothyroidism   - continue PTA synthroid     GERD  Sliding hiatal hernia  - PTA PPI    OA  Chronic right shoulder pain/rotator cuff arthropathy  Chronic Pain  - Pain/analgesic management per Neurosurgery.      Obesity   Suspected ANAYELI  Body mass index is 37.64 kg/m .  Increase in all-cause morbidity and mortality.   - Follow up with PCP regarding ongoing management.    - Monitor O2 saturations.    - Recommend outpatient sleep study.      Overactive bladder  - Holding PTA Ditropan XL 10 mg/d.      Migraines: Pain/analgesic management per Neurosurgery.      Asymptomatic carotid artery stenosis.    Hypomagnesemia   - RN replacement protocols    Clinically Significant Risk Factors              # Hypoalbuminemia: Lowest albumin = 2.5 g/dL at 11/1/2023  4:19 AM, will monitor as appropriate     # Hypertension: Noted on problem list        # Obesity: Estimated body mass index is 36.32 kg/m  as calculated from the following:    Height as of this encounter: 1.651 m (5' 5\").    Weight as of this encounter: 99 kg (218 lb 4.1 oz).   # Moderate Malnutrition: based on nutrition assessment      # Financial/Environmental Concerns: none        "     Diet: Snacks/Supplements Adult: Gelatein Plus; With Meals  Combination Diet Minced and Moist Diet (level 5); Slightly Thick (level 1) (By teaspoon)    DVT Prophylaxis: Heparin SQ  Spicer Catheter: Not present  Lines: PRESENT      CVC Double Lumen Right Internal jugular Non - valved (open ended);Tunneled-Site Assessment: WDL      Cardiac Monitoring: None  Code Status: Full Code    Disposition: ARU assessing    Roxanna Symone Xiao MD  Hospitalist Service  [facility]  Securely message with [employer] (more info)  Text page via [employer] Paging/Directory     Data reviewed today: I reviewed all new labs and imaging results over the last 24 hours.     Medical Decision Making       40 MINUTES SPENT BY ME on the date of service doing chart review, history, exam, documentation & further activities per the note.       Physical Exam   Temp: 98.2  F (36.8  C) Temp src: Oral BP: (!) 154/77 Pulse: 77   Resp: 16 SpO2: 96 % O2 Device: Nasal cannula Oxygen Delivery: 2 LPM  Vitals:    11/18/23 0500 11/19/23 0500 11/20/23 0251   Weight: 95 kg (209 lb 7 oz) 96 kg (211 lb 10.3 oz) 99 kg (218 lb 4.1 oz)     Vital Signs with Ranges  Temp:  [97.8  F (36.6  C)-98.7  F (37.1  C)] 98.2  F (36.8  C)  Pulse:  [70-92] 77  Resp:  [16-18] 16  BP: (106-154)/(53-78) 154/77  SpO2:  [85 %-97 %] 96 %  I/O last 3 completed shifts:  In: 420 [P.O.:420]  Out: 575 [Urine:575]  O2 requirements: no    Constitutional: Elderly female, up in the chair  HEENT: Eyes nonicteric, oral mucosa moist  Cardiovascular: S1S2, RRR  Respiratory: bilateral air entry, no wheezing, no rales, no crackles  Vascular: Trace BLE pitting edema  GI: Normoactive bowel sounds, appears nontender  Skin/Integumen: No rashes  Neuro:AAOX3, no FNDs  Psych- normal mood    Medications    dextrose          - MEDICATION INSTRUCTIONS for Dialysis Patients -   Does not apply See Admin Instructions    artificial saliva  2 spray Swish & Spit 4x Daily    B and C vitamin Complex  with folic acid  5 mL Oral or Feeding Tube Daily    diltiazem ER COATED BEADS  180 mg Oral Daily    heparin ANTICOAGULANT  5,000 Units Subcutaneous Q8H    hydrALAZINE  10 mg Oral or Feeding Tube Q8H    levothyroxine  100 mcg Oral or NG Tube QAM AC    magnesium oxide  400 mg Oral Daily    nystatin   Topical BID    pantoprazole  40 mg Oral or Feeding Tube QAM AC    Or    pantoprazole  40 mg Intravenous QAM AC    polyethylene glycol  17 g Oral or NG Tube Daily    senna-docusate  1 tablet Oral or NG Tube BID    sodium chloride (PF)  3 mL Intracatheter Q8H    sodium chloride (PF)  9 mL Intracatheter During Dialysis/CRRT (from stock)    sodium chloride (PF) 0.9%  10 mL Intracatheter Once in dialysis/CRRT    traZODone  25 mg Oral At Bedtime       Data   Recent Labs   Lab 11/20/23  0955 11/20/23  0227 11/19/23  2043 11/19/23  1628 11/19/23  0839 11/19/23  0653 11/18/23  0853 11/18/23  0600 11/17/23  0917 11/17/23  0654   WBC 5.6  --   --   --   --  6.3  --  6.0  --  5.6   HGB 8.9*  --   --   --   --  8.7*  --  8.2*  --  7.6*   MCV 95  --   --   --   --  95  --  93  --  93     --   --   --   --  222  --  212  --  156   NA  --   --   --   --   --  138  --  138  --  134*   POTASSIUM  --   --   --   --   --  3.9  --  4.1  --  4.4   CHLORIDE  --   --   --   --   --  100  --  100  --  99   CO2  --   --   --   --   --  27  --  29  --  22   BUN  --   --   --   --   --  30.2*  --  23.9*  --  44.8*   CR  --   --   --   --   --  4.22*  --  3.32*  --  4.64*   ANIONGAP  --   --   --   --   --  11  --  9  --  13   TERESA  --   --   --   --   --  9.0  --  8.7*  --  8.5*   GLC  --  111* 124* 149*   < > 116*  116*   < > 108*   < > 120*   ALBUMIN  --   --   --   --   --  3.2*  --  3.2*  --  3.1*    < > = values in this interval not displayed.       Imaging:   No results found for this or any previous visit (from the past 24 hour(s)).

## 2023-11-20 NOTE — PROGRESS NOTES
Care Management Follow Up    Length of Stay (days): 26    Expected Discharge Date: 11/21/2023     Concerns to be Addressed: discharge planning     Patient plan of care discussed at interdisciplinary rounds: Yes    Anticipated Discharge Disposition: Home, Home Care, Dialysis Services, Skilled Nursing Facility, Transitional Care     Anticipated Discharge Services: Transportation Services  Anticipated Discharge DME: Other (see comment) (to be determined)    Patient/family educated on Medicare website which has current facility and service quality ratings:    Education Provided on the Discharge Plan: Yes  Patient/Family in Agreement with the Plan: unable to assess (discussed mutiple discharge choices)    Referrals Placed by CM/SW: External Care Coordination, Specialty Providers  Private pay costs discussed: Not applicable    Additional Information:  Writer spoke with Jabari who is liaison for [facility] from ARU today. Jabari stated the patient looks appropriate for ARU at this time but would like to know who would be a support for the patient post ARU. Jabari stated they would have a bed hoping at the end of this week due to 10 person waitlist.     Addendum 1244:  Writer sent additional referrals for ARU to the [facility].     Writer called the patient's daughter, April, [phone], to update and discuss post ARU. April stated they would like the patient to be rehabbed enough to be safe home alone due to them not being able to provide 24/7 support due to the family working and not able to provide a couple hours worth of support. April stated if the patient is not able to be rehabbed to that level they would hope the patient transition to a TCU, specifically [facility].     Daughter requested to have an update from PT and the neurosurgeon.     Writer will relay message to Jabari at ARU.         GAMALIEL Pang  [facility]  Social Work

## 2023-11-20 NOTE — PROGRESS NOTES
"Neurosurgery Progress     Dx:     POD #26 T11 to pelvis robotic decompression and fusion.     Neuro stable.     TODAY'S PLAN:     -ARU placement.  -Advance activity as tolerated.  -Continue supportive and symptomatic treatment.  -Continue physical therapy.  -Pain control measures.  -Advance diet as tolerated.  -Routine wound care.     In the event that patient's symptoms worsen or change we would appreciate being contacted. We did discuss signs of a worsening problem that she should seek being evaluated.     We did review the above information with the patient whom agrees with the plan and did verbalize understanding.   ________________________________________________________________     BP (!) 154/77 (BP Location: Right arm)   Pulse 77   Temp 98.2  F (36.8  C) (Oral)   Resp 16   Ht 1.651 m (5' 5\")   Wt 99 kg (218 lb 4.1 oz)   LMP  (LMP Unknown)   SpO2 96%   BMI 36.32 kg/m       Pt in bed. Appears comfortable and in no apparent distress, moving all extremities.   CN II-XII intact, alert and appropriate with conversation and following commands.   Bilateral upper and lower extremities with appropriate strength. Sensation intact throughout. Acceptable ROM.   Calves soft and non-tender bilaterally.     All pertinent labs and updated imaging reviewed in EPIC.     Cliff Rai PA-C   Neurosurgery   [phone] (P)       "

## 2023-11-21 ENCOUNTER — APPOINTMENT (OUTPATIENT)
Dept: OCCUPATIONAL THERAPY | Facility: CLINIC | Age: 83
DRG: 453 | End: 2023-11-21
Attending: NEUROLOGICAL SURGERY
Payer: COMMERCIAL

## 2023-11-21 ENCOUNTER — APPOINTMENT (OUTPATIENT)
Dept: SPEECH THERAPY | Facility: CLINIC | Age: 83
DRG: 453 | End: 2023-11-21
Attending: NEUROLOGICAL SURGERY
Payer: COMMERCIAL

## 2023-11-21 ENCOUNTER — APPOINTMENT (OUTPATIENT)
Dept: PHYSICAL THERAPY | Facility: CLINIC | Age: 83
DRG: 453 | End: 2023-11-21
Attending: NEUROLOGICAL SURGERY
Payer: COMMERCIAL

## 2023-11-21 LAB
ALBUMIN SERPL BCG-MCNC: 3.2 G/DL (ref 3.5–5.2)
ANION GAP SERPL CALCULATED.3IONS-SCNC: 14 MMOL/L (ref 7–15)
BUN SERPL-MCNC: 41.1 MG/DL (ref 8–23)
CALCIUM SERPL-MCNC: 9.1 MG/DL (ref 8.8–10.2)
CHLORIDE SERPL-SCNC: 100 MMOL/L (ref 98–107)
CREAT SERPL-MCNC: 4.62 MG/DL (ref 0.51–0.95)
DEPRECATED HCO3 PLAS-SCNC: 24 MMOL/L (ref 22–29)
EGFRCR SERPLBLD CKD-EPI 2021: 9 ML/MIN/1.73M2
ERYTHROCYTE [DISTWIDTH] IN BLOOD BY AUTOMATED COUNT: 16.4 % (ref 10–15)
GLUCOSE BLDC GLUCOMTR-MCNC: 105 MG/DL (ref 70–99)
GLUCOSE BLDC GLUCOMTR-MCNC: 125 MG/DL (ref 70–99)
GLUCOSE BLDC GLUCOMTR-MCNC: 166 MG/DL (ref 70–99)
GLUCOSE SERPL-MCNC: 119 MG/DL (ref 70–99)
HCT VFR BLD AUTO: 29.5 % (ref 35–47)
HGB BLD-MCNC: 9.1 G/DL (ref 11.7–15.7)
MCH RBC QN AUTO: 28.5 PG (ref 26.5–33)
MCHC RBC AUTO-ENTMCNC: 30.8 G/DL (ref 31.5–36.5)
MCV RBC AUTO: 93 FL (ref 78–100)
PHOSPHATE SERPL-MCNC: 4.6 MG/DL (ref 2.5–4.5)
PLATELET # BLD AUTO: 254 10E3/UL (ref 150–450)
POTASSIUM SERPL-SCNC: 3.8 MMOL/L (ref 3.4–5.3)
RBC # BLD AUTO: 3.19 10E6/UL (ref 3.8–5.2)
SODIUM SERPL-SCNC: 138 MMOL/L (ref 135–145)
WBC # BLD AUTO: 7.1 10E3/UL (ref 4–11)

## 2023-11-21 PROCEDURE — 250N000013 HC RX MED GY IP 250 OP 250 PS 637: Performed by: NURSE PRACTITIONER

## 2023-11-21 PROCEDURE — 250N000013 HC RX MED GY IP 250 OP 250 PS 637: Performed by: INTERNAL MEDICINE

## 2023-11-21 PROCEDURE — 80069 RENAL FUNCTION PANEL: CPT | Performed by: INTERNAL MEDICINE

## 2023-11-21 PROCEDURE — 97116 GAIT TRAINING THERAPY: CPT | Mod: GP

## 2023-11-21 PROCEDURE — 999N000040 HC STATISTIC CONSULT NO CHARGE VASC ACCESS

## 2023-11-21 PROCEDURE — 36415 COLL VENOUS BLD VENIPUNCTURE: CPT | Performed by: INTERNAL MEDICINE

## 2023-11-21 PROCEDURE — 92526 ORAL FUNCTION THERAPY: CPT | Mod: GN

## 2023-11-21 PROCEDURE — 97535 SELF CARE MNGMENT TRAINING: CPT | Mod: GO

## 2023-11-21 PROCEDURE — 250N000011 HC RX IP 250 OP 636: Performed by: INTERNAL MEDICINE

## 2023-11-21 PROCEDURE — 97530 THERAPEUTIC ACTIVITIES: CPT | Mod: GP

## 2023-11-21 PROCEDURE — 250N000011 HC RX IP 250 OP 636: Mod: JZ | Performed by: NURSE PRACTITIONER

## 2023-11-21 PROCEDURE — 97530 THERAPEUTIC ACTIVITIES: CPT | Mod: GO

## 2023-11-21 PROCEDURE — 99232 SBSQ HOSP IP/OBS MODERATE 35: CPT | Performed by: INTERNAL MEDICINE

## 2023-11-21 PROCEDURE — 250N000013 HC RX MED GY IP 250 OP 250 PS 637: Performed by: STUDENT IN AN ORGANIZED HEALTH CARE EDUCATION/TRAINING PROGRAM

## 2023-11-21 PROCEDURE — 250N000011 HC RX IP 250 OP 636: Mod: JZ | Performed by: NEUROLOGICAL SURGERY

## 2023-11-21 PROCEDURE — 99207 PR CDG-CUT & PASTE-POTENTIAL IMPACT ON LEVEL: CPT | Performed by: INTERNAL MEDICINE

## 2023-11-21 PROCEDURE — C9113 INJ PANTOPRAZOLE SODIUM, VIA: HCPCS | Mod: JZ | Performed by: NEUROLOGICAL SURGERY

## 2023-11-21 PROCEDURE — 250N000013 HC RX MED GY IP 250 OP 250 PS 637: Performed by: HOSPITALIST

## 2023-11-21 PROCEDURE — 120N000001 HC R&B MED SURG/OB

## 2023-11-21 PROCEDURE — 85027 COMPLETE CBC AUTOMATED: CPT | Performed by: INTERNAL MEDICINE

## 2023-11-21 PROCEDURE — 99233 SBSQ HOSP IP/OBS HIGH 50: CPT | Performed by: INTERNAL MEDICINE

## 2023-11-21 RX ADMIN — ONDANSETRON 4 MG: 2 INJECTION INTRAMUSCULAR; INTRAVENOUS at 07:59

## 2023-11-21 RX ADMIN — DILTIAZEM HYDROCHLORIDE 180 MG: 180 CAPSULE, COATED, EXTENDED RELEASE ORAL at 11:09

## 2023-11-21 RX ADMIN — MICONAZOLE NITRATE: 2 POWDER TOPICAL at 11:10

## 2023-11-21 RX ADMIN — HYDRALAZINE HYDROCHLORIDE 10 MG: 10 TABLET ORAL at 15:25

## 2023-11-21 RX ADMIN — TRAZODONE HYDROCHLORIDE 25 MG: 50 TABLET ORAL at 22:47

## 2023-11-21 RX ADMIN — SENNOSIDES AND DOCUSATE SODIUM 1 TABLET: 8.6; 5 TABLET ORAL at 11:09

## 2023-11-21 RX ADMIN — HYDRALAZINE HYDROCHLORIDE 10 MG: 10 TABLET ORAL at 07:10

## 2023-11-21 RX ADMIN — Medication 400 MG: at 11:09

## 2023-11-21 RX ADMIN — Medication 5 ML: at 18:32

## 2023-11-21 RX ADMIN — LEVOTHYROXINE SODIUM 100 MCG: 100 TABLET ORAL at 07:10

## 2023-11-21 RX ADMIN — NYSTATIN: 100000 CREAM TOPICAL at 19:59

## 2023-11-21 RX ADMIN — PANTOPRAZOLE SODIUM 40 MG: 40 INJECTION, POWDER, FOR SOLUTION INTRAVENOUS at 07:10

## 2023-11-21 RX ADMIN — HEPARIN SODIUM 5000 UNITS: 5000 INJECTION, SOLUTION INTRAVENOUS; SUBCUTANEOUS at 08:05

## 2023-11-21 RX ADMIN — SENNOSIDES AND DOCUSATE SODIUM 1 TABLET: 8.6; 5 TABLET ORAL at 22:47

## 2023-11-21 RX ADMIN — Medication 2 SPRAY: at 15:21

## 2023-11-21 RX ADMIN — Medication 2 SPRAY: at 11:11

## 2023-11-21 RX ADMIN — Medication 2 SPRAY: at 22:50

## 2023-11-21 RX ADMIN — HYDRALAZINE HYDROCHLORIDE 10 MG: 10 TABLET ORAL at 22:47

## 2023-11-21 RX ADMIN — NYSTATIN: 100000 CREAM TOPICAL at 15:28

## 2023-11-21 RX ADMIN — HEPARIN SODIUM 5000 UNITS: 5000 INJECTION, SOLUTION INTRAVENOUS; SUBCUTANEOUS at 15:25

## 2023-11-21 ASSESSMENT — ACTIVITIES OF DAILY LIVING (ADL)
ADLS_ACUITY_SCORE: 40
ADLS_ACUITY_SCORE: 39
ADLS_ACUITY_SCORE: 40
ADLS_ACUITY_SCORE: 35
ADLS_ACUITY_SCORE: 40
ADLS_ACUITY_SCORE: 40
ADLS_ACUITY_SCORE: 35
ADLS_ACUITY_SCORE: 35
ADLS_ACUITY_SCORE: 40
ADLS_ACUITY_SCORE: 35

## 2023-11-21 NOTE — PROGRESS NOTES
Care Management Follow Up    Length of Stay (days): 27    Expected Discharge Date: 11/22/2023     Concerns to be Addressed: discharge planning     Patient plan of care discussed at interdisciplinary rounds: Yes    Anticipated Discharge Disposition: Home, Home Care, Dialysis Services, Skilled Nursing Facility, Transitional Care     Anticipated Discharge Services: Transportation Services  Anticipated Discharge DME: Other (see comment) (to be determined)    Patient/family educated on Medicare website which has current facility and service quality ratings:    Education Provided on the Discharge Plan: Yes  Patient/Family in Agreement with the Plan: unable to assess (discussed mutiple discharge choices)    Referrals Placed by CM/SW: External Care Coordination, Specialty Providers  Private pay costs discussed: Not applicable    Additional Information:  Writer called the patient's daughter, Liberty Clements, [phone] 2 discussed support system plan post ARU discharge.    April discussed the family's plan of having the patient discharge from ARU with [facility].  Writer clarified that although this is the plan the patient would still need a 24/7 supervision plan in place.  Writer clarified that this is essential for ARU for the safety of the patient post discharge, without it they will not accept the patient to an ARU.     Upon further discussion, daughter stated they are willing to provide assistance as long as the patient is rehab able to the point where they feel comfortable providing that assistance.  Patient's daughter stated although they still work and have no financial concerns for the patient's treatment they will look into providing private pay PCA services, after being mentioned by the writer.    Writer updated isra Barboza of [facility].    Writer called [facility], [phone], and spoke with Danita.  Writer updated Danita on post ARU support system plan.  Danita stated they will update the writer  tomorrow upon further review of the patient.        GAMALIEL Pang  [facility]  Social Work

## 2023-11-21 NOTE — PROGRESS NOTES
[facility]     Renal Progress Note       SHORTHAND KEY FOR MY NOTES:  c = with, s = without, p = after, a = before, x = except, asx = asymptomatic, tx = transplant or treatment, sx = symptoms or symptomatic, cx = canceled or culture, rxn = reaction, yday = yesterday, nl = normal, abx = antibiotics, fxn = function, dx = diagnosis, dz = disease, m/h = melena/hematochezia, c/d/l/ha = cramping/dizziness/lightheadedness/headache, d/c = discharge or diarrhea/constipation, f/c/n/v = fevers/chills/nausea/vomiting, cp/sob = chest pain/shortness of breath, tbv = total body volume, rxn = reaction, tdc = tunneled dialysis catheter, pta = prior to admission, hd = hemodialysis, pd = peritoneal dialysis, hhd = home hemodialysis, edw = estimated dry wt         Assessment/Plan:     1.  Severe LISA/CKD IIIa.  Pt's cr is stable overnight.  She is making more urine, but seems to have some urinary retention (1340 ml from straight cath yday).  Chemistries are fine and no uremic sx.  TBV is good.  A.  Follow labs, uo, sx daily.  B.  Avoid nephrotoxics.  C.  Place sykes given urinary retention.    2.  Anemia. Pt's hb is stable.  No signs of bleeding.  A.  Follow hb, clinically.    3.  FEN.  Electrolytes are good.      A.  Follow electrolytes daily.    Case d/w Dr. Xiao.        Interval History:     Pt feels ok.  No uremic sx.  Breathing ok.  She is urinating but has a lot of uo from straight cathing.          Medications and Allergies:      - MEDICATION INSTRUCTIONS for Dialysis Patients -   Does not apply See Admin Instructions    artificial saliva  2 spray Swish & Spit 4x Daily    B and C vitamin Complex with folic acid  5 mL Oral or Feeding Tube Daily    diltiazem ER COATED BEADS  180 mg Oral Daily    heparin ANTICOAGULANT  5,000 Units Subcutaneous Q8H    hydrALAZINE  10 mg Oral or Feeding Tube Q8H    levothyroxine  100 mcg Oral or NG Tube QAM AC    magnesium oxide  400 mg Oral Daily    nystatin   Topical BID  "   pantoprazole  40 mg Oral or Feeding Tube QAM AC    Or    pantoprazole  40 mg Intravenous QAM AC    polyethylene glycol  17 g Oral or NG Tube Daily    senna-docusate  1 tablet Oral or NG Tube BID    sodium chloride (PF)  3 mL Intracatheter Q8H    sodium chloride (PF)  9 mL Intracatheter During Dialysis/CRRT (from stock)    traZODone  25 mg Oral At Bedtime     Allergies   Allergen Reactions    Fentanyl Nausea and Vomiting     VERY sensitive to most narcotics if not all.    Morphine And Related Nausea          Physical Exam:     Vitals were reviewed     , Blood pressure 125/72, pulse 82, temperature 97.3  F (36.3  C), temperature source Oral, resp. rate 18, height 1.651 m (5' 5\"), weight 99 kg (218 lb 4.1 oz), SpO2 98%.  Wt Readings from Last 3 Encounters:   11/20/23 99 kg (218 lb 4.1 oz)   10/19/23 95 kg (209 lb 8 oz)   09/28/23 97.1 kg (214 lb)     Intake/Output Summary (Last 24 hours) at 11/21/2023 1605  Last data filed at 11/21/2023 0525  Gross per 24 hour   Intake 75 ml   Output 1340 ml   Net -1265 ml     GENERAL APPEARANCE: pleasant, NAD, alert  RESP: CTA B c good efforts  CV: RRR, nl S1/S2   ABDOMEN: morbid obesity  EXTREMITIES/SKIN: tr ble edema  ACCESS:  RIJ TDC - site ok, non-tender         Data:     CBC RESULTS:     Recent Labs   Lab 11/21/23  0759 11/20/23  0955 11/19/23  0653 11/18/23  0600 11/17/23  0654 11/16/23 2010   WBC 7.1 5.6 6.3 6.0 5.6 6.5   RBC 3.19* 3.15* 3.05* 2.88* 2.69* 2.97*   HGB 9.1* 8.9* 8.7* 8.2* 7.6* 8.6*   HCT 29.5* 29.8* 28.9* 26.9* 25.0* 26.8*    224 222 212 156 158     Basic Metabolic Panel:  Recent Labs   Lab 11/21/23  0759 11/21/23  0622 11/21/23  0215 11/20/23 2141 11/20/23  1729 11/20/23  0955 11/19/23  0839 11/19/23  0653 11/18/23  0853 11/18/23  0600 11/17/23  0917 11/17/23  0654 11/16/23 2127 11/16/23 2010     --   --   --   --  134*  --  138  --  138  --  134*  --  130*   POTASSIUM 3.8  --   --   --   --  3.5  --  3.9  --  4.1  --  4.4  --  4.6   CHLORIDE " 100  --   --   --   --  98  --  100  --  100  --  99  --  95*   CO2 24  --   --   --   --  25  --  27  --  29  --  22  --  22   BUN 41.1*  --   --   --   --  34.1*  --  30.2*  --  23.9*  --  44.8*  --  40.9*   CR 4.62*  --   --   --   --  4.50*  --  4.22*  --  3.32*  --  4.64*  --  4.31*   * 125* 105* 117* 128* 167*  167*   < > 116*  116*   < > 108*   < > 120*   < > 152*   TERESA 9.1  --   --   --   --  9.0  --  9.0  --  8.7*  --  8.5*  --  8.7*    < > = values in this interval not displayed.     INRNo lab results found in last 7 days.   Attestation:   I have reviewed today's relevant vital signs, notes, medications, labs and imaging.    Wade Fish MD  [facility] - Nephrology  [phone]

## 2023-11-21 NOTE — PLAN OF CARE
Goal Outcome Evaluation:    Pt A&Ox4, forgetful, VSS on 1L O2 while sleeping. Turn and repo, incontinent of urine but not fully emptying bladder. Bladder protocol followed, straight cath x1 this shift, PVR <50. Up with assist of 2 with vaughn steady, BLE trace edema. Continue with plan of care.

## 2023-11-21 NOTE — PROGRESS NOTES
[facility]    Hospitalist Progress Note    Interval History   States she did not do too well with PT today  Appetite is OK  No N/V, no abd pain  Denies chest pain, no SOB  Has urinary incontinence and some retention issues, needed to be straight cath few times in the last few days  Discussed with Dr Fish, RN and CC.    Assessment & Plan   Summary: Mya Hui is a 83 year old female with PMH Scoliosis, Lumbar radiculopathy, HTN, HLP, DM2, Hypothyroidism, CKD stage 3, Morbid obesity, GERD, Sliding hiatal hernia, Overactive bladder, OA, Chronic right shoulder pain/rotator cuff arthropathy, Migraines, Asymptomatic carotid artery stenosis who was admitted on 10/25/2023 with an elective T11 to pelvis robotic decompression and fusion.  Surgery was performed on 10/25/23 under general anesthesia.  Surgery was complicated with development of arrhythmia with significant hypotension and noted 3.5 L blood loss per Op Note and Anesthesia postprocedure evaluation note.  Patient received IV fluids, 6 units of pRBCs and 2 units of FFP intra-op.  ST changes were noted intraoperatively.  Patient remained intubated and started on Levophed and was transferred to the ICU due to hemorrhagic shock.  She required vasopressor support with Levophed until 10/27. Transferred out of the ICU with hospitalist comanging since 11/03    Post-op hemorrhagic shock, resolved  Shocked liver-resolved  Type 2 MI due to hemorrhagic shock, improved  Surgery was complicated with development of arrhythmia with significant hypotension and noted 3.5 L blood loss per Op Note and Anesthesia postprocedure evaluation note.  Patient received IV fluids, 6 units of pRBCs and 2 units of FFP intra-op. Patient received additional 2 units FFP, 10 units of cryo and 1 unit of PLT while in the ICU. LFTs continue to improve 11/5-11/7.  - doing fine  - PT/OT rec ARU    Encephalopathy, multifactorial, improving  Incidental finding of left basal  ganglia restricted diffusion  Patient was noted to be encephalopathic for which Vascular Neurology was consulted.  Brain MRI was completed and noted a left basal ganglia small focus of restricted diffusion but not a cause for encephalopathy and felt it was likely multifactorial.  EEG monitor was completed and negative and subsequently stopped.  Suspect due to uremic encephalopathy and sepsis.   Encephalopathy improved 11/6  Delirium prevention:   - Reorient patient at each interaction.  - Keep room lights on and blinds open during day (8am-8pm), low lights 8pm-8am.  - Minimize interruptions of sleep at night (consolidate vitals, nursing assessments, medications).  - Avoid sedating medications as able.    - Vascular Neurology recommended repeat brain MRI in 2-3 months.      LISA/ATN requiring hemodialysis  Anion gap metabolic acidosis due to above  CKD stage 3a  Hypoalbuminemia  Anasarca  Baseline creatinine between 1.1-1.4 with GFR in the 40-50's.   Due to development of LISA-D Nephrology was consulted and tunnel dialysis catheter placed 10/30 and has been undergoing HD Mon, Wed, Fri (last session was today, 11/17/2023)  - no HD on Monday, no plan for HD today, 11/21, as per Nephrology  - she is making urine, UO improved; has some issues with urinary retention, needed to be straight cath few times in the last few days  - place Spicer catheter for now to better assess UO  - BMP in am  - she is showing potential for renal recovery    Anemia  Hb stable ~8 since her initial hemorrhage. 11/17- Hb lower at 7.6 No bleeding noted. Nephrology planning to give EPO   - Hb now  8.2--8.7  - monitor with intermittent labs    Aspiration/ventilator associated PNA (Enterobacter, stenotrophomonas and E. Coli)  Acute hypoxic respiratory failure resolved  Leukocytosis due to above  Extubated 11/2. Patient has also developed aspiration/ventilator associated pneumonia with sputum Cx growing Enterobacter, stenotrophomonas and E. coli and was  initially started on Zoysn and switched to Meropenem on 11/1/2023.  Currently continued on Meropenem and awaiting final cultures. Procal 3.   - completed 14 days course of antibiotics 11/14  - Encourage use of IS/Flutter valve.      Dysphagia-improving  Moderate malnutrition  Failed SLP assessment. Bedside feeding tube placement coiling due to hiatal hernia. Started PPN     - improving swallow function. SLP does not recommend pursing PEG.   - continue modified diet per SLP with supplements; currently on DD 5 with slightly thick liquids  - ARU vs TCU on discharge for continue SLP rehab    Scoliosis  Lumbar radiculopathy  S/p T11 to pelvis robotic decompression and fusion  - Neurosurgery is managing.  - PT/OT  - plan to discharge to ARU    HTN  NSVT  - continue diltiazem  mg po daily and hydralazine 10 mg po q8h   - holding PTA lisinopril for ATN    HLP  - Hold PTA pravastatin 40 mg/d to reduce pill burden     Type 2 DM, controlled  Stress induced hyperglycemia  - no insulin needs recently, possible due to antibiotic use  - consider reduction in glucose monitoring    Hypothyroidism   - continue PTA synthroid     GERD  Sliding hiatal hernia  - PTA PPI    OA  Chronic right shoulder pain/rotator cuff arthropathy  Chronic Pain  - Pain/analgesic management per Neurosurgery.      Obesity   Suspected ANAYELI  Body mass index is 37.64 kg/m .  Increase in all-cause morbidity and mortality.   - Follow up with PCP regarding ongoing management.    - Monitor O2 saturations.    - Recommend outpatient sleep study.      Overactive bladder  - Holding PTA Ditropan XL 10 mg/d.      Migraines: Pain/analgesic management per Neurosurgery.      Asymptomatic carotid artery stenosis.    Hypomagnesemia   - RN replacement protocols    Clinically Significant Risk Factors              # Hypoalbuminemia: Lowest albumin = 2.5 g/dL at 11/1/2023  4:19 AM, will monitor as appropriate     # Hypertension: Noted on problem list        # Obesity:  "Estimated body mass index is 36.32 kg/m  as calculated from the following:    Height as of this encounter: 1.651 m (5' 5\").    Weight as of this encounter: 99 kg (218 lb 4.1 oz).   # Moderate Malnutrition: based on nutrition assessment      # Financial/Environmental Concerns: none            Diet: Snacks/Supplements Adult: Other; Magic cup w/ breakfast; Nepro w/ lunch; Gelatein w/ dinner; With Meals  Combination Diet Minced and Moist Diet (level 5); Slightly Thick (level 1)    DVT Prophylaxis: Heparin SQ  Spicer Catheter: Not present  Lines: PRESENT      CVC Double Lumen Right Internal jugular Non - valved (open ended);Tunneled-Site Assessment: WDL      Cardiac Monitoring: None  Code Status: Full Code    Disposition: ARU assessing    Roxanna Xiao MD  Hospitalist Service  [facility]  Securely message with [employer] (more info)  Text page via [employer] Paging/Directory     Data reviewed today: I reviewed all new labs and imaging results over the last 24 hours.     Medical Decision Making       40 MINUTES SPENT BY ME on the date of service doing chart review, history, exam, documentation & further activities per the note.       Physical Exam   Temp: 97.3  F (36.3  C) Temp src: Oral BP: 125/72 Pulse: 82   Resp: 18 SpO2: 98 % O2 Device: None (Room air)    Vitals:    11/18/23 0500 11/19/23 0500 11/20/23 0251   Weight: 95 kg (209 lb 7 oz) 96 kg (211 lb 10.3 oz) 99 kg (218 lb 4.1 oz)     Vital Signs with Ranges  Temp:  [97.3  F (36.3  C)-98.8  F (37.1  C)] 97.3  F (36.3  C)  Pulse:  [] 82  Resp:  [18-19] 18  BP: (121-147)/(71-84) 125/72  SpO2:  [95 %-98 %] 98 %  I/O last 3 completed shifts:  In: 165 [P.O.:165]  Out: 2290 [Urine:2290]  O2 requirements: no    Constitutional: Elderly female, laying in bed  HEENT: Eyes nonicteric, oral mucosa moist  Cardiovascular: S1S2, RRR  Respiratory: bilateral air entry, no wheezing, no rales, no crackles  Vascular: Trace BLE pitting edema  GI: Normoactive " bowel sounds, appears nontender  Skin/Integumen: No rashes  Neuro:AAOX3, no FNDs  Psych- normal mood    Medications    dextrose          - MEDICATION INSTRUCTIONS for Dialysis Patients -   Does not apply See Admin Instructions    artificial saliva  2 spray Swish & Spit 4x Daily    B and C vitamin Complex with folic acid  5 mL Oral or Feeding Tube Daily    diltiazem ER COATED BEADS  180 mg Oral Daily    heparin ANTICOAGULANT  5,000 Units Subcutaneous Q8H    hydrALAZINE  10 mg Oral or Feeding Tube Q8H    levothyroxine  100 mcg Oral or NG Tube QAM AC    magnesium oxide  400 mg Oral Daily    nystatin   Topical BID    pantoprazole  40 mg Oral or Feeding Tube QAM AC    Or    pantoprazole  40 mg Intravenous QAM AC    polyethylene glycol  17 g Oral or NG Tube Daily    senna-docusate  1 tablet Oral or NG Tube BID    sodium chloride (PF)  3 mL Intracatheter Q8H    sodium chloride (PF)  9 mL Intracatheter During Dialysis/CRRT (from stock)    traZODone  25 mg Oral At Bedtime       Data   Recent Labs   Lab 11/21/23  0759 11/21/23  0622 11/21/23  0215 11/20/23  1729 11/20/23  0955 11/19/23  0839 11/19/23  0653   WBC 7.1  --   --   --  5.6  --  6.3   HGB 9.1*  --   --   --  8.9*  --  8.7*   MCV 93  --   --   --  95  --  95     --   --   --  224  --  222     --   --   --  134*  --  138   POTASSIUM 3.8  --   --   --  3.5  --  3.9   CHLORIDE 100  --   --   --  98  --  100   CO2 24  --   --   --  25  --  27   BUN 41.1*  --   --   --  34.1*  --  30.2*   CR 4.62*  --   --   --  4.50*  --  4.22*   ANIONGAP 14  --   --   --  11  --  11   TERESA 9.1  --   --   --  9.0  --  9.0   * 125* 105*   < > 167*  167*   < > 116*  116*   ALBUMIN 3.2*  --   --   --  3.3*  --  3.2*   PROTTOTAL  --   --   --   --  6.1*  --   --    BILITOTAL  --   --   --   --  0.4  --   --    ALKPHOS  --   --   --   --  127  --   --    ALT  --   --   --   --  17  --   --    AST  --   --   --   --  29  --   --     < > = values in this interval not  displayed.       Imaging:   No results found for this or any previous visit (from the past 24 hour(s)).

## 2023-11-21 NOTE — PROGRESS NOTES
Shift Summary [id number]    Admitting Diagnosis: Scoliosis of lumbar spine, unspecified scoliosis type [M41.9]  S/P lumbar spinal fusion [Z98.1]   Vitals :Hypertension.   Pain : denied pain during shift  A&Ox4. Forgetful.   Voiding : urinary retention. PVR > 500. Straight cathed during shift.   Mobility : sera steady Ax2.   Tele NA  CMS : WNL, except for trace edema in BLE.   Lung Sounds diminished  on room air.  v  GI : WNL.   Dressing : CVC site CDI.    Orders Placed This Encounter      Combination Diet Minced and Moist Diet (level 5); Slightly Thick (level 1)       Plan:     Dialysis was held today (please see Nephrology notes)  Discharge pending to ARU.

## 2023-11-21 NOTE — PROGRESS NOTES
Chart reviewed    Thoracic 11 to pelvis robotic decompression and fusion   -27 Days Post-Op     Chart reviewed no acute events overnight noted. SW coordinating placement.    Recommend ARU at discharge.

## 2023-11-22 ENCOUNTER — APPOINTMENT (OUTPATIENT)
Dept: PHYSICAL THERAPY | Facility: CLINIC | Age: 83
DRG: 453 | End: 2023-11-22
Attending: NEUROLOGICAL SURGERY
Payer: COMMERCIAL

## 2023-11-22 ENCOUNTER — APPOINTMENT (OUTPATIENT)
Dept: OCCUPATIONAL THERAPY | Facility: CLINIC | Age: 83
DRG: 453 | End: 2023-11-22
Attending: NEUROLOGICAL SURGERY
Payer: COMMERCIAL

## 2023-11-22 LAB
ALBUMIN SERPL BCG-MCNC: 3.3 G/DL (ref 3.5–5.2)
ANION GAP SERPL CALCULATED.3IONS-SCNC: 12 MMOL/L (ref 7–15)
BUN SERPL-MCNC: 43.7 MG/DL (ref 8–23)
CALCIUM SERPL-MCNC: 9.2 MG/DL (ref 8.8–10.2)
CHLORIDE SERPL-SCNC: 99 MMOL/L (ref 98–107)
CREAT SERPL-MCNC: 4.4 MG/DL (ref 0.51–0.95)
DEPRECATED HCO3 PLAS-SCNC: 25 MMOL/L (ref 22–29)
EGFRCR SERPLBLD CKD-EPI 2021: 10 ML/MIN/1.73M2
ERYTHROCYTE [DISTWIDTH] IN BLOOD BY AUTOMATED COUNT: 16.4 % (ref 10–15)
GLUCOSE BLDC GLUCOMTR-MCNC: 100 MG/DL (ref 70–99)
GLUCOSE BLDC GLUCOMTR-MCNC: 107 MG/DL (ref 70–99)
GLUCOSE BLDC GLUCOMTR-MCNC: 116 MG/DL (ref 70–99)
GLUCOSE BLDC GLUCOMTR-MCNC: 162 MG/DL (ref 70–99)
GLUCOSE SERPL-MCNC: 109 MG/DL (ref 70–99)
HCT VFR BLD AUTO: 28.8 % (ref 35–47)
HGB BLD-MCNC: 8.5 G/DL (ref 11.7–15.7)
MCH RBC QN AUTO: 28.1 PG (ref 26.5–33)
MCHC RBC AUTO-ENTMCNC: 29.5 G/DL (ref 31.5–36.5)
MCV RBC AUTO: 95 FL (ref 78–100)
PHOSPHATE SERPL-MCNC: 4.3 MG/DL (ref 2.5–4.5)
PLATELET # BLD AUTO: 264 10E3/UL (ref 150–450)
POTASSIUM SERPL-SCNC: 3.5 MMOL/L (ref 3.4–5.3)
RBC # BLD AUTO: 3.02 10E6/UL (ref 3.8–5.2)
SODIUM SERPL-SCNC: 136 MMOL/L (ref 135–145)
WBC # BLD AUTO: 6.3 10E3/UL (ref 4–11)

## 2023-11-22 PROCEDURE — 99207 PR CDG-CUT & PASTE-POTENTIAL IMPACT ON LEVEL: CPT | Performed by: INTERNAL MEDICINE

## 2023-11-22 PROCEDURE — C9113 INJ PANTOPRAZOLE SODIUM, VIA: HCPCS | Mod: JZ | Performed by: NEUROLOGICAL SURGERY

## 2023-11-22 PROCEDURE — 250N000011 HC RX IP 250 OP 636: Mod: JZ | Performed by: NEUROLOGICAL SURGERY

## 2023-11-22 PROCEDURE — 99232 SBSQ HOSP IP/OBS MODERATE 35: CPT | Performed by: INTERNAL MEDICINE

## 2023-11-22 PROCEDURE — 82040 ASSAY OF SERUM ALBUMIN: CPT | Performed by: INTERNAL MEDICINE

## 2023-11-22 PROCEDURE — 85027 COMPLETE CBC AUTOMATED: CPT | Performed by: INTERNAL MEDICINE

## 2023-11-22 PROCEDURE — 250N000011 HC RX IP 250 OP 636: Mod: JZ | Performed by: INTERNAL MEDICINE

## 2023-11-22 PROCEDURE — 250N000013 HC RX MED GY IP 250 OP 250 PS 637: Performed by: HOSPITALIST

## 2023-11-22 PROCEDURE — 250N000013 HC RX MED GY IP 250 OP 250 PS 637: Performed by: INTERNAL MEDICINE

## 2023-11-22 PROCEDURE — 97530 THERAPEUTIC ACTIVITIES: CPT | Mod: GO

## 2023-11-22 PROCEDURE — 97116 GAIT TRAINING THERAPY: CPT | Mod: GP

## 2023-11-22 PROCEDURE — 99233 SBSQ HOSP IP/OBS HIGH 50: CPT | Performed by: INTERNAL MEDICINE

## 2023-11-22 PROCEDURE — 36415 COLL VENOUS BLD VENIPUNCTURE: CPT | Performed by: INTERNAL MEDICINE

## 2023-11-22 PROCEDURE — 250N000013 HC RX MED GY IP 250 OP 250 PS 637: Performed by: STUDENT IN AN ORGANIZED HEALTH CARE EDUCATION/TRAINING PROGRAM

## 2023-11-22 PROCEDURE — 120N000001 HC R&B MED SURG/OB

## 2023-11-22 PROCEDURE — 250N000013 HC RX MED GY IP 250 OP 250 PS 637: Performed by: NURSE PRACTITIONER

## 2023-11-22 PROCEDURE — 97530 THERAPEUTIC ACTIVITIES: CPT | Mod: GP

## 2023-11-22 PROCEDURE — 97535 SELF CARE MNGMENT TRAINING: CPT | Mod: GO

## 2023-11-22 RX ADMIN — Medication 2 SPRAY: at 17:13

## 2023-11-22 RX ADMIN — HYDRALAZINE HYDROCHLORIDE 10 MG: 10 TABLET ORAL at 22:09

## 2023-11-22 RX ADMIN — NYSTATIN: 100000 CREAM TOPICAL at 22:06

## 2023-11-22 RX ADMIN — Medication 2 SPRAY: at 22:07

## 2023-11-22 RX ADMIN — HYDRALAZINE HYDROCHLORIDE 10 MG: 10 TABLET ORAL at 06:41

## 2023-11-22 RX ADMIN — HYDRALAZINE HYDROCHLORIDE 10 MG: 10 TABLET ORAL at 14:53

## 2023-11-22 RX ADMIN — SENNOSIDES AND DOCUSATE SODIUM 1 TABLET: 8.6; 5 TABLET ORAL at 22:09

## 2023-11-22 RX ADMIN — HEPARIN SODIUM 5000 UNITS: 5000 INJECTION, SOLUTION INTRAVENOUS; SUBCUTANEOUS at 17:03

## 2023-11-22 RX ADMIN — HEPARIN SODIUM 5000 UNITS: 5000 INJECTION, SOLUTION INTRAVENOUS; SUBCUTANEOUS at 08:44

## 2023-11-22 RX ADMIN — SENNOSIDES AND DOCUSATE SODIUM 1 TABLET: 8.6; 5 TABLET ORAL at 08:43

## 2023-11-22 RX ADMIN — Medication 2 SPRAY: at 13:00

## 2023-11-22 RX ADMIN — Medication 2 SPRAY: at 10:44

## 2023-11-22 RX ADMIN — HEPARIN SODIUM 5000 UNITS: 5000 INJECTION, SOLUTION INTRAVENOUS; SUBCUTANEOUS at 01:14

## 2023-11-22 RX ADMIN — DILTIAZEM HYDROCHLORIDE 180 MG: 180 CAPSULE, COATED, EXTENDED RELEASE ORAL at 08:43

## 2023-11-22 RX ADMIN — Medication 400 MG: at 08:43

## 2023-11-22 RX ADMIN — NYSTATIN: 100000 CREAM TOPICAL at 12:58

## 2023-11-22 RX ADMIN — LEVOTHYROXINE SODIUM 100 MCG: 100 TABLET ORAL at 06:41

## 2023-11-22 RX ADMIN — PANTOPRAZOLE SODIUM 40 MG: 40 INJECTION, POWDER, FOR SOLUTION INTRAVENOUS at 06:41

## 2023-11-22 RX ADMIN — TRAZODONE HYDROCHLORIDE 25 MG: 50 TABLET ORAL at 22:09

## 2023-11-22 RX ADMIN — Medication 5 ML: at 17:13

## 2023-11-22 ASSESSMENT — ACTIVITIES OF DAILY LIVING (ADL)
ADLS_ACUITY_SCORE: 35
ADLS_ACUITY_SCORE: 35
ADLS_ACUITY_SCORE: 38
ADLS_ACUITY_SCORE: 35
ADLS_ACUITY_SCORE: 38
ADLS_ACUITY_SCORE: 35

## 2023-11-22 NOTE — PROGRESS NOTES
Notified provider about indwelling sykes catheter discussed removal or continued need.    Did provider choose to remove indwelling sykes catheter? NO    Provider's sykes indication for keeping indwelling sykes catheter: Indication for continued use: Retention    Is there an order for indwelling sykes catheter? YES    *If there is a plan to keep sykes catheter in place at discharge daily notification with provider is not necessary, but please add a notation in the treatment team sticky note that the patient will be discharging with the catheter.

## 2023-11-22 NOTE — PROGRESS NOTES
Care Management Follow Up    Length of Stay (days): 28    Expected Discharge Date: 11/22/2023     Concerns to be Addressed: discharge planning     Patient plan of care discussed at interdisciplinary rounds: No    Anticipated Discharge Disposition: Home, Home Care, Dialysis Services, Skilled Nursing Facility, Transitional Care     Anticipated Discharge Services: Transportation Services  Anticipated Discharge DME: Other (see comment) (to be determined)    Patient/family educated on Medicare website which has current facility and service quality ratings:    Education Provided on the Discharge Plan: Yes  Patient/Family in Agreement with the Plan: unable to assess (discussed mutiple discharge choices)    Referrals Placed by CM/SW: External Care Coordination, Specialty Providers  Private pay costs discussed: Not applicable    Additional Information:  Writer placed phone calls to :  -Kathi with [facility]. Writer is informed they will review pt and call back to writer.    - Fernanda with [facility] who states will review pt and call writer back.     - Shanelle [facility]. No answer. Writer left voicemail asking for call back/    -[facility]. No answer. Writer left voicemail asking for call back.    - [facility]. Writer awaiting response.     Addendum: Writer spoke with pt's daughter April. Discussed awaiting to hear back from ARU. Discussed if no acceptance from ARU's about maybe sending referrals to TCU. April states that she wants to talk to doctor about what is medically occurring with pt including dialysis as this will determine whether ARU or TCU preference. Writer discussed with April about fredi griffin question about 24/7 care after rehab. April states this all depends on how pt is doing after rehab. April states that if pt is doing well enough to go home family can provide some support, if pt is needing more care there is discussion going on about April maybe bringing her home and caring for her at April's home, and  they have also in the past discussed AL's and toured them. April states that she cannot really state what the long term discharge plan may be after rehab until she sees how pt does at rehab. Writer states understanding. April wanting to talk to doctor and PT. Writer informed her that writer will send them both messages asking them to touch base with her. April states understanding.      Writer updated Dr. Omer to call April. Doctor agreeable. Writer updated PT to call pt's daughter April.    [phone].    Writer received phone call from brian with [facility] stating that they have interest in pt wanting call back. Brian wanting to know if [facility] is preference.     Writer called pt's daughter April. [facility]     Writer placed phone call to [facility]. No answer. Writer left voicemail asking for call back.     Triny Porras, BSW  Social Work  [facility]

## 2023-11-22 NOTE — PROGRESS NOTES
[facility]     Renal Progress Note       SHORTHAND KEY FOR MY NOTES:  c = with, s = without, p = after, a = before, x = except, asx = asymptomatic, tx = transplant or treatment, sx = symptoms or symptomatic, cx = canceled or culture, rxn = reaction, yday = yesterday, nl = normal, abx = antibiotics, fxn = function, dx = diagnosis, dz = disease, m/h = melena/hematochezia, c/d/l/ha = cramping/dizziness/lightheadedness/headache, d/c = discharge or diarrhea/constipation, f/c/n/v = fevers/chills/nausea/vomiting, cp/sob = chest pain/shortness of breath, tbv = total body volume, rxn = reaction, tdc = tunneled dialysis catheter, pta = prior to admission, hd = hemodialysis, pd = peritoneal dialysis, hhd = home hemodialysis, edw = estimated dry wt         Assessment/Plan:     1.  Severe LISA/CKD IIIa.  Pt's cr is slightly better.  Good uo via sykes.  No need for dialysis again today.  A.  Chemistries are fine and no uremic sx.  TBV is good.  B.  Follow labs, uo, sx.  Daily assessment for HD needs.  C.  Avoid nephrotoxics.  D.  Keep sykes in place due to urinary retention.    2.  Anemia. Pt's hb is stable.    A.  Follow hb, clinically.    3.  FEN.  Electrolytes are good.      A.  Follow electrolytes daily.    Case d/w Dr. Omer.        Interval History:     Pt feels ok.  No uremic sx.  Breathing ok.  Good uo.          Medications and Allergies:      - MEDICATION INSTRUCTIONS for Dialysis Patients -   Does not apply See Admin Instructions    artificial saliva  2 spray Swish & Spit 4x Daily    B and C vitamin Complex with folic acid  5 mL Oral or Feeding Tube Daily    diltiazem ER COATED BEADS  180 mg Oral Daily    heparin ANTICOAGULANT  5,000 Units Subcutaneous Q8H    hydrALAZINE  10 mg Oral or Feeding Tube Q8H    levothyroxine  100 mcg Oral or NG Tube QAM AC    magnesium oxide  400 mg Oral Daily    nystatin   Topical BID    pantoprazole  40 mg Oral or Feeding Tube QAM AC    Or    pantoprazole  40 mg  "Intravenous QAM AC    polyethylene glycol  17 g Oral or NG Tube Daily    senna-docusate  1 tablet Oral or NG Tube BID    sodium chloride (PF)  3 mL Intracatheter Q8H    sodium chloride (PF)  9 mL Intracatheter During Dialysis/CRRT (from stock)    traZODone  25 mg Oral At Bedtime     Allergies   Allergen Reactions    Fentanyl Nausea and Vomiting     VERY sensitive to most narcotics if not all.    Morphine And Related Nausea          Physical Exam:     Vitals were reviewed     , Blood pressure (!) 159/77, pulse 87, temperature 97.6  F (36.4  C), temperature source Oral, resp. rate 18, height 1.651 m (5' 5\"), weight 99 kg (218 lb 4.1 oz), SpO2 98%.  Wt Readings from Last 3 Encounters:   11/20/23 99 kg (218 lb 4.1 oz)   10/19/23 95 kg (209 lb 8 oz)   09/28/23 97.1 kg (214 lb)     Intake/Output Summary (Last 24 hours) at 11/22/2023 1222  Last data filed at 11/22/2023 0630  Gross per 24 hour   Intake 440 ml   Output 1300 ml   Net -860 ml     GENERAL APPEARANCE: pleasant, NAD, alert  RESP: CTA B c good efforts  CV: RRR, nl S1/S2   ABDOMEN: morbid obesity  EXTREMITIES/SKIN: tr ble edema  ACCESS:  RIJ TDC - site ok, non-tender  OTHER:  + sykes c yellow urine         Data:     CBC RESULTS:     Recent Labs   Lab 11/22/23  0740 11/21/23  0759 11/20/23  0955 11/19/23  0653 11/18/23  0600 11/17/23  0654   WBC 6.3 7.1 5.6 6.3 6.0 5.6   RBC 3.02* 3.19* 3.15* 3.05* 2.88* 2.69*   HGB 8.5* 9.1* 8.9* 8.7* 8.2* 7.6*   HCT 28.8* 29.5* 29.8* 28.9* 26.9* 25.0*    254 224 222 212 156     Basic Metabolic Panel:  Recent Labs   Lab 11/22/23  0805 11/22/23  0740 11/21/23  2107 11/21/23  0759 11/21/23  0622 11/21/23  0215 11/20/23  1729 11/20/23  0955 11/19/23  0839 11/19/23  0653 11/18/23  0853 11/18/23  0600 11/17/23  0917 11/17/23  0654   NA  --  136  --  138  --   --   --  134*  --  138  --  138  --  134*   POTASSIUM  --  3.5  --  3.8  --   --   --  3.5  --  3.9  --  4.1  --  4.4   CHLORIDE  --  99  --  100  --   --   --  98  --  100  " --  100  --  99   CO2  --  25  --  24  --   --   --  25  --  27  --  29  --  22   BUN  --  43.7*  --  41.1*  --   --   --  34.1*  --  30.2*  --  23.9*  --  44.8*   CR  --  4.40*  --  4.62*  --   --   --  4.50*  --  4.22*  --  3.32*  --  4.64*   * 109* 166* 119* 125* 105*   < > 167*  167*   < > 116*  116*   < > 108*   < > 120*   TERESA  --  9.2  --  9.1  --   --   --  9.0  --  9.0  --  8.7*  --  8.5*    < > = values in this interval not displayed.     INRNo lab results found in last 7 days.   Attestation:   I have reviewed today's relevant vital signs, notes, medications, labs and imaging.    Wade Fish MD  [facility] - Nephrology  [phone]

## 2023-11-22 NOTE — PROGRESS NOTES
[facility]  Neurosurgery Daily Progress Note    Assessment & Plan   Procedure(s):  Thoracic 11 to pelvis robotic decompression and fusion   -28 Days Post-Op  Patient was seen sitting in bed, wearing brace, working with PT. She reports intermittent back pain. Pain medications have been helpful. Neuro exam stable. Incision CDI. Patient denies any new or worsening symptoms.    Plan:  - Discharge pending ARU placement, SW following   - Continue PT/OT, wear brace when out of bed   - Pain control measures as needed   - Routine wound care   - Follow up with NSGY clinic at 6 weeks post op with xray prior    - Appreciate assistance from specialties        Discussed with Dr. Mariann Mccullough, CNP  [facility] Neurosurgery  [facility]  [address] [place] [id number]  Tel [phone]  Pager [phone]    Interval History   Improving     Physical Exam   Temp: 98.2  F (36.8  C) Temp src: Oral BP: 124/74 Pulse: 82   Resp: 18 SpO2: 97 % O2 Device: None (Room air)    Vitals:    11/18/23 0500 11/19/23 0500 11/20/23 0251   Weight: 95 kg (209 lb 7 oz) 96 kg (211 lb 10.3 oz) 99 kg (218 lb 4.1 oz)       Mental status:  Alert and oriented x 3, speech is fluent.  Motor:  Moves all extremities with appropriate strength.   Sensation:  Intact to light touch   Incision: CDI, healing well

## 2023-11-22 NOTE — PROGRESS NOTES
[facility]    Hospitalist Progress Note    Date of Service (when I saw the patient): 11/22/2023  Admit date: 10/25/2023    Interval History   Full details of events over last 24 hours outlined below.   Patient states she continues to have difficulty with walking but this is improving slowly.  Spicer catheter was placed yesterday for urinary retention, which started postsurgery and she has had excellent urine output with over 1000 cc out so far today.   Denies any SOB, CP, abdominal pain, N/V/D.    Assessment & Plan   Mya Hui is a 83 year old female with PMH Scoliosis, Lumbar radiculopathy, HTN, HLP, DM2, Hypothyroidism, CKD stage 3, Morbid obesity, GERD, Sliding hiatal hernia, Overactive bladder, OA, Chronic right shoulder pain/rotator cuff arthropathy, Migraines, Asymptomatic carotid artery stenosis who was admitted on 10/25/2023 with an elective T11 to pelvis robotic decompression and fusion.    Surgery was performed on 10/25/23 under general anesthesia.  Surgery was complicated with development of arrhythmia with significant hypotension and noted 3.5 L blood loss per Op Note and Anesthesia postprocedure evaluation note.  Patient received IV fluids, 6 units of pRBCs and 2 units of FFP intra-op.  ST changes were noted intraoperatively.  Patient remained intubated and started on Levophed and was transferred to the ICU due to hemorrhagic shock.  She required vasopressor support with Levophed until 10/27. Transferred out of the ICU with hospitalist comanging since 11/03     S/p elective T11 to pelvis robotic decompression and fusion on 10/25  Acute blood loss anemia with post-op hemorrhagic shock, resolved  Shocked liver-resolved  Type 2 MI due to hemorrhagic shock, improved  Surgery was complicated with development of arrhythmia with significant hypotension and noted 3.5 L blood loss per Op Note and Anesthesia postprocedure evaluation note. Patient received IV fluids, 6 units of  pRBCs and 2 units of FFP intra-op. Patient received additional 2 units FFP, 10 units of cryo and 1 unit of PLT while in the ICU. LFTs continue to improve 11/5-11/7.  Doing fine  PT/OT rec ARU     Encephalopathy, multifactorial, improving  Incidental finding of left basal ganglia restricted diffusion  Patient was noted to be encephalopathic for which Vascular Neurology was consulted.   * Brain MRI was completed and noted a left basal ganglia small focus of restricted diffusion but not a cause for encephalopathy and felt it was likely multifactorial.    * EEG monitor was completed and negative and subsequently stopped.    * Suspect due to uremic encephalopathy and sepsis.  * Encephalopathy improved 11/6    Delirium prevention:  Reorient patient at each interaction.  Keep room lights on and blinds open during day (8am-8pm), low lights 8pm-8am.  Minimize interruptions of sleep at night (consolidate vitals, nursing assessments, medications).  Avoid sedating medications as able.    Vascular Neurology recommended repeat brain MRI in 2-3 months     Severe LISA secondary to ATN S/p hemodialysis,   Anion gap metabolic acidosis due to above  CKD stage 3a  Hypoalbuminemia  Anasarca  Baseline creatinine between 1.1-1.4 with GFR in the 40-50's.   Due to development of LISA-D Nephrology was consulted and tunnel dialysis catheter placed 10/30 and has been undergoing HD Mon, Wed, Friday  Last hemodialysis was 11/17  Spicer placed for urinary retention.  New since surgery. Likely related to medications and decreased mobility.   Continue Spicer until more mobile with urology follow-up and voiding trial.     Recent Labs   Lab 11/22/23  0740 11/21/23  0759 11/20/23  0955 11/19/23  0653 11/18/23  0600 11/17/23  0654   CR 4.40* 4.62* 4.50* 4.22* 3.32* 4.64*       Anemia  Hb stable ~8 since her initial hemorrhage. 11/17- Hb lower at 7.6 No bleeding noted. Nephrology planning to give EPO   Hb now  8.2--8.7  monitor with intermittent  labs    Recent Labs   Lab 11/22/23  0740 11/21/23  0759 11/20/23  0955 11/19/23  0653 11/18/23  0600 11/17/23  0654   HGB 8.5* 9.1* 8.9* 8.7* 8.2* 7.6*       Aspiration/ventilator associated PNA (Enterobacter, stenotrophomonas and E. Coli)  Acute hypoxic respiratory failure RESOLIVED  Leukocytosis due to above  Extubated 11/2. Patient has also developed aspiration/ventilator associated pneumonia with sputum Cx growing Enterobacter, stenotrophomonas and E. coli and was initially started on Zoysn and switched to Meropenem on 11/1/2023.  Currently continued on Meropenem and awaiting final cultures. Procal 3.   * completed 14 days course of antibiotics 11/14  Encourage use of IS/Flutter valve.       Dysphagia-improving  Moderate malnutrition  Failed SLP assessment. Bedside feeding tube placement coiling due to hiatal hernia. Started PPN     Dysphagia diet per speech  Nutrition consult appreciated on supplements.     Scoliosis  Lumbar radiculopathy  S/p T11 to pelvis robotic decompression and fusion  Neurosurgery is managing.  PT/OT  Plan to discharge to ARU     HTN  NSVT  Continue diltiazem  mg po daily and hydralazine 10 mg po q8h   Holding PTA lisinopril for ATN     HLP  Holding PTA pravastatin 40 mg/d to reduce pill burden      Type 2 DM, controlled, A1c 6.3 on 10/25  Stress induced hyperglycemia  - no insulin needs recently, possible due to antibiotic use  - consider reduction in glucose monitoring    Recent Labs   Lab 11/22/23  0805 11/22/23  0740 11/21/23  2107 11/21/23  0759 11/21/23  0622 11/21/23  0215   * 109* 166* 119* 125* 105*          Hypothyroidism   - continue PTA synthroid      GERD  Sliding hiatal hernia  - PTA PPI     OA  Chronic right shoulder pain/rotator cuff arthropathy  Chronic Pain  - Pain/analgesic management per Neurosurgery.       Obesity   Suspected ANAYELI  Body mass index is 37.64 kg/m .  Increase in all-cause morbidity and mortality.   - Follow up with PCP regarding ongoing  "management.    - Monitor O2 saturations.    - Recommend outpatient sleep study.       Overactive bladder  - Holding PTA Ditropan XL 10 mg/d.       Migraines: Pain/analgesic management per Neurosurgery.       Asymptomatic carotid artery stenosis.     Hypomagnesemia   - RN replacement protocols  Recent Labs   Lab 11/22/23  0740 11/21/23  0759 11/20/23  0955 11/19/23  0653 11/18/23  0600 11/17/23  0654 11/16/23 2010   POTASSIUM 3.5 3.8 3.5 3.9 4.1 4.4 4.6   MAG  --   --  1.9  --   --   --   --    PHOS 4.3 4.6* 4.0 4.8* 4.2 6.0* 5.6*       Clinically Significant Risk Factors              # Hypoalbuminemia: Lowest albumin = 2.5 g/dL at 11/1/2023  4:19 AM, will monitor as appropriate     # Hypertension: Noted on problem list        # Obesity: Estimated body mass index is 36.32 kg/m  as calculated from the following:    Height as of this encounter: 1.651 m (5' 5\").    Weight as of this encounter: 99 kg (218 lb 4.1 oz).   # Moderate Malnutrition: based on nutrition assessment    # Financial/Environmental Concerns: none           PT/OT: Recommending ARU  Diet: Orders Placed This Encounter      Combination Diet Minced and Moist Diet (level 5); Slightly Thick (level 1)     IVF:   DVT Prophylaxis: Heparin subcu  Spicer Catheter: PRESENT, indication: Strict 1-2 Hour I&O    Lines: Tunneled right IJ line for dialysis, midline left brachial  Cardiac Monitoring: None   Full Code  Disposition: Anticipated discharge to TCU pending MA  Communication: Discussed with RN, nephrology and RN on 11/22/23    Lauren Omer MD    Hospitalist Service  [facility]  Securely message with the Vocera Web Console (learn more here)  Text page via [employer] Paging/Directory      -Data reviewed today: I reviewed all new labs and imaging results over the last 24 hours. I personally reviewed no images or EKG's today.    Physical Exam   Temp: 98.2  F (36.8  C) Temp src: Oral BP: 124/74 Pulse: 82   Resp: 18 SpO2: 97 % O2 Device: None " (Room air)    Vitals:    11/18/23 0500 11/19/23 0500 11/20/23 0251   Weight: 95 kg (209 lb 7 oz) 96 kg (211 lb 10.3 oz) 99 kg (218 lb 4.1 oz)     Vital Signs with Ranges  Temp:  [97.3  F (36.3  C)-98.8  F (37.1  C)] 98.2  F (36.8  C)  Pulse:  [59-98] 82  Resp:  [17-18] 18  BP: (121-133)/(70-78) 124/74  SpO2:  [95 %-98 %] 97 %  I/O last 3 completed shifts:  In: 440 [P.O.:440]  Out: 1300 [Urine:1300]    Today's Exam  Constitutional:  NAD,   Neuropsyche: alert and oriented, answers questions appropriately.   Respiratory:  Breathing comfortably, good air exchange, no wheezes, no crackles.   Cardiovascular:  Regular rate and rhythm, trace edema.  GI: soft, NT/ND, BS normal  Skin/Integumen:  No acute rash or sign of bleeding.     Medications   All medications reviewed on 11/22/23     dextrose        - MEDICATION INSTRUCTIONS for Dialysis Patients -   Does not apply See Admin Instructions    artificial saliva  2 spray Swish & Spit 4x Daily    B and C vitamin Complex with folic acid  5 mL Oral or Feeding Tube Daily    diltiazem ER COATED BEADS  180 mg Oral Daily    heparin ANTICOAGULANT  5,000 Units Subcutaneous Q8H    hydrALAZINE  10 mg Oral or Feeding Tube Q8H    levothyroxine  100 mcg Oral or NG Tube QAM AC    magnesium oxide  400 mg Oral Daily    nystatin   Topical BID    pantoprazole  40 mg Oral or Feeding Tube QAM AC    Or    pantoprazole  40 mg Intravenous QAM AC    polyethylene glycol  17 g Oral or NG Tube Daily    senna-docusate  1 tablet Oral or NG Tube BID    sodium chloride (PF)  3 mL Intracatheter Q8H    sodium chloride (PF)  9 mL Intracatheter During Dialysis/CRRT (from stock)    traZODone  25 mg Oral At Bedtime     PRN Meds: acetaminophen, bisacodyl, calcium carbonate, dextrose, glucose **OR** dextrose **OR** glucagon, hydrOXYzine, ipratropium - albuterol 0.5 mg/2.5 mg/3 mL, labetalol, lidocaine 4%, lidocaine (buffered or not buffered), magnesium hydroxide, melatonin, miconazole, naloxone **OR** naloxone  **OR** naloxone **OR** naloxone, nitroGLYcerin, ondansetron **OR** ondansetron, oxyCODONE IR **OR** oxyCODONE, prochlorperazine **OR** prochlorperazine, sodium chloride (PF), sodium chloride 0.9%    Data   Recent Labs   Lab 11/22/23  0805 11/22/23  0740 11/21/23  2107 11/21/23  0759 11/20/23  1729 11/20/23  0955   WBC  --  6.3  --  7.1  --  5.6   HGB  --  8.5*  --  9.1*  --  8.9*   MCV  --  95  --  93  --  95   PLT  --  264  --  254  --  224   NA  --  136  --  138  --  134*   POTASSIUM  --  3.5  --  3.8  --  3.5   CHLORIDE  --  99  --  100  --  98   CO2  --  25  --  24  --  25   BUN  --  43.7*  --  41.1*  --  34.1*   CR  --  4.40*  --  4.62*  --  4.50*   ANIONGAP  --  12  --  14  --  11   TERESA  --  9.2  --  9.1  --  9.0   * 109* 166* 119*   < > 167*  167*   ALBUMIN  --  3.3*  --  3.2*  --  3.3*   PROTTOTAL  --   --   --   --   --  6.1*   BILITOTAL  --   --   --   --   --  0.4   ALKPHOS  --   --   --   --   --  127   ALT  --   --   --   --   --  17   AST  --   --   --   --   --  29    < > = values in this interval not displayed.       No results found for this or any previous visit (from the past 24 hour(s)).

## 2023-11-22 NOTE — PROGRESS NOTES
Alert and oriented X3. Forgetful. Sera study assist of 2. Minced and moist diet. No dialysis today. Spicer placed for urinary retention. Avoid nephrotoxics per Nephrologist.Discharge to ARU is pending.

## 2023-11-22 NOTE — PLAN OF CARE
Goal Outcome Evaluation:         Shift: 11-22-23, [id number]  Summary: post op 27 thoracic to peliv decomp and fusion with post op complications  Cognition: A/O x 4, flat affect, forgetful  Vitals: VSS on RA  Pain: denies  Activity: A x 2 serosteady, t/repo  Diet: minced and moist mild thick  Bladder/bowel: incontinent sykes, no BM this shift  Labs/BG: , creat 4.62, GFR 9, hmg 9.1  Tele: na  IV: midline SL, R CVC for dialysis  Consults: neph, nuerosurg, SW  Discharge: TBD to ARU SW following  Other:  No nausea, or SOB. Lung sounds diminished. Bowel sounds active. CMS intact. Nueros intact. Skin redness on sacrum, scattered bruises, redness in breast folds. Dialysis m, w, f. Continue to monitor.

## 2023-11-22 NOTE — PROGRESS NOTES
"[facility] - Progress Note  [facility]    Referral Source: Introduction to [facility]    I oriented \"Delmi\" to [facility]. Pt declined need for [facility] at this time due to need for cares and expecting to discharge soon.     Plan: Informed pt how she can request further  support.      Jenn Street  Chaplain Resident    [facility] routine referrals *[phone]  [facility] available 24/7 for emergent requests/referrals, either by paging the on-call  or by entering an ASAP/STAT consult in Epic (this will also page the on-call ).     "

## 2023-11-23 ENCOUNTER — APPOINTMENT (OUTPATIENT)
Dept: PHYSICAL THERAPY | Facility: CLINIC | Age: 83
DRG: 453 | End: 2023-11-23
Attending: NEUROLOGICAL SURGERY
Payer: COMMERCIAL

## 2023-11-23 LAB
ALBUMIN SERPL BCG-MCNC: 3 G/DL (ref 3.5–5.2)
ANION GAP SERPL CALCULATED.3IONS-SCNC: 13 MMOL/L (ref 7–15)
BUN SERPL-MCNC: 42.6 MG/DL (ref 8–23)
CALCIUM SERPL-MCNC: 8.7 MG/DL (ref 8.8–10.2)
CHLORIDE SERPL-SCNC: 103 MMOL/L (ref 98–107)
CREAT SERPL-MCNC: 4.02 MG/DL (ref 0.51–0.95)
DEPRECATED HCO3 PLAS-SCNC: 24 MMOL/L (ref 22–29)
EGFRCR SERPLBLD CKD-EPI 2021: 11 ML/MIN/1.73M2
ERYTHROCYTE [DISTWIDTH] IN BLOOD BY AUTOMATED COUNT: 16.1 % (ref 10–15)
GLUCOSE BLDC GLUCOMTR-MCNC: 105 MG/DL (ref 70–99)
GLUCOSE BLDC GLUCOMTR-MCNC: 107 MG/DL (ref 70–99)
GLUCOSE BLDC GLUCOMTR-MCNC: 111 MG/DL (ref 70–99)
GLUCOSE BLDC GLUCOMTR-MCNC: 114 MG/DL (ref 70–99)
GLUCOSE BLDC GLUCOMTR-MCNC: 117 MG/DL (ref 70–99)
GLUCOSE BLDC GLUCOMTR-MCNC: 129 MG/DL (ref 70–99)
GLUCOSE SERPL-MCNC: 110 MG/DL (ref 70–99)
HCT VFR BLD AUTO: 25.7 % (ref 35–47)
HGB BLD-MCNC: 7.9 G/DL (ref 11.7–15.7)
MCH RBC QN AUTO: 28.4 PG (ref 26.5–33)
MCHC RBC AUTO-ENTMCNC: 30.7 G/DL (ref 31.5–36.5)
MCV RBC AUTO: 92 FL (ref 78–100)
PHOSPHATE SERPL-MCNC: 3.7 MG/DL (ref 2.5–4.5)
PLATELET # BLD AUTO: 239 10E3/UL (ref 150–450)
POTASSIUM SERPL-SCNC: 3.6 MMOL/L (ref 3.4–5.3)
RBC # BLD AUTO: 2.78 10E6/UL (ref 3.8–5.2)
SODIUM SERPL-SCNC: 140 MMOL/L (ref 135–145)
WBC # BLD AUTO: 5.7 10E3/UL (ref 4–11)

## 2023-11-23 PROCEDURE — 250N000013 HC RX MED GY IP 250 OP 250 PS 637: Performed by: INTERNAL MEDICINE

## 2023-11-23 PROCEDURE — 85027 COMPLETE CBC AUTOMATED: CPT | Performed by: INTERNAL MEDICINE

## 2023-11-23 PROCEDURE — 250N000011 HC RX IP 250 OP 636: Performed by: INTERNAL MEDICINE

## 2023-11-23 PROCEDURE — 80069 RENAL FUNCTION PANEL: CPT | Performed by: INTERNAL MEDICINE

## 2023-11-23 PROCEDURE — 250N000013 HC RX MED GY IP 250 OP 250 PS 637: Performed by: STUDENT IN AN ORGANIZED HEALTH CARE EDUCATION/TRAINING PROGRAM

## 2023-11-23 PROCEDURE — 250N000013 HC RX MED GY IP 250 OP 250 PS 637: Performed by: HOSPITALIST

## 2023-11-23 PROCEDURE — 250N000011 HC RX IP 250 OP 636: Mod: JZ | Performed by: NEUROLOGICAL SURGERY

## 2023-11-23 PROCEDURE — C9113 INJ PANTOPRAZOLE SODIUM, VIA: HCPCS | Mod: JZ | Performed by: NEUROLOGICAL SURGERY

## 2023-11-23 PROCEDURE — 99231 SBSQ HOSP IP/OBS SF/LOW 25: CPT | Performed by: INTERNAL MEDICINE

## 2023-11-23 PROCEDURE — 97530 THERAPEUTIC ACTIVITIES: CPT | Mod: GP

## 2023-11-23 PROCEDURE — 99233 SBSQ HOSP IP/OBS HIGH 50: CPT | Performed by: INTERNAL MEDICINE

## 2023-11-23 PROCEDURE — 250N000013 HC RX MED GY IP 250 OP 250 PS 637: Performed by: NURSE PRACTITIONER

## 2023-11-23 PROCEDURE — 36415 COLL VENOUS BLD VENIPUNCTURE: CPT | Performed by: INTERNAL MEDICINE

## 2023-11-23 PROCEDURE — 97116 GAIT TRAINING THERAPY: CPT | Mod: GP

## 2023-11-23 PROCEDURE — 120N000001 HC R&B MED SURG/OB

## 2023-11-23 PROCEDURE — 99207 PR CDG-CUT & PASTE-POTENTIAL IMPACT ON LEVEL: CPT | Performed by: INTERNAL MEDICINE

## 2023-11-23 RX ADMIN — TRAZODONE HYDROCHLORIDE 25 MG: 50 TABLET ORAL at 21:13

## 2023-11-23 RX ADMIN — Medication 400 MG: at 09:18

## 2023-11-23 RX ADMIN — HEPARIN SODIUM 5000 UNITS: 5000 INJECTION, SOLUTION INTRAVENOUS; SUBCUTANEOUS at 09:18

## 2023-11-23 RX ADMIN — SENNOSIDES AND DOCUSATE SODIUM 1 TABLET: 8.6; 5 TABLET ORAL at 21:13

## 2023-11-23 RX ADMIN — DILTIAZEM HYDROCHLORIDE 180 MG: 180 CAPSULE, COATED, EXTENDED RELEASE ORAL at 09:18

## 2023-11-23 RX ADMIN — ACETAMINOPHEN 650 MG: 325 TABLET, FILM COATED ORAL at 17:16

## 2023-11-23 RX ADMIN — HEPARIN SODIUM 5000 UNITS: 5000 INJECTION, SOLUTION INTRAVENOUS; SUBCUTANEOUS at 17:12

## 2023-11-23 RX ADMIN — Medication 5 ML: at 17:12

## 2023-11-23 RX ADMIN — LEVOTHYROXINE SODIUM 100 MCG: 100 TABLET ORAL at 06:26

## 2023-11-23 RX ADMIN — POLYETHYLENE GLYCOL 3350 17 G: 17 POWDER, FOR SOLUTION ORAL at 09:18

## 2023-11-23 RX ADMIN — HYDRALAZINE HYDROCHLORIDE 10 MG: 10 TABLET ORAL at 13:26

## 2023-11-23 RX ADMIN — Medication 2 SPRAY: at 21:14

## 2023-11-23 RX ADMIN — PANTOPRAZOLE SODIUM 40 MG: 40 INJECTION, POWDER, FOR SOLUTION INTRAVENOUS at 06:28

## 2023-11-23 RX ADMIN — NYSTATIN: 100000 CREAM TOPICAL at 21:15

## 2023-11-23 RX ADMIN — HYDRALAZINE HYDROCHLORIDE 10 MG: 10 TABLET ORAL at 06:26

## 2023-11-23 RX ADMIN — Medication 2 SPRAY: at 09:19

## 2023-11-23 RX ADMIN — Medication 2 SPRAY: at 12:22

## 2023-11-23 RX ADMIN — HYDRALAZINE HYDROCHLORIDE 10 MG: 10 TABLET ORAL at 21:13

## 2023-11-23 RX ADMIN — MICONAZOLE NITRATE: 2 POWDER TOPICAL at 21:14

## 2023-11-23 RX ADMIN — HEPARIN SODIUM 5000 UNITS: 5000 INJECTION, SOLUTION INTRAVENOUS; SUBCUTANEOUS at 00:41

## 2023-11-23 RX ADMIN — NYSTATIN: 100000 CREAM TOPICAL at 12:23

## 2023-11-23 RX ADMIN — SENNOSIDES AND DOCUSATE SODIUM 1 TABLET: 8.6; 5 TABLET ORAL at 09:18

## 2023-11-23 ASSESSMENT — ACTIVITIES OF DAILY LIVING (ADL)
ADLS_ACUITY_SCORE: 38

## 2023-11-23 NOTE — PROGRESS NOTES
Notes, vital, labs reviewed.    AF-VSS  Good uo via sykes  Cr 4.0    LISA.  Pt's cr is trending lower and good uo.  Continue same plan.    Case d/w Dr. Omer.

## 2023-11-23 NOTE — PROGRESS NOTES
A&O X3. Forgetfulness.VSS on RA. Denies SOB, and pain.No prn during this shift..BS check.R PIV SL. R knee hematoma w/ blackened scabbed area in middle, low back incision CDI, L forearm skin tear w/foam dressing CDI. Incontinent B&B. Assist: A2 with Lift. Pt on Hemodialysis.Lung sounds diminished. CMS intact. Scattered bruises, redness in breast folds, nystatin cream applied as ordered.Neuro intact. Skin redness on sacrum.Spicer in place for urinary retention. See notes for more.Keep monitoring.

## 2023-11-23 NOTE — PROGRESS NOTES
[facility]    Hospitalist Progress Note    Date of Service (when I saw the patient): 11/23/2023  Admit date: 10/25/2023    Interval History   Full details of events over last 24 hours outlined below.   No acute events overnight per nursing notes.  Per social work referrals have been placed.  Awaiting calls back  Overnight she has been afebrile hemodynamically stable with good oxygenation on room air.  Urine output has been good with 1300 cc over the last 24-hour.   Labs this morning shows improved creatinine to 4.02.  Hemoglobin stable at 7.9  .First name offers no concerns other than that she is worried about her next bowel movement.  She was constipated and receiving anticonstipation medications and had an accident in the bed yesterday.  She is worried this will happen again due to her decreased mobility.    Assessment & Plan   Mya Hui is a 83 year old female with PMH Scoliosis, Lumbar radiculopathy, HTN, HLP, DM2, Hypothyroidism, CKD stage 3, Morbid obesity, GERD, Sliding hiatal hernia, Overactive bladder, OA, Chronic right shoulder pain/rotator cuff arthropathy, Migraines, Asymptomatic carotid artery stenosis who was admitted on 10/25/2023 with an elective T11 to pelvis robotic decompression and fusion.    Surgery was performed on 10/25/23 under general anesthesia.  Surgery was complicated with development of arrhythmia with significant hypotension and noted 3.5 L blood loss per Op Note and Anesthesia postprocedure evaluation note.  Patient received IV fluids, 6 units of pRBCs and 2 units of FFP intra-op.  ST changes were noted intraoperatively.  Patient remained intubated and started on Levophed and was transferred to the ICU due to hemorrhagic shock.  She required vasopressor support with Levophed until 10/27. Transferred out of the ICU with hospitalist comanging since 11/03     S/p elective T11 to pelvis robotic decompression and fusion on 10/25  Acute blood loss anemia with  post-op hemorrhagic shock, resolved  Shocked liver-resolved  Type 2 MI due to hemorrhagic shock, improved  Surgery was complicated with development of arrhythmia with significant hypotension and noted 3.5 L blood loss per Op Note and Anesthesia postprocedure evaluation note. Patient received IV fluids, 6 units of pRBCs and 2 units of FFP intra-op. Patient received additional 2 units FFP, 10 units of cryo and 1 unit of PLT while in the ICU. LFTs continue to improve 11/5-11/7.  Doing fine  PT/OT rec ARU     Encephalopathy, multifactorial, RESOLVED  Incidental finding of left basal ganglia restricted diffusion  Patient was noted to be encephalopathic for which Vascular Neurology was consulted.   * Brain MRI was completed and noted a left basal ganglia small focus of restricted diffusion but not a cause for encephalopathy and felt it was likely multifactorial.    * EEG monitor was completed and negative and subsequently stopped.    * Suspect due to uremic encephalopathy and sepsis.  * Encephalopathy improved 11/6    Delirium prevention:  Reorient patient at each interaction.  Keep room lights on and blinds open during day (8am-8pm), low lights 8pm-8am.  Minimize interruptions of sleep at night (consolidate vitals, nursing assessments, medications).  Avoid sedating medications as able.    Vascular Neurology recommended repeat brain MRI in 2-3 months     Severe LISA secondary to ATN S/p hemodialysis,   Anion gap metabolic acidosis due to above  CKD stage 3a  Hypoalbuminemia  Anasarca  Baseline creatinine between 1.1-1.4 with GFR in the 40-50's.   Due to development of LISA-D Nephrology was consulted and tunnel dialysis catheter placed 10/30 and has been undergoing HD Mon, Wed, Friday  Last hemodialysis was 11/17  Spicer placed for urinary retention.  New since surgery. Likely related to medications and decreased mobility.   Continue Spicer until more mobile with urology follow-up and voiding trial.     Recent Labs   Lab  11/23/23  0718 11/22/23  0740 11/21/23  0759 11/20/23  0955 11/19/23  0653 11/18/23  0600   CR 4.02* 4.40* 4.62* 4.50* 4.22* 3.32*       Anemia  Hb stable ~8 since her initial hemorrhage. 11/17- Hb lower at 7.6 No bleeding noted. Nephrology planning to give EPO   Hb now  8.2--8.7  monitor with intermittent labs    Recent Labs   Lab 11/23/23  0718 11/22/23  0740 11/21/23  0759 11/20/23  0955 11/19/23  0653 11/18/23  0600   HGB 7.9* 8.5* 9.1* 8.9* 8.7* 8.2*       Aspiration/ventilator associated PNA (Enterobacter, stenotrophomonas and E. Coli)  Acute hypoxic respiratory failure RESOLIVED  Leukocytosis due to above  Extubated 11/2. Patient has also developed aspiration/ventilator associated pneumonia with sputum Cx growing Enterobacter, stenotrophomonas and E. coli and was initially started on Zoysn and switched to Meropenem on 11/1/2023.  Currently continued on Meropenem and awaiting final cultures. Procal 3.   * completed 14 days course of antibiotics 11/14  Encourage use of IS/Flutter valve.       Dysphagia-improving  Moderate malnutrition  Failed SLP assessment. Bedside feeding tube placement coiling due to hiatal hernia. Started PPN     Dysphagia diet per speech  Nutrition consult appreciated on supplements.     Scoliosis  Lumbar radiculopathy  S/p T11 to pelvis robotic decompression and fusion  Neurosurgery is managing.  PT/OT  Plan to discharge to ARU     HTN  NSVT  Continue diltiazem  mg po daily and hydralazine 10 mg po q8h   Holding PTA lisinopril for ATN     HLP  Holding PTA pravastatin 40 mg/d to reduce pill burden      Type 2 DM, controlled, A1c 6.3 on 10/25  Stress induced hyperglycemia  - no insulin needs recently, possible due to antibiotic use  - consider reduction in glucose monitoring    Recent Labs   Lab 11/23/23  0718 11/23/23  0619 11/23/23  0143 11/22/23  2100 11/22/23  1645 11/22/23  1251   * 107* 114* 162* 116* 107*          Hypothyroidism   - continue PTA synthroid     "  GERD  Sliding hiatal hernia  - PTA PPI     OA  Chronic right shoulder pain/rotator cuff arthropathy  Chronic Pain  - Pain/analgesic management per Neurosurgery.    - As of 11/23, last oxycodone use was 11/17     Obesity   Suspected ANAYELI  Body mass index is 37.64 kg/m .  Increase in all-cause morbidity and mortality.   - Follow up with PCP regarding ongoing management.    - Monitor O2 saturations.    - Recommend outpatient sleep study.       Overactive bladder  - Holding PTA Ditropan XL 10 mg/d.       Migraines: Pain/analgesic management per Neurosurgery.       Asymptomatic carotid artery stenosis.     Hypomagnesemia   - RN replacement protocols  Recent Labs   Lab 11/23/23  0718 11/22/23  0740 11/21/23  0759 11/20/23  0955 11/19/23  0653 11/18/23  0600 11/17/23  0654 11/16/23 2010   POTASSIUM 3.6 3.5 3.8 3.5 3.9 4.1 4.4 4.6   MAG  --   --   --  1.9  --   --   --   --    PHOS 3.7 4.3 4.6* 4.0 4.8* 4.2 6.0* 5.6*       Clinically Significant Risk Factors              # Hypoalbuminemia: Lowest albumin = 2.5 g/dL at 11/1/2023  4:19 AM, will monitor as appropriate     # Hypertension: Noted on problem list        # Obesity: Estimated body mass index is 34.38 kg/m  as calculated from the following:    Height as of this encounter: 1.651 m (5' 5\").    Weight as of this encounter: 93.7 kg (206 lb 9.1 oz).   # Moderate Malnutrition: based on nutrition assessment      # Financial/Environmental Concerns: none           PT/OT: Recommending ARU  Diet: Orders Placed This Encounter      Combination Diet Minced and Moist Diet (level 5); Slightly Thick (level 1)     IVF:   DVT Prophylaxis: Heparin subcu  Spicer Catheter: PRESENT, indication: Strict 1-2 Hour I&O    Lines: Tunneled right IJ line for dialysis, midline left brachial  Cardiac Monitoring: None   Full Code  Disposition: Anticipated discharge to ARU vs TCU once placement found.  Patient is medically stable  Communication: Discussed with RN, nephrology and RN on 11/23.  " Discussed with daughter April on 11/22    Lauren Omer MD    Hospitalist Service  [facility]  Securely message with the [employer] Web Console (learn more here)  Text page via [employer] Paging/Directory      -Data reviewed today: I reviewed all new labs and imaging results over the last 24 hours. I personally reviewed no images or EKG's today.    Physical Exam   Temp: 99.1  F (37.3  C) Temp src: Oral BP: (!) 149/79 Pulse: 80   Resp: 18 SpO2: 96 % O2 Device: None (Room air)    Vitals:    11/19/23 0500 11/20/23 0251 11/23/23 0634   Weight: 96 kg (211 lb 10.3 oz) 99 kg (218 lb 4.1 oz) 93.7 kg (206 lb 9.1 oz)     Vital Signs with Ranges  Temp:  [97.5  F (36.4  C)-99.4  F (37.4  C)] 99.1  F (37.3  C)  Pulse:  [80-93] 80  Resp:  [16-18] 18  BP: (126-159)/(62-79) 149/79  SpO2:  [92 %-98 %] 96 %  I/O last 3 completed shifts:  In: 204 [P.O.:204]  Out: 1350 [Urine:1350]    Today's Exam  Constitutional:  NAD,   Neuropsyche: alert and oriented, answers questions appropriately.   Respiratory:  Breathing comfortably, good air exchange, no wheezes, no crackles.   Cardiovascular:  Regular rate and rhythm, no edema.  GI: soft, NT/ND, BS normal  Skin/Integumen:  No acute rash or sign of bleeding.     Medications   All medications reviewed on 11/23/23     dextrose        - MEDICATION INSTRUCTIONS for Dialysis Patients -   Does not apply See Admin Instructions    artificial saliva  2 spray Swish & Spit 4x Daily    B and C vitamin Complex with folic acid  5 mL Oral or Feeding Tube Daily    diltiazem ER COATED BEADS  180 mg Oral Daily    heparin ANTICOAGULANT  5,000 Units Subcutaneous Q8H    hydrALAZINE  10 mg Oral or Feeding Tube Q8H    levothyroxine  100 mcg Oral or NG Tube QAM AC    magnesium oxide  400 mg Oral Daily    nystatin   Topical BID    pantoprazole  40 mg Oral or Feeding Tube QAM AC    Or    pantoprazole  40 mg Intravenous QAM AC    polyethylene glycol  17 g Oral or NG Tube Daily    senna-docusate  1  tablet Oral or NG Tube BID    sodium chloride (PF)  3 mL Intracatheter Q8H    sodium chloride (PF)  9 mL Intracatheter During Dialysis/CRRT (from stock)    traZODone  25 mg Oral At Bedtime     PRN Meds: acetaminophen, bisacodyl, calcium carbonate, dextrose, glucose **OR** dextrose **OR** glucagon, hydrOXYzine, ipratropium - albuterol 0.5 mg/2.5 mg/3 mL, labetalol, lidocaine 4%, lidocaine (buffered or not buffered), magnesium hydroxide, melatonin, miconazole, naloxone **OR** naloxone **OR** naloxone **OR** naloxone, nitroGLYcerin, ondansetron **OR** ondansetron, oxyCODONE IR **OR** oxyCODONE, prochlorperazine **OR** prochlorperazine, sodium chloride (PF), sodium chloride 0.9%    Data   Recent Labs   Lab 11/23/23  0718 11/23/23  0619 11/23/23  0143 11/22/23  0805 11/22/23  0740 11/21/23  2107 11/21/23  0759 11/20/23  1729 11/20/23  0955   WBC 5.7  --   --   --  6.3  --  7.1  --  5.6   HGB 7.9*  --   --   --  8.5*  --  9.1*  --  8.9*   MCV 92  --   --   --  95  --  93  --  95     --   --   --  264  --  254  --  224     --   --   --  136  --  138  --  134*   POTASSIUM 3.6  --   --   --  3.5  --  3.8  --  3.5   CHLORIDE 103  --   --   --  99  --  100  --  98   CO2 24  --   --   --  25  --  24  --  25   BUN 42.6*  --   --   --  43.7*  --  41.1*  --  34.1*   CR 4.02*  --   --   --  4.40*  --  4.62*  --  4.50*   ANIONGAP 13  --   --   --  12  --  14  --  11   TERESA 8.7*  --   --   --  9.2  --  9.1  --  9.0   * 107* 114*   < > 109*   < > 119*   < > 167*  167*   ALBUMIN 3.0*  --   --   --  3.3*  --  3.2*  --  3.3*   PROTTOTAL  --   --   --   --   --   --   --   --  6.1*   BILITOTAL  --   --   --   --   --   --   --   --  0.4   ALKPHOS  --   --   --   --   --   --   --   --  127   ALT  --   --   --   --   --   --   --   --  17   AST  --   --   --   --   --   --   --   --  29    < > = values in this interval not displayed.       No results found for this or any previous visit (from the past 24 hour(s)).

## 2023-11-23 NOTE — PROGRESS NOTES
[facility]  Neurosurgery Daily Progress Note        Assessment & Plan  Procedure(s):  Thoracic 11 to pelvis robotic decompression and fusion   -29 Days Post-Op  Patient doing well.  No new issues.       Plan:  - Discharge pending ARU placement, SW following   - Continue PT/OT, wear brace when out of bed   - Pain control measures as needed   - Routine wound care   - Follow up with NSGY clinic at 6 weeks post op with xray prior    - Appreciate assistance from specialties      Zamzam Ya MPAS  [facility] Neurosurgery  [address] [address]    Tel [phone]  Pager [phone]

## 2023-11-23 NOTE — PLAN OF CARE
Goal Outcome Evaluation:  Patient vital signs are at baseline: yes  Patient able to ambulate as they were prior to admission or with assist devices provided by therapies during their stay:  no  Patient MUST void prior to discharge:  no  Patient able to tolerate oral intake:  yes  Pain has adequate pain control using Oral analgesics:  yes  Does patient have an identified :    Has goal D/C date and time been discussed with patient:  no  Alert and oriented X4, VSS on RA.  Incision on lower back CDI. Spicer in place with adequate urine output (300 ml). Turn and reposition Q2 hours. Midline on L arm and dialysis on R chest. Redness on sacrum area. Pt was incontinent of stool x1. Mepilex changed. Pt denies pain. Discharge pending.

## 2023-11-23 NOTE — PLAN OF CARE
Goal Outcome Evaluation:  Patient vital signs are at baseline: yes  Patient able to ambulate as they were prior to admission or with assist devices provided by therapies during their stay:  no  Patient MUST void prior to discharge:  no  Patient able to tolerate oral intake:  yes  Pain has adequate pain control using Oral analgesics:  yes  Does patient have an identified :    Has goal D/C date and time been discussed with patient:  no  Alert and oriented X4, VSS on RA.  Incision on lowe back CDI. Spicer in place with adequate urine output. Turn and reposition Q2 hours. Midline flush ok but no blood return.  Midline on L arm and dialysis on R chest. Redness on sacrum area. Denies pain. Discharge pending.

## 2023-11-24 ENCOUNTER — APPOINTMENT (OUTPATIENT)
Dept: PHYSICAL THERAPY | Facility: CLINIC | Age: 83
DRG: 453 | End: 2023-11-24
Attending: NEUROLOGICAL SURGERY
Payer: COMMERCIAL

## 2023-11-24 ENCOUNTER — APPOINTMENT (OUTPATIENT)
Dept: INTERVENTIONAL RADIOLOGY/VASCULAR | Facility: CLINIC | Age: 83
DRG: 453 | End: 2023-11-24
Attending: INTERNAL MEDICINE
Payer: COMMERCIAL

## 2023-11-24 ENCOUNTER — APPOINTMENT (OUTPATIENT)
Dept: OCCUPATIONAL THERAPY | Facility: CLINIC | Age: 83
DRG: 453 | End: 2023-11-24
Attending: NEUROLOGICAL SURGERY
Payer: COMMERCIAL

## 2023-11-24 LAB
ALBUMIN SERPL BCG-MCNC: 3.3 G/DL (ref 3.5–5.2)
ANION GAP SERPL CALCULATED.3IONS-SCNC: 13 MMOL/L (ref 7–15)
BUN SERPL-MCNC: 37.3 MG/DL (ref 8–23)
CALCIUM SERPL-MCNC: 9 MG/DL (ref 8.8–10.2)
CHLORIDE SERPL-SCNC: 102 MMOL/L (ref 98–107)
CREAT SERPL-MCNC: 3.57 MG/DL (ref 0.51–0.95)
DEPRECATED HCO3 PLAS-SCNC: 24 MMOL/L (ref 22–29)
EGFRCR SERPLBLD CKD-EPI 2021: 12 ML/MIN/1.73M2
ERYTHROCYTE [DISTWIDTH] IN BLOOD BY AUTOMATED COUNT: 16.1 % (ref 10–15)
GLUCOSE BLDC GLUCOMTR-MCNC: 105 MG/DL (ref 70–99)
GLUCOSE BLDC GLUCOMTR-MCNC: 116 MG/DL (ref 70–99)
GLUCOSE BLDC GLUCOMTR-MCNC: 152 MG/DL (ref 70–99)
GLUCOSE BLDC GLUCOMTR-MCNC: 204 MG/DL (ref 70–99)
GLUCOSE BLDC GLUCOMTR-MCNC: 99 MG/DL (ref 70–99)
GLUCOSE SERPL-MCNC: 121 MG/DL (ref 70–99)
HCT VFR BLD AUTO: 28.2 % (ref 35–47)
HGB BLD-MCNC: 8.7 G/DL (ref 11.7–15.7)
MCH RBC QN AUTO: 29 PG (ref 26.5–33)
MCHC RBC AUTO-ENTMCNC: 30.9 G/DL (ref 31.5–36.5)
MCV RBC AUTO: 94 FL (ref 78–100)
PHOSPHATE SERPL-MCNC: 3.9 MG/DL (ref 2.5–4.5)
PLATELET # BLD AUTO: 264 10E3/UL (ref 150–450)
POTASSIUM SERPL-SCNC: 3.6 MMOL/L (ref 3.4–5.3)
RBC # BLD AUTO: 3 10E6/UL (ref 3.8–5.2)
SODIUM SERPL-SCNC: 139 MMOL/L (ref 135–145)
WBC # BLD AUTO: 6.1 10E3/UL (ref 4–11)

## 2023-11-24 PROCEDURE — 250N000009 HC RX 250: Performed by: NURSE PRACTITIONER

## 2023-11-24 PROCEDURE — 97530 THERAPEUTIC ACTIVITIES: CPT | Mod: GP

## 2023-11-24 PROCEDURE — 02PA33Z REMOVAL OF INFUSION DEVICE FROM HEART, PERCUTANEOUS APPROACH: ICD-10-PCS | Performed by: RADIOLOGY

## 2023-11-24 PROCEDURE — 97116 GAIT TRAINING THERAPY: CPT | Mod: GP

## 2023-11-24 PROCEDURE — 250N000013 HC RX MED GY IP 250 OP 250 PS 637: Performed by: HOSPITALIST

## 2023-11-24 PROCEDURE — 99233 SBSQ HOSP IP/OBS HIGH 50: CPT | Performed by: INTERNAL MEDICINE

## 2023-11-24 PROCEDURE — 99232 SBSQ HOSP IP/OBS MODERATE 35: CPT | Performed by: INTERNAL MEDICINE

## 2023-11-24 PROCEDURE — 120N000001 HC R&B MED SURG/OB

## 2023-11-24 PROCEDURE — 250N000011 HC RX IP 250 OP 636: Mod: JZ | Performed by: INTERNAL MEDICINE

## 2023-11-24 PROCEDURE — 250N000013 HC RX MED GY IP 250 OP 250 PS 637: Performed by: INTERNAL MEDICINE

## 2023-11-24 PROCEDURE — 99207 PR CDG-CUT & PASTE-POTENTIAL IMPACT ON LEVEL: CPT | Performed by: INTERNAL MEDICINE

## 2023-11-24 PROCEDURE — 36589 REMOVAL TUNNELED CV CATH: CPT

## 2023-11-24 PROCEDURE — 97110 THERAPEUTIC EXERCISES: CPT | Mod: GO

## 2023-11-24 PROCEDURE — 85027 COMPLETE CBC AUTOMATED: CPT | Performed by: INTERNAL MEDICINE

## 2023-11-24 PROCEDURE — 250N000013 HC RX MED GY IP 250 OP 250 PS 637: Performed by: NURSE PRACTITIONER

## 2023-11-24 PROCEDURE — 36415 COLL VENOUS BLD VENIPUNCTURE: CPT | Performed by: INTERNAL MEDICINE

## 2023-11-24 PROCEDURE — 97530 THERAPEUTIC ACTIVITIES: CPT | Mod: GO

## 2023-11-24 PROCEDURE — 80069 RENAL FUNCTION PANEL: CPT | Performed by: INTERNAL MEDICINE

## 2023-11-24 PROCEDURE — 0JPT3XZ REMOVAL OF TUNNELED VASCULAR ACCESS DEVICE FROM TRUNK SUBCUTANEOUS TISSUE AND FASCIA, PERCUTANEOUS APPROACH: ICD-10-PCS | Performed by: RADIOLOGY

## 2023-11-24 PROCEDURE — 250N000011 HC RX IP 250 OP 636: Mod: JZ | Performed by: NEUROLOGICAL SURGERY

## 2023-11-24 PROCEDURE — 250N000013 HC RX MED GY IP 250 OP 250 PS 637: Performed by: STUDENT IN AN ORGANIZED HEALTH CARE EDUCATION/TRAINING PROGRAM

## 2023-11-24 PROCEDURE — 97535 SELF CARE MNGMENT TRAINING: CPT | Mod: GO

## 2023-11-24 PROCEDURE — C9113 INJ PANTOPRAZOLE SODIUM, VIA: HCPCS | Mod: JZ | Performed by: NEUROLOGICAL SURGERY

## 2023-11-24 RX ADMIN — HYDRALAZINE HYDROCHLORIDE 10 MG: 10 TABLET ORAL at 13:21

## 2023-11-24 RX ADMIN — Medication 2 SPRAY: at 13:22

## 2023-11-24 RX ADMIN — SENNOSIDES AND DOCUSATE SODIUM 1 TABLET: 8.6; 5 TABLET ORAL at 22:37

## 2023-11-24 RX ADMIN — HEPARIN SODIUM 5000 UNITS: 5000 INJECTION, SOLUTION INTRAVENOUS; SUBCUTANEOUS at 23:17

## 2023-11-24 RX ADMIN — LIDOCAINE HYDROCHLORIDE 5 ML: 10 INJECTION, SOLUTION INFILTRATION; PERINEURAL at 14:37

## 2023-11-24 RX ADMIN — NYSTATIN: 100000 CREAM TOPICAL at 13:23

## 2023-11-24 RX ADMIN — Medication 2 SPRAY: at 22:39

## 2023-11-24 RX ADMIN — TRAZODONE HYDROCHLORIDE 25 MG: 50 TABLET ORAL at 22:37

## 2023-11-24 RX ADMIN — Medication 400 MG: at 08:58

## 2023-11-24 RX ADMIN — Medication 2 SPRAY: at 08:57

## 2023-11-24 RX ADMIN — HYDRALAZINE HYDROCHLORIDE 10 MG: 10 TABLET ORAL at 05:15

## 2023-11-24 RX ADMIN — DILTIAZEM HYDROCHLORIDE 180 MG: 180 CAPSULE, COATED, EXTENDED RELEASE ORAL at 08:58

## 2023-11-24 RX ADMIN — Medication 2 SPRAY: at 17:45

## 2023-11-24 RX ADMIN — NYSTATIN: 100000 CREAM TOPICAL at 20:37

## 2023-11-24 RX ADMIN — PANTOPRAZOLE SODIUM 40 MG: 40 INJECTION, POWDER, FOR SOLUTION INTRAVENOUS at 06:17

## 2023-11-24 RX ADMIN — HYDRALAZINE HYDROCHLORIDE 10 MG: 10 TABLET ORAL at 22:37

## 2023-11-24 RX ADMIN — SENNOSIDES AND DOCUSATE SODIUM 1 TABLET: 8.6; 5 TABLET ORAL at 08:58

## 2023-11-24 RX ADMIN — ACETAMINOPHEN 650 MG: 325 TABLET, FILM COATED ORAL at 06:24

## 2023-11-24 RX ADMIN — HEPARIN SODIUM 5000 UNITS: 5000 INJECTION, SOLUTION INTRAVENOUS; SUBCUTANEOUS at 17:24

## 2023-11-24 RX ADMIN — Medication 5 ML: at 17:24

## 2023-11-24 RX ADMIN — HEPARIN SODIUM 5000 UNITS: 5000 INJECTION, SOLUTION INTRAVENOUS; SUBCUTANEOUS at 01:42

## 2023-11-24 RX ADMIN — LEVOTHYROXINE SODIUM 100 MCG: 100 TABLET ORAL at 06:19

## 2023-11-24 RX ADMIN — HEPARIN SODIUM 5000 UNITS: 5000 INJECTION, SOLUTION INTRAVENOUS; SUBCUTANEOUS at 09:02

## 2023-11-24 ASSESSMENT — ACTIVITIES OF DAILY LIVING (ADL)
ADLS_ACUITY_SCORE: 38
ADLS_ACUITY_SCORE: 34
ADLS_ACUITY_SCORE: 34
ADLS_ACUITY_SCORE: 38
ADLS_ACUITY_SCORE: 34
ADLS_ACUITY_SCORE: 38
ADLS_ACUITY_SCORE: 34

## 2023-11-24 NOTE — PROGRESS NOTES
[facility]     Renal Progress Note       SHORTHAND KEY FOR MY NOTES:  c = with, s = without, p = after, a = before, x = except, asx = asymptomatic, tx = transplant or treatment, sx = symptoms or symptomatic, cx = canceled or culture, rxn = reaction, yday = yesterday, nl = normal, abx = antibiotics, fxn = function, dx = diagnosis, dz = disease, m/h = melena/hematochezia, c/d/l/ha = cramping/dizziness/lightheadedness/headache, d/c = discharge or diarrhea/constipation, f/c/n/v = fevers/chills/nausea/vomiting, cp/sob = chest pain/shortness of breath, tbv = total body volume, rxn = reaction, tdc = tunneled dialysis catheter, pta = prior to admission, hd = hemodialysis, pd = peritoneal dialysis, hhd = home hemodialysis, edw = estimated dry wt         Assessment/Plan:     1.  Severe LISA/CKD IIIa.  Pt's cr continues to trend lower each day.  She is urinating well and feels fine.  TBV is good and no uremic sx.  She does not need any more dialysis at this time.  A.  Pull TDC today.  Appreciate IR assistance.  B.  Follow labs, uo, sx daily.    2.  Anemia. Pt's hb is stable in the 8s.   A.  Follow hb, clinically.    3.  FEN.  Electrolytes are ok.      A.  Follow electrolytes daily.    Case d/w Alva Camacho.        Interval History:     Pt feels ok and is hungry for some pumpkin pie.  She is urinating more and is tolerating the sykes.  No cp/sob.          Medications and Allergies:      - MEDICATION INSTRUCTIONS for Dialysis Patients -   Does not apply See Admin Instructions    artificial saliva  2 spray Swish & Spit 4x Daily    B and C vitamin Complex with folic acid  5 mL Oral or Feeding Tube Daily    diltiazem ER COATED BEADS  180 mg Oral Daily    heparin ANTICOAGULANT  5,000 Units Subcutaneous Q8H    hydrALAZINE  10 mg Oral or Feeding Tube Q8H    levothyroxine  100 mcg Oral or NG Tube QAM AC    magnesium oxide  400 mg Oral Daily    nystatin   Topical BID    pantoprazole  40 mg Oral or Feeding  "Tube QAM AC    Or    pantoprazole  40 mg Intravenous QAM AC    polyethylene glycol  17 g Oral or NG Tube Daily    senna-docusate  1 tablet Oral or NG Tube BID    sodium chloride (PF)  3 mL Intracatheter Q8H    sodium chloride (PF)  9 mL Intracatheter During Dialysis/CRRT (from stock)    traZODone  25 mg Oral At Bedtime     Allergies   Allergen Reactions    Fentanyl Nausea and Vomiting     VERY sensitive to most narcotics if not all.    Morphine And Related Nausea          Physical Exam:     Vitals were reviewed     , Blood pressure (!) 168/69, pulse 76, temperature 98.3  F (36.8  C), temperature source Oral, resp. rate 16, height 1.651 m (5' 5\"), weight 93.7 kg (206 lb 9.1 oz), SpO2 98%.  Wt Readings from Last 3 Encounters:   11/23/23 93.7 kg (206 lb 9.1 oz)   10/19/23 95 kg (209 lb 8 oz)   09/28/23 97.1 kg (214 lb)     Intake/Output Summary (Last 24 hours) at 11/24/2023 1530  Last data filed at 11/24/2023 0559  Gross per 24 hour   Intake 900 ml   Output 1150 ml   Net -250 ml     GENERAL APPEARANCE: pleasant, NAD, alert  RESP: CTA B c good efforts  CV: RRR, nl S1/S2   ABDOMEN: morbid obesity  EXTREMITIES/SKIN: tr ble edema  OTHER:  + sykes c yellow urine         Data:     CBC RESULTS:     Recent Labs   Lab 11/24/23  0746 11/23/23  0718 11/22/23  0740 11/21/23  0759 11/20/23  0955 11/19/23  0653   WBC 6.1 5.7 6.3 7.1 5.6 6.3   RBC 3.00* 2.78* 3.02* 3.19* 3.15* 3.05*   HGB 8.7* 7.9* 8.5* 9.1* 8.9* 8.7*   HCT 28.2* 25.7* 28.8* 29.5* 29.8* 28.9*    239 264 254 224 222     Basic Metabolic Panel:  Recent Labs   Lab 11/24/23  1242 11/24/23  0916 11/24/23  0746 11/24/23  0156 11/23/23  2128 11/23/23  1638 11/23/23  0859 11/23/23  0718 11/22/23  0805 11/22/23  0740 11/21/23  2107 11/21/23  0759 11/20/23  1729 11/20/23  0955 11/19/23  0839 11/19/23  0653   NA  --   --  139  --   --   --   --  140  --  136  --  138  --  134*  --  138   POTASSIUM  --   --  3.6  --   --   --   --  3.6  --  3.5  --  3.8  --  3.5  --  3.9 "   CHLORIDE  --   --  102  --   --   --   --  103  --  99  --  100  --  98  --  100   CO2  --   --  24  --   --   --   --  24  --  25  --  24  --  25  --  27   BUN  --   --  37.3*  --   --   --   --  42.6*  --  43.7*  --  41.1*  --  34.1*  --  30.2*   CR  --   --  3.57*  --   --   --   --  4.02*  --  4.40*  --  4.62*  --  4.50*  --  4.22*   * 204* 121* 99 111* 117*   < > 110*   < > 109*   < > 119*   < > 167*  167*   < > 116*  116*   TERESA  --   --  9.0  --   --   --   --  8.7*  --  9.2  --  9.1  --  9.0  --  9.0    < > = values in this interval not displayed.     INRNo lab results found in last 7 days.   Attestation:   I have reviewed today's relevant vital signs, notes, medications, labs and imaging.    Wade Fish MD  [facility] - Nephrology  [phone]

## 2023-11-24 NOTE — PROGRESS NOTES
[facility]  Neurosurgery Daily Progress Note        Assessment & Plan  Procedure(s):  Thoracic 11 to pelvis robotic decompression and fusion   -30 Days Post-Op  Chart reviewed.  No new issues.       Plan:  - Discharge pending ARU placement, SW following   - Continue PT/OT, wear brace when out of bed   - Pain control measures as needed   - Routine wound care   - Follow up with NSGY clinic at 6 weeks post op with xray prior    - Appreciate assistance from specialties

## 2023-11-24 NOTE — PLAN OF CARE
Goal Outcome Evaluation:  Summary: 11/23/ 23. [id number]  Cognition:patient is alert and oriented  x 4,  forgetful atimes.  Vitals: VSS on RA with good sat.  Pain: patient reports pain, prn acetaminophen and ice packed applied with effective results.  Activity: up with assist of 2 with lift device. TLSO brace on when patient out of bed. On daily weight check.  Diet: Minced and Moist Diet with  Slightly Thick liquids. Takes pills whole with slightly thick liquids,  Bladder/bowel:  Incontinent of bowel, patient had soft BM this shift. Spicer catheter in place with moderate output.   Labs/BG:   Tele: No TELE. Continuous pulse oximetry.    IV: midline saline locked, R CVC for dialysis  Consults: neph, nuerosurg, THELMA  Discharge: TBD to ARU SW following  Other: CMS intact. No nausea, or SOB. Lung sounds diminished. Neuro intact. CMS intact. Dressing dry and intact, open to air. Scattered bruises, redness  breast folds and schedule nystatin applied.   Hemodialysis Monday, Wednesdays and Friday.  Continue to monitor. Hemodialysis tomorrow,RN to reviewed medication instructions for dialysis on hemodialysis days.

## 2023-11-24 NOTE — IR NOTE
Interventional Radiology Post-Procedure Note   ?   Brief Procedure Note:   Patient name: Mya Hui  Pt MRN:[id number]   Date of procedure: 11/24/2023     Procedure(s): Tunneled central line removal  Sedation method: Moderate sedation was not employed. Local lidocaine only.  Pre Procedure Diagnosis: Renal failure  Post Procedure Diagnosis: Same  Indications: No longer needed   ?   Specimen(s) removed: None   Additional studies ordered: None  Drains: None   Estimated Blood Loss: Minimal  Complications: None  Vascular closure method: N/A    Findings/Notes/Comments: Successful right internal jugular tunneled dialysis catheter removal.   ?   Please see dictation in PACS or under the Imaging tab in EPIC for detailed procedure note.     Magen Calle M.D.   Vascular and Interventional Radiology   Pager: [phone]   After Hours / Scheduling: [phone]     11/24/2023  2:39 PM

## 2023-11-24 NOTE — PLAN OF CARE
Goal Outcome Evaluation:             Patient vital signs are at baseline: Yes, hypertensive at times   Patient able to ambulate as they were prior to admission or with assist devices provided by therapies during their stay:  Yes, up w/ 2 and vaughn steady prior but not oob this shift   Patient MUST void prior to discharge:  No,  Reason:  sykes placed for retention   Patient able to tolerate oral intake:  Yes, minced and moist diet, thickened liquids   Pain has adequate pain control using Oral analgesics:  Yes, tylenol x1 for headache effective per pt   Does patient have an identified :  No,  Reason:  TBD  Has goal D/C date and time been discussed with patient:  No,  Reason:  pending placement

## 2023-11-24 NOTE — IR NOTE
Interventional Radiology Intra-procedural Nursing Note    Patient Name: Mya Hui  Medical Record Number: [id number]  Today's Date: November 24, 2023    Procedure: Tunneled Line Removal     Start time: 1434  End time: 1438  Report provided to: Yuliya Marceol RN      Note: Patient entered Interventional Radiology Suite number 1 via cart. Patient awake, alert and oriented. Assisted onto procedural table in Sitting position. Prepped and draped.  Dr. Calle in room. Time out and procedure started.     Procedure well tolerated by patient without complications. Procedure end with debrief by Dr. Calle.  Pressure held until hemostasis achieved. Gauze dressing applied to Rt tunneled Site.    Administered medication totals:  Lidocaine 1% 5 mL Intradermal        No Sedation given for this Procedure.

## 2023-11-25 ENCOUNTER — APPOINTMENT (OUTPATIENT)
Dept: PHYSICAL THERAPY | Facility: CLINIC | Age: 83
DRG: 453 | End: 2023-11-25
Attending: NEUROLOGICAL SURGERY
Payer: COMMERCIAL

## 2023-11-25 LAB
ALBUMIN SERPL BCG-MCNC: 3.2 G/DL (ref 3.5–5.2)
ANION GAP SERPL CALCULATED.3IONS-SCNC: 13 MMOL/L (ref 7–15)
BUN SERPL-MCNC: 33.5 MG/DL (ref 8–23)
CALCIUM SERPL-MCNC: 9 MG/DL (ref 8.8–10.2)
CHLORIDE SERPL-SCNC: 98 MMOL/L (ref 98–107)
CREAT SERPL-MCNC: 3.26 MG/DL (ref 0.51–0.95)
DEPRECATED HCO3 PLAS-SCNC: 23 MMOL/L (ref 22–29)
EGFRCR SERPLBLD CKD-EPI 2021: 14 ML/MIN/1.73M2
ERYTHROCYTE [DISTWIDTH] IN BLOOD BY AUTOMATED COUNT: 16.1 % (ref 10–15)
GLUCOSE BLDC GLUCOMTR-MCNC: 114 MG/DL (ref 70–99)
GLUCOSE BLDC GLUCOMTR-MCNC: 116 MG/DL (ref 70–99)
GLUCOSE BLDC GLUCOMTR-MCNC: 119 MG/DL (ref 70–99)
GLUCOSE BLDC GLUCOMTR-MCNC: 140 MG/DL (ref 70–99)
GLUCOSE SERPL-MCNC: 105 MG/DL (ref 70–99)
HCT VFR BLD AUTO: 28.9 % (ref 35–47)
HGB BLD-MCNC: 8.9 G/DL (ref 11.7–15.7)
MCH RBC QN AUTO: 28.3 PG (ref 26.5–33)
MCHC RBC AUTO-ENTMCNC: 30.8 G/DL (ref 31.5–36.5)
MCV RBC AUTO: 92 FL (ref 78–100)
PHOSPHATE SERPL-MCNC: 3.3 MG/DL (ref 2.5–4.5)
PLATELET # BLD AUTO: 258 10E3/UL (ref 150–450)
POTASSIUM SERPL-SCNC: 3.9 MMOL/L (ref 3.4–5.3)
RBC # BLD AUTO: 3.14 10E6/UL (ref 3.8–5.2)
SODIUM SERPL-SCNC: 134 MMOL/L (ref 135–145)
WBC # BLD AUTO: 7 10E3/UL (ref 4–11)

## 2023-11-25 PROCEDURE — 97110 THERAPEUTIC EXERCISES: CPT | Mod: GP | Performed by: PHYSICAL THERAPIST

## 2023-11-25 PROCEDURE — 250N000013 HC RX MED GY IP 250 OP 250 PS 637: Performed by: NURSE PRACTITIONER

## 2023-11-25 PROCEDURE — C9113 INJ PANTOPRAZOLE SODIUM, VIA: HCPCS | Mod: JZ | Performed by: NEUROLOGICAL SURGERY

## 2023-11-25 PROCEDURE — 250N000013 HC RX MED GY IP 250 OP 250 PS 637: Performed by: INTERNAL MEDICINE

## 2023-11-25 PROCEDURE — 250N000013 HC RX MED GY IP 250 OP 250 PS 637: Performed by: HOSPITALIST

## 2023-11-25 PROCEDURE — 85027 COMPLETE CBC AUTOMATED: CPT | Performed by: INTERNAL MEDICINE

## 2023-11-25 PROCEDURE — 80069 RENAL FUNCTION PANEL: CPT | Performed by: INTERNAL MEDICINE

## 2023-11-25 PROCEDURE — 120N000001 HC R&B MED SURG/OB

## 2023-11-25 PROCEDURE — 97530 THERAPEUTIC ACTIVITIES: CPT | Mod: GP | Performed by: PHYSICAL THERAPIST

## 2023-11-25 PROCEDURE — 99207 PR CDG-CUT & PASTE-POTENTIAL IMPACT ON LEVEL: CPT | Performed by: INTERNAL MEDICINE

## 2023-11-25 PROCEDURE — 250N000011 HC RX IP 250 OP 636: Mod: JZ | Performed by: INTERNAL MEDICINE

## 2023-11-25 PROCEDURE — 36415 COLL VENOUS BLD VENIPUNCTURE: CPT | Performed by: INTERNAL MEDICINE

## 2023-11-25 PROCEDURE — 250N000013 HC RX MED GY IP 250 OP 250 PS 637: Performed by: STUDENT IN AN ORGANIZED HEALTH CARE EDUCATION/TRAINING PROGRAM

## 2023-11-25 PROCEDURE — 250N000011 HC RX IP 250 OP 636: Mod: JZ | Performed by: NEUROLOGICAL SURGERY

## 2023-11-25 PROCEDURE — 99233 SBSQ HOSP IP/OBS HIGH 50: CPT | Performed by: INTERNAL MEDICINE

## 2023-11-25 RX ADMIN — NYSTATIN: 100000 CREAM TOPICAL at 13:24

## 2023-11-25 RX ADMIN — NYSTATIN: 100000 CREAM TOPICAL at 21:13

## 2023-11-25 RX ADMIN — LEVOTHYROXINE SODIUM 100 MCG: 100 TABLET ORAL at 06:34

## 2023-11-25 RX ADMIN — PANTOPRAZOLE SODIUM 40 MG: 40 INJECTION, POWDER, FOR SOLUTION INTRAVENOUS at 06:34

## 2023-11-25 RX ADMIN — HYDRALAZINE HYDROCHLORIDE 10 MG: 10 TABLET ORAL at 21:05

## 2023-11-25 RX ADMIN — SENNOSIDES AND DOCUSATE SODIUM 1 TABLET: 8.6; 5 TABLET ORAL at 08:36

## 2023-11-25 RX ADMIN — Medication 2 SPRAY: at 21:08

## 2023-11-25 RX ADMIN — HEPARIN SODIUM 5000 UNITS: 5000 INJECTION, SOLUTION INTRAVENOUS; SUBCUTANEOUS at 08:36

## 2023-11-25 RX ADMIN — SENNOSIDES AND DOCUSATE SODIUM 1 TABLET: 8.6; 5 TABLET ORAL at 21:05

## 2023-11-25 RX ADMIN — HYDRALAZINE HYDROCHLORIDE 10 MG: 10 TABLET ORAL at 06:33

## 2023-11-25 RX ADMIN — Medication 5 ML: at 21:07

## 2023-11-25 RX ADMIN — HYDRALAZINE HYDROCHLORIDE 10 MG: 10 TABLET ORAL at 13:23

## 2023-11-25 RX ADMIN — DILTIAZEM HYDROCHLORIDE 180 MG: 180 CAPSULE, COATED, EXTENDED RELEASE ORAL at 08:36

## 2023-11-25 RX ADMIN — HEPARIN SODIUM 5000 UNITS: 5000 INJECTION, SOLUTION INTRAVENOUS; SUBCUTANEOUS at 16:15

## 2023-11-25 RX ADMIN — Medication 400 MG: at 08:36

## 2023-11-25 RX ADMIN — TRAZODONE HYDROCHLORIDE 25 MG: 50 TABLET ORAL at 21:05

## 2023-11-25 RX ADMIN — POLYETHYLENE GLYCOL 3350 17 G: 17 POWDER, FOR SOLUTION ORAL at 08:36

## 2023-11-25 ASSESSMENT — ACTIVITIES OF DAILY LIVING (ADL)
ADLS_ACUITY_SCORE: 38

## 2023-11-25 NOTE — PROGRESS NOTES
[facility]    Hospitalist Progress Note    Date of Service (when I saw the patient): 11/24/2023  Admit date: 10/25/2023    Interval History   Full details of events over last 24 hours outlined below.   Per nursing notes was incontinent to stool again yesterday with soft bowel movement.  Otherwise no acute events.  Remains afebrile hemodynamically stable.  Blood pressure moderately elevated at times oxygen normal on room air.  Creatinine continues to improve 3.57 hemoglobin stable.  Labs unremarkable  Continues to have good urine output.  1500 cc over the last 24 hours    Assessment & Plan   Mya Hui is a 83 year old female with PMH Scoliosis, Lumbar radiculopathy, HTN, HLP, DM2, Hypothyroidism, CKD stage 3, Morbid obesity, GERD, Sliding hiatal hernia, Overactive bladder, OA, Chronic right shoulder pain/rotator cuff arthropathy, Migraines, Asymptomatic carotid artery stenosis who was admitted on 10/25/2023 with an elective T11 to pelvis robotic decompression and fusion.    Surgery was performed on 10/25/23 under general anesthesia.  Surgery was complicated with development of arrhythmia with significant hypotension and noted 3.5 L blood loss per Op Note and Anesthesia postprocedure evaluation note.  Patient received IV fluids, 6 units of pRBCs and 2 units of FFP intra-op.  ST changes were noted intraoperatively.  Patient remained intubated and started on Levophed and was transferred to the ICU due to hemorrhagic shock.  She required vasopressor support with Levophed until 10/27. Transferred out of the ICU with hospitalist comanging since 11/03     S/p elective T11 to pelvis robotic decompression and fusion on 10/25  Acute blood loss anemia with post-op hemorrhagic shock, resolved  Shocked liver-resolved  Type 2 MI due to hemorrhagic shock, improved  Surgery was complicated with development of arrhythmia with significant hypotension and noted 3.5 L blood loss per Op Note and  Anesthesia postprocedure evaluation note. Patient received IV fluids, 6 units of pRBCs and 2 units of FFP intra-op. Patient received additional 2 units FFP, 10 units of cryo and 1 unit of PLT while in the ICU. LFTs continue to improve 11/5-11/7.  Doing fine  PT/OT rec ARU     Encephalopathy, multifactorial, RESOLVED  Incidental finding of left basal ganglia restricted diffusion  Patient was noted to be encephalopathic for which Vascular Neurology was consulted.   * Brain MRI was completed and noted a left basal ganglia small focus of restricted diffusion but not a cause for encephalopathy and felt it was likely multifactorial.    * EEG monitor was completed and negative and subsequently stopped.    * Suspect due to uremic encephalopathy and sepsis.  * Encephalopathy improved 11/6    Delirium prevention:  Reorient patient at each interaction.  Keep room lights on and blinds open during day (8am-8pm), low lights 8pm-8am.  Minimize interruptions of sleep at night (consolidate vitals, nursing assessments, medications).  Avoid sedating medications as able.    Vascular Neurology recommended repeat brain MRI in 2-3 months     Severe LISA secondary to ATN S/p hemodialysis,   Anion gap metabolic acidosis due to above  CKD stage 3a  Hypoalbuminemia  Anasarca  Baseline creatinine between 1.1-1.4 with GFR in the 40-50's.   Due to development of LISA-D Nephrology was consulted and tunnel dialysis catheter placed 10/30 and has been undergoing HD Mon, Wed, Friday  Last hemodialysis was 11/17  Continued drop in creatinine with good urine output.  No more dialysis needed.  Hemodialysis catheter removed on 11/24.  Spicer placed for urinary retention.  New since surgery. Likely related to medications and decreased mobility.   Continue Spicer until more mobile with urology follow-up and voiding trial.     Recent Labs   Lab 11/24/23  0746 11/23/23  0718 11/22/23  0740 11/21/23  0759 11/20/23  0955 11/19/23  0653   CR 3.57* 4.02* 4.40* 4.62*  4.50* 4.22*       Acute blood loss anemia   Hb stable ~8 since her initial hemorrhage. 11/17- Hb lower at 7.6 No bleeding noted. Nephrology planning to give EPO   Hb now  8.2--8.7  monitor with intermittent labs    Recent Labs   Lab 11/24/23  0746 11/23/23  0718 11/22/23  0740 11/21/23  0759 11/20/23  0955 11/19/23  0653   HGB 8.7* 7.9* 8.5* 9.1* 8.9* 8.7*       Aspiration/ventilator associated PNA (Enterobacter, stenotrophomonas and E. Coli)  Acute hypoxic respiratory failure RESOLIVED  Leukocytosis due to above  Extubated 11/2. Patient has also developed aspiration/ventilator associated pneumonia with sputum Cx growing Enterobacter, stenotrophomonas and E. coli and was initially started on Zoysn and switched to Meropenem on 11/1/2023.  Currently continued on Meropenem and awaiting final cultures. Procal 3.   * completed 14 days course of antibiotics 11/14  Encourage use of IS/Flutter valve.       Dysphagia-improving  Moderate malnutrition  Failed SLP assessment. Bedside feeding tube placement coiling due to hiatal hernia. Started PPN     Dysphagia diet per speech  Nutrition consult appreciated on supplements.    Constipation, with incontinence to stool following laxative use  Due to decreased mobility unable to make it to bathroom.  Patient is understandably distressed by this.  Discussed with both patient and RN avoidance of further constipation, or there will be vicious cycle of constipation > diarrhea post laxative use  Continue senna and MiraLAX to ensure daily bowel movements but hold for frequent loose stools. Note miralax held on 11/24/23. Continues on senna.      Scoliosis  Lumbar radiculopathy  S/p T11 to pelvis robotic decompression and fusion  Neurosurgery is managing.  PT/OT  Plan to discharge to ARU     HTN  NSVT  Continue diltiazem  mg po daily and hydralazine 10 mg po q8h   Holding PTA lisinopril for ATN     HLP  Holding PTA pravastatin 40 mg/d to reduce pill burden      Type 2 DM, controlled,  A1c 6.3 on 10/25  Stress induced hyperglycemia  - no insulin needs recently, possible due to antibiotic use  - consider reduction in glucose monitoring    Recent Labs   Lab 11/24/23  1732 11/24/23  1242 11/24/23  0916 11/24/23  0746 11/24/23  0156 11/23/23  2128   * 116* 204* 121* 99 111*          Hypothyroidism   - continue PTA synthroid      GERD  Sliding hiatal hernia  - PTA PPI     OA  Chronic right shoulder pain/rotator cuff arthropathy  Chronic Pain  - Pain/analgesic management per Neurosurgery.    - As of 11/24, last oxycodone use was 11/17     Obesity   Suspected ANAYELI  Body mass index is 37.64 kg/m .  Increase in all-cause morbidity and mortality.   - Follow up with PCP regarding ongoing management.    - Monitor O2 saturations.    - Recommend outpatient sleep study.       Overactive bladder  - Holding PTA Ditropan XL 10 mg/d.       Migraines: Pain/analgesic management per Neurosurgery.       Asymptomatic carotid artery stenosis.     Hypomagnesemia   - RN replacement protocols  Recent Labs   Lab 11/24/23  0746 11/23/23  0718 11/22/23  0740 11/21/23  0759 11/20/23  0955 11/19/23  0653 11/18/23  0600   POTASSIUM 3.6 3.6 3.5 3.8 3.5 3.9 4.1   MAG  --   --   --   --  1.9  --   --    PHOS 3.9 3.7 4.3 4.6* 4.0 4.8* 4.2       Clinically Significant Risk Factors              # Hypoalbuminemia: Lowest albumin = 2.5 g/dL at 11/1/2023  4:19 AM, will monitor as appropriate     # Hypertension: Noted on problem list         # Moderate Malnutrition: based on nutrition assessment      # Financial/Environmental Concerns: none           PT/OT: Recommending ARU  Diet: Orders Placed This Encounter      Combination Diet Minced and Moist Diet (level 5); Slightly Thick (level 1)     IVF:   DVT Prophylaxis: Heparin subcu  Spicer Catheter: PRESENT, indication: Retention, Strict 1-2 Hour I&O    Lines: Tunneled right IJ line for dialysis, midline left brachial  Cardiac Monitoring: None   Full Code  Disposition: Anticipated  discharge to ARU vs TCU once placement found.  Patient is medically stable  Communication: Discussed with patient and family at bedside, including daughter, Olivia on 11/24/23    Lauren Omer MD    Hospitalist Service  [facility]  Securely message with the Vocera Web Console (learn more here)  Text page via [employer] Paging/Directory      -Data reviewed today: I reviewed all new labs and imaging results over the last 24 hours. I personally reviewed no images or EKG's today.    Physical Exam   Temp: 98.9  F (37.2  C) Temp src: Oral BP: 124/67 Pulse: 88   Resp: 17 SpO2: 95 % O2 Device: None (Room air)    Vitals:    11/19/23 0500 11/20/23 0251 11/23/23 0634   Weight: 96 kg (211 lb 10.3 oz) 99 kg (218 lb 4.1 oz) 93.7 kg (206 lb 9.1 oz)     Vital Signs with Ranges  Temp:  [97.6  F (36.4  C)-98.9  F (37.2  C)] 98.9  F (37.2  C)  Pulse:  [71-88] 88  Resp:  [16-17] 17  BP: (124-176)/(67-87) 124/67  SpO2:  [95 %-98 %] 95 %  I/O last 3 completed shifts:  In: 900 [P.O.:900]  Out: 1150 [Urine:1150]    Today's Exam  Constitutional:  NAD,   Neuropsyche: alert and oriented, answers questions appropriately.   Respiratory:  Breathing comfortably.   Cardiovascular:   trace edema.  Skin/Integumen:  No acute rash or sign of bleeding.     Medications   All medications reviewed on 11/24/23    dextrose        - MEDICATION INSTRUCTIONS for Dialysis Patients -   Does not apply See Admin Instructions    artificial saliva  2 spray Swish & Spit 4x Daily    B and C vitamin Complex with folic acid  5 mL Oral or Feeding Tube Daily    diltiazem ER COATED BEADS  180 mg Oral Daily    heparin ANTICOAGULANT  5,000 Units Subcutaneous Q8H    hydrALAZINE  10 mg Oral or Feeding Tube Q8H    levothyroxine  100 mcg Oral or NG Tube QAM AC    magnesium oxide  400 mg Oral Daily    nystatin   Topical BID    pantoprazole  40 mg Oral or Feeding Tube QAM AC    Or    pantoprazole  40 mg Intravenous QAM AC    polyethylene glycol  17 g Oral or  NG Tube Daily    senna-docusate  1 tablet Oral or NG Tube BID    sodium chloride (PF)  3 mL Intracatheter Q8H    sodium chloride (PF)  9 mL Intracatheter During Dialysis/CRRT (from stock)    traZODone  25 mg Oral At Bedtime     PRN Meds: acetaminophen, bisacodyl, calcium carbonate, dextrose, glucose **OR** dextrose **OR** glucagon, hydrOXYzine, ipratropium - albuterol 0.5 mg/2.5 mg/3 mL, labetalol, lidocaine 4%, lidocaine (buffered or not buffered), magnesium hydroxide, melatonin, miconazole, naloxone **OR** naloxone **OR** naloxone **OR** naloxone, nitroGLYcerin, ondansetron **OR** ondansetron, oxyCODONE IR **OR** oxyCODONE, prochlorperazine **OR** prochlorperazine, sodium chloride (PF), sodium chloride 0.9%    Data   Recent Labs   Lab 11/24/23  1732 11/24/23  1242 11/24/23  0916 11/24/23  0746 11/23/23  0859 11/23/23  0718 11/22/23  0805 11/22/23  0740 11/20/23  1729 11/20/23  0955   WBC  --   --   --  6.1  --  5.7  --  6.3   < > 5.6   HGB  --   --   --  8.7*  --  7.9*  --  8.5*   < > 8.9*   MCV  --   --   --  94  --  92  --  95   < > 95   PLT  --   --   --  264  --  239  --  264   < > 224   NA  --   --   --  139  --  140  --  136   < > 134*   POTASSIUM  --   --   --  3.6  --  3.6  --  3.5   < > 3.5   CHLORIDE  --   --   --  102  --  103  --  99   < > 98   CO2  --   --   --  24  --  24  --  25   < > 25   BUN  --   --   --  37.3*  --  42.6*  --  43.7*   < > 34.1*   CR  --   --   --  3.57*  --  4.02*  --  4.40*   < > 4.50*   ANIONGAP  --   --   --  13  --  13  --  12   < > 11   TERESA  --   --   --  9.0  --  8.7*  --  9.2   < > 9.0   * 116* 204* 121*   < > 110*   < > 109*   < > 167*  167*   ALBUMIN  --   --   --  3.3*  --  3.0*  --  3.3*   < > 3.3*   PROTTOTAL  --   --   --   --   --   --   --   --   --  6.1*   BILITOTAL  --   --   --   --   --   --   --   --   --  0.4   ALKPHOS  --   --   --   --   --   --   --   --   --  127   ALT  --   --   --   --   --   --   --   --   --  17   AST  --   --   --   --   --    --   --   --   --  29    < > = values in this interval not displayed.       Recent Results (from the past 24 hour(s))   IR CVC Tunnel Removal Right    Narrative    [facility]    EXAM: TUNNELED DIALYSIS CATHETER REMOVAL    LOCATION: [facility]    CLINICAL HISTORY: 83-year-old female with recovering renal function  who no longer needs the dialysis catheter presents for catheter  removal.    PROCEDURES PERFORMED:  1. Catheter removal with gentle traction and blunt dissection.    MODERATE SEDATION: None    ADDITIONAL MEDICATIONS: None     CONTRAST: None    FLUOROSCOPIC TIME: None   CUMULATIVE AIR KERMA/DOSE:  None    STERILE BARRIER TECHNIQUE: Maximal Sterile Barrier Technique Utilized:  Cap AND mask AND sterile gown AND sterile gloves AND sterile full body  drape AND hand hygiene AND skin preparation 2% chlorhexidine for  cutaneous antisepsis (or acceptable alternative antiseptics).    Sterile Ultrasound Technique Utilized ?Sterile gel AND sterile probe  covers.    UNIVERSAL PROTOCOL: Standard universal protocol per facility  guidelines was followed. See EMR for documentation.     TECHNIQUE: The right neck was prepped and draped in the usual, sterile  fashion. One percent lidocaine was utilized for local anesthesia.  The  cuff of the catheter was dissected free from the surrounding soft  tissues.  The catheter was removed with gentle traction. Pressure was  applied until hemostasis was obtained and the site was covered with a  sterile dressing. The patient tolerated the procedure well without any  immediate complications.     FINDINGS:  The catheter was removed in its entirety, including the polyester  cuff.   The skin insertion site is clean and dry.      Impression    IMPRESSION:  1. Removal of tunneled catheter from the internal jugular vein.                CARLA YUN MD         SYSTEM ID:  [id number]

## 2023-11-25 NOTE — PROGRESS NOTES
AO x4. VSS ex hypertensive. Spicer in place, adequate output. Pt did not get OOB during shift. BS checks. Turned and repo Q2hrs. Denies pain. On thickened liquids/minced and moist diet.

## 2023-11-25 NOTE — PROGRESS NOTES
patient is alert and oriented  x 4,  forgetful atimes. Denies pain.BS check.Redness on the coccyx, maplex applied. Tolerated combination diet minced and moist diet.Reposition Q2. CVC tunnel removal.Brace on OOB.Spicer catheter in place.See notes for discharge plan.continue monitoring.

## 2023-11-25 NOTE — PROVIDER NOTIFICATION
Paged Dr. Omer, patient has midline in place no blood return can we please get alteplase ordered? Or should we pull midline since she has a working peripheral.

## 2023-11-25 NOTE — PROGRESS NOTES
Care Management Follow Up    Length of Stay (days): 31    Expected Discharge Date: 11/27/2023     Concerns to be Addressed: discharge planning     Patient plan of care discussed at interdisciplinary rounds: Yes    Anticipated Discharge Disposition: ARU vs Skilled Nursing Facility, Transitional Care     Anticipated Discharge Services: Transportation Services  Anticipated Discharge DME: Other (see comment) (to be determined)    Patient/family educated on Medicare website which has current facility and service quality ratings:    Education Provided on the Discharge Plan: Yes  Patient/Family in Agreement with the Plan: unable to assess (discussed mutiple discharge choices)    Referrals Placed by CM/SW: External Care Coordination, Specialty Providers  Private pay costs discussed: Not applicable    Additional Information:  Spoke with patient regarding her request to go to [facility] at discharge as it is close to her family.  Pt tearful during visit.  CC explained that they declined as there was no availability when referral was sent and other referrals are pending from other facilities.  CM team to update pt when accepting facility found.    CC called [facility] to discuss bed availability and update on pt status, as pt no longer requiring dialysis at discharge.  No one in office over the weekend.      Elaina Almanzar RN, BS  Care Coordinator  [email or web address]

## 2023-11-26 ENCOUNTER — APPOINTMENT (OUTPATIENT)
Dept: PHYSICAL THERAPY | Facility: CLINIC | Age: 83
DRG: 453 | End: 2023-11-26
Attending: NEUROLOGICAL SURGERY
Payer: COMMERCIAL

## 2023-11-26 ENCOUNTER — APPOINTMENT (OUTPATIENT)
Dept: SPEECH THERAPY | Facility: CLINIC | Age: 83
DRG: 453 | End: 2023-11-26
Attending: NEUROLOGICAL SURGERY
Payer: COMMERCIAL

## 2023-11-26 ENCOUNTER — APPOINTMENT (OUTPATIENT)
Dept: OCCUPATIONAL THERAPY | Facility: CLINIC | Age: 83
DRG: 453 | End: 2023-11-26
Attending: NEUROLOGICAL SURGERY
Payer: COMMERCIAL

## 2023-11-26 LAB
ALBUMIN SERPL BCG-MCNC: 3.4 G/DL (ref 3.5–5.2)
ANION GAP SERPL CALCULATED.3IONS-SCNC: 13 MMOL/L (ref 7–15)
BUN SERPL-MCNC: 30.8 MG/DL (ref 8–23)
CALCIUM SERPL-MCNC: 9.2 MG/DL (ref 8.8–10.2)
CHLORIDE SERPL-SCNC: 100 MMOL/L (ref 98–107)
CREAT SERPL-MCNC: 2.93 MG/DL (ref 0.51–0.95)
DEPRECATED HCO3 PLAS-SCNC: 25 MMOL/L (ref 22–29)
EGFRCR SERPLBLD CKD-EPI 2021: 16 ML/MIN/1.73M2
GLUCOSE BLDC GLUCOMTR-MCNC: 109 MG/DL (ref 70–99)
GLUCOSE BLDC GLUCOMTR-MCNC: 117 MG/DL (ref 70–99)
GLUCOSE BLDC GLUCOMTR-MCNC: 125 MG/DL (ref 70–99)
GLUCOSE BLDC GLUCOMTR-MCNC: 182 MG/DL (ref 70–99)
GLUCOSE BLDC GLUCOMTR-MCNC: 85 MG/DL (ref 70–99)
GLUCOSE SERPL-MCNC: 185 MG/DL (ref 70–99)
PHOSPHATE SERPL-MCNC: 3 MG/DL (ref 2.5–4.5)
POTASSIUM SERPL-SCNC: 3.6 MMOL/L (ref 3.4–5.3)
SODIUM SERPL-SCNC: 138 MMOL/L (ref 135–145)

## 2023-11-26 PROCEDURE — 250N000013 HC RX MED GY IP 250 OP 250 PS 637: Performed by: INTERNAL MEDICINE

## 2023-11-26 PROCEDURE — 120N000001 HC R&B MED SURG/OB

## 2023-11-26 PROCEDURE — 84478 ASSAY OF TRIGLYCERIDES: CPT | Performed by: HOSPITALIST

## 2023-11-26 PROCEDURE — 36415 COLL VENOUS BLD VENIPUNCTURE: CPT | Performed by: HOSPITALIST

## 2023-11-26 PROCEDURE — 97116 GAIT TRAINING THERAPY: CPT | Mod: GP | Performed by: PHYSICAL THERAPIST

## 2023-11-26 PROCEDURE — C9113 INJ PANTOPRAZOLE SODIUM, VIA: HCPCS | Mod: JZ | Performed by: NEUROLOGICAL SURGERY

## 2023-11-26 PROCEDURE — 92526 ORAL FUNCTION THERAPY: CPT | Mod: GN

## 2023-11-26 PROCEDURE — 80069 RENAL FUNCTION PANEL: CPT | Performed by: INTERNAL MEDICINE

## 2023-11-26 PROCEDURE — 250N000013 HC RX MED GY IP 250 OP 250 PS 637: Performed by: HOSPITALIST

## 2023-11-26 PROCEDURE — 250N000011 HC RX IP 250 OP 636: Mod: JZ | Performed by: INTERNAL MEDICINE

## 2023-11-26 PROCEDURE — 97110 THERAPEUTIC EXERCISES: CPT | Mod: GO

## 2023-11-26 PROCEDURE — 250N000013 HC RX MED GY IP 250 OP 250 PS 637: Performed by: NURSE PRACTITIONER

## 2023-11-26 PROCEDURE — 99233 SBSQ HOSP IP/OBS HIGH 50: CPT | Performed by: INTERNAL MEDICINE

## 2023-11-26 PROCEDURE — 97530 THERAPEUTIC ACTIVITIES: CPT | Mod: GP | Performed by: PHYSICAL THERAPIST

## 2023-11-26 PROCEDURE — 250N000011 HC RX IP 250 OP 636: Mod: JZ | Performed by: NEUROLOGICAL SURGERY

## 2023-11-26 PROCEDURE — 36415 COLL VENOUS BLD VENIPUNCTURE: CPT | Performed by: INTERNAL MEDICINE

## 2023-11-26 PROCEDURE — 99207 PR CDG-CUT & PASTE-POTENTIAL IMPACT ON LEVEL: CPT | Performed by: INTERNAL MEDICINE

## 2023-11-26 PROCEDURE — 250N000013 HC RX MED GY IP 250 OP 250 PS 637: Performed by: STUDENT IN AN ORGANIZED HEALTH CARE EDUCATION/TRAINING PROGRAM

## 2023-11-26 RX ADMIN — HEPARIN SODIUM 5000 UNITS: 5000 INJECTION, SOLUTION INTRAVENOUS; SUBCUTANEOUS at 23:08

## 2023-11-26 RX ADMIN — DILTIAZEM HYDROCHLORIDE 180 MG: 180 CAPSULE, COATED, EXTENDED RELEASE ORAL at 08:16

## 2023-11-26 RX ADMIN — Medication 400 MG: at 08:16

## 2023-11-26 RX ADMIN — POLYETHYLENE GLYCOL 3350 17 G: 17 POWDER, FOR SOLUTION ORAL at 08:16

## 2023-11-26 RX ADMIN — Medication 5 ML: at 20:28

## 2023-11-26 RX ADMIN — HYDRALAZINE HYDROCHLORIDE 10 MG: 10 TABLET ORAL at 06:32

## 2023-11-26 RX ADMIN — TRAZODONE HYDROCHLORIDE 25 MG: 50 TABLET ORAL at 23:08

## 2023-11-26 RX ADMIN — LEVOTHYROXINE SODIUM 100 MCG: 100 TABLET ORAL at 06:32

## 2023-11-26 RX ADMIN — PANTOPRAZOLE SODIUM 40 MG: 40 INJECTION, POWDER, FOR SOLUTION INTRAVENOUS at 06:33

## 2023-11-26 RX ADMIN — HYDRALAZINE HYDROCHLORIDE 10 MG: 10 TABLET ORAL at 23:08

## 2023-11-26 RX ADMIN — SENNOSIDES AND DOCUSATE SODIUM 1 TABLET: 8.6; 5 TABLET ORAL at 23:08

## 2023-11-26 RX ADMIN — HEPARIN SODIUM 5000 UNITS: 5000 INJECTION, SOLUTION INTRAVENOUS; SUBCUTANEOUS at 00:43

## 2023-11-26 RX ADMIN — HEPARIN SODIUM 5000 UNITS: 5000 INJECTION, SOLUTION INTRAVENOUS; SUBCUTANEOUS at 08:16

## 2023-11-26 RX ADMIN — Medication 2 SPRAY: at 20:29

## 2023-11-26 RX ADMIN — MELATONIN 5 MG TABLET 10 MG: at 00:41

## 2023-11-26 RX ADMIN — NYSTATIN: 100000 CREAM TOPICAL at 20:29

## 2023-11-26 RX ADMIN — HYDRALAZINE HYDROCHLORIDE 10 MG: 10 TABLET ORAL at 16:07

## 2023-11-26 RX ADMIN — HEPARIN SODIUM 5000 UNITS: 5000 INJECTION, SOLUTION INTRAVENOUS; SUBCUTANEOUS at 16:07

## 2023-11-26 RX ADMIN — SENNOSIDES AND DOCUSATE SODIUM 1 TABLET: 8.6; 5 TABLET ORAL at 08:16

## 2023-11-26 ASSESSMENT — ACTIVITIES OF DAILY LIVING (ADL)
ADLS_ACUITY_SCORE: 34
ADLS_ACUITY_SCORE: 38
ADLS_ACUITY_SCORE: 34
ADLS_ACUITY_SCORE: 34

## 2023-11-26 NOTE — PROGRESS NOTES
Pt is Aox3-4, up A2 vaughn steady. Minced ad moist diet with thickened liquids. BG checks 116/167/119 no insulin given. Turn and reposition q2hrs was up in chair most of shift. Denies pain just states discomfort. TLSO brace. Pelvic rash, powder in bin. Discharge to TCU pending.

## 2023-11-26 NOTE — PROGRESS NOTES
[facility]    Hospitalist Progress Note    Date of Service (when I saw the patient): 11/26/2023  Admit date: 10/25/2023    Interval History   Full details of events over last 24 hours outlined below.   Delmi is been very emotional about the fact that they could not accept her at [facility] which should be close to her family.  Social work working placing referrals to TCU and now ARU that she is off dialysis, based on discussions with daughter.  She is sleeping during my visit.  Per nurse no acute events  She has been afebrile hemodynamically stable with normal blood pressure today oxygenation normal on room air.   Creatinine continues to improve electrolytes are normal.    Assessment & Plan   Mya Hui is a 83 year old female with PMH Scoliosis, Lumbar radiculopathy, HTN, HLP, DM2, Hypothyroidism, CKD stage 3, Morbid obesity, GERD, Sliding hiatal hernia, Overactive bladder, OA, Chronic right shoulder pain/rotator cuff arthropathy, Migraines, Asymptomatic carotid artery stenosis who was admitted on 10/25/2023 with an elective T11 to pelvis robotic decompression and fusion.    Surgery was performed on 10/25/23 under general anesthesia.  Surgery was complicated with development of arrhythmia with significant hypotension and noted 3.5 L blood loss per Op Note and Anesthesia postprocedure evaluation note.  Patient received IV fluids, 6 units of pRBCs and 2 units of FFP intra-op.  ST changes were noted intraoperatively.  Patient remained intubated and started on Levophed and was transferred to the ICU due to hemorrhagic shock.  She required vasopressor support with Levophed until 10/27. Transferred out of the ICU with hospitalist comanging since 11/03     Scoliosis  Lumbar radiculopathy  S/p T11 to pelvis robotic decompression and fusion on 10/25  Acute blood loss anemia with post-op hemorrhagic shock, resolved  Shocked liver-resolved  Type 2 MI due to hemorrhagic shock,  improved  Surgery was complicated with development of arrhythmia with significant hypotension and noted 3.5 L blood loss per Op Note and Anesthesia postprocedure evaluation note. Patient received IV fluids, 6 units of pRBCs and 2 units of FFP intra-op. Patient received additional 2 units FFP, 10 units of cryo and 1 unit of PLT while in the ICU. LFTs continue to improve 11/5-11/7.    Neurosurgery managing  Doing fine  PT/OT rec ARU.   SW attempting to find placement at TCU or ARU.   As of 11/26/23, last oxycodone use was 11/17     Encephalopathy, multifactorial, RESOLVED  Incidental finding of left basal ganglia restricted diffusion  Patient was noted to be encephalopathic for which Vascular Neurology was consulted.   * Brain MRI was completed and noted a left basal ganglia small focus of restricted diffusion but not a cause for encephalopathy and felt it was likely multifactorial.    * EEG monitor was completed and negative and subsequently stopped.    * Suspect due to uremic encephalopathy and sepsis.  * Encephalopathy improved 11/6    Delirium prevention:  Reorient patient at each interaction.  Keep room lights on and blinds open during day (8am-8pm), low lights 8pm-8am.  Minimize interruptions of sleep at night (consolidate vitals, nursing assessments, medications).  Avoid sedating medications as able.    Vascular Neurology recommended repeat brain MRI in 2-3 months     Severe LISA secondary to ATN S/p hemodialysis,   Anion gap metabolic acidosis due to above  CKD stage 3a  Hypoalbuminemia  Anasarca  Baseline creatinine between 1.1-1.4 with GFR in the 40-50's.   Due to development of LISA-D Nephrology was consulted and tunnel dialysis catheter placed 10/30 and has been undergoing HD Mon, Wed, Friday  Last hemodialysis was 11/17  Continued drop in creatinine with good urine output.  No more dialysis needed.  Hemodialysis catheter removed on 11/24.  Spicer placed for urinary retention.  New since surgery. Likely  related to decreased mobility, no longer requiring narcotics for pain.   Continue Spicer until more mobile.     Recent Labs   Lab 11/26/23  1302 11/25/23  0630 11/24/23  0746 11/23/23  0718 11/22/23  0740 11/21/23  0759   CR 2.93* 3.26* 3.57* 4.02* 4.40* 4.62*       Acute blood loss anemia   Hb stable ~8 since her initial hemorrhage. 11/17- Hb lower at 7.6 No bleeding noted. Nephrology planning to give EPO   Hb now  8.2--8.7  monitor with intermittent labs    Recent Labs   Lab 11/25/23  0630 11/24/23  0746 11/23/23  0718 11/22/23  0740 11/21/23  0759 11/20/23  0955   HGB 8.9* 8.7* 7.9* 8.5* 9.1* 8.9*       Aspiration/ventilator associated PNA (Enterobacter, stenotrophomonas and E. Coli)  Acute hypoxic respiratory failure RESOLVED  Leukocytosis due to above  Extubated 11/2. Patient has also developed aspiration/ventilator associated pneumonia with sputum Cx growing Enterobacter, stenotrophomonas and E. coli and was initially started on Zoysn and switched to Meropenem on 11/1/2023.  Currently continued on Meropenem and awaiting final cultures. Procal 3.   * completed 14 days course of antibiotics 11/14  Encourage use of IS/Flutter valve.       Dysphagia-improving  Moderate malnutrition  Failed SLP assessment. Bedside feeding tube placement coiling due to hiatal hernia. Started PPN     Dysphagia diet per speech  Nutrition consult appreciated on supplements.    Constipation, with incontinence to stool following laxative use  Due to decreased mobility unable to make it to bathroom.  Patient is understandably distressed by this.  Continue senna and MiraLAX to ensure daily bowel movements but hold for frequent loose stools.     HTN  NSVT  Continue diltiazem  mg po daily and hydralazine 10 mg po q8h   Holding PTA lisinopril for ATN     HLP  Holding PTA pravastatin 40 mg/d to reduce pill burden      Type 2 DM, controlled, A1c 6.3 on 10/25  Stress induced hyperglycemia  Blood sugars have been very well-controlled,  without insulin needs but now note blood sugars of 180 on afternoon of 11/26.  No acute clinical changes suggest acute distress.  Given recent rise will continue to follow blood sugars 3 times daily before meals    Recent Labs   Lab 11/26/23  1302 11/26/23  1219 11/26/23  0609 11/26/23  0207 11/25/23  2202 11/25/23  1702   * 182* 109* 117* 140* 119*          Hypothyroidism   continue PTA synthroid      GERD  Sliding hiatal hernia  PTA PPI     OA  Chronic right shoulder pain/rotator cuff arthropathy  Chronic Pain  Pain/analgesic management per Neurosurgery.    As of 11/24, last oxycodone use was 11/17     Obesity   Suspected ANAYELI  Body mass index is 37.64 kg/m .  Increase in all-cause morbidity and mortality.   - Follow up with PCP regarding ongoing management.    - Monitor O2 saturations.    - Recommend outpatient sleep study.       Overactive bladder  - Holding PTA Ditropan XL 10 mg/d.       Migraines: Pain/analgesic management per Neurosurgery.       Asymptomatic carotid artery stenosis.     Hypomagnesemia   - RN replacement protocols  Recent Labs   Lab 11/26/23  1302 11/25/23  0630 11/24/23  0746 11/23/23  0718 11/22/23  0740 11/21/23  0759 11/20/23  0955   POTASSIUM 3.6 3.9 3.6 3.6 3.5 3.8 3.5   MAG  --   --   --   --   --   --  1.9   PHOS 3.0 3.3 3.9 3.7 4.3 4.6* 4.0       Clinically Significant Risk Factors              # Hypoalbuminemia: Lowest albumin = 2.5 g/dL at 11/1/2023  4:19 AM, will monitor as appropriate     # Hypertension: Noted on problem list         # Moderate Malnutrition: based on nutrition assessment      # Financial/Environmental Concerns: none           PT/OT: Recommending ARU  Diet: Orders Placed This Encounter      Combination Diet Soft and Bite Sized Diet (level 6); Thin Liquids (level 0); Thin Liquids (level 0) (one small sip at a time when alert and sitting fully upright. No straws.)     IVF:   DVT Prophylaxis: Heparin subcu  Spicer Catheter: PRESENT, indication: Retention, Strict  1-2 Hour I&O    Lines: Tunneled right IJ line for dialysis, midline left brachial > removed on 11/25/23   Cardiac Monitoring: None   Full Code  Disposition: Anticipated discharge to ARU vs TCU once placement found.  Patient is medically stable  Communication: Discussed with RN on 11/26/23   Lauren Omer MD    Hospitalist Service  [facility]  Securely message with the Vocera Web Console (learn more here)  Text page via [employer] Paging/Directory      -Data reviewed today: I reviewed all new labs and imaging results over the last 24 hours. I personally reviewed no images or EKG's today.    Physical Exam   Temp: 98.5  F (36.9  C) Temp src: Oral BP: 131/71 Pulse: 77   Resp: 20 SpO2: 96 % O2 Device: None (Room air)    Vitals:    11/25/23 0856 11/25/23 0857 11/26/23 0607   Weight: 93.7 kg (206 lb 9.1 oz) 93.7 kg (206 lb 9.1 oz) 93.5 kg (206 lb 2.1 oz)     Vital Signs with Ranges  Temp:  [98.3  F (36.8  C)-99.5  F (37.5  C)] 98.5  F (36.9  C)  Pulse:  [77-92] 77  Resp:  [18-20] 20  BP: (126-160)/(71-84) 131/71  SpO2:  [96 %] 96 %  I/O last 3 completed shifts:  In: 480 [P.O.:480]  Out: 1125 [Urine:1125]    Today's Exam  Constitutional:  NAD,   Neuropsyche: sleeping   Respiratory:  Breathing comfortably.   Cardiovascular:   trace edema.  Skin/Integumen:  No acute rash or sign of bleeding.     Medications   All medications reviewed on 11/26/23    dextrose        artificial saliva  2 spray Swish & Spit 4x Daily    B and C vitamin Complex with folic acid  5 mL Oral or Feeding Tube Daily    diltiazem ER COATED BEADS  180 mg Oral Daily    heparin ANTICOAGULANT  5,000 Units Subcutaneous Q8H    hydrALAZINE  10 mg Oral or Feeding Tube Q8H    levothyroxine  100 mcg Oral or NG Tube QAM AC    magnesium oxide  400 mg Oral Daily    nystatin   Topical BID    pantoprazole  40 mg Oral or Feeding Tube QAM AC    Or    pantoprazole  40 mg Intravenous QAM AC    polyethylene glycol  17 g Oral or NG Tube Daily     senna-docusate  1 tablet Oral or NG Tube BID    sodium chloride (PF)  3 mL Intracatheter Q8H    sodium chloride (PF)  9 mL Intracatheter During Dialysis/CRRT (from stock)    traZODone  25 mg Oral At Bedtime     PRN Meds: acetaminophen, bisacodyl, calcium carbonate, dextrose, glucose **OR** dextrose **OR** glucagon, hydrOXYzine, ipratropium - albuterol 0.5 mg/2.5 mg/3 mL, labetalol, lidocaine 4%, lidocaine (buffered or not buffered), magnesium hydroxide, melatonin, miconazole, naloxone **OR** naloxone **OR** naloxone **OR** naloxone, nitroGLYcerin, ondansetron **OR** ondansetron, oxyCODONE IR **OR** oxyCODONE, prochlorperazine **OR** prochlorperazine, sodium chloride (PF), sodium chloride 0.9%    Data   Recent Labs   Lab 11/26/23  1302 11/26/23  1219 11/26/23  0609 11/25/23  0941 11/25/23  0630 11/24/23  0916 11/24/23  0746 11/23/23  0859 11/23/23  0718 11/20/23  1729 11/20/23  0955   WBC  --   --   --   --  7.0  --  6.1  --  5.7   < > 5.6   HGB  --   --   --   --  8.9*  --  8.7*  --  7.9*   < > 8.9*   MCV  --   --   --   --  92  --  94  --  92   < > 95   PLT  --   --   --   --  258  --  264  --  239   < > 224     --   --   --  134*  --  139  --  140   < > 134*   POTASSIUM 3.6  --   --   --  3.9  --  3.6  --  3.6   < > 3.5   CHLORIDE 100  --   --   --  98  --  102  --  103   < > 98   CO2 25  --   --   --  23  --  24  --  24   < > 25   BUN 30.8*  --   --   --  33.5*  --  37.3*  --  42.6*   < > 34.1*   CR 2.93*  --   --   --  3.26*  --  3.57*  --  4.02*   < > 4.50*   ANIONGAP 13  --   --   --  13  --  13  --  13   < > 11   TERESA 9.2  --   --   --  9.0  --  9.0  --  8.7*   < > 9.0   * 182* 109*   < > 105*   < > 121*   < > 110*   < > 167*  167*   ALBUMIN 3.4*  --   --   --  3.2*  --  3.3*  --  3.0*   < > 3.3*   PROTTOTAL  --   --   --   --   --   --   --   --   --   --  6.1*   BILITOTAL  --   --   --   --   --   --   --   --   --   --  0.4   ALKPHOS  --   --   --   --   --   --   --   --   --   --  127   ALT   --   --   --   --   --   --   --   --   --   --  17   AST  --   --   --   --   --   --   --   --   --   --  29    < > = values in this interval not displayed.       No results found for this or any previous visit (from the past 24 hour(s)).

## 2023-11-26 NOTE — PROGRESS NOTES
[facility]  Neurosurgery Daily Progress Note        Assessment & Plan  Procedure(s):  Thoracic 11 to pelvis robotic decompression and fusion   -32 Days Post-Op  Patient doing well.  No new issues.        Awaiting placement.      will reach out to ARU tomorrow to see if bed available and able to accept now that she's not on dialysis.     Zamzam Ya MPAS  [facility] Neurosurgery  [address] [address]    Tel [phone]  Pager [phone]

## 2023-11-26 NOTE — PROGRESS NOTES
Pt is Aox3-4, up A2 vaughn steady. Minced ad moist diet with thickened liquids. BG checks 109/182/85 no insulin given. Turn and reposition q2hrs was up in chair most of shift. Denies pain just states discomfort. TLSO brace. Pelvic rash, powder in bin refused powder, wound care and biotene. Discharge to TCU pending.

## 2023-11-26 NOTE — PROGRESS NOTES
Care Management Follow Up    Length of Stay (days): 32    Expected Discharge Date: 11/27/2023     Concerns to be Addressed: discharge planning     Patient plan of care discussed at interdisciplinary rounds: Yes    Anticipated Discharge Disposition: Home, Home Care, Dialysis Services, Skilled Nursing Facility, Transitional Care     Anticipated Discharge Services: Transportation Services  Anticipated Discharge DME: Other (see comment) (to be determined)    Patient/family educated on Medicare website which has current facility and service quality ratings:    Education Provided on the Discharge Plan: Yes  Patient/Family in Agreement with the Plan: unable to assess (discussed mutiple discharge choices)    Referrals Placed by CM/SW: External Care Coordination, Specialty Providers  Private pay costs discussed: Not applicable    Additional Information:  Writer attended morning charge nurse report. In  is informed that pt is no longer on or needing dialysis.    Writer placed phone call to [facility]. No answer. Writer left voicemail asking for call back.     Writer placed phone call to pt's daughter April. Discussed what pt and family would like for discharge planning. April states she would like either [facility] or [facility] to take pt. She states if [facility] or [facility] cannot take pt then family would like to have pt attend TCU. April states they would prefer [facility] or [facility]. April would like referrals resent to TCU. Writer agreeable to send referral to [facility]. Writer updated Apri of voicemail left for [facility]. April would like writer to resend referral [facility] and call to see if any ability to take pt as this is pt's preferred choice.  Writer agreeable to resend referrals as it appears last time they were sent pt was still requiring dialysis. Writer informed April that if facilities decline pt then would need to  send more referrals. April states understanding. April states that pt has been very emotional lately about where she will go after hospitalization. April asking that care management keep her updated of discharge planning and states that she will update pt of discharge plan as she is able to explain to pt what is going on. April states at times pt is becomes anxious about discharge planning and therefore prefers that she be .  April states no other questions at this time.    Writer resent referral to [facility], [facility]. Writer placed follow up calls to facilities as well. No answers. Writer left voicemails asking for call back.    Writer resent referral to [facility]. Writer then called [facility] and was transferred to admissions who is out for the weekend. Writer left voicemail asking for call back regarding referral.     Writer resent referral to [facility] and then placed phone call.  states that admissions is out for the weekend. Writer left voicemail for CAT team requesting a call back.       Triny Porras, JOSE RW  Social Work  [facility]

## 2023-11-26 NOTE — PROGRESS NOTES
[facility]  Neurosurgery Daily Progress Note        Assessment & Plan  Procedure(s):  Thoracic 11 to pelvis robotic decompression and fusion   -31 Days Post-Op  Patient doing well.  No new issues.          Plan:  - Discharge pending ARU placement, SW following (asked for  to re-evaluate for discharge planning.  - Continue PT/OT, wear brace when out of bed   - Pain control measures as needed   - Routine wound care   - Follow up with NSGY clinic at 6 weeks post op with xray prior    - Appreciate assistance from specialties       BESSY Wallace  [facility] Neurosurgery  [address] [address]     Tel [phone]  Pager [phone]

## 2023-11-26 NOTE — PROGRESS NOTES
AO x4. VSS ex low grade fever. Spicer in place for retention. Pt did not get OOB during shift. R PIV SL.Turned and repo Q2hrs. Denies pain. BS checks. Moist/minced and thickened liquid diet.

## 2023-11-27 ENCOUNTER — APPOINTMENT (OUTPATIENT)
Dept: SPEECH THERAPY | Facility: CLINIC | Age: 83
DRG: 453 | End: 2023-11-27
Attending: NEUROLOGICAL SURGERY
Payer: COMMERCIAL

## 2023-11-27 ENCOUNTER — APPOINTMENT (OUTPATIENT)
Dept: OCCUPATIONAL THERAPY | Facility: CLINIC | Age: 83
DRG: 453 | End: 2023-11-27
Attending: NEUROLOGICAL SURGERY
Payer: COMMERCIAL

## 2023-11-27 ENCOUNTER — APPOINTMENT (OUTPATIENT)
Dept: PHYSICAL THERAPY | Facility: CLINIC | Age: 83
DRG: 453 | End: 2023-11-27
Attending: NEUROLOGICAL SURGERY
Payer: COMMERCIAL

## 2023-11-27 LAB
ALBUMIN SERPL BCG-MCNC: 3.3 G/DL (ref 3.5–5.2)
ANION GAP SERPL CALCULATED.3IONS-SCNC: 11 MMOL/L (ref 7–15)
BUN SERPL-MCNC: 27.4 MG/DL (ref 8–23)
CALCIUM SERPL-MCNC: 9 MG/DL (ref 8.8–10.2)
CHLORIDE SERPL-SCNC: 103 MMOL/L (ref 98–107)
CREAT SERPL-MCNC: 2.66 MG/DL (ref 0.51–0.95)
DEPRECATED HCO3 PLAS-SCNC: 25 MMOL/L (ref 22–29)
EGFRCR SERPLBLD CKD-EPI 2021: 17 ML/MIN/1.73M2
FERRITIN SERPL-MCNC: 130 NG/ML (ref 11–328)
GLUCOSE BLDC GLUCOMTR-MCNC: 100 MG/DL (ref 70–99)
GLUCOSE BLDC GLUCOMTR-MCNC: 104 MG/DL (ref 70–99)
GLUCOSE BLDC GLUCOMTR-MCNC: 115 MG/DL (ref 70–99)
GLUCOSE BLDC GLUCOMTR-MCNC: 138 MG/DL (ref 70–99)
GLUCOSE BLDC GLUCOMTR-MCNC: 163 MG/DL (ref 70–99)
GLUCOSE SERPL-MCNC: 100 MG/DL (ref 70–99)
IRON BINDING CAPACITY (ROCHE): 254 UG/DL (ref 240–430)
IRON SATN MFR SERPL: 31 % (ref 15–46)
IRON SERPL-MCNC: 80 UG/DL (ref 37–145)
PHOSPHATE SERPL-MCNC: 3.7 MG/DL (ref 2.5–4.5)
POTASSIUM SERPL-SCNC: 3.5 MMOL/L (ref 3.4–5.3)
SODIUM SERPL-SCNC: 139 MMOL/L (ref 135–145)
TRIGL SERPL-MCNC: 146 MG/DL

## 2023-11-27 PROCEDURE — 97530 THERAPEUTIC ACTIVITIES: CPT | Mod: GO

## 2023-11-27 PROCEDURE — 250N000013 HC RX MED GY IP 250 OP 250 PS 637: Performed by: HOSPITALIST

## 2023-11-27 PROCEDURE — 250N000013 HC RX MED GY IP 250 OP 250 PS 637: Performed by: NURSE PRACTITIONER

## 2023-11-27 PROCEDURE — 250N000011 HC RX IP 250 OP 636: Mod: JZ | Performed by: NEUROLOGICAL SURGERY

## 2023-11-27 PROCEDURE — 99232 SBSQ HOSP IP/OBS MODERATE 35: CPT | Performed by: INTERNAL MEDICINE

## 2023-11-27 PROCEDURE — C9113 INJ PANTOPRAZOLE SODIUM, VIA: HCPCS | Mod: JZ | Performed by: NEUROLOGICAL SURGERY

## 2023-11-27 PROCEDURE — 250N000013 HC RX MED GY IP 250 OP 250 PS 637: Performed by: INTERNAL MEDICINE

## 2023-11-27 PROCEDURE — 36415 COLL VENOUS BLD VENIPUNCTURE: CPT | Performed by: INTERNAL MEDICINE

## 2023-11-27 PROCEDURE — 97530 THERAPEUTIC ACTIVITIES: CPT | Mod: GP | Performed by: PHYSICAL THERAPIST

## 2023-11-27 PROCEDURE — 82728 ASSAY OF FERRITIN: CPT | Performed by: INTERNAL MEDICINE

## 2023-11-27 PROCEDURE — 250N000013 HC RX MED GY IP 250 OP 250 PS 637: Performed by: STUDENT IN AN ORGANIZED HEALTH CARE EDUCATION/TRAINING PROGRAM

## 2023-11-27 PROCEDURE — 83550 IRON BINDING TEST: CPT | Performed by: INTERNAL MEDICINE

## 2023-11-27 PROCEDURE — 99233 SBSQ HOSP IP/OBS HIGH 50: CPT | Performed by: HOSPITALIST

## 2023-11-27 PROCEDURE — 80069 RENAL FUNCTION PANEL: CPT | Performed by: INTERNAL MEDICINE

## 2023-11-27 PROCEDURE — 120N000001 HC R&B MED SURG/OB

## 2023-11-27 PROCEDURE — 250N000011 HC RX IP 250 OP 636: Mod: JZ | Performed by: INTERNAL MEDICINE

## 2023-11-27 PROCEDURE — 99207 PR CDG-CUT & PASTE-POTENTIAL IMPACT ON LEVEL: CPT | Performed by: HOSPITALIST

## 2023-11-27 PROCEDURE — 92526 ORAL FUNCTION THERAPY: CPT | Mod: GN

## 2023-11-27 RX ADMIN — Medication 5 ML: at 22:27

## 2023-11-27 RX ADMIN — POLYETHYLENE GLYCOL 3350 17 G: 17 POWDER, FOR SOLUTION ORAL at 09:11

## 2023-11-27 RX ADMIN — PANTOPRAZOLE SODIUM 40 MG: 40 INJECTION, POWDER, FOR SOLUTION INTRAVENOUS at 06:48

## 2023-11-27 RX ADMIN — NYSTATIN: 100000 CREAM TOPICAL at 22:30

## 2023-11-27 RX ADMIN — Medication 400 MG: at 09:11

## 2023-11-27 RX ADMIN — Medication 2 SPRAY: at 14:05

## 2023-11-27 RX ADMIN — LEVOTHYROXINE SODIUM 100 MCG: 100 TABLET ORAL at 06:48

## 2023-11-27 RX ADMIN — OXYCODONE HYDROCHLORIDE 5 MG: 5 TABLET ORAL at 14:45

## 2023-11-27 RX ADMIN — HYDRALAZINE HYDROCHLORIDE 10 MG: 10 TABLET ORAL at 06:48

## 2023-11-27 RX ADMIN — Medication 2 SPRAY: at 22:31

## 2023-11-27 RX ADMIN — TRAZODONE HYDROCHLORIDE 25 MG: 50 TABLET ORAL at 22:24

## 2023-11-27 RX ADMIN — MICONAZOLE NITRATE: 2 POWDER TOPICAL at 14:06

## 2023-11-27 RX ADMIN — HEPARIN SODIUM 5000 UNITS: 5000 INJECTION, SOLUTION INTRAVENOUS; SUBCUTANEOUS at 15:30

## 2023-11-27 RX ADMIN — SENNOSIDES AND DOCUSATE SODIUM 1 TABLET: 8.6; 5 TABLET ORAL at 09:11

## 2023-11-27 RX ADMIN — Medication 2 SPRAY: at 09:12

## 2023-11-27 RX ADMIN — HYDRALAZINE HYDROCHLORIDE 10 MG: 10 TABLET ORAL at 14:03

## 2023-11-27 RX ADMIN — NYSTATIN: 100000 CREAM TOPICAL at 14:06

## 2023-11-27 RX ADMIN — Medication 2 SPRAY: at 22:32

## 2023-11-27 RX ADMIN — HEPARIN SODIUM 5000 UNITS: 5000 INJECTION, SOLUTION INTRAVENOUS; SUBCUTANEOUS at 09:11

## 2023-11-27 RX ADMIN — SENNOSIDES AND DOCUSATE SODIUM 1 TABLET: 8.6; 5 TABLET ORAL at 22:25

## 2023-11-27 RX ADMIN — HYDRALAZINE HYDROCHLORIDE 10 MG: 10 TABLET ORAL at 22:25

## 2023-11-27 RX ADMIN — HEPARIN SODIUM 5000 UNITS: 5000 INJECTION, SOLUTION INTRAVENOUS; SUBCUTANEOUS at 23:22

## 2023-11-27 RX ADMIN — DILTIAZEM HYDROCHLORIDE 180 MG: 180 CAPSULE, COATED, EXTENDED RELEASE ORAL at 09:11

## 2023-11-27 ASSESSMENT — ACTIVITIES OF DAILY LIVING (ADL)
ADLS_ACUITY_SCORE: 35
ADLS_ACUITY_SCORE: 34
ADLS_ACUITY_SCORE: 39
ADLS_ACUITY_SCORE: 34
ADLS_ACUITY_SCORE: 34
ADLS_ACUITY_SCORE: 35
ADLS_ACUITY_SCORE: 35
ADLS_ACUITY_SCORE: 34
ADLS_ACUITY_SCORE: 34
ADLS_ACUITY_SCORE: 39
ADLS_ACUITY_SCORE: 35
ADLS_ACUITY_SCORE: 34

## 2023-11-27 NOTE — PLAN OF CARE
Problem: Malnutrition  Goal: Improved Nutritional Intake  Outcome: Progressing  Intervention: Promote and Optimize Oral Intake  Recent Flowsheet Documentation  Taken 11/27/2023 1132 by Nora Ferris  Nutrition Interventions:   supplemental foods provided   supplemental drinks provided   Goal Outcome Evaluation:    Easy to chew diet w/ thin liquids. Eating better, appetite better. Adjusted supplements. Encouraged PO intake    Nora Ferris RD, LD

## 2023-11-27 NOTE — PROGRESS NOTES
Renal Medicine Progress Note                                Mya Hui MRN# [id number]   Age: 83 year old YOB: 1940   Date of Admission: 10/25/2023 Hospital LOS: 33                  Assessment/Plan:     1.  Severe Acute Kidney Injury  -dialysis dependent  -last dialysis 11/17/23   -TDC is out   2.  CKD IIIa.    -pre admission creatinine 1.1 to 1.2 mg/dl range   3.  FEN.  Electrolytes are ok.          No further dialysis  Document Fe studies  Follow labs       Interval History:     In bed  Comfortable  Good UO    Follows    No complaints     ROS:     GENERAL: NAD, No fever,chills  R: NEGATIVE for significant cough or SOB  CV: NEGATIVE for chest pain, palpitations  EXT: no change edema  ROS otherwise negative    Medications and Allergies:     Reviewed    Physical Exam:     Vitals were reviewed  Patient Vitals for the past 8 hrs:   BP Temp Temp src Pulse Resp SpO2   11/27/23 0854 118/86 98.2  F (36.8  C) Oral 86 18 90 %     Wt Readings from Last 4 Encounters:   11/26/23 93.5 kg (206 lb 2.1 oz)   10/19/23 95 kg (209 lb 8 oz)   09/28/23 97.1 kg (214 lb)   09/14/23 97.1 kg (214 lb)     I/O last 3 completed shifts:  In: 360 [P.O.:360]  Out: 1750 [Urine:1750]    Vitals:    11/20/23 0251 11/23/23 0634 11/25/23 0856 11/25/23 0857   Weight: 99 kg (218 lb 4.1 oz) 93.7 kg (206 lb 9.1 oz) 93.7 kg (206 lb 9.1 oz) 93.7 kg (206 lb 9.1 oz)    11/26/23 0607   Weight: 93.5 kg (206 lb 2.1 oz)         GENERAL: awake, alert, follows  HEENT: NC/AT, PERRLA, EOMI, non icteric, pharynx moist without lesion  RESP:  clear anteriorly  CV: RRR, normal S1 S2  ABDOMEN: soft, nontender, no HSM or masses and bowel sounds normal  MS: no clubbing, cyanosis   SKIN: clear without significant rashes or lesions  EXT: warm, no edema    Data:     Recent Labs   Lab 11/27/23  0849 11/27/23  0712 11/27/23  0557 11/27/23  0202 11/26/23  1714 11/26/23  1302 11/25/23  0941 11/25/23  0630 11/24/23  0916 11/24/23  0746   NA  --  139  --   --    --  138  --  134*  --  139   POTASSIUM  --  3.5  --   --   --  3.6  --  3.9  --  3.6   CHLORIDE  --  103  --   --   --  100  --  98  --  102   CO2  --  25  --   --   --  25  --  23  --  24   ANIONGAP  --  11  --   --   --  13  --  13  --  13   * 100* 104* 100*   < > 185*   < > 105*   < > 121*   BUN  --  27.4*  --   --   --  30.8*  --  33.5*  --  37.3*   CR  --  2.66*  --   --   --  2.93*  --  3.26*  --  3.57*   GFRESTIMATED  --  17*  --   --   --  16*  --  14*  --  12*   TERESA  --  9.0  --   --   --  9.2  --  9.0  --  9.0    < > = values in this interval not displayed.         Recent Labs   Lab 11/27/23  0712 11/26/23  1302 11/25/23  0630 11/24/23  0746 11/23/23  0718 11/22/23  0740 11/21/23  0759   CR 2.66* 2.93* 3.26* 3.57* 4.02* 4.40* 4.62*     Recent Labs   Lab 11/27/23  0712 11/26/23  1302 11/25/23  0630 11/24/23  0746 11/23/23  0718   POTASSIUM 3.5 3.6 3.9 3.6 3.6     Recent Labs   Lab 11/27/23  0712 11/26/23  1302 11/25/23  0630 11/24/23  0746   ALBUMIN 3.3* 3.4* 3.2* 3.3*     Recent Labs   Lab 11/27/23 0712 11/26/23  1302 11/25/23  0630 11/24/23  0746 11/23/23  0718 11/22/23  0740   PHOS 3.7 3.0 3.3 3.9 3.7 4.3   HGB  --   --  8.9* 8.7* 7.9* 8.5*     Recent Labs   Lab Test 09/18/17  1458   IRON 46      IRONSAT 12*         G Floyd Guillaume MD    [facility] - Nephrology  [phone]

## 2023-11-27 NOTE — PROGRESS NOTES
[facility]    Neurosurgery Progress Note    Date of Service (when I saw the patient): 11/27/2023     Assessment & Plan     Procedure(s):  Thoracic 11 to pelvis robotic decompression and fusion   -33 Days Post-Op  Chart reviewed. Patient doing well per nursing note, no new issues.     Plan:  - Discharge pending ARU placement, SW following (SW reaching out to ARU today to see if bed available and able to accept now that patient is not on dialysis)  - Continue PT/OT, wear brace when out of bed   - Pain control measures as needed   - Routine wound care   - Follow up with NSGY clinic at 6 weeks post op with xray prior    - Appreciate assistance from specialties        Tami Cheema PA-C  [facility] Neurosurgery  [address] [address]

## 2023-11-27 NOTE — PROGRESS NOTES
Care Management Follow Up    Length of Stay (days): 33    Expected Discharge Date: 11/27/2023     Concerns to be Addressed: discharge planning     Patient plan of care discussed at interdisciplinary rounds: Yes    Anticipated Discharge Disposition: Home, Home Care, Dialysis Services, Skilled Nursing Facility, Transitional Care     Anticipated Discharge Services: Transportation Services  Anticipated Discharge DME: Other (see comment) (to be determined)    Patient/family educated on Medicare website which has current facility and service quality ratings:    Education Provided on the Discharge Plan: Yes  Patient/Family in Agreement with the Plan: unable to assess (discussed mutiple discharge choices)    Referrals Placed by CM/SW: External Care Coordination, Specialty Providers  Private pay costs discussed: Not applicable    Additional Information:  Writer received a VM from Danita at [facility].    Writer called Danita back [phone] who requested for new therapy notes to see any potential progress. Writer resent referral and will await updated from Danita.         GAMALIEL Pang  [facility]  Social Work

## 2023-11-27 NOTE — PROGRESS NOTES
AO x3-4, disoriented to time. VSS ex hypertensive. PIV SL. Denies pain. BS checks. Minced moist diet and thickened liquids. Turned and repo Q2hrs. Spicer in place. Awaiting discharge to TCU.

## 2023-11-27 NOTE — PROGRESS NOTES
"CLINICAL NUTRITION SERVICES - REASSESSMENT NOTE      Recommendations Ordered by Registered Dietitian (RD):   RD encouraged pt to have at least 2 good meals/day + daily supplement for strength, healing.   Discontinued Magic Cup per pt request  Discontinued Gel+ per pt request  Ordered daily snack of orange jello @ 8 pm      Malnutrition:   % Weight Loss:  difficult to assess d/t fluid-status  % Intake:  <75% for > 7 days (moderate malnutrition)  Subcutaneous Fat Loss:  None observed (11/20)  Muscle Loss:  None observed (11/20)  Fluid Retention:  Does not meet criteria-- trace generalized edema     Malnutrition Diagnosis: Patient does not meet two of the established criteria necessary for diagnosing malnutrition but is at risk for malnutrition       EVALUATION OF PROGRESS TOWARD GOALS   Diet: Easy to Chew (level 7); Thin Liquids (level 0); Thin Liquids (level 0)   Room Service w/ assist  Snacks/Supplements Adult: Magic cup w/ breakfast; Nepro w/ lunch; Gelatein w/ dinner; With Meals    11/26: SLP = Cautiously recommend diet upgrade to soft and bite sized (IDDSI 6) and thin consistency (0)   11/27: SLP = Cautiously recommend diet upgrade to easy to chew solids (IDDSI 7b) and thin consistency (0)     Intake/Tolerance:  Pt reported she is eating \"ok.\" Appetite is \"good most of the time.\" Does not like the Magic cup supplement, requested it be discontinued. Nepro is \"ok\" but willing to continue. Does not like the gel+ flavors she has received, only wants regular orange jello. RD encouraged pt to have at least 2 good meals/day + daily supplement for strength, healing. Pt agreeable.   Pt has been receiving 3 meals/day + 3 supplements/day per health [facility]. Yesterday she received 2175 kcal and 95 g protein.   % intakes documented recently, mostly 50-75%, per nursing flow sheet.      ASSESSED NUTRITION NEEDS: (11/27)  Dosing Weight:  66 kg (adjusted, based on 93.5 kg-- 11/26)  Estimated Energy Needs: 1989-9218 kcals " (25-30 Kcal/Kg)  Justification: maintenance  Estimated Protein Needs: 79-99 grams protein (1.2-1.5 g pro/Kg)  Justification: hypercatabolism with acute illness  Estimated Fluid Needs: 1 mL/kcal  Justification: maintenance      NEW FINDINGS:   General: discharge to TCU pending placement  11/24: dialysis catheter removed     Weight: most recent wts remain closer to admission wt  Date/Time Weight Weight Method   11/26/23 0607 93.5 kg (206 lb 2.1 oz) --   11/25/23 0857 93.7 kg (206 lb 9.1 oz) --   11/25/23 0856 93.7 kg (206 lb 9.1 oz) --   11/23/23 0634 93.7 kg (206 lb 9.1 oz) Bed scale   11/20/23 0251 99 kg (218 lb 4.1 oz) Bed scale   10/26/23 0400 102.2 kg (225 lb 3.2 oz) Bed scale   10/25/23 0626 94.8 kg (209 lb)      I/O: Net IO Since Admission: -10,645.91 mL [11/27/23 0842].  2x BM yesterday, 1x on 11/25, 1x on 11/24, 1x on 11/23    Labs: reviewed   Component Value Date   BUN 27.4 (H) 11/27/2023   CR 2.66 (H) 11/27/2023     Lab 11/27/23  0712 11/27/23  0557 11/27/23  0202 11/26/23  2131 11/26/23  1714 11/26/23  1302   * 104* 100* 125* 85 185*     Medications: reviewed   B and C vitamin Complex with folic acid  5 mL Oral or Feeding Tube Daily    levothyroxine  100 mcg Oral or NG Tube QAM AC    magnesium oxide  400 mg Oral Daily    pantoprazole  40 mg Intravenous QAM AC    polyethylene glycol  17 g Oral or NG Tube Daily    senna-docusate  1 tablet Oral or NG Tube BID         Previous Goals:   Intake of at least 50% of adequate trays TID including supplements   Evaluation: Met    Previous Nutrition Diagnosis:   Inadequate oral intake related to fluctuating appetite, early satiety as evidenced by patient is able to eat well for 1-2 meals/day, eats less for the other 1-2 meals.    Evaluation: Improving        MALNUTRITION  % Weight Loss:  difficult to assess d/t fluid-status  % Intake:  <75% for > 7 days (moderate malnutrition)  Subcutaneous Fat Loss:  None observed (11/20)  Muscle Loss:  None observed  (11/20)  Fluid Retention:  Does not meet criteria-- trace generalized edema     Malnutrition Diagnosis: Patient does not meet two of the established criteria necessary for diagnosing malnutrition but is at risk for malnutrition        CURRENT NUTRITION DIAGNOSIS  Inadequate oral intake related to variable appetite as evidenced by variable intakes of % over past week        INTERVENTIONS  Recommendations / Nutrition Prescription  RD encouraged pt to have at least 2 good meals/day + daily supplement for strength, healing.   Discontinued Magic Cup per pt request  Discontinued Gel+ per pt request  Ordered daily snack of orange jello @ 8 pm       Implementation  Medical food supplement therapy  Modify composition of meals/snacks  Nutrition education for recommended modifications    Goals  Pt to consume >75% of 2 meals/day + daily Nepro supplement        MONITORING AND EVALUATION:  Progress towards goals will be monitored and evaluated per protocol and Practice Guidelines      Nora Ferris RD, LD  Pager: [phone]

## 2023-11-27 NOTE — PROGRESS NOTES
[facility]    Medicine Progress Note - Hospitalist Service    Date of Admission:  10/25/2023    Assessment & Plan   Mya Hui is a 83 year old female with PMH Scoliosis, Lumbar radiculopathy, HTN, HLP, DM2, Hypothyroidism, CKD stage 3, Morbid obesity, GERD, Sliding hiatal hernia, Overactive bladder, OA, Chronic right shoulder pain/rotator cuff arthropathy, Migraines, Asymptomatic carotid artery stenosis who was admitted on 10/25/2023 with an elective T11 to pelvis robotic decompression and fusion.     Surgery was performed on 10/25/23 under general anesthesia.  Surgery was complicated with development of arrhythmia with significant hypotension and noted 3.5 L blood loss per Op Note and Anesthesia postprocedure evaluation note.  Patient received IV fluids, 6 units of pRBCs and 2 units of FFP intra-op.  ST changes were noted intraoperatively.  Patient remained intubated and started on Levophed and was transferred to the ICU due to hemorrhagic shock.  She required vasopressor support with Levophed until 10/27. Transferred out of the ICU with hospitalist comanging since 11/03.     Scoliosis  Lumbar radiculopathy  S/p T11 to pelvis robotic decompression and fusion on 10/25  Acute blood loss anemia with post-op hemorrhagic shock, resolved  Shocked liver-resolved  Type 2 MI due to hemorrhagic shock, improved  Surgery was complicated with development of arrhythmia with significant hypotension and noted 3.5 L blood loss per Op Note and Anesthesia postprocedure evaluation note. Patient received IV fluids, 6 units of pRBCs and 2 units of FFP intra-op. Patient received additional 2 units FFP, 10 units of cryo and 1 unit of PLT while in the ICU. LFTs continue to improve 11/5-11/7.  Neurosurgery managing.  PT/OT rec ARU.   SW attempting to find placement at TCU or ARU.   As of 11/26/23, last oxycodone use was 11/17.     Encephalopathy, multifactorial, RESOLVED  Incidental finding of left basal  ganglia restricted diffusion  Patient was noted to be encephalopathic for which Vascular Neurology was consulted.   * Brain MRI was completed and noted a left basal ganglia small focus of restricted diffusion but not a cause for encephalopathy and felt it was likely multifactorial.    * EEG monitor was completed and negative and subsequently stopped.    * Suspect due to uremic encephalopathy and sepsis.  * Encephalopathy improved 11/6.  Delirium prevention:  Reorient patient at each interaction.  Keep room lights on and blinds open during day (8am-8pm), low lights 8pm-8am.  Minimize interruptions of sleep at night (consolidate vitals, nursing assessments, medications).  Avoid sedating medications as able.    Vascular Neurology recommended repeat brain MRI in 2-3 months.     Severe LISA secondary to ATN S/p hemodialysis  Anion gap metabolic acidosis due to above  CKD stage 3a  Hypoalbuminemia  Anasarca  Baseline creatinine between 1.1-1.4 with GFR in the 40-50's.   Due to development of LISA-D Nephrology was consulted and tunnel dialysis catheter placed 10/30 and has been undergoing HD Mon, Wed, Friday  Last hemodialysis was 11/17.  Continued drop in creatinine with good urine output.  No more dialysis needed.  Hemodialysis catheter removed on 11/24.  Spicer placed for urinary retention.  New since surgery. Likely related to decreased mobility, no longer requiring narcotics for pain.   Continue Spicer until more mobile.      Overactive bladder  Urinary retention  - Holding PTA Ditropan XL 10 mg/d.  - Spicer catheter placed 11/21/23 due to urinary retention.     Acute blood loss anemia   Hb stable ~8 since her initial hemorrhage. 11/17- Hb lower at 7.6 No bleeding noted. Nephrology planning to give EPO   Hb now 8-9 range.  Monitor with intermittent labs.    Sepsis due to aspiration/ventilator associated pneumonia (Enterobacter, stenotrophomonas and E. Coli)  Acute hypoxic respiratory failure RESOLVED  Leukocytosis due to  above  Extubated 11/2. Patient has also developed aspiration/ventilator associated pneumonia with sputum Cx growing Enterobacter, stenotrophomonas and E. coli and was initially started on Zoysn and switched to Meropenem on 11/1/2023.  Currently continued on Meropenem and awaiting final cultures. Procal 3.   * completed 14 day course of antibiotics 11/14.  Encourage use of IS/Flutter valve.       Dysphagia-improving  Moderate malnutrition  Failed SLP assessment. Bedside feeding tube placement coiling due to hiatal hernia.  Started PPN 11/10/23, discontinued on 11/18/23.  Dysphagia diet per speech  Nutrition consult appreciated on supplements.     Constipation, with incontinence to stool following laxative use  Due to decreased mobility unable to make it to bathroom.  Patient is understandably distressed by this.  Continue senna and MiraLAX to ensure daily bowel movements but hold for frequent loose stools.  Had a loose stool on 11/27. Will continue bowel regimen with hold parameters.     Hypertension  NSVT  Continue diltiazem  mg po daily and hydralazine 10 mg po q8h.  Holding PTA lisinopril for ATN.     Hyperlipidemia  Hold PTA pravastatin 40 mg/d to reduce pill burden.     Type 2 DM, controlled, A1c 6.3 on 10/25  Stress induced hyperglycemia  Blood sugars have been very well-controlled, without insulin needs. Had one elevated sugar on 11/26, resolved without intervention.  Given recent rise will continue to follow blood sugars 3 times daily before meals      Hypothyroidism  Continue PTA synthroid.     GERD  Sliding hiatal hernia  Continue PTA PPI.     OA  Chronic right shoulder pain/rotator cuff arthropathy  Chronic Pain  Pain/analgesic management per Neurosurgery.    As of 11/27, last oxycodone use was 11/17.     Obesity   Suspected ANAYELI  Body mass index is 37.64 kg/m .  Increase in all-cause morbidity and mortality.   - Follow up with PCP regarding ongoing management.    - Monitor O2 saturations.    -  Recommend outpatient sleep study.      Migraines  - Pain/analgesic management per Neurosurgery.       Asymptomatic carotid artery stenosis  - Noted.     Hypomagnesemia   - Replace and recheck per RN managed protocol.          Diet: Room Service  Combination Diet Easy to Chew (level 7); Thin Liquids (level 0); Thin Liquids (level 0) (one small sip at a time when alert and sitting fully upright. No straws. Alternate between bites/sips.)  Snacks/Supplements Adult: Other; Nepro w/ lunch; oragen jello @ 8 pm; With Meals    DVT Prophylaxis: Heparin SQ  Spicer Catheter: PRESENT, indication: Retention, Strict 1-2 Hour I&O  Lines: None     Cardiac Monitoring: None  Code Status: Full Code      Clinically Significant Risk Factors              # Hypoalbuminemia: Lowest albumin = 2.5 g/dL at 11/1/2023  4:19 AM, will monitor as appropriate     # Hypertension: Noted on problem list         # Moderate Malnutrition: based on nutrition assessment    # Financial/Environmental Concerns: none         Disposition Plan     Expected Discharge Date: 11/27/2023      Destination: home with help/services;inpatient rehabilitation facility  Discharge Comments: TCU vs. ARU- Pt wants [facility]- SW following            Zak Gauthier MD  Hospitalist Service  [facility]  Securely message with [employer] (more info)  Text page via [employer] Paging/Directory   ______________________________________________________________________    Interval History   Mya Hui was seen today. No new complaints/concerns. Denies fevers, chest pain, shortness of breath, nausea, abdominal pain. Awaiting placement.    Physical Exam   Vital Signs: Temp: 98.2  F (36.8  C) Temp src: Oral BP: 118/86 Pulse: 86   Resp: 18 SpO2: 90 % O2 Device: None (Room air)    Weight: 206 lbs 2.08 oz    Constitutional: awake, alert, cooperative, no apparent distress, laying in the hospital bed  Respiratory: no increased work of breathing, clear to  auscultation bilaterally, no crackles or wheezing  Cardiovascular: regular rate and rhythm, normal S1 and S2, no murmur noted  GI: normal bowel sounds, soft, non-distended, non-tender  Skin: warm, dry  Musculoskeletal: no lower extremity pitting edema present  Neurologic: awake, alert, appropriate in conversation, moves all extremities    Medical Decision Making       40 MINUTES SPENT BY ME on the date of service doing chart review, history, exam, documentation & further activities per the note.      Data     I have personally reviewed the following data over the past 24 hrs:    N/A  \   N/A   / N/A     139 103 27.4 (H) /  115 (H)   3.5 25 2.66 (H) \     ALT: N/A AST: N/A AP: N/A TBILI: N/A   ALB: 3.3 (L) TOT PROTEIN: N/A LIPASE: N/A

## 2023-11-28 ENCOUNTER — APPOINTMENT (OUTPATIENT)
Dept: PHYSICAL THERAPY | Facility: CLINIC | Age: 83
DRG: 453 | End: 2023-11-28
Attending: NEUROLOGICAL SURGERY
Payer: COMMERCIAL

## 2023-11-28 ENCOUNTER — APPOINTMENT (OUTPATIENT)
Dept: OCCUPATIONAL THERAPY | Facility: CLINIC | Age: 83
DRG: 453 | End: 2023-11-28
Attending: NEUROLOGICAL SURGERY
Payer: COMMERCIAL

## 2023-11-28 LAB
ALBUMIN SERPL BCG-MCNC: 3.4 G/DL (ref 3.5–5.2)
ANION GAP SERPL CALCULATED.3IONS-SCNC: 11 MMOL/L (ref 7–15)
BUN SERPL-MCNC: 25.1 MG/DL (ref 8–23)
CALCIUM SERPL-MCNC: 9.1 MG/DL (ref 8.8–10.2)
CHLORIDE SERPL-SCNC: 103 MMOL/L (ref 98–107)
CREAT SERPL-MCNC: 2.5 MG/DL (ref 0.51–0.95)
DEPRECATED HCO3 PLAS-SCNC: 25 MMOL/L (ref 22–29)
EGFRCR SERPLBLD CKD-EPI 2021: 19 ML/MIN/1.73M2
GLUCOSE BLDC GLUCOMTR-MCNC: 109 MG/DL (ref 70–99)
GLUCOSE BLDC GLUCOMTR-MCNC: 118 MG/DL (ref 70–99)
GLUCOSE BLDC GLUCOMTR-MCNC: 164 MG/DL (ref 70–99)
GLUCOSE SERPL-MCNC: 149 MG/DL (ref 70–99)
HGB BLD-MCNC: 9.3 G/DL (ref 11.7–15.7)
MAGNESIUM SERPL-MCNC: 2 MG/DL (ref 1.7–2.3)
PHOSPHATE SERPL-MCNC: 3.5 MG/DL (ref 2.5–4.5)
PLATELET # BLD AUTO: 263 10E3/UL (ref 150–450)
POTASSIUM SERPL-SCNC: 3.3 MMOL/L (ref 3.4–5.3)
POTASSIUM SERPL-SCNC: 3.7 MMOL/L (ref 3.4–5.3)
SODIUM SERPL-SCNC: 139 MMOL/L (ref 135–145)

## 2023-11-28 PROCEDURE — 85049 AUTOMATED PLATELET COUNT: CPT | Performed by: NEUROLOGICAL SURGERY

## 2023-11-28 PROCEDURE — 250N000013 HC RX MED GY IP 250 OP 250 PS 637: Performed by: NURSE PRACTITIONER

## 2023-11-28 PROCEDURE — 120N000001 HC R&B MED SURG/OB

## 2023-11-28 PROCEDURE — 250N000011 HC RX IP 250 OP 636: Mod: JZ | Performed by: INTERNAL MEDICINE

## 2023-11-28 PROCEDURE — 97530 THERAPEUTIC ACTIVITIES: CPT | Mod: GP | Performed by: PHYSICAL THERAPIST

## 2023-11-28 PROCEDURE — C9113 INJ PANTOPRAZOLE SODIUM, VIA: HCPCS | Mod: JZ | Performed by: NEUROLOGICAL SURGERY

## 2023-11-28 PROCEDURE — 250N000013 HC RX MED GY IP 250 OP 250 PS 637: Performed by: STUDENT IN AN ORGANIZED HEALTH CARE EDUCATION/TRAINING PROGRAM

## 2023-11-28 PROCEDURE — 250N000013 HC RX MED GY IP 250 OP 250 PS 637: Performed by: INTERNAL MEDICINE

## 2023-11-28 PROCEDURE — 36415 COLL VENOUS BLD VENIPUNCTURE: CPT | Performed by: INTERNAL MEDICINE

## 2023-11-28 PROCEDURE — 250N000013 HC RX MED GY IP 250 OP 250 PS 637: Performed by: HOSPITALIST

## 2023-11-28 PROCEDURE — 85018 HEMOGLOBIN: CPT | Performed by: HOSPITALIST

## 2023-11-28 PROCEDURE — 250N000011 HC RX IP 250 OP 636: Mod: JZ | Performed by: NEUROLOGICAL SURGERY

## 2023-11-28 PROCEDURE — 82040 ASSAY OF SERUM ALBUMIN: CPT | Performed by: INTERNAL MEDICINE

## 2023-11-28 PROCEDURE — 97116 GAIT TRAINING THERAPY: CPT | Mod: GP | Performed by: PHYSICAL THERAPIST

## 2023-11-28 PROCEDURE — 83735 ASSAY OF MAGNESIUM: CPT | Performed by: INTERNAL MEDICINE

## 2023-11-28 PROCEDURE — 97535 SELF CARE MNGMENT TRAINING: CPT | Mod: GO

## 2023-11-28 PROCEDURE — 99233 SBSQ HOSP IP/OBS HIGH 50: CPT | Performed by: INTERNAL MEDICINE

## 2023-11-28 PROCEDURE — 84132 ASSAY OF SERUM POTASSIUM: CPT | Performed by: INTERNAL MEDICINE

## 2023-11-28 PROCEDURE — 99232 SBSQ HOSP IP/OBS MODERATE 35: CPT | Performed by: INTERNAL MEDICINE

## 2023-11-28 RX ORDER — POTASSIUM CHLORIDE 1500 MG/1
20 TABLET, EXTENDED RELEASE ORAL ONCE
Status: COMPLETED | OUTPATIENT
Start: 2023-11-28 | End: 2023-11-28

## 2023-11-28 RX ADMIN — HYDROXYZINE HYDROCHLORIDE 10 MG: 10 TABLET ORAL at 12:44

## 2023-11-28 RX ADMIN — NYSTATIN: 100000 CREAM TOPICAL at 12:55

## 2023-11-28 RX ADMIN — Medication 5 ML: at 19:36

## 2023-11-28 RX ADMIN — NYSTATIN: 100000 CREAM TOPICAL at 19:36

## 2023-11-28 RX ADMIN — HYDRALAZINE HYDROCHLORIDE 10 MG: 10 TABLET ORAL at 14:13

## 2023-11-28 RX ADMIN — TRAZODONE HYDROCHLORIDE 25 MG: 50 TABLET ORAL at 22:03

## 2023-11-28 RX ADMIN — OXYCODONE HYDROCHLORIDE 5 MG: 5 TABLET ORAL at 10:07

## 2023-11-28 RX ADMIN — Medication 2 SPRAY: at 22:03

## 2023-11-28 RX ADMIN — HEPARIN SODIUM 5000 UNITS: 5000 INJECTION, SOLUTION INTRAVENOUS; SUBCUTANEOUS at 16:54

## 2023-11-28 RX ADMIN — HYDRALAZINE HYDROCHLORIDE 10 MG: 10 TABLET ORAL at 05:04

## 2023-11-28 RX ADMIN — Medication 2 SPRAY: at 19:36

## 2023-11-28 RX ADMIN — Medication 2 SPRAY: at 08:26

## 2023-11-28 RX ADMIN — Medication 400 MG: at 08:20

## 2023-11-28 RX ADMIN — HYDRALAZINE HYDROCHLORIDE 10 MG: 10 TABLET ORAL at 22:03

## 2023-11-28 RX ADMIN — LEVOTHYROXINE SODIUM 100 MCG: 100 TABLET ORAL at 05:04

## 2023-11-28 RX ADMIN — HEPARIN SODIUM 5000 UNITS: 5000 INJECTION, SOLUTION INTRAVENOUS; SUBCUTANEOUS at 08:20

## 2023-11-28 RX ADMIN — ACETAMINOPHEN 650 MG: 325 TABLET, FILM COATED ORAL at 12:44

## 2023-11-28 RX ADMIN — POTASSIUM CHLORIDE 20 MEQ: 1500 TABLET, EXTENDED RELEASE ORAL at 14:13

## 2023-11-28 RX ADMIN — Medication 2 SPRAY: at 12:55

## 2023-11-28 RX ADMIN — DILTIAZEM HYDROCHLORIDE 180 MG: 180 CAPSULE, COATED, EXTENDED RELEASE ORAL at 08:20

## 2023-11-28 RX ADMIN — PANTOPRAZOLE SODIUM 40 MG: 40 INJECTION, POWDER, FOR SOLUTION INTRAVENOUS at 05:04

## 2023-11-28 ASSESSMENT — ACTIVITIES OF DAILY LIVING (ADL)
ADLS_ACUITY_SCORE: 39

## 2023-11-28 NOTE — PROGRESS NOTES
[id number] 11/28  Pt is a 83 year old female with PMH Scoliosis, Lumbar radiculopathy, HTN, HLP, DM2, Hypothyroidism, CKD stage 3, Morbid obesity, GERD, Sliding hiatal hernia, Overactive bladder, OA, Chronic right shoulder pain/rotator cuff arthropathy, Migraines, Asymptomatic carotid artery stenosis who was admitted on 10/25/2023 with an elective T11 to pelvis robotic decompression and fusion.     Alert and oriented x 4, PIV SL, assist of 2 with vaughn steady, q 2 turn , Tele NSR ,  chronic sykes in place. Denied having any pain, numbness or tingling.  Discharge pending TCU placement.

## 2023-11-28 NOTE — PROGRESS NOTES
Renal Medicine Progress Note                                Mya Hui MRN# [id number]   Age: 83 year old YOB: 1940   Date of Admission: 10/25/2023 Hospital LOS: 34                  Assessment/Plan:     1.  Severe Acute Kidney Injury  -dialysis dependent  -last dialysis 11/17/23   -TDC is out   2.  CKD IIIa.    -pre admission creatinine 1.1 to 1.2 mg/dl range   3.  FEN.  Electrolytes are ok.          No further dialysis  Fe studies OK  Follow labs     No further recommendations  Signing off    Renal follow-up 4 weeks  Primary care/labs next week       Interval History:     In bed  Comfortable  Good UO    Follows    No complaints     ROS:     GENERAL: NAD, No fever,chills  R: NEGATIVE for significant cough or SOB  CV: NEGATIVE for chest pain, palpitations  EXT: no change edema  ROS otherwise negative    Medications and Allergies:     Reviewed    Physical Exam:     Vitals were reviewed  Patient Vitals for the past 8 hrs:   BP Temp Temp src Pulse Resp SpO2   11/28/23 0734 129/73 97.6  F (36.4  C) Oral 82 16 94 %     Wt Readings from Last 4 Encounters:   11/26/23 93.5 kg (206 lb 2.1 oz)   10/19/23 95 kg (209 lb 8 oz)   09/28/23 97.1 kg (214 lb)   09/14/23 97.1 kg (214 lb)     I/O last 3 completed shifts:  In: -   Out: 675 [Urine:675]    Vitals:    11/20/23 0251 11/23/23 0634 11/25/23 0856 11/25/23 0857   Weight: 99 kg (218 lb 4.1 oz) 93.7 kg (206 lb 9.1 oz) 93.7 kg (206 lb 9.1 oz) 93.7 kg (206 lb 9.1 oz)    11/26/23 0607   Weight: 93.5 kg (206 lb 2.1 oz)         GENERAL: awake, alert, follows  HEENT: NC/AT, PERRLA, EOMI, non icteric, pharynx moist without lesion  RESP:  clear anteriorly  CV: RRR, normal S1 S2  ABDOMEN: soft, nontender, no HSM or masses and bowel sounds normal  MS: no clubbing, cyanosis   SKIN: clear without significant rashes or lesions  EXT: warm, no edema    Data:     Recent Labs   Lab 11/28/23  0931 11/28/23  0819 11/27/23  2218 11/27/23  1230 11/27/23  0849 11/27/23  0712  11/26/23  1714 11/26/23  1302 11/25/23  0941 11/25/23  0630     --   --   --   --  139  --  138  --  134*   POTASSIUM 3.3*  --   --   --   --  3.5  --  3.6  --  3.9   CHLORIDE 103  --   --   --   --  103  --  100  --  98   CO2 25  --   --   --   --  25  --  25  --  23   ANIONGAP 11  --   --   --   --  11  --  13  --  13   * 109* 138* 163*   < > 100*   < > 185*   < > 105*   BUN 25.1*  --   --   --   --  27.4*  --  30.8*  --  33.5*   CR 2.50*  --   --   --   --  2.66*  --  2.93*  --  3.26*   GFRESTIMATED 19*  --   --   --   --  17*  --  16*  --  14*   TERESA 9.1  --   --   --   --  9.0  --  9.2  --  9.0    < > = values in this interval not displayed.         Recent Labs   Lab 11/28/23 0931 11/27/23  0712 11/26/23  1302 11/25/23  0630 11/24/23  0746 11/23/23  0718 11/22/23  0740   CR 2.50* 2.66* 2.93* 3.26* 3.57* 4.02* 4.40*     Recent Labs   Lab 11/28/23  0931 11/27/23  0712 11/26/23  1302 11/25/23  0630 11/24/23  0746   POTASSIUM 3.3* 3.5 3.6 3.9 3.6     Recent Labs   Lab 11/28/23  0931 11/27/23  0712 11/26/23  1302 11/25/23  0630   ALBUMIN 3.4* 3.3* 3.4* 3.2*     Recent Labs   Lab 11/28/23  0931 11/27/23  0712 11/26/23  1302 11/25/23  0630 11/24/23  0746 11/23/23  0718   PHOS 3.5 3.7 3.0 3.3 3.9 3.7   HGB 9.3*  --   --  8.9* 8.7* 7.9*     Recent Labs   Lab Test 11/27/23  0712 09/18/17  1458   IRON 80 46    381   IRONSAT 31 12*     --          G Floyd Guillaume MD    [facility] - Nephrology  [phone]

## 2023-11-28 NOTE — PROGRESS NOTES
Notified provider about indwelling sykes catheter discussed removal or continued need.    Did provider choose to remove indwelling sykes catheter? No    Provider's sykes indication for keeping indwelling sykes catheter: Retention.    Is there an order for indwelling sykes catheter? Yes    *If there is a plan to keep sykes catheter in place at discharge daily notification with provider is not necessary, but please add a notation in the treatment team sticky note that the patient will be discharging with the catheter.

## 2023-11-28 NOTE — PROGRESS NOTES
[facility]    Hospitalist Progress Note    Date of Service (when I saw the patient): 11/28/2023    Assessment & Plan     Mya Hui is a pleasant 83 year old woman with history of scoliosis, lumbar radiculopathy, HTN, dyslipidemia, DM type 2, hypothyroidism, CKD stage 3, morbid obesity, GERD, sliding hiatal hernia, overactive bladder, OA, chronic right shoulder pain/rotator cuff arthropathy, migraines, and asymptomatic carotid artery stenosis - who was admitted 10/25/2023 for an elective T11 to pelvis robotic decompression and fusion.    Surgery was complicated by development of arrhythmia with significant hypotension and noted 3.5 L blood loss per Op note and Anesthesia post-procedure evaluation note.  She received IV fluids, 6 units of pRBCs and 2 units of FFP intra-op.  ST changes were noted intraoperatively.  Patient remained intubated and was started on Levophed and then transferred to the ICU due to hemorrhagic shock.  She required vasopressor support with Levophed until 10/27.   Transferred out of the ICU with Hospitalist co-managing since 11/03.  Current problems include:     Scoliosis  Lumbar radiculopathy  S/p T11 to pelvis robotic decompression and fusion on 10/25  - 34 days post-op  Acute blood loss anemia with post-op hemorrhagic shock, resolved  Shocked liver - resolved  Type 2 MI due to hemorrhagic shock, improved  Physical deconditioning, multifactorial  - Neurosurgery managing.  - continue PT/OT; recommending ARU.   - SW involved in finding placement at TCU or ARU.   - as of 11/26/23, last oxycodone use was 11/17.  - wear brace when out of bed    - routine wound care   -> follow up with Neurosurgery clinic at 6 weeks post-op with Xray prior    Encephalopathy, multifactorial; resolved  Left basal ganglia restricted diffusion (incidental finding)  Per prior: was noted to be encephalopathic for which Vascular Neurology was consulted.   * Brain MRI was completed and noted  a left basal ganglia small focus of restricted diffusion but not a cause for encephalopathy and felt it was likely multifactorial.    * EEG monitor was completed and negative and subsequently stopped.    * suspect due to uremic encephalopathy and sepsis.  * Encephalopathy improved 11/6.  Continue delirium prevention:  - reorient patient at each interaction.  - keep room lights on and blinds open during day (8am-8pm), low lights 8pm-8am.  - minimize interruptions of sleep at night (consolidate vitals, nursing assessments, medications).  - avoid sedating medications as able.    - Vascular Neurology recommended repeat brain MRI in 2-3 months.     Severe LISA secondary to ATN S/p hemodialysis  Anion gap metabolic acidosis due to above  CKD stage 3a  Hypoalbuminemia  Anasarca  Baseline creatinine between 1.1-1.4 with GFR in the 40-50's.   Due to development of LISA-D, Nephrology was consulted and tunnel dialysis catheter placed 10/30 and has been undergoing HD Mon, Wed, Friday  - last hemodialysis was 11/17.  - had continued drop in creatinine with good urine output.  No more dialysis needed.  Hemodialysis catheter removed 11/24.  - Spicer placed for urinary retention.  - continue Spicer until more mobile.      Overactive bladder  Urinary retention; new since surgery  - holding PTA Ditropan XL 10 mg/d.  - Spicer catheter placed 11/21/23 due to urinary retention.     Acute blood loss anemia   Hb stable ~8 since her initial hemorrhage. 11/17- Hb lower at 7.6   - Nephrology planning to give EPO   - Hgb now 8-9 range.  - monitor with intermittent labs.     Sepsis due to aspiration/ventilator associated pneumonia (Enterobacter, stenotrophomonas and E. Coli)  Acute hypoxic respiratory failure; resolved  Leukocytosis due to above  Per prior: was extubated 11/2.  Also developed aspiration/ventilator associated pneumonia with sputum Cx growing Enterobacter, stenotrophomonas and E. coli and was initially started on Zoysn and switched  to Meropenem on 11/1/2023.  - earlier was on Meropenem (? stopped 11/5)   - completed 14 day course of antibiotics 11/14.  - encourage use of IS/Flutter valve.       Dysphagia - improving  Moderate malnutrition  Failed SLP assessment.   Bedside feeding tube placement coiling due to hiatal hernia.  started PPN 11/10/23, discontinued on 11/18/23.  dysphagia diet per speech  RD consult appreciated     Constipation, with incontinence to stool following laxative use  due to decreased mobility unable to make it to bathroom.  Patient is understandably distressed by this.  Continue senna and MiraLAX to ensure daily bowel movements but hold for frequent loose stools.  had a loose stool on 11/27; continue bowel regimen with hold parameters.     Hypertension  NSVT  continue diltiazem  mg po daily and hydralazine 10 mg po q8h.  holding PTA lisinopril for ATN.     Dyslipidemia  holding PTA pravastatin 40 mg/d to reduce pill burden.     Type 2 DM, controlled; 10/25 A1c = 6.3%  Stress induced hyperglycemia  Blood sugars have been very well-controlled, without insulin   given recent rise will continue to follow blood sugars 3 times daily before meals      Hypothyroidism  continue PTA synthroid.     GERD  Sliding hiatal hernia  continue PTA PPI.     OA  Chronic right shoulder pain/rotator cuff arthropathy  Chronic Pain  pain/analgesic management per Neurosurgery.    as of 11/27, last oxycodone use was 11/17.     Obesity   Suspected ANAYELI  Body mass index is 37.64 kg/m .  - follow up with PCP regarding ongoing management.    - monitor O2 saturations.    - needs outpatient sleep study.       Migraines; no recent recurrence  - continue PRN analgesics      Asymptomatic carotid artery stenosis  - follow up with PCP     Hypomagnesemia   Hypokalemia  - continue monitoring and RN managed protocol.         Diet: Room Service  Combination Diet Easy to Chew (level 7); Thin Liquids (level 0); Thin Liquids (level 0) (one small sip at a time  when alert and sitting fully upright. No straws. Alternate between bites/sips.)  Snacks/Supplements Adult: Other; Nepro w/ lunch; oragen jello @ 8 pm; With Meals    DVT Prophylaxis: Heparin SQ  Spicer Catheter: PRESENT, indication: Retention, Strict 1-2 Hour I&O  Lines: None     Cardiac Monitoring: None  Code Status: Full Code          Disposition Plan     Expected Discharge Date: TBD    Destination: home with help/services; inpatient rehabilitation facility  Discharge comments: TCU vs. ARU; Mya and her daughter prefer [facility] at [facility] in [place].    - anticipate follow up by LSW 11/29        OKSANA Constantino MD, [facility] Hospitalist  Text Page (7am - 6pm)    Interval History   No new issues.    Data reviewed today: I reviewed all new labs and imaging results over the last 24 hours.     Physical Exam   Temp: 97.6  F (36.4  C) Temp src: Oral BP: 129/73 Pulse: 82   Resp: 16 SpO2: 94 % O2 Device: None (Room air)    Vitals:    11/25/23 0856 11/25/23 0857 11/26/23 0607   Weight: 93.7 kg (206 lb 9.1 oz) 93.7 kg (206 lb 9.1 oz) 93.5 kg (206 lb 2.1 oz)     Vital Signs with Ranges  Temp:  [97.6  F (36.4  C)-99.8  F (37.7  C)] 97.6  F (36.4  C)  Pulse:  [76-83] 82  Resp:  [16] 16  BP: (115-138)/(65-73) 129/73  SpO2:  [94 %-96 %] 94 %  I/O last 3 completed shifts:  In: -   Out: 675 [Urine:675]    Constitutional: awake, no apparent distress; resting in bed  HEENT: sclerae clear; MM's moist  Respiratory: good a/e bilaterally, no wheezing or rhonchi  Cardiovascular: regular rate and rhythm, S1, S2 noted; no m/r/g  GI: abdomen flat, + bowel sounds; soft, non-tender, non-distended  Skin/Integumen: no rashes, no cyanosis, no jaundice  Musculoskeletal: no edema  Neurologic: follows directions well; no focal deficits      Medications    dextrose        artificial saliva  2 spray Swish & Spit 4x Daily    B and C vitamin Complex with folic acid  5 mL Oral or Feeding Tube Daily    diltiazem ER COATED  BEADS  180 mg Oral Daily    heparin ANTICOAGULANT  5,000 Units Subcutaneous Q8H    hydrALAZINE  10 mg Oral or Feeding Tube Q8H    levothyroxine  100 mcg Oral or NG Tube QAM AC    magnesium oxide  400 mg Oral Daily    nystatin   Topical BID    pantoprazole  40 mg Oral or Feeding Tube QAM AC    Or    pantoprazole  40 mg Intravenous QAM AC    polyethylene glycol  17 g Oral or NG Tube Daily    senna-docusate  1 tablet Oral or NG Tube BID    sodium chloride (PF)  3 mL Intracatheter Q8H    sodium chloride (PF)  9 mL Intracatheter During Dialysis/CRRT (from stock)    traZODone  25 mg Oral At Bedtime       Data   Recent Labs   Lab 11/28/23  0931 11/28/23  0819 11/27/23  2218 11/27/23  0849 11/27/23  0712 11/26/23  1714 11/26/23  1302 11/25/23  0941 11/25/23  0630 11/24/23  0916 11/24/23  0746 11/23/23  0859 11/23/23  0718   WBC  --   --   --   --   --   --   --   --  7.0  --  6.1  --  5.7   HGB 9.3*  --   --   --   --   --   --   --  8.9*  --  8.7*  --  7.9*   MCV  --   --   --   --   --   --   --   --  92  --  94  --  92   PLT  --   --   --   --   --   --   --   --  258  --  264  --  239     --   --   --  139  --  138  --  134*  --  139  --  140   POTASSIUM 3.3*  --   --   --  3.5  --  3.6  --  3.9  --  3.6  --  3.6   CHLORIDE 103  --   --   --  103  --  100  --  98  --  102  --  103   CO2 25  --   --   --  25  --  25  --  23  --  24  --  24   BUN 25.1*  --   --   --  27.4*  --  30.8*  --  33.5*  --  37.3*  --  42.6*   CR 2.50*  --   --   --  2.66*  --  2.93*  --  3.26*  --  3.57*  --  4.02*   ANIONGAP 11  --   --   --  11  --  13  --  13  --  13  --  13   TERESA 9.1  --   --   --  9.0  --  9.2  --  9.0  --  9.0  --  8.7*   * 109* 138*   < > 100*   < > 185*   < > 105*   < > 121*   < > 110*   ALBUMIN 3.4*  --   --   --  3.3*  --  3.4*  --  3.2*  --  3.3*  --  3.0*    < > = values in this interval not displayed.

## 2023-11-28 NOTE — PROGRESS NOTES
Care Management Follow Up    Length of Stay (days): 34    Expected Discharge Date: 11/30/2023     Concerns to be Addressed: discharge planning     Patient plan of care discussed at interdisciplinary rounds: Yes    Anticipated Discharge Disposition: Home, Home Care, Dialysis Services, Skilled Nursing Facility, Transitional Care     Anticipated Discharge Services: Transportation Services  Anticipated Discharge DME: Other (see comment) (to be determined)    Patient/family educated on Medicare website which has current facility and service quality ratings:    Education Provided on the Discharge Plan: Yes  Patient/Family in Agreement with the Plan: unable to assess (discussed mutiple discharge choices)    Referrals Placed by CM/SW: External Care Coordination, Specialty Providers  Private pay costs discussed: Not applicable    Additional Information:  Writer got a VM from Danita over at [facility].     Writer called Danita back [phone]. Danita stated their only concern was that the patient was not participating in therapies and are concerned that the patient would not be able to partiicpate in therapies at [facility]. Writer stated the patient has been doing well and improving and still recommended for ARU from therapies here.     Danita stated if that was the case then the next thing was PT had to put in updated notes in order for the patient to receive auth to go to ARU.     Writer inquired with bedside nurse that patient needed to participate, but bedside rn stated that PT should have put in orders in since she participated yesterday and today. Bedside rn will discuss with PT.     Writer will await for new notes from PT to send to ARU.     Addendum 1519:  Writer met with Danita from [facility] who stated the attempted to speak with the patient but patient had denied speaking with them.  Writer updated her that Liberty Price, patient's daughter, is the HCA.  Danita will contact patient's daughter  for further inquiries.    Writer called the patient's daughter, [phone], and updated her on the discussion with Danita.  Patient's daughter stated they spoke with Triny, previous , who had sent a referral to [facility] in [place].  Patient's daughter stated that they work for [facility] in [place] and they heard from one of the admissions people that the reason they had declined the patient was due to no female bed availability.  Patient's daughter stated that however they could potentially have something for tomorrow and inquired if writer could send referral there tomorrow.  Additionally, patient stated they would like writer to prioritize ARU at [facility] in [place].     Writer stated that he will send a referral but which ever facility accepts the patient first they would have to move forward with that facility.  Patient's daughter in agreement.    Writer called [facility] in [place], [phone], and left a voicemail inquiring about reviewing of patient's referral for ARU.  Writer requested callback.    Writer will send a referral over to [facility] in [place] tomorrow.  Writer will follow up with Danita BOWIE.        GAMALIEL Pang  [facility]  Social Work

## 2023-11-28 NOTE — PROGRESS NOTES
[facility]    Neurosurgery Progress Note    Date of Service (when I saw the patient): 11/28/2023     Assessment & Plan     Procedure(s):  Thoracic 11 to pelvis robotic decompression and fusion   -34 Days Post-Op  Chart reviewed. Patient doing well per nursing note, no new issues.     Plan:  - Discharge pending ARU placement, SW following  - Continue PT/OT, wear brace when out of bed   - Pain control measures as needed   - Routine wound care   - Follow up with NSGY clinic at 6 weeks post op with xray prior    - Appreciate assistance from specialties

## 2023-11-28 NOTE — PROGRESS NOTES
Shift Summary [id number]    Admitting Diagnosis: Scoliosis of lumbar spine, unspecified scoliosis type. S/P lumbar spinal fusion.    Patient A&Ox4. VSS, on RA. On BS but no insulin see order for when to Pt MD for elevated BS. On Combined easy to chew diet. T/R. Mepilex on the coccyx for redness. Spicer in place for retention. Awaiting TCU placement. Will continue to monitor.

## 2023-11-29 ENCOUNTER — APPOINTMENT (OUTPATIENT)
Dept: OCCUPATIONAL THERAPY | Facility: CLINIC | Age: 83
DRG: 453 | End: 2023-11-29
Attending: NEUROLOGICAL SURGERY
Payer: COMMERCIAL

## 2023-11-29 ENCOUNTER — APPOINTMENT (OUTPATIENT)
Dept: SPEECH THERAPY | Facility: CLINIC | Age: 83
DRG: 453 | End: 2023-11-29
Attending: NEUROLOGICAL SURGERY
Payer: COMMERCIAL

## 2023-11-29 ENCOUNTER — APPOINTMENT (OUTPATIENT)
Dept: PHYSICAL THERAPY | Facility: CLINIC | Age: 83
DRG: 453 | End: 2023-11-29
Attending: NEUROLOGICAL SURGERY
Payer: COMMERCIAL

## 2023-11-29 LAB
ALBUMIN SERPL BCG-MCNC: 3.2 G/DL (ref 3.5–5.2)
ANION GAP SERPL CALCULATED.3IONS-SCNC: 8 MMOL/L (ref 7–15)
BUN SERPL-MCNC: 24.2 MG/DL (ref 8–23)
CALCIUM SERPL-MCNC: 9.1 MG/DL (ref 8.8–10.2)
CHLORIDE SERPL-SCNC: 106 MMOL/L (ref 98–107)
CREAT SERPL-MCNC: 2.36 MG/DL (ref 0.51–0.95)
DEPRECATED HCO3 PLAS-SCNC: 25 MMOL/L (ref 22–29)
EGFRCR SERPLBLD CKD-EPI 2021: 20 ML/MIN/1.73M2
GLUCOSE BLDC GLUCOMTR-MCNC: 118 MG/DL (ref 70–99)
GLUCOSE BLDC GLUCOMTR-MCNC: 119 MG/DL (ref 70–99)
GLUCOSE BLDC GLUCOMTR-MCNC: 148 MG/DL (ref 70–99)
GLUCOSE SERPL-MCNC: 109 MG/DL (ref 70–99)
PHOSPHATE SERPL-MCNC: 3.8 MG/DL (ref 2.5–4.5)
POTASSIUM SERPL-SCNC: 3.9 MMOL/L (ref 3.4–5.3)
SODIUM SERPL-SCNC: 139 MMOL/L (ref 135–145)

## 2023-11-29 PROCEDURE — 97116 GAIT TRAINING THERAPY: CPT | Mod: GP

## 2023-11-29 PROCEDURE — 36415 COLL VENOUS BLD VENIPUNCTURE: CPT | Performed by: INTERNAL MEDICINE

## 2023-11-29 PROCEDURE — C9113 INJ PANTOPRAZOLE SODIUM, VIA: HCPCS | Mod: JZ | Performed by: NEUROLOGICAL SURGERY

## 2023-11-29 PROCEDURE — 250N000013 HC RX MED GY IP 250 OP 250 PS 637: Performed by: HOSPITALIST

## 2023-11-29 PROCEDURE — 250N000013 HC RX MED GY IP 250 OP 250 PS 637: Performed by: INTERNAL MEDICINE

## 2023-11-29 PROCEDURE — 120N000001 HC R&B MED SURG/OB

## 2023-11-29 PROCEDURE — 97530 THERAPEUTIC ACTIVITIES: CPT | Mod: GP

## 2023-11-29 PROCEDURE — 250N000011 HC RX IP 250 OP 636: Mod: JZ | Performed by: INTERNAL MEDICINE

## 2023-11-29 PROCEDURE — 80069 RENAL FUNCTION PANEL: CPT | Performed by: INTERNAL MEDICINE

## 2023-11-29 PROCEDURE — 250N000011 HC RX IP 250 OP 636: Mod: JZ | Performed by: NEUROLOGICAL SURGERY

## 2023-11-29 PROCEDURE — 99207 PR CDG-CUT & PASTE-POTENTIAL IMPACT ON LEVEL: CPT | Performed by: HOSPITALIST

## 2023-11-29 PROCEDURE — 250N000013 HC RX MED GY IP 250 OP 250 PS 637: Performed by: STUDENT IN AN ORGANIZED HEALTH CARE EDUCATION/TRAINING PROGRAM

## 2023-11-29 PROCEDURE — 92526 ORAL FUNCTION THERAPY: CPT | Mod: GN | Performed by: SPEECH-LANGUAGE PATHOLOGIST

## 2023-11-29 PROCEDURE — 97535 SELF CARE MNGMENT TRAINING: CPT | Mod: GO

## 2023-11-29 PROCEDURE — 97530 THERAPEUTIC ACTIVITIES: CPT | Mod: GO

## 2023-11-29 PROCEDURE — 99233 SBSQ HOSP IP/OBS HIGH 50: CPT | Performed by: HOSPITALIST

## 2023-11-29 RX ADMIN — Medication 2 SPRAY: at 22:17

## 2023-11-29 RX ADMIN — SENNOSIDES AND DOCUSATE SODIUM 1 TABLET: 8.6; 5 TABLET ORAL at 22:13

## 2023-11-29 RX ADMIN — DILTIAZEM HYDROCHLORIDE 180 MG: 180 CAPSULE, COATED, EXTENDED RELEASE ORAL at 09:13

## 2023-11-29 RX ADMIN — NYSTATIN: 100000 CREAM TOPICAL at 10:43

## 2023-11-29 RX ADMIN — HYDRALAZINE HYDROCHLORIDE 10 MG: 10 TABLET ORAL at 13:56

## 2023-11-29 RX ADMIN — Medication 2 SPRAY: at 20:55

## 2023-11-29 RX ADMIN — PANTOPRAZOLE SODIUM 40 MG: 40 INJECTION, POWDER, FOR SOLUTION INTRAVENOUS at 05:14

## 2023-11-29 RX ADMIN — LEVOTHYROXINE SODIUM 100 MCG: 100 TABLET ORAL at 05:21

## 2023-11-29 RX ADMIN — HEPARIN SODIUM 5000 UNITS: 5000 INJECTION, SOLUTION INTRAVENOUS; SUBCUTANEOUS at 09:13

## 2023-11-29 RX ADMIN — Medication 5 ML: at 20:55

## 2023-11-29 RX ADMIN — Medication 400 MG: at 09:13

## 2023-11-29 RX ADMIN — Medication 2 SPRAY: at 13:56

## 2023-11-29 RX ADMIN — HEPARIN SODIUM 5000 UNITS: 5000 INJECTION, SOLUTION INTRAVENOUS; SUBCUTANEOUS at 00:03

## 2023-11-29 RX ADMIN — NYSTATIN: 100000 CREAM TOPICAL at 20:55

## 2023-11-29 RX ADMIN — HEPARIN SODIUM 5000 UNITS: 5000 INJECTION, SOLUTION INTRAVENOUS; SUBCUTANEOUS at 15:54

## 2023-11-29 RX ADMIN — Medication 2 SPRAY: at 09:18

## 2023-11-29 RX ADMIN — TRAZODONE HYDROCHLORIDE 25 MG: 50 TABLET ORAL at 22:13

## 2023-11-29 RX ADMIN — HYDRALAZINE HYDROCHLORIDE 10 MG: 10 TABLET ORAL at 05:21

## 2023-11-29 RX ADMIN — HYDRALAZINE HYDROCHLORIDE 10 MG: 10 TABLET ORAL at 22:13

## 2023-11-29 ASSESSMENT — ACTIVITIES OF DAILY LIVING (ADL)
ADLS_ACUITY_SCORE: 35
ADLS_ACUITY_SCORE: 35
ADLS_ACUITY_SCORE: 31
ADLS_ACUITY_SCORE: 39
ADLS_ACUITY_SCORE: 39
ADLS_ACUITY_SCORE: 35
ADLS_ACUITY_SCORE: 35
ADLS_ACUITY_SCORE: 39
ADLS_ACUITY_SCORE: 39
ADLS_ACUITY_SCORE: 31
ADLS_ACUITY_SCORE: 35
ADLS_ACUITY_SCORE: 35

## 2023-11-29 NOTE — PROGRESS NOTES
Speech Language Therapy Discharge Summary    Reason for therapy discharge:    All goals and outcomes met, no further needs identified.    Progress towards therapy goal(s). See goals on Care Plan in Epic electronic health record for goal details.  Goals met    Therapy recommendation(s):    No further therapy is recommended.    Swallow goals met: continue Easy to chew solids and thin liquids with minimal assistance for tray set up and upright positioning only. Safe for advancement to regular solids, as well, but patient prefers softer and moister food options.

## 2023-11-29 NOTE — PLAN OF CARE
[id number]  Orientation: A&O x4  Activity: Ax2 GBW, q2hr T&R as tolerated  Diet/BS Checks: Easy to chew diet, thin liquids, no straws  Tele:  N/A  IV Access/Drains: R PIV SL  Pain Management: Denies pain this shift  Abnormal VS/Results: VSS on RA  Bowel/Bladder: Incontinent of B/B, Spicer in place. Pt had 1 loose BM this shift  Skin/Wounds: Redness blanchable on coccyx with peeling, mepi in place, incision on back- CHIKI, scattered bruising and scabs  Consults: PT/OT, SLP

## 2023-11-29 NOTE — PROGRESS NOTES
[facility]  Neurosurgery Daily Progress Note    Assessment & Plan   Procedure(s):  Thoracic 11 to pelvis robotic decompression and fusion   -35 Days Post-Op  Chart reviewed. Discharge pending ARU placement. SW following.     Plan:  - Continue PT/OT, wear brace when out of bed   - Pain control measures as needed   - Routine wound care   - Follow up with NSGY clinic at 6 weeks post op with xray prior    - Appreciate assistance from specialties       Tete Mccullough CNP  [facility] Neurosurgery  [address] [address]  Tel [phone]  Pager [phone]

## 2023-11-29 NOTE — PROGRESS NOTES
Care Management Follow Up    Length of Stay (days): 35    Expected Discharge Date: 11/30/2023     Concerns to be Addressed: discharge planning     Patient plan of care discussed at interdisciplinary rounds: Yes    Anticipated Discharge Disposition: Home, Home Care, Dialysis Services, Skilled Nursing Facility, Transitional Care     Anticipated Discharge Services: Transportation Services  Anticipated Discharge DME: Other (see comment) (to be determined)    Patient/family educated on Medicare website which has current facility and service quality ratings:    Education Provided on the Discharge Plan: Yes  Patient/Family in Agreement with the Plan: unable to assess (discussed mutiple discharge choices)    Referrals Placed by CM/SW: External Care Coordination, Specialty Providers  Private pay costs discussed: Not applicable    Additional Information:  Writer sent referral over to [facility] in [place] in the early afternoon.    Writer got a call from Danita over at [facility].  As this is stated that upon further review with their medical director they were not comfortable in proceeding the discussion for the patient to be admitted into ARU with the patient's daughter, April.  Danita stated that the patient needs to be in agreement with doing ARU over at [facility], stating that the patient is not in agreement.     Writer called the patient's daughter April, [phone].  April discussed the patient was hoping to be placed somewhere near her home in [place] and was refusing to do the ARU facility in [place] since it was not close to home.  April stated that the patient would like to go to a TCU, [facility], that is closer to home.  Danita and  came to an agreement and finalizing a discharge plan to a TCU.    Writer recent referral over to [facility].        [facility]  [facility] Work

## 2023-11-29 NOTE — PLAN OF CARE
Goal Outcome Evaluation:    .Patient vital signs are at baseline: Yes  Patient able to ambulate as they were prior to admission or with assist devices provided by therapies during their stay:  No,  Reason:  Up with assistance A2 Yee steady to recliner. Ambulating distances with PT.   Patient MUST void prior to discharge:  No,  Reason:  Spicer in place for retention. Placed 11/21/23.  Patient able to tolerate oral intake:  Yes  Pain has adequate pain control using Oral analgesics:  Yes  Does patient have an identified :  No,  Reason:  TCU pending  Has goal D/C date and time been discussed with patient:  No,  Reason:  TBD pending placement TCU      Bowel medications held. 1 Loose BM today. Informed hospitalist to consider changing to PRN as patient BM's have been more regular and patient not taking much narcotic.

## 2023-11-29 NOTE — PROGRESS NOTES
Shift Summary [id number]     Admitting Diagnosis: Scoliosis of lumbar spine, unspecified scoliosis type. S/P lumbar spinal fusion.     Patient A&Ox4. VSS, on RA. BS check x3 daily with meals, no insulin. PIV SL. On Combined easy to chew diet. T/R. Mepilex on the coccyx for redness. Spicer in place for retention. Awaiting TCU placement

## 2023-11-29 NOTE — PROGRESS NOTES
Patient vital signs are at baseline: Yes  Patient able to ambulate as they were prior to admission or with assist devices provided by therapies during their stay:  No,  Up with assistance of 2 with use of Sera Steady.   Patient MUST void prior to discharge:  No,  Pt has indwelling catheter due to retention.   Patient able to tolerate oral intake:  Yes  Pain has adequate pain control using Oral analgesics:  Yes  Does patient have an identified :  Yes  Has goal D/C date and time been discussed with patient:  Yes    A&O x4, VSS on RA. Back incision is Open to air, No drainage noted. Diet -Combined Level 7 Easy to Chew, thin Liquids, 1 small sip at a time, No straws, Alternate between bites and sips. Takes pills whole 1 at a time. Turn and reposition every 2 hours. Incontinent of bowel. Meplex to coccyx area due to Blanchable redness. Saline Lock patent. Rates pain as a 4, refused pain medication. Pending discharge to TCU TBD.

## 2023-11-29 NOTE — PROGRESS NOTES
[facility]    Hospitalist Progress Note    Date of Service (when I saw the patient): 11/29/2023    Assessment & Plan     Mya Hui is a pleasant 83 year old woman with history of scoliosis, lumbar radiculopathy, HTN, dyslipidemia, DM type 2, hypothyroidism, CKD stage 3, morbid obesity, GERD, sliding hiatal hernia, overactive bladder, OA, chronic right shoulder pain/rotator cuff arthropathy, migraines, and asymptomatic carotid artery stenosis - who was admitted 10/25/2023 for an elective T11 to pelvis robotic decompression and fusion.    Surgery was complicated by development of arrhythmia with significant hypotension and noted 3.5 L blood loss per Op note and Anesthesia post-procedure evaluation note.  She received IV fluids, 6 units of pRBCs and 2 units of FFP intra-op.  ST changes were noted intraoperatively.  Patient remained intubated and was started on Levophed and then transferred to the ICU due to hemorrhagic shock.  She required vasopressor support with Levophed until 10/27.   Transferred out of the ICU with Hospitalist co-managing since 11/03.  Current problems include:     Scoliosis  Lumbar radiculopathy  S/p T11 to pelvis robotic decompression and fusion on 10/25  - 34 days post-op  Acute blood loss anemia with post-op hemorrhagic shock, resolved  Shocked liver - resolved  Type 2 MI due to hemorrhagic shock, improved  Physical deconditioning, multifactorial  - Neurosurgery managing.  - continue PT/OT; recommending ARU.   - SW involved in finding placement at TCU or ARU.   - as of 11/26/23, last oxycodone use was 11/17.  - wear brace when out of bed    - routine wound care   -> follow up with Neurosurgery clinic at 6 weeks post-op with Xray prior    Encephalopathy, multifactorial; resolved  Left basal ganglia restricted diffusion (incidental finding)  Per prior: was noted to be encephalopathic for which Vascular Neurology was consulted.   * Brain MRI was completed and noted  a left basal ganglia small focus of restricted diffusion but not a cause for encephalopathy and felt it was likely multifactorial.    * EEG monitor was completed and negative and subsequently stopped.    * suspect due to uremic encephalopathy and sepsis.  * Encephalopathy improved 11/6.  Continue delirium prevention:  - reorient patient at each interaction.  - keep room lights on and blinds open during day (8am-8pm), low lights 8pm-8am.  - minimize interruptions of sleep at night (consolidate vitals, nursing assessments, medications).  - avoid sedating medications as able.    - Vascular Neurology recommended repeat brain MRI in 2-3 months.     Severe LISA secondary to ATN S/p hemodialysis with subsequent renal recovery (Last HD day 11/17)  Anion gap metabolic acidosis due to above  CKD stage 3a  Hypoalbuminemia  Anasarca  Baseline creatinine between 1.1-1.4 with GFR in the 40-50's.   Due to development of LISA-D, Nephrology was consulted and tunnel dialysis catheter placed 10/30 and has been undergoing HD Mon, Wed, Friday  - last hemodialysis was 11/17.  - had continued drop in creatinine with good urine output.  No more dialysis needed.  Hemodialysis catheter removed 11/24.  - Spicer placed for urinary retention.  - continue Spicer until more mobile.      Overactive bladder  Urinary retention; new since surgery  - holding PTA Ditropan XL 10 mg/d.  - Spicer catheter placed 11/21/23 due to urinary retention.     Acute blood loss anemia   Hb stable ~8 since her initial hemorrhage. 11/17- Hb lower at 7.6   - Nephrology planning to give EPO   - Hgb now 8-9 range.  - monitor with intermittent labs.     Sepsis due to aspiration/ventilator associated pneumonia (Enterobacter, stenotrophomonas and E. Coli)  Acute hypoxic respiratory failure; resolved  Leukocytosis due to above  Per prior: was extubated 11/2.  Also developed aspiration/ventilator associated pneumonia with sputum Cx growing Enterobacter, stenotrophomonas and E.  coli and was initially started on Zoysn and switched to Meropenem on 11/1/2023.  - earlier was on Meropenem (? stopped 11/5)   - completed 14 day course of antibiotics 11/14.  - encourage use of IS/Flutter valve.       Dysphagia - improving  Moderate malnutrition  Failed SLP assessment.   Bedside feeding tube placement coiling due to hiatal hernia.  started PPN 11/10/23, discontinued on 11/18/23.  dysphagia diet per speech  RD consult appreciated     Constipation, with incontinence to stool following laxative use  due to decreased mobility unable to make it to bathroom.  Patient is understandably distressed by this.  Continue senna and MiraLAX to ensure daily bowel movements but hold for frequent loose stools.  had a loose stool on 11/27; continue bowel regimen with hold parameters.     Hypertension  NSVT  continue diltiazem  mg po daily and hydralazine 10 mg po q8h.  holding PTA lisinopril for ATN.     Dyslipidemia  holding PTA pravastatin 40 mg/d to reduce pill burden.     Type 2 DM, controlled; 10/25 A1c = 6.3%  Stress induced hyperglycemia  Blood sugars have been very well-controlled, without insulin   given recent rise will continue to follow blood sugars 3 times daily before meals      Hypothyroidism  continue PTA synthroid.     GERD  Sliding hiatal hernia  continue PTA PPI.     OA  Chronic right shoulder pain/rotator cuff arthropathy  Chronic Pain  pain/analgesic management per Neurosurgery.    as of 11/27, last oxycodone use was 11/17.     Obesity   Suspected ANAYELI  Body mass index is 37.64 kg/m .  - follow up with PCP regarding ongoing management.    - monitor O2 saturations.    - needs outpatient sleep study.       Migraines; no recent recurrence  - continue PRN analgesics      Asymptomatic carotid artery stenosis  - follow up with PCP     Hypomagnesemia   Hypokalemia  - continue monitoring and RN managed protocol.         Diet: Room Service  Combination Diet Easy to Chew (level 7); Thin Liquids (level  0); Thin Liquids (level 0) (one small sip at a time when alert and sitting fully upright. No straws. Alternate between bites/sips.)  Snacks/Supplements Adult: Other; Nepro w/ lunch; oragen jello @ 8 pm; With Meals    DVT Prophylaxis: Heparin SQ  Spicer Catheter: PRESENT, indication: Retention, Strict 1-2 Hour I&O  Lines: None     Cardiac Monitoring: None  Code Status: Full Code          Disposition Plan     Expected Discharge Date: TBD    Destination: ARU  Discharge comments: TCU vs. ARU; Mya and her daughter prefer [facility] at [facility] in [place].    - anticipate follow up by LSW 11/29        Yanick Moura DO  Hospitalist [facility]  Pager: [phone]      Interval History   No new issues. Doing well    Data reviewed today: I reviewed all new labs and imaging results over the last 24 hours.     Physical Exam   Temp: 98  F (36.7  C) Temp src: Oral BP: 126/56 Pulse: 82   Resp: 18 SpO2: 95 % O2 Device: None (Room air)    Vitals:    11/25/23 0857 11/26/23 0607 11/29/23 0519   Weight: 93.7 kg (206 lb 9.1 oz) 93.5 kg (206 lb 2.1 oz) 95 kg (209 lb 7 oz)     Vital Signs with Ranges  Temp:  [98  F (36.7  C)-98.6  F (37  C)] 98  F (36.7  C)  Pulse:  [72-91] 82  Resp:  [17-18] 18  BP: (117-152)/(56-77) 126/56  SpO2:  [95 %] 95 %  I/O last 3 completed shifts:  In: 670 [P.O.:670]  Out: 1050 [Urine:1050]    Constitutional: awake, no apparent distress; resting in bed  HEENT: sclerae clear; MM's moist  Respiratory: good a/e bilaterally, no wheezing or rhonchi  Cardiovascular: regular rate and rhythm, S1, S2 noted; no m/r/g  GI: abdomen flat, + bowel sounds; soft, non-tender, non-distended  Skin/Integumen: no rashes, no cyanosis, no jaundice  Musculoskeletal: no edema  Neurologic: follows directions well; no focal deficits      Medications    dextrose        artificial saliva  2 spray Swish & Spit 4x Daily    B and C vitamin Complex with folic acid  5 mL Oral or Feeding Tube Daily    diltiazem ER COATED BEADS  180  mg Oral Daily    heparin ANTICOAGULANT  5,000 Units Subcutaneous Q8H    hydrALAZINE  10 mg Oral or Feeding Tube Q8H    levothyroxine  100 mcg Oral or NG Tube QAM AC    magnesium oxide  400 mg Oral Daily    nystatin   Topical BID    pantoprazole  40 mg Oral or Feeding Tube QAM AC    Or    pantoprazole  40 mg Intravenous QAM AC    polyethylene glycol  17 g Oral or NG Tube Daily    senna-docusate  1 tablet Oral or NG Tube BID    sodium chloride (PF)  3 mL Intracatheter Q8H    sodium chloride (PF)  9 mL Intracatheter During Dialysis/CRRT (from stock)    traZODone  25 mg Oral At Bedtime       Data   Recent Labs   Lab 11/29/23  1238 11/29/23  0830 11/29/23  0815 11/28/23  1941 11/28/23  1242 11/28/23  0931 11/27/23  0849 11/27/23  0712 11/25/23  0941 11/25/23  0630 11/24/23  0916 11/24/23  0746 11/23/23  0859 11/23/23  0718   WBC  --   --   --   --   --   --   --   --   --  7.0  --  6.1  --  5.7   HGB  --   --   --   --   --  9.3*  --   --   --  8.9*  --  8.7*  --  7.9*   MCV  --   --   --   --   --   --   --   --   --  92  --  94  --  92   PLT  --   --   --   --   --  263  --   --   --  258  --  264  --  239   NA  --   --  139  --   --  139  --  139   < > 134*  --  139  --  140   POTASSIUM  --   --  3.9 3.7  --  3.3*  --  3.5   < > 3.9  --  3.6  --  3.6   CHLORIDE  --   --  106  --   --  103  --  103   < > 98  --  102  --  103   CO2  --   --  25  --   --  25  --  25   < > 23  --  24  --  24   BUN  --   --  24.2*  --   --  25.1*  --  27.4*   < > 33.5*  --  37.3*  --  42.6*   CR  --   --  2.36*  --   --  2.50*  --  2.66*   < > 3.26*  --  3.57*  --  4.02*   ANIONGAP  --   --  8  --   --  11  --  11   < > 13  --  13  --  13   TERESA  --   --  9.1  --   --  9.1  --  9.0   < > 9.0  --  9.0  --  8.7*   * 119* 109*  --    < > 149*   < > 100*   < > 105*   < > 121*   < > 110*   ALBUMIN  --   --  3.2*  --   --  3.4*  --  3.3*   < > 3.2*  --  3.3*  --  3.0*    < > = values in this interval not displayed.

## 2023-11-30 ENCOUNTER — APPOINTMENT (OUTPATIENT)
Dept: PHYSICAL THERAPY | Facility: CLINIC | Age: 83
DRG: 453 | End: 2023-11-30
Attending: NEUROLOGICAL SURGERY
Payer: COMMERCIAL

## 2023-11-30 ENCOUNTER — APPOINTMENT (OUTPATIENT)
Dept: OCCUPATIONAL THERAPY | Facility: CLINIC | Age: 83
DRG: 453 | End: 2023-11-30
Attending: NEUROLOGICAL SURGERY
Payer: COMMERCIAL

## 2023-11-30 LAB
ALBUMIN SERPL BCG-MCNC: 3.2 G/DL (ref 3.5–5.2)
ANION GAP SERPL CALCULATED.3IONS-SCNC: 14 MMOL/L (ref 7–15)
BUN SERPL-MCNC: 22.4 MG/DL (ref 8–23)
CALCIUM SERPL-MCNC: 8.9 MG/DL (ref 8.8–10.2)
CHLORIDE SERPL-SCNC: 103 MMOL/L (ref 98–107)
CREAT SERPL-MCNC: 2.15 MG/DL (ref 0.51–0.95)
DEPRECATED HCO3 PLAS-SCNC: 19 MMOL/L (ref 22–29)
EGFRCR SERPLBLD CKD-EPI 2021: 22 ML/MIN/1.73M2
GLUCOSE BLDC GLUCOMTR-MCNC: 116 MG/DL (ref 70–99)
GLUCOSE BLDC GLUCOMTR-MCNC: 123 MG/DL (ref 70–99)
GLUCOSE BLDC GLUCOMTR-MCNC: 99 MG/DL (ref 70–99)
GLUCOSE SERPL-MCNC: 163 MG/DL (ref 70–99)
PHOSPHATE SERPL-MCNC: 3 MG/DL (ref 2.5–4.5)
POTASSIUM SERPL-SCNC: 4 MMOL/L (ref 3.4–5.3)
SODIUM SERPL-SCNC: 136 MMOL/L (ref 135–145)

## 2023-11-30 PROCEDURE — 97530 THERAPEUTIC ACTIVITIES: CPT | Mod: GP | Performed by: PHYSICAL THERAPIST

## 2023-11-30 PROCEDURE — 250N000013 HC RX MED GY IP 250 OP 250 PS 637: Performed by: INTERNAL MEDICINE

## 2023-11-30 PROCEDURE — 120N000001 HC R&B MED SURG/OB

## 2023-11-30 PROCEDURE — 80069 RENAL FUNCTION PANEL: CPT | Performed by: INTERNAL MEDICINE

## 2023-11-30 PROCEDURE — 250N000011 HC RX IP 250 OP 636: Mod: JZ | Performed by: NEUROLOGICAL SURGERY

## 2023-11-30 PROCEDURE — 97530 THERAPEUTIC ACTIVITIES: CPT | Mod: GO

## 2023-11-30 PROCEDURE — 36415 COLL VENOUS BLD VENIPUNCTURE: CPT | Performed by: INTERNAL MEDICINE

## 2023-11-30 PROCEDURE — 250N000013 HC RX MED GY IP 250 OP 250 PS 637: Performed by: HOSPITALIST

## 2023-11-30 PROCEDURE — 97116 GAIT TRAINING THERAPY: CPT | Mod: GP | Performed by: PHYSICAL THERAPIST

## 2023-11-30 PROCEDURE — 99232 SBSQ HOSP IP/OBS MODERATE 35: CPT | Performed by: HOSPITALIST

## 2023-11-30 PROCEDURE — 97535 SELF CARE MNGMENT TRAINING: CPT | Mod: GO

## 2023-11-30 PROCEDURE — 250N000013 HC RX MED GY IP 250 OP 250 PS 637: Performed by: STUDENT IN AN ORGANIZED HEALTH CARE EDUCATION/TRAINING PROGRAM

## 2023-11-30 PROCEDURE — 250N000011 HC RX IP 250 OP 636: Mod: JZ | Performed by: INTERNAL MEDICINE

## 2023-11-30 PROCEDURE — C9113 INJ PANTOPRAZOLE SODIUM, VIA: HCPCS | Mod: JZ | Performed by: NEUROLOGICAL SURGERY

## 2023-11-30 RX ADMIN — PANTOPRAZOLE SODIUM 40 MG: 40 INJECTION, POWDER, FOR SOLUTION INTRAVENOUS at 05:11

## 2023-11-30 RX ADMIN — Medication 2 SPRAY: at 13:39

## 2023-11-30 RX ADMIN — TRAZODONE HYDROCHLORIDE 25 MG: 50 TABLET ORAL at 21:00

## 2023-11-30 RX ADMIN — HYDRALAZINE HYDROCHLORIDE 10 MG: 10 TABLET ORAL at 21:00

## 2023-11-30 RX ADMIN — HEPARIN SODIUM 5000 UNITS: 5000 INJECTION, SOLUTION INTRAVENOUS; SUBCUTANEOUS at 19:58

## 2023-11-30 RX ADMIN — HEPARIN SODIUM 5000 UNITS: 5000 INJECTION, SOLUTION INTRAVENOUS; SUBCUTANEOUS at 01:57

## 2023-11-30 RX ADMIN — HYDRALAZINE HYDROCHLORIDE 10 MG: 10 TABLET ORAL at 13:39

## 2023-11-30 RX ADMIN — NYSTATIN: 100000 CREAM TOPICAL at 11:40

## 2023-11-30 RX ADMIN — Medication 2 SPRAY: at 09:01

## 2023-11-30 RX ADMIN — LEVOTHYROXINE SODIUM 100 MCG: 100 TABLET ORAL at 05:11

## 2023-11-30 RX ADMIN — Medication 5 ML: at 19:58

## 2023-11-30 RX ADMIN — HYDRALAZINE HYDROCHLORIDE 10 MG: 10 TABLET ORAL at 05:11

## 2023-11-30 RX ADMIN — MICONAZOLE NITRATE: 2 POWDER TOPICAL at 19:59

## 2023-11-30 RX ADMIN — HEPARIN SODIUM 5000 UNITS: 5000 INJECTION, SOLUTION INTRAVENOUS; SUBCUTANEOUS at 11:38

## 2023-11-30 RX ADMIN — DILTIAZEM HYDROCHLORIDE 180 MG: 180 CAPSULE, COATED, EXTENDED RELEASE ORAL at 08:58

## 2023-11-30 RX ADMIN — Medication 2 SPRAY: at 19:59

## 2023-11-30 RX ADMIN — Medication 400 MG: at 08:58

## 2023-11-30 RX ADMIN — NYSTATIN: 100000 CREAM TOPICAL at 19:59

## 2023-11-30 RX ADMIN — Medication 2 SPRAY: at 21:00

## 2023-11-30 ASSESSMENT — ACTIVITIES OF DAILY LIVING (ADL)
ADLS_ACUITY_SCORE: 35

## 2023-11-30 NOTE — PLAN OF CARE
Goal Outcome Evaluation:    Pt A&O X4. She was up to the chair this shift but became uncomfortable and placed back to bed.Pt turned q2 hrs.  Pt pain controled with PRN medications. She ate 75% of her meal. Spicer in place. IV SL. Daughter visited. Incission open to air with no drainage noted.

## 2023-11-30 NOTE — PROGRESS NOTES
Pt AO4.  A2 when OOB, Turn and repo throughout the night, refused second repo.  Spicer present.  VSS on RA.  Denied pain.  Slept in between cares.  Discharge pending placement

## 2023-11-30 NOTE — PROGRESS NOTES
[facility]    Neurosurgery Progress Note    Date of Service (when I saw the patient): 11/30/2023     Assessment & Plan     Procedure(s):  Thoracic 11 to pelvis robotic decompression and fusion   -36 Days Post-Op  Chart reviewed. Patient doing well per nursing note, no new issues.     Plan:  - Discharge pending ARU placement, SW following  - Continue PT/OT, wear brace when out of bed   - Pain control measures as needed   - Routine wound care   - Follow up with NSGY clinic at 6 weeks post op with xray prior    - Appreciate assistance from specialties

## 2023-11-30 NOTE — PROGRESS NOTES
Care Management Follow Up    Length of Stay (days): 36    Expected Discharge Date: 12/02/2023     Concerns to be Addressed: discharge planning     Patient plan of care discussed at interdisciplinary rounds: Yes    Anticipated Discharge Disposition: Transitional Care. Referrals are out pending review.     Anticipated Discharge Services: Transportation Services  Anticipated Discharge DME: Other (see comment) (to be determined)    Patient/family educated on Medicare website which has current facility and service quality ratings:    Education Provided on the Discharge Plan: Yes  Patient/Family in Agreement with the Plan: unable to assess (discussed mutiple discharge choices)    Referrals Placed by CM/SW: External Care Coordination, Specialty Providers  Private pay costs discussed:     Additional Information:  THELMA contacted [facility] admissions and updated that pt no longer dialysis pt. Requested review of referral.  THELMA contacted [facility] and spoke with Francoise in admissions. Francoise  will review referral and THELMA sent referral again just to make sure she had the information.  1:38 THELMA received call from Francoise at [facility]. She requested return call as she had questions. THELMA returned call and left response on voicemail and requested return call.    THELMA received call from Francoise regarding assessment. They are accepting pt clinically and will run pt's insurance. Pt may be able to discharge to [place] pending insurance and medical clearance.    GAMALIEL Jo  [facility]  Care Transitions

## 2023-11-30 NOTE — PROGRESS NOTES
[facility]    Hospitalist Progress Note    Date of Service (when I saw the patient): 11/30/2023    Assessment & Plan     Mya Hui is a pleasant 83 year old woman with history of scoliosis, lumbar radiculopathy, HTN, dyslipidemia, DM type 2, hypothyroidism, CKD stage 3, morbid obesity, GERD, sliding hiatal hernia, overactive bladder, OA, chronic right shoulder pain/rotator cuff arthropathy, migraines, and asymptomatic carotid artery stenosis - who was admitted 10/25/2023 for an elective T11 to pelvis robotic decompression and fusion.    Surgery was complicated by development of arrhythmia with significant hypotension and noted 3.5 L blood loss per Op note and Anesthesia post-procedure evaluation note.  She received IV fluids, 6 units of pRBCs and 2 units of FFP intra-op.  ST changes were noted intraoperatively.  Patient remained intubated and was started on Levophed and then transferred to the ICU due to hemorrhagic shock.  She required vasopressor support with Levophed until 10/27.   Transferred out of the ICU with Hospitalist co-managing since 11/03.  Current problems include:      Scoliosis  Lumbar radiculopathy  S/p T11 to pelvis robotic decompression and fusion on 10/25  - 34 days post-op  Acute blood loss anemia with post-op hemorrhagic shock, resolved  Shocked liver - resolved  Type 2 MI due to hemorrhagic shock, improved  Physical deconditioning, multifactorial  - Neurosurgery managing.  - continue PT/OT; recommending ARU.   - SW involved in finding placement at TCU or ARU.   - as of 11/26/23, last oxycodone use was 11/17.  - wear brace when out of bed    - routine wound care   -> follow up with Neurosurgery clinic at 6 weeks post-op with Xray prior    Encephalopathy, multifactorial; resolved  Left basal ganglia restricted diffusion (incidental finding)  Per prior: was noted to be encephalopathic for which Vascular Neurology was consulted.   * Brain MRI was completed and  noted a left basal ganglia small focus of restricted diffusion but not a cause for encephalopathy and felt it was likely multifactorial.    * EEG monitor was completed and negative and subsequently stopped.    * suspect due to uremic encephalopathy and sepsis.  * Encephalopathy improved 11/6.  Continue delirium prevention:  - reorient patient at each interaction.  - keep room lights on and blinds open during day (8am-8pm), low lights 8pm-8am.  - minimize interruptions of sleep at night (consolidate vitals, nursing assessments, medications).  - avoid sedating medications as able.    - Vascular Neurology recommended repeat brain MRI in 2-3 months.     Severe LISA secondary to ATN S/p hemodialysis with subsequent renal recovery (Last HD day 11/17)  Anion gap metabolic acidosis due to above  CKD stage 3a  Hypoalbuminemia  Anasarca  Baseline creatinine between 1.1-1.4 with GFR in the 40-50's.   Due to development of LISA-D, Nephrology was consulted and tunnel dialysis catheter placed 10/30 and has been undergoing HD Mon, Wed, Friday  - last hemodialysis was 11/17.  - had continued drop in creatinine with good urine output.  No more dialysis needed.  Hemodialysis catheter removed 11/24.  - Spicer placed for urinary retention.  - continue Spicer until more mobile.      Overactive bladder  Urinary retention; new since surgery  - holding PTA Ditropan XL 10 mg/d.  - Spicer catheter placed 11/21/23 due to urinary retention.     Acute blood loss anemia   Hb stable ~8 since her initial hemorrhage. 11/17- Hb lower at 7.6   - Nephrology planning to give EPO   - Hgb now 8-9 range.  - monitor with intermittent labs.     Sepsis due to aspiration/ventilator associated pneumonia (Enterobacter, stenotrophomonas and E. Coli)  Acute hypoxic respiratory failure; resolved  Leukocytosis due to above  Per prior: was extubated 11/2  Also developed aspiration/ventilator associated pneumonia with sputum Cx growing Enterobacter, stenotrophomonas and  E. coli and was initially started on Zoysn and switched to Meropenem on 11/1/2023.  - earlier was on Meropenem (? stopped 11/5)   - completed 14 day course of antibiotics 11/14.  - encourage use of IS/Flutter valve.       Dysphagia - improving  Moderate malnutrition  Failed SLP assessment.   Bedside feeding tube placement coiling due to hiatal hernia.  started PPN 11/10/23, discontinued on 11/18/23.  dysphagia diet per speech  RD consult appreciated     Constipation, with incontinence to stool following laxative use  due to decreased mobility unable to make it to bathroom.  Patient is understandably distressed by this.  Continue senna and MiraLAX to ensure daily bowel movements but hold for frequent loose stools.  had a loose stool on 11/27; continue bowel regimen with hold parameters.     Hypertension  NSVT  continue diltiazem  mg po daily and hydralazine 10 mg po q8h.  holding PTA lisinopril for ATN.     Dyslipidemia  holding PTA pravastatin 40 mg/d to reduce pill burden.     Type 2 DM, controlled; 10/25 A1c = 6.3%  Stress induced hyperglycemia  Blood sugars have been very well-controlled, without insulin   given recent rise will continue to follow blood sugars 3 times daily before meals      Hypothyroidism  continue PTA synthroid.     GERD  Sliding hiatal hernia  continue PTA PPI.     OA  Chronic right shoulder pain/rotator cuff arthropathy  Chronic Pain  pain/analgesic management per Neurosurgery.    as of 11/27, last oxycodone use was 11/17.     Obesity   Suspected ANAYELI  Body mass index is 37.64 kg/m .  - follow up with PCP regarding ongoing management.    - monitor O2 saturations.    - needs outpatient sleep study.       Migraines; no recent recurrence  - continue PRN analgesics      Asymptomatic carotid artery stenosis  - follow up with PCP     Hypomagnesemia   Hypokalemia  - continue monitoring and RN managed protocol.         Diet: Room Service  Combination Diet Easy to Chew (level 7); Thin Liquids  (level 0); Thin Liquids (level 0) (one small sip at a time when alert and sitting fully upright. No straws. Alternate between bites/sips.)  Snacks/Supplements Adult: Other; Nepro w/ lunch; oragen jello @ 8 pm; With Meals    DVT Prophylaxis: Heparin SQ  Spicer Catheter: PRESENT, indication: Retention, Strict 1-2 Hour I&O  Lines: None     Cardiac Monitoring: None  Code Status: Full Code          Disposition Plan     Expected Discharge Date: TBD    Destination: ARU  Discharge comments: TCU vs. ARU; Mya and her daughter prefer [facility]U at [facility] in [place].    - anticipate follow up by LSW 11/29        Yanick Moura   Hospitalist [facility]  Pager: [phone]      Interval History   No new issues. Doing well  Awaiting TCU  She reports increasing activity tolerance    Data reviewed today: I reviewed all new labs and imaging results over the last 24 hours.     Physical Exam   Temp: 97.2  F (36.2  C) Temp src: Axillary BP: 131/77 Pulse: 88   Resp: 18 SpO2: 93 % O2 Device: None (Room air)    Vitals:    11/26/23 0607 11/29/23 0519 11/30/23 0628   Weight: 93.5 kg (206 lb 2.1 oz) 95 kg (209 lb 7 oz) 102 kg (224 lb 13.9 oz)     Vital Signs with Ranges  Temp:  [97.2  F (36.2  C)-98.9  F (37.2  C)] 97.2  F (36.2  C)  Pulse:  [75-88] 88  Resp:  [18] 18  BP: (117-141)/(60-78) 131/77  SpO2:  [93 %-95 %] 93 %  I/O last 3 completed shifts:  In: 240 [P.O.:240]  Out: 1200 [Urine:1200]    Constitutional: awake, no apparent distress; resting in bed  HEENT: sclerae clear; MM's moist  Respiratory: good a/e bilaterally, no wheezing or rhonchi  Cardiovascular: regular rate and rhythm, S1, S2 noted; no m/r/g  GI: abdomen flat, + bowel sounds; soft, non-tender, non-distended  Skin/Integumen: no rashes, no cyanosis, no jaundice  Musculoskeletal: no edema  Neurologic: follows directions well; no focal deficits      Medications    dextrose        artificial saliva  2 spray Swish & Spit 4x Daily    B and C vitamin Complex with  folic acid  5 mL Oral or Feeding Tube Daily    diltiazem ER COATED BEADS  180 mg Oral Daily    heparin ANTICOAGULANT  5,000 Units Subcutaneous Q8H    hydrALAZINE  10 mg Oral or Feeding Tube Q8H    levothyroxine  100 mcg Oral or NG Tube QAM AC    magnesium oxide  400 mg Oral Daily    nystatin   Topical BID    pantoprazole  40 mg Oral or Feeding Tube QAM AC    Or    pantoprazole  40 mg Intravenous QAM AC    polyethylene glycol  17 g Oral or NG Tube Daily    senna-docusate  1 tablet Oral or NG Tube BID    sodium chloride (PF)  3 mL Intracatheter Q8H    sodium chloride (PF)  9 mL Intracatheter During Dialysis/CRRT (from stock)    traZODone  25 mg Oral At Bedtime       Data   Recent Labs   Lab 11/30/23  1351 11/30/23  1126 11/30/23  0837 11/29/23  0830 11/29/23  0815 11/28/23  1941 11/28/23  1242 11/28/23  0931 11/25/23  0941 11/25/23  0630 11/24/23  0916 11/24/23  0746   WBC  --   --   --   --   --   --   --   --   --  7.0  --  6.1   HGB  --   --   --   --   --   --   --  9.3*  --  8.9*  --  8.7*   MCV  --   --   --   --   --   --   --   --   --  92  --  94   PLT  --   --   --   --   --   --   --  263  --  258  --  264   NA  --  136  --   --  139  --   --  139   < > 134*  --  139   POTASSIUM  --  4.0  --   --  3.9 3.7  --  3.3*   < > 3.9  --  3.6   CHLORIDE  --  103  --   --  106  --   --  103   < > 98  --  102   CO2  --  19*  --   --  25  --   --  25   < > 23  --  24   BUN  --  22.4  --   --  24.2*  --   --  25.1*   < > 33.5*  --  37.3*   CR  --  2.15*  --   --  2.36*  --   --  2.50*   < > 3.26*  --  3.57*   ANIONGAP  --  14  --   --  8  --   --  11   < > 13  --  13   TERESA  --  8.9  --   --  9.1  --   --  9.1   < > 9.0  --  9.0   GLC 99 163* 116*   < > 109*  --    < > 149*   < > 105*   < > 121*   ALBUMIN  --  3.2*  --   --  3.2*  --   --  3.4*   < > 3.2*  --  3.3*    < > = values in this interval not displayed.

## 2023-11-30 NOTE — PROGRESS NOTES
Care Management Follow Up    Length of Stay (days): 36    Expected Discharge Date: 12/02/2023     Concerns to be Addressed: discharge planning     Patient plan of care discussed at interdisciplinary rounds: Yes    Anticipated Discharge Disposition:  Skilled Nursing Facility, Transitional Care     Anticipated Discharge Services: Transportation Services  Anticipated Discharge DME: Other (see comment) (to be determined)    Patient/family educated on Medicare website which has current facility and service quality ratings:    Education Provided on the Discharge Plan: Yes  Patient/Family in Agreement with the Plan: unable to assess (discussed mutiple discharge choices)    Referrals Placed by CM/SW: External Care Coordination, Specialty Providers  Private pay costs discussed: Not applicable    Additional Information:  Received call from [place] with admissions with [facility] bed/SNF with update that they do not have bed availability at any of their locations ([facility] or [facility]).      Other referrals still pending and CM team following for discharge.    Elaina Almanzar RN, BS  Care Coordinator  [email or web address]

## 2023-12-01 ENCOUNTER — APPOINTMENT (OUTPATIENT)
Dept: OCCUPATIONAL THERAPY | Facility: CLINIC | Age: 83
DRG: 453 | End: 2023-12-01
Attending: NEUROLOGICAL SURGERY
Payer: COMMERCIAL

## 2023-12-01 VITALS
DIASTOLIC BLOOD PRESSURE: 95 MMHG | RESPIRATION RATE: 18 BRPM | SYSTOLIC BLOOD PRESSURE: 160 MMHG | OXYGEN SATURATION: 95 % | BODY MASS INDEX: 37.47 KG/M2 | WEIGHT: 224.87 LBS | TEMPERATURE: 97 F | HEIGHT: 65 IN | HEART RATE: 96 BPM

## 2023-12-01 LAB
GLUCOSE BLDC GLUCOMTR-MCNC: 103 MG/DL (ref 70–99)
GLUCOSE BLDC GLUCOMTR-MCNC: 109 MG/DL (ref 70–99)

## 2023-12-01 PROCEDURE — 97535 SELF CARE MNGMENT TRAINING: CPT | Mod: GO

## 2023-12-01 PROCEDURE — 250N000011 HC RX IP 250 OP 636: Mod: JZ | Performed by: NEUROLOGICAL SURGERY

## 2023-12-01 PROCEDURE — 250N000011 HC RX IP 250 OP 636: Mod: JZ | Performed by: INTERNAL MEDICINE

## 2023-12-01 PROCEDURE — 250N000013 HC RX MED GY IP 250 OP 250 PS 637: Performed by: HOSPITALIST

## 2023-12-01 PROCEDURE — 250N000013 HC RX MED GY IP 250 OP 250 PS 637: Performed by: NURSE PRACTITIONER

## 2023-12-01 PROCEDURE — 250N000013 HC RX MED GY IP 250 OP 250 PS 637: Performed by: INTERNAL MEDICINE

## 2023-12-01 PROCEDURE — 99239 HOSP IP/OBS DSCHRG MGMT >30: CPT | Performed by: HOSPITALIST

## 2023-12-01 PROCEDURE — 250N000013 HC RX MED GY IP 250 OP 250 PS 637: Performed by: STUDENT IN AN ORGANIZED HEALTH CARE EDUCATION/TRAINING PROGRAM

## 2023-12-01 PROCEDURE — C9113 INJ PANTOPRAZOLE SODIUM, VIA: HCPCS | Mod: JZ | Performed by: NEUROLOGICAL SURGERY

## 2023-12-01 RX ORDER — HYDROXYZINE HYDROCHLORIDE 10 MG/1
10 TABLET, FILM COATED ORAL EVERY 6 HOURS PRN
DISCHARGE
Start: 2023-12-01 | End: 2024-01-16

## 2023-12-01 RX ORDER — OXYCODONE HYDROCHLORIDE 5 MG/1
5 TABLET ORAL EVERY 6 HOURS PRN
Qty: 10 TABLET | Refills: 0 | Status: SHIPPED | OUTPATIENT
Start: 2023-12-01 | End: 2024-01-16

## 2023-12-01 RX ORDER — POLYETHYLENE GLYCOL 3350 17 G/17G
17 POWDER, FOR SOLUTION ORAL DAILY
DISCHARGE
Start: 2023-12-02 | End: 2024-05-22

## 2023-12-01 RX ORDER — ONDANSETRON 4 MG/1
4 TABLET, ORALLY DISINTEGRATING ORAL EVERY 6 HOURS PRN
DISCHARGE
Start: 2023-12-01 | End: 2024-01-16

## 2023-12-01 RX ORDER — DILTIAZEM HYDROCHLORIDE 180 MG/1
180 CAPSULE, COATED, EXTENDED RELEASE ORAL DAILY
DISCHARGE
Start: 2023-12-02 | End: 2024-01-16

## 2023-12-01 RX ORDER — HYDRALAZINE HYDROCHLORIDE 10 MG/1
10 TABLET, FILM COATED ORAL EVERY 8 HOURS
DISCHARGE
Start: 2023-12-01 | End: 2024-01-12

## 2023-12-01 RX ORDER — PANTOPRAZOLE SODIUM 40 MG/1
40 TABLET, DELAYED RELEASE ORAL DAILY
DISCHARGE
Start: 2023-12-01 | End: 2024-01-16

## 2023-12-01 RX ORDER — TRAZODONE HYDROCHLORIDE 50 MG/1
25 TABLET, FILM COATED ORAL AT BEDTIME
DISCHARGE
Start: 2023-12-01 | End: 2024-01-16

## 2023-12-01 RX ORDER — B COMPLEX C NO.10/FOLIC ACID 900MCG/5ML
5 LIQUID (ML) ORAL DAILY
DISCHARGE
Start: 2023-12-01 | End: 2024-01-16

## 2023-12-01 RX ORDER — AMOXICILLIN 250 MG
1 CAPSULE ORAL 2 TIMES DAILY PRN
DISCHARGE
Start: 2023-12-01 | End: 2024-02-04

## 2023-12-01 RX ADMIN — HEPARIN SODIUM 5000 UNITS: 5000 INJECTION, SOLUTION INTRAVENOUS; SUBCUTANEOUS at 11:31

## 2023-12-01 RX ADMIN — Medication 400 MG: at 08:50

## 2023-12-01 RX ADMIN — HEPARIN SODIUM 5000 UNITS: 5000 INJECTION, SOLUTION INTRAVENOUS; SUBCUTANEOUS at 03:35

## 2023-12-01 RX ADMIN — Medication 2 SPRAY: at 08:50

## 2023-12-01 RX ADMIN — PANTOPRAZOLE SODIUM 40 MG: 40 INJECTION, POWDER, FOR SOLUTION INTRAVENOUS at 06:22

## 2023-12-01 RX ADMIN — DILTIAZEM HYDROCHLORIDE 180 MG: 180 CAPSULE, COATED, EXTENDED RELEASE ORAL at 08:50

## 2023-12-01 RX ADMIN — LEVOTHYROXINE SODIUM 100 MCG: 100 TABLET ORAL at 06:21

## 2023-12-01 RX ADMIN — HYDRALAZINE HYDROCHLORIDE 10 MG: 10 TABLET ORAL at 06:22

## 2023-12-01 RX ADMIN — ACETAMINOPHEN 650 MG: 325 TABLET, FILM COATED ORAL at 11:31

## 2023-12-01 RX ADMIN — NYSTATIN: 100000 CREAM TOPICAL at 11:35

## 2023-12-01 ASSESSMENT — ACTIVITIES OF DAILY LIVING (ADL)
ADLS_ACUITY_SCORE: 36
ADLS_ACUITY_SCORE: 35
ADLS_ACUITY_SCORE: 35
ADLS_ACUITY_SCORE: 36
ADLS_ACUITY_SCORE: 36
ADLS_ACUITY_SCORE: 35
ADLS_ACUITY_SCORE: 35

## 2023-12-01 NOTE — PROGRESS NOTES
Care Management Discharge Note    Discharge Date: 12/01/2023       Discharge Disposition: Transitional Care    Discharge Services: Transportation Services    Discharge DME: Other (see comment) (to be determined)    Discharge Transportation: family or friend will provide    Private pay costs discussed: transportation costs    Does the patient's insurance plan have a 3 day qualifying hospital stay waiver?  Yes     Which insurance plan 3 day waiver is available? Alternative insurance waiver    Will the waiver be used for post-acute placement? No    PAS Confirmation Code: [id number]  Patient/family educated on Medicare website which has current facility and service quality ratings:      Education Provided on the Discharge Plan: Yes  Persons Notified of Discharge Plans: patient, daughter, TCU, [facility] nursing staff  Patient/Family in Agreement with the Plan: unable to assess (discussed mutiple discharge choices)    Handoff Referral Completed: No    Additional Information:  Patient will discharge from [facility] to [facility] in [place] with [facility] W/C  at [phone].  Please refer to  progress notes for further details.      GAMALIEL Pang  [facility]  Social Work

## 2023-12-01 NOTE — PROGRESS NOTES
Care Management Follow Up    Length of Stay (days): 37    Expected Discharge Date: 12/02/2023     Concerns to be Addressed: discharge planning     Patient plan of care discussed at interdisciplinary rounds: Yes    Anticipated Discharge Disposition: Transitional Care     Anticipated Discharge Services: Transportation Services  Anticipated Discharge DME: Other (see comment) (to be determined)    Patient/family educated on Medicare website which has current facility and service quality ratings:    Education Provided on the Discharge Plan: Yes  Patient/Family in Agreement with the Plan: unable to assess (discussed mutiple discharge choices)    Referrals Placed by CM/SW: External Care Coordination, Specialty Providers  Private pay costs discussed: Not applicable    Additional Information:  Per previous SW note, [facility], but were waiting on insurance auth.     Per [facility], Writer received a messaged the referral was declined due to no bed availability.    Writer called [facility], [phone], and [phone] and left a VM for a clarification.     Writer got a call back from Francoise who clarified that the decline was an error and that they can accept the patient today. Francoise stated the earlier the better in transporting the patient.     Writer will call the patient's daughter to update and update the patient.     Addendum 1146:  Writer sent scripts to [facility] [phone].    Writer completed PAS.   PAS-RR    D: Per DHS regulation, THELMA completed and submitted PAS-RR to [place] [employer] Direct Connect via the Senior [employer] Line.  PAS-RR confirmation # is : [id number]     I: SW spoke with patient and they are aware a PAS-RR has been submitted.  THELMA reviewed with patient that they may be contacted for a follow up appointment within 10 days of hospital discharge if their SNF stay is < 30 days.  Contact information for [employer] was also provided.    A: patient verbalized  understanding.    P: Further questions may be directed to [employer] Line at #[phone], option #4 for PAS-RR staff.    Writer sent orders via DOD.     Writer got a call back from the daughter who stated they would like the writer to set up transportation. Writer discussed potential out of pocket costs which daughter stated she was aware of.     Writer spoke with bedside rn who stated the patient is ok to go in a wheelchair.     Writer called [facility] and set up a W/C ride with Fernanda between [phone].    Writer updated the patient, [facility] nursing staff, and Francoise badillo at [facility].         Jose D Gonzalez, GAMALIEL  [facility]  Social Work

## 2023-12-01 NOTE — DISCHARGE SUMMARY
Discharge Summary  Hospitalist    Date of Admission:  10/25/2023  Date of Discharge:  12/1/2023  Discharging Provider: Vanessa Rome MD, MD  Date of Service (when I saw the patient): 12/01/23    Discharge Diagnoses   Scoliosis  Lumbar radiculopathy  S/p T11 to pelvis robotic decompression and fusion on 10/25  Acute blood loss anemia with post-op hemorrhagic shock, resolved  Shock liver - resolved  Type 2 MI due to hemorrhagic shock, improved  Physical deconditioning, multifactorial    History of Present Illness   Please refer H & P for details.      Hospital Course   Mya Hui is a pleasant 83 year old woman with history of scoliosis, lumbar radiculopathy, HTN, dyslipidemia, DM type 2, hypothyroidism, CKD stage 3, morbid obesity, GERD, sliding hiatal hernia, overactive bladder, OA, chronic right shoulder pain/rotator cuff arthropathy, migraines, and asymptomatic carotid artery stenosis - who was admitted 10/25/2023 for an elective T11 to pelvis robotic decompression and fusion.     Surgery was complicated by development of arrhythmia with significant hypotension and noted 3.5 L blood loss per Op note and Anesthesia post-procedure evaluation note.  She received IV fluids, 6 units of pRBCs and 2 units of FFP intra-op.  ST changes were noted intraoperatively.  Patient remained intubated and was started on Levophed and then transferred to the ICU due to hemorrhagic shock.  She required vasopressor support with Levophed until 10/27.   Transferred out of the ICU with Hospitalist co-managing since 11/03.  Current problems include:        Scoliosis  Lumbar radiculopathy  S/p T11 to pelvis robotic decompression and fusion on 10/25  Acute blood loss anemia with post-op hemorrhagic shock, resolved  Shock liver - resolved  Type 2 MI due to hemorrhagic shock, improved  Physical deconditioning, multifactorial  - Neurosurgery managing.  - continue PT/OT; recommending ARU.   - SW involved in finding placement at TCU or  ARU.   - oxycodone PRN- using occasionally  - wear brace when out of bed    - routine wound care   -> follow up with Neurosurgery clinic at 6 weeks post-op with Xray prior     Encephalopathy, multifactorial; resolved  Left basal ganglia restricted diffusion (incidental finding)  Per prior: was noted to be encephalopathic for which Vascular Neurology was consulted.   * Brain MRI was completed and noted a left basal ganglia small focus of restricted diffusion but not a cause for encephalopathy and felt it was likely multifactorial.    * EEG monitor was completed and negative and subsequently stopped.    * suspect due to uremic encephalopathy and sepsis.  * Encephalopathy improved 11/6.  Continue delirium prevention:  - reorient patient at each interaction.  - keep room lights on and blinds open during day (8am-8pm), low lights 8pm-8am.  - minimize interruptions of sleep at night (consolidate vitals, nursing assessments, medications).  - avoid sedating medications as able.    - Vascular Neurology recommended repeat brain MRI in 2-3 months.     Severe LISA secondary to ATN S/p hemodialysis with subsequent renal recovery (Last HD  11/17)  Anion gap metabolic acidosis due to above  CKD stage 3a  Hypoalbuminemia  Anasarca  Baseline creatinine between 1.1-1.4 with GFR in the 40-50's.   Due to development of LISA, Nephrology was consulted and tunneled dialysis catheter placed 10/30 and underwent HD Mon, Wed, Friday  - last hemodialysis was 11/17.  - had continued drop in creatinine with good urine output.  No more dialysis needed.  Hemodialysis catheter removed 11/24. Creatinine 2.15 on 11/30.(Peak at 8.3 on 10/30)  - Sykes placed for urinary retention.  - continue Sykes until more mobile. Plan to discharge with sykes.  -Follow-up with nephrology clinic as outpatient.  Ordered follow-up labs in a week.      Overactive bladder  Urinary retention; new since surgery  - holding PTA Ditropan XL 10 mg/d.  - Sykes catheter placed  11/21/23 due to urinary retention.  Can do trial of void at TCU when she is more mobile.     Acute blood loss anemia   Hb stable ~8 since her initial hemorrhage. 11/17- Hb lower at 7.6   - EPO  per Nephrology  - Hgb now 8-9 range.  - monitor with intermittent labs.     Sepsis due to aspiration/ventilator associated pneumonia (Enterobacter, stenotrophomonas and E. Coli)  Acute hypoxic respiratory failure; resolved  Leukocytosis due to above  Per prior: was extubated 11/2  Also developed aspiration/ventilator associated pneumonia with sputum Cx growing Enterobacter, stenotrophomonas and E. coli and was initially started on Zoysn and switched to Meropenem on 11/1/2023.  - earlier was on Meropenem (? stopped 11/5)   - completed 14 day course of antibiotics 11/14.  - encourage use of IS/Flutter valve.       Dysphagia - improving  Moderate malnutrition  Failed SLP assessment.   Bedside feeding tube placement coiling due to hiatal hernia.  started PPN 11/10/23, discontinued on 11/18/23.  dysphagia diet per speech  RD consult appreciated     Constipation, with incontinence to stool following laxative use  due to decreased mobility unable to make it to bathroom.  Patient is understandably distressed by this.  Continue senna and MiraLAX to ensure daily bowel movements but hold for frequent loose stools.  had a loose stool on 11/27; continue bowel regimen with hold parameters.     Hypertension  NSVT  continue diltiazem  mg po daily and hydralazine 10 mg po q8h.  holding PTA lisinopril for ATN.     Dyslipidemia  holding PTA pravastatin 40 mg/d to reduce pill burden.  Resumed at discharge     Type 2 DM, controlled; 10/25 A1c = 6.3%  Stress induced hyperglycemia  Blood sugars have been very well-controlled, without insulin   given recent rise will continue to follow blood sugars 2 times daily before meals      Hypothyroidism  continue PTA synthroid.     GERD  Sliding hiatal hernia  continue PTA PPI.     OA  Chronic right  shoulder pain/rotator cuff arthropathy  Chronic Pain  pain/analgesic management per Neurosurgery.  As needed oxycodone prescribed at discharge-use sparingly.        Obesity   Suspected ANAYELI  Body mass index is 37.64 kg/m .  - follow up with PCP regarding ongoing management.    - monitor O2 saturations.    - needs outpatient sleep study.       Migraines; no recent recurrence  - continue PRN analgesics      Asymptomatic carotid artery stenosis  - follow up with PCP     Hypomagnesemia   Hypokalemia  - continue monitoring and RN managed protocol.      Patient is discharged to TCU in stable condition    Vanessa Rome MD, MD      Pending Results   These results will be followed up by Hospitalist team.  Unresulted Labs Ordered in the Past 30 Days of this Admission       Date and Time Order Name Status Description    10/25/2023  5:02 PM Prepare plasma (unit) Preliminary     10/25/2023  5:02 PM Prepare plasma (unit) Preliminary     10/25/2023  5:01 PM Prepare pheresed platelets (unit) Preliminary             Code Status   Full Code       Primary Care Physician   Kisha Stinson    Follow-ups Needed After Discharge   Follow-up Appointments     Follow Up      [facility]  [phone]  4 weeks        Follow Up and recommended labs and tests      Follow up with assisted physician.  The following labs/tests are   recommended: BMP, CBC in 1 week.  Follow up with Neurosurgery clinic at 6 weeks post op with lumbar spine   xray prior.  Follow up with Nephrology as arranged/ recommended by them.            Physical Exam   Temp: 97  F (36.1  C) Temp src: Oral BP: (!) 160/95 Pulse: 96   Resp: 18 SpO2: 95 % O2 Device: None (Room air)    Vitals:    11/26/23 0607 11/29/23 0519 11/30/23 0628   Weight: 93.5 kg (206 lb 2.1 oz) 95 kg (209 lb 7 oz) 102 kg (224 lb 13.9 oz)     Vital Signs with Ranges  Temp:  [97  F (36.1  C)-98.2  F (36.8  C)] 97  F (36.1  C)  Pulse:  [76-96] 96  Resp:  [18] 18  BP:  (131-160)/(65-95) 160/95  SpO2:  [93 %-95 %] 95 %  I/O last 3 completed shifts:  In: 560 [P.O.:560]  Out: 1000 [Urine:1000]    Constitutional: Alert, cooperative, no apparent distress, was sitting up in recliner  Respiratory: Non labored breathing  Cardiovascular: Regular rate and rhythm,  no edema  GI: Normal bowel sounds, soft, non-distended, non-tender  Skin: No obvious rash, was wearing spinal brace  Neuro: Alert, engages in appropriate conversation, fluent speech, moving all extremities, no facial asymmetry  Psych: Calm and pleasant, no obvious anxiety/ depression      Discharge Disposition   Discharged to short-term care facility  Condition at discharge: Stable    Consultations This Hospital Stay   OCCUPATIONAL THERAPY ADULT IP CONSULT  PHYSICAL THERAPY ADULT IP CONSULT  ORTHOSIS SPINAL IP CONSULT  HOSPITALIST IP CONSULT  INTENSIVIST IP CONSULT  PHARMACY IP CONSULT  CARE MANAGEMENT / SOCIAL WORK IP CONSULT  NEPHROLOGY IP CONSULT  NUTRITION SERVICES ADULT IP CONSULT  PHARMACY IP CONSULT  INTERVENTIONAL RADIOLOGY ADULT/PEDS IP CONSULT  NEUROLOGY IP CONSULT  NEUROLOGY CRITICAL CARE ADULT IP CONSULT  PHARMACY TO DOSE VANCO  VASCULAR ACCESS ADULT IP CONSULT  VASCULAR ACCESS ADULT IP CONSULT  SPEECH LANGUAGE PATH ADULT IP CONSULT  OCCUPATIONAL THERAPY ADULT IP CONSULT  WOUND OSTOMY CONTINENCE NURSE  IP CONSULT  PHARMACY/NUTRITION TO START AND MANAGE TPN  PHARMACY IP CONSULT  VASCULAR ACCESS ADULT IP CONSULT  PHARMACY IP CONSULT  VASCULAR ACCESS ADULT IP CONSULT  INTERVENTIONAL RADIOLOGY ADULT/PEDS IP CONSULT  PHYSICAL THERAPY ADULT IP CONSULT  OCCUPATIONAL THERAPY ADULT IP CONSULT  PHYSICAL THERAPY ADULT IP CONSULT    Time Spent on this Encounter   Vanessa TRAYLOR MD, personally saw the patient today and spent greater than 30 minutes discharging this patient.    Discharge Orders      MR Brain w/o & w Contrast     Medication Therapy Management Referral      Follow Up    [facility]  [phone]  4 weeks      General info for SNF    Length of Stay Estimate: Short Term Care: Estimated # of Days <30  Condition at Discharge: Stable  Level of care:skilled   Rehabilitation Potential: Good  Admission H&P remains valid and up-to-date: Yes  Recent Chemotherapy: N/A  Use Nursing Home Standing Orders: Yes     Mantoux instructions    Give two-step Mantoux (PPD) Per Facility Policy Yes     Follow Up and recommended labs and tests    Follow up with senior living physician.  The following labs/tests are recommended: BMP, CBC in 1 week.  Follow up with Neurosurgery clinic at 6 weeks post op with lumbar spine xray prior.  Follow up with Nephrology as arranged/ recommended by them.     Reason for your hospital stay    Lumbar spinal fusion surgery     Intake and output    Every shift     Spicer catheter    To straight gravity drainage. Change catheter every 2 weeks and PRN for leaking or decreased urine output with signs of bladder distention. DO NOT change catheter without a specific Provider order IF diagnosis of benign prostatic hypertrophy (BPH), neurogenic bladder, or other urological conditions     Activity - Up with nursing assistance     Glucose monitor nursing POCT    2 times daily before meals, can stop after a week or switch to daily / weekly checks if sugars in normal range     Physical Therapy Adult Consult    Evaluate and treat as clinically indicated.    Reason:  weakness     Occupational Therapy Adult Consult    Evaluate and treat as clinically indicated.    Reason:  weakness     Fall precautions     Diet    Follow this diet upon discharge: Orders Placed This Encounter      Calorie Counts      Room Service      Snacks/Supplements Adult: Other; Nepro w/ lunch; oragen jello @ 8 pm; With Meals      Combination Diet Easy to Chew (level 7); Thin Liquids (level 0); Thin Liquids (level 0) (one small sip at a time when alert and sitting fully upright. No straws. Alternate between bites/sips.)     Discharge Medications   Current  Discharge Medication List        START taking these medications    Details   B and C vitamin Complex with folic acid (NEPHRONEX) 0.9 MG/5ML LIQD liquid Take 5 mLs by mouth daily    Associated Diagnoses: S/P lumbar spinal fusion      hydrALAZINE (APRESOLINE) 10 MG tablet Take 1 tablet (10 mg) by mouth every 8 hours    Associated Diagnoses: Essential hypertension      hydrOXYzine (ATARAX) 10 MG tablet Take 1 tablet (10 mg) by mouth every 6 hours as needed for itching    Associated Diagnoses: S/P lumbar spinal fusion      melatonin 5 MG tablet Take 1 tablet (5 mg) by mouth nightly as needed for sleep    Associated Diagnoses: S/P lumbar spinal fusion      miconazole (MICATIN) 2 % external powder Apply topically 3 times daily as needed for other (under breast and ramona area)    Associated Diagnoses: Intertrigo      ondansetron (ZOFRAN ODT) 4 MG ODT tab Take 1 tablet (4 mg) by mouth every 6 hours as needed for nausea or vomiting    Associated Diagnoses: Acute kidney failure with tubular necrosis (H24)      oxyCODONE (ROXICODONE) 5 MG tablet Take 1 tablet (5 mg) by mouth every 6 hours as needed for severe pain  Qty: 10 tablet, Refills: 0    Associated Diagnoses: S/P lumbar spinal fusion      senna-docusate (SENOKOT-S/PERICOLACE) 8.6-50 MG tablet 1 tablet by Oral or NG Tube route 2 times daily as needed for constipation    Associated Diagnoses: S/P lumbar spinal fusion      traZODone (DESYREL) 50 MG tablet Take 0.5 tablets (25 mg) by mouth at bedtime    Associated Diagnoses: Insomnia, unspecified type           CONTINUE these medications which have CHANGED    Details   diltiazem ER COATED BEADS (CARDIZEM CD/CARTIA XT) 180 MG 24 hr capsule Take 1 capsule (180 mg) by mouth daily    Associated Diagnoses: Essential hypertension      pantoprazole (PROTONIX) 40 MG EC tablet Take 1 tablet (40 mg) by mouth daily    Associated Diagnoses: Gastroesophageal reflux disease without esophagitis      polyethylene glycol (MIRALAX) 17 g  packet Take 17 g by mouth daily Hold for loose stools    Associated Diagnoses: S/P lumbar spinal fusion           CONTINUE these medications which have NOT CHANGED    Details   acetaminophen (TYLENOL) 500 MG tablet Take 500-1,000 mg by mouth every 6 hours as needed for mild pain      levothyroxine (SYNTHROID/LEVOTHROID) 100 MCG tablet Take 1 tablet (100 mcg) by mouth daily  Qty: 90 tablet, Refills: 3    Associated Diagnoses: Hypothyroidism, unspecified type      pravastatin (PRAVACHOL) 40 MG tablet Take 1 tablet (40 mg) by mouth daily  Qty: 90 tablet, Refills: 3    Associated Diagnoses: Hyperlipidemia with target LDL less than 100      blood glucose (ACCU-CHEK GUIDE) test strip Use to test blood sugar 1 times daily or as directed.  Qty: 100 strip, Refills: 11    Comments: ### DO NOT FILL NOW.  Please update patient's profile to reflect additional refills.  ####  Associated Diagnoses: Diabetes mellitus with stage 3 chronic kidney disease (H)      blood glucose monitoring (ACCU-CHEK FASTCLIX) lancets USE TO TEST BLOOD SUGARS ONCE DAILY  Qty: 100 each, Refills: 11    Associated Diagnoses: Type 2 diabetes mellitus without complication, without long-term current use of insulin (H)      cholestyramine (QUESTRAN) 4 g packet DISSOLVE AND TAKE ONE PACKET BY MOUTH TWICE A DAY  Qty: 60 packet, Refills: 5    Associated Diagnoses: Hyperlipidemia with target LDL less than 100      Incontinence Supply Disposable (PROTECTIVE UNDERWEAR SUPER LG) MISC 3 each daily  Qty: 90 each, Refills: 11    Associated Diagnoses: OAB (overactive bladder)      order for DME Equipment being ordered: [employer] shoes and insoles  Qty: 2 Units, Refills: 1    Associated Diagnoses: Type 2 diabetes mellitus without complication, without long-term current use of insulin (H)           STOP taking these medications       lisinopril (ZESTRIL) 40 MG tablet Comments:   Reason for Stopping:         oxybutynin ER (DITROPAN XL) 10 MG 24 hr tablet Comments:   Reason for  Stopping:         potassium chloride ER (K-TAB/KLOR-CON) 10 MEQ CR tablet Comments:   Reason for Stopping:             Allergies   Allergies   Allergen Reactions    Fentanyl Nausea and Vomiting     VERY sensitive to most narcotics if not all.    Morphine And Related Nausea     Data   Most Recent 3 CBC's:  Recent Labs   Lab Test 11/28/23  0931 11/25/23  0630 11/24/23  0746 11/23/23  0718   WBC  --  7.0 6.1 5.7   HGB 9.3* 8.9* 8.7* 7.9*   MCV  --  92 94 92    258 264 239      Most Recent 3 BMP's:  Recent Labs   Lab Test 12/01/23  0804 11/30/23  1700 11/30/23  1351 11/30/23  1126 11/29/23  0830 11/29/23  0815 11/28/23  1941 11/28/23  1242 11/28/23  0931   NA  --   --   --  136  --  139  --   --  139   POTASSIUM  --   --   --  4.0  --  3.9 3.7  --  3.3*   CHLORIDE  --   --   --  103  --  106  --   --  103   CO2  --   --   --  19*  --  25  --   --  25   BUN  --   --   --  22.4  --  24.2*  --   --  25.1*   CR  --   --   --  2.15*  --  2.36*  --   --  2.50*   ANIONGAP  --   --   --  14  --  8  --   --  11   TERESA  --   --   --  8.9  --  9.1  --   --  9.1   * 123* 99 163*   < > 109*  --    < > 149*    < > = values in this interval not displayed.     Most Recent 2 LFT's:  Recent Labs   Lab Test 11/20/23  0955 11/13/23  0534   AST 29 27   ALT 17 15   ALKPHOS 127 97   BILITOTAL 0.4 0.3     Most Recent INR's and Anticoagulation Dosing History:  Anticoagulation Dose History          Latest Ref Rng & Units 10/25/2023 10/26/2023 10/27/2023   Recent Dosing and Labs   INR 0.85 - 1.15 1.73  2.11  1.62  1.58  1.57      Most Recent 3 Troponin's:  Recent Labs   Lab Test 03/23/18  2105 09/03/17  2035   TROPI 0.032 0.030     Most Recent Cholesterol Panel:  Recent Labs   Lab Test 11/26/23  1931 11/10/23  0705 05/02/23  1111   CHOL  --   --  162   LDL  --   --  69   HDL  --   --  44*   TRIG 146   < > 245*    < > = values in this interval not displayed.     Most Recent 6 Bacteria Isolates From Any Culture (See EPIC Reports for  Culture Details):  Recent Labs   Lab Test 03/08/19  0849 03/24/18  1815 09/10/17  0530 09/10/17  0528 09/10/17  0024 09/03/17  2119   CULT 50,000 to 100,000 colonies/mL  Escherichia coli  *  10,000 to 50,000 colonies/mL  mixed urogenital sylvia   No MRSA isolated No growth after 5 days No beta hemolytic Streptococcus Group A isolated  No growth   No growth after 5 days No growth after 6 days     Most Recent TSH, T4 and A1c Labs:  Recent Labs   Lab Test 10/31/23  0940 10/25/23  1629   TSH 0.17*  --    T4 0.64*  --    A1C  --  6.3*       Results for orders placed or performed during the hospital encounter of 10/25/23   XR Surgery PARKER Fluoro Less Than 5 Min w Stills    Narrative    SURGERY C-ARM FLUORO LESS THAN 5 MINUTES WITH STILLS  10/25/2023 3:05  PM     COMPARISON: None.    HISTORY: Intra-Op. T11-pelvis fusion.    NUMBER OF IMAGES ACQUIRED/VIEWS: Two spin CT acquisitions of the  thoracolumbar spine in the axial plane.    FLUOROSCOPY TIME: 0.7 minutes.      Impression    IMPRESSION: Correlation is made with CT lumbar spine 10/16/2023. Axial  spin CT acquisitions of the thoracolumbar spine were obtained during  the patient's operation for localization/operative navigation  purposes. No intraoperative consultation a radiologist was requested  for this exam. There is new posterior fusion instrumentation extending  from the lower thoracic region down to the bony pelvis region,  including screws extending to the bilateral sacroiliac joints. Sigmoid  curvature of the thoracolumbar spine, as seen on prior CT. Please see  the operative report from spine surgery for additional details and  real-time findings.    JORGE PATEL MD         SYSTEM ID:  [id number]   XR Abdomen Port 1 View    Narrative    ABDOMEN ONE VIEW PORTABLE  10/25/2023 4:02 PM     HISTORY: Instrument count.    COMPARISON: None.       Impression    IMPRESSION: Spine fusion changes. Overlying skin staples. Enteric tube  tip in left upper quadrant. Otherwise  no radiopaque foreign bodies.  Small amount of stool. Nonobstructed bowel gas pattern.    OJRGE GARCIA MD         SYSTEM ID:  [id number]   XR Chest Port 1 View    Narrative    EXAM: XR CHEST PORT 1 VIEW  LOCATION: [facility]  DATE: 10/25/2023    INDICATION: Endotracheal tube positioning and OG  COMPARISON: 11/10/2020      Impression    IMPRESSION: Endotracheal tube tip 4.9 cm above the deena. Right IJ central venous catheter with tip in the low SVC. Enteric tube with tip within the intrathoracic stomach. Retrocardiac opacity likely atelectasis. No pneumothorax. Stable   cardiomediastinal silhouette.   US Renal Complete Non-Vascular    Narrative    US RENAL COMPLETE NON-VASCULAR 10/26/2023 10:06 AM    CLINICAL HISTORY: LISA, oliguric.    TECHNIQUE: Routine Bilateral Renal and Bladder Ultrasound.    COMPARISON: CT abdomen and pelvis 1/12/2022. Renal ultrasound  1/24/2022.    FINDINGS:    RIGHT KIDNEY: 10.3 x 5.3 x 6.2 cm. Normal echogenicity without  hydronephrosis or masses. Lower pole echogenic structure without  convincing shadowing or twinkle artifact is likely artifactual versus  less likely nonobstructing stone.     LEFT KIDNEY: 10.3 x 6.0 x 6.3 cm. Normal echogenicity without  hydronephrosis or masses. Left inferior pole cyst measuring 0.9 cm; no  follow-up necessary.    BLADDER: Urinary bladder is decompressed by Spicer catheter.      Impression    IMPRESSION:  1.  No collecting system dilatation.    ANDREZ MORRIS MD         SYSTEM ID:  [id number]   US Abdomen Limited    Narrative    EXAM: US ABDOMEN LIMITED  LOCATION: [facility]  DATE: 10/27/2023    INDICATION: LIver enzymes elevated. Assess for billiary pathology.  COMPARISON: None.  TECHNIQUE: Limited abdominal ultrasound.    FINDINGS:    GALLBLADDER: Cholecystectomy. There is trace fluid in the gallbladder fossa.    BILE DUCTS: Common bile duct mildly prominent. The common duct measures 8  mm.    LIVER: Coarsened echotexture suggests parenchymal disease. No focal mass.    RIGHT KIDNEY: No hydronephrosis.    PANCREAS: The visualized portions are normal.    No ascites.      Impression    IMPRESSION:  1.  Cholecystectomy. Common bile duct prominent measuring 8 mm probably due to the postcholecystectomy state.    2.  Coarsening of the hepatic echotexture suggests parenchymal disease. Trace fluid in the gallbladder fossa.   CT Head w/o Contrast    Narrative    CT SCAN OF THE HEAD WITHOUT CONTRAST   10/27/2023 2:13 PM     HISTORY: recent hemorrhagic shock with multiorgan dysfunction. AMS    TECHNIQUE:  Axial images of the head and coronal reformations without  IV contrast material. Radiation dose for this scan was reduced using  automated exposure control, adjustment of the mA and/or kV according  to patient size, or iterative reconstruction technique.    COMPARISON: None.    FINDINGS: Moderate chronic small vessel ischemic changes. Mild global  cortical volume loss with expected dilatation of the ventricular  system. Intracranial atheromatous disease. Osseous calvarium is  intact.    The visualized portions of the sinuses and mastoids appear normal. The  bony calvarium and bones of the skull base appear intact.       Impression    IMPRESSION: No acute findings. Chronic changes.      YONATHAN SCHNEIDER MD         SYSTEM ID:  [id number]   XR Chest Port 1 View    Narrative    EXAM: XR CHEST PORT 1 VIEW  LOCATION: [facility]  DATE: 10/28/2023    INDICATION: CXR for ETT verification after advancing tube.  COMPARISON: 10/25/2023.    FINDINGS: ET tube 7 cm above the deena. Right IJ central venous catheter tip over the distal SVC. NG tube looped within a hiatal hernia. There is no pneumothorax. The heart size is normal. Increasing bibasilar infiltrates.      Impression    IMPRESSION: ET tube 7 cm above the deena.   XR Chest Port 1 View    Narrative    EXAM: XR CHEST PORT 1 VIEW  LOCATION:   [facility]  DATE: 10/29/2023    INDICATION: ETT positioning  COMPARISON: 10/20/2023      Impression    IMPRESSION: ET tube 5 cm from the deena. Enteric tube within the stomach with hiatal hernia. Lungs are clear. Right central line tip in the low SVC.   IR CVC Tunnel Placement > 5 Yrs of Age    Narrative    TUNNELED CENTRAL VENOUS CATHETER PLACEMENT 10/30/2023 11:32 AM    HISTORY:  Patient requires dialysis.    DESCRIPTION/FINDINGS: After obtaining informed consent, the patient  was placed in a supine position on the fluoroscopy table. The neck and  chest on the side of planned catheter placement were prepped and  draped in the usual sterile manner. 1% lidocaine was injected for  local anesthesia.    Ultrasound was used to evaluate and document patency of the right  internal jugular vein. Under sterile ultrasound guidance, a puncture  was made into the internal jugular vein. A permanent copy image was  obtained for the patient's records.     A peel-away sheath of appropriate size was placed. A small skin  incision was made in the anterior chest. A tunnel was created from the  incision to the IJ puncture site. The central venous catheter was  pulled through the tunnel and placed into the peel-away sheath. The  tip of the catheter was placed in the right atrium. The peel-away  sheath was peeled away.    The neck incision was closed with Dermabond. The catheter was sutured  to the patient's skin at the skin entry site. All ports were  aspirated, flushed with saline, and heparin locked.    A fluoroscopic image was saved to document tip of catheter in  satisfactory position.     The patient was independently monitored by a registered nurse assigned  to the Department of radiology using automated blood pressure, EKG and  pulse oximetry. The patient tolerated the procedure well. There were  no immediate postprocedure complications. The patient's vital signs  were monitored by radiology nursing  staff under my supervision and  remained stable throughout the study. Radiation dose for this scan was  reduced using automated exposure control, adjustment of the mA and/or  kV according to patient size, or iterative reconstruction technique.    MEDICATIONS: None    SEDATION TIME: None    FLUOROSCOPY TIME: 0.4 minutes    FLUOROSCOPIC DOSE: 2.77    NUMBER OF FLUOROSCOPIC IMAGES: 1      Impression    IMPRESSION:   14.5 Arabic tunneled central venous catheter placed.    Maximal Sterile Barrier Technique Utilized: Cap AND mask AND sterile  gown AND sterile gloves AND sterile full body drape AND hand hygiene  AND skin preparation 2% chlorhexidine for cutaneous antisepsis (or  acceptable alternative antiseptics).     Sterile Ultrasound Technique Utilized ?Sterile gel AND sterile probe  covers.    KALEB MICHEL MD         SYSTEM ID:  [id number]   XR Chest Port 1 View    Narrative    CHEST ONE VIEW October 31, 2023 12:05 PM     HISTORY: Coarse breath sounds. Visualize OG tube.    COMPARISON: None.      Impression    IMPRESSION: Orogastric tube tip is coiled projecting above the  diaphragm, this may be coiled in a hiatal hernia but this is not  certain. Large bore right IJ line noted with tip near the atriocaval  junction. Endotracheal tube tip 4.8 cm from the deena.    JORGE GARCIA MD         SYSTEM ID:  [id number]   CT Head w/o Contrast    Narrative    CT SCAN OF THE HEAD WITHOUT CONTRAST October 31, 2023 2:42 PM     HISTORY: Altered mental status.    TECHNIQUE: Axial images of the head and coronal reformations without  IV contrast material. Radiation dose for this scan was reduced using  automated exposure control, adjustment of the mA and/or kV according  to patient size, or iterative reconstruction technique.    COMPARISON: CT head 10/27/2023.    FINDINGS: There is no evidence of intracranial hemorrhage, mass, acute  infarct or anomaly. The ventricles are normal in size, shape and  configuration. Mild diffuse  parenchymal volume loss. Mild to moderate  extent of scattered patchy cerebral white matter hypodensities which  are nonspecific, but likely related to chronic microvascular ischemic  disease. Atherosclerotic calcifications involving the carotid siphons.    Bilateral lens implants. Mild mucosal thickening in the bilateral  ethmoid sinuses. Small amount of fluid/membrane thickening in the  right mastoid tip. The bony calvarium and bones of the skull base  appear intact.       Impression    IMPRESSION:   1. No CT findings of acute intracranial process.  2. Mild generalized brain parenchymal volume loss.  3. Mild to moderate patchy nonspecific white matter disease, which may  be due to chronic small vessel ischemic changes.    JORGE PATEL MD         SYSTEM ID:  [id number]   CT Chest/Abdomen/Pelvis w Contrast    Narrative    CT CHEST/ABDOMEN/PELVIS W CONTRAST 10/31/2023 2:46 PM    CLINICAL HISTORY: Hemorrhagic shock; suspected sepsis; ? source of  infection/bleeding    TECHNIQUE: CT scan of the chest, abdomen, and pelvis was performed  following injection of IV contrast. Multiplanar reformats were  obtained. Dose reduction techniques were used.   CONTRAST: 115 mL Isovue-370    COMPARISON: None.    FINDINGS:   LUNGS AND PLEURA: Small right effusion and trace left effusion with  bibasilar atelectasis. Small patchy airspace opacity is noted  bilaterally with more focal consolidation within the right upper lobe.    MEDIASTINUM/AXILLAE: Endotracheal tube noted in appropriate position.  Heart normal in size. No adenopathy. Enteric tube coiled within the  stomach. Moderate hiatal hernia. No filling defect to suggest a  pulmonary embolism.    HEPATOBILIARY: Liver within normal limits. Cholecystectomy.    PANCREAS: Normal.    SPLEEN: Normal.    ADRENAL GLANDS: Normal.    KIDNEYS/BLADDER: Tiny cyst within the interpolar region of the left  kidney which is benign and needs no further follow-up.    BOWEL: Diverticulosis in the  colon. No acute inflammatory change. No  obstruction.     PELVIC ORGANS: Bladder is collapsed. Fibroids are noted within the  uterus. Endometrium appears thickened measuring 1.5 cm.    ADDITIONAL FINDINGS: None.    MUSCULOSKELETAL: Status post surgical fixation of lower lumbar spine.  Degenerative changes of the spine.      Impression    IMPRESSION:  1.  Consolidation noted in the right upper lobe with bilateral patchy  airspace opacities concerning for pneumonia.  2.  Small right effusion and trace left effusion.  3.  No acute findings in the abdomen or pelvis. No abscess.  4.  Endometrium is thickened in a postmenopausal female. Recommend  nonemergent pelvic ultrasound.    DWAINE HEBERT MD         SYSTEM ID:  [id number]   MR Brain w/o & w Contrast    Narrative    EXAM: MR BRAIN W/O and W CONTRAST  LOCATION: [facility]  DATE: 10/31/2023    INDICATION: encephalopathy, right sided weakness, dysconjugate gaze  COMPARISON: CT head 10/31/2023  CONTRAST: 10 mL Gadavist  TECHNIQUE: Routine multiplanar multisequence head MRI without and with intravenous contrast.    FINDINGS:  INTRACRANIAL CONTENTS: 4 mm subtle focus of restricted diffusion in the left basal ganglia (series 603 image 20). Tiny associated foci of low gradient signal. Somewhat branching pattern suggests this could be due to a prominent vascular structure; less   likely due to tiny focus of acute/subacute ischemia with minimal associated hemorrhage. A branching vascular structures also suggested on prior CT. As a conservative measure, 6 hour CT follow-up recommended to ensure stability. Patchy and confluent   nonspecific T2/FLAIR hyperintensities within the cerebral white matter most consistent with moderate chronic microvascular ischemic change. Moderate generalized cerebral atrophy. No hydrocephalus. Normal position of the cerebellar tonsils. No pathologic   contrast enhancement.    SELLA: No abnormality accounting for  technique.    OSSEOUS STRUCTURES/SOFT TISSUES: Normal marrow signal. The major intracranial vascular flow voids are maintained.     ORBITS: No abnormality accounting for technique.     SINUSES/MASTOIDS: No paranasal sinus mucosal disease. Opacification mastoid air cells bilaterally.       Impression    IMPRESSION:  1.  4 mm subtle focus of restricted diffusion in the left basal ganglia. Tiny associated foci of low gradient signal. Somewhat branching pattern suggests this could be due to a prominent vascular structure; less likely due to tiny focus of acute/subacute   ischemia with minimal associated hemorrhage. A branching vascular structures also suggested on prior CT. As a conservative measure, 6 hour CT follow-up recommended to ensure stability.  2.  Moderate chronic small vessel ischemia.  3.  Moderate atrophy.     CT Head w/o Contrast    Narrative    CT SCAN OF THE HEAD WITHOUT CONTRAST   11/1/2023 2:23 PM     HISTORY: Evaluate for hemorrhage. Stroke    TECHNIQUE:  Axial images of the head and coronal reformations without  IV contrast material. Radiation dose for this scan was reduced using  automated exposure control, adjustment of the mA and/or kV according  to patient size, or iterative reconstruction technique.    COMPARISON: Head MRI 10/31/2023, head CT 10/31/2023      Impression    IMPRESSION: No evidence of hemorrhage. No change.    ALYSHA MAHAN MD         SYSTEM ID:  [id number]   XR Abdomen Port 1 View    Narrative    EXAM: XR ABDOMEN PORT 1 VIEW  LOCATION: [facility]  DATE: 11/2/2023    INDICATION: For feeding tube placement.  COMPARISON: None.      Impression    IMPRESSION: NG tube tip in the distal esophagus near the EG junction. Postoperative changes projecting over the midline of the abdomen with extensive postoperative changes of posterior amber fixation thoracolumbar spine. Bowel gas pattern is nonspecific.   Nothing for obstruction or free air. No evidence for renal  stones.   XR Video Swallow with SLP or OT    Narrative    VIDEO SWALLOWING EVALUATION   11/7/2023 3:17 PM     HISTORY: Dysphagia.    COMPARISON: None.    FLUOROSCOPY TIME: 2.4 minutes.  SPOT IMAGES OR CINE RUNS: 12      Impression    IMPRESSION:  Thin: With the teaspoon, no significant penetration observed. With the  cup, at least moderate penetration observed. With the straw, small  volume aspiration noted.    Mildly thick: Oropharyngeal phase delay noted. Deep penetration with  some laryngeal residue.    Moderately thick: No penetration. Moderate to large amount of  pharyngeal residue.    Pudding: Prolonged oral phase. No penetration. Mild/moderate  pharyngeal residue.    Semisolid: Not administered.    Solid: Not administered.    This study only includes the cervical esophagus. Please see separate  report from speech pathology for additional details.    JORGE PATEL MD         SYSTEM ID:  [id number]   XR Lumbar Spine 2/3 Views    Narrative    LUMBAR SPINE TWO TO THREE VIEWS  11/8/2023 8:15 AM     HISTORY: Postop thoracic/lumbar surgery.    COMPARISON: 10/16/2023 CT       Impression    IMPRESSION:    Diffuse osteopenia, which somewhat limits evaluation.    Interval [id number] posterior pedicle screw and amber fixation with  interbody disc grafts at L3-L4 and L4-L5 and bilateral sacroiliac  arthrodesis screws. Hardware appears intact without evidence of acute  hardware failure. Skin staples are present.    Similar extent of S-shaped thoracolumbar scoliosis. No significant  spondylolisthesis. Extensive lumbar spondylosis with multilevel disc  space height loss, osteophyte formation, and facet arthropathy.  Thoracic diffuse idiopathic skeletal hyperostosis.    Aortoiliac atherosclerotic calcifications. Prior cholecystectomy.  Barium is present throughout the colon.    THIEN MENDOZA MD         SYSTEM ID:  [id number]   XR Feeding Tube Placement    Narrative    FLUOROSCOPY GUIDED FEEDING TUBE PLACEMENT  11/9/2023 1:47  PM     HISTORY: Needs NGT placement, unable to tolerate bedside, hiatal  hernia. Feeding tube needed for nutrition.    COMPARISON: None.    TECHNIQUE: After injection of Xylocaine gel into the nose, a feeding  tube was advanced under fluoroscopic guidance.    FLUOROSCOPY TIME: 7.5 minutes.    SPOT FILMS: 1      Impression    IMPRESSION: The feeding tube is advanced with the tip in the portion  of the stomach above the diaphragm, the feeding tube could not be  advanced through the neck of the hiatal hernia.    JORGE GARCIA MD         SYSTEM ID:  [id number]   XR Abdomen Port 1 View    Narrative    ABDOMEN ONE VIEW PORTABLE  11/9/2023 3:47 PM     HISTORY: Check feeding tube advancement.    COMPARISON: November 2, 2023       Impression    IMPRESSION: Feeding tube remains coiled in the upper portion of the  stomach in the hiatal hernia.    JROGE GARCIA MD         SYSTEM ID:  [id number]   US Lower Extremity Venous Duplex Right    Narrative    EXAM: US LOWER EXTREMITY VENOUS DUPLEX RIGHT  LOCATION: [facility]  DATE: 11/10/2023    INDICATION: Thigh pain, swelling, rule out DVT.  COMPARISON: None.  TECHNIQUE: Venous Duplex ultrasound of the right lower extremity with and without compression, augmentation and duplex. Color flow and spectral Doppler with waveform analysis performed.    FINDINGS: Exam includes the common femoral, femoral, popliteal, and contralateral common femoral veins as well as segmentally visualized deep calf veins and greater saphenous vein.     RIGHT: No deep vein thrombosis. No superficial thrombophlebitis. No popliteal cyst.      Impression    IMPRESSION:  1.  No deep venous thrombosis in the right lower extremity.   XR Video Swallow with SLP or OT    Narrative    VIDEO SWALLOWING EVALUATION   11/15/2023 9:17 AM     HISTORY: further assess oropharyngeal swallow function    COMPARISON: Video swallow study: 11/7/2023.    FLUOROSCOPY TIME: 1.8 minutes.  SPOT IMAGES OR CINE RUNS:  16      Impression    IMPRESSION:      Thin: Multiple episodes of laryngeal penetration, reaching cord level  on occasion. No nito aspiration. Premature spillage. Mild pharyngeal  residue.    Slightly thick: Transient laryngeal penetration without nito  aspiration.    Mildly thick: Transient laryngeal penetration intermittently. No nito  aspiration. Premature spillage. Mild pharyngeal residue.    Moderately thick: No laryngeal penetration or aspiration. Premature  spillage. Mild pharyngeal residue.    Puree: No laryngeal penetration or aspiration. Mild pharyngeal  residue.    Semi-Solid: No laryngeal penetration or aspiration. Mild pharyngeal  residue.    Regular: Not administered.    This study only includes the cervical esophagus.    THIERNO SHIELDS MD         SYSTEM ID:  [id number]   IR CVC Tunnel Removal Right    [facility] RADIOLOGY    EXAM: TUNNELED DIALYSIS CATHETER REMOVAL    LOCATION: [facility]    CLINICAL HISTORY: 83-year-old female with recovering renal function  who no longer needs the dialysis catheter presents for catheter  removal.    PROCEDURES PERFORMED:  1. Catheter removal with gentle traction and blunt dissection.    MODERATE SEDATION: None    ADDITIONAL MEDICATIONS: None     CONTRAST: None    FLUOROSCOPIC TIME: None   CUMULATIVE AIR KERMA/DOSE:  None    STERILE BARRIER TECHNIQUE: Maximal Sterile Barrier Technique Utilized:  Cap AND mask AND sterile gown AND sterile gloves AND sterile full body  drape AND hand hygiene AND skin preparation 2% chlorhexidine for  cutaneous antisepsis (or acceptable alternative antiseptics).    Sterile Ultrasound Technique Utilized ?Sterile gel AND sterile probe  covers.    UNIVERSAL PROTOCOL: Standard universal protocol per facility  guidelines was followed. See EMR for documentation.     TECHNIQUE: The right neck was prepped and draped in the usual, sterile  fashion. One percent lidocaine was utilized for local anesthesia.  The  cuff of the  catheter was dissected free from the surrounding soft  tissues.  The catheter was removed with gentle traction. Pressure was  applied until hemostasis was obtained and the site was covered with a  sterile dressing. The patient tolerated the procedure well without any  immediate complications.     FINDINGS:  The catheter was removed in its entirety, including the polyester  cuff.   The skin insertion site is clean and dry.      Impression    IMPRESSION:  1. Removal of tunneled catheter from the internal jugular vein.                CARLA YUN MD         SYSTEM ID:  [id number]   Echocardiogram Complete     Value    LVEF  65-70%    [facility]    [id number]  [id number]  [id number]  [id number]^[email or web address]^ALEX^TIFFANIE     [facility]  Echocardiography Laboratory  [address] [place] [address]     Name: GLADYS MCINTYRE  MRN: [id number]  : 1940  Study Date: 10/26/2023 07:18 AM  Age: 83 yrs  Gender: Female  Patient Location: [facility]  Reason For Study: Shock  Ordering Physician: ALEX JUAREZ  Referring Physician: Kisha Stinson  Performed By: Cash Rodriguez RDCS     BSA: 2.1 m2  Height: 65 in  Weight: 225 lb  HR: 116  BP: 101/51 mmHg  ______________________________________________________________________________  Procedure  Complete Portable Echo Adult. Optison (NDC #[id number]) given intravenously.  ______________________________________________________________________________  Interpretation Summary     1. The left ventricle is normal in size. The visual ejection fraction is 65-  70%.  2. The right ventricle is normal in structure, function and size.  3. No valve disease.  4. IVC not well visualized, probably small/collapsed.     No changes from echo 10/2022.  ______________________________________________________________________________  Left Ventricle  The left ventricle is normal in size. There is mild to moderate concentric  left ventricular hypertrophy. The visual ejection  fraction is 65-70%. Left  ventricular diastolic function is indeterminate. Normal left ventricular wall  motion.     Right Ventricle  The right ventricle is normal in structure, function and size.     Atria  Normal left atrial size. Right atrial size is normal. There is no atrial shunt  seen.     Mitral Valve  The mitral valve is normal in structure and function.     Tricuspid Valve  The tricuspid valve is normal in structure and function.     Aortic Valve  The aortic valve is normal in structure and function.     Pulmonic Valve  The pulmonic valve is normal in structure and function.     Vessels  Normal ascending, transverse (arch), and descending aorta. IVC not well  visualized, probably small/collapsed.     Pericardium  There is no pericardial effusion.     Rhythm  The rhythm was sinus tachycardia.  ______________________________________________________________________________  MMode/2D Measurements & Calculations  IVSd: 1.5 cm     LVIDd: 3.5 cm  LVIDs: 2.3 cm  LVPWd: 1.3 cm  FS: 34.8 %  LV mass(C)d: 168.7 grams  LV mass(C)dI: 81.1 grams/m2  Ao root diam: 3.2 cm  LA dimension: 3.0 cm  asc Aorta Diam: 3.5 cm  LA/Ao: 0.93  Ao root diam index Ht(cm/m): 1.9  Ao root diam index BSA (cm/m2): 1.5  Asc Ao diam index BSA (cm/m2): 1.7  Asc Ao diam index Ht(cm/m): 2.1  RWT: 0.72     Doppler Measurements & Calculations  MV E max jimena: 75.7 cm/sec  MV A max jimena: 117.4 cm/sec  MV E/A: 0.64  MV max P.3 mmHg  MV mean P.9 mmHg  MV V2 VTI: 23.7 cm  MV dec slope: 337.1 cm/sec2  MV dec time: 0.22 sec     PA acc time: 0.08 sec  E/E' av.2  Lateral E/e': 10.5  Medial E/e': 17.8     ______________________________________________________________________________  Report approved by: Yaneil Woodson 10/26/2023 09:10          '

## 2023-12-01 NOTE — PLAN OF CARE
Occupational Therapy Discharge Summary    Reason for therapy discharge:    Discharged to transitional care facility.    Progress towards therapy goal(s). See goals on Care Plan in [facility] electronic health record for goal details.  Goals not met.  Barriers to achieving goals:   discharge from facility.    Therapy recommendation(s):    Continued therapy is recommended.  Rationale/Recommendations:  Skilled OT in ARU to address safety and IND in I/ADLs. If unable to attend ARU, then TCU should be considered.

## 2023-12-01 NOTE — DISCHARGE SUMMARY
Shift Summary [id number]      Admitting Diagnosis: Scoliosis of lumbar spine, unspecified scoliosis type. S/P lumbar spinal fusion.     Patient A&Ox4. VSS, on RA. BS check x3 daily with meals, no insulin. PIV SL. On Combined easy to chew diet. T/R. Mepilex on the coccyx for redness. Pt declined stool softener, but did have a large loose stool.  Up x1-2 GB/W, TLSO brace when OOB was up in the chair. Sykes in place for retention, will discharging with sykes and follow up with nephrology per notes.  Pt discharging to [facility] on W/C. AVS printed via [facility], included oxycodone prescription sheet. Belongings returned to Pt.

## 2023-12-01 NOTE — PLAN OF CARE
Physical Therapy Discharge Summary    Reason for therapy discharge:    Discharged to transitional care facility.    Progress towards therapy goal(s). See goals on Care Plan in [facility] electronic health record for goal details.  Goals partially met.  Barriers to achieving goals:   discharge from facility.    Therapy recommendation(s):    Continued therapy is recommended.  Rationale/Recommendations:  recommend continued PT at TCU to address functional mobility deficits.

## 2023-12-01 NOTE — PLAN OF CARE
Goal Outcome Evaluation:         Summary :11/30/23. [id number]  Patient is alert and oriented X4. VSS on RA with good sat. Up with assist of 1-2 with gait belt and walker, LSO brace on when patient out of bed.  Patient tolerates PT well. On combined easy to chew diet with thin liquids with no straws. Patient tolerates  diet  well.  On daily weight  checks. CMS intact. Dressing dry and intact and open to air. Continent of bowel, sykes in place with moderate output. Awaiting TCU placement

## 2023-12-01 NOTE — PLAN OF CARE
Goal Outcome Evaluation:    Summary: 11/30-12/1/2023; [id number]  Diagnosis: lumbar spinal fusion  POD: 36  Orientation: A/O x4  Vitals/Tele: mildly hypertensive on RA  IV Access/drains: R PIV SL  Diet: Easy to chew, thin liquids  Mobility: A2 GBW  Pain: denies  GI/: sykes catheter, no BM this shift  Wound/Skin: scattered bruising, blancheable redness of coccyx, and scattered scabs  Consults: nephrology and neurosurgery following  Discharge Plan: pending placement for bed availability

## 2023-12-04 ENCOUNTER — LAB REQUISITION (OUTPATIENT)
Dept: LAB | Facility: CLINIC | Age: 83
End: 2023-12-04

## 2023-12-04 ENCOUNTER — PATIENT OUTREACH (OUTPATIENT)
Dept: CARE COORDINATION | Facility: CLINIC | Age: 83
End: 2023-12-04
Payer: COMMERCIAL

## 2023-12-04 DIAGNOSIS — Z11.1 ENCOUNTER FOR SCREENING FOR RESPIRATORY TUBERCULOSIS: ICD-10-CM

## 2023-12-04 PROCEDURE — 36415 COLL VENOUS BLD VENIPUNCTURE: CPT | Performed by: FAMILY MEDICINE

## 2023-12-04 PROCEDURE — 86481 TB AG RESPONSE T-CELL SUSP: CPT | Performed by: FAMILY MEDICINE

## 2023-12-04 PROCEDURE — P9604 ONE-WAY ALLOW PRORATED TRIP: HCPCS | Performed by: FAMILY MEDICINE

## 2023-12-04 NOTE — LETTER
[facility]   To:             Please give to facility    From:   Susy Key  RN  Care Coordinator   [facility]   P: [phone]  [email or web address]   Patient Name:  Mya Hui YOB: 1940   Admit date: 12/1/2023      *Information Needed:  Please contact me when the patient will discharge (or if they will move to long term care)- include the discharge date, disposition, and main diagnosis   If the patient is discharged with home care services, please provide the name of the agency    Also- Please inform me if a care conference is being held.   Phone, Fax or Email with information                        Thank you

## 2023-12-04 NOTE — PROGRESS NOTES
Clinic Care Coordination Contact  Care Coordination Transition Communication    Clinical Data: Patient was hospitalized at [facility] from 10/25/2023 to 12/1/2023 with diagnosis of Scoliosis.     Assessment: Patient has transitioned to TCU/ARU for short term rehabilitation:    Facility Name: [facility] (Main Phone: [phone] Admissions Phone: [phone] Fax: [phone])    Transition Communication:  Notified facility of Primary Care- Care Coordination support via Epic fax.    Plan: Care Coordinator will await notification from facility staff informing of patient's discharge plans/needs. Care Coordinator will review chart and outreach to facility staff every 4 weeks and as needed.     Susy Key RN Care Coordination   [facility]  Email: [email or web address]  Phone: [phone]

## 2023-12-05 LAB
GAMMA INTERFERON BACKGROUND BLD IA-ACNC: 0.1 IU/ML
M TB IFN-G BLD-IMP: POSITIVE
M TB IFN-G CD4+ BCKGRND COR BLD-ACNC: 9.9 IU/ML
MITOGEN IGNF BCKGRD COR BLD-ACNC: 4.92 IU/ML
MITOGEN IGNF BCKGRD COR BLD-ACNC: 5 IU/ML
QUANTIFERON MITOGEN: 10 IU/ML
QUANTIFERON NIL TUBE: 0.1 IU/ML
QUANTIFERON TB1 TUBE: 5.1 IU/ML
QUANTIFERON TB2 TUBE: 5.02

## 2023-12-07 ENCOUNTER — LAB REQUISITION (OUTPATIENT)
Dept: LAB | Facility: CLINIC | Age: 83
End: 2023-12-07

## 2023-12-07 DIAGNOSIS — Z48.811 ENCOUNTER FOR SURGICAL AFTERCARE FOLLOWING SURGERY ON THE NERVOUS SYSTEM: ICD-10-CM

## 2023-12-08 LAB
ANION GAP SERPL CALCULATED.3IONS-SCNC: 11 MMOL/L (ref 7–15)
BUN SERPL-MCNC: 17.8 MG/DL (ref 8–23)
CALCIUM SERPL-MCNC: 8.6 MG/DL (ref 8.8–10.2)
CHLORIDE SERPL-SCNC: 112 MMOL/L (ref 98–107)
CREAT SERPL-MCNC: 1.58 MG/DL (ref 0.51–0.95)
DEPRECATED HCO3 PLAS-SCNC: 17 MMOL/L (ref 22–29)
EGFRCR SERPLBLD CKD-EPI 2021: 33 ML/MIN/1.73M2
ERYTHROCYTE [DISTWIDTH] IN BLOOD BY AUTOMATED COUNT: 15.4 % (ref 10–15)
GLUCOSE SERPL-MCNC: 92 MG/DL (ref 70–99)
HCT VFR BLD AUTO: 27.4 % (ref 35–47)
HGB BLD-MCNC: 8.4 G/DL (ref 11.7–15.7)
MCH RBC QN AUTO: 28.4 PG (ref 26.5–33)
MCHC RBC AUTO-ENTMCNC: 30.7 G/DL (ref 31.5–36.5)
MCV RBC AUTO: 93 FL (ref 78–100)
PLATELET # BLD AUTO: 194 10E3/UL (ref 150–450)
POTASSIUM SERPL-SCNC: 4 MMOL/L (ref 3.4–5.3)
RBC # BLD AUTO: 2.96 10E6/UL (ref 3.8–5.2)
SODIUM SERPL-SCNC: 140 MMOL/L (ref 135–145)
WBC # BLD AUTO: 6.4 10E3/UL (ref 4–11)

## 2023-12-08 PROCEDURE — 82374 ASSAY BLOOD CARBON DIOXIDE: CPT | Performed by: NURSE PRACTITIONER

## 2023-12-08 PROCEDURE — 36415 COLL VENOUS BLD VENIPUNCTURE: CPT | Performed by: NURSE PRACTITIONER

## 2023-12-08 PROCEDURE — 82435 ASSAY OF BLOOD CHLORIDE: CPT | Performed by: NURSE PRACTITIONER

## 2023-12-08 PROCEDURE — 85027 COMPLETE CBC AUTOMATED: CPT | Performed by: NURSE PRACTITIONER

## 2023-12-08 PROCEDURE — P9604 ONE-WAY ALLOW PRORATED TRIP: HCPCS | Performed by: NURSE PRACTITIONER

## 2023-12-08 PROCEDURE — 82947 ASSAY GLUCOSE BLOOD QUANT: CPT | Performed by: NURSE PRACTITIONER

## 2023-12-13 ENCOUNTER — TELEPHONE (OUTPATIENT)
Dept: NEUROSURGERY | Facility: CLINIC | Age: 83
End: 2023-12-13
Payer: COMMERCIAL

## 2023-12-13 NOTE — TELEPHONE ENCOUNTER
Rhina Gregory NP will call with xr results. No need to schedule follow-up appt as patient was seen inpatient close to 6 week post-op.    Called patient's daughter to update. April voiced agreement and understanding.

## 2023-12-13 NOTE — TELEPHONE ENCOUNTER
[facility] Call Center    Phone Message    May a detailed message be left on voicemail: yes     Reason for Call: Patient asking if She Need a Follow up With Doctor. Please call Daughter.     Action Taken:     PH NEUROSURGERY       Travel Screening: Not Applicable

## 2023-12-13 NOTE — TELEPHONE ENCOUNTER
Patient is 7 weeks s/p Thoracic 11 to pelvis robotic decompression and fusion on 10/25/23 with Dr. Waite.    11/30 patient seen while inpatient so 12/1 6 week appt was cancelled due to pt being in the hospital.    Xr scheduled 12/18.     Next follow-up scheduled 2/8/24 (12 week appt).    Called patient's daughter to discuss follow-up appts. Daughter states xr is scheduled for 12/18 but no follow-up appt is scheduled at this time. She is wondering if pt needs to be scheduled for a follow-up. Encounter routed to provider team for review and scheduling recommendations.     Call patient's daughter, April, with any scheduling needs: [phone].

## 2023-12-18 ENCOUNTER — DOCUMENTATION ONLY (OUTPATIENT)
Dept: OTHER | Facility: CLINIC | Age: 83
End: 2023-12-18
Payer: COMMERCIAL

## 2023-12-18 ENCOUNTER — HOSPITAL ENCOUNTER (OUTPATIENT)
Dept: GENERAL RADIOLOGY | Facility: CLINIC | Age: 83
Discharge: HOME | End: 2023-12-18
Attending: NURSE PRACTITIONER | Admitting: NURSE PRACTITIONER
Payer: COMMERCIAL

## 2023-12-18 DIAGNOSIS — Z98.1 S/P LUMBAR FUSION: ICD-10-CM

## 2023-12-18 PROCEDURE — 72080 X-RAY EXAM THORACOLMB 2/> VW: CPT

## 2023-12-19 ENCOUNTER — TELEPHONE (OUTPATIENT)
Dept: NEUROSURGERY | Facility: CLINIC | Age: 83
End: 2023-12-19
Payer: COMMERCIAL

## 2023-12-19 NOTE — TELEPHONE ENCOUNTER
Patient's thoracic XR is stable. Should continue with current cares and follow up in clinic for 12 week appointment.     Attempted to reach out to patient, no answer. Left voice message for them to call clinic back to further discuss.

## 2023-12-21 ENCOUNTER — TELEPHONE (OUTPATIENT)
Dept: NEUROSURGERY | Facility: CLINIC | Age: 83
End: 2023-12-21
Payer: COMMERCIAL

## 2023-12-21 NOTE — TELEPHONE ENCOUNTER
Per chart review, xr stable. Patient should continue current cares and follow-up at 12 weeks post-op.     Called patient's daughter to update. April states patient has been wearing brace as instructed, wear when out of bed.     April would like to clarify if patient can work on lower extremity dressing with PT/OT which would include sitting at edge of bed and bending without brace on.     Question routed to provider team for review and recommendations.

## 2023-12-21 NOTE — TELEPHONE ENCOUNTER
Per provider team patient should avoid bending and twisting until follow up.     Called patient's daughter to update. April voiced agreement and understanding.

## 2023-12-21 NOTE — TELEPHONE ENCOUNTER
Reason for Call:  Other appointment    Detailed comments: April calling regarding the last xrays that were done on 12/18 and what those results are. Please call [phone]     Phone Number Patient can be reached at: Home number on file [phone] (home)    Best Time:  any    Can we leave a detailed message on this number? YES    Call taken on 12/21/2023 at 3:02 PM by Mansi Cordova

## 2023-12-26 ENCOUNTER — TRANSFERRED RECORDS (OUTPATIENT)
Dept: HEALTH INFORMATION MANAGEMENT | Facility: CLINIC | Age: 83
End: 2023-12-26
Payer: COMMERCIAL

## 2023-12-28 ENCOUNTER — MEDICAL CORRESPONDENCE (OUTPATIENT)
Dept: HEALTH INFORMATION MANAGEMENT | Facility: CLINIC | Age: 83
End: 2023-12-28
Payer: COMMERCIAL

## 2024-01-03 ENCOUNTER — TELEPHONE (OUTPATIENT)
Dept: FAMILY MEDICINE | Facility: OTHER | Age: 84
End: 2024-01-03
Payer: COMMERCIAL

## 2024-01-03 NOTE — TELEPHONE ENCOUNTER
Form faxed to [phone] and sent to scanning.  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   Called daughter Chanell and scheduled on 1/16.

## 2024-01-03 NOTE — TELEPHONE ENCOUNTER
Ok/d though note that she likely needs to be seen within 30 days to cover start of homecare. Asked to schedule - note on form.  Form in my ma slot.  Mansi Santamaria MD

## 2024-01-03 NOTE — TELEPHONE ENCOUNTER
INCOMING FORMS    Sender: feroz    Type of Form, letter or note (What is requested?): order    How was the form received?: Fax    How should forms be returned?:  Fax : [phone]    Form placed in OOO bin for review/signature if appropriate.

## 2024-01-08 ENCOUNTER — PATIENT OUTREACH (OUTPATIENT)
Dept: CARE COORDINATION | Facility: CLINIC | Age: 84
End: 2024-01-08
Payer: COMMERCIAL

## 2024-01-08 NOTE — PROGRESS NOTES
Clinic Care Coordination Contact    Situation: Patient chart reviewed by care coordinator.    Background: Patient in identified status. She was discharged to TCU.     Assessment: RN CC called TCU. She said the patient should be discharged sometime this week.     Plan/Recommendations: RN CC will schedule outreach for next week and follow up with patient once discharged.     Susy Key RN Care Coordination   [facility]  Email: [email or web address]  Phone: [phone]

## 2024-01-09 ENCOUNTER — MEDICAL CORRESPONDENCE (OUTPATIENT)
Dept: HEALTH INFORMATION MANAGEMENT | Facility: CLINIC | Age: 84
End: 2024-01-09

## 2024-01-10 DIAGNOSIS — I10 ESSENTIAL HYPERTENSION: ICD-10-CM

## 2024-01-10 RX ORDER — HYDRALAZINE HYDROCHLORIDE 10 MG/1
10 TABLET, FILM COATED ORAL EVERY 8 HOURS
Status: CANCELLED | OUTPATIENT
Start: 2024-01-10

## 2024-01-10 NOTE — TELEPHONE ENCOUNTER
Per RN with [facility], this was never sent home with patient after being discharged from SNF.

## 2024-01-12 ENCOUNTER — TELEPHONE (OUTPATIENT)
Dept: FAMILY MEDICINE | Facility: OTHER | Age: 84
End: 2024-01-12
Payer: COMMERCIAL

## 2024-01-12 ENCOUNTER — MEDICAL CORRESPONDENCE (OUTPATIENT)
Dept: HEALTH INFORMATION MANAGEMENT | Facility: CLINIC | Age: 84
End: 2024-01-12
Payer: COMMERCIAL

## 2024-01-12 DIAGNOSIS — I10 ESSENTIAL HYPERTENSION: ICD-10-CM

## 2024-01-12 RX ORDER — HYDRALAZINE HYDROCHLORIDE 10 MG/1
10 TABLET, FILM COATED ORAL EVERY 8 HOURS
Qty: 90 TABLET | Refills: 0 | Status: ON HOLD | OUTPATIENT
Start: 2024-01-12 | End: 2024-02-06

## 2024-01-12 NOTE — TELEPHONE ENCOUNTER
INCOMING FORMS    Sender: feroz     Type of Form, letter or note (What is requested?): order    How was the form received?: Fax    How should forms be returned?:  Fax : [phone]    Form placed in OOO bin for review/signature if appropriate.

## 2024-01-15 ENCOUNTER — PATIENT OUTREACH (OUTPATIENT)
Dept: CARE COORDINATION | Facility: CLINIC | Age: 84
End: 2024-01-15
Payer: COMMERCIAL

## 2024-01-15 NOTE — PROGRESS NOTES
Clinic Care Coordination Contact    OUTREACH    Referral Information:  Referral Source: IP Handoff    Chief Complaint   Patient presents with    Clinic Care Coordination - Homecare/TCU     RN CC- TCU chart review        Universal Utilization: 75.3% Admission or ED Risk     Utilization      No Show Count (past year)  3             ED Visits  1             Hospital Admissions  4                    Current as of: 1/15/2024  7:16 AM                Clinical Concerns:  Current Medical Concerns:  RN CC called and spoke to the patient. She had a recently hospital stay and was discharged to the TCU. Patient is now home. She said she got home sometime last week. Patient reports things have been going OK. She said she has home care coming in to help her right now. She is hoping they come today or tomorrow to set up her medications. RN CC offered to get patient home care's number but patient declined. Patient said her daughter is helpful for her. She has a follow up in clinic tomorrow. Patient can't think of any needs at this time.       Current Behavioral Concerns: None noted.      Education Provided to patient: RN CC advised patient and/or caregiver to call Primary Care Provider's office with any urgent questions or concerns. Advised patient and/or care giver to call the pharmacy for any refill requests needed. Advised patient and/or care giver to call RN CC with any non urgent needs or concerns.      Health Maintenance Reviewed: Due/Overdue   Health Maintenance Due   Topic Date Due    COVID-19 Vaccine (7 - 2023-24 season) 11/27/2023    PHQ-2 (once per calendar year)  01/01/2024       Clinical Pathway: None    Medication Management:  Medication review status: Medications reviewed and no changes reported per patient.           Functional Status:       Living Situation:       Lifestyle & Psychosocial Needs:    Social Determinants of Health     Food Insecurity: Low Risk  (10/19/2023)    Food Insecurity     Within the past 12  months, did you worry that your food would run out before you got money to buy more?: No     Within the past 12 months, did the food you bought just not last and you didn t have money to get more?: No   Depression: Not at risk (5/2/2023)    PHQ-2     PHQ-2 Score: 0   Housing Stability: Low Risk  (10/19/2023)    Housing Stability     Do you have housing? : Yes     Are you worried about losing your housing?: No   Tobacco Use: Low Risk  (10/25/2023)    Patient History     Smoking Tobacco Use: Never     Smokeless Tobacco Use: Never     Passive Exposure: Not on file   Financial Resource Strain: Low Risk  (10/19/2023)    Financial Resource Strain     Within the past 12 months, have you or your family members you live with been unable to get utilities (heat, electricity) when it was really needed?: No   Alcohol Use: Not on file   Transportation Needs: Low Risk  (10/19/2023)    Transportation Needs     Within the past 12 months, has lack of transportation kept you from medical appointments, getting your medicines, non-medical meetings or appointments, work, or from getting things that you need?: No   Physical Activity: Not on file   Interpersonal Safety: Low Risk  (10/19/2023)    Interpersonal Safety     Do you feel physically and emotionally safe where you currently live?: Yes     Within the past 12 months, have you been hit, slapped, kicked or otherwise physically hurt by someone?: No     Within the past 12 months, have you been humiliated or emotionally abused in other ways by your partner or ex-partner?: No   Stress: Not on file   Social Connections: Not on file        Resources and Interventions:  Current Resources:   Skilled Home Care Services: Skilled Nursing  Community Resources: Home Care     Equipment Currently Used at Home: walker, standard  Employment Status: retired         Advance Care Plan/Directive  Advanced Care Plans/Directives on file:: Yes  Status of record:: On File and Validated  Type Advanced Care  Plans/Directives: Advanced Directive - On File    Referrals Placed: None         Care Plan:      Patient/Caregiver understanding: Patient/caregiver verbalized understanding and denies any additional questions or concerns at this time. RNCC engaged in [employer]ID[employer] communications during encounter.      Future Appointments                Tomorrow Fareed Roldan PA-C [facility]    In 3 weeks Rhina Gregory NP [facility] Neurosurgery [facility]    In 4 weeks Reuben Baez MD [facility]            Plan: Patient declines care coordination at this time. She will reach out if she needs something in the future. She has the clinic's number.     Susy Key RN Care Coordination   [facility]  Email: [email or web address]  Phone: [phone]

## 2024-01-16 ENCOUNTER — TELEPHONE (OUTPATIENT)
Dept: FAMILY MEDICINE | Facility: OTHER | Age: 84
End: 2024-01-16

## 2024-01-16 ENCOUNTER — OFFICE VISIT (OUTPATIENT)
Dept: FAMILY MEDICINE | Facility: OTHER | Age: 84
End: 2024-01-16
Payer: COMMERCIAL

## 2024-01-16 VITALS
BODY MASS INDEX: 33.99 KG/M2 | RESPIRATION RATE: 16 BRPM | WEIGHT: 204 LBS | TEMPERATURE: 98.4 F | SYSTOLIC BLOOD PRESSURE: 138 MMHG | OXYGEN SATURATION: 99 % | DIASTOLIC BLOOD PRESSURE: 62 MMHG | HEIGHT: 65 IN | HEART RATE: 69 BPM

## 2024-01-16 DIAGNOSIS — N32.81 OAB (OVERACTIVE BLADDER): ICD-10-CM

## 2024-01-16 DIAGNOSIS — E11.51 DIABETES MELLITUS WITH PERIPHERAL VASCULAR DISEASE (H): ICD-10-CM

## 2024-01-16 DIAGNOSIS — R60.0 PERIPHERAL EDEMA: ICD-10-CM

## 2024-01-16 DIAGNOSIS — R45.89 DEPRESSED MOOD: ICD-10-CM

## 2024-01-16 DIAGNOSIS — K21.9 GASTROESOPHAGEAL REFLUX DISEASE WITHOUT ESOPHAGITIS: ICD-10-CM

## 2024-01-16 DIAGNOSIS — E78.5 HYPERLIPIDEMIA WITH TARGET LDL LESS THAN 100: ICD-10-CM

## 2024-01-16 DIAGNOSIS — N17.9 ACUTE KIDNEY INJURY (H): ICD-10-CM

## 2024-01-16 DIAGNOSIS — H91.92 HEARING LOSS OF LEFT EAR, UNSPECIFIED HEARING LOSS TYPE: ICD-10-CM

## 2024-01-16 DIAGNOSIS — N18.32 STAGE 3B CHRONIC KIDNEY DISEASE (H): ICD-10-CM

## 2024-01-16 DIAGNOSIS — I10 ESSENTIAL HYPERTENSION: Primary | ICD-10-CM

## 2024-01-16 DIAGNOSIS — E03.9 HYPOTHYROIDISM, UNSPECIFIED TYPE: ICD-10-CM

## 2024-01-16 PROBLEM — E66.01 MORBID OBESITY (H): Status: RESOLVED | Noted: 2022-08-14 | Resolved: 2024-01-16

## 2024-01-16 PROCEDURE — 99214 OFFICE O/P EST MOD 30 MIN: CPT | Performed by: PHYSICIAN ASSISTANT

## 2024-01-16 RX ORDER — OXYBUTYNIN CHLORIDE 5 MG/1
5 TABLET, EXTENDED RELEASE ORAL DAILY
Qty: 30 TABLET | Refills: 0 | Status: SHIPPED | OUTPATIENT
Start: 2024-01-16 | End: 2024-02-26

## 2024-01-16 RX ORDER — LEVOTHYROXINE SODIUM 100 UG/1
100 TABLET ORAL DAILY
Qty: 90 TABLET | Refills: 3 | Status: SHIPPED | OUTPATIENT
Start: 2024-01-16

## 2024-01-16 RX ORDER — PANTOPRAZOLE SODIUM 40 MG/1
40 TABLET, DELAYED RELEASE ORAL DAILY
Qty: 30 TABLET | Refills: 1 | Status: SHIPPED | OUTPATIENT
Start: 2024-01-16 | End: 2024-05-06

## 2024-01-16 RX ORDER — DILTIAZEM HYDROCHLORIDE 180 MG/1
180 CAPSULE, COATED, EXTENDED RELEASE ORAL DAILY
Qty: 30 CAPSULE | Refills: 1 | Status: SHIPPED | OUTPATIENT
Start: 2024-01-16 | End: 2024-02-26

## 2024-01-16 RX ORDER — PRAVASTATIN SODIUM 40 MG
40 TABLET ORAL DAILY
Qty: 90 TABLET | Refills: 3 | Status: SHIPPED | OUTPATIENT
Start: 2024-01-16 | End: 2024-05-22

## 2024-01-16 NOTE — LETTER
January 16, 2024      Mya Hui  [address]   [place] [address]        To Whom It May Concern,       Mya Hui is under my care through the [facility]. She was recently hospitalized following an elective T11 to pelvis robotic decompression and fusion with subsequent decompensation of her health. Since discharge it has been determined that she is unable to live independently at this time.       Sincerely,        Fareed Roldan PA-C

## 2024-01-16 NOTE — TELEPHONE ENCOUNTER
INCOMING FORMS    Sender: Lali    Type of Form, letter or note (What is requested?): Orders    How was the form received?: Fax    How should forms be returned?:  Fax : [phone]    Form placed in OOO bin for review/signature if appropriate.

## 2024-01-16 NOTE — PROGRESS NOTES
Assessment & Plan     1. Essential hypertension    2. Diabetes mellitus with peripheral vascular disease (H)    3. Stage 3b chronic kidney disease (H)    4. Acute kidney injury (H24)    5. Peripheral edema    6. Hypothyroidism, unspecified type    7. Hyperlipidemia with target LDL less than 100    8. Hearing loss of left ear, unspecified hearing loss type    9. OAB (overactive bladder)    10. Gastroesophageal reflux disease without esophagitis    11. Depressed mood      BP is 138/62 today. Patient's renal function is reduced, S3CKD. She is managing BP with diltiazem and hydralazine. However, daughter informs me that she has not started the hydralazine yet. As she is overdue for follow up with nephrologist following discharge and BP is acceptable I will have her hold this medication and monitor BP. If BP begins to raise then can look at starting it.   Patient's last A1c was checked in October 2023, 6.3%, and does not need recheck until April 2024.  During hospitalization patient had severe LISA requiring hemodialysis. Nephrology involved in patient's care, discharge summary noted creatinine at baseline on discharge. She was instructed on follow up with nephrologist as outpatient which she was not aware of. Review of post discharge labs from 12/08/23 which showed creatinine at 1.58, GFR 33 and CrCl of 41. Referral placed for her to schedule with specialist.  See above.   Patient is in back brace due to T11 to pelvic decompression and fusion surgery. As such her movements are restricted. Specifically, this makes it difficult to elevate her feet resulting in worsening dependent edema. Feet are mildly to moderate edematous on exam today. She says that this is somewhat improved each morning, but worsens throughout the day. Patient's daughter informs me that they tried compression stockings, but these were too difficult to get on for the patient. Given the recent severe LISA I am hesitant to begin diuretics. Instead I will  [facility] [address] [address]          May 5, 2022    RE:  Armando Marie                                                                                                                                                       [address] [place] [address]            To whom it may concern:    Armando Marie is under my professional care for illness.  She  may return to work with no restrictions her next available shift.    Sincerely,        Adonis Borrero PA-C     send out lower strength compression stockings for the patient to use. I will have patient put these on each morning when edema is at its lowest. If this does not adequately control the symptoms can refer patient to edema PT program.   Most recent check was in October 2023, grossly normal. Patient's thyroid remained stable throughout hospital stay. No changes recommended at this time.   Last checked in May 2022, grossly normal. Currently taking pravastatin. Per CrCl of 41, no dosage adjustment necessary. Will have her continue without change.  Patient's L hearing aid has been malfunctioning. Daughter is working to connect with audiologist to get this repaired. New referral placed today. Patient understands that they may want or need to see her prior to repair.  Patient has been off of her ditropan during the hospital stay due to LISA. She has not restarted this since discharge. Without the medication she has been experiencing urge to urinate frequently throughout the day and at night. This has been disrupting her sleep, but is also causing more stress on her back as she is having to get up from bed throughout the night. Reviewed dosing guidelines for kidney disease. I will start patient on 5mg dose. She will monitor for persistent symptoms. Repeat renal panel in 2-4 weeks after starting.   Patient has been using PPI for management of her reflux. No dosage adjustment necessary for current renal function.   Patient has been struggling with the loss of her independence. She had been living independently prior to surgery and hospitalization and is now living in an assisted living facility. Patient became tearful at this and at the frustration of not being able to do all the things she would like to do. I did discuss counseling vs ssri therapy. She is not interested in either at this time. Will continue to monitor at future visits.     - diltiazem ER COATED BEADS (CARDIZEM CD/CARTIA XT) 180 MG 24 hr capsule; Take 1 capsule  "(180 mg) by mouth daily  - Compression Sleeve/Stocking Order for DME - ONLY FOR DME  - levothyroxine (SYNTHROID/LEVOTHROID) 100 MCG tablet; Take 1 tablet (100 mcg) by mouth daily  - pravastatin (PRAVACHOL) 40 MG tablet; Take 1 tablet (40 mg) by mouth daily  - Adult Nephrology  Referral; Future  - oxyBUTYnin ER (DITROPAN XL) 5 MG 24 hr tablet; Take 1 tablet (5 mg) by mouth daily    - Adult Audiology  Referral; Future  - Compression Sleeve/Stocking Order for DME - ONLY FOR DME  - pantoprazole (PROTONIX) 40 MG EC tablet; Take 1 tablet (40 mg) by mouth daily      MED REC REQUIRED  Post Medication Reconciliation Status:  Discharge medications reconciled, continue medications without change  BMI  Estimated body mass index is 33.95 kg/m  as calculated from the following:    Height as of this encounter: 1.651 m (5' 5\").    Weight as of this encounter: 92.5 kg (204 lb).   Weight management plan: Discussed healthy diet and exercise guidelines        Wan Roque is a 83 year old, presenting for the following health issues:  Swelling in legs and feet (X2  months) and Clinic Care Coordination - Face To Face (Since started home care with [facility])      1/16/2024     2:12 PM   Additional Questions   Roomed by Maya TORRES   Accompanied by Daughter-April     Discharge Summary  Hospitalist     Date of Admission:  10/25/2023  Date of Discharge:  12/1/2023  Discharging Provider: Vanessa Rome MD, MD  Date of Service (when I saw the patient): 12/01/23    Hospital Course  Mya Hui is a pleasant 83 year old woman with history of scoliosis, lumbar radiculopathy, HTN, dyslipidemia, DM type 2, hypothyroidism, CKD stage 3, morbid obesity, GERD, sliding hiatal hernia, overactive bladder, OA, chronic right shoulder pain/rotator cuff arthropathy, migraines, and asymptomatic carotid artery stenosis - who was admitted 10/25/2023 for an elective T11 to pelvis robotic decompression and fusion.     Surgery was " complicated by development of arrhythmia with significant hypotension and noted 3.5 L blood loss per Op note and Anesthesia post-procedure evaluation note.  She received IV fluids, 6 units of pRBCs and 2 units of FFP intra-op.  ST changes were noted intraoperatively.  Patient remained intubated and was started on Levophed and then transferred to the ICU due to hemorrhagic shock.  She required vasopressor support with Levophed until 10/27.   Transferred out of the ICU with Hospitalist co-managing since 11/03.  Current problems include:        Scoliosis  Lumbar radiculopathy  S/p T11 to pelvis robotic decompression and fusion on 10/25  Acute blood loss anemia with post-op hemorrhagic shock, resolved  Shock liver - resolved  Type 2 MI due to hemorrhagic shock, improved  Physical deconditioning, multifactorial  - Neurosurgery managing.  - continue PT/OT; recommending ARU.   - SW involved in finding placement at TCU or ARU.   - oxycodone PRN- using occasionally  - wear brace when out of bed    - routine wound care   -> follow up with Neurosurgery clinic at 6 weeks post-op with Xray prior     Encephalopathy, multifactorial; resolved  Left basal ganglia restricted diffusion (incidental finding)  Per prior: was noted to be encephalopathic for which Vascular Neurology was consulted.   * Brain MRI was completed and noted a left basal ganglia small focus of restricted diffusion but not a cause for encephalopathy and felt it was likely multifactorial.    * EEG monitor was completed and negative and subsequently stopped.    * suspect due to uremic encephalopathy and sepsis.  * Encephalopathy improved 11/6.  Continue delirium prevention:  - reorient patient at each interaction.  - keep room lights on and blinds open during day (8am-8pm), low lights 8pm-8am.  - minimize interruptions of sleep at night (consolidate vitals, nursing assessments, medications).  - avoid sedating medications as able.    - Vascular Neurology recommended  repeat brain MRI in 2-3 months.     Severe LISA secondary to ATN S/p hemodialysis with subsequent renal recovery (Last HD  11/17)  Anion gap metabolic acidosis due to above  CKD stage 3a  Hypoalbuminemia  Anasarca  Baseline creatinine between 1.1-1.4 with GFR in the 40-50's.   Due to development of LISA, Nephrology was consulted and tunneled dialysis catheter placed 10/30 and underwent HD Mon, Wed, Friday  - last hemodialysis was 11/17.  - had continued drop in creatinine with good urine output.  No more dialysis needed.  Hemodialysis catheter removed 11/24. Creatinine 2.15 on 11/30.(Peak at 8.3 on 10/30)  - Sykes placed for urinary retention.  - continue Sykes until more mobile. Plan to discharge with sykes.  -Follow-up with nephrology clinic as outpatient.  Ordered follow-up labs in a week.      Overactive bladder  Urinary retention; new since surgery  - holding PTA Ditropan XL 10 mg/d.  - Sykes catheter placed 11/21/23 due to urinary retention.  Can do trial of void at TCU when she is more mobile.     Acute blood loss anemia   Hb stable ~8 since her initial hemorrhage. 11/17- Hb lower at 7.6   - EPO  per Nephrology  - Hgb now 8-9 range.  - monitor with intermittent labs.     Sepsis due to aspiration/ventilator associated pneumonia (Enterobacter, stenotrophomonas and E. Coli)  Acute hypoxic respiratory failure; resolved  Leukocytosis due to above  Per prior: was extubated 11/2  Also developed aspiration/ventilator associated pneumonia with sputum Cx growing Enterobacter, stenotrophomonas and E. coli and was initially started on Zoysn and switched to Meropenem on 11/1/2023.  - earlier was on Meropenem (? stopped 11/5)   - completed 14 day course of antibiotics 11/14.  - encourage use of IS/Flutter valve.       Dysphagia - improving  Moderate malnutrition  Failed SLP assessment.   Bedside feeding tube placement coiling due to hiatal hernia.  started PPN 11/10/23, discontinued on 11/18/23.  dysphagia diet per  speech  RD consult appreciated     Constipation, with incontinence to stool following laxative use  due to decreased mobility unable to make it to bathroom.  Patient is understandably distressed by this.  Continue senna and MiraLAX to ensure daily bowel movements but hold for frequent loose stools.  had a loose stool on 11/27; continue bowel regimen with hold parameters.     Hypertension  NSVT  continue diltiazem  mg po daily and hydralazine 10 mg po q8h.  holding PTA lisinopril for ATN.     Dyslipidemia  holding PTA pravastatin 40 mg/d to reduce pill burden.  Resumed at discharge     Type 2 DM, controlled; 10/25 A1c = 6.3%  Stress induced hyperglycemia  Blood sugars have been very well-controlled, without insulin   given recent rise will continue to follow blood sugars 2 times daily before meals      Hypothyroidism  continue PTA synthroid.     GERD  Sliding hiatal hernia  continue PTA PPI.     OA  Chronic right shoulder pain/rotator cuff arthropathy  Chronic Pain  pain/analgesic management per Neurosurgery.  As needed oxycodone prescribed at discharge-use sparingly.        Obesity   Suspected ANAYELI  Body mass index is 37.64 kg/m .  - follow up with PCP regarding ongoing management.    - monitor O2 saturations.    - needs outpatient sleep study.       Migraines; no recent recurrence  - continue PRN analgesics      Asymptomatic carotid artery stenosis  - follow up with PCP     Hypomagnesemia   Hypokalemia  - continue monitoring and RN managed protocol.        Patient is discharged to TCU in stable condition     Vanessa Rome MD, MD    Patient identified using two patient identifiers.  Ear exam showing wax occlusion completed by provider.  H202/H20 was placed in the bilateral ear(s) via irrigation tool: elephant ear. Had to stop in after almost using a full bottle on both ears, patient was c/o pain and wants provider to look again to see if he can remove anymore. Maya Bush CMA.    Review of Systems  "  Constitutional, HEENT, cardiovascular, pulmonary, gi and gu systems are negative, except as otherwise noted.      Objective    /62   Pulse 69   Temp 98.4  F (36.9  C) (Temporal)   Resp 16   Ht 1.651 m (5' 5\")   Wt 92.5 kg (204 lb)   LMP  (LMP Unknown)   SpO2 99%   BMI 33.95 kg/m    Body mass index is 33.95 kg/m .  Physical Exam   GENERAL: alert and no distress  EYES: Eyes grossly normal to inspection, PERRL and conjunctivae and sclerae normal  HENT: normal cephalic/atraumatic, both ears: occluded with wax, nose and mouth without ulcers or lesions, oropharynx clear, and oral mucous membranes moist  NECK: no adenopathy, no asymmetry, masses, or scars  RESP: lungs clear to auscultation - no rales, rhonchi or wheezes  CV: regular rate and rhythm, normal S1 S2, no S3 or S4, no murmur, click or rub, no peripheral edema  MS: mild/mod edema to feet bilaterally  PSYCH: mentation appears normal, affect normal/bright    Lab Requisition on 12/08/2023   Component Date Value Ref Range Status    WBC Count 12/08/2023 6.4  4.0 - 11.0 10e3/uL Final    RBC Count 12/08/2023 2.96 (L)  3.80 - 5.20 10e6/uL Final    Hemoglobin 12/08/2023 8.4 (L)  11.7 - 15.7 g/dL Final    Hematocrit 12/08/2023 27.4 (L)  35.0 - 47.0 % Final    MCV 12/08/2023 93  78 - 100 fL Final    MCH 12/08/2023 28.4  26.5 - 33.0 pg Final    MCHC 12/08/2023 30.7 (L)  31.5 - 36.5 g/dL Final    RDW 12/08/2023 15.4 (H)  10.0 - 15.0 % Final    Platelet Count 12/08/2023 194  150 - 450 10e3/uL Final    Sodium 12/08/2023 140  135 - 145 mmol/L Final    Reference intervals for this test were updated on 09/26/2023 to more accurately reflect our healthy population. There may be differences in the flagging of prior results with similar values performed with this method. Interpretation of those prior results can be made in the context of the updated reference intervals.     Potassium 12/08/2023 4.0  3.4 - 5.3 mmol/L Final    Chloride 12/08/2023 112 (H)  98 - 107 mmol/L " Final    Carbon Dioxide (CO2) 12/08/2023 17 (L)  22 - 29 mmol/L Final    Anion Gap 12/08/2023 11  7 - 15 mmol/L Final    Urea Nitrogen 12/08/2023 17.8  8.0 - 23.0 mg/dL Final    Creatinine 12/08/2023 1.58 (H)  0.51 - 0.95 mg/dL Final    GFR Estimate 12/08/2023 33 (L)  >60 mL/min/1.73m2 Final    Calcium 12/08/2023 8.6 (L)  8.8 - 10.2 mg/dL Final    Glucose 12/08/2023 92  70 - 99 mg/dL Final

## 2024-01-17 ENCOUNTER — TELEPHONE (OUTPATIENT)
Dept: NEPHROLOGY | Facility: CLINIC | Age: 84
End: 2024-01-17
Payer: COMMERCIAL

## 2024-01-17 ENCOUNTER — TELEPHONE (OUTPATIENT)
Dept: FAMILY MEDICINE | Facility: OTHER | Age: 84
End: 2024-01-17
Payer: COMMERCIAL

## 2024-01-17 DIAGNOSIS — N18.30 CKD (CHRONIC KIDNEY DISEASE) STAGE 3, GFR 30-59 ML/MIN (H): Primary | ICD-10-CM

## 2024-01-17 NOTE — TELEPHONE ENCOUNTER
Please fax DME from 01/16/24 to:  [email or web address]  [facility] [place]  [address] [place] [address], [place] [address]   ~35.1 mi  [phone]    Please notify patient when faxed so that they can coordinate .    Fareed Roldan PA-C on 1/17/2024 at 3:13 PM

## 2024-01-17 NOTE — TELEPHONE ENCOUNTER
Attempted to schedule New pt appt per referral, unable to reach at this time, lvm with available date/time and writer's direct line for call back.  Pat Jones,  Nephrology Clinic Navigator  [facility]  Direct: [phone].       Incoming call from daughter (April). April declined offered appt at [facility], prefers clinic in [facility] area. Routing to  clinic for further assistance.  Pat Jones,  Nephrology Clinic Navigator  [facility]  Direct: [phone].

## 2024-01-19 NOTE — TELEPHONE ENCOUNTER
Attempted to reach April to discuss appointment offer on hold for patient.   Will attempt to reach pt dtr again to discuss.  Virtual appointment on hold for patient with Dr. Kilpatrick 2/6/24 at 11:00 AM.

## 2024-01-24 ENCOUNTER — TELEPHONE (OUTPATIENT)
Dept: FAMILY MEDICINE | Facility: OTHER | Age: 84
End: 2024-01-24
Payer: COMMERCIAL

## 2024-01-24 NOTE — TELEPHONE ENCOUNTER
Head,  normocephalic,  atraumatic,  Face,  Face within normal limits,  Ears,  External ears within normal limits,  Nose/Nasopharynx,  External nose  normal appearance, Mouth and Throat,  Breath odor normal,  Lips,  Appearance normal INCOMING FORMS    Sender: Lali    Type of Form, letter or note (What is requested?): Orders    How was the form received?: Fax    How should forms be returned?:  Fax : [phone]    Form faxed to [facility] via [employer].

## 2024-01-25 ENCOUNTER — TELEPHONE (OUTPATIENT)
Dept: FAMILY MEDICINE | Facility: OTHER | Age: 84
End: 2024-01-25

## 2024-01-25 ENCOUNTER — MEDICAL CORRESPONDENCE (OUTPATIENT)
Dept: HEALTH INFORMATION MANAGEMENT | Facility: CLINIC | Age: 84
End: 2024-01-25

## 2024-01-25 NOTE — TELEPHONE ENCOUNTER
Form completed and faxed back     Carlos Stinson PA-C  [facility]

## 2024-01-25 NOTE — TELEPHONE ENCOUNTER
There are 2 on this patient. I only got the [employer] one    Faxed back    Carlos Torres-SAILAJA Orr  [facility]

## 2024-01-25 NOTE — TELEPHONE ENCOUNTER
2 forms on this patient. I only got one for guardian [employer]vin. I did not get this form    Carlos Torres-SAILAJA Orr  [facility]

## 2024-01-25 NOTE — TELEPHONE ENCOUNTER
Spoke to April, pts daughter. They agreed to video visit. Writer feels labs prior to visit would be appropriate. Orders placed and will review with patient's home care agency.

## 2024-01-25 NOTE — TELEPHONE ENCOUNTER
If the oxybutynin is not working as expected, the patient should plan on reviewing the concerns with the nephrologist as this was the plan discussed at the visit. I see that the nephrologist reached out to the patient with a held appointment time on 01/17/24 for appointment on 02/06/24. Please confirm that the patient scheduled this.     Fareed Roldan PA-C on 1/25/2024 at 10:23 AM

## 2024-01-25 NOTE — TELEPHONE ENCOUNTER
Notified daughter April . Asked about nephrology  appt, she said she will reach out to schedule and she did not get any phone calls. Gave [facility] number.

## 2024-01-25 NOTE — TELEPHONE ENCOUNTER
Signed DME order placed in MA task box to be re-faxed.    Fareed Roldan PA-C on 1/25/2024 at 10:20 AM

## 2024-01-25 NOTE — TELEPHONE ENCOUNTER
Per patient and daughter, Oxybutin not helping so far. How long should she continue trying medication?     Daughter spoke with [facility] and DME order for compression stockings needing provider signature. Can you sign a copy to be refaxed?

## 2024-01-25 NOTE — TELEPHONE ENCOUNTER
INCOMING FORMS    Sender: [employer]    Type of Form, letter or note (What is requested?): Order for TEDS    How was the form received?: Fax    How should forms be returned?:  Fax : [phone]    [facility] . Fax back to  fax [phone]

## 2024-01-29 ENCOUNTER — TELEPHONE (OUTPATIENT)
Dept: NEPHROLOGY | Facility: CLINIC | Age: 84
End: 2024-01-29
Payer: COMMERCIAL

## 2024-01-29 NOTE — TELEPHONE ENCOUNTER
Contacted [facility]. They are able to complete lab draw prior to visit. Fax number is [phone]. Home care team to fax med list and BP readings to clinic.     Orders printed and faxed to [phone].

## 2024-01-29 NOTE — TELEPHONE ENCOUNTER
Received via Fax from [facility]:  Pt's Medication list and vital signs report.  Will routed to Dr. Kilpatrick for review.  Original copy sent to [facility] to be scanned to chart.  Pt is scheduled for a Video Visit with Dr. Kilpatrick 2/06/24 at 11:00am.    Vanessa Sosa LPN

## 2024-01-30 ENCOUNTER — TELEPHONE (OUTPATIENT)
Dept: NEPHROLOGY | Facility: CLINIC | Age: 84
End: 2024-01-30
Payer: COMMERCIAL

## 2024-01-30 ENCOUNTER — TRANSFERRED RECORDS (OUTPATIENT)
Dept: HEALTH INFORMATION MANAGEMENT | Facility: CLINIC | Age: 84
End: 2024-01-30
Payer: COMMERCIAL

## 2024-01-30 DIAGNOSIS — Z98.1 S/P LUMBAR SPINAL FUSION: Primary | ICD-10-CM

## 2024-01-30 NOTE — TELEPHONE ENCOUNTER
[facility] Call [facility]    Phone Message    May a detailed message be left on voicemail: no     Reason for Call: Other: pt daughter calling, they couldn't get urine sample do to restrictions from back surgery, needs to know if this is needed, please advise     Action Taken: Other: neph    Travel Screening: Not Applicable

## 2024-01-30 NOTE — TELEPHONE ENCOUNTER
"Called and spoke with pts Daughter \"April\".  Pt lives at Assisted living with [facility].  She will try to have them collect a urine sample using a \"hat\".  Informed April if she is unable to collect that it is ok and we can update Dr. Kilpatrick.    Vanessa Sosa, SHILA    "

## 2024-02-01 ENCOUNTER — TELEPHONE (OUTPATIENT)
Dept: FAMILY MEDICINE | Facility: OTHER | Age: 84
End: 2024-02-01
Payer: COMMERCIAL

## 2024-02-01 NOTE — TELEPHONE ENCOUNTER
INCOMING FORMS    Sender: Lali     Type of Form, letter or note (What is requested?): order    How was the form received?: Fax    How should forms be returned?:  Fax : [phone]    Form placed in JR bin for review/signature if appropriate.

## 2024-02-02 ENCOUNTER — MEDICAL CORRESPONDENCE (OUTPATIENT)
Dept: HEALTH INFORMATION MANAGEMENT | Facility: CLINIC | Age: 84
End: 2024-02-02
Payer: COMMERCIAL

## 2024-02-04 ENCOUNTER — APPOINTMENT (OUTPATIENT)
Dept: GENERAL RADIOLOGY | Facility: CLINIC | Age: 84
End: 2024-02-04
Attending: STUDENT IN AN ORGANIZED HEALTH CARE EDUCATION/TRAINING PROGRAM
Payer: COMMERCIAL

## 2024-02-04 ENCOUNTER — TELEPHONE (OUTPATIENT)
Dept: NEUROSURGERY | Facility: CLINIC | Age: 84
End: 2024-02-04

## 2024-02-04 ENCOUNTER — HOSPITAL ENCOUNTER (OUTPATIENT)
Facility: CLINIC | Age: 84
Setting detail: OBSERVATION
Discharge: HOME | End: 2024-02-06
Attending: STUDENT IN AN ORGANIZED HEALTH CARE EDUCATION/TRAINING PROGRAM | Admitting: INTERNAL MEDICINE
Payer: COMMERCIAL

## 2024-02-04 ENCOUNTER — APPOINTMENT (OUTPATIENT)
Dept: CT IMAGING | Facility: CLINIC | Age: 84
End: 2024-02-04
Attending: STUDENT IN AN ORGANIZED HEALTH CARE EDUCATION/TRAINING PROGRAM
Payer: COMMERCIAL

## 2024-02-04 DIAGNOSIS — M54.50 ACUTE MIDLINE LOW BACK PAIN, UNSPECIFIED WHETHER SCIATICA PRESENT: ICD-10-CM

## 2024-02-04 DIAGNOSIS — N39.0 ACUTE LOWER UTI: ICD-10-CM
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
## 2024-02-04 DIAGNOSIS — M54.50 ACUTE LOW BACK PAIN, UNSPECIFIED BACK PAIN LATERALITY, UNSPECIFIED WHETHER SCIATICA PRESENT: ICD-10-CM

## 2024-02-04 DIAGNOSIS — M54.50 ACUTE RIGHT-SIDED LOW BACK PAIN WITHOUT SCIATICA: ICD-10-CM

## 2024-02-04 DIAGNOSIS — S32.010A CLOSED COMPRESSION FRACTURE OF BODY OF L1 VERTEBRA (H): ICD-10-CM

## 2024-02-04 DIAGNOSIS — D64.9 CHRONIC ANEMIA: ICD-10-CM

## 2024-02-04 DIAGNOSIS — J96.01 ACUTE RESPIRATORY FAILURE WITH HYPOXIA (H): ICD-10-CM

## 2024-02-04 LAB
ALBUMIN SERPL BCG-MCNC: 4.1 G/DL (ref 3.5–5.2)
ALBUMIN UR-MCNC: NEGATIVE MG/DL
ALP SERPL-CCNC: 92 U/L (ref 40–150)
ALT SERPL W P-5'-P-CCNC: 10 U/L (ref 0–50)
ANION GAP SERPL CALCULATED.3IONS-SCNC: 11 MMOL/L (ref 7–15)
APPEARANCE UR: CLEAR
AST SERPL W P-5'-P-CCNC: 17 U/L (ref 0–45)
BACTERIA #/AREA URNS HPF: ABNORMAL /HPF
BASOPHILS # BLD AUTO: 0.1 10E3/UL (ref 0–0.2)
BASOPHILS NFR BLD AUTO: 1 %
BILIRUB SERPL-MCNC: 0.3 MG/DL
BILIRUB UR QL STRIP: NEGATIVE
BUN SERPL-MCNC: 18.8 MG/DL (ref 8–23)
CALCIUM SERPL-MCNC: 9.2 MG/DL (ref 8.8–10.2)
CHLORIDE SERPL-SCNC: 107 MMOL/L (ref 98–107)
COLOR UR AUTO: ABNORMAL
CREAT SERPL-MCNC: 1.35 MG/DL (ref 0.51–0.95)
CRP SERPL-MCNC: <3 MG/L
DEPRECATED HCO3 PLAS-SCNC: 22 MMOL/L (ref 22–29)
EGFRCR SERPLBLD CKD-EPI 2021: 39 ML/MIN/1.73M2
EOSINOPHIL # BLD AUTO: 0.2 10E3/UL (ref 0–0.7)
EOSINOPHIL NFR BLD AUTO: 3 %
ERYTHROCYTE [DISTWIDTH] IN BLOOD BY AUTOMATED COUNT: 13.9 % (ref 10–15)
ERYTHROCYTE [SEDIMENTATION RATE] IN BLOOD BY WESTERGREN METHOD: 16 MM/HR (ref 0–30)
GLUCOSE BLDC GLUCOMTR-MCNC: 188 MG/DL (ref 70–99)
GLUCOSE SERPL-MCNC: 109 MG/DL (ref 70–99)
GLUCOSE UR STRIP-MCNC: NEGATIVE MG/DL
HCT VFR BLD AUTO: 30.5 % (ref 35–47)
HGB BLD-MCNC: 9.3 G/DL (ref 11.7–15.7)
HGB UR QL STRIP: NEGATIVE
IMM GRANULOCYTES # BLD: 0 10E3/UL
IMM GRANULOCYTES NFR BLD: 0 %
KETONES UR STRIP-MCNC: NEGATIVE MG/DL
LEUKOCYTE ESTERASE UR QL STRIP: ABNORMAL
LYMPHOCYTES # BLD AUTO: 1.6 10E3/UL (ref 0.8–5.3)
LYMPHOCYTES NFR BLD AUTO: 24 %
MCH RBC QN AUTO: 26.8 PG (ref 26.5–33)
MCHC RBC AUTO-ENTMCNC: 30.5 G/DL (ref 31.5–36.5)
MCV RBC AUTO: 88 FL (ref 78–100)
MONOCYTES # BLD AUTO: 0.8 10E3/UL (ref 0–1.3)
MONOCYTES NFR BLD AUTO: 11 %
NEUTROPHILS # BLD AUTO: 4 10E3/UL (ref 1.6–8.3)
NEUTROPHILS NFR BLD AUTO: 61 %
NITRATE UR QL: NEGATIVE
NRBC # BLD AUTO: 0 10E3/UL
NRBC BLD AUTO-RTO: 0 /100
PH UR STRIP: 5 [PH] (ref 5–7)
PLATELET # BLD AUTO: 241 10E3/UL (ref 150–450)
POTASSIUM SERPL-SCNC: 4.1 MMOL/L (ref 3.4–5.3)
PROT SERPL-MCNC: 7 G/DL (ref 6.4–8.3)
RBC # BLD AUTO: 3.47 10E6/UL (ref 3.8–5.2)
RBC URINE: <1 /HPF
SODIUM SERPL-SCNC: 140 MMOL/L (ref 135–145)
SP GR UR STRIP: 1.01 (ref 1–1.03)
SQUAMOUS EPITHELIAL: 2 /HPF
UROBILINOGEN UR STRIP-MCNC: NORMAL MG/DL
WBC # BLD AUTO: 6.6 10E3/UL (ref 4–11)
WBC URINE: 8 /HPF

## 2024-02-04 PROCEDURE — 86140 C-REACTIVE PROTEIN: CPT | Performed by: STUDENT IN AN ORGANIZED HEALTH CARE EDUCATION/TRAINING PROGRAM

## 2024-02-04 PROCEDURE — 99223 1ST HOSP IP/OBS HIGH 75: CPT | Mod: VID | Performed by: INTERNAL MEDICINE

## 2024-02-04 PROCEDURE — G0378 HOSPITAL OBSERVATION PER HR: HCPCS

## 2024-02-04 PROCEDURE — 72133 CT LUMBAR SPINE W/O & W/DYE: CPT

## 2024-02-04 PROCEDURE — 96376 TX/PRO/DX INJ SAME DRUG ADON: CPT | Performed by: STUDENT IN AN ORGANIZED HEALTH CARE EDUCATION/TRAINING PROGRAM

## 2024-02-04 PROCEDURE — 96375 TX/PRO/DX INJ NEW DRUG ADDON: CPT | Performed by: STUDENT IN AN ORGANIZED HEALTH CARE EDUCATION/TRAINING PROGRAM

## 2024-02-04 PROCEDURE — 250N000011 HC RX IP 250 OP 636: Performed by: STUDENT IN AN ORGANIZED HEALTH CARE EDUCATION/TRAINING PROGRAM

## 2024-02-04 PROCEDURE — 99285 EMERGENCY DEPT VISIT HI MDM: CPT | Mod: 25 | Performed by: STUDENT IN AN ORGANIZED HEALTH CARE EDUCATION/TRAINING PROGRAM

## 2024-02-04 PROCEDURE — 82962 GLUCOSE BLOOD TEST: CPT

## 2024-02-04 PROCEDURE — 99285 EMERGENCY DEPT VISIT HI MDM: CPT | Performed by: STUDENT IN AN ORGANIZED HEALTH CARE EDUCATION/TRAINING PROGRAM

## 2024-02-04 PROCEDURE — 72080 X-RAY EXAM THORACOLMB 2/> VW: CPT

## 2024-02-04 PROCEDURE — 96374 THER/PROPH/DIAG INJ IV PUSH: CPT | Performed by: STUDENT IN AN ORGANIZED HEALTH CARE EDUCATION/TRAINING PROGRAM

## 2024-02-04 PROCEDURE — 80053 COMPREHEN METABOLIC PANEL: CPT | Performed by: STUDENT IN AN ORGANIZED HEALTH CARE EDUCATION/TRAINING PROGRAM

## 2024-02-04 PROCEDURE — 36415 COLL VENOUS BLD VENIPUNCTURE: CPT | Performed by: STUDENT IN AN ORGANIZED HEALTH CARE EDUCATION/TRAINING PROGRAM

## 2024-02-04 PROCEDURE — 87186 SC STD MICRODIL/AGAR DIL: CPT | Performed by: STUDENT IN AN ORGANIZED HEALTH CARE EDUCATION/TRAINING PROGRAM

## 2024-02-04 PROCEDURE — 250N000009 HC RX 250: Performed by: STUDENT IN AN ORGANIZED HEALTH CARE EDUCATION/TRAINING PROGRAM

## 2024-02-04 PROCEDURE — 99207 PR NOT IN PERSON INPATIENT CONSULT STATISTICAL MARKER: CPT | Performed by: INTERNAL MEDICINE

## 2024-02-04 PROCEDURE — 81001 URINALYSIS AUTO W/SCOPE: CPT | Performed by: STUDENT IN AN ORGANIZED HEALTH CARE EDUCATION/TRAINING PROGRAM

## 2024-02-04 PROCEDURE — 85652 RBC SED RATE AUTOMATED: CPT | Performed by: STUDENT IN AN ORGANIZED HEALTH CARE EDUCATION/TRAINING PROGRAM

## 2024-02-04 PROCEDURE — 85025 COMPLETE CBC W/AUTO DIFF WBC: CPT | Performed by: STUDENT IN AN ORGANIZED HEALTH CARE EDUCATION/TRAINING PROGRAM

## 2024-02-04 PROCEDURE — 250N000013 HC RX MED GY IP 250 OP 250 PS 637: Performed by: STUDENT IN AN ORGANIZED HEALTH CARE EDUCATION/TRAINING PROGRAM

## 2024-02-04 PROCEDURE — 87086 URINE CULTURE/COLONY COUNT: CPT | Performed by: STUDENT IN AN ORGANIZED HEALTH CARE EDUCATION/TRAINING PROGRAM

## 2024-02-04 RX ORDER — METOCLOPRAMIDE HYDROCHLORIDE 5 MG/ML
5 INJECTION INTRAMUSCULAR; INTRAVENOUS EVERY 6 HOURS PRN
Status: DISCONTINUED | OUTPATIENT
Start: 2024-02-04 | End: 2024-02-06 | Stop reason: HOSPADM

## 2024-02-04 RX ORDER — ONDANSETRON 2 MG/ML
4 INJECTION INTRAMUSCULAR; INTRAVENOUS EVERY 30 MIN PRN
Status: DISCONTINUED | OUTPATIENT
Start: 2024-02-04 | End: 2024-02-04

## 2024-02-04 RX ORDER — NICOTINE POLACRILEX 4 MG
15-30 LOZENGE BUCCAL
Status: DISCONTINUED | OUTPATIENT
Start: 2024-02-04 | End: 2024-02-06 | Stop reason: HOSPADM

## 2024-02-04 RX ORDER — CYCLOBENZAPRINE HCL 10 MG
10 TABLET ORAL 3 TIMES DAILY
Status: DISCONTINUED | OUTPATIENT
Start: 2024-02-05 | End: 2024-02-06 | Stop reason: HOSPADM

## 2024-02-04 RX ORDER — AMOXICILLIN 250 MG
1 CAPSULE ORAL 2 TIMES DAILY PRN
Status: DISCONTINUED | OUTPATIENT
Start: 2024-02-04 | End: 2024-02-06 | Stop reason: HOSPADM

## 2024-02-04 RX ORDER — ONDANSETRON 4 MG/1
4 TABLET, ORALLY DISINTEGRATING ORAL EVERY 6 HOURS PRN
Status: DISCONTINUED | OUTPATIENT
Start: 2024-02-04 | End: 2024-02-06 | Stop reason: HOSPADM

## 2024-02-04 RX ORDER — HYDROMORPHONE HCL IN WATER/PF 6 MG/30 ML
0.4 PATIENT CONTROLLED ANALGESIA SYRINGE INTRAVENOUS
Status: DISCONTINUED | OUTPATIENT
Start: 2024-02-04 | End: 2024-02-04

## 2024-02-04 RX ORDER — ONDANSETRON 2 MG/ML
4 INJECTION INTRAMUSCULAR; INTRAVENOUS EVERY 6 HOURS PRN
Status: DISCONTINUED | OUTPATIENT
Start: 2024-02-04 | End: 2024-02-06 | Stop reason: HOSPADM

## 2024-02-04 RX ORDER — HYDROMORPHONE HCL IN WATER/PF 6 MG/30 ML
0.2 PATIENT CONTROLLED ANALGESIA SYRINGE INTRAVENOUS
Status: DISCONTINUED | OUTPATIENT
Start: 2024-02-04 | End: 2024-02-04

## 2024-02-04 RX ORDER — IOPAMIDOL 755 MG/ML
500 INJECTION, SOLUTION INTRAVASCULAR ONCE
Status: COMPLETED | OUTPATIENT
Start: 2024-02-04 | End: 2024-02-04

## 2024-02-04 RX ORDER — POLYETHYLENE GLYCOL 3350 17 G/17G
17 POWDER, FOR SOLUTION ORAL DAILY PRN
Status: DISCONTINUED | OUTPATIENT
Start: 2024-02-04 | End: 2024-02-06 | Stop reason: HOSPADM

## 2024-02-04 RX ORDER — AMOXICILLIN 250 MG
2 CAPSULE ORAL 2 TIMES DAILY PRN
Status: DISCONTINUED | OUTPATIENT
Start: 2024-02-04 | End: 2024-02-06 | Stop reason: HOSPADM

## 2024-02-04 RX ORDER — DEXTROSE, SODIUM CHLORIDE, SODIUM LACTATE, POTASSIUM CHLORIDE, AND CALCIUM CHLORIDE 5; .6; .31; .03; .02 G/100ML; G/100ML; G/100ML; G/100ML; G/100ML
INJECTION, SOLUTION INTRAVENOUS CONTINUOUS
Status: DISCONTINUED | OUTPATIENT
Start: 2024-02-04 | End: 2024-02-06 | Stop reason: HOSPADM

## 2024-02-04 RX ORDER — CHOLESTYRAMINE LIGHT 4 G/5.7G
1 POWDER, FOR SUSPENSION ORAL 2 TIMES DAILY
Status: DISCONTINUED | OUTPATIENT
Start: 2024-02-04 | End: 2024-02-06 | Stop reason: HOSPADM

## 2024-02-04 RX ORDER — HYDROMORPHONE HYDROCHLORIDE 1 MG/ML
0.5 INJECTION, SOLUTION INTRAMUSCULAR; INTRAVENOUS; SUBCUTANEOUS EVERY 30 MIN PRN
Status: DISCONTINUED | OUTPATIENT
Start: 2024-02-04 | End: 2024-02-04

## 2024-02-04 RX ORDER — OXYCODONE HYDROCHLORIDE 5 MG/1
5 TABLET ORAL EVERY 4 HOURS PRN
Status: DISCONTINUED | OUTPATIENT
Start: 2024-02-04 | End: 2024-02-06 | Stop reason: HOSPADM

## 2024-02-04 RX ORDER — PRAVASTATIN SODIUM 20 MG
40 TABLET ORAL AT BEDTIME
Status: DISCONTINUED | OUTPATIENT
Start: 2024-02-04 | End: 2024-02-06 | Stop reason: HOSPADM

## 2024-02-04 RX ORDER — ACETAMINOPHEN 650 MG/1
650 SUPPOSITORY RECTAL EVERY 4 HOURS PRN
Status: DISCONTINUED | OUTPATIENT
Start: 2024-02-04 | End: 2024-02-06 | Stop reason: HOSPADM

## 2024-02-04 RX ORDER — ACETAMINOPHEN 325 MG/1
650 TABLET ORAL EVERY 4 HOURS PRN
Status: DISCONTINUED | OUTPATIENT
Start: 2024-02-04 | End: 2024-02-06 | Stop reason: HOSPADM

## 2024-02-04 RX ORDER — LIDOCAINE 4 G/G
1 PATCH TOPICAL
Status: DISCONTINUED | OUTPATIENT
Start: 2024-02-04 | End: 2024-02-06 | Stop reason: HOSPADM

## 2024-02-04 RX ORDER — ENOXAPARIN SODIUM 100 MG/ML
40 INJECTION SUBCUTANEOUS EVERY 24 HOURS
Status: DISCONTINUED | OUTPATIENT
Start: 2024-02-05 | End: 2024-02-06 | Stop reason: HOSPADM

## 2024-02-04 RX ORDER — DILTIAZEM HYDROCHLORIDE 180 MG/1
180 CAPSULE, COATED, EXTENDED RELEASE ORAL DAILY
Status: DISCONTINUED | OUTPATIENT
Start: 2024-02-05 | End: 2024-02-06 | Stop reason: HOSPADM

## 2024-02-04 RX ORDER — LEVOTHYROXINE SODIUM 100 UG/1
100 TABLET ORAL DAILY
Status: DISCONTINUED | OUTPATIENT
Start: 2024-02-05 | End: 2024-02-06 | Stop reason: HOSPADM

## 2024-02-04 RX ORDER — DEXTROSE MONOHYDRATE 25 G/50ML
25-50 INJECTION, SOLUTION INTRAVENOUS
Status: DISCONTINUED | OUTPATIENT
Start: 2024-02-04 | End: 2024-02-06 | Stop reason: HOSPADM

## 2024-02-04 RX ORDER — PANTOPRAZOLE SODIUM 40 MG/1
40 TABLET, DELAYED RELEASE ORAL DAILY
Status: DISCONTINUED | OUTPATIENT
Start: 2024-02-05 | End: 2024-02-06 | Stop reason: HOSPADM

## 2024-02-04 RX ADMIN — HYDROMORPHONE HYDROCHLORIDE 0.5 MG: 1 INJECTION, SOLUTION INTRAMUSCULAR; INTRAVENOUS; SUBCUTANEOUS at 17:56

## 2024-02-04 RX ADMIN — IOPAMIDOL 75 ML: 755 INJECTION, SOLUTION INTRAVENOUS at 18:25

## 2024-02-04 RX ADMIN — HYDROMORPHONE HYDROCHLORIDE 0.5 MG: 1 INJECTION, SOLUTION INTRAMUSCULAR; INTRAVENOUS; SUBCUTANEOUS at 17:17

## 2024-02-04 RX ADMIN — CEPHALEXIN 500 MG: 250 CAPSULE ORAL at 20:45

## 2024-02-04 RX ADMIN — ONDANSETRON 4 MG: 2 INJECTION INTRAMUSCULAR; INTRAVENOUS at 19:35

## 2024-02-04 RX ADMIN — SODIUM CHLORIDE 60 ML: 9 INJECTION, SOLUTION INTRAVENOUS at 18:25

## 2024-02-04 RX ADMIN — ONDANSETRON 4 MG: 2 INJECTION INTRAMUSCULAR; INTRAVENOUS at 18:08

## 2024-02-04 NOTE — TELEPHONE ENCOUNTER
Paged from Dr. Mcgee regarding patient     Patient is s/p T11 to pelvis robotic decompression and fusion DOS 10/25 with Dr. Waite complicated with hemorrhagic shock     Has been recovering well   2 day of right paraspinous mid to lower lumbar pain   Denies fever. No infectious s/s per EDMD. WBC normal. CRP ESR pending.  Incision has healed well     PLAN  -lumbar CT w/wo contrast   -lumbar upright XR   -if any concerns on CT, obtain MRI     EDMD Dr. Mcgee will keep our team updated     Dr. Grossman in agreement    Irina Matute PA-C  [facility]  [address] [address]    Pager [phone]

## 2024-02-04 NOTE — ED TRIAGE NOTES
Pt presents with sharp right low back/hip pain. KNI. Pain started Thursday and is worsening.

## 2024-02-05 ENCOUNTER — APPOINTMENT (OUTPATIENT)
Dept: OCCUPATIONAL THERAPY | Facility: CLINIC | Age: 84
End: 2024-02-05
Payer: COMMERCIAL

## 2024-02-05 ENCOUNTER — APPOINTMENT (OUTPATIENT)
Dept: GENERAL RADIOLOGY | Facility: CLINIC | Age: 84
End: 2024-02-05
Attending: INTERNAL MEDICINE
Payer: COMMERCIAL

## 2024-02-05 ENCOUNTER — APPOINTMENT (OUTPATIENT)
Dept: PHYSICAL THERAPY | Facility: CLINIC | Age: 84
End: 2024-02-05
Attending: INTERNAL MEDICINE
Payer: COMMERCIAL

## 2024-02-05 LAB
ANION GAP SERPL CALCULATED.3IONS-SCNC: 13 MMOL/L (ref 7–15)
BACTERIA UR CULT: ABNORMAL
BUN SERPL-MCNC: 15 MG/DL (ref 8–23)
CALCIUM SERPL-MCNC: 9 MG/DL (ref 8.8–10.2)
CHLORIDE SERPL-SCNC: 106 MMOL/L (ref 98–107)
CREAT SERPL-MCNC: 1.13 MG/DL (ref 0.51–0.95)
DEPRECATED HCO3 PLAS-SCNC: 22 MMOL/L (ref 22–29)
EGFRCR SERPLBLD CKD-EPI 2021: 49 ML/MIN/1.73M2
ERYTHROCYTE [DISTWIDTH] IN BLOOD BY AUTOMATED COUNT: 13.7 % (ref 10–15)
GLUCOSE BLDC GLUCOMTR-MCNC: 100 MG/DL (ref 70–99)
GLUCOSE BLDC GLUCOMTR-MCNC: 119 MG/DL (ref 70–99)
GLUCOSE BLDC GLUCOMTR-MCNC: 132 MG/DL (ref 70–99)
GLUCOSE BLDC GLUCOMTR-MCNC: 151 MG/DL (ref 70–99)
GLUCOSE SERPL-MCNC: 150 MG/DL (ref 70–99)
HCT VFR BLD AUTO: 30.6 % (ref 35–47)
HGB BLD-MCNC: 9.1 G/DL (ref 11.7–15.7)
MCH RBC QN AUTO: 26.1 PG (ref 26.5–33)
MCHC RBC AUTO-ENTMCNC: 29.7 G/DL (ref 31.5–36.5)
MCV RBC AUTO: 88 FL (ref 78–100)
PLATELET # BLD AUTO: 244 10E3/UL (ref 150–450)
POTASSIUM SERPL-SCNC: 3.8 MMOL/L (ref 3.4–5.3)
RBC # BLD AUTO: 3.48 10E6/UL (ref 3.8–5.2)
SODIUM SERPL-SCNC: 141 MMOL/L (ref 135–145)
WBC # BLD AUTO: 7.5 10E3/UL (ref 4–11)

## 2024-02-05 PROCEDURE — 82962 GLUCOSE BLOOD TEST: CPT

## 2024-02-05 PROCEDURE — 36415 COLL VENOUS BLD VENIPUNCTURE: CPT | Performed by: INTERNAL MEDICINE

## 2024-02-05 PROCEDURE — 97165 OT EVAL LOW COMPLEX 30 MIN: CPT | Mod: GO | Performed by: SPECIALIST/TECHNOLOGIST

## 2024-02-05 PROCEDURE — 97162 PT EVAL MOD COMPLEX 30 MIN: CPT | Mod: GP | Performed by: PHYSICAL THERAPIST

## 2024-02-05 PROCEDURE — 250N000011 HC RX IP 250 OP 636: Mod: JZ | Performed by: INTERNAL MEDICINE

## 2024-02-05 PROCEDURE — 80048 BASIC METABOLIC PNL TOTAL CA: CPT | Performed by: INTERNAL MEDICINE

## 2024-02-05 PROCEDURE — G0378 HOSPITAL OBSERVATION PER HR: HCPCS

## 2024-02-05 PROCEDURE — 96375 TX/PRO/DX INJ NEW DRUG ADDON: CPT

## 2024-02-05 PROCEDURE — 999N000123 HC STATISTIC OXYGEN O2DAILY TECH TIME

## 2024-02-05 PROCEDURE — 85027 COMPLETE CBC AUTOMATED: CPT | Performed by: INTERNAL MEDICINE

## 2024-02-05 PROCEDURE — 96372 THER/PROPH/DIAG INJ SC/IM: CPT | Performed by: INTERNAL MEDICINE

## 2024-02-05 PROCEDURE — 999N000156 HC STATISTIC RCP CONSULT EA 30 MIN

## 2024-02-05 PROCEDURE — 96361 HYDRATE IV INFUSION ADD-ON: CPT

## 2024-02-05 PROCEDURE — 99233 SBSQ HOSP IP/OBS HIGH 50: CPT | Performed by: NURSE PRACTITIONER

## 2024-02-05 PROCEDURE — 250N000013 HC RX MED GY IP 250 OP 250 PS 637: Performed by: INTERNAL MEDICINE

## 2024-02-05 PROCEDURE — 999N000157 HC STATISTIC RCP TIME EA 10 MIN

## 2024-02-05 PROCEDURE — 71045 X-RAY EXAM CHEST 1 VIEW: CPT

## 2024-02-05 RX ADMIN — DILTIAZEM HYDROCHLORIDE 180 MG: 180 CAPSULE, COATED, EXTENDED RELEASE ORAL at 08:11

## 2024-02-05 RX ADMIN — CHOLESTYRAMINE 4 G: 4 POWDER, FOR SUSPENSION ORAL at 21:22

## 2024-02-05 RX ADMIN — PRAVASTATIN SODIUM 40 MG: 20 TABLET ORAL at 21:22

## 2024-02-05 RX ADMIN — ACETAMINOPHEN 650 MG: 325 TABLET, FILM COATED ORAL at 08:11

## 2024-02-05 RX ADMIN — CYCLOBENZAPRINE 10 MG: 10 TABLET, FILM COATED ORAL at 08:11

## 2024-02-05 RX ADMIN — CYCLOBENZAPRINE 10 MG: 10 TABLET, FILM COATED ORAL at 21:22

## 2024-02-05 RX ADMIN — LIDOCAINE 1 PATCH: 4 PATCH TOPICAL at 21:22

## 2024-02-05 RX ADMIN — SODIUM CHLORIDE, SODIUM LACTATE, POTASSIUM CHLORIDE, CALCIUM CHLORIDE AND DEXTROSE MONOHYDRATE: 5; 600; 310; 30; 20 INJECTION, SOLUTION INTRAVENOUS at 16:31

## 2024-02-05 RX ADMIN — CHOLESTYRAMINE 4 G: 4 POWDER, FOR SUSPENSION ORAL at 08:17

## 2024-02-05 RX ADMIN — ENOXAPARIN SODIUM 40 MG: 40 INJECTION SUBCUTANEOUS at 08:10

## 2024-02-05 RX ADMIN — METOCLOPRAMIDE HYDROCHLORIDE 5 MG: 5 INJECTION INTRAMUSCULAR; INTRAVENOUS at 00:07

## 2024-02-05 RX ADMIN — PANTOPRAZOLE SODIUM 40 MG: 40 TABLET, DELAYED RELEASE ORAL at 08:11

## 2024-02-05 RX ADMIN — CYCLOBENZAPRINE 10 MG: 10 TABLET, FILM COATED ORAL at 13:01

## 2024-02-05 RX ADMIN — LEVOTHYROXINE SODIUM 100 MCG: 100 TABLET ORAL at 08:11

## 2024-02-05 RX ADMIN — LIDOCAINE 1 PATCH: 4 PATCH TOPICAL at 00:37

## 2024-02-05 RX ADMIN — ACETAMINOPHEN 650 MG: 325 TABLET, FILM COATED ORAL at 21:26

## 2024-02-05 RX ADMIN — SODIUM CHLORIDE, SODIUM LACTATE, POTASSIUM CHLORIDE, CALCIUM CHLORIDE AND DEXTROSE MONOHYDRATE: 5; 600; 310; 30; 20 INJECTION, SOLUTION INTRAVENOUS at 00:07

## 2024-02-05 NOTE — PROGRESS NOTES
PRIMARY DIAGNOSIS: ACUTE PAIN  OUTPATIENT/OBSERVATION GOALS TO BE MET BEFORE DISCHARGE:  1. Pain Status: Improved-controlled with oral pain medications- lidocaine patch to right lower back.     2. Return to near baseline physical activity: No, has not been OOB, still requiring O2. Noted to be mouth breathing when sleeping and had periods of apnea when resting. Tolerated sips of water and a few bites of toast this morning. Still requiring 1 LPM nasal cannula, unable to wean off O2 with multiple attempts this shift.     3. Cleared for discharge by consultants (if involved): No    Discharge Planner Nurse   Safe discharge environment identified: No  Barriers to discharge: Yes       Entered by: Shelia Bailey RN 02/05/2024 8:11 AM     Please review provider order for any additional goals.   Nurse to notify provider when observation goals have been met and patient is ready for discharge.

## 2024-02-05 NOTE — PROVIDER NOTIFICATION
Patient required some O2 after dilaudid in ED as well as after emesis, now. Notified Dr. Cedillo of patient needing oxygen, asked for order for O2 with parameters.

## 2024-02-05 NOTE — PROGRESS NOTES
S-(situation): Patient registered to Observation. Patient arrived to room 249 via cart from ED.    B-(background): Patient with previous fusions to back, presented to ED with pain and difficulty ambulating, found to have new compression fracture at L1 and 2 cm fluid next to L4 and L5 with screws loose at T11 and T12.     A-(assessment): Patient drowsy upon admission, requiring supplemental O2 at 2 LPM nasal cannula, and had multiple emesis bouts with any movement. (Per report from ED, patient required O2 after dilaudid in ED. Was on room air when came up, but after vomiting noted to have decreased saturations and was placed on oxygen. )Used air mat to transfer patient. Patient has redness on coccyx, mepilex placed. Redness in groin, powdered. External catheter utilized and incontinent brief worn. Due to poor tolerance with repositioning, placed DIDIER air canister on mattress to prevent skin breakdown. Patient oriented x 4. Lungs clear. Reports intolerance to narcotic medications and had dilaudid in ED. ED had given zofran x 2 and this did not resolved symptoms so asked for reglan which was effective for patient. . Due to multuple emesis bouts and unable to tolerate orals, held HS meds and asked provider for IVF. D5 LR administered at 75 ml/hour per order.     R-(recommendations): Orders and observation goals reviewed with Patient.     Nursing Observation criteria listed below was met:    Skin issues/needs documented:Yes  Isolation needs addressed and Signage up: NA  Fall Prevention: Education given and documented: Yes  Education Assessment documented:Yes  Admission Education Documented: Yes  New medication patient education completed and documented (Possible Side Effects of Common Medications handout): Yes  OBS video/handout Reviewed & Documented: Yes  Allergies Reviewed: Yes  Medication Reconciliation Complete: Yes  Home medications if not able to send immediately home with family stored here: NA  Reminder note  placed in discharge instructions of home meds: NA  Patient has discharge needs (If yes, please explain): Yes PT eval for safety to return home vs TCU.  Patient discharge preferences addressed and charted on white board:  Yes  Provider notified that patient has arrived to the unit: Yes

## 2024-02-05 NOTE — MEDICATION SCRIBE - ADMISSION MEDICATION HISTORY
Medication Scribe Admission Medication History    Admission medication history is complete. The information provided in this note is only as accurate as the sources available at the time of the update.    Information Source(s): Family member daughter April via in-person    Pertinent Information: patient not currently checking blood sugars. Family reports that Hydralazine was placed on hold by primary provider since early January until further notice.     Changes made to PTA medication list:  Added: None  Deleted: Senna-docusate 8.6-50 mg - no longer taking   Changed: Oxybutynin and Pravastatin both changed to taking at HS per family's report     Medication Affordability:       Allergies reviewed with patient and updates made in EHR: yes    Medication History Completed By: EVON LOWRY 2/4/2024 7:49 PM    PTA Med List   Medication Sig Last Dose    acetaminophen (TYLENOL) 500 MG tablet Take 1,000 mg by mouth every 6 hours as needed for mild pain 2/4/2024 at 1300    cholestyramine (QUESTRAN) 4 g packet DISSOLVE AND TAKE ONE PACKET BY MOUTH TWICE A DAY (Patient taking differently: Take 1 packet by mouth 2 times daily) 2/4/2024 at am    diltiazem ER COATED BEADS (CARDIZEM CD/CARTIA XT) 180 MG 24 hr capsule Take 1 capsule (180 mg) by mouth daily 2/4/2024 at am    levothyroxine (SYNTHROID/LEVOTHROID) 100 MCG tablet Take 1 tablet (100 mcg) by mouth daily 2/4/2024 at am    oxyBUTYnin ER (DITROPAN XL) 5 MG 24 hr tablet Take 1 tablet (5 mg) by mouth daily (Patient taking differently: Take 5 mg by mouth at bedtime) 2/3/2024 at hs    pantoprazole (PROTONIX) 40 MG EC tablet Take 1 tablet (40 mg) by mouth daily 2/4/2024 at am    polyethylene glycol (MIRALAX) 17 g packet Take 17 g by mouth daily Hold for loose stools More than a month at prn - on hand    pravastatin (PRAVACHOL) 40 MG tablet Take 1 tablet (40 mg) by mouth daily (Patient taking differently: Take 40 mg by mouth at bedtime) 2/3/2024 at hs

## 2024-02-05 NOTE — PROGRESS NOTES
[facility]  OUTPATIENT OCCUPATIONAL THERAPY  EVALUATION  PLAN OF TREATMENT FOR OUTPATIENT REHABILITATION  (COMPLETE FOR INITIAL CLAIMS ONLY)  Patient's Last Name, First Name, M.I.  YOB: 1940  Mya Hui                          Provider's Name  [facility] Medical Record No.  [id number]                             Onset Date:  02/04/24   Start of Care Date:   2/5/24  Through: 2/11/24   Type:     ___PT   _X_OT   ___SLP Medical Diagnosis:         Acute on chronic lower back pain  Recent lumbar fusion              OT Diagnosis:  Impaired ADLs Visits from SOC:  1     See note for plan of treatment, functional goals and certification details    I CERTIFY THE NEED FOR THESE SERVICES FURNISHED UNDER        THIS PLAN OF TREATMENT AND WHILE UNDER MY CARE     (Physician co-signature of this document indicates review and certification of the therapy plan).                               02/05/24 1300   Appointment Info   Signing Clinician's Name / Credentials (OT) Lola Montiel OTR/L   Living Environment   People in Home alone  (halfway)   Current Living Arrangements assisted living   Home Accessibility no concerns   Transportation Anticipated agency;health plan transportation   Living Environment Comments All meals provided, staff asssits with walking down to meals. Medication setup. Walk in shower with grab bars and shower chair, has FWW and grabber. Has adjustable bed. Has [employer] watch   Self-Care   Usual Activity Tolerance moderate   Current Activity Tolerance fair   Regular Exercise No   Equipment Currently Used at Home walker, rolling;grab bar, toilet;grab bar, tub/shower;shower chair   Fall history within last six months no   Activity/Exercise/Self-Care Comment Assist with bathing, socks, and shoes at baseline. Otherwise independent with toileting   General Information   Onset of Illness/Injury or Date of Surgery 02/04/24   Referring  "Physician Princess Peng, CNP   Patient/Family Therapy Goal Statement (OT) To get stronger before going home, agreeable to TCU   Additional Occupational Profile Info/Pertinent History of Current Problem Per chart review, \"Per chart review, \"Mya Hui is a 83 year old female with complex medical history including hypertension, diabetes, hypothyroidism, CKD, chronic low back pain, s/p lumbar fusion who presents for evaluation of right lower back pain.  Patient underwent extensive lower back surgery from T11 through L5 (multilevel fusion, laminectomies/decompression, discectomy) with Dr. Waite on 10/25.  According to the patient and family, this procedure was subsequently complicated by hemorrhagic shock.  Patient had an extended hospitalization and TCU stay before being discharged to an assisted living facility in January.  Since then her back had been doing quite well.  Then starting yesterday morning she developed a new pain to the right paraspinal lumbar region without obvious injury or strain.  This has worsened severely and she is now having difficulty bearing weight or walking due to pain.  No radiation of pain and she denies any clear involvement of the hip/leg.\"\"   Existing Precautions/Restrictions brace worn when out of bed   General Observations and Info OT Orders, Eval and Treat; No known precautions   Cognitive Status Examination   Orientation Status orientation to person, place and time   Cognitive Status Comments Engages in discussion appropriately, no overt cog concerns noted this date   Visual Perception   Visual Impairment/Limitations corrective lenses full-time   Sensory   Sensory Comments Denies acute changes   Pain Assessment   Patient Currently in Pain   (Pain free at rest, increased pain to approx 5/10 with bed mobility to sit EOB, improves with discontinuation of activity)   Posture   Posture forward head position;protracted shoulders   Range of Motion Comprehensive   Comment, General Range of " Motion R shoudler flexion deficits secondary to chronic rotator cuff pathology. Otherwise BUE AROM WFL. R PROM to ~120 shoulder flexion   Strength Comprehensive (MMT)   Comment, General Manual Muscle Testing (MMT) Assessment R shoulder flexion 3+/5, L 4-/5; B elbow flexion/extension and  4/5   Coordination   Coordination Comments No overt coordination deficits   Bed Mobility   Comment (Bed Mobility) Mod A with Hand hold and trunk support for bed mob to EOB from fowlers, min A for R LE advancement. Mod A for sit>supine with max A required for managment of LEs into bed. Ax2 for boosting up in bed, able to drive through heels to assist    Transfers   Transfer Comments Declines trial of STS at this time   Balance   Balance Comments Defer to PT   Activities of Daily Living   BADL Assessment/Intervention upper body dressing;lower body dressing;toileting   Upper Body Dressing Assessment/Training   Comment, (Upper Body Dressing) Min A for TLSO donning, reaching behind back with R   Lower Body Dressing Assessment/Training   Comment, (Lower Body Dressing) Max A for socks, has assist at baseline, Occasionally uses grabber for threading pants   Toileting   Comment, (Toileting) Anticipated mod A, with assist for transfer and balance   Clinical Impression   Criteria for Skilled Therapeutic Interventions Met (OT) Yes, treatment indicated   OT Diagnosis Impaired ADLs   Influenced by the following impairments Low Back Pain, Recent hospitalization, s/p back surgery, Weakness   OT Problem List-Impairments impacting ADL problems related to;activity tolerance impaired;balance;mobility;range of motion (ROM);strength;pain;postural control   Assessment of Occupational Performance 1-3 Performance Deficits   Identified Performance Deficits Dressing, toileting, mobility   Planned Therapy Interventions (OT) ADL retraining;bed mobility training;strengthening;transfer training;home program guidelines;progressive activity/exercise    Intervention Comments Functional endurance, ADLs   Clinical Decision Making Complexity (OT) problem focused assessment/low complexity   Risk & Benefits of therapy have been explained evaluation/treatment results reviewed;care plan/treatment goals reviewed;participants included;patient   Clinical Impression Comments Patient presents with functional ADL and mobility deficits secondary to back pain and weakness, will benefit from skilled OT for below stated goals   OT Total Evaluation Time   OT Eval, Low Complexity Minutes (32320) 20   OT Goals   Therapy Frequency (OT) 4 times/week   OT Predicted Duration/Target Date for Goal Attainment 02/11/24   OT Goals Upper Body Dressing;Lower Body Dressing;Toilet Transfer/Toileting   OT: Upper Body Dressing Modified independent   OT: Lower Body Dressing Modified independent   OT: Toilet Transfer/Toileting Modified independent   OT Discharge Planning   OT Plan 1/4 MON: ADLs, Endurance   OT Discharge Recommendation (DC Rec) Transitional Care Facility   OT Rationale for DC Rec Here from [facility], with FWW, A with socks/shoes and bathing, otherwise ind with dressing and toileting. Meals, medsetup, laundry, housekeeping provided. Assist for ambulation down to meals. Currently below funcitonal baseline secondary to pain, weakness will benefit from TCU placement for strength and endurance and progression of ADLs and related mobility   OT Brief overview of current status Mod A trunk and hand hold to sit EOB, declines STS, assist for LEs into bed from seated. 5/10 pain with activity, improves with discontinuation. Min A for TLSO donning/doffing, Max A for socks. Ax2 for boosting up in bed, able to drive through heels to assist    Total Session Time   Total Session Time (sum of timed and untimed services) 20     Thank you for your referral.  Lola Montiel OTR/L     [facility]  O: [phone]  E: [email or web address]

## 2024-02-05 NOTE — PROGRESS NOTES
Paged from Dr. Mcgee regarding patient      Patient is s/p T11 to pelvis robotic decompression and fusion DOS 10/25 with Dr. Waite complicated with hemorrhagic shock      Has been recovering well   2 day of right paraspinous mid to lower lumbar pain   Denies fever. No infectious s/s per EDMD. WBC normal. CRP ESR pending.  Incision has healed well     LUMBAR CT as demonstrated below   WBC ESR CRP normal    PLAN  -treat compression fracture conservatively with light activity and pain control. Continue wearing TLSO when ambulating and upright. Follow up with our team in 3-4 weeks with upright XR     -loose pedicle screws: will arrange a follow up with Dr. Waite in clinic in the upcoming weeks.     History, imaging and plans reviewed with Dr. Grossman and Dr. Arely GARCIA who were in agreement    Irina Matute PA-C  [facility] [place] [address]    Pager [phone]    EXAM: CT LUMBAR SPINE W/O and W CONTRAST  LOCATION: [facility]  DATE: 2/4/2024     INDICATION: Pain post op surgery  COMPARISON:  CT abdomen pelvis 10/31/2023, lumbar spine CT 10/16/2023, lumbar radiographs 11/08/2023.  CONTRAST: Isovue 370 75 mL  TECHNIQUE: Routine CT Lumbar Spine with IV contrast. Multiplanar reformats. Dose reduction techniques were used.     FINDINGS:  VERTEBRA: Diffuse demineralization. Postoperative changes from T11-S1 PLIF with right L3-L4 and L4-L5 TLIFs and bilateral sacroiliac anchors. Multilevel laminectomies. No acute hardware fracture. Interval loosening of the bilateral T11 and right T12   pedicle screws. New L1 superior endplate compression fracture with less than 25% vertebral body height loss. Scoliosis. Minimal retrolisthesis of T12 on L1 and minimal anterolisthesis of L1 on L2. Minimal anterolisthesis of L4 on L5.     CANAL/FORAMINA: Not well assessed due to beam hardening from the fusion construct.      PARASPINAL: Postoperative changes of the dorsal paraspinal soft tissues with possible small superficial fluid collection on the right measuring 1.8 x 2.0 cm at L4-L5 level (series 9, image 116).                                                                      IMPRESSION:  1.  Multilevel postoperative changes of the lumbar spine from T11-S1 with interval loosening of the bilateral T11 and right T12 pedicle screws since 10/16/2023.  2.  New L1 superior endplate compression fracture with less than 25% vertebral body height loss since 10/16/2023.  3.  Sterility indeterminate 2 cm superficial fluid collection in the superficial postoperative bed on the right at L4-L5.

## 2024-02-05 NOTE — H&P
[facility]    History and Physical - Hospitalist Service       Date of Admission:  2/4/2024    Assessment & Plan      Mya Hui is a 83 year old female admitted on 2/4/2024. She presented with acute on chronic back pain.     Acute on chronic lower back pain  Recent Lumbar fusion (10/25/2023)  She presents with new back pain and most likely has musculoskeletal pain. She has an extensive history of lower back pain and had spinal fusion done on 10/25/2023. She had a complicated course with shock perioperatively from an arhythmia. The CT imaging here shows some loose pedicle screws and a new compression fracture. Neurosurgery was consulted and graciously reviewed her imaging. They recommended outpatient follow up and pain management.   - observation status  - c/w pain control  - PT/OT  - c/w TLSO brace    Acute respiratory failure  She developed some mild hypoxia requiring 2LNC after she vomited.   - check chest X-ray to rule out aspiration  - c/w supplemental oxygen and try to wean    CKD 3  Her creatinine is at 1.4, which is much improved from where she was when she was last admitted here.     T2DM  - insulin sliding scale    Asymptomatic pyuria  She was noted to have pyuria on a routine UA. She is asymptomatic. She was given one dose of cefazolin in the ED. I do not think that we need to continue treating this, so I did not continue.     Hypothyroidism  - c/w levothyroxine    Hypertension  - c/w diltiazem and hydralazine    Hyperlipidemia  - c/w pravastatin       Diet: Combination Diet Moderate Consistent Carb (60 g CHO per Meal) Diet; Low Saturated Fat Na <2400mg Diet, No Caffeine DietCardiac/diabetic  diet  DVT Prophylaxis: Enoxaparin (Lovenox) SQ  Spicer Catheter: Not present  Lines: None     Code Status: No CPR- Do NOT Intubate I spoke with the patient and asked her if she would like to be placed on a ventilator if she could not breathe, or have chest compressions and/or  "shocks if her heart stopped, and she said no to both.     Clinically Significant Risk Factors Present on Admission                    # Hypertension: Noted on problem list      # Obesity: Estimated body mass index is 37.74 kg/m  as calculated from the following:    Height as of this encounter: 1.575 m (5' 2\").    Weight as of this encounter: 93.6 kg (206 lb 5.6 oz).         # Financial/Environmental Concerns:           Disposition Plan      Expected Discharge Date: 02/05/2024                The patient's care was discussed with the Bedside Nurse and Patient.        Cruz Cedillo MD  [facility]  Securely message with the Vocera Web Console (learn more here)  Text page via [facility] Paging/Directory      Visit/Communication Style   Virtual (Video) communication was used to evaluate Grady Roque consented to the use of video communication: yes  Video START time: 2310, 2/4/2024  Video STOP time: 2330, 2/4/2024   Patient's location: [facility]   Provider's location during the visit: [facility] Tele-medicine site        ______________________________________________________________________    Chief Complaint   Lower back pain    History is obtained from the patient    History of Present Illness   Mya Hui is a 83 year old female who  presented to the ED with acute on chronic lower back pain. She recently underwent fusion of her lumbar spine on 10/25/2023, and required 1 month at a LTC before going to a SNF for continued rehab. She had acute onset of lower back pain that was more on the right side and would shoot down to her foot when she put weight on that foot. She has known sciatica and stated that it felt like that pain. She denied any paresthesias but she had some weakness in that leg. She also denies any fevers, chills, N/V/D/C prior to receiving dilaudid in the ED, SOB, chest pain, abdominal pain, dysuria or change in the smell or color of her " urine.     ED Course: She was evaluated in the ED by Dr. Mcgee and he obtained a CT lumbar spine that showed some loose pedicle screws and also a new lumbar compression fracture. He spoke to the neurosurgery attending on call and was reassured that there was no acute surgical issue here. Dr. Mcgee also gave a dose of cephalexin in the ED for a presumptive UTI.     Review of Systems    General: negative for fever, chills, sweats, weakness  Eyes: negative for blurred vision, loss of vision  Ear Nose and Throat: negative for pharyngitis, speech or swallowing difficulties  Respiratory:  negative for sputum production, wheezing, MAYORGA, pleuritic pain, sob or cough  Cardiology:  negative for chest pain, palpitations, orthopnea, PND, edema, syncope   Gastrointestinal: negative for abdominal pain, nausea, vomiting, diarrhea, constipation, hematemesis, melena or hematochezia  Genitourinary: negative for frequency, urgency, dysuria, hematuria   Neurological: Positive for right leg weakness. Negative for other focal weakness, paresthesia    Past Medical History    I have reviewed this patient's medical history and updated it with pertinent information if needed.   Past Medical History:   Diagnosis Date    Acute cholecystitis 3/23/2018    Acute kidney injury (H24) 9/10/2017    Arthritis     Dehydration 9/4/2017    Diabetes type 2, controlled (H)     Elevated lactic acid level 9/10/2017    GERD (gastroesophageal reflux disease)     History of renal calculi     HTN, goal below 140/90     Hyperlipidaemia LDL goal < 100     Hypokalemia 9/3/2017    Hypothyroid     Insomnia     Left carotid stenosis     Migraine     Motion sickness     PONV (postoperative nausea and vomiting)     Sciatica of right side 6/28/2013    Type 2 diabetes mellitus without complication, without long-term current use of insulin (H) 2/16/2017       Past Surgical History   I have reviewed this patient's surgical history and updated it with pertinent information  if needed.  Past Surgical History:   Procedure Laterality Date    BUNIONECTOMY      Right foot    CATARACT IOL, RT/LT Bilateral 2017    COLONOSCOPY  7/12/2013    Procedure: COLONOSCOPY;  Colonoscopy;  Surgeon: Giovany Espinoza MD;  Location: [facility]    COLONOSCOPY N/A 1/31/2019    Procedure: Combined Colonoscopy, Single Or Multiple Biopsy/Polypectomy By Biopsy;  Surgeon: Efren Osorio MD;  Location: [facility]    COLONOSCOPY WITH CO2 INSUFFLATION N/A 1/31/2019    Procedure: COLONOSCOPY WITH CO2 INSUFFLATION;  Surgeon: Efren Osorio MD;  Location: [facility]    FRACTURE TX, ANKLE RT/LT      Left ankle    FUSION, SPINE, LUMBAR, 3 OR MORE LEVELS, POSTERIOR APPROACH, ROBOTIC-ASSISTED N/A 10/25/2023    Procedure: Thoracic 11 to pelvis robotic decompression and fusion;  Surgeon: Mumtaz Waite MD;  Location: [facility]    HC CYSTOURETHROSCOPY W/ URETEROSCOPY &/OR PYELOSCOPY; W/ LITHOTRIPSY      HC REVISE MEDIAN N/CARPAL TUNNEL SURG      Right wrist    INJECT EPIDURAL LUMBAR Right 12/27/2018    Procedure: INJECT EPIDURAL LUMBAR right foraminal narrowing severe at L4-L5 and moderate at L5-S1;  Surgeon: Gilbert Mcclure MD;  Location: [facility]    INJECT EPIDURAL TRANSFORAMINAL Bilateral 5/27/2021    Procedure: Bilateral Lumbar 5-sacral 1 Epidural steroid injection;  Surgeon: Gilbert Mcclure MD;  Location: [facility]    INJECT EPIDURAL TRANSFORAMINAL Right 6/17/2022    Procedure: Attempted right Lumbar 4-5 and Lumbar 5-Sacral 1 epidural injections with completion of only the right Lumbar 5-Sacral1 Transforaminal Epidural Steroid Injection with fluoroscopic guidance and contrast;  Surgeon: Gilbert Mcclure MD;  Location: [facility]    INJECT EPIDURAL TRANSFORAMINAL Right 7/13/2023    Procedure: Lumbar 3-4 interlaminar epidural steroid injection using fluoroscopic guidance with contrast dye, Right;  Surgeon: Gilbert Mcclure MD;  Location: [facility]    INJECT JOINT SACROILIAC  4/10/2014    Procedure: INJECT JOINT SACROILIAC;   Sacroiliac Joint Injection;  Surgeon: Gilbert Mcclure MD;  [facility]: [facility]    INJECT JOINT SACROILIAC Left 4/11/2019    Procedure: INJECT JOINT SACROILIAC- LEFT;  Surgeon: Gilbert Mcclure MD;  Location: [facility]    INJECT JOINT SACROILIAC Right 3/3/2023    Procedure: Fluoroscopically-guided injection into the Right Sacroiliac joint;  Surgeon: Gilbert Mcclure MD;  Location: [facility]    IR CVC TUNNEL PLACEMENT > 5 YRS OF AGE  10/30/2023    IR CVC TUNNEL REMOVAL RIGHT  11/24/2023    LAPAROSCOPIC CHOLECYSTECTOMY N/A 3/24/2018    Procedure: LAPAROSCOPIC CHOLECYSTECTOMY;  Laparoscopic Cholecystectomy;  Surgeon: Berry Allen DO;  Location: [facility]       Social History   I have reviewed this patient's social history and updated it with pertinent information if needed.  Social History     Tobacco Use    Smoking status: Never    Smokeless tobacco: Never   Vaping Use    Vaping Use: Never used   Substance Use Topics    Alcohol use: Yes    Drug use: No       Family History   I have reviewed this patient's family history and updated it with pertinent information if needed.  Family History   Problem Relation Age of Onset    Hypertension Brother     Cerebrovascular Disease Brother     Diabetes Father     Cardiovascular Father     Diabetes Brother         Borderline    Breast Cancer Mother     Diabetes Brother     Blood Disease Brother     Cardiovascular Brother         MI       Prior to Admission Medications   Prior to Admission Medications   Prescriptions Last Dose Informant Patient Reported? Taking?   Incontinence Supply Disposable (PROTECTIVE UNDERWEAR SUPER LG) MISC  Daughter No No   Sig: 3 each daily   acetaminophen (TYLENOL) 500 MG tablet 2/4/2024 at 1300 Daughter Yes Yes   Sig: Take 1,000 mg by mouth every 6 hours as needed for mild pain   blood glucose (ACCU-CHEK GUIDE) test strip Not Taking Daughter No No   Sig: Use to test blood sugar 1 times daily or as directed.   Patient not taking: Reported on 2/4/2024   blood  glucose monitoring (ACCU-CHEK FASTCLIX) lancets Not Taking Daughter No No   Sig: USE TO TEST BLOOD SUGARS ONCE DAILY   Patient not taking: Reported on 2/4/2024   cholestyramine (QUESTRAN) 4 g packet 2/4/2024 at am Daughter No Yes   Sig: DISSOLVE AND TAKE ONE PACKET BY MOUTH TWICE A DAY   Patient taking differently: Take 1 packet by mouth 2 times daily   diltiazem ER COATED BEADS (CARDIZEM CD/CARTIA XT) 180 MG 24 hr capsule 2/4/2024 at am Daughter No Yes   Sig: Take 1 capsule (180 mg) by mouth daily   hydrALAZINE (APRESOLINE) 10 MG tablet Not Taking Daughter No No   Sig: Take 1 tablet (10 mg) by mouth every 8 hours   Patient not taking: Reported on 2/4/2024   levothyroxine (SYNTHROID/LEVOTHROID) 100 MCG tablet 2/4/2024 at am Daughter No Yes   Sig: Take 1 tablet (100 mcg) by mouth daily   order for DME  Daughter No No   Sig: Equipment being ordered: [employer] shoes and insoles   oxyBUTYnin ER (DITROPAN XL) 5 MG 24 hr tablet 2/3/2024 at hs Daughter No Yes   Sig: Take 1 tablet (5 mg) by mouth daily   Patient taking differently: Take 5 mg by mouth at bedtime   pantoprazole (PROTONIX) 40 MG EC tablet 2/4/2024 at am Daughter No Yes   Sig: Take 1 tablet (40 mg) by mouth daily   polyethylene glycol (MIRALAX) 17 g packet More than a month at prn - on hand Daughter No Yes   Sig: Take 17 g by mouth daily Hold for loose stools   pravastatin (PRAVACHOL) 40 MG tablet 2/3/2024 at hs Daughter No Yes   Sig: Take 1 tablet (40 mg) by mouth daily   Patient taking differently: Take 40 mg by mouth at bedtime      Facility-Administered Medications: None     Allergies   Allergies   Allergen Reactions    Fentanyl Nausea and Vomiting     VERY sensitive to most narcotics if not all.    Morphine And Related Nausea       Physical Exam   Vital Signs: Temp: 98.2  F (36.8  C) Temp src: Oral BP: (!) 167/86 Pulse: 82   Resp: 18 SpO2: (!) 89 % O2 Device: Nasal cannula Oxygen Delivery: 2 LPM  Weight: 206 lbs 5.61 oz    Gen:  Well-developed, well-nourished,  in no acute distress, lying semi-supine in hospital stretcher  HEENT:  Anicteric sclera, PER, hearing intact to voice  Resp:  No accessory muscle use, breath sounds clear; no wheezes no rales no rhonchi  Card:  No murmur, normal S1, S2   Abd:  Soft per RN exam, no TTP, non-distended, hypoactive bowel sounds are present  Musc:  Normal strength and movement of the major muscle groups without obvious deformity  Psych:  Good insight, oriented to person, place and time, not anxious, not agitated    Data     Recent Labs   Lab 02/04/24  2245 02/04/24  1715   WBC  --  6.6   HGB  --  9.3*   MCV  --  88   PLT  --  241   NA  --  140   POTASSIUM  --  4.1   CHLORIDE  --  107   CO2  --  22   BUN  --  18.8   CR  --  1.35*   ANIONGAP  --  11   TERESA  --  9.2   * 109*   ALBUMIN  --  4.1   PROTTOTAL  --  7.0   BILITOTAL  --  0.3   ALKPHOS  --  92   ALT  --  10   AST  --  17         Recent Results (from the past 24 hour(s))   XR Thoracic Lumbar Spine 2 Views    Narrative    EXAM: XR THORACIC LUMBAR SPINE 2 VIEWS  LOCATION: [facility]  DATE: 2/4/2024    INDICATION: Hx of lumbar surgery, requested per neurosurgery team  COMPARISON:  Same day CT lumbar spine.      Impression    IMPRESSION: Multilevel postoperative from T11 to S1 with bilateral sacroiliac anchors. No acute hardware fracture. Pedicle screw loosening is better demonstrated on CT.    Alignment and vertebral body compression fracture at L1 also better demonstrated on CT.   CT Lumbar Spine w/o & w Contrast    Narrative    EXAM: CT LUMBAR SPINE W/O and W CONTRAST  LOCATION: [facility]  DATE: 2/4/2024    INDICATION: Pain post op surgery  COMPARISON:  CT abdomen pelvis 10/31/2023, lumbar spine CT 10/16/2023, lumbar radiographs 11/08/2023.  CONTRAST: Isovue 370 75 mL  TECHNIQUE: Routine CT Lumbar Spine with IV contrast. Multiplanar reformats. Dose reduction techniques were used.    FINDINGS:  VERTEBRA: Diffuse  demineralization. Postoperative changes from T11-S1 PLIF with right L3-L4 and L4-L5 TLIFs and bilateral sacroiliac anchors. Multilevel laminectomies. No acute hardware fracture. Interval loosening of the bilateral T11 and right T12   pedicle screws. New L1 superior endplate compression fracture with less than 25% vertebral body height loss. Scoliosis. Minimal retrolisthesis of T12 on L1 and minimal anterolisthesis of L1 on L2. Minimal anterolisthesis of L4 on L5.    CANAL/FORAMINA: Not well assessed due to beam hardening from the fusion construct.    PARASPINAL: Postoperative changes of the dorsal paraspinal soft tissues with possible small superficial fluid collection on the right measuring 1.8 x 2.0 cm at L4-L5 level (series 9, image 116).      Impression    IMPRESSION:  1.  Multilevel postoperative changes of the lumbar spine from T11-S1 with interval loosening of the bilateral T11 and right T12 pedicle screws since 10/16/2023.  2.  New L1 superior endplate compression fracture with less than 25% vertebral body height loss since 10/16/2023.  3.  Sterility indeterminate 2 cm superficial fluid collection in the superficial postoperative bed on the right at L4-L5.

## 2024-02-05 NOTE — PROGRESS NOTES
PRIMARY DIAGNOSIS: ACUTE PAIN  OUTPATIENT/OBSERVATION GOALS TO BE MET BEFORE DISCHARGE:  1. Pain Status: Improved-controlled with oral pain medications.    2. Return to near baseline physical activity: No    3. Cleared for discharge by consultants (if involved): N/A    Discharge Planner Nurse   Safe discharge environment identified: Yes  Barriers to discharge: Yes, patient unable to stand due to pain and weakness.        Entered by: Lola Mike RN 02/05/2024 1:41 PM

## 2024-02-05 NOTE — CONSULTS
Care Management Initial Consult    General Information  Assessment completed with: Patient,  Daughter April    Type of CM/SW Visit: Offer D/C Planning    Primary Care Provider verified and updated as needed: Yes   Readmission within the last 30 days:  No        Reason for Consult: discharge planning  Advance Care Planning:          Communication Assessment  Patient's communication style: spoken language (English or Bilingual)    Hearing Difficulty or Deaf: yes   Wear Glasses or Blind: yes    Cognitive  Cognitive/Neuro/Behavioral: WDL  Level of Consciousness: alert, lethargic  Arousal Level: opens eyes spontaneously, arouses to voice  Orientation: oriented x 4  Mood/Behavior: calm, cooperative  Best Language: 0 - No aphasia  Speech: clear, spontaneous, logical    Living Environment:   People in home: alone     Current living Arrangements: assisted living    [facility] of Facility: [facility]     Able to return to prior arrangements: no  Living Arrangement Comments: Assisted Living    Family/Social Support:  Care provided by: self,   homecare agency-[facility]    Provides care for: no one  Marital Status:   Adult children           Description of Support System: Supportive, Involved    Support Assessment: Lacks adequate physical care    Current Resources:   Patient receiving home care services: Yes  Skilled Home Care Services: Skilled Nursing, Home Health Aid, Physical Therapy, Occupational Therapy  Community Resources: Home Care  Equipment currently used at home: walker, rolling, grab bar, toilet, grab bar, tub/shower, shower chair  Supplies currently used at home: None    Employment/Financial:  Employment Status: retired        Financial Concerns: none      Does the patient's insurance plan have a 3 day qualifying hospital stay waiver?  Yes     Which insurance plan 3 day waiver is available? Alternative insurance waiver    Will the waiver be used for post-acute placement? No    Lifestyle &  Psychosocial Needs:  Social Determinants of Health     Food Insecurity: Low Risk  (1/16/2024)    Food Insecurity     Within the past 12 months, did you worry that your food would run out before you got money to buy more?: No     Within the past 12 months, did the food you bought just not last and you didn t have money to get more?: No   Depression: Not at risk (1/16/2024)    PHQ-2     PHQ-2 Score: 0   Housing Stability: Low Risk  (1/16/2024)    Housing Stability     Do you have housing? : Yes     Are you worried about losing your housing?: No   Tobacco Use: Low Risk  (1/16/2024)    Patient History     Smoking Tobacco Use: Never     Smokeless Tobacco Use: Never     Passive Exposure: Not on file   Financial Resource Strain: Low Risk  (1/16/2024)    Financial Resource Strain     Within the past 12 months, have you or your family members you live with been unable to get utilities (heat, electricity) when it was really needed?: No   Alcohol Use: Not on file   Transportation Needs: Low Risk  (1/16/2024)    Transportation Needs     Within the past 12 months, has lack of transportation kept you from medical appointments, getting your medicines, non-medical meetings or appointments, work, or from getting things that you need?: No   Physical Activity: Not on file   Interpersonal Safety: Low Risk  (10/19/2023)    Interpersonal Safety     Do you feel physically and emotionally safe where you currently live?: Yes     Within the past 12 months, have you been hit, slapped, kicked or otherwise physically hurt by someone?: No     Within the past 12 months, have you been humiliated or emotionally abused in other ways by your partner or ex-partner?: No   Stress: Not on file   Social Connections: Not on file       Functional Status:  Prior to admission patient needed assistance:   Dependent ADLs:: Ambulation-walker, Bathing, Toileting  Dependent IADLs:: Cleaning, Cooking, Laundry, Meal Preparation, Medication Management,  "Transportation  Assesssment of Functional Status: Needs placement in a SNF/TCF for rehabilitation    Mental Health Status:  Mental Health Status: No Current Concerns       Chemical Dependency Status:  Chemical Dependency Status: No Current Concerns             Values/Beliefs:  Spiritual, Cultural Beliefs, Yazidi Practices, Values that affect care: no               Additional Information:  Referral received to assist with discharge planning and services.     CM met with the Pt and daughter to introduce self/role, complete assessment and to discuss current recommendations from the therapy dept.     Pt currently resides at the [facility]. Due to the Pt's recent physical status-family recently lowered her care package down because the patient \"was doing so well at home she didn't need all the services.\"     Pt now receives minimal support in the [facility]--Medications, Meals, Showers and housekeeping.   Open to [facility] # [phone] for RN, JUVENALA, PT/OT    Discussed current physical needs and therapy recommendations with the Pt and daughter.   Both agreed and requested CM fax referrals for TCU placement.     Requested #1--[facility].   Daughter states she works at this location. Also states pt was recently there for rehab     --Referral faxed. Awaiting update-as prior auth will need to be obtained.     Family accepting of CM arranging a wheelchair transport. Aware of the private room fee.     PLAN: Pending referral to [facility].   --Awaiting acceptance.     CM to update once a TCU bed has been secured.         GAMALIEL Alejo  [facility] [phone]   [facility]  [phone]      "

## 2024-02-05 NOTE — PROGRESS NOTES
Former[facility]    Medicine Progress Note - Hospitalist Service    Date of Admission:  2/4/2024    Assessment & Plan   Mya Hui is a 83 year old female admitted on 2/4/2024. She presented with acute on chronic back pain.      Acute on chronic lower back pain  Recent Lumbar fusion (10/25/2023)  She presented with new back pain. She has an extensive history of lower back pain and had spinal fusion done on 10/25/2023. She had a complicated course with shock perioperatively from an arhythmia. The CT imaging here revealed some loose pedicle screws and a new compression fracture. Neurosurgery was consulted and graciously reviewed her imaging. They recommended outpatient follow up and pain management however she is reportedly quite sensitive to opioids. She will likely need TCU.  - conservative pain management  - PT/OT  - c/w TLSO brace     Acute respiratory failure  She developed some mild hypoxia requiring 2LNC after she vomited. Atelectasis of RLL on imaging.    - wean oxygen to keep SaO2 greater than 92%  -aggressive pulmonary hygiene     CKD 3  Her creatinine was  at 1.35 on admission, which is much improved from where she was when she was last admitted here. Cr 1.13 this am after hydration overnight.         T2DM.  Blood sugars in the 100's.  insulin sliding scale     Asymptomatic pyuria  She was noted to have pyuria on a routine UA. She is asymptomatic. She was given one dose of cefazolin in the ED.  Admitted physician did not continue this and we will continue to observe off anbx.  Crp negative.  -observe off antibiotics     Hypothyroidism  - continue levothyroxine     Hypertension  - continue diltiazem and hydralazine     Hyperlipidemia  - c/w pravastatin       Observation Goals: -vital signs normal or at patient baseline, -adequate pain control on oral analgesics, -safe disposition plan has been identified, Nurse to notify provider when observation goals have been met and  "patient is ready for discharge.  Diet: Combination Diet Moderate Consistent Carb (60 g CHO per Meal) Diet; Low Saturated Fat Na <2400mg Diet, No Caffeine Diet    DVT Prophylaxis: Enoxaparin (Lovenox) SQ  Spicer Catheter: Not present  Lines: None     Cardiac Monitoring: None  Code Status: No CPR- Do NOT Intubate      Clinically Significant Risk Factors Present on Admission                  # Hypertension: Noted on problem list   # Acute Respiratory Failure: Documented O2 saturation < 91%.  Continue supplemental oxygen as needed     # Obesity: Estimated body mass index is 37.74 kg/m  as calculated from the following:    Height as of this encounter: 1.575 m (5' 2\").    Weight as of this encounter: 93.6 kg (206 lb 5.6 oz).       # Financial/Environmental Concerns:           Disposition Plan      Expected Discharge Date: 02/05/2024                  The patient's care was discussed with the  entire care team .    Princess Peng CNP  Hospitalist Service  [facility]  Securely message with [employer] (more info)  Text page via [employer] Paging/Directory   ______________________________________________________________________    Interval History   No acute events overnight    Physical Exam   Vital Signs: Temp: 97.9  F (36.6  C) Temp src: Oral BP: 134/66 Pulse: 68   Resp: 18 SpO2: 94 % O2 Device: None (Room air) Oxygen Delivery: 1 LPM  Weight: 206 lbs 5.61 oz    Gen:  Lying in bed no acute distress  HEENT:  normocephalic, atraumatic, oropharynx clear  Resp:  CTA posteriorly, normal resp effort  Card:  S1,S2, RRR no murmur, rub or gallop  Abd:  Soft non distended, non tender,  normoactive bowel sounds   Neuro:  Neuro exam grossly non focal      Medical Decision Making       40 MINUTES SPENT BY ME on the date of service doing chart review, history, exam, documentation & further activities per the note.        Data     I have personally reviewed the following data over the past 24 hrs:    7.5  \   9.1 (L)   / " 244     141 106 15.0 /  119 (H)   3.8 22 1.13 (H) \     ALT: 10 AST: 17 AP: 92 TBILI: 0.3   ALB: 4.1 TOT PROTEIN: 7.0 LIPASE: N/A     Procal: N/A CRP: <3.00 Lactic Acid: N/A         Imaging results reviewed over the past 24 hrs:   Recent Results (from the past 24 hour(s))   XR Thoracic Lumbar Spine 2 Views    Narrative    EXAM: XR THORACIC LUMBAR SPINE 2 VIEWS  LOCATION: [facility]  DATE: 2/4/2024    INDICATION: Hx of lumbar surgery, requested per neurosurgery team  COMPARISON:  Same day CT lumbar spine.      Impression    IMPRESSION: Multilevel postoperative from T11 to S1 with bilateral sacroiliac anchors. No acute hardware fracture. Pedicle screw loosening is better demonstrated on CT.    Alignment and vertebral body compression fracture at L1 also better demonstrated on CT.   CT Lumbar Spine w/o & w Contrast    Narrative    EXAM: CT LUMBAR SPINE W/O and W CONTRAST  LOCATION: [facility]  DATE: 2/4/2024    INDICATION: Pain post op surgery  COMPARISON:  CT abdomen pelvis 10/31/2023, lumbar spine CT 10/16/2023, lumbar radiographs 11/08/2023.  CONTRAST: Isovue 370 75 mL  TECHNIQUE: Routine CT Lumbar Spine with IV contrast. Multiplanar reformats. Dose reduction techniques were used.    FINDINGS:  VERTEBRA: Diffuse demineralization. Postoperative changes from T11-S1 PLIF with right L3-L4 and L4-L5 TLIFs and bilateral sacroiliac anchors. Multilevel laminectomies. No acute hardware fracture. Interval loosening of the bilateral T11 and right T12   pedicle screws. New L1 superior endplate compression fracture with less than 25% vertebral body height loss. Scoliosis. Minimal retrolisthesis of T12 on L1 and minimal anterolisthesis of L1 on L2. Minimal anterolisthesis of L4 on L5.    CANAL/FORAMINA: Not well assessed due to beam hardening from the fusion construct.    PARASPINAL: Postoperative changes of the dorsal paraspinal soft tissues with possible small  superficial fluid collection on the right measuring 1.8 x 2.0 cm at L4-L5 level (series 9, image 116).      Impression    IMPRESSION:  1.  Multilevel postoperative changes of the lumbar spine from T11-S1 with interval loosening of the bilateral T11 and right T12 pedicle screws since 10/16/2023.  2.  New L1 superior endplate compression fracture with less than 25% vertebral body height loss since 10/16/2023.  3.  Sterility indeterminate 2 cm superficial fluid collection in the superficial postoperative bed on the right at L4-L5.   XR Chest Port 1 View    Narrative    EXAM: XR CHEST PORT 1 VIEW  LOCATION: [facility]  DATE: 2/5/2024    INDICATION: Breathing difficulty, vomiting  COMPARISON: 10/31/2023      Impression    IMPRESSION: Mild atelectasis in the right lung base. No pleural effusion, confluent consolidation or pneumothorax. Stable cardiac mediastinal contour without pulmonary vascular congestion. A moderate size hiatal hernia is redemonstrated.

## 2024-02-05 NOTE — PROGRESS NOTES
"   02/05/24 1000   Appointment Info   Signing Clinician's Name / Credentials (PT) Yee Echavarria, PT, DPT       Present no   Living Environment   People in Home facility resident   Current Living Arrangements assisted living   Home Accessibility no concerns   Transportation Anticipated agency;health plan transportation   Self-Care   Usual Activity Tolerance moderate   Current Activity Tolerance moderate   Regular Exercise No   Equipment Currently Used at Home walker, rolling  (4WW)   Fall history within last six months no   Activity/Exercise/Self-Care Comment Was going down to dining leal for meals and someone would walk with her.  Help with bathing from the facility.  Family is heling with laundry.  RN helps with medications.   General Information   Onset of Illness/Injury or Date of Surgery 02/04/24   Referring Physician Princess Peng CNP   Patient/Family Therapy Goals Statement (PT) Wants to go back to apartment, but knows she can't get the help she needs there.   Pertinent History of Current Problem (include personal factors and/or comorbidities that impact the POC) Per chart review, \"Mya Hui is a 83 year old female with complex medical history including hypertension, diabetes, hypothyroidism, CKD, chronic low back pain, s/p lumbar fusion who presents for evaluation of right lower back pain.  Patient underwent extensive lower back surgery from T11 through L5 (multilevel fusion, laminectomies/decompression, discectomy) with Dr. Waite on 10/25.  According to the patient and family, this procedure was subsequently complicated by hemorrhagic shock.  Patient had an extended hospitalization and TCU stay before being discharged to an assisted living facility in January.  Since then her back had been doing quite well.  Then starting yesterday morning she developed a new pain to the right paraspinal lumbar region without obvious injury or strain.  This has worsened severely and she is now " "having difficulty bearing weight or walking due to pain.  No radiation of pain and she denies any clear involvement of the hip/leg.\"  Pt lives in [facility] apartment and was receiving home therapy services.   Existing Precautions/Restrictions brace worn when out of bed   Weight-Bearing Status - LUE full weight-bearing   Weight-Bearing Status - RUE full weight-bearing   Weight-Bearing Status - LLE full weight-bearing   Weight-Bearing Status - RLE full weight-bearing   General Observations Resting in bed, IV fluids.   Cognition   Affect/Mental Status (Cognition) WFL   Orientation Status (Cognition) oriented x 4   Follows Commands (Cognition) WFL   Pain Assessment   Patient Currently in Pain   (No pain laying.)   Integumentary/Edema   Integumentary/Edema no deficits were identifed   Posture    Posture Forward head position;Protracted shoulders   Range of Motion (ROM)   ROM Comment L LE AROM is WFL, R LE AROM limited with hip flexion and abduction due to pain.  However functionally able to get more motion.   Strength (Manual Muscle Testing)   Strength Comments L LE WNL, R painful with SLR and heel slides, not as strong.  More deficits of weakness demonstrated once on R LE in WB position.   Bed Mobility   Bed Mobility scooting/bridging;supine-sit;sit-supine   Scooting/Bridging Union (Bed Mobility) minimum assist (75% patient effort)   Supine-Sit Union (Bed Mobility) maximum assist (25% patient effort);1 person assist   Sit-Supine Union (Bed Mobility) moderate assist (50% patient effort);1 person assist   Impairments Contributing to Impaired Bed Mobility pain;decreased strength   Transfers   Transfers sit-stand transfer   Impairments Contributing to Impaired Transfers pain;decreased strength   Comment, (Transfers) Prior to sit to stand pt was able to don her TLSO with some reinstruction/guidance.   Sit-Stand Transfer   Sit-Stand Union (Transfers) minimum assist (75% patient effort);1 person assist "   Assistive Device (Sit-Stand Transfers) walker, front-wheeled   Gait/Stairs (Locomotion)   Comment, (Gait/Stairs) Attempted marching in place x 3 each side, but R leg wanted to give out on her when she attempted to lift L side due to pain and weakness.  Able to perform 4 side steps with FWW and Loren x 1 to her R to get ready to sit back down on the bed.   Balance   Balance no deficits were identified   Sensory Examination   Sensory Perception WFL   Coordination   Coordination no deficits were identified   Muscle Tone   Muscle Tone no deficits were identified   Clinical Impression   Criteria for Skilled Therapeutic Intervention Yes, treatment indicated   PT Diagnosis (PT) Weakness, pain   Influenced by the following impairments Pain, weakness   Functional limitations due to impairments Bed  mobility, transfers, and ambulation   Clinical Presentation (PT Evaluation Complexity) evolving   Clinical Presentation Rationale Clinical assessment   Clinical Decision Making (Complexity) moderate complexity   Planned Therapy Interventions (PT) bed mobility training;gait training;neuromuscular re-education;patient/family education;strengthening;transfer training   Risk & Benefits of therapy have been explained evaluation/treatment results reviewed;care plan/treatment goals reviewed;participants included;patient;participants voiced agreement with care plan   Clinical Impression Comments Pt is an 83 year old female with complaints of R back pain and leg weakness.  She has history of back surgery in October 2023.  Went to TCU and then discharged to [facility] with  services.  A couple days ago pt started having R trish back pain and R leg weakness. Pt is stating it is very painful and weak to put any weight on the R LE.  Per chart pt is to continue with TLSO.  Pt is demonstrating below her baseline level of function.  She is requiring assistance for bed mobility and transfers.  She is weak when taking steps with the legs and requring  assistance with moving.  PT recommending pt discharge to TCU for further rehab needs.   PT Total Evaluation Time   PT Eval, Moderate Complexity Minutes (09720) 25   Physical Therapy Goals   PT Frequency 5x/week   PT Predicted Duration/Target Date for Goal Attainment 02/09/24   PT Goals Bed Mobility;Transfers;Gait   PT: Bed Mobility Supervision/stand-by assist;Supine to/from sit   PT: Transfers Supervision/stand-by assist;Sit to/from stand;Bed to/from chair;Assistive device   PT: Gait Supervision/stand-by assist;Rolling walker;50 feet   PT Discharge Planning   PT Plan 1/5 MON, Transfers, ambulation   PT Discharge Recommendation (DC Rec) Transitional Care Facility   PT Rationale for DC Rec Pt is below baseline level of function.  Requiring assistance with bed mobility and transfers.  MARIA DEL CARMEN TUBBS is weak and wanting to give out on her when she tries to pull full weight on the leg.  PT is recommending TCU for further strength, balance, and mobility.   PT Brief overview of current status MaxA x 1 supine to sit, modA x 1 of sit to supine.  Joe x 1 for sit to stand, and CGA for side stepping and marching in place with FWW.   Total Session Time   Total Session Time (sum of timed and untimed services) 25     Thank you for your referral.    Yee Echavarria, PT, DPT  [facility]  [phone]    [facility]  OUTPATIENT PHYSICAL THERAPY EVALUATION  PLAN OF TREATMENT FOR OUTPATIENT REHABILITATION  (COMPLETE FOR INITIAL CLAIMS ONLY)  Patient's Last Name, First Name, M.I.  YOB: 1940  Mya Hui                        Provider's Name  [facility] Medical Record No.  [id number]                             Onset Date:  02/04/24   Start of Care Date:  02/05/24   Type:     _X_PT   ___OT   ___SLP Medical Diagnosis:  Acute back pain              PT Diagnosis:  Weakness, pain Visits from SOC:  1     See note for plan of treatment,  functional goals and certification details    Date Range: 2/5/2024-2/6/2024    I CERTIFY THE NEED FOR THESE SERVICES FURNISHED UNDER        THIS PLAN OF TREATMENT AND WHILE UNDER MY CARE     (Physician co-signature of this document indicates review and certification of the therapy plan).

## 2024-02-06 ENCOUNTER — APPOINTMENT (OUTPATIENT)
Dept: OCCUPATIONAL THERAPY | Facility: CLINIC | Age: 84
End: 2024-02-06
Payer: COMMERCIAL

## 2024-02-06 ENCOUNTER — DOCUMENTATION ONLY (OUTPATIENT)
Dept: GERIATRICS | Facility: CLINIC | Age: 84
End: 2024-02-06

## 2024-02-06 ENCOUNTER — APPOINTMENT (OUTPATIENT)
Dept: PHYSICAL THERAPY | Facility: CLINIC | Age: 84
End: 2024-02-06
Payer: COMMERCIAL

## 2024-02-06 ENCOUNTER — LAB REQUISITION (OUTPATIENT)
Dept: LAB | Facility: CLINIC | Age: 84
End: 2024-02-06

## 2024-02-06 VITALS
BODY MASS INDEX: 37.97 KG/M2 | DIASTOLIC BLOOD PRESSURE: 70 MMHG | RESPIRATION RATE: 18 BRPM | OXYGEN SATURATION: 96 % | HEIGHT: 62 IN | TEMPERATURE: 97.9 F | SYSTOLIC BLOOD PRESSURE: 137 MMHG | HEART RATE: 71 BPM | WEIGHT: 206.35 LBS

## 2024-02-06 DIAGNOSIS — Z11.1 ENCOUNTER FOR SCREENING FOR RESPIRATORY TUBERCULOSIS: ICD-10-CM

## 2024-02-06 LAB
ANION GAP SERPL CALCULATED.3IONS-SCNC: 11 MMOL/L (ref 7–15)
BUN SERPL-MCNC: 20.1 MG/DL (ref 8–23)
CALCIUM SERPL-MCNC: 8.7 MG/DL (ref 8.8–10.2)
CHLORIDE SERPL-SCNC: 109 MMOL/L (ref 98–107)
CREAT SERPL-MCNC: 1.37 MG/DL (ref 0.51–0.95)
DEPRECATED HCO3 PLAS-SCNC: 23 MMOL/L (ref 22–29)
EGFRCR SERPLBLD CKD-EPI 2021: 39 ML/MIN/1.73M2
ERYTHROCYTE [DISTWIDTH] IN BLOOD BY AUTOMATED COUNT: 13.9 % (ref 10–15)
GLUCOSE BLDC GLUCOMTR-MCNC: 120 MG/DL (ref 70–99)
GLUCOSE BLDC GLUCOMTR-MCNC: 121 MG/DL (ref 70–99)
GLUCOSE BLDC GLUCOMTR-MCNC: 148 MG/DL (ref 70–99)
GLUCOSE SERPL-MCNC: 124 MG/DL (ref 70–99)
HCT VFR BLD AUTO: 28.2 % (ref 35–47)
HGB BLD-MCNC: 8.4 G/DL (ref 11.7–15.7)
HGB BLD-MCNC: 9.2 G/DL (ref 11.7–15.7)
MCH RBC QN AUTO: 26.8 PG (ref 26.5–33)
MCHC RBC AUTO-ENTMCNC: 29.8 G/DL (ref 31.5–36.5)
MCV RBC AUTO: 90 FL (ref 78–100)
PLATELET # BLD AUTO: 233 10E3/UL (ref 150–450)
POTASSIUM SERPL-SCNC: 3.9 MMOL/L (ref 3.4–5.3)
RBC # BLD AUTO: 3.14 10E6/UL (ref 3.8–5.2)
SODIUM SERPL-SCNC: 143 MMOL/L (ref 135–145)
WBC # BLD AUTO: 5.5 10E3/UL (ref 4–11)

## 2024-02-06 PROCEDURE — 96372 THER/PROPH/DIAG INJ SC/IM: CPT | Performed by: INTERNAL MEDICINE

## 2024-02-06 PROCEDURE — 85018 HEMOGLOBIN: CPT | Performed by: NURSE PRACTITIONER

## 2024-02-06 PROCEDURE — 82962 GLUCOSE BLOOD TEST: CPT

## 2024-02-06 PROCEDURE — 97110 THERAPEUTIC EXERCISES: CPT | Mod: GP | Performed by: PHYSICAL THERAPIST

## 2024-02-06 PROCEDURE — 97530 THERAPEUTIC ACTIVITIES: CPT | Mod: GP | Performed by: PHYSICAL THERAPIST

## 2024-02-06 PROCEDURE — 36415 COLL VENOUS BLD VENIPUNCTURE: CPT | Performed by: NURSE PRACTITIONER

## 2024-02-06 PROCEDURE — 99239 HOSP IP/OBS DSCHRG MGMT >30: CPT | Performed by: NURSE PRACTITIONER

## 2024-02-06 PROCEDURE — 250N000013 HC RX MED GY IP 250 OP 250 PS 637: Performed by: INTERNAL MEDICINE

## 2024-02-06 PROCEDURE — 85027 COMPLETE CBC AUTOMATED: CPT | Performed by: NURSE PRACTITIONER

## 2024-02-06 PROCEDURE — 97535 SELF CARE MNGMENT TRAINING: CPT | Mod: GO | Performed by: SPECIALIST/TECHNOLOGIST

## 2024-02-06 PROCEDURE — G0378 HOSPITAL OBSERVATION PER HR: HCPCS

## 2024-02-06 PROCEDURE — 80048 BASIC METABOLIC PNL TOTAL CA: CPT | Performed by: NURSE PRACTITIONER

## 2024-02-06 PROCEDURE — 97110 THERAPEUTIC EXERCISES: CPT | Mod: GO | Performed by: SPECIALIST/TECHNOLOGIST

## 2024-02-06 PROCEDURE — 96361 HYDRATE IV INFUSION ADD-ON: CPT

## 2024-02-06 PROCEDURE — 250N000011 HC RX IP 250 OP 636: Performed by: INTERNAL MEDICINE

## 2024-02-06 RX ORDER — CYCLOBENZAPRINE HCL 10 MG
10 TABLET ORAL 3 TIMES DAILY
DISCHARGE
Start: 2024-02-06 | End: 2024-02-11

## 2024-02-06 RX ORDER — HYDRALAZINE HYDROCHLORIDE 10 MG/1
10 TABLET, FILM COATED ORAL EVERY 8 HOURS
Status: DISCONTINUED | OUTPATIENT
Start: 2024-02-06 | End: 2024-02-06

## 2024-02-06 RX ORDER — LIDOCAINE 4 G/G
1 PATCH TOPICAL EVERY 24 HOURS
DISCHARGE
Start: 2024-02-06 | End: 2024-05-22

## 2024-02-06 RX ADMIN — DILTIAZEM HYDROCHLORIDE 180 MG: 180 CAPSULE, COATED, EXTENDED RELEASE ORAL at 08:12

## 2024-02-06 RX ADMIN — LEVOTHYROXINE SODIUM 100 MCG: 100 TABLET ORAL at 08:12

## 2024-02-06 RX ADMIN — CHOLESTYRAMINE 4 G: 4 POWDER, FOR SUSPENSION ORAL at 08:12

## 2024-02-06 RX ADMIN — SODIUM CHLORIDE, SODIUM LACTATE, POTASSIUM CHLORIDE, CALCIUM CHLORIDE AND DEXTROSE MONOHYDRATE: 5; 600; 310; 30; 20 INJECTION, SOLUTION INTRAVENOUS at 05:49

## 2024-02-06 RX ADMIN — ACETAMINOPHEN 650 MG: 325 TABLET, FILM COATED ORAL at 09:54

## 2024-02-06 RX ADMIN — SENNOSIDES AND DOCUSATE SODIUM 1 TABLET: 8.6; 5 TABLET ORAL at 13:27

## 2024-02-06 RX ADMIN — PANTOPRAZOLE SODIUM 40 MG: 40 TABLET, DELAYED RELEASE ORAL at 08:12

## 2024-02-06 RX ADMIN — CYCLOBENZAPRINE 10 MG: 10 TABLET, FILM COATED ORAL at 13:34

## 2024-02-06 RX ADMIN — CYCLOBENZAPRINE 10 MG: 10 TABLET, FILM COATED ORAL at 08:12

## 2024-02-06 RX ADMIN — ENOXAPARIN SODIUM 40 MG: 40 INJECTION SUBCUTANEOUS at 08:12

## 2024-02-06 NOTE — PLAN OF CARE
Physical Therapy Discharge Summary    Reason for therapy discharge:    Discharged to transitional care facility.    Progress towards therapy goal(s). See goals on Care Plan in [facility] electronic health record for goal details.  Goals not met.  Barriers to achieving goals:   discharge from facility.    Therapy recommendation(s):    Continued therapy is recommended.  Rationale/Recommendations:  strength, mobility, and function.    Thank you for your referral.    Yee Echavarria, PT, DPT  [facility]  [phone]

## 2024-02-06 NOTE — TELEPHONE ENCOUNTER
[facility] Call Center    Phone Message    May a detailed message be left on voicemail: yes     Reason for Call: Pt daughter calling back, had to cancel today's appt as pt is currently in the hospital. Writer unable to generate provider's schedule.  Please call pt/daughter to reschedule  Thank you    Action Taken: Message routed to:  [facility] neph    Travel Screening: Not Applicable

## 2024-02-06 NOTE — TELEPHONE ENCOUNTER
"Called and spoke with pt's Daughter \"April\" (Auth to Discuss in chart).  Offered to reschedule appt with Dr. Kilpatrick:  Video Visit 2/27/24 at 11:30am.  April agreed with a Video Visit,date and time.  Appt scheduled.    Vanessa Sosa LPN    "

## 2024-02-06 NOTE — PROGRESS NOTES
Care Management Discharge Note    Discharge Date: 02/06/24     Discharge Disposition: Transitional Care-[facility]     Had been living in the [facility]:  [facility]   [address] [place] [address],  [place],   # [phone]      Discharge Services: RN, PT/OT    Discharge DME: Wheelchair    Discharge Transportation: agency--[employer]     Private pay costs discussed: transportation costs    Does the patient's insurance plan have a 3 day qualifying hospital stay waiver?  Yes     Which insurance plan 3 day waiver is available? Alternative insurance waiver    Will the waiver be used for post-acute placement? Yes    PAS Confirmation Code:  [id number]  Patient/family educated on Medicare website which has current facility and service quality ratings: yes    Education Provided on the Discharge Plan: Yes  Persons Notified of Discharge Plans: Patient, Daughter April   Patient/Family in Agreement with the Plan: yes    Handoff Referral Completed: Yes    Additional Information:  Update received during IDT rounds. Informed Pt is medically stable for discharge today.     SARY spoke with [facility]'s Director. Confirmed they have a bed available today. Malcolm will pursue prior auth through [employer]. Anticipated receiving approval this afternoon.       CM placed call to the RN at the Pt's longterm to inform of discharge plan.   RN verbalized approval with plan. Aware of TCU placement needs.     [facility]   # [phone]    [facility] # [phone] - also aware of discharge plan    Call placed to the Pt's daughter April to discuss the acceptance to [facility].   Daughter verbalized being pleased with the plan.   Also states they are looking forward to their Neurosurgery appointment on Thursday which they are thinking will give them more answers to the Pt's situation.     Daughter requested CM arrange a wheelchair ride with [employer]. States she has an account set up with  them.     Tentative Transport Ride scheduled for 3:30pm today.       PLAN: Discharge to [facility]  Main Phone: [phone]   Admissions Phone: [phone]   Fax: [phone]     Handoff to clinic care coordination completed       GAMALIEL Alejo  [facility] [phone]   [facility]  [phone]

## 2024-02-06 NOTE — PROGRESS NOTES
PRIMARY DIAGNOSIS: ACUTE PAIN  OUTPATIENT/OBSERVATION GOALS TO BE MET BEFORE DISCHARGE:  1. Pain Status: Improved-controlled with oral pain medications.    2. Return to near baseline physical activity: No    3. Cleared for discharge by consultants (if involved): No    Pt A&Ox4. Oxygen sats down to 86% on room air, applied O2 at 2L. Is noted to be a mouth breather at times and oxygen drops when asleep.  Blood sugars 151 and 120. Slept well between cares.     Discharge Planner Nurse   Safe discharge environment identified: Yes  Barriers to discharge: Yes       Entered by: Aisha Zhang RN 02/06/2024 4:39 AM     Please review provider order for any additional goals.   Nurse to notify provider when observation goals have been met and patient is ready for discharge.

## 2024-02-06 NOTE — PROGRESS NOTES
PRIMARY DIAGNOSIS: ACUTE PAIN  OUTPATIENT/OBSERVATION GOALS TO BE MET BEFORE DISCHARGE:  1. Pain Status: Improved-controlled with oral pain medications. Less pain complaints when laying in bed.     2. Return to near baseline physical activity: No Pt has significant pain with weight bearing.     3. Cleared for discharge by consultants (if involved): No    Discharge Planner Nurse   Safe discharge environment identified: Yes  Barriers to discharge: Yes       Entered by: Aisha Zhang RN 02/06/2024 4:34 AM     Please review provider order for any additional goals.   Nurse to notify provider when observation goals have been met and patient is ready for discharge.

## 2024-02-06 NOTE — TELEPHONE ENCOUNTER
Lab results received from [facility] dated 1/30/24.  Reviewed by BALAJI Nazario Nephrology Care Coordinator.  Copy sent to [employer] Abstract Team to be entered in pts chart.    Vanessa Sosa LPN

## 2024-02-06 NOTE — DISCHARGE SUMMARY
"[facility]  Hospitalist Discharge Summary      Date of Admission:  2/4/2024  Date of Discharge:  2/6/2024  Discharging Provider: Mansi Torres CNP  Discharge Service: Hospitalist Service    Discharge Diagnoses   Acute on chronic lower back pain  Recent lumbar fusion  Acute respiratory failure  CKD stage III  Type 2 diabetes  Asymptomatic pyuria  Hypothyroidism  Hypertension  Hyperlipidemia    Clinically Significant Risk Factors     # Obesity: Estimated body mass index is 37.74 kg/m  as calculated from the following:    Height as of this encounter: 1.575 m (5' 2\").    Weight as of this encounter: 93.6 kg (206 lb 5.6 oz).       Follow-ups Needed After Discharge    Follow up with long term physician.  The following labs/tests are   recommended: Follow-up basic metabolic panel and hemoglobin in 3 to 5   days.    Follow up with neurosurgery as scheduled     Unresulted Labs Ordered in the Past 30 Days of this Admission       No orders found from 1/5/2024 to 2/5/2024.        These results will be followed up by NA    Discharge Disposition   Discharged to short-term care facility  Condition at discharge: Stable    Hospital Course   Mya Hui is a 83 year old female admitted on 2/4/2024. She presented with acute on chronic back pain.      Acute on chronic lower back pain  Recent Lumbar fusion (10/25/2023)  She presented with new back pain. She has an extensive history of lower back pain and had spinal fusion done on 10/25/2023. She had a complicated course with shock perioperatively from an arhythmia. The CT imaging here revealed some loose pedicle screws and a new compression fracture. Neurosurgery was consulted and graciously reviewed her imaging. They recommended outpatient follow up and pain management however she is reportedly quite sensitive to opioids.   Patient's pain has been managed with Tylenol, Flexeril and lidocaine patch.  She is wearing TLSO brace.  -Plan to discharge to " transitional care unit  -Continue pain management with Tylenol as needed, Flexeril 3 times daily for 5 more days, and lidocaine patch.  - PT/OT eval and treat  - c/w TLSO brace  -Follow-up with neurosurgery as scheduled     Acute respiratory failure  She developed some mild hypoxia requiring 2LNC after she vomited. Atelectasis of RLL on imaging.  This morning patient is on room air.  She did need supplemental oxygen during the night  -Will discharge with supplemental oxygen as needed to keep oxygen saturations greater than 90%  -Incentive spirometer every 2 hours while awake     CKD 3  Her creatinine was  at 1.35 on admission, which is much improved from where she was when she was last admitted here.  Creat 1.37 today  -follow basic metabolic panel in 3 to 5 days       T2DM.    Blood sugars in the 100's.  Currently not on any treatment       Asymptomatic pyuria  She was noted to have pyuria on a routine UA. She is asymptomatic. She was given one dose of cefazolin in the ED.  Admitted physician did not continue this and we continued to observe off anbx.  Crp within normal limits.  No signs of acute infection.       Hypothyroidism  - continue levothyroxine     Hypertension  - continue diltiazem     Hyperlipidemia  - c/w pravastatin    Consultations This Hospital Stay   PHYSICAL THERAPY ADULT IP CONSULT  CARE MANAGEMENT / SOCIAL WORK IP CONSULT  OCCUPATIONAL THERAPY ADULT IP CONSULT  PHYSICAL THERAPY ADULT IP CONSULT  OCCUPATIONAL THERAPY ADULT IP CONSULT    Code Status   No CPR- Do NOT Intubate    Time Spent on this Encounter   IMansi CNP, personally saw the patient today and spent greater than 30 minutes discharging this patient.       Mansi Torres CNP  [facility] 2A MEDICAL SURGICAL  [address] [place] [address]  Phone: [phone]  ______________________________________________________________________    Physical Exam   Vital Signs: Temp: 97.9  F (36.6  C) Temp src: Oral BP:  137/70 Pulse: 71   Resp: 18 SpO2: 96 % O2 Device: None (Room air) Oxygen Delivery: 1.5 LPM  Weight: 206 lbs 5.61 oz    Gen: Being up in chair, no acute distress  HEENT:  normocephalic, atraumatic, oropharynx clear  Resp:  CTA posteriorly, normal resp effort  Card:  S1,S2, RRR no murmur, rub or gallop  Abd:  Soft non distended, non tender,  normoactive bowel sounds   Neuro:  Neuro exam grossly non focal       Primary Care Physician   Kisha Stinson    Discharge Orders      Adult Neurosurgery  Referral      Primary Care - Care Coordination Referral      General info for SNF    Length of Stay Estimate: Short Term Care: Estimated # of Days <30  Condition at Discharge: Improving  Level of care:skilled   Rehabilitation Potential: Good  Admission H&P remains valid and up-to-date: Yes  Recent Chemotherapy: N/A  Use Nursing Home Standing Orders: Yes     Mantoux instructions    Give two-step Mantoux (PPD) Per Facility Policy Yes     Follow Up and recommended labs and tests    Follow up with half-way physician.  The following labs/tests are recommended: Follow-up basic metabolic panel and hemoglobin in 3 to 5 days.     Reason for your hospital stay    You were admitted to the hospital with acute lower back pain.  You were found to have a compression fracture.  You are to follow-up with neurosurgery.     Activity - Up with assistive device    TSLO brace on when out of bed.     Additional Discharge Instructions    Incentive spirometer every 2 hours while awake     Physical Therapy Adult Consult    Evaluate and treat as clinically indicated.    Reason: Weakness, decreased mobility due to compression fracture     Occupational Therapy Adult Consult    Evaluate and treat as clinically indicated.    Reason: Weakness, decreased ability to perform ADLs due to compression fracture     Oxygen (SNF/TCU) Discharge     Fall precautions     Diet    Follow this diet upon discharge:Combination Diet Moderate  Consistent Carb (60 g CHO per Meal) Diet; Low Saturated Fat Na <2400mg Diet,       Significant Results and Procedures   Most Recent 3 CBC's:  Recent Labs   Lab Test 02/06/24  0451 02/05/24  0516 02/04/24  1715   WBC 5.5 7.5 6.6   HGB 8.4* 9.1* 9.3*   MCV 90 88 88    244 241     Most Recent 3 BMP's:  Recent Labs   Lab Test 02/06/24  1155 02/06/24  0744 02/06/24  0451 02/05/24  0739 02/05/24  0516 02/04/24  2245 02/04/24  1715   NA  --   --  143  --  141  --  140   POTASSIUM  --   --  3.9  --  3.8  --  4.1   CHLORIDE  --   --  109*  --  106  --  107   CO2  --   --  23  --  22  --  22   BUN  --   --  20.1  --  15.0  --  18.8   CR  --   --  1.37*  --  1.13*  --  1.35*   ANIONGAP  --   --  11  --  13  --  11   TERESA  --   --  8.7*  --  9.0  --  9.2   * 121* 124*   < > 150*   < > 109*    < > = values in this interval not displayed.   ,   Results for orders placed or performed during the hospital encounter of 02/04/24   CT Lumbar Spine w/o & w Contrast    Narrative    EXAM: CT LUMBAR SPINE W/O and W CONTRAST  LOCATION: [facility]  DATE: 2/4/2024    INDICATION: Pain post op surgery  COMPARISON:  CT abdomen pelvis 10/31/2023, lumbar spine CT 10/16/2023, lumbar radiographs 11/08/2023.  CONTRAST: Isovue 370 75 mL  TECHNIQUE: Routine CT Lumbar Spine with IV contrast. Multiplanar reformats. Dose reduction techniques were used.    FINDINGS:  VERTEBRA: Diffuse demineralization. Postoperative changes from T11-S1 PLIF with right L3-L4 and L4-L5 TLIFs and bilateral sacroiliac anchors. Multilevel laminectomies. No acute hardware fracture. Interval loosening of the bilateral T11 and right T12   pedicle screws. New L1 superior endplate compression fracture with less than 25% vertebral body height loss. Scoliosis. Minimal retrolisthesis of T12 on L1 and minimal anterolisthesis of L1 on L2. Minimal anterolisthesis of L4 on L5.    CANAL/FORAMINA: Not well assessed due to beam hardening from the  fusion construct.    PARASPINAL: Postoperative changes of the dorsal paraspinal soft tissues with possible small superficial fluid collection on the right measuring 1.8 x 2.0 cm at L4-L5 level (series 9, image 116).      Impression    IMPRESSION:  1.  Multilevel postoperative changes of the lumbar spine from T11-S1 with interval loosening of the bilateral T11 and right T12 pedicle screws since 10/16/2023.  2.  New L1 superior endplate compression fracture with less than 25% vertebral body height loss since 10/16/2023.  3.  Sterility indeterminate 2 cm superficial fluid collection in the superficial postoperative bed on the right at L4-L5.   XR Thoracic Lumbar Spine 2 Views    Narrative    EXAM: XR THORACIC LUMBAR SPINE 2 VIEWS  LOCATION: [facility]  DATE: 2/4/2024    INDICATION: Hx of lumbar surgery, requested per neurosurgery team  COMPARISON:  Same day CT lumbar spine.      Impression    IMPRESSION: Multilevel postoperative from T11 to S1 with bilateral sacroiliac anchors. No acute hardware fracture. Pedicle screw loosening is better demonstrated on CT.    Alignment and vertebral body compression fracture at L1 also better demonstrated on CT.   XR Chest Port 1 View    Narrative    EXAM: XR CHEST PORT 1 VIEW  LOCATION: [facility]  DATE: 2/5/2024    INDICATION: Breathing difficulty, vomiting  COMPARISON: 10/31/2023      Impression    IMPRESSION: Mild atelectasis in the right lung base. No pleural effusion, confluent consolidation or pneumothorax. Stable cardiac mediastinal contour without pulmonary vascular congestion. A moderate size hiatal hernia is redemonstrated.       Discharge Medications   Current Discharge Medication List        START taking these medications    Details   cyclobenzaprine (FLEXERIL) 10 MG tablet Take 1 tablet (10 mg) by mouth 3 times daily for 5 days    Associated Diagnoses: Acute low back pain, unspecified back pain laterality,  unspecified whether sciatica present      Lidocaine (LIDOCARE) 4 % Patch Place 1 patch onto the skin every 24 hours To prevent lidocaine toxicity, patient should be patch free for 12 hrs daily.    Associated Diagnoses: Acute low back pain, unspecified back pain laterality, unspecified whether sciatica present           CONTINUE these medications which have NOT CHANGED    Details   acetaminophen (TYLENOL) 500 MG tablet Take 1,000 mg by mouth every 6 hours as needed for mild pain      cholestyramine (QUESTRAN) 4 g packet DISSOLVE AND TAKE ONE PACKET BY MOUTH TWICE A DAY  Qty: 60 packet, Refills: 5    Associated Diagnoses: Hyperlipidemia with target LDL less than 100      diltiazem ER COATED BEADS (CARDIZEM CD/CARTIA XT) 180 MG 24 hr capsule Take 1 capsule (180 mg) by mouth daily  Qty: 30 capsule, Refills: 1    Associated Diagnoses: Essential hypertension      levothyroxine (SYNTHROID/LEVOTHROID) 100 MCG tablet Take 1 tablet (100 mcg) by mouth daily  Qty: 90 tablet, Refills: 3    Associated Diagnoses: Hypothyroidism, unspecified type      oxyBUTYnin ER (DITROPAN XL) 5 MG 24 hr tablet Take 1 tablet (5 mg) by mouth daily  Qty: 30 tablet, Refills: 0    Associated Diagnoses: OAB (overactive bladder)      pantoprazole (PROTONIX) 40 MG EC tablet Take 1 tablet (40 mg) by mouth daily  Qty: 30 tablet, Refills: 1    Associated Diagnoses: Gastroesophageal reflux disease without esophagitis      polyethylene glycol (MIRALAX) 17 g packet Take 17 g by mouth daily Hold for loose stools    Associated Diagnoses: S/P lumbar spinal fusion      pravastatin (PRAVACHOL) 40 MG tablet Take 1 tablet (40 mg) by mouth daily  Qty: 90 tablet, Refills: 3    Associated Diagnoses: Hyperlipidemia with target LDL less than 100      blood glucose (ACCU-CHEK GUIDE) test strip Use to test blood sugar 1 times daily or as directed.  Qty: 100 strip, Refills: 11    Comments: ### DO NOT FILL NOW.  Please update patient's profile to reflect additional refills.   ####  Associated Diagnoses: Diabetes mellitus with stage 3 chronic kidney disease (H)      blood glucose monitoring (ACCU-CHEK FASTCLIX) lancets USE TO TEST BLOOD SUGARS ONCE DAILY  Qty: 100 each, Refills: 11    Associated Diagnoses: Type 2 diabetes mellitus without complication, without long-term current use of insulin (H)      Incontinence Supply Disposable (PROTECTIVE UNDERWEAR SUPER LG) MISC 3 each daily  Qty: 90 each, Refills: 11    Associated Diagnoses: OAB (overactive bladder)      order for DME Equipment being ordered: [employer] shoes and insoles  Qty: 2 Units, Refills: 1    Associated Diagnoses: Type 2 diabetes mellitus without complication, without long-term current use of insulin (H)           STOP taking these medications       hydrALAZINE (APRESOLINE) 10 MG tablet Comments:   Reason for Stopping:             Allergies   Allergies   Allergen Reactions    Fentanyl Nausea and Vomiting     VERY sensitive to most narcotics if not all.    Morphine And Related Nausea

## 2024-02-06 NOTE — PLAN OF CARE
Occupational Therapy Discharge Summary    Reason for therapy discharge:    Discharged to transitional care facility.    Progress towards therapy goal(s). See goals on Care Plan in [facility] electronic health record for goal details.  Goals partially met.  Barriers to achieving goals:   discharge from facility.    Therapy recommendation(s):    Continued therapy is recommended.  Rationale/Recommendations:  Continued strengthening, progression of ADLs and related mobility.      Thank you for your referral.  Lola Montiel OTR/KALIA     [facility]  O: [phone]  E: [email or web address]

## 2024-02-06 NOTE — PROGRESS NOTES
S-(situation): [facility]    B-(background): Admitted d/t Acute on chronic lower back pain  Recent Lumbar fusion (10/25/2023)     A-(assessment): A and Ox4. Complained of pain while moving to chair. PRN Tylenol given and was effective. Patient ambulated in room with back brace Assist of 1 with walker.  Last bowel movement: 2/3/24     R-(recommendations):Report tried to [facility] but [facility] answered. Writer left a voice message to call back. Listed belongings gathered and sent with patient.     Discharge Nursing Criteria:     Care Plan and Patient education resolved: Yes    Vaccines  Influenza status verified at discharge:  Yes    Intentionally Retained Items No    MISC  Home medications returned to patient: NA  Medication Bin checked and emptied on discharge Yes  All paperwork sent with patient/Copy of AVS given to patient or family Yes.  Given to Willy

## 2024-02-07 ENCOUNTER — OFFICE VISIT (OUTPATIENT)
Dept: AUDIOLOGY | Facility: OTHER | Age: 84
End: 2024-02-07
Payer: COMMERCIAL

## 2024-02-07 ENCOUNTER — TELEPHONE (OUTPATIENT)
Dept: AUDIOLOGY | Facility: OTHER | Age: 84
End: 2024-02-07

## 2024-02-07 ENCOUNTER — TELEPHONE (OUTPATIENT)
Dept: FAMILY MEDICINE | Facility: OTHER | Age: 84
End: 2024-02-07

## 2024-02-07 ENCOUNTER — PATIENT OUTREACH (OUTPATIENT)
Dept: CARE COORDINATION | Facility: CLINIC | Age: 84
End: 2024-02-07

## 2024-02-07 ENCOUNTER — TELEPHONE (OUTPATIENT)
Dept: NEUROSURGERY | Facility: CLINIC | Age: 84
End: 2024-02-07

## 2024-02-07 DIAGNOSIS — H90.3 SENSORINEURAL HEARING LOSS, BILATERAL: Primary | ICD-10-CM

## 2024-02-07 PROCEDURE — P9604 ONE-WAY ALLOW PRORATED TRIP: HCPCS | Performed by: FAMILY MEDICINE

## 2024-02-07 PROCEDURE — 86481 TB AG RESPONSE T-CELL SUSP: CPT | Performed by: FAMILY MEDICINE

## 2024-02-07 PROCEDURE — 36415 COLL VENOUS BLD VENIPUNCTURE: CPT | Performed by: FAMILY MEDICINE

## 2024-02-07 PROCEDURE — V5014 HEARING AID REPAIR/MODIFYING: HCPCS | Performed by: AUDIOLOGIST

## 2024-02-07 ASSESSMENT — ACTIVITIES OF DAILY LIVING (ADL): DEPENDENT_IADLS:: CLEANING;COOKING;LAUNDRY;MEAL PREPARATION;MEDICATION MANAGEMENT;TRANSPORTATION

## 2024-02-07 NOTE — PROGRESS NOTES
HEARING AID DROP-OFF    Patient Name:  Mya Hui    Patient Age:   83 year old    :  1940    Background:   The patient dropped off her left hearing aid because it was not working.     SIDE: Left   MAKE: Phonak   MODEL: Tim V30-M   S/N: [id number]   WARRANTY:     Procedures:   A listening check revealed no sound with a new battery. Sound improved when the slim tube was removed, and it was noted to be plugged. The hearing aid was cleaned and checked and the microphones were suctioned. The slim tube was replaced (length 2) from stock and the earmold was cleaned and reattached. A listening check revealed good sound after cleaning.    Plan:   The patient's daughter will be called and informed that the hearing aid is ready to .    CHARGES  [id number] In-office repair/clean and check      Corbin Mooney, CCC-A  Licensed Audiologist  2024

## 2024-02-07 NOTE — LETTER
[facility]   To:             Please give to facility    From:   Susy Key  RN  Care Coordinator   [facility]   P: [phone]  [email or web address]   Patient Name:  Mya Hui YOB: 1940   Admit date: 2/6/2024      *Information Needed:  Please contact me when the patient will discharge (or if they will move to long term care)- include the discharge date, disposition, and main diagnosis   If the patient is discharged with home care services, please provide the name of the agency    Also- Please inform me if a care conference is being held.   Phone, Fax or Email with information                        Thank you

## 2024-02-07 NOTE — TELEPHONE ENCOUNTER
INCOMING FORMS    Sender: Lali    Type of Form, letter or note (What is requested?): orders    How was the form received?: Fax    How should forms be returned?:  Fax : [phone]    Form placed in JR bin for review/signature if appropriate.

## 2024-02-07 NOTE — TELEPHONE ENCOUNTER
Called patient's daughter and left a non-detailed message requesting a call back. The hearing aid just needed a good cleaning and is working again. It is ready to  at the [facility] . There is a $30 charge for the clean and check.    Corbin Mooney, CCC-A  [place] Licensed Audiologist #[id number]  2/7/2024

## 2024-02-07 NOTE — PROGRESS NOTES
Clinic Care Coordination Contact  Care Coordination Transition Communication    Clinical Data: Patient was hospitalized at [facility] from 2/4/2024 to 2/6/2024 with diagnosis of Acute on chronic lower back pain.     Assessment: Patient has transitioned to TCU/ARU for short term rehabilitation:    Facility Name: [facility] (Main Phone: [phone] Admissions Phone: [phone] Fax: [phone])    Transition Communication:  Notified facility of Primary Care- Care Coordination support via Epic fax.    Plan: Care Coordinator will await notification from facility staff informing of patient's discharge plans/needs. Care Coordinator will review chart and outreach to facility staff every 4 weeks and as needed.     Susy Key RN Care Coordination   [facility]  Email: [email or web address]  Phone: [phone]

## 2024-02-07 NOTE — TELEPHONE ENCOUNTER
Called patient to confirm appointment with Rhina Gregory NP in our [place] location on 2/8/24.

## 2024-02-08 ENCOUNTER — LAB REQUISITION (OUTPATIENT)
Dept: LAB | Facility: CLINIC | Age: 84
End: 2024-02-08
Payer: COMMERCIAL

## 2024-02-08 ENCOUNTER — MEDICAL CORRESPONDENCE (OUTPATIENT)
Dept: HEALTH INFORMATION MANAGEMENT | Facility: CLINIC | Age: 84
End: 2024-02-08

## 2024-02-08 ENCOUNTER — OFFICE VISIT (OUTPATIENT)
Dept: NEUROSURGERY | Facility: CLINIC | Age: 84
End: 2024-02-08
Payer: COMMERCIAL

## 2024-02-08 VITALS
DIASTOLIC BLOOD PRESSURE: 78 MMHG | HEART RATE: 89 BPM | HEIGHT: 62 IN | WEIGHT: 209 LBS | BODY MASS INDEX: 38.46 KG/M2 | OXYGEN SATURATION: 98 % | TEMPERATURE: 97.8 F | SYSTOLIC BLOOD PRESSURE: 110 MMHG

## 2024-02-08 DIAGNOSIS — M54.50 ACUTE MIDLINE LOW BACK PAIN, UNSPECIFIED WHETHER SCIATICA PRESENT: ICD-10-CM

## 2024-02-08 DIAGNOSIS — N18.30 CHRONIC KIDNEY DISEASE, STAGE 3 UNSPECIFIED (H): ICD-10-CM

## 2024-02-08 DIAGNOSIS — Z98.1 S/P LUMBAR SPINAL FUSION: Primary | ICD-10-CM

## 2024-02-08 LAB
QUANTIFERON MITOGEN: 10 IU/ML
QUANTIFERON NIL TUBE: 0.04 IU/ML

## 2024-02-08 PROCEDURE — 99213 OFFICE O/P EST LOW 20 MIN: CPT | Performed by: NURSE PRACTITIONER

## 2024-02-08 ASSESSMENT — PAIN SCALES - GENERAL: PAINLEVEL: MODERATE PAIN (4)

## 2024-02-08 NOTE — PATIENT INSTRUCTIONS
-Continue to wear brace as previously recommended.   -Bone stimulator order placed. They will contact you to get scheduled.   -Follow up in 6 weeks with xray prior.  -Please contact our clinic with questions or concerns at [phone].

## 2024-02-08 NOTE — PROGRESS NOTES
"Mya Hui is a 83 year old female who presents for:  Chief Complaint   Patient presents with    Neurologic Problem        Initial Vitals:  /78 (BP Location: Left arm, Cuff Size: Adult Regular)   Pulse 89   Temp 97.8  F (36.6  C) (Temporal)   Ht 5' 2\" (1.575 m)   Wt 209 lb (94.8 kg)   LMP  (LMP Unknown)   SpO2 98%   BMI 38.23 kg/m   Estimated body mass index is 38.23 kg/m  as calculated from the following:    Height as of this encounter: 5' 2\" (1.575 m).    Weight as of this encounter: 209 lb (94.8 kg).. Body surface area is 2.04 meters squared. BP completed using cuff size: regular  Moderate Pain (4)    Nursing Comments:     Suki Hurt CMA    "

## 2024-02-08 NOTE — PROGRESS NOTES
Orthofix bone growth stimulator ordered. Emailed order, demographics (facesheet), Neurosurgery snapshot (medical hx etc.), progress notes from current to initial visit prior to surgery, op note to [employer] rep to [email or web address] on 02/08/24.    Wrote on facesheet that patient resides at the [facility].     Mansi Quinonez RN on 2/8/2024 at 2:50 PM

## 2024-02-08 NOTE — PROGRESS NOTES
"[facility] Neurosurgery  Neurosurgery Follow Up:    HPI: 3 months s/p T11-pelvis robotic decompression and fusion with Dr. Waite on 10/25/2023. Surgery was complicated by hemorrhagic shock. Recently presented to the ED with concerns of acute back pain after rolling over in bed. Imaging revealed L1 compression fracture. Today she was seen for her 3 month follow up. She continues to report midline back pain with pain to the right side. No radicular leg pain or paresthesias. Strength in legs has improved. Up until recent injury was walking well and had returned home. Since has returned to TCU for additional therapy prior to returning home. Incision healing well. Has been wearing brace as recommended. Does not have a bone stimulator.     Medical, surgical, family, and social history unchanged since prior exam.  Exam:  Constitutional:  Alert, well nourished, NAD.  HEENT: Normocephalic, atraumatic.   Pulm:  Without shortness of breath   CV:  No pitting edema of BLE.      Vital Signs:  /78 (BP Location: Left arm, Cuff Size: Adult Regular)   Pulse 89   Temp 97.8  F (36.6  C) (Temporal)   Ht 5' 2\" (1.575 m)   Wt 209 lb (94.8 kg)   LMP  (LMP Unknown)   SpO2 98%   BMI 38.23 kg/m      Neurological:  Awake  Alert  Oriented x 3  Motor exam: intact in BLE except 4/5 bilateral hip flexor and knee extension.     Able to spontaneously move L/E bilaterally  Sensation intact throughout all L/E dermatomes     Incisions:  Healing nicely.  Imaging:   EXAM: CT LUMBAR SPINE W/O and W CONTRAST  LOCATION: [facility]  DATE: 2/4/2024     INDICATION: Pain post op surgery  COMPARISON:  CT abdomen pelvis 10/31/2023, lumbar spine CT 10/16/2023, lumbar radiographs 11/08/2023.  CONTRAST: Isovue 370 75 mL  TECHNIQUE: Routine CT Lumbar Spine with IV contrast. Multiplanar reformats. Dose reduction techniques were used.     FINDINGS:  VERTEBRA: Diffuse demineralization. Postoperative changes from T11-S1 " PLIF with right L3-L4 and L4-L5 TLIFs and bilateral sacroiliac anchors. Multilevel laminectomies. No acute hardware fracture. Interval loosening of the bilateral T11 and right T12   pedicle screws. New L1 superior endplate compression fracture with less than 25% vertebral body height loss. Scoliosis. Minimal retrolisthesis of T12 on L1 and minimal anterolisthesis of L1 on L2. Minimal anterolisthesis of L4 on L5.     CANAL/FORAMINA: Not well assessed due to beam hardening from the fusion construct.     PARASPINAL: Postoperative changes of the dorsal paraspinal soft tissues with possible small superficial fluid collection on the right measuring 1.8 x 2.0 cm at L4-L5 level (series 9, image 116).                                                                      IMPRESSION:  1.  Multilevel postoperative changes of the lumbar spine from T11-S1 with interval loosening of the bilateral T11 and right T12 pedicle screws since 10/16/2023.  2.  New L1 superior endplate compression fracture with less than 25% vertebral body height loss since 10/16/2023.  3.  Sterility indeterminate 2 cm superficial fluid collection in the superficial postoperative bed on the right at L4-L5.    A/P: s/p thoracolumbar fusion, hemorrhagic shock  Continue to increase activities as tolerated. Will continue to wear brace as previously recommended. Will obtain bone stimulator. Follow up in 6 weeks with xray prior. She verbalized understanding and agreement.   Patient Instructions   -Continue to wear brace as previously recommended.   -Bone stimulator order placed. They will contact you to get scheduled.   -Follow up in 6 weeks with xray prior.  -Please contact our clinic with questions or concerns at [phone].    Rhina Gregory, [facility] [place] Neurosurgery  [address] [place] [id number]  Tel [phone]  Fax [phone]

## 2024-02-09 LAB
ANION GAP SERPL CALCULATED.3IONS-SCNC: 13 MMOL/L (ref 7–15)
BUN SERPL-MCNC: 15.5 MG/DL (ref 8–23)
CALCIUM SERPL-MCNC: 9.9 MG/DL (ref 8.8–10.2)
CHLORIDE SERPL-SCNC: 107 MMOL/L (ref 98–107)
CREAT SERPL-MCNC: 1.19 MG/DL (ref 0.51–0.95)
DEPRECATED HCO3 PLAS-SCNC: 26 MMOL/L (ref 22–29)
EGFRCR SERPLBLD CKD-EPI 2021: 46 ML/MIN/1.73M2
GAMMA INTERFERON BACKGROUND BLD IA-ACNC: 0.04 IU/ML
GLUCOSE SERPL-MCNC: 113 MG/DL (ref 70–99)
HGB BLD-MCNC: 10.3 G/DL (ref 11.7–15.7)
M TB IFN-G BLD-IMP: POSITIVE
M TB IFN-G CD4+ BCKGRND COR BLD-ACNC: 9.96 IU/ML
MITOGEN IGNF BCKGRD COR BLD-ACNC: 0.58 IU/ML
MITOGEN IGNF BCKGRD COR BLD-ACNC: 0.61 IU/ML
POTASSIUM SERPL-SCNC: 3.9 MMOL/L (ref 3.4–5.3)
QUANTIFERON TB1 TUBE: 0.62 IU/ML
QUANTIFERON TB2 TUBE: 0.65
SODIUM SERPL-SCNC: 146 MMOL/L (ref 135–145)

## 2024-02-09 PROCEDURE — 85018 HEMOGLOBIN: CPT | Performed by: FAMILY MEDICINE

## 2024-02-09 PROCEDURE — 36415 COLL VENOUS BLD VENIPUNCTURE: CPT | Performed by: FAMILY MEDICINE

## 2024-02-09 PROCEDURE — P9604 ONE-WAY ALLOW PRORATED TRIP: HCPCS | Performed by: FAMILY MEDICINE

## 2024-02-09 PROCEDURE — 80048 BASIC METABOLIC PNL TOTAL CA: CPT | Performed by: FAMILY MEDICINE

## 2024-02-12 ENCOUNTER — OFFICE VISIT (OUTPATIENT)
Dept: UROLOGY | Facility: CLINIC | Age: 84
End: 2024-02-12
Payer: COMMERCIAL

## 2024-02-12 VITALS
DIASTOLIC BLOOD PRESSURE: 78 MMHG | TEMPERATURE: 97.8 F | BODY MASS INDEX: 38.46 KG/M2 | WEIGHT: 209 LBS | HEIGHT: 62 IN | SYSTOLIC BLOOD PRESSURE: 118 MMHG

## 2024-02-12 VITALS
HEART RATE: 72 BPM | DIASTOLIC BLOOD PRESSURE: 85 MMHG | HEIGHT: 62 IN | BODY MASS INDEX: 38.46 KG/M2 | WEIGHT: 209 LBS | RESPIRATION RATE: 18 BRPM | TEMPERATURE: 97.8 F | SYSTOLIC BLOOD PRESSURE: 169 MMHG | OXYGEN SATURATION: 93 %

## 2024-02-12 DIAGNOSIS — N32.81 OAB (OVERACTIVE BLADDER): Primary | ICD-10-CM

## 2024-02-12 PROCEDURE — 99203 OFFICE O/P NEW LOW 30 MIN: CPT | Performed by: UROLOGY

## 2024-02-12 ASSESSMENT — PAIN SCALES - GENERAL: PAINLEVEL: NO PAIN (0)

## 2024-02-12 NOTE — PROGRESS NOTES
[facility]    PRIMARY CARE PROVIDER AND CLINIC:  Kisha Stinson PA-C, [address] [facility] [address] / [facility] [address]  Chief Complaint   Patient presents with    RECHECK      [facility] Medical Record Number:  [id number]  Place of Service where encounter took place:  [facility] ([facility]) [[id number]]    Mya Hui  is a 83 year old  (1940), admitted to the above facility from  [facility]. Hospital stay 2/4/24 through 2/6/24.    HPI:    This is a 83-year-old female with past medical history of hypertension, hyperlipidemia, type 2 diabetes, CKD stage IIIa who underwent recent lumbar fusion.  Apparently CT showed loose pedicle screws and a new compression fracture, neurosurgery consulted, continue to wear TLSO and pain management, will follow-up outpatient.  Did have acute respiratory failure requiring supplemental oxygen though discharged on room air with incentive spirometry encouraged.  No other changes noted, discharged to [facility] for rehab.     CODE STATUS/ADVANCE DIRECTIVES DISCUSSION:  Prior  DNR / DNI  ALLERGIES:   Allergies   Allergen Reactions    Fentanyl Nausea and Vomiting     VERY sensitive to most narcotics if not all.    Morphine And Related Nausea      PAST MEDICAL HISTORY:   Past Medical History:   Diagnosis Date    Acute cholecystitis 3/23/2018    Acute kidney injury (H24) 9/10/2017    Arthritis     Dehydration 9/4/2017    Diabetes type 2, controlled (H)     Elevated lactic acid level 9/10/2017    GERD (gastroesophageal reflux disease)     History of renal calculi     HTN, goal below 140/90     Hyperlipidaemia LDL goal < 100     Hypokalemia 9/3/2017    Hypothyroid     Insomnia     Left carotid stenosis     Migraine     Motion sickness     PONV (postoperative nausea and vomiting)     Sciatica of right side 6/28/2013    Type 2 diabetes mellitus without complication, without long-term current use of insulin (H)  2/16/2017      PAST SURGICAL HISTORY:   has a past surgical history that includes fracture tx, ankle rt/lt; REVISE MEDIAN N/CARPAL TUNNEL SURG; CYSTOURETHROSCOPY W/ URETEROSCOPY &/OR PYELOSCOPY; W/ LITHOTRIPSY; Bunionectomy; Colonoscopy (7/12/2013); Inject joint sacroiliac (4/10/2014); cataract iol, rt/lt (Bilateral, 2017); Laparoscopic cholecystectomy (N/A, 3/24/2018); Inject epidural lumbar (Right, 12/27/2018); Colonoscopy with CO2 insufflation (N/A, 1/31/2019); Colonoscopy (N/A, 1/31/2019); Inject joint sacroiliac (Left, 4/11/2019); Inject epidural transforaminal (Bilateral, 5/27/2021); Inject epidural transforaminal (Right, 6/17/2022); Inject joint sacroiliac (Right, 3/3/2023); Inject epidural transforaminal (Right, 7/13/2023); Fusion, Spine, Lumbar, 3 Or More Levels, Posterior Approach, Robotic-Assisted (N/A, 10/25/2023); IR CVC Tunnel Placement > 5 Yrs of Age (10/30/2023); and IR CVC Tunnel Removal Right (11/24/2023).  FAMILY HISTORY: family history includes Blood Disease in her brother; Breast Cancer in her mother; Cardiovascular in her brother and father; Cerebrovascular Disease in her brother; Diabetes in her brother, brother, and father; Hypertension in her brother.  SOCIAL HISTORY:   reports that she has never smoked. She has never used smokeless tobacco. She reports current alcohol use. She reports that she does not use drugs.  Patient's living condition: lives alone    Post Discharge Medication Reconciliation Status:   MED REC REQUIRED  Post Medication Reconciliation Status:  Discharge medications reconciled and changed, see notes/orders       Current Outpatient Medications   Medication Sig    acetaminophen (TYLENOL) 500 MG tablet Take 1,000 mg by mouth every 6 hours as needed for mild pain    blood glucose (ACCU-CHEK GUIDE) test strip Use to test blood sugar 1 times daily or as directed. (Patient not taking: Reported on 2/4/2024)    blood glucose monitoring (ACCU-CHEK FASTCLIX) lancets USE TO TEST  "BLOOD SUGARS ONCE DAILY    cholestyramine (QUESTRAN) 4 g packet DISSOLVE AND TAKE ONE PACKET BY MOUTH TWICE A DAY (Patient taking differently: Take 1 packet by mouth 2 times daily)    diltiazem ER COATED BEADS (CARDIZEM CD/CARTIA XT) 180 MG 24 hr capsule Take 1 capsule (180 mg) by mouth daily    Incontinence Supply Disposable (PROTECTIVE UNDERWEAR SUPER LG) MISC 3 each daily    levothyroxine (SYNTHROID/LEVOTHROID) 100 MCG tablet Take 1 tablet (100 mcg) by mouth daily    Lidocaine (LIDOCARE) 4 % Patch Place 1 patch onto the skin every 24 hours To prevent lidocaine toxicity, patient should be patch free for 12 hrs daily. (Patient not taking: Reported on 2/8/2024)    order for DME Equipment being ordered: Nike shoes and insoles    oxyBUTYnin ER (DITROPAN XL) 5 MG 24 hr tablet Take 1 tablet (5 mg) by mouth daily (Patient taking differently: Take 5 mg by mouth at bedtime)    pantoprazole (PROTONIX) 40 MG EC tablet Take 1 tablet (40 mg) by mouth daily    polyethylene glycol (MIRALAX) 17 g packet Take 17 g by mouth daily Hold for loose stools    pravastatin (PRAVACHOL) 40 MG tablet Take 1 tablet (40 mg) by mouth daily (Patient taking differently: Take 40 mg by mouth at bedtime)     No current facility-administered medications for this visit.       ROS:  10 point ROS of systems including Constitutional, Eyes, Respiratory, Cardiovascular, Gastroenterology, Genitourinary, Integumentary, Musculoskeletal, Psychiatric were all negative except for pertinent positives noted in my HPI.    Vitals:  BP (!) 169/85   Pulse 72   Temp 97.8  F (36.6  C)   Resp 18   Ht 1.575 m (5' 2\")   Wt 94.8 kg (209 lb)   LMP  (LMP Unknown)   SpO2 93%   BMI 38.23 kg/m    Exam:  GENERAL APPEARANCE:  Alert, in no distress, oriented, cooperative, denies pain  ENT:  Mouth and posterior oropharynx normal, moist mucous membranes, normal hearing acuity  NECK:  No adenopathy,masses or thyromegaly  RESP:  lungs clear to auscultation , no respiratory " distress  CV:  regular rate and rhythm, no murmur, rub, or gallop, no edema  ABDOMEN:  normal bowel sounds, soft, nontender, no hepatosplenomegaly or other masses  M/S:   wearing TLSO, able to move all extremities  SKIN:  Inspection of skin and subcutaneous tissue baseline  NEURO:   No focal deficits  PSYCH:  oriented X 3    Lab/Diagnostic data:  Most Recent 3 CBC's:  Recent Labs   Lab Test 02/09/24  0730 02/06/24  1201 02/06/24  0451 02/05/24  0516 02/04/24  1715   WBC  --   --  5.5 7.5 6.6   HGB 10.3* 9.2* 8.4* 9.1* 9.3*   MCV  --   --  90 88 88   PLT  --   --  233 244 241     Most Recent 3 BMP's:  Recent Labs   Lab Test 02/09/24  0730 02/06/24  1155 02/06/24  0744 02/06/24  0451 02/05/24  0739 02/05/24  0516   *  --   --  143  --  141   POTASSIUM 3.9  --   --  3.9  --  3.8   CHLORIDE 107  --   --  109*  --  106   CO2 26  --   --  23  --  22   BUN 15.5  --   --  20.1  --  15.0   CR 1.19*  --   --  1.37*  --  1.13*   ANIONGAP 13  --   --  11  --  13   TERESA 9.9  --   --  8.7*  --  9.0   * 148* 121* 124*   < > 150*    < > = values in this interval not displayed.     Most Recent TSH and T4:  Recent Labs   Lab Test 10/31/23  0940   TSH 0.17*   T4 0.64*     Most Recent Hemoglobin A1c:  Recent Labs   Lab Test 10/25/23  1629   A1C 6.3*       ASSESSMENT/PLAN:    (Z98.1) S/P lumbar spinal fusion  Compression fx  Wearing TLSO, follow-up with [facility] (TBD)    Pain  Continue Tylenol 1000 mg every 6 hours as needed with lidocaine patch, denies pain    (I10) Essential hypertension with goal blood pressure less than 140/90  Continue Cardizem 180 mg daily, monitor blood pressure twice daily    (E11.9) Type 2 diabetes mellitus without complication, without long-term current use of insulin (H)  Diet controlled, last A1c 6.3 on 10/25.  Discontinue blood sugar checks    Overactive bladder  Follows with urology, recently increased oxybutynin to 10 mg at bedtime    (K21.9) Gastroesophageal reflux disease without esophagitis     Continue pantoprazole 40 mg daily    Morbid obesity  Last BMI>38     (E03.9) Hypothyroidism, unspecified type  Continue Synthroid 100 mcg daily, recheck TSH with PCP    (N18.31) Stage 3a chronic kidney disease (H)  Last CR 1.19 with GFR 46 on 2/9    (D64.9) Normocytic anemia  Last Hgb 10.3 on 2/9    Constipation  Continue MiraLAX daily    HLD  Continue Questran 4 g twice daily    Orders:  Monitor blood pressure twice daily    Electronically signed by:  Reuben Landaverde NP

## 2024-02-12 NOTE — PROGRESS NOTES
CC:    HPI:  Mya Hui is a 83 year old female who is seen for a consultation with regard to patient's urinary frequency and nocturia.  This problem has been going on for many years and has been getting worse.  It is associated with no incontinence.  She has  had  previous treatment for her condition and has tried Kegel exercises in the past which have not significantly helped.  The patient voids q2 hours, nocturia X 2-3.  She drinks normally.  She denies any dysuria, nocturia, hematuria, pyuria, hesitency, intermittency, feeling of incomplete emptying, or any recent hx of UTI's or stones.  She is currently on oxybutynin 5 mg p.o. daily.  She was prescribed Myrbetriq previously but could not afford the medication.      Current Outpatient Medications   Medication Sig Dispense Refill    acetaminophen (TYLENOL) 500 MG tablet Take 1,000 mg by mouth every 6 hours as needed for mild pain      blood glucose monitoring (ACCU-CHEK FASTCLIX) lancets USE TO TEST BLOOD SUGARS ONCE DAILY 100 each 11    cholestyramine (QUESTRAN) 4 g packet DISSOLVE AND TAKE ONE PACKET BY MOUTH TWICE A DAY (Patient taking differently: Take 1 packet by mouth 2 times daily) 60 packet 5    diltiazem ER COATED BEADS (CARDIZEM CD/CARTIA XT) 180 MG 24 hr capsule Take 1 capsule (180 mg) by mouth daily 30 capsule 1    Incontinence Supply Disposable (PROTECTIVE UNDERWEAR SUPER LG) MISC 3 each daily 90 each 11    levothyroxine (SYNTHROID/LEVOTHROID) 100 MCG tablet Take 1 tablet (100 mcg) by mouth daily 90 tablet 3    order for DME Equipment being ordered: Nike shoes and insoles 2 Units 1    oxyBUTYnin ER (DITROPAN XL) 5 MG 24 hr tablet Take 1 tablet (5 mg) by mouth daily (Patient taking differently: Take 5 mg by mouth at bedtime) 30 tablet 0    pantoprazole (PROTONIX) 40 MG EC tablet Take 1 tablet (40 mg) by mouth daily 30 tablet 1    polyethylene glycol (MIRALAX) 17 g packet Take 17 g by mouth daily Hold for loose stools      pravastatin (PRAVACHOL)  40 MG tablet Take 1 tablet (40 mg) by mouth daily (Patient taking differently: Take 40 mg by mouth at bedtime) 90 tablet 3    blood glucose (ACCU-CHEK GUIDE) test strip Use to test blood sugar 1 times daily or as directed. (Patient not taking: Reported on 2/4/2024) 100 strip 11    Lidocaine (LIDOCARE) 4 % Patch Place 1 patch onto the skin every 24 hours To prevent lidocaine toxicity, patient should be patch free for 12 hrs daily. (Patient not taking: Reported on 2/8/2024)       Allergies   Allergen Reactions    Fentanyl Nausea and Vomiting     VERY sensitive to most narcotics if not all.    Morphine And Related Nausea     Past Medical History:   Diagnosis Date    Acute cholecystitis 3/23/2018    Acute kidney injury (H24) 9/10/2017    Arthritis     Dehydration 9/4/2017    Diabetes type 2, controlled (H)     Elevated lactic acid level 9/10/2017    GERD (gastroesophageal reflux disease)     History of renal calculi     HTN, goal below 140/90     Hyperlipidaemia LDL goal < 100     Hypokalemia 9/3/2017    Hypothyroid     Insomnia     Left carotid stenosis     Migraine     Motion sickness     PONV (postoperative nausea and vomiting)     Sciatica of right side 6/28/2013    Type 2 diabetes mellitus without complication, without long-term current use of insulin (H) 2/16/2017     Past Surgical History:   Procedure Laterality Date    BUNIONECTOMY      Right foot    CATARACT IOL, RT/LT Bilateral 2017    COLONOSCOPY  7/12/2013    Procedure: COLONOSCOPY;  Colonoscopy;  Surgeon: Giovany Espinoza MD;  Location: [facility]    COLONOSCOPY N/A 1/31/2019    Procedure: Combined Colonoscopy, Single Or Multiple Biopsy/Polypectomy By Biopsy;  Surgeon: Efren Osorio MD;  Location: [facility]    COLONOSCOPY WITH CO2 INSUFFLATION N/A 1/31/2019    Procedure: COLONOSCOPY WITH CO2 INSUFFLATION;  Surgeon: Efren Osorio MD;  Location: [facility]    FRACTURE TX, ANKLE RT/LT      Left ankle    FUSION, SPINE, LUMBAR, 3 OR MORE  LEVELS, POSTERIOR APPROACH, ROBOTIC-ASSISTED N/A 10/25/2023    Procedure: Thoracic 11 to pelvis robotic decompression and fusion;  Surgeon: Mumtaz Waite MD;  Location: [facility] OR    HC CYSTOURETHROSCOPY W/ URETEROSCOPY &/OR PYELOSCOPY; W/ LITHOTRIPSY      HC REVISE MEDIAN N/CARPAL TUNNEL SURG      Right wrist    INJECT EPIDURAL LUMBAR Right 12/27/2018    Procedure: INJECT EPIDURAL LUMBAR right foraminal narrowing severe at L4-L5 and moderate at L5-S1;  Surgeon: Gilbert Mcclure MD;  Location: [facility]    INJECT EPIDURAL TRANSFORAMINAL Bilateral 5/27/2021    Procedure: Bilateral Lumbar 5-sacral 1 Epidural steroid injection;  Surgeon: Gilbert Mcclure MD;  Location: [facility]    INJECT EPIDURAL TRANSFORAMINAL Right 6/17/2022    Procedure: Attempted right Lumbar 4-5 and Lumbar 5-Sacral 1 epidural injections with completion of only the right Lumbar 5-Sacral1 Transforaminal Epidural Steroid Injection with fluoroscopic guidance and contrast;  Surgeon: Gilbert Mcclure MD;  Location: [facility]    INJECT EPIDURAL TRANSFORAMINAL Right 7/13/2023    Procedure: Lumbar 3-4 interlaminar epidural steroid injection using fluoroscopic guidance with contrast dye, Right;  Surgeon: Gilbert Mcclure MD;  Location: [facility]    INJECT JOINT SACROILIAC  4/10/2014    Procedure: INJECT JOINT SACROILIAC;  Sacroiliac Joint Injection;  Surgeon: Gilbert Mcclure MD;  Location: [facility]    INJECT JOINT SACROILIAC Left 4/11/2019    Procedure: INJECT JOINT SACROILIAC- LEFT;  Surgeon: Gilbert Mcclure MD;  Location: [facility]    INJECT JOINT SACROILIAC Right 3/3/2023    Procedure: Fluoroscopically-guided injection into the Right Sacroiliac joint;  Surgeon: Gilbert Mcclure MD;  Location: [facility]    IR CVC TUNNEL PLACEMENT > 5 YRS OF AGE  10/30/2023    IR CVC TUNNEL REMOVAL RIGHT  11/24/2023    LAPAROSCOPIC CHOLECYSTECTOMY N/A 3/24/2018    Procedure: LAPAROSCOPIC CHOLECYSTECTOMY;  Laparoscopic Cholecystectomy;  Surgeon: Berry Allen DO;  Location:  OR      Family  History   Problem Relation Age of Onset    Hypertension Brother     Cerebrovascular Disease Brother     Diabetes Father     Cardiovascular Father     Diabetes Brother         Borderline    Breast Cancer Mother     Diabetes Brother     Blood Disease Brother     Cardiovascular Brother         MI     Social History     Socioeconomic History    Marital status:      Spouse name: None    Number of children: None    Years of education: None    Highest education level: None   Occupational History     Employer: RETIRED     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   Tobacco Use    Smoking status: Never    Smokeless tobacco: Never   Vaping Use    Vaping Use: Never used   Substance and Sexual Activity    Alcohol use: Yes    Drug use: No    Sexual activity: Not Currently     Social Determinants of Health     Financial Resource Strain: Low Risk  (1/16/2024)    Financial Resource Strain     Within the past 12 months, have you or your family members you live with been unable to get utilities (heat, electricity) when it was really needed?: No   Food Insecurity: Low Risk  (1/16/2024)    Food Insecurity     Within the past 12 months, did you worry that your food would run out before you got money to buy more?: No     Within the past 12 months, did the food you bought just not last and you didn t have money to get more?: No   Transportation Needs: Low Risk  (1/16/2024)    Transportation Needs     Within the past 12 months, has lack of transportation kept you from medical appointments, getting your medicines, non-medical meetings or appointments, work, or from getting things that you need?: No   Interpersonal Safety: Low Risk  (10/19/2023)    Interpersonal Safety     Do you feel physically and emotionally safe where you currently live?: Yes     Within the past 12 months, have you been hit, slapped, kicked or otherwise physically hurt by someone?: No     Within the past 12 months, have you been humiliated or emotionally abused in other  "ways by your partner or ex-partner?: No   Housing Stability: Low Risk  (1/16/2024)    Housing Stability     Do you have housing? : Yes     Are you worried about losing your housing?: No       REVIEW OF SYSTEMS  =================  C: NEGATIVE for fever, chills, change in weight  I: NEGATIVE for worrisome rashes, moles or lesions  E/M: NEGATIVE for ear, mouth and throat problems  R: NEGATIVE for significant cough or SHORTNESS OF BREATH  CV:  NEGATIVE for chest pain, palpitations or peripheral edema  GI: NEGATIVE for nausea, abdominal pain, heartburn, or change in bowel habits  NEURO: NEGATIVE numbness/weakness  : see HPI  PSYCH: NEGATIVE depression/anxiety  LYmph: no new enlarged lymph nodes  Ortho: no new trauma/movements      Physical Exam:  /78 (BP Location: Right arm, Patient Position: Sitting, Cuff Size: Adult Regular)   Temp 97.8  F (36.6  C) (Temporal)   Ht 1.575 m (5' 2.01\")   Wt 94.8 kg (209 lb)   LMP  (LMP Unknown)   BMI 38.22 kg/m     GENERAL: alert and no distress  EYES: Eyes grossly normal to inspection.  No discharge or erythema, or obvious scleral/conjunctival abnormalities.  RESP: No audible wheeze, cough, or visible cyanosis.    SKIN: Visible skin clear. No significant rash, abnormal pigmentation or lesions.  NEURO: Cranial nerves grossly intact.  Mentation and speech appropriate for age.  PSYCH: Appropriate affect, tone, and pace of words       Lab Requisition on 02/09/2024   Component Date Value Ref Range Status    Hemoglobin 02/09/2024 10.3 (L)  11.7 - 15.7 g/dL Final    Sodium 02/09/2024 146 (H)  135 - 145 mmol/L Final    Reference intervals for this test were updated on 09/26/2023 to more accurately reflect our healthy population. There may be differences in the flagging of prior results with similar values performed with this method. Interpretation of those prior results can be made in the context of the updated reference intervals.     Potassium 02/09/2024 3.9  3.4 - 5.3 mmol/L " Final    Chloride 02/09/2024 107  98 - 107 mmol/L Final    Carbon Dioxide (CO2) 02/09/2024 26  22 - 29 mmol/L Final    Anion Gap 02/09/2024 13  7 - 15 mmol/L Final    Urea Nitrogen 02/09/2024 15.5  8.0 - 23.0 mg/dL Final    Creatinine 02/09/2024 1.19 (H)  0.51 - 0.95 mg/dL Final    GFR Estimate 02/09/2024 46 (L)  >60 mL/min/1.73m2 Final    Calcium 02/09/2024 9.9  8.8 - 10.2 mg/dL Final    Glucose 02/09/2024 113 (H)  70 - 99 mg/dL Final       IMAGING:    ASSESSMENT and PLAN:  This is a 83 year old female with OAB symptoms.    Different management options were discussed with the patient including observation, Kegel exercises, biofeedback, PT, medication, PTNS, Botox, and Interstim.    Will increase oxybutynin to 10 mg daily.           Reuben Baez MD

## 2024-02-13 ENCOUNTER — TRANSITIONAL CARE UNIT VISIT (OUTPATIENT)
Dept: GERIATRICS | Facility: CLINIC | Age: 84
End: 2024-02-13
Payer: COMMERCIAL

## 2024-02-13 ENCOUNTER — DOCUMENTATION ONLY (OUTPATIENT)
Dept: OTHER | Facility: CLINIC | Age: 84
End: 2024-02-13

## 2024-02-13 VITALS
SYSTOLIC BLOOD PRESSURE: 162 MMHG | BODY MASS INDEX: 38.46 KG/M2 | OXYGEN SATURATION: 97 % | HEIGHT: 62 IN | RESPIRATION RATE: 17 BRPM | DIASTOLIC BLOOD PRESSURE: 81 MMHG | WEIGHT: 209 LBS | HEART RATE: 75 BPM | TEMPERATURE: 97.8 F

## 2024-02-13 DIAGNOSIS — Z98.1 S/P LUMBAR SPINAL FUSION: ICD-10-CM

## 2024-02-13 DIAGNOSIS — E03.9 HYPOTHYROIDISM, UNSPECIFIED TYPE: ICD-10-CM

## 2024-02-13 DIAGNOSIS — E11.9 TYPE 2 DIABETES MELLITUS WITHOUT COMPLICATION, WITHOUT LONG-TERM CURRENT USE OF INSULIN (H): ICD-10-CM

## 2024-02-13 DIAGNOSIS — K21.9 GASTROESOPHAGEAL REFLUX DISEASE WITHOUT ESOPHAGITIS: Primary | ICD-10-CM

## 2024-02-13 DIAGNOSIS — I10 ESSENTIAL HYPERTENSION WITH GOAL BLOOD PRESSURE LESS THAN 140/90: ICD-10-CM

## 2024-02-13 DIAGNOSIS — D64.9 NORMOCYTIC ANEMIA: ICD-10-CM

## 2024-02-13 DIAGNOSIS — N18.31 STAGE 3A CHRONIC KIDNEY DISEASE (H): ICD-10-CM

## 2024-02-13 PROCEDURE — 99309 SBSQ NF CARE MODERATE MDM 30: CPT | Performed by: NURSE PRACTITIONER

## 2024-02-13 NOTE — PROGRESS NOTES
"[facility] GERIATRICS    PRIMARY CARE PROVIDER AND CLINIC:  Kisha tSinson PA-C, [address] / [facility] [address]  Chief Complaint   Patient presents with    Hospital F/U      [place] Medical Record Number:  [id number]  Place of Service where encounter took place:  [facility] AND REHAB [facility] ([facility]) [[id number]]    Mya Hui  is a 83 year old  (1940), admitted to the above facility from  [facility]. Hospital stay 2/4/24 through 2/6/24..   HPI:    As per DNP:\"This is a 83-year-old female with past medical history of hypertension, hyperlipidemia, type 2 diabetes, CKD stage IIIa who underwent recent lumbar fusion.  Apparently CT showed loose pedicle screws and a new compression fracture, neurosurgery consulted, continue to wear TLSO and pain management, will follow-up outpatient.  Did have acute respiratory failure requiring supplemental oxygen though discharged on room air with incentive spirometry encouraged.  No other changes noted, discharged to [facility] for rehab. \"      TODAY:  - Spine pain: reports better. Wears brace when is out of the bed.   -Rehab: reports going well  -anemia: appetite is fine  =======================================================  CODE STATUS/ADVANCE DIRECTIVES DISCUSSION:  Prior  DNR / DNI  ALLERGIES:   Allergies   Allergen Reactions    Fentanyl Nausea and Vomiting     VERY sensitive to most narcotics if not all.    Morphine And Related Nausea      PAST MEDICAL HISTORY:   Past Medical History:   Diagnosis Date    Acute cholecystitis 3/23/2018    Acute kidney injury (H24) 9/10/2017    Arthritis     Dehydration 9/4/2017    Diabetes type 2, controlled (H)     Elevated lactic acid level 9/10/2017    GERD (gastroesophageal reflux disease)     History of renal calculi     HTN, goal below 140/90     Hyperlipidaemia LDL goal < 100     Hypokalemia 9/3/2017    Hypothyroid     Insomnia     Left carotid stenosis     " Migraine     Motion sickness     PONV (postoperative nausea and vomiting)     Sciatica of right side 6/28/2013    Type 2 diabetes mellitus without complication, without long-term current use of insulin (H) 2/16/2017      PAST SURGICAL HISTORY:   has a past surgical history that includes fracture tx, ankle rt/lt; REVISE MEDIAN N/CARPAL TUNNEL SURG; CYSTOURETHROSCOPY W/ URETEROSCOPY &/OR PYELOSCOPY; W/ LITHOTRIPSY; Bunionectomy; Colonoscopy (7/12/2013); Inject joint sacroiliac (4/10/2014); cataract iol, rt/lt (Bilateral, 2017); Laparoscopic cholecystectomy (N/A, 3/24/2018); Inject epidural lumbar (Right, 12/27/2018); Colonoscopy with CO2 insufflation (N/A, 1/31/2019); Colonoscopy (N/A, 1/31/2019); Inject joint sacroiliac (Left, 4/11/2019); Inject epidural transforaminal (Bilateral, 5/27/2021); Inject epidural transforaminal (Right, 6/17/2022); Inject joint sacroiliac (Right, 3/3/2023); Inject epidural transforaminal (Right, 7/13/2023); Fusion, Spine, Lumbar, 3 Or More Levels, Posterior Approach, Robotic-Assisted (N/A, 10/25/2023); IR CVC Tunnel Placement > 5 Yrs of Age (10/30/2023); and IR CVC Tunnel Removal Right (11/24/2023).  FAMILY HISTORY: family history includes Blood Disease in her brother; Breast Cancer in her mother; Cardiovascular in her brother and father; Cerebrovascular Disease in her brother; Diabetes in her brother, brother, and father; Hypertension in her brother.  SOCIAL HISTORY:   reports that she has never smoked. She has never used smokeless tobacco. She reports current alcohol use. She reports that she does not use drugs.  Patient's living condition: lives in an assisted living facility    Post Discharge Medication Reconciliation Status:   MED REC REQUIRED  Post Medication Reconciliation Status: discharge medications reconciled and changed, per note/orders       Current Outpatient Medications   Medication Sig    acetaminophen (TYLENOL) 500 MG tablet Take 1,000 mg by mouth every 6 hours as needed  "for mild pain    blood glucose (ACCU-CHEK GUIDE) test strip Use to test blood sugar 1 times daily or as directed. (Patient not taking: Reported on 2/4/2024)    blood glucose monitoring (ACCU-CHEK FASTCLIX) lancets USE TO TEST BLOOD SUGARS ONCE DAILY    cholestyramine (QUESTRAN) 4 g packet DISSOLVE AND TAKE ONE PACKET BY MOUTH TWICE A DAY (Patient taking differently: Take 1 packet by mouth 2 times daily)    diltiazem ER COATED BEADS (CARDIZEM CD/CARTIA XT) 180 MG 24 hr capsule Take 1 capsule (180 mg) by mouth daily    Incontinence Supply Disposable (PROTECTIVE UNDERWEAR SUPER LG) MISC 3 each daily    levothyroxine (SYNTHROID/LEVOTHROID) 100 MCG tablet Take 1 tablet (100 mcg) by mouth daily    Lidocaine (LIDOCARE) 4 % Patch Place 1 patch onto the skin every 24 hours To prevent lidocaine toxicity, patient should be patch free for 12 hrs daily. (Patient not taking: Reported on 2/8/2024)    order for DME Equipment being ordered: Nike shoes and insoles    oxyBUTYnin ER (DITROPAN XL) 5 MG 24 hr tablet Take 1 tablet (5 mg) by mouth daily (Patient taking differently: Take 10 mg by mouth at bedtime)    pantoprazole (PROTONIX) 40 MG EC tablet Take 1 tablet (40 mg) by mouth daily    polyethylene glycol (MIRALAX) 17 g packet Take 17 g by mouth daily Hold for loose stools    pravastatin (PRAVACHOL) 40 MG tablet Take 1 tablet (40 mg) by mouth daily (Patient taking differently: Take 40 mg by mouth at bedtime)     No current facility-administered medications for this visit.       ROS:  10 point ROS of systems including Constitutional, Eyes, Respiratory, Cardiovascular, Gastroenterology, Genitourinary, Integumentary, Musculoskeletal, Psychiatric were all negative except for pertinent positives noted in my HPI.    Vitals:  BP (!) 162/81   Pulse 75   Temp 97.8  F (36.6  C)   Resp 17   Ht 1.575 m (5' 2\")   Wt 94.8 kg (209 lb)   LMP  (LMP Unknown)   SpO2 97%   BMI 38.23 kg/m    Exam:  GENERAL APPEARANCE:  in no distress,   RESP:  " Unlabored breathing. CTA b/l.   CV:  S1S2 audible, regular HR, no murmur appreciated.   ABDOMEN:  soft, NT/ND, BS audible.   M/S:   no joint deformity noted on observation.   SKIN:  No rash noted on observation  NEURO:   No NFD appreciated on observation.   PSYCH:  affect and mood normal      Lab/Diagnostic data: Reviewed in the chart and EHR.        ASSESSMENT/PLAN:  --------------------------------  Loosening of hardware in spine  T11 and T12 (H24)  S/P lumbar spinal fusion (T11-S1, robotic, 11/2023)  Compression fracture of L1 vertebra with routine healing, subsequent encounter  Chronic back pain, unspecified back location, unspecified back pain laterality  -- Analgesia optimal with the current regimen. Continue present plan and medications.  - Followed by Orthopedic Team. Follow on the recommendations / instructions.   - Started rehab program, making a progress, continue until desired goal is achieved.     Diet-controlled diabetes mellitus (H)    Stage 3a chronic kidney disease (H)  Mild hypernatremia, Na 146  - Avoid nephrotoxic  medications. Renally dose medications. Monitor electrolytes, and dehydration status  -encourage po fluid intake.     Essential hypertension with goal blood pressure less than 140/90  NSVT  - was on lisinopril which was discontinued (11/2023) due to ATN. Also was on hydralazine .   - asymptomatic. Clinically stable.     OAB (overactive bladder), chronic:  - saw urology recently and oxybutynin increased. Monitor for AE.       Normocytic anemia  - stable. Not on supplement.     Central hypothyroidism  - TSH and FT4 low in Oct 2023. On supplement  - unsure if had work up for pituitary gland other hormone deficiency  Avoid nephrotoxic  medications. Renally dose medications. Monitor electrolytes, and dehydration status      Orders:  NNO      Electronically signed by:  Loida Neil MD

## 2024-02-13 NOTE — LETTER
2/13/2024        RE: Mya Hui  [address]  [facility] [address]        [facility] GERIATRICS    PRIMARY CARE PROVIDER AND CLINIC:  Kisha Stinson PA-C, [address] [facility] [address] / [facility] [address]  Chief Complaint   Patient presents with     RECHECK      [place] Medical Record Number:  [id number]  Place of Service where encounter took place:  [facility] [[id number]]    Mya Hui  is a 83 year old  (1940), admitted to the above facility from  [facility]. Hospital stay 2/4/24 through 2/6/24.    HPI:    This is a 83-year-old female with past medical history of hypertension, hyperlipidemia, type 2 diabetes, CKD stage IIIa who underwent recent lumbar fusion.  Apparently CT showed loose pedicle screws and a new compression fracture, neurosurgery consulted, continue to wear TLSO and pain management, will follow-up outpatient.  Did have acute respiratory failure requiring supplemental oxygen though discharged on room air with incentive spirometry encouraged.  No other changes noted, [facility].     CODE STATUS/ADVANCE DIRECTIVES DISCUSSION:  Prior  DNR / DNI  ALLERGIES:   Allergies   Allergen Reactions     Fentanyl Nausea and Vomiting     VERY sensitive to most narcotics if not all.     Morphine And Related Nausea      PAST MEDICAL HISTORY:   Past Medical History:   Diagnosis Date     Acute cholecystitis 3/23/2018     Acute kidney injury (H24) 9/10/2017     Arthritis      Dehydration 9/4/2017     Diabetes type 2, controlled (H)      Elevated lactic acid level 9/10/2017     GERD (gastroesophageal reflux disease)      History of renal calculi      HTN, goal below 140/90      Hyperlipidaemia LDL goal < 100      Hypokalemia 9/3/2017     Hypothyroid      Insomnia      Left carotid stenosis      Migraine      Motion sickness      PONV (postoperative nausea and vomiting)      Sciatica of  right side 6/28/2013     Type 2 diabetes mellitus without complication, without long-term current use of insulin (H) 2/16/2017      PAST SURGICAL HISTORY:   has a past surgical history that includes fracture tx, ankle rt/lt; REVISE MEDIAN N/CARPAL TUNNEL SURG; CYSTOURETHROSCOPY W/ URETEROSCOPY &/OR PYELOSCOPY; W/ LITHOTRIPSY; Bunionectomy; Colonoscopy (7/12/2013); Inject joint sacroiliac (4/10/2014); cataract iol, rt/lt (Bilateral, 2017); Laparoscopic cholecystectomy (N/A, 3/24/2018); Inject epidural lumbar (Right, 12/27/2018); Colonoscopy with CO2 insufflation (N/A, 1/31/2019); Colonoscopy (N/A, 1/31/2019); Inject joint sacroiliac (Left, 4/11/2019); Inject epidural transforaminal (Bilateral, 5/27/2021); Inject epidural transforaminal (Right, 6/17/2022); Inject joint sacroiliac (Right, 3/3/2023); Inject epidural transforaminal (Right, 7/13/2023); Fusion, Spine, Lumbar, 3 Or More Levels, Posterior Approach, Robotic-Assisted (N/A, 10/25/2023); IR CVC Tunnel Placement > 5 Yrs of Age (10/30/2023); and IR CVC Tunnel Removal Right (11/24/2023).  FAMILY HISTORY: family history includes Blood Disease in her brother; Breast Cancer in her mother; Cardiovascular in her brother and father; Cerebrovascular Disease in her brother; Diabetes in her brother, brother, and father; Hypertension in her brother.  SOCIAL HISTORY:   reports that she has never smoked. She has never used smokeless tobacco. She reports current alcohol use. She reports that she does not use drugs.  Patient's living condition: lives alone    Post Discharge Medication Reconciliation Status:   MED REC REQUIRED  Post Medication Reconciliation Status:  Discharge medications reconciled and changed, see notes/orders       Current Outpatient Medications   Medication Sig     acetaminophen (TYLENOL) 500 MG tablet Take 1,000 mg by mouth every 6 hours as needed for mild pain     blood glucose (ACCU-CHEK GUIDE) test strip Use to test blood sugar 1 times daily or as  "directed. (Patient not taking: Reported on 2/4/2024)     blood glucose monitoring (ACCU-CHEK FASTCLIX) lancets USE TO TEST BLOOD SUGARS ONCE DAILY     cholestyramine (QUESTRAN) 4 g packet DISSOLVE AND TAKE ONE PACKET BY MOUTH TWICE A DAY (Patient taking differently: Take 1 packet by mouth 2 times daily)     diltiazem ER COATED BEADS (CARDIZEM CD/CARTIA XT) 180 MG 24 hr capsule Take 1 capsule (180 mg) by mouth daily     Incontinence Supply Disposable (PROTECTIVE UNDERWEAR SUPER LG) MISC 3 each daily     levothyroxine (SYNTHROID/LEVOTHROID) 100 MCG tablet Take 1 tablet (100 mcg) by mouth daily     Lidocaine (LIDOCARE) 4 % Patch Place 1 patch onto the skin every 24 hours To prevent lidocaine toxicity, patient should be patch free for 12 hrs daily. (Patient not taking: Reported on 2/8/2024)     order for DME Equipment being ordered: Nike shoes and insoles     oxyBUTYnin ER (DITROPAN XL) 5 MG 24 hr tablet Take 1 tablet (5 mg) by mouth daily (Patient taking differently: Take 5 mg by mouth at bedtime)     pantoprazole (PROTONIX) 40 MG EC tablet Take 1 tablet (40 mg) by mouth daily     polyethylene glycol (MIRALAX) 17 g packet Take 17 g by mouth daily Hold for loose stools     pravastatin (PRAVACHOL) 40 MG tablet Take 1 tablet (40 mg) by mouth daily (Patient taking differently: Take 40 mg by mouth at bedtime)     No current facility-administered medications for this visit.       ROS:  10 point ROS of systems including Constitutional, Eyes, Respiratory, Cardiovascular, Gastroenterology, Genitourinary, Integumentary, Musculoskeletal, Psychiatric were all negative except for pertinent positives noted in my HPI.    Vitals:  BP (!) 169/85   Pulse 72   Temp 97.8  F (36.6  C)   Resp 18   Ht 1.575 m (5' 2\")   Wt 94.8 kg (209 lb)   LMP  (LMP Unknown)   SpO2 93%   BMI 38.23 kg/m    Exam:  GENERAL APPEARANCE:  Alert, in no distress, oriented, cooperative, denies pain  ENT:  Mouth and posterior oropharynx normal, moist mucous " membranes, normal hearing acuity  NECK:  No adenopathy,masses or thyromegaly  RESP:  lungs clear to auscultation , no respiratory distress  CV:  regular rate and rhythm, no murmur, rub, or gallop, no edema  ABDOMEN:  normal bowel sounds, soft, nontender, no hepatosplenomegaly or other masses  M/S:   wearing TLSO, able to move all extremities  SKIN:  Inspection of skin and subcutaneous tissue baseline  NEURO:   No focal deficits  PSYCH:  oriented X 3    Lab/Diagnostic data:  Most Recent 3 CBC's:  Recent Labs   Lab Test 02/09/24  0730 02/06/24  1201 02/06/24  0451 02/05/24  0516 02/04/24  1715   WBC  --   --  5.5 7.5 6.6   HGB 10.3* 9.2* 8.4* 9.1* 9.3*   MCV  --   --  90 88 88   PLT  --   --  233 244 241     Most Recent 3 BMP's:  Recent Labs   Lab Test 02/09/24  0730 02/06/24  1155 02/06/24  0744 02/06/24  0451 02/05/24  0739 02/05/24  0516   *  --   --  143  --  141   POTASSIUM 3.9  --   --  3.9  --  3.8   CHLORIDE 107  --   --  109*  --  106   CO2 26  --   --  23  --  22   BUN 15.5  --   --  20.1  --  15.0   CR 1.19*  --   --  1.37*  --  1.13*   ANIONGAP 13  --   --  11  --  13   TERESA 9.9  --   --  8.7*  --  9.0   * 148* 121* 124*   < > 150*    < > = values in this interval not displayed.     Most Recent TSH and T4:  Recent Labs   Lab Test 10/31/23  0940   TSH 0.17*   T4 0.64*     Most Recent Hemoglobin A1c:  Recent Labs   Lab Test 10/25/23  1629   A1C 6.3*       ASSESSMENT/PLAN:    (Z98.1) S/P lumbar spinal fusion  Compression fx  Wearing TLSO, follow-up with [facility] (TBD)    Pain  Continue Tylenol 1000 mg every 6 hours as needed with lidocaine patch, denies pain    (I10) Essential hypertension with goal blood pressure less than 140/90  Continue Cardizem 180 mg daily, monitor blood pressure twice daily    (E11.9) Type 2 diabetes mellitus without complication, without long-term current use of insulin (H)  Diet controlled, last A1c 6.3 on 10/25.  Discontinue blood sugar checks    Overactive bladder  Follows  with urology, recently increased oxybutynin to 10 mg at bedtime    (K21.9) Gastroesophageal reflux disease without esophagitis    Continue pantoprazole 40 mg daily    Morbid obesity  Last BMI>38     (E03.9) Hypothyroidism, unspecified type  Continue Synthroid 100 mcg daily, recheck TSH with PCP    (N18.31) Stage 3a chronic kidney disease (H)  Last CR 1.19 with GFR 46 on 2/9    (D64.9) Normocytic anemia  Last Hgb 10.3 on 2/9    Constipation  Continue MiraLAX daily    HLD  Continue Questran 4 g twice daily    Orders:  Monitor blood pressure twice daily    Electronically signed by:  Reuben Landaverde NP                   Sincerely,        Reuben Landaverde NP

## 2024-02-14 ENCOUNTER — TRANSITIONAL CARE UNIT VISIT (OUTPATIENT)
Dept: GERIATRICS | Facility: CLINIC | Age: 84
End: 2024-02-14
Payer: COMMERCIAL

## 2024-02-14 DIAGNOSIS — E87.0 HYPERNATREMIA: ICD-10-CM

## 2024-02-14 DIAGNOSIS — Z98.1 S/P LUMBAR SPINAL FUSION: ICD-10-CM

## 2024-02-14 DIAGNOSIS — G89.29 CHRONIC BACK PAIN, UNSPECIFIED BACK LOCATION, UNSPECIFIED BACK PAIN LATERALITY: ICD-10-CM

## 2024-02-14 DIAGNOSIS — N32.81 OAB (OVERACTIVE BLADDER): ICD-10-CM

## 2024-02-14 DIAGNOSIS — E03.8 CENTRAL HYPOTHYROIDISM: ICD-10-CM

## 2024-02-14 DIAGNOSIS — N18.31 STAGE 3A CHRONIC KIDNEY DISEASE (H): ICD-10-CM

## 2024-02-14 DIAGNOSIS — E11.9 DIET-CONTROLLED DIABETES MELLITUS (H): ICD-10-CM

## 2024-02-14 DIAGNOSIS — T84.498A LOOSENING OF HARDWARE IN SPINE (H): Primary | ICD-10-CM

## 2024-02-14 DIAGNOSIS — S32.010D COMPRESSION FRACTURE OF L1 VERTEBRA WITH ROUTINE HEALING, SUBSEQUENT ENCOUNTER: ICD-10-CM

## 2024-02-14 DIAGNOSIS — D64.9 NORMOCYTIC ANEMIA: ICD-10-CM

## 2024-02-14 DIAGNOSIS — I10 ESSENTIAL HYPERTENSION WITH GOAL BLOOD PRESSURE LESS THAN 140/90: ICD-10-CM

## 2024-02-14 DIAGNOSIS — M54.9 CHRONIC BACK PAIN, UNSPECIFIED BACK LOCATION, UNSPECIFIED BACK PAIN LATERALITY: ICD-10-CM

## 2024-02-14 PROCEDURE — 99305 1ST NF CARE MODERATE MDM 35: CPT | Performed by: FAMILY MEDICINE

## 2024-02-14 NOTE — LETTER
"    2/14/2024        RE: Mya Hui  [address]  [facility] [address]        [facility] GERIATRICS    PRIMARY CARE PROVIDER AND CLINIC:  Kisha Stinson PA-C, [address] [facility] [address] / [facility] [address]  Chief Complaint   Patient presents with     Hospital F/U      [place] Medical Record Number:  [id number]  Place of Service where encounter took place:  [facility] [[id number]]    Mya Hui  is a 83 year old  (1940), admitted to the above facility from  [facility]. Hospital stay 2/4/24 through 2/6/24..   HPI:    As per DNP:\"This is a 83-year-old female with past medical history of hypertension, hyperlipidemia, type 2 diabetes, CKD stage IIIa who underwent recent lumbar fusion.  Apparently CT showed loose pedicle screws and a new compression fracture, neurosurgery consulted, continue to wear TLSO and pain management, will follow-up outpatient.  Did have acute respiratory failure requiring supplemental oxygen though discharged on room air with incentive spirometry encouraged.  No other changes noted, [facility] \"      TODAY:  - Spine pain: reports better. Wears brace when is out of the bed.   -Rehab: reports going well  -anemia: appetite is fine  =======================================================  CODE STATUS/ADVANCE DIRECTIVES DISCUSSION:  Prior  DNR / DNI  ALLERGIES:   Allergies   Allergen Reactions     Fentanyl Nausea and Vomiting     VERY sensitive to most narcotics if not all.     Morphine And Related Nausea      PAST MEDICAL HISTORY:   Past Medical History:   Diagnosis Date     Acute cholecystitis 3/23/2018     Acute kidney injury (H24) 9/10/2017     Arthritis      Dehydration 9/4/2017     Diabetes type 2, controlled (H)      Elevated lactic acid level 9/10/2017     GERD (gastroesophageal reflux disease)      History of renal calculi      HTN, goal below 140/90      " Hyperlipidaemia LDL goal < 100      Hypokalemia 9/3/2017     Hypothyroid      Insomnia      Left carotid stenosis      Migraine      Motion sickness      PONV (postoperative nausea and vomiting)      Sciatica of right side 6/28/2013     Type 2 diabetes mellitus without complication, without long-term current use of insulin (H) 2/16/2017      PAST SURGICAL HISTORY:   has a past surgical history that includes fracture tx, ankle rt/lt; REVISE MEDIAN N/CARPAL TUNNEL SURG; CYSTOURETHROSCOPY W/ URETEROSCOPY &/OR PYELOSCOPY; W/ LITHOTRIPSY; Bunionectomy; Colonoscopy (7/12/2013); Inject joint sacroiliac (4/10/2014); cataract iol, rt/lt (Bilateral, 2017); Laparoscopic cholecystectomy (N/A, 3/24/2018); Inject epidural lumbar (Right, 12/27/2018); Colonoscopy with CO2 insufflation (N/A, 1/31/2019); Colonoscopy (N/A, 1/31/2019); Inject joint sacroiliac (Left, 4/11/2019); Inject epidural transforaminal (Bilateral, 5/27/2021); Inject epidural transforaminal (Right, 6/17/2022); Inject joint sacroiliac (Right, 3/3/2023); Inject epidural transforaminal (Right, 7/13/2023); Fusion, Spine, Lumbar, 3 Or More Levels, Posterior Approach, Robotic-Assisted (N/A, 10/25/2023); IR CVC Tunnel Placement > 5 Yrs of Age (10/30/2023); and IR CVC Tunnel Removal Right (11/24/2023).  FAMILY HISTORY: family history includes Blood Disease in her brother; Breast Cancer in her mother; Cardiovascular in her brother and father; Cerebrovascular Disease in her brother; Diabetes in her brother, brother, and father; Hypertension in her brother.  SOCIAL HISTORY:   reports that she has never smoked. She has never used smokeless tobacco. She reports current alcohol use. She reports that she does not use drugs.  Patient's living condition: lives in an assisted living facility    Post Discharge Medication Reconciliation Status:   MED REC REQUIRED  Post Medication Reconciliation Status: discharge medications reconciled and changed, per note/orders       Current  Outpatient Medications   Medication Sig     acetaminophen (TYLENOL) 500 MG tablet Take 1,000 mg by mouth every 6 hours as needed for mild pain     blood glucose (ACCU-CHEK GUIDE) test strip Use to test blood sugar 1 times daily or as directed. (Patient not taking: Reported on 2/4/2024)     blood glucose monitoring (ACCU-CHEK FASTCLIX) lancets USE TO TEST BLOOD SUGARS ONCE DAILY     cholestyramine (QUESTRAN) 4 g packet DISSOLVE AND TAKE ONE PACKET BY MOUTH TWICE A DAY (Patient taking differently: Take 1 packet by mouth 2 times daily)     diltiazem ER COATED BEADS (CARDIZEM CD/CARTIA XT) 180 MG 24 hr capsule Take 1 capsule (180 mg) by mouth daily     Incontinence Supply Disposable (PROTECTIVE UNDERWEAR SUPER LG) MISC 3 each daily     levothyroxine (SYNTHROID/LEVOTHROID) 100 MCG tablet Take 1 tablet (100 mcg) by mouth daily     Lidocaine (LIDOCARE) 4 % Patch Place 1 patch onto the skin every 24 hours To prevent lidocaine toxicity, patient should be patch free for 12 hrs daily. (Patient not taking: Reported on 2/8/2024)     order for DME Equipment being ordered: Nike shoes and insoles     oxyBUTYnin ER (DITROPAN XL) 5 MG 24 hr tablet Take 1 tablet (5 mg) by mouth daily (Patient taking differently: Take 10 mg by mouth at bedtime)     pantoprazole (PROTONIX) 40 MG EC tablet Take 1 tablet (40 mg) by mouth daily     polyethylene glycol (MIRALAX) 17 g packet Take 17 g by mouth daily Hold for loose stools     pravastatin (PRAVACHOL) 40 MG tablet Take 1 tablet (40 mg) by mouth daily (Patient taking differently: Take 40 mg by mouth at bedtime)     No current facility-administered medications for this visit.       ROS:  10 point ROS of systems including Constitutional, Eyes, Respiratory, Cardiovascular, Gastroenterology, Genitourinary, Integumentary, Musculoskeletal, Psychiatric were all negative except for pertinent positives noted in my HPI.    Vitals:  BP (!) 162/81   Pulse 75   Temp 97.8  F (36.6  C)   Resp 17   Ht 1.575  "m (5' 2\")   Wt 94.8 kg (209 lb)   LMP  (LMP Unknown)   SpO2 97%   BMI 38.23 kg/m    Exam:  GENERAL APPEARANCE:  in no distress,   RESP:  Unlabored breathing. CTA b/l.   CV:  S1S2 audible, regular HR, no murmur appreciated.   ABDOMEN:  soft, NT/ND, BS audible.   M/S:   no joint deformity noted on observation.   SKIN:  No rash noted on observation  NEURO:   No NFD appreciated on observation.   PSYCH:  affect and mood normal      Lab/Diagnostic data: Reviewed in the chart and EHR.        ASSESSMENT/PLAN:  --------------------------------  Loosening of hardware in spine  T11 and T12 (H24)  S/P lumbar spinal fusion (T11-S1, robotic, 11/2023)  Compression fracture of L1 vertebra with routine healing, subsequent encounter  Chronic back pain, unspecified back location, unspecified back pain laterality  -- Analgesia optimal with the current regimen. Continue present plan and medications.  - Followed by Orthopedic Team. Follow on the recommendations / instructions.   - Started rehab program, making a progress, continue until desired goal is achieved.     Diet-controlled diabetes mellitus (H)    Stage 3a chronic kidney disease (H)  Mild hypernatremia, Na 146  - Avoid nephrotoxic  medications. Renally dose medications. Monitor electrolytes, and dehydration status  -encourage po fluid intake.     Essential hypertension with goal blood pressure less than 140/90  NSVT  - was on lisinopril which was discontinued (11/2023) due to ATN. Also was on hydralazine .   - asymptomatic. Clinically stable.     OAB (overactive bladder), chronic:  - saw urology recently and oxybutynin increased. Monitor for AE.       Normocytic anemia  - stable. Not on supplement.     Central hypothyroidism  - TSH and FT4 low in Oct 2023. On supplement  - unsure if had work up for pituitary gland other hormone deficiency  Avoid nephrotoxic  medications. Renally dose medications. Monitor electrolytes, and dehydration " status      Orders:  NNO      Electronically signed by:  Loida Neil MD                     Sincerely,        Loida Neil MD

## 2024-02-14 NOTE — TELEPHONE ENCOUNTER
Appt with Dr. Kilpatrick was rescheduled to 2/27/24 Video Visit.  Pt had been in the hospital.      Vanessa Sosa LPN

## 2024-02-16 ENCOUNTER — DOCUMENTATION ONLY (OUTPATIENT)
Dept: NEUROSURGERY | Facility: CLINIC | Age: 84
End: 2024-02-16
Payer: COMMERCIAL

## 2024-02-16 NOTE — PROGRESS NOTES
Emailed office visit notes to [employer] per their request and need for insurance authorization.    Mansi Quinonez RN on 2/16/2024 at 10:49 AM

## 2024-02-21 ENCOUNTER — TELEPHONE (OUTPATIENT)
Dept: FAMILY MEDICINE | Facility: OTHER | Age: 84
End: 2024-02-21
Payer: COMMERCIAL

## 2024-02-21 ENCOUNTER — MEDICAL CORRESPONDENCE (OUTPATIENT)
Dept: HEALTH INFORMATION MANAGEMENT | Facility: CLINIC | Age: 84
End: 2024-02-21
Payer: COMMERCIAL

## 2024-02-21 NOTE — TELEPHONE ENCOUNTER
INCOMING FORMS    Sender: Lali    Type of Form, letter or note (What is requested?): order    How was the form received?: Fax    How should forms be returned?:  Fax : [phone]    Form placed in OOO bin for review/signature if appropriate.

## 2024-02-21 NOTE — TELEPHONE ENCOUNTER
I will not be back into office until March 4th. Please let patient know that I will address forms upon my return.    Fareed Roldan PA-C on 2/21/2024 at 10:11 AM

## 2024-02-26 ENCOUNTER — TELEPHONE (OUTPATIENT)
Dept: FAMILY MEDICINE | Facility: OTHER | Age: 84
End: 2024-02-26

## 2024-02-26 ENCOUNTER — VIRTUAL VISIT (OUTPATIENT)
Dept: FAMILY MEDICINE | Facility: OTHER | Age: 84
End: 2024-02-26
Payer: COMMERCIAL

## 2024-02-26 DIAGNOSIS — M51.369 DDD (DEGENERATIVE DISC DISEASE), LUMBAR: Primary | ICD-10-CM

## 2024-02-26 DIAGNOSIS — S32.010A CLOSED COMPRESSION FRACTURE OF BODY OF L1 VERTEBRA (H): ICD-10-CM

## 2024-02-26 DIAGNOSIS — N17.0 ACUTE KIDNEY FAILURE WITH TUBULAR NECROSIS (H): ICD-10-CM

## 2024-02-26 DIAGNOSIS — I10 ESSENTIAL HYPERTENSION: ICD-10-CM

## 2024-02-26 DIAGNOSIS — N32.81 OAB (OVERACTIVE BLADDER): ICD-10-CM

## 2024-02-26 DIAGNOSIS — D64.9 NORMOCYTIC ANEMIA: ICD-10-CM

## 2024-02-26 DIAGNOSIS — Z98.1 S/P LUMBAR SPINAL FUSION: ICD-10-CM

## 2024-02-26 PROCEDURE — 99214 OFFICE O/P EST MOD 30 MIN: CPT | Mod: 95 | Performed by: PHYSICIAN ASSISTANT

## 2024-02-26 RX ORDER — OXYBUTYNIN CHLORIDE 10 MG/1
10 TABLET, EXTENDED RELEASE ORAL AT BEDTIME
Qty: 30 TABLET | Refills: 2 | Status: SHIPPED | OUTPATIENT
Start: 2024-02-26 | End: 2024-05-22

## 2024-02-26 RX ORDER — DILTIAZEM HYDROCHLORIDE 180 MG/1
180 CAPSULE, COATED, EXTENDED RELEASE ORAL DAILY
Qty: 90 CAPSULE | Refills: 3 | Status: SHIPPED | OUTPATIENT
Start: 2024-02-26

## 2024-02-26 NOTE — PROGRESS NOTES
"Instructions Relayed to Patient by Virtual Roomer:     If patient is not active on [employer]:  Relayed the following to patient: \"Would you like us to text or email you a link to join your appointment now or when your provider is ready to initiate the virtual visit?\"    Reminded patient to ensure they were logged on to virtual visit by arrival time listed. Documented in appointment notes if patient had flexibility to initiate visit sooner than arrival time. If pediatric virtual visit, ensured pediatric patient along with parent/guardian will be present for video visit.     Patient offered the website [email or web address] and/or phone number to [employer] Help line: [phone]    Mya is a 83 year old who is being evaluated via a billable video visit.      How would you like to obtain your AVS? Mail a copy  If the video visit is dropped, the invitation should be resent by: Text to cell phone: [phone]  Will anyone else be joining your video visit? No      Assessment & Plan     ICD-10-CM    1. DDD (degenerative disc disease), lumbar  M51.36       2. OAB (overactive bladder)  N32.81 oxyBUTYnin ER (DITROPAN XL) 10 MG 24 hr tablet      3. Closed compression fracture of body of L1 vertebra (H)  S32.010A       4. Acute kidney failure with tubular necrosis (H)  N17.0       5. S/P lumbar spinal fusion  Z98.1       6. Normocytic anemia  D64.9       7. Essential hypertension  I10 diltiazem ER COATED BEADS (CARDIZEM CD/CARTIA XT) 180 MG 24 hr capsule        - Patient reports recovering well   - Using only PRN tylenol for her pain   - Has appointment with nephrology tomorrow      Will defer to Dr. Kilpatrick expertise      They had some questions about BP      Discussed BP goal at her age, at this point no indication that needs to add Hydralazine       Continue just on Diltazem   - Reviewed all medication use and side effects, refilled as needed   - Has x-rays with spine doctor next month   - Overall doing well, " labs from TCU show anemia and kidney function almost back to normal   - Reassurance given   - Continue home cares       MED REC REQUIRED  Post Medication Reconciliation Status: discharge medications reconciled, continue medications without change    Review of the result(s) of each unique test - See list          2/9/24 - BMP, Hemoglobin         2/6/24 - BMP, CBC   Diagnosis or treatment significantly limited by social determinants of health - none     30 minutes spent on the date of the encounter doing chart review, history and exam, documentation and further activities as noted above    Follow up: ALBERTO Torres-SAILAJA Orr  [facility] - Kristy Roque is a 83 year old, presenting for the following health issues:  Hospital F/U        2/26/2024     9:17 AM   Additional Questions   Roomed by Kitty RENDON   Accompanied by Earnest April 2/26/2024     9:17 AM   Patient Reported Additional Medications   Patient reports taking the following new medications None     HPI     Hospital Follow-up Visit:    Hospital/Nursing Home/ Rehab Facility: [facility]  Date of Admission: 2/4/24  Date of Discharge: 2/6/24 then TCU   Reason(s) for Admission: Lumbar fusion, hemorraghic shock, loosening of hardware      Was your hospitalization related to COVID-19? No   Problems taking medications regularly:  None  Medication changes since discharge: Oxybutynin was increased to 10 mg - 2/12/24   Problems adhering to non-medication therapy:  None    Summary of hospitalization:  [facility] discharge summary reviewed  Diagnostic Tests/Treatments reviewed.  Follow up needed: none  Other Healthcare Providers Involved in Patient s Care:         Specialist appointment - 2/27/24 new nephrology  Update since discharge: improved.     - Bone growth stimulator coming this week     - Pain controlled      XR in March, then Dr. Waite will let know what plan       Getting in and out of bed only time with pain       Has recliner lift chair     - Nephrology - tomorrow   - Oxybutynin increased - doesn't help      Goes every 1.5 hours      Legs are still swollen, not as bad        - BP medication - only      Hydralazine in nursing home in Dec      Was off for awhile, BP were fine      Stopped Hydralazine and monitor BP      130-mid 150/70's       Nurse 142/74 last week, heart rate in 60's       Left nursing home last week       Has home cares      Just with Diltiazem       Plan of care communicated with patient and family    Review of Systems  Constitutional, HEENT, cardiovascular, pulmonary, gi and gu systems are negative, except as otherwise noted.      Objective       Vitals:  No vitals were obtained today due to virtual visit.    Physical Exam   GENERAL: alert and no distress  EYES: Eyes grossly normal to inspection.  No discharge or erythema, or obvious scleral/conjunctival abnormalities.  RESP: No audible wheeze, cough, or visible cyanosis.    SKIN: Visible skin clear. No significant rash, abnormal pigmentation or lesions.  NEURO: Cranial nerves grossly intact.  Mentation and speech appropriate for age.  PSYCH: Appropriate affect, tone, and pace of words    Diagnostics: reviewed in Epic         Video-Visit Details  Type of service:  Video Visit   Video Start Time: 10:33 AM  Video End Time:10:47 AM  Originating Location (pt. Location): Home  Distant Location (provider location):  Off-site  Platform used for Video Visit: Abebe  Signed Electronically by: Kisha Stinson PA-C

## 2024-02-26 NOTE — TELEPHONE ENCOUNTER
INCOMING FORMS    Sender: Lali    Type of Form, letter or note (What is requested?): orders    How was the form received?: Fax    How should forms be returned?:  Fax : [phone]    Form placed in OOO bin for review/signature if appropriate.

## 2024-02-27 ENCOUNTER — MEDICAL CORRESPONDENCE (OUTPATIENT)
Dept: HEALTH INFORMATION MANAGEMENT | Facility: CLINIC | Age: 84
End: 2024-02-27

## 2024-02-27 ENCOUNTER — VIRTUAL VISIT (OUTPATIENT)
Dept: NEPHROLOGY | Facility: CLINIC | Age: 84
End: 2024-02-27
Payer: COMMERCIAL

## 2024-02-27 ENCOUNTER — TELEPHONE (OUTPATIENT)
Dept: FAMILY MEDICINE | Facility: OTHER | Age: 84
End: 2024-02-27

## 2024-02-27 DIAGNOSIS — B37.2 CANDIDAL INTERTRIGO: Primary | ICD-10-CM

## 2024-02-27 DIAGNOSIS — E66.812 CLASS 2 SEVERE OBESITY DUE TO EXCESS CALORIES WITH SERIOUS COMORBIDITY AND BODY MASS INDEX (BMI) OF 38.0 TO 38.9 IN ADULT (H): ICD-10-CM

## 2024-02-27 DIAGNOSIS — D64.9 ANEMIA, UNSPECIFIED TYPE: ICD-10-CM

## 2024-02-27 DIAGNOSIS — N18.31 STAGE 3A CHRONIC KIDNEY DISEASE (CKD) (H): Primary | ICD-10-CM

## 2024-02-27 DIAGNOSIS — I10 BENIGN ESSENTIAL HYPERTENSION: ICD-10-CM

## 2024-02-27 DIAGNOSIS — E66.01 CLASS 2 SEVERE OBESITY DUE TO EXCESS CALORIES WITH SERIOUS COMORBIDITY AND BODY MASS INDEX (BMI) OF 38.0 TO 38.9 IN ADULT (H): ICD-10-CM

## 2024-02-27 PROCEDURE — 99204 OFFICE O/P NEW MOD 45 MIN: CPT | Mod: 95 | Performed by: INTERNAL MEDICINE

## 2024-02-27 RX ORDER — NYSTATIN 100000 U/G
CREAM TOPICAL 2 TIMES DAILY
Qty: 30 G | Refills: 2 | Status: SHIPPED | OUTPATIENT
Start: 2024-02-27 | End: 2024-05-22

## 2024-02-27 NOTE — PROGRESS NOTES
"02/27/24    I was asked to see this patient by Fareed Roldan PA-C for evaluation of CKD following LISA event.     CC: CKD    HPI: Mya Hui is a 84 year old female who presents for evaluation of CKD. On review of her creatinine levels:  - Baseline ~ 0.8 to 1 2011 to May 2022  - LISA episodes in 2017 and 2022  - 1.11 in October 2023  - LISA in Nov 2023 with creatinine peaking at 7.18 11/6/23.   - Improved back to 1.1-1.4 most recently    The LISA episode that occurred in Nov 2023 was during a hospitalization from 10/25-12/1/23. She had undergone T11 to pelvis robotic decompression and fusion on 10/25. This surgery was complicated by development of arrhythmia with hypotension and acute blood loss leading to hemorrhagic shock. Shock liver, LISA, encephalopathy. Her LISA required hemodialysis support until 11/17/23 being her last dialysis session.     Addt hx includes DM, chronic back pain, hypertension, hypothyroidism, hyperlipidemia,     Recently hospitalized 2/4-2.6 with acute on chronic back pain. Hx of spinal fusion October 2023.    She went to rehab following her hospitalization in Dec and she is now in her apartment January. Another compression fracture in Feb - rehab again but now back to apartment. Assisted Living with hopes of getting back to independent. Appetite is good. \"Too good\". No nausea or vomiting. Urinating without difficulty. Energy could be better but ok considering circumstances. BP - today 140s/70. It has been good per her report. PCP was ok with being the 140s for now. She was previously on the hydralazine - off of it now.     - History of Hematuria: no  - Swelling: little bit - much improved with compression stockings. Comfortable.   - Hx of UTIs: no  - Hx of stones: had a kidney stone requiring intervention - years ago.   - Rashes/Joint pain: little under her breast, chronic back pain - improved.   - Family hx of kidney disease: no  - NSAID use: no, only tylenol    acetaminophen (TYLENOL) " 500 MG tablet, Take 1,000 mg by mouth every 6 hours as needed for mild pain  blood glucose (ACCU-CHEK GUIDE) test strip, Use to test blood sugar 1 times daily or as directed. (Patient not taking: Reported on 2/4/2024)  blood glucose monitoring (ACCU-CHEK FASTCLIX) lancets, USE TO TEST BLOOD SUGARS ONCE DAILY  cholestyramine (QUESTRAN) 4 g packet, DISSOLVE AND TAKE ONE PACKET BY MOUTH TWICE A DAY (Patient taking differently: Take 1 packet by mouth 2 times daily)  diltiazem ER COATED BEADS (CARDIZEM CD/CARTIA XT) 180 MG 24 hr capsule, Take 1 capsule (180 mg) by mouth daily  Incontinence Supply Disposable (PROTECTIVE UNDERWEAR SUPER LG) MISC, 3 each daily  levothyroxine (SYNTHROID/LEVOTHROID) 100 MCG tablet, Take 1 tablet (100 mcg) by mouth daily  Lidocaine (LIDOCARE) 4 % Patch, Place 1 patch onto the skin every 24 hours To prevent lidocaine toxicity, patient should be patch free for 12 hrs daily. (Patient not taking: Reported on 2/8/2024)  order for DME, Equipment being ordered: Nike shoes and insoles  oxyBUTYnin ER (DITROPAN XL) 10 MG 24 hr tablet, Take 1 tablet (10 mg) by mouth at bedtime  pantoprazole (PROTONIX) 40 MG EC tablet, Take 1 tablet (40 mg) by mouth daily  polyethylene glycol (MIRALAX) 17 g packet, Take 17 g by mouth daily Hold for loose stools  pravastatin (PRAVACHOL) 40 MG tablet, Take 1 tablet (40 mg) by mouth daily (Patient taking differently: Take 40 mg by mouth at bedtime)    No current facility-administered medications on file prior to visit.      Exam:  LMP  (LMP Unknown)   GENERAL APPEARANCE: alert and no distress  Resp - breathing is nonlabored.     Results:    No visits with results within 1 Day(s) from this visit.   Latest known visit with results is:   Lab Requisition on 02/09/2024   Component Date Value Ref Range Status    Hemoglobin 02/09/2024 10.3 (L)  11.7 - 15.7 g/dL Final    Sodium 02/09/2024 146 (H)  135 - 145 mmol/L Final    Reference intervals for this test were updated on 09/26/2023  to more accurately reflect our healthy population. There may be differences in the flagging of prior results with similar values performed with this method. Interpretation of those prior results can be made in the context of the updated reference intervals.     Potassium 02/09/2024 3.9  3.4 - 5.3 mmol/L Final    Chloride 02/09/2024 107  98 - 107 mmol/L Final    Carbon Dioxide (CO2) 02/09/2024 26  22 - 29 mmol/L Final    Anion Gap 02/09/2024 13  7 - 15 mmol/L Final    Urea Nitrogen 02/09/2024 15.5  8.0 - 23.0 mg/dL Final    Creatinine 02/09/2024 1.19 (H)  0.51 - 0.95 mg/dL Final    GFR Estimate 02/09/2024 46 (L)  >60 mL/min/1.73m2 Final    Calcium 02/09/2024 9.9  8.8 - 10.2 mg/dL Final    Glucose 02/09/2024 113 (H)  70 - 99 mg/dL Final      Lab results were reviewed and interpreted.     Assessment/Plan:   CKD Stage 3: at risk for CKD in the setting of diabetes, hypertension, obesity but her rise in creatinine correlates best with her LISA event in November. Pleased to see that she had some recovery back to near her previous baseline. Will plan to monitor kidney function to assure stability. Previously without albuminuria or hematuria but will recheck urine studies with future labs.  \Anemia: iron saturation low in late Jan - will start oral iron as tolerated for now. If remaining low or not tolerating oral iron, would consider Iv iron.   Hypertension: BP reasonable most recently - will monitor  DM: last A1C well controlled at 6.3% in October.  Obesity: encourage weight loss to help decreased work of the kidney    Patient Instructions   Trial ferrous sulfate     56 minutes spent by me on the date of the encounter doing chart review, review of test results, interpretation of tests, patient visit, and documentation     [id number] AM video visit via [employer] - offsite.   Brittni Kilpatrick DO

## 2024-02-27 NOTE — PROGRESS NOTES
Virtual Visit Details    Type of service:  Video Visit     Originating Location (pt. Location): Home    Distant Location (provider location):  Off-site  Platform used for Video Visit: Abebe

## 2024-02-27 NOTE — TELEPHONE ENCOUNTER
Called patient and notified them of the message below. Patient also has no further questions at this time.        Andra Gardner MA

## 2024-02-27 NOTE — TELEPHONE ENCOUNTER
Grace, RN with home care calling requesting Nystatin. She states patient has a possible yeast infection under both breasts. Symptoms are red area and painful.     Home care RN would like a call back with update so she can inform patient.     Would you like patient to complete a visit for this?    JOSE R PresleyN, RN

## 2024-02-27 NOTE — PATIENT INSTRUCTIONS
Trial ferrous sulfate - start with once daily dosing. If tolerating, can increase to twice a day. If any constipation or GI side effects, only take as tolerated. If not tolerating once a day, can trial taking it every other day.   Recommend labs when you go for your XRAY on March 21 for another time point.   Recommend follow-up in 3 months to assure kidney function is stable.

## 2024-02-28 LAB — GLUCOSE BLDC GLUCOMTR-MCNC: 184 MG/DL (ref 70–99)

## 2024-02-29 RX ORDER — FERROUS SULFATE 325(65) MG
325 TABLET ORAL
Qty: 90 TABLET | Refills: 3 | Status: SHIPPED | OUTPATIENT
Start: 2024-02-29

## 2024-03-01 ENCOUNTER — TELEPHONE (OUTPATIENT)
Dept: FAMILY MEDICINE | Facility: OTHER | Age: 84
End: 2024-03-01
Payer: COMMERCIAL

## 2024-03-01 NOTE — TELEPHONE ENCOUNTER
INCOMING FORMS    Sender: Lali     Type of Form, letter or note (What is requested?): order    How was the form received?: Fax    How should forms be returned?:  Fax : [phone]    Form placed in OOO bin for review/signature if appropriate.

## 2024-03-05 ENCOUNTER — MEDICAL CORRESPONDENCE (OUTPATIENT)
Dept: HEALTH INFORMATION MANAGEMENT | Facility: CLINIC | Age: 84
End: 2024-03-05
Payer: COMMERCIAL

## 2024-03-07 ENCOUNTER — PATIENT OUTREACH (OUTPATIENT)
Dept: CARE COORDINATION | Facility: CLINIC | Age: 84
End: 2024-03-07
Payer: COMMERCIAL

## 2024-03-07 NOTE — PROGRESS NOTES
Clinic Care Coordination Contact  [facility]    Clinical Data: Care Coordinator Outreach    Outreach Documentation Number of Outreach Attempt   3/7/2024  12:27 PM 1       Unable to leave message.     Plan: Care Coordinator will try to reach patient again in 1-2 business days.    Susy Key RN Care Coordination   [facility]  Email: [email or web address]  Phone: [phone]

## 2024-03-08 ENCOUNTER — NURSE TRIAGE (OUTPATIENT)
Dept: FAMILY MEDICINE | Facility: OTHER | Age: 84
End: 2024-03-08
Payer: COMMERCIAL

## 2024-03-08 ASSESSMENT — ACTIVITIES OF DAILY LIVING (ADL): DEPENDENT_IADLS:: CLEANING;COOKING;LAUNDRY;MEAL PREPARATION;MEDICATION MANAGEMENT;TRANSPORTATION

## 2024-03-08 NOTE — TELEPHONE ENCOUNTER
Consent to communicate is on file to speak with Daughter April.    Patient has had an increase in back pain since this morning.  She only take tylenol right now.    Patient had a back fracture on 2/4/24.    No new injury.  Pain is currently a 6 out of 10.  Her pain increases when she stands and walks, but reduces a little after several steps.    Daughter is concerned about going into the weekend due to her back pain increasing again.    She is wondering if patient can have something for pain in case they need it this weekend.    Please advise.    Dacia Serrato RN on 3/8/2024 at 2:39 PM      Additional Information   Negative: Passed out (i.e., fainted, collapsed and was not responding)   Negative: Shock suspected (e.g., cold/pale/clammy skin, too weak to stand, low BP, rapid pulse)   Negative: Sounds like a life-threatening emergency to the triager   Negative: Major injury to the back (e.g., MVA, fall > 10 feet or 3 meters, penetrating injury, etc.)   Negative: Pain in the upper back over the ribs (rib cage) that radiates (travels) into the chest   Negative: Pain in the upper back over the ribs (rib cage) and worsened by coughing (or clearly increases with breathing)   Negative: Back pain during pregnancy   Negative: SEVERE back pain of sudden onset and age > 60 years   Negative: SEVERE abdominal pain (e.g., excruciating)   Negative: Abdominal pain and age > 60 years   Negative: Unable to urinate (or only a few drops) and bladder feels very full   Negative: Loss of bladder or bowel control (urine or bowel incontinence; wetting self, leaking stool) of new-onset   Negative: Numbness (loss of sensation) in groin or rectal area   Negative: Pain radiates into groin, scrotum   Negative: Blood in urine (red, pink, or tea-colored)   Negative: Vomiting and pain over lower ribs of back (i.e., flank - kidney area)   Negative: Weakness of a leg or foot (e.g., unable to bear weight, dragging foot)   Negative: Patient sounds  very sick or weak to the triager   Negative: Fever > 100.4 F (38.0 C) and flank pain   Negative: Pain or burning with passing urine (urination)   Negative: SEVERE back pain (e.g., excruciating, unable to do any normal activities) and not improved after pain medicine and CARE ADVICE   Negative: Numbness in an arm or hand (i.e., loss of sensation) and upper back pain   Negative: Numbness in a leg or foot (i.e., loss of sensation)   Negative: High-risk adult (e.g., history of cancer, history of HIV, or history of IV Drug Use)   Negative: Soft tissue infection (e.g., abscess, cellulitis) or other serious infection (e.g., bacteremia) in last 2 weeks   Negative: Painful rash with multiple small blisters grouped together (i.e., dermatomal distribution or 'band' or 'stripe')   Negative: Pain radiates into the thigh or further down the leg, and in both legs   Negative: Age > 50 and no history of prior similar back pain   Negative: MODERATE back pain (e.g., interferes with normal activities) and present > 3 days   Negative: Pain radiates into the thigh or further down the leg   Negative: Patient wants to be seen   Negative: Back pain lasts > 2 weeks   Negative: Back pain is a chronic symptom (recurrent or ongoing AND lasting > 4 weeks)    Protocols used: Back Pain-A-[place]

## 2024-03-08 NOTE — PROGRESS NOTES
Clinic Care Coordination Contact    OUTREACH    Referral Information:  Referral Source: IP Handoff    Chief Complaint   Patient presents with    Clinic Care Coordination - Homecare/TCU     RN CC- TCU discharge        Universal Utilization: 75.7% Admission or ED Risk     Utilization      No Show Count (past year)  3             ED Visits  1             Hospital Admissions  3                    Current as of: 3/7/2024  4:43 PM              Clinical Concerns:  Current Medical Concerns:  RN CC called and spoke to the patient. She recently discharged from the TCU and is now home. She said things have been going well since getting home. She already had her follow up visit in clinic. She said she has all medications and no concerns there. Patient has no needs at this time.       Current Behavioral Concerns: None noted.       Education Provided to patient: Advised patient to call the clinic with questions or concerns. Advised to call pharmacy for refill needs.       Health Maintenance Reviewed: Due/Overdue There are no preventive care reminders to display for this patient.    Clinical Pathway: None    Medication Management:  Medication review status: Medications reviewed and no changes reported per patient.           Functional Status:  Dependent ADLs:: Ambulation-walker, Bathing, Toileting  Dependent IADLs:: Cleaning, Cooking, Laundry, Meal Preparation, Medication Management, Transportation    Living Situation:       Lifestyle & Psychosocial Needs:    Social Determinants of Health     Food Insecurity: Low Risk  (1/16/2024)    Food Insecurity     Within the past 12 months, did you worry that your food would run out before you got money to buy more?: No     Within the past 12 months, did the food you bought just not last and you didn t have money to get more?: No   Depression: Not at risk (2/27/2024)    PHQ-2     PHQ-2 Score: 0   Housing Stability: Low Risk  (1/16/2024)    Housing Stability     Do you have housing? : Yes      Are you worried about losing your housing?: No   Tobacco Use: Low Risk  (2/27/2024)    Patient History     Smoking Tobacco Use: Never     Smokeless Tobacco Use: Never     Passive Exposure: Not on file   Financial Resource Strain: Low Risk  (1/16/2024)    Financial Resource Strain     Within the past 12 months, have you or your family members you live with been unable to get utilities (heat, electricity) when it was really needed?: No   Alcohol Use: Not on file   Transportation Needs: Low Risk  (1/16/2024)    Transportation Needs     Within the past 12 months, has lack of transportation kept you from medical appointments, getting your medicines, non-medical meetings or appointments, work, or from getting things that you need?: No   Physical Activity: Not on file   Interpersonal Safety: Low Risk  (10/19/2023)    Interpersonal Safety     Do you feel physically and emotionally safe where you currently live?: Yes     Within the past 12 months, have you been hit, slapped, kicked or otherwise physically hurt by someone?: No     Within the past 12 months, have you been humiliated or emotionally abused in other ways by your partner or ex-partner?: No   Stress: Not on file   Social Connections: Not on file        Resources and Interventions:  Current Resources:   Skilled Home Care Services: Skilled Nursing, Home Health Aid, Physical Therapy, Occupational Therapy  Community Resources: Home Care  Supplies Currently Used at Home: None  Equipment Currently Used at Home: walker, rolling, grab bar, toilet, grab bar, tub/shower, shower chair  Employment Status: retired     Advance Care Plan/Directive  Advanced Care Plans/Directives on file:: Yes  Status of record:: On File and Validated  Type Advanced Care Plans/Directives: Advanced Directive - On File    Referrals Placed: None    Care Plan:      Patient/Caregiver understanding: Patient/caregiver verbalized understanding and denies any additional questions or concerns at this time.  RNCC engaged in AIDET communications during encounter.        Future Appointments                In 1 week ERXR1 [facility] [place], [place]            Plan: Patient declines care coordination at this time. She agrees to call the clinic if any needs come up.     Susy Key RN Care Coordination   [facility]  [place], [place], [place]  Email: [email or web address]  Phone: [phone]

## 2024-03-09 ENCOUNTER — MEDICAL CORRESPONDENCE (OUTPATIENT)
Dept: HEALTH INFORMATION MANAGEMENT | Facility: CLINIC | Age: 84
End: 2024-03-09

## 2024-03-11 ENCOUNTER — TELEPHONE (OUTPATIENT)
Dept: FAMILY MEDICINE | Facility: OTHER | Age: 84
End: 2024-03-11
Payer: COMMERCIAL

## 2024-03-11 NOTE — TELEPHONE ENCOUNTER
Routed to provider that is not in office on Fridays.     Recommend we now call daughter and apologize that they had to wait the weekend. Get update on patient's pain.     Recommend if still in pain, that we add her virtually for an appointment tomorrow to discuss treating her pain.    Carlos Stinson PA-C  [facility]

## 2024-03-11 NOTE — TELEPHONE ENCOUNTER
C2C on file    Daughter called and requested pain medication for Mother with back pain that was triaged on 3/8/24.   Relayed   Routed to provider that is not in office on Fridays.      Recommend we now call daughter and apologize that they had to wait the weekend. Get update on patient's pain.      Recommend if still in pain, that we add her virtually for an appointment tomorrow to discuss treating her pain.     Carlos Stinson PA-C  [facility]         Appointments in Next Year      Mar 12, 2024  3:30 PM  (Arrive by 3:25 PM)  Provider Visit with Kisha Stinson PA-C  [facility] ([facility] ) [phone]     Melani Gamino RN  [facility] - Registered Nurse  Clinic Triage [place]   March 11, 2024

## 2024-03-11 NOTE — TELEPHONE ENCOUNTER
Patient Contact    Attempt # 1    RN did attempt to reach daughter . No answer. Message left for daughter to call the clinic back and ask to speak to a triage nurse.     Daina Muniz RN on 3/11/2024 at 1:44 PM

## 2024-03-12 ENCOUNTER — TELEPHONE (OUTPATIENT)
Dept: FAMILY MEDICINE | Facility: OTHER | Age: 84
End: 2024-03-12

## 2024-03-12 ENCOUNTER — MEDICAL CORRESPONDENCE (OUTPATIENT)
Dept: HEALTH INFORMATION MANAGEMENT | Facility: CLINIC | Age: 84
End: 2024-03-12

## 2024-03-12 ENCOUNTER — VIRTUAL VISIT (OUTPATIENT)
Dept: FAMILY MEDICINE | Facility: OTHER | Age: 84
End: 2024-03-12
Payer: COMMERCIAL

## 2024-03-12 DIAGNOSIS — Z53.9 DIAGNOSIS NOT YET DEFINED: Primary | ICD-10-CM

## 2024-03-12 DIAGNOSIS — Z98.1 S/P SPINAL FUSION: ICD-10-CM

## 2024-03-12 DIAGNOSIS — M51.369 DDD (DEGENERATIVE DISC DISEASE), LUMBAR: Primary | ICD-10-CM

## 2024-03-12 PROCEDURE — 99214 OFFICE O/P EST MOD 30 MIN: CPT | Mod: 95 | Performed by: PHYSICIAN ASSISTANT

## 2024-03-12 PROCEDURE — G0179 MD RECERTIFICATION HHA PT: HCPCS | Performed by: PHYSICIAN ASSISTANT

## 2024-03-12 RX ORDER — OXYCODONE HYDROCHLORIDE 5 MG/1
5 TABLET ORAL EVERY 6 HOURS PRN
Qty: 30 TABLET | Refills: 0 | Status: SHIPPED | OUTPATIENT
Start: 2024-03-12 | End: 2024-05-22

## 2024-03-12 ASSESSMENT — ENCOUNTER SYMPTOMS: BACK PAIN: 1

## 2024-03-12 NOTE — TELEPHONE ENCOUNTER
INCOMING FORMS    Sender: Lali      Type of Form, letter or note (What is requested?): order x 4    How was the form received?: Fax    How should forms be returned?:  Fax : [phone]    Form placed in JR bin for review/signature if appropriate.

## 2024-03-21 ENCOUNTER — LAB (OUTPATIENT)
Dept: LAB | Facility: OTHER | Age: 84
End: 2024-03-21
Payer: COMMERCIAL

## 2024-03-21 ENCOUNTER — ANCILLARY PROCEDURE (OUTPATIENT)
Dept: GENERAL RADIOLOGY | Facility: OTHER | Age: 84
End: 2024-03-21
Attending: NURSE PRACTITIONER
Payer: COMMERCIAL

## 2024-03-21 DIAGNOSIS — D64.9 ANEMIA, UNSPECIFIED TYPE: ICD-10-CM

## 2024-03-21 DIAGNOSIS — N18.31 STAGE 3A CHRONIC KIDNEY DISEASE (CKD) (H): ICD-10-CM

## 2024-03-21 DIAGNOSIS — Z98.1 S/P LUMBAR SPINAL FUSION: ICD-10-CM

## 2024-03-21 LAB
ALBUMIN MFR UR ELPH: 11.8 MG/DL
ALBUMIN SERPL BCG-MCNC: 4.2 G/DL (ref 3.5–5.2)
ALBUMIN UR-MCNC: NEGATIVE MG/DL
ANION GAP SERPL CALCULATED.3IONS-SCNC: 13 MMOL/L (ref 7–15)
APPEARANCE UR: CLEAR
BACTERIA #/AREA URNS HPF: ABNORMAL /HPF
BILIRUB UR QL STRIP: NEGATIVE
BUN SERPL-MCNC: 19.5 MG/DL (ref 8–23)
CALCIUM SERPL-MCNC: 9.7 MG/DL (ref 8.8–10.2)
CHLORIDE SERPL-SCNC: 107 MMOL/L (ref 98–107)
COLOR UR AUTO: YELLOW
CREAT SERPL-MCNC: 1.35 MG/DL (ref 0.51–0.95)
CREAT UR-MCNC: 69.3 MG/DL
DEPRECATED HCO3 PLAS-SCNC: 25 MMOL/L (ref 22–29)
EGFRCR SERPLBLD CKD-EPI 2021: 39 ML/MIN/1.73M2
FERRITIN SERPL-MCNC: 33 NG/ML (ref 11–328)
GLUCOSE SERPL-MCNC: 113 MG/DL (ref 70–99)
GLUCOSE UR STRIP-MCNC: NEGATIVE MG/DL
HGB BLD-MCNC: 10.4 G/DL (ref 11.7–15.7)
HGB UR QL STRIP: NEGATIVE
IRON BINDING CAPACITY (ROCHE): 385 UG/DL (ref 240–430)
IRON SATN MFR SERPL: 85 % (ref 15–46)
IRON SERPL-MCNC: 328 UG/DL (ref 37–145)
KETONES UR STRIP-MCNC: NEGATIVE MG/DL
LEUKOCYTE ESTERASE UR QL STRIP: ABNORMAL
NITRATE UR QL: NEGATIVE
PH UR STRIP: 5.5 [PH] (ref 5–7)
PHOSPHATE SERPL-MCNC: 3.8 MG/DL (ref 2.5–4.5)
POTASSIUM SERPL-SCNC: 3.6 MMOL/L (ref 3.4–5.3)
PROT/CREAT 24H UR: 0.17 MG/MG CR (ref 0–0.2)
RBC #/AREA URNS AUTO: ABNORMAL /HPF
SODIUM SERPL-SCNC: 145 MMOL/L (ref 135–145)
SP GR UR STRIP: 1.01 (ref 1–1.03)
SQUAMOUS #/AREA URNS AUTO: ABNORMAL /LPF
UROBILINOGEN UR STRIP-ACNC: 0.2 E.U./DL
WBC #/AREA URNS AUTO: ABNORMAL /HPF

## 2024-03-21 PROCEDURE — 83550 IRON BINDING TEST: CPT

## 2024-03-21 PROCEDURE — 84165 PROTEIN E-PHORESIS SERUM: CPT | Performed by: STUDENT IN AN ORGANIZED HEALTH CARE EDUCATION/TRAINING PROGRAM

## 2024-03-21 PROCEDURE — 83970 ASSAY OF PARATHORMONE: CPT

## 2024-03-21 PROCEDURE — 72080 X-RAY EXAM THORACOLMB 2/> VW: CPT | Mod: TC | Performed by: RADIOLOGY

## 2024-03-21 PROCEDURE — 82728 ASSAY OF FERRITIN: CPT

## 2024-03-21 PROCEDURE — 83540 ASSAY OF IRON: CPT

## 2024-03-21 PROCEDURE — 81001 URINALYSIS AUTO W/SCOPE: CPT

## 2024-03-21 PROCEDURE — 84155 ASSAY OF PROTEIN SERUM: CPT

## 2024-03-21 PROCEDURE — 86334 IMMUNOFIX E-PHORESIS SERUM: CPT | Performed by: STUDENT IN AN ORGANIZED HEALTH CARE EDUCATION/TRAINING PROGRAM

## 2024-03-21 PROCEDURE — 80069 RENAL FUNCTION PANEL: CPT

## 2024-03-21 PROCEDURE — 83521 IG LIGHT CHAINS FREE EACH: CPT

## 2024-03-21 PROCEDURE — 84156 ASSAY OF PROTEIN URINE: CPT

## 2024-03-21 PROCEDURE — 85018 HEMOGLOBIN: CPT

## 2024-03-21 PROCEDURE — 36415 COLL VENOUS BLD VENIPUNCTURE: CPT

## 2024-03-22 ENCOUNTER — TELEPHONE (OUTPATIENT)
Dept: NEUROSURGERY | Facility: CLINIC | Age: 84
End: 2024-03-22
Payer: COMMERCIAL

## 2024-03-22 LAB
ALBUMIN SERPL ELPH-MCNC: 3.9 G/DL (ref 3.7–5.1)
ALPHA1 GLOB SERPL ELPH-MCNC: 0.3 G/DL (ref 0.2–0.4)
ALPHA2 GLOB SERPL ELPH-MCNC: 0.9 G/DL (ref 0.5–0.9)
B-GLOBULIN SERPL ELPH-MCNC: 0.8 G/DL (ref 0.6–1)
GAMMA GLOB SERPL ELPH-MCNC: 1 G/DL (ref 0.7–1.6)
KAPPA LC FREE SER-MCNC: 2.67 MG/DL (ref 0.33–1.94)
KAPPA LC FREE/LAMBDA FREE SER NEPH: 1.38 {RATIO} (ref 0.26–1.65)
LAMBDA LC FREE SERPL-MCNC: 1.93 MG/DL (ref 0.57–2.63)
LOCATION OF TASK: NORMAL
LOCATION OF TASK: NORMAL
M PROTEIN SERPL ELPH-MCNC: 0 G/DL
PROT PATTERN SERPL ELPH-IMP: NORMAL
PROT PATTERN SERPL IFE-IMP: NORMAL
PTH-INTACT SERPL-MCNC: 44 PG/ML (ref 15–65)
TOTAL PROTEIN SERUM FOR ELP: 6.8 G/DL (ref 6.4–8.3)

## 2024-03-22 NOTE — TELEPHONE ENCOUNTER
Health Call Center    Phone Message    May a detailed message be left on voicemail: yes     Reason for Call: Other: April returned phone call and scheduled patient for 4/26 with Tete. She states that the patient had an xray appointment done yesterday and is waiting for a call back from someone to discuss those results. Please review and call back.      Action Taken: Message routed to:  Other:  Neurosurgery    Travel Screening: Not Applicable

## 2024-03-22 NOTE — TELEPHONE ENCOUNTER
Reviewed with Rhina Gregory NP as follow-up appointment is 1 month away.     Rhina Gregory NP stated we can let her know the XR is stable and that we need a follow up in clinic for post op follow up and evaluation.     Returned call to April to provide update (C2C on file), she verbalized understanding and is in agreement with plan.

## 2024-03-22 NOTE — TELEPHONE ENCOUNTER
[facility]- schedule post op follow up with XR prior per staff message. Offered 4/26 with Tete Mccullough PA-C.

## 2024-03-26 NOTE — MR AVS SNAPSHOT
After Visit Summary   3/30/2017    Mya Hui    MRN: [id number]           Patient Information     Date Of Birth          1940        Visit Information        Provider Department      3/30/2017 10:30 AM Chanell Nelson APRN [facility]        Today's Diagnoses     Preop general physical exam    -  1    Cataract, bilateral          Care Instructions      Before Your Surgery      Call your surgeon if there is any change in your health. This includes signs of a cold or flu (such as a sore throat, runny nose, cough, rash or fever).    Do not smoke, drink alcohol or take over the counter medicine (unless your surgeon or primary care doctor tells you to) for the 24 hours before and after surgery.    If you take prescribed drugs: Follow your doctor s orders about which medicines to take and which to stop until after surgery.    Eating and drinking prior to surgery: follow the instructions from your surgeon    Take a shower or bath the night before surgery. Use the soap your surgeon gave you to gently clean your skin. If you do not have soap from your surgeon, use your regular soap. Do not shave or scrub the surgery site.  Wear clean pajamas and have clean sheets on your bed.   Before Your Surgery    Call your surgeon if there is any change in your health. This includes signs of a cold or flu (such as a sore throat, runny nose, cough, rash or fever).  Do not smoke, drink alcohol or take over the counter medicine (unless your surgeon or primary care doctor tells you to) for the 24 hours before and after surgery.  If you take prescribed drugs: Follow your doctor s orders about which medicines to take and which to stop until after surgery.  Eating and drinking prior to surgery: follow the instructions from your surgeon  Take a shower or bath the night before surgery. Use the soap your surgeon gave you to gently clean your skin. If you do not have soap from your surgeon, use your  "regular soap. Do not shave or scrub the surgery site.  Wear clean pajamas and have clean sheets on your bed.         Follow-ups after your visit        Who to contact     If you have questions or need follow up information about today's clinic visit or your schedule please contact [facility] directly at [phone].  Normal or non-critical lab and imaging results will be communicated to you by [employer], letter or phone within 4 business days after the clinic has received the results. If you do not hear from us within 7 days, please contact the clinic through [employer] or phone. If you have a critical or abnormal lab result, we will notify you by phone as soon as possible.  Submit refill requests through [employer] or call your pharmacy and they will forward the refill request to us. Please allow 3 business days for your refill to be completed.          Additional Information About Your Visit        [employer] Information     [employer] lets you send messages to your doctor, view your test results, renew your prescriptions, schedule appointments and more. To sign up, go to [email or web address] . Click on \"Log in\" on the left side of the screen, which will take you to the Welcome page. Then click on \"Sign up Now\" on the right side of the page.     You will be asked to enter the access code listed below, as well as some personal information. Please follow the directions to create your username and password.     Your access code is: [email or web address]  Expires: 5/15/2017 12:13 PM     Your access code will  in 90 days. If you need help or a new code, please call your [place] clinic or [phone].        Care EveryWhere ID     This is your Care EveryWhere ID. This could be used by other organizations to access your [place] medical records  [id number]        Your Vitals Were     Pulse Temperature BMI (Body Mass Index)             80 97.2  F (36.2  C) (Tympanic) 33.28 kg/m2          Blood Pressure from Last 3 " Encounters:   03/30/17 132/80   02/14/17 110/68   11/10/16 110/70    Weight from Last 3 Encounters:   03/30/17 200 lb (90.7 kg)   02/14/17 204 lb (92.5 kg)   11/10/16 200 lb (90.7 kg)              Today, you had the following     No orders found for display       Primary Care Provider Office Phone # Fax #    Chanell Nelson MAYI [employer] [phone]       [facility]  [address] [place] [address]        Thank you!     Thank you for choosing [facility]  for your care. Our goal is always to provide you with excellent care. Hearing back from our patients is one way we can continue to improve our services. Please take a few minutes to complete the written survey that you may receive in the mail after your visit with us. Thank you!             Your Updated Medication List - Protect others around you: Learn how to safely use, store and throw away your medicines at www.disposemymeds.org.          This list is accurate as of: 3/30/17 11:54 AM.  Always use your most recent med list.                   Brand Name Dispense Instructions for use    amitriptyline 25 MG tablet    ELAVIL    90 tablet    Take 1 tablet (25 mg) by mouth At Bedtime       aspirin 81 MG chewable tablet      Take 81 mg by mouth daily.       blood glucose lancets standard    no brand specified    1 Box    Glucose test strips Use to test blood sugar 1 times daily or as directed.       blood glucose monitoring test strip    no brand specified    100 strip    Use to test blood sugars 1 times daily or as directed       diltiazem 120 MG 24 hr capsule     90 capsule    Take 1 capsule (120 mg) by mouth daily       levothyroxine 50 MCG tablet    SYNTHROID/LEVOTHROID    90 tablet    Take 1 tablet (50 mcg) by mouth daily       lisinopril-hydrochlorothiazide 20-12.5 MG per tablet    PRINZIDE/ZESTORETIC    270 tablet    TAKE TWO TABLETS BY MOUTH EVERY MORNING & 1 TABLET AT SUPPER       metFORMIN 500 MG 24 hr tablet     GLUCOPHAGE-XR    270 tablet    TAKE ONE TABLET BY MOUTH EVERY MORNING AND TAKE TWO TABLETS BY MOUTH EVERY EVENING WITH MEALS       MULTIVITAL PO      Take  by mouth. daily       oxybutynin 5 MG tablet    DITROPAN    90 tablet    Take 1 tablet (5 mg) by mouth At Bedtime       pantoprazole 40 MG EC tablet    PROTONIX    90 tablet    Take 1 tablet (40 mg) by mouth daily       potassium chloride SA 10 MEQ CR tablet    K-DUR/KLOR-CON M    90 tablet    Take 1 tablet (10 mEq) by mouth daily       pravastatin 40 MG tablet    PRAVACHOL    90 tablet    Take 1 tablet (40 mg) by mouth daily          Need for prophylactic measure

## 2024-04-17 ENCOUNTER — APPOINTMENT (OUTPATIENT)
Dept: GENERAL RADIOLOGY | Facility: CLINIC | Age: 84
End: 2024-04-17
Attending: EMERGENCY MEDICINE
Payer: COMMERCIAL

## 2024-04-17 ENCOUNTER — HOSPITAL ENCOUNTER (EMERGENCY)
Facility: CLINIC | Age: 84
Discharge: HOME | End: 2024-04-17
Attending: EMERGENCY MEDICINE | Admitting: EMERGENCY MEDICINE
Payer: COMMERCIAL

## 2024-04-17 ENCOUNTER — NURSE TRIAGE (OUTPATIENT)
Dept: FAMILY MEDICINE | Facility: OTHER | Age: 84
End: 2024-04-17
Payer: COMMERCIAL

## 2024-04-17 VITALS
HEART RATE: 69 BPM | DIASTOLIC BLOOD PRESSURE: 98 MMHG | OXYGEN SATURATION: 97 % | TEMPERATURE: 98.4 F | RESPIRATION RATE: 14 BRPM | SYSTOLIC BLOOD PRESSURE: 203 MMHG

## 2024-04-17 DIAGNOSIS — M53.3 SACROILIAC JOINT PAIN: ICD-10-CM

## 2024-04-17 PROCEDURE — 250N000011 HC RX IP 250 OP 636: Performed by: EMERGENCY MEDICINE

## 2024-04-17 PROCEDURE — 99283 EMERGENCY DEPT VISIT LOW MDM: CPT | Performed by: EMERGENCY MEDICINE

## 2024-04-17 PROCEDURE — 72200 X-RAY EXAM SI JOINTS: CPT

## 2024-04-17 PROCEDURE — 250N000013 HC RX MED GY IP 250 OP 250 PS 637: Performed by: EMERGENCY MEDICINE

## 2024-04-17 PROCEDURE — 99284 EMERGENCY DEPT VISIT MOD MDM: CPT | Performed by: EMERGENCY MEDICINE

## 2024-04-17 PROCEDURE — 250N000012 HC RX MED GY IP 250 OP 636 PS 637: Performed by: EMERGENCY MEDICINE

## 2024-04-17 RX ORDER — OXYCODONE HYDROCHLORIDE 5 MG/1
5 TABLET ORAL EVERY 6 HOURS PRN
Qty: 10 TABLET | Refills: 0 | Status: SHIPPED | OUTPATIENT
Start: 2024-04-17 | End: 2024-04-17

## 2024-04-17 RX ORDER — OXYCODONE HYDROCHLORIDE 5 MG/1
5 TABLET ORAL ONCE
Status: COMPLETED | OUTPATIENT
Start: 2024-04-17 | End: 2024-04-17

## 2024-04-17 RX ORDER — ONDANSETRON 4 MG/1
4 TABLET, ORALLY DISINTEGRATING ORAL ONCE
Status: COMPLETED | OUTPATIENT
Start: 2024-04-17 | End: 2024-04-17

## 2024-04-17 RX ORDER — FAMOTIDINE 20 MG/1
20 TABLET, FILM COATED ORAL 2 TIMES DAILY
Qty: 8 TABLET | Refills: 0 | Status: SHIPPED | OUTPATIENT
Start: 2024-04-17 | End: 2024-04-17

## 2024-04-17 RX ORDER — PREDNISONE 20 MG/1
40 TABLET ORAL ONCE
Status: COMPLETED | OUTPATIENT
Start: 2024-04-17 | End: 2024-04-17

## 2024-04-17 RX ORDER — PREDNISONE 20 MG/1
40 TABLET ORAL DAILY
Qty: 8 TABLET | Refills: 0 | Status: SHIPPED | OUTPATIENT
Start: 2024-04-18 | End: 2024-04-17

## 2024-04-17 RX ORDER — PREDNISONE 20 MG/1
40 TABLET ORAL DAILY
Qty: 8 TABLET | Refills: 0 | Status: SHIPPED | OUTPATIENT
Start: 2024-04-18 | End: 2024-05-22

## 2024-04-17 RX ORDER — OXYCODONE HYDROCHLORIDE 5 MG/1
5 TABLET ORAL EVERY 6 HOURS PRN
Qty: 10 TABLET | Refills: 0 | Status: SHIPPED | OUTPATIENT
Start: 2024-04-17 | End: 2024-05-17

## 2024-04-17 RX ORDER — FAMOTIDINE 20 MG/1
20 TABLET, FILM COATED ORAL 2 TIMES DAILY
Qty: 8 TABLET | Refills: 0 | Status: SHIPPED | OUTPATIENT
Start: 2024-04-17 | End: 2024-05-22

## 2024-04-17 RX ADMIN — ONDANSETRON 4 MG: 4 TABLET, ORALLY DISINTEGRATING ORAL at 14:20

## 2024-04-17 RX ADMIN — PREDNISONE 40 MG: 20 TABLET ORAL at 14:02

## 2024-04-17 RX ADMIN — OXYCODONE HYDROCHLORIDE 5 MG: 5 TABLET ORAL at 11:58

## 2024-04-17 ASSESSMENT — ACTIVITIES OF DAILY LIVING (ADL)
ADLS_ACUITY_SCORE: 38

## 2024-04-17 NOTE — TELEPHONE ENCOUNTER
Pt daughter calling stating her mothers back pain in worsening over the last two days     Pt is taking oxycodone and it helps she just feels like it does not get her through the 6 hours till she can take another one. Pt take tylenol 3 times a day     Pt still ice and heats back and wears brace    Pt states her pain is between 7-10/10     Yamileth Brush RN        Reason for Disposition   SEVERE back pain (e.g., excruciating, unable to do any normal activities) and not improved after pain medicine and CARE ADVICE    Additional Information   Negative: Passed out (i.e., fainted, collapsed and was not responding)   Negative: Shock suspected (e.g., cold/pale/clammy skin, too weak to stand, low BP, rapid pulse)   Negative: Sounds like a life-threatening emergency to the triager   Negative: Major injury to the back (e.g., MVA, fall > 10 feet or 3 meters, penetrating injury, etc.)   Negative: Pain in the upper back over the ribs (rib cage) that radiates (travels) into the chest   Negative: Pain in the upper back over the ribs (rib cage) and worsened by coughing (or clearly increases with breathing)   Negative: Back pain during pregnancy   Negative: SEVERE back pain of sudden onset and age > 60 years   Negative: SEVERE abdominal pain (e.g., excruciating)   Negative: Abdominal pain and age > 60 years   Negative: Unable to urinate (or only a few drops) and bladder feels very full   Negative: Loss of bladder or bowel control (urine or bowel incontinence; wetting self, leaking stool) of new-onset   Negative: Numbness (loss of sensation) in groin or rectal area   Negative: Pain radiates into groin, scrotum   Negative: Blood in urine (red, pink, or tea-colored)   Negative: Vomiting and pain over lower ribs of back (i.e., flank - kidney area)   Negative: Weakness of a leg or foot (e.g., unable to bear weight, dragging foot)   Negative: Patient sounds very sick or weak to the triager   Negative: Fever > 100.4 F (38.0 C) and flank  pain   Negative: Pain or burning with passing urine (urination)    Protocols used: Back Pain-A-[place]

## 2024-04-17 NOTE — ED NOTES
Pt given some cracker and provider in to see her. Pt states she continues to have nausea but wants to go home. Provider aware of pts BP.

## 2024-04-17 NOTE — TELEPHONE ENCOUNTER
RN called patient and daughter answered who is physically with patient. RN relayed message below. They agree and understand.     SERGO Presley, RN

## 2024-04-17 NOTE — DISCHARGE INSTRUCTIONS
Keep your neurosurgery appointment coming up.    I have prescribed prednisone for you.  Take this for 4 more days.  You can use Pepcid to help prevent stomach upset with it.  You can also use your oxycodone as a bridge until the prednisone helps your pain.    Return here if you develop new symptoms find concerning.

## 2024-04-17 NOTE — ED PROVIDER NOTES
History     chief complaint  HPI  Patient is an 84-year-old female with a history of compression fractures of her lumbar spine presenting here for right SI pain.  Patient states this started when she bent over several days ago.  It does not radiate.  Only tender to palpation and when she stands.  When she sits she is more comfortable is still feels a gnawing pain.  No falls.  No bowel or bladder incontinence, numbness/tingling/weakness in arms or legs, fevers, chills.  No midline back pain.    Review of Systems:  All organ systems below were reviewed and are negative unless indicated in the HPI.    Constitutional  Eyes  ENT  Respiratory  Cardiovascular  Gastrointestinal  Genitourinary  Musculoskeletal  Skin  Neuro    Allergies:  Allergies   Allergen Reactions    Fentanyl Nausea and Vomiting     VERY sensitive to most narcotics if not all.    Morphine And Related Nausea       Problem List:    Patient Active Problem List    Diagnosis Date Noted    Acute low back pain 02/04/2024     Priority: Medium    Acute lower UTI 02/04/2024     Priority: Medium    Normocytic anemia 02/04/2024     Priority: Medium    Acute right-sided low back pain without sciatica 02/04/2024     Priority: Medium    Closed compression fracture of body of L1 vertebra (H) 02/04/2024     Priority: Medium    Encephalopathy 11/01/2023     Priority: Medium    Acute kidney failure with tubular necrosis (H) 10/26/2023     Priority: Medium    S/P lumbar spinal fusion 10/25/2023     Priority: Medium    Chronic right-sided low back pain 02/02/2023     Priority: Medium    Impacted cerumen of right ear 01/22/2021     Priority: Medium    Balance problems 01/22/2021     Priority: Medium    Tinnitus, right 01/22/2021     Priority: Medium    Chronic venous insufficiency 11/30/2020     Priority: Medium    Diabetes mellitus with peripheral vascular disease (H) 01/20/2020     Priority: Medium    CKD (chronic kidney disease) stage 3, GFR 30-59 ml/min (H) 01/20/2020      Priority: Medium    Bilateral lower extremity edema 09/24/2019     Priority: Medium    Urinary incontinence, unspecified type 07/17/2019     Priority: Medium    Urge incontinence of urine 04/01/2019     Priority: Medium     N39.41 : Urge incontinence      S/P laparoscopic cholecystectomy 04/03/2018     Priority: Medium    Toe anomaly 04/03/2018     Priority: Medium    Cherry angioma 04/03/2018     Priority: Medium    Sliding hiatal hernia 09/11/2017     Priority: Medium    Calculus of gallbladder without cholecystitis 09/11/2017     Priority: Medium    Rotator cuff arthropathy, right 08/28/2017     Priority: Medium    Chronic right shoulder pain 08/10/2017     Priority: Medium    Right shoulder pain, unspecified chronicity 08/10/2017     Priority: Medium    Dry mouth 08/10/2017     Priority: Medium    Primary osteoarthritis involving multiple joints 06/16/2017     Priority: Medium    Cataract, bilateral 03/30/2017     Priority: Medium    Type 2 diabetes mellitus without complication, without long-term current use of insulin (H) 02/16/2017     Priority: Medium    Hypothyroidism, unspecified type 02/14/2017     Priority: Medium    Insomnia, unspecified 02/14/2017     Priority: Medium    OAB (overactive bladder) 09/07/2016     Priority: Medium    Essential hypertension with goal blood pressure less than 140/90 09/07/2016     Priority: Medium    Essential hypertension 02/05/2016     Priority: Medium    Diabetes mellitus with stage 3 chronic kidney disease (H) 02/05/2016     Priority: Medium     Overview:   5/22/15: Creatinine at [place], [place] 1.17, GFR 45  5/22/15: hgb A1C 6.6, microalbumin 7.92      Migraine without aura and without status migrainosus, not intractable 11/19/2015     Priority: Medium    Gastroesophageal reflux disease without esophagitis 11/19/2015     Priority: Medium    Hyperlipidemia 11/19/2015     Priority: Medium     Overview:   5/22/15: , Trig 165, HDL 47, LDL 68  5/18/16: , Trig  218, HDL 46, LDL55      Osteoarthritis of hip 08/21/2014     Priority: Medium     Do you wish to do the replacement in the background? yes        DDD (degenerative disc disease), lumbar 08/21/2014     Priority: Medium    Hearing aid worn 05/30/2014     Priority: Medium     Right ear      Hip pain 09/27/2013     Priority: Medium    Sciatica of right side 06/28/2013     Priority: Medium    Asymptomatic carotid artery stenosis 05/02/2013     Priority: Medium    Hyperlipidemia with target LDL less than 100      Priority: Medium     Diagnosis updated by automated process. Provider to review and confirm.      History of renal calculi      Priority: Medium    Nevus 04/10/2013     Priority: Medium     Do you wish to do the replacement in the background? yes        Arthritis      Priority: Medium        Past Medical History:    Past Medical History:   Diagnosis Date    Acute cholecystitis 3/23/2018    Acute kidney injury (H24) 9/10/2017    Arthritis     Dehydration 9/4/2017    Diabetes type 2, controlled (H)     Elevated lactic acid level 9/10/2017    GERD (gastroesophageal reflux disease)     History of renal calculi     HTN, goal below 140/90     Hyperlipidaemia LDL goal < 100     Hypokalemia 9/3/2017    Hypothyroid     Insomnia     Left carotid stenosis     Migraine     Motion sickness     PONV (postoperative nausea and vomiting)     Sciatica of right side 6/28/2013    Type 2 diabetes mellitus without complication, without long-term current use of insulin (H) 2/16/2017       Past Surgical History:    Past Surgical History:   Procedure Laterality Date    BUNIONECTOMY      Right foot    CATARACT IOL, RT/LT Bilateral 2017    COLONOSCOPY  7/12/2013    Procedure: COLONOSCOPY;  Colonoscopy;  Surgeon: Giovany Espinoza MD;  Location:  GI    COLONOSCOPY N/A 1/31/2019    Procedure: Combined Colonoscopy, Single Or Multiple Biopsy/Polypectomy By Biopsy;  Surgeon: Efren Osorio MD;  Location:  OR    COLONOSCOPY  WITH CO2 INSUFFLATION N/A 1/31/2019    Procedure: COLONOSCOPY WITH CO2 INSUFFLATION;  Surgeon: Efren Osorio MD;  Location: [facility] OR    FRACTURE TX, ANKLE RT/LT      Left ankle    FUSION, SPINE, LUMBAR, 3 OR MORE LEVELS, POSTERIOR APPROACH, ROBOTIC-ASSISTED N/A 10/25/2023    Procedure: Thoracic 11 to pelvis robotic decompression and fusion;  Surgeon: Mumtaz Waite MD;  Location: [facility] OR    HC CYSTOURETHROSCOPY W/ URETEROSCOPY &/OR PYELOSCOPY; W/ LITHOTRIPSY      HC REVISE MEDIAN N/CARPAL TUNNEL SURG      Right wrist    INJECT EPIDURAL LUMBAR Right 12/27/2018    Procedure: INJECT EPIDURAL LUMBAR right foraminal narrowing severe at L4-L5 and moderate at L5-S1;  Surgeon: Gilbert Mcclure MD;  Location: [facility] OR    INJECT EPIDURAL TRANSFORAMINAL Bilateral 5/27/2021    Procedure: Bilateral Lumbar 5-sacral 1 Epidural steroid injection;  Surgeon: Gilbert Mcclure MD;  Location: [facility] OR    INJECT EPIDURAL TRANSFORAMINAL Right 6/17/2022    Procedure: Attempted right Lumbar 4-5 and Lumbar 5-Sacral 1 epidural injections with completion of only the right Lumbar 5-Sacral1 Transforaminal Epidural Steroid Injection with fluoroscopic guidance and contrast;  Surgeon: Gilbert Mcclure MD;  Location: [facility]    INJECT EPIDURAL TRANSFORAMINAL Right 7/13/2023    Procedure: Lumbar 3-4 interlaminar epidural steroid injection using fluoroscopic guidance with contrast dye, Right;  Surgeon: Gilbert Mcclure MD;  Location: [facility]    INJECT JOINT SACROILIAC  4/10/2014    Procedure: INJECT JOINT SACROILIAC;  Sacroiliac Joint Injection;  Surgeon: Gilbert Mcclure MD;  Location: [facility]    INJECT JOINT SACROILIAC Left 4/11/2019    Procedure: INJECT JOINT SACROILIAC- LEFT;  Surgeon: Gilbert Mcclure MD;  Location: [facility]    INJECT JOINT SACROILIAC Right 3/3/2023    Procedure: Fluoroscopically-guided injection into the Right Sacroiliac joint;  Surgeon: Gilbert Mcclure MD;  Location: [facility] OR    IR CVC TUNNEL PLACEMENT > 5 YRS OF AGE  10/30/2023    IR CVC  TUNNEL REMOVAL RIGHT  11/24/2023    LAPAROSCOPIC CHOLECYSTECTOMY N/A 3/24/2018    Procedure: LAPAROSCOPIC CHOLECYSTECTOMY;  Laparoscopic Cholecystectomy;  Surgeon: Berry Allen DO;  Location: [facility]       Family History:    Family History   Problem Relation Age of Onset    Hypertension Brother     Cerebrovascular Disease Brother     Diabetes Father     Cardiovascular Father     Diabetes Brother         Borderline    Breast Cancer Mother     Diabetes Brother     Blood Disease Brother     Cardiovascular Brother         MI       Medications:    famotidine (PEPCID) 20 MG tablet  oxyCODONE (ROXICODONE) 5 MG tablet  [START ON 4/18/2024] predniSONE (DELTASONE) 20 MG tablet  acetaminophen (TYLENOL) 500 MG tablet  blood glucose (ACCU-CHEK GUIDE) test strip  blood glucose monitoring (ACCU-CHEK FASTCLIX) lancets  cholestyramine (QUESTRAN) 4 g packet  diltiazem ER COATED BEADS (CARDIZEM CD/CARTIA XT) 180 MG 24 hr capsule  ferrous sulfate (FEROSUL) 325 (65 Fe) MG tablet  Incontinence Supply Disposable (PROTECTIVE UNDERWEAR SUPER LG) MISC  levothyroxine (SYNTHROID/LEVOTHROID) 100 MCG tablet  Lidocaine (LIDOCARE) 4 % Patch  nystatin (MYCOSTATIN) 103292 UNIT/GM external cream  order for DME  oxyBUTYnin ER (DITROPAN XL) 10 MG 24 hr tablet  oxyCODONE (ROXICODONE) 5 MG tablet  pantoprazole (PROTONIX) 40 MG EC tablet  polyethylene glycol (MIRALAX) 17 g packet  pravastatin (PRAVACHOL) 40 MG tablet          Physical Exam   BP: (!) 202/88  Pulse: 60  Temp: 98.4  F (36.9  C)  Resp: 14  SpO2: 95 %    Gen: Vital signs reviewed  Eyes: Sclera white, pupils round  ENT: External ears and nares normal  Card: Regular rate and rhythm  Resp: No respiratory distress. Lungs clear to auscultation bilaterally  Back: No midline tenderness.  Point tenderness over the right SI joint.  No skin changes.  Extremities: Symmetric distal pulses.  Skin: No rashes  Neuro: Alert, speech normal.  2+ patellar reflexes bilaterally.  5/5 dorsiflexion of her  feet.    ED Course        Procedures                Results for orders placed or performed during the hospital encounter of 04/17/24 (from the past 24 hour(s))   XR Sacroiliac Joint 1/2 Views    Narrative    SACROILIAC JOINT ONE TO TWO VIEWS   4/17/2024 12:42 PM     HISTORY:  Tender to palpation of right SI joint.    COMPARISON: None.      Impression    IMPRESSION: Postoperative changes in the visualized spine and  extending across the SI joints. No complication evident. No clear bony  union across the SI joints. Mild degenerative changes in the SI  joints. Otherwise negative.    KYLAH ANTHONY MD         SYSTEM ID:  [id number]       Medications   oxyCODONE (ROXICODONE) tablet 5 mg (5 mg Oral $Given 4/17/24 1150)   predniSONE (DELTASONE) tablet 40 mg (40 mg Oral $Given 4/17/24 1402)   ondansetron (ZOFRAN ODT) ODT tab 4 mg (4 mg Oral $Given 4/17/24 1420)         Consultations:  None    Social Determinants of Health:  Lives in assisted living facility    Independent Review of Notes:  Reviewed telephone encounters from earlier today.  Assessments & Plan (with Medical Decision Making)       I have reviewed the nursing notes.    I have reviewed the findings, diagnosis, plan and need for follow up with the patient.      Medical Decision Making  On arrival, patient appears to be in pain.  She is hypertensive.  She is tender to palpation right over the right SI joint.  No midline tenderness.  No neurologic symptoms or signs.  No falls.  Suspect sacroiliitis.  X-ray does not reveal fracture.  Given the above medications for symptomatic relief.  Discussed options.  Prescribed 4 more days of prednisone. They feel safe going home with outpatient follow-up.  Discharged in stable condition with appropriate return precautions with instructions to keep their upcoming neurosurgery appointment.    Final diagnoses:   Sacroiliac joint pain         Raad Narayan M.D.   [facility] Emergency Department     Raad Narayan,  MD  04/17/24 [id number]     no

## 2024-04-17 NOTE — ED NOTES
Pt assisted from the wheelchair to the bed, pt needed assistance getting her legs into the cot otherwise did well with SBA.

## 2024-04-17 NOTE — ED NOTES
Patient brought to a room via W/C. She is tearful. She states she is most comfortable in the chair and will remain there at this time. Patient's daughter, Olivia states her PCP instructed them to be seen to evaluate for additional fractures. Yee Ya RN

## 2024-04-17 NOTE — TELEPHONE ENCOUNTER
Recommend ED due to 10/10 pain. May need repeat imaging due to recent surgeries and IV medication to bring down pain.     Carlos Stinson PA-C  [facility]

## 2024-04-26 ENCOUNTER — OFFICE VISIT (OUTPATIENT)
Dept: NEUROSURGERY | Facility: CLINIC | Age: 84
End: 2024-04-26
Payer: COMMERCIAL

## 2024-04-26 ENCOUNTER — ANCILLARY PROCEDURE (OUTPATIENT)
Dept: GENERAL RADIOLOGY | Facility: CLINIC | Age: 84
End: 2024-04-26
Attending: NEUROLOGICAL SURGERY
Payer: COMMERCIAL

## 2024-04-26 ENCOUNTER — NURSE TRIAGE (OUTPATIENT)
Dept: FAMILY MEDICINE | Facility: OTHER | Age: 84
End: 2024-04-26

## 2024-04-26 VITALS — SYSTOLIC BLOOD PRESSURE: 163 MMHG | OXYGEN SATURATION: 98 % | HEART RATE: 65 BPM | DIASTOLIC BLOOD PRESSURE: 85 MMHG

## 2024-04-26 DIAGNOSIS — Z98.1 S/P LUMBAR FUSION: Primary | ICD-10-CM

## 2024-04-26 PROCEDURE — 99213 OFFICE O/P EST LOW 20 MIN: CPT | Performed by: NURSE PRACTITIONER

## 2024-04-26 PROCEDURE — 72080 X-RAY EXAM THORACOLMB 2/> VW: CPT | Mod: TC | Performed by: RADIOLOGY

## 2024-04-26 ASSESSMENT — PAIN SCALES - GENERAL: PAINLEVEL: EXTREME PAIN (8)

## 2024-04-26 NOTE — TELEPHONE ENCOUNTER
Right     Nurse Triage SBAR    Is this a 2nd Level Triage? NO    Situation: 4/25 started to have increased swelling around right knee anterior and medially. Stiff now with limited ROM  Background: Arthritis, recent surgery per neurosurgery and while at post op appt. 4/26 NS recommended F/U with PCP for knee swelling.  Assessment: Patient states that she is having 7/10 right knee pain, it is swollen and hard to walk bend knee. Denies redness, warm to touch, fever, chills, sweats. Denies CP, SOB, HA  Protocol Recommended Disposition:   See PCP Within 3 Days    Recommendation: RN recommended UC as well since office closes in less than an hour. Continue home care treatment tylenol, ice, elevation, rest. Patient and daughter stated they will go to UC verbalized understanding and all questions answered.   Routed to provider    Does the patient meet one of the following criteria for ADS visit consideration? No      Melani Post ABLAJI  [facility] - Registered Nurse  Clinic Triage [place]   April 26, 2024    Reason for Disposition   MILD or MODERATE swelling (e.g., can't move joint normally, can't do usual activities) (Exceptions: Itchy, localized swelling; swelling is chronic.)    Additional Information   Negative: Followed a knee injury   Negative: [1] Followed a bee sting AND [2] localized swelling (e.g., small area of puffy or swollen skin)   Negative: [1] Followed an insect bite AND [2] localized swelling (e.g., small area of puffy or swollen skin)   Negative: Knee pain is main symptom   Negative: Swelling of calf or leg is main symptom   Negative: [1] Knee pain AND [2] fever   Negative: [1] Knee redness AND [2] fever   Negative: Patient sounds very sick or weak to the triager   Negative: [1] SEVERE pain (e.g., excruciating, unable to walk) AND [2] not improved after 2 hours of pain medicine   Negative: [1] Redness AND [2] painful when touched AND [3] no fever   Negative: [1] Red area or streak AND [2] large (> 2 in.  "or 5 cm)   Negative: [1] Thigh or calf pain AND [2] only 1 side AND [3] present > 1 hour   Negative: [1] Thigh or calf swelling AND [2] only 1 side   Negative: SEVERE swelling (e.g., can't move swollen knee at all)   Negative: Looks like a boil, infected sore, deep ulcer or other infected rash (spreading redness, pus)   Negative: [1] Redness of the skin AND [2] no fever    Answer Assessment - Initial Assessment Questions  1. LOCATION: \"Where is the swelling located?\"  (e.g., left, right, both knees)      Inside of right knee,  2. ONSET: \"When did the swelling start?\" \"Does it come and go, or is it there all the time?\"   4/25  3. SWELLING: \"How bad is the swelling?\" Or, \"How large is it?\" (e.g., mild, moderate, severe; size of localized swelling)     - NONE: No joint swelling.    - LOCALIZED: Localized; small area of puffy or swollen skin (e.g., insect bite, skin irritation).    - MILD: Joint looks or feels mildly swollen or puffy.    - MODERATE: Swollen; interferes with normal activities (e.g., work or school); can't move joint normally (bend and straighten completely); may be limping.    - SEVERE: Very swollen; can't move swollen joint at all; limping a lot or unable to walk.    moderate    4. PAIN: \"Is there any pain?\" If Yes, ask: \"How bad is it?\" (Scale 1-10; or mild, moderate, severe)    - NONE (0): no pain.    - MILD (1-3): doesn't interfere with normal activities.     - MODERATE (4-7): interferes with normal activities (e.g., work or school) or awakens from sleep, limping.     - SEVERE (8-10): excruciating pain, unable to do any normal activities, unable to walk.   7/10  5. SETTING: \"Has there been any recent work, exercise or other activity that involved that part of the body?\"   no  6. AGGRAVATING FACTORS: \"What makes the knee swelling worse?\" (e.g., walking, climbing stairs, running)   All of those  7. ASSOCIATED SYMPTOMS: \"Is there any pain or redness?\"  no  8. OTHER SYMPTOMS: \"Do you have any other " "symptoms?\" (e.g., chest pain, difficulty breathing, fever, calf pain)no      9. PREGNANCY: \"Is there any chance you are pregnant?\" \"When was your last menstrual period?\"  *No Answer*n/a    Protocols used: Knee Swelling-A-AH    "

## 2024-04-26 NOTE — PATIENT INSTRUCTIONS
Okay to gradually wean out of brace as tolerated.    Continue to use Orthofix bone stimulator as instructed.    Okay to continue to work with physical therapy.    Pain control measures as needed.    Follow-up in 6 months with repeat x-ray prior to appointment.      Please call our clinic with any questions or concerns.

## 2024-04-26 NOTE — LETTER
4/26/2024         RE: Mya Hui  [address]  [place] [address]        Dear Colleague,    Thank you for referring your patient, Mya Hui, to the [facility] [place]. Please see a copy of my visit note below.    [facility] Neurosurgery Follow-Up:     HPI: 6 months s/p T11 to pelvis robotic decompression and fusion with Dr. Waite.  Patient presents with daughter.  Recent ED visit on 4/17/2024 for increased right SI joint pain.  Patient and daughter believe this pain is related to wearing TLSO brace.  ED prescribed a course of oral steroids which has been helpful.  Today, patient is doing well. Denies back pain, radicular symptoms, or incision concerns.  She started using Orthofix bone stimulator in February.  She takes oxycodone as needed.  She continues to work with home care physical therapy at her assisted living facility.  She has been wearing her brace when out of bed.  She is hoping to wean out of her brace at this time.  Patient also reports acute right knee pain.    Exam:  Constitutional:  Alert, well nourished, NAD.  HEENT: Normocephalic, atraumatic.   Pulm:  Without shortness of breath   CV:  No pitting edema of BLE.      BP (!) 163/85   Pulse 65   LMP  (LMP Unknown)   SpO2 98%     Neurological:  Awake  Alert  Oriented x 3  Motor exam: Moves BLE equally with appropriate strength  Sensation intact throughout BLE  Ambulates with walker  Incision: Well healed     Imaging: AP and lateral views reveal stable and intact hardware     Assessment/Plan:   6 months s/p T11 to pelvis robotic decompression and fusion     -Continue with routine post op care plan   -Okay to gradually wean out of TLSO brace as tolerated  -Continue to use Orthofix bone stimulator as instructed.  Informed patient and daughter that we typically recommend to utilize the stimulator for 6 to 9 months postop.  -Continue to work with physical therapy at assisted living  facility  -Follow-up in 6 months with repeat x-ray prior to appointment  -Call our clinic for any incisional concerns, increased pain, new symptoms, or any other post operative questions or concerns  -Follow-up with PCP to further evaluate right knee pain    Patient and daughter voiced understanding and agreement.      Tete Mccullough CNP  [facility]  Tel [phone]  Pager [phone]      Again, thank you for allowing me to participate in the care of your patient.        Sincerely,        Ttee Mccullough, NP

## 2024-04-26 NOTE — PROGRESS NOTES
[facility] Neurosurgery Follow-Up:     HPI: 6 months s/p T11 to pelvis robotic decompression and fusion with Dr. Waite.  Patient presents with daughter.  Recent ED visit on 4/17/2024 for increased right SI joint pain.  Patient and daughter believe this pain is related to wearing TLSO brace.  ED prescribed a course of oral steroids which has been helpful.  Today, patient is doing well. Denies back pain, radicular symptoms, or incision concerns.  She started using Orthofix bone stimulator in February.  She takes oxycodone as needed.  She continues to work with home care physical therapy at her assisted living facility.  She has been wearing her brace when out of bed.  She is hoping to wean out of her brace at this time.  Patient also reports acute right knee pain.    Exam:  Constitutional:  Alert, well nourished, NAD.  HEENT: Normocephalic, atraumatic.   Pulm:  Without shortness of breath   CV:  No pitting edema of BLE.      BP (!) 163/85   Pulse 65   LMP  (LMP Unknown)   SpO2 98%     Neurological:  Awake  Alert  Oriented x 3  Motor exam: Moves BLE equally with appropriate strength  Sensation intact throughout BLE  Ambulates with walker  Incision: Well healed     Imaging: AP and lateral views reveal stable and intact hardware     Assessment/Plan:   6 months s/p T11 to pelvis robotic decompression and fusion     -Continue with routine post op care plan   -Okay to gradually wean out of TLSO brace as tolerated  -Continue to use Orthofix bone stimulator as instructed.  Informed patient and daughter that we typically recommend to utilize the stimulator for 6 to 9 months postop.  -Continue to work with physical therapy at assisted living facility  -Follow-up in 6 months with repeat x-ray prior to appointment  -Call our clinic for any incisional concerns, increased pain, new symptoms, or any other post operative questions or concerns  -Follow-up with PCP to further evaluate right knee pain    Patient and daughter  voiced understanding and agreement.      Tete Mccullough [facility]  Tel [phone]  Pager [phone]

## 2024-04-26 NOTE — NURSING NOTE
"Mya Hui is a 84 year old female who presents for:  Chief Complaint   Patient presents with    RECHECK     S/p lumbar spinal fusion (10/25/23)        Initial Vitals:  BP (!) 163/85   Pulse 65   LMP  (LMP Unknown)   SpO2 98%  Estimated body mass index is 38.23 kg/m  as calculated from the following:    Height as of 2/13/24: 5' 2\" (1.575 m).    Weight as of 2/13/24: 209 lb (94.8 kg).. There is no height or weight on file to calculate BSA. BP completed using cuff size: large  Extreme Pain (8)    Nursing Comments:       Kathi Vergara    "

## 2024-05-04 DIAGNOSIS — K21.9 GASTROESOPHAGEAL REFLUX DISEASE WITHOUT ESOPHAGITIS: ICD-10-CM

## 2024-05-06 RX ORDER — PANTOPRAZOLE SODIUM 40 MG/1
40 TABLET, DELAYED RELEASE ORAL DAILY
Qty: 30 TABLET | Refills: 2 | Status: SHIPPED | OUTPATIENT
Start: 2024-05-06 | End: 2024-05-22

## 2024-05-17 ENCOUNTER — TELEPHONE (OUTPATIENT)
Dept: FAMILY MEDICINE | Facility: OTHER | Age: 84
End: 2024-05-17
Payer: COMMERCIAL

## 2024-05-17 DIAGNOSIS — Z98.1 S/P SPINAL FUSION: Primary | ICD-10-CM

## 2024-05-17 DIAGNOSIS — M51.369 DDD (DEGENERATIVE DISC DISEASE), LUMBAR: ICD-10-CM

## 2024-05-17 RX ORDER — OXYCODONE HYDROCHLORIDE 5 MG/1
5 TABLET ORAL EVERY 6 HOURS PRN
Qty: 10 TABLET | Refills: 0 | Status: SHIPPED | OUTPATIENT
Start: 2024-05-17 | End: 2024-05-22

## 2024-05-17 NOTE — TELEPHONE ENCOUNTER
Last seen 3/12/24 VV      Pain in back, back surgery in 9/23, DDD    Requesting refill of oxycodone medication for chronic back pain. Denies new or worsening pain.    Melani Gamino RN  [facility] - Registered Nurse  Clinic Triage [place]   May 17, 2024

## 2024-05-17 NOTE — TELEPHONE ENCOUNTER
Per [facility] ok to refill once.   Needs to follow-up with [facility] if she is going to remain on this.     BRANDEN MaynardC

## 2024-05-17 NOTE — TELEPHONE ENCOUNTER
I am in agreement with recommendation for urgent care evaluation. I have provided a list of local UCs below that the patient can utilize. Can schedule follow up with PCP if patient would like.     Fareed Roldan PA-C on 5/17/2024 at 1:16 PM      [facility]   Urgent care center  [address] [facility] [address]  [phone]  8am - 5pm    [facility]   Urgent care center  [address] [facility] [address]  [phone]  8am to 7pm    [facility]  [address] [place] [address]  [phone]  7am - 6pm    [facility]  Address: [address] [place] [address]  Phone: [phone]  10am - 8pm

## 2024-05-17 NOTE — TELEPHONE ENCOUNTER
Phoned patient and her daughter April (C2C on file), answered the phone.  She stated the message from the documentation as stated below was taken care of and they decided if patient's leg started swelling as it was earlier last month, they would go to go to the Urgent Care.  She stated the original message for this recommendation was from April 26, 2024.  She stated patient has been elevating and icing her leg which has been helping.  She stated patient was doing ok at this time and she will call to schedule a follow up visit if needed.    Advised April to call back with any further questions or concerns.  April stated understanding.    Julieta Mckinley RN

## 2024-05-22 ENCOUNTER — VIRTUAL VISIT (OUTPATIENT)
Dept: FAMILY MEDICINE | Facility: OTHER | Age: 84
End: 2024-05-22
Payer: COMMERCIAL

## 2024-05-22 DIAGNOSIS — N32.81 OAB (OVERACTIVE BLADDER): ICD-10-CM

## 2024-05-22 DIAGNOSIS — K21.9 GASTROESOPHAGEAL REFLUX DISEASE WITHOUT ESOPHAGITIS: ICD-10-CM

## 2024-05-22 DIAGNOSIS — M25.561 CHRONIC PAIN OF RIGHT KNEE: ICD-10-CM

## 2024-05-22 DIAGNOSIS — G89.29 CHRONIC PAIN OF RIGHT KNEE: ICD-10-CM

## 2024-05-22 DIAGNOSIS — M51.369 DDD (DEGENERATIVE DISC DISEASE), LUMBAR: ICD-10-CM

## 2024-05-22 DIAGNOSIS — Z98.1 S/P SPINAL FUSION: Primary | ICD-10-CM

## 2024-05-22 PROCEDURE — 99442 PR PHYSICIAN TELEPHONE EVALUATION 11-20 MIN: CPT | Mod: 93 | Performed by: PHYSICIAN ASSISTANT

## 2024-05-22 RX ORDER — PANTOPRAZOLE SODIUM 40 MG/1
40 TABLET, DELAYED RELEASE ORAL DAILY
Qty: 90 TABLET | Refills: 3 | Status: SHIPPED | OUTPATIENT
Start: 2024-05-22

## 2024-05-22 RX ORDER — OXYCODONE HYDROCHLORIDE 5 MG/1
5 TABLET ORAL EVERY 6 HOURS PRN
Qty: 90 TABLET | Refills: 0 | Status: SHIPPED | OUTPATIENT
Start: 2024-05-22 | End: 2024-08-19

## 2024-05-22 RX ORDER — OXYBUTYNIN CHLORIDE 10 MG/1
10 TABLET, EXTENDED RELEASE ORAL AT BEDTIME
Qty: 90 TABLET | Refills: 3 | Status: SHIPPED | OUTPATIENT
Start: 2024-05-22

## 2024-05-22 NOTE — PROGRESS NOTES
Instructions Relayed to Patient by Virtual Roomer:     Patient instructed that provider will initiate telephone visit via phone call      Patient Confirmed they will join visit via: Provider to call patient for telephone visit   Reminded patient to ensure they were logged on to virtual visit by arrival time listed.   Asked if patient has flexibility to initiate visit sooner than arrival time: patient is unable to initiate visit earlier than arrival time     If pediatric virtual visit, ensured pediatric patient along with parent/guardian will be present for video visit.     Patient offered the website [email or web address] and/or phone number to [employer] Help line: [phone]    Mya is a 84 year old who is being evaluated via a billable telephone visit.    What phone number would you like to be contacted at?   See demographics   How would you like to obtain your AVS? [employer]  Originating Location (pt. Location): Home    Distant Location (provider location):  Off-site    Assessment & Plan     ICD-10-CM    1. S/P spinal fusion  Z98.1 oxyCODONE (ROXICODONE) 5 MG tablet      2. DDD (degenerative disc disease), lumbar  M51.36 oxyCODONE (ROXICODONE) 5 MG tablet      3. Chronic pain of right knee  M25.561 Orthopedic  Referral    G89.29       4. OAB (overactive bladder)  N32.81 oxyBUTYnin ER (DITROPAN XL) 10 MG 24 hr tablet      5. Gastroesophageal reflux disease without esophagitis  K21.9 pantoprazole (PROTONIX) 40 MG EC tablet        1 & 2.   - Patient is s/p 2 back surgeries in the past few months with compression fracture and loosening of hardware      Saw neurosurgery in interim, back brace weaning almost complete, goes back in 6 months for repeat x-rays   - Has been doing oxycodone 5 mg at bedtime     Really doesn't need during the day unless out of the house too much  - OK to continue since only using 5 mg   - Discussed use and side effects including sedation, addiction, and  constipation, use sparingly   -  reviewed, no concerns   - OK for refill x1   - OK to continue to use Tylenol PRN   - Recheck 6 months or PRN     3. Right knee   - Reports getting more painful, was worked up in 2022 but wasn't helpful      Reviewed x-ray and note from ortho, said it was coming from her back      Recommend 2nd opinion due to symptoms   - Referral placed     4 & 5. Reviewed medication use and side effects, refilled as needed         Review of the result(s) of each unique test - See list   Assessment requiring an independent historian(s) - family - Daughter April 8/31/22 - XR right knee   Diagnosis or treatment significantly limited by social determinants of health - None     30 minutes spent on the date of the encounter doing chart review, history and exam, documentation and further activities as noted above    The patient indicates understanding of these issues and agrees with the plan.    Follow up: 6 months or PRN     Carlos Torres-SAILAJA Orr  [facility] [place]       Wan Roque is a 84 year old, presenting for the following health issues:  Recheck Medication        5/22/2024     7:18 AM   Additional Questions   Roomed by Kaden RENDON     History of Present Illness       Reason for visit:  Med Check        Medication Followup of   oxyCODONE (ROXICODONE) 5 MG tablet   Taking Medication as prescribed: yes - On average taking once a day  Side Effects:  None  Medication Helping Symptoms:  yes    - After took medication last night then slept really good   - Got inflammation in back, prednisone for 1 week helped   - Had recheck with neurosurgery      Weaned out of brace, just wears when outside      Recheck x-rays in 6 months   - Average 1 at night only      Very rare to need during the day, only if out of the house     - Right knee      Hurts to stand up and bend it, but walking is ok      Saw Dr. Choe awhile ago, said nothing they can do       Swelling          Review of Systems  Constitutional, HEENT, cardiovascular, pulmonary, gi and gu systems are negative, except as otherwise noted.      Objective       Vitals:  No vitals were obtained today due to virtual visit.    Physical Exam   General: Alert and no distress //Respiratory: No audible wheeze, cough, or shortness of breath // Psychiatric:  Appropriate affect, tone, and pace of words      Diagnostics: none       Phone call duration: 15 minutes  Signed Electronically by: Kisha Torres-SAILAJA Orr

## 2024-05-30 NOTE — PROGRESS NOTES
Mya Hui  :  1940  DOS: 2024  MRN: [id number]  PCP: Kisha Stinson    Sports Medicine Clinic Visit      HPI  Mya Hui is a 84 year old female who is seen in consultation at the request of  Kisha Stinson PA-C presenting with right knee pain.    - Mechanism of Injury:    - No inciting injury  - Pertinent history and prior evaluations:    - Hx of chronic low back pain and has gotten multiple epidural and SI joint injections, with a recent compression fracture treated with a T11 to pelvis decompression and fusion with Dr. Waite on 10/25/2023.  Using a back brace and on chronic oxycodone for the low back.    - Recently seen in the emergency department on 2024 for right-sided sacroiliac joint pain possibly related to wearing the TLSO brace, which she wears at all times when out of bed.  Followed up with neurosurgery on 2024.  She was given oral steroids which were helpful.  Denied radiculopathy pain or incision concerns at that time.  She has been using an Orthofix bone stimulator since February.  Home care physical therapy at her assisted living facility.  Neurosurgery gave the okay to gradually wean out of the TLSO brace as tolerated, continue to use the bone stimulator, and follow-up in 6 months.  Did not feel the knee was involved with the back.    - XR R Knee 2023 shows mild degenerative changes of the patellofemoral compartment, small knee joint effusion, no acute fractures or dislocations.  Osteopenia present.    - Chronic right knee pain with workup in . Notes from 9/15/2022 and 2022 with Dr. Choe. Belief was that her knee pain was coming from her back at that time.     - Pain Character:    - Location:  medial right knee pain without radiation  - Character:  aching and some sharpness  - Duration:  5 months  - Course:  worsening  - Endorses:    - knee pain has increased as she has weaned out of her lumbar back brace post back  surgery, pain, weakness  - Denies:    - numbness, tingling  - Alleviating factors:    - ice pack, sitting in her recliner  - Aggravating factors:    - getting in and out of a car, knee flexion, sit to stand transition  - Other treatments tried:    - ice pack, heat pack, oxycodone at night    - Patient Goals:    - discuss treatment options  - Social History:   - Retired.       Review of Systems  Musculoskeletal: as above  Remainder of review of systems is negative including constitutional, CV, pulmonary, GI, Skin and Neurologic except as noted in HPI or medical history.    Past Medical History:   Diagnosis Date    Acute cholecystitis 3/23/2018    Acute kidney injury (H24) 9/10/2017    Arthritis     Dehydration 9/4/2017    Diabetes type 2, controlled (H)     Elevated lactic acid level 9/10/2017    GERD (gastroesophageal reflux disease)     History of renal calculi     HTN, goal below 140/90     Hyperlipidaemia LDL goal < 100     Hypokalemia 9/3/2017    Hypothyroid     Insomnia     Left carotid stenosis     Migraine     Motion sickness     PONV (postoperative nausea and vomiting)     Sciatica of right side 6/28/2013    Type 2 diabetes mellitus without complication, without long-term current use of insulin (H) 2/16/2017     Past Surgical History:   Procedure Laterality Date    BUNIONECTOMY      Right foot    CATARACT IOL, RT/LT Bilateral 2017    COLONOSCOPY  7/12/2013    Procedure: COLONOSCOPY;  Colonoscopy;  Surgeon: Giovany Espinoza MD;  Location: [facility]    COLONOSCOPY N/A 1/31/2019    Procedure: Combined Colonoscopy, Single Or Multiple Biopsy/Polypectomy By Biopsy;  Surgeon: Efren Osorio MD;  Location: [facility]    COLONOSCOPY WITH CO2 INSUFFLATION N/A 1/31/2019    Procedure: COLONOSCOPY WITH CO2 INSUFFLATION;  Surgeon: fEren Osorio MD;  Location: [facility]    FRACTURE TX, ANKLE RT/LT      Left ankle    FUSION, SPINE, LUMBAR, 3 OR MORE LEVELS, POSTERIOR APPROACH, ROBOTIC-ASSISTED N/A  10/25/2023    Procedure: Thoracic 11 to pelvis robotic decompression and fusion;  Surgeon: Mumtaz Waite MD;  Location: [facility]    HC CYSTOURETHROSCOPY W/ URETEROSCOPY &/OR PYELOSCOPY; W/ LITHOTRIPSY      HC REVISE MEDIAN N/CARPAL TUNNEL SURG      Right wrist    INJECT EPIDURAL LUMBAR Right 12/27/2018    Procedure: INJECT EPIDURAL LUMBAR right foraminal narrowing severe at L4-L5 and moderate at L5-S1;  Surgeon: Gilbert Mcclure MD;  Location: [facility]    INJECT EPIDURAL TRANSFORAMINAL Bilateral 5/27/2021    Procedure: Bilateral Lumbar 5-sacral 1 Epidural steroid injection;  Surgeon: Gilbert Mcclure MD;  Location: [facility]    INJECT EPIDURAL TRANSFORAMINAL Right 6/17/2022    Procedure: Attempted right Lumbar 4-5 and Lumbar 5-Sacral 1 epidural injections with completion of only the right Lumbar 5-Sacral1 Transforaminal Epidural Steroid Injection with fluoroscopic guidance and contrast;  Surgeon: Gilbert Mcclure MD;  Location: [facility]    INJECT EPIDURAL TRANSFORAMINAL Right 7/13/2023    Procedure: Lumbar 3-4 interlaminar epidural steroid injection using fluoroscopic guidance with contrast dye, Right;  Surgeon: Gilbert Mcclure MD;  [facility]: [facility]    INJECT JOINT SACROILIAC  4/10/2014    Procedure: INJECT JOINT SACROILIAC;  Sacroiliac Joint Injection;  Surgeon: Gilbert Mcclure MD;  [facility]: [facility]    INJECT JOINT SACROILIAC Left 4/11/2019    Procedure: INJECT JOINT SACROILIAC- LEFT;  Surgeon: Gilbert Mcclure MD;  [facility]: [facility]    INJECT JOINT SACROILIAC Right 3/3/2023    Procedure: Fluoroscopically-guided injection into the Right Sacroiliac joint;  Surgeon: Gilbert Mcclure MD;  Location: [facility]    IR CVC TUNNEL PLACEMENT > 5 YRS OF AGE  10/30/2023    IR CVC TUNNEL REMOVAL RIGHT  11/24/2023    LAPAROSCOPIC CHOLECYSTECTOMY N/A 3/24/2018    Procedure: LAPAROSCOPIC CHOLECYSTECTOMY;  Laparoscopic Cholecystectomy;  Surgeon: Berry Allen DO;  Location: [facility]     Family History   Problem Relation Age of Onset     Hypertension Brother     Cerebrovascular Disease Brother     Diabetes Father     Cardiovascular Father     Diabetes Brother         Borderline    Breast Cancer Mother     Diabetes Brother     Blood Disease Brother     Cardiovascular Brother         MI         Objective  /80   Temp 99.5  F (37.5  C)   Wt 94.3 kg (208 lb)   LMP  (LMP Unknown)   SpO2 96%   BMI 38.04 kg/m      General: healthy, alert and in no acute distress.    HEENT: no scleral icterus or conjunctival erythema.   Skin: no suspicious lesions or rash. No jaundice.   CV: regular rhythm by palpation, 2+ distal pulses.  Resp: normal respiratory effort without conversational dyspnea.   Psych: normal mood and affect.    Gait: antalgic, appropriate coordination and balance.    Neuro:        - Sensation to light touch:    - Intact throughout the BLE including all peripheral nerve distributions.        - MSR:      RLE  LLE  - Patella 2+ 2+  - Achilles 2+ 2+       - Special tests:   - Slump/SLR:  Neg    MSK - Knee:       - Inspection:    - No significant effusion, erythema, warmth, ecchymosis, lesion.        - ROM:    - Full AROM/PROM with pain during knee flexion/extension.        - Palpation:    - TTP at the medial joint line.  - NTTP elsewhere.        - Strength:  (*antalgic)  - Hip Flexion  5     - Hip Abduction 5    - Hip Adduction 5   - Knee Flexion  5   - Knee Extension 5   - Dorsiflexion  5   - Plantarflexion 5   - Ext. Farhan. Longus 5   - Inversion  5   - Eversion  5        - Special tests:        - Lachman:  Neg         - A/P drawer:  Neg        - Pivot shift:  Neg    - Tr:  Neg      - Varus stress:  Neg for laxity or pain     - Valgus stress:  Neg for laxity or pain    - Patellar grind: Positive   - Thessaly:  Neg     Radiology  I independently reviewed the available relevant imaging in the chart with my interpretations as above in HPI.     I independently reviewed today's new relevant imaging, with the following interpretation:  -XR R  knee 6/4/2024 shows moderate joint space narrowing of the patellofemoral and medial compartments, very small effusion present.  No acute fracture or dislocation.      Assessment  1. Primary osteoarthritis of right knee        Plan  Mya Hui is a pleasant 84 year old female that presents with acute on chronic right knee pain.  She does have a significant history of lumbar spondylosis s/p lumbar fusion and SI joint pain.  Her right knee pain feels distinct from any radicular symptoms in the leg.  Pain deep within the joint with most weightbearing activities, stairs, squatting.  Radiographs reveal moderate joint space narrowing in the patellofemoral and medial compartments which correlate well with her pain.. History and physical exam appear most consistent with primary osteoarthritis of the right knee.     We discussed the nature of the condition and available treatment options, and mutually agreed upon the following plan:    - Imaging:          - Reviewed and independently interpreted the relevant imaging in the chart, including any imaging ordered for today's clinic.  - Reviewed results and images with patient.   - Medications:          - Discussed pharmacologic options for pain relief.   - May use NSAIDs (Ibuprofen, Naproxen) or Acetaminophen (Tylenol) as needed for pain control.   - Do not take these if previously advised to avoid them for other medical conditions.  - May also use topical medications such as lidocaine, IcyHot, BioFreeze, or Voltaren gel as needed for pain control.    - Voltaren gel is an anti-inflammatory cream that may be used up to 4 times per day over the painful area.   - Injections:          - Discussed possible injection options and alternatives.    - Injection options include: Intra-articular knee joint injection with corticosteroid, viscosupplementation, or PRP.    - Deferred injections today and will consider them in the future as needed.   - Therapy:          - Discussed the  benefits of therapy vs home exercise program for optimization of range of motion, flexibility, strength, stability and function.   - Preference is for a home exercise program.   - Home Exercise Program given today in clinic and recommendation given to perform HEP daily and after exacerbations.  - Modalities:          - May use ice, heat, massage or other modalities as needed.   - Bracing:          - Discussed bracing options and recommend using an over-the-counter knee stability brace or compression sleeve for stability and comfort as needed.    - Surgery:          - Discussed non-operative and operative treatment options for the patient's condition.   - Goal is to continue conservative care for as long as possible before surgical intervention would need to be considered.  - Activity:          - Encouraged to remain active and participate in regular activities as symptoms allow.   Avoid or modify exacerbating activities as needed.  - Follow up:          - As needed for re-evaluation and update to treatment plan.  - May follow up sooner for new/worsening symptoms.  - May contact clinic by phone or MyChart for questions or concerns.       Fareed Sorenson DO, CAGAETANOM  [facility]  [facility] - Department of Orthopedic Surgery       Disclaimer:  This note was prepared and written using Dragon Medical dictation software. As a result, there may be errors in the script that have gone undetected. Please consider this when interpreting the information in this note.

## 2024-06-03 DIAGNOSIS — E78.5 HYPERLIPIDEMIA WITH TARGET LDL LESS THAN 100: ICD-10-CM

## 2024-06-03 NOTE — TELEPHONE ENCOUNTER
cholestyramine (QUESTRAN) 4 g packet (Discontinued)     - request from pharmacy is for a discontinued med on patient's list, please advise

## 2024-06-03 NOTE — TELEPHONE ENCOUNTER
My notes say we stopped this medication.     Please confirm with patient    Carlos Torres-SAILAJA Orr  [facility]

## 2024-06-04 ENCOUNTER — ANCILLARY PROCEDURE (OUTPATIENT)
Dept: GENERAL RADIOLOGY | Facility: OTHER | Age: 84
End: 2024-06-04
Attending: STUDENT IN AN ORGANIZED HEALTH CARE EDUCATION/TRAINING PROGRAM
Payer: COMMERCIAL

## 2024-06-04 ENCOUNTER — OFFICE VISIT (OUTPATIENT)
Dept: ORTHOPEDICS | Facility: OTHER | Age: 84
End: 2024-06-04
Attending: PHYSICIAN ASSISTANT
Payer: COMMERCIAL

## 2024-06-04 VITALS
OXYGEN SATURATION: 96 % | BODY MASS INDEX: 38.04 KG/M2 | SYSTOLIC BLOOD PRESSURE: 128 MMHG | DIASTOLIC BLOOD PRESSURE: 80 MMHG | WEIGHT: 208 LBS | TEMPERATURE: 99.5 F

## 2024-06-04 DIAGNOSIS — M25.561 CHRONIC PAIN OF RIGHT KNEE: ICD-10-CM

## 2024-06-04 DIAGNOSIS — M17.11 PRIMARY OSTEOARTHRITIS OF RIGHT KNEE: Primary | ICD-10-CM

## 2024-06-04 DIAGNOSIS — G89.29 CHRONIC PAIN OF RIGHT KNEE: ICD-10-CM

## 2024-06-04 PROCEDURE — 99204 OFFICE O/P NEW MOD 45 MIN: CPT | Performed by: STUDENT IN AN ORGANIZED HEALTH CARE EDUCATION/TRAINING PROGRAM

## 2024-06-04 PROCEDURE — 73562 X-RAY EXAM OF KNEE 3: CPT | Mod: TC | Performed by: RADIOLOGY

## 2024-06-04 RX ORDER — CHOLESTYRAMINE 4 G/9G
1 POWDER, FOR SUSPENSION ORAL 2 TIMES DAILY WITH MEALS
Qty: 180 PACKET | Refills: 3 | Status: SHIPPED | OUTPATIENT
Start: 2024-06-04

## 2024-06-04 ASSESSMENT — PAIN SCALES - GENERAL: PAINLEVEL: MODERATE PAIN (5)

## 2024-06-04 NOTE — LETTER
2024      Mya Hui  [address]  [facility] [address]      Dear Colleague,    Thank you for referring your patient, Mya Hui, to the [facility] [place]. Please see a copy of my visit note below.    Mya Hui  :  1940  DOS: 2024  MRN: [id number]  PCP: Kisha Stinson    Sports Medicine Clinic Visit      HPI  Mya Hui is a 84 year old female who is seen in consultation at the request of  Kisha Stinson PA-C presenting with right knee pain.    - Mechanism of Injury:    - No inciting injury  - Pertinent history and prior evaluations:    - Hx of chronic low back pain and has gotten multiple epidural and SI joint injections, with a recent compression fracture treated with a T11 to pelvis decompression and fusion with Dr. Waite on 10/25/2023.  Using a back brace and on chronic oxycodone for the low back.    - Recently seen in the emergency department on 2024 for right-sided sacroiliac joint pain possibly related to wearing the TLSO brace, which she wears at all times when out of bed.  Followed up with neurosurgery on 2024.  She was given oral steroids which were helpful.  Denied radiculopathy pain or incision concerns at that time.  She has been using an Orthofix bone stimulator since February.  Home care physical therapy at her assisted living facility.  Neurosurgery gave the okay to gradually wean out of the TLSO brace as tolerated, continue to use the bone stimulator, and follow-up in 6 months.  Did not feel the knee was involved with the back.    - XR R Knee 2023 shows mild degenerative changes of the patellofemoral compartment, small knee joint effusion, no acute fractures or dislocations.  Osteopenia present.    - Chronic right knee pain with workup in . Notes from 9/15/2022 and 2022 with Dr. Choe. Belief was that her knee pain was coming from her back at that time.      - Pain Character:    - Location:  medial right knee pain without radiation  - Character:  aching and some sharpness  - Duration:  5 months  - Course:  worsening  - Endorses:    - knee pain has increased as she has weaned out of her lumbar back brace post back surgery, pain, weakness  - Denies:    - numbness, tingling  - Alleviating factors:    - ice pack, sitting in her recliner  - Aggravating factors:    - getting in and out of a car, knee flexion, sit to stand transition  - Other treatments tried:    - ice pack, heat pack, oxycodone at night    - Patient Goals:    - discuss treatment options  - Social History:   - Retired.       Review of Systems  Musculoskeletal: as above  Remainder of review of systems is negative including constitutional, CV, pulmonary, GI, Skin and Neurologic except as noted in HPI or medical history.    Past Medical History:   Diagnosis Date     Acute cholecystitis 3/23/2018     Acute kidney injury (H24) 9/10/2017     Arthritis      Dehydration 9/4/2017     Diabetes type 2, controlled (H)      Elevated lactic acid level 9/10/2017     GERD (gastroesophageal reflux disease)      History of renal calculi      HTN, goal below 140/90      Hyperlipidaemia LDL goal < 100      Hypokalemia 9/3/2017     Hypothyroid      Insomnia      Left carotid stenosis      Migraine      Motion sickness      PONV (postoperative nausea and vomiting)      Sciatica of right side 6/28/2013     Type 2 diabetes mellitus without complication, without long-term current use of insulin (H) 2/16/2017     Past Surgical History:   Procedure Laterality Date     BUNIONECTOMY      Right foot     CATARACT IOL, RT/LT Bilateral 2017     COLONOSCOPY  7/12/2013    Procedure: COLONOSCOPY;  Colonoscopy;  Surgeon: Giovany Espinoza MD;  Location:  GI     COLONOSCOPY N/A 1/31/2019    Procedure: Combined Colonoscopy, Single Or Multiple Biopsy/Polypectomy By Biopsy;  Surgeon: Efren Osorio MD;  Location:  OR      COLONOSCOPY WITH CO2 INSUFFLATION N/A 1/31/2019    Procedure: COLONOSCOPY WITH CO2 INSUFFLATION;  Surgeon: Efren Osorio MD;  Location: [facility] OR     FRACTURE TX, ANKLE RT/LT      Left ankle     FUSION, SPINE, LUMBAR, 3 OR MORE LEVELS, POSTERIOR APPROACH, ROBOTIC-ASSISTED N/A 10/25/2023    Procedure: Thoracic 11 to pelvis robotic decompression and fusion;  Surgeon: Mumtaz Waite MD;  Location: [facility] OR     HC CYSTOURETHROSCOPY W/ URETEROSCOPY &/OR PYELOSCOPY; W/ LITHOTRIPSY       HC REVISE MEDIAN N/CARPAL TUNNEL SURG      Right wrist     INJECT EPIDURAL LUMBAR Right 12/27/2018    Procedure: INJECT EPIDURAL LUMBAR right foraminal narrowing severe at L4-L5 and moderate at L5-S1;  Surgeon: Gilbert Mcclure MD;  Location: [facility] OR     INJECT EPIDURAL TRANSFORAMINAL Bilateral 5/27/2021    Procedure: Bilateral Lumbar 5-sacral 1 Epidural steroid injection;  Surgeon: Gilbert Mcclure MD;  Location: [facility] OR     INJECT EPIDURAL TRANSFORAMINAL Right 6/17/2022    Procedure: Attempted right Lumbar 4-5 and Lumbar 5-Sacral 1 epidural injections with completion of only the right Lumbar 5-Sacral1 Transforaminal Epidural Steroid Injection with fluoroscopic guidance and contrast;  Surgeon: Gilbert Mcclure MD;  Location: [facility] OR     INJECT EPIDURAL TRANSFORAMINAL Right 7/13/2023    Procedure: Lumbar 3-4 interlaminar epidural steroid injection using fluoroscopic guidance with contrast dye, Right;  Surgeon: Gilbert Mcclure MD;  Location: [facility] OR     INJECT JOINT SACROILIAC  4/10/2014    Procedure: INJECT JOINT SACROILIAC;  Sacroiliac Joint Injection;  Surgeon: Gilbert Mcclure MD;  Location: [facility] OR     INJECT JOINT SACROILIAC Left 4/11/2019    Procedure: INJECT JOINT SACROILIAC- LEFT;  Surgeon: Gilbert Mcclure MD;  Location: [facility]     INJECT JOINT SACROILIAC Right 3/3/2023    Procedure: Fluoroscopically-guided injection into the Right Sacroiliac joint;  Surgeon: Gilbert Mcclure MD;  Location: [facility] OR     IR CVC TUNNEL PLACEMENT > 5 YRS OF  AGE  10/30/2023     IR CVC TUNNEL REMOVAL RIGHT  11/24/2023     LAPAROSCOPIC CHOLECYSTECTOMY N/A 3/24/2018    Procedure: LAPAROSCOPIC CHOLECYSTECTOMY;  Laparoscopic Cholecystectomy;  Surgeon: Berry Allen DO;  Location: [facility] OR     Family History   Problem Relation Age of Onset     Hypertension Brother      Cerebrovascular Disease Brother      Diabetes Father      Cardiovascular Father      Diabetes Brother         Borderline     Breast Cancer Mother      Diabetes Brother      Blood Disease Brother      Cardiovascular Brother         MI         Objective  /80   Temp 99.5  F (37.5  C)   Wt 94.3 kg (208 lb)   LMP  (LMP Unknown)   SpO2 96%   BMI 38.04 kg/m      General: healthy, alert and in no acute distress.    HEENT: no scleral icterus or conjunctival erythema.   Skin: no suspicious lesions or rash. No jaundice.   CV: regular rhythm by palpation, 2+ distal pulses.  Resp: normal respiratory effort without conversational dyspnea.   Psych: normal mood and affect.    Gait: antalgic, appropriate coordination and balance.    Neuro:        - Sensation to light touch:    - Intact throughout the BLE including all peripheral nerve distributions.        - MSR:      RLE  LLE  - Patella 2+ 2+  - Achilles 2+ 2+       - Special tests:   - Slump/SLR:  Neg    MSK - Knee:       - Inspection:    - No significant effusion, erythema, warmth, ecchymosis, lesion.        - ROM:    - Full AROM/PROM with pain during knee flexion/extension.        - Palpation:    - TTP at the medial joint line.  - NTTP elsewhere.        - Strength:  (*antalgic)  - Hip Flexion  5     - Hip Abduction 5    - Hip Adduction 5   - Knee Flexion  5   - Knee Extension 5   - Dorsiflexion  5   - Plantarflexion 5   - Ext. Farhan. Longus 5   - Inversion  5   - Eversion  5        - Special tests:        - Lachman:  Neg         - A/P drawer:  Neg        - Pivot shift:  Neg    - Tr:  Neg      - Varus stress:  Neg for laxity or pain     - Valgus  stress:  Neg for laxity or pain    - Patellar grind: Positive   - Thessaly:  Neg     Radiology  I independently reviewed the available relevant imaging in the chart with my interpretations as above in HPI.     I independently reviewed today's new relevant imaging, with the following interpretation:  -XR R knee 6/4/2024 shows moderate joint space narrowing of the patellofemoral and medial compartments, very small effusion present.  No acute fracture or dislocation.      Assessment  1. Primary osteoarthritis of right knee        Plan  Mya Hui is a pleasant 84 year old female that presents with acute on chronic right knee pain.  She does have a significant history of lumbar spondylosis s/p lumbar fusion and SI joint pain.  Her right knee pain feels distinct from any radicular symptoms in the leg.  Pain deep within the joint with most weightbearing activities, stairs, squatting.  Radiographs reveal moderate joint space narrowing in the patellofemoral and medial compartments which correlate well with her pain.. History and physical exam appear most consistent with primary osteoarthritis of the right knee.     We discussed the nature of the condition and available treatment options, and mutually agreed upon the following plan:    - Imaging:          - Reviewed and independently interpreted the relevant imaging in the chart, including any imaging ordered for today's clinic.  - Reviewed results and images with patient.   - Medications:          - Discussed pharmacologic options for pain relief.   - May use NSAIDs (Ibuprofen, Naproxen) or Acetaminophen (Tylenol) as needed for pain control.   - Do not take these if previously advised to avoid them for other medical conditions.  - May also use topical medications such as lidocaine, IcyHot, BioFreeze, or Voltaren gel as needed for pain control.    - Voltaren gel is an anti-inflammatory cream that may be used up to 4 times per day over the painful area.   - Injections:           - Discussed possible injection options and alternatives.    - Injection options include: Intra-articular knee joint injection with corticosteroid, viscosupplementation, or PRP.    - Deferred injections today and will consider them in the future as needed.   - Therapy:          - Discussed the benefits of therapy vs home exercise program for optimization of range of motion, flexibility, strength, stability and function.   - Preference is for a home exercise program.   - Home Exercise Program given today in clinic and recommendation given to perform HEP daily and after exacerbations.  - Modalities:          - May use ice, heat, massage or other modalities as needed.   - Bracing:          - Discussed bracing options and recommend using an over-the-counter knee stability brace or compression sleeve for stability and comfort as needed.    - Surgery:          - Discussed non-operative and operative treatment options for the patient's condition.   - Goal is to continue conservative care for as long as possible before surgical intervention would need to be considered.  - Activity:          - Encouraged to remain active and participate in regular activities as symptoms allow.   Avoid or modify exacerbating activities as needed.  - Follow up:          - As needed for re-evaluation and update to treatment plan.  - May follow up sooner for new/worsening symptoms.  - May contact clinic by phone or MyChart for questions or concerns.       Fareed Sorenson DO, LOS  [facility] - Sports Medicine  [facility] - Department of Orthopedic Surgery       Disclaimer:  This note was prepared and written using Dragon Medical dictation software. As a result, there may be errors in the script that have gone undetected. Please consider this when interpreting the information in this note.       Again, thank you for allowing me to participate in the care of your patient.        Sincerely,        Fareed  Yas, DO

## 2024-06-04 NOTE — TELEPHONE ENCOUNTER
Called patients daughter she states that the patient has not stopped taking it it should've not been discontinued. She needs it to be put back on her profile and refills also.    Andra Gardner MA

## 2024-06-14 ENCOUNTER — TELEPHONE (OUTPATIENT)
Dept: FAMILY MEDICINE | Facility: OTHER | Age: 84
End: 2024-06-14
Payer: COMMERCIAL

## 2024-06-14 DIAGNOSIS — B37.2 CANDIDAL INTERTRIGO: Primary | ICD-10-CM

## 2024-06-14 NOTE — TELEPHONE ENCOUNTER
INCOMING FORMS    Sender: [facility]    Type of Form, letter or note (What is requested?): order    How was the form received?: Fax    How should forms be returned?:  Fax : [phone]    Form placed in CDL bin for review/signature if appropriate.

## 2024-06-17 ENCOUNTER — MEDICAL CORRESPONDENCE (OUTPATIENT)
Dept: HEALTH INFORMATION MANAGEMENT | Facility: CLINIC | Age: 84
End: 2024-06-17
Payer: COMMERCIAL

## 2024-06-17 RX ORDER — NYSTATIN 100000 [USP'U]/G
POWDER TOPICAL 3 TIMES DAILY PRN
Qty: 60 G | Refills: 5 | Status: SHIPPED | OUTPATIENT
Start: 2024-06-17

## 2024-06-26 NOTE — NURSING NOTE
"Mya Hui's goals for this visit include:   Chief Complaint   Patient presents with     RECHECK     Diarrhea two week follow up       She requests these members of her care team be copied on today's visit information: yes    PCP: Chanell Nelson    Referring Provider:  No referring provider defined for this encounter.    BP (!) 167/95   Pulse 67   Ht 1.651 m (5' 5\")   Wt 88.8 kg (195 lb 12.8 oz)   SpO2 98%   BMI 32.58 kg/m      Do you need any medication refills at today's visit? No    Jose Roberts CMA      " Laryngospasm

## 2024-07-16 NOTE — PROGRESS NOTES
Mya Hui  :  1940  DOS: 2024  MRN: [id number]    Sports Medicine Clinic Procedure    Ultrasound Guided Right Intra-Articular Knee Injection, +/- Aspiration    Clinical History: Primary osteoarthritis of right knee    Diagnosis:   1. Primary osteoarthritis of right knee      Large Joint Injection/Arthocentesis: R knee joint    Date/Time: 2024 3:01 PM    Performed by: Fareed Sorenson DO  Authorized by: Fareed Sorenson DO    Indications:  Osteoarthritis  Needle Size:  22 G  Guidance: ultrasound    Approach:  Anterolateral  Location:  Knee      Medications:  40 mg triamcinolone 40 MG/ML; 2 mL lidocaine 1 %; 2 mL BUPivacaine 0.5 %  Outcome:  Tolerated well, no immediate complications  Procedure discussed: discussed risks, benefits, and alternatives    Consent Given by:  Patient  Prep: patient was prepped and draped in usual sterile fashion     Ultrasound images of procedure were permanently stored.     Ultrasound Guided Intra-articular Knee Injection  The knee was prepped and draped in a sterile manner.  Ultrasound identification of the patella, suprapatellar pouch, quadriceps tendon and femur in both long and short axis was obtained. The probe was placed in short axis to the femur. A 1.5 inch 22 gauge needle was placed under ultrasound guidance into the superior knee joint using a lateral approach.  A mixture of 2 mL of 1% lidocaine, 2 mL of 0.5% bupivacaine and 1 ml kenalog (40mg/ml) was injected without difficulty. The needle was removed and there was good hemostasis without complications.  Patient tolerated procedure well.  There was ultrasound documentation of needle placement and injection.      Impression:  Successful ultrasound-guided intra-articular right knee joint corticosteroid injection.    Plan:  - Injection:    - Expectations and goals of the injection were discussed and verbal and written consent was obtained.  - Performed the above procedure today in clinic. Patient tolerated  the procedure well without complications.    - Post-procedure instructions:    - Keep the injection site clean and dry.   - Do not submerge the injection site for 24 hours (no baths, pools). Showers are ok.   - Rest the area for 24-48 hours before resuming normal activities. Avoid overexerting the area for the first few weeks.   - It may take 2-3 days to start noticing the effects of the injection and up to 3-4 weeks to feel significant benefits.   - Follow up:          -In 3+ months as needed for reevaluation and for updates to treatment plan, or sooner for new/worsening symptoms.  - Patient has clinic contact information for questions or concerns.      Fareed Sorenson DO, CAQSM  [facility] - Sports Medicine  [facility] - Department of Orthopedic Surgery     Disclaimer:  This note was prepared and written using Dragon Medical dictation software. As a result, there may be errors in the script that have gone undetected. Please consider this when interpreting the information in this note.

## 2024-07-17 ENCOUNTER — OFFICE VISIT (OUTPATIENT)
Dept: ORTHOPEDICS | Facility: CLINIC | Age: 84
End: 2024-07-17
Payer: COMMERCIAL

## 2024-07-17 DIAGNOSIS — M17.11 PRIMARY OSTEOARTHRITIS OF RIGHT KNEE: Primary | ICD-10-CM

## 2024-07-17 PROCEDURE — 20611 DRAIN/INJ JOINT/BURSA W/US: CPT | Mod: RT | Performed by: STUDENT IN AN ORGANIZED HEALTH CARE EDUCATION/TRAINING PROGRAM

## 2024-07-17 RX ADMIN — BUPIVACAINE HYDROCHLORIDE 2 ML: 5 INJECTION, SOLUTION PERINEURAL at 15:01

## 2024-07-17 RX ADMIN — LIDOCAINE HYDROCHLORIDE 2 ML: 10 INJECTION, SOLUTION INFILTRATION; PERINEURAL at 15:01

## 2024-07-17 RX ADMIN — TRIAMCINOLONE ACETONIDE 40 MG: 40 INJECTION, SUSPENSION INTRA-ARTICULAR; INTRAMUSCULAR at 15:01

## 2024-07-25 RX ORDER — LIDOCAINE HYDROCHLORIDE 10 MG/ML
2 INJECTION, SOLUTION INFILTRATION; PERINEURAL
Status: SHIPPED | OUTPATIENT
Start: 2024-07-17

## 2024-07-25 RX ORDER — TRIAMCINOLONE ACETONIDE 40 MG/ML
40 INJECTION, SUSPENSION INTRA-ARTICULAR; INTRAMUSCULAR
Status: SHIPPED | OUTPATIENT
Start: 2024-07-17

## 2024-07-25 RX ORDER — BUPIVACAINE HYDROCHLORIDE 5 MG/ML
2 INJECTION, SOLUTION PERINEURAL
Status: SHIPPED | OUTPATIENT
Start: 2024-07-17

## 2024-08-19 ENCOUNTER — TELEPHONE (OUTPATIENT)
Dept: FAMILY MEDICINE | Facility: OTHER | Age: 84
End: 2024-08-19
Payer: COMMERCIAL

## 2024-08-19 DIAGNOSIS — M51.369 DDD (DEGENERATIVE DISC DISEASE), LUMBAR: ICD-10-CM

## 2024-08-19 DIAGNOSIS — Z98.1 S/P SPINAL FUSION: ICD-10-CM

## 2024-08-19 RX ORDER — OXYCODONE HYDROCHLORIDE 5 MG/1
5 TABLET ORAL EVERY 6 HOURS PRN
Qty: 90 TABLET | Refills: 0 | Status: SHIPPED | OUTPATIENT
Start: 2024-08-19

## 2024-08-19 NOTE — TELEPHONE ENCOUNTER
reviewed. Due for routine fill. E-prescribed.     Carlos Stinson PA-C  [facility]

## 2024-08-19 NOTE — TELEPHONE ENCOUNTER
Patient's daughter asking if [facility] can be faxed over to [employer] in [facility].  Patient is hoping to see podiatry.  H & P faxed to [phone].

## 2024-08-30 ENCOUNTER — TELEPHONE (OUTPATIENT)
Dept: FAMILY MEDICINE | Facility: OTHER | Age: 84
End: 2024-08-30
Payer: COMMERCIAL

## 2024-08-30 NOTE — TELEPHONE ENCOUNTER
Patient Quality Outreach    Patient is due for the following:   Physical Annual Wellness Visit    Next Steps:   Schedule a Annual Wellness Visit    Type of outreach:    Sent letter.      Questions for provider review:    None           Sarah Larson, [facility]

## 2024-10-10 ENCOUNTER — VIRTUAL VISIT (OUTPATIENT)
Dept: FAMILY MEDICINE | Facility: CLINIC | Age: 84
End: 2024-10-10
Payer: COMMERCIAL

## 2024-10-10 DIAGNOSIS — L30.4 INTERTRIGO: Primary | ICD-10-CM

## 2024-10-10 DIAGNOSIS — R21 RASH: ICD-10-CM

## 2024-10-10 PROCEDURE — 99441 PR PHYSICIAN TELEPHONE EVALUATION 5-10 MIN: CPT | Mod: 93 | Performed by: PHYSICIAN ASSISTANT

## 2024-10-10 RX ORDER — ZINC OXIDE
OINTMENT (GRAM) TOPICAL PRN
Qty: 113 G | Refills: 0 | Status: SHIPPED | OUTPATIENT
Start: 2024-10-10

## 2024-10-10 RX ORDER — CLOTRIMAZOLE 1 %
CREAM (GRAM) TOPICAL 2 TIMES DAILY
Qty: 113 G | Refills: 0 | Status: SHIPPED | OUTPATIENT
Start: 2024-10-10 | End: 2024-10-24

## 2024-10-10 NOTE — PATIENT INSTRUCTIONS
Your rash is concerning for a fungal skin infection called intertrigo from moisture collecting in this area.  For treatment you have been prescribed a topical antifungal-clotrimazole as well as zinc ointment which will help keep moisture from the area and will help the skin heal.  Your symptoms should improve over the next few weeks.  If your symptoms are not improving, or if they are worsening, please follow-up in person for further evaluation/treatment.

## 2024-10-10 NOTE — PROGRESS NOTES
Mya is a 84 year old who is being evaluated via a billable telephone visit.    What phone number would you like to be contacted at? [phone]  How would you like to obtain your AVS? Mail a copy  Originating Location (pt. Location): Home    Distant Location (provider location):  On-site    Assessment & Plan     Rash  Intertrigo  Patient is an 84-year-old female who presents to virtual visit due to rash at inferior aspect of right breast.  Patient was seen by provider at nursing home facility, but cannot follow-up with them based on her insurance.  She was initially prescribed nystatin powder.  Rash has not improved.  Patient is unable to come to clinic for in person evaluation.  Based on history, rash is likely intertrigo.  Patient denies any drainage to suggest underlying abscess and no systemic symptoms to suggest cellulitis. Topical Clotrimazole and Zinc Oxide past prescribed. Recommended follow up if symptoms are not improving over the next 2 weeks or sooner if symptoms are worsening.   - clotrimazole (LOTRIMIN) 1 % external cream; Apply topically 2 times daily for 14 days.  - zinc oxide (DESITIN) 40 % external ointment; Apply topically as needed for irritation or skin protection.      See Patient Instructions    Subjective   Mya is a 84 year old, presenting for the following health issues:  Derm Problem    Patient stated she has no way to submit photo's of this rash and no way to make it into the clinic.         10/10/2024     9:01 AM   Additional Questions   Roomed by Gisselle GRULLON MA   Accompanied by No one         10/10/2024     9:01 AM   Patient Reported Additional Medications   Patient reports taking the following new medications None     HPI   Rash  Onset/Duration: 2 months on and off under right breast.  Patient is unsure of moisture collection in the area, but notes she does sleep on right side at night.  Patient lives at nursing home and was seen by provider there who prescribed a topical powder.    Description  Location: Chest  Character: round, blotchy, raised, painful, burning, red, blister  Itching: severe  Intensity:  severe  Progression of Symptoms:  worsening and intermittent  Accompanying signs and symptoms:   Fever: No  Body aches or joint pain: No  Sore throat symptoms: No  Recent cold symptoms: No  History:           Previous episodes of similar rash: Yes over the past 2 months  New exposures:  None  Recent travel: No  Exposure to similar rash: No  Precipitating or alleviating factors: Nothing  Therapies tried and outcome: Nyamyc Powder with no relief.           Objective       Vitals:  No vitals were obtained today due to virtual visit.    Physical Exam   General: Alert and no distress //Respiratory: No audible wheeze, cough, or shortness of breath // Psychiatric:  Appropriate affect, tone, and pace of words          Phone call duration: 8 minutes  Signed Electronically by: Maya Pelletier PA-C

## 2024-10-15 ENCOUNTER — TELEPHONE (OUTPATIENT)
Dept: FAMILY MEDICINE | Facility: OTHER | Age: 84
End: 2024-10-15
Payer: COMMERCIAL

## 2024-10-24 ENCOUNTER — ANCILLARY PROCEDURE (OUTPATIENT)
Dept: GENERAL RADIOLOGY | Facility: CLINIC | Age: 84
End: 2024-10-24
Attending: NURSE PRACTITIONER
Payer: COMMERCIAL

## 2024-10-24 ENCOUNTER — OFFICE VISIT (OUTPATIENT)
Dept: NEUROSURGERY | Facility: CLINIC | Age: 84
End: 2024-10-24
Payer: COMMERCIAL

## 2024-10-24 VITALS
TEMPERATURE: 98.2 F | WEIGHT: 218 LBS | DIASTOLIC BLOOD PRESSURE: 76 MMHG | BODY MASS INDEX: 39.87 KG/M2 | SYSTOLIC BLOOD PRESSURE: 140 MMHG

## 2024-10-24 DIAGNOSIS — Z98.1 S/P LUMBAR FUSION: Primary | ICD-10-CM

## 2024-10-24 DIAGNOSIS — Z98.1 S/P LUMBAR FUSION: ICD-10-CM

## 2024-10-24 PROCEDURE — 72080 X-RAY EXAM THORACOLMB 2/> VW: CPT | Mod: TC | Performed by: RADIOLOGY

## 2024-10-24 PROCEDURE — 99213 OFFICE O/P EST LOW 20 MIN: CPT | Performed by: NURSE PRACTITIONER

## 2024-10-24 ASSESSMENT — PAIN SCALES - GENERAL: PAINLEVEL_OUTOF10: NO PAIN (0)

## 2024-10-24 NOTE — PROGRESS NOTES
[facility] Neurosurgery  Neurosurgery Follow Up:    HPI: 1 year s/p T11-pelvis robotic decompression and fusion with Dr. Waite on 10/25/2023. Doing well. Minimal back pain. Tends to only be painful with movement and changing positions in bed. No radicular leg pain, paresthesias or overt weakness. Using walker for ambulation. Continues to use bone stimulator. No recent falls or injuries.     Medical, surgical, family, and social history unchanged since prior exam.  Exam:  Constitutional:  Alert, well nourished, NAD.  HEENT: Normocephalic, atraumatic.   Pulm:  Without shortness of breath   CV:  No pitting edema of BLE.      Vital Signs:  BP (!) 140/76 (BP Location: Right arm, Cuff Size: Adult Regular)   Temp 98.2  F (36.8  C) (Temporal)   Wt 218 lb (98.9 kg)   LMP  (LMP Unknown)   BMI 39.87 kg/m      Neurological:  Awake  Alert  Oriented x 3  Motor exam:        IP Q DF PF EHL  R   5  5   5   5    5  L   5  5   5   5    5     Able to spontaneously move L/E bilaterally  Sensation intact throughout all L/E dermatomes     Incisions:  Healed nicely  Imaging: AP and lateral films reveal intact and stable  hardware.    A/P: s/p lumbar spine fusion  May continue to increase as tolerated. Follow up as needed. She verbalized understanding and agreement.   Patient Instructions   -Continue to increase activities as tolerated.  -Follow up as needed.  -Please contact our clinic with questions or concerns at [phone].    Rhina Gregory CNP  [facility] Neurosurgery  [address] [place] [id number]  Tel [phone]  Fax [phone]

## 2024-10-24 NOTE — PROGRESS NOTES
"Mya Hui is a 84 year old female who presents for:  Chief Complaint   Patient presents with    Neurologic Problem        Initial Vitals:  BP (!) 140/76 (BP Location: Right arm, Cuff Size: Adult Regular)   Temp 98.2  F (36.8  C) (Temporal)   Wt 218 lb (98.9 kg)   LMP  (LMP Unknown)   BMI 39.87 kg/m   Estimated body mass index is 39.87 kg/m  as calculated from the following:    Height as of 2/13/24: 5' 2\" (1.575 m).    Weight as of this encounter: 218 lb (98.9 kg).. Body surface area is 2.08 meters squared. BP completed using cuff size: regular  No Pain (0)    Nursing Comments:     Suki Hurt CMA    "

## 2024-10-24 NOTE — LETTER
"10/24/2024      Mya Hui  [address]  [facility] [address]      Dear Colleague,    Thank you for referring your patient, Mya Hui, to the [facility]UROS[facility] [place]. Please see a copy of my visit note below.    Mya Hui is a 84 year old female who presents for:  Chief Complaint   Patient presents with     Neurologic Problem        Initial Vitals:  BP (!) 140/76 (BP Location: Right arm, Cuff Size: Adult Regular)   Temp 98.2  F (36.8  C) (Temporal)   Wt 218 lb (98.9 kg)   LMP  (LMP Unknown)   BMI 39.87 kg/m   Estimated body mass index is 39.87 kg/m  as calculated from the following:    Height as of 2/13/24: 5' 2\" (1.575 m).    Weight as of this encounter: 218 lb (98.9 kg).. Body surface area is 2.08 meters squared. BP completed using cuff size: regular  No Pain (0)    Nursing Comments:     Suki Hurt CMA      [facility] Neurosurgery  Neurosurgery Follow Up:    HPI: 1 year s/p T11-pelvis robotic decompression and fusion with Dr. Waite on 10/25/2023. Doing well. Minimal back pain. Tends to only be painful with movement and changing positions in bed. No radicular leg pain, paresthesias or overt weakness. Using walker for ambulation. Continues to use bone stimulator. No recent falls or injuries.     Medical, surgical, family, and social history unchanged since prior exam.  Exam:  Constitutional:  Alert, well nourished, NAD.  HEENT: Normocephalic, atraumatic.   Pulm:  Without shortness of breath   CV:  No pitting edema of BLE.      Vital Signs:  BP (!) 140/76 (BP Location: Right arm, Cuff Size: Adult Regular)   Temp 98.2  F (36.8  C) (Temporal)   Wt 218 lb (98.9 kg)   LMP  (LMP Unknown)   BMI 39.87 kg/m      Neurological:  Awake  Alert  Oriented x 3  Motor exam:        IP Q DF PF EHL  R   5  5   5   5    5  L   5  5   5   5    5     Able to spontaneously move L/E bilaterally  Sensation intact throughout all L/E dermatomes     Incisions:  " Healed nicely  Imaging: AP and lateral films reveal intact and stable  hardware.    A/P: s/p lumbar spine fusion  May continue to increase as tolerated. Follow up as needed. She verbalized understanding and agreement.   Patient Instructions   -Continue to increase activities as tolerated.  -Follow up as needed.  -Please contact our clinic with questions or concerns at [phone].    Rhina Gregory CNP  [facility] Neurosurgery  [address] [place] [id number]  Tel [phone]  Fax [phone]      Again, thank you for allowing me to participate in the care of your patient.        Sincerely,        Rhina Gregory NP

## 2024-11-07 ENCOUNTER — PATIENT OUTREACH (OUTPATIENT)
Dept: NEPHROLOGY | Facility: CLINIC | Age: 84
End: 2024-11-07
Payer: COMMERCIAL

## 2024-11-07 DIAGNOSIS — N18.32 CKD STAGE 3B, GFR 30-44 ML/MIN (H): Primary | ICD-10-CM

## 2024-11-07 DIAGNOSIS — D64.9 ANEMIA, UNSPECIFIED TYPE: ICD-10-CM

## 2024-11-07 DIAGNOSIS — N25.81 SECONDARY RENAL HYPERPARATHYROIDISM (H): ICD-10-CM

## 2024-11-07 NOTE — PROGRESS NOTES
Received the following message from Dr. Kilpatrick:   Team -     Recognizing that this patient that I saw in March did not get her follow-up in 3 months as recommended.     If she is not being seen elsewhere, I recommend that she do labs again at this time and also arrange a follow-up for next available appointment. I have lab orders in place.       Last visit with Dr. Kilpatrick was 2/27/24. Team to arrange for follow up for CKD 3, Anemia, HTN. Clinic will reach out to discuss with patient.

## 2024-11-08 NOTE — PROGRESS NOTES
"Called and spoke with pt.  Discussed message below regarding pt need labs and return appt with Dr. Kilpatrick.  Pt requested that her Daughter \"April\" be contacted as she schedules and drives pt to her appts.      Called and spoke with pts Daughter \"April\" (Auth to discuss in chart).  Discussed message from Dr. Kilpatrick:   Team -     Recognizing that this patient that I saw in March did not get her follow-up in 3 months as recommended.     If she is not being seen elsewhere, I recommend that she do labs again at this time and also arrange a follow-up for next available appointment. I have lab orders in place.     April verbalized understanding and agreed with plan.  Offered in clinic appt 11/11/24 at 1:30pm.  Appt declined due to pt have another appt that day.  April stated she will schedule the non fasting lab appt in [place] sometime next week.       Offered 12/16/24 at 2:00pm with Dr. Kilpatrick at [facility] with a non fasting lab appt at 1:30pm.  April agreed and appts scheduled.    Vanessa Sosa LPN        "

## 2024-11-11 ENCOUNTER — LAB (OUTPATIENT)
Dept: LAB | Facility: CLINIC | Age: 84
End: 2024-11-11
Payer: COMMERCIAL

## 2024-11-11 DIAGNOSIS — N18.32 CKD STAGE 3B, GFR 30-44 ML/MIN (H): ICD-10-CM

## 2024-11-11 DIAGNOSIS — N25.81 SECONDARY RENAL HYPERPARATHYROIDISM (H): ICD-10-CM

## 2024-11-11 DIAGNOSIS — D64.9 ANEMIA, UNSPECIFIED TYPE: ICD-10-CM

## 2024-11-11 LAB
ALBUMIN SERPL BCG-MCNC: 4.4 G/DL (ref 3.5–5.2)
ANION GAP SERPL CALCULATED.3IONS-SCNC: 14 MMOL/L (ref 7–15)
BUN SERPL-MCNC: 18.4 MG/DL (ref 8–23)
CALCIUM SERPL-MCNC: 9.6 MG/DL (ref 8.8–10.4)
CHLORIDE SERPL-SCNC: 104 MMOL/L (ref 98–107)
CREAT SERPL-MCNC: 1.16 MG/DL (ref 0.51–0.95)
EGFRCR SERPLBLD CKD-EPI 2021: 47 ML/MIN/1.73M2
FERRITIN SERPL-MCNC: 99 NG/ML (ref 11–328)
GLUCOSE SERPL-MCNC: 124 MG/DL (ref 70–99)
HCO3 SERPL-SCNC: 25 MMOL/L (ref 22–29)
HGB BLD-MCNC: 14.1 G/DL (ref 11.7–15.7)
IRON BINDING CAPACITY (ROCHE): 333 UG/DL (ref 240–430)
IRON SATN MFR SERPL: 22 % (ref 15–46)
IRON SERPL-MCNC: 73 UG/DL (ref 37–145)
PHOSPHATE SERPL-MCNC: 3 MG/DL (ref 2.5–4.5)
POTASSIUM SERPL-SCNC: 3.5 MMOL/L (ref 3.4–5.3)
PTH-INTACT SERPL-MCNC: 61 PG/ML (ref 15–65)
SODIUM SERPL-SCNC: 143 MMOL/L (ref 135–145)

## 2024-11-11 PROCEDURE — 36415 COLL VENOUS BLD VENIPUNCTURE: CPT

## 2024-11-11 PROCEDURE — 82728 ASSAY OF FERRITIN: CPT

## 2024-11-11 PROCEDURE — 80069 RENAL FUNCTION PANEL: CPT

## 2024-11-11 PROCEDURE — 83540 ASSAY OF IRON: CPT

## 2024-11-11 PROCEDURE — 85018 HEMOGLOBIN: CPT

## 2024-11-11 PROCEDURE — 83970 ASSAY OF PARATHORMONE: CPT

## 2024-11-11 PROCEDURE — 82306 VITAMIN D 25 HYDROXY: CPT

## 2024-11-11 PROCEDURE — 83550 IRON BINDING TEST: CPT

## 2024-11-12 LAB — VIT D+METAB SERPL-MCNC: 15 NG/ML (ref 20–50)

## 2024-11-23 DIAGNOSIS — Z98.1 S/P SPINAL FUSION: ICD-10-CM

## 2024-11-23 DIAGNOSIS — M51.369 DDD (DEGENERATIVE DISC DISEASE), LUMBAR: ICD-10-CM

## 2024-11-24 RX ORDER — OXYCODONE HYDROCHLORIDE 5 MG/1
5 TABLET ORAL EVERY 6 HOURS PRN
Qty: 90 TABLET | Refills: 0 | Status: SHIPPED | OUTPATIENT
Start: 2024-11-24

## 2024-11-24 NOTE — TELEPHONE ENCOUNTER
reviewed. Due for routine fill. E-prescribed.     Carlos Stinson PA-C  [facility]

## 2024-12-02 ENCOUNTER — TELEPHONE (OUTPATIENT)
Dept: FAMILY MEDICINE | Facility: OTHER | Age: 84
End: 2024-12-02
Payer: COMMERCIAL

## 2024-12-02 DIAGNOSIS — M51.362 DEGENERATION OF INTERVERTEBRAL DISC OF LUMBAR REGION WITH DISCOGENIC BACK PAIN AND LOWER EXTREMITY PAIN: ICD-10-CM

## 2024-12-02 DIAGNOSIS — Z98.1 S/P SPINAL FUSION: ICD-10-CM

## 2024-12-02 NOTE — TELEPHONE ENCOUNTER
Medication Question or Refill    Contacts       Contact Date/Time Type Contact Phone/Fax    12/02/2024 01:38 PM CST Phone (Incoming) Liberty Clements (Emergency Contact) [phone] (M)            What medication are you calling about (include dose and sig)?: OXYCODONE    Preferred Pharmacy:   [id number] - [place], [place] - [address]  [address] [facility] [address]  Phone: [phone] Fax: [phone]      Controlled Substance Agreement on file:   CSA -- Patient Level:    CSA: None found at the patient level.       Who prescribed the medication?: Kisha Stinson, SAILAJA     Do you need a refill? Yes    When did you use the medication last? Week or Two     Patient offered an appointment? No    Do you have any questions or concerns?  Pt is has been experiencing back pain.       Okay to leave a detailed message?: Yes at Other phone number:      Liberty Clements Daughter [phone]

## 2024-12-03 RX ORDER — OXYCODONE HYDROCHLORIDE 5 MG/1
5 TABLET ORAL EVERY 6 HOURS PRN
Qty: 90 TABLET | Refills: 0 | Status: SHIPPED | OUTPATIENT
Start: 2024-12-03

## 2024-12-03 NOTE — TELEPHONE ENCOUNTER
reviewed. Due for routine fill. E-prescribed.     Carlos Stinson PA-C  [facility]

## 2025-01-06 ENCOUNTER — TRANSFERRED RECORDS (OUTPATIENT)
Dept: HEALTH INFORMATION MANAGEMENT | Facility: CLINIC | Age: 85
End: 2025-01-06
Payer: COMMERCIAL

## 2025-01-06 LAB — RETINOPATHY: POSITIVE

## 2025-01-17 PROBLEM — N18.32 STAGE 3B CHRONIC KIDNEY DISEASE (H): Status: ACTIVE | Noted: 2020-01-20

## 2025-01-17 PROBLEM — E66.01 CLASS 2 SEVERE OBESITY DUE TO EXCESS CALORIES WITH SERIOUS COMORBIDITY AND BODY MASS INDEX (BMI) OF 38.0 TO 38.9 IN ADULT (H): Status: ACTIVE | Noted: 2025-01-17

## 2025-01-17 PROBLEM — E66.812 CLASS 2 SEVERE OBESITY DUE TO EXCESS CALORIES WITH SERIOUS COMORBIDITY AND BODY MASS INDEX (BMI) OF 38.0 TO 38.9 IN ADULT (H): Status: ACTIVE | Noted: 2025-01-17

## 2025-01-18 DIAGNOSIS — J10.1 INFLUENZA A: Primary | ICD-10-CM

## 2025-01-18 RX ORDER — OSELTAMIVIR PHOSPHATE 30 MG/1
30 CAPSULE ORAL DAILY
Qty: 5 CAPSULE | Refills: 0 | Status: SHIPPED | OUTPATIENT
Start: 2025-01-18 | End: 2025-01-23

## 2025-01-18 NOTE — PROGRESS NOTES
Called and spoke with daughter this morning, January 18, 2025. Relayed information regarding positive influenza swab. I have sent out tamiflu for the patient take. The dose was adjusted for her renal function. She may continue therapies started at her visit. They will reach out if not improving as expected.    Fareed Roldan PA-C on 1/18/2025 at 9:16 AM

## 2025-01-19 NOTE — PROGRESS NOTES
Mya is a 84 year old who is being evaluated via a billable video visit.      How would you like to obtain your AVS? Mail a copy  If the video visit is dropped, the invitation should be resent by: Text to cell phone: [phone]  Will anyone else be joining your video visit? No      Instructions Relayed to Patient by Virtual Roomer:     Reminded patient to ensure they were logged on to virtual visit by arrival time listed. Documented in appointment notes if patient had flexibility to initiate visit sooner than arrival time. If pediatric virtual visit, ensured pediatric patient along with parent/guardian will be present for video visit.     Patient offered the website [email or web address] and/or phone number to [employer] Help line: [phone]       Assessment & Plan     ICD-10-CM    1. DDD (degenerative disc disease), lumbar  M51.36 oxyCODONE (ROXICODONE) 5 MG tablet      2. S/P spinal fusion  Z98.1 oxyCODONE (ROXICODONE) 5 MG tablet        - Patient reporting some increased pain since starting home physical therapy   - Some days tylenol is not enough   - Patient is s/p 2 back surgeries in the past few months with compression fracture and loosening of hardware   - Recommend oxycodone 5 mg   - Discussed use and side effects including sedation, addiction, and constipation, use sparingly   -  reviewed, no concerns   - OK for refill x1   - OK to continue to use Tylenol PRN       Review of the result(s) of each unique test - None     Diagnosis or treatment significantly limited by social determinants of health - None     23 minutes spent on the date of the encounter doing chart review, history and exam, documentation and further activities as noted above    The patient indicates understanding of these issues and agrees with the plan.    Follow up:     Carlos Torres-SAILAJA Orr  [facility]   Mya is a 84 year old, presenting for the following health  NEPHROLOGY FOLLOW UP NOTE    Nuris Squires   62 y.o.      MRN/Room: [id number]    Subjective: No acute events. Pt feeling well. No SOB. No worsening Le swelling.       Objective:     RHC (17-Jan-25)   RA: 8  PA: 51/15  PCWP: 14  CO/CI: 18.2/9   PA sat 81 %   IVC sat 86%   Right subclavian sat 79  AO 98%         Physical Exam  Vitals reviewed.   Constitutional:       General: She is not in acute distress.  HENT:      Head: Normocephalic and atraumatic.   Eyes:      Extraocular Movements: Extraocular movements intact.      Conjunctiva/sclera: Conjunctivae normal.   Cardiovascular:      Rate and Rhythm: Normal rate and regular rhythm.      Heart sounds: Normal heart sounds.   Pulmonary:      Effort: Pulmonary effort is normal.      Breath sounds: Normal breath sounds.   Abdominal:      General: There is no abdominal bruit.      Palpations: There is no fluid wave.   Musculoskeletal:      Right lower leg: Edema present.      Left lower leg: Edema present.      Comments: Trace LE edema    Skin:     General: Skin is warm and dry.   Neurological:      Mental Status: She is alert and oriented to person, place, and time.       Imaging:     US renal complete    Result Date: 1/15/2025  Borderline increased renal cortical echogenicity bilaterally which may reflect medical renal disease, without evidence of obstructive uropathy.   Signed by: Kapil Bass 1/15/2025 2:27 PM Dictation workstation:   [id number]    NM Lung perfusion with spect    Result Date: 1/9/2025  1. No wedge-shaped segmental or subsegmental perfusion defect in both lungs to suggest acute pulmonary embolism (low probability).   The interpretation above is based on modified PIOPED II and PISAPED criteria.   I personally reviewed the images/study and I agree with the findings as stated by Lily Mane MD.  This study was interpreted at [facility], [place], [place].   MACRO: None   Signed by: Christiano Brunson 1/9/2025  10:13 AM Dictation workstation:   [id number]    XR chest 1 view    Result Date: 1/2/2025  Constellation of findings is suggestive of congestive heart failure. Signed by Rupesh Borges MD    BI US breast limited left    Result Date: 12/26/2024  Interval resolution of the previously described finding. Patient can return to screening.   BI-RADS CATEGORY: BI-RADS Category:  1 Negative. Recommendation:  Annual Screening. Recommended Date:  1 Year. Laterality:  Bilateral.   For any future breast imaging appointments, please call [phone] ([phone]).     MACRO: None   Signed by: Jax Hagan 12/26/2024 11:36 AM Dictation workstation:   [id number]         Meds:   aspirin, 81 mg, Daily  atorvastatin, 40 mg, Nightly  capsaicin, , TID  ferrous sulfate (325 mg ferrous sulfate), 65 mg of iron, Daily with breakfast  folic acid, 1 mg, Daily  gabapentin, 300 mg, BID  heparin (porcine), 7,500 Units, q8h NEHEMIAS  hydrALAZINE, 50 mg, TID  insulin glargine, 18 Units, q24h  insulin lispro, 0-10 Units, TID AC  isosorbide dinitrate, 10 mg, TID  oxybutynin, 5 mg, TID  polyethylene glycol, 17 g, Daily  sennosides-docusate sodium, 2 tablet, BID  [Held by provider] torsemide, 60 mg, BID  vitamin B complex-vitamin C-folic acid, 1 capsule, Daily           acetaminophen, 650 mg, q6h PRN  albuterol, 2.5 mg, q6h PRN  bisacodyl, 10 mg, Daily PRN  dextrose, 12.5 g, q15 min PRN  dextrose, 25 g, q15 min PRN  glucagon, 1 mg, q15 min PRN  glucagon, 1 mg, q15 min PRN  oxygen, , Continuous PRN - O2/gases  oxygen, , Continuous PRN - O2/gases        Vitals:    01/19/25 0900   BP: 116/72   Pulse: 73   Resp: 16   Temp: 36.6 °C (97.9 °F)   SpO2: 95%      Vitals:    01/14/25 0630   Weight: 100 kg (221 lb 1.9 oz)         Intake/Output Summary (Last 24 hours) at 1/19/2025 1201  Last data filed at 1/19/2025 0800  Gross per 24 hour   Intake --   Output 1525 ml   Net -1525 ml     Weight change:     3 Day Weight Change: Unable to Calculate        Blood Labs:  Results  issues:  Back Pain and Follow Up      3/12/2024     1:36 PM   Additional Questions   Roomed by Krystal         3/12/2024     1:36 PM   Patient Reported Additional Medications   Patient reports taking the following new medications none     Back Pain     History of Present Illness       Back Pain:  She presents for follow up of back pain. Patient's back pain is a new problem.    Original cause of back pain: not sure  First noticed back pain: in the last week  Patient feels back pain: comes and goesLocation of back pain:  Right lower back  Description of back pain: sharp and stabbing  Back pain spreads: nowhere    Since patient first noticed back pain, pain is: always present, but gets better and worse  Does back pain interfere with her job:  Not applicable  On a scale of 1-10 (10 being the worst), patient describes pain as:  6  What makes back pain worse: bending, certain positions and twisting   Acupuncture: not tried  Acetaminophen: helpful  Activity or exercise: not helpful  Chiropractor:  Not tried  Cold: not tried  Heat: not tried  Massage: not tried  Muscle relaxants: not tried  NSAIDS: helpful  Opioids: not tried  Physical Therapy: helpful  Rest: helpful  Steroid Injection: not tried  Stretching: not helpful  Surgery: not tried  TENS unit: not tried  Topical pain relievers: not tried  Other healthcare providers patient is seeing for back pain: Physical Therapist    She eats 2-3 servings of fruits and vegetables daily.She consumes 0 sweetened beverage(s) daily.She exercises with enough effort to increase her heart rate 30 to 60 minutes per day.  She exercises with enough effort to increase her heart rate 3 or less days per week.   She is taking medications regularly.     - Some days it is good and sometimes not   - Some pain that isn't controlled by tylenol   - Mostly in and out of bed      First step getting out of chair   - Tylenol takes the edge off   - Pain in same area that was the fracture   - Started  for orders placed or performed during the hospital encounter of 01/02/25 (from the past 24 hours)   POCT GLUCOSE   Result Value Ref Range    POCT Glucose 122 (H) 74 - 99 mg/dL   POCT GLUCOSE   Result Value Ref Range    POCT Glucose 207 (H) 74 - 99 mg/dL   CBC   Result Value Ref Range    WBC 9.1 4.4 - 11.3 x10*3/uL    nRBC 0.0 0.0 - 0.0 /100 WBCs    RBC 3.10 (L) 4.00 - 5.20 x10*6/uL    Hemoglobin 9.3 (L) 12.0 - 16.0 g/dL    Hematocrit 29.7 (L) 36.0 - 46.0 %    MCV 96 80 - 100 fL    MCH 30.0 26.0 - 34.0 pg    MCHC 31.3 (L) 32.0 - 36.0 g/dL    RDW 14.6 (H) 11.5 - 14.5 %    Platelets 372 150 - 450 x10*3/uL   Magnesium   Result Value Ref Range    Magnesium 2.86 (H) 1.60 - 2.40 mg/dL   Renal function panel   Result Value Ref Range    Glucose 130 (H) 74 - 99 mg/dL    Sodium 141 136 - 145 mmol/L    Potassium 4.7 3.5 - 5.3 mmol/L    Chloride 102 98 - 107 mmol/L    Bicarbonate 26 21 - 32 mmol/L    Anion Gap 18 10 - 20 mmol/L    Urea Nitrogen 90 (H) 6 - 23 mg/dL    Creatinine 4.48 (H) 0.50 - 1.05 mg/dL    eGFR 11 (L) >60 mL/min/1.73m*2    Calcium 8.3 (L) 8.6 - 10.6 mg/dL    Phosphorus 6.8 (H) 2.5 - 4.9 mg/dL    Albumin 2.9 (L) 3.4 - 5.0 g/dL   POCT GLUCOSE   Result Value Ref Range    POCT Glucose 150 (H) 74 - 99 mg/dL     *Note: Due to a large number of results and/or encounters for the requested time period, some results have not been displayed. A complete set of results can be found in Results Review.          ASSESSMENT:  Nuris Squires is a  62 y.o.  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 Admitted under HHVI Service for further management of acute ADHF and HTN Urgency.     #Normotensive ischemic injury with CRS   #VENKAT on Proteinuric CKD IV, nonoliguric, Stage II Likely  physical therapy       They come to her   - XR on 3/21/24     - Some swelling even with compression socks   - For the most part working but sometimes not as much         Review of Systems  Constitutional, neuro, ENT, endocrine, pulmonary, cardiac, gastrointestinal, genitourinary, musculoskeletal, integument and psychiatric systems are negative, except as otherwise noted.      Objective           Vitals:  No vitals were obtained today due to virtual visit.    Physical Exam   GENERAL: alert and no distress  EYES: Eyes grossly normal to inspection.  No discharge or erythema, or obvious scleral/conjunctival abnormalities.  RESP: No audible wheeze, cough, or visible cyanosis.    SKIN: Visible skin clear. No significant rash, abnormal pigmentation or lesions.  NEURO: Cranial nerves grossly intact.  Mentation and speech appropriate for age.  PSYCH: Appropriate affect, tone, and pace of words    Diagnostics: none       Video-Visit Details    Type of service:  Video Visit   Video Start Time: 3:26 PM  Video End Time:3:37 PM  Originating Location (pt. Location): Home  Distant Location (provider location):  Off-site  Platform used for Video Visit: Abebe  Signed Electronically by: Kisha Torres-SAILAJA Orr     2/2 Diabetic Kidney disease   #C/f neurogenic bladder 2/2 DMII   - Baseline creatinine: 1.6  - Etiology: Likely Multifactorial (CRS and Normotensive ischemic injury)   - Clinical volume status: euvolemic  - Hemodynamics: BP fluctuates, sBP 110-140    Recommendations:  - BP still fluctating however want to achieve sBP > 120 ideally. Continue to hold Amlodipine and Torsemide today and if BP remains stable tomorrow, will likely resume diuretics   - Holding parameters for hydralazine below 120 systolic   - Indication for dialysis:  No   - Avoid nephrotoxins, contrast if possible  - strict Is/Os to evaluate patient's fluid status   - Renal dosing for medications for latest eGFR, follow medication trough as appropriate      Katia Guerra DO  Nephrology Resident  24 hour Renal Pager - [phone]    Discussed with attending nephrologist

## 2025-01-23 ENCOUNTER — NURSE TRIAGE (OUTPATIENT)
Dept: FAMILY MEDICINE | Facility: OTHER | Age: 85
End: 2025-01-23
Payer: COMMERCIAL

## 2025-01-23 NOTE — TELEPHONE ENCOUNTER
FYI - Status Update    Who is Calling: Daughter April    Update: Medication given on 1/17/2025 is not working    Does caller want a call/response back: Yes     Okay to leave a detailed message?: Yes at Other phone number:  [phone]*

## 2025-01-23 NOTE — TELEPHONE ENCOUNTER
Nurse Triage SBAR    Is this a 2nd Level Triage? NO    Situation: Follow up call from influenza diagnosis.    Background: Daughter calling (consent to communicate on file) reports the medications given at appointment on 1/17/25 have not helped patient with her symptoms. Reports continued cough and daughter reports patient seems short of breath when talking. Patient had reported not sleeping at night due to the cough.    Assessment: RN called patient to triage, patient reports she feels the best today that she has in a long time. Reports she hasn't coughed at all today. Denies shortness of breath, denies difficulty breathing. Denies chest pain. She is speaking in full sentences at this time. Denies fever. Has no new symptoms, no specific concerns at this time.     Protocol Recommended Disposition:   Home Care    Recommendation: Home care advise discussed with daughter and patient per protocol. RN reviewed red flag symptoms with patient and when to see emergency care. They agreed and understood.     Does the patient meet one of the following criteria for ADS visit consideration? 16+ years old, with an [facility] PCP     Raegan Ferreira RN on 1/23/2025 at 10:47 AM      Reason for Disposition   Influenza diagnosed by doctor (or NP/PA) and no complications    Additional Information   Negative: SEVERE difficulty breathing (e.g., struggling for each breath, speaks in single words)   Negative: Bluish (or gray) lips or face   Negative: Shock suspected (e.g., cold/pale/clammy skin, too weak to stand, low BP, rapid pulse)   Negative: Sounds like a life-threatening emergency to the triager   Negative: Asthma attack (coughing, wheezing) is main concern and previously diagnosed with asthma OR using asthma medicines   Negative: Chest pain  (Exception: MILD central chest pain, present only when coughing.)   Negative: Headache and stiff neck (can't touch chin to chest)   Negative: Difficulty breathing that is not severe and not  relieved by cleaning out the nose   Negative: Patient sounds very sick or weak to the triager   Negative: Fever > 104 F (40 C)   Negative: Fever present > 3 days (72 hours)   Negative: Fever returns after gone for over 24 hours and symptoms worse or not improved   Negative: Using nasal washes and pain medicine > 24 hours and sinus pain (around cheekbone or eye) persists   Negative: Earache   Negative: Patient wants to be seen   Negative: Nasal discharge present > 10 days   Negative: Cough present > 3 weeks    Protocols used: Influenza (Flu) Follow-up Call-[place]

## 2025-02-03 NOTE — LETTER
"February 5, 2019      Mya Hui  [address]   [place] [address]        Dear ,    We are writing to inform you of your test results.  There is no sign of microscopic colitis or other abnormality of the colon.  There was a small polyp we removed which is called an adenoma and it was completely removed.  There is no need for additional colon cancer screening/surveillance colonoscopies based on current national recommendations.  Plan to follow-up in the office as scheduled.  Please let me know if you have any questions.    Resulted Orders   Glucose by meter   Result Value Ref Range    Glucose 110 (H) 70 - 99 mg/dL   Surgical pathology exam   Result Value Ref Range    Copath Report       Patient Name: MYA HUI  MR#: [id number]  Specimen #: [id number]  Collected: 1/31/2019  Received: 2/1/2019  Reported: 2/4/2019 13:41  Ordering Phy(s): MARCELO RAGSDALE    For improved result formatting, select 'View Enhanced Report Format' under   Linked Documents section.    SPECIMEN(S):  A: Random right and left colon biopsies  B: Colon polyp, transverse    FINAL DIAGNOSIS:  A. Random right and left colon biopsies:  - Multiple fragments of colorectal mucosa without pathological abnormality  - No evidence of collagenous or lymphocytic (microscopic) colitis    B. Colon polyp, transverse:  - Tubular adenoma  - No evidence of high grade dysplasia or malignancy    I have personally reviewed all specimens and/or slides, including the   listed special stains, and used them  with my medical judgement to determine or confirm the final diagnosis.    Electronically signed out by:    Thomas Santiago M.D., [facility]    CLINICAL HISTORY:  The patient is a 78-year-old female with a  history of chronic severe   diarrhea. Colonoscopy showed a 4 mm polyp  but otherwise normal colon,    GROSS:  A:  The specimen is received in formalin with proper patient   identification, labeled \"random right and " "Patient Education     Routine physical for adults   The Basics   Written by the doctors and editors at [facility]   What is a physical? -- A physical is a routine visit, or \"check-up,\" with your doctor. You might also hear it called a \"wellness visit\" or \"preventive visit.\"  During each visit, the doctor will:   Ask about your physical and mental health   Ask about your habits, behaviors, and lifestyle   Do an exam   Give you vaccines if needed   Talk to you about any medicines you take   Give advice about your health   Answer your questions  Getting regular check-ups is an important part of taking care of your health. It can help your doctor find and treat any problems you have. But it's also important for preventing health problems.  A routine physical is different from a \"sick visit.\" A sick visit is when you see a doctor because of a health concern or problem. Since physicals are scheduled ahead of time, you can think about what you want to ask the doctor.  How often should I get a physical? -- It depends on your age and health. In general, for people age 21 years and older:   If you are younger than 50 years, you might be able to get a physical every 3 years.   If you are 50 years or older, your doctor might recommend a physical every year.  If you have an ongoing health condition, like diabetes or high blood pressure, your doctor will probably want to see you more often.  What happens during a physical? -- In general, each visit will include:   Physical exam - The doctor or nurse will check your height, weight, heart rate, and blood pressure. They will also look at your eyes and ears. They will ask about how you are feeling and whether you have any symptoms that bother you.   Medicines - It's a good idea to bring a list of all the medicines you take to each doctor visit. Your doctor will talk to you about your medicines and answer any questions. Tell them if you are having any side effects that bother you. You " "left  colon biopsy\".  The specimen consists of multiple irregular tan-white   pieces of soft tissue ranging from  0.2-0.4 cm in greatest dimension which are entirely submitted in cassette   A1.    B:  The specimen is received in formalin with proper patient   identification, labeled \"transverse colon polyp  x1\".  The specimen consists of a 0.4 cm in greatest dimension, polypoid   tan soft tissue which is entirely  submitted in cassette B1. (Dictated by: Emy Pinto 2/1/2019 11:23 AM)    MICROSCOPIC:  Microscopic examination is performed.    The technical component of this testing was completed at the [facility], with the professional component performed   at the [address] [address] ([phone])    CPT Codes:  A: 12182-NN0  B: 02641-MO1    COLLECTION SITE:  Client: [facility]  Location: [facility] (B)    Resident  [id number]         If you have any questions or concerns, please call the clinic at the number listed above.       Sincerely,        No name on file.                " "should also tell them if you are having trouble paying for any of your medicines.   Habits and behaviors - This includes:   Your diet   Your exercise habits   Whether you smoke, drink alcohol, or use drugs   Whether you are sexually active   Whether you feel safe at home  Your doctor will talk to you about things you can do to improve your health and lower your risk of health problems. They will also offer help and support. For example, if you want to quit smoking, they can give you advice and might prescribe medicines. If you want to improve your diet or get more physical activity, they can help you with this, too.   Lab tests, if needed - The tests you get will depend on your age and situation. For example, your doctor might want to check your:   Cholesterol   Blood sugar   Iron level   Vaccines - The recommended vaccines will depend on your age, health, and what vaccines you already had. Vaccines are very important because they can prevent certain serious or deadly infections.   Discussion of screening - \"Screening\" means checking for diseases or other health problems before they cause symptoms. Your doctor can recommend screening based on your age, risk, and preferences. This might include tests to check for:   Cancer, such as breast, prostate, cervical, ovarian, colorectal, prostate, lung, or skin cancer   Sexually transmitted infections, such as chlamydia and gonorrhea   Mental health conditions like depression and anxiety  Your doctor will talk to you about the different types of screening tests. They can help you decide which screenings to have. They can also explain what the results might mean.   Answering questions - The physical is a good time to ask the doctor or nurse questions about your health. If needed, they can refer you to other doctors or specialists, too.  Adults older than 65 years often need other care, too. As you get older, your doctor will talk to you about:   How to prevent falling at " home   Hearing or vision tests   Memory testing   How to take your medicines safely   Making sure that you have the help and support you need at home  All topics are updated as new evidence becomes available and our peer review process is complete.  This topic retrieved from [employer] on: May 02, 2024.  Topic 271189 Version 1.0  Release: 32.4.3 - C32.122  © 2024 UpToDate, [employer] and/or its affiliates. All rights reserved.  Consumer Information Use and Disclaimer   Disclaimer: This generalized information is a limited summary of diagnosis, treatment, and/or medication information. It is not meant to be comprehensive and should be used as a tool to help the user understand and/or assess potential diagnostic and treatment options. It does NOT include all information about conditions, treatments, medications, side effects, or risks that may apply to a specific patient. It is not intended to be medical advice or a substitute for the medical advice, diagnosis, or treatment of a health care provider based on the health care provider's examination and assessment of a patient's specific and unique circumstances. Patients must speak with a health care provider for complete information about their health, medical questions, and treatment options, including any risks or benefits regarding use of medications. This information does not endorse any treatments or medications as safe, effective, or approved for treating a specific patient. Up[employer], Inc. and its affiliates disclaim any warranty or liability relating to this information or the use thereof.The use of this information is governed by the Terms of Use, available at https://www.[employer].com/en/know/clinical-effectiveness-terms. 2024© UpToDate, Inc. and its affiliates and/or licensors. All rights reserved.  Copyright   © 2024 [employer], [employer]. and/or its affiliates. All rights reserved.

## 2025-02-10 ENCOUNTER — OFFICE VISIT (OUTPATIENT)
Dept: FAMILY MEDICINE | Facility: OTHER | Age: 85
End: 2025-02-10
Payer: COMMERCIAL

## 2025-02-10 ENCOUNTER — ANCILLARY PROCEDURE (OUTPATIENT)
Dept: GENERAL RADIOLOGY | Facility: OTHER | Age: 85
End: 2025-02-10
Attending: PHYSICIAN ASSISTANT
Payer: COMMERCIAL

## 2025-02-10 ENCOUNTER — ALLIED HEALTH/NURSE VISIT (OUTPATIENT)
Dept: AUDIOLOGY | Facility: OTHER | Age: 85
End: 2025-02-10
Payer: COMMERCIAL

## 2025-02-10 VITALS
BODY MASS INDEX: 36.54 KG/M2 | OXYGEN SATURATION: 95 % | TEMPERATURE: 98.3 F | RESPIRATION RATE: 20 BRPM | WEIGHT: 214 LBS | DIASTOLIC BLOOD PRESSURE: 82 MMHG | HEART RATE: 66 BPM | HEIGHT: 64 IN | SYSTOLIC BLOOD PRESSURE: 138 MMHG

## 2025-02-10 DIAGNOSIS — E03.9 HYPOTHYROIDISM, UNSPECIFIED TYPE: ICD-10-CM

## 2025-02-10 DIAGNOSIS — G89.29 CHRONIC HEEL PAIN, LEFT: ICD-10-CM

## 2025-02-10 DIAGNOSIS — I10 ESSENTIAL HYPERTENSION WITH GOAL BLOOD PRESSURE LESS THAN 140/90: ICD-10-CM

## 2025-02-10 DIAGNOSIS — K21.9 GASTROESOPHAGEAL REFLUX DISEASE WITHOUT ESOPHAGITIS: ICD-10-CM

## 2025-02-10 DIAGNOSIS — E11.22 DIABETES MELLITUS WITH STAGE 3 CHRONIC KIDNEY DISEASE (H): ICD-10-CM

## 2025-02-10 DIAGNOSIS — N18.30 DIABETES MELLITUS WITH STAGE 3 CHRONIC KIDNEY DISEASE (H): ICD-10-CM

## 2025-02-10 DIAGNOSIS — M51.362 DEGENERATION OF INTERVERTEBRAL DISC OF LUMBAR REGION WITH DISCOGENIC BACK PAIN AND LOWER EXTREMITY PAIN: ICD-10-CM

## 2025-02-10 DIAGNOSIS — Z98.1 S/P SPINAL FUSION: ICD-10-CM

## 2025-02-10 DIAGNOSIS — D64.9 ANEMIA, UNSPECIFIED TYPE: ICD-10-CM

## 2025-02-10 DIAGNOSIS — E78.5 HYPERLIPIDEMIA WITH TARGET LDL LESS THAN 100: ICD-10-CM

## 2025-02-10 DIAGNOSIS — H90.3 SENSORINEURAL HEARING LOSS, BILATERAL: Primary | ICD-10-CM

## 2025-02-10 DIAGNOSIS — E11.9 TYPE 2 DIABETES MELLITUS WITHOUT COMPLICATION, WITHOUT LONG-TERM CURRENT USE OF INSULIN (H): ICD-10-CM

## 2025-02-10 DIAGNOSIS — Z00.00 ENCOUNTER FOR MEDICARE ANNUAL WELLNESS EXAM: Primary | ICD-10-CM

## 2025-02-10 DIAGNOSIS — I10 ESSENTIAL HYPERTENSION: ICD-10-CM

## 2025-02-10 DIAGNOSIS — M79.672 CHRONIC HEEL PAIN, LEFT: ICD-10-CM

## 2025-02-10 DIAGNOSIS — N25.81 SECONDARY RENAL HYPERPARATHYROIDISM: ICD-10-CM

## 2025-02-10 DIAGNOSIS — N18.32 CKD STAGE 3B, GFR 30-44 ML/MIN (H): ICD-10-CM

## 2025-02-10 DIAGNOSIS — N32.81 OAB (OVERACTIVE BLADDER): ICD-10-CM

## 2025-02-10 LAB
ALBUMIN SERPL BCG-MCNC: 4.4 G/DL (ref 3.5–5.2)
ALP SERPL-CCNC: 98 U/L (ref 40–150)
ALT SERPL W P-5'-P-CCNC: 29 U/L (ref 0–50)
ANION GAP SERPL CALCULATED.3IONS-SCNC: 12 MMOL/L (ref 7–15)
AST SERPL W P-5'-P-CCNC: 28 U/L (ref 0–45)
BILIRUB SERPL-MCNC: 0.4 MG/DL
BUN SERPL-MCNC: 13.7 MG/DL (ref 8–23)
CALCIUM SERPL-MCNC: 9.4 MG/DL (ref 8.8–10.4)
CHLORIDE SERPL-SCNC: 106 MMOL/L (ref 98–107)
CHOLEST SERPL-MCNC: 189 MG/DL
CREAT SERPL-MCNC: 1 MG/DL (ref 0.51–0.95)
CREAT UR-MCNC: 240 MG/DL
EGFRCR SERPLBLD CKD-EPI 2021: 56 ML/MIN/1.73M2
EST. AVERAGE GLUCOSE BLD GHB EST-MCNC: 146 MG/DL
FASTING STATUS PATIENT QL REPORTED: NO
FASTING STATUS PATIENT QL REPORTED: NO
FERRITIN SERPL-MCNC: 151 NG/ML (ref 11–328)
GLUCOSE SERPL-MCNC: 139 MG/DL (ref 70–99)
HBA1C MFR BLD: 6.7 % (ref 0–5.6)
HCO3 SERPL-SCNC: 25 MMOL/L (ref 22–29)
HDLC SERPL-MCNC: 43 MG/DL
HGB BLD-MCNC: 14 G/DL (ref 11.7–15.7)
IRON BINDING CAPACITY (ROCHE): 297 UG/DL (ref 240–430)
IRON SATN MFR SERPL: 33 % (ref 15–46)
IRON SERPL-MCNC: 97 UG/DL (ref 37–145)
LDLC SERPL CALC-MCNC: 86 MG/DL
MICROALBUMIN UR-MCNC: 248 MG/L
MICROALBUMIN/CREAT UR: 103.33 MG/G CR (ref 0–25)
NONHDLC SERPL-MCNC: 146 MG/DL
PHOSPHATE SERPL-MCNC: 3.1 MG/DL (ref 2.5–4.5)
POTASSIUM SERPL-SCNC: 3.5 MMOL/L (ref 3.4–5.3)
PROT SERPL-MCNC: 7 G/DL (ref 6.4–8.3)
PTH-INTACT SERPL-MCNC: 70 PG/ML (ref 15–65)
SODIUM SERPL-SCNC: 143 MMOL/L (ref 135–145)
T4 FREE SERPL-MCNC: 1.22 NG/DL (ref 0.9–1.7)
TRIGL SERPL-MCNC: 301 MG/DL
TSH SERPL DL<=0.005 MIU/L-ACNC: 4.32 UIU/ML (ref 0.3–4.2)
VIT D+METAB SERPL-MCNC: 10 NG/ML (ref 20–50)

## 2025-02-10 PROCEDURE — V5014 HEARING AID REPAIR/MODIFYING: HCPCS | Performed by: AUDIOLOGIST

## 2025-02-10 PROCEDURE — 83540 ASSAY OF IRON: CPT | Performed by: PHYSICIAN ASSISTANT

## 2025-02-10 PROCEDURE — 80053 COMPREHEN METABOLIC PANEL: CPT | Performed by: PHYSICIAN ASSISTANT

## 2025-02-10 PROCEDURE — G0439 PPPS, SUBSEQ VISIT: HCPCS | Performed by: PHYSICIAN ASSISTANT

## 2025-02-10 PROCEDURE — 83970 ASSAY OF PARATHORMONE: CPT | Performed by: PHYSICIAN ASSISTANT

## 2025-02-10 PROCEDURE — 85018 HEMOGLOBIN: CPT | Performed by: PHYSICIAN ASSISTANT

## 2025-02-10 PROCEDURE — 82728 ASSAY OF FERRITIN: CPT | Performed by: PHYSICIAN ASSISTANT

## 2025-02-10 PROCEDURE — 82306 VITAMIN D 25 HYDROXY: CPT | Performed by: PHYSICIAN ASSISTANT

## 2025-02-10 PROCEDURE — 82043 UR ALBUMIN QUANTITATIVE: CPT | Performed by: PHYSICIAN ASSISTANT

## 2025-02-10 PROCEDURE — 36415 COLL VENOUS BLD VENIPUNCTURE: CPT | Performed by: PHYSICIAN ASSISTANT

## 2025-02-10 PROCEDURE — 73630 X-RAY EXAM OF FOOT: CPT | Mod: TC | Performed by: RADIOLOGY

## 2025-02-10 PROCEDURE — 84439 ASSAY OF FREE THYROXINE: CPT | Performed by: PHYSICIAN ASSISTANT

## 2025-02-10 PROCEDURE — 84443 ASSAY THYROID STIM HORMONE: CPT | Performed by: PHYSICIAN ASSISTANT

## 2025-02-10 PROCEDURE — 83036 HEMOGLOBIN GLYCOSYLATED A1C: CPT | Performed by: PHYSICIAN ASSISTANT

## 2025-02-10 PROCEDURE — 83550 IRON BINDING TEST: CPT | Performed by: PHYSICIAN ASSISTANT

## 2025-02-10 PROCEDURE — 99207 PR FOOT EXAM NO CHARGE: CPT | Performed by: PHYSICIAN ASSISTANT

## 2025-02-10 PROCEDURE — 84100 ASSAY OF PHOSPHORUS: CPT | Performed by: PHYSICIAN ASSISTANT

## 2025-02-10 PROCEDURE — 82570 ASSAY OF URINE CREATININE: CPT | Performed by: PHYSICIAN ASSISTANT

## 2025-02-10 PROCEDURE — 80061 LIPID PANEL: CPT | Performed by: PHYSICIAN ASSISTANT

## 2025-02-10 PROCEDURE — 99214 OFFICE O/P EST MOD 30 MIN: CPT | Mod: 25 | Performed by: PHYSICIAN ASSISTANT

## 2025-02-10 RX ORDER — OXYBUTYNIN CHLORIDE 10 MG/1
10 TABLET, EXTENDED RELEASE ORAL AT BEDTIME
Qty: 90 TABLET | Refills: 3 | Status: SHIPPED | OUTPATIENT
Start: 2025-02-10

## 2025-02-10 RX ORDER — PANTOPRAZOLE SODIUM 40 MG/1
40 TABLET, DELAYED RELEASE ORAL DAILY
Qty: 90 TABLET | Refills: 3 | Status: SHIPPED | OUTPATIENT
Start: 2025-02-10

## 2025-02-10 RX ORDER — FERROUS SULFATE 325(65) MG
325 TABLET ORAL
Qty: 90 TABLET | Refills: 3 | Status: SHIPPED | OUTPATIENT
Start: 2025-02-10

## 2025-02-10 RX ORDER — DILTIAZEM HYDROCHLORIDE 180 MG/1
180 CAPSULE, COATED, EXTENDED RELEASE ORAL DAILY
Qty: 90 CAPSULE | Refills: 3 | Status: SHIPPED | OUTPATIENT
Start: 2025-02-10

## 2025-02-10 RX ORDER — OXYCODONE HYDROCHLORIDE 5 MG/1
5 TABLET ORAL EVERY 6 HOURS PRN
Qty: 90 TABLET | Refills: 0 | Status: SHIPPED | OUTPATIENT
Start: 2025-02-25

## 2025-02-10 RX ORDER — LEVOTHYROXINE SODIUM 100 UG/1
100 TABLET ORAL DAILY
Qty: 90 TABLET | Refills: 3 | Status: SHIPPED | OUTPATIENT
Start: 2025-02-10

## 2025-02-10 RX ORDER — CHOLESTYRAMINE 4 G/9G
1 POWDER, FOR SUSPENSION ORAL 2 TIMES DAILY WITH MEALS
Qty: 180 PACKET | Refills: 3 | Status: SHIPPED | OUTPATIENT
Start: 2025-02-10

## 2025-02-10 SDOH — HEALTH STABILITY: PHYSICAL HEALTH: ON AVERAGE, HOW MANY DAYS PER WEEK DO YOU ENGAGE IN MODERATE TO STRENUOUS EXERCISE (LIKE A BRISK WALK)?: 2 DAYS

## 2025-02-10 SDOH — HEALTH STABILITY: PHYSICAL HEALTH: ON AVERAGE, HOW MANY MINUTES DO YOU ENGAGE IN EXERCISE AT THIS LEVEL?: 10 MIN

## 2025-02-10 ASSESSMENT — PAIN SCALES - PAIN ENJOYMENT GENERAL ACTIVITY SCALE (PEG)
AVG_PAIN_PASTWEEK: 2
INTERFERED_ENJOYMENT_LIFE: 0
PEG_TOTALSCORE: .67
AVG_PAIN_PASTWEEK: 2
INTERFERED_GENERAL_ACTIVITY: 0 - DOES NOT INTERFERE
INTERFERED_ENJOYMENT_LIFE: 0 - DOES NOT INTERFERE
INTERFERED_GENERAL_ACTIVITY: 0
PEG_TOTALSCORE: 0.67

## 2025-02-10 ASSESSMENT — ANXIETY QUESTIONNAIRES
8. IF YOU CHECKED OFF ANY PROBLEMS, HOW DIFFICULT HAVE THESE MADE IT FOR YOU TO DO YOUR WORK, TAKE CARE OF THINGS AT HOME, OR GET ALONG WITH OTHER PEOPLE?: NOT DIFFICULT AT ALL
6. BECOMING EASILY ANNOYED OR IRRITABLE: SEVERAL DAYS
GAD7 TOTAL SCORE: 3
3. WORRYING TOO MUCH ABOUT DIFFERENT THINGS: NOT AT ALL
GAD7 TOTAL SCORE: 3
7. FEELING AFRAID AS IF SOMETHING AWFUL MIGHT HAPPEN: NOT AT ALL
5. BEING SO RESTLESS THAT IT IS HARD TO SIT STILL: NOT AT ALL
4. TROUBLE RELAXING: SEVERAL DAYS
2. NOT BEING ABLE TO STOP OR CONTROL WORRYING: SEVERAL DAYS
1. FEELING NERVOUS, ANXIOUS, OR ON EDGE: NOT AT ALL
IF YOU CHECKED OFF ANY PROBLEMS ON THIS QUESTIONNAIRE, HOW DIFFICULT HAVE THESE PROBLEMS MADE IT FOR YOU TO DO YOUR WORK, TAKE CARE OF THINGS AT HOME, OR GET ALONG WITH OTHER PEOPLE: NOT DIFFICULT AT ALL
GAD7 TOTAL SCORE: 3
7. FEELING AFRAID AS IF SOMETHING AWFUL MIGHT HAPPEN: NOT AT ALL

## 2025-02-10 ASSESSMENT — PATIENT HEALTH QUESTIONNAIRE - PHQ9
SUM OF ALL RESPONSES TO PHQ QUESTIONS 1-9: 7
10. IF YOU CHECKED OFF ANY PROBLEMS, HOW DIFFICULT HAVE THESE PROBLEMS MADE IT FOR YOU TO DO YOUR WORK, TAKE CARE OF THINGS AT HOME, OR GET ALONG WITH OTHER PEOPLE: SOMEWHAT DIFFICULT
SUM OF ALL RESPONSES TO PHQ QUESTIONS 1-9: 7

## 2025-02-10 ASSESSMENT — SOCIAL DETERMINANTS OF HEALTH (SDOH): HOW OFTEN DO YOU GET TOGETHER WITH FRIENDS OR RELATIVES?: ONCE A WEEK

## 2025-02-10 ASSESSMENT — PAIN SCALES - GENERAL: PAINLEVEL_OUTOF10: SEVERE PAIN (8)

## 2025-02-10 NOTE — PROGRESS NOTES
Preventive Care Visit  [facility]  Kisha Stinson PA-C, Family Medicine  Feb 10, 2025    Assessment & Plan     ICD-10-CM    1. Encounter for Medicare annual wellness exam  Z00.00       2. Hyperlipidemia with target LDL less than 100  E78.5 Comprehensive metabolic panel (BMP + Alb, Alk Phos, ALT, AST, Total. Bili, TP)     cholestyramine (QUESTRAN) 4 g packet     Comprehensive metabolic panel (BMP + Alb, Alk Phos, ALT, AST, Total. Bili, TP)     OFFICE/OUTPT VISIT,EST,LEVL IV      3. Essential hypertension with goal blood pressure less than 140/90  I10 Comprehensive metabolic panel (BMP + Alb, Alk Phos, ALT, AST, Total. Bili, TP)     Comprehensive metabolic panel (BMP + Alb, Alk Phos, ALT, AST, Total. Bili, TP)     OFFICE/OUTPT VISIT,EST,LEVL IV      4. Type 2 diabetes mellitus without complication, without long-term current use of insulin (H)  E11.9 Comprehensive metabolic panel (BMP + Alb, Alk Phos, ALT, AST, Total. Bili, TP)     Comprehensive metabolic panel (BMP + Alb, Alk Phos, ALT, AST, Total. Bili, TP)     OFFICE/OUTPT VISIT,EST,LEVL IV      5. Essential hypertension  I10 diltiazem ER COATED BEADS (CARDIZEM CD/CARTIA XT) 180 MG 24 hr capsule     OFFICE/OUTPT VISIT,EST,LEVL IV      6. OAB (overactive bladder)  N32.81 oxyBUTYnin ER (DITROPAN XL) 10 MG 24 hr tablet     OFFICE/OUTPT VISIT,EST,LEVL IV      7. Hypothyroidism, unspecified type  E03.9 levothyroxine (SYNTHROID/LEVOTHROID) 100 MCG tablet     TSH WITH FREE T4 REFLEX     OFFICE/OUTPT VISIT,EST,LEVL IV      8. Anemia, unspecified type  D64.9 ferrous sulfate (FEROSUL) 325 (65 Fe) MG tablet     Iron and iron binding capacity     Ferritin     OFFICE/OUTPT VISIT,EST,LEVL IV      9. Gastroesophageal reflux disease without esophagitis  K21.9 pantoprazole (PROTONIX) 40 MG EC tablet     OFFICE/OUTPT VISIT,EST,LEVL IV      10. S/P spinal fusion  Z98.1 oxyCODONE (ROXICODONE) 5 MG tablet     OFFICE/OUTPT VISIT,EST,LEVL IV       11. Degeneration of intervertebral disc of lumbar region with discogenic back pain and lower extremity pain  M51.362 oxyCODONE (ROXICODONE) 5 MG tablet     OFFICE/OUTPT VISIT,EST,LEVL IV      12. Chronic heel pain, left  M79.672 XR Foot Left G/E 3 Views    G89.29 Orthopedic  Referral     OFFICE/OUTPT VISIT,EST,LEVL IV      13. CKD stage 3b, GFR 30-44 ml/min (H)  N18.32 Hemoglobin     Parathyroid Hormone Intact     Phosphorus     OFFICE/OUTPT VISIT,EST,LEVL IV     CANCELED: Renal panel      14. Secondary renal hyperparathyroidism  N25.81 Vitamin D Deficiency     OFFICE/OUTPT VISIT,EST,LEVL IV      15. Diabetes mellitus with stage 3 chronic kidney disease (H)  E11.22 HEMOGLOBIN A1C    N18.30 Lipid panel reflex to direct LDL Non-fasting     Albumin Random Urine Quantitative with Creat Ratio     OFFICE/OUTPT VISIT,EST,LEVL IV        Patient has been advised of split billing requirements and indicates understanding: Yes    Counseling  Appropriate preventive services were addressed with this patient via screening, questionnaire, or discussion as appropriate for fall prevention, nutrition, physical activity, Tobacco-use cessation, social engagement, weight loss and cognition.  Checklist reviewing preventive services available has been given to the patient.  Reviewed patient's diet, addressing concerns and/or questions.   She is at risk for lack of exercise and has been provided with information to increase physical activity for the benefit of her well-being.   Addressed any concerns about safety while driving.  The patient was provided with written information regarding signs of hearing loss.   Information on urinary incontinence and treatment options given to patient.   The patient's PHQ-9 score is consistent with mild depression. She was provided with information regarding depression.   I have reviewed Opioid Use Disorder and Substance Use Disorder risk factors and made any needed referrals.     - Patient  reports doing well overall except her knee and foot      Has apt with ortho regarding her ongoing knee pain        Foot - mostly heel and jewels's attachment      Recommend X-ray which did confirm heel spur      Recommend podiatry for further evaluation and treatment as she can feel it with every step     - Chronic issues     Doing well      Will update fasting labs today  - Results will be communicated to patient when available and appropriate medication changes made if necessary   - Reviewed all medications at length including use and side effects      Refilled as needed   - Of note, unclear from patient and chart review why statin was stopped this summer, but will await labs     - Back pain      Stable using mostly 1 Oxycodone 5 mg at night so can sleep       reviewed, no concerns      Reviewed medication use and side effects including sedation, addiction, and constipation         The patient indicates understanding of these issues and agrees with the plan.    Follow up: with specialist, 1 year pending normal labs     Carlos Stinson PA-C  [facility] 84 [facility], presenting for the following:  Physical        2/10/2025     8:21 AM   Additional Questions   Roomed by Kiya   Accompanied by Daughter: April         2/10/2025     8:21 AM   Patient Reported Additional Medications   Patient reports taking the following new medications NA       History of Present Illness       Diabetes:   She presents for follow up of diabetes.    She is not checking blood glucose.         She has no concerns regarding her diabetes at this time.  She is having excessive thirst and blurry vision.             - No pravastatin since this summer     Hypertension Follow-up    Do you check your blood pressure regularly outside of the clinic? No   Are you following a low salt diet? Yes  Are your blood pressures ever more than 140 on the top number (systolic) OR more   than  90 on the bottom number (diastolic), for example 140/90? N/A    Hypothyroidism Follow-up    Since last visit, patient describes the following symptoms: Weight stable, no hair loss, no skin changes, no constipation, no loose stools      Pain History:  When did you first notice your pain? Patient had Cortizone shot in Rt Knee July 2024, pain came back December 2024   Have you seen this provider for your pain in the past? Yes   Where in your body do you have pain? Rt knee  Are you seeing anyone else for your pain? Yes - pt has appointment with Orthopedic provider 2/11/25 Dr. Choe    - Oxycodone pretty much one at night     - Left foot - heel is bothering      Can feel when walking      Hurts to put on shoes     6+ weeks     Chronic Pain Follow Up:    Location of pain: Rt Knee  Analgesia/pain control:    - Recent changes:  pain came back in December 2024 getting worse    - Overall control: Tolerable with discomfort    - Current treatments: medication only   Adherence:     - Do you ever take more pain medicine than prescribed? No    - When did you take your last dose of pain medicine?  7am tylenol, oxycodone is taken at night time before bed last dose 2/9/25   Adverse effects: No   PDMP Review         Value Time User    State PDMP site checked  Yes 2/10/2025  9:15 AM Kisha Stinson PA-C          Last CSA Agreement:   CSA -- Patient Level:    CSA: None found at the patient level.       Health Care Directive  Patient has a Health Care Directive on file  Advance care planning document is on file and is current.      2/10/2025   General Health   How would you rate your overall physical health? Good   Feel stress (tense, anxious, or unable to sleep) Not at all         2/10/2025   Nutrition   Diet: Regular (no restrictions)         2/10/2025   Exercise   Days per week of moderate/strenous exercise 2 days   Average minutes spent exercising at this level 10 min   (!) EXERCISE CONCERN      2/10/2025   Social  Factors   Frequency of gathering with friends or relatives Once a week   Worry food won't last until get money to buy more No   Food not last or not have enough money for food? No   Do you have housing? (Housing is defined as stable permanent housing and does not include staying ouside in a car, in a tent, in an abandoned building, in an overnight shelter, or couch-surfing.) Yes   Are you worried about losing your housing? No   Lack of transportation? No   Unable to get utilities (heat,electricity)? No         2/10/2025   Fall Risk   Fallen 2 or more times in the past year? No   Trouble with walking or balance? Yes   Gait Speed Test (Document in seconds) 8.48   Gait Speed Test Interpretation Greater than 5.01 seconds - ABNORMAL          2/10/2025   Activities of Daily Living- Home Safety   Needs help with the following daily activites None of the above   Safety concerns in the home None of the above         2/10/2025   Dental   Dentist two times every year? Yes         2/10/2025   Hearing Screening   Hearing concerns? (!) I NEED TO ASK PEOPLE TO SPEAK UP OR REPEAT THEMSELVES.         2/10/2025   Driving Risk Screening   Patient/family members have concerns about driving (!) YES          2/10/2025   General Alertness/Fatigue Screening   Have you been more tired than usual lately? No         2/10/2025   Urinary Incontinence Screening   Bothered by leaking urine in past 6 months Yes          Today's PHQ-9 Score:       2/10/2025     7:57 AM   PHQ-9 SCORE   PHQ-9 Total Score MyChart 7 (Mild depression)   PHQ-9 Total Score 7        Patient-reported         2/10/2025   Substance Use   Alcohol more than 3/day or more than 7/wk No   Do you have a current opioid prescription? (!) YES   How severe/bad is pain from 1 to 10? 0/10 (No Pain)   Do you use any other substances recreationally? No            No data to display              Social History     Tobacco Use    Smoking status: Never     Passive exposure: Never    Smokeless  tobacco: Never   Vaping Use    Vaping status: Never Used   Substance Use Topics    Alcohol use: Yes    Drug use: No           11/23/2022   LAST FHS-7 RESULTS   1st degree relative breast or ovarian cancer Yes   Any relative bilateral breast cancer No   Any male have breast cancer No   Any ONE woman have BOTH breast AND ovarian cancer No   Any woman with breast cancer before 50yrs No   2 or more relatives with breast AND/OR ovarian cancer No   2 or more relatives with breast AND/OR bowel cancer No        Mammogram Screening - Mammography discussed and declined    Reviewed and updated as needed this visit by Provider   Tobacco  Allergies  Meds  Problems  Med Hx  Surg Hx  Fam Hx            Past Medical History:   Diagnosis Date    Acute cholecystitis 3/23/2018    Acute kidney injury 9/10/2017    Arthritis     Dehydration 9/4/2017    Diabetes type 2, controlled (H)     Elevated lactic acid level 9/10/2017    GERD (gastroesophageal reflux disease)     History of renal calculi     HTN, goal below 140/90     Hyperlipidaemia LDL goal < 100     Hypokalemia 9/3/2017    Hypothyroid     Insomnia     Left carotid stenosis     Migraine     Motion sickness     PONV (postoperative nausea and vomiting)     Sciatica of right side 6/28/2013    Type 2 diabetes mellitus without complication, without long-term current use of insulin (H) 2/16/2017     Past Surgical History:   Procedure Laterality Date    BUNIONECTOMY      Right foot    CATARACT IOL, RT/LT Bilateral 2017    COLONOSCOPY  7/12/2013    Procedure: COLONOSCOPY;  Colonoscopy;  Surgeon: Giovany Espinoza MD;  Location: [facility]    COLONOSCOPY N/A 1/31/2019    Procedure: Combined Colonoscopy, Single Or Multiple Biopsy/Polypectomy By Biopsy;  Surgeon: Efren Osorio MD;  Location: [facility]    COLONOSCOPY WITH CO2 INSUFFLATION N/A 1/31/2019    Procedure: COLONOSCOPY WITH CO2 INSUFFLATION;  Surgeon: Efren Osorio MD;  Location: [facility]    FRACTURE TX,  ANKLE RT/LT      Left ankle    FUSION, SPINE, LUMBAR, 3 OR MORE LEVELS, POSTERIOR APPROACH, ROBOTIC-ASSISTED N/A 10/25/2023    Procedure: Thoracic 11 to pelvis robotic decompression and fusion;  Surgeon: Mumtaz Waite MD;  Location: [facility]    HC CYSTOURETHROSCOPY W/ URETEROSCOPY &/OR PYELOSCOPY; W/ LITHOTRIPSY      HC REVISE MEDIAN N/CARPAL TUNNEL SURG      Right wrist    INJECT EPIDURAL LUMBAR Right 12/27/2018    Procedure: INJECT EPIDURAL LUMBAR right foraminal narrowing severe at L4-L5 and moderate at L5-S1;  Surgeon: Gilbert Mcclure MD;  Location: [facility]    INJECT EPIDURAL TRANSFORAMINAL Bilateral 5/27/2021    Procedure: Bilateral Lumbar 5-sacral 1 Epidural steroid injection;  Surgeon: Gilbert Mcclure MD;  Location: [facility]    INJECT EPIDURAL TRANSFORAMINAL Right 6/17/2022    Procedure: Attempted right Lumbar 4-5 and Lumbar 5-Sacral 1 epidural injections with completion of only the right Lumbar 5-Sacral1 Transforaminal Epidural Steroid Injection with fluoroscopic guidance and contrast;  Surgeon: Gilbert Mcclure MD;  Location: [facility]    INJECT EPIDURAL TRANSFORAMINAL Right 7/13/2023    Procedure: Lumbar 3-4 interlaminar epidural steroid injection using fluoroscopic guidance with contrast dye, Right;  Surgeon: Gilbert Mcclure MD;  Location: [facility]    INJECT JOINT SACROILIAC  4/10/2014    Procedure: INJECT JOINT SACROILIAC;  Sacroiliac Joint Injection;  Surgeon: Gilbert Mcclure MD;  [facility]: [facility]    INJECT JOINT SACROILIAC Left 4/11/2019    Procedure: INJECT JOINT SACROILIAC- LEFT;  Surgeon: Gilbert Mcclure MD;  [facility]: [facility]    INJECT JOINT SACROILIAC Right 3/3/2023    Procedure: Fluoroscopically-guided injection into the Right Sacroiliac joint;  Surgeon: Gilbert Mcclure MD;  Location: [facility]    IR CVC TUNNEL PLACEMENT > 5 YRS OF AGE  10/30/2023    IR CVC TUNNEL REMOVAL RIGHT  11/24/2023    LAPAROSCOPIC CHOLECYSTECTOMY N/A 3/24/2018    Procedure: LAPAROSCOPIC CHOLECYSTECTOMY;  Laparoscopic Cholecystectomy;   Surgeon: Berry Allen DO;  Location: [facility]     Lab work is in process  Labs reviewed in EPIC  Current providers sharing in care for this patient include:  Patient Care Team:  No Ref-Primary, Physician as PCP - Reuben Gross MD as Assigned Surgical Provider  Brittni Kilpatrick DO as Assigned Nephrology Provider  Tete Mccullough NP as Nurse Practitioner (Neurological Surgery)  Kisha Stinson PA-C as Assigned PCP  Kisha Stinson PA-C as Assigned Pain Medication Provider  Rhina Gregory NP as Assigned Neuroscience Provider  Naman Choe MD as MD (Orthopaedic Surgery)    The following health maintenance items are reviewed in Epic and correct as of today:  Health Maintenance   Topic Date Due    HEPATITIS B IMMUNIZATION (1 of 3 - Risk Dialysis 4-dose series) Never done    RSV VACCINE (1 - 1-dose 75+ series) Never done    MICROALBUMIN  11/02/2023    LIPID  05/02/2024    DIABETIC FOOT EXAM  10/19/2024    TSH W/FREE T4 REFLEX  10/31/2024    DTAP/TDAP/TD IMMUNIZATION (2 - Td or Tdap) 04/08/2025    COVID-19 Vaccine (9 - 2024-25 season) 04/21/2025    A1C  08/10/2025    BMP  11/11/2025    EYE EXAM  01/06/2026    MEDICARE ANNUAL WELLNESS VISIT  02/10/2026    ANNUAL REVIEW OF HM ORDERS  02/10/2026    FALL RISK ASSESSMENT  02/10/2026    HEMOGLOBIN  02/10/2026    ADVANCE CARE PLANNING  02/10/2030    PARATHYROID  Completed    PHOSPHORUS  Completed    PHQ-2 (once per calendar year)  Completed    INFLUENZA VACCINE  Completed    Pneumococcal Vaccine: 50+ Years  Completed    URINALYSIS  Completed    ALK PHOS  Completed    ZOSTER IMMUNIZATION  Completed    HPV IMMUNIZATION  Aged Out    MENINGITIS IMMUNIZATION  Aged Out    DEXA  Discontinued         Review of Systems  Constitutional, neuro, ENT, endocrine, pulmonary, cardiac, gastrointestinal, genitourinary, musculoskeletal, integument and psychiatric systems are negative, except as otherwise noted.    "  Objective    Exam  /82   Pulse 66   Temp 98.3  F (36.8  C) (Temporal)   Resp 20   Ht 1.623 m (5' 3.9\")   Wt 97.1 kg (214 lb)   LMP  (LMP Unknown)   SpO2 95%   BMI 36.85 kg/m     Estimated body mass index is 36.85 kg/m  as calculated from the following:    Height as of this encounter: 1.623 m (5' 3.9\").    Weight as of this encounter: 97.1 kg (214 lb).    Physical Exam  GENERAL APPEARANCE: healthy, alert and no distress  EYES: Eyes grossly normal to inspection, PERRLA, conjunctivae and sclerae without injection or discharge, EOM intact   RESP: Lungs clear to auscultation - no rales, rhonchi or wheezes    CV: Regular rates and rhythm, normal S1 S2, no S3 or S4, no murmur, click or rub, no peripheral edema and peripheral pulses strong and symmetric bilaterally   MS:   Left foot: Normal ROM, mildly tender to deep palpation of posterior aspect of heel and jewels's attachment, no edema, CMS intact, normal sensory exam, normal strength  No musculoskeletal defects are noted and gait is slow with walker  SKIN: No suspicious lesions or rashes, hydration status appears adeuqate with normal skin turgor   PSYCH: Alert and oriented x3; speech- coherent , normal rate and volume; able to articulate logical thoughts, able to abstract reason, no tangential thoughts, no hallucinations or delusions, mentation appears normal, Mood is euthymic. Affect is appropriate for this mood state and bright. Thought content is free of suicidal ideation, hallucinations, and delusions. Dress is adequate and upkept. Eye contact is good during conversation.      DIABETIC FOOT EXAM: Examination of the feet reveals normal posterior tibial and dorsalis pedis pulses. Skin to palpation and inspection is intact, without lesions, corns or calluses but very dry. No discoloration is present. Ten-gram monofilament nylon test reveals decreased sensation bilaterally over plantar surfaces of distal great toe, first, third, and fifth metatarsal " heads.      Diagnostics; reviewed in Epic, see orders pending in [facility]         2/10/2025   Mini Cog   Clock Draw Score 2 Normal   3 Item Recall 2 objects recalled   Mini Cog Total Score 4         Signed Electronically by: Kisha Stinson PA-C

## 2025-02-10 NOTE — PATIENT INSTRUCTIONS
Patient Education   Preventive Care Advice   This is general advice given by our system to help you stay healthy. However, your care team may have specific advice just for you. Please talk to your care team about your preventive care needs.  Nutrition  Eat 5 or more servings of fruits and vegetables each day.  Try wheat bread, brown rice and whole grain pasta (instead of white bread, rice, and pasta).  Get enough calcium and vitamin D. Check the label on foods and aim for 100% of the RDA (recommended daily allowance).  Lifestyle  Exercise at least 150 minutes each week  (30 minutes a day, 5 days a week).  Do muscle strengthening activities 2 days a week. These help control your weight and prevent disease.  No smoking.  Wear sunscreen to prevent skin cancer.  Have a dental exam and cleaning every 6 months.  Yearly exams  See your health care team every year to talk about:  Any changes in your health.  Any medicines your care team has prescribed.  Preventive care, family planning, and ways to prevent chronic diseases.  Shots (vaccines)   HPV shots (up to age 26), if you've never had them before.  Hepatitis B shots (up to age 59), if you've never had them before.  COVID-19 shot: Get this shot when it's due.  Flu shot: Get a flu shot every year.  Tetanus shot: Get a tetanus shot every 10 years.  Pneumococcal, hepatitis A, and RSV shots: Ask your care team if you need these based on your risk.  Shingles shot (for age 50 and up)  General health tests  Diabetes screening:  Starting at age 35, Get screened for diabetes at least every 3 years.  If you are younger than age 35, ask your care team if you should be screened for diabetes.  Cholesterol test: At age 39, start having a cholesterol test every 5 years, or more often if advised.  Bone density scan (DEXA): At age 50, ask your care team if you should have this scan for osteoporosis (brittle bones).  Hepatitis C: Get tested at least once in your life.  STIs (sexually  transmitted infections)  Before age 24: Ask your care team if you should be screened for STIs.  After age 24: Get screened for STIs if you're at risk. You are at risk for STIs (including HIV) if:  You are sexually active with more than one person.  You don't use condoms every time.  You or a partner was diagnosed with a sexually transmitted infection.  If you are at risk for HIV, ask about PrEP medicine to prevent HIV.  Get tested for HIV at least once in your life, whether you are at risk for HIV or not.  Cancer screening tests  Cervical cancer screening: If you have a cervix, begin getting regular cervical cancer screening tests starting at age 21.  Breast cancer scan (mammogram): If you've ever had breasts, begin having regular mammograms starting at age 40. This is a scan to check for breast cancer.  Colon cancer screening: It is important to start screening for colon cancer at age 45.  Have a colonoscopy test every 10 years (or more often if you're at risk) Or, ask your provider about stool tests like a FIT test every year or Cologuard test every 3 years.  To learn more about your testing options, visit:   .  For help making a decision, visit:   https://bit.ly/be27568.  Prostate cancer screening test: If you have a prostate, ask your care team if a prostate cancer screening test (PSA) at age 55 is right for you.  Lung cancer screening: If you are a current or former smoker ages 50 to 80, ask your care team if ongoing lung cancer screenings are right for you.  For informational purposes only. Not to replace the advice of your health care provider. Copyright   2023 [facility]. All rights reserved. Clinically reviewed by the [facility] Transitions Program. [employer] [id number] - REV 01/24.  Preventing Falls: Care Instructions  Injuries and health problems such as trouble walking or poor eyesight can increase your risk of falling. So can some medicines. But there are things you can do to help  "prevent falls. You can exercise to get stronger. You can also arrange your home to make it safer.    Talk to your doctor about the medicines you take. Ask if any of them increase the risk of falls and whether they can be changed or stopped.   Try to exercise regularly. It can help improve your strength and balance. This can help lower your risk of falling.         Practice fall safety and prevention.   Wear low-heeled shoes that fit well and give your feet good support. Talk to your doctor if you have foot problems that make this hard.  Carry a cellphone or wear a medical alert device that you can use to call for help.  Use stepladders instead of chairs to reach high objects. Don't climb if you're at risk for falls. Ask for help, if needed.  Wear the correct eyeglasses, if you need them.        Make your home safer.   Remove rugs, cords, clutter, and furniture from walkways.  Keep your house well lit. Use night-lights in hallways and bathrooms.  Install and use sturdy handrails on stairways.  Wear nonskid footwear, even inside. Don't walk barefoot or in socks without shoes.        Be safe outside.   Use handrails, curb cuts, and ramps whenever possible.  Keep your hands free by using a shoulder bag or backpack.  Try to walk in well-lit areas. Watch out for uneven ground, changes in pavement, and debris.  Be careful in the winter. Walk on the grass or gravel when sidewalks are slippery. Use de-icer on steps and walkways. Add non-slip devices to shoes.    Put grab bars and nonskid mats in your shower or tub and near the toilet. Try to use a shower chair or bath bench when bathing.   Get into a tub or shower by putting in your weaker leg first. Get out with your strong side first. Have a phone or medical alert device in the bathroom with you.   Where can you learn more?  Go to https://www.Rormixwise.net/patiented  Enter G117 in the search box to learn more about \"Preventing Falls: Care Instructions.\"  Current as of: " July 31, 2024  Content Version: 14.3    2024 [employer].   Care instructions adapted under license by your healthcare professional. If you have questions about a medical condition or this instruction, always ask your healthcare professional. [employer] disclaims any warranty or liability for your use of this information.    Hearing Loss: Care Instructions  Overview     Hearing loss is a sudden or slow decrease in how well you hear. It can range from slight to profound. Permanent hearing loss can occur with aging. It also can happen when you are exposed long-term to loud noise. Examples include listening to loud music, riding motorcycles, or being around other loud machines.  Hearing loss can affect your work and home life. It can make you feel lonely or depressed. You may feel that you have lost your independence. But hearing aids and other devices can help you hear better and feel connected to others.  Follow-up care is a key part of your treatment and safety. Be sure to make and go to all appointments, and call your doctor if you are having problems. It's also a good idea to know your test results and keep a list of the medicines you take.  How can you care for yourself at home?  Avoid loud noises whenever possible. This helps keep your hearing from getting worse.  Always wear hearing protection around loud noises.  Wear a hearing aid as directed.  A professional can help you pick a hearing aid that will work best for you.  You can also get hearing aids over the counter for mild to moderate hearing loss.  Have hearing tests as your doctor suggests. They can show whether your hearing has changed. Your hearing aid may need to be adjusted.  Use other devices as needed. These may include:  Telephone amplifiers and hearing aids that can connect to a television, stereo, radio, or microphone.  Devices that use lights or vibrations. These alert you to the doorbell, a ringing telephone, or a baby  "monitor.  Television closed-captioning. This shows the words at the bottom of the screen. Most new TVs can do this.  TTY (text telephone). This lets you type messages back and forth on the telephone instead of talking or listening. These devices are also called TDD. When messages are typed on the keyboard, they are sent over the phone line to a receiving TTY. The message is shown on a monitor.  Use text messaging, social media, and email if it is hard for you to communicate by telephone.  Try to learn a listening technique called speechreading. It is not lipreading. You pay attention to people's gestures, expressions, posture, and tone of voice. These clues can help you understand what a person is saying. Face the person you are talking to, and have them face you. Make sure the lighting is good. You need to see the other person's face clearly.  Think about counseling if you need help to adjust to your hearing loss.  When should you call for help?  Watch closely for changes in your health, and be sure to contact your doctor if:    You think your hearing is getting worse.     You have new symptoms, such as dizziness or nausea.   Where can you learn more?  Go to https://www.[employer].net/patiented  Enter R798 in the search box to learn more about \"Hearing Loss: Care Instructions.\"  Current as of: September 27, 2023  Content Version: 14.3    2024 [employer].   Care instructions adapted under license by your healthcare professional. If you have questions about a medical condition or this instruction, always ask your healthcare professional. [employer] disclaims any warranty or liability for your use of this information.    Bladder Training: Care Instructions  Your Care Instructions     Bladder training is used to treat urge incontinence and stress incontinence. Urge incontinence means that the need to urinate comes on so fast that you can't get to a toilet in time. Stress incontinence means that " you leak urine because of pressure on your bladder. For example, it may happen when you laugh, cough, or lift something heavy.  Bladder training can increase how long you can wait before you have to urinate. It can also help your bladder hold more urine. And it can give you better control over the urge to urinate.  It is important to remember that bladder training takes a few weeks to a few months to make a difference. You may not see results right away, but don't give up.  Follow-up care is a key part of your treatment and safety. Be sure to make and go to all appointments, and call your doctor if you are having problems. It's also a good idea to know your test results and keep a list of the medicines you take.  How can you care for yourself at home?  Work with your doctor to come up with a bladder training program that is right for you. You may use one or more of the following methods.  Delayed urination  In the beginning, try to keep from urinating for 5 minutes after you first feel the need to go.  While you wait, take deep, slow breaths to relax. Kegel exercises can also help you delay the need to go to the bathroom.  After some practice, when you can easily wait 5 minutes to urinate, try to wait 10 minutes before you urinate.  Slowly increase the waiting period until you are able to control when you have to urinate.  Scheduled urination  Empty your bladder when you first wake up in the morning.  Schedule times throughout the day when you will urinate.  Start by going to the bathroom every hour, even if you don't need to go.  Slowly increase the time between trips to the bathroom.  When you have found a schedule that works well for you, keep doing it.  If you wake up during the night and have to urinate, do it. Apply your schedule to waking hours only.  Kegel exercises  These tighten and strengthen pelvic muscles, which can help you control the flow of urine. (If doing these exercises causes pain, stop doing  "them and talk with your doctor.) To do Kegel exercises:  Squeeze your muscles as if you were trying not to pass gas. Or squeeze your muscles as if you were stopping the flow of urine. Your belly, legs, and buttocks shouldn't move.  Hold the squeeze for 3 seconds, then relax for 5 to 10 seconds.  Start with 3 seconds, then add 1 second each week until you are able to squeeze for 10 seconds.  Repeat the exercise 10 times a session. Do 3 to 8 sessions a day.  When should you call for help?  Watch closely for changes in your health, and be sure to contact your doctor if:    Your incontinence is getting worse.     You do not get better as expected.   Where can you learn more?  Go to https://www.[employer]Spot/patiented  Enter V684 in the search box to learn more about \"Bladder Training: Care Instructions.\"  Current as of: April 30, 2024  Content Version: 14.3    2024 [employer].   Care instructions adapted under license by your healthcare professional. If you have questions about a medical condition or this instruction, always ask your healthcare professional. [employer] disclaims any warranty or liability for your use of this information.    Learning About Depression Screening  What is depression screening?  Depression screening is a way to see if you have depression symptoms. It may be done by a doctor or counselor. It's often part of a routine checkup. That's because your mental health is just as important as your physical health.  Depression is a mental health condition that affects how you feel, think, and act. You may:  Have less energy.  Lose interest in your daily activities.  Feel sad and grouchy for a long time.  Depression is very common. It affects people of all ages.  Many things can lead to depression. Some people become depressed after they have a stroke or find out they have a major illness like cancer or heart disease. The death of a loved one or a breakup may lead to depression. " "It can run in families. Most experts believe that a combination of inherited genes and stressful life events can cause it.  What happens during screening?  You may be asked to fill out a form about your depression symptoms. You and the doctor will discuss your answers. The doctor may ask you more questions to learn more about how you think, act, and feel.  What happens after screening?  If you have symptoms of depression, your doctor will talk to you about your options.  Doctors usually treat depression with medicines or counseling. Often, combining the two works best. Many people don't get help because they think that they'll get over the depression on their own. But people with depression may not get better unless they get treatment.  The cause of depression is not well understood. There may be many factors involved. But if you have depression, it's not your fault.  A serious symptom of depression is thinking about death or suicide. If you or someone you care about talks about this or about feeling hopeless, get help right away.  It's important to know that depression can be treated. Medicine, counseling, and self-care may help.  Where can you learn more?  Go to https://www.[employer]pw.net/patiented  Enter T185 in the search box to learn more about \"Learning About Depression Screening.\"  Current as of: July 31, 2024  Content Version: 14.3    2024 [employer].   Care instructions adapted under license by your healthcare professional. If you have questions about a medical condition or this instruction, always ask your healthcare professional. [employer] disclaims any warranty or liability for your use of this information.    Chronic Pain: Care Instructions  Your Care Instructions     Chronic pain is pain that lasts a long time (months or even years) and may or may not have a clear cause. It is different from acute pain, which usually does have a clear cause--like an injury or illness--and gets " better over time. Chronic pain:  Lasts over time but may vary from day to day.  Does not go away despite efforts to end it.  May disrupt your sleep and lead to fatigue.  May cause depression or anxiety.  May make your muscles tense, causing more pain.  Can disrupt your work, hobbies, home life, and relationships with friends and family.  Chronic pain is a very real condition. It is not just in your head. Treatment can help and usually includes several methods used together, such as medicines, physical therapy, exercise, and other treatments. Learning how to relax and changing negative thought patterns can also help you cope.  Chronic pain is complex. Taking an active role in your treatment will help you better manage your pain. Tell your doctor if you have trouble dealing with your pain. You may have to try several things before you find what works best for you.  Follow-up care is a key part of your treatment and safety. Be sure to make and go to all appointments, and call your doctor if you are having problems. It's also a good idea to know your test results and keep a list of the medicines you take.  How can you care for yourself at home?  Pace yourself. Break up large jobs into smaller tasks. Save harder tasks for days when you have less pain, or go back and forth between hard tasks and easier ones. Take rest breaks.  Relax, and reduce stress. Relaxation techniques such as deep breathing or meditation can help.  Keep moving. Gentle, daily exercise can help reduce pain over the long run. Try low- or no-impact exercises such as walking, swimming, and stationary biking. Do stretches to stay flexible.  Try heat, cold packs, and massage.  Get enough sleep. Chronic pain can make you tired and drain your energy. Talk with your doctor if you have trouble sleeping because of pain.  Think positive. Your thoughts can affect your pain level. Do things that you enjoy to distract yourself when you have pain instead of focusing  on the pain. See a movie, read a book, listen to music, or spend time with a friend.  If you think you are depressed, talk to your doctor about treatment.  Keep a daily pain diary. Record how your moods, thoughts, sleep patterns, activities, and medicine affect your pain. You may find that your pain is worse during or after certain activities or when you are feeling a certain emotion. Having a record of your pain can help you and your doctor find the best ways to treat your pain.  Take pain medicines exactly as directed.  If the doctor gave you a prescription medicine for pain, take it as prescribed.  If you are not taking a prescription pain medicine, ask your doctor if you can take an over-the-counter medicine.  Reducing constipation caused by pain medicine  Talk to your doctor about a laxative. If a laxative doesn't work, your doctor may suggest a prescription medicine.  Include fruits, vegetables, beans, and whole grains in your diet each day. These foods are high in fiber.  If your doctor recommends it, get more exercise. Walking is a good choice. Bit by bit, increase the amount you walk every day. Try for at least 30 minutes on most days of the week.  Schedule time each day for a bowel movement. A daily routine may help. Take your time and do not strain when having a bowel movement.  When should you call for help?   Call your doctor now or seek immediate medical care if:    Your pain gets worse or is out of control.     You feel down or blue, or you do not enjoy things like you once did. You may be depressed, which is common in people with chronic pain. Depression can be treated.     You have vomiting or cramps for more than 2 hours.   Watch closely for changes in your health, and be sure to contact your doctor if:    You cannot sleep because of pain.     You are very worried or anxious about your pain.     You have trouble taking your pain medicine.     You have any concerns about your pain medicine.     You  "have trouble with bowel movements, such as:  No bowel movement in 3 days.  Blood in the anal area, in your stool, or on the toilet paper.  Diarrhea for more than 24 hours.   Where can you learn more?  Go to https://www.[employer].net/patiented  Enter N004 in the search box to learn more about \"Chronic Pain: Care Instructions.\"  Current as of: July 31, 2024  Content Version: 14.3    2024 [email or web address].   Care instructions adapted under license by your healthcare professional. If you have questions about a medical condition or this instruction, always ask your healthcare professional. [email or web address] disclaims any warranty or liability for your use of this information.       "

## 2025-02-10 NOTE — PROGRESS NOTES
HEARING AID DROP-OFF    Patient Name:  Mya Hui    Patient Age:   84 year old    :  1940    Background:   The patient dropped off her right hearing aid, reporting that it was broken.     SIDE: Right   MAKE: Phonak   MODEL: iTm V30-M   S/N: [id number]   WARRANTY:     Procedures:   Visual inspection revealed a broken slim tube. A listening check revealed good sound from the hearing aid alone with a new battery. The hearing aid was cleaned and checked, and the microphones were suctioned. The earmold was cleaned and the part of the slim tube remaining in the bore was removed. The slim tube was replaced with a right length 2 slim tube from stock, which was trimmed and lightly glued into the earmold. A listening check revealed good sound after cleaning.    Plan:   The patient was called and informed that her hearing aid is ready to . She was informed of the charge for the out of warranty clean and check. The patient will return as needed.      Corbin Mooney, CCC-A  Licensed Audiologist  2/10/2025

## 2025-02-10 NOTE — RESULT ENCOUNTER NOTE
Please call patient with the following message    Labs looked good. Cholesterol up only minimally, so can stay off the statin medication. Kidney function improved. Thyroid TSH is up a little but since T4 is normal we will leave medication dose alone.     Carlos Stinson PA-C

## 2025-02-11 ENCOUNTER — OFFICE VISIT (OUTPATIENT)
Dept: ORTHOPEDICS | Facility: CLINIC | Age: 85
End: 2025-02-11
Payer: COMMERCIAL

## 2025-02-11 ENCOUNTER — TELEPHONE (OUTPATIENT)
Dept: FAMILY MEDICINE | Facility: OTHER | Age: 85
End: 2025-02-11

## 2025-02-11 VITALS — WEIGHT: 213 LBS | RESPIRATION RATE: 20 BRPM | BODY MASS INDEX: 37.74 KG/M2 | HEIGHT: 63 IN

## 2025-02-11 DIAGNOSIS — M17.11 PRIMARY OSTEOARTHRITIS OF RIGHT KNEE: Primary | ICD-10-CM

## 2025-02-11 PROCEDURE — 20610 DRAIN/INJ JOINT/BURSA W/O US: CPT | Mod: RT | Performed by: ORTHOPAEDIC SURGERY

## 2025-02-11 RX ORDER — METHYLPREDNISOLONE ACETATE 80 MG/ML
80 INJECTION, SUSPENSION INTRA-ARTICULAR; INTRALESIONAL; INTRAMUSCULAR; SOFT TISSUE
Status: COMPLETED | OUTPATIENT
Start: 2025-02-11 | End: 2025-02-11

## 2025-02-11 RX ORDER — LIDOCAINE HYDROCHLORIDE 10 MG/ML
5 INJECTION, SOLUTION INFILTRATION; PERINEURAL
Status: COMPLETED | OUTPATIENT
Start: 2025-02-11 | End: 2025-02-11

## 2025-02-11 RX ADMIN — METHYLPREDNISOLONE ACETATE 80 MG: 80 INJECTION, SUSPENSION INTRA-ARTICULAR; INTRALESIONAL; INTRAMUSCULAR; SOFT TISSUE at 11:06

## 2025-02-11 RX ADMIN — LIDOCAINE HYDROCHLORIDE 5 ML: 10 INJECTION, SOLUTION INFILTRATION; PERINEURAL at 11:06

## 2025-02-11 NOTE — PROGRESS NOTES
Follow up right knee primary osteoarthritis.  Last injection July 2024.  Range of motion 0-120.    She  desires injection today of right knee(s).  Risks, benefits, potential complications and alternatives were discussed.   With the patient's consent, sterile prep was performed of right knee(s).  Right knee was injected with Depo Medrol 80 mg and lidocaine at anteromedial site.  Return to clinic as needed.

## 2025-02-11 NOTE — PROGRESS NOTES
Large Joint Injection/Arthocentesis: R knee joint    Date/Time: 2/11/2025 11:06 AM    Performed by: Naman Choe MD  Authorized by: Naman Choe MD    Indications:  Pain  Needle Size:  22 G  Guidance: landmark guided    Location:  Knee      Medications:  5 mL lidocaine 1 %; 80 mg methylPREDNISolone 80 MG/ML  Outcome:  Tolerated well, no immediate complications  Procedure discussed: discussed risks, benefits, and alternatives    Consent Given by:  Patient  Timeout: timeout called immediately prior to procedure    Prep: patient was prepped and draped in usual sterile fashion

## 2025-02-11 NOTE — LETTER
2/11/2025      Mya Hui  [address]  [facility] [address]      Dear Colleague,    Thank you for referring your patient, Mya Hui, to the [facility]. Please see a copy of my visit note below.    Large Joint Injection/Arthocentesis: R knee joint    Date/Time: 2/11/2025 11:06 AM    Performed by: Naman Choe MD  Authorized by: Naman Choe MD    Indications:  Pain  Needle Size:  22 G  Guidance: landmark guided    Location:  Knee      Medications:  5 mL lidocaine 1 %; 80 mg methylPREDNISolone 80 MG/ML  Outcome:  Tolerated well, no immediate complications  Procedure discussed: discussed risks, benefits, and alternatives    Consent Given by:  Patient  Timeout: timeout called immediately prior to procedure    Prep: patient was prepped and draped in usual sterile fashion          Follow up right knee primary osteoarthritis.  Last injection July 2024.  Range of motion 0-120.    She  desires injection today of right knee(s).  Risks, benefits, potential complications and alternatives were discussed.   With the patient's consent, sterile prep was performed of right knee(s).  Right knee was injected with Depo Medrol 80 mg and lidocaine at anteromedial site.  Return to clinic as needed.          Again, thank you for allowing me to participate in the care of your patient.        Sincerely,        Naman Choe MD    Electronically signed

## 2025-02-11 NOTE — TELEPHONE ENCOUNTER
----- Message from Kisha Stinson sent at 2/10/2025  2:37 PM CST -----  Please call patient with the following message    Labs looked good. Cholesterol up only minimally, so can stay off the statin medication. Kidney function improved. Thyroid TSH is up a little but since T4 is normal we will leave medication dose alone.     Carlos Stinson PA-C

## 2025-02-12 ENCOUNTER — TELEPHONE (OUTPATIENT)
Dept: NEPHROLOGY | Facility: CLINIC | Age: 85
End: 2025-02-12

## 2025-02-12 ENCOUNTER — OFFICE VISIT (OUTPATIENT)
Dept: PODIATRY | Facility: CLINIC | Age: 85
End: 2025-02-12
Payer: COMMERCIAL

## 2025-02-12 VITALS — HEIGHT: 63 IN | BODY MASS INDEX: 38.45 KG/M2 | WEIGHT: 217 LBS

## 2025-02-12 DIAGNOSIS — M76.62 LEFT ACHILLES TENDINITIS: ICD-10-CM

## 2025-02-12 DIAGNOSIS — M77.32 CALCANEAL SPUR OF LEFT FOOT: Primary | ICD-10-CM

## 2025-02-12 PROCEDURE — 99203 OFFICE O/P NEW LOW 30 MIN: CPT | Performed by: PODIATRIST

## 2025-02-12 ASSESSMENT — PAIN SCALES - GENERAL: PAINLEVEL_OUTOF10: MODERATE PAIN (5)

## 2025-02-12 NOTE — PATIENT INSTRUCTIONS
Back of left heel has a spur and needs to be softer or accommodate the small bump and the insertion of achilles and heel.   Reliable shoe stores: To maximize your experience and provide the best possible fit.  Be sure to show them your foot concerns and tell them Dr. Cobb sent you.      Stores listed in bold have only athletic shoes, and stores that are not bold are mostly casual or variety of shoes    [employer]  [address] [place] [address]  [phone]     [employer] [place]  [address] [place] [address]  [phone]     [employer] [place]  [address] [place] [address]  [phone]    [employer]  [address] [facility] [address]  [phone]    [employer]  [address] [place] [address]  [phone]    [employer]  [address] [place] [address]  [phone]                         [employer] Locations:     [address] [place] [address]   [phone]     [address] [place] [address] [address] [place] [address]   [phone]     [address] [place] [address]   [phone]     [address] [facility] [place] [address]   [phone]     [address] [facility] [place] [address]   [phone]     [address] [place] [address]   [phone]     [address]   [place] [address]   [phone]     [address] [facility]. [address]   [place] [address]   [phone]             How to find reasonable shoes          The correct width    Correct Fitting    Correct Length      Foot Distortion    Posture Distortion                          Torsional Rigidity      Grasp behind the heel and underneath the foot and twist      Bad    Excessive torsion/twist in midfoot     Less torsion/twist in midfoot is better                   Heel Counter Rigidity      Grasp just above   midsole and squeeze      Bad    Soft heel counter      Good    Rigid Heel Counter       Flexion Rigidity      Grasp shoe and bend from forefoot to rearfoot

## 2025-02-12 NOTE — TELEPHONE ENCOUNTER
[facility] Call Center    Phone Message    May a detailed message be left on voicemail: Yes    Reason for Call: Other: Neena paiz nurse  with [facility] is following up to see what patients GFR was in December. Please call Neena back to discuss.     Action Taken: Other: [facility]    Travel Screening: Not Applicable     Date of Service:

## 2025-02-12 NOTE — PROGRESS NOTES
HPI:  Mya Hui is a 84 year old female who is seen in consultation at the request of Renee Stinson PA-C    Pt presents for eval of:   (Onset, Location, L/R, Character, Treatments, Injury if yes)    XR Left foot 2/10/2025    DM type 2     Onset early Jan 2025, posterior Left heel pain. No injury noted.   Left ankle surgery 10+ years ago.  Accompanied by her Grandson, Mk 2/12/2025    Retired.    ROS:  10 point ROS neg other than the symptoms noted above in the HPI.    Patient Active Problem List   Diagnosis    Arthritis    Nevus    Asymptomatic carotid artery stenosis    Hyperlipidemia with target LDL less than 100    History of renal calculi    Sciatica of right side    Hip pain    Hearing aid worn    Osteoarthritis of hip    DDD (degenerative disc disease), lumbar    OAB (overactive bladder)    Essential hypertension with goal blood pressure less than 140/90    Hypothyroidism, unspecified type    Insomnia, unspecified    Type 2 diabetes mellitus without complication, without long-term current use of insulin (H)    Cataract, bilateral    Primary osteoarthritis involving multiple joints    Chronic right shoulder pain    Right shoulder pain, unspecified chronicity    Dry mouth    Rotator cuff arthropathy, right    Sliding hiatal hernia    Calculus of gallbladder without cholecystitis    S/P laparoscopic cholecystectomy    Toe anomaly    Cherry angioma    Urinary incontinence, unspecified type    Bilateral lower extremity edema    Diabetes mellitus with peripheral vascular disease (H)    Stage 3b chronic kidney disease (H)    Chronic venous insufficiency    Impacted cerumen of right ear    Balance problems    Tinnitus, right    Migraine without aura and without status migrainosus, not intractable    Gastroesophageal reflux disease without esophagitis    Essential hypertension    Hyperlipidemia    Diabetes mellitus with stage 3 chronic kidney disease (H)    Chronic right-sided low back pain     Urge incontinence of urine    S/P lumbar spinal fusion    Acute kidney failure with tubular necrosis (H)    Encephalopathy    Acute low back pain    Acute lower UTI    Normocytic anemia    Acute right-sided low back pain without sciatica    Closed compression fracture of body of L1 vertebra (H)    Chronic pain of right knee    Class 2 severe obesity due to excess calories with serious comorbidity and body mass index (BMI) of 38.0 to 38.9 in adult (H)    Primary osteoarthritis of right knee       PAST MEDICAL HISTORY:   Past Medical History:   Diagnosis Date    Acute cholecystitis 3/23/2018    Acute kidney injury 9/10/2017    Arthritis     Dehydration 9/4/2017    Diabetes type 2, controlled (H)     Elevated lactic acid level 9/10/2017    GERD (gastroesophageal reflux disease)     History of renal calculi     HTN, goal below 140/90     Hyperlipidaemia LDL goal < 100     Hypokalemia 9/3/2017    Hypothyroid     Insomnia     Left carotid stenosis     Migraine     Motion sickness     PONV (postoperative nausea and vomiting)     Sciatica of right side 6/28/2013    Type 2 diabetes mellitus without complication, without long-term current use of insulin (H) 2/16/2017        PAST SURGICAL HISTORY:   Past Surgical History:   Procedure Laterality Date    BUNIONECTOMY      Right foot    CATARACT IOL, RT/LT Bilateral 2017    COLONOSCOPY  07/12/2013    Procedure: COLONOSCOPY;  Colonoscopy;  Surgeon: Giovany Espinoza MD;  Location: [facility]    COLONOSCOPY N/A 01/31/2019    Procedure: Combined Colonoscopy, Single Or Multiple Biopsy/Polypectomy By Biopsy;  Surgeon: Efren Osorio MD;  Location: [facility]    COLONOSCOPY WITH CO2 INSUFFLATION N/A 01/31/2019    Procedure: COLONOSCOPY WITH CO2 INSUFFLATION;  Surgeon: Efren Osorio MD;  Location: [facility]    FRACTURE TX, ANKLE RT/LT      Left ankle    FUSION, SPINE, LUMBAR, 3 OR MORE LEVELS, POSTERIOR APPROACH, ROBOTIC-ASSISTED N/A 10/25/2023    Procedure: Thoracic  11 to pelvis robotic decompression and fusion;  Surgeon: Mumtaz Waite MD;  Location: [facility]    HC CYSTOURETHROSCOPY W/ URETEROSCOPY &/OR PYELOSCOPY; W/ LITHOTRIPSY      HC REVISE MEDIAN N/CARPAL TUNNEL SURG      Right wrist    INJECT EPIDURAL LUMBAR Right 12/27/2018    Procedure: INJECT EPIDURAL LUMBAR right foraminal narrowing severe at L4-L5 and moderate at L5-S1;  Surgeon: Gilbert Mcclure MD;  Location: [facility]    INJECT EPIDURAL TRANSFORAMINAL Bilateral 05/27/2021    Procedure: Bilateral Lumbar 5-sacral 1 Epidural steroid injection;  Surgeon: Gilbert Mcclure MD;  Location: [facility]    INJECT EPIDURAL TRANSFORAMINAL Right 06/17/2022    Procedure: Attempted right Lumbar 4-5 and Lumbar 5-Sacral 1 epidural injections with completion of only the right Lumbar 5-Sacral1 Transforaminal Epidural Steroid Injection with fluoroscopic guidance and contrast;  Surgeon: Gilbert Mcclure MD;  Location: [facility]    INJECT EPIDURAL TRANSFORAMINAL Right 07/13/2023    Procedure: Lumbar 3-4 interlaminar epidural steroid injection using fluoroscopic guidance with contrast dye, Right;  Surgeon: Gilbert Mcclure MD;  [facility]    INJECT JOINT SACROILIAC  04/10/2014    Procedure: INJECT JOINT SACROILIAC;  Sacroiliac Joint Injection;  Surgeon: Gilbert Mcclure MD;  [facility]    INJECT JOINT SACROILIAC Left 04/11/2019    Procedure: INJECT JOINT SACROILIAC- LEFT;  Surgeon: Gilbert Mcclure MD;  [facility]    INJECT JOINT SACROILIAC Right 03/03/2023    Procedure: Fluoroscopically-guided injection into the Right Sacroiliac joint;  Surgeon: Gilbert Mcclure MD;  [facility]    IR CVC TUNNEL PLACEMENT > 5 YRS OF AGE  10/30/2023    IR CVC TUNNEL REMOVAL RIGHT  11/24/2023    LAPAROSCOPIC CHOLECYSTECTOMY N/A 03/24/2018    Procedure: LAPAROSCOPIC CHOLECYSTECTOMY;  Laparoscopic Cholecystectomy;  Surgeon: Berry Allen DO;  Location: [facility]        MEDICATIONS:   Current Outpatient Medications:     cholestyramine (QUESTRAN) 4 g  packet, Take 1 packet (4 g) by mouth 2 times daily (with meals)., Disp: 180 packet, Rfl: 3    diltiazem ER COATED BEADS (CARDIZEM CD/CARTIA XT) 180 MG 24 hr capsule, Take 1 capsule (180 mg) by mouth daily., Disp: 90 capsule, Rfl: 3    ferrous sulfate (FEROSUL) 325 (65 Fe) MG tablet, Take 1 tablet (325 mg) by mouth daily (with breakfast)., Disp: 90 tablet, Rfl: 3    Incontinence Supply Disposable (PROTECTIVE UNDERWEAR SUPER LG) MISC, 3 each daily, Disp: 90 each, Rfl: 11    levothyroxine (SYNTHROID/LEVOTHROID) 100 MCG tablet, Take 1 tablet (100 mcg) by mouth daily., Disp: 90 tablet, Rfl: 3    nystatin (MYCOSTATIN) 661223 UNIT/GM external powder, Apply topically 3 times daily as needed for other (rash/skin irritation), Disp: 60 g, Rfl: 5    oxyBUTYnin ER (DITROPAN XL) 10 MG 24 hr tablet, Take 1 tablet (10 mg) by mouth at bedtime., Disp: 90 tablet, Rfl: 3    [START ON 2/25/2025] oxyCODONE (ROXICODONE) 5 MG tablet, Take 1 tablet (5 mg) by mouth every 6 hours as needed for pain., Disp: 90 tablet, Rfl: 0    pantoprazole (PROTONIX) 40 MG EC tablet, Take 1 tablet (40 mg) by mouth daily., Disp: 90 tablet, Rfl: 3    zinc oxide (DESITIN) 40 % external ointment, Apply topically as needed for irritation or skin protection., Disp: 113 g, Rfl: 0     ALLERGIES:    Allergies   Allergen Reactions    Fentanyl Nausea and Vomiting     VERY sensitive to most narcotics if not all.    Morphine And Codeine Nausea        SOCIAL HISTORY:   Social History     Socioeconomic History    Marital status:      Spouse name: Not on file    Number of children: Not on file    Years of education: Not on file    Highest education level: Not on file   Occupational History     Employer: RETIRED     Comment: 's office part time   Tobacco Use    Smoking status: Never     Passive exposure: Never    Smokeless tobacco: Never   Vaping Use    Vaping status: Never Used   Substance and Sexual Activity    Alcohol use: Yes    Drug use: No    Sexual  activity: Not Currently   Other Topics Concern    Parent/sibling w/ CABG, MI or angioplasty before 65F 55M? Not Asked   Social History Narrative    Not on file     Social Drivers of Health     Financial Resource Strain: Low Risk  (2/10/2025)    Financial Resource Strain     Within the past 12 months, have you or your family members you live with been unable to get utilities (heat, electricity) when it was really needed?: No   Food Insecurity: Low Risk  (2/10/2025)    Food Insecurity     Within the past 12 months, did you worry that your food would run out before you got money to buy more?: No     Within the past 12 months, did the food you bought just not last and you didn t have money to get more?: No   Transportation Needs: Low Risk  (2/10/2025)    Transportation Needs     Within the past 12 months, has lack of transportation kept you from medical appointments, getting your medicines, non-medical meetings or appointments, work, or from getting things that you need?: No   Physical Activity: Insufficiently Active (2/10/2025)    Exercise Vital Sign     Days of Exercise per Week: 2 days     Minutes of Exercise per Session: 10 min   Stress: No Stress Concern Present (2/10/2025)    [employer] [place] [employer] - Occupational Stress Questionnaire     Feeling of Stress : Not at all   Social Connections: Unknown (2/10/2025)    Social Connection and Isolation Panel [NHANES]     Frequency of Communication with Friends and Family: Not on file     Frequency of Social Gatherings with Friends and Family: Once a week     Attends Faith Services: Not on file     Active Member of Clubs or Organizations: Not on file     Attends Club or Organization Meetings: Not on file     Marital Status: Not on file   Interpersonal Safety: Low Risk  (2/10/2025)    Interpersonal Safety     Do you feel physically and emotionally safe where you currently live?: Yes     Within the past 12 months, have you been hit, slapped, kicked or  "otherwise physically hurt by someone?: No     Within the past 12 months, have you been humiliated or emotionally abused in other ways by your partner or ex-partner?: No   Housing Stability: Low Risk  (2/10/2025)    Housing Stability     Do you have housing? : Yes     Are you worried about losing your housing?: No        FAMILY HISTORY:   Family History   Problem Relation Age of Onset    Hypertension Brother     Cerebrovascular Disease Brother     Diabetes Father     Cardiovascular Father     Diabetes Brother         Borderline    Breast Cancer Mother     Diabetes Brother     Blood Disease Brother     Cardiovascular Brother         MI        EXAM:Vitals: Ht 1.6 m (5' 3\")   Wt 98.4 kg (217 lb)   LMP  (LMP Unknown)   BMI 38.44 kg/m    BMI= Body mass index is 38.44 kg/m .    General appearance: Patient is alert and fully cooperative with history & exam.  No sign of distress is noted during the visit.     Psychiatric: Affect is pleasant & appropriate.  Patient appears motivated to improve health.     Respiratory: Breathing is regular & unlabored while sitting.     HEENT: Hearing is intact to spoken word.  Speech is clear.  No gross evidence of visual impairment that would impact ambulation.     Vascular: DP & PT pulses are intact & regular bilaterally.  No significant edema or varicosities noted.  CFT and skin temperature is normal to both lower extremities.     Neurologic: Lower extremity sensation is intact to light touch.  No evidence of weakness or contracture in the lower extremities.  No evidence of neuropathy.    Dermatologic: Skin is intact to both lower extremities with adequate texture, turgor and tone about the integument.  No paronychia or evidence of soft tissue infection is noted.     Musculoskeletal: Patient is ambulatory without assistive device or brace.  Subtle prominence is noted about the posterior insertion of the left Achilles tendon on the calcaneus.  There is a bony prominence but it is quite " small.  Subtle discomfort at the insertion of the Achilles tendon without visible edema.  No erythema or heat.  No lacerations about the skin.  Mild generalized edema bilateral and its equal bilateral lower extremities.    Radiographs: 3 views of the left foot 2/12/2025 demonstrate a small osteophyte about the insertion of the Achilles tendon upon the calcaneus.  No acute cortical reaction noted.     ASSESSMENT:       ICD-10-CM    1. Calcaneal spur of left foot  M77.32       2. Left Achilles tendinitis  M76.62            PLAN:  Reviewed patient's chart in [facility].      2/12/2025   Obtained and interpreted radiographs  Recommended modification in shoe gear so that it is not a tight band presses against this bony prominence.  After discussion of this she admits that her other pair of shoes causes no discomfort about her feet.  She will change her shoe gear.  We discussed alternative options including physical therapy oral anti-inflammatories and surgical reconstruction if this cannot be accommodated.      Cash Cobb DPM

## 2025-02-12 NOTE — LETTER
2/12/2025      Mya Hui  [address]  [facility] [address]      Dear Colleague,    Thank you for referring your patient, Mya Hui, to the [facility]. Please see a copy of my visit note below.    HPI:  Mya Hui is a 84 year old female who is seen in consultation at the request of Renee Stinson PA-C    Pt presents for eval of:   (Onset, Location, L/R, Character, Treatments, Injury if yes)    XR Left foot 2/10/2025    DM type 2     Onset early Jan 2025, posterior Left heel pain. No injury noted.   Left ankle surgery 10+ years ago.  Accompanied by her Grandson, Mk 2/12/2025    Retired.    ROS:  10 point ROS neg other than the symptoms noted above in the HPI.    Patient Active Problem List   Diagnosis     Arthritis     Nevus     Asymptomatic carotid artery stenosis     Hyperlipidemia with target LDL less than 100     History of renal calculi     Sciatica of right side     Hip pain     Hearing aid worn     Osteoarthritis of hip     DDD (degenerative disc disease), lumbar     OAB (overactive bladder)     Essential hypertension with goal blood pressure less than 140/90     Hypothyroidism, unspecified type     Insomnia, unspecified     Type 2 diabetes mellitus without complication, without long-term current use of insulin (H)     Cataract, bilateral     Primary osteoarthritis involving multiple joints     Chronic right shoulder pain     Right shoulder pain, unspecified chronicity     Dry mouth     Rotator cuff arthropathy, right     Sliding hiatal hernia     Calculus of gallbladder without cholecystitis     S/P laparoscopic cholecystectomy     Toe anomaly     Cherry angioma     Urinary incontinence, unspecified type     Bilateral lower extremity edema     Diabetes mellitus with peripheral vascular disease (H)     Stage 3b chronic kidney disease (H)     Chronic venous insufficiency     Impacted cerumen of right ear     Balance problems      Tinnitus, right     Migraine without aura and without status migrainosus, not intractable     Gastroesophageal reflux disease without esophagitis     Essential hypertension     Hyperlipidemia     Diabetes mellitus with stage 3 chronic kidney disease (H)     Chronic right-sided low back pain     Urge incontinence of urine     S/P lumbar spinal fusion     Acute kidney failure with tubular necrosis (H)     Encephalopathy     Acute low back pain     Acute lower UTI     Normocytic anemia     Acute right-sided low back pain without sciatica     Closed compression fracture of body of L1 vertebra (H)     Chronic pain of right knee     Class 2 severe obesity due to excess calories with serious comorbidity and body mass index (BMI) of 38.0 to 38.9 in adult (H)     Primary osteoarthritis of right knee       PAST MEDICAL HISTORY:   Past Medical History:   Diagnosis Date     Acute cholecystitis 3/23/2018     Acute kidney injury 9/10/2017     Arthritis      Dehydration 9/4/2017     Diabetes type 2, controlled (H)      Elevated lactic acid level 9/10/2017     GERD (gastroesophageal reflux disease)      History of renal calculi      HTN, goal below 140/90      Hyperlipidaemia LDL goal < 100      Hypokalemia 9/3/2017     Hypothyroid      Insomnia      Left carotid stenosis      Migraine      Motion sickness      PONV (postoperative nausea and vomiting)      Sciatica of right side 6/28/2013     Type 2 diabetes mellitus without complication, without long-term current use of insulin (H) 2/16/2017        PAST SURGICAL HISTORY:   Past Surgical History:   Procedure Laterality Date     BUNIONECTOMY      Right foot     CATARACT IOL, RT/LT Bilateral 2017     COLONOSCOPY  07/12/2013    Procedure: COLONOSCOPY;  Colonoscopy;  Surgeon: Giovany Espinoza MD;  Location:  GI     COLONOSCOPY N/A 01/31/2019    Procedure: Combined Colonoscopy, Single Or Multiple Biopsy/Polypectomy By Biopsy;  Surgeon: Efren Osorio MD;  Location:  [facility] OR     COLONOSCOPY WITH CO2 INSUFFLATION N/A 01/31/2019    Procedure: COLONOSCOPY WITH CO2 INSUFFLATION;  Surgeon: Efren Osorio MD;  Location: [facility] OR     FRACTURE TX, ANKLE RT/LT      Left ankle     FUSION, SPINE, LUMBAR, 3 OR MORE LEVELS, POSTERIOR APPROACH, ROBOTIC-ASSISTED N/A 10/25/2023    Procedure: Thoracic 11 to pelvis robotic decompression and fusion;  Surgeon: Mumtaz Waite MD;  Location: [facility] OR     HC CYSTOURETHROSCOPY W/ URETEROSCOPY &/OR PYELOSCOPY; W/ LITHOTRIPSY       HC REVISE MEDIAN N/CARPAL TUNNEL SURG      Right wrist     INJECT EPIDURAL LUMBAR Right 12/27/2018    Procedure: INJECT EPIDURAL LUMBAR right foraminal narrowing severe at L4-L5 and moderate at L5-S1;  Surgeon: Gilbert Mcclure MD;  Location: [facility] OR     INJECT EPIDURAL TRANSFORAMINAL Bilateral 05/27/2021    Procedure: Bilateral Lumbar 5-sacral 1 Epidural steroid injection;  Surgeon: Gilbert Mcclure MD;  Location: [facility]     INJECT EPIDURAL TRANSFORAMINAL Right 06/17/2022    Procedure: Attempted right Lumbar 4-5 and Lumbar 5-Sacral 1 epidural injections with completion of only the right Lumbar 5-Sacral1 Transforaminal Epidural Steroid Injection with fluoroscopic guidance and contrast;  Surgeon: Gilbert Mcclure MD;  Location: [facility]     INJECT EPIDURAL TRANSFORAMINAL Right 07/13/2023    Procedure: Lumbar 3-4 interlaminar epidural steroid injection using fluoroscopic guidance with contrast dye, Right;  Surgeon: Gilbert Mcclure MD;  Location: [facility]     INJECT JOINT SACROILIAC  04/10/2014    Procedure: INJECT JOINT SACROILIAC;  Sacroiliac Joint Injection;  Surgeon: Gilbert Mcclure MD;  Location: [facility]     INJECT JOINT SACROILIAC Left 04/11/2019    Procedure: INJECT JOINT SACROILIAC- LEFT;  Surgeon: Gilbert Mcclure MD;  Location: [facility]     INJECT JOINT SACROILIAC Right 03/03/2023    Procedure: Fluoroscopically-guided injection into the Right Sacroiliac joint;  Surgeon: Gilbert Mcclure MD;  Location: [facility]     IR CVC TUNNEL  PLACEMENT > 5 YRS OF AGE  10/30/2023     IR CVC TUNNEL REMOVAL RIGHT  11/24/2023     LAPAROSCOPIC CHOLECYSTECTOMY N/A 03/24/2018    Procedure: LAPAROSCOPIC CHOLECYSTECTOMY;  Laparoscopic Cholecystectomy;  Surgeon: Berry Allen DO;  Location:  OR        MEDICATIONS:   Current Outpatient Medications:      cholestyramine (QUESTRAN) 4 g packet, Take 1 packet (4 g) by mouth 2 times daily (with meals)., Disp: 180 packet, Rfl: 3     diltiazem ER COATED BEADS (CARDIZEM CD/CARTIA XT) 180 MG 24 hr capsule, Take 1 capsule (180 mg) by mouth daily., Disp: 90 capsule, Rfl: 3     ferrous sulfate (FEROSUL) 325 (65 Fe) MG tablet, Take 1 tablet (325 mg) by mouth daily (with breakfast)., Disp: 90 tablet, Rfl: 3     Incontinence Supply Disposable (PROTECTIVE UNDERWEAR SUPER LG) MISC, 3 each daily, Disp: 90 each, Rfl: 11     levothyroxine (SYNTHROID/LEVOTHROID) 100 MCG tablet, Take 1 tablet (100 mcg) by mouth daily., Disp: 90 tablet, Rfl: 3     nystatin (MYCOSTATIN) 759901 UNIT/GM external powder, Apply topically 3 times daily as needed for other (rash/skin irritation), Disp: 60 g, Rfl: 5     oxyBUTYnin ER (DITROPAN XL) 10 MG 24 hr tablet, Take 1 tablet (10 mg) by mouth at bedtime., Disp: 90 tablet, Rfl: 3     [START ON 2/25/2025] oxyCODONE (ROXICODONE) 5 MG tablet, Take 1 tablet (5 mg) by mouth every 6 hours as needed for pain., Disp: 90 tablet, Rfl: 0     pantoprazole (PROTONIX) 40 MG EC tablet, Take 1 tablet (40 mg) by mouth daily., Disp: 90 tablet, Rfl: 3     zinc oxide (DESITIN) 40 % external ointment, Apply topically as needed for irritation or skin protection., Disp: 113 g, Rfl: 0     ALLERGIES:    Allergies   Allergen Reactions     Fentanyl Nausea and Vomiting     VERY sensitive to most narcotics if not all.     Morphine And Codeine Nausea        SOCIAL HISTORY:   Social History     Socioeconomic History     Marital status:      Spouse name: Not on file     Number of children: Not on file     Years of  education: Not on file     Highest education level: Not on file   Occupational History     Employer: RETIRED     Comment: 's office part time   Tobacco Use     Smoking status: Never     Passive exposure: Never     Smokeless tobacco: Never   Vaping Use     Vaping status: Never Used   Substance and Sexual Activity     Alcohol use: Yes     Drug use: No     Sexual activity: Not Currently   Other Topics Concern     Parent/sibling w/ CABG, MI or angioplasty before 65F 55M? Not Asked   Social History Narrative     Not on file     Social Drivers of Health     Financial Resource Strain: Low Risk  (2/10/2025)    Financial Resource Strain      Within the past 12 months, have you or your family members you live with been unable to get utilities (heat, electricity) when it was really needed?: No   Food Insecurity: Low Risk  (2/10/2025)    Food Insecurity      Within the past 12 months, did you worry that your food would run out before you got money to buy more?: No      Within the past 12 months, did the food you bought just not last and you didn t have money to get more?: No   Transportation Needs: Low Risk  (2/10/2025)    Transportation Needs      Within the past 12 months, has lack of transportation kept you from medical appointments, getting your medicines, non-medical meetings or appointments, work, or from getting things that you need?: No   Physical Activity: Insufficiently Active (2/10/2025)    Exercise Vital Sign      Days of Exercise per Week: 2 days      Minutes of Exercise per Session: 10 min   Stress: No Stress Concern Present (2/10/2025)    [place] [employer] - Occupational Stress Questionnaire      Feeling of Stress : Not at all   Social Connections: Unknown (2/10/2025)    Social Connection and Isolation Panel [NHANES]      Frequency of Communication with Friends and Family: Not on file      Frequency of Social Gatherings with Friends and Family: Once a week      Attends Restorationist  "Services: Not on file      Active Member of Clubs or Organizations: Not on file      Attends Club or Organization Meetings: Not on file      Marital Status: Not on file   Interpersonal Safety: Low Risk  (2/10/2025)    Interpersonal Safety      Do you feel physically and emotionally safe where you currently live?: Yes      Within the past 12 months, have you been hit, slapped, kicked or otherwise physically hurt by someone?: No      Within the past 12 months, have you been humiliated or emotionally abused in other ways by your partner or ex-partner?: No   Housing Stability: Low Risk  (2/10/2025)    Housing Stability      Do you have housing? : Yes      Are you worried about losing your housing?: No        FAMILY HISTORY:   Family History   Problem Relation Age of Onset     Hypertension Brother      Cerebrovascular Disease Brother      Diabetes Father      Cardiovascular Father      Diabetes Brother         Borderline     Breast Cancer Mother      Diabetes Brother      Blood Disease Brother      Cardiovascular Brother         MI        EXAM:Vitals: Ht 1.6 m (5' 3\")   Wt 98.4 kg (217 lb)   LMP  (LMP Unknown)   BMI 38.44 kg/m    BMI= Body mass index is 38.44 kg/m .    General appearance: Patient is alert and fully cooperative with history & exam.  No sign of distress is noted during the visit.     Psychiatric: Affect is pleasant & appropriate.  Patient appears motivated to improve health.     Respiratory: Breathing is regular & unlabored while sitting.     HEENT: Hearing is intact to spoken word.  Speech is clear.  No gross evidence of visual impairment that would impact ambulation.     Vascular: DP & PT pulses are intact & regular bilaterally.  No significant edema or varicosities noted.  CFT and skin temperature is normal to both lower extremities.     Neurologic: Lower extremity sensation is intact to light touch.  No evidence of weakness or contracture in the lower extremities.  No evidence of " neuropathy.    Dermatologic: Skin is intact to both lower extremities with adequate texture, turgor and tone about the integument.  No paronychia or evidence of soft tissue infection is noted.     Musculoskeletal: Patient is ambulatory without assistive device or brace.  Subtle prominence is noted about the posterior insertion of the left Achilles tendon on the calcaneus.  There is a bony prominence but it is quite small.  Subtle discomfort at the insertion of the Achilles tendon without visible edema.  No erythema or heat.  No lacerations about the skin.  Mild generalized edema bilateral and its equal bilateral lower extremities.    Radiographs: 3 views of the left foot 2/12/2025 demonstrate a small osteophyte about the insertion of the Achilles tendon upon the calcaneus.  No acute cortical reaction noted.     ASSESSMENT:       ICD-10-CM    1. Calcaneal spur of left foot  M77.32       2. Left Achilles tendinitis  M76.62            PLAN:  Reviewed patient's chart in [facility].      2/12/2025   Obtained and interpreted radiographs  Recommended modification in shoe gear so that it is not a tight band presses against this bony prominence.  After discussion of this she admits that her other pair of shoes causes no discomfort about her feet.  She will change her shoe gear.  We discussed alternative options including physical therapy oral anti-inflammatories and surgical reconstruction if this cannot be accommodated.      Cash Cobb DPM      Again, thank you for allowing me to participate in the care of your patient.        Sincerely,        Cash Cobb DPM    Electronically signed

## 2025-02-13 NOTE — TELEPHONE ENCOUNTER
Detailed message left with requested information on caller's secure voicemail. Advised return call if additional information needed.

## 2025-04-28 ENCOUNTER — TELEPHONE (OUTPATIENT)
Dept: FAMILY MEDICINE | Facility: OTHER | Age: 85
End: 2025-04-28
Payer: COMMERCIAL

## 2025-04-28 NOTE — TELEPHONE ENCOUNTER
Called patient and offered appointment in RG with KP.  Patient declined and will keep appointment 4/29 with LW.

## 2025-04-28 NOTE — TELEPHONE ENCOUNTER
Reason for Call:  Appointment Request    Patient requesting this type of appt:  Lower back pain     Requested provider: No Ref-Primary, Physician or any provider at this location     Reason patient unable to be scheduled: Not within requested timeframe    When does patient want to be seen/preferred time: Same day    Comments:     Okay to leave a detailed message?: Yes at Cell number on file:    Telephone Information:   Mobile [phone]       Call taken on 4/28/2025 at 9:46 AM by Ciara Chavarria

## 2025-04-29 ENCOUNTER — OFFICE VISIT (OUTPATIENT)
Dept: FAMILY MEDICINE | Facility: OTHER | Age: 85
End: 2025-04-29
Payer: COMMERCIAL

## 2025-04-29 VITALS
SYSTOLIC BLOOD PRESSURE: 160 MMHG | HEIGHT: 63 IN | RESPIRATION RATE: 20 BRPM | WEIGHT: 221.5 LBS | TEMPERATURE: 98.2 F | DIASTOLIC BLOOD PRESSURE: 80 MMHG | OXYGEN SATURATION: 95 % | BODY MASS INDEX: 39.25 KG/M2 | HEART RATE: 68 BPM

## 2025-04-29 DIAGNOSIS — M51.362 DEGENERATION OF INTERVERTEBRAL DISC OF LUMBAR REGION WITH DISCOGENIC BACK PAIN AND LOWER EXTREMITY PAIN: ICD-10-CM

## 2025-04-29 DIAGNOSIS — Z98.1 S/P LUMBAR SPINAL FUSION: Primary | ICD-10-CM

## 2025-04-29 DIAGNOSIS — N18.32 CKD STAGE 3B, GFR 30-44 ML/MIN (H): ICD-10-CM

## 2025-04-29 DIAGNOSIS — I10 ESSENTIAL HYPERTENSION WITH GOAL BLOOD PRESSURE LESS THAN 140/90: ICD-10-CM

## 2025-04-29 DIAGNOSIS — M79.89 SWELLING OF BOTH LOWER EXTREMITIES: ICD-10-CM

## 2025-04-29 DIAGNOSIS — N32.81 OAB (OVERACTIVE BLADDER): ICD-10-CM

## 2025-04-29 PROCEDURE — G2211 COMPLEX E/M VISIT ADD ON: HCPCS

## 2025-04-29 PROCEDURE — 1125F AMNT PAIN NOTED PAIN PRSNT: CPT

## 2025-04-29 PROCEDURE — 99215 OFFICE O/P EST HI 40 MIN: CPT

## 2025-04-29 PROCEDURE — 3077F SYST BP >= 140 MM HG: CPT

## 2025-04-29 PROCEDURE — 3079F DIAST BP 80-89 MM HG: CPT

## 2025-04-29 RX ORDER — FUROSEMIDE 20 MG/1
20 TABLET ORAL DAILY
Qty: 7 TABLET | Refills: 0 | Status: SHIPPED | OUTPATIENT
Start: 2025-04-29

## 2025-04-29 RX ORDER — LIDOCAINE 50 MG/G
1 PATCH TOPICAL EVERY 24 HOURS
Qty: 30 PATCH | Refills: 0 | Status: SHIPPED | OUTPATIENT
Start: 2025-04-29 | End: 2025-05-29

## 2025-04-29 RX ORDER — METHOCARBAMOL 500 MG/1
500 TABLET, FILM COATED ORAL 3 TIMES DAILY PRN
Qty: 30 TABLET | Refills: 0 | Status: SHIPPED | OUTPATIENT
Start: 2025-04-29

## 2025-04-29 ASSESSMENT — ENCOUNTER SYMPTOMS: BACK PAIN: 1

## 2025-04-29 ASSESSMENT — PAIN SCALES - GENERAL: PAINLEVEL_OUTOF10: MODERATE PAIN (6)

## 2025-04-29 NOTE — PROGRESS NOTES
Assessment & Plan     S/P lumbar spinal fusion  Degeneration of intervertebral disc of lumbar region with discogenic back pain and lower extremity pain  Patient is a complicated 85 year-old female presenting with concerns of acute on chronic right lumbar back pain. Suspect muscular etiology at this time given HPI and absence of radicular symptoms. Recommend conservative management including as needed acetaminophen, ice/heat, gentle stretches, and topical analgesics. Prescribed PRN methocarbamol for further symptoms relief, this muscle relaxer was choose over Flexeril or Zanaflex due to less anti-cholinergic side effects. Patient requested prescription for 5% lidocaine patches. Recommend referral to physical therapy for further evaluation given acute on chronic nature. Follow-up with PCP and/or neurosurgery if no improvement despite above interventions.   - methocarbamol (ROBAXIN) 500 MG tablet; Take 1 tablet (500 mg) by mouth 3 times daily as needed for muscle spasms.  - Physical Therapy  Referral; Future  - lidocaine (LIDODERM) 5 % patch; Place 1 patch over 12 hours onto the skin every 24 hours. To prevent lidocaine toxicity, patient should be patch free for 12 hrs daily.  - Physical Therapy  Referral; Future    Essential hypertension with goal blood pressure less than 140/90  Elevated during encounter. Due for follow-up with nephrology, advised to schedule. Taking diltiazem as prescribed.     CKD stage 3b, GFR 30-44 ml/min (H)  Due for follow-up with nephrology. Last kidney function test in February.     OAB (overactive bladder)  Not finding relief improvement with oxybutynin. Reviewed urology note from February 2024. Has never been seen by pelvic flood physical therapy, open to evaluation. Referral placed. After pelvic floor PT would recommend repeat evaluation by urology. Disinclined to increase oxybutynin due to increase risk for anti-cholinergic side effects with higher does.   - Physical  Therapy  Referral; Future  - Adult Urology  Referral; Future    Swelling of both lower extremities  Suspect secondary to venus insufficiency. Prescribed 7 day course of furosemide for treatment. Would not recommend long term use due to CKD. Last renal function and electrolytes stable. Appears euvolemic, no abdominal distention, JVD, exertional dyspnea, or exertional chest pain.   - furosemide (LASIX) 20 MG tablet; Take 1 tablet (20 mg) by mouth daily.      I spent a total of 53 minutes on the day of the visit.   Time spent by me today doing chart review, history and exam, documentation and further activities per the note    Subjective   Mya is a 85 year old, presenting for the following health issues:  Back Pain      4/29/2025     4:46 PM   Additional Questions   Roomed by Susan MCKENNA   Accompanied by Yvonne Crowell     History of Present Illness       Reason for visit:  Back pain   She is taking medications regularly.        Pain History:  When did you first notice your pain? 2 and half weeks    Have you seen anyone else for your pain? No  How has your pain affected your ability to work? Not applicable  Where in your body do you have pain? Back Pain  Onset/Duration: 2.5 weeks ago   Description:   Location of pain: low back right  Character of pain: sharp and stabbing  Pain radiation: none  New numbness or weakness in legs, not attributed to pain: no   Intensity: Currently 6/10  Progression of Symptoms: worsening  History:   Specific cause: working out used a new machine unsure if pulled muscle   Pain interferes with job: No  History of back problems: previous spinal surgery - 2003 unsure what type of surgery on the back   Any previous MRI or X-rays: None  Sees a specialist for back pain: Yes, Dr. Gonzalez, no contact or appointment made at this time  Alleviating factors:   Improved by: cold and heat  help for short time   Precipitating factors:  Worsened by: Lifting, Bending, Standing, and  "Walking  Therapies tried and outcome: acetaminophen (Tylenol), cold, and heat    Presents with concerns of acute on chronic lumbar back pain for the past 2 weeks. Reports using a new piece of exercise equipment in her apartment building that uses arms and legs to pedal. Has been using exercise equipment prior to pain onset. Treating pain with ice/heat, acetaminophen, and PRN oxycodone. Has been taking oxycodone nearly nightly for pain. Pain is described as sharp and worse with activity. No pain with sitting or when laying flat. Pain does not radiate in to lower extremities.     Denies numbness, tingling, diminished sensation, or temperature discrepancy in bilateral distal lower extremities.     Denies red flag symptoms of cauda equina syndrome including loss of bowel/bladder control or saddle paresthesia.     Ongoing concerns with overactive bladder. Waking up every 1-2 hours at night to void. If she attempts to hold her bladder she experiences incontinence. Has seen urology for symptoms in the past, last appointment February 2024. Taking oxybutynin as prescribed, has not noticed much improvement with use. Has not attempted pelvic floor physical therapy.     Lower leg swelling has been an ongoing concerns, however, finds that swelling is worse over the past few weeks. Unable to wear compression stockings due to degree of swelling. Does not notice swelling is improved following sleep.         Objective    BP (!) 160/80   Pulse 68   Temp 98.2  F (36.8  C) (Temporal)   Resp 20   Ht 1.6 m (5' 2.99\")   Wt 100.5 kg (221 lb 8 oz)   LMP  (LMP Unknown)   SpO2 95%   BMI 39.25 kg/m    Body mass index is 39.25 kg/m .  Physical Exam  Vitals reviewed.   Constitutional:       General: She is not in acute distress.     Appearance: Normal appearance. She is not ill-appearing.   Neck:      Vascular: No JVD.   Cardiovascular:      Rate and Rhythm: Normal rate and regular rhythm.      Pulses:           Posterior tibial pulses " are 1+ on the right side and 1+ on the left side.      Heart sounds: Normal heart sounds, S1 normal and S2 normal. No murmur heard.  Pulmonary:      Effort: Pulmonary effort is normal. No respiratory distress.      Breath sounds: Normal breath sounds. No wheezing, rhonchi or rales.   Musculoskeletal:      Right lower le+ Edema present.      Left lower le+ Edema present.   Lymphadenopathy:      Cervical: No cervical adenopathy.   Skin:     General: Skin is warm and dry.      Capillary Refill: Capillary refill takes less than 2 seconds.      Findings: No lesion or rash.   Neurological:      Mental Status: She is alert.      Sensory: Sensation is intact. No sensory deficit.      Motor: Motor function is intact. No weakness.   Psychiatric:         Attention and Perception: Attention and perception normal.         Mood and Affect: Mood and affect normal.        Office Visit on 02/10/2025   Component Date Value Ref Range Status    Hemoglobin 02/10/2025 14.0  11.7 - 15.7 g/dL Final    Iron 02/10/2025 97  37 - 145 ug/dL Final    Iron Binding Capacity 02/10/2025 297  240 - 430 ug/dL Final    Iron Sat Index 02/10/2025 33  15 - 46 % Final    Ferritin 02/10/2025 151  11 - 328 ng/mL Final    Parathyroid Hormone Intact 02/10/2025 70 (H)  15 - 65 pg/mL Final    Vitamin D, Total (25-Hydroxy) 02/10/2025 10 (L)  20 - 50 ng/mL Final    mild to moderate deficiency    Estimated Average Glucose 02/10/2025 146 (H)  <117 mg/dL Final    Hemoglobin A1C 02/10/2025 6.7 (H)  0.0 - 5.6 % Final    Normal <5.7%   Prediabetes 5.7-6.4%    Diabetes 6.5% or higher     Note: Adopted from ADA consensus guidelines.    Cholesterol 02/10/2025 189  <200 mg/dL Final    Triglycerides 02/10/2025 301 (H)  <150 mg/dL Final    Direct Measure HDL 02/10/2025 43 (L)  >=50 mg/dL Final    LDL Cholesterol Calculated 02/10/2025 86  <100 mg/dL Final    Non HDL Cholesterol 02/10/2025 146 (H)  <130 mg/dL Final    Patient Fasting > 8hrs? 02/10/2025 No   Final     Creatinine Urine mg/dL 02/10/2025 240.0  mg/dL Final    The reference ranges have not been established in urine creatinine. The results should be integrated into the clinical context for interpretation.    Albumin Urine mg/L 02/10/2025 248.0  mg/L Final    The reference ranges have not been established in urine albumin. The results should be integrated into the clinical context for interpretation.    Albumin Urine mg/g Cr 02/10/2025 103.33 (H)  0.00 - 25.00 mg/g Cr Final    Microalbuminuria is defined as an albumin:creatinine ratio of 17 to 299 for males and 25 to 299 for females. A ratio of albumin:creatinine of 300 or higher is indicative of overt proteinuria.  Due to biologic variability, positive results should be confirmed by a second, first-morning random or 24-hour timed urine specimen. If there is discrepancy, a third specimen is recommended. When 2 out of 3 results are in the microalbuminuria range, this is evidence for incipient nephropathy and warrants increased efforts at glucose control, blood pressure control, and institution of therapy with an angiotensin-converting-enzyme (ACE) inhibitor (if the patient can tolerate it).      TSH 02/10/2025 4.32 (H)  0.30 - 4.20 uIU/mL Final    Sodium 02/10/2025 143  135 - 145 mmol/L Final    Potassium 02/10/2025 3.5  3.4 - 5.3 mmol/L Final    Carbon Dioxide (CO2) 02/10/2025 25  22 - 29 mmol/L Final    Anion Gap 02/10/2025 12  7 - 15 mmol/L Final    Urea Nitrogen 02/10/2025 13.7  8.0 - 23.0 mg/dL Final    Creatinine 02/10/2025 1.00 (H)  0.51 - 0.95 mg/dL Final    GFR Estimate 02/10/2025 56 (L)  >60 mL/min/1.73m2 Final    eGFR calculated using 2021 CKD-EPI equation.    Calcium 02/10/2025 9.4  8.8 - 10.4 mg/dL Final    Chloride 02/10/2025 106  98 - 107 mmol/L Final    Glucose 02/10/2025 139 (H)  70 - 99 mg/dL Final    Alkaline Phosphatase 02/10/2025 98  40 - 150 U/L Final    AST 02/10/2025 28  0 - 45 U/L Final    ALT 02/10/2025 29  0 - 50 U/L Final    Protein Total  02/10/2025 7.0  6.4 - 8.3 g/dL Final    Albumin 02/10/2025 4.4  3.5 - 5.2 g/dL Final    Bilirubin Total 02/10/2025 0.4  <=1.2 mg/dL Final    Patient Fasting > 8hrs? 02/10/2025 No   Final    Phosphorus 02/10/2025 3.1  2.5 - 4.5 mg/dL Final    Free T4 02/10/2025 1.22  0.90 - 1.70 ng/dL Final           Signed Electronically by: MAYI Tejada CNP

## 2025-04-29 NOTE — PATIENT INSTRUCTIONS
I suspect your back pain is related to muscle strain and over use. I would recommend continuing with the acetaminophen (Tylenol) and as needed oxycodone for back pain. I have prescribed a muscle relaxer called methocarbamol to take as needed for muscle pain. You can take as needed up to 3 times per day. This medication can make you feel sleepy so do not drive while taking. I would also recommend applying lidocaine to area to further aid in pain relief. I think it would be beneficial to see physical therapy for back pain.     Based on previous note from urologist, I would recommend seeing the pelvic floor physical therapist for further management of overactive bladder symptoms. After seeing pelvic floor physical therapy, follow-up with urology would be recommended. Referrals have been placed.     For the leg swelling, I have prescribed a 7 day course of daily furosemide (Lasix) to help pull off fluid to aid in reducing swelling in lower legs. I do not want you taking this medication for a long time due to risk for worsening kidney disease. When swelling goes down in the lower legs please wear compression stockings daily and elevate legs as much as possible. If no improvement with the water pill, I would recommended follow-up for other causes for swelling.

## 2025-05-05 ENCOUNTER — TELEPHONE (OUTPATIENT)
Dept: FAMILY MEDICINE | Facility: OTHER | Age: 85
End: 2025-05-05
Payer: COMMERCIAL

## 2025-05-05 NOTE — TELEPHONE ENCOUNTER
RN called patient to relay message from provider below:        Patient verbalized understanding, no further questions at this time. She said she will discuss this with her daughter.     No further action needed at this time. Will close this encounter.    Raegan Ferreira RN on 5/5/2025 at 12:16 PM

## 2025-05-05 NOTE — TELEPHONE ENCOUNTER
Patient is calling and stated she had seen Rosa Pack CNP on Tuesday, 4/29/2025 of last week for back pain.  She stated she was prescribed a muscle relaxer (methocarbamol) and a patch to place on her back.  Patient verbalized neither of those recommendations/ prescriptions have helped with her back pain.  She stated if she is sitting down and still, she does not feel the pain, but trying to move around or walk around the pain is the same as when she was into the clinic last week.  Patient stated no new or worsening symptoms, but would like to know if she should be trying something else for the pain.    Patient is requesting a message to be sent to Rosa Pack CNP for review.    Patient stated she would like a call back.    Julieta Mckinley RN

## 2025-05-06 ENCOUNTER — TELEPHONE (OUTPATIENT)
Dept: NEPHROLOGY | Facility: CLINIC | Age: 85
End: 2025-05-06
Payer: COMMERCIAL

## 2025-05-06 ENCOUNTER — TELEPHONE (OUTPATIENT)
Dept: FAMILY MEDICINE | Facility: OTHER | Age: 85
End: 2025-05-06
Payer: COMMERCIAL

## 2025-05-06 DIAGNOSIS — M51.362 DEGENERATION OF INTERVERTEBRAL DISC OF LUMBAR REGION WITH DISCOGENIC BACK PAIN AND LOWER EXTREMITY PAIN: ICD-10-CM

## 2025-05-06 DIAGNOSIS — Z98.1 S/P LUMBAR SPINAL FUSION: Primary | ICD-10-CM

## 2025-05-06 NOTE — TELEPHONE ENCOUNTER
Patient's daughter April (C2C is on file) is calling in with second update.  She reports that she is working on trying to get home care Physical Therapy for patient.  She is wondering if provider would be willing to prescribe Prednisone for patient? Patient was seen in ED 4/17/24 for the same type of pain, said Prednisone was incredibly helpful.  Patient does not feel pain is significantly worse but definitely not any better and starting to worsen. She does not want the pain to get worse for patient.  RN advised that can send message to provider that saw patient, may need to go to PCP, but again no guarantee that PCP would prescribe without seeing patient herself.  Daughter verbalizes understanding but is asking for message to be sent please.  She will await a call back.

## 2025-05-06 NOTE — TELEPHONE ENCOUNTER
[facility] Call Center    Phone Message    May a detailed message be left on voicemail: yes     Reason for Call: Other: Pt's daughter April said her pt can't wait until November to be seen. April wants another provider to be seen  by her for sooner appointment. Please call April back  . Thanks.    Action Taken:      NEPHRO       Travel Screening: Not Applicable     Date of Service:

## 2025-05-06 NOTE — TELEPHONE ENCOUNTER
Rosa Pack APRN CNP to [facility]   LW    5/6/25  2:29 PM   Sorry, I read the ED encounter date as 4/17/24 thinking it was this year not last year. However, my recommendation to avoid oral steroids remains.    MAYI Tejada CNP

## 2025-05-06 NOTE — TELEPHONE ENCOUNTER
Call back from daughter and informed of providers message. Daughter is confused, patient was not prescribed steroids during her visit on 4/29/25, has not gotten steroids recently elsewhere.    Daughter states she got Prednisone 1 year ago for a similar issue and helped well, but has not had Prednisone or other steroids recently.     Divya ODELLN, RN

## 2025-05-06 NOTE — TELEPHONE ENCOUNTER
"Returned call and spoke with pt's Daughter \"April\" (Auth to Discuss in chart).  Offered next available in clinic   9/08/25 at 1:30pm or Virtual Visit 6/24/25 at 9:30am.   April choose Virtual Visit 6/24/25 at 9:30am.  She will be with pt at the appt and requested her phone number to be called for this visit (message placed in appt notes).   Assisted with scheduling NON fasting lab appt at [facility] 6/17/25.  April stated her Mother is being seen by her PCP, for swelling.    Encouraged to call if further questions or concerns.    Vanessa Sosa LPN    "

## 2025-05-06 NOTE — TELEPHONE ENCOUNTER
RN did call and speak with daughter. Reviewed message below.  Daughter verbalized understanding but voiced frustrations.  She said she is trying to avoid having to bring her mother back in to the ED.  She requests a message be sent to PCP asking if they could do a virtual visit to discuss prednisone for the back pain so it doesn't get so bad that she has to go in to the  ED. She requests a call back with PCP's recommendations.

## 2025-05-06 NOTE — TELEPHONE ENCOUNTER
Virtual appointment scheduled.     Appointments in Next Year      May 07, 2025 9:00 AM  (Arrive by 8:55 AM)  Provider Visit with Kisha Stinson PA-C  [facility] ([facility]) [phone]     Divya ODELLN, RN

## 2025-05-06 NOTE — TELEPHONE ENCOUNTER
RN Triage    Patient Contact    Attempt # 1    RN did attempt to reach patient's daughter. No answer. Message left for patient's daughter to call the clinic back and ask to speak to a member of the care team. Wanting to let her know that the provider is not able to prescribe prednisone as patient just had a dose prescribed on 4/18.  See note below.     Claribel Ware RN on 5/6/2025 at 2:06 PM

## 2025-05-06 NOTE — TELEPHONE ENCOUNTER
Order/Referral Request    Who is requesting: April, daughter    Orders being requested: Home care for physical therapy    Reason service is needed/diagnosis: back pain and weakness & they did recommend PT at last appt. However daughter is wanting to use a different organization outside of     When are orders needed by: asap    Has this been discussed with Provider: Yes, Rosa Pack    Does patient have a preference on a Group/Provider/Facility? [facility] in [place], [place] -  # [phone], Fax [phone]    Does patient have an appointment scheduled?: No    Where to send orders: Fax    Okay to leave a detailed message?: Yes at Other phone number:  [phone] April, daughter

## 2025-05-06 NOTE — TELEPHONE ENCOUNTER
Rosa Pack, MAYI CNP to [facility]   LW    5/6/25  1:52 PM   As she has just had a round of steroids, I will not be prescribing again. The use of recurrent oral steroids is not indicated due to systemic risk and side effects.

## 2025-05-07 ENCOUNTER — VIRTUAL VISIT (OUTPATIENT)
Dept: FAMILY MEDICINE | Facility: OTHER | Age: 85
End: 2025-05-07
Payer: COMMERCIAL

## 2025-05-07 DIAGNOSIS — N18.32 STAGE 3B CHRONIC KIDNEY DISEASE (H): ICD-10-CM

## 2025-05-07 DIAGNOSIS — M54.50 CHRONIC RIGHT-SIDED LOW BACK PAIN WITHOUT SCIATICA: Primary | ICD-10-CM

## 2025-05-07 DIAGNOSIS — G89.29 CHRONIC RIGHT-SIDED LOW BACK PAIN WITHOUT SCIATICA: Primary | ICD-10-CM

## 2025-05-07 DIAGNOSIS — M51.369 DEGENERATION OF INTERVERTEBRAL DISC OF LUMBAR REGION, UNSPECIFIED WHETHER PAIN PRESENT: ICD-10-CM

## 2025-05-07 PROCEDURE — 98006 SYNCH AUDIO-VIDEO EST MOD 30: CPT | Performed by: PHYSICIAN ASSISTANT

## 2025-05-07 PROCEDURE — 1125F AMNT PAIN NOTED PAIN PRSNT: CPT | Mod: 95 | Performed by: PHYSICIAN ASSISTANT

## 2025-05-07 RX ORDER — PREDNISONE 20 MG/1
40 TABLET ORAL EVERY MORNING
Qty: 10 TABLET | Refills: 0 | Status: SHIPPED | OUTPATIENT
Start: 2025-05-07 | End: 2025-05-12

## 2025-05-07 NOTE — PROGRESS NOTES
Mya is a 85 year old who is being evaluated via a billable video visit.    How would you like to obtain your AVS? MyChart  If the video visit is dropped, the invitation should be resent by: Text to cell phone: [phone]  Will anyone else be joining your video visit? No    Assessment & Plan     ICD-10-CM    1. Degeneration of intervertebral disc of lumbar region, unspecified whether pain present  M51.369 predniSONE (DELTASONE) 20 MG tablet      2. Stage 3b chronic kidney disease (H)  N18.32       3. Chronic right-sided low back pain without sciatica  M54.50 predniSONE (DELTASONE) 20 MG tablet    G89.29         - Patient with known back issues, s/p fusion and several injections   - 3 weeks ago had an acute flare     Was seen in clinic and given Robaxin and lidoderm patches but these have not helped     Will be starting physical therapy soon   - Had something like this about 1 year ago, prednisone 40 mg for 4-5 days was helpful    Patient is requesting this again     CKD stable, slightly improving, does have upcoming consult   - Low risk, will allow 5 day course   - Reviewed use and side effects  - Continue with plan for physical therapy   - Continue with PRN Oxycodone      Last filled #90 2/25/25,  reviewed, no concerns      OK for refill as needed       The patient indicates understanding of these issues and agrees with the plan.    Follow up: if doesn't improve     Carlos Stinson PA-C  [facility]   Mya is a 85 year old, presenting for the following health issues:  Recheck Medication      5/7/2025     8:34 AM   Additional Questions   Roomed by Lara   Accompanied by Daughter April     Video Start Time: 9:08 AM    History of Present Illness       Reason for visit:  Back pain   She is taking medications regularly.        - Right side and lower part of back   - Has had this before   - Sharp pain when hits her   - 3 weeks now, saw other provider      Worsening over the 3 weeks      Comes and goes      Doesn't go down leg   - ED for similar pain this time last year, given prednisone   - Muscle relaxer not helping at all     Patch - can't get it on right     - Seeing kidney doctor in June       Review of Systems  Constitutional, neuro, ENT, endocrine, pulmonary, cardiac, gastrointestinal, genitourinary, musculoskeletal, integument and psychiatric systems are negative, except as otherwise noted.      Objective    Vitals - Patient Reported  Pain Score: Severe Pain (8)  Pain Loc: Low Back    Physical Exam   GENERAL: alert and no distress  EYES: Eyes grossly normal to inspection.  No discharge or erythema, or obvious scleral/conjunctival abnormalities.  RESP: No audible wheeze, cough, or visible cyanosis.    SKIN: Visible skin clear. No significant rash, abnormal pigmentation or lesions.  NEURO: Cranial nerves grossly intact.  Mentation and speech appropriate for age.  PSYCH: Appropriate affect, tone, and pace of words    Diagnostics: reviewed in Epic         Video-Visit Details    Type of service:  Video Visit   Video End Time:9:15 AM  Originating Location (pt. Location): Home  Distant Location (provider location):  Off-site  Platform used for Video Visit: Abebe  Signed Electronically by: Kisha Stinson PA-C

## 2025-05-12 ENCOUNTER — TELEPHONE (OUTPATIENT)
Dept: FAMILY MEDICINE | Facility: OTHER | Age: 85
End: 2025-05-12
Payer: COMMERCIAL

## 2025-05-12 NOTE — TELEPHONE ENCOUNTER
She will need to use her PRN Oxycodone and continue physical therapy    Carlos Stinson PA-C  [facility]

## 2025-05-12 NOTE — TELEPHONE ENCOUNTER
S: Back Pain Follow Up    B: Patient is calling in with follow up on her back pain. Patient did have virtual visit with [facility] on 5/7. She was given course of Prednisone.  Patient states it did help for a little bit but now that the medication is gone the back pain is back exactly the same.  It is NOT worse, it is the same.  In the right lower back, hip area.  It does not radiate down leg, there is not n/t. When sitting pain is low 1-2/10. Anytime she starts to move or gets up and tries to walk the pain is 8/10.  She has not started physical therapy yet, home care referral faxed to [facility] on Friday 5/9. Patient wants message to be sent to PCP wanting to know what more can be done.     A: Advised patient that message will be sent to PCP to see if a follow up appointment is needed or what recommendation would be.     R: patient verbalized understanding and is agreeable. She will await a call back.

## 2025-05-12 NOTE — TELEPHONE ENCOUNTER
FYI - Status Update    Who is Calling: [facility]    Update: [facility] updating that patient is starting PT on 5/13/25.    Does caller want a call/response back: No

## 2025-05-13 ENCOUNTER — TELEPHONE (OUTPATIENT)
Dept: FAMILY MEDICINE | Facility: OTHER | Age: 85
End: 2025-05-13
Payer: COMMERCIAL

## 2025-05-13 NOTE — TELEPHONE ENCOUNTER
Forms/Letter Request    Type of form/letter: OTHER: Home Care S[facility]       Do we have the form/letter: Yes:      Who is the form from? [facility].    Where did/will the form come from? form was faxed in    When is form/letter needed by: Complete @ your earliest convenience    How would you like the form/letter returned: Fax : to:  [phone]    Patient Notified form requests are processed in 5-7 business days:N/A    Okay to leave a detailed message?: Yes at Cell number on file:    Telephone Information:   Mobile [phone]

## 2025-05-13 NOTE — TELEPHONE ENCOUNTER
Forms filled out to best of staff's ability. Placed in provider bin for completion if appropriate

## 2025-05-16 ENCOUNTER — MEDICAL CORRESPONDENCE (OUTPATIENT)
Dept: HEALTH INFORMATION MANAGEMENT | Facility: CLINIC | Age: 85
End: 2025-05-16
Payer: COMMERCIAL

## 2025-05-23 DIAGNOSIS — Z53.9 DIAGNOSIS NOT YET DEFINED: Primary | ICD-10-CM

## 2025-05-23 PROCEDURE — G0180 MD CERTIFICATION HHA PATIENT: HCPCS

## 2025-05-25 ENCOUNTER — TELEPHONE (OUTPATIENT)
Dept: FAMILY MEDICINE | Facility: OTHER | Age: 85
End: 2025-05-25
Payer: COMMERCIAL

## 2025-05-25 DIAGNOSIS — Z98.1 S/P SPINAL FUSION: ICD-10-CM

## 2025-05-25 DIAGNOSIS — M51.362 DEGENERATION OF INTERVERTEBRAL DISC OF LUMBAR REGION WITH DISCOGENIC BACK PAIN AND LOWER EXTREMITY PAIN: ICD-10-CM

## 2025-05-25 NOTE — TELEPHONE ENCOUNTER
Medication Question or Refill    Contacts       Contact Date/Time Type Contact Phone/Fax    05/25/2025 05:46 PM CDT Phone (Incoming) Mya Hui (Self) [phone] (M)            What medication are you calling about (include dose and sig)?: Oxycodone 5mg    Preferred Pharmacy:    [id number] [place], [place] - [address] [place] [address]  [address] [place] [id number]  Phone: [phone] Fax: [phone]      Controlled Substance Agreement on file:   CSA -- Patient Level:    CSA: None found at the patient level.       Who prescribed the medication?: PCP    Do you need a refill? Yes    When did you use the medication last? 05/24/25    Patient offered an appointment? No    Do you have any questions or concerns?  No      Okay to leave a detailed message?: Yes at Cell number on file:    Telephone Information:   Mobile [phone]

## 2025-05-27 RX ORDER — OXYCODONE HYDROCHLORIDE 5 MG/1
5 TABLET ORAL EVERY 6 HOURS PRN
Qty: 90 TABLET | Refills: 0 | Status: SHIPPED | OUTPATIENT
Start: 2025-05-27

## 2025-05-27 NOTE — TELEPHONE ENCOUNTER
reviewed. Due for routine fill. E-prescribed.     Carlos Stinson PA-C  [facility]

## 2025-06-03 ENCOUNTER — DOCUMENTATION ONLY (OUTPATIENT)
Dept: NEPHROLOGY | Facility: CLINIC | Age: 85
End: 2025-06-03
Payer: COMMERCIAL

## 2025-06-03 DIAGNOSIS — N18.31 STAGE 3A CHRONIC KIDNEY DISEASE (CKD) (H): Primary | ICD-10-CM

## 2025-06-03 NOTE — PROGRESS NOTES
Please place new lab orders for this patient. She has a lab appointment and the orders in her chart are  and unable to be used.    Thank You,  Rosa Cristina MLT([facility])

## 2025-06-17 ENCOUNTER — LAB (OUTPATIENT)
Dept: LAB | Facility: OTHER | Age: 85
End: 2025-06-17
Payer: COMMERCIAL

## 2025-06-17 DIAGNOSIS — N18.31 STAGE 3A CHRONIC KIDNEY DISEASE (CKD) (H): ICD-10-CM

## 2025-06-17 LAB
ALBUMIN MFR UR ELPH: 29.4 MG/DL
ALBUMIN SERPL BCG-MCNC: 4.4 G/DL (ref 3.5–5.2)
ALBUMIN UR-MCNC: 30 MG/DL
ANION GAP SERPL CALCULATED.3IONS-SCNC: 10 MMOL/L (ref 7–15)
APPEARANCE UR: CLEAR
BACTERIA #/AREA URNS HPF: ABNORMAL /HPF
BILIRUB UR QL STRIP: NEGATIVE
BUN SERPL-MCNC: 16.2 MG/DL (ref 8–23)
CALCIUM SERPL-MCNC: 9.4 MG/DL (ref 8.8–10.4)
CHLORIDE SERPL-SCNC: 106 MMOL/L (ref 98–107)
COLOR UR AUTO: YELLOW
CREAT SERPL-MCNC: 1.08 MG/DL (ref 0.51–0.95)
CREAT UR-MCNC: 118.4 MG/DL
EGFRCR SERPLBLD CKD-EPI 2021: 51 ML/MIN/1.73M2
ERYTHROCYTE [DISTWIDTH] IN BLOOD BY AUTOMATED COUNT: 12.3 % (ref 10–15)
GLUCOSE SERPL-MCNC: 155 MG/DL (ref 70–99)
GLUCOSE UR STRIP-MCNC: NEGATIVE MG/DL
HCO3 SERPL-SCNC: 28 MMOL/L (ref 22–29)
HCT VFR BLD AUTO: 43.9 % (ref 35–47)
HGB BLD-MCNC: 14.2 G/DL (ref 11.7–15.7)
HGB UR QL STRIP: ABNORMAL
KETONES UR STRIP-MCNC: NEGATIVE MG/DL
LEUKOCYTE ESTERASE UR QL STRIP: ABNORMAL
MCH RBC QN AUTO: 31.2 PG (ref 26.5–33)
MCHC RBC AUTO-ENTMCNC: 32.3 G/DL (ref 31.5–36.5)
MCV RBC AUTO: 97 FL (ref 78–100)
NITRATE UR QL: POSITIVE
PH UR STRIP: 5.5 [PH] (ref 5–7)
PHOSPHATE SERPL-MCNC: 3.1 MG/DL (ref 2.5–4.5)
PLATELET # BLD AUTO: 155 10E3/UL (ref 150–450)
POTASSIUM SERPL-SCNC: 3.6 MMOL/L (ref 3.4–5.3)
PROT/CREAT 24H UR: 0.25 MG/MG CR (ref 0–0.2)
RBC # BLD AUTO: 4.55 10E6/UL (ref 3.8–5.2)
RBC #/AREA URNS AUTO: ABNORMAL /HPF
SODIUM SERPL-SCNC: 144 MMOL/L (ref 135–145)
SP GR UR STRIP: 1.02 (ref 1–1.03)
SQUAMOUS #/AREA URNS AUTO: ABNORMAL /LPF
UROBILINOGEN UR STRIP-ACNC: 0.2 E.U./DL
WBC # BLD AUTO: 6.6 10E3/UL (ref 4–11)
WBC #/AREA URNS AUTO: ABNORMAL /HPF

## 2025-06-17 PROCEDURE — 36415 COLL VENOUS BLD VENIPUNCTURE: CPT

## 2025-06-17 PROCEDURE — 80069 RENAL FUNCTION PANEL: CPT

## 2025-06-17 PROCEDURE — 84156 ASSAY OF PROTEIN URINE: CPT

## 2025-06-17 PROCEDURE — 81001 URINALYSIS AUTO W/SCOPE: CPT

## 2025-06-17 PROCEDURE — 85027 COMPLETE CBC AUTOMATED: CPT

## 2025-06-24 ENCOUNTER — VIRTUAL VISIT (OUTPATIENT)
Dept: NEPHROLOGY | Facility: CLINIC | Age: 85
End: 2025-06-24
Payer: COMMERCIAL

## 2025-06-24 DIAGNOSIS — N25.81 SECONDARY RENAL HYPERPARATHYROIDISM: ICD-10-CM

## 2025-06-24 DIAGNOSIS — E66.01 CLASS 2 SEVERE OBESITY DUE TO EXCESS CALORIES WITH SERIOUS COMORBIDITY AND BODY MASS INDEX (BMI) OF 38.0 TO 38.9 IN ADULT (H): ICD-10-CM

## 2025-06-24 DIAGNOSIS — D64.9 ANEMIA, UNSPECIFIED TYPE: ICD-10-CM

## 2025-06-24 DIAGNOSIS — I10 BENIGN ESSENTIAL HYPERTENSION: ICD-10-CM

## 2025-06-24 DIAGNOSIS — E66.812 CLASS 2 SEVERE OBESITY DUE TO EXCESS CALORIES WITH SERIOUS COMORBIDITY AND BODY MASS INDEX (BMI) OF 38.0 TO 38.9 IN ADULT (H): ICD-10-CM

## 2025-06-24 DIAGNOSIS — N18.31 STAGE 3A CHRONIC KIDNEY DISEASE (CKD) (H): Primary | ICD-10-CM

## 2025-06-24 PROCEDURE — 98007 SYNCH AUDIO-VIDEO EST HI 40: CPT | Performed by: INTERNAL MEDICINE

## 2025-06-24 PROCEDURE — 1125F AMNT PAIN NOTED PAIN PRSNT: CPT | Mod: 95 | Performed by: INTERNAL MEDICINE

## 2025-06-24 PROCEDURE — 99417 PROLNG OP E/M EACH 15 MIN: CPT | Performed by: INTERNAL MEDICINE

## 2025-06-24 NOTE — NURSING NOTE
Current patient location: [address]   [place] [address]    Is the patient currently in the state of [place]? YES    Visit mode: VIDEO    If the visit is dropped, the patient can be reconnected by:VIDEO VISIT: Text to cell phone:   Telephone Information:   Mobile [phone]       Will anyone else be joining the visit? YES: How would they like to receive their invitation? Send to e-mail: PT will be with daughter  (If patient encounters technical issues they should call [phone]    Are changes needed to the allergy or medication list? No    Are refills needed on medications prescribed by this physician? NO    Rooming Documentation:  Questionnaire(s) completed    Reason for visit: RECHECK    Suresh MIDDLETON

## 2025-06-24 NOTE — PROGRESS NOTES
"Virtual Visit Details    Type of service:  Video Visit     Originating Location (pt. Location): Home    Distant Location (provider location):  Off-site  Platform used for Video Visit: AmWell    06/24/25     I was asked to see this patient by Fareed Roldan PA-C for evaluation of CKD following LISA event.     CC: CKD    HPI: Mya Hui is an 85 year old  female who presents for evaluation of CKD. On review of her creatinine levels:  - Baseline ~ 0.8 to 1 2011 to May 2022  - LISA episodes in 2017 and 2022  - 1.11 in October 2023  - LISA in Nov 2023 with creatinine peaking at 7.18 11/6/23.   - Improved back to 1.1-1.4 most recently    The LISA episode that occurred in Nov 2023 was during a hospitalization from 10/25-12/1/23. She had undergone T11 to pelvis robotic decompression and fusion on 10/25. This surgery was complicated by development of arrhythmia with hypotension and acute blood loss leading to hemorrhagic shock. Shock liver, LISA, encephalopathy. Her LISA required hemodialysis support until 11/17/23 being her last dialysis session.     Addt hx includes DM, chronic back pain, hypertension, hypothyroidism, hyperlipidemia,     Recently hospitalized 2/4-2.6 with acute on chronic back pain. Hx of spinal fusion October 2023.    She went to rehab following her hospitalization in Dec and she is now in her apartment January. Another compression fracture in Feb - rehab again but now back to apartment. Assisted Living with hopes of getting back to independent. Appetite is good. \"Too good\". No nausea or vomiting. Urinating without difficulty. Energy could be better but ok considering circumstances. BP - today 140s/70. It has been good per her report. PCP was ok with being the 140s for now. She was previously on the hydralazine - off of it now.     - History of Hematuria: no  - Swelling: little bit - much improved with compression stockings. Comfortable.   - Hx of UTIs: no  - Hx of stones: had a kidney stone requiring " intervention - years ago.   - Rashes/Joint pain: little under her breast, chronic back pain - improved.   - Family hx of kidney disease: no  - NSAID use: no, only tylenol    06/24/25: video visit. At this time, back pain. Not too much walking because of pain. Trouble over the weekend. Needed help getting up when in the bathroom - this has occurred a few times. Still bent over. No hospitalizations. Home PT occurring at this time. No burning or pain with urination. Swelling present in the feet/legs. Has compression socks - doesn't do much. Swelling worseed in the past few months. She took lasix 20 mg daily for one week - no changs noted. Maybe slight improvement in swelling but not noticeable. Has more pain with PT. BP this AM - 164/86.     Current Outpatient Medications   Medication Sig Dispense Refill    cholestyramine (QUESTRAN) 4 g packet Take 1 packet (4 g) by mouth 2 times daily (with meals). 180 packet 3    diltiazem ER COATED BEADS (CARDIZEM CD/CARTIA XT) 180 MG 24 hr capsule Take 1 capsule (180 mg) by mouth daily. 90 capsule 3    furosemide (LASIX) 20 MG tablet Take 1 tablet (20 mg) by mouth daily. 7 tablet 0    Incontinence Supply Disposable (PROTECTIVE UNDERWEAR SUPER LG) MISC 3 each daily 90 each 11    levothyroxine (SYNTHROID/LEVOTHROID) 100 MCG tablet Take 1 tablet (100 mcg) by mouth daily. 90 tablet 3    methocarbamol (ROBAXIN) 500 MG tablet Take 1 tablet (500 mg) by mouth 3 times daily as needed for muscle spasms. 30 tablet 0    nystatin (MYCOSTATIN) 003270 UNIT/GM external powder Apply topically 3 times daily as needed for other (rash/skin irritation) 60 g 5    oxyBUTYnin ER (DITROPAN XL) 10 MG 24 hr tablet Take 1 tablet (10 mg) by mouth at bedtime. 90 tablet 3    oxyCODONE (ROXICODONE) 5 MG tablet Take 1 tablet (5 mg) by mouth every 6 hours as needed for pain. 90 tablet 0    pantoprazole (PROTONIX) 40 MG EC tablet Take 1 tablet (40 mg) by mouth daily. 90 tablet 3    zinc oxide (DESITIN) 40 % external  ointment Apply topically as needed for irritation or skin protection. 113 g 0     No current facility-administered medications for this visit.       Exam:  LMP  (LMP Unknown)   GENERAL APPEARANCE: alert and no distress  Resp - breathing is nonlabored.     Results:    No visits with results within 1 Day(s) from this visit.   Latest known visit with results is:   Lab on 06/17/2025   Component Date Value Ref Range Status    Sodium 06/17/2025 144  135 - 145 mmol/L Final    Potassium 06/17/2025 3.6  3.4 - 5.3 mmol/L Final    Chloride 06/17/2025 106  98 - 107 mmol/L Final    Carbon Dioxide (CO2) 06/17/2025 28  22 - 29 mmol/L Final    Anion Gap 06/17/2025 10  7 - 15 mmol/L Final    Glucose 06/17/2025 155 (H)  70 - 99 mg/dL Final    Urea Nitrogen 06/17/2025 16.2  8.0 - 23.0 mg/dL Final    Creatinine 06/17/2025 1.08 (H)  0.51 - 0.95 mg/dL Final    GFR Estimate 06/17/2025 51 (L)  >60 mL/min/1.73m2 Final    eGFR calculated using 2021 CKD-EPI equation.    Calcium 06/17/2025 9.4  8.8 - 10.4 mg/dL Final    Albumin 06/17/2025 4.4  3.5 - 5.2 g/dL Final    Phosphorus 06/17/2025 3.1  2.5 - 4.5 mg/dL Final    WBC Count 06/17/2025 6.6  4.0 - 11.0 10e3/uL Final    RBC Count 06/17/2025 4.55  3.80 - 5.20 10e6/uL Final    Hemoglobin 06/17/2025 14.2  11.7 - 15.7 g/dL Final    Hematocrit 06/17/2025 43.9  35.0 - 47.0 % Final    MCV 06/17/2025 97  78 - 100 fL Final    MCH 06/17/2025 31.2  26.5 - 33.0 pg Final    MCHC 06/17/2025 32.3  31.5 - 36.5 g/dL Final    RDW 06/17/2025 12.3  10.0 - 15.0 % Final    Platelet Count 06/17/2025 155  150 - 450 10e3/uL Final    Total Protein Urine mg/dL 06/17/2025 29.4    mg/dL Final    The reference ranges have not been established in urine protein. The results should be integrated into the clinical context for interpretation.    Total Protein Urine mg/mg Creat 06/17/2025 0.25 (H)  0.00 - 0.20 mg/mg Cr Final    Creatinine Urine mg/dL 06/17/2025 118.4  mg/dL Final    The reference ranges have not been  established in urine creatinine. The results should be integrated into the clinical context for interpretation.    Color Urine 06/17/2025 Yellow  Colorless, Straw, Light Yellow, Yellow Final    Appearance Urine 06/17/2025 Clear  Clear Final    Glucose Urine 06/17/2025 Negative  Negative mg/dL Final    Bilirubin Urine 06/17/2025 Negative  Negative Final    Ketones Urine 06/17/2025 Negative  Negative mg/dL Final    Specific Gravity Urine 06/17/2025 1.020  1.003 - 1.035 Final    Blood Urine 06/17/2025 Trace (A)  Negative Final    pH Urine 06/17/2025 5.5  5.0 - 7.0 Final    Protein Albumin Urine 06/17/2025 30 (A)  Negative mg/dL Final    Urobilinogen Urine 06/17/2025 0.2  0.2, 1.0 E.U./dL Final    Nitrite Urine 06/17/2025 Positive (A)  Negative Final    Leukocyte Esterase Urine 06/17/2025 Moderate (A)  Negative Final    Bacteria Urine 06/17/2025 Moderate (A)  None Seen /HPF Final    RBC Urine 06/17/2025 0-2  0-2 /HPF /HPF Final    WBC Urine 06/17/2025 10-25 (A)  0-5 /HPF /HPF Final    Squamous Epithelials Urine 06/17/2025 Moderate (A)  None Seen /LPF Final            Lab results were reviewed and interpreted.     Assessment/Plan:   CKD Stage 3a: at risk for CKD in the setting of diabetes, hypertension, obesity but her rise in creatinine correlates best with her LISA event in November. Pleased to see that she had some recovery back to near her previous baseline. Will plan to monitor kidney function to assure stability. Previously without albuminuria or hematuria - UPCR now just minimal at 0.25 g/g  Hypertension: BP reasonable most recently - will monitor  DM: last A1C well controlled at 6.7% in Feb.   Obesity: encourage weight loss to help decreased work of the kidney  'Swelling: recommend compression stockings, leg elevation, sodium intake goal of 2000 mg per day.   Vitamin D deficiency/Secondary hyperparathyroidism, renal - will start ergocalciferol 50,000 units to be taken weekly X 4 weeks. After completing this  therapy, will start a maintenance dose of vitmain D with cholecalciferol 1000 units daily.     Patient Instructions   Sodium intake goal of 2000 mg per day.   Leg elevation  Compression stockings - knee high.      940-1010 AM video visit via [email or web address] [place].   57 minutes spent by me on the date of the encounter doing chart review, review of test results, interpretation of tests, patient visit, and documentation     Brittni Kilpatrick, DO

## 2025-07-01 ENCOUNTER — RESULTS FOLLOW-UP (OUTPATIENT)
Dept: NEPHROLOGY | Facility: CLINIC | Age: 85
End: 2025-07-01

## 2025-07-01 RX ORDER — ERGOCALCIFEROL 1.25 MG/1
50000 CAPSULE, LIQUID FILLED ORAL WEEKLY
Qty: 4 CAPSULE | Refills: 0 | Status: SHIPPED | OUTPATIENT
Start: 2025-07-01

## 2025-07-02 ENCOUNTER — TELEPHONE (OUTPATIENT)
Dept: FAMILY MEDICINE | Facility: OTHER | Age: 85
End: 2025-07-02
Payer: COMMERCIAL

## 2025-07-02 NOTE — TELEPHONE ENCOUNTER
Daughter calling on behalf of patient regarding increased edema, patient was told per kidney specialist that she could maybe change the Diltiazem to another medication to help decrease some of the swelling in her legs. Patient was on a trial of Lasix but does not feel this helped.     Please advise on any medication changes.    Gena Estrada RN on 7/2/2025 at 10:44 AM

## 2025-07-02 NOTE — TELEPHONE ENCOUNTER
Would need a visit to discuss medication change    Carlos Stinson PA-C  [facility] [place]

## 2025-07-09 ENCOUNTER — MEDICAL CORRESPONDENCE (OUTPATIENT)
Dept: HEALTH INFORMATION MANAGEMENT | Facility: CLINIC | Age: 85
End: 2025-07-09
Payer: COMMERCIAL

## 2025-07-11 ENCOUNTER — TELEPHONE (OUTPATIENT)
Dept: FAMILY MEDICINE | Facility: OTHER | Age: 85
End: 2025-07-11
Payer: COMMERCIAL

## 2025-07-11 NOTE — TELEPHONE ENCOUNTER
Forms/Letter Request    Type of form/letter: OTHER: discharge from Home care PT goals have been met       Do we have the form/letter: Yes:      Who is the form from? [facility]    Where did/will the form come from? form was faxed in    When is form/letter needed by: complete @ your convenience    How would you like the form/letter returned: Fax : to:  [phone]    Patient Notified form requests are processed in 5-7 business days:N/A    Okay to leave a detailed message?: Yes at Cell number on file:    Telephone Information:   Mobile [phone]

## 2025-07-14 ENCOUNTER — OFFICE VISIT (OUTPATIENT)
Dept: FAMILY MEDICINE | Facility: OTHER | Age: 85
End: 2025-07-14
Payer: COMMERCIAL

## 2025-07-14 ENCOUNTER — ANCILLARY PROCEDURE (OUTPATIENT)
Dept: GENERAL RADIOLOGY | Facility: OTHER | Age: 85
End: 2025-07-14
Attending: PHYSICIAN ASSISTANT
Payer: COMMERCIAL

## 2025-07-14 ENCOUNTER — MEDICAL CORRESPONDENCE (OUTPATIENT)
Dept: HEALTH INFORMATION MANAGEMENT | Facility: CLINIC | Age: 85
End: 2025-07-14

## 2025-07-14 VITALS
DIASTOLIC BLOOD PRESSURE: 66 MMHG | TEMPERATURE: 98.3 F | HEIGHT: 63 IN | BODY MASS INDEX: 38.89 KG/M2 | HEART RATE: 63 BPM | RESPIRATION RATE: 18 BRPM | WEIGHT: 219.5 LBS | SYSTOLIC BLOOD PRESSURE: 136 MMHG | OXYGEN SATURATION: 93 %

## 2025-07-14 DIAGNOSIS — M51.369 DEGENERATION OF INTERVERTEBRAL DISC OF LUMBAR REGION, UNSPECIFIED WHETHER PAIN PRESENT: ICD-10-CM

## 2025-07-14 DIAGNOSIS — N18.30 DIABETES MELLITUS WITH STAGE 3 CHRONIC KIDNEY DISEASE (H): Primary | ICD-10-CM

## 2025-07-14 DIAGNOSIS — E11.22 DIABETES MELLITUS WITH STAGE 3 CHRONIC KIDNEY DISEASE (H): Primary | ICD-10-CM

## 2025-07-14 DIAGNOSIS — I10 ESSENTIAL HYPERTENSION WITH GOAL BLOOD PRESSURE LESS THAN 140/90: ICD-10-CM

## 2025-07-14 PROCEDURE — 1126F AMNT PAIN NOTED NONE PRSNT: CPT | Performed by: PHYSICIAN ASSISTANT

## 2025-07-14 PROCEDURE — 99214 OFFICE O/P EST MOD 30 MIN: CPT | Performed by: PHYSICIAN ASSISTANT

## 2025-07-14 PROCEDURE — G2211 COMPLEX E/M VISIT ADD ON: HCPCS | Performed by: PHYSICIAN ASSISTANT

## 2025-07-14 PROCEDURE — 72080 X-RAY EXAM THORACOLMB 2/> VW: CPT | Mod: TC | Performed by: RADIOLOGY

## 2025-07-14 PROCEDURE — 3078F DIAST BP <80 MM HG: CPT | Performed by: PHYSICIAN ASSISTANT

## 2025-07-14 PROCEDURE — 3075F SYST BP GE 130 - 139MM HG: CPT | Performed by: PHYSICIAN ASSISTANT

## 2025-07-14 RX ORDER — HYDRALAZINE HYDROCHLORIDE 10 MG/1
10 TABLET, FILM COATED ORAL 2 TIMES DAILY
Qty: 60 TABLET | Refills: 1 | Status: SHIPPED | OUTPATIENT
Start: 2025-07-14

## 2025-07-14 ASSESSMENT — PAIN SCALES - GENERAL: PAINLEVEL_OUTOF10: NO PAIN (0)

## 2025-07-14 NOTE — PROGRESS NOTES
Assessment & Plan     ICD-10-CM    1. Diabetes mellitus with stage 3 chronic kidney disease (H)  E11.22     N18.30       2. Essential hypertension with goal blood pressure less than 140/90  I10 hydrALAZINE (APRESOLINE) 10 MG tablet      3. Degeneration of intervertebral disc of lumbar region, unspecified whether pain present  M51.369 XR Thoracic Lumbar Spine 2 Views        1-2  Patient is a T2DM not on insulin with stage 3 CKD with elevated blood pressures and lower extremity edema. Currently being seen by nephrology and was recommended to change diltiazem due to the swelling. Kidney function labs checked and improving. Patient insistent her diltiazem is causing her LE edema that started within the last 3 month. Patient has been on diltiazem for many years so I do not suspect this is the cause edema. Elevated bp's noticed this past week. Does not log them accurately but averaged 160s/high 80's. Never over 170. No headaches, visual changes with elevated pressures. Has tolerated hydralazine in the past.   - Plan to start hydralazine 10 mg BID and log bp's   - Compression socks and low salt diet  - Follow up in 2 weeks to recheck bp's   - Reviewed medication use and side effects including monitoring for hypotension    3  Continues to struggle with controlling lumbar pain since surgery and due to limited options because of her CKD and extreme nausea to opioid medication.   - Lumbar XRAY today to check hardware, await results   - Can try splitting oxycodone in half to see if this still makes her nauseous   - Reviewed medication use and side effects     The patient indicates understanding of these issues and agrees with the plan.    Follow up: 1 week for bp check     ICarlos PA-C,  was present with the PA student who participated in the service and in the documentation of the note.  I have verified the history and personally performed the physical exam and medical decision making.  I agree with the  "assessment and plan of care as documented in the note.     BRANDEN BellS   [facility]     Carlos Stinson PA-C  [facility]         Wan Roque is a 85 year old, presenting for the following health issues:  Hypertension        7/14/2025     3:39 PM   Additional Questions   Roomed by Kiya   Accompanied by Daughter: April 7/14/2025     3:39 PM   Patient Reported Additional Medications   Patient reports taking the following new medications Vitamin D, Vitamin d3     History of Present Illness       Hypertension: She presents for follow up of hypertension.  She does check blood pressure  regularly outside of the clinic. Outside blood pressures have been over 140/90. She follows a low salt diet.     She eats 2-3 servings of fruits and vegetables daily.She consumes 0 sweetened beverage(s) daily.She exercises with enough effort to increase her heart rate 30 to 60 minutes per day.  She exercises with enough effort to increase her heart rate 3 or less days per week.   She is taking medications regularly.        Elevated Bp's this past week. Last reading 160/98 with no readings over >170 and asymptomatic. Is complaining about chronic LE edema that has not \"gone away\" since surgery. Has tried short lasix trial due to declining kidney function, no improvement. Already wearing compression socks and following low salt diet.     Has tried multiple BP meds in the past including hydralazine, and cannot remember if she was unable to tolerate it.     No chest pain or shortness of breath.       Review of Systems  Constitutional, HEENT, cardiovascular, pulmonary, gi and gu systems are negative, except as otherwise noted.      Objective    /66   Pulse 63   Temp 98.3  F (36.8  C) (Temporal)   Resp 18   Ht 1.6 m (5' 2.99\")   Wt 99.6 kg (219 lb 8 oz)   LMP  (LMP Unknown)   SpO2 93%   BMI 38.89 kg/m    Body mass index is 38.89 kg/m .  Physical Exam   GENERAL " APPEARANCE: healthy, alert and no distress  EYES: Eyes grossly normal to inspection, PERRLA, conjunctivae and sclerae without injection or discharge, EOM intact   RESP: Lungs clear to auscultation - no rales, rhonchi or wheezes    CV: Regular rates and rhythm, normal S1 S2, no S3 or S4, no murmur, click or rub, no peripheral edema and peripheral pulses strong and symmetric bilaterally   MS: No musculoskeletal defects are noted and gait is age appropriate without ataxia   SKIN: No suspicious lesions or rashes, hydration status appears adeuqate with normal skin turgor   PSYCH: Alert and oriented x3; speech- coherent , normal rate and volume; able to articulate logical thoughts, able to abstract reason, no tangential thoughts, no hallucinations or delusions, mentation appears normal, Mood is euthymic. Affect is appropriate for this mood state and bright. Thought content is free of suicidal ideation, hallucinations, and delusions. Dress is adequate and upkept. Eye contact is good during conversation.       Diagnostics: reviewed in Epic, see orders pending in Epic           Signed Electronically by: Kisha Stinson PA-C

## 2025-07-31 ENCOUNTER — VIRTUAL VISIT (OUTPATIENT)
Dept: FAMILY MEDICINE | Facility: OTHER | Age: 85
End: 2025-07-31
Payer: COMMERCIAL

## 2025-07-31 DIAGNOSIS — I10 ESSENTIAL HYPERTENSION WITH GOAL BLOOD PRESSURE LESS THAN 140/90: Primary | ICD-10-CM

## 2025-07-31 DIAGNOSIS — N18.32 STAGE 3B CHRONIC KIDNEY DISEASE (H): ICD-10-CM

## 2025-07-31 RX ORDER — HYDRALAZINE HYDROCHLORIDE 25 MG/1
25 TABLET, FILM COATED ORAL 2 TIMES DAILY
Qty: 60 TABLET | Refills: 0 | Status: SHIPPED | OUTPATIENT
Start: 2025-07-31

## 2025-07-31 NOTE — PROGRESS NOTES
Mya is a 85 year old who is being evaluated via a billable telephone visit.    What phone number would you like to be contacted at? [phone]  How would you like to obtain your AVS? Mail a copy  Originating Location (pt. Location): Home  Distant Location (provider location):  On-site  Telephone visit completed due to the patient did not have access to video, while the distant provider did.    Assessment & Plan     ICD-10-CM    1. Essential hypertension with goal blood pressure less than 140/90  I10 hydrALAZINE (APRESOLINE) 25 MG tablet      2. Stage 3b chronic kidney disease (H)  N18.32           - Patient here for recheck of edema and HTN     Edema has been ongoing issue, initially told maybe Diltiazem contributing      Has been on this for years, so find this less likely to be cause, likely age and inactivity       Recommend BP control before discontinuing Diltazem, patient was put on this by previous primary, no cardiologist is part of care team   - Last visit started Hydralazine, reports BP improving but mostly still above goal and no help with edema   - Recommend Hydralazine increase to 20 mg BID until gone, then new RX will be 25 mg BID, continue Diltiazem      As previous, due to CKD and failed medications, limitations as to what else we could prescribe to get BP control, discussed goal at her age <150/90   - Will continue with close follow up in 2 weeks       The patient indicates understanding of these issues and agrees with the plan.    Follow up: 2 weeks     Carlos Torres-SAILAJA Orr  [facility]   Mya is a 85 year old, presenting for the following health issues:  Hypertension        7/31/2025     1:13 PM   Additional Questions   Roomed by Lara   Accompanied by Daughter April     History of Present Illness            Hypertension Follow-up    Do you check your blood pressure regularly outside of the clinic? Yes   Are you following a low salt diet?  Yes  Are your blood pressures ever more than 140 on the top number (systolic) OR more   than 90 on the bottom number (diastolic), for example 140/90? Yes      - Fine except for legs   - Pain in back improving   - No change in legs   - 150's-170's/80-90's      145/74 this afternoon       165/92 - this morning         BP Readings from Last 2 Encounters:   07/14/25 136/66   04/29/25 (!) 160/80           Review of Systems  Constitutional, neuro, ENT, endocrine, pulmonary, cardiac, gastrointestinal, genitourinary, musculoskeletal, integument and psychiatric systems are negative, except as otherwise noted.      Objective    Vitals - Patient Reported  Systolic (Patient Reported): (!) 165 (about 9a)  Diastolic (Patient Reported): (!) 92 (about 9a)      Vitals:  No vitals were obtained today due to virtual visit.    Physical Exam   General: Alert and no distress //Respiratory: No audible wheeze, cough, or shortness of breath // Psychiatric:  Appropriate affect, tone, and pace of words      Diagnostics: none       Phone call duration: 8 minutes  Signed Electronically by: Kisha Stinson PA-C

## 2025-08-20 ENCOUNTER — VIRTUAL VISIT (OUTPATIENT)
Dept: FAMILY MEDICINE | Facility: OTHER | Age: 85
End: 2025-08-20
Payer: COMMERCIAL

## 2025-08-20 DIAGNOSIS — N18.32 STAGE 3B CHRONIC KIDNEY DISEASE (H): ICD-10-CM

## 2025-08-20 DIAGNOSIS — I10 ESSENTIAL HYPERTENSION WITH GOAL BLOOD PRESSURE LESS THAN 140/90: Primary | ICD-10-CM

## 2025-08-20 PROCEDURE — 98014 SYNCH AUDIO-ONLY EST MOD 30: CPT | Performed by: PHYSICIAN ASSISTANT

## 2025-08-20 PROCEDURE — 1126F AMNT PAIN NOTED NONE PRSNT: CPT | Mod: 93 | Performed by: PHYSICIAN ASSISTANT

## 2025-08-20 RX ORDER — HYDRALAZINE HYDROCHLORIDE 50 MG/1
50 TABLET, FILM COATED ORAL 2 TIMES DAILY
Qty: 60 TABLET | Refills: 3 | Status: SHIPPED | OUTPATIENT
Start: 2025-08-20

## 2025-08-27 DIAGNOSIS — M51.362 DEGENERATION OF INTERVERTEBRAL DISC OF LUMBAR REGION WITH DISCOGENIC BACK PAIN AND LOWER EXTREMITY PAIN: ICD-10-CM

## 2025-08-27 DIAGNOSIS — Z98.1 S/P SPINAL FUSION: ICD-10-CM

## 2025-08-27 RX ORDER — OXYCODONE HYDROCHLORIDE 5 MG/1
5 TABLET ORAL EVERY 6 HOURS PRN
Qty: 90 TABLET | Refills: 0 | Status: SHIPPED | OUTPATIENT
Start: 2025-08-27

## (undated) DEVICE — SOL NACL 0.9% INJ 1000ML BAG 07983-09

## (undated) DEVICE — ESU ELEC BLADE 2.75" COATED/INSULATED E1455

## (undated) DEVICE — PREP CHLORAPREP 26ML TINTED ORANGE  260815

## (undated) DEVICE — MANIFOLD NEPTUNE 4 PORT 700-20

## (undated) DEVICE — SU DERMABOND PROPEN .5ML DPP6

## (undated) DEVICE — TOOL DISSECT MIDAS MR8 14CM TELESC MATCH 2.5 MR8-T14MH25

## (undated) DEVICE — ENDO TUBING CO2 SMARTCAP STERILE DISP 100145CO2EXT

## (undated) DEVICE — SYR 05ML LL W/O NDL

## (undated) DEVICE — TUBING IV EXTENSION SET 34"

## (undated) DEVICE — DRAPE MICROSCOPE LEICA 54X150" AR8033650

## (undated) DEVICE — GLOVE BIOGEL PI ULTRATOUCH G SZ 7.5 42175

## (undated) DEVICE — CATH TRAY FOLEY COUDE SURESTEP 16FR W/URNE MTR STLK A304716A

## (undated) DEVICE — ENDO CAP AND TUBING STERILE FOR ENDOGATOR  100130

## (undated) DEVICE — GLOVE PROTEXIS W/NEU-THERA 7.5  2D73TE75

## (undated) DEVICE — SU VICRYL 0 CT-1 CR 8X18" J740D

## (undated) DEVICE — GLOVE BIOGEL PI MICRO INDICATOR UNDERGLOVE SZ 8.0 48980

## (undated) DEVICE — NDL INSUFFLATION 120MM VERRES 172015

## (undated) DEVICE — SYR 10ML LL W/O NDL

## (undated) DEVICE — BLADE BONE MILL STRK 3.2MM FINE 5400-702-000

## (undated) DEVICE — SUCTION MANIFOLD DORNOCH ULTRA CART UL-CL500

## (undated) DEVICE — NDL ECLIPSE 18GA 1.5"

## (undated) DEVICE — ESU SUCTION/IRRIGATION SYSTEM PISTOL GRIP

## (undated) DEVICE — GLOVE PROTEXIS BLUE W/NEU-THERA 8.0  2D73EB80

## (undated) DEVICE — NDL COUNTER 10CT

## (undated) DEVICE — SPONGE SURGIFOAM 100 1974

## (undated) DEVICE — ENDO TROCAR OPTICAL 11MM VERSAPORT PLUS W/FIX CAN ONB11STF

## (undated) DEVICE — PAD CHUX UNDERPAD 23X24" 7136

## (undated) DEVICE — TRAY PROCEDURE SUPPORT PAIN MANAGEMENT 332114

## (undated) DEVICE — KIT PATIENT JACKSON 1 SPK10118

## (undated) DEVICE — SPONGE COTTON BALL 1" W/STRING 30-034

## (undated) DEVICE — TAPE CLOTH 3" CARDINAL 3TRCL03

## (undated) DEVICE — NDL SPINAL 18GA 3.5" 405184

## (undated) DEVICE — ESU GROUND PAD UNIVERSAL W/O CORD

## (undated) DEVICE — ESU HOOK TIP 5MM CONMED

## (undated) DEVICE — NDL SPINAL 25GA 4.69" QUINCKE 405234

## (undated) DEVICE — Device

## (undated) DEVICE — PACK GENERAL LAPAOSCOPY

## (undated) DEVICE — CLIP APPLIER ENDO 05MM MED/LG 176630

## (undated) DEVICE — TRAY EPDRL 3.5IN 17GA 19GA PRFX BPA DEHP 332079

## (undated) DEVICE — DRAPE COVER C-ARM SEAMLESS SNAP-KAP 03-KP26 LATEX FREE

## (undated) DEVICE — TUBING SUCTION SOFT 20'X3/16" 0036570

## (undated) DEVICE — SOL WATER IRRIG 1000ML BOTTLE 2F7114

## (undated) DEVICE — MARKER SPHERES PASSIVE MEDT PACK 5 8801075

## (undated) DEVICE — DRAIN JACKSON PRATT RESERVOIR 100ML SU130-1305

## (undated) DEVICE — SOL NACL 0.9% INJ 250ML BAG 2B1322Q

## (undated) DEVICE — ENDO TROCAR BLADELESS 05X100MM B5LT

## (undated) DEVICE — NDL SPINAL 22GA 3.5" QUINCKE 405181

## (undated) DEVICE — SPONGE LAP 18X18" X8435

## (undated) DEVICE — PREP DURAPREP 26ML APL 8630

## (undated) DEVICE — ENDO SHEARS 5MM 176643

## (undated) DEVICE — RX SURGIFLO HEMOSTATIC MATRIX W/THROMBIN 8ML 2994

## (undated) DEVICE — DRAPE SHEET REV FOLD 3/4 9349

## (undated) DEVICE — KIT MAZOR X ROBOTIC SPINE DISP KIT0574

## (undated) DEVICE — ESU BIPOLAR SEALER AQUAMANTYS 6MM 23-112-1

## (undated) DEVICE — PACK ENDOSCOPY GI CUSTOM UMMC

## (undated) DEVICE — TOOL DISSECT MIDAS MR8 14CM MATCH HEAD 3MM MR8-14MH30

## (undated) DEVICE — ENDO CLIP CARTIRDGE K2 MED/LG 10 1112

## (undated) DEVICE — DRAIN JACKSON PRATT 07MM FLAT SU130-1310

## (undated) DEVICE — SPONGE RAY-TEC 4X8" 7318

## (undated) DEVICE — WIPE PREMOIST CLEANSING WASHCLOTHS 7988

## (undated) DEVICE — KIT ENDO FIRST STEP DISINFECTANT 200ML W/POUCH EP-4

## (undated) DEVICE — NEEDLE SPINAL DISP 22GA X 3.5" QUINCKE 333320

## (undated) DEVICE — PACK LARGE SPINE SNE15LSFSE

## (undated) DEVICE — POSITIONER PT PRONESAFE HEAD REST W/DERMAPROX INSERT 40599

## (undated) DEVICE — KIT CONNECTOR FOR OLYMPUS ENDOSCOPES DEFENDO 100310

## (undated) DEVICE — LINEN TOWEL PACK X5 5464

## (undated) DEVICE — DRAPE IOBAN INCISE 23X17" 6650EZ

## (undated) DEVICE — SYR 03ML LL W/O NDL

## (undated) DEVICE — SPONGE COTTONOID 1/2X3" 80-1407

## (undated) DEVICE — SU VICRYL 2-0 CT-2 CR 8X18" J726D

## (undated) DEVICE — DRAPE MAYO STAND 23X54 8337

## (undated) DEVICE — PACK UNIVERSAL SPLIT 29131

## (undated) DEVICE — DRILL TWIST MIDAS REX MEDTRONIC 30X3MM STERILE MR8-31TD3030

## (undated) DEVICE — GLOVE BIOGEL PI MICRO SZ 7.5 48575

## (undated) RX ORDER — ONDANSETRON 2 MG/ML
INJECTION INTRAMUSCULAR; INTRAVENOUS
Status: DISPENSED
Start: 2019-01-31

## (undated) RX ORDER — FENTANYL CITRATE 50 UG/ML
INJECTION, SOLUTION INTRAMUSCULAR; INTRAVENOUS
Status: DISPENSED
Start: 2023-10-25

## (undated) RX ORDER — PROPOFOL 10 MG/ML
INJECTION, EMULSION INTRAVENOUS
Status: DISPENSED
Start: 2023-10-25

## (undated) RX ORDER — CEFAZOLIN SODIUM/WATER 2 G/20 ML
SYRINGE (ML) INTRAVENOUS
Status: DISPENSED
Start: 2023-10-25

## (undated) RX ORDER — HEPARIN SODIUM 1000 [USP'U]/ML
INJECTION, SOLUTION INTRAVENOUS; SUBCUTANEOUS
Status: DISPENSED
Start: 2023-10-30

## (undated) RX ORDER — LIDOCAINE HYDROCHLORIDE 10 MG/ML
INJECTION, SOLUTION INFILTRATION; PERINEURAL
Status: DISPENSED
Start: 2023-10-30

## (undated) RX ORDER — ACETAMINOPHEN 325 MG/1
TABLET ORAL
Status: DISPENSED
Start: 2019-01-31

## (undated) RX ORDER — LIDOCAINE HYDROCHLORIDE 20 MG/ML
JELLY TOPICAL
Status: DISPENSED
Start: 2023-11-09

## (undated) RX ORDER — GABAPENTIN 100 MG/1
CAPSULE ORAL
Status: DISPENSED
Start: 2023-10-25

## (undated) RX ORDER — DIPHENHYDRAMINE HYDROCHLORIDE 50 MG/ML
INJECTION INTRAMUSCULAR; INTRAVENOUS
Status: DISPENSED
Start: 2023-10-25

## (undated) RX ORDER — EPHEDRINE SULFATE 50 MG/ML
INJECTION, SOLUTION INTRAMUSCULAR; INTRAVENOUS; SUBCUTANEOUS
Status: DISPENSED
Start: 2023-10-25

## (undated) RX ORDER — BUPIVACAINE HYDROCHLORIDE AND EPINEPHRINE 5; 5 MG/ML; UG/ML
INJECTION, SOLUTION EPIDURAL; INTRACAUDAL; PERINEURAL
Status: DISPENSED
Start: 2023-10-25

## (undated) RX ORDER — DEXAMETHASONE SODIUM PHOSPHATE 4 MG/ML
INJECTION, SOLUTION INTRA-ARTICULAR; INTRALESIONAL; INTRAMUSCULAR; INTRAVENOUS; SOFT TISSUE
Status: DISPENSED
Start: 2023-10-25

## (undated) RX ORDER — SIMETHICONE 40MG/0.6ML
SUSPENSION, DROPS(FINAL DOSAGE FORM)(ML) ORAL
Status: DISPENSED
Start: 2019-01-31

## (undated) RX ORDER — LIDOCAINE HYDROCHLORIDE 20 MG/ML
JELLY TOPICAL
Status: DISPENSED
Start: 2023-11-08

## (undated) RX ORDER — REMIFENTANIL HYDROCHLORIDE 1 MG/ML
INJECTION, POWDER, LYOPHILIZED, FOR SOLUTION INTRAVENOUS
Status: DISPENSED
Start: 2023-10-25

## (undated) RX ORDER — APREPITANT 40 MG/1
CAPSULE ORAL
Status: DISPENSED
Start: 2023-10-25

## (undated) RX ORDER — SCOLOPAMINE TRANSDERMAL SYSTEM 1 MG/1
PATCH, EXTENDED RELEASE TRANSDERMAL
Status: DISPENSED
Start: 2019-01-31

## (undated) RX ORDER — ONDANSETRON 2 MG/ML
INJECTION INTRAMUSCULAR; INTRAVENOUS
Status: DISPENSED
Start: 2023-10-25

## (undated) RX ORDER — FENTANYL CITRATE 50 UG/ML
INJECTION, SOLUTION INTRAMUSCULAR; INTRAVENOUS
Status: DISPENSED
Start: 2019-01-31

## (undated) RX ORDER — GLYCOPYRROLATE 0.2 MG/ML
INJECTION, SOLUTION INTRAMUSCULAR; INTRAVENOUS
Status: DISPENSED
Start: 2023-10-25

## (undated) RX ORDER — DEXMEDETOMIDINE HYDROCHLORIDE 4 UG/ML
INJECTION, SOLUTION INTRAVENOUS
Status: DISPENSED
Start: 2023-10-25

## (undated) RX ORDER — LIDOCAINE HYDROCHLORIDE 10 MG/ML
INJECTION, SOLUTION INFILTRATION; PERINEURAL
Status: DISPENSED
Start: 2023-11-24

## (undated) RX ORDER — EPINEPHRINE 0.1 MG/ML
INJECTION INTRAVENOUS
Status: DISPENSED
Start: 2023-10-25